# Patient Record
Sex: FEMALE | Race: BLACK OR AFRICAN AMERICAN | NOT HISPANIC OR LATINO | Employment: OTHER | URBAN - METROPOLITAN AREA
[De-identification: names, ages, dates, MRNs, and addresses within clinical notes are randomized per-mention and may not be internally consistent; named-entity substitution may affect disease eponyms.]

---

## 2017-02-24 ENCOUNTER — OFFICE VISIT (OUTPATIENT)
Dept: FAMILY MEDICINE | Facility: CLINIC | Age: 72
End: 2017-02-24
Payer: MEDICARE

## 2017-02-24 VITALS
HEIGHT: 61 IN | BODY MASS INDEX: 31.13 KG/M2 | WEIGHT: 164.88 LBS | TEMPERATURE: 98 F | RESPIRATION RATE: 12 BRPM | OXYGEN SATURATION: 99 % | SYSTOLIC BLOOD PRESSURE: 152 MMHG | DIASTOLIC BLOOD PRESSURE: 96 MMHG | HEART RATE: 65 BPM

## 2017-02-24 DIAGNOSIS — I10 UNCONTROLLED HYPERTENSION: Primary | ICD-10-CM

## 2017-02-24 PROCEDURE — 1157F ADVNC CARE PLAN IN RCRD: CPT | Mod: S$GLB,,, | Performed by: FAMILY MEDICINE

## 2017-02-24 PROCEDURE — 99214 OFFICE O/P EST MOD 30 MIN: CPT | Mod: S$GLB,,, | Performed by: FAMILY MEDICINE

## 2017-02-24 PROCEDURE — 99499 UNLISTED E&M SERVICE: CPT | Mod: S$GLB,,, | Performed by: FAMILY MEDICINE

## 2017-02-24 PROCEDURE — 1160F RVW MEDS BY RX/DR IN RCRD: CPT | Mod: S$GLB,,, | Performed by: FAMILY MEDICINE

## 2017-02-24 PROCEDURE — 3077F SYST BP >= 140 MM HG: CPT | Mod: S$GLB,,, | Performed by: FAMILY MEDICINE

## 2017-02-24 PROCEDURE — 3080F DIAST BP >= 90 MM HG: CPT | Mod: S$GLB,,, | Performed by: FAMILY MEDICINE

## 2017-02-24 PROCEDURE — 99999 PR PBB SHADOW E&M-EST. PATIENT-LVL III: CPT | Mod: PBBFAC,,, | Performed by: FAMILY MEDICINE

## 2017-02-24 PROCEDURE — 1159F MED LIST DOCD IN RCRD: CPT | Mod: S$GLB,,, | Performed by: FAMILY MEDICINE

## 2017-02-24 RX ORDER — METOPROLOL SUCCINATE 50 MG/1
50 TABLET, EXTENDED RELEASE ORAL DAILY
Qty: 90 TABLET | Refills: 0 | Status: SHIPPED | OUTPATIENT
Start: 2017-02-24 | End: 2017-05-05 | Stop reason: SDUPTHER

## 2017-02-24 RX ORDER — METOPROLOL SUCCINATE 100 MG/1
100 TABLET, EXTENDED RELEASE ORAL DAILY
Qty: 90 TABLET | Refills: 0 | Status: SHIPPED | OUTPATIENT
Start: 2017-02-24 | End: 2017-02-24 | Stop reason: SDUPTHER

## 2017-02-24 RX ORDER — AMLODIPINE BESYLATE 10 MG/1
10 TABLET ORAL DAILY
Qty: 90 TABLET | Refills: 0 | Status: SHIPPED | OUTPATIENT
Start: 2017-02-24 | End: 2017-05-05 | Stop reason: SDUPTHER

## 2017-02-24 NOTE — PROGRESS NOTES
Subjective:       Patient ID: Kristin Goodman is a 71 y.o. female.    Chief Complaint: Hypertension    HPI:  Presents with uncontrolled hypertension since 12/16.  BP range 170-180's/.  A lot of stress related to work.  Denies symptoms.  Currently on Metoprolol 50 mg daily.  Took Chronic arthritis pain.  Review of Systems   Eyes: Negative for visual disturbance.   Respiratory: Negative for shortness of breath.    Cardiovascular: Negative for chest pain, palpitations and leg swelling.   Neurological: Positive for headaches (mild HA today). Negative for dizziness.       Objective:      Physical Exam   Constitutional: She appears well-developed and well-nourished.   HENT:   Head: Normocephalic.   Cardiovascular: Normal rate and regular rhythm.    Pulmonary/Chest: Effort normal. No respiratory distress.   Musculoskeletal: She exhibits no edema.         Assessment:       1. Uncontrolled hypertension        Plan:       Uncontrolled hypertension  -     Resume amlodipine (NORVASC) 10 MG tablet; Take 1 tablet (10 mg total) by mouth once daily.  Dispense: 90 tablet; Refill: 0  -     metoprolol succinate (TOPROL-XL) 50 MG 24 hr tablet; Take 1 tablet (50 mg total) by mouth once daily.  Dispense: 90 tablet; Refill: 0      Return in about 2 weeks (around 3/10/2017) for BP.

## 2017-03-02 ENCOUNTER — TELEPHONE (OUTPATIENT)
Dept: FAMILY MEDICINE | Facility: CLINIC | Age: 72
End: 2017-03-02

## 2017-03-02 ENCOUNTER — HOSPITAL ENCOUNTER (EMERGENCY)
Facility: HOSPITAL | Age: 72
Discharge: HOME | End: 2017-03-02
Attending: EMERGENCY MEDICINE
Payer: MEDICARE

## 2017-03-02 VITALS
BODY MASS INDEX: 31.15 KG/M2 | HEART RATE: 78 BPM | SYSTOLIC BLOOD PRESSURE: 168 MMHG | RESPIRATION RATE: 15 BRPM | DIASTOLIC BLOOD PRESSURE: 74 MMHG | WEIGHT: 165 LBS | OXYGEN SATURATION: 100 % | HEIGHT: 61 IN | TEMPERATURE: 98 F

## 2017-03-02 DIAGNOSIS — I10 ESSENTIAL HYPERTENSION: Primary | ICD-10-CM

## 2017-03-02 DIAGNOSIS — R51.9 ACUTE NONINTRACTABLE HEADACHE, UNSPECIFIED HEADACHE TYPE: ICD-10-CM

## 2017-03-02 LAB
ANION GAP SERPL CALC-SCNC: 9 MMOL/L
BASOPHILS # BLD AUTO: 0.02 K/UL
BASOPHILS NFR BLD: 0.2 %
BUN SERPL-MCNC: 13 MG/DL
CALCIUM SERPL-MCNC: 10.3 MG/DL
CHLORIDE SERPL-SCNC: 104 MMOL/L
CO2 SERPL-SCNC: 29 MMOL/L
CREAT SERPL-MCNC: 0.9 MG/DL
DIFFERENTIAL METHOD: ABNORMAL
EOSINOPHIL # BLD AUTO: 0.1 K/UL
EOSINOPHIL NFR BLD: 1.4 %
ERYTHROCYTE [DISTWIDTH] IN BLOOD BY AUTOMATED COUNT: 13.7 %
EST. GFR  (AFRICAN AMERICAN): >60 ML/MIN/1.73 M^2
EST. GFR  (NON AFRICAN AMERICAN): >60 ML/MIN/1.73 M^2
GLUCOSE SERPL-MCNC: 83 MG/DL
HCT VFR BLD AUTO: 41.9 %
HGB BLD-MCNC: 13.9 G/DL
LYMPHOCYTES # BLD AUTO: 2.6 K/UL
LYMPHOCYTES NFR BLD: 31 %
MCH RBC QN AUTO: 28.8 PG
MCHC RBC AUTO-ENTMCNC: 33.2 %
MCV RBC AUTO: 87 FL
MONOCYTES # BLD AUTO: 1.1 K/UL
MONOCYTES NFR BLD: 13.2 %
NEUTROPHILS # BLD AUTO: 4.6 K/UL
NEUTROPHILS NFR BLD: 54.1 %
PLATELET # BLD AUTO: 324 K/UL
PMV BLD AUTO: 10.7 FL
POTASSIUM SERPL-SCNC: 4.1 MMOL/L
RBC # BLD AUTO: 4.82 M/UL
SODIUM SERPL-SCNC: 142 MMOL/L
TROPONIN I SERPL DL<=0.01 NG/ML-MCNC: <0.006 NG/ML
WBC # BLD AUTO: 8.46 K/UL

## 2017-03-02 PROCEDURE — 84484 ASSAY OF TROPONIN QUANT: CPT

## 2017-03-02 PROCEDURE — 63600175 PHARM REV CODE 636 W HCPCS: Performed by: EMERGENCY MEDICINE

## 2017-03-02 PROCEDURE — 85025 COMPLETE CBC W/AUTO DIFF WBC: CPT

## 2017-03-02 PROCEDURE — 25000003 PHARM REV CODE 250: Performed by: EMERGENCY MEDICINE

## 2017-03-02 PROCEDURE — 96361 HYDRATE IV INFUSION ADD-ON: CPT

## 2017-03-02 PROCEDURE — 99284 EMERGENCY DEPT VISIT MOD MDM: CPT | Mod: 25

## 2017-03-02 PROCEDURE — 80048 BASIC METABOLIC PNL TOTAL CA: CPT

## 2017-03-02 PROCEDURE — 93005 ELECTROCARDIOGRAM TRACING: CPT

## 2017-03-02 PROCEDURE — 96374 THER/PROPH/DIAG INJ IV PUSH: CPT

## 2017-03-02 RX ORDER — HYDRALAZINE HYDROCHLORIDE 20 MG/ML
20 INJECTION INTRAMUSCULAR; INTRAVENOUS
Status: COMPLETED | OUTPATIENT
Start: 2017-03-02 | End: 2017-03-02

## 2017-03-02 RX ORDER — SODIUM CHLORIDE 9 MG/ML
500 INJECTION, SOLUTION INTRAVENOUS
Status: COMPLETED | OUTPATIENT
Start: 2017-03-02 | End: 2017-03-02

## 2017-03-02 RX ADMIN — HYDRALAZINE HYDROCHLORIDE 20 MG: 20 INJECTION INTRAMUSCULAR; INTRAVENOUS at 05:03

## 2017-03-02 RX ADMIN — SODIUM CHLORIDE 500 ML: 0.9 INJECTION, SOLUTION INTRAVENOUS at 05:03
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
## 2017-03-02 NOTE — TELEPHONE ENCOUNTER
----- Message from Inocencia Spaulding sent at 3/2/2017  1:10 PM CST -----  Contact: self  Pt blood pressure is high. Please call [phone]. Thanks

## 2017-03-02 NOTE — ED AVS SNAPSHOT
[facility]  [address] [place] [address]               Kristin Jolleyuben   3/2/2017  3:31 PM   ED    Description:  Female : 1945   Department:  [facility]           Your Care was Coordinated By:     Provider Role From To    Ej Simpson III, MD Attending Provider 17 [id number] --      Reason for Visit     Hypertension     Headache           Diagnoses this Visit        Comments    Essential hypertension    -  Primary     Acute nonintractable headache, unspecified headache type           ED Disposition     ED Disposition Condition Comment    Discharge             To Do List           Follow-up Information     Follow up with Ilene Kan MD In 1 week.    Specialty:  Family Medicine    Contact information:    [address]  [place] [id number] [phone]        [facility]     [facility] -  Assistance  Registered nurses in the [facility] provide clinical advisement, health education, appointment booking, and other advisory services.  Call for this free service at [phone].             Medications           Message regarding Medications     Verify the changes and/or additions to your medication regime listed below are the same as discussed with your clinician today.  If any of these changes or additions are incorrect, please notify your healthcare provider.        These medications were administered today        Dose Freq    hydrALAZINE injection 20 mg 20 mg ED 1 Time    Sig: Inject 1 mL (20 mg total) into the vein ED 1 Time.    Class: Normal    Route: Intravenous    0.9%  NaCl infusion 500 mL ED 1 Time    Sig: Inject 500 mLs into the vein ED 1 Time.    Class: Normal    Route: Intravenous           Verify that the below list of medications is an accurate representation of the medications you are currently taking.  If none reported, the list may be blank. If incorrect, please contact your  "healthcare provider. Carry this list with you in case of emergency.           Current Medications     amlodipine (NORVASC) 10 MG tablet Take 1 tablet (10 mg total) by mouth once daily.    aspirin 81 MG chewable tablet Take 81 mg by mouth once daily.      epinastine 0.05 % ophthalmic solution     levothyroxine (SYNTHROID) 25 MCG tablet Take 1 tablet (25 mcg total) by mouth once daily.    metoprolol succinate (TOPROL-XL) 50 MG 24 hr tablet Take 1 tablet (50 mg total) by mouth once daily.    ACETAMINOPHEN (TYLENOL ARTHRITIS PAIN ORAL) Take by mouth.    TRIPHROCAPS 1 mg Cap TAKE ONE CAPSULE BY MOUTH ONCE DAILY           Clinical Reference Information           Your Vitals Were     BP Pulse Temp Resp Height Weight    178/75 80 98.2 °F (36.8 °C) (Oral) 15 5' 1" (1.549 m) 74.8 kg (165 lb)    SpO2 BMI             100% 31.18 kg/m2         Allergies as of 3/2/2017        Reactions    Penicillins     Other reaction(s): Hives    Sulfa (Sulfonamide Antibiotics) Rash      Immunizations Administered on Date of Encounter - 3/2/2017     None      ED Micro, Lab, POCT     Start Ordered       Status Ordering Provider    03/02/17 1631 03/02/17 1630  Troponin I  STAT      Final result     03/02/17 1551 03/02/17 1550  CBC auto differential  STAT      Final result     03/02/17 1551 03/02/17 1550  Basic metabolic panel  STAT      Final result       ED Imaging Orders     Start Ordered       Status Ordering Provider    03/02/17 1551 03/02/17 1550  CT Head Without Contrast  1 time imaging      Final result         Discharge Instructions       Return to the emergency department if you develop worsening headache, sudden changes in your vision, fainting, chest pain, difficulty breathing, or for any other medical concerns.         Controlling High Blood Pressure  High blood pressure (hypertension) is often called the silent killer. This is because many people who have it dont know it. High blood pressure is defined as 140/90 mm Hg or higher. Know " your blood pressure and remember to check it regularly. Doing so can save your life. Here are some things you can do to help control your blood pressure.    Choose heart-healthy foods  · Select low-salt, low-fat foods. Limit sodium intake to 2,400 mg per day or the amount suggested by your healthcare provider.  · Limit canned, dried, cured, packaged, and fast foods. These can contain a lot of salt.  · Eat 8 to 10 servings of fruits and vegetables every day.  · Choose lean meats, fish, or chicken.  · Eat whole-grain pasta, brown rice, and beans.  · Eat 2 to 3 servings of low-fat or fat-free dairy products.  · Ask your doctor about the DASH eating plan. This plan helps reduce blood pressure.  · When you go to a restaurant, ask that your meal be prepared with no added salt.  Maintain a healthy weight  · Ask your healthcare provider how many calories to eat a day. Then stick to that number.  · Ask your healthcare provider what weight range is healthiest for you. If you are overweight, a weight loss of only 3% to 5% of your body weight can help lower blood pressure. Generally, a good weight loss goal is to lose 10% of your body weight in a year.  · Limit snacks and sweets.  · Get regular exercise.  Get up and get active  · Choose activities you enjoy. Find ones you can do with friends or family. This includes bicycling, dancing, walking, and jogging.  · Park farther away from building entrances.  · Use stairs instead of the elevator.  · When you can, walk or bike instead of driving.  · Kimberly leaves, garden, or do household repairs.  · Be active at a moderate to vigorous level of physical activity for at least 40 minutes for a minimum of 3 to 4 days a week.   Manage stress  · Make time to relax and enjoy life. Find time to laugh.  · Communicate your concerns with your loved ones and your healthcare provider.  · Visit with family and friends, and keep up with hobbies.  Limit alcohol and quit smoking  · Men should have no  more than 2 drinks per day.  · Women should have no more than 1 drink per day.  · Talk with your healthcare provider about quitting smoking. Smoking significantly increases your risk for heart disease and stroke. Ask your healthcare provider about community smoking cessation programs and other options.  Medicines  If lifestyle changes arent enough, your healthcare provider may prescribe high blood pressure medicine. Take all medicines as prescribed. If you have any questions about your medicines, ask your healthcare provider before stopping or changing them.   Date Last Reviewed: 4/27/2016 © 2000-2016 [employer]. [address] [place] [address]. All rights reserved. This information is not intended as a substitute for professional medical care. Always follow your healthcare professional's instructions.          Your Scheduled Appointments     Mar 10, 2017  7:20 AM CST   Established Patient Visit with Ilene Kan MD   [facility] ([place]) [address] [place] [address] [phone]            Jun 06, 2017  2:20 PM CDT   Established Patient Visit with Adam Merchant MD   [facility] ([facility] )    [address]  [place] [address] [phone]              Smoking Cessation     If you would like to quit smoking:   You may be eligible for free services if you are a [place] resident and started smoking cigarettes before September 1, 1988.  Call the Smoking Cessation Trust (SCT) toll free at (300) 295-6827 or (606) 199-6493.   Call 1-925-QUIT-NOW if you do not meet the above criteria.             [facility] complies with applicable Federal civil rights laws and does not discriminate on the basis of race, color, national origin, age, disability, or sex.        Language Assistance Services     ATTENTION: Language assistance services are available, free of charge. Please call [phone].       ATENCIÓN: Si habla español, tiene a dorsey disposición servicios gratuitos de asistencia lingüística. Llame al [phone].     CHÚ Ý: N?u b?n nói Ti?ng Vi?t, có các d?ch v? h? tr? ngôn ng? mi?n phí dành cho b?n. G?i s? [phone].

## 2017-03-03 NOTE — DISCHARGE INSTRUCTIONS
Return to the emergency department if you develop worsening headache, sudden changes in your vision, fainting, chest pain, difficulty breathing, or for any other medical concerns.         Controlling High Blood Pressure  High blood pressure (hypertension) is often called the silent killer. This is because many people who have it dont know it. High blood pressure is defined as 140/90 mm Hg or higher. Know your blood pressure and remember to check it regularly. Doing so can save your life. Here are some things you can do to help control your blood pressure.    Choose heart-healthy foods  · Select low-salt, low-fat foods. Limit sodium intake to 2,400 mg per day or the amount suggested by your healthcare provider.  · Limit canned, dried, cured, packaged, and fast foods. These can contain a lot of salt.  · Eat 8 to 10 servings of fruits and vegetables every day.  · Choose lean meats, fish, or chicken.  · Eat whole-grain pasta, brown rice, and beans.  · Eat 2 to 3 servings of low-fat or fat-free dairy products.  · Ask your doctor about the DASH eating plan. This plan helps reduce blood pressure.  · When you go to a restaurant, ask that your meal be prepared with no added salt.  Maintain a healthy weight  · Ask your healthcare provider how many calories to eat a day. Then stick to that number.  · Ask your healthcare provider what weight range is healthiest for you. If you are overweight, a weight loss of only 3% to 5% of your body weight can help lower blood pressure. Generally, a good weight loss goal is to lose 10% of your body weight in a year.  · Limit snacks and sweets.  · Get regular exercise.  Get up and get active  · Choose activities you enjoy. Find ones you can do with friends or family. This includes bicycling, dancing, walking, and jogging.  · Park farther away from building entrances.  · Use stairs instead of the elevator.  · When you can, walk or bike instead of driving.  · [place] leaves, garden, or do  household repairs.  · Be active at a moderate to vigorous level of physical activity for at least 40 minutes for a minimum of 3 to 4 days a week.   Manage stress  · Make time to relax and enjoy life. Find time to laugh.  · Communicate your concerns with your loved ones and your healthcare provider.  · Visit with family and friends, and keep up with hobbies.  Limit alcohol and quit smoking  · Men should have no more than 2 drinks per day.  · Women should have no more than 1 drink per day.  · Talk with your healthcare provider about quitting smoking. Smoking significantly increases your risk for heart disease and stroke. Ask your healthcare provider about community smoking cessation programs and other options.  Medicines  If lifestyle changes arent enough, your healthcare provider may prescribe high blood pressure medicine. Take all medicines as prescribed. If you have any questions about your medicines, ask your healthcare provider before stopping or changing them.   Date Last Reviewed: 4/27/2016  © 5863-0392 [employer]. [address] [place] [address], [place] [address]. All rights reserved. This information is not intended as a substitute for professional medical care. Always follow your healthcare professional's instructions.

## 2017-03-03 NOTE — ED TRIAGE NOTES
Pt. With PMH of HTN presents with elevated blood pressure and hypertension. Pt. Reports that she was seen by her PCP and placed on a new blood pressure medication patient cannot recall name of medication. Pt. Reports intermittent dizziness with standing and an episode of sharp chest pain that occurred lastnight. Denies any chest pain or dizziness at this time. Placed on cardiac monitor, pulse ox, and nibp.

## 2017-03-10 ENCOUNTER — OFFICE VISIT (OUTPATIENT)
Dept: FAMILY MEDICINE | Facility: CLINIC | Age: 72
End: 2017-03-10
Payer: MEDICARE

## 2017-03-10 VITALS
OXYGEN SATURATION: 97 % | DIASTOLIC BLOOD PRESSURE: 90 MMHG | SYSTOLIC BLOOD PRESSURE: 144 MMHG | RESPIRATION RATE: 16 BRPM | WEIGHT: 165.56 LBS | HEIGHT: 61 IN | HEART RATE: 65 BPM | TEMPERATURE: 98 F | BODY MASS INDEX: 31.26 KG/M2

## 2017-03-10 DIAGNOSIS — I10 UNCONTROLLED HYPERTENSION: Primary | ICD-10-CM

## 2017-03-10 PROCEDURE — 1157F ADVNC CARE PLAN IN RCRD: CPT | Mod: S$GLB,,, | Performed by: FAMILY MEDICINE

## 2017-03-10 PROCEDURE — 1159F MED LIST DOCD IN RCRD: CPT | Mod: S$GLB,,, | Performed by: FAMILY MEDICINE

## 2017-03-10 PROCEDURE — 99499 UNLISTED E&M SERVICE: CPT | Mod: S$GLB,,, | Performed by: FAMILY MEDICINE

## 2017-03-10 PROCEDURE — 99999 PR PBB SHADOW E&M-EST. PATIENT-LVL III: CPT | Mod: PBBFAC,,, | Performed by: FAMILY MEDICINE

## 2017-03-10 PROCEDURE — 99214 OFFICE O/P EST MOD 30 MIN: CPT | Mod: S$GLB,,, | Performed by: FAMILY MEDICINE

## 2017-03-10 PROCEDURE — 1126F AMNT PAIN NOTED NONE PRSNT: CPT | Mod: S$GLB,,, | Performed by: FAMILY MEDICINE

## 2017-03-10 PROCEDURE — 1160F RVW MEDS BY RX/DR IN RCRD: CPT | Mod: S$GLB,,, | Performed by: FAMILY MEDICINE

## 2017-03-10 PROCEDURE — 3080F DIAST BP >= 90 MM HG: CPT | Mod: S$GLB,,, | Performed by: FAMILY MEDICINE

## 2017-03-10 PROCEDURE — 3077F SYST BP >= 140 MM HG: CPT | Mod: S$GLB,,, | Performed by: FAMILY MEDICINE

## 2017-03-10 RX ORDER — SPIRONOLACTONE 25 MG/1
25 TABLET ORAL DAILY
Qty: 30 TABLET | Refills: 2 | Status: SHIPPED | OUTPATIENT
Start: 2017-03-10 | End: 2017-05-05 | Stop reason: SDUPTHER

## 2017-03-10 NOTE — PROGRESS NOTES
Subjective:       Patient ID: Kristin Goodman is a 71 y.o. female.    Chief Complaint: Hospital Follow Up (patient went to [facility]- for elevated BP on 3/2/17. )    HPI:  Here for follow up ER visit for severely elevated BP.  Reports symptoms of severe HA, lightheadedness and dizziness.  BP upon presentation 240/116.  Patient just ecently resumed amlodipine.  CT head wnl.  BP has improved since discharge.  BP range 135-161/63-98.  Review of Systems   Eyes: Negative for visual disturbance.   Cardiovascular: Negative for chest pain.   Neurological: Negative for dizziness and headaches.       Objective:      Physical Exam   Constitutional: She is oriented to person, place, and time. She appears well-developed and well-nourished.   Eyes: No scleral icterus.   Neck: Normal range of motion. Neck supple. No thyromegaly present.   Cardiovascular: Normal rate and regular rhythm.  Exam reveals no gallop and no friction rub.    No murmur heard.  Pulses:       Dorsalis pedis pulses are 2+ on the right side, and 2+ on the left side.   Pulmonary/Chest: Effort normal and breath sounds normal. She has no wheezes. She has no rales.   Abdominal: Soft. Bowel sounds are normal. She exhibits no distension and no mass. There is no hepatosplenomegaly. There is no tenderness.   Musculoskeletal: She exhibits no edema.        Right foot: There is no deformity.        Left foot: There is no deformity.   Feet:   Right Foot:   Skin Integrity: Negative for ulcer.   Left Foot:   Skin Integrity: Negative for ulcer.   Lymphadenopathy:     She has no cervical adenopathy.     She has no axillary adenopathy.        Right: No supraclavicular adenopathy present.        Left: No supraclavicular adenopathy present.   Neurological: She is alert and oriented to person, place, and time.   Skin: Skin is warm and dry. No rash noted.   Psychiatric: She has a normal mood and affect. Her behavior is normal.        CT Head:  Age-appropriate generalized cerebral volume  loss with small hypodensity within the left thalamus most compatible with remote lacunar type infarct.  Assessment:       1. Uncontrolled hypertension        Plan:       Uncontrolled hypertension  -     Suspect variations in BP due to stress at work.  Will add spironolactone (ALDACTONE) 25 MG tablet; Take 1 tablet (25 mg total) by mouth once daily.  Dispense: 30 tablet; Refill: 2.  Continue amlodipine and metoprolol as prescribed.      Return in about 4 weeks (around 4/7/2017) for BP.

## 2017-03-29 DIAGNOSIS — E03.9 HYPOTHYROIDISM, UNSPECIFIED TYPE: Primary | ICD-10-CM

## 2017-03-29 NOTE — TELEPHONE ENCOUNTER
----- Message from Inocencia Spaulding sent at 3/29/2017  8:01 AM CDT -----  Contact: self  Pt needs levothyroxine (SYNTHROID) 25 MCG tablet refilled and sent to [facility]. Please call [phone]. thanks

## 2017-03-30 RX ORDER — LEVOTHYROXINE SODIUM 25 UG/1
25 TABLET ORAL DAILY
Qty: 90 TABLET | Refills: 3 | Status: SHIPPED | OUTPATIENT
Start: 2017-03-30 | End: 2017-09-15 | Stop reason: SDUPTHER

## 2017-05-05 ENCOUNTER — OFFICE VISIT (OUTPATIENT)
Dept: FAMILY MEDICINE | Facility: CLINIC | Age: 72
End: 2017-05-05
Payer: MEDICARE

## 2017-05-05 ENCOUNTER — LAB VISIT (OUTPATIENT)
Dept: LAB | Facility: HOSPITAL | Age: 72
End: 2017-05-05
Attending: FAMILY MEDICINE
Payer: MEDICARE

## 2017-05-05 VITALS
RESPIRATION RATE: 14 BRPM | BODY MASS INDEX: 31.05 KG/M2 | WEIGHT: 164.44 LBS | SYSTOLIC BLOOD PRESSURE: 142 MMHG | TEMPERATURE: 98 F | HEART RATE: 76 BPM | DIASTOLIC BLOOD PRESSURE: 70 MMHG | OXYGEN SATURATION: 96 % | HEIGHT: 61 IN

## 2017-05-05 DIAGNOSIS — I10 UNCONTROLLED HYPERTENSION: ICD-10-CM

## 2017-05-05 DIAGNOSIS — I10 UNCONTROLLED HYPERTENSION: Primary | ICD-10-CM

## 2017-05-05 LAB
CHOLEST/HDLC SERPL: 4.2 {RATIO}
HDL/CHOLESTEROL RATIO: 23.7 %
HDLC SERPL-MCNC: 152 MG/DL
HDLC SERPL-MCNC: 36 MG/DL
LDLC SERPL CALC-MCNC: 92.4 MG/DL
NONHDLC SERPL-MCNC: 116 MG/DL
TRIGL SERPL-MCNC: 118 MG/DL

## 2017-05-05 PROCEDURE — 99999 PR PBB SHADOW E&M-EST. PATIENT-LVL III: CPT | Mod: PBBFAC,,, | Performed by: FAMILY MEDICINE

## 2017-05-05 PROCEDURE — 99499 UNLISTED E&M SERVICE: CPT | Mod: S$GLB,,, | Performed by: FAMILY MEDICINE

## 2017-05-05 PROCEDURE — 1126F AMNT PAIN NOTED NONE PRSNT: CPT | Mod: S$GLB,,, | Performed by: FAMILY MEDICINE

## 2017-05-05 PROCEDURE — 3078F DIAST BP <80 MM HG: CPT | Mod: S$GLB,,, | Performed by: FAMILY MEDICINE

## 2017-05-05 PROCEDURE — 3077F SYST BP >= 140 MM HG: CPT | Mod: S$GLB,,, | Performed by: FAMILY MEDICINE

## 2017-05-05 PROCEDURE — 80061 LIPID PANEL: CPT

## 2017-05-05 PROCEDURE — 1157F ADVNC CARE PLAN IN RCRD: CPT | Mod: S$GLB,,, | Performed by: FAMILY MEDICINE

## 2017-05-05 PROCEDURE — 1160F RVW MEDS BY RX/DR IN RCRD: CPT | Mod: S$GLB,,, | Performed by: FAMILY MEDICINE

## 2017-05-05 PROCEDURE — 99213 OFFICE O/P EST LOW 20 MIN: CPT | Mod: S$GLB,,, | Performed by: FAMILY MEDICINE

## 2017-05-05 PROCEDURE — 36415 COLL VENOUS BLD VENIPUNCTURE: CPT | Mod: PO

## 2017-05-05 PROCEDURE — 1159F MED LIST DOCD IN RCRD: CPT | Mod: S$GLB,,, | Performed by: FAMILY MEDICINE

## 2017-05-05 RX ORDER — METOPROLOL SUCCINATE 50 MG/1
TABLET, EXTENDED RELEASE ORAL
Qty: 135 TABLET | Refills: 0 | Status: SHIPPED | OUTPATIENT
Start: 2017-05-05 | End: 2017-09-15 | Stop reason: SDUPTHER

## 2017-05-05 RX ORDER — AMLODIPINE BESYLATE 10 MG/1
10 TABLET ORAL DAILY
Qty: 90 TABLET | Refills: 0 | Status: SHIPPED | OUTPATIENT
Start: 2017-05-05 | End: 2017-08-16 | Stop reason: SDUPTHER

## 2017-05-05 RX ORDER — SPIRONOLACTONE 25 MG/1
25 TABLET ORAL DAILY
Qty: 90 TABLET | Refills: 0 | Status: SHIPPED | OUTPATIENT
Start: 2017-05-05 | End: 2017-09-15 | Stop reason: SDUPTHER

## 2017-05-05 NOTE — PROGRESS NOTES
Subjective:       Patient ID: Kristin Goodman is a 72 y.o. female.    Chief Complaint: Hypertension    Hypertension   This is a chronic problem. The current episode started more than 1 year ago. The problem has been gradually improving since onset. Pertinent negatives include no chest pain or peripheral edema. Past treatments include calcium channel blockers, diuretics and beta blockers. The current treatment provides significant improvement. There are no compliance problems.    tolerating spironolactone.  /72 at home this am.  Review of Systems   Cardiovascular: Negative for chest pain.       Objective:      Physical Exam   Constitutional: She appears well-developed.   Musculoskeletal: She exhibits no edema.   Neurological: She is alert.         Assessment:       1. Uncontrolled hypertension        Plan:       Uncontrolled hypertension  BP borderline.   -     Lipid panel; Future; Expected date: 5/5/17  -     amlodipine (NORVASC) 10 MG tablet; Take 1 tablet (10 mg total) by mouth once daily.  Dispense: 90 tablet; Refill: 0  -     Will increase metoprolol if BP not at goal next week.  metoprolol succinate (TOPROL-XL) 50 MG 24 hr tablet; Take 1 and 1/2 tablet daily  Dispense: 135 tablet; Refill: 0  -     spironolactone (ALDACTONE) 25 MG tablet; Take 1 tablet (25 mg total) by mouth once daily.  Dispense: 90 tablet; Refill: 0  -     vitamin renal formula, B-complex-vitamin c-folic acid, (TRIPHROCAPS) 1 mg Cap; Take 1 capsule by mouth once daily.  Dispense: 90 capsule; Refill: 1        Return in about 1 week (around 5/12/2017) for nurse BP check.

## 2017-06-06 ENCOUNTER — OFFICE VISIT (OUTPATIENT)
Dept: RHEUMATOLOGY | Facility: CLINIC | Age: 72
End: 2017-06-06
Payer: MEDICARE

## 2017-06-06 VITALS
BODY MASS INDEX: 31.32 KG/M2 | HEIGHT: 61 IN | HEART RATE: 65 BPM | WEIGHT: 165.88 LBS | SYSTOLIC BLOOD PRESSURE: 149 MMHG | DIASTOLIC BLOOD PRESSURE: 77 MMHG

## 2017-06-06 DIAGNOSIS — M54.50 ACUTE MIDLINE LOW BACK PAIN WITHOUT SCIATICA: ICD-10-CM

## 2017-06-06 DIAGNOSIS — M15.9 PRIMARY OSTEOARTHRITIS INVOLVING MULTIPLE JOINTS: Primary | ICD-10-CM

## 2017-06-06 PROCEDURE — 1159F MED LIST DOCD IN RCRD: CPT | Mod: S$GLB,,, | Performed by: INTERNAL MEDICINE

## 2017-06-06 PROCEDURE — 99213 OFFICE O/P EST LOW 20 MIN: CPT | Mod: S$GLB,,, | Performed by: INTERNAL MEDICINE

## 2017-06-06 PROCEDURE — 1125F AMNT PAIN NOTED PAIN PRSNT: CPT | Mod: S$GLB,,, | Performed by: INTERNAL MEDICINE

## 2017-06-06 PROCEDURE — 99999 PR PBB SHADOW E&M-EST. PATIENT-LVL III: CPT | Mod: PBBFAC,,, | Performed by: INTERNAL MEDICINE

## 2017-06-06 ASSESSMENT — ROUTINE ASSESSMENT OF PATIENT INDEX DATA (RAPID3)
PSYCHOLOGICAL DISTRESS SCORE: 3.3
AM STIFFNESS SCORE: 1, YES
PAIN SCORE: 6.5
PATIENT GLOBAL ASSESSMENT SCORE: 5.5
MDHAQ FUNCTION SCORE: .4
TOTAL RAPID3 SCORE: 4.44
FATIGUE SCORE: 5

## 2017-06-08 NOTE — PROGRESS NOTES
History of present illness: 72-year-old female I saw initially one year ago.  She had back and leg pain due to osteoarthritis, trochanteric bursitis, and sciatica.  She had had an elevated CPK which decreased but never returned completely to normal.  At the time of her last visit I just continued her on Tylenol.  She comes back for routine follow-up.    She complains of increased pain in the lower back for the past 3 days.  She had been doing well prior to that.  She had been on her feet more.  The pain bothers her with activity.  It does not wake her up at night.  Tylenol has been helping.  Topical medications give her some relief.  She still continues to complain of some pain in the right hip.  She has had no joint swelling.  She has had no numbness or weakness in her legs.  She has had no other recent medical problems.    Physical examination:  Musculoskeletal: She has tenderness to palpation of the lower lumbar spine and the greater trochanters.  She has decreased range of motion of the lumbar spine.  Straight leg raising is negative.  Knees, ankles, small joints of feet are unremarkable.    Assessment: She is having a flare of her osteoarthritis due to increased activity    Plans:  1.  Use heat or ice to the area  2.  Continue Tylenol as before.  I am placing her on no new medications at this time.  3.  Return to see me in 12 months

## 2017-08-16 DIAGNOSIS — I10 UNCONTROLLED HYPERTENSION: ICD-10-CM

## 2017-08-16 RX ORDER — AMLODIPINE BESYLATE 10 MG/1
TABLET ORAL
Qty: 90 TABLET | Refills: 0 | Status: SHIPPED | OUTPATIENT
Start: 2017-08-16 | End: 2017-09-15 | Stop reason: SDUPTHER

## 2017-08-16 NOTE — LETTER
August 17, 2017    Kristin Goodman  [address]  [place] [address]             [facility]  [address] [place] [address]  Phone: [phone]  Fax: [phone] Dear Fadia Goodman:    Greetings, we have been trying to reach you. Our records indicate that you are due for an office visit for your high blood pressure. Please call [phone] to schedule your follow up. [facility].      If you have any questions or concerns, please don't hesitate to call.    Sincerely,        Brianna Tripathi LPN

## 2017-09-15 ENCOUNTER — OFFICE VISIT (OUTPATIENT)
Dept: FAMILY MEDICINE | Facility: CLINIC | Age: 72
End: 2017-09-15
Payer: MEDICARE

## 2017-09-15 VITALS
SYSTOLIC BLOOD PRESSURE: 146 MMHG | TEMPERATURE: 99 F | HEART RATE: 78 BPM | OXYGEN SATURATION: 98 % | HEIGHT: 61 IN | WEIGHT: 166 LBS | DIASTOLIC BLOOD PRESSURE: 80 MMHG | BODY MASS INDEX: 31.34 KG/M2 | RESPIRATION RATE: 16 BRPM

## 2017-09-15 DIAGNOSIS — I10 UNCONTROLLED HYPERTENSION: ICD-10-CM

## 2017-09-15 DIAGNOSIS — M89.9 BONE DISORDER: Primary | ICD-10-CM

## 2017-09-15 DIAGNOSIS — E03.9 HYPOTHYROIDISM, UNSPECIFIED TYPE: ICD-10-CM

## 2017-09-15 PROCEDURE — 99214 OFFICE O/P EST MOD 30 MIN: CPT | Mod: S$GLB,,, | Performed by: FAMILY MEDICINE

## 2017-09-15 PROCEDURE — 3008F BODY MASS INDEX DOCD: CPT | Mod: S$GLB,,, | Performed by: FAMILY MEDICINE

## 2017-09-15 PROCEDURE — 1125F AMNT PAIN NOTED PAIN PRSNT: CPT | Mod: S$GLB,,, | Performed by: FAMILY MEDICINE

## 2017-09-15 PROCEDURE — 99499 UNLISTED E&M SERVICE: CPT | Mod: S$GLB,,, | Performed by: FAMILY MEDICINE

## 2017-09-15 PROCEDURE — 99999 PR PBB SHADOW E&M-EST. PATIENT-LVL III: CPT | Mod: PBBFAC,,, | Performed by: FAMILY MEDICINE

## 2017-09-15 PROCEDURE — 3079F DIAST BP 80-89 MM HG: CPT | Mod: S$GLB,,, | Performed by: FAMILY MEDICINE

## 2017-09-15 PROCEDURE — 3077F SYST BP >= 140 MM HG: CPT | Mod: S$GLB,,, | Performed by: FAMILY MEDICINE

## 2017-09-15 PROCEDURE — 1159F MED LIST DOCD IN RCRD: CPT | Mod: S$GLB,,, | Performed by: FAMILY MEDICINE

## 2017-09-15 RX ORDER — SPIRONOLACTONE 25 MG/1
25 TABLET ORAL DAILY
Qty: 90 TABLET | Refills: 0 | Status: SHIPPED | OUTPATIENT
Start: 2017-09-15 | End: 2017-09-15

## 2017-09-15 RX ORDER — LEVOTHYROXINE SODIUM 25 UG/1
25 TABLET ORAL DAILY
Qty: 90 TABLET | Refills: 3 | Status: SHIPPED | OUTPATIENT
Start: 2017-09-15 | End: 2018-02-15 | Stop reason: SDUPTHER

## 2017-09-15 RX ORDER — METOPROLOL SUCCINATE 50 MG/1
TABLET, EXTENDED RELEASE ORAL
Qty: 135 TABLET | Refills: 1 | Status: SHIPPED | OUTPATIENT
Start: 2017-09-15 | End: 2018-06-19 | Stop reason: SDUPTHER

## 2017-09-15 RX ORDER — AMLODIPINE BESYLATE 10 MG/1
10 TABLET ORAL DAILY
Qty: 90 TABLET | Refills: 1 | Status: SHIPPED | OUTPATIENT
Start: 2017-09-15 | End: 2018-02-15 | Stop reason: SDUPTHER
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
## 2017-10-20 ENCOUNTER — LAB VISIT (OUTPATIENT)
Dept: LAB | Facility: HOSPITAL | Age: 72
End: 2017-10-20
Attending: FAMILY MEDICINE
Payer: MEDICARE

## 2017-10-20 ENCOUNTER — OFFICE VISIT (OUTPATIENT)
Dept: FAMILY MEDICINE | Facility: CLINIC | Age: 72
End: 2017-10-20
Payer: MEDICARE

## 2017-10-20 VITALS
SYSTOLIC BLOOD PRESSURE: 156 MMHG | WEIGHT: 163.81 LBS | TEMPERATURE: 98 F | DIASTOLIC BLOOD PRESSURE: 82 MMHG | BODY MASS INDEX: 30.93 KG/M2 | HEART RATE: 65 BPM | RESPIRATION RATE: 16 BRPM | HEIGHT: 61 IN | OXYGEN SATURATION: 98 %

## 2017-10-20 DIAGNOSIS — R73.03 PRE-DIABETES: ICD-10-CM

## 2017-10-20 DIAGNOSIS — Z00.00 ENCOUNTER FOR PREVENTIVE HEALTH EXAMINATION: Primary | ICD-10-CM

## 2017-10-20 DIAGNOSIS — Z12.31 ENCOUNTER FOR SCREENING MAMMOGRAM FOR BREAST CANCER: ICD-10-CM

## 2017-10-20 DIAGNOSIS — Z78.0 POSTMENOPAUSAL STATE: ICD-10-CM

## 2017-10-20 DIAGNOSIS — I10 ESSENTIAL HYPERTENSION: ICD-10-CM

## 2017-10-20 DIAGNOSIS — E03.9 ACQUIRED HYPOTHYROIDISM: ICD-10-CM

## 2017-10-20 PROCEDURE — 36415 COLL VENOUS BLD VENIPUNCTURE: CPT | Mod: PO

## 2017-10-20 PROCEDURE — G0439 PPPS, SUBSEQ VISIT: HCPCS | Mod: S$GLB,,, | Performed by: PHYSICIAN ASSISTANT

## 2017-10-20 PROCEDURE — 99999 PR PBB SHADOW E&M-EST. PATIENT-LVL V: CPT | Mod: PBBFAC,,, | Performed by: PHYSICIAN ASSISTANT

## 2017-10-20 PROCEDURE — 83036 HEMOGLOBIN GLYCOSYLATED A1C: CPT

## 2017-10-20 PROCEDURE — 99499 UNLISTED E&M SERVICE: CPT | Mod: S$GLB,,, | Performed by: PHYSICIAN ASSISTANT

## 2017-10-20 NOTE — PROGRESS NOTES
"Kristin Goodman presented for a  Medicare AWV and comprehensive Health Risk Assessment today. The following components were reviewed and updated:    · Medical history  · Family History  · Social history  · Allergies and Current Medications  · Health Risk Assessment  · Health Maintenance  · Care Team     ** See Completed Assessments for Annual Wellness Visit within the encounter summary.**       The following assessments were completed:  · Living Situation  · CAGE  · Depression Screening  · Timed Get Up and Go  · Whisper Test  · Cognitive Function Screening  · Nutrition Screening  · ADL Screening  · PAQ Screening    Vitals:    10/20/17 0945   BP: (!) 156/82   Pulse: 65   Resp: 16   Temp: 98.1 °F (36.7 °C)   TempSrc: Oral   SpO2: 98%   Weight: 74.3 kg (163 lb 12.8 oz)   Height: 5' 1" (1.549 m)     Body mass index is 30.95 kg/m².  Physical Exam   Constitutional: She appears well-developed and well-nourished. No distress.   HENT:   Head: Normocephalic and atraumatic.   Cardiovascular: Normal rate and regular rhythm.    Pulmonary/Chest: Effort normal and breath sounds normal.   Skin: Skin is warm and dry. She is not diaphoretic.   Psychiatric: She has a normal mood and affect. Her behavior is normal.   Vitals reviewed.        Diagnoses and health risks identified today and associated recommendations/orders:    1. Encounter for preventive health examination  Provided Kristin with a 5-10 year written screening schedule and personal prevention plan. Recommendations were developed using the USPSTF age appropriate recommendations. Education, counseling, and referrals were provided as needed. After Visit Summary printed and given to patient which includes a list of additional screenings\tests needed.    2. Pre-diabetes  - Hemoglobin A1c; Future    3. Postmenopausal state  - DXA Bone Density Spine And Hip; Future    4. Encounter for screening mammogram for breast cancer  - Mammo Digital Screening Bilat with CAD; Future    5. " Essential hypertension  Uncontrolled today, pt just took meds, will f/u after stress test     6. Acquired hypothyroidism  - TSH; Future      No Follow-up on file.    Arlene Billings PA-C

## 2017-10-20 NOTE — PATIENT INSTRUCTIONS
Counseling and Referral of Other Preventative  (Italic type indicates deductible and co-insurance are waived)    Patient Name: Kristin Goodman  Today's Date: 10/20/2017      SERVICE LIMITATIONS RECOMMENDATION    Vaccines    · Pneumococcal (once after 65)    · Influenza (annually)    · Hepatitis B (if medium/high risk)    · Prevnar 13      Hepatitis B medium/high risk factors:       - End-stage renal disease       - Hemophiliacs who received Factor VII or         IX concentrates       - Clients of institutions for the mentally             retarded       - Persons who live in the same house as          a HepB carrier       - Homosexual men       - Illicit injectable drug abusers     Pneumococcal: Done, no repeat necessary     Influenza: Recommended to patient, declined     Hepatitis B: N/A     Prevnar 13: Done, no repeat necessary    Mammogram (biennial age 50-74)  Annually (age 40 or over)  Scheduled, see appointments    Pap (up to age 70 and after 70 if unknown history or abnormal study last 10 years)    N/A     The USPSTF recommends against screening for cervical cancer in women who have had a hysterectomy with removal of the cervix and who do not have a history of a high-grade precancerous lesion (cervical intraepithelial neoplasia [KRISTA] grade 2 or 3) or cervical cancer.     Colorectal cancer screening (to age 75)    · Fecal occult blood test (annual)  · Flexible sigmoidoscopy (5y)  · Screening colonoscopy (10y)  · Barium enema   Last done 2012, recommend to repeat every 7  years    Diabetes self-management training (no USPSTF recommendations)  Requires referral by treating physician for patient with diabetes or renal disease. 10 hours of initial DSMT sessions of no less than 30 minutes each in a continuous 12-month period. 2 hours of follow-up DSMT in subsequent years.  N/A    Bone mass measurements (age 65 & older, biennial)  Requires diagnosis related to osteoporosis or estrogen deficiency. Biennial benefit  unless patient has history of long-term glucocorticoid  Scheduled, see appointments    Glaucoma screening (no USPSTF recommendation)  Diabetes mellitus, family history   , age 50 or over    American, age 65 or over  Done this year, repeat every year    Medical nutrition therapy for diabetes or renal disease (no recommended schedule)  Requires referral by treating physician for patient with diabetes or renal disease or kidney transplant within the past 3 years.  Can be provided in same year as diabetes self-management training (DSMT), and CMS recommends medical nutrition therapy take place after DSMT. Up to 3 hours for initial year and 2 hours in subsequent years.  N/A    Cardiovascular screening blood tests (every 5 years)  · Fasting lipid panel  Order as a panel if possible  Done this year, repeat every year    Diabetes screening tests (at least every 3 years, Medicare covers annually or at 6-month intervals for prediabetic patients)  · Fasting blood sugar (FBS) or glucose tolerance test (GTT)  Patient must be diagnosed with one of the following:       - Hypertension       - Dyslipidemia       - Obesity (BMI 30kg/m2)       - Previous elevated impaired FBS or GTT       ... or any two of the following:       - Overweight (BMI 25 but <30)       - Family history of diabetes       - Age 65 or older       - History of gestational diabetes or birth of baby weighing more than 9 pounds  Scheduled, see appointments    HIV screening (annually for increased risk patients)  · HIV-1 and HIV-2 by EIA, or NASEEM, rapid antibody test or oral mucosa transudate  Patients must be at increased risk for HIV infection per USPSTF guidelines or pregnant. Tests covered annually for patient at increased risk or as requested by the patient. Pregnant patients may receive up to 3 tests during pregnancy.  Risks discussed, screening is not recommended    Smoking cessation counseling (up to 8 sessions per year)  Patients  must be asymptomatic of tobacco-related conditions to receive as a preventative service.  Non-smoker    Subsequent annual wellness visit  At least 12 months since last AWV  Return in one year     The following information is provided to all patients.  This information is to help you find resources for any of the problems found today that may be affecting your health:                Living healthy guide: www.[facility].[place].[facility]      Understanding Diabetes: www.diabetes.org      Eating healthy: www.cdc.gov/healthyweight      CDC home safety checklist: www.cdc.gov/steadi/patient.html      Agency on Aging: www.goea.[place].[facility]      Alcoholics anonymous (AA): www.aa.org      Physical Activity: www.kulwant.nih.gov/mi9qkwq      Tobacco use: www.quitwithusla.org

## 2017-10-21 LAB
ESTIMATED AVG GLUCOSE: 114 MG/DL
HBA1C MFR BLD HPLC: 5.6 %

## 2017-10-27 ENCOUNTER — HOSPITAL ENCOUNTER (OUTPATIENT)
Dept: CARDIOLOGY | Facility: HOSPITAL | Age: 72
Discharge: HOME | End: 2017-10-27
Attending: FAMILY MEDICINE
Payer: MEDICARE

## 2017-10-27 ENCOUNTER — HOSPITAL ENCOUNTER (OUTPATIENT)
Dept: RADIOLOGY | Facility: HOSPITAL | Age: 72
Discharge: HOME | End: 2017-10-27
Attending: PHYSICIAN ASSISTANT
Payer: MEDICARE

## 2017-10-27 DIAGNOSIS — Z12.31 ENCOUNTER FOR SCREENING MAMMOGRAM FOR BREAST CANCER: ICD-10-CM

## 2017-10-27 DIAGNOSIS — Z78.0 POSTMENOPAUSAL STATE: ICD-10-CM

## 2017-10-27 DIAGNOSIS — I10 UNCONTROLLED HYPERTENSION: ICD-10-CM

## 2017-10-27 PROCEDURE — 93351 STRESS TTE COMPLETE: CPT

## 2017-10-27 PROCEDURE — 77063 BREAST TOMOSYNTHESIS BI: CPT | Mod: 26,,, | Performed by: RADIOLOGY

## 2017-10-27 PROCEDURE — 77067 SCR MAMMO BI INCL CAD: CPT | Mod: 26,,, | Performed by: RADIOLOGY

## 2017-10-27 PROCEDURE — 77080 DXA BONE DENSITY AXIAL: CPT | Mod: 26,,, | Performed by: RADIOLOGY

## 2017-10-27 PROCEDURE — 93321 DOPPLER ECHO F-UP/LMTD STD: CPT | Mod: 26,,, | Performed by: INTERNAL MEDICINE

## 2017-10-27 PROCEDURE — 77080 DXA BONE DENSITY AXIAL: CPT | Mod: TC

## 2017-10-27 PROCEDURE — 77067 SCR MAMMO BI INCL CAD: CPT | Mod: TC

## 2017-10-27 PROCEDURE — 93351 STRESS TTE COMPLETE: CPT | Mod: 26,,, | Performed by: INTERNAL MEDICINE

## 2017-10-30 LAB
DIASTOLIC DYSFUNCTION: YES
ESTIMATED PA SYSTOLIC PRESSURE: 39.72
MITRAL VALVE REGURGITATION: ABNORMAL
RETIRED EF AND QEF - SEE NOTES: 55 (ref 55–65)
TRICUSPID VALVE REGURGITATION: ABNORMAL

## 2018-02-15 ENCOUNTER — OFFICE VISIT (OUTPATIENT)
Dept: FAMILY MEDICINE | Facility: CLINIC | Age: 73
End: 2018-02-15
Payer: MEDICARE

## 2018-02-15 VITALS
HEART RATE: 64 BPM | BODY MASS INDEX: 31.18 KG/M2 | TEMPERATURE: 99 F | SYSTOLIC BLOOD PRESSURE: 124 MMHG | RESPIRATION RATE: 18 BRPM | HEIGHT: 61 IN | DIASTOLIC BLOOD PRESSURE: 82 MMHG | OXYGEN SATURATION: 98 % | WEIGHT: 165.13 LBS

## 2018-02-15 DIAGNOSIS — I10 BENIGN ESSENTIAL HYPERTENSION: ICD-10-CM

## 2018-02-15 DIAGNOSIS — Z00.00 ANNUAL PHYSICAL EXAM: Primary | ICD-10-CM

## 2018-02-15 DIAGNOSIS — E03.9 HYPOTHYROIDISM, UNSPECIFIED TYPE: ICD-10-CM

## 2018-02-15 PROCEDURE — 99999 PR PBB SHADOW E&M-EST. PATIENT-LVL IV: CPT | Mod: PBBFAC,,, | Performed by: PHYSICIAN ASSISTANT

## 2018-02-15 PROCEDURE — 99397 PER PM REEVAL EST PAT 65+ YR: CPT | Mod: S$GLB,,, | Performed by: PHYSICIAN ASSISTANT

## 2018-02-15 PROCEDURE — 99499 UNLISTED E&M SERVICE: CPT | Mod: S$GLB,,, | Performed by: PHYSICIAN ASSISTANT

## 2018-02-15 RX ORDER — AMLODIPINE BESYLATE 10 MG/1
10 TABLET ORAL DAILY
Qty: 90 TABLET | Refills: 3 | Status: SHIPPED | OUTPATIENT
Start: 2018-02-15 | End: 2019-02-14 | Stop reason: SDUPTHER

## 2018-02-15 RX ORDER — LEVOTHYROXINE SODIUM 25 UG/1
25 TABLET ORAL DAILY
Qty: 90 TABLET | Refills: 3 | Status: SHIPPED | OUTPATIENT
Start: 2018-02-15 | End: 2019-02-14 | Stop reason: SDUPTHER

## 2018-02-15 NOTE — PROGRESS NOTES
Subjective:       Patient ID: Kristin Goodman is a 72 y.o. female.    Chief Complaint: Annual Exam    73 yo female presents for annual. Recently started working part time and increased cardio activity. States she feels much less stress and her pressure is improving.   Hypertension   This is a chronic problem. The current episode started more than 1 year ago. The problem is controlled. Pertinent negatives include no blurred vision, chest pain, headaches, peripheral edema or shortness of breath. Past treatments include calcium channel blockers and beta blockers. There is no history of CAD/MI or CVA.     Review of Systems   Constitutional: Negative for unexpected weight change.   Eyes: Negative for blurred vision.   Respiratory: Negative for cough and shortness of breath.    Cardiovascular: Negative for chest pain.   Gastrointestinal: Negative for abdominal pain, blood in stool, constipation, diarrhea, nausea and vomiting.   Genitourinary: Negative for difficulty urinating and dysuria.   Skin: Negative for rash.   Neurological: Negative for dizziness and headaches.   Psychiatric/Behavioral: Negative for dysphoric mood and sleep disturbance. The patient is not nervous/anxious.        Objective:      Physical Exam   Constitutional: She is oriented to person, place, and time. She appears well-developed and well-nourished. No distress.   HENT:   Head: Normocephalic and atraumatic.   Right Ear: Tympanic membrane and ear canal normal.   Left Ear: Tympanic membrane and ear canal normal.   Eyes: EOM are normal. Pupils are equal, round, and reactive to light.   Cardiovascular: Normal rate and regular rhythm.    Pulmonary/Chest: Effort normal and breath sounds normal.   Abdominal: Soft. Bowel sounds are normal. She exhibits no distension. There is no tenderness.   Neurological: She is alert and oriented to person, place, and time.   Skin: Skin is warm and dry. She is not diaphoretic.   Psychiatric: She has a normal mood and  affect. Her behavior is normal.   Vitals reviewed.      Assessment:       1. Annual physical exam    2. Hypothyroidism, unspecified type    3. Benign essential hypertension        Plan:         Kristin was seen today for annual exam.    Diagnoses and all orders for this visit:    Annual physical exam  -   Eye and dental exam UTD, all screening UTD, labs today    Hypothyroidism, unspecified type  -     levothyroxine (SYNTHROID) 25 MCG tablet; Take 1 tablet (25 mcg total) by mouth once daily.    Benign essential hypertension  -     amLODIPine (NORVASC) 10 MG tablet; Take 1 tablet (10 mg total) by mouth once daily.  -     Comprehensive metabolic panel; Future  -     Lipid panel; Future  -     CBC auto differential; Future

## 2018-05-08 ENCOUNTER — PES CALL (OUTPATIENT)
Dept: ADMINISTRATIVE | Facility: CLINIC | Age: 73
End: 2018-05-08

## 2018-05-15 ENCOUNTER — LAB VISIT (OUTPATIENT)
Dept: LAB | Facility: HOSPITAL | Age: 73
End: 2018-05-15
Attending: PHYSICIAN ASSISTANT
Payer: MEDICARE

## 2018-05-15 ENCOUNTER — OFFICE VISIT (OUTPATIENT)
Dept: FAMILY MEDICINE | Facility: CLINIC | Age: 73
End: 2018-05-15
Payer: MEDICARE

## 2018-05-15 VITALS
BODY MASS INDEX: 31.34 KG/M2 | OXYGEN SATURATION: 99 % | HEART RATE: 81 BPM | SYSTOLIC BLOOD PRESSURE: 149 MMHG | RESPIRATION RATE: 16 BRPM | DIASTOLIC BLOOD PRESSURE: 80 MMHG | HEIGHT: 61 IN | WEIGHT: 166 LBS | TEMPERATURE: 98 F

## 2018-05-15 DIAGNOSIS — I10 ESSENTIAL HYPERTENSION: ICD-10-CM

## 2018-05-15 DIAGNOSIS — I10 BENIGN ESSENTIAL HYPERTENSION: ICD-10-CM

## 2018-05-15 DIAGNOSIS — Z00.00 ENCOUNTER FOR PREVENTIVE HEALTH EXAMINATION: Primary | ICD-10-CM

## 2018-05-15 DIAGNOSIS — E03.9 ACQUIRED HYPOTHYROIDISM: ICD-10-CM

## 2018-05-15 DIAGNOSIS — M47.819 FACET ARTHROPATHY: ICD-10-CM

## 2018-05-15 LAB
ALBUMIN SERPL BCP-MCNC: 3.8 G/DL
ALP SERPL-CCNC: 78 U/L
ALT SERPL W/O P-5'-P-CCNC: 26 U/L
ANION GAP SERPL CALC-SCNC: 15 MMOL/L
AST SERPL-CCNC: 36 U/L
BASOPHILS # BLD AUTO: 0.03 K/UL
BASOPHILS NFR BLD: 0.3 %
BILIRUB SERPL-MCNC: 0.2 MG/DL
BUN SERPL-MCNC: 18 MG/DL
CALCIUM SERPL-MCNC: 9.8 MG/DL
CHLORIDE SERPL-SCNC: 104 MMOL/L
CHOLEST SERPL-MCNC: 169 MG/DL
CHOLEST/HDLC SERPL: 3.8 {RATIO}
CO2 SERPL-SCNC: 23 MMOL/L
CREAT SERPL-MCNC: 0.9 MG/DL
DIFFERENTIAL METHOD: ABNORMAL
EOSINOPHIL # BLD AUTO: 0.1 K/UL
EOSINOPHIL NFR BLD: 1.5 %
ERYTHROCYTE [DISTWIDTH] IN BLOOD BY AUTOMATED COUNT: 14.2 %
EST. GFR  (AFRICAN AMERICAN): >60 ML/MIN/1.73 M^2
EST. GFR  (NON AFRICAN AMERICAN): >60 ML/MIN/1.73 M^2
GLUCOSE SERPL-MCNC: 81 MG/DL
HCT VFR BLD AUTO: 39 %
HDLC SERPL-MCNC: 45 MG/DL
HDLC SERPL: 26.6 %
HGB BLD-MCNC: 12.4 G/DL
IMM GRANULOCYTES # BLD AUTO: 0.04 K/UL
IMM GRANULOCYTES NFR BLD AUTO: 0.4 %
LDLC SERPL CALC-MCNC: 97.8 MG/DL
LYMPHOCYTES # BLD AUTO: 3.3 K/UL
LYMPHOCYTES NFR BLD: 35.1 %
MCH RBC QN AUTO: 28.8 PG
MCHC RBC AUTO-ENTMCNC: 31.8 G/DL
MCV RBC AUTO: 91 FL
MONOCYTES # BLD AUTO: 1.2 K/UL
MONOCYTES NFR BLD: 12.5 %
NEUTROPHILS # BLD AUTO: 4.7 K/UL
NEUTROPHILS NFR BLD: 50.2 %
NONHDLC SERPL-MCNC: 124 MG/DL
NRBC BLD-RTO: 0 /100 WBC
PLATELET # BLD AUTO: 333 K/UL
PMV BLD AUTO: 11.4 FL
POTASSIUM SERPL-SCNC: 3.2 MMOL/L
PROT SERPL-MCNC: 8.1 G/DL
RBC # BLD AUTO: 4.3 M/UL
SODIUM SERPL-SCNC: 142 MMOL/L
TRIGL SERPL-MCNC: 131 MG/DL
TSH SERPL DL<=0.005 MIU/L-ACNC: 1.52 UIU/ML
WBC # BLD AUTO: 9.31 K/UL

## 2018-05-15 PROCEDURE — 85025 COMPLETE CBC W/AUTO DIFF WBC: CPT

## 2018-05-15 PROCEDURE — 3079F DIAST BP 80-89 MM HG: CPT | Mod: CPTII,S$GLB,, | Performed by: PHYSICIAN ASSISTANT

## 2018-05-15 PROCEDURE — 36415 COLL VENOUS BLD VENIPUNCTURE: CPT | Mod: PO

## 2018-05-15 PROCEDURE — 99499 UNLISTED E&M SERVICE: CPT | Mod: S$PBB,,, | Performed by: PHYSICIAN ASSISTANT

## 2018-05-15 PROCEDURE — G0439 PPPS, SUBSEQ VISIT: HCPCS | Mod: S$GLB,,, | Performed by: PHYSICIAN ASSISTANT

## 2018-05-15 PROCEDURE — 84443 ASSAY THYROID STIM HORMONE: CPT

## 2018-05-15 PROCEDURE — 99999 PR PBB SHADOW E&M-EST. PATIENT-LVL V: CPT | Mod: PBBFAC,,, | Performed by: PHYSICIAN ASSISTANT

## 2018-05-15 PROCEDURE — 3077F SYST BP >= 140 MM HG: CPT | Mod: CPTII,S$GLB,, | Performed by: PHYSICIAN ASSISTANT

## 2018-05-15 PROCEDURE — 80061 LIPID PANEL: CPT

## 2018-05-15 PROCEDURE — 80053 COMPREHEN METABOLIC PANEL: CPT

## 2018-05-15 NOTE — PATIENT INSTRUCTIONS
Counseling and Referral of Other Preventative  (Italic type indicates deductible and co-insurance are waived)    Patient Name: Kristin Goodman  Today's Date: 5/15/2018    Health Maintenance       Date Due Completion Date    Influenza Vaccine 08/01/2018 9/15/2017 (Declined)    Override on 9/15/2017: Declined    Override on 2/19/2016: Declined    Override on 3/9/2015: Declined    Mammogram 10/27/2018 10/27/2017    Colonoscopy 06/04/2019 6/4/2012    DEXA SCAN 10/27/2020 10/27/2017    Lipid Panel 05/05/2022 5/5/2017    TETANUS VACCINE 08/16/2026 8/16/2016        No orders of the defined types were placed in this encounter.    The following information is provided to all patients.  This information is to help you find resources for any of the problems found today that may be affecting your health:                Living healthy guide: www.[facility].[place].gov      Understanding Diabetes: www.diabetes.org      Eating healthy: www.cdc.gov/healthyweight      [facility] home safety checklist: www.cdc.gov/steadi/patient.html      Agency on Aging: www.goea.[place].[facility]      Alcoholics anonymous (AA): www.aa.org      Physical Activity: www.kulwant.nih.gov/jc2zmmh      Tobacco use: www.quitwithusla.org

## 2018-05-16 PROBLEM — M47.819 FACET ARTHROPATHY: Status: ACTIVE | Noted: 2018-05-16

## 2018-05-16 NOTE — PROGRESS NOTES
"Kristin Goodman presented for a  Medicare AWV and comprehensive Health Risk Assessment today. The following components were reviewed and updated:    · Medical history  · Family History  · Social history  · Allergies and Current Medications  · Health Risk Assessment  · Health Maintenance  · Care Team     ** See Completed Assessments for Annual Wellness Visit within the encounter summary.**       The following assessments were completed:  · Living Situation  · CAGE  · Depression Screening  · Timed Get Up and Go  · Whisper Test  · Cognitive Function Screening  · Nutrition Screening  · ADL Screening  · PAQ Screening    Vitals:    05/15/18 1520 05/15/18 1531 05/15/18 1557   BP: (!) 170/102 (!) 150/110 (!) 149/80   Pulse: 81     Resp: 16     Temp: 98.2 °F (36.8 °C)     TempSrc: Oral     SpO2: 99%     Weight: 75.3 kg (166 lb 0.1 oz)     Height: 5' 1" (1.549 m)       Body mass index is 31.37 kg/m².  Physical Exam   Constitutional: She is oriented to person, place, and time. She appears well-developed and well-nourished. No distress.   Cardiovascular: Normal rate and regular rhythm.    Pulmonary/Chest: Effort normal and breath sounds normal.   Neurological: She is alert and oriented to person, place, and time.   Skin: Skin is warm and dry. She is not diaphoretic.   Psychiatric: She has a normal mood and affect. Her behavior is normal.   Vitals reviewed.        Diagnoses and health risks identified today and associated recommendations/orders:    1. Encounter for preventive health examination  Provided Kristin with a 5-10 year written screening schedule and personal prevention plan. Recommendations were developed using the USPSTF age appropriate recommendations. Education, counseling, and referrals were provided as needed. After Visit Summary printed and given to patient which includes a list of additional screenings\tests needed.    2. Acquired hypothyroidism  Continue meds, labs today    3. Essential hypertension  Uncontrolled " today return in 1 week for nurse check    4. Facet arthropathy  Stable, monitor       No Follow-up on file.    Arlene Billings PA-C

## 2018-05-18 ENCOUNTER — CLINICAL SUPPORT (OUTPATIENT)
Dept: FAMILY MEDICINE | Facility: CLINIC | Age: 73
End: 2018-05-18
Payer: MEDICARE

## 2018-05-18 VITALS — SYSTOLIC BLOOD PRESSURE: 128 MMHG | DIASTOLIC BLOOD PRESSURE: 88 MMHG

## 2018-05-18 DIAGNOSIS — I10 ESSENTIAL HYPERTENSION: Primary | ICD-10-CM

## 2018-05-18 PROCEDURE — 99499 UNLISTED E&M SERVICE: CPT | Mod: S$GLB,,, | Performed by: FAMILY MEDICINE

## 2018-05-18 PROCEDURE — 99999 PR PBB SHADOW E&M-EST. PATIENT-LVL I: CPT | Mod: PBBFAC,,,

## 2018-05-18 NOTE — PROGRESS NOTES
Kristin Goodman 73 y.o. female is here today for Blood Pressure check.   History of HTN yes.    Review of patient's allergies indicates:   Allergen Reactions    Penicillins      Other reaction(s): Hives    Sulfa (sulfonamide antibiotics) Rash     Creatinine   Date Value Ref Range Status   05/15/2018 0.9 0.5 - 1.4 mg/dL Final     Sodium   Date Value Ref Range Status   05/15/2018 142 136 - 145 mmol/L Final     Potassium   Date Value Ref Range Status   05/15/2018 3.2 (L) 3.5 - 5.1 mmol/L Final   ]  Patient verifies taking blood pressure medications on a regular basis at the same time of the day.     Current Outpatient Prescriptions:     ACETAMINOPHEN (TYLENOL ARTHRITIS PAIN ORAL), Take by mouth., Disp: , Rfl:     amLODIPine (NORVASC) 10 MG tablet, Take 1 tablet (10 mg total) by mouth once daily., Disp: 90 tablet, Rfl: 3    aspirin 81 MG chewable tablet, Take 81 mg by mouth once daily.  , Disp: , Rfl:     epinastine 0.05 % ophthalmic solution, , Disp: , Rfl:     levothyroxine (SYNTHROID) 25 MCG tablet, Take 1 tablet (25 mcg total) by mouth once daily., Disp: 90 tablet, Rfl: 3    metoprolol succinate (TOPROL-XL) 50 MG 24 hr tablet, Take 1 and 1/2 tablet daily, Disp: 135 tablet, Rfl: 1    vitamin renal formula, B-complex-vitamin c-folic acid, (TRIPHROCAPS) 1 mg Cap, Take 1 capsule by mouth once daily., Disp: 90 capsule, Rfl: 1  Does patient have record of home blood pressure readings yes. Readings have been averaging 140's-170's/ 60's-80's.   Last dose of blood pressure medication was taken at 5am 5/18/18.  Patient is asymptomatic.   Complains of no complaints.    Vitals:    05/18/18 1427 05/18/18 1445   BP: (!) 148/94 128/88   BP Location: Right arm Right arm   Patient Position: Sitting Sitting   BP Method: Medium (Manual) Medium (Manual)         Dr. Kan informed of nurse visit.

## 2018-05-28 DIAGNOSIS — I10 UNCONTROLLED HYPERTENSION: ICD-10-CM

## 2018-05-28 RX ORDER — B COMPLEX, C NO.20/FOLIC ACID 1 MG
CAPSULE ORAL
Qty: 90 CAPSULE | Refills: 1 | Status: SHIPPED | OUTPATIENT
Start: 2018-05-28 | End: 2018-12-04 | Stop reason: SDUPTHER

## 2018-06-13 ENCOUNTER — HOSPITAL ENCOUNTER (OUTPATIENT)
Dept: RADIOLOGY | Facility: HOSPITAL | Age: 73
Discharge: HOME | End: 2018-06-13
Attending: INTERNAL MEDICINE
Payer: MEDICARE

## 2018-06-13 ENCOUNTER — OFFICE VISIT (OUTPATIENT)
Dept: RHEUMATOLOGY | Facility: CLINIC | Age: 73
End: 2018-06-13
Payer: MEDICARE

## 2018-06-13 VITALS
HEART RATE: 82 BPM | WEIGHT: 166 LBS | SYSTOLIC BLOOD PRESSURE: 146 MMHG | BODY MASS INDEX: 31.37 KG/M2 | DIASTOLIC BLOOD PRESSURE: 81 MMHG | RESPIRATION RATE: 18 BRPM

## 2018-06-13 DIAGNOSIS — M70.61 GREATER TROCHANTERIC BURSITIS OF RIGHT HIP: ICD-10-CM

## 2018-06-13 DIAGNOSIS — M70.61 GREATER TROCHANTERIC BURSITIS OF RIGHT HIP: Primary | ICD-10-CM

## 2018-06-13 DIAGNOSIS — M15.9 PRIMARY OSTEOARTHRITIS INVOLVING MULTIPLE JOINTS: ICD-10-CM

## 2018-06-13 PROCEDURE — 20610 DRAIN/INJ JOINT/BURSA W/O US: CPT | Mod: RT,S$GLB,, | Performed by: INTERNAL MEDICINE

## 2018-06-13 PROCEDURE — 3077F SYST BP >= 140 MM HG: CPT | Mod: CPTII,S$GLB,, | Performed by: INTERNAL MEDICINE

## 2018-06-13 PROCEDURE — 3079F DIAST BP 80-89 MM HG: CPT | Mod: CPTII,S$GLB,, | Performed by: INTERNAL MEDICINE

## 2018-06-13 PROCEDURE — 99999 PR PBB SHADOW E&M-EST. PATIENT-LVL III: CPT | Mod: PBBFAC,,, | Performed by: INTERNAL MEDICINE

## 2018-06-13 PROCEDURE — 73502 X-RAY EXAM HIP UNI 2-3 VIEWS: CPT | Mod: TC,RT

## 2018-06-13 PROCEDURE — 73502 X-RAY EXAM HIP UNI 2-3 VIEWS: CPT | Mod: 26,RT,, | Performed by: INTERNAL MEDICINE

## 2018-06-13 PROCEDURE — 99213 OFFICE O/P EST LOW 20 MIN: CPT | Mod: 25,S$GLB,, | Performed by: INTERNAL MEDICINE

## 2018-06-13 RX ORDER — TRIAMCINOLONE ACETONIDE 40 MG/ML
40 INJECTION, SUSPENSION INTRA-ARTICULAR; INTRAMUSCULAR
Status: DISCONTINUED | OUTPATIENT
Start: 2018-06-13 | End: 2018-06-13 | Stop reason: HOSPADM

## 2018-06-13 RX ADMIN — TRIAMCINOLONE ACETONIDE 40 MG: 40 INJECTION, SUSPENSION INTRA-ARTICULAR; INTRAMUSCULAR at 01:06

## 2018-06-13 NOTE — PROCEDURES
Large Joint Aspiration/Injection  Date/Time: 6/13/2018 1:17 PM  Performed by: JANICE CAMPO  Authorized by: JANICE CAMPO     Consent Done?:  Yes (Verbal)  Indications:  Pain  Procedure site marked: Yes      Location:  Hip  Site:  R greater trochanteric bursa  Prep: Patient was prepped and draped in usual sterile fashion    Needle size:  25 G  Approach:  Lateral  Medications:  40 mg triamcinolone acetonide 40 mg/mL  Patient tolerance:  Patient tolerated the procedure well with no immediate complications

## 2018-06-13 NOTE — PROGRESS NOTES
History of present illness:  73-year-old female with osteoarthritis.  She was last seen 1 year ago.  She complains of increased pain in the right hip for the past month.  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  This is a recurrent problem.  Her last bad episode was 2 years ago.  I injected the trochanteric bursa.  This did help.  The pain is worse with activity.  It does wake her up at night when she rolls on the area.  She has some morning pain.  She has no similar pain on the left side.  She has had some pain in the buttocks.  She had 1 episode when the pain radiated down the leg.  She has had no swelling in the leg.  She has had no numbness or tingling.  She has been taking Tylenol on bio freeze without any response.  Ice gave her some relief.  Her blood pressure has been stable.  She has had no other recent medical problems.    Physical examination:  Musculoskeletal:  Shoulders, elbows, wrists, small joints of the hands are unremarkable.  She has tenderness to palpation of the lower lumbar spine.  She has tenderness to palpation in the buttocks and the right greater trochanter.  She has pain on lateral bending of the spine to the left which is referred to the right side.  She has some pain on flexion and extension.  She has pain on range of motion of the right hip more than the left hip  Knees, ankles, feet are unremarkable.    Assessment:  1.  The main pain generator at this time is trochanteric bursitis  2.  I suspect we are also dealing with osteoarthritis of the hip  3.  She may be having some referred pain from the back    Plans:  1.  I will see how she response to the injection  2.  X-ray of the hip is obtained  3.  Continue the use of over-the-counter medications  4.  Return to see me in 12 months

## 2018-06-19 DIAGNOSIS — I10 UNCONTROLLED HYPERTENSION: ICD-10-CM

## 2018-06-19 RX ORDER — METOPROLOL SUCCINATE 50 MG/1
TABLET, EXTENDED RELEASE ORAL
Qty: 135 TABLET | Refills: 1 | Status: SHIPPED | OUTPATIENT
Start: 2018-06-19 | End: 2019-02-14

## 2018-08-06 ENCOUNTER — OFFICE VISIT (OUTPATIENT)
Dept: FAMILY MEDICINE | Facility: CLINIC | Age: 73
End: 2018-08-06
Payer: MEDICARE

## 2018-08-06 VITALS
DIASTOLIC BLOOD PRESSURE: 90 MMHG | TEMPERATURE: 99 F | HEIGHT: 61 IN | OXYGEN SATURATION: 98 % | WEIGHT: 166.69 LBS | HEART RATE: 96 BPM | SYSTOLIC BLOOD PRESSURE: 146 MMHG | BODY MASS INDEX: 31.47 KG/M2

## 2018-08-06 DIAGNOSIS — I10 UNCONTROLLED HYPERTENSION: Primary | ICD-10-CM

## 2018-08-06 PROCEDURE — 99214 OFFICE O/P EST MOD 30 MIN: CPT | Mod: S$GLB,,, | Performed by: FAMILY MEDICINE

## 2018-08-06 PROCEDURE — 99999 PR PBB SHADOW E&M-EST. PATIENT-LVL III: CPT | Mod: PBBFAC,,, | Performed by: FAMILY MEDICINE

## 2018-08-06 PROCEDURE — 3077F SYST BP >= 140 MM HG: CPT | Mod: CPTII,S$GLB,, | Performed by: FAMILY MEDICINE

## 2018-08-06 PROCEDURE — 3080F DIAST BP >= 90 MM HG: CPT | Mod: CPTII,S$GLB,, | Performed by: FAMILY MEDICINE
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
## 2018-10-05 ENCOUNTER — HOSPITAL ENCOUNTER (EMERGENCY)
Facility: HOSPITAL | Age: 73
Discharge: HOME | End: 2018-10-05
Attending: EMERGENCY MEDICINE
Payer: MEDICARE

## 2018-10-05 VITALS
HEART RATE: 62 BPM | HEIGHT: 61 IN | TEMPERATURE: 98 F | OXYGEN SATURATION: 98 % | SYSTOLIC BLOOD PRESSURE: 169 MMHG | BODY MASS INDEX: 31.53 KG/M2 | RESPIRATION RATE: 18 BRPM | DIASTOLIC BLOOD PRESSURE: 77 MMHG | WEIGHT: 167 LBS

## 2018-10-05 DIAGNOSIS — M54.6 LEFT-SIDED THORACIC BACK PAIN: Primary | ICD-10-CM

## 2018-10-05 PROCEDURE — 25000003 PHARM REV CODE 250: Performed by: PHYSICIAN ASSISTANT

## 2018-10-05 PROCEDURE — 99284 EMERGENCY DEPT VISIT MOD MDM: CPT | Mod: 25

## 2018-10-05 RX ORDER — LIDOCAINE 50 MG/G
1 PATCH TOPICAL
Status: DISCONTINUED | OUTPATIENT
Start: 2018-10-05 | End: 2018-10-05 | Stop reason: HOSPADM

## 2018-10-05 RX ORDER — LIDOCAINE 50 MG/G
1 PATCH TOPICAL DAILY
Qty: 15 PATCH | Refills: 0 | Status: SHIPPED | OUTPATIENT
Start: 2018-10-05 | End: 2018-10-17

## 2018-10-05 RX ORDER — BACLOFEN 10 MG/1
TABLET ORAL
Qty: 15 TABLET | Refills: 0 | Status: SHIPPED | OUTPATIENT
Start: 2018-10-05 | End: 2018-10-17 | Stop reason: SDUPTHER

## 2018-10-05 RX ORDER — DICLOFENAC SODIUM 10 MG/G
1 GEL TOPICAL 2 TIMES DAILY
Qty: 100 G | Refills: 0 | Status: SHIPPED | OUTPATIENT
Start: 2018-10-05 | End: 2019-02-14

## 2018-10-05 RX ADMIN — BACLOFEN 5 MG: 10 TABLET ORAL at 04:10

## 2018-10-05 RX ADMIN — LIDOCAINE 1 PATCH: 50 PATCH TOPICAL at 04:10
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
## 2018-10-05 NOTE — DISCHARGE INSTRUCTIONS
Take medications as prescribed.  Baclofen as needed for muscle soreness.  Apply lidocaine patch daily.  Diclofenac gel to area of pain twice daily.  Get some good rest. Follow up with primary care physician early next week for re-evaluation.  Return to this ED if pain worsens or persists despite treatment, if you begin with chest pain, if you begin with ripping/tearing sensation, if any other problems occur.

## 2018-10-05 NOTE — ED TRIAGE NOTES
Pt c/o back pain x 2 days. Denies injury. Tylenol was last taken last night about 2100; denies relief.

## 2018-10-17 ENCOUNTER — OFFICE VISIT (OUTPATIENT)
Dept: RHEUMATOLOGY | Facility: CLINIC | Age: 73
End: 2018-10-17
Payer: MEDICARE

## 2018-10-17 VITALS
WEIGHT: 169.75 LBS | HEIGHT: 61 IN | DIASTOLIC BLOOD PRESSURE: 80 MMHG | HEART RATE: 64 BPM | BODY MASS INDEX: 32.05 KG/M2 | SYSTOLIC BLOOD PRESSURE: 143 MMHG

## 2018-10-17 DIAGNOSIS — M70.61 GREATER TROCHANTERIC BURSITIS OF RIGHT HIP: ICD-10-CM

## 2018-10-17 DIAGNOSIS — M15.9 PRIMARY OSTEOARTHRITIS INVOLVING MULTIPLE JOINTS: Primary | ICD-10-CM

## 2018-10-17 DIAGNOSIS — M47.819 FACET ARTHROPATHY: ICD-10-CM

## 2018-10-17 DIAGNOSIS — E03.9 ACQUIRED HYPOTHYROIDISM: ICD-10-CM

## 2018-10-17 DIAGNOSIS — I10 ESSENTIAL HYPERTENSION: ICD-10-CM

## 2018-10-17 DIAGNOSIS — Z55.9 EDUCATIONAL CIRCUMSTANCE: ICD-10-CM

## 2018-10-17 PROCEDURE — 3079F DIAST BP 80-89 MM HG: CPT | Mod: CPTII,GC,, | Performed by: INTERNAL MEDICINE

## 2018-10-17 PROCEDURE — 3077F SYST BP >= 140 MM HG: CPT | Mod: CPTII,GC,, | Performed by: INTERNAL MEDICINE

## 2018-10-17 PROCEDURE — 99214 OFFICE O/P EST MOD 30 MIN: CPT | Mod: PBBFAC | Performed by: INTERNAL MEDICINE

## 2018-10-17 PROCEDURE — 1100F PTFALLS ASSESS-DOCD GE2>/YR: CPT | Mod: CPTII,GC,, | Performed by: INTERNAL MEDICINE

## 2018-10-17 PROCEDURE — 3288F FALL RISK ASSESSMENT DOCD: CPT | Mod: CPTII,GC,, | Performed by: INTERNAL MEDICINE

## 2018-10-17 PROCEDURE — 99214 OFFICE O/P EST MOD 30 MIN: CPT | Mod: S$PBB,GC,, | Performed by: INTERNAL MEDICINE

## 2018-10-17 PROCEDURE — 99999 PR PBB SHADOW E&M-EST. PATIENT-LVL IV: CPT | Mod: PBBFAC,GC,, | Performed by: INTERNAL MEDICINE

## 2018-10-17 RX ORDER — BACLOFEN 10 MG/1
TABLET ORAL
Qty: 30 TABLET | Refills: 0 | Status: SHIPPED | OUTPATIENT
Start: 2018-10-17 | End: 2019-01-19 | Stop reason: SDUPTHER

## 2018-10-17 SDOH — SOCIAL DETERMINANTS OF HEALTH (SDOH): PROBLEMS RELATED TO EDUCATION AND LITERACY, UNSPECIFIED: Z55.9

## 2018-10-17 ASSESSMENT — ROUTINE ASSESSMENT OF PATIENT INDEX DATA (RAPID3)
FATIGUE SCORE: 8.5
MDHAQ FUNCTION SCORE: .4
AM STIFFNESS SCORE: 1, YES
TOTAL RAPID3 SCORE: 5.78
WHEN YOU AWAKENED IN THE MORNING OVER THE LAST WEEK, PLEASE INDICATE THE AMOUNT OF TIME IT TAKES UNTIL YOU ARE AS LIMBER AS YOU WILL BE FOR THE DAY: 4HRS
PAIN SCORE: 8.5
PATIENT GLOBAL ASSESSMENT SCORE: 7.5
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
## 2018-10-17 NOTE — PATIENT INSTRUCTIONS
Continue biofreeze    Trial of two old goat cream     Physical therapy    Continue tyelnol and use baclofen as needed. Back Exercise to Keep Fit for Low Back Pain  To help in your recovery and to prevent further back problems, keep your back, abdominal muscles and legs strong. Walk daily as soon as you can. Gradually add other physical activities such as swimming and biking, which can help improve lower back strength. Begin as soon as you can do them comfortably. Do not do any exercises that make your pain a lot worse. The following are some back exercises that can help relieve low back pain.     Pelvic tilt   Repeat 5-10 times, twice per day.  Lie flat on your back (or stand with your back to a wall), knees bent, feet flat on the floor, body relaxed. Tighten your abdominal and buttock muscles, and tilt your pelvis. The curve of the small of your back should flatten towards the floor (or wall). Hold 10 seconds and then relax.       Knee raise     Repeat 5-10 times, twice per day.    Lie flat on your back, knees bent. Bring one knee slowly to your chest. Hug your knee gently. Then lower your leg toward the floor, keeping your knee bent. Do not straighten your legs. Repeat exercise with your other leg.              Partial press-up     Lie face down on a soft, firm surface. Do not turn your head to either side. Rest your arms bent at the elbows alongside your body. Relax for a few minutes. Then raise your upper body enough to lean on your elbows. Relax your lower back and legs as much as possible. Hold this position for 30 seconds at first. Gradually work up to five minutes. Or try slow press-ups. Hold each for five seconds, and repeat five to six times.              Copyright © 2012 by [employer]   Sherice MENDOZA, Aneesh DE LA GARZA, Calvin CHOUDHARY, Mady B, Kali R, Sofie B, Joon K, Morgan C, Darren B, Yousuf S, Ofelia L, Bailey R. [employer]. Adult Acute and  Subacute Low Back Pain. Updated November 2012.

## 2018-10-19 NOTE — PROGRESS NOTES
I have personally taken the history and examined the patient to verify the fellow's notation, and I agree with the impression and plan stated.     She has OA that is impairing her activity and we will try PT   Possibly add Cymbalta next visit

## 2018-12-04 DIAGNOSIS — I10 UNCONTROLLED HYPERTENSION: ICD-10-CM

## 2018-12-04 RX ORDER — B COMPLEX, C NO.20/FOLIC ACID 1 MG
CAPSULE ORAL
Qty: 90 CAPSULE | Refills: 1 | Status: SHIPPED | OUTPATIENT
Start: 2018-12-04 | End: 2019-06-20 | Stop reason: SDUPTHER

## 2019-01-19 DIAGNOSIS — M70.61 GREATER TROCHANTERIC BURSITIS OF RIGHT HIP: ICD-10-CM

## 2019-01-21 RX ORDER — BACLOFEN 10 MG/1
TABLET ORAL
Qty: 30 TABLET | Refills: 1 | Status: SHIPPED | OUTPATIENT
Start: 2019-01-21 | End: 2019-02-14

## 2019-02-11 ENCOUNTER — PES CALL (OUTPATIENT)
Dept: ADMINISTRATIVE | Facility: CLINIC | Age: 74
End: 2019-02-11

## 2019-02-14 ENCOUNTER — TELEPHONE (OUTPATIENT)
Dept: FAMILY MEDICINE | Facility: CLINIC | Age: 74
End: 2019-02-14

## 2019-02-14 ENCOUNTER — OFFICE VISIT (OUTPATIENT)
Dept: FAMILY MEDICINE | Facility: CLINIC | Age: 74
End: 2019-02-14
Payer: MEDICARE

## 2019-02-14 ENCOUNTER — LAB VISIT (OUTPATIENT)
Dept: LAB | Facility: HOSPITAL | Age: 74
End: 2019-02-14
Attending: FAMILY MEDICINE
Payer: MEDICARE

## 2019-02-14 VITALS
WEIGHT: 170 LBS | HEART RATE: 79 BPM | BODY MASS INDEX: 32.1 KG/M2 | RESPIRATION RATE: 17 BRPM | HEIGHT: 61 IN | OXYGEN SATURATION: 98 % | TEMPERATURE: 98 F | SYSTOLIC BLOOD PRESSURE: 118 MMHG | DIASTOLIC BLOOD PRESSURE: 70 MMHG

## 2019-02-14 DIAGNOSIS — I10 ESSENTIAL HYPERTENSION, BENIGN: ICD-10-CM

## 2019-02-14 DIAGNOSIS — I10 ESSENTIAL HYPERTENSION, BENIGN: Primary | ICD-10-CM

## 2019-02-14 DIAGNOSIS — R73.03 PREDIABETES: ICD-10-CM

## 2019-02-14 DIAGNOSIS — I34.0 MITRAL VALVE INSUFFICIENCY, UNSPECIFIED ETIOLOGY: ICD-10-CM

## 2019-02-14 DIAGNOSIS — E03.9 ACQUIRED HYPOTHYROIDISM: ICD-10-CM

## 2019-02-14 DIAGNOSIS — I34.0 MITRAL VALVE INSUFFICIENCY, UNSPECIFIED ETIOLOGY: Primary | ICD-10-CM

## 2019-02-14 PROBLEM — M47.819 FACET ARTHROPATHY: Status: RESOLVED | Noted: 2018-05-16 | Resolved: 2019-02-14

## 2019-02-14 PROBLEM — M70.61 GREATER TROCHANTERIC BURSITIS OF RIGHT HIP: Status: RESOLVED | Noted: 2018-10-17 | Resolved: 2019-02-14

## 2019-02-14 LAB
ALBUMIN SERPL BCP-MCNC: 3.7 G/DL
ALP SERPL-CCNC: 80 U/L
ALT SERPL W/O P-5'-P-CCNC: 25 U/L
ANION GAP SERPL CALC-SCNC: 11 MMOL/L
AST SERPL-CCNC: 31 U/L
BILIRUB SERPL-MCNC: 0.3 MG/DL
BUN SERPL-MCNC: 16 MG/DL
CALCIUM SERPL-MCNC: 9.6 MG/DL
CHLORIDE SERPL-SCNC: 106 MMOL/L
CHOLEST SERPL-MCNC: 166 MG/DL
CHOLEST/HDLC SERPL: 3.6 {RATIO}
CO2 SERPL-SCNC: 25 MMOL/L
CREAT SERPL-MCNC: 1 MG/DL
EST. GFR  (AFRICAN AMERICAN): >60 ML/MIN/1.73 M^2
EST. GFR  (NON AFRICAN AMERICAN): 56.8 ML/MIN/1.73 M^2
ESTIMATED AVG GLUCOSE: 117 MG/DL
GLUCOSE SERPL-MCNC: 99 MG/DL
HBA1C MFR BLD HPLC: 5.7 %
HDLC SERPL-MCNC: 46 MG/DL
HDLC SERPL: 27.7 %
LDLC SERPL CALC-MCNC: 93.2 MG/DL
NONHDLC SERPL-MCNC: 120 MG/DL
POTASSIUM SERPL-SCNC: 4.4 MMOL/L
PROT SERPL-MCNC: 8.1 G/DL
SODIUM SERPL-SCNC: 142 MMOL/L
TRIGL SERPL-MCNC: 134 MG/DL
TSH SERPL DL<=0.005 MIU/L-ACNC: 1.56 UIU/ML

## 2019-02-14 PROCEDURE — 1100F PR PT FALLS ASSESS DOC 2+ FALLS/FALL W/INJURY/YR: ICD-10-PCS | Mod: CPTII,S$GLB,, | Performed by: FAMILY MEDICINE

## 2019-02-14 PROCEDURE — 80061 LIPID PANEL: CPT

## 2019-02-14 PROCEDURE — 99214 PR OFFICE/OUTPT VISIT, EST, LEVL IV, 30-39 MIN: ICD-10-PCS | Mod: S$GLB,,, | Performed by: FAMILY MEDICINE

## 2019-02-14 PROCEDURE — 99214 OFFICE O/P EST MOD 30 MIN: CPT | Mod: S$GLB,,, | Performed by: FAMILY MEDICINE

## 2019-02-14 PROCEDURE — 99999 PR PBB SHADOW E&M-EST. PATIENT-LVL III: CPT | Mod: PBBFAC,,, | Performed by: FAMILY MEDICINE

## 2019-02-14 PROCEDURE — 36415 COLL VENOUS BLD VENIPUNCTURE: CPT | Mod: PO

## 2019-02-14 PROCEDURE — 3288F PR FALLS RISK ASSESSMENT DOCUMENTED: ICD-10-PCS | Mod: CPTII,S$GLB,, | Performed by: FAMILY MEDICINE

## 2019-02-14 PROCEDURE — 3074F SYST BP LT 130 MM HG: CPT | Mod: CPTII,S$GLB,, | Performed by: FAMILY MEDICINE

## 2019-02-14 PROCEDURE — 99999 PR PBB SHADOW E&M-EST. PATIENT-LVL III: ICD-10-PCS | Mod: PBBFAC,,, | Performed by: FAMILY MEDICINE

## 2019-02-14 PROCEDURE — 80053 COMPREHEN METABOLIC PANEL: CPT

## 2019-02-14 PROCEDURE — 83036 HEMOGLOBIN GLYCOSYLATED A1C: CPT

## 2019-02-14 PROCEDURE — 3078F DIAST BP <80 MM HG: CPT | Mod: CPTII,S$GLB,, | Performed by: FAMILY MEDICINE

## 2019-02-14 PROCEDURE — 3288F FALL RISK ASSESSMENT DOCD: CPT | Mod: CPTII,S$GLB,, | Performed by: FAMILY MEDICINE

## 2019-02-14 PROCEDURE — 3074F PR MOST RECENT SYSTOLIC BLOOD PRESSURE < 130 MM HG: ICD-10-PCS | Mod: CPTII,S$GLB,, | Performed by: FAMILY MEDICINE

## 2019-02-14 PROCEDURE — 3078F PR MOST RECENT DIASTOLIC BLOOD PRESSURE < 80 MM HG: ICD-10-PCS | Mod: CPTII,S$GLB,, | Performed by: FAMILY MEDICINE

## 2019-02-14 PROCEDURE — 84443 ASSAY THYROID STIM HORMONE: CPT

## 2019-02-14 PROCEDURE — 1100F PTFALLS ASSESS-DOCD GE2>/YR: CPT | Mod: CPTII,S$GLB,, | Performed by: FAMILY MEDICINE

## 2019-02-14 RX ORDER — LEVOTHYROXINE SODIUM 25 UG/1
25 TABLET ORAL DAILY
Qty: 90 TABLET | Refills: 3 | Status: SHIPPED | OUTPATIENT
Start: 2019-02-14 | End: 2020-03-13

## 2019-02-14 RX ORDER — AMLODIPINE BESYLATE 10 MG/1
10 TABLET ORAL DAILY
Qty: 90 TABLET | Refills: 3 | Status: ON HOLD | OUTPATIENT
Start: 2019-02-14 | End: 2019-08-14 | Stop reason: HOSPADM
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
## 2019-02-14 NOTE — TELEPHONE ENCOUNTER
Pt seen today, was told by scheduling that echo order is entered wrong; correct order is [id number]; orders pended
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
## 2019-02-19 ENCOUNTER — HOSPITAL ENCOUNTER (OUTPATIENT)
Dept: CARDIOLOGY | Facility: HOSPITAL | Age: 74
Discharge: HOME | End: 2019-02-19
Attending: FAMILY MEDICINE
Payer: MEDICARE

## 2019-02-19 VITALS — WEIGHT: 169 LBS | HEIGHT: 61 IN | BODY MASS INDEX: 31.91 KG/M2

## 2019-02-19 DIAGNOSIS — I34.0 MITRAL VALVE INSUFFICIENCY, UNSPECIFIED ETIOLOGY: ICD-10-CM

## 2019-02-19 PROCEDURE — 93306 TRANSTHORACIC ECHO (TTE) COMPLETE (CUPID ONLY): ICD-10-PCS | Mod: 26,,, | Performed by: INTERNAL MEDICINE

## 2019-02-19 PROCEDURE — 93306 TTE W/DOPPLER COMPLETE: CPT | Mod: 26,,, | Performed by: INTERNAL MEDICINE

## 2019-02-19 PROCEDURE — 93306 TTE W/DOPPLER COMPLETE: CPT

## 2019-02-20 ENCOUNTER — CLINICAL SUPPORT (OUTPATIENT)
Dept: FAMILY MEDICINE | Facility: CLINIC | Age: 74
End: 2019-02-20
Payer: MEDICARE

## 2019-02-20 VITALS — HEART RATE: 77 BPM | SYSTOLIC BLOOD PRESSURE: 141 MMHG | DIASTOLIC BLOOD PRESSURE: 80 MMHG

## 2019-02-20 DIAGNOSIS — I10 ESSENTIAL HYPERTENSION: Primary | ICD-10-CM

## 2019-02-20 LAB
AORTIC ROOT ANNULUS: 1.7 CM
AORTIC VALVE CUSP SEPERATION: 1.7 CM
ASCENDING AORTA: 2.13 CM
AV INDEX (PROSTH): 1.05
AV MEAN GRADIENT: 2.28 MMHG
AV PEAK GRADIENT: 4.58 MMHG
AV VALVE AREA: 2.37 CM2
AV VELOCITY RATIO: 0.99
BSA FOR ECHO PROCEDURE: 1.82 M2
CV ECHO LV RWT: 0.54 CM
DOP CALC AO PEAK VEL: 1.07 M/S
DOP CALC AO VTI: 24.04 CM
DOP CALC LVOT AREA: 2.27 CM2
DOP CALC LVOT DIAMETER: 1.7 CM
DOP CALC LVOT PEAK VEL: 1.06 M/S
DOP CALC LVOT STROKE VOLUME: 57.01 CM3
DOP CALCLVOT PEAK VEL VTI: 25.13 CM
E WAVE DECELERATION TIME: 212.97 MSEC
E/A RATIO: 1.28
ECHO LV POSTERIOR WALL: 1.04 CM (ref 0.6–1.1)
FRACTIONAL SHORTENING: 40 % (ref 28–44)
INTERVENTRICULAR SEPTUM: 1.03 CM (ref 0.6–1.1)
IVRT: 0.06 MSEC
LA MAJOR: 4.79 CM
LA MINOR: 5 CM
LA WIDTH: 2.61 CM
LEFT ATRIUM SIZE: 3.5 CM
LEFT ATRIUM VOLUME INDEX: 21.6 ML/M2
LEFT ATRIUM VOLUME: 37.99 CM3
LEFT INTERNAL DIMENSION IN SYSTOLE: 2.31 CM (ref 2.1–4)
LEFT VENTRICLE DIASTOLIC VOLUME INDEX: 37.11 ML/M2
LEFT VENTRICLE DIASTOLIC VOLUME: 65.26 ML
LEFT VENTRICLE MASS INDEX: 72.4 G/M2
LEFT VENTRICLE SYSTOLIC VOLUME INDEX: 10.4 ML/M2
LEFT VENTRICLE SYSTOLIC VOLUME: 18.27 ML
LEFT VENTRICULAR INTERNAL DIMENSION IN DIASTOLE: 3.88 CM (ref 3.5–6)
LEFT VENTRICULAR MASS: 127.26 G
MV PEAK A VEL: 1.08 M/S
MV PEAK E VEL: 1.38 M/S
PISA TR MAX VEL: 2.89 M/S
PULM VEIN S/D RATIO: 0.58
PV PEAK D VEL: 0.19 M/S
PV PEAK S VEL: 0.11 M/S
PV PEAK VELOCITY: 0.82 CM/S
RA MAJOR: 3.76 CM
RA PRESSURE: 3 MMHG
RA WIDTH: 3.27 CM
RIGHT VENTRICULAR END-DIASTOLIC DIMENSION: 2.81 CM
RV TISSUE DOPPLER FREE WALL SYSTOLIC VELOCITY 1 (APICAL 4 CHAMBER VIEW): 11.25 M/S
SINUS: 2.05 CM
STJ: 1.45 CM
TR MAX PG: 33.41 MMHG
TRICUSPID ANNULAR PLANE SYSTOLIC EXCURSION: 2.5 CM
TV REST PULMONARY ARTERY PRESSURE: 36 MMHG

## 2019-02-20 PROCEDURE — 99499 UNLISTED E&M SERVICE: CPT | Mod: S$GLB,,, | Performed by: FAMILY MEDICINE

## 2019-02-20 PROCEDURE — 99999 PR PBB SHADOW E&M-EST. PATIENT-LVL I: ICD-10-PCS | Mod: PBBFAC,,,

## 2019-02-20 PROCEDURE — 99999 PR PBB SHADOW E&M-EST. PATIENT-LVL I: CPT | Mod: PBBFAC,,,

## 2019-02-20 PROCEDURE — 99499 NO LOS: ICD-10-PCS | Mod: S$GLB,,, | Performed by: FAMILY MEDICINE

## 2019-02-20 NOTE — PROGRESS NOTES
Kristin SYD Goodman 73 y.o. female is here today for Blood Pressure check.   History of HTN yes.    Review of patient's allergies indicates:   Allergen Reactions    Penicillins      Other reaction(s): Hives    Sulfa (sulfonamide antibiotics) Rash     Creatinine   Date Value Ref Range Status   02/14/2019 1.0 0.5 - 1.4 mg/dL Final     Sodium   Date Value Ref Range Status   02/14/2019 142 136 - 145 mmol/L Final     Potassium   Date Value Ref Range Status   02/14/2019 4.4 3.5 - 5.1 mmol/L Final   ]  Patient verifies taking blood pressure medications on a regular basis at the same time of the day.     Current Outpatient Medications:     ACETAMINOPHEN (TYLENOL ARTHRITIS PAIN ORAL), Take 1 tablet by mouth once daily. , Disp: , Rfl:     amLODIPine (NORVASC) 10 MG tablet, Take 1 tablet (10 mg total) by mouth once daily., Disp: 90 tablet, Rfl: 3    aspirin 81 MG chewable tablet, Take 81 mg by mouth once daily.  , Disp: , Rfl:     epinastine 0.05 % ophthalmic solution, , Disp: , Rfl:     levothyroxine (SYNTHROID) 25 MCG tablet, Take 1 tablet (25 mcg total) by mouth once daily., Disp: 90 tablet, Rfl: 3    TRIPHROCAPS 1 mg Cap, TAKE 1 CAPSULE BY MOUTH ONCE DAILY, Disp: 90 capsule, Rfl: 1  Does patient have record of home blood pressure readings no. Readings have been averaging no readings to report.   Last dose of blood pressure medication was taken at 8 am 2/20/19.  Patient is asymptomatic.   Complains of no complaints.    Vitals:    02/20/19 0901 02/20/19 0902   BP: (!) 142/80 (!) 141/80   BP Location: Right arm Other (Comment)   Patient Position: Sitting Sitting   BP Method: Medium (Manual) Small (Automatic)   Pulse:  77         Dr. Kan informed of nurse visit.

## 2019-02-22 ENCOUNTER — TELEPHONE (OUTPATIENT)
Dept: FAMILY MEDICINE | Facility: CLINIC | Age: 74
End: 2019-02-22

## 2019-02-22 NOTE — TELEPHONE ENCOUNTER
----- Message from Ilene Kan MD sent at 2/22/2019  8:00 AM CST -----  Cardiac ultrasound revealed mild to moderate leakage of one of the heart valves.  No further testing or evaluation needed at this time will monitor.  Please informme if you will have any time of dental procedures, because you will need prophylactic antibiotics.

## 2019-03-15 ENCOUNTER — OFFICE VISIT (OUTPATIENT)
Dept: RHEUMATOLOGY | Facility: CLINIC | Age: 74
End: 2019-03-15
Payer: MEDICARE

## 2019-03-15 VITALS
WEIGHT: 174.38 LBS | HEIGHT: 61 IN | DIASTOLIC BLOOD PRESSURE: 79 MMHG | SYSTOLIC BLOOD PRESSURE: 151 MMHG | HEART RATE: 75 BPM | BODY MASS INDEX: 32.92 KG/M2

## 2019-03-15 DIAGNOSIS — M70.61 TROCHANTERIC BURSITIS OF RIGHT HIP: Primary | ICD-10-CM

## 2019-03-15 PROCEDURE — 99499 UNLISTED E&M SERVICE: CPT | Mod: S$GLB,,, | Performed by: INTERNAL MEDICINE

## 2019-03-15 PROCEDURE — 20610 DRAIN/INJ JOINT/BURSA W/O US: CPT | Mod: RT,S$GLB,, | Performed by: INTERNAL MEDICINE

## 2019-03-15 PROCEDURE — 20610 LARGE JOINT ASPIRATION/INJECTION: R GREATER TROCHANTERIC BURSA: ICD-10-PCS | Mod: RT,S$GLB,, | Performed by: INTERNAL MEDICINE

## 2019-03-15 PROCEDURE — 99999 PR PBB SHADOW E&M-EST. PATIENT-LVL III: ICD-10-PCS | Mod: PBBFAC,,, | Performed by: INTERNAL MEDICINE

## 2019-03-15 PROCEDURE — 99999 PR PBB SHADOW E&M-EST. PATIENT-LVL III: CPT | Mod: PBBFAC,,, | Performed by: INTERNAL MEDICINE

## 2019-03-15 PROCEDURE — 99499 NO LOS: ICD-10-PCS | Mod: S$GLB,,, | Performed by: INTERNAL MEDICINE

## 2019-03-15 RX ORDER — BACLOFEN 10 MG/1
TABLET ORAL
COMMUNITY
Start: 2019-03-13 | End: 2021-01-08

## 2019-03-15 ASSESSMENT — ROUTINE ASSESSMENT OF PATIENT INDEX DATA (RAPID3)
FATIGUE SCORE: 7
PAIN SCORE: 8.5
TOTAL RAPID3 SCORE: 6.11
PSYCHOLOGICAL DISTRESS SCORE: 1.1
PATIENT GLOBAL ASSESSMENT SCORE: 8.5
MDHAQ FUNCTION SCORE: .4
AM STIFFNESS SCORE: 1, YES

## 2019-03-15 NOTE — PROCEDURES
Large Joint Aspiration/Injection: R greater trochanteric bursa  Date/Time: 3/15/2019 12:24 PM  Performed by: Woo Palacio MD  Authorized by: Woo Palacio MD     Consent Done?:  Yes (Verbal)  Indications:  Pain  Timeout: Prior to procedure the correct patient, procedure, and site was verified      Location:  Hip  Site:  R greater trochanteric bursa  Prep: Patient was prepped and draped in usual sterile fashion    Ultrasonic Guidance for needle placement: No  Needle size:  22 G  Approach:  Lateral  Aspirate amount (ml):  0  Patient tolerance:  Patient tolerated the procedure well with no immediate complications

## 2019-03-15 NOTE — PROGRESS NOTES
"Subjective:       Patient ID: Kristin Goodman is a 73 y.o. female.    Chief Complaint: Disease Management    HPI     Former patient of Dr Merchant with hx OA and recurrent trochanteric bursitis with previous injections    October back pain between bra and L iliac crest, mostly when twisted or picked up object; went to ER; mostly gone now unless turns the wrong way    She saw Dr Oates and me in October; went to PT and did exercises and that helped her    This week hip hurting a lot; R lateral hip   Previously has used Biofreeze and ES Tylenol    Review of Systems   Constitutional: Negative for fatigue, fever and unexpected weight change.   HENT: Negative for mouth sores and trouble swallowing.         Dry mouth   Eyes: Negative for redness.        Dry eyes   Respiratory: Negative for cough and shortness of breath.    Cardiovascular: Negative for chest pain.   Gastrointestinal: Negative for abdominal pain, constipation and diarrhea.   Skin: Negative for rash.   Neurological: Negative for headaches.   Hematological: Negative for adenopathy. Does not bruise/bleed easily.   Psychiatric/Behavioral: Negative for sleep disturbance.         Objective:   BP (!) 151/79   Pulse 75   Ht 5' 1" (1.549 m)   Wt 79.1 kg (174 lb 6.1 oz)   BMI 32.95 kg/m²      Physical Exam      Tender R hip at trochanter laterally; passive rotation not painful    Lab Results   Component Value Date    WBC 9.31 05/15/2018    HGB 12.4 05/15/2018    HCT 39.0 05/15/2018    MCV 91 05/15/2018     05/15/2018      Assessment:       1. Trochanteric bursitis of right hip            Plan:       Problem List Items Addressed This Visit     None      Visit Diagnoses     Trochanteric bursitis of right hip    -  Primary    Relevant Orders    Large Joint Aspiration/Injection: R greater trochanteric bursa            "

## 2019-03-19 ENCOUNTER — PES CALL (OUTPATIENT)
Dept: ADMINISTRATIVE | Facility: CLINIC | Age: 74
End: 2019-03-19

## 2019-03-25 ENCOUNTER — PES CALL (OUTPATIENT)
Dept: ADMINISTRATIVE | Facility: CLINIC | Age: 74
End: 2019-03-25

## 2019-04-11 ENCOUNTER — OFFICE VISIT (OUTPATIENT)
Dept: FAMILY MEDICINE | Facility: CLINIC | Age: 74
End: 2019-04-11
Payer: MEDICARE

## 2019-04-11 VITALS
BODY MASS INDEX: 32.13 KG/M2 | WEIGHT: 170.19 LBS | SYSTOLIC BLOOD PRESSURE: 142 MMHG | DIASTOLIC BLOOD PRESSURE: 84 MMHG | HEART RATE: 63 BPM | HEIGHT: 61 IN | OXYGEN SATURATION: 99 % | RESPIRATION RATE: 16 BRPM | TEMPERATURE: 98 F

## 2019-04-11 DIAGNOSIS — Z00.00 ENCOUNTER FOR PREVENTIVE HEALTH EXAMINATION: Primary | ICD-10-CM

## 2019-04-11 DIAGNOSIS — I34.0 MITRAL VALVE INSUFFICIENCY, UNSPECIFIED ETIOLOGY: ICD-10-CM

## 2019-04-11 DIAGNOSIS — E03.9 ACQUIRED HYPOTHYROIDISM: ICD-10-CM

## 2019-04-11 DIAGNOSIS — I10 ESSENTIAL HYPERTENSION: ICD-10-CM

## 2019-04-11 PROCEDURE — G0439 PR MEDICARE ANNUAL WELLNESS SUBSEQUENT VISIT: ICD-10-PCS | Mod: S$GLB,,, | Performed by: NURSE PRACTITIONER

## 2019-04-11 PROCEDURE — 99999 PR PBB SHADOW E&M-EST. PATIENT-LVL V: ICD-10-PCS | Mod: PBBFAC,,, | Performed by: NURSE PRACTITIONER

## 2019-04-11 PROCEDURE — 3077F PR MOST RECENT SYSTOLIC BLOOD PRESSURE >= 140 MM HG: ICD-10-PCS | Mod: CPTII,S$GLB,, | Performed by: NURSE PRACTITIONER

## 2019-04-11 PROCEDURE — 3079F DIAST BP 80-89 MM HG: CPT | Mod: CPTII,S$GLB,, | Performed by: NURSE PRACTITIONER

## 2019-04-11 PROCEDURE — 3079F PR MOST RECENT DIASTOLIC BLOOD PRESSURE 80-89 MM HG: ICD-10-PCS | Mod: CPTII,S$GLB,, | Performed by: NURSE PRACTITIONER

## 2019-04-11 PROCEDURE — 3077F SYST BP >= 140 MM HG: CPT | Mod: CPTII,S$GLB,, | Performed by: NURSE PRACTITIONER

## 2019-04-11 PROCEDURE — 99999 PR PBB SHADOW E&M-EST. PATIENT-LVL V: CPT | Mod: PBBFAC,,, | Performed by: NURSE PRACTITIONER

## 2019-04-11 PROCEDURE — G0439 PPPS, SUBSEQ VISIT: HCPCS | Mod: S$GLB,,, | Performed by: NURSE PRACTITIONER

## 2019-04-11 NOTE — PROGRESS NOTES
"Kristin Goodman presented for a  Medicare AWV and comprehensive Health Risk Assessment today. The following components were reviewed and updated:    · Medical history  · Family History  · Social history  · Allergies and Current Medications  · Health Risk Assessment  · Health Maintenance  · Care Team     ** See Completed Assessments for Annual Wellness Visit within the encounter summary.**       The following assessments were completed:  · Living Situation  · CAGE  · Depression Screening  · Timed Get Up and Go  · Whisper Test  · Cognitive Function Screening  · Nutrition Screening  · ADL Screening  · PAQ Screening    Vitals:    04/11/19 1224   BP: (!) 142/84   BP Location: Right arm   Patient Position: Sitting   BP Method: Medium (Manual)   Pulse: 63   Resp: 16   Temp: 97.7 °F (36.5 °C)   TempSrc: Oral   SpO2: 99%   Weight: 77.2 kg (170 lb 3.1 oz)   Height: 5' 1" (1.549 m)     Body mass index is 32.16 kg/m².  Physical Exam   Constitutional: She is oriented to person, place, and time.   Cardiovascular: Normal rate.   Pulmonary/Chest: Effort normal.   Musculoskeletal: Normal range of motion.   Neurological: She is alert and oriented to person, place, and time.   Skin: Skin is warm.   Psychiatric: She has a normal mood and affect. Her behavior is normal. Thought content normal.   Vitals reviewed.        Diagnoses and health risks identified today and associated recommendations/orders:    1. Encounter for preventive health examination  Education provided about preventive health examinations and procedures; addressed and discussed patient's health concerns. Additionally, reviewed medical record for risk factors and documented the results during this encounter.    2. Essential hypertension  Presently not at goal. We discussed health risks associated with this issue.  Patient aware of dietary and lifestyle modifications and medicinal interventions.   Ambulatory referral to Cardiology    3. Mitral valve insufficiency, " unspecified etiology  Patient expressed concerns about family history of cardiac disease, most recent cardiac tests; requesting to see a cardiologist for an evaluation and management.  Reassured patient about current cardiac status, will refer to cardiology as requested.   - Ambulatory referral to Cardiology    4. Acquired hypothyroidism  Clinically euthyroid, TSH 1.558 (February 2019) Stable and controlled. Continue current treatment plan as previously prescribed with your PCP.     Reviewed health maintenance, educated about recommended examinations, procedures (labs & images), and immunizations. Declined influenza at today's visit.     Reminded patient to review medical insurance benefits which may include access to fitness centers and health classes.   We discussed diet and exercise for weight loss.  Educated about diet, activities, and ways to avoid sedentary lifestyle.   We discussed aquatics, low impact exercising for stability, stamina, and strength.       Provided Kristin with a 5-10 year written screening schedule and personal prevention plan. Recommendations were developed using the USPSTF age appropriate recommendations. Education, counseling, and referrals were provided as needed. After Visit Summary printed and given to patient which includes a list of additional screenings\tests needed.    Follow up in about 1 year (around 4/11/2020) for assessment .    Thomas Hyman Jr, NP  I offered to discuss end of life issues, including information on how to make advance directives that the patient could use to name someone who would make medical decisions on their behalf if they became too ill to make themselves.    ___Patient declined  _X_Patient is interested, I provided paper work and offered to discuss.

## 2019-04-11 NOTE — PATIENT INSTRUCTIONS
Counseling and Referral of Other Preventative  (Italic type indicates deductible and co-insurance are waived)    Patient Name: Kristin Goodman  Today's Date: 4/11/2019    Health Maintenance       Date Due Completion Date    Influenza Vaccine 08/01/2018 9/15/2017 (Declined)    Override on 9/15/2017: Declined    Override on 2/19/2016: Declined    Override on 3/9/2015: Declined    Colonoscopy 06/04/2019 6/4/2012    Mammogram 10/27/2019 10/27/2017    DEXA SCAN 10/27/2020 10/27/2017    Lipid Panel 02/14/2024 2/14/2019    TETANUS VACCINE 08/16/2026 8/16/2016        No orders of the defined types were placed in this encounter.    The following information is provided to all patients.  This information is to help you find resources for any of the problems found today that may be affecting your health:                Living healthy guide: www.[facility].[place].gov      Understanding Diabetes: www.diabetes.org      Eating healthy: www.cdc.gov/healthyweight      [facility] home safety checklist: www.cdc.gov/steadi/patient.html      Agency on Aging: www.goea.[place].[facility]      Alcoholics anonymous (AA): www.aa.org      Physical Activity: www.kulwant.nih.gov/hq3vexw      Tobacco use: www.quitwithusla.org

## 2019-04-23 ENCOUNTER — OFFICE VISIT (OUTPATIENT)
Dept: CARDIOLOGY | Facility: CLINIC | Age: 74
End: 2019-04-23
Payer: MEDICARE

## 2019-04-23 VITALS
DIASTOLIC BLOOD PRESSURE: 77 MMHG | WEIGHT: 169.75 LBS | SYSTOLIC BLOOD PRESSURE: 191 MMHG | HEIGHT: 61 IN | HEART RATE: 77 BPM | BODY MASS INDEX: 32.05 KG/M2 | OXYGEN SATURATION: 99 %

## 2019-04-23 DIAGNOSIS — I10 HYPERTENSION, UNSPECIFIED TYPE: Primary | ICD-10-CM

## 2019-04-23 DIAGNOSIS — I10 HYPERTENSION: ICD-10-CM

## 2019-04-23 DIAGNOSIS — R06.09 DOE (DYSPNEA ON EXERTION): ICD-10-CM

## 2019-04-23 DIAGNOSIS — R07.9 CHEST PAIN, UNSPECIFIED TYPE: ICD-10-CM

## 2019-04-23 PROCEDURE — 93000 EKG 12-LEAD: ICD-10-PCS | Mod: S$GLB,,, | Performed by: INTERNAL MEDICINE

## 2019-04-23 PROCEDURE — 99204 PR OFFICE/OUTPT VISIT, NEW, LEVL IV, 45-59 MIN: ICD-10-PCS | Mod: S$GLB,,, | Performed by: INTERNAL MEDICINE

## 2019-04-23 PROCEDURE — 3078F PR MOST RECENT DIASTOLIC BLOOD PRESSURE < 80 MM HG: ICD-10-PCS | Mod: CPTII,S$GLB,, | Performed by: INTERNAL MEDICINE

## 2019-04-23 PROCEDURE — 3078F DIAST BP <80 MM HG: CPT | Mod: CPTII,S$GLB,, | Performed by: INTERNAL MEDICINE

## 2019-04-23 PROCEDURE — 1101F PR PT FALLS ASSESS DOC 0-1 FALLS W/OUT INJ PAST YR: ICD-10-PCS | Mod: CPTII,S$GLB,, | Performed by: INTERNAL MEDICINE

## 2019-04-23 PROCEDURE — 99999 PR PBB SHADOW E&M-EST. PATIENT-LVL III: CPT | Mod: PBBFAC,,, | Performed by: INTERNAL MEDICINE

## 2019-04-23 PROCEDURE — 3077F SYST BP >= 140 MM HG: CPT | Mod: CPTII,S$GLB,, | Performed by: INTERNAL MEDICINE

## 2019-04-23 PROCEDURE — 99204 OFFICE O/P NEW MOD 45 MIN: CPT | Mod: S$GLB,,, | Performed by: INTERNAL MEDICINE

## 2019-04-23 PROCEDURE — 1101F PT FALLS ASSESS-DOCD LE1/YR: CPT | Mod: CPTII,S$GLB,, | Performed by: INTERNAL MEDICINE

## 2019-04-23 PROCEDURE — 93000 ELECTROCARDIOGRAM COMPLETE: CPT | Mod: S$GLB,,, | Performed by: INTERNAL MEDICINE

## 2019-04-23 PROCEDURE — 99999 PR PBB SHADOW E&M-EST. PATIENT-LVL III: ICD-10-PCS | Mod: PBBFAC,,, | Performed by: INTERNAL MEDICINE

## 2019-04-23 PROCEDURE — 3077F PR MOST RECENT SYSTOLIC BLOOD PRESSURE >= 140 MM HG: ICD-10-PCS | Mod: CPTII,S$GLB,, | Performed by: INTERNAL MEDICINE

## 2019-04-23 RX ORDER — LISINOPRIL 40 MG/1
40 TABLET ORAL DAILY
Qty: 90 TABLET | Refills: 3 | Status: ON HOLD | OUTPATIENT
Start: 2019-04-23 | End: 2019-08-14 | Stop reason: SDUPTHER

## 2019-04-23 RX ORDER — ATORVASTATIN CALCIUM 40 MG/1
40 TABLET, FILM COATED ORAL NIGHTLY
Qty: 90 TABLET | Refills: 3 | Status: ON HOLD | OUTPATIENT
Start: 2019-04-23 | End: 2019-04-28 | Stop reason: HOSPADM
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
## 2019-04-23 NOTE — LETTER
April 23, 2019      Thomas Hyman Jr., NP  [address] [place]  [place] [address]           [facility] Cardiology  [address] [facility] [address]  [facility] [address]  Phone: [phone]          Patient: Kristin Goodman   MR Number: [id number]   YOB: 1945   Date of Visit: 4/23/2019       Dear Thomas Hyman Jr.:    Thank you for referring Kristin Goodman to me for evaluation. Attached you will find relevant portions of my assessment and plan of care.    If you have questions, please do not hesitate to call me. I look forward to following Kristin Goodman along with you.    Sincerely,    Cesar Mills MD    Enclosure  CC:  No Recipients    If you would like to receive this communication electronically, please contact [email or web address] or [phone] to request more information on [employer] Link access.    For providers and/or their staff who would like to refer a patient to [facility], please contact us through our one-stop-shop provider referral line, [facility] , at [phone].    If you feel you have received this communication in error or would no longer like to receive these types of communications, please e-mail [email or web address]

## 2019-04-27 ENCOUNTER — HOSPITAL ENCOUNTER (OUTPATIENT)
Facility: HOSPITAL | Age: 74
Discharge: HOME | End: 2019-04-28
Attending: EMERGENCY MEDICINE | Admitting: INTERNAL MEDICINE
Payer: MEDICARE

## 2019-04-27 DIAGNOSIS — M62.82 NON-TRAUMATIC RHABDOMYOLYSIS: ICD-10-CM

## 2019-04-27 DIAGNOSIS — M79.10 GENERALIZED MUSCLE ACHE: Primary | ICD-10-CM

## 2019-04-27 DIAGNOSIS — R07.9 CHEST PAIN: ICD-10-CM

## 2019-04-27 DIAGNOSIS — R74.8 ELEVATED CPK: Chronic | ICD-10-CM

## 2019-04-27 DIAGNOSIS — I34.0 NON-RHEUMATIC MITRAL REGURGITATION: ICD-10-CM

## 2019-04-27 DIAGNOSIS — E03.9 ACQUIRED HYPOTHYROIDISM: ICD-10-CM

## 2019-04-27 DIAGNOSIS — R53.1 WEAKNESS: ICD-10-CM

## 2019-04-27 DIAGNOSIS — I10 ESSENTIAL HYPERTENSION: ICD-10-CM

## 2019-04-27 DIAGNOSIS — N17.9 AKI (ACUTE KIDNEY INJURY): ICD-10-CM

## 2019-04-27 LAB
ALBUMIN SERPL BCP-MCNC: 3.5 G/DL (ref 3.5–5.2)
ALP SERPL-CCNC: 65 U/L (ref 55–135)
ALT SERPL W/O P-5'-P-CCNC: 24 U/L (ref 10–44)
ANION GAP SERPL CALC-SCNC: 10 MMOL/L (ref 8–16)
AST SERPL-CCNC: 27 U/L (ref 10–40)
BASOPHILS # BLD AUTO: 0.02 K/UL (ref 0–0.2)
BASOPHILS NFR BLD: 0.2 % (ref 0–1.9)
BILIRUB SERPL-MCNC: 0.3 MG/DL (ref 0.1–1)
BILIRUB UR QL STRIP: NEGATIVE
BUN SERPL-MCNC: 25 MG/DL (ref 8–23)
CALCIUM SERPL-MCNC: 9 MG/DL (ref 8.7–10.5)
CHLORIDE SERPL-SCNC: 103 MMOL/L (ref 95–110)
CK SERPL-CCNC: 274 U/L (ref 20–180)
CLARITY UR: CLEAR
CO2 SERPL-SCNC: 23 MMOL/L (ref 23–29)
COLOR UR: YELLOW
CREAT SERPL-MCNC: 1.5 MG/DL (ref 0.5–1.4)
DIFFERENTIAL METHOD: ABNORMAL
EOSINOPHIL # BLD AUTO: 0 K/UL (ref 0–0.5)
EOSINOPHIL NFR BLD: 0.3 % (ref 0–8)
ERYTHROCYTE [DISTWIDTH] IN BLOOD BY AUTOMATED COUNT: 14 % (ref 11.5–14.5)
EST. GFR  (AFRICAN AMERICAN): 40 ML/MIN/1.73 M^2
EST. GFR  (NON AFRICAN AMERICAN): 35 ML/MIN/1.73 M^2
GLUCOSE SERPL-MCNC: 98 MG/DL (ref 70–110)
GLUCOSE UR QL STRIP: NEGATIVE
HCT VFR BLD AUTO: 39.2 % (ref 37–48.5)
HGB BLD-MCNC: 12.3 G/DL (ref 12–16)
HGB UR QL STRIP: NEGATIVE
KETONES UR QL STRIP: ABNORMAL
LEUKOCYTE ESTERASE UR QL STRIP: ABNORMAL
LYMPHOCYTES # BLD AUTO: 1.8 K/UL (ref 1–4.8)
LYMPHOCYTES NFR BLD: 13.2 % (ref 18–48)
MAGNESIUM SERPL-MCNC: 2 MG/DL (ref 1.6–2.6)
MCH RBC QN AUTO: 29.1 PG (ref 27–31)
MCHC RBC AUTO-ENTMCNC: 31.4 G/DL (ref 32–36)
MCV RBC AUTO: 93 FL (ref 82–98)
MICROSCOPIC COMMENT: NORMAL
MONOCYTES # BLD AUTO: 1.3 K/UL (ref 0.3–1)
MONOCYTES NFR BLD: 10.1 % (ref 4–15)
NEUTROPHILS # BLD AUTO: 10.1 K/UL (ref 1.8–7.7)
NEUTROPHILS NFR BLD: 76.2 % (ref 38–73)
NITRITE UR QL STRIP: NEGATIVE
PH UR STRIP: 5 [PH] (ref 5–8)
PLATELET # BLD AUTO: 385 K/UL (ref 150–350)
PMV BLD AUTO: 9.5 FL (ref 9.2–12.9)
POTASSIUM SERPL-SCNC: 3.9 MMOL/L (ref 3.5–5.1)
PROT SERPL-MCNC: 7.5 G/DL (ref 6–8.4)
PROT UR QL STRIP: NEGATIVE
RBC # BLD AUTO: 4.23 M/UL (ref 4–5.4)
RBC #/AREA URNS HPF: 1 /HPF (ref 0–4)
SODIUM SERPL-SCNC: 136 MMOL/L (ref 136–145)
SP GR UR STRIP: 1.01 (ref 1–1.03)
TROPONIN I SERPL DL<=0.01 NG/ML-MCNC: 0.02 NG/ML (ref 0–0.03)
TROPONIN I SERPL DL<=0.01 NG/ML-MCNC: 0.03 NG/ML (ref 0–0.03)
TSH SERPL DL<=0.005 MIU/L-ACNC: 0.72 UIU/ML (ref 0.4–4)
URN SPEC COLLECT METH UR: ABNORMAL
UROBILINOGEN UR STRIP-ACNC: NEGATIVE EU/DL
WBC # BLD AUTO: 13.23 K/UL (ref 3.9–12.7)
WBC #/AREA URNS HPF: 3 /HPF (ref 0–5)

## 2019-04-27 PROCEDURE — 93005 ELECTROCARDIOGRAM TRACING: CPT

## 2019-04-27 PROCEDURE — 99285 EMERGENCY DEPT VISIT HI MDM: CPT

## 2019-04-27 PROCEDURE — 84443 ASSAY THYROID STIM HORMONE: CPT

## 2019-04-27 PROCEDURE — 80053 COMPREHEN METABOLIC PANEL: CPT

## 2019-04-27 PROCEDURE — 81000 URINALYSIS NONAUTO W/SCOPE: CPT

## 2019-04-27 PROCEDURE — 83735 ASSAY OF MAGNESIUM: CPT

## 2019-04-27 PROCEDURE — 25000003 PHARM REV CODE 250: Performed by: NURSE PRACTITIONER

## 2019-04-27 PROCEDURE — 84484 ASSAY OF TROPONIN QUANT: CPT

## 2019-04-27 PROCEDURE — 25000003 PHARM REV CODE 250: Performed by: EMERGENCY MEDICINE

## 2019-04-27 PROCEDURE — 93010 ELECTROCARDIOGRAM REPORT: CPT | Mod: ,,, | Performed by: INTERNAL MEDICINE

## 2019-04-27 PROCEDURE — 36415 COLL VENOUS BLD VENIPUNCTURE: CPT

## 2019-04-27 PROCEDURE — G0378 HOSPITAL OBSERVATION PER HR: HCPCS

## 2019-04-27 PROCEDURE — 93010 EKG 12-LEAD: ICD-10-PCS | Mod: ,,, | Performed by: INTERNAL MEDICINE

## 2019-04-27 PROCEDURE — 85025 COMPLETE CBC W/AUTO DIFF WBC: CPT

## 2019-04-27 PROCEDURE — 96361 HYDRATE IV INFUSION ADD-ON: CPT | Performed by: EMERGENCY MEDICINE

## 2019-04-27 PROCEDURE — 82550 ASSAY OF CK (CPK): CPT

## 2019-04-27 RX ORDER — AMLODIPINE BESYLATE 5 MG/1
10 TABLET ORAL DAILY
Status: DISCONTINUED | OUTPATIENT
Start: 2019-04-28 | End: 2019-04-28 | Stop reason: HOSPADM

## 2019-04-27 RX ORDER — ONDANSETRON 2 MG/ML
4 INJECTION INTRAMUSCULAR; INTRAVENOUS EVERY 8 HOURS PRN
Status: DISCONTINUED | OUTPATIENT
Start: 2019-04-27 | End: 2019-04-28 | Stop reason: HOSPADM

## 2019-04-27 RX ORDER — METOPROLOL SUCCINATE 50 MG/1
75 TABLET, EXTENDED RELEASE ORAL DAILY
COMMUNITY
End: 2019-04-30 | Stop reason: SDUPTHER

## 2019-04-27 RX ORDER — ACETAMINOPHEN 325 MG/1
650 TABLET ORAL EVERY 6 HOURS PRN
Status: DISCONTINUED | OUTPATIENT
Start: 2019-04-27 | End: 2019-04-28 | Stop reason: HOSPADM

## 2019-04-27 RX ORDER — NITROGLYCERIN 0.4 MG/1
0.4 TABLET SUBLINGUAL EVERY 5 MIN PRN
Status: DISCONTINUED | OUTPATIENT
Start: 2019-04-27 | End: 2019-04-28 | Stop reason: HOSPADM

## 2019-04-27 RX ORDER — ASPIRIN 325 MG
325 TABLET ORAL DAILY
Status: DISCONTINUED | OUTPATIENT
Start: 2019-04-28 | End: 2019-04-28 | Stop reason: HOSPADM

## 2019-04-27 RX ORDER — SODIUM CHLORIDE 0.9 % (FLUSH) 0.9 %
10 SYRINGE (ML) INJECTION
Status: DISCONTINUED | OUTPATIENT
Start: 2019-04-27 | End: 2019-04-28 | Stop reason: HOSPADM

## 2019-04-27 RX ORDER — LEVOTHYROXINE SODIUM 25 UG/1
25 TABLET ORAL DAILY
Status: DISCONTINUED | OUTPATIENT
Start: 2019-04-28 | End: 2019-04-28 | Stop reason: HOSPADM

## 2019-04-27 RX ORDER — LISINOPRIL 20 MG/1
40 TABLET ORAL DAILY
Status: DISCONTINUED | OUTPATIENT
Start: 2019-04-28 | End: 2019-04-27

## 2019-04-27 RX ORDER — SODIUM CHLORIDE 9 MG/ML
INJECTION, SOLUTION INTRAVENOUS CONTINUOUS
Status: ACTIVE | OUTPATIENT
Start: 2019-04-27 | End: 2019-04-28

## 2019-04-27 RX ORDER — ATORVASTATIN CALCIUM 40 MG/1
40 TABLET, FILM COATED ORAL NIGHTLY
Status: DISCONTINUED | OUTPATIENT
Start: 2019-04-27 | End: 2019-04-28

## 2019-04-27 RX ORDER — ASPIRIN 325 MG
325 TABLET ORAL
Status: COMPLETED | OUTPATIENT
Start: 2019-04-27 | End: 2019-04-27

## 2019-04-27 RX ADMIN — ATORVASTATIN CALCIUM 40 MG: 40 TABLET, FILM COATED ORAL at 08:04

## 2019-04-27 RX ADMIN — SODIUM CHLORIDE: 0.9 INJECTION, SOLUTION INTRAVENOUS at 08:04

## 2019-04-27 RX ADMIN — ASPIRIN 325 MG ORAL TABLET 325 MG: 325 PILL ORAL at 06:04

## 2019-04-27 NOTE — ED TRIAGE NOTES
74 y.o. Female presents to the ED with chief complaint of leg cramps and generalized weakness. Patient reports recently seeing her cardiology and having some of her BP medications changed and since then, has been feeling generally weak with associated bilateral LE cramps since yesterday. Patient denies CP. Patient resting in bed in NAD. Side rails up x2.
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
## 2019-04-28 VITALS
DIASTOLIC BLOOD PRESSURE: 57 MMHG | BODY MASS INDEX: 31.84 KG/M2 | WEIGHT: 168.63 LBS | OXYGEN SATURATION: 96 % | HEART RATE: 80 BPM | SYSTOLIC BLOOD PRESSURE: 117 MMHG | RESPIRATION RATE: 18 BRPM | HEIGHT: 61 IN | TEMPERATURE: 98 F

## 2019-04-28 LAB
ALBUMIN SERPL BCP-MCNC: 3.1 G/DL (ref 3.5–5.2)
ALBUMIN SERPL BCP-MCNC: 3.1 G/DL (ref 3.5–5.2)
ALP SERPL-CCNC: 62 U/L (ref 55–135)
ALT SERPL W/O P-5'-P-CCNC: 21 U/L (ref 10–44)
ANION GAP SERPL CALC-SCNC: 8 MMOL/L (ref 8–16)
ANION GAP SERPL CALC-SCNC: 8 MMOL/L (ref 8–16)
AST SERPL-CCNC: 33 U/L (ref 10–40)
BILIRUB SERPL-MCNC: 0.4 MG/DL (ref 0.1–1)
BUN SERPL-MCNC: 18 MG/DL (ref 8–23)
BUN SERPL-MCNC: 18 MG/DL (ref 8–23)
CALCIUM SERPL-MCNC: 8.8 MG/DL (ref 8.7–10.5)
CALCIUM SERPL-MCNC: 8.8 MG/DL (ref 8.7–10.5)
CHLORIDE SERPL-SCNC: 108 MMOL/L (ref 95–110)
CHLORIDE SERPL-SCNC: 108 MMOL/L (ref 95–110)
CHOLEST SERPL-MCNC: 104 MG/DL (ref 120–199)
CHOLEST/HDLC SERPL: 2.8 {RATIO} (ref 2–5)
CK SERPL-CCNC: 484 U/L (ref 20–180)
CK SERPL-CCNC: 486 U/L (ref 20–180)
CO2 SERPL-SCNC: 25 MMOL/L (ref 23–29)
CO2 SERPL-SCNC: 25 MMOL/L (ref 23–29)
CREAT SERPL-MCNC: 0.9 MG/DL (ref 0.5–1.4)
CREAT SERPL-MCNC: 0.9 MG/DL (ref 0.5–1.4)
EST. GFR  (AFRICAN AMERICAN): >60 ML/MIN/1.73 M^2
EST. GFR  (AFRICAN AMERICAN): >60 ML/MIN/1.73 M^2
EST. GFR  (NON AFRICAN AMERICAN): >60 ML/MIN/1.73 M^2
EST. GFR  (NON AFRICAN AMERICAN): >60 ML/MIN/1.73 M^2
GLUCOSE SERPL-MCNC: 88 MG/DL (ref 70–110)
GLUCOSE SERPL-MCNC: 88 MG/DL (ref 70–110)
HDLC SERPL-MCNC: 37 MG/DL (ref 40–75)
HDLC SERPL: 35.6 % (ref 20–50)
LDLC SERPL CALC-MCNC: 43.2 MG/DL (ref 63–159)
NONHDLC SERPL-MCNC: 67 MG/DL
PHOSPHATE SERPL-MCNC: 3.4 MG/DL (ref 2.7–4.5)
POTASSIUM SERPL-SCNC: 4 MMOL/L (ref 3.5–5.1)
POTASSIUM SERPL-SCNC: 4 MMOL/L (ref 3.5–5.1)
PROT SERPL-MCNC: 6.9 G/DL (ref 6–8.4)
SODIUM SERPL-SCNC: 141 MMOL/L (ref 136–145)
SODIUM SERPL-SCNC: 141 MMOL/L (ref 136–145)
TRIGL SERPL-MCNC: 119 MG/DL (ref 30–150)
TROPONIN I SERPL DL<=0.01 NG/ML-MCNC: 0.02 NG/ML (ref 0–0.03)
TROPONIN I SERPL DL<=0.01 NG/ML-MCNC: 0.02 NG/ML (ref 0–0.03)

## 2019-04-28 PROCEDURE — 82550 ASSAY OF CK (CPK): CPT | Mod: 91

## 2019-04-28 PROCEDURE — G0378 HOSPITAL OBSERVATION PER HR: HCPCS

## 2019-04-28 PROCEDURE — 25000003 PHARM REV CODE 250: Performed by: NURSE PRACTITIONER

## 2019-04-28 PROCEDURE — 36415 COLL VENOUS BLD VENIPUNCTURE: CPT

## 2019-04-28 PROCEDURE — 80053 COMPREHEN METABOLIC PANEL: CPT

## 2019-04-28 PROCEDURE — 99220 PR INITIAL OBSERVATION CARE,LEVL III: ICD-10-PCS | Mod: ,,, | Performed by: INTERNAL MEDICINE

## 2019-04-28 PROCEDURE — 84484 ASSAY OF TROPONIN QUANT: CPT | Mod: 91

## 2019-04-28 PROCEDURE — 84100 ASSAY OF PHOSPHORUS: CPT

## 2019-04-28 PROCEDURE — 25000003 PHARM REV CODE 250: Performed by: EMERGENCY MEDICINE

## 2019-04-28 PROCEDURE — 96360 HYDRATION IV INFUSION INIT: CPT | Performed by: EMERGENCY MEDICINE

## 2019-04-28 PROCEDURE — 82550 ASSAY OF CK (CPK): CPT

## 2019-04-28 PROCEDURE — 96361 HYDRATE IV INFUSION ADD-ON: CPT | Performed by: EMERGENCY MEDICINE

## 2019-04-28 PROCEDURE — 80061 LIPID PANEL: CPT

## 2019-04-28 PROCEDURE — 94761 N-INVAS EAR/PLS OXIMETRY MLT: CPT

## 2019-04-28 PROCEDURE — 99220 PR INITIAL OBSERVATION CARE,LEVL III: CPT | Mod: ,,, | Performed by: INTERNAL MEDICINE

## 2019-04-28 RX ORDER — SODIUM CHLORIDE 9 MG/ML
INJECTION, SOLUTION INTRAVENOUS ONCE
Status: COMPLETED | OUTPATIENT
Start: 2019-04-28 | End: 2019-04-28

## 2019-04-28 RX ORDER — ENOXAPARIN SODIUM 100 MG/ML
40 INJECTION SUBCUTANEOUS EVERY 24 HOURS
Status: DISCONTINUED | OUTPATIENT
Start: 2019-04-28 | End: 2019-04-28 | Stop reason: HOSPADM

## 2019-04-28 RX ADMIN — LEVOTHYROXINE SODIUM 25 MCG: 25 TABLET ORAL at 05:04

## 2019-04-28 RX ADMIN — ASPIRIN 325 MG ORAL TABLET 325 MG: 325 PILL ORAL at 08:04

## 2019-04-28 RX ADMIN — NEPHROCAP 1 CAPSULE: 1 CAP ORAL at 08:04

## 2019-04-28 RX ADMIN — SODIUM CHLORIDE: 0.9 INJECTION, SOLUTION INTRAVENOUS at 09:04

## 2019-04-28 RX ADMIN — AMLODIPINE BESYLATE 10 MG: 5 TABLET ORAL at 08:04

## 2019-04-28 RX ADMIN — METOPROLOL SUCCINATE 75 MG: 25 TABLET, EXTENDED RELEASE ORAL at 08:04

## 2019-04-28 NOTE — DISCHARGE INSTRUCTIONS
Obtain labs in the early morning prior to your appointment with Dr. Mills  Complete medications as ordered  Follow all discharge instructions.  Please schedule follow up appointments as necessary      When to Call Your Doctor  Call your doctor immediately if you have any of the following:  · Severe headache  · Severe dizziness, or fainting  · Nausea or vomiting  · Fast heartbeat (higher than 100 beats per minute)  · Fever or chills  · Swollen ankles  · Weakness  · Chest Pain attacks that last longer, occur more often, or are more severe than in the past

## 2019-04-28 NOTE — PROGRESS NOTES
WRITTEN DISCHARGE INSTRUCTIONS    Follow-Up Appointment: Cardiology Hospital Follow-Up scheduled for you with Dr. Mills; unable to schedule PCP appointment.  Follow-up Information     Ilene Kan MD. Schedule an appointment as soon as possible for a visit in 1 week.    Specialty:  Family Medicine  Why:  Call the office to schedule appointment, For outpatient follow-up/post hospitalizaton  Contact information:  [address]  [place] [address]  [phone]             Cesar Mills MD. Go on 4/30/2019.    Specialties:  Cardiology, INTERVENTIONAL CARDIOLOGY  Why:  11:00am Cardiology [facility] Follow-Up appointment scheduled with Dr. Mills  Contact information:  [address]  [address]  [place] [address]  [phone]               Healthy Living Instructions to HELP YOU MANAGE YOUR CARE AT HOME:  Things that YOU are responsible for:  1. Getting your prescriptions filled  2. Taking your prescriptions as directed. DO NOT MISS ANY DOSES!  3. Following the diet and exercise recommended by your doctor  Going to your follow-up doctor appointments. This is important because it allows the doctor to monitor your progress and determine if any changes need to be made to your treatment plan.
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
## 2019-04-28 NOTE — HPI
"75 yo AAF with significant history for hypothyroidism, hypertension, diabetes type 2, DDD/right radiculopathy, history of right hip bursitis status post steroid injection 2016 and hysterectomy who presented to the hospital of intermittent cramps in bilateral legs/hand and left-sided chest cramps "sticking pin" that started early 4 a.m. Subsided after 1 sec without intervention. Patient with similar sympoms in the past in 2016 and continues to have intermitted left chest wall "sticking pin" pain occurring almost daily for about 1 year or more. Patient then went to a  and again experiencing symptoms when returning home but grader intensity with shortness of breath, nausea, vomiting, and diaphoresis. Patient also noted blood pressure at 4 a.m. 165/90 and heart rate 109.  Denies smoking or drinking alcohol.  Denies use of illicit drugs.  Patient reports cut down on salt intake.  Patient drinks about 2 and half bottle water a day.     Patient recently referred to cardiologist Dr. Mills for mild to moderate mitral regurgitation (2019) felt likely related to uncontrolled hypertension. Patient started also on lisinopril and Lipitor at that time.  Also plan for renal ultrasound to rule out renal artery stenosis and if worsening dyspnea on exertion or atypical chest pain, plan for nuclear stress test.  2019 2D echo showed normal EF 55%, normal diastolic function, mild-to-moderate mitral regurgitation, and wall motion normal.   In ED, EKG normal sinus rhythm heart rate 88.  WBC 13 K. creatinine 1.5 up from baseline 0.9-1.0.  .  Troponin 0.025.  TSH 0.720.  UA no evidence infection but does show trace ketone and leukocyte.  Chest x-ray clear.    Pt follows with Dr. Mills.  Seen 19 in office with plans for outpat stress test and renal art US.    The patient presents from [facility] with symptoms of near-syncope and stabbing chest discomfort of a circumscribed nature.  She does not describe typical " angina and did not actually lose consciousness.  She has had no palpitations or shortness of breath.  She denies any PND, orthopnea, or lower extremity edema.  There has been no melena, hematuria, or claudicant symptoms.  She recently had her statin changed to atorvastatin and CPKs noted to rise up to the mid 400s.  Her troponin is negative x2.

## 2019-04-28 NOTE — ASSESSMENT & PLAN NOTE
Patient with history of mild elevated CPK in the past.  LFTs okay and do not think due to statin.  Possibly due to mild dehydration given WILFREDO.  IV fluid overnight and repeat CPK in a.m..
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
## 2019-04-28 NOTE — DISCHARGE SUMMARY
"[facility] Hospital Medicine  Discharge Summary      Patient Name: Kristin Goodman  MRN: [id number]  Admission Date: 2019  Hospital Length of Stay: 0 days  Discharge Date and Time:  2019 10:59 AM  Attending Physician: Heydi Cortes MD   Discharging Provider: SVITLANA Mcclure  Primary Care Provider: Ilene Kan MD      HPI:   Mrs. Goodman is a 75 yo AAF with significant history for hypothyroidism, hypertension, diabetes type 2, DDD/right radiculopathy, history of right hip bursitis status post steroid injection 2016 and hysterectomy who presented to the hospital of intermittent cramps in bilateral legs/hand and left-sided chest cramps "sticking pin" that started early 4 a.m. Subsided after 1 sec without intervention. Patient with similar sympoms in the past in 2016 and continues to have intermitted left chest wall "sticking pin" pain occurring almost daily for about 1 year or more. Patient then went to a  and again experiencing symptoms when returning home but grader intensity with shortness of breath, nausea, vomiting, and diaphoresis. Patient also noted blood pressure at 4 a.m. 165/90 and heart rate 109.  Denies smoking or drinking alcohol.  Denies use of illicit drugs.  Patient reports cut down on salt intake.  Patient drinks about 2 and half bottle water a day.    Patient recently referred to cardiologist Dr. Mills for mild to moderate mitral regurgitation (2019) felt likely related to uncontrolled hypertension. Patient started also on lisinopril and Lipitor at that time.  Also plan for renal ultrasound to rule out renal artery stenosis and if worsening dyspnea on exertion or atypical chest pain, plan for nuclear stress test.    2019 2D echo showed normal EF 55%, normal diastolic function, mild-to-moderate mitral regurgitation, and wall motion normal.     In ED, EKG normal sinus rhythm heart rate 88.  WBC 13 K. creatinine 1.5 up from baseline " 0.9-1.0.  .  Troponin 0.25.  TSH 0.720.  UA no evidence infection but does show trace ketone and leukocyte.  Chest x-ray clear.    * No surgery found *      Hospital Course:   Patient placed in observation for evaluation of 2 day history of worsening muscle cramping and aching - found to have elevated CPK with serial negative troponin 1 levels. She denied CP and was seen and evaluated by cardiology who did not recommend any further testing. She reported that she was recently started on lipitor about 1 week ago. Lipitor was discontinued due to know propensity to induce rhabdos in select patients. Her CPK peaked at 484; renal functions better as she was noted also to have WILFREDO which normalized with IV fluids, her creatine now improved 1.5-->0.9. She was instructed to discontinue lipitor until seen by cardiology in clinic. Started on Omega for now for extra cholesterol support. Her cholesterol at this time shows decent control with overall cholesterol = 104/ HDL=37, & LDL=43.2. She has no new complaints and will discharge to home. Vitals grossly stable - follow up in clinic with Cardiology. Repeat BMP and CPK in 2 days.     Consults:   Consults (From admission, onward)        Status Ordering Provider     Inpatient consult to Cardiology  Once     Provider:  Dieudonne Barr MD    Completed CATRACHO ROSA            Final Active Diagnoses:    Diagnosis Date Noted POA    PRINCIPAL PROBLEM:  Chest pain [R07.9] 04/27/2019 Yes    WILFREDO (acute kidney injury) [N17.9] 04/27/2019 Yes    Elevated CPK [R74.8] 04/27/2019 Yes     Chronic    Mitral valve regurgitation [I34.0] 04/11/2019 Yes    Hypothyroid [E03.9]  Yes    HTN (hypertension) [I10]  Yes      Problems Resolved During this Admission:       Discharged Condition: stable    Disposition: Home or Self Care    Follow Up:  Follow-up Information     Ilene Kan MD. Schedule an appointment as soon as possible for a visit in 1 week.    Specialty:  Family  Medicine  Why:  Call the office to schedule appointment, For outpatient follow-up/post hospitalizaton  Contact information:  [address] [place] [address]  [phone]             Cesar Mills MD. Go on 4/30/2019.    Specialties:  Cardiology, INTERVENTIONAL CARDIOLOGY  Why:  11:00am Cardiology Hospital Follow-Up appointment scheduled with Dr. Mills  Contact information:  [address]  [address]  [place] [id number] [phone]                 Patient Instructions:      BASIC METABOLIC PANEL   Standing Status: Future Standing Exp. Date: 06/26/20   Order Comments: Report results to Dr. Mills; [address] [facility], [address], [place] [address], [phone]     CK   Standing Status: Future Standing Exp. Date: 06/26/20   Order Comments: Report results to Dr. Mills; [address] [facility] [address], [place] [address], [phone]     Diet Cardiac     Activity as tolerated       Significant Diagnostic Studies: Labs:   CMP   Recent Labs   Lab 04/27/19  1625 04/28/19  0452    141  141   K 3.9 4.0  4.0    108  108   CO2 23 25  25   GLU 98 88  88   BUN 25* 18  18   CREATININE 1.5* 0.9  0.9   CALCIUM 9.0 8.8  8.8   PROT 7.5 6.9   ALBUMIN 3.5 3.1*  3.1*   BILITOT 0.3 0.4   ALKPHOS 65 62   AST 27 33   ALT 24 21   ANIONGAP 10 8  8   ESTGFRAFRICA 40* >60  >60   EGFRNONAA 35* >60  >60   , CBC   Recent Labs   Lab 04/27/19  1625   WBC 13.23*   HGB 12.3   HCT 39.2   *   , INR No results found for: INR, PROTIME, Lipid Panel   Lab Results   Component Value Date    CHOL 104 (L) 04/28/2019    HDL 37 (L) 04/28/2019    LDLCALC 43.2 (L) 04/28/2019    TRIG 119 04/28/2019    CHOLHDL 35.6 04/28/2019   , Troponin   Recent Labs   Lab 04/28/19  0452   TROPONINI 0.023    and A1C:   Recent Labs   Lab 02/14/19  0952   HGBA1C 5.7*         Medications:  Reconciled Home Medications:      Medication List      START taking these medications    fish,bora,flax oils-om3,6,9no1 1,200 mg Cap  Commonly known as:  OMEGA 3-6-9  Take 1  each by mouth once daily.        CONTINUE taking these medications    amLODIPine 10 MG tablet  Commonly known as:  NORVASC  Take 1 tablet (10 mg total) by mouth once daily.     aspirin 81 MG Chew  Take 81 mg by mouth once daily.     baclofen 10 MG tablet  Commonly known as:  LIORESAL     epinastine 0.05 % ophthalmic solution     levothyroxine 25 MCG tablet  Commonly known as:  SYNTHROID  Take 1 tablet (25 mcg total) by mouth once daily.     lisinopril 40 MG tablet  Commonly known as:  PRINIVIL,ZESTRIL  Take 1 tablet (40 mg total) by mouth once daily.     metoprolol succinate 50 MG 24 hr tablet  Commonly known as:  TOPROL-XL  Take 75 mg by mouth once daily.     TRIPHROCAPS 1 mg Cap  Generic drug:  vitamin renal formula (B-complex-vitamin c-folic acid)  TAKE 1 CAPSULE BY MOUTH ONCE DAILY     TYLENOL ARTHRITIS PAIN ORAL  Take 1 tablet by mouth once daily.        STOP taking these medications    atorvastatin 40 MG tablet  Commonly known as:  LIPITOR            Indwelling Lines/Drains at time of discharge:   Lines/Drains/Airways          None          Time spent on the discharge of patient: 35 minutes  Patient was seen and examined on the date of discharge and determined to be suitable for discharge.         SLIME Spears, FNP-C  Hospitalist - Department of Hospital Medicine  [address] [place] [address]  Office [phone]; Pager [phone]
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
## 2019-04-28 NOTE — NURSING
Report rec'd from CRISTINE Carrero in ED. Patient AAOx4, ambulates steady. Dx: CP. C/o leg cramps and gen weakness x 2 days. Awaiting arrival to [facility].

## 2019-04-28 NOTE — NURSING
Evaluated general patient appearance and condition. Patient AAOx4. No apparent distress noted.  No complaints of pain.

## 2019-04-28 NOTE — ASSESSMENT & PLAN NOTE
Currently controlled. Continue home metoprolol, amlodipine. Hold lisinopril due to WILFREDO. Resume lisinopril once WILFREDO resolves.

## 2019-04-28 NOTE — ASSESSMENT & PLAN NOTE
Baseline creatinine 0.9-1.0.  Serum creatinine 1.5 on admit.  Urine shows trace ketone.  Pre renal.  UA no protein.  IV fluid overnight and repeat renal function panel in a.m. if no improvement will order additional urine studies and renal ultrasound.

## 2019-04-28 NOTE — HPI
"Mrs. Goodman is a 73 yo AAF with significant history for hypothyroidism, hypertension, diabetes type 2, DDD/right radiculopathy, history of right hip bursitis status post steroid injection 2016 and hysterectomy who presented to the hospital of intermittent cramps in bilateral legs/hand and left-sided chest cramps "sticking pin" that started early 4 a.m. Subsided after 1 sec without intervention. Patient with similar sympoms in the past in 2016 and continues to have intermitted left chest wall "sticking pin" pain occurring almost daily for about 1 year or more. Patient then went to a  and again experiencing symptoms when returning home but grader intensity with shortness of breath, nausea, vomiting, and diaphoresis. Patient also noted blood pressure at 4 a.m. 165/90 and heart rate 109.  Denies smoking or drinking alcohol.  Denies use of illicit drugs.  Patient reports cut down on salt intake.  Patient drinks about 2 and half bottle water a day.    Patient recently referred to cardiologist Dr. Mills for mild to moderate mitral regurgitation (2019) felt likely related to uncontrolled hypertension. Patient started also on lisinopril and Lipitor at that time.  Also plan for renal ultrasound to rule out renal artery stenosis and if worsening dyspnea on exertion or atypical chest pain, plan for nuclear stress test.    2019 2D echo showed normal EF 55%, normal diastolic function, mild-to-moderate mitral regurgitation, and wall motion normal.     In ED, EKG normal sinus rhythm heart rate 88.  WBC 13 K. creatinine 1.5 up from baseline 0.9-1.0.  .  Troponin 0.25.  TSH 0.720.  UA no evidence infection but does show trace ketone and leukocyte.  Chest x-ray clear.  "

## 2019-04-28 NOTE — ASSESSMENT & PLAN NOTE
02/19/2019 2D echo showed normal EF 55%, normal diastolic function, mild-to-moderate mitral regurgitation, and wall motion normal.   Saw Dr. Mills on 4/23/19, recommend blood pressure control and repeat 2 D echo.

## 2019-04-28 NOTE — PLAN OF CARE
04/28/19 1115   Discharge Assessment   Assessment Type Discharge Planning Assessment   Confirmed/corrected address and phone number on facesheet? Yes   Assessment information obtained from? Patient   Communicated expected length of stay with patient/caregiver yes   Prior to hospitilization cognitive status: Alert/Oriented   Prior to hospitalization functional status: Independent   Current cognitive status: Alert/Oriented   Current Functional Status: Independent   Facility Arrived From: Home   Lives With child(arthur), adult   Patient's perception of discharge disposition home or selfcare   Readmission Within the Last 30 Days no previous admission in last 30 days   Patient currently being followed by outpatient case management? No   Patient currently receives any other outside agency services? No   Equipment Currently Used at Home none   Do you have any problems affording any of your prescribed medications? No   Does the patient have transportation home? Yes   Transportation Anticipated public transportation   Does the patient receive services at the [facility]? No   Discharge Plan A Home with family   Discharge Plan B Other  (TBD)   DME Needed Upon Discharge  other (see comments)  (TBD)   Patient/Family in Agreement with Plan yes   SW Role explained to patient; two patient identifiers recognized; SW contact information placed on Communication board. Discussed patient managing health care at home; determined who would be helping patient at home with recovery: Roro-daughter will help with recovery at home    PCP: Ilene Kan MD  Prefers morning appointments    Extended Emergency Contact Information  Primary Emergency Contact: Roro Goodman  Address: [address]           [place] [address] [place]  Home Phone: [phone]  Mobile Phone: [phone]  Relation: Daughter     [facility] - [place], [place] - [address]  [address] [place] [address]  Phone:  [phone] Fax: [phone]    Payor: [employer] [facility] MEDICARE / Plan: [employer] [facility] HEALTH / Product Type: Medicare Advantage /

## 2019-04-28 NOTE — ASSESSMENT & PLAN NOTE
Chest pain sound atypical and similar episodes in the past but never had shortness of breath, NV, and diaphoresis. Currently, patient is chest pain free and denies shortness of breath.   02/19/2019 2D echo showed normal EF 55%, normal diastolic function, mild-to-moderate mitral regurgitation, and wall motion normal.   Saw Cardiologist Dr. Mills few days ago on 4/23/19 and started on lisinopril and lipitor at that time. Plan of NST next week.   EKG normal sinus rhythm heart rate 88. First troponin negative.   Plan to trend troponin, continue ASA, BB, statin (pateint with mild elevated CPK in the past). If no further chest pain and rule out for ACS, then okay outpatient stress test.
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
## 2019-04-28 NOTE — PLAN OF CARE
04/28/19 1118   Post-Acute Status   Post-Acute Authorization   (Home with Cardiology Follow-up with Dr. Mills)   Discharge Delays None known at this time

## 2019-04-28 NOTE — PLAN OF CARE
04/28/19 1117   Final Note   Assessment Type Final Discharge Note   Anticipated Discharge Disposition Home   What phone number can be called within the next 1-3 days to see how you are doing after discharge?   ([phone])   Hospital Follow Up  Appt(s) scheduled? Yes  (Cardiology)   Discharge plans and expectations educations in teach back method with documentation complete? Yes   Right Care Referral Info   Post Acute Recommendation No Care

## 2019-04-28 NOTE — HOSPITAL COURSE
Patient placed in observation for evaluation of 2 day history of worsening muscle cramping and aching - found to have elevated CPK with serial negative troponin 1 levels. She denied CP and was seen and evaluated by cardiology who did not recommend any further testing. She reported that she was recently started on lipitor about 1 week ago. Lipitor was discontinued due to know propensity to induce rhabdos in select patients. Her CPK peaked at 484; renal functions better as she was noted also to have WILFREDO which normalized with IV fluids, her creatine now improved 1.5-->0.9. She was instructed to discontinue lipitor until seen by cardiology in clinic. Started on Omega for now for extra cholesterol support. Her cholesterol at this time shows decent control with overall cholesterol = 104/ HDL=37, & LDL=43.2. She has no new complaints and will discharge to home. Vitals grossly stable - follow up in clinic with Cardiology. Repeat BMP and CPK in 2 days.

## 2019-04-30 ENCOUNTER — OFFICE VISIT (OUTPATIENT)
Dept: CARDIOLOGY | Facility: CLINIC | Age: 74
End: 2019-04-30
Payer: MEDICARE

## 2019-04-30 VITALS
OXYGEN SATURATION: 96 % | HEIGHT: 61 IN | DIASTOLIC BLOOD PRESSURE: 84 MMHG | WEIGHT: 170.44 LBS | SYSTOLIC BLOOD PRESSURE: 148 MMHG | BODY MASS INDEX: 32.18 KG/M2 | HEART RATE: 62 BPM

## 2019-04-30 DIAGNOSIS — R55 SYNCOPE AND COLLAPSE: Primary | ICD-10-CM

## 2019-04-30 PROCEDURE — 1101F PR PT FALLS ASSESS DOC 0-1 FALLS W/OUT INJ PAST YR: ICD-10-PCS | Mod: CPTII,S$GLB,, | Performed by: INTERNAL MEDICINE

## 2019-04-30 PROCEDURE — 99499 UNLISTED E&M SERVICE: CPT | Mod: S$GLB,,, | Performed by: INTERNAL MEDICINE

## 2019-04-30 PROCEDURE — 3077F PR MOST RECENT SYSTOLIC BLOOD PRESSURE >= 140 MM HG: ICD-10-PCS | Mod: CPTII,S$GLB,, | Performed by: INTERNAL MEDICINE

## 2019-04-30 PROCEDURE — 99214 PR OFFICE/OUTPT VISIT, EST, LEVL IV, 30-39 MIN: ICD-10-PCS | Mod: S$GLB,,, | Performed by: INTERNAL MEDICINE

## 2019-04-30 PROCEDURE — 3079F PR MOST RECENT DIASTOLIC BLOOD PRESSURE 80-89 MM HG: ICD-10-PCS | Mod: CPTII,S$GLB,, | Performed by: INTERNAL MEDICINE

## 2019-04-30 PROCEDURE — 99499 RISK ADDL DX/OHS AUDIT: ICD-10-PCS | Mod: S$GLB,,, | Performed by: INTERNAL MEDICINE

## 2019-04-30 PROCEDURE — 99999 PR PBB SHADOW E&M-EST. PATIENT-LVL III: ICD-10-PCS | Mod: PBBFAC,,, | Performed by: INTERNAL MEDICINE

## 2019-04-30 PROCEDURE — 1101F PT FALLS ASSESS-DOCD LE1/YR: CPT | Mod: CPTII,S$GLB,, | Performed by: INTERNAL MEDICINE

## 2019-04-30 PROCEDURE — 99214 OFFICE O/P EST MOD 30 MIN: CPT | Mod: S$GLB,,, | Performed by: INTERNAL MEDICINE

## 2019-04-30 PROCEDURE — 3079F DIAST BP 80-89 MM HG: CPT | Mod: CPTII,S$GLB,, | Performed by: INTERNAL MEDICINE

## 2019-04-30 PROCEDURE — 99999 PR PBB SHADOW E&M-EST. PATIENT-LVL III: CPT | Mod: PBBFAC,,, | Performed by: INTERNAL MEDICINE

## 2019-04-30 PROCEDURE — 3077F SYST BP >= 140 MM HG: CPT | Mod: CPTII,S$GLB,, | Performed by: INTERNAL MEDICINE

## 2019-04-30 RX ORDER — METOPROLOL SUCCINATE 100 MG/1
100 TABLET, EXTENDED RELEASE ORAL DAILY
Qty: 30 TABLET | Refills: 3 | Status: ON HOLD | OUTPATIENT
Start: 2019-04-30 | End: 2019-08-14 | Stop reason: SDUPTHER
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
## 2019-05-01 ENCOUNTER — HOSPITAL ENCOUNTER (OUTPATIENT)
Dept: CARDIOLOGY | Facility: HOSPITAL | Age: 74
Discharge: HOME | End: 2019-05-01
Attending: INTERNAL MEDICINE
Payer: MEDICARE

## 2019-05-01 ENCOUNTER — HOSPITAL ENCOUNTER (OUTPATIENT)
Dept: RADIOLOGY | Facility: HOSPITAL | Age: 74
Discharge: HOME | End: 2019-05-01
Attending: INTERNAL MEDICINE
Payer: MEDICARE

## 2019-05-01 DIAGNOSIS — R06.09 DOE (DYSPNEA ON EXERTION): ICD-10-CM

## 2019-05-01 DIAGNOSIS — R07.9 CHEST PAIN, UNSPECIFIED TYPE: ICD-10-CM

## 2019-05-01 DIAGNOSIS — I10 HYPERTENSION, UNSPECIFIED TYPE: ICD-10-CM

## 2019-05-01 LAB
ABDOMINAL AORTA MID EDV: 0 CM/S
ABDOMINAL AORTA MID PSV: 95 CM/S
CV STRESS BASE HR: 72 BPM
DIASTOLIC BLOOD PRESSURE: 80 MMHG
LEFT RENAL DIST DIAS: 13 CM/S
LEFT RENAL DIST SYS: 104 CM/S
LEFT RENAL MID DIAS: 16 CM/S
LEFT RENAL MID RAR: 1.06
LEFT RENAL MID SYS: 101 CM/S
LEFT RENAL ORIGIN DIAS: 11 CM/S
LEFT RENAL ORIGIN SYS: 61 CM/S
LEFT RENAL PROX DIAS: 19 CM/S
LEFT RENAL PROX SYS: 84 CM/S
LEFT RENAL ULTRASOUND ACCELERATION TIME MEASUREMENT 1: 180 MS
LEFT RENAL ULTRASOUND ACCELERATION TIME MEASUREMENT 2: 138 MS
LEFT RENAL ULTRASOUND ACCELERATION TIME MEASUREMENT 3: 150 MS
LEFT RENAL ULTRASOUND ACCELERATION TIME MEASUREMENT AVERAGE: 180 MS
LEFT RENAL ULTRASOUND KIDNEY SIZE MEASUREMENT 1: 9.5 CM
LEFT RENAL ULTRASOUND KIDNEY SIZE MEASUREMENT 2: 3.5 CM
LEFT RENAL ULTRASOUND KIDNEY SIZE MEASUREMENT 3: 4 CM
LEFT RENAL ULTRASOUND KIDNEY SIZE MEASUREMENT AVERAGE: 9.5 CM
LEFT RENAL ULTRASOUND RESISTIVE INDEX MEASUREMENT 1: 0.8
LEFT RENAL ULTRASOUND RESISTIVE INDEX MEASUREMENT 2: 0.82
LEFT RENAL ULTRASOUND RESISTIVE INDEX MEASUREMENT 3: 0.8
LEFT RENAL ULTRASOUND RESISTIVE INDEX MEASUREMENT AVERAGE: 0.82
OHS CV CPX 85 PERCENT MAX PREDICTED HEART RATE MALE: 121
OHS CV CPX MAX PREDICTED HEART RATE: 143
OHS CV CPX PATIENT IS FEMALE: 1
OHS CV CPX PATIENT IS MALE: 0
OHS CV CPX PEAK DIASTOLIC BLOOD PRESSURE: 74 MMHG
OHS CV CPX PEAK HEAR RATE: 112 BPM
OHS CV CPX PEAK RATE PRESSURE PRODUCT: NORMAL
OHS CV CPX PEAK SYSTOLIC BLOOD PRESSURE: 160 MMHG
OHS CV CPX PERCENT MAX PREDICTED HEART RATE ACHIEVED: 79
OHS CV CPX RATE PRESSURE PRODUCT PRESENTING: 9216
OHS CV LEFT RENAL RAR: 1.09
OHS CV RIGHT RENAL RAR: 1.16
OHS CV US LEFT RENAL HIGHEST EDV: 19
OHS CV US LEFT RENAL HIGHEST PSV: 104
OHS CV US RIGHT RENAL HIGHEST EDV: 15
OHS CV US RIGHT RENAL HIGHEST PSV: 110
RIGHT RENAL DIST DIAS: 10 CM/S
RIGHT RENAL DIST SYS: 59 CM/S
RIGHT RENAL MID DIAS: 10 CM/S
RIGHT RENAL MID RAR: 0.66
RIGHT RENAL MID SYS: 63 CM/S
RIGHT RENAL ORIGIN DIAS: 13 CM/S
RIGHT RENAL ORIGIN SYS: 94 CM/S
RIGHT RENAL PROX DIAS: 15 CM/S
RIGHT RENAL PROX SYS: 110 CM/S
RIGHT RENAL ULTRASOUND ACCELERATION TIME MEASUREMENT 1: 126 MS
RIGHT RENAL ULTRASOUND ACCELERATION TIME MEASUREMENT 2: 162 MS
RIGHT RENAL ULTRASOUND ACCELERATION TIME MEASUREMENT 3: 174 MS
RIGHT RENAL ULTRASOUND ACCELERATION TIME MEASUREMENT AVERAGE: 174 MS
RIGHT RENAL ULTRASOUND KIDNEY SIZE MEASUREMENT 1: 9 CM
RIGHT RENAL ULTRASOUND KIDNEY SIZE MEASUREMENT 2: 4.4 CM
RIGHT RENAL ULTRASOUND KIDNEY SIZE MEASUREMENT 3: 4.9 CM
RIGHT RENAL ULTRASOUND KIDNEY SIZE MEASUREMENT AVERAGE: 9 CM
RIGHT RENAL ULTRASOUND RESISTIVE INDEX MEASUREMENT 1: 0.76
RIGHT RENAL ULTRASOUND RESISTIVE INDEX MEASUREMENT 2: 0.78
RIGHT RENAL ULTRASOUND RESISTIVE INDEX MEASUREMENT 3: 0.83
RIGHT RENAL ULTRASOUND RESISTIVE INDEX MEASUREMENT AVERAGE: 0.83
SYSTOLIC BLOOD PRESSURE: 128 MMHG

## 2019-05-01 PROCEDURE — 93018 STRESS TEST WITH MYOCARDIAL PERFUSION (CUPID ONLY): ICD-10-PCS | Mod: ,,, | Performed by: INTERNAL MEDICINE

## 2019-05-01 PROCEDURE — 93975 VASCULAR STUDY: CPT | Mod: 26,,, | Performed by: INTERNAL MEDICINE

## 2019-05-01 PROCEDURE — 93975 CV US RENAL ARTERY STENOSIS HYPERTENSION COMPLETE (CUPID ONLY): ICD-10-PCS | Mod: 26,,, | Performed by: INTERNAL MEDICINE

## 2019-05-01 PROCEDURE — 63600175 PHARM REV CODE 636 W HCPCS: Performed by: INTERNAL MEDICINE

## 2019-05-01 PROCEDURE — 78452 HT MUSCLE IMAGE SPECT MULT: CPT | Mod: 26,,, | Performed by: INTERNAL MEDICINE

## 2019-05-01 PROCEDURE — 93975 VASCULAR STUDY: CPT | Mod: 50

## 2019-05-01 PROCEDURE — 78452 STRESS TEST WITH MYOCARDIAL PERFUSION (CUPID ONLY): ICD-10-PCS | Mod: 26,,, | Performed by: INTERNAL MEDICINE

## 2019-05-01 PROCEDURE — 93017 CV STRESS TEST TRACING ONLY: CPT

## 2019-05-01 PROCEDURE — A9502 TC99M TETROFOSMIN: HCPCS

## 2019-05-01 PROCEDURE — 93016 CV STRESS TEST SUPVJ ONLY: CPT | Mod: ,,, | Performed by: INTERNAL MEDICINE

## 2019-05-01 PROCEDURE — 93016 STRESS TEST WITH MYOCARDIAL PERFUSION (CUPID ONLY): ICD-10-PCS | Mod: ,,, | Performed by: INTERNAL MEDICINE

## 2019-05-01 PROCEDURE — 93018 CV STRESS TEST I&R ONLY: CPT | Mod: ,,, | Performed by: INTERNAL MEDICINE

## 2019-05-01 RX ORDER — REGADENOSON 0.08 MG/ML
0.4 INJECTION, SOLUTION INTRAVENOUS ONCE
Status: COMPLETED | OUTPATIENT
Start: 2019-05-01 | End: 2019-05-01

## 2019-05-01 RX ORDER — REGADENOSON 0.08 MG/ML
0.4 INJECTION, SOLUTION INTRAVENOUS ONCE
Status: DISCONTINUED | OUTPATIENT
Start: 2019-05-01 | End: 2019-05-01

## 2019-05-01 RX ADMIN — REGADENOSON 0.4 MG: 0.08 INJECTION, SOLUTION INTRAVENOUS at 09:05

## 2019-05-06 ENCOUNTER — CLINICAL SUPPORT (OUTPATIENT)
Dept: CARDIOLOGY | Facility: HOSPITAL | Age: 74
End: 2019-05-06
Attending: INTERNAL MEDICINE
Payer: MEDICARE

## 2019-05-06 DIAGNOSIS — R55 SYNCOPE AND COLLAPSE: ICD-10-CM

## 2019-05-06 PROCEDURE — 93271 ECG/MONITORING AND ANALYSIS: CPT

## 2019-05-06 PROCEDURE — 93272 ECG/REVIEW INTERPRET ONLY: CPT | Mod: ,,, | Performed by: INTERNAL MEDICINE

## 2019-05-06 PROCEDURE — 93272 CARDIAC EVENT MONITOR (CUPID ONLY): ICD-10-PCS | Mod: ,,, | Performed by: INTERNAL MEDICINE

## 2019-06-19 ENCOUNTER — OFFICE VISIT (OUTPATIENT)
Dept: CARDIOLOGY | Facility: CLINIC | Age: 74
End: 2019-06-19
Payer: MEDICARE

## 2019-06-19 VITALS
BODY MASS INDEX: 32.07 KG/M2 | DIASTOLIC BLOOD PRESSURE: 80 MMHG | OXYGEN SATURATION: 100 % | WEIGHT: 169.75 LBS | RESPIRATION RATE: 16 BRPM | SYSTOLIC BLOOD PRESSURE: 182 MMHG | HEART RATE: 68 BPM

## 2019-06-19 DIAGNOSIS — R09.89 OTHER SPECIFIED SYMPTOMS AND SIGNS INVOLVING THE CIRCULATORY AND RESPIRATORY SYSTEMS: ICD-10-CM

## 2019-06-19 DIAGNOSIS — I10 HYPERTENSION, UNSPECIFIED TYPE: Primary | ICD-10-CM

## 2019-06-19 DIAGNOSIS — I34.0 NON-RHEUMATIC MITRAL REGURGITATION: ICD-10-CM

## 2019-06-19 PROCEDURE — 99214 PR OFFICE/OUTPT VISIT, EST, LEVL IV, 30-39 MIN: ICD-10-PCS | Mod: S$GLB,,, | Performed by: INTERNAL MEDICINE

## 2019-06-19 PROCEDURE — 99999 PR PBB SHADOW E&M-EST. PATIENT-LVL III: CPT | Mod: PBBFAC,,, | Performed by: INTERNAL MEDICINE

## 2019-06-19 PROCEDURE — 3288F PR FALLS RISK ASSESSMENT DOCUMENTED: ICD-10-PCS | Mod: CPTII,S$GLB,, | Performed by: INTERNAL MEDICINE

## 2019-06-19 PROCEDURE — 1100F PR PT FALLS ASSESS DOC 2+ FALLS/FALL W/INJURY/YR: ICD-10-PCS | Mod: CPTII,S$GLB,, | Performed by: INTERNAL MEDICINE

## 2019-06-19 PROCEDURE — 3079F DIAST BP 80-89 MM HG: CPT | Mod: CPTII,S$GLB,, | Performed by: INTERNAL MEDICINE

## 2019-06-19 PROCEDURE — 1100F PTFALLS ASSESS-DOCD GE2>/YR: CPT | Mod: CPTII,S$GLB,, | Performed by: INTERNAL MEDICINE

## 2019-06-19 PROCEDURE — 99999 PR PBB SHADOW E&M-EST. PATIENT-LVL III: ICD-10-PCS | Mod: PBBFAC,,, | Performed by: INTERNAL MEDICINE

## 2019-06-19 PROCEDURE — 99499 RISK ADDL DX/OHS AUDIT: ICD-10-PCS | Mod: S$GLB,,, | Performed by: INTERNAL MEDICINE

## 2019-06-19 PROCEDURE — 3079F PR MOST RECENT DIASTOLIC BLOOD PRESSURE 80-89 MM HG: ICD-10-PCS | Mod: CPTII,S$GLB,, | Performed by: INTERNAL MEDICINE

## 2019-06-19 PROCEDURE — 3288F FALL RISK ASSESSMENT DOCD: CPT | Mod: CPTII,S$GLB,, | Performed by: INTERNAL MEDICINE

## 2019-06-19 PROCEDURE — 99499 UNLISTED E&M SERVICE: CPT | Mod: S$GLB,,, | Performed by: INTERNAL MEDICINE

## 2019-06-19 PROCEDURE — 99214 OFFICE O/P EST MOD 30 MIN: CPT | Mod: S$GLB,,, | Performed by: INTERNAL MEDICINE

## 2019-06-19 PROCEDURE — 3077F SYST BP >= 140 MM HG: CPT | Mod: CPTII,S$GLB,, | Performed by: INTERNAL MEDICINE

## 2019-06-19 PROCEDURE — 3077F PR MOST RECENT SYSTOLIC BLOOD PRESSURE >= 140 MM HG: ICD-10-PCS | Mod: CPTII,S$GLB,, | Performed by: INTERNAL MEDICINE

## 2019-06-19 RX ORDER — HYDRALAZINE HYDROCHLORIDE 25 MG/1
25 TABLET, FILM COATED ORAL 3 TIMES DAILY
Qty: 90 TABLET | Refills: 3 | Status: SHIPPED | OUTPATIENT
Start: 2019-06-19 | End: 2019-08-01
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
## 2019-06-20 DIAGNOSIS — I10 UNCONTROLLED HYPERTENSION: ICD-10-CM

## 2019-07-02 ENCOUNTER — HOSPITAL ENCOUNTER (OUTPATIENT)
Dept: CARDIOLOGY | Facility: HOSPITAL | Age: 74
Discharge: HOME | End: 2019-07-02
Attending: INTERNAL MEDICINE
Payer: MEDICARE

## 2019-07-02 DIAGNOSIS — I10 HYPERTENSION, UNSPECIFIED TYPE: ICD-10-CM

## 2019-07-02 DIAGNOSIS — R09.89 OTHER SPECIFIED SYMPTOMS AND SIGNS INVOLVING THE CIRCULATORY AND RESPIRATORY SYSTEMS: ICD-10-CM

## 2019-07-02 LAB
IMMEDIATE ARM BP: 158 MMHG
IMMEDIATE LEFT ABI: 1
IMMEDIATE LEFT TIBIAL: 158 MMHG
IMMEDIATE RIGHT ABI: 0.99
IMMEDIATE RIGHT TIBIAL: 157 MMHG
LEFT ABI: 1.21
LEFT ARM BP: 143 MMHG
LEFT ARM SYSTOLIC BLOOD PRESSURE: 143 MMHG
LEFT CBA DIAS: 12 CM/S
LEFT CBA SYS: 57 CM/S
LEFT CCA DIST DIAS: 18 CM/S
LEFT CCA DIST SYS: 77 CM/S
LEFT CCA MID DIAS: 20 CM/S
LEFT CCA MID SYS: 107 CM/S
LEFT CCA PROX DIAS: 16 CM/S
LEFT CCA PROX SYS: 105 CM/S
LEFT DORSALIS PEDIS: 174 MMHG
LEFT ECA DIAS: 15 CM/S
LEFT ECA SYS: 71 CM/S
LEFT ICA DIST DIAS: 12 CM/S
LEFT ICA DIST SYS: 41 CM/S
LEFT ICA MID DIAS: 19 CM/S
LEFT ICA MID SYS: 66 CM/S
LEFT ICA PROX DIAS: 23 CM/S
LEFT ICA PROX SYS: 70 CM/S
LEFT POSTERIOR TIBIAL: 171 MMHG
LEFT TBI: 0.94
LEFT TOE PRESSURE: 135 MMHG
LEFT VERTEBRAL DIAS: 10 CM/S
LEFT VERTEBRAL SYS: 33 CM/S
OHS CV CAROTID RIGHT ICA EDV HIGHEST: 20
OHS CV CAROTID ULTRASOUND LEFT ICA/CCA RATIO: 0.65
OHS CV CAROTID ULTRASOUND RIGHT ICA/CCA RATIO: 0.78
OHS CV PV CAROTID LEFT HIGHEST CCA: 107
OHS CV PV CAROTID LEFT HIGHEST ICA: 70
OHS CV PV CAROTID RIGHT HIGHEST CCA: 90
OHS CV PV CAROTID RIGHT HIGHEST ICA: 70
OHS CV US CAROTID LEFT HIGHEST EDV: 23
RIGHT ABI: 1.16
RIGHT ARM BP: 144 MMHG
RIGHT ARM SYSTOLIC BLOOD PRESSURE: 144 MMHG
RIGHT CBA DIAS: 11 CM/S
RIGHT CBA SYS: 66 CM/S
RIGHT CCA DIST DIAS: 19 CM/S
RIGHT CCA DIST SYS: 82 CM/S
RIGHT CCA MID DIAS: 14 CM/S
RIGHT CCA MID SYS: 90 CM/S
RIGHT CCA PROX DIAS: 12 CM/S
RIGHT CCA PROX SYS: 58 CM/S
RIGHT DORSALIS PEDIS: 167 MMHG
RIGHT ECA DIAS: 28 CM/S
RIGHT ECA SYS: 173 CM/S
RIGHT ICA DIST DIAS: 15 CM/S
RIGHT ICA DIST SYS: 43 CM/S
RIGHT ICA MID DIAS: 20 CM/S
RIGHT ICA MID SYS: 62 CM/S
RIGHT ICA PROX DIAS: 19 CM/S
RIGHT ICA PROX SYS: 70 CM/S
RIGHT POSTERIOR TIBIAL: 166 MMHG
RIGHT TBI: 0.82
RIGHT TOE PRESSURE: 118 MMHG
RIGHT VERTEBRAL DIAS: 13 CM/S
RIGHT VERTEBRAL SYS: 48 CM/S
TOE RAISES: 31

## 2019-07-02 PROCEDURE — 93924 LWR XTR VASC STDY BILAT: CPT | Mod: 26,,, | Performed by: INTERNAL MEDICINE

## 2019-07-02 PROCEDURE — 93880 EXTRACRANIAL BILAT STUDY: CPT | Mod: 26,,, | Performed by: INTERNAL MEDICINE

## 2019-07-02 PROCEDURE — 93924 CV US ANKLE BRACHIAL INDICES W STRESS W TOE TRACINGS (CUPID ONLY): ICD-10-PCS | Mod: 26,,, | Performed by: INTERNAL MEDICINE

## 2019-07-02 PROCEDURE — 93880 EXTRACRANIAL BILAT STUDY: CPT | Mod: 50

## 2019-07-02 PROCEDURE — 93924 LWR XTR VASC STDY BILAT: CPT | Mod: 50

## 2019-07-02 PROCEDURE — 93880 CV US DOPPLER CAROTID (CUPID ONLY): ICD-10-PCS | Mod: 26,,, | Performed by: INTERNAL MEDICINE

## 2019-07-11 ENCOUNTER — PATIENT OUTREACH (OUTPATIENT)
Dept: ADMINISTRATIVE | Facility: OTHER | Age: 74
End: 2019-07-11

## 2019-07-11 DIAGNOSIS — Z12.11 SCREEN FOR COLON CANCER: Primary | ICD-10-CM

## 2019-07-30 ENCOUNTER — PATIENT OUTREACH (OUTPATIENT)
Dept: ADMINISTRATIVE | Facility: OTHER | Age: 74
End: 2019-07-30

## 2019-08-01 ENCOUNTER — OFFICE VISIT (OUTPATIENT)
Dept: CARDIOLOGY | Facility: CLINIC | Age: 74
End: 2019-08-01
Payer: MEDICARE

## 2019-08-01 VITALS
DIASTOLIC BLOOD PRESSURE: 74 MMHG | HEART RATE: 70 BPM | OXYGEN SATURATION: 98 % | BODY MASS INDEX: 32.05 KG/M2 | SYSTOLIC BLOOD PRESSURE: 160 MMHG | HEIGHT: 61 IN | WEIGHT: 169.75 LBS

## 2019-08-01 DIAGNOSIS — I10 ESSENTIAL HYPERTENSION: Primary | ICD-10-CM

## 2019-08-01 DIAGNOSIS — I34.0 NON-RHEUMATIC MITRAL REGURGITATION: ICD-10-CM

## 2019-08-01 PROCEDURE — 99214 OFFICE O/P EST MOD 30 MIN: CPT | Mod: S$GLB,,, | Performed by: INTERNAL MEDICINE

## 2019-08-01 PROCEDURE — 99999 PR PBB SHADOW E&M-EST. PATIENT-LVL III: CPT | Mod: PBBFAC,,, | Performed by: INTERNAL MEDICINE

## 2019-08-01 PROCEDURE — 3078F DIAST BP <80 MM HG: CPT | Mod: CPTII,S$GLB,, | Performed by: INTERNAL MEDICINE

## 2019-08-01 PROCEDURE — 99499 RISK ADDL DX/OHS AUDIT: ICD-10-PCS | Mod: S$GLB,,, | Performed by: INTERNAL MEDICINE

## 2019-08-01 PROCEDURE — 99999 PR PBB SHADOW E&M-EST. PATIENT-LVL III: ICD-10-PCS | Mod: PBBFAC,,, | Performed by: INTERNAL MEDICINE

## 2019-08-01 PROCEDURE — 99214 PR OFFICE/OUTPT VISIT, EST, LEVL IV, 30-39 MIN: ICD-10-PCS | Mod: S$GLB,,, | Performed by: INTERNAL MEDICINE

## 2019-08-01 PROCEDURE — 1101F PT FALLS ASSESS-DOCD LE1/YR: CPT | Mod: CPTII,S$GLB,, | Performed by: INTERNAL MEDICINE

## 2019-08-01 PROCEDURE — 99499 UNLISTED E&M SERVICE: CPT | Mod: S$GLB,,, | Performed by: INTERNAL MEDICINE

## 2019-08-01 PROCEDURE — 1101F PR PT FALLS ASSESS DOC 0-1 FALLS W/OUT INJ PAST YR: ICD-10-PCS | Mod: CPTII,S$GLB,, | Performed by: INTERNAL MEDICINE

## 2019-08-01 PROCEDURE — 3078F PR MOST RECENT DIASTOLIC BLOOD PRESSURE < 80 MM HG: ICD-10-PCS | Mod: CPTII,S$GLB,, | Performed by: INTERNAL MEDICINE

## 2019-08-01 PROCEDURE — 3077F PR MOST RECENT SYSTOLIC BLOOD PRESSURE >= 140 MM HG: ICD-10-PCS | Mod: CPTII,S$GLB,, | Performed by: INTERNAL MEDICINE

## 2019-08-01 PROCEDURE — 3077F SYST BP >= 140 MM HG: CPT | Mod: CPTII,S$GLB,, | Performed by: INTERNAL MEDICINE

## 2019-08-01 RX ORDER — HYDRALAZINE HYDROCHLORIDE 25 MG/1
50 TABLET, FILM COATED ORAL 3 TIMES DAILY
Qty: 90 TABLET | Refills: 3 | Status: ON HOLD | OUTPATIENT
Start: 2019-08-01 | End: 2019-08-14 | Stop reason: SDUPTHER
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
## 2019-08-03 ENCOUNTER — LAB VISIT (OUTPATIENT)
Dept: LAB | Facility: HOSPITAL | Age: 74
End: 2019-08-03
Attending: FAMILY MEDICINE
Payer: MEDICARE

## 2019-08-03 DIAGNOSIS — Z12.11 SCREEN FOR COLON CANCER: ICD-10-CM

## 2019-08-03 PROCEDURE — 82274 ASSAY TEST FOR BLOOD FECAL: CPT

## 2019-08-07 LAB — HEMOCCULT STL QL IA: NEGATIVE

## 2019-08-13 ENCOUNTER — HOSPITAL ENCOUNTER (OUTPATIENT)
Facility: HOSPITAL | Age: 74
Discharge: HOME | End: 2019-08-14
Attending: EMERGENCY MEDICINE | Admitting: EMERGENCY MEDICINE
Payer: MEDICARE

## 2019-08-13 DIAGNOSIS — R07.9 CHEST PAIN, UNSPECIFIED TYPE: ICD-10-CM

## 2019-08-13 DIAGNOSIS — R42 DIZZINESS: ICD-10-CM

## 2019-08-13 DIAGNOSIS — I10 HYPERTENSION, UNSPECIFIED TYPE: ICD-10-CM

## 2019-08-13 DIAGNOSIS — R55 SYNCOPE: Primary | ICD-10-CM

## 2019-08-13 DIAGNOSIS — R06.09 DOE (DYSPNEA ON EXERTION): ICD-10-CM

## 2019-08-13 LAB
ALBUMIN SERPL BCP-MCNC: 3.7 G/DL (ref 3.5–5.2)
ALP SERPL-CCNC: 58 U/L (ref 55–135)
ALT SERPL W/O P-5'-P-CCNC: 29 U/L (ref 10–44)
ANION GAP SERPL CALC-SCNC: 11 MMOL/L (ref 8–16)
AST SERPL-CCNC: 48 U/L (ref 10–40)
BACTERIA #/AREA URNS AUTO: NORMAL /HPF
BASOPHILS # BLD AUTO: 0.03 K/UL (ref 0–0.2)
BASOPHILS NFR BLD: 0.2 % (ref 0–1.9)
BILIRUB SERPL-MCNC: 0.3 MG/DL (ref 0.1–1)
BILIRUB UR QL STRIP: NEGATIVE
BUN SERPL-MCNC: 16 MG/DL (ref 8–23)
CALCIUM SERPL-MCNC: 9.5 MG/DL (ref 8.7–10.5)
CHLORIDE SERPL-SCNC: 105 MMOL/L (ref 95–110)
CLARITY UR REFRACT.AUTO: CLEAR
CO2 SERPL-SCNC: 21 MMOL/L (ref 23–29)
COLOR UR AUTO: YELLOW
CREAT SERPL-MCNC: 1.1 MG/DL (ref 0.5–1.4)
DIFFERENTIAL METHOD: ABNORMAL
EOSINOPHIL # BLD AUTO: 0.1 K/UL (ref 0–0.5)
EOSINOPHIL NFR BLD: 0.5 % (ref 0–8)
ERYTHROCYTE [DISTWIDTH] IN BLOOD BY AUTOMATED COUNT: 14.3 % (ref 11.5–14.5)
EST. GFR  (AFRICAN AMERICAN): 58 ML/MIN/1.73 M^2
EST. GFR  (NON AFRICAN AMERICAN): 50.3 ML/MIN/1.73 M^2
GLUCOSE SERPL-MCNC: 102 MG/DL (ref 70–110)
GLUCOSE UR QL STRIP: NEGATIVE
HCT VFR BLD AUTO: 41.1 % (ref 37–48.5)
HGB BLD-MCNC: 12.8 G/DL (ref 12–16)
HGB UR QL STRIP: NEGATIVE
IMM GRANULOCYTES # BLD AUTO: 0.08 K/UL (ref 0–0.04)
IMM GRANULOCYTES NFR BLD AUTO: 0.5 % (ref 0–0.5)
KETONES UR QL STRIP: NEGATIVE
LEUKOCYTE ESTERASE UR QL STRIP: NEGATIVE
LYMPHOCYTES # BLD AUTO: 1.9 K/UL (ref 1–4.8)
LYMPHOCYTES NFR BLD: 11.3 % (ref 18–48)
MAGNESIUM SERPL-MCNC: 1.9 MG/DL (ref 1.6–2.6)
MCH RBC QN AUTO: 29 PG (ref 27–31)
MCHC RBC AUTO-ENTMCNC: 31.1 G/DL (ref 32–36)
MCV RBC AUTO: 93 FL (ref 82–98)
MICROSCOPIC COMMENT: NORMAL
MONOCYTES # BLD AUTO: 1.4 K/UL (ref 0.3–1)
MONOCYTES NFR BLD: 8.3 % (ref 4–15)
NEUTROPHILS # BLD AUTO: 13.2 K/UL (ref 1.8–7.7)
NEUTROPHILS NFR BLD: 79.2 % (ref 38–73)
NITRITE UR QL STRIP: NEGATIVE
NRBC BLD-RTO: 0 /100 WBC
PH UR STRIP: 5 [PH] (ref 5–8)
PLATELET # BLD AUTO: 326 K/UL (ref 150–350)
PMV BLD AUTO: 9.9 FL (ref 9.2–12.9)
POTASSIUM SERPL-SCNC: 4.6 MMOL/L (ref 3.5–5.1)
PROT SERPL-MCNC: 8.1 G/DL (ref 6–8.4)
PROT UR QL STRIP: NEGATIVE
RBC # BLD AUTO: 4.42 M/UL (ref 4–5.4)
RBC #/AREA URNS AUTO: 0 /HPF (ref 0–4)
SODIUM SERPL-SCNC: 137 MMOL/L (ref 136–145)
SP GR UR STRIP: 1.02 (ref 1–1.03)
SQUAMOUS #/AREA URNS AUTO: 0 /HPF
TROPONIN I SERPL DL<=0.01 NG/ML-MCNC: <0.006 NG/ML (ref 0–0.03)
URN SPEC COLLECT METH UR: NORMAL
WBC # BLD AUTO: 16.68 K/UL (ref 3.9–12.7)
WBC #/AREA URNS AUTO: 0 /HPF (ref 0–5)

## 2019-08-13 PROCEDURE — 80053 COMPREHEN METABOLIC PANEL: CPT

## 2019-08-13 PROCEDURE — G0378 HOSPITAL OBSERVATION PER HR: HCPCS

## 2019-08-13 PROCEDURE — 83735 ASSAY OF MAGNESIUM: CPT

## 2019-08-13 PROCEDURE — 96374 THER/PROPH/DIAG INJ IV PUSH: CPT

## 2019-08-13 PROCEDURE — 81001 URINALYSIS AUTO W/SCOPE: CPT

## 2019-08-13 PROCEDURE — 96361 HYDRATE IV INFUSION ADD-ON: CPT

## 2019-08-13 PROCEDURE — 99285 EMERGENCY DEPT VISIT HI MDM: CPT | Mod: 25

## 2019-08-13 PROCEDURE — 25000003 PHARM REV CODE 250: Performed by: PHYSICIAN ASSISTANT

## 2019-08-13 PROCEDURE — 63600175 PHARM REV CODE 636 W HCPCS: Performed by: EMERGENCY MEDICINE

## 2019-08-13 PROCEDURE — 99219 PR INITIAL OBSERVATION CARE,LEVL II: ICD-10-PCS | Mod: ,,, | Performed by: PHYSICIAN ASSISTANT

## 2019-08-13 PROCEDURE — 93010 ELECTROCARDIOGRAM REPORT: CPT | Mod: ,,, | Performed by: INTERNAL MEDICINE

## 2019-08-13 PROCEDURE — 93005 ELECTROCARDIOGRAM TRACING: CPT

## 2019-08-13 PROCEDURE — 99219 PR INITIAL OBSERVATION CARE,LEVL II: CPT | Mod: ,,, | Performed by: PHYSICIAN ASSISTANT

## 2019-08-13 PROCEDURE — 99285 PR EMERGENCY DEPT VISIT,LEVEL V: ICD-10-PCS | Mod: ,,, | Performed by: EMERGENCY MEDICINE

## 2019-08-13 PROCEDURE — 84484 ASSAY OF TROPONIN QUANT: CPT

## 2019-08-13 PROCEDURE — 99285 EMERGENCY DEPT VISIT HI MDM: CPT | Mod: ,,, | Performed by: EMERGENCY MEDICINE

## 2019-08-13 PROCEDURE — 93010 EKG 12-LEAD: ICD-10-PCS | Mod: ,,, | Performed by: INTERNAL MEDICINE

## 2019-08-13 PROCEDURE — 85025 COMPLETE CBC W/AUTO DIFF WBC: CPT

## 2019-08-13 RX ORDER — AMLODIPINE BESYLATE 10 MG/1
10 TABLET ORAL DAILY
Status: DISCONTINUED | OUTPATIENT
Start: 2019-08-14 | End: 2019-08-14

## 2019-08-13 RX ORDER — ONDANSETRON 8 MG/1
8 TABLET, ORALLY DISINTEGRATING ORAL EVERY 8 HOURS PRN
Status: DISCONTINUED | OUTPATIENT
Start: 2019-08-13 | End: 2019-08-14 | Stop reason: HOSPADM

## 2019-08-13 RX ORDER — IBUPROFEN 200 MG
16 TABLET ORAL
Status: DISCONTINUED | OUTPATIENT
Start: 2019-08-13 | End: 2019-08-14 | Stop reason: HOSPADM

## 2019-08-13 RX ORDER — RAMELTEON 8 MG/1
8 TABLET ORAL NIGHTLY PRN
Status: DISCONTINUED | OUTPATIENT
Start: 2019-08-13 | End: 2019-08-14 | Stop reason: HOSPADM

## 2019-08-13 RX ORDER — NAPROXEN SODIUM 220 MG/1
81 TABLET, FILM COATED ORAL DAILY
Status: DISCONTINUED | OUTPATIENT
Start: 2019-08-14 | End: 2019-08-14 | Stop reason: HOSPADM

## 2019-08-13 RX ORDER — IPRATROPIUM BROMIDE AND ALBUTEROL SULFATE 2.5; .5 MG/3ML; MG/3ML
3 SOLUTION RESPIRATORY (INHALATION) EVERY 4 HOURS PRN
Status: DISCONTINUED | OUTPATIENT
Start: 2019-08-13 | End: 2019-08-14 | Stop reason: HOSPADM

## 2019-08-13 RX ORDER — LEVOTHYROXINE SODIUM 25 UG/1
25 TABLET ORAL DAILY
Status: DISCONTINUED | OUTPATIENT
Start: 2019-08-14 | End: 2019-08-14 | Stop reason: HOSPADM

## 2019-08-13 RX ORDER — GLUCAGON 1 MG
1 KIT INJECTION
Status: DISCONTINUED | OUTPATIENT
Start: 2019-08-13 | End: 2019-08-14 | Stop reason: HOSPADM

## 2019-08-13 RX ORDER — HYDRALAZINE HYDROCHLORIDE 25 MG/1
25 TABLET, FILM COATED ORAL 3 TIMES DAILY
Status: DISCONTINUED | OUTPATIENT
Start: 2019-08-13 | End: 2019-08-14

## 2019-08-13 RX ORDER — LISINOPRIL 20 MG/1
40 TABLET ORAL DAILY
Status: DISCONTINUED | OUTPATIENT
Start: 2019-08-14 | End: 2019-08-14

## 2019-08-13 RX ORDER — METOPROLOL SUCCINATE 50 MG/1
100 TABLET, EXTENDED RELEASE ORAL DAILY
Status: DISCONTINUED | OUTPATIENT
Start: 2019-08-14 | End: 2019-08-14

## 2019-08-13 RX ORDER — SODIUM CHLORIDE 0.9 % (FLUSH) 0.9 %
10 SYRINGE (ML) INJECTION
Status: DISCONTINUED | OUTPATIENT
Start: 2019-08-13 | End: 2019-08-14 | Stop reason: HOSPADM

## 2019-08-13 RX ORDER — ONDANSETRON 2 MG/ML
8 INJECTION INTRAMUSCULAR; INTRAVENOUS
Status: COMPLETED | OUTPATIENT
Start: 2019-08-13 | End: 2019-08-13

## 2019-08-13 RX ORDER — ACETAMINOPHEN 325 MG/1
650 TABLET ORAL EVERY 4 HOURS PRN
Status: DISCONTINUED | OUTPATIENT
Start: 2019-08-13 | End: 2019-08-14 | Stop reason: HOSPADM

## 2019-08-13 RX ORDER — IBUPROFEN 200 MG
24 TABLET ORAL
Status: DISCONTINUED | OUTPATIENT
Start: 2019-08-13 | End: 2019-08-14 | Stop reason: HOSPADM

## 2019-08-13 RX ORDER — BISACODYL 10 MG
10 SUPPOSITORY, RECTAL RECTAL DAILY PRN
Status: DISCONTINUED | OUTPATIENT
Start: 2019-08-13 | End: 2019-08-14 | Stop reason: HOSPADM

## 2019-08-13 RX ADMIN — HYDRALAZINE HYDROCHLORIDE 25 MG: 25 TABLET, FILM COATED ORAL at 08:08

## 2019-08-13 RX ADMIN — SODIUM CHLORIDE 500 ML: 0.9 INJECTION, SOLUTION INTRAVENOUS at 11:08

## 2019-08-13 RX ADMIN — ONDANSETRON 8 MG: 2 INJECTION INTRAMUSCULAR; INTRAVENOUS at 12:08
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
## 2019-08-13 NOTE — ED NOTES
Pt placed on bed pan for urine sample. Pt refused perwick, was unable to ambulate to restroom. Family states they will call when pt is done.

## 2019-08-13 NOTE — HPI
"Kristin Goodman is a 74F with HTN, hypothyroidism, T2DM without insulin use, who presents to ED for witnessed syncopal episode. 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 Her co-worker instructed her to move to the other cooler room and sit down for a while. When she moved to the other room to sit down she vomited and then passed out. The next thing she remembers, her coworker was trying to get her to eat a peppermint. She cannot recall how long she was out. No loss of bowel or bladder, no CP prior to event. She did reportedly hit her head when she syncopized from the seated position. Of note, she says about a week ago she had a diarrheal illness that eventually resolved on its own. At the time of my exam she says she is feeling better but just feels generally weak and lightheaded. She denies fevers, HA, vision changes, dizziness, diarrhea.     Of note, pt had recent stress test and cardiac event monitor in 5/2019 that was unremarkable. Recent carotid US unremarkable. Recently increased hydralazine 25 mg TID to 50mg TID    In ED VSS. Afebrile with slight leukocytosis 16.6K. EKG no acute ischemic changes, trop WNL. CXR and UA unremarkable. CTH no acute infarct or hemorrhage.   " "Subjective:       Patient ID: Atif Morales is a 63 y.o. male.    Chief Complaint: Cyst (c/o cyst to under right knee w/ drainage)  He was last seen in primary care by Ronald on 11/03/2017.  I last saw him on 07/18/2017.  HPI   He has noticed cyst for 4 months and has started draining about 3-4 weeks ago. States "I have been fooling with it".  Has being using hydrogen peroxide for 3-4 days  Vitals:    11/30/18 0746   BP: 130/88   Temp: 98.1 °F (36.7 °C)     BP Readings from Last 3 Encounters:   11/30/18 130/88   11/03/17 112/78   10/20/17 135/80     Review of Systems    Lab Results   Component Value Date    WBC 6.48 11/30/2018    HGB 15.3 11/30/2018    HCT 47.3 11/30/2018    MCV 94 11/30/2018     11/30/2018     CMP  Sodium   Date Value Ref Range Status   11/30/2018 140 136 - 145 mmol/L Final     Potassium   Date Value Ref Range Status   11/30/2018 4.4 3.5 - 5.1 mmol/L Final     Chloride   Date Value Ref Range Status   11/30/2018 104 95 - 110 mmol/L Final     CO2   Date Value Ref Range Status   11/30/2018 29 23 - 29 mmol/L Final     Glucose   Date Value Ref Range Status   11/30/2018 106 70 - 110 mg/dL Final     BUN, Bld   Date Value Ref Range Status   11/30/2018 21 8 - 23 mg/dL Final     Creatinine   Date Value Ref Range Status   11/30/2018 1.2 0.5 - 1.4 mg/dL Final     Calcium   Date Value Ref Range Status   11/30/2018 10.6 (H) 8.7 - 10.5 mg/dL Final     Total Protein   Date Value Ref Range Status   11/30/2018 7.8 6.0 - 8.4 g/dL Final     Albumin   Date Value Ref Range Status   11/30/2018 3.8 3.5 - 5.2 g/dL Final     Total Bilirubin   Date Value Ref Range Status   11/30/2018 0.6 0.1 - 1.0 mg/dL Final     Comment:     For infants and newborns, interpretation of results should be based  on gestational age, weight and in agreement with clinical  observations.  Premature Infant recommended reference ranges:  Up to 24 hours.............<8.0 mg/dL  Up to 48 hours............<12.0 mg/dL  3-5 " days..................<15.0 mg/dL  6-29 days.................<15.0 mg/dL       Alkaline Phosphatase   Date Value Ref Range Status   11/30/2018 69 55 - 135 U/L Final     AST   Date Value Ref Range Status   11/30/2018 33 10 - 40 U/L Final     ALT   Date Value Ref Range Status   11/30/2018 48 (H) 10 - 44 U/L Final     Anion Gap   Date Value Ref Range Status   11/30/2018 7 (L) 8 - 16 mmol/L Final     eGFR if    Date Value Ref Range Status   11/30/2018 >60.0 >60 mL/min/1.73 m^2 Final     eGFR if non    Date Value Ref Range Status   11/30/2018 >60.0 >60 mL/min/1.73 m^2 Final     Comment:     Calculation used to obtain the estimated glomerular filtration  rate (eGFR) is the CKD-EPI equation.              Objective:      Physical Exam   Constitutional: He is oriented to person, place, and time. Vital signs are normal. He appears well-developed and well-nourished. He is active and cooperative.   HENT:   Head: Normocephalic and atraumatic.   Right Ear: Hearing, tympanic membrane, external ear and ear canal normal.   Left Ear: Hearing, tympanic membrane, external ear and ear canal normal.   Nose: Nose normal.   Mouth/Throat: Uvula is midline, oropharynx is clear and moist and mucous membranes are normal.   Eyes: Lids are normal.   Neck: Trachea normal, normal range of motion, full passive range of motion without pain and phonation normal. Neck supple.   Cardiovascular: Normal rate, regular rhythm and normal heart sounds.   Pulmonary/Chest: Effort normal and breath sounds normal.   Abdominal: Soft. Bowel sounds are normal. There is no tenderness.       Musculoskeletal: Normal range of motion.   Lymphadenopathy:        Head (right side): No submental, no submandibular, no tonsillar, no preauricular, no posterior auricular and no occipital adenopathy present.        Head (left side): No submental, no submandibular, no tonsillar, no preauricular, no posterior auricular and no occipital adenopathy  present.     He has no cervical adenopathy.   Neurological: He is alert and oriented to person, place, and time.   Skin: Skin is warm, dry and intact.   Psychiatric: He has a normal mood and affect. His speech is normal and behavior is normal. Judgment and thought content normal. Cognition and memory are normal.   Nursing note and vitals reviewed.      Assessment & Plan:       Skin lesion of right leg  -     (In Office Administered) Tdap Vaccine  -     clindamycin (CLEOCIN) 300 MG capsule; Take 1 capsule (300 mg total) by mouth every 6 (six) hours. Take for an addiitonal 3 days for 7 days  Dispense: 28 capsule; Refill: 0  -     Hemoglobin A1c; Future; Expected date: 11/30/2018    Obesity (BMI 30-39.9)  -     Lipid panel; Future; Expected date: 11/30/2018  -     Comprehensive metabolic panel; Future; Expected date: 11/30/2018  -     CBC auto differential; Future; Expected date: 11/30/2018  -     Hemoglobin A1c; Future; Expected date: 11/30/2018    Essential hypertension  -     Comprehensive metabolic panel; Future; Expected date: 11/30/2018  -     CBC auto differential; Future; Expected date: 11/30/2018  -     Hemoglobin A1c; Future; Expected date: 11/30/2018    Need for diphtheria-tetanus-pertussis (Tdap) vaccine  -     (In Office Administered) Tdap Vaccine    Hyperlipidemia LDL goal <100  -     Lipid panel; Future; Expected date: 11/30/2018  -     Comprehensive metabolic panel; Future; Expected date: 11/30/2018  -     CBC auto differential; Future; Expected date: 11/30/2018  -     Hemoglobin A1c; Future; Expected date: 11/30/2018    Screening for prostate cancer  -     PSA, Screening; Future; Expected date: 11/30/2018    Other orders  -     Influenza - Quadrivalent (3 years & older) (PF)    he is currently on clindamycin from dentist and I have advised him to take until all gone  I have instructed him to stop hydrogen peroxide  I have given him another script of clindamycin with instructions to take it for 3 more  days.      Medication List           Accurate as of 11/30/18 11:59 PM. If you have any questions, ask your nurse or doctor.               CHANGE how you take these medications    clindamycin 300 MG capsule  Commonly known as:  CLEOCIN  Take 1 capsule (300 mg total) by mouth every 6 (six) hours. Take for an addiitonal 3 days for 7 days  What changed:    · how to take this  · additional instructions  Changed by:  Paloma Llamas DNP, APRN        CONTINUE taking these medications    busPIRone 30 MG Tab  Commonly known as:  BUSPAR  TAKE 1 TABLET BY MOUTH TWICE DAILY     DIETARY SUPPLEMENT ORAL     efinaconazole 10 % Ibis  Commonly known as:  JUBLIA  Apply to nails daily x 48 weeks     escitalopram oxalate 20 MG tablet  Commonly known as:  LEXAPRO  Take 1 tablet (20 mg total) by mouth once daily.     hydroCHLOROthiazide 12.5 MG Tab  Commonly known as:  HYDRODIURIL  TAKE 1 TABLET(12.5 MG) BY MOUTH EVERY DAY     lactobacillus acidophilus & bulgar 100 million cell packet  Commonly known as:  LACTINEX     tadalafil 20 MG Tab  Commonly known as:  CIALIS  Take 1 tablet (20 mg total) by mouth once daily.     urea 40 % Crea  Commonly known as:  CARMOL  AAA thick skin on feet qd prn        STOP taking these medications    cephALEXin 500 MG capsule  Commonly known as:  KEFLEX  Stopped by:  Paloma Llamas DNP, APRN     ibuprofen 600 MG tablet  Commonly known as:  ADVIL,MOTRIN  Stopped by:  Paloma Llamas DNP, APRN     VENTOLIN HFA 90 mcg/actuation inhaler  Generic drug:  albuterol  Stopped by:  Paloma Llamas DNP, APRN           Where to Get Your Medications      These medications were sent to [facility] [address] [place] [address] AT [facility] [address] [address]    Phone:  [phone]   · clindamycin 300 MG capsule           Follow-up in about 1 month (around 12/30/2018), or if symptoms worsen or fail to improve.

## 2019-08-13 NOTE — PLAN OF CARE
Problem: Adult Inpatient Plan of Care  Goal: Plan of Care Review  Pt arrived from ED with daughter, pt AAOx4. Pt states she has no dizziness at this time.Pt oriented to the room, call light in reach, instructed to call for assistance.

## 2019-08-14 VITALS
DIASTOLIC BLOOD PRESSURE: 57 MMHG | HEIGHT: 63 IN | SYSTOLIC BLOOD PRESSURE: 114 MMHG | BODY MASS INDEX: 29.77 KG/M2 | RESPIRATION RATE: 14 BRPM | OXYGEN SATURATION: 98 % | WEIGHT: 168 LBS | TEMPERATURE: 98 F | HEART RATE: 70 BPM

## 2019-08-14 LAB
ANION GAP SERPL CALC-SCNC: 10 MMOL/L (ref 8–16)
ASCENDING AORTA: 2.32 CM
AV INDEX (PROSTH): 0.67
AV MEAN GRADIENT: 9 MMHG
AV PEAK GRADIENT: 17 MMHG
AV VALVE AREA: 1.83 CM2
AV VELOCITY RATIO: 0.64
BASOPHILS # BLD AUTO: 0.02 K/UL (ref 0–0.2)
BASOPHILS NFR BLD: 0.2 % (ref 0–1.9)
BSA FOR ECHO PROCEDURE: 1.84 M2
BUN SERPL-MCNC: 14 MG/DL (ref 8–23)
CALCIUM SERPL-MCNC: 9.3 MG/DL (ref 8.7–10.5)
CHLORIDE SERPL-SCNC: 106 MMOL/L (ref 95–110)
CO2 SERPL-SCNC: 26 MMOL/L (ref 23–29)
CREAT SERPL-MCNC: 1.1 MG/DL (ref 0.5–1.4)
CV ECHO LV RWT: 0.36 CM
DIFFERENTIAL METHOD: ABNORMAL
DOP CALC AO PEAK VEL: 2.07 M/S
DOP CALC AO VTI: 41.56 CM
DOP CALC LVOT AREA: 2.7 CM2
DOP CALC LVOT DIAMETER: 1.87 CM
DOP CALC LVOT PEAK VEL: 1.33 M/S
DOP CALC LVOT STROKE VOLUME: 76.07 CM3
DOP CALCLVOT PEAK VEL VTI: 27.71 CM
E WAVE DECELERATION TIME: 187.74 MSEC
E/A RATIO: 0.79
E/E' RATIO: 9.79 M/S
ECHO LV POSTERIOR WALL: 0.69 CM (ref 0.6–1.1)
EOSINOPHIL # BLD AUTO: 0.1 K/UL (ref 0–0.5)
EOSINOPHIL NFR BLD: 1.3 % (ref 0–8)
ERYTHROCYTE [DISTWIDTH] IN BLOOD BY AUTOMATED COUNT: 14.2 % (ref 11.5–14.5)
EST. GFR  (AFRICAN AMERICAN): 58 ML/MIN/1.73 M^2
EST. GFR  (NON AFRICAN AMERICAN): 50.3 ML/MIN/1.73 M^2
ESTIMATED AVG GLUCOSE: 114 MG/DL (ref 68–131)
FRACTIONAL SHORTENING: 33 % (ref 28–44)
GLUCOSE SERPL-MCNC: 101 MG/DL (ref 70–110)
HBA1C MFR BLD HPLC: 5.6 % (ref 4–5.6)
HCT VFR BLD AUTO: 40.3 % (ref 37–48.5)
HGB BLD-MCNC: 12.5 G/DL (ref 12–16)
IMM GRANULOCYTES # BLD AUTO: 0.02 K/UL (ref 0–0.04)
IMM GRANULOCYTES NFR BLD AUTO: 0.2 % (ref 0–0.5)
INTERVENTRICULAR SEPTUM: 0.9 CM (ref 0.6–1.1)
LA MAJOR: 5.22 CM
LA MINOR: 5.12 CM
LA WIDTH: 4.1 CM
LEFT ATRIUM SIZE: 4.12 CM
LEFT ATRIUM VOLUME INDEX: 41.3 ML/M2
LEFT ATRIUM VOLUME: 74.22 CM3
LEFT INTERNAL DIMENSION IN SYSTOLE: 2.57 CM (ref 2.1–4)
LEFT VENTRICLE DIASTOLIC VOLUME INDEX: 34.89 ML/M2
LEFT VENTRICLE DIASTOLIC VOLUME: 62.65 ML
LEFT VENTRICLE MASS INDEX: 48 G/M2
LEFT VENTRICLE SYSTOLIC VOLUME INDEX: 13.4 ML/M2
LEFT VENTRICLE SYSTOLIC VOLUME: 23.99 ML
LEFT VENTRICULAR INTERNAL DIMENSION IN DIASTOLE: 3.82 CM (ref 3.5–6)
LEFT VENTRICULAR MASS: 85.96 G
LV LATERAL E/E' RATIO: 9.3 M/S
LV SEPTAL E/E' RATIO: 10.33 M/S
LYMPHOCYTES # BLD AUTO: 2.8 K/UL (ref 1–4.8)
LYMPHOCYTES NFR BLD: 31 % (ref 18–48)
MAGNESIUM SERPL-MCNC: 2.1 MG/DL (ref 1.6–2.6)
MCH RBC QN AUTO: 28.5 PG (ref 27–31)
MCHC RBC AUTO-ENTMCNC: 31 G/DL (ref 32–36)
MCV RBC AUTO: 92 FL (ref 82–98)
MONOCYTES # BLD AUTO: 0.9 K/UL (ref 0.3–1)
MONOCYTES NFR BLD: 9.8 % (ref 4–15)
MV PEAK A VEL: 1.18 M/S
MV PEAK E VEL: 0.93 M/S
NEUTROPHILS # BLD AUTO: 5.2 K/UL (ref 1.8–7.7)
NEUTROPHILS NFR BLD: 57.5 % (ref 38–73)
NRBC BLD-RTO: 0 /100 WBC
PHOSPHATE SERPL-MCNC: 3.4 MG/DL (ref 2.7–4.5)
PISA TR MAX VEL: 2.92 M/S
PLATELET # BLD AUTO: 371 K/UL (ref 150–350)
PMV BLD AUTO: 10 FL (ref 9.2–12.9)
POTASSIUM SERPL-SCNC: 4 MMOL/L (ref 3.5–5.1)
PULM VEIN S/D RATIO: 1.25
PV PEAK D VEL: 0.51 M/S
PV PEAK S VEL: 0.64 M/S
RA MAJOR: 4.28 CM
RA PRESSURE: 3 MMHG
RA WIDTH: 2.47 CM
RBC # BLD AUTO: 4.39 M/UL (ref 4–5.4)
RIGHT VENTRICULAR END-DIASTOLIC DIMENSION: 2.95 CM
SINUS: 2.18 CM
SODIUM SERPL-SCNC: 142 MMOL/L (ref 136–145)
STJ: 2.04 CM
T4 FREE SERPL-MCNC: 0.9 NG/DL (ref 0.71–1.51)
TDI LATERAL: 0.1 M/S
TDI SEPTAL: 0.09 M/S
TDI: 0.1 M/S
TR MAX PG: 34 MMHG
TRICUSPID ANNULAR PLANE SYSTOLIC EXCURSION: 2.04 CM
TSH SERPL DL<=0.005 MIU/L-ACNC: 0.87 UIU/ML (ref 0.4–4)
TV REST PULMONARY ARTERY PRESSURE: 37 MMHG
WBC # BLD AUTO: 9.05 K/UL (ref 3.9–12.7)

## 2019-08-14 PROCEDURE — 84100 ASSAY OF PHOSPHORUS: CPT

## 2019-08-14 PROCEDURE — 36415 COLL VENOUS BLD VENIPUNCTURE: CPT

## 2019-08-14 PROCEDURE — 84443 ASSAY THYROID STIM HORMONE: CPT

## 2019-08-14 PROCEDURE — 99217 PR OBSERVATION CARE DISCHARGE: CPT | Mod: ,,, | Performed by: PHYSICIAN ASSISTANT

## 2019-08-14 PROCEDURE — 83036 HEMOGLOBIN GLYCOSYLATED A1C: CPT

## 2019-08-14 PROCEDURE — 25000003 PHARM REV CODE 250: Performed by: PHYSICIAN ASSISTANT

## 2019-08-14 PROCEDURE — 84439 ASSAY OF FREE THYROXINE: CPT

## 2019-08-14 PROCEDURE — 80048 BASIC METABOLIC PNL TOTAL CA: CPT

## 2019-08-14 PROCEDURE — 85025 COMPLETE CBC W/AUTO DIFF WBC: CPT

## 2019-08-14 PROCEDURE — 94761 N-INVAS EAR/PLS OXIMETRY MLT: CPT

## 2019-08-14 PROCEDURE — 83735 ASSAY OF MAGNESIUM: CPT

## 2019-08-14 PROCEDURE — 99217 PR OBSERVATION CARE DISCHARGE: ICD-10-PCS | Mod: ,,, | Performed by: PHYSICIAN ASSISTANT

## 2019-08-14 PROCEDURE — G0378 HOSPITAL OBSERVATION PER HR: HCPCS

## 2019-08-14 RX ORDER — METOPROLOL SUCCINATE 25 MG/1
25 TABLET, EXTENDED RELEASE ORAL DAILY
Status: DISCONTINUED | OUTPATIENT
Start: 2019-08-15 | End: 2019-08-14 | Stop reason: HOSPADM

## 2019-08-14 RX ORDER — HYDRALAZINE HYDROCHLORIDE 25 MG/1
25 TABLET, FILM COATED ORAL 3 TIMES DAILY
Qty: 90 TABLET | Refills: 3 | Status: SHIPPED | OUTPATIENT
Start: 2019-08-14 | End: 2020-05-05

## 2019-08-14 RX ORDER — METOPROLOL SUCCINATE 25 MG/1
25 TABLET, EXTENDED RELEASE ORAL DAILY
Qty: 30 TABLET | Refills: 1 | Status: SHIPPED | OUTPATIENT
Start: 2019-08-14 | End: 2020-10-05

## 2019-08-14 RX ORDER — LISINOPRIL 10 MG/1
10 TABLET ORAL DAILY
Qty: 30 TABLET | Refills: 1 | Status: SHIPPED | OUTPATIENT
Start: 2019-08-14 | End: 2020-04-08

## 2019-08-14 RX ADMIN — AMLODIPINE BESYLATE 10 MG: 10 TABLET ORAL at 08:08

## 2019-08-14 RX ADMIN — LISINOPRIL 40 MG: 20 TABLET ORAL at 08:08

## 2019-08-14 RX ADMIN — HYDRALAZINE HYDROCHLORIDE 25 MG: 25 TABLET, FILM COATED ORAL at 08:08

## 2019-08-14 RX ADMIN — METOPROLOL SUCCINATE 100 MG: 50 TABLET, EXTENDED RELEASE ORAL at 08:08

## 2019-08-14 RX ADMIN — LEVOTHYROXINE SODIUM 25 MCG: 25 TABLET ORAL at 06:08

## 2019-08-14 RX ADMIN — NEPHROCAP 1 CAPSULE: 1 CAP ORAL at 08:08

## 2019-08-14 RX ADMIN — ASPIRIN 81 MG CHEWABLE TABLET 81 MG: 81 TABLET CHEWABLE at 08:08

## 2019-08-14 NOTE — PLAN OF CARE
08/14/19 0857   Post-Acute Status   Post-Acute Authorization Other   Other Status No Post-Acute Service Needs

## 2019-08-14 NOTE — HOSPITAL COURSE
Patient presented to the ED post syncopal episode and was admitted for further workup. Afebrile, but with slight WBC 16 on admission, suspect secondary to stress reaction given downtrend and unremarkable infectious workup. CTH no acute infarct or hemorrhage. Her TTE was unremarkable. TSH/FT4 WNL. Patient is stable for discharge with PCP follow up. Encouraged PO hydration, down titrated several BP medications given patient's BP has been normo-hypotensive during stay while holding BP medications. Return precautions discussed. No further questions at discharge.

## 2019-08-14 NOTE — PLAN OF CARE
Reviewed pt avs discharge papers. No questions noted. Pt has a follow up with Dr. Okeefe on 8/21 @ 1400. Medications sent to [facility] pharmacy on [address] Instructed to return to emergency room if symptoms worsen. Tele monitor removed and returned. Pt discharged home to self and family care.

## 2019-08-14 NOTE — PLAN OF CARE
Problem: Adult Inpatient Plan of Care  Goal: Plan of Care Review  Outcome: Ongoing (interventions implemented as appropriate)  Will continue to monitor pt's safety, and s/s of infection

## 2019-08-14 NOTE — DISCHARGE SUMMARY
"[facility] Hospital Medicine  Discharge Summary      Patient Name: Kristin Goodman  MRN: [id number]  Admission Date: 8/13/2019  Hospital Length of Stay: 0 days  Discharge Date and Time: 8/14/2019  2:59 PM  Attending Physician: Susu att. providers found   Discharging Provider: Naida Andrade PA-C  Primary Care Provider: Ilene Kan MD  Hospital Medicine Team: [facility] E Naida Andrade PA-C    HPI:   Kristin Goodman is a 74F with HTN, hypothyroidism, T2DM without insulin use, who presents to ED for witnessed syncopal episode. 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 Her co-worker instructed her to move to the other cooler room and sit down for a while. When she moved to the other room to sit down she vomited and then passed out. The next thing she remembers, her coworker was trying to get her to eat a peppermint. She cannot recall how long she was out. No loss of bowel or bladder, no CP prior to event. She did reportedly hit her head when she syncopized from the seated position. Of note, she says about a week ago she had a diarrheal illness that eventually resolved on its own. At the time of my exam she says she is feeling better but just feels generally weak and lightheaded. She denies fevers, HA, vision changes, dizziness, diarrhea.     Of note, pt had recent stress test and cardiac event monitor in 5/2019 that was unremarkable. Recent carotid US unremarkable. Recently increased hydralazine 25 mg TID to 50mg TID    In ED VSS. Afebrile with slight leukocytosis 16.6K. EKG no acute ischemic changes, trop WNL. CXR and UA unremarkable. CTH no acute infarct or hemorrhage.     * No surgery found *      Hospital Course:   Patient presented to the ED post syncopal episode and was admitted for further workup. Afebrile, but with slight WBC 16 on admission, suspect secondary to stress reaction given downtrend " and unremarkable infectious workup. CTH no acute infarct or hemorrhage. Her TTE was unremarkable. TSH/FT4 WNL. Patient is stable for discharge with PCP follow up. Encouraged PO hydration, down titrated several BP medications given patient's BP has been normo-hypotensive during stay while holding BP medications. Return precautions discussed. No further questions at discharge.      Consults:     * Syncope  Story sounds vasovagal in nature (occurred after sitting in the heat and vomiting), possibly BP medications contributing and volume depletion given recent diarrheal illness  - EKG non ischemic, trop WNL  - CTH unremarkable  - recent cardiac stress, cardiac event monitor, carotid US all unremarkable  - can repeat TTE: unremarkable  - slight leukocytosis 16.6: CXR clear, UA clean  - TSH/FT4 WNL  - encourage pt to move slowly when changing positions and stay hydrated; downtitrated BP medications and instructed pt to keep BP log for PCP to review    HTN (hypertension)  Labile in -180/59-82   Home regimen:    Norvasc 10mg daily; Hydralazine 50mg TID; Lisinopril 40mg daily; Toprol XL 100mg daily  -hold for now  Pt has been normo-hypotensive with all BP medications being held (114-131/57-70): at discharge discontinues norvasc, down titrated hydralazine from 50mg TID to 25mg TID, down-titrated Lisinopril form 40mg TID to 10mg TID and down-titrated Toprol Xl from 100mg to 25mg daily.     Hypothyroid  Continue synthroid  Check TSH and FT4: WNL        Final Active Diagnoses:    Diagnosis Date Noted POA    PRINCIPAL PROBLEM:  Syncope [R55] 08/13/2019 Yes    HTN (hypertension) [I10]  Yes    Hypothyroid [E03.9]  Yes      Problems Resolved During this Admission:       Discharged Condition: stable    Disposition: Home or Self Care    Follow Up:  Follow-up Information     Antoine Okeefe MD On 8/21/2019.    Specialty:  Family Medicine  Why:  at 2:00pm; hospital follow up appointment  Contact information:  [address]  [facility] [address]  [phone]                 Patient Instructions:      Notify your health care provider if you experience any of the following:  temperature >100.4     Notify your health care provider if you experience any of the following:  persistent nausea and vomiting or diarrhea     Notify your health care provider if you experience any of the following:  redness, tenderness, or signs of infection (pain, swelling, redness, odor or green/yellow discharge around incision site)     Notify your health care provider if you experience any of the following:  worsening rash     Notify your health care provider if you experience any of the following:  increased confusion or weakness     Notify your health care provider if you experience any of the following:  persistent dizziness, light-headedness, or visual disturbances     Activity as tolerated       Significant Diagnostic Studies: Labs:   Troponin   Recent Labs   Lab 08/13/19  1026   TROPONINI <0.006     Cardiac Graphics: Echocardiogram:   Transthoracic echo (TTE) complete (Cupid Only):   Results for orders placed or performed during the hospital encounter of 08/13/19   Transthoracic echo (TTE) 2D with Color Flow   Result Value Ref Range    Ascending aorta 2.32 cm    STJ 2.04 cm    AV mean gradient 9 mmHg    Ao peak tim 2.07 m/s    Ao VTI 41.56 cm    IVS 0.90 0.6 - 1.1 cm    LA size 4.12 cm    Left Atrium Major Axis 5.22 cm    Left Atrium Minor Axis 5.12 cm    LVIDD 3.82 3.5 - 6.0 cm    LVIDS 2.57 2.1 - 4.0 cm    LVOT diameter 1.87 cm    LVOT peak VTI 27.71 cm    PW 0.69 0.6 - 1.1 cm    MV Peak A Tim 1.18 m/s    E wave decelartion time 187.74 msec    MV Peak E Tim 0.93 m/s    PV Peak D Tim 0.51 m/s    PV Peak S Tim 0.64 m/s    RA Major Axis 4.28 cm    RA Width 2.47 cm    RVDD 2.95 cm    Sinus 2.18 cm    TAPSE 2.04 cm    TR Max Tim 2.92 m/s    TDI LATERAL 0.10 m/s    TDI SEPTAL 0.09 m/s    LA WIDTH 4.10 cm    LV Diastolic Volume 62.65 mL    LV Systolic  Volume 23.99 mL    LVOT peak hansel 1.33 m/s    LV LATERAL E/E' RATIO 9.30 m/s    LV SEPTAL E/E' RATIO 10.33 m/s    FS 33 %    LA volume 74.22 cm3    LV mass 85.96 g    Left Ventricle Relative Wall Thickness 0.36 cm    AV valve area 1.83 cm2    AV Velocity Ratio 0.64     AV index (prosthetic) 0.67     E/A ratio 0.79     Mean e' 0.10 m/s    Pulm vein S/D ratio 1.25     LVOT area 2.7 cm2    LVOT stroke volume 76.07 cm3    AV peak gradient 17 mmHg    E/E' ratio 9.79 m/s    LV Systolic Volume Index 13.4 mL/m2    LV Diastolic Volume Index 34.89 mL/m2    LA Volume Index 41.3 mL/m2    LV Mass Index 48 g/m2    Triscuspid Valve Regurgitation Peak Gradient 34 mmHg    BSA 1.84 m2    Right Atrial Pressure (from IVC) 3 mmHg    TV rest pulmonary artery pressure 37 mmHg    Narrative    · Mild left atrial enlargement.  · Normal left ventricular systolic function. The estimated ejection   fraction is 65%  · Indeterminate left ventricular diastolic function.  · Normal right ventricular systolic function.  · Normal central venous pressure (3 mm Hg).  · The estimated PA systolic pressure is 37 mm Hg          Pending Diagnostic Studies:     None         Medications:  Reconciled Home Medications:      Medication List      CHANGE how you take these medications    hydrALAZINE 25 MG tablet  Commonly known as:  APRESOLINE  Take 1 tablet (25 mg total) by mouth 3 (three) times daily.  What changed:  how much to take     lisinopril 10 MG tablet  Take 1 tablet (10 mg total) by mouth once daily.  What changed:    · medication strength  · how much to take     metoprolol succinate 25 MG 24 hr tablet  Commonly known as:  TOPROL-XL  Take 1 tablet (25 mg total) by mouth once daily.  What changed:    · medication strength  · how much to take        CONTINUE taking these medications    aspirin 81 MG Chew  Take 81 mg by mouth once daily.     baclofen 10 MG tablet  Commonly known as:  LIORESAL     epinastine 0.05 % ophthalmic solution     fish,bora,flax  oils-om3,6,9no1 1,200 mg Cap  Commonly known as:  OMEGA 3-6-9  Take 1 each by mouth once daily.     levothyroxine 25 MCG tablet  Commonly known as:  SYNTHROID  Take 1 tablet (25 mcg total) by mouth once daily.     TYLENOL ARTHRITIS PAIN ORAL  Take 1 tablet by mouth once daily.     vitamin renal formula (B-complex-vitamin c-folic acid) 1 mg Cap  Commonly known as:  TRIPHROCAPS  Take 1 capsule by mouth once daily.        STOP taking these medications    amLODIPine 10 MG tablet  Commonly known as:  NORVASC            Indwelling Lines/Drains at time of discharge:   Lines/Drains/Airways          None          Time spent on the discharge of patient: >35 minutes  Patient was seen and examined on the date of discharge and determined to be suitable for discharge.       Discussed with staff  Naida Andrade PA-C  Department of Hospital Medicine  [facility]

## 2019-08-14 NOTE — ASSESSMENT & PLAN NOTE
Continue synthroid  Check TSH and FT4    
Continue synthroid  Check TSH and FT4: WNL    
Labile in -180/59-82   Home regimen:    Norvasc 10mg daily; Hydralazine 50mg TID; Lisinopril 40mg daily; Toprol XL 100mg daily  - continue home medications for now; reduce hydralazing from 50mg TID to 25mg TID  
Labile in -180/59-82   Home regimen:    Norvasc 10mg daily; Hydralazine 50mg TID; Lisinopril 40mg daily; Toprol XL 100mg daily  -hold for now  Pt has been normo-hypotensive with all BP medications being held (114-131/57-70): at discharge discontinues norvasc, down titrated hydralazine from 50mg TID to 25mg TID, down-titrated Lisinopril form 40mg TID to 10mg TID and down-titrated Toprol Xl from 100mg to 25mg daily.   
Story sounds vasovagal in nature (occurred after sitting in the heat and vomiting), possibly BP medications contributing and volume depletion given recent diarrheal illness  - EKG non ischemic, trop WNL  - CTH unremarkable  - recent cardiac stress, cardiac event monitor, carotid US all unremarkable  - can repeat TTE  - slight leukocytosis 16.6: CXR clear, UA ordered  - TSH/FT4 ordered  - encourage pt to move slowly when changing positions  - tele  
Story sounds vasovagal in nature (occurred after sitting in the heat and vomiting), possibly BP medications contributing and volume depletion given recent diarrheal illness  - EKG non ischemic, trop WNL  - CTH unremarkable  - recent cardiac stress, cardiac event monitor, carotid US all unremarkable  - can repeat TTE: unremarkable  - slight leukocytosis 16.6: CXR clear, UA clean  - TSH/FT4 WNL  - encourage pt to move slowly when changing positions and stay hydrated; downtitrated BP medications and instructed pt to keep BP log for PCP to review  
irritation L/irritation R

## 2019-08-14 NOTE — PLAN OF CARE
08/14/19 0935   Discharge Assessment   Assessment Type Discharge Planning Assessment   Confirmed/corrected address and phone number on facesheet? Yes   Assessment information obtained from? Patient   Expected Length of Stay (days) 1   Communicated expected length of stay with patient/caregiver yes   Prior to hospitilization cognitive status: Alert/Oriented   Prior to hospitalization functional status: Independent   Current cognitive status: Alert/Oriented   Current Functional Status: Independent   Lives With child(arthur), adult  (daughter, Roro Goodman ([phone]))   Able to Return to Prior Arrangements yes   Is patient able to care for self after discharge? Yes   Patient's perception of discharge disposition home or selfcare   Readmission Within the Last 30 Days no previous admission in last 30 days   Patient currently being followed by outpatient case management? No   Patient currently receives any other outside agency services? No   Equipment Currently Used at Home other (see comments)  (BP machine)   Do you have any problems affording any of your prescribed medications? No   Is the patient taking medications as prescribed? yes   Does the patient have transportation home? Yes   Transportation Anticipated family or friend will provide  (daughter)   Does the patient receive services at the [facility]? No   Discharge Plan A Home with family   Discharge Plan B Home Health   DME Needed Upon Discharge  none   Patient/Family in Agreement with Plan yes     Dx: syncope  Pharm: [facility]  Hosp f/u appt: Dr. Antoine Okeefe on 8/21/19 at 1400    Patient scheduled to have a 2D echo done today.

## 2019-08-15 NOTE — PLAN OF CARE
08/15/19 1547   Final Note   Assessment Type Final Discharge Note     Patient discharged home with no needs on 8/14/19.

## 2019-08-21 ENCOUNTER — OFFICE VISIT (OUTPATIENT)
Dept: FAMILY MEDICINE | Facility: CLINIC | Age: 74
End: 2019-08-21
Payer: MEDICARE

## 2019-08-21 VITALS
DIASTOLIC BLOOD PRESSURE: 86 MMHG | OXYGEN SATURATION: 98 % | SYSTOLIC BLOOD PRESSURE: 138 MMHG | RESPIRATION RATE: 18 BRPM | BODY MASS INDEX: 29.69 KG/M2 | HEART RATE: 87 BPM | HEIGHT: 63 IN | WEIGHT: 167.56 LBS | TEMPERATURE: 98 F

## 2019-08-21 DIAGNOSIS — M46.90 INFLAMMATORY SPONDYLOPATHY, UNSPECIFIED SPINAL REGION: ICD-10-CM

## 2019-08-21 DIAGNOSIS — Z12.11 COLON CANCER SCREENING: ICD-10-CM

## 2019-08-21 DIAGNOSIS — R55 SYNCOPE, UNSPECIFIED SYNCOPE TYPE: Primary | ICD-10-CM

## 2019-08-21 PROCEDURE — 99214 PR OFFICE/OUTPT VISIT, EST, LEVL IV, 30-39 MIN: ICD-10-PCS | Mod: S$GLB,,, | Performed by: FAMILY MEDICINE

## 2019-08-21 PROCEDURE — 99499 RISK ADDL DX/OHS AUDIT: ICD-10-PCS | Mod: S$GLB,,, | Performed by: FAMILY MEDICINE

## 2019-08-21 PROCEDURE — 99999 PR PBB SHADOW E&M-EST. PATIENT-LVL IV: ICD-10-PCS | Mod: PBBFAC,,, | Performed by: FAMILY MEDICINE

## 2019-08-21 PROCEDURE — 99999 PR PBB SHADOW E&M-EST. PATIENT-LVL IV: CPT | Mod: PBBFAC,,, | Performed by: FAMILY MEDICINE

## 2019-08-21 PROCEDURE — 99499 UNLISTED E&M SERVICE: CPT | Mod: S$GLB,,, | Performed by: FAMILY MEDICINE

## 2019-08-21 PROCEDURE — 99214 OFFICE O/P EST MOD 30 MIN: CPT | Mod: S$GLB,,, | Performed by: FAMILY MEDICINE

## 2019-08-22 PROBLEM — M46.90 INFLAMMATORY SPONDYLOPATHY: Status: ACTIVE | Noted: 2019-08-22
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
## 2019-10-31 ENCOUNTER — PATIENT OUTREACH (OUTPATIENT)
Dept: ADMINISTRATIVE | Facility: OTHER | Age: 74
End: 2019-10-31

## 2019-10-31 DIAGNOSIS — Z12.31 BREAST CANCER SCREENING BY MAMMOGRAM: Primary | ICD-10-CM

## 2019-11-05 ENCOUNTER — OFFICE VISIT (OUTPATIENT)
Dept: CARDIOLOGY | Facility: CLINIC | Age: 74
End: 2019-11-05
Payer: MEDICARE

## 2019-11-05 VITALS
WEIGHT: 167.56 LBS | OXYGEN SATURATION: 98 % | HEART RATE: 100 BPM | SYSTOLIC BLOOD PRESSURE: 146 MMHG | DIASTOLIC BLOOD PRESSURE: 88 MMHG | RESPIRATION RATE: 16 BRPM | BODY MASS INDEX: 29.68 KG/M2

## 2019-11-05 DIAGNOSIS — I10 ESSENTIAL HYPERTENSION: Primary | ICD-10-CM

## 2019-11-05 DIAGNOSIS — I34.0 NONRHEUMATIC MITRAL VALVE REGURGITATION: ICD-10-CM

## 2019-11-05 PROCEDURE — 1101F PT FALLS ASSESS-DOCD LE1/YR: CPT | Mod: CPTII,S$GLB,, | Performed by: INTERNAL MEDICINE

## 2019-11-05 PROCEDURE — 3079F DIAST BP 80-89 MM HG: CPT | Mod: CPTII,S$GLB,, | Performed by: INTERNAL MEDICINE

## 2019-11-05 PROCEDURE — 99999 PR PBB SHADOW E&M-EST. PATIENT-LVL III: CPT | Mod: PBBFAC,,, | Performed by: INTERNAL MEDICINE

## 2019-11-05 PROCEDURE — 3077F SYST BP >= 140 MM HG: CPT | Mod: CPTII,S$GLB,, | Performed by: INTERNAL MEDICINE

## 2019-11-05 PROCEDURE — 3077F PR MOST RECENT SYSTOLIC BLOOD PRESSURE >= 140 MM HG: ICD-10-PCS | Mod: CPTII,S$GLB,, | Performed by: INTERNAL MEDICINE

## 2019-11-05 PROCEDURE — 1101F PR PT FALLS ASSESS DOC 0-1 FALLS W/OUT INJ PAST YR: ICD-10-PCS | Mod: CPTII,S$GLB,, | Performed by: INTERNAL MEDICINE

## 2019-11-05 PROCEDURE — 99499 RISK ADDL DX/OHS AUDIT: ICD-10-PCS | Mod: S$GLB,,, | Performed by: INTERNAL MEDICINE

## 2019-11-05 PROCEDURE — 3079F PR MOST RECENT DIASTOLIC BLOOD PRESSURE 80-89 MM HG: ICD-10-PCS | Mod: CPTII,S$GLB,, | Performed by: INTERNAL MEDICINE

## 2019-11-05 PROCEDURE — 99499 UNLISTED E&M SERVICE: CPT | Mod: S$GLB,,, | Performed by: INTERNAL MEDICINE

## 2019-11-05 PROCEDURE — 99999 PR PBB SHADOW E&M-EST. PATIENT-LVL III: ICD-10-PCS | Mod: PBBFAC,,, | Performed by: INTERNAL MEDICINE

## 2019-11-05 PROCEDURE — 99214 OFFICE O/P EST MOD 30 MIN: CPT | Mod: S$GLB,,, | Performed by: INTERNAL MEDICINE

## 2019-11-05 PROCEDURE — 99214 PR OFFICE/OUTPT VISIT, EST, LEVL IV, 30-39 MIN: ICD-10-PCS | Mod: S$GLB,,, | Performed by: INTERNAL MEDICINE
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
## 2019-12-12 ENCOUNTER — PATIENT OUTREACH (OUTPATIENT)
Dept: ADMINISTRATIVE | Facility: OTHER | Age: 74
End: 2019-12-12

## 2019-12-12 NOTE — PATIENT INSTRUCTIONS
Left message for patient to return call at [phone] to schedule overdue mammogram. She can also call [facility] at [phone] or [facility] at [phone] if she wishes to call and schedule it herself.

## 2020-02-08 RX ORDER — DICLOFENAC SODIUM 10 MG/G
GEL TOPICAL
Qty: 100 G | Refills: 0 | OUTPATIENT
Start: 2020-02-08

## 2020-02-19 ENCOUNTER — OFFICE VISIT (OUTPATIENT)
Dept: RHEUMATOLOGY | Facility: CLINIC | Age: 75
End: 2020-02-19
Payer: MEDICARE

## 2020-02-19 ENCOUNTER — PATIENT OUTREACH (OUTPATIENT)
Dept: ADMINISTRATIVE | Facility: OTHER | Age: 75
End: 2020-02-19

## 2020-02-19 VITALS
HEIGHT: 61 IN | DIASTOLIC BLOOD PRESSURE: 100 MMHG | WEIGHT: 159.63 LBS | SYSTOLIC BLOOD PRESSURE: 186 MMHG | BODY MASS INDEX: 30.14 KG/M2 | HEART RATE: 80 BPM

## 2020-02-19 DIAGNOSIS — M70.61 TROCHANTERIC BURSITIS OF RIGHT HIP: ICD-10-CM

## 2020-02-19 DIAGNOSIS — I10 ESSENTIAL HYPERTENSION: Primary | ICD-10-CM

## 2020-02-19 PROBLEM — N17.9 AKI (ACUTE KIDNEY INJURY): Status: RESOLVED | Noted: 2019-04-27 | Resolved: 2020-02-19

## 2020-02-19 PROCEDURE — 3077F PR MOST RECENT SYSTOLIC BLOOD PRESSURE >= 140 MM HG: ICD-10-PCS | Mod: CPTII,S$GLB,, | Performed by: INTERNAL MEDICINE

## 2020-02-19 PROCEDURE — 20610 DRAIN/INJ JOINT/BURSA W/O US: CPT | Mod: RT,S$GLB,, | Performed by: INTERNAL MEDICINE

## 2020-02-19 PROCEDURE — 99499 RISK ADDL DX/OHS AUDIT: ICD-10-PCS | Mod: S$GLB,,, | Performed by: INTERNAL MEDICINE

## 2020-02-19 PROCEDURE — 1125F AMNT PAIN NOTED PAIN PRSNT: CPT | Mod: S$GLB,,, | Performed by: INTERNAL MEDICINE

## 2020-02-19 PROCEDURE — 3080F PR MOST RECENT DIASTOLIC BLOOD PRESSURE >= 90 MM HG: ICD-10-PCS | Mod: CPTII,S$GLB,, | Performed by: INTERNAL MEDICINE

## 2020-02-19 PROCEDURE — 3080F DIAST BP >= 90 MM HG: CPT | Mod: CPTII,S$GLB,, | Performed by: INTERNAL MEDICINE

## 2020-02-19 PROCEDURE — 1101F PT FALLS ASSESS-DOCD LE1/YR: CPT | Mod: CPTII,S$GLB,, | Performed by: INTERNAL MEDICINE

## 2020-02-19 PROCEDURE — 1125F PR PAIN SEVERITY QUANTIFIED, PAIN PRESENT: ICD-10-PCS | Mod: S$GLB,,, | Performed by: INTERNAL MEDICINE

## 2020-02-19 PROCEDURE — 99213 PR OFFICE/OUTPT VISIT, EST, LEVL III, 20-29 MIN: ICD-10-PCS | Mod: 25,S$GLB,, | Performed by: INTERNAL MEDICINE

## 2020-02-19 PROCEDURE — 3077F SYST BP >= 140 MM HG: CPT | Mod: CPTII,S$GLB,, | Performed by: INTERNAL MEDICINE

## 2020-02-19 PROCEDURE — 20610 LARGE JOINT ASPIRATION/INJECTION: R GREATER TROCHANTERIC BURSA: ICD-10-PCS | Mod: RT,S$GLB,, | Performed by: INTERNAL MEDICINE

## 2020-02-19 PROCEDURE — 99999 PR PBB SHADOW E&M-EST. PATIENT-LVL III: ICD-10-PCS | Mod: PBBFAC,,, | Performed by: INTERNAL MEDICINE

## 2020-02-19 PROCEDURE — 1159F PR MEDICATION LIST DOCUMENTED IN MEDICAL RECORD: ICD-10-PCS | Mod: S$GLB,,, | Performed by: INTERNAL MEDICINE

## 2020-02-19 PROCEDURE — 1159F MED LIST DOCD IN RCRD: CPT | Mod: S$GLB,,, | Performed by: INTERNAL MEDICINE

## 2020-02-19 PROCEDURE — 99999 PR PBB SHADOW E&M-EST. PATIENT-LVL III: CPT | Mod: PBBFAC,,, | Performed by: INTERNAL MEDICINE

## 2020-02-19 PROCEDURE — 1101F PR PT FALLS ASSESS DOC 0-1 FALLS W/OUT INJ PAST YR: ICD-10-PCS | Mod: CPTII,S$GLB,, | Performed by: INTERNAL MEDICINE

## 2020-02-19 PROCEDURE — 99213 OFFICE O/P EST LOW 20 MIN: CPT | Mod: 25,S$GLB,, | Performed by: INTERNAL MEDICINE

## 2020-02-19 PROCEDURE — 99499 UNLISTED E&M SERVICE: CPT | Mod: S$GLB,,, | Performed by: INTERNAL MEDICINE

## 2020-02-19 RX ORDER — TRIAMCINOLONE ACETONIDE 40 MG/ML
40 INJECTION, SUSPENSION INTRA-ARTICULAR; INTRAMUSCULAR
Status: DISCONTINUED | OUTPATIENT
Start: 2020-02-19 | End: 2020-02-19 | Stop reason: HOSPADM

## 2020-02-19 RX ADMIN — TRIAMCINOLONE ACETONIDE 40 MG: 40 INJECTION, SUSPENSION INTRA-ARTICULAR; INTRAMUSCULAR at 04:02

## 2020-02-19 ASSESSMENT — ROUTINE ASSESSMENT OF PATIENT INDEX DATA (RAPID3)
TOTAL RAPID3 SCORE: 6
PSYCHOLOGICAL DISTRESS SCORE: 3.3
MDHAQ FUNCTION SCORE: .3
WHEN YOU AWAKENED IN THE MORNING OVER THE LAST WEEK, PLEASE INDICATE THE AMOUNT OF TIME IT TAKES UNTIL YOU ARE AS LIMBER AS YOU WILL BE FOR THE DAY: 1DAY
FATIGUE SCORE: 8.5
PATIENT GLOBAL ASSESSMENT SCORE: 8.5
PAIN SCORE: 8.5
AM STIFFNESS SCORE: 1, YES

## 2020-02-19 NOTE — ASSESSMENT & PLAN NOTE
BP elevated today  Patient reports she is taking meds though only one hydralazine  Daily    Patient instructed to call PCP office tomorrow to f/u BP

## 2020-02-19 NOTE — PROGRESS NOTES
"Subjective:       Patient ID: Kristin Goodman is a 74 y.o. female.    Chief Complaint: Disease Management    HPI     F/u OA    March 2019 I injected R trochanteric bursa  Seen urgently for pain in lower back and hip; similar to previous    Works with babies; sits on floor    Usually takes tylenol arthritis; not helping now    BP up today  Says she is taking lisinopril and metoprolol every morning  Says she reduced hydralazine to once / day because it made her woozy        Review of Systems      Objective:   BP (!) 186/100 (BP Location: Right arm, Patient Position: Lying, BP Method: Large (Manual))   Pulse 80   Ht 5' 1" (1.549 m)   Wt 72.4 kg (159 lb 9.8 oz)   BMI 30.16 kg/m²      Physical Exam    Repeat BP noted above    Very tender R gr troch      Assessment:       1. Essential hypertension    2. Trochanteric bursitis of right hip            Plan:       Problem List Items Addressed This Visit        Active Problems    HTN (hypertension) - Primary     BP elevated today  Patient reports she is taking meds though only one hydralazine  Daily    Patient instructed to call PCP office tomorrow to f/u BP         Trochanteric bursitis of right hip     Acutely worse    Will re-inject  Will refer to PT for exercises to prevent future episodes         Relevant Orders    Ambulatory referral/consult to Physical Therapy    Large Joint Aspiration/Injection: R greater trochanteric bursa            "

## 2020-02-19 NOTE — PROCEDURES
Large Joint Aspiration/Injection: R greater trochanteric bursa  Performed by: Woo Palacio MD  Authorized by: Woo Palacio MD  Date/Time: 2/19/2020 4:00 PM    Consent Done?:  Yes (Verbal)  Indications:  painProcedure site marked: Yes     Anesthesia  Local anesthesia used  Anesthetic: lidocaine 1% without epinephrine  Anesthetic total: 1mL    Details:   Needle size: 22 G   Approach: lateral  Location:  Hip  Site:  R greater trochanteric bursa    Medications: 40 mg triamcinolone acetonide 40 mg/mL  Aspirate:  0 mL  Patient tolerance:  patient tolerated the procedure well with no immediate complications

## 2020-02-20 RX ORDER — DICLOFENAC SODIUM 10 MG/G
GEL TOPICAL
Qty: 100 G | Refills: 0 | OUTPATIENT
Start: 2020-02-20

## 2020-03-13 DIAGNOSIS — E03.9 ACQUIRED HYPOTHYROIDISM: ICD-10-CM

## 2020-03-13 RX ORDER — LEVOTHYROXINE SODIUM 25 UG/1
TABLET ORAL
Qty: 90 TABLET | Refills: 0 | Status: SHIPPED | OUTPATIENT
Start: 2020-03-13 | End: 2020-06-09

## 2020-04-01 DIAGNOSIS — R07.9 CHEST PAIN, UNSPECIFIED TYPE: ICD-10-CM

## 2020-04-01 DIAGNOSIS — R06.09 DOE (DYSPNEA ON EXERTION): ICD-10-CM

## 2020-04-01 DIAGNOSIS — I10 UNCONTROLLED HYPERTENSION: ICD-10-CM

## 2020-04-01 DIAGNOSIS — I10 HYPERTENSION, UNSPECIFIED TYPE: ICD-10-CM

## 2020-04-01 RX ORDER — LISINOPRIL 40 MG/1
TABLET ORAL
Qty: 90 TABLET | Refills: 3 | Status: SHIPPED | OUTPATIENT
Start: 2020-04-01 | End: 2020-04-07 | Stop reason: SDUPTHER

## 2020-04-02 RX ORDER — B COMPLEX, C NO.20/FOLIC ACID 1 MG
CAPSULE ORAL
Qty: 90 EACH | Refills: 0 | Status: SHIPPED | OUTPATIENT
Start: 2020-04-02 | End: 2020-04-07 | Stop reason: SDUPTHER

## 2020-04-07 DIAGNOSIS — R06.09 DOE (DYSPNEA ON EXERTION): ICD-10-CM

## 2020-04-07 DIAGNOSIS — I10 UNCONTROLLED HYPERTENSION: ICD-10-CM

## 2020-04-07 DIAGNOSIS — R07.9 CHEST PAIN, UNSPECIFIED TYPE: ICD-10-CM

## 2020-04-07 DIAGNOSIS — I10 HYPERTENSION, UNSPECIFIED TYPE: ICD-10-CM

## 2020-04-07 RX ORDER — LISINOPRIL 40 MG/1
40 TABLET ORAL DAILY
Qty: 90 TABLET | Refills: 0 | Status: SHIPPED | OUTPATIENT
Start: 2020-04-07 | End: 2021-05-11

## 2020-04-07 NOTE — TELEPHONE ENCOUNTER
----- Message from Vicky Riggins sent at 4/7/2020 10:36 AM CDT -----  Contact: DERICK DESAI [[id number]]  Can the clinic reply in [facility]:  N      Please refill the medication(s) listed below. Please call the patient when the prescription(s) is ready for  at this phone number  [phone]      Medication #1 lisinopriL (PRINIVIL,ZESTRIL) 40 MG tablet    Medication #2 TRIPHROCAPS 1 mg Cap      Preferred Pharmacy: [id number] - [place], [place] - [address]

## 2020-05-05 RX ORDER — HYDRALAZINE HYDROCHLORIDE 25 MG/1
TABLET, FILM COATED ORAL
Qty: 360 TABLET | Refills: 0 | Status: SHIPPED | OUTPATIENT
Start: 2020-05-05 | End: 2020-07-31

## 2020-05-20 ENCOUNTER — TELEPHONE (OUTPATIENT)
Dept: FAMILY MEDICINE | Facility: CLINIC | Age: 75
End: 2020-05-20

## 2020-05-20 DIAGNOSIS — R05.9 COUGH: Primary | ICD-10-CM

## 2020-05-20 NOTE — TELEPHONE ENCOUNTER
----- Message from Ophelia Mann sent at 5/20/2020 10:53 AM CDT -----  Contact: DERICK DESAI   Name of Who is Calling: DERICK DESAI       What is the request in detail:  Patient is requesting orders for a covid-19 test she states she has been coughing but no fever she has not come in contact with anyone that has the virus       Can the clinic reply by [facility]: no      What Number to Call Back if not in [facility]:  [phone]

## 2020-05-21 ENCOUNTER — LAB VISIT (OUTPATIENT)
Dept: FAMILY MEDICINE | Facility: CLINIC | Age: 75
End: 2020-05-21
Payer: MEDICARE

## 2020-05-21 DIAGNOSIS — R05.9 COUGH: ICD-10-CM

## 2020-05-21 PROCEDURE — U0003 INFECTIOUS AGENT DETECTION BY NUCLEIC ACID (DNA OR RNA); SEVERE ACUTE RESPIRATORY SYNDROME CORONAVIRUS 2 (SARS-COV-2) (CORONAVIRUS DISEASE [COVID-19]), AMPLIFIED PROBE TECHNIQUE, MAKING USE OF HIGH THROUGHPUT TECHNOLOGIES AS DESCRIBED BY CMS-2020-01-R: HCPCS

## 2020-05-22 ENCOUNTER — TELEPHONE (OUTPATIENT)
Dept: FAMILY MEDICINE | Facility: CLINIC | Age: 75
End: 2020-05-22

## 2020-05-22 LAB — SARS-COV-2 RNA RESP QL NAA+PROBE: NOT DETECTED

## 2020-05-22 NOTE — TELEPHONE ENCOUNTER
----- Message from Arlene Torres PA-C sent at 5/22/2020  9:58 AM CDT -----  Negative for COVID virus

## 2020-07-02 ENCOUNTER — TELEPHONE (OUTPATIENT)
Dept: FAMILY MEDICINE | Facility: CLINIC | Age: 75
End: 2020-07-02

## 2020-07-02 DIAGNOSIS — Z12.11 COLON CANCER SCREENING: Primary | ICD-10-CM

## 2020-07-02 NOTE — TELEPHONE ENCOUNTER
----- Message from Trinity King sent at 7/2/2020  1:12 PM CDT -----  Regarding: Self/  [phone]  Type: Patient Call Back    Who called:  Patient    What is the request in detail:  Patient would like staff to give her a call.  She's wanting to reschedule her colonoscopy.  Thank you    Would the patient rather a call back or a response via [facility]?   Call back    Best call back number:  [phone]

## 2020-07-10 ENCOUNTER — HOSPITAL ENCOUNTER (OUTPATIENT)
Dept: RADIOLOGY | Facility: HOSPITAL | Age: 75
Discharge: HOME | End: 2020-07-10
Attending: FAMILY MEDICINE
Payer: MEDICARE

## 2020-07-10 DIAGNOSIS — Z12.31 BREAST CANCER SCREENING BY MAMMOGRAM: ICD-10-CM

## 2020-07-10 DIAGNOSIS — Z12.11 COLON CANCER SCREENING: ICD-10-CM

## 2020-07-10 PROCEDURE — 77067 SCR MAMMO BI INCL CAD: CPT | Mod: 26,,, | Performed by: RADIOLOGY

## 2020-07-10 PROCEDURE — 77067 SCR MAMMO BI INCL CAD: CPT | Mod: TC

## 2020-07-10 PROCEDURE — 77063 BREAST TOMOSYNTHESIS BI: CPT | Mod: 26,,, | Performed by: RADIOLOGY

## 2020-07-10 PROCEDURE — 77067 MAMMO DIGITAL SCREENING BILAT WITH TOMOSYNTHESIS_CAD: ICD-10-PCS | Mod: 26,,, | Performed by: RADIOLOGY

## 2020-07-10 PROCEDURE — 77063 MAMMO DIGITAL SCREENING BILAT WITH TOMOSYNTHESIS_CAD: ICD-10-PCS | Mod: 26,,, | Performed by: RADIOLOGY

## 2020-07-22 ENCOUNTER — HOSPITAL ENCOUNTER (OUTPATIENT)
Dept: RADIOLOGY | Facility: HOSPITAL | Age: 75
Discharge: HOME | End: 2020-07-22
Attending: FAMILY MEDICINE
Payer: MEDICARE

## 2020-07-22 DIAGNOSIS — R92.8 ABNORMAL MAMMOGRAM OF LEFT BREAST: ICD-10-CM

## 2020-07-22 PROCEDURE — 77061 BREAST TOMOSYNTHESIS UNI: CPT | Mod: 26,LT,, | Performed by: RADIOLOGY

## 2020-07-22 PROCEDURE — 77061 BREAST TOMOSYNTHESIS UNI: CPT | Mod: TC,LT

## 2020-07-22 PROCEDURE — 77065 DX MAMMO INCL CAD UNI: CPT | Mod: 26,LT,, | Performed by: RADIOLOGY

## 2020-07-22 PROCEDURE — 77065 MAMMO DIGITAL DIAGNOSTIC LEFT WITH TOMOSYNTHESIS_CAD: ICD-10-PCS | Mod: 26,LT,, | Performed by: RADIOLOGY

## 2020-07-22 PROCEDURE — 77065 DX MAMMO INCL CAD UNI: CPT | Mod: TC,LT

## 2020-07-22 PROCEDURE — 77061 MAMMO DIGITAL DIAGNOSTIC LEFT WITH TOMOSYNTHESIS_CAD: ICD-10-PCS | Mod: 26,LT,, | Performed by: RADIOLOGY

## 2020-07-31 ENCOUNTER — OFFICE VISIT (OUTPATIENT)
Dept: CARDIOLOGY | Facility: CLINIC | Age: 75
End: 2020-07-31
Payer: MEDICARE

## 2020-07-31 VITALS
SYSTOLIC BLOOD PRESSURE: 182 MMHG | RESPIRATION RATE: 16 BRPM | HEART RATE: 91 BPM | DIASTOLIC BLOOD PRESSURE: 102 MMHG | BODY MASS INDEX: 30.26 KG/M2 | OXYGEN SATURATION: 96 % | WEIGHT: 160.19 LBS

## 2020-07-31 DIAGNOSIS — I10 ESSENTIAL HYPERTENSION: ICD-10-CM

## 2020-07-31 DIAGNOSIS — I10 HYPERTENSION, UNSPECIFIED TYPE: Primary | ICD-10-CM

## 2020-07-31 DIAGNOSIS — I34.0 NONRHEUMATIC MITRAL VALVE REGURGITATION: ICD-10-CM

## 2020-07-31 DIAGNOSIS — R55 SYNCOPE, UNSPECIFIED SYNCOPE TYPE: ICD-10-CM

## 2020-07-31 PROCEDURE — 99499 RISK ADDL DX/OHS AUDIT: ICD-10-PCS | Mod: S$GLB,,, | Performed by: INTERNAL MEDICINE

## 2020-07-31 PROCEDURE — 3080F PR MOST RECENT DIASTOLIC BLOOD PRESSURE >= 90 MM HG: ICD-10-PCS | Mod: CPTII,S$GLB,, | Performed by: INTERNAL MEDICINE

## 2020-07-31 PROCEDURE — 1126F PR PAIN SEVERITY QUANTIFIED, NO PAIN PRESENT: ICD-10-PCS | Mod: S$GLB,,, | Performed by: INTERNAL MEDICINE

## 2020-07-31 PROCEDURE — 1159F MED LIST DOCD IN RCRD: CPT | Mod: S$GLB,,, | Performed by: INTERNAL MEDICINE

## 2020-07-31 PROCEDURE — 99214 OFFICE O/P EST MOD 30 MIN: CPT | Mod: S$GLB,,, | Performed by: INTERNAL MEDICINE

## 2020-07-31 PROCEDURE — 1101F PT FALLS ASSESS-DOCD LE1/YR: CPT | Mod: CPTII,S$GLB,, | Performed by: INTERNAL MEDICINE

## 2020-07-31 PROCEDURE — 3008F PR BODY MASS INDEX (BMI) DOCUMENTED: ICD-10-PCS | Mod: CPTII,S$GLB,, | Performed by: INTERNAL MEDICINE

## 2020-07-31 PROCEDURE — 99999 PR PBB SHADOW E&M-EST. PATIENT-LVL III: ICD-10-PCS | Mod: PBBFAC,,, | Performed by: INTERNAL MEDICINE

## 2020-07-31 PROCEDURE — 3008F BODY MASS INDEX DOCD: CPT | Mod: CPTII,S$GLB,, | Performed by: INTERNAL MEDICINE

## 2020-07-31 PROCEDURE — 1126F AMNT PAIN NOTED NONE PRSNT: CPT | Mod: S$GLB,,, | Performed by: INTERNAL MEDICINE

## 2020-07-31 PROCEDURE — 3080F DIAST BP >= 90 MM HG: CPT | Mod: CPTII,S$GLB,, | Performed by: INTERNAL MEDICINE

## 2020-07-31 PROCEDURE — 99999 PR PBB SHADOW E&M-EST. PATIENT-LVL III: CPT | Mod: PBBFAC,,, | Performed by: INTERNAL MEDICINE

## 2020-07-31 PROCEDURE — 3077F PR MOST RECENT SYSTOLIC BLOOD PRESSURE >= 140 MM HG: ICD-10-PCS | Mod: CPTII,S$GLB,, | Performed by: INTERNAL MEDICINE

## 2020-07-31 PROCEDURE — 99499 UNLISTED E&M SERVICE: CPT | Mod: S$GLB,,, | Performed by: INTERNAL MEDICINE

## 2020-07-31 PROCEDURE — 99214 PR OFFICE/OUTPT VISIT, EST, LEVL IV, 30-39 MIN: ICD-10-PCS | Mod: S$GLB,,, | Performed by: INTERNAL MEDICINE

## 2020-07-31 PROCEDURE — 1101F PR PT FALLS ASSESS DOC 0-1 FALLS W/OUT INJ PAST YR: ICD-10-PCS | Mod: CPTII,S$GLB,, | Performed by: INTERNAL MEDICINE

## 2020-07-31 PROCEDURE — 1159F PR MEDICATION LIST DOCUMENTED IN MEDICAL RECORD: ICD-10-PCS | Mod: S$GLB,,, | Performed by: INTERNAL MEDICINE

## 2020-07-31 PROCEDURE — 3077F SYST BP >= 140 MM HG: CPT | Mod: CPTII,S$GLB,, | Performed by: INTERNAL MEDICINE

## 2020-07-31 RX ORDER — HYDRALAZINE HYDROCHLORIDE 25 MG/1
100 TABLET, FILM COATED ORAL 3 TIMES DAILY
Qty: 90 TABLET | Refills: 3 | Status: SHIPPED | OUTPATIENT
Start: 2020-07-31 | End: 2020-08-31
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
## 2020-08-25 DIAGNOSIS — Z12.11 COLON CANCER SCREENING: Primary | ICD-10-CM

## 2020-08-28 ENCOUNTER — HOSPITAL ENCOUNTER (OUTPATIENT)
Dept: CARDIOLOGY | Facility: HOSPITAL | Age: 75
Discharge: HOME | End: 2020-08-28
Attending: INTERNAL MEDICINE
Payer: MEDICARE

## 2020-08-28 VITALS — BODY MASS INDEX: 30.21 KG/M2 | WEIGHT: 160 LBS | HEIGHT: 61 IN

## 2020-08-28 LAB
AORTIC ROOT ANNULUS: 2.25 CM
AORTIC VALVE CUSP SEPERATION: 1.84 CM
ASCENDING AORTA: 2.36 CM
AV INDEX (PROSTH): 0.61
AV MEAN GRADIENT: 6 MMHG
AV PEAK GRADIENT: 11 MMHG
AV VALVE AREA: 1.66 CM2
AV VELOCITY RATIO: 0.66
BSA FOR ECHO PROCEDURE: 1.77 M2
CV ECHO LV RWT: 0.44 CM
DOP CALC AO PEAK VEL: 1.65 M/S
DOP CALC AO VTI: 33.42 CM
DOP CALC LVOT AREA: 2.7 CM2
DOP CALC LVOT DIAMETER: 1.86 CM
DOP CALC LVOT PEAK VEL: 1.09 M/S
DOP CALC LVOT STROKE VOLUME: 55.56 CM3
DOP CALCLVOT PEAK VEL VTI: 20.46 CM
E WAVE DECELERATION TIME: 196.93 MSEC
E/A RATIO: 0.79
E/E' RATIO: 16.67 M/S
ECHO LV POSTERIOR WALL: 0.92 CM (ref 0.6–1.1)
FRACTIONAL SHORTENING: 30 % (ref 28–44)
INTERVENTRICULAR SEPTUM: 0.95 CM (ref 0.6–1.1)
IVRT: 86.51 MSEC
LA MAJOR: 4.65 CM
LA MINOR: 4.99 CM
LA WIDTH: 4.08 CM
LEFT ATRIUM SIZE: 3.65 CM
LEFT ATRIUM VOLUME INDEX: 35.5 ML/M2
LEFT ATRIUM VOLUME: 60.94 CM3
LEFT INTERNAL DIMENSION IN SYSTOLE: 2.9 CM (ref 2.1–4)
LEFT VENTRICLE DIASTOLIC VOLUME INDEX: 44.54 ML/M2
LEFT VENTRICLE DIASTOLIC VOLUME: 76.53 ML
LEFT VENTRICLE MASS INDEX: 71 G/M2
LEFT VENTRICLE SYSTOLIC VOLUME INDEX: 18.8 ML/M2
LEFT VENTRICLE SYSTOLIC VOLUME: 32.32 ML
LEFT VENTRICULAR INTERNAL DIMENSION IN DIASTOLE: 4.15 CM (ref 3.5–6)
LEFT VENTRICULAR MASS: 122.65 G
LV LATERAL E/E' RATIO: 14.29 M/S
LV SEPTAL E/E' RATIO: 20 M/S
MV PEAK A VEL: 1.27 M/S
MV PEAK E VEL: 1 M/S
MV STENOSIS PRESSURE HALF TIME: 57.11 MS
MV VALVE AREA P 1/2 METHOD: 3.85 CM2
PISA TR MAX VEL: 2.71 M/S
PV PEAK S VEL: 0.77 M/S
PV PEAK VELOCITY: 1.04 CM/S
RA MAJOR: 3.83 CM
RA WIDTH: 3.58 CM
RIGHT VENTRICULAR END-DIASTOLIC DIMENSION: 2.82 CM
RV TISSUE DOPPLER FREE WALL SYSTOLIC VELOCITY 1 (APICAL 4 CHAMBER VIEW): 16.73 CM/S
SINUS: 2.14 CM
STJ: 1.86 CM
TDI LATERAL: 0.07 M/S
TDI SEPTAL: 0.05 M/S
TDI: 0.06 M/S
TR MAX PG: 29 MMHG
TRICUSPID ANNULAR PLANE SYSTOLIC EXCURSION: 2.46 CM

## 2020-08-28 PROCEDURE — 93306 ECHO (CUPID ONLY): ICD-10-PCS | Mod: 26,,, | Performed by: INTERNAL MEDICINE

## 2020-08-28 PROCEDURE — 93306 TTE W/DOPPLER COMPLETE: CPT

## 2020-08-28 PROCEDURE — 93306 TTE W/DOPPLER COMPLETE: CPT | Mod: 26,,, | Performed by: INTERNAL MEDICINE

## 2020-08-31 ENCOUNTER — PATIENT OUTREACH (OUTPATIENT)
Dept: ADMINISTRATIVE | Facility: OTHER | Age: 75
End: 2020-08-31

## 2020-08-31 ENCOUNTER — OFFICE VISIT (OUTPATIENT)
Dept: CARDIOLOGY | Facility: CLINIC | Age: 75
End: 2020-08-31
Payer: MEDICARE

## 2020-08-31 VITALS
RESPIRATION RATE: 16 BRPM | HEART RATE: 102 BPM | WEIGHT: 160.94 LBS | OXYGEN SATURATION: 98 % | BODY MASS INDEX: 30.39 KG/M2 | HEIGHT: 61 IN | SYSTOLIC BLOOD PRESSURE: 166 MMHG | DIASTOLIC BLOOD PRESSURE: 78 MMHG

## 2020-08-31 DIAGNOSIS — I34.0 NONRHEUMATIC MITRAL VALVE REGURGITATION: ICD-10-CM

## 2020-08-31 DIAGNOSIS — I10 ESSENTIAL HYPERTENSION: ICD-10-CM

## 2020-08-31 DIAGNOSIS — R55 SYNCOPE, UNSPECIFIED SYNCOPE TYPE: ICD-10-CM

## 2020-08-31 DIAGNOSIS — I27.20 PULMONARY HTN: ICD-10-CM

## 2020-08-31 DIAGNOSIS — I50.32 CHRONIC DIASTOLIC HEART FAILURE: ICD-10-CM

## 2020-08-31 DIAGNOSIS — R06.09 DOE (DYSPNEA ON EXERTION): Primary | ICD-10-CM

## 2020-08-31 PROCEDURE — 3078F PR MOST RECENT DIASTOLIC BLOOD PRESSURE < 80 MM HG: ICD-10-PCS | Mod: CPTII,S$GLB,, | Performed by: INTERNAL MEDICINE

## 2020-08-31 PROCEDURE — 1125F AMNT PAIN NOTED PAIN PRSNT: CPT | Mod: S$GLB,,, | Performed by: INTERNAL MEDICINE

## 2020-08-31 PROCEDURE — 99215 PR OFFICE/OUTPT VISIT, EST, LEVL V, 40-54 MIN: ICD-10-PCS | Mod: S$GLB,,, | Performed by: INTERNAL MEDICINE

## 2020-08-31 PROCEDURE — 3077F SYST BP >= 140 MM HG: CPT | Mod: CPTII,S$GLB,, | Performed by: INTERNAL MEDICINE

## 2020-08-31 PROCEDURE — 1101F PR PT FALLS ASSESS DOC 0-1 FALLS W/OUT INJ PAST YR: ICD-10-PCS | Mod: CPTII,S$GLB,, | Performed by: INTERNAL MEDICINE

## 2020-08-31 PROCEDURE — 3078F DIAST BP <80 MM HG: CPT | Mod: CPTII,S$GLB,, | Performed by: INTERNAL MEDICINE

## 2020-08-31 PROCEDURE — 99499 RISK ADDL DX/OHS AUDIT: ICD-10-PCS | Mod: S$GLB,,, | Performed by: INTERNAL MEDICINE

## 2020-08-31 PROCEDURE — 3008F PR BODY MASS INDEX (BMI) DOCUMENTED: ICD-10-PCS | Mod: CPTII,S$GLB,, | Performed by: INTERNAL MEDICINE

## 2020-08-31 PROCEDURE — 99499 UNLISTED E&M SERVICE: CPT | Mod: S$GLB,,, | Performed by: INTERNAL MEDICINE

## 2020-08-31 PROCEDURE — 1125F PR PAIN SEVERITY QUANTIFIED, PAIN PRESENT: ICD-10-PCS | Mod: S$GLB,,, | Performed by: INTERNAL MEDICINE

## 2020-08-31 PROCEDURE — 1159F PR MEDICATION LIST DOCUMENTED IN MEDICAL RECORD: ICD-10-PCS | Mod: S$GLB,,, | Performed by: INTERNAL MEDICINE

## 2020-08-31 PROCEDURE — 1101F PT FALLS ASSESS-DOCD LE1/YR: CPT | Mod: CPTII,S$GLB,, | Performed by: INTERNAL MEDICINE

## 2020-08-31 PROCEDURE — 99215 OFFICE O/P EST HI 40 MIN: CPT | Mod: S$GLB,,, | Performed by: INTERNAL MEDICINE

## 2020-08-31 PROCEDURE — 99999 PR PBB SHADOW E&M-EST. PATIENT-LVL IV: ICD-10-PCS | Mod: PBBFAC,,, | Performed by: INTERNAL MEDICINE

## 2020-08-31 PROCEDURE — 99999 PR PBB SHADOW E&M-EST. PATIENT-LVL IV: CPT | Mod: PBBFAC,,, | Performed by: INTERNAL MEDICINE

## 2020-08-31 PROCEDURE — 1159F MED LIST DOCD IN RCRD: CPT | Mod: S$GLB,,, | Performed by: INTERNAL MEDICINE

## 2020-08-31 PROCEDURE — 3077F PR MOST RECENT SYSTOLIC BLOOD PRESSURE >= 140 MM HG: ICD-10-PCS | Mod: CPTII,S$GLB,, | Performed by: INTERNAL MEDICINE

## 2020-08-31 PROCEDURE — 3008F BODY MASS INDEX DOCD: CPT | Mod: CPTII,S$GLB,, | Performed by: INTERNAL MEDICINE

## 2020-08-31 RX ORDER — HYDRALAZINE HYDROCHLORIDE 100 MG/1
100 TABLET, FILM COATED ORAL 3 TIMES DAILY
Qty: 90 TABLET | Refills: 3 | Status: SHIPPED | OUTPATIENT
Start: 2020-08-31 | End: 2021-05-07

## 2020-08-31 RX ORDER — AMLODIPINE BESYLATE 5 MG/1
5 TABLET ORAL DAILY
Qty: 90 TABLET | Refills: 3 | Status: SHIPPED | OUTPATIENT
Start: 2020-08-31 | End: 2021-11-08
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
## 2020-08-31 NOTE — PROGRESS NOTES
Health Maintenance Due   Topic Date Due    Shingles Vaccine (1 of 2) 04/07/1997     Updates were requested from care everywhere.  Chart was reviewed for overdue Proactive [facility] Encounters ([facility]) topics (CRS, Breast Cancer Screening, Eye exam)  Health Maintenance has been updated.  LINKS immunization registry triggered.  Immunizations were reconciled.

## 2020-09-10 DIAGNOSIS — E03.9 ACQUIRED HYPOTHYROIDISM: ICD-10-CM

## 2020-09-10 DIAGNOSIS — I10 ESSENTIAL HYPERTENSION: Primary | ICD-10-CM

## 2020-09-10 RX ORDER — LEVOTHYROXINE SODIUM 25 UG/1
TABLET ORAL
Qty: 90 TABLET | Refills: 0 | Status: SHIPPED | OUTPATIENT
Start: 2020-09-10 | End: 2020-12-03

## 2020-09-25 ENCOUNTER — LAB VISIT (OUTPATIENT)
Dept: FAMILY MEDICINE | Facility: CLINIC | Age: 75
End: 2020-09-25
Payer: MEDICARE

## 2020-09-25 DIAGNOSIS — Z12.11 COLON CANCER SCREENING: ICD-10-CM

## 2020-09-25 PROCEDURE — U0003 INFECTIOUS AGENT DETECTION BY NUCLEIC ACID (DNA OR RNA); SEVERE ACUTE RESPIRATORY SYNDROME CORONAVIRUS 2 (SARS-COV-2) (CORONAVIRUS DISEASE [COVID-19]), AMPLIFIED PROBE TECHNIQUE, MAKING USE OF HIGH THROUGHPUT TECHNOLOGIES AS DESCRIBED BY CMS-2020-01-R: HCPCS

## 2020-09-26 LAB — SARS-COV-2 RNA RESP QL NAA+PROBE: NOT DETECTED

## 2020-09-28 ENCOUNTER — ANESTHESIA EVENT (OUTPATIENT)
Dept: ENDOSCOPY | Facility: HOSPITAL | Age: 75
End: 2020-09-28
Payer: MEDICARE

## 2020-09-28 ENCOUNTER — HOSPITAL ENCOUNTER (OUTPATIENT)
Facility: HOSPITAL | Age: 75
Discharge: HOME | End: 2020-09-28
Attending: INTERNAL MEDICINE | Admitting: INTERNAL MEDICINE
Payer: MEDICARE

## 2020-09-28 ENCOUNTER — ANESTHESIA (OUTPATIENT)
Dept: ENDOSCOPY | Facility: HOSPITAL | Age: 75
End: 2020-09-28
Payer: MEDICARE

## 2020-09-28 VITALS
SYSTOLIC BLOOD PRESSURE: 169 MMHG | DIASTOLIC BLOOD PRESSURE: 76 MMHG | OXYGEN SATURATION: 99 % | RESPIRATION RATE: 20 BRPM | HEIGHT: 61 IN | TEMPERATURE: 98 F | HEART RATE: 80 BPM | WEIGHT: 158 LBS | BODY MASS INDEX: 29.83 KG/M2

## 2020-09-28 DIAGNOSIS — Z12.11 COLON CANCER SCREENING: ICD-10-CM

## 2020-09-28 PROCEDURE — 25000003 PHARM REV CODE 250: Performed by: STUDENT IN AN ORGANIZED HEALTH CARE EDUCATION/TRAINING PROGRAM

## 2020-09-28 PROCEDURE — 27201089 HC SNARE, DISP (ANY): Performed by: INTERNAL MEDICINE

## 2020-09-28 PROCEDURE — 88305 TISSUE EXAM BY PATHOLOGIST: CPT | Performed by: PATHOLOGY

## 2020-09-28 PROCEDURE — D9220A PRA ANESTHESIA: ICD-10-PCS | Mod: PT,ANES,, | Performed by: ANESTHESIOLOGY

## 2020-09-28 PROCEDURE — 45385 COLONOSCOPY W/LESION REMOVAL: CPT | Performed by: INTERNAL MEDICINE

## 2020-09-28 PROCEDURE — 88305 TISSUE EXAM BY PATHOLOGIST: CPT | Mod: 26,,, | Performed by: PATHOLOGY

## 2020-09-28 PROCEDURE — 37000009 HC ANESTHESIA EA ADD 15 MINS: Performed by: INTERNAL MEDICINE

## 2020-09-28 PROCEDURE — D9220A PRA ANESTHESIA: Mod: PT,ANES,, | Performed by: ANESTHESIOLOGY

## 2020-09-28 PROCEDURE — 45385 PR COLONOSCOPY,REMV LESN,SNARE: ICD-10-PCS | Mod: PT,,, | Performed by: INTERNAL MEDICINE

## 2020-09-28 PROCEDURE — 63600175 PHARM REV CODE 636 W HCPCS: Performed by: STUDENT IN AN ORGANIZED HEALTH CARE EDUCATION/TRAINING PROGRAM

## 2020-09-28 PROCEDURE — 88305 TISSUE EXAM BY PATHOLOGIST: ICD-10-PCS | Mod: 26,,, | Performed by: PATHOLOGY

## 2020-09-28 PROCEDURE — 37000008 HC ANESTHESIA 1ST 15 MINUTES: Performed by: INTERNAL MEDICINE

## 2020-09-28 PROCEDURE — D9220A PRA ANESTHESIA: Mod: PT,CRNA,, | Performed by: STUDENT IN AN ORGANIZED HEALTH CARE EDUCATION/TRAINING PROGRAM

## 2020-09-28 PROCEDURE — 45385 COLONOSCOPY W/LESION REMOVAL: CPT | Mod: PT,,, | Performed by: INTERNAL MEDICINE

## 2020-09-28 PROCEDURE — D9220A PRA ANESTHESIA: ICD-10-PCS | Mod: PT,CRNA,, | Performed by: STUDENT IN AN ORGANIZED HEALTH CARE EDUCATION/TRAINING PROGRAM

## 2020-09-28 RX ORDER — LIDOCAINE HYDROCHLORIDE 20 MG/ML
INJECTION INTRAVENOUS
Status: DISCONTINUED | OUTPATIENT
Start: 2020-09-28 | End: 2020-09-28

## 2020-09-28 RX ORDER — LIDOCAINE HYDROCHLORIDE 10 MG/ML
1 INJECTION, SOLUTION EPIDURAL; INFILTRATION; INTRACAUDAL; PERINEURAL ONCE
Status: CANCELLED | OUTPATIENT
Start: 2020-09-28 | End: 2020-09-28

## 2020-09-28 RX ORDER — PROPOFOL 10 MG/ML
INJECTION, EMULSION INTRAVENOUS
Status: DISCONTINUED
Start: 2020-09-28 | End: 2020-09-28 | Stop reason: HOSPADM

## 2020-09-28 RX ORDER — SODIUM CHLORIDE 9 MG/ML
INJECTION, SOLUTION INTRAVENOUS CONTINUOUS
Status: CANCELLED | OUTPATIENT
Start: 2020-09-28

## 2020-09-28 RX ORDER — PROPOFOL 10 MG/ML
VIAL (ML) INTRAVENOUS
Status: DISCONTINUED | OUTPATIENT
Start: 2020-09-28 | End: 2020-09-28

## 2020-09-28 RX ORDER — LIDOCAINE HYDROCHLORIDE 20 MG/ML
INJECTION, SOLUTION EPIDURAL; INFILTRATION; INTRACAUDAL; PERINEURAL
Status: DISCONTINUED
Start: 2020-09-28 | End: 2020-09-28 | Stop reason: HOSPADM

## 2020-09-28 RX ORDER — SODIUM CHLORIDE 9 MG/ML
INJECTION, SOLUTION INTRAVENOUS CONTINUOUS PRN
Status: DISCONTINUED | OUTPATIENT
Start: 2020-09-28 | End: 2020-09-28

## 2020-09-28 RX ADMIN — SODIUM CHLORIDE: 9 INJECTION, SOLUTION INTRAVENOUS at 09:09

## 2020-09-28 RX ADMIN — PROPOFOL 20 MG: 10 INJECTION, EMULSION INTRAVENOUS at 10:09

## 2020-09-28 RX ADMIN — PROPOFOL 40 MG: 10 INJECTION, EMULSION INTRAVENOUS at 10:09

## 2020-09-28 RX ADMIN — PROPOFOL 40 MG: 10 INJECTION, EMULSION INTRAVENOUS at 09:09

## 2020-09-28 RX ADMIN — PROPOFOL 20 MG: 10 INJECTION, EMULSION INTRAVENOUS at 09:09

## 2020-09-28 RX ADMIN — Medication 100 MG: at 09:09

## 2020-09-28 RX ADMIN — PROPOFOL 100 MG: 10 INJECTION, EMULSION INTRAVENOUS at 09:09

## 2020-09-28 NOTE — PLAN OF CARE
Procedure complete. Awake and alert. Daughter at bedside. Discharge instructions given. Verbalized understanding. No acute distress noted.

## 2020-09-28 NOTE — DISCHARGE INSTRUCTIONS
Hemorrhoids    Hemorrhoids are swollen and inflamed veins inside the rectum and near the anus. The rectum is the last several inches of the colon. The anus is the passage between the rectum and the outside of the body.  Causes  The veins can become swollen due to increased pressure in them. This is most often caused by:  · Chronic constipation or diarrhea  · Straining when having a bowel movement  · Sitting too long on the toilet  · A low-fiber diet  · Pregnancy  Symptoms  · Bleeding from the rectum (this may be noticeable after bowel movements)  · Lump near the anus  · Itching around the anus  · Pain around the anus  There are different types of hemorrhoids. Depending on the type you have and the severity, you may be able to treat yourself at home. In some cases, a procedure may be the best treatment option. Your healthcare provider can tell you more about this, if needed.  Home care  General care  · To get relief from pain or itching, try:  ¨ Topical products. Your healthcare provider may prescribe or recommend creams, ointments, or pads that can be applied to the hemorrhoid. Use these exactly as directed.  ¨ Medicines. Your healthcare provider may recommend stool softeners, suppositories, or laxatives to help manage constipation. Use these exactly as directed.  ¨ Sitz baths. A sitz bath involves sitting in a few inches of warm bath water. Be careful not to make the water so hot that you burn yourself--test it before sitting in it. Soak for about 10 to 15 minutes a few times a day. This may help relieve pain.  Tips to help prevent hemorrhoids  · Eat more fiber. Fiber adds bulk to stool and absorbs water as it moves through your colon. This makes stool softer and easier to pass.  ¨ Increase the fiber in your diet with more fiber-rich foods. These include fresh fruit, vegetables, and whole grains.  ¨ Take a fiber supplement or bulking agent, if advised to by your provider. These include products such as psyllium  or methylcellulose.  · Drink plenty of water, if directed to by your provider. This can help keep stool soft.  · Be more active. Frequent exercise aids digestion and helps prevent constipation. It may also help make bowel movements more regular.  · Dont strain during bowel movements. This can make hemorrhoids more likely. Also, dont sit on the toilet for long periods of time.  Follow-up care  Follow up with your healthcare provider, or as advised. If a culture or imaging tests were done, you will be notified of the results when they are ready. This may take a few days or longer.  When to seek medical advice  Call your healthcare provider right away if any of these occur:  · Increased bleeding from the rectum  · Increased pain around the rectum or anus  · Weakness or dizziness  Call 911  Call 911 or return to the emergency department right away if any of these occur:  · Trouble breathing or swallowing  · Fainting or loss of consciousness  · Unusually fast heart rate  · Vomiting blood  · Large amounts of blood in stool  Date Last Reviewed: 6/22/2015 © 2000-2017 [employer]. [address] [place] [address]. All rights reserved. This information is not intended as a substitute for professional medical care. Always follow your healthcare professional's instructions.        High-Fiber Diet  Fiber is in fruits, vegetables, cereals, and grains. Fiber passes through your body undigested. A high-fiber diet helps food move through your intestinal tract. The added bulk is helpful in preventing constipation. In people with diverticulosis, fiber helps clean out the pouches along the colon wall. It also prevents new pouches from forming. A high-fiber diet reduces the risk of colon cancer. It also lowers blood cholesterol and prevents high blood sugar in people with diabetes.    The fiber-rich foods listed below should be part of your diet. If you are not used to high-fiber foods, start with 1 or 2 foods  from this list. Every 3 to 4 days add a new one to your diet. Do this until you are eating 4 high-fiber foods per day. This should give you 20 to 35 grams of fiber a day. It is also important to drink a lot of water when you are on this diet. You should have 6 to 8 glasses of water a day. Water makes the fiber swell and increases the benefit.  Foods high in dietary fiber  The following foods are high in dietary fiber:  · Breads. Breads made with 100% whole-wheat flour; shiv, wheat, or rye crackers; whole-grain tortillas, bran muffins.  · Cereals. Whole-grain and bran cereals with bran (shredded wheat, wheat flakes, raisin bran, corn bran); oatmeal, rolled oats, granola, and brown rice.  · Fruits. Fresh fruits and their edible skins (pears, prunes, raisins, berries, apples, and apricots); bananas, citrus fruit, mangoes, pineapple; and prune juice.  · Nuts. Any nuts and seeds.  · Vegetables. Best served raw or lightly cooked. All types, especially: green peas, celery, eggplant, potatoes, spinach, broccoli, [place] sprouts, winter squash, carrots, cauliflower, soybeans, lentils, and fresh and dried beans of all kinds.  · Other. Popcorn, any spices.  Date Last Reviewed: 8/1/2016  © 5468-8865 "[employer]. [address] [place] [address], [place] [address]. All rights reserved. This information is not intended as a substitute for professional medical care. Always follow your healthcare professional's instructions.        Diverticulosis    Diverticulosis means that small pouches have formed in the wall of your large intestine (colon). Most often, this problem causes no symptoms and is common as people age. But the pouches in the colon are at risk of becoming infected. When this happens, the condition is called diverticulitis. Although most people with diverticulosis never develop diverticulitis, it is still not uncommon. Rectal bleeding can also occur and in less common situations, a type of colon inflammation  called colitis.  While most people do not have symptoms, some people with diverticulosis may have:  · Abdominal cramps and pain  · Bloating  · Constipation  · Change in bowel habits  Causes  The exact cause of diverticulosis (and diverticulitis) has not been proved, but a few things are associated with the condition:  · Low-fiber diet  · Constipation  · Lack of exercise  Your healthcare provider will talk with you about how to manage your condition. Diet changes may be all that are needed to help control diverticulosis and prevent progression to diverticulitis. If you develop diverticulitis, you will likely need other treatments.  Home care  You may be told to take fiber supplements daily. Fiber adds bulk to the stool so that it passes through the colon more easily. Stool softeners may be recommended. You may also be given medications for pain relief. Be sure to take all medications as directed.  In the past, people were told to avoid corn, nuts, and seeds. This is no longer necessary.  Follow these guidelines when caring for yourself at home:  · Eat unprocessed foods that are high in fiber. Whole grains, fruits, and vegetables are good choices.  · Drink 6 to 8 glasses of water every day unless your healthcare provider has you limit how much fluid you should have.  · Watch for changes in your bowel movements. Tell your provider if you notice any changes.  · Begin an exercise program. Ask your provider how to get started. Generally, walking is the best.  · Get plenty of rest and sleep.  Follow-up care  Follow up with your healthcare provider, or as advised. Regular visits may be needed to check on your health. Sometimes special procedures such as colonoscopy, are needed after an episode of diverticulitis or blooding. Be sure to keep all your appointments.  If a stool sample was taken, or cultures were done, you should be told if they are positive, or if your treatment needs to be changed. You can call as directed for  the results.  If X-rays were done, a radiologist will look at them. You will be told if there is a change in your treatment.  If antibiotics were prescribed, be sure to finish them all.  When to seek medical advice  Call your healthcare provider right away if any of these occur:  · Fever of 100.4°F (38°C) or higher, or as directed by your healthcare provider  · Severe cramps in the lower left side of the abdomen or pain that is getting worse  · Tenderness in the lower left side of the abdomen or worsening pain throughout the abdomen  · Diarrhea or constipation that doesn't get better within 24 hours  · Nausea and vomiting  · Bleeding from the rectum  Call 911  Call emergency services if any of the following occur:  · Trouble breathing  · Confusion  · Very drowsy or trouble awakening  · Fainting or loss of consciousness  · Rapid heart rate  · Chest pain  Date Last Reviewed: 12/30/2015  © 7916-8038 [employer]. [address] [place] [address]. All rights reserved. This information is not intended as a substitute for professional medical care. Always follow your healthcare professional's instructions.        Understanding Colon and Rectal Polyps    The colon (also called the large intestine) is a muscular tube that forms the last part of the digestive tract. It absorbs water and stores food waste. The colon is about 4 to 6 feet long. The rectum is the last 6 inches of the colon. The colon and rectum have a smooth lining composed of millions of cells. Changes in these cells can lead to growths in the colon that can become cancerous and should be removed. Multiple tests are available to screen for colon cancer, but the colonoscopy is the most recommended test. During colonoscopy, these polyps can be removed. How often you need this test depends on many things including your condition, your family history, symptoms, and what the findings were at the previous colonoscopy.   When the colon lining  changes  Changes that happen in the cells that line the colon or rectum can lead to growths called polyps. Over a period of years, polyps can turn cancerous. Removing polyps early may prevent cancer from ever forming.  Polyps  Polyps are fleshy clumps of tissue that form on the lining of the colon or rectum. Small polyps are usually benign (not cancerous). However, over time, cells in a polyp can change and become cancerous. Certain types of polyps known as adenomatous polyps are premalignant. The risk for invasive cancer increases with the size of the polyp and certain cell and gene features. This means that they can become cancerous if they're not removed. Hyperplastic polyps are benign. They can grow quite large and not turn cancerous.   Cancer  Almost all colorectal cancers start when polyp cells begin growing abnormally. As a cancerous tumor grows, it may involve more and more of the colon or rectum. In time, cancer can also grow beyond the colon or rectum and spread to nearby organs or to glands called lymph nodes. The cells can also travel to other parts of the body. This is known as metastasis. The earlier a cancerous tumor is removed, the better the chance of preventing its spread.    Date Last Reviewed: 8/1/2016  © 1713-7386 [employer]. [address] [place] [address], [place] [address]. All rights reserved. This information is not intended as a substitute for professional medical care. Always follow your healthcare professional's instructions.

## 2020-09-28 NOTE — ANESTHESIA PREPROCEDURE EVALUATION
09/28/2020  Kristin Goodman is a 75 y.o., female.    Anesthesia Evaluation    I have reviewed the Patient Summary Reports.    I have reviewed the Nursing Notes.       Review of Systems  Anesthesia Hx:  No problems with previous Anesthesia    Social:  Former Smoker    Cardiovascular:   Hypertension Valvular problems/Murmurs, MR Denies MI.   CHF CAREY 8/28/2020 Echo: EF 65%; mild/mod MR; grade I diastolic dysfunction    Last saw cardiology in Aug 2020   Pulmonary:   Shortness of breath    Hepatic/GI:   Bowel Prep. Denies PUD. Denies Hiatal Hernia.  Denies GERD. Denies Liver Disease.  Denies Hepatitis.    Musculoskeletal:   Arthritis     Neurological:   H/o Bell's palsy   Endocrine:   Denies Diabetes. Hypothyroidism        Physical Exam  General:  Well nourished, Obesity    Airway/Jaw/Neck:  AIRWAY FINDINGS: Normal      Chest/Lungs:  Chest/Lungs Clear    Heart/Vascular:  Heart Findings: Normal       Mental Status:  Mental Status Findings: Normal      Wt Readings from Last 3 Encounters:   08/31/20 73 kg (160 lb 15 oz)   08/28/20 72.6 kg (160 lb)   07/31/20 72.7 kg (160 lb 2.6 oz)     Temp Readings from Last 3 Encounters:   08/21/19 36.8 °C (98.2 °F) (Oral)   08/14/19 36.8 °C (98.3 °F) (Oral)   04/28/19 36.8 °C (98.3 °F) (Oral)     BP Readings from Last 3 Encounters:   08/31/20 (!) 166/78   07/31/20 (!) 182/102   02/19/20 (!) 186/100     Pulse Readings from Last 3 Encounters:   08/31/20 102   07/31/20 91   02/19/20 80     9/25/2020 COVID negative    Anesthesia Plan  Type of Anesthesia, risks & benefits discussed:  Anesthesia Type:  general, MAC  Patient's Preference:   Intra-op Monitoring Plan: standard ASA monitors  Intra-op Monitoring Plan Comments:   Post Op Pain Control Plan:   Post Op Pain Control Plan Comments:   Induction:   IV  Beta Blocker:  Patient is on a Beta-Blocker and has received one dose within the  past 24 hours (No further documentation required).       Informed Consent: Patient understands risks and agrees with Anesthesia plan.  Questions answered. Anesthesia consent signed with patient.  ASA Score: 2     Day of Surgery Review of History & Physical:  There are no significant changes.  H&P update referred to the provider.         Ready For Surgery From Anesthesia Perspective.
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
## 2020-09-28 NOTE — [employer] PATIENT INSTRUCTIONS
Discharge Summary/Instructions after an Endoscopic Procedure  Patient Name: Kristin Goodman  Patient MRN: [id number]  Patient YOB: 1945 Monday, September 28, 2020  Jaylan Flynn MD  RESTRICTIONS:  During your procedure today, you received medications for sedation.  These   medications may affect your judgment, balance and coordination.  Therefore,   for 24 hours, you have the following restrictions:   - DO NOT drive a car, operate machinery, make legal/financial decisions,   sign important papers or drink alcohol.    ACTIVITY:  Today: no heavy lifting, straining or running due to procedural   sedation/anesthesia.  The following day: return to full activity including work.  DIET:  Eat and drink normally unless instructed otherwise.     TREATMENT FOR COMMON SIDE EFFECTS:  - Mild abdominal pain, nausea, belching, bloating or excessive gas:  rest,   eat lightly and use a heating pad.  - Sore Throat: treat with throat lozenges and/or gargle with warm salt   water.  - Because air was used during the procedure, expelling large amounts of air   from your rectum or belching is normal.  - If a bowel prep was taken, you may not have a bowel movement for 1-3 days.    This is normal.  SYMPTOMS TO WATCH FOR AND REPORT TO YOUR PHYSICIAN:  1. Abdominal pain or bloating, other than gas cramps.  2. Chest pain.  3. Back pain.  4. Signs of infection such as: chills or fever occurring within 24 hours   after the procedure.  5. Rectal bleeding, which would show as bright red, maroon, or black stools.   (A tablespoon of blood from the rectum is not serious, especially if   hemorrhoids are present.)  6. Vomiting.  7. Weakness or dizziness.  GO DIRECTLY TO THE NEAREST EMERGENCY ROOM IF YOU HAVE ANY OF THE FOLLOWING:      Difficulty breathing              Chills and/or fever over 101 F   Persistent vomiting and/or vomiting blood   Severe abdominal pain   Severe chest pain   Black, tarry stools   Bleeding- more than one  tablespoon   Any other symptom or condition that you feel may need urgent attention  Your doctor recommends these additional instructions:  If any biopsies were taken, your doctors clinic will contact you in 1 to 2   weeks with any results.  - Await pathology results.   - Repeat colonoscopy in 5 years for surveillance based on pathology results.     - Return to referring physician as previously scheduled.   - Discharge patient to home (via wheelchair).  For questions, problems or results please call your physician - Jaylan Flynn MD at Work:  [phone].  [facility] can be reached at [phone]     IF A COMPLICATION OR EMERGENCY SITUATION ARISES AND YOU ARE UNABLE TO REACH   YOUR PHYSICIAN - GO DIRECTLY TO THE EMERGENCY ROOM.  Jaylan Flynn MD  9/28/2020 10:27:16 AM  This report has been verified and signed electronically.  [employer]

## 2020-09-28 NOTE — LETTER
Endoscopy Scheduling Department   [address] [place] [address]  [place] [address]    07/30/2020  Medical Record # [id number]     Dear Kristin Goodman,    An order for the following procedure, Colonoscopy, was placed for you by .  Multiple attempts have been made to reach you at [phone] (home) .     Please call the scheduling nurse at the [facility] to schedule this procedure [phone].     Did you know?   Colorectal cancer, the 2nd most common cancer, is extremely preventable if polyps that lead to cancer are detected and removed, and it is very curable if the cancer is detected in its early stages.   Since there are very few symptoms associated with colorectal cancer, regular screening is essential.   If you are 50 years of age or older, have a family history of polyps or colorectal cancer, or have symptoms such as bleeding, pain, change in bowels, etc. please schedule a screening or evaluation.     If you have already scheduled this appointment, please disregard this letter.    Sincerely,  [facility] Scheduling Dept.  [phone]

## 2020-09-28 NOTE — TRANSFER OF CARE
"Anesthesia Transfer of Care Note    Patient: Kristin Goodman    Procedure(s) Performed: Procedure(s) (LRB):  COLONOSCOPY (N/A)    Patient location: GI    Anesthesia Type: general    Transport from OR: Transported from OR on room air with adequate spontaneous ventilation    Post pain: adequate analgesia    Post assessment: no apparent anesthetic complications and tolerated procedure well    Post vital signs: stable    Level of consciousness: awake and alert    Nausea/Vomiting: no nausea/vomiting    Complications: none    Transfer of care protocol was followed      Last vitals:   Visit Vitals  BP (!) 163/58 (BP Location: Right arm, Patient Position: Lying)   Pulse 89   Temp 36.7 °C (98 °F) (Oral)   Resp 16   Ht 5' 1" (1.549 m)   Wt 71.7 kg (158 lb)   SpO2 100%   Breastfeeding No   BMI 29.85 kg/m²     "

## 2020-09-28 NOTE — DISCHARGE SUMMARY
[facility]  Discharge Summary      Admit Date: 9/28/2020    Discharge Date and Time:  09/28/2020 10:24 AM    Attending Physician: Nicholas Olmos MD     Reason for Admission: Screening colonoscopy    Procedures Performed: Procedure(s) (LRB):  COLONOSCOPY (N/A)    Hospital Course (synopsis of major diagnoses, care, treatment, and services provided during the course of the hospital stay): Outpatient colonoscopy     Consults: none    Significant Diagnostic Studies: Colonoscopy    Final Diagnoses:    Principal Problem: <principal problem not specified>   Secondary Diagnoses: Colonoscopy    Discharged Condition: good    Disposition: Home or Self Care    Follow Up/Patient Instructions: Follow-up with referring physician             Resume previous diet and activity.    Medications:  Reconciled Home Medications:      Medication List      CONTINUE taking these medications    amLODIPine 5 MG tablet  Commonly known as: NORVASC  Take 1 tablet (5 mg total) by mouth once daily.     aspirin 81 MG Chew  Take 81 mg by mouth once daily.     baclofen 10 MG tablet  Commonly known as: LIORESAL     epinastine 0.05 % ophthalmic solution     EUTHYROX 25 MCG tablet  Generic drug: levothyroxine  Take 1 tablet by mouth once daily     fish,bora,flax oils-om3,6,9no1 1,200 mg Cap  Commonly known as: OMEGA 3-6-9  Take 1 each by mouth once daily.     hydrALAZINE 100 MG tablet  Commonly known as: APRESOLINE  Take 1 tablet (100 mg total) by mouth 3 (three) times daily.     lisinopriL 40 MG tablet  Commonly known as: PRINIVIL,ZESTRIL  Take 1 tablet (40 mg total) by mouth once daily.     metoprolol succinate 25 MG 24 hr tablet  Commonly known as: TOPROL-XL  Take 1 tablet (25 mg total) by mouth once daily.     TYLENOL ARTHRITIS PAIN ORAL  Take 1 tablet by mouth once daily.     vitamin renal formula (B-complex-vitamin c-folic acid) 1 mg Cap  Commonly known as: TRIPHROCAPS  Take 1 capsule by mouth once daily.        ASK your doctor  about these medications    polyethylene glycol [id number] -5.84 gram Solr  Commonly known as: COLYTE  Take 4,000 mLs (4 L total) by mouth once. for 1 dose  Ask about: Should I take this medication?          Discharge Procedure Orders   Diet general     Follow-up Information     Ilene Kan MD.    Specialty: Family Medicine  Why: As needed  Contact information:  [address] [place] [address] [phone]

## 2020-09-29 LAB
FINAL PATHOLOGIC DIAGNOSIS: NORMAL
GROSS: NORMAL

## 2020-10-05 ENCOUNTER — PATIENT OUTREACH (OUTPATIENT)
Dept: ADMINISTRATIVE | Facility: OTHER | Age: 75
End: 2020-10-05

## 2020-10-05 ENCOUNTER — OFFICE VISIT (OUTPATIENT)
Dept: CARDIOLOGY | Facility: CLINIC | Age: 75
End: 2020-10-05
Payer: MEDICARE

## 2020-10-05 VITALS
BODY MASS INDEX: 31.83 KG/M2 | OXYGEN SATURATION: 99 % | WEIGHT: 168.56 LBS | HEART RATE: 98 BPM | HEIGHT: 61 IN | SYSTOLIC BLOOD PRESSURE: 152 MMHG | DIASTOLIC BLOOD PRESSURE: 82 MMHG

## 2020-10-05 DIAGNOSIS — R55 SYNCOPE, UNSPECIFIED SYNCOPE TYPE: ICD-10-CM

## 2020-10-05 DIAGNOSIS — I50.32 CHRONIC DIASTOLIC HEART FAILURE: ICD-10-CM

## 2020-10-05 DIAGNOSIS — R06.09 DOE (DYSPNEA ON EXERTION): ICD-10-CM

## 2020-10-05 DIAGNOSIS — I10 ESSENTIAL HYPERTENSION: ICD-10-CM

## 2020-10-05 DIAGNOSIS — I27.20 PULMONARY HTN: Primary | ICD-10-CM

## 2020-10-05 PROCEDURE — 99999 PR PBB SHADOW E&M-EST. PATIENT-LVL IV: ICD-10-PCS | Mod: PBBFAC,,, | Performed by: INTERNAL MEDICINE

## 2020-10-05 PROCEDURE — 3288F FALL RISK ASSESSMENT DOCD: CPT | Mod: CPTII,S$GLB,, | Performed by: INTERNAL MEDICINE

## 2020-10-05 PROCEDURE — 99214 OFFICE O/P EST MOD 30 MIN: CPT | Mod: S$GLB,,, | Performed by: INTERNAL MEDICINE

## 2020-10-05 PROCEDURE — 3008F BODY MASS INDEX DOCD: CPT | Mod: CPTII,S$GLB,, | Performed by: INTERNAL MEDICINE

## 2020-10-05 PROCEDURE — 3077F PR MOST RECENT SYSTOLIC BLOOD PRESSURE >= 140 MM HG: ICD-10-PCS | Mod: CPTII,S$GLB,, | Performed by: INTERNAL MEDICINE

## 2020-10-05 PROCEDURE — 1100F PR PT FALLS ASSESS DOC 2+ FALLS/FALL W/INJURY/YR: ICD-10-PCS | Mod: CPTII,S$GLB,, | Performed by: INTERNAL MEDICINE

## 2020-10-05 PROCEDURE — 3008F PR BODY MASS INDEX (BMI) DOCUMENTED: ICD-10-PCS | Mod: CPTII,S$GLB,, | Performed by: INTERNAL MEDICINE

## 2020-10-05 PROCEDURE — 1126F PR PAIN SEVERITY QUANTIFIED, NO PAIN PRESENT: ICD-10-PCS | Mod: S$GLB,,, | Performed by: INTERNAL MEDICINE

## 2020-10-05 PROCEDURE — 1159F MED LIST DOCD IN RCRD: CPT | Mod: S$GLB,,, | Performed by: INTERNAL MEDICINE

## 2020-10-05 PROCEDURE — 1126F AMNT PAIN NOTED NONE PRSNT: CPT | Mod: S$GLB,,, | Performed by: INTERNAL MEDICINE

## 2020-10-05 PROCEDURE — 99999 PR PBB SHADOW E&M-EST. PATIENT-LVL IV: CPT | Mod: PBBFAC,,, | Performed by: INTERNAL MEDICINE

## 2020-10-05 PROCEDURE — 1159F PR MEDICATION LIST DOCUMENTED IN MEDICAL RECORD: ICD-10-PCS | Mod: S$GLB,,, | Performed by: INTERNAL MEDICINE

## 2020-10-05 PROCEDURE — 3288F PR FALLS RISK ASSESSMENT DOCUMENTED: ICD-10-PCS | Mod: CPTII,S$GLB,, | Performed by: INTERNAL MEDICINE

## 2020-10-05 PROCEDURE — 3079F PR MOST RECENT DIASTOLIC BLOOD PRESSURE 80-89 MM HG: ICD-10-PCS | Mod: CPTII,S$GLB,, | Performed by: INTERNAL MEDICINE

## 2020-10-05 PROCEDURE — 3077F SYST BP >= 140 MM HG: CPT | Mod: CPTII,S$GLB,, | Performed by: INTERNAL MEDICINE

## 2020-10-05 PROCEDURE — 99214 PR OFFICE/OUTPT VISIT, EST, LEVL IV, 30-39 MIN: ICD-10-PCS | Mod: S$GLB,,, | Performed by: INTERNAL MEDICINE

## 2020-10-05 PROCEDURE — 3079F DIAST BP 80-89 MM HG: CPT | Mod: CPTII,S$GLB,, | Performed by: INTERNAL MEDICINE

## 2020-10-05 PROCEDURE — 1100F PTFALLS ASSESS-DOCD GE2>/YR: CPT | Mod: CPTII,S$GLB,, | Performed by: INTERNAL MEDICINE

## 2020-10-05 RX ORDER — METOPROLOL SUCCINATE 50 MG/1
50 TABLET, EXTENDED RELEASE ORAL DAILY
Qty: 90 TABLET | Refills: 3 | Status: SHIPPED | OUTPATIENT
Start: 2020-10-05 | End: 2021-11-30

## 2020-10-05 NOTE — PROGRESS NOTES
Health Maintenance Due   Topic Date Due    Shingles Vaccine (1 of 2) 04/07/1997    Influenza Vaccine (1) 08/01/2020    DEXA SCAN  10/27/2020     Updates were requested from care everywhere.  Chart was reviewed for overdue [facility]active [facility]) topics (CRS, Breast Cancer Screening, Eye exam)  Health Maintenance has been updated.  LINKS immunization registry triggered.  Immunizations were reconciled.

## 2020-10-05 NOTE — ANESTHESIA POSTPROCEDURE EVALUATION
Anesthesia Post Evaluation    Patient: Kristin Goodman    Procedure(s) Performed: Procedure(s) (LRB):  COLONOSCOPY (N/A)    Final Anesthesia Type: general    Patient location during evaluation: GI PACU  Patient participation: Yes- Able to Participate  Level of consciousness: awake and alert  Post-procedure vital signs: reviewed and stable  Pain management: adequate  Airway patency: patent    PONV status at discharge: No PONV  Anesthetic complications: no      Cardiovascular status: blood pressure returned to baseline and hemodynamically stable  Respiratory status: unassisted and spontaneous ventilation  Hydration status: euvolemic  Follow-up not needed.          Vitals Value Taken Time   /76 09/28/20 1056   Temp 36.7 °C (98 °F) 09/28/20 1026   Pulse 80 09/28/20 1056   Resp 20 09/28/20 1056   SpO2 99 % 09/28/20 1056         Event Time   Out of Recovery 11:04:54         Pain/Kathryn Score: No data recorded
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
## 2020-10-08 ENCOUNTER — PES CALL (OUTPATIENT)
Dept: ADMINISTRATIVE | Facility: CLINIC | Age: 75
End: 2020-10-08

## 2020-10-13 ENCOUNTER — PES CALL (OUTPATIENT)
Dept: ADMINISTRATIVE | Facility: CLINIC | Age: 75
End: 2020-10-13

## 2020-10-19 ENCOUNTER — TELEPHONE (OUTPATIENT)
Dept: FAMILY MEDICINE | Facility: CLINIC | Age: 75
End: 2020-10-19

## 2020-10-19 NOTE — TELEPHONE ENCOUNTER
----- Message from Niada Ferrer sent at 10/17/2020 11:15 AM CDT -----  Regarding: Flu Shot  Contact: Patient  Patient is requesting a call back to schedule an appt for a flu shot     Patient can be reached at [phone]

## 2020-11-09 ENCOUNTER — OFFICE VISIT (OUTPATIENT)
Dept: PULMONOLOGY | Facility: CLINIC | Age: 75
End: 2020-11-09
Payer: MEDICARE

## 2020-11-09 VITALS
HEIGHT: 61 IN | HEART RATE: 89 BPM | OXYGEN SATURATION: 99 % | SYSTOLIC BLOOD PRESSURE: 172 MMHG | TEMPERATURE: 97 F | DIASTOLIC BLOOD PRESSURE: 91 MMHG | WEIGHT: 169.06 LBS | BODY MASS INDEX: 31.92 KG/M2

## 2020-11-09 DIAGNOSIS — G47.33 OSA (OBSTRUCTIVE SLEEP APNEA): ICD-10-CM

## 2020-11-09 DIAGNOSIS — R06.09 DOE (DYSPNEA ON EXERTION): ICD-10-CM

## 2020-11-09 PROCEDURE — 99204 OFFICE O/P NEW MOD 45 MIN: CPT | Mod: S$GLB,,, | Performed by: INTERNAL MEDICINE

## 2020-11-09 PROCEDURE — 3077F SYST BP >= 140 MM HG: CPT | Mod: CPTII,S$GLB,, | Performed by: INTERNAL MEDICINE

## 2020-11-09 PROCEDURE — 3077F PR MOST RECENT SYSTOLIC BLOOD PRESSURE >= 140 MM HG: ICD-10-PCS | Mod: CPTII,S$GLB,, | Performed by: INTERNAL MEDICINE

## 2020-11-09 PROCEDURE — 1125F AMNT PAIN NOTED PAIN PRSNT: CPT | Mod: S$GLB,,, | Performed by: INTERNAL MEDICINE

## 2020-11-09 PROCEDURE — 1159F MED LIST DOCD IN RCRD: CPT | Mod: S$GLB,,, | Performed by: INTERNAL MEDICINE

## 2020-11-09 PROCEDURE — 99204 PR OFFICE/OUTPT VISIT, NEW, LEVL IV, 45-59 MIN: ICD-10-PCS | Mod: S$GLB,,, | Performed by: INTERNAL MEDICINE

## 2020-11-09 PROCEDURE — 1101F PT FALLS ASSESS-DOCD LE1/YR: CPT | Mod: CPTII,S$GLB,, | Performed by: INTERNAL MEDICINE

## 2020-11-09 PROCEDURE — 3080F PR MOST RECENT DIASTOLIC BLOOD PRESSURE >= 90 MM HG: ICD-10-PCS | Mod: CPTII,S$GLB,, | Performed by: INTERNAL MEDICINE

## 2020-11-09 PROCEDURE — 1159F PR MEDICATION LIST DOCUMENTED IN MEDICAL RECORD: ICD-10-PCS | Mod: S$GLB,,, | Performed by: INTERNAL MEDICINE

## 2020-11-09 PROCEDURE — 99999 PR PBB SHADOW E&M-EST. PATIENT-LVL IV: ICD-10-PCS | Mod: PBBFAC,,, | Performed by: INTERNAL MEDICINE

## 2020-11-09 PROCEDURE — 1125F PR PAIN SEVERITY QUANTIFIED, PAIN PRESENT: ICD-10-PCS | Mod: S$GLB,,, | Performed by: INTERNAL MEDICINE

## 2020-11-09 PROCEDURE — 3008F PR BODY MASS INDEX (BMI) DOCUMENTED: ICD-10-PCS | Mod: CPTII,S$GLB,, | Performed by: INTERNAL MEDICINE

## 2020-11-09 PROCEDURE — 1101F PR PT FALLS ASSESS DOC 0-1 FALLS W/OUT INJ PAST YR: ICD-10-PCS | Mod: CPTII,S$GLB,, | Performed by: INTERNAL MEDICINE

## 2020-11-09 PROCEDURE — 3080F DIAST BP >= 90 MM HG: CPT | Mod: CPTII,S$GLB,, | Performed by: INTERNAL MEDICINE

## 2020-11-09 PROCEDURE — 99999 PR PBB SHADOW E&M-EST. PATIENT-LVL IV: CPT | Mod: PBBFAC,,, | Performed by: INTERNAL MEDICINE

## 2020-11-09 PROCEDURE — 3008F BODY MASS INDEX DOCD: CPT | Mod: CPTII,S$GLB,, | Performed by: INTERNAL MEDICINE

## 2020-11-09 NOTE — PROGRESS NOTES
Kristin Goodman  was seen as a new patient at the request  Cesar Mills MD for the evaluation of  sob.    CHIEF COMPLAINT:  Apnea      HISTORY OF PRESENT ILLNESS: Kristin Goodman is a 75 y.o. female  has a past medical history of Allergy, Back pain, Chronic diastolic heart failure (8/31/2020), Chronic diastolic heart failure (8/31/2020), Colon polyp, Disorder of kidney and ureter, H/O Bell's palsy (2006), Helicobacter pylori (H. pylori), HTN (hypertension), Hypothyroid, OA (osteoarthritis), DONAVAN (obstructive sleep apnea) (11/9/2020), Pneumonia due to other staphylococcus, Pulmonary HTN (8/31/2020), Trouble in sleeping, and Urinary incontinence.  Patient was seen by Dr. Mills for mitral valve regurgitation and htn.  Last echo in 2019, patient's mvr was documented as mild to moderate.  Patient is on 3 bp medication.  Patient was referred to pulmonary for further inputs.  S/p negative nuclear stress test.    Patient smoked 1/2 ppd x 6 years.  Quit in 1983.  Patient with progressive carey since 2018.  Most noticeable when going up or down flight of stairs.  Also noticeable when rushing.  Still can walk 1 hour twice per week.  Get winded toward the end of walk.  Rare cough (1-2 times per week).  No wheezing.   +syncope in 8/2020.  No lower extremities swelling.  +2 pillows orthopnea.  +10-15 weigh gain since 3/20.      +occasional waking up in the middle of the night gasping for air.  Tire upon awake.  +loud snoring.        PAST MEDICAL HISTORY:    Active Ambulatory Problems     Diagnosis Date Noted    Hypothyroid     HTN (hypertension)     Trochanteric bursitis of right hip 10/17/2018    Mitral valve regurgitation 04/11/2019    Chest pain 04/27/2019    Elevated CPK 04/27/2019    Syncope 08/13/2019    Inflammatory spondylopathy 08/22/2019    CAREY (dyspnea on exertion) 08/31/2020    Pulmonary HTN 08/31/2020    Chronic diastolic heart failure 08/31/2020    Colon cancer screening 09/28/2020    DONAVAN (obstructive sleep  apnea) 11/09/2020     Resolved Ambulatory Problems     Diagnosis Date Noted    OA (osteoarthritis)     Helicobacter pylori (H. pylori) 06/03/2012    Internal hemorrhoids 06/03/2012    Type II or unspecified type diabetes mellitus without mention of complication, not stated as uncontrolled 02/12/2013    Primary osteoarthritis involving multiple joints 07/08/2016    Hyperkalemia 07/27/2016    Non-traumatic rhabdomyolysis 07/27/2016    CKD (chronic kidney disease) stage 3, GFR 30-59 ml/min 08/16/2016    Facet arthropathy 05/16/2018    Essential hypertension, benign 02/14/2019    WILFREDO (acute kidney injury) 04/27/2019     Past Medical History:   Diagnosis Date    Allergy     Back pain     Colon polyp     Disorder of kidney and ureter     H/O Bell's palsy 2006    Helicobacter pylori (H. pylori)     Pneumonia due to other staphylococcus     Trouble in sleeping     Urinary incontinence                 PAST SURGICAL HISTORY:    Past Surgical History:   Procedure Laterality Date    COLONOSCOPY N/A 9/28/2020    Procedure: COLONOSCOPY;  Surgeon: Jaylan Flynn MD;  Location: [facility];  Service: Endoscopy;  Laterality: N/A;    OOPHORECTOMY      TOTAL ABDOMINAL HYSTERECTOMY      19 yrs ago         FAMILY HISTORY:                Family History   Problem Relation Age of Onset    Arthritis Mother     Early death Mother 56    Hypertension Mother     Diabetes Father     Early death Father 62    Hypertension Father     Stroke Father     Arthritis Sister     Cancer Sister         cervical    Early death Sister 63    Heart disease Sister         anyuresem    Hypertension Sister     Hyperlipidemia Sister     Alzheimer's disease Sister     Rheum arthritis Sister     Arthritis Brother     Cancer Brother         lung cancer    Early death Brother 59    Heart disease Brother         heart attack    Hypertension Brother     Vision loss Brother     Prostate cancer Brother     Hypertension Daughter      Breast cancer Other        SOCIAL HISTORY:          Tobacco:   Social History     Tobacco Use   Smoking Status Former Smoker    Packs/day: 0.50    Years: 6.00    Pack years: 3.00   Smokeless Tobacco Never Used   Tobacco Comment    Quit ~ 30 years ago     alcohol use:    Social History     Substance and Sexual Activity   Alcohol Use No               Occupation: former nursery    ALLERGIES:    Review of patient's allergies indicates:   Allergen Reactions    Penicillins      Other reaction(s): Hives    Sulfa (sulfonamide antibiotics) Rash       CURRENT MEDICATIONS:    Current Outpatient Medications   Medication Sig Dispense Refill    ACETAMINOPHEN (TYLENOL ARTHRITIS PAIN ORAL) Take 1 tablet by mouth once daily.       amLODIPine (NORVASC) 5 MG tablet Take 1 tablet (5 mg total) by mouth once daily. 90 tablet 3    aspirin 81 MG chewable tablet Take 81 mg by mouth once daily.        baclofen (LIORESAL) 10 MG tablet       epinastine 0.05 % ophthalmic solution       EUTHYROX 25 mcg tablet Take 1 tablet by mouth once daily 90 tablet 0    fish,bora,flax oils-om3,6,9no1 (OMEGA 3-6-9) 1,200 mg Cap Take 1 each by mouth once daily. 30 capsule 3    hydrALAZINE (APRESOLINE) 100 MG tablet Take 1 tablet (100 mg total) by mouth 3 (three) times daily. 90 tablet 3    lisinopriL (PRINIVIL,ZESTRIL) 40 MG tablet Take 1 tablet (40 mg total) by mouth once daily. 90 tablet 0    metoprolol succinate (TOPROL-XL) 50 MG 24 hr tablet Take 1 tablet (50 mg total) by mouth once daily. 90 tablet 3    TRIPHROCAPS 1 mg Cap Take 1 capsule by mouth once daily 90 capsule 0     No current facility-administered medications for this visit.                   REVIEW OF SYSTEMS:     Pulmonary related symptoms as per HPI.  Gen:  no weight loss, no fever, no night sweat  HEENT:  no visual changes, no sore throat, no hearing loss  CV:  Per hpi  GI:  no melena, no hematochezia, no diarhea, no constipation.  :  no dysuria, no hematuria, no  "hesistancy, no dribbling  Neuro:  no syncope, no vertigo, no tinitus  Psych:  No homocide or suicide ideation; no depression.  Endocrine:  No heat or cold intolerance.  Sleep:  Per hpi  Otherwise, a balance of systems reviewed is negative.          PHYSICAL EXAM:  Vitals:    11/09/20 1357   BP: (!) 172/91   Pulse: 89   Temp: 97.1 °F (36.2 °C)   TempSrc: Temporal   SpO2: 99%   Weight: 76.7 kg (169 lb 1.5 oz)   Height: 5' 1" (1.549 m)   PainSc:   6   PainLoc: Generalized     Body mass index is 31.95 kg/m².     GENERAL:  well develop; no apparent distress  HEENT:  no nasal congestion; no discharge noted; class 4 modified mallampatti.  Lateral beating nystagmus.   NECK:  supple; no palpable masses.  CARDIO: regular rate and rhythm  PULM:  clear to auscultation bilaterally; no intercostals retractions; no accessory muscle usage   ABDOMEN:  soft nontender/nondistended.  +bowel sound  EXTREMITIES no cce  NEURO:  CN II-XII intact.  5/5 motor in all extremities.  sensation grossly intact   to light touch.  PSYCH:  normal affect.  Alert and oriented x 4    LABS  Pulmonary Functions Testing Results(personally reviewed):  none  ABG (personally reviewed):  none  CXR (personally reviewed):  8/13/20 no effusion or consolidation; mild scoliosis  CT CHEST(personally reviewed):  none    Nuclear stress test 5/1/19  · Probably normal Ramya MPI  · The perfusion scan is free of evidence from myocardial ischemia or injury.  · There is a mild intensity defect in the anterior wall of the left ventricle, secondary to breast attenuation.  · LV cavity size is normal at rest.  · Visually estimated LV function is normal.  · Resting wall motion is physiologic.    Echo 2/19/19  · Normal left ventricular systolic function. The estimated ejection fraction is 55%  · Concentric left ventricular remodeling.  · Normal LV diastolic function.  · Mild-to-moderate mitral regurgitation.      ASSESSMENT/PLAN  Problem List Items Addressed This Visit     CAREY " (dyspnea on exertion)    Overview     Diastolic dysfunction + Mitral valve regurgitation + weight gain.  No overt evidence of parenchymal lung disease noted on cxr.           Current Assessment & Plan     Baseline pft         Relevant Orders    Complete PFT with bronchodilator    COVID-19 Routine Screening    DONAVAN (obstructive sleep apnea)    Current Assessment & Plan     The patient symptomatically has loud snoring, restless sleep, gasping sensation during sleep with findings of htn, high grade mallampatti. This warrants further investigation for possible obstructive sleep apnea.  Patient will be contacted after sleep study is done.            Relevant Orders    Home Sleep Studies            Patient will Follow up for after sleep study. with md/np.    CC: Send copy of this note to Cesar Mills MD

## 2020-11-09 NOTE — ASSESSMENT & PLAN NOTE
The patient symptomatically has loud snoring, restless sleep, gasping sensation during sleep with findings of htn, high grade mallampatti. This warrants further investigation for possible obstructive sleep apnea.  Patient will be contacted after sleep study is done.

## 2020-11-09 NOTE — LETTER
November 9, 2020      Cesar Mills MD  [address] [facility] [address]  [address]  [place] [address]           [facility]  [address]  [place] [address]  Phone: [phone]  Fax: [phone]          Patient: Kristin Goodman   MR Number: [id number]   YOB: 1945   Date of Visit: 11/9/2020       Dear Dr. Cesar Mills:    Thank you for referring Kristin Goodman to me for evaluation. Attached you will find relevant portions of my assessment and plan of care.    If you have questions, please do not hesitate to call me. I look forward to following Kristin Goodman along with you.    Sincerely,    Vickey Darby MD    Enclosure  CC:  No Recipients    If you would like to receive this communication electronically, please contact [email or web address] or [phone] to request more information on [employer] Link access.    For providers and/or their staff who would like to refer a patient to [facility], please contact us through our one-stop-shop provider referral line, [facility], at [phone].    If you feel you have received this communication in error or would no longer like to receive these types of communications, please e-mail [email or web address]

## 2020-11-19 ENCOUNTER — HOSPITAL ENCOUNTER (OUTPATIENT)
Dept: SLEEP MEDICINE | Facility: HOSPITAL | Age: 75
Discharge: HOME | End: 2020-11-19
Attending: INTERNAL MEDICINE
Payer: MEDICARE

## 2020-11-19 DIAGNOSIS — G47.33 OSA (OBSTRUCTIVE SLEEP APNEA): ICD-10-CM

## 2020-11-19 DIAGNOSIS — G47.8 SLEEP AROUSAL DISORDER: ICD-10-CM

## 2020-11-19 PROCEDURE — 95800 SLP STDY UNATTENDED: CPT

## 2020-11-19 PROCEDURE — 95800 SLP STDY UNATTENDED: CPT | Mod: 26,,, | Performed by: INTERNAL MEDICINE

## 2020-11-19 PROCEDURE — 95800 PR SLEEP STUDY, UNATTENDED, RECORD HEART RATE/O2 SAT/RESP ANAL/SLEEP TIME: ICD-10-PCS | Mod: 26,,, | Performed by: INTERNAL MEDICINE

## 2020-11-19 NOTE — PROGRESS NOTES
The patient ID was verified. She was instructed on how to turn the Home Sleep Testing device on and off, how to apply the sensors.  She was encouraged to sleep on supine position and must have 6 hours of sleep. The patient was instructed not to get the device wet and return it back to us. All questions were answered prior to patient leaving. This was a curbside patient contact.

## 2020-11-25 ENCOUNTER — TELEPHONE (OUTPATIENT)
Dept: PULMONOLOGY | Facility: CLINIC | Age: 75
End: 2020-11-25

## 2020-11-25 DIAGNOSIS — G47.33 OSA (OBSTRUCTIVE SLEEP APNEA): Primary | ICD-10-CM

## 2020-11-25 NOTE — TELEPHONE ENCOUNTER
Please inform patient that her/his home sleep study did not indicate that he/she has sleep apnea.  However, since the suspicion for sleep apnea is high.  I am going to recommend an in lab sleep study.  An order was placed for in lab sleep study.  Patient should expect a call from sleep lab in 7-10 working days.

## 2020-11-25 NOTE — PROCEDURES
"Dear Provider,     You have ordered sleep LAB services to perform the sleep study for Kristin Goodman.  The sleep study that you ordered is complete.      Please find Sleep Study result in "Chart Review" under the "Media tab."      As the ordering provider, you are responsible for reviewing the results and implementing a treatment plan with your patient.    If you need a Sleep Medicine provider to explain the sleep study findings and arrange treatment for the patient, please refer patient for consultation to our Sleep Clinic via [facility] with Ambulatory Consult Sleep.    To do that please place an order for an  "Ambulatory Consult Sleep" - it will go to our clinic work queue for our Medical Assistant to contact the patient for an appointment.     For any questions, please contact our clinic staff at [phone] to talk to clinical staff.   "

## 2020-12-22 ENCOUNTER — TELEPHONE (OUTPATIENT)
Dept: NEUROLOGY | Facility: CLINIC | Age: 75
End: 2020-12-22

## 2020-12-22 NOTE — TELEPHONE ENCOUNTER
Canceled the sleep study patient will call back.            ----- Message from Miguel Ellison sent at 12/22/2020 11:40 AM CST -----      Name of Who is Calling: DERICK DESAI [[id number]]      What is the request in detail: Pt called said she'll be out of town for her sleep study appt and would like to reschedule.Please contact to further discuss and advise.          Can the clinic reply by [facility]: N      What Number to Call Back if not in [facility]: [phone]

## 2021-01-08 ENCOUNTER — TELEPHONE (OUTPATIENT)
Dept: FAMILY MEDICINE | Facility: CLINIC | Age: 76
End: 2021-01-08

## 2021-01-08 ENCOUNTER — LAB VISIT (OUTPATIENT)
Dept: LAB | Facility: HOSPITAL | Age: 76
End: 2021-01-08
Attending: PHYSICIAN ASSISTANT
Payer: MEDICARE

## 2021-01-08 ENCOUNTER — OFFICE VISIT (OUTPATIENT)
Dept: FAMILY MEDICINE | Facility: CLINIC | Age: 76
End: 2021-01-08
Payer: MEDICARE

## 2021-01-08 VITALS
TEMPERATURE: 98 F | DIASTOLIC BLOOD PRESSURE: 82 MMHG | RESPIRATION RATE: 17 BRPM | OXYGEN SATURATION: 97 % | HEART RATE: 104 BPM | BODY MASS INDEX: 32.46 KG/M2 | WEIGHT: 171.94 LBS | SYSTOLIC BLOOD PRESSURE: 138 MMHG | HEIGHT: 61 IN

## 2021-01-08 DIAGNOSIS — I50.32 CHRONIC DIASTOLIC HEART FAILURE: ICD-10-CM

## 2021-01-08 DIAGNOSIS — R73.03 PREDIABETES: ICD-10-CM

## 2021-01-08 DIAGNOSIS — Z78.0 POSTMENOPAUSAL: ICD-10-CM

## 2021-01-08 DIAGNOSIS — M79.18 MUSCLE ACHE OF EXTREMITY: ICD-10-CM

## 2021-01-08 DIAGNOSIS — R35.0 URINARY FREQUENCY: ICD-10-CM

## 2021-01-08 DIAGNOSIS — M79.18 MUSCLE ACHE OF EXTREMITY: Primary | ICD-10-CM

## 2021-01-08 DIAGNOSIS — E03.9 ACQUIRED HYPOTHYROIDISM: ICD-10-CM

## 2021-01-08 DIAGNOSIS — I10 ESSENTIAL HYPERTENSION: ICD-10-CM

## 2021-01-08 LAB
ALBUMIN SERPL BCP-MCNC: 3.8 G/DL (ref 3.5–5.2)
ALP SERPL-CCNC: 68 U/L (ref 55–135)
ALT SERPL W/O P-5'-P-CCNC: 26 U/L (ref 10–44)
ANION GAP SERPL CALC-SCNC: 13 MMOL/L (ref 8–16)
AST SERPL-CCNC: 34 U/L (ref 10–40)
BASOPHILS # BLD AUTO: 0.03 K/UL (ref 0–0.2)
BASOPHILS NFR BLD: 0.4 % (ref 0–1.9)
BILIRUB SERPL-MCNC: 0.3 MG/DL (ref 0.1–1)
BILIRUB SERPL-MCNC: NEGATIVE MG/DL
BLOOD URINE, POC: NEGATIVE
BUN SERPL-MCNC: 14 MG/DL (ref 8–23)
CALCIUM SERPL-MCNC: 9.4 MG/DL (ref 8.7–10.5)
CHLORIDE SERPL-SCNC: 105 MMOL/L (ref 95–110)
CHOLEST SERPL-MCNC: 159 MG/DL (ref 120–199)
CHOLEST/HDLC SERPL: 3.5 {RATIO} (ref 2–5)
CK SERPL-CCNC: 317 U/L (ref 20–180)
CLARITY, POC UA: ABNORMAL
CO2 SERPL-SCNC: 23 MMOL/L (ref 23–29)
COLOR, POC UA: ABNORMAL
CREAT SERPL-MCNC: 1 MG/DL (ref 0.5–1.4)
DIFFERENTIAL METHOD: ABNORMAL
EOSINOPHIL # BLD AUTO: 0.1 K/UL (ref 0–0.5)
EOSINOPHIL NFR BLD: 1.6 % (ref 0–8)
ERYTHROCYTE [DISTWIDTH] IN BLOOD BY AUTOMATED COUNT: 14.5 % (ref 11.5–14.5)
EST. GFR  (AFRICAN AMERICAN): >60 ML/MIN/1.73 M^2
EST. GFR  (NON AFRICAN AMERICAN): 56 ML/MIN/1.73 M^2
ESTIMATED AVG GLUCOSE: 105 MG/DL (ref 68–131)
GLUCOSE SERPL-MCNC: 99 MG/DL (ref 70–110)
GLUCOSE UR QL STRIP: NORMAL
HBA1C MFR BLD HPLC: 5.3 % (ref 4–5.6)
HCT VFR BLD AUTO: 41 % (ref 37–48.5)
HDLC SERPL-MCNC: 46 MG/DL (ref 40–75)
HDLC SERPL: 28.9 % (ref 20–50)
HGB BLD-MCNC: 12.4 G/DL (ref 12–16)
IMM GRANULOCYTES # BLD AUTO: 0.02 K/UL (ref 0–0.04)
IMM GRANULOCYTES NFR BLD AUTO: 0.3 % (ref 0–0.5)
KETONES UR QL STRIP: NEGATIVE
LDLC SERPL CALC-MCNC: 85.4 MG/DL (ref 63–159)
LEUKOCYTE ESTERASE URINE, POC: ABNORMAL
LYMPHOCYTES # BLD AUTO: 2.8 K/UL (ref 1–4.8)
LYMPHOCYTES NFR BLD: 37.6 % (ref 18–48)
MAGNESIUM SERPL-MCNC: 2.3 MG/DL (ref 1.6–2.6)
MCH RBC QN AUTO: 28.2 PG (ref 27–31)
MCHC RBC AUTO-ENTMCNC: 30.2 G/DL (ref 32–36)
MCV RBC AUTO: 93 FL (ref 82–98)
MONOCYTES # BLD AUTO: 0.9 K/UL (ref 0.3–1)
MONOCYTES NFR BLD: 12.3 % (ref 4–15)
NEUTROPHILS # BLD AUTO: 3.5 K/UL (ref 1.8–7.7)
NEUTROPHILS NFR BLD: 47.8 % (ref 38–73)
NITRITE, POC UA: NEGATIVE
NONHDLC SERPL-MCNC: 113 MG/DL
NRBC BLD-RTO: 0 /100 WBC
PH, POC UA: 5
PLATELET # BLD AUTO: 403 K/UL (ref 150–350)
PMV BLD AUTO: 10.6 FL (ref 9.2–12.9)
POTASSIUM SERPL-SCNC: 4.5 MMOL/L (ref 3.5–5.1)
PROT SERPL-MCNC: 8.1 G/DL (ref 6–8.4)
PROTEIN, POC: ABNORMAL
RBC # BLD AUTO: 4.4 M/UL (ref 4–5.4)
SODIUM SERPL-SCNC: 141 MMOL/L (ref 136–145)
SPECIFIC GRAVITY, POC UA: 1.01
TRIGL SERPL-MCNC: 138 MG/DL (ref 30–150)
TSH SERPL DL<=0.005 MIU/L-ACNC: 1.04 UIU/ML (ref 0.4–4)
UROBILINOGEN, POC UA: NORMAL
WBC # BLD AUTO: 7.4 K/UL (ref 3.9–12.7)

## 2021-01-08 PROCEDURE — 1159F MED LIST DOCD IN RCRD: CPT | Mod: S$GLB,,, | Performed by: PHYSICIAN ASSISTANT

## 2021-01-08 PROCEDURE — 3288F PR FALLS RISK ASSESSMENT DOCUMENTED: ICD-10-PCS | Mod: CPTII,S$GLB,, | Performed by: PHYSICIAN ASSISTANT

## 2021-01-08 PROCEDURE — 3288F FALL RISK ASSESSMENT DOCD: CPT | Mod: CPTII,S$GLB,, | Performed by: PHYSICIAN ASSISTANT

## 2021-01-08 PROCEDURE — 99999 PR PBB SHADOW E&M-EST. PATIENT-LVL IV: CPT | Mod: PBBFAC,,, | Performed by: PHYSICIAN ASSISTANT

## 2021-01-08 PROCEDURE — 1101F PT FALLS ASSESS-DOCD LE1/YR: CPT | Mod: CPTII,S$GLB,, | Performed by: PHYSICIAN ASSISTANT

## 2021-01-08 PROCEDURE — 3079F PR MOST RECENT DIASTOLIC BLOOD PRESSURE 80-89 MM HG: ICD-10-PCS | Mod: CPTII,S$GLB,, | Performed by: PHYSICIAN ASSISTANT

## 2021-01-08 PROCEDURE — 3075F SYST BP GE 130 - 139MM HG: CPT | Mod: CPTII,S$GLB,, | Performed by: PHYSICIAN ASSISTANT

## 2021-01-08 PROCEDURE — 83735 ASSAY OF MAGNESIUM: CPT

## 2021-01-08 PROCEDURE — 80053 COMPREHEN METABOLIC PANEL: CPT

## 2021-01-08 PROCEDURE — 84443 ASSAY THYROID STIM HORMONE: CPT

## 2021-01-08 PROCEDURE — 3075F PR MOST RECENT SYSTOLIC BLOOD PRESS GE 130-139MM HG: ICD-10-PCS | Mod: CPTII,S$GLB,, | Performed by: PHYSICIAN ASSISTANT

## 2021-01-08 PROCEDURE — 87086 URINE CULTURE/COLONY COUNT: CPT

## 2021-01-08 PROCEDURE — 1159F PR MEDICATION LIST DOCUMENTED IN MEDICAL RECORD: ICD-10-PCS | Mod: S$GLB,,, | Performed by: PHYSICIAN ASSISTANT

## 2021-01-08 PROCEDURE — 1125F AMNT PAIN NOTED PAIN PRSNT: CPT | Mod: S$GLB,,, | Performed by: PHYSICIAN ASSISTANT

## 2021-01-08 PROCEDURE — 83036 HEMOGLOBIN GLYCOSYLATED A1C: CPT

## 2021-01-08 PROCEDURE — 36415 COLL VENOUS BLD VENIPUNCTURE: CPT | Mod: PO

## 2021-01-08 PROCEDURE — 99999 PR PBB SHADOW E&M-EST. PATIENT-LVL IV: ICD-10-PCS | Mod: PBBFAC,,, | Performed by: PHYSICIAN ASSISTANT

## 2021-01-08 PROCEDURE — 1125F PR PAIN SEVERITY QUANTIFIED, PAIN PRESENT: ICD-10-PCS | Mod: S$GLB,,, | Performed by: PHYSICIAN ASSISTANT

## 2021-01-08 PROCEDURE — 82550 ASSAY OF CK (CPK): CPT

## 2021-01-08 PROCEDURE — 3008F BODY MASS INDEX DOCD: CPT | Mod: CPTII,S$GLB,, | Performed by: PHYSICIAN ASSISTANT

## 2021-01-08 PROCEDURE — 81002 URINALYSIS NONAUTO W/O SCOPE: CPT | Mod: S$GLB,,, | Performed by: PHYSICIAN ASSISTANT

## 2021-01-08 PROCEDURE — 85025 COMPLETE CBC W/AUTO DIFF WBC: CPT

## 2021-01-08 PROCEDURE — 99214 PR OFFICE/OUTPT VISIT, EST, LEVL IV, 30-39 MIN: ICD-10-PCS | Mod: 25,S$GLB,, | Performed by: PHYSICIAN ASSISTANT

## 2021-01-08 PROCEDURE — 99499 RISK ADDL DX/OHS AUDIT: ICD-10-PCS | Mod: S$GLB,,, | Performed by: PHYSICIAN ASSISTANT

## 2021-01-08 PROCEDURE — 99499 UNLISTED E&M SERVICE: CPT | Mod: S$GLB,,, | Performed by: PHYSICIAN ASSISTANT

## 2021-01-08 PROCEDURE — 1101F PR PT FALLS ASSESS DOC 0-1 FALLS W/OUT INJ PAST YR: ICD-10-PCS | Mod: CPTII,S$GLB,, | Performed by: PHYSICIAN ASSISTANT

## 2021-01-08 PROCEDURE — 3079F DIAST BP 80-89 MM HG: CPT | Mod: CPTII,S$GLB,, | Performed by: PHYSICIAN ASSISTANT

## 2021-01-08 PROCEDURE — 81002 POCT URINE DIPSTICK WITHOUT MICROSCOPE: ICD-10-PCS | Mod: S$GLB,,, | Performed by: PHYSICIAN ASSISTANT

## 2021-01-08 PROCEDURE — 3008F PR BODY MASS INDEX (BMI) DOCUMENTED: ICD-10-PCS | Mod: CPTII,S$GLB,, | Performed by: PHYSICIAN ASSISTANT

## 2021-01-08 PROCEDURE — 80061 LIPID PANEL: CPT

## 2021-01-08 PROCEDURE — 99214 OFFICE O/P EST MOD 30 MIN: CPT | Mod: 25,S$GLB,, | Performed by: PHYSICIAN ASSISTANT

## 2021-01-08 RX ORDER — TIZANIDINE 4 MG/1
4 TABLET ORAL 2 TIMES DAILY PRN
Qty: 20 TABLET | Refills: 0 | Status: SHIPPED | OUTPATIENT
Start: 2021-01-08 | End: 2021-05-11

## 2021-01-08 RX ORDER — INFLUENZA A VIRUS A/MICHIGAN/45/2015 X-275 (H1N1) ANTIGEN (FORMALDEHYDE INACTIVATED), INFLUENZA A VIRUS A/SINGAPORE/INFIMH-16-0019/2016 IVR-186 (H3N2) ANTIGEN (FORMALDEHYDE INACTIVATED), INFLUENZA B VIRUS B/PHUKET/3073/2013 ANTIGEN (FORMALDEHYDE INACTIVATED), AND INFLUENZA B VIRUS B/MARYLAND/15/2016 BX-69A ANTIGEN (FORMALDEHYDE INACTIVATED) 60; 60; 60; 60 UG/.7ML; UG/.7ML; UG/.7ML; UG/.7ML
INJECTION, SUSPENSION INTRAMUSCULAR
COMMUNITY
Start: 2020-10-23 | End: 2023-04-17

## 2021-01-09 LAB
BACTERIA UR CULT: NORMAL
BACTERIA UR CULT: NORMAL

## 2021-01-11 ENCOUNTER — TELEPHONE (OUTPATIENT)
Dept: FAMILY MEDICINE | Facility: CLINIC | Age: 76
End: 2021-01-11

## 2021-04-25 ENCOUNTER — HOSPITAL ENCOUNTER (EMERGENCY)
Facility: HOSPITAL | Age: 76
Discharge: HOME | End: 2021-04-25
Attending: STUDENT IN AN ORGANIZED HEALTH CARE EDUCATION/TRAINING PROGRAM
Payer: MEDICARE

## 2021-04-25 VITALS
BODY MASS INDEX: 31.74 KG/M2 | SYSTOLIC BLOOD PRESSURE: 138 MMHG | TEMPERATURE: 99 F | DIASTOLIC BLOOD PRESSURE: 63 MMHG | OXYGEN SATURATION: 99 % | WEIGHT: 168 LBS | HEART RATE: 62 BPM | RESPIRATION RATE: 20 BRPM

## 2021-04-25 DIAGNOSIS — Z87.39 HISTORY OF ARTHRITIS: ICD-10-CM

## 2021-04-25 DIAGNOSIS — M25.559 HIP PAIN: Primary | ICD-10-CM

## 2021-04-25 PROCEDURE — 25000003 PHARM REV CODE 250: Performed by: STUDENT IN AN ORGANIZED HEALTH CARE EDUCATION/TRAINING PROGRAM

## 2021-04-25 PROCEDURE — 99284 EMERGENCY DEPT VISIT MOD MDM: CPT | Mod: 25

## 2021-04-25 RX ORDER — HYDRALAZINE HYDROCHLORIDE 25 MG/1
100 TABLET, FILM COATED ORAL
Status: COMPLETED | OUTPATIENT
Start: 2021-04-25 | End: 2021-04-25

## 2021-04-25 RX ORDER — CAPSAICIN 0.03 G/100G
CREAM TOPICAL ONCE
Status: COMPLETED | OUTPATIENT
Start: 2021-04-25 | End: 2021-04-25

## 2021-04-25 RX ORDER — HYDROCODONE BITARTRATE AND ACETAMINOPHEN 5; 325 MG/1; MG/1
1 TABLET ORAL
Status: COMPLETED | OUTPATIENT
Start: 2021-04-25 | End: 2021-04-25

## 2021-04-25 RX ORDER — IBUPROFEN 400 MG/1
800 TABLET ORAL
Status: COMPLETED | OUTPATIENT
Start: 2021-04-25 | End: 2021-04-25

## 2021-04-25 RX ORDER — HYDROCODONE BITARTRATE AND ACETAMINOPHEN 5; 325 MG/1; MG/1
1 TABLET ORAL EVERY 6 HOURS PRN
Qty: 12 TABLET | Refills: 0 | Status: SHIPPED | OUTPATIENT
Start: 2021-04-25 | End: 2021-04-28

## 2021-04-25 RX ADMIN — IBUPROFEN 800 MG: 400 TABLET, FILM COATED ORAL at 10:04

## 2021-04-25 RX ADMIN — HYDRALAZINE HYDROCHLORIDE 100 MG: 25 TABLET, FILM COATED ORAL at 10:04

## 2021-04-25 RX ADMIN — HYDROCODONE BITARTRATE AND ACETAMINOPHEN 1 TABLET: 5; 325 TABLET ORAL at 12:04

## 2021-04-25 RX ADMIN — CAPSAICIN: 0.25 CREAM TOPICAL at 11:04

## 2021-04-26 ENCOUNTER — TELEPHONE (OUTPATIENT)
Dept: SPORTS MEDICINE | Facility: CLINIC | Age: 76
End: 2021-04-26

## 2021-04-27 ENCOUNTER — OFFICE VISIT (OUTPATIENT)
Dept: ORTHOPEDICS | Facility: CLINIC | Age: 76
End: 2021-04-27
Payer: MEDICARE

## 2021-04-27 ENCOUNTER — HOSPITAL ENCOUNTER (OUTPATIENT)
Dept: RADIOLOGY | Facility: HOSPITAL | Age: 76
Discharge: HOME | End: 2021-04-27
Attending: PHYSICIAN ASSISTANT
Payer: MEDICARE

## 2021-04-27 VITALS
HEART RATE: 80 BPM | WEIGHT: 168 LBS | DIASTOLIC BLOOD PRESSURE: 80 MMHG | BODY MASS INDEX: 31.72 KG/M2 | HEIGHT: 61 IN | SYSTOLIC BLOOD PRESSURE: 169 MMHG

## 2021-04-27 DIAGNOSIS — M70.61 TROCHANTERIC BURSITIS OF RIGHT HIP: ICD-10-CM

## 2021-04-27 DIAGNOSIS — M48.07 SPINAL STENOSIS, LUMBOSACRAL REGION: ICD-10-CM

## 2021-04-27 DIAGNOSIS — M54.41 ACUTE RIGHT-SIDED BACK PAIN WITH SCIATICA: Primary | ICD-10-CM

## 2021-04-27 PROCEDURE — 99203 PR OFFICE/OUTPT VISIT, NEW, LEVL III, 30-44 MIN: ICD-10-PCS | Mod: 25,S$GLB,, | Performed by: PHYSICIAN ASSISTANT

## 2021-04-27 PROCEDURE — 3288F FALL RISK ASSESSMENT DOCD: CPT | Mod: CPTII,S$GLB,, | Performed by: PHYSICIAN ASSISTANT

## 2021-04-27 PROCEDURE — 1159F MED LIST DOCD IN RCRD: CPT | Mod: S$GLB,,, | Performed by: PHYSICIAN ASSISTANT

## 2021-04-27 PROCEDURE — 1101F PT FALLS ASSESS-DOCD LE1/YR: CPT | Mod: CPTII,S$GLB,, | Performed by: PHYSICIAN ASSISTANT

## 2021-04-27 PROCEDURE — 72110 XR LUMBAR SPINE 5 VIEW WITH FLEX AND EXT: ICD-10-PCS | Mod: 26,,, | Performed by: RADIOLOGY

## 2021-04-27 PROCEDURE — 20610 DRAIN/INJ JOINT/BURSA W/O US: CPT | Mod: RT,S$GLB,, | Performed by: PHYSICIAN ASSISTANT

## 2021-04-27 PROCEDURE — 1159F PR MEDICATION LIST DOCUMENTED IN MEDICAL RECORD: ICD-10-PCS | Mod: S$GLB,,, | Performed by: PHYSICIAN ASSISTANT

## 2021-04-27 PROCEDURE — 3008F BODY MASS INDEX DOCD: CPT | Mod: CPTII,S$GLB,, | Performed by: PHYSICIAN ASSISTANT

## 2021-04-27 PROCEDURE — 72110 X-RAY EXAM L-2 SPINE 4/>VWS: CPT | Mod: 26,,, | Performed by: RADIOLOGY

## 2021-04-27 PROCEDURE — 99203 OFFICE O/P NEW LOW 30 MIN: CPT | Mod: 25,S$GLB,, | Performed by: PHYSICIAN ASSISTANT

## 2021-04-27 PROCEDURE — 20610 PR DRAIN/INJECT LARGE JOINT/BURSA: ICD-10-PCS | Mod: RT,S$GLB,, | Performed by: PHYSICIAN ASSISTANT

## 2021-04-27 PROCEDURE — 1125F AMNT PAIN NOTED PAIN PRSNT: CPT | Mod: S$GLB,,, | Performed by: PHYSICIAN ASSISTANT

## 2021-04-27 PROCEDURE — 3288F PR FALLS RISK ASSESSMENT DOCUMENTED: ICD-10-PCS | Mod: CPTII,S$GLB,, | Performed by: PHYSICIAN ASSISTANT

## 2021-04-27 PROCEDURE — 72110 X-RAY EXAM L-2 SPINE 4/>VWS: CPT | Mod: TC

## 2021-04-27 PROCEDURE — 99999 PR PBB SHADOW E&M-EST. PATIENT-LVL IV: ICD-10-PCS | Mod: PBBFAC,,, | Performed by: PHYSICIAN ASSISTANT

## 2021-04-27 PROCEDURE — 1125F PR PAIN SEVERITY QUANTIFIED, PAIN PRESENT: ICD-10-PCS | Mod: S$GLB,,, | Performed by: PHYSICIAN ASSISTANT

## 2021-04-27 PROCEDURE — 1101F PR PT FALLS ASSESS DOC 0-1 FALLS W/OUT INJ PAST YR: ICD-10-PCS | Mod: CPTII,S$GLB,, | Performed by: PHYSICIAN ASSISTANT

## 2021-04-27 PROCEDURE — 99999 PR PBB SHADOW E&M-EST. PATIENT-LVL IV: CPT | Mod: PBBFAC,,, | Performed by: PHYSICIAN ASSISTANT

## 2021-04-27 PROCEDURE — 3008F PR BODY MASS INDEX (BMI) DOCUMENTED: ICD-10-PCS | Mod: CPTII,S$GLB,, | Performed by: PHYSICIAN ASSISTANT

## 2021-04-27 RX ORDER — BETAMETHASONE SODIUM PHOSPHATE AND BETAMETHASONE ACETATE 3; 3 MG/ML; MG/ML
6 INJECTION, SUSPENSION INTRA-ARTICULAR; INTRALESIONAL; INTRAMUSCULAR; SOFT TISSUE
Status: COMPLETED | OUTPATIENT
Start: 2021-04-27 | End: 2021-04-27

## 2021-04-27 RX ADMIN — BETAMETHASONE SODIUM PHOSPHATE AND BETAMETHASONE ACETATE 6 MG: 3; 3 INJECTION, SUSPENSION INTRA-ARTICULAR; INTRALESIONAL; INTRAMUSCULAR; SOFT TISSUE at 03:04

## 2021-05-03 ENCOUNTER — TELEPHONE (OUTPATIENT)
Dept: ORTHOPEDICS | Facility: CLINIC | Age: 76
End: 2021-05-03

## 2021-05-04 ENCOUNTER — HOSPITAL ENCOUNTER (OUTPATIENT)
Dept: RADIOLOGY | Facility: HOSPITAL | Age: 76
Discharge: HOME | End: 2021-05-04
Attending: PHYSICIAN ASSISTANT
Payer: MEDICARE

## 2021-05-04 ENCOUNTER — OFFICE VISIT (OUTPATIENT)
Dept: ORTHOPEDICS | Facility: CLINIC | Age: 76
End: 2021-05-04
Payer: MEDICARE

## 2021-05-04 VITALS — DIASTOLIC BLOOD PRESSURE: 73 MMHG | SYSTOLIC BLOOD PRESSURE: 154 MMHG | HEART RATE: 80 BPM

## 2021-05-04 DIAGNOSIS — M79.671 RIGHT FOOT PAIN: ICD-10-CM

## 2021-05-04 DIAGNOSIS — M25.561 ACUTE PAIN OF RIGHT KNEE: ICD-10-CM

## 2021-05-04 DIAGNOSIS — M17.11 PRIMARY OSTEOARTHRITIS OF RIGHT KNEE: Primary | ICD-10-CM

## 2021-05-04 PROCEDURE — 73560 X-RAY EXAM OF KNEE 1 OR 2: CPT | Mod: TC,LT,59

## 2021-05-04 PROCEDURE — 73630 X-RAY EXAM OF FOOT: CPT | Mod: 26,RT,, | Performed by: RADIOLOGY

## 2021-05-04 PROCEDURE — 1159F PR MEDICATION LIST DOCUMENTED IN MEDICAL RECORD: ICD-10-PCS | Mod: S$GLB,,, | Performed by: PHYSICIAN ASSISTANT

## 2021-05-04 PROCEDURE — 73560 XR KNEE ORTHO RIGHT: ICD-10-PCS | Mod: 26,LT,, | Performed by: RADIOLOGY

## 2021-05-04 PROCEDURE — 1125F PR PAIN SEVERITY QUANTIFIED, PAIN PRESENT: ICD-10-PCS | Mod: S$GLB,,, | Performed by: PHYSICIAN ASSISTANT

## 2021-05-04 PROCEDURE — 73630 X-RAY EXAM OF FOOT: CPT | Mod: TC,RT

## 2021-05-04 PROCEDURE — 20610 PR DRAIN/INJECT LARGE JOINT/BURSA: ICD-10-PCS | Mod: RT,S$GLB,, | Performed by: PHYSICIAN ASSISTANT

## 2021-05-04 PROCEDURE — 99999 PR PBB SHADOW E&M-EST. PATIENT-LVL III: CPT | Mod: PBBFAC,,, | Performed by: PHYSICIAN ASSISTANT

## 2021-05-04 PROCEDURE — 73560 X-RAY EXAM OF KNEE 1 OR 2: CPT | Mod: 26,LT,, | Performed by: RADIOLOGY

## 2021-05-04 PROCEDURE — 1125F AMNT PAIN NOTED PAIN PRSNT: CPT | Mod: S$GLB,,, | Performed by: PHYSICIAN ASSISTANT

## 2021-05-04 PROCEDURE — 99999 PR PBB SHADOW E&M-EST. PATIENT-LVL III: ICD-10-PCS | Mod: PBBFAC,,, | Performed by: PHYSICIAN ASSISTANT

## 2021-05-04 PROCEDURE — 99213 PR OFFICE/OUTPT VISIT, EST, LEVL III, 20-29 MIN: ICD-10-PCS | Mod: 25,S$GLB,, | Performed by: PHYSICIAN ASSISTANT

## 2021-05-04 PROCEDURE — 20610 DRAIN/INJ JOINT/BURSA W/O US: CPT | Mod: RT,S$GLB,, | Performed by: PHYSICIAN ASSISTANT

## 2021-05-04 PROCEDURE — 73562 XR KNEE ORTHO RIGHT: ICD-10-PCS | Mod: 26,RT,, | Performed by: RADIOLOGY

## 2021-05-04 PROCEDURE — 73630 XR FOOT COMPLETE 3 VIEW RIGHT: ICD-10-PCS | Mod: 26,RT,, | Performed by: RADIOLOGY

## 2021-05-04 PROCEDURE — 73562 X-RAY EXAM OF KNEE 3: CPT | Mod: 26,RT,, | Performed by: RADIOLOGY

## 2021-05-04 PROCEDURE — 99213 OFFICE O/P EST LOW 20 MIN: CPT | Mod: 25,S$GLB,, | Performed by: PHYSICIAN ASSISTANT

## 2021-05-04 PROCEDURE — 1159F MED LIST DOCD IN RCRD: CPT | Mod: S$GLB,,, | Performed by: PHYSICIAN ASSISTANT

## 2021-05-04 RX ORDER — BETAMETHASONE SODIUM PHOSPHATE AND BETAMETHASONE ACETATE 3; 3 MG/ML; MG/ML
6 INJECTION, SUSPENSION INTRA-ARTICULAR; INTRALESIONAL; INTRAMUSCULAR; SOFT TISSUE
Status: COMPLETED | OUTPATIENT
Start: 2021-05-04 | End: 2021-05-04

## 2021-05-04 RX ADMIN — BETAMETHASONE SODIUM PHOSPHATE AND BETAMETHASONE ACETATE 6 MG: 3; 3 INJECTION, SUSPENSION INTRA-ARTICULAR; INTRALESIONAL; INTRAMUSCULAR; SOFT TISSUE at 01:05

## 2021-05-05 RX ORDER — HYDRALAZINE HYDROCHLORIDE 100 MG/1
100 TABLET, FILM COATED ORAL 3 TIMES DAILY
Qty: 90 TABLET | Refills: 3 | Status: CANCELLED | OUTPATIENT
Start: 2021-05-05

## 2021-05-18 ENCOUNTER — OFFICE VISIT (OUTPATIENT)
Dept: FAMILY MEDICINE | Facility: CLINIC | Age: 76
End: 2021-05-18
Payer: MEDICARE

## 2021-05-18 VITALS
HEART RATE: 75 BPM | DIASTOLIC BLOOD PRESSURE: 86 MMHG | HEIGHT: 61 IN | RESPIRATION RATE: 16 BRPM | WEIGHT: 168 LBS | TEMPERATURE: 99 F | SYSTOLIC BLOOD PRESSURE: 138 MMHG | OXYGEN SATURATION: 97 % | BODY MASS INDEX: 31.72 KG/M2

## 2021-05-18 DIAGNOSIS — M79.89 LEG SWELLING: ICD-10-CM

## 2021-05-18 DIAGNOSIS — R22.41 LOCALIZED SWELLING, MASS AND LUMP, RIGHT LOWER LIMB: ICD-10-CM

## 2021-05-18 DIAGNOSIS — M25.561 CHRONIC PAIN OF RIGHT KNEE: Primary | ICD-10-CM

## 2021-05-18 DIAGNOSIS — G89.29 CHRONIC PAIN OF RIGHT KNEE: Primary | ICD-10-CM

## 2021-05-18 PROCEDURE — 3008F BODY MASS INDEX DOCD: CPT | Mod: CPTII,S$GLB,, | Performed by: INTERNAL MEDICINE

## 2021-05-18 PROCEDURE — 99214 PR OFFICE/OUTPT VISIT, EST, LEVL IV, 30-39 MIN: ICD-10-PCS | Mod: S$GLB,,, | Performed by: INTERNAL MEDICINE

## 2021-05-18 PROCEDURE — 99999 PR PBB SHADOW E&M-EST. PATIENT-LVL IV: ICD-10-PCS | Mod: PBBFAC,,, | Performed by: INTERNAL MEDICINE

## 2021-05-18 PROCEDURE — 1100F PR PT FALLS ASSESS DOC 2+ FALLS/FALL W/INJURY/YR: ICD-10-PCS | Mod: CPTII,S$GLB,, | Performed by: INTERNAL MEDICINE

## 2021-05-18 PROCEDURE — 1100F PTFALLS ASSESS-DOCD GE2>/YR: CPT | Mod: CPTII,S$GLB,, | Performed by: INTERNAL MEDICINE

## 2021-05-18 PROCEDURE — 3008F PR BODY MASS INDEX (BMI) DOCUMENTED: ICD-10-PCS | Mod: CPTII,S$GLB,, | Performed by: INTERNAL MEDICINE

## 2021-05-18 PROCEDURE — 3288F FALL RISK ASSESSMENT DOCD: CPT | Mod: CPTII,S$GLB,, | Performed by: INTERNAL MEDICINE

## 2021-05-18 PROCEDURE — 1159F MED LIST DOCD IN RCRD: CPT | Mod: S$GLB,,, | Performed by: INTERNAL MEDICINE

## 2021-05-18 PROCEDURE — 1125F PR PAIN SEVERITY QUANTIFIED, PAIN PRESENT: ICD-10-PCS | Mod: S$GLB,,, | Performed by: INTERNAL MEDICINE

## 2021-05-18 PROCEDURE — 1125F AMNT PAIN NOTED PAIN PRSNT: CPT | Mod: S$GLB,,, | Performed by: INTERNAL MEDICINE

## 2021-05-18 PROCEDURE — 99214 OFFICE O/P EST MOD 30 MIN: CPT | Mod: S$GLB,,, | Performed by: INTERNAL MEDICINE

## 2021-05-18 PROCEDURE — 1159F PR MEDICATION LIST DOCUMENTED IN MEDICAL RECORD: ICD-10-PCS | Mod: S$GLB,,, | Performed by: INTERNAL MEDICINE

## 2021-05-18 PROCEDURE — 99999 PR PBB SHADOW E&M-EST. PATIENT-LVL IV: CPT | Mod: PBBFAC,,, | Performed by: INTERNAL MEDICINE

## 2021-05-18 PROCEDURE — 3288F PR FALLS RISK ASSESSMENT DOCUMENTED: ICD-10-PCS | Mod: CPTII,S$GLB,, | Performed by: INTERNAL MEDICINE

## 2021-05-18 RX ORDER — MELOXICAM 15 MG/1
15 TABLET ORAL DAILY PRN
Qty: 30 TABLET | Refills: 1 | Status: SHIPPED | OUTPATIENT
Start: 2021-05-18 | End: 2021-10-04

## 2021-05-18 RX ORDER — PANTOPRAZOLE SODIUM 40 MG/1
40 TABLET, DELAYED RELEASE ORAL DAILY
Qty: 30 TABLET | Refills: 11 | Status: SHIPPED | OUTPATIENT
Start: 2021-05-18 | End: 2021-10-04

## 2021-05-19 ENCOUNTER — TELEPHONE (OUTPATIENT)
Dept: FAMILY MEDICINE | Facility: CLINIC | Age: 76
End: 2021-05-19

## 2021-05-19 ENCOUNTER — HOSPITAL ENCOUNTER (OUTPATIENT)
Dept: RADIOLOGY | Facility: HOSPITAL | Age: 76
Discharge: HOME | End: 2021-05-19
Attending: INTERNAL MEDICINE
Payer: MEDICARE

## 2021-05-19 DIAGNOSIS — R22.41 LOCALIZED SWELLING, MASS AND LUMP, RIGHT LOWER LIMB: ICD-10-CM

## 2021-05-19 PROCEDURE — 93971 US LOWER EXTREMITY VEINS RIGHT: ICD-10-PCS | Mod: 26,RT,, | Performed by: RADIOLOGY

## 2021-05-19 PROCEDURE — 93971 EXTREMITY STUDY: CPT | Mod: TC,RT

## 2021-05-19 PROCEDURE — 93971 EXTREMITY STUDY: CPT | Mod: 26,RT,, | Performed by: RADIOLOGY

## 2021-06-04 ENCOUNTER — HOSPITAL ENCOUNTER (OUTPATIENT)
Dept: RADIOLOGY | Facility: CLINIC | Age: 76
Discharge: HOME | End: 2021-06-04
Attending: PHYSICIAN ASSISTANT
Payer: MEDICARE

## 2021-06-04 ENCOUNTER — OFFICE VISIT (OUTPATIENT)
Dept: ORTHOPEDICS | Facility: CLINIC | Age: 76
End: 2021-06-04
Payer: MEDICARE

## 2021-06-04 VITALS
HEART RATE: 77 BPM | HEIGHT: 61 IN | SYSTOLIC BLOOD PRESSURE: 172 MMHG | BODY MASS INDEX: 32.76 KG/M2 | DIASTOLIC BLOOD PRESSURE: 79 MMHG | WEIGHT: 173.5 LBS

## 2021-06-04 DIAGNOSIS — Z78.0 POSTMENOPAUSAL: ICD-10-CM

## 2021-06-04 DIAGNOSIS — M25.561 ACUTE PAIN OF RIGHT KNEE: Primary | ICD-10-CM

## 2021-06-04 DIAGNOSIS — M17.11 PRIMARY OSTEOARTHRITIS OF RIGHT KNEE: ICD-10-CM

## 2021-06-04 PROCEDURE — 77080 DEXA BONE DENSITY SPINE HIP: ICD-10-PCS | Mod: 26,,, | Performed by: INTERNAL MEDICINE

## 2021-06-04 PROCEDURE — 99213 OFFICE O/P EST LOW 20 MIN: CPT | Mod: S$GLB,,, | Performed by: PHYSICIAN ASSISTANT

## 2021-06-04 PROCEDURE — 3008F PR BODY MASS INDEX (BMI) DOCUMENTED: ICD-10-PCS | Mod: CPTII,S$GLB,, | Performed by: PHYSICIAN ASSISTANT

## 2021-06-04 PROCEDURE — 99213 PR OFFICE/OUTPT VISIT, EST, LEVL III, 20-29 MIN: ICD-10-PCS | Mod: S$GLB,,, | Performed by: PHYSICIAN ASSISTANT

## 2021-06-04 PROCEDURE — 1125F PR PAIN SEVERITY QUANTIFIED, PAIN PRESENT: ICD-10-PCS | Mod: S$GLB,,, | Performed by: PHYSICIAN ASSISTANT

## 2021-06-04 PROCEDURE — 1159F PR MEDICATION LIST DOCUMENTED IN MEDICAL RECORD: ICD-10-PCS | Mod: S$GLB,,, | Performed by: PHYSICIAN ASSISTANT

## 2021-06-04 PROCEDURE — 77080 DXA BONE DENSITY AXIAL: CPT | Mod: TC

## 2021-06-04 PROCEDURE — 99999 PR PBB SHADOW E&M-EST. PATIENT-LVL IV: ICD-10-PCS | Mod: PBBFAC,,, | Performed by: PHYSICIAN ASSISTANT

## 2021-06-04 PROCEDURE — 1159F MED LIST DOCD IN RCRD: CPT | Mod: S$GLB,,, | Performed by: PHYSICIAN ASSISTANT

## 2021-06-04 PROCEDURE — 77080 DXA BONE DENSITY AXIAL: CPT | Mod: 26,,, | Performed by: INTERNAL MEDICINE

## 2021-06-04 PROCEDURE — 99999 PR PBB SHADOW E&M-EST. PATIENT-LVL IV: CPT | Mod: PBBFAC,,, | Performed by: PHYSICIAN ASSISTANT

## 2021-06-04 PROCEDURE — 1125F AMNT PAIN NOTED PAIN PRSNT: CPT | Mod: S$GLB,,, | Performed by: PHYSICIAN ASSISTANT

## 2021-06-04 PROCEDURE — 3008F BODY MASS INDEX DOCD: CPT | Mod: CPTII,S$GLB,, | Performed by: PHYSICIAN ASSISTANT

## 2021-06-04 RX ORDER — TRAMADOL HYDROCHLORIDE 50 MG/1
50 TABLET ORAL
Qty: 7 TABLET | Refills: 0 | Status: SHIPPED | OUTPATIENT
Start: 2021-06-04 | End: 2021-06-14

## 2021-06-30 ENCOUNTER — OFFICE VISIT (OUTPATIENT)
Dept: ORTHOPEDICS | Facility: CLINIC | Age: 76
End: 2021-06-30
Payer: MEDICARE

## 2021-06-30 ENCOUNTER — PATIENT OUTREACH (OUTPATIENT)
Dept: ADMINISTRATIVE | Facility: OTHER | Age: 76
End: 2021-06-30

## 2021-06-30 VITALS — SYSTOLIC BLOOD PRESSURE: 179 MMHG | DIASTOLIC BLOOD PRESSURE: 84 MMHG | HEART RATE: 89 BPM

## 2021-06-30 DIAGNOSIS — M17.11 PRIMARY OSTEOARTHRITIS OF RIGHT KNEE: ICD-10-CM

## 2021-06-30 DIAGNOSIS — M25.561 ACUTE PAIN OF RIGHT KNEE: Primary | ICD-10-CM

## 2021-06-30 PROCEDURE — 99213 OFFICE O/P EST LOW 20 MIN: CPT | Mod: S$GLB,,, | Performed by: PHYSICIAN ASSISTANT

## 2021-06-30 PROCEDURE — 99999 PR PBB SHADOW E&M-EST. PATIENT-LVL III: ICD-10-PCS | Mod: PBBFAC,,, | Performed by: PHYSICIAN ASSISTANT

## 2021-06-30 PROCEDURE — 99999 PR PBB SHADOW E&M-EST. PATIENT-LVL III: CPT | Mod: PBBFAC,,, | Performed by: PHYSICIAN ASSISTANT

## 2021-06-30 PROCEDURE — 99213 PR OFFICE/OUTPT VISIT, EST, LEVL III, 20-29 MIN: ICD-10-PCS | Mod: S$GLB,,, | Performed by: PHYSICIAN ASSISTANT

## 2021-06-30 PROCEDURE — 1125F PR PAIN SEVERITY QUANTIFIED, PAIN PRESENT: ICD-10-PCS | Mod: S$GLB,,, | Performed by: PHYSICIAN ASSISTANT

## 2021-06-30 PROCEDURE — 1159F PR MEDICATION LIST DOCUMENTED IN MEDICAL RECORD: ICD-10-PCS | Mod: S$GLB,,, | Performed by: PHYSICIAN ASSISTANT

## 2021-06-30 PROCEDURE — 1159F MED LIST DOCD IN RCRD: CPT | Mod: S$GLB,,, | Performed by: PHYSICIAN ASSISTANT

## 2021-06-30 PROCEDURE — 1125F AMNT PAIN NOTED PAIN PRSNT: CPT | Mod: S$GLB,,, | Performed by: PHYSICIAN ASSISTANT

## 2021-07-08 ENCOUNTER — HOSPITAL ENCOUNTER (OUTPATIENT)
Dept: RADIOLOGY | Facility: HOSPITAL | Age: 76
Discharge: HOME | End: 2021-07-08
Attending: PHYSICIAN ASSISTANT
Payer: MEDICARE

## 2021-07-08 DIAGNOSIS — M25.561 ACUTE PAIN OF RIGHT KNEE: ICD-10-CM

## 2021-07-08 PROCEDURE — 73721 MRI JNT OF LWR EXTRE W/O DYE: CPT | Mod: 26,RT,, | Performed by: RADIOLOGY

## 2021-07-08 PROCEDURE — 73721 MRI KNEE WITHOUT CONTRAST RIGHT: ICD-10-PCS | Mod: 26,RT,, | Performed by: RADIOLOGY

## 2021-07-08 PROCEDURE — 73721 MRI JNT OF LWR EXTRE W/O DYE: CPT | Mod: TC,RT

## 2021-07-13 ENCOUNTER — OFFICE VISIT (OUTPATIENT)
Dept: ORTHOPEDICS | Facility: CLINIC | Age: 76
End: 2021-07-13
Payer: MEDICARE

## 2021-07-13 DIAGNOSIS — M23.91 INTERNAL DERANGEMENT OF RIGHT KNEE: ICD-10-CM

## 2021-07-13 DIAGNOSIS — M17.11 PRIMARY OSTEOARTHRITIS OF RIGHT KNEE: Primary | ICD-10-CM

## 2021-07-13 PROCEDURE — 99213 OFFICE O/P EST LOW 20 MIN: CPT | Mod: S$GLB,,, | Performed by: PHYSICIAN ASSISTANT

## 2021-07-13 PROCEDURE — 99999 PR PBB SHADOW E&M-EST. PATIENT-LVL II: CPT | Mod: PBBFAC,,, | Performed by: PHYSICIAN ASSISTANT

## 2021-07-13 PROCEDURE — 1159F MED LIST DOCD IN RCRD: CPT | Mod: S$GLB,,, | Performed by: PHYSICIAN ASSISTANT

## 2021-07-13 PROCEDURE — 1159F PR MEDICATION LIST DOCUMENTED IN MEDICAL RECORD: ICD-10-PCS | Mod: S$GLB,,, | Performed by: PHYSICIAN ASSISTANT

## 2021-07-13 PROCEDURE — 99213 PR OFFICE/OUTPT VISIT, EST, LEVL III, 20-29 MIN: ICD-10-PCS | Mod: S$GLB,,, | Performed by: PHYSICIAN ASSISTANT

## 2021-07-13 PROCEDURE — 99999 PR PBB SHADOW E&M-EST. PATIENT-LVL II: ICD-10-PCS | Mod: PBBFAC,,, | Performed by: PHYSICIAN ASSISTANT

## 2021-08-11 ENCOUNTER — OFFICE VISIT (OUTPATIENT)
Dept: ORTHOPEDICS | Facility: CLINIC | Age: 76
End: 2021-08-11
Payer: MEDICARE

## 2021-08-11 VITALS — HEART RATE: 65 BPM | SYSTOLIC BLOOD PRESSURE: 160 MMHG | DIASTOLIC BLOOD PRESSURE: 80 MMHG

## 2021-08-11 DIAGNOSIS — M17.11 PRIMARY OSTEOARTHRITIS OF RIGHT KNEE: Primary | ICD-10-CM

## 2021-08-11 PROCEDURE — 3079F DIAST BP 80-89 MM HG: CPT | Mod: CPTII,S$GLB,, | Performed by: PHYSICIAN ASSISTANT

## 2021-08-11 PROCEDURE — 3044F PR MOST RECENT HEMOGLOBIN A1C LEVEL <7.0%: ICD-10-PCS | Mod: CPTII,S$GLB,, | Performed by: PHYSICIAN ASSISTANT

## 2021-08-11 PROCEDURE — 3044F HG A1C LEVEL LT 7.0%: CPT | Mod: CPTII,S$GLB,, | Performed by: PHYSICIAN ASSISTANT

## 2021-08-11 PROCEDURE — 99999 PR PBB SHADOW E&M-EST. PATIENT-LVL III: ICD-10-PCS | Mod: PBBFAC,,, | Performed by: PHYSICIAN ASSISTANT

## 2021-08-11 PROCEDURE — 1125F AMNT PAIN NOTED PAIN PRSNT: CPT | Mod: CPTII,S$GLB,, | Performed by: PHYSICIAN ASSISTANT

## 2021-08-11 PROCEDURE — 1125F PR PAIN SEVERITY QUANTIFIED, PAIN PRESENT: ICD-10-PCS | Mod: CPTII,S$GLB,, | Performed by: PHYSICIAN ASSISTANT

## 2021-08-11 PROCEDURE — 99499 UNLISTED E&M SERVICE: CPT | Mod: S$GLB,,, | Performed by: PHYSICIAN ASSISTANT

## 2021-08-11 PROCEDURE — 3077F SYST BP >= 140 MM HG: CPT | Mod: CPTII,S$GLB,, | Performed by: PHYSICIAN ASSISTANT

## 2021-08-11 PROCEDURE — 1159F PR MEDICATION LIST DOCUMENTED IN MEDICAL RECORD: ICD-10-PCS | Mod: CPTII,S$GLB,, | Performed by: PHYSICIAN ASSISTANT

## 2021-08-11 PROCEDURE — 99499 NO LOS: ICD-10-PCS | Mod: S$GLB,,, | Performed by: PHYSICIAN ASSISTANT

## 2021-08-11 PROCEDURE — 3079F PR MOST RECENT DIASTOLIC BLOOD PRESSURE 80-89 MM HG: ICD-10-PCS | Mod: CPTII,S$GLB,, | Performed by: PHYSICIAN ASSISTANT

## 2021-08-11 PROCEDURE — 3077F PR MOST RECENT SYSTOLIC BLOOD PRESSURE >= 140 MM HG: ICD-10-PCS | Mod: CPTII,S$GLB,, | Performed by: PHYSICIAN ASSISTANT

## 2021-08-11 PROCEDURE — 1159F MED LIST DOCD IN RCRD: CPT | Mod: CPTII,S$GLB,, | Performed by: PHYSICIAN ASSISTANT

## 2021-08-11 PROCEDURE — 99999 PR PBB SHADOW E&M-EST. PATIENT-LVL III: CPT | Mod: PBBFAC,,, | Performed by: PHYSICIAN ASSISTANT

## 2021-09-17 ENCOUNTER — TELEPHONE (OUTPATIENT)
Dept: ADMINISTRATIVE | Facility: OTHER | Age: 76
End: 2021-09-17

## 2021-10-04 ENCOUNTER — OFFICE VISIT (OUTPATIENT)
Dept: SPORTS MEDICINE | Facility: CLINIC | Age: 76
End: 2021-10-04
Payer: MEDICARE

## 2021-10-04 ENCOUNTER — TELEPHONE (OUTPATIENT)
Dept: FAMILY MEDICINE | Facility: CLINIC | Age: 76
End: 2021-10-04

## 2021-10-04 VITALS
HEART RATE: 80 BPM | DIASTOLIC BLOOD PRESSURE: 91 MMHG | HEIGHT: 61 IN | WEIGHT: 172.69 LBS | BODY MASS INDEX: 32.6 KG/M2 | SYSTOLIC BLOOD PRESSURE: 187 MMHG

## 2021-10-04 DIAGNOSIS — S83.241A ACUTE MEDIAL MENISCAL TEAR, RIGHT, INITIAL ENCOUNTER: ICD-10-CM

## 2021-10-04 DIAGNOSIS — M70.61 TROCHANTERIC BURSITIS OF RIGHT HIP: Primary | ICD-10-CM

## 2021-10-04 PROCEDURE — 3077F PR MOST RECENT SYSTOLIC BLOOD PRESSURE >= 140 MM HG: ICD-10-PCS | Mod: CPTII,S$GLB,, | Performed by: ORTHOPAEDIC SURGERY

## 2021-10-04 PROCEDURE — 1160F RVW MEDS BY RX/DR IN RCRD: CPT | Mod: CPTII,S$GLB,, | Performed by: ORTHOPAEDIC SURGERY

## 2021-10-04 PROCEDURE — 4010F ACE/ARB THERAPY RXD/TAKEN: CPT | Mod: CPTII,S$GLB,, | Performed by: ORTHOPAEDIC SURGERY

## 2021-10-04 PROCEDURE — 3288F FALL RISK ASSESSMENT DOCD: CPT | Mod: CPTII,S$GLB,, | Performed by: ORTHOPAEDIC SURGERY

## 2021-10-04 PROCEDURE — 3288F PR FALLS RISK ASSESSMENT DOCUMENTED: ICD-10-PCS | Mod: CPTII,S$GLB,, | Performed by: ORTHOPAEDIC SURGERY

## 2021-10-04 PROCEDURE — 1100F PR PT FALLS ASSESS DOC 2+ FALLS/FALL W/INJURY/YR: ICD-10-PCS | Mod: CPTII,S$GLB,, | Performed by: ORTHOPAEDIC SURGERY

## 2021-10-04 PROCEDURE — 3008F PR BODY MASS INDEX (BMI) DOCUMENTED: ICD-10-PCS | Mod: CPTII,S$GLB,, | Performed by: ORTHOPAEDIC SURGERY

## 2021-10-04 PROCEDURE — 4010F PR ACE/ARB THEARPY RXD/TAKEN: ICD-10-PCS | Mod: CPTII,S$GLB,, | Performed by: ORTHOPAEDIC SURGERY

## 2021-10-04 PROCEDURE — 3008F BODY MASS INDEX DOCD: CPT | Mod: CPTII,S$GLB,, | Performed by: ORTHOPAEDIC SURGERY

## 2021-10-04 PROCEDURE — 1100F PTFALLS ASSESS-DOCD GE2>/YR: CPT | Mod: CPTII,S$GLB,, | Performed by: ORTHOPAEDIC SURGERY

## 2021-10-04 PROCEDURE — 97110 PR THERAPEUTIC EXERCISES: ICD-10-PCS | Mod: GP,S$GLB,, | Performed by: ORTHOPAEDIC SURGERY

## 2021-10-04 PROCEDURE — 1125F PR PAIN SEVERITY QUANTIFIED, PAIN PRESENT: ICD-10-PCS | Mod: CPTII,S$GLB,, | Performed by: ORTHOPAEDIC SURGERY

## 2021-10-04 PROCEDURE — 99999 PR PBB SHADOW E&M-EST. PATIENT-LVL IV: ICD-10-PCS | Mod: PBBFAC,,, | Performed by: ORTHOPAEDIC SURGERY

## 2021-10-04 PROCEDURE — 1159F PR MEDICATION LIST DOCUMENTED IN MEDICAL RECORD: ICD-10-PCS | Mod: CPTII,S$GLB,, | Performed by: ORTHOPAEDIC SURGERY

## 2021-10-04 PROCEDURE — 3077F SYST BP >= 140 MM HG: CPT | Mod: CPTII,S$GLB,, | Performed by: ORTHOPAEDIC SURGERY

## 2021-10-04 PROCEDURE — 3080F PR MOST RECENT DIASTOLIC BLOOD PRESSURE >= 90 MM HG: ICD-10-PCS | Mod: CPTII,S$GLB,, | Performed by: ORTHOPAEDIC SURGERY

## 2021-10-04 PROCEDURE — 1125F AMNT PAIN NOTED PAIN PRSNT: CPT | Mod: CPTII,S$GLB,, | Performed by: ORTHOPAEDIC SURGERY

## 2021-10-04 PROCEDURE — 99204 OFFICE O/P NEW MOD 45 MIN: CPT | Mod: 25,S$GLB,, | Performed by: ORTHOPAEDIC SURGERY

## 2021-10-04 PROCEDURE — 97110 THERAPEUTIC EXERCISES: CPT | Mod: GP,S$GLB,, | Performed by: ORTHOPAEDIC SURGERY

## 2021-10-04 PROCEDURE — 3044F HG A1C LEVEL LT 7.0%: CPT | Mod: CPTII,S$GLB,, | Performed by: ORTHOPAEDIC SURGERY

## 2021-10-04 PROCEDURE — 99999 PR PBB SHADOW E&M-EST. PATIENT-LVL IV: CPT | Mod: PBBFAC,,, | Performed by: ORTHOPAEDIC SURGERY

## 2021-10-04 PROCEDURE — 1160F PR REVIEW ALL MEDS BY PRESCRIBER/CLIN PHARMACIST DOCUMENTED: ICD-10-PCS | Mod: CPTII,S$GLB,, | Performed by: ORTHOPAEDIC SURGERY

## 2021-10-04 PROCEDURE — 99204 PR OFFICE/OUTPT VISIT, NEW, LEVL IV, 45-59 MIN: ICD-10-PCS | Mod: 25,S$GLB,, | Performed by: ORTHOPAEDIC SURGERY

## 2021-10-04 PROCEDURE — 3080F DIAST BP >= 90 MM HG: CPT | Mod: CPTII,S$GLB,, | Performed by: ORTHOPAEDIC SURGERY

## 2021-10-04 PROCEDURE — 3044F PR MOST RECENT HEMOGLOBIN A1C LEVEL <7.0%: ICD-10-PCS | Mod: CPTII,S$GLB,, | Performed by: ORTHOPAEDIC SURGERY

## 2021-10-04 PROCEDURE — 1159F MED LIST DOCD IN RCRD: CPT | Mod: CPTII,S$GLB,, | Performed by: ORTHOPAEDIC SURGERY

## 2021-10-04 RX ORDER — MELOXICAM 15 MG/1
15 TABLET ORAL DAILY
Qty: 30 TABLET | Refills: 2 | Status: SHIPPED | OUTPATIENT
Start: 2021-10-04 | End: 2022-04-06 | Stop reason: SDUPTHER

## 2021-10-04 RX ORDER — CELECOXIB 200 MG/1
200 CAPSULE ORAL 2 TIMES DAILY
Qty: 60 CAPSULE | Refills: 3 | Status: CANCELLED | OUTPATIENT
Start: 2021-10-04 | End: 2022-02-01

## 2021-10-06 ENCOUNTER — CLINICAL SUPPORT (OUTPATIENT)
Dept: REHABILITATION | Facility: HOSPITAL | Age: 76
End: 2021-10-06
Attending: ORTHOPAEDIC SURGERY
Payer: MEDICARE

## 2021-10-06 DIAGNOSIS — Z78.9 IMPAIRED MOBILITY AND ADLS: ICD-10-CM

## 2021-10-06 DIAGNOSIS — R29.898 WEAKNESS OF RIGHT HIP: ICD-10-CM

## 2021-10-06 DIAGNOSIS — M25.661 DECREASED RANGE OF MOTION (ROM) OF RIGHT KNEE: ICD-10-CM

## 2021-10-06 DIAGNOSIS — M62.81 QUADRICEPS WEAKNESS: ICD-10-CM

## 2021-10-06 DIAGNOSIS — Z74.09 IMPAIRED MOBILITY AND ADLS: ICD-10-CM

## 2021-10-06 PROCEDURE — 97162 PT EVAL MOD COMPLEX 30 MIN: CPT | Mod: PN

## 2021-10-06 PROCEDURE — 97110 THERAPEUTIC EXERCISES: CPT | Mod: PN

## 2021-10-15 ENCOUNTER — CLINICAL SUPPORT (OUTPATIENT)
Dept: REHABILITATION | Facility: HOSPITAL | Age: 76
End: 2021-10-15
Attending: ORTHOPAEDIC SURGERY
Payer: MEDICARE

## 2021-10-15 DIAGNOSIS — M25.661 DECREASED RANGE OF MOTION (ROM) OF RIGHT KNEE: ICD-10-CM

## 2021-10-15 DIAGNOSIS — Z74.09 IMPAIRED MOBILITY AND ADLS: ICD-10-CM

## 2021-10-15 DIAGNOSIS — R29.898 WEAKNESS OF RIGHT HIP: ICD-10-CM

## 2021-10-15 DIAGNOSIS — M62.81 QUADRICEPS WEAKNESS: ICD-10-CM

## 2021-10-15 DIAGNOSIS — Z78.9 IMPAIRED MOBILITY AND ADLS: ICD-10-CM

## 2021-10-15 PROCEDURE — 97110 THERAPEUTIC EXERCISES: CPT | Mod: PN

## 2021-10-22 ENCOUNTER — CLINICAL SUPPORT (OUTPATIENT)
Dept: REHABILITATION | Facility: HOSPITAL | Age: 76
End: 2021-10-22
Attending: ORTHOPAEDIC SURGERY
Payer: MEDICARE

## 2021-10-22 DIAGNOSIS — R29.898 WEAKNESS OF RIGHT HIP: ICD-10-CM

## 2021-10-22 DIAGNOSIS — Z78.9 IMPAIRED MOBILITY AND ADLS: ICD-10-CM

## 2021-10-22 DIAGNOSIS — M62.81 QUADRICEPS WEAKNESS: ICD-10-CM

## 2021-10-22 DIAGNOSIS — M25.661 DECREASED RANGE OF MOTION (ROM) OF RIGHT KNEE: ICD-10-CM

## 2021-10-22 DIAGNOSIS — Z74.09 IMPAIRED MOBILITY AND ADLS: ICD-10-CM

## 2021-10-22 PROCEDURE — 97110 THERAPEUTIC EXERCISES: CPT | Mod: PN

## 2021-10-29 ENCOUNTER — CLINICAL SUPPORT (OUTPATIENT)
Dept: REHABILITATION | Facility: HOSPITAL | Age: 76
End: 2021-10-29
Attending: ORTHOPAEDIC SURGERY
Payer: MEDICARE

## 2021-10-29 DIAGNOSIS — R29.898 WEAKNESS OF RIGHT HIP: ICD-10-CM

## 2021-10-29 DIAGNOSIS — M25.661 DECREASED RANGE OF MOTION (ROM) OF RIGHT KNEE: ICD-10-CM

## 2021-10-29 DIAGNOSIS — M62.81 QUADRICEPS WEAKNESS: ICD-10-CM

## 2021-10-29 DIAGNOSIS — Z78.9 IMPAIRED MOBILITY AND ADLS: ICD-10-CM

## 2021-10-29 DIAGNOSIS — Z74.09 IMPAIRED MOBILITY AND ADLS: ICD-10-CM

## 2021-10-29 PROCEDURE — 97110 THERAPEUTIC EXERCISES: CPT | Mod: PN

## 2021-11-05 ENCOUNTER — CLINICAL SUPPORT (OUTPATIENT)
Dept: REHABILITATION | Facility: HOSPITAL | Age: 76
End: 2021-11-05
Attending: ORTHOPAEDIC SURGERY
Payer: MEDICARE

## 2021-11-05 DIAGNOSIS — R29.898 WEAKNESS OF RIGHT HIP: ICD-10-CM

## 2021-11-05 DIAGNOSIS — M25.661 DECREASED RANGE OF MOTION (ROM) OF RIGHT KNEE: ICD-10-CM

## 2021-11-05 DIAGNOSIS — Z78.9 IMPAIRED MOBILITY AND ADLS: ICD-10-CM

## 2021-11-05 DIAGNOSIS — Z74.09 IMPAIRED MOBILITY AND ADLS: ICD-10-CM

## 2021-11-05 DIAGNOSIS — M62.81 QUADRICEPS WEAKNESS: ICD-10-CM

## 2021-11-05 PROCEDURE — 97110 THERAPEUTIC EXERCISES: CPT | Mod: KX,PN,CQ

## 2021-11-08 ENCOUNTER — OFFICE VISIT (OUTPATIENT)
Dept: CARDIOLOGY | Facility: CLINIC | Age: 76
End: 2021-11-08
Payer: MEDICARE

## 2021-11-08 VITALS
DIASTOLIC BLOOD PRESSURE: 92 MMHG | BODY MASS INDEX: 32.47 KG/M2 | SYSTOLIC BLOOD PRESSURE: 150 MMHG | WEIGHT: 172 LBS | HEIGHT: 61 IN | HEART RATE: 71 BPM | OXYGEN SATURATION: 97 %

## 2021-11-08 DIAGNOSIS — I27.20 PULMONARY HTN: ICD-10-CM

## 2021-11-08 DIAGNOSIS — R07.2 PRECORDIAL PAIN: ICD-10-CM

## 2021-11-08 DIAGNOSIS — Z74.09 IMPAIRED MOBILITY AND ADLS: ICD-10-CM

## 2021-11-08 DIAGNOSIS — I50.32 CHRONIC DIASTOLIC HEART FAILURE: ICD-10-CM

## 2021-11-08 DIAGNOSIS — M25.661 DECREASED RANGE OF MOTION (ROM) OF RIGHT KNEE: ICD-10-CM

## 2021-11-08 DIAGNOSIS — M46.90 INFLAMMATORY SPONDYLOPATHY, UNSPECIFIED SPINAL REGION: ICD-10-CM

## 2021-11-08 DIAGNOSIS — G47.33 OSA (OBSTRUCTIVE SLEEP APNEA): ICD-10-CM

## 2021-11-08 DIAGNOSIS — R06.09 DOE (DYSPNEA ON EXERTION): ICD-10-CM

## 2021-11-08 DIAGNOSIS — I10 HYPERTENSION, UNSPECIFIED TYPE: Primary | ICD-10-CM

## 2021-11-08 DIAGNOSIS — R74.8 ELEVATED CPK: ICD-10-CM

## 2021-11-08 DIAGNOSIS — I34.0 NONRHEUMATIC MITRAL VALVE REGURGITATION: ICD-10-CM

## 2021-11-08 DIAGNOSIS — Z78.9 IMPAIRED MOBILITY AND ADLS: ICD-10-CM

## 2021-11-08 PROCEDURE — 99999 PR PBB SHADOW E&M-EST. PATIENT-LVL IV: ICD-10-PCS | Mod: PBBFAC,,, | Performed by: INTERNAL MEDICINE

## 2021-11-08 PROCEDURE — 3077F SYST BP >= 140 MM HG: CPT | Mod: CPTII,S$GLB,, | Performed by: INTERNAL MEDICINE

## 2021-11-08 PROCEDURE — 3008F PR BODY MASS INDEX (BMI) DOCUMENTED: ICD-10-PCS | Mod: CPTII,S$GLB,, | Performed by: INTERNAL MEDICINE

## 2021-11-08 PROCEDURE — 3077F PR MOST RECENT SYSTOLIC BLOOD PRESSURE >= 140 MM HG: ICD-10-PCS | Mod: CPTII,S$GLB,, | Performed by: INTERNAL MEDICINE

## 2021-11-08 PROCEDURE — 99214 OFFICE O/P EST MOD 30 MIN: CPT | Mod: S$GLB,,, | Performed by: INTERNAL MEDICINE

## 2021-11-08 PROCEDURE — 1100F PR PT FALLS ASSESS DOC 2+ FALLS/FALL W/INJURY/YR: ICD-10-PCS | Mod: CPTII,S$GLB,, | Performed by: INTERNAL MEDICINE

## 2021-11-08 PROCEDURE — 3044F HG A1C LEVEL LT 7.0%: CPT | Mod: CPTII,S$GLB,, | Performed by: INTERNAL MEDICINE

## 2021-11-08 PROCEDURE — 1160F PR REVIEW ALL MEDS BY PRESCRIBER/CLIN PHARMACIST DOCUMENTED: ICD-10-PCS | Mod: CPTII,S$GLB,, | Performed by: INTERNAL MEDICINE

## 2021-11-08 PROCEDURE — 3080F DIAST BP >= 90 MM HG: CPT | Mod: CPTII,S$GLB,, | Performed by: INTERNAL MEDICINE

## 2021-11-08 PROCEDURE — 3080F PR MOST RECENT DIASTOLIC BLOOD PRESSURE >= 90 MM HG: ICD-10-PCS | Mod: CPTII,S$GLB,, | Performed by: INTERNAL MEDICINE

## 2021-11-08 PROCEDURE — 1126F PR PAIN SEVERITY QUANTIFIED, NO PAIN PRESENT: ICD-10-PCS | Mod: CPTII,S$GLB,, | Performed by: INTERNAL MEDICINE

## 2021-11-08 PROCEDURE — 99999 PR PBB SHADOW E&M-EST. PATIENT-LVL IV: CPT | Mod: PBBFAC,,, | Performed by: INTERNAL MEDICINE

## 2021-11-08 PROCEDURE — 1159F MED LIST DOCD IN RCRD: CPT | Mod: CPTII,S$GLB,, | Performed by: INTERNAL MEDICINE

## 2021-11-08 PROCEDURE — 99214 PR OFFICE/OUTPT VISIT, EST, LEVL IV, 30-39 MIN: ICD-10-PCS | Mod: S$GLB,,, | Performed by: INTERNAL MEDICINE

## 2021-11-08 PROCEDURE — 4010F PR ACE/ARB THEARPY RXD/TAKEN: ICD-10-PCS | Mod: CPTII,S$GLB,, | Performed by: INTERNAL MEDICINE

## 2021-11-08 PROCEDURE — 1126F AMNT PAIN NOTED NONE PRSNT: CPT | Mod: CPTII,S$GLB,, | Performed by: INTERNAL MEDICINE

## 2021-11-08 PROCEDURE — 1160F RVW MEDS BY RX/DR IN RCRD: CPT | Mod: CPTII,S$GLB,, | Performed by: INTERNAL MEDICINE

## 2021-11-08 PROCEDURE — 3288F PR FALLS RISK ASSESSMENT DOCUMENTED: ICD-10-PCS | Mod: CPTII,S$GLB,, | Performed by: INTERNAL MEDICINE

## 2021-11-08 PROCEDURE — 93000 ELECTROCARDIOGRAM COMPLETE: CPT | Mod: S$GLB,,, | Performed by: INTERNAL MEDICINE

## 2021-11-08 PROCEDURE — 99499 RISK ADDL DX/OHS AUDIT: ICD-10-PCS | Mod: S$GLB,,, | Performed by: INTERNAL MEDICINE

## 2021-11-08 PROCEDURE — 1159F PR MEDICATION LIST DOCUMENTED IN MEDICAL RECORD: ICD-10-PCS | Mod: CPTII,S$GLB,, | Performed by: INTERNAL MEDICINE

## 2021-11-08 PROCEDURE — 99499 UNLISTED E&M SERVICE: CPT | Mod: S$GLB,,, | Performed by: INTERNAL MEDICINE

## 2021-11-08 PROCEDURE — 93000 EKG 12-LEAD: ICD-10-PCS | Mod: S$GLB,,, | Performed by: INTERNAL MEDICINE

## 2021-11-08 PROCEDURE — 3044F PR MOST RECENT HEMOGLOBIN A1C LEVEL <7.0%: ICD-10-PCS | Mod: CPTII,S$GLB,, | Performed by: INTERNAL MEDICINE

## 2021-11-08 PROCEDURE — 1100F PTFALLS ASSESS-DOCD GE2>/YR: CPT | Mod: CPTII,S$GLB,, | Performed by: INTERNAL MEDICINE

## 2021-11-08 PROCEDURE — 3008F BODY MASS INDEX DOCD: CPT | Mod: CPTII,S$GLB,, | Performed by: INTERNAL MEDICINE

## 2021-11-08 PROCEDURE — 4010F ACE/ARB THERAPY RXD/TAKEN: CPT | Mod: CPTII,S$GLB,, | Performed by: INTERNAL MEDICINE

## 2021-11-08 PROCEDURE — 3288F FALL RISK ASSESSMENT DOCD: CPT | Mod: CPTII,S$GLB,, | Performed by: INTERNAL MEDICINE

## 2021-11-08 RX ORDER — AMLODIPINE BESYLATE 10 MG/1
10 TABLET ORAL DAILY
Qty: 90 TABLET | Refills: 3 | Status: SHIPPED | OUTPATIENT
Start: 2021-11-08 | End: 2023-02-19 | Stop reason: SDUPTHER

## 2021-11-11 ENCOUNTER — PATIENT OUTREACH (OUTPATIENT)
Dept: ADMINISTRATIVE | Facility: OTHER | Age: 76
End: 2021-11-11
Payer: MEDICARE

## 2021-11-11 DIAGNOSIS — Z12.31 ENCOUNTER FOR SCREENING MAMMOGRAM FOR MALIGNANT NEOPLASM OF BREAST: Primary | ICD-10-CM

## 2021-11-12 ENCOUNTER — CLINICAL SUPPORT (OUTPATIENT)
Dept: REHABILITATION | Facility: HOSPITAL | Age: 76
End: 2021-11-12
Payer: MEDICARE

## 2021-11-12 DIAGNOSIS — M62.81 QUADRICEPS WEAKNESS: ICD-10-CM

## 2021-11-12 DIAGNOSIS — M25.661 DECREASED RANGE OF MOTION (ROM) OF RIGHT KNEE: ICD-10-CM

## 2021-11-12 DIAGNOSIS — Z78.9 IMPAIRED MOBILITY AND ADLS: ICD-10-CM

## 2021-11-12 DIAGNOSIS — Z74.09 IMPAIRED MOBILITY AND ADLS: ICD-10-CM

## 2021-11-12 DIAGNOSIS — R29.898 WEAKNESS OF RIGHT HIP: ICD-10-CM

## 2021-11-12 PROCEDURE — 97110 THERAPEUTIC EXERCISES: CPT | Mod: KX,PN

## 2021-11-15 ENCOUNTER — OFFICE VISIT (OUTPATIENT)
Dept: SPORTS MEDICINE | Facility: CLINIC | Age: 76
End: 2021-11-15
Payer: MEDICARE

## 2021-11-15 ENCOUNTER — PATIENT MESSAGE (OUTPATIENT)
Dept: FAMILY MEDICINE | Facility: CLINIC | Age: 76
End: 2021-11-15
Payer: MEDICARE

## 2021-11-15 VITALS
WEIGHT: 172 LBS | SYSTOLIC BLOOD PRESSURE: 156 MMHG | DIASTOLIC BLOOD PRESSURE: 79 MMHG | HEART RATE: 64 BPM | HEIGHT: 61 IN | BODY MASS INDEX: 32.47 KG/M2

## 2021-11-15 DIAGNOSIS — S83.8X1A ACUTE MEDIAL MENISCAL INJURY OF RIGHT KNEE, INITIAL ENCOUNTER: ICD-10-CM

## 2021-11-15 DIAGNOSIS — S83.241A ACUTE MEDIAL MENISCAL TEAR, RIGHT, INITIAL ENCOUNTER: Primary | ICD-10-CM

## 2021-11-15 PROCEDURE — 3288F PR FALLS RISK ASSESSMENT DOCUMENTED: ICD-10-PCS | Mod: CPTII,S$GLB,, | Performed by: ORTHOPAEDIC SURGERY

## 2021-11-15 PROCEDURE — 99999 PR PBB SHADOW E&M-EST. PATIENT-LVL III: CPT | Mod: PBBFAC,,, | Performed by: ORTHOPAEDIC SURGERY

## 2021-11-15 PROCEDURE — 4010F ACE/ARB THERAPY RXD/TAKEN: CPT | Mod: CPTII,S$GLB,, | Performed by: ORTHOPAEDIC SURGERY

## 2021-11-15 PROCEDURE — 1125F PR PAIN SEVERITY QUANTIFIED, PAIN PRESENT: ICD-10-PCS | Mod: CPTII,S$GLB,, | Performed by: ORTHOPAEDIC SURGERY

## 2021-11-15 PROCEDURE — 3077F SYST BP >= 140 MM HG: CPT | Mod: CPTII,S$GLB,, | Performed by: ORTHOPAEDIC SURGERY

## 2021-11-15 PROCEDURE — 3008F PR BODY MASS INDEX (BMI) DOCUMENTED: ICD-10-PCS | Mod: CPTII,S$GLB,, | Performed by: ORTHOPAEDIC SURGERY

## 2021-11-15 PROCEDURE — 4010F PR ACE/ARB THEARPY RXD/TAKEN: ICD-10-PCS | Mod: CPTII,S$GLB,, | Performed by: ORTHOPAEDIC SURGERY

## 2021-11-15 PROCEDURE — 99214 PR OFFICE/OUTPT VISIT, EST, LEVL IV, 30-39 MIN: ICD-10-PCS | Mod: S$GLB,,, | Performed by: ORTHOPAEDIC SURGERY

## 2021-11-15 PROCEDURE — 99999 PR PBB SHADOW E&M-EST. PATIENT-LVL III: ICD-10-PCS | Mod: PBBFAC,,, | Performed by: ORTHOPAEDIC SURGERY

## 2021-11-15 PROCEDURE — 1125F AMNT PAIN NOTED PAIN PRSNT: CPT | Mod: CPTII,S$GLB,, | Performed by: ORTHOPAEDIC SURGERY

## 2021-11-15 PROCEDURE — 3078F PR MOST RECENT DIASTOLIC BLOOD PRESSURE < 80 MM HG: ICD-10-PCS | Mod: CPTII,S$GLB,, | Performed by: ORTHOPAEDIC SURGERY

## 2021-11-15 PROCEDURE — 3008F BODY MASS INDEX DOCD: CPT | Mod: CPTII,S$GLB,, | Performed by: ORTHOPAEDIC SURGERY

## 2021-11-15 PROCEDURE — 3288F FALL RISK ASSESSMENT DOCD: CPT | Mod: CPTII,S$GLB,, | Performed by: ORTHOPAEDIC SURGERY

## 2021-11-15 PROCEDURE — 1101F PT FALLS ASSESS-DOCD LE1/YR: CPT | Mod: CPTII,S$GLB,, | Performed by: ORTHOPAEDIC SURGERY

## 2021-11-15 PROCEDURE — 3044F HG A1C LEVEL LT 7.0%: CPT | Mod: CPTII,S$GLB,, | Performed by: ORTHOPAEDIC SURGERY

## 2021-11-15 PROCEDURE — 3044F PR MOST RECENT HEMOGLOBIN A1C LEVEL <7.0%: ICD-10-PCS | Mod: CPTII,S$GLB,, | Performed by: ORTHOPAEDIC SURGERY

## 2021-11-15 PROCEDURE — 1101F PR PT FALLS ASSESS DOC 0-1 FALLS W/OUT INJ PAST YR: ICD-10-PCS | Mod: CPTII,S$GLB,, | Performed by: ORTHOPAEDIC SURGERY

## 2021-11-15 PROCEDURE — 3078F DIAST BP <80 MM HG: CPT | Mod: CPTII,S$GLB,, | Performed by: ORTHOPAEDIC SURGERY

## 2021-11-15 PROCEDURE — 99214 OFFICE O/P EST MOD 30 MIN: CPT | Mod: S$GLB,,, | Performed by: ORTHOPAEDIC SURGERY

## 2021-11-15 PROCEDURE — 3077F PR MOST RECENT SYSTOLIC BLOOD PRESSURE >= 140 MM HG: ICD-10-PCS | Mod: CPTII,S$GLB,, | Performed by: ORTHOPAEDIC SURGERY

## 2021-11-19 ENCOUNTER — CLINICAL SUPPORT (OUTPATIENT)
Dept: REHABILITATION | Facility: HOSPITAL | Age: 76
End: 2021-11-19
Payer: MEDICARE

## 2021-11-19 DIAGNOSIS — Z78.9 IMPAIRED MOBILITY AND ADLS: ICD-10-CM

## 2021-11-19 DIAGNOSIS — M23.91 INTERNAL DERANGEMENT OF RIGHT KNEE: ICD-10-CM

## 2021-11-19 DIAGNOSIS — R29.898 WEAKNESS OF RIGHT HIP: ICD-10-CM

## 2021-11-19 DIAGNOSIS — M22.41 CHONDROMALACIA OF RIGHT PATELLA: ICD-10-CM

## 2021-11-19 DIAGNOSIS — M25.661 DECREASED RANGE OF MOTION (ROM) OF RIGHT KNEE: ICD-10-CM

## 2021-11-19 DIAGNOSIS — Z74.09 IMPAIRED MOBILITY AND ADLS: ICD-10-CM

## 2021-11-19 DIAGNOSIS — M94.261 CHONDROMALACIA OF RIGHT KNEE: ICD-10-CM

## 2021-11-19 DIAGNOSIS — S83.241D ACUTE MEDIAL MENISCUS TEAR OF RIGHT KNEE, SUBSEQUENT ENCOUNTER: Primary | ICD-10-CM

## 2021-11-19 DIAGNOSIS — M62.81 QUADRICEPS WEAKNESS: ICD-10-CM

## 2021-11-19 PROCEDURE — 97110 THERAPEUTIC EXERCISES: CPT | Mod: KX,PN

## 2021-11-22 ENCOUNTER — OFFICE VISIT (OUTPATIENT)
Dept: FAMILY MEDICINE | Facility: CLINIC | Age: 76
End: 2021-11-22
Payer: MEDICARE

## 2021-11-22 ENCOUNTER — LAB VISIT (OUTPATIENT)
Dept: LAB | Facility: HOSPITAL | Age: 76
End: 2021-11-22
Attending: PHYSICIAN ASSISTANT
Payer: MEDICARE

## 2021-11-22 VITALS
SYSTOLIC BLOOD PRESSURE: 118 MMHG | BODY MASS INDEX: 31.34 KG/M2 | HEART RATE: 78 BPM | HEIGHT: 61 IN | OXYGEN SATURATION: 99 % | WEIGHT: 166 LBS | DIASTOLIC BLOOD PRESSURE: 80 MMHG | TEMPERATURE: 99 F | RESPIRATION RATE: 16 BRPM

## 2021-11-22 DIAGNOSIS — R73.03 PREDIABETES: ICD-10-CM

## 2021-11-22 DIAGNOSIS — I10 PRIMARY HYPERTENSION: ICD-10-CM

## 2021-11-22 DIAGNOSIS — Z01.818 PRE-OP EXAM: ICD-10-CM

## 2021-11-22 DIAGNOSIS — Z23 FLU VACCINE NEED: ICD-10-CM

## 2021-11-22 DIAGNOSIS — Z01.818 PRE-OP EXAM: Primary | ICD-10-CM

## 2021-11-22 LAB
ALBUMIN SERPL BCP-MCNC: 3.6 G/DL (ref 3.5–5.2)
ALP SERPL-CCNC: 59 U/L (ref 55–135)
ALT SERPL W/O P-5'-P-CCNC: 19 U/L (ref 10–44)
ANION GAP SERPL CALC-SCNC: 13 MMOL/L (ref 8–16)
AST SERPL-CCNC: 29 U/L (ref 10–40)
BASOPHILS # BLD AUTO: 0.06 K/UL (ref 0–0.2)
BASOPHILS NFR BLD: 0.8 % (ref 0–1.9)
BILIRUB SERPL-MCNC: 0.3 MG/DL (ref 0.1–1)
BUN SERPL-MCNC: 17 MG/DL (ref 8–23)
CALCIUM SERPL-MCNC: 9.4 MG/DL (ref 8.7–10.5)
CHLORIDE SERPL-SCNC: 103 MMOL/L (ref 95–110)
CHOLEST SERPL-MCNC: 165 MG/DL (ref 120–199)
CHOLEST/HDLC SERPL: 3.8 {RATIO} (ref 2–5)
CO2 SERPL-SCNC: 24 MMOL/L (ref 23–29)
CREAT SERPL-MCNC: 0.9 MG/DL (ref 0.5–1.4)
DIFFERENTIAL METHOD: ABNORMAL
EOSINOPHIL # BLD AUTO: 0.2 K/UL (ref 0–0.5)
EOSINOPHIL NFR BLD: 3.4 % (ref 0–8)
ERYTHROCYTE [DISTWIDTH] IN BLOOD BY AUTOMATED COUNT: 14.6 % (ref 11.5–14.5)
EST. GFR  (AFRICAN AMERICAN): >60 ML/MIN/1.73 M^2
EST. GFR  (NON AFRICAN AMERICAN): >60 ML/MIN/1.73 M^2
ESTIMATED AVG GLUCOSE: 108 MG/DL (ref 68–131)
GLUCOSE SERPL-MCNC: 98 MG/DL (ref 70–110)
HBA1C MFR BLD: 5.4 % (ref 4–5.6)
HCT VFR BLD AUTO: 42.4 % (ref 37–48.5)
HDLC SERPL-MCNC: 43 MG/DL (ref 40–75)
HDLC SERPL: 26.1 % (ref 20–50)
HGB BLD-MCNC: 13.2 G/DL (ref 12–16)
IMM GRANULOCYTES # BLD AUTO: 0.01 K/UL (ref 0–0.04)
IMM GRANULOCYTES NFR BLD AUTO: 0.1 % (ref 0–0.5)
LDLC SERPL CALC-MCNC: 94.4 MG/DL (ref 63–159)
LYMPHOCYTES # BLD AUTO: 2.4 K/UL (ref 1–4.8)
LYMPHOCYTES NFR BLD: 33.9 % (ref 18–48)
MCH RBC QN AUTO: 27.3 PG (ref 27–31)
MCHC RBC AUTO-ENTMCNC: 31.1 G/DL (ref 32–36)
MCV RBC AUTO: 88 FL (ref 82–98)
MONOCYTES # BLD AUTO: 1 K/UL (ref 0.3–1)
MONOCYTES NFR BLD: 14.4 % (ref 4–15)
NEUTROPHILS # BLD AUTO: 3.4 K/UL (ref 1.8–7.7)
NEUTROPHILS NFR BLD: 47.4 % (ref 38–73)
NONHDLC SERPL-MCNC: 122 MG/DL
NRBC BLD-RTO: 0 /100 WBC
PLATELET # BLD AUTO: 386 K/UL (ref 150–450)
PMV BLD AUTO: 11.2 FL (ref 9.2–12.9)
POTASSIUM SERPL-SCNC: 3.9 MMOL/L (ref 3.5–5.1)
PROT SERPL-MCNC: 8 G/DL (ref 6–8.4)
RBC # BLD AUTO: 4.84 M/UL (ref 4–5.4)
SODIUM SERPL-SCNC: 140 MMOL/L (ref 136–145)
TRIGL SERPL-MCNC: 138 MG/DL (ref 30–150)
WBC # BLD AUTO: 7.14 K/UL (ref 3.9–12.7)

## 2021-11-22 PROCEDURE — 99499 UNLISTED E&M SERVICE: CPT | Mod: S$GLB,,, | Performed by: PHYSICIAN ASSISTANT

## 2021-11-22 PROCEDURE — 99499 RISK ADDL DX/OHS AUDIT: ICD-10-PCS | Mod: S$GLB,,, | Performed by: PHYSICIAN ASSISTANT

## 2021-11-22 PROCEDURE — 85025 COMPLETE CBC W/AUTO DIFF WBC: CPT | Performed by: PHYSICIAN ASSISTANT

## 2021-11-22 PROCEDURE — 4010F PR ACE/ARB THEARPY RXD/TAKEN: ICD-10-PCS | Mod: CPTII,S$GLB,, | Performed by: PHYSICIAN ASSISTANT

## 2021-11-22 PROCEDURE — 99213 OFFICE O/P EST LOW 20 MIN: CPT | Mod: S$GLB,,, | Performed by: PHYSICIAN ASSISTANT

## 2021-11-22 PROCEDURE — 90694 VACC AIIV4 NO PRSRV 0.5ML IM: CPT | Mod: S$GLB,,, | Performed by: PHYSICIAN ASSISTANT

## 2021-11-22 PROCEDURE — 83036 HEMOGLOBIN GLYCOSYLATED A1C: CPT | Performed by: PHYSICIAN ASSISTANT

## 2021-11-22 PROCEDURE — 80053 COMPREHEN METABOLIC PANEL: CPT | Performed by: PHYSICIAN ASSISTANT

## 2021-11-22 PROCEDURE — 99213 PR OFFICE/OUTPT VISIT, EST, LEVL III, 20-29 MIN: ICD-10-PCS | Mod: S$GLB,,, | Performed by: PHYSICIAN ASSISTANT

## 2021-11-22 PROCEDURE — G0008 FLU VACCINE - QUADRIVALENT - ADJUVANTED: ICD-10-PCS | Mod: S$GLB,,, | Performed by: PHYSICIAN ASSISTANT

## 2021-11-22 PROCEDURE — 99999 PR PBB SHADOW E&M-EST. PATIENT-LVL IV: CPT | Mod: PBBFAC,,, | Performed by: PHYSICIAN ASSISTANT

## 2021-11-22 PROCEDURE — 99999 PR PBB SHADOW E&M-EST. PATIENT-LVL IV: ICD-10-PCS | Mod: PBBFAC,,, | Performed by: PHYSICIAN ASSISTANT

## 2021-11-22 PROCEDURE — 4010F ACE/ARB THERAPY RXD/TAKEN: CPT | Mod: CPTII,S$GLB,, | Performed by: PHYSICIAN ASSISTANT

## 2021-11-22 PROCEDURE — 36415 COLL VENOUS BLD VENIPUNCTURE: CPT | Mod: PO | Performed by: PHYSICIAN ASSISTANT

## 2021-11-22 PROCEDURE — 80061 LIPID PANEL: CPT | Performed by: PHYSICIAN ASSISTANT

## 2021-11-22 PROCEDURE — 90694 FLU VACCINE - QUADRIVALENT - ADJUVANTED: ICD-10-PCS | Mod: S$GLB,,, | Performed by: PHYSICIAN ASSISTANT

## 2021-11-22 PROCEDURE — G0008 ADMIN INFLUENZA VIRUS VAC: HCPCS | Mod: S$GLB,,, | Performed by: PHYSICIAN ASSISTANT

## 2021-11-22 RX ORDER — HYDRALAZINE HYDROCHLORIDE 100 MG/1
100 TABLET, FILM COATED ORAL 3 TIMES DAILY
Qty: 270 TABLET | Refills: 3 | Status: SHIPPED | OUTPATIENT
Start: 2021-11-22 | End: 2022-04-06 | Stop reason: SDUPTHER

## 2021-11-23 ENCOUNTER — HOSPITAL ENCOUNTER (OUTPATIENT)
Dept: RADIOLOGY | Facility: HOSPITAL | Age: 76
Discharge: HOME | End: 2021-11-23
Attending: FAMILY MEDICINE
Payer: MEDICARE

## 2021-11-23 VITALS — HEIGHT: 61 IN | WEIGHT: 166 LBS | BODY MASS INDEX: 31.34 KG/M2

## 2021-11-23 DIAGNOSIS — Z12.31 ENCOUNTER FOR SCREENING MAMMOGRAM FOR MALIGNANT NEOPLASM OF BREAST: ICD-10-CM

## 2021-11-23 PROCEDURE — 77067 SCR MAMMO BI INCL CAD: CPT | Mod: 26,,, | Performed by: RADIOLOGY

## 2021-11-23 PROCEDURE — 77063 MAMMO DIGITAL SCREENING BILAT WITH TOMO: ICD-10-PCS | Mod: 26,,, | Performed by: RADIOLOGY

## 2021-11-23 PROCEDURE — 77067 SCR MAMMO BI INCL CAD: CPT | Mod: TC

## 2021-11-23 PROCEDURE — 77067 MAMMO DIGITAL SCREENING BILAT WITH TOMO: ICD-10-PCS | Mod: 26,,, | Performed by: RADIOLOGY

## 2021-11-23 PROCEDURE — 77063 BREAST TOMOSYNTHESIS BI: CPT | Mod: 26,,, | Performed by: RADIOLOGY

## 2021-12-06 ENCOUNTER — OFFICE VISIT (OUTPATIENT)
Dept: CARDIOLOGY | Facility: CLINIC | Age: 76
End: 2021-12-06
Payer: MEDICARE

## 2021-12-06 VITALS
DIASTOLIC BLOOD PRESSURE: 68 MMHG | SYSTOLIC BLOOD PRESSURE: 130 MMHG | HEIGHT: 61 IN | BODY MASS INDEX: 31.34 KG/M2 | HEART RATE: 88 BPM | OXYGEN SATURATION: 98 % | WEIGHT: 166 LBS

## 2021-12-06 DIAGNOSIS — I50.32 CHRONIC DIASTOLIC HEART FAILURE: ICD-10-CM

## 2021-12-06 DIAGNOSIS — Z74.09 IMPAIRED MOBILITY AND ADLS: ICD-10-CM

## 2021-12-06 DIAGNOSIS — Z78.9 IMPAIRED MOBILITY AND ADLS: ICD-10-CM

## 2021-12-06 DIAGNOSIS — G47.8 SLEEP AROUSAL DISORDER: ICD-10-CM

## 2021-12-06 DIAGNOSIS — R07.2 PRECORDIAL PAIN: ICD-10-CM

## 2021-12-06 DIAGNOSIS — R06.09 DOE (DYSPNEA ON EXERTION): ICD-10-CM

## 2021-12-06 DIAGNOSIS — I34.0 NONRHEUMATIC MITRAL VALVE REGURGITATION: ICD-10-CM

## 2021-12-06 DIAGNOSIS — G47.33 OSA (OBSTRUCTIVE SLEEP APNEA): ICD-10-CM

## 2021-12-06 DIAGNOSIS — I10 PRIMARY HYPERTENSION: Primary | ICD-10-CM

## 2021-12-06 DIAGNOSIS — I27.20 PULMONARY HTN: ICD-10-CM

## 2021-12-06 DIAGNOSIS — S83.8X1A ACUTE MEDIAL MENISCAL INJURY OF RIGHT KNEE, INITIAL ENCOUNTER: ICD-10-CM

## 2021-12-06 DIAGNOSIS — R74.8 ELEVATED CPK: ICD-10-CM

## 2021-12-06 PROCEDURE — 4010F PR ACE/ARB THEARPY RXD/TAKEN: ICD-10-PCS | Mod: CPTII,S$GLB,, | Performed by: INTERNAL MEDICINE

## 2021-12-06 PROCEDURE — 99499 RISK ADDL DX/OHS AUDIT: ICD-10-PCS | Mod: S$GLB,,, | Performed by: INTERNAL MEDICINE

## 2021-12-06 PROCEDURE — 99214 PR OFFICE/OUTPT VISIT, EST, LEVL IV, 30-39 MIN: ICD-10-PCS | Mod: S$GLB,,, | Performed by: INTERNAL MEDICINE

## 2021-12-06 PROCEDURE — 99499 UNLISTED E&M SERVICE: CPT | Mod: S$GLB,,, | Performed by: INTERNAL MEDICINE

## 2021-12-06 PROCEDURE — 99999 PR PBB SHADOW E&M-EST. PATIENT-LVL IV: CPT | Mod: PBBFAC,,, | Performed by: INTERNAL MEDICINE

## 2021-12-06 PROCEDURE — 99214 OFFICE O/P EST MOD 30 MIN: CPT | Mod: S$GLB,,, | Performed by: INTERNAL MEDICINE

## 2021-12-06 PROCEDURE — 4010F ACE/ARB THERAPY RXD/TAKEN: CPT | Mod: CPTII,S$GLB,, | Performed by: INTERNAL MEDICINE

## 2021-12-06 PROCEDURE — 99999 PR PBB SHADOW E&M-EST. PATIENT-LVL IV: ICD-10-PCS | Mod: PBBFAC,,, | Performed by: INTERNAL MEDICINE

## 2021-12-07 ENCOUNTER — PATIENT MESSAGE (OUTPATIENT)
Dept: SURGERY | Facility: HOSPITAL | Age: 76
End: 2021-12-07
Payer: MEDICARE

## 2021-12-10 ENCOUNTER — PATIENT MESSAGE (OUTPATIENT)
Dept: FAMILY MEDICINE | Facility: CLINIC | Age: 76
End: 2021-12-10
Payer: MEDICARE

## 2021-12-10 ENCOUNTER — OFFICE VISIT (OUTPATIENT)
Dept: SPORTS MEDICINE | Facility: CLINIC | Age: 76
End: 2021-12-10
Payer: MEDICARE

## 2021-12-10 VITALS
BODY MASS INDEX: 31.91 KG/M2 | WEIGHT: 169 LBS | HEIGHT: 61 IN | HEART RATE: 76 BPM | SYSTOLIC BLOOD PRESSURE: 127 MMHG | DIASTOLIC BLOOD PRESSURE: 70 MMHG

## 2021-12-10 DIAGNOSIS — M94.261 CHONDROMALACIA OF RIGHT KNEE: ICD-10-CM

## 2021-12-10 DIAGNOSIS — S83.241D ACUTE MEDIAL MENISCUS TEAR OF RIGHT KNEE, SUBSEQUENT ENCOUNTER: Primary | ICD-10-CM

## 2021-12-10 PROCEDURE — 4010F ACE/ARB THERAPY RXD/TAKEN: CPT | Mod: CPTII,S$GLB,, | Performed by: PHYSICIAN ASSISTANT

## 2021-12-10 PROCEDURE — 99499 UNLISTED E&M SERVICE: CPT | Mod: S$GLB,,, | Performed by: PHYSICIAN ASSISTANT

## 2021-12-10 PROCEDURE — 99499 NO LOS: ICD-10-PCS | Mod: S$GLB,,, | Performed by: PHYSICIAN ASSISTANT

## 2021-12-10 PROCEDURE — 99999 PR PBB SHADOW E&M-EST. PATIENT-LVL III: ICD-10-PCS | Mod: PBBFAC,,, | Performed by: PHYSICIAN ASSISTANT

## 2021-12-10 PROCEDURE — 99999 PR PBB SHADOW E&M-EST. PATIENT-LVL III: CPT | Mod: PBBFAC,,, | Performed by: PHYSICIAN ASSISTANT

## 2021-12-10 PROCEDURE — 4010F PR ACE/ARB THEARPY RXD/TAKEN: ICD-10-PCS | Mod: CPTII,S$GLB,, | Performed by: PHYSICIAN ASSISTANT

## 2021-12-10 RX ORDER — HYDROCODONE BITARTRATE AND ACETAMINOPHEN 10; 325 MG/1; MG/1
1 TABLET ORAL EVERY 6 HOURS PRN
Qty: 28 TABLET | Refills: 0 | Status: SHIPPED | OUTPATIENT
Start: 2021-12-10 | End: 2022-05-27

## 2021-12-10 RX ORDER — MUPIROCIN 20 MG/G
OINTMENT TOPICAL
Status: CANCELLED | OUTPATIENT
Start: 2021-12-10

## 2021-12-10 RX ORDER — ROPIVACAINE/EPI/CLONIDINE/KET 2.46-0.005
SYRINGE (ML) INJECTION ONCE
Status: CANCELLED | OUTPATIENT
Start: 2021-12-10 | End: 2021-12-10

## 2021-12-10 RX ORDER — CELECOXIB 200 MG/1
200 CAPSULE ORAL 2 TIMES DAILY WITH MEALS
Qty: 60 CAPSULE | Refills: 0 | Status: SHIPPED | OUTPATIENT
Start: 2021-12-10 | End: 2021-12-10

## 2021-12-10 RX ORDER — ASPIRIN 325 MG
TABLET ORAL
Qty: 42 TABLET | Refills: 0 | Status: ON HOLD | OUTPATIENT
Start: 2021-12-10 | End: 2023-01-31 | Stop reason: HOSPADM

## 2021-12-10 RX ORDER — METHOCARBAMOL 500 MG/1
500 TABLET, FILM COATED ORAL 3 TIMES DAILY PRN
Qty: 40 TABLET | Refills: 0 | Status: ON HOLD | OUTPATIENT
Start: 2021-12-10 | End: 2023-01-31 | Stop reason: SDUPTHER

## 2021-12-10 RX ORDER — SODIUM CHLORIDE 0.9 % (FLUSH) 0.9 %
10 SYRINGE (ML) INJECTION CONTINUOUS
Status: CANCELLED | OUTPATIENT
Start: 2021-12-10

## 2021-12-10 RX ORDER — PROMETHAZINE HYDROCHLORIDE 25 MG/1
25 TABLET ORAL EVERY 4 HOURS PRN
Qty: 30 TABLET | Refills: 0 | Status: SHIPPED | OUTPATIENT
Start: 2021-12-10 | End: 2022-04-06

## 2021-12-13 ENCOUNTER — PATIENT MESSAGE (OUTPATIENT)
Dept: PREADMISSION TESTING | Facility: HOSPITAL | Age: 76
End: 2021-12-13
Payer: MEDICARE

## 2021-12-15 ENCOUNTER — PATIENT MESSAGE (OUTPATIENT)
Dept: SPORTS MEDICINE | Facility: CLINIC | Age: 76
End: 2021-12-15
Payer: MEDICARE

## 2021-12-20 ENCOUNTER — TELEPHONE (OUTPATIENT)
Dept: SPORTS MEDICINE | Facility: CLINIC | Age: 76
End: 2021-12-20
Payer: MEDICARE

## 2021-12-20 ENCOUNTER — ANESTHESIA EVENT (OUTPATIENT)
Dept: SURGERY | Facility: HOSPITAL | Age: 76
End: 2021-12-20
Payer: MEDICARE

## 2021-12-21 ENCOUNTER — ANESTHESIA (OUTPATIENT)
Dept: SURGERY | Facility: HOSPITAL | Age: 76
End: 2021-12-21
Payer: MEDICARE

## 2021-12-21 ENCOUNTER — HOSPITAL ENCOUNTER (OUTPATIENT)
Facility: HOSPITAL | Age: 76
Discharge: HOME | End: 2021-12-21
Attending: ORTHOPAEDIC SURGERY | Admitting: ORTHOPAEDIC SURGERY
Payer: MEDICARE

## 2021-12-21 VITALS
RESPIRATION RATE: 19 BRPM | HEIGHT: 61 IN | OXYGEN SATURATION: 100 % | WEIGHT: 162 LBS | TEMPERATURE: 98 F | SYSTOLIC BLOOD PRESSURE: 144 MMHG | HEART RATE: 69 BPM | DIASTOLIC BLOOD PRESSURE: 77 MMHG | BODY MASS INDEX: 30.58 KG/M2

## 2021-12-21 DIAGNOSIS — M94.261 CHONDROMALACIA OF RIGHT KNEE: ICD-10-CM

## 2021-12-21 DIAGNOSIS — S83.8X1D ACUTE MEDIAL MENISCAL INJURY OF RIGHT KNEE, SUBSEQUENT ENCOUNTER: Primary | ICD-10-CM

## 2021-12-21 DIAGNOSIS — S83.241D ACUTE MEDIAL MENISCUS TEAR OF RIGHT KNEE, SUBSEQUENT ENCOUNTER: ICD-10-CM

## 2021-12-21 PROCEDURE — D9220A PRA ANESTHESIA: ICD-10-PCS | Mod: ANES,,, | Performed by: ANESTHESIOLOGY

## 2021-12-21 PROCEDURE — 27201423 OPTIME MED/SURG SUP & DEVICES STERILE SUPPLY: Performed by: ORTHOPAEDIC SURGERY

## 2021-12-21 PROCEDURE — 63600175 PHARM REV CODE 636 W HCPCS: Performed by: NURSE ANESTHETIST, CERTIFIED REGISTERED

## 2021-12-21 PROCEDURE — 76942 PR U/S GUIDANCE FOR NEEDLE GUIDANCE: ICD-10-PCS | Mod: 26,,, | Performed by: ANESTHESIOLOGY

## 2021-12-21 PROCEDURE — C1751 CATH, INF, PER/CENT/MIDLINE: HCPCS | Performed by: ANESTHESIOLOGY

## 2021-12-21 PROCEDURE — 71000015 HC POSTOP RECOV 1ST HR: Performed by: ORTHOPAEDIC SURGERY

## 2021-12-21 PROCEDURE — 63600175 PHARM REV CODE 636 W HCPCS: Performed by: ANESTHESIOLOGY

## 2021-12-21 PROCEDURE — 64448 PR NERVE BLOCK INJ, ANES/STEROID, FEMORAL, CONT INFUSION, INCL IMAG GUIDANCE: ICD-10-PCS | Mod: 59,RT,, | Performed by: ANESTHESIOLOGY

## 2021-12-21 PROCEDURE — 37000009 HC ANESTHESIA EA ADD 15 MINS: Performed by: ORTHOPAEDIC SURGERY

## 2021-12-21 PROCEDURE — 71000033 HC RECOVERY, INTIAL HOUR: Performed by: ORTHOPAEDIC SURGERY

## 2021-12-21 PROCEDURE — 63600175 PHARM REV CODE 636 W HCPCS: Performed by: SURGERY

## 2021-12-21 PROCEDURE — D9220A PRA ANESTHESIA: ICD-10-PCS | Mod: CRNA,,, | Performed by: NURSE ANESTHETIST, CERTIFIED REGISTERED

## 2021-12-21 PROCEDURE — 29881 PR KNEE SCOPE SINGLE MENISECECTOMY: ICD-10-PCS | Mod: RT,,, | Performed by: ORTHOPAEDIC SURGERY

## 2021-12-21 PROCEDURE — 71000039 HC RECOVERY, EACH ADD'L HOUR: Performed by: ORTHOPAEDIC SURGERY

## 2021-12-21 PROCEDURE — 76942 ECHO GUIDE FOR BIOPSY: CPT | Mod: 26,,, | Performed by: ANESTHESIOLOGY

## 2021-12-21 PROCEDURE — 36000710: Performed by: ORTHOPAEDIC SURGERY

## 2021-12-21 PROCEDURE — D9220A PRA ANESTHESIA: Mod: CRNA,,, | Performed by: NURSE ANESTHETIST, CERTIFIED REGISTERED

## 2021-12-21 PROCEDURE — 99900035 HC TECH TIME PER 15 MIN (STAT)

## 2021-12-21 PROCEDURE — 25000003 PHARM REV CODE 250: Performed by: ORTHOPAEDIC SURGERY

## 2021-12-21 PROCEDURE — 63600175 PHARM REV CODE 636 W HCPCS: Performed by: ORTHOPAEDIC SURGERY

## 2021-12-21 PROCEDURE — 36000711: Performed by: ORTHOPAEDIC SURGERY

## 2021-12-21 PROCEDURE — 63600175 PHARM REV CODE 636 W HCPCS: Performed by: PHYSICIAN ASSISTANT

## 2021-12-21 PROCEDURE — 64448 NJX AA&/STRD FEM NRV NFS IMG: CPT | Mod: 59,RT,, | Performed by: ANESTHESIOLOGY

## 2021-12-21 PROCEDURE — D9220A PRA ANESTHESIA: Mod: ANES,,, | Performed by: ANESTHESIOLOGY

## 2021-12-21 PROCEDURE — 94761 N-INVAS EAR/PLS OXIMETRY MLT: CPT

## 2021-12-21 PROCEDURE — 37000008 HC ANESTHESIA 1ST 15 MINUTES: Performed by: ORTHOPAEDIC SURGERY

## 2021-12-21 PROCEDURE — 76942 ECHO GUIDE FOR BIOPSY: CPT | Performed by: ANESTHESIOLOGY

## 2021-12-21 PROCEDURE — 25000003 PHARM REV CODE 250: Performed by: PHYSICIAN ASSISTANT

## 2021-12-21 PROCEDURE — 25000003 PHARM REV CODE 250: Performed by: NURSE ANESTHETIST, CERTIFIED REGISTERED

## 2021-12-21 PROCEDURE — 29881 ARTHRS KNE SRG MNISECTMY M/L: CPT | Mod: RT,,, | Performed by: ORTHOPAEDIC SURGERY

## 2021-12-21 RX ORDER — SODIUM CHLORIDE 0.9 % (FLUSH) 0.9 %
10 SYRINGE (ML) INJECTION
Status: DISCONTINUED | OUTPATIENT
Start: 2021-12-21 | End: 2021-12-21 | Stop reason: HOSPADM

## 2021-12-21 RX ORDER — OXYCODONE HYDROCHLORIDE 5 MG/1
5 TABLET ORAL
Status: DISCONTINUED | OUTPATIENT
Start: 2021-12-21 | End: 2021-12-21 | Stop reason: HOSPADM

## 2021-12-21 RX ORDER — ACETAMINOPHEN 500 MG
1000 TABLET ORAL
Status: DISPENSED | OUTPATIENT
Start: 2021-12-21 | End: 2021-12-21

## 2021-12-21 RX ORDER — MIDAZOLAM HYDROCHLORIDE 1 MG/ML
.5-4 INJECTION INTRAMUSCULAR; INTRAVENOUS
Status: DISCONTINUED | OUTPATIENT
Start: 2021-12-21 | End: 2023-08-06

## 2021-12-21 RX ORDER — SODIUM CHLORIDE 0.9 % (FLUSH) 0.9 %
10 SYRINGE (ML) INJECTION CONTINUOUS
Status: DISCONTINUED | OUTPATIENT
Start: 2021-12-21 | End: 2021-12-21 | Stop reason: HOSPADM

## 2021-12-21 RX ORDER — SUCCINYLCHOLINE CHLORIDE 20 MG/ML
INJECTION INTRAMUSCULAR; INTRAVENOUS
Status: DISCONTINUED | OUTPATIENT
Start: 2021-12-21 | End: 2021-12-21

## 2021-12-21 RX ORDER — LIDOCAINE HYDROCHLORIDE 10 MG/ML
1 INJECTION, SOLUTION EPIDURAL; INFILTRATION; INTRACAUDAL; PERINEURAL ONCE AS NEEDED
Status: DISCONTINUED | OUTPATIENT
Start: 2021-12-21 | End: 2023-08-06

## 2021-12-21 RX ORDER — ROPIVACAINE HYDROCHLORIDE 2 MG/ML
INJECTION, SOLUTION EPIDURAL; INFILTRATION; PERINEURAL CONTINUOUS
Status: DISCONTINUED | OUTPATIENT
Start: 2021-12-21 | End: 2023-08-06

## 2021-12-21 RX ORDER — ROPIVACAINE HYDROCHLORIDE 5 MG/ML
INJECTION, SOLUTION EPIDURAL; INFILTRATION; PERINEURAL
Status: COMPLETED | OUTPATIENT
Start: 2021-12-21 | End: 2021-12-21

## 2021-12-21 RX ORDER — LIDOCAINE HYDROCHLORIDE 20 MG/ML
INJECTION INTRAVENOUS
Status: DISCONTINUED | OUTPATIENT
Start: 2021-12-21 | End: 2021-12-21

## 2021-12-21 RX ORDER — MUPIROCIN 20 MG/G
OINTMENT TOPICAL
Status: DISCONTINUED | OUTPATIENT
Start: 2021-12-21 | End: 2021-12-21 | Stop reason: HOSPADM

## 2021-12-21 RX ORDER — CELECOXIB 200 MG/1
400 CAPSULE ORAL ONCE
Status: DISCONTINUED | OUTPATIENT
Start: 2021-12-21 | End: 2023-08-06

## 2021-12-21 RX ORDER — VANCOMYCIN HCL IN 5 % DEXTROSE 1G/250ML
1000 PLASTIC BAG, INJECTION (ML) INTRAVENOUS
Status: COMPLETED | OUTPATIENT
Start: 2021-12-21 | End: 2021-12-21

## 2021-12-21 RX ORDER — HALOPERIDOL 5 MG/ML
0.5 INJECTION INTRAMUSCULAR EVERY 10 MIN PRN
Status: DISCONTINUED | OUTPATIENT
Start: 2021-12-21 | End: 2021-12-21 | Stop reason: HOSPADM

## 2021-12-21 RX ORDER — LIDOCAINE HYDROCHLORIDE 10 MG/ML
1 INJECTION, SOLUTION EPIDURAL; INFILTRATION; INTRACAUDAL; PERINEURAL ONCE
Status: DISCONTINUED | OUTPATIENT
Start: 2021-12-21 | End: 2023-08-06

## 2021-12-21 RX ORDER — ROPIVACAINE/EPI/CLONIDINE/KET 2.46-0.005
SYRINGE (ML) INJECTION
Status: DISCONTINUED | OUTPATIENT
Start: 2021-12-21 | End: 2021-12-21 | Stop reason: HOSPADM

## 2021-12-21 RX ORDER — ROCURONIUM BROMIDE 10 MG/ML
INJECTION, SOLUTION INTRAVENOUS
Status: DISCONTINUED | OUTPATIENT
Start: 2021-12-21 | End: 2021-12-21

## 2021-12-21 RX ORDER — ONDANSETRON 2 MG/ML
4 INJECTION INTRAMUSCULAR; INTRAVENOUS DAILY PRN
Status: DISCONTINUED | OUTPATIENT
Start: 2021-12-21 | End: 2021-12-21 | Stop reason: HOSPADM

## 2021-12-21 RX ORDER — PROPOFOL 10 MG/ML
VIAL (ML) INTRAVENOUS
Status: DISCONTINUED | OUTPATIENT
Start: 2021-12-21 | End: 2021-12-21

## 2021-12-21 RX ORDER — SODIUM CHLORIDE 9 MG/ML
INJECTION, SOLUTION INTRAVENOUS CONTINUOUS
Status: DISCONTINUED | OUTPATIENT
Start: 2021-12-21 | End: 2021-12-21 | Stop reason: HOSPADM

## 2021-12-21 RX ORDER — PHENYLEPHRINE HYDROCHLORIDE 10 MG/ML
INJECTION INTRAVENOUS
Status: DISCONTINUED | OUTPATIENT
Start: 2021-12-21 | End: 2021-12-21

## 2021-12-21 RX ORDER — EPINEPHRINE 1 MG/ML
INJECTION INTRAMUSCULAR; INTRAVENOUS; SUBCUTANEOUS
Status: DISCONTINUED | OUTPATIENT
Start: 2021-12-21 | End: 2021-12-21 | Stop reason: HOSPADM

## 2021-12-21 RX ORDER — FENTANYL CITRATE 50 UG/ML
25 INJECTION, SOLUTION INTRAMUSCULAR; INTRAVENOUS EVERY 5 MIN PRN
Status: DISCONTINUED | OUTPATIENT
Start: 2021-12-21 | End: 2021-12-21 | Stop reason: HOSPADM

## 2021-12-21 RX ORDER — FENTANYL CITRATE 50 UG/ML
25-200 INJECTION, SOLUTION INTRAMUSCULAR; INTRAVENOUS
Status: DISCONTINUED | OUTPATIENT
Start: 2021-12-21 | End: 2023-08-06

## 2021-12-21 RX ADMIN — SODIUM CHLORIDE: 0.9 INJECTION, SOLUTION INTRAVENOUS at 07:12

## 2021-12-21 RX ADMIN — FENTANYL CITRATE 100 MCG: 50 INJECTION, SOLUTION INTRAMUSCULAR; INTRAVENOUS at 09:12

## 2021-12-21 RX ADMIN — PROPOFOL 140 MG: 10 INJECTION, EMULSION INTRAVENOUS at 10:12

## 2021-12-21 RX ADMIN — MIDAZOLAM HYDROCHLORIDE 2 MG: 1 INJECTION, SOLUTION INTRAMUSCULAR; INTRAVENOUS at 09:12

## 2021-12-21 RX ADMIN — ROPIVACAINE HYDROCHLORIDE 7.5 ML: 5 INJECTION, SOLUTION EPIDURAL; INFILTRATION; PERINEURAL at 09:12

## 2021-12-21 RX ADMIN — SUCCINYLCHOLINE CHLORIDE 200 MG: 20 INJECTION, SOLUTION INTRAMUSCULAR; INTRAVENOUS at 10:12

## 2021-12-21 RX ADMIN — LIDOCAINE HYDROCHLORIDE 75 MG: 20 INJECTION, SOLUTION INTRAVENOUS at 10:12

## 2021-12-21 RX ADMIN — MUPIROCIN: 20 OINTMENT TOPICAL at 07:12

## 2021-12-21 RX ADMIN — Medication: at 11:12

## 2021-12-21 RX ADMIN — PHENYLEPHRINE HYDROCHLORIDE 100 MCG: 10 INJECTION INTRAVENOUS at 10:12

## 2021-12-21 RX ADMIN — ROCURONIUM BROMIDE 10 MG: 10 INJECTION, SOLUTION INTRAVENOUS at 10:12

## 2021-12-21 RX ADMIN — VANCOMYCIN HYDROCHLORIDE 1000 MG: 1 INJECTION, POWDER, LYOPHILIZED, FOR SOLUTION INTRAVENOUS at 07:12

## 2021-12-22 ENCOUNTER — TELEPHONE (OUTPATIENT)
Dept: SPORTS MEDICINE | Facility: CLINIC | Age: 76
End: 2021-12-22
Payer: MEDICARE

## 2021-12-23 ENCOUNTER — CLINICAL SUPPORT (OUTPATIENT)
Dept: REHABILITATION | Facility: HOSPITAL | Age: 76
End: 2021-12-23
Payer: MEDICARE

## 2021-12-23 DIAGNOSIS — M62.81 QUADRICEPS WEAKNESS: Primary | ICD-10-CM

## 2021-12-23 DIAGNOSIS — Z98.890 S/P MENISCECTOMY: ICD-10-CM

## 2021-12-23 DIAGNOSIS — Z78.9 IMPAIRED MOBILITY AND ADLS: ICD-10-CM

## 2021-12-23 DIAGNOSIS — Z74.09 IMPAIRED MOBILITY AND ADLS: ICD-10-CM

## 2021-12-23 DIAGNOSIS — G89.18 POST-OPERATIVE PAIN: ICD-10-CM

## 2021-12-23 DIAGNOSIS — M25.661 DECREASED RANGE OF MOTION (ROM) OF RIGHT KNEE: ICD-10-CM

## 2021-12-23 PROCEDURE — 97162 PT EVAL MOD COMPLEX 30 MIN: CPT | Mod: KX,PN

## 2021-12-23 PROCEDURE — 97110 THERAPEUTIC EXERCISES: CPT | Mod: KX,PN

## 2021-12-27 ENCOUNTER — CLINICAL SUPPORT (OUTPATIENT)
Dept: REHABILITATION | Facility: HOSPITAL | Age: 76
End: 2021-12-27
Payer: MEDICARE

## 2021-12-27 DIAGNOSIS — G89.18 POST-OPERATIVE PAIN: ICD-10-CM

## 2021-12-27 DIAGNOSIS — Z98.890 S/P MENISCECTOMY: ICD-10-CM

## 2021-12-27 PROCEDURE — 97110 THERAPEUTIC EXERCISES: CPT | Mod: KX,PN

## 2021-12-30 ENCOUNTER — CLINICAL SUPPORT (OUTPATIENT)
Dept: REHABILITATION | Facility: HOSPITAL | Age: 76
End: 2021-12-30
Payer: MEDICARE

## 2021-12-30 DIAGNOSIS — G89.18 POST-OPERATIVE PAIN: ICD-10-CM

## 2021-12-30 DIAGNOSIS — Z98.890 S/P MENISCECTOMY: ICD-10-CM

## 2021-12-30 PROCEDURE — 97110 THERAPEUTIC EXERCISES: CPT | Mod: KX,PN

## 2022-01-03 ENCOUNTER — CLINICAL SUPPORT (OUTPATIENT)
Dept: REHABILITATION | Facility: HOSPITAL | Age: 77
End: 2022-01-03
Payer: MEDICARE

## 2022-01-03 DIAGNOSIS — Z98.890 S/P MENISCECTOMY: Primary | ICD-10-CM

## 2022-01-03 DIAGNOSIS — G89.18 POST-OPERATIVE PAIN: ICD-10-CM

## 2022-01-03 PROCEDURE — 97110 THERAPEUTIC EXERCISES: CPT | Mod: PN,CQ

## 2022-01-03 NOTE — PROGRESS NOTES
[facility]   Physical Therapy Treatment Note     Name: Kristin Goodman  Clinic Number: [id number]    Therapy Diagnosis:   Encounter Diagnoses   Name Primary?    S/P meniscectomy Yes    Post-operative pain      Physician: Elly Sullivan MD  Visit Date: 1/3/2022    Physician Orders: PT Eval and Treat   Medical Diagnosis from Referral: S83.241A (ICD-10-CM) - Other tear of medial meniscus, current injury, right knee, initial encounter   Evaluation Date: 12/23/2021  Authorization Period Expiration: pending  Plan of Care Expiration: 3/19/22  Progress Note Due: 1/20/22  Visit # / Visits authorized: 4/ pending   FOTO: 1/3  PTA visit 1/5     Precautions: Standard and Post op   PROCEDURES(S) PERFORMED:      DOS: 12/21/21 POW 1  1. Right Arthroscopy, with meniscectomy (medial OR lateral) 36033, medial  2.  Right Arthroscopy, debridement/shaving of articular cartilage (chondroplasty) 22176  3.  Right Arthroscopy, knee, synovectomy, limited 55307     PHYSICAL THERAPY:  Weight bearing:as tolerated  right leg  Range of Motion:Full normal motion symmetric to opposite side     Immobilizer if present should be unlocked @ 120 degrees with gait and allowed to flex to 120 degrees at rest.     Time In: 8:22 am  Time Out: 9:07 am  Total Billable Time: 45 minutes    SUBJECTIVE     Pt reports: compliance with HEP.  She reports Minimal R knee pain and stiffness currently.   She was compliant with home exercise program.  Response to previous treatment: no adverse reactions  Functional change: able to ambulate short distances without cane    Pain: 3/10  Location: right knee      OBJECTIVE      Range of Motion:   Knee Left active Right Active   Flexion 115--NT today 1/3/22 120 AAROM 1/3/22   Extension +5--NT today 1/3/22 0  1/3/22      TREATMENT     Kristin received therapeutic exercises to develop strength and ROM for 40 minutes including:  \  Ex's in bold performed today     Recumbent Bike 7 mins Seat 7 ( cues for  proper hip/knee positioning to avoid compensations)    PROM knee flexion edge of mat 3 min  R Knee Flex AAROM with L LE assist 10 x 10 sec  Heel slides 10 x 10 sec with strap assist.  Heel prop with gastroc stretch 5# 3 mins  Quad set wiith heel prop x 20 x 5 sec  SLR 1 x10 (A/A), 1 x 10 Active.  SAQ 2x10  Sidelying hip abduction 2x10 R  +Sidelying Hip add 2 x 10    Sit to Stand from elevated mat 10x--NP   Standing calf raise 2x10  +Rocker Board weight shifts A/P and laterally x 20 each with light UE support.  Lunge stretch on step 20x  Mini squats + compliant foam 2x10  Standing TKE with RTB 2 x 10 ( Cues to avoid hip compensation)    Kristin received the following manual therapy techniques: Joint mobilizations were applied to the: R knee for 5 minutes, including:  Patella mobs G II all directions    Kristin received cold pack for 10 minutes to R knee. ( Patient declined stating she will apply at home)    PATIENT EDUCATION AND HOME EXERCISES     Home Exercises Provided and Patient Education Provided     Education provided:   - continue with HEP    Written Home Exercises Provided: yes. Exercises were reviewed and Kristin was able to demonstrate them prior to the end of the session.  Kristin demonstrated good  understanding of the education provided. See EMR under Patient Instructions for exercises provided during therapy sessions    ASSESSMENT   Pt tolerated treatment session well.  Patient demonstrating improving (R) knee strength and ROM and was able to perform ex's/activities within tolerable level of muscular fatigue and discomfort.  She was progressed to perform light proprioceptive challenges today to include: Mini squats on compliant foam and rocker board weight shifts which she was able to perform with light Ue support. Patient states that she has been pleased with her progress thus far. Pain level = slight/tolerable post session.      Kristin Is progressing well towards her goals.   Pt prognosis is Good.     Pt will  continue to benefit from skilled outpatient physical therapy to address the deficits listed in the problem list box on initial evaluation, provide pt/family education and to maximize pt's level of independence in the home and community environment.     Pt's spiritual, cultural and educational needs considered and pt agreeable to plan of care and goals.     Anticipated Barriers for therapy: chronicity of knee pain     GOALS: Short Term Goals:  4 weeks   1.Report decreased in pain at worse less than  <   / =  6/10  to increase tolerance for functional mobility. in progress  2. Pt to improve R knee flexion range of motion to 90 degrees to allow for improved functional mobility to allow for improvement in IADLs.  in progress  3. Increased R knee MMT >/= 4/5 to increase tolerance for ADL and work activities. in progress  4. Pt to tolerate HEP to improve ROM and independence with ADL's in progress    Long Term Goals: 8 weeks in progress  1.Report decreased in pain at worse less than  <   / =  2/10  to increase tolerance for functional mobility.  2.Pt to improve R knee flexion to >/= 115 degrees to allow for improved functional mobility to allow for improvement in IADLs.   3.Increased R  knee MMT >/= 4+/5 to increase tolerance for ADL and work activities.  4. Pt will report at </=40 % limitation on FOTO  to demonstrate increase in LE function with every day tasks.   5. Pt to be Independent with HEP to improve ROM and independence with ADL's  6. Pt will demonstrate ability to ascend/descend 6 inch step using single handrail to increase safety during community ambulation.    PLAN     Continue with skilled PT towards POC.     Ej French, PTA

## 2022-01-07 ENCOUNTER — OFFICE VISIT (OUTPATIENT)
Dept: SPORTS MEDICINE | Facility: CLINIC | Age: 77
End: 2022-01-07
Payer: MEDICARE

## 2022-01-07 ENCOUNTER — CLINICAL SUPPORT (OUTPATIENT)
Dept: REHABILITATION | Facility: HOSPITAL | Age: 77
End: 2022-01-07
Payer: MEDICARE

## 2022-01-07 VITALS
SYSTOLIC BLOOD PRESSURE: 145 MMHG | HEART RATE: 73 BPM | HEIGHT: 61 IN | BODY MASS INDEX: 30.58 KG/M2 | DIASTOLIC BLOOD PRESSURE: 74 MMHG | WEIGHT: 162 LBS

## 2022-01-07 DIAGNOSIS — Z98.890 S/P ARTHROSCOPIC SURGERY OF RIGHT KNEE: Primary | ICD-10-CM

## 2022-01-07 DIAGNOSIS — G89.18 POST-OPERATIVE PAIN: ICD-10-CM

## 2022-01-07 DIAGNOSIS — Z98.890 S/P MENISCECTOMY: ICD-10-CM

## 2022-01-07 PROCEDURE — 1159F PR MEDICATION LIST DOCUMENTED IN MEDICAL RECORD: ICD-10-PCS | Mod: CPTII,S$GLB,, | Performed by: PHYSICIAN ASSISTANT

## 2022-01-07 PROCEDURE — 99024 PR POST-OP FOLLOW-UP VISIT: ICD-10-PCS | Mod: S$GLB,,, | Performed by: PHYSICIAN ASSISTANT

## 2022-01-07 PROCEDURE — 1125F PR PAIN SEVERITY QUANTIFIED, PAIN PRESENT: ICD-10-PCS | Mod: CPTII,S$GLB,, | Performed by: PHYSICIAN ASSISTANT

## 2022-01-07 PROCEDURE — 3078F DIAST BP <80 MM HG: CPT | Mod: CPTII,S$GLB,, | Performed by: PHYSICIAN ASSISTANT

## 2022-01-07 PROCEDURE — 3008F BODY MASS INDEX DOCD: CPT | Mod: CPTII,S$GLB,, | Performed by: PHYSICIAN ASSISTANT

## 2022-01-07 PROCEDURE — 99024 POSTOP FOLLOW-UP VISIT: CPT | Mod: S$GLB,,, | Performed by: PHYSICIAN ASSISTANT

## 2022-01-07 PROCEDURE — 1101F PT FALLS ASSESS-DOCD LE1/YR: CPT | Mod: CPTII,S$GLB,, | Performed by: PHYSICIAN ASSISTANT

## 2022-01-07 PROCEDURE — 3078F PR MOST RECENT DIASTOLIC BLOOD PRESSURE < 80 MM HG: ICD-10-PCS | Mod: CPTII,S$GLB,, | Performed by: PHYSICIAN ASSISTANT

## 2022-01-07 PROCEDURE — 3288F FALL RISK ASSESSMENT DOCD: CPT | Mod: CPTII,S$GLB,, | Performed by: PHYSICIAN ASSISTANT

## 2022-01-07 PROCEDURE — 1101F PR PT FALLS ASSESS DOC 0-1 FALLS W/OUT INJ PAST YR: ICD-10-PCS | Mod: CPTII,S$GLB,, | Performed by: PHYSICIAN ASSISTANT

## 2022-01-07 PROCEDURE — 99999 PR PBB SHADOW E&M-EST. PATIENT-LVL IV: CPT | Mod: PBBFAC,,, | Performed by: PHYSICIAN ASSISTANT

## 2022-01-07 PROCEDURE — 1160F RVW MEDS BY RX/DR IN RCRD: CPT | Mod: CPTII,S$GLB,, | Performed by: PHYSICIAN ASSISTANT

## 2022-01-07 PROCEDURE — 97110 THERAPEUTIC EXERCISES: CPT | Mod: PN

## 2022-01-07 PROCEDURE — 3288F PR FALLS RISK ASSESSMENT DOCUMENTED: ICD-10-PCS | Mod: CPTII,S$GLB,, | Performed by: PHYSICIAN ASSISTANT

## 2022-01-07 PROCEDURE — 3077F PR MOST RECENT SYSTOLIC BLOOD PRESSURE >= 140 MM HG: ICD-10-PCS | Mod: CPTII,S$GLB,, | Performed by: PHYSICIAN ASSISTANT

## 2022-01-07 PROCEDURE — 1160F PR REVIEW ALL MEDS BY PRESCRIBER/CLIN PHARMACIST DOCUMENTED: ICD-10-PCS | Mod: CPTII,S$GLB,, | Performed by: PHYSICIAN ASSISTANT

## 2022-01-07 PROCEDURE — 3008F PR BODY MASS INDEX (BMI) DOCUMENTED: ICD-10-PCS | Mod: CPTII,S$GLB,, | Performed by: PHYSICIAN ASSISTANT

## 2022-01-07 PROCEDURE — 1125F AMNT PAIN NOTED PAIN PRSNT: CPT | Mod: CPTII,S$GLB,, | Performed by: PHYSICIAN ASSISTANT

## 2022-01-07 PROCEDURE — 1159F MED LIST DOCD IN RCRD: CPT | Mod: CPTII,S$GLB,, | Performed by: PHYSICIAN ASSISTANT

## 2022-01-07 PROCEDURE — 3077F SYST BP >= 140 MM HG: CPT | Mod: CPTII,S$GLB,, | Performed by: PHYSICIAN ASSISTANT

## 2022-01-07 PROCEDURE — 99999 PR PBB SHADOW E&M-EST. PATIENT-LVL IV: ICD-10-PCS | Mod: PBBFAC,,, | Performed by: PHYSICIAN ASSISTANT

## 2022-01-07 NOTE — PATIENT INSTRUCTIONS
"Heel Slides      While in a sitting position, place a small hand towel under your heel. Next, loop a belt, towel or bed sheet around your foot and pull your knee into a bend position as your foot slides towards your buttock. Hold a gentle stretch and then return back to original position. Perform for 5 minutes at a time, holding the stretch for 5 seconds each time and relaxing. Perform several times daily.     Gluteal Bridges    While lying on your back with knees bent, tighten your lower abdominal muscles, squeeze your buttocks and then raise your buttocks off the floor/bed as creating a "Bridge" with your body. Hold and then lower yourself and repeat.    Perform 3 sets of 10 for a total of 30 times, holding each for 5 seconds.     Straight Leg Raises    While lying on your back, raise up your leg with a straight knee.  Keep the opposite knee bent with the foot planted on the ground.  Perform 3 sets of 10 for a total of 30 times    Clamshells    While lying on your side with your knees bent and an elastic band wrapped around your knees, draw up the top knee while keeping contact of your feet together as shown.     Do not let your pelvis roll back during the lifting movement.      Perform 3 sets of 10 for a total of 30 times    Long Arc Quads    In a seated position on a stable high surface, use the uninvolved leg to push the involved Knee up to an extended position. Slowly bring the leg down to flexed position.     Perform 3 sets of 10 for a total of 30 times, holding each for 3 seconds.   "

## 2022-01-07 NOTE — PROGRESS NOTES
[facility]   Physical Therapy Treatment Note     Name: Kristin Goodman  Clinic Number: [id number]    Therapy Diagnosis:   Encounter Diagnoses   Name Primary?    S/P meniscectomy     Post-operative pain      Physician: Elly Sullivan MD  Visit Date: 1/7/2022    Physician Orders: PT Eval and Treat   Medical Diagnosis from Referral: S83.241A (ICD-10-CM) - Other tear of medial meniscus, current injury, right knee, initial encounter   Evaluation Date: 12/23/2021  Authorization Period Expiration: pending  Plan of Care Expiration: 3/19/22  Progress Note Due: 1/20/22  Visit # / Visits authorized: 4/ pending   FOTO: 1/3  PTA visit 1/5     Precautions: Standard and Post op   PROCEDURES(S) PERFORMED:  DOS: 12/21/21 POW 1  1. Right Arthroscopy, with meniscectomy (medial OR lateral) 36924, medial  2.  Right Arthroscopy, debridement/shaving of articular cartilage (chondroplasty) 47631  3.  Right Arthroscopy, knee, synovectomy, limited 28345     PHYSICAL THERAPY:  Weight bearing:as tolerated  right leg  Range of Motion:Full normal motion symmetric to opposite side     Immobilizer if present should be unlocked @ 120 degrees with gait and allowed to flex to 120 degrees at rest.     Time In: 7:03 AM  Time Out: 7:50 AM  Total Billable Time: 47 minutes    SUBJECTIVE     Pt reports: She's been ambulating within the home without her cane and within the community with a SPC. States her brace causes more pain to her knee, so she only wears it sometimes. Currently has pain near incisions and at medial knee joint.   She was compliant with home exercise program.  Response to previous treatment: no adverse reactions  Functional change: able to ambulate short distances without cane    Pain: 7/10  Location: right knee      OBJECTIVE      Range of Motion:   Knee Left active Right Active   Flexion 115 105   Extension +5 +5      TREATMENT     Kristin received therapeutic exercises to develop strength and ROM for 40  "minutes including:    Recumbent Bike: 7 minutes, Level 2.0  Heel Slides: 3 minutes, 5" holds  DL Gluteal Bridges: 3x10, 3" holds  Supine SLR's: 3x10, RLE  Sidelying Clamshells: 3x10, GTB  LAQ's: 3x10, 3" holds  Sit to Stands: 3x10      Not Performed Today:  Quad set wiith heel prop x 20 x 5 sec  Standing calf raise 2x10  Mini squats + compliant foam 2x10  Standing TKE with RTB 2 x 10 ( Cues to avoid hip compensation)      Kristin received the following manual therapy techniques: Joint mobilizations were applied to the: R knee for 5 minutes, including:  Grade III/IV patellar mobilizations in all directions  Infrapatellar fat pad mobilizations   Instructions on self-mobilizations      PATIENT EDUCATION AND HOME EXERCISES     Home Exercises Provided and Patient Education Provided     Education provided:   - continue with HEP    Written Home Exercises Provided: yes. Exercises were reviewed and Kristin was able to demonstrate them prior to the end of the session.  Kristin demonstrated good  understanding of the education provided. See EMR under Patient Instructions for exercises provided during therapy sessions    ASSESSMENT   Kristin presented to therapy with reports of increased pain since last session, however able to participate in session with an overall decrease in pain by end of session. Presented with full active hyperextension to 5 degrees with some pain noted near fat pad. Improved knee flexion to 105 degrees by end of session. Session today focused on continuing to improve ROM, quad activation and strength, and gluteal strength. Overall patient continues to benefit from participating in skilled physical therapy in order to work towards functional goals.     Kristin Is progressing well towards her goals.   Pt prognosis is Good.     Pt will continue to benefit from skilled outpatient physical therapy to address the deficits listed in the problem list box on initial evaluation, provide pt/family education and to maximize " pt's level of independence in the home and community environment.     Pt's spiritual, cultural and educational needs considered and pt agreeable to plan of care and goals.     Anticipated Barriers for therapy: chronicity of knee pain     GOALS: Short Term Goals:  4 weeks   1.Report decreased in pain at worse less than  <   / =  6/10  to increase tolerance for functional mobility. in progress  2. Pt to improve R knee flexion range of motion to 90 degrees to allow for improved functional mobility to allow for improvement in IADLs.  in progress  3. Increased R knee MMT >/= 4/5 to increase tolerance for ADL and work activities. in progress  4. Pt to tolerate HEP to improve ROM and independence with ADL's in progress    Long Term Goals: 8 weeks in progress  1.Report decreased in pain at worse less than  <   / =  2/10  to increase tolerance for functional mobility.  2.Pt to improve R knee flexion to >/= 115 degrees to allow for improved functional mobility to allow for improvement in IADLs.   3.Increased R  knee MMT >/= 4+/5 to increase tolerance for ADL and work activities.  4. Pt will report at </=40 % limitation on FOTO  to demonstrate increase in LE function with every day tasks.   5. Pt to be Independent with HEP to improve ROM and independence with ADL's  6. Pt will demonstrate ability to ascend/descend 6 inch step using single handrail to increase safety during community ambulation.    PLAN     Continue with skilled PT towards POC.     Kailee Fabian, PT

## 2022-01-07 NOTE — PROGRESS NOTES
CC: Right knee pain    History of present illness: Pt is here today for post-operative followup of knee arthroscopy.  she is doing well.  We have reviewed her findings and discussed plan of care and future treatment options.      She is doing well.   Pain is improving. Only taking occasional robaxin and tylenol or pain.   She still uses a cane to ambulate outside of her home and no longer uses it inside her home.   She is Full weight bearing.   Knee is feeling improved.     DATE OF PROCEDURE: 12/21/2021     ATTENDING SURGEON: Surgeon(s) and Role:     * Elly Sullivan MD - Primary  PROCEDURES(S) PERFORMED:   1. Right  Arthroscopy, with meniscectomy (medial OR lateral) 33276, medial  2.  Right  Arthroscopy, debridement/shaving of articular cartilage (chondroplasty) 69417  3.  Right  Arthroscopy, knee, synovectomy, limited 35202                                                                               PHYSICAL EXAMINATION:     Incision sites healed well  No evidence of any erythema, infection or induration  Range of motion 0-100 degrees  Minimal effusion  2+ DP pulse  No swelling, no calf tenderness  - Merry's sign  Negative medial joint line tenderness  Moderate quad atrophy                                                                                 ASSESSMENT:                                                                                                                                               1. Status post above, doing well.                                                                                                                               PLAN:                                                                                                                                                     1. Continue with PT. Can wean cane as tolerated.   Continue ice compresses as needed.   2. Emphasized quad function.  3. I have discussed return to activity in detail.  4.Patient will see us back at 6  week post-op wilbert.                                    5. All questions were answered and patient should contact us if she  has any questions or concerns in the interim.

## 2022-01-10 ENCOUNTER — CLINICAL SUPPORT (OUTPATIENT)
Dept: REHABILITATION | Facility: HOSPITAL | Age: 77
End: 2022-01-10
Payer: MEDICARE

## 2022-01-10 DIAGNOSIS — G89.18 POST-OPERATIVE PAIN: ICD-10-CM

## 2022-01-10 DIAGNOSIS — Z98.890 S/P MENISCECTOMY: ICD-10-CM

## 2022-01-10 PROCEDURE — 97110 THERAPEUTIC EXERCISES: CPT | Mod: PN

## 2022-01-10 NOTE — PROGRESS NOTES
[facility]   Physical Therapy Treatment Note     Name: Kristin Goodman  Clinic Number: [id number]    Therapy Diagnosis:   Encounter Diagnoses   Name Primary?    S/P meniscectomy     Post-operative pain      Physician: Elly Sullivan MD  Visit Date: 1/10/2022    Physician Orders: PT Eval and Treat   Medical Diagnosis from Referral: S83.241A (ICD-10-CM) - Other tear of medial meniscus, current injury, right knee, initial encounter   Evaluation Date: 12/23/2021  Authorization Period Expiration: pending  Plan of Care Expiration: 3/19/22  Progress Note Due: 1/20/22  Visit # / Visits authorized: 3/12 (+3 previous referral)  FOTO: 1/3   PTA visit 0/5     Precautions: Standard and Post op   PROCEDURES(S) PERFORMED:  DOS: 12/21/21 POW 3 on 1/11/22  1. Right Arthroscopy, with meniscectomy (medial OR lateral) 63746, medial  2.  Right Arthroscopy, debridement/shaving of articular cartilage (chondroplasty) 26777  3.  Right Arthroscopy, knee, synovectomy, limited 13215     PHYSICAL THERAPY:  Weight bearing:as tolerated  right leg  Range of Motion:Full normal motion symmetric to opposite side     Immobilizer if present should be unlocked @ 120 degrees with gait and allowed to flex to 120 degrees at rest.     Time In: 858  Time Out: 945  Total Billable Time: 47 minutes    SUBJECTIVE     Pt reports: She is doing well, presents today without AD. Feeling primarily stiffness this morning, no sharp pain or discomfort. Able to DC brace following PA-C visit  She was compliant with home exercise program.  Response to previous treatment: no adverse reactions  Functional change: able to ambulate short distances without cane    Pain: 3/10  Location: right knee      OBJECTIVE      Range of Motion:   Knee Left active Right Active   Flexion 115 120 AAROM   Extension +5 +5      TREATMENT     Kristin received therapeutic exercises to develop strength and ROM for 40 minutes including:    Recumbent Bike: 6 minutes, Level  "3.0  +Calf Str c/ incline board 3 x 30"  +HS Str at stairs 3 x 30"  TKE with pink cord 2x10  Standing calf raise 3x10  +Shuttle BLE Press 2 cords 3x10  Sit to Stands 18"box + airex foam  2x10 5# kb  LAQ's: 3x10, 3" holds 2#    Heel Slides:  10x 10" holds  SLR's 3x10 1# RLE  DL Gluteal Bridges GTB 2x10, 3" holds  Sidelying Clamshells: 3x10, GTB    Not Performed Today:  Quad set wiith heel prop x 20 x 5 sec  Mini squats + compliant foam 2x10    Kristin received the following manual therapy techniques: Joint mobilizations were applied to the: R knee for 5 minutes, including:  Grade III/IV patellar mobilizations in all directions  Infrapatellar fat pad mobilizations   Instructions on self-mobilizations    PATIENT EDUCATION AND HOME EXERCISES     Home Exercises Provided and Patient Education Provided     Education provided:   - continue with HEP    Written Home Exercises Provided: yes. Exercises were reviewed and Kristin was able to demonstrate them prior to the end of the session.  Kristin demonstrated good  understanding of the education provided. See EMR under Patient Instructions for exercises provided during therapy sessions    ASSESSMENT     Kristin tolerated treatment session well, with no acute pain provocation during session. Improved knee flexion AAROM to 120 with mild over-pressure. Closed-chain strengthening progressed with shuttle press and increased resistance during sit/stand. Continue to progress for full ROM, strength, endurance, and balance in future sessions    Kristin Is progressing well towards her goals.   Pt prognosis is Good.     Pt will continue to benefit from skilled outpatient physical therapy to address the deficits listed in the problem list box on initial evaluation, provide pt/family education and to maximize pt's level of independence in the home and community environment.     Pt's spiritual, cultural and educational needs considered and pt agreeable to plan of care and goals.     Anticipated " Barriers for therapy: chronicity of knee pain     GOALS: Short Term Goals:  4 weeks   1.Report decreased in pain at worse less than  <   / =  6/10  to increase tolerance for functional mobility. in progress  2. Pt to improve R knee flexion range of motion to 90 degrees to allow for improved functional mobility to allow for improvement in IADLs.  in progress  3. Increased R knee MMT >/= 4/5 to increase tolerance for ADL and work activities. in progress  4. Pt to tolerate HEP to improve ROM and independence with ADL's in progress    Long Term Goals: 8 weeks in progress  1.Report decreased in pain at worse less than  <   / =  2/10  to increase tolerance for functional mobility.  2.Pt to improve R knee flexion to >/= 115 degrees to allow for improved functional mobility to allow for improvement in IADLs.   3.Increased R  knee MMT >/= 4+/5 to increase tolerance for ADL and work activities.  4. Pt will report at </=40 % limitation on FOTO  to demonstrate increase in LE function with every day tasks.   5. Pt to be Independent with HEP to improve ROM and independence with ADL's  6. Pt will demonstrate ability to ascend/descend 6 inch step using single handrail to increase safety during community ambulation.    PLAN     Continue with skilled PT towards POC.     Romulo Cheema, PT

## 2022-01-14 ENCOUNTER — CLINICAL SUPPORT (OUTPATIENT)
Dept: REHABILITATION | Facility: HOSPITAL | Age: 77
End: 2022-01-14
Payer: MEDICARE

## 2022-01-14 DIAGNOSIS — G89.18 POST-OPERATIVE PAIN: ICD-10-CM

## 2022-01-14 DIAGNOSIS — Z98.890 S/P MENISCECTOMY: ICD-10-CM

## 2022-01-14 PROCEDURE — 97110 THERAPEUTIC EXERCISES: CPT | Mod: PN

## 2022-01-14 NOTE — PROGRESS NOTES
[facility]   Physical Therapy Treatment Note     Name: Kristin Goodman  Clinic Number: [id number]    Therapy Diagnosis:   Encounter Diagnoses   Name Primary?    S/P meniscectomy     Post-operative pain      Physician: Elly Sullivan MD  Visit Date: 1/14/2022    Physician Orders: PT Eval and Treat   Medical Diagnosis from Referral: S83.241A (ICD-10-CM) - Other tear of medial meniscus, current injury, right knee, initial encounter   Evaluation Date: 12/23/2021  Authorization Period Expiration: pending  Plan of Care Expiration: 3/19/22  Progress Note Due: 1/20/22  Visit # / Visits authorized: 4/12 (+3 previous referral)  FOTO: 1/3   PTA visit 0/5     Precautions: Standard and Post op   PROCEDURES(S) PERFORMED:  DOS: 12/21/21 POW 3 on 1/11/22  1. Right Arthroscopy, with meniscectomy (medial OR lateral) 84337, medial  2.  Right Arthroscopy, debridement/shaving of articular cartilage (chondroplasty) 31809  3.  Right Arthroscopy, knee, synovectomy, limited 97672     PHYSICAL THERAPY:  Weight bearing:as tolerated  right leg  Range of Motion:Full normal motion symmetric to opposite side     Immobilizer if present should be unlocked @ 120 degrees with gait and allowed to flex to 120 degrees at rest.     Time In: 830  Time Out: 915  Total Billable Time: 45 minutes    SUBJECTIVE     Pt reports: Doing very well. Able to walk around apartment complex to get mailbox, slightly sore following walking that distance  She was compliant with home exercise program.  Response to previous treatment: no adverse reactions  Functional change: able to ambulate short distances without cane    Pain: 1/10  Location: right knee      OBJECTIVE      Range of Motion:   Knee Left active Right Active   Flexion 115 120 AAROM   Extension +5 +5      TREATMENT     Kristin received therapeutic exercises to develop strength and ROM for 40 minutes including:    Recumbent Bike: 6 minutes, Level 3.0  Calf Str c/ incline board 3 x  "30"  HS Str at stairs 3 x 30"  TKE with pink cord 3x10  Standing calf raise 3x10  Shuttle BLE Press 2.5 cords 3x10  +Shuttle RLE Press 1-black 2x10  Sit to Stands 18"box + airex foam  2x10 7.5# kb  LAQ's: 3x10, 3" holds 2#    Heel Slides:  10x 10" holds  SLR's 3x10 1# RLE  DL Gluteal Bridges GTB 2x10, 3" holds  Sidelying Clamshells: 3x10, GTB  -next session: Step-up, Step-down, Balance    Not Performed Today:  Quad set wiith heel prop x 20 x 5 sec  Mini squats + compliant foam 2x10    Kristin received the following manual therapy techniques: Joint mobilizations were applied to the: R knee for 5 minutes, including:  Grade III/IV patellar mobilizations in all directions  Infrapatellar fat pad mobilizations   Instructions on self-mobilizations    PATIENT EDUCATION AND HOME EXERCISES     Home Exercises Provided and Patient Education Provided     Education provided:   - continue with HEP    Written Home Exercises Provided: yes. Exercises were reviewed and Kristin was able to demonstrate them prior to the end of the session.  Kristin demonstrated good  understanding of the education provided. See EMR under Patient Instructions for exercises provided during therapy sessions    ASSESSMENT     Kristin tolerated treatment session well, with no acute pain provocation during session. Presents ambulating without AD today, non-antalgic gait pattern with improved symmetry and stride length. Good SL strengthening performed on shuttle with good ability to maintain neutral knee positioning. Prepared to progress closed-chain strengthening and balance activities next session     Kristin Is progressing well towards her goals.   Pt prognosis is Good.     Pt will continue to benefit from skilled outpatient physical therapy to address the deficits listed in the problem list box on initial evaluation, provide pt/family education and to maximize pt's level of independence in the home and community environment.     Pt's spiritual, cultural and " educational needs considered and pt agreeable to plan of care and goals.     Anticipated Barriers for therapy: chronicity of knee pain     GOALS: Short Term Goals:  4 weeks   1.Report decreased in pain at worse less than  <   / =  6/10  to increase tolerance for functional mobility. in progress  2. Pt to improve R knee flexion range of motion to 90 degrees to allow for improved functional mobility to allow for improvement in IADLs.  in progress  3. Increased R knee MMT >/= 4/5 to increase tolerance for ADL and work activities. in progress  4. Pt to tolerate HEP to improve ROM and independence with ADL's in progress    Long Term Goals: 8 weeks in progress  1.Report decreased in pain at worse less than  <   / =  2/10  to increase tolerance for functional mobility.  2.Pt to improve R knee flexion to >/= 115 degrees to allow for improved functional mobility to allow for improvement in IADLs.   3.Increased R  knee MMT >/= 4+/5 to increase tolerance for ADL and work activities.  4. Pt will report at </=40 % limitation on FOTO  to demonstrate increase in LE function with every day tasks.   5. Pt to be Independent with HEP to improve ROM and independence with ADL's  6. Pt will demonstrate ability to ascend/descend 6 inch step using single handrail to increase safety during community ambulation.    PLAN     Continue with skilled PT towards POC.     Romulo Cheema, PT

## 2022-01-26 ENCOUNTER — CLINICAL SUPPORT (OUTPATIENT)
Dept: REHABILITATION | Facility: HOSPITAL | Age: 77
End: 2022-01-26
Payer: MEDICARE

## 2022-01-26 DIAGNOSIS — G89.18 POST-OPERATIVE PAIN: ICD-10-CM

## 2022-01-26 DIAGNOSIS — Z98.890 S/P MENISCECTOMY: ICD-10-CM

## 2022-01-26 PROCEDURE — 97110 THERAPEUTIC EXERCISES: CPT | Mod: PN

## 2022-01-26 NOTE — PROGRESS NOTES
"[facility]   Physical Therapy Treatment Note     Name: Kristin oGodman  Clinic Number: [id number]    Therapy Diagnosis:   Encounter Diagnoses   Name Primary?    S/P meniscectomy     Post-operative pain      Physician: Elly Sullivan MD  Visit Date: 1/26/2022    Physician Orders: PT Eval and Treat   Medical Diagnosis from Referral: S83.241A (ICD-10-CM) - Other tear of medial meniscus, current injury, right knee, initial encounter   Evaluation Date: 12/23/2021  Authorization Period Expiration: pending  Plan of Care Expiration: 3/19/22  Progress Note Due: 1/20/22  Visit # / Visits authorized: 4/12 (+3 previous referral)  FOTO: 1/3   PTA visit 0/5     Precautions: Standard and Post op   PROCEDURES(S) PERFORMED:  DOS: 12/21/21 POW 3 on 1/11/22  1. Right Arthroscopy, with meniscectomy (medial OR lateral) 54190, medial  2.  Right Arthroscopy, debridement/shaving of articular cartilage (chondroplasty) 73957  3.  Right Arthroscopy, knee, synovectomy, limited 53424     PHYSICAL THERAPY:  Weight bearing:as tolerated  right leg  Range of Motion:Full normal motion symmetric to opposite side     Immobilizer if present should be unlocked @ 120 degrees with gait and allowed to flex to 120 degrees at rest.     Time In: 0905  Time Out: 1000  Total Billable Time: 55 minutes    SUBJECTIVE     Pt reports: Doing very well. Able to walk around apartment complex to get mailbox, slightly sore following walking that distance  She was compliant with home exercise program.  Response to previous treatment: no adverse reactions  Functional change: able to ambulate short distances without cane    Pain: 1/10  Location: right knee      OBJECTIVE        TREATMENT     Kristin received therapeutic exercises to develop strength and ROM for 40 minutes including:    Recumbent Bike: 8 minutes, Level 3.0  Calf Str c/ incline board 3 x 30"  HS Str at stairs 3 x 30"  TKE with pink cord 3x10  Standing calf raise 3x10  Shuttle BLE " "Press 2.5 cords 3x10  +Shuttle RLE Press 1-black 2x10  Sit to Stands 18"box + airex foam  2x10 7.5# kb  LAQ's: 3x10, 3" holds 2#  Heel Slides:  10x 10" holds  SLR's 3x10 2# RLE  DL Gluteal Bridges GTB 2x10, 3" holds  Sidelying Clamshells: 3x10, GTB  Tandem balance on airex with HT    -next session: Step-up, Step-down, Balance    Not Performed Today:  Quad set wiith heel prop x 20 x 5 sec  Mini squats + compliant foam 2x10    Kristin received the following manual therapy techniques: Joint mobilizations were applied to the: R knee for 5 minutes, including:  Grade III/IV patellar mobilizations in all directions  Infrapatellar fat pad mobilizations   Instructions on self-mobilizations    PATIENT EDUCATION AND HOME EXERCISES     Home Exercises Provided and Patient Education Provided     Education provided:   - continue with HEP    Written Home Exercises Provided: yes. Exercises were reviewed and Kristin was able to demonstrate them prior to the end of the session.  Kristin demonstrated good  understanding of the education provided. See EMR under Patient Instructions for exercises provided during therapy sessions    ASSESSMENT     Pt ambulates today with a guarded, unsteady gait, decreased step length and velocity. Requires min cueing to increase WB through involved right LE with sit to stands. No c/o increased discomfort with prescribed activities. Good response to exercise progression. Kristin Is progressing well towards her goals.     Pt prognosis is Good.     Pt will continue to benefit from skilled outpatient physical therapy to address the deficits listed in the problem list box on initial evaluation, provide pt/family education and to maximize pt's level of independence in the home and community environment.     Pt's spiritual, cultural and educational needs considered and pt agreeable to plan of care and goals.     Anticipated Barriers for therapy: chronicity of knee pain     GOALS: Short Term Goals:  4 weeks   1.Report " decreased in pain at worse less than  <   / =  6/10  to increase tolerance for functional mobility. in progress  2. Pt to improve R knee flexion range of motion to 90 degrees to allow for improved functional mobility to allow for improvement in IADLs.  in progress  3. Increased R knee MMT >/= 4/5 to increase tolerance for ADL and work activities. in progress  4. Pt to tolerate HEP to improve ROM and independence with ADL's in progress    Long Term Goals: 8 weeks in progress  1.Report decreased in pain at worse less than  <   / =  2/10  to increase tolerance for functional mobility.  2.Pt to improve R knee flexion to >/= 115 degrees to allow for improved functional mobility to allow for improvement in IADLs.   3.Increased R  knee MMT >/= 4+/5 to increase tolerance for ADL and work activities.  4. Pt will report at </=40 % limitation on FOTO  to demonstrate increase in LE function with every day tasks.   5. Pt to be Independent with HEP to improve ROM and independence with ADL's  6. Pt will demonstrate ability to ascend/descend 6 inch step using single handrail to increase safety during community ambulation.    PLAN     Continue with skilled PT towards POC.     Waqar Reyes, PT

## 2022-01-31 ENCOUNTER — OFFICE VISIT (OUTPATIENT)
Dept: SPORTS MEDICINE | Facility: CLINIC | Age: 77
End: 2022-01-31
Payer: MEDICARE

## 2022-01-31 ENCOUNTER — HOSPITAL ENCOUNTER (OUTPATIENT)
Dept: RADIOLOGY | Facility: HOSPITAL | Age: 77
Discharge: HOME | End: 2022-01-31
Attending: ORTHOPAEDIC SURGERY
Payer: MEDICARE

## 2022-01-31 VITALS
SYSTOLIC BLOOD PRESSURE: 158 MMHG | WEIGHT: 165.31 LBS | BODY MASS INDEX: 31.21 KG/M2 | DIASTOLIC BLOOD PRESSURE: 82 MMHG | HEIGHT: 61 IN | HEART RATE: 66 BPM

## 2022-01-31 DIAGNOSIS — G89.29 CHRONIC PAIN OF RIGHT KNEE: Primary | ICD-10-CM

## 2022-01-31 DIAGNOSIS — M25.561 CHRONIC PAIN OF RIGHT KNEE: Primary | ICD-10-CM

## 2022-01-31 DIAGNOSIS — M25.561 CHRONIC PAIN OF RIGHT KNEE: ICD-10-CM

## 2022-01-31 DIAGNOSIS — S83.241D ACUTE MEDIAL MENISCUS TEAR OF RIGHT KNEE, SUBSEQUENT ENCOUNTER: ICD-10-CM

## 2022-01-31 DIAGNOSIS — G89.29 CHRONIC PAIN OF RIGHT KNEE: ICD-10-CM

## 2022-01-31 PROCEDURE — 3008F BODY MASS INDEX DOCD: CPT | Mod: CPTII,S$GLB,, | Performed by: ORTHOPAEDIC SURGERY

## 2022-01-31 PROCEDURE — 73564 X-RAY EXAM KNEE 4 OR MORE: CPT | Mod: 26,50,, | Performed by: RADIOLOGY

## 2022-01-31 PROCEDURE — 1160F PR REVIEW ALL MEDS BY PRESCRIBER/CLIN PHARMACIST DOCUMENTED: ICD-10-PCS | Mod: CPTII,S$GLB,, | Performed by: ORTHOPAEDIC SURGERY

## 2022-01-31 PROCEDURE — 1125F PR PAIN SEVERITY QUANTIFIED, PAIN PRESENT: ICD-10-PCS | Mod: CPTII,S$GLB,, | Performed by: ORTHOPAEDIC SURGERY

## 2022-01-31 PROCEDURE — 99999 PR PBB SHADOW E&M-EST. PATIENT-LVL V: ICD-10-PCS | Mod: PBBFAC,,, | Performed by: ORTHOPAEDIC SURGERY

## 2022-01-31 PROCEDURE — 3079F PR MOST RECENT DIASTOLIC BLOOD PRESSURE 80-89 MM HG: ICD-10-PCS | Mod: CPTII,S$GLB,, | Performed by: ORTHOPAEDIC SURGERY

## 2022-01-31 PROCEDURE — 3008F PR BODY MASS INDEX (BMI) DOCUMENTED: ICD-10-PCS | Mod: CPTII,S$GLB,, | Performed by: ORTHOPAEDIC SURGERY

## 2022-01-31 PROCEDURE — 99999 PR PBB SHADOW E&M-EST. PATIENT-LVL V: CPT | Mod: PBBFAC,,, | Performed by: ORTHOPAEDIC SURGERY

## 2022-01-31 PROCEDURE — 3288F FALL RISK ASSESSMENT DOCD: CPT | Mod: CPTII,S$GLB,, | Performed by: ORTHOPAEDIC SURGERY

## 2022-01-31 PROCEDURE — 99024 PR POST-OP FOLLOW-UP VISIT: ICD-10-PCS | Mod: S$GLB,,, | Performed by: ORTHOPAEDIC SURGERY

## 2022-01-31 PROCEDURE — 3077F SYST BP >= 140 MM HG: CPT | Mod: CPTII,S$GLB,, | Performed by: ORTHOPAEDIC SURGERY

## 2022-01-31 PROCEDURE — 1101F PT FALLS ASSESS-DOCD LE1/YR: CPT | Mod: CPTII,S$GLB,, | Performed by: ORTHOPAEDIC SURGERY

## 2022-01-31 PROCEDURE — 1159F PR MEDICATION LIST DOCUMENTED IN MEDICAL RECORD: ICD-10-PCS | Mod: CPTII,S$GLB,, | Performed by: ORTHOPAEDIC SURGERY

## 2022-01-31 PROCEDURE — 73564 X-RAY EXAM KNEE 4 OR MORE: CPT | Mod: TC,50

## 2022-01-31 PROCEDURE — 99024 POSTOP FOLLOW-UP VISIT: CPT | Mod: S$GLB,,, | Performed by: ORTHOPAEDIC SURGERY

## 2022-01-31 PROCEDURE — 3077F PR MOST RECENT SYSTOLIC BLOOD PRESSURE >= 140 MM HG: ICD-10-PCS | Mod: CPTII,S$GLB,, | Performed by: ORTHOPAEDIC SURGERY

## 2022-01-31 PROCEDURE — 1159F MED LIST DOCD IN RCRD: CPT | Mod: CPTII,S$GLB,, | Performed by: ORTHOPAEDIC SURGERY

## 2022-01-31 PROCEDURE — 3079F DIAST BP 80-89 MM HG: CPT | Mod: CPTII,S$GLB,, | Performed by: ORTHOPAEDIC SURGERY

## 2022-01-31 PROCEDURE — 1101F PR PT FALLS ASSESS DOC 0-1 FALLS W/OUT INJ PAST YR: ICD-10-PCS | Mod: CPTII,S$GLB,, | Performed by: ORTHOPAEDIC SURGERY

## 2022-01-31 PROCEDURE — 3288F PR FALLS RISK ASSESSMENT DOCUMENTED: ICD-10-PCS | Mod: CPTII,S$GLB,, | Performed by: ORTHOPAEDIC SURGERY

## 2022-01-31 PROCEDURE — 1160F RVW MEDS BY RX/DR IN RCRD: CPT | Mod: CPTII,S$GLB,, | Performed by: ORTHOPAEDIC SURGERY

## 2022-01-31 PROCEDURE — 73564 XR KNEE ORTHO BILAT WITH FLEXION: ICD-10-PCS | Mod: 26,50,, | Performed by: RADIOLOGY

## 2022-01-31 PROCEDURE — 1125F AMNT PAIN NOTED PAIN PRSNT: CPT | Mod: CPTII,S$GLB,, | Performed by: ORTHOPAEDIC SURGERY

## 2022-01-31 NOTE — PROGRESS NOTES
Subjective:          Chief Complaint: Kristin Goodman is a 76 y.o. female who had concerns including Post-op Evaluation of the Right Knee.    Mrs. Kristin Goodman is here 6 weeks s/p the below procedures. She is in physical therapy at [facility]. She continues to take Tylenol for pain. She is overall doing well.     History of present illness: Pt is here today for  2 weeks post-operative followup of knee arthroscopy.  she is doing well.  We have reviewed her findings and discussed plan of care and future treatment options.      She is doing well.   Pain is improving. Only taking occasional robaxin and tylenol or pain.   She still uses a cane to ambulate outside of her home and no longer uses it inside her home.   She is Full weight bearing.   Knee is feeling improved.     DATE OF PROCEDURE: 12/21/2021     ATTENDING SURGEON: Surgeon(s) and Role:     * Elly Sullivan MD - Primary  PROCEDURES(S) PERFORMED:   1. Right  Arthroscopy, with meniscectomy (medial OR lateral) 95178, medial  2.  Right  Arthroscopy, debridement/shaving of articular cartilage (chondroplasty) 62722  3.  Right  Arthroscopy, knee, synovectomy, limited 78227        Review of Systems   Constitutional: Negative for chills and fever.   HENT: Negative for congestion and sore throat.    Eyes: Negative for discharge and double vision.   Cardiovascular: Negative for chest pain, palpitations and syncope.   Respiratory: Negative for cough and shortness of breath.    Endocrine: Negative for cold intolerance and heat intolerance.   Skin: Negative for dry skin and rash.   Musculoskeletal: Positive for joint pain.   Gastrointestinal: Negative for abdominal pain, nausea and vomiting.   Neurological: Negative for focal weakness, numbness and paresthesias.       Pain Related Questions  Over the past 3 days, what was your average pain during activity? (I.e. running, jogging, walking, climbing stairs, getting dressed, ect.): 3  Over the past 3 days, what was your  highest pain level?: 3  Over the past 3 days, what was your lowest pain level? : 1    Other  How many nights a week are you awakened by your affected body part?: 1  Was the patient's HEIGHT measured or patient reported?: Patient Reported  Was the patient's WEIGHT measured or patient reported?: Measured      Objective:        General: Kristin is well-developed, well-nourished, appears stated age, in no acute distress, alert and oriented to time, place and person.     General    Vitals reviewed.  Constitutional: She is oriented to person, place, and time. She appears well-developed and well-nourished. No distress.   HENT:   Mouth/Throat: No oropharyngeal exudate.   Eyes: Right eye exhibits no discharge. Left eye exhibits no discharge.   Pulmonary/Chest: Effort normal and breath sounds normal. No respiratory distress.   Neurological: She is alert and oriented to person, place, and time. She has normal reflexes. No cranial nerve deficit. Coordination normal.   Psychiatric: She has a normal mood and affect. Her behavior is normal. Judgment and thought content normal.     General Musculoskeletal Exam   Gait: normal       Right Knee Exam     Inspection   Erythema: absent  Scars: absent  Swelling: absent  Effusion: absent  Deformity: absent  Bruising: absent    Tenderness   The patient is experiencing no tenderness.     Range of Motion   Extension: 0   Flexion: 140     Tests   Meniscus   Chanda:  Medial - negative Lateral - negative  Ligament Examination Lachman: normal (-1 to 2mm) PCL-Posterior Drawer: normal (0 to 2mm)     MCL - Valgus: normal (0 to 2mm)  LCL - Varus: normalPivot Shift: normal (Equal)Reverse Pivot Shift: normal (Equal)Dial Test at 30 degrees: normal (< 5 degrees)Dial Test at 90 degrees: normal (< 5 degrees)  Posterior Sag Test: negative  Posterolateral Corner: unstable (>15 degrees difference)  Patella   Patellar apprehension: negative  Passive Patellar Tilt: neutral  Patellar Tracking: normal  Patellar  Glide (quadrants): Lateral - 1   Medial - 2  Q-Angle at 90 degrees: normal  Patellar Grind: negative  J-Sign: none    Other   Meniscal Cyst: absent  Popliteal (Baker's) Cyst: absent  Sensation: normal    Left Knee Exam     Inspection   Erythema: absent  Scars: absent  Swelling: absent  Effusion: absent  Deformity: absent  Bruising: absent    Tenderness   The patient is experiencing no tenderness.     Range of Motion   Extension: 0   Flexion: 140     Tests   Meniscus   Chanda:  Medial - negative Lateral - negative  Stability Lachman: normal (-1 to 2mm) PCL-Posterior Drawer: normal (0 to 2mm)  MCL - Valgus: normal (0 to 2mm)  LCL - Varus: normal (0 to 2mm)Pivot Shift: normal (Equal)Reverse Pivot Shift: normal (Equal)Dial Test at 30 degrees: normal (< 5 degrees)Dial Test at 90 degrees: normal (< 5 degrees)  Posterior Sag Test: negative  Posterolateral Corner: unstable (>15 degrees difference)  Patella   Patellar apprehension: negative  Passive Patellar Tilt: neutral  Patellar Tracking: normal  Patellar Glide (Quadrants): Lateral - 1 Medial - 2  Q-Angle at 90 degrees: normal  Patellar Grind: negative  J-Sign: J sign absent    Other   Meniscal Cyst: absent  Popliteal (Baker's) Cyst: absent  Sensation: normal    Right Hip Exam     Tests   Lissette: negative  Left Hip Exam     Tests   Lissette: negative          Reflexes     Left Side  Achilles:  2+  Quadriceps:  2+    Right Side   Achilles:  2+  Quadriceps:  2+    Vascular Exam     Right Pulses  Dorsalis Pedis:      2+  Posterior Tibial:      2+        Left Pulses  Dorsalis Pedis:      2+  Posterior Tibial:      2+                    Assessment:       Encounter Diagnoses   Name Primary?    Chronic pain of right knee Yes    Acute medial meniscus tear of right knee, subsequent encounter           Plan:       1. IKDC, SF-12 and KOOS was filled out today in clinic.     RTC PRN with Dr. Elly Sullivan Patient will fill out IKDC, SF-12 and KOOS on return.    2. Medications: Refills of  the following Rx were sent to patients preferred Pharmacy:  No Refills Needed Today    3. Physical Therapy: Continue/Begin: Continue at [facility]    4. HEP: N/A    5. Procedures/Procedural Planning:   N/A    6. DME: N/A    7. 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     8. Visit Summary: Patient is overall doing very well. I am pleased with her progress up until this point. We will see her back in PRN.                          [facility] patient questionnaires have been collected today.

## 2022-01-31 NOTE — LETTER
Patient: Kristin Goodman   YOB: 1945   Clinic Number: [id number]   Today's Date: January 31, 2022        Certificate to Return to Work     Kristin Lenz was seen by Elly Sullivan MD on 1/31/2022.    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     Specific restrictions: no crawling or getting down on floor     If you have any questions or concerns, please feel free to contact the office at [phone].    Thank you.    Elly Sullivan MD        Signature: __________________________________________________

## 2022-01-31 NOTE — PATIENT INSTRUCTIONS
DESCRIPTION  Synvisc-One (hylan G-F 20) is an elastoviscous high molecular weight fluid containing hylan A and hylan B polymers produced from chicken isabel. Hylans are derivatives of hyaluronan (sodium hyaluronate). Hylan G-F 20 is unique in that the hyaluronan is chemically cross linked. Hyaluronan is a long-chain polymer containing repeating disaccharide units of Kn-qhaettgmgvn-N-acetylglucosamine.     INDICATIONS FOR USE  Synvisc-One is indicated for the treatment of pain in osteoarthritis (OA) of the knee in patients who have failed to respond adequately to conservative nonpharmacologic therapy and simple analgesics, e.g., acetaminophen.     Who is a candidate for Synvisc-One  Patients with knee osteoarthritis, who have tried diet, exercise and over-the-counter pain medication but still have pain, should talk to their doctor to see if Synvisc-One is right for them.     How Synvisc-One is administered  Synvisc-One is a single injection. It's a simple, in-office procedure that only takes a few minutes.     What you can expect following a Synvisc-One knee injection Synvisc-One can provide up to six months of osteoarthritis knee pain relief. Everyone responds differently, but in a medical study* patients experienced relief starting one month after the injection.     After the injection, you can resume normal day-to-day activities, but you should avoid any strenuous activities for about 48 hours.     Contraindication  Do not administer to patients with known hypersensitivity (allergy) to hyaluronan (sodium hyaluronate) preparations.   Do not inject Synvisc-One in the knees of patients having knee joint infections or skin diseases or infections in the area of theinjection site.    What are possible side effects?  Synvisc may occur short-term pain, swelling at the injection site and / or the appearance of synovial exudate after injection. In some cases, exudation may be significant and cause more prolonged pain.      Important Safety Information  Before trying Synvisc-One or SYNVISC, tell your doctor if you are allergic to products from birds - such as feathers, eggs or poultry - or if your leg is swollen or infected. Synvisc-One and SYNVISC are only for injection into the knee, performed by a doctor or other qualified health care professional. Synvisc-One and SYNVISC have not been tested to show pain relief in joints other than the knee. Talk to your doctor before resuming strenuous weight-bearing activities after treatment. Synvisc-One and SYNVISC have not been tested in children, pregnant women or women who are nursing. You should tell your doctor if you think you are pregnant or if you are nursing a child. The side effects most commonly seen when Synvisc-One or SYNVISC is injected into the knee were pain, swelling and/or fluid buildup in or around the knee. Cases where the swelling is extensive or painful should be discussed with your doctor. Allergic reactions such as rash and hives have been reported rarely.

## 2022-02-07 ENCOUNTER — OFFICE VISIT (OUTPATIENT)
Dept: FAMILY MEDICINE | Facility: CLINIC | Age: 77
End: 2022-02-07
Payer: MEDICARE

## 2022-02-07 VITALS
HEIGHT: 61 IN | BODY MASS INDEX: 30.85 KG/M2 | DIASTOLIC BLOOD PRESSURE: 80 MMHG | SYSTOLIC BLOOD PRESSURE: 120 MMHG | WEIGHT: 163.38 LBS | OXYGEN SATURATION: 97 % | TEMPERATURE: 98 F | RESPIRATION RATE: 17 BRPM | HEART RATE: 90 BPM

## 2022-02-07 DIAGNOSIS — R06.09 DOE (DYSPNEA ON EXERTION): Primary | ICD-10-CM

## 2022-02-07 DIAGNOSIS — J30.9 ALLERGIC RHINITIS, UNSPECIFIED SEASONALITY, UNSPECIFIED TRIGGER: ICD-10-CM

## 2022-02-07 PROCEDURE — 1101F PT FALLS ASSESS-DOCD LE1/YR: CPT | Mod: CPTII,S$GLB,, | Performed by: PHYSICIAN ASSISTANT

## 2022-02-07 PROCEDURE — 3079F PR MOST RECENT DIASTOLIC BLOOD PRESSURE 80-89 MM HG: ICD-10-PCS | Mod: CPTII,S$GLB,, | Performed by: PHYSICIAN ASSISTANT

## 2022-02-07 PROCEDURE — 1126F AMNT PAIN NOTED NONE PRSNT: CPT | Mod: CPTII,S$GLB,, | Performed by: PHYSICIAN ASSISTANT

## 2022-02-07 PROCEDURE — 3288F FALL RISK ASSESSMENT DOCD: CPT | Mod: CPTII,S$GLB,, | Performed by: PHYSICIAN ASSISTANT

## 2022-02-07 PROCEDURE — 3074F SYST BP LT 130 MM HG: CPT | Mod: CPTII,S$GLB,, | Performed by: PHYSICIAN ASSISTANT

## 2022-02-07 PROCEDURE — 99999 PR PBB SHADOW E&M-EST. PATIENT-LVL V: ICD-10-PCS | Mod: PBBFAC,,, | Performed by: PHYSICIAN ASSISTANT

## 2022-02-07 PROCEDURE — 99214 OFFICE O/P EST MOD 30 MIN: CPT | Mod: S$GLB,,, | Performed by: PHYSICIAN ASSISTANT

## 2022-02-07 PROCEDURE — 3008F PR BODY MASS INDEX (BMI) DOCUMENTED: ICD-10-PCS | Mod: CPTII,S$GLB,, | Performed by: PHYSICIAN ASSISTANT

## 2022-02-07 PROCEDURE — 3288F PR FALLS RISK ASSESSMENT DOCUMENTED: ICD-10-PCS | Mod: CPTII,S$GLB,, | Performed by: PHYSICIAN ASSISTANT

## 2022-02-07 PROCEDURE — 1126F PR PAIN SEVERITY QUANTIFIED, NO PAIN PRESENT: ICD-10-PCS | Mod: CPTII,S$GLB,, | Performed by: PHYSICIAN ASSISTANT

## 2022-02-07 PROCEDURE — 99214 PR OFFICE/OUTPT VISIT, EST, LEVL IV, 30-39 MIN: ICD-10-PCS | Mod: S$GLB,,, | Performed by: PHYSICIAN ASSISTANT

## 2022-02-07 PROCEDURE — 1160F PR REVIEW ALL MEDS BY PRESCRIBER/CLIN PHARMACIST DOCUMENTED: ICD-10-PCS | Mod: CPTII,S$GLB,, | Performed by: PHYSICIAN ASSISTANT

## 2022-02-07 PROCEDURE — 3079F DIAST BP 80-89 MM HG: CPT | Mod: CPTII,S$GLB,, | Performed by: PHYSICIAN ASSISTANT

## 2022-02-07 PROCEDURE — 99999 PR PBB SHADOW E&M-EST. PATIENT-LVL V: CPT | Mod: PBBFAC,,, | Performed by: PHYSICIAN ASSISTANT

## 2022-02-07 PROCEDURE — 3074F PR MOST RECENT SYSTOLIC BLOOD PRESSURE < 130 MM HG: ICD-10-PCS | Mod: CPTII,S$GLB,, | Performed by: PHYSICIAN ASSISTANT

## 2022-02-07 PROCEDURE — 1159F MED LIST DOCD IN RCRD: CPT | Mod: CPTII,S$GLB,, | Performed by: PHYSICIAN ASSISTANT

## 2022-02-07 PROCEDURE — 3008F BODY MASS INDEX DOCD: CPT | Mod: CPTII,S$GLB,, | Performed by: PHYSICIAN ASSISTANT

## 2022-02-07 PROCEDURE — 1159F PR MEDICATION LIST DOCUMENTED IN MEDICAL RECORD: ICD-10-PCS | Mod: CPTII,S$GLB,, | Performed by: PHYSICIAN ASSISTANT

## 2022-02-07 PROCEDURE — 1160F RVW MEDS BY RX/DR IN RCRD: CPT | Mod: CPTII,S$GLB,, | Performed by: PHYSICIAN ASSISTANT

## 2022-02-07 PROCEDURE — 1101F PR PT FALLS ASSESS DOC 0-1 FALLS W/OUT INJ PAST YR: ICD-10-PCS | Mod: CPTII,S$GLB,, | Performed by: PHYSICIAN ASSISTANT

## 2022-02-07 RX ORDER — ALBUTEROL SULFATE 90 UG/1
2 AEROSOL, METERED RESPIRATORY (INHALATION) EVERY 6 HOURS PRN
Qty: 18 G | Refills: 0 | OUTPATIENT
Start: 2022-02-07 | End: 2022-09-24

## 2022-02-07 RX ORDER — LEVOCETIRIZINE DIHYDROCHLORIDE 5 MG/1
5 TABLET, FILM COATED ORAL NIGHTLY
Qty: 30 TABLET | Refills: 0 | Status: SHIPPED | OUTPATIENT
Start: 2022-02-07 | End: 2023-02-19 | Stop reason: SDUPTHER

## 2022-02-07 RX ORDER — FLUTICASONE PROPIONATE 50 MCG
1 SPRAY, SUSPENSION (ML) NASAL 2 TIMES DAILY
Qty: 16 G | Refills: 0 | Status: SHIPPED | OUTPATIENT
Start: 2022-02-07 | End: 2023-02-19 | Stop reason: SDUPTHER

## 2022-02-08 ENCOUNTER — TELEPHONE (OUTPATIENT)
Dept: FAMILY MEDICINE | Facility: CLINIC | Age: 77
End: 2022-02-08
Payer: MEDICARE

## 2022-02-08 DIAGNOSIS — Z01.812 PRE-PROCEDURE LAB EXAM: Primary | ICD-10-CM

## 2022-02-08 NOTE — TELEPHONE ENCOUNTER
----- Message from Melinda Perez sent at 2/8/2022  8:46 AM CST -----  Regarding: PFT   Good Day,      Per Respiratory patients will need a covid test before having the PFT.       Please have provider add orders to epic   Thank you .

## 2022-02-14 ENCOUNTER — LAB VISIT (OUTPATIENT)
Dept: FAMILY MEDICINE | Facility: CLINIC | Age: 77
End: 2022-02-14
Payer: MEDICARE

## 2022-02-14 DIAGNOSIS — Z01.812 PRE-PROCEDURE LAB EXAM: ICD-10-CM

## 2022-02-14 PROCEDURE — U0005 INFEC AGEN DETEC AMPLI PROBE: HCPCS | Performed by: PHYSICIAN ASSISTANT

## 2022-02-14 PROCEDURE — U0003 INFECTIOUS AGENT DETECTION BY NUCLEIC ACID (DNA OR RNA); SEVERE ACUTE RESPIRATORY SYNDROME CORONAVIRUS 2 (SARS-COV-2) (CORONAVIRUS DISEASE [COVID-19]), AMPLIFIED PROBE TECHNIQUE, MAKING USE OF HIGH THROUGHPUT TECHNOLOGIES AS DESCRIBED BY CMS-2020-01-R: HCPCS | Performed by: PHYSICIAN ASSISTANT

## 2022-02-15 LAB
SARS-COV-2 RNA RESP QL NAA+PROBE: NOT DETECTED
SARS-COV-2- CYCLE NUMBER: NORMAL

## 2022-02-17 ENCOUNTER — HOSPITAL ENCOUNTER (OUTPATIENT)
Dept: RESPIRATORY THERAPY | Facility: HOSPITAL | Age: 77
Discharge: HOME | End: 2022-02-17
Attending: PHYSICIAN ASSISTANT
Payer: MEDICAID

## 2022-02-17 DIAGNOSIS — R06.09 DOE (DYSPNEA ON EXERTION): ICD-10-CM

## 2022-02-24 ENCOUNTER — HOSPITAL ENCOUNTER (OUTPATIENT)
Dept: RESPIRATORY THERAPY | Facility: HOSPITAL | Age: 77
Discharge: HOME | End: 2022-02-24
Attending: PHYSICIAN ASSISTANT
Payer: MEDICARE

## 2022-02-24 VITALS — OXYGEN SATURATION: 99 % | RESPIRATION RATE: 18 BRPM | HEART RATE: 74 BPM

## 2022-02-24 PROCEDURE — 94010 BREATHING CAPACITY TEST: CPT

## 2022-02-24 PROCEDURE — 25000242 PHARM REV CODE 250 ALT 637 W/ HCPCS: Performed by: PHYSICIAN ASSISTANT

## 2022-02-24 PROCEDURE — 94060 EVALUATION OF WHEEZING: CPT | Mod: 26,,, | Performed by: INTERNAL MEDICINE

## 2022-02-24 PROCEDURE — 94727 GAS DIL/WSHOT DETER LNG VOL: CPT

## 2022-02-24 PROCEDURE — 94729 PR C02/MEMBANE DIFFUSE CAPACITY: ICD-10-PCS | Mod: 26,,, | Performed by: INTERNAL MEDICINE

## 2022-02-24 PROCEDURE — 94060 PR EVAL OF BRONCHOSPASM: ICD-10-PCS | Mod: 26,,, | Performed by: INTERNAL MEDICINE

## 2022-02-24 PROCEDURE — 94727 GAS DIL/WSHOT DETER LNG VOL: CPT | Mod: 26,,, | Performed by: INTERNAL MEDICINE

## 2022-02-24 PROCEDURE — 94727 PR PULM FUNCTION TEST BY GAS: ICD-10-PCS | Mod: 26,,, | Performed by: INTERNAL MEDICINE

## 2022-02-24 PROCEDURE — 94729 DIFFUSING CAPACITY: CPT | Mod: 26,,, | Performed by: INTERNAL MEDICINE

## 2022-02-24 PROCEDURE — 94729 DIFFUSING CAPACITY: CPT

## 2022-02-24 RX ORDER — ALBUTEROL SULFATE 2.5 MG/.5ML
2.5 SOLUTION RESPIRATORY (INHALATION) ONCE
Status: COMPLETED | OUTPATIENT
Start: 2022-02-24 | End: 2022-02-24

## 2022-02-24 RX ADMIN — ALBUTEROL SULFATE 2.5 MG: 2.5 SOLUTION RESPIRATORY (INHALATION) at 12:02

## 2022-02-25 NOTE — TELEPHONE ENCOUNTER
----- Message from Daphney Villalobos sent at 2/25/2019  7:38 AM CST -----  Contact: self  Pt returning call. [phone]   Called patient to reschedule appointment due to providers request. Appointment rescheduled, patient ok with new time and date.

## 2022-03-28 PROBLEM — G89.18 POST-OPERATIVE PAIN: Status: RESOLVED | Noted: 2021-12-23 | Resolved: 2022-03-28

## 2022-04-01 ENCOUNTER — TELEPHONE (OUTPATIENT)
Dept: RHEUMATOLOGY | Facility: CLINIC | Age: 77
End: 2022-04-01
Payer: MEDICARE

## 2022-04-01 NOTE — TELEPHONE ENCOUNTER
Spoke with the patient and let her know that I had to cancel her appointment with Dr Puga and rescheduled her with Dr Palacio

## 2022-04-05 ENCOUNTER — PATIENT OUTREACH (OUTPATIENT)
Dept: ADMINISTRATIVE | Facility: OTHER | Age: 77
End: 2022-04-05
Payer: MEDICARE

## 2022-04-05 NOTE — PROGRESS NOTES
Health Maintenance Due   Topic Date Due    Shingles Vaccine (1 of 2) Never done     Updates were requested from care everywhere.  Chart was reviewed for overdue Proactive [facility] Encounters ([facility]) topics (CRS, Breast Cancer Screening, Eye exam)  Health Maintenance has been updated.  [facility] immunization registry triggered.  Immunizations were reconciled.

## 2022-04-06 ENCOUNTER — HOSPITAL ENCOUNTER (OUTPATIENT)
Dept: RADIOLOGY | Facility: HOSPITAL | Age: 77
Discharge: HOME | End: 2022-04-06
Attending: INTERNAL MEDICINE
Payer: MEDICARE

## 2022-04-06 ENCOUNTER — OFFICE VISIT (OUTPATIENT)
Dept: RHEUMATOLOGY | Facility: CLINIC | Age: 77
End: 2022-04-06
Payer: MEDICARE

## 2022-04-06 VITALS
SYSTOLIC BLOOD PRESSURE: 176 MMHG | DIASTOLIC BLOOD PRESSURE: 87 MMHG | WEIGHT: 165.13 LBS | HEART RATE: 68 BPM | BODY MASS INDEX: 31.18 KG/M2 | HEIGHT: 61 IN

## 2022-04-06 DIAGNOSIS — M77.8 CAPSULITIS OF LEFT SHOULDER: ICD-10-CM

## 2022-04-06 DIAGNOSIS — S83.241A ACUTE MEDIAL MENISCAL TEAR, RIGHT, INITIAL ENCOUNTER: ICD-10-CM

## 2022-04-06 DIAGNOSIS — M25.512 ACUTE PAIN OF LEFT SHOULDER: Primary | ICD-10-CM

## 2022-04-06 DIAGNOSIS — M19.049 HAND ARTHRITIS: ICD-10-CM

## 2022-04-06 DIAGNOSIS — R63.4 ABNORMAL WEIGHT LOSS: ICD-10-CM

## 2022-04-06 DIAGNOSIS — M25.512 ACUTE PAIN OF LEFT SHOULDER: ICD-10-CM

## 2022-04-06 PROCEDURE — 73130 XR HAND COMPLETE 3 VIEWS BILATERAL: ICD-10-PCS | Mod: 26,50,, | Performed by: RADIOLOGY

## 2022-04-06 PROCEDURE — 3008F PR BODY MASS INDEX (BMI) DOCUMENTED: ICD-10-PCS | Mod: CPTII,S$GLB,, | Performed by: INTERNAL MEDICINE

## 2022-04-06 PROCEDURE — 1125F AMNT PAIN NOTED PAIN PRSNT: CPT | Mod: CPTII,S$GLB,, | Performed by: INTERNAL MEDICINE

## 2022-04-06 PROCEDURE — 20610 DRAIN/INJ JOINT/BURSA W/O US: CPT | Mod: LT,S$GLB,, | Performed by: INTERNAL MEDICINE

## 2022-04-06 PROCEDURE — 1159F MED LIST DOCD IN RCRD: CPT | Mod: CPTII,S$GLB,, | Performed by: INTERNAL MEDICINE

## 2022-04-06 PROCEDURE — 1160F PR REVIEW ALL MEDS BY PRESCRIBER/CLIN PHARMACIST DOCUMENTED: ICD-10-PCS | Mod: CPTII,S$GLB,, | Performed by: INTERNAL MEDICINE

## 2022-04-06 PROCEDURE — 99999 PR PBB SHADOW E&M-EST. PATIENT-LVL V: ICD-10-PCS | Mod: PBBFAC,,, | Performed by: INTERNAL MEDICINE

## 2022-04-06 PROCEDURE — 73130 X-RAY EXAM OF HAND: CPT | Mod: TC,50

## 2022-04-06 PROCEDURE — 73030 XR SHOULDER COMPLETE 2 OR MORE VIEWS LEFT: ICD-10-PCS | Mod: 26,LT,, | Performed by: RADIOLOGY

## 2022-04-06 PROCEDURE — 99999 PR PBB SHADOW E&M-EST. PATIENT-LVL V: CPT | Mod: PBBFAC,,, | Performed by: INTERNAL MEDICINE

## 2022-04-06 PROCEDURE — 73030 X-RAY EXAM OF SHOULDER: CPT | Mod: TC,LT

## 2022-04-06 PROCEDURE — 3077F SYST BP >= 140 MM HG: CPT | Mod: CPTII,S$GLB,, | Performed by: INTERNAL MEDICINE

## 2022-04-06 PROCEDURE — 1159F PR MEDICATION LIST DOCUMENTED IN MEDICAL RECORD: ICD-10-PCS | Mod: CPTII,S$GLB,, | Performed by: INTERNAL MEDICINE

## 2022-04-06 PROCEDURE — 3077F PR MOST RECENT SYSTOLIC BLOOD PRESSURE >= 140 MM HG: ICD-10-PCS | Mod: CPTII,S$GLB,, | Performed by: INTERNAL MEDICINE

## 2022-04-06 PROCEDURE — 1160F RVW MEDS BY RX/DR IN RCRD: CPT | Mod: CPTII,S$GLB,, | Performed by: INTERNAL MEDICINE

## 2022-04-06 PROCEDURE — 73130 X-RAY EXAM OF HAND: CPT | Mod: 26,50,, | Performed by: RADIOLOGY

## 2022-04-06 PROCEDURE — 3079F DIAST BP 80-89 MM HG: CPT | Mod: CPTII,S$GLB,, | Performed by: INTERNAL MEDICINE

## 2022-04-06 PROCEDURE — 73030 X-RAY EXAM OF SHOULDER: CPT | Mod: 26,LT,, | Performed by: RADIOLOGY

## 2022-04-06 PROCEDURE — 1125F PR PAIN SEVERITY QUANTIFIED, PAIN PRESENT: ICD-10-PCS | Mod: CPTII,S$GLB,, | Performed by: INTERNAL MEDICINE

## 2022-04-06 PROCEDURE — 3008F BODY MASS INDEX DOCD: CPT | Mod: CPTII,S$GLB,, | Performed by: INTERNAL MEDICINE

## 2022-04-06 PROCEDURE — 20610 LARGE JOINT ASPIRATION/INJECTION: L GLENOHUMERAL: ICD-10-PCS | Mod: LT,S$GLB,, | Performed by: INTERNAL MEDICINE

## 2022-04-06 PROCEDURE — 3079F PR MOST RECENT DIASTOLIC BLOOD PRESSURE 80-89 MM HG: ICD-10-PCS | Mod: CPTII,S$GLB,, | Performed by: INTERNAL MEDICINE

## 2022-04-06 PROCEDURE — 99214 PR OFFICE/OUTPT VISIT, EST, LEVL IV, 30-39 MIN: ICD-10-PCS | Mod: 25,S$GLB,, | Performed by: INTERNAL MEDICINE

## 2022-04-06 PROCEDURE — 99214 OFFICE O/P EST MOD 30 MIN: CPT | Mod: 25,S$GLB,, | Performed by: INTERNAL MEDICINE

## 2022-04-06 RX ORDER — TRIAMCINOLONE ACETONIDE 40 MG/ML
40 INJECTION, SUSPENSION INTRA-ARTICULAR; INTRAMUSCULAR
Status: DISCONTINUED | OUTPATIENT
Start: 2022-04-06 | End: 2022-04-06 | Stop reason: HOSPADM

## 2022-04-06 RX ORDER — MELOXICAM 15 MG/1
15 TABLET ORAL DAILY PRN
Qty: 30 TABLET | Refills: 2 | Status: SHIPPED | OUTPATIENT
Start: 2022-04-06 | End: 2023-04-17

## 2022-04-06 RX ADMIN — TRIAMCINOLONE ACETONIDE 40 MG: 40 INJECTION, SUSPENSION INTRA-ARTICULAR; INTRAMUSCULAR at 09:04

## 2022-04-06 ASSESSMENT — ROUTINE ASSESSMENT OF PATIENT INDEX DATA (RAPID3)
PSYCHOLOGICAL DISTRESS SCORE: 1.1
TOTAL RAPID3 SCORE: 4.17
AM STIFFNESS SCORE: 0, NO
PATIENT GLOBAL ASSESSMENT SCORE: 4.5
MDHAQ FUNCTION SCORE: 0.6
PAIN SCORE: 6
FATIGUE SCORE: 6

## 2022-04-06 NOTE — PROGRESS NOTES
"Subjective:       Patient ID: Kristin Goodman is a 76 y.o. female.    Chief Complaint: Disease Management    HPI     Last saw me Feb 2020    Had meniscus surgery Dec 21, 2021; Dr Sullivan; she had PT and now doing fine; no cane or walker  Took meloxicam for knee pain ; off now    L shoulder started hurting about a month ago; hurt to reach over or up  Then R hand became painful; R hand swollen over MCP and PIP joints  Taking ES/Arthritis tylenol that helps some but not completely    PMH reviewed and no change in meds except anti-histamine    Review of Systems   Constitutional: Positive for fatigue and unexpected weight change. Negative for fever.        Says has dropped from about 160 to 142   HENT: Negative for mouth sores and trouble swallowing.         No Dry mouth   Eyes: Negative for redness.        No Dry eyes   Respiratory: Positive for shortness of breath. Negative for cough.    Cardiovascular: Negative for chest pain.   Gastrointestinal: Negative for abdominal pain, constipation and diarrhea.   Genitourinary: Negative for dysuria and genital sores.   Skin: Positive for rash.   Neurological: Negative for headaches.   Hematological: Negative for adenopathy. Does not bruise/bleed easily.   Psychiatric/Behavioral: Positive for sleep disturbance.         Objective:   BP (!) 176/87   Pulse 68   Ht 5' 1" (1.549 m)   Wt 74.9 kg (165 lb 2 oz)   BMI 31.20 kg/m²      Physical Exam   Musculoskeletal:      Comments: R hand with swollen tender MCP and PIP joints, tender wrist  L shoulder with limited tender passive ROM         Assessment:       1. Acute pain of left shoulder    2. Hand arthritis    3. Abnormal weight loss     4. Acute medial meniscal tear, right, initial encounter        Shoulder pain and exam c/w acute capsulitis  R hand synovitis also new; possible CPPD vs inflammatory arthritis     Plan:       Will get X-rays and return  If R shoulder benign then inject and resume meloxicam  If CPPD on hand X-rays then " resume meloxicam  Plan lab for RA vs CPPD      Problem List Items Addressed This Visit        Active Problems    Acute medial meniscal tear, right, initial encounter    Relevant Medications    meloxicam (MOBIC) 15 MG tablet      Other Visit Diagnoses     Acute pain of left shoulder    -  Primary    Relevant Orders    X-Ray Shoulder 2 or More Views Left    Hand arthritis        Relevant Orders    X-Ray Hand Complete Bilateral    CBC Auto Differential    Comprehensive Metabolic Panel    C-Reactive Protein    Sedimentation rate    Rheumatoid Factor    Cyclic Citrullinated Peptide Antibody, IgG    Uric Acid    Abnormal weight loss         Relevant Orders    CBC Auto Differential      later;   Shoulder XR benign  Hand films show some ip joint OA change ; no CPPD  Will inject shoulder, demonstrated exercises  Resume meloxicam  Lab today

## 2022-04-06 NOTE — PROCEDURES
Large Joint Aspiration/Injection: L glenohumeral    Date/Time: 4/6/2022 9:00 AM  Performed by: Woo Palacio MD  Authorized by: Woo Palacio MD     Consent Done?:  Yes (Verbal)  Indications:  Pain  Site marked: the procedure site was marked      Local anesthesia used?: Yes    Anesthesia:  Local infiltration  Local anesthetic:  Lidocaine 1% without epinephrine  Anesthetic total (ml):  2      Details:  Needle Size:  22 G  Ultrasonic Guidance for needle placement?: No    Approach:  Posterior  Location:  Shoulder  Site:  L glenohumeral  Medications:  40 mg triamcinolone acetonide 40 mg/mL  Aspirate amount (mL):  0  Patient tolerance:  Patient tolerated the procedure well with no immediate complications

## 2022-04-18 ENCOUNTER — HOSPITAL ENCOUNTER (EMERGENCY)
Facility: HOSPITAL | Age: 77
Discharge: HOME | End: 2022-04-18
Attending: EMERGENCY MEDICINE
Payer: MEDICARE

## 2022-04-18 VITALS
RESPIRATION RATE: 27 BRPM | TEMPERATURE: 99 F | HEIGHT: 61 IN | HEART RATE: 85 BPM | OXYGEN SATURATION: 100 % | DIASTOLIC BLOOD PRESSURE: 87 MMHG | WEIGHT: 162 LBS | BODY MASS INDEX: 30.58 KG/M2 | SYSTOLIC BLOOD PRESSURE: 199 MMHG

## 2022-04-18 DIAGNOSIS — R07.9 CHEST PAIN: ICD-10-CM

## 2022-04-18 DIAGNOSIS — I10 HYPERTENSION, UNSPECIFIED TYPE: Primary | ICD-10-CM

## 2022-04-18 DIAGNOSIS — R25.2 LEG CRAMPS: ICD-10-CM

## 2022-04-18 LAB
ALBUMIN SERPL BCP-MCNC: 3.5 G/DL (ref 3.5–5.2)
ALP SERPL-CCNC: 70 U/L (ref 55–135)
ALT SERPL W/O P-5'-P-CCNC: 27 U/L (ref 10–44)
ANION GAP SERPL CALC-SCNC: 8 MMOL/L (ref 8–16)
AST SERPL-CCNC: 35 U/L (ref 10–40)
BACTERIA #/AREA URNS HPF: ABNORMAL /HPF
BASOPHILS # BLD AUTO: 0.04 K/UL (ref 0–0.2)
BASOPHILS NFR BLD: 0.3 % (ref 0–1.9)
BILIRUB SERPL-MCNC: 0.3 MG/DL (ref 0.1–1)
BILIRUB UR QL STRIP: NEGATIVE
BNP SERPL-MCNC: 24 PG/ML (ref 0–99)
BUN SERPL-MCNC: 24 MG/DL (ref 8–23)
CALCIUM SERPL-MCNC: 9.1 MG/DL (ref 8.7–10.5)
CHLORIDE SERPL-SCNC: 106 MMOL/L (ref 95–110)
CK SERPL-CCNC: 362 U/L (ref 20–180)
CLARITY UR: CLEAR
CO2 SERPL-SCNC: 21 MMOL/L (ref 23–29)
COLOR UR: COLORLESS
CREAT SERPL-MCNC: 1.2 MG/DL (ref 0.5–1.4)
DIFFERENTIAL METHOD: ABNORMAL
EOSINOPHIL # BLD AUTO: 0.1 K/UL (ref 0–0.5)
EOSINOPHIL NFR BLD: 0.4 % (ref 0–8)
ERYTHROCYTE [DISTWIDTH] IN BLOOD BY AUTOMATED COUNT: 14 % (ref 11.5–14.5)
EST. GFR  (AFRICAN AMERICAN): 51 ML/MIN/1.73 M^2
EST. GFR  (NON AFRICAN AMERICAN): 44 ML/MIN/1.73 M^2
GLUCOSE SERPL-MCNC: 125 MG/DL (ref 70–110)
GLUCOSE UR QL STRIP: NEGATIVE
HCT VFR BLD AUTO: 41.5 % (ref 37–48.5)
HGB BLD-MCNC: 13.4 G/DL (ref 12–16)
HGB UR QL STRIP: NEGATIVE
IMM GRANULOCYTES # BLD AUTO: 0.06 K/UL (ref 0–0.04)
IMM GRANULOCYTES NFR BLD AUTO: 0.5 % (ref 0–0.5)
KETONES UR QL STRIP: NEGATIVE
LEUKOCYTE ESTERASE UR QL STRIP: ABNORMAL
LYMPHOCYTES # BLD AUTO: 1.6 K/UL (ref 1–4.8)
LYMPHOCYTES NFR BLD: 13.8 % (ref 18–48)
MAGNESIUM SERPL-MCNC: 2 MG/DL (ref 1.6–2.6)
MCH RBC QN AUTO: 28.9 PG (ref 27–31)
MCHC RBC AUTO-ENTMCNC: 32.3 G/DL (ref 32–36)
MCV RBC AUTO: 90 FL (ref 82–98)
MICROSCOPIC COMMENT: ABNORMAL
MONOCYTES # BLD AUTO: 1.1 K/UL (ref 0.3–1)
MONOCYTES NFR BLD: 9.7 % (ref 4–15)
NEUTROPHILS # BLD AUTO: 8.7 K/UL (ref 1.8–7.7)
NEUTROPHILS NFR BLD: 75.3 % (ref 38–73)
NITRITE UR QL STRIP: NEGATIVE
NRBC BLD-RTO: 0 /100 WBC
PH UR STRIP: 7 [PH] (ref 5–8)
PLATELET # BLD AUTO: 372 K/UL (ref 150–450)
PMV BLD AUTO: 10.1 FL (ref 9.2–12.9)
POTASSIUM SERPL-SCNC: 4.5 MMOL/L (ref 3.5–5.1)
PROT SERPL-MCNC: 8.3 G/DL (ref 6–8.4)
PROT UR QL STRIP: NEGATIVE
RBC # BLD AUTO: 4.63 M/UL (ref 4–5.4)
RBC #/AREA URNS HPF: 3 /HPF (ref 0–4)
SODIUM SERPL-SCNC: 135 MMOL/L (ref 136–145)
SP GR UR STRIP: <1.005 (ref 1–1.03)
SQUAMOUS #/AREA URNS HPF: 2 /HPF
TROPONIN I SERPL DL<=0.01 NG/ML-MCNC: <0.006 NG/ML (ref 0–0.03)
URN SPEC COLLECT METH UR: ABNORMAL
UROBILINOGEN UR STRIP-ACNC: NEGATIVE EU/DL
WBC # BLD AUTO: 11.59 K/UL (ref 3.9–12.7)
WBC #/AREA URNS HPF: 1 /HPF (ref 0–5)

## 2022-04-18 PROCEDURE — 25000003 PHARM REV CODE 250: Performed by: EMERGENCY MEDICINE

## 2022-04-18 PROCEDURE — 93005 ELECTROCARDIOGRAM TRACING: CPT

## 2022-04-18 PROCEDURE — 83735 ASSAY OF MAGNESIUM: CPT | Performed by: EMERGENCY MEDICINE

## 2022-04-18 PROCEDURE — 81003 URINALYSIS AUTO W/O SCOPE: CPT | Performed by: EMERGENCY MEDICINE

## 2022-04-18 PROCEDURE — 93010 ELECTROCARDIOGRAM REPORT: CPT | Mod: ,,, | Performed by: INTERNAL MEDICINE

## 2022-04-18 PROCEDURE — 83880 ASSAY OF NATRIURETIC PEPTIDE: CPT | Performed by: EMERGENCY MEDICINE

## 2022-04-18 PROCEDURE — 96374 THER/PROPH/DIAG INJ IV PUSH: CPT

## 2022-04-18 PROCEDURE — 81000 URINALYSIS NONAUTO W/SCOPE: CPT | Performed by: EMERGENCY MEDICINE

## 2022-04-18 PROCEDURE — 82550 ASSAY OF CK (CPK): CPT | Performed by: EMERGENCY MEDICINE

## 2022-04-18 PROCEDURE — 63600175 PHARM REV CODE 636 W HCPCS: Performed by: EMERGENCY MEDICINE

## 2022-04-18 PROCEDURE — 85025 COMPLETE CBC W/AUTO DIFF WBC: CPT | Performed by: EMERGENCY MEDICINE

## 2022-04-18 PROCEDURE — 96361 HYDRATE IV INFUSION ADD-ON: CPT

## 2022-04-18 PROCEDURE — 80053 COMPREHEN METABOLIC PANEL: CPT | Performed by: EMERGENCY MEDICINE

## 2022-04-18 PROCEDURE — 93010 ELECTROCARDIOGRAM REPORT: CPT | Mod: 76,,, | Performed by: INTERNAL MEDICINE

## 2022-04-18 PROCEDURE — 84484 ASSAY OF TROPONIN QUANT: CPT | Performed by: EMERGENCY MEDICINE

## 2022-04-18 PROCEDURE — 99285 EMERGENCY DEPT VISIT HI MDM: CPT | Mod: 25

## 2022-04-18 PROCEDURE — 93010 EKG 12-LEAD: ICD-10-PCS | Mod: ,,, | Performed by: INTERNAL MEDICINE

## 2022-04-18 RX ORDER — ASPIRIN 325 MG
325 TABLET ORAL
Status: COMPLETED | OUTPATIENT
Start: 2022-04-18 | End: 2022-04-18

## 2022-04-18 RX ORDER — DIAZEPAM 10 MG/2ML
5 INJECTION INTRAMUSCULAR
Status: COMPLETED | OUTPATIENT
Start: 2022-04-18 | End: 2022-04-18

## 2022-04-18 RX ADMIN — DIAZEPAM 5 MG: 10 INJECTION, SOLUTION INTRAMUSCULAR; INTRAVENOUS at 01:04

## 2022-04-18 RX ADMIN — ASPIRIN 325 MG ORAL TABLET 325 MG: 325 PILL ORAL at 01:04

## 2022-04-18 RX ADMIN — SODIUM CHLORIDE 1000 ML: 0.9 INJECTION, SOLUTION INTRAVENOUS at 01:04
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
## 2022-04-18 NOTE — ED TRIAGE NOTES
Pt c/o leg cramps that started this AM when she was walking to the store.  She states the cramps are consistent and are painful, causing her to be very restless.  She denies any CP or SOB, states that she has had these cramps before when her potassium levels were high.

## 2022-05-24 ENCOUNTER — TELEPHONE (OUTPATIENT)
Dept: FAMILY MEDICINE | Facility: CLINIC | Age: 77
End: 2022-05-24
Payer: MEDICARE

## 2022-05-24 NOTE — TELEPHONE ENCOUNTER
----- Message from Rosa Liriano sent at 5/24/2022 11:47 AM CDT -----  Regarding: Call BAck  Name of Who is Calling:DERICK DESAI [[id number]]              What is the request in detail: Patient requesting a appointment for 05-. Has appointment 07- and on wait list, Please assist              Can the clinic reply by [facility]: No              [facility][phone]

## 2022-05-27 ENCOUNTER — OFFICE VISIT (OUTPATIENT)
Dept: FAMILY MEDICINE | Facility: CLINIC | Age: 77
End: 2022-05-27
Payer: MEDICARE

## 2022-05-27 VITALS
OXYGEN SATURATION: 97 % | WEIGHT: 153.44 LBS | BODY MASS INDEX: 28.97 KG/M2 | TEMPERATURE: 98 F | HEIGHT: 61 IN | RESPIRATION RATE: 17 BRPM | DIASTOLIC BLOOD PRESSURE: 90 MMHG | SYSTOLIC BLOOD PRESSURE: 140 MMHG | HEART RATE: 80 BPM

## 2022-05-27 DIAGNOSIS — M46.90 INFLAMMATORY SPONDYLOPATHY, UNSPECIFIED SPINAL REGION: ICD-10-CM

## 2022-05-27 DIAGNOSIS — I70.1 ATHEROSCLEROSIS OF RENAL ARTERY: ICD-10-CM

## 2022-05-27 DIAGNOSIS — I10 PRIMARY HYPERTENSION: ICD-10-CM

## 2022-05-27 DIAGNOSIS — J06.9 VIRAL UPPER RESPIRATORY TRACT INFECTION: Primary | ICD-10-CM

## 2022-05-27 DIAGNOSIS — R19.7 DIARRHEA, UNSPECIFIED TYPE: ICD-10-CM

## 2022-05-27 DIAGNOSIS — I50.32 CHRONIC DIASTOLIC HEART FAILURE: ICD-10-CM

## 2022-05-27 PROCEDURE — 1159F PR MEDICATION LIST DOCUMENTED IN MEDICAL RECORD: ICD-10-PCS | Mod: CPTII,S$GLB,, | Performed by: INTERNAL MEDICINE

## 2022-05-27 PROCEDURE — 3077F PR MOST RECENT SYSTOLIC BLOOD PRESSURE >= 140 MM HG: ICD-10-PCS | Mod: CPTII,S$GLB,, | Performed by: INTERNAL MEDICINE

## 2022-05-27 PROCEDURE — 1125F PR PAIN SEVERITY QUANTIFIED, PAIN PRESENT: ICD-10-PCS | Mod: CPTII,S$GLB,, | Performed by: INTERNAL MEDICINE

## 2022-05-27 PROCEDURE — 3077F SYST BP >= 140 MM HG: CPT | Mod: CPTII,S$GLB,, | Performed by: INTERNAL MEDICINE

## 2022-05-27 PROCEDURE — 3288F PR FALLS RISK ASSESSMENT DOCUMENTED: ICD-10-PCS | Mod: CPTII,S$GLB,, | Performed by: INTERNAL MEDICINE

## 2022-05-27 PROCEDURE — 3080F DIAST BP >= 90 MM HG: CPT | Mod: CPTII,S$GLB,, | Performed by: INTERNAL MEDICINE

## 2022-05-27 PROCEDURE — 1159F MED LIST DOCD IN RCRD: CPT | Mod: CPTII,S$GLB,, | Performed by: INTERNAL MEDICINE

## 2022-05-27 PROCEDURE — 1125F AMNT PAIN NOTED PAIN PRSNT: CPT | Mod: CPTII,S$GLB,, | Performed by: INTERNAL MEDICINE

## 2022-05-27 PROCEDURE — 3288F FALL RISK ASSESSMENT DOCD: CPT | Mod: CPTII,S$GLB,, | Performed by: INTERNAL MEDICINE

## 2022-05-27 PROCEDURE — 1160F RVW MEDS BY RX/DR IN RCRD: CPT | Mod: CPTII,S$GLB,, | Performed by: INTERNAL MEDICINE

## 2022-05-27 PROCEDURE — 99214 PR OFFICE/OUTPT VISIT, EST, LEVL IV, 30-39 MIN: ICD-10-PCS | Mod: S$GLB,,, | Performed by: INTERNAL MEDICINE

## 2022-05-27 PROCEDURE — 1160F PR REVIEW ALL MEDS BY PRESCRIBER/CLIN PHARMACIST DOCUMENTED: ICD-10-PCS | Mod: CPTII,S$GLB,, | Performed by: INTERNAL MEDICINE

## 2022-05-27 PROCEDURE — 3080F PR MOST RECENT DIASTOLIC BLOOD PRESSURE >= 90 MM HG: ICD-10-PCS | Mod: CPTII,S$GLB,, | Performed by: INTERNAL MEDICINE

## 2022-05-27 PROCEDURE — 99214 OFFICE O/P EST MOD 30 MIN: CPT | Mod: S$GLB,,, | Performed by: INTERNAL MEDICINE

## 2022-05-27 PROCEDURE — 1101F PT FALLS ASSESS-DOCD LE1/YR: CPT | Mod: CPTII,S$GLB,, | Performed by: INTERNAL MEDICINE

## 2022-05-27 PROCEDURE — 99999 PR PBB SHADOW E&M-EST. PATIENT-LVL IV: CPT | Mod: PBBFAC,,, | Performed by: INTERNAL MEDICINE

## 2022-05-27 PROCEDURE — 1101F PR PT FALLS ASSESS DOC 0-1 FALLS W/OUT INJ PAST YR: ICD-10-PCS | Mod: CPTII,S$GLB,, | Performed by: INTERNAL MEDICINE

## 2022-05-27 PROCEDURE — 99999 PR PBB SHADOW E&M-EST. PATIENT-LVL IV: ICD-10-PCS | Mod: PBBFAC,,, | Performed by: INTERNAL MEDICINE

## 2022-05-27 RX ORDER — MUPIROCIN 20 MG/G
OINTMENT TOPICAL 2 TIMES DAILY
COMMUNITY
Start: 2022-04-08 | End: 2023-08-06

## 2022-05-27 NOTE — PROGRESS NOTES
Subjective:       Patient ID: Kristin Goodman is a pleasant 77 y.o. female patient    Chief Complaint: Sore Throat, running nose , Otalgia (Both ears ), eyes itching  (Sx stared 1 wk ago ), and Diarrhea (OFF AND ON)      Patient is established to me, last visit on 05/18/2021.    As per problems:    1) URI:  Coughing, runny nose, sore throat, ear pain, productive cough, lymphadenopathy, fatigue for 1 week. No fever. Sx have slightly improved over course of week.  Associated with diarrhea for 3 days. No blood in stool, nausea, vomiting. She hasn't tried medications for this.  Has been drinking Gatorade. Took home COVID test which was negative.  She has seasonal allergies and has been taking Flonase, Xyzal. Using inhaler twice a day.  Works in  - several kids have been sick.    2) HTN:  BP in clinic 140/90.   Patient states she hasn't taken her meds today.  She also states amlodipine makes her sleepy during the day so she often doesn't take it since she needs to be alert at work.    3) Health maintenance:  She plans to take 2nd COVID booster once current sx resolve.       Patient Active Problem List   Diagnosis    Hypothyroid    HTN (hypertension)    Trochanteric bursitis of right hip    Mitral valve regurgitation    Chest pain    Elevated CPK    Syncope    Inflammatory spondylopathy    CAREY (dyspnea on exertion)    Pulmonary HTN    Chronic diastolic heart failure    Colon cancer screening    DONAVAN (obstructive sleep apnea)    Sleep arousal disorder    Acute medial meniscal tear, right, initial encounter    Decreased range of motion (ROM) of right knee    Quadriceps weakness    Weakness of right hip    Impaired mobility and ADLs    Acute medial meniscal injury of right knee    S/P meniscectomy    Atherosclerosis of renal artery          ACTIVE MEDICAL ISSUES:  Documented in Problem List     PAST MEDICAL HISTORY  Documented     PAST SURGICAL HISTORY:  Documented     SOCIAL HISTORY:  Documented      FAMILY HISTORY:  Documented     ALLERGIES AND MEDICATIONS: updated and reviewed.  Documented    Review of Systems   Constitutional: Positive for activity change, appetite change and fatigue. Negative for chills, fever and unexpected weight change.   HENT: Positive for congestion, ear pain, postnasal drip, rhinorrhea, sneezing and sore throat. Negative for ear discharge, facial swelling, sinus pressure and sinus pain.    Eyes: Negative.    Respiratory: Positive for cough. Negative for chest tightness, shortness of breath and wheezing.    Cardiovascular: Negative.    Gastrointestinal: Positive for abdominal pain and diarrhea. Negative for blood in stool, constipation, nausea and vomiting.   Allergic/Immunologic: Positive for environmental allergies.   Neurological: Negative.        Objective:      Physical Exam  Constitutional:       Appearance: She is ill-appearing.   HENT:      Head: Normocephalic and atraumatic.      Right Ear: Ear canal and external ear normal.      Left Ear: Ear canal and external ear normal.      Ears:      Comments: BL effusions     Nose: Congestion present.      Mouth/Throat:      Mouth: Mucous membranes are moist.      Pharynx: Posterior oropharyngeal erythema present. No oropharyngeal exudate.   Eyes:      Extraocular Movements: Extraocular movements intact.      Conjunctiva/sclera: Conjunctivae normal.   Cardiovascular:      Rate and Rhythm: Normal rate and regular rhythm.      Pulses: Normal pulses.      Heart sounds: Normal heart sounds.   Pulmonary:      Effort: Pulmonary effort is normal.      Breath sounds: Normal breath sounds.   Abdominal:      General: Abdomen is flat.      Palpations: Abdomen is soft.   Musculoskeletal:      Right lower leg: No edema.      Left lower leg: No edema.   Lymphadenopathy:      Cervical: Cervical adenopathy present.   Skin:     General: Skin is warm and dry.   Neurological:      General: No focal deficit present.      Mental Status: She is alert and  "oriented to person, place, and time.   Psychiatric:         Mood and Affect: Mood normal.         Behavior: Behavior normal.         Thought Content: Thought content normal.         Judgment: Judgment normal.         Vitals:    05/27/22 0951   BP: (!) 140/90   BP Location: Left arm   Patient Position: Sitting   BP Method: Small (Manual)   Pulse: 80   Resp: 17   Temp: 98.1 °F (36.7 °C)   TempSrc: Oral   SpO2: 97%   Weight: 69.6 kg (153 lb 7 oz)   Height: 5' 1" (1.549 m)     Body mass index is 28.99 kg/m².    RESULTS: Reviewed labs from last 6 months    Last Lab Results:     Lab Results   Component Value Date    WBC 11.59 04/18/2022    HGB 13.4 04/18/2022    HCT 41.5 04/18/2022     04/18/2022     (L) 04/18/2022    K 4.5 04/18/2022     04/18/2022    CO2 21 (L) 04/18/2022    BUN 24 (H) 04/18/2022    CREATININE 1.2 04/18/2022    CALCIUM 9.1 04/18/2022    ALBUMIN 3.5 04/18/2022    AST 35 04/18/2022    ALT 27 04/18/2022    CHOL 165 11/22/2021    TRIG 138 11/22/2021    HDL 43 11/22/2021    LDLCALC 94.4 11/22/2021    HGBA1C 5.4 11/22/2021    TSH 1.042 01/08/2021         Assessment:       1. Viral upper respiratory tract infection    2. Diarrhea, unspecified type    3. Primary hypertension    4. Atherosclerosis of renal artery    5. Inflammatory spondylopathy, unspecified spinal region    6. Chronic diastolic heart failure        Plan:   Kristin was seen today for sore throat, running nose , otalgia, eyes itching  and diarrhea.    Diagnoses and all orders for this visit:    Viral upper respiratory tract infection    In a pt working in a  with babies. Several have same symptoms. No symptoms of alert. PE with no major findings. Pt feeling a bit better, will take symptomatic treatment and contact me if worsening issue. Absence of work provided, see in letters.    Diarrhea, unspecified type    See above, seems to be related to above.    Primary hypertension    BP a bit elevated but did not take her BP " meds today.    Atherosclerosis of renal artery    As per list of problems.    Inflammatory spondylopathy, unspecified spinal region    As per list of problems.    Chronic diastolic heart failure    As per former TTE, see list of problems.    No follow-ups on file.     Health Maintenance       Date Due Completion Date    Shingles Vaccine (1 of 2) Never done ---    COVID-19 Vaccine (4 - Booster for Moderna series) 04/13/2022 12/13/2021    DEXA Scan 06/04/2024 6/4/2021    Colorectal Cancer Screening 09/28/2025 9/28/2020    TETANUS VACCINE 08/16/2026 8/16/2016    Lipid Panel 11/22/2026 11/22/2021          Kandace Echevarria, MS4  [facility]       I hereby acknowledge that I am relying upon documentation authored by a medical student working under my supervision and further I hereby attest that I have verified the student documentation or findings by personally re-performing the physical exam and medical decision making activities of the Evaluation and Management service to be billed.    Lori Hernandez     This note was created by combination of typed  and M-Modal dictation.  Transcription errors may be present.  If there are any questions, please contact me.

## 2022-05-27 NOTE — LETTER
[address]  ? [place], [address] ? Phone [phone] ? Fax [phone]           Return to Work    Patient: Kristin Goodman  YOB: 1945   Date: 05/27/2022      To Whom It May Concern:     Kristin Goodman was seen in my office on 05/27/2022.  She has been out of work from 05/24/2022 for medical reasons, and will go back on 05/31/2022.    If you have any questions or concerns, or if I can be of further assistance, please do not hesitate to contact me.     Sincerely,    Lori Hernandez MD

## 2022-05-30 PROBLEM — D69.2 OTHER NONTHROMBOCYTOPENIC PURPURA: Status: ACTIVE | Noted: 2022-05-30

## 2022-05-30 PROBLEM — I70.1 ATHEROSCLEROSIS OF RENAL ARTERY: Status: ACTIVE | Noted: 2022-05-30

## 2022-05-30 PROBLEM — D69.2 OTHER NONTHROMBOCYTOPENIC PURPURA: Status: RESOLVED | Noted: 2022-05-30 | Resolved: 2022-05-30

## 2022-07-15 DIAGNOSIS — E03.9 ACQUIRED HYPOTHYROIDISM: ICD-10-CM

## 2022-07-18 RX ORDER — LEVOTHYROXINE SODIUM 25 UG/1
TABLET ORAL
Qty: 90 TABLET | Refills: 0 | Status: ON HOLD | OUTPATIENT
Start: 2022-07-18 | End: 2023-01-31 | Stop reason: SDUPTHER

## 2022-07-26 NOTE — TELEPHONE ENCOUNTER
----- Message from Jihan Lyons sent at 7/26/2022  1:08 PM CDT -----  Type:  Sooner Appointment Request    Patient is requesting a sooner appointment.  Patient declined first available appointment listed as well as another facility and provider .  Patient will not accept being placed on the waitlist and is requesting a message be sent to doctor.    Name of Caller: self     When is the first available appointment? 10/5     Symptoms: Blood work, requesting something for 7/29 afternoon if possible.     Would the patient rather a call back or a response via My [employer]? Call back     Best Call Back Number: [phone]

## 2022-08-02 ENCOUNTER — LAB VISIT (OUTPATIENT)
Dept: LAB | Facility: HOSPITAL | Age: 77
End: 2022-08-02
Payer: MEDICARE

## 2022-08-02 ENCOUNTER — OFFICE VISIT (OUTPATIENT)
Dept: FAMILY MEDICINE | Facility: CLINIC | Age: 77
End: 2022-08-02
Payer: MEDICARE

## 2022-08-02 VITALS
WEIGHT: 156.75 LBS | DIASTOLIC BLOOD PRESSURE: 100 MMHG | HEART RATE: 86 BPM | BODY MASS INDEX: 29.59 KG/M2 | TEMPERATURE: 99 F | RESPIRATION RATE: 17 BRPM | SYSTOLIC BLOOD PRESSURE: 156 MMHG | OXYGEN SATURATION: 99 % | HEIGHT: 61 IN

## 2022-08-02 DIAGNOSIS — L50.9 URTICARIA: ICD-10-CM

## 2022-08-02 DIAGNOSIS — N30.00 ACUTE CYSTITIS WITHOUT HEMATURIA: Primary | ICD-10-CM

## 2022-08-02 DIAGNOSIS — R35.89 POLYURIA: ICD-10-CM

## 2022-08-02 DIAGNOSIS — E03.9 HYPOTHYROIDISM, UNSPECIFIED TYPE: ICD-10-CM

## 2022-08-02 DIAGNOSIS — R73.01 IMPAIRED FASTING GLUCOSE: ICD-10-CM

## 2022-08-02 LAB
ALBUMIN SERPL BCP-MCNC: 3.5 G/DL (ref 3.5–5.2)
ALP SERPL-CCNC: 65 U/L (ref 55–135)
ALT SERPL W/O P-5'-P-CCNC: 22 U/L (ref 10–44)
ANION GAP SERPL CALC-SCNC: 10 MMOL/L (ref 8–16)
AST SERPL-CCNC: 33 U/L (ref 10–40)
BACTERIA #/AREA URNS AUTO: ABNORMAL /HPF
BASOPHILS # BLD AUTO: 0.04 K/UL (ref 0–0.2)
BASOPHILS NFR BLD: 0.5 % (ref 0–1.9)
BILIRUB SERPL-MCNC: 0.3 MG/DL (ref 0.1–1)
BILIRUB SERPL-MCNC: ABNORMAL MG/DL
BILIRUB UR QL STRIP: NEGATIVE
BLOOD URINE, POC: ABNORMAL
BUN SERPL-MCNC: 19 MG/DL (ref 8–23)
CALCIUM SERPL-MCNC: 9.5 MG/DL (ref 8.7–10.5)
CHLORIDE SERPL-SCNC: 105 MMOL/L (ref 95–110)
CLARITY UR REFRACT.AUTO: ABNORMAL
CLARITY, POC UA: ABNORMAL
CO2 SERPL-SCNC: 24 MMOL/L (ref 23–29)
COLOR UR AUTO: YELLOW
COLOR, POC UA: YELLOW
CREAT SERPL-MCNC: 1 MG/DL (ref 0.5–1.4)
DIFFERENTIAL METHOD: NORMAL
EOSINOPHIL # BLD AUTO: 0.3 K/UL (ref 0–0.5)
EOSINOPHIL NFR BLD: 2.8 % (ref 0–8)
ERYTHROCYTE [DISTWIDTH] IN BLOOD BY AUTOMATED COUNT: 14.5 % (ref 11.5–14.5)
EST. GFR  (NO RACE VARIABLE): 58.8 ML/MIN/1.73 M^2
ESTIMATED AVG GLUCOSE: 111 MG/DL (ref 68–131)
GLUCOSE SERPL-MCNC: 89 MG/DL (ref 70–110)
GLUCOSE UR QL STRIP: NEGATIVE
GLUCOSE UR QL STRIP: NORMAL
HBA1C MFR BLD: 5.5 % (ref 4–5.6)
HCT VFR BLD AUTO: 42.5 % (ref 37–48.5)
HGB BLD-MCNC: 13.6 G/DL (ref 12–16)
HGB UR QL STRIP: ABNORMAL
HYALINE CASTS UR QL AUTO: 0 /LPF
IMM GRANULOCYTES # BLD AUTO: 0.03 K/UL (ref 0–0.04)
IMM GRANULOCYTES NFR BLD AUTO: 0.3 % (ref 0–0.5)
KETONES UR QL STRIP: ABNORMAL
KETONES UR QL STRIP: NEGATIVE
LEUKOCYTE ESTERASE UR QL STRIP: ABNORMAL
LEUKOCYTE ESTERASE URINE, POC: ABNORMAL
LYMPHOCYTES # BLD AUTO: 2.7 K/UL (ref 1–4.8)
LYMPHOCYTES NFR BLD: 30.5 % (ref 18–48)
MCH RBC QN AUTO: 29.1 PG (ref 27–31)
MCHC RBC AUTO-ENTMCNC: 32 G/DL (ref 32–36)
MCV RBC AUTO: 91 FL (ref 82–98)
MICROSCOPIC COMMENT: ABNORMAL
MONOCYTES # BLD AUTO: 1 K/UL (ref 0.3–1)
MONOCYTES NFR BLD: 11.3 % (ref 4–15)
NEUTROPHILS # BLD AUTO: 4.8 K/UL (ref 1.8–7.7)
NEUTROPHILS NFR BLD: 54.6 % (ref 38–73)
NITRITE UR QL STRIP: POSITIVE
NITRITE, POC UA: NEGATIVE
NRBC BLD-RTO: 0 /100 WBC
PH UR STRIP: 5 [PH] (ref 5–8)
PH, POC UA: 5
PLATELET # BLD AUTO: 410 K/UL (ref 150–450)
PMV BLD AUTO: 11.1 FL (ref 9.2–12.9)
POTASSIUM SERPL-SCNC: 4.8 MMOL/L (ref 3.5–5.1)
PROT SERPL-MCNC: 8.1 G/DL (ref 6–8.4)
PROT UR QL STRIP: ABNORMAL
PROTEIN, POC: ABNORMAL
RBC # BLD AUTO: 4.67 M/UL (ref 4–5.4)
RBC #/AREA URNS AUTO: 38 /HPF (ref 0–4)
SODIUM SERPL-SCNC: 139 MMOL/L (ref 136–145)
SP GR UR STRIP: 1.02 (ref 1–1.03)
SPECIFIC GRAVITY, POC UA: 1.02
T4 FREE SERPL-MCNC: 0.99 NG/DL (ref 0.71–1.51)
TSH SERPL DL<=0.005 MIU/L-ACNC: 1.48 UIU/ML (ref 0.4–4)
URN SPEC COLLECT METH UR: ABNORMAL
UROBILINOGEN, POC UA: NORMAL
WBC # BLD AUTO: 8.86 K/UL (ref 3.9–12.7)
WBC #/AREA URNS AUTO: 41 /HPF (ref 0–5)

## 2022-08-02 PROCEDURE — 3080F PR MOST RECENT DIASTOLIC BLOOD PRESSURE >= 90 MM HG: ICD-10-PCS | Mod: CPTII,S$GLB,, | Performed by: PHYSICIAN ASSISTANT

## 2022-08-02 PROCEDURE — 3077F SYST BP >= 140 MM HG: CPT | Mod: CPTII,S$GLB,, | Performed by: PHYSICIAN ASSISTANT

## 2022-08-02 PROCEDURE — 85025 COMPLETE CBC W/AUTO DIFF WBC: CPT | Performed by: PHYSICIAN ASSISTANT

## 2022-08-02 PROCEDURE — 36415 COLL VENOUS BLD VENIPUNCTURE: CPT | Mod: PO | Performed by: PHYSICIAN ASSISTANT

## 2022-08-02 PROCEDURE — 3044F PR MOST RECENT HEMOGLOBIN A1C LEVEL <7.0%: ICD-10-PCS | Mod: CPTII,S$GLB,, | Performed by: PHYSICIAN ASSISTANT

## 2022-08-02 PROCEDURE — 84439 ASSAY OF FREE THYROXINE: CPT | Performed by: PHYSICIAN ASSISTANT

## 2022-08-02 PROCEDURE — 99214 PR OFFICE/OUTPT VISIT, EST, LEVL IV, 30-39 MIN: ICD-10-PCS | Mod: S$GLB,,, | Performed by: PHYSICIAN ASSISTANT

## 2022-08-02 PROCEDURE — 84443 ASSAY THYROID STIM HORMONE: CPT | Performed by: PHYSICIAN ASSISTANT

## 2022-08-02 PROCEDURE — 1126F PR PAIN SEVERITY QUANTIFIED, NO PAIN PRESENT: ICD-10-PCS | Mod: CPTII,S$GLB,, | Performed by: PHYSICIAN ASSISTANT

## 2022-08-02 PROCEDURE — 87077 CULTURE AEROBIC IDENTIFY: CPT | Performed by: PHYSICIAN ASSISTANT

## 2022-08-02 PROCEDURE — 3080F DIAST BP >= 90 MM HG: CPT | Mod: CPTII,S$GLB,, | Performed by: PHYSICIAN ASSISTANT

## 2022-08-02 PROCEDURE — 1160F PR REVIEW ALL MEDS BY PRESCRIBER/CLIN PHARMACIST DOCUMENTED: ICD-10-PCS | Mod: CPTII,S$GLB,, | Performed by: PHYSICIAN ASSISTANT

## 2022-08-02 PROCEDURE — 1159F MED LIST DOCD IN RCRD: CPT | Mod: CPTII,S$GLB,, | Performed by: PHYSICIAN ASSISTANT

## 2022-08-02 PROCEDURE — 81002 URINALYSIS NONAUTO W/O SCOPE: CPT | Mod: S$GLB,,, | Performed by: PHYSICIAN ASSISTANT

## 2022-08-02 PROCEDURE — 99214 OFFICE O/P EST MOD 30 MIN: CPT | Mod: S$GLB,,, | Performed by: PHYSICIAN ASSISTANT

## 2022-08-02 PROCEDURE — 87088 URINE BACTERIA CULTURE: CPT | Performed by: PHYSICIAN ASSISTANT

## 2022-08-02 PROCEDURE — 81002 POCT URINE DIPSTICK WITHOUT MICROSCOPE: ICD-10-PCS | Mod: S$GLB,,, | Performed by: PHYSICIAN ASSISTANT

## 2022-08-02 PROCEDURE — 81001 URINALYSIS AUTO W/SCOPE: CPT | Performed by: PHYSICIAN ASSISTANT

## 2022-08-02 PROCEDURE — 1159F PR MEDICATION LIST DOCUMENTED IN MEDICAL RECORD: ICD-10-PCS | Mod: CPTII,S$GLB,, | Performed by: PHYSICIAN ASSISTANT

## 2022-08-02 PROCEDURE — 87086 URINE CULTURE/COLONY COUNT: CPT | Performed by: PHYSICIAN ASSISTANT

## 2022-08-02 PROCEDURE — 80053 COMPREHEN METABOLIC PANEL: CPT | Performed by: PHYSICIAN ASSISTANT

## 2022-08-02 PROCEDURE — 87186 SC STD MICRODIL/AGAR DIL: CPT | Performed by: PHYSICIAN ASSISTANT

## 2022-08-02 PROCEDURE — 99999 PR PBB SHADOW E&M-EST. PATIENT-LVL IV: CPT | Mod: PBBFAC,,, | Performed by: PHYSICIAN ASSISTANT

## 2022-08-02 PROCEDURE — 3077F PR MOST RECENT SYSTOLIC BLOOD PRESSURE >= 140 MM HG: ICD-10-PCS | Mod: CPTII,S$GLB,, | Performed by: PHYSICIAN ASSISTANT

## 2022-08-02 PROCEDURE — 3288F PR FALLS RISK ASSESSMENT DOCUMENTED: ICD-10-PCS | Mod: CPTII,S$GLB,, | Performed by: PHYSICIAN ASSISTANT

## 2022-08-02 PROCEDURE — 99999 PR PBB SHADOW E&M-EST. PATIENT-LVL IV: ICD-10-PCS | Mod: PBBFAC,,, | Performed by: PHYSICIAN ASSISTANT

## 2022-08-02 PROCEDURE — 1126F AMNT PAIN NOTED NONE PRSNT: CPT | Mod: CPTII,S$GLB,, | Performed by: PHYSICIAN ASSISTANT

## 2022-08-02 PROCEDURE — 1101F PR PT FALLS ASSESS DOC 0-1 FALLS W/OUT INJ PAST YR: ICD-10-PCS | Mod: CPTII,S$GLB,, | Performed by: PHYSICIAN ASSISTANT

## 2022-08-02 PROCEDURE — 3044F HG A1C LEVEL LT 7.0%: CPT | Mod: CPTII,S$GLB,, | Performed by: PHYSICIAN ASSISTANT

## 2022-08-02 PROCEDURE — 1160F RVW MEDS BY RX/DR IN RCRD: CPT | Mod: CPTII,S$GLB,, | Performed by: PHYSICIAN ASSISTANT

## 2022-08-02 PROCEDURE — 1101F PT FALLS ASSESS-DOCD LE1/YR: CPT | Mod: CPTII,S$GLB,, | Performed by: PHYSICIAN ASSISTANT

## 2022-08-02 PROCEDURE — 83036 HEMOGLOBIN GLYCOSYLATED A1C: CPT | Performed by: PHYSICIAN ASSISTANT

## 2022-08-02 PROCEDURE — 3288F FALL RISK ASSESSMENT DOCD: CPT | Mod: CPTII,S$GLB,, | Performed by: PHYSICIAN ASSISTANT

## 2022-08-02 NOTE — PROGRESS NOTES
Health Maintenance Due   Topic Date Due    High Dose Statin  Discuss with pcp    Shingles Vaccine (1 of 2) ADVISE TO GO TO THE PHARMACY    COVID-19 Vaccine (4 - Booster for Moderna series) Discuss with pcp

## 2022-08-02 NOTE — PROGRESS NOTES
Subjective:       Patient ID: Kristin Goodman is a 77 y.o. female.    Chief Complaint: Urinary Frequency, SKIN ITCHING , and R/O DM     HPI   76 y/o female presents with increased thirst and urination x 2 weeks. Reports nocturia. Denies dysuria, difficulty urinating, and pelvic and abdominal pain. Diet is majority baked fish and chicken, vegetables, little rice, and low in sugars. Drinks unsweetened tea and about 2-3 bottles of water daily. Pt states she has family hx of DM and wants to rule it out.   Pt also reports itchiness all over her arms, legs, back, and stomach x 3 weeks. Changed body wash from Dove to Dial a while ago due to increased itching; denies change in lotion or detergent. Uses kashmir butter lotion.       Review of Systems   Eyes: Negative for visual disturbance.   Respiratory: Negative for shortness of breath.    Cardiovascular: Negative for chest pain.   Gastrointestinal: Negative for abdominal pain.   Endocrine: Positive for polydipsia, polyphagia and polyuria.   Genitourinary: Positive for frequency and nocturia. Negative for decreased urine volume, difficulty urinating, dysuria, pelvic pain and urgency.   Integumentary:  Negative for rash.        + urticaria    Neurological: Negative for light-headedness.         Objective:      Physical Exam  Constitutional:       General: She is not in acute distress.     Appearance: Normal appearance. She is not ill-appearing.   HENT:      Head: Normocephalic.   Cardiovascular:      Rate and Rhythm: Normal rate and regular rhythm.      Heart sounds: Normal heart sounds.   Pulmonary:      Effort: Pulmonary effort is normal. No respiratory distress.      Breath sounds: Normal breath sounds.   Abdominal:      Palpations: Abdomen is soft.      Tenderness: There is no abdominal tenderness.   Skin:     General: Skin is warm.   Neurological:      Mental Status: She is alert.   Psychiatric:         Mood and Affect: Mood normal.         Behavior: Behavior normal.          Assessment:       Problem List Items Addressed This Visit     Hypothyroid    Relevant Orders    TSH (Completed)    T4, FREE (Completed)      Other Visit Diagnoses     Polyuria    -  Primary    Relevant Orders    POCT URINE DIPSTICK WITHOUT MICROSCOPE (Completed)    Urinalysis (Completed)    Urine culture    Hemoglobin A1C (Completed)    Impaired fasting glucose         Relevant Orders    Hemoglobin A1C (Completed)    Urticaria        Relevant Orders    Comprehensive Metabolic Panel (Completed)    CBC Auto Differential (Completed)          Plan:        Kristin was seen today for urinary frequency, skin itching  and r/o dm .    Diagnoses and all orders for this visit:    Acute cystitis without hematuria  -     POCT URINE DIPSTICK WITHOUT MICROSCOPE  -     Urinalysis  -     Urine culture  -     Hemoglobin A1C; Future  -     macrobid sent    Impaired fasting glucose   -     Hemoglobin A1C; Future    Urticaria  -     Comprehensive Metabolic Panel; Future  -     CBC Auto Differential; Future  - Switch body wash to unscented Dove; use unscented moisturizing lotion (Eucerin)    Hypothyroidism, unspecified type  -     TSH; Future  -     T4, FREE; Future        I hereby acknowledge that I am relying upon documentation authored by a PA student working under my supervision and further I hereby attest that I have verified the student documentation or findings by personally re-performing the physical exam and medical decision making activities of the Evaluation and Management service to be billed.  Arlene Torres

## 2022-08-03 ENCOUNTER — TELEPHONE (OUTPATIENT)
Dept: FAMILY MEDICINE | Facility: CLINIC | Age: 77
End: 2022-08-03
Payer: MEDICARE

## 2022-08-03 RX ORDER — NITROFURANTOIN 25; 75 MG/1; MG/1
100 CAPSULE ORAL 2 TIMES DAILY
Qty: 14 CAPSULE | Refills: 0 | Status: SHIPPED | OUTPATIENT
Start: 2022-08-03 | End: 2022-08-05

## 2022-08-03 NOTE — TELEPHONE ENCOUNTER
----- Message from Marianne Manuel sent at 8/3/2022  4:44 PM CDT -----  Regarding: self  .Type:  Patient Returning Call    Who Called:  self    Who Left Message for Patient:  Arlene Torres     Does the patient know what this is regarding? Visit yesterday     Would the patient rather a call back or a response via [facility]? Call     Best Call Back Number.[phone]

## 2022-08-05 ENCOUNTER — TELEPHONE (OUTPATIENT)
Dept: FAMILY MEDICINE | Facility: CLINIC | Age: 77
End: 2022-08-05
Payer: MEDICARE

## 2022-08-05 DIAGNOSIS — N30.00 ACUTE CYSTITIS WITHOUT HEMATURIA: Primary | ICD-10-CM

## 2022-08-05 LAB — BACTERIA UR CULT: ABNORMAL

## 2022-08-05 RX ORDER — CIPROFLOXACIN 500 MG/1
500 TABLET ORAL 2 TIMES DAILY
Qty: 14 TABLET | Refills: 0 | Status: SHIPPED | OUTPATIENT
Start: 2022-08-05 | End: 2022-08-12

## 2022-08-12 ENCOUNTER — HOSPITAL ENCOUNTER (OUTPATIENT)
Dept: RADIOLOGY | Facility: HOSPITAL | Age: 77
Discharge: HOME | End: 2022-08-12
Attending: PHYSICIAN ASSISTANT
Payer: MEDICARE

## 2022-08-12 ENCOUNTER — OFFICE VISIT (OUTPATIENT)
Dept: ORTHOPEDICS | Facility: CLINIC | Age: 77
End: 2022-08-12
Payer: MEDICARE

## 2022-08-12 VITALS
HEIGHT: 61 IN | BODY MASS INDEX: 29.38 KG/M2 | WEIGHT: 155.63 LBS | DIASTOLIC BLOOD PRESSURE: 96 MMHG | SYSTOLIC BLOOD PRESSURE: 178 MMHG | HEART RATE: 94 BPM

## 2022-08-12 DIAGNOSIS — M79.642 HAND PAIN, LEFT: ICD-10-CM

## 2022-08-12 DIAGNOSIS — M25.532 LEFT WRIST PAIN: ICD-10-CM

## 2022-08-12 DIAGNOSIS — M25.531 RIGHT WRIST PAIN: ICD-10-CM

## 2022-08-12 DIAGNOSIS — M79.642 HAND PAIN, LEFT: Primary | ICD-10-CM

## 2022-08-12 PROCEDURE — 3080F DIAST BP >= 90 MM HG: CPT | Mod: CPTII,S$GLB,, | Performed by: PHYSICIAN ASSISTANT

## 2022-08-12 PROCEDURE — 99999 PR PBB SHADOW E&M-EST. PATIENT-LVL V: CPT | Mod: PBBFAC,,, | Performed by: PHYSICIAN ASSISTANT

## 2022-08-12 PROCEDURE — 73110 X-RAY EXAM OF WRIST: CPT | Mod: TC,RT

## 2022-08-12 PROCEDURE — 73110 XR WRIST COMPLETE 3 VIEWS RIGHT: ICD-10-PCS | Mod: 26,RT,, | Performed by: RADIOLOGY

## 2022-08-12 PROCEDURE — 1125F AMNT PAIN NOTED PAIN PRSNT: CPT | Mod: CPTII,S$GLB,, | Performed by: PHYSICIAN ASSISTANT

## 2022-08-12 PROCEDURE — 1160F RVW MEDS BY RX/DR IN RCRD: CPT | Mod: CPTII,S$GLB,, | Performed by: PHYSICIAN ASSISTANT

## 2022-08-12 PROCEDURE — 3044F HG A1C LEVEL LT 7.0%: CPT | Mod: CPTII,S$GLB,, | Performed by: PHYSICIAN ASSISTANT

## 2022-08-12 PROCEDURE — 73130 X-RAY EXAM OF HAND: CPT | Mod: TC,RT

## 2022-08-12 PROCEDURE — 73130 X-RAY EXAM OF HAND: CPT | Mod: 26,RT,, | Performed by: RADIOLOGY

## 2022-08-12 PROCEDURE — 1159F PR MEDICATION LIST DOCUMENTED IN MEDICAL RECORD: ICD-10-PCS | Mod: CPTII,S$GLB,, | Performed by: PHYSICIAN ASSISTANT

## 2022-08-12 PROCEDURE — 99999 PR PBB SHADOW E&M-EST. PATIENT-LVL V: ICD-10-PCS | Mod: PBBFAC,,, | Performed by: PHYSICIAN ASSISTANT

## 2022-08-12 PROCEDURE — 1159F MED LIST DOCD IN RCRD: CPT | Mod: CPTII,S$GLB,, | Performed by: PHYSICIAN ASSISTANT

## 2022-08-12 PROCEDURE — 99213 OFFICE O/P EST LOW 20 MIN: CPT | Mod: S$GLB,,, | Performed by: PHYSICIAN ASSISTANT

## 2022-08-12 PROCEDURE — 3044F PR MOST RECENT HEMOGLOBIN A1C LEVEL <7.0%: ICD-10-PCS | Mod: CPTII,S$GLB,, | Performed by: PHYSICIAN ASSISTANT

## 2022-08-12 PROCEDURE — 73110 X-RAY EXAM OF WRIST: CPT | Mod: 26,RT,, | Performed by: RADIOLOGY

## 2022-08-12 PROCEDURE — 3077F PR MOST RECENT SYSTOLIC BLOOD PRESSURE >= 140 MM HG: ICD-10-PCS | Mod: CPTII,S$GLB,, | Performed by: PHYSICIAN ASSISTANT

## 2022-08-12 PROCEDURE — 3080F PR MOST RECENT DIASTOLIC BLOOD PRESSURE >= 90 MM HG: ICD-10-PCS | Mod: CPTII,S$GLB,, | Performed by: PHYSICIAN ASSISTANT

## 2022-08-12 PROCEDURE — 3077F SYST BP >= 140 MM HG: CPT | Mod: CPTII,S$GLB,, | Performed by: PHYSICIAN ASSISTANT

## 2022-08-12 PROCEDURE — 1125F PR PAIN SEVERITY QUANTIFIED, PAIN PRESENT: ICD-10-PCS | Mod: CPTII,S$GLB,, | Performed by: PHYSICIAN ASSISTANT

## 2022-08-12 PROCEDURE — 73130 XR HAND COMPLETE 3 VIEW RIGHT: ICD-10-PCS | Mod: 26,RT,, | Performed by: RADIOLOGY

## 2022-08-12 PROCEDURE — 1160F PR REVIEW ALL MEDS BY PRESCRIBER/CLIN PHARMACIST DOCUMENTED: ICD-10-PCS | Mod: CPTII,S$GLB,, | Performed by: PHYSICIAN ASSISTANT

## 2022-08-12 PROCEDURE — 99213 PR OFFICE/OUTPT VISIT, EST, LEVL III, 20-29 MIN: ICD-10-PCS | Mod: S$GLB,,, | Performed by: PHYSICIAN ASSISTANT
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
## 2022-08-19 ENCOUNTER — OFFICE VISIT (OUTPATIENT)
Dept: ORTHOPEDICS | Facility: CLINIC | Age: 77
End: 2022-08-19
Payer: MEDICARE

## 2022-08-19 ENCOUNTER — HOSPITAL ENCOUNTER (OUTPATIENT)
Dept: RADIOLOGY | Facility: HOSPITAL | Age: 77
Discharge: HOME | End: 2022-08-19
Attending: PHYSICIAN ASSISTANT
Payer: MEDICARE

## 2022-08-19 VITALS — HEART RATE: 89 BPM | DIASTOLIC BLOOD PRESSURE: 87 MMHG | SYSTOLIC BLOOD PRESSURE: 184 MMHG

## 2022-08-19 DIAGNOSIS — M25.531 PAIN IN BOTH WRISTS: ICD-10-CM

## 2022-08-19 DIAGNOSIS — M25.532 PAIN IN BOTH WRISTS: ICD-10-CM

## 2022-08-19 DIAGNOSIS — M19.032 PRIMARY OSTEOARTHRITIS OF BOTH WRISTS: Primary | ICD-10-CM

## 2022-08-19 DIAGNOSIS — M25.532 LEFT WRIST PAIN: ICD-10-CM

## 2022-08-19 DIAGNOSIS — M25.531 RIGHT WRIST PAIN: ICD-10-CM

## 2022-08-19 DIAGNOSIS — M19.031 PRIMARY OSTEOARTHRITIS OF BOTH WRISTS: Primary | ICD-10-CM

## 2022-08-19 PROCEDURE — 1100F PTFALLS ASSESS-DOCD GE2>/YR: CPT | Mod: CPTII,S$GLB,, | Performed by: PHYSICIAN ASSISTANT

## 2022-08-19 PROCEDURE — 3079F PR MOST RECENT DIASTOLIC BLOOD PRESSURE 80-89 MM HG: ICD-10-PCS | Mod: CPTII,S$GLB,, | Performed by: PHYSICIAN ASSISTANT

## 2022-08-19 PROCEDURE — 99999 PR PBB SHADOW E&M-EST. PATIENT-LVL IV: CPT | Mod: PBBFAC,,, | Performed by: PHYSICIAN ASSISTANT

## 2022-08-19 PROCEDURE — 3044F PR MOST RECENT HEMOGLOBIN A1C LEVEL <7.0%: ICD-10-PCS | Mod: CPTII,S$GLB,, | Performed by: PHYSICIAN ASSISTANT

## 2022-08-19 PROCEDURE — 1125F AMNT PAIN NOTED PAIN PRSNT: CPT | Mod: CPTII,S$GLB,, | Performed by: PHYSICIAN ASSISTANT

## 2022-08-19 PROCEDURE — 99213 OFFICE O/P EST LOW 20 MIN: CPT | Mod: S$GLB,,, | Performed by: PHYSICIAN ASSISTANT

## 2022-08-19 PROCEDURE — 1160F RVW MEDS BY RX/DR IN RCRD: CPT | Mod: CPTII,S$GLB,, | Performed by: PHYSICIAN ASSISTANT

## 2022-08-19 PROCEDURE — 99213 PR OFFICE/OUTPT VISIT, EST, LEVL III, 20-29 MIN: ICD-10-PCS | Mod: S$GLB,,, | Performed by: PHYSICIAN ASSISTANT

## 2022-08-19 PROCEDURE — 3044F HG A1C LEVEL LT 7.0%: CPT | Mod: CPTII,S$GLB,, | Performed by: PHYSICIAN ASSISTANT

## 2022-08-19 PROCEDURE — 99999 PR PBB SHADOW E&M-EST. PATIENT-LVL IV: ICD-10-PCS | Mod: PBBFAC,,, | Performed by: PHYSICIAN ASSISTANT

## 2022-08-19 PROCEDURE — 1100F PR PT FALLS ASSESS DOC 2+ FALLS/FALL W/INJURY/YR: ICD-10-PCS | Mod: CPTII,S$GLB,, | Performed by: PHYSICIAN ASSISTANT

## 2022-08-19 PROCEDURE — 1125F PR PAIN SEVERITY QUANTIFIED, PAIN PRESENT: ICD-10-PCS | Mod: CPTII,S$GLB,, | Performed by: PHYSICIAN ASSISTANT

## 2022-08-19 PROCEDURE — 73110 X-RAY EXAM OF WRIST: CPT | Mod: 26,50,, | Performed by: RADIOLOGY

## 2022-08-19 PROCEDURE — 1160F PR REVIEW ALL MEDS BY PRESCRIBER/CLIN PHARMACIST DOCUMENTED: ICD-10-PCS | Mod: CPTII,S$GLB,, | Performed by: PHYSICIAN ASSISTANT

## 2022-08-19 PROCEDURE — 3288F FALL RISK ASSESSMENT DOCD: CPT | Mod: CPTII,S$GLB,, | Performed by: PHYSICIAN ASSISTANT

## 2022-08-19 PROCEDURE — 3077F PR MOST RECENT SYSTOLIC BLOOD PRESSURE >= 140 MM HG: ICD-10-PCS | Mod: CPTII,S$GLB,, | Performed by: PHYSICIAN ASSISTANT

## 2022-08-19 PROCEDURE — 73110 XR WRIST COMPLETE 3 VIEWS BILATERAL: ICD-10-PCS | Mod: 26,50,, | Performed by: RADIOLOGY

## 2022-08-19 PROCEDURE — 73110 X-RAY EXAM OF WRIST: CPT | Mod: TC,50

## 2022-08-19 PROCEDURE — 1159F MED LIST DOCD IN RCRD: CPT | Mod: CPTII,S$GLB,, | Performed by: PHYSICIAN ASSISTANT

## 2022-08-19 PROCEDURE — 1159F PR MEDICATION LIST DOCUMENTED IN MEDICAL RECORD: ICD-10-PCS | Mod: CPTII,S$GLB,, | Performed by: PHYSICIAN ASSISTANT

## 2022-08-19 PROCEDURE — 3288F PR FALLS RISK ASSESSMENT DOCUMENTED: ICD-10-PCS | Mod: CPTII,S$GLB,, | Performed by: PHYSICIAN ASSISTANT

## 2022-08-19 PROCEDURE — 3079F DIAST BP 80-89 MM HG: CPT | Mod: CPTII,S$GLB,, | Performed by: PHYSICIAN ASSISTANT

## 2022-08-19 PROCEDURE — 3077F SYST BP >= 140 MM HG: CPT | Mod: CPTII,S$GLB,, | Performed by: PHYSICIAN ASSISTANT

## 2022-08-19 RX ORDER — DICLOFENAC SODIUM 10 MG/G
2 GEL TOPICAL 4 TIMES DAILY
Qty: 400 G | Refills: 0 | Status: ON HOLD | OUTPATIENT
Start: 2022-08-19 | End: 2023-01-31 | Stop reason: HOSPADM
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
## 2022-09-08 PROBLEM — M25.532 PAIN IN BOTH WRISTS: Status: ACTIVE | Noted: 2022-09-08

## 2022-09-08 PROBLEM — M25.531 PAIN IN BOTH WRISTS: Status: ACTIVE | Noted: 2022-09-08

## 2022-09-12 ENCOUNTER — CLINICAL SUPPORT (OUTPATIENT)
Dept: REHABILITATION | Facility: HOSPITAL | Age: 77
End: 2022-09-12
Attending: PHYSICIAN ASSISTANT
Payer: MEDICARE

## 2022-09-12 DIAGNOSIS — M25.532 PAIN IN BOTH WRISTS: ICD-10-CM

## 2022-09-12 DIAGNOSIS — M19.031 PRIMARY OSTEOARTHRITIS OF BOTH WRISTS: ICD-10-CM

## 2022-09-12 DIAGNOSIS — M25.632 DECREASED RANGE OF MOTION OF BOTH WRISTS: ICD-10-CM

## 2022-09-12 DIAGNOSIS — M19.032 PRIMARY OSTEOARTHRITIS OF BOTH WRISTS: ICD-10-CM

## 2022-09-12 DIAGNOSIS — M25.531 PAIN IN BOTH WRISTS: ICD-10-CM

## 2022-09-12 DIAGNOSIS — M25.631 DECREASED RANGE OF MOTION OF BOTH WRISTS: ICD-10-CM

## 2022-09-12 DIAGNOSIS — R29.898 DECREASED GRIP STRENGTH: ICD-10-CM

## 2022-09-12 DIAGNOSIS — Z78.9 ALTERATION IN INSTRUMENTAL ACTIVITIES OF DAILY LIVING (IADL): ICD-10-CM

## 2022-09-12 PROCEDURE — 97166 OT EVAL MOD COMPLEX 45 MIN: CPT | Mod: PN

## 2022-09-12 NOTE — PATIENT INSTRUCTIONS
Pt instructed on the following:  - Wear wrist immobilization brace during A.M. hours, to be removed every 2-3 hours for gentle wrist ROM  - Avoid lifting objects >2 lbs at this time
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
## 2022-09-13 NOTE — PROGRESS NOTES
"     Outpatient Occupational Therapy Daily Treatment Note       Date: 9/16/2022  Name: Kristin Goodman  Clinic Number: [id number]    Therapy Diagnosis:   Encounter Diagnoses   Name Primary?    Decreased range of motion of both wrists Yes    Decreased  strength     Alteration in instrumental activities of daily living (IADL)      Physician: Donny Sheldon PA*    Physician Orders: Eval and treat   Medical Diagnosis: M25.531,M25.532 (ICD-10-CM) - Pain in both wrists M19.031,M19.032 (ICD-10-CM)  DOI:  FOOSH injury to B wrist/hand approx. 4 weeks ago   Insurance Authorization Period Expiration: 10/10/22  Plan of Care Certification Period: 9/12/22-11/12/22  Date of Return to MD: 9/9/22  Date of Evaluation: 9/12/22  Progress Note Due: 10/12/22  FOTO: Initial evaluation     Visit # / Visits authorized:  1/16 visits on POC (excluding evaluation)  / 1/12 visits authorized   Time In:12:15  Time Out: 12:55  Total Billable Time: 40 minutes    Precautions:  Standard      Subjective     Pt reports, "I think I did too much at work yesterday- it was swollen ."     Functional Change: N/A    Pt was not compliant with home exercise program given last session.   Response to previous treatment:favorable     Pain: pre-session:2/10 ; post session: 0/10   Location: right dorsal MP's       Objective   Pt received the following supervised modalities after being cleared for contradictions for 8 minutes:   -Moist heat application to hand for 8 minutes to facilitate tissue extensibility & ROM prior to therex      Pt received the following direct contact modalities after being cleared for contraindications for 8 minutes:  -Utrasound for scar tissue remodeling, increased circulation, & tissue elasticity @ 100% duty cycle, 3.3 Mhz, applied to R dorsal palm/MPs/wrist, intensity = 0.6 w/cm2       Therapist performed the following manual therapy techniques to increase joint mobilization and soft tissue mobilization for 24 minutes:   -Soft tissue " massage (STM) and myofascial release (MFR) to R wrist/hand to increase joint mobility, ROM and for pain management.       Home Exercises and Education Provided     Education provided:  - Discussed insurance limitation  - Progress towards goals  - Pt instructed on importance of compliance w/ bracing and HEP       Written Home Exercises Provided: yes.  Exercises were reviewed and Kristin was able to demonstrate them prior to the end of the session.  Kristin demonstrated good  understanding of the HEP provided.     See EMR under Patient Instructions for exercises provided  9/16/22 .       Assessment     Pt tolerated session well, noting reports of decreased ulnar wrist pain with resting from heavy lifting and repetitive tasks, however, increasing after increased strenuous use of her R hand during work-related tasks. Pt would continue to benefit from skilled Occupational Therapy services.   Kristin is progressing well towards her goals and there are no updates to goals at this time. Pt prognosis is Good.     Pt demonstrated proper understanding of each exercise. Pt continues to be limited in functional and leisurely pursuits. Pain, decreased wrist ROM, and decreased /pinch strength limits Pts participation in ADL's and IADLs. Pt is not able to carryout necessary vocational tasks. Pt continues to requires cues and skilled supervision to complete HEP.       Anticipated barriers to Occupational Therapy: None noted       Pt's spiritual, cultural and educational needs considered and Pt agreeable to Plan of Care and goals.       ShortTerm Goals (to be met in 3 weeks or by 10/3/22):   1. Patient to be IND and compliant with HEP & attendance for duration of therapy.  2. Patient will report no more than 3/10 pain in B wrists/hands.         Long Term Goals (to be met in 6 weeks or by DC):   1. Patient to be IND and compliant with HEP & attendance for duration of therapy.  2. Patient will demonstrate increased AROM for B wrist  planes of motion by 10-15 degrees to increase functional hand use for ADL performance.   3. Patient will demonstrate increased B hand  strength by 7-10 lbs. to restore functional grasp for ADLs/work/leisure activities.   4. Patient will demonstrate increased R pinches (3 positions) by 2-3 lbs to restore IND with fine motor activities.  5. Patient will report increase in functional use of her B hands by 20% as evidenced by the FOTO outcome measure.   6. Patient will report no more than 1/10 pain in B wrists/hands.      Plan     Krisitn Goodman is to be treated by Occupational Therapy 1-2 times per week for 8 weeks, or 8-16 visits, during the certification period from 9/12/22 to 11/12/22, to achieve the established goals.        Updates/Grading for next session: TBD pending progress       Thank you for allowing me to assist in the care of your Patient. If you have any questions or concerns, please don't hesitate to e-mail or contact me directly.      ANGIE Toussaint MOT  [facility]   Phone: [phone]  Fax: [phone]

## 2022-09-16 ENCOUNTER — CLINICAL SUPPORT (OUTPATIENT)
Dept: REHABILITATION | Facility: HOSPITAL | Age: 77
End: 2022-09-16
Attending: PHYSICIAN ASSISTANT
Payer: MEDICARE

## 2022-09-16 ENCOUNTER — DOCUMENTATION ONLY (OUTPATIENT)
Dept: REHABILITATION | Facility: HOSPITAL | Age: 77
End: 2022-09-16

## 2022-09-16 DIAGNOSIS — M25.632 DECREASED RANGE OF MOTION OF BOTH WRISTS: Primary | ICD-10-CM

## 2022-09-16 DIAGNOSIS — Z78.9 ALTERATION IN INSTRUMENTAL ACTIVITIES OF DAILY LIVING (IADL): ICD-10-CM

## 2022-09-16 DIAGNOSIS — R29.898 DECREASED GRIP STRENGTH: ICD-10-CM

## 2022-09-16 DIAGNOSIS — M25.631 DECREASED RANGE OF MOTION OF BOTH WRISTS: Primary | ICD-10-CM

## 2022-09-16 PROCEDURE — 97140 MANUAL THERAPY 1/> REGIONS: CPT | Mod: PN

## 2022-09-16 PROCEDURE — 97010 HOT OR COLD PACKS THERAPY: CPT | Mod: PN

## 2022-09-16 PROCEDURE — 97035 APP MDLTY 1+ULTRASOUND EA 15: CPT | Mod: PN

## 2022-09-16 NOTE — PROGRESS NOTES
Occupational Therapy No Show Documentation     Name: Kristin Goodman  Date: 09/16/2022  Medical Diagnosis:   Encounter Diagnoses   Name Primary?    Decreased range of motion of both wrists Yes    Decreased  strength     Alteration in instrumental activities of daily living (IADL)      Referring Physician: Donny Sheldon PA*    Patient (Pt) did not attend previously scheduled OT visit evaluation today. Therapist left voicemail for Pt requesting if she would like to re-schedule. No charges have been posted today.     1 No show   1 Same day cancellations     ANGIE Toussaint MOT  [facility]   Phone: [phone]

## 2022-09-24 ENCOUNTER — HOSPITAL ENCOUNTER (EMERGENCY)
Facility: HOSPITAL | Age: 77
Discharge: HOME | End: 2022-09-24
Attending: EMERGENCY MEDICINE
Payer: MEDICARE

## 2022-09-24 VITALS
HEART RATE: 76 BPM | OXYGEN SATURATION: 98 % | SYSTOLIC BLOOD PRESSURE: 163 MMHG | WEIGHT: 153 LBS | DIASTOLIC BLOOD PRESSURE: 65 MMHG | HEIGHT: 61 IN | TEMPERATURE: 98 F | RESPIRATION RATE: 18 BRPM | BODY MASS INDEX: 28.89 KG/M2

## 2022-09-24 DIAGNOSIS — R31.9 URINARY TRACT INFECTION WITH HEMATURIA, SITE UNSPECIFIED: ICD-10-CM

## 2022-09-24 DIAGNOSIS — N39.0 URINARY TRACT INFECTION WITH HEMATURIA, SITE UNSPECIFIED: ICD-10-CM

## 2022-09-24 DIAGNOSIS — R06.09 DOE (DYSPNEA ON EXERTION): ICD-10-CM

## 2022-09-24 DIAGNOSIS — J18.9 PNEUMONIA OF BOTH LUNGS DUE TO INFECTIOUS ORGANISM, UNSPECIFIED PART OF LUNG: Primary | ICD-10-CM

## 2022-09-24 DIAGNOSIS — I10 HTN (HYPERTENSION): ICD-10-CM

## 2022-09-24 LAB
ABO + RH BLD: NORMAL
ALBUMIN SERPL BCP-MCNC: 3.3 G/DL (ref 3.5–5.2)
ALP SERPL-CCNC: 64 U/L (ref 55–135)
ALT SERPL W/O P-5'-P-CCNC: 19 U/L (ref 10–44)
ANION GAP SERPL CALC-SCNC: 10 MMOL/L (ref 8–16)
APTT BLDCRRT: 21.7 SEC (ref 21–32)
AST SERPL-CCNC: 35 U/L (ref 10–40)
BACTERIA #/AREA URNS HPF: ABNORMAL /HPF
BASOPHILS # BLD AUTO: 0.06 K/UL (ref 0–0.2)
BASOPHILS NFR BLD: 0.6 % (ref 0–1.9)
BILIRUB SERPL-MCNC: 0.4 MG/DL (ref 0.1–1)
BILIRUB UR QL STRIP: NEGATIVE
BLD GP AB SCN CELLS X3 SERPL QL: NORMAL
BNP SERPL-MCNC: 109 PG/ML (ref 0–99)
BUN SERPL-MCNC: 20 MG/DL (ref 8–23)
CALCIUM SERPL-MCNC: 9.2 MG/DL (ref 8.7–10.5)
CHLORIDE SERPL-SCNC: 106 MMOL/L (ref 95–110)
CLARITY UR: ABNORMAL
CO2 SERPL-SCNC: 24 MMOL/L (ref 23–29)
COLOR UR: YELLOW
CREAT SERPL-MCNC: 1.2 MG/DL (ref 0.5–1.4)
CTP QC/QA: YES
CTP QC/QA: YES
DIFFERENTIAL METHOD: ABNORMAL
EOSINOPHIL # BLD AUTO: 0.3 K/UL (ref 0–0.5)
EOSINOPHIL NFR BLD: 3.2 % (ref 0–8)
ERYTHROCYTE [DISTWIDTH] IN BLOOD BY AUTOMATED COUNT: 13.7 % (ref 11.5–14.5)
EST. GFR  (NO RACE VARIABLE): 47 ML/MIN/1.73 M^2
GLUCOSE SERPL-MCNC: 94 MG/DL (ref 70–110)
GLUCOSE UR QL STRIP: NEGATIVE
HCT VFR BLD AUTO: 35.6 % (ref 37–48.5)
HGB BLD-MCNC: 11.7 G/DL (ref 12–16)
HGB UR QL STRIP: ABNORMAL
HYALINE CASTS #/AREA URNS LPF: 0 /LPF
IMM GRANULOCYTES # BLD AUTO: 0.02 K/UL (ref 0–0.04)
IMM GRANULOCYTES NFR BLD AUTO: 0.2 % (ref 0–0.5)
INR PPP: 1 (ref 0.8–1.2)
KETONES UR QL STRIP: NEGATIVE
LACTATE SERPL-SCNC: 0.7 MMOL/L (ref 0.5–2.2)
LEUKOCYTE ESTERASE UR QL STRIP: ABNORMAL
LYMPHOCYTES # BLD AUTO: 2.6 K/UL (ref 1–4.8)
LYMPHOCYTES NFR BLD: 26.5 % (ref 18–48)
MCH RBC QN AUTO: 28.3 PG (ref 27–31)
MCHC RBC AUTO-ENTMCNC: 32.9 G/DL (ref 32–36)
MCV RBC AUTO: 86 FL (ref 82–98)
MICROSCOPIC COMMENT: ABNORMAL
MONOCYTES # BLD AUTO: 0.8 K/UL (ref 0.3–1)
MONOCYTES NFR BLD: 8.3 % (ref 4–15)
NEUTROPHILS # BLD AUTO: 6 K/UL (ref 1.8–7.7)
NEUTROPHILS NFR BLD: 61.2 % (ref 38–73)
NITRITE UR QL STRIP: POSITIVE
NRBC BLD-RTO: 0 /100 WBC
PH UR STRIP: 6 [PH] (ref 5–8)
PLATELET # BLD AUTO: 372 K/UL (ref 150–450)
PMV BLD AUTO: 10.3 FL (ref 9.2–12.9)
POC MOLECULAR INFLUENZA A AGN: NEGATIVE
POC MOLECULAR INFLUENZA B AGN: NEGATIVE
POTASSIUM SERPL-SCNC: 4.1 MMOL/L (ref 3.5–5.1)
PROCALCITONIN SERPL IA-MCNC: 0.06 NG/ML
PROT SERPL-MCNC: 8.1 G/DL (ref 6–8.4)
PROT UR QL STRIP: ABNORMAL
PROTHROMBIN TIME: 10.5 SEC (ref 9–12.5)
RBC # BLD AUTO: 4.13 M/UL (ref 4–5.4)
RBC #/AREA URNS HPF: >100 /HPF (ref 0–4)
SARS-COV-2 RDRP RESP QL NAA+PROBE: NEGATIVE
SODIUM SERPL-SCNC: 140 MMOL/L (ref 136–145)
SP GR UR STRIP: 1.01 (ref 1–1.03)
TROPONIN I SERPL DL<=0.01 NG/ML-MCNC: 0.01 NG/ML (ref 0–0.03)
URN SPEC COLLECT METH UR: ABNORMAL
UROBILINOGEN UR STRIP-ACNC: NEGATIVE EU/DL
WBC # BLD AUTO: 9.75 K/UL (ref 3.9–12.7)
WBC #/AREA URNS HPF: 99 /HPF (ref 0–5)
WBC CLUMPS URNS QL MICRO: ABNORMAL

## 2022-09-24 PROCEDURE — 25500020 PHARM REV CODE 255: Performed by: EMERGENCY MEDICINE

## 2022-09-24 PROCEDURE — 87088 URINE BACTERIA CULTURE: CPT | Performed by: EMERGENCY MEDICINE

## 2022-09-24 PROCEDURE — 85730 THROMBOPLASTIN TIME PARTIAL: CPT | Performed by: EMERGENCY MEDICINE

## 2022-09-24 PROCEDURE — 93010 EKG 12-LEAD: ICD-10-PCS | Mod: ,,, | Performed by: INTERNAL MEDICINE

## 2022-09-24 PROCEDURE — 86850 RBC ANTIBODY SCREEN: CPT | Performed by: EMERGENCY MEDICINE

## 2022-09-24 PROCEDURE — 63600175 PHARM REV CODE 636 W HCPCS: Performed by: EMERGENCY MEDICINE

## 2022-09-24 PROCEDURE — 93005 ELECTROCARDIOGRAM TRACING: CPT

## 2022-09-24 PROCEDURE — 93010 ELECTROCARDIOGRAM REPORT: CPT | Mod: ,,, | Performed by: INTERNAL MEDICINE

## 2022-09-24 PROCEDURE — 96368 THER/DIAG CONCURRENT INF: CPT

## 2022-09-24 PROCEDURE — 85025 COMPLETE CBC W/AUTO DIFF WBC: CPT | Performed by: EMERGENCY MEDICINE

## 2022-09-24 PROCEDURE — 84145 PROCALCITONIN (PCT): CPT | Performed by: EMERGENCY MEDICINE

## 2022-09-24 PROCEDURE — 85610 PROTHROMBIN TIME: CPT | Performed by: EMERGENCY MEDICINE

## 2022-09-24 PROCEDURE — 87186 SC STD MICRODIL/AGAR DIL: CPT | Performed by: EMERGENCY MEDICINE

## 2022-09-24 PROCEDURE — U0002 COVID-19 LAB TEST NON-CDC: HCPCS | Performed by: EMERGENCY MEDICINE

## 2022-09-24 PROCEDURE — 87502 INFLUENZA DNA AMP PROBE: CPT

## 2022-09-24 PROCEDURE — 80053 COMPREHEN METABOLIC PANEL: CPT | Performed by: EMERGENCY MEDICINE

## 2022-09-24 PROCEDURE — 25000003 PHARM REV CODE 250: Performed by: EMERGENCY MEDICINE

## 2022-09-24 PROCEDURE — 81000 URINALYSIS NONAUTO W/SCOPE: CPT | Performed by: EMERGENCY MEDICINE

## 2022-09-24 PROCEDURE — 87077 CULTURE AEROBIC IDENTIFY: CPT | Performed by: EMERGENCY MEDICINE

## 2022-09-24 PROCEDURE — 83605 ASSAY OF LACTIC ACID: CPT | Performed by: EMERGENCY MEDICINE

## 2022-09-24 PROCEDURE — 87086 URINE CULTURE/COLONY COUNT: CPT | Performed by: EMERGENCY MEDICINE

## 2022-09-24 PROCEDURE — 96365 THER/PROPH/DIAG IV INF INIT: CPT

## 2022-09-24 PROCEDURE — 83880 ASSAY OF NATRIURETIC PEPTIDE: CPT | Performed by: EMERGENCY MEDICINE

## 2022-09-24 PROCEDURE — 87040 BLOOD CULTURE FOR BACTERIA: CPT | Mod: 59 | Performed by: EMERGENCY MEDICINE

## 2022-09-24 PROCEDURE — 99285 EMERGENCY DEPT VISIT HI MDM: CPT | Mod: 25

## 2022-09-24 PROCEDURE — 96366 THER/PROPH/DIAG IV INF ADDON: CPT

## 2022-09-24 PROCEDURE — 84484 ASSAY OF TROPONIN QUANT: CPT | Performed by: EMERGENCY MEDICINE

## 2022-09-24 RX ORDER — LEVOFLOXACIN 750 MG/1
750 TABLET ORAL DAILY
Qty: 10 TABLET | Refills: 0 | Status: SHIPPED | OUTPATIENT
Start: 2022-09-24 | End: 2022-10-04

## 2022-09-24 RX ORDER — ALBUTEROL SULFATE 90 UG/1
2 AEROSOL, METERED RESPIRATORY (INHALATION) EVERY 6 HOURS PRN
Qty: 18 G | Refills: 0 | Status: SHIPPED | OUTPATIENT
Start: 2022-09-24 | End: 2022-09-24 | Stop reason: SDUPTHER

## 2022-09-24 RX ORDER — LEVOFLOXACIN 750 MG/1
750 TABLET ORAL
Status: COMPLETED | OUTPATIENT
Start: 2022-09-24 | End: 2022-09-24

## 2022-09-24 RX ORDER — LEVOFLOXACIN 750 MG/1
750 TABLET ORAL DAILY
Qty: 5 TABLET | Refills: 0 | Status: SHIPPED | OUTPATIENT
Start: 2022-09-24 | End: 2022-09-24 | Stop reason: SDUPTHER

## 2022-09-24 RX ORDER — LEVOFLOXACIN 500 MG/1
500 TABLET, FILM COATED ORAL
Status: DISCONTINUED | OUTPATIENT
Start: 2022-09-24 | End: 2022-09-24

## 2022-09-24 RX ORDER — CLONIDINE HYDROCHLORIDE 0.1 MG/1
0.1 TABLET ORAL
Status: COMPLETED | OUTPATIENT
Start: 2022-09-24 | End: 2022-09-24

## 2022-09-24 RX ORDER — ALBUTEROL SULFATE 90 UG/1
2 AEROSOL, METERED RESPIRATORY (INHALATION) EVERY 6 HOURS PRN
Qty: 18 G | Refills: 0 | Status: ON HOLD | OUTPATIENT
Start: 2022-09-24 | End: 2023-08-09 | Stop reason: SDUPTHER

## 2022-09-24 RX ADMIN — AZITHROMYCIN MONOHYDRATE 500 MG: 500 INJECTION, POWDER, LYOPHILIZED, FOR SOLUTION INTRAVENOUS at 01:09

## 2022-09-24 RX ADMIN — LEVOFLOXACIN 750 MG: 750 TABLET, FILM COATED ORAL at 01:09

## 2022-09-24 RX ADMIN — IOHEXOL 75 ML: 350 INJECTION, SOLUTION INTRAVENOUS at 12:09

## 2022-09-24 RX ADMIN — CLONIDINE HYDROCHLORIDE 0.1 MG: 0.1 TABLET ORAL at 11:09

## 2022-09-24 RX ADMIN — CEFTRIAXONE 2 G: 2 INJECTION, SOLUTION INTRAVENOUS at 11:09

## 2022-09-24 NOTE — ED TRIAGE NOTES
Pt reports cough for about a week, this am started with hemoptysis.  Pt denies fever, chills.  PT does have HTN and did take her morning meds.  PT placed on a monitor
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
## 2022-09-24 NOTE — DISCHARGE INSTRUCTIONS
Thank you for coming to our Emergency Department today. It is important to remember that some problems are difficult to diagnose and may not be found during your first visit. Be sure to follow up with your primary care doctor and review any labs/imaging that was performed with them. If you do not have a primary care doctor, you may contact the one listed on your discharge paperwork or you may also call the [facility] Appointment Desk at [phone] to schedule an appointment with one.     All medications may potentially have side effects and it is impossible to predict which medications may give you side effects. If you feel that you are having a negative effect of any medication you should immediately stop taking them and seek medical attention.    Return to the ER with any questions/concerns, new/concerning symptoms, worsening or failure to improve. Do not drive or make any important decisions for 24 hours if you have received any pain medications, sedatives or mood altering drugs during your ER visit.

## 2022-09-26 LAB — BACTERIA UR CULT: ABNORMAL

## 2022-09-28 LAB
BACTERIA BLD CULT: NORMAL
BACTERIA BLD CULT: NORMAL

## 2022-10-04 ENCOUNTER — OFFICE VISIT (OUTPATIENT)
Dept: FAMILY MEDICINE | Facility: CLINIC | Age: 77
End: 2022-10-04
Payer: MEDICARE

## 2022-10-04 VITALS
DIASTOLIC BLOOD PRESSURE: 80 MMHG | WEIGHT: 149.94 LBS | HEIGHT: 61 IN | TEMPERATURE: 98 F | SYSTOLIC BLOOD PRESSURE: 138 MMHG | RESPIRATION RATE: 18 BRPM | OXYGEN SATURATION: 98 % | HEART RATE: 93 BPM | BODY MASS INDEX: 28.31 KG/M2

## 2022-10-04 DIAGNOSIS — J18.9 PNEUMONIA OF BOTH LUNGS DUE TO INFECTIOUS ORGANISM, UNSPECIFIED PART OF LUNG: ICD-10-CM

## 2022-10-04 DIAGNOSIS — Z23 FLU VACCINE NEED: ICD-10-CM

## 2022-10-04 PROCEDURE — 1126F AMNT PAIN NOTED NONE PRSNT: CPT | Mod: CPTII,S$GLB,, | Performed by: PHYSICIAN ASSISTANT

## 2022-10-04 PROCEDURE — 3044F HG A1C LEVEL LT 7.0%: CPT | Mod: CPTII,S$GLB,, | Performed by: PHYSICIAN ASSISTANT

## 2022-10-04 PROCEDURE — G0008 ADMIN INFLUENZA VIRUS VAC: HCPCS | Mod: S$GLB,,, | Performed by: PHYSICIAN ASSISTANT

## 2022-10-04 PROCEDURE — 1159F PR MEDICATION LIST DOCUMENTED IN MEDICAL RECORD: ICD-10-PCS | Mod: CPTII,S$GLB,, | Performed by: PHYSICIAN ASSISTANT

## 2022-10-04 PROCEDURE — 99214 PR OFFICE/OUTPT VISIT, EST, LEVL IV, 30-39 MIN: ICD-10-PCS | Mod: S$GLB,,, | Performed by: PHYSICIAN ASSISTANT

## 2022-10-04 PROCEDURE — 3079F PR MOST RECENT DIASTOLIC BLOOD PRESSURE 80-89 MM HG: ICD-10-PCS | Mod: CPTII,S$GLB,, | Performed by: PHYSICIAN ASSISTANT

## 2022-10-04 PROCEDURE — 3075F PR MOST RECENT SYSTOLIC BLOOD PRESS GE 130-139MM HG: ICD-10-PCS | Mod: CPTII,S$GLB,, | Performed by: PHYSICIAN ASSISTANT

## 2022-10-04 PROCEDURE — 3288F FALL RISK ASSESSMENT DOCD: CPT | Mod: CPTII,S$GLB,, | Performed by: PHYSICIAN ASSISTANT

## 2022-10-04 PROCEDURE — 1101F PR PT FALLS ASSESS DOC 0-1 FALLS W/OUT INJ PAST YR: ICD-10-PCS | Mod: CPTII,S$GLB,, | Performed by: PHYSICIAN ASSISTANT

## 2022-10-04 PROCEDURE — 90694 FLU VACCINE - QUADRIVALENT - ADJUVANTED: ICD-10-PCS | Mod: S$GLB,,, | Performed by: PHYSICIAN ASSISTANT

## 2022-10-04 PROCEDURE — 1101F PT FALLS ASSESS-DOCD LE1/YR: CPT | Mod: CPTII,S$GLB,, | Performed by: PHYSICIAN ASSISTANT

## 2022-10-04 PROCEDURE — 1159F MED LIST DOCD IN RCRD: CPT | Mod: CPTII,S$GLB,, | Performed by: PHYSICIAN ASSISTANT

## 2022-10-04 PROCEDURE — 1160F PR REVIEW ALL MEDS BY PRESCRIBER/CLIN PHARMACIST DOCUMENTED: ICD-10-PCS | Mod: CPTII,S$GLB,, | Performed by: PHYSICIAN ASSISTANT

## 2022-10-04 PROCEDURE — 3288F PR FALLS RISK ASSESSMENT DOCUMENTED: ICD-10-PCS | Mod: CPTII,S$GLB,, | Performed by: PHYSICIAN ASSISTANT

## 2022-10-04 PROCEDURE — 99214 OFFICE O/P EST MOD 30 MIN: CPT | Mod: S$GLB,,, | Performed by: PHYSICIAN ASSISTANT

## 2022-10-04 PROCEDURE — 1126F PR PAIN SEVERITY QUANTIFIED, NO PAIN PRESENT: ICD-10-PCS | Mod: CPTII,S$GLB,, | Performed by: PHYSICIAN ASSISTANT

## 2022-10-04 PROCEDURE — 3044F PR MOST RECENT HEMOGLOBIN A1C LEVEL <7.0%: ICD-10-PCS | Mod: CPTII,S$GLB,, | Performed by: PHYSICIAN ASSISTANT

## 2022-10-04 PROCEDURE — 1160F RVW MEDS BY RX/DR IN RCRD: CPT | Mod: CPTII,S$GLB,, | Performed by: PHYSICIAN ASSISTANT

## 2022-10-04 PROCEDURE — G0008 FLU VACCINE - QUADRIVALENT - ADJUVANTED: ICD-10-PCS | Mod: S$GLB,,, | Performed by: PHYSICIAN ASSISTANT

## 2022-10-04 PROCEDURE — 3079F DIAST BP 80-89 MM HG: CPT | Mod: CPTII,S$GLB,, | Performed by: PHYSICIAN ASSISTANT

## 2022-10-04 PROCEDURE — 90694 VACC AIIV4 NO PRSRV 0.5ML IM: CPT | Mod: S$GLB,,, | Performed by: PHYSICIAN ASSISTANT

## 2022-10-04 PROCEDURE — 3075F SYST BP GE 130 - 139MM HG: CPT | Mod: CPTII,S$GLB,, | Performed by: PHYSICIAN ASSISTANT

## 2022-10-04 PROCEDURE — 99999 PR PBB SHADOW E&M-EST. PATIENT-LVL V: ICD-10-PCS | Mod: PBBFAC,,, | Performed by: PHYSICIAN ASSISTANT

## 2022-10-04 PROCEDURE — 99999 PR PBB SHADOW E&M-EST. PATIENT-LVL V: CPT | Mod: PBBFAC,,, | Performed by: PHYSICIAN ASSISTANT

## 2022-10-04 RX ORDER — HYDROCODONE BITARTRATE AND ACETAMINOPHEN 5; 325 MG/1; MG/1
1 TABLET ORAL EVERY 6 HOURS PRN
Status: ON HOLD | COMMUNITY
Start: 2022-09-07 | End: 2023-02-03 | Stop reason: HOSPADM

## 2022-10-04 RX ORDER — IBUPROFEN 800 MG/1
800 TABLET ORAL EVERY 8 HOURS PRN
Status: ON HOLD | COMMUNITY
Start: 2022-09-07 | End: 2023-01-31 | Stop reason: HOSPADM

## 2022-10-04 NOTE — LETTER
October 4, 2022    Kristin P Rex  [address]  [place] [address]             [facility] [place] [place]  [address] [place] [address]  Phone: [phone]  Fax: [phone] Kristin Goodman was seen in my clinic on 10/4/22. Please excuse her absence.  She may return to work on 10/6/22.    If you have any questions or concerns, please don't hesitate to call.    Sincerely,       Arlene Torres PA-C

## 2022-10-04 NOTE — PROGRESS NOTES
Health Maintenance Due   Topic     High Dose Statin  Consult pcp      Shingles Vaccine (1 of 2)  hx chicken pox. Notified pt can get vaccine at pharmacy      COVID-19 Vaccine (4 - Booster for Moderna series)     Influenza Vaccine (1) Pt agree to get today

## 2022-10-04 NOTE — PROGRESS NOTES
Subjective:       Patient ID: Kristin Goodman is a 77 y.o. female.    Chief Complaint: Follow-up    HPI: 78 yo female presents for ER f/u for pneumonia. Presented to the ER on 9/24 after 3-4 days of cough. Coughed up blood which prompted the visit. CT showed bilateral pna. Received a rocephin injection and sent home with levaquin and albuterol. Also had hypertensive urgency. Given clonidine which helped. Pt states she feels about 90% better. Still walking slower but breathing is improving. Last time she used inhaler was last Sunday. No fever, cough resolved. BP running 130-140/80s at home. She is ready to return to work Thursday.    Review of Systems   Constitutional:  Negative for fever.   Respiratory:  Positive for shortness of breath (improving). Negative for cough.    Cardiovascular:  Negative for chest pain.       Objective:      Physical Exam  Constitutional:       Appearance: Normal appearance.   HENT:      Head: Normocephalic and atraumatic.   Cardiovascular:      Rate and Rhythm: Normal rate and regular rhythm.   Pulmonary:      Effort: Pulmonary effort is normal. No respiratory distress.      Breath sounds: Normal breath sounds. No stridor. No wheezing, rhonchi or rales.   Chest:      Chest wall: No tenderness.   Neurological:      Mental Status: She is alert.   Psychiatric:         Mood and Affect: Mood normal.       Assessment:       Problem List Items Addressed This Visit    None  Visit Diagnoses       Pneumonia of both lungs due to infectious organism, unspecified part of lung        Relevant Orders    X-Ray Chest PA And Lateral    Flu vaccine need        Relevant Orders    Influenza (FLUAD) - Quadrivalent (Adjuvanted) *Preferred* (65+) (PF) (Completed)            Plan:         Kristin was seen today for follow-up.    Diagnoses and all orders for this visit:    Pneumonia of both lungs due to infectious organism, unspecified part of lung        -     all labs and imaging reviewed from ER visit  -     X-Ray  Chest PA And Lateral; scheduled for November for follow up  -     pt is improving each day. Cleared to return to work.    Flu vaccine need  -     Influenza (FLUAD) - Quadrivalent (Adjuvanted) *Preferred* (65+) (PF)

## 2022-10-10 ENCOUNTER — DOCUMENTATION ONLY (OUTPATIENT)
Dept: REHABILITATION | Facility: HOSPITAL | Age: 77
End: 2022-10-10
Payer: MEDICARE

## 2022-10-10 DIAGNOSIS — M25.631 DECREASED RANGE OF MOTION OF BOTH WRISTS: Primary | ICD-10-CM

## 2022-10-10 DIAGNOSIS — Z78.9 ALTERATION IN INSTRUMENTAL ACTIVITIES OF DAILY LIVING (IADL): ICD-10-CM

## 2022-10-10 DIAGNOSIS — M25.632 DECREASED RANGE OF MOTION OF BOTH WRISTS: Primary | ICD-10-CM

## 2022-10-10 DIAGNOSIS — R29.898 DECREASED GRIP STRENGTH: ICD-10-CM

## 2022-10-10 NOTE — PROGRESS NOTES
"Occupational Therapy Cancellation Note    Name: Kristin Goodman  Date: 10/10/2022  Medical Diagnosis:   Encounter Diagnoses   Name Primary?    Decreased range of motion of both wrists Yes    Decreased  strength     Alteration in instrumental activities of daily living (IADL)      Referring Physician: Donny Sheldon PA*      Patient (Pt) did not attend previously scheduled OT visit today due to cancellation several hours prior to appointment at 10:00 via My Chart citing "Other". Will follow up next treatment day. No charges have been posted today.     0 No show   1 Same day cancellations     ANGIE Toussaint MOT  [facility]   Phone: [phone]      "

## 2022-10-17 ENCOUNTER — HOSPITAL ENCOUNTER (INPATIENT)
Facility: HOSPITAL | Age: 77
LOS: 57 days | Discharge: NURSING, REHAB OR LONG-TERM CARE | DRG: 871 | End: 2022-12-13
Attending: EMERGENCY MEDICINE | Admitting: EMERGENCY MEDICINE
Payer: MEDICARE

## 2022-10-17 DIAGNOSIS — Z91.89 AT RISK FOR LONG QT SYNDROME: ICD-10-CM

## 2022-10-17 DIAGNOSIS — K92.1 GASTROINTESTINAL HEMORRHAGE WITH MELENA: ICD-10-CM

## 2022-10-17 DIAGNOSIS — N17.9 ENCOUNTER FOR CONTINUOUS RENAL REPLACEMENT THERAPY (CRRT) FOR ACUTE RENAL FAILURE: ICD-10-CM

## 2022-10-17 DIAGNOSIS — N17.9 ACUTE RENAL FAILURE ON DIALYSIS: ICD-10-CM

## 2022-10-17 DIAGNOSIS — N17.9 ACUTE RENAL FAILURE SUPERIMPOSED ON STAGE 3 CHRONIC KIDNEY DISEASE, UNSPECIFIED ACUTE RENAL FAILURE TYPE, UNSPECIFIED WHETHER STAGE 3A OR 3B CKD: ICD-10-CM

## 2022-10-17 DIAGNOSIS — N05.7 PRIMARY PAUCI-IMMUNE NECROTIZING AND CRESCENTIC GLOMERULONEPHRITIS: ICD-10-CM

## 2022-10-17 DIAGNOSIS — R79.89 ELEVATED TROPONIN: ICD-10-CM

## 2022-10-17 DIAGNOSIS — R09.02 HYPOXIA: ICD-10-CM

## 2022-10-17 DIAGNOSIS — J18.9 PNEUMONIA OF BOTH UPPER LOBES DUE TO INFECTIOUS ORGANISM: ICD-10-CM

## 2022-10-17 DIAGNOSIS — Z79.899 HIGH RISK MEDICATIONS (NOT ANTICOAGULANTS) LONG-TERM USE: ICD-10-CM

## 2022-10-17 DIAGNOSIS — R42 DIZZY SPELLS: ICD-10-CM

## 2022-10-17 DIAGNOSIS — Z99.2 ACUTE RENAL FAILURE ON DIALYSIS: ICD-10-CM

## 2022-10-17 DIAGNOSIS — E87.5 HYPERKALEMIA: ICD-10-CM

## 2022-10-17 DIAGNOSIS — J96.01 SEPSIS WITH ACUTE HYPOXIC RESPIRATORY FAILURE WITHOUT SEPTIC SHOCK: ICD-10-CM

## 2022-10-17 DIAGNOSIS — R65.20 SEPSIS WITH ACUTE HYPOXIC RESPIRATORY FAILURE WITHOUT SEPTIC SHOCK: ICD-10-CM

## 2022-10-17 DIAGNOSIS — R06.02 SHORTNESS OF BREATH: ICD-10-CM

## 2022-10-17 DIAGNOSIS — J96.01 ACUTE HYPOXEMIC RESPIRATORY FAILURE: ICD-10-CM

## 2022-10-17 DIAGNOSIS — R07.9 CHEST PAIN: ICD-10-CM

## 2022-10-17 DIAGNOSIS — A41.9 SEPSIS DUE TO PNEUMONIA: ICD-10-CM

## 2022-10-17 DIAGNOSIS — A41.9 SEPSIS WITH ACUTE HYPOXIC RESPIRATORY FAILURE WITHOUT SEPTIC SHOCK: ICD-10-CM

## 2022-10-17 DIAGNOSIS — N18.30 ACUTE RENAL FAILURE SUPERIMPOSED ON STAGE 3 CHRONIC KIDNEY DISEASE, UNSPECIFIED ACUTE RENAL FAILURE TYPE, UNSPECIFIED WHETHER STAGE 3A OR 3B CKD: ICD-10-CM

## 2022-10-17 DIAGNOSIS — M31.7 MICROSCOPIC POLYANGIITIS: Primary | ICD-10-CM

## 2022-10-17 DIAGNOSIS — J18.9 SEPSIS DUE TO PNEUMONIA: ICD-10-CM

## 2022-10-17 DIAGNOSIS — N17.0 ACUTE RENAL FAILURE WITH TUBULAR NECROSIS: ICD-10-CM

## 2022-10-17 DIAGNOSIS — R30.0 DYSURIA: ICD-10-CM

## 2022-10-17 DIAGNOSIS — N05.8 PRIMARY PAUCI-IMMUNE NECROTIZING AND CRESCENTIC GLOMERULONEPHRITIS: ICD-10-CM

## 2022-10-17 DIAGNOSIS — R13.10 DYSPHAGIA, UNSPECIFIED TYPE: ICD-10-CM

## 2022-10-17 DIAGNOSIS — R34 ANURIA: ICD-10-CM

## 2022-10-17 PROBLEM — D62 ACUTE BLOOD LOSS ANEMIA: Status: ACTIVE | Noted: 2022-10-17

## 2022-10-17 LAB
ALBUMIN SERPL BCP-MCNC: 3 G/DL (ref 3.5–5.2)
ALP SERPL-CCNC: 59 U/L (ref 55–135)
ALT SERPL W/O P-5'-P-CCNC: 29 U/L (ref 10–44)
ANION GAP SERPL CALC-SCNC: 11 MMOL/L (ref 8–16)
ASCENDING AORTA: 2.34 CM
AST SERPL-CCNC: 56 U/L (ref 10–40)
AV INDEX (PROSTH): 0.7
AV MEAN GRADIENT: 13 MMHG
AV PEAK GRADIENT: 27 MMHG
AV VALVE AREA: 1.52 CM2
AV VELOCITY RATIO: 0.55
BACTERIA #/AREA URNS AUTO: ABNORMAL /HPF
BASOPHILS # BLD AUTO: 0.05 K/UL (ref 0–0.2)
BASOPHILS NFR BLD: 0.3 % (ref 0–1.9)
BILIRUB SERPL-MCNC: 0.9 MG/DL (ref 0.1–1)
BILIRUB UR QL STRIP: NEGATIVE
BNP SERPL-MCNC: 188 PG/ML (ref 0–99)
BSA FOR ECHO PROCEDURE: 1.65 M2
BUN SERPL-MCNC: 25 MG/DL (ref 8–23)
CALCIUM SERPL-MCNC: 9 MG/DL (ref 8.7–10.5)
CHLORIDE SERPL-SCNC: 100 MMOL/L (ref 95–110)
CLARITY UR REFRACT.AUTO: CLEAR
CO2 SERPL-SCNC: 24 MMOL/L (ref 23–29)
COLOR UR AUTO: YELLOW
CREAT SERPL-MCNC: 1.6 MG/DL (ref 0.5–1.4)
CV ECHO LV RWT: 0.32 CM
DIFFERENTIAL METHOD: ABNORMAL
DOP CALC AO PEAK VEL: 2.6 M/S
DOP CALC AO VTI: 39.34 CM
DOP CALC LVOT AREA: 2.2 CM2
DOP CALC LVOT DIAMETER: 1.66 CM
DOP CALC LVOT PEAK VEL: 1.44 M/S
DOP CALC LVOT STROKE VOLUME: 59.96 CM3
DOP CALCLVOT PEAK VEL VTI: 27.72 CM
E WAVE DECELERATION TIME: 180.62 MSEC
E/A RATIO: 0.68
E/E' RATIO: 14.8 M/S
ECHO LV POSTERIOR WALL: 0.66 CM (ref 0.6–1.1)
EJECTION FRACTION: 65 %
EOSINOPHIL # BLD AUTO: 0.1 K/UL (ref 0–0.5)
EOSINOPHIL NFR BLD: 0.4 % (ref 0–8)
ERYTHROCYTE [DISTWIDTH] IN BLOOD BY AUTOMATED COUNT: 13.8 % (ref 11.5–14.5)
EST. GFR  (NO RACE VARIABLE): 33.4 ML/MIN/1.73 M^2
FRACTIONAL SHORTENING: 46 % (ref 28–44)
GLUCOSE SERPL-MCNC: 92 MG/DL (ref 70–110)
GLUCOSE UR QL STRIP: NEGATIVE
HCT VFR BLD AUTO: 24.6 % (ref 37–48.5)
HCV AB SERPL QL IA: NORMAL
HGB BLD-MCNC: 7.7 G/DL (ref 12–16)
HGB UR QL STRIP: ABNORMAL
HIV 1+2 AB+HIV1 P24 AG SERPL QL IA: NORMAL
HYALINE CASTS UR QL AUTO: 0 /LPF
IMM GRANULOCYTES # BLD AUTO: 0.07 K/UL (ref 0–0.04)
IMM GRANULOCYTES NFR BLD AUTO: 0.4 % (ref 0–0.5)
INR PPP: 1.1 (ref 0.8–1.2)
INTERVENTRICULAR SEPTUM: 0.75 CM (ref 0.6–1.1)
KETONES UR QL STRIP: ABNORMAL
LA MAJOR: 4.83 CM
LA MINOR: 4.59 CM
LA WIDTH: 3.46 CM
LACTATE SERPL-SCNC: 0.9 MMOL/L (ref 0.5–2.2)
LEFT ATRIUM SIZE: 3.39 CM
LEFT ATRIUM VOLUME INDEX MOD: 25.6 ML/M2
LEFT ATRIUM VOLUME INDEX: 29 ML/M2
LEFT ATRIUM VOLUME MOD: 41.41 CM3
LEFT ATRIUM VOLUME: 46.93 CM3
LEFT INTERNAL DIMENSION IN SYSTOLE: 2.23 CM (ref 2.1–4)
LEFT VENTRICLE DIASTOLIC VOLUME INDEX: 45.85 ML/M2
LEFT VENTRICLE DIASTOLIC VOLUME: 74.28 ML
LEFT VENTRICLE MASS INDEX: 51 G/M2
LEFT VENTRICLE SYSTOLIC VOLUME INDEX: 10.3 ML/M2
LEFT VENTRICLE SYSTOLIC VOLUME: 16.69 ML
LEFT VENTRICULAR INTERNAL DIMENSION IN DIASTOLE: 4.1 CM (ref 3.5–6)
LEFT VENTRICULAR MASS: 82.44 G
LEUKOCYTE ESTERASE UR QL STRIP: ABNORMAL
LV LATERAL E/E' RATIO: 15.86 M/S
LV SEPTAL E/E' RATIO: 13.88 M/S
LYMPHOCYTES # BLD AUTO: 3 K/UL (ref 1–4.8)
LYMPHOCYTES NFR BLD: 18.7 % (ref 18–48)
MAGNESIUM SERPL-MCNC: 1.9 MG/DL (ref 1.6–2.6)
MCH RBC QN AUTO: 28 PG (ref 27–31)
MCHC RBC AUTO-ENTMCNC: 31.3 G/DL (ref 32–36)
MCV RBC AUTO: 90 FL (ref 82–98)
MICROSCOPIC COMMENT: ABNORMAL
MONOCYTES # BLD AUTO: 1.9 K/UL (ref 0.3–1)
MONOCYTES NFR BLD: 11.9 % (ref 4–15)
MV A" WAVE DURATION": 10.28 MSEC
MV PEAK A VEL: 1.63 M/S
MV PEAK E VEL: 1.11 M/S
MV STENOSIS PRESSURE HALF TIME: 52.38 MS
MV VALVE AREA P 1/2 METHOD: 4.2 CM2
NEUTROPHILS # BLD AUTO: 10.9 K/UL (ref 1.8–7.7)
NEUTROPHILS NFR BLD: 68.3 % (ref 38–73)
NITRITE UR QL STRIP: NEGATIVE
NRBC BLD-RTO: 0 /100 WBC
PH UR STRIP: 6 [PH] (ref 5–8)
PISA TR MAX VEL: 3.65 M/S
PLATELET # BLD AUTO: 397 K/UL (ref 150–450)
PMV BLD AUTO: 10.6 FL (ref 9.2–12.9)
POTASSIUM SERPL-SCNC: 3.3 MMOL/L (ref 3.5–5.1)
PROT SERPL-MCNC: 7.8 G/DL (ref 6–8.4)
PROT UR QL STRIP: ABNORMAL
PROTHROMBIN TIME: 11.4 SEC (ref 9–12.5)
PULM VEIN S/D RATIO: 2.1
PV PEAK D VEL: 0.39 M/S
PV PEAK S VEL: 0.82 M/S
RA MAJOR: 4 CM
RA PRESSURE: 3 MMHG
RA WIDTH: 3.35 CM
RBC # BLD AUTO: 2.75 M/UL (ref 4–5.4)
RBC #/AREA URNS AUTO: 99 /HPF (ref 0–4)
RIGHT VENTRICULAR END-DIASTOLIC DIMENSION: 2.91 CM
RV TISSUE DOPPLER FREE WALL SYSTOLIC VELOCITY 1 (APICAL 4 CHAMBER VIEW): 25.77 CM/S
SARS-COV-2 RDRP RESP QL NAA+PROBE: NEGATIVE
SINUS: 1.75 CM
SODIUM SERPL-SCNC: 135 MMOL/L (ref 136–145)
SP GR UR STRIP: 1.01 (ref 1–1.03)
SQUAMOUS #/AREA URNS AUTO: 1 /HPF
STJ: 2.01 CM
TDI LATERAL: 0.07 M/S
TDI SEPTAL: 0.08 M/S
TDI: 0.08 M/S
TR MAX PG: 53 MMHG
TRICUSPID ANNULAR PLANE SYSTOLIC EXCURSION: 2.85 CM
TROPONIN I SERPL DL<=0.01 NG/ML-MCNC: 0.05 NG/ML (ref 0–0.03)
TROPONIN I SERPL DL<=0.01 NG/ML-MCNC: 0.06 NG/ML (ref 0–0.03)
TV REST PULMONARY ARTERY PRESSURE: 56 MMHG
URN SPEC COLLECT METH UR: ABNORMAL
WBC # BLD AUTO: 16.03 K/UL (ref 3.9–12.7)
WBC #/AREA URNS AUTO: 16 /HPF (ref 0–5)
YEAST UR QL AUTO: ABNORMAL

## 2022-10-17 PROCEDURE — 83605 ASSAY OF LACTIC ACID: CPT | Performed by: EMERGENCY MEDICINE

## 2022-10-17 PROCEDURE — 93010 EKG 12-LEAD: ICD-10-PCS | Mod: ,,, | Performed by: INTERNAL MEDICINE

## 2022-10-17 PROCEDURE — 83735 ASSAY OF MAGNESIUM: CPT | Performed by: EMERGENCY MEDICINE

## 2022-10-17 PROCEDURE — 96365 THER/PROPH/DIAG IV INF INIT: CPT

## 2022-10-17 PROCEDURE — 25000003 PHARM REV CODE 250

## 2022-10-17 PROCEDURE — 96375 TX/PRO/DX INJ NEW DRUG ADDON: CPT | Mod: 59

## 2022-10-17 PROCEDURE — 63600175 PHARM REV CODE 636 W HCPCS

## 2022-10-17 PROCEDURE — 87040 BLOOD CULTURE FOR BACTERIA: CPT | Performed by: EMERGENCY MEDICINE

## 2022-10-17 PROCEDURE — 25000003 PHARM REV CODE 250: Performed by: NURSE PRACTITIONER

## 2022-10-17 PROCEDURE — 25000003 PHARM REV CODE 250: Performed by: EMERGENCY MEDICINE

## 2022-10-17 PROCEDURE — 63600175 PHARM REV CODE 636 W HCPCS: Performed by: EMERGENCY MEDICINE

## 2022-10-17 PROCEDURE — 84484 ASSAY OF TROPONIN QUANT: CPT | Mod: 91

## 2022-10-17 PROCEDURE — 20600001 HC STEP DOWN PRIVATE ROOM

## 2022-10-17 PROCEDURE — 99285 EMERGENCY DEPT VISIT HI MDM: CPT | Mod: 25

## 2022-10-17 PROCEDURE — 99285 PR EMERGENCY DEPT VISIT,LEVEL V: ICD-10-PCS | Mod: CS,,, | Performed by: EMERGENCY MEDICINE

## 2022-10-17 PROCEDURE — 36415 COLL VENOUS BLD VENIPUNCTURE: CPT

## 2022-10-17 PROCEDURE — 83880 ASSAY OF NATRIURETIC PEPTIDE: CPT | Performed by: EMERGENCY MEDICINE

## 2022-10-17 PROCEDURE — 99223 1ST HOSP IP/OBS HIGH 75: CPT | Mod: ,,,

## 2022-10-17 PROCEDURE — 84484 ASSAY OF TROPONIN QUANT: CPT | Performed by: EMERGENCY MEDICINE

## 2022-10-17 PROCEDURE — 85025 COMPLETE CBC W/AUTO DIFF WBC: CPT | Performed by: EMERGENCY MEDICINE

## 2022-10-17 PROCEDURE — 81001 URINALYSIS AUTO W/SCOPE: CPT

## 2022-10-17 PROCEDURE — 99285 EMERGENCY DEPT VISIT HI MDM: CPT | Mod: CS,,, | Performed by: EMERGENCY MEDICINE

## 2022-10-17 PROCEDURE — 87389 HIV-1 AG W/HIV-1&-2 AB AG IA: CPT | Performed by: PHYSICIAN ASSISTANT

## 2022-10-17 PROCEDURE — 86803 HEPATITIS C AB TEST: CPT | Performed by: PHYSICIAN ASSISTANT

## 2022-10-17 PROCEDURE — 85610 PROTHROMBIN TIME: CPT

## 2022-10-17 PROCEDURE — 87502 INFLUENZA DNA AMP PROBE: CPT | Performed by: EMERGENCY MEDICINE

## 2022-10-17 PROCEDURE — 93010 ELECTROCARDIOGRAM REPORT: CPT | Mod: ,,, | Performed by: INTERNAL MEDICINE

## 2022-10-17 PROCEDURE — 99223 PR INITIAL HOSPITAL CARE,LEVL III: ICD-10-PCS | Mod: ,,,

## 2022-10-17 PROCEDURE — 25500020 PHARM REV CODE 255: Performed by: EMERGENCY MEDICINE

## 2022-10-17 PROCEDURE — 93005 ELECTROCARDIOGRAM TRACING: CPT

## 2022-10-17 PROCEDURE — 87086 URINE CULTURE/COLONY COUNT: CPT

## 2022-10-17 PROCEDURE — 80053 COMPREHEN METABOLIC PANEL: CPT | Performed by: EMERGENCY MEDICINE

## 2022-10-17 PROCEDURE — U0002 COVID-19 LAB TEST NON-CDC: HCPCS | Performed by: EMERGENCY MEDICINE

## 2022-10-17 PROCEDURE — 87502 INFLUENZA DNA AMP PROBE: CPT | Performed by: INTERNAL MEDICINE

## 2022-10-17 RX ORDER — METOPROLOL TARTRATE 25 MG/1
12.5 TABLET ORAL ONCE
Status: DISCONTINUED | OUTPATIENT
Start: 2022-10-17 | End: 2022-10-17

## 2022-10-17 RX ORDER — BISACODYL 10 MG
10 SUPPOSITORY, RECTAL RECTAL DAILY PRN
Status: DISCONTINUED | OUTPATIENT
Start: 2022-10-17 | End: 2022-12-13 | Stop reason: HOSPADM

## 2022-10-17 RX ORDER — AMLODIPINE BESYLATE 10 MG/1
10 TABLET ORAL
Status: COMPLETED | OUTPATIENT
Start: 2022-10-17 | End: 2022-10-17

## 2022-10-17 RX ORDER — AMLODIPINE BESYLATE 10 MG/1
10 TABLET ORAL DAILY
Status: DISCONTINUED | OUTPATIENT
Start: 2022-10-18 | End: 2022-10-18

## 2022-10-17 RX ORDER — IBUPROFEN 200 MG
24 TABLET ORAL
Status: DISCONTINUED | OUTPATIENT
Start: 2022-10-17 | End: 2022-12-09

## 2022-10-17 RX ORDER — PROCHLORPERAZINE EDISYLATE 5 MG/ML
5 INJECTION INTRAMUSCULAR; INTRAVENOUS EVERY 6 HOURS PRN
Status: DISCONTINUED | OUTPATIENT
Start: 2022-10-17 | End: 2022-12-13 | Stop reason: HOSPADM

## 2022-10-17 RX ORDER — TALC
6 POWDER (GRAM) TOPICAL NIGHTLY PRN
Status: DISCONTINUED | OUTPATIENT
Start: 2022-10-17 | End: 2022-12-13 | Stop reason: HOSPADM

## 2022-10-17 RX ORDER — LEVOTHYROXINE SODIUM 25 UG/1
25 TABLET ORAL DAILY
Status: DISCONTINUED | OUTPATIENT
Start: 2022-10-18 | End: 2022-10-26

## 2022-10-17 RX ORDER — ONDANSETRON 8 MG/1
8 TABLET, ORALLY DISINTEGRATING ORAL EVERY 8 HOURS PRN
Status: DISCONTINUED | OUTPATIENT
Start: 2022-10-17 | End: 2022-11-05

## 2022-10-17 RX ORDER — POTASSIUM CHLORIDE 20 MEQ/1
40 TABLET, EXTENDED RELEASE ORAL ONCE
Status: COMPLETED | OUTPATIENT
Start: 2022-10-17 | End: 2022-10-17

## 2022-10-17 RX ORDER — HYDRALAZINE HYDROCHLORIDE 50 MG/1
100 TABLET, FILM COATED ORAL 3 TIMES DAILY
Status: DISCONTINUED | OUTPATIENT
Start: 2022-10-17 | End: 2022-10-18

## 2022-10-17 RX ORDER — NALOXONE HCL 0.4 MG/ML
0.02 VIAL (ML) INJECTION
Status: DISCONTINUED | OUTPATIENT
Start: 2022-10-17 | End: 2022-12-13 | Stop reason: HOSPADM

## 2022-10-17 RX ORDER — GLUCAGON 1 MG
1 KIT INJECTION
Status: DISCONTINUED | OUTPATIENT
Start: 2022-10-17 | End: 2022-10-24

## 2022-10-17 RX ORDER — METOPROLOL SUCCINATE 50 MG/1
50 TABLET, EXTENDED RELEASE ORAL DAILY
Status: DISCONTINUED | OUTPATIENT
Start: 2022-10-18 | End: 2022-10-18

## 2022-10-17 RX ORDER — SIMETHICONE 80 MG
1 TABLET,CHEWABLE ORAL 4 TIMES DAILY PRN
Status: DISCONTINUED | OUTPATIENT
Start: 2022-10-17 | End: 2022-12-13 | Stop reason: HOSPADM

## 2022-10-17 RX ORDER — MAG HYDROX/ALUMINUM HYD/SIMETH 200-200-20
30 SUSPENSION, ORAL (FINAL DOSE FORM) ORAL 4 TIMES DAILY PRN
Status: DISCONTINUED | OUTPATIENT
Start: 2022-10-17 | End: 2022-12-13 | Stop reason: HOSPADM

## 2022-10-17 RX ORDER — LISINOPRIL 20 MG/1
40 TABLET ORAL DAILY
Status: DISCONTINUED | OUTPATIENT
Start: 2022-10-18 | End: 2022-10-17

## 2022-10-17 RX ORDER — METOPROLOL TARTRATE 25 MG/1
25 TABLET, FILM COATED ORAL ONCE
Status: COMPLETED | OUTPATIENT
Start: 2022-10-17 | End: 2022-10-17

## 2022-10-17 RX ORDER — SODIUM CHLORIDE 0.9 % (FLUSH) 0.9 %
5 SYRINGE (ML) INJECTION
Status: DISCONTINUED | OUTPATIENT
Start: 2022-10-17 | End: 2022-12-03

## 2022-10-17 RX ORDER — LISINOPRIL 20 MG/1
40 TABLET ORAL
Status: COMPLETED | OUTPATIENT
Start: 2022-10-17 | End: 2022-10-17

## 2022-10-17 RX ORDER — MUPIROCIN 20 MG/G
OINTMENT TOPICAL 2 TIMES DAILY
Status: COMPLETED | OUTPATIENT
Start: 2022-10-18 | End: 2022-10-22

## 2022-10-17 RX ORDER — ACETAMINOPHEN 325 MG/1
650 TABLET ORAL EVERY 4 HOURS PRN
Status: DISCONTINUED | OUTPATIENT
Start: 2022-10-17 | End: 2022-10-26

## 2022-10-17 RX ORDER — CEFEPIME HYDROCHLORIDE 1 G/50ML
1 INJECTION, SOLUTION INTRAVENOUS
Status: DISCONTINUED | OUTPATIENT
Start: 2022-10-18 | End: 2022-10-22

## 2022-10-17 RX ORDER — METHOCARBAMOL 500 MG/1
500 TABLET, FILM COATED ORAL 3 TIMES DAILY PRN
Status: DISCONTINUED | OUTPATIENT
Start: 2022-10-17 | End: 2022-12-13 | Stop reason: HOSPADM

## 2022-10-17 RX ORDER — IPRATROPIUM BROMIDE AND ALBUTEROL SULFATE 2.5; .5 MG/3ML; MG/3ML
3 SOLUTION RESPIRATORY (INHALATION) EVERY 4 HOURS PRN
Status: DISCONTINUED | OUTPATIENT
Start: 2022-10-17 | End: 2022-12-13 | Stop reason: HOSPADM

## 2022-10-17 RX ORDER — ACETAMINOPHEN 500 MG
1000 TABLET ORAL EVERY 8 HOURS PRN
Status: DISCONTINUED | OUTPATIENT
Start: 2022-10-17 | End: 2022-10-26

## 2022-10-17 RX ORDER — CEFEPIME HYDROCHLORIDE 1 G/50ML
2 INJECTION, SOLUTION INTRAVENOUS
Status: COMPLETED | OUTPATIENT
Start: 2022-10-17 | End: 2022-10-17

## 2022-10-17 RX ORDER — IBUPROFEN 200 MG
16 TABLET ORAL
Status: DISCONTINUED | OUTPATIENT
Start: 2022-10-17 | End: 2022-12-09

## 2022-10-17 RX ADMIN — PROCHLORPERAZINE EDISYLATE 5 MG: 5 INJECTION INTRAMUSCULAR; INTRAVENOUS at 06:10

## 2022-10-17 RX ADMIN — CEFEPIME HYDROCHLORIDE 2 G: 2 INJECTION, SOLUTION INTRAVENOUS at 01:10

## 2022-10-17 RX ADMIN — METOPROLOL TARTRATE 25 MG: 25 TABLET, FILM COATED ORAL at 11:10

## 2022-10-17 RX ADMIN — SODIUM CHLORIDE 500 ML: 0.9 INJECTION, SOLUTION INTRAVENOUS at 04:10

## 2022-10-17 RX ADMIN — GUAIFENESIN AND DEXTROMETHORPHAN HYDROBROMIDE 1 TABLET: 600; 30 TABLET, EXTENDED RELEASE ORAL at 04:10

## 2022-10-17 RX ADMIN — SODIUM CHLORIDE 500 ML: 0.9 INJECTION, SOLUTION INTRAVENOUS at 12:10

## 2022-10-17 RX ADMIN — HYDRALAZINE HYDROCHLORIDE 100 MG: 50 TABLET ORAL at 04:10

## 2022-10-17 RX ADMIN — HYDRALAZINE HYDROCHLORIDE 100 MG: 50 TABLET ORAL at 09:10

## 2022-10-17 RX ADMIN — LISINOPRIL 40 MG: 20 TABLET ORAL at 11:10

## 2022-10-17 RX ADMIN — POTASSIUM CHLORIDE 40 MEQ: 1500 TABLET, EXTENDED RELEASE ORAL at 04:10

## 2022-10-17 RX ADMIN — VANCOMYCIN HYDROCHLORIDE 1250 MG: 1.25 INJECTION, POWDER, LYOPHILIZED, FOR SOLUTION INTRAVENOUS at 01:10

## 2022-10-17 RX ADMIN — AZITHROMYCIN 500 MG: 500 INJECTION, POWDER, LYOPHILIZED, FOR SOLUTION INTRAVENOUS at 12:10

## 2022-10-17 RX ADMIN — AMLODIPINE BESYLATE 10 MG: 10 TABLET ORAL at 11:10

## 2022-10-17 RX ADMIN — IOHEXOL 75 ML: 350 INJECTION, SOLUTION INTRAVENOUS at 01:10
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
## 2022-10-17 NOTE — ASSESSMENT & PLAN NOTE
- BP currently not well-controlled  - continue home regimen of amlodipine 10 mg daily, hydralazine TID, metoprolol succinate 50 mg  - holding home lisinopril 40 mg daily in setting of WILFREDO  - will continue to monitor and further titrate antihypertensives as clinically indicated

## 2022-10-17 NOTE — ASSESSMENT & PLAN NOTE
- patient's anemia is currently uncontrolled. S/p 0 units of PRBCs.   - etiology likely due to hemoptysis  - monitor closely  - current CBC reviewed -   Lab Results   Component Value Date    HGB 7.7 (L) 10/17/2022    HCT 24.6 (L) 10/17/2022     - monitor serial CBC and transfuse if patient becomes hemodynamically unstable, symptomatic, or H/H drops below 7/21.

## 2022-10-17 NOTE — HPI
Kristin Goodman is a 77 y.o. female with a past medical history of HTN, hypothyroidism, HFpEF, and osteoarthritis of the arm who has presented to the ED for cough, SOB, and weakness. Daughter is present at the bedside. Patient presented to the ED on 9/24 with hemoptysis, CT and CXR showed high suspicion for multilobar pneumonia. Patient was discharged with a 10-day course of levofloxacin and an albuterol inhaler. Patient followed up with her PCP on 10/4 with slight improvement in symptoms and went back to work. Patient continued to have worsening symptoms and presented to the ED again on 10/14 for evaluation and no interventions were done. Patient endorses fevers over the last few days up to 102.4 F with progressive SOB. She endorses hemoptysis with moderate amount of blood, generalized weakness, productive cough, SOB, and loss of appetite. Denies chest pain, nausea, vomiting, abdominal pain, or urinary changes.    ED: hypertensive up to 219/93 and tachycardic up to 117. Oxygen saturation on 92% on RA, placed on 5L NC with sats >97%. CBC remarkable for leukocytosis of 16.03 and Hb 7.7. K 3.3. Cr 1.6, baseline ~1.0. . Troponin 0.061. COVID and flu negative. EKG NSR. CT chest with contrast pending at time of admission. Given home amlodipine and lisinopril. Given 500mL NS, IV azithromycin, cefepime and vanc.
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
## 2022-10-17 NOTE — PROGRESS NOTES
Pharmacokinetic Initial Assessment: IV Vancomycin    Assessment/Plan:    Initiate intravenous vancomycin with loading dose of 1250 mg once with subsequent doses when random concentrations are less than 20 mcg/mL  Desired empiric serum trough concentration is 10 to 20 mcg/mL  Draw vancomycin random level on 10/18 at 1200.  Pharmacy will continue to follow and monitor vancomycin.      Please contact pharmacy at extension [phone] with any questions regarding this assessment.     Thank you for the consult,   Merlene Gray       Patient brief summary:  Kristin Goodman is a 77 y.o. female initiated on antimicrobial therapy with IV Vancomycin for treatment of suspected  Pneumonia    Drug Allergies:   Review of patient's allergies indicates:   Allergen Reactions    Ampicillin     Penicillins      Other reaction(s): Hives    Sulfa (sulfonamide antibiotics) Rash and Hives       Actual Body Weight:   63.5 kg    Renal Function:   Estimated Creatinine Clearance: 31.9 mL/min (based on SCr of 1.3 mg/dL).,     Dialysis Method (if applicable):  N/A    CBC (last 72 hours):  Recent Labs   Lab Result Units 10/14/22  1638   WBC K/uL 11.09   Hemoglobin g/dL 9.5*   Hematocrit % 29.5*   Platelets K/uL 367   Gran % % 62.0   Lymph % % 24.8   Mono % % 9.8   Eosinophil % % 2.6   Basophil % % 0.4   Differential Method  Automated       Metabolic Panel (last 72 hours):  Recent Labs   Lab Result Units 10/14/22  1638   Sodium mmol/L 139   Potassium mmol/L 3.7   Chloride mmol/L 108   CO2 mmol/L 20*   Glucose mg/dL 83   BUN mg/dL 19   Creatinine mg/dL 1.3   Albumin g/dL 3.0*   Total Bilirubin mg/dL 0.6   Alkaline Phosphatase U/L 50*   AST U/L 39   ALT U/L 25       Drug levels (last 3 results):  No results for input(s): VANCOMYCINRA, VANCORANDOM, VANCOMYCINPE, VANCOPEAK, VANCOMYCINTR, VANCOTROUGH in the last 72 hours.    Microbiologic Results:  Microbiology Results (last 7 days)       Procedure Component Value Units Date/Time    Blood culture #1  **CANNOT BE ORDERED STAT** [[id number]]     Order Status: Sent Specimen: Blood     Blood culture #2 **CANNOT BE ORDERED STAT** [[id number]]     Order Status: Sent Specimen: Blood     Influenza A & B by Molecular [[id number]] Collected: 10/17/22 1023    Order Status: Sent Specimen: Nasopharyngeal Swab Updated: 10/17/22 1037
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
## 2022-10-17 NOTE — ASSESSMENT & PLAN NOTE
Patient with acute kidney injury likely due to IVVD/dehydration WILFREDO is currently stable. Labs reviewed- Renal function/electrolytes with Estimated Creatinine Clearance: 25.9 mL/min (A) (based on SCr of 1.6 mg/dL (H)). according to latest data. Monitor urine output and serial BMP and adjust therapy as needed. Avoid nephrotoxins and renally dose meds for GFR listed above.   - baseline Cr of ~1.0

## 2022-10-17 NOTE — ASSESSMENT & PLAN NOTE
- EF 65% per most recent echo in 2020   - clinically euvolemic on admission  -   - troponin 0.061  - EKG NSR  - continue metoprolol succinate, not currently on diuretics  - repeat echo ordered  - strict I/Os  - 1.5L fluid restriction   - daily weights  - will continue to monitor on tele
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
## 2022-10-17 NOTE — ASSESSMENT & PLAN NOTE
Multifocal pneumonia    This patient does have evidence of infective focus  My overall impression is sepsis. Vital signs were reviewed and noted in progress note.  2/4 SIRS: tachycardia and leukocytosis  On 5L NC, wean as tolerated  Antibiotics given-   Antibiotics (From admission, onward)    Start     Stop Route Frequency Ordered    10/17/22 1300  vancomycin 1.25 g in dextrose 5% 250 mL IVPB (ready to mix)         -- IV Once 10/17/22 1125    10/17/22 1230  cefepime in dextrose 5 % IVPB 2 g         10/18 0029 IV ED 1 Time 10/17/22 1102    10/17/22 1201  vancomycin - pharmacy to dose  (vancomycin IVPB)        See Hyperspace for full Linked Orders Report.    -- IV pharmacy to manage frequency 10/17/22 1102        Cultures were taken-   Microbiology Results (last 7 days)     Procedure Component Value Units Date/Time    Blood culture #2 **CANNOT BE ORDERED STAT** [[id number]] Collected: 10/17/22 1201    Order Status: Sent Specimen: Blood from Peripheral, Antecubital, Left Updated: 10/17/22 1213    Blood culture #1 **CANNOT BE ORDERED STAT** [[id number]] Collected: 10/17/22 1201    Order Status: Sent Specimen: Blood from Peripheral, Forearm, Left Updated: 10/17/22 1213    Influenza A & B by Molecular [[id number]] Collected: 10/17/22 1023    Order Status: Sent Specimen: Nasopharyngeal Swab Updated: 10/17/22 1039        Latest lactate reviewed, they are-  Recent Labs   Lab 10/17/22  1200   LACTATE 0.9       Organ dysfunction indicated by Acute kidney injury and elevated troponin  Source- mulitfocal pneunoma    Source control Achieved by- vanc and cefepime

## 2022-10-18 PROBLEM — N17.9 ACUTE RENAL FAILURE SUPERIMPOSED ON STAGE 3 CHRONIC KIDNEY DISEASE: Status: ACTIVE | Noted: 2022-10-18

## 2022-10-18 PROBLEM — N18.30 CKD (CHRONIC KIDNEY DISEASE), STAGE III: Status: ACTIVE | Noted: 2019-04-27

## 2022-10-18 PROBLEM — N18.30 ACUTE RENAL FAILURE SUPERIMPOSED ON STAGE 3 CHRONIC KIDNEY DISEASE: Status: ACTIVE | Noted: 2022-10-18

## 2022-10-18 LAB
ALBUMIN SERPL BCP-MCNC: 2.7 G/DL (ref 3.5–5.2)
ALP SERPL-CCNC: 61 U/L (ref 55–135)
ALT SERPL W/O P-5'-P-CCNC: 51 U/L (ref 10–44)
ANION GAP SERPL CALC-SCNC: 11 MMOL/L (ref 8–16)
AST SERPL-CCNC: 100 U/L (ref 10–40)
BACTERIA UR CULT: NO GROWTH
BASOPHILS # BLD AUTO: 0.04 K/UL (ref 0–0.2)
BASOPHILS NFR BLD: 0.2 % (ref 0–1.9)
BILIRUB SERPL-MCNC: 1 MG/DL (ref 0.1–1)
BUN SERPL-MCNC: 21 MG/DL (ref 8–23)
CALCIUM SERPL-MCNC: 8.5 MG/DL (ref 8.7–10.5)
CHLORIDE SERPL-SCNC: 104 MMOL/L (ref 95–110)
CO2 SERPL-SCNC: 20 MMOL/L (ref 23–29)
CREAT SERPL-MCNC: 1.6 MG/DL (ref 0.5–1.4)
DIFFERENTIAL METHOD: ABNORMAL
EOSINOPHIL # BLD AUTO: 0 K/UL (ref 0–0.5)
EOSINOPHIL NFR BLD: 0.1 % (ref 0–8)
ERYTHROCYTE [DISTWIDTH] IN BLOOD BY AUTOMATED COUNT: 13.3 % (ref 11.5–14.5)
EST. GFR  (NO RACE VARIABLE): 33.4 ML/MIN/1.73 M^2
GLUCOSE SERPL-MCNC: 132 MG/DL (ref 70–110)
HCT VFR BLD AUTO: 22.4 % (ref 37–48.5)
HGB BLD-MCNC: 7.3 G/DL (ref 12–16)
HYPOCHROMIA BLD QL SMEAR: ABNORMAL
IMM GRANULOCYTES # BLD AUTO: 0.18 K/UL (ref 0–0.04)
IMM GRANULOCYTES NFR BLD AUTO: 0.9 % (ref 0–0.5)
INFLUENZA A, MOLECULAR: NEGATIVE
INFLUENZA B, MOLECULAR: NEGATIVE
LYMPHOCYTES # BLD AUTO: 2 K/UL (ref 1–4.8)
LYMPHOCYTES NFR BLD: 10.2 % (ref 18–48)
MCH RBC QN AUTO: 27.8 PG (ref 27–31)
MCHC RBC AUTO-ENTMCNC: 32.6 G/DL (ref 32–36)
MCV RBC AUTO: 85 FL (ref 82–98)
MONOCYTES # BLD AUTO: 2.3 K/UL (ref 0.3–1)
MONOCYTES NFR BLD: 11.7 % (ref 4–15)
NEUTROPHILS # BLD AUTO: 15.1 K/UL (ref 1.8–7.7)
NEUTROPHILS NFR BLD: 76.9 % (ref 38–73)
NRBC BLD-RTO: 0 /100 WBC
PLATELET # BLD AUTO: 406 K/UL (ref 150–450)
PLATELET BLD QL SMEAR: ABNORMAL
PMV BLD AUTO: 10.8 FL (ref 9.2–12.9)
POTASSIUM SERPL-SCNC: 3.6 MMOL/L (ref 3.5–5.1)
PROT SERPL-MCNC: 7.4 G/DL (ref 6–8.4)
RBC # BLD AUTO: 2.63 M/UL (ref 4–5.4)
SODIUM SERPL-SCNC: 135 MMOL/L (ref 136–145)
SPECIMEN SOURCE: NORMAL
VANCOMYCIN SERPL-MCNC: 12.2 UG/ML
WBC # BLD AUTO: 19.62 K/UL (ref 3.9–12.7)

## 2022-10-18 PROCEDURE — 25000003 PHARM REV CODE 250: Performed by: INTERNAL MEDICINE

## 2022-10-18 PROCEDURE — 80202 ASSAY OF VANCOMYCIN: CPT | Performed by: EMERGENCY MEDICINE

## 2022-10-18 PROCEDURE — 85025 COMPLETE CBC W/AUTO DIFF WBC: CPT

## 2022-10-18 PROCEDURE — 76937 US GUIDE VASCULAR ACCESS: CPT

## 2022-10-18 PROCEDURE — 63600175 PHARM REV CODE 636 W HCPCS: Performed by: HOSPITALIST

## 2022-10-18 PROCEDURE — C1751 CATH, INF, PER/CENT/MIDLINE: HCPCS

## 2022-10-18 PROCEDURE — 25000003 PHARM REV CODE 250

## 2022-10-18 PROCEDURE — 25000003 PHARM REV CODE 250: Performed by: HOSPITALIST

## 2022-10-18 PROCEDURE — 36415 COLL VENOUS BLD VENIPUNCTURE: CPT | Performed by: EMERGENCY MEDICINE

## 2022-10-18 PROCEDURE — 36415 COLL VENOUS BLD VENIPUNCTURE: CPT

## 2022-10-18 PROCEDURE — 99232 SBSQ HOSP IP/OBS MODERATE 35: CPT | Mod: ,,, | Performed by: HOSPITALIST

## 2022-10-18 PROCEDURE — 99232 PR SUBSEQUENT HOSPITAL CARE,LEVL II: ICD-10-PCS | Mod: ,,, | Performed by: HOSPITALIST

## 2022-10-18 PROCEDURE — 80053 COMPREHEN METABOLIC PANEL: CPT

## 2022-10-18 PROCEDURE — 20600001 HC STEP DOWN PRIVATE ROOM

## 2022-10-18 PROCEDURE — 63600175 PHARM REV CODE 636 W HCPCS

## 2022-10-18 PROCEDURE — 36410 VNPNXR 3YR/> PHY/QHP DX/THER: CPT

## 2022-10-18 RX ORDER — SODIUM CHLORIDE 9 MG/ML
INJECTION, SOLUTION INTRAVENOUS CONTINUOUS
Status: DISCONTINUED | OUTPATIENT
Start: 2022-10-18 | End: 2022-10-19

## 2022-10-18 RX ORDER — METOPROLOL SUCCINATE 50 MG/1
50 TABLET, EXTENDED RELEASE ORAL DAILY
Status: DISCONTINUED | OUTPATIENT
Start: 2022-10-18 | End: 2022-10-22

## 2022-10-18 RX ORDER — CLONIDINE HYDROCHLORIDE 0.1 MG/1
0.2 TABLET ORAL EVERY 4 HOURS PRN
Status: DISCONTINUED | OUTPATIENT
Start: 2022-10-18 | End: 2022-11-02

## 2022-10-18 RX ORDER — AMLODIPINE BESYLATE 10 MG/1
10 TABLET ORAL DAILY
Status: DISCONTINUED | OUTPATIENT
Start: 2022-10-18 | End: 2022-10-26

## 2022-10-18 RX ORDER — HYDRALAZINE HYDROCHLORIDE 50 MG/1
100 TABLET, FILM COATED ORAL 3 TIMES DAILY
Status: DISCONTINUED | OUTPATIENT
Start: 2022-10-18 | End: 2022-10-26

## 2022-10-18 RX ADMIN — MUPIROCIN: 20 OINTMENT TOPICAL at 09:10

## 2022-10-18 RX ADMIN — MUPIROCIN: 20 OINTMENT TOPICAL at 08:10

## 2022-10-18 RX ADMIN — AMLODIPINE BESYLATE 10 MG: 10 TABLET ORAL at 07:10

## 2022-10-18 RX ADMIN — VANCOMYCIN HYDROCHLORIDE 500 MG: 500 INJECTION, POWDER, LYOPHILIZED, FOR SOLUTION INTRAVENOUS at 04:10

## 2022-10-18 RX ADMIN — GUAIFENESIN AND DEXTROMETHORPHAN HYDROBROMIDE 1 TABLET: 600; 30 TABLET, EXTENDED RELEASE ORAL at 07:10

## 2022-10-18 RX ADMIN — METOPROLOL SUCCINATE 50 MG: 50 TABLET, EXTENDED RELEASE ORAL at 07:10

## 2022-10-18 RX ADMIN — Medication 1 CAPSULE: at 09:10

## 2022-10-18 RX ADMIN — CEFEPIME HYDROCHLORIDE 1 G: 1 INJECTION, SOLUTION INTRAVENOUS at 03:10

## 2022-10-18 RX ADMIN — HYDRALAZINE HYDROCHLORIDE 100 MG: 50 TABLET ORAL at 08:10

## 2022-10-18 RX ADMIN — SODIUM CHLORIDE: 0.9 INJECTION, SOLUTION INTRAVENOUS at 12:10

## 2022-10-18 RX ADMIN — Medication 6 MG: at 02:10

## 2022-10-18 RX ADMIN — HYDRALAZINE HYDROCHLORIDE 100 MG: 50 TABLET ORAL at 03:10

## 2022-10-18 RX ADMIN — HYDRALAZINE HYDROCHLORIDE 100 MG: 50 TABLET ORAL at 07:10

## 2022-10-18 RX ADMIN — LEVOTHYROXINE SODIUM 25 MCG: 25 TABLET ORAL at 07:10

## 2022-10-18 RX ADMIN — ONDANSETRON 8 MG: 8 TABLET, ORALLY DISINTEGRATING ORAL at 09:10

## 2022-10-18 RX ADMIN — GUAIFENESIN AND DEXTROMETHORPHAN HYDROBROMIDE 1 TABLET: 600; 30 TABLET, EXTENDED RELEASE ORAL at 08:10

## 2022-10-18 RX ADMIN — CEFEPIME HYDROCHLORIDE 1 G: 1 INJECTION, SOLUTION INTRAVENOUS at 01:10

## 2022-10-18 NOTE — PLAN OF CARE
[facility] - [facility] ([facility]-)  Initial Discharge Assessment       Primary Care Provider: Lori Hernandez MD  Admission Diagnosis: Shortness of breath [R06.02]  Hypoxia [R09.02]  Elevated troponin [R77.8]  Chest pain [R07.9]  Pneumonia of both upper lobes due to infectious organism [J18.9]  Admission Date: 10/17/2022  Expected Discharge Date: 10/21/2022    CM met with patient's daughter  at bedside to complete Discharge Planning Assessment.  Patient is AAO x 4.  CM verified that all demographic information on face sheet is correct.  CM verified PCP -  Lori Hernandez, .  CM verified primary health insurance is peoples managed medicare and secondary is medicaid .  Patient current with home health - no.  Has below listed DME.  POA and Living Will on file - no.  Patient on dialysis - no .  Patient on coumadin - no.  Patient is a 30-day readmission - no.  Patient currently has financial issues - no.  Patient will have transportation upon discharge from facility - yes per family .  Patient ADLs - independent .  Patient lives -  with adult daughter .   Discharge Planning Booklet given to patient/family and discussed.    All questions addressed.    Patient admitted from home where she lives with her daughter. Normally independent of ADL's. Has can and walker at home  but does not currently use either. Work up for infection and possible PNA in progress. Needs are TBD and CM to follow for DC planning needs        Payor: [employer] MEDICARE / Plan: [employer] HEALTH / Product Type: Medicare Advantage /   Extended Emergency Contact Information  Primary Emergency Contact: Roro Goodman  Address: [address] [place] [address] [address]           [place] [address] [facility]  Home Phone: [phone]  Mobile Phone: [phone]  Relation: Daughter  Secondary Emergency Contact: Padma Paez   [place]  Mobile Phone: [phone]  Relation: Sister  Discharge Plan A: Home  with family       [facility] [address] [place], [place] - [address]  [address] [place] [address]  Phone: [phone] Fax: [phone]    Initial Assessment (most recent)       Adult Discharge Assessment - 10/18/22 1402          Discharge Assessment    Assessment Type Discharge Planning Assessment     Confirmed/corrected address, phone number and insurance Yes     Confirmed Demographics Correct on Facesheet     Source of Information family     Communicated ANTHONY with patient/caregiver Date not available/Unable to determine     Lives With child(arthur), adult     Do you expect to return to your current living situation? Yes     Do you have help at home or someone to help you manage your care at home? Yes     Who are your caregiver(s) and their phone number(s)? daughter     Prior to hospitilization cognitive status: Alert/Oriented     Current cognitive status: Alert/Oriented     Walking or Climbing Stairs Difficulty none     Dressing/Bathing Difficulty none     Home Accessibility wheelchair accessible     Equipment Currently Used at Home cane, straight;walker, rolling     Readmission within 30 days? No     Patient currently being followed by outpatient case management? No     Do you currently have service(s) that help you manage your care at home? No     Do you take prescription medications? Yes     Do you have prescription coverage? Yes     Do you have any problems affording any of your prescribed medications? No     Is the patient taking medications as prescribed? yes     How do you get to doctors appointments? family or friend will provide     Are you on dialysis? No     Do you take coumadin? No     Discharge Plan A Home with family                      Cleveland Shepherd RN  Case Management  Ext: [phone]

## 2022-10-18 NOTE — CARE UPDATE
RAPID RESPONSE NURSE PROACTIVE ROUNDING NOTE       Time of Visit: 930    Admit Date: 10/17/2022  LOS: 1  Code Status: Full Code   Date of Visit: 10/18/2022  : 1945  Age: 77 y.o.  Sex: female  Race: Black or   Bed: 20014/25454 A:   MRN: [id number]  Was the patient discharged from an ICU this admission? No   Was the patient discharged from a PACU within last 24 hours? No   Did the patient receive conscious sedation/general anesthesia in last 24 hours? No  Was the patient in the ED within the past 24 hours? Yes  Was the patient on NIPPV within the past 24 hours? No   Attending Physician: Madie Lancaster MD  Primary Service: [facility]   Time spent at the bedside: < 15 min    SITUATION    Notified by [employer] patient alert.  Reason for alert: Tachypnea  Called to evaluate the patient for Respiratory    BACKGROUND     Why is the patient in the hospital?: Sepsis due to pneumonia    Patient has a past medical history of Acute blood loss anemia, Allergy, Back pain, Chronic diastolic heart failure, Chronic diastolic heart failure, Colon polyp, Disorder of kidney and ureter, H/O Bell's palsy, Helicobacter pylori (H. pylori), HTN (hypertension), Hypothyroid, OA (osteoarthritis), DONAVAN (obstructive sleep apnea), Pneumonia due to other staphylococcus, Pulmonary HTN, Sepsis due to pneumonia, Trouble in sleeping, and Urinary incontinence.    Last Vitals:  Temp: 98.1 °F (36.7 °C) (10/18 1200)  Pulse: 87 (10/18 1140)  Resp: 20 (10/18 0930)  BP: 159/68 (10/18 0930)  SpO2: 95 % (10/18 1140)    24 Hours Vitals Range:  Temp:  [98.1 °F (36.7 °C)-100 °F (37.8 °C)]   Pulse:  []   Resp:  [18-39]   BP: (159-191)/(68-84)   SpO2:  [95 %-99 %]     Labs:  Recent Labs     10/17/22  1200 10/18/22  0337   WBC 16.03* 19.62*   HGB 7.7* 7.3*   HCT 24.6* 22.4*    406       Recent Labs     10/17/22  1200 10/18/22  0337   * 135*   K 3.3* 3.6    104   CO2 24 20*   CREATININE 1.6* 1.6*   GLU 92 132*   MG  1.9  --         No results for input(s): PH, PCO2, PO2, HCO3, POCSATURATED, BE in the last 72 hours.     ASSESSMENT    Pt seen on am rounds for MEWS. Upon assessment, pt AOX4, no acute distress, non subjective complaints at this time. Pt repositioned in bed for optimal respiratory status.     Physical Exam  HENT:      Mouth/Throat:      Mouth: Mucous membranes are moist.      Pharynx: Oropharynx is clear.   Cardiovascular:      Rate and Rhythm: Normal rate and regular rhythm.   Pulmonary:      Effort: Pulmonary effort is normal.   Skin:     General: Skin is warm and dry.      Capillary Refill: Capillary refill takes less than 2 seconds.   Neurological:      General: No focal deficit present.      Mental Status: She is alert and oriented to person, place, and time.       INTERVENTIONS    The patient was seen for Respiratory problem. Staff concerns included tachypnea. The following interventions were performed: supplemental oxygen.    RECOMMENDATIONS    Monitor respiratory status per unit protocol. Notify Primary and RRN teams of increase in O2 demand. Pulmonary toileting. Maintain tele and IV access.     PROVIDER ESCALATION    Yes/No  no    Orders received and case discussed with NA.    Disposition: Remain in room 68730.    FOLLOW-UP    bedside Daylin COLUNGA  updated on plan of care. Instructed to call the Rapid Response Nurse, Timothy Murphy RN at [phone] for additional questions or concerns.

## 2022-10-18 NOTE — AI DETERIORATION ALERT
"RAPID RESPONSE NURSE AI ALERT       AI alert received.    Chart Reviewed: 10/18/2022, 5:11 AM    MRN: [id number]  Bed: 04720/75748 A    Dx: Sepsis due to pneumonia    Kristin Goodman has a past medical history of Acute blood loss anemia, Allergy, Back pain, Chronic diastolic heart failure, Chronic diastolic heart failure, Colon polyp, Disorder of kidney and ureter, H/O Bell's palsy, Helicobacter pylori (H. pylori), HTN (hypertension), Hypothyroid, OA (osteoarthritis), DONAVAN (obstructive sleep apnea), Pneumonia due to other staphylococcus, Pulmonary HTN, Sepsis due to pneumonia, Trouble in sleeping, and Urinary incontinence.    Last VS: BP (!) 182/80 (BP Location: Right arm, Patient Position: Lying)   Pulse 104   Temp 98.4 °F (36.9 °C) (Oral)   Resp 20   Ht 5' 1" (1.549 m)   Wt 63.5 kg (139 lb 15.9 oz)   SpO2 95%   BMI 26.45 kg/m²     24H Vital Sign Range:  Temp:  [98.4 °F (36.9 °C)-99.3 °F (37.4 °C)]   Pulse:  [102-121]   Resp:  [20-39]   BP: (160-210)/(72-93)   SpO2:  [92 %-98 %]     Level of Consciousness (AVPU): alert    Recent Labs     10/17/22  1200 10/18/22  0337   WBC 16.03* 19.62*   HGB 7.7* 7.3*   HCT 24.6* 22.4*    406       Recent Labs     10/17/22  1200 10/18/22  0337   * 135*   K 3.3* 3.6    104   CO2 24 20*   CREATININE 1.6* 1.6*   GLU 92 132*   MG 1.9  --         No results for input(s): PH, PCO2, PO2, HCO3, POCSATURATED, BE in the last 72 hours.     OXYGEN:  Flow (L/min): 2     O2 Device (Oxygen Therapy): nasal cannula    MEWS score: 3    Bedside Trudy COLUNGA  contacted. No concerns verbalized at this time. Instructed to call [phone] for further concerns or assistance.    Leisa Salter RN        "

## 2022-10-18 NOTE — PLAN OF CARE
Problem: Adult Inpatient Plan of Care  Goal: Plan of Care Review  Outcome: Ongoing, Progressing  Goal: Patient-Specific Goal (Individualized)  Outcome: Ongoing, Progressing  Goal: Absence of Hospital-Acquired Illness or Injury  Outcome: Ongoing, Progressing  Goal: Optimal Comfort and Wellbeing  Outcome: Ongoing, Progressing  Goal: Readiness for Transition of Care  Outcome: Ongoing, Progressing     Problem: Infection  Goal: Absence of Infection Signs and Symptoms  Outcome: Ongoing, Progressing     Problem: Adjustment to Illness (Sepsis/Septic Shock)  Goal: Optimal Coping  Outcome: Ongoing, Progressing     Problem: Bleeding (Sepsis/Septic Shock)  Goal: Absence of Bleeding  Outcome: Ongoing, Progressing     Problem: Glycemic Control Impaired (Sepsis/Septic Shock)  Goal: Blood Glucose Level Within Desired Range  Outcome: Ongoing, Progressing     Problem: Infection Progression (Sepsis/Septic Shock)  Goal: Absence of Infection Signs and Symptoms  Outcome: Ongoing, Progressing     Problem: Nutrition Impaired (Sepsis/Septic Shock)  Goal: Optimal Nutrition Intake  Outcome: Ongoing, Progressing     Problem: Fluid and Electrolyte Imbalance (Acute Kidney Injury/Impairment)  Goal: Fluid and Electrolyte Balance  Outcome: Ongoing, Progressing     Problem: Oral Intake Inadequate (Acute Kidney Injury/Impairment)  Goal: Optimal Nutrition Intake  Outcome: Ongoing, Progressing     Problem: Renal Function Impairment (Acute Kidney Injury/Impairment)  Goal: Effective Renal Function  Outcome: Ongoing, Progressing     Problem: Fluid Imbalance (Pneumonia)  Goal: Fluid Balance  Outcome: Ongoing, Progressing     Problem: Infection (Pneumonia)  Goal: Resolution of Infection Signs and Symptoms  Outcome: Ongoing, Progressing     Problem: Respiratory Compromise (Pneumonia)  Goal: Effective Oxygenation and Ventilation  Outcome: Ongoing, Progressing
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
## 2022-10-18 NOTE — ASSESSMENT & PLAN NOTE
CT chest show extensive pneumonia,patient has been  started on broad spectrum IV Abx with cefepime and vancomycin,cultures are pending

## 2022-10-18 NOTE — HOSPITAL COURSE
HM Course:  Ms. Goodman was admitted to [facility] for management of multifocal pneumonia and acute hypoxemic respiratory failure after presenting to the ED with fevers, cough, hemoptysis, and dyspnea. She required escalation to the MICU due to abrupt decline in her respiratory status, associated with hemoptysis and requiring NIPPV. CT chest showed bilateral patchy ground glass opacities. Labs additionally were notable for acute renal failure on CKD3. Pulmonology was consulted while under the care of [facility] and felt this picture was consistent with diffuse alveolar hemorrhage.     ICU Course:   Her workup revealed a strongly positive MPO Ab consistent with clinical picture of microscopic polyangiitis with pulmonary and renal involvement. She underwent Trialysis catheter placement 10/25/2022 in the Medical ICU. 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     Rheumatology was consulted, extensive workup revealed MITUL + 1:2560 homogenous, +dsDNA, normal complements, PR3 1.2 (slight +),  (+), cANCA + 1:80, pANCA neg, GBMAb neg, trace cryos. Due to concern for MPA, she was started on pulse dose IV methylprednisolone 1000mg for 3 days, and plasmapheresis under the guidance of Transfusion Medicine. Patient was placed on steroid taper and completed PLEX. She additionally received rituximab and cyclophosphamide. OI prophylaxis was provided with atovaquone due to a sulfa allergy.     Nephrology was consulted for evaluation of acute renal failure in the setting of MPA. She did require SLED in the ICU for renal clearance and remains on intermittent hemodialysis. She is expected to continue HD once discharged.     Her acute hypoxemic respiratory failure improved and she  was weaned off of supplemental oxygen. She stepped down to [facility] 10/28.     HM Course:  She underwent MBBS for evaluation of dysphagia, which showed global delayed initiation of swallow and aspiration with thin liquids. Due to concern for possible prior stroke, head imaging was completed and negative for acute finding. She was NPO with NG tube. ENT was consulted for evaluation of dysphagia and cervical adenopathy.  Patient did not tolerate laryngoscopy; unable to visualize vocal cords but given her lack of hoarseness, they did not suspect vocal fold paresis/paralysis. MRI recommended by rheum to fully rule out any potential neurological cause of the patient's dysphagia; MRI demonstrated remote left thalamic lacunar type infarct and punctate remote left cerebellar infarcts.  She continued to work with speech therapy.  EGD completed 11/14 without abnormalities.  Per GI, Suspect some aspect of esophageal dysmotility in setting of critical illness. No further testing at this time.  Per SLP, diet advanced on 11/15 and NG tube removed.    Her other chronic medical conditions including hypothyroidism, diastolic CHF, and hypertension were managed with her home medications, with dose adjustments as needed.     Patient was noted to have downtrending Hg 11/17- required 1u pRBC. Rheum continued to follow for further steroid dosing. Patient was started on 2g NaCl tab TID for SIADH. SLP recommended mechanical soft diet with thin liquids 11/17. Na normalized 11/18. Hg improved to 8.5 following 1u pRBC. Patient had some hyperkalemia- started on scheduled lokelma. Patient continued to have improved UOP; however, BUN: Cr ratio uptrended. Nephro decided to hang off on HD as patient was having 500-600mL/24hr. He was started on scheduled NaHCO3 as ewll. Patient underwent HD 11/23. Patient had drop in Hg once again- 6.5 on 11/25. Given 1u pRBC (2nd unit). Underwent HD 11/30. Nephrology following to re-evaluate daily for need  for HD and permacath placement.

## 2022-10-18 NOTE — PROGRESS NOTES
Pharmacokinetic Assessment Follow Up: IV Vancomycin    Vancomycin serum concentration assessment(s):    The trough level was drawn correctly and can be used to guide therapy at this time. The measurement is below the desired definitive target range of 15 to 20 mcg/mL.    Vancomycin Regimen Plan:    Since patients WBC increased today, will be more aggressive and target 15-20  Patients scr remains elevated  Will plan to continue to pulse dose patient  Plan for a single 500 mg IV vancomycin dose today followed by a vancomycin random to be obtained with 10/19 AM labs    Drug levels (last 3 results):  Recent Labs   Lab Result Units 10/18/22  1246   Vancomycin, Random ug/mL 12.2     Pharmacy will continue to follow and monitor vancomycin.    Please contact pharmacy at extension [phone] for questions regarding this assessment.    Thank you for the consult,   Lexx Tapia, PharmD, [facility]      Patient brief summary:  Kristin Goodman is a 77 y.o. female initiated on antimicrobial therapy with IV Vancomycin for treatment of lower respiratory infection    Drug Allergies:   Review of patient's allergies indicates:   Allergen Reactions    Ampicillin     Penicillins      Other reaction(s): Hives    Sulfa (sulfonamide antibiotics) Rash and Hives     Actual Body Weight:   63.5 kg    Renal Function:   Estimated Creatinine Clearance: 25.9 mL/min (A) (based on SCr of 1.6 mg/dL (H)).,     Dialysis Method (if applicable):  N/A    CBC (last 72 hours):  Recent Labs   Lab Result Units 10/17/22  1200 10/18/22  0337   WBC K/uL 16.03* 19.62*   Hemoglobin g/dL 7.7* 7.3*   Hematocrit % 24.6* 22.4*   Platelets K/uL 397 406   Gran % % 68.3 76.9*   Lymph % % 18.7 10.2*   Mono % % 11.9 11.7   Eosinophil % % 0.4 0.1   Basophil % % 0.3 0.2   Differential Method  Automated Automated       Metabolic Panel (last 72 hours):  Recent Labs   Lab Result Units 10/17/22  1200 10/17/22  1437 10/18/22  0337   Sodium mmol/L 135*  --  135*   Potassium mmol/L 3.3*  --   3.6   Chloride mmol/L 100  --  104   CO2 mmol/L 24  --  20*   Glucose mg/dL 92  --  132*   Glucose, UA   --  Negative  --    BUN mg/dL 25*  --  21   Creatinine mg/dL 1.6*  --  1.6*   Albumin g/dL 3.0*  --  2.7*   Total Bilirubin mg/dL 0.9  --  1.0   Alkaline Phosphatase U/L 59  --  61   AST U/L 56*  --  100*   ALT U/L 29  --  51*   Magnesium mg/dL 1.9  --   --        Vancomycin Administrations:  vancomycin given in the last 96 hours                     vancomycin 1.25 g in dextrose 5% 250 mL IVPB (ready to mix) (mg) 1,250 mg New Bag 10/17/22 1300                    Microbiologic Results:  Microbiology Results (last 7 days)       Procedure Component Value Units Date/Time    Influenza A & B by Molecular [[id number]] Collected: 10/17/22 2308    Order Status: Completed Specimen: Nasal Swab Updated: 10/18/22 0010     Influenza A, Molecular Negative     Influenza B, Molecular Negative     Flu A & B Source Nasal swab    Blood culture #2 **CANNOT BE ORDERED STAT** [[id number]] Collected: 10/17/22 1201    Order Status: Completed Specimen: Blood from Peripheral, Antecubital, Left Updated: 10/17/22 1915     Blood Culture, Routine No Growth to date    Blood culture #1 **CANNOT BE ORDERED STAT** [[id number]] Collected: 10/17/22 1201    Order Status: Completed Specimen: Blood from Peripheral, Forearm, Left Updated: 10/17/22 1915     Blood Culture, Routine No Growth to date    Urine culture [[id number]] Collected: 10/17/22 1437    Order Status: No result Specimen: Urine Updated: 10/17/22 1523    Culture, Respiratory with Gram Stain [[id number]]     Order Status: No result Specimen: Respiratory from Sputum     Influenza A & B by Molecular [[id number]] Collected: 10/17/22 1023    Order Status: Canceled Specimen: Nasopharyngeal Swab
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
## 2022-10-18 NOTE — NURSING
CHIEF COMPLAINT    Chief Complaint   Patient presents with    Wound Check     HPI  Leldon Kussmaul is a 27 y.o. female with history of ADHD who presents to the ED with request for wound check. Patient had laceration repair performed on 05/09 for laceration to the volar aspect of the distal phalanx of left middle finger. She excellently cut it with a kitchen knife. Dermabond was applied after the wound was cleansed. Patient states that she was moving a couch at home this evening and the Dermabond peeled off of the hand. She continues to have pain in the finger since laceration as well as some paresthesias in the distal phalanx which were present after the initial laceration. Pain is rated as mild to moderate in severity and exacerbated palpation and movement. Nothing makes it better. She denies fevers, chills, nausea, vomiting. REVIEW OF SYSTEMS  Constitutional: No fever, chills or recent illness. Eye: No visual changes  HENT: No earache or sore throat. Resp: No SOB or productive cough. Cardio: No chest pain or palpitations. GI: No abdominal pain, nausea, vomiting, constipation or diarrhea. No melena. : No dysuria, urgency or frequency. Endocrine: No heat intolerance, no cold intolerance, no polydipsia   Lymphatics: No adenopathy  Musculoskeletal: Complains of pain to left middle finger  Neuro: No headaches. Psych: No homicidal or suicidal thoughts  Skin: No rash, No itching. ?  ?   PAST MEDICAL HISTORY  Past Medical History:   Diagnosis Date    ADHD (attention deficit hyperactivity disorder)     Mild preeclampsia delivered 1/10/2017     FAMILY HISTORY  Family History   Problem Relation Age of Onset    Cancer Mother     Heart Disease Mother     High Blood Pressure Father      SOCIAL HISTORY  Social History     Socioeconomic History    Marital status: Single     Spouse name: None    Number of children: None    Years of education: None    Highest education level: None   Occupational Patient awake, alert and oriented. Systolic BP remains elevated, AM provider notified, verbal permission given to administer 0900 medications early. Pt states that she takes all of her medications at 0500 when she wakes up and that taking them this late in the morning is why she is not on schedule.     Pt had once occurrence of emesis today, Tmax 100.0. Provider aware. Administered PRN antiemetic. Manual assessment of RR revealed rate of 18-22, not the reported 30s/40s on the monitor. Pt has had multiple loose bowel movements today. Ambulates with minimal assist to bathroom.    NADN. Denies any other complaints or needs at this time.       History    None   Tobacco Use    Smoking status: Current Every Day Smoker     Packs/day: 0.50     Years: 4.00     Pack years: 2.00     Types: Cigarettes    Smokeless tobacco: Former User   Vaping Use    Vaping Use: Former    Substances: Always   Substance and Sexual Activity    Alcohol use: No    Drug use: Yes     Frequency: 2.0 times per week     Types: Marijuana Niki Shelton)    Sexual activity: Yes     Partners: Female   Other Topics Concern    None   Social History Narrative    None     Social Determinants of Health     Financial Resource Strain:     Difficulty of Paying Living Expenses: Not on file   Food Insecurity:     Worried About Running Out of Food in the Last Year: Not on file    Ross of Food in the Last Year: Not on file   Transportation Needs:     Lack of Transportation (Medical): Not on file    Lack of Transportation (Non-Medical): Not on file   Physical Activity:     Days of Exercise per Week: Not on file    Minutes of Exercise per Session: Not on file   Stress:     Feeling of Stress : Not on file   Social Connections:     Frequency of Communication with Friends and Family: Not on file    Frequency of Social Gatherings with Friends and Family: Not on file    Attends [employer] Services: Not on file    Active Member of [address] [place] [address] [employer] or Organizations: Not on file    Attends Club or Organization Meetings: Not on file    Marital Status: Not on file   Intimate Partner Violence:     Fear of Current or Ex-Partner: Not on file    Emotionally Abused: Not on file    Physically Abused: Not on file    Sexually Abused: Not on file   Housing Stability:     Unable to Pay for Housing in the Last Year: Not on file    Number of [place] in the Last Year: Not on file    Unstable Housing in the Last Year: Not on file       SURGICAL HISTORY  History reviewed. No pertinent surgical history.   CURRENT MEDICATIONS  Previous Medications    ASPIRIN-ACETAMINOPHEN-CAFFEINE (EXCEDRIN MIGRAINE) 250-250-65 MG PER TABLET    Take 1 tablet by mouth every 6 hours as needed for Headaches    DIPHENHYDRAMINE (BENADRYL) 25 MG CAPSULE    Take 1 capsule by mouth every 4 hours as needed for Itching    PREDNISONE (DELTASONE) 10 MG TABLET    Take 6 tabs by mouth for 2 days, then take 5 tabs by mouth for 2 days, then take 4 tabs by mouth for 2 days, then take 3 tabs by mouth for 2 days, then take 2 tabs by mouth for 2 days, then take 1 tab by mouth for 2 days. TRIAMCINOLONE (KENALOG) 0.025 % CREAM    Apply Topically     ALLERGIES  No Known Allergies    Nursing notes reviewed by myself for past medical history, family history, social history, surgical history, current medications, and allergies. PHYSICAL EXAM  VITAL SIGNS: Triage VS:    ED Triage Vitals   Enc Vitals Group      BP       Pulse       Resp       Temp       Temp src       SpO2       Weight       Height       Head Circumference       Peak Flow       Pain Score       Pain Loc       Pain Edu? Excl. in [address]? Constitutional: Well developed, Well nourished, nontoxic appearing  HENT: Normocephalic, Atraumatic, Bilateral external ears normal, Nose normal.   Eyes: Conjunctiva normal, No discharge. No scleral icterus. Neck: Normal range of motion, No tenderness, Supple. Lymphatic: No lymphadenopathy noted. Cardiovascular: Normal heart rate  Thorax & Lungs: No respiratory distress  Skin: Warm, Dry, Pink, No mottling, No erythema, No rash. Extremities:  No cyanosis, Normal perfusion, No clubbing. Musculoskeletal: Small, nongaping laceration to the volar aspect of the distal phalanx of left middle finger. Full range of motion of flexion extension at the DIP with brisk capillary refill in all fingers of left hand. No surrounding erythema or drainage from the wound. Neurologic: Alert & oriented x 3,  No focal deficits noted. RADIOLOGY  Labs Reviewed - No data to display  I personally reviewed the images.  The radiologist's interpretation reveals:  Last Imaging results   No orders to display       MEDS GIVEN IN ED:  Medications   bacitracin ointment (has no administration in time range)     [address] [place] [address] Drive MAKING  24-year-old female presents emergency department with request for wound check. Had laceration to the volar aspect of left middle finger on 05/09 with Dermabond applied but unfortunate this fell off while she moved a couch this evening. On exam she has laceration present without any surrounding erythema or drainage. The left upper extremity is neurovascularly intact with full range of motion of flexion extension to all joints of the left middle finger. At this time we discussed that no further wound management is necessary other than keeping the wound clean and covered at home. Bacitracin over the wound. Discharged with a prescription for bacitracin. Return precautions provided        Amount and/or Complexity of Data Reviewed  Clinical lab tests: reviewed  Decide to obtain previous medical records or to obtain history from someone other than the patient: yes       -  Patient seen and evaluated in the emergency department. -  Triage and nursing notes reviewed and incorporated. -  Old chart records reviewed and incorporated. -  Work-up included:  See above  -  Results discussed with patient. Appropriate PPE utilized as indicated for entire patient encounter? Time of Disposition: See timeline  ? New Prescriptions    BACITRACIN 500 UNIT/GM OINTMENT    Apply topically 2 times daily. FINAL IMPRESSION  1. Visit for wound check        Electronically signed by:  [address], DO, 5/11/2022         [address], DO  05/11/22 1157

## 2022-10-18 NOTE — NURSING
2200 - received report for 77 y.o female admitted for PNA/sepsis who was hypertensive at admit with syt bps in the 180s-200s and tachy in 110s. The dayshift nurse had messaged the provider that her oral dose of hydralazine 100mg was not effective. Her PM dose was administered of the 100mg as well around 9pm and her current bp is 168/73 and her heart rate 120.- sent message to on call hospitalist. Pt was ordered metoprolol 25 mg.    0500- spoke with rapid responseLeisa, about patients RR in 30s/40s. Reached out to hospitalist on call about syt pressures rising again 180s syt as well as pt becoming tachycardic again in 100s.     0550- Hospitalist came to bedside to assess patient. Will review chart and see what needs to be done. Will pass this along to dayshift report.

## 2022-10-18 NOTE — CARE UPDATE
RAPID RESPONSE NURSE PROACTIVE ROUNDING NOTE       Time of Visit:     Admit Date: 10/17/2022  LOS: 1  Code Status: Full Code   Date of Visit: 10/18/2022  : 1945  Age: 77 y.o.  Sex: female  Race: Black or   Bed: 61367/90672 A:   MRN: [id number]  Was the patient discharged from an ICU this admission? No   Was the patient discharged from a PACU within last 24 hours? No   Did the patient receive conscious sedation/general anesthesia in last 24 hours? No  Was the patient in the ED within the past 24 hours? Yes  Was the patient on NIPPV within the past 24 hours? No   Attending Physician: Immanuel Peres MD  Primary Service: [facility] MED    Time spent at the bedside: < 15 min    SITUATION    Notified by Epic patient alert.  Reason for alert: Increased RR per EPIC  Called to evaluate the patient for Respiratory    BACKGROUND     Why is the patient in the hospital?: Sepsis due to pneumonia    Patient has a past medical history of Acute blood loss anemia, Allergy, Back pain, Chronic diastolic heart failure, Chronic diastolic heart failure, Colon polyp, Disorder of kidney and ureter, H/O Bell's palsy, Helicobacter pylori (H. pylori), HTN (hypertension), Hypothyroid, OA (osteoarthritis), DONAVAN (obstructive sleep apnea), Pneumonia due to other staphylococcus, Pulmonary HTN, Sepsis due to pneumonia, Trouble in sleeping, and Urinary incontinence.    Last Vitals:  Temp: 98.4 °F (36.9 °C) (10/17 2307)  Pulse: 104 (10/18 0350)  Resp: 20 (10/18 0350)  BP: 182/80 (10/18 0350)  SpO2: 95 % (10/18 0350)    24 Hours Vitals Range:  Temp:  [98.4 °F (36.9 °C)-99.3 °F (37.4 °C)]   Pulse:  [102-121]   Resp:  [20-39]   BP: (160-210)/(72-93)   SpO2:  [92 %-98 %]     Labs:  Recent Labs     10/17/22  1200 10/18/22  0337   WBC 16.03* 19.62*   HGB 7.7* 7.3*   HCT 24.6* 22.4*    406       Recent Labs     10/17/22  1200 10/18/22  033   * 135*   K 3.3* 3.6    104   CO2 24 20*   CREATININE 1.6* 1.6*   GLU 92  132*   MG 1.9  --         No results for input(s): PH, PCO2, PO2, HCO3, POCSATURATED, BE in the last 72 hours.     ASSESSMENT    Physical Exam  Pt awake alert. Talkative. Pt hr low 100's. RR mid 20's. O2 sat 96% 2 L NC.  Patchy infiltrates noted to chest xray. Covid/Flu negative. Blood and urine cultures done. Cefepime scheduled, and 1 time Vanc in ED. Respiratory lead placement adjusted; however RR rate per monitor not accurate no matter the placement. Unfiled RR inaccurate. Pt not in in acute resp distress. Respiratory even and unlabored.     INTERVENTIONS    The patient was seen for Respiratory problem. Staff concerns included tachypnea. The following interventions were performed: continued pulse ox monitoring continued and continued cardiac monitoring continued.    RECOMMENDATIONS    None at this time    PROVIDER ESCALATION    Yes/No  no    Orders received and case discussed with NA.    Disposition: Remain in room 40666.    FOLLOW-UP    Bedside Trudy COLUNGA  updated on plan of care. Instructed to call the Rapid Response Nurse, Leisa [place] RN at [phone] for additional questions or concerns.

## 2022-10-18 NOTE — ASSESSMENT & PLAN NOTE
Multifocal pneumonia    This patient does have evidence of infective focus  My overall impression is sepsis. Vital signs were reviewed and noted in progress note.  2/4 SIRS: tachycardia and leukocytosis  On 5L NC, wean as tolerated  Antibiotics given-   Antibiotics (From admission, onward)    Start     Stop Route Frequency Ordered    10/18/22 0900  mupirocin 2 % ointment         10/23 0859 Nasl 2 times daily 10/17/22 2236    10/18/22 0200  cefepime in dextrose 5 % 1 gram/50 mL IVPB 1 g         -- IV Every 12 hours (non-standard times) 10/17/22 1423    10/17/22 1201  vancomycin - pharmacy to dose  (vancomycin IVPB)        See Hyperspace for full Linked Orders Report.    -- IV pharmacy to manage frequency 10/17/22 1102        Cultures were taken-   Microbiology Results (last 7 days)     Procedure Component Value Units Date/Time    Influenza A & B by Molecular [[id number]] Collected: 10/17/22 2308    Order Status: Completed Specimen: Nasal Swab Updated: 10/18/22 0010     Influenza A, Molecular Negative     Influenza B, Molecular Negative     Flu A & B Source Nasal swab    Blood culture #2 **CANNOT BE ORDERED STAT** [[id number]] Collected: 10/17/22 1201    Order Status: Completed Specimen: Blood from Peripheral, Antecubital, Left Updated: 10/17/22 1915     Blood Culture, Routine No Growth to date    Blood culture #1 **CANNOT BE ORDERED STAT** [[id number]] Collected: 10/17/22 1201    Order Status: Completed Specimen: Blood from Peripheral, Forearm, Left Updated: 10/17/22 1915     Blood Culture, Routine No Growth to date    Urine culture [[id number]] Collected: 10/17/22 1437    Order Status: No result Specimen: Urine Updated: 10/17/22 1523    Culture, Respiratory with Gram Stain [[id number]]     Order Status: No result Specimen: Respiratory from Sputum     Influenza A & B by Molecular [[id number]] Collected: 10/17/22 1023    Order Status: Canceled Specimen: Nasopharyngeal Swab         Latest lactate reviewed, they are-  Recent  Labs   Lab 10/17/22  1200   LACTATE 0.9       Organ dysfunction indicated by Acute kidney injury and elevated troponin  Source- mulitfocal pneunoma    Source control Achieved by- vanc and cefepime

## 2022-10-19 PROBLEM — K92.2 ACUTE GI BLEEDING: Status: ACTIVE | Noted: 2022-10-19

## 2022-10-19 LAB
ABO + RH BLD: NORMAL
ALBUMIN SERPL BCP-MCNC: 2.1 G/DL (ref 3.5–5.2)
ALP SERPL-CCNC: 61 U/L (ref 55–135)
ALT SERPL W/O P-5'-P-CCNC: 62 U/L (ref 10–44)
ANION GAP SERPL CALC-SCNC: 10 MMOL/L (ref 8–16)
AST SERPL-CCNC: 100 U/L (ref 10–40)
BASOPHILS # BLD AUTO: 0.01 K/UL (ref 0–0.2)
BASOPHILS NFR BLD: 0 % (ref 0–1.9)
BILIRUB SERPL-MCNC: 0.8 MG/DL (ref 0.1–1)
BLD GP AB SCN CELLS X3 SERPL QL: NORMAL
BLD PROD TYP BPU: NORMAL
BLD PROD TYP BPU: NORMAL
BLOOD UNIT EXPIRATION DATE: NORMAL
BLOOD UNIT EXPIRATION DATE: NORMAL
BLOOD UNIT TYPE CODE: 7300
BLOOD UNIT TYPE CODE: 7300
BLOOD UNIT TYPE: NORMAL
BLOOD UNIT TYPE: NORMAL
BUN SERPL-MCNC: 28 MG/DL (ref 8–23)
CALCIUM SERPL-MCNC: 7.6 MG/DL (ref 8.7–10.5)
CHLORIDE SERPL-SCNC: 100 MMOL/L (ref 95–110)
CO2 SERPL-SCNC: 18 MMOL/L (ref 23–29)
CODING SYSTEM: NORMAL
CODING SYSTEM: NORMAL
CREAT SERPL-MCNC: 2 MG/DL (ref 0.5–1.4)
DIFFERENTIAL METHOD: ABNORMAL
DISPENSE STATUS: NORMAL
DISPENSE STATUS: NORMAL
EOSINOPHIL # BLD AUTO: 0 K/UL (ref 0–0.5)
EOSINOPHIL NFR BLD: 0 % (ref 0–8)
ERYTHROCYTE [DISTWIDTH] IN BLOOD BY AUTOMATED COUNT: 13.4 % (ref 11.5–14.5)
EST. GFR  (NO RACE VARIABLE): 25.6 ML/MIN/1.73 M^2
GLUCOSE SERPL-MCNC: 95 MG/DL (ref 70–110)
HCT VFR BLD AUTO: 18.4 % (ref 37–48.5)
HGB BLD-MCNC: 6 G/DL (ref 12–16)
HYPOCHROMIA BLD QL SMEAR: ABNORMAL
IMM GRANULOCYTES # BLD AUTO: 0.32 K/UL (ref 0–0.04)
IMM GRANULOCYTES NFR BLD AUTO: 1.5 % (ref 0–0.5)
LYMPHOCYTES # BLD AUTO: 1.4 K/UL (ref 1–4.8)
LYMPHOCYTES NFR BLD: 6.7 % (ref 18–48)
MCH RBC QN AUTO: 28.4 PG (ref 27–31)
MCHC RBC AUTO-ENTMCNC: 32.6 G/DL (ref 32–36)
MCV RBC AUTO: 87 FL (ref 82–98)
MONOCYTES # BLD AUTO: 2.3 K/UL (ref 0.3–1)
MONOCYTES NFR BLD: 10.7 % (ref 4–15)
NEUTROPHILS # BLD AUTO: 17.5 K/UL (ref 1.8–7.7)
NEUTROPHILS NFR BLD: 81.1 % (ref 38–73)
NRBC BLD-RTO: 0 /100 WBC
PLATELET # BLD AUTO: 353 K/UL (ref 150–450)
PLATELET BLD QL SMEAR: ABNORMAL
PMV BLD AUTO: 11.2 FL (ref 9.2–12.9)
POIKILOCYTOSIS BLD QL SMEAR: SLIGHT
POTASSIUM SERPL-SCNC: 3.5 MMOL/L (ref 3.5–5.1)
PROT SERPL-MCNC: 6.3 G/DL (ref 6–8.4)
RBC # BLD AUTO: 2.11 M/UL (ref 4–5.4)
SODIUM SERPL-SCNC: 128 MMOL/L (ref 136–145)
TARGETS BLD QL SMEAR: ABNORMAL
TOXIC GRANULES BLD QL SMEAR: PRESENT
TRANS ERYTHROCYTES VOL PATIENT: NORMAL ML
TRANS ERYTHROCYTES VOL PATIENT: NORMAL ML
VANCOMYCIN SERPL-MCNC: 14 UG/ML
WBC # BLD AUTO: 21.59 K/UL (ref 3.9–12.7)

## 2022-10-19 PROCEDURE — 80053 COMPREHEN METABOLIC PANEL: CPT

## 2022-10-19 PROCEDURE — 63600175 PHARM REV CODE 636 W HCPCS: Performed by: HOSPITALIST

## 2022-10-19 PROCEDURE — 36415 COLL VENOUS BLD VENIPUNCTURE: CPT | Performed by: NURSE PRACTITIONER

## 2022-10-19 PROCEDURE — 63600175 PHARM REV CODE 636 W HCPCS

## 2022-10-19 PROCEDURE — 63600175 PHARM REV CODE 636 W HCPCS: Performed by: NURSE PRACTITIONER

## 2022-10-19 PROCEDURE — 99222 1ST HOSP IP/OBS MODERATE 55: CPT | Mod: ,,, | Performed by: INTERNAL MEDICINE

## 2022-10-19 PROCEDURE — 99232 SBSQ HOSP IP/OBS MODERATE 35: CPT | Mod: ,,, | Performed by: HOSPITALIST

## 2022-10-19 PROCEDURE — 83735 ASSAY OF MAGNESIUM: CPT | Performed by: NURSE PRACTITIONER

## 2022-10-19 PROCEDURE — 94761 N-INVAS EAR/PLS OXIMETRY MLT: CPT

## 2022-10-19 PROCEDURE — 80202 ASSAY OF VANCOMYCIN: CPT | Performed by: HOSPITALIST

## 2022-10-19 PROCEDURE — 25000003 PHARM REV CODE 250: Performed by: INTERNAL MEDICINE

## 2022-10-19 PROCEDURE — 87205 SMEAR GRAM STAIN: CPT

## 2022-10-19 PROCEDURE — 80053 COMPREHEN METABOLIC PANEL: CPT | Mod: 91 | Performed by: NURSE PRACTITIONER

## 2022-10-19 PROCEDURE — 25000003 PHARM REV CODE 250: Performed by: HOSPITALIST

## 2022-10-19 PROCEDURE — P9021 RED BLOOD CELLS UNIT: HCPCS | Performed by: HOSPITALIST

## 2022-10-19 PROCEDURE — 87070 CULTURE OTHR SPECIMN AEROBIC: CPT

## 2022-10-19 PROCEDURE — 25000003 PHARM REV CODE 250

## 2022-10-19 PROCEDURE — 99222 PR INITIAL HOSPITAL CARE,LEVL II: ICD-10-PCS | Mod: ,,, | Performed by: INTERNAL MEDICINE

## 2022-10-19 PROCEDURE — 86920 COMPATIBILITY TEST SPIN: CPT | Performed by: HOSPITALIST

## 2022-10-19 PROCEDURE — 36430 TRANSFUSION BLD/BLD COMPNT: CPT

## 2022-10-19 PROCEDURE — 36415 COLL VENOUS BLD VENIPUNCTURE: CPT | Performed by: HOSPITALIST

## 2022-10-19 PROCEDURE — 99900035 HC TECH TIME PER 15 MIN (STAT)

## 2022-10-19 PROCEDURE — 85025 COMPLETE CBC W/AUTO DIFF WBC: CPT

## 2022-10-19 PROCEDURE — 99232 PR SUBSEQUENT HOSPITAL CARE,LEVL II: ICD-10-PCS | Mod: ,,, | Performed by: HOSPITALIST

## 2022-10-19 PROCEDURE — C9113 INJ PANTOPRAZOLE SODIUM, VIA: HCPCS | Performed by: NURSE PRACTITIONER

## 2022-10-19 PROCEDURE — 86901 BLOOD TYPING SEROLOGIC RH(D): CPT | Performed by: HOSPITALIST

## 2022-10-19 PROCEDURE — 20600001 HC STEP DOWN PRIVATE ROOM

## 2022-10-19 PROCEDURE — 27000221 HC OXYGEN, UP TO 24 HOURS

## 2022-10-19 PROCEDURE — 36415 COLL VENOUS BLD VENIPUNCTURE: CPT

## 2022-10-19 RX ORDER — HYDROCODONE BITARTRATE AND ACETAMINOPHEN 500; 5 MG/1; MG/1
TABLET ORAL
Status: DISCONTINUED | OUTPATIENT
Start: 2022-10-19 | End: 2022-11-15

## 2022-10-19 RX ORDER — PANTOPRAZOLE SODIUM 40 MG/10ML
80 INJECTION, POWDER, LYOPHILIZED, FOR SOLUTION INTRAVENOUS ONCE
Status: COMPLETED | OUTPATIENT
Start: 2022-10-19 | End: 2022-10-19

## 2022-10-19 RX ORDER — PANTOPRAZOLE SODIUM 40 MG/10ML
40 INJECTION, POWDER, LYOPHILIZED, FOR SOLUTION INTRAVENOUS 2 TIMES DAILY
Status: DISCONTINUED | OUTPATIENT
Start: 2022-10-20 | End: 2022-10-24

## 2022-10-19 RX ORDER — MAG HYDROX/ALUMINUM HYD/SIMETH 200-200-20
30 SUSPENSION, ORAL (FINAL DOSE FORM) ORAL
Status: DISCONTINUED | OUTPATIENT
Start: 2022-10-19 | End: 2022-10-24

## 2022-10-19 RX ORDER — SUCRALFATE 1 G/10ML
1 SUSPENSION ORAL EVERY 6 HOURS
Status: DISCONTINUED | OUTPATIENT
Start: 2022-10-19 | End: 2022-10-26

## 2022-10-19 RX ADMIN — ALUMINUM HYDROXIDE, MAGNESIUM HYDROXIDE, AND DIMETHICONE 30 ML: 200; 20; 200 SUSPENSION ORAL at 09:10

## 2022-10-19 RX ADMIN — CLONIDINE HYDROCHLORIDE 0.2 MG: 0.1 TABLET ORAL at 02:10

## 2022-10-19 RX ADMIN — AMLODIPINE BESYLATE 10 MG: 10 TABLET ORAL at 09:10

## 2022-10-19 RX ADMIN — SUCRALFATE 1 G: 1 SUSPENSION ORAL at 05:10

## 2022-10-19 RX ADMIN — HYDRALAZINE HYDROCHLORIDE 100 MG: 50 TABLET ORAL at 02:10

## 2022-10-19 RX ADMIN — ACETAMINOPHEN 650 MG: 325 TABLET ORAL at 02:10

## 2022-10-19 RX ADMIN — SUCRALFATE 1 G: 1 SUSPENSION ORAL at 12:10

## 2022-10-19 RX ADMIN — HYDRALAZINE HYDROCHLORIDE 100 MG: 50 TABLET ORAL at 09:10

## 2022-10-19 RX ADMIN — METOPROLOL SUCCINATE 50 MG: 50 TABLET, EXTENDED RELEASE ORAL at 09:10

## 2022-10-19 RX ADMIN — ALUMINUM HYDROXIDE, MAGNESIUM HYDROXIDE, AND DIMETHICONE 30 ML: 200; 20; 200 SUSPENSION ORAL at 02:10

## 2022-10-19 RX ADMIN — CEFEPIME HYDROCHLORIDE 1 G: 1 INJECTION, SOLUTION INTRAVENOUS at 01:10

## 2022-10-19 RX ADMIN — ACETAMINOPHEN 1000 MG: 500 TABLET ORAL at 09:10

## 2022-10-19 RX ADMIN — MUPIROCIN: 20 OINTMENT TOPICAL at 09:10

## 2022-10-19 RX ADMIN — VANCOMYCIN HYDROCHLORIDE 500 MG: 500 INJECTION, POWDER, LYOPHILIZED, FOR SOLUTION INTRAVENOUS at 09:10

## 2022-10-19 RX ADMIN — PANTOPRAZOLE SODIUM 80 MG: 40 INJECTION, POWDER, FOR SOLUTION INTRAVENOUS at 09:10

## 2022-10-19 RX ADMIN — GUAIFENESIN AND DEXTROMETHORPHAN HYDROBROMIDE 1 TABLET: 600; 30 TABLET, EXTENDED RELEASE ORAL at 09:10

## 2022-10-19 RX ADMIN — LEVOTHYROXINE SODIUM 25 MCG: 25 TABLET ORAL at 06:10

## 2022-10-19 RX ADMIN — CLONIDINE HYDROCHLORIDE 0.2 MG: 0.1 TABLET ORAL at 06:10

## 2022-10-19 RX ADMIN — CEFEPIME HYDROCHLORIDE 1 G: 1 INJECTION, SOLUTION INTRAVENOUS at 05:10

## 2022-10-19 RX ADMIN — Medication 1 CAPSULE: at 12:10

## 2022-10-19 NOTE — CODE/ RAPID DOCUMENTATION
RAPID RESPONSE NURSE ROUND       Rounding completed with charge RN, Gila for elevated wbc reports NAD. No additional concerns verbalized at this time. Instructed to call [phone] for further concerns or assistance.

## 2022-10-19 NOTE — PT/OT/SLP PROGRESS
Physical Therapy      Patient Name:  Kristin Goodman   MRN:  [id number]    Patient not seen today secondary to  (nursing hold due to low hemoglobin and patient will receive 2 units of blood today.). Will follow-up next day.

## 2022-10-19 NOTE — ASSESSMENT & PLAN NOTE
Starting having melena,HH is dropped,will transfuse 2 PRBC,started on Protonix gtt and consulted GI.

## 2022-10-19 NOTE — CONSULTS
Single lumen 18g x 10 cm midline placed to left brachial vein. Max dwell date 11/16/22, Lot# [id number].  Needle advanced into the vessel under real time ultrasound guidance.  Image recorded and saved.

## 2022-10-19 NOTE — PROGRESS NOTES
Pharmacokinetic Assessment Follow Up: IV Vancomycin    Vancomycin serum concentration assessment(s):    The random level was drawn correctly and can be used to guide therapy at this time. The measurement is below the desired definitive target range of 15 to 20 mcg/mL.    Vancomycin Regimen Plan:    Patients serum creatinine continues to rise  Will plan to repeat the 500 mg IV vancomycin dose this morning  Plan to follow up with another VR with 10/20 AM labs  Goal 15-20    Drug levels (last 3 results):  Recent Labs   Lab Result Units 10/18/22  1246 10/19/22  0507   Vancomycin, Random ug/mL 12.2 14.0     Pharmacy will continue to follow and monitor vancomycin.    Please contact pharmacy at extension [phone] for questions regarding this assessment.    Thank you for the consult,   Lexx Tapia, PharmD, [facility]      Patient brief summary:  Kristin Goodman is a 77 y.o. female initiated on antimicrobial therapy with IV Vancomycin for treatment of lower respiratory infection    Drug Allergies:   Review of patient's allergies indicates:   Allergen Reactions    Ampicillin     Penicillins      Other reaction(s): Hives    Sulfa (sulfonamide antibiotics) Rash and Hives       Actual Body Weight:   63.5 kg    Renal Function:   Estimated Creatinine Clearance: 20.8 mL/min (A) (based on SCr of 2 mg/dL (H)).,     Dialysis Method (if applicable):  N/A    CBC (last 72 hours):  Recent Labs   Lab Result Units 10/17/22  1200 10/18/22  0337   WBC K/uL 16.03* 19.62*   Hemoglobin g/dL 7.7* 7.3*   Hematocrit % 24.6* 22.4*   Platelets K/uL 397 406   Gran % % 68.3 76.9*   Lymph % % 18.7 10.2*   Mono % % 11.9 11.7   Eosinophil % % 0.4 0.1   Basophil % % 0.3 0.2   Differential Method  Automated Automated       Metabolic Panel (last 72 hours):  Recent Labs   Lab Result Units 10/17/22  1200 10/17/22  1437 10/18/22  0337 10/19/22  0507   Sodium mmol/L 135*  --  135* 128*   Potassium mmol/L 3.3*  --  3.6 3.5   Chloride mmol/L 100  --  104 100   CO2  mmol/L 24  --  20* 18*   Glucose mg/dL 92  --  132* 95   Glucose, UA   --  Negative  --   --    BUN mg/dL 25*  --  21 28*   Creatinine mg/dL 1.6*  --  1.6* 2.0*   Albumin g/dL 3.0*  --  2.7* 2.1*   Total Bilirubin mg/dL 0.9  --  1.0 0.8   Alkaline Phosphatase U/L 59  --  61 61   AST U/L 56*  --  100* 100*   ALT U/L 29  --  51* 62*   Magnesium mg/dL 1.9  --   --   --        Vancomycin Administrations:  vancomycin given in the last 96 hours                     vancomycin 500 mg in dextrose 5 % 100 mL IVPB (ready to mix system) (mg) 500 mg New Bag 10/18/22 1603    vancomycin 1.25 g in dextrose 5% 250 mL IVPB (ready to mix) (mg) 1,250 mg New Bag 10/17/22 1300                    Microbiologic Results:  Microbiology Results (last 7 days)       Procedure Component Value Units Date/Time    Urine culture [[id number]] Collected: 10/17/22 1437    Order Status: Completed Specimen: Urine Updated: 10/18/22 2214     Urine Culture, Routine No growth    Narrative:      Specimen Source->Urine    Blood culture #2 **CANNOT BE ORDERED STAT** [[id number]] Collected: 10/17/22 1201    Order Status: Completed Specimen: Blood from Peripheral, Antecubital, Left Updated: 10/18/22 1412     Blood Culture, Routine No Growth to date      No Growth to date    Blood culture #1 **CANNOT BE ORDERED STAT** [[id number]] Collected: 10/17/22 1201    Order Status: Completed Specimen: Blood from Peripheral, Forearm, Left Updated: 10/18/22 1412     Blood Culture, Routine No Growth to date      No Growth to date    Influenza A & B by Molecular [[id number]] Collected: 10/17/22 2308    Order Status: Completed Specimen: Nasal Swab Updated: 10/18/22 0010     Influenza A, Molecular Negative     Influenza B, Molecular Negative     Flu A & B Source Nasal swab    Culture, Respiratory with Gram Stain [[id number]]     Order Status: No result Specimen: Respiratory from Sputum     Influenza A & B by Molecular [[id number]] Collected: 10/17/22 1023    Order Status: Canceled  Specimen: Nasopharyngeal Swab

## 2022-10-19 NOTE — PLAN OF CARE
Problem: Adult Inpatient Plan of Care  Goal: Plan of Care Review  Outcome: Ongoing, Progressing  Goal: Patient-Specific Goal (Individualized)  Outcome: Ongoing, Progressing  Goal: Absence of Hospital-Acquired Illness or Injury  Outcome: Ongoing, Progressing  Goal: Optimal Comfort and Wellbeing  Outcome: Ongoing, Progressing  Goal: Readiness for Transition of Care  Outcome: Ongoing, Progressing     Problem: Infection  Goal: Absence of Infection Signs and Symptoms  Outcome: Ongoing, Progressing     Problem: Adjustment to Illness (Sepsis/Septic Shock)  Goal: Optimal Coping  Outcome: Ongoing, Progressing     Problem: Bleeding (Sepsis/Septic Shock)  Goal: Absence of Bleeding  Outcome: Ongoing, Progressing     Problem: Glycemic Control Impaired (Sepsis/Septic Shock)  Goal: Blood Glucose Level Within Desired Range  Outcome: Ongoing, Progressing     Problem: Infection Progression (Sepsis/Septic Shock)  Goal: Absence of Infection Signs and Symptoms  Outcome: Ongoing, Progressing     Problem: Nutrition Impaired (Sepsis/Septic Shock)  Goal: Optimal Nutrition Intake  Outcome: Ongoing, Progressing     Problem: Fluid and Electrolyte Imbalance (Acute Kidney Injury/Impairment)  Goal: Fluid and Electrolyte Balance  Outcome: Ongoing, Progressing     Problem: Oral Intake Inadequate (Acute Kidney Injury/Impairment)  Goal: Optimal Nutrition Intake  Outcome: Ongoing, Progressing     Problem: Renal Function Impairment (Acute Kidney Injury/Impairment)  Goal: Effective Renal Function  Outcome: Ongoing, Progressing     Problem: Fluid Imbalance (Pneumonia)  Goal: Fluid Balance  Outcome: Ongoing, Progressing     Problem: Infection (Pneumonia)  Goal: Resolution of Infection Signs and Symptoms  Outcome: Ongoing, Progressing     Problem: Respiratory Compromise (Pneumonia)  Goal: Effective Oxygenation and Ventilation  Outcome: Ongoing, Progressing     Problem: Fall Injury Risk  Goal: Absence of Fall and Fall-Related Injury  Outcome: Ongoing,  Progressing

## 2022-10-19 NOTE — AI DETERIORATION ALERT
"RAPID RESPONSE NURSE AI ALERT       AI alert received.    Chart Reviewed: 10/18/2022, 8:10 PM    MRN: [id number]  Bed: 82781/35008 A    Dx: Sepsis due to pneumonia    Kristin Goodman has a past medical history of Acute blood loss anemia, Allergy, Back pain, Chronic diastolic heart failure, Chronic diastolic heart failure, Colon polyp, Disorder of kidney and ureter, H/O Bell's palsy, Helicobacter pylori (H. pylori), HTN (hypertension), Hypothyroid, OA (osteoarthritis), DONAVAN (obstructive sleep apnea), Pneumonia due to other staphylococcus, Pulmonary HTN, Sepsis due to pneumonia, Trouble in sleeping, and Urinary incontinence.    Last VS: BP (!) 157/69 (BP Location: Right arm, Patient Position: Lying)   Pulse 96   Temp (!) 100.4 °F (38 °C)   Resp (!) 27   Ht 5' 1" (1.549 m)   Wt 63.5 kg (139 lb 15.9 oz)   SpO2 (!) 94%   BMI 26.45 kg/m²     24H Vital Sign Range:  Temp:  [98.1 °F (36.7 °C)-100.4 °F (38 °C)]   Pulse:  []   Resp:  [18-27]   BP: (151-182)/(67-80)   SpO2:  [94 %-99 %]     Level of Consciousness (AVPU): alert    Recent Labs     10/17/22  1200 10/18/22  0337   WBC 16.03* 19.62*   HGB 7.7* 7.3*   HCT 24.6* 22.4*    406       Recent Labs     10/17/22  1200 10/18/22  0337   * 135*   K 3.3* 3.6    104   CO2 24 20*   CREATININE 1.6* 1.6*   GLU 92 132*   MG 1.9  --         No results for input(s): PH, PCO2, PO2, HCO3, POCSATURATED, BE in the last 72 hours.     OXYGEN:  Flow (L/min): 2     O2 Device (Oxygen Therapy): High Flow nasal Cannula    MEWS score: 2    Bedside RNMarianela  contacted. No concerns verbalized at this time. Instructed to call [phone] for further concerns or assistance.    Alise Singh RN         "
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
## 2022-10-19 NOTE — PROGRESS NOTES
I have reviewed the notes, assessments, and/or procedures performed this visit, and I concur with the documentation.    As noted.  Reports melena about 2 weeks ago which seemed to resolve and returned to normal brown stool.  Also 2 weeks ago and again more recently she had episodes of hemoptysis.  She denies any emesis of coffee-ground material.  She further denies any indigestion or other GI symptoms.  She did have a significant fall in her hemoglobin and is receiving transfusion now.  We considered doing EGD now, but from a pulmonary standpoint I do not think she is stable enough with the current pneumonia, therefore would recommend that we just follow the patient, treat with a PPI in case there is any peptic ulcer disease.  If anemia persist, or there is ongoing signs of GI blood loss, then we can reconsider GI evaluation based on her pulmonary status and function

## 2022-10-19 NOTE — ASSESSMENT & PLAN NOTE
- patient's anemia is currently uncontrolled. S/p 0 units of PRBCs.   - etiology likely due to hemoptysis  - monitor closely  - current CBC reviewed -   Lab Results   Component Value Date    HGB 6.0 (L) 10/19/2022    HCT 18.4 (LL) 10/19/2022     - monitor serial CBC and transfuse if patient becomes hemodynamically unstable, symptomatic, or H/H drops below 7/21.     As above,

## 2022-10-19 NOTE — NURSING
Early on in shift pt had came back to the bed from going to the restroom and forgot to place o2 back on. Pt desatted to 79%, O2 was out back on and increased to 3.5 L NC from 2L NC. Pt was able to recover and maintain saturations in the mids 90s.   Pt also complained of slight discomfort in the middle of her chest that resolved on its own.   Later on throughout the night pt SBP was 169 and pt complained of discomfort in her back.  mg Tylenol was given along with PRN clonidine 0.2 mg to treat BP. (With goals to keep SBP < 160)  Reassessment of clonidine showed medication to be therapeutic with a BP of 108/53. Pt comfortable and resting. Will continue to monitor pt for remaining of shift.     Also gave pt incentive spirometer and educated her how to use. Pt demonstrated use with only being able to reach 500. Goal is 1000

## 2022-10-19 NOTE — CONSULTS
[facility]  Gastroenterology  Consult Note    Patient Name: Kristin Goodman  MRN: [id number]  Admission Date: 10/17/2022  Hospital Length of Stay: 2 days  Code Status: Full Code   Attending Provider: Madie Lancaster MD   Consulting Provider: Gilson Larry MD  Primary Care Physician: Lori Hernandez MD  Principal Problem:Sepsis due to pneumonia    Inpatient consult to Gastroenterology  Consult performed by: Gilson Larry MD  Consult ordered by: Madie Lancaster MD      Subjective:     HPI: Kristin Goodman is a 77 y.o. female with history of HTN, hypothyroidism, HFpEF, osteoarthritis who presents for fevers and SOB. Found to have multifocal PNA. GI consulted with concern for GIB. She reports she did have some melena one week ago but has had brown stools since. Last Bm yesterday was liquid brown. Had some nausea/vomiting yesterday, no hematemesis. Denies abdominal pain. Was having hemoptysis prior to presentation and while in ED yesterday, improving as of today. Febrile last night, currently on broad spectrum antibiotics. Colonoscopy in 2020 showed internal hemorrhoids and mild sigmoid diverticulosis. EGD in 2012 showed gastritis, biopsies positive for H. Pylori.        Past Medical History:   Diagnosis Date    Acute blood loss anemia 10/17/2022    Allergy     Back pain     Chronic diastolic heart failure 8/31/2020    Chronic diastolic heart failure 8/31/2020    Colon polyp     Disorder of kidney and ureter     H/O Bell's palsy 2006    after [place]    Helicobacter pylori (H. pylori)     HTN (hypertension)     Hypothyroid     OA (osteoarthritis)     DONAVAN (obstructive sleep apnea) 11/9/2020    Pneumonia due to other staphylococcus     Pulmonary HTN 8/31/2020    Sepsis due to pneumonia 10/17/2022    Trouble in sleeping     Urinary incontinence        Past Surgical History:   Procedure Laterality Date    ARTHROSCOPIC CHONDROPLASTY OF KNEE JOINT Right 12/21/2021    Procedure:  ARTHROSCOPY, KNEE, WITH CHONDROPLASTY;  Surgeon: Elly Sullivan MD;  Location: [facility] OR;  Service: Orthopedics;  Laterality: Right;    COLONOSCOPY N/A 9/28/2020    Procedure: COLONOSCOPY;  Surgeon: Jaylan Flynn MD;  Location: [facility] ENDO;  Service: Endoscopy;  Laterality: N/A;    KNEE ARTHROSCOPY W/ MENISCECTOMY Right 12/21/2021    Procedure: ARTHROSCOPY, KNEE, WITH MENISCECTOMY;  Surgeon: Elly Sullivan MD;  Location: [facility] OR;  Service: Orthopedics;  Laterality: Right;  general, regional w catheter, adductor, josefina 50cc,     OOPHORECTOMY      SYNOVECTOMY OF KNEE Right 12/21/2021    Procedure: SYNOVECTOMY, KNEE;  Surgeon: Elly Sullivan MD;  Location: [facility] OR;  Service: Orthopedics;  Laterality: Right;    TOTAL ABDOMINAL HYSTERECTOMY      19 yrs ago       Family History   Problem Relation Age of Onset    Arthritis Mother     Early death Mother 56    Hypertension Mother     Diabetes Father     Early death Father 62    Hypertension Father     Stroke Father     Arthritis Sister     Cancer Sister         cervical    Early death Sister 63    Heart disease Sister         anyuresem    Hypertension Sister     Hyperlipidemia Sister     Alzheimer's disease Sister     Rheum arthritis Sister     Arthritis Brother     Cancer Brother         lung cancer    Early death Brother 59    Heart disease Brother         heart attack    Hypertension Brother     Vision loss Brother     Prostate cancer Brother     Hypertension Daughter     Breast cancer Other        Social History     Socioeconomic History    Marital status:    Tobacco Use    Smoking status: Former     Packs/day: 0.50     Years: 6.00     Pack years: 3.00     Types: Cigarettes    Smokeless tobacco: Never    Tobacco comments:     Quit ~ 30 years ago   Substance and Sexual Activity    Alcohol use: No    Drug use: No    Sexual activity: Never     Partners: Male       Current Facility-Administered Medications on File Prior to Encounter   Medication Dose Route Frequency  Provider Last Rate Last Admin    celecoxib capsule 400 mg  400 mg Oral Once Dieudonne Marshall MD        fentaNYL 50 mcg/mL injection  mcg   mcg Intravenous PRN Dieudonne Marshall MD   100 mcg at 12/21/21 0919    LIDOcaine (PF) 10 mg/ml (1%) injection 10 mg  1 mL Intradermal Once PRN Dieudonne Marshall MD        LIDOcaine (PF) 10 mg/ml (1%) injection 10 mg  1 mL Intradermal Once Dieudonne Marshall MD        midazolam (VERSED) 1 mg/mL injection 0.5-4 mg  0.5-4 mg Intravenous PRN Dieudonne Marshall MD   2 mg at 12/21/21 0919    ropivacaine 0.2% [facility] PainPRO Pump infusion 500 ML   Perineural Continuous Dieudonne Marshall MD   New Bag at 12/21/21 1153     Current Outpatient Medications on File Prior to Encounter   Medication Sig Dispense Refill    ACETAMINOPHEN (TYLENOL ARTHRITIS PAIN ORAL) Take 1 tablet by mouth once daily.       albuterol (VENTOLIN HFA) 90 mcg/actuation inhaler Inhale 2 puffs into the lungs every 6 (six) hours as needed for Shortness of Breath. Rescue 18 g 0    amLODIPine (NORVASC) 10 MG tablet Take 1 tablet (10 mg total) by mouth once daily. 90 tablet 3    aspirin 325 MG tablet Take 1 tablet at lunch daily starting after surgery for 6 weeks to prevent DVT. 42 tablet 0    diclofenac sodium (VOLTAREN) 1 % Gel Apply 2 g topically 4 (four) times daily. for 10 days 400 g 0    epinastine 0.05 % ophthalmic solution Place 1 drop into both eyes once daily.       EUTHYROX 25 mcg tablet Take 1 tablet by mouth once daily 90 tablet 0    fish,bora,flax oils-om3,6,9no1 (OMEGA 3-6-9) 1,200 mg Cap Take 1 each by mouth once daily. 30 capsule 3    fluticasone propionate (FLONASE) 50 mcg/actuation nasal spray 1 spray (50 mcg total) by Each Nostril route 2 (two) times daily. 16 g 0    FLUZONE HIGHDOSE QUAD 20-21  mcg/0.7 mL Syrg ADM 0.7ML IM UTD      hydrALAZINE (APRESOLINE) 100 MG tablet TAKE 1 TABLET BY MOUTH THREE TIMES DAILY 90 tablet 0    HYDROcodone-acetaminophen (NORCO) 5-325 mg per  tablet Take 1 tablet by mouth every 6 (six) hours as needed.      ibuprofen (ADVIL,MOTRIN) 800 MG tablet Take 800 mg by mouth every 8 (eight) hours as needed.      levocetirizine (XYZAL) 5 MG tablet Take 1 tablet (5 mg total) by mouth every evening. For sinus 30 tablet 0    lisinopriL (PRINIVIL,ZESTRIL) 40 MG tablet Take 1 tablet by mouth once daily 90 tablet 0    meloxicam (MOBIC) 15 MG tablet Take 1 tablet (15 mg total) by mouth daily as needed (arthritis). 30 tablet 2    methocarbamoL (ROBAXIN) 500 MG Tab Take 1 tablet (500 mg total) by mouth 3 (three) times daily as needed (muscle spasms). 40 tablet 0    metoprolol succinate (TOPROL-XL) 50 MG 24 hr tablet Take 1 tablet by mouth once daily 90 tablet 0    mupirocin (BACTROBAN) 2 % ointment Apply topically 2 (two) times daily.      tiZANidine (ZANAFLEX) 4 MG tablet Take 1 tablet by mouth twice daily as needed 20 tablet 0       Review of patient's allergies indicates:   Allergen Reactions    Ampicillin     Penicillins      Other reaction(s): Hives    Sulfa (sulfonamide antibiotics) Rash and Hives       Review of Systems   Constitutional:  Positive for fever. Negative for chills.   HENT:  Negative for congestion and sore throat.    Eyes:  Negative for blurred vision and double vision.   Respiratory:  Positive for hemoptysis and shortness of breath. Negative for cough.    Cardiovascular:  Negative for chest pain and palpitations.   Gastrointestinal:         See HPI   Genitourinary:  Negative for frequency and urgency.   Musculoskeletal:  Negative for joint pain and myalgias.   Skin:  Negative for itching and rash.   Neurological:  Negative for sensory change and focal weakness.      Objective:     Vitals:    10/19/22 0904   BP: (!) 144/67   Pulse:    Resp: (!) 23   Temp:          Constitutional:  not in acute distress and well developed  HENT: Head: Normal, normocephalic, atraumatic.  Eyes: conjunctiva clear and sclera nonicteric  Cardiovascular: regular rate and  rhythm and no murmur  Respiratory: normal chest expansion & respiratory effort   and no accessory muscle use  GI: soft, non-tender, without masses or organomegaly  Musculoskeletal: no muscular tenderness noted  Skin: normal color  Neurological: alert, oriented x3  Psychiatric: mood and affect are within normal limits, pt is a good historian; no memory problems were noted      Significant Labs:  Recent Labs   Lab 10/17/22  1200 10/18/22  0337 10/19/22  0507   HGB 7.7* 7.3* 6.0*       Lab Results   Component Value Date    WBC 21.59 (H) 10/19/2022    HGB 6.0 (L) 10/19/2022    HCT 18.4 (LL) 10/19/2022    MCV 87 10/19/2022     10/19/2022       Lab Results   Component Value Date     (L) 10/19/2022    K 3.5 10/19/2022     10/19/2022    CO2 18 (L) 10/19/2022    BUN 28 (H) 10/19/2022    CREATININE 2.0 (H) 10/19/2022    CALCIUM 7.6 (L) 10/19/2022    ANIONGAP 10 10/19/2022    ESTGFRAFRICA 51 (A) 04/18/2022    EGFRNONAA 44 (A) 04/18/2022       Lab Results   Component Value Date    ALT 62 (H) 10/19/2022     (H) 10/19/2022    ALKPHOS 61 10/19/2022    BILITOT 0.8 10/19/2022       Lab Results   Component Value Date    INR 1.1 10/17/2022    INR 1.0 10/14/2022    INR 1.0 09/24/2022       Significant Imaging:  Reviewed pertinent radiology findings.       Assessment/Plan:     Kristin Goodman is a 77 y.o. female with history of HTN, hypothyroidism, HFpEF, osteoarthritis here for multifocal pneumonia. GI consulted with concern for GI bleed. No overt GI bleeding. Did have hemoptysis on presentation which has improved since admission. Had melena over a week ago but Bms have been brown since. Do not suspect worsening anemia is due to any active GI bleed. Also has active respiratory infection. No endoscopic evaluation planned. Can start IV PPI BID for now given reported prior melena.    Problem List:  Anemia  Hemoptysis  Multifocal pneumonia    Recommendations:  - Low suspicion for active GI bleed  - Can start IV PPI  BID with reported melena over one week ago  - No plans for endoscopic evaluation at this time  - Trend Hgb q12 hrs. Transfuse for Hgb <7, unless otherwise indicated  - Maintain IV access with 2 large bore Ivs  - Intravascular resuscitation/support with IVFs   - Please correct any coagulopathy with platelets and FFP for goal of platelets >50K and INR <2.0  - Please notify GI team if there is significant change in patient's clinical status      Thank you for involving us in the care of Kristin Goodman. Please call with any additional questions, concerns or changes in the patient's clinical status. We will continue to follow.     Gilson Larry MD  Gastroenterology Fellow PGY IV  [facility]

## 2022-10-20 PROBLEM — E87.1 HYPONATREMIA: Status: ACTIVE | Noted: 2022-10-20

## 2022-10-20 LAB
ALBUMIN SERPL BCP-MCNC: 2.2 G/DL (ref 3.5–5.2)
ALBUMIN SERPL BCP-MCNC: 2.4 G/DL (ref 3.5–5.2)
ALP SERPL-CCNC: 83 U/L (ref 55–135)
ALP SERPL-CCNC: 86 U/L (ref 55–135)
ALT SERPL W/O P-5'-P-CCNC: 62 U/L (ref 10–44)
ALT SERPL W/O P-5'-P-CCNC: 72 U/L (ref 10–44)
ANION GAP SERPL CALC-SCNC: 10 MMOL/L (ref 8–16)
ANION GAP SERPL CALC-SCNC: 16 MMOL/L (ref 8–16)
ANISOCYTOSIS BLD QL SMEAR: SLIGHT
ANISOCYTOSIS BLD QL SMEAR: SLIGHT
AST SERPL-CCNC: 117 U/L (ref 10–40)
AST SERPL-CCNC: 91 U/L (ref 10–40)
BASOPHILS # BLD AUTO: 0.02 K/UL (ref 0–0.2)
BASOPHILS # BLD AUTO: 0.03 K/UL (ref 0–0.2)
BASOPHILS NFR BLD: 0.1 % (ref 0–1.9)
BASOPHILS NFR BLD: 0.1 % (ref 0–1.9)
BILIRUB SERPL-MCNC: 1.5 MG/DL (ref 0.1–1)
BILIRUB SERPL-MCNC: 2.1 MG/DL (ref 0.1–1)
BNP SERPL-MCNC: 335 PG/ML (ref 0–99)
BUN SERPL-MCNC: 37 MG/DL (ref 8–23)
BUN SERPL-MCNC: 40 MG/DL (ref 8–23)
CALCIUM SERPL-MCNC: 8.4 MG/DL (ref 8.7–10.5)
CALCIUM SERPL-MCNC: 8.4 MG/DL (ref 8.7–10.5)
CHLORIDE SERPL-SCNC: 97 MMOL/L (ref 95–110)
CHLORIDE SERPL-SCNC: 98 MMOL/L (ref 95–110)
CO2 SERPL-SCNC: 15 MMOL/L (ref 23–29)
CO2 SERPL-SCNC: 19 MMOL/L (ref 23–29)
CREAT SERPL-MCNC: 2.2 MG/DL (ref 0.5–1.4)
CREAT SERPL-MCNC: 2.4 MG/DL (ref 0.5–1.4)
DIFFERENTIAL METHOD: ABNORMAL
DIFFERENTIAL METHOD: ABNORMAL
DOHLE BOD BLD QL SMEAR: PRESENT
DOHLE BOD BLD QL SMEAR: PRESENT
EOSINOPHIL # BLD AUTO: 0 K/UL (ref 0–0.5)
EOSINOPHIL # BLD AUTO: 0.1 K/UL (ref 0–0.5)
EOSINOPHIL NFR BLD: 0.1 % (ref 0–8)
EOSINOPHIL NFR BLD: 0.2 % (ref 0–8)
ERYTHROCYTE [DISTWIDTH] IN BLOOD BY AUTOMATED COUNT: 13.1 % (ref 11.5–14.5)
ERYTHROCYTE [DISTWIDTH] IN BLOOD BY AUTOMATED COUNT: 13.3 % (ref 11.5–14.5)
EST. GFR  (NO RACE VARIABLE): 20.5 ML/MIN/1.73 M^2
EST. GFR  (NO RACE VARIABLE): 22.8 ML/MIN/1.73 M^2
GIANT PLATELETS BLD QL SMEAR: PRESENT
GIANT PLATELETS BLD QL SMEAR: PRESENT
GLUCOSE SERPL-MCNC: 105 MG/DL (ref 70–110)
GLUCOSE SERPL-MCNC: 89 MG/DL (ref 70–110)
HCT VFR BLD AUTO: 29.6 % (ref 37–48.5)
HCT VFR BLD AUTO: 31.2 % (ref 37–48.5)
HGB BLD-MCNC: 10.3 G/DL (ref 12–16)
HGB BLD-MCNC: 9.7 G/DL (ref 12–16)
HYPOCHROMIA BLD QL SMEAR: ABNORMAL
IMM GRANULOCYTES # BLD AUTO: 0.24 K/UL (ref 0–0.04)
IMM GRANULOCYTES # BLD AUTO: 0.25 K/UL (ref 0–0.04)
IMM GRANULOCYTES NFR BLD AUTO: 1 % (ref 0–0.5)
IMM GRANULOCYTES NFR BLD AUTO: 1.1 % (ref 0–0.5)
LYMPHOCYTES # BLD AUTO: 1.1 K/UL (ref 1–4.8)
LYMPHOCYTES # BLD AUTO: 1.3 K/UL (ref 1–4.8)
LYMPHOCYTES NFR BLD: 4.7 % (ref 18–48)
LYMPHOCYTES NFR BLD: 5.9 % (ref 18–48)
MAGNESIUM SERPL-MCNC: 2 MG/DL (ref 1.6–2.6)
MCH RBC QN AUTO: 27.8 PG (ref 27–31)
MCH RBC QN AUTO: 28.1 PG (ref 27–31)
MCHC RBC AUTO-ENTMCNC: 32.8 G/DL (ref 32–36)
MCHC RBC AUTO-ENTMCNC: 33 G/DL (ref 32–36)
MCV RBC AUTO: 84 FL (ref 82–98)
MCV RBC AUTO: 86 FL (ref 82–98)
MONOCYTES # BLD AUTO: 2 K/UL (ref 0.3–1)
MONOCYTES # BLD AUTO: 2.1 K/UL (ref 0.3–1)
MONOCYTES NFR BLD: 8.8 % (ref 4–15)
MONOCYTES NFR BLD: 9.6 % (ref 4–15)
NEUTROPHILS # BLD AUTO: 18.1 K/UL (ref 1.8–7.7)
NEUTROPHILS # BLD AUTO: 19.6 K/UL (ref 1.8–7.7)
NEUTROPHILS NFR BLD: 83.1 % (ref 38–73)
NEUTROPHILS NFR BLD: 85.3 % (ref 38–73)
NRBC BLD-RTO: 0 /100 WBC
NRBC BLD-RTO: 1 /100 WBC
OVALOCYTES BLD QL SMEAR: ABNORMAL
OVALOCYTES BLD QL SMEAR: ABNORMAL
PLATELET # BLD AUTO: 338 K/UL (ref 150–450)
PLATELET # BLD AUTO: 369 K/UL (ref 150–450)
PLATELET BLD QL SMEAR: ABNORMAL
PLATELET BLD QL SMEAR: ABNORMAL
PMV BLD AUTO: 11.4 FL (ref 9.2–12.9)
PMV BLD AUTO: 11.5 FL (ref 9.2–12.9)
POIKILOCYTOSIS BLD QL SMEAR: SLIGHT
POIKILOCYTOSIS BLD QL SMEAR: SLIGHT
POLYCHROMASIA BLD QL SMEAR: ABNORMAL
POTASSIUM SERPL-SCNC: 3.6 MMOL/L (ref 3.5–5.1)
POTASSIUM SERPL-SCNC: 3.9 MMOL/L (ref 3.5–5.1)
PROT SERPL-MCNC: 7 G/DL (ref 6–8.4)
PROT SERPL-MCNC: 7.5 G/DL (ref 6–8.4)
RBC # BLD AUTO: 3.45 M/UL (ref 4–5.4)
RBC # BLD AUTO: 3.7 M/UL (ref 4–5.4)
SODIUM SERPL-SCNC: 126 MMOL/L (ref 136–145)
SODIUM SERPL-SCNC: 129 MMOL/L (ref 136–145)
SPHEROCYTES BLD QL SMEAR: ABNORMAL
SPHEROCYTES BLD QL SMEAR: ABNORMAL
TARGETS BLD QL SMEAR: ABNORMAL
TARGETS BLD QL SMEAR: ABNORMAL
TOXIC GRANULES BLD QL SMEAR: PRESENT
TOXIC GRANULES BLD QL SMEAR: PRESENT
VANCOMYCIN SERPL-MCNC: 15.8 UG/ML
WBC # BLD AUTO: 21.79 K/UL (ref 3.9–12.7)
WBC # BLD AUTO: 22.98 K/UL (ref 3.9–12.7)

## 2022-10-20 PROCEDURE — 27000221 HC OXYGEN, UP TO 24 HOURS

## 2022-10-20 PROCEDURE — 63600175 PHARM REV CODE 636 W HCPCS: Performed by: HOSPITALIST

## 2022-10-20 PROCEDURE — 63600175 PHARM REV CODE 636 W HCPCS

## 2022-10-20 PROCEDURE — 36415 COLL VENOUS BLD VENIPUNCTURE: CPT

## 2022-10-20 PROCEDURE — 83880 ASSAY OF NATRIURETIC PEPTIDE: CPT | Performed by: NURSE PRACTITIONER

## 2022-10-20 PROCEDURE — 94761 N-INVAS EAR/PLS OXIMETRY MLT: CPT

## 2022-10-20 PROCEDURE — C9113 INJ PANTOPRAZOLE SODIUM, VIA: HCPCS | Performed by: NURSE PRACTITIONER

## 2022-10-20 PROCEDURE — 85025 COMPLETE CBC W/AUTO DIFF WBC: CPT | Performed by: HOSPITALIST

## 2022-10-20 PROCEDURE — 63600175 PHARM REV CODE 636 W HCPCS: Performed by: NURSE PRACTITIONER

## 2022-10-20 PROCEDURE — 25000003 PHARM REV CODE 250: Performed by: HOSPITALIST

## 2022-10-20 PROCEDURE — 20600001 HC STEP DOWN PRIVATE ROOM

## 2022-10-20 PROCEDURE — 94640 AIRWAY INHALATION TREATMENT: CPT

## 2022-10-20 PROCEDURE — 80053 COMPREHEN METABOLIC PANEL: CPT

## 2022-10-20 PROCEDURE — 99232 SBSQ HOSP IP/OBS MODERATE 35: CPT | Mod: ,,, | Performed by: HOSPITALIST

## 2022-10-20 PROCEDURE — 99900035 HC TECH TIME PER 15 MIN (STAT)

## 2022-10-20 PROCEDURE — 25000003 PHARM REV CODE 250

## 2022-10-20 PROCEDURE — 25000242 PHARM REV CODE 250 ALT 637 W/ HCPCS

## 2022-10-20 PROCEDURE — 80202 ASSAY OF VANCOMYCIN: CPT | Performed by: HOSPITALIST

## 2022-10-20 PROCEDURE — 99232 PR SUBSEQUENT HOSPITAL CARE,LEVL II: ICD-10-PCS | Mod: ,,, | Performed by: HOSPITALIST

## 2022-10-20 PROCEDURE — 36415 COLL VENOUS BLD VENIPUNCTURE: CPT | Performed by: HOSPITALIST

## 2022-10-20 PROCEDURE — 85025 COMPLETE CBC W/AUTO DIFF WBC: CPT | Mod: 91

## 2022-10-20 RX ORDER — FUROSEMIDE 10 MG/ML
20 INJECTION INTRAMUSCULAR; INTRAVENOUS ONCE
Status: COMPLETED | OUTPATIENT
Start: 2022-10-20 | End: 2022-10-20

## 2022-10-20 RX ORDER — SODIUM CHLORIDE 9 MG/ML
INJECTION, SOLUTION INTRAVENOUS CONTINUOUS
Status: DISCONTINUED | OUTPATIENT
Start: 2022-10-20 | End: 2022-10-21

## 2022-10-20 RX ADMIN — CEFEPIME HYDROCHLORIDE 1 G: 1 INJECTION, SOLUTION INTRAVENOUS at 02:10

## 2022-10-20 RX ADMIN — CEFEPIME HYDROCHLORIDE 1 G: 1 INJECTION, SOLUTION INTRAVENOUS at 05:10

## 2022-10-20 RX ADMIN — HYDRALAZINE HYDROCHLORIDE 100 MG: 50 TABLET ORAL at 08:10

## 2022-10-20 RX ADMIN — ALUMINUM HYDROXIDE, MAGNESIUM HYDROXIDE, AND DIMETHICONE 30 ML: 200; 20; 200 SUSPENSION ORAL at 11:10

## 2022-10-20 RX ADMIN — MUPIROCIN: 20 OINTMENT TOPICAL at 08:10

## 2022-10-20 RX ADMIN — GUAIFENESIN AND DEXTROMETHORPHAN HYDROBROMIDE 1 TABLET: 600; 30 TABLET, EXTENDED RELEASE ORAL at 08:10

## 2022-10-20 RX ADMIN — PANTOPRAZOLE SODIUM 40 MG: 40 INJECTION, POWDER, FOR SOLUTION INTRAVENOUS at 08:10

## 2022-10-20 RX ADMIN — SUCRALFATE 1 G: 1 SUSPENSION ORAL at 06:10

## 2022-10-20 RX ADMIN — FUROSEMIDE 20 MG: 10 INJECTION, SOLUTION INTRAMUSCULAR; INTRAVENOUS at 12:10

## 2022-10-20 RX ADMIN — HYDRALAZINE HYDROCHLORIDE 100 MG: 50 TABLET ORAL at 02:10

## 2022-10-20 RX ADMIN — SODIUM CHLORIDE: 0.9 INJECTION, SOLUTION INTRAVENOUS at 07:10

## 2022-10-20 RX ADMIN — METOPROLOL SUCCINATE 50 MG: 50 TABLET, EXTENDED RELEASE ORAL at 08:10

## 2022-10-20 RX ADMIN — AMLODIPINE BESYLATE 10 MG: 10 TABLET ORAL at 08:10

## 2022-10-20 RX ADMIN — VANCOMYCIN HYDROCHLORIDE 500 MG: 500 INJECTION, POWDER, LYOPHILIZED, FOR SOLUTION INTRAVENOUS at 08:10

## 2022-10-20 RX ADMIN — SODIUM CHLORIDE: 0.9 INJECTION, SOLUTION INTRAVENOUS at 03:10

## 2022-10-20 RX ADMIN — SUCRALFATE 1 G: 1 SUSPENSION ORAL at 11:10

## 2022-10-20 RX ADMIN — IPRATROPIUM BROMIDE AND ALBUTEROL SULFATE 3 ML: 2.5; .5 SOLUTION RESPIRATORY (INHALATION) at 10:10

## 2022-10-20 RX ADMIN — ONDANSETRON 8 MG: 8 TABLET, ORALLY DISINTEGRATING ORAL at 04:10

## 2022-10-20 RX ADMIN — ALUMINUM HYDROXIDE, MAGNESIUM HYDROXIDE, AND DIMETHICONE 30 ML: 200; 20; 200 SUSPENSION ORAL at 06:10

## 2022-10-20 RX ADMIN — SUCRALFATE 1 G: 1 SUSPENSION ORAL at 12:10

## 2022-10-20 RX ADMIN — LEVOTHYROXINE SODIUM 25 MCG: 25 TABLET ORAL at 05:10

## 2022-10-20 RX ADMIN — ALUMINUM HYDROXIDE, MAGNESIUM HYDROXIDE, AND DIMETHICONE 30 ML: 200; 20; 200 SUSPENSION ORAL at 04:10

## 2022-10-20 NOTE — CARE UPDATE
RAPID RESPONSE NURSE ROUND       Rounding completed with 14W Charge RN, [place] for sepsis.  No additional concerns verbalized at this time.   Instructed to call [phone] for further concerns or assistance.
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
## 2022-10-20 NOTE — CARE UPDATE
RAPID RESPONSE NURSE ROUND       Rounding completed with charge RNTiffanie for low H&H reports pt asymptomatic, 2nd unit of PRBCs infusing. No additional concerns verbalized at this time. Instructed to call [phone] for further concerns or assistance.

## 2022-10-20 NOTE — PLAN OF CARE
Patient not medically ready for DC. Medical work up for Sepsis continues and needs are TBD. Therapy to eval  when medically stable. CM to follow for DC planning needs

## 2022-10-20 NOTE — NURSING
Pt receiving 2nd unit of blood when shift started. Vitals remained stable throughout night, but pt coughed up bloody stool. MD notified, which CXR, labs, and sputum cultures placed. After CXR results came back 20 mg lasix was ordered and given. Pt having aprox 800 ml urine output throughout night along with 2 loose stools. IV Protonix ordered and given. Pt was able to sleep throughout night despite having to get many interventions done.

## 2022-10-20 NOTE — PLAN OF CARE
Problem: Adult Inpatient Plan of Care  Goal: Plan of Care Review  Outcome: Ongoing, Progressing  Goal: Patient-Specific Goal (Individualized)  Outcome: Ongoing, Progressing  Goal: Absence of Hospital-Acquired Illness or Injury  Outcome: Ongoing, Progressing  Goal: Optimal Comfort and Wellbeing  Outcome: Ongoing, Progressing  Goal: Readiness for Transition of Care  Outcome: Ongoing, Progressing     Problem: Infection  Goal: Absence of Infection Signs and Symptoms  Outcome: Ongoing, Progressing     Problem: Adjustment to Illness (Sepsis/Septic Shock)  Goal: Optimal Coping  Outcome: Ongoing, Progressing     Problem: Glycemic Control Impaired (Sepsis/Septic Shock)  Goal: Blood Glucose Level Within Desired Range  Outcome: Ongoing, Progressing     Problem: Infection Progression (Sepsis/Septic Shock)  Goal: Absence of Infection Signs and Symptoms  Outcome: Ongoing, Progressing     Problem: Nutrition Impaired (Sepsis/Septic Shock)  Goal: Optimal Nutrition Intake  Outcome: Ongoing, Progressing     Problem: Fluid and Electrolyte Imbalance (Acute Kidney Injury/Impairment)  Goal: Fluid and Electrolyte Balance  Outcome: Ongoing, Progressing     Problem: Oral Intake Inadequate (Acute Kidney Injury/Impairment)  Goal: Optimal Nutrition Intake  Outcome: Ongoing, Progressing     Problem: Renal Function Impairment (Acute Kidney Injury/Impairment)  Goal: Effective Renal Function  Outcome: Ongoing, Progressing     Problem: Fluid Imbalance (Pneumonia)  Goal: Fluid Balance  Outcome: Ongoing, Progressing     Problem: Infection (Pneumonia)  Goal: Resolution of Infection Signs and Symptoms  Outcome: Ongoing, Progressing     Problem: Respiratory Compromise (Pneumonia)  Goal: Effective Oxygenation and Ventilation  Outcome: Ongoing, Progressing     Problem: Fall Injury Risk  Goal: Absence of Fall and Fall-Related Injury  Outcome: Ongoing, Progressing
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
## 2022-10-20 NOTE — PT/OT/SLP PROGRESS
Occupational Therapy      Patient Name:  Kristin Goodman   MRN:  [id number]    Patient not seen today secondary to  (RN hold due to increased O2 requirements, increased RR with exertion & decreased O2 sats with activity.). Will follow-up as appropriate.    10/20/2022

## 2022-10-20 NOTE — PROGRESS NOTES
Pharmacokinetic Assessment Follow Up: IV Vancomycin    Vancomycin serum concentration assessment(s):    The random level was drawn correctly and can be used to guide therapy at this time. The measurement is within the desired definitive target range of 15 to 20 mcg/mL.    Vancomycin Regimen Plan:    Patients serum creatinine continues to rise  Will plan to repeat the 500 mg IV vancomycin dose this morning  Plan to follow up with another VR with 10/21 AM labs  Goal 15-20    Drug levels (last 3 results):  Recent Labs   Lab Result Units 10/18/22  1246 10/19/22  0507 10/20/22  0451   Vancomycin, Random ug/mL 12.2 14.0 15.8     Pharmacy will continue to follow and monitor vancomycin.    Please contact pharmacy at extension [phone] for questions regarding this assessment.    Thank you for the consult,   Lexx Tapia, PharmD, [facility]      Patient brief summary:  Kristin Goodman is a 77 y.o. female initiated on antimicrobial therapy with IV Vancomycin for treatment of lower respiratory infection    Drug Allergies:   Review of patient's allergies indicates:   Allergen Reactions    Ampicillin     Penicillins      Other reaction(s): Hives    Sulfa (sulfonamide antibiotics) Rash and Hives       Actual Body Weight:   63.5 kg    Renal Function:   Estimated Creatinine Clearance: 17.3 mL/min (A) (based on SCr of 2.4 mg/dL (H)).,     Dialysis Method (if applicable):  N/A    CBC (last 72 hours):  Recent Labs   Lab Result Units 10/17/22  1200 10/18/22  0337 10/19/22  0507 10/20/22  0001   WBC K/uL 16.03* 19.62* 21.59* 22.98*   Hemoglobin g/dL 7.7* 7.3* 6.0* 9.7*   Hematocrit % 24.6* 22.4* 18.4* 29.6*   Platelets K/uL 397 406 353 338   Gran % % 68.3 76.9* 81.1* 85.3*   Lymph % % 18.7 10.2* 6.7* 4.7*   Mono % % 11.9 11.7 10.7 8.8   Eosinophil % % 0.4 0.1 0.0 0.1   Basophil % % 0.3 0.2 0.0 0.1   Differential Method  Automated Automated Automated Automated       Metabolic Panel (last 72 hours):  Recent Labs   Lab Result Units 10/17/22  1200  10/17/22  1437 10/18/22  0337 10/19/22  0507 10/19/22  2146 10/20/22  0451   Sodium mmol/L 135*  --  135* 128* 129* 126*   Potassium mmol/L 3.3*  --  3.6 3.5 3.9 3.6   Chloride mmol/L 100  --  104 100 98 97   CO2 mmol/L 24  --  20* 18* 15* 19*   Glucose mg/dL 92  --  132* 95 89 105   Glucose, UA   --  Negative  --   --   --   --    BUN mg/dL 25*  --  21 28* 37* 40*   Creatinine mg/dL 1.6*  --  1.6* 2.0* 2.2* 2.4*   Albumin g/dL 3.0*  --  2.7* 2.1* 2.4* 2.2*   Total Bilirubin mg/dL 0.9  --  1.0 0.8 2.1* 1.5*   Alkaline Phosphatase U/L 59  --  61 61 86 83   AST U/L 56*  --  100* 100* 117* 91*   ALT U/L 29  --  51* 62* 72* 62*   Magnesium mg/dL 1.9  --   --   --  2.0  --        Vancomycin Administrations:  vancomycin given in the last 96 hours                     vancomycin 500 mg in dextrose 5 % 100 mL IVPB (ready to mix system) (mg) 500 mg New Bag 10/19/22 0945    vancomycin 500 mg in dextrose 5 % 100 mL IVPB (ready to mix system) (mg) 500 mg New Bag 10/18/22 1603    vancomycin 1.25 g in dextrose 5% 250 mL IVPB (ready to mix) (mg) 1,250 mg New Bag 10/17/22 1300                    Microbiologic Results:  Microbiology Results (last 7 days)       Procedure Component Value Units Date/Time    Culture, Respiratory with Gram Stain [[id number]] Collected: 10/19/22 2139    Order Status: Completed Specimen: Respiratory from Sputum Updated: 10/20/22 0223     Gram Stain (Respiratory) <10 epithelial cells per low power field.     Gram Stain (Respiratory) Rare WBC's     Gram Stain (Respiratory) Moderate Gram positive cocci     Gram Stain (Respiratory) Few Gram negative rods    Blood culture #1 **CANNOT BE ORDERED STAT** [[id number]] Collected: 10/17/22 1201    Order Status: Completed Specimen: Blood from Peripheral, Forearm, Left Updated: 10/19/22 1412     Blood Culture, Routine No Growth to date      No Growth to date      No Growth to date    Blood culture #2 **CANNOT BE ORDERED STAT** [[id number]] Collected: 10/17/22 1201    Order  Status: Completed Specimen: Blood from Peripheral, Antecubital, Left Updated: 10/19/22 1412     Blood Culture, Routine No Growth to date      No Growth to date      No Growth to date    Urine culture [[id number]] Collected: 10/17/22 1437    Order Status: Completed Specimen: Urine Updated: 10/18/22 2214     Urine Culture, Routine No growth    Narrative:      Specimen Source->Urine    Influenza A & B by Molecular [[id number]] Collected: 10/17/22 2308    Order Status: Completed Specimen: Nasal Swab Updated: 10/18/22 0010     Influenza A, Molecular Negative     Influenza B, Molecular Negative     Flu A & B Source Nasal swab    Influenza A & B by Molecular [[id number]] Collected: 10/17/22 1023    Order Status: Canceled Specimen: Nasopharyngeal Swab

## 2022-10-20 NOTE — PT/OT/SLP PROGRESS
Physical Therapy      Patient Name:  Kristin Goodman   MRN:  [id number]    PT to bedside for PT evaluation. RN hold due to increased O2 requirements, increased RR with exertion & decreased O2 sats with activity. PT to return when appropriate.

## 2022-10-20 NOTE — ASSESSMENT & PLAN NOTE
Starting having melena,HH is dropped,transfused 2 PRBC,HH is improved,started on Protonix gtt and consulted GI.per GI not candidate for EGD at this time,duo to pneumonia and hypoxia,will be monitored.

## 2022-10-21 PROBLEM — R59.1 LYMPHADENOPATHY OF HEAD AND NECK: Status: ACTIVE | Noted: 2022-10-21

## 2022-10-21 PROBLEM — R59.0 CERVICAL ADENOPATHY: Status: ACTIVE | Noted: 2022-10-21

## 2022-10-21 LAB
ANION GAP SERPL CALC-SCNC: 16 MMOL/L (ref 8–16)
BASOPHILS # BLD AUTO: 0.06 K/UL (ref 0–0.2)
BASOPHILS NFR BLD: 0.2 % (ref 0–1.9)
BUN SERPL-MCNC: 50 MG/DL (ref 8–23)
CALCIUM SERPL-MCNC: 8.9 MG/DL (ref 8.7–10.5)
CHLORIDE SERPL-SCNC: 97 MMOL/L (ref 95–110)
CO2 SERPL-SCNC: 16 MMOL/L (ref 23–29)
CREAT SERPL-MCNC: 2.7 MG/DL (ref 0.5–1.4)
DIFFERENTIAL METHOD: ABNORMAL
EOSINOPHIL # BLD AUTO: 0 K/UL (ref 0–0.5)
EOSINOPHIL NFR BLD: 0.1 % (ref 0–8)
ERYTHROCYTE [DISTWIDTH] IN BLOOD BY AUTOMATED COUNT: 13.7 % (ref 11.5–14.5)
EST. GFR  (NO RACE VARIABLE): 17.8 ML/MIN/1.73 M^2
GLUCOSE SERPL-MCNC: 121 MG/DL (ref 70–110)
HCT VFR BLD AUTO: 31.9 % (ref 37–48.5)
HGB BLD-MCNC: 10.7 G/DL (ref 12–16)
HYPOCHROMIA BLD QL SMEAR: ABNORMAL
IMM GRANULOCYTES # BLD AUTO: 0.72 K/UL (ref 0–0.04)
IMM GRANULOCYTES NFR BLD AUTO: 2.8 % (ref 0–0.5)
LYMPHOCYTES # BLD AUTO: 1.3 K/UL (ref 1–4.8)
LYMPHOCYTES NFR BLD: 4.9 % (ref 18–48)
MCH RBC QN AUTO: 27.9 PG (ref 27–31)
MCHC RBC AUTO-ENTMCNC: 33.5 G/DL (ref 32–36)
MCV RBC AUTO: 83 FL (ref 82–98)
MONOCYTES # BLD AUTO: 2.6 K/UL (ref 0.3–1)
MONOCYTES NFR BLD: 10.1 % (ref 4–15)
NEUTROPHILS # BLD AUTO: 21.3 K/UL (ref 1.8–7.7)
NEUTROPHILS NFR BLD: 81.9 % (ref 38–73)
NRBC BLD-RTO: 0 /100 WBC
PLATELET # BLD AUTO: 483 K/UL (ref 150–450)
PLATELET BLD QL SMEAR: ABNORMAL
PMV BLD AUTO: 10.9 FL (ref 9.2–12.9)
POTASSIUM SERPL-SCNC: 3.2 MMOL/L (ref 3.5–5.1)
RBC # BLD AUTO: 3.83 M/UL (ref 4–5.4)
SODIUM SERPL-SCNC: 129 MMOL/L (ref 136–145)
VANCOMYCIN SERPL-MCNC: 18.9 UG/ML
WBC # BLD AUTO: 25.94 K/UL (ref 3.9–12.7)

## 2022-10-21 PROCEDURE — 99232 SBSQ HOSP IP/OBS MODERATE 35: CPT | Mod: ,,, | Performed by: HOSPITALIST

## 2022-10-21 PROCEDURE — C9113 INJ PANTOPRAZOLE SODIUM, VIA: HCPCS | Performed by: NURSE PRACTITIONER

## 2022-10-21 PROCEDURE — 27000221 HC OXYGEN, UP TO 24 HOURS

## 2022-10-21 PROCEDURE — 99900035 HC TECH TIME PER 15 MIN (STAT)

## 2022-10-21 PROCEDURE — 99232 PR SUBSEQUENT HOSPITAL CARE,LEVL II: ICD-10-PCS | Mod: ,,, | Performed by: HOSPITALIST

## 2022-10-21 PROCEDURE — 25000003 PHARM REV CODE 250

## 2022-10-21 PROCEDURE — 20600001 HC STEP DOWN PRIVATE ROOM

## 2022-10-21 PROCEDURE — 63600175 PHARM REV CODE 636 W HCPCS

## 2022-10-21 PROCEDURE — 80048 BASIC METABOLIC PNL TOTAL CA: CPT | Performed by: HOSPITALIST

## 2022-10-21 PROCEDURE — 25000242 PHARM REV CODE 250 ALT 637 W/ HCPCS: Performed by: STUDENT IN AN ORGANIZED HEALTH CARE EDUCATION/TRAINING PROGRAM

## 2022-10-21 PROCEDURE — 63600175 PHARM REV CODE 636 W HCPCS: Performed by: HOSPITALIST

## 2022-10-21 PROCEDURE — 97535 SELF CARE MNGMENT TRAINING: CPT

## 2022-10-21 PROCEDURE — 97165 OT EVAL LOW COMPLEX 30 MIN: CPT

## 2022-10-21 PROCEDURE — 94761 N-INVAS EAR/PLS OXIMETRY MLT: CPT

## 2022-10-21 PROCEDURE — 80202 ASSAY OF VANCOMYCIN: CPT | Performed by: HOSPITALIST

## 2022-10-21 PROCEDURE — 63600175 PHARM REV CODE 636 W HCPCS: Performed by: NURSE PRACTITIONER

## 2022-10-21 PROCEDURE — 25000003 PHARM REV CODE 250: Performed by: HOSPITALIST

## 2022-10-21 PROCEDURE — 36415 COLL VENOUS BLD VENIPUNCTURE: CPT | Performed by: HOSPITALIST

## 2022-10-21 PROCEDURE — 85025 COMPLETE CBC W/AUTO DIFF WBC: CPT | Performed by: HOSPITALIST

## 2022-10-21 PROCEDURE — 94640 AIRWAY INHALATION TREATMENT: CPT

## 2022-10-21 RX ORDER — SODIUM CHLORIDE 9 MG/ML
INJECTION, SOLUTION INTRAVENOUS CONTINUOUS
Status: DISCONTINUED | OUTPATIENT
Start: 2022-10-21 | End: 2022-10-22

## 2022-10-21 RX ORDER — IPRATROPIUM BROMIDE AND ALBUTEROL SULFATE 2.5; .5 MG/3ML; MG/3ML
3 SOLUTION RESPIRATORY (INHALATION) ONCE
Status: COMPLETED | OUTPATIENT
Start: 2022-10-21 | End: 2022-10-21

## 2022-10-21 RX ADMIN — AMLODIPINE BESYLATE 10 MG: 10 TABLET ORAL at 08:10

## 2022-10-21 RX ADMIN — CLONIDINE HYDROCHLORIDE 0.2 MG: 0.1 TABLET ORAL at 10:10

## 2022-10-21 RX ADMIN — CEFEPIME HYDROCHLORIDE 1 G: 1 INJECTION, SOLUTION INTRAVENOUS at 01:10

## 2022-10-21 RX ADMIN — HYDRALAZINE HYDROCHLORIDE 100 MG: 50 TABLET ORAL at 03:10

## 2022-10-21 RX ADMIN — ALUMINUM HYDROXIDE, MAGNESIUM HYDROXIDE, AND DIMETHICONE 30 ML: 200; 20; 200 SUSPENSION ORAL at 12:10

## 2022-10-21 RX ADMIN — SUCRALFATE 1 G: 1 SUSPENSION ORAL at 12:10

## 2022-10-21 RX ADMIN — HYDRALAZINE HYDROCHLORIDE 100 MG: 50 TABLET ORAL at 08:10

## 2022-10-21 RX ADMIN — ALUMINUM HYDROXIDE, MAGNESIUM HYDROXIDE, AND DIMETHICONE 30 ML: 200; 20; 200 SUSPENSION ORAL at 08:10

## 2022-10-21 RX ADMIN — IPRATROPIUM BROMIDE AND ALBUTEROL SULFATE 3 ML: 2.5; .5 SOLUTION RESPIRATORY (INHALATION) at 01:10

## 2022-10-21 RX ADMIN — SUCRALFATE 1 G: 1 SUSPENSION ORAL at 06:10

## 2022-10-21 RX ADMIN — SODIUM CHLORIDE: 0.9 INJECTION, SOLUTION INTRAVENOUS at 12:10

## 2022-10-21 RX ADMIN — MUPIROCIN: 20 OINTMENT TOPICAL at 08:10

## 2022-10-21 RX ADMIN — GUAIFENESIN AND DEXTROMETHORPHAN HYDROBROMIDE 1 TABLET: 600; 30 TABLET, EXTENDED RELEASE ORAL at 08:10

## 2022-10-21 RX ADMIN — ALUMINUM HYDROXIDE, MAGNESIUM HYDROXIDE, AND DIMETHICONE 30 ML: 200; 20; 200 SUSPENSION ORAL at 04:10

## 2022-10-21 RX ADMIN — PANTOPRAZOLE SODIUM 40 MG: 40 INJECTION, POWDER, FOR SOLUTION INTRAVENOUS at 08:10

## 2022-10-21 RX ADMIN — LEVOTHYROXINE SODIUM 25 MCG: 25 TABLET ORAL at 05:10

## 2022-10-21 RX ADMIN — VANCOMYCIN HYDROCHLORIDE 500 MG: 500 INJECTION, POWDER, LYOPHILIZED, FOR SOLUTION INTRAVENOUS at 11:10

## 2022-10-21 RX ADMIN — SUCRALFATE 1 G: 1 SUSPENSION ORAL at 05:10

## 2022-10-21 RX ADMIN — CEFEPIME HYDROCHLORIDE 1 G: 1 INJECTION, SOLUTION INTRAVENOUS at 03:10

## 2022-10-21 RX ADMIN — METOPROLOL SUCCINATE 50 MG: 50 TABLET, EXTENDED RELEASE ORAL at 08:10

## 2022-10-21 NOTE — HPI
Kristin Goodman is a 77 y.o. female who is admitted with a vasculitis causing profound weakness and a prolonged hospital course c/b pneumonia, renal failure, and melena. Her sister was at bedside providing much of the history. Per the sister, she first started having dysphagia about 2 days ago. The patient reports odynophagia under her right jaw when she swallows. Denies any coughing with swallowing or voice changes. She had an MBSS with speech therapy that showed global delayed initiation of swallow and aspiration with thin liquids x1. She currently has an NG tube as she is NPO per speech. Of note, the sister reports that the patient had a history of cleft palate but is unsure of any history of repair.
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
## 2022-10-21 NOTE — TREATMENT PLAN
GI Treatment Plan    Kristin Goodman is a 77 y.o. female admitted to hospital 10/17/2022 (Hospital Day: 5) due to Sepsis due to pneumonia.     Interval History  Hgb has remained stable since transfusion  Still no overt GI bleeding    Objective  Temp:  [98 °F (36.7 °C)-98.7 °F (37.1 °C)] 98.2 °F (36.8 °C) (10/21 0824)  Pulse:  [76-97] 82 (10/21 1044)  BP: (135-193)/(65-84) 171/76 (10/21 1023)  Resp:  [20-31] 25 (10/21 0824)  SpO2:  [92 %-100 %] 93 % (10/21 0930)    Laboratory    Recent Labs   Lab 10/20/22  0001 10/20/22  0451 10/21/22  0845   HGB 9.7* 10.3* 10.7*         Assessment and Plan    Kristin Goodman is a 77 y.o. female with history of HTN, hypothyroidism, HFpEF, osteoarthritis here for multifocal pneumonia. GI consulted with concern for GI bleed. No overt GI bleeding. Did have hemoptysis on presentation which has improved since admission. Had melena over a week ago but Bms have been brown since. Do not suspect worsening anemia is due to any active GI bleed. Also has active respiratory infection. No endoscopic evaluation planned. Hgb stable since transfusion, still with no overt GI bleeding.     Problem List:  Anemia  Hemoptysis  Multifocal pneumonia     Recommendations:  - Low suspicion for active GI bleed  - Can transition to PO PPI daily  - No plans for endoscopic evaluation at this time  - Will schedule for GI clinic follow-up  - GI will sign off at this time    Thank you for involving us in the care of Kristin Goodman. Please call with any additional questions, concerns or changes in the patient's clinical status.    Gilson Larry MD  Gastroenterology Fellow, PGY IV

## 2022-10-21 NOTE — PROGRESS NOTES
Pharmacokinetic Assessment Follow Up: IV Vancomycin    Vancomycin serum concentration assessment(s):    The random level was drawn correctly and can be used to guide therapy at this time. The measurement is within the desired definitive target range of 15 to 20 mcg/mL.    Vancomycin Regimen Plan:    Repeat Vancomycin 500 mg IV one time this morning with next serum trough concentration measured at 10/22 with next day morning labs    Drug levels (last 3 results):  Recent Labs   Lab Result Units 10/19/22  0507 10/20/22  0451 10/21/22  0329   Vancomycin, Random ug/mL 14.0 15.8 18.9       Pharmacy will continue to follow and monitor vancomycin.    Please contact pharmacy at extension [phone] for questions regarding this assessment.    Thank you for the consult,   Indigo Cheema       Patient brief summary:  Kristin Goodman is a 77 y.o. female initiated on antimicrobial therapy with IV Vancomycin for treatment of lower respiratory infection    Drug Allergies:   Review of patient's allergies indicates:   Allergen Reactions    Ampicillin     Penicillins      Other reaction(s): Hives    Sulfa (sulfonamide antibiotics) Rash and Hives       Actual Body Weight:   63.5 kg    Renal Function:   Estimated Creatinine Clearance: 17.3 mL/min (A) (based on SCr of 2.4 mg/dL (H)).,     Dialysis Method (if applicable):  N/A    CBC (last 72 hours):  Recent Labs   Lab Result Units 10/19/22  0507 10/20/22  0001 10/20/22  0451   WBC K/uL 21.59* 22.98* 21.79*   Hemoglobin g/dL 6.0* 9.7* 10.3*   Hematocrit % 18.4* 29.6* 31.2*   Platelets K/uL 353 338 369   Gran % % 81.1* 85.3* 83.1*   Lymph % % 6.7* 4.7* 5.9*   Mono % % 10.7 8.8 9.6   Eosinophil % % 0.0 0.1 0.2   Basophil % % 0.0 0.1 0.1   Differential Method  Automated Automated Automated       Metabolic Panel (last 72 hours):  Recent Labs   Lab Result Units 10/19/22  0507 10/19/22  2146 10/20/22  0451   Sodium mmol/L 128* 129* 126*   Potassium mmol/L 3.5 3.9 3.6   Chloride mmol/L 100 98 97   CO2  mmol/L 18* 15* 19*   Glucose mg/dL 95 89 105   BUN mg/dL 28* 37* 40*   Creatinine mg/dL 2.0* 2.2* 2.4*   Albumin g/dL 2.1* 2.4* 2.2*   Total Bilirubin mg/dL 0.8 2.1* 1.5*   Alkaline Phosphatase U/L 61 86 83   AST U/L 100* 117* 91*   ALT U/L 62* 72* 62*   Magnesium mg/dL  --  2.0  --        Vancomycin Administrations:  vancomycin given in the last 96 hours                     vancomycin 500 mg in dextrose 5 % 100 mL IVPB (ready to mix system) (mg) 500 mg New Bag 10/20/22 0849    vancomycin 500 mg in dextrose 5 % 100 mL IVPB (ready to mix system) (mg) 500 mg New Bag 10/19/22 0945    vancomycin 500 mg in dextrose 5 % 100 mL IVPB (ready to mix system) (mg) 500 mg New Bag 10/18/22 1603    vancomycin 1.25 g in dextrose 5% 250 mL IVPB (ready to mix) (mg) 1,250 mg New Bag 10/17/22 1300                    Microbiologic Results:  Microbiology Results (last 7 days)       Procedure Component Value Units Date/Time    Blood culture #2 **CANNOT BE ORDERED STAT** [[id number]] Collected: 10/17/22 1201    Order Status: Completed Specimen: Blood from Peripheral, Antecubital, Left Updated: 10/20/22 1412     Blood Culture, Routine No Growth to date      No Growth to date      No Growth to date      No Growth to date    Blood culture #1 **CANNOT BE ORDERED STAT** [[id number]] Collected: 10/17/22 1201    Order Status: Completed Specimen: Blood from Peripheral, Forearm, Left Updated: 10/20/22 1412     Blood Culture, Routine No Growth to date      No Growth to date      No Growth to date      No Growth to date    Culture, Respiratory with Gram Stain [[id number]] Collected: 10/19/22 2139    Order Status: Completed Specimen: Respiratory from Sputum Updated: 10/20/22 0223     Gram Stain (Respiratory) <10 epithelial cells per low power field.     Gram Stain (Respiratory) Rare WBC's     Gram Stain (Respiratory) Moderate Gram positive cocci     Gram Stain (Respiratory) Few Gram negative rods    Urine culture [[id number]] Collected: 10/17/22 1437     Order Status: Completed Specimen: Urine Updated: 10/18/22 2214     Urine Culture, Routine No growth    Narrative:      Specimen Source->Urine    Influenza A & B by Molecular [[id number]] Collected: 10/17/22 2308    Order Status: Completed Specimen: Nasal Swab Updated: 10/18/22 0010     Influenza A, Molecular Negative     Influenza B, Molecular Negative     Flu A & B Source Nasal swab    Influenza A & B by Molecular [[id number]] Collected: 10/17/22 1023    Order Status: Canceled Specimen: Nasopharyngeal Swab
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
## 2022-10-21 NOTE — PLAN OF CARE
Problem: Occupational Therapy  Goal: Occupational Therapy Goal  Description: Goals to be met by: 11/15     Patient will increase functional independence with ADLs by performing:    UE Dressing with Modified [place].  LE Dressing with Modified [place].  Grooming while standing with Modified [place].  Toileting from toilet with Modified [place] for hygiene and clothing management.   Supine to sit with Modified [place].  Step transfer with Modified [place]  Toilet transfer to toilet with Modified [place].    Outcome: Ongoing, Progressing     OT eval completed.
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
## 2022-10-21 NOTE — PLAN OF CARE
Problem: Adult Inpatient Plan of Care  Goal: Plan of Care Review  Outcome: Ongoing, Progressing  Goal: Patient-Specific Goal (Individualized)  Outcome: Ongoing, Progressing  Goal: Absence of Hospital-Acquired Illness or Injury  Outcome: Ongoing, Progressing  Goal: Optimal Comfort and Wellbeing  Outcome: Ongoing, Progressing  Goal: Readiness for Transition of Care  Outcome: Ongoing, Progressing     Problem: Infection  Goal: Absence of Infection Signs and Symptoms  Outcome: Ongoing, Progressing     Problem: Adjustment to Illness (Sepsis/Septic Shock)  Goal: Optimal Coping  Outcome: Ongoing, Progressing     Problem: Bleeding (Sepsis/Septic Shock)  Goal: Absence of Bleeding  Outcome: Ongoing, Progressing     Problem: Glycemic Control Impaired (Sepsis/Septic Shock)  Goal: Blood Glucose Level Within Desired Range  Outcome: Ongoing, Progressing     Problem: Infection Progression (Sepsis/Septic Shock)  Goal: Absence of Infection Signs and Symptoms  Outcome: Ongoing, Progressing     Problem: Nutrition Impaired (Sepsis/Septic Shock)  Goal: Optimal Nutrition Intake  Outcome: Ongoing, Progressing     Problem: Fluid and Electrolyte Imbalance (Acute Kidney Injury/Impairment)  Goal: Fluid and Electrolyte Balance  Outcome: Ongoing, Progressing     Problem: Oral Intake Inadequate (Acute Kidney Injury/Impairment)  Goal: Optimal Nutrition Intake  Outcome: Ongoing, Progressing     Problem: Renal Function Impairment (Acute Kidney Injury/Impairment)  Goal: Effective Renal Function  Outcome: Ongoing, Progressing     Problem: Fluid Imbalance (Pneumonia)  Goal: Fluid Balance  Outcome: Ongoing, Progressing     Problem: Infection (Pneumonia)  Goal: Resolution of Infection Signs and Symptoms  Outcome: Ongoing, Progressing     Problem: Respiratory Compromise (Pneumonia)  Goal: Effective Oxygenation and Ventilation  Outcome: Ongoing, Progressing     Problem: Fall Injury Risk  Goal: Absence of Fall and Fall-Related Injury  Outcome: Ongoing,  Progressing     Problem: Skin Injury Risk Increased  Goal: Skin Health and Integrity  Outcome: Ongoing, Progressing

## 2022-10-21 NOTE — PT/OT/SLP PROGRESS
Physical Therapy      Patient Name:  Kristin Goodman   MRN:  [id number]    PT to bedside for PT evaluation. Pt completed OT eval with decreased SpO2 noted and additional time required for recovery per OT. Pt and family agreeable for PT eval at next attempt in morning. PT to return when appropriate.

## 2022-10-21 NOTE — NURSING
"Messaged  Dr. Moore via secure chat. pt stated that she was having a hard time breathing, 02 is 100% on 4L. n/c . respiratory did give her a prn treatment. pt stated that the large mask over her face during breathing tx's helped her breath better. she states," that it is just harder for me to breath tonight I am not sure what is going on". hr 89, resp 24, bp 185/79. Also advise am Labs her sodium was low  and I don't see where it was replaced.    Dr. Moore placed orders in for pt to have a repeat breathing tx and cxr.      Per  Dr. Moore messaged "We don't replace sodium It has to come up on its own".     No new orders given for low Sodium of 126. Updated plan of care with pt.     0140  Breathing tx did help pt feel better. Bed low, call light in reach. Pending cxr.   "

## 2022-10-21 NOTE — ASSESSMENT & PLAN NOTE
78 yo F admitted for pneumonia consulted to ENT for a single episode of transient L sided otalgia yesterday evening and tender bilateral cervical adenopathy since this morning.     - No surgical intervention indicated   - Adenopathy likely reactive in nature   - Recommend further workup if it does not resolve within next 2 weeks   - Continue abx per primary  - Please page ENT with any questions or concerns

## 2022-10-21 NOTE — ASSESSMENT & PLAN NOTE
She has has ARF on CKD 3,will monitor while is on IVF and consult nephrology in AM if not improved in AM.

## 2022-10-21 NOTE — CARE UPDATE
"RAPID RESPONSE NURSE AI ALERT       AI alert received.    Chart Reviewed: 10/21/2022, 11:08 AM    MRN: [id number]  Bed: 53670/94597 A    Dx: Sepsis due to pneumonia    Kristin Goodman has a past medical history of Acute blood loss anemia, Allergy, Back pain, Chronic diastolic heart failure, Chronic diastolic heart failure, Colon polyp, Disorder of kidney and ureter, H/O Bell's palsy, Helicobacter pylori (H. pylori), HTN (hypertension), Hypothyroid, OA (osteoarthritis), DONAVAN (obstructive sleep apnea), Pneumonia due to other staphylococcus, Pulmonary HTN, Sepsis due to pneumonia, Trouble in sleeping, and Urinary incontinence.    Last VS: BP (!) 171/76   Pulse 82   Temp 98.2 °F (36.8 °C) (Oral)   Resp (!) 25   Ht 5' 1" (1.549 m)   Wt 63.5 kg (139 lb 15.9 oz)   SpO2 (!) 93%   BMI 26.45 kg/m²     24H Vital Sign Range:  Temp:  [98 °F (36.7 °C)-98.7 °F (37.1 °C)]   Pulse:  [76-97]   Resp:  [20-31]   BP: (135-193)/(65-84)   SpO2:  [92 %-100 %]     Level of Consciousness (AVPU): alert    Recent Labs     10/20/22  0001 10/20/22  0451 10/21/22  0845   WBC 22.98* 21.79* 25.94*   HGB 9.7* 10.3* 10.7*   HCT 29.6* 31.2* 31.9*    369 483*       Recent Labs     10/19/22  2146 10/20/22  0451 10/21/22  0845   * 126* 129*   K 3.9 3.6 3.2*   CL 98 97 97   CO2 15* 19* 16*   CREATININE 2.2* 2.4* 2.7*   GLU 89 105 121*   MG 2.0  --   --         No results for input(s): PH, PCO2, PO2, HCO3, POCSATURATED, BE in the last 72 hours.     OXYGEN:  Flow (L/min): 4     O2 Device (Oxygen Therapy): nasal cannula    MEWS score: 3    Charge RN, [place]  contacted. No concerns verbalized at this time. Instructed to call [phone] for further concerns or assistance.    Yasmin Avitia RN        "
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
## 2022-10-22 LAB
ALBUMIN SERPL BCP-MCNC: 1.9 G/DL (ref 3.5–5.2)
ALP SERPL-CCNC: 105 U/L (ref 55–135)
ALT SERPL W/O P-5'-P-CCNC: 81 U/L (ref 10–44)
ANION GAP SERPL CALC-SCNC: 14 MMOL/L (ref 8–16)
AST SERPL-CCNC: 158 U/L (ref 10–40)
BACTERIA BLD CULT: NORMAL
BACTERIA BLD CULT: NORMAL
BACTERIA SPEC AEROBE CULT: NORMAL
BACTERIA SPEC AEROBE CULT: NORMAL
BASOPHILS # BLD AUTO: 0.1 K/UL (ref 0–0.2)
BASOPHILS NFR BLD: 0.4 % (ref 0–1.9)
BILIRUB SERPL-MCNC: 1.4 MG/DL (ref 0.1–1)
BUN SERPL-MCNC: 53 MG/DL (ref 8–23)
CALCIUM SERPL-MCNC: 8.6 MG/DL (ref 8.7–10.5)
CHLORIDE SERPL-SCNC: 101 MMOL/L (ref 95–110)
CO2 SERPL-SCNC: 14 MMOL/L (ref 23–29)
CREAT SERPL-MCNC: 2.8 MG/DL (ref 0.5–1.4)
DIFFERENTIAL METHOD: ABNORMAL
EOSINOPHIL # BLD AUTO: 0 K/UL (ref 0–0.5)
EOSINOPHIL NFR BLD: 0.1 % (ref 0–8)
ERYTHROCYTE [DISTWIDTH] IN BLOOD BY AUTOMATED COUNT: 13.7 % (ref 11.5–14.5)
EST. GFR  (NO RACE VARIABLE): 17.1 ML/MIN/1.73 M^2
GLUCOSE SERPL-MCNC: 117 MG/DL (ref 70–110)
GRAM STN SPEC: NORMAL
HCT VFR BLD AUTO: 28.3 % (ref 37–48.5)
HGB BLD-MCNC: 9.6 G/DL (ref 12–16)
IMM GRANULOCYTES # BLD AUTO: 0.87 K/UL (ref 0–0.04)
IMM GRANULOCYTES NFR BLD AUTO: 3.1 % (ref 0–0.5)
LYMPHOCYTES # BLD AUTO: 1.3 K/UL (ref 1–4.8)
LYMPHOCYTES NFR BLD: 4.7 % (ref 18–48)
MCH RBC QN AUTO: 28.1 PG (ref 27–31)
MCHC RBC AUTO-ENTMCNC: 33.9 G/DL (ref 32–36)
MCV RBC AUTO: 83 FL (ref 82–98)
MONOCYTES # BLD AUTO: 3.1 K/UL (ref 0.3–1)
MONOCYTES NFR BLD: 10.8 % (ref 4–15)
NEUTROPHILS # BLD AUTO: 23 K/UL (ref 1.8–7.7)
NEUTROPHILS NFR BLD: 80.9 % (ref 38–73)
NRBC BLD-RTO: 0 /100 WBC
PLATELET # BLD AUTO: 457 K/UL (ref 150–450)
PMV BLD AUTO: 10.3 FL (ref 9.2–12.9)
POCT GLUCOSE: 126 MG/DL (ref 70–110)
POTASSIUM SERPL-SCNC: 3.6 MMOL/L (ref 3.5–5.1)
PROT SERPL-MCNC: 7 G/DL (ref 6–8.4)
RBC # BLD AUTO: 3.42 M/UL (ref 4–5.4)
SODIUM SERPL-SCNC: 129 MMOL/L (ref 136–145)
VANCOMYCIN SERPL-MCNC: 20.3 UG/ML
WBC # BLD AUTO: 28.37 K/UL (ref 3.9–12.7)

## 2022-10-22 PROCEDURE — 99233 SBSQ HOSP IP/OBS HIGH 50: CPT | Mod: ,,, | Performed by: HOSPITALIST

## 2022-10-22 PROCEDURE — C9113 INJ PANTOPRAZOLE SODIUM, VIA: HCPCS | Performed by: NURSE PRACTITIONER

## 2022-10-22 PROCEDURE — 80202 ASSAY OF VANCOMYCIN: CPT | Performed by: HOSPITALIST

## 2022-10-22 PROCEDURE — 97162 PT EVAL MOD COMPLEX 30 MIN: CPT

## 2022-10-22 PROCEDURE — 97530 THERAPEUTIC ACTIVITIES: CPT

## 2022-10-22 PROCEDURE — 99233 PR SUBSEQUENT HOSPITAL CARE,LEVL III: ICD-10-PCS | Mod: ,,, | Performed by: HOSPITALIST

## 2022-10-22 PROCEDURE — 25000003 PHARM REV CODE 250

## 2022-10-22 PROCEDURE — 63600175 PHARM REV CODE 636 W HCPCS

## 2022-10-22 PROCEDURE — 63600175 PHARM REV CODE 636 W HCPCS: Performed by: NURSE PRACTITIONER

## 2022-10-22 PROCEDURE — 25000003 PHARM REV CODE 250: Performed by: HOSPITALIST

## 2022-10-22 PROCEDURE — 80053 COMPREHEN METABOLIC PANEL: CPT | Performed by: HOSPITALIST

## 2022-10-22 PROCEDURE — 85025 COMPLETE CBC W/AUTO DIFF WBC: CPT | Performed by: HOSPITALIST

## 2022-10-22 PROCEDURE — 20600001 HC STEP DOWN PRIVATE ROOM

## 2022-10-22 PROCEDURE — 36415 COLL VENOUS BLD VENIPUNCTURE: CPT | Performed by: HOSPITALIST

## 2022-10-22 RX ORDER — METOPROLOL SUCCINATE 50 MG/1
50 TABLET, EXTENDED RELEASE ORAL ONCE
Status: COMPLETED | OUTPATIENT
Start: 2022-10-22 | End: 2022-10-22

## 2022-10-22 RX ORDER — METOPROLOL SUCCINATE 50 MG/1
100 TABLET, EXTENDED RELEASE ORAL DAILY
Status: DISCONTINUED | OUTPATIENT
Start: 2022-10-23 | End: 2022-10-26

## 2022-10-22 RX ORDER — MORPHINE SULFATE ORAL SOLUTION 10 MG/5ML
2.5 SOLUTION ORAL ONCE
Status: DISCONTINUED | OUTPATIENT
Start: 2022-10-22 | End: 2022-10-23

## 2022-10-22 RX ORDER — HYDRALAZINE HYDROCHLORIDE 25 MG/1
25 TABLET, FILM COATED ORAL EVERY 8 HOURS PRN
Status: DISCONTINUED | OUTPATIENT
Start: 2022-10-22 | End: 2022-10-26

## 2022-10-22 RX ORDER — CEFEPIME HYDROCHLORIDE 1 G/50ML
1 INJECTION, SOLUTION INTRAVENOUS
Status: DISCONTINUED | OUTPATIENT
Start: 2022-10-23 | End: 2022-10-23

## 2022-10-22 RX ADMIN — MUPIROCIN: 20 OINTMENT TOPICAL at 08:10

## 2022-10-22 RX ADMIN — CEFEPIME HYDROCHLORIDE 1 G: 1 INJECTION, SOLUTION INTRAVENOUS at 02:10

## 2022-10-22 RX ADMIN — LEVOTHYROXINE SODIUM 25 MCG: 25 TABLET ORAL at 05:10

## 2022-10-22 RX ADMIN — ALUMINUM HYDROXIDE, MAGNESIUM HYDROXIDE, AND DIMETHICONE 30 ML: 200; 20; 200 SUSPENSION ORAL at 08:10

## 2022-10-22 RX ADMIN — HYDRALAZINE HYDROCHLORIDE 25 MG: 25 TABLET, FILM COATED ORAL at 11:10

## 2022-10-22 RX ADMIN — CLONIDINE HYDROCHLORIDE 0.2 MG: 0.1 TABLET ORAL at 12:10

## 2022-10-22 RX ADMIN — HYDRALAZINE HYDROCHLORIDE 100 MG: 50 TABLET ORAL at 08:10

## 2022-10-22 RX ADMIN — GUAIFENESIN AND DEXTROMETHORPHAN HYDROBROMIDE 1 TABLET: 600; 30 TABLET, EXTENDED RELEASE ORAL at 08:10

## 2022-10-22 RX ADMIN — SODIUM CHLORIDE: 0.9 INJECTION, SOLUTION INTRAVENOUS at 03:10

## 2022-10-22 RX ADMIN — PANTOPRAZOLE SODIUM 40 MG: 40 INJECTION, POWDER, FOR SOLUTION INTRAVENOUS at 08:10

## 2022-10-22 RX ADMIN — SIMETHICONE 80 MG: 80 TABLET, CHEWABLE ORAL at 11:10

## 2022-10-22 RX ADMIN — HYDRALAZINE HYDROCHLORIDE 100 MG: 50 TABLET ORAL at 03:10

## 2022-10-22 RX ADMIN — AMLODIPINE BESYLATE 10 MG: 10 TABLET ORAL at 08:10

## 2022-10-22 RX ADMIN — SUCRALFATE 1 G: 1 SUSPENSION ORAL at 11:10

## 2022-10-22 RX ADMIN — ALUMINUM HYDROXIDE, MAGNESIUM HYDROXIDE, AND DIMETHICONE 30 ML: 200; 20; 200 SUSPENSION ORAL at 11:10

## 2022-10-22 RX ADMIN — METOPROLOL SUCCINATE 50 MG: 50 TABLET, EXTENDED RELEASE ORAL at 08:10

## 2022-10-22 NOTE — PROGRESS NOTES
Pt alert and oriented x 4, no c/o pain. Assisted pt to bedside commode. Pt has considerable dyspnea w/ exertion. Received 2 calls from rapid response d/t increased rr. Pt takes a long time to recover just from pivoting to commode. Educated pt, and told her she may need to consider using purewick. Remains on 3L. Bladder scanned twice, volume less than 200, then less than 48 post void. No other issues. Continue to monitor.

## 2022-10-22 NOTE — CARE UPDATE
"RAPID RESPONSE NURSE AI ALERT       AI alert received.    Chart Reviewed: 10/22/2022, 4:49 AM    MRN: [id number]  Bed: 40966/30575 A    Dx: Sepsis due to pneumonia    Kristin Goodman has a past medical history of Acute blood loss anemia, Allergy, Back pain, Chronic diastolic heart failure, Chronic diastolic heart failure, Colon polyp, Disorder of kidney and ureter, H/O Bell's palsy, Helicobacter pylori (H. pylori), HTN (hypertension), Hypothyroid, OA (osteoarthritis), DONAVAN (obstructive sleep apnea), Pneumonia due to other staphylococcus, Pulmonary HTN, Sepsis due to pneumonia, Trouble in sleeping, and Urinary incontinence.    Last VS: BP (!) 144/67   Pulse 90   Temp 99.2 °F (37.3 °C) (Oral)   Resp (!) 22   Ht 5' 1" (1.549 m)   Wt 63.5 kg (139 lb 15.9 oz)   SpO2 100%   BMI 26.45 kg/m²     24H Vital Sign Range:  Temp:  [97.6 °F (36.4 °C)-99.2 °F (37.3 °C)]   Pulse:  []   Resp:  [20-31]   BP: (139-193)/(64-84)   SpO2:  [90 %-100 %]     Level of Consciousness (AVPU): alert    Recent Labs     10/20/22  0451 10/21/22  0845 10/22/22  0053   WBC 21.79* 25.94* 28.37*   HGB 10.3* 10.7* 9.6*   HCT 31.2* 31.9* 28.3*    483* 457*       Recent Labs     10/19/22  2146 10/20/22  0451 10/21/22  0845 10/22/22  0053   * 126* 129* 129*   K 3.9 3.6 3.2* 3.6   CL 98 97 97 101   CO2 15* 19* 16* 14*   CREATININE 2.2* 2.4* 2.7* 2.8*   GLU 89 105 121* 117*   MG 2.0  --   --   --         No results for input(s): PH, PCO2, PO2, HCO3, POCSATURATED, BE in the last 72 hours.     OXYGEN:  Flow (L/min): 3     O2 Device (Oxygen Therapy): nasal cannula    MEWS score: 2    Bedside RN, [place]  contacted. No concerns verbalized at this time. Instructed to call [phone] for further concerns or assistance.    Alessia Matos RN       "

## 2022-10-22 NOTE — PLAN OF CARE
PT Evaluation completed and PT POC established.    Problem: Physical Therapy  Goal: Physical Therapy Goal  Description: Goals to be met by: 2022     Patient will increase functional independence with mobility by performin. Supine to sit with [place]  2. Sit to supine with [place]  3. Sit to stand transfer with Supervision  4. Bed to chair transfer with Supervision using LRAD  5. Gait  x 50 feet with Supervision using LRAD.     Outcome: Ongoing, Progressing

## 2022-10-22 NOTE — PT/OT/SLP EVAL
"Physical Therapy Evaluation    Patient Name:  Kristin Goodman   MRN:  [id number]    Recommendations:     Discharge Recommendations:  home health PT   Discharge Equipment Recommendations:  (TBD)   Barriers to discharge: Pt currently requiring increased level of assistance with mobility    Assessment:     Kristin Goodman is a 77 y.o. female admitted with a medical diagnosis of Sepsis due to pneumonia.  She presents with the following impairments/functional limitations:  weakness, impaired endurance, impaired functional mobility, gait instability, impaired balance, decreased lower extremity function, impaired cardiopulmonary response to activity. AAOx4, pleasant, follow simple commands. Pt SpO2 remained >90% throughout session with pacing and breathing technique. Pt performed supine > sit with slow progression of HOB to 80 degrees CGA, transfers HHA Humberto, and amb 3 lateral steps + 4 steps HHA Humberto for steadiness. Pt will benefit from skilled PT services while in house in order to address the aforementioned deficits.      Rehab Prognosis: Good; patient would benefit from acute skilled PT services to address these deficits and reach maximum level of function.    Recent Surgery: * No surgery found *      Plan:     During this hospitalization, patient to be seen 3 x/week to address the identified rehab impairments via gait training, therapeutic activities, therapeutic exercises, neuromuscular re-education and progress toward the following goals:    Plan of Care Expires:  11/21/22    Subjective     Chief Complaint: "I'm comfortable here"  Patient/Family Comments/goals: improve mobility  Pain/Comfort:  Pain Rating 1: 0/10  Pain Rating Post-Intervention 1: 0/10    Patients cultural, spiritual, Zoroastrianism conflicts given the current situation: no    Living Environment:  Per EMR, Pt resides with daughter in 1 story house with ~10-12 steps (B) rails to enter.  Pt was independent with ADL's & ambulation.  Pt has a bathtub for bathing. "  Pt uses bus for transportation.  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    Equipment Used at Home:   (owns however does not use: RW & SPC)    Assistance upon Discharge: unknown  Objective:     Communicated with RN prior to session.  Patient found HOB elevated with telemetry, pulse ox (continuous), oxygen, blood pressure cuff  upon PT entry to room.    General Precautions: Standard, fall   Orthopedic Precautions:N/A   Braces: N/A  Respiratory Status: Nasal cannula, flow 3 L/min    Exams:  Cognitive Exam:  Patient is oriented to AAOx4  RLE ROM: WFL  RLE Strength: WFL  LLE ROM: WFL  LLE Strength: WFL    Functional Mobility:  Bed Mobility:     Scooting: contact guard assistance  Supine to Sit: contact guard assistance  Transfers:     Sit to Stand:  minimum assistance with hand-held assist  Bed to Chair: minimum assistance with  hand-held assist  using  Step Transfer  Gait: Pt amb 3 lateral steps + 4 steps HHA Humberto with seated break in between bouts. Pt presented with B shortened step, head down, decreased obey, mild unsteadiness, and CAREY.   Balance:   Good sitting balance  Fair standing balance      AM-PAC 6 CLICK MOBILITY  Total Score:18       Treatment & Education:  Discussed pacing activities and breathing techniques     Educated pt on PT role/POC  Educated pt on importance of OOB activity and daily ambulation   Pt educated on proper body mechanics, safety techniques, and energy conservation with PT facilitation and cueing throughout session   Pt verbalized understanding      Patient left up in chair with all lines intact, call button in reach, RN notified, and daughter present.    GOALS:   Multidisciplinary Problems       Physical Therapy Goals          Problem: Physical Therapy    Goal Priority Disciplines Outcome Goal Variances Interventions   Physical Therapy Goal     PT, PT/OT Ongoing, Progressing     Description: Goals to be met by: 11/02/2022     Patient will  increase functional independence with mobility by performin. Supine to sit with [place]  2. Sit to supine with [place]  3. Sit to stand transfer with Supervision  4. Bed to chair transfer with Supervision using LRAD  5. Gait  x 50 feet with Supervision using LRAD.                          History:     Past Medical History:   Diagnosis Date    Acute blood loss anemia 10/17/2022    Allergy     Back pain     Chronic diastolic heart failure 2020    Chronic diastolic heart failure 2020    Colon polyp     Disorder of kidney and ureter     H/O Bell's palsy     after [place]    Helicobacter pylori (H. pylori)     HTN (hypertension)     Hypothyroid     OA (osteoarthritis)     DONAVAN (obstructive sleep apnea) 2020    Pneumonia due to other staphylococcus     Pulmonary HTN 2020    Sepsis due to pneumonia 10/17/2022    Trouble in sleeping     Urinary incontinence        Past Surgical History:   Procedure Laterality Date    ARTHROSCOPIC CHONDROPLASTY OF KNEE JOINT Right 2021    Procedure: ARTHROSCOPY, KNEE, WITH CHONDROPLASTY;  Surgeon: Elly Sullivan MD;  Location: [facility] OR;  Service: Orthopedics;  Laterality: Right;    COLONOSCOPY N/A 2020    Procedure: COLONOSCOPY;  Surgeon: Jaylan Flynn MD;  Location: [facility];  Service: Endoscopy;  Laterality: N/A;    KNEE ARTHROSCOPY W/ MENISCECTOMY Right 2021    Procedure: ARTHROSCOPY, KNEE, WITH MENISCECTOMY;  Surgeon: Elly Sullivan MD;  Location: [facility] OR;  Service: Orthopedics;  Laterality: Right;  general, regional w catheter, adductor, josefina 50cc,     OOPHORECTOMY      SYNOVECTOMY OF KNEE Right 2021    Procedure: SYNOVECTOMY, KNEE;  Surgeon: Elly Sullivan MD;  Location: [facility] OR;  Service: Orthopedics;  Laterality: Right;    TOTAL ABDOMINAL HYSTERECTOMY      19 yrs ago       Time Tracking:     PT Received On: 10/22/22  PT Start Time: 807     PT Stop Time: 833  PT Total Time (min): 26 min     Billable  Minutes: Evaluation 10 and Therapeutic Activity 16      10/22/2022

## 2022-10-22 NOTE — CODE/ RAPID DOCUMENTATION
"RAPID RESPONSE NURSE AI ALERT       AI alert received.    Chart Reviewed: 10/21/2022, 8:54 PM    MRN: [id number]  Bed: 25428/18674 A    Dx: Sepsis due to pneumonia    Kristin Goodman has a past medical history of Acute blood loss anemia, Allergy, Back pain, Chronic diastolic heart failure, Chronic diastolic heart failure, Colon polyp, Disorder of kidney and ureter, H/O Bell's palsy, Helicobacter pylori (H. pylori), HTN (hypertension), Hypothyroid, OA (osteoarthritis), DONAVAN (obstructive sleep apnea), Pneumonia due to other staphylococcus, Pulmonary HTN, Sepsis due to pneumonia, Trouble in sleeping, and Urinary incontinence.    Last VS: BP (!) 188/81   Pulse 102   Temp 98.8 °F (37.1 °C)   Resp (!) 26   Ht 5' 1" (1.549 m)   Wt 63.5 kg (139 lb 15.9 oz)   SpO2 96%   BMI 26.45 kg/m²     24H Vital Sign Range:  Temp:  [97.6 °F (36.4 °C)-98.8 °F (37.1 °C)]   Pulse:  []   Resp:  [20-31]   BP: (139-193)/(64-84)   SpO2:  [90 %-100 %]     Level of Consciousness (AVPU): alert    Recent Labs     10/20/22  0001 10/20/22  0451 10/21/22  0845   WBC 22.98* 21.79* 25.94*   HGB 9.7* 10.3* 10.7*   HCT 29.6* 31.2* 31.9*    369 483*       Recent Labs     10/19/22  2146 10/20/22  0451 10/21/22  0845   * 126* 129*   K 3.9 3.6 3.2*   CL 98 97 97   CO2 15* 19* 16*   CREATININE 2.2* 2.4* 2.7*   GLU 89 105 121*   MG 2.0  --   --         No results for input(s): PH, PCO2, PO2, HCO3, POCSATURATED, BE in the last 72 hours.     OXYGEN:  Flow (L/min): 3     O2 Device (Oxygen Therapy): nasal cannula    MEWS score: 1    Bedside RNChristel  contacted. Pt in no distress, VSS, no concerns verbalized at this time. Instructed to call [phone] for further concerns or assistance.    Emil Enrique RN          "

## 2022-10-22 NOTE — PROGRESS NOTES
Pharmacist Renal Dose Adjustment Note    Kristin Goodman is a 77 y.o. female being treated with the medication cefepime    Patient Data:    Vital Signs (Most Recent):  Temp: 98.7 °F (37.1 °C) (10/22/22 1215)  Pulse: 89 (10/22/22 1221)  Resp: 20 (10/22/22 1215)  BP: (!) 168/70 (10/22/22 1215)  SpO2: (!) 92 % (10/22/22 1221)   Vital Signs (72h Range):  Temp:  [97.6 °F (36.4 °C)-100 °F (37.8 °C)]   Pulse:  []   Resp:  [17-34]   BP: (130-193)/(62-84)   SpO2:  [90 %-100 %]      Recent Labs   Lab 10/20/22  0451 10/21/22  0845 10/22/22  0053   CREATININE 2.4* 2.7* 2.8*     Serum creatinine: 2.8 mg/dL (H) 10/22/22 0053  Estimated creatinine clearance: 14.8 mL/min (A)    Cefepime 1 g every 12 hours will be changed to cefepime 1 g every 24 hours    Pharmacist's Name: Mariah Massey  Pharmacist's Extension: [phone]

## 2022-10-22 NOTE — PROGRESS NOTES
Pharmacokinetic Assessment Follow Up: IV Vancomycin    Vancomycin serum concentration assessment(s):    - Random level ~17 hours post-dose resulted at 20.3 mcg/mL  - Goal trough concentration is 15-20 mcg/mL  - Patient is currently in [facility], serum creatinine trending up 2.4>2.7>2.8 mg/dL (baseline ~1.0-1.3 mg/dL)    Vancomycin Regimen Plan:    - Will hold vancomycin today due to WILFREDO  - Check random level on 10/23 with AM labs    Drug levels (last 3 results):  Recent Labs   Lab Result Units 10/20/22  0451 10/21/22  0329 10/22/22  0447   Vancomycin, Random ug/mL 15.8 18.9 20.3       Pharmacy will continue to follow and monitor vancomycin.    Please contact pharmacy at extension [phone] for questions regarding this assessment.    Thank you for the consult,   Mariah Massey       Patient brief summary:  Kristin Goodman is a 77 y.o. female initiated on antimicrobial therapy with IV Vancomycin for treatment of lower respiratory infection    Drug Allergies:   Review of patient's allergies indicates:   Allergen Reactions    Ampicillin     Penicillins      Other reaction(s): Hives    Sulfa (sulfonamide antibiotics) Rash and Hives       Actual Body Weight:   63.5 kg    Renal Function:   Estimated Creatinine Clearance: 14.8 mL/min (A) (based on SCr of 2.8 mg/dL (H)).     CBC (last 72 hours):  Recent Labs   Lab Result Units 10/20/22  0001 10/20/22  0451 10/21/22  0845 10/22/22  0053   WBC K/uL 22.98* 21.79* 25.94* 28.37*   Hemoglobin g/dL 9.7* 10.3* 10.7* 9.6*   Hematocrit % 29.6* 31.2* 31.9* 28.3*   Platelets K/uL 338 369 483* 457*   Gran % % 85.3* 83.1* 81.9* 80.9*   Lymph % % 4.7* 5.9* 4.9* 4.7*   Mono % % 8.8 9.6 10.1 10.8   Eosinophil % % 0.1 0.2 0.1 0.1   Basophil % % 0.1 0.1 0.2 0.4   Differential Method  Automated Automated Automated Automated       Metabolic Panel (last 72 hours):  Recent Labs   Lab Result Units 10/19/22  2146 10/20/22  0451 10/21/22  0845 10/22/22  0053   Sodium mmol/L 129* 126* 129* 129*   Potassium mmol/L  3.9 3.6 3.2* 3.6   Chloride mmol/L 98 97 97 101   CO2 mmol/L 15* 19* 16* 14*   Glucose mg/dL 89 105 121* 117*   BUN mg/dL 37* 40* 50* 53*   Creatinine mg/dL 2.2* 2.4* 2.7* 2.8*   Albumin g/dL 2.4* 2.2*  --  1.9*   Total Bilirubin mg/dL 2.1* 1.5*  --  1.4*   Alkaline Phosphatase U/L 86 83  --  105   AST U/L 117* 91*  --  158*   ALT U/L 72* 62*  --  81*   Magnesium mg/dL 2.0  --   --   --        Vancomycin Administrations:  vancomycin given in the last 96 hours                     vancomycin 500 mg in dextrose 5 % 100 mL IVPB (ready to mix system) (mg) 500 mg New Bag 10/21/22 1128    vancomycin 500 mg in dextrose 5 % 100 mL IVPB (ready to mix system) (mg) 500 mg New Bag 10/20/22 0849    vancomycin 500 mg in dextrose 5 % 100 mL IVPB (ready to mix system) (mg) 500 mg New Bag 10/19/22 0945    vancomycin 500 mg in dextrose 5 % 100 mL IVPB (ready to mix system) (mg) 500 mg New Bag 10/18/22 1603                    Microbiologic Results:  Microbiology Results (last 7 days)       Procedure Component Value Units Date/Time    Culture, Respiratory with Gram Stain [[id number]] Collected: 10/19/22 2139    Order Status: Completed Specimen: Respiratory from Sputum Updated: 10/22/22 0900     Respiratory Culture Normal respiratory zheng      No S aureus or Pseudomonas isolated.     Gram Stain (Respiratory) <10 epithelial cells per low power field.     Gram Stain (Respiratory) Rare WBC's     Gram Stain (Respiratory) Moderate Gram positive cocci     Gram Stain (Respiratory) Few Gram negative rods    Blood culture #2 **CANNOT BE ORDERED STAT** [[id number]] Collected: 10/17/22 1201    Order Status: Completed Specimen: Blood from Peripheral, Antecubital, Left Updated: 10/21/22 1412     Blood Culture, Routine No Growth to date      No Growth to date      No Growth to date      No Growth to date      No Growth to date    Blood culture #1 **CANNOT BE ORDERED STAT** [[id number]] Collected: 10/17/22 1201    Order Status: Completed Specimen: Blood  from Peripheral, Forearm, Left Updated: 10/21/22 1412     Blood Culture, Routine No Growth to date      No Growth to date      No Growth to date      No Growth to date      No Growth to date    Urine culture [[id number]] Collected: 10/17/22 1437    Order Status: Completed Specimen: Urine Updated: 10/18/22 2214     Urine Culture, Routine No growth    Narrative:      Specimen Source->Urine    Influenza A & B by Molecular [[id number]] Collected: 10/17/22 2308    Order Status: Completed Specimen: Nasal Swab Updated: 10/18/22 0010     Influenza A, Molecular Negative     Influenza B, Molecular Negative     Flu A & B Source Nasal swab    Influenza A & B by Molecular [[id number]] Collected: 10/17/22 1023    Order Status: Canceled Specimen: Nasopharyngeal Swab

## 2022-10-22 NOTE — CARE UPDATE
"RAPID RESPONSE NURSE CHART REVIEW       Chart Reviewed: 10/22/2022, 5:32 AM    MRN: [id number]  Bed: 61929/69232 A    Dx: Sepsis due to pneumonia    Kristin Goodman has a past medical history of Acute blood loss anemia, Allergy, Back pain, Chronic diastolic heart failure, Chronic diastolic heart failure, Colon polyp, Disorder of kidney and ureter, H/O Bell's palsy, Helicobacter pylori (H. pylori), HTN (hypertension), Hypothyroid, OA (osteoarthritis), ODNAVAN (obstructive sleep apnea), Pneumonia due to other staphylococcus, Pulmonary HTN, Sepsis due to pneumonia, Trouble in sleeping, and Urinary incontinence.    Last VS: BP (!) 144/67   Pulse 90   Temp 99.2 °F (37.3 °C) (Oral)   Resp (!) 22   Ht 5' 1" (1.549 m)   Wt 63.5 kg (139 lb 15.9 oz)   SpO2 100%   BMI 26.45 kg/m²     24H Vital Sign Range:  Temp:  [97.6 °F (36.4 °C)-99.2 °F (37.3 °C)]   Pulse:  []   Resp:  [20-27]   BP: (139-193)/(64-84)   SpO2:  [90 %-100 %]     Level of Consciousness (AVPU): alert    Recent Labs     10/20/22  0451 10/21/22  0845 10/22/22  0053   WBC 21.79* 25.94* 28.37*   HGB 10.3* 10.7* 9.6*   HCT 31.2* 31.9* 28.3*    483* 457*       Recent Labs     10/19/22  2146 10/20/22  0451 10/21/22  0845 10/22/22  0053   * 126* 129* 129*   K 3.9 3.6 3.2* 3.6   CL 98 97 97 101   CO2 15* 19* 16* 14*   CREATININE 2.2* 2.4* 2.7* 2.8*   GLU 89 105 121* 117*   MG 2.0  --   --   --         No results for input(s): PH, PCO2, PO2, HCO3, POCSATURATED, BE in the last 72 hours.     OXYGEN:  Flow (L/min): 3     O2 Device (Oxygen Therapy): nasal cannula    MEWS score: 2    Bedside RNChristel  contacted. Christel reported pt felt SOB and light headed after standing to bedside commode. POCT glucose obtained. Reported SpO2 97% on chronic 3L NC. Shortly after pt reported relief of symptoms to nurse. Recommend notifying team of symptomatic exertion and to encourage use of external catheter or bedpan. No concerns verbalized at this time. Instructed to call " [phone] for further concerns or assistance.    Alessia Matos RN

## 2022-10-23 ENCOUNTER — TELEPHONE (OUTPATIENT)
Dept: ENDOSCOPY | Facility: HOSPITAL | Age: 77
End: 2022-10-23
Payer: MEDICARE

## 2022-10-23 PROBLEM — J96.01 ACUTE HYPOXEMIC RESPIRATORY FAILURE: Status: ACTIVE | Noted: 2022-10-23

## 2022-10-23 PROBLEM — D62 ACUTE BLOOD LOSS ANEMIA: Status: ACTIVE | Noted: 2022-10-19

## 2022-10-23 LAB
ALBUMIN SERPL BCP-MCNC: 1.6 G/DL (ref 3.5–5.2)
ALLENS TEST: ABNORMAL
ALLENS TEST: ABNORMAL
ALP SERPL-CCNC: 134 U/L (ref 55–135)
ALT SERPL W/O P-5'-P-CCNC: 166 U/L (ref 10–44)
ANION GAP SERPL CALC-SCNC: 12 MMOL/L (ref 8–16)
ANISOCYTOSIS BLD QL SMEAR: SLIGHT
AST SERPL-CCNC: 362 U/L (ref 10–40)
BASOPHILS NFR BLD: 0 % (ref 0–1.9)
BILIRUB SERPL-MCNC: 2.5 MG/DL (ref 0.1–1)
BUN SERPL-MCNC: 88 MG/DL (ref 8–23)
C3 SERPL-MCNC: 171 MG/DL (ref 50–180)
C4 SERPL-MCNC: 26 MG/DL (ref 11–44)
CALCIUM SERPL-MCNC: 8.6 MG/DL (ref 8.7–10.5)
CHLORIDE SERPL-SCNC: 106 MMOL/L (ref 95–110)
CO2 SERPL-SCNC: 17 MMOL/L (ref 23–29)
CREAT SERPL-MCNC: 3 MG/DL (ref 0.5–1.4)
CREAT SERPL-MCNC: 3.4 MG/DL (ref 0.5–1.4)
DACRYOCYTES BLD QL SMEAR: ABNORMAL
DELSYS: ABNORMAL
DIFFERENTIAL METHOD: ABNORMAL
DOHLE BOD BLD QL SMEAR: PRESENT
EOSINOPHIL NFR BLD: 0 % (ref 0–8)
ERYTHROCYTE [DISTWIDTH] IN BLOOD BY AUTOMATED COUNT: 13.6 % (ref 11.5–14.5)
EST. GFR  (NO RACE VARIABLE): 13.5 ML/MIN/1.73 M^2
EST. GFR  (NO RACE VARIABLE): 15.7 ML/MIN/1.73 M^2
FLOW: 5
GLUCOSE SERPL-MCNC: 136 MG/DL (ref 70–110)
HAV IGM SERPL QL IA: NORMAL
HBV CORE IGM SERPL QL IA: NORMAL
HBV SURFACE AG SERPL QL IA: NORMAL
HCO3 UR-SCNC: 17.5 MMOL/L (ref 24–28)
HCO3 UR-SCNC: 17.8 MMOL/L (ref 24–28)
HCT VFR BLD AUTO: 24.5 % (ref 37–48.5)
HCV AB SERPL QL IA: NORMAL
HGB BLD-MCNC: 8.6 G/DL (ref 12–16)
HYPOCHROMIA BLD QL SMEAR: ABNORMAL
IGA SERPL-MCNC: 220 MG/DL (ref 40–350)
IMM GRANULOCYTES # BLD AUTO: ABNORMAL K/UL (ref 0–0.04)
IMM GRANULOCYTES NFR BLD AUTO: ABNORMAL % (ref 0–0.5)
LACTATE SERPL-SCNC: 0.9 MMOL/L (ref 0.5–2.2)
LYMPHOCYTES NFR BLD: 6 % (ref 18–48)
MCH RBC QN AUTO: 28.3 PG (ref 27–31)
MCHC RBC AUTO-ENTMCNC: 35.1 G/DL (ref 32–36)
MCV RBC AUTO: 81 FL (ref 82–98)
METAMYELOCYTES NFR BLD MANUAL: 2 %
MODE: ABNORMAL
MONOCYTES NFR BLD: 16 % (ref 4–15)
MYELOCYTES NFR BLD MANUAL: 4 %
NEUTROPHILS NFR BLD: 71 % (ref 38–73)
NEUTS BAND NFR BLD MANUAL: 1 %
NRBC BLD-RTO: 0 /100 WBC
OVALOCYTES BLD QL SMEAR: ABNORMAL
PCO2 BLDA: 29.4 MMHG (ref 35–45)
PCO2 BLDA: 29.7 MMHG (ref 35–45)
PH SMN: 7.38 [PH] (ref 7.35–7.45)
PH SMN: 7.39 [PH] (ref 7.35–7.45)
PLATELET # BLD AUTO: 507 K/UL (ref 150–450)
PLATELET BLD QL SMEAR: ABNORMAL
PMV BLD AUTO: 10.6 FL (ref 9.2–12.9)
PO2 BLDA: 101 MMHG (ref 80–100)
PO2 BLDA: 56 MMHG (ref 80–100)
POC BE: -7 MMOL/L
POC BE: -8 MMOL/L
POC SATURATED O2: 89 % (ref 95–100)
POC SATURATED O2: 98 % (ref 95–100)
POC TCO2: 18 MMOL/L (ref 23–27)
POC TCO2: 19 MMOL/L (ref 23–27)
POCT GLUCOSE: 166 MG/DL (ref 70–110)
POIKILOCYTOSIS BLD QL SMEAR: SLIGHT
POLYCHROMASIA BLD QL SMEAR: ABNORMAL
POTASSIUM SERPL-SCNC: 3.3 MMOL/L (ref 3.5–5.1)
PROT SERPL-MCNC: 6.7 G/DL (ref 6–8.4)
RBC # BLD AUTO: 3.04 M/UL (ref 4–5.4)
RHEUMATOID FACT SERPL-ACNC: 14 IU/ML (ref 0–15)
SAMPLE: ABNORMAL
SAMPLE: ABNORMAL
SITE: ABNORMAL
SITE: ABNORMAL
SODIUM SERPL-SCNC: 135 MMOL/L (ref 136–145)
SP02: 93
TARGETS BLD QL SMEAR: ABNORMAL
TOXIC GRANULES BLD QL SMEAR: PRESENT
VANCOMYCIN SERPL-MCNC: 15.2 UG/ML
WBC # BLD AUTO: 28.97 K/UL (ref 3.9–12.7)

## 2022-10-23 PROCEDURE — 99223 PR INITIAL HOSPITAL CARE,LEVL III: ICD-10-PCS | Mod: GC,,, | Performed by: INTERNAL MEDICINE

## 2022-10-23 PROCEDURE — 25000003 PHARM REV CODE 250: Performed by: HOSPITALIST

## 2022-10-23 PROCEDURE — 86160 COMPLEMENT ANTIGEN: CPT | Mod: 59 | Performed by: STUDENT IN AN ORGANIZED HEALTH CARE EDUCATION/TRAINING PROGRAM

## 2022-10-23 PROCEDURE — 93005 ELECTROCARDIOGRAM TRACING: CPT

## 2022-10-23 PROCEDURE — 82803 BLOOD GASES ANY COMBINATION: CPT

## 2022-10-23 PROCEDURE — 86431 RHEUMATOID FACTOR QUANT: CPT | Performed by: STUDENT IN AN ORGANIZED HEALTH CARE EDUCATION/TRAINING PROGRAM

## 2022-10-23 PROCEDURE — 86160 COMPLEMENT ANTIGEN: CPT | Performed by: STUDENT IN AN ORGANIZED HEALTH CARE EDUCATION/TRAINING PROGRAM

## 2022-10-23 PROCEDURE — 99223 PR INITIAL HOSPITAL CARE,LEVL III: ICD-10-PCS | Mod: ,,, | Performed by: STUDENT IN AN ORGANIZED HEALTH CARE EDUCATION/TRAINING PROGRAM

## 2022-10-23 PROCEDURE — 99900031 HC PATIENT EDUCATION (STAT)

## 2022-10-23 PROCEDURE — 93010 EKG 12-LEAD: ICD-10-PCS | Mod: ,,, | Performed by: INTERNAL MEDICINE

## 2022-10-23 PROCEDURE — 83880 ASSAY OF NATRIURETIC PEPTIDE: CPT | Performed by: HOSPITALIST

## 2022-10-23 PROCEDURE — 63600175 PHARM REV CODE 636 W HCPCS: Performed by: HOSPITALIST

## 2022-10-23 PROCEDURE — 84484 ASSAY OF TROPONIN QUANT: CPT | Performed by: HOSPITALIST

## 2022-10-23 PROCEDURE — 99223 1ST HOSP IP/OBS HIGH 75: CPT | Mod: GC,,, | Performed by: INTERNAL MEDICINE

## 2022-10-23 PROCEDURE — 94761 N-INVAS EAR/PLS OXIMETRY MLT: CPT

## 2022-10-23 PROCEDURE — 84300 ASSAY OF URINE SODIUM: CPT | Performed by: NURSE PRACTITIONER

## 2022-10-23 PROCEDURE — 99900035 HC TECH TIME PER 15 MIN (STAT)

## 2022-10-23 PROCEDURE — 80053 COMPREHEN METABOLIC PANEL: CPT | Performed by: HOSPITALIST

## 2022-10-23 PROCEDURE — 83516 IMMUNOASSAY NONANTIBODY: CPT | Performed by: STUDENT IN AN ORGANIZED HEALTH CARE EDUCATION/TRAINING PROGRAM

## 2022-10-23 PROCEDURE — 83520 IMMUNOASSAY QUANT NOS NONAB: CPT | Performed by: INTERNAL MEDICINE

## 2022-10-23 PROCEDURE — 86255 FLUORESCENT ANTIBODY SCREEN: CPT | Performed by: STUDENT IN AN ORGANIZED HEALTH CARE EDUCATION/TRAINING PROGRAM

## 2022-10-23 PROCEDURE — 25000242 PHARM REV CODE 250 ALT 637 W/ HCPCS

## 2022-10-23 PROCEDURE — 63600175 PHARM REV CODE 636 W HCPCS: Performed by: NURSE PRACTITIONER

## 2022-10-23 PROCEDURE — 93010 ELECTROCARDIOGRAM REPORT: CPT | Mod: ,,, | Performed by: INTERNAL MEDICINE

## 2022-10-23 PROCEDURE — 84156 ASSAY OF PROTEIN URINE: CPT | Performed by: STUDENT IN AN ORGANIZED HEALTH CARE EDUCATION/TRAINING PROGRAM

## 2022-10-23 PROCEDURE — 99233 SBSQ HOSP IP/OBS HIGH 50: CPT | Mod: ,,, | Performed by: HOSPITALIST

## 2022-10-23 PROCEDURE — 81001 URINALYSIS AUTO W/SCOPE: CPT | Performed by: NURSE PRACTITIONER

## 2022-10-23 PROCEDURE — 94660 CPAP INITIATION&MGMT: CPT

## 2022-10-23 PROCEDURE — 85347 COAGULATION TIME ACTIVATED: CPT

## 2022-10-23 PROCEDURE — 82784 ASSAY IGA/IGD/IGG/IGM EACH: CPT | Performed by: STUDENT IN AN ORGANIZED HEALTH CARE EDUCATION/TRAINING PROGRAM

## 2022-10-23 PROCEDURE — 80074 ACUTE HEPATITIS PANEL: CPT | Performed by: STUDENT IN AN ORGANIZED HEALTH CARE EDUCATION/TRAINING PROGRAM

## 2022-10-23 PROCEDURE — 25000003 PHARM REV CODE 250

## 2022-10-23 PROCEDURE — 27000221 HC OXYGEN, UP TO 24 HOURS

## 2022-10-23 PROCEDURE — 85027 COMPLETE CBC AUTOMATED: CPT | Performed by: HOSPITALIST

## 2022-10-23 PROCEDURE — 36600 WITHDRAWAL OF ARTERIAL BLOOD: CPT

## 2022-10-23 PROCEDURE — 27000190 HC CPAP FULL FACE MASK W/VALVE

## 2022-10-23 PROCEDURE — 86039 ANTINUCLEAR ANTIBODIES (ANA): CPT | Performed by: STUDENT IN AN ORGANIZED HEALTH CARE EDUCATION/TRAINING PROGRAM

## 2022-10-23 PROCEDURE — 99233 PR SUBSEQUENT HOSPITAL CARE,LEVL III: ICD-10-PCS | Mod: ,,, | Performed by: HOSPITALIST

## 2022-10-23 PROCEDURE — 84540 ASSAY OF URINE/UREA-N: CPT | Performed by: NURSE PRACTITIONER

## 2022-10-23 PROCEDURE — 83935 ASSAY OF URINE OSMOLALITY: CPT | Performed by: NURSE PRACTITIONER

## 2022-10-23 PROCEDURE — 20000000 HC ICU ROOM

## 2022-10-23 PROCEDURE — 83516 IMMUNOASSAY NONANTIBODY: CPT | Mod: 59 | Performed by: STUDENT IN AN ORGANIZED HEALTH CARE EDUCATION/TRAINING PROGRAM

## 2022-10-23 PROCEDURE — 86038 ANTINUCLEAR ANTIBODIES: CPT | Performed by: STUDENT IN AN ORGANIZED HEALTH CARE EDUCATION/TRAINING PROGRAM

## 2022-10-23 PROCEDURE — 82565 ASSAY OF CREATININE: CPT | Performed by: HOSPITALIST

## 2022-10-23 PROCEDURE — C9113 INJ PANTOPRAZOLE SODIUM, VIA: HCPCS | Performed by: NURSE PRACTITIONER

## 2022-10-23 PROCEDURE — 80202 ASSAY OF VANCOMYCIN: CPT | Performed by: HOSPITALIST

## 2022-10-23 PROCEDURE — 85007 BL SMEAR W/DIFF WBC COUNT: CPT | Performed by: HOSPITALIST

## 2022-10-23 PROCEDURE — 36415 COLL VENOUS BLD VENIPUNCTURE: CPT | Performed by: HOSPITALIST

## 2022-10-23 PROCEDURE — 86225 DNA ANTIBODY NATIVE: CPT | Mod: 59 | Performed by: STUDENT IN AN ORGANIZED HEALTH CARE EDUCATION/TRAINING PROGRAM

## 2022-10-23 PROCEDURE — 83605 ASSAY OF LACTIC ACID: CPT | Performed by: HOSPITALIST

## 2022-10-23 PROCEDURE — 86235 NUCLEAR ANTIGEN ANTIBODY: CPT | Mod: 59 | Performed by: STUDENT IN AN ORGANIZED HEALTH CARE EDUCATION/TRAINING PROGRAM

## 2022-10-23 PROCEDURE — 94640 AIRWAY INHALATION TREATMENT: CPT

## 2022-10-23 PROCEDURE — 82595 ASSAY OF CRYOGLOBULIN: CPT | Performed by: STUDENT IN AN ORGANIZED HEALTH CARE EDUCATION/TRAINING PROGRAM

## 2022-10-23 PROCEDURE — 99223 1ST HOSP IP/OBS HIGH 75: CPT | Mod: ,,, | Performed by: STUDENT IN AN ORGANIZED HEALTH CARE EDUCATION/TRAINING PROGRAM

## 2022-10-23 RX ORDER — CEFEPIME HYDROCHLORIDE 1 G/50ML
1 INJECTION, SOLUTION INTRAVENOUS
Status: DISCONTINUED | OUTPATIENT
Start: 2022-10-23 | End: 2022-10-23

## 2022-10-23 RX ORDER — MORPHINE SULFATE 2 MG/ML
2 INJECTION, SOLUTION INTRAMUSCULAR; INTRAVENOUS ONCE
Status: DISCONTINUED | OUTPATIENT
Start: 2022-10-23 | End: 2022-10-23

## 2022-10-23 RX ORDER — FUROSEMIDE 10 MG/ML
120 INJECTION INTRAMUSCULAR; INTRAVENOUS ONCE
Status: COMPLETED | OUTPATIENT
Start: 2022-10-23 | End: 2022-10-23

## 2022-10-23 RX ORDER — CEFEPIME HYDROCHLORIDE 1 G/50ML
1 INJECTION, SOLUTION INTRAVENOUS
Status: DISCONTINUED | OUTPATIENT
Start: 2022-10-23 | End: 2022-10-24

## 2022-10-23 RX ADMIN — ALUMINUM HYDROXIDE, MAGNESIUM HYDROXIDE, AND DIMETHICONE 30 ML: 200; 20; 200 SUSPENSION ORAL at 10:10

## 2022-10-23 RX ADMIN — PANTOPRAZOLE SODIUM 40 MG: 40 INJECTION, POWDER, FOR SOLUTION INTRAVENOUS at 09:10

## 2022-10-23 RX ADMIN — METOPROLOL SUCCINATE 100 MG: 100 TABLET, EXTENDED RELEASE ORAL at 09:10

## 2022-10-23 RX ADMIN — IPRATROPIUM BROMIDE AND ALBUTEROL SULFATE 3 ML: 2.5; .5 SOLUTION RESPIRATORY (INHALATION) at 01:10

## 2022-10-23 RX ADMIN — AMLODIPINE BESYLATE 10 MG: 10 TABLET ORAL at 09:10

## 2022-10-23 RX ADMIN — ONDANSETRON 8 MG: 8 TABLET, ORALLY DISINTEGRATING ORAL at 06:10

## 2022-10-23 RX ADMIN — HYDRALAZINE HYDROCHLORIDE 100 MG: 50 TABLET ORAL at 09:10

## 2022-10-23 RX ADMIN — ACETAMINOPHEN 1000 MG: 500 TABLET ORAL at 10:10

## 2022-10-23 RX ADMIN — CLONIDINE HYDROCHLORIDE 0.2 MG: 0.1 TABLET ORAL at 12:10

## 2022-10-23 RX ADMIN — LEVOTHYROXINE SODIUM 25 MCG: 25 TABLET ORAL at 05:10

## 2022-10-23 RX ADMIN — CEFEPIME HYDROCHLORIDE 1 G: 1 INJECTION, SOLUTION INTRAVENOUS at 05:10

## 2022-10-23 RX ADMIN — FUROSEMIDE 120 MG: 10 INJECTION, SOLUTION INTRAMUSCULAR; INTRAVENOUS at 10:10

## 2022-10-23 RX ADMIN — GUAIFENESIN AND DEXTROMETHORPHAN HYDROBROMIDE 1 TABLET: 600; 30 TABLET, EXTENDED RELEASE ORAL at 09:10

## 2022-10-23 RX ADMIN — IPRATROPIUM BROMIDE AND ALBUTEROL SULFATE 3 ML: 2.5; .5 SOLUTION RESPIRATORY (INHALATION) at 10:10

## 2022-10-23 RX ADMIN — ONDANSETRON 8 MG: 8 TABLET, ORALLY DISINTEGRATING ORAL at 11:10

## 2022-10-23 RX ADMIN — DEXTROSE 500 MG: 50 INJECTION, SOLUTION INTRAVENOUS at 11:10

## 2022-10-23 NOTE — NURSING
1329 Dr. Ocasio notified pt will desat with laying flat for a few minutes while doing pericare and in and out cath per nephrology request o2 sat decreased 86 increased o2 to 4liters o2 sat up to 90% rapid at bedside for evaluation. pt was some what anxious during episode but calm now   1555 Dr. Ocasio notified pt desat easily to 80's with lying supine and flat. takes a couple minutes to rebound to 90-91% o2 sat on 4liters, had 4 loose stools brown sticky. Family (daughter) updated on md suggestion not to lay pt flat if able  when providing pericare and turning, notified pt states doesn't want to swallow hydralazine sbp 140's, pt appears exhausted  1725 Dr. Ocasio notified of pt hasnt eaten today has only taken in a few sips of water

## 2022-10-23 NOTE — PROGRESS NOTES
[facility] ([address] Medicine  Progress Note    Patient Name: Kristin Goodman  MRN: [id number]  Patient Class: IP- Inpatient   Admission Date: 10/17/2022  Length of Stay: 6 days  Attending Physician: Michelle Ocasio MD  Primary Care Provider: Lori Hernandez MD        Subjective:     Principal Problem:Sepsis due to pneumonia        HPI:  Kristin Goodman is a 77 y.o. female with a past medical history of HTN, hypothyroidism, HFpEF, and osteoarthritis of the arm who has presented to the ED for cough, SOB, and weakness. Daughter is present at the bedside. Patient presented to the ED on 9/24 with hemoptysis, CT and CXR showed high suspicion for multilobar pneumonia. Patient was discharged with a 10-day course of levofloxacin and an albuterol inhaler. Patient followed up with her PCP on 10/4 with slight improvement in symptoms and went back to work. Patient continued to have worsening symptoms and presented to the ED again on 10/14 for evaluation and no interventions were done. Patient endorses fevers over the last few days up to 102.4 F with progressive SOB. She endorses hemoptysis with moderate amount of blood, generalized weakness, productive cough, SOB, and loss of appetite. Denies chest pain, nausea, vomiting, abdominal pain, or urinary changes.    ED: hypertensive up to 219/93 and tachycardic up to 117. Oxygen saturation on 92% on RA, placed on 5L NC with sats >97%. CBC remarkable for leukocytosis of 16.03 and Hb 7.7. K 3.3. Cr 1.6, baseline ~1.0. . Troponin 0.061. COVID and flu negative. EKG NSR. CT chest with contrast pending at time of admission. Given home amlodipine and lisinopril. Given 500mL NS, IV azithromycin, cefepime and vanc.       Overview/Hospital Course:  Kristin Goodman is a 77 y.o. female with a past medical history of HTN, hypothyroidism, HFpEF, and osteoarthritis of the arm who has presented to the ED for cough, SOB, and weakness. Daughter is present at the  bedside. Patient presented to the ED on 9/24 with hemoptysis, CT and CXR showed high suspicion for multilobar pneumonia. Patient was discharged with a 10-day course of levofloxacin and an albuterol inhaler. Patient followed up with her PCP on 10/4 with slight improvement in symptoms and went back to work. Patient continued to have worsening symptoms and presented to the ED again on 10/14 for evaluation and no interventions were done. Patient endorses fevers over the last few days up to 102.4 F with progressive SOB. She endorses hemoptysis with moderate amount of blood, generalized weakness, productive cough, SOB, and loss of appetite. Denies chest pain, nausea, vomiting, abdominal pain, or urinary changes.CT chest show extensive pneumonia,patient has been  started on broad spectrum IV Abx with cefepime and vancomycin,cultures are pending,fever is resolved,elevated BP is better controlled.  Starting having melena,HH is dropped,transfused 2 PRBC,HH is improved,started on Protonix gtt and consulted GI.per GI not candidate for EGD at this time,duo to pneumonia and hypoxia,will be monitored.  Hyponatremia minimally impacted by IV hydration so fluids discontinued.    Has bilateral neck gland swelling with tenderness,consulted ENT.  WBCs continue to increase to 28 with intermittent hemoptysis.  Renal function has continued to worsen with an elevated creatinine of 3.0.  Nephrology is consulted for further evaluation and treatment recommendations.  Infectious diseases consult as the patient has had no change in elevation of white blood cell is despite a week of IV antibiotic therapy.  The patient remains afebrile.      Interval History: Patient resting comfortably this a.m.     Review of Systems   Constitutional:  Positive for fatigue. Negative for fever.   Respiratory:  Positive for cough.         Hemoptysis   Gastrointestinal:  Positive for abdominal pain and nausea.   Objective:     Vital Signs (Most Recent):  Temp: 98 °F  (36.7 °C) (10/23/22 1348)  Pulse: 67 (10/23/22 1348)  Resp: 20 (10/23/22 1348)  BP: 131/60 (10/23/22 1348)  SpO2: 96 % (10/23/22 1348)   Vital Signs (24h Range):  Temp:  [98 °F (36.7 °C)-98.7 °F (37.1 °C)] 98 °F (36.7 °C)  Pulse:  [67-99] 67  Resp:  [18-36] 20  SpO2:  [93 %-98 %] 96 %  BP: (131-201)/(60-86) 131/60     Weight: 63.5 kg (139 lb 15.9 oz)  Body mass index is 26.45 kg/m².    Intake/Output Summary (Last 24 hours) at 10/23/2022 1506  Last data filed at 10/23/2022 0800  Gross per 24 hour   Intake 100 ml   Output --   Net 100 ml        Physical Exam  Constitutional:       Comments: Well-appearing elderly female in no acute distress cooperative with exam   HENT:      Head: Normocephalic and atraumatic.   Eyes:      Extraocular Movements: Extraocular movements intact.      Pupils: Pupils are equal, round, and reactive to light.   Cardiovascular:      Rate and Rhythm: Normal rate and regular rhythm.   Pulmonary:      Effort: Pulmonary effort is normal.      Breath sounds: Normal breath sounds.   Abdominal:      General: Abdomen is flat. Bowel sounds are normal.      Palpations: Abdomen is soft.   Skin:     General: Skin is warm and dry.   Neurological:      General: No focal deficit present.       Significant Labs: All pertinent labs within the past 24 hours have been reviewed.  CBC:   Recent Labs   Lab 10/22/22  0053 10/23/22  1130   WBC 28.37* 28.97*   HGB 9.6* 8.6*   HCT 28.3* 24.5*   * 507*       CMP:   Recent Labs   Lab 10/22/22  0053 10/23/22  0815   *  --    K 3.6  --      --    CO2 14*  --    *  --    BUN 53*  --    CREATININE 2.8* 3.0*   CALCIUM 8.6*  --    PROT 7.0  --    ALBUMIN 1.9*  --    BILITOT 1.4*  --    ALKPHOS 105  --    *  --    ALT 81*  --    ANIONGAP 14  --        Magnesium: No results for input(s): MG in the last 48 hours.    Significant Imaging: I have reviewed all pertinent imaging results/findings within the past 24 hours.      Assessment/Plan:      Sepsis due to pneumonia  Multifocal pneumonia     This patient does have evidence of infective focus  My overall impression is sepsis. Vital signs were reviewed and noted in progress note.  2/4 SIRS: tachycardia and leukocytosis  On 5L NC, wean as tolerated  Antibiotics given-              Antibiotics (From admission, onward)     Start     Stop Route Frequency Ordered     10/18/22 0900   mupirocin 2 % ointment         10/23 0859 Nasl 2 times daily 10/17/22 2236     10/18/22 0200   cefepime in dextrose 5 % 1 gram/50 mL IVPB 1 g         -- IV Every 12 hours (non-standard times) 10/17/22 1423     10/17/22 1201   vancomycin - pharmacy to dose  (vancomycin IVPB)        See Hyperspace for full Linked Orders Report.    -- IV pharmacy to manage frequency 10/17/22 1102          Cultures were taken-            Microbiology Results (last 7 days)      Procedure Component Value Units Date/Time     Influenza A & B by Molecular [[id number]] Collected: 10/17/22 2308     Order Status: Completed Specimen: Nasal Swab Updated: 10/18/22 0010       Influenza A, Molecular Negative       Influenza B, Molecular Negative       Flu A & B Source Nasal swab     Blood culture #2 **CANNOT BE ORDERED STAT** [[id number]] Collected: 10/17/22 1201     Order Status: Completed Specimen: Blood from Peripheral, Antecubital, Left Updated: 10/17/22 1915       Blood Culture, Routine No Growth to date     Blood culture #1 **CANNOT BE ORDERED STAT** [[id number]] Collected: 10/17/22 1201     Order Status: Completed Specimen: Blood from Peripheral, Forearm, Left Updated: 10/17/22 1915       Blood Culture, Routine No Growth to date     Urine culture [[id number]] Collected: 10/17/22 1437     Order Status: No result Specimen: Urine Updated: 10/17/22 1523     Culture, Respiratory with Gram Stain [[id number]]       Order Status: No result Specimen: Respiratory from Sputum       Influenza A & B by Molecular [[id number]] Collected: 10/17/22 1023     Order Status:  Canceled Specimen: Nasopharyngeal Swab            Latest lactate reviewed, they are-      Recent Labs   Lab 10/17/22  1200   LACTATE 0.9         Organ dysfunction indicated by Acute kidney injury and elevated troponin  Source- mulitfocal pneunoma     Source control Achieved by- vanc and cefepime        Lymphadenopathy of head and neck  Has bilateral neck gland swelling with tenderness,consulted ENT.     Hyponatremia  Likely multifactorial etiology  Continue to monitor electrolytes     Acute GI bleeding  Positive melena  Not medically stable for endoscopy  Monitoring serial H&H, decreased but no need for transfusion today        Acute renal failure superimposed on stage 3 chronic kidney disease  She has has ARF on CKD 3,will monitor while is on IVF and consult nephrology in AM if not improved in AM.     Acute blood loss anemia  - patient's anemia is currently uncontrolled. S/p 0 units of PRBCs.   - etiology likely due to hemoptysis  - monitor closely  - monitor serial CBC and transfuse if patient becomes hemodynamically unstable, symptomatic, or H/H drops below 7/21.   No need for transfusion at this time     Multifocal pneumonia  CT chest show extensive pneumonia,patient has been  started on broad spectrum IV Abx with cefepime and vancomycin,cultures are pending  Appreciate ID evaluation and recommendations  Discontinued  Vancomycin       Chronic diastolic heart failure  - EF 65% per most recent echo in 2020      - clinically euvolemic on admission  -   - troponin 0.061  - EKG NSR  - continue metoprolol succinate, not currently on diuretics  - repeat echo ordered  - strict I/Os  - 1.5L fluid restriction   - discontinue IV fluids        CKD (chronic kidney disease), stage III  -Unsure if there is an acute component to the chronic condition  -Renal function continues to worsen acutely appreciate Nephrology consult and recommendations  -evaluation in progress for inflammatory etiologies       HTN  (hypertension)  - BP currently not well-controlled  - continue home regimen of amlodipine 10 mg daily, hydralazine TID, metoprolol succinate 50 mg  - holding home lisinopril 40 mg daily   -better control, but still suboptimal        Hypothyroid  - continue synthroid 25 mcg        Lab Results   Component Value Date     TSH 1.479 08/02/2022                      VTE Risk Mitigation (From admission, onward)                  Ordered        IP VTE HIGH RISK PATIENT  Once         10/17/22 1354        Reason for No Pharmacological VTE Prophylaxis  Once        Question:  Reasons:  Answer:  Risk of Bleeding    10/17/22 1354                     Discharge Planning   ANTHONY: 10/24/2022     Code Status: Full Code   Is the patient medically ready for discharge?: No    Reason for patient still in hospital (select all that apply): Patient trending condition and Treatment  Discharge Plan A: Home with family      Michelle Ocasio MD, FACP, [facility], [facility]  Senior Hospitalist  Department of Hospital Medicine        Department of Hospital Medicine   [facility] - Intensive Care ([place]

## 2022-10-23 NOTE — HPI
77 year old female with a history of hypothyroidism, HTN, and CHF. 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 She now has acute renal failure, non oliguric at this time. Patient started complaining of dysuria, with mild burning and some pressure at the suprapubic area occasionally while urinating for the last 3-4 days. She states this is getting better. She denies fevers, chills, rigors, night sweats, flank pain, hematuria, history of kidney stones or prior bladder manipulation. Has 1 dog at home, prior smoker and reported family hx of cancers (breast, prostate).     Review of Microbiology shows Ucx Collected: 12/09/22 1806- E coli ESBL. She has no history of prior ESBL infections. She remains afebrile, and has not had any significant leukocytosis since october. She has unclear allergies to sulfa or pcn (?rash).     ID consulted for s/p anuric ARF now with symptomatic UTI as UOP increased. Resistant E.coli with renal function and allergies limiting options. Dysuria may be improving without effective treatment.

## 2022-10-23 NOTE — ASSESSMENT & PLAN NOTE
Unclear etiology at this time - resp cx with normal resp zheng and blcx ngtd. Given that pt has not improved while on abx (PO levo as an outpatient and current course), reported transient joint pain, renal injury/congestion at home, and hemoptysis, consider non-infectious (autoimmune) work up for hemoptysis. TTE with pulm HTN.      Recommendations:  -respiratory infection panel (given close contact with children, though pt has had symptoms since September) and fungal markers for completeness (immunodiffusion, blasto/histo urine ag)  -stop vanc, no staph isolated  -ok to continue cefepime in the mean time  -consider non-infectious work up; if pt does not improve, may need pulm input for consideration for bronchoscopy

## 2022-10-23 NOTE — ASSESSMENT & PLAN NOTE
Worsening leukocytosis despite broad-spectrum antibiotics  Remains on cefepime  - management per primary

## 2022-10-23 NOTE — PROGRESS NOTES
Pharmacist Renal Dose Adjustment Note    Kristin Goodman is a 77 y.o. female being treated with the medication cefepime    Patient Data:    Vital Signs (Most Recent):  Temp: 98 °F (36.7 °C) (10/23/22 1348)  Pulse: 67 (10/23/22 1348)  Resp: 20 (10/23/22 1348)  BP: 131/60 (10/23/22 1348)  SpO2: 96 % (10/23/22 1348) Vital Signs (72h Range):  Temp:  [97.6 °F (36.4 °C)-99.7 °F (37.6 °C)]   Pulse:  []   Resp:  [17-36]   BP: (131-201)/(60-86)   SpO2:  [90 %-100 %]      Recent Labs   Lab 10/21/22  0845 10/22/22  0053 10/23/22  0815   CREATININE 2.7* 2.8* 3.0*     Serum creatinine: 3 mg/dL (H) 10/23/22 0815  Estimated creatinine clearance: 13.8 mL/min (A)    Medication: cefepime dose: 1 g frequency Q12H will be changed to medication: cefepime dose: 1 g frequency: Q24H    Pharmacist's Name: Ayah Dupree  Pharmacist's Extension: [phone]
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
## 2022-10-23 NOTE — HPI
Kristin Goodman is a 77 year old female with hypertension, hypothyroidism, HFpEF who presents for cough, shortness of breath, and weakness.  Patient initially presented to ER on 09/24 with hemoptysis and imaging concerning for multilobar pneumonia at which time she was discharged on a 10 day course of levofloxacin.  Patient initially had some improvement but began having worsening symptoms on 10/14.  Patient describes multiple fevers and progressive shortness of breath.  She continues to have intermittent hemoptysis.  Patient with worsening leukocytosis and limited clinical improvement despite broad-spectrum antibiotics.  She remains on cefepime.  Patient is a noted to have baseline creatinine of 1 as recent as 8/2022 but progressive worsening of creatinine in early October.  On admission patient with creatinine of 1.6 (10/17) with subsequent progression and creatinine of 3.0 at time of consultation (10/23).  Nephrology consulted for acute kidney injury.

## 2022-10-23 NOTE — SUBJECTIVE & OBJECTIVE
Interval History: Patient resting comfortably this a.m.     Review of Systems   Constitutional:  Positive for fatigue. Negative for fever.   Respiratory:  Positive for cough.         Hemoptysis   Objective:     Vital Signs (Most Recent):  Temp: 98.7 °F (37.1 °C) (10/22/22 1710)  Pulse: 93 (10/22/22 1720)  Resp: 18 (10/22/22 1710)  BP: (!) 201/85 (10/22/22 1710)  SpO2: (!) 94 % (10/22/22 1710)   Vital Signs (24h Range):  Temp:  [98.7 °F (37.1 °C)-99.7 °F (37.6 °C)] 98.7 °F (37.1 °C)  Pulse:  [] 93  Resp:  [17-26] 18  SpO2:  [92 %-100 %] 94 %  BP: (144-201)/(67-85) 201/85     Weight: 63.5 kg (139 lb 15.9 oz)  Body mass index is 26.45 kg/m².    Intake/Output Summary (Last 24 hours) at 10/22/2022 1924  Last data filed at 10/22/2022 1309  Gross per 24 hour   Intake 120 ml   Output 0 ml   Net 120 ml      Physical Exam  Constitutional:       Comments: Well-appearing elderly female in no acute distress cooperative with exam   HENT:      Head: Normocephalic and atraumatic.   Eyes:      Extraocular Movements: Extraocular movements intact.      Pupils: Pupils are equal, round, and reactive to light.   Cardiovascular:      Rate and Rhythm: Normal rate and regular rhythm.   Pulmonary:      Effort: Pulmonary effort is normal.      Breath sounds: Normal breath sounds.   Abdominal:      General: Abdomen is flat. Bowel sounds are normal.      Palpations: Abdomen is soft.   Skin:     General: Skin is warm and dry.   Neurological:      General: No focal deficit present.       Significant Labs: All pertinent labs within the past 24 hours have been reviewed.  CBC:   Recent Labs   Lab 10/21/22  0845 10/22/22  0053   WBC 25.94* 28.37*   HGB 10.7* 9.6*   HCT 31.9* 28.3*   * 457*     CMP:   Recent Labs   Lab 10/21/22  0845 10/22/22  0053   * 129*   K 3.2* 3.6   CL 97 101   CO2 16* 14*   * 117*   BUN 50* 53*   CREATININE 2.7* 2.8*   CALCIUM 8.9 8.6*   PROT  --  7.0   ALBUMIN  --  1.9*   BILITOT  --  1.4*   ALKPHOS   --  105   AST  --  158*   ALT  --  81*   ANIONGAP 16 14     Magnesium: No results for input(s): MG in the last 48 hours.    Significant Imaging: I have reviewed all pertinent imaging results/findings within the past 24 hours.

## 2022-10-23 NOTE — ASSESSMENT & PLAN NOTE
Patient with worsening hyponatremia since admission.  Baseline sodium 140, on admission 135 worsening to 126 but subsequently up trending to 129 at the time of consultation (10/23).     - recommend repeating urine sodium and osmolality, serum osmolality  - daily BMP

## 2022-10-23 NOTE — CARE UPDATE
"RAPID RESPONSE NURSE AI ALERT       AI alert received.    Chart Reviewed: 10/23/2022, 1:18 PM    MRN: [id number]  Bed: 60006/58883 A    Dx: Sepsis due to pneumonia    Kristin Goodman has a past medical history of Acute blood loss anemia, Allergy, Back pain, Chronic diastolic heart failure, Chronic diastolic heart failure, Colon polyp, Disorder of kidney and ureter, H/O Bell's palsy, Helicobacter pylori (H. pylori), HTN (hypertension), Hypothyroid, OA (osteoarthritis), DONAVAN (obstructive sleep apnea), Pneumonia due to other staphylococcus, Pulmonary HTN, Sepsis due to pneumonia, Trouble in sleeping, and Urinary incontinence.    Last VS: BP (!) 190/85   Pulse 83   Temp 98.7 °F (37.1 °C) (Axillary)   Resp 20   Ht 5' 1" (1.549 m)   Wt 63.5 kg (139 lb 15.9 oz)   SpO2 96%   BMI 26.45 kg/m²     24H Vital Sign Range:  Temp:  [98 °F (36.7 °C)-98.7 °F (37.1 °C)]   Pulse:  [82-99]   Resp:  [18-36]   BP: (167-201)/(60-86)   SpO2:  [93 %-98 %]     Level of Consciousness (AVPU): alert    Recent Labs     10/21/22  0845 10/22/22  0053 10/23/22  1130   WBC 25.94* 28.37* 28.97*   HGB 10.7* 9.6* 8.6*   HCT 31.9* 28.3* 24.5*   * 457* 507*       Recent Labs     10/21/22  0845 10/22/22  0053 10/23/22  0815   * 129*  --    K 3.2* 3.6  --    CL 97 101  --    CO2 16* 14*  --    CREATININE 2.7* 2.8* 3.0*   * 117*  --         No results for input(s): PH, PCO2, PO2, HCO3, POCSATURATED, BE in the last 72 hours.     OXYGEN:  Flow (L/min): 2     O2 Device (Oxygen Therapy): nasal cannula    MEWS score: 1    bedside RN, Jennifer  contacted. No concerns verbalized at this time. Instructed to call [phone] for further concerns or assistance.    Lashaun Faustin RN       "

## 2022-10-23 NOTE — PROGRESS NOTES
Therapy with vanc complete and/or consult discontinued by provider.  Pharmacy will sign off, please re-consult as needed.

## 2022-10-23 NOTE — PROGRESS NOTES
[facility] - [facility] ([address] Medicine  Progress Note    Patient Name: Kristin Goodman  MRN: [id number]  Patient Class: IP- Inpatient   Admission Date: 10/17/2022  Length of Stay: 5 days  Attending Physician: Michelle Ocasio MD  Primary Care Provider: Lori Hernandez MD        Subjective:     Principal Problem:Sepsis due to pneumonia        HPI:  Kristin Goodman is a 77 y.o. female with a past medical history of HTN, hypothyroidism, HFpEF, and osteoarthritis of the arm who has presented to the ED for cough, SOB, and weakness. Daughter is present at the bedside. Patient presented to the ED on 9/24 with hemoptysis, CT and CXR showed high suspicion for multilobar pneumonia. Patient was discharged with a 10-day course of levofloxacin and an albuterol inhaler. Patient followed up with her PCP on 10/4 with slight improvement in symptoms and went back to work. Patient continued to have worsening symptoms and presented to the ED again on 10/14 for evaluation and no interventions were done. Patient endorses fevers over the last few days up to 102.4 F with progressive SOB. She endorses hemoptysis with moderate amount of blood, generalized weakness, productive cough, SOB, and loss of appetite. Denies chest pain, nausea, vomiting, abdominal pain, or urinary changes.    ED: hypertensive up to 219/93 and tachycardic up to 117. Oxygen saturation on 92% on RA, placed on 5L NC with sats >97%. CBC remarkable for leukocytosis of 16.03 and Hb 7.7. K 3.3. Cr 1.6, baseline ~1.0. . Troponin 0.061. COVID and flu negative. EKG NSR. CT chest with contrast pending at time of admission. Given home amlodipine and lisinopril. Given 500mL NS, IV azithromycin, cefepime and vanc.       Overview/Hospital Course:  Kristin Goodman is a 77 y.o. female with a past medical history of HTN, hypothyroidism, HFpEF, and osteoarthritis of the arm who has presented to the ED for cough, SOB, and weakness. Daughter is present at the  bedside. Patient presented to the ED on 9/24 with hemoptysis, CT and CXR showed high suspicion for multilobar pneumonia. Patient was discharged with a 10-day course of levofloxacin and an albuterol inhaler. Patient followed up with her PCP on 10/4 with slight improvement in symptoms and went back to work. Patient continued to have worsening symptoms and presented to the ED again on 10/14 for evaluation and no interventions were done. Patient endorses fevers over the last few days up to 102.4 F with progressive SOB. She endorses hemoptysis with moderate amount of blood, generalized weakness, productive cough, SOB, and loss of appetite. Denies chest pain, nausea, vomiting, abdominal pain, or urinary changes.CT chest show extensive pneumonia,patient has been  started on broad spectrum IV Abx with cefepime and vancomycin,cultures are pending,fever is resolved,elevated BP is better controlled.  Starting having melena,HH is dropped,transfused 2 PRBC,HH is improved,started on Protonix gtt and consulted GI.per GI not candidate for EGD at this time,duo to pneumonia and hypoxia,will be monitored.  Hyponatremia minimally impacted by IV hydration so fluids discontinued.    Has bilateral neck gland swelling with tenderness,consulted ENT.  WBCs continue to increase to 28 with intermittent hemoptysis.      Interval History: Patient resting comfortably this a.m.     Review of Systems   Constitutional:  Positive for fatigue. Negative for fever.   Respiratory:  Positive for cough.         Hemoptysis   Objective:     Vital Signs (Most Recent):  Temp: 98.7 °F (37.1 °C) (10/22/22 1710)  Pulse: 93 (10/22/22 1720)  Resp: 18 (10/22/22 1710)  BP: (!) 201/85 (10/22/22 1710)  SpO2: (!) 94 % (10/22/22 1710)   Vital Signs (24h Range):  Temp:  [98.7 °F (37.1 °C)-99.7 °F (37.6 °C)] 98.7 °F (37.1 °C)  Pulse:  [] 93  Resp:  [17-26] 18  SpO2:  [92 %-100 %] 94 %  BP: (144-201)/(67-85) 201/85     Weight: 63.5 kg (139 lb 15.9 oz)  Body mass index  is 26.45 kg/m².    Intake/Output Summary (Last 24 hours) at 10/22/2022 1924  Last data filed at 10/22/2022 1309  Gross per 24 hour   Intake 120 ml   Output 0 ml   Net 120 ml      Physical Exam  Constitutional:       Comments: Well-appearing elderly female in no acute distress cooperative with exam   HENT:      Head: Normocephalic and atraumatic.   Eyes:      Extraocular Movements: Extraocular movements intact.      Pupils: Pupils are equal, round, and reactive to light.   Cardiovascular:      Rate and Rhythm: Normal rate and regular rhythm.   Pulmonary:      Effort: Pulmonary effort is normal.      Breath sounds: Normal breath sounds.   Abdominal:      General: Abdomen is flat. Bowel sounds are normal.      Palpations: Abdomen is soft.   Skin:     General: Skin is warm and dry.   Neurological:      General: No focal deficit present.       Significant Labs: All pertinent labs within the past 24 hours have been reviewed.  CBC:   Recent Labs   Lab 10/21/22  0845 10/22/22  0053   WBC 25.94* 28.37*   HGB 10.7* 9.6*   HCT 31.9* 28.3*   * 457*     CMP:   Recent Labs   Lab 10/21/22  0845 10/22/22  0053   * 129*   K 3.2* 3.6   CL 97 101   CO2 16* 14*   * 117*   BUN 50* 53*   CREATININE 2.7* 2.8*   CALCIUM 8.9 8.6*   PROT  --  7.0   ALBUMIN  --  1.9*   BILITOT  --  1.4*   ALKPHOS  --  105   AST  --  158*   ALT  --  81*   ANIONGAP 16 14     Magnesium: No results for input(s): MG in the last 48 hours.    Significant Imaging: I have reviewed all pertinent imaging results/findings within the past 24 hours.      Assessment/Plan:      * Sepsis due to pneumonia  Multifocal pneumonia    This patient does have evidence of infective focus  My overall impression is sepsis. Vital signs were reviewed and noted in progress note.  2/4 SIRS: tachycardia and leukocytosis  On 5L NC, wean as tolerated  Antibiotics given-   Antibiotics (From admission, onward)    Start     Stop Route Frequency Ordered    10/18/22 0900   mupirocin 2 % ointment         10/23 0859 Nasl 2 times daily 10/17/22 2236    10/18/22 0200  cefepime in dextrose 5 % 1 gram/50 mL IVPB 1 g         -- IV Every 12 hours (non-standard times) 10/17/22 1423    10/17/22 1201  vancomycin - pharmacy to dose  (vancomycin IVPB)        See Hyperspace for full Linked Orders Report.    -- IV pharmacy to manage frequency 10/17/22 1102        Cultures were taken-   Microbiology Results (last 7 days)     Procedure Component Value Units Date/Time    Influenza A & B by Molecular [[id number]] Collected: 10/17/22 2308    Order Status: Completed Specimen: Nasal Swab Updated: 10/18/22 0010     Influenza A, Molecular Negative     Influenza B, Molecular Negative     Flu A & B Source Nasal swab    Blood culture #2 **CANNOT BE ORDERED STAT** [[id number]] Collected: 10/17/22 1201    Order Status: Completed Specimen: Blood from Peripheral, Antecubital, Left Updated: 10/17/22 1915     Blood Culture, Routine No Growth to date    Blood culture #1 **CANNOT BE ORDERED STAT** [[id number]] Collected: 10/17/22 1201    Order Status: Completed Specimen: Blood from Peripheral, Forearm, Left Updated: 10/17/22 1915     Blood Culture, Routine No Growth to date    Urine culture [[id number]] Collected: 10/17/22 1437    Order Status: No result Specimen: Urine Updated: 10/17/22 1523    Culture, Respiratory with Gram Stain [[id number]]     Order Status: No result Specimen: Respiratory from Sputum     Influenza A & B by Molecular [[id number]] Collected: 10/17/22 1023    Order Status: Canceled Specimen: Nasopharyngeal Swab         Latest lactate reviewed, they are-  Recent Labs   Lab 10/17/22  1200   LACTATE 0.9       Organ dysfunction indicated by Acute kidney injury and elevated troponin  Source- mulitfocal pneunoma    Source control Achieved by- vanc and cefepime      Lymphadenopathy of head and neck  Has bilateral neck gland swelling with tenderness,consulted ENT.    Hyponatremia  Likely multifactorial  etiology  Discontinue IV fluids  Continue to monitor electrolytes    Acute GI bleeding  Positive melena  Not medically stable for endoscopy  Monitoring serial H&H      Acute renal failure superimposed on stage 3 chronic kidney disease  She has has ARF on CKD 3,will monitor while is on IVF and consult nephrology in AM if not improved in AM.    Acute blood loss anemia  - patient's anemia is currently uncontrolled. S/p 0 units of PRBCs.   - etiology likely due to hemoptysis  - monitor closely  - monitor serial CBC and transfuse if patient becomes hemodynamically unstable, symptomatic, or H/H drops below 7/21.   No need for transfusion at this time    Multifocal pneumonia  CT chest show extensive pneumonia,patient has been  started on broad spectrum IV Abx with cefepime and vancomycin,cultures are pending  Patient afebrile, however white blood cell count increased to 28  Consult ID for further treatment recommendations and evaluation suggestions    Chronic diastolic heart failure  - EF 65% per most recent echo in 2020   - clinically euvolemic on admission  -   - troponin 0.061  - EKG NSR  - continue metoprolol succinate, not currently on diuretics  - repeat echo ordered  - strict I/Os  - 1.5L fluid restriction   - discontinue IV fluids      CKD (chronic kidney disease), stage III  -Unsure if there is an acute component to the chronic condition  -Continue to monitor electrolytes    HTN (hypertension)  - BP currently not well-controlled  - continue home regimen of amlodipine 10 mg daily, hydralazine TID, metoprolol succinate 50 mg  - holding home lisinopril 40 mg daily   - adjust anti-hypertensive regimen, current poor control      Hypothyroid  - continue synthroid 25 mcg  Lab Results   Component Value Date    TSH 1.479 08/02/2022             VTE Risk Mitigation (From admission, onward)         Ordered     IP VTE HIGH RISK PATIENT  Once         10/17/22 1354     Reason for No Pharmacological VTE Prophylaxis  Once         Question:  Reasons:  Answer:  Risk of Bleeding    10/17/22 1354                Discharge Planning   ANTHONY: 10/24/2022     Code Status: Full Code   Is the patient medically ready for discharge?: No    Reason for patient still in hospital (select all that apply): Patient trending condition and Treatment  Discharge Plan A: Home with family        Michelle Ocasio MD, FACP, [facility]  Senior Hospitalist  Department of Hospital Medicine      Department of Hospital Medicine   [facility] - Intensive Care ([place]-)
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
## 2022-10-23 NOTE — CARE UPDATE
RAPID RESPONSE NURSE PROACTIVE ROUNDING NOTE       Time of Visit: 1750    Admit Date: 10/17/2022  LOS: 6  Code Status: Full Code   Date of Visit: 10/23/2022  : 1945  Age: 77 y.o.  Sex: female  Race: Black or   Bed: 10862/36392 A:   MRN: [id number]  Was the patient discharged from an ICU this admission? No   Was the patient discharged from a PACU within last 24 hours? No   Did the patient receive conscious sedation/general anesthesia in last 24 hours? No  Was the patient in the ED within the past 24 hours? No  Was the patient on NIPPV within the past 24 hours? No   Attending Physician: Michelle Ocasio MD  Primary Service: [facility]   Time spent at the bedside: 15 -30 min    SITUATION    Notified by bedside RN via phone call.  Reason for alert: Respiratory Distress  Called to evaluate the patient for Respiratory    BACKGROUND     Why is the patient in the hospital?: Sepsis due to pneumonia    Patient has a past medical history of Acute blood loss anemia, Allergy, Back pain, Chronic diastolic heart failure, Chronic diastolic heart failure, Colon polyp, Disorder of kidney and ureter, H/O Bell's palsy, Helicobacter pylori (H. pylori), HTN (hypertension), Hypothyroid, OA (osteoarthritis), DONAVAN (obstructive sleep apnea), Pneumonia due to other staphylococcus, Pulmonary HTN, Sepsis due to pneumonia, Trouble in sleeping, and Urinary incontinence.    Last Vitals:  Temp: 97.8 °F (36.6 °C) (10/23 1550)  Pulse: 68 (10/23 1550)  Resp: 20 (10/23 1550)  BP: 148/67 (10/23 1550)  SpO2: 93 % (10/23 1550)    24 Hours Vitals Range:  Temp:  [97.8 °F (36.6 °C)-98.7 °F (37.1 °C)]   Pulse:  [67-97]   Resp:  [18-36]   BP: (131-197)/(60-86)   SpO2:  [93 %-98 %]     Labs:  Recent Labs     10/21/22  0845 10/22/22  0053 10/23/22  1130   WBC 25.94* 28.37* 28.97*   HGB 10.7* 9.6* 8.6*   HCT 31.9* 28.3* 24.5*   * 457* 507*       Recent Labs     10/21/22  0845 10/22/22  0053 10/23/22  0815   * 129*  --    K 3.2*  3.6  --    CL 97 101  --    CO2 16* 14*  --    CREATININE 2.7* 2.8* 3.0*   * 117*  --         No results for input(s): PH, PCO2, PO2, HCO3, POCSATURATED, BE in the last 72 hours.     ASSESSMENT  Coarse lung sounds, crackles noted      INTERVENTIONS    The patient was seen for Respiratory problem. Staff concerns included tachypnea, oxygen saturation < 90% despite supplemental oxygen, and increased WOB. The following interventions were performed: supplemental oxygen, POCT arterial blood gas , and continued pulse ox monitoring.    RECOMMENDATIONS  Patient sleepy but does arouse and answer questions, c/o SOB.  O2 sats 89-92% on 5L.  ABG, CMP, and lactic ordered. Patient placed on 50%VM.  Dr. Ocasio notified.  CCS notified and will evaluate patient.      PROVIDER ESCALATION    Yes/No  yes    Orders received and case discussed with  BOB Montano.    Disposition: Remain in room 48103.    FOLLOW-UP    charge Kamilla COLUNGA  updated on plan of care. Instructed to call the Rapid Response Nurse, Lashaun Faustin RN at [phone] for additional questions or concerns.

## 2022-10-23 NOTE — CARE UPDATE
"RAPID RESPONSE NURSE AI ALERT       AI alert received.    Chart Reviewed: 10/22/2022, 11:14 PM    MRN: [id number]  Bed: 59127/43988 A    Dx: Sepsis due to pneumonia    Kristin Goodman has a past medical history of Acute blood loss anemia, Allergy, Back pain, Chronic diastolic heart failure, Chronic diastolic heart failure, Colon polyp, Disorder of kidney and ureter, H/O Bell's palsy, Helicobacter pylori (H. pylori), HTN (hypertension), Hypothyroid, OA (osteoarthritis), DONAVAN (obstructive sleep apnea), Pneumonia due to other staphylococcus, Pulmonary HTN, Sepsis due to pneumonia, Trouble in sleeping, and Urinary incontinence.    Last VS: BP (!) 201/85   Pulse 93   Temp 98.7 °F (37.1 °C) (Oral)   Resp 18   Ht 5' 1" (1.549 m)   Wt 63.5 kg (139 lb 15.9 oz)   SpO2 (!) 94%   BMI 26.45 kg/m²     24H Vital Sign Range:  Temp:  [98.7 °F (37.1 °C)-99.7 °F (37.6 °C)]   Pulse:  [82-99]   Resp:  [17-24]   BP: (144-201)/(67-85)   SpO2:  [92 %-100 %]     Level of Consciousness (AVPU): alert    Recent Labs     10/20/22  0451 10/21/22  0845 10/22/22  0053   WBC 21.79* 25.94* 28.37*   HGB 10.3* 10.7* 9.6*   HCT 31.2* 31.9* 28.3*    483* 457*       Recent Labs     10/20/22  0451 10/21/22  0845 10/22/22  0053   * 129* 129*   K 3.6 3.2* 3.6   CL 97 97 101   CO2 19* 16* 14*   CREATININE 2.4* 2.7* 2.8*    121* 117*        No results for input(s): PH, PCO2, PO2, HCO3, POCSATURATED, BE in the last 72 hours.     OXYGEN:  Flow (L/min): 1     O2 Device (Oxygen Therapy): nasal cannula    MEWS score: 3    Bedside RN, Tiffanie  contacted. No concerns verbalized at this time. RN reports BP improved and will update VS. Instructed to call [phone] for further concerns or assistance.    Alessia Matos RN       "

## 2022-10-23 NOTE — PLAN OF CARE
Problem: Adult Inpatient Plan of Care  Goal: Plan of Care Review  Outcome: Ongoing, Progressing  Goal: Patient-Specific Goal (Individualized)  Outcome: Ongoing, Progressing  Goal: Absence of Hospital-Acquired Illness or Injury  Outcome: Ongoing, Progressing  Goal: Optimal Comfort and Wellbeing  Outcome: Ongoing, Progressing  Goal: Readiness for Transition of Care  Outcome: Ongoing, Progressing   0125  Notified MD pt c/o of pain, Blood pressure was elevated through the night. Prn medicine given.  New orders given for ekg, cxr and breathing tx.     Pt has been able to sleep the rest of the night without any complaints. VSS. Bed low, call light in reach.

## 2022-10-23 NOTE — ASSESSMENT & PLAN NOTE
Patient with baseline creatinine of 1 as recent as August 2022 with progressive worsening beginning in early October 1.3 (10/13)->1.6 (10/17)->3.0 (10/23) despite IV fluids.  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  Patient noted to have new onset proteinuria and hematuria over the last 1 month.  Additionally, would consider ATN in the setting of suspected sepsis from multifocal pneumonia.    - will plan to assess urine sediment  - please repeat urinalysis  - order UPCR  - serologies ordered (MITUL, ANCA, anti-GBM, IgA, C3, C4, HCV, HBV, cryo, RF)  - strict intake and output  - renally dose medications and avoid nephrotoxins when possible

## 2022-10-23 NOTE — TELEPHONE ENCOUNTER
----- Message from Gilson Larry MD sent at 10/22/2022 10:47 AM CDT -----  Regarding: RE: GI Clinic Follow-up  That works  ----- Message -----  From: Nhi Rosales MA  Sent: 10/21/2022   8:33 PM CDT  To: Gilson Larry MD  Subject: RE: GI Clinic Follow-up                          First available would be in Dec with one of the fellows  ----- Message -----  From: Gilson Larry MD  Sent: 10/21/2022  12:29 PM CDT  To: Nhi Rosales MA  Subject: GI Clinic Follow-up                              Cesar Pichardo,    This is for another GI clinic follow-up. Ms. Goodman with next available MD. Had an isolated episode of melena before she came into the hospital, none since. Needs to check blood counts and discuss possible EGD.    Thanks,  Gilson
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
## 2022-10-23 NOTE — RESPIRATORY THERAPY
RAPID RESPONSE NURSE PROACTIVE ROUNDING NOTE       Time of Visit: 1328    Admit Date: 10/17/2022  LOS: 6  Code Status: Full Code   Date of Visit: 10/23/2022  : 1945  Age: 77 y.o.  Sex: female  Race: Black or   Bed: 94159/17682 A:   MRN: [id number]  Was the patient discharged from an ICU this admission? No   Was the patient discharged from a PACU within last 24 hours? No   Did the patient receive conscious sedation/general anesthesia in last 24 hours? No   Was the patient in the ED within the past 24 hours? No   Was the patient on NIPPV within the past 24 hours? No   Attending Physician: Michelle Ocasio MD  Primary Service: [facility]   Time spent at the bedside: < 15 min    SITUATION    Notified by Epic patient alert  Reason for alert: low SpO    Diagnosis: Sepsis due to pneumonia   has a past medical history of Acute blood loss anemia, Allergy, Back pain, Chronic diastolic heart failure, Chronic diastolic heart failure, Colon polyp, Disorder of kidney and ureter, H/O Bell's palsy, Helicobacter pylori (H. pylori), HTN (hypertension), Hypothyroid, OA (osteoarthritis), DONAVAN (obstructive sleep apnea), Pneumonia due to other staphylococcus, Pulmonary HTN, Sepsis due to pneumonia, Trouble in sleeping, and Urinary incontinence.    Last Vitals:  Temp: 98 °F (36.7 °C) (10/23 1348)  Pulse: 67 (10/23 1348)  Resp: 20 (10/23 1348)  BP: 131/60 (10/23 1348)  SpO2: 96 % (10/23 134)    24 Hour Vitals Range:  Temp:  [98 °F (36.7 °C)-98.7 °F (37.1 °C)]   Pulse:  [67-99]   Resp:  [18-36]   BP: (131-201)/(60-86)   SpO2:  [93 %-98 %]     Clinical Issues: Respiratory    ASSESSMENT/INTERVENTIONS    Pt resting comfortably recovering from exertion d/t nursing care    RECOMMENDATIONS  Titrate O2 as appropriate    Discussed plan of care with bedside RN,      PROVIDER ESCALATION    Physician escalation: No    Orders received and case discussed with  Nursing .    Disposition:Remain in room 60907    FOLLOW UP

## 2022-10-23 NOTE — SUBJECTIVE & OBJECTIVE
Interval History: Patient resting comfortably this a.m.     Review of Systems   Constitutional:  Positive for fatigue. Negative for fever.   Respiratory:  Positive for cough.         Hemoptysis   Gastrointestinal:  Positive for abdominal pain and nausea.   Objective:     Vital Signs (Most Recent):  Temp: 98 °F (36.7 °C) (10/23/22 1348)  Pulse: 67 (10/23/22 1348)  Resp: 20 (10/23/22 1348)  BP: 131/60 (10/23/22 1348)  SpO2: 96 % (10/23/22 1348)   Vital Signs (24h Range):  Temp:  [98 °F (36.7 °C)-98.7 °F (37.1 °C)] 98 °F (36.7 °C)  Pulse:  [67-99] 67  Resp:  [18-36] 20  SpO2:  [93 %-98 %] 96 %  BP: (131-201)/(60-86) 131/60     Weight: 63.5 kg (139 lb 15.9 oz)  Body mass index is 26.45 kg/m².    Intake/Output Summary (Last 24 hours) at 10/23/2022 1506  Last data filed at 10/23/2022 0800  Gross per 24 hour   Intake 100 ml   Output --   Net 100 ml        Physical Exam  Constitutional:       Comments: Well-appearing elderly female in no acute distress cooperative with exam   HENT:      Head: Normocephalic and atraumatic.   Eyes:      Extraocular Movements: Extraocular movements intact.      Pupils: Pupils are equal, round, and reactive to light.   Cardiovascular:      Rate and Rhythm: Normal rate and regular rhythm.   Pulmonary:      Effort: Pulmonary effort is normal.      Breath sounds: Normal breath sounds.   Abdominal:      General: Abdomen is flat. Bowel sounds are normal.      Palpations: Abdomen is soft.   Skin:     General: Skin is warm and dry.   Neurological:      General: No focal deficit present.       Significant Labs: All pertinent labs within the past 24 hours have been reviewed.  CBC:   Recent Labs   Lab 10/22/22  0053 10/23/22  1130   WBC 28.37* 28.97*   HGB 9.6* 8.6*   HCT 28.3* 24.5*   * 507*       CMP:   Recent Labs   Lab 10/22/22  0053 10/23/22  0815   *  --    K 3.6  --      --    CO2 14*  --    *  --    BUN 53*  --    CREATININE 2.8* 3.0*   CALCIUM 8.6*  --    PROT 7.0  --     ALBUMIN 1.9*  --    BILITOT 1.4*  --    ALKPHOS 105  --    *  --    ALT 81*  --    ANIONGAP 14  --        Magnesium: No results for input(s): MG in the last 48 hours.    Significant Imaging: I have reviewed all pertinent imaging results/findings within the past 24 hours.
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
## 2022-10-24 LAB
ADENOVIRUS: NOT DETECTED
ALBUMIN SERPL BCP-MCNC: 1.8 G/DL (ref 3.5–5.2)
ALP SERPL-CCNC: 146 U/L (ref 55–135)
ALT SERPL W/O P-5'-P-CCNC: 164 U/L (ref 10–44)
ANA PATTERN 1: NORMAL
ANA SER QL IF: POSITIVE
ANA TITR SER IF: NORMAL {TITER}
ANION GAP SERPL CALC-SCNC: 17 MMOL/L (ref 8–16)
ANISOCYTOSIS BLD QL SMEAR: SLIGHT
APTT BLDCRRT: 46.4 SEC (ref 21–32)
AST SERPL-CCNC: 266 U/L (ref 10–40)
BACTERIA #/AREA URNS AUTO: ABNORMAL /HPF
BASOPHILS NFR BLD: 0 % (ref 0–1.9)
BILIRUB SERPL-MCNC: 3.1 MG/DL (ref 0.1–1)
BILIRUB UR QL STRIP: NEGATIVE
BNP SERPL-MCNC: 140 PG/ML (ref 0–99)
BORDETELLA PARAPERTUSSIS (IS1001): NOT DETECTED
BORDETELLA PERTUSSIS (PTXP): NOT DETECTED
BUN SERPL-MCNC: 96 MG/DL (ref 8–23)
CALCIUM SERPL-MCNC: 9.2 MG/DL (ref 8.7–10.5)
CHLAMYDIA PNEUMONIAE: NOT DETECTED
CHLORIDE SERPL-SCNC: 105 MMOL/L (ref 95–110)
CLARITY UR REFRACT.AUTO: ABNORMAL
CO2 SERPL-SCNC: 14 MMOL/L (ref 23–29)
COLOR UR AUTO: YELLOW
CORONAVIRUS 229E, COMMON COLD VIRUS: NOT DETECTED
CORONAVIRUS HKU1, COMMON COLD VIRUS: NOT DETECTED
CORONAVIRUS NL63, COMMON COLD VIRUS: NOT DETECTED
CORONAVIRUS OC43, COMMON COLD VIRUS: NOT DETECTED
CREAT SERPL-MCNC: 3.5 MG/DL (ref 0.5–1.4)
CREAT UR-MCNC: 13 MG/DL (ref 15–325)
CREAT UR-MCNC: 13 MG/DL (ref 15–325)
DIFFERENTIAL METHOD: ABNORMAL
EOSINOPHIL NFR BLD: 0 % (ref 0–8)
ERYTHROCYTE [DISTWIDTH] IN BLOOD BY AUTOMATED COUNT: 14.1 % (ref 11.5–14.5)
EST. GFR  (NO RACE VARIABLE): 13.1 ML/MIN/1.73 M^2
FLUBV RNA NPH QL NAA+NON-PROBE: NOT DETECTED
GIANT PLATELETS BLD QL SMEAR: PRESENT
GLUCOSE SERPL-MCNC: 174 MG/DL (ref 70–110)
GLUCOSE UR QL STRIP: NEGATIVE
HCT VFR BLD AUTO: 43.2 % (ref 37–48.5)
HGB BLD-MCNC: 14.6 G/DL (ref 12–16)
HGB UR QL STRIP: ABNORMAL
HPIV1 RNA NPH QL NAA+NON-PROBE: NOT DETECTED
HPIV2 RNA NPH QL NAA+NON-PROBE: NOT DETECTED
HPIV3 RNA NPH QL NAA+NON-PROBE: NOT DETECTED
HPIV4 RNA NPH QL NAA+NON-PROBE: NOT DETECTED
HUMAN METAPNEUMOVIRUS: NOT DETECTED
HYALINE CASTS UR QL AUTO: 2 /LPF
HYPOCHROMIA BLD QL SMEAR: ABNORMAL
IMM GRANULOCYTES # BLD AUTO: ABNORMAL K/UL (ref 0–0.04)
IMM GRANULOCYTES NFR BLD AUTO: ABNORMAL % (ref 0–0.5)
INFLUENZA A (SUBTYPES H1,H1-2009,H3): NOT DETECTED
INFLUENZA A, MOLECULAR: NEGATIVE
INFLUENZA B, MOLECULAR: NEGATIVE
INR PPP: 1.1 (ref 0.8–1.2)
KETONES UR QL STRIP: NEGATIVE
LEUKOCYTE ESTERASE UR QL STRIP: NEGATIVE
LYMPHOCYTES NFR BLD: 8 % (ref 18–48)
MCH RBC QN AUTO: 27.8 PG (ref 27–31)
MCHC RBC AUTO-ENTMCNC: 33.8 G/DL (ref 32–36)
MCV RBC AUTO: 82 FL (ref 82–98)
METAMYELOCYTES NFR BLD MANUAL: 1 %
MICROSCOPIC COMMENT: ABNORMAL
MONOCYTES NFR BLD: 8 % (ref 4–15)
MYCOPLASMA PNEUMONIAE: NOT DETECTED
MYELOCYTES NFR BLD MANUAL: 1 %
NEUTROPHILS NFR BLD: 76 % (ref 38–73)
NEUTS BAND NFR BLD MANUAL: 5 %
NITRITE UR QL STRIP: NEGATIVE
NRBC BLD-RTO: 1 /100 WBC
OSMOLALITY UR: 322 MOSM/KG (ref 50–1200)
OVALOCYTES BLD QL SMEAR: ABNORMAL
PH UR STRIP: 5 [PH] (ref 5–8)
PLATELET # BLD AUTO: 495 K/UL (ref 150–450)
PLATELET BLD QL SMEAR: ABNORMAL
PMV BLD AUTO: 10.3 FL (ref 9.2–12.9)
POCT GLUCOSE: 195 MG/DL (ref 70–110)
POIKILOCYTOSIS BLD QL SMEAR: SLIGHT
POLYCHROMASIA BLD QL SMEAR: ABNORMAL
POTASSIUM SERPL-SCNC: 3.6 MMOL/L (ref 3.5–5.1)
POTASSIUM SERPL-SCNC: 4.3 MMOL/L (ref 3.5–5.1)
PROMYELOCYTES NFR BLD MANUAL: 1 %
PROT SERPL-MCNC: 7.7 G/DL (ref 6–8.4)
PROT UR QL STRIP: ABNORMAL
PROT UR-MCNC: 20 MG/DL (ref 0–15)
PROT/CREAT UR: 1.54 MG/G{CREAT} (ref 0–0.2)
PROTHROMBIN TIME: 11.6 SEC (ref 9–12.5)
RBC # BLD AUTO: 5.26 M/UL (ref 4–5.4)
RBC #/AREA URNS AUTO: 88 /HPF (ref 0–4)
RESPIRATORY INFECTION PANEL SOURCE: NORMAL
RSV RNA NPH QL NAA+NON-PROBE: NOT DETECTED
RV+EV RNA NPH QL NAA+NON-PROBE: NOT DETECTED
SARS-COV-2 RNA RESP QL NAA+PROBE: NOT DETECTED
SODIUM SERPL-SCNC: 136 MMOL/L (ref 136–145)
SODIUM UR-SCNC: 115 MMOL/L (ref 20–250)
SP GR UR STRIP: 1.01 (ref 1–1.03)
SPECIMEN SOURCE: NORMAL
SPHEROCYTES BLD QL SMEAR: ABNORMAL
SQUAMOUS #/AREA URNS AUTO: 1 /HPF
TARGETS BLD QL SMEAR: ABNORMAL
TROPONIN I SERPL DL<=0.01 NG/ML-MCNC: 0.01 NG/ML (ref 0–0.03)
UNSPECIFIED CRY UR QL COMP ASSIST: 43
URN SPEC COLLECT METH UR: ABNORMAL
UUN UR-MCNC: 167 MG/DL (ref 140–1050)
WBC # BLD AUTO: 17.6 K/UL (ref 3.9–12.7)
WBC #/AREA URNS AUTO: 18 /HPF (ref 0–5)
YEAST UR QL AUTO: ABNORMAL

## 2022-10-24 PROCEDURE — 63600175 PHARM REV CODE 636 W HCPCS

## 2022-10-24 PROCEDURE — 99233 PR SUBSEQUENT HOSPITAL CARE,LEVL III: ICD-10-PCS | Mod: GC,,, | Performed by: INTERNAL MEDICINE

## 2022-10-24 PROCEDURE — 87502 INFLUENZA DNA AMP PROBE: CPT | Performed by: STUDENT IN AN ORGANIZED HEALTH CARE EDUCATION/TRAINING PROGRAM

## 2022-10-24 PROCEDURE — 94660 CPAP INITIATION&MGMT: CPT

## 2022-10-24 PROCEDURE — 85007 BL SMEAR W/DIFF WBC COUNT: CPT | Performed by: INTERNAL MEDICINE

## 2022-10-24 PROCEDURE — 87449 NOS EACH ORGANISM AG IA: CPT

## 2022-10-24 PROCEDURE — 63600175 PHARM REV CODE 636 W HCPCS: Performed by: STUDENT IN AN ORGANIZED HEALTH CARE EDUCATION/TRAINING PROGRAM

## 2022-10-24 PROCEDURE — 25000003 PHARM REV CODE 250: Performed by: INTERNAL MEDICINE

## 2022-10-24 PROCEDURE — 87449 NOS EACH ORGANISM AG IA: CPT | Mod: 91 | Performed by: STUDENT IN AN ORGANIZED HEALTH CARE EDUCATION/TRAINING PROGRAM

## 2022-10-24 PROCEDURE — 25000003 PHARM REV CODE 250: Performed by: HOSPITALIST

## 2022-10-24 PROCEDURE — 20000000 HC ICU ROOM

## 2022-10-24 PROCEDURE — 63600175 PHARM REV CODE 636 W HCPCS: Performed by: NURSE PRACTITIONER

## 2022-10-24 PROCEDURE — 99222 1ST HOSP IP/OBS MODERATE 55: CPT | Mod: ,,, | Performed by: INTERNAL MEDICINE

## 2022-10-24 PROCEDURE — 99900035 HC TECH TIME PER 15 MIN (STAT)

## 2022-10-24 PROCEDURE — 99233 SBSQ HOSP IP/OBS HIGH 50: CPT | Mod: GC,,, | Performed by: INTERNAL MEDICINE

## 2022-10-24 PROCEDURE — 99222 PR INITIAL HOSPITAL CARE,LEVL II: ICD-10-PCS | Mod: ,,, | Performed by: INTERNAL MEDICINE

## 2022-10-24 PROCEDURE — C9113 INJ PANTOPRAZOLE SODIUM, VIA: HCPCS | Performed by: NURSE PRACTITIONER

## 2022-10-24 PROCEDURE — 94761 N-INVAS EAR/PLS OXIMETRY MLT: CPT

## 2022-10-24 PROCEDURE — 87385 HISTOPLASMA CAPSUL AG IA: CPT | Performed by: STUDENT IN AN ORGANIZED HEALTH CARE EDUCATION/TRAINING PROGRAM

## 2022-10-24 PROCEDURE — 99291 PR CRITICAL CARE, E/M 30-74 MINUTES: ICD-10-PCS | Mod: GC,,, | Performed by: INTERNAL MEDICINE

## 2022-10-24 PROCEDURE — 27000221 HC OXYGEN, UP TO 24 HOURS

## 2022-10-24 PROCEDURE — 87633 RESP VIRUS 12-25 TARGETS: CPT | Performed by: STUDENT IN AN ORGANIZED HEALTH CARE EDUCATION/TRAINING PROGRAM

## 2022-10-24 PROCEDURE — 99291 CRITICAL CARE FIRST HOUR: CPT | Mod: GC,,, | Performed by: INTERNAL MEDICINE

## 2022-10-24 PROCEDURE — 87798 DETECT AGENT NOS DNA AMP: CPT | Mod: 59 | Performed by: STUDENT IN AN ORGANIZED HEALTH CARE EDUCATION/TRAINING PROGRAM

## 2022-10-24 PROCEDURE — 80053 COMPREHEN METABOLIC PANEL: CPT | Performed by: INTERNAL MEDICINE

## 2022-10-24 PROCEDURE — 85027 COMPLETE CBC AUTOMATED: CPT | Performed by: INTERNAL MEDICINE

## 2022-10-24 PROCEDURE — 63600175 PHARM REV CODE 636 W HCPCS: Performed by: INTERNAL MEDICINE

## 2022-10-24 PROCEDURE — 25000003 PHARM REV CODE 250: Performed by: NURSE PRACTITIONER

## 2022-10-24 PROCEDURE — 25000003 PHARM REV CODE 250: Performed by: EMERGENCY MEDICINE

## 2022-10-24 PROCEDURE — 84132 ASSAY OF SERUM POTASSIUM: CPT | Performed by: EMERGENCY MEDICINE

## 2022-10-24 PROCEDURE — 85610 PROTHROMBIN TIME: CPT | Performed by: INTERNAL MEDICINE

## 2022-10-24 PROCEDURE — 85730 THROMBOPLASTIN TIME PARTIAL: CPT | Performed by: INTERNAL MEDICINE

## 2022-10-24 RX ORDER — INSULIN ASPART 100 [IU]/ML
1-10 INJECTION, SOLUTION INTRAVENOUS; SUBCUTANEOUS EVERY 6 HOURS PRN
Status: DISCONTINUED | OUTPATIENT
Start: 2022-10-24 | End: 2022-12-05

## 2022-10-24 RX ORDER — GLUCAGON 1 MG
1 KIT INJECTION
Status: DISCONTINUED | OUTPATIENT
Start: 2022-10-24 | End: 2022-12-09

## 2022-10-24 RX ORDER — POTASSIUM CHLORIDE 7.45 MG/ML
10 INJECTION INTRAVENOUS
Status: COMPLETED | OUTPATIENT
Start: 2022-10-24 | End: 2022-10-24

## 2022-10-24 RX ORDER — OMEGA-3-ACID ETHYL ESTERS 1 G/1
1 CAPSULE, LIQUID FILLED ORAL DAILY
Status: DISCONTINUED | OUTPATIENT
Start: 2022-10-24 | End: 2022-10-26

## 2022-10-24 RX ORDER — CEFEPIME HYDROCHLORIDE 1 G/50ML
2 INJECTION, SOLUTION INTRAVENOUS
Status: CANCELLED | OUTPATIENT
Start: 2022-10-24

## 2022-10-24 RX ORDER — PANTOPRAZOLE SODIUM 40 MG/10ML
40 INJECTION, POWDER, LYOPHILIZED, FOR SOLUTION INTRAVENOUS DAILY
Status: DISCONTINUED | OUTPATIENT
Start: 2022-10-25 | End: 2022-10-25

## 2022-10-24 RX ADMIN — PROCHLORPERAZINE EDISYLATE 5 MG: 5 INJECTION INTRAMUSCULAR; INTRAVENOUS at 12:10

## 2022-10-24 RX ADMIN — AMLODIPINE BESYLATE 10 MG: 10 TABLET ORAL at 08:10

## 2022-10-24 RX ADMIN — SUCRALFATE 1 G: 1 SUSPENSION ORAL at 12:10

## 2022-10-24 RX ADMIN — POTASSIUM CHLORIDE 10 MEQ: 7.46 INJECTION, SOLUTION INTRAVENOUS at 08:10

## 2022-10-24 RX ADMIN — HYDRALAZINE HYDROCHLORIDE 100 MG: 50 TABLET ORAL at 08:10

## 2022-10-24 RX ADMIN — DEXTROSE 500 MG: 50 INJECTION, SOLUTION INTRAVENOUS at 07:10

## 2022-10-24 RX ADMIN — PANTOPRAZOLE SODIUM 40 MG: 40 INJECTION, POWDER, FOR SOLUTION INTRAVENOUS at 08:10

## 2022-10-24 RX ADMIN — POTASSIUM CHLORIDE 10 MEQ: 7.46 INJECTION, SOLUTION INTRAVENOUS at 07:10

## 2022-10-24 RX ADMIN — OMEGA-3-ACID ETHYL ESTERS 1 G: 1 CAPSULE, LIQUID FILLED ORAL at 12:10

## 2022-10-24 RX ADMIN — METOPROLOL SUCCINATE 100 MG: 100 TABLET, EXTENDED RELEASE ORAL at 08:10

## 2022-10-24 RX ADMIN — POTASSIUM CHLORIDE 10 MEQ: 7.46 INJECTION, SOLUTION INTRAVENOUS at 04:10

## 2022-10-24 RX ADMIN — POTASSIUM CHLORIDE 10 MEQ: 7.46 INJECTION, SOLUTION INTRAVENOUS at 03:10

## 2022-10-24 RX ADMIN — AZITHROMYCIN 500 MG: 500 INJECTION, POWDER, LYOPHILIZED, FOR SOLUTION INTRAVENOUS at 06:10

## 2022-10-24 RX ADMIN — INSULIN ASPART 2 UNITS: 100 INJECTION, SOLUTION INTRAVENOUS; SUBCUTANEOUS at 05:10

## 2022-10-24 RX ADMIN — HYDRALAZINE HYDROCHLORIDE 100 MG: 50 TABLET ORAL at 03:10

## 2022-10-24 RX ADMIN — ALUMINUM HYDROXIDE, MAGNESIUM HYDROXIDE, AND DIMETHICONE 30 ML: 200; 20; 200 SUSPENSION ORAL at 12:10

## 2022-10-24 RX ADMIN — POTASSIUM CHLORIDE 10 MEQ: 7.46 INJECTION, SOLUTION INTRAVENOUS at 06:10

## 2022-10-24 RX ADMIN — SUCRALFATE 1 G: 1 SUSPENSION ORAL at 05:10

## 2022-10-24 NOTE — PT/OT/SLP DISCHARGE
Occupational Therapy Discharge Summary    Kristin Goodman  MRN: [id number]   Principal Problem: Sepsis due to pneumonia      Patient Discharged from acute Occupational Therapy on 10/24/22.  Please refer to prior OT note dated 10/21/22 for functional status.    Assessment:      Patient appropriate for care in another setting.    Objective:     GOALS:   Multidisciplinary Problems       Occupational Therapy Goals          Problem: Occupational Therapy    Goal Priority Disciplines Outcome Interventions   Occupational Therapy Goal     OT, PT/OT Ongoing, Progressing    Description: Goals to be met by: 11/15     Patient will increase functional independence with ADLs by performing:    UE Dressing with Modified [place].  LE Dressing with Modified [place].  Grooming while standing with Modified [place].  Toileting from toilet with Modified [place] for hygiene and clothing management.   Supine to sit with Modified [place].  Step transfer with Modified [place]  Toilet transfer to toilet with Modified [place].                         Reasons for Discontinuation of Therapy Services  Transfer to alternate level of care.      Plan:     Patient Discharged to:  MICU secondary to oxygen needs . OT requires new orders after t/f to ICU. Current orders discontinued.    10/24/2022

## 2022-10-24 NOTE — ASSESSMENT & PLAN NOTE
Elevated CPK    Patient w/o CKD presenting with WILFREDO with rapidly increasing sCr (baseline sCr 1.0-1.2). New onset proteinuria / hematuria in last 30 days. .  DDx: ATN vs GN.     Serum creatinine: 3.4 mg/dL (H) 10/23/22 1822  Estimated creatinine clearance: 12.2 mL/min (A)    -- F/U serologies per Nephro (MITUL, ANCA, anti-GBM, IgA, C3, C4, HCV, HBV, cryo, RF)  -- Plan for renal bx per nephro when stable  -- IV Solumedrol 500 - 750 mg qd  -- Trend sCr  -- Strict I&Os  -- Avoid nephrotoxic agents  -- Renally adjust medications

## 2022-10-24 NOTE — ASSESSMENT & PLAN NOTE
Patient is a 76 yo F with chronic diastolic heart failure, HTN, OA, who is admitted for respiratory failure. Rheumatology is consulted due to concern for vasculitis.    Patient presented with cough and hemoptysis since 9/24/22. She was admitted due to dyspnea and hypoxia on 10/17. She has had Hb drop to 6 this admission requiring blood transfusions. GI defers invasive workup for now because she is not stable enough. She has had increasing creatinine from baseline of 1 to 3.0 on 10/23/22. Nephrology concerned for ATN nephritic picture in urine sediment.    Reviewed her chest imaging which shows progressive disease from 10/14 until now which can be concerning for DAH. This might also explain the Hb drop. No bronch planned for now due to her tenuous respiratory status.    CBC: 17 WBCs, Hb 14 now (s/p transfusion), plt 495  CMP: creatinine 3.5, GFR 13. AST//164  UA: 1+ blood, 88 RBCs, 18 WBCs  UPCR 1.54  Complements normal  RF and CCP negative    Pending: ANCAs, MPO, PR3, MITUL, cryoglobulins    She has received IV Solumedrol 1000 mg total with plans for another 1000 mg tomorrow and Wednesday.      Recommendations:  1. Will check pre-DMARD labs. Follow up pending labs as above.  2. Kidney biopsy planned for tomorrow.  3. Agree with steroid pulse followed by PO prednisone 60 mg daily.  4. Please start Bactrim-DS three times weekly or atovaquone 1500 mg daily while on high dose steroids.  5. Continue Protonix daily while on high dose steroids.      Discussed with attending physician, Dr. Palacio. Attending attestation to follow.    Layne Aguirre MD  Rheumatology Fellow  PGY-5

## 2022-10-24 NOTE — HPI
"Patient is a 78 yo F with chronic diastolic heart failure, HTN, OA, who is admitted for respiratory failure. Rheumatology is consulted due to concern for vasculitis. Patient initially presented to the ED on 9/24/22 complaining of a week of cough followed by an episode of hemoptysis on 9/24 prompting the ED visit. They did a CTA which showed multifocal PNA. She was sent home with Levaquin. She followed up with PCP on 10/4/22 and was feeling better. Then she presented to the ED again on 10/14 and on 10/17 complaining of dyspnea. She had fevers in the few days leading up to admission of 102. She endorsed weakness, productive cough, dyspnea, and loss of appetite. Pt initally at 88% O2 saturation and improved to 98% on 2L NC. She was admitted for IV antibiotics. CT chest with contrast on 10/17 showed evidence of interval progression of bilateral perihilar dense consolidation with peripheral patchy areas of ground-glass opacification nonspecific as to the etiology.  Bilateral pneumonia as well as inflammatory etiologies considered.  Cardiogenic pulmonary edema considered less likely given the pattern.    On 10/19/22 she started having melena. Hb dropped to 6. She got 2 units pRBCs. GI was consulted. They noted "Colonoscopy in 2020 showed internal hemorrhoids and mild sigmoid diverticulosis. EGD in 2012 showed gastritis, biopsies positive for H. Pylori." "We considered doing EGD now, but from a pulmonary standpoint I do not think she is stable enough with the current pneumonia, therefore would recommend that we just follow the patient, treat with a PPI in case there is any peptic ulcer disease."    On 10/21/22 ENT was consulted due to left sided otalgia the day prior which resolved. She also was complaining of cervical adenopathy and sore throat. They did not recommend surgical intervention and felt that adenopathy was likely reactive.     On 10/23/22 ID was consulted. They recommended checking respiratory infection panel " "and fungal markers.    On 10/23/22 Nephrology was consulted due to worsening creatinine. They noted, "Patient is a noted to have baseline creatinine of 1 as recent as 8/2022 but progressive worsening of creatinine in early October.  On admission patient with creatinine of 1.6 (10/17) with subsequent progression and creatinine of 3.0 at time of consultation (10/23).  Nephrology consulted for acute kidney injury." "Patient with ATN nephritic picture in urine sediment, prot/crea ratio pending. Had hematuria in recent past but in context with positive urine cultures. Now definitely more dysmorphic red cells that wbcs in the urine. 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    On 10/23/22, she was transferred to ICU due to desaturations and respiratory distress. Pulmonologist noted that her respiratory status is too tenuous for bronchoscopy. She was stabilized with non-invasive ventilation and nasal cannula oxygen.  "

## 2022-10-24 NOTE — NURSING
2015  Pt desatted to 80s on 4L O2 Venti Mask. Pt's O2 increased to 7L. HOB elevated. Now satting > 90%.    2100  Pt satting in the 80s on 7L O2. /77. RR 39. Pt tachypneic and SOB. Rapid Rns at bedside. O2 increased to max of 15L. Pt remains AAOx4. Pt denies dizziness or lightheadedness. Respiratory at bedside. Pt placed on BiPAP. Sats now %.    2215   Dr. Fitzpatrick at bedside. STAT CXR, ABG, and labs obtained per orders. Dr. Fitzpatrick at bedside. Lasix administered and Curry was placed. Pt appears lethargic and ill. Pt remained AAOx4. Daughter updated on pt's status. Pt satting >95%. RR 30s.      2230  Critical Care team at bedside assessing the pt.

## 2022-10-24 NOTE — CONSULTS
Patient seen and evaluated by critical care medicine. To be admitted to ICU for further management. Full H&P to follow.     RAVEN Montano, [facility]  Pulmonary Critical Care Medicine   10/23/2022

## 2022-10-24 NOTE — ASSESSMENT & PLAN NOTE
Patient with baseline creatinine of 1 as recent as August 2022 with progressive worsening beginning in early October 1.3 (10/13)->1.6 (10/17)->3.0 (10/23) despite IV fluids.  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  Patient noted to have new onset proteinuria and hematuria over the last 1 month.  Additionally, would consider ATN in the setting of suspected sepsis from multifocal pneumonia.     Concerns for glomerulonephritis given patient's current clinical picture. Initial urine microscopy demonstrating muddy brown casts with likely acanthocytes noted as well.  Patient receiving IV solumedrol x3 doses. Will likely pursue kidney bx today or tomorrow.     Recommendations:  - will plan to re-assess urine sediment today  - order UPCR  - serologies ordered (MITUL, ANCA, anti-GBM, IgA, C3, C4, HCV, HBV, cryo, RF) pending final results, negative thus far  - strict intake and output  - renally dose medications and avoid nephrotoxins when possible

## 2022-10-24 NOTE — CONSULTS
Case reviewed with IR staff. Tentatively schedule renal biopsy tomorrow (10/25) with anesthesia given patient's tenuous respiratory status. Please place patient NPO at midnight and continue to hold anticoagulation.    Full H&P to follow.    Boris Hernandez MD PGY-2  Department of Radiology  [facility]

## 2022-10-24 NOTE — RESPIRATORY THERAPY
RAPID RESPONSE RESPIRATORY THERAPY NOTE             Code Status: Full Code   : 1945  Bed: 70067/01454 A:   MRN: [id number]  Time page Received: 1747  Time Rapid Response RT at Bedside: 1750  Time Rapid Response RT left Bedside: 1900    SITUATION    Evaluated patient for: AMS low SpO2    BACKGROUND    Why is the patient in the hospital?: Sepsis due to pneumonia    Patient has a past medical history of Acute blood loss anemia, Allergy, Back pain, Chronic diastolic heart failure, Chronic diastolic heart failure, Colon polyp, Disorder of kidney and ureter, H/O Bell's palsy, Helicobacter pylori (H. pylori), HTN (hypertension), Hypothyroid, OA (osteoarthritis), DONAVAN (obstructive sleep apnea), Pneumonia due to other staphylococcus, Pulmonary HTN, Sepsis due to pneumonia, Trouble in sleeping, and Urinary incontinence.    24 Hours Vitals Range:  Temp:  [97.8 °F (36.6 °C)-98.7 °F (37.1 °C)]   Pulse:  [67-97]   Resp:  [18-36]   BP: (131-197)/(60-86)   SpO2:  [93 %-98 %]     Labs:    Recent Labs     10/21/22  0845 10/22/22  0053 10/23/22  0815   * 129*  --    K 3.2* 3.6  --    CL 97 101  --    CO2 16* 14*  --    CREATININE 2.7* 2.8* 3.0*   * 117*  --         Recent Labs     10/23/22  1804   PH 7.392   PCO2 29.4*   PO2 56*   HCO3 17.8*   POCSATURATED 89*   BE -7       ASSESSMENT/INTERVENTIONS  Pt in bed coughing and in discomfort    Last Vitals: Temp: 97.8 °F (36.6 °C) (10/23 1550)  Pulse: 68 (10/23 1550)  Resp: 20 (10/23 1550)  BP: 148/67 (10/23 1550)  SpO2: 93 % (10/23 1550)  Level of Consciousness: Level of Consciousness (AVPU): alert  Respiratory Effort: Respiratory Effort: Unlabored  Expansion/Accessory Muscle Usage: Expansion/Accessory Muscles/Retractions: expansion symmetric  All Lung Field Breath Sounds: All Lung Fields Breath Sounds: crackles  O2 Device/Concentration: 5L/28%  NIPPV: No Surgical airway: no Vent: No  ETCO2 monitored:    Ambu at bedside: Ambu bag with the patient?: Yes, Adult Ambu    Active  Orders   Respiratory Care    Incentive spirometry     Frequency: Q4H     Number of Occurrences: Until Specified    Inhalation Treatment Q4H PRN     Frequency: Q4H PRN     Number of Occurrences: Until Specified    Oxygen PRN     Frequency: PRN     Number of Occurrences: Until Specified     Order Questions:      Device type: Low flow      Device: Nasal Cannula (1- 5 Liters)      LPM: 1-5      Titrate O2 per Oxygen Titration Protocol: Yes      To maintain SpO2 goal of: >= 90%      Notify MD of: Inability to achieve desired SpO2; Sudden change in patient status and requires 20% increase in FiO2; Patient requires >60% FiO2    POCT ARTERIAL BLOOD GAS Blood Gas     Frequency: Once     Number of Occurrences: 1 Occurrences     Order Comments: Notify Physician if: see parameters below.       Order Questions:      Component: Blood Gas       RECOMMENDATIONS  ?  We recommend: [employer] [facility]. unit     ESCALATION        FOLLOW-UP    Disposition: Remain in room 82833.    Please call back the Rapid Response RT, Zachery Augustine, YOVANY at x [phone] for any questions or concerns.

## 2022-10-24 NOTE — NURSING TRANSFER
Nursing Transfer Note      10/23/2022     Reason patient is being transferred: Increased O2 demand d/t pneumonia; continuous BiPAP    Transfer To: MICU Rm 6097    Transfer via stretcher    Transfer with cardiac monitoring, BiPAP    Transported by RN, PCT, Respiratory therapist    Medicines sent: n/a    Any special needs or follow-up needed: reassess O2    Chart send with patient: Yes    Notified: daughter    Patient reassessed at: 10/23/22, 2302     Upon arrival to floor: Report given to CRISTINE Osorio

## 2022-10-24 NOTE — CARE UPDATE
RAPID RESPONSE NURSE FOLLOW-UP NOTE       Followed up with patient for proactive rounding.  Patient to be transferred to ICU for higher level of care. Reviewed plan of care with bedside RNTyson .   Eola will sign off care to [facility] at this time.  Please call Rapid Response RN, Flaquita Arguelles RN with any questions or concerns at [phone].
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
## 2022-10-24 NOTE — PT/OT/SLP DISCHARGE
Physical Therapy Discharge Summary    Name: Kristin Goodman  MRN: [id number]   Principal Problem: Sepsis due to pneumonia     Patient Discharged from acute Physical Therapy on 10/24/2022.  Please refer to prior PT noted date on 10/22/2022 for functional status.     Assessment:     Patient transferred to lower level of care secondary to oxygen needs . PT requires new orders after t/f to ICU. Current orders discontinued.    Objective:     GOALS:   Multidisciplinary Problems       Physical Therapy Goals          Problem: Physical Therapy    Goal Priority Disciplines Outcome Goal Variances Interventions   Physical Therapy Goal     PT, PT/OT Ongoing, Progressing     Description: Goals to be met by: 2022     Patient will increase functional independence with mobility by performin. Supine to sit with Sunset  2. Sit to supine with Sunset  3. Sit to stand transfer with Supervision  4. Bed to chair transfer with Supervision using LRAD  5. Gait  x 50 feet with Supervision using LRAD.                          Reasons for Discontinuation of Therapy Services  Transfer to alternate level of care.      Plan:     Patient Discharged to:  ICU. PT to await new orders for reassessment .      10/24/2022

## 2022-10-24 NOTE — PROGRESS NOTES
[facility] - Cardiac Medical ICU  Nephrology  Progress Note    Patient Name: Kristin Goodman  MRN: [id number]  Admission Date: 10/17/2022  Hospital Length of Stay: 7 days  Attending Provider: Fox Holbrook MD   Primary Care Physician: Lori Hernandez MD  Principal Problem:Sepsis due to pneumonia    Subjective:     HPI: Kristin Goodman is a 77 year old female with hypertension, hypothyroidism, HFpEF who presents for cough, shortness of breath, and weakness.  Patient initially presented to ER on 09/24 with hemoptysis and imaging concerning for multilobar pneumonia at which time she was discharged on a 10 day course of levofloxacin.  Patient initially had some improvement but began having worsening symptoms on 10/14.  Patient describes multiple fevers and progressive shortness of breath.  She continues to have intermittent hemoptysis.  Patient with worsening leukocytosis and limited clinical improvement despite broad-spectrum antibiotics.  She remains on cefepime.  Patient is a noted to have baseline creatinine of 1 as recent as 8/2022 but progressive worsening of creatinine in early October.  On admission patient with creatinine of 1.6 (10/17) with subsequent progression and creatinine of 3.0 at time of consultation (10/23).  Nephrology consulted for acute kidney injury.      Interval History: Patient stepped up to MICU overnight due to worsening respiratory distress and hypoxia requiring BIPAP. Once in MICU, patient able to be weaned down, currently on LFNC with humidifier during examination. Patient reports feeling improvement in breathing. Denies any other acute complaints.     Review of Systems   Constitutional:  Positive for malaise/fatigue. Negative for chills and fever.   Respiratory:  Negative for cough and shortness of breath.    Cardiovascular:  Negative for chest pain.   Gastrointestinal:  Negative for abdominal pain and nausea.   Genitourinary:  Negative for dysuria and urgency.   Musculoskeletal:   Negative for joint pain and myalgias.   Neurological:  Negative for weakness.       Review of patient's allergies indicates:   Allergen Reactions    Ampicillin     Penicillins      Other reaction(s): Hives    Sulfa (sulfonamide antibiotics) Rash and Hives     Current Facility-Administered Medications   Medication Frequency    0.9%  NaCl infusion (for blood administration) Q24H PRN    0.9%  NaCl infusion (for blood administration) Q24H PRN    acetaminophen tablet 1,000 mg Q8H PRN    acetaminophen tablet 650 mg Q4H PRN    albuterol-ipratropium 2.5 mg-0.5 mg/3 mL nebulizer solution 3 mL Q4H PRN    aluminum-magnesium hydroxide-simethicone 200-200-20 mg/5 mL suspension 30 mL QID PRN    aluminum-magnesium hydroxide-simethicone 200-200-20 mg/5 mL suspension 30 mL QID (AC & HS)    amLODIPine tablet 10 mg Daily    azithromycin 500 mg in dextrose 5 % 250 mL IVPB (ready to mix system) Q24H    bisacodyL suppository 10 mg Daily PRN    cefepime in dextrose 5 % 1 gram/50 mL IVPB 1 g Q24H    cloNIDine tablet 0.2 mg Q4H PRN    dextrose 10% bolus 125 mL PRN    dextrose 10% bolus 250 mL PRN    glucagon (human recombinant) injection 1 mg PRN    glucose chewable tablet 16 g PRN    glucose chewable tablet 24 g PRN    hydrALAZINE tablet 100 mg TID    hydrALAZINE tablet 25 mg Q8H PRN    levothyroxine tablet 25 mcg Daily    melatonin tablet 6 mg Nightly PRN    methocarbamoL tablet 500 mg TID PRN    [START ON 10/25/2022] methylPREDNISolone sodium succinate (SOLU-MEDROL) 1,000 mg in dextrose 5 % 100 mL IVPB Daily    metoprolol succinate (TOPROL-XL) 24 hr tablet 100 mg Daily    naloxone 0.4 mg/mL injection 0.02 mg PRN    omega-3 acid ethyl esters capsule 1 g Daily    ondansetron disintegrating tablet 8 mg Q8H PRN    pantoprazole injection 40 mg BID    prochlorperazine injection Soln 5 mg Q6H PRN    simethicone chewable tablet 80 mg QID PRN    sodium chloride 0.9% flush 5 mL PRN    sucralfate 100 mg/mL  suspension 1 g Q6H     Facility-Administered Medications Ordered in Other Encounters   Medication Frequency    celecoxib capsule 400 mg Once    fentaNYL 50 mcg/mL injection  mcg PRN    LIDOcaine (PF) 10 mg/ml (1%) injection 10 mg Once PRN    LIDOcaine (PF) 10 mg/ml (1%) injection 10 mg Once    midazolam (VERSED) 1 mg/mL injection 0.5-4 mg PRN    ropivacaine 0.2% Nimbus PainPRO Pump infusion 500 ML Continuous       Objective:     Vital Signs (Most Recent):  Temp: 97.2 °F (36.2 °C) (10/24/22 0700)  Pulse: 75 (10/24/22 0821)  Resp: (!) 34 (10/24/22 0821)  BP: 134/63 (10/24/22 0814)  SpO2: 96 % (10/24/22 0821)  O2 Device (Oxygen Therapy): High Flow nasal Cannula (Bubble) (10/24/22 0821)   Vital Signs (24h Range):  Temp:  [97.2 °F (36.2 °C)-98.7 °F (37.1 °C)] 97.2 °F (36.2 °C)  Pulse:  [67-86] 75  Resp:  [20-39] 34  SpO2:  [89 %-100 %] 96 %  BP: (131-190)/(60-85) 134/63     Weight: 63.5 kg (139 lb 15.9 oz) (10/17/22 1633)  Body mass index is 26.45 kg/m².  Body surface area is 1.65 meters squared.    I/O last 3 completed shifts:  In: 476.6 [P.O.:200; IV Piggyback:276.6]  Out: 925 [Urine:925]    Physical Exam  Vitals and nursing note reviewed.   Constitutional:       General: She is not in acute distress.     Appearance: She is well-developed. She is ill-appearing.   HENT:      Head: Normocephalic and atraumatic.   Cardiovascular:      Rate and Rhythm: Normal rate and regular rhythm.      Heart sounds: Normal heart sounds.   Pulmonary:      Effort: Pulmonary effort is normal. No respiratory distress.      Breath sounds: Rales present. No wheezing.   Abdominal:      General: Bowel sounds are normal. There is no distension.      Palpations: Abdomen is soft.      Tenderness: There is no abdominal tenderness.   Musculoskeletal:         General: No tenderness. Normal range of motion.      Cervical back: Normal range of motion and neck supple.      Right lower leg: No edema.      Left lower leg: No edema.    Lymphadenopathy:      Cervical: No cervical adenopathy.   Skin:     General: Skin is warm and dry.      Capillary Refill: Capillary refill takes less than 2 seconds.      Findings: No rash.   Neurological:      General: No focal deficit present.      Mental Status: She is alert and oriented to person, place, and time.   Psychiatric:         Mood and Affect: Mood normal.         Behavior: Behavior normal.       Significant Labs:  CBC:   Recent Labs   Lab 10/24/22  0353   WBC 17.60*   RBC 5.26   HGB 14.6   HCT 43.2   *   MCV 82   MCH 27.8   MCHC 33.8     CMP:   Recent Labs   Lab 10/24/22  0353   *   CALCIUM 9.2   ALBUMIN 1.8*   PROT 7.7      K 3.6   CO2 14*      BUN 96*   CREATININE 3.5*   ALKPHOS 146*   *   *   BILITOT 3.1*     Recent Labs   Lab 10/23/22  2333   COLORU Yellow   SPECGRAV 1.010   PHUR 5.0   PROTEINUA Trace*   BACTERIA Rare   NITRITE Negative   LEUKOCYTESUR Negative   HYALINECASTS 2*     All labs within the past 24 hours have been reviewed.     Significant Imaging:  Labs: Reviewed      Assessment/Plan:     Hyponatremia  Patient with worsening hyponatremia since admission.  Baseline sodium 140, on admission 135 worsening to 126 but subsequently up trending to 129 at the time of consultation (10/23).     - improved to 136 on 10/24  - daily BMP    Acute renal failure superimposed on stage 3 chronic kidney disease  Patient with baseline creatinine of 1 as recent as August 2022 with progressive worsening beginning in early October 1.3 (10/13)->1.6 (10/17)->3.0 (10/23) despite IV fluids.  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  Patient noted to have new onset proteinuria and hematuria over the last 1 month.  Additionally, would consider ATN in the setting of suspected sepsis from multifocal pneumonia.     Concerns for glomerulonephritis given patient's current clinical picture. Initial urine  microscopy demonstrating muddy brown casts with sparse acanthocytes noted as well.  Patient receiving IV solumedrol x3 doses. Will likely pursue kidney bx today or tomorrow.     Recommendations:  - will plan to re-assess urine sediment today  - order UPCR  - serologies ordered (MITUL, ANCA, anti-GBM, IgA, C3, C4, HCV, HBV, cryo, RF) pending final results, negative thus far  - strict intake and output  - renally dose medications and avoid nephrotoxins when possible        Thank you for your consult. I will follow-up with patient. Please contact us if you have any additional questions.    Bipin Frias MD  Nephrology  [facility] - Cardiac Medical ICU

## 2022-10-24 NOTE — ASSESSMENT & PLAN NOTE
Pulmonary Hypertension    TTE (10/2022) EF 65%, G1DD  Home meds: Lisinopril, Toprol  Dry weight: N/A    -- Diuretic plan per Acute Hypoxemic Respiratory Failure A/P  -- Daily weights (standing if tolerated)  -- Strict I/Os; goal net negative 1.5 - 2 L / day  -- Cardiac diet w/ 2 g Na restriction

## 2022-10-24 NOTE — PLAN OF CARE
CMICU DAILY GOALS       A: Awake    RASS: Goal -    Actual -     Restraint necessity:    B: Breathe   SBT: not intubated  C: Coordinate A & B, analgesics/sedatives   Pain: managed    SAT: Not intubated  D: Delirium   CAM-ICU: Overall CAM-ICU: Negative  E: Early(intubated/ Progressive (non-intubated) Mobility   MOVE Screen: Pass   Activity: Activity Management: Arm raise - L1  FAS: Feeding/Nutrition   Diet order: Diet/Nutrition Received: NPO,    T: Thrombus   DVT prophylaxis: VTE Required Core Measure: Per order contraindicated for SCDs/Anticoagulants  H: HOB Elevation   Head of Bed (HOB) Positioning: HOB elevated  U: Ulcer Prophylaxis   GI: yes  G: Glucose control   managed Glycemic Management: oral hydration promoted  S: Skin   Bathing/Skin Care: bath, complete, dressed/undressed, linen changed  Device Skin Pressure Protection: absorbent pad utilized/changed, pressure points protected  Pressure Reduction Devices: foam padding utilized, heel offloading device utilized  Pressure Reduction Techniques: heels elevated off bed, positioned off wounds, weight shift assistance provided  Skin Protection: adhesive use limited  B: Bowel Function   no issues   I: Indwelling Catheters   Curry necessity:      Urethral Catheter 10/23/22 2230 16 Fr.-Reason for Continuing Urinary Catheterization: Critically ill in ICU and requiring hourly monitoring of intake/output   CVC necessity: No  D: De-escalation Antibiotics   No    Family/Goals of care/Code Status   Code Status: Full Code    24H Vital Sign Range  Temp:  [97.2 °F (36.2 °C)-98.7 °F (37.1 °C)]   Pulse:  [67-86]   Resp:  [20-39]   BP: (134-173)/(62-79)   SpO2:  [89 %-100 %]      Shift Events   No acute events throughout shift. Pt still on 6 L NC, maintaining O2 sats >90%. Family at the bedside throughout the day. To have kidney biopsy done tomorrow. Pt currently resting comfortably in NAD.    VS and assessment per flow sheet, patient progressing towards goals as tolerated, plan  of care reviewed with  pt Kristin Goodman and family , all concerns addressed, will continue to monitor.    Gautam Vallejo
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
## 2022-10-24 NOTE — PROGRESS NOTES
The pt is a 77 yof with PMH of HFpEF, htn, possible pulmonary hypertension (never had a RHC), & DONAVAN who was admitted from the ED on 10/17 with hemoptysis. This was not her first time seeking care for this problem. She presented on 9/24 & 10/14 with the same. She has not been seen by pulmonology until now. She is transferred to the ICU today with acute hypoxemic respiratory failure. She continues to cough up blood with most recent episode yesterday afternoon. She has taken two full courses of abx. In addition to this, she has WILFREDO with presentation suggestive of glomerulonephritis. She is negative for flu & covid. Her sputum culture is negative.    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 The pt's respiratory status is too tenuous for bronchoscopy right now. Given the clinical picture, the probability of DAH is high enough that I do not think it is necessary to intubate the pt to perform bronchoscopy & confirm. Our critical care service initiated high-dose steroids immediately. We will prescribe 1000mg solumedrol IV daily x3. We will consult rheumatology; she will likely need cyclophosphamide or rituximab though if we observe clinical improvement this might be delayed until confirmatory autoimmune serology is resulted.  2) Acute hypoxemic respiratory failure. Responded well to non-invasive ventilation overnight. Will try back on NC with humidifier this morning.  3) WILFREDO. See discussion above.    Critical Care Time: 50 minutes  Critical care was time spent personally by me on the following activities: evaluating this patient's organ dysfunction, development of treatment plan, discussing treatment plan with patient or surrogate and bedside caregivers, discussions with consultants, evaluation of patient's response to treatment, examination of patient, ordering and performing treatments and interventions, ordering and review of laboratory studies, ordering and review of radiographic  studies, re-evaluation of patient's condition. This critical care time did not overlap with that of any other provider or involve time for any procedures.

## 2022-10-24 NOTE — ASSESSMENT & PLAN NOTE
Multifocal Pneumonia  Hemoptysis      76 yo PMHx HTN, hypothyroid, HFpEF, OA admitted for fevers and respiratory symptoms, treated while on Hospital Medicine for multifocal PNA. Course complicated by GIB bleed (no EGD yet d/t PNA), hyponatremia, ATN. MICU consulted for rapidly increasing oxygen requirement (4L NC to BiPAP 15/10 50%), respiratory distress/work of breathing, somnolence. Nephrology consulted for ATN, concerns for possible nephritic disease as  behind ATN d/t acanthrocytes. Reports of scant hemoptysis by nursing staff 10/21, 10/22.    Imaging: CXR: Diffuse bilateral airspace infiltrates w/ c/f alveolar fillings; CT chest wc 10/17 with bl perihilar dense consolidations w/ peripheral patcy areas of GGO, BL PNA, less c/f cardiogenic pulmonary edema.  Labs: WBC uptrending 28.97, Hgb lowest 6.0 (s/p 2 u PRBC), continues to downtrend approx 1 point / day. sCr uptrending, (baseline 1.0-1.2). .      Recent Labs     10/23/22  2228   PH 7.378   PCO2 29.7*   PO2 101*   HCO3 17.5*   POCSATURATED 98   BE -8     Etiology: Given concerns for GN per nephrology, recent hemoptysis, concerns for DAH. Concerns for possible PE as patient was not on thrombotic ppx s/o GIB. Incomplete I/Os charted, though reports of low UOP also renders pulmonary congestion edema as a possibility. Less c/f acute progression of multifocal PNA as adequate ABx coverage and no new fevers recently.    -- US DVT BLE stat  -- Consideration for urgent BAL +/- repeat chest imaging (last done 10/17)  -- Solumedrol IV to address possible GN  -- Rheumatology consult  -- Continue cefepime for treatment of multifocal PNA; ID following  -- Diuretic trial PRN  -- Wean continuous BIPAP PRN  -- Neuro checks  -- NPO while on continuous NIPPV  -- Blood gas PRN

## 2022-10-24 NOTE — SUBJECTIVE & OBJECTIVE
Interval History: Patient stepped up to MICU overnight due to worsening respiratory distress and hypoxia requiring BIPAP. Once in MICU, patient able to be weaned down, currently on LFNC with humidifier during examination. Patient reports feeling improvement in breathing. Denies any other acute complaints.     Review of Systems   Constitutional:  Positive for malaise/fatigue. Negative for chills and fever.   Respiratory:  Negative for cough and shortness of breath.    Cardiovascular:  Negative for chest pain.   Gastrointestinal:  Negative for abdominal pain and nausea.   Genitourinary:  Negative for dysuria and urgency.   Musculoskeletal:  Negative for joint pain and myalgias.   Neurological:  Negative for weakness.       Review of patient's allergies indicates:   Allergen Reactions    Ampicillin     Penicillins      Other reaction(s): Hives    Sulfa (sulfonamide antibiotics) Rash and Hives     Current Facility-Administered Medications   Medication Frequency    0.9%  NaCl infusion (for blood administration) Q24H PRN    0.9%  NaCl infusion (for blood administration) Q24H PRN    acetaminophen tablet 1,000 mg Q8H PRN    acetaminophen tablet 650 mg Q4H PRN    albuterol-ipratropium 2.5 mg-0.5 mg/3 mL nebulizer solution 3 mL Q4H PRN    aluminum-magnesium hydroxide-simethicone 200-200-20 mg/5 mL suspension 30 mL QID PRN    aluminum-magnesium hydroxide-simethicone 200-200-20 mg/5 mL suspension 30 mL QID (AC & HS)    amLODIPine tablet 10 mg Daily    azithromycin 500 mg in dextrose 5 % 250 mL IVPB (ready to mix system) Q24H    bisacodyL suppository 10 mg Daily PRN    cefepime in dextrose 5 % 1 gram/50 mL IVPB 1 g Q24H    cloNIDine tablet 0.2 mg Q4H PRN    dextrose 10% bolus 125 mL PRN    dextrose 10% bolus 250 mL PRN    glucagon (human recombinant) injection 1 mg PRN    glucose chewable tablet 16 g PRN    glucose chewable tablet 24 g PRN    hydrALAZINE tablet 100 mg TID    hydrALAZINE tablet 25 mg Q8H PRN    levothyroxine  tablet 25 mcg Daily    melatonin tablet 6 mg Nightly PRN    methocarbamoL tablet 500 mg TID PRN    [START ON 10/25/2022] methylPREDNISolone sodium succinate (SOLU-MEDROL) 1,000 mg in dextrose 5 % 100 mL IVPB Daily    metoprolol succinate (TOPROL-XL) 24 hr tablet 100 mg Daily    naloxone 0.4 mg/mL injection 0.02 mg PRN    omega-3 acid ethyl esters capsule 1 g Daily    ondansetron disintegrating tablet 8 mg Q8H PRN    pantoprazole injection 40 mg BID    prochlorperazine injection Soln 5 mg Q6H PRN    simethicone chewable tablet 80 mg QID PRN    sodium chloride 0.9% flush 5 mL PRN    sucralfate 100 mg/mL suspension 1 g Q6H     Facility-Administered Medications Ordered in Other Encounters   Medication Frequency    celecoxib capsule 400 mg Once    fentaNYL 50 mcg/mL injection  mcg PRN    LIDOcaine (PF) 10 mg/ml (1%) injection 10 mg Once PRN    LIDOcaine (PF) 10 mg/ml (1%) injection 10 mg Once    midazolam (VERSED) 1 mg/mL injection 0.5-4 mg PRN    ropivacaine 0.2% [facility] PainPRO Pump infusion 500 ML Continuous       Objective:     Vital Signs (Most Recent):  Temp: 97.2 °F (36.2 °C) (10/24/22 0700)  Pulse: 75 (10/24/22 0821)  Resp: (!) 34 (10/24/22 0821)  BP: 134/63 (10/24/22 0814)  SpO2: 96 % (10/24/22 0821)  O2 Device (Oxygen Therapy): High Flow nasal Cannula (Bubble) (10/24/22 0821)   Vital Signs (24h Range):  Temp:  [97.2 °F (36.2 °C)-98.7 °F (37.1 °C)] 97.2 °F (36.2 °C)  Pulse:  [67-86] 75  Resp:  [20-39] 34  SpO2:  [89 %-100 %] 96 %  BP: (131-190)/(60-85) 134/63     Weight: 63.5 kg (139 lb 15.9 oz) (10/17/22 1633)  Body mass index is 26.45 kg/m².  Body surface area is 1.65 meters squared.    I/O last 3 completed shifts:  In: 476.6 [P.O.:200; IV Piggyback:276.6]  Out: 925 [Urine:925]    Physical Exam  Vitals and nursing note reviewed.   Constitutional:       General: She is not in acute distress.     Appearance: She is well-developed. She is ill-appearing.   HENT:      Head: Normocephalic and atraumatic.    Cardiovascular:      Rate and Rhythm: Normal rate and regular rhythm.      Heart sounds: Normal heart sounds.   Pulmonary:      Effort: Pulmonary effort is normal. No respiratory distress.      Breath sounds: Rales present. No wheezing.   Abdominal:      General: Bowel sounds are normal. There is no distension.      Palpations: Abdomen is soft.      Tenderness: There is no abdominal tenderness.   Musculoskeletal:         General: No tenderness. Normal range of motion.      Cervical back: Normal range of motion and neck supple.      Right lower leg: No edema.      Left lower leg: No edema.   Lymphadenopathy:      Cervical: No cervical adenopathy.   Skin:     General: Skin is warm and dry.      Capillary Refill: Capillary refill takes less than 2 seconds.      Findings: No rash.   Neurological:      General: No focal deficit present.      Mental Status: She is alert and oriented to person, place, and time.   Psychiatric:         Mood and Affect: Mood normal.         Behavior: Behavior normal.       Significant Labs:  CBC:   Recent Labs   Lab 10/24/22  0353   WBC 17.60*   RBC 5.26   HGB 14.6   HCT 43.2   *   MCV 82   MCH 27.8   MCHC 33.8     CMP:   Recent Labs   Lab 10/24/22  0353   *   CALCIUM 9.2   ALBUMIN 1.8*   PROT 7.7      K 3.6   CO2 14*      BUN 96*   CREATININE 3.5*   ALKPHOS 146*   *   *   BILITOT 3.1*     Recent Labs   Lab 10/23/22  2333   COLORU Yellow   SPECGRAV 1.010   PHUR 5.0   PROTEINUA Trace*   BACTERIA Rare   NITRITE Negative   LEUKOCYTESUR Negative   HYALINECASTS 2*     All labs within the past 24 hours have been reviewed.     Significant Imaging:  Labs: Reviewed

## 2022-10-24 NOTE — ASSESSMENT & PLAN NOTE
Melena    Per GI, bleed remote, no evidence of acute re-bleed; patient has had normal stool for 10-14 days. No EGD this admission d/t PNA.    -- Continue recommended PO PPI QD  -- Monitor for bleed  -- Trend CBC; transfuse Hgb < 7

## 2022-10-24 NOTE — PLAN OF CARE
CMICU DAILY GOALS       A: Awake    RASS: Goal -    Actual -     Restraint necessity:    B: Breathe   SBT: Not intubated   C: Coordinate A & B, analgesics/sedatives   Pain: managed    SAT: Not intubated  D: Delirium   CAM-ICU: Overall CAM-ICU: Negative  E: Early(intubated/ Progressive (non-intubated) Mobility   MOVE Screen: Pass   Activity: Activity Management: Rolling - L1  FAS: Feeding/Nutrition   Diet order: Diet/Nutrition Received: 2 gram sodium,    T: Thrombus   DVT prophylaxis: VTE Required Core Measure: Pharmacological prophylaxis initiated/maintained  H: HOB Elevation   Head of Bed (HOB) Positioning: HOB at 30 degrees  U: Ulcer Prophylaxis   GI: yes  G: Glucose control   managed    S: Skin   Bathing/Skin Care: bath, complete, dressed/undressed, linen changed  Device Skin Pressure Protection: absorbent pad utilized/changed  Pressure Reduction Devices: specialty bed utilized  Pressure Reduction Techniques: weight shift assistance provided  Skin Protection: adhesive use limited  B: Bowel Function   no issues   I: Indwelling Catheters   Curry necessity:      Urethral Catheter 10/23/22 2230 16 Fr.-Reason for Continuing Urinary Catheterization: Critically ill in ICU and requiring hourly monitoring of intake/output   CVC necessity: No  D: De-escalation Antibiotics   Yes    Family/Goals of care/Code Status   Code Status: Full Code    24H Vital Sign Range  Temp:  [97.8 °F (36.6 °C)-98.7 °F (37.1 °C)]   Pulse:  [67-86]   Resp:  [20-39]   BP: (131-190)/(60-85)   SpO2:  [89 %-100 %]      Shift Events   No acute events throughout shift. Pt upgraded from floor for closer monitoring of respiratory status. Liver US complete. Replacing potassium     VS and assessment per flow sheet, patient progressing towards goals as tolerated, plan of care reviewed with family, all concerns addressed, will continue to monitor.    Jo Hauser

## 2022-10-24 NOTE — ASSESSMENT & PLAN NOTE
Patient with worsening hyponatremia since admission.  Baseline sodium 140, on admission 135 worsening to 126 but subsequently up trending to 129 at the time of consultation (10/23).     - improved to 136 on 10/24  - daily BMP

## 2022-10-24 NOTE — SIGNIFICANT EVENT
Notified this evening by RN that patient had a rapid response.     Around shift change patient with O2 desaturations on low flow nasal cannula placed on Venti-mask 7-15L  then close to 2100 patient with further desaturation/respiratory distress    She has been placed on BIPAP, CXR ordered by RRT team.  CXR appears unchanged,     Review of chart patient with progressive renal failure, possible oliguria and concern for multifocal pneumonia but also possible pulmonary renal syndrome.     Plan  -EKG, BNP, Troponin Stat ordered  -Critical Care consult ordered and will accept pt in transfer to ICU   -ABG obtained while on bipap 50% as noted below.   -Lasix 120mg IV given   -szymanski catheter ordered  -Nephrology recommended - high dose steroids, discussed with critical care - 500mg solumedrol ordered.       Recent Labs     10/23/22  2228   PH 7.378   PCO2 29.7*   PO2 101*   HCO3 17.5*   POCSATURATED 98   BE -8
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
## 2022-10-24 NOTE — CARE UPDATE
RAPID RESPONSE NURSE PROACTIVE ROUNDING NOTE       Time of Visit:     Admit Date: 10/17/2022  LOS: 6  Code Status: Full Code   Date of Visit: 10/23/2022  : 1945  Age: 77 y.o.  Sex: female  Race: Black or   Bed: 07556/01391 A:   MRN: [id number]  Was the patient discharged from an ICU this admission? No   Was the patient discharged from a PACU within last 24 hours? No   Did the patient receive conscious sedation/general anesthesia in last 24 hours? No  Was the patient in the ED within the past 24 hours? No  Was the patient on NIPPV within the past 24 hours? Yes   Attending Physician: Michelle Ocasio MD  Primary Service: [facility]   Time spent at the bedside: 15 -30 min    SITUATION    Notified by previous RRN during handoff.  Reason for alert: increased oxygen demand  Called to evaluate the patient for Respiratory    BACKGROUND     Why is the patient in the hospital?: Sepsis due to pneumonia    Patient has a past medical history of Acute blood loss anemia, Allergy, Back pain, Chronic diastolic heart failure, Chronic diastolic heart failure, Colon polyp, Disorder of kidney and ureter, H/O Bell's palsy, Helicobacter pylori (H. pylori), HTN (hypertension), Hypothyroid, OA (osteoarthritis), DONAVAN (obstructive sleep apnea), Pneumonia due to other staphylococcus, Pulmonary HTN, Sepsis due to pneumonia, Trouble in sleeping, and Urinary incontinence.    Last Vitals:  Temp: 98.7 °F (37.1 °C) (10/23 2228)  Pulse: 86 (10/23 2228)  Resp: 30 (10/23 2228)  BP: 170/79 (10/23 2228)  SpO2: 100 % (10/23 2228)    24 Hours Vitals Range:  Temp:  [97.8 °F (36.6 °C)-98.7 °F (37.1 °C)]   Pulse:  [67-97]   Resp:  [18-39]   BP: (131-197)/(60-86)   SpO2:  [89 %-100 %]     Labs:  Recent Labs     10/21/22  0845 10/22/22  0053 10/23/22  1130   WBC 25.94* 28.37* 28.97*   HGB 10.7* 9.6* 8.6*   HCT 31.9* 28.3* 24.5*   * 457* 507*       Recent Labs     10/21/22  0845 10/22/22  0053 10/23/22  0815 10/23/22  1822   NA  129* 129*  --  135*   K 3.2* 3.6  --  3.3*   CL 97 101  --  106   CO2 16* 14*  --  17*   CREATININE 2.7* 2.8* 3.0* 3.4*   * 117*  --  136*        Recent Labs     10/23/22  1804 10/23/22  2228   PH 7.392 7.378   PCO2 29.4* 29.7*   PO2 56* 101*   HCO3 17.8* 17.5*   POCSATURATED 89* 98   BE -7 -8        ASSESSMENT    Physical Exam  Constitutional:       Appearance: She is ill-appearing.   Cardiovascular:      Rate and Rhythm: Normal rate and regular rhythm.      Pulses: Normal pulses.   Pulmonary:      Effort: Tachypnea, accessory muscle usage and respiratory distress present.      Breath sounds: Rales present.   Skin:     General: Skin is warm and dry.      Capillary Refill: Capillary refill takes less than 2 seconds.   Neurological:      Mental Status: She is lethargic.      GCS: GCS eye subscore is 3. GCS verbal subscore is 5. GCS motor subscore is 6.     HR 83  /77 (110)  RR 39, SpO2 89% on 7L 50% venti mask    INTERVENTIONS    The patient was seen for Respiratory problem. Staff concerns included tachypnea, oxygen saturation < 90% despite supplemental oxygen, increased WOB, and increased oxygen requirements. The following interventions were performed: Bipap and portable chest x-ray.    Increased O2 to 15L 50% venti mask with little to no change in oxygen saturation levels, RR remains high  Placed patient on BiPAP 15/10 50%, patient appears comfortable and is tolerating well, sats up to 96%    RECOMMENDATIONS    Continue to monitor respiratory status closely  Aspiration risk  Strict I&O  Keep BiPAP in place overnight    PROVIDER ESCALATION    Yes/No  no    Orders received and case discussed with NA.    Disposition: Remain in room 25194.    FOLLOW-UP    bedside Tyson COLUNGA  updated on plan of care. Instructed to call the Rapid Response Nurse, Flaquita Arguelles RN at [phone] for additional questions or concerns.

## 2022-10-24 NOTE — HPI
Ms. Goodman is a 77 year old female with PMH notable for HTN, hypothyroidism, HFpEF (65%, TR, elevated PA pressure), and osteoarthritis of the arm who [place] [facility] [place] with progressive shortness of breath, weakness, cough, and hemoptysis. Previously, she presented the ED 9/24 with hemoptysis and CT and CXR at that time concerning for PNA which she was given a 10 day course of abx and albuterol. She followed up with her PCP and had improvement of symptoms. She then presented to the ED 10/14 with no interventions. At the time of this presentation, 10/17, in addition to above symptoms, she reports fever up to 102.4 and increasing amounts of hemoptysis. CT chest with extensive opacification at admission. She was started on broad spectrum IV abx coverage with Vanc / Zosyn and an infectious work up was sent. ID was later consulted during the hospitalization.     Her hospitalization has been complicated by GIB which required 2 u PRBC transfusion and PPI therapy. No EGD with GI. Additionally had bilateral cervical adenopathy which was evaluated by ENT and felt to be reactive in nature. She has had worsening renal function and nephrology was consulted with plans for a kidney biopsy. She has been trailed with IV diuresis. Nephrology concerned GN. Rheumatologic work up in process.     Critical care consulted on the evening of 10/23 for worsening respiratory failure now requiring NIPPV. Earlier today on 4L nasal cannula then transitioned to venti mask and later BiPap. Worsening opacification on CXR and AMS. Patient upgraded to MICU for higher level of care and closer monitoring in setting of her tenuous respiratory status.

## 2022-10-25 LAB
ALBUMIN SERPL BCP-MCNC: 1.8 G/DL (ref 3.5–5.2)
ALBUMIN SERPL BCP-MCNC: 1.8 G/DL (ref 3.5–5.2)
ALP SERPL-CCNC: 160 U/L (ref 55–135)
ALP SERPL-CCNC: 180 U/L (ref 55–135)
ALT SERPL W/O P-5'-P-CCNC: 113 U/L (ref 10–44)
ALT SERPL W/O P-5'-P-CCNC: 137 U/L (ref 10–44)
ANION GAP SERPL CALC-SCNC: 12 MMOL/L (ref 8–16)
ANION GAP SERPL CALC-SCNC: 16 MMOL/L (ref 8–16)
ANISOCYTOSIS BLD QL SMEAR: SLIGHT
AST SERPL-CCNC: 166 U/L (ref 10–40)
AST SERPL-CCNC: 97 U/L (ref 10–40)
BASOPHILS # BLD AUTO: ABNORMAL K/UL (ref 0–0.2)
BASOPHILS NFR BLD: 0 % (ref 0–1.9)
BASOPHILS NFR BLD: 0 % (ref 0–1.9)
BILIRUB SERPL-MCNC: 2 MG/DL (ref 0.1–1)
BILIRUB SERPL-MCNC: 2.1 MG/DL (ref 0.1–1)
BM IGG SER-ACNC: <0.2 U
BUN SERPL-MCNC: 123 MG/DL (ref 8–23)
BUN SERPL-MCNC: 140 MG/DL (ref 8–23)
CALCIUM SERPL-MCNC: 8.5 MG/DL (ref 8.7–10.5)
CALCIUM SERPL-MCNC: 8.8 MG/DL (ref 8.7–10.5)
CHLORIDE SERPL-SCNC: 101 MMOL/L (ref 95–110)
CHLORIDE SERPL-SCNC: 103 MMOL/L (ref 95–110)
CO2 SERPL-SCNC: 15 MMOL/L (ref 23–29)
CO2 SERPL-SCNC: 16 MMOL/L (ref 23–29)
CREAT SERPL-MCNC: 4.4 MG/DL (ref 0.5–1.4)
CREAT SERPL-MCNC: 5.1 MG/DL (ref 0.5–1.4)
DIFFERENTIAL METHOD: ABNORMAL
DIFFERENTIAL METHOD: ABNORMAL
EOSINOPHIL # BLD AUTO: ABNORMAL K/UL (ref 0–0.5)
EOSINOPHIL NFR BLD: 0 % (ref 0–8)
EOSINOPHIL NFR BLD: 0 % (ref 0–8)
ERYTHROCYTE [DISTWIDTH] IN BLOOD BY AUTOMATED COUNT: 13.8 % (ref 11.5–14.5)
ERYTHROCYTE [DISTWIDTH] IN BLOOD BY AUTOMATED COUNT: 14.2 % (ref 11.5–14.5)
EST. GFR  (NO RACE VARIABLE): 8.3 ML/MIN/1.73 M^2
EST. GFR  (NO RACE VARIABLE): 9.9 ML/MIN/1.73 M^2
GLUCOSE SERPL-MCNC: 166 MG/DL (ref 70–110)
GLUCOSE SERPL-MCNC: 187 MG/DL (ref 70–110)
HAV IGG SER QL IA: NORMAL
HBV SURFACE AB SER-ACNC: 186.31 MIU/ML
HBV SURFACE AB SER-ACNC: REACTIVE M[IU]/ML
HCT VFR BLD AUTO: 23.1 % (ref 37–48.5)
HCT VFR BLD AUTO: 25 % (ref 37–48.5)
HGB BLD-MCNC: 7.9 G/DL (ref 12–16)
HGB BLD-MCNC: 8.9 G/DL (ref 12–16)
HYPOCHROMIA BLD QL SMEAR: ABNORMAL
IMM GRANULOCYTES # BLD AUTO: ABNORMAL K/UL (ref 0–0.04)
IMM GRANULOCYTES # BLD AUTO: ABNORMAL K/UL (ref 0–0.04)
IMM GRANULOCYTES NFR BLD AUTO: ABNORMAL % (ref 0–0.5)
IMM GRANULOCYTES NFR BLD AUTO: ABNORMAL % (ref 0–0.5)
LYMPHOCYTES # BLD AUTO: ABNORMAL K/UL (ref 1–4.8)
LYMPHOCYTES NFR BLD: 1 % (ref 18–48)
LYMPHOCYTES NFR BLD: 2 % (ref 18–48)
MAGNESIUM SERPL-MCNC: 2.8 MG/DL (ref 1.6–2.6)
MCH RBC QN AUTO: 27.7 PG (ref 27–31)
MCH RBC QN AUTO: 28.3 PG (ref 27–31)
MCHC RBC AUTO-ENTMCNC: 34.2 G/DL (ref 32–36)
MCHC RBC AUTO-ENTMCNC: 35.6 G/DL (ref 32–36)
MCV RBC AUTO: 80 FL (ref 82–98)
MCV RBC AUTO: 81 FL (ref 82–98)
METAMYELOCYTES NFR BLD MANUAL: 1 %
METAMYELOCYTES NFR BLD MANUAL: 3 %
MONOCYTES # BLD AUTO: ABNORMAL K/UL (ref 0.3–1)
MONOCYTES NFR BLD: 4 % (ref 4–15)
MONOCYTES NFR BLD: 7 % (ref 4–15)
MYELOCYTES NFR BLD MANUAL: 1 %
MYELOCYTES NFR BLD MANUAL: 2 %
NEUTROPHILS NFR BLD: 87 % (ref 38–73)
NEUTROPHILS NFR BLD: 87 % (ref 38–73)
NEUTS BAND NFR BLD MANUAL: 2 %
NEUTS BAND NFR BLD MANUAL: 3 %
NRBC BLD-RTO: 1 /100 WBC
NRBC BLD-RTO: 1 /100 WBC
OVALOCYTES BLD QL SMEAR: ABNORMAL
PHOSPHATE SERPL-MCNC: 4.7 MG/DL (ref 2.7–4.5)
PLATELET # BLD AUTO: 600 K/UL (ref 150–450)
PLATELET # BLD AUTO: 640 K/UL (ref 150–450)
PLATELET BLD QL SMEAR: ABNORMAL
PLATELET BLD QL SMEAR: ABNORMAL
PMV BLD AUTO: 10.7 FL (ref 9.2–12.9)
PMV BLD AUTO: 10.8 FL (ref 9.2–12.9)
POCT GLUCOSE: 183 MG/DL (ref 70–110)
POCT GLUCOSE: 189 MG/DL (ref 70–110)
POCT GLUCOSE: 207 MG/DL (ref 70–110)
POIKILOCYTOSIS BLD QL SMEAR: SLIGHT
POLYCHROMASIA BLD QL SMEAR: ABNORMAL
POTASSIUM SERPL-SCNC: 4.1 MMOL/L (ref 3.5–5.1)
POTASSIUM SERPL-SCNC: 4.1 MMOL/L (ref 3.5–5.1)
PROT SERPL-MCNC: 7.1 G/DL (ref 6–8.4)
PROT SERPL-MCNC: 7.3 G/DL (ref 6–8.4)
PROTEINASE3 IGG SER-ACNC: 1.2 U
RBC # BLD AUTO: 2.85 M/UL (ref 4–5.4)
RBC # BLD AUTO: 3.14 M/UL (ref 4–5.4)
SODIUM SERPL-SCNC: 129 MMOL/L (ref 136–145)
SODIUM SERPL-SCNC: 134 MMOL/L (ref 136–145)
TARGETS BLD QL SMEAR: ABNORMAL
WBC # BLD AUTO: 42.24 K/UL (ref 3.9–12.7)
WBC # BLD AUTO: 43.48 K/UL (ref 3.9–12.7)
WBC TOXIC VACUOLES BLD QL SMEAR: PRESENT

## 2022-10-25 PROCEDURE — 20000000 HC ICU ROOM

## 2022-10-25 PROCEDURE — 63600175 PHARM REV CODE 636 W HCPCS: Performed by: INTERNAL MEDICINE

## 2022-10-25 PROCEDURE — 87305 ASPERGILLUS AG IA: CPT | Performed by: STUDENT IN AN ORGANIZED HEALTH CARE EDUCATION/TRAINING PROGRAM

## 2022-10-25 PROCEDURE — 80053 COMPREHEN METABOLIC PANEL: CPT | Mod: 91

## 2022-10-25 PROCEDURE — 99900035 HC TECH TIME PER 15 MIN (STAT)

## 2022-10-25 PROCEDURE — 84100 ASSAY OF PHOSPHORUS: CPT | Performed by: EMERGENCY MEDICINE

## 2022-10-25 PROCEDURE — 86682 HELMINTH ANTIBODY: CPT | Performed by: STUDENT IN AN ORGANIZED HEALTH CARE EDUCATION/TRAINING PROGRAM

## 2022-10-25 PROCEDURE — 99233 SBSQ HOSP IP/OBS HIGH 50: CPT | Mod: ,,, | Performed by: INTERNAL MEDICINE

## 2022-10-25 PROCEDURE — 94660 CPAP INITIATION&MGMT: CPT

## 2022-10-25 PROCEDURE — 94761 N-INVAS EAR/PLS OXIMETRY MLT: CPT

## 2022-10-25 PROCEDURE — C9113 INJ PANTOPRAZOLE SODIUM, VIA: HCPCS | Performed by: EMERGENCY MEDICINE

## 2022-10-25 PROCEDURE — 25000003 PHARM REV CODE 250: Performed by: EMERGENCY MEDICINE

## 2022-10-25 PROCEDURE — 25000003 PHARM REV CODE 250

## 2022-10-25 PROCEDURE — 86592 SYPHILIS TEST NON-TREP QUAL: CPT | Performed by: STUDENT IN AN ORGANIZED HEALTH CARE EDUCATION/TRAINING PROGRAM

## 2022-10-25 PROCEDURE — 80053 COMPREHEN METABOLIC PANEL: CPT | Performed by: EMERGENCY MEDICINE

## 2022-10-25 PROCEDURE — 99291 PR CRITICAL CARE, E/M 30-74 MINUTES: ICD-10-PCS | Mod: GC,,, | Performed by: EMERGENCY MEDICINE

## 2022-10-25 PROCEDURE — 63600175 PHARM REV CODE 636 W HCPCS: Performed by: EMERGENCY MEDICINE

## 2022-10-25 PROCEDURE — 27000221 HC OXYGEN, UP TO 24 HOURS

## 2022-10-25 PROCEDURE — 85007 BL SMEAR W/DIFF WBC COUNT: CPT | Mod: 91 | Performed by: EMERGENCY MEDICINE

## 2022-10-25 PROCEDURE — 85027 COMPLETE CBC AUTOMATED: CPT | Performed by: EMERGENCY MEDICINE

## 2022-10-25 PROCEDURE — 86787 VARICELLA-ZOSTER ANTIBODY: CPT | Performed by: STUDENT IN AN ORGANIZED HEALTH CARE EDUCATION/TRAINING PROGRAM

## 2022-10-25 PROCEDURE — 99231 PR SUBSEQUENT HOSPITAL CARE,LEVL I: ICD-10-PCS | Mod: ,,, | Performed by: INTERNAL MEDICINE

## 2022-10-25 PROCEDURE — 86706 HEP B SURFACE ANTIBODY: CPT | Mod: 91 | Performed by: STUDENT IN AN ORGANIZED HEALTH CARE EDUCATION/TRAINING PROGRAM

## 2022-10-25 PROCEDURE — 63600175 PHARM REV CODE 636 W HCPCS: Performed by: STUDENT IN AN ORGANIZED HEALTH CARE EDUCATION/TRAINING PROGRAM

## 2022-10-25 PROCEDURE — 83735 ASSAY OF MAGNESIUM: CPT | Performed by: EMERGENCY MEDICINE

## 2022-10-25 PROCEDURE — 99291 CRITICAL CARE FIRST HOUR: CPT | Mod: GC,,, | Performed by: EMERGENCY MEDICINE

## 2022-10-25 PROCEDURE — 86790 VIRUS ANTIBODY NOS: CPT | Performed by: STUDENT IN AN ORGANIZED HEALTH CARE EDUCATION/TRAINING PROGRAM

## 2022-10-25 PROCEDURE — 25000003 PHARM REV CODE 250: Performed by: INTERNAL MEDICINE

## 2022-10-25 PROCEDURE — 63700000 PHARM REV CODE 250 ALT 637 W/O HCPCS

## 2022-10-25 PROCEDURE — 99233 PR SUBSEQUENT HOSPITAL CARE,LEVL III: ICD-10-PCS | Mod: ,,, | Performed by: INTERNAL MEDICINE

## 2022-10-25 PROCEDURE — 25000003 PHARM REV CODE 250: Performed by: HOSPITALIST

## 2022-10-25 PROCEDURE — 63600175 PHARM REV CODE 636 W HCPCS

## 2022-10-25 PROCEDURE — 99231 SBSQ HOSP IP/OBS SF/LOW 25: CPT | Mod: ,,, | Performed by: INTERNAL MEDICINE

## 2022-10-25 PROCEDURE — 25000003 PHARM REV CODE 250: Performed by: STUDENT IN AN ORGANIZED HEALTH CARE EDUCATION/TRAINING PROGRAM

## 2022-10-25 RX ORDER — CEFEPIME HYDROCHLORIDE 1 G/50ML
2 INJECTION, SOLUTION INTRAVENOUS
Status: COMPLETED | OUTPATIENT
Start: 2022-10-25 | End: 2022-10-25

## 2022-10-25 RX ORDER — LIDOCAINE HYDROCHLORIDE 10 MG/ML
INJECTION INFILTRATION; PERINEURAL
Status: COMPLETED
Start: 2022-10-25 | End: 2022-10-25

## 2022-10-25 RX ORDER — PANTOPRAZOLE SODIUM 40 MG/1
40 TABLET, DELAYED RELEASE ORAL DAILY
Status: DISCONTINUED | OUTPATIENT
Start: 2022-10-26 | End: 2022-10-26

## 2022-10-25 RX ORDER — SODIUM BICARBONATE 650 MG/1
650 TABLET ORAL 2 TIMES DAILY
Status: DISCONTINUED | OUTPATIENT
Start: 2022-10-25 | End: 2022-10-26

## 2022-10-25 RX ORDER — SODIUM BICARBONATE 650 MG/1
650 TABLET ORAL ONCE
Status: DISCONTINUED | OUTPATIENT
Start: 2022-10-25 | End: 2022-10-25

## 2022-10-25 RX ORDER — CEFEPIME HYDROCHLORIDE 1 G/50ML
2 INJECTION, SOLUTION INTRAVENOUS
Status: DISCONTINUED | OUTPATIENT
Start: 2022-10-26 | End: 2022-10-26

## 2022-10-25 RX ORDER — SODIUM CHLORIDE 9 MG/ML
INJECTION, SOLUTION INTRAVENOUS CONTINUOUS
Status: CANCELLED | OUTPATIENT
Start: 2022-10-25 | End: 2022-10-25

## 2022-10-25 RX ORDER — AZITHROMYCIN 250 MG/1
250 TABLET, FILM COATED ORAL DAILY
Status: DISCONTINUED | OUTPATIENT
Start: 2022-10-25 | End: 2022-10-25

## 2022-10-25 RX ADMIN — LIDOCAINE HYDROCHLORIDE: 10 INJECTION, SOLUTION INFILTRATION; PERINEURAL at 09:10

## 2022-10-25 RX ADMIN — SODIUM BICARBONATE 650 MG TABLET 650 MG: at 12:10

## 2022-10-25 RX ADMIN — INSULIN ASPART 2 UNITS: 100 INJECTION, SOLUTION INTRAVENOUS; SUBCUTANEOUS at 06:10

## 2022-10-25 RX ADMIN — AZITHROMYCIN MONOHYDRATE 250 MG: 250 TABLET ORAL at 12:10

## 2022-10-25 RX ADMIN — INSULIN ASPART 1 UNITS: 100 INJECTION, SOLUTION INTRAVENOUS; SUBCUTANEOUS at 12:10

## 2022-10-25 RX ADMIN — METHOCARBAMOL 500 MG: 500 TABLET ORAL at 05:10

## 2022-10-25 RX ADMIN — SUCRALFATE 1 G: 1 SUSPENSION ORAL at 12:10

## 2022-10-25 RX ADMIN — HYDRALAZINE HYDROCHLORIDE 100 MG: 50 TABLET ORAL at 08:10

## 2022-10-25 RX ADMIN — LEVOTHYROXINE SODIUM 25 MCG: 25 TABLET ORAL at 06:10

## 2022-10-25 RX ADMIN — SODIUM BICARBONATE 650 MG TABLET 650 MG: at 09:10

## 2022-10-25 RX ADMIN — SUCRALFATE 1 G: 1 SUSPENSION ORAL at 06:10

## 2022-10-25 RX ADMIN — HYDRALAZINE HYDROCHLORIDE 100 MG: 50 TABLET ORAL at 09:10

## 2022-10-25 RX ADMIN — METHYLPREDNISOLONE SODIUM SUCCINATE 1000 MG: 1 INJECTION, POWDER, LYOPHILIZED, FOR SOLUTION INTRAMUSCULAR; INTRAVENOUS at 12:10

## 2022-10-25 RX ADMIN — METOPROLOL SUCCINATE 100 MG: 100 TABLET, EXTENDED RELEASE ORAL at 08:10

## 2022-10-25 RX ADMIN — INSULIN ASPART 4 UNITS: 100 INJECTION, SOLUTION INTRAVENOUS; SUBCUTANEOUS at 12:10

## 2022-10-25 RX ADMIN — AMLODIPINE BESYLATE 10 MG: 10 TABLET ORAL at 08:10

## 2022-10-25 RX ADMIN — CEFEPIME HYDROCHLORIDE 2 G: 2 INJECTION, SOLUTION INTRAVENOUS at 12:10

## 2022-10-25 RX ADMIN — METHYLPREDNISOLONE SODIUM SUCCINATE 1000 MG: 1 INJECTION, POWDER, LYOPHILIZED, FOR SOLUTION INTRAMUSCULAR; INTRAVENOUS at 08:10

## 2022-10-25 RX ADMIN — OMEGA-3-ACID ETHYL ESTERS 1 G: 1 CAPSULE, LIQUID FILLED ORAL at 08:10

## 2022-10-25 RX ADMIN — PANTOPRAZOLE SODIUM 40 MG: 40 INJECTION, POWDER, FOR SOLUTION INTRAVENOUS at 08:10

## 2022-10-25 NOTE — HOSPITAL COURSE
Pt transferred to ICU 10/23/22 2/2 respiratory distress in the setting of hemoptysis. Decision made to consult IR and rheumatology. High-dose solumedrol x 3 days followed by predinitiated. Complete rheumatologic work-up initiated. IR consulted for renal biopsy, planned for 10/25 but postponed. Worsening mental and respiratory status 10/25. Trialysis catheter placed overnight 10/25 with plans for plasmapheresis overnight. Pt tolerated procedure well despite multiple attempts to place line. Went for IR Renal biopsy AM of 10/26. Tolerated procedure well. Endorsing burning at szymanski insertion site. Continuing Azithromycin and Cefepime. Unable to start PJP prophylaxis due to sulfa allergy and inability to tolerate PO meds at this point due to worsening encephalopathy and mild sedation. Rheumatology planning to start rituximab and cyclophosphamide. 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 NG tube placed 10/27. Pt now receiving tube feeds and atovaquone via NG tube. Now getting plasma exchange, dialysis, steroids, rituximab, and cyclophosphamide. Nephro plans to continue HD. Tapering her steroids per rheum recs. Doing well on 2L NC. Stable for step down.

## 2022-10-25 NOTE — EICU
Rounding (Video Assessment):  Yes    Intervention Initiated From:  Bedside    Aiden Communicated with Bedside Nurse regarding:  Time-Out    eLerted for trialysis catheter placement. Time-out done. Right IJ trialysis catheter insertion attempted by Dr Timothy Montoya-supervised/assisted by Luiza Boone PA-C and Dr Magui Cage. Difficulty with placement - Procedure paused. Dr Mich Wing attempted left IJ trialysis catheter insertion--unsuccessful--Procedure aborted.   Pt tolerated well.

## 2022-10-25 NOTE — SUBJECTIVE & OBJECTIVE
Interval History: Reports slight improvement in SOB at rest, but continued cough, now dry, occasionally with blood. Denies any new symptoms such as diarrhea, dysuria, abdominal pain, sore throat, fevers.     Review of Systems   Constitutional:  Positive for activity change, appetite change and fatigue. Negative for fever.   HENT:  Positive for postnasal drip and rhinorrhea. Negative for sore throat and voice change.    Respiratory:  Positive for cough and shortness of breath.    Cardiovascular:  Negative for chest pain, palpitations and leg swelling.   Gastrointestinal:  Negative for abdominal distention, abdominal pain, nausea and vomiting.   Genitourinary:  Negative for difficulty urinating and dysuria.   Musculoskeletal:  Negative for back pain.   Neurological:  Negative for syncope.   Hematological:  Does not bruise/bleed easily.   Psychiatric/Behavioral:  Negative for behavioral problems, confusion and decreased concentration.        Objective:     Vital Signs (Most Recent):  Temp: 98.2 °F (36.8 °C) (10/24/22 1900)  Pulse: 76 (10/24/22 1900)  Resp: (!) 28 (10/24/22 1900)  BP: (!) 153/70 (10/24/22 1900)  SpO2: (!) 93 % (10/24/22 1900)   Vital Signs (24h Range):  Temp:  [97.2 °F (36.2 °C)-98.7 °F (37.1 °C)] 98.2 °F (36.8 °C)  Pulse:  [67-86] 76  Resp:  [20-39] 28  SpO2:  [89 %-100 %] 93 %  BP: (134-173)/(62-79) 153/70     Weight: 63.5 kg (139 lb 15.9 oz)  Body mass index is 26.45 kg/m².    Estimated Creatinine Clearance: 11.9 mL/min (A) (based on SCr of 3.5 mg/dL (H)).    Physical Exam  Constitutional:       Appearance: She is ill-appearing. She is not toxic-appearing.   HENT:      Head: Normocephalic and atraumatic.      Nose: Nose normal.      Mouth/Throat:      Mouth: Mucous membranes are moist.      Pharynx: Oropharynx is clear.   Eyes:      Extraocular Movements: Extraocular movements intact.      Conjunctiva/sclera: Conjunctivae normal.      Pupils: Pupils are equal, round, and reactive to light.    Cardiovascular:      Rate and Rhythm: Normal rate and regular rhythm.      Pulses: Normal pulses.      Heart sounds: Normal heart sounds.   Pulmonary:      Effort: Pulmonary effort is normal.      Breath sounds: Rales present.      Comments: On O2 via nasal canula  Abdominal:      General: Abdomen is flat. Bowel sounds are normal.      Palpations: Abdomen is soft.   Musculoskeletal:         General: No swelling or deformity. Normal range of motion.      Cervical back: Normal range of motion and neck supple.   Skin:     General: Skin is warm and dry.   Neurological:      Mental Status: She is alert and oriented to person, place, and time. Mental status is at baseline.   Psychiatric:         Mood and Affect: Mood normal.         Behavior: Behavior normal.         Thought Content: Thought content normal.         Judgment: Judgment normal.         Significant Labs: Blood Culture:   Recent Labs   Lab 09/24/22  1110 09/24/22  1118 10/14/22  1638 10/14/22  1653 10/17/22  1201   LABBLOO No Growth after 4 days. No Growth after 4 days. No Growth after 4 days. No Growth after 4 days. No growth after 5 days.  No growth after 5 days.     BMP:   Recent Labs   Lab 10/24/22  0353 10/24/22  0953   *  --      --    K 3.6 4.3     --    CO2 14*  --    BUN 96*  --    CREATININE 3.5*  --    CALCIUM 9.2  --      CBC:   Recent Labs   Lab 10/23/22  1130 10/24/22  0353   WBC 28.97* 17.60*   HGB 8.6* 14.6   HCT 24.5* 43.2   * 495*     CMP:   Recent Labs   Lab 10/23/22  0815 10/23/22  1822 10/24/22  0353 10/24/22  0953   NA  --  135* 136  --    K  --  3.3* 3.6 4.3   CL  --  106 105  --    CO2  --  17* 14*  --    GLU  --  136* 174*  --    BUN  --  88* 96*  --    CREATININE 3.0* 3.4* 3.5*  --    CALCIUM  --  8.6* 9.2  --    PROT  --  6.7 7.7  --    ALBUMIN  --  1.6* 1.8*  --    BILITOT  --  2.5* 3.1*  --    ALKPHOS  --  134 146*  --    AST  --  362* 266*  --    ALT  --  166* 164*  --    ANIONGAP  --  12 17*  --       Microbiology Results (last 7 days)       Procedure Component Value Units Date/Time    Influenza A & B by Molecular [[id number]] Collected: 10/24/22 0616    Order Status: Completed Specimen: Nasopharyngeal Swab Updated: 10/24/22 0814     Influenza A, Molecular Negative     Influenza B, Molecular Negative     Flu A & B Source NP    Blood culture #2 **CANNOT BE ORDERED STAT** [[id number]] Collected: 10/17/22 1201    Order Status: Completed Specimen: Blood from Peripheral, Antecubital, Left Updated: 10/22/22 1412     Blood Culture, Routine No growth after 5 days.    Blood culture #1 **CANNOT BE ORDERED STAT** [[id number]] Collected: 10/17/22 1201    Order Status: Completed Specimen: Blood from Peripheral, Forearm, Left Updated: 10/22/22 1412     Blood Culture, Routine No growth after 5 days.    Culture, Respiratory with Gram Stain [[id number]] Collected: 10/19/22 2139    Order Status: Completed Specimen: Respiratory from Sputum Updated: 10/22/22 0900     Respiratory Culture Normal respiratory zheng      No S aureus or Pseudomonas isolated.     Gram Stain (Respiratory) <10 epithelial cells per low power field.     Gram Stain (Respiratory) Rare WBC's     Gram Stain (Respiratory) Moderate Gram positive cocci     Gram Stain (Respiratory) Few Gram negative rods    Urine culture [[id number]] Collected: 10/17/22 1437    Order Status: Completed Specimen: Urine Updated: 10/18/22 2214     Urine Culture, Routine No growth    Narrative:      Specimen Source->Urine    Influenza A & B by Molecular [[id number]] Collected: 10/17/22 2308    Order Status: Completed Specimen: Nasal Swab Updated: 10/18/22 0010     Influenza A, Molecular Negative     Influenza B, Molecular Negative     Flu A & B Source Nasal swab          Pathology Results  (Last 10 years)                 09/28/20 1023  Specimen to Pathology, Surgery Gastrointestinal tract Final result    Narrative:  Dx: HTN, CHF, Hypothyroid   Preo procedure Dx: Colon cancer screen   Post  procedure Dx: Polyp, Diverticulosis, Hemorrhoids   Jar #1: Cecal polyp   Jar #2:Transverse polyp   Specimen total (fresh, frozen, permanent):->2             All pertinent labs within the past 24 hours have been reviewed.  Recent Lab Results  (Last 5 results in the past 24 hours)        10/24/22  1744   10/24/22  0953   10/24/22  0616   10/24/22  0353   10/23/22  2353        Influenza A, Molecular     Negative           Influenza B, Molecular     Negative           Albumin       1.8         Alkaline Phosphatase       146         ALT       164         Anion Gap       17         Aniso       Slight         aPTT       46.4  Comment: aPTT therapeutic range = 39-69 seconds         AST       266         Bands       5.0         Basophil %       0.0         BILIRUBIN TOTAL       3.1  Comment: For infants and newborns, interpretation of results should be based  on gestational age, weight and in agreement with clinical  observations.    Premature Infant recommended reference ranges:  Up to 24 hours.............<8.0 mg/dL  Up to 48 hours............<12.0 mg/dL  3-5 days..................<15.0 mg/dL  6-29 days.................<15.0 mg/dL           BNP         140  Comment: Values of less than 100 pg/ml are consistent with non-CHF populations.       BUN       96         Calcium       9.2         Chloride       105         CO2       14         Creatinine       3.5         Differential Method       Manual         eGFR       13.1         Eosinophil %       0.0         Flu A & B Source     NP           Large/Giant Platelets       Present         Glucose       174         Gran %       76.0         Hematocrit       43.2         Hemoglobin       14.6         Hypo       Occasional         Immature Grans (Abs)       CANCELED  Comment: Mild elevation in immature granulocytes is non specific and   can be seen in a variety of conditions including stress response,   acute inflammation, trauma and pregnancy. Correlation with other   laboratory  and clinical findings is essential.    Result canceled by the ancillary.           Immature Granulocytes       CANCELED  Comment: Result canceled by the ancillary.         INR       1.1  Comment: Coumadin Therapy:  2.0 - 3.0 for INR for all indicators except mechanical heart valves  and antiphospholipid syndromes which should use 2.5 - 3.5.           Lymph %       8.0         MCH       27.8         MCHC       33.8         MCV       82         Metamyelocytes       1.0         Mono %       8.0         MPV       10.3         Myelocytes       1.0         nRBC       1         Ovalocytes       Occasional         Platelet Estimate       Increased         Platelets       495         POCT Glucose 195               Poikilocytosis       Slight         Poly       Occasional         Potassium   4.3     3.6         Promyelocytes       1.0         PROTEIN TOTAL       7.7         Protime       11.6         RBC       5.26         RDW       14.1         Sodium       136         Spherocytes       Occasional         Target Cells       Occasional         Troponin I         0.008  Comment: The reference interval for Troponin I represents the 99th percentile   cutoff   for our facility and is consistent with 3rd generation assay   performance.         WBC       17.60                                Significant Imaging: I have reviewed all pertinent imaging results/findings within the past 24 hours.

## 2022-10-25 NOTE — SUBJECTIVE & OBJECTIVE
Interval History: NAEON. Patient on 8LNC this morning. Reports anxiety about her situation. Denies any significant change in resp status, no leg pain or swelling. Receiving 3rd dose Solumedrol today.     Review of patient's allergies indicates:   Allergen Reactions    Ampicillin     Penicillins      Other reaction(s): Hives    Sulfa (sulfonamide antibiotics) Rash and Hives     Current Facility-Administered Medications   Medication Frequency    0.9%  NaCl infusion (for blood administration) Q24H PRN    0.9%  NaCl infusion (for blood administration) Q24H PRN    acetaminophen tablet 1,000 mg Q8H PRN    acetaminophen tablet 650 mg Q4H PRN    albuterol-ipratropium 2.5 mg-0.5 mg/3 mL nebulizer solution 3 mL Q4H PRN    aluminum-magnesium hydroxide-simethicone 200-200-20 mg/5 mL suspension 30 mL QID PRN    amLODIPine tablet 10 mg Daily    bisacodyL suppository 10 mg Daily PRN    cloNIDine tablet 0.2 mg Q4H PRN    dextrose 10% bolus 125 mL PRN    dextrose 10% bolus 250 mL PRN    glucagon (human recombinant) injection 1 mg PRN    glucose chewable tablet 16 g PRN    glucose chewable tablet 24 g PRN    hydrALAZINE tablet 100 mg TID    hydrALAZINE tablet 25 mg Q8H PRN    insulin aspart U-100 pen 1-10 Units Q6H PRN    levothyroxine tablet 25 mcg Daily    melatonin tablet 6 mg Nightly PRN    methocarbamoL tablet 500 mg TID PRN    metoprolol succinate (TOPROL-XL) 24 hr tablet 100 mg Daily    naloxone 0.4 mg/mL injection 0.02 mg PRN    omega-3 acid ethyl esters capsule 1 g Daily    ondansetron disintegrating tablet 8 mg Q8H PRN    pantoprazole injection 40 mg Daily    prochlorperazine injection Soln 5 mg Q6H PRN    simethicone chewable tablet 80 mg QID PRN    sodium chloride 0.9% flush 5 mL PRN    sucralfate 100 mg/mL suspension 1 g Q6H     Facility-Administered Medications Ordered in Other Encounters   Medication Frequency    celecoxib capsule 400 mg Once    fentaNYL 50 mcg/mL injection  mcg PRN    LIDOcaine (PF) 10 mg/ml  (1%) injection 10 mg Once PRN    LIDOcaine (PF) 10 mg/ml (1%) injection 10 mg Once    midazolam (VERSED) 1 mg/mL injection 0.5-4 mg PRN    ropivacaine 0.2% [facility] PainPRO Pump infusion 500 ML Continuous       Objective:     Vital Signs (Most Recent):  Temp: 97.5 °F (36.4 °C) (10/25/22 0715)  Pulse: 77 (10/25/22 0900)  Resp: 19 (10/25/22 0900)  BP: (!) 147/68 (10/25/22 0900)  SpO2: 96 % (10/25/22 0900)  O2 Device (Oxygen Therapy): High Flow nasal Cannula (10/25/22 0828)   Vital Signs (24h Range):  Temp:  [97.5 °F (36.4 °C)-98.3 °F (36.8 °C)] 97.5 °F (36.4 °C)  Pulse:  [65-77] 77  Resp:  [19-35] 19  SpO2:  [91 %-100 %] 96 %  BP: ()/(46-74) 147/68     Weight: 63.5 kg (139 lb 15.9 oz) (10/17/22 1633)  Body mass index is 26.45 kg/m².  Body surface area is 1.65 meters squared.    I/O last 3 completed shifts:  In: 636.6 [P.O.:240; I.V.:120; IV Piggyback:276.6]  Out: 2150 [Urine:2150]    Physical Exam  Vitals and nursing note reviewed.   Constitutional:       General: She is not in acute distress.     Appearance: She is well-developed. She is ill-appearing.   HENT:      Head: Normocephalic and atraumatic.   Cardiovascular:      Rate and Rhythm: Normal rate and regular rhythm.      Heart sounds: Normal heart sounds.   Pulmonary:      Effort: Pulmonary effort is normal. No respiratory distress.      Breath sounds: Rales present. No wheezing.   Abdominal:      General: Bowel sounds are normal. There is no distension.      Palpations: Abdomen is soft.      Tenderness: There is no abdominal tenderness.   Musculoskeletal:         General: No tenderness. Normal range of motion.      Cervical back: Normal range of motion and neck supple.      Right lower leg: No edema.      Left lower leg: No edema.   Lymphadenopathy:      Cervical: No cervical adenopathy.   Skin:     General: Skin is warm and dry.      Capillary Refill: Capillary refill takes less than 2 seconds.      Findings: No rash.   Neurological:      General: No focal  deficit present.      Mental Status: She is alert and oriented to person, place, and time.   Psychiatric:         Mood and Affect: Mood normal.         Behavior: Behavior normal.       Significant Labs:  CBC:   Recent Labs   Lab 10/25/22  0325   WBC 43.48*   RBC 3.14*   HGB 8.9*   HCT 25.0*   *   MCV 80*   MCH 28.3   MCHC 35.6     CMP:   Recent Labs   Lab 10/25/22  0325   *   CALCIUM 8.8   ALBUMIN 1.8*   PROT 7.3   *   K 4.1   CO2 16*      *   CREATININE 4.4*   ALKPHOS 160*   *   *   BILITOT 2.1*     All labs within the past 24 hours have been reviewed.     Significant Imaging:

## 2022-10-25 NOTE — SUBJECTIVE & OBJECTIVE
Interval History/Significant Events: NAEON. Pt with worsening mental status this AM.     Review of Systems   Constitutional:  Negative for chills, fever and unexpected weight change.   HENT:  Negative for sore throat.    Eyes:  Negative for photophobia and visual disturbance.   Respiratory:  Positive for cough and shortness of breath. Negative for wheezing.    Cardiovascular:  Negative for chest pain and leg swelling.   Gastrointestinal:  Negative for abdominal pain, diarrhea and vomiting.   Genitourinary:  Negative for dysuria and frequency.   Musculoskeletal:  Positive for back pain. Negative for arthralgias and joint swelling.   Skin:  Negative for rash.   Neurological:  Negative for dizziness and headaches.   Psychiatric/Behavioral:  Negative for behavioral problems and hallucinations.    Objective:     Vital Signs (Most Recent):  Temp: 97.8 °F (36.6 °C) (10/25/22 1504)  Pulse: 74 (10/25/22 1800)  Resp: (!) 25 (10/25/22 1800)  BP: 130/60 (10/25/22 1800)  SpO2: 100 % (10/25/22 1800)   Vital Signs (24h Range):  Temp:  [96.9 °F (36.1 °C)-98.3 °F (36.8 °C)] 97.8 °F (36.6 °C)  Pulse:  [62-77] 74  Resp:  [13-33] 25  SpO2:  [89 %-100 %] 100 %  BP: ()/(46-70) 130/60   Weight: 63.5 kg (139 lb 15.9 oz)  Body mass index is 26.45 kg/m².      Intake/Output Summary (Last 24 hours) at 10/25/2022 1853  Last data filed at 10/25/2022 1847  Gross per 24 hour   Intake 480 ml   Output 822 ml   Net -342 ml       Physical Exam  Vitals and nursing note reviewed.   Constitutional:       General: She is not in acute distress.     Appearance: She is well-developed. She is ill-appearing.   HENT:      Head: Normocephalic and atraumatic.   Cardiovascular:      Rate and Rhythm: Normal rate and regular rhythm.      Heart sounds: Normal heart sounds.   Pulmonary:      Effort: Pulmonary effort is normal. No respiratory distress.      Breath sounds: Rales present. No wheezing.   Abdominal:      General: Bowel sounds are normal. There is no  distension.      Palpations: Abdomen is soft.      Tenderness: There is no abdominal tenderness.   Musculoskeletal:         General: No tenderness. Normal range of motion.      Cervical back: Normal range of motion and neck supple.      Right lower leg: No edema.      Left lower leg: No edema.   Lymphadenopathy:      Cervical: No cervical adenopathy.   Skin:     General: Skin is warm and dry.      Capillary Refill: Capillary refill takes less than 2 seconds.      Findings: No rash.   Neurological:      General: No focal deficit present.      Mental Status: She is oriented to person, place, and time. She is lethargic and confused.   Psychiatric:         Mood and Affect: Mood normal.         Behavior: Behavior normal.       Vents:  Oxygen Concentration (%): 50 (10/24/22 2200)  Lines/Drains/Airways       Drain  Duration                  Urethral Catheter 10/23/22 2230 16 Fr. 1 day              Peripheral Intravenous Line  Duration                  Midline Catheter Insertion/Assessment  - Single Lumen 10/18/22 1831 Left brachial vein 18g x 10cm 7 days         Peripheral IV - Single Lumen 10/24/22 0424 20 G Right Antecubital 1 day                  Significant Labs:    CBC/Anemia Profile:  Recent Labs   Lab 10/24/22  0353 10/25/22  0325 10/25/22  1557   WBC 17.60* 43.48* 42.24*   HGB 14.6 8.9* 7.9*   HCT 43.2 25.0* 23.1*   * 640* 600*   MCV 82 80* 81*   RDW 14.1 13.8 14.2        Chemistries:  Recent Labs   Lab 10/24/22  0353 10/24/22  0953 10/25/22  0325 10/25/22  1416     --  129* 134*   K 3.6 4.3 4.1 4.1     --  101 103   CO2 14*  --  16* 15*   BUN 96*  --  123* 140*   CREATININE 3.5*  --  4.4* 5.1*   CALCIUM 9.2  --  8.8 8.5*   ALBUMIN 1.8*  --  1.8* 1.8*   PROT 7.7  --  7.3 7.1   BILITOT 3.1*  --  2.1* 2.0*   ALKPHOS 146*  --  160* 180*   *  --  137* 113*   *  --  166* 97*   MG  --   --  2.8*  --    PHOS  --   --  4.7*  --        CMP:   Recent Labs   Lab 10/24/22  0353 10/24/22  09  10/25/22  0325 10/25/22  1416     --  129* 134*   K 3.6 4.3 4.1 4.1     --  101 103   CO2 14*  --  16* 15*   *  --  166* 187*   BUN 96*  --  123* 140*   CREATININE 3.5*  --  4.4* 5.1*   CALCIUM 9.2  --  8.8 8.5*   PROT 7.7  --  7.3 7.1   ALBUMIN 1.8*  --  1.8* 1.8*   BILITOT 3.1*  --  2.1* 2.0*   ALKPHOS 146*  --  160* 180*   *  --  166* 97*   *  --  137* 113*   ANIONGAP 17*  --  12 16     All pertinent labs within the past 24 hours have been reviewed.    Significant Imaging:  I have reviewed all pertinent imaging results/findings within the past 24 hours.

## 2022-10-25 NOTE — PLAN OF CARE
[facility] DAILY GOALS       A: Awake    RASS: Goal -    Actual -     Restraint necessity:    B: Breathe   SBT: Not intubated   C: Coordinate A & B, analgesics/sedatives   Pain: managed    SAT: Not intubated  D: Delirium   CAM-ICU: Overall CAM-ICU: Negative  E: Early(intubated/ Progressive (non-intubated) Mobility   MOVE Screen: Pass   Activity: Activity Management: Rolling - L1  FAS: Feeding/Nutrition   Diet order: Diet/Nutrition Received: regular,    T: Thrombus   DVT prophylaxis: VTE Required Core Measure: Pharmacological prophylaxis initiated/maintained  H: HOB Elevation   Head of Bed (HOB) Positioning: HOB elevated  U: Ulcer Prophylaxis   GI: yes  G: Glucose control   managed Glycemic Management: oral hydration promoted  S: Skin   Bathing/Skin Care: bath, complete, dressed/undressed, linen changed  Device Skin Pressure Protection: absorbent pad utilized/changed  Pressure Reduction Devices: specialty bed utilized  Pressure Reduction Techniques: weight shift assistance provided  Skin Protection: adhesive use limited  B: Bowel Function   no issues   I: Indwelling Catheters   Curry necessity:      Urethral Catheter 10/23/22 2230 16 Fr.-Reason for Continuing Urinary Catheterization: Critically ill in ICU and requiring hourly monitoring of intake/output   CVC necessity: No  D: De-escalation Antibiotics   Yes    Family/Goals of care/Code Status   Code Status: Full Code    24H Vital Sign Range  Temp:  [97.2 °F (36.2 °C)-98.3 °F (36.8 °C)]   Pulse:  [66-77]   Resp:  [20-35]   BP: ()/(46-74)   SpO2:  [91 %-100 %]      Shift Events   No acute events throughout shift    VS and assessment per flow sheet, patient progressing towards goals as tolerated, plan of care reviewed with family, all concerns addressed, will continue to monitor.    Jo Hauser

## 2022-10-25 NOTE — ASSESSMENT & PLAN NOTE
Unclear etiology at this time - resp cx with normal resp zheng and blcx ngtd. Given that pt has not improved while on abx (PO levo as an outpatient and current course), reported transient joint pain, renal injury/congestion at home, and hemoptysis, consider non-infectious (autoimmune) work up for hemoptysis. TTE with pulm HTN.      Recommendations:  -RIP and fungal markers ordered for completeness, given normal resp zheng on respiratory culture, this is less likely infectious.  -If antibiotic therapy is still desired, consider PO Azithromycin for a 5 day course. We will follow up urinary legionella antigen  -Appreciate rheumatology service recs- pending further workup, agree on atovaquone 1500 mg liquid PO daily in light of continued steroid use.

## 2022-10-25 NOTE — PROGRESS NOTES
[facility]  Nephrology  Progress Note    Patient Name: Kristin Goodman  MRN: [id number]  Admission Date: 10/17/2022  Hospital Length of Stay: 8 days  Attending Provider: Fox Holbrook MD   Primary Care Physician: Lori Hernandez MD  Principal Problem:Sepsis due to pneumonia    Subjective:     HPI: Kristin Goodman is a 77 year old female with hypertension, hypothyroidism, HFpEF who presents for cough, shortness of breath, and weakness.  Patient initially presented to ER on 09/24 with hemoptysis and imaging concerning for multilobar pneumonia at which time she was discharged on a 10 day course of levofloxacin.  Patient initially had some improvement but began having worsening symptoms on 10/14.  Patient describes multiple fevers and progressive shortness of breath.  She continues to have intermittent hemoptysis.  Patient with worsening leukocytosis and limited clinical improvement despite broad-spectrum antibiotics.  She remains on cefepime.  Patient is a noted to have baseline creatinine of 1 as recent as 8/2022 but progressive worsening of creatinine in early October.  On admission patient with creatinine of 1.6 (10/17) with subsequent progression and creatinine of 3.0 at time of consultation (10/23).  Nephrology consulted for acute kidney injury.      Interval History: NAEON. Patient on 8LNC this morning. Reports anxiety about her situation. Denies any significant change in resp status, no leg pain or swelling. Receiving 3rd dose Solumedrol today.     Review of patient's allergies indicates:   Allergen Reactions    Ampicillin     Penicillins      Other reaction(s): Hives    Sulfa (sulfonamide antibiotics) Rash and Hives     Current Facility-Administered Medications   Medication Frequency    0.9%  NaCl infusion (for blood administration) Q24H PRN    0.9%  NaCl infusion (for blood administration) Q24H PRN    acetaminophen tablet 1,000 mg Q8H PRN    acetaminophen tablet 650 mg Q4H PRN     albuterol-ipratropium 2.5 mg-0.5 mg/3 mL nebulizer solution 3 mL Q4H PRN    aluminum-magnesium hydroxide-simethicone 200-200-20 mg/5 mL suspension 30 mL QID PRN    amLODIPine tablet 10 mg Daily    bisacodyL suppository 10 mg Daily PRN    cloNIDine tablet 0.2 mg Q4H PRN    dextrose 10% bolus 125 mL PRN    dextrose 10% bolus 250 mL PRN    glucagon (human recombinant) injection 1 mg PRN    glucose chewable tablet 16 g PRN    glucose chewable tablet 24 g PRN    hydrALAZINE tablet 100 mg TID    hydrALAZINE tablet 25 mg Q8H PRN    insulin aspart U-100 pen 1-10 Units Q6H PRN    levothyroxine tablet 25 mcg Daily    melatonin tablet 6 mg Nightly PRN    methocarbamoL tablet 500 mg TID PRN    metoprolol succinate (TOPROL-XL) 24 hr tablet 100 mg Daily    naloxone 0.4 mg/mL injection 0.02 mg PRN    omega-3 acid ethyl esters capsule 1 g Daily    ondansetron disintegrating tablet 8 mg Q8H PRN    pantoprazole injection 40 mg Daily    prochlorperazine injection Soln 5 mg Q6H PRN    simethicone chewable tablet 80 mg QID PRN    sodium chloride 0.9% flush 5 mL PRN    sucralfate 100 mg/mL suspension 1 g Q6H     Facility-Administered Medications Ordered in Other Encounters   Medication Frequency    celecoxib capsule 400 mg Once    fentaNYL 50 mcg/mL injection  mcg PRN    LIDOcaine (PF) 10 mg/ml (1%) injection 10 mg Once PRN    LIDOcaine (PF) 10 mg/ml (1%) injection 10 mg Once    midazolam (VERSED) 1 mg/mL injection 0.5-4 mg PRN    ropivacaine 0.2% [facility] PainPRO Pump infusion 500 ML Continuous       Objective:     Vital Signs (Most Recent):  Temp: 97.5 °F (36.4 °C) (10/25/22 0715)  Pulse: 77 (10/25/22 0900)  Resp: 19 (10/25/22 0900)  BP: (!) 147/68 (10/25/22 0900)  SpO2: 96 % (10/25/22 0900)  O2 Device (Oxygen Therapy): High Flow nasal Cannula (10/25/22 0828)   Vital Signs (24h Range):  Temp:  [97.5 °F (36.4 °C)-98.3 °F (36.8 °C)] 97.5 °F (36.4 °C)  Pulse:  [65-77] 77  Resp:  [19-35] 19  SpO2:  [91  %-100 %] 96 %  BP: ()/(46-74) 147/68     Weight: 63.5 kg (139 lb 15.9 oz) (10/17/22 1633)  Body mass index is 26.45 kg/m².  Body surface area is 1.65 meters squared.    I/O last 3 completed shifts:  In: 636.6 [P.O.:240; I.V.:120; IV Piggyback:276.6]  Out: 2150 [Urine:2150]    Physical Exam  Vitals and nursing note reviewed.   Constitutional:       General: She is not in acute distress.     Appearance: She is well-developed. She is ill-appearing.   HENT:      Head: Normocephalic and atraumatic.   Cardiovascular:      Rate and Rhythm: Normal rate and regular rhythm.      Heart sounds: Normal heart sounds.   Pulmonary:      Effort: Pulmonary effort is normal. No respiratory distress.      Breath sounds: Rales present. No wheezing.   Abdominal:      General: Bowel sounds are normal. There is no distension.      Palpations: Abdomen is soft.      Tenderness: There is no abdominal tenderness.   Musculoskeletal:         General: No tenderness. Normal range of motion.      Cervical back: Normal range of motion and neck supple.      Right lower leg: No edema.      Left lower leg: No edema.   Lymphadenopathy:      Cervical: No cervical adenopathy.   Skin:     General: Skin is warm and dry.      Capillary Refill: Capillary refill takes less than 2 seconds.      Findings: No rash.   Neurological:      General: No focal deficit present.      Mental Status: She is alert and oriented to person, place, and time.   Psychiatric:         Mood and Affect: Mood normal.         Behavior: Behavior normal.       Significant Labs:  CBC:   Recent Labs   Lab 10/25/22  0325   WBC 43.48*   RBC 3.14*   HGB 8.9*   HCT 25.0*   *   MCV 80*   MCH 28.3   MCHC 35.6     CMP:   Recent Labs   Lab 10/25/22  0325   *   CALCIUM 8.8   ALBUMIN 1.8*   PROT 7.3   *   K 4.1   CO2 16*      *   CREATININE 4.4*   ALKPHOS 160*   *   *   BILITOT 2.1*     All labs within the past 24 hours have been reviewed.      Significant Imaging:       Assessment/Plan:     Hyponatremia  Patient with worsening hyponatremia since admission.  Baseline sodium 140, on admission 135 worsening to 126 but subsequently up trending to 129 at the time of consultation (10/23). 129 on 10/25.     - daily BMP    Acute renal failure superimposed on stage 3 chronic kidney disease  Patient with baseline creatinine of 1 as recent as August 2022 with progressive worsening beginning in early October 1.3 (10/13)->1.6 (10/17)->3.0 (10/23) despite IV fluids.  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  Patient noted to have new onset proteinuria and hematuria over the last 1 month.  Additionally, would consider ATN in the setting of suspected sepsis from multifocal pneumonia.     Concerns for glomerulonephritis given patient's current clinical picture. Initial urine microscopy demonstrating muddy brown casts with likely acanthocytes noted as well. MITUL positive. Patient s/p IV solumedrol x3 doses. Plan for kidney bx today.     Recommendations:  - will plan to re-assess urine sediment today   - recommend transition to PO prednisone tomorrow 10/26  - serologies ordered (MITUL, ANCA, anti-GBM, IgA, C3, C4, HCV, HBV, cryo, RF) pending final results  - strict intake and output  - renally dose medications and avoid nephrotoxins when possible        Thank you for your consult. I will follow-up with patient. Please contact us if you have any additional questions.    Bipin Frias MD  Nephrology  [facility] - Cardiac Medical ICU

## 2022-10-25 NOTE — SUBJECTIVE & OBJECTIVE
Interval History: Large Hb drop from 14 to 8.9 overnight. WBC count up to 43 and platelets 640. Creatinine worsened to 4.4. ALT/AST are improving. She increased from 6L to 8L high flow NC.    Current Facility-Administered Medications   Medication Frequency    0.9%  NaCl infusion (for blood administration) Q24H PRN    0.9%  NaCl infusion (for blood administration) Q24H PRN    acetaminophen tablet 1,000 mg Q8H PRN    acetaminophen tablet 650 mg Q4H PRN    albuterol-ipratropium 2.5 mg-0.5 mg/3 mL nebulizer solution 3 mL Q4H PRN    aluminum-magnesium hydroxide-simethicone 200-200-20 mg/5 mL suspension 30 mL QID PRN    amLODIPine tablet 10 mg Daily    bisacodyL suppository 10 mg Daily PRN    cloNIDine tablet 0.2 mg Q4H PRN    dextrose 10% bolus 125 mL PRN    dextrose 10% bolus 250 mL PRN    glucagon (human recombinant) injection 1 mg PRN    glucose chewable tablet 16 g PRN    glucose chewable tablet 24 g PRN    hydrALAZINE tablet 100 mg TID    hydrALAZINE tablet 25 mg Q8H PRN    insulin aspart U-100 pen 1-10 Units Q6H PRN    levothyroxine tablet 25 mcg Daily    melatonin tablet 6 mg Nightly PRN    methocarbamoL tablet 500 mg TID PRN    metoprolol succinate (TOPROL-XL) 24 hr tablet 100 mg Daily    naloxone 0.4 mg/mL injection 0.02 mg PRN    omega-3 acid ethyl esters capsule 1 g Daily    ondansetron disintegrating tablet 8 mg Q8H PRN    pantoprazole injection 40 mg Daily    prochlorperazine injection Soln 5 mg Q6H PRN    simethicone chewable tablet 80 mg QID PRN    sodium chloride 0.9% flush 5 mL PRN    sucralfate 100 mg/mL suspension 1 g Q6H     Facility-Administered Medications Ordered in Other Encounters   Medication Frequency    celecoxib capsule 400 mg Once    fentaNYL 50 mcg/mL injection  mcg PRN    LIDOcaine (PF) 10 mg/ml (1%) injection 10 mg Once PRN    LIDOcaine (PF) 10 mg/ml (1%) injection 10 mg Once    midazolam (VERSED) 1 mg/mL injection 0.5-4 mg PRN    ropivacaine 0.2% [facility] PainPRO Pump infusion 500 ML  Continuous     Objective:     Vital Signs (Most Recent):  Temp: 97.5 °F (36.4 °C) (10/25/22 0715)  Pulse: 77 (10/25/22 0900)  Resp: 19 (10/25/22 0900)  BP: (!) 147/68 (10/25/22 0900)  SpO2: 96 % (10/25/22 0900)  O2 Device (Oxygen Therapy): High Flow nasal Cannula (10/25/22 0828)   Vital Signs (24h Range):  Temp:  [97.5 °F (36.4 °C)-98.3 °F (36.8 °C)] 97.5 °F (36.4 °C)  Pulse:  [65-77] 77  Resp:  [19-35] 19  SpO2:  [91 %-100 %] 96 %  BP: ()/(46-74) 147/68     Weight: 63.5 kg (139 lb 15.9 oz) (10/17/22 1633)  Body mass index is 26.45 kg/m².  Body surface area is 1.65 meters squared.      Intake/Output Summary (Last 24 hours) at 10/25/2022 0927  Last data filed at 10/25/2022 0919  Gross per 24 hour   Intake 90 ml   Output 1303 ml   Net -1213 ml       Physical Exam   Constitutional: She is oriented to person, place, and time. No distress.   Appears fatigued   HENT:   Head: Normocephalic and atraumatic.   Eyes: Pupils are equal, round, and reactive to light.   Cardiovascular: Normal rate and regular rhythm.   No murmur heard.  Pulmonary/Chest: Effort normal. No respiratory distress. She has no wheezes. She has rales.   Abdominal: Soft. Bowel sounds are normal. There is no abdominal tenderness.   Musculoskeletal:         General: No deformity. Normal range of motion.      Cervical back: Normal range of motion.   Neurological: She is alert and oriented to person, place, and time.   Skin: Skin is warm and dry.   Psychiatric: Affect and judgment normal.     Significant Labs:  CBC:   Recent Labs   Lab 10/25/22  0325   WBC 43.48*   HGB 8.9*   HCT 25.0*   *     CMP:   Recent Labs   Lab 10/25/22  0325   *   CALCIUM 8.8   ALBUMIN 1.8*   PROT 7.3   *   K 4.1   CO2 16*      *   CREATININE 4.4*   ALKPHOS 160*   *   *   BILITOT 2.1*       Significant Imaging:  Imaging results within the past 24 hours have been reviewed.

## 2022-10-25 NOTE — ASSESSMENT & PLAN NOTE
Elevated CPK    Patient w/o CKD presenting with WILFREDO with rapidly increasing sCr (baseline sCr 1.0-1.2). New onset proteinuria/hematuria in last 30 days. .  DDx: ATN vs GN.     Serum creatinine: 5.1 mg/dL (H) 10/25/22 1416  Estimated creatinine clearance: 8.1 mL/min (A)    -- F/U serologies per Nephro (MITUL, ANCA, anti-GBM, IgA, C3, C4, HCV, HBV, cryo, RF)  -- Plan for renal bx per nephro when stable (w/ IR vs interventional nephrology)  -- IV Solumedrol 1mg x3 days  -- Trend sCr  -- Strict I&Os  -- Avoid nephrotoxic agents  -- Renally adjust medications  -- added bicarb today  -- Nephro and Rheumatology discussing plasmapheresis--blood bank consulted.

## 2022-10-25 NOTE — PROGRESS NOTES
[facility] - Cardiac Medical ICU  Infectious Disease  Progress Note    Patient Name: Kristin Goodman  MRN: [id number]  Admission Date: 10/17/2022  Length of Stay: 7 days  Attending Physician: Fox Holbrook MD  Primary Care Provider: Lori Hernandez MD    Isolation Status: No active isolations  Assessment/Plan:      Multifocal pneumonia  Unclear etiology at this time - resp cx with normal resp zheng and blcx ngtd. Given that pt has not improved while on abx (PO levo as an outpatient and current course), reported transient joint pain, renal injury/congestion at home, and hemoptysis, consider non-infectious (autoimmune) work up for hemoptysis. TTE with pulm HTN.      Recommendations:  -RIP and fungal markers ordered for completeness, given normal resp zheng on respiratory culture, this is less likely infectious.  -If antibiotic therapy is still desired, consider PO Azithromycin for a 5 day course. We will follow up urinary legionella antigen  -Appreciate rheumatology service recs- pending further workup, agree on atovaquone 1500 mg liquid PO daily in light of continued steroid use.               Anticipated Disposition: TBD    Thank you for your consult. I will follow-up with patient. Please contact us if you have any additional questions.    Bryan Garcia MD  Infectious Disease  [facility] - Cardiac Medical ICU    Subjective:     Principal Problem:Sepsis due to pneumonia    HPI: 78 yo female with hypothyroidism, HTN, and heart failure admitted for hypoxic respiratory failure and fevers. HPI obtained from daughter at bedside 2/2 pt's somnolence. Pt's daughter reported that pt initially presented to ED on 9/24/22 for hemoptysis - work up at that time c/f pneumonia and pt was given 10d course of levofloxacin. She completed abx with mild improvement in symptoms. Prior to current admission, pt had several day hx of fevers, sob, and hemoptysis. Admission course notable for CT with sol GGO and infectious work up (sputum,  blcx) unrevealing. Pt is currently on vanc and cefepime. Per daughter, pt works in a childcare 3x a week, denied tb exposure, toxic habits, international travel, and has unclear allergies to sulfa or pcn (?rash). She also reported that her mother had transient joint pain in arm/knee; ankle swelling + nasal congestion( of note, hx of cleft palate). Has 1 dog at home, prior smoker and reported family hx of cancers (breast, prostate).       Interval History: Reports slight improvement in SOB at rest, but continued cough, now dry, occasionally with blood. Denies any new symptoms such as diarrhea, dysuria, abdominal pain, sore throat, fevers.     Review of Systems   Constitutional:  Positive for activity change, appetite change and fatigue. Negative for fever.   HENT:  Positive for postnasal drip and rhinorrhea. Negative for sore throat and voice change.    Respiratory:  Positive for cough and shortness of breath.    Cardiovascular:  Negative for chest pain, palpitations and leg swelling.   Gastrointestinal:  Negative for abdominal distention, abdominal pain, nausea and vomiting.   Genitourinary:  Negative for difficulty urinating and dysuria.   Musculoskeletal:  Negative for back pain.   Neurological:  Negative for syncope.   Hematological:  Does not bruise/bleed easily.   Psychiatric/Behavioral:  Negative for behavioral problems, confusion and decreased concentration.        Objective:     Vital Signs (Most Recent):  Temp: 98.2 °F (36.8 °C) (10/24/22 1900)  Pulse: 76 (10/24/22 1900)  Resp: (!) 28 (10/24/22 1900)  BP: (!) 153/70 (10/24/22 1900)  SpO2: (!) 93 % (10/24/22 1900)   Vital Signs (24h Range):  Temp:  [97.2 °F (36.2 °C)-98.7 °F (37.1 °C)] 98.2 °F (36.8 °C)  Pulse:  [67-86] 76  Resp:  [20-39] 28  SpO2:  [89 %-100 %] 93 %  BP: (134-173)/(62-79) 153/70     Weight: 63.5 kg (139 lb 15.9 oz)  Body mass index is 26.45 kg/m².    Estimated Creatinine Clearance: 11.9 mL/min (A) (based on SCr of 3.5 mg/dL  (H)).    Physical Exam  Constitutional:       Appearance: She is ill-appearing. She is not toxic-appearing.   HENT:      Head: Normocephalic and atraumatic.      Nose: Nose normal.      Mouth/Throat:      Mouth: Mucous membranes are moist.      Pharynx: Oropharynx is clear.   Eyes:      Extraocular Movements: Extraocular movements intact.      Conjunctiva/sclera: Conjunctivae normal.      Pupils: Pupils are equal, round, and reactive to light.   Cardiovascular:      Rate and Rhythm: Normal rate and regular rhythm.      Pulses: Normal pulses.      Heart sounds: Normal heart sounds.   Pulmonary:      Effort: Pulmonary effort is normal.      Breath sounds: Rales present.      Comments: On O2 via nasal canula  Abdominal:      General: Abdomen is flat. Bowel sounds are normal.      Palpations: Abdomen is soft.   Musculoskeletal:         General: No swelling or deformity. Normal range of motion.      Cervical back: Normal range of motion and neck supple.   Skin:     General: Skin is warm and dry.   Neurological:      Mental Status: She is alert and oriented to person, place, and time. Mental status is at baseline.   Psychiatric:         Mood and Affect: Mood normal.         Behavior: Behavior normal.         Thought Content: Thought content normal.         Judgment: Judgment normal.         Significant Labs: Blood Culture:   Recent Labs   Lab 09/24/22  1110 09/24/22  1118 10/14/22  1638 10/14/22  1653 10/17/22  1201   LABBLOO No Growth after 4 days. No Growth after 4 days. No Growth after 4 days. No Growth after 4 days. No growth after 5 days.  No growth after 5 days.     BMP:   Recent Labs   Lab 10/24/22  0353 10/24/22  0953   *  --      --    K 3.6 4.3     --    CO2 14*  --    BUN 96*  --    CREATININE 3.5*  --    CALCIUM 9.2  --      CBC:   Recent Labs   Lab 10/23/22  1130 10/24/22  0353   WBC 28.97* 17.60*   HGB 8.6* 14.6   HCT 24.5* 43.2   * 495*     CMP:   Recent Labs   Lab 10/23/22  0815  10/23/22  1822 10/24/22  0353 10/24/22  0953   NA  --  135* 136  --    K  --  3.3* 3.6 4.3   CL  --  106 105  --    CO2  --  17* 14*  --    GLU  --  136* 174*  --    BUN  --  88* 96*  --    CREATININE 3.0* 3.4* 3.5*  --    CALCIUM  --  8.6* 9.2  --    PROT  --  6.7 7.7  --    ALBUMIN  --  1.6* 1.8*  --    BILITOT  --  2.5* 3.1*  --    ALKPHOS  --  134 146*  --    AST  --  362* 266*  --    ALT  --  166* 164*  --    ANIONGAP  --  12 17*  --      Microbiology Results (last 7 days)       Procedure Component Value Units Date/Time    Influenza A & B by Molecular [[id number]] Collected: 10/24/22 0616    Order Status: Completed Specimen: Nasopharyngeal Swab Updated: 10/24/22 0814     Influenza A, Molecular Negative     Influenza B, Molecular Negative     Flu A & B Source NP    Blood culture #2 **CANNOT BE ORDERED STAT** [[id number]] Collected: 10/17/22 1201    Order Status: Completed Specimen: Blood from Peripheral, Antecubital, Left Updated: 10/22/22 1412     Blood Culture, Routine No growth after 5 days.    Blood culture #1 **CANNOT BE ORDERED STAT** [[id number]] Collected: 10/17/22 1201    Order Status: Completed Specimen: Blood from Peripheral, Forearm, Left Updated: 10/22/22 1412     Blood Culture, Routine No growth after 5 days.    Culture, Respiratory with Gram Stain [[id number]] Collected: 10/19/22 2139    Order Status: Completed Specimen: Respiratory from Sputum Updated: 10/22/22 0900     Respiratory Culture Normal respiratory zheng      No S aureus or Pseudomonas isolated.     Gram Stain (Respiratory) <10 epithelial cells per low power field.     Gram Stain (Respiratory) Rare WBC's     Gram Stain (Respiratory) Moderate Gram positive cocci     Gram Stain (Respiratory) Few Gram negative rods    Urine culture [[id number]] Collected: 10/17/22 1437    Order Status: Completed Specimen: Urine Updated: 10/18/22 2214     Urine Culture, Routine No growth    Narrative:      Specimen Source->Urine    Influenza A & B by Molecular  [[id number]] Collected: 10/17/22 2300    Order Status: Completed Specimen: Nasal Swab Updated: 10/18/22 0010     Influenza A, Molecular Negative     Influenza B, Molecular Negative     Flu A & B Source Nasal swab          Pathology Results  (Last 10 years)                 09/28/20 1023  Specimen to Pathology, Surgery Gastrointestinal tract Final result    Narrative:  Dx: HTN, CHF, Hypothyroid   Preo procedure Dx: Colon cancer screen   Post procedure Dx: Polyp, Diverticulosis, Hemorrhoids   Jar #1: Cecal polyp   Jar #2:Transverse polyp   Specimen total (fresh, frozen, permanent):->2             All pertinent labs within the past 24 hours have been reviewed.  Recent Lab Results  (Last 5 results in the past 24 hours)        10/24/22  1744   10/24/22  0953   10/24/22  0616   10/24/22  0353   10/23/22  2353        Influenza A, Molecular     Negative           Influenza B, Molecular     Negative           Albumin       1.8         Alkaline Phosphatase       146         ALT       164         Anion Gap       17         Aniso       Slight         aPTT       46.4  Comment: aPTT therapeutic range = 39-69 seconds         AST       266         Bands       5.0         Basophil %       0.0         BILIRUBIN TOTAL       3.1  Comment: For infants and newborns, interpretation of results should be based  on gestational age, weight and in agreement with clinical  observations.    Premature Infant recommended reference ranges:  Up to 24 hours.............<8.0 mg/dL  Up to 48 hours............<12.0 mg/dL  3-5 days..................<15.0 mg/dL  6-29 days.................<15.0 mg/dL           BNP         140  Comment: Values of less than 100 pg/ml are consistent with non-CHF populations.       BUN       96         Calcium       9.2         Chloride       105         CO2       14         Creatinine       3.5         Differential Method       Manual         eGFR       13.1         Eosinophil %       0.0         Flu A & B Source     NP            Large/Giant Platelets       Present         Glucose       174         Gran %       76.0         Hematocrit       43.2         Hemoglobin       14.6         Hypo       Occasional         Immature Grans (Abs)       CANCELED  Comment: Mild elevation in immature granulocytes is non specific and   can be seen in a variety of conditions including stress response,   acute inflammation, trauma and pregnancy. Correlation with other   laboratory and clinical findings is essential.    Result canceled by the ancillary.           Immature Granulocytes       CANCELED  Comment: Result canceled by the ancillary.         INR       1.1  Comment: Coumadin Therapy:  2.0 - 3.0 for INR for all indicators except mechanical heart valves  and antiphospholipid syndromes which should use 2.5 - 3.5.           Lymph %       8.0         MCH       27.8         MCHC       33.8         MCV       82         Metamyelocytes       1.0         Mono %       8.0         MPV       10.3         Myelocytes       1.0         nRBC       1         Ovalocytes       Occasional         Platelet Estimate       Increased         Platelets       495         POCT Glucose 195               Poikilocytosis       Slight         Poly       Occasional         Potassium   4.3     3.6         Promyelocytes       1.0         PROTEIN TOTAL       7.7         Protime       11.6         RBC       5.26         RDW       14.1         Sodium       136         Spherocytes       Occasional         Target Cells       Occasional         Troponin I         0.008  Comment: The reference interval for Troponin I represents the 99th percentile   cutoff   for our facility and is consistent with 3rd generation assay   performance.         WBC       17.60                                Significant Imaging: I have reviewed all pertinent imaging results/findings within the past 24 hours.

## 2022-10-25 NOTE — ASSESSMENT & PLAN NOTE
Patient is a 78 yo F with chronic diastolic heart failure, HTN, OA, who is admitted for respiratory failure. Rheumatology is consulted due to concern for vasculitis.    Patient presented with cough and hemoptysis since 9/24/22. She was admitted due to dyspnea and hypoxia on 10/17. She has had Hb drop to 6 this admission requiring blood transfusions. GI defers invasive workup for now because she is not stable enough. She has had increasing creatinine from baseline of 1 to 3.0 on 10/23/22. Nephrology concerned for ATN nephritic picture in urine sediment.    Reviewed her chest imaging which shows progressive disease from 10/14 until now which can be concerning for DAH. This might also explain the Hb drop. No bronch planned for now due to her tenuous respiratory status.    CBC: 17 WBCs, Hb 14 now (s/p transfusion), plt 495  CMP: creatinine 3.5, GFR 13. AST//164  UA: 1+ blood, 88 RBCs, 18 WBCs  UPCR 1.54  Complements normal  RF and CCP negative    MITUL+ 1:2560 homogenous, profile pending  PR3 slightly elevated at 1.2 (reference positive is 1.0)  GBM antibodies negative    Pending: ANCAs, MPO, cryoglobulins    IV Solumedrol 1000 mg daily, day 2 of 3    Large Hb drop from 14 to 8.9 overnight. WBC count up to 43 and platelets 640. Possible that yesterday's CBC was incorrect because Hb day before was in the 8's. However, creatinine worsened to 4.4 and urine output decreased. She increased oxygen requirements from 6L to 8L high flow NC.  ALT/AST are improving. Discussed with Nephrology who are concerned that she will need dialysis soon. She will get a central line for possible PLEX. 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      Recommendations:  1. Follow up pending labs as above. Also added Quantiferon and other infectious labs prior to giving chemotherapy.  2. Kidney biopsy delayed until next week due  to scheduling issues.  3. PO prednisone 60 mg daily.  4. Please start Bactrim-DS three times weekly or atovaquone 1500 mg daily while on high dose steroids.  5. Continue Protonix daily while on high dose steroids.  6. Will plan for Rituximab 375 mg/m^2 weekly for 4 doses. Cyclophosphamide 12.5 mg/kg added on weeks 1 and 3.      Discussed with attending physician, Dr. Palacio. Attending attestation to follow.    Layne Aguirre MD  Rheumatology Fellow  PGY-5

## 2022-10-25 NOTE — ASSESSMENT & PLAN NOTE
Patient with worsening hyponatremia since admission.  Baseline sodium 140, on admission 135 worsening to 126 but subsequently up trending to 129 at the time of consultation (10/23). 129 on 10/25.     - daily BMP

## 2022-10-25 NOTE — ASSESSMENT & PLAN NOTE
Patient with baseline creatinine of 1 as recent as August 2022 with progressive worsening beginning in early October 1.3 (10/13)->1.6 (10/17)->3.0 (10/23) despite IV fluids.  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  Patient noted to have new onset proteinuria and hematuria over the last 1 month.  Additionally, would consider ATN in the setting of suspected sepsis from multifocal pneumonia.     Concerns for glomerulonephritis given patient's current clinical picture. Initial urine microscopy demonstrating muddy brown casts with likely acanthocytes noted as well. MITUL positive. Patient s/p IV solumedrol x3 doses. Plan for kidney bx today.     Recommendations:  - will plan to re-assess urine sediment today   - recommend transition to PO prednisone tomorrow 10/26  - serologies ordered (MITUL, ANCA, anti-GBM, IgA, C3, C4, HCV, HBV, cryo, RF) pending final results  - strict intake and output  - renally dose medications and avoid nephrotoxins when possible

## 2022-10-26 ENCOUNTER — ANESTHESIA EVENT (OUTPATIENT)
Dept: INTERVENTIONAL RADIOLOGY/VASCULAR | Facility: HOSPITAL | Age: 77
DRG: 871 | End: 2022-10-26
Payer: MEDICARE

## 2022-10-26 PROBLEM — M31.7 MICROSCOPIC POLYANGIITIS: Status: ACTIVE | Noted: 2022-10-26

## 2022-10-26 PROBLEM — N17.9 ACUTE RENAL FAILURE: Status: ACTIVE | Noted: 2022-10-26

## 2022-10-26 LAB
1,3 BETA GLUCAN SER-MCNC: <31 PG/ML
ABO + RH BLD: NORMAL
ALBUMIN SERPL BCP-MCNC: 1.8 G/DL (ref 3.5–5.2)
ALLENS TEST: ABNORMAL
ALLENS TEST: ABNORMAL
ALP SERPL-CCNC: 136 U/L (ref 55–135)
ALT SERPL W/O P-5'-P-CCNC: 85 U/L (ref 10–44)
ANION GAP SERPL CALC-SCNC: 13 MMOL/L (ref 8–16)
ANION GAP SERPL CALC-SCNC: 16 MMOL/L (ref 8–16)
ANTI SM ANTIBODY: 0.08 RATIO (ref 0–0.99)
ANTI SM/RNP ANTIBODY: 0.1 RATIO (ref 0–0.99)
ANTI-SM INTERPRETATION: NEGATIVE
ANTI-SM/RNP INTERPRETATION: NEGATIVE
ANTI-SSA ANTIBODY: 0.1 RATIO (ref 0–0.99)
ANTI-SSA INTERPRETATION: NEGATIVE
ANTI-SSB ANTIBODY: 0.07 RATIO (ref 0–0.99)
ANTI-SSB INTERPRETATION: NEGATIVE
AST SERPL-CCNC: 57 U/L (ref 10–40)
B DERMAT AG UR QL IA: NOT DETECTED
BASO STIPL BLD QL SMEAR: ABNORMAL
BASOPHILS # BLD AUTO: ABNORMAL K/UL (ref 0–0.2)
BASOPHILS NFR BLD: 0 % (ref 0–1.9)
BILIRUB SERPL-MCNC: 1.7 MG/DL (ref 0.1–1)
BLASTOMYCES AG RESULT: NOT DETECTED NG/ML
BLD GP AB SCN CELLS X3 SERPL QL: NORMAL
BLD PROD TYP BPU: NORMAL
BLOOD UNIT EXPIRATION DATE: NORMAL
BLOOD UNIT TYPE CODE: 1700
BLOOD UNIT TYPE CODE: 1700
BLOOD UNIT TYPE CODE: 7300
BLOOD UNIT TYPE: NORMAL
BUN SERPL-MCNC: 154 MG/DL (ref 8–23)
BUN SERPL-MCNC: 172 MG/DL (ref 8–23)
CALCIUM SERPL-MCNC: 8.2 MG/DL (ref 8.7–10.5)
CALCIUM SERPL-MCNC: 8.3 MG/DL (ref 8.7–10.5)
CHLORIDE SERPL-SCNC: 105 MMOL/L (ref 95–110)
CHLORIDE SERPL-SCNC: 106 MMOL/L (ref 95–110)
CO2 SERPL-SCNC: 15 MMOL/L (ref 23–29)
CO2 SERPL-SCNC: 16 MMOL/L (ref 23–29)
CODING SYSTEM: NORMAL
CREAT SERPL-MCNC: 5.7 MG/DL (ref 0.5–1.4)
CREAT SERPL-MCNC: 6 MG/DL (ref 0.5–1.4)
DELSYS: ABNORMAL
DELSYS: ABNORMAL
DIFFERENTIAL METHOD: ABNORMAL
DISPENSE STATUS: NORMAL
DNA TITER: NORMAL
DSDNA AB SER-ACNC: POSITIVE [IU]/ML
EOSINOPHIL # BLD AUTO: ABNORMAL K/UL (ref 0–0.5)
EOSINOPHIL NFR BLD: 0 % (ref 0–8)
ERYTHROCYTE [DISTWIDTH] IN BLOOD BY AUTOMATED COUNT: 14.5 % (ref 11.5–14.5)
EST. GFR  (NO RACE VARIABLE): 6.8 ML/MIN/1.73 M^2
EST. GFR  (NO RACE VARIABLE): 7.3 ML/MIN/1.73 M^2
FLOW: 8
FUNGITELL COMMENTS: NEGATIVE
GIANT PLATELETS BLD QL SMEAR: PRESENT
GLUCOSE SERPL-MCNC: 171 MG/DL (ref 70–110)
GLUCOSE SERPL-MCNC: 181 MG/DL (ref 70–110)
H CAPSUL AG UR-MCNC: NOT DETECTED NG/ML
HCO3 UR-SCNC: 18.2 MMOL/L (ref 24–28)
HCO3 UR-SCNC: 18.5 MMOL/L (ref 24–28)
HCT VFR BLD AUTO: 21.8 % (ref 37–48.5)
HGB BLD-MCNC: 7.3 G/DL (ref 12–16)
HISTOPLASMA ANTIGEN URINE: NOT DETECTED
IMM GRANULOCYTES # BLD AUTO: ABNORMAL K/UL (ref 0–0.04)
IMM GRANULOCYTES NFR BLD AUTO: ABNORMAL % (ref 0–0.5)
L PNEUMO AG UR QL IA: NEGATIVE
LYMPHOCYTES # BLD AUTO: ABNORMAL K/UL (ref 1–4.8)
LYMPHOCYTES NFR BLD: 4 % (ref 18–48)
MAGNESIUM SERPL-MCNC: 3 MG/DL (ref 1.6–2.6)
MAGNESIUM SERPL-MCNC: 3.2 MG/DL (ref 1.6–2.6)
MCH RBC QN AUTO: 27.9 PG (ref 27–31)
MCHC RBC AUTO-ENTMCNC: 33.5 G/DL (ref 32–36)
MCV RBC AUTO: 83 FL (ref 82–98)
METAMYELOCYTES NFR BLD MANUAL: 4 %
MODE: ABNORMAL
MONOCYTES # BLD AUTO: ABNORMAL K/UL (ref 0.3–1)
MONOCYTES NFR BLD: 6 % (ref 4–15)
MYELOCYTES NFR BLD MANUAL: 3 %
MYELOPEROXIDASE AB SER-ACNC: 132 UNITS
NEUTROPHILS NFR BLD: 78 % (ref 38–73)
NEUTS BAND NFR BLD MANUAL: 3 %
NRBC BLD-RTO: 2 /100 WBC
NUM UNITS TRANS FFP: NORMAL
OVALOCYTES BLD QL SMEAR: ABNORMAL
PCO2 BLDA: 35.4 MMHG (ref 35–45)
PCO2 BLDA: 40.9 MMHG (ref 35–45)
PH SMN: 7.26 [PH] (ref 7.35–7.45)
PH SMN: 7.32 [PH] (ref 7.35–7.45)
PHOSPHATE SERPL-MCNC: 6.3 MG/DL (ref 2.7–4.5)
PLATELET # BLD AUTO: 562 K/UL (ref 150–450)
PLATELET BLD QL SMEAR: ABNORMAL
PMV BLD AUTO: 10.6 FL (ref 9.2–12.9)
PO2 BLDA: 38 MMHG (ref 40–60)
PO2 BLDA: 39 MMHG (ref 40–60)
POC BE: -8 MMOL/L
POC BE: -9 MMOL/L
POC SATURATED O2: 66 % (ref 95–100)
POC SATURATED O2: 69 % (ref 95–100)
POC TCO2: 19 MMOL/L (ref 24–29)
POC TCO2: 20 MMOL/L (ref 24–29)
POCT GLUCOSE: 187 MG/DL (ref 70–110)
POCT GLUCOSE: 200 MG/DL (ref 70–110)
POCT GLUCOSE: 209 MG/DL (ref 70–110)
POCT GLUCOSE: 221 MG/DL (ref 70–110)
POCT GLUCOSE: 224 MG/DL (ref 70–110)
POIKILOCYTOSIS BLD QL SMEAR: SLIGHT
POLYCHROMASIA BLD QL SMEAR: ABNORMAL
POTASSIUM SERPL-SCNC: 4.2 MMOL/L (ref 3.5–5.1)
POTASSIUM SERPL-SCNC: 4.3 MMOL/L (ref 3.5–5.1)
PROMYELOCYTES NFR BLD MANUAL: 2 %
PROT SERPL-MCNC: 6.4 G/DL (ref 6–8.4)
RBC # BLD AUTO: 2.62 M/UL (ref 4–5.4)
RPR SER QL: NORMAL
SAMPLE: ABNORMAL
SAMPLE: ABNORMAL
SITE: ABNORMAL
SITE: ABNORMAL
SODIUM SERPL-SCNC: 135 MMOL/L (ref 136–145)
SODIUM SERPL-SCNC: 136 MMOL/L (ref 136–145)
SP02: 96
TARGETS BLD QL SMEAR: ABNORMAL
TOXIC GRANULES BLD QL SMEAR: PRESENT
VARICELLA INTERPRETATION: POSITIVE
VARICELLA ZOSTER IGG: 3.29 ISR (ref 0–0.9)
WBC # BLD AUTO: 38.52 K/UL (ref 3.9–12.7)

## 2022-10-26 PROCEDURE — 85007 BL SMEAR W/DIFF WBC COUNT: CPT | Performed by: STUDENT IN AN ORGANIZED HEALTH CARE EDUCATION/TRAINING PROGRAM

## 2022-10-26 PROCEDURE — P9017 PLASMA 1 DONOR FRZ W/IN 8 HR: HCPCS | Performed by: PATHOLOGY

## 2022-10-26 PROCEDURE — 93010 ELECTROCARDIOGRAM REPORT: CPT | Mod: ,,, | Performed by: INTERNAL MEDICINE

## 2022-10-26 PROCEDURE — 86480 TB TEST CELL IMMUN MEASURE: CPT | Performed by: STUDENT IN AN ORGANIZED HEALTH CARE EDUCATION/TRAINING PROGRAM

## 2022-10-26 PROCEDURE — 27000221 HC OXYGEN, UP TO 24 HOURS

## 2022-10-26 PROCEDURE — 84100 ASSAY OF PHOSPHORUS: CPT | Performed by: STUDENT IN AN ORGANIZED HEALTH CARE EDUCATION/TRAINING PROGRAM

## 2022-10-26 PROCEDURE — 25000003 PHARM REV CODE 250

## 2022-10-26 PROCEDURE — 63600175 PHARM REV CODE 636 W HCPCS: Performed by: STUDENT IN AN ORGANIZED HEALTH CARE EDUCATION/TRAINING PROGRAM

## 2022-10-26 PROCEDURE — 94660 CPAP INITIATION&MGMT: CPT

## 2022-10-26 PROCEDURE — 36514 APHERESIS PLASMA: CPT

## 2022-10-26 PROCEDURE — 25000003 PHARM REV CODE 250: Performed by: STUDENT IN AN ORGANIZED HEALTH CARE EDUCATION/TRAINING PROGRAM

## 2022-10-26 PROCEDURE — C9113 INJ PANTOPRAZOLE SODIUM, VIA: HCPCS | Performed by: STUDENT IN AN ORGANIZED HEALTH CARE EDUCATION/TRAINING PROGRAM

## 2022-10-26 PROCEDURE — 27100171 HC OXYGEN HIGH FLOW UP TO 24 HOURS

## 2022-10-26 PROCEDURE — 99232 SBSQ HOSP IP/OBS MODERATE 35: CPT | Mod: ,,, | Performed by: INTERNAL MEDICINE

## 2022-10-26 PROCEDURE — 93010 EKG 12-LEAD: ICD-10-PCS | Mod: ,,, | Performed by: INTERNAL MEDICINE

## 2022-10-26 PROCEDURE — 25000003 PHARM REV CODE 250: Performed by: NURSE ANESTHETIST, CERTIFIED REGISTERED

## 2022-10-26 PROCEDURE — 83735 ASSAY OF MAGNESIUM: CPT | Performed by: STUDENT IN AN ORGANIZED HEALTH CARE EDUCATION/TRAINING PROGRAM

## 2022-10-26 PROCEDURE — 36514 PR THER APHERESIS,PLASMA PHERESIS: ICD-10-PCS | Mod: ,,, | Performed by: PATHOLOGY

## 2022-10-26 PROCEDURE — D9220A PRA ANESTHESIA: Mod: CRNA,,, | Performed by: NURSE ANESTHETIST, CERTIFIED REGISTERED

## 2022-10-26 PROCEDURE — D9220A PRA ANESTHESIA: ICD-10-PCS | Mod: ANES,,, | Performed by: STUDENT IN AN ORGANIZED HEALTH CARE EDUCATION/TRAINING PROGRAM

## 2022-10-26 PROCEDURE — 82803 BLOOD GASES ANY COMBINATION: CPT

## 2022-10-26 PROCEDURE — 99900035 HC TECH TIME PER 15 MIN (STAT)

## 2022-10-26 PROCEDURE — 80048 BASIC METABOLIC PNL TOTAL CA: CPT | Mod: XB | Performed by: STUDENT IN AN ORGANIZED HEALTH CARE EDUCATION/TRAINING PROGRAM

## 2022-10-26 PROCEDURE — 86901 BLOOD TYPING SEROLOGIC RH(D): CPT | Performed by: NURSE PRACTITIONER

## 2022-10-26 PROCEDURE — D9220A PRA ANESTHESIA: ICD-10-PCS | Mod: CRNA,,, | Performed by: NURSE ANESTHETIST, CERTIFIED REGISTERED

## 2022-10-26 PROCEDURE — 36514 APHERESIS PLASMA: CPT | Mod: ,,, | Performed by: PATHOLOGY

## 2022-10-26 PROCEDURE — 86920 COMPATIBILITY TEST SPIN: CPT | Performed by: STUDENT IN AN ORGANIZED HEALTH CARE EDUCATION/TRAINING PROGRAM

## 2022-10-26 PROCEDURE — 25000003 PHARM REV CODE 250: Performed by: PATHOLOGY

## 2022-10-26 PROCEDURE — 99291 CRITICAL CARE FIRST HOUR: CPT | Mod: GC,,, | Performed by: EMERGENCY MEDICINE

## 2022-10-26 PROCEDURE — 63600175 PHARM REV CODE 636 W HCPCS: Performed by: PATHOLOGY

## 2022-10-26 PROCEDURE — D9220A PRA ANESTHESIA: Mod: ANES,,, | Performed by: STUDENT IN AN ORGANIZED HEALTH CARE EDUCATION/TRAINING PROGRAM

## 2022-10-26 PROCEDURE — 93005 ELECTROCARDIOGRAM TRACING: CPT

## 2022-10-26 PROCEDURE — 63600175 PHARM REV CODE 636 W HCPCS: Performed by: NURSE ANESTHETIST, CERTIFIED REGISTERED

## 2022-10-26 PROCEDURE — 85027 COMPLETE CBC AUTOMATED: CPT | Performed by: STUDENT IN AN ORGANIZED HEALTH CARE EDUCATION/TRAINING PROGRAM

## 2022-10-26 PROCEDURE — 80505 PR PATH CLINICAL CONSULT, HIGH COMPLEX, 41-60 MIN: ICD-10-PCS | Mod: ,,, | Performed by: PATHOLOGY

## 2022-10-26 PROCEDURE — 99232 PR SUBSEQUENT HOSPITAL CARE,LEVL II: ICD-10-PCS | Mod: ,,, | Performed by: INTERNAL MEDICINE

## 2022-10-26 PROCEDURE — 99291 PR CRITICAL CARE, E/M 30-74 MINUTES: ICD-10-PCS | Mod: GC,,, | Performed by: EMERGENCY MEDICINE

## 2022-10-26 PROCEDURE — 80505 PATH CLIN CONSLTJ HIGH 41-60: CPT | Mod: ,,, | Performed by: PATHOLOGY

## 2022-10-26 PROCEDURE — 80053 COMPREHEN METABOLIC PANEL: CPT | Performed by: STUDENT IN AN ORGANIZED HEALTH CARE EDUCATION/TRAINING PROGRAM

## 2022-10-26 PROCEDURE — 25000003 PHARM REV CODE 250: Performed by: HOSPITALIST

## 2022-10-26 PROCEDURE — 94761 N-INVAS EAR/PLS OXIMETRY MLT: CPT

## 2022-10-26 PROCEDURE — 63600175 PHARM REV CODE 636 W HCPCS: Performed by: EMERGENCY MEDICINE

## 2022-10-26 PROCEDURE — 20000000 HC ICU ROOM

## 2022-10-26 RX ORDER — METHYLPREDNISOLONE SOD SUCC 125 MG
100 VIAL (EA) INJECTION DAILY
Status: CANCELLED
Start: 2022-10-26

## 2022-10-26 RX ORDER — ACETAMINOPHEN 325 MG/1
650 TABLET ORAL EVERY 4 HOURS PRN
Status: DISCONTINUED | OUTPATIENT
Start: 2022-10-26 | End: 2022-12-13 | Stop reason: HOSPADM

## 2022-10-26 RX ORDER — DEXMEDETOMIDINE HYDROCHLORIDE 4 UG/ML
0-1.4 INJECTION, SOLUTION INTRAVENOUS CONTINUOUS
Status: DISCONTINUED | OUTPATIENT
Start: 2022-10-26 | End: 2022-10-27

## 2022-10-26 RX ORDER — MIDAZOLAM HYDROCHLORIDE 1 MG/ML
INJECTION INTRAMUSCULAR; INTRAVENOUS
Status: DISPENSED
Start: 2022-10-26 | End: 2022-10-26

## 2022-10-26 RX ORDER — PHENYLEPHRINE HCL IN 0.9% NACL 1 MG/10 ML
SYRINGE (ML) INTRAVENOUS
Status: DISPENSED
Start: 2022-10-26 | End: 2022-10-26

## 2022-10-26 RX ORDER — PANTOPRAZOLE SODIUM 40 MG/10ML
40 INJECTION, POWDER, LYOPHILIZED, FOR SOLUTION INTRAVENOUS DAILY
Status: DISCONTINUED | OUTPATIENT
Start: 2022-10-26 | End: 2022-10-31

## 2022-10-26 RX ORDER — CALCIUM GLUCONATE 20 MG/ML
2 INJECTION, SOLUTION INTRAVENOUS ONCE
Status: COMPLETED | OUTPATIENT
Start: 2022-10-27 | End: 2022-10-27

## 2022-10-26 RX ORDER — CEFEPIME HYDROCHLORIDE 1 G/50ML
1 INJECTION, SOLUTION INTRAVENOUS
Status: DISCONTINUED | OUTPATIENT
Start: 2022-10-26 | End: 2022-10-28

## 2022-10-26 RX ORDER — MIDAZOLAM HYDROCHLORIDE 1 MG/ML
INJECTION, SOLUTION INTRAMUSCULAR; INTRAVENOUS
Status: DISCONTINUED | OUTPATIENT
Start: 2022-10-26 | End: 2022-10-26

## 2022-10-26 RX ORDER — CALCIUM GLUCONATE 20 MG/ML
2 INJECTION, SOLUTION INTRAVENOUS ONCE
Status: COMPLETED | OUTPATIENT
Start: 2022-10-26 | End: 2022-10-26

## 2022-10-26 RX ORDER — DEXMEDETOMIDINE HYDROCHLORIDE 100 UG/ML
INJECTION, SOLUTION INTRAVENOUS CONTINUOUS PRN
Status: DISCONTINUED | OUTPATIENT
Start: 2022-10-26 | End: 2022-10-26

## 2022-10-26 RX ORDER — HYDRALAZINE HYDROCHLORIDE 20 MG/ML
10 INJECTION INTRAMUSCULAR; INTRAVENOUS EVERY 8 HOURS PRN
Status: DISCONTINUED | OUTPATIENT
Start: 2022-10-26 | End: 2022-11-02

## 2022-10-26 RX ORDER — HEPARIN SODIUM 1000 [USP'U]/ML
3000 INJECTION, SOLUTION INTRAVENOUS; SUBCUTANEOUS ONCE
Status: COMPLETED | OUTPATIENT
Start: 2022-10-26 | End: 2022-10-26

## 2022-10-26 RX ORDER — NOREPINEPHRINE BITARTRATE 1 MG/ML
INJECTION, SOLUTION INTRAVENOUS
Status: DISCONTINUED | OUTPATIENT
Start: 2022-10-26 | End: 2022-10-26

## 2022-10-26 RX ORDER — ONDANSETRON 2 MG/ML
INJECTION INTRAMUSCULAR; INTRAVENOUS
Status: DISCONTINUED | OUTPATIENT
Start: 2022-10-26 | End: 2022-10-26

## 2022-10-26 RX ORDER — HEPARIN 100 UNIT/ML
500 SYRINGE INTRAVENOUS
Status: CANCELLED | OUTPATIENT
Start: 2022-10-26

## 2022-10-26 RX ORDER — FENTANYL CITRATE 50 UG/ML
INJECTION, SOLUTION INTRAMUSCULAR; INTRAVENOUS
Status: DISCONTINUED | OUTPATIENT
Start: 2022-10-26 | End: 2022-10-26

## 2022-10-26 RX ORDER — FENTANYL CITRATE 50 UG/ML
INJECTION, SOLUTION INTRAMUSCULAR; INTRAVENOUS
Status: DISPENSED
Start: 2022-10-26 | End: 2022-10-26

## 2022-10-26 RX ORDER — HEPARIN SODIUM 1000 [USP'U]/ML
3200 INJECTION, SOLUTION INTRAVENOUS; SUBCUTANEOUS ONCE
Status: COMPLETED | OUTPATIENT
Start: 2022-10-27 | End: 2022-10-27

## 2022-10-26 RX ORDER — DIPHENHYDRAMINE HYDROCHLORIDE 50 MG/ML
50 INJECTION INTRAMUSCULAR; INTRAVENOUS ONCE
Status: COMPLETED | OUTPATIENT
Start: 2022-10-26 | End: 2022-10-26

## 2022-10-26 RX ORDER — LIDOCAINE HYDROCHLORIDE 10 MG/ML
INJECTION INFILTRATION; PERINEURAL
Status: COMPLETED
Start: 2022-10-26 | End: 2022-10-26

## 2022-10-26 RX ORDER — KETAMINE HCL IN 0.9 % NACL 50 MG/5 ML
SYRINGE (ML) INTRAVENOUS
Status: DISCONTINUED | OUTPATIENT
Start: 2022-10-26 | End: 2022-10-26

## 2022-10-26 RX ORDER — DEXAMETHASONE SODIUM PHOSPHATE 4 MG/ML
INJECTION, SOLUTION INTRA-ARTICULAR; INTRALESIONAL; INTRAMUSCULAR; INTRAVENOUS; SOFT TISSUE
Status: DISCONTINUED | OUTPATIENT
Start: 2022-10-26 | End: 2022-10-26

## 2022-10-26 RX ORDER — MORPHINE SULFATE 2 MG/ML
1 INJECTION, SOLUTION INTRAMUSCULAR; INTRAVENOUS ONCE AS NEEDED
Status: COMPLETED | OUTPATIENT
Start: 2022-10-26 | End: 2022-10-26

## 2022-10-26 RX ORDER — DIPHENHYDRAMINE HYDROCHLORIDE 50 MG/ML
50 INJECTION INTRAMUSCULAR; INTRAVENOUS ONCE
Status: COMPLETED | OUTPATIENT
Start: 2022-10-27 | End: 2022-10-27

## 2022-10-26 RX ADMIN — CEFEPIME HYDROCHLORIDE 1 G: 1 INJECTION, SOLUTION INTRAVENOUS at 11:10

## 2022-10-26 RX ADMIN — MORPHINE SULFATE 1 MG: 2 INJECTION, SOLUTION INTRAMUSCULAR; INTRAVENOUS at 05:10

## 2022-10-26 RX ADMIN — HEPARIN SODIUM 3000 UNITS: 1000 INJECTION, SOLUTION INTRAVENOUS; SUBCUTANEOUS at 03:10

## 2022-10-26 RX ADMIN — SUCRALFATE 1 G: 1 SUSPENSION ORAL at 05:10

## 2022-10-26 RX ADMIN — DEXAMETHASONE SODIUM PHOSPHATE 4 MG: 4 INJECTION, SOLUTION INTRAMUSCULAR; INTRAVENOUS at 10:10

## 2022-10-26 RX ADMIN — ACETAMINOPHEN 1000 MG: 500 TABLET ORAL at 06:10

## 2022-10-26 RX ADMIN — DEXMEDETOMIDINE HYDROCHLORIDE 0.1 MCG/KG/HR: 4 INJECTION INTRAVENOUS at 07:10

## 2022-10-26 RX ADMIN — INSULIN ASPART 4 UNITS: 100 INJECTION, SOLUTION INTRAVENOUS; SUBCUTANEOUS at 06:10

## 2022-10-26 RX ADMIN — ONDANSETRON 4 MG: 2 INJECTION INTRAMUSCULAR; INTRAVENOUS at 10:10

## 2022-10-26 RX ADMIN — DIPHENHYDRAMINE HYDROCHLORIDE 50 MG: 50 INJECTION, SOLUTION INTRAMUSCULAR; INTRAVENOUS at 03:10

## 2022-10-26 RX ADMIN — METHOCARBAMOL 500 MG: 500 TABLET ORAL at 06:10

## 2022-10-26 RX ADMIN — ACETAMINOPHEN 650 MG: 325 TABLET ORAL at 05:10

## 2022-10-26 RX ADMIN — FENTANYL CITRATE 25 MCG: 50 INJECTION, SOLUTION INTRAMUSCULAR; INTRAVENOUS at 10:10

## 2022-10-26 RX ADMIN — DEXMEDETOMIDINE HYDROCHLORIDE 0.6 MCG/KG/HR: 100 INJECTION, SOLUTION INTRAVENOUS at 10:10

## 2022-10-26 RX ADMIN — METHYLPREDNISOLONE SODIUM SUCCINATE 50 MG: 40 INJECTION, POWDER, FOR SOLUTION INTRAMUSCULAR; INTRAVENOUS at 04:10

## 2022-10-26 RX ADMIN — INSULIN ASPART 2 UNITS: 100 INJECTION, SOLUTION INTRAVENOUS; SUBCUTANEOUS at 12:10

## 2022-10-26 RX ADMIN — PANTOPRAZOLE SODIUM 40 MG: 40 INJECTION, POWDER, FOR SOLUTION INTRAVENOUS at 08:10

## 2022-10-26 RX ADMIN — AZITHROMYCIN 250 MG: 500 INJECTION, POWDER, LYOPHILIZED, FOR SOLUTION INTRAVENOUS at 08:10

## 2022-10-26 RX ADMIN — MIDAZOLAM HYDROCHLORIDE 2 MG: 1 INJECTION, SOLUTION INTRAMUSCULAR; INTRAVENOUS at 10:10

## 2022-10-26 RX ADMIN — NOREPINEPHRINE BITARTRATE 16 MCG: 1 INJECTION, SOLUTION, CONCENTRATE INTRAVENOUS at 10:10

## 2022-10-26 RX ADMIN — Medication 30 MG: at 10:10

## 2022-10-26 RX ADMIN — SUCRALFATE 1 G: 1 SUSPENSION ORAL at 12:10

## 2022-10-26 RX ADMIN — SODIUM CHLORIDE: 0.9 INJECTION, SOLUTION INTRAVENOUS at 10:10

## 2022-10-26 RX ADMIN — LEVOTHYROXINE SODIUM 25 MCG: 25 TABLET ORAL at 05:10

## 2022-10-26 RX ADMIN — CALCIUM GLUCONATE 2 G: 20 INJECTION, SOLUTION INTRAVENOUS at 03:10

## 2022-10-26 RX ADMIN — Medication 20 MG: at 10:10

## 2022-10-26 RX ADMIN — FENTANYL CITRATE 50 MCG: 50 INJECTION, SOLUTION INTRAMUSCULAR; INTRAVENOUS at 10:10

## 2022-10-26 NOTE — SUBJECTIVE & OBJECTIVE
Interval History/Significant Events: Trialysis catheter placed overnight with plans for plasmapheresis early this AM. Pt tolerated procedure well despite multiple attempts to place line. Went for IR  Renal biopsy this AM. Tolerated procedure well. Endorsing burning at szymanski insertion site. Continuing Azithromycin and Cefepime. Rheumatology planning to start rituximab and cyclophosphamide. 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    Review of Systems   Unable to perform ROS: Mental status change   Genitourinary:  Positive for dysuria.   Objective:     Vital Signs (Most Recent):  Temp: 98 °F (36.7 °C) (10/26/22 1630)  Pulse: 74 (10/26/22 1700)  Resp: 14 (10/26/22 1721)  BP: 134/62 (10/26/22 1700)  SpO2: 96 % (10/26/22 1700) Vital Signs (24h Range):  Temp:  [97.6 °F (36.4 °C)-98.1 °F (36.7 °C)] 98 °F (36.7 °C)  Pulse:  [59-91] 74  Resp:  [14-37] 14  SpO2:  [90 %-100 %] 96 %  BP: (102-162)/(54-75) 134/62   Weight: 63.5 kg (139 lb 15.9 oz)  Body mass index is 26.45 kg/m².      Intake/Output Summary (Last 24 hours) at 10/26/2022 1754  Last data filed at 10/26/2022 1630  Gross per 24 hour   Intake 3344.65 ml   Output 3696 ml   Net -351.35 ml       Physical Exam  Vitals and nursing note reviewed.   Constitutional:       General: She is not in acute distress.     Appearance: She is well-developed. She is ill-appearing.   HENT:      Head: Normocephalic and atraumatic.   Neck:      Comments: LIJ trialysis  Cardiovascular:      Rate and Rhythm: Normal rate and regular rhythm.      Heart sounds: Normal heart sounds.   Pulmonary:      Effort: Pulmonary effort is normal. No respiratory distress.      Breath sounds: Rales present. No wheezing.   Abdominal:      General: Bowel sounds are normal. There is no distension.      Palpations: Abdomen is soft.      Tenderness: There is no abdominal tenderness.    Musculoskeletal:         General: No tenderness. Normal range of motion.      Cervical back: Normal range of motion and neck supple.      Right lower leg: No edema.      Left lower leg: No edema.   Lymphadenopathy:      Cervical: No cervical adenopathy.   Skin:     General: Skin is warm and dry.      Capillary Refill: Capillary refill takes less than 2 seconds.      Findings: No rash.   Neurological:      General: No focal deficit present.      Mental Status: She is oriented to person, place, and time. She is lethargic and confused.   Psychiatric:         Mood and Affect: Mood normal.         Behavior: Behavior normal.       Vents:  Oxygen Concentration (%): 50 (10/24/22 2200)  Lines/Drains/Airways       Central Venous Catheter Line  Duration             Trialysis (Dialysis) Catheter 10/25/22 2329 left internal jugular <1 day              Drain  Duration                  Urethral Catheter 10/23/22 2230 16 Fr. 2 days              Peripheral Intravenous Line  Duration                  Midline Catheter Insertion/Assessment  - Single Lumen 10/18/22 1831 Left brachial vein 18g x 10cm 7 days                  Significant Labs:    CBC/Anemia Profile:  Recent Labs   Lab 10/25/22  0325 10/25/22  1557 10/26/22  0249   WBC 43.48* 42.24* 38.52*   HGB 8.9* 7.9* 7.3*   HCT 25.0* 23.1* 21.8*   * 600* 562*   MCV 80* 81* 83   RDW 13.8 14.2 14.5        Chemistries:  Recent Labs   Lab 10/25/22  0325 10/25/22  1416 10/26/22  0249 10/26/22  1233   * 134* 135* 136   K 4.1 4.1 4.2 4.3    103 106 105   CO2 16* 15* 16* 15*   * 140* 154* 172*   CREATININE 4.4* 5.1* 5.7* 6.0*   CALCIUM 8.8 8.5* 8.3* 8.2*   ALBUMIN 1.8* 1.8* 1.8*  --    PROT 7.3 7.1 6.4  --    BILITOT 2.1* 2.0* 1.7*  --    ALKPHOS 160* 180* 136*  --    * 113* 85*  --    * 97* 57*  --    MG 2.8*  --  3.0* 3.2*   PHOS 4.7*  --  6.3*  --        CMP:   Recent Labs   Lab 10/25/22  0325 10/25/22  1416 10/26/22  0249 10/26/22  1233   *  134* 135* 136   K 4.1 4.1 4.2 4.3    103 106 105   CO2 16* 15* 16* 15*   * 187* 171* 181*   * 140* 154* 172*   CREATININE 4.4* 5.1* 5.7* 6.0*   CALCIUM 8.8 8.5* 8.3* 8.2*   PROT 7.3 7.1 6.4  --    ALBUMIN 1.8* 1.8* 1.8*  --    BILITOT 2.1* 2.0* 1.7*  --    ALKPHOS 160* 180* 136*  --    * 97* 57*  --    * 113* 85*  --    ANIONGAP 12 16 13 16     All pertinent labs within the past 24 hours have been reviewed.    Significant Imaging:  I have reviewed all pertinent imaging results/findings within the past 24 hours.

## 2022-10-26 NOTE — SUBJECTIVE & OBJECTIVE
Interval History: Patient on 8LNC this morning, agitated overnight so placed on precedex gtt. Renal function worsening. S/p kidney bx today. Plan for Plex today.     Review of patient's allergies indicates:   Allergen Reactions    Ampicillin     Peaches [peach (prunus persica)] Other (See Comments)     Pt unable to state type of reaction. Information obtained from daughter who states she was informed of allergy from patient.    Penicillins      Other reaction(s): Hives    Sulfa (sulfonamide antibiotics) Rash and Hives     Current Facility-Administered Medications   Medication Frequency    0.9%  NaCl infusion (for blood administration) Q24H PRN    0.9%  NaCl infusion (for blood administration) Q24H PRN    acetaminophen tablet 650 mg Q4H PRN    albuterol-ipratropium 2.5 mg-0.5 mg/3 mL nebulizer solution 3 mL Q4H PRN    aluminum-magnesium hydroxide-simethicone 200-200-20 mg/5 mL suspension 30 mL QID PRN    amLODIPine tablet 10 mg Daily    azithromycin (ZITHROMAX) 250 mg in dextrose 5 % 250 mL IVPB Q24H    bisacodyL suppository 10 mg Daily PRN    calcium gluconate 1 g in NS IVPB (premixed) Once    cefepime in dextrose 5 % 1 gram/50 mL IVPB 1 g Q24H    cloNIDine tablet 0.2 mg Q4H PRN    dexmedetomidine (PRECEDEX) 400mcg/100mL 0.9% NaCL infusion Continuous    dextrose 10% bolus 125 mL PRN    dextrose 10% bolus 250 mL PRN    diphenhydrAMINE injection 50 mg Once    fentaNYL (SUBLIMAZE) 50 mcg/mL injection     glucagon (human recombinant) injection 1 mg PRN    glucose chewable tablet 16 g PRN    glucose chewable tablet 24 g PRN    heparin (porcine) injection 3,000 Units Once    hydrALAZINE injection 10 mg Q8H PRN    insulin aspart U-100 pen 1-10 Units Q6H PRN    levothyroxine tablet 25 mcg Daily    melatonin tablet 6 mg Nightly PRN    methocarbamoL tablet 500 mg TID PRN    metoprolol succinate (TOPROL-XL) 24 hr tablet 100 mg Daily    midazolam (VERSED) 1 mg/mL injection     naloxone 0.4 mg/mL injection 0.02 mg PRN    omega-3  acid ethyl esters capsule 1 g Daily    ondansetron disintegrating tablet 8 mg Q8H PRN    pantoprazole injection 40 mg Daily    phenylephrine HCl in 0.9% NaCl 1 mg/10 mL (100 mcg/mL) syringe     [START ON 10/27/2022] predniSONE tablet 60 mg Daily    prochlorperazine injection Soln 5 mg Q6H PRN    simethicone chewable tablet 80 mg QID PRN    sodium bicarbonate tablet 650 mg BID    sodium chloride 0.9% flush 5 mL PRN    sucralfate 100 mg/mL suspension 1 g Q6H    sugammadex (BRIDION) 100 mg/mL injection      Facility-Administered Medications Ordered in Other Encounters   Medication Frequency    celecoxib capsule 400 mg Once    fentaNYL 50 mcg/mL injection  mcg PRN    LIDOcaine (PF) 10 mg/ml (1%) injection 10 mg Once PRN    LIDOcaine (PF) 10 mg/ml (1%) injection 10 mg Once    midazolam (VERSED) 1 mg/mL injection 0.5-4 mg PRN    ropivacaine 0.2% [facility] PainPRO Pump infusion 500 ML Continuous       Objective:     Vital Signs (Most Recent):  Temp: 97.9 °F (36.6 °C) (10/26/22 1200)  Pulse: (!) 59 (10/26/22 1300)  Resp: 16 (10/26/22 1300)  BP: (!) 117/59 (10/26/22 1300)  SpO2: (!) 94 % (10/26/22 1300)  O2 Device (Oxygen Therapy): High Flow nasal Cannula (10/26/22 1300)   Vital Signs (24h Range):  Temp:  [97.6 °F (36.4 °C)-98.1 °F (36.7 °C)] 97.9 °F (36.6 °C)  Pulse:  [59-83] 59  Resp:  [14-33] 16  SpO2:  [89 %-100 %] 94 %  BP: (102-162)/(51-74) 117/59     Weight: 63.5 kg (139 lb 15.9 oz) (10/17/22 1633)  Body mass index is 26.45 kg/m².  Body surface area is 1.65 meters squared.    I/O last 3 completed shifts:  In: 480 [P.O.:480]  Out: 1332 [Urine:1332]    Physical Exam  Vitals and nursing note reviewed.   Constitutional:       General: She is not in acute distress.     Appearance: She is well-developed. She is ill-appearing.      Comments: Patient somnolent   HENT:      Head: Normocephalic and atraumatic.   Cardiovascular:      Rate and Rhythm: Normal rate and regular rhythm.      Heart sounds: Normal heart sounds.    Pulmonary:      Effort: Pulmonary effort is normal. No respiratory distress.      Breath sounds: Rales present. No wheezing.   Abdominal:      General: Bowel sounds are normal. There is no distension.      Palpations: Abdomen is soft.      Tenderness: There is no abdominal tenderness.   Musculoskeletal:         General: No tenderness. Normal range of motion.      Cervical back: Normal range of motion and neck supple.      Right lower leg: No edema.      Left lower leg: No edema.   Lymphadenopathy:      Cervical: No cervical adenopathy.   Skin:     General: Skin is warm and dry.      Capillary Refill: Capillary refill takes less than 2 seconds.      Findings: No rash.   Neurological:      General: No focal deficit present.      Mental Status: She is oriented to person, place, and time.   Psychiatric:      Comments: Patient somnolent, arouses to sternal rub, does not answer questions       Significant Labs:  CBC:   Recent Labs   Lab 10/26/22  0249   WBC 38.52*   RBC 2.62*   HGB 7.3*   HCT 21.8*   *   MCV 83   MCH 27.9   MCHC 33.5     CMP:   Recent Labs   Lab 10/26/22  0249 10/26/22  1233   * 181*   CALCIUM 8.3* 8.2*   ALBUMIN 1.8*  --    PROT 6.4  --    * 136   K 4.2 4.3   CO2 16* 15*    105   * 172*   CREATININE 5.7* 6.0*   ALKPHOS 136*  --    ALT 85*  --    AST 57*  --    BILITOT 1.7*  --      All labs within the past 24 hours have been reviewed.     Significant Imaging:

## 2022-10-26 NOTE — ANESTHESIA POSTPROCEDURE EVALUATION
Anesthesia Post Evaluation    Patient: Kristin Goodman    Procedure(s) Performed: * No procedures listed *    Final Anesthesia Type: general      Patient location during evaluation: ICU  Patient participation: Yes- Able to Participate  Level of consciousness: lethargic  Post-procedure vital signs: reviewed and stable  Pain management: adequate  Airway patency: patent  DONAVAN mitigation strategies: Multimodal analgesia  PONV status at discharge: No PONV  Anesthetic complications: no      Cardiovascular status: blood pressure returned to baseline and hemodynamically stable  Respiratory status: nasal cannula  Hydration status: euvolemic  Follow-up not needed.          Vitals Value Taken Time   /55 10/26/22 1216   Temp  10/26/22 1220   Pulse 65 10/26/22 1219   Resp 18 10/26/22 1219   SpO2 100 % 10/26/22 1219   Vitals shown include unvalidated device data.      No case tracking events are documented in the log.      Pain/Kathryn Score: Pain Rating Prior to Med Admin: 7 (10/26/2022  6:36 AM)

## 2022-10-26 NOTE — ASSESSMENT & PLAN NOTE
-- Continue home medications as tolerated  -- pt not tolerating PO medications, prn hydralazine ordered

## 2022-10-26 NOTE — SUBJECTIVE & OBJECTIVE
Interval History: Hemoglobin decreasing. Kidney function getting worse. She got a kidney biopsy today. Starting PLEX today.    Current Facility-Administered Medications   Medication Frequency    0.9%  NaCl infusion (for blood administration) Q24H PRN    0.9%  NaCl infusion (for blood administration) Q24H PRN    acetaminophen tablet 650 mg Q4H PRN    albuterol-ipratropium 2.5 mg-0.5 mg/3 mL nebulizer solution 3 mL Q4H PRN    aluminum-magnesium hydroxide-simethicone 200-200-20 mg/5 mL suspension 30 mL QID PRN    amLODIPine tablet 10 mg Daily    azithromycin (ZITHROMAX) 250 mg in dextrose 5 % 250 mL IVPB Q24H    bisacodyL suppository 10 mg Daily PRN    calcium gluconate 1 g in NS IVPB (premixed) Once    cefepime in dextrose 5 % 1 gram/50 mL IVPB 1 g Q24H    cloNIDine tablet 0.2 mg Q4H PRN    dexmedetomidine (PRECEDEX) 400mcg/100mL 0.9% NaCL infusion Continuous    dextrose 10% bolus 125 mL PRN    dextrose 10% bolus 250 mL PRN    diphenhydrAMINE injection 50 mg Once    fentaNYL (SUBLIMAZE) 50 mcg/mL injection     glucagon (human recombinant) injection 1 mg PRN    glucose chewable tablet 16 g PRN    glucose chewable tablet 24 g PRN    heparin (porcine) injection 3,000 Units Once    hydrALAZINE injection 10 mg Q8H PRN    insulin aspart U-100 pen 1-10 Units Q6H PRN    levothyroxine tablet 25 mcg Daily    melatonin tablet 6 mg Nightly PRN    methocarbamoL tablet 500 mg TID PRN    metoprolol succinate (TOPROL-XL) 24 hr tablet 100 mg Daily    midazolam (VERSED) 1 mg/mL injection     naloxone 0.4 mg/mL injection 0.02 mg PRN    omega-3 acid ethyl esters capsule 1 g Daily    ondansetron disintegrating tablet 8 mg Q8H PRN    pantoprazole injection 40 mg Daily    phenylephrine HCl in 0.9% NaCl 1 mg/10 mL (100 mcg/mL) syringe     [START ON 10/27/2022] predniSONE tablet 60 mg Daily    prochlorperazine injection Soln 5 mg Q6H PRN    simethicone chewable tablet 80 mg QID PRN    sodium bicarbonate tablet 650 mg BID    sodium chloride  0.9% flush 5 mL PRN    sucralfate 100 mg/mL suspension 1 g Q6H    sugammadex (BRIDION) 100 mg/mL injection      Facility-Administered Medications Ordered in Other Encounters   Medication Frequency    celecoxib capsule 400 mg Once    fentaNYL 50 mcg/mL injection  mcg PRN    LIDOcaine (PF) 10 mg/ml (1%) injection 10 mg Once PRN    LIDOcaine (PF) 10 mg/ml (1%) injection 10 mg Once    midazolam (VERSED) 1 mg/mL injection 0.5-4 mg PRN    ropivacaine 0.2% [facility] PainPRO Pump infusion 500 ML Continuous     Objective:     Vital Signs (Most Recent):  Temp: 97.9 °F (36.6 °C) (10/26/22 1200)  Pulse: (!) 59 (10/26/22 1300)  Resp: 16 (10/26/22 1300)  BP: (!) 117/59 (10/26/22 1300)  SpO2: (!) 94 % (10/26/22 1300)  O2 Device (Oxygen Therapy): High Flow nasal Cannula (10/26/22 1300)   Vital Signs (24h Range):  Temp:  [97.6 °F (36.4 °C)-98.1 °F (36.7 °C)] 97.9 °F (36.6 °C)  Pulse:  [59-83] 59  Resp:  [14-33] 16  SpO2:  [89 %-100 %] 94 %  BP: (102-162)/(51-74) 117/59     Weight: 63.5 kg (139 lb 15.9 oz) (10/17/22 1633)  Body mass index is 26.45 kg/m².  Body surface area is 1.65 meters squared.      Intake/Output Summary (Last 24 hours) at 10/26/2022 1343  Last data filed at 10/26/2022 1111  Gross per 24 hour   Intake 440 ml   Output 746 ml   Net -306 ml       Physical Exam   Constitutional: She is oriented to person, place, and time. No distress.   Sedated after her biopsy   HENT:   Head: Normocephalic and atraumatic.   Eyes: Pupils are equal, round, and reactive to light.   Cardiovascular: Normal rate and regular rhythm.   No murmur heard.  Pulmonary/Chest: Effort normal and breath sounds normal. No respiratory distress. She has no wheezes.   Abdominal: Soft. Bowel sounds are normal. There is no abdominal tenderness.   Musculoskeletal:         General: No deformity. Normal range of motion.      Cervical back: Normal range of motion.   Neurological: She is oriented to person, place, and time.   Skin: Skin is warm and dry.    Psychiatric: Affect and judgment normal.     Significant Labs:  CBC:   Recent Labs   Lab 10/25/22  1557 10/26/22  0249   WBC 42.24* 38.52*   HGB 7.9* 7.3*   HCT 23.1* 21.8*   * 562*     CMP:   Recent Labs   Lab 10/26/22  0249 10/26/22  1233   * 181*   CALCIUM 8.3* 8.2*   ALBUMIN 1.8*  --    PROT 6.4  --    * 136   K 4.2 4.3   CO2 16* 15*    105   * 172*   CREATININE 5.7* 6.0*   ALKPHOS 136*  --    ALT 85*  --    AST 57*  --    BILITOT 1.7*  --        Significant Imaging:  Imaging results within the past 24 hours have been reviewed.

## 2022-10-26 NOTE — PROGRESS NOTES
"[facility] - Cardiac Medical ICU  Rheumatology  Progress Note    Patient Name: Kristin Goodman  MRN: [id number]  Admission Date: 10/17/2022  Hospital Length of Stay: 9 days  Code Status: Full Code   Attending Provider: Fox Holbrook MD  Primary Care Physician: Lori Hernandez MD  Principal Problem: Sepsis due to pneumonia    Subjective:     HPI: Patient is a 78 yo F with chronic diastolic heart failure, HTN, OA, who is admitted for respiratory failure. Rheumatology is consulted due to concern for vasculitis. Patient initially presented to the ED on 9/24/22 complaining of a week of cough followed by an episode of hemoptysis on 9/24 prompting the ED visit. They did a CTA which showed multifocal PNA. She was sent home with Levuin. She followed up with PCP on 10/4/22 and was feeling better. Then she presented to the ED again on 10/14 and on 10/17 complaining of dyspnea. She had fevers in the few days leading up to admission of 102. She endorsed weakness, productive cough, dyspnea, and loss of appetite. Pt initally at 88% O2 saturation and improved to 98% on 2L NC. She was admitted for IV antibiotics. CT chest with contrast on 10/17 showed evidence of interval progression of bilateral perihilar dense consolidation with peripheral patchy areas of ground-glass opacification nonspecific as to the etiology.  Bilateral pneumonia as well as inflammatory etiologies considered.  Cardiogenic pulmonary edema considered less likely given the pattern.    On 10/19/22 she started having melena. Hb dropped to 6. She got 2 units pRBCs. GI was consulted. They noted "Colonoscopy in 2020 showed internal hemorrhoids and mild sigmoid diverticulosis. EGD in 2012 showed gastritis, biopsies positive for H. Pylori." "We considered doing EGD now, but from a pulmonary standpoint I do not think she is stable enough with the current pneumonia, therefore would recommend that we just follow the patient, treat with a PPI in case there is any " "peptic ulcer disease."    On 10/21/22 ENT was consulted due to left sided otalgia the day prior which resolved. She also was complaining of cervical adenopathy and sore throat. They did not recommend surgical intervention and felt that adenopathy was likely reactive.     On 10/23/22 ID was consulted. They recommended checking respiratory infection panel and fungal markers.    On 10/23/22 Nephrology was consulted due to worsening creatinine. They noted, "Patient is a noted to have baseline creatinine of 1 as recent as 8/2022 but progressive worsening of creatinine in early October.  On admission patient with creatinine of 1.6 (10/17) with subsequent progression and creatinine of 3.0 at time of consultation (10/23).  Nephrology consulted for acute kidney injury." "Patient with ATN nephritic picture in urine sediment, prot/crea ratio pending. Had hematuria in recent past but in context with positive urine cultures. Now definitely more dysmorphic red cells that wbcs in the urine. 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    On 10/23/22, she was transferred to ICU due to desaturations and respiratory distress. Pulmonologist noted that her respiratory status is too tenuous for bronchoscopy. She was stabilized with non-invasive ventilation and nasal cannula oxygen.      Interval History: Hemoglobin decreasing. Kidney function getting worse. She got a kidney biopsy today. Starting PLEX today.    Current Facility-Administered Medications   Medication Frequency    0.9%  NaCl infusion (for blood administration) Q24H PRN    0.9%  NaCl infusion (for blood administration) Q24H PRN    acetaminophen tablet 650 mg Q4H PRN    albuterol-ipratropium 2.5 mg-0.5 mg/3 mL nebulizer solution 3 mL Q4H PRN    aluminum-magnesium hydroxide-simethicone 200-200-20 mg/5 mL suspension 30 mL QID PRN    amLODIPine tablet 10 mg " Daily    azithromycin (ZITHROMAX) 250 mg in dextrose 5 % 250 mL IVPB Q24H    bisacodyL suppository 10 mg Daily PRN    calcium gluconate 1 g in NS IVPB (premixed) Once    cefepime in dextrose 5 % 1 gram/50 mL IVPB 1 g Q24H    cloNIDine tablet 0.2 mg Q4H PRN    dexmedetomidine (PRECEDEX) 400mcg/100mL 0.9% NaCL infusion Continuous    dextrose 10% bolus 125 mL PRN    dextrose 10% bolus 250 mL PRN    diphenhydrAMINE injection 50 mg Once    fentaNYL (SUBLIMAZE) 50 mcg/mL injection     glucagon (human recombinant) injection 1 mg PRN    glucose chewable tablet 16 g PRN    glucose chewable tablet 24 g PRN    heparin (porcine) injection 3,000 Units Once    hydrALAZINE injection 10 mg Q8H PRN    insulin aspart U-100 pen 1-10 Units Q6H PRN    levothyroxine tablet 25 mcg Daily    melatonin tablet 6 mg Nightly PRN    methocarbamoL tablet 500 mg TID PRN    metoprolol succinate (TOPROL-XL) 24 hr tablet 100 mg Daily    midazolam (VERSED) 1 mg/mL injection     naloxone 0.4 mg/mL injection 0.02 mg PRN    omega-3 acid ethyl esters capsule 1 g Daily    ondansetron disintegrating tablet 8 mg Q8H PRN    pantoprazole injection 40 mg Daily    phenylephrine HCl in 0.9% NaCl 1 mg/10 mL (100 mcg/mL) syringe     [START ON 10/27/2022] predniSONE tablet 60 mg Daily    prochlorperazine injection Soln 5 mg Q6H PRN    simethicone chewable tablet 80 mg QID PRN    sodium bicarbonate tablet 650 mg BID    sodium chloride 0.9% flush 5 mL PRN    sucralfate 100 mg/mL suspension 1 g Q6H    sugammadex (BRIDION) 100 mg/mL injection      Facility-Administered Medications Ordered in Other Encounters   Medication Frequency    celecoxib capsule 400 mg Once    fentaNYL 50 mcg/mL injection  mcg PRN    LIDOcaine (PF) 10 mg/ml (1%) injection 10 mg Once PRN    LIDOcaine (PF) 10 mg/ml (1%) injection 10 mg Once    midazolam (VERSED) 1 mg/mL injection 0.5-4 mg PRN    ropivacaine 0.2% [facility] PainPRO Pump infusion 500 ML Continuous     Objective:     Vital Signs  (Most Recent):  Temp: 97.9 °F (36.6 °C) (10/26/22 1200)  Pulse: (!) 59 (10/26/22 1300)  Resp: 16 (10/26/22 1300)  BP: (!) 117/59 (10/26/22 1300)  SpO2: (!) 94 % (10/26/22 1300)  O2 Device (Oxygen Therapy): High Flow nasal Cannula (10/26/22 1300)   Vital Signs (24h Range):  Temp:  [97.6 °F (36.4 °C)-98.1 °F (36.7 °C)] 97.9 °F (36.6 °C)  Pulse:  [59-83] 59  Resp:  [14-33] 16  SpO2:  [89 %-100 %] 94 %  BP: (102-162)/(51-74) 117/59     Weight: 63.5 kg (139 lb 15.9 oz) (10/17/22 1633)  Body mass index is 26.45 kg/m².  Body surface area is 1.65 meters squared.      Intake/Output Summary (Last 24 hours) at 10/26/2022 1343  Last data filed at 10/26/2022 1111  Gross per 24 hour   Intake 440 ml   Output 746 ml   Net -306 ml       Physical Exam   Constitutional: She is oriented to person, place, and time. No distress.   Sedated after her biopsy   HENT:   Head: Normocephalic and atraumatic.   Eyes: Pupils are equal, round, and reactive to light.   Cardiovascular: Normal rate and regular rhythm.   No murmur heard.  Pulmonary/Chest: Effort normal and breath sounds normal. No respiratory distress. She has no wheezes.   Abdominal: Soft. Bowel sounds are normal. There is no abdominal tenderness.   Musculoskeletal:         General: No deformity. Normal range of motion.      Cervical back: Normal range of motion.   Neurological: She is oriented to person, place, and time.   Skin: Skin is warm and dry.   Psychiatric: Affect and judgment normal.     Significant Labs:  CBC:   Recent Labs   Lab 10/25/22  1557 10/26/22  0249   WBC 42.24* 38.52*   HGB 7.9* 7.3*   HCT 23.1* 21.8*   * 562*     CMP:   Recent Labs   Lab 10/26/22  0249 10/26/22  1233   * 181*   CALCIUM 8.3* 8.2*   ALBUMIN 1.8*  --    PROT 6.4  --    * 136   K 4.2 4.3   CO2 16* 15*    105   * 172*   CREATININE 5.7* 6.0*   ALKPHOS 136*  --    ALT 85*  --    AST 57*  --    BILITOT 1.7*  --        Significant Imaging:  Imaging results within the past  24 hours have been reviewed.    Assessment/Plan:     Acute hypoxemic respiratory failure  Patient is a 78 yo F with chronic diastolic heart failure, HTN, OA, who is admitted for respiratory failure. Rheumatology is consulted due to concern for vasculitis.    Patient presented with cough and hemoptysis since 9/24/22. She was admitted due to dyspnea and hypoxia on 10/17. She has had Hb drop to 6 this admission requiring blood transfusions. GI defers invasive workup for now because she is not stable enough. She has had increasing creatinine from baseline of 1 to 3.0 on 10/23/22. Nephrology concerned for ATN nephritic picture in urine sediment.    Reviewed her chest imaging which shows progressive disease from 10/14 until now which can be concerning for DAH. This might also explain the Hb drop. No bronch planned for now due to her tenuous respiratory status.    CBC: 17 WBCs, Hb 14 now (s/p transfusion), plt 495  CMP: creatinine 3.5, GFR 13. AST//164  UA: 1+ blood, 88 RBCs, 18 WBCs  UPCR 1.54  Complements normal  RF and CCP negative    MITUL+ 1:2560 homogenous, profile pending  PR3 slightly elevated at 1.2 (reference positive is 1.0)  MPO elevated at 132  GBM antibodies negative    Pending: ANCAs, cryoglobulins    Completed 3 doses IV Solumedrol 1000 mg. Now on PO prednisone 60 mg     Hb continues to decrease. Cr worsened to 5.7. Still on 7-8 L nasal cannula. PLEX starting today. Consents for rituximab and cyclophosphamide signed and scanned into the chart. Got kidney biopsy today. We initially thought she has granulomatosis with polyangiitis, but today MPO came back significantly elevated, so she now meets ACR/EULAR 2022 classification criteria for microscopic polyangiitis.      Recommendations:  Follow up Quantiferon and other infectious labs.  Follow up kidney biopsy  PO prednisone 60 mg daily.  Please start Bactrim-DS three times weekly or atovaquone 1500 mg daily while on high dose steroids.  Continue Protonix  "daily while on high dose steroids.  Will plan for Rituximab 375 mg/m^2 weekly for 4 doses. Cyclophosphamide 7.5 mg/kg added on weeks 1 and 3. Consent forms signed and scanned into chart (check Media tab and sort by "Date Uploaded").      Discussed with attending physician, Dr. Palacio. Attending attestation to follow.    Layne Aguirre MD  Rheumatology Fellow  PGY-5    I have personally taken the history and examined the patient to verify the fellow's notation, and I agree with the impression and plan stated.      She now has strongly positive MPO Ab (in contrast to borderline positive PR3), consistent with clinical picture of microscopic polyangiitis with pulmonary and renal involvement. Creatinine has continued to worsen and pulmonary status continues to require high flow O2.     As noted, she has received IV solumedrol, is starting plasmapheresis, and we will initiate rituximab and cyclophosphamide regimen in effort to induce remission and prevent ESRD.           Layne Aguirre MD  Rheumatology  [facility] - Cardiac Medical ICU  "

## 2022-10-26 NOTE — PROGRESS NOTES
Apheresis tx started at 1520 without difficulty. Pt oriented to self only.  2.5 Liter exchange.  Replacement fluids FFP, 25mg Benadryl, 2g Calcium. Tx ended at 1625.  Cath flushed and locked.  Pt tolerated tx well.  Next tx 10/27/2022  .

## 2022-10-26 NOTE — PLAN OF CARE
Post procedure report called to primary nurse, ia. Pt will be transferred back to inpt room via bed on [facility] w/ CRNA & RN

## 2022-10-26 NOTE — TRANSFER OF CARE
"Anesthesia Transfer of Care Note    Patient: Kristin Goodman    Procedure(s) Performed: * No procedures listed *    Patient location: ICU    Anesthesia Type: general    Transport from OR: Transported from OR on 2-3 L/min O2 by NC with adequate spontaneous ventilation. Continuous ECG monitoring in transport. Continuous SpO2 monitoring in transport    Post pain: adequate analgesia    Post assessment: no apparent anesthetic complications    Post vital signs: stable    Level of consciousness: sedated and responds to stimulation    Nausea/Vomiting: no nausea/vomiting    Complications: none    Transfer of care protocol handoff checklist not completed for medical reasons      Last vitals:   Visit Vitals  BP (!) 107/54   Pulse 65   Temp 36.6 °C (97.9 °F) (Axillary)   Resp (!) 26   Ht 5' 1" (1.549 m)   Wt 63.5 kg (139 lb 15.9 oz)   SpO2 100%   BMI 26.45 kg/m²     "

## 2022-10-26 NOTE — PROCEDURES
"Kristin Goodman is a 77 y.o. female patient.    Temp: 97.9 °F (36.6 °C) (10/25/22 1900)  Pulse: 72 (10/25/22 2100)  Resp: (!) 24 (10/25/22 2100)  BP: (!) 127/59 (10/25/22 2100)  SpO2: 95 % (10/25/22 2100)  Weight: 63.5 kg (139 lb 15.9 oz) (10/17/22 1633)  Height: 5' 1" (154.9 cm) (10/17/22 1633)       Central Line    Date/Time: 10/25/2022 10:29 PM  Performed by: Sanna Reeves NP  Authorized by: Sanna Reeves NP     Location procedure was performed:  [facility] CARDIAC MEDICAL ICU ([facility])  Pre-operative diagnosis:  Anca vasculitis  Post-operative diagnosis:  Anca vasculitis  Consent Done ?:  Yes  Time out complete?: Verified correct patient, procedure, equipment, staff, and site/side    Indications:  Vascular access  Anesthesia:  Local infiltration  Local anesthetic:  Lidocaine 1% without epinephrine  Anesthetic total (ml):  2  Preparation:  Skin prepped with ChloraPrep  Skin prep agent dried: Skin prep agent completely dried prior to procedure    Sterile barriers: All five maximal sterile barriers used - gloves, gown, cap, mask and large sterile sheet    Hand hygiene: Hand hygiene performed immediately prior to central venous catheter insertion    Location:  Right internal jugular  Catheter type:  Trialysis  Catheter size:  12 Fr  Inserted Catheter Length (cm):  15  Ultrasound guidance: Yes    Vessel Caliber:  Medium   patent  Comprressibility:  Normal  Doppler:  Not done  Needle advanced into vessel with real time ultrasound guidance.    Guidewire confirmed in vessel.    Steril sheath on probe.    Sterile gel used.  Manometry: Yes    Number of attempts:  1  Securement:  Line sutured, chlorhexidine patch, sterile dressing applied and blood return through all ports  Technical Procedures Used:  Micro puncture kt  Complications: No    Estimated blood loss (mL):  1  Specimens: No    Implants: No    XRay:  No pneumothorax on x-ray  Adverse Events:  Other  Other Complications:  Catheter malplacement with tip in " subclavian/axillary vein. Will remove.    catheter malplacement with tip in subclavian/axillary vein. Will remove. Termination Site: subclavian    Sanna Reeves NP  Critical Care Medicine  10/25/2022

## 2022-10-26 NOTE — PLAN OF CARE
CM[facility] DAILY GOALS       A: Awake    RASS: Goal -    Actual - RASS ([place] Agitation-Sedation Scale): 3-->very agitated   Restraint necessity:    B: Breathe   SBT: NA   C: Coordinate A & B, analgesics/sedatives   Pain: managed    SAT: NA  D: Delirium   CAM-ICU: Overall CAM-ICU: Positive  E: Early(intubated/ Progressive (non-intubated) Mobility   MOVE Screen: Fail   Activity: Activity Management: Patient unable to perform activities  FAS: Feeding/Nutrition   Diet order: Diet/Nutrition Received: NPO, Specialty Diet/Nutrition Received: renal diet  T: Thrombus   DVT prophylaxis: VTE Required Core Measure: Provider determined low risk VTE  H: HOB Elevation   Head of Bed (HOB) Positioning: HOB elevated  U: Ulcer Prophylaxis   GI: yes  G: Glucose control   managed Glycemic Management: blood glucose monitored  S: Skin   Bathing/Skin Care: bath, complete, dressed/undressed, linen changed, incontinence care, electrode patches/site rotation  Device Skin Pressure Protection: absorbent pad utilized/changed, pressure points protected  Pressure Reduction Devices: pressure-redistributing mattress utilized  Pressure Reduction Techniques: weight shift assistance provided  Skin Protection: adhesive use limited  B: Bowel Function   no issues   I: Indwelling Catheters   Curry necessity:      Urethral Catheter 10/23/22 2230 16 Fr.-Reason for Continuing Urinary Catheterization: Critically ill in ICU and requiring hourly monitoring of intake/output   CVC necessity: Yes  D: De-escalation Antibiotics   Yes    Family/Goals of care/Code Status   Code Status: Full Code    24H Vital Sign Range  Temp:  [97.6 °F (36.4 °C)-98.1 °F (36.7 °C)]   Pulse:  [59-83]   Resp:  [14-33]   BP: (102-162)/(54-74)   SpO2:  [89 %-100 %]      Shift Events   Biopsy of right kidney completed, PLEX in session    VS and assessment per flow sheet, patient progressing towards goals as tolerated, plan of care reviewed with family, all concerns addressed, will continue to  monitor.    [place]

## 2022-10-26 NOTE — CONSULTS
[facility] - Cardiac Medical ICU  Transfusion Medicine  Consult Note    Patient Name: Kristin Goodman  MRN: [id number]  Admission Date: 10/17/2022  Hospital Length of Stay: 9 days  Attending Physician: Fox Holbrook MD  Primary Care Provider: Lori Hernandez MD     Inpatient consult to [facility]  Consult performed by: Francisco J Jackson MD  Consult ordered by: Magui Cage MD  Reason for consult: ANCA-associated vasculitis with DAH  Assessment/Recommendations: TPE x 7 over a period of 14 days      Subjective:     Principal Problem:Sepsis due to pneumonia    History of Present Illness: HPI: Ms. Goodman is a  78 yo F with chronic diastolic heart failure, HTN and recent ED visit on 9/24 for PNA with complaints of cough x 1 week and hemoptysis x 1 on 9/24 who returned on 10/14 and 0/17 with complaints of dyspnea. She was subsequently admitted after her last ED visit for respiratory failure with 88% oxygen saturation. CT chest with contrast on 10/17 showed evidence of interval progression of bilateral perihilar dense consolidation with peripheral patchy areas of ground-glass opacification nonspecific as to the etiology.  Worsening pneumonia as well as inflammatory etiologies were considered. On admission patient's creatinine at 1.6 (10/17) with worsening progression (currently @ 5.7). 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 Thus far, patient has received pulse steroids with future plans for Rituximab and Cyclophosphamide. Transfusion Medicine consulted for apheresis support.      PMH and PSH reviewed 10/26/2022 and relevant items addressed in HPI.    Review of patient's allergies indicates:   Allergen Reactions    Ampicillin     Peaches [peach (prunus persica)] Other (See Comments)     Pt unable to state type of reaction. Information obtained from daughter who states she was informed of allergy from patient.     Penicillins      Other reaction(s): Hives    Sulfa (sulfonamide antibiotics) Rash and Hives       All medications reviewed 10/26/2022 and ace inhibitors not identified.    Current Facility-Administered Medications:     0.9%  NaCl infusion (for blood administration), , Intravenous, Q24H PRN, Madie Lancaster MD    0.9%  NaCl infusion (for blood administration), , Intravenous, Q24H PRN, Madie Lancaster MD    acetaminophen tablet 650 mg, 650 mg, Oral, Q4H PRN, Magui Cage MD    albuterol-ipratropium 2.5 mg-0.5 mg/3 mL nebulizer solution 3 mL, 3 mL, Nebulization, Q4H PRN, Kandace Smith PA-C, 3 mL at 10/23/22 2206    aluminum-magnesium hydroxide-simethicone 200-200-20 mg/5 mL suspension 30 mL, 30 mL, Oral, QID PRN, Kandace Smith PA-C    amLODIPine tablet 10 mg, 10 mg, Oral, Daily, Madie Lancaster MD, 10 mg at 10/25/22 0819    azithromycin (ZITHROMAX) 250 mg in dextrose 5 % 250 mL IVPB, 250 mg, Intravenous, Q24H, Magui Cage MD, Stopped at 10/26/22 0905    bisacodyL suppository 10 mg, 10 mg, Rectal, Daily PRN, Kandace Smith PA-C    calcium gluconate 1 g in NS IVPB (premixed), 2 g, Intravenous, Once, Francisco J Jackson MD, Last Rate: 100 mL/hr at 10/26/22 1551, 2 g at 10/26/22 1551    cefepime in dextrose 5 % 1 gram/50 mL IVPB 1 g, 1 g, Intravenous, Q24H, Magui Cage MD, Stopped at 10/26/22 1158    cloNIDine tablet 0.2 mg, 0.2 mg, Oral, Q4H PRN, Madie Lancaster MD, 0.2 mg at 10/23/22 0053    dexmedetomidine (PRECEDEX) 400mcg/100mL 0.9% NaCL infusion, 0-1.4 mcg/kg/hr, Intravenous, Continuous, Magui Cage MD, Stopped at 10/26/22 1026    dextrose 10% bolus 125 mL, 12.5 g, Intravenous, PRN, Immanuel Peres MD    dextrose 10% bolus 250 mL, 25 g, Intravenous, PRN, Immanuel Peres MD    fentaNYL (SUBLIMAZE) 50 mcg/mL injection, , , ,     glucagon (human recombinant) injection 1 mg, 1 mg, Intramuscular, PRN, Magui Cage MD    glucose chewable tablet 16  g, 16 g, Oral, PRN, Kandace Smith PA-C    glucose chewable tablet 24 g, 24 g, Oral, PRN, Kandace Smith PA-C    hydrALAZINE injection 10 mg, 10 mg, Intravenous, Q8H PRN, Magui Cage MD    insulin aspart U-100 pen 1-10 Units, 1-10 Units, Subcutaneous, Q6H PRN, Magui Cage MD, 4 Units at 10/26/22 0600    levothyroxine tablet 25 mcg, 25 mcg, Oral, Daily, Kandace Smith PA-C, 25 mcg at 10/26/22 0555    melatonin tablet 6 mg, 6 mg, Oral, Nightly PRN, Kandace Smith PA-C, 6 mg at 10/18/22 0235    methocarbamoL tablet 500 mg, 500 mg, Oral, TID PRN, Kandace Smith PA-C, 500 mg at 10/26/22 0628    metoprolol succinate (TOPROL-XL) 24 hr tablet 100 mg, 100 mg, Oral, Daily, Michelle Ocasio MD, 100 mg at 10/25/22 0819    midazolam (VERSED) 1 mg/mL injection, , , ,     naloxone 0.4 mg/mL injection 0.02 mg, 0.02 mg, Intravenous, PRN, Kandace Smith PA-C    omega-3 acid ethyl esters capsule 1 g, 1 g, Oral, Daily, Fox Holbrook MD, 1 g at 10/25/22 0825    ondansetron disintegrating tablet 8 mg, 8 mg, Oral, Q8H PRN, Kandace Smith PA-C, 8 mg at 10/23/22 2321    pantoprazole injection 40 mg, 40 mg, Intravenous, Daily, Magui Cage MD, 40 mg at 10/26/22 0824    phenylephrine HCl in 0.9% NaCl 1 mg/10 mL (100 mcg/mL) syringe, , , ,     prochlorperazine injection Soln 5 mg, 5 mg, Intravenous, Q6H PRN, Kandace Smith PA-C, 5 mg at 10/24/22 0001    simethicone chewable tablet 80 mg, 1 tablet, Oral, QID PRN, Kandace Smith PA-C, 80 mg at 10/22/22 1133    sodium bicarbonate tablet 650 mg, 650 mg, Oral, BID, Magui Cage MD, 650 mg at 10/25/22 2140    sodium chloride 0.9% flush 5 mL, 5 mL, Intravenous, PRN, Kandace Smith PA-C    sucralfate 100 mg/mL suspension 1 g, 1 g, Oral, Q6H, Madie Lancaster MD, 1 g at 10/26/22 0556    sugammadex (BRIDION) 100 mg/mL injection, , , ,     Facility-Administered Medications Ordered in Other Encounters:     celecoxib capsule 400 mg, 400 mg, Oral,  Once, Dieudonne Marshall MD    fentaNYL 50 mcg/mL injection  mcg,  mcg, Intravenous, PRN, Dieudonne Marshall MD, 100 mcg at 12/21/21 0919    LIDOcaine (PF) 10 mg/ml (1%) injection 10 mg, 1 mL, Intradermal, Once PRN, Dieudonne Marshall MD    LIDOcaine (PF) 10 mg/ml (1%) injection 10 mg, 1 mL, Intradermal, Once, Dieudonne Marshall MD    midazolam (VERSED) 1 mg/mL injection 0.5-4 mg, 0.5-4 mg, Intravenous, PRN, Dieudonne Marshall MD, 2 mg at 12/21/21 0919    ropivacaine 0.2% [facility] PainPRO Pump infusion 500 ML, , Perineural, Continuous, Dieudonne Marshall MD, New Bag at 12/21/21 1153     Family History       Problem Relation (Age of Onset)    Alzheimer's disease Sister    Arthritis Mother, Sister, Brother    Breast cancer Other    Cancer Sister, Brother    Diabetes Father    Early death Mother (56), Father (62), Sister (63), Brother (59)    Heart disease Sister, Brother    Hyperlipidemia Sister    Hypertension Mother, Father, Sister, Brother, Daughter    Prostate cancer Brother    Rheum arthritis Sister    Stroke Father    Vision loss Brother          Tobacco Use    Smoking status: Former     Packs/day: 0.50     Years: 6.00     Pack years: 3.00     Types: Cigarettes    Smokeless tobacco: Never    Tobacco comments:     Quit ~ 30 years ago   Substance and Sexual Activity    Alcohol use: No    Drug use: No    Sexual activity: Never     Partners: Male     Review of Systems   Unable to perform ROS: Other   Objective:     Vital Signs (Most Recent):  Temp: 97.9 °F (36.6 °C) (10/26/22 0700)  Pulse: 65 (10/26/22 1000)  Resp: (!) 26 (10/26/22 1000)  BP: (!) 107/54 (10/26/22 1000)  SpO2: 100 % (10/26/22 1000)   Vital Signs (24h Range):  Temp:  [96.9 °F (36.1 °C)-98.1 °F (36.7 °C)] 97.9 °F (36.6 °C)  Pulse:  [62-83] 65  Resp:  [13-33] 26  SpO2:  [89 %-100 %] 100 %  BP: (102-162)/(51-74) 107/54     Weight: 63.5 kg (139 lb 15.9 oz)    Physical Exam  Vitals and nursing note reviewed.       Significant Labs:  CBC:   Recent Labs   Lab 10/25/22  0325 10/25/22  1557 10/26/22  0249   WBC 43.48* 42.24* 38.52*   HGB 8.9* 7.9* 7.3*   HCT 25.0* 23.1* 21.8*   * 600* 562*     CMP:   Recent Labs   Lab 10/25/22  0325 10/25/22  1416 10/26/22  0249 10/26/22  1233   * 134* 135* 136   K 4.1 4.1 4.2 4.3    103 106 105   CO2 16* 15* 16* 15*   * 187* 171* 181*   * 140* 154* 172*   CREATININE 4.4* 5.1* 5.7* 6.0*   CALCIUM 8.8 8.5* 8.3* 8.2*   PROT 7.3 7.1 6.4  --    ALBUMIN 1.8* 1.8* 1.8*  --    BILITOT 2.1* 2.0* 1.7*  --    ALKPHOS 160* 180* 136*  --    * 97* 57*  --    * 113* 85*  --    ANIONGAP 12 16 13 16     Coagulation: No results for input(s): PT, INR, APTT in the last 48 hours.    Assessment/Plan:     Vasculitis, ANCA-associated with DAH carries a Category I Grade 1A indication for therapeutic apheresis via the 2016 Journal of Clinical Apheresis Guidelines (Lewis J et al. Journal of Clinical Apheresis 2016; 31:149-162.) The TPE plan is as follows:     Access: Dialysis-compatible catheter  Number of Procedures: 7 treatments over 14-day period  Schedule: Wed, Thurs, Fri, Sun, Tues, Wed, Fri  Volume: 2.5 L  Replacement Fluid: First 3 treatments, 100% FFP; remaining treatments 50/50 FFP/albumin  Pertinent Labs: CBC, CMP    Active Diagnoses:    Diagnosis Date Noted POA    PRINCIPAL PROBLEM:  Sepsis due to pneumonia [J18.9, A41.9] 10/17/2022 Yes    Acute hypoxemic respiratory failure [J96.01] 10/23/2022 Unknown    Lymphadenopathy of head and neck [R59.1] 10/21/2022 Yes    Cervical adenopathy [R59.0] 10/21/2022 No    Hyponatremia [E87.1] 10/20/2022 Yes    Acute blood loss anemia [D62] 10/19/2022 Yes    Acute renal failure superimposed on stage 3 chronic kidney disease [N17.9, N18.30] 10/18/2022 Yes    Multifocal pneumonia [J18.9] 10/17/2022 Yes    Chronic diastolic heart failure [I50.32] 08/31/2020 Yes    Pulmonary HTN [I27.20] 08/31/2020 Yes    CKD (chronic kidney disease), stage III  [N18.30] 04/27/2019 Yes    Elevated CPK [R74.8] 04/27/2019 Yes     Chronic    HTN (hypertension) [I10]  Yes    Hypothyroid [E03.9]  Yes      Problems Resolved During this Admission:       Francisco J Jackson M.D., M.S., [place] [facility]  Medical Director  Section of Transfusion Medicine & Blood Donor Services  Department of Pathology and Laboratory Medicine  [facility]  [phone] (Blood Bank)  10/26/2022    Francisco J Jackson MD  Transfusion Medicine  [facility] - Cardiac Medical ICU

## 2022-10-26 NOTE — ANESTHESIA PREPROCEDURE EVALUATION
10/26/2022  Pre-operative evaluation for * No procedures listed *    Kristin Goodman is a 77 y.o. 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 On 10L O2, worsening mental status     TTE 10/2022   The left ventricle is normal in size with normal systolic function. The estimated ejection fraction is 65%.   Normal right ventricular size with normal right ventricular systolic function.   Grade I left ventricular diastolic dysfunction.   Mild tricuspid regurgitation.   There is pulmonary hypertension. The estimated PA systolic pressure is 56 mmHg.   Normal to low central venous pressure (3 mmHg).         Patient Active Problem List   Diagnosis    Hypothyroid    HTN (hypertension)    Trochanteric bursitis of right hip    Mitral valve regurgitation    Chest pain    CKD (chronic kidney disease), stage III    Elevated CPK    Syncope    Inflammatory spondylopathy    CAREY (dyspnea on exertion)    Pulmonary HTN    Chronic diastolic heart failure    Colon cancer screening    DONAVAN (obstructive sleep apnea)    Sleep arousal disorder    Acute medial meniscal tear, right, initial encounter    Decreased range of motion (ROM) of right knee    Quadriceps weakness    Weakness of right hip    Impaired mobility and ADLs    Acute medial meniscal injury of right knee    S/P meniscectomy    Atherosclerosis of renal artery    Pain in both wrists    Decreased range of motion of both wrists    Decreased  strength    Alteration in instrumental activities of daily living (IADL)    Sepsis due to pneumonia    Multifocal pneumonia    Acute renal failure superimposed on stage 3 chronic kidney disease    Acute blood loss anemia    Hyponatremia    Lymphadenopathy of head and neck    Cervical adenopathy    Acute hypoxemic respiratory failure        Review of patient's allergies indicates:   Allergen Reactions    Ampicillin     Peaches [peach (prunus persica)] Other (See Comments)     Pt unable to state type of reaction. Information obtained from daughter who states she was informed of allergy from patient.    Penicillins      Other reaction(s): Hives    Sulfa (sulfonamide antibiotics) Rash and Hives       Current Facility-Administered Medications on File Prior to Encounter   Medication Dose Route Frequency Provider Last Rate Last Admin    celecoxib capsule 400 mg  400 mg Oral Once Dieudonne Marshall MD        fentaNYL 50 mcg/mL injection  mcg   mcg Intravenous PRN Dieudonne Marshall MD   100 mcg at 12/21/21 0919    LIDOcaine (PF) 10 mg/ml (1%) injection 10 mg  1 mL Intradermal Once PRN Dieudonne Marshall MD        LIDOcaine (PF) 10 mg/ml (1%) injection 10 mg  1 mL Intradermal Once Dieudonne Marshall MD        midazolam (VERSED) 1 mg/mL injection 0.5-4 mg  0.5-4 mg Intravenous PRN Dieudonne Marshall MD   2 mg at 12/21/21 0919    ropivacaine 0.2% [facility] PainPRO Pump infusion 500 ML   Perineural Continuous Dieudonne Marshall MD   New Bag at 12/21/21 1153     Current Outpatient Medications on File Prior to Encounter   Medication Sig Dispense Refill    ACETAMINOPHEN (TYLENOL ARTHRITIS PAIN ORAL) Take 1 tablet by mouth once daily.       albuterol (VENTOLIN HFA) 90 mcg/actuation inhaler Inhale 2 puffs into the lungs every 6 (six) hours as needed for Shortness of Breath. Rescue 18 g 0    amLODIPine (NORVASC) 10 MG tablet Take 1 tablet (10 mg total) by mouth once daily. 90 tablet 3    aspirin 325 MG tablet Take 1 tablet at lunch daily starting after surgery for 6 weeks to prevent DVT. 42 tablet 0    diclofenac sodium (VOLTAREN) 1 % Gel Apply 2 g topically 4 (four) times daily. for 10 days 400 g 0    epinastine 0.05 % ophthalmic solution Place 1 drop into both eyes once daily.       EUTHYROX 25 mcg tablet Take 1 tablet by  mouth once daily 90 tablet 0    fish,bora,flax oils-om3,6,9no1 (OMEGA 3-6-9) 1,200 mg Cap Take 1 each by mouth once daily. 30 capsule 3    fluticasone propionate (FLONASE) 50 mcg/actuation nasal spray 1 spray (50 mcg total) by Each Nostril route 2 (two) times daily. 16 g 0    FLUZONE HIGHDOSE QUAD 20-21  mcg/0.7 mL Syrg ADM 0.7ML IM UTD      hydrALAZINE (APRESOLINE) 100 MG tablet TAKE 1 TABLET BY MOUTH THREE TIMES DAILY 90 tablet 0    HYDROcodone-acetaminophen (NORCO) 5-325 mg per tablet Take 1 tablet by mouth every 6 (six) hours as needed.      ibuprofen (ADVIL,MOTRIN) 800 MG tablet Take 800 mg by mouth every 8 (eight) hours as needed.      levocetirizine (XYZAL) 5 MG tablet Take 1 tablet (5 mg total) by mouth every evening. For sinus 30 tablet 0    lisinopriL (PRINIVIL,ZESTRIL) 40 MG tablet Take 1 tablet by mouth once daily 90 tablet 0    meloxicam (MOBIC) 15 MG tablet Take 1 tablet (15 mg total) by mouth daily as needed (arthritis). 30 tablet 2    methocarbamoL (ROBAXIN) 500 MG Tab Take 1 tablet (500 mg total) by mouth 3 (three) times daily as needed (muscle spasms). 40 tablet 0    metoprolol succinate (TOPROL-XL) 50 MG 24 hr tablet Take 1 tablet by mouth once daily 90 tablet 0    mupirocin (BACTROBAN) 2 % ointment Apply topically 2 (two) times daily.      tiZANidine (ZANAFLEX) 4 MG tablet Take 1 tablet by mouth twice daily as needed 20 tablet 0       Past Surgical History:   Procedure Laterality Date    ARTHROSCOPIC CHONDROPLASTY OF KNEE JOINT Right 12/21/2021    Procedure: ARTHROSCOPY, KNEE, WITH CHONDROPLASTY;  Surgeon: Elly Sullivan MD;  [facility] OR;  Service: Orthopedics;  Laterality: Right;    COLONOSCOPY N/A 9/28/2020    Procedure: COLONOSCOPY;  Surgeon: Jaylan Flynn MD;  Location: [facility] ENDO;  Service: Endoscopy;  Laterality: N/A;    KNEE ARTHROSCOPY W/ MENISCECTOMY Right 12/21/2021    Procedure: ARTHROSCOPY, KNEE, WITH MENISCECTOMY;  Surgeon: Elly Sullivan MD;   Location: [facility] OR;  Service: Orthopedics;  Laterality: Right;  general, regional w catheter, adductor, josefina 50cc,     OOPHORECTOMY      SYNOVECTOMY OF KNEE Right 2021    Procedure: SYNOVECTOMY, KNEE;  Surgeon: Elly Sullivan MD;  Location: [facility] OR;  Service: Orthopedics;  Laterality: Right;    TOTAL ABDOMINAL HYSTERECTOMY      19 yrs ago       Social History     Socioeconomic History    Marital status:    Tobacco Use    Smoking status: Former     Packs/day: 0.50     Years: 6.00     Pack years: 3.00     Types: Cigarettes    Smokeless tobacco: Never    Tobacco comments:     Quit ~ 30 years ago   Substance and Sexual Activity    Alcohol use: No    Drug use: No    Sexual activity: Never     Partners: Male         CBC:   Recent Labs     10/25/22  1557 10/26/22  0249   WBC 42.24* 38.52*   RBC 2.85* 2.62*   HGB 7.9* 7.3*   HCT 23.1* 21.8*   * 562*   MCV 81* 83   MCH 27.7 27.9   MCHC 34.2 33.5       CMP:   Recent Labs     10/25/22  0325 10/25/22  1416 10/26/22  0249   * 134* 135*   K 4.1 4.1 4.2    103 106   CO2 16* 15* 16*   * 140* 154*   CREATININE 4.4* 5.1* 5.7*   * 187* 171*   MG 2.8*  --  3.0*   PHOS 4.7*  --  6.3*   CALCIUM 8.8 8.5* 8.3*   ALBUMIN 1.8* 1.8* 1.8*   PROT 7.3 7.1 6.4   ALKPHOS 160* 180* 136*   * 113* 85*   * 97* 57*   BILITOT 2.1* 2.0* 1.7*       INR  Recent Labs     10/24/22  0353   INR 1.1   APTT 46.4*           Diagnostic Studies:      EKD Echo:  No results found for this or any previous visit.      Pre-op Assessment    I have reviewed the Patient Summary Reports.     I have reviewed the Nursing Notes. I have reviewed the NPO Status.   I have reviewed the Medications.     Review of Systems  Cardiovascular:   Hypertension    Pulmonary:   Pneumonia Shortness of breath Sleep Apnea    Renal/:   Chronic Renal Disease    Endocrine:   Hypothyroidism        Physical Exam  General: Somnolent    Airway:  Mallampati: II   Mouth  Opening: Normal  TM Distance: Normal  Tongue: Normal  Neck ROM: Normal ROM    Chest/Lungs:  Clear to auscultation  On 10L O2  Heart:  Rate: Normal  Rhythm: Regular Rhythm  Sounds: Normal        Anesthesia Plan  Type of Anesthesia, risks & benefits discussed:    Anesthesia Type: Gen Natural Airway  Intra-op Monitoring Plan: Standard ASA Monitors  Post Op Pain Control Plan: multimodal analgesia and IV/PO Opioids PRN  Induction:  IV  Airway Plan: , Post-Induction  Informed Consent: Informed consent signed with the Patient and all parties understand the risks and agree with anesthesia plan.  All questions answered.   ASA Score: 4    Ready For Surgery From Anesthesia Perspective.     .

## 2022-10-26 NOTE — ASSESSMENT & PLAN NOTE
Multifocal Pneumonia  Hemoptysis      78 yo PMHx HTN, hypothyroid, HFpEF, OA admitted for fevers and respiratory symptoms, treated while on Hospital Medicine for multifocal PNA. Course complicated by GIB bleed (no EGD yet d/t PNA), hyponatremia, ATN. MICU consulted for rapidly increasing oxygen requirement (4L NC to BiPAP 15/10 50%), respiratory distress/work of breathing, somnolence. Nephrology consulted for ATN, concerns for possible nephritic disease as  behind ATN d/t acanthrocytes. Reports of scant hemoptysis by nursing staff 10/21, 10/22.    Imaging: CXR: Diffuse bilateral airspace infiltrates w/ c/f alveolar fillings; CT chest wc 10/17 with bl perihilar dense consolidations w/ peripheral patcy areas of GGO, BL PNA, less c/f cardiogenic pulmonary edema.  Labs: WBC uptrending 28.97, Hgb lowest 6.0 (s/p 2 u PRBC), continues to downtrend approx 1 point / day. sCr uptrending, (baseline 1.0-1.2). .      Recent Labs     10/23/22  2228   PH 7.378   PCO2 29.7*   PO2 101*   HCO3 17.5*   POCSATURATED 98   BE -8     Etiology: Given concerns for GN per nephrology, recent hemoptysis, concerns for DAH. Concerns for possible PE as patient was not on thrombotic ppx s/o GIB. Incomplete I/Os charted, though reports of low UOP also renders pulmonary congestion edema as a possibility. Less c/f acute progression of multifocal PNA as adequate ABx coverage and no new fevers recently.    -- US DVT BLE stat  -- Consideration for urgent BAL +/- repeat chest imaging (last done 10/17)  -- Solumedrol IV to address possible ANCA vasculitis vs anti-GBM   -- Rheumatology consulted, recommended IV solumedrol followed by pred 60 and prophylactic bactrim.  -- Per nephrology and rheumatology, blood bank consulted for plasmapharesis.  -- Blood bank consulted for plasmapharesis.  -- Continue cefepime for treatment of multifocal PNA; ID following  -- Diuretic trial PRN  -- Wean supplemental O2 as tolerated  -- Neuro checks  -- NPO  while on continuous NIPPV  -- Blood gas PRN  -- supplemental O2 prn

## 2022-10-26 NOTE — PROGRESS NOTES
[facility]  Nephrology  Progress Note    Patient Name: Kristin Goodman  MRN: [id number]  Admission Date: 10/17/2022  Hospital Length of Stay: 9 days  Attending Provider: Fxo Holbrook MD   Primary Care Physician: Lori Hernandez MD  Principal Problem:Sepsis due to pneumonia    Subjective:     HPI: Kristin Goodman is a 77 year old female with hypertension, hypothyroidism, HFpEF who presents for cough, shortness of breath, and weakness.  Patient initially presented to ER on 09/24 with hemoptysis and imaging concerning for multilobar pneumonia at which time she was discharged on a 10 day course of levofloxacin.  Patient initially had some improvement but began having worsening symptoms on 10/14.  Patient describes multiple fevers and progressive shortness of breath.  She continues to have intermittent hemoptysis.  Patient with worsening leukocytosis and limited clinical improvement despite broad-spectrum antibiotics.  She remains on cefepime.  Patient is a noted to have baseline creatinine of 1 as recent as 8/2022 but progressive worsening of creatinine in early October.  On admission patient with creatinine of 1.6 (10/17) with subsequent progression and creatinine of 3.0 at time of consultation (10/23).  Nephrology consulted for acute kidney injury.      Interval History: Patient on 8LNC this morning, agitated overnight so placed on precedex gtt. Renal function worsening. S/p kidney bx today. Plan for Plex today.     Review of patient's allergies indicates:   Allergen Reactions    Ampicillin     Peaches [peach (prunus persica)] Other (See Comments)     Pt unable to state type of reaction. Information obtained from daughter who states she was informed of allergy from patient.    Penicillins      Other reaction(s): Hives    Sulfa (sulfonamide antibiotics) Rash and Hives     Current Facility-Administered Medications   Medication Frequency    0.9%  NaCl infusion (for blood administration) Q24H  PRN    0.9%  NaCl infusion (for blood administration) Q24H PRN    acetaminophen tablet 650 mg Q4H PRN    albuterol-ipratropium 2.5 mg-0.5 mg/3 mL nebulizer solution 3 mL Q4H PRN    aluminum-magnesium hydroxide-simethicone 200-200-20 mg/5 mL suspension 30 mL QID PRN    amLODIPine tablet 10 mg Daily    azithromycin (ZITHROMAX) 250 mg in dextrose 5 % 250 mL IVPB Q24H    bisacodyL suppository 10 mg Daily PRN    calcium gluconate 1 g in NS IVPB (premixed) Once    cefepime in dextrose 5 % 1 gram/50 mL IVPB 1 g Q24H    cloNIDine tablet 0.2 mg Q4H PRN    dexmedetomidine (PRECEDEX) 400mcg/100mL 0.9% NaCL infusion Continuous    dextrose 10% bolus 125 mL PRN    dextrose 10% bolus 250 mL PRN    diphenhydrAMINE injection 50 mg Once    fentaNYL (SUBLIMAZE) 50 mcg/mL injection     glucagon (human recombinant) injection 1 mg PRN    glucose chewable tablet 16 g PRN    glucose chewable tablet 24 g PRN    heparin (porcine) injection 3,000 Units Once    hydrALAZINE injection 10 mg Q8H PRN    insulin aspart U-100 pen 1-10 Units Q6H PRN    levothyroxine tablet 25 mcg Daily    melatonin tablet 6 mg Nightly PRN    methocarbamoL tablet 500 mg TID PRN    metoprolol succinate (TOPROL-XL) 24 hr tablet 100 mg Daily    midazolam (VERSED) 1 mg/mL injection     naloxone 0.4 mg/mL injection 0.02 mg PRN    omega-3 acid ethyl esters capsule 1 g Daily    ondansetron disintegrating tablet 8 mg Q8H PRN    pantoprazole injection 40 mg Daily    phenylephrine HCl in 0.9% NaCl 1 mg/10 mL (100 mcg/mL) syringe     [START ON 10/27/2022] predniSONE tablet 60 mg Daily    prochlorperazine injection Soln 5 mg Q6H PRN    simethicone chewable tablet 80 mg QID PRN    sodium bicarbonate tablet 650 mg BID    sodium chloride 0.9% flush 5 mL PRN    sucralfate 100 mg/mL suspension 1 g Q6H    sugammadex (BRIDION) 100 mg/mL injection      Facility-Administered Medications Ordered in Other Encounters   Medication Frequency    celecoxib  capsule 400 mg Once    fentaNYL 50 mcg/mL injection  mcg PRN    LIDOcaine (PF) 10 mg/ml (1%) injection 10 mg Once PRN    LIDOcaine (PF) 10 mg/ml (1%) injection 10 mg Once    midazolam (VERSED) 1 mg/mL injection 0.5-4 mg PRN    ropivacaine 0.2% [facility] PainPRO Pump infusion 500 ML Continuous       Objective:     Vital Signs (Most Recent):  Temp: 97.9 °F (36.6 °C) (10/26/22 1200)  Pulse: (!) 59 (10/26/22 1300)  Resp: 16 (10/26/22 1300)  BP: (!) 117/59 (10/26/22 1300)  SpO2: (!) 94 % (10/26/22 1300)  O2 Device (Oxygen Therapy): High Flow nasal Cannula (10/26/22 1300)   Vital Signs (24h Range):  Temp:  [97.6 °F (36.4 °C)-98.1 °F (36.7 °C)] 97.9 °F (36.6 °C)  Pulse:  [59-83] 59  Resp:  [14-33] 16  SpO2:  [89 %-100 %] 94 %  BP: (102-162)/(51-74) 117/59     Weight: 63.5 kg (139 lb 15.9 oz) (10/17/22 1633)  Body mass index is 26.45 kg/m².  Body surface area is 1.65 meters squared.    I/O last 3 completed shifts:  In: 480 [P.O.:480]  Out: 1332 [Urine:1332]    Physical Exam  Vitals and nursing note reviewed.   Constitutional:       General: She is not in acute distress.     Appearance: She is well-developed. She is ill-appearing.      Comments: Patient somnolent   HENT:      Head: Normocephalic and atraumatic.   Cardiovascular:      Rate and Rhythm: Normal rate and regular rhythm.      Heart sounds: Normal heart sounds.   Pulmonary:      Effort: Pulmonary effort is normal. No respiratory distress.      Breath sounds: Rales present. No wheezing.   Abdominal:      General: Bowel sounds are normal. There is no distension.      Palpations: Abdomen is soft.      Tenderness: There is no abdominal tenderness.   Musculoskeletal:         General: No tenderness. Normal range of motion.      Cervical back: Normal range of motion and neck supple.      Right lower leg: No edema.      Left lower leg: No edema.   Lymphadenopathy:      Cervical: No cervical adenopathy.   Skin:     General: Skin is warm and dry.      Capillary  Refill: Capillary refill takes less than 2 seconds.      Findings: No rash.   Neurological:      General: No focal deficit present.      Mental Status: She is oriented to person, place, and time.   Psychiatric:      Comments: Patient somnolent, arouses to sternal rub, does not answer questions       Significant Labs:  CBC:   Recent Labs   Lab 10/26/22  0249   WBC 38.52*   RBC 2.62*   HGB 7.3*   HCT 21.8*   *   MCV 83   MCH 27.9   MCHC 33.5     CMP:   Recent Labs   Lab 10/26/22  0249 10/26/22  1233   * 181*   CALCIUM 8.3* 8.2*   ALBUMIN 1.8*  --    PROT 6.4  --    * 136   K 4.2 4.3   CO2 16* 15*    105   * 172*   CREATININE 5.7* 6.0*   ALKPHOS 136*  --    ALT 85*  --    AST 57*  --    BILITOT 1.7*  --      All labs within the past 24 hours have been reviewed.     Significant Imaging:       Assessment/Plan:     Hyponatremia  Patient with worsening hyponatremia since admission.  Baseline sodium 140, on admission 135 worsening to 126 but subsequently up trending to 129 at the time of consultation (10/23). Improved to 135 on 10/26.     - daily BMP    Acute renal failure superimposed on stage 3 chronic kidney disease  Patient with baseline creatinine of 1 as recent as August 2022 with progressive worsening beginning in early October 1.3 (10/13)->1.6 (10/17)->3.0 (10/23) despite IV fluids.  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  Patient noted to have new onset proteinuria and hematuria over the last 1 month.  Additionally, would consider ATN in the setting of suspected sepsis from multifocal pneumonia.     Concerns for glomerulonephritis given patient's current clinical picture. Initial urine microscopy demonstrating muddy brown casts with likely acanthocytes noted as well. MITUL positive, proteinase 3 ab positive as well. Patient s/p IV solumedrol x3 doses, now on prednisone 60mg qd. Underwent kidney bx today.  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 Kidney function continuing to worsen, /Cr 6.0.      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 I will follow-up with patient. Please contact us if you have any additional questions.    Bipin Frias MD  Nephrology  [facility]

## 2022-10-26 NOTE — PROGRESS NOTES
"[facility]  Rheumatology  Progress Note    Patient Name: Kristin Goodman  MRN: [id number]  Admission Date: 10/17/2022  Hospital Length of Stay: 8 days  Code Status: Full Code   Attending Provider: Fox Holbrook MD  Primary Care Physician: Lori Hernandez MD  Principal Problem: Sepsis due to pneumonia    Subjective:     HPI: Patient is a 78 yo F with chronic diastolic heart failure, HTN, OA, who is admitted for respiratory failure. Rheumatology is consulted due to concern for vasculitis. Patient initially presented to the ED on 9/24/22 complaining of a week of cough followed by an episode of hemoptysis on 9/24 prompting the ED visit. They did a CTA which showed multifocal PNA. She was sent home with Levuin. She followed up with PCP on 10/4/22 and was feeling better. Then she presented to the ED again on 10/14 and on 10/17 complaining of dyspnea. She had fevers in the few days leading up to admission of 102. She endorsed weakness, productive cough, dyspnea, and loss of appetite. Pt initally at 88% O2 saturation and improved to 98% on 2L NC. She was admitted for IV antibiotics. CT chest with contrast on 10/17 showed evidence of interval progression of bilateral perihilar dense consolidation with peripheral patchy areas of ground-glass opacification nonspecific as to the etiology.  Bilateral pneumonia as well as inflammatory etiologies considered.  Cardiogenic pulmonary edema considered less likely given the pattern.    On 10/19/22 she started having melena. Hb dropped to 6. She got 2 units pRBCs. GI was consulted. They noted "Colonoscopy in 2020 showed internal hemorrhoids and mild sigmoid diverticulosis. EGD in 2012 showed gastritis, biopsies positive for H. Pylori." "We considered doing EGD now, but from a pulmonary standpoint I do not think she is stable enough with the current pneumonia, therefore would recommend that we just follow the patient, treat with a PPI in case there is any " "peptic ulcer disease."    On 10/21/22 ENT was consulted due to left sided otalgia the day prior which resolved. She also was complaining of cervical adenopathy and sore throat. They did not recommend surgical intervention and felt that adenopathy was likely reactive.     On 10/23/22 ID was consulted. They recommended checking respiratory infection panel and fungal markers.    On 10/23/22 Nephrology was consulted due to worsening creatinine. They noted, "Patient is a noted to have baseline creatinine of 1 as recent as 8/2022 but progressive worsening of creatinine in early October.  On admission patient with creatinine of 1.6 (10/17) with subsequent progression and creatinine of 3.0 at time of consultation (10/23).  Nephrology consulted for acute kidney injury." "Patient with ATN nephritic picture in urine sediment, prot/crea ratio pending. Had hematuria in recent past but in context with positive urine cultures. Now definitely more dysmorphic red cells that wbcs in the urine. 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    On 10/23/22, she was transferred to ICU due to desaturations and respiratory distress. Pulmonologist noted that her respiratory status is too tenuous for bronchoscopy. She was stabilized with non-invasive ventilation and nasal cannula oxygen.      Interval History: Large Hb drop from 14 to 8.9 overnight. WBC count up to 43 and platelets 640. Creatinine worsened to 4.4. ALT/AST are improving. She increased from 6L to 8L high flow NC.    Current Facility-Administered Medications   Medication Frequency    0.9%  NaCl infusion (for blood administration) Q24H PRN    0.9%  NaCl infusion (for blood administration) Q24H PRN    acetaminophen tablet 1,000 mg Q8H PRN    acetaminophen tablet 650 mg Q4H PRN    albuterol-ipratropium 2.5 mg-0.5 mg/3 mL nebulizer solution 3 mL Q4H PRN    " aluminum-magnesium hydroxide-simethicone 200-200-20 mg/5 mL suspension 30 mL QID PRN    amLODIPine tablet 10 mg Daily    bisacodyL suppository 10 mg Daily PRN    cloNIDine tablet 0.2 mg Q4H PRN    dextrose 10% bolus 125 mL PRN    dextrose 10% bolus 250 mL PRN    glucagon (human recombinant) injection 1 mg PRN    glucose chewable tablet 16 g PRN    glucose chewable tablet 24 g PRN    hydrALAZINE tablet 100 mg TID    hydrALAZINE tablet 25 mg Q8H PRN    insulin aspart U-100 pen 1-10 Units Q6H PRN    levothyroxine tablet 25 mcg Daily    melatonin tablet 6 mg Nightly PRN    methocarbamoL tablet 500 mg TID PRN    metoprolol succinate (TOPROL-XL) 24 hr tablet 100 mg Daily    naloxone 0.4 mg/mL injection 0.02 mg PRN    omega-3 acid ethyl esters capsule 1 g Daily    ondansetron disintegrating tablet 8 mg Q8H PRN    pantoprazole injection 40 mg Daily    prochlorperazine injection Soln 5 mg Q6H PRN    simethicone chewable tablet 80 mg QID PRN    sodium chloride 0.9% flush 5 mL PRN    sucralfate 100 mg/mL suspension 1 g Q6H     Facility-Administered Medications Ordered in Other Encounters   Medication Frequency    celecoxib capsule 400 mg Once    fentaNYL 50 mcg/mL injection  mcg PRN    LIDOcaine (PF) 10 mg/ml (1%) injection 10 mg Once PRN    LIDOcaine (PF) 10 mg/ml (1%) injection 10 mg Once    midazolam (VERSED) 1 mg/mL injection 0.5-4 mg PRN    ropivacaine 0.2% [facility] PainPRO Pump infusion 500 ML Continuous     Objective:     Vital Signs (Most Recent):  Temp: 97.5 °F (36.4 °C) (10/25/22 0715)  Pulse: 77 (10/25/22 0900)  Resp: 19 (10/25/22 0900)  BP: (!) 147/68 (10/25/22 0900)  SpO2: 96 % (10/25/22 0900)  O2 Device (Oxygen Therapy): High Flow nasal Cannula (10/25/22 0828)   Vital Signs (24h Range):  Temp:  [97.5 °F (36.4 °C)-98.3 °F (36.8 °C)] 97.5 °F (36.4 °C)  Pulse:  [65-77] 77  Resp:  [19-35] 19  SpO2:  [91 %-100 %] 96 %  BP: ()/(46-74) 147/68     Weight: 63.5 kg (139 lb 15.9 oz)  (10/17/22 1633)  Body mass index is 26.45 kg/m².  Body surface area is 1.65 meters squared.      Intake/Output Summary (Last 24 hours) at 10/25/2022 0927  Last data filed at 10/25/2022 0919  Gross per 24 hour   Intake 90 ml   Output 1303 ml   Net -1213 ml       Physical Exam   Constitutional: She is oriented to person, place, and time. No distress.   Appears fatigued   HENT:   Head: Normocephalic and atraumatic.   Eyes: Pupils are equal, round, and reactive to light.   Cardiovascular: Normal rate and regular rhythm.   No murmur heard.  Pulmonary/Chest: Effort normal. No respiratory distress. She has no wheezes. She has rales.   Abdominal: Soft. Bowel sounds are normal. There is no abdominal tenderness.   Musculoskeletal:         General: No deformity. Normal range of motion.      Cervical back: Normal range of motion.   Neurological: She is alert and oriented to person, place, and time.   Skin: Skin is warm and dry.   Psychiatric: Affect and judgment normal.     Significant Labs:  CBC:   Recent Labs   Lab 10/25/22  0325   WBC 43.48*   HGB 8.9*   HCT 25.0*   *     CMP:   Recent Labs   Lab 10/25/22  0325   *   CALCIUM 8.8   ALBUMIN 1.8*   PROT 7.3   *   K 4.1   CO2 16*      *   CREATININE 4.4*   ALKPHOS 160*   *   *   BILITOT 2.1*       Significant Imaging:  Imaging results within the past 24 hours have been reviewed.    Assessment/Plan:     Acute hypoxemic respiratory failure  Patient is a 76 yo F with chronic diastolic heart failure, HTN, OA, who is admitted for respiratory failure. Rheumatology is consulted due to concern for vasculitis.    Patient presented with cough and hemoptysis since 9/24/22. She was admitted due to dyspnea and hypoxia on 10/17. She has had Hb drop to 6 this admission requiring blood transfusions. GI defers invasive workup for now because she is not stable enough. She has had increasing creatinine from baseline of 1 to 3.0 on 10/23/22. Nephrology  concerned for ATN nephritic picture in urine sediment.    Reviewed her chest imaging which shows progressive disease from 10/14 until now which can be concerning for DAH. This might also explain the Hb drop. No bronch planned for now due to her tenuous respiratory status.    CBC: 17 WBCs, Hb 14 now (s/p transfusion), plt 495  CMP: creatinine 3.5, GFR 13. AST//164  UA: 1+ blood, 88 RBCs, 18 WBCs  UPCR 1.54  Complements normal  RF and CCP negative    MITUL+ 1:2560 homogenous, profile pending  PR3 slightly elevated at 1.2 (reference positive is 1.0)  GBM antibodies negative    Pending: ANCAs, MPO, cryoglobulins    IV Solumedrol 1000 mg daily, day 2 of 3    Large Hb drop from 14 to 8.9 overnight. WBC count up to 43 and platelets 640. Possible that yesterday's CBC was incorrect because Hb day before was in the 8's. However, creatinine worsened to 4.4 and urine output decreased. She increased oxygen requirements from 6L to 8L high flow NC.  ALT/AST are improving. Discussed with Nephrology who are concerned that she will need dialysis soon. She will get a central line for possible PLEX. 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      Recommendations:  1. Follow up pending labs as above. Also added Quantiferon and other infectious labs prior to giving chemotherapy.  2. Kidney biopsy delayed until next week due to scheduling issues.  3. PO prednisone 60 mg daily.  4. Please start Bactrim-DS three times weekly or atovaquone 1500 mg daily while on high dose steroids.  5. Continue Protonix daily while on high dose steroids.  6. Will plan for Rituximab 375 mg/m^2 weekly for 4 doses. Cyclophosphamide 12.5 mg/kg added on weeks 1 and 3.      Discussed with attending physician, Dr. Palacio. Attending attestation to follow.    Layne Aguirre MD  Rheumatology Fellow  PGY-5            Layne Aguirre,  MD  Rheumatology  [facility]

## 2022-10-26 NOTE — PROGRESS NOTES
[facility] - Cardiac Medical ICU  Critical Care Medicine  Progress Note    Patient Name: Kristin Goodman  MRN: [id number]  Admission Date: 10/17/2022  Hospital Length of Stay: 8 days  Code Status: Full Code  Attending Provider: Fox Holbrook MD  Primary Care Provider: Lori Hernandez MD   Principal Problem: Sepsis due to pneumonia    Subjective:     HPI:  Ms. Goodman is a 77 year old female with PMH notable for HTN, hypothyroidism, HFpEF (65%, TR, elevated PA pressure), and osteoarthritis of the arm who initially presented to [facility] ED 10/17 with progressive shortness of breath, weakness, cough, and hemoptysis. Previously, she presented the ED 9/24 with hemoptysis and CT and CXR at that time concerning for PNA which she was given a 10 day course of abx and albuterol. She followed up with her PCP and had improvement of symptoms. She then presented to the ED 10/14 with no interventions. At the time of this presentation, 10/17, in addition to above symptoms, she reports fever up to 102.4 and increasing amounts of hemoptysis. CT chest with extensive opacification at admission. She was started on broad spectrum IV abx coverage with Vanc / Zosyn and an infectious work up was sent. ID was later consulted during the hospitalization.     Her hospitalization has been complicated by GIB which required 2 u PRBC transfusion and PPI therapy. No EGD with GI. Additionally had bilateral cervical adenopathy which was evaluated by ENT and felt to be reactive in nature. She has had worsening renal function and nephrology was consulted with plans for a kidney biopsy. She has been trailed with IV diuresis. Nephrology concerned GN. Rheumatologic work up in process.     Critical care consulted on the evening of 10/23 for worsening respiratory failure now requiring NIPPV. Earlier today on 4L nasal cannula then transitioned to venti mask and later BiPap. Worsening opacification on CXR and AMS. Patient upgraded to MICU for higher level of care  and closer monitoring in setting of her tenuous respiratory status.       Hospital/ICU Course:  Pt transferred to ICU 10/23/22 2/2 respiratory distress in the setting of hemoptysis. Decision made to consult IR and rheumatology. High-dose solumedrol x 3 days followed by predinitiated. Complete rheumatologic work-up initiated. IR consulted for renal biopsy, planned for 10/25 but postponed.       Interval History/Significant Events: NAEON. Pt with worsening mental status this AM.     Review of Systems   Constitutional:  Negative for chills, fever and unexpected weight change.   HENT:  Negative for sore throat.    Eyes:  Negative for photophobia and visual disturbance.   Respiratory:  Positive for cough and shortness of breath. Negative for wheezing.    Cardiovascular:  Negative for chest pain and leg swelling.   Gastrointestinal:  Negative for abdominal pain, diarrhea and vomiting.   Genitourinary:  Negative for dysuria and frequency.   Musculoskeletal:  Positive for back pain. Negative for arthralgias and joint swelling.   Skin:  Negative for rash.   Neurological:  Negative for dizziness and headaches.   Psychiatric/Behavioral:  Negative for behavioral problems and hallucinations.    Objective:     Vital Signs (Most Recent):  Temp: 97.8 °F (36.6 °C) (10/25/22 1504)  Pulse: 74 (10/25/22 1800)  Resp: (!) 25 (10/25/22 1800)  BP: 130/60 (10/25/22 1800)  SpO2: 100 % (10/25/22 1800)   Vital Signs (24h Range):  Temp:  [96.9 °F (36.1 °C)-98.3 °F (36.8 °C)] 97.8 °F (36.6 °C)  Pulse:  [62-77] 74  Resp:  [13-33] 25  SpO2:  [89 %-100 %] 100 %  BP: ()/(46-70) 130/60   Weight: 63.5 kg (139 lb 15.9 oz)  Body mass index is 26.45 kg/m².      Intake/Output Summary (Last 24 hours) at 10/25/2022 1853  Last data filed at 10/25/2022 1847  Gross per 24 hour   Intake 480 ml   Output 822 ml   Net -342 ml       Physical Exam  Vitals and nursing note reviewed.   Constitutional:       General: She is not in acute distress.     Appearance:  She is well-developed. She is ill-appearing.   HENT:      Head: Normocephalic and atraumatic.   Cardiovascular:      Rate and Rhythm: Normal rate and regular rhythm.      Heart sounds: Normal heart sounds.   Pulmonary:      Effort: Pulmonary effort is normal. No respiratory distress.      Breath sounds: Rales present. No wheezing.   Abdominal:      General: Bowel sounds are normal. There is no distension.      Palpations: Abdomen is soft.      Tenderness: There is no abdominal tenderness.   Musculoskeletal:         General: No tenderness. Normal range of motion.      Cervical back: Normal range of motion and neck supple.      Right lower leg: No edema.      Left lower leg: No edema.   Lymphadenopathy:      Cervical: No cervical adenopathy.   Skin:     General: Skin is warm and dry.      Capillary Refill: Capillary refill takes less than 2 seconds.      Findings: No rash.   Neurological:      General: No focal deficit present.      Mental Status: She is oriented to person, place, and time. She is lethargic and confused.   Psychiatric:         Mood and Affect: Mood normal.         Behavior: Behavior normal.       Vents:  Oxygen Concentration (%): 50 (10/24/22 2200)  Lines/Drains/Airways       Drain  Duration                  Urethral Catheter 10/23/22 2230 16 Fr. 1 day              Peripheral Intravenous Line  Duration                  Midline Catheter Insertion/Assessment  - Single Lumen 10/18/22 1831 Left brachial vein 18g x 10cm 7 days         Peripheral IV - Single Lumen 10/24/22 0424 20 G Right Antecubital 1 day                  Significant Labs:    CBC/Anemia Profile:  Recent Labs   Lab 10/24/22  0353 10/25/22  0325 10/25/22  1557   WBC 17.60* 43.48* 42.24*   HGB 14.6 8.9* 7.9*   HCT 43.2 25.0* 23.1*   * 640* 600*   MCV 82 80* 81*   RDW 14.1 13.8 14.2        Chemistries:  Recent Labs   Lab 10/24/22  0353 10/24/22  0953 10/25/22  0325 10/25/22  1416     --  129* 134*   K 3.6 4.3 4.1 4.1     --   101 103   CO2 14*  --  16* 15*   BUN 96*  --  123* 140*   CREATININE 3.5*  --  4.4* 5.1*   CALCIUM 9.2  --  8.8 8.5*   ALBUMIN 1.8*  --  1.8* 1.8*   PROT 7.7  --  7.3 7.1   BILITOT 3.1*  --  2.1* 2.0*   ALKPHOS 146*  --  160* 180*   *  --  137* 113*   *  --  166* 97*   MG  --   --  2.8*  --    PHOS  --   --  4.7*  --        CMP:   Recent Labs   Lab 10/24/22  0353 10/24/22  0953 10/25/22  0325 10/25/22  1416     --  129* 134*   K 3.6 4.3 4.1 4.1     --  101 103   CO2 14*  --  16* 15*   *  --  166* 187*   BUN 96*  --  123* 140*   CREATININE 3.5*  --  4.4* 5.1*   CALCIUM 9.2  --  8.8 8.5*   PROT 7.7  --  7.3 7.1   ALBUMIN 1.8*  --  1.8* 1.8*   BILITOT 3.1*  --  2.1* 2.0*   ALKPHOS 146*  --  160* 180*   *  --  166* 97*   *  --  137* 113*   ANIONGAP 17*  --  12 16     All pertinent labs within the past 24 hours have been reviewed.    Significant Imaging:  I have reviewed all pertinent imaging results/findings within the past 24 hours.      ABG  Recent Labs   Lab 10/23/22  2228   PH 7.378   PO2 101*   PCO2 29.7*   HCO3 17.5*   BE -8     Assessment/Plan:     Pulmonary  Acute hypoxemic respiratory failure  Multifocal Pneumonia  Hemoptysis      76 yo PMHx HTN, hypothyroid, HFpEF, OA admitted for fevers and respiratory symptoms, treated while on Hospital Medicine for multifocal PNA. Course complicated by GIB bleed (no EGD yet d/t PNA), hyponatremia, ATN. MICU consulted for rapidly increasing oxygen requirement (4L NC to BiPAP 15/10 50%), respiratory distress/work of breathing, somnolence. Nephrology consulted for ATN, concerns for possible nephritic disease as  behind ATN d/t acanthrocytes. Reports of scant hemoptysis by nursing staff 10/21, 10/22.    Imaging: CXR: Diffuse bilateral airspace infiltrates w/ c/f alveolar fillings; CT chest wc 10/17 with bl perihilar dense consolidations w/ peripheral patcy areas of GGO, BL PNA, less c/f cardiogenic pulmonary edema.  Labs: WBC  uptrending 28.97, Hgb lowest 6.0 (s/p 2 u PRBC), continues to downtrend approx 1 point / day. sCr uptrending, (baseline 1.0-1.2). .      Recent Labs     10/23/22  2228   PH 7.378   PCO2 29.7*   PO2 101*   HCO3 17.5*   POCSATURATED 98   BE -8     Etiology: Given concerns for GN per nephrology, recent hemoptysis, concerns for DAH. Concerns for possible PE as patient was not on thrombotic ppx s/o GIB. Incomplete I/Os charted, though reports of low UOP also renders pulmonary congestion edema as a possibility. Less c/f acute progression of multifocal PNA as adequate ABx coverage and no new fevers recently.    -- US DVT BLE stat  -- Consideration for urgent BAL +/- repeat chest imaging (last done 10/17)  -- Solumedrol IV to address possible ANCA vasculitis vs anti-GBM   -- Rheumatology consulted, recommended IV solumedrol followed by pred 60 and prophylactic bactrim.  -- Per nephrology and rheumatology, blood bank consulted for plasmapharesis.  -- Blood bank consulted for plasmapharesis.  -- Continue cefepime for treatment of multifocal PNA; ID following  -- Diuretic trial PRN  -- Wean supplemental O2 as tolerated  -- Neuro checks  -- NPO while on continuous NIPPV  -- Blood gas PRN  -- supplemental O2 prn    Cardiac/Vascular  Chronic diastolic heart failure  Pulmonary Hypertension    TTE (10/2022) EF 65%, G1DD  Home meds: Lisinopril, Toprol  Dry weight: N/A    -- Diuretic plan per Acute Hypoxemic Respiratory Failure A/P  -- Daily weights (standing if tolerated)  -- Strict I/Os; goal net negative 1.5 - 2 L / day  -- Cardiac diet w/ 2 g Na restriction      HTN (hypertension)  -- Continue home medications as tolerated    Renal/  Hyponatremia  Patient presents with Na 126, baseline mid/high 130s. Now recovered to baseline. Symptoms include: None. Volume status: Eu    -- Urine Na, Osm  -- Serum Osm  -- Trend Na; goal correction < 6-8 units / 24 hours  -- legionella ordered    Acute renal failure superimposed on stage  3 chronic kidney disease  Elevated CPK    Patient w/o CKD presenting with WILFREDO with rapidly increasing sCr (baseline sCr 1.0-1.2). New onset proteinuria/hematuria in last 30 days. .  DDx: ATN vs GN.     Serum creatinine: 5.1 mg/dL (H) 10/25/22 1416  Estimated creatinine clearance: 8.1 mL/min (A)    -- F/U serologies per Nephro (MITUL, ANCA, anti-GBM, IgA, C3, C4, HCV, HBV, cryo, RF)  -- Plan for renal bx per nephro when stable (w/ IR vs interventional nephrology)  -- IV Solumedrol 1mg x3 days  -- Trend sCr  -- Strict I&Os  -- Avoid nephrotoxic agents  -- Renally adjust medications  -- added bicarb today  -- Nephro and Rheumatology discussing plasmapheresis--blood bank consulted.      Oncology  Acute blood loss anemia  resolved    Per GI, bleed remote, no evidence of acute re-bleed; patient has had normal stool for 10-14 days. No EGD this admission d/t PNA.    -- Continue recommended PO PPI QD  -- Monitor for bleed  -- Trend CBC; transfuse Hgb < 7  -- type and screen ordered    Endocrine  Hypothyroid  -- Continue home medications as tolerated        Critical Care Daily Checklist:     A: Awake: RASS Goal/Actual Goal:    Actual: [place] Agitation Sedation Scale (RASS): Alert and calm   B: Spontaneous Breathing Trial Performed?  na   C: SAT & SBT Coordinated?  n/a                      D: Delirium: CAM-ICU    E: Early Mobility Performed? Yes   F: Feeding Goal:    Status:         Current Diet Order   Procedures    Diet Low Sodium, 2gm       AS: Analgesia/Sedation n/a   T: Thromboembolic Prophylaxis N/a d/t recent hemoptysis   H: HOB > 300 Yes   U: Stress Ulcer Prophylaxis (if needed) ppi   G: Glucose Control ssi   B: Bowel Function Stool Occurrence: 1   I: Indwelling Catheter (Lines & Curry) Necessity n/a   D: De-escalation of Antimicrobials/Pharmacotherapies cefepime     Plan for the day/ETD Possible bronch     Code Status:  Family/Goals of Care: Full Code            Critical secondary to Patient has a condition  that poses threat to life and bodily function: Renal failure       Critical care was time spent personally by me on the following activities: development of treatment plan with patient or surrogate and bedside caregivers, discussions with consultants, evaluation of patient's response to treatment, examination of patient, ordering and performing treatments and interventions, ordering and review of laboratory studies, ordering and review of radiographic studies, pulse oximetry, re-evaluation of patient's condition. This critical care time did not overlap with that of any other provider or involve time for any procedures.     Magui Cage MD  Critical Care Medicine  [facility] - Cardiac Medical ICU

## 2022-10-26 NOTE — PROCEDURES
"Kristin Goodman is a 77 y.o. female patient.    Temp: 97.9 °F (36.6 °C) (10/25/22 1900)  Pulse: 66 (10/25/22 1900)  Resp: 19 (10/25/22 1900)  BP: 129/62 (10/25/22 1900)  SpO2: 95 % (10/25/22 1900)  Weight: 63.5 kg (139 lb 15.9 oz) (10/17/22 1633)  Height: 5' 1" (154.9 cm) (10/17/22 1633)       Central Line    Date/Time: 10/25/2022 7:49 PM  Performed by: Magui Cage MD  Authorized by: Magui Cage MD     Location procedure was performed:  [facility] CRITICAL CARE MEDICINE  Assisting Provider:  Mich Wing MD  Consent Done ?:  Yes  Time out complete?: Verified correct patient, procedure, equipment, staff, and site/side    Indications:  Hemodialysis  Anesthesia:  Local infiltration  Local anesthetic:  Lidocaine 2% with epinephrine  Preparation:  Skin prepped with ChloraPrep  Skin prep agent dried: Skin prep agent completely dried prior to procedure    Sterile barriers: All five maximal sterile barriers used - gloves, gown, cap, mask and large sterile sheet    Hand hygiene: Hand hygiene performed immediately prior to central venous catheter insertion    Location:  Left internal jugular  Catheter type:  Trialysis  Catheter size:  7.5 Fr  Ultrasound guidance: Yes    Vessel Caliber:  Small   patent  Comprressibility:  Normal (overly compressible)  Needle advanced into vessel with real time ultrasound guidance.    Steril sheath on probe.    Sterile gel used.  Manometry: Yes    Number of attempts:  3  Complications: No    Specimens: No    Implants: No    Adverse Events:  None  Other Complications:  Unsuccessful advancement of needle into vessel.   Unsuccessful advancement of needle into vessel.    10/25/2022    "

## 2022-10-26 NOTE — PROGRESS NOTES
[facility] - Cardiac Medical ICU  Critical Care Medicine  Progress Note    Patient Name: Kristin Goodman  MRN: [id number]  Admission Date: 10/17/2022  Hospital Length of Stay: 9 days  Code Status: Full Code  Attending Provider: Fox Holbrook MD  Primary Care Provider: Lori Hernandez MD   Principal Problem: Microscopic polyangiitis    Subjective:     HPI:  Ms. Goodman is a 77 year old female with PMH notable for HTN, hypothyroidism, HFpEF (65%, TR, elevated PA pressure), and osteoarthritis of the arm who initially presented to [facility] ED 10/17 with progressive shortness of breath, weakness, cough, and hemoptysis. Previously, she presented the ED 9/24 with hemoptysis and CT and CXR at that time concerning for PNA which she was given a 10 day course of abx and albuterol. She followed up with her PCP and had improvement of symptoms. She then presented to the ED 10/14 with no interventions. At the time of this presentation, 10/17, in addition to above symptoms, she reports fever up to 102.4 and increasing amounts of hemoptysis. CT chest with extensive opacification at admission. She was started on broad spectrum IV abx coverage with Vanc / Zosyn and an infectious work up was sent. ID was later consulted during the hospitalization.     Her hospitalization has been complicated by GIB which required 2 u PRBC transfusion and PPI therapy. No EGD with GI. Additionally had bilateral cervical adenopathy which was evaluated by ENT and felt to be reactive in nature. She has had worsening renal function and nephrology was consulted with plans for a kidney biopsy. She has been trailed with IV diuresis. Nephrology concerned GN. Rheumatologic work up in process.     Critical care consulted on the evening of 10/23 for worsening respiratory failure now requiring NIPPV. Earlier today on 4L nasal cannula then transitioned to venti mask and later BiPap. Worsening opacification on CXR and AMS. Patient upgraded to MICU for higher level of  care and closer monitoring in setting of her tenuous respiratory status.       Hospital/ICU Course:  Pt transferred to ICU 10/23/22 2/2 respiratory distress in the setting of hemoptysis. Decision made to consult IR and rheumatology. High-dose solumedrol x 3 days followed by predinitiated. Complete rheumatologic work-up initiated. IR consulted for renal biopsy, planned for 10/25 but postponed. Worsening mental and respiratory status 10/25.      Interval History/Significant Events: Trialysis catheter placed overnight with plans for plasmapheresis early this AM. Pt tolerated procedure well despite multiple attempts to place line. Went for IR  Renal biopsy this AM. Tolerated procedure well. Endorsing burning at szymanski insertion site. Continuing Azithromycin and Cefepime. Rheumatology planning to start rituximab and cyclophosphamide. 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    Review of Systems   Unable to perform ROS: Mental status change   Genitourinary:  Positive for dysuria.   Objective:     Vital Signs (Most Recent):  Temp: 98 °F (36.7 °C) (10/26/22 1630)  Pulse: 74 (10/26/22 1700)  Resp: 14 (10/26/22 1721)  BP: 134/62 (10/26/22 1700)  SpO2: 96 % (10/26/22 1700) Vital Signs (24h Range):  Temp:  [97.6 °F (36.4 °C)-98.1 °F (36.7 °C)] 98 °F (36.7 °C)  Pulse:  [59-91] 74  Resp:  [14-37] 14  SpO2:  [90 %-100 %] 96 %  BP: (102-162)/(54-75) 134/62   Weight: 63.5 kg (139 lb 15.9 oz)  Body mass index is 26.45 kg/m².      Intake/Output Summary (Last 24 hours) at 10/26/2022 1754  Last data filed at 10/26/2022 1630  Gross per 24 hour   Intake 3344.65 ml   Output 3696 ml   Net -351.35 ml       Physical Exam  Vitals and nursing note reviewed.   Constitutional:       General: She is not in acute distress.     Appearance: She is well-developed. She is ill-appearing.   HENT:      Head: Normocephalic and  atraumatic.   Neck:      Comments: LIJ trialysis  Cardiovascular:      Rate and Rhythm: Normal rate and regular rhythm.      Heart sounds: Normal heart sounds.   Pulmonary:      Effort: Pulmonary effort is normal. No respiratory distress.      Breath sounds: Rales present. No wheezing.   Abdominal:      General: Bowel sounds are normal. There is no distension.      Palpations: Abdomen is soft.      Tenderness: There is no abdominal tenderness.   Musculoskeletal:         General: No tenderness. Normal range of motion.      Cervical back: Normal range of motion and neck supple.      Right lower leg: No edema.      Left lower leg: No edema.   Lymphadenopathy:      Cervical: No cervical adenopathy.   Skin:     General: Skin is warm and dry.      Capillary Refill: Capillary refill takes less than 2 seconds.      Findings: No rash.   Neurological:      General: No focal deficit present.      Mental Status: She is oriented to person, place, and time. She is lethargic and confused.   Psychiatric:         Mood and Affect: Mood normal.         Behavior: Behavior normal.       Vents:  Oxygen Concentration (%): 50 (10/24/22 2200)  Lines/Drains/Airways       Central Venous Catheter Line  Duration             Trialysis (Dialysis) Catheter 10/25/22 2329 left internal jugular <1 day              Drain  Duration                  Urethral Catheter 10/23/22 2230 16 Fr. 2 days              Peripheral Intravenous Line  Duration                  Midline Catheter Insertion/Assessment  - Single Lumen 10/18/22 1831 Left brachial vein 18g x 10cm 7 days                  Significant Labs:    CBC/Anemia Profile:  Recent Labs   Lab 10/25/22  0325 10/25/22  1557 10/26/22  0249   WBC 43.48* 42.24* 38.52*   HGB 8.9* 7.9* 7.3*   HCT 25.0* 23.1* 21.8*   * 600* 562*   MCV 80* 81* 83   RDW 13.8 14.2 14.5        Chemistries:  Recent Labs   Lab 10/25/22  0325 10/25/22  1416 10/26/22  0249 10/26/22  1233   * 134* 135* 136   K 4.1 4.1 4.2 4.3     103 106 105   CO2 16* 15* 16* 15*   * 140* 154* 172*   CREATININE 4.4* 5.1* 5.7* 6.0*   CALCIUM 8.8 8.5* 8.3* 8.2*   ALBUMIN 1.8* 1.8* 1.8*  --    PROT 7.3 7.1 6.4  --    BILITOT 2.1* 2.0* 1.7*  --    ALKPHOS 160* 180* 136*  --    * 113* 85*  --    * 97* 57*  --    MG 2.8*  --  3.0* 3.2*   PHOS 4.7*  --  6.3*  --        CMP:   Recent Labs   Lab 10/25/22  0325 10/25/22  1416 10/26/22  0249 10/26/22  1233   * 134* 135* 136   K 4.1 4.1 4.2 4.3    103 106 105   CO2 16* 15* 16* 15*   * 187* 171* 181*   * 140* 154* 172*   CREATININE 4.4* 5.1* 5.7* 6.0*   CALCIUM 8.8 8.5* 8.3* 8.2*   PROT 7.3 7.1 6.4  --    ALBUMIN 1.8* 1.8* 1.8*  --    BILITOT 2.1* 2.0* 1.7*  --    ALKPHOS 160* 180* 136*  --    * 97* 57*  --    * 113* 85*  --    ANIONGAP 12 16 13 16     All pertinent labs within the past 24 hours have been reviewed.    Significant Imaging:  I have reviewed all pertinent imaging results/findings within the past 24 hours.      AB  Recent Labs   Lab 10/26/22  1201   PH 7.263*   PO2 39*   PCO2 40.9   HCO3 18.5*   BE -9     Assessment/Plan:     Pulmonary  Acute hypoxemic respiratory failure  Multifocal Pneumonia  Hemoptysis      78 yo PMHx HTN, hypothyroid, HFpEF, OA admitted for fevers and respiratory symptoms, treated while on Hospital Medicine for multifocal PNA. Course complicated by GIB bleed (no EGD yet d/t PNA), hyponatremia, ATN. MICU consulted for rapidly increasing oxygen requirement (4L NC to BiPAP 15/10 50%), respiratory distress/work of breathing, somnolence. Nephrology consulted for ATN, concerns for possible nephritic disease as  behind ATN d/t acanthrocytes. Reports of scant hemoptysis by nursing staff 10/21, 10/22.    Imaging: CXR: Diffuse bilateral airspace infiltrates w/ c/f alveolar fillings; CT chest wc 10/17 with bl perihilar dense consolidations w/ peripheral patcy areas of GGO, BL PNA, less c/f cardiogenic pulmonary  edema.  Labs: WBC uptrending 28.97, Hgb lowest 6.0 (s/p 2 u PRBC), continues to downtrend approx 1 point / day. sCr uptrending, (baseline 1.0-1.2). .      Recent Labs     10/26/22  1201   PH 7.263*   PCO2 40.9   PO2 39*   HCO3 18.5*   POCSATURATED 66*   BE -9     Etiology: Given concerns for GN per nephrology, recent hemoptysis, concerns for DAH. Concerns for possible PE as patient was not on thrombotic ppx s/o GIB. Incomplete I/Os charted, though reports of low UOP also renders pulmonary congestion edema as a possibility. Less c/f acute progression of multifocal PNA as adequate ABx coverage and no new fevers recently.    -- US DVT BLE stat  -- Consideration for urgent BAL +/- repeat chest imaging (last done 10/17)  -- Solumedrol IV to address possible ANCA vasculitis vs anti-GBM   -- Rheumatology consulted, recommended IV solumedrol followed by pred 60 and prophylactic bactrim.  -- Per nephrology and rheumatology, blood bank consulted for plasmapharesis.  -- Plasmapheresis done 10/26, plans for 7 more cycles over 14 days  -- Continue cefepime for treatment of multifocal PNA; ID following  -- Diuretic trial PRN  -- Wean supplemental O2 as tolerated  -- Neuro checks  -- supplemental O2 prn    Cardiac/Vascular  Chronic diastolic heart failure  Pulmonary Hypertension    TTE (10/2022) EF 65%, G1DD  Home meds: Lisinopril, Toprol  Dry weight: N/A    -- Diuretic plan per Acute Hypoxemic Respiratory Failure A/P  -- Daily weights (standing if tolerated)  -- Strict I/Os; goal net negative 1.5 - 2 L / day  -- Cardiac diet w/ 2 g Na restriction      HTN (hypertension)  -- Continue home medications as tolerated  -- pt not tolerating PO medications, prn hydralazine ordered    Renal/  Hyponatremia  Patient presents with Na 126, baseline mid/high 130s. Now recovered to baseline. Symptoms include: None. Volume status: Eu    -- Urine Na, Osm  -- Serum Osm  -- Trend Na; goal correction < 6-8 units / 24 hours  -- legionella  ordered    Acute renal failure superimposed on stage 3 chronic kidney disease  Elevated CPK    Patient w/o CKD presenting with WILFREDO with rapidly increasing sCr (baseline sCr 1.0-1.2). New onset proteinuria/hematuria in last 30 days. .  DDx: ATN vs GN.     Serum creatinine: 6 mg/dL (H) 10/26/22 1233  Estimated creatinine clearance: 6.9 mL/min (A)    -- F/U serologies per Nephro (MITUL, ANCA, anti-GBM, IgA, C3, C4, HCV, HBV, cryo, RF)  -- Renal biopsy done today  -- IV Solumedrol 1mg x3 days completed. Now continuing lower dose IV solumedrol in setting of pt's intolerance for PO meds. Will transition to PO pred once able.  -- Trend sCr  -- Strict I&Os  -- Avoid nephrotoxic agents  -- Renally adjust medications  -- added bicarb today  -- Nephro and Rheumatology following      Immunology/Multi System  * Microscopic polyangiitis  As of 10/26/22 strongly positive MPO Ab (in contrast to borderline positive PR3), consistent with clinical picture of microscopic polyangiitis with pulmonary, and renal involvement. Trialysis catheter placed overnight with plans for plasmapheresis early this AM. Pt tolerated procedure well despite multiple attempts to place line. Went for IR Renal biopsy this AM. Tolerated procedure well. Endorsing burning at szymanski insertion site. Continuing Azithromycin and Cefepime. Rheumatology planning to start rituximab and cyclophosphamide.     - Underwent Plasmapheresis 10/26 w/ plans for 7 treatments over 14-day period (Schedule: Wed, Thurs, Fri, Sun, Tues, Wed, Fri)  - rituximab and cyclophosphamide  - nephrology consulted  - rheumatology consulted  - continuing IV solumedrol given patient's intolerance to PO meds, will transition to Pred 60mg daily once able.        Oncology  Acute blood loss anemia  resolved    Per GI, bleed remote, no evidence of acute re-bleed; patient has had normal stool for 10-14 days. No EGD this admission d/t PNA.    -- Continue recommended PO PPI QD  -- Monitor for  bleed  -- Trend CBC; transfuse Hgb < 7  -- type and screen ordered    Endocrine  Hypothyroid  -- Continue home medications as tolerated       Critical Care Daily Checklist:    A: Awake: RASS Goal/Actual Goal:    Actual: [place] Agitation Sedation Scale (RASS): Restless   B: Spontaneous Breathing Trial Performed?     C: SAT & SBT Coordinated?  na                      D: Delirium: CAM-ICU Overall CAM-ICU: Positive   E: Early Mobility Performed? No   F: Feeding Goal:    Status:     Current Diet Order   Procedures    Diet renal      AS: Analgesia/Sedation Precedex, weaning   T: Thromboembolic Prophylaxis no   H: HOB > 300 Yes   U: Stress Ulcer Prophylaxis (if needed) yes   G: Glucose Control yes   B: Bowel Function Stool Occurrence: 1   I: Indwelling Catheter (Lines & Curry) Necessity LIJ <1 day  Midline x7 days  Urethral catheter x2 days   D: De-escalation of Antimicrobials/Pharmacotherapies no    Plan for the day/ETD Plex, renal biopsy    Code Status:  Family/Goals of Care: Full Code  Family at bedside.       Critical secondary to Patient has a condition that poses threat to life and bodily function: microscopic polyangitis w/ renal failure and respiratory failure.     Critical care was time spent personally by me on the following activities: development of treatment plan with patient or surrogate and bedside caregivers, discussions with consultants, evaluation of patient's response to treatment, examination of patient, ordering and performing treatments and interventions, ordering and review of laboratory studies, ordering and review of radiographic studies, pulse oximetry, re-evaluation of patient's condition. This critical care time did not overlap with that of any other provider or involve time for any procedures.     Magui Cage MD  Critical Care Medicine  [facility] - Cardiac Medical ICU

## 2022-10-26 NOTE — ASSESSMENT & PLAN NOTE
Elevated CPK    Patient w/o CKD presenting with WILFREDO with rapidly increasing sCr (baseline sCr 1.0-1.2). New onset proteinuria/hematuria in last 30 days. .  DDx: ATN vs GN.     Serum creatinine: 6 mg/dL (H) 10/26/22 1233  Estimated creatinine clearance: 6.9 mL/min (A)    -- F/U serologies per Nephro (MITUL, ANCA, anti-GBM, IgA, C3, C4, HCV, HBV, cryo, RF)  -- Renal biopsy done today  -- IV Solumedrol 1mg x3 days completed. Now continuing lower dose IV solumedrol in setting of pt's intolerance for PO meds. Will transition to PO pred once able.  -- Trend sCr  -- Strict I&Os  -- Avoid nephrotoxic agents  -- Renally adjust medications  -- added bicarb today  -- Nephro and Rheumatology following

## 2022-10-26 NOTE — ASSESSMENT & PLAN NOTE
Patient with baseline creatinine of 1 as recent as August 2022 with progressive worsening beginning in early October 1.3 (10/13)->1.6 (10/17)->3.0 (10/23) despite IV fluids.  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  Patient noted to have new onset proteinuria and hematuria over the last 1 month.  Additionally, would consider ATN in the setting of suspected sepsis from multifocal pneumonia.     Concerns for glomerulonephritis given patient's current clinical picture. Initial urine microscopy demonstrating muddy brown casts with likely acanthocytes noted as well. MITUL positive, proteinase 3 ab positive as well. Patient s/p IV solumedrol x3 doses, now on prednisone 60mg qd. Underwent kidney bx today. 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 Kidney function continuing to worsen, /Cr 6.0.      Recommendations:  - urgent Plex for treatment of presumed GPA to start today   - Will evaluate need for HD tomorrow 10/27   - will spin urine today  - continue PO prednisone  - agree with Rituximab and cyclophosphamide per Rheum   - strict intake and output  - renally dose medications and avoid nephrotoxins when possible

## 2022-10-26 NOTE — ASSESSMENT & PLAN NOTE
Patient with worsening hyponatremia since admission.  Baseline sodium 140, on admission 135 worsening to 126 but subsequently up trending to 129 at the time of consultation (10/23). Improved to 135 on 10/26.     - daily BMP

## 2022-10-26 NOTE — PROCEDURES
Radiology Post-Procedure Note    Pre Op Diagnosis: Renal dysfunction  Post Op Diagnosis: Same    Procedure: CT guided native kidney biopsy    Procedure performed by: Cleveland Pagan MD    Written Informed Consent Obtained: Yes  Specimen Removed: YES 5 18g core specimens  Estimated Blood Loss: Minimal    Findings:   CT guided native right kidney biopsy performed with 17/18g coaxial core needle.  5 18g core specimens obtained.  Tract embolization with gelfoam slurry.  No complications.    Patient tolerated procedure well.    Cleveland Pagan MD  Diagnostic and Interventional Radiologist  Department of Radiology  Pager: [phone]

## 2022-10-26 NOTE — ASSESSMENT & PLAN NOTE
As of 10/26/22 strongly positive MPO Ab (in contrast to borderline positive PR3), consistent with clinical picture of microscopic polyangiitis with pulmonary, and renal involvement. Trialysis catheter placed overnight with plans for plasmapheresis early this AM. Pt tolerated procedure well despite multiple attempts to place line. Went for IR Renal biopsy this AM. Tolerated procedure well. Endorsing burning at szymanski insertion site. Continuing Azithromycin and Cefepime. Rheumatology planning to start rituximab and cyclophosphamide.     - Underwent Plasmapheresis 10/26 w/ plans for 7 treatments over 14-day period (Schedule: Wed, Thurs, Fri, Sun, Tues, Wed, Fri)  - rituximab and cyclophosphamide  - nephrology consulted  - rheumatology consulted  - continuing IV solumedrol given patient's intolerance to PO meds, will transition to Pred 60mg daily once able.

## 2022-10-26 NOTE — ASSESSMENT & PLAN NOTE
Multifocal Pneumonia  Hemoptysis      78 yo PMHx HTN, hypothyroid, HFpEF, OA admitted for fevers and respiratory symptoms, treated while on Hospital Medicine for multifocal PNA. Course complicated by GIB bleed (no EGD yet d/t PNA), hyponatremia, ATN. MICU consulted for rapidly increasing oxygen requirement (4L NC to BiPAP 15/10 50%), respiratory distress/work of breathing, somnolence. Nephrology consulted for ATN, concerns for possible nephritic disease as  behind ATN d/t acanthrocytes. Reports of scant hemoptysis by nursing staff 10/21, 10/22.    Imaging: CXR: Diffuse bilateral airspace infiltrates w/ c/f alveolar fillings; CT chest wc 10/17 with bl perihilar dense consolidations w/ peripheral patcy areas of GGO, BL PNA, less c/f cardiogenic pulmonary edema.  Labs: WBC uptrending 28.97, Hgb lowest 6.0 (s/p 2 u PRBC), continues to downtrend approx 1 point / day. sCr uptrending, (baseline 1.0-1.2). .      Recent Labs     10/26/22  1201   PH 7.263*   PCO2 40.9   PO2 39*   HCO3 18.5*   POCSATURATED 66*   BE -9     Etiology: Given concerns for GN per nephrology, recent hemoptysis, concerns for DAH. Concerns for possible PE as patient was not on thrombotic ppx s/o GIB. Incomplete I/Os charted, though reports of low UOP also renders pulmonary congestion edema as a possibility. Less c/f acute progression of multifocal PNA as adequate ABx coverage and no new fevers recently.    -- US DVT BLE stat  -- Consideration for urgent BAL +/- repeat chest imaging (last done 10/17)  -- Solumedrol IV to address possible ANCA vasculitis vs anti-GBM   -- Rheumatology consulted, recommended IV solumedrol followed by pred 60 and prophylactic bactrim.  -- Per nephrology and rheumatology, blood bank consulted for plasmapharesis.  -- Plasmapheresis done 10/26, plans for 7 more cycles over 14 days  -- Continue cefepime for treatment of multifocal PNA; ID following  -- Diuretic trial PRN  -- Wean supplemental O2 as tolerated  --  Neuro checks  -- supplemental O2 prn

## 2022-10-26 NOTE — EICU
Rounding (Video Assessment):  No    Intervention Initiated From:  Bedside    Aiden Communicated with Bedside Nurse regarding:  Time-Out    Nurse Notified:  No    Doctor Notified:  No    Comments: eLert received for time out for left IJ trialysis insertion by Dr. SANTO Russo. LDA and PCXR entered. Elapsed time 10 minutes.

## 2022-10-26 NOTE — PROCEDURES
"Krsitin Goodman is a 77 y.o. female patient.    Temp: 97.9 °F (36.6 °C) (10/25/22 1900)  Pulse: 66 (10/25/22 1900)  Resp: 19 (10/25/22 1900)  BP: 129/62 (10/25/22 1900)  SpO2: 95 % (10/25/22 1900)  Weight: 63.5 kg (139 lb 15.9 oz) (10/17/22 1633)  Height: 5' 1" (154.9 cm) (10/17/22 1633)       Central Line    Date/Time: 10/25/2022 7:47 PM  Performed by: Magui Cage MD  Authorized by: Magui Cage MD     Location procedure was performed:  [facility] CRITICAL CARE MEDICINE  Assisting Provider:  Timothy Montoya, DO  Consent Done ?:  Yes  Time out complete?: Verified correct patient, procedure, equipment, staff, and site/side    Indications:  Hemodialysis  Anesthesia:  Local infiltration  Local anesthetic:  Lidocaine 2% without epinephrine  Anesthetic total (ml):  10  Preparation:  Skin prepped with ChloraPrep  Skin prep agent dried: Skin prep agent completely dried prior to procedure    Sterile barriers: All five maximal sterile barriers used - gloves, gown, cap, mask and large sterile sheet    Hand hygiene: Hand hygiene performed immediately prior to central venous catheter insertion    Location:  Right internal jugular  Catheter type:  Trialysis  Catheter size:  7.5 Fr  Ultrasound guidance: Yes    Vessel Caliber:  Medium and small   patent  Comprressibility:  Normal  Doppler:  Not done  Needle advanced into vessel with real time ultrasound guidance.    Steril sheath on probe.    Sterile gel used.  Manometry: Yes    Number of attempts:  2  Complications: No    Estimated blood loss (mL):  5  Specimens: No    Implants: No    Adverse Events:  None  Other Complications:  Failed attempt, guidewire misplaced after needle confirmed in vessel.   Failed attempt, guidewire misplaced after needle confirmed in vessel.    10/25/2022    "

## 2022-10-26 NOTE — ASSESSMENT & PLAN NOTE
Patient presents with Na 126, baseline mid/high 130s. Now recovered to baseline. Symptoms include: None. Volume status: Eu    -- Urine Na, Osm  -- Serum Osm  -- Trend Na; goal correction < 6-8 units / 24 hours  -- legionella ordered

## 2022-10-26 NOTE — PLAN OF CARE
Pt arrived to IR room 173 via bed on monitor w/ RN & tech. Name//allergies/procedure verified w/primary nurse/chart/armband. Pt will be monitored by RN & CRNA @ bedside throughout procedure/sedation. Sedation/airway/vs per anesthesia team.

## 2022-10-26 NOTE — ASSESSMENT & PLAN NOTE
resolved    Per GI, bleed remote, no evidence of acute re-bleed; patient has had normal stool for 10-14 days. No EGD this admission d/t PNA.    -- Continue recommended PO PPI QD  -- Monitor for bleed  -- Trend CBC; transfuse Hgb < 7  -- type and screen ordered

## 2022-10-26 NOTE — PLAN OF CARE
Patient seen and examined in conjunction with house staff. I have reviewed resident's note and pertinent laboratory and imaging studies. I agree with findings including diagnostic interpretation and medical decision making as written.     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 S/P pulse dose corticosteroids x 3d   Acute hypoxemic respiratory failure- Chest imaging c/w JOVITA in setting of MPA- Remains on 8L NC   Encephalopathy- more somnolent this AM..Likely sedation related post procedure.   WILFREDO- vasculitis + ATN component. Not oliguric, but continues to worsen. S/P renal biopsy with IR this AM.. Appreciate assistance.   Leukocytosis- suspect reactive in setting of vasculitis+ steroids. Infectious evaluation negative thus far. Given degree of IS planned we will complete empiric 7 days therapy   Anemia- Hb down trending.   Hypothyroidism   HFpEF   H/o HTN     PLAN:  .Drop methylpred to 50 daily.   Plasmapheresis today. CT/RTX per rheum. Appreciate assistance.   Start PJP PPX   Synthroid?- Unclear if taking at home. Repeat TSH and clarify   Hb transfusion threshold <7   Trend RFP/UOP. Renal dose all meds and avid additional nephrotoxic agents. Nephrology following.. MAy need RRT   Complete course of Cefepime + Azithro. Follow infectious labs/cultures for completeness.   Wean supplemental O2 targeting SpO2 >90%   Hold sedating meds today.. Check VBG to exclude hypercapnia.   PPI.   PO diet as tolerated.   Pharmacologic PPX contraindicated     Discussed with patients sister at time of rounds. Will update daughter by phone.     Critical Care Time: 55 minutes  Critical care was time spent personally by me on the following activities: evaluating this patient's organ dysfunction, development of treatment plan, discussing treatment plan with patient or surrogate and bedside caregivers, discussions with consultants, evaluation of patient's response to treatment, examination of patient, ordering and  performing treatments and interventions, ordering and review of laboratory studies, ordering and review of radiographic studies, re-evaluation of patient's condition. This critical care time did not overlap with that of any other provider or involve time for any procedures.       Fox Holbrook M.D.   [facility]

## 2022-10-26 NOTE — ASSESSMENT & PLAN NOTE
"Patient is a 76 yo F with chronic diastolic heart failure, HTN, OA, who is admitted for respiratory failure. Rheumatology is consulted due to concern for vasculitis.    Patient presented with cough and hemoptysis since 9/24/22. She was admitted due to dyspnea and hypoxia on 10/17. She has had Hb drop to 6 this admission requiring blood transfusions. GI defers invasive workup for now because she is not stable enough. She has had increasing creatinine from baseline of 1 to 3.0 on 10/23/22. Nephrology concerned for ATN nephritic picture in urine sediment.    Reviewed her chest imaging which shows progressive disease from 10/14 until now which can be concerning for DAH. This might also explain the Hb drop. No bronch planned for now due to her tenuous respiratory status.    CBC: 17 WBCs, Hb 14 now (s/p transfusion), plt 495  CMP: creatinine 3.5, GFR 13. AST//164  UA: 1+ blood, 88 RBCs, 18 WBCs  UPCR 1.54  Complements normal  RF and CCP negative    MITUL+ 1:2560 homogenous, profile pending  PR3 slightly elevated at 1.2 (reference positive is 1.0)  MPO elevated at 132  GBM antibodies negative    Pending: ANCAs, cryoglobulins    IV Solumedrol 1000 mg daily, day 2 of 3    Hb continues to decrease. Cr worsened to 5.7. Still on 7-8 L nasal cannula. PLEX starting today. Consents for rituximab and cyclophosphamide signed and scanned into the chart. Got kidney biopsy today.      Recommendations:  1. Follow up Quantiferon and other infectious labs.  2. Follow up kidney biopsy  3. PO prednisone 60 mg daily.  4. Please start Bactrim-DS three times weekly or atovaquone 1500 mg daily while on high dose steroids.  5. Continue Protonix daily while on high dose steroids.  6. Will plan for Rituximab 375 mg/m^2 weekly for 4 doses. Cyclophosphamide 7.5 mg/kg added on weeks 1 and 3. Consent forms signed and scanned into chart (check Media tab and sort by "Date Uploaded").      Discussed with attending physician, Dr. Palacio. Attending " attestation to follow.    Layne Aguirre MD  Rheumatology Fellow  PGY-5

## 2022-10-26 NOTE — PROCEDURES
"Kristin Goodman is a 77 y.o. female patient.    Temp: 98.1 °F (36.7 °C) (10/25/22 2300)  Pulse: 67 (10/26/22 0000)  Resp: (!) 29 (10/26/22 0000)  BP: 136/63 (10/26/22 0000)  SpO2: 98 % (10/26/22 0000)  Weight: 63.5 kg (139 lb 15.9 oz) (10/17/22 1633)  Height: 5' 1" (154.9 cm) (10/17/22 1633)       Central Line - Trialysis    Date/Time: 10/26/2022 1:42 AM  Performed by: Trever Russo MD  Authorized by: Trever Russo MD     Location procedure was performed:  [facility]  Pre-operative diagnosis:  Vasculitis; need for PLEX  Post-operative diagnosis:  Vasculitis; need for PLEX  Consent Done ?:  Yes  Time out complete?: Verified correct patient, procedure, equipment, staff, and site/side    Indications:  Med administration, vascular access and hemodialysis (PLEX)  Anesthesia:  Local infiltration  Local anesthetic:  Lidocaine 1% without epinephrine  Anesthetic total (ml):  4  Preparation:  Skin prepped with ChloraPrep  Skin prep agent dried: Skin prep agent completely dried prior to procedure    Sterile barriers: All five maximal sterile barriers used - gloves, gown, cap, mask and large sterile sheet    Hand hygiene: Hand hygiene performed immediately prior to central venous catheter insertion    Location:  Left internal jugular  Catheter type:  Trialysis  Catheter size:  13 Fr  Inserted Catheter Length (cm):  24  Ultrasound guidance: Yes    Vessel Caliber:  Small  Needle advanced into vessel with real time ultrasound guidance.    Guidewire confirmed in vessel.    Steril sheath on probe.    Sterile gel used.  Manometry: Yes    Number of attempts:  1  Securement:  Line sutured, chlorhexidine patch, sterile dressing applied and blood return through all ports  Technical Procedures Used:  Seldinger  XRay:  Placement verified by x-ray  Complications: initial cather placement was turned, withdrew 3cm and repeat CXR confirms still in place.    Trever Russo MD  Pulmonary & Critical Care Medicine Fellow    "

## 2022-10-27 PROBLEM — J18.9 SEPSIS DUE TO PNEUMONIA: Status: RESOLVED | Noted: 2022-10-17 | Resolved: 2022-10-27

## 2022-10-27 PROBLEM — R65.21 SEPTIC SHOCK: Status: ACTIVE | Noted: 2022-10-27

## 2022-10-27 PROBLEM — E44.0 MODERATE MALNUTRITION: Status: ACTIVE | Noted: 2022-10-27

## 2022-10-27 PROBLEM — A41.9 SEPSIS DUE TO PNEUMONIA: Status: RESOLVED | Noted: 2022-10-17 | Resolved: 2022-10-27

## 2022-10-27 PROBLEM — R74.8 ELEVATED CPK: Chronic | Status: RESOLVED | Noted: 2019-04-27 | Resolved: 2022-10-27

## 2022-10-27 PROBLEM — A41.9 SEPTIC SHOCK: Status: ACTIVE | Noted: 2022-10-27

## 2022-10-27 LAB
ALBUMIN SERPL BCP-MCNC: 3 G/DL (ref 3.5–5.2)
ALBUMIN SERPL BCP-MCNC: 3.2 G/DL (ref 3.5–5.2)
ALP SERPL-CCNC: 95 U/L (ref 55–135)
ALT SERPL W/O P-5'-P-CCNC: 48 U/L (ref 10–44)
ANCA AB TITR SER IF: ABNORMAL TITER
ANION GAP SERPL CALC-SCNC: 17 MMOL/L (ref 8–16)
ANION GAP SERPL CALC-SCNC: 17 MMOL/L (ref 8–16)
ANISOCYTOSIS BLD QL SMEAR: SLIGHT
AST SERPL-CCNC: 53 U/L (ref 10–40)
BASOPHILS # BLD AUTO: ABNORMAL K/UL (ref 0–0.2)
BASOPHILS NFR BLD: 0 % (ref 0–1.9)
BILIRUB SERPL-MCNC: 1.9 MG/DL (ref 0.1–1)
BLD PROD TYP BPU: NORMAL
BLOOD UNIT EXPIRATION DATE: NORMAL
BLOOD UNIT TYPE CODE: 7300
BLOOD UNIT TYPE: NORMAL
BUN SERPL-MCNC: 159 MG/DL (ref 8–23)
BUN SERPL-MCNC: 164 MG/DL (ref 8–23)
CALCIUM SERPL-MCNC: 8 MG/DL (ref 8.7–10.5)
CALCIUM SERPL-MCNC: 8.4 MG/DL (ref 8.7–10.5)
CHLORIDE SERPL-SCNC: 103 MMOL/L (ref 95–110)
CHLORIDE SERPL-SCNC: 98 MMOL/L (ref 95–110)
CO2 SERPL-SCNC: 20 MMOL/L (ref 23–29)
CO2 SERPL-SCNC: 24 MMOL/L (ref 23–29)
CODING SYSTEM: NORMAL
CREAT SERPL-MCNC: 5.7 MG/DL (ref 0.5–1.4)
CREAT SERPL-MCNC: 6.1 MG/DL (ref 0.5–1.4)
DIFFERENTIAL METHOD: ABNORMAL
DISPENSE STATUS: NORMAL
EOSINOPHIL # BLD AUTO: ABNORMAL K/UL (ref 0–0.5)
EOSINOPHIL NFR BLD: 1 % (ref 0–8)
ERYTHROCYTE [DISTWIDTH] IN BLOOD BY AUTOMATED COUNT: 14.4 % (ref 11.5–14.5)
EST. GFR  (NO RACE VARIABLE): 6.7 ML/MIN/1.73 M^2
EST. GFR  (NO RACE VARIABLE): 7.3 ML/MIN/1.73 M^2
GAMMA INTERFERON BACKGROUND BLD IA-ACNC: 0.01 IU/ML
GLUCOSE SERPL-MCNC: 177 MG/DL (ref 70–110)
GLUCOSE SERPL-MCNC: 216 MG/DL (ref 70–110)
HCT VFR BLD AUTO: 22.3 % (ref 37–48.5)
HGB BLD-MCNC: 7.6 G/DL (ref 12–16)
IMM GRANULOCYTES # BLD AUTO: ABNORMAL K/UL (ref 0–0.04)
IMM GRANULOCYTES NFR BLD AUTO: ABNORMAL % (ref 0–0.5)
LYMPHOCYTES # BLD AUTO: ABNORMAL K/UL (ref 1–4.8)
LYMPHOCYTES NFR BLD: 5 % (ref 18–48)
M TB IFN-G CD4+ BCKGRND COR BLD-ACNC: -0.01 IU/ML
MAGNESIUM SERPL-MCNC: 2.9 MG/DL (ref 1.6–2.6)
MAGNESIUM SERPL-MCNC: 3.1 MG/DL (ref 1.6–2.6)
MCH RBC QN AUTO: 28 PG (ref 27–31)
MCHC RBC AUTO-ENTMCNC: 34.1 G/DL (ref 32–36)
MCV RBC AUTO: 82 FL (ref 82–98)
METAMYELOCYTES NFR BLD MANUAL: 3 %
MITOGEN IGNF BCKGRD COR BLD-ACNC: 0.07 IU/ML
MONOCYTES # BLD AUTO: ABNORMAL K/UL (ref 0.3–1)
MONOCYTES NFR BLD: 6 % (ref 4–15)
MYELOCYTES NFR BLD MANUAL: 2 %
NEUTROPHILS NFR BLD: 80 % (ref 38–73)
NEUTS BAND NFR BLD MANUAL: 2 %
NRBC BLD-RTO: 3 /100 WBC
NUM UNITS TRANS FFP: NORMAL
OVALOCYTES BLD QL SMEAR: ABNORMAL
P-ANCA TITR SER IF: ABNORMAL TITER
PHOSPHATE SERPL-MCNC: 8 MG/DL (ref 2.7–4.5)
PHOSPHATE SERPL-MCNC: 8.6 MG/DL (ref 2.7–4.5)
PLATELET # BLD AUTO: 477 K/UL (ref 150–450)
PLATELET BLD QL SMEAR: ABNORMAL
PMV BLD AUTO: 11 FL (ref 9.2–12.9)
POCT GLUCOSE: 142 MG/DL (ref 70–110)
POCT GLUCOSE: 150 MG/DL (ref 70–110)
POCT GLUCOSE: 201 MG/DL (ref 70–110)
POCT GLUCOSE: 249 MG/DL (ref 70–110)
POIKILOCYTOSIS BLD QL SMEAR: SLIGHT
POTASSIUM SERPL-SCNC: 3.7 MMOL/L (ref 3.5–5.1)
POTASSIUM SERPL-SCNC: 4.1 MMOL/L (ref 3.5–5.1)
PROMYELOCYTES NFR BLD MANUAL: 1 %
PROT SERPL-MCNC: 6.4 G/DL (ref 6–8.4)
RBC # BLD AUTO: 2.71 M/UL (ref 4–5.4)
SODIUM SERPL-SCNC: 139 MMOL/L (ref 136–145)
SODIUM SERPL-SCNC: 140 MMOL/L (ref 136–145)
TARGETS BLD QL SMEAR: ABNORMAL
TB GOLD PLUS: ABNORMAL
TB2 - NIL: -0.01 IU/ML
TSH SERPL DL<=0.005 MIU/L-ACNC: 0.58 UIU/ML (ref 0.4–4)
WBC # BLD AUTO: 40.36 K/UL (ref 3.9–12.7)

## 2022-10-27 PROCEDURE — 63600175 PHARM REV CODE 636 W HCPCS

## 2022-10-27 PROCEDURE — 25000003 PHARM REV CODE 250: Performed by: INTERNAL MEDICINE

## 2022-10-27 PROCEDURE — 80069 RENAL FUNCTION PANEL: CPT | Performed by: STUDENT IN AN ORGANIZED HEALTH CARE EDUCATION/TRAINING PROGRAM

## 2022-10-27 PROCEDURE — 80053 COMPREHEN METABOLIC PANEL: CPT

## 2022-10-27 PROCEDURE — 86481 TB AG RESPONSE T-CELL SUSP: CPT | Performed by: STUDENT IN AN ORGANIZED HEALTH CARE EDUCATION/TRAINING PROGRAM

## 2022-10-27 PROCEDURE — 85027 COMPLETE CBC AUTOMATED: CPT

## 2022-10-27 PROCEDURE — 27000221 HC OXYGEN, UP TO 24 HOURS

## 2022-10-27 PROCEDURE — 94761 N-INVAS EAR/PLS OXIMETRY MLT: CPT

## 2022-10-27 PROCEDURE — 99231 PR SUBSEQUENT HOSPITAL CARE,LEVL I: ICD-10-PCS | Mod: ,,, | Performed by: INTERNAL MEDICINE

## 2022-10-27 PROCEDURE — 20000000 HC ICU ROOM

## 2022-10-27 PROCEDURE — P9017 PLASMA 1 DONOR FRZ W/IN 8 HR: HCPCS | Performed by: PATHOLOGY

## 2022-10-27 PROCEDURE — 36514 APHERESIS PLASMA: CPT

## 2022-10-27 PROCEDURE — 25000003 PHARM REV CODE 250: Performed by: STUDENT IN AN ORGANIZED HEALTH CARE EDUCATION/TRAINING PROGRAM

## 2022-10-27 PROCEDURE — 36514 APHERESIS PLASMA: CPT | Mod: ,,, | Performed by: PATHOLOGY

## 2022-10-27 PROCEDURE — 84443 ASSAY THYROID STIM HORMONE: CPT | Performed by: STUDENT IN AN ORGANIZED HEALTH CARE EDUCATION/TRAINING PROGRAM

## 2022-10-27 PROCEDURE — 83735 ASSAY OF MAGNESIUM: CPT | Mod: 91 | Performed by: STUDENT IN AN ORGANIZED HEALTH CARE EDUCATION/TRAINING PROGRAM

## 2022-10-27 PROCEDURE — 99291 PR CRITICAL CARE, E/M 30-74 MINUTES: ICD-10-PCS | Mod: GC,,, | Performed by: EMERGENCY MEDICINE

## 2022-10-27 PROCEDURE — 99900035 HC TECH TIME PER 15 MIN (STAT)

## 2022-10-27 PROCEDURE — 63600175 PHARM REV CODE 636 W HCPCS: Performed by: STUDENT IN AN ORGANIZED HEALTH CARE EDUCATION/TRAINING PROGRAM

## 2022-10-27 PROCEDURE — 83735 ASSAY OF MAGNESIUM: CPT

## 2022-10-27 PROCEDURE — 63600175 PHARM REV CODE 636 W HCPCS: Mod: JG | Performed by: INTERNAL MEDICINE

## 2022-10-27 PROCEDURE — 85007 BL SMEAR W/DIFF WBC COUNT: CPT

## 2022-10-27 PROCEDURE — 36514 PR THER APHERESIS,PLASMA PHERESIS: ICD-10-PCS | Mod: ,,, | Performed by: PATHOLOGY

## 2022-10-27 PROCEDURE — 63600175 PHARM REV CODE 636 W HCPCS: Performed by: PATHOLOGY

## 2022-10-27 PROCEDURE — 25000003 PHARM REV CODE 250

## 2022-10-27 PROCEDURE — 84100 ASSAY OF PHOSPHORUS: CPT

## 2022-10-27 PROCEDURE — 25000003 PHARM REV CODE 250: Performed by: PATHOLOGY

## 2022-10-27 PROCEDURE — C9113 INJ PANTOPRAZOLE SODIUM, VIA: HCPCS | Performed by: STUDENT IN AN ORGANIZED HEALTH CARE EDUCATION/TRAINING PROGRAM

## 2022-10-27 PROCEDURE — 99291 CRITICAL CARE FIRST HOUR: CPT | Mod: GC,,, | Performed by: EMERGENCY MEDICINE

## 2022-10-27 PROCEDURE — 90945 DIALYSIS ONE EVALUATION: CPT

## 2022-10-27 PROCEDURE — 27100171 HC OXYGEN HIGH FLOW UP TO 24 HOURS

## 2022-10-27 PROCEDURE — 99231 SBSQ HOSP IP/OBS SF/LOW 25: CPT | Mod: ,,, | Performed by: INTERNAL MEDICINE

## 2022-10-27 RX ORDER — HYDROMORPHONE HYDROCHLORIDE 1 MG/ML
1 INJECTION, SOLUTION INTRAMUSCULAR; INTRAVENOUS; SUBCUTANEOUS ONCE
Status: COMPLETED | OUTPATIENT
Start: 2022-10-27 | End: 2022-10-27

## 2022-10-27 RX ORDER — SODIUM CHLORIDE 9 MG/ML
INJECTION, SOLUTION INTRAVENOUS CONTINUOUS
Status: CANCELLED | OUTPATIENT
Start: 2022-10-27

## 2022-10-27 RX ORDER — HEPARIN SODIUM 1000 [USP'U]/ML
3200 INJECTION, SOLUTION INTRAVENOUS; SUBCUTANEOUS ONCE
Status: COMPLETED | OUTPATIENT
Start: 2022-10-30 | End: 2022-10-30

## 2022-10-27 RX ORDER — HEPARIN SODIUM 1000 [USP'U]/ML
3200 INJECTION, SOLUTION INTRAVENOUS; SUBCUTANEOUS ONCE
Status: COMPLETED | OUTPATIENT
Start: 2022-10-29 | End: 2022-10-29

## 2022-10-27 RX ORDER — DIPHENHYDRAMINE HYDROCHLORIDE 50 MG/ML
25 INJECTION INTRAMUSCULAR; INTRAVENOUS ONCE
Status: COMPLETED | OUTPATIENT
Start: 2022-10-30 | End: 2022-10-30

## 2022-10-27 RX ORDER — MAGNESIUM SULFATE HEPTAHYDRATE 40 MG/ML
2 INJECTION, SOLUTION INTRAVENOUS
Status: ACTIVE | OUTPATIENT
Start: 2022-10-27 | End: 2022-10-28

## 2022-10-27 RX ORDER — HEPARIN 100 UNIT/ML
500 SYRINGE INTRAVENOUS
Status: CANCELLED | OUTPATIENT
Start: 2022-10-27

## 2022-10-27 RX ORDER — ONDANSETRON 4 MG/1
8 TABLET, FILM COATED ORAL
Status: CANCELLED
Start: 2022-11-03

## 2022-10-27 RX ORDER — ATOVAQUONE 750 MG/5ML
1500 SUSPENSION ORAL DAILY
Status: DISCONTINUED | OUTPATIENT
Start: 2022-10-27 | End: 2022-10-27

## 2022-10-27 RX ORDER — ONDANSETRON 4 MG/1
8 TABLET, FILM COATED ORAL
Status: COMPLETED | OUTPATIENT
Start: 2022-10-27 | End: 2022-10-27

## 2022-10-27 RX ORDER — ATOVAQUONE 750 MG/5ML
1500 SUSPENSION ORAL DAILY
Status: DISCONTINUED | OUTPATIENT
Start: 2022-10-28 | End: 2022-12-03

## 2022-10-27 RX ORDER — ACETAMINOPHEN 325 MG/1
650 TABLET ORAL
Status: COMPLETED | OUTPATIENT
Start: 2022-10-27 | End: 2022-10-27

## 2022-10-27 RX ORDER — SODIUM CHLORIDE 0.9 % (FLUSH) 0.9 %
10 SYRINGE (ML) INJECTION
Status: CANCELLED | OUTPATIENT
Start: 2022-10-27

## 2022-10-27 RX ORDER — FAMOTIDINE 10 MG/ML
20 INJECTION INTRAVENOUS
Status: COMPLETED | OUTPATIENT
Start: 2022-10-27 | End: 2022-10-27

## 2022-10-27 RX ORDER — SODIUM CHLORIDE 0.9 % (FLUSH) 0.9 %
10 SYRINGE (ML) INJECTION
Status: DISCONTINUED | OUTPATIENT
Start: 2022-10-27 | End: 2022-12-13 | Stop reason: HOSPADM

## 2022-10-27 RX ORDER — HEPARIN 100 UNIT/ML
500 SYRINGE INTRAVENOUS
Status: CANCELLED | OUTPATIENT
Start: 2022-11-03

## 2022-10-27 RX ORDER — DIPHENHYDRAMINE HYDROCHLORIDE 50 MG/ML
25 INJECTION INTRAMUSCULAR; INTRAVENOUS ONCE
Status: COMPLETED | OUTPATIENT
Start: 2022-10-27 | End: 2022-10-27

## 2022-10-27 RX ORDER — MEPERIDINE HYDROCHLORIDE 50 MG/ML
25 INJECTION INTRAMUSCULAR; INTRAVENOUS; SUBCUTANEOUS
Status: DISPENSED | OUTPATIENT
Start: 2022-10-27 | End: 2022-10-28

## 2022-10-27 RX ORDER — HYDROCODONE BITARTRATE AND ACETAMINOPHEN 500; 5 MG/1; MG/1
TABLET ORAL CONTINUOUS
Status: ACTIVE | OUTPATIENT
Start: 2022-10-27 | End: 2022-10-28

## 2022-10-27 RX ORDER — DIPHENHYDRAMINE HYDROCHLORIDE 50 MG/ML
25 INJECTION INTRAMUSCULAR; INTRAVENOUS ONCE
Status: COMPLETED | OUTPATIENT
Start: 2022-10-29 | End: 2022-10-29

## 2022-10-27 RX ORDER — ALBUMIN HUMAN 50 G/1000ML
50 SOLUTION INTRAVENOUS ONCE
Status: COMPLETED | OUTPATIENT
Start: 2022-10-30 | End: 2022-10-30

## 2022-10-27 RX ORDER — LEVOTHYROXINE SODIUM 25 UG/1
25 TABLET ORAL
Status: DISCONTINUED | OUTPATIENT
Start: 2022-10-28 | End: 2022-12-03

## 2022-10-27 RX ORDER — DEXMEDETOMIDINE HYDROCHLORIDE 4 UG/ML
0-1.4 INJECTION, SOLUTION INTRAVENOUS CONTINUOUS
Status: DISCONTINUED | OUTPATIENT
Start: 2022-10-27 | End: 2022-10-28

## 2022-10-27 RX ORDER — ACETAMINOPHEN 325 MG/1
650 TABLET ORAL EVERY 4 HOURS PRN
Status: CANCELLED | OUTPATIENT
Start: 2022-10-27

## 2022-10-27 RX ADMIN — DIPHENHYDRAMINE HYDROCHLORIDE 25 MG: 50 INJECTION, SOLUTION INTRAMUSCULAR; INTRAVENOUS at 11:10

## 2022-10-27 RX ADMIN — HEPARIN SODIUM 3200 UNITS: 1000 INJECTION, SOLUTION INTRAVENOUS; SUBCUTANEOUS at 11:10

## 2022-10-27 RX ADMIN — FUROSEMIDE 200 MG: 10 INJECTION, SOLUTION INTRAMUSCULAR; INTRAVENOUS at 03:10

## 2022-10-27 RX ADMIN — INSULIN ASPART 4 UNITS: 100 INJECTION, SOLUTION INTRAVENOUS; SUBCUTANEOUS at 05:10

## 2022-10-27 RX ADMIN — PANTOPRAZOLE SODIUM 40 MG: 40 INJECTION, POWDER, FOR SOLUTION INTRAVENOUS at 09:10

## 2022-10-27 RX ADMIN — METHYLPREDNISOLONE SODIUM SUCCINATE 50 MG: 40 INJECTION, POWDER, FOR SOLUTION INTRAMUSCULAR; INTRAVENOUS at 09:10

## 2022-10-27 RX ADMIN — METHYLPREDNISOLONE SODIUM SUCCINATE 50 MG: 40 INJECTION, POWDER, FOR SOLUTION INTRAMUSCULAR; INTRAVENOUS at 06:10

## 2022-10-27 RX ADMIN — DIPHENHYDRAMINE HYDROCHLORIDE 25 MG: 50 INJECTION, SOLUTION INTRAMUSCULAR; INTRAVENOUS at 06:10

## 2022-10-27 RX ADMIN — CYCLOPHOSPHAMIDE 480 MG: 200 INJECTION, SOLUTION INTRAVENOUS at 11:10

## 2022-10-27 RX ADMIN — ONDANSETRON 8 MG: 4 TABLET ORAL at 10:10

## 2022-10-27 RX ADMIN — AZITHROMYCIN 250 MG: 500 INJECTION, POWDER, LYOPHILIZED, FOR SOLUTION INTRAVENOUS at 09:10

## 2022-10-27 RX ADMIN — SODIUM CHLORIDE: 0.9 INJECTION, SOLUTION INTRAVENOUS at 03:10

## 2022-10-27 RX ADMIN — CHLOROTHIAZIDE SODIUM 500 MG: 500 INJECTION, POWDER, LYOPHILIZED, FOR SOLUTION INTRAVENOUS at 02:10

## 2022-10-27 RX ADMIN — ACETAMINOPHEN 650 MG: 325 TABLET ORAL at 06:10

## 2022-10-27 RX ADMIN — CEFEPIME HYDROCHLORIDE 1 G: 1 INJECTION, SOLUTION INTRAVENOUS at 12:10

## 2022-10-27 RX ADMIN — CALCIUM GLUCONATE 2 G: 20 INJECTION, SOLUTION INTRAVENOUS at 11:10

## 2022-10-27 RX ADMIN — RITUXIMAB 600 MG: 10 INJECTION, SOLUTION INTRAVENOUS at 06:10

## 2022-10-27 RX ADMIN — INSULIN ASPART 4 UNITS: 100 INJECTION, SOLUTION INTRAVENOUS; SUBCUTANEOUS at 01:10

## 2022-10-27 RX ADMIN — DEXMEDETOMIDINE HYDROCHLORIDE 0.3 MCG/KG/HR: 4 INJECTION INTRAVENOUS at 12:10

## 2022-10-27 RX ADMIN — ALTEPLASE 2 MG: 2.2 INJECTION, POWDER, LYOPHILIZED, FOR SOLUTION INTRAVENOUS at 04:10

## 2022-10-27 RX ADMIN — MESNA 96 MG: 100 INJECTION, SOLUTION INTRAVENOUS at 10:10

## 2022-10-27 RX ADMIN — HYDROMORPHONE HYDROCHLORIDE 1 MG: 1 INJECTION, SOLUTION INTRAMUSCULAR; INTRAVENOUS; SUBCUTANEOUS at 04:10

## 2022-10-27 RX ADMIN — FAMOTIDINE 20 MG: 10 INJECTION, SOLUTION INTRAVENOUS at 06:10

## 2022-10-27 NOTE — ASSESSMENT & PLAN NOTE
Patient with baseline creatinine of 1 as recent as August 2022 with progressive worsening beginning in early October 1.3 (10/13)->1.6 (10/17)->3.0 (10/23) despite IV fluids.  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  Patient noted to have new onset proteinuria and hematuria over the last 1 month.  Additionally, would consider ATN in the setting of suspected sepsis from multifocal pneumonia.     Concerns for glomerulonephritis given patient's current clinical picture. Initial urine microscopy demonstrating muddy brown casts with likely acanthocytes noted as well. MITUL positive, proteinase 3 ab weakly positive, MPO strongly positive. Patient s/p IV solumedrol x3 doses, now on prednisone 60mg qd. Underwent kidney bx 10/27. Rheumatology consulted, recommending initiation of treatment for presumed MPA, planning for Rituximab and cyclophosphamide to start today 10/27. Kidney function continuing to worsen, /Cr 6.1. Patient with likely uremic encephalopathy. Will plan to start HD today.       Recommendations:  - will start iHD today  - continue plex, Ritux/cytoxan per rheum  - continue PO prednisone  - strict intake and output  - renally dose medications and avoid nephrotoxins when possible

## 2022-10-27 NOTE — PLAN OF CARE
Recommendations     1. Pending NGT placement. When able, initiate TFs. Rec'd Novasource @ 35 mL/hr to provide 1680 kcals, 76 g of protein, 602 mL fluid.   2. RD to monitor & follow-up.     Goals: Meet % EEN, EPN by RD f/u date  Nutrition Goal Status: new  Communication of RD Recs: reviewed with RN

## 2022-10-27 NOTE — ASSESSMENT & PLAN NOTE
RESOLVED  Patient presents with Na 126, baseline mid/high 130s. Now recovered to baseline. Symptoms include: None. Volume status: Eu    -- Urine Na, Osm  -- Serum Osm  -- Trend Na; goal correction < 6-8 units / 24 hours  -- legionella ordered

## 2022-10-27 NOTE — ASSESSMENT & PLAN NOTE
"Patient is a 76 yo F with chronic diastolic heart failure, HTN, OA, who is admitted for respiratory failure. Rheumatology is consulted due to concern for vasculitis.    Patient presented with cough and hemoptysis since 9/24/22. She was admitted due to dyspnea and hypoxia on 10/17. She has had Hb drop to 6 this admission requiring blood transfusions. GI defers invasive workup for now because she is not stable enough. She has had increasing creatinine from baseline of 1 to 3.0 on 10/23/22. Nephrology concerned for ATN nephritic picture in urine sediment.    Reviewed her chest imaging which shows progressive disease from 10/14 until now which can be concerning for DAH. This might also explain the Hb drop. No bronch planned for now due to her tenuous respiratory status.    CBC: 17 WBCs, Hb 14 now (s/p transfusion), plt 495  CMP: creatinine 3.5, GFR 13. AST//164  UA: 1+ blood, 88 RBCs, 18 WBCs  UPCR 1.54  Complements normal  RF and CCP negative    MITUL+ 1:2560 homogenous, profile pending  PR3 slightly elevated at 1.2 (reference positive is 1.0)  MPO elevated at 132  C-ANCA+ 1:80  P-ANCA-  GBM antibodies negative  Quantiferon indeterminate. T-Spot pending.    Pending: cryoglobulins    Completed IV Solumedrol 1000 mg x3 doses  Now on IV Solumedrol 50 mg daily  Got PLEX 10/26, 10/27  Started SLED today    Recommendations:  1. 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 Rituximab 375 mg/m^2 weekly for 4 doses. Cyclophosphamide 7.5 mg/kg added on weeks 1 and 3. Start both today after SLED. Consent forms signed and scanned into chart (check Media tab and sort by "Date Uploaded").  2. Follow up kidney biopsy  3. IV Solumedrol 50 mg daily  4. Atovaquone 1500 mg and Protonix daily while on high dose steroids.      Discussed with attending physician, Dr. Palacio. Attending attestation to follow.    Layne Aguirre MD  Rheumatology Fellow  PGY-5    "

## 2022-10-27 NOTE — PROGRESS NOTES
[facility] - [facility] ICU  Nephrology  Progress Note    Patient Name: Kristin Goodman  MRN: [id number]  Admission Date: 10/17/2022  Hospital Length of Stay: 10 days  Attending Provider: Fox Holbrook MD   Primary Care Physician: Lori Hernandez MD  Principal Problem:Microscopic polyangiitis    Subjective:     HPI: Kristin Goodman is a 77 year old female with hypertension, hypothyroidism, HFpEF who presents for cough, shortness of breath, and weakness.  Patient initially presented to ER on 09/24 with hemoptysis and imaging concerning for multilobar pneumonia at which time she was discharged on a 10 day course of levofloxacin.  Patient initially had some improvement but began having worsening symptoms on 10/14.  Patient describes multiple fevers and progressive shortness of breath.  She continues to have intermittent hemoptysis.  Patient with worsening leukocytosis and limited clinical improvement despite broad-spectrum antibiotics.  She remains on cefepime.  Patient is a noted to have baseline creatinine of 1 as recent as 8/2022 but progressive worsening of creatinine in early October.  On admission patient with creatinine of 1.6 (10/17) with subsequent progression and creatinine of 3.0 at time of consultation (10/23).  Nephrology consulted for acute kidney injury.      Interval History: NAEON. Patient started on plex yesterday. Receiving plex again today and plans for Ritux/Cytoxan today. Will also plan for iHD today given worsening kidney function and concerns for uremic encephalopathy.     Review of patient's allergies indicates:   Allergen Reactions    Ampicillin     Peaches [peach (prunus persica)] Other (See Comments)     Pt unable to state type of reaction. Information obtained from daughter who states she was informed of allergy from patient.    Penicillins      Other reaction(s): Hives    Sulfa (sulfonamide antibiotics) Rash and Hives     Current Facility-Administered Medications   Medication  Frequency    0.9%  NaCl infusion (CRRT USE ONLY) Continuous    0.9%  NaCl infusion (for blood administration) Q24H PRN    0.9%  NaCl infusion (for blood administration) Q24H PRN    acetaminophen tablet 650 mg Q4H PRN    albuterol-ipratropium 2.5 mg-0.5 mg/3 mL nebulizer solution 3 mL Q4H PRN    aluminum-magnesium hydroxide-simethicone 200-200-20 mg/5 mL suspension 30 mL QID PRN    [START ON 10/28/2022] atovaquone 750 mg/5 mL oral liquid 1,500 mg Daily    azithromycin (ZITHROMAX) 250 mg in dextrose 5 % 250 mL IVPB Q24H    bisacodyL suppository 10 mg Daily PRN    cefepime in dextrose 5 % 1 gram/50 mL IVPB 1 g Q24H    cloNIDine tablet 0.2 mg Q4H PRN    dexmedetomidine (PRECEDEX) 400mcg/100mL 0.9% NaCL infusion Continuous    dextrose 10% bolus 125 mL PRN    dextrose 10% bolus 250 mL PRN    glucagon (human recombinant) injection 1 mg PRN    glucose chewable tablet 16 g PRN    glucose chewable tablet 24 g PRN    hydrALAZINE injection 10 mg Q8H PRN    insulin aspart U-100 pen 1-10 Units Q6H PRN    [START ON 10/28/2022] levothyroxine tablet 25 mcg Before breakfast    magnesium sulfate 2g in water 50mL IVPB (premix) PRN    melatonin tablet 6 mg Nightly PRN    methocarbamoL tablet 500 mg TID PRN    methylPREDNISolone sodium succinate injection 50 mg Daily    naloxone 0.4 mg/mL injection 0.02 mg PRN    ondansetron disintegrating tablet 8 mg Q8H PRN    pantoprazole injection 40 mg Daily    prochlorperazine injection Soln 5 mg Q6H PRN    simethicone chewable tablet 80 mg QID PRN    sodium chloride 0.9% flush 5 mL PRN    sodium phosphate 20.01 mmol in dextrose 5 % 250 mL IVPB PRN    sodium phosphate 30 mmol in dextrose 5 % 250 mL IVPB PRN    sodium phosphate 39.99 mmol in dextrose 5 % 250 mL IVPB PRN     Facility-Administered Medications Ordered in Other Encounters   Medication Frequency    celecoxib capsule 400 mg Once    fentaNYL 50 mcg/mL injection  mcg PRN    LIDOcaine (PF) 10 mg/ml  (1%) injection 10 mg Once PRN    LIDOcaine (PF) 10 mg/ml (1%) injection 10 mg Once    midazolam (VERSED) 1 mg/mL injection 0.5-4 mg PRN    ropivacaine 0.2% [facility] PainPRO Pump infusion 500 ML Continuous       Objective:     Vital Signs (Most Recent):  Temp: 97.6 °F (36.4 °C) (10/27/22 1130)  Pulse: 88 (10/27/22 1130)  Resp: (!) 25 (10/27/22 1130)  BP: 130/62 (10/27/22 1130)  SpO2: 96 % (10/27/22 1130)  O2 Device (Oxygen Therapy): nasal cannula w/ humidification (10/27/22 1101)   Vital Signs (24h Range):  Temp:  [96 °F (35.6 °C)-98.2 °F (36.8 °C)] 97.6 °F (36.4 °C)  Pulse:  [55-99] 88  Resp:  [9-37] 25  SpO2:  [88 %-100 %] 96 %  BP: (104-160)/(53-75) 130/62     Weight: 63.5 kg (139 lb 15.9 oz) (10/27/22 1025)  Body mass index is 26.45 kg/m².  Body surface area is 1.65 meters squared.    I/O last 3 completed shifts:  In: 3561.5 [I.V.:36.3; Blood:2843; IV Piggyback:682.2]  Out: 4096 [Urine:1269; Blood:2827]    Physical Exam  Vitals and nursing note reviewed.   Constitutional:       General: She is not in acute distress.     Appearance: She is well-developed. She is ill-appearing and toxic-appearing.      Comments: Patient somnolent, moans intermittently    HENT:      Head: Normocephalic and atraumatic.      Mouth/Throat:      Mouth: Mucous membranes are dry.   Eyes:      Conjunctiva/sclera: Conjunctivae normal.   Cardiovascular:      Rate and Rhythm: Normal rate and regular rhythm.      Heart sounds: Normal heart sounds.   Pulmonary:      Effort: Pulmonary effort is normal. No respiratory distress.      Breath sounds: Rales present. No wheezing.   Abdominal:      General: Bowel sounds are normal. There is no distension.      Palpations: Abdomen is soft.      Tenderness: There is no abdominal tenderness.   Musculoskeletal:         General: No tenderness. Normal range of motion.      Cervical back: Normal range of motion and neck supple.      Right lower leg: No edema.      Left lower leg: No edema.   Lymphadenopathy:       Cervical: No cervical adenopathy.   Skin:     General: Skin is warm and dry.      Capillary Refill: Capillary refill takes less than 2 seconds.      Findings: No rash.   Neurological:      General: No focal deficit present.      Mental Status: She is oriented to person, place, and time.   Psychiatric:      Comments: Patient somnolent, arouses to sternal rub, does not answer questions       Significant Labs:  CBC:   Recent Labs   Lab 10/27/22  0418   WBC 40.36*   RBC 2.71*   HGB 7.6*   HCT 22.3*   *   MCV 82   MCH 28.0   MCHC 34.1     CMP:   Recent Labs   Lab 10/27/22  0418   *   CALCIUM 8.4*   ALBUMIN 3.0*   PROT 6.4      K 4.1   CO2 20*      *   CREATININE 6.1*   ALKPHOS 95   ALT 48*   AST 53*   BILITOT 1.9*     All labs within the past 24 hours have been reviewed.     Significant Imaging:       Assessment/Plan:     Acute renal failure superimposed on stage 3 chronic kidney disease  Patient with baseline creatinine of 1 as recent as August 2022 with progressive worsening beginning in early October 1.3 (10/13)->1.6 (10/17)->3.0 (10/23) despite IV fluids.  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  Patient noted to have new onset proteinuria and hematuria over the last 1 month.  Additionally, would consider ATN in the setting of suspected sepsis from multifocal pneumonia.     Concerns for glomerulonephritis given patient's current clinical picture. Initial urine microscopy demonstrating muddy brown casts with likely acanthocytes noted as well. MITUL positive, proteinase 3 ab weakly positive, MPO strongly positive. Patient s/p IV solumedrol x3 doses, now on prednisone 60mg qd. Underwent kidney bx 10/27. Rheumatology consulted, recommending initiation of treatment for presumed MPA, planning for Rituximab and cyclophosphamide to start today 10/27. Kidney function continuing to worsen, /Cr 6.1. Patient with  likely uremic encephalopathy. Will plan to start HD today.       Recommendations:  - will start iHD today  - continue plex, Ritux/cytoxan per rheum  - continue PO prednisone  - strict intake and output  - renally dose medications and avoid nephrotoxins when possible        Thank you for your consult. I will follow-up with patient. Please contact us if you have any additional questions.    Bipin Frias MD  Nephrology  [facility] - Cardiac Medical ICU

## 2022-10-27 NOTE — ASSESSMENT & PLAN NOTE
As of 10/26/22 strongly positive MPO Ab (in contrast to borderline positive PR3), consistent with clinical picture of microscopic polyangiitis with pulmonary, and renal involvement. Trialysis catheter placed overnight with plans for plasmapheresis early this AM. Pt tolerated procedure well despite multiple attempts to place line. Went for IR Renal biopsy this AM. Tolerated procedure well. Endorsing burning at szymanski insertion site. Continuing Azithromycin and Cefepime. Rheumatology planning to start rituximab and cyclophosphamide.     - Underwent Plasmapheresis 10/26 w/ plans for 7 treatments over 14-day period (Schedule: Wed, Thurs, Fri, Sun, Tues, Wed, Fri)  - rituximab and cyclophosphamide, will need to hold off on plex if initiating rituximab today.  - PJP prophylaxis with atovaquone given pt's sulfa allergy.  - nephrology consulted  - rheumatology consulted  - continuing IV solumedrol given patient's intolerance to PO meds, will transition to Pred 60mg daily once able.

## 2022-10-27 NOTE — SUBJECTIVE & OBJECTIVE
Interval History: NAEON. Patient started on plex yesterday. Receiving plex again today and plans for Ritux/Cytoxan today. Will also plan for iHD today given worsening kidney function and concerns for uremic encephalopathy.     Review of patient's allergies indicates:   Allergen Reactions    Ampicillin     Peaches [peach (prunus persica)] Other (See Comments)     Pt unable to state type of reaction. Information obtained from daughter who states she was informed of allergy from patient.    Penicillins      Other reaction(s): Hives    Sulfa (sulfonamide antibiotics) Rash and Hives     Current Facility-Administered Medications   Medication Frequency    0.9%  NaCl infusion (CRRT USE ONLY) Continuous    0.9%  NaCl infusion (for blood administration) Q24H PRN    0.9%  NaCl infusion (for blood administration) Q24H PRN    acetaminophen tablet 650 mg Q4H PRN    albuterol-ipratropium 2.5 mg-0.5 mg/3 mL nebulizer solution 3 mL Q4H PRN    aluminum-magnesium hydroxide-simethicone 200-200-20 mg/5 mL suspension 30 mL QID PRN    [START ON 10/28/2022] atovaquone 750 mg/5 mL oral liquid 1,500 mg Daily    azithromycin (ZITHROMAX) 250 mg in dextrose 5 % 250 mL IVPB Q24H    bisacodyL suppository 10 mg Daily PRN    cefepime in dextrose 5 % 1 gram/50 mL IVPB 1 g Q24H    cloNIDine tablet 0.2 mg Q4H PRN    dexmedetomidine (PRECEDEX) 400mcg/100mL 0.9% NaCL infusion Continuous    dextrose 10% bolus 125 mL PRN    dextrose 10% bolus 250 mL PRN    glucagon (human recombinant) injection 1 mg PRN    glucose chewable tablet 16 g PRN    glucose chewable tablet 24 g PRN    hydrALAZINE injection 10 mg Q8H PRN    insulin aspart U-100 pen 1-10 Units Q6H PRN    [START ON 10/28/2022] levothyroxine tablet 25 mcg Before breakfast    magnesium sulfate 2g in water 50mL IVPB (premix) PRN    melatonin tablet 6 mg Nightly PRN    methocarbamoL tablet 500 mg TID PRN    methylPREDNISolone sodium succinate injection 50 mg Daily    naloxone 0.4 mg/mL injection 0.02 mg  PRN    ondansetron disintegrating tablet 8 mg Q8H PRN    pantoprazole injection 40 mg Daily    prochlorperazine injection Soln 5 mg Q6H PRN    simethicone chewable tablet 80 mg QID PRN    sodium chloride 0.9% flush 5 mL PRN    sodium phosphate 20.01 mmol in dextrose 5 % 250 mL IVPB PRN    sodium phosphate 30 mmol in dextrose 5 % 250 mL IVPB PRN    sodium phosphate 39.99 mmol in dextrose 5 % 250 mL IVPB PRN     Facility-Administered Medications Ordered in Other Encounters   Medication Frequency    celecoxib capsule 400 mg Once    fentaNYL 50 mcg/mL injection  mcg PRN    LIDOcaine (PF) 10 mg/ml (1%) injection 10 mg Once PRN    LIDOcaine (PF) 10 mg/ml (1%) injection 10 mg Once    midazolam (VERSED) 1 mg/mL injection 0.5-4 mg PRN    ropivacaine 0.2% [facility] PainPRO Pump infusion 500 ML Continuous       Objective:     Vital Signs (Most Recent):  Temp: 97.6 °F (36.4 °C) (10/27/22 1130)  Pulse: 88 (10/27/22 1130)  Resp: (!) 25 (10/27/22 1130)  BP: 130/62 (10/27/22 1130)  SpO2: 96 % (10/27/22 1130)  O2 Device (Oxygen Therapy): nasal cannula w/ humidification (10/27/22 1101)   Vital Signs (24h Range):  Temp:  [96 °F (35.6 °C)-98.2 °F (36.8 °C)] 97.6 °F (36.4 °C)  Pulse:  [55-99] 88  Resp:  [9-37] 25  SpO2:  [88 %-100 %] 96 %  BP: (104-160)/(53-75) 130/62     Weight: 63.5 kg (139 lb 15.9 oz) (10/27/22 1025)  Body mass index is 26.45 kg/m².  Body surface area is 1.65 meters squared.    I/O last 3 completed shifts:  In: 3561.5 [I.V.:36.3; Blood:2843; IV Piggyback:682.2]  Out: 4096 [Urine:1269; Blood:2827]    Physical Exam  Vitals and nursing note reviewed.   Constitutional:       General: She is not in acute distress.     Appearance: She is well-developed. She is ill-appearing and toxic-appearing.      Comments: Patient somnolent, moans intermittently    HENT:      Head: Normocephalic and atraumatic.      Mouth/Throat:      Mouth: Mucous membranes are dry.   Eyes:      Conjunctiva/sclera: Conjunctivae normal.    Cardiovascular:      Rate and Rhythm: Normal rate and regular rhythm.      Heart sounds: Normal heart sounds.   Pulmonary:      Effort: Pulmonary effort is normal. No respiratory distress.      Breath sounds: Rales present. No wheezing.   Abdominal:      General: Bowel sounds are normal. There is no distension.      Palpations: Abdomen is soft.      Tenderness: There is no abdominal tenderness.   Musculoskeletal:         General: No tenderness. Normal range of motion.      Cervical back: Normal range of motion and neck supple.      Right lower leg: No edema.      Left lower leg: No edema.   Lymphadenopathy:      Cervical: No cervical adenopathy.   Skin:     General: Skin is warm and dry.      Capillary Refill: Capillary refill takes less than 2 seconds.      Findings: No rash.   Neurological:      General: No focal deficit present.      Mental Status: She is oriented to person, place, and time.   Psychiatric:      Comments: Patient somnolent, arouses to sternal rub, does not answer questions       Significant Labs:  CBC:   Recent Labs   Lab 10/27/22  0418   WBC 40.36*   RBC 2.71*   HGB 7.6*   HCT 22.3*   *   MCV 82   MCH 28.0   MCHC 34.1     CMP:   Recent Labs   Lab 10/27/22  0418   *   CALCIUM 8.4*   ALBUMIN 3.0*   PROT 6.4      K 4.1   CO2 20*      *   CREATININE 6.1*   ALKPHOS 95   ALT 48*   AST 53*   BILITOT 1.9*     All labs within the past 24 hours have been reviewed.     Significant Imaging:

## 2022-10-27 NOTE — ASSESSMENT & PLAN NOTE
Microscopic polyangitis  Multifocal pneumonia  Hemoptysis    78 yo PMHx HTN, hypothyroid, HFpEF, OA admitted for fevers and respiratory symptoms, treated while on Hospital Medicine for multifocal PNA. Course complicated by GIB bleed (no EGD yet d/t PNA), hyponatremia, ATN. MICU consulted for rapidly increasing oxygen requirement (4L NC to BiPAP 15/10 50%), respiratory distress/work of breathing, somnolence. Nephrology consulted for ATN, concerns for possible nephritic disease as  behind ATN d/t acanthrocytes. Reports of scant hemoptysis by nursing staff 10/21, 10/22.    Imaging: CXR: Diffuse bilateral airspace infiltrates w/ c/f alveolar fillings; CT chest wc 10/17 with bl perihilar dense consolidations w/ peripheral patcy areas of GGO, BL PNA, less c/f cardiogenic pulmonary edema.  Labs: WBC uptrending 28.97, Hgb lowest 6.0 (s/p 2 u PRBC), continues to downtrend approx 1 point / day. sCr uptrending, (baseline 1.0-1.2). .      Recent Labs     10/26/22  1201   PH 7.263*   PCO2 40.9   PO2 39*   HCO3 18.5*   POCSATURATED 66*   BE -9     Etiology: Microscopic polyangitis likely. Concerns for possible PE as patient was not on thrombotic ppx s/o GIB. Incomplete I/Os charted, though reports of low UOP also renders pulmonary congestion edema as a possibility. Less c/f acute progression of multifocal PNA as adequate ABx coverage and no new fevers recently.    -- US DVT BLE unremarkable  -- Consideration for urgent BAL +/- repeat chest imaging (last done 10/17)  -- IV solumedrol   -- PJP prophylaxis  -- Rheumatology consulted, planning for rituximab  -- Per nephrology consulted, monitoring for HD needs  -- Plasmapheresis started 10/26, plans for 7 more cycles over 14 days. Will need to hold if Rheumatology planning to start rituximab today.  -- Continue cefepime and azithromycin for treatment of multifocal PNA; ID consulted  -- Diuretic trial PRN  -- Wean supplemental O2 as tolerated  -- Neuro checks  --  supplemental O2 prn

## 2022-10-27 NOTE — ASSESSMENT & PLAN NOTE
Pulmonary Hypertension    TTE (10/2022) EF 65%, G1DD  Home meds: Lisinopril, Toprol  Dry weight: N/A    -- volume control plan per Acute Hypoxemic Respiratory Failure A/P  -- Daily weights (standing if tolerated)  -- Strict I/Os; goal net negative 1.5 - 2 L / day  -- Cardiac diet w/ 2 g Na restriction

## 2022-10-27 NOTE — PROGRESS NOTES
"[facility] - Cardiac Medical ICU  Rheumatology  Progress Note    Patient Name: Kristin Goodman  MRN: [id number]  Admission Date: 10/17/2022  Hospital Length of Stay: 10 days  Code Status: Full Code   Attending Provider: Fox Holbrook MD  Primary Care Physician: Lori Hernandez MD  Principal Problem: Microscopic polyangiitis    Subjective:     HPI: Patient is a 78 yo F with chronic diastolic heart failure, HTN, OA, who is admitted for respiratory failure. Rheumatology is consulted due to concern for vasculitis. Patient initially presented to the ED on 9/24/22 complaining of a week of cough followed by an episode of hemoptysis on 9/24 prompting the ED visit. They did a CTA which showed multifocal PNA. She was sent home with Levuin. She followed up with PCP on 10/4/22 and was feeling better. Then she presented to the ED again on 10/14 and on 10/17 complaining of dyspnea. She had fevers in the few days leading up to admission of 102. She endorsed weakness, productive cough, dyspnea, and loss of appetite. Pt initally at 88% O2 saturation and improved to 98% on 2L NC. She was admitted for IV antibiotics. CT chest with contrast on 10/17 showed evidence of interval progression of bilateral perihilar dense consolidation with peripheral patchy areas of ground-glass opacification nonspecific as to the etiology.  Bilateral pneumonia as well as inflammatory etiologies considered.  Cardiogenic pulmonary edema considered less likely given the pattern.    On 10/19/22 she started having melena. Hb dropped to 6. She got 2 units pRBCs. GI was consulted. They noted "Colonoscopy in 2020 showed internal hemorrhoids and mild sigmoid diverticulosis. EGD in 2012 showed gastritis, biopsies positive for H. Pylori." "We considered doing EGD now, but from a pulmonary standpoint I do not think she is stable enough with the current pneumonia, therefore would recommend that we just follow the patient, treat with a PPI in case there is any " "peptic ulcer disease."    On 10/21/22 ENT was consulted due to left sided otalgia the day prior which resolved. She also was complaining of cervical adenopathy and sore throat. They did not recommend surgical intervention and felt that adenopathy was likely reactive.     On 10/23/22 ID was consulted. They recommended checking respiratory infection panel and fungal markers.    On 10/23/22 Nephrology was consulted due to worsening creatinine. They noted, "Patient is a noted to have baseline creatinine of 1 as recent as 8/2022 but progressive worsening of creatinine in early October.  On admission patient with creatinine of 1.6 (10/17) with subsequent progression and creatinine of 3.0 at time of consultation (10/23).  Nephrology consulted for acute kidney injury." "Patient with ATN nephritic picture in urine sediment, prot/crea ratio pending. Had hematuria in recent past but in context with positive urine cultures. Now definitely more dysmorphic red cells that wbcs in the urine. 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    On 10/23/22, she was transferred to ICU due to desaturations and respiratory distress. Pulmonologist noted that her respiratory status is too tenuous for bronchoscopy. She was stabilized with non-invasive ventilation and nasal cannula oxygen.      Interval History: Very tired, on Precedex today after period of agitation earlier.    Current Facility-Administered Medications   Medication Frequency    0.9%  NaCl infusion (CRRT USE ONLY) Continuous    0.9%  NaCl infusion (for blood administration) Q24H PRN    0.9%  NaCl infusion (for blood administration) Q24H PRN    acetaminophen tablet 650 mg Q4H PRN    [START ON 10/30/2022] albumin human 5% bottle 50 g Once    albuterol-ipratropium 2.5 mg-0.5 mg/3 mL nebulizer solution 3 mL Q4H PRN    aluminum-magnesium " hydroxide-simethicone 200-200-20 mg/5 mL suspension 30 mL QID PRN    [START ON 10/28/2022] atovaquone 750 mg/5 mL oral liquid 1,500 mg Daily    azithromycin (ZITHROMAX) 250 mg in dextrose 5 % 250 mL IVPB Q24H    bisacodyL suppository 10 mg Daily PRN    [START ON 10/29/2022] calcium gluconate 1 g in sodium chloride 0.9% 100 mL IVPB Once    [START ON 10/30/2022] calcium gluconate 1 g in sodium chloride 0.9% 100 mL IVPB Once    cefepime in dextrose 5 % 1 gram/50 mL IVPB 1 g Q24H    cloNIDine tablet 0.2 mg Q4H PRN    dexmedetomidine (PRECEDEX) 400mcg/100mL 0.9% NaCL infusion Continuous    dextrose 10% bolus 125 mL PRN    dextrose 10% bolus 250 mL PRN    [START ON 10/29/2022] diphenhydrAMINE injection 25 mg Once    [START ON 10/30/2022] diphenhydrAMINE injection 25 mg Once    glucagon (human recombinant) injection 1 mg PRN    glucose chewable tablet 16 g PRN    glucose chewable tablet 24 g PRN    [START ON 10/29/2022] heparin (porcine) injection 3,200 Units Once    [START ON 10/30/2022] heparin (porcine) injection 3,200 Units Once    hydrALAZINE injection 10 mg Q8H PRN    insulin aspart U-100 pen 1-10 Units Q6H PRN    [START ON 10/28/2022] levothyroxine tablet 25 mcg Before breakfast    magnesium sulfate 2g in water 50mL IVPB (premix) PRN    melatonin tablet 6 mg Nightly PRN    methocarbamoL tablet 500 mg TID PRN    methylPREDNISolone sodium succinate injection 50 mg Daily    naloxone 0.4 mg/mL injection 0.02 mg PRN    ondansetron disintegrating tablet 8 mg Q8H PRN    pantoprazole injection 40 mg Daily    prochlorperazine injection Soln 5 mg Q6H PRN    simethicone chewable tablet 80 mg QID PRN    sodium chloride 0.9% flush 5 mL PRN    sodium phosphate 20.01 mmol in dextrose 5 % 250 mL IVPB PRN    sodium phosphate 30 mmol in dextrose 5 % 250 mL IVPB PRN    sodium phosphate 39.99 mmol in dextrose 5 % 250 mL IVPB PRN     Facility-Administered Medications Ordered in Other Encounters    Medication Frequency    celecoxib capsule 400 mg Once    fentaNYL 50 mcg/mL injection  mcg PRN    LIDOcaine (PF) 10 mg/ml (1%) injection 10 mg Once PRN    LIDOcaine (PF) 10 mg/ml (1%) injection 10 mg Once    midazolam (VERSED) 1 mg/mL injection 0.5-4 mg PRN    ropivacaine 0.2% [facility] PainPRO Pump infusion 500 ML Continuous     Objective:     Vital Signs (Most Recent):  Temp: 96 °F (35.6 °C) (10/27/22 1501)  Pulse: 64 (10/27/22 1501)  Resp: 11 (10/27/22 1501)  BP: (!) 94/52 (10/27/22 1501)  SpO2: 97 % (10/27/22 1501)  O2 Device (Oxygen Therapy): nasal cannula w/ humidification (10/27/22 1501)   Vital Signs (24h Range):  Temp:  [96 °F (35.6 °C)-98.2 °F (36.8 °C)] 96 °F (35.6 °C)  Pulse:  [55-99] 64  Resp:  [9-40] 11  SpO2:  [88 %-100 %] 97 %  BP: ()/(52-75) 94/52     Weight: 63.5 kg (139 lb 15.9 oz) (10/27/22 1334)  Body mass index is 26.45 kg/m².  Body surface area is 1.65 meters squared.      Intake/Output Summary (Last 24 hours) at 10/27/2022 1553  Last data filed at 10/27/2022 1501  Gross per 24 hour   Intake 8793.78 ml   Output 6677 ml   Net 2116.78 ml       Physical Exam   Constitutional: She is oriented to person, place, and time. No distress.   HENT:   Head: Normocephalic and atraumatic.   Eyes: Pupils are equal, round, and reactive to light.   Cardiovascular: Normal rate and regular rhythm.   No murmur heard.  Pulmonary/Chest: Effort normal. No respiratory distress. She has no wheezes. She has rales.   Abdominal: Soft. Bowel sounds are normal. There is no abdominal tenderness.   Musculoskeletal:         General: No deformity. Normal range of motion.      Cervical back: Normal range of motion.      Right lower leg: Edema present.      Left lower leg: Edema present.   Neurological: She is alert and oriented to person, place, and time.   Skin: Skin is warm and dry.   Psychiatric: Affect and judgment normal.     Significant Labs:  CBC:   Recent Labs   Lab 10/27/22  0418   WBC 40.36*   HGB 7.6*    HCT 22.3*   *     CMP:   Recent Labs   Lab 10/27/22  0418 10/27/22  1326   * 216*   CALCIUM 8.4* 8.0*   ALBUMIN 3.0* 3.2*   PROT 6.4  --     139   K 4.1 3.7   CO2 20* 24    98   * 159*   CREATININE 6.1* 5.7*   ALKPHOS 95  --    ALT 48*  --    AST 53*  --    BILITOT 1.9*  --        Significant Imaging:  Imaging results within the past 24 hours have been reviewed.    Assessment/Plan:     Acute hypoxemic respiratory failure  Patient is a 78 yo F with chronic diastolic heart failure, HTN, OA, who is admitted for respiratory failure. Rheumatology is consulted due to concern for vasculitis.    Patient presented with cough and hemoptysis since 9/24/22. She was admitted due to dyspnea and hypoxia on 10/17. She has had Hb drop to 6 this admission requiring blood transfusions. GI defers invasive workup for now because she is not stable enough. She has had increasing creatinine from baseline of 1 to 3.0 on 10/23/22. Nephrology concerned for ATN nephritic picture in urine sediment.    Reviewed her chest imaging which shows progressive disease from 10/14 until now which can be concerning for DAH. This might also explain the Hb drop. No bronch planned for now due to her tenuous respiratory status.    CBC: 17 WBCs, Hb 14 now (s/p transfusion), plt 495  CMP: creatinine 3.5, GFR 13. AST//164  UA: 1+ blood, 88 RBCs, 18 WBCs  UPCR 1.54  Complements normal  RF and CCP negative    MITUL+ 1:2560 homogenous, profile pending  PR3 slightly elevated at 1.2 (reference positive is 1.0)  MPO elevated at 132  C-ANCA+ 1:80  P-ANCA-  GBM antibodies negative  Quantiferon indeterminate. T-Spot pending.    Pending: cryoglobulins    Completed IV Solumedrol 1000 mg x3 doses  Now on IV Solumedrol 50 mg daily  Got PLEX 10/26, 10/27  Started SLED today    Recommendations:  1. 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 Rituximab 375 mg/m^2 weekly for 4 doses. Cyclophosphamide 7.5 mg/kg added on  "weeks 1 and 3. Start both today after SLED. Consent forms signed and scanned into chart (check Media tab and sort by "Date Uploaded").  2. Follow up kidney biopsy  3. IV Solumedrol 50 mg daily  4. Atovaquone 1500 mg and Protonix daily while on high dose steroids.      Discussed with attending physician, Dr. Palacio. Attending attestation to follow.    Layne Aguirre MD  Rheumatology Fellow  PGY-5            Layne Aguirre MD  Rheumatology  [facility] - Cardiac Medical ICU  "

## 2022-10-27 NOTE — PROGRESS NOTES
Apheresis tx started at 1025 without difficulty. Pt oriented to self only.  2.5 Liter exchange.  Replacement fluids FFP, 25mg Benadryl, 2g Calcium. Tx ended at 1120.  Catheter clamped and flushed, Art line saline locked per RN request, Watson line Hep locked per protocol. Pt tolerated tx fair, very agitated and restless throughout tx, confused. Next tx on 10/28/2022, 18 hours after Ritux infusion completed. Sister is bedside.

## 2022-10-27 NOTE — SUBJECTIVE & OBJECTIVE
Interval History: Very tired, on Precedex today after period of agitation earlier.    Current Facility-Administered Medications   Medication Frequency    0.9%  NaCl infusion (CRRT USE ONLY) Continuous    0.9%  NaCl infusion (for blood administration) Q24H PRN    0.9%  NaCl infusion (for blood administration) Q24H PRN    acetaminophen tablet 650 mg Q4H PRN    [START ON 10/30/2022] albumin human 5% bottle 50 g Once    albuterol-ipratropium 2.5 mg-0.5 mg/3 mL nebulizer solution 3 mL Q4H PRN    aluminum-magnesium hydroxide-simethicone 200-200-20 mg/5 mL suspension 30 mL QID PRN    [START ON 10/28/2022] atovaquone 750 mg/5 mL oral liquid 1,500 mg Daily    azithromycin (ZITHROMAX) 250 mg in dextrose 5 % 250 mL IVPB Q24H    bisacodyL suppository 10 mg Daily PRN    [START ON 10/29/2022] calcium gluconate 1 g in sodium chloride 0.9% 100 mL IVPB Once    [START ON 10/30/2022] calcium gluconate 1 g in sodium chloride 0.9% 100 mL IVPB Once    cefepime in dextrose 5 % 1 gram/50 mL IVPB 1 g Q24H    cloNIDine tablet 0.2 mg Q4H PRN    dexmedetomidine (PRECEDEX) 400mcg/100mL 0.9% NaCL infusion Continuous    dextrose 10% bolus 125 mL PRN    dextrose 10% bolus 250 mL PRN    [START ON 10/29/2022] diphenhydrAMINE injection 25 mg Once    [START ON 10/30/2022] diphenhydrAMINE injection 25 mg Once    glucagon (human recombinant) injection 1 mg PRN    glucose chewable tablet 16 g PRN    glucose chewable tablet 24 g PRN    [START ON 10/29/2022] heparin (porcine) injection 3,200 Units Once    [START ON 10/30/2022] heparin (porcine) injection 3,200 Units Once    hydrALAZINE injection 10 mg Q8H PRN    insulin aspart U-100 pen 1-10 Units Q6H PRN    [START ON 10/28/2022] levothyroxine tablet 25 mcg Before breakfast    magnesium sulfate 2g in water 50mL IVPB (premix) PRN    melatonin tablet 6 mg Nightly PRN    methocarbamoL tablet 500 mg TID PRN    methylPREDNISolone sodium succinate injection 50 mg Daily    naloxone 0.4 mg/mL injection 0.02 mg PRN     ondansetron disintegrating tablet 8 mg Q8H PRN    pantoprazole injection 40 mg Daily    prochlorperazine injection Soln 5 mg Q6H PRN    simethicone chewable tablet 80 mg QID PRN    sodium chloride 0.9% flush 5 mL PRN    sodium phosphate 20.01 mmol in dextrose 5 % 250 mL IVPB PRN    sodium phosphate 30 mmol in dextrose 5 % 250 mL IVPB PRN    sodium phosphate 39.99 mmol in dextrose 5 % 250 mL IVPB PRN     Facility-Administered Medications Ordered in Other Encounters   Medication Frequency    celecoxib capsule 400 mg Once    fentaNYL 50 mcg/mL injection  mcg PRN    LIDOcaine (PF) 10 mg/ml (1%) injection 10 mg Once PRN    LIDOcaine (PF) 10 mg/ml (1%) injection 10 mg Once    midazolam (VERSED) 1 mg/mL injection 0.5-4 mg PRN    ropivacaine 0.2% [facility] PainPRO Pump infusion 500 ML Continuous     Objective:     Vital Signs (Most Recent):  Temp: 96 °F (35.6 °C) (10/27/22 1501)  Pulse: 64 (10/27/22 1501)  Resp: 11 (10/27/22 1501)  BP: (!) 94/52 (10/27/22 1501)  SpO2: 97 % (10/27/22 1501)  O2 Device (Oxygen Therapy): nasal cannula w/ humidification (10/27/22 1501)   Vital Signs (24h Range):  Temp:  [96 °F (35.6 °C)-98.2 °F (36.8 °C)] 96 °F (35.6 °C)  Pulse:  [55-99] 64  Resp:  [9-40] 11  SpO2:  [88 %-100 %] 97 %  BP: ()/(52-75) 94/52     Weight: 63.5 kg (139 lb 15.9 oz) (10/27/22 1334)  Body mass index is 26.45 kg/m².  Body surface area is 1.65 meters squared.      Intake/Output Summary (Last 24 hours) at 10/27/2022 1553  Last data filed at 10/27/2022 1501  Gross per 24 hour   Intake 8793.78 ml   Output 6677 ml   Net 2116.78 ml       Physical Exam   Constitutional: She is oriented to person, place, and time. No distress.   HENT:   Head: Normocephalic and atraumatic.   Eyes: Pupils are equal, round, and reactive to light.   Cardiovascular: Normal rate and regular rhythm.   No murmur heard.  Pulmonary/Chest: Effort normal. No respiratory distress. She has no wheezes. She has rales.   Abdominal: Soft. Bowel sounds  are normal. There is no abdominal tenderness.   Musculoskeletal:         General: No deformity. Normal range of motion.      Cervical back: Normal range of motion.      Right lower leg: Edema present.      Left lower leg: Edema present.   Neurological: She is alert and oriented to person, place, and time.   Skin: Skin is warm and dry.   Psychiatric: Affect and judgment normal.     Significant Labs:  CBC:   Recent Labs   Lab 10/27/22  0418   WBC 40.36*   HGB 7.6*   HCT 22.3*   *     CMP:   Recent Labs   Lab 10/27/22  0418 10/27/22  1326   * 216*   CALCIUM 8.4* 8.0*   ALBUMIN 3.0* 3.2*   PROT 6.4  --     139   K 4.1 3.7   CO2 20* 24    98   * 159*   CREATININE 6.1* 5.7*   ALKPHOS 95  --    ALT 48*  --    AST 53*  --    BILITOT 1.9*  --        Significant Imaging:  Imaging results within the past 24 hours have been reviewed.

## 2022-10-27 NOTE — PROCEDURES
[facility] - Cardiac Medical [facility]  Transfusion Medicine  Procedure Note    SUMMARY   Therapeutic Plasma Exchange (Apheresis)    Date/Time: 10/27/2022 2:06 PM  Performed by: Francisco J Jackson MD  Authorized by: Francisco J Jackson MD       Date of Procedure: 10/27/2022     Procedure: Plasma Exchange    Provider: Francisco J Jackson MD     Assisting Provider: None    Pre-Procedure Diagnosis: Microscopic polyangiitis    Post-Procedure Diagnosis: Microscopic polyangiitis    Follow-up Assessment: Ms. Goodman is a  76 yo F with chronic diastolic heart failure, HTN and recent ED visit on 9/24 for PNA with complaints of cough x 1 week and hemoptysis x 1 on 9/24 who returned on 10/14 and 0/17 with complaints of dyspnea. She was subsequently admitted after her last ED visit for respiratory failure with 88% oxygen saturation. CT chest with contrast on 10/17 showed evidence of interval progression of bilateral perihilar dense consolidation with peripheral patchy areas of ground-glass opacification nonspecific as to the etiology.  Worsening pneumonia as well as inflammatory etiologies were considered. On admission patient's creatinine at 1.6 (10/17) with worsening progression (currently @ 5.7). 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 Thus far, patient has received pulse steroids with future plans for Rituximab and Cyclophosphamide. Transfusion Medicine consulted for apheresis support.    Vasculitis, ANCA-associated with DAH carries a Category I Grade 1A indication for therapeutic apheresis via the 2016 Journal of Clinical Apheresis Guidelines (Lewis J et al. Journal of Clinical Apheresis 2016; 31:149-162.)    Interval History:  Today's procedure was well tolerated and without complications. Next treatment scheduled for Saturday, 10/29 as patient will be receiving rituximab infusion this evening.    Pertinent Laboratory Data: Complete Blood Count:    Lab Results   Component Value Date    HGB 7.6 (L) 10/27/2022    HCT 22.3 (L) 10/27/2022     (H) 10/27/2022    WBC 40.36 (H) 10/27/2022     Comprehensive Metabolic Panel:   Lab Results   Component Value Date     10/27/2022    K 4.1 10/27/2022     10/27/2022    CO2 20 (L) 10/27/2022     (H) 10/27/2022     (H) 10/27/2022    CREATININE 6.1 (H) 10/27/2022    CALCIUM 8.4 (L) 10/27/2022    PROT 6.4 10/27/2022    ALBUMIN 3.0 (L) 10/27/2022    BILITOT 1.9 (H) 10/27/2022    ALKPHOS 95 10/27/2022    AST 53 (H) 10/27/2022    ALT 48 (H) 10/27/2022    ANIONGAP 17 (H) 10/27/2022    EGFRNONAA 44 (A) 04/18/2022       Pertinent Medications:     Current Facility-Administered Medications:     0.9%  NaCl infusion (CRRT USE ONLY), , Intravenous, Continuous, Blue Puente MD    0.9%  NaCl infusion (for blood administration), , Intravenous, Q24H PRN, Madie Lancaster MD    0.9%  NaCl infusion (for blood administration), , Intravenous, Q24H PRN, Madie Lancaster MD    acetaminophen tablet 650 mg, 650 mg, Oral, Q4H PRN, Magui Cage MD, 650 mg at 10/26/22 1702    albuterol-ipratropium 2.5 mg-0.5 mg/3 mL nebulizer solution 3 mL, 3 mL, Nebulization, Q4H PRN, Kandace Smith PA-C, 3 mL at 10/23/22 2206    aluminum-magnesium hydroxide-simethicone 200-200-20 mg/5 mL suspension 30 mL, 30 mL, Oral, QID PRN, Kandace Smith PA-C    [START ON 10/28/2022] atovaquone 750 mg/5 mL oral liquid 1,500 mg, 1,500 mg, Per NG tube, Daily, Magui Cage MD    azithromycin (ZITHROMAX) 250 mg in dextrose 5 % 250 mL IVPB, 250 mg, Intravenous, Q24H, Magui Cage MD, Stopped at 10/27/22 1057    bisacodyL suppository 10 mg, 10 mg, Rectal, Daily PRN, Kandace mSith PA-C    cefepime in dextrose 5 % 1 gram/50 mL IVPB 1 g, 1 g, Intravenous, Q24H, Magui Cage MD, Stopped at 10/27/22 1245    cloNIDine tablet 0.2 mg, 0.2 mg, Oral, Q4H PRN, Madie Lancaster MD, 0.2 mg at 10/23/22 0057     dexmedetomidine (PRECEDEX) 400mcg/100mL 0.9% NaCL infusion, 0-1.4 mcg/kg/hr, Intravenous, Continuous, Magui Cage MD, Last Rate: 4.8 mL/hr at 10/27/22 1212, 0.3 mcg/kg/hr at 10/27/22 1212    dextrose 10% bolus 125 mL, 12.5 g, Intravenous, PRN, Immanuel Peres MD    dextrose 10% bolus 250 mL, 25 g, Intravenous, PRN, Immanuel Peres MD    glucagon (human recombinant) injection 1 mg, 1 mg, Intramuscular, PRN, Magui Cage MD    glucose chewable tablet 16 g, 16 g, Oral, PRN, Kandace Smtih PA-C    glucose chewable tablet 24 g, 24 g, Oral, PRN, Kandace Smith PA-C    hydrALAZINE injection 10 mg, 10 mg, Intravenous, Q8H PRN, Magui Cage MD    insulin aspart U-100 pen 1-10 Units, 1-10 Units, Subcutaneous, Q6H PRN, Magui Cage MD, 4 Units at 10/27/22 1342    [START ON 10/28/2022] levothyroxine tablet 25 mcg, 25 mcg, Per NG tube, Before breakfast, Magui Cage MD    magnesium sulfate 2g in water 50mL IVPB (premix), 2 g, Intravenous, PRN, Blue Puente MD    melatonin tablet 6 mg, 6 mg, Oral, Nightly PRN, Kandace Smith PA-C, 6 mg at 10/18/22 0235    methocarbamoL tablet 500 mg, 500 mg, Oral, TID PRN, Kandace Smith PA-C, 500 mg at 10/26/22 0628    methylPREDNISolone sodium succinate injection 50 mg, 50 mg, Intravenous, Daily, Magui Cage MD, 50 mg at 10/27/22 0943    naloxone 0.4 mg/mL injection 0.02 mg, 0.02 mg, Intravenous, PRN, Kandace Smith PA-C    ondansetron disintegrating tablet 8 mg, 8 mg, Oral, Q8H PRN, Kandace Smith PA-C, 8 mg at 10/23/22 2321    pantoprazole injection 40 mg, 40 mg, Intravenous, Daily, Magui Cage MD, 40 mg at 10/27/22 0943    prochlorperazine injection Soln 5 mg, 5 mg, Intravenous, Q6H PRN, Kandace Smith PA-C, 5 mg at 10/24/22 0001    simethicone chewable tablet 80 mg, 1 tablet, Oral, QID PRN, Kandace Smith PA-C, 80 mg at 10/22/22 1133    sodium chloride 0.9% flush 5 mL, 5 mL, Intravenous, PRN, Kandace Smith PA-C     sodium phosphate 20.01 mmol in dextrose 5 % 250 mL IVPB, 20.01 mmol, Intravenous, PRN, Blue Puente MD    sodium phosphate 30 mmol in dextrose 5 % 250 mL IVPB, 30 mmol, Intravenous, PRN, Blue Puente MD    sodium phosphate 39.99 mmol in dextrose 5 % 250 mL IVPB, 39.99 mmol, Intravenous, PRN, Blue Puente MD    Facility-Administered Medications Ordered in Other Encounters:     celecoxib capsule 400 mg, 400 mg, Oral, Once, Dieudonne Marshall MD    fentaNYL 50 mcg/mL injection  mcg,  mcg, Intravenous, PRN, Dieudonne Marshall MD, 100 mcg at 12/21/21 0919    LIDOcaine (PF) 10 mg/ml (1%) injection 10 mg, 1 mL, Intradermal, Once PRN, Dieudonne Marshall MD    LIDOcaine (PF) 10 mg/ml (1%) injection 10 mg, 1 mL, Intradermal, Once, Dieudonne Marshall MD    midazolam (VERSED) 1 mg/mL injection 0.5-4 mg, 0.5-4 mg, Intravenous, PRN, Dieudonne Marshall MD, 2 mg at 12/21/21 0919    ropivacaine 0.2% [facility] PainPRO Pump infusion 500 ML, , Perineural, Continuous, Dieudonne Marshall MD, New Bag at 12/21/21 1153       Review of patient's allergies indicates:   Allergen Reactions    Ampicillin     Peaches [peach (prunus persica)] Other (See Comments)     Pt unable to state type of reaction. Information obtained from daughter who states she was informed of allergy from patient.    Penicillins      Other reaction(s): Hives    Sulfa (sulfonamide antibiotics) Rash and Hives       Anesthesia: None     Technical Procedures Used: Plasma Exchange: Volume exchanged - 2.5 Liters; Replacement fluid - Fresh Frozen Plasma; Number of procedures 2; Date of next procedure 10/28.    Description of the Findings of the Procedure:     Please see Apheresis Nurse flowsheet for details.    The patient was evaluated and all clinical and laboratory data relevant to the treatment was reviewed, and a decision was made to proceed with the Apheresis procedure.    I was available to the clinical staff throughout the procedure.    Significant  Surgical Tasks Conducted by the Assistant(s): Not applicable    Complications: None    Estimated Blood Loss (EBL): None    Implants: None     Specimens: None    Francisco J Jackson M.D., M.S., [facility]  Medical Director  Section of Transfusion Medicine & Blood Donor Services  Department of Pathology and Laboratory Medicine  [facility]  [phone] (Blood Bank)  10/27/2022

## 2022-10-27 NOTE — SUBJECTIVE & OBJECTIVE
Interval History/Significant Events: NAEON. VSS. Tolerated plasmapheresis well. Worsening encephalopathy, precedex continued and 1 dose of dilaudid given. Pt intermittently complaining of burning around catheter. Nephro, blood bank, and rheumatology following.    Review of Systems   Unable to perform ROS: Mental status change   Genitourinary:  Positive for dysuria.   Psychiatric/Behavioral:  Positive for confusion.    Objective:     Vital Signs (Most Recent):  Temp: 96 °F (35.6 °C) (10/27/22 0701)  Pulse: (!) 55 (10/27/22 0800)  Resp: (!) 9 (10/27/22 0800)  BP: 109/61 (10/27/22 0701)  SpO2: 100 % (10/27/22 0800)   Vital Signs (24h Range):  Temp:  [96 °F (35.6 °C)-98 °F (36.7 °C)] 96 °F (35.6 °C)  Pulse:  [55-95] 55  Resp:  [9-37] 9  SpO2:  [90 %-100 %] 100 %  BP: (102-160)/(53-75) 109/61   Weight: 63.5 kg (139 lb 15.9 oz)  Body mass index is 26.45 kg/m².      Intake/Output Summary (Last 24 hours) at 10/27/2022 0807  Last data filed at 10/27/2022 0701  Gross per 24 hour   Intake 3569.57 ml   Output 3489 ml   Net 80.57 ml       Physical Exam  Vitals and nursing note reviewed.   Constitutional:       General: She is not in acute distress.     Appearance: She is well-developed. She is ill-appearing.   HENT:      Head: Normocephalic and atraumatic.   Neck:      Comments: [facility] trialysis  Cardiovascular:      Rate and Rhythm: Normal rate and regular rhythm.      Heart sounds: Normal heart sounds.   Pulmonary:      Effort: Pulmonary effort is normal. No respiratory distress.      Breath sounds: Rales present. No wheezing.   Abdominal:      General: Bowel sounds are normal. There is no distension.      Palpations: Abdomen is soft.      Tenderness: There is no abdominal tenderness.   Musculoskeletal:         General: No tenderness. Normal range of motion.      Cervical back: Normal range of motion and neck supple.      Right lower leg: Edema present.      Left lower leg: Edema present.   Lymphadenopathy:      Cervical: No  cervical adenopathy.   Skin:     General: Skin is warm and dry.      Capillary Refill: Capillary refill takes less than 2 seconds.      Findings: No rash.   Neurological:      General: No focal deficit present.      Mental Status: She is oriented to person, place, and time. She is lethargic and confused.       Vents:  Oxygen Concentration (%): 50 (10/27/22 0505)  Lines/Drains/Airways       Central Venous Catheter Line  Duration             Trialysis (Dialysis) Catheter 10/25/22 2329 left internal jugular 1 day              Drain  Duration                  Urethral Catheter 10/23/22 2230 16 Fr. 3 days              Peripheral Intravenous Line  Duration                  Midline Catheter Insertion/Assessment  - Single Lumen 10/18/22 1831 Left brachial vein 18g x 10cm 8 days                  Significant Labs:    CBC/Anemia Profile:  Recent Labs   Lab 10/25/22  1557 10/26/22  0249 10/27/22  0418   WBC 42.24* 38.52* 40.36*   HGB 7.9* 7.3* 7.6*   HCT 23.1* 21.8* 22.3*   * 562* 477*   MCV 81* 83 82   RDW 14.2 14.5 14.4        Chemistries:  Recent Labs   Lab 10/25/22  1416 10/26/22  0249 10/26/22  1233 10/27/22  0418   * 135* 136 140   K 4.1 4.2 4.3 4.1    106 105 103   CO2 15* 16* 15* 20*   * 154* 172* 164*   CREATININE 5.1* 5.7* 6.0* 6.1*   CALCIUM 8.5* 8.3* 8.2* 8.4*   ALBUMIN 1.8* 1.8*  --  3.0*   PROT 7.1 6.4  --  6.4   BILITOT 2.0* 1.7*  --  1.9*   ALKPHOS 180* 136*  --  95   * 85*  --  48*   AST 97* 57*  --  53*   MG  --  3.0* 3.2* 3.1*   PHOS  --  6.3*  --  8.0*       Blood Culture: No results for input(s): LABBLOO in the last 48 hours.  CMP:   Recent Labs   Lab 10/25/22  1416 10/26/22  0249 10/26/22  1233 10/27/22  0418   * 135* 136 140   K 4.1 4.2 4.3 4.1    106 105 103   CO2 15* 16* 15* 20*   * 171* 181* 177*   * 154* 172* 164*   CREATININE 5.1* 5.7* 6.0* 6.1*   CALCIUM 8.5* 8.3* 8.2* 8.4*   PROT 7.1 6.4  --  6.4   ALBUMIN 1.8* 1.8*  --  3.0*   BILITOT 2.0*  1.7*  --  1.9*   ALKPHOS 180* 136*  --  95   AST 97* 57*  --  53*   * 85*  --  48*   ANIONGAP 16 13 16 17*     Pathology Results  (Last 10 years)                 09/28/20 1023  Specimen to Pathology, Surgery Gastrointestinal tract Final result    Narrative:  Dx: HTN, CHF, Hypothyroid   Preo procedure Dx: Colon cancer screen   Post procedure Dx: Polyp, Diverticulosis, Hemorrhoids   Jar #1: Cecal polyp   Jar #2:Transverse polyp   Specimen total (fresh, frozen, permanent):->2             Urine Culture: No results for input(s): LABURIN in the last 48 hours.  Urine Studies: No results for input(s): COLORU, APPEARANCEUA, PHUR, SPECGRAV, PROTEINUA, GLUCUA, KETONESU, BILIRUBINUA, OCCULTUA, NITRITE, UROBILINOGEN, LEUKOCYTESUR, RBCUA, WBCUA, BACTERIA, SQUAMEPITHEL, HYALINECASTS in the last 48 hours.    Invalid input(s): WRIGHTSUR  All pertinent labs within the past 24 hours have been reviewed.    Significant Imaging:  I have reviewed all pertinent imaging results/findings within the past 24 hours.

## 2022-10-27 NOTE — ASSESSMENT & PLAN NOTE
-- Continue home medications as tolerated  -- rechecking TSH today, unclear whether patient was taking this medication at home

## 2022-10-27 NOTE — CONSULTS
[facility] - Cardiac Medical ICU  Adult Nutrition  Consult Note    SUMMARY     Recommendations    1. Pending NGT placement. When able, initiate TFs. Rec'd Novasource @ 35 mL/hr to provide 1680 kcals, 76 g of protein, 602 mL fluid.   2. RD to monitor & follow-up.    Goals: Meet % EEN, EPN by RD f/u date  Nutrition Goal Status: new  Communication of RD Recs: reviewed with RN    Assessment and Plan    Moderate malnutrition    Nutrition Problem:  Moderate Protein-Calorie Malnutrition  Malnutrition in the context of Acute Illness/Injury    Related to (etiology):  Inability to consume sufficient energy    Signs and Symptoms (as evidenced by):  Body Fat Depletion: moderate depletion of orbitals and triceps   Muscle Mass Depletion: moderate depletion of temples and clavicle region   Weight Loss: 7.5% x 3 months  Fluid Accumulation: mild    Interventions(treatment strategy):  Collaboration of nutrition care w/ other providers  EN    Nutrition Diagnosis Status:  New    Malnutrition Assessment    Weight Loss (Malnutrition): 7.5% in 3 months   Orbital Region (Subcutaneous Fat Loss): moderate depletion  Upper Arm Region (Subcutaneous Fat Loss): moderate depletion   Buddhist Region (Muscle Loss): moderate depletion  Clavicle Bone Region (Muscle Loss): moderate depletion   Edema (Fluid Accumulation): 2-->mild     Reason for Assessment    Reason For Assessment: consult, length of stay  Diagnosis: other (see comments) (Polyangiittis)  Relevant Medical History: HTN, HF  Interdisciplinary Rounds: attended    General Information Comments: NGT to be placed, per huddle - pt not tolerating PO intake. UBW: 150#, per chart review. NFPE reveals moderate fat & muscle wasting. Pt meets criteria for moderate malnutrition. Please see PES statement for details. HD being discussed.   Nutrition Discharge Planning: Pending clinical course    Nutrition/Diet History    Spiritual, Cultural Beliefs, Caodaism Practices, Values that Affect Care:  "no  Food Allergies: other (see comments) (Peaches)  Factors Affecting Nutritional Intake: difficulty/impaired swallowing    Anthropometrics    Temp: 97.6 °F (36.4 °C)  Height Method: Stated  Height: 5' 1" (154.9 cm)  Height (inches): 61 in  Weight Method: Bed Scale  Weight: 63.5 kg (139 lb 15.9 oz)  Weight (lb): 139.99 lb  Ideal Body Weight (IBW), Female: 105 lb  % Ideal Body Weight, Female (lb): 133.32 %  BMI (Calculated): 26.5  BMI Grade: 25 - 29.9 - overweight  Usual Body Weight (UBW), k.7 kg  % Usual Body Weight: 92.62  % Weight Change From Usual Weight: -7.57 %    Lab/Procedures/Meds    Pertinent Labs Reviewed: reviewed  Pertinent Labs Comments: , Creat 6.1, GFR 6.7, P 8  Pertinent Medications Reviewed: reviewed    Estimated/Assessed Needs    Weight Used For Calorie Calculations: 63.5 kg (139 lb 15.9 oz)    Energy Calorie Requirements (kcal): 3984-0039 kcal/d  Energy Need Method: Kcal/kg (25-30 kcal/kg)    Protein Requirements: 76-89 g/d (1.2-1.4 g/kg)  Weight Used For Protein Calculations: 63.5 kg (139 lb 15.9 oz)    Estimated Fluid Requirement Method: other (see comments) (Per MD or 1 mL/kcal)  RDA Method (mL): 1588    Nutrition Prescription Ordered    Current Diet Order: Renal diet ordered, however pt not tolerating.    Evaluation of Received Nutrient/Fluid Intake    I/O: -1.7L since admit    Comments: LBM: 10/26    Nutrition Risk    Level of Risk/Frequency of Follow-up:  (1x/week)     Monitor and Evaluation    Food and Nutrient Intake: energy intake, food and beverage intake, enteral nutrition intake  Food and Nutrient Adminstration: diet order, enteral and parenteral nutrition administration  Physical Activity and Function: nutrition-related ADLs and IADLs  Anthropometric Measurements: weight, weight change  Biochemical Data, Medical Tests and Procedures: inflammatory profile, lipid profile, glucose/endocrine profile, gastrointestinal profile, electrolyte and renal panel  Nutrition-Focused " Physical Findings: overall appearance     Nutrition Follow-Up    RD Follow-up?: Yes

## 2022-10-27 NOTE — ASSESSMENT & PLAN NOTE
Nutrition Problem:  Moderate Protein-Calorie Malnutrition  Malnutrition in the context of Acute Illness/Injury    Related to (etiology):  Inability to consume sufficient energy    Signs and Symptoms (as evidenced by):  Body Fat Depletion: moderate depletion of orbitals and triceps   Muscle Mass Depletion: moderate depletion of temples and clavicle region   Weight Loss: 7.5% x 3 months  Fluid Accumulation: mild    Interventions(treatment strategy):  Collaboration of nutrition care w/ other providers  EN    Nutrition Diagnosis Status:  New

## 2022-10-27 NOTE — PROCEDURES
[facility]  Transfusion Medicine  Procedure Note    SUMMARY   Therapeutic Plasma Exchange (Apheresis)    Date/Time: 10/26/2022 11:36 PM  Performed by: Francisco J Jackson MD  Authorized by: Francisco J Jackson MD       Date of Procedure: 10/26/2022     Procedure: Plasma Exchange    Provider: Francisco J Jackson MD     Assisting Provider: None    Pre-Procedure Diagnosis: Microscopic polyangiitis    Post-Procedure Diagnosis: Microscopic polyangiitis    Follow-up Assessment: Ms. Goodman is a  78 yo F with chronic diastolic heart failure, HTN and recent ED visit on 9/24 for PNA with complaints of cough x 1 week and hemoptysis x 1 on 9/24 who returned on 10/14 and 0/17 with complaints of dyspnea. She was subsequently admitted after her last ED visit for respiratory failure with 88% oxygen saturation. CT chest with contrast on 10/17 showed evidence of interval progression of bilateral perihilar dense consolidation with peripheral patchy areas of ground-glass opacification nonspecific as to the etiology.  Worsening pneumonia as well as inflammatory etiologies were considered. On admission patient's creatinine at 1.6 (10/17) with worsening progression (currently @ 5.7). 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 Thus far, patient has received pulse steroids with future plans for Rituximab and Cyclophosphamide. Transfusion Medicine consulted for apheresis support.    Vasculitis, ANCA-associated with DAH carries a Category I Grade 1A indication for therapeutic apheresis via the 2016 Journal of Clinical Apheresis Guidelines (Lewis J et al. Journal of Clinical Apheresis 2016; 31:149-162.)    Interval History:  Today's procedure was well tolerated and without complications. Next treatment scheduled for 10/27.    Pertinent Laboratory Data: Complete Blood Count:   Lab Results   Component Value Date    HGB 7.3 (L) 10/26/2022    HCT 21.8  (L) 10/26/2022     (H) 10/26/2022    WBC 38.52 (H) 10/26/2022     Comprehensive Metabolic Panel:   Lab Results   Component Value Date     10/26/2022    K 4.3 10/26/2022     10/26/2022    CO2 15 (L) 10/26/2022     (H) 10/26/2022     (H) 10/26/2022    CREATININE 6.0 (H) 10/26/2022    CALCIUM 8.2 (L) 10/26/2022    PROT 6.4 10/26/2022    ALBUMIN 1.8 (L) 10/26/2022    BILITOT 1.7 (H) 10/26/2022    ALKPHOS 136 (H) 10/26/2022    AST 57 (H) 10/26/2022    ALT 85 (H) 10/26/2022    ANIONGAP 16 10/26/2022    EGFRNONAA 44 (A) 04/18/2022       Pertinent Medications:     Current Facility-Administered Medications:     0.9%  NaCl infusion (for blood administration), , Intravenous, Q24H PRN, Madie Lancaster MD    0.9%  NaCl infusion (for blood administration), , Intravenous, Q24H PRN, Madie Lancaster MD    acetaminophen tablet 650 mg, 650 mg, Oral, Q4H PRN, Magui Cage MD, 650 mg at 10/26/22 1702    albuterol-ipratropium 2.5 mg-0.5 mg/3 mL nebulizer solution 3 mL, 3 mL, Nebulization, Q4H PRN, Kandace Smith PA-C, 3 mL at 10/23/22 2206    aluminum-magnesium hydroxide-simethicone 200-200-20 mg/5 mL suspension 30 mL, 30 mL, Oral, QID PRN, Kandace Smith PA-C    azithromycin (ZITHROMAX) 250 mg in dextrose 5 % 250 mL IVPB, 250 mg, Intravenous, Q24H, Magui Cage MD, Stopped at 10/26/22 0905    bisacodyL suppository 10 mg, 10 mg, Rectal, Daily PRN, Kandace Smith PA-C    [START ON 10/27/2022] calcium gluconate 1 g in NS IVPB (premixed), 2 g, Intravenous, Once, Francisco J Jackson MD    cefepime in dextrose 5 % 1 gram/50 mL IVPB 1 g, 1 g, Intravenous, Q24H, Magui Cage MD, Stopped at 10/26/22 1158    cloNIDine tablet 0.2 mg, 0.2 mg, Oral, Q4H PRN, Madie Lancaster MD, 0.2 mg at 10/23/22 0053    dexmedetomidine (PRECEDEX) 400mcg/100mL 0.9% NaCL infusion, 0-1.4 mcg/kg/hr, Intravenous, Continuous, Magui Cage MD, Stopped at 10/26/22 1026    dextrose 10%  bolus 125 mL, 12.5 g, Intravenous, PRN, Immanuel Peres MD    dextrose 10% bolus 250 mL, 25 g, Intravenous, PRN, Immanuel Peres MD    [START ON 10/27/2022] diphenhydrAMINE injection 50 mg, 50 mg, Intravenous, Once, Francisco J Jackson MD    glucagon (human recombinant) injection 1 mg, 1 mg, Intramuscular, PRN, Magui Cage MD    glucose chewable tablet 16 g, 16 g, Oral, PRN, Kandace Smith PA-C    glucose chewable tablet 24 g, 24 g, Oral, PRN, Kandace Smith PA-C    [START ON 10/27/2022] heparin (porcine) injection 3,200 Units, 3,200 Units, Intravenous, Once, Francisco J Jackson MD    hydrALAZINE injection 10 mg, 10 mg, Intravenous, Q8H PRN, Magui Cage MD    insulin aspart U-100 pen 1-10 Units, 1-10 Units, Subcutaneous, Q6H PRN, Magui Cage MD, 4 Units at 10/26/22 1840    melatonin tablet 6 mg, 6 mg, Oral, Nightly PRN, Kandace Smith PA-C, 6 mg at 10/18/22 0235    methocarbamoL tablet 500 mg, 500 mg, Oral, TID PRN, Kandace Smith PA-C, 500 mg at 10/26/22 0628    methylPREDNISolone sodium succinate injection 50 mg, 50 mg, Intravenous, Daily, Magui Cage MD, 50 mg at 10/26/22 1651    naloxone 0.4 mg/mL injection 0.02 mg, 0.02 mg, Intravenous, PRN, Kandace Smith PA-C    ondansetron disintegrating tablet 8 mg, 8 mg, Oral, Q8H PRN, Kandace Smith PA-C, 8 mg at 10/23/22 2321    pantoprazole injection 40 mg, 40 mg, Intravenous, Daily, Magui Cage MD, 40 mg at 10/26/22 0824    prochlorperazine injection Soln 5 mg, 5 mg, Intravenous, Q6H PRN, Kandace Smith PA-C, 5 mg at 10/24/22 0001    simethicone chewable tablet 80 mg, 1 tablet, Oral, QID PRN, Kandace Smith PA-C, 80 mg at 10/22/22 1133    sodium chloride 0.9% flush 5 mL, 5 mL, Intravenous, PRN, Kandace Smith PA-C    Facility-Administered Medications Ordered in Other Encounters:     celecoxib capsule 400 mg, 400 mg, Oral, Once, Dieudonne Marshall MD    fentaNYL 50 mcg/mL injection  mcg,  mcg,  Intravenous, PRN, Dieudonne Marshall MD, 100 mcg at 12/21/21 0919    LIDOcaine (PF) 10 mg/ml (1%) injection 10 mg, 1 mL, Intradermal, Once PRN, Dieudonne Marshall MD    LIDOcaine (PF) 10 mg/ml (1%) injection 10 mg, 1 mL, Intradermal, Once, Dieudonne Marshall MD    midazolam (VERSED) 1 mg/mL injection 0.5-4 mg, 0.5-4 mg, Intravenous, PRN, Dieudonne Marshall MD, 2 mg at 12/21/21 0919    ropivacaine 0.2% [facility] Pump infusion 500 ML, , Perineural, Continuous, Dieudonne Marshall MD, New Bag at 12/21/21 1153       Review of patient's allergies indicates:   Allergen Reactions    Ampicillin     Peaches [peach (prunus persica)] Other (See Comments)     Pt unable to state type of reaction. Information obtained from daughter who states she was informed of allergy from patient.    Penicillins      Other reaction(s): Hives    Sulfa (sulfonamide antibiotics) Rash and Hives       Anesthesia: None     Technical Procedures Used: Plasma Exchange: Volume exchanged - 2.5 Liters; Replacement fluid - Fresh Frozen Plasma; Number of procedures 1; Date of next procedure 10/27.    Description of the Findings of the Procedure:     Please see Apheresis Nurse flowsheet for details.    The patient was evaluated and all clinical and laboratory data relevant to the treatment was reviewed, and a decision was made to proceed with the Apheresis procedure.    I was available to the clinical staff throughout the procedure.    Significant Surgical Tasks Conducted by the Assistant(s): Not applicable    Complications: None    Estimated Blood Loss (EBL): None    Implants: None     Specimens: None    Francisco J Jackson M.D., M.S., [facility]  Medical Director  Section of Transfusion Medicine & Blood Donor Services  Department of Pathology and Laboratory Medicine  [facility]  [phone] (Blood Bank)  10/26/2022

## 2022-10-27 NOTE — PLAN OF CARE
Problem: Adult Inpatient Plan of Care  Goal: Plan of Care Review  Outcome: Ongoing, Not Progressing  Flowsheets (Taken 10/27/2022 0518)  Plan of Care Reviewed With: patient  Goal: Readiness for Transition of Care  Outcome: Ongoing, Not Progressing     Problem: Renal Function Impairment (Acute Kidney Injury/Impairment)  Goal: Effective Renal Function  Outcome: Ongoing, Not Progressing  Intervention: Monitor and Support Renal Function  Flowsheets (Taken 10/27/2022 0518)  Stabilization Measures: respiratory support increased  Medication Review/Management:   medications reviewed   dosing adjusted   high-risk medications identified   provider consulted   infusion initiated     Problem: Infection (Pneumonia)  Goal: Resolution of Infection Signs and Symptoms  Outcome: Ongoing, Not Progressing  Intervention: Prevent Infection Progression  Flowsheets (Taken 10/27/2022 0518)  Fever Reduction/Comfort Measures: lightweight clothing  Infection Management: aseptic technique maintained  Isolation Precautions: precautions maintained     Problem: Respiratory Compromise (Pneumonia)  Goal: Effective Oxygenation and Ventilation  Outcome: Ongoing, Not Progressing  Intervention: Optimize Oxygenation and Ventilation  Flowsheets (Taken 10/27/2022 0518)  Airway/Ventilation Management: airway patency maintained  Head of Bed (HOB) Positioning: HOB at 30-45 degrees     Problem: Adult Inpatient Plan of Care  Goal: Absence of Hospital-Acquired Illness or Injury  Intervention: Identify and Manage Fall Risk  Flowsheets (Taken 10/27/2022 0518)  Safety Promotion/Fall Prevention:   bed alarm set   side rails raised x 3  Intervention: Prevent Skin Injury  Flowsheets (Taken 10/27/2022 0518)  Skin Protection:   silicone foam dressing in place   tubing/devices free from skin contact   incontinence pads utilized  Intervention: Prevent and Manage VTE (Venous Thromboembolism) Risk  Flowsheets (Taken 10/27/2022 0518)  VTE Prevention/Management: remove,  assess skin, and reapply sequential compression device  Range of Motion: ROM (range of motion) performed  Intervention: Prevent Infection  Flowsheets (Taken 10/27/2022 8271)  Infection Prevention:   environmental surveillance performed   hand hygiene promoted

## 2022-10-28 LAB
ABO + RH BLD: NORMAL
ALBUMIN SERPL BCP-MCNC: 2.7 G/DL (ref 3.5–5.2)
ALBUMIN SERPL BCP-MCNC: 2.8 G/DL (ref 3.5–5.2)
ALP SERPL-CCNC: 76 U/L (ref 55–135)
ALT SERPL W/O P-5'-P-CCNC: 42 U/L (ref 10–44)
ANION GAP SERPL CALC-SCNC: 12 MMOL/L (ref 8–16)
ANION GAP SERPL CALC-SCNC: 13 MMOL/L (ref 8–16)
ANISOCYTOSIS BLD QL SMEAR: ABNORMAL
ANISOCYTOSIS BLD QL SMEAR: SLIGHT
AST SERPL-CCNC: 67 U/L (ref 10–40)
BASO STIPL BLD QL SMEAR: ABNORMAL
BASOPHILS # BLD AUTO: ABNORMAL K/UL (ref 0–0.2)
BASOPHILS NFR BLD: 0 % (ref 0–1.9)
BASOPHILS NFR BLD: 0 % (ref 0–1.9)
BILIRUB SERPL-MCNC: 1.1 MG/DL (ref 0.1–1)
BLD GP AB SCN CELLS X3 SERPL QL: NORMAL
BLD PROD TYP BPU: NORMAL
BLOOD UNIT EXPIRATION DATE: NORMAL
BLOOD UNIT TYPE CODE: 7300
BLOOD UNIT TYPE: NORMAL
BUN SERPL-MCNC: 108 MG/DL (ref 8–23)
BUN SERPL-MCNC: 116 MG/DL (ref 8–23)
CALCIUM SERPL-MCNC: 7.4 MG/DL (ref 8.7–10.5)
CALCIUM SERPL-MCNC: 7.6 MG/DL (ref 8.7–10.5)
CHLORIDE SERPL-SCNC: 108 MMOL/L (ref 95–110)
CHLORIDE SERPL-SCNC: 108 MMOL/L (ref 95–110)
CO2 SERPL-SCNC: 22 MMOL/L (ref 23–29)
CO2 SERPL-SCNC: 23 MMOL/L (ref 23–29)
CODING SYSTEM: NORMAL
CREAT SERPL-MCNC: 4.2 MG/DL (ref 0.5–1.4)
CREAT SERPL-MCNC: 4.6 MG/DL (ref 0.5–1.4)
DIFFERENTIAL METHOD: ABNORMAL
DIFFERENTIAL METHOD: ABNORMAL
DISPENSE STATUS: NORMAL
EOSINOPHIL # BLD AUTO: ABNORMAL K/UL (ref 0–0.5)
EOSINOPHIL NFR BLD: 0 % (ref 0–8)
EOSINOPHIL NFR BLD: 0 % (ref 0–8)
ERYTHROCYTE [DISTWIDTH] IN BLOOD BY AUTOMATED COUNT: 14 % (ref 11.5–14.5)
ERYTHROCYTE [DISTWIDTH] IN BLOOD BY AUTOMATED COUNT: 14.3 % (ref 11.5–14.5)
EST. GFR  (NO RACE VARIABLE): 10.5 ML/MIN/1.73 M^2
EST. GFR  (NO RACE VARIABLE): 9.4 ML/MIN/1.73 M^2
GIANT PLATELETS BLD QL SMEAR: PRESENT
GLUCOSE SERPL-MCNC: 171 MG/DL (ref 70–110)
GLUCOSE SERPL-MCNC: 172 MG/DL (ref 70–110)
HCT VFR BLD AUTO: 19.2 % (ref 37–48.5)
HCT VFR BLD AUTO: 28 % (ref 37–48.5)
HGB BLD-MCNC: 6.4 G/DL (ref 12–16)
HGB BLD-MCNC: 9.4 G/DL (ref 12–16)
HYPOCHROMIA BLD QL SMEAR: ABNORMAL
HYPOCHROMIA BLD QL SMEAR: ABNORMAL
IMM GRANULOCYTES # BLD AUTO: ABNORMAL K/UL (ref 0–0.04)
IMM GRANULOCYTES # BLD AUTO: ABNORMAL K/UL (ref 0–0.04)
IMM GRANULOCYTES NFR BLD AUTO: ABNORMAL % (ref 0–0.5)
IMM GRANULOCYTES NFR BLD AUTO: ABNORMAL % (ref 0–0.5)
LYMPHOCYTES # BLD AUTO: ABNORMAL K/UL (ref 1–4.8)
LYMPHOCYTES NFR BLD: 3 % (ref 18–48)
LYMPHOCYTES NFR BLD: 6 % (ref 18–48)
MAGNESIUM SERPL-MCNC: 2.3 MG/DL (ref 1.6–2.6)
MAGNESIUM SERPL-MCNC: 2.4 MG/DL (ref 1.6–2.6)
MCH RBC QN AUTO: 28.1 PG (ref 27–31)
MCH RBC QN AUTO: 29 PG (ref 27–31)
MCHC RBC AUTO-ENTMCNC: 33.3 G/DL (ref 32–36)
MCHC RBC AUTO-ENTMCNC: 33.6 G/DL (ref 32–36)
MCV RBC AUTO: 84 FL (ref 82–98)
MCV RBC AUTO: 86 FL (ref 82–98)
METAMYELOCYTES NFR BLD MANUAL: 1 %
METAMYELOCYTES NFR BLD MANUAL: 3 %
MONOCYTES # BLD AUTO: ABNORMAL K/UL (ref 0.3–1)
MONOCYTES NFR BLD: 6 % (ref 4–15)
MONOCYTES NFR BLD: 6 % (ref 4–15)
MYELOCYTES NFR BLD MANUAL: 5 %
NEUTROPHILS NFR BLD: 77 % (ref 38–73)
NEUTROPHILS NFR BLD: 85 % (ref 38–73)
NEUTS BAND NFR BLD MANUAL: 2 %
NEUTS BAND NFR BLD MANUAL: 5 %
NRBC BLD-RTO: 2 /100 WBC
NRBC BLD-RTO: 2 /100 WBC
NUM UNITS TRANS PACKED RBC: NORMAL
OVALOCYTES BLD QL SMEAR: ABNORMAL
OVALOCYTES BLD QL SMEAR: ABNORMAL
PHOSPHATE SERPL-MCNC: 5.8 MG/DL (ref 2.7–4.5)
PHOSPHATE SERPL-MCNC: 5.9 MG/DL (ref 2.7–4.5)
PLATELET # BLD AUTO: 253 K/UL (ref 150–450)
PLATELET # BLD AUTO: 255 K/UL (ref 150–450)
PLATELET BLD QL SMEAR: ABNORMAL
PMV BLD AUTO: 11 FL (ref 9.2–12.9)
PMV BLD AUTO: 11.3 FL (ref 9.2–12.9)
POCT GLUCOSE: 150 MG/DL (ref 70–110)
POCT GLUCOSE: 172 MG/DL (ref 70–110)
POIKILOCYTOSIS BLD QL SMEAR: SLIGHT
POIKILOCYTOSIS BLD QL SMEAR: SLIGHT
POLYCHROMASIA BLD QL SMEAR: ABNORMAL
POLYCHROMASIA BLD QL SMEAR: ABNORMAL
POTASSIUM SERPL-SCNC: 3.9 MMOL/L (ref 3.5–5.1)
POTASSIUM SERPL-SCNC: 4.1 MMOL/L (ref 3.5–5.1)
PROMYELOCYTES NFR BLD MANUAL: 1 %
PROT SERPL-MCNC: 5.1 G/DL (ref 6–8.4)
RBC # BLD AUTO: 2.28 M/UL (ref 4–5.4)
RBC # BLD AUTO: 3.24 M/UL (ref 4–5.4)
SCHISTOCYTES BLD QL SMEAR: ABNORMAL
SMUDGE CELLS BLD QL SMEAR: PRESENT
SODIUM SERPL-SCNC: 142 MMOL/L (ref 136–145)
SODIUM SERPL-SCNC: 144 MMOL/L (ref 136–145)
SPHEROCYTES BLD QL SMEAR: ABNORMAL
TARGETS BLD QL SMEAR: ABNORMAL
TARGETS BLD QL SMEAR: ABNORMAL
TOXIC GRANULES BLD QL SMEAR: PRESENT
WBC # BLD AUTO: 36.75 K/UL (ref 3.9–12.7)
WBC # BLD AUTO: 38.44 K/UL (ref 3.9–12.7)

## 2022-10-28 PROCEDURE — 83735 ASSAY OF MAGNESIUM: CPT | Mod: 91 | Performed by: STUDENT IN AN ORGANIZED HEALTH CARE EDUCATION/TRAINING PROGRAM

## 2022-10-28 PROCEDURE — 94660 CPAP INITIATION&MGMT: CPT

## 2022-10-28 PROCEDURE — C9113 INJ PANTOPRAZOLE SODIUM, VIA: HCPCS | Performed by: STUDENT IN AN ORGANIZED HEALTH CARE EDUCATION/TRAINING PROGRAM

## 2022-10-28 PROCEDURE — 99231 SBSQ HOSP IP/OBS SF/LOW 25: CPT | Mod: ,,, | Performed by: INTERNAL MEDICINE

## 2022-10-28 PROCEDURE — 99291 CRITICAL CARE FIRST HOUR: CPT | Mod: GC,,, | Performed by: EMERGENCY MEDICINE

## 2022-10-28 PROCEDURE — 99291 PR CRITICAL CARE, E/M 30-74 MINUTES: ICD-10-PCS | Mod: GC,,, | Performed by: EMERGENCY MEDICINE

## 2022-10-28 PROCEDURE — 80053 COMPREHEN METABOLIC PANEL: CPT

## 2022-10-28 PROCEDURE — 25000003 PHARM REV CODE 250: Performed by: INTERNAL MEDICINE

## 2022-10-28 PROCEDURE — 20000000 HC ICU ROOM

## 2022-10-28 PROCEDURE — P9016 RBC LEUKOCYTES REDUCED: HCPCS | Performed by: STUDENT IN AN ORGANIZED HEALTH CARE EDUCATION/TRAINING PROGRAM

## 2022-10-28 PROCEDURE — 99900035 HC TECH TIME PER 15 MIN (STAT)

## 2022-10-28 PROCEDURE — 27201109 HC SYSTEM FECAL MANAGEMENT

## 2022-10-28 PROCEDURE — 85027 COMPLETE CBC AUTOMATED: CPT

## 2022-10-28 PROCEDURE — 83735 ASSAY OF MAGNESIUM: CPT

## 2022-10-28 PROCEDURE — 25000003 PHARM REV CODE 250

## 2022-10-28 PROCEDURE — 99231 PR SUBSEQUENT HOSPITAL CARE,LEVL I: ICD-10-PCS | Mod: ,,, | Performed by: INTERNAL MEDICINE

## 2022-10-28 PROCEDURE — 80069 RENAL FUNCTION PANEL: CPT | Performed by: STUDENT IN AN ORGANIZED HEALTH CARE EDUCATION/TRAINING PROGRAM

## 2022-10-28 PROCEDURE — 84100 ASSAY OF PHOSPHORUS: CPT

## 2022-10-28 PROCEDURE — 63600175 PHARM REV CODE 636 W HCPCS: Performed by: STUDENT IN AN ORGANIZED HEALTH CARE EDUCATION/TRAINING PROGRAM

## 2022-10-28 PROCEDURE — 85007 BL SMEAR W/DIFF WBC COUNT: CPT | Mod: 91 | Performed by: EMERGENCY MEDICINE

## 2022-10-28 PROCEDURE — 94761 N-INVAS EAR/PLS OXIMETRY MLT: CPT

## 2022-10-28 PROCEDURE — 63600175 PHARM REV CODE 636 W HCPCS: Performed by: INTERNAL MEDICINE

## 2022-10-28 PROCEDURE — 85007 BL SMEAR W/DIFF WBC COUNT: CPT

## 2022-10-28 PROCEDURE — 27000221 HC OXYGEN, UP TO 24 HOURS

## 2022-10-28 PROCEDURE — 25000003 PHARM REV CODE 250: Performed by: STUDENT IN AN ORGANIZED HEALTH CARE EDUCATION/TRAINING PROGRAM

## 2022-10-28 PROCEDURE — 85027 COMPLETE CBC AUTOMATED: CPT | Mod: 91 | Performed by: EMERGENCY MEDICINE

## 2022-10-28 PROCEDURE — 86901 BLOOD TYPING SEROLOGIC RH(D): CPT | Performed by: STUDENT IN AN ORGANIZED HEALTH CARE EDUCATION/TRAINING PROGRAM

## 2022-10-28 RX ORDER — QUETIAPINE FUMARATE 25 MG/1
25 TABLET, FILM COATED ORAL NIGHTLY
Status: DISCONTINUED | OUTPATIENT
Start: 2022-10-28 | End: 2022-12-04

## 2022-10-28 RX ADMIN — QUETIAPINE FUMARATE 25 MG: 25 TABLET ORAL at 08:10

## 2022-10-28 RX ADMIN — METHYLPREDNISOLONE SODIUM SUCCINATE 50 MG: 40 INJECTION, POWDER, FOR SOLUTION INTRAMUSCULAR; INTRAVENOUS at 09:10

## 2022-10-28 RX ADMIN — AZITHROMYCIN 250 MG: 500 INJECTION, POWDER, LYOPHILIZED, FOR SOLUTION INTRAVENOUS at 11:10

## 2022-10-28 RX ADMIN — DEXMEDETOMIDINE HYDROCHLORIDE 0.5 MCG/KG/HR: 4 INJECTION INTRAVENOUS at 04:10

## 2022-10-28 RX ADMIN — LEVOTHYROXINE SODIUM 25 MCG: 25 TABLET ORAL at 05:10

## 2022-10-28 RX ADMIN — HYDRALAZINE HYDROCHLORIDE 10 MG: 20 INJECTION, SOLUTION INTRAMUSCULAR; INTRAVENOUS at 05:10

## 2022-10-28 RX ADMIN — PANTOPRAZOLE SODIUM 40 MG: 40 INJECTION, POWDER, FOR SOLUTION INTRAVENOUS at 09:10

## 2022-10-28 RX ADMIN — ATOVAQUONE 1500 MG: 750 SUSPENSION ORAL at 09:10

## 2022-10-28 RX ADMIN — INSULIN ASPART 2 UNITS: 100 INJECTION, SOLUTION INTRAVENOUS; SUBCUTANEOUS at 05:10

## 2022-10-28 RX ADMIN — MESNA 96 MG: 100 INJECTION, SOLUTION INTRAVENOUS at 03:10

## 2022-10-28 RX ADMIN — INSULIN ASPART 1 UNITS: 100 INJECTION, SOLUTION INTRAVENOUS; SUBCUTANEOUS at 12:10

## 2022-10-28 NOTE — ASSESSMENT & PLAN NOTE
See microscopic polyangitis    Patient w/o CKD presenting with WILFREDO with rapidly increasing sCr (baseline sCr 1.0-1.2). New onset proteinuria/hematuria in last 30 days. .  DDx: ATN vs GN.     Serum creatinine: 4.2 mg/dL (H) 10/28/22 0304  Estimated creatinine clearance: 9.9 mL/min (A)    -- F/U serologies per Nephro (MITUL, ANCA, anti-GBM, IgA, C3, C4, HCV, HBV, cryo, RF)  -- Renal Bx done today  -- IV Solumedrol 1mg x3 days completed. Now continuing lower dose IV solumedrol in setting of pt's intolerance for PO meds. Will transition to PO pred once able.  -- Trend sCr  -- Strict I&Os  -- Avoid nephrotoxic agents  -- Renally adjust medications  -- added bicarb today  -- Nephro and Rheumatology following  -- HD done yesterday afternoon

## 2022-10-28 NOTE — PROGRESS NOTES
[facility] - Cardiac Medical ICU  Critical Care Medicine  Progress Note    Patient Name: Kristin Goodman  MRN: [id number]  Admission Date: 10/17/2022  Hospital Length of Stay: 11 days  Code Status: Full Code  Attending Provider: Fox Holbrook MD  Primary Care Provider: Lori Hernandez MD   Principal Problem: Microscopic polyangiitis    Subjective:     HPI:  Ms. Goodman is a 77 year old female with PMH notable for HTN, hypothyroidism, HFpEF (65%, TR, elevated PA pressure), and osteoarthritis of the arm who initially presented to [facility] [place] 10/17 with progressive shortness of breath, weakness, cough, and hemoptysis. Previously, she presented the ED 9/24 with hemoptysis and CT and CXR at that time concerning for PNA which she was given a 10 day course of abx and albuterol. She followed up with her PCP and had improvement of symptoms. She then presented to the ED 10/14 with no interventions. At the time of this presentation, 10/17, in addition to above symptoms, she reports fever up to 102.4 and increasing amounts of hemoptysis. CT chest with extensive opacification at admission. She was started on broad spectrum IV abx coverage with Vanc / Zosyn and an infectious work up was sent. ID was later consulted during the hospitalization.     Her hospitalization has been complicated by GIB which required 2 u PRBC transfusion and PPI therapy. No EGD with GI. Additionally had bilateral cervical adenopathy which was evaluated by ENT and felt to be reactive in nature. She has had worsening renal function and nephrology was consulted with plans for a kidney biopsy. She has been trailed with IV diuresis. Nephrology concerned GN. Rheumatologic work up in process.     Critical care consulted on the evening of 10/23 for worsening respiratory failure now requiring NIPPV. Earlier today on 4L nasal cannula then transitioned to venti mask and later BiPap. Worsening opacification on CXR and AMS. Patient upgraded to MICU for higher level of  care and closer monitoring in setting of her tenuous respiratory status.       Hospital/ICU Course:  Pt transferred to ICU 10/23/22 2/2 respiratory distress in the setting of hemoptysis. Decision made to consult IR and rheumatology. High-dose solumedrol x 3 days followed by predinitiated. Complete rheumatologic work-up initiated. IR consulted for renal biopsy, planned for 10/25 but postponed. Worsening mental and respiratory status 10/25. Trialysis catheter placed overnight 10/25 with plans for plasmapheresis overnight. Pt tolerated procedure well despite multiple attempts to place line. Went for IR Renal biopsy AM of 10/26. Tolerated procedure well. Endorsing burning at szymanski insertion site. Continuing Azithromycin and Cefepime. Unable to start PJP prophylaxis due to sulfa allergy and inability to tolerate PO meds at this point due to worsening encephalopathy and mild sedation. Rheumatology planning to start rituximab and cyclophosphamide. 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 NG tube placed 10/27. Pt now receiving tube feeds and atovaquone via NG tube. Now getting plasma exchange, dialysis, rituximab, and cyclophosphamide.      Interval History/Significant Events: ON, pt vomited x1, NG tube was slightly misplaced. NG tube repositioned. She has received HD, 2nd plasma exchange, and first dose of rituximab and cyclophosphamide yesterday afternoon.     Review of Systems   Unable to perform ROS: Mental status change   Genitourinary:  Positive for dysuria.   Psychiatric/Behavioral:  Positive for confusion.    Objective:     Vital Signs (Most Recent):  Temp: 97.8 °F (36.6 °C) (10/28/22 0456)  Pulse: 71 (10/28/22 0730)  Resp: 15 (10/28/22 0730)  BP: (!) 152/65 (10/28/22 0600)  SpO2: 100 % (10/28/22 0730)   Vital Signs (24h Range):  Temp:  [96 °F (35.6 °C)-98.5 °F (36.9 °C)] 97.8 °F (36.6  °C)  Pulse:  [] 71  Resp:  [11-40] 15  SpO2:  [88 %-100 %] 100 %  BP: ()/(52-75) 152/65   Weight: 63.5 kg (139 lb 15.9 oz)  Body mass index is 26.45 kg/m².      Intake/Output Summary (Last 24 hours) at 10/28/2022 0848  Last data filed at 10/28/2022 0600  Gross per 24 hour   Intake 8340.7 ml   Output 5058 ml   Net 3282.7 ml       Physical Exam  Vitals and nursing note reviewed.   Constitutional:       General: She is not in acute distress.     Appearance: She is well-developed. She is ill-appearing and toxic-appearing.      Comments: Patient somnolent, moans intermittently    HENT:      Head: Normocephalic and atraumatic.      Mouth/Throat:      Mouth: Mucous membranes are dry.   Eyes:      Conjunctiva/sclera: Conjunctivae normal.   Cardiovascular:      Rate and Rhythm: Normal rate and regular rhythm.      Heart sounds: Normal heart sounds.   Pulmonary:      Effort: Pulmonary effort is normal. No respiratory distress.      Breath sounds: Rales present. No wheezing.   Abdominal:      General: Bowel sounds are normal. There is no distension.      Palpations: Abdomen is soft.      Tenderness: There is no abdominal tenderness.   Musculoskeletal:         General: No tenderness. Normal range of motion.      Cervical back: Normal range of motion and neck supple.      Right lower leg: No edema.      Left lower leg: No edema.   Lymphadenopathy:      Cervical: No cervical adenopathy.   Skin:     General: Skin is warm and dry.      Capillary Refill: Capillary refill takes less than 2 seconds.      Findings: No rash.   Neurological:      General: No focal deficit present.      Mental Status: She is oriented to person, place, and time.   Psychiatric:      Comments: Patient somnolent, arouses to sternal rub, does not answer questions       Vents:  Oxygen Concentration (%): 50 (10/27/22 0505)  Lines/Drains/Airways       Central Venous Catheter Line  Duration             Trialysis (Dialysis) Catheter 10/25/22 2329 left  internal jugular 2 days              Drain  Duration                  Urethral Catheter 10/23/22 2230 16 Fr. 4 days         NG/OG Tube 10/27/22 1300 nasogastric 14 Fr. Right nostril <1 day              Peripheral Intravenous Line  Duration                  Midline Catheter Insertion/Assessment  - Single Lumen 10/18/22 1831 Left brachial vein 18g x 10cm 9 days                  Significant Labs:    CBC/Anemia Profile:  Recent Labs   Lab 10/27/22  0418 10/28/22  0304   WBC 40.36* 38.44*   HGB 7.6* 6.4*   HCT 22.3* 19.2*   * 255   MCV 82 84   RDW 14.4 14.3        Chemistries:  Recent Labs   Lab 10/27/22  0418 10/27/22  1326 10/28/22  0304    139 142   K 4.1 3.7 4.1    98 108   CO2 20* 24 22*   * 159* 108*   CREATININE 6.1* 5.7* 4.2*   CALCIUM 8.4* 8.0* 7.4*   ALBUMIN 3.0* 3.2* 2.7*   PROT 6.4  --  5.1*   BILITOT 1.9*  --  1.1*   ALKPHOS 95  --  76   ALT 48*  --  42   AST 53*  --  67*   MG 3.1* 2.9* 2.3   PHOS 8.0* 8.6* 5.8*       CMP:   Recent Labs   Lab 10/27/22  0418 10/27/22  1326 10/28/22  0304    139 142   K 4.1 3.7 4.1    98 108   CO2 20* 24 22*   * 216* 171*   * 159* 108*   CREATININE 6.1* 5.7* 4.2*   CALCIUM 8.4* 8.0* 7.4*   PROT 6.4  --  5.1*   ALBUMIN 3.0* 3.2* 2.7*   BILITOT 1.9*  --  1.1*   ALKPHOS 95  --  76   AST 53*  --  67*   ALT 48*  --  42   ANIONGAP 17* 17* 12     All pertinent labs within the past 24 hours have been reviewed.    Significant Imaging:  I have reviewed all pertinent imaging results/findings within the past 24 hours.      ABG  Recent Labs   Lab 10/26/22  1201   PH 7.263*   PO2 39*   PCO2 40.9   HCO3 18.5*   BE -9     Assessment/Plan:     Pulmonary  Acute hypoxemic respiratory failure  Microscopic polyangitis  Multifocal pneumonia  Hemoptysis    78 yo PMHx HTN, hypothyroid, HFpEF, OA admitted for fevers and respiratory symptoms, treated while on Hospital Medicine for multifocal PNA. Course complicated by GIB bleed (no EGD yet d/t PNA),  hyponatremia, ATN. MICU consulted for rapidly increasing oxygen requirement (4L NC to BiPAP 15/10 50%), respiratory distress/work of breathing, somnolence. Nephrology consulted for ATN, concerns for possible nephritic disease as  behind ATN d/t acanthrocytes. Reports of scant hemoptysis by nursing staff 10/21, 10/22.    Imaging: CXR: Diffuse bilateral airspace infiltrates w/ c/f alveolar fillings; CT chest wc 10/17 with bl perihilar dense consolidations w/ peripheral patcy areas of GGO, BL PNA, less c/f cardiogenic pulmonary edema.  Labs: WBC uptrending 28.97, Hgb lowest 6.0 (s/p 2 u PRBC), continues to downtrend approx 1 point / day. sCr uptrending, (baseline 1.0-1.2). .      Recent Labs     10/26/22  1201   PH 7.263*   PCO2 40.9   PO2 39*   HCO3 18.5*   POCSATURATED 66*   BE -9     Etiology: Microscopic polyangitis likely. Concerns for possible PE as patient was not on thrombotic ppx s/o GIB. Incomplete I/Os charted, though reports of low UOP also renders pulmonary congestion edema as a possibility. Less c/f acute progression of multifocal PNA as adequate ABx coverage and no new fevers recently.    -- US DVT BLE unremarkable  -- Consideration for urgent BAL +/- repeat chest imaging (last done 10/17)  -- IV solumedrol   -- PJP prophylaxis  -- Rheumatology consulted, planning for rituximab  -- Per nephrology consulted, monitoring for HD needs  -- Plasmapheresis started 10/26, plans for 7 more cycles over 14 days.  -- completed cefepime and azithromycin for treatment of multifocal PNA; ID consulted  -- Diuretic trial PRN  -- Wean supplemental O2 as tolerated  -- Neuro checks  -- supplemental O2 prn    Multifocal pneumonia  See acute hypoxemic respiratory failure    Cardiac/Vascular  Chronic diastolic heart failure  Pulmonary Hypertension    TTE (10/2022) EF 65%, G1DD  Home meds: Lisinopril, Toprol  Dry weight: N/A    -- volume control plan per Acute Hypoxemic Respiratory Failure A/P  -- Daily weights  (standing if tolerated)  -- Strict I/Os; goal net negative 1.5 - 2 L / day  -- Cardiac diet w/ 2 g Na restriction      HTN (hypertension)  -- Continue home medications as tolerated  -- pt not tolerating PO medications, prn hydralazine ordered    Renal/  Acute renal failure  See microscopic polyangitis    Hyponatremia  RESOLVED  Patient presents with Na 126, baseline mid/high 130s. Now recovered to baseline. Symptoms include: None. Volume status: Eu    -- Urine Na, Osm  -- Serum Osm  -- Trend Na; goal correction < 6-8 units / 24 hours  -- legionella ordered    Acute renal failure superimposed on stage 3 chronic kidney disease  See microscopic polyangitis    Patient w/o CKD presenting with WILFREDO with rapidly increasing sCr (baseline sCr 1.0-1.2). New onset proteinuria/hematuria in last 30 days. .  DDx: ATN vs GN.     Serum creatinine: 4.2 mg/dL (H) 10/28/22 0304  Estimated creatinine clearance: 9.9 mL/min (A)    -- F/U serologies per Nephro (MITUL, ANCA, anti-GBM, IgA, C3, C4, HCV, HBV, cryo, RF)  -- Renal Bx done today  -- IV Solumedrol 1mg x3 days completed. Now continuing lower dose IV solumedrol in setting of pt's intolerance for PO meds. Will transition to PO pred once able.  -- Trend sCr  -- Strict I&Os  -- Avoid nephrotoxic agents  -- Renally adjust medications  -- added bicarb today  -- Nephro and Rheumatology following  -- HD done yesterday afternoon      CKD (chronic kidney disease), stage III  See microscopic polyangitis    ID  Septic shock  See acute hypoxemic respiratory failure    Immunology/Multi System  * Microscopic polyangiitis  As of 10/26/22 strongly positive MPO Ab (in contrast to borderline positive PR3), consistent with clinical picture of microscopic polyangiitis with pulmonary, and renal involvement. Trialysis catheter placed overnight with plans for plasmapheresis early this AM. Pt tolerated procedure well despite multiple attempts to place line. Went for IR Renal biopsy this AM.  Tolerated procedure well. Endorsing burning at szymanski insertion site. Continuing Azithromycin and Cefepime. Rheumatology planning to start rituximab and cyclophosphamide.     - Underwent Plasmapheresis 10/26, 10/27 w/ plans for 7 treatments over 14-day period (Schedule: Wed, Thurs, Fri, Sun, Tues, Wed, Fri)  - rituximab and cyclophosphamide initiated 10/28   - PJP prophylaxis with atovaquone via NG given pt's sulfa allergy.  - nephrology consulted  - rheumatology consulted  - continuing IV solumedrol given patient's intolerance to PO meds, will transition to Pred 60 mg daily once able.        Oncology  Acute blood loss anemia  resolved    Per GI, bleed remote, no evidence of acute re-bleed; patient has had normal stool for 10-14 days. No EGD this admission d/t PNA.    -- Continue recommended PO PPI QD  -- Monitor for bleed  -- Trend CBC; transfuse Hgb < 7  -- type and screen ordered    Endocrine  Moderate malnutrition  Nutrition consulted. Most recent weight and BMI monitored-          Malnutrition (Moderate to Severe)  Weight Loss (Malnutrition): 7.5% in 3 months              Measurements:  Wt Readings from Last 1 Encounters:   10/27/22 63.5 kg (139 lb 15.9 oz)   Body mass index is 26.45 kg/m².    Recommendations: Recommendation/Intervention: 1. Pending NGT placement. When able, initiate TFs. Rec'd Novasource @ 35 mL/hr to provide 1680 kcals, 76 g of protein, 602 mL fluid. 2. RD to monitor & follow-up.  Goals: Meet % EEN, EPN by RD f/u date    Patient has been screened and assessed by RD. RD will follow patient.      Hypothyroid  -- Continue home medications as tolerated  -- rechecking TSH today, unclear whether patient was taking this medication at home        Critical Care Daily Checklist:     A: Awake: RASS Goal/Actual Goal:    Actual: [place] Agitation Sedation Scale (RASS): Drowsy   B: Spontaneous Breathing Trial Performed?    C: SAT & SBT Coordinated?  NA                      D: Delirium: CAM-ICU  Overall CAM-ICU: Positive   E: Early Mobility Performed? No   F: Feeding Goal:    Status:         Current Diet Order   Procedures    Diet renal       AS: Analgesia/Sedation precedex   T: Thromboembolic Prophylaxis holding   H: HOB > 300 Yes   U: Stress Ulcer Prophylaxis (if needed) heparin   G: Glucose Control monitoring   B: Bowel Function Stool Occurrence: 1   I: Indwelling Catheter (Lines & Curry) Necessity LIJ trialysis x1 day  Midline x8 days  Urethral catheter x3 days      D: De-escalation of Antimicrobials/Pharmacotherapies Vanc  Azithromycin  atovaquone     Plan for the day/ETD NG/OG, improve nutritional status; rituximab     Code Status:  Family/Goals of Care: Full Code  Discussed with family at bedside.         Critical secondary to Patient has a condition that poses threat to life and bodily function: Acute Renal Failure and Respiratory Failure 2/2 microscopic polyangitis.           Critical care was time spent personally by me on the following activities: development of treatment plan with patient or surrogate and bedside caregivers, discussions with consultants, evaluation of patient's response to treatment, examination of patient, ordering and performing treatments and interventions, ordering and review of laboratory studies, ordering and review of radiographic studies, pulse oximetry, re-evaluation of patient's condition. This critical care time did not overlap with that of any other provider or involve time for any procedures.     Magui Cage MD  Critical Care Medicine  [facility] - Cardiac Medical ICU

## 2022-10-28 NOTE — ASSESSMENT & PLAN NOTE
As of 10/26/22 strongly positive MPO Ab (in contrast to borderline positive PR3), consistent with clinical picture of microscopic polyangiitis with pulmonary, and renal involvement. Trialysis catheter placed overnight with plans for plasmapheresis early this AM. Pt tolerated procedure well despite multiple attempts to place line. Went for IR Renal biopsy this AM. Tolerated procedure well. Endorsing burning at szymanski insertion site. Continuing Azithromycin and Cefepime. Rheumatology planning to start rituximab and cyclophosphamide.     - Underwent Plasmapheresis 10/26, 10/27 w/ plans for 7 treatments over 14-day period (Schedule: Wed, Thurs, Fri, Sun, Tues, Wed, Fri)  - rituximab and cyclophosphamide initiated 10/28   - PJP prophylaxis with atovaquone via NG given pt's sulfa allergy.  - nephrology consulted  - rheumatology consulted  - continuing IV solumedrol given patient's intolerance to PO meds, will transition to Pred 60 mg daily once able.

## 2022-10-28 NOTE — ASSESSMENT & PLAN NOTE
Microscopic polyangitis  Multifocal pneumonia  Hemoptysis    76 yo PMHx HTN, hypothyroid, HFpEF, OA admitted for fevers and respiratory symptoms, treated while on Hospital Medicine for multifocal PNA. Course complicated by GIB bleed (no EGD yet d/t PNA), hyponatremia, ATN. MICU consulted for rapidly increasing oxygen requirement (4L NC to BiPAP 15/10 50%), respiratory distress/work of breathing, somnolence. Nephrology consulted for ATN, concerns for possible nephritic disease as  behind ATN d/t acanthrocytes. Reports of scant hemoptysis by nursing staff 10/21, 10/22.    Imaging: CXR: Diffuse bilateral airspace infiltrates w/ c/f alveolar fillings; CT chest wc 10/17 with bl perihilar dense consolidations w/ peripheral patcy areas of GGO, BL PNA, less c/f cardiogenic pulmonary edema.  Labs: WBC uptrending 28.97, Hgb lowest 6.0 (s/p 2 u PRBC), continues to downtrend approx 1 point / day. sCr uptrending, (baseline 1.0-1.2). .      Recent Labs     10/26/22  1201   PH 7.263*   PCO2 40.9   PO2 39*   HCO3 18.5*   POCSATURATED 66*   BE -9     Etiology: Microscopic polyangitis likely. Concerns for possible PE as patient was not on thrombotic ppx s/o GIB. Incomplete I/Os charted, though reports of low UOP also renders pulmonary congestion edema as a possibility. Less c/f acute progression of multifocal PNA as adequate ABx coverage and no new fevers recently.    -- US DVT BLE unremarkable  -- Consideration for urgent BAL +/- repeat chest imaging (last done 10/17)  -- IV solumedrol   -- PJP prophylaxis  -- Rheumatology consulted, planning for rituximab  -- Per nephrology consulted, monitoring for HD needs  -- Plasmapheresis started 10/26, plans for 7 more cycles over 14 days.  -- Continue cefepime and azithromycin for treatment of multifocal PNA; ID consulted  -- Diuretic trial PRN  -- Wean supplemental O2 as tolerated  -- Neuro checks  -- supplemental O2 prn

## 2022-10-28 NOTE — ASSESSMENT & PLAN NOTE
Patient with baseline creatinine of 1 as recent as August 2022 with progressive worsening beginning in early October 1.3 (10/13)->1.6 (10/17)->3.0 (10/23) despite IV fluids.  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  Patient noted to have new onset proteinuria and hematuria over the last 1 month.  Additionally, would consider ATN in the setting of suspected sepsis from multifocal pneumonia.     Concerns for glomerulonephritis given patient's current clinical picture. Initial urine microscopy demonstrating muddy brown casts with likely acanthocytes noted as well. MITUL positive, proteinase 3 ab weakly positive, MPO strongly positive. Patient s/p high-dose IV solumedrol x3 doses, now on Solumedrol 50mg qd. Underwent kidney bx 10/27. Rheumatology consulted, and patient started on Plex, Ritux/cytoxan. Patient has received 2 rounds of Plex, as well as 1 dose of Ritux and cytoxan on 10/27. Given continually worsening renal function and uremic encephalopathy, patient underwent SLED without complication on 10/27. Will transition to iHD on 10/28.      Recommendations:  - will start iHD today  - continue plex, Ritux/cytoxan per rheum (next dose of ritux and cytoxan on 11/3)  - continue solumedrol  - strict intake and output  - renally dose medications and avoid nephrotoxins when possible  - replete electrolytes as appropriate

## 2022-10-28 NOTE — SUBJECTIVE & OBJECTIVE
Interval History/Significant Events: ON, pt vomited x1, NG tube was slightly misplaced. NG tube repositioned. She has received HD, 2nd plasma exchange, and first dose of rituximab and cyclophosphamide yesterday afternoon.     Review of Systems   Unable to perform ROS: Mental status change   Genitourinary:  Positive for dysuria.   Psychiatric/Behavioral:  Positive for confusion.    Objective:     Vital Signs (Most Recent):  Temp: 97.8 °F (36.6 °C) (10/28/22 0456)  Pulse: 71 (10/28/22 0730)  Resp: 15 (10/28/22 0730)  BP: (!) 152/65 (10/28/22 0600)  SpO2: 100 % (10/28/22 0730)   Vital Signs (24h Range):  Temp:  [96 °F (35.6 °C)-98.5 °F (36.9 °C)] 97.8 °F (36.6 °C)  Pulse:  [] 71  Resp:  [11-40] 15  SpO2:  [88 %-100 %] 100 %  BP: ()/(52-75) 152/65   Weight: 63.5 kg (139 lb 15.9 oz)  Body mass index is 26.45 kg/m².      Intake/Output Summary (Last 24 hours) at 10/28/2022 0848  Last data filed at 10/28/2022 0600  Gross per 24 hour   Intake 8340.7 ml   Output 5058 ml   Net 3282.7 ml       Physical Exam  Vitals and nursing note reviewed.   Constitutional:       General: She is not in acute distress.     Appearance: She is well-developed. She is ill-appearing and toxic-appearing.      Comments: Patient somnolent, moans intermittently    HENT:      Head: Normocephalic and atraumatic.      Mouth/Throat:      Mouth: Mucous membranes are dry.   Eyes:      Conjunctiva/sclera: Conjunctivae normal.   Cardiovascular:      Rate and Rhythm: Normal rate and regular rhythm.      Heart sounds: Normal heart sounds.   Pulmonary:      Effort: Pulmonary effort is normal. No respiratory distress.      Breath sounds: Rales present. No wheezing.   Abdominal:      General: Bowel sounds are normal. There is no distension.      Palpations: Abdomen is soft.      Tenderness: There is no abdominal tenderness.   Musculoskeletal:         General: No tenderness. Normal range of motion.      Cervical back: Normal range of motion and neck  supple.      Right lower leg: No edema.      Left lower leg: No edema.   Lymphadenopathy:      Cervical: No cervical adenopathy.   Skin:     General: Skin is warm and dry.      Capillary Refill: Capillary refill takes less than 2 seconds.      Findings: No rash.   Neurological:      General: No focal deficit present.      Mental Status: She is oriented to person, place, and time.   Psychiatric:      Comments: Patient somnolent, arouses to sternal rub, does not answer questions       Vents:  Oxygen Concentration (%): 50 (10/27/22 0505)  Lines/Drains/Airways       Central Venous Catheter Line  Duration             Trialysis (Dialysis) Catheter 10/25/22 2329 left internal jugular 2 days              Drain  Duration                  Urethral Catheter 10/23/22 2230 16 Fr. 4 days         NG/OG Tube 10/27/22 1300 nasogastric 14 Fr. Right nostril <1 day              Peripheral Intravenous Line  Duration                  Midline Catheter Insertion/Assessment  - Single Lumen 10/18/22 1831 Left brachial vein 18g x 10cm 9 days                  Significant Labs:    CBC/Anemia Profile:  Recent Labs   Lab 10/27/22  0418 10/28/22  0304   WBC 40.36* 38.44*   HGB 7.6* 6.4*   HCT 22.3* 19.2*   * 255   MCV 82 84   RDW 14.4 14.3        Chemistries:  Recent Labs   Lab 10/27/22  0418 10/27/22  1326 10/28/22  0304    139 142   K 4.1 3.7 4.1    98 108   CO2 20* 24 22*   * 159* 108*   CREATININE 6.1* 5.7* 4.2*   CALCIUM 8.4* 8.0* 7.4*   ALBUMIN 3.0* 3.2* 2.7*   PROT 6.4  --  5.1*   BILITOT 1.9*  --  1.1*   ALKPHOS 95  --  76   ALT 48*  --  42   AST 53*  --  67*   MG 3.1* 2.9* 2.3   PHOS 8.0* 8.6* 5.8*       CMP:   Recent Labs   Lab 10/27/22  0418 10/27/22  1326 10/28/22  0304    139 142   K 4.1 3.7 4.1    98 108   CO2 20* 24 22*   * 216* 171*   * 159* 108*   CREATININE 6.1* 5.7* 4.2*   CALCIUM 8.4* 8.0* 7.4*   PROT 6.4  --  5.1*   ALBUMIN 3.0* 3.2* 2.7*   BILITOT 1.9*  --  1.1*   ALKPHOS 95   --  76   AST 53*  --  67*   ALT 48*  --  42   ANIONGAP 17* 17* 12     All pertinent labs within the past 24 hours have been reviewed.    Significant Imaging:  I have reviewed all pertinent imaging results/findings within the past 24 hours.

## 2022-10-28 NOTE — SUBJECTIVE & OBJECTIVE
Interval History: Patient agitated overnight and placed back on precedex gtt. Received SLED, cytoxan and rituximab yesterday. Sedated and somnolent on interview this morning. Will plan for iHD session today.     Review of patient's allergies indicates:   Allergen Reactions    Ampicillin     Peaches [peach (prunus persica)] Other (See Comments)     Pt unable to state type of reaction. Information obtained from daughter who states she was informed of allergy from patient.    Penicillins      Other reaction(s): Hives    Sulfa (sulfonamide antibiotics) Rash and Hives     Current Facility-Administered Medications   Medication Frequency    0.9%  NaCl infusion (CRRT USE ONLY) Continuous    0.9%  NaCl infusion (for blood administration) Q24H PRN    0.9%  NaCl infusion (for blood administration) Q24H PRN    acetaminophen tablet 650 mg Q4H PRN    [START ON 10/30/2022] albumin human 5% bottle 50 g Once    albuterol-ipratropium 2.5 mg-0.5 mg/3 mL nebulizer solution 3 mL Q4H PRN    aluminum-magnesium hydroxide-simethicone 200-200-20 mg/5 mL suspension 30 mL QID PRN    atovaquone 750 mg/5 mL oral liquid 1,500 mg Daily    azithromycin (ZITHROMAX) 250 mg in dextrose 5 % 250 mL IVPB Q24H    bisacodyL suppository 10 mg Daily PRN    [START ON 10/29/2022] calcium gluconate 1 g in sodium chloride 0.9% 100 mL IVPB Once    [START ON 10/30/2022] calcium gluconate 1 g in sodium chloride 0.9% 100 mL IVPB Once    cefepime in dextrose 5 % 1 gram/50 mL IVPB 1 g Q24H    cloNIDine tablet 0.2 mg Q4H PRN    dexmedetomidine (PRECEDEX) 400mcg/100mL 0.9% NaCL infusion Continuous    dextrose 10% bolus 125 mL PRN    dextrose 10% bolus 250 mL PRN    [START ON 10/29/2022] diphenhydrAMINE injection 25 mg Once    [START ON 10/30/2022] diphenhydrAMINE injection 25 mg Once    glucagon (human recombinant) injection 1 mg PRN    glucose chewable tablet 16 g PRN    glucose chewable tablet 24 g PRN    [START ON 10/29/2022] heparin (porcine) injection 3,200 Units  Once    [START ON 10/30/2022] heparin (porcine) injection 3,200 Units Once    hydrALAZINE injection 10 mg Q8H PRN    insulin aspart U-100 pen 1-10 Units Q6H PRN    levothyroxine tablet 25 mcg Before breakfast    magnesium sulfate 2g in water 50mL IVPB (premix) PRN    melatonin tablet 6 mg Nightly PRN    meperidine injection 25 mg PRN    methocarbamoL tablet 500 mg TID PRN    methylPREDNISolone sodium succinate injection 50 mg Daily    naloxone 0.4 mg/mL injection 0.02 mg PRN    ondansetron disintegrating tablet 8 mg Q8H PRN    pantoprazole injection 40 mg Daily    prochlorperazine injection Soln 5 mg Q6H PRN    simethicone chewable tablet 80 mg QID PRN    sodium chloride 0.9% 250 mL flush bag 1 [facility] in Clinic[facility]    sodium chloride 0.9% flush 10 mL PRN    sodium chloride 0.9% flush 5 mL PRN    sodium phosphate 20.01 mmol in dextrose 5 % 250 mL IVPB PRN    sodium phosphate 30 mmol in dextrose 5 % 250 mL IVPB PRN    sodium phosphate 39.99 mmol in dextrose 5 % 250 mL IVPB PRN     Facility-Administered Medications Ordered in Other Encounters   Medication Frequency    celecoxib capsule 400 mg Once    fentaNYL 50 mcg/mL injection  mcg PRN    LIDOcaine (PF) 10 mg/ml (1%) injection 10 mg Once PRN    LIDOcaine (PF) 10 mg/ml (1%) injection 10 mg Once    midazolam (VERSED) 1 mg/mL injection 0.5-4 mg PRN    ropivacaine 0.2% [facility] PainPRO Pump infusion 500 ML Continuous       Objective:     Vital Signs (Most Recent):  Temp: 97.4 °F (36.3 °C) (10/28/22 0800)  Pulse: 71 (10/28/22 0900)  Resp: 15 (10/28/22 0900)  BP: (!) 156/70 (10/28/22 0900)  SpO2: 100 % (10/28/22 0900)  O2 Device (Oxygen Therapy): nasal cannula w/ humidification (10/28/22 0900)   Vital Signs (24h Range):  Temp:  [96 °F (35.6 °C)-98.5 °F (36.9 °C)] 97.4 °F (36.3 °C)  Pulse:  [] 71  Resp:  [11-40] 15  SpO2:  [88 %-100 %] 100 %  BP: ()/(52-75) 156/70     Weight: 63.5 kg (139 lb 15.9 oz) (10/27/22 1334)  Body mass index is 26.45 kg/m².  Body  surface area is 1.65 meters squared.    I/O last 3 completed shifts:  In: 8565.6 [I.V.:1036.6; Blood:5648; NG/GT:495; IV Piggyback:1386]  Out: 5443 [Urine:1460; Other:1108; Blood:2875]    Physical Exam  Vitals and nursing note reviewed.   Constitutional:       General: She is not in acute distress.     Appearance: She is well-developed. She is ill-appearing and toxic-appearing.      Comments: Patient somnolent   HENT:      Head: Normocephalic and atraumatic.      Mouth/Throat:      Mouth: Mucous membranes are dry.   Eyes:      Conjunctiva/sclera: Conjunctivae normal.   Cardiovascular:      Rate and Rhythm: Normal rate and regular rhythm.      Heart sounds: Normal heart sounds.   Pulmonary:      Effort: Pulmonary effort is normal. No respiratory distress.      Breath sounds: Rales present. No wheezing.   Abdominal:      General: Bowel sounds are normal. There is no distension.      Palpations: Abdomen is soft.      Tenderness: There is no abdominal tenderness.   Musculoskeletal:         General: No tenderness. Normal range of motion.      Cervical back: Normal range of motion and neck supple.      Right lower leg: No edema.      Left lower leg: No edema.   Lymphadenopathy:      Cervical: No cervical adenopathy.   Skin:     General: Skin is warm and dry.      Capillary Refill: Capillary refill takes less than 2 seconds.      Findings: No rash.   Neurological:      General: No focal deficit present.      Mental Status: She is oriented to person, place, and time.   Psychiatric:      Comments: Patient somnolent, arouses to sternal rub, does not answer questions   No significant change in exam today over yesterday.     Significant Labs:  CBC:   Recent Labs   Lab 10/28/22  0304   WBC 38.44*   RBC 2.28*   HGB 6.4*   HCT 19.2*      MCV 84   MCH 28.1   MCHC 33.3       CMP:   Recent Labs   Lab 10/28/22  0304   *   CALCIUM 7.4*   ALBUMIN 2.7*   PROT 5.1*      K 4.1   CO2 22*      *   CREATININE  4.2*   ALKPHOS 76   ALT 42   AST 67*   BILITOT 1.1*       All labs within the past 24 hours have been reviewed.     Significant Imaging:

## 2022-10-28 NOTE — PLAN OF CARE
CMICU DAILY GOALS       A: Awake    RASS: Goal -    Actual - RASS ([place] Agitation-Sedation Scale): -1-->drowsy   Restraint necessity:    B: Breathe   SBT: Not intubated   C: Coordinate A & B, analgesics/sedatives   Pain: managed    SAT: Not intubated  D: Delirium   CAM-ICU: Overall CAM-ICU: Positive  E: Early(intubated/ Progressive (non-intubated) Mobility   MOVE Screen: Pass   Activity: Activity Management: Rolling - L1  FAS: Feeding/Nutrition   Diet order: Diet/Nutrition Received: tube feeding, Specialty Diet/Nutrition Received: renal diet  T: Thrombus   DVT prophylaxis: VTE Required Core Measure: Pharmacological prophylaxis initiated/maintained  H: HOB Elevation   Head of Bed (HOB) Positioning: HOB at 30-45 degrees  U: Ulcer Prophylaxis   GI: yes  G: Glucose control   managed Glycemic Management: blood glucose monitored  S: Skin   Bathing/Skin Care: incontinence care  Device Skin Pressure Protection: absorbent pad utilized/changed, skin-to-skin areas padded, skin-to-device areas padded, pressure points protected, positioning supports utilized  Pressure Reduction Devices: pressure-redistributing mattress utilized, positioning supports utilized, heel offloading device utilized, foam padding utilized  Pressure Reduction Techniques: frequent weight shift encouraged, weight shift assistance provided  Skin Protection: adhesive use limited, tubing/devices free from skin contact, transparent dressing maintained, skin-to-skin areas padded, skin-to-device areas padded, incontinence pads utilized  B: Bowel Function   no issues   I: Indwelling Catheters   Curry necessity:      Urethral Catheter 10/23/22 2230 16 Fr.-Reason for Continuing Urinary Catheterization: Critically ill in ICU and requiring hourly monitoring of intake/output   CVC necessity: Yes  D: De-escalation Antibiotics   Yes    Family/Goals of care/Code Status   Code Status: Full Code    24H Vital Sign Range  Temp:  [96 °F (35.6 °C)-98.5 °F (36.9 °C)]    Pulse:  []   Resp:  [9-40]   BP: ()/(52-75)   SpO2:  [88 %-100 %]      Shift Events   No acute events throughout shift    VS and assessment per flow sheet, patient progressing towards goals as tolerated, plan of care reviewed with Ms. Goodman, all concerns addressed, will continue to monitor.    Charleen Interiano

## 2022-10-28 NOTE — PROGRESS NOTES
[facility] - Card[facility] Medical ICU  Nephrology  Progress Note    Patient Name: Kristin Goodman  MRN: [id number]  Admission Date: 10/17/2022  Hospital Length of Stay: 11 days  Attending Provider: Fox Holbrook MD   Primary Care Physician: Lori Hernandez MD  Principal Problem:Microscopic polyangiitis    Subjective:     HPI: Kristin Goodman is a 77 year old female with hypertension, hypothyroidism, HFpEF who presents for cough, shortness of breath, and weakness.  Patient initially presented to ER on 09/24 with hemoptysis and imaging concerning for multilobar pneumonia at which time she was discharged on a 10 day course of levofloxacin.  Patient initially had some improvement but began having worsening symptoms on 10/14.  Patient describes multiple fevers and progressive shortness of breath.  She continues to have intermittent hemoptysis.  Patient with worsening leukocytosis and limited clinical improvement despite broad-spectrum antibiotics.  She remains on cefepime.  Patient is a noted to have baseline creatinine of 1 as recent as 8/2022 but progressive worsening of creatinine in early October.  On admission patient with creatinine of 1.6 (10/17) with subsequent progression and creatinine of 3.0 at time of consultation (10/23).  Nephrology consulted for acute kidney injury.      Interval History: Patient agitated overnight and placed back on precedex gtt. Received SLED, cytoxan and rituximab yesterday. Sedated and somnolent on interview this morning. Will plan for iHD session today.     Review of patient's allergies indicates:   Allergen Reactions    Ampicillin     Peaches [peach (prunus persica)] Other (See Comments)     Pt unable to state type of reaction. Information obtained from daughter who states she was informed of allergy from patient.    Penicillins      Other reaction(s): Hives    Sulfa (sulfonamide antibiotics) Rash and Hives     Current Facility-Administered Medications   Medication Frequency    0.9%   NaCl infusion (CRRT USE ONLY) Continuous    0.9%  NaCl infusion (for blood administration) Q24H PRN    0.9%  NaCl infusion (for blood administration) Q24H PRN    acetaminophen tablet 650 mg Q4H PRN    [START ON 10/30/2022] albumin human 5% bottle 50 g Once    albuterol-ipratropium 2.5 mg-0.5 mg/3 mL nebulizer solution 3 mL Q4H PRN    aluminum-magnesium hydroxide-simethicone 200-200-20 mg/5 mL suspension 30 mL QID PRN    atovaquone 750 mg/5 mL oral liquid 1,500 mg Daily    azithromycin (ZITHROMAX) 250 mg in dextrose 5 % 250 mL IVPB Q24H    bisacodyL suppository 10 mg Daily PRN    [START ON 10/29/2022] calcium gluconate 1 g in sodium chloride 0.9% 100 mL IVPB Once    [START ON 10/30/2022] calcium gluconate 1 g in sodium chloride 0.9% 100 mL IVPB Once    cefepime in dextrose 5 % 1 gram/50 mL IVPB 1 g Q24H    cloNIDine tablet 0.2 mg Q4H PRN    dexmedetomidine (PRECEDEX) 400mcg/100mL 0.9% NaCL infusion Continuous    dextrose 10% bolus 125 mL PRN    dextrose 10% bolus 250 mL PRN    [START ON 10/29/2022] diphenhydrAMINE injection 25 mg Once    [START ON 10/30/2022] diphenhydrAMINE injection 25 mg Once    glucagon (human recombinant) injection 1 mg PRN    glucose chewable tablet 16 g PRN    glucose chewable tablet 24 g PRN    [START ON 10/29/2022] heparin (porcine) injection 3,200 Units Once    [START ON 10/30/2022] heparin (porcine) injection 3,200 Units Once    hydrALAZINE injection 10 mg Q8H PRN    insulin aspart U-100 pen 1-10 Units Q6H PRN    levothyroxine tablet 25 mcg Before breakfast    magnesium sulfate 2g in water 50mL IVPB (premix) PRN    melatonin tablet 6 mg Nightly PRN    meperidine injection 25 mg PRN    methocarbamoL tablet 500 mg TID PRN    methylPREDNISolone sodium succinate injection 50 mg Daily    naloxone 0.4 mg/mL injection 0.02 mg PRN    ondansetron disintegrating tablet 8 mg Q8H PRN    pantoprazole injection 40 mg Daily    prochlorperazine injection Soln 5 mg Q6H  PRN    simethicone chewable tablet 80 mg QID PRN    sodium chloride 0.9% 250 mL flush bag 1 time in Clinic/[facility]    sodium chloride 0.9% flush 10 mL PRN    sodium chloride 0.9% flush 5 mL PRN    sodium phosphate 20.01 mmol in dextrose 5 % 250 mL IVPB PRN    sodium phosphate 30 mmol in dextrose 5 % 250 mL IVPB PRN    sodium phosphate 39.99 mmol in dextrose 5 % 250 mL IVPB PRN     Facility-Administered Medications Ordered in Other Encounters   Medication Frequency    celecoxib capsule 400 mg Once    fentaNYL 50 mcg/mL injection  mcg PRN    LIDOcaine (PF) 10 mg/ml (1%) injection 10 mg Once PRN    LIDOcaine (PF) 10 mg/ml (1%) injection 10 mg Once    midazolam (VERSED) 1 mg/mL injection 0.5-4 mg PRN    ropivacaine 0.2% [facility] PainPRO Pump infusion 500 ML Continuous       Objective:     Vital Signs (Most Recent):  Temp: 97.4 °F (36.3 °C) (10/28/22 0800)  Pulse: 71 (10/28/22 0900)  Resp: 15 (10/28/22 0900)  BP: (!) 156/70 (10/28/22 0900)  SpO2: 100 % (10/28/22 0900)  O2 Device (Oxygen Therapy): nasal cannula w/ humidification (10/28/22 0900)   Vital Signs (24h Range):  Temp:  [96 °F (35.6 °C)-98.5 °F (36.9 °C)] 97.4 °F (36.3 °C)  Pulse:  [] 71  Resp:  [11-40] 15  SpO2:  [88 %-100 %] 100 %  BP: ()/(52-75) 156/70     Weight: 63.5 kg (139 lb 15.9 oz) (10/27/22 1334)  Body mass index is 26.45 kg/m².  Body surface area is 1.65 meters squared.    I/O last 3 completed shifts:  In: 8565.6 [I.V.:1036.6; Blood:5648; NG/GT:495; IV Piggyback:1386]  Out: 5443 [Urine:1460; Other:1108; Blood:2875]    Physical Exam  Vitals and nursing note reviewed.   Constitutional:       General: She is not in acute distress.     Appearance: She is well-developed. She is ill-appearing and toxic-appearing.      Comments: Patient somnolent   HENT:      Head: Normocephalic and atraumatic.      Mouth/Throat:      Mouth: Mucous membranes are dry.   Eyes:      Conjunctiva/sclera: Conjunctivae normal.   Cardiovascular:      Rate  and Rhythm: Normal rate and regular rhythm.      Heart sounds: Normal heart sounds.   Pulmonary:      Effort: Pulmonary effort is normal. No respiratory distress.      Breath sounds: Rales present. No wheezing.   Abdominal:      General: Bowel sounds are normal. There is no distension.      Palpations: Abdomen is soft.      Tenderness: There is no abdominal tenderness.   Musculoskeletal:         General: No tenderness. Normal range of motion.      Cervical back: Normal range of motion and neck supple.      Right lower leg: No edema.      Left lower leg: No edema.   Lymphadenopathy:      Cervical: No cervical adenopathy.   Skin:     General: Skin is warm and dry.      Capillary Refill: Capillary refill takes less than 2 seconds.      Findings: No rash.   Neurological:      General: No focal deficit present.      Mental Status: She is oriented to person, place, and time.   Psychiatric:      Comments: Patient somnolent, arouses to sternal rub, does not answer questions   No significant change in exam today over yesterday.     Significant Labs:  CBC:   Recent Labs   Lab 10/28/22  0304   WBC 38.44*   RBC 2.28*   HGB 6.4*   HCT 19.2*      MCV 84   MCH 28.1   MCHC 33.3       CMP:   Recent Labs   Lab 10/28/22  0304   *   CALCIUM 7.4*   ALBUMIN 2.7*   PROT 5.1*      K 4.1   CO2 22*      *   CREATININE 4.2*   ALKPHOS 76   ALT 42   AST 67*   BILITOT 1.1*       All labs within the past 24 hours have been reviewed.     Significant Imaging:       Assessment/Plan:     Acute renal failure superimposed on stage 3 chronic kidney disease  Patient with baseline creatinine of 1 as recent as August 2022 with progressive worsening beginning in early October 1.3 (10/13)->1.6 (10/17)->3.0 (10/23) despite IV fluids.  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  Patient noted to have new onset proteinuria and hematuria over the last 1 month.   Additionally, would consider ATN in the setting of suspected sepsis from multifocal pneumonia.     Concerns for glomerulonephritis given patient's current clinical picture. Initial urine microscopy demonstrating muddy brown casts with likely acanthocytes noted as well. MITUL positive, proteinase 3 ab weakly positive, MPO strongly positive. Patient s/p high-dose IV solumedrol x3 doses, now on Solumedrol 50mg qd. Underwent kidney bx 10/27. Rheumatology consulted, and patient started on Plex, Ritux/cytoxan. Patient has received 2 rounds of Plex, as well as 1 dose of Ritux and cytoxan on 10/27. Given continually worsening renal function and uremic encephalopathy, patient underwent SLED without complication on 10/27. Will transition to iHD on 10/28.      Recommendations:  - will start iHD today  - continue plex, Ritux/cytoxan per rheum (next dose of ritux and cytoxan on 11/3)  - continue solumedrol  - strict intake and output  - renally dose medications and avoid nephrotoxins when possible  - replete electrolytes as appropriate        Thank you for your consult. I will follow-up with patient. Please contact us if you have any additional questions.    Bipin Frias MD  Nephrology  [facility]

## 2022-10-28 NOTE — PROGRESS NOTES
"[facility] - Card[facility] Medical ICU  Rheumatology  Progress Note    Patient Name: Kristin Goodman  MRN: [id number]  Admission Date: 10/17/2022  Hospital Length of Stay: 11 days  Code Status: Full Code   Attending Provider: Fox Holbrook MD  Primary Care Physician: Lori Hernandez MD  Principal Problem: Microscopic polyangiitis    Subjective:     HPI: Patient is a 76 yo F with chronic diastolic heart failure, HTN, OA, who is admitted for respiratory failure. Rheumatology is consulted due to concern for vasculitis. Patient initially presented to the ED on 9/24/22 complaining of a week of cough followed by an episode of hemoptysis on 9/24 prompting the ED visit. They did a CTA which showed multifocal PNA. She was sent home with Levuin. She followed up with PCP on 10/4/22 and was feeling better. Then she presented to the ED again on 10/14 and on 10/17 complaining of dyspnea. She had fevers in the few days leading up to admission of 102. She endorsed weakness, productive cough, dyspnea, and loss of appetite. Pt initally at 88% O2 saturation and improved to 98% on 2L NC. She was admitted for IV antibiotics. CT chest with contrast on 10/17 showed evidence of interval progression of bilateral perihilar dense consolidation with peripheral patchy areas of ground-glass opacification nonspecific as to the etiology.  Bilateral pneumonia as well as inflammatory etiologies considered.  Cardiogenic pulmonary edema considered less likely given the pattern.    On 10/19/22 she started having melena. Hb dropped to 6. She got 2 units pRBCs. GI was consulted. They noted "Colonoscopy in 2020 showed internal hemorrhoids and mild sigmoid diverticulosis. EGD in 2012 showed gastritis, biopsies positive for H. Pylori." "We considered doing EGD now, but from a pulmonary standpoint I do not think she is stable enough with the current pneumonia, therefore would recommend that we just follow the patient, treat with a PPI in case there is any " "peptic ulcer disease."    On 10/21/22 ENT was consulted due to left sided otalgia the day prior which resolved. She also was complaining of cervical adenopathy and sore throat. They did not recommend surgical intervention and felt that adenopathy was likely reactive.     On 10/23/22 ID was consulted. They recommended checking respiratory infection panel and fungal markers.    On 10/23/22 Nephrology was consulted due to worsening creatinine. They noted, "Patient is a noted to have baseline creatinine of 1 as recent as 8/2022 but progressive worsening of creatinine in early October.  On admission patient with creatinine of 1.6 (10/17) with subsequent progression and creatinine of 3.0 at time of consultation (10/23).  Nephrology consulted for acute kidney injury." "Patient with ATN nephritic picture in urine sediment, prot/crea ratio pending. Had hematuria in recent past but in context with positive urine cultures. Now definitely more dysmorphic red cells that wbcs in the urine. 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    On 10/23/22, she was transferred to ICU due to desaturations and respiratory distress. Pulmonologist noted that her respiratory status is too tenuous for bronchoscopy. She was stabilized with non-invasive ventilation and nasal cannula oxygen.      Interval History: She is stable. She complains of being tired and wants to go home.    Current Facility-Administered Medications   Medication Frequency    0.9%  NaCl infusion (CRRT USE ONLY) Continuous    0.9%  NaCl infusion (for blood administration) Q24H PRN    0.9%  NaCl infusion (for blood administration) Q24H PRN    acetaminophen tablet 650 mg Q4H PRN    [START ON 10/30/2022] albumin human 5% bottle 50 g Once    albuterol-ipratropium 2.5 mg-0.5 mg/3 mL nebulizer solution 3 mL Q4H PRN    aluminum-magnesium " hydroxide-simethicone 200-200-20 mg/5 mL suspension 30 mL QID PRN    atovaquone 750 mg/5 mL oral liquid 1,500 mg Daily    azithromycin (ZITHROMAX) 250 mg in dextrose 5 % 250 mL IVPB Q24H    bisacodyL suppository 10 mg Daily PRN    [START ON 10/29/2022] calcium gluconate 1 g in sodium chloride 0.9% 100 mL IVPB Once    [START ON 10/30/2022] calcium gluconate 1 g in sodium chloride 0.9% 100 mL IVPB Once    cefepime in dextrose 5 % 1 gram/50 mL IVPB 1 g Q24H    cloNIDine tablet 0.2 mg Q4H PRN    dexmedetomidine (PRECEDEX) 400mcg/100mL 0.9% NaCL infusion Continuous    dextrose 10% bolus 125 mL PRN    dextrose 10% bolus 250 mL PRN    [START ON 10/29/2022] diphenhydrAMINE injection 25 mg Once    [START ON 10/30/2022] diphenhydrAMINE injection 25 mg Once    glucagon (human recombinant) injection 1 mg PRN    glucose chewable tablet 16 g PRN    glucose chewable tablet 24 g PRN    [START ON 10/29/2022] heparin (porcine) injection 3,200 Units Once    [START ON 10/30/2022] heparin (porcine) injection 3,200 Units Once    hydrALAZINE injection 10 mg Q8H PRN    insulin aspart U-100 pen 1-10 Units Q6H PRN    levothyroxine tablet 25 mcg Before breakfast    magnesium sulfate 2g in water 50mL IVPB (premix) PRN    melatonin tablet 6 mg Nightly PRN    meperidine injection 25 mg PRN    methocarbamoL tablet 500 mg TID PRN    methylPREDNISolone sodium succinate injection 50 mg Daily    naloxone 0.4 mg/mL injection 0.02 mg PRN    ondansetron disintegrating tablet 8 mg Q8H PRN    pantoprazole injection 40 mg Daily    prochlorperazine injection Soln 5 mg Q6H PRN    simethicone chewable tablet 80 mg QID PRN    sodium chloride 0.9% 250 mL flush bag [facility]    sodium chloride 0.9% flush 10 mL PRN    sodium chloride 0.9% flush 5 mL PRN    sodium phosphate 20.01 mmol in dextrose 5 % 250 mL IVPB PRN    sodium phosphate 30 mmol in dextrose 5 % 250 mL IVPB PRN    sodium phosphate 39.99 mmol in dextrose  5 % 250 mL IVPB PRN     Facility-Administered Medications Ordered in Other Encounters   Medication Frequency    celecoxib capsule 400 mg Once    fentaNYL 50 mcg/mL injection  mcg PRN    LIDOcaine (PF) 10 mg/ml (1%) injection 10 mg Once PRN    LIDOcaine (PF) 10 mg/ml (1%) injection 10 mg Once    midazolam (VERSED) 1 mg/mL injection 0.5-4 mg PRN    ropivacaine 0.2% [facility] PainPRO Pump infusion 500 ML Continuous     Objective:     Vital Signs (Most Recent):  Temp: 97.8 °F (36.6 °C) (10/28/22 0456)  Pulse: 71 (10/28/22 0730)  Resp: 15 (10/28/22 0730)  BP: (!) 152/65 (10/28/22 0600)  SpO2: 100 % (10/28/22 0730)  O2 Device (Oxygen Therapy): nasal cannula w/ humidification (10/28/22 0730)   Vital Signs (24h Range):  Temp:  [96 °F (35.6 °C)-98.5 °F (36.9 °C)] 97.8 °F (36.6 °C)  Pulse:  [] 71  Resp:  [11-40] 15  SpO2:  [88 %-100 %] 100 %  BP: ()/(52-75) 152/65     Weight: 63.5 kg (139 lb 15.9 oz) (10/27/22 1334)  Body mass index is 26.45 kg/m².  Body surface area is 1.65 meters squared.      Intake/Output Summary (Last 24 hours) at 10/28/2022 0908  Last data filed at 10/28/2022 0600  Gross per 24 hour   Intake 8340.7 ml   Output 5058 ml   Net 3282.7 ml       Physical Exam   Constitutional: She is oriented to person, place, and time. No distress.   HENT:   Head: Normocephalic and atraumatic.   Eyes: Pupils are equal, round, and reactive to light.   Cardiovascular: Normal rate and regular rhythm.   No murmur heard.  Pulmonary/Chest: Effort normal. No respiratory distress. She has no wheezes. She has rales.   Abdominal: Soft. Bowel sounds are normal. There is no abdominal tenderness.   Musculoskeletal:         General: No deformity. Normal range of motion.      Cervical back: Normal range of motion.   Neurological: She is alert and oriented to person, place, and time.   Skin: Skin is warm and dry.   Psychiatric: Affect and judgment normal.     Significant Labs:  CBC:   Recent Labs   Lab 10/28/22  0304    WBC 38.44*   HGB 6.4*   HCT 19.2*        CMP:   Recent Labs   Lab 10/28/22  0304   *   CALCIUM 7.4*   ALBUMIN 2.7*   PROT 5.1*      K 4.1   CO2 22*      *   CREATININE 4.2*   ALKPHOS 76   ALT 42   AST 67*   BILITOT 1.1*       Significant Imaging:  Imaging results within the past 24 hours have been reviewed.    Assessment/Plan:     Acute hypoxemic respiratory failure  Patient is a 76 yo F with chronic diastolic heart failure, HTN, OA, who is admitted for respiratory failure. Rheumatology is consulted due to concern for vasculitis. Patient presented with cough and hemoptysis since 9/24/22. She was admitted due to dyspnea and hypoxia on 10/17. She has had Hb drop to 6 this admission requiring blood transfusions. Reviewed her chest imaging which shows progressive disease from 10/14 until now which can be concerning for DAH. This might also explain the Hb drop. No bronch planned for now due to her tenuous respiratory status. GI defers invasive workup for now because she is not stable enough. She has had increasing creatinine from baseline of 1 to 3.0 on 10/23/22. Nephrology concerned for ATN nephritic picture in urine sediment. Pending kidney biopsy result.    UA: 1+ blood, 88 RBCs, 18 WBCs  UPCR 1.54  RF and CCP negative  MITUL+ 1:2560 homogenous, +dsDNA, complements normal  PR3 slightly elevated at 1.2 (reference positive is 1.0)  MPO elevated at 132  C-ANCA+ 1:80  P-ANCA-  GBM antibodies negative  Quantiferon indeterminate    Pending: cryoglobulins, T-spot    Treating empirically for ANCA vasculitis while awaiting kidney biopsy results:  - s/p IV Solumedrol 1000 mg x3 doses. Now on IV Solumedrol 50 mg daily.  - PLEX 10/26, 10/27, next planned for 10/29  - Started SLED 10/27  - Rituximab 375 mg/m^2 (600 mg) on 10/27 (every 7 day dose 1 of 4)  - Cyclophosphamide 7.5 mg/kg on 10/27 (every 14 day dose 1 of 2)    Recommendations:  1. She has had 7 days of high dose steroid so we can start  tapering using the PEXIVAS trial taper as described below. Please give prednisone 30 mg daily or IV Solu-Medrol 24 mg daily.  2. Atovaquone 1500 mg and Protonix daily while on high dose steroids.  3. Next Rituximab 275 mg/m^2 due on November 3  4. Next cyclophosphamide 7.5 mg/kg due on November 10  5. Monitor CBC and CMP in 10-14 days after giving chemotherapy  6. Follow up kidney biopsy          Discussed with attending physician, Dr. Palacio. Attending attestation to follow.    Layne Aguirre MD  Rheumatology Fellow  PGY-5            Layne Aguirre MD  Rheumatology  [facility] - Cardiac Medical ICU

## 2022-10-28 NOTE — ASSESSMENT & PLAN NOTE
Patient is a 78 yo F with chronic diastolic heart failure, HTN, OA, who is admitted for respiratory failure. Rheumatology is consulted due to concern for vasculitis. Patient presented with cough and hemoptysis since 9/24/22. She was admitted due to dyspnea and hypoxia on 10/17. She has had Hb drop to 6 this admission requiring blood transfusions. Reviewed her chest imaging which shows progressive disease from 10/14 until now which can be concerning for DAH. This might also explain the Hb drop. No bronch planned for now due to her tenuous respiratory status. GI defers invasive workup for now because she is not stable enough. She has had increasing creatinine from baseline of 1 to 3.0 on 10/23/22. Nephrology concerned for ATN nephritic picture in urine sediment. Pending kidney biopsy result.    UA: 1+ blood, 88 RBCs, 18 WBCs  UPCR 1.54  RF and CCP negative  MITUL+ 1:2560 homogenous, +dsDNA, complements normal  PR3 slightly elevated at 1.2 (reference positive is 1.0)  MPO elevated at 132  C-ANCA+ 1:80  P-ANCA-  GBM antibodies negative  Quantiferon indeterminate    Pending: cryoglobulins, T-spot    Treating empirically for ANCA vasculitis while awaiting kidney biopsy results:  - s/p IV Solumedrol 1000 mg x3 doses. Now on IV Solumedrol 50 mg daily.  - PLEX 10/26, 10/27, next planned for 10/29  - Started SLED 10/27  - Rituximab 375 mg/m^2 (600 mg) on 10/27 (every 7 day dose 1 of 4)  - Cyclophosphamide 7.5 mg/kg on 10/27 (every 14 day dose 1 of 2)    Recommendations:  1. She has had 7 days of high dose steroid so we can start tapering using the PEXIVAS trial taper as described below. Please give prednisone 30 mg daily or IV Solu-Medrol 24 mg daily.  2. Atovaquone 1500 mg and Protonix daily while on high dose steroids.  3. Next Rituximab 275 mg/m^2 due on November 3  4. Next cyclophosphamide 7.5 mg/kg due on November 10  5. Monitor CBC and CMP in 10-14 days after giving chemotherapy  6. Follow up kidney  biopsy          Discussed with attending physician, Dr. Palacio. Attending attestation to follow.    Layne Aguirre MD  Rheumatology Fellow  PGY-5

## 2022-10-28 NOTE — PROGRESS NOTES
10/27/22 2006   Treatment   Treatment Type SLED   CRRT Comments CRRT 4 hrs TX complete       Pt tolerated TX well; pt stable at this time; see flow sheet for details.

## 2022-10-28 NOTE — ASSESSMENT & PLAN NOTE
Nutrition consulted. Most recent weight and BMI monitored-          Malnutrition (Moderate to Severe)  Weight Loss (Malnutrition): 7.5% in 3 months              Measurements:  Wt Readings from Last 1 Encounters:   10/27/22 63.5 kg (139 lb 15.9 oz)   Body mass index is 26.45 kg/m².    Recommendations: Recommendation/Intervention: 1. Pending NGT placement. When able, initiate TFs. Rec'd Novasource @ 35 mL/hr to provide 1680 kcals, 76 g of protein, 602 mL fluid. 2. RD to monitor & follow-up.  Goals: Meet % EEN, EPN by RD f/u date    Patient has been screened and assessed by RD. RD will follow patient.

## 2022-10-28 NOTE — SUBJECTIVE & OBJECTIVE
Interval History: She is stable. She complains of being tired and wants to go home.    Current Facility-Administered Medications   Medication Frequency    0.9%  NaCl infusion (CRRT USE ONLY) Continuous    0.9%  NaCl infusion (for blood administration) Q24H PRN    0.9%  NaCl infusion (for blood administration) Q24H PRN    acetaminophen tablet 650 mg Q4H PRN    [START ON 10/30/2022] albumin human 5% bottle 50 g Once    albuterol-ipratropium 2.5 mg-0.5 mg/3 mL nebulizer solution 3 mL Q4H PRN    aluminum-magnesium hydroxide-simethicone 200-200-20 mg/5 mL suspension 30 mL QID PRN    atovaquone 750 mg/5 mL oral liquid 1,500 mg Daily    azithromycin (ZITHROMAX) 250 mg in dextrose 5 % 250 mL IVPB Q24H    bisacodyL suppository 10 mg Daily PRN    [START ON 10/29/2022] calcium gluconate 1 g in sodium chloride 0.9% 100 mL IVPB Once    [START ON 10/30/2022] calcium gluconate 1 g in sodium chloride 0.9% 100 mL IVPB Once    cefepime in dextrose 5 % 1 gram/50 mL IVPB 1 g Q24H    cloNIDine tablet 0.2 mg Q4H PRN    dexmedetomidine (PRECEDEX) 400mcg/100mL 0.9% NaCL infusion Continuous    dextrose 10% bolus 125 mL PRN    dextrose 10% bolus 250 mL PRN    [START ON 10/29/2022] diphenhydrAMINE injection 25 mg Once    [START ON 10/30/2022] diphenhydrAMINE injection 25 mg Once    glucagon (human recombinant) injection 1 mg PRN    glucose chewable tablet 16 g PRN    glucose chewable tablet 24 g PRN    [START ON 10/29/2022] heparin (porcine) injection 3,200 Units Once    [START ON 10/30/2022] heparin (porcine) injection 3,200 Units Once    hydrALAZINE injection 10 mg Q8H PRN    insulin aspart U-100 pen 1-10 Units Q6H PRN    levothyroxine tablet 25 mcg Before breakfast    magnesium sulfate 2g in water 50mL IVPB (premix) PRN    melatonin tablet 6 mg Nightly PRN    meperidine injection 25 mg PRN    methocarbamoL tablet 500 mg TID PRN    methylPREDNISolone sodium succinate injection 50 mg Daily    naloxone 0.4 mg/mL injection 0.02 mg PRN     ondansetron disintegrating tablet 8 mg Q8H PRN    pantoprazole injection 40 mg Daily    prochlorperazine injection Soln 5 mg Q6H PRN    simethicone chewable tablet 80 mg QID PRN    sodium chloride 0.9% 250 mL flush bag 1 time in Clinic/[facility]    sodium chloride 0.9% flush 10 mL PRN    sodium chloride 0.9% flush 5 mL PRN    sodium phosphate 20.01 mmol in dextrose 5 % 250 mL IVPB PRN    sodium phosphate 30 mmol in dextrose 5 % 250 mL IVPB PRN    sodium phosphate 39.99 mmol in dextrose 5 % 250 mL IVPB PRN     Facility-Administered Medications Ordered in Other Encounters   Medication Frequency    celecoxib capsule 400 mg Once    fentaNYL 50 mcg/mL injection  mcg PRN    LIDOcaine (PF) 10 mg/ml (1%) injection 10 mg Once PRN    LIDOcaine (PF) 10 mg/ml (1%) injection 10 mg Once    midazolam (VERSED) 1 mg/mL injection 0.5-4 mg PRN    ropivacaine 0.2% [facility] PainPRO Pump infusion 500 ML Continuous     Objective:     Vital Signs (Most Recent):  Temp: 97.8 °F (36.6 °C) (10/28/22 0456)  Pulse: 71 (10/28/22 0730)  Resp: 15 (10/28/22 0730)  BP: (!) 152/65 (10/28/22 0600)  SpO2: 100 % (10/28/22 0730)  O2 Device (Oxygen Therapy): nasal cannula w/ humidification (10/28/22 0730)   Vital Signs (24h Range):  Temp:  [96 °F (35.6 °C)-98.5 °F (36.9 °C)] 97.8 °F (36.6 °C)  Pulse:  [] 71  Resp:  [11-40] 15  SpO2:  [88 %-100 %] 100 %  BP: ()/(52-75) 152/65     Weight: 63.5 kg (139 lb 15.9 oz) (10/27/22 1334)  Body mass index is 26.45 kg/m².  Body surface area is 1.65 meters squared.      Intake/Output Summary (Last 24 hours) at 10/28/2022 0908  Last data filed at 10/28/2022 0600  Gross per 24 hour   Intake 8340.7 ml   Output 5058 ml   Net 3282.7 ml       Physical Exam   Constitutional: She is oriented to person, place, and time. No distress.   HENT:   Head: Normocephalic and atraumatic.   Eyes: Pupils are equal, round, and reactive to light.   Cardiovascular: Normal rate and regular rhythm.   No murmur heard.  Pulmonary/Chest:  Effort normal. No respiratory distress. She has no wheezes. She has rales.   Abdominal: Soft. Bowel sounds are normal. There is no abdominal tenderness.   Musculoskeletal:         General: No deformity. Normal range of motion.      Cervical back: Normal range of motion.   Neurological: She is alert and oriented to person, place, and time.   Skin: Skin is warm and dry.   Psychiatric: Affect and judgment normal.     Significant Labs:  CBC:   Recent Labs   Lab 10/28/22  0304   WBC 38.44*   HGB 6.4*   HCT 19.2*        CMP:   Recent Labs   Lab 10/28/22  0304   *   CALCIUM 7.4*   ALBUMIN 2.7*   PROT 5.1*      K 4.1   CO2 22*      *   CREATININE 4.2*   ALKPHOS 76   ALT 42   AST 67*   BILITOT 1.1*       Significant Imaging:  Imaging results within the past 24 hours have been reviewed.

## 2022-10-29 PROBLEM — E87.1 HYPONATREMIA: Status: RESOLVED | Noted: 2022-10-20 | Resolved: 2022-10-29

## 2022-10-29 PROBLEM — D62 ACUTE BLOOD LOSS ANEMIA: Status: RESOLVED | Noted: 2022-10-19 | Resolved: 2022-10-29

## 2022-10-29 LAB
ALBUMIN SERPL BCP-MCNC: 2.4 G/DL (ref 3.5–5.2)
ALBUMIN SERPL BCP-MCNC: 2.5 G/DL (ref 3.5–5.2)
ALBUMIN SERPL BCP-MCNC: 2.9 G/DL (ref 3.5–5.2)
ALP SERPL-CCNC: 86 U/L (ref 55–135)
ALT SERPL W/O P-5'-P-CCNC: 52 U/L (ref 10–44)
ANION GAP SERPL CALC-SCNC: 13 MMOL/L (ref 8–16)
ANION GAP SERPL CALC-SCNC: 15 MMOL/L (ref 8–16)
ANION GAP SERPL CALC-SCNC: 18 MMOL/L (ref 8–16)
ANISOCYTOSIS BLD QL SMEAR: SLIGHT
AST SERPL-CCNC: 58 U/L (ref 10–40)
BASOPHILS # BLD AUTO: ABNORMAL K/UL (ref 0–0.2)
BASOPHILS NFR BLD: 0 % (ref 0–1.9)
BILIRUB SERPL-MCNC: 0.8 MG/DL (ref 0.1–1)
BLD PROD TYP BPU: NORMAL
BLOOD UNIT EXPIRATION DATE: NORMAL
BLOOD UNIT TYPE CODE: 1700
BLOOD UNIT TYPE CODE: 1700
BLOOD UNIT TYPE CODE: 7300
BLOOD UNIT TYPE: NORMAL
BUN SERPL-MCNC: 127 MG/DL (ref 8–23)
BUN SERPL-MCNC: 135 MG/DL (ref 8–23)
BUN SERPL-MCNC: 140 MG/DL (ref 8–23)
CALCIUM SERPL-MCNC: 7.3 MG/DL (ref 8.7–10.5)
CALCIUM SERPL-MCNC: 7.9 MG/DL (ref 8.7–10.5)
CALCIUM SERPL-MCNC: 9.1 MG/DL (ref 8.7–10.5)
CHLORIDE SERPL-SCNC: 108 MMOL/L (ref 95–110)
CHLORIDE SERPL-SCNC: 110 MMOL/L (ref 95–110)
CHLORIDE SERPL-SCNC: 111 MMOL/L (ref 95–110)
CO2 SERPL-SCNC: 20 MMOL/L (ref 23–29)
CO2 SERPL-SCNC: 20 MMOL/L (ref 23–29)
CO2 SERPL-SCNC: 22 MMOL/L (ref 23–29)
CODING SYSTEM: NORMAL
CREAT SERPL-MCNC: 4.8 MG/DL (ref 0.5–1.4)
CREAT SERPL-MCNC: 4.9 MG/DL (ref 0.5–1.4)
CREAT SERPL-MCNC: 5.1 MG/DL (ref 0.5–1.4)
DIFFERENTIAL METHOD: ABNORMAL
DISPENSE STATUS: NORMAL
EOSINOPHIL # BLD AUTO: ABNORMAL K/UL (ref 0–0.5)
EOSINOPHIL NFR BLD: 1 % (ref 0–8)
ERYTHROCYTE [DISTWIDTH] IN BLOOD BY AUTOMATED COUNT: 14.6 % (ref 11.5–14.5)
EST. GFR  (NO RACE VARIABLE): 8.3 ML/MIN/1.73 M^2
EST. GFR  (NO RACE VARIABLE): 8.7 ML/MIN/1.73 M^2
EST. GFR  (NO RACE VARIABLE): 8.9 ML/MIN/1.73 M^2
GLUCOSE SERPL-MCNC: 130 MG/DL (ref 70–110)
GLUCOSE SERPL-MCNC: 134 MG/DL (ref 70–110)
GLUCOSE SERPL-MCNC: 180 MG/DL (ref 70–110)
HCT VFR BLD AUTO: 26.5 % (ref 37–48.5)
HGB BLD-MCNC: 8.7 G/DL (ref 12–16)
HYPOCHROMIA BLD QL SMEAR: ABNORMAL
IMM GRANULOCYTES # BLD AUTO: ABNORMAL K/UL (ref 0–0.04)
IMM GRANULOCYTES NFR BLD AUTO: ABNORMAL % (ref 0–0.5)
LYMPHOCYTES # BLD AUTO: ABNORMAL K/UL (ref 1–4.8)
LYMPHOCYTES NFR BLD: 3 % (ref 18–48)
MAGNESIUM SERPL-MCNC: 2.3 MG/DL (ref 1.6–2.6)
MAGNESIUM SERPL-MCNC: 2.4 MG/DL (ref 1.6–2.6)
MAGNESIUM SERPL-MCNC: 2.5 MG/DL (ref 1.6–2.6)
MCH RBC QN AUTO: 28.7 PG (ref 27–31)
MCHC RBC AUTO-ENTMCNC: 32.8 G/DL (ref 32–36)
MCV RBC AUTO: 88 FL (ref 82–98)
METAMYELOCYTES NFR BLD MANUAL: 2 %
MONOCYTES # BLD AUTO: ABNORMAL K/UL (ref 0.3–1)
MONOCYTES NFR BLD: 4 % (ref 4–15)
NEUTROPHILS NFR BLD: 90 % (ref 38–73)
NRBC BLD-RTO: 2 /100 WBC
NUM UNITS TRANS FFP: NORMAL
PHOSPHATE SERPL-MCNC: 5.7 MG/DL (ref 2.7–4.5)
PHOSPHATE SERPL-MCNC: 5.9 MG/DL (ref 2.7–4.5)
PHOSPHATE SERPL-MCNC: 6.6 MG/DL (ref 2.7–4.5)
PLATELET # BLD AUTO: 274 K/UL (ref 150–450)
PLATELET BLD QL SMEAR: ABNORMAL
PMV BLD AUTO: 11.3 FL (ref 9.2–12.9)
POCT GLUCOSE: 149 MG/DL (ref 70–110)
POCT GLUCOSE: 155 MG/DL (ref 70–110)
POCT GLUCOSE: 197 MG/DL (ref 70–110)
POCT GLUCOSE: 207 MG/DL (ref 70–110)
POCT GLUCOSE: 212 MG/DL (ref 70–110)
POLYCHROMASIA BLD QL SMEAR: ABNORMAL
POTASSIUM SERPL-SCNC: 3.5 MMOL/L (ref 3.5–5.1)
POTASSIUM SERPL-SCNC: 4.3 MMOL/L (ref 3.5–5.1)
POTASSIUM SERPL-SCNC: 4.9 MMOL/L (ref 3.5–5.1)
PROT SERPL-MCNC: 5.1 G/DL (ref 6–8.4)
RBC # BLD AUTO: 3.03 M/UL (ref 4–5.4)
SODIUM SERPL-SCNC: 145 MMOL/L (ref 136–145)
SODIUM SERPL-SCNC: 146 MMOL/L (ref 136–145)
SODIUM SERPL-SCNC: 146 MMOL/L (ref 136–145)
TARGETS BLD QL SMEAR: ABNORMAL
WBC # BLD AUTO: 34.44 K/UL (ref 3.9–12.7)

## 2022-10-29 PROCEDURE — 36415 COLL VENOUS BLD VENIPUNCTURE: CPT | Performed by: STUDENT IN AN ORGANIZED HEALTH CARE EDUCATION/TRAINING PROGRAM

## 2022-10-29 PROCEDURE — 99233 PR SUBSEQUENT HOSPITAL CARE,LEVL III: ICD-10-PCS | Mod: GC,,, | Performed by: EMERGENCY MEDICINE

## 2022-10-29 PROCEDURE — 85007 BL SMEAR W/DIFF WBC COUNT: CPT

## 2022-10-29 PROCEDURE — 99233 SBSQ HOSP IP/OBS HIGH 50: CPT | Mod: ,,, | Performed by: INTERNAL MEDICINE

## 2022-10-29 PROCEDURE — 25000003 PHARM REV CODE 250: Performed by: PATHOLOGY

## 2022-10-29 PROCEDURE — 83735 ASSAY OF MAGNESIUM: CPT | Mod: 91

## 2022-10-29 PROCEDURE — 80053 COMPREHEN METABOLIC PANEL: CPT

## 2022-10-29 PROCEDURE — 25000003 PHARM REV CODE 250: Performed by: HOSPITALIST

## 2022-10-29 PROCEDURE — 27201109 HC SYSTEM FECAL MANAGEMENT

## 2022-10-29 PROCEDURE — 25000003 PHARM REV CODE 250: Performed by: STUDENT IN AN ORGANIZED HEALTH CARE EDUCATION/TRAINING PROGRAM

## 2022-10-29 PROCEDURE — 85027 COMPLETE CBC AUTOMATED: CPT

## 2022-10-29 PROCEDURE — 25000003 PHARM REV CODE 250

## 2022-10-29 PROCEDURE — 63600175 PHARM REV CODE 636 W HCPCS: Performed by: PATHOLOGY

## 2022-10-29 PROCEDURE — 20600001 HC STEP DOWN PRIVATE ROOM

## 2022-10-29 PROCEDURE — 94660 CPAP INITIATION&MGMT: CPT

## 2022-10-29 PROCEDURE — 63600175 PHARM REV CODE 636 W HCPCS: Performed by: STUDENT IN AN ORGANIZED HEALTH CARE EDUCATION/TRAINING PROGRAM

## 2022-10-29 PROCEDURE — 99900035 HC TECH TIME PER 15 MIN (STAT)

## 2022-10-29 PROCEDURE — 84100 ASSAY OF PHOSPHORUS: CPT

## 2022-10-29 PROCEDURE — 80069 RENAL FUNCTION PANEL: CPT | Performed by: STUDENT IN AN ORGANIZED HEALTH CARE EDUCATION/TRAINING PROGRAM

## 2022-10-29 PROCEDURE — 94761 N-INVAS EAR/PLS OXIMETRY MLT: CPT

## 2022-10-29 PROCEDURE — 27000221 HC OXYGEN, UP TO 24 HOURS

## 2022-10-29 PROCEDURE — P9017 PLASMA 1 DONOR FRZ W/IN 8 HR: HCPCS | Performed by: PATHOLOGY

## 2022-10-29 PROCEDURE — 99233 SBSQ HOSP IP/OBS HIGH 50: CPT | Mod: GC,,, | Performed by: EMERGENCY MEDICINE

## 2022-10-29 PROCEDURE — 36514 APHERESIS PLASMA: CPT

## 2022-10-29 PROCEDURE — C9113 INJ PANTOPRAZOLE SODIUM, VIA: HCPCS | Performed by: STUDENT IN AN ORGANIZED HEALTH CARE EDUCATION/TRAINING PROGRAM

## 2022-10-29 PROCEDURE — 83735 ASSAY OF MAGNESIUM: CPT | Performed by: STUDENT IN AN ORGANIZED HEALTH CARE EDUCATION/TRAINING PROGRAM

## 2022-10-29 PROCEDURE — 99233 PR SUBSEQUENT HOSPITAL CARE,LEVL III: ICD-10-PCS | Mod: ,,, | Performed by: INTERNAL MEDICINE

## 2022-10-29 RX ORDER — AMLODIPINE BESYLATE 10 MG/1
10 TABLET ORAL DAILY
Status: DISCONTINUED | OUTPATIENT
Start: 2022-10-29 | End: 2022-12-03

## 2022-10-29 RX ORDER — SODIUM CHLORIDE 9 MG/ML
INJECTION, SOLUTION INTRAVENOUS
Status: CANCELLED | OUTPATIENT
Start: 2022-10-29

## 2022-10-29 RX ORDER — LISINOPRIL 20 MG/1
40 TABLET ORAL DAILY
Status: DISCONTINUED | OUTPATIENT
Start: 2022-10-29 | End: 2022-11-21

## 2022-10-29 RX ORDER — SODIUM CHLORIDE 9 MG/ML
INJECTION, SOLUTION INTRAVENOUS ONCE
Status: CANCELLED | OUTPATIENT
Start: 2022-10-29 | End: 2022-10-29

## 2022-10-29 RX ADMIN — PANTOPRAZOLE SODIUM 40 MG: 40 INJECTION, POWDER, FOR SOLUTION INTRAVENOUS at 08:10

## 2022-10-29 RX ADMIN — INSULIN ASPART 2 UNITS: 100 INJECTION, SOLUTION INTRAVENOUS; SUBCUTANEOUS at 06:10

## 2022-10-29 RX ADMIN — CALCIUM GLUCONATE 1 G: 98 INJECTION, SOLUTION INTRAVENOUS at 08:10

## 2022-10-29 RX ADMIN — HEPARIN SODIUM 3200 UNITS: 1000 INJECTION, SOLUTION INTRAVENOUS; SUBCUTANEOUS at 02:10

## 2022-10-29 RX ADMIN — CLONIDINE HYDROCHLORIDE 0.2 MG: 0.1 TABLET ORAL at 08:10

## 2022-10-29 RX ADMIN — CLONIDINE HYDROCHLORIDE 0.2 MG: 0.1 TABLET ORAL at 01:10

## 2022-10-29 RX ADMIN — INSULIN ASPART 2 UNITS: 100 INJECTION, SOLUTION INTRAVENOUS; SUBCUTANEOUS at 12:10

## 2022-10-29 RX ADMIN — LEVOTHYROXINE SODIUM 25 MCG: 25 TABLET ORAL at 05:10

## 2022-10-29 RX ADMIN — DIPHENHYDRAMINE HYDROCHLORIDE 25 MG: 50 INJECTION, SOLUTION INTRAMUSCULAR; INTRAVENOUS at 02:10

## 2022-10-29 RX ADMIN — METHYLPREDNISOLONE SODIUM SUCCINATE 50 MG: 40 INJECTION, POWDER, FOR SOLUTION INTRAMUSCULAR; INTRAVENOUS at 08:10

## 2022-10-29 RX ADMIN — ATOVAQUONE 1500 MG: 750 SUSPENSION ORAL at 08:10

## 2022-10-29 RX ADMIN — QUETIAPINE FUMARATE 25 MG: 25 TABLET ORAL at 08:10

## 2022-10-29 RX ADMIN — LISINOPRIL 40 MG: 20 TABLET ORAL at 10:10

## 2022-10-29 RX ADMIN — INSULIN ASPART 4 UNITS: 100 INJECTION, SOLUTION INTRAVENOUS; SUBCUTANEOUS at 06:10

## 2022-10-29 RX ADMIN — AMLODIPINE BESYLATE 10 MG: 10 TABLET ORAL at 10:10

## 2022-10-29 NOTE — NURSING TRANSFER
Nursing Transfer Note      10/29/2022     Reason patient is being transferred: step down    Transfer To: [id number] from [id number]    Transfer via bed    Transfer with 2L to O2, cardiac monitoring    Transported by CRISTINE Orozco and LEONEL Stein    Medicines sent: insulin pen, tube feeds, maalox    Any special needs or follow-up needed: szymanski removed prior to SD at 1200, ensure patient voids within 6 hrs    Chart send with patient: Yes    Notified: daughter    Patient reassessed at: 10/29 @ 1230     Upon arrival to floor: cardiac monitor applied, patient oriented to room, call bell in reach, and bed in lowest position, bedside nurse at the patients bedside

## 2022-10-29 NOTE — PROGRESS NOTES
[facility] - Cardiac Medical ICU  Critical Care Medicine  Progress Note    Patient Name: Kristin Goodman  MRN: [id number]  Admission Date: 10/17/2022  Hospital Length of Stay: 12 days  Code Status: Full Code  Attending Provider: Fox Holbrook MD  Primary Care Provider: Lori Hernandez MD   Principal Problem: Microscopic polyangiitis    Subjective:     HPI:  Ms. Goodman is a 77 year old female with PMH notable for HTN, hypothyroidism, HFpEF (65%, TR, elevated PA pressure), and osteoarthritis of the arm who initially presented to [facility] [place]17 with progressive shortness of breath, weakness, cough, and hemoptysis. Previously, she presented the ED 9/24 with hemoptysis and CT and CXR at that time concerning for PNA which she was given a 10 day course of abx and albuterol. She followed up with her PCP and had improvement of symptoms. She then presented to the ED 10/14 with no interventions. At the time of this presentation, 10/17, in addition to above symptoms, she reports fever up to 102.4 and increasing amounts of hemoptysis. CT chest with extensive opacification at admission. She was started on broad spectrum IV abx coverage with Vanc / Zosyn and an infectious work up was sent. ID was later consulted during the hospitalization.     Her hospitalization has been complicated by GIB which required 2 u PRBC transfusion and PPI therapy. No EGD with GI. Additionally had bilateral cervical adenopathy which was evaluated by ENT and felt to be reactive in nature. She has had worsening renal function and nephrology was consulted with plans for a kidney biopsy. She has been trailed with IV diuresis. Nephrology concerned GN. Rheumatologic work up in process.     Critical care consulted on the evening of 10/23 for worsening respiratory failure now requiring NIPPV. Earlier today on 4L nasal cannula then transitioned to venti mask and later BiPap. Worsening opacification on CXR and AMS. Patient upgraded to MICU for higher level of  care and closer monitoring in setting of her tenuous respiratory status.       Hospital/ICU Course:  Pt transferred to ICU 10/23/22 2/2 respiratory distress in the setting of hemoptysis. Decision made to consult IR and rheumatology. High-dose solumedrol x 3 days followed by predinitiated. Complete rheumatologic work-up initiated. IR consulted for renal biopsy, planned for 10/25 but postponed. Worsening mental and respiratory status 10/25. Trialysis catheter placed overnight 10/25 with plans for plasmapheresis overnight. Pt tolerated procedure well despite multiple attempts to place line. Went for IR Renal biopsy AM of 10/26. Tolerated procedure well. Endorsing burning at szymanski insertion site. Continuing Azithromycin and Cefepime. Unable to start PJP prophylaxis due to sulfa allergy and inability to tolerate PO meds at this point due to worsening encephalopathy and mild sedation. Rheumatology planning to start rituximab and cyclophosphamide. 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 NG tube placed 10/27. Pt now receiving tube feeds and atovaquone via NG tube. Now getting plasma exchange, dialysis, steroids, rituximab, and cyclophosphamide. Nephro plans to continue HD. Tapering her steroids per rheum recs. Doing well on 2L NC. Stable for step down.       Interval History/Significant Events: NAEON, pt more awake and alert. Appropriately responding to questions. VSS, although pt hypertensive. Resuming home medications.      Review of Systems   Unable to perform ROS: Mental status change   Genitourinary:  Positive for dysuria.   Psychiatric/Behavioral:  Positive for confusion.    Objective:     Vital Signs (Most Recent):  Temp: 98.4 °F (36.9 °C) (10/29/22 0815)  Pulse: 75 (10/29/22 1000)  Resp: (!) 29 (10/29/22 1000)  BP: (!) 142/65 (10/29/22 1000)  SpO2: 98 % (10/29/22 1000)   Vital Signs (24h  Range):  Temp:  [97.7 °F (36.5 °C)-98.6 °F (37 °C)] 98.4 °F (36.9 °C)  Pulse:  [] 75  Resp:  [17-41] 29  SpO2:  [95 %-99 %] 98 %  BP: (142-189)/(65-83) 142/65   Weight: 63.5 kg (139 lb 15.9 oz)  Body mass index is 26.45 kg/m².      Intake/Output Summary (Last 24 hours) at 10/29/2022 1137  Last data filed at 10/29/2022 1105  Gross per 24 hour   Intake 750 ml   Output 1175 ml   Net -425 ml       Physical Exam  Vitals and nursing note reviewed.   Constitutional:       General: She is not in acute distress.     Appearance: She is well-developed. She is ill-appearing. She is not toxic-appearing.      Comments: Patient somnolent, moans intermittently    HENT:      Head: Normocephalic and atraumatic.      Mouth/Throat:      Mouth: Mucous membranes are dry.   Eyes:      Conjunctiva/sclera: Conjunctivae normal.   Cardiovascular:      Rate and Rhythm: Normal rate and regular rhythm.      Heart sounds: Normal heart sounds.   Pulmonary:      Effort: Pulmonary effort is normal. No respiratory distress.      Breath sounds: No wheezing or rales.   Abdominal:      General: Bowel sounds are normal. There is no distension.      Palpations: Abdomen is soft.      Tenderness: There is no abdominal tenderness.   Musculoskeletal:         General: No tenderness. Normal range of motion.      Cervical back: Normal range of motion and neck supple.      Right lower leg: No edema.      Left lower leg: No edema.   Lymphadenopathy:      Cervical: No cervical adenopathy.   Skin:     General: Skin is warm and dry.      Capillary Refill: Capillary refill takes less than 2 seconds.      Findings: No rash.   Neurological:      General: No focal deficit present.      Mental Status: She is oriented to person, place, and time.   Psychiatric:         Mood and Affect: Mood normal.      Comments: Patient no longer sedated       Vents:  Oxygen Concentration (%): 50 (10/27/22 0505)  Lines/Drains/Airways       Central Venous Catheter Line  Duration              Trialysis (Dialysis) Catheter 10/25/22 2329 left internal jugular 3 days              Drain  Duration                  Urethral Catheter 10/23/22 2230 16 Fr. 5 days         NG/OG Tube 10/27/22 1300 nasogastric 14 Fr. Right nostril 1 day         Fecal Incontinence  10/28/22 1901 <1 day              Peripheral Intravenous Line  Duration                  Midline Catheter Insertion/Assessment  - Single Lumen 10/18/22 1831 Left brachial vein 18g x 10cm 10 days                  Significant Labs:    CBC/Anemia Profile:  Recent Labs   Lab 10/28/22  0304 10/28/22  0955 10/29/22  0310   WBC 38.44* 36.75* 34.44*   HGB 6.4* 9.4* 8.7*   HCT 19.2* 28.0* 26.5*    253 274   MCV 84 86 88   RDW 14.3 14.0 14.6*        Chemistries:  Recent Labs   Lab 10/28/22  0304 10/28/22  1717 10/29/22  0310    144 145   K 4.1 3.9 3.5    108 110   CO2 22* 23 20*   * 116* 127*   CREATININE 4.2* 4.6* 4.8*   CALCIUM 7.4* 7.6* 7.3*   ALBUMIN 2.7* 2.8* 2.4*   PROT 5.1*  --  5.1*   BILITOT 1.1*  --  0.8   ALKPHOS 76  --  86   ALT 42  --  52*   AST 67*  --  58*   MG 2.3 2.4 2.4   PHOS 5.8* 5.9* 5.7*       CMP:   Recent Labs   Lab 10/28/22  0304 10/28/22  1717 10/29/22  0310    144 145   K 4.1 3.9 3.5    108 110   CO2 22* 23 20*   * 172* 134*   * 116* 127*   CREATININE 4.2* 4.6* 4.8*   CALCIUM 7.4* 7.6* 7.3*   PROT 5.1*  --  5.1*   ALBUMIN 2.7* 2.8* 2.4*   BILITOT 1.1*  --  0.8   ALKPHOS 76  --  86   AST 67*  --  58*   ALT 42  --  52*   ANIONGAP 12 13 15       All pertinent labs within the past 24 hours have been reviewed.    Significant Imaging:  I have reviewed all pertinent imaging results/findings within the past 24 hours.      ABG  Recent Labs   Lab 10/26/22  1201   PH 7.263*   PO2 39*   PCO2 40.9   HCO3 18.5*   BE -9     Assessment/Plan:     Pulmonary  Acute hypoxemic respiratory failure  Microscopic polyangitis  Multifocal pneumonia  Hemoptysis    78 yo PMHx HTN, hypothyroid, HFpEF,  OA admitted for fevers and respiratory symptoms, treated while on Hospital Medicine for multifocal PNA. Course complicated by GIB bleed (no EGD yet d/t PNA), hyponatremia, ATN. MICU consulted for rapidly increasing oxygen requirement (4L NC to BiPAP 15/10 50%), respiratory distress/work of breathing, somnolence. Nephrology consulted for ATN, concerns for possible nephritic disease as  behind ATN d/t acanthrocytes. Reports of scant hemoptysis by nursing staff 10/21, 10/22.    Imaging: CXR: Diffuse bilateral airspace infiltrates w/ c/f alveolar fillings; CT chest wc 10/17 with bl perihilar dense consolidations w/ peripheral patcy areas of GGO, BL PNA, less c/f cardiogenic pulmonary edema.  Labs: WBC uptrending 28.97, Hgb lowest 6.0 (s/p 2 u PRBC), continues to downtrend approx 1 point / day. sCr uptrending, (baseline 1.0-1.2). .      No results for input(s): PH, PCO2, PO2, HCO3, POCSATURATED, BE in the last 72 hours.  Etiology: Microscopic polyangitis likely. Concerns for possible PE as patient was not on thrombotic ppx s/o GIB. Incomplete I/Os charted, though reports of low UOP also renders pulmonary congestion edema as a possibility. Less c/f acute progression of multifocal PNA as adequate ABx coverage and no new fevers recently.    -- US DVT BLE unremarkable  -- Consideration for urgent BAL +/- repeat chest imaging (last done 10/17)  -- IV solumedrol   -- PJP prophylaxis  -- Rheumatology consulted: Rituximab, cyclophosphamide, and steroids per their recs.  -- Per nephrology consulted, monitoring for HD needs  -- Plasmapheresis started 10/26, plans for 7 more cycles over 14 days.  -- Wean supplemental O2 as tolerated  -- Neuro checks  -- supplemental O2 prn    Multifocal pneumonia  See acute hypoxemic respiratory failure    Cardiac/Vascular  Chronic diastolic heart failure  Pulmonary Hypertension     TTE (10/2022) EF 65%, G1DD  Home meds: Lisinopril, Toprol  Dry weight: N/A     -- volume control plan  per Acute Hypoxemic Respiratory Failure A/P  -- Daily weights (standing if tolerated)  -- Strict I/Os; goal net negative 1.5 - 2 L / day  -- Cardiac diet w/ 2 g Na restriction      HTN (hypertension)  -- Continue home medications as tolerated  -- pt not tolerating PO medications, prn hydralazine ordered    Renal/  Acute renal failure  See microscopic polyangitis    Acute renal failure superimposed on stage 3 chronic kidney disease  See microscopic polyangitis  Patient w/o CKD presenting with WILFREDO with rapidly increasing sCr (baseline sCr 1.0-1.2). New onset proteinuria/hematuria in last 30 days. .  DDx: ATN vs GN.     Serum creatinine: 4.8 mg/dL (H) 10/29/22 0310  Estimated creatinine clearance: 8.6 mL/min (A)     -- Renal Bx done, f/u pathology  -- high-dose steroids completed, tapering per rheumatology recs.  -- Rituximab and cytoxan per rheum recs.  -- Undergoing PLEX, see rheum note  -- Trend sCr  -- Strict I&Os  -- Avoid nephrotoxic agents  -- Renally adjust medications  -- Nephro and Rheumatology following      CKD (chronic kidney disease), stage III  See microscopic polyangitis    ID  Septic shock  See acute hypoxemic respiratory failure    Immunology/Multi System  * Microscopic polyangiitis  As of 10/26/22 strongly positive MPO Ab (in contrast to borderline positive PR3), consistent with clinical picture of microscopic polyangiitis with pulmonary, and renal involvement. Trialysis catheter placed overnight with plans for plasmapheresis early this AM. Pt tolerated procedure well despite multiple attempts to place line. Went for IR Renal biopsy this AM. Tolerated procedure well. Endorsing burning at szymanski insertion site. Continuing Azithromycin and Cefepime. Rheumatology planning to start rituximab and cyclophosphamide.     - Started Plasmapheresis w/ plans for 7 treatments over 14-day period (Schedule: Wed, Thurs, Fri, Sun, Tues, Wed, Fri)  - rituximab and cyclophosphamide initiated 10/28. Next  Rituximab 275 mg/m^2 due on November 3. Next cyclophosphamide 7.5 mg/kg due on November 10   - PJP prophylaxis with atovaquone via NG until able to tolerate PO.  - nephrology consulted  - rheumatology consulted  - s/p 7 days of high dose steroids, now tapering per rheumatology's recommendations: IV solumedrol 24mg daily  - continuing protonix daily        Endocrine  Moderate malnutrition  Nutrition consulted. Most recent weight and BMI monitored-          Malnutrition (Moderate to Severe)  Weight Loss (Malnutrition): 7.5% in 3 months              Measurements:  Wt Readings from Last 1 Encounters:   10/27/22 63.5 kg (139 lb 15.9 oz)   Body mass index is 26.45 kg/m².    Recommendations: Recommendation/Intervention: 1. Pending NGT placement. When able, initiate TFs. Rec'd Novasource @ 35 mL/hr to provide 1680 kcals, 76 g of protein, 602 mL fluid. 2. RD to monitor & follow-up.  Goals: Meet % EEN, EPN by RD f/u date    Patient has been screened and assessed by RD. RD will follow patient.      Hypothyroid  - Continue home medications as tolerated  - rechecking TSH today, unclear whether patient was taking this medication at home        Critical Care Daily Checklist:     A: Awake: RASS Goal/Actual Goal:    Actual: [place] Agitation Sedation Scale (RASS): Drowsy   B: Spontaneous Breathing Trial Performed?  na   C: SAT & SBT Coordinated?  NA                      D: Delirium: CAM-ICU Overall CAM-ICU: Positive   E: Early Mobility Performed? No   F: Feeding Goal:    Status:           Current Diet Order   Procedures    Diet renal       AS: Analgesia/Sedation none   T: Thromboembolic Prophylaxis holding   H: HOB > 300 Yes   U: Stress Ulcer Prophylaxis (if needed) heparin   G: Glucose Control monitoring   B: Bowel Function Stool Occurrence: 1   I: Indwelling Catheter (Lines & Curry) Necessity LIJ trialysis x3 day  Midline x10 days  Urethral catheter-removed      D: De-escalation of Antimicrobials/Pharmacotherapies  atovaquone     Plan for the day/ETD NG/OG, improve nutritional status;      Code Status:  Family/Goals of Care: Full Code  Discussed with family at bedside.         Critical secondary to Patient has a condition that poses threat to life and bodily function: Acute Renal Failure and Respiratory Failure 2/2 microscopic polyangitis.            Critical care was time spent personally by me on the following activities: development of treatment plan with patient or surrogate and bedside caregivers, discussions with consultants, evaluation of patient's response to treatment, examination of patient, ordering and performing treatments and interventions, ordering and review of laboratory studies, ordering and review of radiographic studies, pulse oximetry, re-evaluation of patient's condition. This critical care time did not overlap with that of any other provider or involve time for any procedures.     Magui Cage MD  Critical Care Medicine  [facility] - Cardiac Medical ICU

## 2022-10-29 NOTE — ASSESSMENT & PLAN NOTE
See microscopic polyangitis  Patient w/o CKD presenting with WILFREDO with rapidly increasing sCr (baseline sCr 1.0-1.2). New onset proteinuria/hematuria in last 30 days. .  DDx: ATN vs GN.     Serum creatinine: 4.8 mg/dL (H) 10/29/22 0310  Estimated creatinine clearance: 8.6 mL/min (A)     -- Renal Bx done, f/u pathology  -- high-dose steroids completed, tapering per rheumatology recs.  -- Rituximab and cytoxan per rheum recs.  -- Undergoing PLEX, see rheum note  -- Trend sCr  -- Strict I&Os  -- Avoid nephrotoxic agents  -- Renally adjust medications  -- Nephro and Rheumatology following

## 2022-10-29 NOTE — ASSESSMENT & PLAN NOTE
As of 10/26/22 strongly positive MPO Ab (in contrast to borderline positive PR3), consistent with clinical picture of microscopic polyangiitis with pulmonary, and renal involvement. Trialysis catheter placed overnight with plans for plasmapheresis early this AM. Pt tolerated procedure well despite multiple attempts to place line. Went for IR Renal biopsy this AM. Tolerated procedure well. Endorsing burning at szymanski insertion site. Continuing Azithromycin and Cefepime. Rheumatology planning to start rituximab and cyclophosphamide.     - Started Plasmapheresis w/ plans for 7 treatments over 14-day period (Schedule: Wed, Thurs, Fri, Sun, Tues, Wed, Fri)  - rituximab and cyclophosphamide initiated 10/28. Next Rituximab 275 mg/m^2 due on November 3. Next cyclophosphamide 7.5 mg/kg due on November 10   - PJP prophylaxis with atovaquone via NG until able to tolerate PO.  - nephrology consulted  - rheumatology consulted  - s/p 7 days of high dose steroids, now tapering per rheumatology's recommendations: IV solumedrol 24mg daily  - continuing protonix daily

## 2022-10-29 NOTE — ASSESSMENT & PLAN NOTE
Patient with baseline creatinine of 1 as recent as August 2022 with progressive worsening beginning in early October 1.3 (10/13)->1.6 (10/17)->3.0 (10/23) despite IV fluids.  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  Patient noted to have new onset proteinuria and hematuria over the last 1 month.  Additionally, would consider ATN in the setting of suspected sepsis from multifocal pneumonia.     Concerns for glomerulonephritis given patient's current clinical picture. Initial urine microscopy demonstrating muddy brown casts with likely acanthocytes noted as well. MITUL positive, proteinase 3 ab weakly positive, MPO strongly positive. Patient s/p high-dose IV solumedrol x3 doses, now on Solumedrol 50mg qd. Underwent kidney bx 10/27. Rheumatology consulted, and patient started on Plex, Ritux/cytoxan. Patient has received 2 rounds of Plex, as well as 1 dose of Ritux and cytoxan on 10/27. Given continually worsening renal function and uremic encephalopathy, patient underwent SLED without complication on 10/27. Transferred to floor on 10/29. Planned for Plex #3 today.       Recommendations:  - plan for iHD today   - plex #3 today per rheum  - f/u kidney bx results   - continue plex, Ritux/cytoxan per rheum (next dose of ritux and cytoxan on 11/3)  - continue steroid taper  - strict intake and output  - renally dose medications and avoid nephrotoxins when possible  - replete electrolytes as appropriate

## 2022-10-29 NOTE — PLAN OF CARE
Plan of care reviewed with pts daughter. Pt aaox1, to self only. Apheresis done at the bedside. Pt will go to HD later tonight. Tube feed running at goal. Flexy seal in place. VSS.   Pt remained free of falls, trauma, and injury. Will continue to monitor.

## 2022-10-29 NOTE — PROGRESS NOTES
[facility] ([place] [address])  Nephrology  Progress Note    Patient Name: Kristin Goodman  MRN: [id number]  Admission Date: 10/17/2022  Hospital Length of Stay: 12 days  Attending Provider: Ethan De Jesus MD   Primary Care Physician: Lori Hernandez MD  Principal Problem:Microscopic polyangiitis    Subjective:     HPI: Kristin Goodman is a 77 year old female with hypertension, hypothyroidism, HFpEF who presents for cough, shortness of breath, and weakness.  Patient initially presented to ER on 09/24 with hemoptysis and imaging concerning for multilobar pneumonia at which time she was discharged on a 10 day course of levofloxacin.  Patient initially had some improvement but began having worsening symptoms on 10/14.  Patient describes multiple fevers and progressive shortness of breath.  She continues to have intermittent hemoptysis.  Patient with worsening leukocytosis and limited clinical improvement despite broad-spectrum antibiotics.  She remains on cefepime.  Patient is a noted to have baseline creatinine of 1 as recent as 8/2022 but progressive worsening of creatinine in early October.  On admission patient with creatinine of 1.6 (10/17) with subsequent progression and creatinine of 3.0 at time of consultation (10/23).  Nephrology consulted for acute kidney injury.      Interval History: NAEON. Patient on 2LNC this morning, less agitated though still somnolent. Stepping down to the floor today. Will plan for HD today.     Review of patient's allergies indicates:   Allergen Reactions    Ampicillin     Peaches [peach (prunus persica)] Other (See Comments)     Pt unable to state type of reaction. Information obtained from daughter who states she was informed of allergy from patient.    Penicillins      Other reaction(s): Hives    Sulfa (sulfonamide antibiotics) Rash and Hives     Current Facility-Administered Medications   Medication Frequency    0.9%  NaCl infusion (for blood administration) Q24H  PRN    0.9%  NaCl infusion (for blood administration) Q24H PRN    acetaminophen tablet 650 mg Q4H PRN    [START ON 10/30/2022] albumin human 5% bottle 50 g Once    albuterol-ipratropium 2.5 mg-0.5 mg/3 mL nebulizer solution 3 mL Q4H PRN    aluminum-magnesium hydroxide-simethicone 200-200-20 mg/5 mL suspension 30 mL QID PRN    amLODIPine tablet 10 mg Daily    atovaquone 750 mg/5 mL oral liquid 1,500 mg Daily    bisacodyL suppository 10 mg Daily PRN    calcium gluconate 1 g in sodium chloride 0.9% 100 mL IVPB Once    [START ON 10/30/2022] calcium gluconate 1 g in sodium chloride 0.9% 100 mL IVPB Once    cloNIDine tablet 0.2 mg Q4H PRN    dextrose 10% bolus 125 mL PRN    dextrose 10% bolus 250 mL PRN    diphenhydrAMINE injection 25 mg Once    [START ON 10/30/2022] diphenhydrAMINE injection 25 mg Once    glucagon (human recombinant) injection 1 mg PRN    glucose chewable tablet 16 g PRN    glucose chewable tablet 24 g PRN    heparin (porcine) injection 3,200 Units Once    [START ON 10/30/2022] heparin (porcine) injection 3,200 Units Once    hydrALAZINE injection 10 mg Q8H PRN    insulin aspart U-100 pen 1-10 Units Q6H PRN    levothyroxine tablet 25 mcg Before breakfast    lisinopriL tablet 40 mg Daily    melatonin tablet 6 mg Nightly PRN    methocarbamoL tablet 500 mg TID PRN    [START ON 10/30/2022] methylPREDNISolone sodium succinate injection 24 mg Daily    naloxone 0.4 mg/mL injection 0.02 mg PRN    ondansetron disintegrating tablet 8 mg Q8H PRN    pantoprazole injection 40 mg Daily    prochlorperazine injection Soln 5 mg Q6H PRN    QUEtiapine tablet 25 mg QHS    simethicone chewable tablet 80 mg QID PRN    sodium chloride 0.9% 250 mL flush bag 1 time in Clinic/HOD    sodium chloride 0.9% flush 10 mL PRN    sodium chloride 0.9% flush 5 mL PRN     Facility-Administered Medications Ordered in Other Encounters   Medication Frequency    celecoxib capsule 400 mg Once    fentaNYL 50  mcg/mL injection  mcg PRN    LIDOcaine (PF) 10 mg/ml (1%) injection 10 mg Once PRN    LIDOcaine (PF) 10 mg/ml (1%) injection 10 mg Once    midazolam (VERSED) 1 mg/mL injection 0.5-4 mg PRN    ropivacaine 0.2% [facility] PainPRO Pump infusion 500 ML Continuous       Objective:     Vital Signs (Most Recent):  Temp: 98.4 °F (36.9 °C) (10/29/22 0815)  Pulse: 75 (10/29/22 1000)  Resp: (!) 29 (10/29/22 1000)  BP: (!) 142/65 (10/29/22 1000)  SpO2: 98 % (10/29/22 1000)  O2 Device (Oxygen Therapy): nasal cannula w/ humidification (10/29/22 0705)   Vital Signs (24h Range):  Temp:  [97.7 °F (36.5 °C)-98.6 °F (37 °C)] 98.4 °F (36.9 °C)  Pulse:  [] 75  Resp:  [17-41] 29  SpO2:  [95 %-99 %] 98 %  BP: (142-189)/(65-83) 142/65     Weight: 63.5 kg (139 lb 15.9 oz) (10/27/22 1334)  Body mass index is 26.45 kg/m².  Body surface area is 1.65 meters squared.    I/O last 3 completed shifts:  In: 3084.7 [I.V.:360.9; Blood:400.4; NG/GT:1135; IV Piggyback:1188.4]  Out: 1732 [Urine:1250; Other:357; Stool:125]    Physical Exam  Vitals and nursing note reviewed.   Constitutional:       General: She is not in acute distress.     Appearance: She is well-developed. She is ill-appearing and toxic-appearing.      Comments: Patient somnolent   HENT:      Head: Normocephalic and atraumatic.      Mouth/Throat:      Mouth: Mucous membranes are dry.   Eyes:      Conjunctiva/sclera: Conjunctivae normal.   Cardiovascular:      Rate and Rhythm: Normal rate and regular rhythm.      Heart sounds: Normal heart sounds.   Pulmonary:      Effort: Pulmonary effort is normal. No respiratory distress.      Breath sounds: No wheezing or rales.      Comments: Coarse breath sounds bilaterally  Abdominal:      General: Bowel sounds are normal. There is no distension.      Palpations: Abdomen is soft.      Tenderness: There is no abdominal tenderness.   Musculoskeletal:         General: No tenderness. Normal range of motion.      Cervical back: Normal range  of motion and neck supple.      Right lower leg: No edema.      Left lower leg: No edema.   Lymphadenopathy:      Cervical: No cervical adenopathy.   Skin:     General: Skin is warm and dry.      Capillary Refill: Capillary refill takes less than 2 seconds.      Findings: No rash.   Neurological:      General: No focal deficit present.      Mental Status: She is oriented to person, place, and time.   Psychiatric:      Comments: Patient arouses to voice and answers questions, though difficult to understand.        Significant Labs:  CBC:   Recent Labs   Lab 10/29/22  0310   WBC 34.44*   RBC 3.03*   HGB 8.7*   HCT 26.5*      MCV 88   MCH 28.7   MCHC 32.8     CMP:   Recent Labs   Lab 10/29/22  0310   *   CALCIUM 7.3*   ALBUMIN 2.4*   PROT 5.1*      K 3.5   CO2 20*      *   CREATININE 4.8*   ALKPHOS 86   ALT 52*   AST 58*   BILITOT 0.8     All labs within the past 24 hours have been reviewed.     Significant Imaging:       Assessment/Plan:     Acute renal failure superimposed on stage 3 chronic kidney disease  Patient with baseline creatinine of 1 as recent as August 2022 with progressive worsening beginning in early October 1.3 (10/13)->1.6 (10/17)->3.0 (10/23) despite IV fluids.  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  Patient noted to have new onset proteinuria and hematuria over the last 1 month.  Additionally, would consider ATN in the setting of suspected sepsis from multifocal pneumonia.     Concerns for glomerulonephritis given patient's current clinical picture. Initial urine microscopy demonstrating muddy brown casts with likely acanthocytes noted as well. MITUL positive, proteinase 3 ab weakly positive, MPO strongly positive. Patient s/p high-dose IV solumedrol x3 doses, now on Solumedrol 50mg qd. Underwent kidney bx 10/27. Rheumatology consulted, and patient started on Plex, Ritux/cytoxan. Patient has  received 2 rounds of Plex, as well as 1 dose of Ritux and cytoxan on 10/27. Given continually worsening renal function and uremic encephalopathy, patient underwent SLED without complication on 10/27. Transferred to floor on 10/29. Planned for Plex #3 today.       Recommendations:  - plan for iHD today   - plex #3 today per rheum  - f/u kidney bx results   - continue plex, Ritux/cytoxan per rheum (next dose of ritux and cytoxan on 11/3)  - continue steroid taper  - strict intake and output  - renally dose medications and avoid nephrotoxins when possible  - replete electrolytes as appropriate        Thank you for your consult. I will follow-up with patient. Please contact us if you have any additional questions.    Bipin Frias MD  Nephrology  [facility] ([place]

## 2022-10-29 NOTE — SUBJECTIVE & OBJECTIVE
Interval History: NAEON. Patient on 2LNC this morning, less agitated though still somnolent. Stepping down to the floor today. Will plan for HD today.     Review of patient's allergies indicates:   Allergen Reactions    Ampicillin     Peaches [peach (prunus persica)] Other (See Comments)     Pt unable to state type of reaction. Information obtained from daughter who states she was informed of allergy from patient.    Penicillins      Other reaction(s): Hives    Sulfa (sulfonamide antibiotics) Rash and Hives     Current Facility-Administered Medications   Medication Frequency    0.9%  NaCl infusion (for blood administration) Q24H PRN    0.9%  NaCl infusion (for blood administration) Q24H PRN    acetaminophen tablet 650 mg Q4H PRN    [START ON 10/30/2022] albumin human 5% bottle 50 g Once    albuterol-ipratropium 2.5 mg-0.5 mg/3 mL nebulizer solution 3 mL Q4H PRN    aluminum-magnesium hydroxide-simethicone 200-200-20 mg/5 mL suspension 30 mL QID PRN    amLODIPine tablet 10 mg Daily    atovaquone 750 mg/5 mL oral liquid 1,500 mg Daily    bisacodyL suppository 10 mg Daily PRN    calcium gluconate 1 g in sodium chloride 0.9% 100 mL IVPB Once    [START ON 10/30/2022] calcium gluconate 1 g in sodium chloride 0.9% 100 mL IVPB Once    cloNIDine tablet 0.2 mg Q4H PRN    dextrose 10% bolus 125 mL PRN    dextrose 10% bolus 250 mL PRN    diphenhydrAMINE injection 25 mg Once    [START ON 10/30/2022] diphenhydrAMINE injection 25 mg Once    glucagon (human recombinant) injection 1 mg PRN    glucose chewable tablet 16 g PRN    glucose chewable tablet 24 g PRN    heparin (porcine) injection 3,200 Units Once    [START ON 10/30/2022] heparin (porcine) injection 3,200 Units Once    hydrALAZINE injection 10 mg Q8H PRN    insulin aspart U-100 pen 1-10 Units Q6H PRN    levothyroxine tablet 25 mcg Before breakfast    lisinopriL tablet 40 mg Daily    melatonin tablet 6 mg Nightly PRN    methocarbamoL tablet 500 mg TID PRN    [START ON  10/30/2022] methylPREDNISolone sodium succinate injection 24 mg Daily    naloxone 0.4 mg/mL injection 0.02 mg PRN    ondansetron disintegrating tablet 8 mg Q8H PRN    pantoprazole injection 40 mg Daily    prochlorperazine injection Soln 5 mg Q6H PRN    QUEtiapine tablet 25 mg QHS    simethicone chewable tablet 80 mg QID PRN    sodium chloride 0.9% 250 mL flush bag 1 time in Clinic/HOD    sodium chloride 0.9% flush 10 mL PRN    sodium chloride 0.9% flush 5 mL PRN     Facility-Administered Medications Ordered in Other Encounters   Medication Frequency    celecoxib capsule 400 mg Once    fentaNYL 50 mcg/mL injection  mcg PRN    LIDOcaine (PF) 10 mg/ml (1%) injection 10 mg Once PRN    LIDOcaine (PF) 10 mg/ml (1%) injection 10 mg Once    midazolam (VERSED) 1 mg/mL injection 0.5-4 mg PRN    ropivacaine 0.2% [facility] PainPRO Pump infusion 500 ML Continuous       Objective:     Vital Signs (Most Recent):  Temp: 98.4 °F (36.9 °C) (10/29/22 0815)  Pulse: 75 (10/29/22 1000)  Resp: (!) 29 (10/29/22 1000)  BP: (!) 142/65 (10/29/22 1000)  SpO2: 98 % (10/29/22 1000)  O2 Device (Oxygen Therapy): nasal cannula w/ humidification (10/29/22 0705)   Vital Signs (24h Range):  Temp:  [97.7 °F (36.5 °C)-98.6 °F (37 °C)] 98.4 °F (36.9 °C)  Pulse:  [] 75  Resp:  [17-41] 29  SpO2:  [95 %-99 %] 98 %  BP: (142-189)/(65-83) 142/65     Weight: 63.5 kg (139 lb 15.9 oz) (10/27/22 1334)  Body mass index is 26.45 kg/m².  Body surface area is 1.65 meters squared.    I/O last 3 completed shifts:  In: 3084.7 [I.V.:360.9; Blood:400.4; NG/GT:1135; IV Piggyback:1188.4]  Out: 1732 [Urine:1250; Other:357; Stool:125]    Physical Exam  Vitals and nursing note reviewed.   Constitutional:       General: She is not in acute distress.     Appearance: She is well-developed. She is ill-appearing and toxic-appearing.      Comments: Patient somnolent   HENT:      Head: Normocephalic and atraumatic.      Mouth/Throat:      Mouth: Mucous membranes are dry.    Eyes:      Conjunctiva/sclera: Conjunctivae normal.   Cardiovascular:      Rate and Rhythm: Normal rate and regular rhythm.      Heart sounds: Normal heart sounds.   Pulmonary:      Effort: Pulmonary effort is normal. No respiratory distress.      Breath sounds: No wheezing or rales.      Comments: Coarse breath sounds bilaterally  Abdominal:      General: Bowel sounds are normal. There is no distension.      Palpations: Abdomen is soft.      Tenderness: There is no abdominal tenderness.   Musculoskeletal:         General: No tenderness. Normal range of motion.      Cervical back: Normal range of motion and neck supple.      Right lower leg: No edema.      Left lower leg: No edema.   Lymphadenopathy:      Cervical: No cervical adenopathy.   Skin:     General: Skin is warm and dry.      Capillary Refill: Capillary refill takes less than 2 seconds.      Findings: No rash.   Neurological:      General: No focal deficit present.      Mental Status: She is oriented to person, place, and time.   Psychiatric:      Comments: Patient arouses to voice and answers questions, though difficult to understand.        Significant Labs:  CBC:   Recent Labs   Lab 10/29/22  0310   WBC 34.44*   RBC 3.03*   HGB 8.7*   HCT 26.5*      MCV 88   MCH 28.7   MCHC 32.8     CMP:   Recent Labs   Lab 10/29/22  0310   *   CALCIUM 7.3*   ALBUMIN 2.4*   PROT 5.1*      K 3.5   CO2 20*      *   CREATININE 4.8*   ALKPHOS 86   ALT 52*   AST 58*   BILITOT 0.8     All labs within the past 24 hours have been reviewed.     Significant Imaging:

## 2022-10-29 NOTE — SUBJECTIVE & OBJECTIVE
Interval History/Significant Events: NAEON, pt more awake and alert. Appropriately responding to questions. VSS, although pt hypertensive. Resuming home medications.      Review of Systems   Unable to perform ROS: Mental status change   Genitourinary:  Positive for dysuria.   Psychiatric/Behavioral:  Positive for confusion.    Objective:     Vital Signs (Most Recent):  Temp: 98.4 °F (36.9 °C) (10/29/22 0815)  Pulse: 75 (10/29/22 1000)  Resp: (!) 29 (10/29/22 1000)  BP: (!) 142/65 (10/29/22 1000)  SpO2: 98 % (10/29/22 1000)   Vital Signs (24h Range):  Temp:  [97.7 °F (36.5 °C)-98.6 °F (37 °C)] 98.4 °F (36.9 °C)  Pulse:  [] 75  Resp:  [17-41] 29  SpO2:  [95 %-99 %] 98 %  BP: (142-189)/(65-83) 142/65   Weight: 63.5 kg (139 lb 15.9 oz)  Body mass index is 26.45 kg/m².      Intake/Output Summary (Last 24 hours) at 10/29/2022 1137  Last data filed at 10/29/2022 1105  Gross per 24 hour   Intake 750 ml   Output 1175 ml   Net -425 ml       Physical Exam  Vitals and nursing note reviewed.   Constitutional:       General: She is not in acute distress.     Appearance: She is well-developed. She is ill-appearing. She is not toxic-appearing.      Comments: Patient somnolent, moans intermittently    HENT:      Head: Normocephalic and atraumatic.      Mouth/Throat:      Mouth: Mucous membranes are dry.   Eyes:      Conjunctiva/sclera: Conjunctivae normal.   Cardiovascular:      Rate and Rhythm: Normal rate and regular rhythm.      Heart sounds: Normal heart sounds.   Pulmonary:      Effort: Pulmonary effort is normal. No respiratory distress.      Breath sounds: No wheezing or rales.   Abdominal:      General: Bowel sounds are normal. There is no distension.      Palpations: Abdomen is soft.      Tenderness: There is no abdominal tenderness.   Musculoskeletal:         General: No tenderness. Normal range of motion.      Cervical back: Normal range of motion and neck supple.      Right lower leg: No edema.      Left lower leg:  No edema.   Lymphadenopathy:      Cervical: No cervical adenopathy.   Skin:     General: Skin is warm and dry.      Capillary Refill: Capillary refill takes less than 2 seconds.      Findings: No rash.   Neurological:      General: No focal deficit present.      Mental Status: She is oriented to person, place, and time.   Psychiatric:         Mood and Affect: Mood normal.      Comments: Patient no longer sedated       Vents:  Oxygen Concentration (%): 50 (10/27/22 0505)  Lines/Drains/Airways       Central Venous Catheter Line  Duration             Trialysis (Dialysis) Catheter 10/25/22 2329 left internal jugular 3 days              Drain  Duration                  Urethral Catheter 10/23/22 2230 16 Fr. 5 days         NG/OG Tube 10/27/22 1300 nasogastric 14 Fr. Right nostril 1 day         Fecal Incontinence  10/28/22 1901 <1 day              Peripheral Intravenous Line  Duration                  Midline Catheter Insertion/Assessment  - Single Lumen 10/18/22 1831 Left brachial vein 18g x 10cm 10 days                  Significant Labs:    CBC/Anemia Profile:  Recent Labs   Lab 10/28/22  0304 10/28/22  0955 10/29/22  0310   WBC 38.44* 36.75* 34.44*   HGB 6.4* 9.4* 8.7*   HCT 19.2* 28.0* 26.5*    253 274   MCV 84 86 88   RDW 14.3 14.0 14.6*        Chemistries:  Recent Labs   Lab 10/28/22  0304 10/28/22  1717 10/29/22  0310    144 145   K 4.1 3.9 3.5    108 110   CO2 22* 23 20*   * 116* 127*   CREATININE 4.2* 4.6* 4.8*   CALCIUM 7.4* 7.6* 7.3*   ALBUMIN 2.7* 2.8* 2.4*   PROT 5.1*  --  5.1*   BILITOT 1.1*  --  0.8   ALKPHOS 76  --  86   ALT 42  --  52*   AST 67*  --  58*   MG 2.3 2.4 2.4   PHOS 5.8* 5.9* 5.7*       CMP:   Recent Labs   Lab 10/28/22  0304 10/28/22  1717 10/29/22  0310    144 145   K 4.1 3.9 3.5    108 110   CO2 22* 23 20*   * 172* 134*   * 116* 127*   CREATININE 4.2* 4.6* 4.8*   CALCIUM 7.4* 7.6* 7.3*   PROT 5.1*  --  5.1*   ALBUMIN 2.7* 2.8* 2.4*    BILITOT 1.1*  --  0.8   ALKPHOS 76  --  86   AST 67*  --  58*   ALT 42  --  52*   ANIONGAP 12 13 15       All pertinent labs within the past 24 hours have been reviewed.    Significant Imaging:  I have reviewed all pertinent imaging results/findings within the past 24 hours.

## 2022-10-29 NOTE — NURSING
Patient admitted to 14wt  Patient alert to self only.  Cardiac monitoring maintained throughout transfer. Patient connected to telemetry, assessed, denies any pain, oriented to room, and instructed to call for assistance or any needs.  Call bell in reach. Reviewed goals for today. Will continue to monitor

## 2022-10-29 NOTE — RESIDENT HANDOFF
Handoff     Primary Team: [facility]ITICAL CARE MEDICINE TEAM 2 Room Number: [id number]     Patient Name: Kristin Goodman MRN: [id number]     Date of Birth: [id number] Allergies: Ampicillin, Peaches [peach (prunus persica)], Penicillins, and Sulfa (sulfonamide antibiotics)     Age: 77 y.o. Admit Date: 10/17/2022     Sex: female  BMI: Body mass index is 26.45 kg/m².     Code Status: Full Code        Illness Level (current clinical status): Watcher - No    Reason for Admission: Microscopic polyangiitis    Brief HPI (pertinent PMH and diagnosis or differential diagnosis):   78 yo F with chronic diastolic heart failure, HTN, OA, who is admitted for respiratory failure and renal failure initially presented to [facility] ED 10/17 with progressive shortness of breath, weakness, cough, and hemoptysis. Previously, she presented the ED 9/24 with hemoptysis and CT and CXR at that time concerning for PNA which she was given a 10 day course of abx and albuterol. She followed up with her PCP and had improvement of symptoms. She then presented to the ED 10/14 with no interventions. On presentation for this admission, in addition to above symptoms, she reports fever up to 102.4 and increasing amounts of hemoptysis. CT chest with extensive opacification at admission. She was started on broad spectrum IV abx coverage with Vanc / Zosyn and an infectious work up was sent. Her hospitalization has been complicated by GIB which required 2 u PRBC transfusion and PPI therapy. No EGD with GI, but bleed resolved. Additionally had bilateral cervical adenopathy which was evaluated by ENT and felt to be reactive in nature. She has had worsening renal function and nephrology was consulted and concerned for a GN. Rheumatologic work up in process. Admitted to MICU evening of 10/23 for higher level of care and closer monitoring in setting of her tenuous respiratory status.     Hospital Course (updated, brief assessment by system or problem, significant events):  Initially admitted to hospital medicine, but stepped up to ICU due to hemoptysis. She has completed a course of Abx for PNA. Her presentation was concerning for a vasculitis and Rheumatology and nephrology consulted. Serologic testing revealed microscopic polyangitis. Renal Biopsy done, pending. She has had plex, steroids, rituximab, cyclophosphamide, and CRRT. Nephro plans to continue HD. Tapering her steroids per rheum recs. Doing well on 2L NC. Stable for step down.     Tasks (specific, using if-then statements):   - follow up rheum recs for steroid tapers and rituximab and cyclophosphamide plans.  - if concerned for airway or respiratory status, contact MICU  - follow up nephro recs  - if mental status worsens, delirium precautions. Avoid medications that precipitate delirium.  - if blood pressure uncontrolled with lisinopril and amlodipine (home meds), resume other home meds once able to tolerate PO.  - if emesis occurs, stop tube feeds and check for residuals.      Discharge Disposition: Home or Self Care

## 2022-10-29 NOTE — ASSESSMENT & PLAN NOTE
- Continue home medications as tolerated  - rechecking TSH today, unclear whether patient was taking this medication at home

## 2022-10-30 PROBLEM — R30.0 DYSURIA: Status: ACTIVE | Noted: 2022-10-30

## 2022-10-30 LAB
ALBUMIN SERPL BCP-MCNC: 2.9 G/DL (ref 3.5–5.2)
ALBUMIN SERPL BCP-MCNC: 2.9 G/DL (ref 3.5–5.2)
ALBUMIN SERPL BCP-MCNC: 3 G/DL (ref 3.5–5.2)
ALBUMIN SERPL BCP-MCNC: 3.1 G/DL (ref 3.5–5.2)
ALP SERPL-CCNC: 89 U/L (ref 55–135)
ALT SERPL W/O P-5'-P-CCNC: 29 U/L (ref 10–44)
ANION GAP SERPL CALC-SCNC: 14 MMOL/L (ref 8–16)
ANION GAP SERPL CALC-SCNC: 14 MMOL/L (ref 8–16)
ANION GAP SERPL CALC-SCNC: 16 MMOL/L (ref 8–16)
ANION GAP SERPL CALC-SCNC: 18 MMOL/L (ref 8–16)
AST SERPL-CCNC: 33 U/L (ref 10–40)
BACTERIA #/AREA URNS AUTO: ABNORMAL /HPF
BASOPHILS NFR BLD: 0 % (ref 0–1.9)
BILIRUB SERPL-MCNC: 0.9 MG/DL (ref 0.1–1)
BILIRUB UR QL STRIP: NEGATIVE
BLD PROD TYP BPU: NORMAL
BLOOD UNIT EXPIRATION DATE: NORMAL
BLOOD UNIT TYPE CODE: 1700
BLOOD UNIT TYPE CODE: 7300
BLOOD UNIT TYPE CODE: 8400
BLOOD UNIT TYPE: NORMAL
BUN SERPL-MCNC: 124 MG/DL (ref 8–23)
BUN SERPL-MCNC: 126 MG/DL (ref 8–23)
BUN SERPL-MCNC: 126 MG/DL (ref 8–23)
BUN SERPL-MCNC: 129 MG/DL (ref 8–23)
CALCIUM SERPL-MCNC: 8.2 MG/DL (ref 8.7–10.5)
CALCIUM SERPL-MCNC: 8.4 MG/DL (ref 8.7–10.5)
CALCIUM SERPL-MCNC: 8.7 MG/DL (ref 8.7–10.5)
CALCIUM SERPL-MCNC: 8.8 MG/DL (ref 8.7–10.5)
CHLORIDE SERPL-SCNC: 106 MMOL/L (ref 95–110)
CHLORIDE SERPL-SCNC: 107 MMOL/L (ref 95–110)
CLARITY UR REFRACT.AUTO: ABNORMAL
CO2 SERPL-SCNC: 21 MMOL/L (ref 23–29)
CO2 SERPL-SCNC: 24 MMOL/L (ref 23–29)
CO2 SERPL-SCNC: 25 MMOL/L (ref 23–29)
CO2 SERPL-SCNC: 25 MMOL/L (ref 23–29)
CODING SYSTEM: NORMAL
COLOR UR AUTO: ABNORMAL
CREAT SERPL-MCNC: 4.2 MG/DL (ref 0.5–1.4)
CREAT SERPL-MCNC: 4.3 MG/DL (ref 0.5–1.4)
CREAT SERPL-MCNC: 4.4 MG/DL (ref 0.5–1.4)
CREAT SERPL-MCNC: 4.4 MG/DL (ref 0.5–1.4)
DIFFERENTIAL METHOD: ABNORMAL
DISPENSE STATUS: NORMAL
EOSINOPHIL NFR BLD: 1 % (ref 0–8)
ERYTHROCYTE [DISTWIDTH] IN BLOOD BY AUTOMATED COUNT: 14.9 % (ref 11.5–14.5)
EST. GFR  (NO RACE VARIABLE): 10.2 ML/MIN/1.73 M^2
EST. GFR  (NO RACE VARIABLE): 10.5 ML/MIN/1.73 M^2
EST. GFR  (NO RACE VARIABLE): 9.9 ML/MIN/1.73 M^2
EST. GFR  (NO RACE VARIABLE): 9.9 ML/MIN/1.73 M^2
FERRITIN SERPL-MCNC: 374 NG/ML (ref 20–300)
FOLATE SERPL-MCNC: 9.6 NG/ML (ref 4–24)
GLUCOSE SERPL-MCNC: 125 MG/DL (ref 70–110)
GLUCOSE SERPL-MCNC: 127 MG/DL (ref 70–110)
GLUCOSE SERPL-MCNC: 143 MG/DL (ref 70–110)
GLUCOSE SERPL-MCNC: 167 MG/DL (ref 70–110)
GLUCOSE UR QL STRIP: NEGATIVE
HCT VFR BLD AUTO: 30.1 % (ref 37–48.5)
HGB BLD-MCNC: 9.8 G/DL (ref 12–16)
HGB UR QL STRIP: ABNORMAL
HYALINE CASTS UR QL AUTO: 0 /LPF
HYPOCHROMIA BLD QL SMEAR: ABNORMAL
IMM GRANULOCYTES # BLD AUTO: ABNORMAL K/UL (ref 0–0.04)
IMM GRANULOCYTES NFR BLD AUTO: ABNORMAL % (ref 0–0.5)
IRON SERPL-MCNC: 85 UG/DL (ref 30–160)
KETONES UR QL STRIP: NEGATIVE
LEUKOCYTE ESTERASE UR QL STRIP: ABNORMAL
LYMPHOCYTES NFR BLD: 1 % (ref 18–48)
MAGNESIUM SERPL-MCNC: 2 MG/DL (ref 1.6–2.6)
MAGNESIUM SERPL-MCNC: 2.1 MG/DL (ref 1.6–2.6)
MAGNESIUM SERPL-MCNC: 2.2 MG/DL (ref 1.6–2.6)
MAGNESIUM SERPL-MCNC: 2.2 MG/DL (ref 1.6–2.6)
MCH RBC QN AUTO: 28.3 PG (ref 27–31)
MCHC RBC AUTO-ENTMCNC: 32.6 G/DL (ref 32–36)
MCV RBC AUTO: 87 FL (ref 82–98)
METAMYELOCYTES NFR BLD MANUAL: 1 %
MICROSCOPIC COMMENT: ABNORMAL
MONOCYTES NFR BLD: 5 % (ref 4–15)
NEUTROPHILS NFR BLD: 91 % (ref 38–73)
NEUTS BAND NFR BLD MANUAL: 1 %
NITRITE UR QL STRIP: NEGATIVE
NRBC BLD-RTO: 1 /100 WBC
NUM UNITS TRANS FFP: NORMAL
PH UR STRIP: 6 [PH] (ref 5–8)
PHOSPHATE SERPL-MCNC: 4.9 MG/DL (ref 2.7–4.5)
PHOSPHATE SERPL-MCNC: 4.9 MG/DL (ref 2.7–4.5)
PHOSPHATE SERPL-MCNC: 5 MG/DL (ref 2.7–4.5)
PHOSPHATE SERPL-MCNC: 5.7 MG/DL (ref 2.7–4.5)
PLATELET # BLD AUTO: 269 K/UL (ref 150–450)
PLATELET BLD QL SMEAR: ABNORMAL
PMV BLD AUTO: 11.1 FL (ref 9.2–12.9)
POCT GLUCOSE: 134 MG/DL (ref 70–110)
POCT GLUCOSE: 150 MG/DL (ref 70–110)
POCT GLUCOSE: 172 MG/DL (ref 70–110)
POCT GLUCOSE: 173 MG/DL (ref 70–110)
POCT GLUCOSE: 195 MG/DL (ref 70–110)
POTASSIUM SERPL-SCNC: 3.3 MMOL/L (ref 3.5–5.1)
POTASSIUM SERPL-SCNC: 3.3 MMOL/L (ref 3.5–5.1)
POTASSIUM SERPL-SCNC: 3.6 MMOL/L (ref 3.5–5.1)
POTASSIUM SERPL-SCNC: 4.4 MMOL/L (ref 3.5–5.1)
PROT SERPL-MCNC: 5.8 G/DL (ref 6–8.4)
PROT UR QL STRIP: ABNORMAL
RBC # BLD AUTO: 3.46 M/UL (ref 4–5.4)
RBC #/AREA URNS AUTO: 5 /HPF (ref 0–4)
SATURATED IRON: 28 % (ref 20–50)
SODIUM SERPL-SCNC: 146 MMOL/L (ref 136–145)
SP GR UR STRIP: 1.01 (ref 1–1.03)
TOTAL IRON BINDING CAPACITY: 303 UG/DL (ref 250–450)
TRANSFERRIN SERPL-MCNC: 205 MG/DL (ref 200–375)
URN SPEC COLLECT METH UR: ABNORMAL
VIT B12 SERPL-MCNC: 1372 PG/ML (ref 210–950)
WBC # BLD AUTO: 46.38 K/UL (ref 3.9–12.7)
WBC #/AREA URNS AUTO: 0 /HPF (ref 0–5)
YEAST UR QL AUTO: ABNORMAL

## 2022-10-30 PROCEDURE — 63600175 PHARM REV CODE 636 W HCPCS: Performed by: STUDENT IN AN ORGANIZED HEALTH CARE EDUCATION/TRAINING PROGRAM

## 2022-10-30 PROCEDURE — 99232 SBSQ HOSP IP/OBS MODERATE 35: CPT | Mod: ,,, | Performed by: INTERNAL MEDICINE

## 2022-10-30 PROCEDURE — 85027 COMPLETE CBC AUTOMATED: CPT

## 2022-10-30 PROCEDURE — 25000003 PHARM REV CODE 250: Performed by: PATHOLOGY

## 2022-10-30 PROCEDURE — 99900035 HC TECH TIME PER 15 MIN (STAT)

## 2022-10-30 PROCEDURE — P9017 PLASMA 1 DONOR FRZ W/IN 8 HR: HCPCS | Performed by: STUDENT IN AN ORGANIZED HEALTH CARE EDUCATION/TRAINING PROGRAM

## 2022-10-30 PROCEDURE — 25000003 PHARM REV CODE 250: Performed by: HOSPITALIST

## 2022-10-30 PROCEDURE — 94660 CPAP INITIATION&MGMT: CPT

## 2022-10-30 PROCEDURE — 80053 COMPREHEN METABOLIC PANEL: CPT

## 2022-10-30 PROCEDURE — 97110 THERAPEUTIC EXERCISES: CPT

## 2022-10-30 PROCEDURE — 63600175 PHARM REV CODE 636 W HCPCS: Performed by: PATHOLOGY

## 2022-10-30 PROCEDURE — C9113 INJ PANTOPRAZOLE SODIUM, VIA: HCPCS | Performed by: STUDENT IN AN ORGANIZED HEALTH CARE EDUCATION/TRAINING PROGRAM

## 2022-10-30 PROCEDURE — 81001 URINALYSIS AUTO W/SCOPE: CPT | Performed by: STUDENT IN AN ORGANIZED HEALTH CARE EDUCATION/TRAINING PROGRAM

## 2022-10-30 PROCEDURE — 20600001 HC STEP DOWN PRIVATE ROOM

## 2022-10-30 PROCEDURE — 99233 PR SUBSEQUENT HOSPITAL CARE,LEVL III: ICD-10-PCS | Mod: ,,, | Performed by: STUDENT IN AN ORGANIZED HEALTH CARE EDUCATION/TRAINING PROGRAM

## 2022-10-30 PROCEDURE — 36415 COLL VENOUS BLD VENIPUNCTURE: CPT | Performed by: STUDENT IN AN ORGANIZED HEALTH CARE EDUCATION/TRAINING PROGRAM

## 2022-10-30 PROCEDURE — 80100014 HC HEMODIALYSIS 1:1

## 2022-10-30 PROCEDURE — 97535 SELF CARE MNGMENT TRAINING: CPT

## 2022-10-30 PROCEDURE — 84466 ASSAY OF TRANSFERRIN: CPT | Performed by: STUDENT IN AN ORGANIZED HEALTH CARE EDUCATION/TRAINING PROGRAM

## 2022-10-30 PROCEDURE — 25000003 PHARM REV CODE 250

## 2022-10-30 PROCEDURE — 92610 EVALUATE SWALLOWING FUNCTION: CPT

## 2022-10-30 PROCEDURE — 82746 ASSAY OF FOLIC ACID SERUM: CPT | Performed by: STUDENT IN AN ORGANIZED HEALTH CARE EDUCATION/TRAINING PROGRAM

## 2022-10-30 PROCEDURE — 82728 ASSAY OF FERRITIN: CPT | Performed by: STUDENT IN AN ORGANIZED HEALTH CARE EDUCATION/TRAINING PROGRAM

## 2022-10-30 PROCEDURE — 99231 SBSQ HOSP IP/OBS SF/LOW 25: CPT | Mod: ,,, | Performed by: INTERNAL MEDICINE

## 2022-10-30 PROCEDURE — 85007 BL SMEAR W/DIFF WBC COUNT: CPT

## 2022-10-30 PROCEDURE — 83735 ASSAY OF MAGNESIUM: CPT | Performed by: STUDENT IN AN ORGANIZED HEALTH CARE EDUCATION/TRAINING PROGRAM

## 2022-10-30 PROCEDURE — 83735 ASSAY OF MAGNESIUM: CPT | Mod: 91

## 2022-10-30 PROCEDURE — 99231 PR SUBSEQUENT HOSPITAL CARE,LEVL I: ICD-10-PCS | Mod: ,,, | Performed by: INTERNAL MEDICINE

## 2022-10-30 PROCEDURE — 80069 RENAL FUNCTION PANEL: CPT | Mod: 91 | Performed by: STUDENT IN AN ORGANIZED HEALTH CARE EDUCATION/TRAINING PROGRAM

## 2022-10-30 PROCEDURE — 82607 VITAMIN B-12: CPT | Performed by: STUDENT IN AN ORGANIZED HEALTH CARE EDUCATION/TRAINING PROGRAM

## 2022-10-30 PROCEDURE — 99233 SBSQ HOSP IP/OBS HIGH 50: CPT | Mod: ,,, | Performed by: STUDENT IN AN ORGANIZED HEALTH CARE EDUCATION/TRAINING PROGRAM

## 2022-10-30 PROCEDURE — 99232 PR SUBSEQUENT HOSPITAL CARE,LEVL II: ICD-10-PCS | Mod: ,,, | Performed by: INTERNAL MEDICINE

## 2022-10-30 PROCEDURE — 27201109 HC SYSTEM FECAL MANAGEMENT

## 2022-10-30 PROCEDURE — 36514 APHERESIS PLASMA: CPT

## 2022-10-30 PROCEDURE — 84100 ASSAY OF PHOSPHORUS: CPT

## 2022-10-30 PROCEDURE — 97168 OT RE-EVAL EST PLAN CARE: CPT

## 2022-10-30 PROCEDURE — P9045 ALBUMIN (HUMAN), 5%, 250 ML: HCPCS | Mod: JG | Performed by: PATHOLOGY

## 2022-10-30 PROCEDURE — 25000003 PHARM REV CODE 250: Performed by: STUDENT IN AN ORGANIZED HEALTH CARE EDUCATION/TRAINING PROGRAM

## 2022-10-30 RX ADMIN — LEVOTHYROXINE SODIUM 25 MCG: 25 TABLET ORAL at 05:10

## 2022-10-30 RX ADMIN — HEPARIN SODIUM 3200 UNITS: 1000 INJECTION, SOLUTION INTRAVENOUS; SUBCUTANEOUS at 09:10

## 2022-10-30 RX ADMIN — AMLODIPINE BESYLATE 10 MG: 10 TABLET ORAL at 11:10

## 2022-10-30 RX ADMIN — HYDRALAZINE HYDROCHLORIDE 10 MG: 20 INJECTION, SOLUTION INTRAMUSCULAR; INTRAVENOUS at 12:10

## 2022-10-30 RX ADMIN — QUETIAPINE FUMARATE 25 MG: 25 TABLET ORAL at 09:10

## 2022-10-30 RX ADMIN — METHYLPREDNISOLONE SODIUM SUCCINATE 24 MG: 40 INJECTION, POWDER, FOR SOLUTION INTRAMUSCULAR; INTRAVENOUS at 11:10

## 2022-10-30 RX ADMIN — ATOVAQUONE 1500 MG: 750 SUSPENSION ORAL at 11:10

## 2022-10-30 RX ADMIN — CALCIUM GLUCONATE 1 G: 98 INJECTION, SOLUTION INTRAVENOUS at 10:10

## 2022-10-30 RX ADMIN — LISINOPRIL 40 MG: 20 TABLET ORAL at 11:10

## 2022-10-30 RX ADMIN — DIPHENHYDRAMINE HYDROCHLORIDE 25 MG: 50 INJECTION, SOLUTION INTRAMUSCULAR; INTRAVENOUS at 09:10

## 2022-10-30 RX ADMIN — CLONIDINE HYDROCHLORIDE 0.2 MG: 0.1 TABLET ORAL at 05:10

## 2022-10-30 RX ADMIN — HYDRALAZINE HYDROCHLORIDE 10 MG: 20 INJECTION, SOLUTION INTRAMUSCULAR; INTRAVENOUS at 09:10

## 2022-10-30 RX ADMIN — Medication 6 MG: at 09:10

## 2022-10-30 RX ADMIN — CLONIDINE HYDROCHLORIDE 0.2 MG: 0.1 TABLET ORAL at 12:10

## 2022-10-30 RX ADMIN — METHOCARBAMOL 500 MG: 500 TABLET ORAL at 12:10

## 2022-10-30 RX ADMIN — ALBUMIN (HUMAN) 50 G: 12.5 SOLUTION INTRAVENOUS at 09:10

## 2022-10-30 RX ADMIN — PANTOPRAZOLE SODIUM 40 MG: 40 INJECTION, POWDER, FOR SOLUTION INTRAVENOUS at 11:10

## 2022-10-30 NOTE — PLAN OF CARE
Problem: Occupational Therapy  Goal: Occupational Therapy Goal  Description: Goals to be met by: 11/15     Patient will increase functional independence with ADLs by performing:    UE Dressing with Modified [place].  LE Dressing with Modified [place].  Grooming while standing with Modified [place].  Toileting from toilet with Modified [place] for hygiene and clothing management.   Supine to sit with Modified [place].  Step transfer with Modified [place]  Toilet transfer to toilet with Modified [place].    Outcome: Ongoing, Progressing
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
## 2022-10-30 NOTE — ASSESSMENT & PLAN NOTE
Patient with baseline creatinine of 1 as recent as August 2022 with progressive worsening beginning in early October 1.3 (10/13)->1.6 (10/17)->3.0 (10/23) despite IV fluids.  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  Patient noted to have new onset proteinuria and hematuria over the last 1 month.  Additionally, would consider ATN in the setting of suspected sepsis from multifocal pneumonia.     Concerns for glomerulonephritis given patient's current clinical picture. Initial urine microscopy demonstrating muddy brown casts with likely acanthocytes noted as well. MITUL positive, proteinase 3 ab weakly positive, MPO strongly positive. Patient s/p high-dose IV solumedrol x3 doses, now on Solumedrol 50mg qd. Underwent kidney bx 10/27. Rheumatology consulted, and patient started on Plex, Ritux/cytoxan. Patient has received 2 rounds of Plex, as well as 1 dose of Ritux and cytoxan on 10/27. Given continually worsening renal function and uremic encephalopathy, patient underwent SLED without complication on 10/27. Transferred to floor on 10/29. Unable to tolerate HD on 10/29 due to tachycardia and anxiety. Planned for Plex #4 today.       Recommendations:  - no need for RRT today, will re-evaluate tomorrow for HD  - plex #4 today per rheum  - f/u kidney bx results   - continue plex, Ritux/cytoxan per rheum (next dose of ritux and cytoxan on 11/3)  - continue steroid taper  - strict intake and output  - renally dose medications and avoid nephrotoxins when possible  - replete electrolytes as appropriate

## 2022-10-30 NOTE — PLAN OF CARE
Plan of care reviewed with pts daughter. Pt aaox1, to self only. Apheresis done at the bedside. Tube feed running at goal. Flexy seal in place. Pt more alert and talkative today. Pt complain of burning while urinating. UA sent of. VSS. Pt remained free of falls, trauma, and injury. Will continue to monitor

## 2022-10-30 NOTE — PROGRESS NOTES
Bedside HD started via L CVC. Lines connected and secure. Orders verified. Report received from primary nurse. Net UF 0 mL

## 2022-10-30 NOTE — ASSESSMENT & PLAN NOTE
Patient w/o CKD presenting with WILFREDO with rapidly increasing sCr (baseline sCr 1.0-1.2). New onset proteinuria/hematuria in last 30 days. .  DDx: ATN vs GN.       - Renal Bx done, f/u pathology  - High-dose steroids completed, tapering per rheumatology recs.  - Rituximab and cytoxan per rheum recs.  - Undergoing PLEX, see rheum note  - Trend sCr  - Strict I&Os  - Avoid nephrotoxic agents  - Renally adjust medications  - Nephro and Rheumatology following

## 2022-10-30 NOTE — PROGRESS NOTES
Noted elevated HR and pt complaining  about not feeling well. MD notified and ordered to end tx, Primary nurse at bedside. HD complete. Patient rinsed back. Lines clamped. R CVC flushed with NS, locked with heparin (see MAR) and secured. +361 mL NS given. . Report given to primary nurse.

## 2022-10-30 NOTE — SUBJECTIVE & OBJECTIVE
Interval History: Unable to tolerate HD last night due to tachycardia and anxiety. Got ~1 hr of treatment. PLEX today. No need for dialysis today.     Review of patient's allergies indicates:   Allergen Reactions    Ampicillin     Peaches [peach (prunus persica)] Other (See Comments)     Pt unable to state type of reaction. Information obtained from daughter who states she was informed of allergy from patient.    Penicillins      Other reaction(s): Hives    Sulfa (sulfonamide antibiotics) Rash and Hives     Current Facility-Administered Medications   Medication Frequency    0.9%  NaCl infusion (for blood administration) Q24H PRN    0.9%  NaCl infusion (for blood administration) Q24H PRN    acetaminophen tablet 650 mg Q4H PRN    albuterol-ipratropium 2.5 mg-0.5 mg/3 mL nebulizer solution 3 mL Q4H PRN    aluminum-magnesium hydroxide-simethicone 200-200-20 mg/5 mL suspension 30 mL QID PRN    amLODIPine tablet 10 mg Daily    atovaquone 750 mg/5 mL oral liquid 1,500 mg Daily    bisacodyL suppository 10 mg Daily PRN    cloNIDine tablet 0.2 mg Q4H PRN    dextrose 10% bolus 125 mL PRN    dextrose 10% bolus 250 mL PRN    glucagon (human recombinant) injection 1 mg PRN    glucose chewable tablet 16 g PRN    glucose chewable tablet 24 g PRN    hydrALAZINE injection 10 mg Q8H PRN    insulin aspart U-100 pen 1-10 Units Q6H PRN    levothyroxine tablet 25 mcg Before breakfast    lisinopriL tablet 40 mg Daily    melatonin tablet 6 mg Nightly PRN    methocarbamoL tablet 500 mg TID PRN    methylPREDNISolone sodium succinate injection 24 mg Daily    naloxone 0.4 mg/mL injection 0.02 mg PRN    ondansetron disintegrating tablet 8 mg Q8H PRN    pantoprazole injection 40 mg Daily    prochlorperazine injection Soln 5 mg Q6H PRN    QUEtiapine tablet 25 mg QHS    simethicone chewable tablet 80 mg QID PRN    sodium chloride 0.9% 250 mL flush bag 1 time in Clinic/HOD    sodium chloride 0.9% flush 10 mL PRN    sodium chloride 0.9% flush 5 mL  PRN     Facility-Administered Medications Ordered in Other Encounters   Medication Frequency    celecoxib capsule 400 mg Once    fentaNYL 50 mcg/mL injection  mcg PRN    LIDOcaine (PF) 10 mg/ml (1%) injection 10 mg Once PRN    LIDOcaine (PF) 10 mg/ml (1%) injection 10 mg Once    midazolam (VERSED) 1 mg/mL injection 0.5-4 mg PRN    ropivacaine 0.2% Nimbus PainPRO Pump infusion 500 ML Continuous       Objective:     Vital Signs (Most Recent):  Temp: 96.8 °F (36 °C) (10/30/22 0815)  Pulse: 90 (10/30/22 1101)  Resp: 19 (10/30/22 0815)  BP: (!) 158/69 (10/30/22 1144)  SpO2: 98 % (10/30/22 1101)  O2 Device (Oxygen Therapy): nasal cannula (10/30/22 0815)   Vital Signs (24h Range):  Temp:  [96.8 °F (36 °C)-98.6 °F (37 °C)] 96.8 °F (36 °C)  Pulse:  [] 90  Resp:  [14-23] 19  SpO2:  [92 %-98 %] 98 %  BP: (134-192)/(60-81) 158/69     Weight: 63.5 kg (139 lb 15.9 oz) (10/27/22 1334)  Body mass index is 26.45 kg/m².  Body surface area is 1.65 meters squared.    I/O last 3 completed shifts:  In: 535 [NG/GT:535]  Out: 1644 [Urine:1200; Drains:150; Other:69; Stool:225]    Physical Exam  Vitals and nursing note reviewed.   Constitutional:       General: She is not in acute distress.     Appearance: She is well-developed. She is ill-appearing.      Comments: Patient somnolent   HENT:      Head: Normocephalic and atraumatic.      Mouth/Throat:      Mouth: Mucous membranes are dry.   Eyes:      Conjunctiva/sclera: Conjunctivae normal.   Cardiovascular:      Rate and Rhythm: Normal rate and regular rhythm.      Heart sounds: Normal heart sounds.   Pulmonary:      Effort: Pulmonary effort is normal. No respiratory distress.      Breath sounds: No wheezing or rales.      Comments: Coarse breath sounds bilaterally  Abdominal:      General: Bowel sounds are normal. There is no distension.      Palpations: Abdomen is soft.      Tenderness: There is no abdominal tenderness.   Musculoskeletal:         General: No tenderness. Normal  range of motion.      Cervical back: Normal range of motion and neck supple.      Right lower leg: No edema.      Left lower leg: No edema.   Lymphadenopathy:      Cervical: No cervical adenopathy.   Skin:     General: Skin is warm and dry.      Capillary Refill: Capillary refill takes less than 2 seconds.      Findings: No rash.   Neurological:      General: No focal deficit present.      Mental Status: She is oriented to person, place, and time.   Psychiatric:      Comments: Patient arouses to voice and answers questions, though difficult to understand.        Significant Labs:  CBC:   Recent Labs   Lab 10/30/22  0426   WBC 46.38*   RBC 3.46*   HGB 9.8*   HCT 30.1*      MCV 87   MCH 28.3   MCHC 32.6       CMP:   Recent Labs   Lab 10/30/22  0426   *  127*   CALCIUM 8.7  8.8   ALBUMIN 2.9*  2.9*   PROT 5.8*   *  146*   K 3.3*  3.3*   CO2 25  25     107   *  126*   CREATININE 4.4*  4.4*   ALKPHOS 89   ALT 29   AST 33   BILITOT 0.9       All labs within the past 24 hours have been reviewed.     Significant Imaging:

## 2022-10-30 NOTE — ASSESSMENT & PLAN NOTE
Microscopic polyangitis  Multifocal pneumonia  Hemoptysis  78 yo PMHx HTN, hypothyroid, HFpEF, OA admitted for fevers and respiratory symptoms, treated while on Hospital Medicine for multifocal PNA. Course complicated by GIB bleed (no EGD yet d/t PNA), hyponatremia, ATN. MICU consulted for rapidly increasing oxygen requirement (4L NC to BiPAP 15/10 50%), respiratory distress/work of breathing, somnolence. Nephrology consulted for ATN, concerns for possible nephritic disease as  behind ATN d/t acanthrocytes. Reports of scant hemoptysis by nursing staff 10/21, 10/22.     Imaging: CXR: Diffuse bilateral airspace infiltrates w/ c/f alveolar fillings; CT chest wc 10/17 with bl perihilar dense consolidations w/ peripheral patcy areas of GGO, BL PNA, less c/f cardiogenic pulmonary edema.  Labs: WBC uptrending 28.97, Hgb lowest 6.0 (s/p 2 u PRBC), continues to downtrend approx 1 point / day. sCr uptrending, (baseline 1.0-1.2). .        No results for input(s): PH, PCO2, PO2, HCO3, POCSATURATED, BE in the last 72 hours.  Etiology: Microscopic polyangitis likely. Concerns for possible PE as patient was not on thrombotic ppx s/o GIB. Incomplete I/Os charted, though reports of low UOP also renders pulmonary congestion edema as a possibility. Less c/f acute progression of multifocal PNA as adequate ABx coverage and no new fevers recently.     - US DVT BLE unremarkable  - Consideration for urgent BAL +/- repeat chest imaging (last done 10/17)  - Rheumatology consulted  -- Continue Rituximab, cyclophosphamide  -- IV solumedrol per Rheumatology  -- PJP prophylaxis  - Nephrology consulted  -- iHD nephrology  - Transfusion medicine  -- Plasmapheresis started 10/26, plans for 7 more cycles over 14 days.  - Neuro checks  - Wean supplemental O2 as tolerated

## 2022-10-30 NOTE — PROGRESS NOTES
[facility] - [facility] ([place] [id number])  Nephrology  Progress Note    Patient Name: Kristin Goodman  MRN: [id number]  Admission Date: 10/17/2022  Hospital Length of Stay: 13 days  Attending Provider: Ethan De Jesus MD   Primary Care Physician: Lori Hernandez MD  Principal Problem:Microscopic polyangiitis    Subjective:     HPI: Kristin Goodman is a 77 year old female with hypertension, hypothyroidism, HFpEF who presents for cough, shortness of breath, and weakness.  Patient initially presented to ER on 09/24 with hemoptysis and imaging concerning for multilobar pneumonia at which time she was discharged on a 10 day course of levofloxacin.  Patient initially had some improvement but began having worsening symptoms on 10/14.  Patient describes multiple fevers and progressive shortness of breath.  She continues to have intermittent hemoptysis.  Patient with worsening leukocytosis and limited clinical improvement despite broad-spectrum antibiotics.  She remains on cefepime.  Patient is a noted to have baseline creatinine of 1 as recent as 8/2022 but progressive worsening of creatinine in early October.  On admission patient with creatinine of 1.6 (10/17) with subsequent progression and creatinine of 3.0 at time of consultation (10/23).  Nephrology consulted for acute kidney injury.      Interval History: Unable to tolerate HD last night due to tachycardia and anxiety. Got ~1 hr of treatment. PLEX today. No need for dialysis today.     Review of patient's allergies indicates:   Allergen Reactions    Ampicillin     Peaches [peach (prunus persica)] Other (See Comments)     Pt unable to state type of reaction. Information obtained from daughter who states she was informed of allergy from patient.    Penicillins      Other reaction(s): Hives    Sulfa (sulfonamide antibiotics) Rash and Hives     Current Facility-Administered Medications   Medication Frequency    0.9%  NaCl infusion (for blood administration) Q24H PRN     0.9%  NaCl infusion (for blood administration) Q24H PRN    acetaminophen tablet 650 mg Q4H PRN    albuterol-ipratropium 2.5 mg-0.5 mg/3 mL nebulizer solution 3 mL Q4H PRN    aluminum-magnesium hydroxide-simethicone 200-200-20 mg/5 mL suspension 30 mL QID PRN    amLODIPine tablet 10 mg Daily    atovaquone 750 mg/5 mL oral liquid 1,500 mg Daily    bisacodyL suppository 10 mg Daily PRN    cloNIDine tablet 0.2 mg Q4H PRN    dextrose 10% bolus 125 mL PRN    dextrose 10% bolus 250 mL PRN    glucagon (human recombinant) injection 1 mg PRN    glucose chewable tablet 16 g PRN    glucose chewable tablet 24 g PRN    hydrALAZINE injection 10 mg Q8H PRN    insulin aspart U-100 pen 1-10 Units Q6H PRN    levothyroxine tablet 25 mcg Before breakfast    lisinopriL tablet 40 mg Daily    melatonin tablet 6 mg Nightly PRN    methocarbamoL tablet 500 mg TID PRN    methylPREDNISolone sodium succinate injection 24 mg Daily    naloxone 0.4 mg/mL injection 0.02 mg PRN    ondansetron disintegrating tablet 8 mg Q8H PRN    pantoprazole injection 40 mg Daily    prochlorperazine injection Soln 5 mg Q6H PRN    QUEtiapine tablet 25 mg QHS    simethicone chewable tablet 80 mg QID PRN    sodium chloride 0.9% 250 mL flush bag 1 time in Clinic/HOD    sodium chloride 0.9% flush 10 mL PRN    sodium chloride 0.9% flush 5 mL PRN     Facility-Administered Medications Ordered in Other Encounters   Medication Frequency    celecoxib capsule 400 mg Once    fentaNYL 50 mcg/mL injection  mcg PRN    LIDOcaine (PF) 10 mg/ml (1%) injection 10 mg Once PRN    LIDOcaine (PF) 10 mg/ml (1%) injection 10 mg Once    midazolam (VERSED) 1 mg/mL injection 0.5-4 mg PRN    ropivacaine 0.2% [facility] Pain[facility] Pump infusion 500 ML Continuous       Objective:     Vital Signs (Most Recent):  Temp: 96.8 °F (36 °C) (10/30/22 0815)  Pulse: 90 (10/30/22 1101)  Resp: 19 (10/30/22 0815)  BP: (!) 158/69 (10/30/22 1144)  SpO2: 98 % (10/30/22  1101)  O2 Device (Oxygen Therapy): nasal cannula (10/30/22 0815)   Vital Signs (24h Range):  Temp:  [96.8 °F (36 °C)-98.6 °F (37 °C)] 96.8 °F (36 °C)  Pulse:  [] 90  Resp:  [14-23] 19  SpO2:  [92 %-98 %] 98 %  BP: (134-192)/(60-81) 158/69     Weight: 63.5 kg (139 lb 15.9 oz) (10/27/22 1334)  Body mass index is 26.45 kg/m².  Body surface area is 1.65 meters squared.    I/O last 3 completed shifts:  In: 535 [NG/GT:535]  Out: 1644 [Urine:1200; Drains:150; Other:69; Stool:225]    Physical Exam  Vitals and nursing note reviewed.   Constitutional:       General: She is not in acute distress.     Appearance: She is well-developed. She is ill-appearing.      Comments: Patient somnolent   HENT:      Head: Normocephalic and atraumatic.      Mouth/Throat:      Mouth: Mucous membranes are dry.   Eyes:      Conjunctiva/sclera: Conjunctivae normal.   Cardiovascular:      Rate and Rhythm: Normal rate and regular rhythm.      Heart sounds: Normal heart sounds.   Pulmonary:      Effort: Pulmonary effort is normal. No respiratory distress.      Breath sounds: No wheezing or rales.      Comments: Coarse breath sounds bilaterally  Abdominal:      General: Bowel sounds are normal. There is no distension.      Palpations: Abdomen is soft.      Tenderness: There is no abdominal tenderness.   Musculoskeletal:         General: No tenderness. Normal range of motion.      Cervical back: Normal range of motion and neck supple.      Right lower leg: No edema.      Left lower leg: No edema.   Lymphadenopathy:      Cervical: No cervical adenopathy.   Skin:     General: Skin is warm and dry.      Capillary Refill: Capillary refill takes less than 2 seconds.      Findings: No rash.   Neurological:      General: No focal deficit present.      Mental Status: She is oriented to person, place, and time.   Psychiatric:      Comments: Patient arouses to voice and answers questions, though difficult to understand.        Significant Labs:  CBC:    Recent Labs   Lab 10/30/22  0426   WBC 46.38*   RBC 3.46*   HGB 9.8*   HCT 30.1*      MCV 87   MCH 28.3   MCHC 32.6       CMP:   Recent Labs   Lab 10/30/22  0426   *  127*   CALCIUM 8.7  8.8   ALBUMIN 2.9*  2.9*   PROT 5.8*   *  146*   K 3.3*  3.3*   CO2 25  25     107   *  126*   CREATININE 4.4*  4.4*   ALKPHOS 89   ALT 29   AST 33   BILITOT 0.9       All labs within the past 24 hours have been reviewed.     Significant Imaging:       Assessment/Plan:     Acute renal failure superimposed on stage 3 chronic kidney disease  Patient with baseline creatinine of 1 as recent as August 2022 with progressive worsening beginning in early October 1.3 (10/13)->1.6 (10/17)->3.0 (10/23) despite IV fluids.  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  Patient noted to have new onset proteinuria and hematuria over the last 1 month.  Additionally, would consider ATN in the setting of suspected sepsis from multifocal pneumonia.     Concerns for glomerulonephritis given patient's current clinical picture. Initial urine microscopy demonstrating muddy brown casts with likely acanthocytes noted as well. MITUL positive, proteinase 3 ab weakly positive, MPO strongly positive. Patient s/p high-dose IV solumedrol x3 doses, now on Solumedrol 50mg qd. Underwent kidney bx 10/27. Rheumatology consulted, and patient started on Plex, Ritux/cytoxan. Patient has received 2 rounds of Plex, as well as 1 dose of Ritux and cytoxan on 10/27. Given continually worsening renal function and uremic encephalopathy, patient underwent SLED without complication on 10/27. Transferred to floor on 10/29. Unable to tolerate HD on 10/29 due to tachycardia and anxiety. Planned for Plex #4 today.       Recommendations:  - no need for RRT today, will re-evaluate tomorrow for HD  - plex #4 today per rheum  - f/u kidney bx results   - continue plex,  Ritux/cytoxan per rheum (next dose of ritux and cytoxan on 11/3)  - continue steroid taper  - strict intake and output  - renally dose medications and avoid nephrotoxins when possible  - replete electrolytes as appropriate        Thank you for your consult. I will follow-up with patient. Please contact us if you have any additional questions.    Familia Mendoza MD  Nephrology  [facility] - Intensive Care ([place])

## 2022-10-30 NOTE — ASSESSMENT & PLAN NOTE
Pulmonary Hypertension   TTE (10/2022) EF 65%, G1DD  Home meds: Lisinopril, Toprol     - Volume control plan per Acute Hypoxemic Respiratory Failure A/P  - Daily weights (standing if tolerated)  - Strict I/Os  - Fluid restriction 1.5 day  - Cardiac diet w/ 2 g Na restriction

## 2022-10-30 NOTE — SUBJECTIVE & OBJECTIVE
Interval History: Doing better today. She is transferred to hospitalist service and off oxygen.    Current Facility-Administered Medications   Medication Frequency    0.9%  NaCl infusion (for blood administration) Q24H PRN    0.9%  NaCl infusion (for blood administration) Q24H PRN    acetaminophen tablet 650 mg Q4H PRN    albuterol-ipratropium 2.5 mg-0.5 mg/3 mL nebulizer solution 3 mL Q4H PRN    aluminum-magnesium hydroxide-simethicone 200-200-20 mg/5 mL suspension 30 mL QID PRN    amLODIPine tablet 10 mg Daily    atovaquone 750 mg/5 mL oral liquid 1,500 mg Daily    bisacodyL suppository 10 mg Daily PRN    cloNIDine tablet 0.2 mg Q4H PRN    dextrose 10% bolus 125 mL PRN    dextrose 10% bolus 250 mL PRN    glucagon (human recombinant) injection 1 mg PRN    glucose chewable tablet 16 g PRN    glucose chewable tablet 24 g PRN    hydrALAZINE injection 10 mg Q8H PRN    insulin aspart U-100 pen 1-10 Units Q6H PRN    levothyroxine tablet 25 mcg Before breakfast    lisinopriL tablet 40 mg Daily    melatonin tablet 6 mg Nightly PRN    methocarbamoL tablet 500 mg TID PRN    methylPREDNISolone sodium succinate injection 24 mg Daily    naloxone 0.4 mg/mL injection 0.02 mg PRN    ondansetron disintegrating tablet 8 mg Q8H PRN    pantoprazole injection 40 mg Daily    prochlorperazine injection Soln 5 mg Q6H PRN    QUEtiapine tablet 25 mg QHS    simethicone chewable tablet 80 mg QID PRN    sodium chloride 0.9% 250 mL flush bag 1 time in Clinic/HOD    sodium chloride 0.9% flush 10 mL PRN    sodium chloride 0.9% flush 5 mL PRN     Facility-Administered Medications Ordered in Other Encounters   Medication Frequency    celecoxib capsule 400 mg Once    fentaNYL 50 mcg/mL injection  mcg PRN    LIDOcaine (PF) 10 mg/ml (1%) injection 10 mg Once PRN    LIDOcaine (PF) 10 mg/ml (1%) injection 10 mg Once    midazolam (VERSED) 1 mg/mL injection 0.5-4 mg PRN    ropivacaine 0.2% [facility] PainPRO Pump infusion 500 ML Continuous      Objective:     Vital Signs (Most Recent):  Temp: 96.8 °F (36 °C) (10/30/22 0815)  Pulse: 90 (10/30/22 1101)  Resp: 19 (10/30/22 0815)  BP: (!) 158/69 (10/30/22 1144)  SpO2: 98 % (10/30/22 1101)  O2 Device (Oxygen Therapy): nasal cannula (10/30/22 0815)   Vital Signs (24h Range):  Temp:  [96.8 °F (36 °C)-98.6 °F (37 °C)] 96.8 °F (36 °C)  Pulse:  [] 90  Resp:  [14-28] 19  SpO2:  [92 %-99 %] 98 %  BP: (134-192)/(60-81) 158/69     Weight: 63.5 kg (139 lb 15.9 oz) (10/27/22 1334)  Body mass index is 26.45 kg/m².  Body surface area is 1.65 meters squared.      Intake/Output Summary (Last 24 hours) at 10/30/2022 1148  Last data filed at 10/30/2022 0559  Gross per 24 hour   Intake 30 ml   Output 869 ml   Net -839 ml       Physical Exam   Constitutional: She is oriented to person, place, and time. No distress.   HENT:   Head: Normocephalic and atraumatic.   Eyes: Pupils are equal, round, and reactive to light.   Cardiovascular: Normal rate and regular rhythm.   No murmur heard.  Pulmonary/Chest: Effort normal and breath sounds normal. No respiratory distress. She has no wheezes.   Abdominal: Soft. Bowel sounds are normal. There is no abdominal tenderness.   Musculoskeletal:         General: No deformity. Normal range of motion.      Cervical back: Normal range of motion.   Neurological: She is alert and oriented to person, place, and time.   Skin: Skin is warm and dry.   Psychiatric: Affect and judgment normal.     Significant Labs:  CBC:   Recent Labs   Lab 10/30/22  0426   WBC 46.38*   HGB 9.8*   HCT 30.1*        CMP:   Recent Labs   Lab 10/30/22  0426   *  127*   CALCIUM 8.7  8.8   ALBUMIN 2.9*  2.9*   PROT 5.8*   *  146*   K 3.3*  3.3*   CO2 25  25     107   *  126*   CREATININE 4.4*  4.4*   ALKPHOS 89   ALT 29   AST 33   BILITOT 0.9       Significant Imaging:  Imaging results within the past 24 hours have been reviewed.

## 2022-10-30 NOTE — PT/OT/SLP PROGRESS
Physical Therapy      Patient Name:  Kristin Goodman   MRN:  [id number]    Patient not seen today secondary to patient currently receiving apheresis. PT unable to return. Will follow-up as scheduled.    Alessia Babcock, PT, DPT  10/30/2022
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
## 2022-10-30 NOTE — PLAN OF CARE
Hospital Medicine ICU Acceptance Note    Date of Admit: 10/17/2022  Date of Transfer / Stepdown: 10/29/2022  Kinsey, C/J, H, L, Onc (IV chemo w/in 1 month), Gyn/Onc, or other special case?: No  ICU team stepping patient down: MICU  ICU team member giving verbal handoff:  Magui Cage  Accepting  team: R    Brief History of Present Illness:      Kristin Goodman is a 78 yo F with chronic diastolic heart failure, HTN, OA, who is admitted for respiratory failure and renal failure initially [place] [facility] [place]17 with progressive shortness of breath, weakness, cough, and hemoptysis. Previously, she presented the ED 9/24 with hemoptysis and CT and CXR at that time concerning for PNA which she was given a 10 day course of abx and albuterol. She followed up with her PCP and had improvement of symptoms. She then presented to the ED 10/14 with no interventions. On presentation for this admission, in addition to above symptoms, she reports fever up to 102.4 and increasing amounts of hemoptysis. CT chest with extensive opacification at admission. She was started on broad spectrum IV abx coverage with Vanc / Zosyn and an infectious work up was sent. Her hospitalization has been complicated by GIB which required 2 u PRBC transfusion and PPI therapy. No EGD with GI, but bleed resolved. Additionally had bilateral cervical adenopathy which was evaluated by ENT and felt to be reactive in nature. She has had worsening renal function and nephrology was consulted and concerned for a GN. Rheumatologic work up in process. Admitted to MICU evening of 10/23 for higher level of care and closer monitoring in setting of her tenuous respiratory status.     Hospital/ICU Course:     Initially admitted to hospital medicine, but stepped up to ICU due to hemoptysis. She has completed a course of Abx for PNA. Her presentation was concerning for a vasculitis and Rheumatology and nephrology consulted. Serologic testing revealed microscopic  polyangitis. Renal Biopsy done, pending. She has had plex, steroids, rituximab, cyclophosphamide, and CRRT. Nephro plans to continue HD. Tapering her steroids per rheum recs. Doing well on 2L NC. Stable for step down.     Consultants and Procedures:     Consultants:  Nephrology   Rheumatology   Transfusion medicine  ENT   Interventional Radiology  Infectious disease   Gastroenterology    Procedures:    Renal biopsy    Transfer Information:     Diet:  NPO, tube feeds    Physical Activity:  PT/OT    To Do / Pending Studies / Follow ups:  - Follow up rheum recs for steroid tapers and rituximab and cyclophosphamide plans.  - Follow up nephro recs  - If mental status worsens, delirium precautions. Avoid medications that precipitate delirium.  - If blood pressure uncontrolled with lisinopril and amlodipine (home meds), resume other home meds once able to tolerate PO.  - If emesis occurs, stop tube feeds and check for residuals.  - If concerned for airway or respiratory status, contact MICU      Patient has been accepted by Hospital Medicine Team R, who will assume care of the patient upon arrival to the floor from the ICU. Please contact ICU team with any concerns prior to arrival. Please contact Hospital Medicine at [phone] or [phone] (please do NOT leave a voicemail) when patient arrives to the floor.    Ethan De Jesus MD  Hospital Medicine Staff

## 2022-10-30 NOTE — PLAN OF CARE
Problem: Adult Inpatient Plan of Care  Goal: Plan of Care Review  Outcome: Ongoing, Progressing  Goal: Optimal Comfort and Wellbeing  Outcome: Ongoing, Progressing     Problem: Glycemic Control Impaired (Sepsis/Septic Shock)  Goal: Blood Glucose Level Within Desired Range  Outcome: Ongoing, Progressing     Problem: Nutrition Impaired (Sepsis/Septic Shock)  Goal: Optimal Nutrition Intake  Outcome: Ongoing, Progressing

## 2022-10-30 NOTE — ASSESSMENT & PLAN NOTE
- Has bilateral neck gland swelling with tenderness,consulted ENT  - No surgical intervention indicated   - Adenopathy likely reactive in nature   - Recommend further workup if it does not resolve within next 2 weeks

## 2022-10-30 NOTE — PLAN OF CARE
Problem: SLP  Goal: SLP Goal  Description: Speech Language Pathology Goals  Goals expected to be met by 11/6/22    1. Pt will participate in ongoing assessment of swallow function to determine safest, least restrictive means of nutrition/hydration  2. Educate Pt and family on aspiration precautions and SLP POC    Outcome: Ongoing, Progressing     SLP Bedside Swallow Evaluation initiated. PO trials deferred 2/2 Pt with reduced vocal quality and increased lethargy as assessment progressed. Pt with coated lingual surface and palate. REC: continue NPO with temporary, alternative means nutrition/hydration and ST to continue to follow. RN and MD notified of findings.     10/30/2022

## 2022-10-30 NOTE — ASSESSMENT & PLAN NOTE
As of 10/26/22 strongly positive MPO Ab (in contrast to borderline positive PR3), consistent with clinical picture of microscopic polyangiitis with pulmonary, and renal involvement. Trialysis catheter placed overnight with plans for plasmapheresis early this AM. Pt tolerated procedure well despite multiple attempts to place line. Went for IR Renal biopsy this AM. Tolerated procedure well. Endorsing burning at szymanski insertion site. Continuing Azithromycin and Cefepime. Rheumatology planning to start rituximab and cyclophosphamide.      -- Started Plasmapheresis w/ plans for 7 treatments over 14-day period (Schedule: Wed, Thurs, Fri, Sun, Tues, Wed, Fri)  -- Rituximab and cyclophosphamide initiated 10/28. Next Rituximab 275 mg/m^2 due on November 3. Next cyclophosphamide 7.5 mg/kg due on November 10          - s/p 7 days of high dose steroids, now tapering per rheumatology's recommendations: IV solumedrol 24mg daily  - Protonix daily  - PJP prophylaxis with atovaquone via NG  - Nephrology consulted  - Rheumatology consulted

## 2022-10-30 NOTE — ASSESSMENT & PLAN NOTE
Multifocal pneumonia    This patient does have evidence of infective focus  My overall impression is sepsis. Vital signs were reviewed and noted in progress note.  2/4 SIRS: tachycardia and leukocytosis  On 5L NC, wean as tolerated  Antibiotics given-   Antibiotics (From admission, onward)    None        Cultures were taken-   Microbiology Results (last 7 days)     Procedure Component Value Units Date/Time    Respiratory Infection Panel (PCR), Nasopharyngeal [[id number]] Collected: 10/24/22 2035    Order Status: Completed Specimen: Nasopharyngeal Swab Updated: 10/24/22 2159     Respiratory Infection Panel Source NP Swab     Adenovirus Not Detected     Coronavirus 229E, Common Cold Virus Not Detected     Coronavirus HKU1, Common Cold Virus Not Detected     Coronavirus NL63, Common Cold Virus Not Detected     Coronavirus OC43, Common Cold Virus Not Detected     Comment: The Coronavirus strains detected in this test cause the common cold.  These strains are not the COVID-19 (novel Coronavirus)strain   associated with the respiratory disease outbreak.          SARS-CoV2 (COVID-19) Qualitative PCR Not Detected     Human Metapneumovirus Not Detected     Human Rhinovirus/Enterovirus Not Detected     Influenza A (subtypes H1, H1-2009,H3) Not Detected     Influenza B Not Detected     Parainfluenza Virus 1 Not Detected     Parainfluenza Virus 2 Not Detected     Parainfluenza Virus 3 Not Detected     Parainfluenza Virus 4 Not Detected     Respiratory Syncytial Virus Not Detected     Bordetella Parapertussis (QB3906) Not Detected     Bordetella pertussis (ptxP) Not Detected     Chlamydia pneumoniae Not Detected     Mycoplasma pneumoniae Not Detected    Narrative:      For all other respiratory sources, order [id number] -  Respiratory Viral Panel by PCR    Influenza A & B by Molecular [[id number]] Collected: 10/24/22 0616    Order Status: Completed Specimen: Nasopharyngeal Swab Updated: 10/24/22 0814     Influenza A, Molecular  Negative     Influenza B, Molecular Negative     Flu A & B Source NP        Latest lactate reviewed, they are-  No results for input(s): LACTATE in the last 72 hours.    Organ dysfunction indicated by Acute kidney injury and elevated troponin  Source- mulitfocal pneunoma    Source control Achieved by- vanc and cefepime

## 2022-10-31 ENCOUNTER — PATIENT MESSAGE (OUTPATIENT)
Dept: FAMILY MEDICINE | Facility: CLINIC | Age: 77
End: 2022-10-31
Payer: MEDICARE

## 2022-10-31 LAB
ALBUMIN SERPL BCP-MCNC: 3.1 G/DL (ref 3.5–5.2)
ALBUMIN SERPL BCP-MCNC: 3.2 G/DL (ref 3.5–5.2)
ALP SERPL-CCNC: 65 U/L (ref 55–135)
ALT SERPL W/O P-5'-P-CCNC: 20 U/L (ref 10–44)
ANION GAP SERPL CALC-SCNC: 13 MMOL/L (ref 8–16)
ANION GAP SERPL CALC-SCNC: 14 MMOL/L (ref 8–16)
ANISOCYTOSIS BLD QL SMEAR: SLIGHT
AST SERPL-CCNC: 22 U/L (ref 10–40)
BASO STIPL BLD QL SMEAR: ABNORMAL
BASOPHILS # BLD AUTO: 0.07 K/UL (ref 0–0.2)
BASOPHILS NFR BLD: 0.2 % (ref 0–1.9)
BILIRUB SERPL-MCNC: 0.9 MG/DL (ref 0.1–1)
BUN SERPL-MCNC: 128 MG/DL (ref 8–23)
BUN SERPL-MCNC: 132 MG/DL (ref 8–23)
CALCIUM SERPL-MCNC: 8.4 MG/DL (ref 8.7–10.5)
CALCIUM SERPL-MCNC: 8.4 MG/DL (ref 8.7–10.5)
CHLORIDE SERPL-SCNC: 108 MMOL/L (ref 95–110)
CHLORIDE SERPL-SCNC: 108 MMOL/L (ref 95–110)
CO2 SERPL-SCNC: 27 MMOL/L (ref 23–29)
CO2 SERPL-SCNC: 27 MMOL/L (ref 23–29)
CREAT SERPL-MCNC: 4.2 MG/DL (ref 0.5–1.4)
CREAT SERPL-MCNC: 4.2 MG/DL (ref 0.5–1.4)
DIFFERENTIAL METHOD: ABNORMAL
EOSINOPHIL # BLD AUTO: 0.2 K/UL (ref 0–0.5)
EOSINOPHIL NFR BLD: 0.5 % (ref 0–8)
ERYTHROCYTE [DISTWIDTH] IN BLOOD BY AUTOMATED COUNT: 14.9 % (ref 11.5–14.5)
EST. GFR  (NO RACE VARIABLE): 10.5 ML/MIN/1.73 M^2
EST. GFR  (NO RACE VARIABLE): 10.5 ML/MIN/1.73 M^2
GIANT PLATELETS BLD QL SMEAR: PRESENT
GLUCOSE SERPL-MCNC: 116 MG/DL (ref 70–110)
GLUCOSE SERPL-MCNC: 116 MG/DL (ref 70–110)
HCT VFR BLD AUTO: 28.8 % (ref 37–48.5)
HGB BLD-MCNC: 9.6 G/DL (ref 12–16)
HYPOCHROMIA BLD QL SMEAR: ABNORMAL
IMM GRANULOCYTES # BLD AUTO: 1.32 K/UL (ref 0–0.04)
IMM GRANULOCYTES NFR BLD AUTO: 4.2 % (ref 0–0.5)
LYMPHOCYTES # BLD AUTO: 1.7 K/UL (ref 1–4.8)
LYMPHOCYTES NFR BLD: 5.5 % (ref 18–48)
M TB IFN-G BLD-IMP: NEGATIVE
MAGNESIUM SERPL-MCNC: 2.1 MG/DL (ref 1.6–2.6)
MAGNESIUM SERPL-MCNC: 2.1 MG/DL (ref 1.6–2.6)
MCH RBC QN AUTO: 28.4 PG (ref 27–31)
MCHC RBC AUTO-ENTMCNC: 33.3 G/DL (ref 32–36)
MCV RBC AUTO: 85 FL (ref 82–98)
MONOCYTES # BLD AUTO: 1.8 K/UL (ref 0.3–1)
MONOCYTES NFR BLD: 5.8 % (ref 4–15)
NEUTROPHILS # BLD AUTO: 26.2 K/UL (ref 1.8–7.7)
NEUTROPHILS NFR BLD: 83.8 % (ref 38–73)
NRBC BLD-RTO: 1 /100 WBC
OVALOCYTES BLD QL SMEAR: ABNORMAL
PHOSPHATE SERPL-MCNC: 5.4 MG/DL (ref 2.7–4.5)
PHOSPHATE SERPL-MCNC: 5.5 MG/DL (ref 2.7–4.5)
PLATELET # BLD AUTO: 260 K/UL (ref 150–450)
PLATELET BLD QL SMEAR: ABNORMAL
PMV BLD AUTO: 11.7 FL (ref 9.2–12.9)
POCT GLUCOSE: 137 MG/DL (ref 70–110)
POCT GLUCOSE: 138 MG/DL (ref 70–110)
POCT GLUCOSE: 156 MG/DL (ref 70–110)
POCT GLUCOSE: 215 MG/DL (ref 70–110)
POIKILOCYTOSIS BLD QL SMEAR: SLIGHT
POLYCHROMASIA BLD QL SMEAR: ABNORMAL
POTASSIUM SERPL-SCNC: 3.5 MMOL/L (ref 3.5–5.1)
POTASSIUM SERPL-SCNC: 3.5 MMOL/L (ref 3.5–5.1)
PROT SERPL-MCNC: 5.4 G/DL (ref 6–8.4)
RBC # BLD AUTO: 3.38 M/UL (ref 4–5.4)
SODIUM SERPL-SCNC: 148 MMOL/L (ref 136–145)
SODIUM SERPL-SCNC: 149 MMOL/L (ref 136–145)
SPHEROCYTES BLD QL SMEAR: ABNORMAL
TARGETS BLD QL SMEAR: ABNORMAL
TOXIC GRANULES BLD QL SMEAR: PRESENT
WBC # BLD AUTO: 31.24 K/UL (ref 3.9–12.7)

## 2022-10-31 PROCEDURE — 83735 ASSAY OF MAGNESIUM: CPT | Mod: 91 | Performed by: STUDENT IN AN ORGANIZED HEALTH CARE EDUCATION/TRAINING PROGRAM

## 2022-10-31 PROCEDURE — C9113 INJ PANTOPRAZOLE SODIUM, VIA: HCPCS | Performed by: STUDENT IN AN ORGANIZED HEALTH CARE EDUCATION/TRAINING PROGRAM

## 2022-10-31 PROCEDURE — 99233 SBSQ HOSP IP/OBS HIGH 50: CPT | Mod: ,,, | Performed by: INTERNAL MEDICINE

## 2022-10-31 PROCEDURE — 84100 ASSAY OF PHOSPHORUS: CPT

## 2022-10-31 PROCEDURE — 99233 PR SUBSEQUENT HOSPITAL CARE,LEVL III: ICD-10-PCS | Mod: ,,, | Performed by: INTERNAL MEDICINE

## 2022-10-31 PROCEDURE — 99900035 HC TECH TIME PER 15 MIN (STAT)

## 2022-10-31 PROCEDURE — 94761 N-INVAS EAR/PLS OXIMETRY MLT: CPT

## 2022-10-31 PROCEDURE — 99233 SBSQ HOSP IP/OBS HIGH 50: CPT | Mod: ,,, | Performed by: STUDENT IN AN ORGANIZED HEALTH CARE EDUCATION/TRAINING PROGRAM

## 2022-10-31 PROCEDURE — 27201109 HC SYSTEM FECAL MANAGEMENT

## 2022-10-31 PROCEDURE — 92526 ORAL FUNCTION THERAPY: CPT

## 2022-10-31 PROCEDURE — 27000221 HC OXYGEN, UP TO 24 HOURS

## 2022-10-31 PROCEDURE — 97164 PT RE-EVAL EST PLAN CARE: CPT

## 2022-10-31 PROCEDURE — 80053 COMPREHEN METABOLIC PANEL: CPT

## 2022-10-31 PROCEDURE — 20600001 HC STEP DOWN PRIVATE ROOM

## 2022-10-31 PROCEDURE — 25000003 PHARM REV CODE 250: Performed by: HOSPITALIST

## 2022-10-31 PROCEDURE — 80069 RENAL FUNCTION PANEL: CPT | Performed by: STUDENT IN AN ORGANIZED HEALTH CARE EDUCATION/TRAINING PROGRAM

## 2022-10-31 PROCEDURE — 83735 ASSAY OF MAGNESIUM: CPT

## 2022-10-31 PROCEDURE — 97535 SELF CARE MNGMENT TRAINING: CPT

## 2022-10-31 PROCEDURE — 25000003 PHARM REV CODE 250: Performed by: STUDENT IN AN ORGANIZED HEALTH CARE EDUCATION/TRAINING PROGRAM

## 2022-10-31 PROCEDURE — 99233 PR SUBSEQUENT HOSPITAL CARE,LEVL III: ICD-10-PCS | Mod: ,,, | Performed by: STUDENT IN AN ORGANIZED HEALTH CARE EDUCATION/TRAINING PROGRAM

## 2022-10-31 PROCEDURE — 99232 SBSQ HOSP IP/OBS MODERATE 35: CPT | Mod: GC,,, | Performed by: INTERNAL MEDICINE

## 2022-10-31 PROCEDURE — 25000003 PHARM REV CODE 250

## 2022-10-31 PROCEDURE — 99232 PR SUBSEQUENT HOSPITAL CARE,LEVL II: ICD-10-PCS | Mod: GC,,, | Performed by: INTERNAL MEDICINE

## 2022-10-31 PROCEDURE — 85025 COMPLETE CBC W/AUTO DIFF WBC: CPT

## 2022-10-31 PROCEDURE — 97530 THERAPEUTIC ACTIVITIES: CPT

## 2022-10-31 PROCEDURE — 63600175 PHARM REV CODE 636 W HCPCS: Performed by: STUDENT IN AN ORGANIZED HEALTH CARE EDUCATION/TRAINING PROGRAM

## 2022-10-31 RX ORDER — CARVEDILOL 6.25 MG/1
6.25 TABLET ORAL 2 TIMES DAILY
Status: DISCONTINUED | OUTPATIENT
Start: 2022-10-31 | End: 2022-11-02

## 2022-10-31 RX ORDER — PANTOPRAZOLE SODIUM 40 MG/1
40 FOR SUSPENSION ORAL DAILY
Status: DISCONTINUED | OUTPATIENT
Start: 2022-11-01 | End: 2022-11-05

## 2022-10-31 RX ADMIN — METHYLPREDNISOLONE SODIUM SUCCINATE 24 MG: 40 INJECTION, POWDER, FOR SOLUTION INTRAMUSCULAR; INTRAVENOUS at 08:10

## 2022-10-31 RX ADMIN — ATOVAQUONE 1500 MG: 750 SUSPENSION ORAL at 08:10

## 2022-10-31 RX ADMIN — INSULIN ASPART 2 UNITS: 100 INJECTION, SOLUTION INTRAVENOUS; SUBCUTANEOUS at 08:10

## 2022-10-31 RX ADMIN — PANTOPRAZOLE SODIUM 40 MG: 40 INJECTION, POWDER, FOR SOLUTION INTRAVENOUS at 08:10

## 2022-10-31 RX ADMIN — LEVOTHYROXINE SODIUM 25 MCG: 25 TABLET ORAL at 06:10

## 2022-10-31 RX ADMIN — QUETIAPINE FUMARATE 25 MG: 25 TABLET ORAL at 08:10

## 2022-10-31 RX ADMIN — ONDANSETRON 8 MG: 8 TABLET, ORALLY DISINTEGRATING ORAL at 06:10

## 2022-10-31 RX ADMIN — CARVEDILOL 6.25 MG: 6.25 TABLET, FILM COATED ORAL at 08:10

## 2022-10-31 RX ADMIN — LISINOPRIL 40 MG: 20 TABLET ORAL at 08:10

## 2022-10-31 RX ADMIN — Medication 6 MG: at 08:10

## 2022-10-31 RX ADMIN — AMLODIPINE BESYLATE 10 MG: 10 TABLET ORAL at 08:10

## 2022-10-31 RX ADMIN — CLONIDINE HYDROCHLORIDE 0.2 MG: 0.1 TABLET ORAL at 01:10

## 2022-10-31 RX ADMIN — INSULIN ASPART 2 UNITS: 100 INJECTION, SOLUTION INTRAVENOUS; SUBCUTANEOUS at 01:10

## 2022-10-31 RX ADMIN — INSULIN ASPART 4 UNITS: 100 INJECTION, SOLUTION INTRAVENOUS; SUBCUTANEOUS at 05:10

## 2022-10-31 NOTE — ASSESSMENT & PLAN NOTE
Patient w/o CKD presenting with WILFREDO with rapidly increasing sCr (baseline sCr 1.0-1.2). New onset proteinuria/hematuria in last 30 days. .  DDx: ATN vs GN.       - Renal Bx done, f/u pathology  - High-dose steroids completed, tapering per rheumatology recs.  - Rituximab and cytoxan per rheum recs.  - Undergoing PLEX, see transfusion medicine recs  - Trend sCr  - Strict I&Os  - Avoid nephrotoxic agents  - Renally adjust medications  - Nephro and Rheumatology following

## 2022-10-31 NOTE — ASSESSMENT & PLAN NOTE
Microscopic polyangitis  Multifocal pneumonia  Hemoptysis  78 yo PMHx HTN, hypothyroid, HFpEF, OA admitted for fevers and respiratory symptoms, treated while on Hospital Medicine for multifocal PNA. Course complicated by GIB bleed (no EGD yet d/t PNA), hyponatremia, ATN. MICU consulted for rapidly increasing oxygen requirement (4L NC to BiPAP 15/10 50%), respiratory distress/work of breathing, somnolence. Nephrology consulted for ATN, concerns for possible nephritic disease as  behind ATN d/t acanthrocytes. Reports of scant hemoptysis by nursing staff 10/21, 10/22.     Imaging: CXR: Diffuse bilateral airspace infiltrates w/ c/f alveolar fillings; CT chest wc 10/17 with bl perihilar dense consolidations w/ peripheral patcy areas of GGO, BL PNA, less c/f cardiogenic pulmonary edema.  Labs: WBC uptrending 28.97, Hgb lowest 6.0 (s/p 2 u PRBC), continues to downtrend approx 1 point / day. sCr uptrending, (baseline 1.0-1.2). .        No results for input(s): PH, PCO2, PO2, HCO3, POCSATURATED, BE in the last 72 hours.  Etiology: Microscopic polyangitis likely. Concerns for possible PE as patient was not on thrombotic ppx s/o GIB. Incomplete I/Os charted, though reports of low UOP also renders pulmonary congestion edema as a possibility. Less c/f acute progression of multifocal PNA as adequate ABx coverage and no new fevers recently.     - US DVT BLE unremarkable  - Consideration for urgent BAL +/- repeat chest imaging (last done 10/17)  - Rheumatology consulted  -- Continue Rituximab, cyclophosphamide  -- IV solumedrol per Rheumatology  -- PJP prophylaxis  - Nephrology consulted  -- iHD nephrology  - Transfusion medicine  -- Plasmapheresis started 10/26, plans for 7 cycles  - Neuro checks  - Wean supplemental O2 as tolerated

## 2022-10-31 NOTE — PROGRESS NOTES
Pharmacist Intervention IV to PO Note    Kristin Goodman is a 77 y.o. female being treated with IV medication pantoprazole    Patient Data:    Vital Signs (Most Recent):  Temp: 98.7 °F (37.1 °C) (10/31/22 0749)  Pulse: 91 (10/31/22 0749)  Resp: 19 (10/31/22 0749)  BP: (!) 186/84 (10/31/22 0749)  SpO2: 100 % (10/31/22 0749)   Vital Signs (72h Range):  Temp:  [96.8 °F (36 °C)-98.7 °F (37.1 °C)]   Pulse:  []   Resp:  [14-41]   BP: (134-192)/(60-84)   SpO2:  [91 %-100 %]      CBC:  Recent Labs   Lab 10/29/22  0310 10/30/22  0426 10/31/22  0224   WBC 34.44* 46.38* 31.24*   RBC 3.03* 3.46* 3.38*   HGB 8.7* 9.8* 9.6*   HCT 26.5* 30.1* 28.8*    269 260   MCV 88 87 85   MCH 28.7 28.3 28.4   MCHC 32.8 32.6 33.3     CMP:     Recent Labs   Lab 10/29/22  0310 10/29/22  1330 10/30/22  0426 10/30/22  1424 10/30/22  2159 10/31/22  0224   *   < > 125*  127* 167* 143* 116*  116*   CALCIUM 7.3*   < > 8.7  8.8 8.2* 8.4* 8.4*  8.4*   ALBUMIN 2.4*   < > 2.9*  2.9* 3.1* 3.0* 3.2*  3.1*   PROT 5.1*  --  5.8*  --   --  5.4*      < > 146*  146* 146* 146* 148*  149*   K 3.5   < > 3.3*  3.3* 3.6 4.4 3.5  3.5   CO2 20*   < > 25  25 24 21* 27  27      < > 107  107 106 107 108  108   *   < > 124*  126* 126* 129* 132*  128*   CREATININE 4.8*   < > 4.4*  4.4* 4.2* 4.3* 4.2*  4.2*   ALKPHOS 86  --  89  --   --  65   ALT 52*  --  29  --   --  20   AST 58*  --  33  --   --  22   BILITOT 0.8  --  0.9  --   --  0.9    < > = values in this interval not displayed.       Dietary Orders:  Diet Orders            Tube Feedings/Formulas [facility]; 35; NG; Tube Feeding Bag: Tube Feeding (I) starting at 10/27 1402            Based on the following criteria, this patient qualifies for intravenous to oral conversion:  [x] The patients gastrointestinal tract is functioning (tolerating medications via oral or enteral route for 24 hours and tolerating food or enteral feeds for 24  hours).  [x] The patient is hemodynamically stable for 24 hours (heart rate <100 beats per minute, systolic blood pressure >99 mm Hg, and respiratory rate <20 breaths per minute).  [x] The patient shows clinical improvement (afebrile for at least 24 hours and white blood cell count downtrending or normalized). Additionally, the patient must be non-neutropenic (absolute neutrophil count >500 cells/mm3).  [x] For antimicrobials, the patient has received IV therapy for at least 24 hours.    IV pantoprazole will be changed to PO pantoprazole suspension    Pharmacist's Name: Lexx Tapia, PharmD, BCPS   Pharmacist's Extension: [phone]
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
## 2022-10-31 NOTE — PROGRESS NOTES
"[facility] ([place] [address])  Rheumatology  Progress Note    Patient Name: Kristin Goodman  MRN: [id number]  Admission Date: 10/17/2022  Hospital Length of Stay: 14 days  Code Status: Full Code   Attending Provider: Ethan De Jesus MD  Primary Care Physician: Lori Hernandez MD  Principal Problem: Microscopic polyangiitis    Subjective:     HPI: Patient is a 78 yo F with chronic diastolic heart failure, HTN, OA, who is admitted for respiratory failure. Rheumatology is consulted due to concern for vasculitis. Patient initially presented to the ED on 9/24/22 complaining of a week of cough followed by an episode of hemoptysis on 9/24 prompting the ED visit. They did a CTA which showed multifocal PNA. She was sent home with Levuin. She followed up with PCP on 10/4/22 and was feeling better. Then she presented to the ED again on 10/14 and on 10/17 complaining of dyspnea. She had fevers in the few days leading up to admission of 102. She endorsed weakness, productive cough, dyspnea, and loss of appetite. Pt initally at 88% O2 saturation and improved to 98% on 2L NC. She was admitted for IV antibiotics. CT chest with contrast on 10/17 showed evidence of interval progression of bilateral perihilar dense consolidation with peripheral patchy areas of ground-glass opacification nonspecific as to the etiology.  Bilateral pneumonia as well as inflammatory etiologies considered.  Cardiogenic pulmonary edema considered less likely given the pattern.    On 10/19/22 she started having melena. Hb dropped to 6. She got 2 units pRBCs. GI was consulted. They noted "Colonoscopy in 2020 showed internal hemorrhoids and mild sigmoid diverticulosis. EGD in 2012 showed gastritis, biopsies positive for H. Pylori." "We considered doing EGD now, but from a pulmonary standpoint I do not think she is stable enough with the current pneumonia, therefore would recommend that we just follow the patient, treat with a PPI in case there " "is any peptic ulcer disease."    On 10/21/22 ENT was consulted due to left sided otalgia the day prior which resolved. She also was complaining of cervical adenopathy and sore throat. They did not recommend surgical intervention and felt that adenopathy was likely reactive.     On 10/23/22 ID was consulted. They recommended checking respiratory infection panel and fungal markers.    On 10/23/22 Nephrology was consulted due to worsening creatinine. They noted, "Patient is a noted to have baseline creatinine of 1 as recent as 8/2022 but progressive worsening of creatinine in early October.  On admission patient with creatinine of 1.6 (10/17) with subsequent progression and creatinine of 3.0 at time of consultation (10/23).  Nephrology consulted for acute kidney injury." "Patient with ATN nephritic picture in urine sediment, prot/crea ratio pending. Had hematuria in recent past but in context with positive urine cultures. Now definitely more dysmorphic red cells that wbcs in the urine. 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    On 10/23/22, she was transferred to ICU due to desaturations and respiratory distress. Pulmonologist noted that her respiratory status is too tenuous for bronchoscopy. She was stabilized with non-invasive ventilation and nasal cannula oxygen.      Interval History: feeling better today. On 2 L NC. Still has feedings via NGT.    Current Facility-Administered Medications   Medication Frequency    0.9%  NaCl infusion (for blood administration) Q24H PRN    0.9%  NaCl infusion (for blood administration) Q24H PRN    acetaminophen tablet 650 mg Q4H PRN    albuterol-ipratropium 2.5 mg-0.5 mg/3 mL nebulizer solution 3 mL Q4H PRN    aluminum-magnesium hydroxide-simethicone 200-200-20 mg/5 mL suspension 30 mL QID PRN    amLODIPine tablet 10 mg Daily    atovaquone 750 mg/5 mL oral " liquid 1,500 mg Daily    bisacodyL suppository 10 mg Daily PRN    carvediloL tablet 6.25 mg BID    cloNIDine tablet 0.2 mg Q4H PRN    dextrose 10% bolus 125 mL PRN    dextrose 10% bolus 250 mL PRN    glucagon (human recombinant) injection 1 mg PRN    glucose chewable tablet 16 g PRN    glucose chewable tablet 24 g PRN    hydrALAZINE injection 10 mg Q8H PRN    insulin aspart U-100 pen 1-10 Units Q6H PRN    levothyroxine tablet 25 mcg Before breakfast    lisinopriL tablet 40 mg Daily    melatonin tablet 6 mg Nightly PRN    methocarbamoL tablet 500 mg TID PRN    methylPREDNISolone sodium succinate injection 24 mg Daily    naloxone 0.4 mg/mL injection 0.02 mg PRN    ondansetron disintegrating tablet 8 mg Q8H PRN    [START ON 11/1/2022] pantoprazole suspension 40 mg Daily    prochlorperazine injection Soln 5 mg Q6H PRN    QUEtiapine tablet 25 mg QHS    simethicone chewable tablet 80 mg QID PRN    sodium chloride 0.9% 250 mL flush bag 1 time in Clinic/HOD    sodium chloride 0.9% flush 10 mL PRN    sodium chloride 0.9% flush 5 mL PRN     Facility-Administered Medications Ordered in Other Encounters   Medication Frequency    celecoxib capsule 400 mg Once    fentaNYL 50 mcg/mL injection  mcg PRN    LIDOcaine (PF) 10 mg/ml (1%) injection 10 mg Once PRN    LIDOcaine (PF) 10 mg/ml (1%) injection 10 mg Once    midazolam (VERSED) 1 mg/mL injection 0.5-4 mg PRN    ropivacaine 0.2% [facility] PainPRO Pump infusion 500 ML Continuous     Objective:     Vital Signs (Most Recent):  Temp: 98.5 °F (36.9 °C) (10/31/22 1118)  Pulse: 83 (10/31/22 1118)  Resp: 15 (10/31/22 1118)  BP: (!) 168/77 (10/31/22 1156)  SpO2: 98 % (10/31/22 1118)  O2 Device (Oxygen Therapy): nasal cannula (10/31/22 9866)   Vital Signs (24h Range):  Temp:  [96.8 °F (36 °C)-98.7 °F (37.1 °C)] 98.5 °F (36.9 °C)  Pulse:  [70-96] 83  Resp:  [15-23] 15  SpO2:  [91 %-100 %] 98 %  BP: (168-186)/(77-84) 168/77     Weight: 63.5 kg (139 lb 15.9 oz) (10/27/22 1334)  Body mass  index is 26.45 kg/m².  Body surface area is 1.65 meters squared.      Intake/Output Summary (Last 24 hours) at 10/31/2022 1213  Last data filed at 10/31/2022 0830  Gross per 24 hour   Intake 7447 ml   Output 465 ml   Net 6982 ml       Physical Exam   Constitutional: She is oriented to person, place, and time. No distress.   HENT:   Head: Normocephalic and atraumatic.   Nose: No rhinorrhea or nasal congestion.   Mouth/Throat: Oropharynx is clear.   No tenderness to palpation of sinuses. NGT in place   Ears: No tenderness to palpation     Eyes: Pupils are equal, round, and reactive to light.   Cardiovascular: Normal rate and regular rhythm.   No murmur heard.  Pulmonary/Chest: Effort normal and breath sounds normal. No respiratory distress. She has no wheezes.   Abdominal: Soft. Bowel sounds are normal. There is no abdominal tenderness.   Musculoskeletal:         General: No deformity. Normal range of motion.      Cervical back: Normal range of motion.   Neurological: She is alert and oriented to person, place, and time.   Skin: Skin is warm and dry.   Psychiatric: Affect and judgment normal.     Significant Labs:  All pertinent lab results from the last 24 hours have been reviewed.    Significant Imaging:  Imaging results within the past 24 hours have been reviewed.    Assessment/Plan:     Acute hypoxemic respiratory failure  Patient is a 78 yo F with chronic diastolic heart failure, HTN, OA, who is admitted for respiratory failure. Rheumatology is consulted due to concern for vasculitis. Patient presented with cough and hemoptysis since 9/24/22. She was admitted due to dyspnea and hypoxia on 10/17. She has had Hb drop to 6 this admission requiring blood transfusions. Reviewed her chest imaging which shows progressive disease from 10/14 until now which can be concerning for DAH. This might also explain the Hb drop. No bronch planned for now due to her tenuous respiratory status. GI defers invasive workup for now  because she is not stable enough. She has had increasing creatinine from baseline of 1 to 3.0 on 10/23/22. Nephrology concerned for ATN nephritic picture in urine sediment. Pending kidney biopsy result. BVAS 1/33    UA: 1+ blood, 88 RBCs, 18 WBCs  UPCR 1.54  RF and CCP negative  MITUL+ 1:2560 homogenous, +dsDNA, complements normal  PR3 slightly elevated at 1.2 (reference positive is 1.0)  MPO elevated at 132  C-ANCA+ 1:80  P-ANCA-  GBM antibodies negative  Quantiferon indeterminate  T-Spot Negative    Pending: cryoglobulins    Treating empirically for ANCA vasculitis while awaiting kidney biopsy results:  - s/p IV Solumedrol 1000 mg x3 doses. Now following PEXIVAS steroid taper. Currently on IV Solumedrol 24 mg daily.  - PLEX 10/26, 10/27, 10/29. Next scheduled for 10/30. Total 7 treatments planned.  - Got SLED 10/27. Bedside HD 10/30  - Rituximab 375 mg/m^2 (600 mg) on 10/27 (every 7 day dose 1 of 4)  - Cyclophosphamide 7.5 mg/kg on 10/27 (every 14 day dose 1 of 2)    Recommendations:  Continue steroid taper per PEXIVAS trial as in the chart below. Currently IV Solu-Medrol 24 mg daily (prednisone 30 mg daily equivalent). Started on 10/30  Atovaquone 1500 mg and Protonix daily while on high dose steroids.  Next Rituximab 275 mg/m^2 due on November 3  Next cyclophosphamide 7.5 mg/kg due on November 10  Monitor CBC and CMP in 10-14 days after giving chemotherapy  Follow up kidney biopsy              Thank you for this consult. These are preliminary recommendations. Please follow attending recommendations. Please message with any questions or concerns.         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 avacopan  Atovaquone 1500 mg daily  Pantoprazole  *Will need Evusheld given rituximab    Jason Woods MD  Rheumatology  [phone]

## 2022-10-31 NOTE — SUBJECTIVE & OBJECTIVE
Interval History:   Unable tolerate full session of hemodialysis last night due to tachycardia.    Patient notes dysuria this morning.  No other complaints.  Family at bedside as patient more alert over the last couple days.  Tolerating tube feeds and plexus morning.      Review of Systems   Constitutional:  Positive for appetite change and fatigue. Negative for activity change, chills, diaphoresis and fever.   HENT:  Negative for rhinorrhea and sore throat.    Respiratory:  Negative for cough, chest tightness and shortness of breath.    Cardiovascular:  Negative for chest pain and palpitations.   Gastrointestinal:  Negative for abdominal pain, constipation, diarrhea and nausea.   Endocrine: Negative for cold intolerance.   Genitourinary:  Positive for dysuria. Negative for decreased urine volume.   Musculoskeletal:  Negative for arthralgias and myalgias.   Skin:  Negative for rash and wound.   Neurological:  Positive for weakness. Negative for dizziness, numbness and headaches.   Psychiatric/Behavioral:  Negative for agitation, behavioral problems and confusion.    Objective:     Vital Signs (Most Recent):  Temp: 97.4 °F (36.3 °C) (10/30/22 1650)  Pulse: 81 (10/30/22 2042)  Resp: 17 (10/30/22 1650)  BP: (!) 175/77 (10/30/22 1650)  SpO2: (!) 92 % (10/30/22 1650)   Vital Signs (24h Range):  Temp:  [96.8 °F (36 °C)-98.4 °F (36.9 °C)] 97.4 °F (36.3 °C)  Pulse:  [] 81  Resp:  [14-23] 17  SpO2:  [91 %-98 %] 92 %  BP: (134-192)/(60-81) 175/77     Weight: 63.5 kg (139 lb 15.9 oz)  Body mass index is 26.45 kg/m².    Intake/Output Summary (Last 24 hours) at 10/30/2022 2142  Last data filed at 10/30/2022 0559  Gross per 24 hour   Intake --   Output 569 ml   Net -569 ml      Physical Exam  Vitals and nursing note reviewed.   Constitutional:       General: She is not in acute distress.     Appearance: She is well-developed. She is ill-appearing. She is not toxic-appearing.      Comments: Soft-spoken    HENT:      Head:  Normocephalic and atraumatic.      Nose:      Comments: NG tube     Mouth/Throat:      Mouth: Mucous membranes are dry.   Eyes:      Conjunctiva/sclera: Conjunctivae normal.   Cardiovascular:      Rate and Rhythm: Normal rate and regular rhythm.      Heart sounds: Normal heart sounds.   Pulmonary:      Effort: Pulmonary effort is normal. No respiratory distress.      Breath sounds: No wheezing or rales.   Abdominal:      General: Bowel sounds are normal. There is no distension.      Palpations: Abdomen is soft.      Tenderness: There is no abdominal tenderness.   Musculoskeletal:         General: No tenderness. Normal range of motion.      Cervical back: Neck supple.      Right lower leg: No edema.      Left lower leg: No edema.   Lymphadenopathy:      Cervical: No cervical adenopathy.   Skin:     General: Skin is warm and dry.      Findings: No rash.   Neurological:      General: No focal deficit present.      Mental Status: She is alert and oriented to person, place, and time.   Psychiatric:         Mood and Affect: Mood normal.       Significant Labs: CBC:   Recent Labs   Lab 10/29/22  0310 10/30/22  0426   WBC 34.44* 46.38*   HGB 8.7* 9.8*   HCT 26.5* 30.1*    269     CMP:   Recent Labs   Lab 10/29/22  0310 10/29/22  1330 10/29/22  2130 10/30/22  0426 10/30/22  1424      < > 146* 146*  146* 146*   K 3.5   < > 4.9 3.3*  3.3* 3.6      < > 108 107  107 106   CO2 20*   < > 20* 25  25 24   *   < > 130* 125*  127* 167*   *   < > 135* 124*  126* 126*   CREATININE 4.8*   < > 4.9* 4.4*  4.4* 4.2*   CALCIUM 7.3*   < > 9.1 8.7  8.8 8.2*   PROT 5.1*  --   --  5.8*  --    ALBUMIN 2.4*   < > 2.9* 2.9*  2.9* 3.1*   BILITOT 0.8  --   --  0.9  --    ALKPHOS 86  --   --  89  --    AST 58*  --   --  33  --    ALT 52*  --   --  29  --    ANIONGAP 15   < > 18* 14  14 16    < > = values in this interval not displayed.       Significant Imaging: I have reviewed all pertinent imaging  results/findings within the past 24 hours.

## 2022-10-31 NOTE — PLAN OF CARE
PT eval complete, plan of care established    10/31/2022    Problem: Physical Therapy  Goal: Physical Therapy Goal  Description: Goals to be met by: 2022     Patient will increase functional independence with mobility by performin. Supine to sit with contact guard assistance  2. Sit to supine with contact guard assistance  3. Sit to stand transfer with minimum assistance  4. Gait  x 40 feet with minimum assistance using LRAD as needed  5. Lower extremity exercise program x10 reps per handout, with independence   Outcome: Ongoing, Progressing

## 2022-10-31 NOTE — PROGRESS NOTES
[facility] ([place] [address])  Nephrology  Progress Note    Patient Name: Kristin Goodman  MRN: [id number]  Admission Date: 10/17/2022  Hospital Length of Stay: 14 days  Attending Provider: Ethan De Jesus MD   Primary Care Physician: Lori Hernandez MD  Principal Problem:Microscopic polyangiitis    Subjective:     HPI: Kristin Goodman is a 77 year old female with hypertension, hypothyroidism, HFpEF who presents for cough, shortness of breath, and weakness.  Patient initially presented to ER on 09/24 with hemoptysis and imaging concerning for multilobar pneumonia at which time she was discharged on a 10 day course of levofloxacin.  Patient initially had some improvement but began having worsening symptoms on 10/14.  Patient describes multiple fevers and progressive shortness of breath.  She continues to have intermittent hemoptysis.  Patient with worsening leukocytosis and limited clinical improvement despite broad-spectrum antibiotics.  She remains on cefepime.  Patient is a noted to have baseline creatinine of 1 as recent as 8/2022 but progressive worsening of creatinine in early October.  On admission patient with creatinine of 1.6 (10/17) with subsequent progression and creatinine of 3.0 at time of consultation (10/23).  Nephrology consulted for acute kidney injury.      Interval History: NAEON. Patient well appearing this morning, awake and alert and responding to questions. Reports improved breathing, no chest pain, worsening LE swelling.     Review of patient's allergies indicates:   Allergen Reactions    Ampicillin     Peaches [peach (prunus persica)] Other (See Comments)     Pt unable to state type of reaction. Information obtained from daughter who states she was informed of allergy from patient.    Penicillins      Other reaction(s): Hives    Sulfa (sulfonamide antibiotics) Rash and Hives     Current Facility-Administered Medications   Medication Frequency    0.9%  NaCl infusion (for blood  administration) Q24H PRN    0.9%  NaCl infusion (for blood administration) Q24H PRN    acetaminophen tablet 650 mg Q4H PRN    albuterol-ipratropium 2.5 mg-0.5 mg/3 mL nebulizer solution 3 mL Q4H PRN    aluminum-magnesium hydroxide-simethicone 200-200-20 mg/5 mL suspension 30 mL QID PRN    amLODIPine tablet 10 mg Daily    atovaquone 750 mg/5 mL oral liquid 1,500 mg Daily    bisacodyL suppository 10 mg Daily PRN    carvediloL tablet 6.25 mg BID    cloNIDine tablet 0.2 mg Q4H PRN    dextrose 10% bolus 125 mL PRN    dextrose 10% bolus 250 mL PRN    glucagon (human recombinant) injection 1 mg PRN    glucose chewable tablet 16 g PRN    glucose chewable tablet 24 g PRN    hydrALAZINE injection 10 mg Q8H PRN    insulin aspart U-100 pen 1-10 Units Q6H PRN    levothyroxine tablet 25 mcg Before breakfast    lisinopriL tablet 40 mg Daily    melatonin tablet 6 mg Nightly PRN    methocarbamoL tablet 500 mg TID PRN    methylPREDNISolone sodium succinate injection 24 mg Daily    naloxone 0.4 mg/mL injection 0.02 mg PRN    ondansetron disintegrating tablet 8 mg Q8H PRN    [START ON 11/1/2022] pantoprazole suspension 40 mg Daily    prochlorperazine injection Soln 5 mg Q6H PRN    QUEtiapine tablet 25 mg QHS    simethicone chewable tablet 80 mg QID PRN    sodium chloride 0.9% 250 mL flush bag 1 time in Clinic/HOD    sodium chloride 0.9% flush 10 mL PRN    sodium chloride 0.9% flush 5 mL PRN     Facility-Administered Medications Ordered in Other Encounters   Medication Frequency    celecoxib capsule 400 mg Once    fentaNYL 50 mcg/mL injection  mcg PRN    LIDOcaine (PF) 10 mg/ml (1%) injection 10 mg Once PRN    LIDOcaine (PF) 10 mg/ml (1%) injection 10 mg Once    midazolam (VERSED) 1 mg/mL injection 0.5-4 mg PRN    ropivacaine 0.2% [facility] PainPRO Pump infusion 500 ML Continuous       Objective:     Vital Signs (Most Recent):  Temp: 98.5 °F (36.9 °C) (10/31/22 1118)  Pulse: 83 (10/31/22  1118)  Resp: 15 (10/31/22 1118)  BP: (!) 168/77 (10/31/22 1156)  SpO2: 98 % (10/31/22 1118)  O2 Device (Oxygen Therapy): nasal cannula (10/31/22 0750)   Vital Signs (24h Range):  Temp:  [96.8 °F (36 °C)-98.7 °F (37.1 °C)] 98.5 °F (36.9 °C)  Pulse:  [70-96] 83  Resp:  [15-23] 15  SpO2:  [91 %-100 %] 98 %  BP: (168-186)/(77-84) 168/77     Weight: 63.5 kg (139 lb 15.9 oz) (10/27/22 1334)  Body mass index is 26.45 kg/m².  Body surface area is 1.65 meters squared.    I/O last 3 completed shifts:  In: 7307 [Blood:6131; NG/GT:1176]  Out: 1019 [Urine:850; Other:69; Stool:100]    Physical Exam  Vitals and nursing note reviewed.   Constitutional:       General: She is not in acute distress.     Appearance: She is well-developed. She is ill-appearing. She is not toxic-appearing.      Comments: Patient somnolent   HENT:      Head: Normocephalic and atraumatic.      Mouth/Throat:      Mouth: Mucous membranes are dry.   Eyes:      Conjunctiva/sclera: Conjunctivae normal.   Cardiovascular:      Rate and Rhythm: Normal rate and regular rhythm.      Heart sounds: Normal heart sounds.   Pulmonary:      Effort: Pulmonary effort is normal. No respiratory distress.      Breath sounds: No wheezing or rales.      Comments: Coarse breath sounds bilaterally  Abdominal:      General: Bowel sounds are normal. There is no distension.      Palpations: Abdomen is soft.      Tenderness: There is no abdominal tenderness.   Musculoskeletal:         General: No tenderness. Normal range of motion.      Cervical back: Normal range of motion and neck supple.      Right lower leg: No edema.      Left lower leg: No edema.   Lymphadenopathy:      Cervical: No cervical adenopathy.   Skin:     General: Skin is warm and dry.      Capillary Refill: Capillary refill takes less than 2 seconds.      Findings: No rash.   Neurological:      General: No focal deficit present.      Mental Status: She is alert and oriented to person, place, and time.        Significant Labs:  CBC:   Recent Labs   Lab 10/31/22  0224   WBC 31.24*   RBC 3.38*   HGB 9.6*   HCT 28.8*      MCV 85   MCH 28.4   MCHC 33.3       CMP:   Recent Labs   Lab 10/31/22  0224   *  116*   CALCIUM 8.4*  8.4*   ALBUMIN 3.2*  3.1*   PROT 5.4*   *  149*   K 3.5  3.5   CO2 27  27     108   *  128*   CREATININE 4.2*  4.2*   ALKPHOS 65   ALT 20   AST 22   BILITOT 0.9       All labs within the past 24 hours have been reviewed.     Significant Imaging:       Assessment/Plan:     Acute renal failure superimposed on stage 3 chronic kidney disease  Patient with baseline creatinine of 1 as recent as August 2022 with progressive worsening beginning in early October 1.3 (10/13)->1.6 (10/17)->3.0 (10/23) despite IV fluids.  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  Patient noted to have new onset proteinuria and hematuria over the last 1 month.  Additionally, would consider ATN in the setting of suspected sepsis from multifocal pneumonia.     Concerns for glomerulonephritis given patient's current clinical picture. Initial urine microscopy demonstrating muddy brown casts with likely acanthocytes noted as well. MITUL positive, proteinase 3 ab weakly positive, MPO strongly positive. Patient s/p high-dose IV solumedrol x3 doses, now on Solumedrol 50mg qd. Underwent kidney bx 10/27. Rheumatology consulted, and patient started on Plex, Ritux/cytoxan. Patient has received 2 rounds of Plex, as well as 1 dose of Ritux and cytoxan on 10/27. Given continually worsening renal function and uremic encephalopathy, patient underwent SLED without complication on 10/27. Transferred to floor on 10/29. Unable to tolerate HD on 10/29 due to tachycardia and anxiety. However, no urgent need for dialysis since that time. Significantly improved mentation on 10/31.     Recommendations:  - no need for RRT today (10/31), will  re-evaluate tomorrow for HD  - s/p Plex #4 10/30  - f/u kidney bx results   - continue plex, Ritux/cytoxan per rheum (next dose of ritux and cytoxan on 11/3)  - continue steroid taper  - strict intake and output  - renally dose medications and avoid nephrotoxins when possible  - replete electrolytes as appropriate        Thank you for your consult. I will follow-up with patient. Please contact us if you have any additional questions.    Bipin Frias MD  Nephrology  [facility] - Intensive Care ([place]

## 2022-10-31 NOTE — PROGRESS NOTES
[facility] ([address] Medicine  Progress Note    Patient Name: Kristin Goodman  MRN: [id number]  Patient Class: IP- Inpatient   Admission Date: 10/17/2022  Length of Stay: 13 days  Attending Physician: Ethan De Jesus MD  Primary Care Provider: Lori Hernandez MD        Subjective:     Principal Problem:Microscopic polyangiitis        HPI:  Kristin Goodman is a 77 y.o. female with a past medical history of HTN, hypothyroidism, HFpEF, and osteoarthritis of the arm who has presented to the ED for cough, SOB, and weakness. Daughter is present at the bedside. Patient presented to the ED on 9/24 with hemoptysis, CT and CXR showed high suspicion for multilobar pneumonia. Patient was discharged with a 10-day course of levofloxacin and an albuterol inhaler. Patient followed up with her PCP on 10/4 with slight improvement in symptoms and went back to work. Patient continued to have worsening symptoms and presented to the ED again on 10/14 for evaluation and no interventions were done. Patient endorses fevers over the last few days up to 102.4 F with progressive SOB. She endorses hemoptysis with moderate amount of blood, generalized weakness, productive cough, SOB, and loss of appetite. Denies chest pain, nausea, vomiting, abdominal pain, or urinary changes.    ED: hypertensive up to 219/93 and tachycardic up to 117. Oxygen saturation on 92% on RA, placed on 5L NC with sats >97%. CBC remarkable for leukocytosis of 16.03 and Hb 7.7. K 3.3. Cr 1.6, baseline ~1.0. . Troponin 0.061. COVID and flu negative. EKG NSR. CT chest with contrast pending at time of admission. Given home amlodipine and lisinopril. Given 500mL NS, IV azithromycin, cefepime and vanc.       Overview/Hospital Course:  Kristin Goodman is a 77 y.o. female with a past medical history of HTN, hypothyroidism, HFpEF, and osteoarthritis of the arm who has presented to the ED for cough, SOB, and weakness. Daughter is present at the  bedside. Patient presented to the ED on 9/24 with hemoptysis, CT and CXR showed high suspicion for multilobar pneumonia. Patient was discharged with a 10-day course of levofloxacin and an albuterol inhaler. Patient followed up with her PCP on 10/4 with slight improvement in symptoms and went back to work. Patient continued to have worsening symptoms and presented to the ED again on 10/14 for evaluation and no interventions were done. Patient endorses fevers over the last few days up to 102.4 F with progressive SOB. She endorses hemoptysis with moderate amount of blood, generalized weakness, productive cough, SOB, and loss of appetite. Denies chest pain, nausea, vomiting, abdominal pain, or urinary changes.CT chest show extensive pneumonia,patient has been  started on broad spectrum IV Abx with cefepime and vancomycin,cultures are pending,fever is resolved,elevated BP is better controlled.  Starting having melena,HH is dropped,transfused 2 PRBC,HH is improved,started on Protonix gtt and consulted GI.per GI not candidate for EGD at this time,duo to pneumonia and hypoxia,will be monitored.  Hyponatremia minimally impacted by IV hydration so fluids discontinued.    Has bilateral neck gland swelling with tenderness,consulted ENT.  WBCs continue to increase to 28 with intermittent hemoptysis.  Renal function has continued to worsen with an elevated creatinine of 3.0.  Nephrology is consulted for further evaluation and treatment recommendations.  Infectious diseases consult as the patient has had no change in elevation of white blood cell is despite a week of IV antibiotic therapy.  The patient remains afebrile.      Interval History:   Unable tolerate full session of hemodialysis last night due to tachycardia.    Patient notes dysuria this morning.  No other complaints.  Family at bedside as patient more alert over the last couple days.  Tolerating tube feeds and plexus morning.      Review of Systems   Constitutional:   Positive for appetite change and fatigue. Negative for activity change, chills, diaphoresis and fever.   HENT:  Negative for rhinorrhea and sore throat.    Respiratory:  Negative for cough, chest tightness and shortness of breath.    Cardiovascular:  Negative for chest pain and palpitations.   Gastrointestinal:  Negative for abdominal pain, constipation, diarrhea and nausea.   Endocrine: Negative for cold intolerance.   Genitourinary:  Positive for dysuria. Negative for decreased urine volume.   Musculoskeletal:  Negative for arthralgias and myalgias.   Skin:  Negative for rash and wound.   Neurological:  Positive for weakness. Negative for dizziness, numbness and headaches.   Psychiatric/Behavioral:  Negative for agitation, behavioral problems and confusion.    Objective:     Vital Signs (Most Recent):  Temp: 97.4 °F (36.3 °C) (10/30/22 1650)  Pulse: 81 (10/30/22 2042)  Resp: 17 (10/30/22 1650)  BP: (!) 175/77 (10/30/22 1650)  SpO2: (!) 92 % (10/30/22 1650)   Vital Signs (24h Range):  Temp:  [96.8 °F (36 °C)-98.4 °F (36.9 °C)] 97.4 °F (36.3 °C)  Pulse:  [] 81  Resp:  [14-23] 17  SpO2:  [91 %-98 %] 92 %  BP: (134-192)/(60-81) 175/77     Weight: 63.5 kg (139 lb 15.9 oz)  Body mass index is 26.45 kg/m².    Intake/Output Summary (Last 24 hours) at 10/30/2022 2142  Last data filed at 10/30/2022 0559  Gross per 24 hour   Intake --   Output 569 ml   Net -569 ml      Physical Exam  Vitals and nursing note reviewed.   Constitutional:       General: She is not in acute distress.     Appearance: She is well-developed. She is ill-appearing. She is not toxic-appearing.      Comments: Soft-spoken    HENT:      Head: Normocephalic and atraumatic.      Nose:      Comments: NG tube     Mouth/Throat:      Mouth: Mucous membranes are dry.   Eyes:      Conjunctiva/sclera: Conjunctivae normal.   Cardiovascular:      Rate and Rhythm: Normal rate and regular rhythm.      Heart sounds: Normal heart sounds.   Pulmonary:      Effort:  Pulmonary effort is normal. No respiratory distress.      Breath sounds: No wheezing or rales.   Abdominal:      General: Bowel sounds are normal. There is no distension.      Palpations: Abdomen is soft.      Tenderness: There is no abdominal tenderness.   Musculoskeletal:         General: No tenderness. Normal range of motion.      Cervical back: Neck supple.      Right lower leg: No edema.      Left lower leg: No edema.   Lymphadenopathy:      Cervical: No cervical adenopathy.   Skin:     General: Skin is warm and dry.      Findings: No rash.   Neurological:      General: No focal deficit present.      Mental Status: She is alert and oriented to person, place, and time.   Psychiatric:         Mood and Affect: Mood normal.       Significant Labs: CBC:   Recent Labs   Lab 10/29/22  0310 10/30/22  0426   WBC 34.44* 46.38*   HGB 8.7* 9.8*   HCT 26.5* 30.1*    269     CMP:   Recent Labs   Lab 10/29/22  0310 10/29/22  1330 10/29/22  2130 10/30/22  0426 10/30/22  1424      < > 146* 146*  146* 146*   K 3.5   < > 4.9 3.3*  3.3* 3.6      < > 108 107  107 106   CO2 20*   < > 20* 25  25 24   *   < > 130* 125*  127* 167*   *   < > 135* 124*  126* 126*   CREATININE 4.8*   < > 4.9* 4.4*  4.4* 4.2*   CALCIUM 7.3*   < > 9.1 8.7  8.8 8.2*   PROT 5.1*  --   --  5.8*  --    ALBUMIN 2.4*   < > 2.9* 2.9*  2.9* 3.1*   BILITOT 0.8  --   --  0.9  --    ALKPHOS 86  --   --  89  --    AST 58*  --   --  33  --    ALT 52*  --   --  29  --    ANIONGAP 15   < > 18* 14  14 16    < > = values in this interval not displayed.       Significant Imaging: I have reviewed all pertinent imaging results/findings within the past 24 hours.      Assessment/Plan:      * Microscopic polyangiitis  As of 10/26/22 strongly positive MPO Ab (in contrast to borderline positive PR3), consistent with clinical picture of microscopic polyangiitis with pulmonary, and renal involvement. Trialysis catheter placed overnight with  plans for plasmapheresis early this AM. Pt tolerated procedure well despite multiple attempts to place line. Went for IR Renal biopsy this AM. Tolerated procedure well. Endorsing burning at szymanski insertion site. Continuing Azithromycin and Cefepime. Rheumatology planning to start rituximab and cyclophosphamide.      -- Started Plasmapheresis w/ plans for 7 treatments over 14-day period (Schedule: Wed, Thurs, Fri, Sun, Tues, Wed, Fri)  -- Rituximab and cyclophosphamide initiated 10/28. Next Rituximab 275 mg/m^2 due on November 3. Next cyclophosphamide 7.5 mg/kg due on November 10          - s/p 7 days of high dose steroids, now tapering per rheumatology's recommendations: IV solumedrol 24mg daily  - Protonix daily  - PJP prophylaxis with atovaquone via NG  - Nephrology consulted  - Rheumatology consulted      Acute hypoxemic respiratory failure  Microscopic polyangitis  Multifocal pneumonia  Hemoptysis  76 yo PMHx HTN, hypothyroid, HFpEF, OA admitted for fevers and respiratory symptoms, treated while on Hospital Medicine for multifocal PNA. Course complicated by GIB bleed (no EGD yet d/t PNA), hyponatremia, ATN. MICU consulted for rapidly increasing oxygen requirement (4L NC to BiPAP 15/10 50%), respiratory distress/work of breathing, somnolence. Nephrology consulted for ATN, concerns for possible nephritic disease as  behind ATN d/t acanthrocytes. Reports of scant hemoptysis by nursing staff 10/21, 10/22.     Imaging: CXR: Diffuse bilateral airspace infiltrates w/ c/f alveolar fillings; CT chest wc 10/17 with bl perihilar dense consolidations w/ peripheral patcy areas of GGO, BL PNA, less c/f cardiogenic pulmonary edema.  Labs: WBC uptrending 28.97, Hgb lowest 6.0 (s/p 2 u PRBC), continues to downtrend approx 1 point / day. sCr uptrending, (baseline 1.0-1.2). .        No results for input(s): PH, PCO2, PO2, HCO3, POCSATURATED, BE in the last 72 hours.  Etiology: Microscopic polyangitis likely. Concerns  for possible PE as patient was not on thrombotic ppx s/o GIB. Incomplete I/Os charted, though reports of low UOP also renders pulmonary congestion edema as a possibility. Less c/f acute progression of multifocal PNA as adequate ABx coverage and no new fevers recently.     - US DVT BLE unremarkable  - Consideration for urgent BAL +/- repeat chest imaging (last done 10/17)  - Rheumatology consulted  -- Continue Rituximab, cyclophosphamide  -- IV solumedrol per Rheumatology  -- PJP prophylaxis  - Nephrology consulted  -- iHD nephrology  - Transfusion medicine  -- Plasmapheresis started 10/26, plans for 7 more cycles over 14 days.  - Neuro checks  - Wean supplemental O2 as tolerated    Acute renal failure superimposed on stage 3 chronic kidney disease  Patient w/o CKD presenting with WILFREDO with rapidly increasing sCr (baseline sCr 1.0-1.2). New onset proteinuria/hematuria in last 30 days. .  DDx: ATN vs GN.       - Renal Bx done, f/u pathology  - High-dose steroids completed, tapering per rheumatology recs.  - Rituximab and cytoxan per rheum recs.  - Undergoing PLEX, see rheum note  - Trend sCr  - Strict I&Os  - Avoid nephrotoxic agents  - Renally adjust medications  - Nephro and Rheumatology following    Chronic diastolic heart failure  Pulmonary Hypertension   TTE (10/2022) EF 65%, G1DD  Home meds: Lisinopril, Toprol     - Volume control plan per Acute Hypoxemic Respiratory Failure A/P  - Daily weights (standing if tolerated)  - Strict I/Os  - Fluid restriction 1.5 day  - Cardiac diet w/ 2 g Na restriction      Dysuria  - UA 10/30 not consistent with urinary tract infection  - Able to take Pyridium due to renal function      Moderate malnutrition  Nutrition consulted. Most recent weight and BMI monitored-          Malnutrition (Moderate to Severe)  Weight Loss (Malnutrition): 7.5% in 3 months              Measurements:  Wt Readings from Last 1 Encounters:   10/27/22 63.5 kg (139 lb 15.9 oz)   Body mass index is  26.45 kg/m².    Recommendations: Recommendation/Intervention: 1. Pending NGT placement. When able, initiate TFs. Rec'd Novasource @ 35 mL/hr to provide 1680 kcals, 76 g of protein, 602 mL fluid. 2. RD to monitor & follow-up.  Goals: Meet % EEN, EPN by RD f/u date    Patient has been screened and assessed by RD. RD will follow patient.      Septic shock  See above    Acute renal failure  See microscopic polyangitis    Cervical adenopathy  See above      Lymphadenopathy of head and neck  - Has bilateral neck gland swelling with tenderness,consulted ENT  - No surgical intervention indicated   - Adenopathy likely reactive in nature   - Recommend further workup if it does not resolve within next 2 weeks     Multifocal pneumonia  See above      Pulmonary HTN  See above      CKD (chronic kidney disease), stage III  See microscopic polyangitis    HTN (hypertension)  - Continue home amlodipine and lisinopril  - PRN hydralazine      Hypothyroid  - Continue synthroid 25 mcg  - TSH 0.585 10/27/22      VTE Risk Mitigation (From admission, onward)         Ordered     Place sequential compression device  Until discontinued         10/25/22 1731     IP VTE HIGH RISK PATIENT  Once         10/17/22 1354     Reason for No Pharmacological VTE Prophylaxis  Once        Question:  Reasons:  Answer:  Risk of Bleeding    10/17/22 1354                Discharge Planning   ANTHONY: 10/31/2022     Code Status: Full Code   Is the patient medically ready for discharge?: No    Reason for patient still in hospital (select all that apply): Patient trending condition, Laboratory test, Treatment, Consult recommendations and Pending disposition  Discharge Plan A: Home with family                  Ethan De Jesus MD  Department of Hospital Medicine   [facility] - Intensive Care ([place]-[facility])

## 2022-10-31 NOTE — ASSESSMENT & PLAN NOTE
Patient with baseline creatinine of 1 as recent as August 2022 with progressive worsening beginning in early October 1.3 (10/13)->1.6 (10/17)->3.0 (10/23) despite IV fluids.  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  Patient noted to have new onset proteinuria and hematuria over the last 1 month.  Additionally, would consider ATN in the setting of suspected sepsis from multifocal pneumonia.     Concerns for glomerulonephritis given patient's current clinical picture. Initial urine microscopy demonstrating muddy brown casts with likely acanthocytes noted as well. MITUL positive, proteinase 3 ab weakly positive, MPO strongly positive. Patient s/p high-dose IV solumedrol x3 doses, now on Solumedrol 50mg qd. Underwent kidney bx 10/27. Rheumatology consulted, and patient started on Plex, Ritux/cytoxan. Patient has received 2 rounds of Plex, as well as 1 dose of Ritux and cytoxan on 10/27. Given continually worsening renal function and uremic encephalopathy, patient underwent SLED without complication on 10/27. Transferred to floor on 10/29. Unable to tolerate HD on 10/29 due to tachycardia and anxiety. However, no urgent need for dialysis since that time. Significantly improved mentation on 10/31.     Recommendations:  - no need for RRT today (10/31), will re-evaluate tomorrow for HD  - s/p Plex #4 10/30  - f/u kidney bx results   - continue plex, Ritux/cytoxan per rheum (next dose of ritux and cytoxan on 11/3)  - continue steroid taper  - strict intake and output  - renally dose medications and avoid nephrotoxins when possible  - replete electrolytes as appropriate

## 2022-10-31 NOTE — PROGRESS NOTES
[facility] - Intensive Care ([place] [address])  [facility] Medicine  Progress Note    Patient Name: Kristin Goodman  MRN: [id number]  Patient Class: IP- Inpatient   Admission Date: 10/17/2022  Length of Stay: 14 days  Attending Physician: Ethan De Jesus MD  Primary Care Provider: Lori Hernandez MD        Subjective:     Principal Problem:Microscopic polyangiitis        HPI:  Kristin Goodman is a 77 y.o. female with a past medical history of HTN, hypothyroidism, HFpEF, and osteoarthritis of the arm who has presented to the ED for cough, SOB, and weakness. Daughter is present at the bedside. Patient presented to the ED on 9/24 with hemoptysis, CT and CXR showed high suspicion for multilobar pneumonia. Patient was discharged with a 10-day course of levofloxacin and an albuterol inhaler. Patient followed up with her PCP on 10/4 with slight improvement in symptoms and went back to work. Patient continued to have worsening symptoms and presented to the ED again on 10/14 for evaluation and no interventions were done. Patient endorses fevers over the last few days up to 102.4 F with progressive SOB. She endorses hemoptysis with moderate amount of blood, generalized weakness, productive cough, SOB, and loss of appetite. Denies chest pain, nausea, vomiting, abdominal pain, or urinary changes.    ED: hypertensive up to 219/93 and tachycardic up to 117. Oxygen saturation on 92% on RA, placed on 5L NC with sats >97%. CBC remarkable for leukocytosis of 16.03 and Hb 7.7. K 3.3. Cr 1.6, baseline ~1.0. . Troponin 0.061. COVID and flu negative. EKG NSR. CT chest with contrast pending at time of admission. Given home amlodipine and lisinopril. Given 500mL NS, IV azithromycin, cefepime and vanc.       Overview/Hospital Course:  Initially admitted to hospital medicine, but stepped up to ICU due to hemoptysis. She has completed a course of Abx for PNA. Her presentation was concerning for a vasculitis and Rheumatology and nephrology  consulted. Serologic testing revealed microscopic polyangitis. Renal Biopsy done, pending. She has had plex, steroids, rituximab, cyclophosphamide, and CRRT. Nephro plans to continue HD. Tapering her steroids per rheum recs. Doing well on 2L NC. Stable for step down.       Interval History:   No events overnight. Patient visiting with family this morning and singing with relative. Patient endorse slight dyspnea and improved dysuria. No plans for HD today. Day 5 of PLEX.       Review of Systems   Constitutional:  Positive for appetite change and fatigue. Negative for activity change, chills, diaphoresis and fever.   HENT:  Negative for rhinorrhea and sore throat.    Respiratory:  Positive for shortness of breath. Negative for cough and chest tightness.    Cardiovascular:  Negative for chest pain and palpitations.   Gastrointestinal:  Negative for abdominal pain, constipation, diarrhea and nausea.   Endocrine: Negative for cold intolerance.   Genitourinary:  Positive for dysuria. Negative for decreased urine volume.   Musculoskeletal:  Negative for arthralgias and myalgias.   Skin:  Negative for rash and wound.   Neurological:  Positive for weakness. Negative for dizziness, numbness and headaches.   Psychiatric/Behavioral:  Negative for agitation, behavioral problems and confusion.    Objective:     Vital Signs (Most Recent):  Temp: 97.6 °F (36.4 °C) (10/31/22 1500)  Pulse: 80 (10/31/22 1500)  Resp: 16 (10/31/22 1500)  BP: (!) 173/78 (10/31/22 1500)  SpO2: 100 % (10/31/22 1500)   Vital Signs (24h Range):  Temp:  [96.8 °F (36 °C)-98.7 °F (37.1 °C)] 97.6 °F (36.4 °C)  Pulse:  [72-96] 80  Resp:  [15-23] 16  SpO2:  [92 %-100 %] 100 %  BP: (168-186)/(77-84) 173/78     Weight: 63.5 kg (139 lb 15.9 oz)  Body mass index is 26.45 kg/m².    Intake/Output Summary (Last 24 hours) at 10/31/2022 1612  Last data filed at 10/31/2022 0830  Gross per 24 hour   Intake 7447 ml   Output 465 ml   Net 6982 ml      Physical Exam  Vitals and  nursing note reviewed.   Constitutional:       General: She is not in acute distress.     Appearance: She is well-developed. She is ill-appearing. She is not toxic-appearing.      Comments: Soft-spoken    HENT:      Head: Normocephalic and atraumatic.      Nose:      Comments: NG tube     Mouth/Throat:      Mouth: Mucous membranes are dry.   Eyes:      Conjunctiva/sclera: Conjunctivae normal.   Cardiovascular:      Rate and Rhythm: Normal rate and regular rhythm.      Heart sounds: Normal heart sounds.   Pulmonary:      Effort: Pulmonary effort is normal. No respiratory distress.      Breath sounds: No wheezing or rales.   Abdominal:      General: Bowel sounds are normal. There is no distension.      Palpations: Abdomen is soft.      Tenderness: There is no abdominal tenderness.   Musculoskeletal:         General: No tenderness. Normal range of motion.      Cervical back: Neck supple.      Right lower leg: No edema.      Left lower leg: No edema.   Lymphadenopathy:      Cervical: No cervical adenopathy.   Skin:     General: Skin is warm and dry.      Findings: No rash.   Neurological:      General: No focal deficit present.      Mental Status: She is alert and oriented to person, place, and time.   Psychiatric:         Mood and Affect: Mood normal.       Significant Labs: CBC:   Recent Labs   Lab 10/30/22  0426 10/31/22  0224   WBC 46.38* 31.24*   HGB 9.8* 9.6*   HCT 30.1* 28.8*    260     CMP:   Recent Labs   Lab 10/30/22  0426 10/30/22  1424 10/30/22  2159 10/31/22  0224   *  146* 146* 146* 148*  149*   K 3.3*  3.3* 3.6 4.4 3.5  3.5     107 106 107 108  108   CO2 25  25 24 21* 27  27   *  127* 167* 143* 116*  116*   *  126* 126* 129* 132*  128*   CREATININE 4.4*  4.4* 4.2* 4.3* 4.2*  4.2*   CALCIUM 8.7  8.8 8.2* 8.4* 8.4*  8.4*   PROT 5.8*  --   --  5.4*   ALBUMIN 2.9*  2.9* 3.1* 3.0* 3.2*  3.1*   BILITOT 0.9  --   --  0.9   ALKPHOS 89  --   --  65   AST 33  --    --  22   ALT 29  --   --  20   ANIONGAP 14  14 16 18* 13  14       Significant Imaging: I have reviewed all pertinent imaging results/findings within the past 24 hours.      Assessment/Plan:      * Microscopic polyangiitis  As of 10/26/22 strongly positive MPO Ab (in contrast to borderline positive PR3), consistent with clinical picture of microscopic polyangiitis with pulmonary, and renal involvement. Trialysis catheter placed overnight with plans for plasmapheresis early this AM. Pt tolerated procedure well despite multiple attempts to place line. Went for IR Renal biopsy this AM. Tolerated procedure well. Endorsing burning at szymanski insertion site. Continuing Azithromycin and Cefepime. Rheumatology planning to start rituximab and cyclophosphamide.      - Nephrology consulted  - Transfusion medicine consult  - Rheumatology consulted  - Continue Plasmapheresis w/ plans for 7 treatments over 14-day period  -- Session 5 on 10/31  - Rituximab and cyclophosphamide initiated 10/28.   -- Next Rituximab 275 mg/m^2 due on November 3  -- Next cyclophosphamide 7.5 mg/kg due on November 10          - s/p 7 days of high dose steroids, tapering per rheumatology's recs  -- IV solumedrol 24mg daily x7 days  - Protonix daily  - PJP prophylaxis with atovaquone via NG    Acute hypoxemic respiratory failure  Microscopic polyangitis  Multifocal pneumonia  Hemoptysis  76 yo PMHx HTN, hypothyroid, HFpEF, OA admitted for fevers and respiratory symptoms, treated while on Hospital Medicine for multifocal PNA. Course complicated by GIB bleed (no EGD yet d/t PNA), hyponatremia, ATN. MICU consulted for rapidly increasing oxygen requirement (4L NC to BiPAP 15/10 50%), respiratory distress/work of breathing, somnolence. Nephrology consulted for ATN, concerns for possible nephritic disease as  behind ATN d/t acanthrocytes. Reports of scant hemoptysis by nursing staff 10/21, 10/22.     Imaging: CXR: Diffuse bilateral airspace infiltrates  w/ c/f alveolar fillings; CT chest wc 10/17 with bl perihilar dense consolidations w/ peripheral patcy areas of GGO, BL PNA, less c/f cardiogenic pulmonary edema.  Labs: WBC uptrending 28.97, Hgb lowest 6.0 (s/p 2 u PRBC), continues to downtrend approx 1 point / day. sCr uptrending, (baseline 1.0-1.2). .        No results for input(s): PH, PCO2, PO2, HCO3, POCSATURATED, BE in the last 72 hours.  Etiology: Microscopic polyangitis likely. Concerns for possible PE as patient was not on thrombotic ppx s/o GIB. Incomplete I/Os charted, though reports of low UOP also renders pulmonary congestion edema as a possibility. Less c/f acute progression of multifocal PNA as adequate ABx coverage and no new fevers recently.     - US DVT BLE unremarkable  - Consideration for urgent BAL +/- repeat chest imaging (last done 10/17)  - Rheumatology consulted  -- Continue Rituximab, cyclophosphamide  -- IV solumedrol per Rheumatology  -- PJP prophylaxis  - Nephrology consulted  -- iHD nephrology  - Transfusion medicine  -- Plasmapheresis started 10/26, plans for 7 cycles  - Neuro checks  - Wean supplemental O2 as tolerated    Acute renal failure superimposed on stage 3 chronic kidney disease  Patient w/o CKD presenting with WILFREDO with rapidly increasing sCr (baseline sCr 1.0-1.2). New onset proteinuria/hematuria in last 30 days. .  DDx: ATN vs GN.       - Renal Bx done, f/u pathology  - High-dose steroids completed, tapering per rheumatology recs.  - Rituximab and cytoxan per rheum recs.  - Undergoing PLEX, see transfusion medicine recs  - Trend sCr  - Strict I&Os  - Avoid nephrotoxic agents  - Renally adjust medications  - Nephro and Rheumatology following    Chronic diastolic heart failure  Pulmonary Hypertension   TTE (10/2022) EF 65%, G1DD  Home meds: Lisinopril, Toprol     - Volume control plan per Acute Hypoxemic Respiratory Failure A/P  - Daily weights (standing if tolerated)  - Strict I/Os  - Fluid restriction 1.5  day  - Cardiac diet w/ 2 g Na restriction      Dysuria  - UA 10/30 not consistent with urinary tract infection  - Unable to take Pyridium due to renal function      Moderate malnutrition  Nutrition consulted. Most recent weight and BMI monitored-          Malnutrition (Moderate to Severe)  Weight Loss (Malnutrition): 7.5% in 3 months              Measurements:  Wt Readings from Last 1 Encounters:   10/27/22 63.5 kg (139 lb 15.9 oz)   Body mass index is 26.45 kg/m².    Recommendations: Recommendation/Intervention: 1. Pending NGT placement. When able, initiate TFs. Rec'd Novasource @ 35 mL/hr to provide 1680 kcals, 76 g of protein, 602 mL fluid. 2. RD to monitor & follow-up.  Goals: Meet % EEN, EPN by RD f/u date    Patient has been screened and assessed by RD. RD will follow patient.      Septic shock  See above    Acute renal failure  See microscopic polyangitis    Cervical adenopathy  See above      Lymphadenopathy of head and neck  - Has bilateral neck gland swelling with tenderness,consulted ENT  - No surgical intervention indicated   - Adenopathy likely reactive in nature   - Recommend further workup if it does not resolve within next 2 weeks     Multifocal pneumonia  See above      Pulmonary HTN  See above      CKD (chronic kidney disease), stage III  See microscopic polyangitis    HTN (hypertension)  - Continue home amlodipine and lisinopril  - PRN hydralazine      Hypothyroid  - Continue synthroid 25 mcg  - TSH 0.585 10/27/22      VTE Risk Mitigation (From admission, onward)         Ordered     Place sequential compression device  Until discontinued         10/25/22 1731     IP VTE HIGH RISK PATIENT  Once         10/17/22 1354     Reason for No Pharmacological VTE Prophylaxis  Once        Question:  Reasons:  Answer:  Risk of Bleeding    10/17/22 1354                Discharge Planning   ANTHONY: 11/4/2022     Code Status: Full Code   Is the patient medically ready for discharge?: No    Reason for patient  still in hospital (select all that apply): Patient trending condition, Laboratory test, Treatment, Consult recommendations, PT / OT recommendations and Pending disposition  Discharge Plan A: Home with family                  Ethan De Jesus MD  Department of Hospital Medicine   [facility] - Intensive Care ([place]-14)

## 2022-10-31 NOTE — PROGRESS NOTES
END OF SHIFT    Pt remains on 2L NC PRN throughout day. Pt able to sit up on side of bed with PT (max assist). Plan for plasmapheresis #5 before midnight. Continue tube feedings through NG tube - Novasource @35.   Pt resting in bed with family at bedside. Call light within reach. Bed in lowest position with 3 railings raised. Pt stable.    Jenifer Gallegos

## 2022-10-31 NOTE — ASSESSMENT & PLAN NOTE
- UA 10/30 not consistent with urinary tract infection  - Unable to take Pyridium due to renal function

## 2022-10-31 NOTE — ASSESSMENT & PLAN NOTE
Patient is a 78 yo F with chronic diastolic heart failure, HTN, OA, who is admitted for respiratory failure. Rheumatology is consulted due to concern for vasculitis. Patient presented with cough and hemoptysis since 9/24/22. She was admitted due to dyspnea and hypoxia on 10/17. She has had Hb drop to 6 this admission requiring blood transfusions. Reviewed her chest imaging which shows progressive disease from 10/14 until now which can be concerning for DAH. This might also explain the Hb drop. No bronch planned for now due to her tenuous respiratory status. GI defers invasive workup for now because she is not stable enough. She has had increasing creatinine from baseline of 1 to 3.0 on 10/23/22. Nephrology concerned for ATN nephritic picture in urine sediment. Pending kidney biopsy result. BVAS 1/33    UA: 1+ blood, 88 RBCs, 18 WBCs  UPCR 1.54  RF and CCP negative  MITUL+ 1:2560 homogenous, +dsDNA, complements normal  PR3 slightly elevated at 1.2 (reference positive is 1.0)  MPO elevated at 132  C-ANCA+ 1:80  P-ANCA-  GBM antibodies negative  Quantiferon indeterminate  T-Spot Negative    Pending: cryoglobulins    Treating empirically for ANCA vasculitis while awaiting kidney biopsy results:  - s/p IV Solumedrol 1000 mg x3 doses. Now following PEXIVAS steroid taper. Currently on IV Solumedrol 24 mg daily.  - PLEX 10/26, 10/27, 10/29. Next scheduled for 10/30. Total 7 treatments planned.  - Got SLED 10/27. Bedside HD 10/30  - Rituximab 375 mg/m^2 (600 mg) on 10/27 (every 7 day dose 1 of 4)  - Cyclophosphamide 7.5 mg/kg on 10/27 (every 14 day dose 1 of 2)    Recommendations:  1. Continue steroid taper per PEXIVAS trial as in the chart below. Currently IV Solu-Medrol 24 mg daily (prednisone 30 mg daily equivalent). Started on 10/30  2. Atovaquone 1500 mg and Protonix daily while on high dose steroids.  3. Next Rituximab 275 mg/m^2 due on November 3  4. Next cyclophosphamide 7.5 mg/kg due on November 10  5. Monitor CBC and  CMP in 10-14 days after giving chemotherapy  6. Follow up kidney biopsy

## 2022-10-31 NOTE — SUBJECTIVE & OBJECTIVE
Interval History: feeling better today. On 2 L NC. Still has feedings via NGT.    Current Facility-Administered Medications   Medication Frequency    0.9%  NaCl infusion (for blood administration) Q24H PRN    0.9%  NaCl infusion (for blood administration) Q24H PRN    acetaminophen tablet 650 mg Q4H PRN    albuterol-ipratropium 2.5 mg-0.5 mg/3 mL nebulizer solution 3 mL Q4H PRN    aluminum-magnesium hydroxide-simethicone 200-200-20 mg/5 mL suspension 30 mL QID PRN    amLODIPine tablet 10 mg Daily    atovaquone 750 mg/5 mL oral liquid 1,500 mg Daily    bisacodyL suppository 10 mg Daily PRN    carvediloL tablet 6.25 mg BID    cloNIDine tablet 0.2 mg Q4H PRN    dextrose 10% bolus 125 mL PRN    dextrose 10% bolus 250 mL PRN    glucagon (human recombinant) injection 1 mg PRN    glucose chewable tablet 16 g PRN    glucose chewable tablet 24 g PRN    hydrALAZINE injection 10 mg Q8H PRN    insulin aspart U-100 pen 1-10 Units Q6H PRN    levothyroxine tablet 25 mcg Before breakfast    lisinopriL tablet 40 mg Daily    melatonin tablet 6 mg Nightly PRN    methocarbamoL tablet 500 mg TID PRN    methylPREDNISolone sodium succinate injection 24 mg Daily    naloxone 0.4 mg/mL injection 0.02 mg PRN    ondansetron disintegrating tablet 8 mg Q8H PRN    [START ON 11/1/2022] pantoprazole suspension 40 mg Daily    prochlorperazine injection Soln 5 mg Q6H PRN    QUEtiapine tablet 25 mg QHS    simethicone chewable tablet 80 mg QID PRN    sodium chloride 0.9% 250 mL flush bag 1 time in Clinic/HOD    sodium chloride 0.9% flush 10 mL PRN    sodium chloride 0.9% flush 5 mL PRN     Facility-Administered Medications Ordered in Other Encounters   Medication Frequency    celecoxib capsule 400 mg Once    fentaNYL 50 mcg/mL injection  mcg PRN    LIDOcaine (PF) 10 mg/ml (1%) injection 10 mg Once PRN    LIDOcaine (PF) 10 mg/ml (1%) injection 10 mg Once    midazolam (VERSED) 1 mg/mL injection 0.5-4 mg PRN    ropivacaine 0.2% [facility] PainPRO Pump  infusion 500 ML Continuous     Objective:     Vital Signs (Most Recent):  Temp: 98.5 °F (36.9 °C) (10/31/22 1118)  Pulse: 83 (10/31/22 1118)  Resp: 15 (10/31/22 1118)  BP: (!) 168/77 (10/31/22 1156)  SpO2: 98 % (10/31/22 1118)  O2 Device (Oxygen Therapy): nasal cannula (10/31/22 0770)   Vital Signs (24h Range):  Temp:  [96.8 °F (36 °C)-98.7 °F (37.1 °C)] 98.5 °F (36.9 °C)  Pulse:  [70-96] 83  Resp:  [15-23] 15  SpO2:  [91 %-100 %] 98 %  BP: (168-186)/(77-84) 168/77     Weight: 63.5 kg (139 lb 15.9 oz) (10/27/22 1334)  Body mass index is 26.45 kg/m².  Body surface area is 1.65 meters squared.      Intake/Output Summary (Last 24 hours) at 10/31/2022 1213  Last data filed at 10/31/2022 0830  Gross per 24 hour   Intake 7447 ml   Output 465 ml   Net 6982 ml       Physical Exam   Constitutional: She is oriented to person, place, and time. No distress.   HENT:   Head: Normocephalic and atraumatic.   Nose: No rhinorrhea or nasal congestion.   Mouth/Throat: Oropharynx is clear.   No tenderness to palpation of sinuses. NGT in place   Ears: No tenderness to palpation     Eyes: Pupils are equal, round, and reactive to light.   Cardiovascular: Normal rate and regular rhythm.   No murmur heard.  Pulmonary/Chest: Effort normal and breath sounds normal. No respiratory distress. She has no wheezes.   Abdominal: Soft. Bowel sounds are normal. There is no abdominal tenderness.   Musculoskeletal:         General: No deformity. Normal range of motion.      Cervical back: Normal range of motion.   Neurological: She is alert and oriented to person, place, and time.   Skin: Skin is warm and dry.   Psychiatric: Affect and judgment normal.     Significant Labs:  All pertinent lab results from the last 24 hours have been reviewed.    Significant Imaging:  Imaging results within the past 24 hours have been reviewed.
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
## 2022-10-31 NOTE — PT/OT/SLP PROGRESS
"Speech Language Pathology Treatment    Patient Name:  Kristin Goodman   MRN:  [id number]  Admitting Diagnosis: Microscopic polyangiitis    Recommendations:                 General Recommendations:  Dysphagia therapy  Diet recommendations:  NPO, Liquid Diet Level: NPO   Aspiration Precautions: Frequent oral care and Strict aspiration precautions   General Precautions: Standard, aspiration, fall  Communication strategies:  provide increased time to answer    Subjective     SLP reviewed Pt with RN pre/post session, RN cleared for tx  Pt presents calm  She explains, "It is not as bad as before" (re: sore throat"    Pain/Comfort:  Pain Rating 1: 4/10  Location - Side 1: Left  Location 1: ear  Pain Addressed 1: Nurse notified  Pain Rating Post-Intervention 1: 4/10  Pain Rating 2: other (see comments) (did not rate)  Location - Orientation 2: generalized  Location 2: throat  Pain Addressed 2: Nurse notified    Respiratory Status: Nasal cannula, flow 2 L/min    Objective:     Has the patient been evaluated by SLP for swallowing?   Yes  Keep patient NPO? Yes   Current Respiratory Status:    nasal canula, 2L     Pt found awake and partially reclined in bed with NG and oxygen in place, holding yankauer.   PCT in room to help reposition Pt in bed. HOB elevated per RN clearance. Pt was alert and oriented x2. She followed simple commands 80% of attempts provided min-mod verbal and visual cues. Patient with waxing/waning attention, at times required cues to redirect to task. Her voice was moderately hoarse with variable intensity. She demonstrated moderately reduced strength of throat clear upon command.Pt with dried, coated lingual surface and reduced lingual strength upon review of the oral mechanism.   Patient with inconsistent, at times delayed, initiation of swallow upon attempts to elicit swallow with tactile stim (Moistened toothette along lingual surface and faucial pillars 2 of 3 attempts.   Pt seen with low-level trial of " ice chip sliver x1. Pt with facial grimace, lingual pumping and delayed initiation of swallow with moderately decreased excursion upon digital palpation of the larynx. Pt noted to hold her L ear as session progressed.  Additional trials deferred. 2/2 pain, decreased endurance and aspiration risk.  Pt was educated on SLP Role, definition of aspiration, swallow anatomy, aspiration precautions and need for ongoing swallow assessment. RN notified and entered into room following SLP exit.     Assessment:     Kristin Goodman is a 77 y.o. female with an SLP diagnosis of Dysphagia.      Goals:   Multidisciplinary Problems       SLP Goals          Problem: SLP    Goal Priority Disciplines Outcome   SLP Goal     SLP Ongoing, Progressing   Description: Speech Language Pathology Goals  Goals expected to be met by 11/6/22    1. Pt will participate in ongoing assessment of swallow function to determine safest, least restrictive means of nutrition/hydration  2. Educate Pt and family on aspiration precautions and SLP POC                         Plan:     Patient to be seen:  4 x/week   Plan of Care expires:  11/28/22  Plan of Care reviewed with:  patient, friend   SLP Follow-Up:  Yes       Discharge recommendations:  other (see comments) (per Pt progress)   Barriers to Discharge:   NPO    Time Tracking:     SLP Treatment Date:   10/31/22  Speech Start Time:  1458  Speech Stop Time:  1523     Speech Total Time (min):  25 min    Billable Minutes: Treatment Swallowing Dysfunction 13 and Self Care/Home Management Training 10    10/31/2022

## 2022-10-31 NOTE — SUBJECTIVE & OBJECTIVE
Interval History: NAEON. Patient well appearing this morning, awake and alert and responding to questions. Reports improved breathing, no chest pain, worsening LE swelling.     Review of patient's allergies indicates:   Allergen Reactions    Ampicillin     Peaches [peach (prunus persica)] Other (See Comments)     Pt unable to state type of reaction. Information obtained from daughter who states she was informed of allergy from patient.    Penicillins      Other reaction(s): Hives    Sulfa (sulfonamide antibiotics) Rash and Hives     Current Facility-Administered Medications   Medication Frequency    0.9%  NaCl infusion (for blood administration) Q24H PRN    0.9%  NaCl infusion (for blood administration) Q24H PRN    acetaminophen tablet 650 mg Q4H PRN    albuterol-ipratropium 2.5 mg-0.5 mg/3 mL nebulizer solution 3 mL Q4H PRN    aluminum-magnesium hydroxide-simethicone 200-200-20 mg/5 mL suspension 30 mL QID PRN    amLODIPine tablet 10 mg Daily    atovaquone 750 mg/5 mL oral liquid 1,500 mg Daily    bisacodyL suppository 10 mg Daily PRN    carvediloL tablet 6.25 mg BID    cloNIDine tablet 0.2 mg Q4H PRN    dextrose 10% bolus 125 mL PRN    dextrose 10% bolus 250 mL PRN    glucagon (human recombinant) injection 1 mg PRN    glucose chewable tablet 16 g PRN    glucose chewable tablet 24 g PRN    hydrALAZINE injection 10 mg Q8H PRN    insulin aspart U-100 pen 1-10 Units Q6H PRN    levothyroxine tablet 25 mcg Before breakfast    lisinopriL tablet 40 mg Daily    melatonin tablet 6 mg Nightly PRN    methocarbamoL tablet 500 mg TID PRN    methylPREDNISolone sodium succinate injection 24 mg Daily    naloxone 0.4 mg/mL injection 0.02 mg PRN    ondansetron disintegrating tablet 8 mg Q8H PRN    [START ON 11/1/2022] pantoprazole suspension 40 mg Daily    prochlorperazine injection Soln 5 mg Q6H PRN    QUEtiapine tablet 25 mg QHS    simethicone chewable tablet 80 mg QID PRN    sodium chloride 0.9% 250 mL flush bag 1 time in  Clinic/HOD    sodium chloride 0.9% flush 10 mL PRN    sodium chloride 0.9% flush 5 mL PRN     Facility-Administered Medications Ordered in Other Encounters   Medication Frequency    celecoxib capsule 400 mg Once    fentaNYL 50 mcg/mL injection  mcg PRN    LIDOcaine (PF) 10 mg/ml (1%) injection 10 mg Once PRN    LIDOcaine (PF) 10 mg/ml (1%) injection 10 mg Once    midazolam (VERSED) 1 mg/mL injection 0.5-4 mg PRN    ropivacaine 0.2% [facility] PainPRO Pump infusion 500 ML Continuous       Objective:     Vital Signs (Most Recent):  Temp: 98.5 °F (36.9 °C) (10/31/22 1118)  Pulse: 83 (10/31/22 1118)  Resp: 15 (10/31/22 1118)  BP: (!) 168/77 (10/31/22 1156)  SpO2: 98 % (10/31/22 1118)  O2 Device (Oxygen Therapy): nasal cannula (10/31/22 0754)   Vital Signs (24h Range):  Temp:  [96.8 °F (36 °C)-98.7 °F (37.1 °C)] 98.5 °F (36.9 °C)  Pulse:  [70-96] 83  Resp:  [15-23] 15  SpO2:  [91 %-100 %] 98 %  BP: (168-186)/(77-84) 168/77     Weight: 63.5 kg (139 lb 15.9 oz) (10/27/22 1334)  Body mass index is 26.45 kg/m².  Body surface area is 1.65 meters squared.    I/O last 3 completed shifts:  In: 7307 [Blood:6131; NG/GT:1176]  Out: 1019 [Urine:850; Other:69; Stool:100]    Physical Exam  Vitals and nursing note reviewed.   Constitutional:       General: She is not in acute distress.     Appearance: She is well-developed. She is ill-appearing. She is not toxic-appearing.      Comments: Patient somnolent   HENT:      Head: Normocephalic and atraumatic.      Mouth/Throat:      Mouth: Mucous membranes are dry.   Eyes:      Conjunctiva/sclera: Conjunctivae normal.   Cardiovascular:      Rate and Rhythm: Normal rate and regular rhythm.      Heart sounds: Normal heart sounds.   Pulmonary:      Effort: Pulmonary effort is normal. No respiratory distress.      Breath sounds: No wheezing or rales.      Comments: Coarse breath sounds bilaterally  Abdominal:      General: Bowel sounds are normal. There is no distension.      Palpations:  Abdomen is soft.      Tenderness: There is no abdominal tenderness.   Musculoskeletal:         General: No tenderness. Normal range of motion.      Cervical back: Normal range of motion and neck supple.      Right lower leg: No edema.      Left lower leg: No edema.   Lymphadenopathy:      Cervical: No cervical adenopathy.   Skin:     General: Skin is warm and dry.      Capillary Refill: Capillary refill takes less than 2 seconds.      Findings: No rash.   Neurological:      General: No focal deficit present.      Mental Status: She is alert and oriented to person, place, and time.       Significant Labs:  CBC:   Recent Labs   Lab 10/31/22  0224   WBC 31.24*   RBC 3.38*   HGB 9.6*   HCT 28.8*      MCV 85   MCH 28.4   MCHC 33.3       CMP:   Recent Labs   Lab 10/31/22  0224   *  116*   CALCIUM 8.4*  8.4*   ALBUMIN 3.2*  3.1*   PROT 5.4*   *  149*   K 3.5  3.5   CO2 27  27     108   *  128*   CREATININE 4.2*  4.2*   ALKPHOS 65   ALT 20   AST 22   BILITOT 0.9       All labs within the past 24 hours have been reviewed.     Significant Imaging:

## 2022-10-31 NOTE — ASSESSMENT & PLAN NOTE
- UA 10/30 not consistent with urinary tract infection  - Able to take Pyridium due to renal function

## 2022-10-31 NOTE — ASSESSMENT & PLAN NOTE
As of 10/26/22 strongly positive MPO Ab (in contrast to borderline positive PR3), consistent with clinical picture of microscopic polyangiitis with pulmonary, and renal involvement. Trialysis catheter placed overnight with plans for plasmapheresis early this AM. Pt tolerated procedure well despite multiple attempts to place line. Went for IR Renal biopsy this AM. Tolerated procedure well. Endorsing burning at szymanski insertion site. Continuing Azithromycin and Cefepime. Rheumatology planning to start rituximab and cyclophosphamide.      - Nephrology consulted  - Transfusion medicine consult  - Rheumatology consulted  - Continue Plasmapheresis w/ plans for 7 treatments over 14-day period  -- Session 5 on 10/31  - Rituximab and cyclophosphamide initiated 10/28.   -- Next Rituximab 275 mg/m^2 due on November 3  -- Next cyclophosphamide 7.5 mg/kg due on November 10          - s/p 7 days of high dose steroids, tapering per rheumatology's recs  -- IV solumedrol 24mg daily x7 days  - Protonix daily  - PJP prophylaxis with atovaquone via NG

## 2022-11-01 PROBLEM — E87.0 HYPERNATREMIA: Status: ACTIVE | Noted: 2022-11-01

## 2022-11-01 LAB
ABO + RH BLD: NORMAL
ALBUMIN SERPL BCP-MCNC: 2.5 G/DL (ref 3.5–5.2)
ALP SERPL-CCNC: 65 U/L (ref 55–135)
ALT SERPL W/O P-5'-P-CCNC: 19 U/L (ref 10–44)
ANION GAP SERPL CALC-SCNC: 13 MMOL/L (ref 8–16)
ANISOCYTOSIS BLD QL SMEAR: SLIGHT
AST SERPL-CCNC: 18 U/L (ref 10–40)
BASOPHILS # BLD AUTO: 0.04 K/UL (ref 0–0.2)
BASOPHILS NFR BLD: 0.1 % (ref 0–1.9)
BILIRUB SERPL-MCNC: 0.4 MG/DL (ref 0.1–1)
BLD GP AB SCN CELLS X3 SERPL QL: NORMAL
BLD PROD TYP BPU: NORMAL
BLOOD UNIT EXPIRATION DATE: NORMAL
BLOOD UNIT TYPE CODE: 1700
BLOOD UNIT TYPE CODE: 1700
BLOOD UNIT TYPE CODE: 7300
BLOOD UNIT TYPE: NORMAL
BUN SERPL-MCNC: 143 MG/DL (ref 8–23)
CALCIUM SERPL-MCNC: 8.3 MG/DL (ref 8.7–10.5)
CHLORIDE SERPL-SCNC: 110 MMOL/L (ref 95–110)
CO2 SERPL-SCNC: 27 MMOL/L (ref 23–29)
CODING SYSTEM: NORMAL
CREAT SERPL-MCNC: 4.7 MG/DL (ref 0.5–1.4)
DIFFERENTIAL METHOD: ABNORMAL
DISPENSE STATUS: NORMAL
EOSINOPHIL # BLD AUTO: 0.6 K/UL (ref 0–0.5)
EOSINOPHIL NFR BLD: 2 % (ref 0–8)
ERYTHROCYTE [DISTWIDTH] IN BLOOD BY AUTOMATED COUNT: 15.2 % (ref 11.5–14.5)
EST. GFR  (NO RACE VARIABLE): 9.2 ML/MIN/1.73 M^2
GLUCOSE SERPL-MCNC: 160 MG/DL (ref 70–110)
HCT VFR BLD AUTO: 25.2 % (ref 37–48.5)
HGB BLD-MCNC: 8 G/DL (ref 12–16)
HYPOCHROMIA BLD QL SMEAR: ABNORMAL
IMM GRANULOCYTES # BLD AUTO: 0.41 K/UL (ref 0–0.04)
IMM GRANULOCYTES NFR BLD AUTO: 1.5 % (ref 0–0.5)
LYMPHOCYTES # BLD AUTO: 1.1 K/UL (ref 1–4.8)
LYMPHOCYTES NFR BLD: 3.9 % (ref 18–48)
MAGNESIUM SERPL-MCNC: 2 MG/DL (ref 1.6–2.6)
MCH RBC QN AUTO: 28.5 PG (ref 27–31)
MCHC RBC AUTO-ENTMCNC: 31.7 G/DL (ref 32–36)
MCV RBC AUTO: 90 FL (ref 82–98)
MONOCYTES # BLD AUTO: 1.4 K/UL (ref 0.3–1)
MONOCYTES NFR BLD: 5.2 % (ref 4–15)
NEUTROPHILS # BLD AUTO: 23.9 K/UL (ref 1.8–7.7)
NEUTROPHILS NFR BLD: 87.3 % (ref 38–73)
NRBC BLD-RTO: 0 /100 WBC
NUM UNITS TRANS FFP: NORMAL
PHOSPHATE SERPL-MCNC: 5.4 MG/DL (ref 2.7–4.5)
PLATELET # BLD AUTO: 244 K/UL (ref 150–450)
PMV BLD AUTO: 11.6 FL (ref 9.2–12.9)
POCT GLUCOSE: 155 MG/DL (ref 70–110)
POCT GLUCOSE: 163 MG/DL (ref 70–110)
POCT GLUCOSE: 210 MG/DL (ref 70–110)
POCT GLUCOSE: 234 MG/DL (ref 70–110)
POIKILOCYTOSIS BLD QL SMEAR: SLIGHT
POLYCHROMASIA BLD QL SMEAR: ABNORMAL
POTASSIUM SERPL-SCNC: 3.4 MMOL/L (ref 3.5–5.1)
PROT SERPL-MCNC: 4.6 G/DL (ref 6–8.4)
RBC # BLD AUTO: 2.81 M/UL (ref 4–5.4)
SODIUM SERPL-SCNC: 150 MMOL/L (ref 136–145)
SPHEROCYTES BLD QL SMEAR: ABNORMAL
TARGETS BLD QL SMEAR: ABNORMAL
WBC # BLD AUTO: 27.35 K/UL (ref 3.9–12.7)

## 2022-11-01 PROCEDURE — 99231 SBSQ HOSP IP/OBS SF/LOW 25: CPT | Mod: GC,,, | Performed by: INTERNAL MEDICINE

## 2022-11-01 PROCEDURE — 84100 ASSAY OF PHOSPHORUS: CPT

## 2022-11-01 PROCEDURE — 36514 APHERESIS PLASMA: CPT | Mod: ,,, | Performed by: PATHOLOGY

## 2022-11-01 PROCEDURE — 27000221 HC OXYGEN, UP TO 24 HOURS

## 2022-11-01 PROCEDURE — 99900035 HC TECH TIME PER 15 MIN (STAT)

## 2022-11-01 PROCEDURE — 99233 PR SUBSEQUENT HOSPITAL CARE,LEVL III: ICD-10-PCS | Mod: ,,, | Performed by: INTERNAL MEDICINE

## 2022-11-01 PROCEDURE — P9045 ALBUMIN (HUMAN), 5%, 250 ML: HCPCS | Mod: JG | Performed by: PATHOLOGY

## 2022-11-01 PROCEDURE — 99233 SBSQ HOSP IP/OBS HIGH 50: CPT | Mod: ,,, | Performed by: STUDENT IN AN ORGANIZED HEALTH CARE EDUCATION/TRAINING PROGRAM

## 2022-11-01 PROCEDURE — 83735 ASSAY OF MAGNESIUM: CPT

## 2022-11-01 PROCEDURE — 36514 PR THER APHERESIS,PLASMA PHERESIS: ICD-10-PCS | Mod: ,,, | Performed by: PATHOLOGY

## 2022-11-01 PROCEDURE — 94761 N-INVAS EAR/PLS OXIMETRY MLT: CPT

## 2022-11-01 PROCEDURE — 25000003 PHARM REV CODE 250: Performed by: HOSPITALIST

## 2022-11-01 PROCEDURE — P9017 PLASMA 1 DONOR FRZ W/IN 8 HR: HCPCS | Performed by: PATHOLOGY

## 2022-11-01 PROCEDURE — 99233 SBSQ HOSP IP/OBS HIGH 50: CPT | Mod: ,,, | Performed by: INTERNAL MEDICINE

## 2022-11-01 PROCEDURE — 99233 PR SUBSEQUENT HOSPITAL CARE,LEVL III: ICD-10-PCS | Mod: ,,, | Performed by: STUDENT IN AN ORGANIZED HEALTH CARE EDUCATION/TRAINING PROGRAM

## 2022-11-01 PROCEDURE — 25000003 PHARM REV CODE 250: Performed by: STUDENT IN AN ORGANIZED HEALTH CARE EDUCATION/TRAINING PROGRAM

## 2022-11-01 PROCEDURE — 94660 CPAP INITIATION&MGMT: CPT

## 2022-11-01 PROCEDURE — 25000003 PHARM REV CODE 250

## 2022-11-01 PROCEDURE — 36514 APHERESIS PLASMA: CPT

## 2022-11-01 PROCEDURE — 99231 PR SUBSEQUENT HOSPITAL CARE,LEVL I: ICD-10-PCS | Mod: GC,,, | Performed by: INTERNAL MEDICINE

## 2022-11-01 PROCEDURE — 63600175 PHARM REV CODE 636 W HCPCS: Performed by: STUDENT IN AN ORGANIZED HEALTH CARE EDUCATION/TRAINING PROGRAM

## 2022-11-01 PROCEDURE — 80053 COMPREHEN METABOLIC PANEL: CPT

## 2022-11-01 PROCEDURE — 85025 COMPLETE CBC W/AUTO DIFF WBC: CPT

## 2022-11-01 PROCEDURE — 63600175 PHARM REV CODE 636 W HCPCS: Performed by: PATHOLOGY

## 2022-11-01 PROCEDURE — 25000003 PHARM REV CODE 250: Performed by: PATHOLOGY

## 2022-11-01 PROCEDURE — 20600001 HC STEP DOWN PRIVATE ROOM

## 2022-11-01 PROCEDURE — 86901 BLOOD TYPING SEROLOGIC RH(D): CPT | Performed by: NURSE PRACTITIONER

## 2022-11-01 RX ORDER — CALCIUM GLUCONATE 20 MG/ML
2 INJECTION, SOLUTION INTRAVENOUS ONCE
Status: COMPLETED | OUTPATIENT
Start: 2022-11-02 | End: 2022-11-02

## 2022-11-01 RX ORDER — ALBUMIN HUMAN 50 G/1000ML
50 SOLUTION INTRAVENOUS ONCE
Status: COMPLETED | OUTPATIENT
Start: 2022-11-02 | End: 2022-11-02

## 2022-11-01 RX ORDER — DIPHENHYDRAMINE HYDROCHLORIDE 50 MG/ML
50 INJECTION INTRAMUSCULAR; INTRAVENOUS ONCE
Status: COMPLETED | OUTPATIENT
Start: 2022-11-02 | End: 2022-11-02

## 2022-11-01 RX ORDER — ALBUMIN HUMAN 50 G/1000ML
50 SOLUTION INTRAVENOUS ONCE
Status: COMPLETED | OUTPATIENT
Start: 2022-11-01 | End: 2022-11-01

## 2022-11-01 RX ORDER — HEPARIN SODIUM 1000 [USP'U]/ML
3200 INJECTION, SOLUTION INTRAVENOUS; SUBCUTANEOUS ONCE
Status: COMPLETED | OUTPATIENT
Start: 2022-11-02 | End: 2022-11-02

## 2022-11-01 RX ORDER — DIPHENHYDRAMINE HYDROCHLORIDE 50 MG/ML
50 INJECTION INTRAMUSCULAR; INTRAVENOUS ONCE
Status: COMPLETED | OUTPATIENT
Start: 2022-11-01 | End: 2022-11-01

## 2022-11-01 RX ORDER — HEPARIN SODIUM 1000 [USP'U]/ML
3200 INJECTION, SOLUTION INTRAVENOUS; SUBCUTANEOUS ONCE
Status: COMPLETED | OUTPATIENT
Start: 2022-11-01 | End: 2022-11-01

## 2022-11-01 RX ADMIN — INSULIN ASPART 4 UNITS: 100 INJECTION, SOLUTION INTRAVENOUS; SUBCUTANEOUS at 12:11

## 2022-11-01 RX ADMIN — CARVEDILOL 6.25 MG: 6.25 TABLET, FILM COATED ORAL at 08:11

## 2022-11-01 RX ADMIN — ACETAMINOPHEN 650 MG: 325 TABLET ORAL at 10:11

## 2022-11-01 RX ADMIN — CALCIUM GLUCONATE 1 G: 98 INJECTION, SOLUTION INTRAVENOUS at 01:11

## 2022-11-01 RX ADMIN — DIPHENHYDRAMINE HYDROCHLORIDE 50 MG: 50 INJECTION, SOLUTION INTRAMUSCULAR; INTRAVENOUS at 01:11

## 2022-11-01 RX ADMIN — PANTOPRAZOLE SODIUM 40 MG: 40 GRANULE, DELAYED RELEASE ORAL at 08:11

## 2022-11-01 RX ADMIN — HYDRALAZINE HYDROCHLORIDE 10 MG: 20 INJECTION, SOLUTION INTRAMUSCULAR; INTRAVENOUS at 08:11

## 2022-11-01 RX ADMIN — CLONIDINE HYDROCHLORIDE 0.2 MG: 0.1 TABLET ORAL at 12:11

## 2022-11-01 RX ADMIN — QUETIAPINE FUMARATE 25 MG: 25 TABLET ORAL at 08:11

## 2022-11-01 RX ADMIN — INSULIN ASPART 4 UNITS: 100 INJECTION, SOLUTION INTRAVENOUS; SUBCUTANEOUS at 05:11

## 2022-11-01 RX ADMIN — ALBUMIN (HUMAN) 50 G: 12.5 SOLUTION INTRAVENOUS at 01:11

## 2022-11-01 RX ADMIN — LEVOTHYROXINE SODIUM 25 MCG: 25 TABLET ORAL at 05:11

## 2022-11-01 RX ADMIN — METHYLPREDNISOLONE SODIUM SUCCINATE 24 MG: 40 INJECTION, POWDER, FOR SOLUTION INTRAMUSCULAR; INTRAVENOUS at 08:11

## 2022-11-01 RX ADMIN — HEPARIN SODIUM 3200 UNITS: 1000 INJECTION, SOLUTION INTRAVENOUS; SUBCUTANEOUS at 01:11

## 2022-11-01 RX ADMIN — AMLODIPINE BESYLATE 10 MG: 10 TABLET ORAL at 08:11

## 2022-11-01 RX ADMIN — LISINOPRIL 40 MG: 20 TABLET ORAL at 08:11

## 2022-11-01 RX ADMIN — ATOVAQUONE 1500 MG: 750 SUSPENSION ORAL at 08:11

## 2022-11-01 NOTE — PROCEDURES
[facility] - Card[facility] Medical [facility]  Transfusion Medicine  Procedure Note    SUMMARY   Therapeutic Plasma Exchange (Apheresis)    Date/Time: 11/1/2022 3:37 PM  Performed by: Francisco J Jackson MD  Authorized by: Francisco J Jackson MD       Date of Procedure: 11/1/2022     Procedure: Plasma Exchange    Provider: Francisco J Jackson MD     Assisting Provider: None    Pre-Procedure Diagnosis: Microscopic polyangiitis    Post-Procedure Diagnosis: Microscopic polyangiitis    Follow-up Assessment: Ms. Goodman is a  76 yo F with chronic diastolic heart failure, HTN and recent ED visit on 9/24 for PNA with complaints of cough x 1 week and hemoptysis x 1 on 9/24 who returned on 10/14 and 0/17 with complaints of dyspnea. She was subsequently admitted after her last ED visit for respiratory failure with 88% oxygen saturation. CT chest with contrast on 10/17 showed evidence of interval progression of bilateral perihilar dense consolidation with peripheral patchy areas of ground-glass opacification nonspecific as to the etiology.  Worsening pneumonia as well as inflammatory etiologies were considered. On admission patient's creatinine at 1.6 (10/17) with worsening progression (currently @ 5.7). 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 Thus far, patient has received pulse steroids with future plans for Rituximab and Cyclophosphamide. Transfusion Medicine consulted for apheresis support.    Vasculitis, ANCA-associated with DAH carries a Category I Grade 1A indication for therapeutic apheresis via the 2016 Journal of Clinical Apheresis Guidelines (Lewis J et al. Journal of Clinical Apheresis 2016; 31:149-162.)    Interval History:  Today's procedure was well tolerated and without complications. Next treatment scheduled for 11/2.    Pertinent Laboratory Data: Complete Blood Count:   Lab Results   Component Value Date    HGB 8.0 (L) 11/01/2022    HCT 25.2 (L)  11/01/2022     11/01/2022    WBC 27.35 (H) 11/01/2022     Comprehensive Metabolic Panel:   Lab Results   Component Value Date     (H) 11/01/2022    K 3.4 (L) 11/01/2022     11/01/2022    CO2 27 11/01/2022     (H) 11/01/2022     (H) 11/01/2022    CREATININE 4.7 (H) 11/01/2022    CALCIUM 8.3 (L) 11/01/2022    PROT 4.6 (L) 11/01/2022    ALBUMIN 2.5 (L) 11/01/2022    BILITOT 0.4 11/01/2022    ALKPHOS 65 11/01/2022    AST 18 11/01/2022    ALT 19 11/01/2022    ANIONGAP 13 11/01/2022    EGFRNONAA 44 (A) 04/18/2022       Pertinent Medications:     Current Facility-Administered Medications:     0.9%  NaCl infusion (for blood administration), , Intravenous, Q24H PRN, Madie Lancaster MD    0.9%  NaCl infusion (for blood administration), , Intravenous, Q24H PRN, Madie Lancaster MD    acetaminophen tablet 650 mg, 650 mg, Oral, Q4H PRN, Magui Cage MD, 650 mg at 11/01/22 1045    [START ON 11/2/2022] albumin human 5% bottle 50 g, 50 g, Intravenous, Once, Francisco J Jackson MD    albuterol-ipratropium 2.5 mg-0.5 mg/3 mL nebulizer solution 3 mL, 3 mL, Nebulization, Q4H PRN, Kandace Smith PA-C, 3 mL at 10/23/22 2206    aluminum-magnesium hydroxide-simethicone 200-200-20 mg/5 mL suspension 30 mL, 30 mL, Oral, QID PRN, Kandace Smith PA-C    amLODIPine tablet 10 mg, 10 mg, Per NG tube, Daily, Magui Cage MD, 10 mg at 11/01/22 0855    atovaquone 750 mg/5 mL oral liquid 1,500 mg, 1,500 mg, Per NG tube, Daily, Magui Cage MD, 1,500 mg at 11/01/22 0856    bisacodyL suppository 10 mg, 10 mg, Rectal, Daily PRN, Kandace Smith PA-C    [START ON 11/2/2022] calcium gluconate 1 g in NS IVPB (premixed), 2 g, Intravenous, Once, Francisco J Jackson MD    carvediloL tablet 6.25 mg, 6.25 mg, Per NG tube, BID, Ethan De Jesus MD, 6.25 mg at 11/01/22 0855    cloNIDine tablet 0.2 mg, 0.2 mg, Oral, Q4H PRN, Madie Lancaster MD, 0.2 mg at 11/01/22 1220    dextrose 10% bolus  125 mL, 12.5 g, Intravenous, PRN, Immanuel Peres MD    dextrose 10% bolus 250 mL, 25 g, Intravenous, PRN, Immanuel Peres MD    [START ON 11/2/2022] diphenhydrAMINE injection 50 mg, 50 mg, Intravenous, Once, Francisco J Jackson MD    glucagon (human recombinant) injection 1 mg, 1 mg, Intramuscular, PRN, Magui Cage MD    glucose chewable tablet 16 g, 16 g, Oral, PRN, Kandace Smith PA-C    glucose chewable tablet 24 g, 24 g, Oral, PRN, Kandace Smith PA-C    [START ON 11/2/2022] heparin (porcine) injection 3,200 Units, 3,200 Units, Intravenous, Once, Francisco J Jacskon MD    hydrALAZINE injection 10 mg, 10 mg, Intravenous, Q8H PRN, Magui Cage MD, 10 mg at 11/01/22 0852    insulin aspart U-100 pen 1-10 Units, 1-10 Units, Subcutaneous, Q6H PRN, Magui Cage MD, 4 Units at 11/01/22 1237    levothyroxine tablet 25 mcg, 25 mcg, Per NG tube, Before breakfast, Magui Cage MD, 25 mcg at 11/01/22 0505    lisinopriL tablet 40 mg, 40 mg, Per NG tube, Daily, Magui Cage MD, 40 mg at 11/01/22 0855    melatonin tablet 6 mg, 6 mg, Oral, Nightly PRN, Kandace Smith PA-C, 6 mg at 10/31/22 2039    methocarbamoL tablet 500 mg, 500 mg, Oral, TID PRN, Kandace Smith PA-C, 500 mg at 10/30/22 0058    methylPREDNISolone sodium succinate injection 24 mg, 24 mg, Intravenous, Daily, Magui Cage MD, 24 mg at 11/01/22 0855    naloxone 0.4 mg/mL injection 0.02 mg, 0.02 mg, Intravenous, PRN, Kandace Smith PA-C    ondansetron disintegrating tablet 8 mg, 8 mg, Oral, Q8H PRN, Kandace Smith PA-C, 8 mg at 10/31/22 0606    pantoprazole suspension 40 mg, 40 mg, Per NG tube, Daily, Ethan De Jesus MD, 40 mg at 11/01/22 0855    prochlorperazine injection Soln 5 mg, 5 mg, Intravenous, Q6H PRN, Kandace Smith PA-C, 5 mg at 10/24/22 0001    QUEtiapine tablet 25 mg, 25 mg, Oral, QHS, Jonathon Dupree MD, 25 mg at 10/31/22 2039    simethicone chewable tablet 80 mg, 1 tablet, Oral, QID PRN, Kandace  EULOGIO Smith PA-C, 80 mg at 10/22/22 1133    sodium chloride 0.9% 250 mL flush bag, , Intravenous, 1 time in Clinic/HOD, Woo Palacio MD, Held at 10/27/22 1700    sodium chloride 0.9% flush 10 mL, 10 mL, Intravenous, PRN, Woo Palacio MD    sodium chloride 0.9% flush 5 mL, 5 mL, Intravenous, PRN, Kandace Smith PA-C    Facility-Administered Medications Ordered in Other Encounters:     celecoxib capsule 400 mg, 400 mg, Oral, Once, Dieudonne Marshall MD    fentaNYL 50 mcg/mL injection  mcg,  mcg, Intravenous, PRN, Dieudonne Marshall MD, 100 mcg at 12/21/21 0919    LIDOcaine (PF) 10 mg/ml (1%) injection 10 mg, 1 mL, Intradermal, Once PRN, Dieudonne Marshall MD    LIDOcaine (PF) 10 mg/ml (1%) injection 10 mg, 1 mL, Intradermal, Once, Dieudonne Marshall MD    midazolam (VERSED) 1 mg/mL injection 0.5-4 mg, 0.5-4 mg, Intravenous, PRN, Dieudonne Marshall MD, 2 mg at 12/21/21 0919    ropivacaine 0.2% [facility] PainPRO Pump infusion 500 ML, , Perineural, Continuous, Dieudonne Marshall MD, New Bag at 12/21/21 1153       Review of patient's allergies indicates:   Allergen Reactions    Ampicillin     Peaches [peach (prunus persica)] Other (See Comments)     Pt unable to state type of reaction. Information obtained from daughter who states she was informed of allergy from patient.    Penicillins      Other reaction(s): Hives    Sulfa (sulfonamide antibiotics) Rash and Hives       Anesthesia: None     Technical Procedures Used: Plasma Exchange: Volume exchanged - 2.5 Liters; Replacement fluid - Albumin/Fresh Frozen Plasma; Number of procedures 5; Date of next procedure 11/2.    Description of the Findings of the Procedure:     Please see Apheresis Nurse flowsheet for details.    The patient was evaluated and all clinical and laboratory data relevant to the treatment was reviewed, and a decision was made to proceed with the Apheresis procedure.    I was available to the clinical staff  throughout the procedure.    Significant Surgical Tasks Conducted by the Assistant(s): Not applicable    Complications: None    Estimated Blood Loss (EBL): None    Implants: None     Specimens: None    Francisco J Jackson M.D., M.S., [facility]  Medical Director  Section of Transfusion Medicine & Blood Donor Services  Department of Pathology and Laboratory Medicine  [facility]  [phone] (Blood Bank)  11/01/2022

## 2022-11-01 NOTE — PROGRESS NOTES
"[facility] ([place] [address])  Rheumatology  Progress Note    Patient Name: Kristin Goodman  MRN: [id number]  Admission Date: 10/17/2022  Hospital Length of Stay: 15 days  Code Status: Full Code   Attending Provider: Ethan De Jesus MD  Primary Care Physician: Lori Hernandez MD  Principal Problem: Microscopic polyangiitis    Subjective:     HPI: Patient is a 78 yo F with chronic diastolic heart failure, HTN, OA, who is admitted for respiratory failure. Rheumatology is consulted due to concern for vasculitis. Patient initially presented to the ED on 9/24/22 complaining of a week of cough followed by an episode of hemoptysis on 9/24 prompting the ED visit. They did a CTA which showed multifocal PNA. She was sent home with Levuin. She followed up with PCP on 10/4/22 and was feeling better. Then she presented to the ED again on 10/14 and on 10/17 complaining of dyspnea. She had fevers in the few days leading up to admission of 102. She endorsed weakness, productive cough, dyspnea, and loss of appetite. Pt initally at 88% O2 saturation and improved to 98% on 2L NC. She was admitted for IV antibiotics. CT chest with contrast on 10/17 showed evidence of interval progression of bilateral perihilar dense consolidation with peripheral patchy areas of ground-glass opacification nonspecific as to the etiology.  Bilateral pneumonia as well as inflammatory etiologies considered.  Cardiogenic pulmonary edema considered less likely given the pattern.    On 10/19/22 she started having melena. Hb dropped to 6. She got 2 units pRBCs. GI was consulted. They noted "Colonoscopy in 2020 showed internal hemorrhoids and mild sigmoid diverticulosis. EGD in 2012 showed gastritis, biopsies positive for H. Pylori." "We considered doing EGD now, but from a pulmonary standpoint I do not think she is stable enough with the current pneumonia, therefore would recommend that we just follow the patient, treat with a PPI in case there " "is any peptic ulcer disease."    On 10/21/22 ENT was consulted due to left sided otalgia the day prior which resolved. She also was complaining of cervical adenopathy and sore throat. They did not recommend surgical intervention and felt that adenopathy was likely reactive.     On 10/23/22 ID was consulted. They recommended checking respiratory infection panel and fungal markers.    On 10/23/22 Nephrology was consulted due to worsening creatinine. They noted, "Patient is a noted to have baseline creatinine of 1 as recent as 8/2022 but progressive worsening of creatinine in early October.  On admission patient with creatinine of 1.6 (10/17) with subsequent progression and creatinine of 3.0 at time of consultation (10/23).  Nephrology consulted for acute kidney injury." "Patient with ATN nephritic picture in urine sediment, prot/crea ratio pending. Had hematuria in recent past but in context with positive urine cultures. Now definitely more dysmorphic red cells that wbcs in the urine. 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    On 10/23/22, she was transferred to ICU due to desaturations and respiratory distress. Pulmonologist noted that her respiratory status is too tenuous for bronchoscopy. She was stabilized with non-invasive ventilation and nasal cannula oxygen.      Interval History: No new complaints today. Still getting NGT feeds. Did not get PLEX yesterday as planned    Current Facility-Administered Medications   Medication Frequency    0.9%  NaCl infusion (for blood administration) Q24H PRN    0.9%  NaCl infusion (for blood administration) Q24H PRN    acetaminophen tablet 650 mg Q4H PRN    albumin human 5% bottle 50 g Once    albuterol-ipratropium 2.5 mg-0.5 mg/3 mL nebulizer solution 3 mL Q4H PRN    aluminum-magnesium hydroxide-simethicone 200-200-20 mg/5 mL suspension 30 mL QID PRN "    amLODIPine tablet 10 mg Daily    atovaquone 750 mg/5 mL oral liquid 1,500 mg Daily    bisacodyL suppository 10 mg Daily PRN    calcium gluconate 1 g in sodium chloride 0.9% 100 mL IVPB Once    carvediloL tablet 6.25 mg BID    cloNIDine tablet 0.2 mg Q4H PRN    dextrose 10% bolus 125 mL PRN    dextrose 10% bolus 250 mL PRN    diphenhydrAMINE injection 50 mg Once    glucagon (human recombinant) injection 1 mg PRN    glucose chewable tablet 16 g PRN    glucose chewable tablet 24 g PRN    heparin (porcine) injection 3,200 Units Once    hydrALAZINE injection 10 mg Q8H PRN    insulin aspart U-100 pen 1-10 Units Q6H PRN    levothyroxine tablet 25 mcg Before breakfast    lisinopriL tablet 40 mg Daily    melatonin tablet 6 mg Nightly PRN    methocarbamoL tablet 500 mg TID PRN    methylPREDNISolone sodium succinate injection 24 mg Daily    naloxone 0.4 mg/mL injection 0.02 mg PRN    ondansetron disintegrating tablet 8 mg Q8H PRN    pantoprazole suspension 40 mg Daily    prochlorperazine injection Soln 5 mg Q6H PRN    QUEtiapine tablet 25 mg QHS    simethicone chewable tablet 80 mg QID PRN    sodium chloride 0.9% 250 mL flush bag 1 time in Clinic/HOD    sodium chloride 0.9% flush 10 mL PRN    sodium chloride 0.9% flush 5 mL PRN     Facility-Administered Medications Ordered in Other Encounters   Medication Frequency    celecoxib capsule 400 mg Once    fentaNYL 50 mcg/mL injection  mcg PRN    LIDOcaine (PF) 10 mg/ml (1%) injection 10 mg Once PRN    LIDOcaine (PF) 10 mg/ml (1%) injection 10 mg Once    midazolam (VERSED) 1 mg/mL injection 0.5-4 mg PRN    ropivacaine 0.2% [facility] PainPRO Pump infusion 500 ML Continuous     Objective:     Vital Signs (Most Recent):  Temp: 98.8 °F (37.1 °C) (11/01/22 1056)  Pulse: 78 (11/01/22 1111)  Resp: (!) 21 (11/01/22 1056)  BP: (!) 177/79 (11/01/22 1056)  SpO2: 100 % (11/01/22 1111)  O2 Device (Oxygen Therapy): nasal cannula (11/01/22 0412)   Vital Signs (24h Range):  Temp:  [97.1 °F  (36.2 °C)-98.8 °F (37.1 °C)] 98.8 °F (37.1 °C)  Pulse:  [62-86] 78  Resp:  [13-22] 21  SpO2:  [90 %-100 %] 100 %  BP: (142-185)/(61-81) 177/79     Weight: 63.5 kg (139 lb 15.9 oz) (10/27/22 1334)  Body mass index is 26.45 kg/m².  Body surface area is 1.65 meters squared.      Intake/Output Summary (Last 24 hours) at 11/1/2022 1211  Last data filed at 11/1/2022 0621  Gross per 24 hour   Intake 235 ml   Output 750 ml   Net -515 ml       Physical Exam   Constitutional: No distress.   HENT:   Head: Normocephalic and atraumatic.   Nose: No rhinorrhea or nasal congestion.   Mouth/Throat: Oropharynx is clear.   No tenderness to palpation of sinuses. NGT in place   Ears: No tenderness to palpation     Eyes: Pupils are equal, round, and reactive to light.   Cardiovascular: Normal rate and regular rhythm.   No murmur heard.  Pulmonary/Chest: Effort normal and breath sounds normal. No respiratory distress. She has no wheezes.   Abdominal: Soft. Bowel sounds are normal. There is no abdominal tenderness.   Musculoskeletal:         General: No deformity. Normal range of motion.      Cervical back: Normal range of motion.   Neurological: She is alert.   Skin: Skin is warm and dry.   Psychiatric: Affect and judgment normal.     Significant Labs:  All pertinent lab results from the last 24 hours have been reviewed.    Significant Imaging:  Imaging results within the past 24 hours have been reviewed.    Assessment/Plan:     Acute hypoxemic respiratory failure  Patient is a 78 yo F with chronic diastolic heart failure, HTN, OA, who is admitted for respiratory failure. Rheumatology is consulted due to concern for vasculitis. Patient presented with cough and hemoptysis since 9/24/22. She was admitted due to dyspnea and hypoxia on 10/17. She has had Hb drop to 6 this admission requiring blood transfusions. Reviewed her chest imaging which shows progressive disease from 10/14 until now which can be concerning for DAH. This might also  explain the Hb drop. No bronch planned for now due to her tenuous respiratory status. GI defers invasive workup for now because she is not stable enough. She has had increasing creatinine from baseline of 1 to 3.0 on 10/23/22. Nephrology concerned for ATN nephritic picture in urine sediment. Pending kidney biopsy result.    UA: 1+ blood, 88 RBCs, 18 WBCs  UPCR 1.54  RF and CCP negative  MITUL+ 1:2560 homogenous, +dsDNA, complements normal  PR3 slightly elevated at 1.2 (reference positive is 1.0)  MPO elevated at 132  C-ANCA+ 1:80  P-ANCA-  GBM antibodies negative  Quantiferon indeterminate  T-Spot Negative    Pending: cryoglobulins    Treating empirically for ANCA vasculitis while awaiting kidney biopsy results:  - s/p IV Solumedrol 1000 mg x3 doses. Now following PEXIVAS steroid taper. Currently on IV Solumedrol 24 mg daily.  - PLEX 10/26, 10/27, 10/29. Next scheduled for 10/30. Total 7 treatments planned.  - Got SLED 10/27. Bedside HD 10/30  - Rituximab 375 mg/m^2 (600 mg) on 10/27 (every 7 day dose 1 of 4)  - Cyclophosphamide 7.5 mg/kg on 10/27 (every 14 day dose 1 of 2)    Recommendations:  Continue steroid taper per PEXIVAS trial as in the chart below. Currently IV Solu-Medrol 24 mg daily (prednisone 30 mg daily equivalent). Started on 10/30  Atovaquone 1500 mg and Protonix daily while on high dose steroids.  Next Rituximab 275 mg/m^2 due on November 3  Next cyclophosphamide 7.5 mg/kg due on November 10  Monitor CBC and CMP in 10-14 days after giving chemotherapy  Follow up kidney biopsy              Thank you for this consult. These are preliminary recommendations. Please follow attending recommendations. Please message with any questions or concerns.         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   ? avacopan  Atovaquone 1500 mg daily  Pantoprazole  *Will need Evusheld given rituximab     Jason Woods MD  Rheumatology  [phone]

## 2022-11-01 NOTE — PROGRESS NOTES
[facility] ([place] [address])  Nephrology  Progress Note    Patient Name: Kristin Goodman  MRN: [id number]  Admission Date: 10/17/2022  Hospital Length of Stay: 15 days  Attending Provider: Ethan De Jesus MD   Primary Care Physician: Lori Hernandez MD  Principal Problem:Microscopic polyangiitis    Subjective:     HPI: Kristin Goodman is a 77 year old female with hypertension, hypothyroidism, HFpEF who presents for cough, shortness of breath, and weakness.  Patient initially presented to ER on 09/24 with hemoptysis and imaging concerning for multilobar pneumonia at which time she was discharged on a 10 day course of levofloxacin.  Patient initially had some improvement but began having worsening symptoms on 10/14.  Patient describes multiple fevers and progressive shortness of breath.  She continues to have intermittent hemoptysis.  Patient with worsening leukocytosis and limited clinical improvement despite broad-spectrum antibiotics.  She remains on cefepime.  Patient is a noted to have baseline creatinine of 1 as recent as 8/2022 but progressive worsening of creatinine in early October.  On admission patient with creatinine of 1.6 (10/17) with subsequent progression and creatinine of 3.0 at time of consultation (10/23).  Nephrology consulted for acute kidney injury.      Interval History: NAEON. Patient well appearing this morning, awake and alert and responding to questions. Appears stronger and with more energy today.     Review of patient's allergies indicates:   Allergen Reactions    Ampicillin     Peaches [peach (prunus persica)] Other (See Comments)     Pt unable to state type of reaction. Information obtained from daughter who states she was informed of allergy from patient.    Penicillins      Other reaction(s): Hives    Sulfa (sulfonamide antibiotics) Rash and Hives     Current Facility-Administered Medications   Medication Frequency    0.9%  NaCl infusion (for blood administration) Q24H  PRN    0.9%  NaCl infusion (for blood administration) Q24H PRN    acetaminophen tablet 650 mg Q4H PRN    [START ON 11/2/2022] albumin human 5% bottle 50 g Once    albuterol-ipratropium 2.5 mg-0.5 mg/3 mL nebulizer solution 3 mL Q4H PRN    aluminum-magnesium hydroxide-simethicone 200-200-20 mg/5 mL suspension 30 mL QID PRN    amLODIPine tablet 10 mg Daily    atovaquone 750 mg/5 mL oral liquid 1,500 mg Daily    bisacodyL suppository 10 mg Daily PRN    [START ON 11/2/2022] calcium gluconate 1 g in NS IVPB (premixed) Once    carvediloL tablet 6.25 mg BID    cloNIDine tablet 0.2 mg Q4H PRN    dextrose 10% bolus 125 mL PRN    dextrose 10% bolus 250 mL PRN    [START ON 11/2/2022] diphenhydrAMINE injection 50 mg Once    glucagon (human recombinant) injection 1 mg PRN    glucose chewable tablet 16 g PRN    glucose chewable tablet 24 g PRN    [START ON 11/2/2022] heparin (porcine) injection 3,200 Units Once    hydrALAZINE injection 10 mg Q8H PRN    insulin aspart U-100 pen 1-10 Units Q6H PRN    levothyroxine tablet 25 mcg Before breakfast    lisinopriL tablet 40 mg Daily    melatonin tablet 6 mg Nightly PRN    methocarbamoL tablet 500 mg TID PRN    methylPREDNISolone sodium succinate injection 24 mg Daily    naloxone 0.4 mg/mL injection 0.02 mg PRN    ondansetron disintegrating tablet 8 mg Q8H PRN    pantoprazole suspension 40 mg Daily    prochlorperazine injection Soln 5 mg Q6H PRN    QUEtiapine tablet 25 mg QHS    simethicone chewable tablet 80 mg QID PRN    sodium chloride 0.9% 250 mL flush bag 1 time in Clinic/HOD    sodium chloride 0.9% flush 10 mL PRN    sodium chloride 0.9% flush 5 mL PRN     Facility-Administered Medications Ordered in Other Encounters   Medication Frequency    celecoxib capsule 400 mg Once    fentaNYL 50 mcg/mL injection  mcg PRN    LIDOcaine (PF) 10 mg/ml (1%) injection 10 mg Once PRN    LIDOcaine (PF) 10 mg/ml (1%) injection 10 mg Once    midazolam  (VERSED) 1 mg/mL injection 0.5-4 mg PRN    ropivacaine 0.2% [facility] PainPRO Pump infusion 500 ML Continuous       Objective:     Vital Signs (Most Recent):  Temp: 97.8 °F (36.6 °C) (11/01/22 1415)  Pulse: 78 (11/01/22 1536)  Resp: 18 (11/01/22 1415)  BP: 133/60 (11/01/22 1415)  SpO2: 99 % (11/01/22 1536)  O2 Device (Oxygen Therapy): nasal cannula (11/01/22 1415)   Vital Signs (24h Range):  Temp:  [97 °F (36.1 °C)-98.9 °F (37.2 °C)] 97.8 °F (36.6 °C)  Pulse:  [62-93] 78  Resp:  [13-22] 18  SpO2:  [89 %-100 %] 99 %  BP: (133-185)/(59-81) 133/60     Weight: 63 kg (139 lb) (11/01/22 1245)  Body mass index is 26.26 kg/m².  Body surface area is 1.65 meters squared.    I/O last 3 completed shifts:  In: 7717 [Blood:6131; NG/GT:1586]  Out: 1215 [Urine:1000; Drains:15; Stool:200]    Physical Exam  Vitals and nursing note reviewed.   Constitutional:       General: She is not in acute distress.     Appearance: She is well-developed. She is ill-appearing. She is not toxic-appearing.      Comments: Patient awake and alert   HENT:      Head: Normocephalic and atraumatic.      Mouth/Throat:      Mouth: Mucous membranes are dry.   Eyes:      Conjunctiva/sclera: Conjunctivae normal.   Cardiovascular:      Rate and Rhythm: Normal rate and regular rhythm.      Heart sounds: Normal heart sounds.   Pulmonary:      Effort: Pulmonary effort is normal. No respiratory distress.      Breath sounds: No wheezing or rales.      Comments: Coarse breath sounds bilaterally  Abdominal:      General: Bowel sounds are normal. There is no distension.      Palpations: Abdomen is soft.      Tenderness: There is no abdominal tenderness.   Musculoskeletal:         General: No tenderness. Normal range of motion.      Cervical back: Normal range of motion and neck supple.      Right lower leg: No edema.      Left lower leg: No edema.   Lymphadenopathy:      Cervical: No cervical adenopathy.   Skin:     General: Skin is warm and dry.      Capillary Refill:  Capillary refill takes less than 2 seconds.      Findings: No rash.   Neurological:      General: No focal deficit present.      Mental Status: She is alert and oriented to person, place, and time.       Significant Labs:  CBC:   Recent Labs   Lab 11/01/22 0412   WBC 27.35*   RBC 2.81*   HGB 8.0*   HCT 25.2*      MCV 90   MCH 28.5   MCHC 31.7*       CMP:   Recent Labs   Lab 11/01/22 0412   *   CALCIUM 8.3*   ALBUMIN 2.5*   PROT 4.6*   *   K 3.4*   CO2 27      *   CREATININE 4.7*   ALKPHOS 65   ALT 19   AST 18   BILITOT 0.4       All labs within the past 24 hours have been reviewed.     Significant Imaging:       Assessment/Plan:     Hypernatremia  Patient with increasing sodium, 150 on 11/1. Calculated free water deficit 2.0L, though likely higher if including insensible losses.   -- FW boluses 400cc 16h via NGT  -- SLP evals as patient gets stronger to assess swallowing ability     Acute renal failure superimposed on stage 3 chronic kidney disease  Patient with baseline creatinine of 1 as recent as August 2022 with progressive worsening beginning in early October 1.3 (10/13)->1.6 (10/17)->3.0 (10/23) despite IV fluids.  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  Patient noted to have new onset proteinuria and hematuria over the last 1 month.  Additionally, would consider ATN in the setting of suspected sepsis from multifocal pneumonia.     Concerns for glomerulonephritis given patient's current clinical picture. Initial urine microscopy demonstrating muddy brown casts with likely acanthocytes noted as well. MITUL positive, proteinase 3 ab weakly positive, MPO strongly positive. Patient s/p high-dose IV solumedrol x3 doses, now on Solumedrol 50mg qd. Underwent kidney bx 10/27. Rheumatology consulted, and patient started on Plex, Ritux/cytoxan. Patient has received 2 rounds of Plex, as well as 1 dose of Ritux and  cytoxan on 10/27. Given continually worsening renal function and uremic encephalopathy, patient underwent SLED without complication on 10/27. Transferred to floor on 10/29. Unable to tolerate HD on 10/29 due to tachycardia and anxiety. However, no urgent need for dialysis since that time. Significantly improved mentation on 10/31. Will plan for HD session 11/1 for clearance. Patient also with increasing hypernatremia so will add FWF to tube feeds.     Recommendations:  - planning for HD session today  - s/p Plex #5 today, next round tomorrow  - f/u kidney bx results   - continue plex, Ritux/cytoxan per rheum (next dose of ritux and cytoxan on 11/3)  - continue steroid taper  - strict intake and output  - renally dose medications and avoid nephrotoxins when possible  - replete electrolytes as appropriate        Thank you for your consult. I will follow-up with patient. Please contact us if you have any additional questions.    Bipin Frias MD  Nephrology  [facility] ([place]

## 2022-11-01 NOTE — ASSESSMENT & PLAN NOTE
Patient with increasing sodium, 150 on 11/1. Calculated free water deficit 2.0L, though likely higher if including insensible losses.   -- FW boluses 400cc 16h via NGT  -- SLP evals as patient gets stronger to assess swallowing ability

## 2022-11-01 NOTE — SUBJECTIVE & OBJECTIVE
Interval History: NAEON. Patient well appearing this morning, awake and alert and responding to questions. Appears stronger and with more energy today.     Review of patient's allergies indicates:   Allergen Reactions    Ampicillin     Peaches [peach (prunus persica)] Other (See Comments)     Pt unable to state type of reaction. Information obtained from daughter who states she was informed of allergy from patient.    Penicillins      Other reaction(s): Hives    Sulfa (sulfonamide antibiotics) Rash and Hives     Current Facility-Administered Medications   Medication Frequency    0.9%  NaCl infusion (for blood administration) Q24H PRN    0.9%  NaCl infusion (for blood administration) Q24H PRN    acetaminophen tablet 650 mg Q4H PRN    [START ON 11/2/2022] albumin human 5% bottle 50 g Once    albuterol-ipratropium 2.5 mg-0.5 mg/3 mL nebulizer solution 3 mL Q4H PRN    aluminum-magnesium hydroxide-simethicone 200-200-20 mg/5 mL suspension 30 mL QID PRN    amLODIPine tablet 10 mg Daily    atovaquone 750 mg/5 mL oral liquid 1,500 mg Daily    bisacodyL suppository 10 mg Daily PRN    [START ON 11/2/2022] calcium gluconate 1 g in NS IVPB (premixed) Once    carvediloL tablet 6.25 mg BID    cloNIDine tablet 0.2 mg Q4H PRN    dextrose 10% bolus 125 mL PRN    dextrose 10% bolus 250 mL PRN    [START ON 11/2/2022] diphenhydrAMINE injection 50 mg Once    glucagon (human recombinant) injection 1 mg PRN    glucose chewable tablet 16 g PRN    glucose chewable tablet 24 g PRN    [START ON 11/2/2022] heparin (porcine) injection 3,200 Units Once    hydrALAZINE injection 10 mg Q8H PRN    insulin aspart U-100 pen 1-10 Units Q6H PRN    levothyroxine tablet 25 mcg Before breakfast    lisinopriL tablet 40 mg Daily    melatonin tablet 6 mg Nightly PRN    methocarbamoL tablet 500 mg TID PRN    methylPREDNISolone sodium succinate injection 24 mg Daily    naloxone 0.4 mg/mL injection 0.02 mg PRN    ondansetron disintegrating tablet 8 mg Q8H PRN     pantoprazole suspension 40 mg Daily    prochlorperazine injection Soln 5 mg Q6H PRN    QUEtiapine tablet 25 mg QHS    simethicone chewable tablet 80 mg QID PRN    sodium chloride 0.9% 250 mL flush bag 1 time in Clinic/HOD    sodium chloride 0.9% flush 10 mL PRN    sodium chloride 0.9% flush 5 mL PRN     Facility-Administered Medications Ordered in Other Encounters   Medication Frequency    celecoxib capsule 400 mg Once    fentaNYL 50 mcg/mL injection  mcg PRN    LIDOcaine (PF) 10 mg/ml (1%) injection 10 mg Once PRN    LIDOcaine (PF) 10 mg/ml (1%) injection 10 mg Once    midazolam (VERSED) 1 mg/mL injection 0.5-4 mg PRN    ropivacaine 0.2% [facility] PainPRO Pump infusion 500 ML Continuous       Objective:     Vital Signs (Most Recent):  Temp: 97.8 °F (36.6 °C) (11/01/22 1415)  Pulse: 78 (11/01/22 1536)  Resp: 18 (11/01/22 1415)  BP: 133/60 (11/01/22 1415)  SpO2: 99 % (11/01/22 1536)  O2 Device (Oxygen Therapy): nasal cannula (11/01/22 1415)   Vital Signs (24h Range):  Temp:  [97 °F (36.1 °C)-98.9 °F (37.2 °C)] 97.8 °F (36.6 °C)  Pulse:  [62-93] 78  Resp:  [13-22] 18  SpO2:  [89 %-100 %] 99 %  BP: (133-185)/(59-81) 133/60     Weight: 63 kg (139 lb) (11/01/22 1245)  Body mass index is 26.26 kg/m².  Body surface area is 1.65 meters squared.    I/O last 3 completed shifts:  In: 7717 [Blood:6131; NG/GT:1586]  Out: 1215 [Urine:1000; Drains:15; Stool:200]    Physical Exam  Vitals and nursing note reviewed.   Constitutional:       General: She is not in acute distress.     Appearance: She is well-developed. She is ill-appearing. She is not toxic-appearing.      Comments: Patient awake and alert   HENT:      Head: Normocephalic and atraumatic.      Mouth/Throat:      Mouth: Mucous membranes are dry.   Eyes:      Conjunctiva/sclera: Conjunctivae normal.   Cardiovascular:      Rate and Rhythm: Normal rate and regular rhythm.      Heart sounds: Normal heart sounds.   Pulmonary:      Effort: Pulmonary effort is normal. No  respiratory distress.      Breath sounds: No wheezing or rales.      Comments: Coarse breath sounds bilaterally  Abdominal:      General: Bowel sounds are normal. There is no distension.      Palpations: Abdomen is soft.      Tenderness: There is no abdominal tenderness.   Musculoskeletal:         General: No tenderness. Normal range of motion.      Cervical back: Normal range of motion and neck supple.      Right lower leg: No edema.      Left lower leg: No edema.   Lymphadenopathy:      Cervical: No cervical adenopathy.   Skin:     General: Skin is warm and dry.      Capillary Refill: Capillary refill takes less than 2 seconds.      Findings: No rash.   Neurological:      General: No focal deficit present.      Mental Status: She is alert and oriented to person, place, and time.       Significant Labs:  CBC:   Recent Labs   Lab 11/01/22 0412   WBC 27.35*   RBC 2.81*   HGB 8.0*   HCT 25.2*      MCV 90   MCH 28.5   MCHC 31.7*       CMP:   Recent Labs   Lab 11/01/22 0412   *   CALCIUM 8.3*   ALBUMIN 2.5*   PROT 4.6*   *   K 3.4*   CO2 27      *   CREATININE 4.7*   ALKPHOS 65   ALT 19   AST 18   BILITOT 0.4       All labs within the past 24 hours have been reviewed.     Significant Imaging:

## 2022-11-01 NOTE — RESPIRATORY THERAPY
RAPID RESPONSE RESPIRATORY CHART CHECK       Chart check completed, Please call [phone] for further concerns or assistance.

## 2022-11-01 NOTE — ASSESSMENT & PLAN NOTE
Patient with baseline creatinine of 1 as recent as August 2022 with progressive worsening beginning in early October 1.3 (10/13)->1.6 (10/17)->3.0 (10/23) despite IV fluids.  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  Patient noted to have new onset proteinuria and hematuria over the last 1 month.  Additionally, would consider ATN in the setting of suspected sepsis from multifocal pneumonia.     Concerns for glomerulonephritis given patient's current clinical picture. Initial urine microscopy demonstrating muddy brown casts with likely acanthocytes noted as well. MITUL positive, proteinase 3 ab weakly positive, MPO strongly positive. Patient s/p high-dose IV solumedrol x3 doses, now on Solumedrol 50mg qd. Underwent kidney bx 10/27. Rheumatology consulted, and patient started on Plex, Ritux/cytoxan. Patient has received 2 rounds of Plex, as well as 1 dose of Ritux and cytoxan on 10/27. Given continually worsening renal function and uremic encephalopathy, patient underwent SLED without complication on 10/27. Transferred to floor on 10/29. Unable to tolerate HD on 10/29 due to tachycardia and anxiety. However, no urgent need for dialysis since that time. Significantly improved mentation on 10/31. Will plan for HD session 11/1 for clearance. Patient also with increasing hypernatremia so will add FWF to tube feeds.     Recommendations:  - planning for HD session today  - s/p Plex #5 today, next round tomorrow  - f/u kidney bx results   - continue plex, Ritux/cytoxan per rheum (next dose of ritux and cytoxan on 11/3)  - continue steroid taper  - strict intake and output  - renally dose medications and avoid nephrotoxins when possible  - replete electrolytes as appropriate

## 2022-11-01 NOTE — PLAN OF CARE
Pt AAOx2. Reoriented to time and situation. 2L O2 per NC. MARGUERITE midline flushed and saline locked. R IJ trialysis intact. NG tube infusing Novasource Renal @35ml/hr. Purewick intact w/ carlton color urine. Fecal incontinence tube intact w/ brown stool draining. Accuchecks continue Q6H. Bed in lowest position, wheels locked, side rails up x3, and call light within reach. No c/o pain voiced/expressed. No s/s of distress noted. Monitoring continues.

## 2022-11-01 NOTE — AI DETERIORATION ALERT
"RAPID RESPONSE NURSE AI ALERT       AI alert received.    Chart Reviewed: 2022, 930    MRN: [id number]  Bed: 34142/36453 A    Dx: Microscopic polyangiitis    Kristin Goodman has a past medical history of Acute blood loss anemia, Acute hypoxemic respiratory failure, Allergy, Back pain, Chronic diastolic heart failure, Chronic diastolic heart failure, Colon polyp, Disorder of kidney and ureter, H/O Bell's palsy, Helicobacter pylori (H. pylori), HTN (hypertension), Hypothyroid, OA (osteoarthritis), DONAVAN (obstructive sleep apnea), Pneumonia due to other staphylococcus, Pulmonary HTN, Sepsis due to pneumonia, Septic shock, Trouble in sleeping, and Urinary incontinence.    Last VS: BP (!) 177/79   Pulse 78   Temp 98.8 °F (37.1 °C)   Resp (!) 21   Ht 5' 1" (1.549 m)   Wt 63.5 kg (139 lb 15.9 oz)   SpO2 100%   BMI 26.45 kg/m²     24H Vital Sign Range:  Temp:  [97.1 °F (36.2 °C)-98.8 °F (37.1 °C)]   Pulse:  [62-86]   Resp:  [13-22]   BP: (142-185)/(61-81)   SpO2:  [90 %-100 %]     Level of Consciousness (AVPU): alert    Recent Labs     10/30/22  0426 10/31/22  0224 22   WBC 46.38* 31.24* 27.35*   HGB 9.8* 9.6* 8.0*   HCT 30.1* 28.8* 25.2*    260 244       Recent Labs     10/30/22  2159 10/31/22  0224 22  0412   * 148*  149* 150*   K 4.4 3.5  3.5 3.4*    108  108 110   CO2 21* 27  27 27   CREATININE 4.3* 4.2*  4.2* 4.7*   * 116*  116* 160*   PHOS 5.7* 5.5*  5.4* 5.4*   MG 2.1 2.1  2.1 2.0        No results for input(s): PH, PCO2, PO2, HCO3, POCSATURATED, BE in the last 72 hours.     OXYGEN:  Flow (L/min): 2  Oxygen Concentration (%): 50  O2 Device (Oxygen Therapy): nasal cannula    MEWS score: 1    RAPID RESPONSE NURSE PROACTIVE ROUNDING NOTE       Time of Visit: 930    Admit Date: 10/17/2022  LOS: 15  Code Status: Full Code   Date of Visit: 2022  : 1945  Age: 77 y.o.  Sex: female  Race: Black or   Bed: 03023/32667 A:   MRN: [id number]  Was " the patient discharged from an ICU this admission? Yes   Was the patient discharged from a PACU within last 24 hours? No   Did the patient receive conscious sedation/general anesthesia in last 24 hours? No  Was the patient in the ED within the past 24 hours? No  Was the patient on NIPPV within the past 24 hours? No   Attending Physician: Ethan De Jesus MD  Primary Service: [facility]   Time spent at the bedside: < 15 min    SITUATION    Notified by Epic patient alert.  Reason for alert: Hypertension  Called to evaluate the patient for Circulatory    BACKGROUND     Why is the patient in the hospital?: Microscopic polyangiitis    Patient has a past medical history of Acute blood loss anemia, Acute hypoxemic respiratory failure, Allergy, Back pain, Chronic diastolic heart failure, Chronic diastolic heart failure, Colon polyp, Disorder of kidney and ureter, H/O Bell's palsy, Helicobacter pylori (H. pylori), HTN (hypertension), Hypothyroid, OA (osteoarthritis), DONAVAN (obstructive sleep apnea), Pneumonia due to other staphylococcus, Pulmonary HTN, Sepsis due to pneumonia, Septic shock, Trouble in sleeping, and Urinary incontinence.    Last Vitals:  Temp: 98.8 °F (37.1 °C) (11/01 1056)  Pulse: 78 (11/01 1111)  Resp: 21 (11/01 1056)  BP: 177/79 (11/01 1056)  SpO2: 100 % (11/01 1111)    24 Hours Vitals Range:  Temp:  [97.1 °F (36.2 °C)-98.8 °F (37.1 °C)]   Pulse:  [62-86]   Resp:  [13-22]   BP: (142-185)/(61-81)   SpO2:  [90 %-100 %]     Labs:  Recent Labs     10/30/22  0426 10/31/22  0224 11/01/22  0412   WBC 46.38* 31.24* 27.35*   HGB 9.8* 9.6* 8.0*   HCT 30.1* 28.8* 25.2*    260 244       Recent Labs     10/30/22  2159 10/31/22  0224 11/01/22  0412   * 148*  149* 150*   K 4.4 3.5  3.5 3.4*    108  108 110   CO2 21* 27  27 27   CREATININE 4.3* 4.2*  4.2* 4.7*   * 116*  116* 160*   PHOS 5.7* 5.5*  5.4* 5.4*   MG 2.1 2.1  2.1 2.0        No results for input(s): PH, PCO2, PO2, HCO3,  POCSATURATED, BE in the last 72 hours.     ASSESSMENT    Physical Exam  Constitutional:       Appearance: Normal appearance.   HENT:      Mouth/Throat:      Mouth: Mucous membranes are dry.   Eyes:      Pupils: Pupils are equal, round, and reactive to light.   Cardiovascular:      Rate and Rhythm: Normal rate and regular rhythm.      Comments: VSS  Pulmonary:      Effort: Pulmonary effort is normal.   Skin:     General: Skin is warm and dry.      Capillary Refill: Capillary refill takes 2 to 3 seconds.   Neurological:      Mental Status: She is alert and oriented to person, place, and time. Mental status is at baseline.   Psychiatric:         Mood and Affect: Mood normal.       INTERVENTIONS    The patient was seen for Cardiac problem. Staff concerns included hypertension. The following interventions were performed: No additional interventions needed at this time..    RECOMMENDATIONS    None at this time.     PROVIDER ESCALATION    Yes/No  no    Orders received and case discussed with NA.    Disposition: Remain in room 04703.    FOLLOW-UP    charge Kamilla COLUNGA  contacted. No concerns verbalized at this time. Instructed to call [phone] for further concerns or assistance.    Tami Hensley RN

## 2022-11-01 NOTE — PROGRESS NOTES
Apheresis tx started at 1250 without difficulty. Pt AAOx4.  2.5 Liter exchange.  Replacement fluids 1.5 FFP and 1L Albumin, 50mg Benadryl, 2g Calcium. Tx ended at 1400. Catheter clamped, flushed, and Hep locked per protocol. Pt tolerated tx well, VSS, NAD. Next tx on 11/2/2022. Daughter is bedside.

## 2022-11-01 NOTE — SUBJECTIVE & OBJECTIVE
Interval History: No new complaints today. Still getting NGT feeds. Did not get PLEX yesterday as planned    Current Facility-Administered Medications   Medication Frequency    0.9%  NaCl infusion (for blood administration) Q24H PRN    0.9%  NaCl infusion (for blood administration) Q24H PRN    acetaminophen tablet 650 mg Q4H PRN    albumin human 5% bottle 50 g Once    albuterol-ipratropium 2.5 mg-0.5 mg/3 mL nebulizer solution 3 mL Q4H PRN    aluminum-magnesium hydroxide-simethicone 200-200-20 mg/5 mL suspension 30 mL QID PRN    amLODIPine tablet 10 mg Daily    atovaquone 750 mg/5 mL oral liquid 1,500 mg Daily    bisacodyL suppository 10 mg Daily PRN    calcium gluconate 1 g in sodium chloride 0.9% 100 mL IVPB Once    carvediloL tablet 6.25 mg BID    cloNIDine tablet 0.2 mg Q4H PRN    dextrose 10% bolus 125 mL PRN    dextrose 10% bolus 250 mL PRN    diphenhydrAMINE injection 50 mg Once    glucagon (human recombinant) injection 1 mg PRN    glucose chewable tablet 16 g PRN    glucose chewable tablet 24 g PRN    heparin (porcine) injection 3,200 Units Once    hydrALAZINE injection 10 mg Q8H PRN    insulin aspart U-100 pen 1-10 Units Q6H PRN    levothyroxine tablet 25 mcg Before breakfast    lisinopriL tablet 40 mg Daily    melatonin tablet 6 mg Nightly PRN    methocarbamoL tablet 500 mg TID PRN    methylPREDNISolone sodium succinate injection 24 mg Daily    naloxone 0.4 mg/mL injection 0.02 mg PRN    ondansetron disintegrating tablet 8 mg Q8H PRN    pantoprazole suspension 40 mg Daily    prochlorperazine injection Soln 5 mg Q6H PRN    QUEtiapine tablet 25 mg QHS    simethicone chewable tablet 80 mg QID PRN    sodium chloride 0.9% 250 mL flush bag 1 time in Clinic/HOD    sodium chloride 0.9% flush 10 mL PRN    sodium chloride 0.9% flush 5 mL PRN     Facility-Administered Medications Ordered in Other Encounters   Medication Frequency    celecoxib capsule 400 mg Once    fentaNYL 50 mcg/mL injection  mcg PRN     LIDOcaine (PF) 10 mg/ml (1%) injection 10 mg Once PRN    LIDOcaine (PF) 10 mg/ml (1%) injection 10 mg Once    midazolam (VERSED) 1 mg/mL injection 0.5-4 mg PRN    ropivacaine 0.2% [facility] PainPRO Pump infusion 500 ML Continuous     Objective:     Vital Signs (Most Recent):  Temp: 98.8 °F (37.1 °C) (11/01/22 1056)  Pulse: 78 (11/01/22 1111)  Resp: (!) 21 (11/01/22 1056)  BP: (!) 177/79 (11/01/22 1056)  SpO2: 100 % (11/01/22 1111)  O2 Device (Oxygen Therapy): nasal cannula (11/01/22 0412)   Vital Signs (24h Range):  Temp:  [97.1 °F (36.2 °C)-98.8 °F (37.1 °C)] 98.8 °F (37.1 °C)  Pulse:  [62-86] 78  Resp:  [13-22] 21  SpO2:  [90 %-100 %] 100 %  BP: (142-185)/(61-81) 177/79     Weight: 63.5 kg (139 lb 15.9 oz) (10/27/22 1334)  Body mass index is 26.45 kg/m².  Body surface area is 1.65 meters squared.      Intake/Output Summary (Last 24 hours) at 11/1/2022 1211  Last data filed at 11/1/2022 0621  Gross per 24 hour   Intake 235 ml   Output 750 ml   Net -515 ml       Physical Exam   Constitutional: No distress.   HENT:   Head: Normocephalic and atraumatic.   Nose: No rhinorrhea or nasal congestion.   Mouth/Throat: Oropharynx is clear.   No tenderness to palpation of sinuses. NGT in place   Ears: No tenderness to palpation     Eyes: Pupils are equal, round, and reactive to light.   Cardiovascular: Normal rate and regular rhythm.   No murmur heard.  Pulmonary/Chest: Effort normal and breath sounds normal. No respiratory distress. She has no wheezes.   Abdominal: Soft. Bowel sounds are normal. There is no abdominal tenderness.   Musculoskeletal:         General: No deformity. Normal range of motion.      Cervical back: Normal range of motion.   Neurological: She is alert.   Skin: Skin is warm and dry.   Psychiatric: Affect and judgment normal.     Significant Labs:  All pertinent lab results from the last 24 hours have been reviewed.    Significant Imaging:  Imaging results within the past 24 hours have been reviewed.

## 2022-11-01 NOTE — SUBJECTIVE & OBJECTIVE
Interval History:   No events overnight.  Patient with no complaints this morning.  No dialysis today per Nephrology.  Planning for plasma exchange this afternoon per transfusion medicine.      Review of Systems   Constitutional:  Positive for appetite change and fatigue. Negative for activity change, chills, diaphoresis and fever.   HENT:  Negative for rhinorrhea and sore throat.    Respiratory:  Negative for cough, chest tightness and shortness of breath.    Cardiovascular:  Negative for chest pain and palpitations.   Gastrointestinal:  Negative for abdominal pain, constipation, diarrhea and nausea.   Endocrine: Negative for cold intolerance.   Genitourinary:  Negative for decreased urine volume and dysuria.   Musculoskeletal:  Negative for arthralgias and myalgias.   Skin:  Negative for rash and wound.   Neurological:  Positive for weakness. Negative for dizziness, numbness and headaches.   Psychiatric/Behavioral:  Negative for agitation, behavioral problems and confusion.    Objective:     Vital Signs (Most Recent):  Temp: 98.8 °F (37.1 °C) (11/01/22 1636)  Pulse: 77 (11/01/22 1636)  Resp: 18 (11/01/22 1636)  BP: 138/63 (11/01/22 1636)  SpO2: 97 % (11/01/22 1636) Vital Signs (24h Range):  Temp:  [97 °F (36.1 °C)-98.9 °F (37.2 °C)] 98.8 °F (37.1 °C)  Pulse:  [62-93] 77  Resp:  [13-22] 18  SpO2:  [89 %-100 %] 97 %  BP: (133-185)/(59-81) 138/63     Weight: 63 kg (139 lb)  Body mass index is 26.26 kg/m².    Intake/Output Summary (Last 24 hours) at 11/1/2022 1852  Last data filed at 11/1/2022 1849  Gross per 24 hour   Intake 8354.17 ml   Output 4221 ml   Net 4133.17 ml      Physical Exam  Vitals and nursing note reviewed.   Constitutional:       General: She is not in acute distress.     Appearance: She is well-developed. She is ill-appearing. She is not toxic-appearing.      Comments: Soft-spoken    HENT:      Head: Normocephalic and atraumatic.      Nose:      Comments: NG tube     Mouth/Throat:      Mouth: Mucous  membranes are dry.   Eyes:      Conjunctiva/sclera: Conjunctivae normal.   Cardiovascular:      Rate and Rhythm: Normal rate and regular rhythm.      Heart sounds: Normal heart sounds.   Pulmonary:      Effort: Pulmonary effort is normal. No respiratory distress.      Breath sounds: No wheezing or rales.   Abdominal:      General: Bowel sounds are normal. There is no distension.      Palpations: Abdomen is soft.      Tenderness: There is no abdominal tenderness.   Musculoskeletal:         General: No tenderness. Normal range of motion.      Cervical back: Neck supple.      Right lower leg: No edema.      Left lower leg: No edema.   Lymphadenopathy:      Cervical: No cervical adenopathy.   Skin:     General: Skin is warm and dry.      Findings: No rash.   Neurological:      General: No focal deficit present.      Mental Status: She is alert and oriented to person, place, and time.   Psychiatric:         Mood and Affect: Mood normal.       Significant Labs: CBC:   Recent Labs   Lab 10/31/22  0224 11/01/22  0412   WBC 31.24* 27.35*   HGB 9.6* 8.0*   HCT 28.8* 25.2*    244     CMP:   Recent Labs   Lab 10/30/22  2159 10/31/22  0224 11/01/22  0412   * 148*  149* 150*   K 4.4 3.5  3.5 3.4*    108  108 110   CO2 21* 27  27 27   * 116*  116* 160*   * 132*  128* 143*   CREATININE 4.3* 4.2*  4.2* 4.7*   CALCIUM 8.4* 8.4*  8.4* 8.3*   PROT  --  5.4* 4.6*   ALBUMIN 3.0* 3.2*  3.1* 2.5*   BILITOT  --  0.9 0.4   ALKPHOS  --  65 65   AST  --  22 18   ALT  --  20 19   ANIONGAP 18* 13  14 13       Significant Imaging: I have reviewed all pertinent imaging results/findings within the past 24 hours.

## 2022-11-02 ENCOUNTER — TELEPHONE (OUTPATIENT)
Dept: NEPHROLOGY | Facility: CLINIC | Age: 77
End: 2022-11-02
Payer: MEDICARE

## 2022-11-02 PROBLEM — R13.10 DYSPHAGIA: Status: ACTIVE | Noted: 2022-11-02

## 2022-11-02 LAB
ALBUMIN SERPL BCP-MCNC: 3.1 G/DL (ref 3.5–5.2)
ALP SERPL-CCNC: 70 U/L (ref 55–135)
ALT SERPL W/O P-5'-P-CCNC: 20 U/L (ref 10–44)
ANION GAP SERPL CALC-SCNC: 12 MMOL/L (ref 8–16)
ANISOCYTOSIS BLD QL SMEAR: SLIGHT
AST SERPL-CCNC: 30 U/L (ref 10–40)
BASO STIPL BLD QL SMEAR: ABNORMAL
BASOPHILS # BLD AUTO: 0.02 K/UL (ref 0–0.2)
BASOPHILS NFR BLD: 0.1 % (ref 0–1.9)
BILIRUB SERPL-MCNC: 0.6 MG/DL (ref 0.1–1)
BLD PROD TYP BPU: NORMAL
BLOOD UNIT EXPIRATION DATE: NORMAL
BLOOD UNIT TYPE CODE: 7300
BLOOD UNIT TYPE: NORMAL
BUN SERPL-MCNC: 140 MG/DL (ref 8–23)
CALCIUM SERPL-MCNC: 8.5 MG/DL (ref 8.7–10.5)
CHLORIDE SERPL-SCNC: 105 MMOL/L (ref 95–110)
CO2 SERPL-SCNC: 28 MMOL/L (ref 23–29)
CODING SYSTEM: NORMAL
CREAT SERPL-MCNC: 4.6 MG/DL (ref 0.5–1.4)
CRYOGLOB SER QL: ABNORMAL
DIFFERENTIAL METHOD: ABNORMAL
DISPENSE STATUS: NORMAL
EOSINOPHIL # BLD AUTO: 0.4 K/UL (ref 0–0.5)
EOSINOPHIL NFR BLD: 1.8 % (ref 0–8)
ERYTHROCYTE [DISTWIDTH] IN BLOOD BY AUTOMATED COUNT: 14.9 % (ref 11.5–14.5)
EST. GFR  (NO RACE VARIABLE): 9.4 ML/MIN/1.73 M^2
FINAL PATHOLOGIC DIAGNOSIS: NORMAL
GIANT PLATELETS BLD QL SMEAR: PRESENT
GLUCOSE SERPL-MCNC: 127 MG/DL (ref 70–110)
GROSS: NORMAL
HCT VFR BLD AUTO: 24.2 % (ref 37–48.5)
HGB BLD-MCNC: 8.1 G/DL (ref 12–16)
HYPOCHROMIA BLD QL SMEAR: ABNORMAL
IMM GRANULOCYTES # BLD AUTO: 0.25 K/UL (ref 0–0.04)
IMM GRANULOCYTES NFR BLD AUTO: 1.1 % (ref 0–0.5)
LYMPHOCYTES # BLD AUTO: 0.6 K/UL (ref 1–4.8)
LYMPHOCYTES NFR BLD: 2.5 % (ref 18–48)
Lab: NORMAL
MAGNESIUM SERPL-MCNC: 1.9 MG/DL (ref 1.6–2.6)
MCH RBC QN AUTO: 29 PG (ref 27–31)
MCHC RBC AUTO-ENTMCNC: 33.5 G/DL (ref 32–36)
MCV RBC AUTO: 87 FL (ref 82–98)
MONOCYTES # BLD AUTO: 0.5 K/UL (ref 0.3–1)
MONOCYTES NFR BLD: 2.3 % (ref 4–15)
NEUTROPHILS # BLD AUTO: 20.2 K/UL (ref 1.8–7.7)
NEUTROPHILS NFR BLD: 92.2 % (ref 38–73)
NRBC BLD-RTO: 0 /100 WBC
NUM UNITS TRANS FFP: NORMAL
OVALOCYTES BLD QL SMEAR: ABNORMAL
PHOSPHATE SERPL-MCNC: 5.3 MG/DL (ref 2.7–4.5)
PLATELET # BLD AUTO: 193 K/UL (ref 150–450)
PLATELET BLD QL SMEAR: ABNORMAL
PMV BLD AUTO: 12.1 FL (ref 9.2–12.9)
POCT GLUCOSE: 135 MG/DL (ref 70–110)
POCT GLUCOSE: 152 MG/DL (ref 70–110)
POCT GLUCOSE: 167 MG/DL (ref 70–110)
POCT GLUCOSE: 178 MG/DL (ref 70–110)
POCT GLUCOSE: 221 MG/DL (ref 70–110)
POIKILOCYTOSIS BLD QL SMEAR: SLIGHT
POLYCHROMASIA BLD QL SMEAR: ABNORMAL
POTASSIUM SERPL-SCNC: 3.7 MMOL/L (ref 3.5–5.1)
PROT SERPL-MCNC: 5.3 G/DL (ref 6–8.4)
RBC # BLD AUTO: 2.79 M/UL (ref 4–5.4)
SCHISTOCYTES BLD QL SMEAR: ABNORMAL
SODIUM SERPL-SCNC: 145 MMOL/L (ref 136–145)
SPHEROCYTES BLD QL SMEAR: ABNORMAL
TARGETS BLD QL SMEAR: ABNORMAL
TOXIC GRANULES BLD QL SMEAR: PRESENT
WBC # BLD AUTO: 21.91 K/UL (ref 3.9–12.7)

## 2022-11-02 PROCEDURE — 99233 SBSQ HOSP IP/OBS HIGH 50: CPT | Mod: ,,, | Performed by: STUDENT IN AN ORGANIZED HEALTH CARE EDUCATION/TRAINING PROGRAM

## 2022-11-02 PROCEDURE — P9017 PLASMA 1 DONOR FRZ W/IN 8 HR: HCPCS | Performed by: PATHOLOGY

## 2022-11-02 PROCEDURE — 63600175 PHARM REV CODE 636 W HCPCS: Performed by: STUDENT IN AN ORGANIZED HEALTH CARE EDUCATION/TRAINING PROGRAM

## 2022-11-02 PROCEDURE — 99233 PR SUBSEQUENT HOSPITAL CARE,LEVL III: ICD-10-PCS | Mod: ,,, | Performed by: INTERNAL MEDICINE

## 2022-11-02 PROCEDURE — 25000003 PHARM REV CODE 250: Performed by: HOSPITALIST

## 2022-11-02 PROCEDURE — 94660 CPAP INITIATION&MGMT: CPT

## 2022-11-02 PROCEDURE — 63600175 PHARM REV CODE 636 W HCPCS: Performed by: PATHOLOGY

## 2022-11-02 PROCEDURE — 94761 N-INVAS EAR/PLS OXIMETRY MLT: CPT

## 2022-11-02 PROCEDURE — 97530 THERAPEUTIC ACTIVITIES: CPT

## 2022-11-02 PROCEDURE — 94640 AIRWAY INHALATION TREATMENT: CPT

## 2022-11-02 PROCEDURE — 25000242 PHARM REV CODE 250 ALT 637 W/ HCPCS

## 2022-11-02 PROCEDURE — 99231 PR SUBSEQUENT HOSPITAL CARE,LEVL I: ICD-10-PCS | Mod: GC,,, | Performed by: INTERNAL MEDICINE

## 2022-11-02 PROCEDURE — 97535 SELF CARE MNGMENT TRAINING: CPT

## 2022-11-02 PROCEDURE — 36514 APHERESIS PLASMA: CPT | Mod: ,,, | Performed by: PATHOLOGY

## 2022-11-02 PROCEDURE — 25000003 PHARM REV CODE 250: Performed by: STUDENT IN AN ORGANIZED HEALTH CARE EDUCATION/TRAINING PROGRAM

## 2022-11-02 PROCEDURE — 20600001 HC STEP DOWN PRIVATE ROOM

## 2022-11-02 PROCEDURE — 36514 APHERESIS PLASMA: CPT

## 2022-11-02 PROCEDURE — 99233 SBSQ HOSP IP/OBS HIGH 50: CPT | Mod: ,,, | Performed by: INTERNAL MEDICINE

## 2022-11-02 PROCEDURE — 99900035 HC TECH TIME PER 15 MIN (STAT)

## 2022-11-02 PROCEDURE — 25000003 PHARM REV CODE 250

## 2022-11-02 PROCEDURE — 80053 COMPREHEN METABOLIC PANEL: CPT

## 2022-11-02 PROCEDURE — 83735 ASSAY OF MAGNESIUM: CPT

## 2022-11-02 PROCEDURE — 36415 COLL VENOUS BLD VENIPUNCTURE: CPT

## 2022-11-02 PROCEDURE — 84100 ASSAY OF PHOSPHORUS: CPT

## 2022-11-02 PROCEDURE — 25000003 PHARM REV CODE 250: Performed by: PATHOLOGY

## 2022-11-02 PROCEDURE — 92526 ORAL FUNCTION THERAPY: CPT

## 2022-11-02 PROCEDURE — 27000221 HC OXYGEN, UP TO 24 HOURS

## 2022-11-02 PROCEDURE — P9045 ALBUMIN (HUMAN), 5%, 250 ML: HCPCS | Mod: JG | Performed by: PATHOLOGY

## 2022-11-02 PROCEDURE — 99233 PR SUBSEQUENT HOSPITAL CARE,LEVL III: ICD-10-PCS | Mod: ,,, | Performed by: STUDENT IN AN ORGANIZED HEALTH CARE EDUCATION/TRAINING PROGRAM

## 2022-11-02 PROCEDURE — 36514 PR THER APHERESIS,PLASMA PHERESIS: ICD-10-PCS | Mod: ,,, | Performed by: PATHOLOGY

## 2022-11-02 PROCEDURE — 99231 SBSQ HOSP IP/OBS SF/LOW 25: CPT | Mod: GC,,, | Performed by: INTERNAL MEDICINE

## 2022-11-02 PROCEDURE — 85025 COMPLETE CBC W/AUTO DIFF WBC: CPT

## 2022-11-02 RX ORDER — HYDRALAZINE HYDROCHLORIDE 25 MG/1
25 TABLET, FILM COATED ORAL EVERY 8 HOURS
Status: DISCONTINUED | OUTPATIENT
Start: 2022-11-02 | End: 2022-11-07

## 2022-11-02 RX ORDER — HYDRALAZINE HYDROCHLORIDE 50 MG/1
50 TABLET, FILM COATED ORAL EVERY 8 HOURS
Status: DISCONTINUED | OUTPATIENT
Start: 2022-11-02 | End: 2022-11-02

## 2022-11-02 RX ORDER — ALBUMIN HUMAN 50 G/1000ML
50 SOLUTION INTRAVENOUS ONCE
Status: COMPLETED | OUTPATIENT
Start: 2022-11-03 | End: 2022-11-03

## 2022-11-02 RX ORDER — HEPARIN SODIUM 1000 [USP'U]/ML
3200 INJECTION, SOLUTION INTRAVENOUS; SUBCUTANEOUS ONCE
Status: COMPLETED | OUTPATIENT
Start: 2022-11-03 | End: 2022-11-03

## 2022-11-02 RX ORDER — DIPHENHYDRAMINE HYDROCHLORIDE 50 MG/ML
50 INJECTION INTRAMUSCULAR; INTRAVENOUS ONCE
Status: COMPLETED | OUTPATIENT
Start: 2022-11-03 | End: 2022-11-03

## 2022-11-02 RX ORDER — CALCIUM GLUCONATE 20 MG/ML
1 INJECTION, SOLUTION INTRAVENOUS ONCE
Status: COMPLETED | OUTPATIENT
Start: 2022-11-02 | End: 2022-11-02

## 2022-11-02 RX ORDER — HYDRALAZINE HYDROCHLORIDE 50 MG/1
50 TABLET, FILM COATED ORAL EVERY 8 HOURS PRN
Status: DISCONTINUED | OUTPATIENT
Start: 2022-11-02 | End: 2022-11-05

## 2022-11-02 RX ORDER — CARVEDILOL 12.5 MG/1
12.5 TABLET ORAL 2 TIMES DAILY
Status: DISCONTINUED | OUTPATIENT
Start: 2022-11-02 | End: 2022-11-07

## 2022-11-02 RX ADMIN — PANTOPRAZOLE SODIUM 40 MG: 40 GRANULE, DELAYED RELEASE ORAL at 09:11

## 2022-11-02 RX ADMIN — HYDRALAZINE HYDROCHLORIDE 25 MG: 25 TABLET, FILM COATED ORAL at 04:11

## 2022-11-02 RX ADMIN — LISINOPRIL 40 MG: 20 TABLET ORAL at 09:11

## 2022-11-02 RX ADMIN — CALCIUM GLUCONATE 1 G: 20 INJECTION, SOLUTION INTRAVENOUS at 01:11

## 2022-11-02 RX ADMIN — QUETIAPINE FUMARATE 25 MG: 25 TABLET ORAL at 08:11

## 2022-11-02 RX ADMIN — INSULIN ASPART 4 UNITS: 100 INJECTION, SOLUTION INTRAVENOUS; SUBCUTANEOUS at 12:11

## 2022-11-02 RX ADMIN — METHYLPREDNISOLONE SODIUM SUCCINATE 24 MG: 40 INJECTION, POWDER, FOR SOLUTION INTRAMUSCULAR; INTRAVENOUS at 09:11

## 2022-11-02 RX ADMIN — HYDRALAZINE HYDROCHLORIDE 10 MG: 20 INJECTION, SOLUTION INTRAMUSCULAR; INTRAVENOUS at 09:11

## 2022-11-02 RX ADMIN — IPRATROPIUM BROMIDE AND ALBUTEROL SULFATE 3 ML: 2.5; .5 SOLUTION RESPIRATORY (INHALATION) at 07:11

## 2022-11-02 RX ADMIN — LEVOTHYROXINE SODIUM 25 MCG: 25 TABLET ORAL at 05:11

## 2022-11-02 RX ADMIN — ATOVAQUONE 1500 MG: 750 SUSPENSION ORAL at 09:11

## 2022-11-02 RX ADMIN — CARVEDILOL 12.5 MG: 12.5 TABLET, FILM COATED ORAL at 08:11

## 2022-11-02 RX ADMIN — CARVEDILOL 6.25 MG: 6.25 TABLET, FILM COATED ORAL at 09:11

## 2022-11-02 RX ADMIN — DIPHENHYDRAMINE HYDROCHLORIDE 50 MG: 50 INJECTION, SOLUTION INTRAMUSCULAR; INTRAVENOUS at 01:11

## 2022-11-02 RX ADMIN — CLONIDINE HYDROCHLORIDE 0.2 MG: 0.1 TABLET ORAL at 12:11

## 2022-11-02 RX ADMIN — ALBUMIN (HUMAN) 50 G: 12.5 SOLUTION INTRAVENOUS at 01:11

## 2022-11-02 RX ADMIN — AMLODIPINE BESYLATE 10 MG: 10 TABLET ORAL at 09:11

## 2022-11-02 RX ADMIN — HEPARIN SODIUM 3200 UNITS: 1000 INJECTION, SOLUTION INTRAVENOUS; SUBCUTANEOUS at 01:11

## 2022-11-02 RX ADMIN — HYDRALAZINE HYDROCHLORIDE 25 MG: 25 TABLET, FILM COATED ORAL at 10:11

## 2022-11-02 RX ADMIN — CALCIUM GLUCONATE 2 G: 20 INJECTION, SOLUTION INTRAVENOUS at 01:11

## 2022-11-02 RX ADMIN — INSULIN ASPART 2 UNITS: 100 INJECTION, SOLUTION INTRAVENOUS; SUBCUTANEOUS at 05:11

## 2022-11-02 NOTE — SUBJECTIVE & OBJECTIVE
Interval History: MAEVE. Underwent Plex #5 yesterday without difficulty. Reports feeling more fatigued today, very thirsty. 350cc UOP over the past 24 hours.     Review of patient's allergies indicates:   Allergen Reactions    Ampicillin     Peaches [peach (prunus persica)] Other (See Comments)     Pt unable to state type of reaction. Information obtained from daughter who states she was informed of allergy from patient.    Penicillins      Other reaction(s): Hives    Sulfa (sulfonamide antibiotics) Rash and Hives     Current Facility-Administered Medications   Medication Frequency    0.9%  NaCl infusion (for blood administration) Q24H PRN    0.9%  NaCl infusion (for blood administration) Q24H PRN    acetaminophen tablet 650 mg Q4H PRN    albumin human 5% bottle 50 g Once    albuterol-ipratropium 2.5 mg-0.5 mg/3 mL nebulizer solution 3 mL Q4H PRN    aluminum-magnesium hydroxide-simethicone 200-200-20 mg/5 mL suspension 30 mL QID PRN    amLODIPine tablet 10 mg Daily    atovaquone 750 mg/5 mL oral liquid 1,500 mg Daily    bisacodyL suppository 10 mg Daily PRN    calcium gluconate 1 g in NS IVPB (premixed) Once    carvediloL tablet 6.25 mg BID    cloNIDine tablet 0.2 mg Q4H PRN    dextrose 10% bolus 125 mL PRN    dextrose 10% bolus 250 mL PRN    diphenhydrAMINE injection 50 mg Once    glucagon (human recombinant) injection 1 mg PRN    glucose chewable tablet 16 g PRN    glucose chewable tablet 24 g PRN    heparin (porcine) injection 3,200 Units Once    hydrALAZINE injection 10 mg Q8H PRN    insulin aspart U-100 pen 1-10 Units Q6H PRN    levothyroxine tablet 25 mcg Before breakfast    lisinopriL tablet 40 mg Daily    melatonin tablet 6 mg Nightly PRN    methocarbamoL tablet 500 mg TID PRN    methylPREDNISolone sodium succinate injection 24 mg Daily    naloxone 0.4 mg/mL injection 0.02 mg PRN    ondansetron disintegrating tablet 8 mg Q8H PRN    pantoprazole suspension 40 mg Daily    prochlorperazine injection Soln 5 mg  Q6H PRN    QUEtiapine tablet 25 mg QHS    simethicone chewable tablet 80 mg QID PRN    sodium chloride 0.9% 250 mL flush bag 1 time in Clinic/HOD    sodium chloride 0.9% flush 10 mL PRN    sodium chloride 0.9% flush 5 mL PRN     Facility-Administered Medications Ordered in Other Encounters   Medication Frequency    celecoxib capsule 400 mg Once    fentaNYL 50 mcg/mL injection  mcg PRN    LIDOcaine (PF) 10 mg/ml (1%) injection 10 mg Once PRN    LIDOcaine (PF) 10 mg/ml (1%) injection 10 mg Once    midazolam (VERSED) 1 mg/mL injection 0.5-4 mg PRN    ropivacaine 0.2% [facility] PainPRO Pump infusion 500 ML Continuous       Objective:     Vital Signs (Most Recent):  Temp: 98.7 °F (37.1 °C) (11/02/22 0726)  Pulse: 79 (11/02/22 0746)  Resp: 20 (11/02/22 0746)  BP: (!) 177/77 (11/02/22 0726)  SpO2: 98 % (11/02/22 0746)  O2 Device (Oxygen Therapy): nasal cannula (11/02/22 0746)   Vital Signs (24h Range):  Temp:  [97 °F (36.1 °C)-98.8 °F (37.1 °C)] 98.7 °F (37.1 °C)  Pulse:  [71-93] 79  Resp:  [16-28] 20  SpO2:  [96 %-100 %] 98 %  BP: (133-177)/(59-79) 177/77     Weight: 63 kg (139 lb) (11/01/22 1245)  Body mass index is 26.26 kg/m².  Body surface area is 1.65 meters squared.    I/O last 3 completed shifts:  In: 8354.2 [I.V.:1051.8; Blood:5509; NG/GT:1695; IV Piggyback:98.3]  Out: 4221 [Urine:900; Stool:325; Blood:2996]    Physical Exam  Vitals and nursing note reviewed.   Constitutional:       General: She is not in acute distress.     Appearance: She is well-developed. She is ill-appearing. She is not toxic-appearing.      Comments: Patient awake and alert   HENT:      Head: Normocephalic and atraumatic.      Mouth/Throat:      Mouth: Mucous membranes are dry.   Eyes:      Conjunctiva/sclera: Conjunctivae normal.   Cardiovascular:      Rate and Rhythm: Normal rate and regular rhythm.      Heart sounds: Normal heart sounds.   Pulmonary:      Effort: Pulmonary effort is normal. No respiratory distress.      Breath sounds:  No wheezing or rales.      Comments: Coarse breath sounds bilaterally  Abdominal:      General: Bowel sounds are normal. There is no distension.      Palpations: Abdomen is soft.      Tenderness: There is no abdominal tenderness.   Musculoskeletal:         General: No tenderness. Normal range of motion.      Cervical back: Normal range of motion and neck supple.      Right lower leg: No edema.      Left lower leg: No edema.   Lymphadenopathy:      Cervical: No cervical adenopathy.   Skin:     General: Skin is warm and dry.      Capillary Refill: Capillary refill takes less than 2 seconds.      Findings: No rash.   Neurological:      General: No focal deficit present.      Mental Status: She is alert and oriented to person, place, and time.       Significant Labs:  CBC:   Recent Labs   Lab 11/02/22  0554   WBC 21.91*   RBC 2.79*   HGB 8.1*   HCT 24.2*      MCV 87   MCH 29.0   MCHC 33.5       CMP:   Recent Labs   Lab 11/02/22  0554   *   CALCIUM 8.5*   ALBUMIN 3.1*   PROT 5.3*      K 3.7   CO2 28      *   CREATININE 4.6*   ALKPHOS 70   ALT 20   AST 30   BILITOT 0.6       All labs within the past 24 hours have been reviewed.     Significant Imaging:

## 2022-11-02 NOTE — ASSESSMENT & PLAN NOTE
As of 10/26/22 strongly positive MPO Ab (in contrast to borderline positive PR3), consistent with clinical picture of microscopic polyangiitis with pulmonary, and renal involvement. Trialysis catheter placed overnight with plans for plasmapheresis early this AM. Pt tolerated procedure well despite multiple attempts to place line. Went for IR Renal biopsy this AM. Tolerated procedure well. Endorsing burning at szymanski insertion site. Continuing Azithromycin and Cefepime. Rheumatology planning to start rituximab and cyclophosphamide.      - Nephrology consulted  - Transfusion medicine consulted  - Rheumatology consulted  - Continue Plasmapheresis w/ plans for 7 treatments over 14-day period  - Rituximab and cyclophosphamide initiated 10/28.   -- Next Rituximab 275 mg/m^2 due on November 3  -- Next cyclophosphamide 7.5 mg/kg due on November 10          - s/p 7 days of high dose steroids, tapering per rheumatology's recs  -- IV solumedrol 24mg daily x7 days  - Protonix daily  - PJP prophylaxis with atovaquone via NG

## 2022-11-02 NOTE — PT/OT/SLP PROGRESS
Physical Therapy      Patient Name:  Kristin Goodman   MRN:  [id number]    PT to bedside for PT treatment session. Pt receiving plasmapheresis at this time. PT to return when appropriate.

## 2022-11-02 NOTE — PROGRESS NOTES
2.5 liter plasma exchange complete by [facility] staff nurse CRISTINE Manning per blood bank protocol.  1 liter of 5% albumin and 1.5 liters of FFP used as replacement fluid.  See flowsheet on chart details.  Tolerated well.  Next tx 11/01/2022.

## 2022-11-02 NOTE — ASSESSMENT & PLAN NOTE
Patient with baseline creatinine of 1 as recent as August 2022 with progressive worsening beginning in early October 1.3 (10/13)->1.6 (10/17)->3.0 (10/23) despite IV fluids.  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  Patient noted to have new onset proteinuria and hematuria over the last 1 month.  Additionally, would consider ATN in the setting of suspected sepsis from multifocal pneumonia.     Concerns for glomerulonephritis given patient's current clinical picture. Initial urine microscopy demonstrating muddy brown casts with likely acanthocytes noted as well. MITUL positive, proteinase 3 ab weakly positive, MPO strongly positive. Patient s/p high-dose IV solumedrol x3 doses, now on Solumedrol 50mg qd. Underwent kidney bx 10/27. Rheumatology consulted, and patient started on Plex, Ritux/cytoxan. Patient has received 2 rounds of Plex, as well as 1 dose of Ritux and cytoxan on 10/27. Given continually worsening renal function and uremic encephalopathy, patient underwent SLED without complication on 10/27. Transferred to floor on 10/29. Unable to tolerate HD on 10/29 due to tachycardia and anxiety. However, no urgent need for dialysis since that time. Significantly improved mentation on 10/31. Underwent HD 11/1. Patient also with increasing hypernatremia so added FWF to tube feeds.     Recommendations:  - no HD today  - Plex #6 today  - f/u kidney bx results   - continue plex, Ritux/cytoxan per rheum (next dose of ritux and cytoxan on 11/3)  - continue steroid taper  - strict intake and output  - renally dose medications and avoid nephrotoxins when possible  - replete electrolytes as appropriate

## 2022-11-02 NOTE — PROGRESS NOTES
[facility] ([place] [address])  Nephrology  Progress Note    Patient Name: Kristin Goodman  MRN: [id number]  Admission Date: 10/17/2022  Hospital Length of Stay: 16 days  Attending Provider: Ethan De Jesus MD   Primary Care Physician: Lori Hernandez MD  Principal Problem:Microscopic polyangiitis    Subjective:     HPI: Kristin Goodman is a 77 year old female with hypertension, hypothyroidism, HFpEF who presents for cough, shortness of breath, and weakness.  Patient initially presented to ER on 09/24 with hemoptysis and imaging concerning for multilobar pneumonia at which time she was discharged on a 10 day course of levofloxacin.  Patient initially had some improvement but began having worsening symptoms on 10/14.  Patient describes multiple fevers and progressive shortness of breath.  She continues to have intermittent hemoptysis.  Patient with worsening leukocytosis and limited clinical improvement despite broad-spectrum antibiotics.  She remains on cefepime.  Patient is a noted to have baseline creatinine of 1 as recent as 8/2022 but progressive worsening of creatinine in early October.  On admission patient with creatinine of 1.6 (10/17) with subsequent progression and creatinine of 3.0 at time of consultation (10/23).  Nephrology consulted for acute kidney injury.      Interval History: NAEON. Underwent Plex #5 yesterday without difficulty. Reports feeling more fatigued today, very thirsty. 350cc UOP over the past 24 hours.     Review of patient's allergies indicates:   Allergen Reactions    Ampicillin     Peaches [peach (prunus persica)] Other (See Comments)     Pt unable to state type of reaction. Information obtained from daughter who states she was informed of allergy from patient.    Penicillins      Other reaction(s): Hives    Sulfa (sulfonamide antibiotics) Rash and Hives     Current Facility-Administered Medications   Medication Frequency    0.9%  NaCl infusion (for blood  administration) Q24H PRN    0.9%  NaCl infusion (for blood administration) Q24H PRN    acetaminophen tablet 650 mg Q4H PRN    albumin human 5% bottle 50 g Once    albuterol-ipratropium 2.5 mg-0.5 mg/3 mL nebulizer solution 3 mL Q4H PRN    aluminum-magnesium hydroxide-simethicone 200-200-20 mg/5 mL suspension 30 mL QID PRN    amLODIPine tablet 10 mg Daily    atovaquone 750 mg/5 mL oral liquid 1,500 mg Daily    bisacodyL suppository 10 mg Daily PRN    calcium gluconate 1 g in NS IVPB (premixed) Once    carvediloL tablet 6.25 mg BID    cloNIDine tablet 0.2 mg Q4H PRN    dextrose 10% bolus 125 mL PRN    dextrose 10% bolus 250 mL PRN    diphenhydrAMINE injection 50 mg Once    glucagon (human recombinant) injection 1 mg PRN    glucose chewable tablet 16 g PRN    glucose chewable tablet 24 g PRN    heparin (porcine) injection 3,200 Units Once    hydrALAZINE injection 10 mg Q8H PRN    insulin aspart U-100 pen 1-10 Units Q6H PRN    levothyroxine tablet 25 mcg Before breakfast    lisinopriL tablet 40 mg Daily    melatonin tablet 6 mg Nightly PRN    methocarbamoL tablet 500 mg TID PRN    methylPREDNISolone sodium succinate injection 24 mg Daily    naloxone 0.4 mg/mL injection 0.02 mg PRN    ondansetron disintegrating tablet 8 mg Q8H PRN    pantoprazole suspension 40 mg Daily    prochlorperazine injection Soln 5 mg Q6H PRN    QUEtiapine tablet 25 mg QHS    simethicone chewable tablet 80 mg QID PRN    sodium chloride 0.9% 250 mL flush bag 1 time in Clinic/HOD    sodium chloride 0.9% flush 10 mL PRN    sodium chloride 0.9% flush 5 mL PRN     Facility-Administered Medications Ordered in Other Encounters   Medication Frequency    celecoxib capsule 400 mg Once    fentaNYL 50 mcg/mL injection  mcg PRN    LIDOcaine (PF) 10 mg/ml (1%) injection 10 mg Once PRN    LIDOcaine (PF) 10 mg/ml (1%) injection 10 mg Once    midazolam (VERSED) 1 mg/mL injection 0.5-4 mg PRN    ropivacaine 0.2% [facility]  PainPRO Pump infusion 500 ML Continuous       Objective:     Vital Signs (Most Recent):  Temp: 98.7 °F (37.1 °C) (11/02/22 0726)  Pulse: 79 (11/02/22 0746)  Resp: 20 (11/02/22 0746)  BP: (!) 177/77 (11/02/22 0726)  SpO2: 98 % (11/02/22 0746)  O2 Device (Oxygen Therapy): nasal cannula (11/02/22 0746)   Vital Signs (24h Range):  Temp:  [97 °F (36.1 °C)-98.8 °F (37.1 °C)] 98.7 °F (37.1 °C)  Pulse:  [71-93] 79  Resp:  [16-28] 20  SpO2:  [96 %-100 %] 98 %  BP: (133-177)/(59-79) 177/77     Weight: 63 kg (139 lb) (11/01/22 1245)  Body mass index is 26.26 kg/m².  Body surface area is 1.65 meters squared.    I/O last 3 completed shifts:  In: 8354.2 [I.V.:1051.8; Blood:5509; NG/GT:1695; IV Piggyback:98.3]  Out: 4221 [Urine:900; Stool:325; Blood:2996]    Physical Exam  Vitals and nursing note reviewed.   Constitutional:       General: She is not in acute distress.     Appearance: She is well-developed. She is ill-appearing. She is not toxic-appearing.      Comments: Patient awake and alert   HENT:      Head: Normocephalic and atraumatic.      Mouth/Throat:      Mouth: Mucous membranes are dry.   Eyes:      Conjunctiva/sclera: Conjunctivae normal.   Cardiovascular:      Rate and Rhythm: Normal rate and regular rhythm.      Heart sounds: Normal heart sounds.   Pulmonary:      Effort: Pulmonary effort is normal. No respiratory distress.      Breath sounds: No wheezing or rales.      Comments: Coarse breath sounds bilaterally  Abdominal:      General: Bowel sounds are normal. There is no distension.      Palpations: Abdomen is soft.      Tenderness: There is no abdominal tenderness.   Musculoskeletal:         General: No tenderness. Normal range of motion.      Cervical back: Normal range of motion and neck supple.      Right lower leg: No edema.      Left lower leg: No edema.   Lymphadenopathy:      Cervical: No cervical adenopathy.   Skin:     General: Skin is warm and dry.      Capillary Refill: Capillary refill takes less than  2 seconds.      Findings: No rash.   Neurological:      General: No focal deficit present.      Mental Status: She is alert and oriented to person, place, and time.       Significant Labs:  CBC:   Recent Labs   Lab 11/02/22  0554   WBC 21.91*   RBC 2.79*   HGB 8.1*   HCT 24.2*      MCV 87   MCH 29.0   MCHC 33.5       CMP:   Recent Labs   Lab 11/02/22  0554   *   CALCIUM 8.5*   ALBUMIN 3.1*   PROT 5.3*      K 3.7   CO2 28      *   CREATININE 4.6*   ALKPHOS 70   ALT 20   AST 30   BILITOT 0.6       All labs within the past 24 hours have been reviewed.     Significant Imaging:       Assessment/Plan:     Acute renal failure superimposed on stage 3 chronic kidney disease  Patient with baseline creatinine of 1 as recent as August 2022 with progressive worsening beginning in early October 1.3 (10/13)->1.6 (10/17)->3.0 (10/23) despite IV fluids.  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  Patient noted to have new onset proteinuria and hematuria over the last 1 month.  Additionally, would consider ATN in the setting of suspected sepsis from multifocal pneumonia.     Concerns for glomerulonephritis given patient's current clinical picture. Initial urine microscopy demonstrating muddy brown casts with likely acanthocytes noted as well. MITUL positive, proteinase 3 ab weakly positive, MPO strongly positive. Patient s/p high-dose IV solumedrol x3 doses, now on Solumedrol 50mg qd. Underwent kidney bx 10/27. Rheumatology consulted, and patient started on Plex, Ritux/cytoxan. Patient has received 2 rounds of Plex, as well as 1 dose of Ritux and cytoxan on 10/27. Given continually worsening renal function and uremic encephalopathy, patient underwent SLED without complication on 10/27. Transferred to floor on 10/29. Unable to tolerate HD on 10/29 due to tachycardia and anxiety. However, no urgent need for dialysis since that time.  Significantly improved mentation on 10/31. Underwent HD 11/1. Patient also with increasing hypernatremia so added FWF to tube feeds.     Recommendations:  - no HD today  - Plex #6 today  - f/u kidney bx results   - continue plex, Ritux/cytoxan per rheum (next dose of ritux and cytoxan on 11/3)  - continue steroid taper  - strict intake and output  - renally dose medications and avoid nephrotoxins when possible  - replete electrolytes as appropriate        Thank you for your consult. I will follow-up with patient. Please contact us if you have any additional questions.    Bipin Frias MD  Nephrology  [facility] ([place]

## 2022-11-02 NOTE — PROGRESS NOTES
"[facility] ([place] [address])  Rheumatology  Progress Note    Patient Name: Kristin Goodman  MRN: [id number]  Admission Date: 10/17/2022  Hospital Length of Stay: 16 days  Code Status: Full Code   Attending Provider: Ethan De Jesus MD  Primary Care Physician: Lori Hernandez MD  Principal Problem: Microscopic polyangiitis    Subjective:     HPI: Patient is a 76 yo F with chronic diastolic heart failure, HTN, OA, who is admitted for respiratory failure. Rheumatology is consulted due to concern for vasculitis. Patient initially presented to the ED on 9/24/22 complaining of a week of cough followed by an episode of hemoptysis on 9/24 prompting the ED visit. They did a CTA which showed multifocal PNA. She was sent home with Levuin. She followed up with PCP on 10/4/22 and was feeling better. Then she presented to the ED again on 10/14 and on 10/17 complaining of dyspnea. She had fevers in the few days leading up to admission of 102. She endorsed weakness, productive cough, dyspnea, and loss of appetite. Pt initally at 88% O2 saturation and improved to 98% on 2L NC. She was admitted for IV antibiotics. CT chest with contrast on 10/17 showed evidence of interval progression of bilateral perihilar dense consolidation with peripheral patchy areas of ground-glass opacification nonspecific as to the etiology.  Bilateral pneumonia as well as inflammatory etiologies considered.  Cardiogenic pulmonary edema considered less likely given the pattern.    On 10/19/22 she started having melena. Hb dropped to 6. She got 2 units pRBCs. GI was consulted. They noted "Colonoscopy in 2020 showed internal hemorrhoids and mild sigmoid diverticulosis. EGD in 2012 showed gastritis, biopsies positive for H. Pylori." "We considered doing EGD now, but from a pulmonary standpoint I do not think she is stable enough with the current pneumonia, therefore would recommend that we just follow the patient, treat with a PPI in case there " "is any peptic ulcer disease."    On 10/21/22 ENT was consulted due to left sided otalgia the day prior which resolved. She also was complaining of cervical adenopathy and sore throat. They did not recommend surgical intervention and felt that adenopathy was likely reactive.     On 10/23/22 ID was consulted. They recommended checking respiratory infection panel and fungal markers.    On 10/23/22 Nephrology was consulted due to worsening creatinine. They noted, "Patient is a noted to have baseline creatinine of 1 as recent as 8/2022 but progressive worsening of creatinine in early October.  On admission patient with creatinine of 1.6 (10/17) with subsequent progression and creatinine of 3.0 at time of consultation (10/23).  Nephrology consulted for acute kidney injury." "Patient with ATN nephritic picture in urine sediment, prot/crea ratio pending. Had hematuria in recent past but in context with positive urine cultures. Now definitely more dysmorphic red cells that wbcs in the urine. 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    On 10/23/22, she was transferred to ICU due to desaturations and respiratory distress. Pulmonologist noted that her respiratory status is too tenuous for bronchoscopy. She was stabilized with non-invasive ventilation and nasal cannula oxygen.      Interval History: Feeling sleepy. Received PLEX #5 yesterday and HD. Undergoing PLEX # 6 today. Rituximab tomorrow    Current Facility-Administered Medications   Medication Frequency    0.9%  NaCl infusion (for blood administration) Q24H PRN    0.9%  NaCl infusion (for blood administration) Q24H PRN    acetaminophen tablet 650 mg Q4H PRN    albumin human 5% bottle 50 g Once    [START ON 11/3/2022] albumin human 5% bottle 50 g Once    albuterol-ipratropium 2.5 mg-0.5 mg/3 mL nebulizer solution 3 mL Q4H PRN    aluminum-magnesium " hydroxide-simethicone 200-200-20 mg/5 mL suspension 30 mL QID PRN    amLODIPine tablet 10 mg Daily    atovaquone 750 mg/5 mL oral liquid 1,500 mg Daily    bisacodyL suppository 10 mg Daily PRN    calcium gluconate 1 g in NS IVPB (premixed) Once    calcium gluconate 1 g in NS IVPB (premixed) Once    [START ON 11/3/2022] calcium gluconate 1 g in sodium chloride 0.9% 100 mL IVPB Once    carvediloL tablet 12.5 mg BID    cloNIDine tablet 0.2 mg Q4H PRN    dextrose 10% bolus 125 mL PRN    dextrose 10% bolus 250 mL PRN    [START ON 11/3/2022] diphenhydrAMINE injection 50 mg Once    glucagon (human recombinant) injection 1 mg PRN    glucose chewable tablet 16 g PRN    glucose chewable tablet 24 g PRN    [START ON 11/3/2022] heparin (porcine) injection 3,200 Units Once    hydrALAZINE injection 10 mg Q8H PRN    insulin aspart U-100 pen 1-10 Units Q6H PRN    levothyroxine tablet 25 mcg Before breakfast    lisinopriL tablet 40 mg Daily    melatonin tablet 6 mg Nightly PRN    methocarbamoL tablet 500 mg TID PRN    methylPREDNISolone sodium succinate injection 24 mg Daily    naloxone 0.4 mg/mL injection 0.02 mg PRN    ondansetron disintegrating tablet 8 mg Q8H PRN    pantoprazole suspension 40 mg Daily    prochlorperazine injection Soln 5 mg Q6H PRN    QUEtiapine tablet 25 mg QHS    simethicone chewable tablet 80 mg QID PRN    sodium chloride 0.9% 250 mL flush bag 1 time in Clinic/HOD    sodium chloride 0.9% flush 10 mL PRN    sodium chloride 0.9% flush 5 mL PRN     Facility-Administered Medications Ordered in Other Encounters   Medication Frequency    celecoxib capsule 400 mg Once    fentaNYL 50 mcg/mL injection  mcg PRN    LIDOcaine (PF) 10 mg/ml (1%) injection 10 mg Once PRN    LIDOcaine (PF) 10 mg/ml (1%) injection 10 mg Once    midazolam (VERSED) 1 mg/mL injection 0.5-4 mg PRN    ropivacaine 0.2% [facility] PainPRO Pump infusion 500 ML Continuous     Objective:     Vital Signs (Most Recent):  Temp: 97.7 °F (36.5 °C)  (11/02/22 1159)  Pulse: 84 (11/02/22 1336)  Resp: 14 (11/02/22 1159)  BP: (!) 169/74 (11/02/22 1159)  SpO2: 97 % (11/02/22 1336)  O2 Device (Oxygen Therapy): nasal cannula (11/02/22 1159)   Vital Signs (24h Range):  Temp:  [97.7 °F (36.5 °C)-98.8 °F (37.1 °C)] 97.7 °F (36.5 °C)  Pulse:  [71-88] 84  Resp:  [14-28] 14  SpO2:  [96 %-100 %] 97 %  BP: (138-189)/(63-84) 169/74     Weight: 63 kg (139 lb) (11/01/22 1245)  Body mass index is 26.26 kg/m².  Body surface area is 1.65 meters squared.      Intake/Output Summary (Last 24 hours) at 11/2/2022 1433  Last data filed at 11/2/2022 1400  Gross per 24 hour   Intake 2555.17 ml   Output 475 ml   Net 2080.17 ml       Physical Exam   Constitutional: No distress.   HENT:   Head: Normocephalic and atraumatic.   Nose: No rhinorrhea or nasal congestion.   Mouth/Throat: Oropharynx is clear.   No tenderness to palpation of sinuses. NGT in place   Ears: No tenderness to palpation     Eyes: Pupils are equal, round, and reactive to light.   Cardiovascular: Normal rate and regular rhythm.   No murmur heard.  Pulmonary/Chest: Effort normal and breath sounds normal. No respiratory distress. She has no wheezes.   Abdominal: Soft. Bowel sounds are normal. There is no abdominal tenderness.   Musculoskeletal:         General: No deformity. Normal range of motion.      Cervical back: Normal range of motion.   Neurological: She is alert.   Skin: Skin is warm and dry.   Psychiatric: Affect and judgment normal.     Significant Labs:  All pertinent lab results from the last 24 hours have been reviewed.    Significant Imaging:  Imaging results within the past 24 hours have been reviewed.    Assessment/Plan:     Acute hypoxemic respiratory failure  Patient is a 78 yo F with chronic diastolic heart failure, HTN, OA, who is admitted for respiratory failure. Rheumatology is consulted due to concern for vasculitis. Patient presented with cough and hemoptysis since 9/24/22. She was admitted due to  dyspnea and hypoxia on 10/17. She has had Hb drop to 6 this admission requiring blood transfusions. Reviewed her chest imaging which shows progressive disease from 10/14 until now which can be concerning for DAH. This might also explain the Hb drop. No bronch planned for now due to her tenuous respiratory status. GI defers invasive workup for now because she is not stable enough. She has had increasing creatinine from baseline of 1 to 3.0 on 10/23/22. Nephrology concerned for ATN nephritic picture in urine sediment. Pending kidney biopsy result.    UA: 1+ blood, 88 RBCs, 18 WBCs  UPCR 1.54  RF and CCP negative  MITUL+ 1:2560 homogenous, +dsDNA, complements normal  PR3 slightly elevated at 1.2 (reference positive is 1.0)  MPO elevated at 132  C-ANCA+ 1:80  P-ANCA-  GBM antibodies negative  Quantiferon indeterminate  T-Spot Negative    Pending: cryoglobulins    Treating empirically for ANCA vasculitis while awaiting kidney biopsy results:  - s/p IV Solumedrol 1000 mg x3 doses. Now following PEXIVAS steroid taper. Currently on IV Solumedrol 24 mg daily.  - PLEX 10/26, 10/27, 10/29. Next scheduled for 10/30. Total 7 treatments planned.  - Got SLED 10/27. Bedside HD 10/30  - Rituximab 375 mg/m^2 (600 mg) on 10/27 (every 7 day dose 1 of 4)  - Cyclophosphamide 7.5 mg/kg on 10/27 (every 14 day dose 1 of 2)    Recommendations:  Continue steroid taper per PEXIVAS trial as in the chart below. Currently IV Solu-Medrol 24 mg daily (prednisone 30 mg daily equivalent). Started on 10/30  Atovaquone 1500 mg and Protonix daily while on high dose steroids.  Next Rituximab 275 mg/m^2 due on November 3  Next cyclophosphamide 7.5 mg/kg due on November 10  Monitor CBC and CMP in 10-14 days after giving chemotherapy  Follow up kidney biopsy                Thank you for this consult. These are preliminary recommendations. Please follow attending recommendations. Please message with any questions or concerns.       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   ? avacopan  Atovaquone 1500 mg daily  Pantoprazole  *Will need Evusheld given rituximab     Jason Woods MD  Rheumatology  [phone]          bedside

## 2022-11-02 NOTE — PROGRESS NOTES
[facility] ([place] [address])  [facility] Medicine  Progress Note    Patient Name: Kristin Goodman  MRN: [id number]  Patient Class: IP- Inpatient   Admission Date: 10/17/2022  Length of Stay: 15 days  Attending Physician: Ethan De Jesus MD  Primary Care Provider: Lori Hernandez MD        Subjective:     Principal Problem:Microscopic polyangiitis        HPI:  Kristin Goodman is a 77 y.o. female with a past medical history of HTN, hypothyroidism, HFpEF, and osteoarthritis of the arm who has presented to the ED for cough, SOB, and weakness. Daughter is present at the bedside. Patient presented to the ED on 9/24 with hemoptysis, CT and CXR showed high suspicion for multilobar pneumonia. Patient was discharged with a 10-day course of levofloxacin and an albuterol inhaler. Patient followed up with her PCP on 10/4 with slight improvement in symptoms and went back to work. Patient continued to have worsening symptoms and presented to the ED again on 10/14 for evaluation and no interventions were done. Patient endorses fevers over the last few days up to 102.4 F with progressive SOB. She endorses hemoptysis with moderate amount of blood, generalized weakness, productive cough, SOB, and loss of appetite. Denies chest pain, nausea, vomiting, abdominal pain, or urinary changes.    ED: hypertensive up to 219/93 and tachycardic up to 117. Oxygen saturation on 92% on RA, placed on 5L NC with sats >97%. CBC remarkable for leukocytosis of 16.03 and Hb 7.7. K 3.3. Cr 1.6, baseline ~1.0. . Troponin 0.061. COVID and flu negative. EKG NSR. CT chest with contrast pending at time of admission. Given home amlodipine and lisinopril. Given 500mL NS, IV azithromycin, cefepime and vanc.       Overview/Hospital Course:  Initially admitted to hospital medicine, but stepped up to ICU due to hemoptysis. She has completed a course of Abx for PNA. Her presentation was concerning for a vasculitis and Rheumatology and nephrology  consulted. Serologic testing revealed microscopic polyangitis. Renal Biopsy done, pending. She has had plex, steroids, rituximab, cyclophosphamide, and CRRT. Nephro plans to continue HD. Tapering her steroids per rheum recs. Doing well on 2L NC. Stable for step down.       Interval History:   No events overnight.  Patient with no complaints this morning.  No dialysis today per Nephrology.  Planning for plasma exchange this afternoon per transfusion medicine.      Review of Systems   Constitutional:  Positive for appetite change and fatigue. Negative for activity change, chills, diaphoresis and fever.   HENT:  Negative for rhinorrhea and sore throat.    Respiratory:  Negative for cough, chest tightness and shortness of breath.    Cardiovascular:  Negative for chest pain and palpitations.   Gastrointestinal:  Negative for abdominal pain, constipation, diarrhea and nausea.   Endocrine: Negative for cold intolerance.   Genitourinary:  Negative for decreased urine volume and dysuria.   Musculoskeletal:  Negative for arthralgias and myalgias.   Skin:  Negative for rash and wound.   Neurological:  Positive for weakness. Negative for dizziness, numbness and headaches.   Psychiatric/Behavioral:  Negative for agitation, behavioral problems and confusion.    Objective:     Vital Signs (Most Recent):  Temp: 98.8 °F (37.1 °C) (11/01/22 1636)  Pulse: 77 (11/01/22 1636)  Resp: 18 (11/01/22 1636)  BP: 138/63 (11/01/22 1636)  SpO2: 97 % (11/01/22 1636) Vital Signs (24h Range):  Temp:  [97 °F (36.1 °C)-98.9 °F (37.2 °C)] 98.8 °F (37.1 °C)  Pulse:  [62-93] 77  Resp:  [13-22] 18  SpO2:  [89 %-100 %] 97 %  BP: (133-185)/(59-81) 138/63     Weight: 63 kg (139 lb)  Body mass index is 26.26 kg/m².    Intake/Output Summary (Last 24 hours) at 11/1/2022 1852  Last data filed at 11/1/2022 1849  Gross per 24 hour   Intake 8354.17 ml   Output 4221 ml   Net 4133.17 ml      Physical Exam  Vitals and nursing note reviewed.   Constitutional:        General: She is not in acute distress.     Appearance: She is well-developed. She is ill-appearing. She is not toxic-appearing.      Comments: Soft-spoken    HENT:      Head: Normocephalic and atraumatic.      Nose:      Comments: NG tube     Mouth/Throat:      Mouth: Mucous membranes are dry.   Eyes:      Conjunctiva/sclera: Conjunctivae normal.   Cardiovascular:      Rate and Rhythm: Normal rate and regular rhythm.      Heart sounds: Normal heart sounds.   Pulmonary:      Effort: Pulmonary effort is normal. No respiratory distress.      Breath sounds: No wheezing or rales.   Abdominal:      General: Bowel sounds are normal. There is no distension.      Palpations: Abdomen is soft.      Tenderness: There is no abdominal tenderness.   Musculoskeletal:         General: No tenderness. Normal range of motion.      Cervical back: Neck supple.      Right lower leg: No edema.      Left lower leg: No edema.   Lymphadenopathy:      Cervical: No cervical adenopathy.   Skin:     General: Skin is warm and dry.      Findings: No rash.   Neurological:      General: No focal deficit present.      Mental Status: She is alert and oriented to person, place, and time.   Psychiatric:         Mood and Affect: Mood normal.       Significant Labs: CBC:   Recent Labs   Lab 10/31/22  0224 11/01/22  0412   WBC 31.24* 27.35*   HGB 9.6* 8.0*   HCT 28.8* 25.2*    244     CMP:   Recent Labs   Lab 10/30/22  2159 10/31/22  0224 11/01/22  0412   * 148*  149* 150*   K 4.4 3.5  3.5 3.4*    108  108 110   CO2 21* 27  27 27   * 116*  116* 160*   * 132*  128* 143*   CREATININE 4.3* 4.2*  4.2* 4.7*   CALCIUM 8.4* 8.4*  8.4* 8.3*   PROT  --  5.4* 4.6*   ALBUMIN 3.0* 3.2*  3.1* 2.5*   BILITOT  --  0.9 0.4   ALKPHOS  --  65 65   AST  --  22 18   ALT  --  20 19   ANIONGAP 18* 13  14 13       Significant Imaging: I have reviewed all pertinent imaging results/findings within the past 24 hours.      Assessment/Plan:       * Microscopic polyangiitis  As of 10/26/22 strongly positive MPO Ab (in contrast to borderline positive PR3), consistent with clinical picture of microscopic polyangiitis with pulmonary, and renal involvement. Trialysis catheter placed overnight with plans for plasmapheresis early this AM. Pt tolerated procedure well despite multiple attempts to place line. Went for IR Renal biopsy this AM. Tolerated procedure well. Endorsing burning at szymanski insertion site. Continuing Azithromycin and Cefepime. Rheumatology planning to start rituximab and cyclophosphamide.      - Nephrology consulted  - Transfusion medicine consulted  - Rheumatology consulted  - Continue Plasmapheresis w/ plans for 7 treatments over 14-day period  -- Plan for 11/1, 11/2, 11/3  - Rituximab and cyclophosphamide initiated 10/28.   -- Next Rituximab 275 mg/m^2 due on November 3  -- Next cyclophosphamide 7.5 mg/kg due on November 10          - s/p 7 days of high dose steroids, tapering per rheumatology's recs  -- IV solumedrol 24mg daily x7 days  - Protonix daily  - PJP prophylaxis with atovaquone via NG    Acute hypoxemic respiratory failure  Microscopic polyangitis  Multifocal pneumonia  Hemoptysis  78 yo PMHx HTN, hypothyroid, HFpEF, OA admitted for fevers and respiratory symptoms, treated while on Hospital Medicine for multifocal PNA. Course complicated by GIB bleed (no EGD yet d/t PNA), hyponatremia, ATN. MICU consulted for rapidly increasing oxygen requirement (4L NC to BiPAP 15/10 50%), respiratory distress/work of breathing, somnolence. Nephrology consulted for ATN, concerns for possible nephritic disease as  behind ATN d/t acanthrocytes. Reports of scant hemoptysis by nursing staff 10/21, 10/22.     Imaging: CXR: Diffuse bilateral airspace infiltrates w/ c/f alveolar fillings; CT chest wc 10/17 with bl perihilar dense consolidations w/ peripheral patcy areas of GGO, BL PNA, less c/f cardiogenic pulmonary edema.  Labs: WBC uptrending  28.97, Hgb lowest 6.0 (s/p 2 u PRBC), continues to downtrend approx 1 point / day. sCr uptrending, (baseline 1.0-1.2). .        No results for input(s): PH, PCO2, PO2, HCO3, POCSATURATED, BE in the last 72 hours.  Etiology: Microscopic polyangitis likely. Concerns for possible PE as patient was not on thrombotic ppx s/o GIB. Incomplete I/Os charted, though reports of low UOP also renders pulmonary congestion edema as a possibility. Less c/f acute progression of multifocal PNA as adequate ABx coverage and no new fevers recently.     - US DVT BLE unremarkable  - Consideration for urgent BAL +/- repeat chest imaging (last done 10/17)  - Rheumatology consulted  -- Continue Rituximab, cyclophosphamide  -- IV solumedrol per Rheumatology  -- PJP prophylaxis  - Nephrology consulted  -- iHD nephrology  - Transfusion medicine  -- Plasmapheresis started 10/26, plans for 7 cycles  - Neuro checks  - Wean supplemental O2 as tolerated    Acute renal failure superimposed on stage 3 chronic kidney disease  Patient w/o CKD presenting with WILFREDO with rapidly increasing sCr (baseline sCr 1.0-1.2). New onset proteinuria/hematuria in last 30 days. .  DDx: ATN vs GN.       - Renal Bx done, f/u pathology  - High-dose steroids completed, tapering per rheumatology recs.  - Rituximab and cytoxan per rheum recs.  - Undergoing PLEX, see transfusion medicine recs  - Trend sCr  - Strict I&Os  - Avoid nephrotoxic agents  - Renally adjust medications  - Nephro and Rheumatology following    Chronic diastolic heart failure  Pulmonary Hypertension   TTE (10/2022) EF 65%, G1DD  Home meds: Lisinopril, Toprol     - Volume control plan per Acute Hypoxemic Respiratory Failure A/P  - Daily weights (standing if tolerated)  - Strict I/Os  - Fluid restriction 1.5 day  - Cardiac diet w/ 2 g Na restriction      Dysuria  - UA 10/30 not consistent with urinary tract infection  - Unable to take Pyridium due to renal function      Hypernatremia  Adjust  free water flushes as indicated well patient has NGT for tube feeds      Moderate malnutrition  Nutrition consulted. Most recent weight and BMI monitored-          Malnutrition (Moderate to Severe)  Weight Loss (Malnutrition): 7.5% in 3 months              Measurements:  Wt Readings from Last 1 Encounters:   10/27/22 63.5 kg (139 lb 15.9 oz)   Body mass index is 26.45 kg/m².    Recommendations: Recommendation/Intervention: 1. Pending NGT placement. When able, initiate TFs. Rec'd Novasource @ 35 mL/hr to provide 1680 kcals, 76 g of protein, 602 mL fluid. 2. RD to monitor & follow-up.  Goals: Meet % EEN, EPN by RD f/u date    Patient has been screened and assessed by RD. RD will follow patient.      Septic shock  See above    Acute renal failure  See microscopic polyangitis    Cervical adenopathy  See above      Lymphadenopathy of head and neck  - Has bilateral neck gland swelling with tenderness,consulted ENT  - No surgical intervention indicated   - Adenopathy likely reactive in nature   - Recommend further workup if it does not resolve within next 2 weeks     Multifocal pneumonia  See above      Pulmonary HTN  See above      CKD (chronic kidney disease), stage III  See microscopic polyangitis    HTN (hypertension)  - Continue home amlodipine and lisinopril  - PRN hydralazine      Hypothyroid  - Continue synthroid 25 mcg  - TSH 0.585 10/27/22      VTE Risk Mitigation (From admission, onward)         Ordered     heparin (porcine) injection 3,200 Units  Once         11/01/22 1328     Place sequential compression device  Until discontinued         10/25/22 1731     IP VTE HIGH RISK PATIENT  Once         10/17/22 1354     Reason for No Pharmacological VTE Prophylaxis  Once        Question:  Reasons:  Answer:  Risk of Bleeding    10/17/22 1354                Discharge Planning   ANTHONY: 11/4/2022     Code Status: Full Code   Is the patient medically ready for discharge?: No    Reason for patient still in hospital  (select all that apply): Patient trending condition, Laboratory test, Treatment, Consult recommendations and Pending disposition  Discharge Plan A: Home with family                  Ethan De Jesus MD  Department of Hospital Medicine   [facility] - Intensive Care ([place])

## 2022-11-02 NOTE — SUBJECTIVE & OBJECTIVE
Interval History:   No events overnight.  Underwent PLEX yesterday without issue.  No complaints today.      Review of Systems  Objective:     Vital Signs (Most Recent):  Temp: 98.6 °F (37 °C) (11/02/22 1556)  Pulse: 76 (11/02/22 1556)  Resp: 19 (11/02/22 1556)  BP: 132/60 (11/02/22 1556)  SpO2: 96 % (11/02/22 1556) Vital Signs (24h Range):  Temp:  [97.7 °F (36.5 °C)-98.7 °F (37.1 °C)] 98.6 °F (37 °C)  Pulse:  [] 76  Resp:  [14-33] 19  SpO2:  [96 %-100 %] 96 %  BP: (132-189)/(60-84) 132/60     Weight: 63 kg (139 lb)  Body mass index is 26.26 kg/m².    Intake/Output Summary (Last 24 hours) at 11/2/2022 1805  Last data filed at 11/2/2022 1435  Gross per 24 hour   Intake 4709.17 ml   Output 2959 ml   Net 1750.17 ml      Physical Exam  Vitals and nursing note reviewed.   Constitutional:       General: She is not in acute distress.     Appearance: She is well-developed. She is ill-appearing. She is not toxic-appearing.      Comments: Soft-spoken    HENT:      Head: Normocephalic and atraumatic.      Nose:      Comments: NG tube     Mouth/Throat:      Mouth: Mucous membranes are dry.   Eyes:      Conjunctiva/sclera: Conjunctivae normal.   Cardiovascular:      Rate and Rhythm: Normal rate and regular rhythm.      Heart sounds: Normal heart sounds.   Pulmonary:      Effort: Pulmonary effort is normal. No respiratory distress.      Breath sounds: No wheezing or rales.   Abdominal:      General: Bowel sounds are normal. There is no distension.      Palpations: Abdomen is soft.      Tenderness: There is no abdominal tenderness.   Musculoskeletal:         General: No tenderness. Normal range of motion.      Cervical back: Neck supple.      Right lower leg: No edema.      Left lower leg: No edema.   Lymphadenopathy:      Cervical: No cervical adenopathy.   Skin:     General: Skin is warm and dry.      Findings: No rash.   Neurological:      General: No focal deficit present.      Mental Status: She is alert and oriented to  person, place, and time.   Psychiatric:         Mood and Affect: Mood normal.       Significant Labs: CBC:   Recent Labs   Lab 11/01/22  0412 11/02/22  0554   WBC 27.35* 21.91*   HGB 8.0* 8.1*   HCT 25.2* 24.2*    193     CMP:   Recent Labs   Lab 11/01/22  0412 11/02/22  0554   * 145   K 3.4* 3.7    105   CO2 27 28   * 127*   * 140*   CREATININE 4.7* 4.6*   CALCIUM 8.3* 8.5*   PROT 4.6* 5.3*   ALBUMIN 2.5* 3.1*   BILITOT 0.4 0.6   ALKPHOS 65 70   AST 18 30   ALT 19 20   ANIONGAP 13 12       Significant Imaging: I have reviewed all pertinent imaging results/findings within the past 24 hours.

## 2022-11-02 NOTE — PROCEDURES
[facility] - Cardiac Medical ICU  Transfusion Medicine  Procedure Note    SUMMARY   Therapeutic Plasma Exchange (Apheresis)    Date/Time: 11/2/2022 4:23 PM  Performed by: Francisco J Jackson MD  Authorized by: Francisco J Jackson MD       Date of Procedure: 11/2/2022     Procedure: Plasma Exchange    Provider: Francisco J Jackson MD     Assisting Provider: None    Pre-Procedure Diagnosis: Microscopic polyangiitis    Post-Procedure Diagnosis: Microscopic polyangiitis    Follow-up Assessment: Ms. Goodman is a  76 yo F with chronic diastolic heart failure, HTN and recent ED visit on 9/24 for PNA with complaints of cough x 1 week and hemoptysis x 1 on 9/24 who returned on 10/14 and 0/17 with complaints of dyspnea. She was subsequently admitted after her last ED visit for respiratory failure with 88% oxygen saturation. CT chest with contrast on 10/17 showed evidence of interval progression of bilateral perihilar dense consolidation with peripheral patchy areas of ground-glass opacification nonspecific as to the etiology.  Worsening pneumonia as well as inflammatory etiologies were considered. On admission patient's creatinine at 1.6 (10/17) with worsening progression (currently @ 5.7). 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 Thus far, patient has received pulse steroids with future plans for Rituximab and Cyclophosphamide. Transfusion Medicine consulted for apheresis support.    Vasculitis, ANCA-associated with DAH carries a Category I Grade 1A indication for therapeutic apheresis via the 2016 Journal of Clinical Apheresis Guidelines (Lewis J et al. Journal of Clinical Apheresis 2016; 31:149-162.)    Interval History:  Today's procedure was well tolerated and without complications. Last treatment scheduled for 11/3.    Pertinent Laboratory Data: Complete Blood Count:   Lab Results   Component Value Date    HGB 8.1 (L) 11/02/2022    HCT 24.2 (L)  11/02/2022     11/02/2022    WBC 21.91 (H) 11/02/2022     Comprehensive Metabolic Panel:   Lab Results   Component Value Date     11/02/2022    K 3.7 11/02/2022     11/02/2022    CO2 28 11/02/2022     (H) 11/02/2022     (H) 11/02/2022    CREATININE 4.6 (H) 11/02/2022    CALCIUM 8.5 (L) 11/02/2022    PROT 5.3 (L) 11/02/2022    ALBUMIN 3.1 (L) 11/02/2022    BILITOT 0.6 11/02/2022    ALKPHOS 70 11/02/2022    AST 30 11/02/2022    ALT 20 11/02/2022    ANIONGAP 12 11/02/2022    EGFRNONAA 44 (A) 04/18/2022       Pertinent Medications:     Current Facility-Administered Medications:     0.9%  NaCl infusion (for blood administration), , Intravenous, Q24H PRN, Madie Lancaster MD    0.9%  NaCl infusion (for blood administration), , Intravenous, Q24H PRN, Madie Lancaster MD    acetaminophen tablet 650 mg, 650 mg, Oral, Q4H PRN, Magui Cage MD, 650 mg at 11/01/22 1045    [START ON 11/3/2022] albumin human 5% bottle 50 g, 50 g, Intravenous, Once, Francisco J Jackson MD    albuterol-ipratropium 2.5 mg-0.5 mg/3 mL nebulizer solution 3 mL, 3 mL, Nebulization, Q4H PRN, Kandace Smith PA-C, 3 mL at 11/02/22 0746    aluminum-magnesium hydroxide-simethicone 200-200-20 mg/5 mL suspension 30 mL, 30 mL, Oral, QID PRN, Kandace Smith PA-C    amLODIPine tablet 10 mg, 10 mg, Per NG tube, Daily, Magui Cage MD, 10 mg at 11/02/22 0939    atovaquone 750 mg/5 mL oral liquid 1,500 mg, 1,500 mg, Per NG tube, Daily, Magui Cage MD, 1,500 mg at 11/02/22 0942    bisacodyL suppository 10 mg, 10 mg, Rectal, Daily PRN, Kandace Smith PA-C    [START ON 11/3/2022] calcium gluconate 1 g in sodium chloride 0.9% 100 mL IVPB, 1 g, Intravenous, Once, Francisco J Jackson MD    carvediloL tablet 12.5 mg, 12.5 mg, Per NG tube, BID, Ethan De Jesus MD    dextrose 10% bolus 125 mL, 12.5 g, Intravenous, PRN, Immanuel Peres MD    dextrose 10% bolus 250 mL, 25 g, Intravenous, PRN, Immanuel YOUNG  MD Ja    [START ON 11/3/2022] diphenhydrAMINE injection 50 mg, 50 mg, Intravenous, Once, Francisco J Jackson MD    glucagon (human recombinant) injection 1 mg, 1 mg, Intramuscular, PRN, Magui Cage MD    glucose chewable tablet 16 g, 16 g, Oral, PRN, Kandace Smith PA-C    glucose chewable tablet 24 g, 24 g, Oral, PRN, Kandace Smith PA-C    [START ON 11/3/2022] heparin (porcine) injection 3,200 Units, 3,200 Units, Intravenous, Once, Francisco J Jackson MD    hydrALAZINE tablet 25 mg, 25 mg, Per NG tube, Q8H, Ethan De Jesus MD    hydrALAZINE tablet 50 mg, 50 mg, Per NG tube, Q8H PRN, Ethan De Jesus MD    insulin aspart U-100 pen 1-10 Units, 1-10 Units, Subcutaneous, Q6H PRN, Magui Cage MD, 4 Units at 11/02/22 1212    levothyroxine tablet 25 mcg, 25 mcg, Per NG tube, Before breakfast, Magui Cage MD, 25 mcg at 11/02/22 0548    lisinopriL tablet 40 mg, 40 mg, Per NG tube, Daily, Magui Cage MD, 40 mg at 11/02/22 0939    melatonin tablet 6 mg, 6 mg, Oral, Nightly PRN, Kandace Smith PA-C, 6 mg at 10/31/22 2039    methocarbamoL tablet 500 mg, 500 mg, Oral, TID PRN, Kandace Smith PA-C, 500 mg at 10/30/22 0058    methylPREDNISolone sodium succinate injection 24 mg, 24 mg, Intravenous, Daily, Magui Cage MD, 24 mg at 11/02/22 0943    naloxone 0.4 mg/mL injection 0.02 mg, 0.02 mg, Intravenous, PRN, Kandace Smith PA-C    ondansetron disintegrating tablet 8 mg, 8 mg, Oral, Q8H PRN, Kandace Smith PA-C, 8 mg at 10/31/22 0606    pantoprazole suspension 40 mg, 40 mg, Per NG tube, Daily, Ethan De Jesus MD, 40 mg at 11/02/22 0942    prochlorperazine injection Soln 5 mg, 5 mg, Intravenous, Q6H PRN, Kandace Smith PA-C, 5 mg at 10/24/22 0001    QUEtiapine tablet 25 mg, 25 mg, Oral, QHS, Jonathon Dupree MD, 25 mg at 11/01/22 2020    simethicone chewable tablet 80 mg, 1 tablet, Oral, QID PRN, Kandace Smith PA-C, 80 mg at 10/22/22 1133    sodium chloride 0.9% 250 mL  flush bag, , Intravenous, 1 time in Clinic/HOD, Woo Palacio MD, Held at 10/27/22 1700    sodium chloride 0.9% flush 10 mL, 10 mL, Intravenous, PRN, Woo Palacio MD    sodium chloride 0.9% flush 5 mL, 5 mL, Intravenous, PRN, Kandace Smith PA-C    Facility-Administered Medications Ordered in Other Encounters:     celecoxib capsule 400 mg, 400 mg, Oral, Once, Dieudonne Marshall MD    fentaNYL 50 mcg/mL injection  mcg,  mcg, Intravenous, PRN, Dieudonne Marshall MD, 100 mcg at 12/21/21 0919    LIDOcaine (PF) 10 mg/ml (1%) injection 10 mg, 1 mL, Intradermal, Once PRN, Dieudonne Marshall MD    LIDOcaine (PF) 10 mg/ml (1%) injection 10 mg, 1 mL, Intradermal, Once, Dieudonne Marshall MD    midazolam (VERSED) 1 mg/mL injection 0.5-4 mg, 0.5-4 mg, Intravenous, PRN, Dieudonne Marshall MD, 2 mg at 12/21/21 0919    ropivacaine 0.2% [facility] PainPRO Pump infusion 500 ML, , Perineural, Continuous, Dieudonne Marshall MD, New Bag at 12/21/21 1153       Review of patient's allergies indicates:   Allergen Reactions    Ampicillin     Peaches [peach (prunus persica)] Other (See Comments)     Pt unable to state type of reaction. Information obtained from daughter who states she was informed of allergy from patient.    Penicillins      Other reaction(s): Hives    Sulfa (sulfonamide antibiotics) Rash and Hives       Anesthesia: None     Technical Procedures Used: Plasma Exchange: Volume exchanged - 2.5 Liters; Replacement fluid - Albumin/Fresh Frozen Plasma; Number of procedures 6; Date of next procedure 11/3.    Description of the Findings of the Procedure:     Please see Apheresis Nurse flowsheet for details.    The patient was evaluated and all clinical and laboratory data relevant to the treatment was reviewed, and a decision was made to proceed with the Apheresis procedure.    I was available to the clinical staff throughout the procedure.    Significant Surgical Tasks Conducted by the  Assistant(s): Not applicable    Complications: None    Estimated Blood Loss (EBL): None    Implants: None     Specimens: None    Francisco J Jackson M.D., M.S., [facility]  Medical Director  Section of Transfusion Medicine & Blood Donor Services  Department of Pathology and Laboratory Medicine  [facility]  [phone] (Blood Bank)  11/02/2022

## 2022-11-02 NOTE — ASSESSMENT & PLAN NOTE
Microscopic polyangitis  Multifocal pneumonia  Hemoptysis  78 yo PMHx HTN, hypothyroid, HFpEF, OA admitted for fevers and respiratory symptoms, treated while on Hospital Medicine for multifocal PNA. Course complicated by GIB bleed (no EGD yet d/t PNA), hyponatremia, ATN. MICU consulted for rapidly increasing oxygen requirement (4L NC to BiPAP 15/10 50%), respiratory distress/work of breathing, somnolence. Nephrology consulted for ATN, concerns for possible nephritic disease as  behind ATN d/t acanthrocytes. Reports of scant hemoptysis by nursing staff 10/21, 10/22.     Imaging: CXR: Diffuse bilateral airspace infiltrates w/ c/f alveolar fillings; CT chest wc 10/17 with bl perihilar dense consolidations w/ peripheral patcy areas of GGO, BL PNA, less c/f cardiogenic pulmonary edema.  Labs: WBC uptrending 28.97, Hgb lowest 6.0 (s/p 2 u PRBC), continues to downtrend approx 1 point / day. sCr uptrending, (baseline 1.0-1.2). .        No results for input(s): PH, PCO2, PO2, HCO3, POCSATURATED, BE in the last 72 hours.  Etiology: Microscopic polyangitis likely. Concerns for possible PE as patient was not on thrombotic ppx s/o GIB. Incomplete I/Os charted, though reports of low UOP also renders pulmonary congestion edema as a possibility. Less c/f acute progression of multifocal PNA as adequate ABx coverage and no new fevers recently.     - US DVT BLE unremarkable  - Consideration for urgent BAL +/- repeat chest imaging (last done 10/17)  - Rheumatology consulted  -- Continue Rituximab, cyclophosphamide  -- IV solumedrol per Rheumatology  -- PJP prophylaxis  - Nephrology consulted  -- iHD nephrology  - Transfusion medicine  -- PLEX started 10/26, plans for 7 cycles  - Neuro checks  - Wean supplemental O2 as tolerated

## 2022-11-02 NOTE — PROGRESS NOTES
Apheresis tx started at 1325 without difficulty. Pt AAOx4.  2.5 Liter exchange.  Replacement fluids 1.5 FFP and 1L Albumin, 50mg Benadryl, 2g Calcium. Tx ended at  1433. Catheter clamped, flushed, and Hep locked per protocol. Pt tolerated tx well, VSS, NAD. Next tx on 11/3/2022. Sister is bedside.

## 2022-11-02 NOTE — CARE UPDATE
"RAPID RESPONSE NURSE CHART REVIEW       Chart Reviewed: 11/02/2022, 10:32 AM    MRN: [id number]  Bed: 58332/42382 A    Dx: Microscopic polyangiitis    Kristin Goodman has a past medical history of Acute blood loss anemia, Acute hypoxemic respiratory failure, Allergy, Back pain, Chronic diastolic heart failure, Chronic diastolic heart failure, Colon polyp, Disorder of kidney and ureter, H/O Bell's palsy, Helicobacter pylori (H. pylori), HTN (hypertension), Hypothyroid, OA (osteoarthritis), DONAVAN (obstructive sleep apnea), Pneumonia due to other staphylococcus, Pulmonary HTN, Sepsis due to pneumonia, Septic shock, Trouble in sleeping, and Urinary incontinence.    Last VS: BP (!) 189/84   Pulse 79   Temp 98.7 °F (37.1 °C) (Oral)   Resp 20   Ht 5' 1" (1.549 m)   Wt 63 kg (139 lb)   SpO2 98%   BMI 26.26 kg/m²     24H Vital Sign Range:  Temp:  [97 °F (36.1 °C)-98.8 °F (37.1 °C)]   Pulse:  [71-86]   Resp:  [16-28]   BP: (133-189)/(59-84)   SpO2:  [96 %-100 %]     Level of Consciousness (AVPU): alert    Recent Labs     10/31/22  0224 11/01/22  0412 11/02/22  0554   WBC 31.24* 27.35* 21.91*   HGB 9.6* 8.0* 8.1*   HCT 28.8* 25.2* 24.2*    244 193       Recent Labs     10/31/22  0224 11/01/22  0412 11/02/22  0554   *  149* 150* 145   K 3.5  3.5 3.4* 3.7     108 110 105   CO2 27  27 27 28   CREATININE 4.2*  4.2* 4.7* 4.6*   *  116* 160* 127*   PHOS 5.5*  5.4* 5.4* 5.3*   MG 2.1  2.1 2.0 1.9        No results for input(s): PH, PCO2, PO2, HCO3, POCSATURATED, BE in the last 72 hours.     OXYGEN:  Flow (L/min): 1.5  Oxygen Concentration (%): 50  O2 Device (Oxygen Therapy): nasal cannula    MEWS score: 1    Charge RN, [place]  contacted. No concerns verbalized at this time. Instructed to call [phone] for further concerns or assistance.    Padma Herman RN        "

## 2022-11-02 NOTE — PLAN OF CARE
Patient not medically ready for DC and medical  work up for sepsis still in progress. Needs are TBD and CM to follow for DC planning

## 2022-11-02 NOTE — ASSESSMENT & PLAN NOTE
Patient w/o CKD presenting with WILFREDO with rapidly increasing sCr (baseline sCr 1.0-1.2). New onset proteinuria/hematuria in last 30 days. .  DDx: ATN vs GN.       - Renal Bx done, f/u pathology  - High-dose steroids completed, tapering per rheumatology recs.  - Rituximab and cytoxan per rheum recs.  - Undergoing PLEX, see transfusion medicine recs  - iHD per nephrology  - Trend sCr  - Strict I&Os  - Avoid nephrotoxic agents  - Renally adjust medications  - Monitor for renal recovery

## 2022-11-02 NOTE — ASSESSMENT & PLAN NOTE
- Continue to titrate antihypertensives  - Continue Amlodipine 10mg and Lisinopril 40mg  - Coreg 12.5mg, titrate as HR tolerates  - Hydralazine 25mg every 8 hours  - PRN hydralazine

## 2022-11-02 NOTE — PROGRESS NOTES
[facility] - Intensive Care ([place] [address])  [facility] Medicine  Progress Note    Patient Name: Kristin Goodman  MRN: [id number]  Patient Class: IP- Inpatient   Admission Date: 10/17/2022  Length of Stay: 16 days  Attending Physician: Ethan De Jesus MD  Primary Care Provider: Lori Hernandez MD        Subjective:     Principal Problem:Microscopic polyangiitis        HPI:  Kristin Goodman is a 77 y.o. female with a past medical history of HTN, hypothyroidism, HFpEF, and osteoarthritis of the arm who has presented to the ED for cough, SOB, and weakness. Daughter is present at the bedside. Patient presented to the ED on 9/24 with hemoptysis, CT and CXR showed high suspicion for multilobar pneumonia. Patient was discharged with a 10-day course of levofloxacin and an albuterol inhaler. Patient followed up with her PCP on 10/4 with slight improvement in symptoms and went back to work. Patient continued to have worsening symptoms and presented to the ED again on 10/14 for evaluation and no interventions were done. Patient endorses fevers over the last few days up to 102.4 F with progressive SOB. She endorses hemoptysis with moderate amount of blood, generalized weakness, productive cough, SOB, and loss of appetite. Denies chest pain, nausea, vomiting, abdominal pain, or urinary changes.    ED: hypertensive up to 219/93 and tachycardic up to 117. Oxygen saturation on 92% on RA, placed on 5L NC with sats >97%. CBC remarkable for leukocytosis of 16.03 and Hb 7.7. K 3.3. Cr 1.6, baseline ~1.0. . Troponin 0.061. COVID and flu negative. EKG NSR. CT chest with contrast pending at time of admission. Given home amlodipine and lisinopril. Given 500mL NS, IV azithromycin, cefepime and vanc.       Overview/Hospital Course:  Initially admitted to hospital medicine, but stepped up to ICU due to hemoptysis. She has completed a course of Abx for PNA. Her presentation was concerning for a vasculitis and Rheumatology and nephrology  consulted. Serologic testing revealed microscopic polyangitis. Renal Biopsy done, pending. She has had plex, steroids, rituximab, cyclophosphamide, and CRRT. Nephro plans to continue HD. Tapering her steroids per rheum recs. Doing well on 2L NC. Stable for step down.       Interval History:   No events overnight.  Underwent PLEX yesterday without issue.  No complaints today.      Review of Systems  Objective:     Vital Signs (Most Recent):  Temp: 98.6 °F (37 °C) (11/02/22 1556)  Pulse: 76 (11/02/22 1556)  Resp: 19 (11/02/22 1556)  BP: 132/60 (11/02/22 1556)  SpO2: 96 % (11/02/22 1556) Vital Signs (24h Range):  Temp:  [97.7 °F (36.5 °C)-98.7 °F (37.1 °C)] 98.6 °F (37 °C)  Pulse:  [] 76  Resp:  [14-33] 19  SpO2:  [96 %-100 %] 96 %  BP: (132-189)/(60-84) 132/60     Weight: 63 kg (139 lb)  Body mass index is 26.26 kg/m².    Intake/Output Summary (Last 24 hours) at 11/2/2022 1805  Last data filed at 11/2/2022 1435  Gross per 24 hour   Intake 4709.17 ml   Output 2959 ml   Net 1750.17 ml      Physical Exam  Vitals and nursing note reviewed.   Constitutional:       General: She is not in acute distress.     Appearance: She is well-developed. She is ill-appearing. She is not toxic-appearing.      Comments: Soft-spoken    HENT:      Head: Normocephalic and atraumatic.      Nose:      Comments: NG tube     Mouth/Throat:      Mouth: Mucous membranes are dry.   Eyes:      Conjunctiva/sclera: Conjunctivae normal.   Cardiovascular:      Rate and Rhythm: Normal rate and regular rhythm.      Heart sounds: Normal heart sounds.   Pulmonary:      Effort: Pulmonary effort is normal. No respiratory distress.      Breath sounds: No wheezing or rales.   Abdominal:      General: Bowel sounds are normal. There is no distension.      Palpations: Abdomen is soft.      Tenderness: There is no abdominal tenderness.   Musculoskeletal:         General: No tenderness. Normal range of motion.      Cervical back: Neck supple.      Right lower  leg: No edema.      Left lower leg: No edema.   Lymphadenopathy:      Cervical: No cervical adenopathy.   Skin:     General: Skin is warm and dry.      Findings: No rash.   Neurological:      General: No focal deficit present.      Mental Status: She is alert and oriented to person, place, and time.   Psychiatric:         Mood and Affect: Mood normal.       Significant Labs: CBC:   Recent Labs   Lab 11/01/22  0412 11/02/22  0554   WBC 27.35* 21.91*   HGB 8.0* 8.1*   HCT 25.2* 24.2*    193     CMP:   Recent Labs   Lab 11/01/22  0412 11/02/22  0554   * 145   K 3.4* 3.7    105   CO2 27 28   * 127*   * 140*   CREATININE 4.7* 4.6*   CALCIUM 8.3* 8.5*   PROT 4.6* 5.3*   ALBUMIN 2.5* 3.1*   BILITOT 0.4 0.6   ALKPHOS 65 70   AST 18 30   ALT 19 20   ANIONGAP 13 12       Significant Imaging: I have reviewed all pertinent imaging results/findings within the past 24 hours.      Assessment/Plan:      * Microscopic polyangiitis  As of 10/26/22 strongly positive MPO Ab (in contrast to borderline positive PR3), consistent with clinical picture of microscopic polyangiitis with pulmonary, and renal involvement. Trialysis catheter placed overnight with plans for plasmapheresis early this AM. Pt tolerated procedure well despite multiple attempts to place line. Went for IR Renal biopsy this AM. Tolerated procedure well. Endorsing burning at szymanski insertion site. Continuing Azithromycin and Cefepime. Rheumatology planning to start rituximab and cyclophosphamide.      - Nephrology consulted  - Transfusion medicine consulted  - Rheumatology consulted  - Continue Plasmapheresis w/ plans for 7 treatments over 14-day period  - Rituximab and cyclophosphamide initiated 10/28.   -- Next Rituximab 275 mg/m^2 due on November 3  -- Next cyclophosphamide 7.5 mg/kg due on November 10          - s/p 7 days of high dose steroids, tapering per rheumatology's recs  -- IV solumedrol 24mg daily x7 days  - Protonix daily  -  PJP prophylaxis with atovaquone via NG    Acute hypoxemic respiratory failure  Microscopic polyangitis  Multifocal pneumonia  Hemoptysis  78 yo PMHx HTN, hypothyroid, HFpEF, OA admitted for fevers and respiratory symptoms, treated while on Hospital Medicine for multifocal PNA. Course complicated by GIB bleed (no EGD yet d/t PNA), hyponatremia, ATN. MICU consulted for rapidly increasing oxygen requirement (4L NC to BiPAP 15/10 50%), respiratory distress/work of breathing, somnolence. Nephrology consulted for ATN, concerns for possible nephritic disease as  behind ATN d/t acanthrocytes. Reports of scant hemoptysis by nursing staff 10/21, 10/22.     Imaging: CXR: Diffuse bilateral airspace infiltrates w/ c/f alveolar fillings; CT chest wc 10/17 with bl perihilar dense consolidations w/ peripheral patcy areas of GGO, BL PNA, less c/f cardiogenic pulmonary edema.  Labs: WBC uptrending 28.97, Hgb lowest 6.0 (s/p 2 u PRBC), continues to downtrend approx 1 point / day. sCr uptrending, (baseline 1.0-1.2). .        No results for input(s): PH, PCO2, PO2, HCO3, POCSATURATED, BE in the last 72 hours.  Etiology: Microscopic polyangitis likely. Concerns for possible PE as patient was not on thrombotic ppx s/o GIB. Incomplete I/Os charted, though reports of low UOP also renders pulmonary congestion edema as a possibility. Less c/f acute progression of multifocal PNA as adequate ABx coverage and no new fevers recently.     - US DVT BLE unremarkable  - Consideration for urgent BAL +/- repeat chest imaging (last done 10/17)  - Rheumatology consulted  -- Continue Rituximab, cyclophosphamide  -- IV solumedrol per Rheumatology  -- PJP prophylaxis  - Nephrology consulted  -- iHD nephrology  - Transfusion medicine  -- PLEX started 10/26, plans for 7 cycles  - Neuro checks  - Wean supplemental O2 as tolerated    Acute renal failure superimposed on stage 3 chronic kidney disease  Patient w/o CKD presenting with WILFREDO with rapidly  increasing sCr (baseline sCr 1.0-1.2). New onset proteinuria/hematuria in last 30 days. .  DDx: ATN vs GN.       - Renal Bx done, f/u pathology  - High-dose steroids completed, tapering per rheumatology recs.  - Rituximab and cytoxan per rheum recs.  - Undergoing PLEX, see transfusion medicine recs  - iHD per nephrology  - Trend sCr  - Strict I&Os  - Avoid nephrotoxic agents  - Renally adjust medications  - Monitor for renal recovery    HTN (hypertension)  - Continue to titrate antihypertensives  - Continue Amlodipine 10mg and Lisinopril 40mg  - Coreg 12.5mg, titrate as HR tolerates  - Hydralazine 25mg every 8 hours  - PRN hydralazine      Chronic diastolic heart failure  Pulmonary Hypertension   TTE (10/2022) EF 65%, G1DD  Home meds: Lisinopril, Toprol     - Volume control plan per Acute Hypoxemic Respiratory Failure A/P  - Daily weights (standing if tolerated)  - Strict I/Os  - Fluid restriction 1.5 day  - Cardiac diet w/ 2 g Na restriction      Dysuria  - UA 10/30 not consistent with urinary tract infection  - Unable to take Pyridium due to renal function      Dysphagia  SLP following      Hypernatremia  Adjust free water flushes as indicated well patient has NGT for tube feeds      Moderate malnutrition  Nutrition consulted. Most recent weight and BMI monitored-          Malnutrition (Moderate to Severe)  Weight Loss (Malnutrition): 7.5% in 3 months              Measurements:  Wt Readings from Last 1 Encounters:   10/27/22 63.5 kg (139 lb 15.9 oz)   Body mass index is 26.45 kg/m².    Recommendations: Recommendation/Intervention: 1. Pending NGT placement. When able, initiate TFs. Rec'd Novasource @ 35 mL/hr to provide 1680 kcals, 76 g of protein, 602 mL fluid. 2. RD to monitor & follow-up.  Goals: Meet % EEN, EPN by RD f/u date    Patient has been screened and assessed by RD. RD will follow patient.      Septic shock  See above    Acute renal failure  See microscopic polyangitis    Cervical  adenopathy  See above      Lymphadenopathy of head and neck  - Has bilateral neck gland swelling with tenderness,consulted ENT  - No surgical intervention indicated   - Adenopathy likely reactive in nature   - Recommend further workup if it does not resolve within next 2 weeks     Multifocal pneumonia  See above      Pulmonary HTN  See above      CKD (chronic kidney disease), stage III  See microscopic polyangitis    Hypothyroid  - Continue synthroid 25 mcg  - TSH 0.585 10/27/22      VTE Risk Mitigation (From admission, onward)         Ordered     heparin (porcine) injection 3,200 Units  Once         11/02/22 1344     Place sequential compression device  Until discontinued         10/25/22 1731     IP VTE HIGH RISK PATIENT  Once         10/17/22 1354     Reason for No Pharmacological VTE Prophylaxis  Once        Question:  Reasons:  Answer:  Risk of Bleeding    10/17/22 1354                Discharge Planning   ANTHONY: 11/4/2022     Code Status: Full Code   Is the patient medically ready for discharge?: No    Reason for patient still in hospital (select all that apply): Patient trending condition, Laboratory test, Treatment, Consult recommendations and Pending disposition  Discharge Plan A: Home with family                  Ethan De Jesus MD  Department of Hospital Medicine   [facility] - Intensive Care ([facility]

## 2022-11-02 NOTE — PT/OT/SLP PROGRESS
"Speech Language Pathology Treatment    Patient Name:  Kristin Goodman   MRN:  [id number]  Admitting Diagnosis: Microscopic polyangiitis    Recommendations:                 General Recommendations:  Dysphagia therapy, MBSS  Diet recommendations:  NPO, Liquid Diet Level: NPO   Aspiration Precautions: Strict aspiration precautions   General Precautions: Standard, aspiration, fall  Communication strategies:  go to room if call light pushed    Subjective     Pt awake/alert. RN cleared pt prior to treatment.  "I don't like the chocolate."    Pain/Comfort:  Pain Rating 1: 0/10    Respiratory Status: Nasal cannula, flow 2 L/min    Objective:     Has the patient been evaluated by SLP for swallowing?   Yes  Keep patient NPO? Yes   Pt found awake/alert in bed with nasal cannula and NG tube in place. Family member at bedside. RN reported pt took medication whole today without any difficulty. Coated lingual surface and reduced lingual strength observed upon review of the oral mechanism. Pt demonstrated reduced strength when coughing on command. Pt seen with thin liquid (cup sips x2, straw sips x2) and puree x2 tsp (chocolate pudding, apple sauce). Labored breathing and exertion observed across all trials. Pt with facial grimacing, lingual pumping, multiple swallows, and delayed initiation of swallow with thin and puree trials. Pt reported pain at neck IV site upon swallow. SLP provided education on aspiration precautions, MBS procedure, need for NPO diet and SLP POC. Pt and family member verbalized understanding.     Assessment:     Kristin Goodman is a 77 y.o. female with an SLP diagnosis of Dysphagia.   Goals:   Multidisciplinary Problems       SLP Goals          Problem: SLP    Goal Priority Disciplines Outcome   SLP Goal     SLP Ongoing, Progressing   Description: Speech Language Pathology Goals  Goals expected to be met by 11/6/22    1. Pt will participate in ongoing assessment of swallow function to determine safest, least " restrictive means of nutrition/hydration  2. Educate Pt and family on aspiration precautions and SLP POC                         Plan:     Patient to be seen:  4 x/week   Plan of Care expires:  11/29/22  Plan of Care reviewed with:  patient, family   SLP Follow-Up:  Yes       Discharge recommendations:  other (see comments) (per pt progress)   Barriers to Discharge:  None    Time Tracking:     SLP Treatment Date:   11/02/22  Speech Start Time:  1133  Speech Stop Time:  1151     Speech Total Time (min):  18 min    Billable Minutes: Treatment Swallowing Dysfunction 10 and Self Care/Home Management Training 8    11/02/2022

## 2022-11-02 NOTE — PROGRESS NOTES
2.5 liter plasma exchange complete by [facility] staff nurse CRISTINE Manning per blood bank protocol.  1 liter of 5% albumin and 1.5 liters of FFP used as replacement fluid.  See flowsheet on chart details.  Tolerated well.  Next tx 10/30/2022.

## 2022-11-02 NOTE — SUBJECTIVE & OBJECTIVE
Interval History: Feeling sleepy. Received PLEX #5 yesterday and HD. Undergoing PLEX # 6 today. Rituximab tomorrow    Current Facility-Administered Medications   Medication Frequency    0.9%  NaCl infusion (for blood administration) Q24H PRN    0.9%  NaCl infusion (for blood administration) Q24H PRN    acetaminophen tablet 650 mg Q4H PRN    albumin human 5% bottle 50 g Once    [START ON 11/3/2022] albumin human 5% bottle 50 g Once    albuterol-ipratropium 2.5 mg-0.5 mg/3 mL nebulizer solution 3 mL Q4H PRN    aluminum-magnesium hydroxide-simethicone 200-200-20 mg/5 mL suspension 30 mL QID PRN    amLODIPine tablet 10 mg Daily    atovaquone 750 mg/5 mL oral liquid 1,500 mg Daily    bisacodyL suppository 10 mg Daily PRN    calcium gluconate 1 g in NS IVPB (premixed) Once    calcium gluconate 1 g in NS IVPB (premixed) Once    [START ON 11/3/2022] calcium gluconate 1 g in sodium chloride 0.9% 100 mL IVPB Once    carvediloL tablet 12.5 mg BID    cloNIDine tablet 0.2 mg Q4H PRN    dextrose 10% bolus 125 mL PRN    dextrose 10% bolus 250 mL PRN    [START ON 11/3/2022] diphenhydrAMINE injection 50 mg Once    glucagon (human recombinant) injection 1 mg PRN    glucose chewable tablet 16 g PRN    glucose chewable tablet 24 g PRN    [START ON 11/3/2022] heparin (porcine) injection 3,200 Units Once    hydrALAZINE injection 10 mg Q8H PRN    insulin aspart U-100 pen 1-10 Units Q6H PRN    levothyroxine tablet 25 mcg Before breakfast    lisinopriL tablet 40 mg Daily    melatonin tablet 6 mg Nightly PRN    methocarbamoL tablet 500 mg TID PRN    methylPREDNISolone sodium succinate injection 24 mg Daily    naloxone 0.4 mg/mL injection 0.02 mg PRN    ondansetron disintegrating tablet 8 mg Q8H PRN    pantoprazole suspension 40 mg Daily    prochlorperazine injection Soln 5 mg Q6H PRN    QUEtiapine tablet 25 mg QHS    simethicone chewable tablet 80 mg QID PRN    sodium chloride 0.9% 250 mL flush bag 1 time in [facility]    sodium chloride  0.9% flush 10 mL PRN    sodium chloride 0.9% flush 5 mL PRN     Facility-Administered Medications Ordered in Other Encounters   Medication Frequency    celecoxib capsule 400 mg Once    fentaNYL 50 mcg/mL injection  mcg PRN    LIDOcaine (PF) 10 mg/ml (1%) injection 10 mg Once PRN    LIDOcaine (PF) 10 mg/ml (1%) injection 10 mg Once    midazolam (VERSED) 1 mg/mL injection 0.5-4 mg PRN    ropivacaine 0.2% [facility] PainPRO Pump infusion 500 ML Continuous     Objective:     Vital Signs (Most Recent):  Temp: 97.7 °F (36.5 °C) (11/02/22 1159)  Pulse: 84 (11/02/22 1336)  Resp: 14 (11/02/22 1159)  BP: (!) 169/74 (11/02/22 1159)  SpO2: 97 % (11/02/22 1336)  O2 Device (Oxygen Therapy): nasal cannula (11/02/22 1159)   Vital Signs (24h Range):  Temp:  [97.7 °F (36.5 °C)-98.8 °F (37.1 °C)] 97.7 °F (36.5 °C)  Pulse:  [71-88] 84  Resp:  [14-28] 14  SpO2:  [96 %-100 %] 97 %  BP: (138-189)/(63-84) 169/74     Weight: 63 kg (139 lb) (11/01/22 1245)  Body mass index is 26.26 kg/m².  Body surface area is 1.65 meters squared.      Intake/Output Summary (Last 24 hours) at 11/2/2022 1433  Last data filed at 11/2/2022 1400  Gross per 24 hour   Intake 2555.17 ml   Output 475 ml   Net 2080.17 ml       Physical Exam   Constitutional: No distress.   HENT:   Head: Normocephalic and atraumatic.   Nose: No rhinorrhea or nasal congestion.   Mouth/Throat: Oropharynx is clear.   No tenderness to palpation of sinuses. NGT in place   Ears: No tenderness to palpation     Eyes: Pupils are equal, round, and reactive to light.   Cardiovascular: Normal rate and regular rhythm.   No murmur heard.  Pulmonary/Chest: Effort normal and breath sounds normal. No respiratory distress. She has no wheezes.   Abdominal: Soft. Bowel sounds are normal. There is no abdominal tenderness.   Musculoskeletal:         General: No deformity. Normal range of motion.      Cervical back: Normal range of motion.   Neurological: She is alert.   Skin: Skin is warm and dry.    Psychiatric: Affect and judgment normal.     Significant Labs:  All pertinent lab results from the last 24 hours have been reviewed.    Significant Imaging:  Imaging results within the past 24 hours have been reviewed.

## 2022-11-02 NOTE — AI DETERIORATION ALERT
"RAPID RESPONSE NURSE AI ALERT       AI alert received.    Chart Reviewed: 11/02/2022, 2:09 PM    MRN: [id number]  Bed: 18155/34894 A    Dx: Microscopic polyangiitis    Kristin Goodman has a past medical history of Acute blood loss anemia, Acute hypoxemic respiratory failure, Allergy, Back pain, Chronic diastolic heart failure, Chronic diastolic heart failure, Colon polyp, Disorder of kidney and ureter, H/O Bell's palsy, Helicobacter pylori (H. pylori), HTN (hypertension), Hypothyroid, OA (osteoarthritis), DONAVAN (obstructive sleep apnea), Pneumonia due to other staphylococcus, Pulmonary HTN, Sepsis due to pneumonia, Septic shock, Trouble in sleeping, and Urinary incontinence.    Last VS: BP (!) 169/74 (BP Location: Right arm, Patient Position: Lying)   Pulse 84   Temp 97.7 °F (36.5 °C)   Resp 14   Ht 5' 1" (1.549 m)   Wt 63 kg (139 lb)   SpO2 97%   BMI 26.26 kg/m²     24H Vital Sign Range:  Temp:  [97.7 °F (36.5 °C)-98.8 °F (37.1 °C)]   Pulse:  [71-88]   Resp:  [14-28]   BP: (133-189)/(60-84)   SpO2:  [96 %-100 %]     Level of Consciousness (AVPU): alert    Recent Labs     10/31/22  0224 11/01/22  0412 11/02/22  0554   WBC 31.24* 27.35* 21.91*   HGB 9.6* 8.0* 8.1*   HCT 28.8* 25.2* 24.2*    244 193       Recent Labs     10/31/22  0224 11/01/22  0412 11/02/22  0554   *  149* 150* 145   K 3.5  3.5 3.4* 3.7     108 110 105   CO2 27  27 27 28   CREATININE 4.2*  4.2* 4.7* 4.6*   *  116* 160* 127*   PHOS 5.5*  5.4* 5.4* 5.3*   MG 2.1  2.1 2.0 1.9        No results for input(s): PH, PCO2, PO2, HCO3, POCSATURATED, BE in the last 72 hours.     OXYGEN:  Flow (L/min): 1  Oxygen Concentration (%): 50  O2 Device (Oxygen Therapy): nasal cannula    MEWS score: 0    Bedside RN, [place]  contacted. No concerns verbalized at this time. Instructed to call [phone] for further concerns or assistance.    Padma Herman RN        "

## 2022-11-02 NOTE — ASSESSMENT & PLAN NOTE
Patient is a 76 yo F with chronic diastolic heart failure, HTN, OA, who is admitted for respiratory failure. Rheumatology is consulted due to concern for vasculitis. Patient presented with cough and hemoptysis since 9/24/22. She was admitted due to dyspnea and hypoxia on 10/17. She has had Hb drop to 6 this admission requiring blood transfusions. Reviewed her chest imaging which shows progressive disease from 10/14 until now which can be concerning for DAH. This might also explain the Hb drop. No bronch planned for now due to her tenuous respiratory status. GI defers invasive workup for now because she is not stable enough. She has had increasing creatinine from baseline of 1 to 3.0 on 10/23/22. Nephrology concerned for ATN nephritic picture in urine sediment. Pending kidney biopsy result.    UA: 1+ blood, 88 RBCs, 18 WBCs  UPCR 1.54  RF and CCP negative  MITUL+ 1:2560 homogenous, +dsDNA, complements normal  PR3 slightly elevated at 1.2 (reference positive is 1.0)  MPO elevated at 132  C-ANCA+ 1:80  P-ANCA-  GBM antibodies negative  Quantiferon indeterminate  T-Spot Negative    Pending: cryoglobulins    Treating empirically for ANCA vasculitis while awaiting kidney biopsy results:  - s/p IV Solumedrol 1000 mg x3 doses. Now following PEXIVAS steroid taper. Currently on IV Solumedrol 24 mg daily.  - PLEX 10/26, 10/27, 10/29. Next scheduled for 10/30. Total 7 treatments planned.  - Got SLED 10/27. Bedside HD 10/30  - Rituximab 375 mg/m^2 (600 mg) on 10/27 (every 7 day dose 1 of 4)  - Cyclophosphamide 7.5 mg/kg on 10/27 (every 14 day dose 1 of 2)    Recommendations:  1. Continue steroid taper per PEXIVAS trial as in the chart below. Currently IV Solu-Medrol 24 mg daily (prednisone 30 mg daily equivalent). Started on 10/30  2. Atovaquone 1500 mg and Protonix daily while on high dose steroids.  3. Next Rituximab 275 mg/m^2 due on November 3  4. Next cyclophosphamide 7.5 mg/kg due on November 10  5. Monitor CBC and CMP in  10-14 days after giving chemotherapy  6. Follow up kidney biopsy

## 2022-11-02 NOTE — PLAN OF CARE
Problem: Occupational Therapy  Goal: Occupational Therapy Goal  Description: Goals to be met by: 11/15     Patient will increase functional independence with ADLs by performing:    UE Dressing with Modified [place].  LE Dressing with Modified [place].  Grooming while standing with Modified [place].  Toileting from toilet with Modified [place] for hygiene and clothing management.   Supine to sit with Modified [place].  Step transfer with Modified [place]  Toilet transfer to toilet with Modified [place].    Outcome: Ongoing, Progressing     Goals remain appropriate

## 2022-11-02 NOTE — PLAN OF CARE
Problem: Adult Inpatient Plan of Care  Goal: Plan of Care Review  Outcome: Ongoing, Progressing  Goal: Patient-Specific Goal (Individualized)  Outcome: Ongoing, Progressing  Goal: Absence of Hospital-Acquired Illness or Injury  Outcome: Ongoing, Progressing  Goal: Optimal Comfort and Wellbeing  Outcome: Ongoing, Progressing  Goal: Readiness for Transition of Care  Outcome: Ongoing, Progressing     Problem: Infection  Goal: Absence of Infection Signs and Symptoms  Outcome: Ongoing, Progressing     Problem: Adjustment to Illness (Sepsis/Septic Shock)  Goal: Optimal Coping  Outcome: Ongoing, Progressing     Problem: Bleeding (Sepsis/Septic Shock)  Goal: Absence of Bleeding  Outcome: Ongoing, Progressing     Problem: Glycemic Control Impaired (Sepsis/Septic Shock)  Goal: Blood Glucose Level Within Desired Range  Outcome: Ongoing, Progressing     Problem: Infection Progression (Sepsis/Septic Shock)  Goal: Absence of Infection Signs and Symptoms  Outcome: Ongoing, Progressing     Problem: Nutrition Impaired (Sepsis/Septic Shock)  Goal: Optimal Nutrition Intake  Outcome: Ongoing, Progressing     Problem: Fluid and Electrolyte Imbalance (Acute Kidney Injury/Impairment)  Goal: Fluid and Electrolyte Balance  Outcome: Ongoing, Progressing     Problem: Oral Intake Inadequate (Acute Kidney Injury/Impairment)  Goal: Optimal Nutrition Intake  Outcome: Ongoing, Progressing     Problem: Renal Function Impairment (Acute Kidney Injury/Impairment)  Goal: Effective Renal Function  Outcome: Ongoing, Progressing     Problem: Fluid Imbalance (Pneumonia)  Goal: Fluid Balance  Outcome: Ongoing, Progressing     Problem: Infection (Pneumonia)  Goal: Resolution of Infection Signs and Symptoms  Outcome: Ongoing, Progressing     Problem: Respiratory Compromise (Pneumonia)  Goal: Effective Oxygenation and Ventilation  Outcome: Ongoing, Progressing     Problem: Fall Injury Risk  Goal: Absence of Fall and Fall-Related Injury  Outcome: Ongoing,  Progressing     Problem: Skin Injury Risk Increased  Goal: Skin Health and Integrity  Outcome: Ongoing, Progressing     Problem: Device-Related Complication Risk (CRRT (Continuous Renal Replacement Therapy))  Goal: Safe, Effective Therapy Delivery  Outcome: Ongoing, Progressing     Problem: Hypothermia (CRRT (Continuous Renal Replacement Therapy))  Goal: Body Temperature Maintained in Desired Range  Outcome: Ongoing, Progressing     Problem: Infection (CRRT (Continuous Renal Replacement Therapy))  Goal: Absence of Infection Signs and Symptoms  Outcome: Ongoing, Progressing     Problem: Device-Related Complication Risk (Hemodialysis)  Goal: Safe, Effective Therapy Delivery  Outcome: Ongoing, Progressing     Problem: Hemodynamic Instability (Hemodialysis)  Goal: Effective Tissue Perfusion  Outcome: Ongoing, Progressing     Problem: Infection (Hemodialysis)  Goal: Absence of Infection Signs and Symptoms  Outcome: Ongoing, Progressing

## 2022-11-02 NOTE — ASSESSMENT & PLAN NOTE
As of 10/26/22 strongly positive MPO Ab (in contrast to borderline positive PR3), consistent with clinical picture of microscopic polyangiitis with pulmonary, and renal involvement. Trialysis catheter placed overnight with plans for plasmapheresis early this AM. Pt tolerated procedure well despite multiple attempts to place line. Went for IR Renal biopsy this AM. Tolerated procedure well. Endorsing burning at szymanski insertion site. Continuing Azithromycin and Cefepime. Rheumatology planning to start rituximab and cyclophosphamide.      - Nephrology consulted  - Transfusion medicine consulted  - Rheumatology consulted  - Continue Plasmapheresis w/ plans for 7 treatments over 14-day period  -- Plan for 11/1, 11/2, 11/3  - Rituximab and cyclophosphamide initiated 10/28.   -- Next Rituximab 275 mg/m^2 due on November 3  -- Next cyclophosphamide 7.5 mg/kg due on November 10          - s/p 7 days of high dose steroids, tapering per rheumatology's recs  -- IV solumedrol 24mg daily x7 days  - Protonix daily  - PJP prophylaxis with atovaquone via NG

## 2022-11-02 NOTE — PT/OT/SLP PROGRESS
Occupational Therapy   Treatment    Name: Kristin Goodman  MRN: [id number]  Admitting Diagnosis:  Microscopic polyangiitis       Recommendations:     Discharge Recommendations: nursing facility, skilled  Discharge Equipment Recommendations:  bedside commode  Barriers to discharge:  None    Assessment:     Kristin Goodman is a 77 y.o. female with a medical diagnosis of Microscopic polyangiitis.  She presents with fair participation and motivation.  Pt limited primarily by global weakness & continuous lightheadedness while seated EOB. Pt continues to be at risk for falls. Performance deficits affecting function are weakness, impaired endurance, impaired self care skills, impaired balance, impaired functional mobility, decreased upper extremity function, decreased lower extremity function, decreased safety awareness, impaired cardiopulmonary response to activity.     Rehab Prognosis:  Fair; patient would benefit from acute skilled OT services to address these deficits and reach maximum level of function.       Plan:     Patient to be seen 3 x/week to address the above listed problems via self-care/home management, therapeutic activities, therapeutic exercises, neuromuscular re-education  Plan of Care Expires: 11/20/22  Plan of Care Reviewed with: patient, sibling    Subjective   Pt reported that she was lightheaded while seated EOB.  Pain/Comfort:  Pain Rating 1: 0/10  Pain Rating Post-Intervention 1: 0/10    Objective:     Communicated with: RN prior to session.  Patient found supine with telemetry, NG tube, bowel management system, pulse ox (continuous), oxygen, central line (sister present during session) upon OT entry to room.    General Precautions: Standard, fall, aspiration, NPO   Orthopedic Precautions:N/A   Braces: N/A     Occupational Performance:     Bed Mobility:    Patient completed Rolling/Turning to Left with  maximal assistance  Patient completed Rolling/Turning to Right with total assistance  Patient  completed Scooting/Bridging with total assistance with scooting forward on EOB, dependent drawsheet transfer up HOB while supine  Patient completed Supine to Sit with maximal assistance  Patient completed Sit to Supine with maximal assistance     [facility] 6 Click ADL: 9    Treatment & Education:  Pt required Min-Max (A) for postural control while seated EOB due to lateral & posterior leaning.  Provided verbal & physical cues to facilitate postural control. Provided verbal & physical cues to decrease pushing with LUE while seated EOB. Provided cues for deep breathing & cervical extension while seated EOB.  Provided education regarding need for eyes open to facilitate decreased dizziness.  Provided education on elevated HOB to facilitate more of a chair position to assist with body adjustment for upright sitting as well as therex for BUE to facilitate increased endurance & ROM.  Pt had no further questions & when asked whether there were any concerns pt reported none.          Patient left supine with all lines intact, call button in reach, bed alarm on, RN notified, and sister present    GOALS:   Multidisciplinary Problems       Occupational Therapy Goals          Problem: Occupational Therapy    Goal Priority Disciplines Outcome Interventions   Occupational Therapy Goal     OT, PT/OT Ongoing, Progressing    Description: Goals to be met by: 11/15     Patient will increase functional independence with ADLs by performing:    UE Dressing with Modified [place].  LE Dressing with Modified [place].  Grooming while standing with Modified [place].  Toileting from toilet with Modified [place] for hygiene and clothing management.   Supine to sit with Modified [place].  Step transfer with Modified [place]  Toilet transfer to toilet with Modified [place].                         Time Tracking:     OT Date of Treatment: 11/02/22  OT Start Time: 1216  OT Stop Time: 1240  OT Total Time (min): 24  min    Billable Minutes:Therapeutic Activity 24    OT/SARAHI: OT     SARAHI Visit Number: 0    11/2/2022

## 2022-11-03 LAB
ALBUMIN SERPL BCP-MCNC: 3 G/DL (ref 3.5–5.2)
ALP SERPL-CCNC: 73 U/L (ref 55–135)
ALT SERPL W/O P-5'-P-CCNC: 29 U/L (ref 10–44)
ANION GAP SERPL CALC-SCNC: 15 MMOL/L (ref 8–16)
AST SERPL-CCNC: 29 U/L (ref 10–40)
BASOPHILS # BLD AUTO: 0.03 K/UL (ref 0–0.2)
BASOPHILS NFR BLD: 0.2 % (ref 0–1.9)
BILIRUB SERPL-MCNC: 0.6 MG/DL (ref 0.1–1)
BLD PROD TYP BPU: NORMAL
BLOOD UNIT EXPIRATION DATE: NORMAL
BLOOD UNIT TYPE CODE: 1700
BLOOD UNIT TYPE CODE: 1700
BLOOD UNIT TYPE CODE: 7300
BLOOD UNIT TYPE: NORMAL
BUN SERPL-MCNC: 147 MG/DL (ref 8–23)
CALCIUM SERPL-MCNC: 8.3 MG/DL (ref 8.7–10.5)
CHLORIDE SERPL-SCNC: 105 MMOL/L (ref 95–110)
CO2 SERPL-SCNC: 27 MMOL/L (ref 23–29)
CODING SYSTEM: NORMAL
CREAT SERPL-MCNC: 4.1 MG/DL (ref 0.5–1.4)
DIFFERENTIAL METHOD: ABNORMAL
DISPENSE STATUS: NORMAL
EOSINOPHIL # BLD AUTO: 0.2 K/UL (ref 0–0.5)
EOSINOPHIL NFR BLD: 1.5 % (ref 0–8)
ERYTHROCYTE [DISTWIDTH] IN BLOOD BY AUTOMATED COUNT: 15.3 % (ref 11.5–14.5)
EST. GFR  (NO RACE VARIABLE): 10.8 ML/MIN/1.73 M^2
GALACTOMANNAN AG SERPL IA-ACNC: NORMAL
GLUCOSE SERPL-MCNC: 126 MG/DL (ref 70–110)
HCT VFR BLD AUTO: 22.8 % (ref 37–48.5)
HGB BLD-MCNC: 7.5 G/DL (ref 12–16)
IMM GRANULOCYTES # BLD AUTO: 0.19 K/UL (ref 0–0.04)
IMM GRANULOCYTES NFR BLD AUTO: 1.2 % (ref 0–0.5)
LYMPHOCYTES # BLD AUTO: 0.8 K/UL (ref 1–4.8)
LYMPHOCYTES NFR BLD: 5.1 % (ref 18–48)
MAGNESIUM SERPL-MCNC: 1.7 MG/DL (ref 1.6–2.6)
MCH RBC QN AUTO: 28.5 PG (ref 27–31)
MCHC RBC AUTO-ENTMCNC: 32.9 G/DL (ref 32–36)
MCV RBC AUTO: 87 FL (ref 82–98)
MONOCYTES # BLD AUTO: 1 K/UL (ref 0.3–1)
MONOCYTES NFR BLD: 6.6 % (ref 4–15)
NEUTROPHILS # BLD AUTO: 13 K/UL (ref 1.8–7.7)
NEUTROPHILS NFR BLD: 85.4 % (ref 38–73)
NRBC BLD-RTO: 0 /100 WBC
NUM UNITS TRANS FFP: NORMAL
PHOSPHATE SERPL-MCNC: 5.2 MG/DL (ref 2.7–4.5)
PLATELET # BLD AUTO: 140 K/UL (ref 150–450)
PMV BLD AUTO: 13.5 FL (ref 9.2–12.9)
POCT GLUCOSE: 133 MG/DL (ref 70–110)
POCT GLUCOSE: 154 MG/DL (ref 70–110)
POCT GLUCOSE: 157 MG/DL (ref 70–110)
POTASSIUM SERPL-SCNC: 3.3 MMOL/L (ref 3.5–5.1)
PROT SERPL-MCNC: 4.8 G/DL (ref 6–8.4)
RBC # BLD AUTO: 2.63 M/UL (ref 4–5.4)
SODIUM SERPL-SCNC: 147 MMOL/L (ref 136–145)
STRONGYLOIDES ANTIBODY IGG: NEGATIVE
WBC # BLD AUTO: 15.23 K/UL (ref 3.9–12.7)

## 2022-11-03 PROCEDURE — 99233 SBSQ HOSP IP/OBS HIGH 50: CPT | Mod: ,,, | Performed by: INTERNAL MEDICINE

## 2022-11-03 PROCEDURE — 99231 PR SUBSEQUENT HOSPITAL CARE,LEVL I: ICD-10-PCS | Mod: GC,,, | Performed by: INTERNAL MEDICINE

## 2022-11-03 PROCEDURE — 36514 PR THER APHERESIS,PLASMA PHERESIS: ICD-10-PCS | Mod: ,,, | Performed by: PATHOLOGY

## 2022-11-03 PROCEDURE — 92611 MOTION FLUOROSCOPY/SWALLOW: CPT

## 2022-11-03 PROCEDURE — 99900035 HC TECH TIME PER 15 MIN (STAT)

## 2022-11-03 PROCEDURE — 36415 COLL VENOUS BLD VENIPUNCTURE: CPT

## 2022-11-03 PROCEDURE — 25000003 PHARM REV CODE 250: Performed by: STUDENT IN AN ORGANIZED HEALTH CARE EDUCATION/TRAINING PROGRAM

## 2022-11-03 PROCEDURE — 85025 COMPLETE CBC W/AUTO DIFF WBC: CPT

## 2022-11-03 PROCEDURE — 36514 APHERESIS PLASMA: CPT

## 2022-11-03 PROCEDURE — 27000221 HC OXYGEN, UP TO 24 HOURS

## 2022-11-03 PROCEDURE — P9045 ALBUMIN (HUMAN), 5%, 250 ML: HCPCS | Mod: JG | Performed by: PATHOLOGY

## 2022-11-03 PROCEDURE — 80053 COMPREHEN METABOLIC PANEL: CPT

## 2022-11-03 PROCEDURE — 94660 CPAP INITIATION&MGMT: CPT

## 2022-11-03 PROCEDURE — 99231 SBSQ HOSP IP/OBS SF/LOW 25: CPT | Mod: GC,,, | Performed by: INTERNAL MEDICINE

## 2022-11-03 PROCEDURE — 63600175 PHARM REV CODE 636 W HCPCS: Performed by: STUDENT IN AN ORGANIZED HEALTH CARE EDUCATION/TRAINING PROGRAM

## 2022-11-03 PROCEDURE — 25500020 PHARM REV CODE 255: Performed by: INTERNAL MEDICINE

## 2022-11-03 PROCEDURE — 25000003 PHARM REV CODE 250: Performed by: INTERNAL MEDICINE

## 2022-11-03 PROCEDURE — 36514 APHERESIS PLASMA: CPT | Mod: ,,, | Performed by: PATHOLOGY

## 2022-11-03 PROCEDURE — 63600175 PHARM REV CODE 636 W HCPCS: Mod: JG | Performed by: PATHOLOGY

## 2022-11-03 PROCEDURE — 25000003 PHARM REV CODE 250: Performed by: PATHOLOGY

## 2022-11-03 PROCEDURE — 25000003 PHARM REV CODE 250

## 2022-11-03 PROCEDURE — 63600175 PHARM REV CODE 636 W HCPCS: Performed by: INTERNAL MEDICINE

## 2022-11-03 PROCEDURE — 83735 ASSAY OF MAGNESIUM: CPT

## 2022-11-03 PROCEDURE — 99233 PR SUBSEQUENT HOSPITAL CARE,LEVL III: ICD-10-PCS | Mod: ,,, | Performed by: INTERNAL MEDICINE

## 2022-11-03 PROCEDURE — P9017 PLASMA 1 DONOR FRZ W/IN 8 HR: HCPCS | Performed by: PATHOLOGY

## 2022-11-03 PROCEDURE — 20600001 HC STEP DOWN PRIVATE ROOM

## 2022-11-03 PROCEDURE — A9698 NON-RAD CONTRAST MATERIALNOC: HCPCS | Performed by: INTERNAL MEDICINE

## 2022-11-03 PROCEDURE — 84100 ASSAY OF PHOSPHORUS: CPT

## 2022-11-03 PROCEDURE — 94761 N-INVAS EAR/PLS OXIMETRY MLT: CPT

## 2022-11-03 PROCEDURE — 97535 SELF CARE MNGMENT TRAINING: CPT

## 2022-11-03 PROCEDURE — 27201109 HC SYSTEM FECAL MANAGEMENT

## 2022-11-03 RX ORDER — METHYLPREDNISOLONE SOD SUCC 125 MG
100 VIAL (EA) INJECTION DAILY
Status: DISCONTINUED | OUTPATIENT
Start: 2022-11-03 | End: 2022-11-07

## 2022-11-03 RX ORDER — METHYLPREDNISOLONE SOD SUCC 125 MG
100 VIAL (EA) INJECTION DAILY
Status: CANCELLED
Start: 2022-11-10

## 2022-11-03 RX ORDER — ACETAMINOPHEN 325 MG/1
650 TABLET ORAL
Status: COMPLETED | OUTPATIENT
Start: 2022-11-03 | End: 2022-11-03

## 2022-11-03 RX ORDER — SODIUM CHLORIDE 0.9 % (FLUSH) 0.9 %
10 SYRINGE (ML) INJECTION
Status: DISCONTINUED | OUTPATIENT
Start: 2022-11-03 | End: 2022-12-13 | Stop reason: HOSPADM

## 2022-11-03 RX ORDER — MEPERIDINE HYDROCHLORIDE 50 MG/ML
25 INJECTION INTRAMUSCULAR; INTRAVENOUS; SUBCUTANEOUS
Status: DISPENSED | OUTPATIENT
Start: 2022-11-03 | End: 2022-11-04

## 2022-11-03 RX ORDER — FAMOTIDINE 10 MG/ML
20 INJECTION INTRAVENOUS
Status: COMPLETED | OUTPATIENT
Start: 2022-11-03 | End: 2022-11-03

## 2022-11-03 RX ORDER — POTASSIUM CHLORIDE 750 MG/1
30 CAPSULE, EXTENDED RELEASE ORAL 3 TIMES DAILY
Status: DISCONTINUED | OUTPATIENT
Start: 2022-11-03 | End: 2022-11-03

## 2022-11-03 RX ADMIN — AMLODIPINE BESYLATE 10 MG: 10 TABLET ORAL at 11:11

## 2022-11-03 RX ADMIN — SODIUM CHLORIDE: 0.9 INJECTION, SOLUTION INTRAVENOUS at 04:11

## 2022-11-03 RX ADMIN — CARVEDILOL 12.5 MG: 12.5 TABLET, FILM COATED ORAL at 11:11

## 2022-11-03 RX ADMIN — DIPHENHYDRAMINE HYDROCHLORIDE 25 MG: 50 INJECTION INTRAMUSCULAR; INTRAVENOUS at 02:11

## 2022-11-03 RX ADMIN — DIPHENHYDRAMINE HYDROCHLORIDE 50 MG: 50 INJECTION, SOLUTION INTRAMUSCULAR; INTRAVENOUS at 10:11

## 2022-11-03 RX ADMIN — ALBUMIN (HUMAN) 50 G: 12.5 SOLUTION INTRAVENOUS at 10:11

## 2022-11-03 RX ADMIN — HYDRALAZINE HYDROCHLORIDE 25 MG: 25 TABLET, FILM COATED ORAL at 02:11

## 2022-11-03 RX ADMIN — QUETIAPINE FUMARATE 25 MG: 25 TABLET ORAL at 08:11

## 2022-11-03 RX ADMIN — HEPARIN SODIUM 3200 UNITS: 1000 INJECTION, SOLUTION INTRAVENOUS; SUBCUTANEOUS at 10:11

## 2022-11-03 RX ADMIN — POTASSIUM BICARBONATE 50 MEQ: 977.5 TABLET, EFFERVESCENT ORAL at 02:11

## 2022-11-03 RX ADMIN — FAMOTIDINE 20 MG: 10 INJECTION INTRAVENOUS at 02:11

## 2022-11-03 RX ADMIN — RITUXIMAB 600 MG: 10 INJECTION, SOLUTION INTRAVENOUS at 04:11

## 2022-11-03 RX ADMIN — BARIUM SULFATE 3 ML: 0.81 POWDER, FOR SUSPENSION ORAL at 01:11

## 2022-11-03 RX ADMIN — ACETAMINOPHEN 650 MG: 325 TABLET ORAL at 02:11

## 2022-11-03 RX ADMIN — HYDRALAZINE HYDROCHLORIDE 25 MG: 25 TABLET, FILM COATED ORAL at 10:11

## 2022-11-03 RX ADMIN — CARVEDILOL 12.5 MG: 12.5 TABLET, FILM COATED ORAL at 08:11

## 2022-11-03 RX ADMIN — METHYLPREDNISOLONE SODIUM SUCCINATE 100 MG: 125 INJECTION, POWDER, FOR SOLUTION INTRAMUSCULAR; INTRAVENOUS at 02:11

## 2022-11-03 RX ADMIN — HYDRALAZINE HYDROCHLORIDE 25 MG: 25 TABLET, FILM COATED ORAL at 05:11

## 2022-11-03 RX ADMIN — PANTOPRAZOLE SODIUM 40 MG: 40 GRANULE, DELAYED RELEASE ORAL at 11:11

## 2022-11-03 RX ADMIN — METHYLPREDNISOLONE SODIUM SUCCINATE 24 MG: 40 INJECTION, POWDER, FOR SOLUTION INTRAMUSCULAR; INTRAVENOUS at 11:11

## 2022-11-03 RX ADMIN — INSULIN ASPART 2 UNITS: 100 INJECTION, SOLUTION INTRAVENOUS; SUBCUTANEOUS at 05:11

## 2022-11-03 RX ADMIN — LEVOTHYROXINE SODIUM 25 MCG: 25 TABLET ORAL at 05:11

## 2022-11-03 RX ADMIN — LISINOPRIL 40 MG: 20 TABLET ORAL at 11:11

## 2022-11-03 RX ADMIN — CALCIUM GLUCONATE 1 G: 98 INJECTION, SOLUTION INTRAVENOUS at 10:11

## 2022-11-03 RX ADMIN — ATOVAQUONE 1500 MG: 750 SUSPENSION ORAL at 11:11

## 2022-11-03 NOTE — PROGRESS NOTES
"[facility] ([place] [address])  Rheumatology  Progress Note    Patient Name: Kristin Goodman  MRN: [id number]  Admission Date: 10/17/2022  Hospital Length of Stay: 17 days  Code Status: Full Code   Attending Provider: Immanuel Peres MD  Primary Care Physician: Lori Hernandez MD  Principal Problem: Microscopic polyangiitis    Subjective:     HPI: Patient is a 76 yo F with chronic diastolic heart failure, HTN, OA, who is admitted for respiratory failure. Rheumatology is consulted due to concern for vasculitis. Patient initially presented to the ED on 9/24/22 complaining of a week of cough followed by an episode of hemoptysis on 9/24 prompting the ED visit. They did a CTA which showed multifocal PNA. She was sent home with Levaquin. She followed up with PCP on 10/4/22 and was feeling better. Then she presented to the ED again on 10/14 and on 10/17 complaining of dyspnea. She had fevers in the few days leading up to admission of 102. She endorsed weakness, productive cough, dyspnea, and loss of appetite. Pt initally at 88% O2 saturation and improved to 98% on 2L NC. She was admitted for IV antibiotics. CT chest with contrast on 10/17 showed evidence of interval progression of bilateral perihilar dense consolidation with peripheral patchy areas of ground-glass opacification nonspecific as to the etiology.  Bilateral pneumonia as well as inflammatory etiologies considered.  Cardiogenic pulmonary edema considered less likely given the pattern.    On 10/19/22 she started having melena. Hb dropped to 6. She got 2 units pRBCs. GI was consulted. They noted "Colonoscopy in 2020 showed internal hemorrhoids and mild sigmoid diverticulosis. EGD in 2012 showed gastritis, biopsies positive for H. Pylori." "We considered doing EGD now, but from a pulmonary standpoint I do not think she is stable enough with the current pneumonia, therefore would recommend that we just follow the patient, treat with a PPI in case there " "is any peptic ulcer disease."    On 10/21/22 ENT was consulted due to left sided otalgia the day prior which resolved. She also was complaining of cervical adenopathy and sore throat. They did not recommend surgical intervention and felt that adenopathy was likely reactive.     On 10/23/22 ID was consulted. They recommended checking respiratory infection panel and fungal markers.    On 10/23/22 Nephrology was consulted due to worsening creatinine. They noted, "Patient is a noted to have baseline creatinine of 1 as recent as 8/2022 but progressive worsening of creatinine in early October.  On admission patient with creatinine of 1.6 (10/17) with subsequent progression and creatinine of 3.0 at time of consultation (10/23).  Nephrology consulted for acute kidney injury." "Patient with ATN nephritic picture in urine sediment, prot/crea ratio pending. Had hematuria in recent past but in context with positive urine cultures. Now definitely more dysmorphic red cells that wbcs in the urine. 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    On 10/23/22, she was transferred to ICU due to desaturations and respiratory distress. Pulmonologist noted that her respiratory status is too tenuous for bronchoscopy. She was stabilized with non-invasive ventilation and nasal cannula oxygen.      Interval History: completed PLEX this morning. Plan to give Rituximab today. Feels well    Current Facility-Administered Medications   Medication Frequency    0.9%  NaCl infusion (for blood administration) Q24H PRN    0.9%  NaCl infusion (for blood administration) Q24H PRN    acetaminophen tablet 650 mg Q4H PRN    acetaminophen tablet 650 mg 1 time in Clinic/HOD    albuterol-ipratropium 2.5 mg-0.5 mg/3 mL nebulizer solution 3 mL Q4H PRN    aluminum-magnesium hydroxide-simethicone 200-200-20 mg/5 mL suspension 30 mL QID PRN    " amLODIPine tablet 10 mg Daily    atovaquone 750 mg/5 mL oral liquid 1,500 mg Daily    bisacodyL suppository 10 mg Daily PRN    carvediloL tablet 12.5 mg BID    dextrose 10% bolus 125 mL PRN    dextrose 10% bolus 250 mL PRN    diphenhydrAMINE (BENADRYL) 25 mg in NS 50 mL IVPB 1 time in Clinic/HOD    famotidine (PF) injection 20 mg 1 time in Clinic/HOD    glucagon (human recombinant) injection 1 mg PRN    glucose chewable tablet 16 g PRN    glucose chewable tablet 24 g PRN    hydrALAZINE tablet 25 mg Q8H    hydrALAZINE tablet 50 mg Q8H PRN    insulin aspart U-100 pen 1-10 Units Q6H PRN    levothyroxine tablet 25 mcg Before breakfast    lisinopriL tablet 40 mg Daily    melatonin tablet 6 mg Nightly PRN    meperidine injection 25 mg PRN    methocarbamoL tablet 500 mg TID PRN    methylPREDNISolone sodium succinate injection 100 mg Daily    methylPREDNISolone sodium succinate injection 24 mg Daily    naloxone 0.4 mg/mL injection 0.02 mg PRN    ondansetron disintegrating tablet 8 mg Q8H PRN    pantoprazole suspension 40 mg Daily    potassium bicarbonate disintegrating tablet 50 mEq Once    prochlorperazine injection Soln 5 mg Q6H PRN    QUEtiapine tablet 25 mg QHS    riTUXimab (RITUXAN) 600 mg in sodium chloride 0.9% 600 mL infusion (conc: 1 mg/mL) 1 time in Clinic/HOD    simethicone chewable tablet 80 mg QID PRN    sodium chloride 0.9% 250 mL flush bag 1 time in Clinic/HOD    sodium chloride 0.9% 250 mL flush bag 1 time in Clinic/HOD    sodium chloride 0.9% flush 10 mL PRN    sodium chloride 0.9% flush 10 mL PRN    sodium chloride 0.9% flush 5 mL PRN     Facility-Administered Medications Ordered in Other Encounters   Medication Frequency    celecoxib capsule 400 mg Once    fentaNYL 50 mcg/mL injection  mcg PRN    LIDOcaine (PF) 10 mg/ml (1%) injection 10 mg Once PRN    LIDOcaine (PF) 10 mg/ml (1%) injection 10 mg Once    midazolam (VERSED) 1 mg/mL injection 0.5-4 mg PRN    ropivacaine 0.2% [facility] PainPRO Pump  infusion 500 ML Continuous     Objective:     Vital Signs (Most Recent):  Temp: 98.7 °F (37.1 °C) (11/03/22 1105)  Pulse: 81 (11/03/22 1220)  Resp: 18 (11/03/22 1220)  BP: (!) 152/66 (11/03/22 1220)  SpO2: (!) 91 % (11/03/22 1220)  O2 Device (Oxygen Therapy): nasal cannula (11/03/22 0130)   Vital Signs (24h Range):  Temp:  [97.6 °F (36.4 °C)-99 °F (37.2 °C)] 98.7 °F (37.1 °C)  Pulse:  [] 81  Resp:  [16-33] 18  SpO2:  [91 %-100 %] 91 %  BP: (132-170)/(60-77) 152/66     Weight: 63 kg (138 lb 14.2 oz) (11/03/22 1000)  Body mass index is 26.24 kg/m².  Body surface area is 1.65 meters squared.      Intake/Output Summary (Last 24 hours) at 11/3/2022 1355  Last data filed at 11/3/2022 1105  Gross per 24 hour   Intake 6260 ml   Output 6603 ml   Net -343 ml       Physical Exam   Constitutional: No distress.   HENT:   Head: Normocephalic and atraumatic.   Nose: No rhinorrhea or nasal congestion.   Mouth/Throat: Oropharynx is clear.   No tenderness to palpation of sinuses. NGT in place   Ears: No tenderness to palpation     Eyes: Pupils are equal, round, and reactive to light.   Cardiovascular: Normal rate and regular rhythm.   No murmur heard.  Pulmonary/Chest: Effort normal and breath sounds normal. No respiratory distress. She has no wheezes.   Abdominal: Soft. Bowel sounds are normal. There is no abdominal tenderness.   Musculoskeletal:         General: No deformity. Normal range of motion.      Cervical back: Normal range of motion.   Neurological: She is alert.   Skin: Skin is warm and dry.   Psychiatric: Affect and judgment normal.     Significant Labs:  All pertinent lab results from the last 24 hours have been reviewed.    Significant Imaging:  Imaging results within the past 24 hours have been reviewed.    Assessment/Plan:     Acute hypoxemic respiratory failure  Patient is a 76 yo F with chronic diastolic heart failure, HTN, OA, who is admitted for respiratory failure. Rheumatology is consulted due to concern  for vasculitis. Patient presented with cough and hemoptysis since 9/24/22. She was admitted due to dyspnea and hypoxia on 10/17. She has had Hb drop to 6 this admission requiring blood transfusions. Reviewed her chest imaging which shows progressive disease from 10/14 until now which can be concerning for DAH. This might also explain the Hb drop. No bronch planned for now due to her tenuous respiratory status. GI defers invasive workup for now because she is not stable enough. She has had increasing creatinine from baseline of 1 to 3.0 on 10/23/22. Nephrology concerned for ATN nephritic picture in urine sediment. Pending kidney biopsy result.    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   3)  CHANGES SUGGESTIVE OF FOCAL ACUTE PYELONEPHRITIS.   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     Treating empirically for ANCA vasculitis while awaiting kidney biopsy results:  - s/p IV Solumedrol 1000 mg x3 doses. Now following PEXIVAS steroid taper. Currently on IV Solumedrol 24 mg daily.  - PLEX 10/26, 10/27, 10/29. Next scheduled for 10/30. Total 7 treatments planned.  - Got SLED 10/27. Bedside HD 10/30  - Rituximab 375 mg/m^2 (600 mg) on 10/27 (every 7 day dose 1 of 4)  - Cyclophosphamide 7.5 mg/kg on 10/27 (every 14 day dose 1 of 2)    Recommendations:  Continue steroid taper per PEXIVAS trial as in the chart below. Currently IV Solu-Medrol 24 mg daily (prednisone 30 mg daily equivalent). Started on 10/30  Atovaquone  1500 mg and Protonix daily while on high dose steroids.  Next Rituximab 275 mg/m^2 due today  Next cyclophosphamide 7.5 mg/kg due on November 10  Monitor CBC and CMP in 10-14 days after giving chemotherapy  Follow up kidney biopsy                Thank you for this consult. These are preliminary recommendations. Please follow attending recommendations. Please message with any questions or concerns.       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 cause     Solu-Medrol 24mg IV daily x 1 wk per Pexivas tapering schedule  Rituximab 375mg IV next dose 11/3/22(this evening)   Cyclophosphamide 750mg/kg IV next dose 11/10/22  PLEX   # 7  completed today  ? avacopan  Atovaquone 1500 mg daily  Pantoprazole  *Will need Evusheld given rituximab  Speech Therapy and ENT f/u for severe dysphagia.     Jason Woods MD  Rheumatology  [phone]

## 2022-11-03 NOTE — CARE UPDATE
RAPID RESPONSE NURSE ROUND       Rounding completed with charge RN, Yasmin. No additional concerns verbalized at this time. Instructed to call [phone] for further concerns or assistance.

## 2022-11-03 NOTE — PROGRESS NOTES
Apheresis tx started at 0945 without difficulty. Pt AAOx4.  2.5 Liter exchange.  Replacement fluids 1.5 FFP and 1L Albumin, 50mg Benadryl, 2g Calcium. Tx ended at 1100. Catheter clamped, flushed, and Hep locked per protocol. Pt tolerated tx well, VSS, NAD. This is the last tx. Daughter is bedside.

## 2022-11-03 NOTE — PROCEDURES
[facility]  Transfusion Medicine  Procedure Note    SUMMARY   Therapeutic Plasma Exchange (Apheresis)    Date/Time: 11/3/2022 12:24 PM  Performed by: Francisco J Jackson MD  Authorized by: Francisco J Jackson MD       Date of Procedure: 11/3/2022     Procedure: Plasma Exchange    Provider: Francisco J Jackson MD     Assisting Provider: None    Pre-Procedure Diagnosis: Microscopic polyangiitis    Post-Procedure Diagnosis: Microscopic polyangiitis    Follow-up Assessment: Ms. Goodman is a  76 yo F with chronic diastolic heart failure, HTN and recent ED visit on 9/24 for PNA with complaints of cough x 1 week and hemoptysis x 1 on 9/24 who returned on 10/14 and 0/17 with complaints of dyspnea. She was subsequently admitted after her last ED visit for respiratory failure with 88% oxygen saturation. CT chest with contrast on 10/17 showed evidence of interval progression of bilateral perihilar dense consolidation with peripheral patchy areas of ground-glass opacification nonspecific as to the etiology.  Worsening pneumonia as well as inflammatory etiologies were considered. On admission patient's creatinine at 1.6 (10/17) with worsening progression (currently @ 5.7). 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 Thus far, patient has received pulse steroids with future plans for Rituximab and Cyclophosphamide. Transfusion Medicine consulted for apheresis support.    Vasculitis, ANCA-associated with DAH carries a Category I Grade 1A indication for therapeutic apheresis via the 2016 Journal of Clinical Apheresis Guidelines (Lewis J et al. Journal of Clinical Apheresis 2016; 31:149-162.)    Interval History:  Today's procedure was well tolerated and without complications. This completes her treatment series.    Pertinent Laboratory Data: Complete Blood Count:   Lab Results   Component Value Date    HGB 7.5 (L) 11/03/2022    HCT 22.8  (L) 11/03/2022     (L) 11/03/2022    WBC 15.23 (H) 11/03/2022     Comprehensive Metabolic Panel:   Lab Results   Component Value Date     (H) 11/03/2022    K 3.3 (L) 11/03/2022     11/03/2022    CO2 27 11/03/2022     (H) 11/03/2022     (H) 11/03/2022    CREATININE 4.1 (H) 11/03/2022    CALCIUM 8.3 (L) 11/03/2022    PROT 4.8 (L) 11/03/2022    ALBUMIN 3.0 (L) 11/03/2022    BILITOT 0.6 11/03/2022    ALKPHOS 73 11/03/2022    AST 29 11/03/2022    ALT 29 11/03/2022    ANIONGAP 15 11/03/2022    EGFRNONAA 44 (A) 04/18/2022       Pertinent Medications:     Current Facility-Administered Medications:     0.9%  NaCl infusion (for blood administration), , Intravenous, Q24H PRN, Madie Lancaster MD    0.9%  NaCl infusion (for blood administration), , Intravenous, Q24H PRN, Madie Lancaster MD    acetaminophen tablet 650 mg, 650 mg, Oral, Q4H PRN, Magui Cage MD, 650 mg at 11/01/22 1045    albuterol-ipratropium 2.5 mg-0.5 mg/3 mL nebulizer solution 3 mL, 3 mL, Nebulization, Q4H PRN, Kandace Smith PA-C, 3 mL at 11/02/22 0746    aluminum-magnesium hydroxide-simethicone 200-200-20 mg/5 mL suspension 30 mL, 30 mL, Oral, QID PRN, Kandace Smith PA-C    amLODIPine tablet 10 mg, 10 mg, Per NG tube, Daily, Magui Cage MD, 10 mg at 11/03/22 1119    atovaquone 750 mg/5 mL oral liquid 1,500 mg, 1,500 mg, Per NG tube, Daily, Magui Cage MD, 1,500 mg at 11/03/22 1120    bisacodyL suppository 10 mg, 10 mg, Rectal, Daily PRN, Kandace Smith PA-C    carvediloL tablet 12.5 mg, 12.5 mg, Per NG tube, BID, Ethan De Jesus MD, 12.5 mg at 11/03/22 1119    dextrose 10% bolus 125 mL, 12.5 g, Intravenous, PRN, Immanuel Peres MD    dextrose 10% bolus 250 mL, 25 g, Intravenous, PRN, Immanuel Peres MD    glucagon (human recombinant) injection 1 mg, 1 mg, Intramuscular, PRN, Magui Cage MD    glucose chewable tablet 16 g, 16 g, Oral, PRN, Kandace Smith PA-C     glucose chewable tablet 24 g, 24 g, Oral, PRN, Kandace Smith PA-C    hydrALAZINE tablet 25 mg, 25 mg, Per NG tube, Q8H, Ethan De Jesus MD, 25 mg at 11/03/22 0550    hydrALAZINE tablet 50 mg, 50 mg, Per NG tube, Q8H PRN, Ethan De Jesus MD    insulin aspart U-100 pen 1-10 Units, 1-10 Units, Subcutaneous, Q6H PRN, Magui Cage MD, 2 Units at 11/03/22 0551    levothyroxine tablet 25 mcg, 25 mcg, Per NG tube, Before breakfast, Magui Cage MD, 25 mcg at 11/03/22 0550    lisinopriL tablet 40 mg, 40 mg, Per NG tube, Daily, Magui Cage MD, 40 mg at 11/03/22 1119    melatonin tablet 6 mg, 6 mg, Oral, Nightly PRN, Kandace Smith PA-C, 6 mg at 10/31/22 2039    methocarbamoL tablet 500 mg, 500 mg, Oral, TID PRN, Kandace Smith PA-C, 500 mg at 10/30/22 0058    methylPREDNISolone sodium succinate injection 24 mg, 24 mg, Intravenous, Daily, Magui Cage MD, 24 mg at 11/03/22 1120    naloxone 0.4 mg/mL injection 0.02 mg, 0.02 mg, Intravenous, PRN, Kandace Smith PA-C    ondansetron disintegrating tablet 8 mg, 8 mg, Oral, Q8H PRN, Kandace Smith PA-C, 8 mg at 10/31/22 0606    pantoprazole suspension 40 mg, 40 mg, Per NG tube, Daily, Ethan De Jesus MD, 40 mg at 11/03/22 1120    potassium bicarbonate disintegrating tablet 50 mEq, 50 mEq, Per NG tube, Once, Immanuel Peres MD    prochlorperazine injection Soln 5 mg, 5 mg, Intravenous, Q6H PRN, Kandace Smith PA-C, 5 mg at 10/24/22 0001    QUEtiapine tablet 25 mg, 25 mg, Oral, QHS, Jonathon Dupree MD, 25 mg at 11/02/22 2022    simethicone chewable tablet 80 mg, 1 tablet, Oral, QID PRN, Kandace Smith PA-C, 80 mg at 10/22/22 1133    sodium chloride 0.9% 250 mL flush bag, , Intravenous, 1 time in Clinic/HOD, Woo Palacio MD, Held at 10/27/22 1700    sodium chloride 0.9% flush 10 mL, 10 mL, Intravenous, PRN, Woo Palacio MD    sodium chloride 0.9% flush 5 mL, 5 mL, Intravenous, PRN, Kandace Smith,  KAREN    Facility-Administered Medications Ordered in Other Encounters:     celecoxib capsule 400 mg, 400 mg, Oral, Once, Dieudonne Marshall MD    fentaNYL 50 mcg/mL injection  mcg,  mcg, Intravenous, PRN, Dieudonne Marshall MD, 100 mcg at 12/21/21 0919    LIDOcaine (PF) 10 mg/ml (1%) injection 10 mg, 1 mL, Intradermal, Once PRN, Dieudonne Marshall MD    LIDOcaine (PF) 10 mg/ml (1%) injection 10 mg, 1 mL, Intradermal, Once, Dieudonne Marshall MD    midazolam (VERSED) 1 mg/mL injection 0.5-4 mg, 0.5-4 mg, Intravenous, PRN, Dieudonne Marshall MD, 2 mg at 12/21/21 0919    ropivacaine 0.2% Nimbus PainPRO Pump infusion 500 ML, , Perineural, Continuous, Diuedonne Marshall MD, New Bag at 12/21/21 1153       Review of patient's allergies indicates:   Allergen Reactions    Ampicillin     Peaches [peach (prunus persica)] Other (See Comments)     Pt unable to state type of reaction. Information obtained from daughter who states she was informed of allergy from patient.    Penicillins      Other reaction(s): Hives    Sulfa (sulfonamide antibiotics) Rash and Hives       Anesthesia: None     Technical Procedures Used: Plasma Exchange: Volume exchanged - 2.5 Liters; Replacement fluid - Albumin/Fresh Frozen Plasma; Number of procedures 7; Date of next procedure NA.    Description of the Findings of the Procedure:     Please see Apheresis Nurse flowsheet for details.    The patient was evaluated and all clinical and laboratory data relevant to the treatment was reviewed, and a decision was made to proceed with the Apheresis procedure.    I was available to the clinical staff throughout the procedure.    Significant Surgical Tasks Conducted by the Assistant(s): Not applicable    Complications: None    Estimated Blood Loss (EBL): None    Implants: None     Specimens: None    Francisco J Jackson M.D., M.S., [facility]  Medical Director  Section of Transfusion Medicine & Blood Donor Services  Department of Pathology and  Laboratory Medicine  [facility]  [phone] (Blood Bank)  11/03/2022

## 2022-11-03 NOTE — PROGRESS NOTES
[facility] - [facility] ([place] [address])  Nephrology  Progress Note    Patient Name: Kristin Goodman  MRN: [id number]  Admission Date: 10/17/2022  Hospital Length of Stay: 17 days  Attending Provider: Immanuel Peres MD   Primary Care Physician: Lori Hernandez MD  Principal Problem:Microscopic polyangiitis    Subjective:     HPI: Kristin Goodman is a 77 year old female with hypertension, hypothyroidism, HFpEF who presents for cough, shortness of breath, and weakness.  Patient initially presented to ER on 09/24 with hemoptysis and imaging concerning for multilobar pneumonia at which time she was discharged on a 10 day course of levofloxacin.  Patient initially had some improvement but began having worsening symptoms on 10/14.  Patient describes multiple fevers and progressive shortness of breath.  She continues to have intermittent hemoptysis.  Patient with worsening leukocytosis and limited clinical improvement despite broad-spectrum antibiotics.  She remains on cefepime.  Patient is a noted to have baseline creatinine of 1 as recent as 8/2022 but progressive worsening of creatinine in early October.  On admission patient with creatinine of 1.6 (10/17) with subsequent progression and creatinine of 3.0 at time of consultation (10/23).  Nephrology consulted for acute kidney injury.      Interval History: NAEON. Patient reports feeling well today, more energy and better spirits. Still NPO as failed SLP swallow test. To receive Ritux/cytoxan and Plex today. Will likely defer HD until tomorrow.     Review of patient's allergies indicates:   Allergen Reactions    Ampicillin     Peaches [peach (prunus persica)] Other (See Comments)     Pt unable to state type of reaction. Information obtained from daughter who states she was informed of allergy from patient.    Penicillins      Other reaction(s): Hives    Sulfa (sulfonamide antibiotics) Rash and Hives     Current Facility-Administered Medications   Medication Frequency     0.9%  NaCl infusion (for blood administration) Q24H PRN    0.9%  NaCl infusion (for blood administration) Q24H PRN    acetaminophen tablet 650 mg Q4H PRN    albumin human 5% bottle 50 g Once    albuterol-ipratropium 2.5 mg-0.5 mg/3 mL nebulizer solution 3 mL Q4H PRN    aluminum-magnesium hydroxide-simethicone 200-200-20 mg/5 mL suspension 30 mL QID PRN    amLODIPine tablet 10 mg Daily    atovaquone 750 mg/5 mL oral liquid 1,500 mg Daily    bisacodyL suppository 10 mg Daily PRN    calcium gluconate 1 g in sodium chloride 0.9% 100 mL IVPB Once    carvediloL tablet 12.5 mg BID    dextrose 10% bolus 125 mL PRN    dextrose 10% bolus 250 mL PRN    diphenhydrAMINE injection 50 mg Once    glucagon (human recombinant) injection 1 mg PRN    glucose chewable tablet 16 g PRN    glucose chewable tablet 24 g PRN    heparin (porcine) injection 3,200 Units Once    hydrALAZINE tablet 25 mg Q8H    hydrALAZINE tablet 50 mg Q8H PRN    insulin aspart U-100 pen 1-10 Units Q6H PRN    levothyroxine tablet 25 mcg Before breakfast    lisinopriL tablet 40 mg Daily    melatonin tablet 6 mg Nightly PRN    methocarbamoL tablet 500 mg TID PRN    methylPREDNISolone sodium succinate injection 24 mg Daily    naloxone 0.4 mg/mL injection 0.02 mg PRN    ondansetron disintegrating tablet 8 mg Q8H PRN    pantoprazole suspension 40 mg Daily    prochlorperazine injection Soln 5 mg Q6H PRN    QUEtiapine tablet 25 mg QHS    simethicone chewable tablet 80 mg QID PRN    sodium chloride 0.9% 250 mL flush bag 1 time in Clinic/HOD    sodium chloride 0.9% flush 10 mL PRN    sodium chloride 0.9% flush 5 mL PRN     Facility-Administered Medications Ordered in Other Encounters   Medication Frequency    celecoxib capsule 400 mg Once    fentaNYL 50 mcg/mL injection  mcg PRN    LIDOcaine (PF) 10 mg/ml (1%) injection 10 mg Once PRN    LIDOcaine (PF) 10 mg/ml (1%) injection 10 mg Once    midazolam (VERSED) 1 mg/mL  injection 0.5-4 mg PRN    ropivacaine 0.2% Nimbus PainPRO Pump infusion 500 ML Continuous       Objective:     Vital Signs (Most Recent):  Temp: 98.7 °F (37.1 °C) (11/03/22 0725)  Pulse: 85 (11/03/22 0725)  Resp: 18 (11/03/22 0725)  BP: (!) 168/74 (11/03/22 0725)  SpO2: (!) 92 % (11/03/22 0725)  O2 Device (Oxygen Therapy): nasal cannula (11/03/22 0130)   Vital Signs (24h Range):  Temp:  [97.6 °F (36.4 °C)-98.7 °F (37.1 °C)] 98.7 °F (37.1 °C)  Pulse:  [] 85  Resp:  [14-33] 18  SpO2:  [92 %-100 %] 92 %  BP: (132-189)/(60-84) 168/74     Weight: 63 kg (139 lb) (11/02/22 1325)  Body mass index is 26.26 kg/m².  Body surface area is 1.65 meters squared.    I/O last 3 completed shifts:  In: 4099 [Blood:2694; NG/GT:1405]  Out: 3634 [Urine:800; Blood:2834]    Physical Exam  Vitals and nursing note reviewed.   Constitutional:       General: She is not in acute distress.     Appearance: She is well-developed. She is ill-appearing. She is not toxic-appearing.      Comments: Patient awake and alert and in no distress   HENT:      Head: Normocephalic and atraumatic.      Mouth/Throat:      Mouth: Mucous membranes are dry.   Eyes:      Conjunctiva/sclera: Conjunctivae normal.   Cardiovascular:      Rate and Rhythm: Normal rate and regular rhythm.      Heart sounds: Normal heart sounds.   Pulmonary:      Effort: Pulmonary effort is normal. No respiratory distress.      Breath sounds: No wheezing or rales.      Comments: Coarse breath sounds bilaterally  Abdominal:      General: Bowel sounds are normal. There is no distension.      Palpations: Abdomen is soft.      Tenderness: There is no abdominal tenderness.   Musculoskeletal:         General: No tenderness. Normal range of motion.      Cervical back: Normal range of motion and neck supple.      Right lower leg: No edema.      Left lower leg: No edema.   Lymphadenopathy:      Cervical: No cervical adenopathy.   Skin:     General: Skin is warm and dry.      Capillary Refill:  Capillary refill takes less than 2 seconds.      Findings: No rash.   Neurological:      General: No focal deficit present.      Mental Status: She is alert and oriented to person, place, and time.       Significant Labs:  CBC:   Recent Labs   Lab 11/03/22 0226   WBC 15.23*   RBC 2.63*   HGB 7.5*   HCT 22.8*   *   MCV 87   MCH 28.5   MCHC 32.9       CMP:   Recent Labs   Lab 11/03/22 0226   *   CALCIUM 8.3*   ALBUMIN 3.0*   PROT 4.8*   *   K 3.3*   CO2 27      *   CREATININE 4.1*   ALKPHOS 73   ALT 29   AST 29   BILITOT 0.6       All labs within the past 24 hours have been reviewed.     Significant Imaging:       Assessment/Plan:     Acute renal failure superimposed on stage 3 chronic kidney disease  Patient with baseline creatinine of 1 as recent as August 2022 with progressive worsening beginning in early October 1.3 (10/13)->1.6 (10/17)->3.0 (10/23) despite IV fluids.  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  Patient noted to have new onset proteinuria and hematuria over the last 1 month.  Additionally, would consider ATN in the setting of suspected sepsis from multifocal pneumonia.     Concerns for glomerulonephritis given patient's current clinical picture. Initial urine microscopy demonstrating muddy brown casts with likely acanthocytes noted as well. MITUL positive, proteinase 3 ab weakly positive, MPO strongly positive. Patient s/p high-dose IV solumedrol x3 doses, now on Solumedrol 50mg qd. Underwent kidney bx 10/27. Rheumatology consulted, and patient started on Plex, Ritux/cytoxan. Given continually worsening renal function and uremic encephalopathy, patient underwent SLED without complication on 10/27. Transferred to floor on 10/29. Significantly improved mentation on 10/31. Underwent HD 11/1. Patient also with increasing hypernatremia so added FWF to tube feeds.     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 Second dose Ritux on 11/3. Next dose of cytoxan on 11/10.     Recommendations:  - HD planned for tomorrow 11/4  - Plex #7 today  - Dose of Rituximab due today 11/3  - continue steroid taper  - strict intake and output  - renally dose medications and avoid nephrotoxins when possible  - replete electrolytes as appropriate        Thank you for your consult. I will follow-up with patient. Please contact us if you have any additional questions.    Bipin Frias MD  Nephrology  [facility] - Intensive Care ([place] [address])  "

## 2022-11-03 NOTE — PT/OT/SLP PROGRESS
Physical Therapy      Patient Name:  Kristin Goodman   MRN:  [id number]    PT to bedside for PT treatment session x2 attempts. Pt receiving plasmapheresis at bedside upon first attempt and medical team present upon second attempt. PT to return when appropriate.

## 2022-11-03 NOTE — CARE UPDATE
RAPID RESPONSE NURSE ROUND       Rounding completed with charge RNShelly for AI follow up reports no concerns verbalized at this time. Instructed to call [phone] for further concerns or assistance.

## 2022-11-03 NOTE — ASSESSMENT & PLAN NOTE
"Patient with baseline creatinine of 1 as recent as August 2022 with progressive worsening beginning in early October 1.3 (10/13)->1.6 (10/17)->3.0 (10/23) despite IV fluids.  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  Patient noted to have new onset proteinuria and hematuria over the last 1 month.  Additionally, would consider ATN in the setting of suspected sepsis from multifocal pneumonia.     Concerns for glomerulonephritis given patient's current clinical picture. Initial urine microscopy demonstrating muddy brown casts with likely acanthocytes noted as well. MITUL positive, proteinase 3 ab weakly positive, MPO strongly positive. Patient s/p high-dose IV solumedrol x3 doses, now on Solumedrol 50mg qd. Underwent kidney bx 10/27. Rheumatology consulted, and patient started on Plex, Ritux/cytoxan. Given continually worsening renal function and uremic encephalopathy, patient underwent SLED without complication on 10/27. Transferred to floor on 10/29. Significantly improved mentation on 10/31. Underwent HD 11/1. Patient also with increasing hypernatremia so added FWF to tube feeds.     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 Second dose Ritux on 11/3. Next dose of cytoxan on 11/10.     Recommendations:  - HD planned for tomorrow 11/4  - Plex #7 today  - Dose of Rituximab due today 11/3  - continue steroid taper  - strict intake and output  - renally dose medications and avoid nephrotoxins when possible  - replete electrolytes as appropriate  "

## 2022-11-03 NOTE — PLAN OF CARE
Problem: SLP  Goal: SLP Goal  Description: Speech Language Pathology Goals  Goals expected to be met by 11/6/22    1. Pt will participate in ongoing assessment of swallow function to determine safest, least restrictive means of nutrition/hydration  2. Educate Pt and family on aspiration precautions and SLP POC  3. Pt will tolerate trials of nectar-thickened liquids w/o overt S/S aspiration, MIN A  4. Pt will complete dysphagia exercises to improve timing of initiation of swallow x5 with 90% accuracy, MIN A    Outcome: Ongoing, Progressing     Modified Barium Swallow Study completed. Patient presents with Moderate Pharyngeal Dysphagia as c/b aspiration of thin liquids during the swallow, penetration of nectar-thickened liquids during the swallow and risk of penetration/aspiration of pureed residuals following the swallow. Attempts for compensatory strategies to reduce/prevent penetration/aspiration limited 2/2 delayed initiation of swallow, L neck pain and decreased endurance.  Findings reviewed with Radiologist. REC: continue NPO with temporary, alternative means nutrition/hydration/medication, frequent oral care and ST to continue to follow for dysphagia therapy. Pending progress, ongoing f/u with ENT might be warranted 2/2 persistent neck pain.  Full report to follow. Findings reviewed with Pt, RN and MD.

## 2022-11-03 NOTE — ASSESSMENT & PLAN NOTE
Patient is a 78 yo F with chronic diastolic heart failure, HTN, OA, who is admitted for respiratory failure. Rheumatology is consulted due to concern for vasculitis. Patient presented with cough and hemoptysis since 9/24/22. She was admitted due to dyspnea and hypoxia on 10/17. She has had Hb drop to 6 this admission requiring blood transfusions. Reviewed her chest imaging which shows progressive disease from 10/14 until now which can be concerning for DAH. This might also explain the Hb drop. No bronch planned for now due to her tenuous respiratory status. GI defers invasive workup for now because she is not stable enough. She has had increasing creatinine from baseline of 1 to 3.0 on 10/23/22. Nephrology concerned for ATN nephritic picture in urine sediment. Pending kidney biopsy result.    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   3)  CHANGES SUGGESTIVE OF FOCAL ACUTE PYELONEPHRITIS.   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     Treating empirically for ANCA vasculitis while awaiting kidney biopsy results:  - s/p IV Solumedrol 1000 mg x3 doses. Now following PEXIVAS steroid taper. Currently on IV Solumedrol 24 mg daily.  - PLEX 10/26, 10/27, 10/29. Next scheduled for 10/30. Total 7 treatments planned.  - Got SLED 10/27. Bedside HD 10/30  - Rituximab 375 mg/m^2 (600 mg) on 10/27 (every 7 day dose 1 of 4)  - Cyclophosphamide 7.5 mg/kg on 10/27 (every 14 day dose 1 of 2)    Recommendations:  1. Continue steroid  taper per PEXIVAS trial as in the chart below. Currently IV Solu-Medrol 24 mg daily (prednisone 30 mg daily equivalent). Started on 10/30  2. Atovaquone 1500 mg and Protonix daily while on high dose steroids.  3. Next Rituximab 275 mg/m^2 due today  4. Next cyclophosphamide 7.5 mg/kg due on November 10  5. Monitor CBC and CMP in 10-14 days after giving chemotherapy  6. Follow up kidney biopsy

## 2022-11-03 NOTE — PROGRESS NOTES
[facility] - Intensive Care ([facility]-)  Adult Nutrition  Consult Note    SUMMARY     Recommendations    1. Continue current TF regimen of Novasource @ 35 mL/hr - meeting needs.   2. RD to monitor & follow-up.    Goals: Meet % EEN, EPN by RD f/u date  Nutrition Goal Status: goal met  Communication of RD Recs: reviewed with RN    Assessment and Plan    Moderate malnutrition    Nutrition Problem:  Moderate Protein-Calorie Malnutrition  Malnutrition in the context of Acute Illness/Injury    Related to (etiology):  Inability to consume sufficient energy    Signs and Symptoms (as evidenced by):  Body Fat Depletion: moderate depletion of orbitals and triceps   Muscle Mass Depletion: moderate depletion of temples and clavicle region   Weight Loss: 7.5% x 3 months  Fluid Accumulation: mild    Interventions(treatment strategy):  Collaboration of nutrition care w/ other providers  EN    Nutrition Diagnosis Status:  Continues    Malnutrition Assessment    Weight Loss (Malnutrition): 7.5% in 3 months   Orbital Region (Subcutaneous Fat Loss): moderate depletion  Upper Arm Region (Subcutaneous Fat Loss): moderate depletion   [place] Region (Muscle Loss): moderate depletion  Clavicle Bone Region (Muscle Loss): moderate depletion   Edema (Fluid Accumulation): 2-->mild     Reason for Assessment    Reason For Assessment: RD follow-up  Diagnosis: other (see comments) (Polyangiittis)  Relevant Medical History: HTN, HF  Interdisciplinary Rounds: did not attend    General Information Comments: NPO, per SLP - tolerating TFs via NGT. UBW: 150#, per chart review. NFPE complete 10/27 - moderate fat & muscle wasting. Pt meets criteria for moderate malnutrition. Please see PES statement for details.  Nutrition Discharge Planning: Pending clinical course    Nutrition/Diet History    Spiritual, Cultural Beliefs, [employer] Practices, Values that Affect Care: no  Food Allergies: other (see comments) (Peaches)  Factors Affecting  "Nutritional Intake: NPO    Anthropometrics    Temp: 98.7 °F (37.1 °C)  Height Method: Stated  Height: 5' 1" (154.9 cm)  Height (inches): 61 in  Weight Method: Bed Scale  Weight: 63 kg (138 lb 14.2 oz)  Weight (lb): 138.89 lb  Ideal Body Weight (IBW), Female: 105 lb  % Ideal Body Weight, Female (lb): 132.28 %  BMI (Calculated): 26.3  BMI Grade: 25 - 29.9 - overweight  Usual Body Weight (UBW), k.7 kg  % Usual Body Weight: 92.62  % Weight Change From Usual Weight: -7.57 %    Lab/Procedures/Meds    Pertinent Labs Reviewed: reviewed  Pertinent Labs Comments: Na 147, , Creat 4.1, GFR 10.8, P 5.2  Pertinent Medications Reviewed: reviewed    Estimated/Assessed Needs    Weight Used For Calorie Calculations: 63 kg (138 lb 14.2 oz)    Energy Calorie Requirements (kcal): 0311-7185 kcal/d  Energy Need Method: Kcal/kg (25-30 kcal/kg)    Protein Requirements: 76-88 g/d (1.2-1.4 g/kg)  Weight Used For Protein Calculations: 63 kg (138 lb 14.2 oz)    Estimated Fluid Requirement Method: other (see comments) (Per MD or 1 mL/kcal)  RDA Method (mL): 1575    Nutrition Prescription Ordered    Current Diet Order: NPO  Current Nutrition Support Formula Ordered: Novasource Renal  Current Nutrition Support Rate Ordered: 35 mL/hr    Evaluation of Received Nutrient/Fluid Intake    Enteral Calories (kcal): 1680  Enteral Protein (gm): 76  Enteral (Free Water) Fluid (mL): 602  Free Water Flush Fluid (mL): 1600    % Kcal Needs: 100%  % Protein Needs: 100%    I/O: +10.8L since 10/20    Energy Calories Required: meeting needs  Protein Required: meeting needs  Fluid Required: other (see comments) (Per MD)    Comments: LBM:     Tolerance: tolerating    Nutrition Risk    Level of Risk/Frequency of Follow-up:  (1x/week)     Monitor and Evaluation    Food and Nutrient Intake: energy intake, food and beverage intake, enteral nutrition intake  Food and Nutrient Adminstration: diet order, enteral and parenteral nutrition " administration  Physical Activity and Function: nutrition-related ADLs and IADLs  Anthropometric Measurements: weight, weight change  Biochemical Data, Medical Tests and Procedures: inflammatory profile, lipid profile, glucose/endocrine profile, gastrointestinal profile, electrolyte and renal panel  Nutrition-Focused Physical Findings: overall appearance     Nutrition Follow-Up    RD Follow-up?: Yes

## 2022-11-03 NOTE — SUBJECTIVE & OBJECTIVE
Interval History: completed PLEX this morning. Plan to give Rituximab today. Feels well    Current Facility-Administered Medications   Medication Frequency    0.9%  NaCl infusion (for blood administration) Q24H PRN    0.9%  NaCl infusion (for blood administration) Q24H PRN    acetaminophen tablet 650 mg Q4H PRN    acetaminophen tablet 650 mg 1 time in Clinic/HOD    albuterol-ipratropium 2.5 mg-0.5 mg/3 mL nebulizer solution 3 mL Q4H PRN    aluminum-magnesium hydroxide-simethicone 200-200-20 mg/5 mL suspension 30 mL QID PRN    amLODIPine tablet 10 mg Daily    atovaquone 750 mg/5 mL oral liquid 1,500 mg Daily    bisacodyL suppository 10 mg Daily PRN    carvediloL tablet 12.5 mg BID    dextrose 10% bolus 125 mL PRN    dextrose 10% bolus 250 mL PRN    diphenhydrAMINE (BENADRYL) 25 mg in NS 50 mL IVPB 1 time in Clinic/HOD    famotidine (PF) injection 20 mg 1 time in Clinic/HOD    glucagon (human recombinant) injection 1 mg PRN    glucose chewable tablet 16 g PRN    glucose chewable tablet 24 g PRN    hydrALAZINE tablet 25 mg Q8H    hydrALAZINE tablet 50 mg Q8H PRN    insulin aspart U-100 pen 1-10 Units Q6H PRN    levothyroxine tablet 25 mcg Before breakfast    lisinopriL tablet 40 mg Daily    melatonin tablet 6 mg Nightly PRN    meperidine injection 25 mg PRN    methocarbamoL tablet 500 mg TID PRN    methylPREDNISolone sodium succinate injection 100 mg Daily    methylPREDNISolone sodium succinate injection 24 mg Daily    naloxone 0.4 mg/mL injection 0.02 mg PRN    ondansetron disintegrating tablet 8 mg Q8H PRN    pantoprazole suspension 40 mg Daily    potassium bicarbonate disintegrating tablet 50 mEq Once    prochlorperazine injection Soln 5 mg Q6H PRN    QUEtiapine tablet 25 mg QHS    riTUXimab (RITUXAN) 600 mg in sodium chloride 0.9% 600 mL infusion (conc: 1 mg/mL) 1 time in Clinic/HOD    simethicone chewable tablet 80 mg QID PRN    sodium chloride 0.9% 250 mL flush bag 1 time in Clinic/HOD    sodium chloride 0.9% 250  mL flush bag 1 time in Clinic/HOD    sodium chloride 0.9% flush 10 mL PRN    sodium chloride 0.9% flush 10 mL PRN    sodium chloride 0.9% flush 5 mL PRN     Facility-Administered Medications Ordered in Other Encounters   Medication Frequency    celecoxib capsule 400 mg Once    fentaNYL 50 mcg/mL injection  mcg PRN    LIDOcaine (PF) 10 mg/ml (1%) injection 10 mg Once PRN    LIDOcaine (PF) 10 mg/ml (1%) injection 10 mg Once    midazolam (VERSED) 1 mg/mL injection 0.5-4 mg PRN    ropivacaine 0.2% [facility] PainPRO Pump infusion 500 ML Continuous     Objective:     Vital Signs (Most Recent):  Temp: 98.7 °F (37.1 °C) (11/03/22 1105)  Pulse: 81 (11/03/22 1220)  Resp: 18 (11/03/22 1220)  BP: (!) 152/66 (11/03/22 1220)  SpO2: (!) 91 % (11/03/22 1220)  O2 Device (Oxygen Therapy): nasal cannula (11/03/22 0130)   Vital Signs (24h Range):  Temp:  [97.6 °F (36.4 °C)-99 °F (37.2 °C)] 98.7 °F (37.1 °C)  Pulse:  [] 81  Resp:  [16-33] 18  SpO2:  [91 %-100 %] 91 %  BP: (132-170)/(60-77) 152/66     Weight: 63 kg (138 lb 14.2 oz) (11/03/22 1000)  Body mass index is 26.24 kg/m².  Body surface area is 1.65 meters squared.      Intake/Output Summary (Last 24 hours) at 11/3/2022 1355  Last data filed at 11/3/2022 1105  Gross per 24 hour   Intake 6260 ml   Output 6603 ml   Net -343 ml       Physical Exam   Constitutional: No distress.   HENT:   Head: Normocephalic and atraumatic.   Nose: No rhinorrhea or nasal congestion.   Mouth/Throat: Oropharynx is clear.   No tenderness to palpation of sinuses. NGT in place   Ears: No tenderness to palpation     Eyes: Pupils are equal, round, and reactive to light.   Cardiovascular: Normal rate and regular rhythm.   No murmur heard.  Pulmonary/Chest: Effort normal and breath sounds normal. No respiratory distress. She has no wheezes.   Abdominal: Soft. Bowel sounds are normal. There is no abdominal tenderness.   Musculoskeletal:         General: No deformity. Normal range of motion.       Cervical back: Normal range of motion.   Neurological: She is alert.   Skin: Skin is warm and dry.   Psychiatric: Affect and judgment normal.     Significant Labs:  All pertinent lab results from the last 24 hours have been reviewed.    Significant Imaging:  Imaging results within the past 24 hours have been reviewed.

## 2022-11-03 NOTE — SUBJECTIVE & OBJECTIVE
Interval History: NAEON. Patient reports feeling well today, more energy and better spirits. Still NPO as failed SLP swallow test. To receive Ritux/cytoxan and Plex today. Will likely defer HD until tomorrow.     Review of patient's allergies indicates:   Allergen Reactions    Ampicillin     Peaches [peach (prunus persica)] Other (See Comments)     Pt unable to state type of reaction. Information obtained from daughter who states she was informed of allergy from patient.    Penicillins      Other reaction(s): Hives    Sulfa (sulfonamide antibiotics) Rash and Hives     Current Facility-Administered Medications   Medication Frequency    0.9%  NaCl infusion (for blood administration) Q24H PRN    0.9%  NaCl infusion (for blood administration) Q24H PRN    acetaminophen tablet 650 mg Q4H PRN    albumin human 5% bottle 50 g Once    albuterol-ipratropium 2.5 mg-0.5 mg/3 mL nebulizer solution 3 mL Q4H PRN    aluminum-magnesium hydroxide-simethicone 200-200-20 mg/5 mL suspension 30 mL QID PRN    amLODIPine tablet 10 mg Daily    atovaquone 750 mg/5 mL oral liquid 1,500 mg Daily    bisacodyL suppository 10 mg Daily PRN    calcium gluconate 1 g in sodium chloride 0.9% 100 mL IVPB Once    carvediloL tablet 12.5 mg BID    dextrose 10% bolus 125 mL PRN    dextrose 10% bolus 250 mL PRN    diphenhydrAMINE injection 50 mg Once    glucagon (human recombinant) injection 1 mg PRN    glucose chewable tablet 16 g PRN    glucose chewable tablet 24 g PRN    heparin (porcine) injection 3,200 Units Once    hydrALAZINE tablet 25 mg Q8H    hydrALAZINE tablet 50 mg Q8H PRN    insulin aspart U-100 pen 1-10 Units Q6H PRN    levothyroxine tablet 25 mcg Before breakfast    lisinopriL tablet 40 mg Daily    melatonin tablet 6 mg Nightly PRN    methocarbamoL tablet 500 mg TID PRN    methylPREDNISolone sodium succinate injection 24 mg Daily    naloxone 0.4 mg/mL injection 0.02 mg PRN    ondansetron disintegrating tablet 8 mg Q8H PRN    pantoprazole  suspension 40 mg Daily    prochlorperazine injection Soln 5 mg Q6H PRN    QUEtiapine tablet 25 mg QHS    simethicone chewable tablet 80 mg QID PRN    sodium chloride 0.9% 250 mL flush bag 1 time in Clinic/HOD    sodium chloride 0.9% flush 10 mL PRN    sodium chloride 0.9% flush 5 mL PRN     Facility-Administered Medications Ordered in Other Encounters   Medication Frequency    celecoxib capsule 400 mg Once    fentaNYL 50 mcg/mL injection  mcg PRN    LIDOcaine (PF) 10 mg/ml (1%) injection 10 mg Once PRN    LIDOcaine (PF) 10 mg/ml (1%) injection 10 mg Once    midazolam (VERSED) 1 mg/mL injection 0.5-4 mg PRN    ropivacaine 0.2% [facility] PainPRO Pump infusion 500 ML Continuous       Objective:     Vital Signs (Most Recent):  Temp: 98.7 °F (37.1 °C) (11/03/22 0725)  Pulse: 85 (11/03/22 0725)  Resp: 18 (11/03/22 0725)  BP: (!) 168/74 (11/03/22 0725)  SpO2: (!) 92 % (11/03/22 0725)  O2 Device (Oxygen Therapy): nasal cannula (11/03/22 0130)   Vital Signs (24h Range):  Temp:  [97.6 °F (36.4 °C)-98.7 °F (37.1 °C)] 98.7 °F (37.1 °C)  Pulse:  [] 85  Resp:  [14-33] 18  SpO2:  [92 %-100 %] 92 %  BP: (132-189)/(60-84) 168/74     Weight: 63 kg (139 lb) (11/02/22 1325)  Body mass index is 26.26 kg/m².  Body surface area is 1.65 meters squared.    I/O last 3 completed shifts:  In: 4099 [Blood:2694; NG/GT:1405]  Out: 3634 [Urine:800; Blood:2834]    Physical Exam  Vitals and nursing note reviewed.   Constitutional:       General: She is not in acute distress.     Appearance: She is well-developed. She is ill-appearing. She is not toxic-appearing.      Comments: Patient awake and alert and in no distress   HENT:      Head: Normocephalic and atraumatic.      Mouth/Throat:      Mouth: Mucous membranes are dry.   Eyes:      Conjunctiva/sclera: Conjunctivae normal.   Cardiovascular:      Rate and Rhythm: Normal rate and regular rhythm.      Heart sounds: Normal heart sounds.   Pulmonary:      Effort: Pulmonary effort is normal.  No respiratory distress.      Breath sounds: No wheezing or rales.      Comments: Coarse breath sounds bilaterally  Abdominal:      General: Bowel sounds are normal. There is no distension.      Palpations: Abdomen is soft.      Tenderness: There is no abdominal tenderness.   Musculoskeletal:         General: No tenderness. Normal range of motion.      Cervical back: Normal range of motion and neck supple.      Right lower leg: No edema.      Left lower leg: No edema.   Lymphadenopathy:      Cervical: No cervical adenopathy.   Skin:     General: Skin is warm and dry.      Capillary Refill: Capillary refill takes less than 2 seconds.      Findings: No rash.   Neurological:      General: No focal deficit present.      Mental Status: She is alert and oriented to person, place, and time.       Significant Labs:  CBC:   Recent Labs   Lab 11/03/22 0226   WBC 15.23*   RBC 2.63*   HGB 7.5*   HCT 22.8*   *   MCV 87   MCH 28.5   MCHC 32.9       CMP:   Recent Labs   Lab 11/03/22 0226   *   CALCIUM 8.3*   ALBUMIN 3.0*   PROT 4.8*   *   K 3.3*   CO2 27      *   CREATININE 4.1*   ALKPHOS 73   ALT 29   AST 29   BILITOT 0.6       All labs within the past 24 hours have been reviewed.     Significant Imaging:

## 2022-11-03 NOTE — PLAN OF CARE
Plan of care reviewed with pt. Pt aaox4. Tube feeds running at goat of 35 ml/hr. Pt went for barium swallow today. Pt did not pass. Pt remains NPO. Speech still following pt and will continue to work with pts swallowing. Apheresis and rotuxian given today. Pt will go to HD tomorrow. Rectal tube removed. Pt has 1 BM since removal. VSS. Pt remained free of falls, trauma, and injury. Will continue to monitor.
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
## 2022-11-04 LAB
ALBUMIN SERPL BCP-MCNC: 3.4 G/DL (ref 3.5–5.2)
ALP SERPL-CCNC: 72 U/L (ref 55–135)
ALT SERPL W/O P-5'-P-CCNC: 36 U/L (ref 10–44)
ANION GAP SERPL CALC-SCNC: 16 MMOL/L (ref 8–16)
AST SERPL-CCNC: 31 U/L (ref 10–40)
BASOPHILS # BLD AUTO: 0.03 K/UL (ref 0–0.2)
BASOPHILS NFR BLD: 0.2 % (ref 0–1.9)
BILIRUB SERPL-MCNC: 0.6 MG/DL (ref 0.1–1)
BUN SERPL-MCNC: 148 MG/DL (ref 8–23)
CALCIUM SERPL-MCNC: 8.9 MG/DL (ref 8.7–10.5)
CHLORIDE SERPL-SCNC: 107 MMOL/L (ref 95–110)
CO2 SERPL-SCNC: 26 MMOL/L (ref 23–29)
CREAT SERPL-MCNC: 4.5 MG/DL (ref 0.5–1.4)
DIFFERENTIAL METHOD: ABNORMAL
EOSINOPHIL # BLD AUTO: 0 K/UL (ref 0–0.5)
EOSINOPHIL NFR BLD: 0 % (ref 0–8)
ERYTHROCYTE [DISTWIDTH] IN BLOOD BY AUTOMATED COUNT: 16 % (ref 11.5–14.5)
EST. GFR  (NO RACE VARIABLE): 9.7 ML/MIN/1.73 M^2
GLUCOSE SERPL-MCNC: 173 MG/DL (ref 70–110)
HCT VFR BLD AUTO: 24.4 % (ref 37–48.5)
HGB BLD-MCNC: 7.9 G/DL (ref 12–16)
IMM GRANULOCYTES # BLD AUTO: 0.22 K/UL (ref 0–0.04)
IMM GRANULOCYTES NFR BLD AUTO: 1.4 % (ref 0–0.5)
LYMPHOCYTES # BLD AUTO: 0.7 K/UL (ref 1–4.8)
LYMPHOCYTES NFR BLD: 4.3 % (ref 18–48)
MAGNESIUM SERPL-MCNC: 1.8 MG/DL (ref 1.6–2.6)
MCH RBC QN AUTO: 28.8 PG (ref 27–31)
MCHC RBC AUTO-ENTMCNC: 32.4 G/DL (ref 32–36)
MCV RBC AUTO: 89 FL (ref 82–98)
MONOCYTES # BLD AUTO: 0.5 K/UL (ref 0.3–1)
MONOCYTES NFR BLD: 3.3 % (ref 4–15)
NEUTROPHILS # BLD AUTO: 14.2 K/UL (ref 1.8–7.7)
NEUTROPHILS NFR BLD: 90.8 % (ref 38–73)
NRBC BLD-RTO: 0 /100 WBC
PHOSPHATE SERPL-MCNC: 4.8 MG/DL (ref 2.7–4.5)
PLATELET # BLD AUTO: 158 K/UL (ref 150–450)
PMV BLD AUTO: 13.8 FL (ref 9.2–12.9)
POCT GLUCOSE: 125 MG/DL (ref 70–110)
POCT GLUCOSE: 145 MG/DL (ref 70–110)
POCT GLUCOSE: 170 MG/DL (ref 70–110)
POCT GLUCOSE: 176 MG/DL (ref 70–110)
POCT GLUCOSE: 201 MG/DL (ref 70–110)
POTASSIUM SERPL-SCNC: 4 MMOL/L (ref 3.5–5.1)
PROT SERPL-MCNC: 5.5 G/DL (ref 6–8.4)
RBC # BLD AUTO: 2.74 M/UL (ref 4–5.4)
SODIUM SERPL-SCNC: 149 MMOL/L (ref 136–145)
WBC # BLD AUTO: 15.67 K/UL (ref 3.9–12.7)

## 2022-11-04 PROCEDURE — 25000003 PHARM REV CODE 250: Performed by: STUDENT IN AN ORGANIZED HEALTH CARE EDUCATION/TRAINING PROGRAM

## 2022-11-04 PROCEDURE — 99900035 HC TECH TIME PER 15 MIN (STAT)

## 2022-11-04 PROCEDURE — 20600001 HC STEP DOWN PRIVATE ROOM

## 2022-11-04 PROCEDURE — 97530 THERAPEUTIC ACTIVITIES: CPT

## 2022-11-04 PROCEDURE — 99231 PR SUBSEQUENT HOSPITAL CARE,LEVL I: ICD-10-PCS | Mod: ,,, | Performed by: INTERNAL MEDICINE

## 2022-11-04 PROCEDURE — 84100 ASSAY OF PHOSPHORUS: CPT

## 2022-11-04 PROCEDURE — 36415 COLL VENOUS BLD VENIPUNCTURE: CPT

## 2022-11-04 PROCEDURE — 99233 PR SUBSEQUENT HOSPITAL CARE,LEVL III: ICD-10-PCS | Mod: ,,, | Performed by: INTERNAL MEDICINE

## 2022-11-04 PROCEDURE — 63600175 PHARM REV CODE 636 W HCPCS: Performed by: STUDENT IN AN ORGANIZED HEALTH CARE EDUCATION/TRAINING PROGRAM

## 2022-11-04 PROCEDURE — 80053 COMPREHEN METABOLIC PANEL: CPT

## 2022-11-04 PROCEDURE — 97110 THERAPEUTIC EXERCISES: CPT

## 2022-11-04 PROCEDURE — 99231 SBSQ HOSP IP/OBS SF/LOW 25: CPT | Mod: ,,, | Performed by: INTERNAL MEDICINE

## 2022-11-04 PROCEDURE — 83735 ASSAY OF MAGNESIUM: CPT

## 2022-11-04 PROCEDURE — 94761 N-INVAS EAR/PLS OXIMETRY MLT: CPT

## 2022-11-04 PROCEDURE — 92507 TX SP LANG VOICE COMM INDIV: CPT

## 2022-11-04 PROCEDURE — 97535 SELF CARE MNGMENT TRAINING: CPT

## 2022-11-04 PROCEDURE — 63600175 PHARM REV CODE 636 W HCPCS: Performed by: INTERNAL MEDICINE

## 2022-11-04 PROCEDURE — 99233 SBSQ HOSP IP/OBS HIGH 50: CPT | Mod: ,,, | Performed by: INTERNAL MEDICINE

## 2022-11-04 PROCEDURE — 97112 NEUROMUSCULAR REEDUCATION: CPT

## 2022-11-04 PROCEDURE — 94660 CPAP INITIATION&MGMT: CPT

## 2022-11-04 PROCEDURE — 90935 HEMODIALYSIS ONE EVALUATION: CPT

## 2022-11-04 PROCEDURE — 85025 COMPLETE CBC W/AUTO DIFF WBC: CPT

## 2022-11-04 RX ORDER — SODIUM CHLORIDE 9 MG/ML
INJECTION, SOLUTION INTRAVENOUS ONCE
Status: COMPLETED | OUTPATIENT
Start: 2022-11-04 | End: 2022-11-04

## 2022-11-04 RX ADMIN — PANTOPRAZOLE SODIUM 40 MG: 40 GRANULE, DELAYED RELEASE ORAL at 08:11

## 2022-11-04 RX ADMIN — CARVEDILOL 12.5 MG: 12.5 TABLET, FILM COATED ORAL at 08:11

## 2022-11-04 RX ADMIN — AMLODIPINE BESYLATE 10 MG: 10 TABLET ORAL at 08:11

## 2022-11-04 RX ADMIN — SODIUM CHLORIDE: 0.9 INJECTION, SOLUTION INTRAVENOUS at 10:11

## 2022-11-04 RX ADMIN — HYDRALAZINE HYDROCHLORIDE 25 MG: 25 TABLET, FILM COATED ORAL at 03:11

## 2022-11-04 RX ADMIN — METHYLPREDNISOLONE SODIUM SUCCINATE 24 MG: 40 INJECTION, POWDER, FOR SOLUTION INTRAMUSCULAR; INTRAVENOUS at 08:11

## 2022-11-04 RX ADMIN — LEVOTHYROXINE SODIUM 25 MCG: 25 TABLET ORAL at 05:11

## 2022-11-04 RX ADMIN — METHYLPREDNISOLONE SODIUM SUCCINATE 100 MG: 125 INJECTION, POWDER, FOR SOLUTION INTRAMUSCULAR; INTRAVENOUS at 08:11

## 2022-11-04 RX ADMIN — INSULIN ASPART 2 UNITS: 100 INJECTION, SOLUTION INTRAVENOUS; SUBCUTANEOUS at 12:11

## 2022-11-04 RX ADMIN — ATOVAQUONE 1500 MG: 750 SUSPENSION ORAL at 08:11

## 2022-11-04 RX ADMIN — LISINOPRIL 40 MG: 20 TABLET ORAL at 08:11

## 2022-11-04 RX ADMIN — HYDRALAZINE HYDROCHLORIDE 25 MG: 25 TABLET, FILM COATED ORAL at 05:11

## 2022-11-04 NOTE — PLAN OF CARE
Problem: Hemodynamic Instability (Hemodialysis)  Goal: Effective Tissue Perfusion  Outcome: Ongoing, Progressing     Dialysis tx ended to the left IJ trialysis cath, saline locked and capped.    Net fluid removed: 0    Report given to  nurse [place]. Pt transported by stretcher from [facility] to room 04782.

## 2022-11-04 NOTE — ASSESSMENT & PLAN NOTE
The patient is a 77 year old female with deconditioning 2/2 prolonged hospital course c/b vasculitits, pneumonia, and now dysphagia. MBSS showing global weakness in swallowing as well as aspiration with thin liquids. Patient did not tolerate scope exam so unable to visualize vocal cords but given her lack of hoarseness, I do not suspect a vocal fold paresis/paralysis.     - Continue speech therapy for dysphagia  - Continue rehab for deconditioning  - No ENT intervention warranted at this time

## 2022-11-04 NOTE — PT/OT/SLP PROGRESS
"Physical Therapy Treatment    Patient Name:  Kristin Goodman   MRN:  [id number]    Recommendations:     Discharge Recommendations:  nursing facility, skilled   Discharge Equipment Recommendations: bedside commode   Barriers to discharge: Pt currently requiring increased level of assistance with mobility    Assessment:     Kristin Goodman is a 77 y.o. female admitted with a medical diagnosis of Microscopic polyangiitis.  She presents with the following impairments/functional limitations:  weakness, impaired endurance, impaired functional mobility, impaired balance, gait instability, decreased lower extremity function. Pt appeared alert and motivated this session. Pt found in bedside chair sitting upright for 30 min post OT session. Pt performed 3 reps STS RW from low surface chair with Humberto-maxA with focus on static/dynamic standing balance and standing tolerance. Pt able to tolerate up to 15s of standing prior to visible fatigue. Pt continues to benefit from skilled PT services while in house in order to address the aforementioned deficits.       Rehab Prognosis: Good; patient would benefit from acute skilled PT services to address these deficits and reach maximum level of function.    Recent Surgery: * No surgery found *      Plan:     During this hospitalization, patient to be seen 3 x/week to address the identified rehab impairments via gait training, therapeutic activities, therapeutic exercises, neuromuscular re-education and progress toward the following goals:    Plan of Care Expires:  11/30/22    Subjective     Chief Complaint: "We can try"  Patient/Family Comments/goals: improve mobility  Pain/Comfort:  Pain Rating 1: 0/10  Pain Rating Post-Intervention 1: 0/10      Objective:     Communicated with RN prior to session.  Patient found up in chair with telemetry, NG tube, pulse ox (continuous) upon PT entry to room.     General Precautions: Standard, fall   Orthopedic Precautions:N/A   Braces: N/A  Respiratory " Status: Room air     Functional Mobility:  Transfers:     Sit to Stand:  Pt performed 3 reps STS from lower surface RW Humberto-maxA respectively. Pt required constant verbal cues for hand placement as pt attempts to pull from walker. Pt presented with decreased recall and carry over with faded feedback.  Balance:   Fair-poor standing balance      AM-PAC 6 CLICK MOBILITY  Turning over in bed (including adjusting bedclothes, sheets and blankets)?: 3  Sitting down on and standing up from a chair with arms (e.g., wheelchair, bedside commode, etc.): 3  Moving from lying on back to sitting on the side of the bed?: 2  Moving to and from a bed to a chair (including a wheelchair)?: 2  Need to walk in hospital room?: 2  Climbing 3-5 steps with a railing?: 1  Basic Mobility Total Score: 13       Treatment & Education:  Pt tolerated up to 15s of standing with RW, however required prolong seated rest breaks in between bouts. Pt c/o lightheadness however BP stable post stand. Pt demonstrated marching in place and lateral sway with no buckling noted.     Educated pt on PT role/POC  Educated pt on importance of OOB activity and daily ambulation   Pt educated on proper body mechanics, safety techniques, and energy conservation with PT facilitation and cueing throughout session   Pt verbalized understanding      Patient left up in chair with all lines intact, call button in reach, RN notified, and daughter present..    GOALS:   Multidisciplinary Problems       Physical Therapy Goals          Problem: Physical Therapy    Goal Priority Disciplines Outcome Goal Variances Interventions   Physical Therapy Goal     PT, PT/OT Ongoing, Progressing     Description: Goals to be met by: 2022     Patient will increase functional independence with mobility by performin. Supine to sit with contact guard assistance  2. Sit to supine with contact guard assistance  3. Sit to stand transfer with minimum assistance  4. Gait  x 40 feet with  minimum assistance using LRAD as needed  5. Lower extremity exercise program x10 reps per handout, with independence                        Time Tracking:     PT Received On: 11/04/22  PT Start Time: 0928     PT Stop Time: 0956  PT Total Time (min): 28 min     Billable Minutes: Therapeutic Activity 10 and Neuromuscular Re-education 18    Treatment Type: Treatment  PT/PTA: PT     PTA Visit Number: 0     11/04/2022

## 2022-11-04 NOTE — ASSESSMENT & PLAN NOTE
Patient is a 78 yo F with chronic diastolic heart failure, HTN, OA, who is admitted for respiratory failure. Rheumatology is consulted due to concern for vasculitis. Patient presented with cough and hemoptysis since 9/24/22. She was admitted due to dyspnea and hypoxia on 10/17. She has had Hb drop to 6 this admission requiring blood transfusions. Reviewed her chest imaging which shows progressive disease from 10/14 until now which can be concerning for DAH. This might also explain the Hb drop. No bronch planned for now due to her tenuous respiratory status. GI defers invasive workup for now because she is not stable enough. She has had increasing creatinine from baseline of 1 to 3.0 on 10/23/22. 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   3)  CHANGES SUGGESTIVE OF FOCAL ACUTE PYELONEPHRITIS.   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     Treating empirically for ANCA vasculitis while awaiting kidney biopsy results:  - s/p IV Solumedrol 1000 mg x3 doses. Now following PEXIVAS steroid taper. Currently on IV Solumedrol 24 mg daily.  - PLEX 10/26, 10/27, 10/29. Next scheduled for 10/30. Total 7 treatments planned.  - Got SLED 10/27. Bedside HD 10/30  - Rituximab 375 mg/m^2 (600 mg) on 10/27 (every 7 day dose 1 of 4)  - Cyclophosphamide 7.5 mg/kg on 10/27 (every 14 day dose 1 of 2)    Recommendations:  1. Continue steroid taper per PEXIVAS trial as in the  chart below. Currently IV Solu-Medrol 24 mg daily (prednisone 30 mg daily equivalent). Started on 10/30  2. Atovaquone 1500 mg and Protonix daily while on high dose steroids.  3. Next Rituximab 275 mg/m^2 due 11/10  4. Next cyclophosphamide 7.5 mg/kg due on November 10  5. Monitor CBC and CMP in 10-14 days after giving chemotherapy

## 2022-11-04 NOTE — PLAN OF CARE
Problem: Occupational Therapy  Goal: Occupational Therapy Goal  Description: Goals to be met by: 11/15     Patient will increase functional independence with ADLs by performing:    UE Dressing with Modified [place].  LE Dressing with Modified [place].  Grooming while standing with Modified [place].  Toileting from toilet with Modified [place] for hygiene and clothing management.   Supine to sit with Modified [place].  Step transfer with Modified [place]  Toilet transfer to toilet with Modified [place].    Outcome: Ongoing, Progressing     Goals remain appropriate
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
## 2022-11-04 NOTE — AI DETERIORATION ALERT
"RAPID RESPONSE NURSE AI ALERT       AI alert received.    Chart Reviewed: 11/04/2022, 6:24 PM    MRN: [id number]  Bed: 99250/58411 A    Dx: Microscopic polyangiitis    Kristin Goodman has a past medical history of Acute blood loss anemia, Acute hypoxemic respiratory failure, Allergy, Back pain, Chronic diastolic heart failure, Chronic diastolic heart failure, Colon polyp, Disorder of kidney and ureter, H/O Bell's palsy, Helicobacter pylori (H. pylori), HTN (hypertension), Hypothyroid, OA (osteoarthritis), DONAVAN (obstructive sleep apnea), Pneumonia due to other staphylococcus, Pulmonary HTN, Sepsis due to pneumonia, Septic shock, Trouble in sleeping, and Urinary incontinence.    Last VS: BP (!) 158/70 (BP Location: Left arm)   Pulse 78   Temp 98.1 °F (36.7 °C) (Oral)   Resp 17   Ht 5' 1" (1.549 m)   Wt 63 kg (138 lb 14.2 oz)   SpO2 (!) 94%   BMI 26.24 kg/m²     24H Vital Sign Range:  Temp:  [97 °F (36.1 °C)-98.8 °F (37.1 °C)]   Pulse:  [66-93]   Resp:  [15-20]   BP: ()/(50-83)   SpO2:  [92 %-95 %]     Level of Consciousness (AVPU): alert    Recent Labs     11/02/22  0554 11/03/22 0226 11/04/22  0445   WBC 21.91* 15.23* 15.67*   HGB 8.1* 7.5* 7.9*   HCT 24.2* 22.8* 24.4*    140* 158       Recent Labs     11/02/22  0554 11/03/22  0226 11/04/22  0445    147* 149*   K 3.7 3.3* 4.0    105 107   CO2 28 27 26   CREATININE 4.6* 4.1* 4.5*   * 126* 173*   PHOS 5.3* 5.2* 4.8*   MG 1.9 1.7 1.8        No results for input(s): PH, PCO2, PO2, HCO3, POCSATURATED, BE in the last 72 hours.     OXYGEN:  Flow (L/min): 1  Oxygen Concentration (%): 50  O2 Device (Oxygen Therapy): room air    MEWS score: 1    charge RN, [place]  contacted. Pt tachypneic and tachy during NGT placement. Instructed to call [phone] for further concerns or assistance.    Timothy Murphy RN        "

## 2022-11-04 NOTE — PROGRESS NOTES
"[facility] ([place] [id number])  Rheumatology  Progress Note    Patient Name: Kristin Goodman  MRN: [id number]  Admission Date: 10/17/2022  Hospital Length of Stay: 18 days  Code Status: Full Code   Attending Provider: Immanuel Peres MD  Primary Care Physician: Lori Hernandez MD  Principal Problem: Microscopic polyangiitis    Subjective:     HPI: Patient is a 76 yo F with chronic diastolic heart failure, HTN, OA, who is admitted for respiratory failure. Rheumatology is consulted due to concern for vasculitis. Patient initially presented to the ED on 9/24/22 complaining of a week of cough followed by an episode of hemoptysis on 9/24 prompting the ED visit. They did a CTA which showed multifocal PNA. She was sent home with Levaquin. She followed up with PCP on 10/4/22 and was feeling better. Then she presented to the ED again on 10/14 and on 10/17 complaining of dyspnea. She had fevers in the few days leading up to admission of 102. She endorsed weakness, productive cough, dyspnea, and loss of appetite. Pt initally at 88% O2 saturation and improved to 98% on 2L NC. She was admitted for IV antibiotics. CT chest with contrast on 10/17 showed evidence of interval progression of bilateral perihilar dense consolidation with peripheral patchy areas of ground-glass opacification nonspecific as to the etiology.  Bilateral pneumonia as well as inflammatory etiologies considered.  Cardiogenic pulmonary edema considered less likely given the pattern.    On 10/19/22 she started having melena. Hb dropped to 6. She got 2 units pRBCs. GI was consulted. They noted "Colonoscopy in 2020 showed internal hemorrhoids and mild sigmoid diverticulosis. EGD in 2012 showed gastritis, biopsies positive for H. Pylori." "We considered doing EGD now, but from a pulmonary standpoint I do not think she is stable enough with the current pneumonia, therefore would recommend that we just follow the patient, treat with a PPI in case there " "is any peptic ulcer disease."    On 10/21/22 ENT was consulted due to left sided otalgia the day prior which resolved. She also was complaining of cervical adenopathy and sore throat. They did not recommend surgical intervention and felt that adenopathy was likely reactive.     On 10/23/22 ID was consulted. They recommended checking respiratory infection panel and fungal markers.    On 10/23/22 Nephrology was consulted due to worsening creatinine. They noted, "Patient is a noted to have baseline creatinine of 1 as recent as 8/2022 but progressive worsening of creatinine in early October.  On admission patient with creatinine of 1.6 (10/17) with subsequent progression and creatinine of 3.0 at time of consultation (10/23).  Nephrology consulted for acute kidney injury." "Patient with ATN nephritic picture in urine sediment, prot/crea ratio pending. Had hematuria in recent past but in context with positive urine cultures. Now definitely more dysmorphic red cells that wbcs in the urine. 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    On 10/23/22, she was transferred to ICU due to desaturations and respiratory distress. Pulmonologist noted that her respiratory status is too tenuous for bronchoscopy. She was stabilized with non-invasive ventilation and nasal cannula oxygen.      Interval History: Feels well. Rituxan dose 2/4 yesterday    Current Facility-Administered Medications   Medication Frequency    0.9%  NaCl infusion (for blood administration) Q24H PRN    0.9%  NaCl infusion (for blood administration) Q24H PRN    0.9%  NaCl infusion Once    acetaminophen tablet 650 mg Q4H PRN    albuterol-ipratropium 2.5 mg-0.5 mg/3 mL nebulizer solution 3 mL Q4H PRN    aluminum-magnesium hydroxide-simethicone 200-200-20 mg/5 mL suspension 30 mL QID PRN    amLODIPine tablet 10 mg Daily    atovaquone 750 mg/5 mL " oral liquid 1,500 mg Daily    bisacodyL suppository 10 mg Daily PRN    carvediloL tablet 12.5 mg BID    dextrose 10% bolus 125 mL PRN    dextrose 10% bolus 250 mL PRN    glucagon (human recombinant) injection 1 mg PRN    glucose chewable tablet 16 g PRN    glucose chewable tablet 24 g PRN    hydrALAZINE tablet 25 mg Q8H    hydrALAZINE tablet 50 mg Q8H PRN    insulin aspart U-100 pen 1-10 Units Q6H PRN    levothyroxine tablet 25 mcg Before breakfast    lisinopriL tablet 40 mg Daily    melatonin tablet 6 mg Nightly PRN    meperidine injection 25 mg PRN    methocarbamoL tablet 500 mg TID PRN    methylPREDNISolone sodium succinate injection 100 mg Daily    methylPREDNISolone sodium succinate injection 24 mg Daily    naloxone 0.4 mg/mL injection 0.02 mg PRN    ondansetron disintegrating tablet 8 mg Q8H PRN    pantoprazole suspension 40 mg Daily    prochlorperazine injection Soln 5 mg Q6H PRN    QUEtiapine tablet 25 mg QHS    simethicone chewable tablet 80 mg QID PRN    sodium chloride 0.9% 250 mL flush bag 1 time in Clinic/HOD    sodium chloride 0.9% flush 10 mL PRN    sodium chloride 0.9% flush 10 mL PRN    sodium chloride 0.9% flush 5 mL PRN     Facility-Administered Medications Ordered in Other Encounters   Medication Frequency    celecoxib capsule 400 mg Once    fentaNYL 50 mcg/mL injection  mcg PRN    LIDOcaine (PF) 10 mg/ml (1%) injection 10 mg Once PRN    LIDOcaine (PF) 10 mg/ml (1%) injection 10 mg Once    midazolam (VERSED) 1 mg/mL injection 0.5-4 mg PRN    ropivacaine 0.2% [facility] PainPRO Pump infusion 500 ML Continuous     Objective:     Vital Signs (Most Recent):  Temp: 98.4 °F (36.9 °C) (11/04/22 1052)  Pulse: 83 (11/04/22 1145)  Resp: 15 (11/04/22 1052)  BP: (!) 173/75 (11/04/22 1145)  SpO2: (!) 94 % (11/04/22 0809)  O2 Device (Oxygen Therapy): room air (11/04/22 1052)   Vital Signs (24h Range):  Temp:  [97 °F (36.1 °C)-99.1 °F (37.3 °C)] 98.4 °F (36.9 °C)  Pulse:  [78-94] 83  Resp:  [15-23] 15  SpO2:   [91 %-95 %] 94 %  BP: (135-190)/(63-83) 173/75     Weight: 63 kg (138 lb 14.2 oz) (11/03/22 1000)  Body mass index is 26.24 kg/m².  Body surface area is 1.65 meters squared.      Intake/Output Summary (Last 24 hours) at 11/4/2022 1210  Last data filed at 11/4/2022 0805  Gross per 24 hour   Intake 200 ml   Output 500 ml   Net -300 ml       Physical Exam   Constitutional: No distress.   HENT:   Head: Normocephalic and atraumatic.   Nose: No rhinorrhea or nasal congestion.   Mouth/Throat: Oropharynx is clear.   No tenderness to palpation of sinuses. NGT in place   Ears: No tenderness to palpation     Eyes: Pupils are equal, round, and reactive to light.   Cardiovascular: Normal rate and regular rhythm.   No murmur heard.  Pulmonary/Chest: Effort normal and breath sounds normal. No respiratory distress. She has no wheezes.   Abdominal: Soft. Bowel sounds are normal. There is no abdominal tenderness.   Musculoskeletal:         General: No deformity. Normal range of motion.      Cervical back: Normal range of motion.   Neurological: She is alert.   Skin: Skin is warm and dry.   Psychiatric: Affect and judgment normal.     Significant Labs:  All pertinent lab results from the last 24 hours have been reviewed.    Significant Imaging:  Imaging results within the past 24 hours have been reviewed.    Assessment/Plan:     Acute hypoxemic respiratory failure  Patient is a 76 yo F with chronic diastolic heart failure, HTN, OA, who is admitted for respiratory failure. Rheumatology is consulted due to concern for vasculitis. Patient presented with cough and hemoptysis since 9/24/22. She was admitted due to dyspnea and hypoxia on 10/17. She has had Hb drop to 6 this admission requiring blood transfusions. Reviewed her chest imaging which shows progressive disease from 10/14 until now which can be concerning for DAH. This might also explain the Hb drop. No bronch planned for now due to her tenuous respiratory status. GI defers  invasive workup for now because she is not stable enough. She has had increasing creatinine from baseline of 1 to 3.0 on 10/23/22. 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   3)  CHANGES SUGGESTIVE OF FOCAL ACUTE PYELONEPHRITIS.   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     Treating empirically for ANCA vasculitis while awaiting kidney biopsy results:  - s/p IV Solumedrol 1000 mg x3 doses. Now following PEXIVAS steroid taper. Currently on IV Solumedrol 24 mg daily.  - PLEX 10/26, 10/27, 10/29. Next scheduled for 10/30. Total 7 treatments planned.  - Got SLED 10/27. Bedside HD 10/30  - Rituximab 375 mg/m^2 (600 mg) on 10/27 (every 7 day dose 1 of 4)  - Cyclophosphamide 7.5 mg/kg on 10/27 (every 14 day dose 1 of 2)    Recommendations:  Continue steroid taper per PEXIVAS trial as in the chart below. Currently IV Solu-Medrol 24 mg daily (prednisone 30 mg daily equivalent). Started on 10/30  Atovaquone 1500 mg and Protonix daily while on high dose steroids.  Next Rituximab 275 mg/m^2 due 11/10  Next cyclophosphamide 7.5 mg/kg due on November 10  Monitor CBC and CMP in 10-14 days after giving chemotherapy              Thank you for this consult. These are preliminary recommendations. Please follow attending recommendations. Please message with any questions or concerns.         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 cause     Solu-Medrol 24mg IV daily until 11/6/22 decrease to 20mg IV daily for 2 wks using   Pexivas tapering schedule  Rituximab 375mg IV next dose(#3)11/10/22  Cyclophosphamide 750mg/kg IV next dose(#2) 11/10/22  PLEX   # 7  completed 11/3/22  ? avacopan  Atovaquone 1500 mg daily  Pantoprazole  *Will need Evusheld given rituximab  Speech Therapy and ENT f/u for severe dysphagia.     Jason Woods MD  Rheumatology  [phone]

## 2022-11-04 NOTE — SUBJECTIVE & OBJECTIVE
Interval History: NAEON. Patient reports feeling well today, now on RA. Received 7th round of Plex yesterday as well as rituximab. Planning for HD today.     Review of patient's allergies indicates:   Allergen Reactions    Ampicillin     Peaches [peach (prunus persica)] Other (See Comments)     Pt unable to state type of reaction. Information obtained from daughter who states she was informed of allergy from patient.    Penicillins      Other reaction(s): Hives    Sulfa (sulfonamide antibiotics) Rash and Hives     Current Facility-Administered Medications   Medication Frequency    0.9%  NaCl infusion (for blood administration) Q24H PRN    0.9%  NaCl infusion (for blood administration) Q24H PRN    0.9%  NaCl infusion Once    acetaminophen tablet 650 mg Q4H PRN    albuterol-ipratropium 2.5 mg-0.5 mg/3 mL nebulizer solution 3 mL Q4H PRN    aluminum-magnesium hydroxide-simethicone 200-200-20 mg/5 mL suspension 30 mL QID PRN    amLODIPine tablet 10 mg Daily    atovaquone 750 mg/5 mL oral liquid 1,500 mg Daily    bisacodyL suppository 10 mg Daily PRN    carvediloL tablet 12.5 mg BID    dextrose 10% bolus 125 mL PRN    dextrose 10% bolus 250 mL PRN    glucagon (human recombinant) injection 1 mg PRN    glucose chewable tablet 16 g PRN    glucose chewable tablet 24 g PRN    hydrALAZINE tablet 25 mg Q8H    hydrALAZINE tablet 50 mg Q8H PRN    insulin aspart U-100 pen 1-10 Units Q6H PRN    levothyroxine tablet 25 mcg Before breakfast    lisinopriL tablet 40 mg Daily    melatonin tablet 6 mg Nightly PRN    meperidine injection 25 mg PRN    methocarbamoL tablet 500 mg TID PRN    methylPREDNISolone sodium succinate injection 100 mg Daily    methylPREDNISolone sodium succinate injection 24 mg Daily    naloxone 0.4 mg/mL injection 0.02 mg PRN    ondansetron disintegrating tablet 8 mg Q8H PRN    pantoprazole suspension 40 mg Daily    prochlorperazine injection Soln 5 mg Q6H PRN    QUEtiapine tablet 25 mg QHS    simethicone chewable  tablet 80 mg QID PRN    sodium chloride 0.9% 250 mL flush bag 1 time in Clinic/HOD    sodium chloride 0.9% flush 10 mL PRN    sodium chloride 0.9% flush 10 mL PRN    sodium chloride 0.9% flush 5 mL PRN     Facility-Administered Medications Ordered in Other Encounters   Medication Frequency    celecoxib capsule 400 mg Once    fentaNYL 50 mcg/mL injection  mcg PRN    LIDOcaine (PF) 10 mg/ml (1%) injection 10 mg Once PRN    LIDOcaine (PF) 10 mg/ml (1%) injection 10 mg Once    midazolam (VERSED) 1 mg/mL injection 0.5-4 mg PRN    ropivacaine 0.2% [facility] PainPRO Pump infusion 500 ML Continuous       Objective:     Vital Signs (Most Recent):  Temp: 98.7 °F (37.1 °C) (11/04/22 0800)  Pulse: 93 (11/04/22 0809)  Resp: 16 (11/04/22 0800)  BP: (!) 161/71 (11/04/22 0800)  SpO2: (!) 94 % (11/04/22 0809)  O2 Device (Oxygen Therapy): room air (11/04/22 0800)   Vital Signs (24h Range):  Temp:  [97 °F (36.1 °C)-99.1 °F (37.3 °C)] 98.7 °F (37.1 °C)  Pulse:  [] 93  Resp:  [16-30] 16  SpO2:  [91 %-95 %] 94 %  BP: (135-170)/(63-77) 161/71     Weight: 63 kg (138 lb 14.2 oz) (11/03/22 1000)  Body mass index is 26.24 kg/m².  Body surface area is 1.65 meters squared.    I/O last 3 completed shifts:  In: 2991 [Blood:2691; NG/GT:300]  Out: 3869 [Urine:900; Blood:2969]    Physical Exam  Vitals and nursing note reviewed.   Constitutional:       General: She is not in acute distress.     Appearance: She is well-developed. She is ill-appearing. She is not toxic-appearing.      Comments: Patient awake and alert and in no distress   HENT:      Head: Normocephalic and atraumatic.      Mouth/Throat:      Mouth: Mucous membranes are dry.   Eyes:      Conjunctiva/sclera: Conjunctivae normal.   Cardiovascular:      Rate and Rhythm: Normal rate and regular rhythm.      Heart sounds: Normal heart sounds.   Pulmonary:      Effort: Pulmonary effort is normal. No respiratory distress.      Breath sounds: No wheezing or rales.      Comments: Coarse  breath sounds bilaterally  Abdominal:      General: Bowel sounds are normal. There is no distension.      Palpations: Abdomen is soft.      Tenderness: There is no abdominal tenderness.   Musculoskeletal:         General: Swelling present. No tenderness. Normal range of motion.      Cervical back: Normal range of motion and neck supple.      Right lower leg: No edema.      Left lower leg: No edema.   Lymphadenopathy:      Cervical: No cervical adenopathy.   Skin:     General: Skin is warm and dry.      Capillary Refill: Capillary refill takes less than 2 seconds.      Findings: No rash.   Neurological:      General: No focal deficit present.      Mental Status: She is alert and oriented to person, place, and time.       Significant Labs:  CBC:   Recent Labs   Lab 11/04/22  0445   WBC 15.67*   RBC 2.74*   HGB 7.9*   HCT 24.4*      MCV 89   MCH 28.8   MCHC 32.4       CMP:   Recent Labs   Lab 11/04/22  0445   *   CALCIUM 8.9   ALBUMIN 3.4*   PROT 5.5*   *   K 4.0   CO2 26      *   CREATININE 4.5*   ALKPHOS 72   ALT 36   AST 31   BILITOT 0.6       All labs within the past 24 hours have been reviewed.     Significant Imaging:

## 2022-11-04 NOTE — ASSESSMENT & PLAN NOTE
"Patient with baseline creatinine of 1 as recent as August 2022 with progressive worsening beginning in early October 1.3 (10/13)->1.6 (10/17)->3.0 (10/23) despite IV fluids.  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  Patient noted to have new onset proteinuria and hematuria over the last 1 month.  Additionally, would consider ATN in the setting of suspected sepsis from multifocal pneumonia.     Concerns for glomerulonephritis given patient's current clinical picture. Initial urine microscopy demonstrating muddy brown casts with likely acanthocytes noted as well. MITUL positive, proteinase 3 ab weakly positive, MPO strongly positive. Patient s/p high-dose IV solumedrol x3 doses, now on Solumedrol 50mg qd. Underwent kidney bx 10/27. Rheumatology consulted, and patient started on Plex, Ritux/cytoxan. Given continually worsening renal function and uremic encephalopathy, patient underwent SLED without complication on 10/27. Transferred to floor on 10/29. Significantly improved mentation on 10/31. Underwent HD 11/1. Patient also with increasing hypernatremia so added FWF to tube feeds.     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 Second dose Ritux on 11/3. Next dose of cytoxan on 11/10. Will discuss with Rheum regarding maintenance dosing.     Recommendations:  - HD today 11/4  - continue steroid taper per rheum   - strict intake and output  - renally dose medications and avoid nephrotoxins when possible  - replete electrolytes as appropriate  "

## 2022-11-04 NOTE — PT/OT/SLP PROGRESS
Occupational Therapy   Treatment    Name: Kristin Goodman  MRN: [id number]  Admitting Diagnosis:  Microscopic polyangiitis       Recommendations:     Discharge Recommendations: nursing facility, skilled  Discharge Equipment Recommendations:  bedside commode  Barriers to discharge:  None    Assessment:     Kristin Goodman is a 77 y.o. female with a medical diagnosis of Microscopic polyangiitis.  She presents with good participation and motivation. Pt continues to require (A) with all activities & is at risk for falls. Pt with greatly improved alertness, balance & ability to fully participate in therapy on this date. Goals remain appropriate. Performance deficits affecting function are weakness, impaired endurance, impaired self care skills, impaired functional mobility, impaired balance, decreased upper extremity function, decreased lower extremity function, decreased safety awareness, impaired cardiopulmonary response to activity.     Rehab Prognosis:  Good; patient would benefit from acute skilled OT services to address these deficits and reach maximum level of function.       Plan:     Patient to be seen 3 x/week to address the above listed problems via self-care/home management, therapeutic activities, therapeutic exercises, neuromuscular re-education  Plan of Care Expires: 11/20/22  Plan of Care Reviewed with: patient    Subjective   Pt reported that she would like to sit in the chair when asked.  Pain/Comfort:  Pain Rating 1: 0/10  Pain Rating Post-Intervention 1: 0/10    Objective:     Communicated with: RN prior to session.  Patient found supine with telemetry, pulse ox (continuous), NG tube (daughter present during session) upon OT entry to room.    General Precautions: Standard, fall, aspiration, NPO   Orthopedic Precautions:N/A   Braces: N/A  Respiratory Status: Room air     Occupational Performance:     Bed Mobility:    Patient completed Scooting/Bridging with moderate assistance  Patient completed Supine to  Sit with stand by assistance     Functional Mobility/Transfers:  Patient completed Sit <> Stand Transfer with minimum assistance  with  no assistive device from EOB  Patient completed Bed <> Chair Transfer using Step Transfer technique with minimum assistance with no assistive device    Activities of Daily Living:  Toileting: maximal assistance while supine due to BM incontinence      [facility] 6 Click ADL: 11    Treatment & Education:  Pt required SBA-CGA with postural control while seated EOB during activities.  Pt performed AROM exercises with BUE in 3 planes x 10 reps each while seated EOB. Pt had no further questions & when asked whether there were any concerns pt reported none.      Patient left up in chair with all lines intact, call button in reach, RN notified, and daughter present    GOALS:   Multidisciplinary Problems       Occupational Therapy Goals          Problem: Occupational Therapy    Goal Priority Disciplines Outcome Interventions   Occupational Therapy Goal     OT, PT/OT Ongoing, Progressing    Description: Goals to be met by: 11/15     Patient will increase functional independence with ADLs by performing:    UE Dressing with Modified [place].  LE Dressing with Modified [place].  Grooming while standing with Modified [place].  Toileting from toilet with Modified [place] for hygiene and clothing management.   Supine to sit with Modified [place].  Step transfer with Modified [place]  Toilet transfer to toilet with Modified [place].                         Time Tracking:     OT Date of Treatment: 11/04/22  OT Start Time: 0837  OT Stop Time: 0904  OT Total Time (min): 27 min    Billable Minutes:Therapeutic Activity 14  Therapeutic Exercise 13    OT/SARAHI: OT     SARAHI Visit Number: 0    11/4/2022

## 2022-11-04 NOTE — PT/OT/SLP PROGRESS
"Speech Language Pathology Treatment    Patient Name:  Kristin Goodman   MRN:  [id number]  Admitting Diagnosis: Microscopic polyangiitis    Recommendations:                 General Recommendations:  Dysphagia therapy and ongoing f/u with ENT  Diet recommendations:  NPO, Liquid Diet Level: NPO   Aspiration Precautions: Frequent oral care and Strict aspiration precautions   General Precautions: Standard, aspiration, NPO, fall  Communication strategies:  go to room if call light pushed    Subjective     SLP reviewed Pt with RN, RN confirmed Pt YAEL for Dialysis upon initial attempts, cleared for tx upon later attempt  Pt presents lethargic  She explains, "Yes please" re: suctioning      Pain/Comfort:  Pain Rating 1: other (see comments) (Pt did not rate pain)  Location - Side 1: Left  Location - Orientation 1: generalized  Location 1: neck  Pain Addressed 1: Nurse notified    Respiratory Status: Room air    Objective:     Has the patient been evaluated by SLP for swallowing?   Yes  Keep patient NPO? Yes   Current Respiratory Status:   room air      Pt found awake and partially upright in bed with NG in place. RN at bedside. Pt with open mouth breathing poster and mildly strained vocal quality with intermittent wet change in quality.  Oral care provided via toothette moistened with mouthwash in anticipation of PO trials.  Suction via [place]uer  required to clear thick secretions along lingual surface and palate.  RN aware. Pt with decreased sustained alertness and required occasional cues to wake and redirect to task. SLP educated Pt and family (siblings) at bedside on findings from MBSS procedure completed day prior, swallow anatomy, penetration/aspiration risk, role of dysphagia therapy as more tolerable 2/2 pain and ongoing SLP POC. Pt verbalized understanding.  Upon attempts to elicit swallow exercises, patient with lingual pumping and diminished excursion without true initiation of swallow palpated 2 of 2 attempts to " elicit swallow. PO trials deferred 2/2 decreased endurance, fatigue and aspiration risk. Pt remained in position as found with family at bedside upon SLP exit.     Assessment:     Kristin Goodman is a 77 y.o. female with an SLP diagnosis of Dysphagia.  She presents with persistent c/o L neck pain this service day.     Goals:   Multidisciplinary Problems       SLP Goals          Problem: SLP    Goal Priority Disciplines Outcome   SLP Goal     SLP Ongoing, Progressing   Description: Speech Language Pathology Goals  Goals expected to be met by 11/6/22    1. Pt will participate in ongoing assessment of swallow function to determine safest, least restrictive means of nutrition/hydration  2. Educate Pt and family on aspiration precautions and SLP POC  3. Pt will tolerate trials of nectar-thickened liquids w/o overt S/S aspiration, MIN A  4. Pt will complete dysphagia exercises to improve timing of initiation of swallow x5 with 90% accuracy, MIN A                         Plan:     Patient to be seen:  4 x/week   Plan of Care expires:  11/29/22  Plan of Care reviewed with:  patient, sibling   SLP Follow-Up:  Yes       Discharge recommendations:  nursing facility, skilled       Time Tracking:     SLP Treatment Date:   11/04/22  Speech Start Time:  1543  Speech Stop Time:  1602     Speech Total Time (min):  19 min    Billable Minutes: Treatment Swallowing Dysfunction 8 and Self Care/Home Management Training 8    11/04/2022

## 2022-11-04 NOTE — NURSING
Unsuccessfully attempted to replace ng tube. Two attempts were made. Pt did not tolerate well. Pt started to bleed from both nose and outh. NG tube has to be in right nare per the family. Family asked the we try again tomorrow. Notified Dr. Peres.

## 2022-11-04 NOTE — PROGRESS NOTES
[facility] - [facility] ([place] [id number])  Nephrology  Progress Note    Patient Name: Kristin Goodman  MRN: [id number]  Admission Date: 10/17/2022  Hospital Length of Stay: 18 days  Attending Provider: Immanuel Peres MD   Primary Care Physician: Lori Hernandez MD  Principal Problem:Microscopic polyangiitis    Subjective:     HPI: Kristin Goodman is a 77 year old female with hypertension, hypothyroidism, HFpEF who presents for cough, shortness of breath, and weakness.  Patient initially presented to ER on 09/24 with hemoptysis and imaging concerning for multilobar pneumonia at which time she was discharged on a 10 day course of levofloxacin.  Patient initially had some improvement but began having worsening symptoms on 10/14.  Patient describes multiple fevers and progressive shortness of breath.  She continues to have intermittent hemoptysis.  Patient with worsening leukocytosis and limited clinical improvement despite broad-spectrum antibiotics.  She remains on cefepime.  Patient is a noted to have baseline creatinine of 1 as recent as 8/2022 but progressive worsening of creatinine in early October.  On admission patient with creatinine of 1.6 (10/17) with subsequent progression and creatinine of 3.0 at time of consultation (10/23).  Nephrology consulted for acute kidney injury.      Interval History: NAEON. Patient reports feeling well today, now on RA. Received 7th round of Plex yesterday as well as rituximab. Planning for HD today.     Review of patient's allergies indicates:   Allergen Reactions    Ampicillin     Peaches [peach (prunus persica)] Other (See Comments)     Pt unable to state type of reaction. Information obtained from daughter who states she was informed of allergy from patient.    Penicillins      Other reaction(s): Hives    Sulfa (sulfonamide antibiotics) Rash and Hives     Current Facility-Administered Medications   Medication Frequency    0.9%  NaCl infusion (for blood administration)  Q24H PRN    0.9%  NaCl infusion (for blood administration) Q24H PRN    0.9%  NaCl infusion Once    acetaminophen tablet 650 mg Q4H PRN    albuterol-ipratropium 2.5 mg-0.5 mg/3 mL nebulizer solution 3 mL Q4H PRN    aluminum-magnesium hydroxide-simethicone 200-200-20 mg/5 mL suspension 30 mL QID PRN    amLODIPine tablet 10 mg Daily    atovaquone 750 mg/5 mL oral liquid 1,500 mg Daily    bisacodyL suppository 10 mg Daily PRN    carvediloL tablet 12.5 mg BID    dextrose 10% bolus 125 mL PRN    dextrose 10% bolus 250 mL PRN    glucagon (human recombinant) injection 1 mg PRN    glucose chewable tablet 16 g PRN    glucose chewable tablet 24 g PRN    hydrALAZINE tablet 25 mg Q8H    hydrALAZINE tablet 50 mg Q8H PRN    insulin aspart U-100 pen 1-10 Units Q6H PRN    levothyroxine tablet 25 mcg Before breakfast    lisinopriL tablet 40 mg Daily    melatonin tablet 6 mg Nightly PRN    meperidine injection 25 mg PRN    methocarbamoL tablet 500 mg TID PRN    methylPREDNISolone sodium succinate injection 100 mg Daily    methylPREDNISolone sodium succinate injection 24 mg Daily    naloxone 0.4 mg/mL injection 0.02 mg PRN    ondansetron disintegrating tablet 8 mg Q8H PRN    pantoprazole suspension 40 mg Daily    prochlorperazine injection Soln 5 mg Q6H PRN    QUEtiapine tablet 25 mg QHS    simethicone chewable tablet 80 mg QID PRN    sodium chloride 0.9% 250 mL flush bag 1 time in Clinic/HOD    sodium chloride 0.9% flush 10 mL PRN    sodium chloride 0.9% flush 10 mL PRN    sodium chloride 0.9% flush 5 mL PRN     Facility-Administered Medications Ordered in Other Encounters   Medication Frequency    celecoxib capsule 400 mg Once    fentaNYL 50 mcg/mL injection  mcg PRN    LIDOcaine (PF) 10 mg/ml (1%) injection 10 mg Once PRN    LIDOcaine (PF) 10 mg/ml (1%) injection 10 mg Once    midazolam (VERSED) 1 mg/mL injection 0.5-4 mg PRN    ropivacaine 0.2% [facility] PainPRO Pump infusion 500 ML  Continuous       Objective:     Vital Signs (Most Recent):  Temp: 98.7 °F (37.1 °C) (11/04/22 0800)  Pulse: 93 (11/04/22 0809)  Resp: 16 (11/04/22 0800)  BP: (!) 161/71 (11/04/22 0800)  SpO2: (!) 94 % (11/04/22 0809)  O2 Device (Oxygen Therapy): room air (11/04/22 0800)   Vital Signs (24h Range):  Temp:  [97 °F (36.1 °C)-99.1 °F (37.3 °C)] 98.7 °F (37.1 °C)  Pulse:  [] 93  Resp:  [16-30] 16  SpO2:  [91 %-95 %] 94 %  BP: (135-170)/(63-77) 161/71     Weight: 63 kg (138 lb 14.2 oz) (11/03/22 1000)  Body mass index is 26.24 kg/m².  Body surface area is 1.65 meters squared.    I/O last 3 completed shifts:  In: 2991 [Blood:2691; NG/GT:300]  Out: 3869 [Urine:900; Blood:2969]    Physical Exam  Vitals and nursing note reviewed.   Constitutional:       General: She is not in acute distress.     Appearance: She is well-developed. She is ill-appearing. She is not toxic-appearing.      Comments: Patient awake and alert and in no distress   HENT:      Head: Normocephalic and atraumatic.      Mouth/Throat:      Mouth: Mucous membranes are dry.   Eyes:      Conjunctiva/sclera: Conjunctivae normal.   Cardiovascular:      Rate and Rhythm: Normal rate and regular rhythm.      Heart sounds: Normal heart sounds.   Pulmonary:      Effort: Pulmonary effort is normal. No respiratory distress.      Breath sounds: No wheezing or rales.      Comments: Coarse breath sounds bilaterally  Abdominal:      General: Bowel sounds are normal. There is no distension.      Palpations: Abdomen is soft.      Tenderness: There is no abdominal tenderness.   Musculoskeletal:         General: Swelling present. No tenderness. Normal range of motion.      Cervical back: Normal range of motion and neck supple.      Right lower leg: No edema.      Left lower leg: No edema.   Lymphadenopathy:      Cervical: No cervical adenopathy.   Skin:     General: Skin is warm and dry.      Capillary Refill: Capillary refill takes less than 2 seconds.      Findings: No  "rash.   Neurological:      General: No focal deficit present.      Mental Status: She is alert and oriented to person, place, and time.       Significant Labs:  CBC:   Recent Labs   Lab 11/04/22  0445   WBC 15.67*   RBC 2.74*   HGB 7.9*   HCT 24.4*      MCV 89   MCH 28.8   MCHC 32.4       CMP:   Recent Labs   Lab 11/04/22  0445   *   CALCIUM 8.9   ALBUMIN 3.4*   PROT 5.5*   *   K 4.0   CO2 26      *   CREATININE 4.5*   ALKPHOS 72   ALT 36   AST 31   BILITOT 0.6       All labs within the past 24 hours have been reviewed.     Significant Imaging:       Assessment/Plan:     Acute renal failure superimposed on stage 3 chronic kidney disease  Patient with baseline creatinine of 1 as recent as August 2022 with progressive worsening beginning in early October 1.3 (10/13)->1.6 (10/17)->3.0 (10/23) despite IV fluids.  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  Patient noted to have new onset proteinuria and hematuria over the last 1 month.  Additionally, would consider ATN in the setting of suspected sepsis from multifocal pneumonia.     Concerns for glomerulonephritis given patient's current clinical picture. Initial urine microscopy demonstrating muddy brown casts with likely acanthocytes noted as well. MITUL positive, proteinase 3 ab weakly positive, MPO strongly positive. Patient s/p high-dose IV solumedrol x3 doses, now on Solumedrol 50mg qd. Underwent kidney bx 10/27. Rheumatology consulted, and patient started on Plex, Ritux/cytoxan. Given continually worsening renal function and uremic encephalopathy, patient underwent SLED without complication on 10/27. Transferred to floor on 10/29. Significantly improved mentation on 10/31. Underwent HD 11/1. Patient also with increasing hypernatremia so added FWF to tube feeds.     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 Second dose Ritux on 11/3. Next dose of cytoxan on 11/10. Will discuss with Rheum regarding maintenance dosing.     Recommendations:  - HD today 11/4  - continue steroid taper per rheum   - strict intake and output  - renally dose medications and avoid nephrotoxins when possible  - replete electrolytes as appropriate        Thank you for your consult. I will follow-up with patient. Please contact us if you have any additional questions.    Bipin Frias MD  Nephrology  [facility] - Intensive Care ([place] [id number])  "

## 2022-11-04 NOTE — SUBJECTIVE & OBJECTIVE
Interval History: Feels well. Rituxan dose 2/4 yesterday    Current Facility-Administered Medications   Medication Frequency    0.9%  NaCl infusion (for blood administration) Q24H PRN    0.9%  NaCl infusion (for blood administration) Q24H PRN    0.9%  NaCl infusion Once    acetaminophen tablet 650 mg Q4H PRN    albuterol-ipratropium 2.5 mg-0.5 mg/3 mL nebulizer solution 3 mL Q4H PRN    aluminum-magnesium hydroxide-simethicone 200-200-20 mg/5 mL suspension 30 mL QID PRN    amLODIPine tablet 10 mg Daily    atovaquone 750 mg/5 mL oral liquid 1,500 mg Daily    bisacodyL suppository 10 mg Daily PRN    carvediloL tablet 12.5 mg BID    dextrose 10% bolus 125 mL PRN    dextrose 10% bolus 250 mL PRN    glucagon (human recombinant) injection 1 mg PRN    glucose chewable tablet 16 g PRN    glucose chewable tablet 24 g PRN    hydrALAZINE tablet 25 mg Q8H    hydrALAZINE tablet 50 mg Q8H PRN    insulin aspart U-100 pen 1-10 Units Q6H PRN    levothyroxine tablet 25 mcg Before breakfast    lisinopriL tablet 40 mg Daily    melatonin tablet 6 mg Nightly PRN    meperidine injection 25 mg PRN    methocarbamoL tablet 500 mg TID PRN    methylPREDNISolone sodium succinate injection 100 mg Daily    methylPREDNISolone sodium succinate injection 24 mg Daily    naloxone 0.4 mg/mL injection 0.02 mg PRN    ondansetron disintegrating tablet 8 mg Q8H PRN    pantoprazole suspension 40 mg Daily    prochlorperazine injection Soln 5 mg Q6H PRN    QUEtiapine tablet 25 mg QHS    simethicone chewable tablet 80 mg QID PRN    sodium chloride 0.9% 250 mL flush bag 1 time in Clinic/HOD    sodium chloride 0.9% flush 10 mL PRN    sodium chloride 0.9% flush 10 mL PRN    sodium chloride 0.9% flush 5 mL PRN     Facility-Administered Medications Ordered in Other Encounters   Medication Frequency    celecoxib capsule 400 mg Once    fentaNYL 50 mcg/mL injection  mcg PRN    LIDOcaine (PF) 10 mg/ml (1%) injection 10 mg Once PRN    LIDOcaine (PF) 10 mg/ml (1%)  injection 10 mg Once    midazolam (VERSED) 1 mg/mL injection 0.5-4 mg PRN    ropivacaine 0.2% [facility] PainPRO Pump infusion 500 ML Continuous     Objective:     Vital Signs (Most Recent):  Temp: 98.4 °F (36.9 °C) (11/04/22 1052)  Pulse: 83 (11/04/22 1145)  Resp: 15 (11/04/22 1052)  BP: (!) 173/75 (11/04/22 1145)  SpO2: (!) 94 % (11/04/22 0809)  O2 Device (Oxygen Therapy): room air (11/04/22 1052)   Vital Signs (24h Range):  Temp:  [97 °F (36.1 °C)-99.1 °F (37.3 °C)] 98.4 °F (36.9 °C)  Pulse:  [78-94] 83  Resp:  [15-23] 15  SpO2:  [91 %-95 %] 94 %  BP: (135-190)/(63-83) 173/75     Weight: 63 kg (138 lb 14.2 oz) (11/03/22 1000)  Body mass index is 26.24 kg/m².  Body surface area is 1.65 meters squared.      Intake/Output Summary (Last 24 hours) at 11/4/2022 1210  Last data filed at 11/4/2022 0805  Gross per 24 hour   Intake 200 ml   Output 500 ml   Net -300 ml       Physical Exam   Constitutional: No distress.   HENT:   Head: Normocephalic and atraumatic.   Nose: No rhinorrhea or nasal congestion.   Mouth/Throat: Oropharynx is clear.   No tenderness to palpation of sinuses. NGT in place   Ears: No tenderness to palpation     Eyes: Pupils are equal, round, and reactive to light.   Cardiovascular: Normal rate and regular rhythm.   No murmur heard.  Pulmonary/Chest: Effort normal and breath sounds normal. No respiratory distress. She has no wheezes.   Abdominal: Soft. Bowel sounds are normal. There is no abdominal tenderness.   Musculoskeletal:         General: No deformity. Normal range of motion.      Cervical back: Normal range of motion.   Neurological: She is alert.   Skin: Skin is warm and dry.   Psychiatric: Affect and judgment normal.     Significant Labs:  All pertinent lab results from the last 24 hours have been reviewed.    Significant Imaging:  Imaging results within the past 24 hours have been reviewed.

## 2022-11-04 NOTE — AI DETERIORATION ALERT
"RAPID RESPONSE NURSE AI ALERT       AI alert received.    Chart Reviewed: 11/04/2022, 9:22 AM    MRN: [id number]  Bed: 72876/01478 A    Dx: Microscopic polyangiitis    Kristin Goodman has a past medical history of Acute blood loss anemia, Acute hypoxemic respiratory failure, Allergy, Back pain, Chronic diastolic heart failure, Chronic diastolic heart failure, Colon polyp, Disorder of kidney and ureter, H/O Bell's palsy, Helicobacter pylori (H. pylori), HTN (hypertension), Hypothyroid, OA (osteoarthritis), DONAVAN (obstructive sleep apnea), Pneumonia due to other staphylococcus, Pulmonary HTN, Sepsis due to pneumonia, Septic shock, Trouble in sleeping, and Urinary incontinence.    Last VS: BP (!) 161/71 (BP Location: Right arm, Patient Position: Lying)   Pulse 93   Temp 98.7 °F (37.1 °C) (Oral)   Resp 16   Ht 5' 1" (1.549 m)   Wt 63 kg (138 lb 14.2 oz)   SpO2 (!) 94%   BMI 26.24 kg/m²     24H Vital Sign Range:  Temp:  [97 °F (36.1 °C)-99.1 °F (37.3 °C)]   Pulse:  []   Resp:  [16-30]   BP: (135-170)/(63-77)   SpO2:  [91 %-95 %]     Level of Consciousness (AVPU): alert    Recent Labs     11/02/22  0554 11/03/22  0226 11/04/22  0445   WBC 21.91* 15.23* 15.67*   HGB 8.1* 7.5* 7.9*   HCT 24.2* 22.8* 24.4*    140* 158       Recent Labs     11/02/22  0554 11/03/22  0226 11/04/22  0445    147* 149*   K 3.7 3.3* 4.0    105 107   CO2 28 27 26   CREATININE 4.6* 4.1* 4.5*   * 126* 173*   PHOS 5.3* 5.2* 4.8*   MG 1.9 1.7 1.8        No results for input(s): PH, PCO2, PO2, HCO3, POCSATURATED, BE in the last 72 hours.     OXYGEN:  Flow (L/min): 1  Oxygen Concentration (%): 50  O2 Device (Oxygen Therapy): room air    MEWS score: 1    Bedside RNLeidy  contacted. No concerns verbalized at this time. Instructed to call [phone] for further concerns or assistance.    Padma Herman RN        "

## 2022-11-04 NOTE — PLAN OF CARE
Patient not medically ready for DC and skilled placement is recommended an  top follow up for DC readiness and get skilled choices
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
## 2022-11-04 NOTE — PLAN OF CARE
Plan of care reviewed with pt. Pt aaox4. Tube feeds running at goat of 35 ml/hr. Pt went to HD. Pt remains NPO. Speech still following pt and will continue to work with pts swallowing.  NG tube clogged. Will DC NG tube and attempt to restart another one. VSS. Pt remained free of falls, trauma, and injury. Will continue to monitor

## 2022-11-05 PROBLEM — R30.0 DYSURIA: Status: RESOLVED | Noted: 2022-10-30 | Resolved: 2022-11-05

## 2022-11-05 PROBLEM — R65.21 SEPTIC SHOCK: Status: RESOLVED | Noted: 2022-10-27 | Resolved: 2022-11-05

## 2022-11-05 PROBLEM — N17.9 ACUTE RENAL FAILURE SUPERIMPOSED ON STAGE 3 CHRONIC KIDNEY DISEASE: Status: RESOLVED | Noted: 2022-10-18 | Resolved: 2022-11-05

## 2022-11-05 PROBLEM — N18.30 CKD (CHRONIC KIDNEY DISEASE), STAGE III: Status: RESOLVED | Noted: 2019-04-27 | Resolved: 2022-11-05

## 2022-11-05 PROBLEM — A41.9 SEPTIC SHOCK: Status: RESOLVED | Noted: 2022-10-27 | Resolved: 2022-11-05

## 2022-11-05 PROBLEM — R04.2 HEMOPTYSIS: Status: RESOLVED | Noted: 2022-11-05 | Resolved: 2022-11-05

## 2022-11-05 PROBLEM — R04.2 HEMOPTYSIS: Status: ACTIVE | Noted: 2022-11-05

## 2022-11-05 PROBLEM — K92.1 GASTROINTESTINAL HEMORRHAGE WITH MELENA: Status: ACTIVE | Noted: 2022-11-05

## 2022-11-05 PROBLEM — R04.89 DIFFUSE PULMONARY ALVEOLAR HEMORRHAGE: Status: RESOLVED | Noted: 2022-11-05 | Resolved: 2022-11-05

## 2022-11-05 PROBLEM — E87.0 HYPERNATREMIA: Status: RESOLVED | Noted: 2022-11-01 | Resolved: 2022-11-05

## 2022-11-05 PROBLEM — I27.20 PULMONARY HTN: Status: RESOLVED | Noted: 2020-08-31 | Resolved: 2022-11-05

## 2022-11-05 PROBLEM — J18.9 MULTIFOCAL PNEUMONIA: Status: RESOLVED | Noted: 2022-10-17 | Resolved: 2022-11-05

## 2022-11-05 PROBLEM — N18.30 ACUTE RENAL FAILURE SUPERIMPOSED ON STAGE 3 CHRONIC KIDNEY DISEASE: Status: ACTIVE | Noted: 2022-10-26

## 2022-11-05 PROBLEM — R59.0 CERVICAL ADENOPATHY: Status: RESOLVED | Noted: 2022-10-21 | Resolved: 2022-11-05

## 2022-11-05 PROBLEM — R04.89 DIFFUSE PULMONARY ALVEOLAR HEMORRHAGE: Status: ACTIVE | Noted: 2022-11-05

## 2022-11-05 PROBLEM — N18.30 ACUTE RENAL FAILURE SUPERIMPOSED ON STAGE 3 CHRONIC KIDNEY DISEASE: Status: RESOLVED | Noted: 2022-10-18 | Resolved: 2022-11-05

## 2022-11-05 LAB
ABO + RH BLD: NORMAL
ALBUMIN SERPL BCP-MCNC: 3 G/DL (ref 3.5–5.2)
ALP SERPL-CCNC: 59 U/L (ref 55–135)
ALT SERPL W/O P-5'-P-CCNC: 49 U/L (ref 10–44)
ANION GAP SERPL CALC-SCNC: 11 MMOL/L (ref 8–16)
AST SERPL-CCNC: 53 U/L (ref 10–40)
BASOPHILS # BLD AUTO: 0.02 K/UL (ref 0–0.2)
BASOPHILS NFR BLD: 0.1 % (ref 0–1.9)
BILIRUB SERPL-MCNC: 0.6 MG/DL (ref 0.1–1)
BLD GP AB SCN CELLS X3 SERPL QL: NORMAL
BLD PROD TYP BPU: NORMAL
BLOOD UNIT EXPIRATION DATE: NORMAL
BLOOD UNIT TYPE CODE: 7300
BLOOD UNIT TYPE: NORMAL
BUN SERPL-MCNC: 74 MG/DL (ref 8–23)
CALCIUM SERPL-MCNC: 8.1 MG/DL (ref 8.7–10.5)
CHLORIDE SERPL-SCNC: 107 MMOL/L (ref 95–110)
CO2 SERPL-SCNC: 24 MMOL/L (ref 23–29)
CODING SYSTEM: NORMAL
CREAT SERPL-MCNC: 3 MG/DL (ref 0.5–1.4)
DIFFERENTIAL METHOD: ABNORMAL
DISPENSE STATUS: NORMAL
EOSINOPHIL # BLD AUTO: 0 K/UL (ref 0–0.5)
EOSINOPHIL NFR BLD: 0 % (ref 0–8)
ERYTHROCYTE [DISTWIDTH] IN BLOOD BY AUTOMATED COUNT: 16.1 % (ref 11.5–14.5)
EST. GFR  (NO RACE VARIABLE): 15.7 ML/MIN/1.73 M^2
GLUCOSE SERPL-MCNC: 102 MG/DL (ref 70–110)
HCT VFR BLD AUTO: 21.3 % (ref 37–48.5)
HGB BLD-MCNC: 6.9 G/DL (ref 12–16)
IMM GRANULOCYTES # BLD AUTO: 0.16 K/UL (ref 0–0.04)
IMM GRANULOCYTES NFR BLD AUTO: 0.8 % (ref 0–0.5)
LYMPHOCYTES # BLD AUTO: 0.9 K/UL (ref 1–4.8)
LYMPHOCYTES NFR BLD: 4.9 % (ref 18–48)
MAGNESIUM SERPL-MCNC: 1.7 MG/DL (ref 1.6–2.6)
MCH RBC QN AUTO: 28.6 PG (ref 27–31)
MCHC RBC AUTO-ENTMCNC: 32.4 G/DL (ref 32–36)
MCV RBC AUTO: 88 FL (ref 82–98)
MONOCYTES # BLD AUTO: 1.6 K/UL (ref 0.3–1)
MONOCYTES NFR BLD: 8.3 % (ref 4–15)
NEUTROPHILS # BLD AUTO: 16.4 K/UL (ref 1.8–7.7)
NEUTROPHILS NFR BLD: 85.9 % (ref 38–73)
NRBC BLD-RTO: 0 /100 WBC
PHOSPHATE SERPL-MCNC: 5.4 MG/DL (ref 2.7–4.5)
PLATELET # BLD AUTO: 158 K/UL (ref 150–450)
PMV BLD AUTO: 12.7 FL (ref 9.2–12.9)
POCT GLUCOSE: 109 MG/DL (ref 70–110)
POCT GLUCOSE: 114 MG/DL (ref 70–110)
POCT GLUCOSE: 118 MG/DL (ref 70–110)
POTASSIUM SERPL-SCNC: 4.2 MMOL/L (ref 3.5–5.1)
PROT SERPL-MCNC: 5.1 G/DL (ref 6–8.4)
RBC # BLD AUTO: 2.41 M/UL (ref 4–5.4)
SODIUM SERPL-SCNC: 142 MMOL/L (ref 136–145)
TRANS ERYTHROCYTES VOL PATIENT: NORMAL ML
WBC # BLD AUTO: 19.07 K/UL (ref 3.9–12.7)

## 2022-11-05 PROCEDURE — 36415 COLL VENOUS BLD VENIPUNCTURE: CPT

## 2022-11-05 PROCEDURE — 36415 COLL VENOUS BLD VENIPUNCTURE: CPT | Performed by: INTERNAL MEDICINE

## 2022-11-05 PROCEDURE — 84100 ASSAY OF PHOSPHORUS: CPT

## 2022-11-05 PROCEDURE — 99233 PR SUBSEQUENT HOSPITAL CARE,LEVL III: ICD-10-PCS | Mod: ,,, | Performed by: INTERNAL MEDICINE

## 2022-11-05 PROCEDURE — 99231 SBSQ HOSP IP/OBS SF/LOW 25: CPT | Mod: GC,,, | Performed by: INTERNAL MEDICINE

## 2022-11-05 PROCEDURE — 86920 COMPATIBILITY TEST SPIN: CPT | Performed by: INTERNAL MEDICINE

## 2022-11-05 PROCEDURE — 85025 COMPLETE CBC W/AUTO DIFF WBC: CPT

## 2022-11-05 PROCEDURE — C9113 INJ PANTOPRAZOLE SODIUM, VIA: HCPCS | Performed by: INTERNAL MEDICINE

## 2022-11-05 PROCEDURE — 86850 RBC ANTIBODY SCREEN: CPT | Performed by: INTERNAL MEDICINE

## 2022-11-05 PROCEDURE — 63600175 PHARM REV CODE 636 W HCPCS: Performed by: INTERNAL MEDICINE

## 2022-11-05 PROCEDURE — 83735 ASSAY OF MAGNESIUM: CPT

## 2022-11-05 PROCEDURE — 99231 PR SUBSEQUENT HOSPITAL CARE,LEVL I: ICD-10-PCS | Mod: GC,,, | Performed by: INTERNAL MEDICINE

## 2022-11-05 PROCEDURE — 90935 PR HEMODIALYSIS, ONE EVALUATION: ICD-10-PCS | Mod: ,,, | Performed by: INTERNAL MEDICINE

## 2022-11-05 PROCEDURE — 94660 CPAP INITIATION&MGMT: CPT

## 2022-11-05 PROCEDURE — P9021 RED BLOOD CELLS UNIT: HCPCS | Performed by: INTERNAL MEDICINE

## 2022-11-05 PROCEDURE — 94761 N-INVAS EAR/PLS OXIMETRY MLT: CPT

## 2022-11-05 PROCEDURE — 20600001 HC STEP DOWN PRIVATE ROOM

## 2022-11-05 PROCEDURE — 80053 COMPREHEN METABOLIC PANEL: CPT

## 2022-11-05 PROCEDURE — 99233 SBSQ HOSP IP/OBS HIGH 50: CPT | Mod: ,,, | Performed by: INTERNAL MEDICINE

## 2022-11-05 PROCEDURE — 99900035 HC TECH TIME PER 15 MIN (STAT)

## 2022-11-05 PROCEDURE — 90935 HEMODIALYSIS ONE EVALUATION: CPT | Mod: ,,, | Performed by: INTERNAL MEDICINE

## 2022-11-05 RX ORDER — ONDANSETRON 2 MG/ML
4 INJECTION INTRAMUSCULAR; INTRAVENOUS EVERY 8 HOURS PRN
Status: DISCONTINUED | OUTPATIENT
Start: 2022-11-05 | End: 2022-12-13 | Stop reason: HOSPADM

## 2022-11-05 RX ORDER — HYDRALAZINE HYDROCHLORIDE 20 MG/ML
10 INJECTION INTRAMUSCULAR; INTRAVENOUS EVERY 6 HOURS PRN
Status: DISCONTINUED | OUTPATIENT
Start: 2022-11-05 | End: 2022-11-07

## 2022-11-05 RX ORDER — HYDROCODONE BITARTRATE AND ACETAMINOPHEN 500; 5 MG/1; MG/1
TABLET ORAL
Status: DISCONTINUED | OUTPATIENT
Start: 2022-11-05 | End: 2022-12-03

## 2022-11-05 RX ORDER — PANTOPRAZOLE SODIUM 40 MG/10ML
40 INJECTION, POWDER, LYOPHILIZED, FOR SOLUTION INTRAVENOUS EVERY 24 HOURS
Status: DISCONTINUED | OUTPATIENT
Start: 2022-11-05 | End: 2022-11-10

## 2022-11-05 RX ADMIN — METHYLPREDNISOLONE SODIUM SUCCINATE 24 MG: 40 INJECTION, POWDER, FOR SOLUTION INTRAMUSCULAR; INTRAVENOUS at 12:11

## 2022-11-05 RX ADMIN — HYDRALAZINE HYDROCHLORIDE 10 MG: 20 INJECTION, SOLUTION INTRAMUSCULAR; INTRAVENOUS at 08:11

## 2022-11-05 RX ADMIN — PANTOPRAZOLE SODIUM 40 MG: 40 INJECTION, POWDER, FOR SOLUTION INTRAVENOUS at 03:11

## 2022-11-05 NOTE — ASSESSMENT & PLAN NOTE
Nutrition consulted. Most recent weight and BMI monitored-          Malnutrition (Moderate to Severe)  Weight Loss (Malnutrition): 7.5% in 3 months              Measurements:  Wt Readings from Last 1 Encounters:   11/03/22 63 kg (138 lb 14.2 oz)   Body mass index is 26.24 kg/m².    Recommendations: Recommendation/Intervention: 1. Continue current TF regimen of Novasource @ 35 mL/hr - meeting needs. 2. RD to monitor & follow-up.  Goals: Meet % EEN, EPN by RD f/u date    Patient has been screened and assessed by RD. RD will follow patient.

## 2022-11-05 NOTE — PROGRESS NOTES
Hospital Medicine Brief Note    NGT removed unintentionally by patient, painful to reinsert, appreciate RN re-attempt when able. BP >180 but unable to take PO due to severe oropharyngeal dysphagia, noted on MBBS.      Will manage hypertension with hydralazine 10mg IV Q6hrs PRN SBP >180.    Added ondansetron IV for nausea.    Bev Daly MD, MPH, FACP  Senior Physician, Department of Hospital Medicine  [facility]  [address], [place], [place]

## 2022-11-05 NOTE — ASSESSMENT & PLAN NOTE
In the setting of GI bleed with melena  - Evaluated by GI, see GI bleed documentation  - Last transfusion 10/28, Hgb slowly trending down since then  - Hgb 6.9 on 11/5      Plan  - Monitor CBC Daily   - Treat with pRBC transfusion PRN Hgb <7  - qualifies for transfusion on 11/5 with Hgb 6.9, 1u pRBC ordered

## 2022-11-05 NOTE — ASSESSMENT & PLAN NOTE
Patient is a 76 yo F with chronic diastolic heart failure, HTN, OA, who is admitted for respiratory failure. Rheumatology is consulted due to concern for vasculitis. Patient presented with cough and hemoptysis since 9/24/22. She was admitted due to dyspnea and hypoxia on 10/17. She has had Hb drop to 6 this admission requiring blood transfusions. Reviewed her chest imaging which shows progressive disease from 10/14 until now which can be concerning for DAH. This might also explain the Hb drop. No bronch planned for now due to her tenuous respiratory status. GI defers invasive workup for now because she is not stable enough. She has had increasing creatinine from baseline of 1 to 3.0 on 10/23/22. 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   3)  CHANGES SUGGESTIVE OF FOCAL ACUTE PYELONEPHRITIS.   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     Treatment to date  - s/p IV Solumedrol 1000 mg x3 doses. Now following PEXIVAS steroid taper. Currently on IV Solumedrol 24 mg daily.  - PLEX 10/26, 10/27, 10/29, 10/30, 11/1, 11/2, 11/3 (prior to Rituxan)  - Got SLED 10/27. Bedside HD 10/30  - Rituximab 375 mg/m^2 (600 mg) on 10/27 (every 7 day dose 1 of 4), 11/3 (dose 2 of 4)  - Cyclophosphamide 7.5 mg/kg on 10/27 (every 14 day dose 1 of 2)    Recommendations:  1. Continue steroid taper per PEXIVAS trial as in the chart below. Currently IV Solu-Medrol 24 mg daily  (prednisone 30 mg daily equivalent). Started on 10/30. Will start Solu-Medrol 20 mg IV on 11/6 for total of 14 days  2. Atovaquone 1500 mg and Protonix daily while on high dose steroids.  3. Next Rituximab 275 mg/m^2 due 11/10  4. Next cyclophosphamide 7.5 mg/kg due on November 10  5. Monitor CBC and CMP in 10-14 days after giving chemotherapy

## 2022-11-05 NOTE — SUBJECTIVE & OBJECTIVE
Interval History: Evaluated at the bedside this afternoon, no family present. NGT out due to bleeding. Discussed with Charge RN and bedside RN, will plan for replacement tomorrow and if unsuccessful at the bedside, then will consult GI for consideration of endoscopy guided placement. Discussed results of CT scan with patient, including prior stroke, no acute changes to explain dysphagia. She is motivated to rehab to improve her swallow ability and strength. Labs reviewed, Hgb <7, transfusion ordered. Did have nosebleed after NG tube yesterday which is likely source of this drop.     Hypertensive this AM, managed with IV hydralazine. All medications reviewed for transition to IV if needed. Discussed her extensive hospital course including respiratory failure, which is now resolved.     Discussed care with Rheumatology colleagues, advice and management greatly appreciated.         Review of Systems   Constitutional:  Positive for fatigue. Negative for activity change and appetite change.   HENT:  Positive for nosebleeds and trouble swallowing.    Respiratory:  Negative for cough and choking.    Cardiovascular:  Negative for chest pain, palpitations and leg swelling.   Gastrointestinal:  Negative for constipation and diarrhea.   Genitourinary:  Positive for decreased urine volume. Negative for urgency.   Neurological:  Positive for facial asymmetry.   Objective:     Vital Signs (Most Recent):  Temp: 98.6 °F (37 °C) (11/05/22 0820)  Pulse: 79 (11/05/22 1122)  Resp: 18 (11/05/22 0405)  BP: (!) 162/71 (11/05/22 0820)  SpO2: 97 % (11/05/22 0820)   Vital Signs (24h Range):  Temp:  [98.1 °F (36.7 °C)-98.8 °F (37.1 °C)] 98.6 °F (37 °C)  Pulse:  [66-83] 79  Resp:  [16-19] 18  SpO2:  [92 %-98 %] 97 %  BP: ()/(50-75) 162/71     Weight: 63 kg (138 lb 14.2 oz)  Body mass index is 26.24 kg/m².    Intake/Output Summary (Last 24 hours) at 11/5/2022 1127  Last data filed at 11/4/2022 1409  Gross per 24 hour   Intake 320 ml    Output 620 ml   Net -300 ml      Physical Exam  Constitutional:       General: She is not in acute distress.     Appearance: She is normal weight. She is not toxic-appearing.   HENT:      Head: Normocephalic.      Nose: Nose normal.      Comments: No overt nasal bleeding or crusted blood on exam     Mouth/Throat:      Mouth: Mucous membranes are moist.      Pharynx: Oropharynx is clear.   Cardiovascular:      Rate and Rhythm: Normal rate and regular rhythm.      Heart sounds: Murmur (faint flow murmur, systolic) heard.   Pulmonary:      Effort: Pulmonary effort is normal.      Breath sounds: Normal breath sounds.   Abdominal:      General: Abdomen is flat. Bowel sounds are normal. There is no distension.   Musculoskeletal:      Cervical back: Normal range of motion.   Skin:     General: Skin is warm and dry.      Capillary Refill: Capillary refill takes less than 2 seconds.      Coloration: Skin is not jaundiced.      Findings: No rash.      Comments: LIJ trialysis in place, clean dry dressing noted with Biopatch present  L midline in place, clean dry dressing with Biopatch in place   Neurological:      Mental Status: She is alert. Mental status is at baseline.   Psychiatric:         Mood and Affect: Mood normal.         Thought Content: Thought content normal.       Significant Labs: All pertinent labs within the past 24 hours have been reviewed.    Significant Imaging: I have reviewed all pertinent imaging results/findings within the past 24 hours.

## 2022-11-05 NOTE — SUBJECTIVE & OBJECTIVE
Interval History:   Patient lying in bed, no acute distress. Family at bedside. No acute events overnight. Patient had HD today without complications. MBSS showed global weakness in swallowing as well as aspiration with thin liquids. ENT consulted for severe dysphagia. PT/OT recommend SNF. Patient continuing IV steroids and completed PLEX on 11/3.       Review of Systems   Constitutional:  Positive for activity change and fatigue. Negative for chills and fever.   HENT:  Positive for trouble swallowing. Negative for congestion.    Eyes:  Negative for visual disturbance.   Respiratory:  Negative for cough and shortness of breath.    Cardiovascular:  Negative for chest pain and leg swelling.   Gastrointestinal:  Negative for abdominal distention, abdominal pain, nausea and vomiting.   Genitourinary:  Negative for difficulty urinating and dysuria.   Musculoskeletal:  Negative for back pain and myalgias.   Skin:  Negative for rash and wound.   Neurological:  Positive for weakness. Negative for dizziness and light-headedness.   Hematological:  Negative for adenopathy. Does not bruise/bleed easily.   Psychiatric/Behavioral:  Negative for confusion and sleep disturbance.    Objective:     Vital Signs (Most Recent):  Temp: 98.6 °F (37 °C) (11/05/22 0820)  Pulse: 78 (11/05/22 0820)  Resp: 18 (11/05/22 0405)  BP: (!) 162/71 (11/05/22 0820)  SpO2: 97 % (11/05/22 0820) Vital Signs (24h Range):  Temp:  [98.1 °F (36.7 °C)-98.8 °F (37.1 °C)] 98.6 °F (37 °C)  Pulse:  [66-83] 78  Resp:  [15-19] 18  SpO2:  [92 %-98 %] 97 %  BP: ()/(50-83) 162/71     Weight: 63 kg (138 lb 14.2 oz)  Body mass index is 26.24 kg/m².    Intake/Output Summary (Last 24 hours) at 11/5/2022 1013  Last data filed at 11/4/2022 1409  Gross per 24 hour   Intake 320 ml   Output 620 ml   Net -300 ml        Physical Exam  Vitals and nursing note reviewed.   Constitutional:       General: She is not in acute distress.     Appearance: She is well-developed. She  is ill-appearing. She is not toxic-appearing.      Comments: Soft-spoken    HENT:      Head: Normocephalic and atraumatic.      Nose:      Comments: NG tube     Mouth/Throat:      Mouth: Mucous membranes are dry.   Eyes:      Conjunctiva/sclera: Conjunctivae normal.   Cardiovascular:      Rate and Rhythm: Normal rate and regular rhythm.      Heart sounds: Normal heart sounds.   Pulmonary:      Effort: Pulmonary effort is normal. No respiratory distress.      Breath sounds: No wheezing or rales.   Abdominal:      General: Bowel sounds are normal. There is no distension.      Palpations: Abdomen is soft.      Tenderness: There is no abdominal tenderness.   Musculoskeletal:         General: No tenderness. Normal range of motion.      Cervical back: Neck supple.      Right lower leg: No edema.      Left lower leg: No edema.   Lymphadenopathy:      Cervical: No cervical adenopathy.   Skin:     General: Skin is warm and dry.      Findings: No rash.   Neurological:      General: No focal deficit present.      Mental Status: She is alert and oriented to person, place, and time.   Psychiatric:         Mood and Affect: Mood normal.       Significant Labs: CBC:   Recent Labs   Lab 11/04/22 0445 11/05/22 0359   WBC 15.67* 19.07*   HGB 7.9* 6.9*   HCT 24.4* 21.3*    158       CMP:   Recent Labs   Lab 11/04/22 0445 11/05/22  0359   * 142   K 4.0 4.2    107   CO2 26 24   * 102   * 74*   CREATININE 4.5* 3.0*   CALCIUM 8.9 8.1*   PROT 5.5* 5.1*   ALBUMIN 3.4* 3.0*   BILITOT 0.6 0.6   ALKPHOS 72 59   AST 31 53*   ALT 36 49*   ANIONGAP 16 11         Significant Imaging: I have reviewed all pertinent imaging results/findings within the past 24 hours.

## 2022-11-05 NOTE — PROGRESS NOTES
[facility] - [facility] ([address] Medicine  Progress Note    Patient Name: Kristin Goodman  MRN: [id number]  Patient Class: IP- Inpatient   Admission Date: 10/17/2022  Length of Stay: 19 days  Attending Physician: Immanuel Peres MD  Primary Care Provider: Lori Hernandez MD        Subjective:     Principal Problem:Microscopic polyangiitis        HPI:  Kristin Goodman is a 77 y.o. female with a past medical history of HTN, hypothyroidism, HFpEF, and osteoarthritis of the arm who has presented to the ED for cough, SOB, and weakness. Daughter is present at the bedside. Patient presented to the ED on 9/24 with hemoptysis, CT and CXR showed high suspicion for multilobar pneumonia. Patient was discharged with a 10-day course of levofloxacin and an albuterol inhaler. Patient followed up with her PCP on 10/4 with slight improvement in symptoms and went back to work. Patient continued to have worsening symptoms and presented to the ED again on 10/14 for evaluation and no interventions were done. Patient endorses fevers over the last few days up to 102.4 F with progressive SOB. She endorses hemoptysis with moderate amount of blood, generalized weakness, productive cough, SOB, and loss of appetite. Denies chest pain, nausea, vomiting, abdominal pain, or urinary changes.    ED: hypertensive up to 219/93 and tachycardic up to 117. Oxygen saturation on 92% on RA, placed on 5L NC with sats >97%. CBC remarkable for leukocytosis of 16.03 and Hb 7.7. K 3.3. Cr 1.6, baseline ~1.0. . Troponin 0.061. COVID and flu negative. EKG NSR. CT chest with contrast pending at time of admission. Given home amlodipine and lisinopril. Given 500mL NS, IV azithromycin, cefepime and vanc.       Overview/Hospital Course:  Initially admitted to hospital medicine, but stepped up to ICU due to hemoptysis. She has completed a course of Abx for PNA. Her presentation was concerning for a vasculitis and Rheumatology and nephrology  consulted. Serologic testing revealed microscopic polyangitis. Renal Biopsy done, pending. She has had plex, steroids, rituximab, cyclophosphamide, and CRRT. Nephro plans to continue HD. Tapering her steroids per rheum recs. Doing well on 2L NC. Stable for step down.       Interval History:   Patient lying in bed, no acute distress. Family at bedside. No acute events overnight. Patient had HD today without complications. MBSS showed global weakness in swallowing as well as aspiration with thin liquids. ENT consulted for severe dysphagia. PT/OT recommend SNF. Patient continuing IV steroids and completed PLEX on 11/3.       Review of Systems   Constitutional:  Positive for activity change and fatigue. Negative for chills and fever.   HENT:  Positive for trouble swallowing. Negative for congestion.    Eyes:  Negative for visual disturbance.   Respiratory:  Negative for cough and shortness of breath.    Cardiovascular:  Negative for chest pain and leg swelling.   Gastrointestinal:  Negative for abdominal distention, abdominal pain, nausea and vomiting.   Genitourinary:  Negative for difficulty urinating and dysuria.   Musculoskeletal:  Negative for back pain and myalgias.   Skin:  Negative for rash and wound.   Neurological:  Positive for weakness. Negative for dizziness and light-headedness.   Hematological:  Negative for adenopathy. Does not bruise/bleed easily.   Psychiatric/Behavioral:  Negative for confusion and sleep disturbance.    Objective:     Vital Signs (Most Recent):  Temp: 98.6 °F (37 °C) (11/05/22 0820)  Pulse: 78 (11/05/22 0820)  Resp: 18 (11/05/22 0405)  BP: (!) 162/71 (11/05/22 0820)  SpO2: 97 % (11/05/22 0820) Vital Signs (24h Range):  Temp:  [98.1 °F (36.7 °C)-98.8 °F (37.1 °C)] 98.6 °F (37 °C)  Pulse:  [66-83] 78  Resp:  [15-19] 18  SpO2:  [92 %-98 %] 97 %  BP: ()/(50-83) 162/71     Weight: 63 kg (138 lb 14.2 oz)  Body mass index is 26.24 kg/m².    Intake/Output Summary (Last 24 hours) at  11/5/2022 1013  Last data filed at 11/4/2022 1409  Gross per 24 hour   Intake 320 ml   Output 620 ml   Net -300 ml        Physical Exam  Vitals and nursing note reviewed.   Constitutional:       General: She is not in acute distress.     Appearance: She is well-developed. She is ill-appearing. She is not toxic-appearing.      Comments: Soft-spoken    HENT:      Head: Normocephalic and atraumatic.      Nose:      Comments: NG tube     Mouth/Throat:      Mouth: Mucous membranes are dry.   Eyes:      Conjunctiva/sclera: Conjunctivae normal.   Cardiovascular:      Rate and Rhythm: Normal rate and regular rhythm.      Heart sounds: Normal heart sounds.   Pulmonary:      Effort: Pulmonary effort is normal. No respiratory distress.      Breath sounds: No wheezing or rales.   Abdominal:      General: Bowel sounds are normal. There is no distension.      Palpations: Abdomen is soft.      Tenderness: There is no abdominal tenderness.   Musculoskeletal:         General: No tenderness. Normal range of motion.      Cervical back: Neck supple.      Right lower leg: No edema.      Left lower leg: No edema.   Lymphadenopathy:      Cervical: No cervical adenopathy.   Skin:     General: Skin is warm and dry.      Findings: No rash.   Neurological:      General: No focal deficit present.      Mental Status: She is alert and oriented to person, place, and time.   Psychiatric:         Mood and Affect: Mood normal.       Significant Labs: CBC:   Recent Labs   Lab 11/04/22 0445 11/05/22  0359   WBC 15.67* 19.07*   HGB 7.9* 6.9*   HCT 24.4* 21.3*    158       CMP:   Recent Labs   Lab 11/04/22 0445 11/05/22  0359   * 142   K 4.0 4.2    107   CO2 26 24   * 102   * 74*   CREATININE 4.5* 3.0*   CALCIUM 8.9 8.1*   PROT 5.5* 5.1*   ALBUMIN 3.4* 3.0*   BILITOT 0.6 0.6   ALKPHOS 72 59   AST 31 53*   ALT 36 49*   ANIONGAP 16 11         Significant Imaging: I have reviewed all pertinent imaging results/findings  within the past 24 hours.      Assessment/Plan:      * Microscopic polyangiitis  As of 10/26/22 strongly positive MPO Ab (in contrast to borderline positive PR3), consistent with clinical picture of microscopic polyangiitis with pulmonary, and renal involvement. Trialysis catheter placed overnight with plans for plasmapheresis early this AM. Pt tolerated procedure well despite multiple attempts to place line. Went for IR Renal biopsy this AM. Tolerated procedure well. Endorsing burning at szymanski insertion site. Continuing Azithromycin and Cefepime. Rheumatology planning to start rituximab and cyclophosphamide.      - Nephrology consulted  - Transfusion medicine consulted  - Rheumatology consulted  - Continue Plasmapheresis w/ plans for 7 treatments over 14-day period  - Rituximab and cyclophosphamide initiated 10/28.   -- Next Rituximab 275 mg/m^2 due on November 3  -- Next cyclophosphamide 7.5 mg/kg due on November 10          - s/p 7 days of high dose steroids, tapering per rheumatology's recs  -- IV solumedrol 24mg daily x7 days  - Protonix daily  - PJP prophylaxis with atovaquone via NG    Dysphagia  SLP following  MBSS showing global weakness in swallowing as well as aspiration with thin liquids.   ENT consulted. Followup recs      Hypernatremia  Adjust free water flushes as indicated well patient has NGT for tube feeds      Dysuria  - UA 10/30 not consistent with urinary tract infection  - Unable to take Pyridium due to renal function      Moderate malnutrition  Nutrition consulted. Most recent weight and BMI monitored-          Malnutrition (Moderate to Severe)  Weight Loss (Malnutrition): 7.5% in 3 months              Measurements:  Wt Readings from Last 1 Encounters:   10/27/22 63.5 kg (139 lb 15.9 oz)   Body mass index is 26.45 kg/m².    Recommendations: Recommendation/Intervention: 1. Pending NGT placement. When able, initiate TFs. Rec'd Novasource @ 35 mL/hr to provide 1680 kcals, 76 g of protein, 602 mL  fluid. 2. RD to monitor & follow-up.  Goals: Meet % EEN, EPN by RD f/u date    Patient has been screened and assessed by RD. RD will follow patient.      Septic shock  See above    Acute renal failure  See microscopic polyangitis    Acute hypoxemic respiratory failure  Microscopic polyangitis  Multifocal pneumonia  Hemoptysis  78 yo PMHx HTN, hypothyroid, HFpEF, OA admitted for fevers and respiratory symptoms, treated while on Hospital Medicine for multifocal PNA. Course complicated by GIB bleed (no EGD yet d/t PNA), hyponatremia, ATN. MICU consulted for rapidly increasing oxygen requirement (4L NC to BiPAP 15/10 50%), respiratory distress/work of breathing, somnolence. Nephrology consulted for ATN, concerns for possible nephritic disease as  behind ATN d/t acanthrocytes. Reports of scant hemoptysis by nursing staff 10/21, 10/22.     Imaging: CXR: Diffuse bilateral airspace infiltrates w/ c/f alveolar fillings; CT chest wc 10/17 with bl perihilar dense consolidations w/ peripheral patcy areas of GGO, BL PNA, less c/f cardiogenic pulmonary edema.  Labs: WBC uptrending 28.97, Hgb lowest 6.0 (s/p 2 u PRBC), continues to downtrend approx 1 point / day. sCr uptrending, (baseline 1.0-1.2). .        No results for input(s): PH, PCO2, PO2, HCO3, POCSATURATED, BE in the last 72 hours.  Etiology: Microscopic polyangitis likely. Concerns for possible PE as patient was not on thrombotic ppx s/o GIB. Incomplete I/Os charted, though reports of low UOP also renders pulmonary congestion edema as a possibility. Less c/f acute progression of multifocal PNA as adequate ABx coverage and no new fevers recently.     - US DVT BLE unremarkable  - Consideration for urgent BAL +/- repeat chest imaging (last done 10/17)  - Rheumatology consulted  -- Continue Rituximab, cyclophosphamide  -- IV solumedrol per Rheumatology  -- PJP prophylaxis  - Nephrology consulted  -- iHD nephrology  - Transfusion medicine  -- PLEX started  10/26, plans for 7 cycles  - Neuro checks  - Wean supplemental O2 as tolerated  - RESOLVED    Cervical adenopathy  See above      Lymphadenopathy of head and neck  - Has bilateral neck gland swelling with tenderness,consulted ENT  - No surgical intervention indicated   - Adenopathy likely reactive in nature   - Recommend further workup if it does not resolve within next 2 weeks     Acute blood loss anemia  Starting having melena,HH is dropped,transfused 2 PRBC,HH is improved,started on Protonix gtt and consulted GI.per GI not candidate for EGD at this time,duo to pneumonia and hypoxia,will be monitored.        Acute renal failure superimposed on stage 3 chronic kidney disease  Patient w/o CKD presenting with WILFREDO with rapidly increasing sCr (baseline sCr 1.0-1.2). New onset proteinuria/hematuria in last 30 days. .  DDx: ATN vs GN.       - Renal Bx done, f/u pathology  - High-dose steroids completed, tapering per rheumatology recs.  - Rituximab and cytoxan per rheum recs.  - Undergoing PLEX, see transfusion medicine recs  - iHD per nephrology  - Trend sCr  - Strict I&Os  - Avoid nephrotoxic agents  - Renally adjust medications  - Monitor for renal recovery    Multifocal pneumonia  See above      Chronic diastolic heart failure  Pulmonary Hypertension   TTE (10/2022) EF 65%, G1DD  Home meds: Lisinopril, Toprol     - Volume control plan per Acute Hypoxemic Respiratory Failure A/P  - Daily weights (standing if tolerated)  - Strict I/Os  - Fluid restriction 1.5 day  - Cardiac diet w/ 2 g Na restriction      Pulmonary HTN  See above      CKD (chronic kidney disease), stage III  See microscopic polyangitis    HTN (hypertension)  - Continue to titrate antihypertensives  - Continue Amlodipine 10mg and Lisinopril 40mg  - Coreg 12.5mg, titrate as HR tolerates  - Hydralazine 25mg every 8 hours  - PRN hydralazine      Hypothyroid  - Continue synthroid 25 mcg  - TSH 0.585 10/27/22      VTE Risk Mitigation (From admission,  onward)         Ordered     Place sequential compression device  Until discontinued         10/25/22 1731     IP VTE HIGH RISK PATIENT  Once         10/17/22 1354     Reason for No Pharmacological VTE Prophylaxis  Once        Question:  Reasons:  Answer:  Risk of Bleeding    10/17/22 1354                Discharge Planning   ANTHONY: 11/7/2022     Code Status: Full Code   Is the patient medically ready for discharge?: No    Reason for patient still in hospital (select all that apply): Patient unstable, Patient trending condition, Laboratory test, Treatment and Consult recommendations  Discharge Plan A: Home with family          Time spent in care of patient: > 35 minutes           Immanuel Peres MD  Department of Hospital Medicine   [facility] - Intensive Care ([place]-14)

## 2022-11-05 NOTE — ASSESSMENT & PLAN NOTE
Multifocal pneumonia  Hemoptysis  Dyspnea  Diffuse Alveolar Hemorrahge  In the setting of a new diagnosis of microscopic polyangiitis, presented with fevers, dyspnea,.  - Chest imaging was consistent with diffuse alveolar hemorrhage.  CT chest wc 10/17 with bl perihilar dense consolidations w/ peripheral patcy areas of GGO, BL PNA, less c/f cardiogenic pulmonary edema.  - Patient upgraded to Medical ICU 10/23/22 with abrupt respiratory decline requiring NIPPV, improved with high dose IV steroids, plasmapheresis, emergent hemodialysis.   - Unable to perform bronchoscopy in the Medical ICU due to tenuous respiratory status  - As of 11/6, hypoxemia has significantly improved    Plan:  - continue management of hypoxemia with supplemental oxygen  - continue management of underlying triggers with steroid and immunosuppressants  - Wean supplemental O2 as tolerated  - incentive spirometry and respiratory hygiene

## 2022-11-05 NOTE — ASSESSMENT & PLAN NOTE
SLP following  MBSS showing global weakness in swallowing as well as aspiration with thin liquids.   ENT consulted. Followup recs  NG tube clogged and removed 11/4, unable to be reinserted due to pain    Plan  - Essential medications transitioned temporarily to IV formulation on 11/5  - Appreciate RN re-attempt when patient able to tolerate NG tube placement  - If unable to tolerate NG tube re-attempt, will need to consult Gastroenterology for consideration of endoscopic placement.    - Discussed case with Rheumatology team, they are concerned that this dysphagia may have been from prior stroke, requesting imaging for evaluation - have graciously ordered head imaging on behalf of this patient - will review results with consultants and patient/family once completed.

## 2022-11-05 NOTE — PROGRESS NOTES
[facility] ([address] Medicine  Progress Note    Patient Name: Kristin Goodman  MRN: [id number]  Patient Class: IP- Inpatient   Admission Date: 10/17/2022  Length of Stay: 19 days  Attending Physician: Bev Daly MD  Primary Care Provider: Lori Hernandez MD        Subjective:     Principal Problem:Microscopic polyangiitis        HPI:  Kristin Goodman is a 77 y.o. female with a past medical history of HTN, hypothyroidism, HFpEF, and osteoarthritis of the arm who has presented to the ED for cough, SOB, and weakness. Daughter is present at the bedside. Patient presented to the ED on 9/24 with hemoptysis, CT and CXR showed high suspicion for multilobar pneumonia. Patient was discharged with a 10-day course of levofloxacin and an albuterol inhaler. Patient followed up with her PCP on 10/4 with slight improvement in symptoms and went back to work. Patient continued to have worsening symptoms and presented to the ED again on 10/14 for evaluation and no interventions were done. Patient endorses fevers over the last few days up to 102.4 F with progressive SOB. She endorses hemoptysis with moderate amount of blood, generalized weakness, productive cough, SOB, and loss of appetite. Denies chest pain, nausea, vomiting, abdominal pain, or urinary changes.    ED: hypertensive up to 219/93 and tachycardic up to 117. Oxygen saturation on 92% on RA, placed on 5L NC with sats >97%. CBC remarkable for leukocytosis of 16.03 and Hb 7.7. K 3.3. Cr 1.6, baseline ~1.0. . Troponin 0.061. COVID and flu negative. EKG NSR. CT chest with contrast pending at time of admission. Given home amlodipine and lisinopril. Given 500mL NS, IV azithromycin, cefepime and vanc.       Overview/Hospital Course:  Ms. Goodman was admitted to Hospital Medicine for management of multifocal pneumonia and acute hypoxemic respiratory failure after presenting to the ED with fevers, cough, hemoptysis, and dyspnea. She required  escalation to the Medical ICU due to abrupt decline in her respiratory status, associated with hemoptysis and requiring NIPPV. CT chest showed bilateral patchy ground glass opacities. Labs additionally were notable for acute renal failure on CKD3. Pulmonology was consulted while under the care of [facility] Medicine and felt this picture was consistent with diffuse alveolar hemorrhage.     Her workup revealed a strongly positive MPO Ab consistent with clinical picture of microscopic polyangiitis with pulmonary and renal involvement. She underwent Trialysis catheter placement 10/25/2022 in the Medical ICU. 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     Rheumatology was consulted, extensive workup revealed MITUL + 1:2560 homogenous, +dsDNA, normal complements, PR3 1.2 (slight +),  (+), cANCA + 1:80, pANCA neg, GBMAb neg, trace cryos. Due to concern for MPA, she was started on pulse dose IV methylprednisolone 1000mg for 3 days, and plasmapheresis under the guidance of Transfusion Medicine. She remains on a steroid taper and has completed PLEX. She additionally received rituximab and cyclophosphamide. OI prophylaxis was provided with atovaquone due to a sulfa allergy.     Nephrology was consulted for evaluation of acute renal failure in the setting of MPA. She did require SLED in the ICU for renal clearance and remains on intermittent hemodialysis.     Her acute hypoxemic respiratory failure improved and she was weaned off of supplemental oxygen. She stepped down to Hospital Medicine 10/28.     She underwent MBBS for evaluation of dysphagia, which showed global delayed initiation of swallow and aspiration with thin liquids. Due to concern for possible prior stroke, head imaging  was completed. She is NPO with NG tube. ENT was consulted for evaluation of dysphagia and cervical adenopathy.     Her other chronic medical conditions including hypothyroidism, diastolic CHF, and hypertension were managed with her home medications, with dose adjustments as needed.         Interval History: Evaluated at the bedside this afternoon, no family present. NGT out due to bleeding. Discussed with Charge RN and bedside RN, will plan for replacement tomorrow and if unsuccessful at the bedside, then will consult GI for consideration of endoscopy guided placement. Discussed results of CT scan with patient, including prior stroke, no acute changes to explain dysphagia. She is motivated to rehab to improve her swallow ability and strength. Labs reviewed, Hgb <7, transfusion ordered. Did have nosebleed after NG tube yesterday which is likely source of this drop.     Hypertensive this AM, managed with IV hydralazine. All medications reviewed for transition to IV if needed. Discussed her extensive hospital course including respiratory failure, which is now resolved.     Discussed care with Rheumatology colleagues, advice and management greatly appreciated.         Review of Systems   Constitutional:  Positive for fatigue. Negative for activity change and appetite change.   HENT:  Positive for nosebleeds and trouble swallowing.    Respiratory:  Negative for cough and choking.    Cardiovascular:  Negative for chest pain, palpitations and leg swelling.   Gastrointestinal:  Negative for constipation and diarrhea.   Genitourinary:  Positive for decreased urine volume. Negative for urgency.   Neurological:  Positive for facial asymmetry.   Objective:     Vital Signs (Most Recent):  Temp: 98.6 °F (37 °C) (11/05/22 0820)  Pulse: 79 (11/05/22 1122)  Resp: 18 (11/05/22 0405)  BP: (!) 162/71 (11/05/22 0820)  SpO2: 97 % (11/05/22 0820)   Vital Signs (24h Range):  Temp:  [98.1 °F (36.7 °C)-98.8 °F (37.1 °C)] 98.6 °F (37  °C)  Pulse:  [66-83] 79  Resp:  [16-19] 18  SpO2:  [92 %-98 %] 97 %  BP: ()/(50-75) 162/71     Weight: 63 kg (138 lb 14.2 oz)  Body mass index is 26.24 kg/m².    Intake/Output Summary (Last 24 hours) at 11/5/2022 1127  Last data filed at 11/4/2022 1409  Gross per 24 hour   Intake 320 ml   Output 620 ml   Net -300 ml      Physical Exam  Constitutional:       General: She is not in acute distress.     Appearance: She is normal weight. She is not toxic-appearing.   HENT:      Head: Normocephalic.      Nose: Nose normal.      Comments: No overt nasal bleeding or crusted blood on exam     Mouth/Throat:      Mouth: Mucous membranes are moist.      Pharynx: Oropharynx is clear.   Cardiovascular:      Rate and Rhythm: Normal rate and regular rhythm.      Heart sounds: Murmur (faint flow murmur, systolic) heard.   Pulmonary:      Effort: Pulmonary effort is normal.      Breath sounds: Normal breath sounds.   Abdominal:      General: Abdomen is flat. Bowel sounds are normal. There is no distension.   Musculoskeletal:      Cervical back: Normal range of motion.   Skin:     General: Skin is warm and dry.      Capillary Refill: Capillary refill takes less than 2 seconds.      Coloration: Skin is not jaundiced.      Findings: No rash.      Comments: LIJ trialysis in place, clean dry dressing noted with Biopatch present  L midline in place, clean dry dressing with Biopatch in place   Neurological:      Mental Status: She is alert. Mental status is at baseline.   Psychiatric:         Mood and Affect: Mood normal.         Thought Content: Thought content normal.       Significant Labs: All pertinent labs within the past 24 hours have been reviewed.    Significant Imaging: I have reviewed all pertinent imaging results/findings within the past 24 hours.      Assessment/Plan:      * Microscopic polyangiitis  As of 10/26/22 strongly positive MPO Ab (in contrast to borderline positive PR3), consistent with clinical picture of  microscopic polyangiitis with pulmonary, and renal involvement after presenting with acute hypoxemic respiratory failure, hemoptysis, and acute renal failure.  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   3)  CHANGES SUGGESTIVE OF FOCAL ACUTE PYELONEPHRITIS.   4)  MILD-TO-MODERATE ARTERIONEPHROSCLEROSIS   - Rheumatology consulted, appreciate evaluation and recommendations     - MITUL + 1:2560 homogenous, +dsDNA, normal complements     - PR3 1.2 (slight +),  (+)     - cANCA + 1:80, pANCA neg     - GBMAb neg, trace cryos  - Transfusion Medicine consulted for apheresis  - Nephrology consulted, see separate documentation for [facility]      Plan  - Steroids:    - completed IV methylprednisolone 1000mg x 3 days   - now on steroid taper as guided by Rheumatology   - currently methylprednisolone 24mg IV daily   - plan for 20mg IV daily on 11/6 for 14 days (last dose of 20mg equivalent is 11/20/2022).   - apheresis: underwent PLEX 10/26, 10/27, 10/30, 11/1, 11/2, 11/3  - rituximab every 7 days for 4 doses: Dose #1: 10/27, #2 11/3, next dose 11/10  - cyclophosphamide every 14 days for 2 doses: Dose #1 10/27, next dose 11/10  - Opportunistic Infection ppx: atovaquone 1500mg PO daily  - GI bleed prophylaxis: pantoprazole 40mg daily     Acute hypoxemic respiratory failure  Multifocal pneumonia  Hemoptysis  Dyspnea  Diffuse Alveolar Hemorrahge  In the setting of a new diagnosis of microscopic polyangiitis, presented with fevers, dyspnea,.  - Chest imaging was consistent with diffuse alveolar hemorrhage.  CT  chest wc 10/17 with bl perihilar dense consolidations w/ peripheral patcy areas of GGO, BL PNA, less c/f cardiogenic pulmonary edema.  - Patient upgraded to Medical ICU 10/23/22 with abrupt respiratory decline requiring NIPPV, improved with high dose IV steroids, plasmapheresis, emergent hemodialysis.   - Unable to perform bronchoscopy in the Medical ICU due to tenuous respiratory status  - As of 11/6, hypoxemia has significantly improved    Plan:  - continue management of hypoxemia with supplemental oxygen  - continue management of underlying triggers with steroid and immunosuppressants  - Wean supplemental O2 as tolerated  - incentive spirometry and respiratory hygiene    Gastrointestinal hemorrhage with melena  Starting having hemoptysis and melena with associated acute blood loss anemia earlier in hospital stay. Patient with known internal hemorrhoids, mild sigmoid diverticulosis, history of gastritis and + H pylori (2012 EGD).   - GI consulted 10/19, unable to perform EGD due to instability and respiratory failure at the time    Plan  - patient unable to take PO PPI due to NGT removal on 11/5, transition to IV PPI 40mg pantoprazole daily, return to per NG tube once replaced  - Monitor CBC closely for signs of recurrent bleed          Dysphagia  SLP following  MBSS showing global weakness in swallowing as well as aspiration with thin liquids.   ENT consulted. Followup recs  NG tube clogged and removed 11/4, unable to be reinserted due to pain    Plan  - Essential medications transitioned temporarily to IV formulation on 11/5  - Appreciate RN re-attempt when patient able to tolerate NG tube placement  - If unable to tolerate NG tube re-attempt, will need to consult Gastroenterology for consideration of endoscopic placement.    - Discussed case with Rheumatology team, they are concerned that this dysphagia may have been from prior stroke, requesting imaging for evaluation - have graciously ordered head imaging on  behalf of this patient - will review results with consultants and patient/family once completed.       Moderate malnutrition  Nutrition consulted. Most recent weight and BMI monitored-          Malnutrition (Moderate to Severe)  Weight Loss (Malnutrition): 7.5% in 3 months              Measurements:  Wt Readings from Last 1 Encounters:   11/03/22 63 kg (138 lb 14.2 oz)   Body mass index is 26.24 kg/m².    Recommendations: Recommendation/Intervention: 1. Continue current TF regimen of Novasource @ 35 mL/hr - meeting needs. 2. RD to monitor & follow-up.  Goals: Meet % EEN, EPN by RD f/u date    Patient has been screened and assessed by RD. RD will follow patient.      Acute renal failure superimposed on stage 3 chronic kidney disease  Due to microscopic polyangiitis. Remains on hemodialysis intermittently.   - Baseline creatinine 1.0-1.2.   - New onset proteinuria/hematuria.   - Renal biopsy completed and   - Trialysis in place for intermittent Hemodialysis    Plan  - Nephrology consulted, appreciate management  - management of MPA as noted separately  - Renal function panel daily  - renally dose all medications  - intermittent Hemodialysis as indicated, per Nephrology     Lymphadenopathy of head and neck  - Has bilateral neck gland swelling with tenderness,consulted ENT  - No surgical intervention indicated   - Adenopathy likely reactive in nature   - Recommend further workup if it does not resolve within next 2 weeks     Acute blood loss anemia  In the setting of GI bleed with melena  - Evaluated by GI, see GI bleed documentation  - Last transfusion 10/28, Hgb slowly trending down since then  - Hgb 6.9 on 11/5      Plan  - Monitor CBC Daily   - Treat with pRBC transfusion PRN Hgb <7  - qualifies for transfusion on 11/5 with Hgb 6.9, 1u pRBC ordered      Chronic diastolic heart failure  Pulmonary Hypertension due to left heart disease  TTE (10/2022) EF 65%, G1DD  Home meds: Lisinopril, Toprol     Plan  - Active  volume control with intermittent Hemodialysis per Nephrology   - Daily weights (standing if tolerated)  - Strict I/Os  - Fluid restriction 1.5 day  - Cardiac diet w/ 2 g Na restriction      Primary hypertension  BP Readings from Last 1 Encounters:   11/05/22 (!) 159/69     - Patient is unable to tolerate PO mediations, all meds to be administered via NG tube    amLODIPine tablet 10 mg, 10 mg, Per NG tube, Daily    carvediloL tablet 12.5 mg, 12.5 mg, Per NG tube, BID    hydrALAZINE tablet 25 mg, 25 mg, Per NG tube, Q8H    lisinopriL tablet 40 mg, 40 mg, Per NG tube, Daily    Plan update:  - NG unable to be replaced temporarily on 11/5, transition to IV formulation until replaced    hydrALAZINE injection 10 mg, 10 mg, Intravenous, Q6H PRN SBP >160  - Vitals Q4hr    Hypothyroid  - Continue synthroid 25 mcg  - TSH 0.585 10/27/22      VTE Risk Mitigation (From admission, onward)         Ordered     Place sequential compression device  Until discontinued         10/25/22 1731     IP VTE HIGH RISK PATIENT  Once         10/17/22 1354     Reason for No Pharmacological VTE Prophylaxis  Once        Question:  Reasons:  Answer:  Risk of Bleeding    10/17/22 1354                Discharge Planning   ANTHONY: 11/7/2022     Code Status: Full Code   Is the patient medically ready for discharge?: No    Reason for patient still in hospital (select all that apply): Patient unstable and Treatment  Discharge Plan A: Home with family          Total visit time was 35 minutes with more than 50% personally spent face-to-face with the patient in on counseling and coordinating care. We discussed in detail the plan of care, the patients disease process, their response to treatment, and the discharge plan.Total time includes time spent reviewing the medical record, examining the patient, writing notes, and communicating with other professionals and the patients family.      Bev Daly MD, MPH, FACP  Senior Physician, Department of  Hospital Medicine [facility]  [address], [place], [place]

## 2022-11-05 NOTE — PROGRESS NOTES
"[facility] ([place] [address])  Rheumatology  Progress Note    Patient Name: Kristin Goodman  MRN: [id number]  Admission Date: 10/17/2022  Hospital Length of Stay: 19 days  Code Status: Full Code   Attending Provider: Immanuel Peres MD  Primary Care Physician: Lori Hernandez MD  Principal Problem: Microscopic polyangiitis    Subjective:     HPI: Patient is a 76 yo F with chronic diastolic heart failure, HTN, OA, who is admitted for respiratory failure. Rheumatology is consulted due to concern for vasculitis. Patient initially presented to the ED on 9/24/22 complaining of a week of cough followed by an episode of hemoptysis on 9/24 prompting the ED visit. They did a CTA which showed multifocal PNA. She was sent home with Levaquin. She followed up with PCP on 10/4/22 and was feeling better. Then she presented to the ED again on 10/14 and on 10/17 complaining of dyspnea. She had fevers in the few days leading up to admission of 102. She endorsed weakness, productive cough, dyspnea, and loss of appetite. Pt initally at 88% O2 saturation and improved to 98% on 2L NC. She was admitted for IV antibiotics. CT chest with contrast on 10/17 showed evidence of interval progression of bilateral perihilar dense consolidation with peripheral patchy areas of ground-glass opacification nonspecific as to the etiology.  Bilateral pneumonia as well as inflammatory etiologies considered.  Cardiogenic pulmonary edema considered less likely given the pattern.    On 10/19/22 she started having melena. Hb dropped to 6. She got 2 units pRBCs. GI was consulted. They noted "Colonoscopy in 2020 showed internal hemorrhoids and mild sigmoid diverticulosis. EGD in 2012 showed gastritis, biopsies positive for H. Pylori." "We considered doing EGD now, but from a pulmonary standpoint I do not think she is stable enough with the current pneumonia, therefore would recommend that we just follow the patient, treat with a PPI in case there " "is any peptic ulcer disease."    On 10/21/22 ENT was consulted due to left sided otalgia the day prior which resolved. She also was complaining of cervical adenopathy and sore throat. They did not recommend surgical intervention and felt that adenopathy was likely reactive.     On 10/23/22 ID was consulted. They recommended checking respiratory infection panel and fungal markers.    On 10/23/22 Nephrology was consulted due to worsening creatinine. They noted, "Patient is a noted to have baseline creatinine of 1 as recent as 8/2022 but progressive worsening of creatinine in early October.  On admission patient with creatinine of 1.6 (10/17) with subsequent progression and creatinine of 3.0 at time of consultation (10/23).  Nephrology consulted for acute kidney injury." "Patient with ATN nephritic picture in urine sediment, prot/crea ratio pending. Had hematuria in recent past but in context with positive urine cultures. Now definitely more dysmorphic red cells that wbcs in the urine. 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    On 10/23/22, she was transferred to ICU due to desaturations and respiratory distress. Pulmonologist noted that her respiratory status is too tenuous for bronchoscopy. She was stabilized with non-invasive ventilation and nasal cannula oxygen.      Interval History: NGT clogged yesterday and was removed. Unable to be reinserted due to patient intolerance of procedure.     Current Facility-Administered Medications   Medication Frequency    0.9%  NaCl infusion (for blood administration) Q24H PRN    0.9%  NaCl infusion (for blood administration) Q24H PRN    acetaminophen tablet 650 mg Q4H PRN    albuterol-ipratropium 2.5 mg-0.5 mg/3 mL nebulizer solution 3 mL Q4H PRN    aluminum-magnesium hydroxide-simethicone 200-200-20 mg/5 mL suspension 30 mL QID PRN    amLODIPine tablet " 10 mg Daily    atovaquone 750 mg/5 mL oral liquid 1,500 mg Daily    bisacodyL suppository 10 mg Daily PRN    carvediloL tablet 12.5 mg BID    dextrose 10% bolus 125 mL PRN    dextrose 10% bolus 250 mL PRN    glucagon (human recombinant) injection 1 mg PRN    glucose chewable tablet 16 g PRN    glucose chewable tablet 24 g PRN    hydrALAZINE tablet 25 mg Q8H    hydrALAZINE tablet 50 mg Q8H PRN    insulin aspart U-100 pen 1-10 Units Q6H PRN    levothyroxine tablet 25 mcg Before breakfast    lisinopriL tablet 40 mg Daily    melatonin tablet 6 mg Nightly PRN    methocarbamoL tablet 500 mg TID PRN    methylPREDNISolone sodium succinate injection 100 mg Daily    methylPREDNISolone sodium succinate injection 24 mg Daily    naloxone 0.4 mg/mL injection 0.02 mg PRN    ondansetron disintegrating tablet 8 mg Q8H PRN    pantoprazole suspension 40 mg Daily    prochlorperazine injection Soln 5 mg Q6H PRN    QUEtiapine tablet 25 mg QHS    simethicone chewable tablet 80 mg QID PRN    sodium chloride 0.9% 250 mL flush bag 1 time in Clinic/HOD    sodium chloride 0.9% flush 10 mL PRN    sodium chloride 0.9% flush 10 mL PRN    sodium chloride 0.9% flush 5 mL PRN     Facility-Administered Medications Ordered in Other Encounters   Medication Frequency    celecoxib capsule 400 mg Once    fentaNYL 50 mcg/mL injection  mcg PRN    LIDOcaine (PF) 10 mg/ml (1%) injection 10 mg Once PRN    LIDOcaine (PF) 10 mg/ml (1%) injection 10 mg Once    midazolam (VERSED) 1 mg/mL injection 0.5-4 mg PRN    ropivacaine 0.2% [facility] PainPRO Pump infusion 500 ML Continuous     Objective:     Vital Signs (Most Recent):  Temp: 98.6 °F (37 °C) (11/05/22 0820)  Pulse: 78 (11/05/22 0820)  Resp: 18 (11/05/22 0405)  BP: (!) 162/71 (11/05/22 0820)  SpO2: 97 % (11/05/22 0820)  O2 Device (Oxygen Therapy): room air (11/04/22 2112)   Vital Signs (24h Range):  Temp:  [98.1 °F (36.7 °C)-98.8 °F (37.1 °C)] 98.6 °F (37 °C)  Pulse:  [66-84] 78  Resp:  [15-19] 18  SpO2:   [92 %-98 %] 97 %  BP: ()/(50-83) 162/71     Weight: 63 kg (138 lb 14.2 oz) (11/03/22 1000)  Body mass index is 26.24 kg/m².  Body surface area is 1.65 meters squared.      Intake/Output Summary (Last 24 hours) at 11/5/2022 0911  Last data filed at 11/4/2022 1409  Gross per 24 hour   Intake 320 ml   Output 620 ml   Net -300 ml       Physical Exam   Constitutional: No distress.   HENT:   Head: Normocephalic and atraumatic.   Nose: No rhinorrhea or nasal congestion.   Mouth/Throat: Oropharynx is clear.   No tenderness to palpation of sinuses, nasal cartilage   Ears: No tenderness to palpation     Eyes: Pupils are equal, round, and reactive to light.   Cardiovascular: Normal rate and regular rhythm.   No murmur heard.  Pulmonary/Chest: Effort normal and breath sounds normal. No respiratory distress. She has no wheezes.   Abdominal: Soft. Bowel sounds are normal. There is no abdominal tenderness.   Musculoskeletal:         General: No deformity. Normal range of motion.      Cervical back: Normal range of motion.   Neurological: She is alert.   Skin: Skin is warm and dry.   Psychiatric: Affect and judgment normal.     Significant Labs:  All pertinent lab results from the last 24 hours have been reviewed.    Significant Imaging:  Imaging results within the past 24 hours have been reviewed.    Assessment/Plan:     Acute hypoxemic respiratory failure  Patient is a 78 yo F with chronic diastolic heart failure, HTN, OA, who is admitted for respiratory failure. Rheumatology is consulted due to concern for vasculitis. Patient presented with cough and hemoptysis since 9/24/22. She was admitted due to dyspnea and hypoxia on 10/17. She has had Hb drop to 6 this admission requiring blood transfusions. Reviewed her chest imaging which shows progressive disease from 10/14 until now which can be concerning for DAH. This might also explain the Hb drop. No bronch planned for now due to her tenuous respiratory status. GI defers  invasive workup for now because she is not stable enough. She has had increasing creatinine from baseline of 1 to 3.0 on 10/23/22. 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   3)  CHANGES SUGGESTIVE OF FOCAL ACUTE PYELONEPHRITIS.   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     Treatment to date  - s/p IV Solumedrol 1000 mg x3 doses. Now following PEXIVAS steroid taper. Currently on IV Solumedrol 24 mg daily.  - PLEX 10/26, 10/27, 10/29, 10/30, 11/1, 11/2, 11/3 (prior to Rituxan)  - Got SLED 10/27. Bedside HD 10/30  - Rituximab 375 mg/m^2 (600 mg) on 10/27 (every 7 day dose 1 of 4), 11/3 (dose 2 of 4)  - Cyclophosphamide 7.5 mg/kg on 10/27 (every 14 day dose 1 of 2)    Recommendations:  Continue steroid taper per PEXIVAS trial as in the chart below. Currently IV Solu-Medrol 24 mg daily (prednisone 30 mg daily equivalent). Started on 10/30. Will start Solu-Medrol 20 mg IV on 11/6 for total of 14 days  Atovaquone 1500 mg and Protonix daily while on high dose steroids.  Next Rituximab 275 mg/m^2 due 11/10  Next cyclophosphamide 7.5 mg/kg due on November 10  Monitor CBC and CMP in 10-14 days after giving chemotherapy              Thank you for this consult. These are preliminary recommendations. Please follow attending recommendations. Please message with any questions or concerns.         Danuta Neumann,   Rheumatology  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 cause       CK as history of previous elevations given pharyngeal weakness/dysphagia  CT head given h/o lacunar infarcts on CT head 2019 and acute onset dysphagia.  Solu-Medrol 24mg IV daily until 11/6/22 decrease to 20mg IV daily for 2 wks using   Pexivas tapering schedule  Rituximab 375mg IV next dose(#3)11/10/22  Cyclophosphamide 750mg/kg IV next dose(#2) 11/10/22  PLEX   # 7  completed 11/3/22  ? avacopan  Atovaquone 1500 mg daily  Pantoprazole  *Will need Evusheld given rituximab  F/u Speech Therapy for dysphagia: NPO, oral care strict aspiration precautions,  Appreciate ENT evaluation of dysphagia     Jason Woods MD  Rheumatology  [phone]

## 2022-11-05 NOTE — PROGRESS NOTES
[facility] ([address] Medicine  Progress Note    Patient Name: Kristin Goodman  MRN: [id number]  Patient Class: IP- Inpatient   Admission Date: 10/17/2022  Length of Stay: 19 days  Attending Physician: Immanuel Peres MD  Primary Care Provider: Lori Hernandez MD        Subjective:     Principal Problem:Microscopic polyangiitis        HPI:  Kristin Goodman is a 77 y.o. female with a past medical history of HTN, hypothyroidism, HFpEF, and osteoarthritis of the arm who has presented to the ED for cough, SOB, and weakness. Daughter is present at the bedside. Patient presented to the ED on 9/24 with hemoptysis, CT and CXR showed high suspicion for multilobar pneumonia. Patient was discharged with a 10-day course of levofloxacin and an albuterol inhaler. Patient followed up with her PCP on 10/4 with slight improvement in symptoms and went back to work. Patient continued to have worsening symptoms and presented to the ED again on 10/14 for evaluation and no interventions were done. Patient endorses fevers over the last few days up to 102.4 F with progressive SOB. She endorses hemoptysis with moderate amount of blood, generalized weakness, productive cough, SOB, and loss of appetite. Denies chest pain, nausea, vomiting, abdominal pain, or urinary changes.    ED: hypertensive up to 219/93 and tachycardic up to 117. Oxygen saturation on 92% on RA, placed on 5L NC with sats >97%. CBC remarkable for leukocytosis of 16.03 and Hb 7.7. K 3.3. Cr 1.6, baseline ~1.0. . Troponin 0.061. COVID and flu negative. EKG NSR. CT chest with contrast pending at time of admission. Given home amlodipine and lisinopril. Given 500mL NS, IV azithromycin, cefepime and vanc.       Overview/Hospital Course:  Initially admitted to hospital medicine, but stepped up to ICU due to hemoptysis. She has completed a course of Abx for PNA. Her presentation was concerning for a vasculitis and Rheumatology and nephrology  consulted. Serologic testing revealed microscopic polyangitis. Renal Biopsy done, pending. She has had plex, steroids, rituximab, cyclophosphamide, and CRRT. Nephro plans to continue HD. Tapering her steroids per rheum recs. Doing well on 2L NC. Stable for step down.       Interval History:   Patient lying in bed, no acute distress. Family at bedside. No acute events overnight. Patient reports mental status is close to baseline and family notes that patient's speech is more clear. NG tube and rectal tube intact. Patient reports decreased frequency of bowel movements. Rectal tube removed. Last session of PLEX today and will initiate Rituximab.        Review of Systems   Constitutional:  Positive for activity change and fatigue. Negative for chills and fever.   HENT:  Positive for trouble swallowing. Negative for congestion.    Eyes:  Negative for visual disturbance.   Respiratory:  Negative for cough and shortness of breath.    Cardiovascular:  Negative for chest pain and leg swelling.   Gastrointestinal:  Negative for abdominal distention, abdominal pain, nausea and vomiting.   Genitourinary:  Negative for difficulty urinating and dysuria.   Musculoskeletal:  Negative for back pain and myalgias.   Skin:  Negative for rash and wound.   Neurological:  Positive for weakness. Negative for dizziness and light-headedness.   Hematological:  Negative for adenopathy. Does not bruise/bleed easily.   Psychiatric/Behavioral:  Negative for confusion and sleep disturbance.    Objective:     Vital Signs (Most Recent):  Temp: 98.6 °F (37 °C) (11/05/22 0820)  Pulse: 78 (11/05/22 0820)  Resp: 18 (11/05/22 0405)  BP: (!) 162/71 (11/05/22 0820)  SpO2: 97 % (11/05/22 0820) Vital Signs (24h Range):  Temp:  [98.1 °F (36.7 °C)-98.8 °F (37.1 °C)] 98.6 °F (37 °C)  Pulse:  [66-83] 78  Resp:  [15-19] 18  SpO2:  [92 %-98 %] 97 %  BP: ()/(50-83) 162/71     Weight: 63 kg (138 lb 14.2 oz)  Body mass index is 26.24 kg/m².    Intake/Output  Summary (Last 24 hours) at 11/5/2022 1005  Last data filed at 11/4/2022 1409  Gross per 24 hour   Intake 320 ml   Output 620 ml   Net -300 ml        Physical Exam  Vitals and nursing note reviewed.   Constitutional:       General: She is not in acute distress.     Appearance: She is well-developed. She is ill-appearing. She is not toxic-appearing.      Comments: Soft-spoken    HENT:      Head: Normocephalic and atraumatic.      Nose:      Comments: NG tube     Mouth/Throat:      Mouth: Mucous membranes are dry.   Eyes:      Conjunctiva/sclera: Conjunctivae normal.   Cardiovascular:      Rate and Rhythm: Normal rate and regular rhythm.      Heart sounds: Normal heart sounds.   Pulmonary:      Effort: Pulmonary effort is normal. No respiratory distress.      Breath sounds: No wheezing or rales.   Abdominal:      General: Bowel sounds are normal. There is no distension.      Palpations: Abdomen is soft.      Tenderness: There is no abdominal tenderness.   Musculoskeletal:         General: No tenderness. Normal range of motion.      Cervical back: Neck supple.      Right lower leg: No edema.      Left lower leg: No edema.   Lymphadenopathy:      Cervical: No cervical adenopathy.   Skin:     General: Skin is warm and dry.      Findings: No rash.   Neurological:      General: No focal deficit present.      Mental Status: She is alert and oriented to person, place, and time.   Psychiatric:         Mood and Affect: Mood normal.       Significant Labs: CBC:   Recent Labs   Lab 11/04/22 0445 11/05/22  0359   WBC 15.67* 19.07*   HGB 7.9* 6.9*   HCT 24.4* 21.3*    158       CMP:   Recent Labs   Lab 11/04/22 0445 11/05/22  0359   * 142   K 4.0 4.2    107   CO2 26 24   * 102   * 74*   CREATININE 4.5* 3.0*   CALCIUM 8.9 8.1*   PROT 5.5* 5.1*   ALBUMIN 3.4* 3.0*   BILITOT 0.6 0.6   ALKPHOS 72 59   AST 31 53*   ALT 36 49*   ANIONGAP 16 11         Significant Imaging: I have reviewed all pertinent  imaging results/findings within the past 24 hours.      Assessment/Plan:      * Microscopic polyangiitis  As of 10/26/22 strongly positive MPO Ab (in contrast to borderline positive PR3), consistent with clinical picture of microscopic polyangiitis with pulmonary, and renal involvement. Trialysis catheter placed overnight with plans for plasmapheresis early this AM. Pt tolerated procedure well despite multiple attempts to place line. Went for IR Renal biopsy this AM. Tolerated procedure well. Endorsing burning at szymanski insertion site. Continuing Azithromycin and Cefepime. Rheumatology planning to start rituximab and cyclophosphamide.      - Nephrology consulted  - Transfusion medicine consulted  - Rheumatology consulted  - Continue Plasmapheresis w/ plans for 7 treatments over 14-day period  - Rituximab and cyclophosphamide initiated 10/28.   -- Next Rituximab 275 mg/m^2 due on November 3  -- Next cyclophosphamide 7.5 mg/kg due on November 10          - s/p 7 days of high dose steroids, tapering per rheumatology's recs  -- IV solumedrol 24mg daily x7 days  - Protonix daily  - PJP prophylaxis with atovaquone via NG    Dysphagia  SLP following  ENT consulted. Followup recs      Hypernatremia  Adjust free water flushes as indicated well patient has NGT for tube feeds      Dysuria  - UA 10/30 not consistent with urinary tract infection  - Unable to take Pyridium due to renal function      Moderate malnutrition  Nutrition consulted. Most recent weight and BMI monitored-          Malnutrition (Moderate to Severe)  Weight Loss (Malnutrition): 7.5% in 3 months              Measurements:  Wt Readings from Last 1 Encounters:   10/27/22 63.5 kg (139 lb 15.9 oz)   Body mass index is 26.45 kg/m².    Recommendations: Recommendation/Intervention: 1. Pending NGT placement. When able, initiate TFs. Rec'd Novasource @ 35 mL/hr to provide 1680 kcals, 76 g of protein, 602 mL fluid. 2. RD to monitor & follow-up.  Goals: Meet %  EEN, EPN by RD f/u date    Patient has been screened and assessed by RD. RD will follow patient.      Septic shock  See above    Acute renal failure  See microscopic polyangitis    Acute hypoxemic respiratory failure  Microscopic polyangitis  Multifocal pneumonia  Hemoptysis  76 yo PMHx HTN, hypothyroid, HFpEF, OA admitted for fevers and respiratory symptoms, treated while on Hospital Medicine for multifocal PNA. Course complicated by GIB bleed (no EGD yet d/t PNA), hyponatremia, ATN. MICU consulted for rapidly increasing oxygen requirement (4L NC to BiPAP 15/10 50%), respiratory distress/work of breathing, somnolence. Nephrology consulted for ATN, concerns for possible nephritic disease as  behind ATN d/t acanthrocytes. Reports of scant hemoptysis by nursing staff 10/21, 10/22.     Imaging: CXR: Diffuse bilateral airspace infiltrates w/ c/f alveolar fillings; CT chest wc 10/17 with bl perihilar dense consolidations w/ peripheral patcy areas of GGO, BL PNA, less c/f cardiogenic pulmonary edema.  Labs: WBC uptrending 28.97, Hgb lowest 6.0 (s/p 2 u PRBC), continues to downtrend approx 1 point / day. sCr uptrending, (baseline 1.0-1.2). .        No results for input(s): PH, PCO2, PO2, HCO3, POCSATURATED, BE in the last 72 hours.  Etiology: Microscopic polyangitis likely. Concerns for possible PE as patient was not on thrombotic ppx s/o GIB. Incomplete I/Os charted, though reports of low UOP also renders pulmonary congestion edema as a possibility. Less c/f acute progression of multifocal PNA as adequate ABx coverage and no new fevers recently.     - US DVT BLE unremarkable  - Consideration for urgent BAL +/- repeat chest imaging (last done 10/17)  - Rheumatology consulted  -- Continue Rituximab, cyclophosphamide  -- IV solumedrol per Rheumatology  -- PJP prophylaxis  - Nephrology consulted  -- iHD nephrology  - Transfusion medicine  -- PLEX started 10/26, plans for 7 cycles  - Neuro checks  - Wean  supplemental O2 as tolerated    Cervical adenopathy  See above      Lymphadenopathy of head and neck  - Has bilateral neck gland swelling with tenderness,consulted ENT  - No surgical intervention indicated   - Adenopathy likely reactive in nature   - Recommend further workup if it does not resolve within next 2 weeks     Acute blood loss anemia  Starting having melena,HH is dropped,transfused 2 PRBC,HH is improved,started on Protonix gtt and consulted GI.per GI not candidate for EGD at this time,duo to pneumonia and hypoxia,will be monitored.        Acute renal failure superimposed on stage 3 chronic kidney disease  Patient w/o CKD presenting with WILFREDO with rapidly increasing sCr (baseline sCr 1.0-1.2). New onset proteinuria/hematuria in last 30 days. .  DDx: ATN vs GN.       - Renal Bx done, f/u pathology  - High-dose steroids completed, tapering per rheumatology recs.  - Rituximab and cytoxan per rheum recs.  - Undergoing PLEX, see transfusion medicine recs  - iHD per nephrology  - Trend sCr  - Strict I&Os  - Avoid nephrotoxic agents  - Renally adjust medications  - Monitor for renal recovery    Multifocal pneumonia  See above      Chronic diastolic heart failure  Pulmonary Hypertension   TTE (10/2022) EF 65%, G1DD  Home meds: Lisinopril, Toprol     - Volume control plan per Acute Hypoxemic Respiratory Failure A/P  - Daily weights (standing if tolerated)  - Strict I/Os  - Fluid restriction 1.5 day  - Cardiac diet w/ 2 g Na restriction      Pulmonary HTN  See above      CKD (chronic kidney disease), stage III  See microscopic polyangitis    HTN (hypertension)  - Continue to titrate antihypertensives  - Continue Amlodipine 10mg and Lisinopril 40mg  - Coreg 12.5mg, titrate as HR tolerates  - Hydralazine 25mg every 8 hours  - PRN hydralazine      Hypothyroid  - Continue synthroid 25 mcg  - TSH 0.585 10/27/22      VTE Risk Mitigation (From admission, onward)         Ordered     Place sequential compression device   Until discontinued         10/25/22 1731     IP VTE HIGH RISK PATIENT  Once         10/17/22 1354     Reason for No Pharmacological VTE Prophylaxis  Once        Question:  Reasons:  Answer:  Risk of Bleeding    10/17/22 1354                Discharge Planning   ANTHONY: 11/7/2022     Code Status: Full Code   Is the patient medically ready for discharge?: No    Reason for patient still in hospital (select all that apply): Patient unstable, Patient trending condition, Laboratory test, Treatment and Consult recommendations  Discharge Plan A: Home with family          Time spent in care of patient: > 35 minutes           Immanuel Peres MD  Department of Hospital Medicine   [facility] - Intensive Care ([place])

## 2022-11-05 NOTE — ASSESSMENT & PLAN NOTE
Starting having hemoptysis and melena with associated acute blood loss anemia earlier in hospital stay. Patient with known internal hemorrhoids, mild sigmoid diverticulosis, history of gastritis and + H pylori (2012 EGD).   - GI consulted 10/19, unable to perform EGD due to instability and respiratory failure at the time    Plan  - patient unable to take PO PPI due to NGT removal on 11/5, transition to IV PPI 40mg pantoprazole daily, return to per NG tube once replaced  - Monitor CBC closely for signs of recurrent bleed

## 2022-11-05 NOTE — ASSESSMENT & PLAN NOTE
Due to microscopic polyangiitis. Remains on hemodialysis intermittently.   - Baseline creatinine 1.0-1.2.   - New onset proteinuria/hematuria.   - Renal biopsy completed and   - Trialysis in place for intermittent Hemodialysis    Plan  - Nephrology consulted, appreciate management  - management of MPA as noted separately  - Renal function panel daily  - renally dose all medications  - intermittent Hemodialysis as indicated, per Nephrology

## 2022-11-05 NOTE — CARE UPDATE
RAPID RESPONSE NURSE ROUND       Rounding completed with charge RN, [place] for [facility] 5.9 reports 1u RBC ordered. No additional concerns verbalized at this time. Instructed to call [phone] for further concerns or assistance.

## 2022-11-05 NOTE — SUBJECTIVE & OBJECTIVE
Interval History: NGT clogged yesterday and was removed. Unable to be reinserted due to patient intolerance of procedure.     Current Facility-Administered Medications   Medication Frequency    0.9%  NaCl infusion (for blood administration) Q24H PRN    0.9%  NaCl infusion (for blood administration) Q24H PRN    acetaminophen tablet 650 mg Q4H PRN    albuterol-ipratropium 2.5 mg-0.5 mg/3 mL nebulizer solution 3 mL Q4H PRN    aluminum-magnesium hydroxide-simethicone 200-200-20 mg/5 mL suspension 30 mL QID PRN    amLODIPine tablet 10 mg Daily    atovaquone 750 mg/5 mL oral liquid 1,500 mg Daily    bisacodyL suppository 10 mg Daily PRN    carvediloL tablet 12.5 mg BID    dextrose 10% bolus 125 mL PRN    dextrose 10% bolus 250 mL PRN    glucagon (human recombinant) injection 1 mg PRN    glucose chewable tablet 16 g PRN    glucose chewable tablet 24 g PRN    hydrALAZINE tablet 25 mg Q8H    hydrALAZINE tablet 50 mg Q8H PRN    insulin aspart U-100 pen 1-10 Units Q6H PRN    levothyroxine tablet 25 mcg Before breakfast    lisinopriL tablet 40 mg Daily    melatonin tablet 6 mg Nightly PRN    methocarbamoL tablet 500 mg TID PRN    methylPREDNISolone sodium succinate injection 100 mg Daily    methylPREDNISolone sodium succinate injection 24 mg Daily    naloxone 0.4 mg/mL injection 0.02 mg PRN    ondansetron disintegrating tablet 8 mg Q8H PRN    pantoprazole suspension 40 mg Daily    prochlorperazine injection Soln 5 mg Q6H PRN    QUEtiapine tablet 25 mg QHS    simethicone chewable tablet 80 mg QID PRN    sodium chloride 0.9% 250 mL flush bag 1 time in Clinic/HOD    sodium chloride 0.9% flush 10 mL PRN    sodium chloride 0.9% flush 10 mL PRN    sodium chloride 0.9% flush 5 mL PRN     Facility-Administered Medications Ordered in Other Encounters   Medication Frequency    celecoxib capsule 400 mg Once    fentaNYL 50 mcg/mL injection  mcg PRN    LIDOcaine (PF) 10 mg/ml (1%) injection 10 mg Once PRN    LIDOcaine (PF) 10 mg/ml  (1%) injection 10 mg Once    midazolam (VERSED) 1 mg/mL injection 0.5-4 mg PRN    ropivacaine 0.2% Nimbus PainPRO Pump infusion 500 ML Continuous     Objective:     Vital Signs (Most Recent):  Temp: 98.6 °F (37 °C) (11/05/22 0820)  Pulse: 78 (11/05/22 0820)  Resp: 18 (11/05/22 0405)  BP: (!) 162/71 (11/05/22 0820)  SpO2: 97 % (11/05/22 0820)  O2 Device (Oxygen Therapy): room air (11/04/22 2112)   Vital Signs (24h Range):  Temp:  [98.1 °F (36.7 °C)-98.8 °F (37.1 °C)] 98.6 °F (37 °C)  Pulse:  [66-84] 78  Resp:  [15-19] 18  SpO2:  [92 %-98 %] 97 %  BP: ()/(50-83) 162/71     Weight: 63 kg (138 lb 14.2 oz) (11/03/22 1000)  Body mass index is 26.24 kg/m².  Body surface area is 1.65 meters squared.      Intake/Output Summary (Last 24 hours) at 11/5/2022 0911  Last data filed at 11/4/2022 1409  Gross per 24 hour   Intake 320 ml   Output 620 ml   Net -300 ml       Physical Exam   Constitutional: No distress.   HENT:   Head: Normocephalic and atraumatic.   Nose: No rhinorrhea or nasal congestion.   Mouth/Throat: Oropharynx is clear.   No tenderness to palpation of sinuses, nasal cartilage   Ears: No tenderness to palpation     Eyes: Pupils are equal, round, and reactive to light.   Cardiovascular: Normal rate and regular rhythm.   No murmur heard.  Pulmonary/Chest: Effort normal and breath sounds normal. No respiratory distress. She has no wheezes.   Abdominal: Soft. Bowel sounds are normal. There is no abdominal tenderness.   Musculoskeletal:         General: No deformity. Normal range of motion.      Cervical back: Normal range of motion.   Neurological: She is alert.   Skin: Skin is warm and dry.   Psychiatric: Affect and judgment normal.     Significant Labs:  All pertinent lab results from the last 24 hours have been reviewed.    Significant Imaging:  Imaging results within the past 24 hours have been reviewed.

## 2022-11-05 NOTE — ASSESSMENT & PLAN NOTE
SLP following  MBSS showing global weakness in swallowing as well as aspiration with thin liquids.   ENT consulted. Followup recs

## 2022-11-05 NOTE — ASSESSMENT & PLAN NOTE
As of 10/26/22 strongly positive MPO Ab (in contrast to borderline positive PR3), consistent with clinical picture of microscopic polyangiitis with pulmonary, and renal involvement after presenting with acute hypoxemic respiratory failure, hemoptysis, and acute renal failure.  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   3)  CHANGES SUGGESTIVE OF FOCAL ACUTE PYELONEPHRITIS.   4)  MILD-TO-MODERATE ARTERIONEPHROSCLEROSIS   - Rheumatology consulted, appreciate evaluation and recommendations     - MITUL + 1:2560 homogenous, +dsDNA, normal complements     - PR3 1.2 (slight +),  (+)     - cANCA + 1:80, pANCA neg     - GBMAb neg, trace cryos  - Transfusion Medicine consulted for apheresis  - Nephrology consulted, see separate documentation for [facility]      Plan  - Steroids:    - completed IV methylprednisolone 1000mg x 3 days   - now on steroid taper as guided by Rheumatology   - currently methylprednisolone 24mg IV daily   - plan for 20mg IV daily on 11/6 for 14 days (last dose of 20mg equivalent is 11/20/2022).   - apheresis: underwent PLEX 10/26, 10/27, 10/30, 11/1, 11/2, 11/3  - rituximab every 7 days for 4 doses: Dose #1: 10/27, #2 11/3, next dose 11/10  - cyclophosphamide every 14 days for 2 doses: Dose #1 10/27, next dose 11/10  - Opportunistic Infection ppx: atovaquone 1500mg PO daily  - GI bleed prophylaxis: pantoprazole 40mg daily

## 2022-11-05 NOTE — ASSESSMENT & PLAN NOTE
Pulmonary Hypertension due to left heart disease  TTE (10/2022) EF 65%, G1DD  Home meds: Lisinopril, Toprol     Plan  - Active volume control with intermittent Hemodialysis per Nephrology   - Daily weights (standing if tolerated)  - Strict I/Os  - Fluid restriction 1.5 day  - Cardiac diet w/ 2 g Na restriction

## 2022-11-05 NOTE — CONSULTS
Thank you for your consult to [facility]. We have reviewed the patient chart. This patient does not meet criteria for [facility] service at this time due to Patient has a condition likely to benefit from in-depth physical exam, or palpation / auscultation, or serial abdominal exams, or need for NGT placement and pulmonary exam due to risk of aspiration . Will hand back to In-house service.    Haleigh Parks MD

## 2022-11-05 NOTE — ASSESSMENT & PLAN NOTE
Microscopic polyangitis  Multifocal pneumonia  Hemoptysis  78 yo PMHx HTN, hypothyroid, HFpEF, OA admitted for fevers and respiratory symptoms, treated while on Hospital Medicine for multifocal PNA. Course complicated by GIB bleed (no EGD yet d/t PNA), hyponatremia, ATN. MICU consulted for rapidly increasing oxygen requirement (4L NC to BiPAP 15/10 50%), respiratory distress/work of breathing, somnolence. Nephrology consulted for ATN, concerns for possible nephritic disease as  behind ATN d/t acanthrocytes. Reports of scant hemoptysis by nursing staff 10/21, 10/22.     Imaging: CXR: Diffuse bilateral airspace infiltrates w/ c/f alveolar fillings; CT chest wc 10/17 with bl perihilar dense consolidations w/ peripheral patcy areas of GGO, BL PNA, less c/f cardiogenic pulmonary edema.  Labs: WBC uptrending 28.97, Hgb lowest 6.0 (s/p 2 u PRBC), continues to downtrend approx 1 point / day. sCr uptrending, (baseline 1.0-1.2). .        No results for input(s): PH, PCO2, PO2, HCO3, POCSATURATED, BE in the last 72 hours.  Etiology: Microscopic polyangitis likely. Concerns for possible PE as patient was not on thrombotic ppx s/o GIB. Incomplete I/Os charted, though reports of low UOP also renders pulmonary congestion edema as a possibility. Less c/f acute progression of multifocal PNA as adequate ABx coverage and no new fevers recently.     - US DVT BLE unremarkable  - Consideration for urgent BAL +/- repeat chest imaging (last done 10/17)  - Rheumatology consulted  -- Continue Rituximab, cyclophosphamide  -- IV solumedrol per Rheumatology  -- PJP prophylaxis  - Nephrology consulted  -- iHD nephrology  - Transfusion medicine  -- PLEX started 10/26, plans for 7 cycles  - Neuro checks  - Wean supplemental O2 as tolerated  - RESOLVED

## 2022-11-05 NOTE — SUBJECTIVE & OBJECTIVE
Interval History:   Patient lying in bed, no acute distress. Family at bedside. No acute events overnight. Patient reports mental status is close to baseline and family notes that patient's speech is more clear. NG tube and rectal tube intact. Patient reports decreased frequency of bowel movements. Rectal tube removed. Last session of PLEX today and will initiate Rituximab.        Review of Systems   Constitutional:  Positive for activity change and fatigue. Negative for chills and fever.   HENT:  Positive for trouble swallowing. Negative for congestion.    Eyes:  Negative for visual disturbance.   Respiratory:  Negative for cough and shortness of breath.    Cardiovascular:  Negative for chest pain and leg swelling.   Gastrointestinal:  Negative for abdominal distention, abdominal pain, nausea and vomiting.   Genitourinary:  Negative for difficulty urinating and dysuria.   Musculoskeletal:  Negative for back pain and myalgias.   Skin:  Negative for rash and wound.   Neurological:  Positive for weakness. Negative for dizziness and light-headedness.   Hematological:  Negative for adenopathy. Does not bruise/bleed easily.   Psychiatric/Behavioral:  Negative for confusion and sleep disturbance.    Objective:     Vital Signs (Most Recent):  Temp: 98.6 °F (37 °C) (11/05/22 0820)  Pulse: 78 (11/05/22 0820)  Resp: 18 (11/05/22 0405)  BP: (!) 162/71 (11/05/22 0820)  SpO2: 97 % (11/05/22 0820) Vital Signs (24h Range):  Temp:  [98.1 °F (36.7 °C)-98.8 °F (37.1 °C)] 98.6 °F (37 °C)  Pulse:  [66-83] 78  Resp:  [15-19] 18  SpO2:  [92 %-98 %] 97 %  BP: ()/(50-83) 162/71     Weight: 63 kg (138 lb 14.2 oz)  Body mass index is 26.24 kg/m².    Intake/Output Summary (Last 24 hours) at 11/5/2022 1005  Last data filed at 11/4/2022 1409  Gross per 24 hour   Intake 320 ml   Output 620 ml   Net -300 ml        Physical Exam  Vitals and nursing note reviewed.   Constitutional:       General: She is not in acute distress.     Appearance:  She is well-developed. She is ill-appearing. She is not toxic-appearing.      Comments: Soft-spoken    HENT:      Head: Normocephalic and atraumatic.      Nose:      Comments: NG tube     Mouth/Throat:      Mouth: Mucous membranes are dry.   Eyes:      Conjunctiva/sclera: Conjunctivae normal.   Cardiovascular:      Rate and Rhythm: Normal rate and regular rhythm.      Heart sounds: Normal heart sounds.   Pulmonary:      Effort: Pulmonary effort is normal. No respiratory distress.      Breath sounds: No wheezing or rales.   Abdominal:      General: Bowel sounds are normal. There is no distension.      Palpations: Abdomen is soft.      Tenderness: There is no abdominal tenderness.   Musculoskeletal:         General: No tenderness. Normal range of motion.      Cervical back: Neck supple.      Right lower leg: No edema.      Left lower leg: No edema.   Lymphadenopathy:      Cervical: No cervical adenopathy.   Skin:     General: Skin is warm and dry.      Findings: No rash.   Neurological:      General: No focal deficit present.      Mental Status: She is alert and oriented to person, place, and time.   Psychiatric:         Mood and Affect: Mood normal.       Significant Labs: CBC:   Recent Labs   Lab 11/04/22 0445 11/05/22 0359   WBC 15.67* 19.07*   HGB 7.9* 6.9*   HCT 24.4* 21.3*    158       CMP:   Recent Labs   Lab 11/04/22 0445 11/05/22 0359   * 142   K 4.0 4.2    107   CO2 26 24   * 102   * 74*   CREATININE 4.5* 3.0*   CALCIUM 8.9 8.1*   PROT 5.5* 5.1*   ALBUMIN 3.4* 3.0*   BILITOT 0.6 0.6   ALKPHOS 72 59   AST 31 53*   ALT 36 49*   ANIONGAP 16 11         Significant Imaging: I have reviewed all pertinent imaging results/findings within the past 24 hours.

## 2022-11-05 NOTE — ASSESSMENT & PLAN NOTE
BP Readings from Last 1 Encounters:   11/05/22 (!) 159/69     - Patient is unable to tolerate PO mediations, all meds to be administered via NG tube    amLODIPine tablet 10 mg, 10 mg, Per NG tube, Daily    carvediloL tablet 12.5 mg, 12.5 mg, Per NG tube, BID    hydrALAZINE tablet 25 mg, 25 mg, Per NG tube, Q8H    lisinopriL tablet 40 mg, 40 mg, Per NG tube, Daily    Plan update:  - NG unable to be replaced temporarily on 11/5, transition to IV formulation until replaced    hydrALAZINE injection 10 mg, 10 mg, Intravenous, Q6H PRN SBP >160  - Vitals Q4hr

## 2022-11-06 LAB
ALBUMIN SERPL BCP-MCNC: 2.8 G/DL (ref 3.5–5.2)
ALP SERPL-CCNC: 63 U/L (ref 55–135)
ALT SERPL W/O P-5'-P-CCNC: 48 U/L (ref 10–44)
ANION GAP SERPL CALC-SCNC: 16 MMOL/L (ref 8–16)
AST SERPL-CCNC: 37 U/L (ref 10–40)
BASOPHILS # BLD AUTO: 0.02 K/UL (ref 0–0.2)
BASOPHILS NFR BLD: 0.1 % (ref 0–1.9)
BILIRUB SERPL-MCNC: 0.9 MG/DL (ref 0.1–1)
BUN SERPL-MCNC: 93 MG/DL (ref 8–23)
CALCIUM SERPL-MCNC: 7.8 MG/DL (ref 8.7–10.5)
CHLORIDE SERPL-SCNC: 108 MMOL/L (ref 95–110)
CK SERPL-CCNC: 92 U/L (ref 20–180)
CO2 SERPL-SCNC: 20 MMOL/L (ref 23–29)
CREAT SERPL-MCNC: 4.2 MG/DL (ref 0.5–1.4)
DIFFERENTIAL METHOD: ABNORMAL
EOSINOPHIL # BLD AUTO: 0 K/UL (ref 0–0.5)
EOSINOPHIL NFR BLD: 0.1 % (ref 0–8)
ERYTHROCYTE [DISTWIDTH] IN BLOOD BY AUTOMATED COUNT: 16.5 % (ref 11.5–14.5)
EST. GFR  (NO RACE VARIABLE): 10.5 ML/MIN/1.73 M^2
GLUCOSE SERPL-MCNC: 81 MG/DL (ref 70–110)
HCT VFR BLD AUTO: 26.5 % (ref 37–48.5)
HGB BLD-MCNC: 8.3 G/DL (ref 12–16)
IMM GRANULOCYTES # BLD AUTO: 0.18 K/UL (ref 0–0.04)
IMM GRANULOCYTES NFR BLD AUTO: 0.9 % (ref 0–0.5)
LYMPHOCYTES # BLD AUTO: 1.1 K/UL (ref 1–4.8)
LYMPHOCYTES NFR BLD: 5.7 % (ref 18–48)
MAGNESIUM SERPL-MCNC: 1.8 MG/DL (ref 1.6–2.6)
MCH RBC QN AUTO: 29 PG (ref 27–31)
MCHC RBC AUTO-ENTMCNC: 31.3 G/DL (ref 32–36)
MCV RBC AUTO: 93 FL (ref 82–98)
MONOCYTES # BLD AUTO: 1 K/UL (ref 0.3–1)
MONOCYTES NFR BLD: 5.2 % (ref 4–15)
NEUTROPHILS # BLD AUTO: 16.8 K/UL (ref 1.8–7.7)
NEUTROPHILS NFR BLD: 88 % (ref 38–73)
NRBC BLD-RTO: 0 /100 WBC
PHOSPHATE SERPL-MCNC: 7.2 MG/DL (ref 2.7–4.5)
PLATELET # BLD AUTO: 154 K/UL (ref 150–450)
PMV BLD AUTO: 13 FL (ref 9.2–12.9)
POCT GLUCOSE: 148 MG/DL (ref 70–110)
POCT GLUCOSE: 88 MG/DL (ref 70–110)
POCT GLUCOSE: 94 MG/DL (ref 70–110)
POTASSIUM SERPL-SCNC: 3.8 MMOL/L (ref 3.5–5.1)
PROT SERPL-MCNC: 4.9 G/DL (ref 6–8.4)
RBC # BLD AUTO: 2.86 M/UL (ref 4–5.4)
SODIUM SERPL-SCNC: 144 MMOL/L (ref 136–145)
WBC # BLD AUTO: 19.03 K/UL (ref 3.9–12.7)

## 2022-11-06 PROCEDURE — 99231 SBSQ HOSP IP/OBS SF/LOW 25: CPT | Mod: GC,,, | Performed by: INTERNAL MEDICINE

## 2022-11-06 PROCEDURE — C9113 INJ PANTOPRAZOLE SODIUM, VIA: HCPCS | Performed by: INTERNAL MEDICINE

## 2022-11-06 PROCEDURE — 99232 PR SUBSEQUENT HOSPITAL CARE,LEVL II: ICD-10-PCS | Mod: ,,, | Performed by: HOSPITALIST

## 2022-11-06 PROCEDURE — 82550 ASSAY OF CK (CPK): CPT | Performed by: INTERNAL MEDICINE

## 2022-11-06 PROCEDURE — 87040 BLOOD CULTURE FOR BACTERIA: CPT | Performed by: HOSPITALIST

## 2022-11-06 PROCEDURE — 80053 COMPREHEN METABOLIC PANEL: CPT

## 2022-11-06 PROCEDURE — 84100 ASSAY OF PHOSPHORUS: CPT

## 2022-11-06 PROCEDURE — 94660 CPAP INITIATION&MGMT: CPT

## 2022-11-06 PROCEDURE — 99900035 HC TECH TIME PER 15 MIN (STAT)

## 2022-11-06 PROCEDURE — 85025 COMPLETE CBC W/AUTO DIFF WBC: CPT

## 2022-11-06 PROCEDURE — 99231 PR SUBSEQUENT HOSPITAL CARE,LEVL I: ICD-10-PCS | Mod: GC,,, | Performed by: INTERNAL MEDICINE

## 2022-11-06 PROCEDURE — 20600001 HC STEP DOWN PRIVATE ROOM

## 2022-11-06 PROCEDURE — 25000003 PHARM REV CODE 250: Performed by: STUDENT IN AN ORGANIZED HEALTH CARE EDUCATION/TRAINING PROGRAM

## 2022-11-06 PROCEDURE — 83735 ASSAY OF MAGNESIUM: CPT

## 2022-11-06 PROCEDURE — 94799 UNLISTED PULMONARY SVC/PX: CPT

## 2022-11-06 PROCEDURE — 99232 SBSQ HOSP IP/OBS MODERATE 35: CPT | Mod: ,,, | Performed by: HOSPITALIST

## 2022-11-06 PROCEDURE — 25000003 PHARM REV CODE 250

## 2022-11-06 PROCEDURE — 63600175 PHARM REV CODE 636 W HCPCS: Performed by: INTERNAL MEDICINE

## 2022-11-06 PROCEDURE — 94761 N-INVAS EAR/PLS OXIMETRY MLT: CPT

## 2022-11-06 RX ADMIN — HYDRALAZINE HYDROCHLORIDE 10 MG: 20 INJECTION, SOLUTION INTRAMUSCULAR; INTRAVENOUS at 01:11

## 2022-11-06 RX ADMIN — HYDRALAZINE HYDROCHLORIDE 10 MG: 20 INJECTION, SOLUTION INTRAMUSCULAR; INTRAVENOUS at 08:11

## 2022-11-06 RX ADMIN — PANTOPRAZOLE SODIUM 40 MG: 40 INJECTION, POWDER, FOR SOLUTION INTRAVENOUS at 12:11

## 2022-11-06 RX ADMIN — QUETIAPINE FUMARATE 25 MG: 25 TABLET ORAL at 08:11

## 2022-11-06 RX ADMIN — CARVEDILOL 12.5 MG: 12.5 TABLET, FILM COATED ORAL at 08:11

## 2022-11-06 RX ADMIN — METHYLPREDNISOLONE SODIUM SUCCINATE 20 MG: 40 INJECTION, POWDER, FOR SOLUTION INTRAMUSCULAR; INTRAVENOUS at 12:11

## 2022-11-06 RX ADMIN — HYDRALAZINE HYDROCHLORIDE 25 MG: 25 TABLET, FILM COATED ORAL at 10:11

## 2022-11-06 NOTE — ASSESSMENT & PLAN NOTE
As of 10/26/22 strongly positive MPO Ab (in contrast to borderline positive PR3), consistent with clinical picture of microscopic polyangiitis with pulmonary, and renal involvement after presenting with acute hypoxemic respiratory failure, hemoptysis, and acute renal failure.  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   3)  CHANGES SUGGESTIVE OF FOCAL ACUTE PYELONEPHRITIS.   4)  MILD-TO-MODERATE ARTERIONEPHROSCLEROSIS   - Rheumatology consulted, appreciate evaluation and recommendations     - MITUL + 1:2560 homogenous, +dsDNA, normal complements     - PR3 1.2 (slight +),  (+)     - cANCA + 1:80, pANCA neg     - GBMAb neg, trace cryos  - Transfusion Medicine consulted for apheresis  - Nephrology consulted, see separate documentation for [facility]      Plan  - Steroids:    - completed IV methylprednisolone 1000mg x 3 days   - now on steroid taper as guided by Rheumatology   - currently methylprednisolone 20mg IV daily   - plan for 20mg IV daily on 11/6 for 14 days (last dose of 20mg equivalent is 11/20/2022).   - apheresis: underwent PLEX 10/26, 10/27, 10/30, 11/1, 11/2, 11/3  - rituximab every 7 days for 4 doses: Dose #1: 10/27, #2 11/3, next dose 11/10  - cyclophosphamide every 14 days for 2 doses: Dose #1 10/27, next dose 11/10  - Opportunistic Infection ppx: atovaquone 1500mg PO daily  - GI bleed prophylaxis: pantoprazole 40mg daily

## 2022-11-06 NOTE — NURSING
Patient is lying supine HOB elevated 35 degrees. Patient resting with eyes closed, RR even and unlabored. No signs or symptoms of pain noted or expressed at this time. Safety measures remain in place. Side rails up x2. Bed in lowest position and call light within reach.

## 2022-11-06 NOTE — SUBJECTIVE & OBJECTIVE
Interval History: No acute events overnight.  Feeding tube re-placed today and in good position.  The patient is awake and alert.  She is on RA.  Unknown why she is having difficulty swallowing.  She does complain of a nonproductive cough; no hemoptysis.  CXR demonstrates improved vascular markings.  NO consolidations.      Review of Systems   Constitutional:  Positive for activity change and fatigue. Negative for fever.   HENT:  Negative for sore throat and trouble swallowing.    Respiratory:  Positive for cough. Negative for shortness of breath.    Cardiovascular:  Negative for chest pain and leg swelling.   Gastrointestinal:  Negative for abdominal pain and nausea.   Genitourinary:  Negative for difficulty urinating.   Skin:  Negative for rash and wound.   Neurological:  Positive for weakness. Negative for headaches.   Psychiatric/Behavioral:  Negative for decreased concentration and sleep disturbance.    Objective:     Vital Signs (Most Recent):  Temp: 97.8 °F (36.6 °C) (11/06/22 1345)  Pulse: 89 (11/06/22 1458)  Resp: 14 (11/06/22 1458)  BP: (!) 180/83 (11/06/22 1400)  SpO2: 100 % (11/06/22 1458)   Vital Signs (24h Range):  Temp:  [97.8 °F (36.6 °C)-99 °F (37.2 °C)] 97.8 °F (36.6 °C)  Pulse:  [69-93] 89  Resp:  [13-21] 14  SpO2:  [95 %-100 %] 100 %  BP: (149-206)/(64-88) 180/83     Weight: 63 kg (138 lb 14.2 oz)  Body mass index is 26.24 kg/m².    Intake/Output Summary (Last 24 hours) at 11/6/2022 1711  Last data filed at 11/5/2022 2300  Gross per 24 hour   Intake 433.33 ml   Output 400 ml   Net 33.33 ml      Physical Exam  Vitals reviewed.   Constitutional:       General: She is awake.      Appearance: She is well-developed.   HENT:      Head: Normocephalic and atraumatic.      Mouth/Throat:      Mouth: Mucous membranes are dry.   Eyes:      General: Lids are normal. No scleral icterus.     Conjunctiva/sclera: Conjunctivae normal.   Cardiovascular:      Rate and Rhythm: Normal rate and regular rhythm.    Pulmonary:      Effort: Pulmonary effort is normal.      Breath sounds: Normal breath sounds. No wheezing or rhonchi.      Comments: Diminished right lower lobe breath sounds   Abdominal:      General: Bowel sounds are normal.      Palpations: Abdomen is soft.   Musculoskeletal:         General: No deformity.      Right lower leg: No edema.      Left lower leg: No edema.   Skin:     General: Skin is warm and dry.   Neurological:      General: No focal deficit present.      Mental Status: She is alert. Mental status is at baseline.   Psychiatric:         Attention and Perception: Attention normal.         Mood and Affect: Mood normal.       Significant Labs: All pertinent labs within the past 24 hours have been reviewed.  Blood Culture: No results for input(s): LABBLOO in the last 48 hours.  BMP:   Recent Labs   Lab 11/06/22 0439   GLU 81      K 3.8      CO2 20*   BUN 93*   CREATININE 4.2*   CALCIUM 7.8*   MG 1.8     CBC:   Recent Labs   Lab 11/05/22 0359 11/06/22 0439   WBC 19.07* 19.03*   HGB 6.9* 8.3*   HCT 21.3* 26.5*    154     CMP:   Recent Labs   Lab 11/05/22 0359 11/06/22 0439    144   K 4.2 3.8    108   CO2 24 20*    81   BUN 74* 93*   CREATININE 3.0* 4.2*   CALCIUM 8.1* 7.8*   PROT 5.1* 4.9*   ALBUMIN 3.0* 2.8*   BILITOT 0.6 0.9   ALKPHOS 59 63   AST 53* 37   ALT 49* 48*   ANIONGAP 11 16       Significant Imaging: I have reviewed all pertinent imaging results/findings within the past 24 hours.

## 2022-11-06 NOTE — ASSESSMENT & PLAN NOTE
Patient is a 78 yo F with chronic diastolic heart failure, HTN, OA, who is admitted for respiratory failure. Rheumatology is consulted due to concern for vasculitis. Patient presented with cough and hemoptysis since 9/24/22. She was admitted due to dyspnea and hypoxia on 10/17. She has had Hb drop to 6 this admission requiring blood transfusions. Reviewed her chest imaging which shows progressive disease from 10/14 until now which can be concerning for DAH. This might also explain the Hb drop. No bronch planned for now due to her tenuous respiratory status. GI defers invasive workup for now because she is not stable enough. She has had increasing creatinine from baseline of 1 to 3.0 on 10/23/22. 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   3)  CHANGES SUGGESTIVE OF FOCAL ACUTE PYELONEPHRITIS.   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     Treatment to date  - s/p IV Solumedrol 1000 mg x3 doses. Now following PEXIVAS steroid taper. Currently on IV Solumedrol 24 mg daily.  - PLEX 10/26, 10/27, 10/29, 10/30, 11/1, 11/2, 11/3 (prior to Rituxan)  - Got SLED 10/27. Bedside HD 10/30  - Rituximab 375 mg/m^2 (600 mg) on 10/27 (every 7 day dose 1 of 4), 11/3 (dose 2 of 4)  - Cyclophosphamide 7.5 mg/kg on 10/27 (every 14 day dose 1 of 2)    Recommendations:  1. Continue steroid taper per PEXIVAS trial as in the chart below.Continue start Solu-Medrol 20 mg IV  started on 11/6 for total of 14 days  2. Recommend obtaining chest xray with bump in white count and cough. Could have aspiration pna vs pneumonitis  3. Atovaquone 1500 mg and Protonix daily while on high dose steroids.  4. Next Rituximab 275 mg/m^2 due 11/10  5. Next cyclophosphamide 7.5 mg/kg due on November 10  6. Monitor CBC and CMP in 10-14 days after giving chemotherapy

## 2022-11-06 NOTE — SUBJECTIVE & OBJECTIVE
Interval History: sitting up in chair. Has been coughing more. No NGT in place    Current Facility-Administered Medications   Medication Frequency    0.9%  NaCl infusion (for blood administration) Q24H PRN    0.9%  NaCl infusion (for blood administration) Q24H PRN    0.9%  NaCl infusion (for blood administration) Q24H PRN    acetaminophen tablet 650 mg Q4H PRN    albuterol-ipratropium 2.5 mg-0.5 mg/3 mL nebulizer solution 3 mL Q4H PRN    aluminum-magnesium hydroxide-simethicone 200-200-20 mg/5 mL suspension 30 mL QID PRN    amLODIPine tablet 10 mg Daily    atovaquone 750 mg/5 mL oral liquid 1,500 mg Daily    bisacodyL suppository 10 mg Daily PRN    carvediloL tablet 12.5 mg BID    dextrose 10% bolus 125 mL PRN    dextrose 10% bolus 250 mL PRN    glucagon (human recombinant) injection 1 mg PRN    glucose chewable tablet 16 g PRN    glucose chewable tablet 24 g PRN    hydrALAZINE injection 10 mg Q6H PRN    hydrALAZINE tablet 25 mg Q8H    insulin aspart U-100 pen 1-10 Units Q6H PRN    levothyroxine tablet 25 mcg Before breakfast    lisinopriL tablet 40 mg Daily    melatonin tablet 6 mg Nightly PRN    methocarbamoL tablet 500 mg TID PRN    methylPREDNISolone sodium succinate injection 100 mg Daily    methylPREDNISolone sodium succinate injection 20 mg Daily    naloxone 0.4 mg/mL injection 0.02 mg PRN    ondansetron injection 4 mg Q8H PRN    pantoprazole injection 40 mg Daily    prochlorperazine injection Soln 5 mg Q6H PRN    QUEtiapine tablet 25 mg QHS    simethicone chewable tablet 80 mg QID PRN    sodium chloride 0.9% 250 mL flush bag 1 time in Clinic/HOD    sodium chloride 0.9% flush 10 mL PRN    sodium chloride 0.9% flush 10 mL PRN    sodium chloride 0.9% flush 5 mL PRN     Facility-Administered Medications Ordered in Other Encounters   Medication Frequency    celecoxib capsule 400 mg Once    fentaNYL 50 mcg/mL injection  mcg PRN    LIDOcaine (PF) 10 mg/ml (1%) injection 10 mg Once PRN    LIDOcaine (PF) 10  mg/ml (1%) injection 10 mg Once    midazolam (VERSED) 1 mg/mL injection 0.5-4 mg PRN    ropivacaine 0.2% [facility] PainPRO Pump infusion 500 ML Continuous     Objective:     Vital Signs (Most Recent):  Temp: 98.5 °F (36.9 °C) (11/06/22 0305)  Pulse: 73 (11/06/22 0305)  Resp: 13 (11/06/22 0305)  BP: (!) 152/70 (11/06/22 0305)  SpO2: 97 % (11/06/22 0305)  O2 Device (Oxygen Therapy): room air (11/05/22 2240)   Vital Signs (24h Range):  Temp:  [98.2 °F (36.8 °C)-99 °F (37.2 °C)] 98.5 °F (36.9 °C)  Pulse:  [68-93] 73  Resp:  [13-21] 13  SpO2:  [95 %-99 %] 97 %  BP: (149-168)/(64-77) 152/70     Weight: 63 kg (138 lb 14.2 oz) (11/03/22 1000)  Body mass index is 26.24 kg/m².  Body surface area is 1.65 meters squared.      Intake/Output Summary (Last 24 hours) at 11/6/2022 0921  Last data filed at 11/5/2022 2300  Gross per 24 hour   Intake 433.33 ml   Output 400 ml   Net 33.33 ml       Physical Exam   Constitutional: No distress.   HENT:   Head: Normocephalic and atraumatic.   Nose: No rhinorrhea or nasal congestion.   Mouth/Throat: Oropharynx is clear.   No tenderness to palpation of sinuses, nasal cartilage   Ears: No tenderness to palpation     Eyes: Pupils are equal, round, and reactive to light.   Cardiovascular: Normal rate and regular rhythm.   No murmur heard.  Pulmonary/Chest: Effort normal. No respiratory distress. She has no wheezes. She has rales (RLL).   Abdominal: Soft. Bowel sounds are normal. There is no abdominal tenderness.   Musculoskeletal:         General: No deformity. Normal range of motion.      Cervical back: Normal range of motion.   Neurological: She is alert.   Skin: Skin is warm and dry.   Psychiatric: Affect and judgment normal.     Significant Labs:  All pertinent lab results from the last 24 hours have been reviewed.    Significant Imaging:  Imaging results within the past 24 hours have been reviewed.

## 2022-11-06 NOTE — PROGRESS NOTES
"[facility] ([place] [address])  Rheumatology  Progress Note    Patient Name: Kristin Goodman  MRN: [id number]  Admission Date: 10/17/2022  Hospital Length of Stay: 20 days  Code Status: Full Code   Attending Provider: Susi Urbina MD  Primary Care Physician: Lori Hernandez MD  Principal Problem: Microscopic polyangiitis    Subjective:     HPI: Patient is a 76 yo F with chronic diastolic heart failure, HTN, OA, who is admitted for respiratory failure. Rheumatology is consulted due to concern for vasculitis. Patient initially presented to the ED on 9/24/22 complaining of a week of cough followed by an episode of hemoptysis on 9/24 prompting the ED visit. They did a CTA which showed multifocal PNA. She was sent home with Levaquin. She followed up with PCP on 10/4/22 and was feeling better. Then she presented to the ED again on 10/14 and on 10/17 complaining of dyspnea. She had fevers in the few days leading up to admission of 102. She endorsed weakness, productive cough, dyspnea, and loss of appetite. Pt initally at 88% O2 saturation and improved to 98% on 2L NC. She was admitted for IV antibiotics. CT chest with contrast on 10/17 showed evidence of interval progression of bilateral perihilar dense consolidation with peripheral patchy areas of ground-glass opacification nonspecific as to the etiology.  Bilateral pneumonia as well as inflammatory etiologies considered.  Cardiogenic pulmonary edema considered less likely given the pattern.    On 10/19/22 she started having melena. Hb dropped to 6. She got 2 units pRBCs. GI was consulted. They noted "Colonoscopy in 2020 showed internal hemorrhoids and mild sigmoid diverticulosis. EGD in 2012 showed gastritis, biopsies positive for H. Pylori." "We considered doing EGD now, but from a pulmonary standpoint I do not think she is stable enough with the current pneumonia, therefore would recommend that we just follow the patient, treat with a PPI in case there " "is any peptic ulcer disease."    On 10/21/22 ENT was consulted due to left sided otalgia the day prior which resolved. She also was complaining of cervical adenopathy and sore throat. They did not recommend surgical intervention and felt that adenopathy was likely reactive.     On 10/23/22 ID was consulted. They recommended checking respiratory infection panel and fungal markers.    On 10/23/22 Nephrology was consulted due to worsening creatinine. They noted, "Patient is a noted to have baseline creatinine of 1 as recent as 8/2022 but progressive worsening of creatinine in early October.  On admission patient with creatinine of 1.6 (10/17) with subsequent progression and creatinine of 3.0 at time of consultation (10/23).  Nephrology consulted for acute kidney injury." "Patient with ATN nephritic picture in urine sediment, prot/crea ratio pending. Had hematuria in recent past but in context with positive urine cultures. Now definitely more dysmorphic red cells that wbcs in the urine. 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    On 10/23/22, she was transferred to ICU due to desaturations and respiratory distress. Pulmonologist noted that her respiratory status is too tenuous for bronchoscopy. She was stabilized with non-invasive ventilation and nasal cannula oxygen.      Interval History: sitting up in chair. Has been coughing more. No NGT in place    Current Facility-Administered Medications   Medication Frequency    0.9%  NaCl infusion (for blood administration) Q24H PRN    0.9%  NaCl infusion (for blood administration) Q24H PRN    0.9%  NaCl infusion (for blood administration) Q24H PRN    acetaminophen tablet 650 mg Q4H PRN    albuterol-ipratropium 2.5 mg-0.5 mg/3 mL nebulizer solution 3 mL Q4H PRN    aluminum-magnesium hydroxide-simethicone 200-200-20 mg/5 mL suspension 30 mL QID PRN    " amLODIPine tablet 10 mg Daily    atovaquone 750 mg/5 mL oral liquid 1,500 mg Daily    bisacodyL suppository 10 mg Daily PRN    carvediloL tablet 12.5 mg BID    dextrose 10% bolus 125 mL PRN    dextrose 10% bolus 250 mL PRN    glucagon (human recombinant) injection 1 mg PRN    glucose chewable tablet 16 g PRN    glucose chewable tablet 24 g PRN    hydrALAZINE injection 10 mg Q6H PRN    hydrALAZINE tablet 25 mg Q8H    insulin aspart U-100 pen 1-10 Units Q6H PRN    levothyroxine tablet 25 mcg Before breakfast    lisinopriL tablet 40 mg Daily    melatonin tablet 6 mg Nightly PRN    methocarbamoL tablet 500 mg TID PRN    methylPREDNISolone sodium succinate injection 100 mg Daily    methylPREDNISolone sodium succinate injection 20 mg Daily    naloxone 0.4 mg/mL injection 0.02 mg PRN    ondansetron injection 4 mg Q8H PRN    pantoprazole injection 40 mg Daily    prochlorperazine injection Soln 5 mg Q6H PRN    QUEtiapine tablet 25 mg QHS    simethicone chewable tablet 80 mg QID PRN    sodium chloride 0.9% 250 mL flush bag 1 time in Clinic/HOD    sodium chloride 0.9% flush 10 mL PRN    sodium chloride 0.9% flush 10 mL PRN    sodium chloride 0.9% flush 5 mL PRN     Facility-Administered Medications Ordered in Other Encounters   Medication Frequency    celecoxib capsule 400 mg Once    fentaNYL 50 mcg/mL injection  mcg PRN    LIDOcaine (PF) 10 mg/ml (1%) injection 10 mg Once PRN    LIDOcaine (PF) 10 mg/ml (1%) injection 10 mg Once    midazolam (VERSED) 1 mg/mL injection 0.5-4 mg PRN    ropivacaine 0.2% [facility] PainPRO Pump infusion 500 ML Continuous     Objective:     Vital Signs (Most Recent):  Temp: 98.5 °F (36.9 °C) (11/06/22 0305)  Pulse: 73 (11/06/22 0305)  Resp: 13 (11/06/22 0305)  BP: (!) 152/70 (11/06/22 0305)  SpO2: 97 % (11/06/22 0305)  O2 Device (Oxygen Therapy): room air (11/05/22 2240)   Vital Signs (24h Range):  Temp:  [98.2 °F (36.8 °C)-99 °F (37.2 °C)] 98.5 °F (36.9 °C)  Pulse:  [68-93] 73  Resp:  [13-21]  13  SpO2:  [95 %-99 %] 97 %  BP: (149-168)/(64-77) 152/70     Weight: 63 kg (138 lb 14.2 oz) (11/03/22 1000)  Body mass index is 26.24 kg/m².  Body surface area is 1.65 meters squared.      Intake/Output Summary (Last 24 hours) at 11/6/2022 0921  Last data filed at 11/5/2022 2300  Gross per 24 hour   Intake 433.33 ml   Output 400 ml   Net 33.33 ml       Physical Exam   Constitutional: No distress.   HENT:   Head: Normocephalic and atraumatic.   Nose: No rhinorrhea or nasal congestion.   Mouth/Throat: Oropharynx is clear.   No tenderness to palpation of sinuses, nasal cartilage   Ears: No tenderness to palpation     Eyes: Pupils are equal, round, and reactive to light.   Cardiovascular: Normal rate and regular rhythm.   No murmur heard.  Pulmonary/Chest: Effort normal. No respiratory distress. She has no wheezes. She has rales (RLL).   Abdominal: Soft. Bowel sounds are normal. There is no abdominal tenderness.   Musculoskeletal:         General: No deformity. Normal range of motion.      Cervical back: Normal range of motion.   Neurological: She is alert.   Skin: Skin is warm and dry.   Psychiatric: Affect and judgment normal.     Significant Labs:  All pertinent lab results from the last 24 hours have been reviewed.    Significant Imaging:  Imaging results within the past 24 hours have been reviewed.    Assessment/Plan:     Acute hypoxemic respiratory failure  Patient is a 76 yo F with chronic diastolic heart failure, HTN, OA, who is admitted for respiratory failure. Rheumatology is consulted due to concern for vasculitis. Patient presented with cough and hemoptysis since 9/24/22. She was admitted due to dyspnea and hypoxia on 10/17. She has had Hb drop to 6 this admission requiring blood transfusions. Reviewed her chest imaging which shows progressive disease from 10/14 until now which can be concerning for DAH. This might also explain the Hb drop. No bronch planned for now due to her tenuous respiratory status. GI  defers invasive workup for now because she is not stable enough. She has had increasing creatinine from baseline of 1 to 3.0 on 10/23/22. 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   3)  CHANGES SUGGESTIVE OF FOCAL ACUTE PYELONEPHRITIS.   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     Treatment to date  - s/p IV Solumedrol 1000 mg x3 doses. Now following PEXIVAS steroid taper. Currently on IV Solumedrol 24 mg daily.  - PLEX 10/26, 10/27, 10/29, 10/30, 11/1, 11/2, 11/3 (prior to Rituxan)  - Got SLED 10/27. Bedside HD 10/30  - Rituximab 375 mg/m^2 (600 mg) on 10/27 (every 7 day dose 1 of 4), 11/3 (dose 2 of 4)  - Cyclophosphamide 7.5 mg/kg on 10/27 (every 14 day dose 1 of 2)    Recommendations:  Continue steroid taper per PEXIVAS trial as in the chart below.Continue start Solu-Medrol 20 mg IV started on 11/6 for total of 14 days  Recommend obtaining chest xray with bump in white count and cough. Could have aspiration pna vs pneumonitis  Atovaquone 1500 mg and Protonix daily while on high dose steroids.  Next Rituximab 275 mg/m^2 due 11/10  Next cyclophosphamide 7.5 mg/kg due on November 10  Monitor CBC and CMP in 10-14 days after giving chemotherapy              Thank you for this consult. These are preliminary recommendations. Please follow attending recommendations. Please message with any questions or concerns.         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 cause   CK normal  CT head remote lacunar infarct, no new CVA to explain dysphagia  Leukocytosis increasing again, new cough, and crackles RLL suspect aspiration given severe dysphagia, NG tube out x 2 days     Chest x-ray today  Blood cultures  Sputum culture  decrease Solu-Medrol to  20mg IV daily starting today  for 2 wks using   Pexivas tapering schedule  Rituximab 375mg IV next dose(#3)11/10/22  Cyclophosphamide 750mg/kg IV next dose(#2) 11/10/22  PLEX x 7  completed 11/3/22  ? avacopan  Atovaquone 1500 mg daily  Pantoprazole  *Will need Evusheld given rituximab  F/u Speech Therapy for dysphagia: NPO, oral care strict aspiration precautions,  Appreciate ENT evaluation of dysphagia     Jason Woods MD  Rheumatology  [phone]     Propranolol Counseling:  I discussed with the patient the risks of propranolol including but not limited to low heart rate, low blood pressure, low blood sugar, restlessness and increased cold sensitivity. They should call the office if they experience any of these side effects.

## 2022-11-06 NOTE — PROGRESS NOTES
[facility] - [facility] ([address] Medicine  Progress Note    Patient Name: Kristin Goodman  MRN: [id number]  Patient Class: IP- Inpatient   Admission Date: 10/17/2022  Length of Stay: 20 days  Attending Physician: Susi Urbina MD  Primary Care Provider: Lori Hernandez MD        Subjective:     Principal Problem:Microscopic polyangiitis        HPI:  Kristin Goodman is a 77 y.o. female with a past medical history of HTN, hypothyroidism, HFpEF, and osteoarthritis of the arm who has presented to the ED for cough, SOB, and weakness. Daughter is present at the bedside. Patient presented to the ED on 9/24 with hemoptysis, CT and CXR showed high suspicion for multilobar pneumonia. Patient was discharged with a 10-day course of levofloxacin and an albuterol inhaler. Patient followed up with her PCP on 10/4 with slight improvement in symptoms and went back to work. Patient continued to have worsening symptoms and presented to the ED again on 10/14 for evaluation and no interventions were done. Patient endorses fevers over the last few days up to 102.4 F with progressive SOB. She endorses hemoptysis with moderate amount of blood, generalized weakness, productive cough, SOB, and loss of appetite. Denies chest pain, nausea, vomiting, abdominal pain, or urinary changes.    ED: hypertensive up to 219/93 and tachycardic up to 117. Oxygen saturation on 92% on RA, placed on 5L NC with sats >97%. CBC remarkable for leukocytosis of 16.03 and Hb 7.7. K 3.3. Cr 1.6, baseline ~1.0. . Troponin 0.061. COVID and flu negative. EKG NSR. CT chest with contrast pending at time of admission. Given home amlodipine and lisinopril. Given 500mL NS, IV azithromycin, cefepime and vanc.       Overview/Hospital Course:  Ms. Goodman was admitted to Hospital Medicine for management of multifocal pneumonia and acute hypoxemic respiratory failure after presenting to the ED with fevers, cough, hemoptysis, and dyspnea. She required  escalation to the Medical ICU due to abrupt decline in her respiratory status, associated with hemoptysis and requiring NIPPV. CT chest showed bilateral patchy ground glass opacities. Labs additionally were notable for acute renal failure on CKD3. Pulmonology was consulted while under the care of [facility] and felt this picture was consistent with diffuse alveolar hemorrhage.     Her workup revealed a strongly positive MPO Ab consistent with clinical picture of microscopic polyangiitis with pulmonary and renal involvement. She underwent Trialysis catheter placement 10/25/2022 in the Medical ICU. 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     Rheumatology was consulted, extensive workup revealed MITUL + 1:2560 homogenous, +dsDNA, normal complements, PR3 1.2 (slight +),  (+), cANCA + 1:80, pANCA neg, GBMAb neg, trace cryos. Due to concern for MPA, she was started on pulse dose IV methylprednisolone 1000mg for 3 days, and plasmapheresis under the guidance of Transfusion Medicine. She remains on a steroid taper and has completed PLEX. She additionally received rituximab and cyclophosphamide. OI prophylaxis was provided with atovaquone due to a sulfa allergy.     Nephrology was consulted for evaluation of acute renal failure in the setting of MPA. She did require SLED in the ICU for renal clearance and remains on intermittent hemodialysis.     Her acute hypoxemic respiratory failure improved and she was weaned off of supplemental oxygen. She stepped down to Hospital Medicine 10/28.     She underwent MBBS for evaluation of dysphagia, which showed global delayed initiation of swallow and aspiration with thin liquids. Due to concern for possible prior stroke, head imaging  was completed. She is NPO with NG tube. ENT was consulted for evaluation of dysphagia and cervical adenopathy.     Her other chronic medical conditions including hypothyroidism, diastolic CHF, and hypertension were managed with her home medications, with dose adjustments as needed.         Interval History: No acute events overnight.  Feeding tube re-placed today and in good position.  The patient is awake and alert.  She is on RA.  Unknown why she is having difficulty swallowing.  She does complain of a nonproductive cough; no hemoptysis.  CXR demonstrates improved vascular markings.  NO consolidations.      Review of Systems   Constitutional:  Positive for activity change and fatigue. Negative for fever.   HENT:  Negative for sore throat and trouble swallowing.    Respiratory:  Positive for cough. Negative for shortness of breath.    Cardiovascular:  Negative for chest pain and leg swelling.   Gastrointestinal:  Negative for abdominal pain and nausea.   Genitourinary:  Negative for difficulty urinating.   Skin:  Negative for rash and wound.   Neurological:  Positive for weakness. Negative for headaches.   Psychiatric/Behavioral:  Negative for decreased concentration and sleep disturbance.    Objective:     Vital Signs (Most Recent):  Temp: 97.8 °F (36.6 °C) (11/06/22 1345)  Pulse: 89 (11/06/22 1458)  Resp: 14 (11/06/22 1458)  BP: (!) 180/83 (11/06/22 1400)  SpO2: 100 % (11/06/22 1458)   Vital Signs (24h Range):  Temp:  [97.8 °F (36.6 °C)-99 °F (37.2 °C)] 97.8 °F (36.6 °C)  Pulse:  [69-93] 89  Resp:  [13-21] 14  SpO2:  [95 %-100 %] 100 %  BP: (149-206)/(64-88) 180/83     Weight: 63 kg (138 lb 14.2 oz)  Body mass index is 26.24 kg/m².    Intake/Output Summary (Last 24 hours) at 11/6/2022 1711  Last data filed at 11/5/2022 2300  Gross per 24 hour   Intake 433.33 ml   Output 400 ml   Net 33.33 ml      Physical Exam  Vitals reviewed.   Constitutional:       General: She is awake.      Appearance: She is well-developed.    HENT:      Head: Normocephalic and atraumatic.      Mouth/Throat:      Mouth: Mucous membranes are dry.   Eyes:      General: Lids are normal. No scleral icterus.     Conjunctiva/sclera: Conjunctivae normal.   Cardiovascular:      Rate and Rhythm: Normal rate and regular rhythm.   Pulmonary:      Effort: Pulmonary effort is normal.      Breath sounds: Normal breath sounds. No wheezing or rhonchi.      Comments: Diminished right lower lobe breath sounds   Abdominal:      General: Bowel sounds are normal.      Palpations: Abdomen is soft.   Musculoskeletal:         General: No deformity.      Right lower leg: No edema.      Left lower leg: No edema.   Skin:     General: Skin is warm and dry.   Neurological:      General: No focal deficit present.      Mental Status: She is alert. Mental status is at baseline.   Psychiatric:         Attention and Perception: Attention normal.         Mood and Affect: Mood normal.       Significant Labs: All pertinent labs within the past 24 hours have been reviewed.  Blood Culture: No results for input(s): LABBLOO in the last 48 hours.  BMP:   Recent Labs   Lab 11/06/22  0439   GLU 81      K 3.8      CO2 20*   BUN 93*   CREATININE 4.2*   CALCIUM 7.8*   MG 1.8     CBC:   Recent Labs   Lab 11/05/22  0359 11/06/22  0439   WBC 19.07* 19.03*   HGB 6.9* 8.3*   HCT 21.3* 26.5*    154     CMP:   Recent Labs   Lab 11/05/22 0359 11/06/22  0439    144   K 4.2 3.8    108   CO2 24 20*    81   BUN 74* 93*   CREATININE 3.0* 4.2*   CALCIUM 8.1* 7.8*   PROT 5.1* 4.9*   ALBUMIN 3.0* 2.8*   BILITOT 0.6 0.9   ALKPHOS 59 63   AST 53* 37   ALT 49* 48*   ANIONGAP 11 16       Significant Imaging: I have reviewed all pertinent imaging results/findings within the past 24 hours.      Assessment/Plan:      * Microscopic polyangiitis  As of 10/26/22 strongly positive MPO Ab (in contrast to borderline positive PR3), consistent with clinical picture of microscopic  polyangiitis with pulmonary, and renal involvement after presenting with acute hypoxemic respiratory failure, hemoptysis, and acute renal failure.  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   3)  CHANGES SUGGESTIVE OF FOCAL ACUTE PYELONEPHRITIS.   4)  MILD-TO-MODERATE ARTERIONEPHROSCLEROSIS   - Rheumatology consulted, appreciate evaluation and recommendations     - MITUL + 1:2560 homogenous, +dsDNA, normal complements     - PR3 1.2 (slight +),  (+)     - cANCA + 1:80, pANCA neg     - GBMAb neg, trace cryos  - Transfusion Medicine consulted for apheresis  - Nephrology consulted, see separate documentation for WILFREDO      Plan  - Steroids:    - completed IV methylprednisolone 1000mg x 3 days   - now on steroid taper as guided by Rheumatology   - currently methylprednisolone 20mg IV daily   - plan for 20mg IV daily on 11/6 for 14 days (last dose of 20mg equivalent is 11/20/2022).   - apheresis: underwent PLEX 10/26, 10/27, 10/30, 11/1, 11/2, 11/3  - rituximab every 7 days for 4 doses: Dose #1: 10/27, #2 11/3, next dose 11/10  - cyclophosphamide every 14 days for 2 doses: Dose #1 10/27, next dose 11/10  - Opportunistic Infection ppx: atovaquone 1500mg PO daily  - GI bleed prophylaxis: pantoprazole 40mg daily     Acute hypoxemic respiratory failure  Multifocal pneumonia  Hemoptysis  Dyspnea  Diffuse Alveolar Hemorrahge  In the setting of a new diagnosis of microscopic polyangiitis, presented with fevers, dyspnea,.  - Chest imaging was consistent with diffuse alveolar hemorrhage.  CT chest wc 10/17  with bl perihilar dense consolidations w/ peripheral patcy areas of GGO, BL PNA, less c/f cardiogenic pulmonary edema.  - Patient upgraded to Medical ICU 10/23/22 with abrupt respiratory decline requiring NIPPV, improved with high dose IV steroids, plasmapheresis, emergent hemodialysis.   - Unable to perform bronchoscopy in the Medical ICU due to tenuous respiratory status  - As of 11/6, hypoxemia has significantly improved    Plan:  - continue management of hypoxemia with supplemental oxygen  - continue management of underlying triggers with steroid and immunosuppressants  - Wean supplemental O2 as tolerated  - incentive spirometry and respiratory hygiene    Acute renal failure superimposed on stage 3 chronic kidney disease  Due to microscopic polyangiitis. Remains on hemodialysis intermittently.   - Baseline creatinine 1.0-1.2.   - New onset proteinuria/hematuria.   - Renal biopsy completed and   - Trialysis in place for intermittent Hemodialysis    Plan  - Nephrology consulted, appreciate management  - management of MPA as noted separately  - Renal function panel daily  - renally dose all medications  - intermittent Hemodialysis as indicated, per Nephrology     Acute blood loss anemia  In the setting of GI bleed with melena  - Evaluated by GI, see GI bleed documentation  - Last transfusion 10/28, Hgb slowly trending down since then  - Hgb 6.9 on 11/5      Plan  - Monitor CBC Daily   - Treat with pRBC transfusion PRN Hgb <7  - qualifies for transfusion on 11/5 with Hgb 6.9, 1u pRBC ordered      Gastrointestinal hemorrhage with melena  Starting having hemoptysis and melena with associated acute blood loss anemia earlier in hospital stay. Patient with known internal hemorrhoids, mild sigmoid diverticulosis, history of gastritis and + H pylori (2012 EGD).   - GI consulted 10/19, unable to perform EGD due to instability and respiratory failure at the time    Plan  - patient unable to take PO PPI due to NGT removal on  11/5, transition to IV PPI 40mg pantoprazole daily, return to per NG tube once replaced  - Monitor CBC closely for signs of recurrent bleed          Dysphagia  SLP following  MBSS showing global weakness in swallowing as well as aspiration with thin liquids.   ENT consulted. Followup recs  NG tube clogged and removed 11/4, unable to be reinserted due to pain    Plan  - Essential medications transitioned temporarily to IV formulation on 11/5  - Appreciate RN re-attempt when patient able to tolerate NG tube placement  - If unable to tolerate NG tube re-attempt, will need to consult Gastroenterology for consideration of endoscopic placement.    - Discussed case with Rheumatology team, they are concerned that this dysphagia may have been from prior stroke, requesting imaging for evaluation - have graciously ordered head imaging on behalf of this patient - will review results with consultants and patient/family once completed.       Chronic diastolic heart failure  Pulmonary Hypertension due to left heart disease  TTE (10/2022) EF 65%, G1DD  Home meds: Lisinopril, Toprol     Plan  - Active volume control with intermittent Hemodialysis per Nephrology   - Daily weights (standing if tolerated)  - Strict I/Os  - Fluid restriction 1.5 day  - Cardiac diet w/ 2 g Na restriction      Lymphadenopathy of head and neck  - Has bilateral neck gland swelling with tenderness,consulted ENT  - No surgical intervention indicated   - Adenopathy likely reactive in nature   - Recommend further workup if it does not resolve within next 2 weeks     Moderate malnutrition  Nutrition consulted. Most recent weight and BMI monitored-          Malnutrition (Moderate to Severe)  Weight Loss (Malnutrition): 7.5% in 3 months              Measurements:  Wt Readings from Last 1 Encounters:   11/03/22 63 kg (138 lb 14.2 oz)   Body mass index is 26.24 kg/m².    Recommendations: Recommendation/Intervention: 1. Continue current TF regimen of Novasource @ 35  mL/hr - meeting needs. 2. RD to monitor & follow-up.  Goals: Meet % EEN, EPN by RD f/u date    Patient has been screened and assessed by RD. RD will follow patient.      Primary hypertension  BP Readings from Last 1 Encounters:   11/05/22 (!) 159/69     - Patient is unable to tolerate PO mediations, all meds to be administered via NG tube    amLODIPine tablet 10 mg, 10 mg, Per NG tube, Daily    carvediloL tablet 12.5 mg, 12.5 mg, Per NG tube, BID    hydrALAZINE tablet 25 mg, 25 mg, Per NG tube, Q8H    lisinopriL tablet 40 mg, 40 mg, Per NG tube, Daily    Plan update:  - NG unable to be replaced temporarily on 11/5, transition to IV formulation until replaced    hydrALAZINE injection 10 mg, 10 mg, Intravenous, Q6H PRN SBP >160  - Vitals Q4hr    Hypothyroid  - Continue synthroid 25 mcg  - TSH 0.585 10/27/22      VTE Risk Mitigation (From admission, onward)         Ordered     Place sequential compression device  Until discontinued         10/25/22 1731     IP VTE HIGH RISK PATIENT  Once         10/17/22 1354     Reason for No Pharmacological VTE Prophylaxis  Once        Question:  Reasons:  Answer:  Risk of Bleeding    10/17/22 1354                Discharge Planning   ANTHONY: 11/7/2022     Code Status: Full Code   Is the patient medically ready for discharge?: No    Reason for patient still in hospital (select all that apply): Patient trending condition, Laboratory test, Treatment and Consult recommendations  Discharge Plan A: Home with family                  Susi Urbina MD  Department of Hospital Medicine   [facility] - Intensive Care ([place]-14)

## 2022-11-07 LAB
ALBUMIN SERPL BCP-MCNC: 2.9 G/DL (ref 3.5–5.2)
ALP SERPL-CCNC: 79 U/L (ref 55–135)
ALT SERPL W/O P-5'-P-CCNC: 41 U/L (ref 10–44)
ANION GAP SERPL CALC-SCNC: 15 MMOL/L (ref 8–16)
AST SERPL-CCNC: 30 U/L (ref 10–40)
BASOPHILS # BLD AUTO: 0.01 K/UL (ref 0–0.2)
BASOPHILS NFR BLD: 0.1 % (ref 0–1.9)
BILIRUB SERPL-MCNC: 0.6 MG/DL (ref 0.1–1)
BUN SERPL-MCNC: 106 MG/DL (ref 8–23)
CALCIUM SERPL-MCNC: 8.1 MG/DL (ref 8.7–10.5)
CHLORIDE SERPL-SCNC: 107 MMOL/L (ref 95–110)
CO2 SERPL-SCNC: 19 MMOL/L (ref 23–29)
CREAT SERPL-MCNC: 4.8 MG/DL (ref 0.5–1.4)
DIFFERENTIAL METHOD: ABNORMAL
EOSINOPHIL # BLD AUTO: 0.1 K/UL (ref 0–0.5)
EOSINOPHIL NFR BLD: 0.4 % (ref 0–8)
ERYTHROCYTE [DISTWIDTH] IN BLOOD BY AUTOMATED COUNT: 16.6 % (ref 11.5–14.5)
EST. GFR  (NO RACE VARIABLE): 8.9 ML/MIN/1.73 M^2
GLUCOSE SERPL-MCNC: 113 MG/DL (ref 70–110)
HCT VFR BLD AUTO: 26.4 % (ref 37–48.5)
HGB BLD-MCNC: 8.6 G/DL (ref 12–16)
IMM GRANULOCYTES # BLD AUTO: 0.09 K/UL (ref 0–0.04)
IMM GRANULOCYTES NFR BLD AUTO: 0.6 % (ref 0–0.5)
LYMPHOCYTES # BLD AUTO: 0.8 K/UL (ref 1–4.8)
LYMPHOCYTES NFR BLD: 5.8 % (ref 18–48)
MAGNESIUM SERPL-MCNC: 1.9 MG/DL (ref 1.6–2.6)
MCH RBC QN AUTO: 29.1 PG (ref 27–31)
MCHC RBC AUTO-ENTMCNC: 32.6 G/DL (ref 32–36)
MCV RBC AUTO: 89 FL (ref 82–98)
MONOCYTES # BLD AUTO: 0.7 K/UL (ref 0.3–1)
MONOCYTES NFR BLD: 5.1 % (ref 4–15)
NEUTROPHILS # BLD AUTO: 12.3 K/UL (ref 1.8–7.7)
NEUTROPHILS NFR BLD: 88 % (ref 38–73)
NRBC BLD-RTO: 0 /100 WBC
PHOSPHATE SERPL-MCNC: 7.2 MG/DL (ref 2.7–4.5)
PLATELET # BLD AUTO: 175 K/UL (ref 150–450)
PMV BLD AUTO: 12.4 FL (ref 9.2–12.9)
POCT GLUCOSE: 134 MG/DL (ref 70–110)
POCT GLUCOSE: 140 MG/DL (ref 70–110)
POCT GLUCOSE: 196 MG/DL (ref 70–110)
POCT GLUCOSE: 197 MG/DL (ref 70–110)
POTASSIUM SERPL-SCNC: 4 MMOL/L (ref 3.5–5.1)
PROT SERPL-MCNC: 5.3 G/DL (ref 6–8.4)
RBC # BLD AUTO: 2.96 M/UL (ref 4–5.4)
SODIUM SERPL-SCNC: 141 MMOL/L (ref 136–145)
WBC # BLD AUTO: 14.01 K/UL (ref 3.9–12.7)

## 2022-11-07 PROCEDURE — 63600175 PHARM REV CODE 636 W HCPCS: Performed by: INTERNAL MEDICINE

## 2022-11-07 PROCEDURE — 25000003 PHARM REV CODE 250

## 2022-11-07 PROCEDURE — 99232 PR SUBSEQUENT HOSPITAL CARE,LEVL II: ICD-10-PCS | Mod: ,,, | Performed by: HOSPITALIST

## 2022-11-07 PROCEDURE — 97535 SELF CARE MNGMENT TRAINING: CPT

## 2022-11-07 PROCEDURE — 92526 ORAL FUNCTION THERAPY: CPT

## 2022-11-07 PROCEDURE — 94660 CPAP INITIATION&MGMT: CPT

## 2022-11-07 PROCEDURE — 97530 THERAPEUTIC ACTIVITIES: CPT

## 2022-11-07 PROCEDURE — 94761 N-INVAS EAR/PLS OXIMETRY MLT: CPT

## 2022-11-07 PROCEDURE — 83735 ASSAY OF MAGNESIUM: CPT

## 2022-11-07 PROCEDURE — 99900035 HC TECH TIME PER 15 MIN (STAT)

## 2022-11-07 PROCEDURE — 25000003 PHARM REV CODE 250: Performed by: HOSPITALIST

## 2022-11-07 PROCEDURE — 36415 COLL VENOUS BLD VENIPUNCTURE: CPT

## 2022-11-07 PROCEDURE — 20600001 HC STEP DOWN PRIVATE ROOM

## 2022-11-07 PROCEDURE — 99233 PR SUBSEQUENT HOSPITAL CARE,LEVL III: ICD-10-PCS | Mod: ,,, | Performed by: INTERNAL MEDICINE

## 2022-11-07 PROCEDURE — 25000003 PHARM REV CODE 250: Performed by: STUDENT IN AN ORGANIZED HEALTH CARE EDUCATION/TRAINING PROGRAM

## 2022-11-07 PROCEDURE — 99232 SBSQ HOSP IP/OBS MODERATE 35: CPT | Mod: ,,, | Performed by: HOSPITALIST

## 2022-11-07 PROCEDURE — C9113 INJ PANTOPRAZOLE SODIUM, VIA: HCPCS | Performed by: INTERNAL MEDICINE

## 2022-11-07 PROCEDURE — 85025 COMPLETE CBC W/AUTO DIFF WBC: CPT

## 2022-11-07 PROCEDURE — 99233 SBSQ HOSP IP/OBS HIGH 50: CPT | Mod: ,,, | Performed by: INTERNAL MEDICINE

## 2022-11-07 PROCEDURE — 80053 COMPREHEN METABOLIC PANEL: CPT

## 2022-11-07 PROCEDURE — 84100 ASSAY OF PHOSPHORUS: CPT

## 2022-11-07 PROCEDURE — 97530 THERAPEUTIC ACTIVITIES: CPT | Mod: CQ

## 2022-11-07 RX ORDER — CLONIDINE HYDROCHLORIDE 0.1 MG/1
0.1 TABLET ORAL EVERY 6 HOURS PRN
Status: DISCONTINUED | OUTPATIENT
Start: 2022-11-07 | End: 2022-12-03

## 2022-11-07 RX ORDER — HYDRALAZINE HYDROCHLORIDE 50 MG/1
100 TABLET, FILM COATED ORAL EVERY 8 HOURS
Status: DISCONTINUED | OUTPATIENT
Start: 2022-11-07 | End: 2022-12-03

## 2022-11-07 RX ORDER — CARVEDILOL 25 MG/1
25 TABLET ORAL 2 TIMES DAILY
Status: DISCONTINUED | OUTPATIENT
Start: 2022-11-07 | End: 2022-12-03

## 2022-11-07 RX ORDER — HYDRALAZINE HYDROCHLORIDE 50 MG/1
50 TABLET, FILM COATED ORAL EVERY 8 HOURS
Status: DISCONTINUED | OUTPATIENT
Start: 2022-11-07 | End: 2022-11-07

## 2022-11-07 RX ADMIN — ACETAMINOPHEN 650 MG: 325 TABLET ORAL at 03:11

## 2022-11-07 RX ADMIN — HYDRALAZINE HYDROCHLORIDE 10 MG: 20 INJECTION, SOLUTION INTRAMUSCULAR; INTRAVENOUS at 05:11

## 2022-11-07 RX ADMIN — HYDRALAZINE HYDROCHLORIDE 100 MG: 50 TABLET ORAL at 04:11

## 2022-11-07 RX ADMIN — LISINOPRIL 40 MG: 20 TABLET ORAL at 08:11

## 2022-11-07 RX ADMIN — HYDRALAZINE HYDROCHLORIDE 10 MG: 20 INJECTION, SOLUTION INTRAMUSCULAR; INTRAVENOUS at 01:11

## 2022-11-07 RX ADMIN — CARVEDILOL 12.5 MG: 12.5 TABLET, FILM COATED ORAL at 08:11

## 2022-11-07 RX ADMIN — LEVOTHYROXINE SODIUM 25 MCG: 25 TABLET ORAL at 05:11

## 2022-11-07 RX ADMIN — HYDRALAZINE HYDROCHLORIDE 25 MG: 25 TABLET, FILM COATED ORAL at 05:11

## 2022-11-07 RX ADMIN — AMLODIPINE BESYLATE 10 MG: 10 TABLET ORAL at 08:11

## 2022-11-07 RX ADMIN — PANTOPRAZOLE SODIUM 40 MG: 40 INJECTION, POWDER, FOR SOLUTION INTRAVENOUS at 08:11

## 2022-11-07 RX ADMIN — CARVEDILOL 25 MG: 25 TABLET, FILM COATED ORAL at 09:11

## 2022-11-07 RX ADMIN — METHYLPREDNISOLONE SODIUM SUCCINATE 20 MG: 40 INJECTION, POWDER, FOR SOLUTION INTRAMUSCULAR; INTRAVENOUS at 08:11

## 2022-11-07 RX ADMIN — ATOVAQUONE 1500 MG: 750 SUSPENSION ORAL at 08:11

## 2022-11-07 RX ADMIN — QUETIAPINE FUMARATE 25 MG: 25 TABLET ORAL at 09:11

## 2022-11-07 RX ADMIN — HYDRALAZINE HYDROCHLORIDE 100 MG: 50 TABLET ORAL at 09:11

## 2022-11-07 NOTE — AI DETERIORATION ALERT
"RAPID RESPONSE NURSE AI ALERT       AI alert received.    Chart Reviewed: 11/07/2022, 6:52 AM    MRN: [id number]  Bed: 19125/21142 A    Dx: Microscopic polyangiitis    Kristin Goodman has a past medical history of Acute blood loss anemia, Acute hypoxemic respiratory failure, Allergy, Back pain, Chronic diastolic heart failure, Chronic diastolic heart failure, Colon polyp, Disorder of kidney and ureter, H/O Bell's palsy, Helicobacter pylori (H. pylori), HTN (hypertension), Hypothyroid, OA (osteoarthritis), DONAVAN (obstructive sleep apnea), Pneumonia due to other staphylococcus, Pulmonary HTN, Sepsis due to pneumonia, Septic shock, Trouble in sleeping, and Urinary incontinence.    Last VS: BP (!) 154/70   Pulse 86   Temp 99 °F (37.2 °C) (Oral)   Resp 16   Ht 5' 1" (1.549 m)   Wt 63 kg (138 lb 14.2 oz)   SpO2 98%   BMI 26.24 kg/m²     24H Vital Sign Range:  Temp:  [97.8 °F (36.6 °C)-99.1 °F (37.3 °C)]   Pulse:  []   Resp:  [13-34]   BP: (150-206)/(67-88)   SpO2:  [95 %-100 %]     Level of Consciousness (AVPU): alert    Recent Labs     11/05/22  0359 11/06/22  0439 11/07/22  0407   WBC 19.07* 19.03* 14.01*   HGB 6.9* 8.3* 8.6*   HCT 21.3* 26.5* 26.4*    154 175       Recent Labs     11/05/22  0359 11/06/22  0439 11/07/22  0407    144 141   K 4.2 3.8 4.0    108 107   CO2 24 20* 19*   CREATININE 3.0* 4.2* 4.8*    81 113*   PHOS 5.4* 7.2* 7.2*   MG 1.7 1.8 1.9        No results for input(s): PH, PCO2, PO2, HCO3, POCSATURATED, BE in the last 72 hours.     OXYGEN:  Flow (L/min): 1  Oxygen Concentration (%): 50  O2 Device (Oxygen Therapy): room air    MEWS score: 1    bedside RN, Charish  contacted. No concerns verbalized at this time. Instructed to call [phone] for further concerns or assistance.    Champ Marshall RN        "

## 2022-11-07 NOTE — PT/OT/SLP PROGRESS
Speech Language Pathology Treatment    Patient Name:  Kristin Goodman   MRN:  [id number]  Admitting Diagnosis: Microscopic polyangiitis    Recommendations:                 General Recommendations:  Dysphagia therapy  Diet recommendations:  NPO, Liquid Diet Level: NPO, Other (Comment) (ok for ice chips for pleasure after oral care)   Aspiration Precautions: Continue alternate means of nutrition, Frequent oral care, Ice chips sparingly, and Strict aspiration precautions   General Precautions: Standard, aspiration, fall, NPO  Communication strategies:  provide increased time to answer and go to room if call light pushed    Subjective     Spoke with RN prior to entering pt's room . Pt seen bedside for session with NG in place and daughter present. Alert and agreeable to ST. Pt's daughter reports depressed mood since NG reinsertion yesterday.       Pain/Comfort:  Pain Rating 1: 0/10  Pain Rating Post-Intervention 1: 0/10    Respiratory Status: Room air    Objective:     Has the patient been evaluated by SLP for swallowing?   Yes  Keep patient NPO? Yes   Current Respiratory Status:   room air     Pt seen for ongoing assessment of swallow function. Daughter reported she completed oral care just prior to ST arrival. Pt agreeable to tsp sips of thins (water) x2 and nectar thick liquids via tsp x2. Oral phase c/b increased oral holding with nectar thick liquids, requiring verbal cues to swallow. Pharyngeal phase c/b immediate, wet coughing after 2/2 trials of thins and grimacing and reports of painful swallow with 2/2 trials of nectar thick liquids. Refused all offers of puree or soft solids, with daughter reporting pt is very picky and does not like pudding or applesauce and is lactose intolerant. Encouraged daughter to bring in foods patient enjoys to try with Speech Therapy. Provided extensive education to patient and her daughter re: role of ST, POC, MBSS results, continued NPO recs with the exception of ice chips for  pleasure after oral care, and plan to f/u for continued assessment of swallow function. Pt's daughter verbalized understanding, but patient will require continued reinforcement. At end of session, pt remained bedside with call light and all needs in reach. White board updated.      Assessment:     Kristin Goodman is a 77 y.o. female with an SLP diagnosis of Dysphagia.      Goals:   Multidisciplinary Problems       SLP Goals          Problem: SLP    Goal Priority Disciplines Outcome   SLP Goal     SLP Ongoing, Progressing   Description: Speech Language Pathology Goals  Goals expected to be met by 11/14/22    1. Pt will participate in ongoing assessment of swallow function to determine safest, least restrictive means of nutrition/hydration  2. Educate Pt and family on aspiration precautions and SLP POC  3. Pt will tolerate trials of nectar-thickened liquids w/o overt S/S aspiration, MIN A  4. Pt will complete dysphagia exercises to improve timing of initiation of swallow x5 with 90% accuracy, MIN A                         Plan:     Patient to be seen:  4 x/week   Plan of Care expires:  11/29/22  Plan of Care reviewed with:  patient, daughter   SLP Follow-Up:  Yes       Discharge recommendations:  other (see comments) (tbd pending progress)   Barriers to Discharge:  Level of Skilled Assistance Needed      Time Tracking:     SLP Treatment Date:   11/07/22  Speech Start Time:  0830  Speech Stop Time:  0853     Speech Total Time (min):  23 min    Billable Minutes: Treatment Swallowing Dysfunction 13 and Self Care/Home Management Training 10    11/07/2022

## 2022-11-07 NOTE — ASSESSMENT & PLAN NOTE
"Patient with baseline creatinine of 1 as recent as August 2022 with progressive worsening beginning in early October 1.3 (10/13)->1.6 (10/17)->3.0 (10/23) despite IV fluids.  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  Patient noted to have new onset proteinuria and hematuria over the last 1 month.  Additionally, would consider ATN in the setting of suspected sepsis from multifocal pneumonia.      Concerns for glomerulonephritis given patient's current clinical picture. Initial urine microscopy demonstrating muddy brown casts with likely acanthocytes noted as well. MITUL positive, proteinase 3 ab weakly positive, MPO strongly positive. Patient s/p high-dose IV solumedrol x3 doses, now on Solumedrol 50mg qd. Underwent kidney bx 10/27. Rheumatology consulted, and patient started on Plex, Ritux/cytoxan. Given continually worsening renal function and uremic encephalopathy, patient underwent SLED without complication on 10/27. Transferred to floor on 10/29. Significantly improved mentation on 10/31. Underwent HD 11/1. Patient also with increasing hypernatremia so added FWF to tube feeds.      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 Second dose Ritux on 11/3. Next dose of cytoxan on 11/10. Will discuss with Rheum regarding maintenance dosing.      Recommendations:  - HD tomorrow 11/8  - continue steroid taper per rheum   - strict intake and output  - renally dose medications and avoid nephrotoxins when possible  - replete electrolytes as appropriate  "

## 2022-11-07 NOTE — SUBJECTIVE & OBJECTIVE
Interval History: NGT in place. Feels well.     Current Facility-Administered Medications   Medication Frequency    0.9%  NaCl infusion (for blood administration) Q24H PRN    0.9%  NaCl infusion (for blood administration) Q24H PRN    0.9%  NaCl infusion (for blood administration) Q24H PRN    acetaminophen tablet 650 mg Q4H PRN    albuterol-ipratropium 2.5 mg-0.5 mg/3 mL nebulizer solution 3 mL Q4H PRN    aluminum-magnesium hydroxide-simethicone 200-200-20 mg/5 mL suspension 30 mL QID PRN    amLODIPine tablet 10 mg Daily    atovaquone 750 mg/5 mL oral liquid 1,500 mg Daily    bisacodyL suppository 10 mg Daily PRN    carvediloL tablet 25 mg BID    cloNIDine tablet 0.1 mg Q6H PRN    dextrose 10% bolus 125 mL PRN    dextrose 10% bolus 250 mL PRN    glucagon (human recombinant) injection 1 mg PRN    glucose chewable tablet 16 g PRN    glucose chewable tablet 24 g PRN    hydrALAZINE tablet 100 mg Q8H    insulin aspart U-100 pen 1-10 Units Q6H PRN    levothyroxine tablet 25 mcg Before breakfast    lisinopriL tablet 40 mg Daily    melatonin tablet 6 mg Nightly PRN    methocarbamoL tablet 500 mg TID PRN    methylPREDNISolone sodium succinate injection 20 mg Daily    naloxone 0.4 mg/mL injection 0.02 mg PRN    ondansetron injection 4 mg Q8H PRN    pantoprazole injection 40 mg Daily    prochlorperazine injection Soln 5 mg Q6H PRN    QUEtiapine tablet 25 mg QHS    simethicone chewable tablet 80 mg QID PRN    sodium chloride 0.9% 250 mL flush bag 1 time in Clinic/HOD    sodium chloride 0.9% flush 10 mL PRN    sodium chloride 0.9% flush 10 mL PRN    sodium chloride 0.9% flush 5 mL PRN     Facility-Administered Medications Ordered in Other Encounters   Medication Frequency    celecoxib capsule 400 mg Once    fentaNYL 50 mcg/mL injection  mcg PRN    LIDOcaine (PF) 10 mg/ml (1%) injection 10 mg Once PRN    LIDOcaine (PF) 10 mg/ml (1%) injection 10 mg Once    midazolam (VERSED) 1 mg/mL injection 0.5-4 mg PRN    ropivacaine 0.2%  Rodríguez PainPRO Pump infusion 500 ML Continuous     Objective:     Vital Signs (Most Recent):  Temp: 98 °F (36.7 °C) (11/07/22 1130)  Pulse: 80 (11/07/22 1520)  Resp: 13 (11/07/22 1520)  BP: (!) 165/77 (11/07/22 1520)  SpO2: 100 % (11/07/22 1520)  O2 Device (Oxygen Therapy): room air (11/06/22 2310)   Vital Signs (24h Range):  Temp:  [98 °F (36.7 °C)-99.1 °F (37.3 °C)] 98 °F (36.7 °C)  Pulse:  [] 80  Resp:  [13-34] 13  SpO2:  [97 %-100 %] 100 %  BP: (138-212)/() 165/77     Weight: 63 kg (138 lb 14.2 oz) (11/03/22 1000)  Body mass index is 26.24 kg/m².  Body surface area is 1.65 meters squared.      Intake/Output Summary (Last 24 hours) at 11/7/2022 1639  Last data filed at 11/7/2022 0500  Gross per 24 hour   Intake 800 ml   Output 451 ml   Net 349 ml       Physical Exam   Constitutional: No distress.   HENT:   Head: Normocephalic and atraumatic.   Nose: No rhinorrhea or nasal congestion.   Mouth/Throat: Oropharynx is clear.   No tenderness to palpation of sinuses, nasal cartilage   Ears: No tenderness to palpation     Eyes: Pupils are equal, round, and reactive to light.   Cardiovascular: Normal rate and regular rhythm.   No murmur heard.  Pulmonary/Chest: Effort normal. No respiratory distress. She has no wheezes. She has. Rales: RLL.  Abdominal: Soft. Bowel sounds are normal. There is no abdominal tenderness.   Musculoskeletal:         General: No deformity. Normal range of motion.      Cervical back: Normal range of motion.   Neurological: She is alert.   Skin: Skin is warm and dry.   Psychiatric: Affect and judgment normal.     Significant Labs:  All pertinent lab results from the last 24 hours have been reviewed.    Significant Imaging:  Imaging results within the past 24 hours have been reviewed.

## 2022-11-07 NOTE — NURSING
Pt is alert no signs of distress noted. Pt bp was running in the 180s systolic pt was given hydralazine 10mg IV and now is 151/75. Pt was able to get to bedside toilet and had one large BM this shift. Safety measures within place. Bed in lowest position and call light within reach.

## 2022-11-07 NOTE — PLAN OF CARE
Patient in no apparent distress. Bp has been elevated today requiring prn antihypertensives. NG tube placed and feeding resumed. Patient tolerating well. Will continue with current plan of care. No additional concerns or needs at this time.    Problem: Adult Inpatient Plan of Care  Goal: Plan of Care Review  Outcome: Ongoing, Progressing  Goal: Patient-Specific Goal (Individualized)  Outcome: Ongoing, Progressing  Goal: Absence of Hospital-Acquired Illness or Injury  Outcome: Ongoing, Progressing  Goal: Optimal Comfort and Wellbeing  Outcome: Ongoing, Progressing  Goal: Readiness for Transition of Care  Outcome: Ongoing, Progressing     Problem: Infection  Goal: Absence of Infection Signs and Symptoms  Outcome: Ongoing, Progressing     Problem: Adjustment to Illness (Sepsis/Septic Shock)  Goal: Optimal Coping  Outcome: Ongoing, Progressing

## 2022-11-07 NOTE — PLAN OF CARE
Goals updated. Pt continues with overt s/sx aspiration with thin liquids as well as c/o painful swallow. Continue NPO with NG. ST to f/u. Kacie Spears CCC-SLP 11/7/2022 2:50 PM

## 2022-11-07 NOTE — CARE UPDATE
RAPID RESPONSE NURSE FOLLOW-UP NOTE       Followed up with patient for an AI alert.  No acute issues at this time. Reviewed plan of care with bedside RNTerence .   Team will continue to follow.  Please call Rapid Response RN, Pj Painter RN with any questions or concerns at [phone].

## 2022-11-07 NOTE — PT/OT/SLP PROGRESS
Occupational Therapy   Treatment    Name: Kristin Goodman  MRN: [id number]  Admitting Diagnosis:  Microscopic polyangiitis       Recommendations:     Discharge Recommendations: nursing facility, skilled  Discharge Equipment Recommendations:  bedside commode  Barriers to discharge:       Assessment:     Kristin Goodman is a 77 y.o. female with a medical diagnosis of Microscopic polyangiitis. Pt was minimally interactive this session not speaking or answering questions much and with decreased command following. Performance deficits affecting function are weakness, impaired endurance, impaired self care skills, impaired functional mobility, gait instability, impaired balance, impaired cognition, decreased coordination, decreased safety awareness.     Rehab Prognosis:  Fair; patient would benefit from acute skilled OT services to address these deficits and reach maximum level of function.       Plan:     Patient to be seen 3 x/week to address the above listed problems via self-care/home management, therapeutic activities, therapeutic exercises, neuromuscular re-education  Plan of Care Expires: 11/20/22  Plan of Care Reviewed with: patient    Subjective     Pain/Comfort:  Pain Rating 1: 0/10    Objective:     Communicated with: rn prior to session.  Patient found supine with NG tube upon OT entry to room.    General Precautions: Standard, fall   Orthopedic Precautions:N/A   Braces:    Respiratory Status: Room air     Occupational Performance:     Bed Mobility:    Patient completed Rolling/Turning to Right with maximal assistance  Patient completed Scooting/Bridging with total assistance  Patient completed Supine to Sit with maximal assistance  Patient completed Sit to Supine with moderate assistance     Functional Mobility/Transfers:  Attempted but pt not initiating making her a Total A.    Activities of Daily Living:  Grooming: stand by assistance to wipe face sitting EOB.    [facility] 6 Click ADL: 12    Treatment &  Education:  Discussed OT POC.    Patient left supine with all lines intact and call button in reach    GOALS:   Multidisciplinary Problems       Occupational Therapy Goals          Problem: Occupational Therapy    Goal Priority Disciplines Outcome Interventions   Occupational Therapy Goal     OT, PT/OT Ongoing, Progressing    Description: Goals to be met by: 11/15     Patient will increase functional independence with ADLs by performing:    UE Dressing with Modified [place].  LE Dressing with Modified [place].  Grooming while standing with Modified [place].  Toileting from toilet with Modified [place] for hygiene and clothing management.   Supine to sit with Modified [place].  Step transfer with Modified [place]  Toilet transfer to toilet with Modified [place].                         Time Tracking:     OT Date of Treatment: 11/07/22  OT Start Time: 1129  OT Stop Time: 1143  OT Total Time (min): 14 min    Billable Minutes:Therapeutic Activity 14    OT/SARAHI: OT     SARAHI Visit Number: 0    11/7/2022

## 2022-11-07 NOTE — PT/OT/SLP PROGRESS
Physical Therapy Treatment    Patient Name:  Kristin Goodman   MRN:  [id number]    Recommendations:     Discharge Recommendations:  nursing facility, skilled   Discharge Equipment Recommendations: bedside commode   Barriers to discharge:  impaired functional mobility requiring increased assistance    Assessment:     Kristin Goodman is a 77 y.o. female admitted with a medical diagnosis of Microscopic polyangiitis.  She presents with the following impairments/functional limitations:  weakness, impaired endurance, impaired self care skills, impaired functional mobility, gait instability, impaired balance, impaired cognition, decreased coordination, decreased safety awareness, pain.     Rehab Prognosis: Good; patient would benefit from acute skilled PT services to address these deficits and reach maximum level of function.    Recent Surgery: * No surgery found *      Plan:     During this hospitalization, patient to be seen 3 x/week to address the identified rehab impairments via gait training, therapeutic activities, therapeutic exercises, neuromuscular re-education and progress toward the following goals:    Plan of Care Expires:  11/30/22    Subjective     Chief Complaint: pain  Pain/Comfort:  Pain Rating 1: other (see comments) (unrated c/o abdominal pain)  Location 1: abdomen  Pain Addressed 1: Reposition, Distraction  Pain Rating Post-Intervention 1: other (see comments) (unrated)      Objective:     Communicated with RN prior to session.  Patient found HOB elevated with NG tube upon PTA entry to room.     General Precautions: Standard, aspiration, fall, NPO   Orthopedic Precautions:N/A   Braces: N/A  Respiratory Status: Room air     Functional Mobility:  Bed Mobility:     Rolling Right: contact guard assistance  Supine to Sit: contact guard assistance  Sit to Supine: contact guard assistance  Transfers:     Sit to Stand:  minimum assistance with rolling walker  Bed to Chair: minimum assistance with  rolling walker   using  Step Transfer  Gait: Pt ambulates ~4 ft with transfer to bedside chair with RW and Min A.       AM-PAC 6 CLICK MOBILITY  Turning over in bed (including adjusting bedclothes, sheets and blankets)?: 3  Sitting down on and standing up from a chair with arms (e.g., wheelchair, bedside commode, etc.): 3  Moving from lying on back to sitting on the side of the bed?: 2  Moving to and from a bed to a chair (including a wheelchair)?: 2  Need to walk in hospital room?: 2  Climbing 3-5 steps with a railing?: 1  Basic Mobility Total Score: 13       Treatment & Education:  Pt educated and encouraged to participate. Pt amenable to assistance to mobilize to EOB and then to transfer to bedside chair.     Patient left up in chair with all lines intact, call button in reach, RN notified, and family present..    GOALS:   Multidisciplinary Problems       Physical Therapy Goals          Problem: Physical Therapy    Goal Priority Disciplines Outcome Goal Variances Interventions   Physical Therapy Goal     PT, PT/OT Ongoing, Progressing     Description: Goals to be met by: 2022     Patient will increase functional independence with mobility by performin. Supine to sit with contact guard assistance  2. Sit to supine with contact guard assistance  3. Sit to stand transfer with minimum assistance  4. Gait  x 40 feet with minimum assistance using LRAD as needed  5. Lower extremity exercise program x10 reps per handout, with independence                        Time Tracking:     PT Received On: 22  PT Start Time: 1340     PT Stop Time: 1404  PT Total Time (min): 24 min     Billable Minutes: Therapeutic Activity 24    Treatment Type: Treatment  PT/PTA: PTA     PTA Visit Number: 1     2022

## 2022-11-07 NOTE — PLAN OF CARE
Problem: Adult Inpatient Plan of Care  Goal: Plan of Care Review  Outcome: Ongoing, Progressing  Goal: Patient-Specific Goal (Individualized)  Outcome: Ongoing, Progressing  Goal: Absence of Hospital-Acquired Illness or Injury  Outcome: Ongoing, Progressing  Goal: Optimal Comfort and Wellbeing  Outcome: Ongoing, Progressing  Goal: Readiness for Transition of Care  Outcome: Ongoing, Progressing     Problem: Infection  Goal: Absence of Infection Signs and Symptoms  Outcome: Ongoing, Progressing     Problem: Adjustment to Illness (Sepsis/Septic Shock)  Goal: Optimal Coping  Outcome: Ongoing, Progressing     Problem: Infection Progression (Sepsis/Septic Shock)  Goal: Absence of Infection Signs and Symptoms  Outcome: Ongoing, Progressing     Problem: Nutrition Impaired (Sepsis/Septic Shock)  Goal: Optimal Nutrition Intake  Outcome: Ongoing, Progressing

## 2022-11-07 NOTE — ASSESSMENT & PLAN NOTE
Patient is a 78 yo F with chronic diastolic heart failure, HTN, OA, who is admitted for respiratory failure. Rheumatology is consulted due to concern for vasculitis. Patient presented with cough and hemoptysis since 9/24/22. She was admitted due to dyspnea and hypoxia on 10/17. She has had Hb drop to 6 this admission requiring blood transfusions. Reviewed her chest imaging which shows progressive disease from 10/14 until now which can be concerning for DAH. This might also explain the Hb drop. No bronch planned for now due to her tenuous respiratory status. GI defers invasive workup for now because she is not stable enough. She has had increasing creatinine from baseline of 1 to 3.0 on 10/23/22. 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   3)  CHANGES SUGGESTIVE OF FOCAL ACUTE PYELONEPHRITIS.   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     Treatment to date  - s/p IV Solumedrol 1000 mg x3 doses. Now following PEXIVAS steroid taper. Currently on IV Solumedrol 20 mg daily.  - PLEX 10/26, 10/27, 10/29, 10/30, 11/1, 11/2, 11/3 (prior to Rituxan)  - Got SLED 10/27. Bedside HD 10/30  - Rituximab 375 mg/m^2 (600 mg) on 10/27 (every 7 day dose 1 of 4), 11/3 (dose 2 of 4)  - Cyclophosphamide 750 mg/m2 on 10/27 (every 14 day dose 1 of 2)    Recommendations:  1. Continue steroid taper per PEXIVAS trial as in the chart below.Continue start Solu-Medrol 20 mg IV  started on 11/6 for total of 14 days  2. Atovaquone 1500 mg and Protonix daily while on high dose steroids.  3. Next Rituximab 375 mg/m^2 due on November 10  4. Next cyclophosphamide 750 mg/m2 due on November 10  5. Monitor CBC and CMP in 10-14 days after giving chemotherapy  6. Recommend MRI of brain given her persistent dysphagia

## 2022-11-07 NOTE — CARE UPDATE
RAPID RESPONSE NURSE ROUND       Rounding completed with charge RNYasmin No additional concerns verbalized at this time. Instructed to call [phone] for further concerns or assistance.

## 2022-11-07 NOTE — PROGRESS NOTES
"[facility] ([place] [id number])  Rheumatology  Progress Note    Patient Name: Kristin Goodman  MRN: [id number]  Admission Date: 10/17/2022  Hospital Length of Stay: 21 days  Code Status: Full Code   Attending Provider: Susi Urbina MD  Primary Care Physician: Lori Hernandez MD  Principal Problem: Microscopic polyangiitis    Subjective:     HPI: Patient is a 78 yo F with chronic diastolic heart failure, HTN, OA, who is admitted for respiratory failure. Rheumatology is consulted due to concern for vasculitis. Patient initially presented to the ED on 9/24/22 complaining of a week of cough followed by an episode of hemoptysis on 9/24 prompting the ED visit. They did a CTA which showed multifocal PNA. She was sent home with Levaquin. She followed up with PCP on 10/4/22 and was feeling better. Then she presented to the ED again on 10/14 and on 10/17 complaining of dyspnea. She had fevers in the few days leading up to admission of 102. She endorsed weakness, productive cough, dyspnea, and loss of appetite. Pt initally at 88% O2 saturation and improved to 98% on 2L NC. She was admitted for IV antibiotics. CT chest with contrast on 10/17 showed evidence of interval progression of bilateral perihilar dense consolidation with peripheral patchy areas of ground-glass opacification nonspecific as to the etiology.  Bilateral pneumonia as well as inflammatory etiologies considered.  Cardiogenic pulmonary edema considered less likely given the pattern.    On 10/19/22 she started having melena. Hb dropped to 6. She got 2 units pRBCs. GI was consulted. They noted "Colonoscopy in 2020 showed internal hemorrhoids and mild sigmoid diverticulosis. EGD in 2012 showed gastritis, biopsies positive for H. Pylori." "We considered doing EGD now, but from a pulmonary standpoint I do not think she is stable enough with the current pneumonia, therefore would recommend that we just follow the patient, treat with a PPI in case there " "is any peptic ulcer disease."    On 10/21/22 ENT was consulted due to left sided otalgia the day prior which resolved. She also was complaining of cervical adenopathy and sore throat. They did not recommend surgical intervention and felt that adenopathy was likely reactive.     On 10/23/22 ID was consulted. They recommended checking respiratory infection panel and fungal markers.    On 10/23/22 Nephrology was consulted due to worsening creatinine. They noted, "Patient is a noted to have baseline creatinine of 1 as recent as 8/2022 but progressive worsening of creatinine in early October.  On admission patient with creatinine of 1.6 (10/17) with subsequent progression and creatinine of 3.0 at time of consultation (10/23).  Nephrology consulted for acute kidney injury." "Patient with ATN nephritic picture in urine sediment, prot/crea ratio pending. Had hematuria in recent past but in context with positive urine cultures. Now definitely more dysmorphic red cells that wbcs in the urine. 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    On 10/23/22, she was transferred to ICU due to desaturations and respiratory distress. Pulmonologist noted that her respiratory status is too tenuous for bronchoscopy. She was stabilized with non-invasive ventilation and nasal cannula oxygen.      Interval History: NGT in place. Feels well.     Current Facility-Administered Medications   Medication Frequency    0.9%  NaCl infusion (for blood administration) Q24H PRN    0.9%  NaCl infusion (for blood administration) Q24H PRN    0.9%  NaCl infusion (for blood administration) Q24H PRN    acetaminophen tablet 650 mg Q4H PRN    albuterol-ipratropium 2.5 mg-0.5 mg/3 mL nebulizer solution 3 mL Q4H PRN    aluminum-magnesium hydroxide-simethicone 200-200-20 mg/5 mL suspension 30 mL QID PRN    amLODIPine tablet 10 mg Daily    " atovaquone 750 mg/5 mL oral liquid 1,500 mg Daily    bisacodyL suppository 10 mg Daily PRN    carvediloL tablet 25 mg BID    cloNIDine tablet 0.1 mg Q6H PRN    dextrose 10% bolus 125 mL PRN    dextrose 10% bolus 250 mL PRN    glucagon (human recombinant) injection 1 mg PRN    glucose chewable tablet 16 g PRN    glucose chewable tablet 24 g PRN    hydrALAZINE tablet 100 mg Q8H    insulin aspart U-100 pen 1-10 Units Q6H PRN    levothyroxine tablet 25 mcg Before breakfast    lisinopriL tablet 40 mg Daily    melatonin tablet 6 mg Nightly PRN    methocarbamoL tablet 500 mg TID PRN    methylPREDNISolone sodium succinate injection 20 mg Daily    naloxone 0.4 mg/mL injection 0.02 mg PRN    ondansetron injection 4 mg Q8H PRN    pantoprazole injection 40 mg Daily    prochlorperazine injection Soln 5 mg Q6H PRN    QUEtiapine tablet 25 mg QHS    simethicone chewable tablet 80 mg QID PRN    sodium chloride 0.9% 250 mL flush bag 1 time in Clinic/HOD    sodium chloride 0.9% flush 10 mL PRN    sodium chloride 0.9% flush 10 mL PRN    sodium chloride 0.9% flush 5 mL PRN     Facility-Administered Medications Ordered in Other Encounters   Medication Frequency    celecoxib capsule 400 mg Once    fentaNYL 50 mcg/mL injection  mcg PRN    LIDOcaine (PF) 10 mg/ml (1%) injection 10 mg Once PRN    LIDOcaine (PF) 10 mg/ml (1%) injection 10 mg Once    midazolam (VERSED) 1 mg/mL injection 0.5-4 mg PRN    ropivacaine 0.2% [facility] PainPRO Pump infusion 500 ML Continuous     Objective:     Vital Signs (Most Recent):  Temp: 98 °F (36.7 °C) (11/07/22 1130)  Pulse: 80 (11/07/22 1520)  Resp: 13 (11/07/22 1520)  BP: (!) 165/77 (11/07/22 1520)  SpO2: 100 % (11/07/22 1520)  O2 Device (Oxygen Therapy): room air (11/06/22 2310)   Vital Signs (24h Range):  Temp:  [98 °F (36.7 °C)-99.1 °F (37.3 °C)] 98 °F (36.7 °C)  Pulse:  [] 80  Resp:  [13-34] 13  SpO2:  [97 %-100 %] 100 %  BP: (138-212)/() 165/77     Weight: 63 kg (138 lb 14.2 oz)  (11/03/22 1000)  Body mass index is 26.24 kg/m².  Body surface area is 1.65 meters squared.      Intake/Output Summary (Last 24 hours) at 11/7/2022 1639  Last data filed at 11/7/2022 0500  Gross per 24 hour   Intake 800 ml   Output 451 ml   Net 349 ml       Physical Exam   Constitutional: No distress.   HENT:   Head: Normocephalic and atraumatic.   Nose: No rhinorrhea or nasal congestion.   Mouth/Throat: Oropharynx is clear.   No tenderness to palpation of sinuses, nasal cartilage   Ears: No tenderness to palpation     Eyes: Pupils are equal, round, and reactive to light.   Cardiovascular: Normal rate and regular rhythm.   No murmur heard.  Pulmonary/Chest: Effort normal. No respiratory distress. She has no wheezes. She has. Rales: RLL.  Abdominal: Soft. Bowel sounds are normal. There is no abdominal tenderness.   Musculoskeletal:         General: No deformity. Normal range of motion.      Cervical back: Normal range of motion.   Neurological: She is alert.   Skin: Skin is warm and dry.   Psychiatric: Affect and judgment normal.     Significant Labs:  All pertinent lab results from the last 24 hours have been reviewed.    Significant Imaging:  Imaging results within the past 24 hours have been reviewed.    Assessment/Plan:     Acute hypoxemic respiratory failure  Patient is a 78 yo F with chronic diastolic heart failure, HTN, OA, who is admitted for respiratory failure. Rheumatology is consulted due to concern for vasculitis. Patient presented with cough and hemoptysis since 9/24/22. She was admitted due to dyspnea and hypoxia on 10/17. She has had Hb drop to 6 this admission requiring blood transfusions. Reviewed her chest imaging which shows progressive disease from 10/14 until now which can be concerning for DAH. This might also explain the Hb drop. No bronch planned for now due to her tenuous respiratory status. GI defers invasive workup for now because she is not stable enough. She has had increasing creatinine  from baseline of 1 to 3.0 on 10/23/22. 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   3)  CHANGES SUGGESTIVE OF FOCAL ACUTE PYELONEPHRITIS.   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     Treatment to date  - s/p IV Solumedrol 1000 mg x3 doses. Now following PEXIVAS steroid taper. Currently on IV Solumedrol 20 mg daily.  - PLEX 10/26, 10/27, 10/29, 10/30, 11/1, 11/2, 11/3 (prior to Rituxan)  - Got SLED 10/27. Bedside HD 10/30  - Rituximab 375 mg/m^2 (600 mg) on 10/27 (every 7 day dose 1 of 4), 11/3 (dose 2 of 4)  - Cyclophosphamide 750 mg/m2 on 10/27 (every 14 day dose 1 of 2)    Recommendations:  Continue steroid taper per PEXIVAS trial as in the chart below.Continue start Solu-Medrol 20 mg IV started on 11/6 for total of 14 days  Atovaquone 1500 mg and Protonix daily while on high dose steroids.  Next Rituximab 375 mg/m^2 due on November 10  Next cyclophosphamide 750 mg/m2 due on November 10  Monitor CBC and CMP in 10-14 days after giving chemotherapy  Recommend MRI of brain given her persistent dysphagia                Danuta Neumann DO  Rheumatology  [facility] ([place])    I have seen the patient, reviewed the blood work, imaging and other studies.I have personally interviewed and examined the patient at bedside.    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   3)  CHANGES SUGGESTIVE OF FOCAL ACUTE PYELONEPHRITIS.   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             Treatments:  - s/p IV Solumedrol 1000 mg x3 doses. Now following PEXIVAS steroid taper. Currently on IV Solumedrol 20 mg daily.  - PLEX 10/26, 10/27, 10/29, 10/30, 11/1, 11/2, 11/3 (prior to Rituxan)  - Got SLED 10/27. Bedside HD 10/30  - Rituximab 375 mg/m^2 (600 mg) on 10/27 (every 7 day dose 1 of 4), 11/3 (dose 2 of 4)  - Cyclophosphamide 750 mg/m2 on 10/27 (every 14 day dose 1 of 2)     Recommendations:  -continue Solu-Medrol 20 mg IV started on 11/6 for total of 14 days  -Next Rituximab 375 mg/m^2 due on November 10 ( dose #3 to complete 4 weekly doses)  -Next cyclophosphamide 750 mg/m2 due on November 10 (2 of 2)

## 2022-11-07 NOTE — SUBJECTIVE & OBJECTIVE
Interval History: no acute events overnight    Review of patient's allergies indicates:   Allergen Reactions    Ampicillin     Peaches [peach (prunus persica)] Other (See Comments)     Pt unable to state type of reaction. Information obtained from daughter who states she was informed of allergy from patient.    Penicillins      Other reaction(s): Hives    Sulfa (sulfonamide antibiotics) Rash and Hives     Current Facility-Administered Medications   Medication Frequency    0.9%  NaCl infusion (for blood administration) Q24H PRN    0.9%  NaCl infusion (for blood administration) Q24H PRN    0.9%  NaCl infusion (for blood administration) Q24H PRN    acetaminophen tablet 650 mg Q4H PRN    albuterol-ipratropium 2.5 mg-0.5 mg/3 mL nebulizer solution 3 mL Q4H PRN    aluminum-magnesium hydroxide-simethicone 200-200-20 mg/5 mL suspension 30 mL QID PRN    amLODIPine tablet 10 mg Daily    atovaquone 750 mg/5 mL oral liquid 1,500 mg Daily    bisacodyL suppository 10 mg Daily PRN    carvediloL tablet 25 mg BID    cloNIDine tablet 0.1 mg Q6H PRN    dextrose 10% bolus 125 mL PRN    dextrose 10% bolus 250 mL PRN    glucagon (human recombinant) injection 1 mg PRN    glucose chewable tablet 16 g PRN    glucose chewable tablet 24 g PRN    hydrALAZINE tablet 100 mg Q8H    insulin aspart U-100 pen 1-10 Units Q6H PRN    levothyroxine tablet 25 mcg Before breakfast    lisinopriL tablet 40 mg Daily    melatonin tablet 6 mg Nightly PRN    methocarbamoL tablet 500 mg TID PRN    methylPREDNISolone sodium succinate injection 20 mg Daily    naloxone 0.4 mg/mL injection 0.02 mg PRN    ondansetron injection 4 mg Q8H PRN    pantoprazole injection 40 mg Daily    prochlorperazine injection Soln 5 mg Q6H PRN    QUEtiapine tablet 25 mg QHS    simethicone chewable tablet 80 mg QID PRN    sodium chloride 0.9% 250 mL flush bag 1 time in Clinic[facility]    sodium chloride 0.9% flush 10 mL PRN    sodium chloride 0.9% flush 10 mL PRN    sodium chloride 0.9% flush  5 mL PRN     Facility-Administered Medications Ordered in Other Encounters   Medication Frequency    celecoxib capsule 400 mg Once    fentaNYL 50 mcg/mL injection  mcg PRN    LIDOcaine (PF) 10 mg/ml (1%) injection 10 mg Once PRN    LIDOcaine (PF) 10 mg/ml (1%) injection 10 mg Once    midazolam (VERSED) 1 mg/mL injection 0.5-4 mg PRN    ropivacaine 0.2% [facility] PainPRO Pump infusion 500 ML Continuous       Objective:     Vital Signs (Most Recent):  Temp: 98 °F (36.7 °C) (11/07/22 1130)  Pulse: 79 (11/07/22 1149)  Resp: 17 (11/07/22 1130)  BP: (!) 212/100 (11/07/22 1130)  SpO2: 99 % (11/07/22 1149)  O2 Device (Oxygen Therapy): room air (11/06/22 2310)   Vital Signs (24h Range):  Temp:  [97.8 °F (36.6 °C)-99.1 °F (37.3 °C)] 98 °F (36.7 °C)  Pulse:  [] 79  Resp:  [13-34] 17  SpO2:  [97 %-100 %] 99 %  BP: (150-212)/() 212/100     Weight: 63 kg (138 lb 14.2 oz) (11/03/22 1000)  Body mass index is 26.24 kg/m².  Body surface area is 1.65 meters squared.    I/O last 3 completed shifts:  In: 1233.3 [Blood:433.3; NG/GT:800]  Out: 851 [Urine:850; Stool:1]    Physical Exam  Vitals and nursing note reviewed.   Constitutional:       General: She is not in acute distress.     Appearance: She is well-developed. She is ill-appearing. She is not toxic-appearing.      Comments: Patient awake and alert and in no distress   HENT:      Head: Normocephalic and atraumatic.      Mouth/Throat:      Mouth: Mucous membranes are dry.   Eyes:      Conjunctiva/sclera: Conjunctivae normal.   Cardiovascular:      Rate and Rhythm: Normal rate and regular rhythm.      Heart sounds: Normal heart sounds.   Pulmonary:      Effort: Pulmonary effort is normal. No respiratory distress.      Breath sounds: No wheezing or rales.      Comments: Coarse breath sounds bilaterally  Abdominal:      General: Bowel sounds are normal. There is no distension.      Palpations: Abdomen is soft.      Tenderness: There is no abdominal tenderness.    Musculoskeletal:         General: Swelling present. No tenderness. Normal range of motion.      Cervical back: Normal range of motion and neck supple.      Right lower leg: No edema.      Left lower leg: No edema.   Lymphadenopathy:      Cervical: No cervical adenopathy.   Skin:     General: Skin is warm and dry.      Capillary Refill: Capillary refill takes less than 2 seconds.      Findings: No rash.   Neurological:      General: No focal deficit present.      Mental Status: She is alert and oriented to person, place, and time.       Significant Labs:  All labs within the past 24 hours have been reviewed.     Significant Imaging:  Labs: Reviewed
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
## 2022-11-07 NOTE — PLAN OF CARE
CM  spoke with patient daughter regarding skilled placement. Referrals sent to local skilled and CM to follow up for facility acceptance and DC readiness. Patient likely ready for DC towards end of week

## 2022-11-08 LAB
ALBUMIN SERPL BCP-MCNC: 2.7 G/DL (ref 3.5–5.2)
ALP SERPL-CCNC: 64 U/L (ref 55–135)
ALT SERPL W/O P-5'-P-CCNC: 30 U/L (ref 10–44)
ANION GAP SERPL CALC-SCNC: 12 MMOL/L (ref 8–16)
AST SERPL-CCNC: 18 U/L (ref 10–40)
BASOPHILS # BLD AUTO: 0.01 K/UL (ref 0–0.2)
BASOPHILS NFR BLD: 0.1 % (ref 0–1.9)
BILIRUB SERPL-MCNC: 0.8 MG/DL (ref 0.1–1)
BUN SERPL-MCNC: 112 MG/DL (ref 8–23)
CALCIUM SERPL-MCNC: 8 MG/DL (ref 8.7–10.5)
CHLORIDE SERPL-SCNC: 107 MMOL/L (ref 95–110)
CO2 SERPL-SCNC: 22 MMOL/L (ref 23–29)
CREAT SERPL-MCNC: 5.5 MG/DL (ref 0.5–1.4)
DIFFERENTIAL METHOD: ABNORMAL
EOSINOPHIL # BLD AUTO: 0.2 K/UL (ref 0–0.5)
EOSINOPHIL NFR BLD: 1.4 % (ref 0–8)
ERYTHROCYTE [DISTWIDTH] IN BLOOD BY AUTOMATED COUNT: 17 % (ref 11.5–14.5)
EST. GFR  (NO RACE VARIABLE): 7.6 ML/MIN/1.73 M^2
GLUCOSE SERPL-MCNC: 92 MG/DL (ref 70–110)
HCT VFR BLD AUTO: 25.9 % (ref 37–48.5)
HGB BLD-MCNC: 8.3 G/DL (ref 12–16)
IMM GRANULOCYTES # BLD AUTO: 0.05 K/UL (ref 0–0.04)
IMM GRANULOCYTES NFR BLD AUTO: 0.4 % (ref 0–0.5)
LYMPHOCYTES # BLD AUTO: 0.9 K/UL (ref 1–4.8)
LYMPHOCYTES NFR BLD: 7.9 % (ref 18–48)
MAGNESIUM SERPL-MCNC: 1.9 MG/DL (ref 1.6–2.6)
MCH RBC QN AUTO: 29 PG (ref 27–31)
MCHC RBC AUTO-ENTMCNC: 32 G/DL (ref 32–36)
MCV RBC AUTO: 91 FL (ref 82–98)
MONOCYTES # BLD AUTO: 0.4 K/UL (ref 0.3–1)
MONOCYTES NFR BLD: 3.9 % (ref 4–15)
NEUTROPHILS # BLD AUTO: 9.7 K/UL (ref 1.8–7.7)
NEUTROPHILS NFR BLD: 86.3 % (ref 38–73)
NRBC BLD-RTO: 0 /100 WBC
PHOSPHATE SERPL-MCNC: 7 MG/DL (ref 2.7–4.5)
PLATELET # BLD AUTO: 174 K/UL (ref 150–450)
PMV BLD AUTO: 12.4 FL (ref 9.2–12.9)
POCT GLUCOSE: 114 MG/DL (ref 70–110)
POCT GLUCOSE: 138 MG/DL (ref 70–110)
POCT GLUCOSE: 68 MG/DL (ref 70–110)
POCT GLUCOSE: 92 MG/DL (ref 70–110)
POTASSIUM SERPL-SCNC: 3.9 MMOL/L (ref 3.5–5.1)
PROT SERPL-MCNC: 5.1 G/DL (ref 6–8.4)
RBC # BLD AUTO: 2.86 M/UL (ref 4–5.4)
SODIUM SERPL-SCNC: 141 MMOL/L (ref 136–145)
WBC # BLD AUTO: 11.24 K/UL (ref 3.9–12.7)

## 2022-11-08 PROCEDURE — 36415 COLL VENOUS BLD VENIPUNCTURE: CPT | Performed by: STUDENT IN AN ORGANIZED HEALTH CARE EDUCATION/TRAINING PROGRAM

## 2022-11-08 PROCEDURE — 36415 COLL VENOUS BLD VENIPUNCTURE: CPT

## 2022-11-08 PROCEDURE — 86146 BETA-2 GLYCOPROTEIN ANTIBODY: CPT | Mod: 59 | Performed by: INTERNAL MEDICINE

## 2022-11-08 PROCEDURE — 27000221 HC OXYGEN, UP TO 24 HOURS

## 2022-11-08 PROCEDURE — 99232 SBSQ HOSP IP/OBS MODERATE 35: CPT | Mod: ,,, | Performed by: HOSPITALIST

## 2022-11-08 PROCEDURE — 25000003 PHARM REV CODE 250: Performed by: STUDENT IN AN ORGANIZED HEALTH CARE EDUCATION/TRAINING PROGRAM

## 2022-11-08 PROCEDURE — 25000003 PHARM REV CODE 250: Performed by: INTERNAL MEDICINE

## 2022-11-08 PROCEDURE — 86850 RBC ANTIBODY SCREEN: CPT | Performed by: STUDENT IN AN ORGANIZED HEALTH CARE EDUCATION/TRAINING PROGRAM

## 2022-11-08 PROCEDURE — 63600175 PHARM REV CODE 636 W HCPCS: Performed by: INTERNAL MEDICINE

## 2022-11-08 PROCEDURE — 80053 COMPREHEN METABOLIC PANEL: CPT

## 2022-11-08 PROCEDURE — 20600001 HC STEP DOWN PRIVATE ROOM

## 2022-11-08 PROCEDURE — 90935 HEMODIALYSIS ONE EVALUATION: CPT

## 2022-11-08 PROCEDURE — 83735 ASSAY OF MAGNESIUM: CPT

## 2022-11-08 PROCEDURE — 94660 CPAP INITIATION&MGMT: CPT

## 2022-11-08 PROCEDURE — 84100 ASSAY OF PHOSPHORUS: CPT

## 2022-11-08 PROCEDURE — 85730 THROMBOPLASTIN TIME PARTIAL: CPT | Performed by: INTERNAL MEDICINE

## 2022-11-08 PROCEDURE — 99232 PR SUBSEQUENT HOSPITAL CARE,LEVL II: ICD-10-PCS | Mod: ,,, | Performed by: HOSPITALIST

## 2022-11-08 PROCEDURE — C9113 INJ PANTOPRAZOLE SODIUM, VIA: HCPCS | Performed by: INTERNAL MEDICINE

## 2022-11-08 PROCEDURE — 85025 COMPLETE CBC W/AUTO DIFF WBC: CPT

## 2022-11-08 PROCEDURE — 25000003 PHARM REV CODE 250: Performed by: HOSPITALIST

## 2022-11-08 PROCEDURE — 99900035 HC TECH TIME PER 15 MIN (STAT)

## 2022-11-08 PROCEDURE — 36415 COLL VENOUS BLD VENIPUNCTURE: CPT | Performed by: INTERNAL MEDICINE

## 2022-11-08 PROCEDURE — 94761 N-INVAS EAR/PLS OXIMETRY MLT: CPT

## 2022-11-08 PROCEDURE — 86147 CARDIOLIPIN ANTIBODY EA IG: CPT | Performed by: INTERNAL MEDICINE

## 2022-11-08 RX ORDER — HEPARIN SODIUM 1000 [USP'U]/ML
1000 INJECTION, SOLUTION INTRAVENOUS; SUBCUTANEOUS
Status: DISCONTINUED | OUTPATIENT
Start: 2022-11-08 | End: 2022-12-13 | Stop reason: HOSPADM

## 2022-11-08 RX ORDER — METOCLOPRAMIDE HYDROCHLORIDE 5 MG/ML
5 INJECTION INTRAMUSCULAR; INTRAVENOUS ONCE
Status: DISCONTINUED | OUTPATIENT
Start: 2022-11-08 | End: 2022-11-08

## 2022-11-08 RX ORDER — SODIUM CHLORIDE 9 MG/ML
INJECTION, SOLUTION INTRAVENOUS ONCE
Status: COMPLETED | OUTPATIENT
Start: 2022-11-08 | End: 2022-11-08

## 2022-11-08 RX ADMIN — AMLODIPINE BESYLATE 10 MG: 10 TABLET ORAL at 07:11

## 2022-11-08 RX ADMIN — ATOVAQUONE 1500 MG: 750 SUSPENSION ORAL at 01:11

## 2022-11-08 RX ADMIN — METHYLPREDNISOLONE SODIUM SUCCINATE 20 MG: 40 INJECTION, POWDER, FOR SOLUTION INTRAMUSCULAR; INTRAVENOUS at 07:11

## 2022-11-08 RX ADMIN — LEVOTHYROXINE SODIUM 25 MCG: 25 TABLET ORAL at 05:11

## 2022-11-08 RX ADMIN — HYDRALAZINE HYDROCHLORIDE 100 MG: 50 TABLET ORAL at 02:11

## 2022-11-08 RX ADMIN — HYDRALAZINE HYDROCHLORIDE 100 MG: 50 TABLET ORAL at 05:11

## 2022-11-08 RX ADMIN — DEXTROSE 125 ML: 10 SOLUTION INTRAVENOUS at 11:11

## 2022-11-08 RX ADMIN — PANTOPRAZOLE SODIUM 40 MG: 40 INJECTION, POWDER, FOR SOLUTION INTRAVENOUS at 07:11

## 2022-11-08 RX ADMIN — LISINOPRIL 40 MG: 20 TABLET ORAL at 07:11

## 2022-11-08 RX ADMIN — CARVEDILOL 25 MG: 25 TABLET, FILM COATED ORAL at 07:11

## 2022-11-08 RX ADMIN — SODIUM CHLORIDE: 0.9 INJECTION, SOLUTION INTRAVENOUS at 09:11

## 2022-11-08 NOTE — ASSESSMENT & PLAN NOTE
SLP following  MBSS showing global weakness in swallowing as well as aspiration with thin liquids.   ENT consulted. Followup recs  NG tube clogged and removed 11/4, unable to be reinserted due to pain    Plan   - Discussed case with Rheumatology team, they are concerned that this dysphagia may have been from prior stroke, requesting imaging for evaluation - have graciously ordered head imaging on behalf of this patient -  CT demonstrated no acute findings or causes for dysphagia  - remove NGT and place dobhoff which is more comfortable and makes swallowing easier compared to NGT.  Obtain MRI at rheum request.
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
## 2022-11-08 NOTE — PLAN OF CARE
Status stable. No c/o at present. Daughter remains at bedside. NGT remains intact, plan to insert Oralia feeding tube per Dr Urbina soon. Reinforced plan of care, pt andpts daughter voiced understanding.

## 2022-11-08 NOTE — PROGRESS NOTES
[facility] ([place] [address])  Nephrology  Progress Note    Patient Name: Kristin Goodman  MRN: [id number]  Admission Date: 10/17/2022  Hospital Length of Stay: 22 days  Attending Provider: Susi Urbina MD   Primary Care Physician: Lori Hernandez MD  Principal Problem:Microscopic polyangiitis    Subjective:     HPI: Kristin Goodman is a 77 year old female with hypertension, hypothyroidism, HFpEF who presents for cough, shortness of breath, and weakness.  Patient initially presented to ER on 09/24 with hemoptysis and imaging concerning for multilobar pneumonia at which time she was discharged on a 10 day course of levofloxacin.  Patient initially had some improvement but began having worsening symptoms on 10/14.  Patient describes multiple fevers and progressive shortness of breath.  She continues to have intermittent hemoptysis.  Patient with worsening leukocytosis and limited clinical improvement despite broad-spectrum antibiotics.  She remains on cefepime.  Patient is a noted to have baseline creatinine of 1 as recent as 8/2022 but progressive worsening of creatinine in early October.  On admission patient with creatinine of 1.6 (10/17) with subsequent progression and creatinine of 3.0 at time of consultation (10/23).  Nephrology consulted for acute kidney injury.      Interval History: no acute events overnight.     Review of patient's allergies indicates:   Allergen Reactions    Ampicillin     Peaches [peach (prunus persica)] Other (See Comments)     Pt unable to state type of reaction. Information obtained from daughter who states she was informed of allergy from patient.    Penicillins      Other reaction(s): Hives    Sulfa (sulfonamide antibiotics) Rash and Hives     Current Facility-Administered Medications   Medication Frequency    0.9%  NaCl infusion (for blood administration) Q24H PRN    0.9%  NaCl infusion (for blood administration) Q24H PRN    0.9%  NaCl infusion (for blood  administration) Q24H PRN    acetaminophen tablet 650 mg Q4H PRN    albuterol-ipratropium 2.5 mg-0.5 mg/3 mL nebulizer solution 3 mL Q4H PRN    aluminum-magnesium hydroxide-simethicone 200-200-20 mg/5 mL suspension 30 mL QID PRN    amLODIPine tablet 10 mg Daily    atovaquone 750 mg/5 mL oral liquid 1,500 mg Daily    bisacodyL suppository 10 mg Daily PRN    carvediloL tablet 25 mg BID    cloNIDine tablet 0.1 mg Q6H PRN    dextrose 10% bolus 125 mL PRN    dextrose 10% bolus 250 mL PRN    glucagon (human recombinant) injection 1 mg PRN    glucose chewable tablet 16 g PRN    glucose chewable tablet 24 g PRN    heparin (porcine) injection 1,000 Units PRN    hydrALAZINE tablet 100 mg Q8H    insulin aspart U-100 pen 1-10 Units Q6H PRN    levothyroxine tablet 25 mcg Before breakfast    lisinopriL tablet 40 mg Daily    melatonin tablet 6 mg Nightly PRN    methocarbamoL tablet 500 mg TID PRN    methylPREDNISolone sodium succinate injection 20 mg Daily    naloxone 0.4 mg/mL injection 0.02 mg PRN    ondansetron injection 4 mg Q8H PRN    pantoprazole injection 40 mg Daily    prochlorperazine injection Soln 5 mg Q6H PRN    QUEtiapine tablet 25 mg QHS    simethicone chewable tablet 80 mg QID PRN    sodium chloride 0.9% 250 mL flush bag 1 time in Clinic/HOD    sodium chloride 0.9% flush 10 mL PRN    sodium chloride 0.9% flush 10 mL PRN    sodium chloride 0.9% flush 5 mL PRN     Facility-Administered Medications Ordered in Other Encounters   Medication Frequency    celecoxib capsule 400 mg Once    fentaNYL 50 mcg/mL injection  mcg PRN    LIDOcaine (PF) 10 mg/ml (1%) injection 10 mg Once PRN    LIDOcaine (PF) 10 mg/ml (1%) injection 10 mg Once    midazolam (VERSED) 1 mg/mL injection 0.5-4 mg PRN    ropivacaine 0.2% [facility] Pain[facility] Pump infusion 500 ML Continuous       Objective:     Vital Signs (Most Recent):  Temp: 97.5 °F (36.4 °C) (11/08/22 1311)  Pulse: 65 (11/08/22 1311)  Resp: 18  (11/08/22 0845)  BP: (!) 116/58 (11/08/22 1311)  SpO2: 100 % (11/08/22 0845)  O2 Device (Oxygen Therapy): room air (11/08/22 1311)   Vital Signs (24h Range):  Temp:  [97.5 °F (36.4 °C)-98.5 °F (36.9 °C)] 97.5 °F (36.4 °C)  Pulse:  [54-83] 65  Resp:  [13-36] 18  SpO2:  [98 %-100 %] 100 %  BP: ()/(54-77) 116/58     Weight: 63 kg (138 lb 14.2 oz) (11/03/22 1000)  Body mass index is 26.24 kg/m².  Body surface area is 1.65 meters squared.    I/O last 3 completed shifts:  In: 1200 [NG/GT:1200]  Out: 451 [Urine:450; Stool:1]    Physical Exam  Vitals and nursing note reviewed.   Constitutional:       General: She is not in acute distress.     Appearance: She is well-developed. She is ill-appearing. She is not toxic-appearing.      Comments: Patient awake and alert and in no distress   HENT:      Head: Normocephalic and atraumatic.      Mouth/Throat:      Mouth: Mucous membranes are dry.   Eyes:      Conjunctiva/sclera: Conjunctivae normal.   Cardiovascular:      Rate and Rhythm: Normal rate and regular rhythm.      Heart sounds: Normal heart sounds.   Pulmonary:      Effort: Pulmonary effort is normal. No respiratory distress.      Breath sounds: No wheezing or rales.      Comments: Coarse breath sounds bilaterally  Abdominal:      General: Bowel sounds are normal. There is no distension.      Palpations: Abdomen is soft.      Tenderness: There is no abdominal tenderness.   Musculoskeletal:         General: Swelling present. No tenderness. Normal range of motion.      Cervical back: Normal range of motion and neck supple.      Right lower leg: No edema.      Left lower leg: No edema.   Lymphadenopathy:      Cervical: No cervical adenopathy.   Skin:     General: Skin is warm and dry.      Capillary Refill: Capillary refill takes less than 2 seconds.      Findings: No rash.   Neurological:      General: No focal deficit present.      Mental Status: She is alert and oriented to person, place, and time.       Significant  "Labs:  All labs within the past 24 hours have been reviewed.     Significant Imaging:  Labs: Reviewed    Assessment/Plan:     Acute renal failure superimposed on stage 3 chronic kidney disease  Patient with baseline creatinine of 1 as recent as August 2022 with progressive worsening beginning in early October 1.3 (10/13)->1.6 (10/17)->3.0 (10/23) despite IV fluids.  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  Patient noted to have new onset proteinuria and hematuria over the last 1 month.  Additionally, would consider ATN in the setting of suspected sepsis from multifocal pneumonia.      Concerns for glomerulonephritis given patient's current clinical picture. Initial urine microscopy demonstrating muddy brown casts with likely acanthocytes noted as well. MITUL positive, proteinase 3 ab weakly positive, MPO strongly positive. Patient s/p high-dose IV solumedrol x3 doses, now on Solumedrol 50mg qd. Underwent kidney bx 10/27. Rheumatology consulted, and patient started on Plex, Ritux/cytoxan. Given continually worsening renal function and uremic encephalopathy, patient underwent SLED without complication on 10/27. Transferred to floor on 10/29. Significantly improved mentation on 10/31. Underwent HD 11/1. Patient also with increasing hypernatremia so added FWF to tube feeds.      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 Second dose Ritux on 11/3. Next dose of cytoxan on 11/10. Will discuss with Rheum regarding maintenance dosing.      Recommendations:  - HD today, will likely have a on a TTS schedule unless otherwise informed of request to change.   - continue steroid taper per rheum   - strict intake and output  - renally dose medications and avoid nephrotoxins when possible  - replete " electrolytes as appropriate        Thank you for your consult. I will follow-up with patient. Please contact us if you have any additional questions.    Blue Puente MD  Nephrology  [facility] - Intensive [facility] ([place]

## 2022-11-08 NOTE — PLAN OF CARE
Problem: Adult Inpatient Plan of Care  Goal: Plan of Care Review  Outcome: Ongoing, Progressing  Goal: Patient-Specific Goal (Individualized)  Outcome: Ongoing, Progressing  Goal: Absence of Hospital-Acquired Illness or Injury  Outcome: Ongoing, Progressing  Goal: Optimal Comfort and Wellbeing  Outcome: Ongoing, Progressing  Goal: Readiness for Transition of Care  Outcome: Ongoing, Progressing     Problem: Infection  Goal: Absence of Infection Signs and Symptoms  Outcome: Ongoing, Progressing     Problem: Adjustment to Illness (Sepsis/Septic Shock)  Goal: Optimal Coping  Outcome: Ongoing, Progressing     Problem: Bleeding (Sepsis/Septic Shock)  Goal: Absence of Bleeding  Outcome: Ongoing, Progressing     Problem: Infection Progression (Sepsis/Septic Shock)  Goal: Absence of Infection Signs and Symptoms  Outcome: Ongoing, Progressing     Problem: Nutrition Impaired (Sepsis/Septic Shock)  Goal: Optimal Nutrition Intake  Outcome: Ongoing, Progressing     Problem: Fluid and Electrolyte Imbalance (Acute Kidney Injury/Impairment)  Goal: Fluid and Electrolyte Balance  Outcome: Ongoing, Progressing     Problem: Oral Intake Inadequate (Acute Kidney Injury/Impairment)  Goal: Optimal Nutrition Intake  Outcome: Ongoing, Progressing     Problem: Renal Function Impairment (Acute Kidney Injury/Impairment)  Goal: Effective Renal Function  Outcome: Ongoing, Progressing     Problem: Infection (Pneumonia)  Goal: Resolution of Infection Signs and Symptoms  Outcome: Ongoing, Progressing     Problem: Respiratory Compromise (Pneumonia)  Goal: Effective Oxygenation and Ventilation  Outcome: Ongoing, Progressing     Problem: Fall Injury Risk  Goal: Absence of Fall and Fall-Related Injury  Outcome: Ongoing, Progressing     Problem: Impaired Wound Healing  Goal: Optimal Wound Healing  Outcome: Ongoing, Progressing

## 2022-11-08 NOTE — CARE UPDATE
RAPID RESPONSE NURSE FOLLOW-UP NOTE       Followed up with patient for an AI alert.  No acute issues at this time. VSS.   Scheduled for HD today.   Reviewed plan of care with charge RN, [place] .   Team will sign off.   Please call Rapid Response RN, Do Ayala RN with any questions or concerns at [phone].

## 2022-11-08 NOTE — NURSING
Resting quietly with NGT and  bipap in place. NAD noted. Night was uneventful. Daughter ( Roro) given update,

## 2022-11-08 NOTE — PT/OT/SLP PROGRESS
Speech Language Pathology      Kristin SYD Englishn  MRN: [id number]    Patient not seen today. Off the floor for dialysis. Will follow-up on 11/9.

## 2022-11-08 NOTE — SUBJECTIVE & OBJECTIVE
Interval History: NG tube was replaced yesterday with no issues.  However, the patient is having more difficulty swallowing and participating with speech therapy with the NGT.  Patient relays pain in abdomin, throat.  Cant swallow secondary to pain.      Review of Systems   Constitutional:  Positive for activity change and fatigue. Negative for fever.   HENT:  Positive for sore throat and trouble swallowing.    Respiratory:  Negative for cough and shortness of breath.    Cardiovascular:  Negative for chest pain and leg swelling.   Gastrointestinal:  Positive for abdominal pain. Negative for nausea.   Skin:  Negative for rash.   Neurological:  Positive for weakness. Negative for headaches.   Psychiatric/Behavioral:  Negative for decreased concentration and sleep disturbance.    Objective:     Vital Signs (Most Recent):  Temp: 98 °F (36.7 °C) (11/07/22 1130)  Pulse: 80 (11/07/22 1520)  Resp: 13 (11/07/22 1520)  BP: (!) 165/77 (11/07/22 1520)  SpO2: 100 % (11/07/22 1520)   Vital Signs (24h Range):  Temp:  [98 °F (36.7 °C)-99.1 °F (37.3 °C)] 98 °F (36.7 °C)  Pulse:  [] 80  Resp:  [13-34] 13  SpO2:  [97 %-100 %] 100 %  BP: (138-212)/() 165/77     Weight: 63 kg (138 lb 14.2 oz)  Body mass index is 26.24 kg/m².    Intake/Output Summary (Last 24 hours) at 11/7/2022 1842  Last data filed at 11/7/2022 0500  Gross per 24 hour   Intake 800 ml   Output 451 ml   Net 349 ml      Physical Exam  Vitals reviewed.   Constitutional:       General: She is awake.      Appearance: She is well-developed.   HENT:      Head: Normocephalic and atraumatic.      Nose:      Comments: + NGT     Mouth/Throat:      Mouth: Mucous membranes are dry.   Eyes:      General: Lids are normal. No scleral icterus.     Conjunctiva/sclera: Conjunctivae normal.   Cardiovascular:      Rate and Rhythm: Normal rate and regular rhythm.   Pulmonary:      Effort: Pulmonary effort is normal.      Breath sounds: Normal breath sounds. No wheezing or  rhonchi.      Comments: Diminished right lower lobe breath sounds   Abdominal:      General: Bowel sounds are normal.      Palpations: Abdomen is soft.   Musculoskeletal:         General: No deformity.      Right lower leg: No edema.      Left lower leg: No edema.   Skin:     General: Skin is warm and dry.   Neurological:      General: No focal deficit present.      Mental Status: She is alert. Mental status is at baseline.   Psychiatric:         Attention and Perception: Attention normal.         Mood and Affect: Mood is depressed.       Significant Labs: All pertinent labs within the past 24 hours have been reviewed.    Significant Imaging: I have reviewed all pertinent imaging results/findings within the past 24 hours.

## 2022-11-08 NOTE — ASSESSMENT & PLAN NOTE
"Patient with baseline creatinine of 1 as recent as August 2022 with progressive worsening beginning in early October 1.3 (10/13)->1.6 (10/17)->3.0 (10/23) despite IV fluids.  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  Patient noted to have new onset proteinuria and hematuria over the last 1 month.  Additionally, would consider ATN in the setting of suspected sepsis from multifocal pneumonia.      Concerns for glomerulonephritis given patient's current clinical picture. Initial urine microscopy demonstrating muddy brown casts with likely acanthocytes noted as well. MITUL positive, proteinase 3 ab weakly positive, MPO strongly positive. Patient s/p high-dose IV solumedrol x3 doses, now on Solumedrol 50mg qd. Underwent kidney bx 10/27. Rheumatology consulted, and patient started on Plex, Ritux/cytoxan. Given continually worsening renal function and uremic encephalopathy, patient underwent SLED without complication on 10/27. Transferred to floor on 10/29. Significantly improved mentation on 10/31. Underwent HD 11/1. Patient also with increasing hypernatremia so added FWF to tube feeds.      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 Second dose Ritux on 11/3. Next dose of cytoxan on 11/10. Will discuss with Rheum regarding maintenance dosing.      Recommendations:  - HD today, will likely have a on a TTS schedule unless otherwise informed of request to change.   - continue steroid taper per rheum   - strict intake and output  - renally dose medications and avoid nephrotoxins when possible  - replete electrolytes as appropriate  "

## 2022-11-08 NOTE — PROGRESS NOTES
[facility] ([address] Medicine  Progress Note    Patient Name: Kristin Goodman  MRN: [id number]  Patient Class: IP- Inpatient   Admission Date: 10/17/2022  Length of Stay: 21 days  Attending Physician: Susi Urbina MD  Primary Care Provider: Lori Hernandez MD        Subjective:     Principal Problem:Microscopic polyangiitis        HPI:  Kristin Goodman is a 77 y.o. female with a past medical history of HTN, hypothyroidism, HFpEF, and osteoarthritis of the arm who has presented to the ED for cough, SOB, and weakness. Daughter is present at the bedside. Patient presented to the ED on 9/24 with hemoptysis, CT and CXR showed high suspicion for multilobar pneumonia. Patient was discharged with a 10-day course of levofloxacin and an albuterol inhaler. Patient followed up with her PCP on 10/4 with slight improvement in symptoms and went back to work. Patient continued to have worsening symptoms and presented to the ED again on 10/14 for evaluation and no interventions were done. Patient endorses fevers over the last few days up to 102.4 F with progressive SOB. She endorses hemoptysis with moderate amount of blood, generalized weakness, productive cough, SOB, and loss of appetite. Denies chest pain, nausea, vomiting, abdominal pain, or urinary changes.    ED: hypertensive up to 219/93 and tachycardic up to 117. Oxygen saturation on 92% on RA, placed on 5L NC with sats >97%. CBC remarkable for leukocytosis of 16.03 and Hb 7.7. K 3.3. Cr 1.6, baseline ~1.0. . Troponin 0.061. COVID and flu negative. EKG NSR. CT chest with contrast pending at time of admission. Given home amlodipine and lisinopril. Given 500mL NS, IV azithromycin, cefepime and vanc.       Overview/Hospital Course:  Ms. Goodman was admitted to Hospital Medicine for management of multifocal pneumonia and acute hypoxemic respiratory failure after presenting to the ED with fevers, cough, hemoptysis, and dyspnea. She required  escalation to the Medical ICU due to abrupt decline in her respiratory status, associated with hemoptysis and requiring NIPPV. CT chest showed bilateral patchy ground glass opacities. Labs additionally were notable for acute renal failure on CKD3. Pulmonology was consulted while under the care of [facility] and felt this picture was consistent with diffuse alveolar hemorrhage.     Her workup revealed a strongly positive MPO Ab consistent with clinical picture of microscopic polyangiitis with pulmonary and renal involvement. She underwent Trialysis catheter placement 10/25/2022 in the Medical ICU. 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     Rheumatology was consulted, extensive workup revealed MITUL + 1:2560 homogenous, +dsDNA, normal complements, PR3 1.2 (slight +),  (+), cANCA + 1:80, pANCA neg, GBMAb neg, trace cryos. Due to concern for MPA, she was started on pulse dose IV methylprednisolone 1000mg for 3 days, and plasmapheresis under the guidance of Transfusion Medicine. She remains on a steroid taper and has completed PLEX. She additionally received rituximab and cyclophosphamide. OI prophylaxis was provided with atovaquone due to a sulfa allergy.     Nephrology was consulted for evaluation of acute renal failure in the setting of MPA. She did require SLED in the ICU for renal clearance and remains on intermittent hemodialysis.     Her acute hypoxemic respiratory failure improved and she was weaned off of supplemental oxygen. She stepped down to Hospital Medicine 10/28.     She underwent MBBS for evaluation of dysphagia, which showed global delayed initiation of swallow and aspiration with thin liquids. Due to concern for possible prior stroke, head imaging  was completed. She is NPO with NG tube. ENT was consulted for evaluation of dysphagia and cervical adenopathy.     Her other chronic medical conditions including hypothyroidism, diastolic CHF, and hypertension were managed with her home medications, with dose adjustments as needed.         Interval History: NG tube was replaced yesterday with no issues.  However, the patient is having more difficulty swallowing and participating with speech therapy with the NGT.  Patient relays pain in abdomin, throat.  Cant swallow secondary to pain.      Review of Systems   Constitutional:  Positive for activity change and fatigue. Negative for fever.   HENT:  Positive for sore throat and trouble swallowing.    Respiratory:  Negative for cough and shortness of breath.    Cardiovascular:  Negative for chest pain and leg swelling.   Gastrointestinal:  Positive for abdominal pain. Negative for nausea.   Skin:  Negative for rash.   Neurological:  Positive for weakness. Negative for headaches.   Psychiatric/Behavioral:  Negative for decreased concentration and sleep disturbance.    Objective:     Vital Signs (Most Recent):  Temp: 98 °F (36.7 °C) (11/07/22 1130)  Pulse: 80 (11/07/22 1520)  Resp: 13 (11/07/22 1520)  BP: (!) 165/77 (11/07/22 1520)  SpO2: 100 % (11/07/22 1520)   Vital Signs (24h Range):  Temp:  [98 °F (36.7 °C)-99.1 °F (37.3 °C)] 98 °F (36.7 °C)  Pulse:  [] 80  Resp:  [13-34] 13  SpO2:  [97 %-100 %] 100 %  BP: (138-212)/() 165/77     Weight: 63 kg (138 lb 14.2 oz)  Body mass index is 26.24 kg/m².    Intake/Output Summary (Last 24 hours) at 11/7/2022 1842  Last data filed at 11/7/2022 0500  Gross per 24 hour   Intake 800 ml   Output 451 ml   Net 349 ml      Physical Exam  Vitals reviewed.   Constitutional:       General: She is awake.      Appearance: She is well-developed.   HENT:      Head: Normocephalic and atraumatic.      Nose:      Comments: + NGT     Mouth/Throat:      Mouth: Mucous membranes are dry.    Eyes:      General: Lids are normal. No scleral icterus.     Conjunctiva/sclera: Conjunctivae normal.   Cardiovascular:      Rate and Rhythm: Normal rate and regular rhythm.   Pulmonary:      Effort: Pulmonary effort is normal.      Breath sounds: Normal breath sounds. No wheezing or rhonchi.      Comments: Diminished right lower lobe breath sounds   Abdominal:      General: Bowel sounds are normal.      Palpations: Abdomen is soft.   Musculoskeletal:         General: No deformity.      Right lower leg: No edema.      Left lower leg: No edema.   Skin:     General: Skin is warm and dry.   Neurological:      General: No focal deficit present.      Mental Status: She is alert. Mental status is at baseline.   Psychiatric:         Attention and Perception: Attention normal.         Mood and Affect: Mood is depressed.       Significant Labs: All pertinent labs within the past 24 hours have been reviewed.    Significant Imaging: I have reviewed all pertinent imaging results/findings within the past 24 hours.      Assessment/Plan:      * Microscopic polyangiitis  As of 10/26/22 strongly positive MPO Ab (in contrast to borderline positive PR3), consistent with clinical picture of microscopic polyangiitis with pulmonary, and renal involvement after presenting with acute hypoxemic respiratory failure, hemoptysis, and acute renal failure.  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   3)  CHANGES SUGGESTIVE OF FOCAL ACUTE PYELONEPHRITIS.   4)  MILD-TO-MODERATE ARTERIONEPHROSCLEROSIS   - Rheumatology  consulted, appreciate evaluation and recommendations     - MITUL + 1:2560 homogenous, +dsDNA, normal complements     - PR3 1.2 (slight +),  (+)     - cANCA + 1:80, pANCA neg     - GBMAb neg, trace cryos  - Transfusion Medicine consulted for apheresis  - Nephrology consulted, see separate documentation for [facility]      Plan  - Steroids:    - completed IV methylprednisolone 1000mg x 3 days   - now on steroid taper as guided by Rheumatology   - currently methylprednisolone 20mg IV daily   - plan for 20mg IV daily on 11/6 for 14 days (last dose of 20mg equivalent is 11/20/2022).   - apheresis: underwent PLEX 10/26, 10/27, 10/30, 11/1, 11/2, 11/3  - rituximab every 7 days for 4 doses: Dose #1: 10/27, #2 11/3, next dose 11/10  - cyclophosphamide every 14 days for 2 doses: Dose #1 10/27, next dose 11/10  - Opportunistic Infection ppx: atovaquone 1500mg PO daily  - GI bleed prophylaxis: pantoprazole 40mg daily     Acute hypoxemic respiratory failure  Multifocal pneumonia  Hemoptysis  Dyspnea  Diffuse Alveolar Hemorrahge  In the setting of a new diagnosis of microscopic polyangiitis, presented with fevers, dyspnea,.  - Chest imaging was consistent with diffuse alveolar hemorrhage.  CT chest wc 10/17 with bl perihilar dense consolidations w/ peripheral patcy areas of GGO, BL PNA, less c/f cardiogenic pulmonary edema.  - Patient upgraded to Medical ICU 10/23/22 with abrupt respiratory decline requiring NIPPV, improved with high dose IV steroids, plasmapheresis, emergent hemodialysis.   - Unable to perform bronchoscopy in the Medical ICU due to tenuous respiratory status  - As of 11/6, hypoxemia has significantly improved    Plan:  - continue management of hypoxemia with supplemental oxygen  - continue management of underlying triggers with steroid and immunosuppressants  - Wean supplemental O2 as tolerated  - incentive spirometry and respiratory hygiene    Acute renal failure superimposed on stage 3 chronic kidney  disease  Due to microscopic polyangiitis. Remains on hemodialysis intermittently.   - Baseline creatinine 1.0-1.2.   - New onset proteinuria/hematuria.   - Renal biopsy completed and   - Trialysis in place for intermittent Hemodialysis    Plan  - Nephrology consulted, appreciate management  - management of MPA as noted separately  - Renal function panel daily  - renally dose all medications  - intermittent Hemodialysis as indicated, per Nephrology     Acute blood loss anemia  In the setting of GI bleed with melena  - Evaluated by GI, see GI bleed documentation  - Last transfusion 10/28, Hgb slowly trending down since then  - Hgb 6.9 on 11/5      Plan  - Monitor CBC Daily   - Treat with pRBC transfusion PRN Hgb <7  - qualifies for transfusion on 11/5 with Hgb 6.9, 1u pRBC ordered      Gastrointestinal hemorrhage with melena  Starting having hemoptysis and melena with associated acute blood loss anemia earlier in hospital stay. Patient with known internal hemorrhoids, mild sigmoid diverticulosis, history of gastritis and + H pylori (2012 EGD).   - GI consulted 10/19, unable to perform EGD due to instability and respiratory failure at the time    Plan  - patient unable to take PO PPI due to NGT removal on 11/5, transition to IV PPI 40mg pantoprazole daily, return to per NG tube once replaced  - Monitor CBC closely for signs of recurrent bleed          Dysphagia  SLP following  MBSS showing global weakness in swallowing as well as aspiration with thin liquids.   ENT consulted. Followup recs  NG tube clogged and removed 11/4, unable to be reinserted due to pain    Plan   - Discussed case with Rheumatology team, they are concerned that this dysphagia may have been from prior stroke, requesting imaging for evaluation - have graciously ordered head imaging on behalf of this patient -  CT demonstrated no acute findings or causes for dysphagia  - remove NGT and place dobhoff which is more comfortable and makes swallowing  easier compared to NGT.  Obtain MRI at rheum request.     Chronic diastolic heart failure  Pulmonary Hypertension due to left heart disease  TTE (10/2022) EF 65%, G1DD  Home meds: Lisinopril, Toprol     Plan  - Active volume control with intermittent Hemodialysis per Nephrology   - Daily weights (standing if tolerated)  - Strict I/Os  - Fluid restriction 1.5 day  - Cardiac diet w/ 2 g Na restriction      Lymphadenopathy of head and neck  - Has bilateral neck gland swelling with tenderness,consulted ENT  - No surgical intervention indicated   - Adenopathy likely reactive in nature   - Recommend further workup if it does not resolve within next 2 weeks     Moderate malnutrition  Nutrition consulted. Most recent weight and BMI monitored-          Malnutrition (Moderate to Severe)  Weight Loss (Malnutrition): 7.5% in 3 months              Measurements:  Wt Readings from Last 1 Encounters:   11/03/22 63 kg (138 lb 14.2 oz)   Body mass index is 26.24 kg/m².    Recommendations: Recommendation/Intervention: 1. Continue current TF regimen of Novasource @ 35 mL/hr - meeting needs. 2. RD to monitor & follow-up.  Goals: Meet % EEN, EPN by RD f/u date    Patient has been screened and assessed by RD. RD will follow patient.      Primary hypertension  BP Readings from Last 1 Encounters:   11/05/22 (!) 159/69     - Patient is unable to tolerate PO mediations, all meds to be administered via NG tube    amLODIPine tablet 10 mg, 10 mg, Per NG tube, Daily    carvediloL tablet 12.5 mg, 12.5 mg, Per NG tube, BID    hydrALAZINE tablet 25 mg, 25 mg, Per NG tube, Q8H    lisinopriL tablet 40 mg, 40 mg, Per NG tube, Daily    Plan update:  - NG unable to be replaced temporarily on 11/5, transition to IV formulation until replaced    hydrALAZINE injection 10 mg, 10 mg, Intravenous, Q6H PRN SBP >160  - Vitals Q4hr    Hypothyroid  - Continue synthroid 25 mcg  - TSH 0.585 10/27/22      VTE Risk Mitigation (From admission, onward)          Ordered     Place sequential compression device  Until discontinued         10/25/22 1731     IP VTE HIGH RISK PATIENT  Once         10/17/22 1354     Reason for No Pharmacological VTE Prophylaxis  Once        Question:  Reasons:  Answer:  Risk of Bleeding    10/17/22 1354                Discharge Planning   ANTHONY: 11/10/2022     Code Status: Full Code   Is the patient medically ready for discharge?: No    Reason for patient still in hospital (select all that apply): Patient new problem, Patient trending condition and Treatment  Discharge Plan A: Home with family                  Susi Urbina MD  Department of Hospital Medicine   [facility] - Intensive Care ([place]-[facility])

## 2022-11-08 NOTE — SUBJECTIVE & OBJECTIVE
Interval History: no acute events overnight.     Review of patient's allergies indicates:   Allergen Reactions    Ampicillin     Peaches [peach (prunus persica)] Other (See Comments)     Pt unable to state type of reaction. Information obtained from daughter who states she was informed of allergy from patient.    Penicillins      Other reaction(s): Hives    Sulfa (sulfonamide antibiotics) Rash and Hives     Current Facility-Administered Medications   Medication Frequency    0.9%  NaCl infusion (for blood administration) Q24H PRN    0.9%  NaCl infusion (for blood administration) Q24H PRN    0.9%  NaCl infusion (for blood administration) Q24H PRN    acetaminophen tablet 650 mg Q4H PRN    albuterol-ipratropium 2.5 mg-0.5 mg/3 mL nebulizer solution 3 mL Q4H PRN    aluminum-magnesium hydroxide-simethicone 200-200-20 mg/5 mL suspension 30 mL QID PRN    amLODIPine tablet 10 mg Daily    atovaquone 750 mg/5 mL oral liquid 1,500 mg Daily    bisacodyL suppository 10 mg Daily PRN    carvediloL tablet 25 mg BID    cloNIDine tablet 0.1 mg Q6H PRN    dextrose 10% bolus 125 mL PRN    dextrose 10% bolus 250 mL PRN    glucagon (human recombinant) injection 1 mg PRN    glucose chewable tablet 16 g PRN    glucose chewable tablet 24 g PRN    heparin (porcine) injection 1,000 Units PRN    hydrALAZINE tablet 100 mg Q8H    insulin aspart U-100 pen 1-10 Units Q6H PRN    levothyroxine tablet 25 mcg Before breakfast    lisinopriL tablet 40 mg Daily    melatonin tablet 6 mg Nightly PRN    methocarbamoL tablet 500 mg TID PRN    methylPREDNISolone sodium succinate injection 20 mg Daily    naloxone 0.4 mg/mL injection 0.02 mg PRN    ondansetron injection 4 mg Q8H PRN    pantoprazole injection 40 mg Daily    prochlorperazine injection Soln 5 mg Q6H PRN    QUEtiapine tablet 25 mg QHS    simethicone chewable tablet 80 mg QID PRN    sodium chloride 0.9% 250 mL flush bag 1 time in Clinic/HOD    sodium chloride 0.9% flush 10 mL PRN    sodium chloride  0.9% flush 10 mL PRN    sodium chloride 0.9% flush 5 mL PRN     Facility-Administered Medications Ordered in Other Encounters   Medication Frequency    celecoxib capsule 400 mg Once    fentaNYL 50 mcg/mL injection  mcg PRN    LIDOcaine (PF) 10 mg/ml (1%) injection 10 mg Once PRN    LIDOcaine (PF) 10 mg/ml (1%) injection 10 mg Once    midazolam (VERSED) 1 mg/mL injection 0.5-4 mg PRN    ropivacaine 0.2% [facility] PainPRO Pump infusion 500 ML Continuous       Objective:     Vital Signs (Most Recent):  Temp: 97.5 °F (36.4 °C) (11/08/22 1311)  Pulse: 65 (11/08/22 1311)  Resp: 18 (11/08/22 0845)  BP: (!) 116/58 (11/08/22 1311)  SpO2: 100 % (11/08/22 0845)  O2 Device (Oxygen Therapy): room air (11/08/22 1311)   Vital Signs (24h Range):  Temp:  [97.5 °F (36.4 °C)-98.5 °F (36.9 °C)] 97.5 °F (36.4 °C)  Pulse:  [54-83] 65  Resp:  [13-36] 18  SpO2:  [98 %-100 %] 100 %  BP: ()/(54-77) 116/58     Weight: 63 kg (138 lb 14.2 oz) (11/03/22 1000)  Body mass index is 26.24 kg/m².  Body surface area is 1.65 meters squared.    I/O last 3 completed shifts:  In: 1200 [NG/GT:1200]  Out: 451 [Urine:450; Stool:1]    Physical Exam  Vitals and nursing note reviewed.   Constitutional:       General: She is not in acute distress.     Appearance: She is well-developed. She is ill-appearing. She is not toxic-appearing.      Comments: Patient awake and alert and in no distress   HENT:      Head: Normocephalic and atraumatic.      Mouth/Throat:      Mouth: Mucous membranes are dry.   Eyes:      Conjunctiva/sclera: Conjunctivae normal.   Cardiovascular:      Rate and Rhythm: Normal rate and regular rhythm.      Heart sounds: Normal heart sounds.   Pulmonary:      Effort: Pulmonary effort is normal. No respiratory distress.      Breath sounds: No wheezing or rales.      Comments: Coarse breath sounds bilaterally  Abdominal:      General: Bowel sounds are normal. There is no distension.      Palpations: Abdomen is soft.      Tenderness: There  is no abdominal tenderness.   Musculoskeletal:         General: Swelling present. No tenderness. Normal range of motion.      Cervical back: Normal range of motion and neck supple.      Right lower leg: No edema.      Left lower leg: No edema.   Lymphadenopathy:      Cervical: No cervical adenopathy.   Skin:     General: Skin is warm and dry.      Capillary Refill: Capillary refill takes less than 2 seconds.      Findings: No rash.   Neurological:      General: No focal deficit present.      Mental Status: She is alert and oriented to person, place, and time.       Significant Labs:  All labs within the past 24 hours have been reviewed.     Significant Imaging:  Labs: Reviewed

## 2022-11-08 NOTE — PROGRESS NOTES
Dialysis complete.  Blood returned.  LIJ CVC flushed, capped and taped.      Net  mls.  Unable to remove more fluid due to soft BP trending down.    Transported from [facility] to room 50064 via w/c by transporter.

## 2022-11-08 NOTE — SUBJECTIVE & OBJECTIVE
Interval History: No acute events overnight.  Patient underwent HD today.  BP better controlled.  NG tube continues to irritate the patients nasopharynx and oropharynx to the point she does not want to swallow or talk.  Feeding tube changed to a smaller, more flexible tube for comfort.     Review of Systems   Constitutional:  Positive for fatigue. Negative for fever.   HENT:  Positive for sore throat and trouble swallowing.    Respiratory:  Negative for cough and shortness of breath.    Cardiovascular:  Negative for chest pain and leg swelling.   Gastrointestinal:  Positive for abdominal pain. Negative for nausea.   Skin:  Negative for rash.   Neurological:  Positive for weakness. Negative for headaches.   Psychiatric/Behavioral:  Negative for decreased concentration and sleep disturbance.    Objective:     Vital Signs (Most Recent):  Temp: 97.5 °F (36.4 °C) (11/08/22 1311)  Pulse: 65 (11/08/22 1311)  Resp: 18 (11/08/22 0845)  BP: (!) 116/58 (11/08/22 1311)  SpO2: 100 % (11/08/22 0845)   Vital Signs (24h Range):  Temp:  [97.5 °F (36.4 °C)-98.5 °F (36.9 °C)] 97.5 °F (36.4 °C)  Pulse:  [54-83] 65  Resp:  [13-36] 18  SpO2:  [98 %-100 %] 100 %  BP: ()/(54-77) 116/58     Weight: 63 kg (138 lb 14.2 oz)  Body mass index is 26.24 kg/m².    Intake/Output Summary (Last 24 hours) at 11/8/2022 1440  Last data filed at 11/8/2022 1319  Gross per 24 hour   Intake 998.79 ml   Output 1025 ml   Net -26.21 ml      Physical Exam  Vitals reviewed.   Constitutional:       General: She is awake.      Appearance: She is well-developed.   HENT:      Head: Normocephalic and atraumatic.      Nose:      Comments: + NGT     Mouth/Throat:      Mouth: Mucous membranes are dry.   Eyes:      General: Lids are normal. No scleral icterus.     Conjunctiva/sclera: Conjunctivae normal.   Cardiovascular:      Rate and Rhythm: Normal rate and regular rhythm.   Pulmonary:      Effort: Pulmonary effort is normal.      Breath sounds: Normal breath  sounds. No wheezing or rhonchi.      Comments: Diminished right lower lobe breath sounds   Abdominal:      General: Bowel sounds are normal.      Palpations: Abdomen is soft.   Musculoskeletal:         General: No deformity.      Right lower leg: No edema.      Left lower leg: No edema.   Skin:     General: Skin is warm and dry.   Neurological:      General: No focal deficit present.      Mental Status: She is alert. Mental status is at baseline.   Psychiatric:         Attention and Perception: Attention normal.         Mood and Affect: Mood is depressed.       Significant Labs: All pertinent labs within the past 24 hours have been reviewed.    Significant Imaging: I have reviewed all pertinent imaging results/findings within the past 24 hours.

## 2022-11-09 LAB
ABO + RH BLD: NORMAL
ALBUMIN SERPL BCP-MCNC: 2.8 G/DL (ref 3.5–5.2)
ALP SERPL-CCNC: 66 U/L (ref 55–135)
ALT SERPL W/O P-5'-P-CCNC: 28 U/L (ref 10–44)
ANION GAP SERPL CALC-SCNC: 12 MMOL/L (ref 8–16)
AST SERPL-CCNC: 20 U/L (ref 10–40)
BASOPHILS # BLD AUTO: 0.01 K/UL (ref 0–0.2)
BASOPHILS NFR BLD: 0.1 % (ref 0–1.9)
BILIRUB SERPL-MCNC: 0.8 MG/DL (ref 0.1–1)
BLD GP AB SCN CELLS X3 SERPL QL: NORMAL
BUN SERPL-MCNC: 50 MG/DL (ref 8–23)
CALCIUM SERPL-MCNC: 8.1 MG/DL (ref 8.7–10.5)
CHLORIDE SERPL-SCNC: 106 MMOL/L (ref 95–110)
CO2 SERPL-SCNC: 21 MMOL/L (ref 23–29)
CREAT SERPL-MCNC: 3.5 MG/DL (ref 0.5–1.4)
DIFFERENTIAL METHOD: ABNORMAL
EOSINOPHIL # BLD AUTO: 0.1 K/UL (ref 0–0.5)
EOSINOPHIL NFR BLD: 1.3 % (ref 0–8)
ERYTHROCYTE [DISTWIDTH] IN BLOOD BY AUTOMATED COUNT: 16.9 % (ref 11.5–14.5)
EST. GFR  (NO RACE VARIABLE): 13.1 ML/MIN/1.73 M^2
GLUCOSE SERPL-MCNC: 66 MG/DL (ref 70–110)
HCT VFR BLD AUTO: 26.5 % (ref 37–48.5)
HGB BLD-MCNC: 8.4 G/DL (ref 12–16)
IMM GRANULOCYTES # BLD AUTO: 0.04 K/UL (ref 0–0.04)
IMM GRANULOCYTES NFR BLD AUTO: 0.4 % (ref 0–0.5)
LYMPHOCYTES # BLD AUTO: 0.9 K/UL (ref 1–4.8)
LYMPHOCYTES NFR BLD: 8.6 % (ref 18–48)
MAGNESIUM SERPL-MCNC: 1.8 MG/DL (ref 1.6–2.6)
MCH RBC QN AUTO: 28.7 PG (ref 27–31)
MCHC RBC AUTO-ENTMCNC: 31.7 G/DL (ref 32–36)
MCV RBC AUTO: 90 FL (ref 82–98)
MONOCYTES # BLD AUTO: 0.5 K/UL (ref 0.3–1)
MONOCYTES NFR BLD: 4.9 % (ref 4–15)
NEUTROPHILS # BLD AUTO: 9.2 K/UL (ref 1.8–7.7)
NEUTROPHILS NFR BLD: 84.7 % (ref 38–73)
NRBC BLD-RTO: 0 /100 WBC
PHOSPHATE SERPL-MCNC: 5.8 MG/DL (ref 2.7–4.5)
PLATELET # BLD AUTO: 138 K/UL (ref 150–450)
PMV BLD AUTO: 11.9 FL (ref 9.2–12.9)
POCT GLUCOSE: 128 MG/DL (ref 70–110)
POCT GLUCOSE: 153 MG/DL (ref 70–110)
POCT GLUCOSE: 188 MG/DL (ref 70–110)
POCT GLUCOSE: 80 MG/DL (ref 70–110)
POCT GLUCOSE: 87 MG/DL (ref 70–110)
POTASSIUM SERPL-SCNC: 4.1 MMOL/L (ref 3.5–5.1)
PROT SERPL-MCNC: 5.3 G/DL (ref 6–8.4)
RBC # BLD AUTO: 2.93 M/UL (ref 4–5.4)
SODIUM SERPL-SCNC: 139 MMOL/L (ref 136–145)
WBC # BLD AUTO: 10.84 K/UL (ref 3.9–12.7)

## 2022-11-09 PROCEDURE — 97535 SELF CARE MNGMENT TRAINING: CPT

## 2022-11-09 PROCEDURE — 20600001 HC STEP DOWN PRIVATE ROOM

## 2022-11-09 PROCEDURE — 84100 ASSAY OF PHOSPHORUS: CPT

## 2022-11-09 PROCEDURE — 94761 N-INVAS EAR/PLS OXIMETRY MLT: CPT

## 2022-11-09 PROCEDURE — 97110 THERAPEUTIC EXERCISES: CPT

## 2022-11-09 PROCEDURE — C9113 INJ PANTOPRAZOLE SODIUM, VIA: HCPCS | Performed by: INTERNAL MEDICINE

## 2022-11-09 PROCEDURE — 25000003 PHARM REV CODE 250: Performed by: HOSPITALIST

## 2022-11-09 PROCEDURE — 92526 ORAL FUNCTION THERAPY: CPT

## 2022-11-09 PROCEDURE — 99900035 HC TECH TIME PER 15 MIN (STAT)

## 2022-11-09 PROCEDURE — 83735 ASSAY OF MAGNESIUM: CPT

## 2022-11-09 PROCEDURE — 97116 GAIT TRAINING THERAPY: CPT

## 2022-11-09 PROCEDURE — 80053 COMPREHEN METABOLIC PANEL: CPT

## 2022-11-09 PROCEDURE — 99232 SBSQ HOSP IP/OBS MODERATE 35: CPT | Mod: ,,, | Performed by: HOSPITALIST

## 2022-11-09 PROCEDURE — 99232 PR SUBSEQUENT HOSPITAL CARE,LEVL II: ICD-10-PCS | Mod: ,,, | Performed by: HOSPITALIST

## 2022-11-09 PROCEDURE — 36415 COLL VENOUS BLD VENIPUNCTURE: CPT

## 2022-11-09 PROCEDURE — 25000003 PHARM REV CODE 250: Performed by: STUDENT IN AN ORGANIZED HEALTH CARE EDUCATION/TRAINING PROGRAM

## 2022-11-09 PROCEDURE — 63600175 PHARM REV CODE 636 W HCPCS: Performed by: INTERNAL MEDICINE

## 2022-11-09 PROCEDURE — 85025 COMPLETE CBC W/AUTO DIFF WBC: CPT

## 2022-11-09 PROCEDURE — 94660 CPAP INITIATION&MGMT: CPT

## 2022-11-09 RX ORDER — SODIUM CHLORIDE 9 MG/ML
INJECTION, SOLUTION INTRAVENOUS ONCE
Status: DISCONTINUED | OUTPATIENT
Start: 2022-11-10 | End: 2022-12-08

## 2022-11-09 RX ORDER — HEPARIN SODIUM 1000 [USP'U]/ML
1000 INJECTION, SOLUTION INTRAVENOUS; SUBCUTANEOUS
Status: ACTIVE | OUTPATIENT
Start: 2022-11-09 | End: 2022-11-10

## 2022-11-09 RX ADMIN — LISINOPRIL 40 MG: 20 TABLET ORAL at 10:11

## 2022-11-09 RX ADMIN — HYDRALAZINE HYDROCHLORIDE 100 MG: 50 TABLET ORAL at 05:11

## 2022-11-09 RX ADMIN — CARVEDILOL 25 MG: 25 TABLET, FILM COATED ORAL at 10:11

## 2022-11-09 RX ADMIN — ATOVAQUONE 1500 MG: 750 SUSPENSION ORAL at 10:11

## 2022-11-09 RX ADMIN — PANTOPRAZOLE SODIUM 40 MG: 40 INJECTION, POWDER, FOR SOLUTION INTRAVENOUS at 10:11

## 2022-11-09 RX ADMIN — INSULIN ASPART 2 UNITS: 100 INJECTION, SOLUTION INTRAVENOUS; SUBCUTANEOUS at 05:11

## 2022-11-09 RX ADMIN — LEVOTHYROXINE SODIUM 25 MCG: 25 TABLET ORAL at 05:11

## 2022-11-09 RX ADMIN — HYDRALAZINE HYDROCHLORIDE 100 MG: 50 TABLET ORAL at 09:11

## 2022-11-09 RX ADMIN — AMLODIPINE BESYLATE 10 MG: 10 TABLET ORAL at 10:11

## 2022-11-09 RX ADMIN — HYDRALAZINE HYDROCHLORIDE 100 MG: 50 TABLET ORAL at 03:11

## 2022-11-09 RX ADMIN — METHYLPREDNISOLONE SODIUM SUCCINATE 20 MG: 40 INJECTION, POWDER, FOR SOLUTION INTRAMUSCULAR; INTRAVENOUS at 10:11

## 2022-11-09 RX ADMIN — CARVEDILOL 25 MG: 25 TABLET, FILM COATED ORAL at 09:11

## 2022-11-09 NOTE — PLAN OF CARE
Problem: Occupational Therapy  Goal: Occupational Therapy Goal  Description: Goals to be met by: 11/15     Patient will increase functional independence with ADLs by performing:    UE Dressing with Modified [place].  LE Dressing with Modified [place].  Grooming while standing with Modified [place].  Toileting from toilet with Modified [place] for hygiene and clothing management.   Supine to sit with Modified [place].  Step transfer with Modified [place]  Toilet transfer to toilet with Modified [place].    Outcome: Ongoing, Progressing     Goals remain appropriate

## 2022-11-09 NOTE — ASSESSMENT & PLAN NOTE
SLP following  MBSS showing global weakness in swallowing as well as aspiration with thin liquids.   ENT consulted but patient did not tolerate laryngoscopy     Plan   - Discussed case with Rheumatology team, they are concerned that this dysphagia may have been from prior stroke, requesting imaging for evaluation - have graciously ordered head imaging on behalf of this patient -  CT demonstrated no acute findings or causes for dysphagia NOR did MRI   - remove NGT and place dobhoff which is more comfortable and makes swallowing easier compared to NGT.    - continue working with speech therapy

## 2022-11-09 NOTE — NURSING
Patient transported to MRI by bed by transporter x 2. Telemetry notified. Dobhoff right nare intact.

## 2022-11-09 NOTE — PROGRESS NOTES
[facility] - Intensive Care ([address] Medicine  Progress Note    Patient Name: Kristin Goodman  MRN: [id number]  Patient Class: IP- Inpatient   Admission Date: 10/17/2022  Length of Stay: 22 days  Attending Physician: Susi Urbina MD  Primary Care Provider: oLri Hernandez MD        Subjective:     Principal Problem:Microscopic polyangiitis        HPI:  Kristin Goodman is a 77 y.o. female with a past medical history of HTN, hypothyroidism, HFpEF, and osteoarthritis of the arm who has presented to the ED for cough, SOB, and weakness. Daughter is present at the bedside. Patient presented to the ED on 9/24 with hemoptysis, CT and CXR showed high suspicion for multilobar pneumonia. Patient was discharged with a 10-day course of levofloxacin and an albuterol inhaler. Patient followed up with her PCP on 10/4 with slight improvement in symptoms and went back to work. Patient continued to have worsening symptoms and presented to the ED again on 10/14 for evaluation and no interventions were done. Patient endorses fevers over the last few days up to 102.4 F with progressive SOB. She endorses hemoptysis with moderate amount of blood, generalized weakness, productive cough, SOB, and loss of appetite. Denies chest pain, nausea, vomiting, abdominal pain, or urinary changes.    ED: hypertensive up to 219/93 and tachycardic up to 117. Oxygen saturation on 92% on RA, placed on 5L NC with sats >97%. CBC remarkable for leukocytosis of 16.03 and Hb 7.7. K 3.3. Cr 1.6, baseline ~1.0. . Troponin 0.061. COVID and flu negative. EKG NSR. CT chest with contrast pending at time of admission. Given home amlodipine and lisinopril. Given 500mL NS, IV azithromycin, cefepime and vanc.       Overview/Hospital Course:  Ms. Goodman was admitted to Hospital Medicine for management of multifocal pneumonia and acute hypoxemic respiratory failure after presenting to the ED with fevers, cough, hemoptysis, and dyspnea. She required  escalation to the Medical ICU due to abrupt decline in her respiratory status, associated with hemoptysis and requiring NIPPV. CT chest showed bilateral patchy ground glass opacities. Labs additionally were notable for acute renal failure on CKD3. Pulmonology was consulted while under the care of [facility] and felt this picture was consistent with diffuse alveolar hemorrhage.     Her workup revealed a strongly positive MPO Ab consistent with clinical picture of microscopic polyangiitis with pulmonary and renal involvement. She underwent Trialysis catheter placement 10/25/2022 in the Medical ICU. 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     Rheumatology was consulted, extensive workup revealed MITUL + 1:2560 homogenous, +dsDNA, normal complements, PR3 1.2 (slight +),  (+), cANCA + 1:80, pANCA neg, GBMAb neg, trace cryos. Due to concern for MPA, she was started on pulse dose IV methylprednisolone 1000mg for 3 days, and plasmapheresis under the guidance of Transfusion Medicine. She remains on a steroid taper and has completed PLEX. She additionally received rituximab and cyclophosphamide. OI prophylaxis was provided with atovaquone due to a sulfa allergy.     Nephrology was consulted for evaluation of acute renal failure in the setting of MPA. She did require SLED in the ICU for renal clearance and remains on intermittent hemodialysis. She is expected to continue HD once discharged.     Her acute hypoxemic respiratory failure improved and she was weaned off of supplemental oxygen. She stepped down to [facility] 10/28.     She underwent MBBS for evaluation of dysphagia, which showed global delayed initiation of swallow and aspiration with thin liquids. Due to  concern for possible prior stroke, head imaging was completed and negative for acute finding. She is NPO with NG tube. ENT was consulted for evaluation of dysphagia and cervical adenopathy.  Patient did not tolerate laryngoscopy; unable to visualize vocal cords but given her lack of hoarseness, they did not suspect vocal fold paresis/paralysis. MRI recommended by rheum to fully rule out any potential neurological cause of the patient's dysphagia.     Her other chronic medical conditions including hypothyroidism, diastolic CHF, and hypertension were managed with her home medications, with dose adjustments as needed.         Interval History: No acute events overnight.  Patient underwent HD today.  BP better controlled.  NG tube continues to irritate the patients nasopharynx and oropharynx to the point she does not want to swallow or talk.  Feeding tube changed to a smaller, more flexible tube for comfort.     Review of Systems   Constitutional:  Positive for fatigue. Negative for fever.   HENT:  Positive for sore throat and trouble swallowing.    Respiratory:  Negative for cough and shortness of breath.    Cardiovascular:  Negative for chest pain and leg swelling.   Gastrointestinal:  Positive for abdominal pain. Negative for nausea.   Skin:  Negative for rash.   Neurological:  Positive for weakness. Negative for headaches.   Psychiatric/Behavioral:  Negative for decreased concentration and sleep disturbance.    Objective:     Vital Signs (Most Recent):  Temp: 97.5 °F (36.4 °C) (11/08/22 1311)  Pulse: 65 (11/08/22 1311)  Resp: 18 (11/08/22 0845)  BP: (!) 116/58 (11/08/22 1311)  SpO2: 100 % (11/08/22 0845)   Vital Signs (24h Range):  Temp:  [97.5 °F (36.4 °C)-98.5 °F (36.9 °C)] 97.5 °F (36.4 °C)  Pulse:  [54-83] 65  Resp:  [13-36] 18  SpO2:  [98 %-100 %] 100 %  BP: ()/(54-77) 116/58     Weight: 63 kg (138 lb 14.2 oz)  Body mass index is 26.24 kg/m².    Intake/Output Summary (Last 24 hours) at 11/8/2022  1440  Last data filed at 11/8/2022 1319  Gross per 24 hour   Intake 998.79 ml   Output 1025 ml   Net -26.21 ml      Physical Exam  Vitals reviewed.   Constitutional:       General: She is awake.      Appearance: She is well-developed.   HENT:      Head: Normocephalic and atraumatic.      Nose:      Comments: + NGT     Mouth/Throat:      Mouth: Mucous membranes are dry.   Eyes:      General: Lids are normal. No scleral icterus.     Conjunctiva/sclera: Conjunctivae normal.   Cardiovascular:      Rate and Rhythm: Normal rate and regular rhythm.   Pulmonary:      Effort: Pulmonary effort is normal.      Breath sounds: Normal breath sounds. No wheezing or rhonchi.      Comments: Diminished right lower lobe breath sounds   Abdominal:      General: Bowel sounds are normal.      Palpations: Abdomen is soft.   Musculoskeletal:         General: No deformity.      Right lower leg: No edema.      Left lower leg: No edema.   Skin:     General: Skin is warm and dry.   Neurological:      General: No focal deficit present.      Mental Status: She is alert. Mental status is at baseline.   Psychiatric:         Attention and Perception: Attention normal.         Mood and Affect: Mood is depressed.       Significant Labs: All pertinent labs within the past 24 hours have been reviewed.    Significant Imaging: I have reviewed all pertinent imaging results/findings within the past 24 hours.      Assessment/Plan:      * Microscopic polyangiitis  As of 10/26/22 strongly positive MPO Ab (in contrast to borderline positive PR3), consistent with clinical picture of microscopic polyangiitis with pulmonary, and renal involvement after presenting with acute hypoxemic respiratory failure, hemoptysis, and acute renal failure.  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   3)  CHANGES SUGGESTIVE OF FOCAL ACUTE PYELONEPHRITIS.   4)  MILD-TO-MODERATE ARTERIONEPHROSCLEROSIS   - Rheumatology consulted, appreciate evaluation and recommendations     - MITUL + 1:2560 homogenous, +dsDNA, normal complements     - PR3 1.2 (slight +),  (+)     - cANCA + 1:80, pANCA neg     - GBMAb neg, trace cryos  - Transfusion Medicine consulted for apheresis  - Nephrology consulted, see separate documentation for WILFREDO      Plan  - Steroids:    - completed IV methylprednisolone 1000mg x 3 days   - now on steroid taper as guided by Rheumatology   - currently methylprednisolone 20mg IV daily   - plan for 20mg IV daily on 11/6 for 14 days (last dose of 20mg equivalent is 11/20/2022).   - apheresis: underwent PLEX 10/26, 10/27, 10/30, 11/1, 11/2, 11/3  - rituximab every 7 days for 4 doses: Dose #1: 10/27, #2 11/3, next dose 11/10  - cyclophosphamide every 14 days for 2 doses: Dose #1 10/27, next dose 11/10  - Opportunistic Infection ppx: atovaquone 1500mg PO daily  - GI bleed prophylaxis: pantoprazole 40mg daily     Acute hypoxemic respiratory failure  Multifocal pneumonia  Hemoptysis  Dyspnea  Diffuse Alveolar Hemorrahge  In the setting of a new diagnosis of microscopic polyangiitis, presented with fevers, dyspnea,.  - Chest imaging was consistent with diffuse alveolar hemorrhage.  CT chest wc 10/17 with bl perihilar dense consolidations w/ peripheral patcy areas of GGO, BL PNA, less c/f cardiogenic pulmonary edema.  - Patient upgraded to Medical ICU 10/23/22 with abrupt respiratory decline requiring NIPPV, improved with high dose IV steroids, plasmapheresis, emergent hemodialysis.   - Unable to perform bronchoscopy in the Medical ICU due to tenuous respiratory  status  - As of 11/6, hypoxemia has significantly improved    Plan:  - continue management of hypoxemia with supplemental oxygen  - continue management of underlying triggers with steroid and immunosuppressants  - Wean supplemental O2 as tolerated  - incentive spirometry and respiratory hygiene    Acute renal failure superimposed on stage 3 chronic kidney disease  Due to microscopic polyangiitis. Remains on hemodialysis intermittently.   - Baseline creatinine 1.0-1.2.   - New onset proteinuria/hematuria.   - Renal biopsy completed and   - Trialysis in place for intermittent Hemodialysis    Plan  - Nephrology consulted, appreciate management  - management of MPA as noted separately  - Renal function panel daily  - renally dose all medications  - intermittent Hemodialysis as indicated, per Nephrology     Acute blood loss anemia  In the setting of GI bleed with melena  - Evaluated by GI, see GI bleed documentation  - Last transfusion 10/28, Hgb slowly trending down since then  - Hgb 6.9 on 11/5      Plan  - Monitor CBC Daily   - Treat with pRBC transfusion PRN Hgb <7  - qualifies for transfusion on 11/5 with Hgb 6.9, 1u pRBC ordered      Gastrointestinal hemorrhage with melena  Starting having hemoptysis and melena with associated acute blood loss anemia earlier in hospital stay. Patient with known internal hemorrhoids, mild sigmoid diverticulosis, history of gastritis and + H pylori (2012 EGD).   - GI consulted 10/19, unable to perform EGD due to instability and respiratory failure at the time    Plan  - patient unable to take PO PPI due to NGT removal on 11/5, transition to IV PPI 40mg pantoprazole daily, return to per NG tube once replaced  - Monitor CBC closely for signs of recurrent bleed          Dysphagia  SLP following  MBSS showing global weakness in swallowing as well as aspiration with thin liquids.   ENT consulted. Followup recs  NG tube clogged and removed 11/4, unable to be reinserted due to pain    Plan    - Discussed case with Rheumatology team, they are concerned that this dysphagia may have been from prior stroke, requesting imaging for evaluation - have graciously ordered head imaging on behalf of this patient -  CT demonstrated no acute findings or causes for dysphagia  - remove NGT and place dobhoff which is more comfortable and makes swallowing easier compared to NGT.  Obtain MRI at rheum request.     Chronic diastolic heart failure  Pulmonary Hypertension due to left heart disease  TTE (10/2022) EF 65%, G1DD  Home meds: Lisinopril, Toprol     Plan  - Active volume control with intermittent Hemodialysis per Nephrology   - Daily weights (standing if tolerated)  - Strict I/Os  - Fluid restriction 1.5 day  - Cardiac diet w/ 2 g Na restriction      Lymphadenopathy of head and neck  - Has bilateral neck gland swelling with tenderness,consulted ENT  - No surgical intervention indicated   - Adenopathy likely reactive in nature   - Recommend further workup if it does not resolve within next 2 weeks     Moderate malnutrition  Nutrition consulted. Most recent weight and BMI monitored-          Malnutrition (Moderate to Severe)  Weight Loss (Malnutrition): 7.5% in 3 months              Measurements:  Wt Readings from Last 1 Encounters:   11/03/22 63 kg (138 lb 14.2 oz)   Body mass index is 26.24 kg/m².    Recommendations: Recommendation/Intervention: 1. Continue current TF regimen of Novasource @ 35 mL/hr - meeting needs. 2. RD to monitor & follow-up.  Goals: Meet % EEN, EPN by RD f/u date    Patient has been screened and assessed by RD. RD will follow patient.      Primary hypertension  BP Readings from Last 1 Encounters:   11/05/22 (!) 159/69     - Patient is unable to tolerate PO mediations, all meds to be administered via NG tube    amLODIPine tablet 10 mg, 10 mg, Per NG tube, Daily    carvediloL tablet 12.5 mg, 12.5 mg, Per NG tube, BID    hydrALAZINE tablet 25 mg, 25 mg, Per NG tube, Q8H    lisinopriL  tablet 40 mg, 40 mg, Per NG tube, Daily    Plan update:  - NG unable to be replaced temporarily on 11/5, transition to IV formulation until replaced    hydrALAZINE injection 10 mg, 10 mg, Intravenous, Q6H PRN SBP >160  - Vitals Q4hr    Hypothyroid  - Continue synthroid 25 mcg  - TSH 0.585 10/27/22      VTE Risk Mitigation (From admission, onward)         Ordered     heparin (porcine) injection 1,000 Units  As needed (PRN)         11/08/22 0854     Place sequential compression device  Until discontinued         10/25/22 1731     IP VTE HIGH RISK PATIENT  Once         10/17/22 1354     Reason for No Pharmacological VTE Prophylaxis  Once        Question:  Reasons:  Answer:  Risk of Bleeding    10/17/22 1354                Discharge Planning   ANTHONY: 11/10/2022     Code Status: Full Code   Is the patient medically ready for discharge?: No    Reason for patient still in hospital (select all that apply): Patient trending condition, Treatment and Consult recommendations  Discharge Plan A: Home with family                  Susi Urbina MD  Department of Hospital Medicine   [facility] - Intensive Care ([place] [address])

## 2022-11-09 NOTE — PT/OT/SLP PROGRESS
"Speech Language Pathology Treatment    Patient Name:  Kristin Goodman   MRN:  [id number]  Admitting Diagnosis: Microscopic polyangiitis    Recommendations:                 General Recommendations:  Dysphagia therapy  Diet recommendations: Continue temporary, alternative means nutrition/hydration to ensure adequate nutrition/hydration, consider Pleasure Feeding of small amounts (4 -5 teaspoon sized bites/sips) of puree textures, thin liquids for comfort, Liquid Diet Level: NPO   Aspiration Precautions:  Only when vital signs stable, only when upright, 1:1 Supervision and close monitoring for S/S aspiration, 1 bite/sip at a time, Eliminate distractions, Feed only when awake/alert, Frequent oral care, No straws, Small bites/sips, and Strict aspiration precautions   Continue to monitor for signs and symptoms of aspiration and discontinue oral feeding should you notice any of the following: watery eyes, reddened facial area, wet vocal quality, increased work of breathing, change in respiratory status, increased congestion, coughing, fever and/or change in level of alertness  General Precautions: Standard, aspiration, fall  Communication strategies:  provide increased time to answer and go to room if call light pushed    Subjective     SLP reviewed Pt with RN prior to session, RN cleared for tx  Pt presents calm  She explains, "I had some Sprite earlier with my Daughter"    Pain/Comfort:  Pain Rating 1: 0/10    Respiratory Status: Room air    Objective:     Has the patient been evaluated by SLP for swallowing?   Yes  Keep patient NPO? No   Current Respiratory Status:    room air     Pt found asleep and partially upright in bed with NG in place.She woke per simple verbal cues. HOB elevated. She was alert and attentive with appropriate response time. Her voice was clear with adequate intensity. She demonstrated functional lingua/labial strength and coordination upon review of the oral mechanism. She denied pain in her throat " when swallowing secretions. She denied difficulty swallowing her secretions. She explained she had been having ice chips and some sips of thin liquids with her daughter.  She denied difficulty with swallowing. She endorsed nausea earlier service day.  She demonstrated dry swallow two of two attempts with timely initiation of swallow. Pt explained she did not care for apple sauce or pudding but would try thickened water.She willingly accepted trials of thin liquids (ice chips x 5, tsp sips x4), and puree (water thickened to pudding-thick consistency x3.) She politely declined additional trials with SLP.  No overt S/S aspiration with trials accepted. Pt and Sibling were educated on aspiration risk, overt clinical signs of aspiration, aspiration precautions, and SLP POC.  SLP further reviewed role of pleasure feeds to improve swallow function and efficiency with ongoing goal of increasing oral intake and reducing reliance on alternative means nutrition. Pt verbalized partial understanding of education provided. No questions noted. White board updated. Patient remained upright in bed with Sibling at bedside upon SLP exit from room. MD team notified of findings/recommendations following session. S      Assessment:     Kristin Goodman is a 77 y.o. female with an SLP diagnosis of Dysphagia.  She presents with improved timing of initiation of swallow and acceptance of PO trials this day.      Goals:   Multidisciplinary Problems       SLP Goals          Problem: SLP    Goal Priority Disciplines Outcome   SLP Goal     SLP Ongoing, Progressing   Description: Speech Language Pathology Goals  Goals expected to be met by 11/14/22    1. Pt will participate in ongoing assessment of swallow function to determine safest, least restrictive means of nutrition/hydration  2. Educate Pt and family on aspiration precautions and SLP POC  3. Pt will tolerate trials of nectar-thickened liquids w/o overt S/S aspiration, MIN A  4. Pt will  complete dysphagia exercises to improve timing of initiation of swallow x5 with 90% accuracy, MIN A                         Plan:     Patient to be seen:  4 x/week   Plan of Care expires:  11/29/22  Plan of Care reviewed with:  patient, sibling   SLP Follow-Up:  Yes       Discharge recommendations:  other (see comments) (tbd pending progress)       Time Tracking:     SLP Treatment Date:   11/09/22  Speech Start Time:  1515  Speech Stop Time:  1553     Speech Total Time (min):  38 min    Billable Minutes: Treatment Swallowing Dysfunction 14 and Self Care/Home Management Training 24    11/09/2022

## 2022-11-09 NOTE — PT/OT/SLP PROGRESS
Occupational Therapy   Treatment    Name: Kristin Goodman  MRN: [id number]  Admitting Diagnosis:  Microscopic polyangiitis       Recommendations:     Discharge Recommendations: nursing facility, skilled  Discharge Equipment Recommendations:  bedside commode  Barriers to discharge:  None    Assessment:     Kristin Goodman is a 77 y.o. female with a medical diagnosis of Microscopic polyangiitis.  She presents with good participation and motivation. Pt continues to require (A) with all activities & is at risk for falls. Goals remain appropriate. Performance deficits affecting function are weakness, impaired endurance, impaired self care skills, impaired functional mobility, impaired balance, decreased upper extremity function, decreased lower extremity function, decreased safety awareness.     Rehab Prognosis:  Good; patient would benefit from acute skilled OT services to address these deficits and reach maximum level of function.       Plan:     Patient to be seen 3 x/week to address the above listed problems via self-care/home management, therapeutic activities, therapeutic exercises, neuromuscular re-education  Plan of Care Expires: 11/20/22  Plan of Care Reviewed with: patient, daughter    Subjective   Pt reported that she was doing better today.  Pain/Comfort:  Pain Rating 1: 0/10  Pain Rating Post-Intervention 1: 0/10    Objective:     Communicated with: RN prior to session.  Patient found seated on toilet in bathroom with peripheral IV (seated on toilet in bathroom with daughter present) upon OT entry to room.    General Precautions: Standard, fall, aspiration, NPO   Orthopedic Precautions:N/A   Braces: N/A     Occupational Performance:     Functional Mobility/Transfers:  Patient completed Sit <> Stand Transfer with minimum assistance  with  no AD   Patient completed Toilet Transfer Step Transfer technique with minimum assistance with  no AD  Functional Mobility: Min (A) with functional mobility from toilet to chair  in room using IV pole for support    Activities of Daily Living:  Upper Body Dressing: minimum assistance donning gown around back while seated on toilet  Lower Body Dressing: minimum assistance donning socks seated in chair      [facility] 6 Click ADL: 18    Treatment & Education:  Pt performed AROM exercises with BUE in 3 planes x 10 reps each while seated in chair.  Provided education on transfers, safety & improved mobility.  Provided education on endurance techniques.  Pt had no further questions & when asked whether there were any concerns pt reported none.      Patient left up in chair with all lines intact, call button in reach, RN notified, and daughter present    GOALS:   Multidisciplinary Problems       Occupational Therapy Goals          Problem: Occupational Therapy    Goal Priority Disciplines Outcome Interventions   Occupational Therapy Goal     OT, PT/OT Ongoing, Progressing    Description: Goals to be met by: 11/15     Patient will increase functional independence with ADLs by performing:    UE Dressing with Modified Cross.  LE Dressing with Modified Cross.  Grooming while standing with Modified Cross.  Toileting from toilet with Modified Cross for hygiene and clothing management.   Supine to sit with Modified Cross.  Step transfer with Modified Cross  Toilet transfer to toilet with Modified Cross.                         Time Tracking:     OT Date of Treatment: 11/09/22  OT Start Time: 0928  OT Stop Time: 0959  OT Total Time (min): 31 min    Billable Minutes:Self Care/Home Management 16  Therapeutic Exercise 15    OT/SARAHI: OT     SARAHI Visit Number: 0    11/9/2022

## 2022-11-09 NOTE — PLAN OF CARE
Patient not medically ready for DC  at this time. TF not at goal. Will need skilled placement and CM to follow for dc readiness and facility acceptance.

## 2022-11-09 NOTE — PROGRESS NOTES
[facility] ([place] [address])  Nephrology  Progress Note    Patient Name: Kristin Goodman  MRN: [id number]  Admission Date: 10/17/2022  Hospital Length of Stay: 23 days  Attending Provider: Susi Urbina MD   Primary Care Physician: Lori Hernandez MD  Principal Problem:Microscopic polyangiitis    Subjective:     HPI: Kristin Goodman is a 77 year old female with hypertension, hypothyroidism, HFpEF who presents for cough, shortness of breath, and weakness.  Patient initially presented to ER on 09/24 with hemoptysis and imaging concerning for multilobar pneumonia at which time she was discharged on a 10 day course of levofloxacin.  Patient initially had some improvement but began having worsening symptoms on 10/14.  Patient describes multiple fevers and progressive shortness of breath.  She continues to have intermittent hemoptysis.  Patient with worsening leukocytosis and limited clinical improvement despite broad-spectrum antibiotics.  She remains on cefepime.  Patient is a noted to have baseline creatinine of 1 as recent as 8/2022 but progressive worsening of creatinine in early October.  On admission patient with creatinine of 1.6 (10/17) with subsequent progression and creatinine of 3.0 at time of consultation (10/23).  Nephrology consulted for acute kidney injury.      Interval History: no acute events overnight. Tolerated HD 1/1/8    Review of patient's allergies indicates:   Allergen Reactions    Ampicillin     Peaches [peach (prunus persica)] Other (See Comments)     Pt unable to state type of reaction. Information obtained from daughter who states she was informed of allergy from patient.    Penicillins      Other reaction(s): Hives    Sulfa (sulfonamide antibiotics) Rash and Hives     Current Facility-Administered Medications   Medication Frequency    0.9%  NaCl infusion (for blood administration) Q24H PRN    0.9%  NaCl infusion (for blood administration) Q24H PRN    0.9%  NaCl infusion  (for blood administration) Q24H PRN    acetaminophen tablet 650 mg Q4H PRN    albuterol-ipratropium 2.5 mg-0.5 mg/3 mL nebulizer solution 3 mL Q4H PRN    aluminum-magnesium hydroxide-simethicone 200-200-20 mg/5 mL suspension 30 mL QID PRN    amLODIPine tablet 10 mg Daily    atovaquone 750 mg/5 mL oral liquid 1,500 mg Daily    bisacodyL suppository 10 mg Daily PRN    carvediloL tablet 25 mg BID    cloNIDine tablet 0.1 mg Q6H PRN    dextrose 10% bolus 125 mL PRN    dextrose 10% bolus 250 mL PRN    glucagon (human recombinant) injection 1 mg PRN    glucose chewable tablet 16 g PRN    glucose chewable tablet 24 g PRN    heparin (porcine) injection 1,000 Units PRN    hydrALAZINE tablet 100 mg Q8H    insulin aspart U-100 pen 1-10 Units Q6H PRN    levothyroxine tablet 25 mcg Before breakfast    lisinopriL tablet 40 mg Daily    melatonin tablet 6 mg Nightly PRN    methocarbamoL tablet 500 mg TID PRN    methylPREDNISolone sodium succinate injection 20 mg Daily    naloxone 0.4 mg/mL injection 0.02 mg PRN    ondansetron injection 4 mg Q8H PRN    pantoprazole injection 40 mg Daily    prochlorperazine injection Soln 5 mg Q6H PRN    QUEtiapine tablet 25 mg QHS    simethicone chewable tablet 80 mg QID PRN    sodium chloride 0.9% 250 mL flush bag [facility] in Clinic[facility]    sodium chloride 0.9% flush 10 mL PRN    sodium chloride 0.9% flush 10 mL PRN    sodium chloride 0.9% flush 5 mL PRN     Facility-Administered Medications Ordered in Other Encounters   Medication Frequency    celecoxib capsule 400 mg Once    fentaNYL 50 mcg/mL injection  mcg PRN    LIDOcaine (PF) 10 mg/ml (1%) injection 10 mg Once PRN    LIDOcaine (PF) 10 mg/ml (1%) injection 10 mg Once    midazolam (VERSED) 1 mg/mL injection 0.5-4 mg PRN    ropivacaine 0.2% [facility] PainPRO Pump infusion 500 ML Continuous       Objective:     Vital Signs (Most Recent):  Temp: 98.5 °F (36.9 °C) (11/09/22 1205)  Pulse: 70 (11/09/22 1205)  Resp:  15 (11/09/22 1205)  BP: (!) 142/63 (11/09/22 1205)  SpO2: 99 % (11/09/22 1205)  O2 Device (Oxygen Therapy): room air (11/09/22 0740)   Vital Signs (24h Range):  Temp:  [97.8 °F (36.6 °C)-98.8 °F (37.1 °C)] 98.5 °F (36.9 °C)  Pulse:  [58-87] 70  Resp:  [13-30] 15  SpO2:  [98 %-100 %] 99 %  BP: (131-158)/(63-73) 142/63     Weight: 63 kg (138 lb 14.2 oz) (11/09/22 1000)  Body mass index is 26.24 kg/m².  Body surface area is 1.65 meters squared.    I/O last 3 completed shifts:  In: 1515.3 [I.V.:298.8; Other:300; NG/GT:800; IV Piggyback:116.6]  Out: 1025 [Other:1025]    Physical Exam  Vitals and nursing note reviewed.   Constitutional:       General: She is not in acute distress.     Appearance: She is well-developed. She is ill-appearing. She is not toxic-appearing.      Comments: Patient awake and alert and in no distress   HENT:      Head: Normocephalic and atraumatic.      Mouth/Throat:      Mouth: Mucous membranes are dry.   Eyes:      Conjunctiva/sclera: Conjunctivae normal.   Cardiovascular:      Rate and Rhythm: Normal rate and regular rhythm.      Heart sounds: Normal heart sounds.   Pulmonary:      Effort: Pulmonary effort is normal. No respiratory distress.      Breath sounds: No wheezing or rales.      Comments: Coarse breath sounds bilaterally  Abdominal:      General: Bowel sounds are normal. There is no distension.      Palpations: Abdomen is soft.      Tenderness: There is no abdominal tenderness.   Musculoskeletal:         General: Swelling present. No tenderness. Normal range of motion.      Cervical back: Normal range of motion and neck supple.      Right lower leg: No edema.      Left lower leg: No edema.   Lymphadenopathy:      Cervical: No cervical adenopathy.   Skin:     General: Skin is warm and dry.      Capillary Refill: Capillary refill takes less than 2 seconds.      Findings: No rash.   Neurological:      General: No focal deficit present.      Mental Status: She is alert and oriented to  "person, place, and time.       Significant Labs:  All labs within the past 24 hours have been reviewed.     Significant Imaging:  Labs: Reviewed    Assessment/Plan:     Acute renal failure superimposed on stage 3 chronic kidney disease  Patient with baseline creatinine of 1 as recent as August 2022 with progressive worsening beginning in early October 1.3 (10/13)->1.6 (10/17)->3.0 (10/23) despite IV fluids.  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  Patient noted to have new onset proteinuria and hematuria over the last 1 month.  Additionally, would consider ATN in the setting of suspected sepsis from multifocal pneumonia.      Concerns for glomerulonephritis given patient's current clinical picture. Initial urine microscopy demonstrating muddy brown casts with likely acanthocytes noted as well. MITUL positive, proteinase 3 ab weakly positive, MPO strongly positive. Patient s/p high-dose IV solumedrol x3 doses, now on Solumedrol 50mg qd. Underwent kidney bx 10/27. Rheumatology consulted, and patient started on Plex, Ritux/cytoxan. Given continually worsening renal function and uremic encephalopathy, patient underwent SLED without complication on 10/27. Transferred to floor on 10/29. Significantly improved mentation on 10/31. Underwent HD 11/1. Patient also with increasing hypernatremia so added FWF to tube feeds.      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 Second dose Ritux on 11/3. Next dose of cytoxan on 11/10. Will discuss with Rheum regarding maintenance dosing.      Recommendations:  - HD last on 11/8  - plan for next HD session 11/9   - continue steroid taper per rheum   - strict intake and output  - renally dose medications and avoid nephrotoxins when possible  - replete " electrolytes as appropriate        Thank you for your consult. I will follow-up with patient. Please contact us if you have any additional questions.    Blue Puente MD  Nephrology  [facility] - Intensive Care ([place]-)

## 2022-11-09 NOTE — NURSING
Patient returned to room via bed from MRI. Dobhoff right nare in place. Denies any needs at this time. Call bell in reach.

## 2022-11-09 NOTE — NURSING
Repositioned in bed. Informed patient that dobhoff was ok to use and that tube feeds would be resumed and AM meds given. Voiced understanding.Novasource Renal initiated @ 10 ml/hr.

## 2022-11-09 NOTE — PROGRESS NOTES
[facility] ([place] [facility]  Adult Nutrition  Consult Note    SUMMARY     Recommendations    1. As tolerated, increase TF rate (of Novasource) to 35 mL/hr - 1680 kcals, 76 g of protein, 602 mL fluid.   2. RD to monitor & follow-up.    Goals: Meet % EEN, EPN by RD f/u date  Nutrition Goal Status: progressing towards goal  Communication of RD Recs: reviewed with RN    Assessment and Plan    Moderate malnutrition    Nutrition Problem:  Moderate Protein-Calorie Malnutrition  Malnutrition in the context of Acute Illness/Injury    Related to (etiology):  Inability to consume sufficient energy    Signs and Symptoms (as evidenced by):  Body Fat Depletion: moderate depletion of orbitals and triceps   Muscle Mass Depletion: moderate depletion of temples and clavicle region   Weight Loss: 7.5% x 3 months  Fluid Accumulation: mild    Interventions(treatment strategy):  Collaboration of nutrition care w/ other providers  EN    Nutrition Diagnosis Status:  Continues     Malnutrition Assessment    Weight Loss (Malnutrition): 7.5% in 3 months   Orbital Region (Subcutaneous Fat Loss): moderate depletion  Upper Arm Region (Subcutaneous Fat Loss): moderate depletion   [place] Region (Muscle Loss): moderate depletion  Clavicle Bone Region (Muscle Loss): moderate depletion   Edema (Fluid Accumulation): 2-->mild     Reason for Assessment    Reason For Assessment: RD follow-up  Diagnosis: other (see comments) (Polyangiittis)  Relevant Medical History: HTN, HF  Interdisciplinary Rounds: did not attend    General Information Comments: Remains NPO, per SLP - TFs infusing via transpyloric tube. Pt receiving HD. UBW: 150#, per chart review. NFPE complete 10/27 - moderate fat & muscle wasting. Pt meets criteria for moderate malnutrition. Please see PES statement for details.  Nutrition Discharge Planning: Pending clinical course    Nutrition/Diet History    Spiritual, Cultural Beliefs, Hoahaoism Practices, Values that  "Affect Care: no  Food Allergies: other (see comments) (Peaches)  Factors Affecting Nutritional Intake: NPO    Anthropometrics    Temp: 97.8 °F (36.6 °C)  Height Method: Stated  Height: 5' 1" (154.9 cm)  Height (inches): 61 in  Weight Method: Bed Scale  Weight: 63 kg (138 lb 14.2 oz)  Weight (lb): 138.89 lb  Ideal Body Weight (IBW), Female: 105 lb  % Ideal Body Weight, Female (lb): 132.28 %  BMI (Calculated): 26.3  BMI Grade: 25 - 29.9 - overweight  Usual Body Weight (UBW), k.7 kg  % Usual Body Weight: 92.62  % Weight Change From Usual Weight: -7.57 %    Lab/Procedures/Meds    Pertinent Labs Reviewed: reviewed  Pertinent Labs Comments: BUN 50, Creat 3.5, GFR 13.1, P 5.8  Pertinent Medications Reviewed: reviewed    Estimated/Assessed Needs    Weight Used For Calorie Calculations: 63 kg (138 lb 14.2 oz)    Energy Calorie Requirements (kcal): 7348-1516 kcal/d  Energy Need Method: Kcal/kg (25-30 kcal/kg)    Protein Requirements: 76-88 g/d (1.2-1.4 g/kg)  Weight Used For Protein Calculations: 63 kg (138 lb 14.2 oz)    Estimated Fluid Requirement Method: other (see comments) (Per MD or 1 mL/kcal)  RDA Method (mL): 1575    Nutrition Prescription Ordered    Current Diet Order: NPO  Current Nutrition Support Formula Ordered: Novasource Renal  Current Nutrition Support Rate Ordered: 20 mL/hr    Evaluation of Received Nutrient/Fluid Intake    Enteral Calories (kcal): 960  Enteral Protein (gm): 44  Enteral (Free Water) Fluid (mL): 344  Free Water Flush Fluid (mL): 1600    % Kcal Needs: 61%  % Protein Needs: 58%    I/O: +11.5L since 10/26    Energy Calories Required: not meeting needs  Protein Required: not meeting needs  Fluid Required: other (see comments) (Per MD)    Comments: LBM:     Tolerance: tolerating    Nutrition Risk    Level of Risk/Frequency of Follow-up:  (1x/week)     Monitor and Evaluation    Food and Nutrient Intake: energy intake, food and beverage intake, enteral nutrition intake  Food and Nutrient " Adminstration: diet order, enteral and parenteral nutrition administration  Physical Activity and Function: nutrition-related ADLs and IADLs  Anthropometric Measurements: weight, weight change  Biochemical Data, Medical Tests and Procedures: inflammatory profile, lipid profile, glucose/endocrine profile, gastrointestinal profile, electrolyte and renal panel  Nutrition-Focused Physical Findings: overall appearance     Nutrition Follow-Up    RD Follow-up?: Yes

## 2022-11-09 NOTE — SUBJECTIVE & OBJECTIVE
Interval History: No acute events overnight.  She had dohoff placed yesterday for more comfort.  Tolerating tube feeds.  Working with speech with hopes of passing swallow exam. Continues to undergo HD.  Denies pain, nausea, vomiting.      Review of Systems   Constitutional:  Positive for fatigue. Negative for fever.   HENT:  Positive for trouble swallowing. Negative for sore throat.    Respiratory:  Negative for cough and shortness of breath.    Cardiovascular:  Negative for chest pain and leg swelling.   Gastrointestinal:  Negative for abdominal pain and nausea.   Neurological:  Positive for weakness. Negative for headaches.   Psychiatric/Behavioral:  Negative for decreased concentration and sleep disturbance.    Objective:     Vital Signs (Most Recent):  Temp: 98.5 °F (36.9 °C) (11/09/22 1205)  Pulse: 70 (11/09/22 1205)  Resp: 15 (11/09/22 1205)  BP: (!) 142/63 (11/09/22 1205)  SpO2: 99 % (11/09/22 1205)   Vital Signs (24h Range):  Temp:  [97.8 °F (36.6 °C)-98.8 °F (37.1 °C)] 98.5 °F (36.9 °C)  Pulse:  [58-87] 70  Resp:  [13-30] 15  SpO2:  [98 %-100 %] 99 %  BP: (131-158)/(63-73) 142/63     Weight: 63 kg (138 lb 14.2 oz)  Body mass index is 26.24 kg/m².    Intake/Output Summary (Last 24 hours) at 11/9/2022 1349  Last data filed at 11/9/2022 0616  Gross per 24 hour   Intake 516.55 ml   Output --   Net 516.55 ml      Physical Exam  Vitals reviewed.   Constitutional:       General: She is awake.      Appearance: She is well-developed.   HENT:      Head: Normocephalic and atraumatic.      Nose:      Comments: + NGT     Mouth/Throat:      Mouth: Mucous membranes are dry.      Comments: + nasogastric feeding tube  Eyes:      General: Lids are normal. No scleral icterus.     Conjunctiva/sclera: Conjunctivae normal.   Cardiovascular:      Rate and Rhythm: Normal rate and regular rhythm.   Pulmonary:      Effort: Pulmonary effort is normal.      Breath sounds: Normal breath sounds. No wheezing or rhonchi.      Comments:  Diminished right lower lobe breath sounds   Abdominal:      General: Bowel sounds are normal.      Palpations: Abdomen is soft.   Musculoskeletal:         General: No deformity.      Right lower leg: No edema.      Left lower leg: No edema.   Skin:     General: Skin is warm and dry.   Neurological:      General: No focal deficit present.      Mental Status: She is alert. Mental status is at baseline.   Psychiatric:         Attention and Perception: Attention normal.         Mood and Affect: Mood normal.       Significant Labs: All pertinent labs within the past 24 hours have been reviewed.    Significant Imaging: I have reviewed all pertinent imaging results/findings within the past 24 hours.

## 2022-11-09 NOTE — SUBJECTIVE & OBJECTIVE
Interval History: no acute events overnight. Tolerated HD 1/1/8    Review of patient's allergies indicates:   Allergen Reactions    Ampicillin     Peaches [peach (prunus persica)] Other (See Comments)     Pt unable to state type of reaction. Information obtained from daughter who states she was informed of allergy from patient.    Penicillins      Other reaction(s): Hives    Sulfa (sulfonamide antibiotics) Rash and Hives     Current Facility-Administered Medications   Medication Frequency    0.9%  NaCl infusion (for blood administration) Q24H PRN    0.9%  NaCl infusion (for blood administration) Q24H PRN    0.9%  NaCl infusion (for blood administration) Q24H PRN    acetaminophen tablet 650 mg Q4H PRN    albuterol-ipratropium 2.5 mg-0.5 mg/3 mL nebulizer solution 3 mL Q4H PRN    aluminum-magnesium hydroxide-simethicone 200-200-20 mg/5 mL suspension 30 mL QID PRN    amLODIPine tablet 10 mg Daily    atovaquone 750 mg/5 mL oral liquid 1,500 mg Daily    bisacodyL suppository 10 mg Daily PRN    carvediloL tablet 25 mg BID    cloNIDine tablet 0.1 mg Q6H PRN    dextrose 10% bolus 125 mL PRN    dextrose 10% bolus 250 mL PRN    glucagon (human recombinant) injection 1 mg PRN    glucose chewable tablet 16 g PRN    glucose chewable tablet 24 g PRN    heparin (porcine) injection 1,000 Units PRN    hydrALAZINE tablet 100 mg Q8H    insulin aspart U-100 pen 1-10 Units Q6H PRN    levothyroxine tablet 25 mcg Before breakfast    lisinopriL tablet 40 mg Daily    melatonin tablet 6 mg Nightly PRN    methocarbamoL tablet 500 mg TID PRN    methylPREDNISolone sodium succinate injection 20 mg Daily    naloxone 0.4 mg/mL injection 0.02 mg PRN    ondansetron injection 4 mg Q8H PRN    pantoprazole injection 40 mg Daily    prochlorperazine injection Soln 5 mg Q6H PRN    QUEtiapine tablet 25 mg QHS    simethicone chewable tablet 80 mg QID PRN    sodium chloride 0.9% 250 mL flush bag 1 time in Clinic/[facility]    sodium chloride 0.9% flush 10 mL PRN     sodium chloride 0.9% flush 10 mL PRN    sodium chloride 0.9% flush 5 mL PRN     Facility-Administered Medications Ordered in Other Encounters   Medication Frequency    celecoxib capsule 400 mg Once    fentaNYL 50 mcg/mL injection  mcg PRN    LIDOcaine (PF) 10 mg/ml (1%) injection 10 mg Once PRN    LIDOcaine (PF) 10 mg/ml (1%) injection 10 mg Once    midazolam (VERSED) 1 mg/mL injection 0.5-4 mg PRN    ropivacaine 0.2% [facility] PainPRO Pump infusion 500 ML Continuous       Objective:     Vital Signs (Most Recent):  Temp: 98.5 °F (36.9 °C) (11/09/22 1205)  Pulse: 70 (11/09/22 1205)  Resp: 15 (11/09/22 1205)  BP: (!) 142/63 (11/09/22 1205)  SpO2: 99 % (11/09/22 1205)  O2 Device (Oxygen Therapy): room air (11/09/22 0740)   Vital Signs (24h Range):  Temp:  [97.8 °F (36.6 °C)-98.8 °F (37.1 °C)] 98.5 °F (36.9 °C)  Pulse:  [58-87] 70  Resp:  [13-30] 15  SpO2:  [98 %-100 %] 99 %  BP: (131-158)/(63-73) 142/63     Weight: 63 kg (138 lb 14.2 oz) (11/09/22 1000)  Body mass index is 26.24 kg/m².  Body surface area is 1.65 meters squared.    I/O last 3 completed shifts:  In: 1515.3 [I.V.:298.8; Other:300; NG/GT:800; IV Piggyback:116.6]  Out: 1025 [Other:1025]    Physical Exam  Vitals and nursing note reviewed.   Constitutional:       General: She is not in acute distress.     Appearance: She is well-developed. She is ill-appearing. She is not toxic-appearing.      Comments: Patient awake and alert and in no distress   HENT:      Head: Normocephalic and atraumatic.      Mouth/Throat:      Mouth: Mucous membranes are dry.   Eyes:      Conjunctiva/sclera: Conjunctivae normal.   Cardiovascular:      Rate and Rhythm: Normal rate and regular rhythm.      Heart sounds: Normal heart sounds.   Pulmonary:      Effort: Pulmonary effort is normal. No respiratory distress.      Breath sounds: No wheezing or rales.      Comments: Coarse breath sounds bilaterally  Abdominal:      General: Bowel sounds are normal. There is no distension.       Palpations: Abdomen is soft.      Tenderness: There is no abdominal tenderness.   Musculoskeletal:         General: Swelling present. No tenderness. Normal range of motion.      Cervical back: Normal range of motion and neck supple.      Right lower leg: No edema.      Left lower leg: No edema.   Lymphadenopathy:      Cervical: No cervical adenopathy.   Skin:     General: Skin is warm and dry.      Capillary Refill: Capillary refill takes less than 2 seconds.      Findings: No rash.   Neurological:      General: No focal deficit present.      Mental Status: She is alert and oriented to person, place, and time.       Significant Labs:  All labs within the past 24 hours have been reviewed.     Significant Imaging:  Labs: Reviewed

## 2022-11-09 NOTE — PT/OT/SLP PROGRESS
"Physical Therapy Treatment    Patient Name:  Kristin Goodman   MRN:  [id number]    Recommendations:     Discharge Recommendations:  nursing facility, skilled   Discharge Equipment Recommendations: bedside commode   Barriers to discharge: Pt currently requiring increased level of assistance with mobility    Assessment:     Kristin Goodman is a 77 y.o. female admitted with a medical diagnosis of Microscopic polyangiitis.  She presents with the following impairments/functional limitations:  impaired endurance, weakness, impaired functional mobility, impaired balance, gait instability, decreased lower extremity function, pain. Pt demonstrated improved activity tolerance and amb distance of 4 side steps + 12' RW CGA. Pt continues to benefit from skilled PT services while in house in order to address the aforementioned deficits.    Rehab Prognosis: Good; patient would benefit from acute skilled PT services to address these deficits and reach maximum level of function.    Recent Surgery: * No surgery found *      Plan:     During this hospitalization, patient to be seen 3 x/week to address the identified rehab impairments via gait training, therapeutic activities, therapeutic exercises, neuromuscular re-education and progress toward the following goals:    Plan of Care Expires:  11/30/22    Subjective     "I feel nauseous"    Pain/Comfort:  Pain Rating 1:  (c/o nausea)  Location - Orientation 1: generalized  Location 1: abdomen      Objective:     Communicated with RN prior to session.  Patient found HOB elevated with NG tube, telemetry, pulse ox (continuous) upon PT entry to room.     General Precautions: Standard, fall   Orthopedic Precautions:N/A   Braces: N/A  Respiratory Status: Room air     Functional Mobility:  Bed Mobility:     Rolling Right: minimum assistance  Scooting: stand by assistance  Supine to Sit: minimum assistance  Sit to Supine: minimum assistance  Transfers:     Sit to Stand:  minimum assistance with " rolling walker  Gait: pt amb 4 side steps RW CGA + 12' RW CGA  with seated rest break in between bouts. Pt presented with B shortened step, decreased obey, head down. Pt had one brief standing break in between last bout.  Balance:   Good sitting balance  Fair standing balance      AM-PAC 6 CLICK MOBILITY  Turning over in bed (including adjusting bedclothes, sheets and blankets)?: 3  Sitting down on and standing up from a chair with arms (e.g., wheelchair, bedside commode, etc.): 3  Moving from lying on back to sitting on the side of the bed?: 3  Moving to and from a bed to a chair (including a wheelchair)?: 3  Need to walk in hospital room?: 3  Climbing 3-5 steps with a railing?: 2  Basic Mobility Total Score: 17       Treatment & Education:  Educated pt on PT role/POC  Educated pt on importance of OOB activity and daily ambulation   Pt educated on proper body mechanics, safety techniques, and energy conservation with PT facilitation and cueing throughout session   Pt verbalized understanding      Patient left HOB elevated with all lines intact, call button in reach, RN notified, and older sister present..    GOALS:   Multidisciplinary Problems       Physical Therapy Goals          Problem: Physical Therapy    Goal Priority Disciplines Outcome Goal Variances Interventions   Physical Therapy Goal     PT, PT/OT Ongoing, Progressing     Description: Goals to be met by: 2022     Patient will increase functional independence with mobility by performin. Supine to sit with contact guard assistance  2. Sit to supine with contact guard assistance  3. Sit to stand transfer with minimum assistance MET   3a. STS supvn LRAD  4. Gait  x 40 feet with minimum assistance using LRAD as needed  5. Lower extremity exercise program x10 reps per handout, with independence                        Time Tracking:     PT Received On: 22  PT Start Time: 1422     PT Stop Time: 1448  PT Total Time (min): 26 min      Billable Minutes: Gait Training 26    Treatment Type: Treatment  PT/PTA: PT     PTA Visit Number: 1     11/09/2022

## 2022-11-09 NOTE — PLAN OF CARE
Problem: SLP  Goal: SLP Goal  Description: Speech Language Pathology Goals  Goals expected to be met by 11/14/22    1. Pt will participate in ongoing assessment of swallow function to determine safest, least restrictive means of nutrition/hydration  2. Educate Pt and family on aspiration precautions and SLP POC  3. Pt will tolerate trials of nectar-thickened liquids w/o overt S/S aspiration, MIN A  4. Pt will complete dysphagia exercises to improve timing of initiation of swallow x5 with 90% accuracy, MIN A    Outcome: Ongoing, Progressing     Pt seen for dysphagia therapy. Pt with improved timing of initiation of swallow this service day. REC: continue temporary, alternative means nutrition/hydration/medication to ensure adequate nutrition/hydration and cautiously initiate pleasure feeds of puree and thin liquids sparingly for comfort provided strict monitoring for S/S aspiration, only when Pt is awake, alert, upright, vital signs stable, 1:1 supervision and single bites/sips (no straws). ST to continue to follow.     11/9/2022

## 2022-11-09 NOTE — PLAN OF CARE
Recommendations     1. As tolerated, increase TF rate (of Novasource) to 35 mL/hr - 1680 kcals, 76 g of protein, 602 mL fluid.   2. RD to monitor & follow-up.     Goals: Meet % EEN, EPN by RD f/u date  Nutrition Goal Status: progressing towards goal  Communication of RD Recs: reviewed with RN

## 2022-11-09 NOTE — PROGRESS NOTES
[facility] ([address] Medicine  Progress Note    Patient Name: Kristin Goodman  MRN: [id number]  Patient Class: IP- Inpatient   Admission Date: 10/17/2022  Length of Stay: 23 days  Attending Physician: Susi Urbina MD  Primary Care Provider: Lori Hernandez MD        Subjective:     Principal Problem:Microscopic polyangiitis        HPI:  Kristin Goodman is a 77 y.o. female with a past medical history of HTN, hypothyroidism, HFpEF, and osteoarthritis of the arm who has presented to the ED for cough, SOB, and weakness. Daughter is present at the bedside. Patient presented to the ED on 9/24 with hemoptysis, CT and CXR showed high suspicion for multilobar pneumonia. Patient was discharged with a 10-day course of levofloxacin and an albuterol inhaler. Patient followed up with her PCP on 10/4 with slight improvement in symptoms and went back to work. Patient continued to have worsening symptoms and presented to the ED again on 10/14 for evaluation and no interventions were done. Patient endorses fevers over the last few days up to 102.4 F with progressive SOB. She endorses hemoptysis with moderate amount of blood, generalized weakness, productive cough, SOB, and loss of appetite. Denies chest pain, nausea, vomiting, abdominal pain, or urinary changes.    ED: hypertensive up to 219/93 and tachycardic up to 117. Oxygen saturation on 92% on RA, placed on 5L NC with sats >97%. CBC remarkable for leukocytosis of 16.03 and Hb 7.7. K 3.3. Cr 1.6, baseline ~1.0. . Troponin 0.061. COVID and flu negative. EKG NSR. CT chest with contrast pending at time of admission. Given home amlodipine and lisinopril. Given 500mL NS, IV azithromycin, cefepime and vanc.       Overview/Hospital Course:  Ms. Goodman was admitted to Hospital Medicine for management of multifocal pneumonia and acute hypoxemic respiratory failure after presenting to the ED with fevers, cough, hemoptysis, and dyspnea. She required  escalation to the Medical ICU due to abrupt decline in her respiratory status, associated with hemoptysis and requiring NIPPV. CT chest showed bilateral patchy ground glass opacities. Labs additionally were notable for acute renal failure on CKD3. Pulmonology was consulted while under the care of [facility] and felt this picture was consistent with diffuse alveolar hemorrhage.     Her workup revealed a strongly positive MPO Ab consistent with clinical picture of microscopic polyangiitis with pulmonary and renal involvement. She underwent Trialysis catheter placement 10/25/2022 in the Medical ICU. 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     Rheumatology was consulted, extensive workup revealed MITUL + 1:2560 homogenous, +dsDNA, normal complements, PR3 1.2 (slight +),  (+), cANCA + 1:80, pANCA neg, GBMAb neg, trace cryos. Due to concern for MPA, she was started on pulse dose IV methylprednisolone 1000mg for 3 days, and plasmapheresis under the guidance of Transfusion Medicine. She remains on a steroid taper and has completed PLEX. She additionally received rituximab and cyclophosphamide. OI prophylaxis was provided with atovaquone due to a sulfa allergy.     Nephrology was consulted for evaluation of acute renal failure in the setting of MPA. She did require SLED in the ICU for renal clearance and remains on intermittent hemodialysis. She is expected to continue HD once discharged.     Her acute hypoxemic respiratory failure improved and she was weaned off of supplemental oxygen. She stepped down to [facility] 10/28.     She underwent MBBS for evaluation of dysphagia, which showed global delayed initiation of swallow and aspiration with thin liquids. Due to  concern for possible prior stroke, head imaging was completed and negative for acute finding. She is NPO with NG tube. ENT was consulted for evaluation of dysphagia and cervical adenopathy.  Patient did not tolerate laryngoscopy; unable to visualize vocal cords but given her lack of hoarseness, they did not suspect vocal fold paresis/paralysis. MRI recommended by rheum to fully rule out any potential neurological cause of the patient's dysphagia; but demonstrated   remote left thalamic lacunar type infarct and punctate remote left cerebellar infarcts.  She is continuing to work with speech therapy.      She is due rituximab and cyclophosphamide on 11/10.     Her other chronic medical conditions including hypothyroidism, diastolic CHF, and hypertension were managed with her home medications, with dose adjustments as needed.         Interval History: No acute events overnight.  She had dohoff placed yesterday for more comfort.  Tolerating tube feeds.  Working with speech with hopes of passing swallow exam. Continues to undergo HD.  Denies pain, nausea, vomiting.      Review of Systems   Constitutional:  Positive for fatigue. Negative for fever.   HENT:  Positive for trouble swallowing. Negative for sore throat.    Respiratory:  Negative for cough and shortness of breath.    Cardiovascular:  Negative for chest pain and leg swelling.   Gastrointestinal:  Negative for abdominal pain and nausea.   Neurological:  Positive for weakness. Negative for headaches.   Psychiatric/Behavioral:  Negative for decreased concentration and sleep disturbance.    Objective:     Vital Signs (Most Recent):  Temp: 98.5 °F (36.9 °C) (11/09/22 1205)  Pulse: 70 (11/09/22 1205)  Resp: 15 (11/09/22 1205)  BP: (!) 142/63 (11/09/22 1205)  SpO2: 99 % (11/09/22 1205)   Vital Signs (24h Range):  Temp:  [97.8 °F (36.6 °C)-98.8 °F (37.1 °C)] 98.5 °F (36.9 °C)  Pulse:  [58-87] 70  Resp:  [13-30] 15  SpO2:  [98 %-100 %] 99 %  BP: (131-158)/(63-73) 142/63      Weight: 63 kg (138 lb 14.2 oz)  Body mass index is 26.24 kg/m².    Intake/Output Summary (Last 24 hours) at 11/9/2022 1349  Last data filed at 11/9/2022 0616  Gross per 24 hour   Intake 516.55 ml   Output --   Net 516.55 ml      Physical Exam  Vitals reviewed.   Constitutional:       General: She is awake.      Appearance: She is well-developed.   HENT:      Head: Normocephalic and atraumatic.      Nose:      Comments: + NGT     Mouth/Throat:      Mouth: Mucous membranes are dry.      Comments: + nasogastric feeding tube  Eyes:      General: Lids are normal. No scleral icterus.     Conjunctiva/sclera: Conjunctivae normal.   Cardiovascular:      Rate and Rhythm: Normal rate and regular rhythm.   Pulmonary:      Effort: Pulmonary effort is normal.      Breath sounds: Normal breath sounds. No wheezing or rhonchi.      Comments: Diminished right lower lobe breath sounds   Abdominal:      General: Bowel sounds are normal.      Palpations: Abdomen is soft.   Musculoskeletal:         General: No deformity.      Right lower leg: No edema.      Left lower leg: No edema.   Skin:     General: Skin is warm and dry.   Neurological:      General: No focal deficit present.      Mental Status: She is alert. Mental status is at baseline.   Psychiatric:         Attention and Perception: Attention normal.         Mood and Affect: Mood normal.       Significant Labs: All pertinent labs within the past 24 hours have been reviewed.    Significant Imaging: I have reviewed all pertinent imaging results/findings within the past 24 hours.      Assessment/Plan:      * Microscopic polyangiitis  As of 10/26/22 strongly positive MPO Ab (in contrast to borderline positive PR3), consistent with clinical picture of microscopic polyangiitis with pulmonary, and renal involvement after presenting with acute hypoxemic respiratory failure, hemoptysis, and acute renal failure.  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   3)  CHANGES SUGGESTIVE OF FOCAL ACUTE PYELONEPHRITIS.   4)  MILD-TO-MODERATE ARTERIONEPHROSCLEROSIS   - Rheumatology consulted, appreciate evaluation and recommendations     - MITUL + 1:2560 homogenous, +dsDNA, normal complements     - PR3 1.2 (slight +),  (+)     - cANCA + 1:80, pANCA neg     - GBMAb neg, trace cryos  - Transfusion Medicine consulted for apheresis  - Nephrology consulted, see separate documentation for WILFREDO      Plan  - Steroids:    - completed IV methylprednisolone 1000mg x 3 days   - now on steroid taper as guided by Rheumatology   - currently methylprednisolone 20mg IV daily   - plan for 20mg IV daily on 11/6 for 14 days (last dose of 20mg equivalent is 11/20/2022).   - apheresis: underwent PLEX 10/26, 10/27, 10/30, 11/1, 11/2, 11/3  - rituximab every 7 days for 4 doses: Dose #1: 10/27, #2 11/3, next dose 11/10  - cyclophosphamide every 14 days for 2 doses: Dose #1 10/27, next dose 11/10  - Opportunistic Infection ppx: atovaquone 1500mg PO daily  - GI bleed prophylaxis: pantoprazole 40mg daily     Acute hypoxemic respiratory failure  Multifocal pneumonia  Hemoptysis  Dyspnea  Diffuse Alveolar Hemorrahge  In the setting of a new diagnosis of microscopic polyangiitis, presented with fevers, dyspnea,.  - Chest imaging was consistent with diffuse alveolar hemorrhage.  CT chest wc 10/17 with bl perihilar dense consolidations w/ peripheral patcy areas of GGO, BL PNA, less c/f cardiogenic pulmonary edema.  - Patient upgraded to Medical ICU 10/23/22 with abrupt respiratory decline requiring NIPPV,  improved with high dose IV steroids, plasmapheresis, emergent hemodialysis.   - Unable to perform bronchoscopy in the Medical ICU due to tenuous respiratory status  - As of 11/6, hypoxemia has significantly improved    Plan:  - continue management of hypoxemia with supplemental oxygen  - continue management of underlying triggers with steroid and immunosuppressants  - Wean supplemental O2 as tolerated  - incentive spirometry and respiratory hygiene    Acute renal failure superimposed on stage 3 chronic kidney disease  Due to microscopic polyangiitis. Remains on hemodialysis intermittently.   - Baseline creatinine 1.0-1.2.   - New onset proteinuria/hematuria.   - Renal biopsy completed and   - Trialysis in place for intermittent Hemodialysis    Plan  - Nephrology consulted, appreciate management  - management of MPA as noted separately  - Renal function panel daily  - renally dose all medications  - intermittent Hemodialysis as indicated, per Nephrology     Acute blood loss anemia  In the setting of GI bleed with melena  - Evaluated by GI, see GI bleed documentation  - Last transfusion 10/28, Hgb slowly trending down since then  - Hgb 6.9 on 11/5      Plan  - Monitor CBC Daily   - Treat with pRBC transfusion PRN Hgb <7  - qualifies for transfusion on 11/5 with Hgb 6.9, 1u pRBC ordered      Gastrointestinal hemorrhage with melena  Starting having hemoptysis and melena with associated acute blood loss anemia earlier in hospital stay. Patient with known internal hemorrhoids, mild sigmoid diverticulosis, history of gastritis and + H pylori (2012 EGD).   - GI consulted 10/19, unable to perform EGD due to instability and respiratory failure at the time    Plan  - patient unable to take PO PPI due to NGT removal on 11/5, transition to IV PPI 40mg pantoprazole daily, return to per NG tube once replaced  - Monitor CBC closely for signs of recurrent bleed          Dysphagia  SLP following  MBSS showing global weakness in  swallowing as well as aspiration with thin liquids.   ENT consulted but patient did not tolerate laryngoscopy     Plan   - Discussed case with Rheumatology team, they are concerned that this dysphagia may have been from prior stroke, requesting imaging for evaluation - have graciously ordered head imaging on behalf of this patient -  CT demonstrated no acute findings or causes for dysphagia NOR did MRI   - remove NGT and place dobhoff which is more comfortable and makes swallowing easier compared to NGT.    - continue working with speech therapy     Chronic diastolic heart failure  Pulmonary Hypertension due to left heart disease  TTE (10/2022) EF 65%, G1DD  Home meds: Lisinopril, Toprol     Plan  - Active volume control with intermittent Hemodialysis per Nephrology   - Daily weights (standing if tolerated)  - Strict I/Os  - Fluid restriction 1.5 day  - Cardiac diet w/ 2 g Na restriction      Lymphadenopathy of head and neck  - Has bilateral neck gland swelling with tenderness,consulted ENT  - No surgical intervention indicated   - Adenopathy likely reactive in nature   - Recommend further workup if it does not resolve within next 2 weeks     Moderate malnutrition  Nutrition consulted. Most recent weight and BMI monitored-          Malnutrition (Moderate to Severe)  Weight Loss (Malnutrition): 7.5% in 3 months              Measurements:  Wt Readings from Last 1 Encounters:   11/09/22 63 kg (138 lb 14.2 oz)   Body mass index is 26.24 kg/m².    Recommendations: Recommendation/Intervention: 1. As tolerated, increase TF rate (of Novasource) to 35 mL/hr  - 2. RD to monitor & follow-up.  Goals: Meet % EEN, EPN by RD f/u date    Patient has been screened and assessed by RD. RD will follow patient.      Primary hypertension  BP Readings from Last 1 Encounters:   11/05/22 (!) 159/69     - Patient is unable to tolerate PO mediations, all meds to be administered via NG tube    amLODIPine tablet 10 mg, 10 mg, Per NG  tube, Daily    carvediloL tablet 12.5 mg, 12.5 mg, Per NG tube, BID    hydrALAZINE tablet 25 mg, 25 mg, Per NG tube, Q8H    lisinopriL tablet 40 mg, 40 mg, Per NG tube, Daily    Plan update:  - NG unable to be replaced temporarily on 11/5, transition to IV formulation until replaced    hydrALAZINE injection 10 mg, 10 mg, Intravenous, Q6H PRN SBP >160  - Vitals Q4hr    Hypothyroid  - Continue synthroid 25 mcg  - TSH 0.585 10/27/22      VTE Risk Mitigation (From admission, onward)         Ordered     heparin (porcine) injection 1,000 Units  As needed (PRN)         11/08/22 0854     Place sequential compression device  Until discontinued         10/25/22 1731     IP VTE HIGH RISK PATIENT  Once         10/17/22 1354     Reason for No Pharmacological VTE Prophylaxis  Once        Question:  Reasons:  Answer:  Risk of Bleeding    10/17/22 1354                Discharge Planning   ANTHONY: 11/15/2022     Code Status: Full Code   Is the patient medically ready for discharge?: No    Reason for patient still in hospital (select all that apply): Patient trending condition, Treatment and Pending disposition  Discharge Plan A: Home with family                  Susi Urbina MD  Department of Hospital Medicine   [facility] - Intensive Care ([facility]

## 2022-11-09 NOTE — PLAN OF CARE
Problem: Adult Inpatient Plan of Care  Goal: Plan of Care Review  Outcome: Ongoing, Progressing  Goal: Patient-Specific Goal (Individualized)  Outcome: Ongoing, Progressing  Goal: Absence of Hospital-Acquired Illness or Injury  Outcome: Ongoing, Progressing  Goal: Optimal Comfort and Wellbeing  Outcome: Ongoing, Progressing  Goal: Readiness for Transition of Care  Outcome: Ongoing, Progressing     Problem: Infection  Goal: Absence of Infection Signs and Symptoms  Outcome: Ongoing, Progressing     Problem: Infection Progression (Sepsis/Septic Shock)  Goal: Absence of Infection Signs and Symptoms  Outcome: Ongoing, Progressing     Problem: Nutrition Impaired (Sepsis/Septic Shock)  Goal: Optimal Nutrition Intake  Outcome: Ongoing, Progressing     Problem: Oral Intake Inadequate (Acute Kidney Injury/Impairment)  Goal: Optimal Nutrition Intake  Outcome: Ongoing, Progressing     Problem: Renal Function Impairment (Acute Kidney Injury/Impairment)  Goal: Effective Renal Function  Outcome: Ongoing, Progressing

## 2022-11-09 NOTE — NURSING
Blood glucose 68 @ 2327. Given Dextrose 10% 125 ml bolus per MAR. Repeat blood glucose @ 0006 128. Patient oriented x 4. Denies any discomforts. Will continue to monitor.

## 2022-11-09 NOTE — ASSESSMENT & PLAN NOTE
"Patient with baseline creatinine of 1 as recent as August 2022 with progressive worsening beginning in early October 1.3 (10/13)->1.6 (10/17)->3.0 (10/23) despite IV fluids.  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  Patient noted to have new onset proteinuria and hematuria over the last 1 month.  Additionally, would consider ATN in the setting of suspected sepsis from multifocal pneumonia.      Concerns for glomerulonephritis given patient's current clinical picture. Initial urine microscopy demonstrating muddy brown casts with likely acanthocytes noted as well. MITUL positive, proteinase 3 ab weakly positive, MPO strongly positive. Patient s/p high-dose IV solumedrol x3 doses, now on Solumedrol 50mg qd. Underwent kidney bx 10/27. Rheumatology consulted, and patient started on Plex, Ritux/cytoxan. Given continually worsening renal function and uremic encephalopathy, patient underwent SLED without complication on 10/27. Transferred to floor on 10/29. Significantly improved mentation on 10/31. Underwent HD 11/1. Patient also with increasing hypernatremia so added FWF to tube feeds.      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 Second dose Ritux on 11/3. Next dose of cytoxan on 11/10. Will discuss with Rheum regarding maintenance dosing.      Recommendations:  - HD last on 11/8  - plan for next HD session 11/9   - continue steroid taper per rheum   - strict intake and output  - renally dose medications and avoid nephrotoxins when possible  - replete electrolytes as appropriate  "

## 2022-11-09 NOTE — PLAN OF CARE
NGT removed from right nare, Dr Urbina in room Dobhoff feeding tube 12 fr inserted right nare secured to bridge of nose air bolus 30cc auscultated. KUB ordered.

## 2022-11-09 NOTE — PLAN OF CARE
Problem: Physical Therapy  Goal: Physical Therapy Goal  Description: Goals to be met by: 2022     Patient will increase functional independence with mobility by performin. Supine to sit with contact guard assistance  2. Sit to supine with contact guard assistance  3. Sit to stand transfer with minimum assistance MET   3a. STS supvn LRAD  4. Gait  x 40 feet with minimum assistance using LRAD as needed  5. Lower extremity exercise program x10 reps per handout, with independence   Outcome: Ongoing, Progressing

## 2022-11-09 NOTE — ASSESSMENT & PLAN NOTE
Nutrition consulted. Most recent weight and BMI monitored-          Malnutrition (Moderate to Severe)  Weight Loss (Malnutrition): 7.5% in 3 months              Measurements:  Wt Readings from Last 1 Encounters:   11/09/22 63 kg (138 lb 14.2 oz)   Body mass index is 26.24 kg/m².    Recommendations: Recommendation/Intervention: 1. As tolerated, increase TF rate (of Novasource) to 35 mL/hr  - 2. RD to monitor & follow-up.  Goals: Meet % EEN, EPN by RD f/u date    Patient has been screened and assessed by RD. RD will follow patient.

## 2022-11-10 PROBLEM — R34 ANURIA: Status: ACTIVE | Noted: 2022-11-10

## 2022-11-10 PROBLEM — Z79.899 HIGH RISK MEDICATIONS (NOT ANTICOAGULANTS) LONG-TERM USE: Status: ACTIVE | Noted: 2022-11-10

## 2022-11-10 PROBLEM — Z99.2 DEPENDENCE ON HEMODIALYSIS: Status: ACTIVE | Noted: 2022-11-10

## 2022-11-10 PROBLEM — K21.9 GASTRIC REFLUX: Status: ACTIVE | Noted: 2022-11-10

## 2022-11-10 PROBLEM — N01.9 PAUCI-IMMUNE RPGN (RAPIDLY PROGRESSIVE GLOMERULONEPHRITIS): Status: ACTIVE | Noted: 2022-11-10

## 2022-11-10 PROBLEM — N17.0 ACUTE RENAL FAILURE WITH TUBULAR NECROSIS: Status: ACTIVE | Noted: 2022-10-26

## 2022-11-10 PROBLEM — R31.9 HEMATURIA SYNDROME: Status: ACTIVE | Noted: 2022-11-10

## 2022-11-10 LAB
ALBUMIN SERPL BCP-MCNC: 2.7 G/DL (ref 3.5–5.2)
ALP SERPL-CCNC: 71 U/L (ref 55–135)
ALT SERPL W/O P-5'-P-CCNC: 23 U/L (ref 10–44)
ANION GAP SERPL CALC-SCNC: 16 MMOL/L (ref 8–16)
AST SERPL-CCNC: 20 U/L (ref 10–40)
BASOPHILS # BLD AUTO: 0 K/UL (ref 0–0.2)
BASOPHILS NFR BLD: 0 % (ref 0–1.9)
BILIRUB SERPL-MCNC: 0.5 MG/DL (ref 0.1–1)
BUN SERPL-MCNC: 72 MG/DL (ref 8–23)
CALCIUM SERPL-MCNC: 7.8 MG/DL (ref 8.7–10.5)
CHLORIDE SERPL-SCNC: 101 MMOL/L (ref 95–110)
CO2 SERPL-SCNC: 17 MMOL/L (ref 23–29)
CREAT SERPL-MCNC: 4.7 MG/DL (ref 0.5–1.4)
DIFFERENTIAL METHOD: ABNORMAL
EOSINOPHIL # BLD AUTO: 0 K/UL (ref 0–0.5)
EOSINOPHIL NFR BLD: 0.2 % (ref 0–8)
ERYTHROCYTE [DISTWIDTH] IN BLOOD BY AUTOMATED COUNT: 16.2 % (ref 11.5–14.5)
EST. GFR  (NO RACE VARIABLE): 9.2 ML/MIN/1.73 M^2
GLUCOSE SERPL-MCNC: 120 MG/DL (ref 70–110)
HCT VFR BLD AUTO: 25.8 % (ref 37–48.5)
HGB BLD-MCNC: 8.4 G/DL (ref 12–16)
IMM GRANULOCYTES # BLD AUTO: 0.03 K/UL (ref 0–0.04)
IMM GRANULOCYTES NFR BLD AUTO: 0.3 % (ref 0–0.5)
LYMPHOCYTES # BLD AUTO: 0.8 K/UL (ref 1–4.8)
LYMPHOCYTES NFR BLD: 8.7 % (ref 18–48)
MAGNESIUM SERPL-MCNC: 1.8 MG/DL (ref 1.6–2.6)
MCH RBC QN AUTO: 28.9 PG (ref 27–31)
MCHC RBC AUTO-ENTMCNC: 32.6 G/DL (ref 32–36)
MCV RBC AUTO: 89 FL (ref 82–98)
MONOCYTES # BLD AUTO: 0.5 K/UL (ref 0.3–1)
MONOCYTES NFR BLD: 5.2 % (ref 4–15)
NEUTROPHILS # BLD AUTO: 7.6 K/UL (ref 1.8–7.7)
NEUTROPHILS NFR BLD: 85.6 % (ref 38–73)
NRBC BLD-RTO: 0 /100 WBC
PHOSPHATE SERPL-MCNC: 5.5 MG/DL (ref 2.7–4.5)
PLATELET # BLD AUTO: 141 K/UL (ref 150–450)
PMV BLD AUTO: 11.2 FL (ref 9.2–12.9)
POCT GLUCOSE: 119 MG/DL (ref 70–110)
POCT GLUCOSE: 135 MG/DL (ref 70–110)
POCT GLUCOSE: 159 MG/DL (ref 70–110)
POCT GLUCOSE: 188 MG/DL (ref 70–110)
POTASSIUM SERPL-SCNC: 4 MMOL/L (ref 3.5–5.1)
PROT SERPL-MCNC: 5.3 G/DL (ref 6–8.4)
RBC # BLD AUTO: 2.91 M/UL (ref 4–5.4)
SODIUM SERPL-SCNC: 134 MMOL/L (ref 136–145)
WBC # BLD AUTO: 8.89 K/UL (ref 3.9–12.7)

## 2022-11-10 PROCEDURE — 63600175 PHARM REV CODE 636 W HCPCS: Performed by: INTERNAL MEDICINE

## 2022-11-10 PROCEDURE — 99233 PR SUBSEQUENT HOSPITAL CARE,LEVL III: ICD-10-PCS | Mod: ,,, | Performed by: STUDENT IN AN ORGANIZED HEALTH CARE EDUCATION/TRAINING PROGRAM

## 2022-11-10 PROCEDURE — 63600175 PHARM REV CODE 636 W HCPCS: Performed by: STUDENT IN AN ORGANIZED HEALTH CARE EDUCATION/TRAINING PROGRAM

## 2022-11-10 PROCEDURE — C9113 INJ PANTOPRAZOLE SODIUM, VIA: HCPCS | Performed by: STUDENT IN AN ORGANIZED HEALTH CARE EDUCATION/TRAINING PROGRAM

## 2022-11-10 PROCEDURE — 25000003 PHARM REV CODE 250: Performed by: HOSPITALIST

## 2022-11-10 PROCEDURE — 85025 COMPLETE CBC W/AUTO DIFF WBC: CPT

## 2022-11-10 PROCEDURE — C9113 INJ PANTOPRAZOLE SODIUM, VIA: HCPCS | Performed by: INTERNAL MEDICINE

## 2022-11-10 PROCEDURE — 84100 ASSAY OF PHOSPHORUS: CPT

## 2022-11-10 PROCEDURE — 99900035 HC TECH TIME PER 15 MIN (STAT)

## 2022-11-10 PROCEDURE — 25000003 PHARM REV CODE 250: Performed by: STUDENT IN AN ORGANIZED HEALTH CARE EDUCATION/TRAINING PROGRAM

## 2022-11-10 PROCEDURE — 97535 SELF CARE MNGMENT TRAINING: CPT

## 2022-11-10 PROCEDURE — 83735 ASSAY OF MAGNESIUM: CPT

## 2022-11-10 PROCEDURE — 99233 SBSQ HOSP IP/OBS HIGH 50: CPT | Mod: ,,, | Performed by: STUDENT IN AN ORGANIZED HEALTH CARE EDUCATION/TRAINING PROGRAM

## 2022-11-10 PROCEDURE — 25000003 PHARM REV CODE 250: Performed by: INTERNAL MEDICINE

## 2022-11-10 PROCEDURE — 63600175 PHARM REV CODE 636 W HCPCS: Mod: JG | Performed by: INTERNAL MEDICINE

## 2022-11-10 PROCEDURE — 20600001 HC STEP DOWN PRIVATE ROOM

## 2022-11-10 PROCEDURE — 36415 COLL VENOUS BLD VENIPUNCTURE: CPT

## 2022-11-10 PROCEDURE — 94761 N-INVAS EAR/PLS OXIMETRY MLT: CPT

## 2022-11-10 PROCEDURE — 80053 COMPREHEN METABOLIC PANEL: CPT

## 2022-11-10 RX ORDER — ONDANSETRON 4 MG/1
8 TABLET, FILM COATED ORAL
Status: COMPLETED | OUTPATIENT
Start: 2022-11-10 | End: 2022-11-10

## 2022-11-10 RX ORDER — MEPERIDINE HYDROCHLORIDE 50 MG/ML
25 INJECTION INTRAMUSCULAR; INTRAVENOUS; SUBCUTANEOUS
Status: DISPENSED | OUTPATIENT
Start: 2022-11-10 | End: 2022-11-11

## 2022-11-10 RX ORDER — ACETAMINOPHEN 325 MG/1
650 TABLET ORAL
Status: COMPLETED | OUTPATIENT
Start: 2022-11-10 | End: 2022-11-10

## 2022-11-10 RX ORDER — METHYLPREDNISOLONE SOD SUCC 125 MG
100 VIAL (EA) INJECTION
Status: COMPLETED | OUTPATIENT
Start: 2022-11-10 | End: 2022-11-10

## 2022-11-10 RX ORDER — HEPARIN 100 UNIT/ML
500 SYRINGE INTRAVENOUS
Status: DISCONTINUED | OUTPATIENT
Start: 2022-11-10 | End: 2022-12-13 | Stop reason: HOSPADM

## 2022-11-10 RX ORDER — FAMOTIDINE 10 MG/ML
20 INJECTION INTRAVENOUS
Status: COMPLETED | OUTPATIENT
Start: 2022-11-10 | End: 2022-11-10

## 2022-11-10 RX ORDER — PANTOPRAZOLE SODIUM 40 MG/10ML
40 INJECTION, POWDER, LYOPHILIZED, FOR SOLUTION INTRAVENOUS EVERY 12 HOURS
Status: DISCONTINUED | OUTPATIENT
Start: 2022-11-10 | End: 2022-11-18

## 2022-11-10 RX ORDER — ONDANSETRON 4 MG/1
8 TABLET, FILM COATED ORAL
Status: CANCELLED
Start: 2022-11-17

## 2022-11-10 RX ORDER — SODIUM CHLORIDE 0.9 % (FLUSH) 0.9 %
10 SYRINGE (ML) INJECTION
Status: DISCONTINUED | OUTPATIENT
Start: 2022-11-10 | End: 2022-12-13 | Stop reason: HOSPADM

## 2022-11-10 RX ORDER — LIDOCAINE HYDROCHLORIDE 20 MG/ML
15 SOLUTION OROPHARYNGEAL ONCE
Status: COMPLETED | OUTPATIENT
Start: 2022-11-10 | End: 2022-11-10

## 2022-11-10 RX ORDER — MAG HYDROX/ALUMINUM HYD/SIMETH 200-200-20
30 SUSPENSION, ORAL (FINAL DOSE FORM) ORAL ONCE
Status: COMPLETED | OUTPATIENT
Start: 2022-11-10 | End: 2022-11-10

## 2022-11-10 RX ADMIN — CARVEDILOL 25 MG: 25 TABLET, FILM COATED ORAL at 09:11

## 2022-11-10 RX ADMIN — DIPHENHYDRAMINE HYDROCHLORIDE 25 MG: 50 INJECTION, SOLUTION INTRAMUSCULAR; INTRAVENOUS at 04:11

## 2022-11-10 RX ADMIN — SODIUM CHLORIDE: 0.9 INJECTION, SOLUTION INTRAVENOUS at 04:11

## 2022-11-10 RX ADMIN — ALUMINUM HYDROXIDE, MAGNESIUM HYDROXIDE, AND SIMETHICONE 30 ML: 200; 200; 20 SUSPENSION ORAL at 06:11

## 2022-11-10 RX ADMIN — HYDRALAZINE HYDROCHLORIDE 100 MG: 50 TABLET ORAL at 05:11

## 2022-11-10 RX ADMIN — HYDRALAZINE HYDROCHLORIDE 100 MG: 50 TABLET ORAL at 03:11

## 2022-11-10 RX ADMIN — ATOVAQUONE 1500 MG: 750 SUSPENSION ORAL at 08:11

## 2022-11-10 RX ADMIN — MESNA 96 MG: 100 INJECTION, SOLUTION INTRAVENOUS at 09:11

## 2022-11-10 RX ADMIN — CARVEDILOL 25 MG: 25 TABLET, FILM COATED ORAL at 08:11

## 2022-11-10 RX ADMIN — HYDRALAZINE HYDROCHLORIDE 100 MG: 50 TABLET ORAL at 09:11

## 2022-11-10 RX ADMIN — LEVOTHYROXINE SODIUM 25 MCG: 25 TABLET ORAL at 05:11

## 2022-11-10 RX ADMIN — ACETAMINOPHEN 650 MG: 325 TABLET ORAL at 03:11

## 2022-11-10 RX ADMIN — ONDANSETRON HYDROCHLORIDE 8 MG: 4 TABLET, FILM COATED ORAL at 07:11

## 2022-11-10 RX ADMIN — METHYLPREDNISOLONE SODIUM SUCCINATE 100 MG: 125 INJECTION, POWDER, FOR SOLUTION INTRAMUSCULAR; INTRAVENOUS at 04:11

## 2022-11-10 RX ADMIN — LISINOPRIL 40 MG: 20 TABLET ORAL at 09:11

## 2022-11-10 RX ADMIN — METHYLPREDNISOLONE SODIUM SUCCINATE 20 MG: 40 INJECTION, POWDER, FOR SOLUTION INTRAMUSCULAR; INTRAVENOUS at 08:11

## 2022-11-10 RX ADMIN — AMLODIPINE BESYLATE 10 MG: 10 TABLET ORAL at 09:11

## 2022-11-10 RX ADMIN — RITUXIMAB 600 MG: 10 INJECTION, SOLUTION INTRAVENOUS at 05:11

## 2022-11-10 RX ADMIN — FAMOTIDINE 20 MG: 10 INJECTION INTRAVENOUS at 04:11

## 2022-11-10 RX ADMIN — PANTOPRAZOLE SODIUM 40 MG: 40 INJECTION, POWDER, FOR SOLUTION INTRAVENOUS at 08:11

## 2022-11-10 RX ADMIN — CYCLOPHOSPHAMIDE 480 MG: 200 INJECTION, SOLUTION INTRAVENOUS at 10:11

## 2022-11-10 RX ADMIN — LIDOCAINE HYDROCHLORIDE 15 ML: 20 SOLUTION ORAL; TOPICAL at 06:11

## 2022-11-10 NOTE — PROGRESS NOTES
[facility] ([place] [id number])  Nephrology  Progress Note    Patient Name: Kristin Goodman  MRN: [id number]  Admission Date: 10/17/2022  Hospital Length of Stay: 24 days  Attending Provider: Danielle Palomino MD   Primary Care Physician: Lori Hernandez MD  Principal Problem:Microscopic polyangiitis    Subjective:     HPI: Kristin Goodman is a 77 year old female with hypertension, hypothyroidism, HFpEF who presents for cough, shortness of breath, and weakness.  Patient initially presented to ER on 09/24 with hemoptysis and imaging concerning for multilobar pneumonia at which time she was discharged on a 10 day course of levofloxacin.  Patient initially had some improvement but began having worsening symptoms on 10/14.  Patient describes multiple fevers and progressive shortness of breath.  She continues to have intermittent hemoptysis.  Patient with worsening leukocytosis and limited clinical improvement despite broad-spectrum antibiotics.  She remains on cefepime.  Patient is a noted to have baseline creatinine of 1 as recent as 8/2022 but progressive worsening of creatinine in early October.  On admission patient with creatinine of 1.6 (10/17) with subsequent progression and creatinine of 3.0 at time of consultation (10/23).  Nephrology consulted for acute kidney injury.      Interval History: scheduled to get chemo today. Will hold HD today    Review of patient's allergies indicates:   Allergen Reactions    Ampicillin     Peaches [peach (prunus persica)] Other (See Comments)     Pt unable to state type of reaction. Information obtained from daughter who states she was informed of allergy from patient.    Penicillins      Other reaction(s): Hives    Sulfa (sulfonamide antibiotics) Rash and Hives     Current Facility-Administered Medications   Medication Frequency    0.9%  NaCl infusion (for blood administration) Q24H PRN    0.9%  NaCl infusion (for blood administration) Q24H PRN    0.9%  NaCl infusion  (for blood administration) Q24H PRN    0.9%  NaCl infusion Once    acetaminophen tablet 650 mg Q4H PRN    albuterol-ipratropium 2.5 mg-0.5 mg/3 mL nebulizer solution 3 mL Q4H PRN    aluminum-magnesium hydroxide-simethicone 200-200-20 mg/5 mL suspension 30 mL QID PRN    amLODIPine tablet 10 mg Daily    atovaquone 750 mg/5 mL oral liquid 1,500 mg Daily    bisacodyL suppository 10 mg Daily PRN    carvediloL tablet 25 mg BID    cloNIDine tablet 0.1 mg Q6H PRN    dextrose 10% bolus 125 mL PRN    dextrose 10% bolus 250 mL PRN    glucagon (human recombinant) injection 1 mg PRN    glucose chewable tablet 16 g PRN    glucose chewable tablet 24 g PRN    heparin (porcine) injection 1,000 Units PRN    heparin (porcine) injection 1,000 Units PRN    hydrALAZINE tablet 100 mg Q8H    insulin aspart U-100 pen 1-10 Units Q6H PRN    levothyroxine tablet 25 mcg Before breakfast    lisinopriL tablet 40 mg Daily    melatonin tablet 6 mg Nightly PRN    methocarbamoL tablet 500 mg TID PRN    methylPREDNISolone sodium succinate injection 20 mg Daily    naloxone 0.4 mg/mL injection 0.02 mg PRN    ondansetron injection 4 mg Q8H PRN    pantoprazole injection 40 mg Daily    prochlorperazine injection Soln 5 mg Q6H PRN    QUEtiapine tablet 25 mg QHS    simethicone chewable tablet 80 mg QID PRN    sodium chloride 0.9% 250 mL flush bag 1 time in Clinic/HOD    sodium chloride 0.9% flush 10 mL PRN    sodium chloride 0.9% flush 10 mL PRN    sodium chloride 0.9% flush 5 mL PRN     Facility-Administered Medications Ordered in Other Encounters   Medication Frequency    celecoxib capsule 400 mg Once    fentaNYL 50 mcg/mL injection  mcg PRN    LIDOcaine (PF) 10 mg/ml (1%) injection 10 mg Once PRN    LIDOcaine (PF) 10 mg/ml (1%) injection 10 mg Once    midazolam (VERSED) 1 mg/mL injection 0.5-4 mg PRN    ropivacaine 0.2% [facility] PainPRO Pump infusion 500 ML Continuous       Objective:     Vital Signs (Most  Recent):  Temp: 97.9 °F (36.6 °C) (11/10/22 1150)  Pulse: 69 (11/10/22 1150)  Resp: 18 (11/10/22 1150)  BP: (!) 142/66 (11/10/22 0730)  SpO2: 100 % (11/10/22 1150)  O2 Device (Oxygen Therapy): room air (11/10/22 1150)   Vital Signs (24h Range):  Temp:  [97.9 °F (36.6 °C)-98.7 °F (37.1 °C)] 97.9 °F (36.6 °C)  Pulse:  [65-86] 69  Resp:  [13-18] 18  SpO2:  [96 %-100 %] 100 %  BP: (138-149)/(65-69) 142/66     Weight: 63 kg (138 lb 14.2 oz) (11/09/22 1000)  Body mass index is 26.24 kg/m².  Body surface area is 1.65 meters squared.    I/O last 3 completed shifts:  In: 1666.6 [NG/GT:1550; IV Piggyback:116.6]  Out: -     Physical Exam  Vitals and nursing note reviewed.   Constitutional:       General: She is not in acute distress.     Appearance: She is well-developed. She is ill-appearing. She is not toxic-appearing.      Comments: Patient awake and alert and in no distress   HENT:      Head: Normocephalic and atraumatic.      Mouth/Throat:      Mouth: Mucous membranes are dry.   Eyes:      Conjunctiva/sclera: Conjunctivae normal.   Cardiovascular:      Rate and Rhythm: Normal rate and regular rhythm.      Heart sounds: Normal heart sounds.   Pulmonary:      Effort: Pulmonary effort is normal. No respiratory distress.      Breath sounds: No wheezing or rales.      Comments: Coarse breath sounds bilaterally  Abdominal:      General: Bowel sounds are normal. There is no distension.      Palpations: Abdomen is soft.      Tenderness: There is no abdominal tenderness.   Musculoskeletal:         General: Swelling present. No tenderness. Normal range of motion.      Cervical back: Normal range of motion and neck supple.      Right lower leg: No edema.      Left lower leg: No edema.   Lymphadenopathy:      Cervical: No cervical adenopathy.   Skin:     General: Skin is warm and dry.      Capillary Refill: Capillary refill takes less than 2 seconds.      Findings: No rash.   Neurological:      General: No focal deficit present.       "Mental Status: She is alert and oriented to person, place, and time.       Significant Labs:  All labs within the past 24 hours have been reviewed.     Significant Imaging:  Labs: Reviewed    Assessment/Plan:     Encounter for continuous renal replacement therapy (CRRT) for acute renal failure  Patient with baseline creatinine of 1 as recent as August 2022 with progressive worsening beginning in early October 1.3 (10/13)->1.6 (10/17)->3.0 (10/23) despite IV fluids.  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  Patient noted to have new onset proteinuria and hematuria over the last 1 month.  Additionally, would consider ATN in the setting of suspected sepsis from multifocal pneumonia.      Concerns for glomerulonephritis given patient's current clinical picture. Initial urine microscopy demonstrating muddy brown casts with likely acanthocytes noted as well. MITUL positive, proteinase 3 ab weakly positive, MPO strongly positive. Patient s/p high-dose IV solumedrol x3 doses, now on Solumedrol 50mg qd. Underwent kidney bx 10/27. Rheumatology consulted, and patient started on Plex, Ritux/cytoxan. Given continually worsening renal function and uremic encephalopathy, patient underwent SLED without complication on 10/27. Transferred to floor on 10/29. Significantly improved mentation on 10/31. Underwent HD 11/1. Patient also with increasing hypernatremia so added FWF to tube feeds.      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 Second dose Ritux on 11/3. Next dose of cytoxan on 11/10. Will discuss with Rheum regarding maintenance dosing.      Recommendations:  - HD last on 11/8  - chemo 11/10, plan for HD 11/11  - continue steroid taper per rheum   - strict intake and output  - renally dose " medications and avoid nephrotoxins when possible  - replete electrolytes as appropriate        Thank you for your consult. I will follow-up with patient. Please contact us if you have any additional questions.    Blue Puente MD  Nephrology  [facility] ([place])

## 2022-11-10 NOTE — ASSESSMENT & PLAN NOTE
Multifocal pneumonia  Hemoptysis  Dyspnea  Diffuse Alveolar Hemorrahge  In the setting of a new diagnosis of microscopic polyangiitis, presented with fevers, dyspnea,.  - Chest imaging was consistent with diffuse alveolar hemorrhage.  CT chest wc 10/17 with bl perihilar dense consolidations w/ peripheral patcy areas of GGO, BL PNA, less c/f cardiogenic pulmonary edema.  - Patient upgraded to Medical ICU 10/23/22 with abrupt respiratory decline requiring NIPPV, improved with high dose IV steroids, plasmapheresis, emergent hemodialysis.   - Unable to perform bronchoscopy in the Medical ICU due to tenuous respiratory status  - As of 11/6, hypoxemia has significantly improved    Plan:  - weaned to room air  - continue management of underlying triggers with steroid and immunosuppressants  - incentive spirometry and respiratory hygiene

## 2022-11-10 NOTE — ASSESSMENT & PLAN NOTE
"Patient with baseline creatinine of 1 as recent as August 2022 with progressive worsening beginning in early October 1.3 (10/13)->1.6 (10/17)->3.0 (10/23) despite IV fluids.  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  Patient noted to have new onset proteinuria and hematuria over the last 1 month.  Additionally, would consider ATN in the setting of suspected sepsis from multifocal pneumonia.      Concerns for glomerulonephritis given patient's current clinical picture. Initial urine microscopy demonstrating muddy brown casts with likely acanthocytes noted as well. MITUL positive, proteinase 3 ab weakly positive, MPO strongly positive. Patient s/p high-dose IV solumedrol x3 doses, now on Solumedrol 50mg qd. Underwent kidney bx 10/27. Rheumatology consulted, and patient started on Plex, Ritux/cytoxan. Given continually worsening renal function and uremic encephalopathy, patient underwent SLED without complication on 10/27. Transferred to floor on 10/29. Significantly improved mentation on 10/31. Underwent HD 11/1. Patient also with increasing hypernatremia so added FWF to tube feeds.      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 Second dose Ritux on 11/3. Next dose of cytoxan on 11/10. Will discuss with Rheum regarding maintenance dosing.      Recommendations:  - HD last on 11/8  - chemo 11/10, plan for HD 11/11  - continue steroid taper per rheum   - strict intake and output  - renally dose medications and avoid nephrotoxins when possible  - replete electrolytes as appropriate  "

## 2022-11-10 NOTE — SUBJECTIVE & OBJECTIVE
Interval History:   Patient seen and examined at bedside.    No acute events overnight.  Patient notes belching, intermittent nausea, and bad taste at back of mouth this AM. She denies any abdo pain.       Review of Systems   Constitutional:  Positive for fatigue. Negative for chills, diaphoresis and fever.   HENT:  Positive for trouble swallowing. Negative for congestion and sore throat.    Eyes:  Negative for visual disturbance.   Respiratory:  Negative for cough, shortness of breath and wheezing.    Cardiovascular:  Negative for chest pain, palpitations and leg swelling.   Gastrointestinal:  Positive for nausea. Negative for abdominal pain and vomiting.   Genitourinary:  Positive for decreased urine volume.   Musculoskeletal:  Negative for arthralgias and myalgias.   Skin:  Negative for rash.   Neurological:  Positive for weakness. Negative for dizziness, light-headedness and headaches.   Psychiatric/Behavioral:  Negative for confusion, decreased concentration and sleep disturbance.      Objective:     Vital Signs (Most Recent):  Temp: 97.9 °F (36.6 °C) (11/10/22 1150)  Pulse: 69 (11/10/22 1150)  Resp: 18 (11/10/22 1150)  BP: (!) 142/66 (11/10/22 0730)  SpO2: 100 % (11/10/22 1150)   Vital Signs (24h Range):  Temp:  [97.9 °F (36.6 °C)-98.7 °F (37.1 °C)] 97.9 °F (36.6 °C)  Pulse:  [65-86] 69  Resp:  [13-18] 18  SpO2:  [96 %-100 %] 100 %  BP: (138-149)/(65-69) 142/66     Weight: 63 kg (138 lb 14.2 oz)  Body mass index is 26.24 kg/m².    Intake/Output Summary (Last 24 hours) at 11/10/2022 1621  Last data filed at 11/10/2022 0511  Gross per 24 hour   Intake 1150 ml   Output --   Net 1150 ml        Physical Exam  Vitals and nursing note reviewed.   Constitutional:       General: She is awake.      Appearance: She is well-developed. She is ill-appearing. She is not toxic-appearing or diaphoretic.   HENT:      Head: Normocephalic and atraumatic.      Right Ear: External ear normal.      Left Ear: External ear normal.       Nose:      Comments: + NGT     Mouth/Throat:      Mouth: Mucous membranes are dry.   Eyes:      General: Lids are normal. No scleral icterus.        Right eye: No discharge.         Left eye: No discharge.   Cardiovascular:      Rate and Rhythm: Normal rate and regular rhythm.   Pulmonary:      Effort: Pulmonary effort is normal.      Breath sounds: Normal breath sounds. No wheezing or rhonchi.   Abdominal:      General: Bowel sounds are normal.      Palpations: Abdomen is soft.      Tenderness: There is abdominal tenderness (epigastric).   Musculoskeletal:         General: No deformity.      Right lower leg: No edema.      Left lower leg: No edema.   Skin:     General: Skin is warm and dry.   Neurological:      General: No focal deficit present.      Mental Status: She is alert and oriented to person, place, and time. Mental status is at baseline.   Psychiatric:         Attention and Perception: Attention normal.         Behavior: Behavior normal.       Significant Labs: All pertinent labs within the past 24 hours have been reviewed.    Recent Results (from the past 24 hour(s))   POCT glucose    Collection Time: 11/09/22  5:28 PM   Result Value Ref Range    POCT Glucose 188 (H) 70 - 110 mg/dL   POCT glucose    Collection Time: 11/10/22  4:18 AM   Result Value Ref Range    POCT Glucose 135 (H) 70 - 110 mg/dL   POCT glucose    Collection Time: 11/10/22  5:34 AM   Result Value Ref Range    POCT Glucose 119 (H) 70 - 110 mg/dL   Comprehensive metabolic panel    Collection Time: 11/10/22  6:17 AM   Result Value Ref Range    Sodium 134 (L) 136 - 145 mmol/L    Potassium 4.0 3.5 - 5.1 mmol/L    Chloride 101 95 - 110 mmol/L    CO2 17 (L) 23 - 29 mmol/L    Glucose 120 (H) 70 - 110 mg/dL    BUN 72 (H) 8 - 23 mg/dL    Creatinine 4.7 (H) 0.5 - 1.4 mg/dL    Calcium 7.8 (L) 8.7 - 10.5 mg/dL    Total Protein 5.3 (L) 6.0 - 8.4 g/dL    Albumin 2.7 (L) 3.5 - 5.2 g/dL    Total Bilirubin 0.5 0.1 - 1.0 mg/dL    Alkaline Phosphatase 71  55 - 135 U/L    AST 20 10 - 40 U/L    ALT 23 10 - 44 U/L    Anion Gap 16 8 - 16 mmol/L    eGFR 9.2 (A) >60 mL/min/1.73 m^2   CBC auto differential    Collection Time: 11/10/22  6:17 AM   Result Value Ref Range    WBC 8.89 3.90 - 12.70 K/uL    RBC 2.91 (L) 4.00 - 5.40 M/uL    Hemoglobin 8.4 (L) 12.0 - 16.0 g/dL    Hematocrit 25.8 (L) 37.0 - 48.5 %    MCV 89 82 - 98 fL    MCH 28.9 27.0 - 31.0 pg    MCHC 32.6 32.0 - 36.0 g/dL    RDW 16.2 (H) 11.5 - 14.5 %    Platelets 141 (L) 150 - 450 K/uL    MPV 11.2 9.2 - 12.9 fL    Immature Granulocytes 0.3 0.0 - 0.5 %    Gran # (ANC) 7.6 1.8 - 7.7 K/uL    Immature Grans (Abs) 0.03 0.00 - 0.04 K/uL    Lymph # 0.8 (L) 1.0 - 4.8 K/uL    Mono # 0.5 0.3 - 1.0 K/uL    Eos # 0.0 0.0 - 0.5 K/uL    Baso # 0.00 0.00 - 0.20 K/uL    nRBC 0 0 /100 WBC    Gran % 85.6 (H) 38.0 - 73.0 %    Lymph % 8.7 (L) 18.0 - 48.0 %    Mono % 5.2 4.0 - 15.0 %    Eosinophil % 0.2 0.0 - 8.0 %    Basophil % 0.0 0.0 - 1.9 %    Differential Method Automated    Magnesium    Collection Time: 11/10/22  6:17 AM   Result Value Ref Range    Magnesium 1.8 1.6 - 2.6 mg/dL   Phosphorus    Collection Time: 11/10/22  6:17 AM   Result Value Ref Range    Phosphorus 5.5 (H) 2.7 - 4.5 mg/dL   POCT glucose    Collection Time: 11/10/22 11:58 AM   Result Value Ref Range    POCT Glucose 188 (H) 70 - 110 mg/dL         Significant Imaging: I have reviewed all pertinent imaging results/findings within the past 24 hours.    Imaging Results               CT Chest With Contrast (Final result)  Result time 10/17/22 14:26:36      Final result by Lacy Camp MD (10/17/22 14:26:36)                   Impression:      Interval progression of bilateral perihilar dense consolidation with peripheral patchy areas of ground-glass opacification nonspecific as to the etiology.  Bilateral pneumonia as well as inflammatory etiologies considered.  Cardiogenic pulmonary edema considered less likely given the pattern.    Borderline sized mediastinal  lymph node appears stable.    No other interval detrimental changes since 10/14/2022.    This report was flagged in Epic as abnormal.      Electronically signed by: Lacy Camp  Date:    10/17/2022  Time:    14:26               Narrative:    EXAMINATION:  CT CHEST WITH CONTRAST    CLINICAL HISTORY:  Aspiration;    TECHNIQUE:  Low dose axial images, sagittal and coronal reformations were obtained from the thoracic inlet to the lung bases following the IV administration of 75 mL of Omnipaque 350.    COMPARISON:  CT chest without contrast 10/14/2022, CTA chest 09/24/2022    FINDINGS:  Base of Neck: No significant abnormality.    Thoracic soft tissues: Unremarkable.    Aorta: Left-sided aortic arch.  No aneurysm and no significant atherosclerosis.    Heart: Normal in size.  There is no convincing pericardial effusion with prior trace effusion no longer apparent.    Pulmonary vasculature: Pulmonary arteries distribute normally with no visible significant central pulmonary thromboemboli.  Pulmonary artery caliber is normal.    Essence/Mediastinum: 9 mm in short axis lymph node in the AP window again noted.  No convincing new adenopathy.  Axillary shotty lymph nodes with fatty essence appear stable.    Airways: Patent.    Lungs/Pleura: Perihilar alveolar opacities with ground-glass component appear more prominent bilaterally as compared to the previous examination 10/14/2022 with dense consolidation centered in the perihilar region.  More patchy ground-glass opacities extend to the periphery.  There is no acute pleural effusion.    Esophagus: Unremarkable.    Upper Abdomen: Multiple low densities are again noted in the liver left lobe too small to characterize but likely cysts largest measuring 8 mm axial image 93 series 2.  There is no new abnormality of the partially imaged upper abdomen.    Bones: There is no acute fracture or suspicious lytic or sclerotic lesion involving the imaged chest wall osseous structures.  There  is spondylosis and kyphosis of the imaged spine.  No subluxation.

## 2022-11-10 NOTE — ASSESSMENT & PLAN NOTE
Starting having hemoptysis and melena with associated acute blood loss anemia earlier in hospital stay. Patient with known internal hemorrhoids, mild sigmoid diverticulosis, history of gastritis and + H pylori (2012 EGD).   - GI consulted 10/19, unable to perform EGD due to instability and respiratory failure at the time    Plan  - patient unable to take PO PPI due to NGT removal on 11/5, transition to IV PPI 40mg pantoprazole daily, return to per NG tube once replaced  -PPI transitioned to BID as concern for GERD  - Monitor CBC closely for signs of recurrent bleed

## 2022-11-10 NOTE — SUBJECTIVE & OBJECTIVE
Interval History: scheduled to get chemo today. Will hold HD today    Review of patient's allergies indicates:   Allergen Reactions    Ampicillin     Peaches [peach (prunus persica)] Other (See Comments)     Pt unable to state type of reaction. Information obtained from daughter who states she was informed of allergy from patient.    Penicillins      Other reaction(s): Hives    Sulfa (sulfonamide antibiotics) Rash and Hives     Current Facility-Administered Medications   Medication Frequency    0.9%  NaCl infusion (for blood administration) Q24H PRN    0.9%  NaCl infusion (for blood administration) Q24H PRN    0.9%  NaCl infusion (for blood administration) Q24H PRN    0.9%  NaCl infusion Once    acetaminophen tablet 650 mg Q4H PRN    albuterol-ipratropium 2.5 mg-0.5 mg/3 mL nebulizer solution 3 mL Q4H PRN    aluminum-magnesium hydroxide-simethicone 200-200-20 mg/5 mL suspension 30 mL QID PRN    amLODIPine tablet 10 mg Daily    atovaquone 750 mg/5 mL oral liquid 1,500 mg Daily    bisacodyL suppository 10 mg Daily PRN    carvediloL tablet 25 mg BID    cloNIDine tablet 0.1 mg Q6H PRN    dextrose 10% bolus 125 mL PRN    dextrose 10% bolus 250 mL PRN    glucagon (human recombinant) injection 1 mg PRN    glucose chewable tablet 16 g PRN    glucose chewable tablet 24 g PRN    heparin (porcine) injection 1,000 Units PRN    heparin (porcine) injection 1,000 Units PRN    hydrALAZINE tablet 100 mg Q8H    insulin aspart U-100 pen 1-10 Units Q6H PRN    levothyroxine tablet 25 mcg Before breakfast    lisinopriL tablet 40 mg Daily    melatonin tablet 6 mg Nightly PRN    methocarbamoL tablet 500 mg TID PRN    methylPREDNISolone sodium succinate injection 20 mg Daily    naloxone 0.4 mg/mL injection 0.02 mg PRN    ondansetron injection 4 mg Q8H PRN    pantoprazole injection 40 mg Daily    prochlorperazine injection Soln 5 mg Q6H PRN    QUEtiapine tablet 25 mg QHS    simethicone chewable tablet 80 mg QID PRN    sodium chloride 0.9%  250 mL flush bag 1 time in Clinic/HOD    sodium chloride 0.9% flush 10 mL PRN    sodium chloride 0.9% flush 10 mL PRN    sodium chloride 0.9% flush 5 mL PRN     Facility-Administered Medications Ordered in Other Encounters   Medication Frequency    celecoxib capsule 400 mg Once    fentaNYL 50 mcg/mL injection  mcg PRN    LIDOcaine (PF) 10 mg/ml (1%) injection 10 mg Once PRN    LIDOcaine (PF) 10 mg/ml (1%) injection 10 mg Once    midazolam (VERSED) 1 mg/mL injection 0.5-4 mg PRN    ropivacaine 0.2% [facility] PainPRO Pump infusion 500 ML Continuous       Objective:     Vital Signs (Most Recent):  Temp: 97.9 °F (36.6 °C) (11/10/22 1150)  Pulse: 69 (11/10/22 1150)  Resp: 18 (11/10/22 1150)  BP: (!) 142/66 (11/10/22 0730)  SpO2: 100 % (11/10/22 1150)  O2 Device (Oxygen Therapy): room air (11/10/22 1150)   Vital Signs (24h Range):  Temp:  [97.9 °F (36.6 °C)-98.7 °F (37.1 °C)] 97.9 °F (36.6 °C)  Pulse:  [65-86] 69  Resp:  [13-18] 18  SpO2:  [96 %-100 %] 100 %  BP: (138-149)/(65-69) 142/66     Weight: 63 kg (138 lb 14.2 oz) (11/09/22 1000)  Body mass index is 26.24 kg/m².  Body surface area is 1.65 meters squared.    I/O last 3 completed shifts:  In: 1666.6 [NG/GT:1550; IV Piggyback:116.6]  Out: -     Physical Exam  Vitals and nursing note reviewed.   Constitutional:       General: She is not in acute distress.     Appearance: She is well-developed. She is ill-appearing. She is not toxic-appearing.      Comments: Patient awake and alert and in no distress   HENT:      Head: Normocephalic and atraumatic.      Mouth/Throat:      Mouth: Mucous membranes are dry.   Eyes:      Conjunctiva/sclera: Conjunctivae normal.   Cardiovascular:      Rate and Rhythm: Normal rate and regular rhythm.      Heart sounds: Normal heart sounds.   Pulmonary:      Effort: Pulmonary effort is normal. No respiratory distress.      Breath sounds: No wheezing or rales.      Comments: Coarse breath sounds bilaterally  Abdominal:      General: Bowel  sounds are normal. There is no distension.      Palpations: Abdomen is soft.      Tenderness: There is no abdominal tenderness.   Musculoskeletal:         General: Swelling present. No tenderness. Normal range of motion.      Cervical back: Normal range of motion and neck supple.      Right lower leg: No edema.      Left lower leg: No edema.   Lymphadenopathy:      Cervical: No cervical adenopathy.   Skin:     General: Skin is warm and dry.      Capillary Refill: Capillary refill takes less than 2 seconds.      Findings: No rash.   Neurological:      General: No focal deficit present.      Mental Status: She is alert and oriented to person, place, and time.       Significant Labs:  All labs within the past 24 hours have been reviewed.     Significant Imaging:  Labs: Reviewed

## 2022-11-10 NOTE — NURSING
End of shift:    -RA  -VSS  -NADN  -Novasource renal 35ml/hr to Dob[place] in Right nare.  -BG-wnl, 135  -Bed in lowest position, call light in reach, will continue to monitor and report to on coming RN.     Vandana MayerRN

## 2022-11-10 NOTE — ASSESSMENT & PLAN NOTE
In the setting of GI bleed with melena  - Evaluated by GI, see GI bleed documentation  - Last transfusion 10/28, Hgb slowly trending down since then  - Hgb 6.9 on 11/5      Plan  - Monitor CBC Daily   - Treat with pRBC transfusion PRN Hgb <7  - qualifies for transfusion on 11/5 with Hgb 6.9, 1u pRBC ordered  -stable

## 2022-11-10 NOTE — ASSESSMENT & PLAN NOTE
BP Readings from Last 1 Encounters:   11/10/22 (!) 142/66     - Patient is unable to tolerate PO mediations, all meds to be administered via NG tube    amLODIPine tablet 10 mg, 10 mg, Per NG tube, Daily    carvediloL tablet 12.5 mg, 12.5 mg, Per NG tube, BID    hydrALAZINE tablet 25 mg, 25 mg, Per NG tube, Q8H    lisinopriL tablet 40 mg, 40 mg, Per NG tube, Daily

## 2022-11-10 NOTE — PROGRESS NOTES
"[facility] ([place] [address])  Rheumatology  Progress Note    Patient Name: Kristin Goodman  MRN: [id number]  Admission Date: 10/17/2022  Hospital Length of Stay: 25 days  Code Status: Full Code   Attending Provider: Danielle Palomino MD  Primary Care Physician: Lori Hernandez MD  Principal Problem: Microscopic polyangiitis    Subjective:     HPI: Patient is a 78 yo F with chronic diastolic heart failure, HTN, OA, who is admitted for respiratory failure. Rheumatology is consulted due to concern for vasculitis. Patient initially presented to the ED on 9/24/22 complaining of a week of cough followed by an episode of hemoptysis on 9/24 prompting the ED visit. They did a CTA which showed multifocal PNA. She was sent home with [facility]. She followed up with PCP on 10/4/22 and was feeling better. Then she presented to the ED again on 10/14 and on 10/17 complaining of dyspnea. She had fevers in the few days leading up to admission of 102. She endorsed weakness, productive cough, dyspnea, and loss of appetite. Pt initally at 88% O2 saturation and improved to 98% on 2L NC. She was admitted for IV antibiotics. CT chest with contrast on 10/17 showed evidence of interval progression of bilateral perihilar dense consolidation with peripheral patchy areas of ground-glass opacification nonspecific as to the etiology.  Bilateral pneumonia as well as inflammatory etiologies considered.  Cardiogenic pulmonary edema considered less likely given the pattern.    On 10/19/22 she started having melena. Hb dropped to 6. She got 2 units pRBCs. GI was consulted. They noted "Colonoscopy in 2020 showed internal hemorrhoids and mild sigmoid diverticulosis. EGD in 2012 showed gastritis, biopsies positive for H. Pylori." "We considered doing EGD now, but from a pulmonary standpoint I do not think she is stable enough with the current pneumonia, therefore would recommend that we just follow the patient, treat with a PPI in case there is " "any peptic ulcer disease."    On 10/21/22 ENT was consulted due to left sided otalgia the day prior which resolved. She also was complaining of cervical adenopathy and sore throat. They did not recommend surgical intervention and felt that adenopathy was likely reactive.     On 10/23/22 ID was consulted. They recommended checking respiratory infection panel and fungal markers.    On 10/23/22 Nephrology was consulted due to worsening creatinine. They noted, "Patient is a noted to have baseline creatinine of 1 as recent as 8/2022 but progressive worsening of creatinine in early October.  On admission patient with creatinine of 1.6 (10/17) with subsequent progression and creatinine of 3.0 at time of consultation (10/23).  Nephrology consulted for acute kidney injury." "Patient with ATN nephritic picture in urine sediment, prot/crea ratio pending. Had hematuria in recent past but in context with positive urine cultures. Now definitely more dysmorphic red cells that wbcs in the urine. 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    On 10/23/22, she was transferred to ICU due to desaturations and respiratory distress. Pulmonologist noted that her respiratory status is too tenuous for bronchoscopy. She was stabilized with non-invasive ventilation and nasal cannula oxygen.      No new subjective & objective note has been filed under this hospital service since the last note was generated.  Assessment/Plan:     Acute hypoxemic respiratory failure  Patient is a 76 yo F with chronic diastolic heart failure, HTN, OA, who is admitted for respiratory failure. Rheumatology is consulted due to concern for vasculitis. Patient presented with cough and hemoptysis since 9/24/22. She was admitted due to dyspnea and hypoxia on 10/17. She has had Hb drop to 6 this admission requiring blood transfusions. Reviewed her " chest imaging which shows progressive disease from 10/14 until now which can be concerning for DAH. This might also explain the Hb drop. No bronch planned for now due to her tenuous respiratory status. GI defers invasive workup for now because she is not stable enough. She has had increasing creatinine from baseline of 1 to 3.0 on 10/23/22. 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   3)  CHANGES SUGGESTIVE OF FOCAL ACUTE PYELONEPHRITIS.   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     Treatment to date  - s/p IV Solumedrol 1000 mg x3 doses. Now following PEXIVAS steroid taper. Currently on IV Solumedrol 20 mg daily.  - PLEX 10/26, 10/27, 10/29, 10/30, 11/1, 11/2, 11/3 (prior to Rituxan)  - Got SLED 10/27. Bedside HD 10/30  - Rituximab 375 mg/m^2 (600 mg) on 10/27 (every 7 day dose 1 of 4), 11/3 (dose 2 of 4), 11/10 (dose 3 of 4)  - Cyclophosphamide 750 mg/m2 on 10/27 (every 14 day dose 1 of 2), 11/10 (dose 2 of 2)    Recommendations:  Continue steroid taper per PEXIVAS trial as in the chart below.Continue start Solu-Medrol 20 mg IV started on 11/6 for total of 14 days  Atovaquone 1500 mg and Protonix daily while on high dose steroids.  Next Rituximab 375 mg/m^2 due today  Next cyclophosphamide 750 mg/m2 due today  Monitor CBC and CMP in 10-14 days after giving chemotherapy                  Danuta Neumann DO  Rheumatology  [facility] ([place]  [place]-[facility])

## 2022-11-10 NOTE — SUBJECTIVE & OBJECTIVE
Interval History: Feeling nauseous. HD cancelled for today. Plan to receive Cytoxan and Rituximab today    Current Facility-Administered Medications   Medication Frequency    0.9%  NaCl infusion (for blood administration) Q24H PRN    0.9%  NaCl infusion (for blood administration) Q24H PRN    0.9%  NaCl infusion (for blood administration) Q24H PRN    0.9%  NaCl infusion Once    acetaminophen tablet 650 mg Q4H PRN    albuterol-ipratropium 2.5 mg-0.5 mg/3 mL nebulizer solution 3 mL Q4H PRN    aluminum-magnesium hydroxide-simethicone 200-200-20 mg/5 mL suspension 30 mL QID PRN    amLODIPine tablet 10 mg Daily    atovaquone 750 mg/5 mL oral liquid 1,500 mg Daily    bisacodyL suppository 10 mg Daily PRN    carvediloL tablet 25 mg BID    cloNIDine tablet 0.1 mg Q6H PRN    dextrose 10% bolus 125 mL PRN    dextrose 10% bolus 250 mL PRN    glucagon (human recombinant) injection 1 mg PRN    glucose chewable tablet 16 g PRN    glucose chewable tablet 24 g PRN    heparin (porcine) injection 1,000 Units PRN    heparin (porcine) injection 1,000 Units PRN    hydrALAZINE tablet 100 mg Q8H    insulin aspart U-100 pen 1-10 Units Q6H PRN    levothyroxine tablet 25 mcg Before breakfast    lisinopriL tablet 40 mg Daily    melatonin tablet 6 mg Nightly PRN    methocarbamoL tablet 500 mg TID PRN    methylPREDNISolone sodium succinate injection 20 mg Daily    naloxone 0.4 mg/mL injection 0.02 mg PRN    ondansetron injection 4 mg Q8H PRN    pantoprazole injection 40 mg Daily    prochlorperazine injection Soln 5 mg Q6H PRN    QUEtiapine tablet 25 mg QHS    simethicone chewable tablet 80 mg QID PRN    sodium chloride 0.9% 250 mL flush bag 1 time in Clinic/HOD    sodium chloride 0.9% flush 10 mL PRN    sodium chloride 0.9% flush 10 mL PRN    sodium chloride 0.9% flush 5 mL PRN     Facility-Administered Medications Ordered in Other Encounters   Medication Frequency    celecoxib capsule 400 mg Once    fentaNYL 50 mcg/mL injection  mcg PRN     LIDOcaine (PF) 10 mg/ml (1%) injection 10 mg Once PRN    LIDOcaine (PF) 10 mg/ml (1%) injection 10 mg Once    midazolam (VERSED) 1 mg/mL injection 0.5-4 mg PRN    ropivacaine 0.2% [facility] PainPRO Pump infusion 500 ML Continuous     Objective:     Vital Signs (Most Recent):  Temp: 97.9 °F (36.6 °C) (11/10/22 1150)  Pulse: 69 (11/10/22 1150)  Resp: 18 (11/10/22 1150)  BP: (!) 142/66 (11/10/22 0730)  SpO2: 100 % (11/10/22 1150)  O2 Device (Oxygen Therapy): room air (11/10/22 1150)   Vital Signs (24h Range):  Temp:  [97.9 °F (36.6 °C)-98.7 °F (37.1 °C)] 97.9 °F (36.6 °C)  Pulse:  [65-86] 69  Resp:  [13-18] 18  SpO2:  [96 %-100 %] 100 %  BP: (138-149)/(65-69) 142/66     Weight: 63 kg (138 lb 14.2 oz) (11/09/22 1000)  Body mass index is 26.24 kg/m².  Body surface area is 1.65 meters squared.      Intake/Output Summary (Last 24 hours) at 11/10/2022 1341  Last data filed at 11/10/2022 0511  Gross per 24 hour   Intake 1150 ml   Output --   Net 1150 ml       Physical Exam   Constitutional: No distress.   HENT:   Head: Normocephalic and atraumatic.   Nose: No rhinorrhea or nasal congestion.   Mouth/Throat: Oropharynx is clear.   No tenderness to palpation of sinuses, nasal cartilage   Ears: No tenderness to palpation     Eyes: Pupils are equal, round, and reactive to light.   Cardiovascular: Normal rate and regular rhythm.   No murmur heard.  Pulmonary/Chest: Effort normal. No respiratory distress. She has no wheezes. She has. Rales: RLL.  Abdominal: Soft. Bowel sounds are normal. There is no abdominal tenderness.   Musculoskeletal:         General: No deformity. Normal range of motion.      Cervical back: Normal range of motion.   Neurological: She is alert.   Skin: Skin is warm and dry.   Psychiatric: Affect and judgment normal.     Significant Labs:  All pertinent lab results from the last 24 hours have been reviewed.    Significant Imaging:  Imaging results within the past 24 hours have been reviewed.

## 2022-11-10 NOTE — PLAN OF CARE
Patient not medically ready for DC. Will likely need out patient HD upon DC and will also need skilled placement. Medical team is clarifying need for HD and will let CM know. CM  talk with  patient am d family regarding placement and DC planning

## 2022-11-10 NOTE — PROGRESS NOTES
[facility] - Intensive Care ([place] [id number])  [facility] Medicine  Progress Note    Patient Name: Kristin Goodman  MRN: [id number]  Patient Class: IP- Inpatient   Admission Date: 10/17/2022  Length of Stay: 24 days  Attending Physician: Danielle Palomino MD  Primary Care Provider: Lori Hernandez MD        Subjective:     Principal Problem:Microscopic polyangiitis        HPI:  Kristin Goodman is a 77 y.o. female with a past medical history of HTN, hypothyroidism, HFpEF, and osteoarthritis of the arm who has presented to the ED for cough, SOB, and weakness. Daughter is present at the bedside. Patient presented to the ED on 9/24 with hemoptysis, CT and CXR showed high suspicion for multilobar pneumonia. Patient was discharged with a 10-day course of levofloxacin and an albuterol inhaler. Patient followed up with her PCP on 10/4 with slight improvement in symptoms and went back to work. Patient continued to have worsening symptoms and presented to the ED again on 10/14 for evaluation and no interventions were done. Patient endorses fevers over the last few days up to 102.4 F with progressive SOB. She endorses hemoptysis with moderate amount of blood, generalized weakness, productive cough, SOB, and loss of appetite. Denies chest pain, nausea, vomiting, abdominal pain, or urinary changes.    ED: hypertensive up to 219/93 and tachycardic up to 117. Oxygen saturation on 92% on RA, placed on 5L NC with sats >97%. CBC remarkable for leukocytosis of 16.03 and Hb 7.7. K 3.3. Cr 1.6, baseline ~1.0. . Troponin 0.061. COVID and flu negative. EKG NSR. CT chest with contrast pending at time of admission. Given home amlodipine and lisinopril. Given 500mL NS, IV azithromycin, cefepime and vanc.       Overview/Hospital Course:  Ms. Goodman was admitted to Hospital Medicine for management of multifocal pneumonia and acute hypoxemic respiratory failure after presenting to the ED with fevers, cough, hemoptysis, and dyspnea. She required  escalation to the Medical ICU due to abrupt decline in her respiratory status, associated with hemoptysis and requiring NIPPV. CT chest showed bilateral patchy ground glass opacities. Labs additionally were notable for acute renal failure on CKD3. Pulmonology was consulted while under the care of [facility] and felt this picture was consistent with diffuse alveolar hemorrhage.     Her workup revealed a strongly positive MPO Ab consistent with clinical picture of microscopic polyangiitis with pulmonary and renal involvement. She underwent Trialysis catheter placement 10/25/2022 in the Medical ICU. 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     Rheumatology was consulted, extensive workup revealed MITUL + 1:2560 homogenous, +dsDNA, normal complements, PR3 1.2 (slight +),  (+), cANCA + 1:80, pANCA neg, GBMAb neg, trace cryos. Due to concern for MPA, she was started on pulse dose IV methylprednisolone 1000mg for 3 days, and plasmapheresis under the guidance of Transfusion Medicine. She remains on a steroid taper and has completed PLEX. She additionally received rituximab and cyclophosphamide. OI prophylaxis was provided with atovaquone due to a sulfa allergy.     Nephrology was consulted for evaluation of acute renal failure in the setting of MPA. She did require SLED in the ICU for renal clearance and remains on intermittent hemodialysis. She is expected to continue HD once discharged.     Her acute hypoxemic respiratory failure improved and she was weaned off of supplemental oxygen. She stepped down to [facility] 10/28.     She underwent MBBS for evaluation of dysphagia, which showed global delayed initiation of swallow and aspiration with thin liquids. Due to  concern for possible prior stroke, head imaging was completed and negative for acute finding. She is NPO with NG tube. ENT was consulted for evaluation of dysphagia and cervical adenopathy.  Patient did not tolerate laryngoscopy; unable to visualize vocal cords but given her lack of hoarseness, they did not suspect vocal fold paresis/paralysis. MRI recommended by rheum to fully rule out any potential neurological cause of the patient's dysphagia; but demonstrated   remote left thalamic lacunar type infarct and punctate remote left cerebellar infarcts.  She is continuing to work with speech therapy.      She is due rituximab and cyclophosphamide on 11/10.     Her other chronic medical conditions including hypothyroidism, diastolic CHF, and hypertension were managed with her home medications, with dose adjustments as needed.         Interval History:   Patient seen and examined at bedside.    No acute events overnight.  Patient notes belching, intermittent nausea, and bad taste at back of mouth this AM. She denies any abdo pain.       Review of Systems   Constitutional:  Positive for fatigue. Negative for chills, diaphoresis and fever.   HENT:  Positive for trouble swallowing. Negative for congestion and sore throat.    Eyes:  Negative for visual disturbance.   Respiratory:  Negative for cough, shortness of breath and wheezing.    Cardiovascular:  Negative for chest pain, palpitations and leg swelling.   Gastrointestinal:  Positive for nausea. Negative for abdominal pain and vomiting.   Genitourinary:  Positive for decreased urine volume.   Musculoskeletal:  Negative for arthralgias and myalgias.   Skin:  Negative for rash.   Neurological:  Positive for weakness. Negative for dizziness, light-headedness and headaches.   Psychiatric/Behavioral:  Negative for confusion, decreased concentration and sleep disturbance.      Objective:     Vital Signs (Most Recent):  Temp: 97.9 °F (36.6 °C) (11/10/22 1150)  Pulse: 69  (11/10/22 1150)  Resp: 18 (11/10/22 1150)  BP: (!) 142/66 (11/10/22 0730)  SpO2: 100 % (11/10/22 1150)   Vital Signs (24h Range):  Temp:  [97.9 °F (36.6 °C)-98.7 °F (37.1 °C)] 97.9 °F (36.6 °C)  Pulse:  [65-86] 69  Resp:  [13-18] 18  SpO2:  [96 %-100 %] 100 %  BP: (138-149)/(65-69) 142/66     Weight: 63 kg (138 lb 14.2 oz)  Body mass index is 26.24 kg/m².    Intake/Output Summary (Last 24 hours) at 11/10/2022 1621  Last data filed at 11/10/2022 0511  Gross per 24 hour   Intake 1150 ml   Output --   Net 1150 ml        Physical Exam  Vitals and nursing note reviewed.   Constitutional:       General: She is awake.      Appearance: She is well-developed. She is ill-appearing. She is not toxic-appearing or diaphoretic.   HENT:      Head: Normocephalic and atraumatic.      Right Ear: External ear normal.      Left Ear: External ear normal.      Nose:      Comments: + NGT     Mouth/Throat:      Mouth: Mucous membranes are dry.   Eyes:      General: Lids are normal. No scleral icterus.        Right eye: No discharge.         Left eye: No discharge.   Cardiovascular:      Rate and Rhythm: Normal rate and regular rhythm.   Pulmonary:      Effort: Pulmonary effort is normal.      Breath sounds: Normal breath sounds. No wheezing or rhonchi.   Abdominal:      General: Bowel sounds are normal.      Palpations: Abdomen is soft.      Tenderness: There is abdominal tenderness (epigastric).   Musculoskeletal:         General: No deformity.      Right lower leg: No edema.      Left lower leg: No edema.   Skin:     General: Skin is warm and dry.   Neurological:      General: No focal deficit present.      Mental Status: She is alert and oriented to person, place, and time. Mental status is at baseline.   Psychiatric:         Attention and Perception: Attention normal.         Behavior: Behavior normal.       Significant Labs: All pertinent labs within the past 24 hours have been reviewed.    Recent Results (from the past 24 hour(s))    POCT glucose    Collection Time: 11/09/22  5:28 PM   Result Value Ref Range    POCT Glucose 188 (H) 70 - 110 mg/dL   POCT glucose    Collection Time: 11/10/22  4:18 AM   Result Value Ref Range    POCT Glucose 135 (H) 70 - 110 mg/dL   POCT glucose    Collection Time: 11/10/22  5:34 AM   Result Value Ref Range    POCT Glucose 119 (H) 70 - 110 mg/dL   Comprehensive metabolic panel    Collection Time: 11/10/22  6:17 AM   Result Value Ref Range    Sodium 134 (L) 136 - 145 mmol/L    Potassium 4.0 3.5 - 5.1 mmol/L    Chloride 101 95 - 110 mmol/L    CO2 17 (L) 23 - 29 mmol/L    Glucose 120 (H) 70 - 110 mg/dL    BUN 72 (H) 8 - 23 mg/dL    Creatinine 4.7 (H) 0.5 - 1.4 mg/dL    Calcium 7.8 (L) 8.7 - 10.5 mg/dL    Total Protein 5.3 (L) 6.0 - 8.4 g/dL    Albumin 2.7 (L) 3.5 - 5.2 g/dL    Total Bilirubin 0.5 0.1 - 1.0 mg/dL    Alkaline Phosphatase 71 55 - 135 U/L    AST 20 10 - 40 U/L    ALT 23 10 - 44 U/L    Anion Gap 16 8 - 16 mmol/L    eGFR 9.2 (A) >60 mL/min/1.73 m^2   CBC auto differential    Collection Time: 11/10/22  6:17 AM   Result Value Ref Range    WBC 8.89 3.90 - 12.70 K/uL    RBC 2.91 (L) 4.00 - 5.40 M/uL    Hemoglobin 8.4 (L) 12.0 - 16.0 g/dL    Hematocrit 25.8 (L) 37.0 - 48.5 %    MCV 89 82 - 98 fL    MCH 28.9 27.0 - 31.0 pg    MCHC 32.6 32.0 - 36.0 g/dL    RDW 16.2 (H) 11.5 - 14.5 %    Platelets 141 (L) 150 - 450 K/uL    MPV 11.2 9.2 - 12.9 fL    Immature Granulocytes 0.3 0.0 - 0.5 %    Gran # (ANC) 7.6 1.8 - 7.7 K/uL    Immature Grans (Abs) 0.03 0.00 - 0.04 K/uL    Lymph # 0.8 (L) 1.0 - 4.8 K/uL    Mono # 0.5 0.3 - 1.0 K/uL    Eos # 0.0 0.0 - 0.5 K/uL    Baso # 0.00 0.00 - 0.20 K/uL    nRBC 0 0 /100 WBC    Gran % 85.6 (H) 38.0 - 73.0 %    Lymph % 8.7 (L) 18.0 - 48.0 %    Mono % 5.2 4.0 - 15.0 %    Eosinophil % 0.2 0.0 - 8.0 %    Basophil % 0.0 0.0 - 1.9 %    Differential Method Automated    Magnesium    Collection Time: 11/10/22  6:17 AM   Result Value Ref Range    Magnesium 1.8 1.6 - 2.6 mg/dL   Phosphorus     Collection Time: 11/10/22  6:17 AM   Result Value Ref Range    Phosphorus 5.5 (H) 2.7 - 4.5 mg/dL   POCT glucose    Collection Time: 11/10/22 11:58 AM   Result Value Ref Range    POCT Glucose 188 (H) 70 - 110 mg/dL         Significant Imaging: I have reviewed all pertinent imaging results/findings within the past 24 hours.    Imaging Results               CT Chest With Contrast (Final result)  Result time 10/17/22 14:26:36      Final result by Lacy Camp MD (10/17/22 14:26:36)                   Impression:      Interval progression of bilateral perihilar dense consolidation with peripheral patchy areas of ground-glass opacification nonspecific as to the etiology.  Bilateral pneumonia as well as inflammatory etiologies considered.  Cardiogenic pulmonary edema considered less likely given the pattern.    Borderline sized mediastinal lymph node appears stable.    No other interval detrimental changes since 10/14/2022.    This report was flagged in Epic as abnormal.      Electronically signed by: Lacy Camp  Date:    10/17/2022  Time:    14:26               Narrative:    EXAMINATION:  CT CHEST WITH CONTRAST    CLINICAL HISTORY:  Aspiration;    TECHNIQUE:  Low dose axial images, sagittal and coronal reformations were obtained from the thoracic inlet to the lung bases following the IV administration of 75 mL of Omnipaque 350.    COMPARISON:  CT chest without contrast 10/14/2022, CTA chest 09/24/2022    FINDINGS:  Base of Neck: No significant abnormality.    Thoracic soft tissues: Unremarkable.    Aorta: Left-sided aortic arch.  No aneurysm and no significant atherosclerosis.    Heart: Normal in size.  There is no convincing pericardial effusion with prior trace effusion no longer apparent.    Pulmonary vasculature: Pulmonary arteries distribute normally with no visible significant central pulmonary thromboemboli.  Pulmonary artery caliber is normal.    Essence/Mediastinum: 9 mm in short axis lymph node in the  AP window again noted.  No convincing new adenopathy.  Axillary shotty lymph nodes with fatty von appear stable.    Airways: Patent.    Lungs/Pleura: Perihilar alveolar opacities with ground-glass component appear more prominent bilaterally as compared to the previous examination 10/14/2022 with dense consolidation centered in the perihilar region.  More patchy ground-glass opacities extend to the periphery.  There is no acute pleural effusion.    Esophagus: Unremarkable.    Upper Abdomen: Multiple low densities are again noted in the liver left lobe too small to characterize but likely cysts largest measuring 8 mm axial image 93 series 2.  There is no new abnormality of the partially imaged upper abdomen.    Bones: There is no acute fracture or suspicious lytic or sclerotic lesion involving the imaged chest wall osseous structures.  There is spondylosis and kyphosis of the imaged spine.  No subluxation.                                          Assessment/Plan:      * Microscopic polyangiitis  As of 10/26/22 strongly positive MPO Ab (in contrast to borderline positive PR3), consistent with clinical picture of microscopic polyangiitis with pulmonary, and renal involvement after presenting with acute hypoxemic respiratory failure, hemoptysis, and acute renal failure.  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   3)  CHANGES SUGGESTIVE OF FOCAL ACUTE PYELONEPHRITIS.   4)  MILD-TO-MODERATE ARTERIONEPHROSCLEROSIS   - Rheumatology consulted, appreciate evaluation and recommendations     - MITUL +  1:2560 homogenous, +dsDNA, normal complements     - PR3 1.2 (slight +),  (+)     - cANCA + 1:80, pANCA neg     - GBMAb neg, trace cryos  - Transfusion Medicine consulted for apheresis  - Nephrology consulted, see separate documentation for WILFREDO      Plan  - Steroids:    - completed IV methylprednisolone 1000mg x 3 days   - now on steroid taper as guided by Rheumatology   - currently methylprednisolone 20mg IV daily   - plan for 20mg IV daily on 11/6 for 14 days (last dose of 20mg equivalent is 11/20/2022).   - apheresis: underwent PLEX 10/26, 10/27, 10/30, 11/1, 11/2, 11/3  - rituximab every 7 days for 4 doses: Dose #1: 10/27, #2 11/3, next dose 11/10  - cyclophosphamide every 14 days for 2 doses: Dose #1 10/27, next dose 11/10  - Opportunistic Infection ppx: atovaquone 1500mg PO daily  - GI bleed prophylaxis: pantoprazole 40mg daily     Gastric reflux  GI cocktail  PPI BID  Elevated HOB; may need to re-consider continuous TF vs bouls      Gastrointestinal hemorrhage with melena  Starting having hemoptysis and melena with associated acute blood loss anemia earlier in hospital stay. Patient with known internal hemorrhoids, mild sigmoid diverticulosis, history of gastritis and + H pylori (2012 EGD).   - GI consulted 10/19, unable to perform EGD due to instability and respiratory failure at the time    Plan  - patient unable to take PO PPI due to NGT removal on 11/5, transition to IV PPI 40mg pantoprazole daily, return to per NG tube once replaced  -PPI transitioned to BID as concern for GERD  - Monitor CBC closely for signs of recurrent bleed          Dysphagia  SLP following  MBSS showing global weakness in swallowing as well as aspiration with thin liquids.   ENT consulted but patient did not tolerate laryngoscopy     Plan   - Discussed case with Rheumatology team, they are concerned that this dysphagia may have been from prior stroke, requesting imaging for evaluation-  CT demonstrated no acute findings or  causes for dysphagia NOR did MRI   - removed NGT and place dobhoff which is more comfortable and makes swallowing easier compared to NGT.    - continue working with speech therapy     Moderate malnutrition  Nutrition consulted. Most recent weight and BMI monitored-          Malnutrition (Moderate to Severe)  Weight Loss (Malnutrition): 7.5% in 3 months              Measurements:  Wt Readings from Last 1 Encounters:   11/09/22 63 kg (138 lb 14.2 oz)   Body mass index is 26.24 kg/m².    Recommendations: Recommendation/Intervention: 1. As tolerated, increase TF rate (of Novasource) to 35 mL/hr  - 2. RD to monitor & follow-up.  Goals: Meet % EEN, EPN by RD f/u date    Patient has been screened and assessed by RD. RD will follow patient.      Encounter for continuous renal replacement therapy (CRRT) for acute renal failure  Due to microscopic polyangiitis. Remains on hemodialysis intermittently.   - Baseline creatinine 1.0-1.2.   - New onset proteinuria/hematuria.   - Renal biopsy completed and   - Trialysis in place for intermittent Hemodialysis    Plan  - Nephrology consulted, appreciate management  - management of MPA as noted separately  - Renal function panel daily  - renally dose all medications  - intermittent Hemodialysis as indicated, per Nephrology     Acute hypoxemic respiratory failure  Multifocal pneumonia  Hemoptysis  Dyspnea  Diffuse Alveolar Hemorrahge  In the setting of a new diagnosis of microscopic polyangiitis, presented with fevers, dyspnea,.  - Chest imaging was consistent with diffuse alveolar hemorrhage.  CT chest wc 10/17 with bl perihilar dense consolidations w/ peripheral patcy areas of GGO, BL PNA, less c/f cardiogenic pulmonary edema.  - Patient upgraded to Medical ICU 10/23/22 with abrupt respiratory decline requiring NIPPV, improved with high dose IV steroids, plasmapheresis, emergent hemodialysis.   - Unable to perform bronchoscopy in the Medical ICU due to tenuous respiratory  status  - As of 11/6, hypoxemia has significantly improved    Plan:  - weaned to room air  - continue management of underlying triggers with steroid and immunosuppressants  - incentive spirometry and respiratory hygiene    Lymphadenopathy of head and neck  - Has bilateral neck gland swelling with tenderness,consulted ENT  - No surgical intervention indicated   - Adenopathy likely reactive in nature   - Recommend further workup if it does not resolve within next 2 weeks     Acute blood loss anemia  In the setting of GI bleed with melena  - Evaluated by GI, see GI bleed documentation  - Last transfusion 10/28, Hgb slowly trending down since then  - Hgb 6.9 on 11/5      Plan  - Monitor CBC Daily   - Treat with pRBC transfusion PRN Hgb <7  - qualifies for transfusion on 11/5 with Hgb 6.9, 1u pRBC ordered  -stable       Chronic diastolic heart failure  Pulmonary Hypertension due to left heart disease  TTE (10/2022) EF 65%, G1DD  Home meds: Lisinopril, Toprol     Plan  - Active volume control with intermittent Hemodialysis per Nephrology   - Daily weights (standing if tolerated)  - Strict I/Os  - Fluid restriction 1.5 day  - Cardiac diet w/ 2 g Na restriction      Primary hypertension  BP Readings from Last 1 Encounters:   11/10/22 (!) 142/66     - Patient is unable to tolerate PO mediations, all meds to be administered via NG tube    amLODIPine tablet 10 mg, 10 mg, Per NG tube, Daily    carvediloL tablet 12.5 mg, 12.5 mg, Per NG tube, BID    hydrALAZINE tablet 25 mg, 25 mg, Per NG tube, Q8H    lisinopriL tablet 40 mg, 40 mg, Per NG tube, Daily      Hypothyroid  - Continue synthroid 25 mcg  - TSH 0.585 10/27/22      VTE Risk Mitigation (From admission, onward)         Ordered     heparin, porcine (PF) 100 unit/mL injection flush 500 Units  As needed (PRN)         11/10/22 1430     heparin (porcine) injection 1,000 Units  As needed (PRN)         11/09/22 1601     heparin (porcine) injection 1,000 Units  As needed (PRN)          11/08/22 0854     Place sequential compression device  Until discontinued         10/25/22 1731     IP VTE HIGH RISK PATIENT  Once         10/17/22 1354     Reason for No Pharmacological VTE Prophylaxis  Once        Question:  Reasons:  Answer:  Risk of Bleeding    10/17/22 1354                Discharge Planning   ANTHONY: 11/15/2022     Code Status: Full Code   Is the patient medically ready for discharge?: No    Reason for patient still in hospital (select all that apply): Patient trending condition, Consult recommendations and Pending disposition  Discharge Plan A: Home with family                  Danielle Palomino MD  Department of Hospital Medicine   [facility] - Intensive Care ([place]-[facility])

## 2022-11-10 NOTE — PT/OT/SLP PROGRESS
"Speech Language Pathology Treatment    Patient Name:  Kristin Goodman   MRN:  [id number]  Admitting Diagnosis: Microscopic polyangiitis    Recommendations:                 General Recommendations:  Dysphagia therapy  Diet recommendations:  Continue alternative means of nutrition/hydration, consider Pleasure Feeding of small amounts (4-5 tsp sized bites/sips) of puree textures, thin liquid for comfort. Liquid Diet Level: NPO   Aspiration Precautions: Only when vital signs stable, Eliminate distractions, Feed only when awake/alert, Frequent oral care, HOB to 90 degrees, 1:1 Supervision & close monitoring for s/s of aspiration, Small bites/sips, and Strict aspiration precautions   General Precautions: Standard, aspiration, fall  Communication strategies:  provide increased time to answer and go to room if call light pushed    Subjective     Pt fatigued. RN cleared pt prior to treatment, he stated pt was tired today.  "I'm just so tired today.       Pain/Comfort:  Pain Rating 1: 0/10    Respiratory Status: Room air    Objective:     Has the patient been evaluated by SLP for swallowing?   Yes  Keep patient NPO? Yes     Pt found sleeping in chair next to bed awaiting dialysis. She reported she was tired and was waiting for family multiple times t/o the session. She denied consuming any pleasure feeds today and denied any pain when swallowing. SLP attempted to provide PO trials x3 however, pt declined due to fatigue. SLP provided education on the need for ongoing assessment of swallow to upgrade diet and SLP POC. No additional questions. Call light in reach upon SLP exit. RN informed that pt declined PO trials.     Assessment:     Kristin Goodman is a 77 y.o. female with an SLP diagnosis of Dysphagia.      Goals:   Multidisciplinary Problems       SLP Goals          Problem: SLP    Goal Priority Disciplines Outcome   SLP Goal     SLP Ongoing, Progressing   Description: Speech Language Pathology Goals  Goals expected to be met " by 11/14/22    1. Pt will participate in ongoing assessment of swallow function to determine safest, least restrictive means of nutrition/hydration  2. Educate Pt and family on aspiration precautions and SLP POC  3. Pt will tolerate trials of nectar-thickened liquids w/o overt S/S aspiration, MIN A  4. Pt will complete dysphagia exercises to improve timing of initiation of swallow x5 with 90% accuracy, MIN A                         Plan:     Patient to be seen:  4 x/week   Plan of Care expires:  11/29/22  Plan of Care reviewed with:  patient   SLP Follow-Up:  Yes       Discharge recommendations:  nursing facility, skilled   Barriers to Discharge:  None    Time Tracking:     SLP Treatment Date:   11/10/22  Speech Start Time:  1053  Speech Stop Time:  1107     Speech Total Time (min):  14 min    Billable Minutes: Self Care/Home Management Training 14    11/10/2022

## 2022-11-10 NOTE — ASSESSMENT & PLAN NOTE
Patient is a 76 yo F with chronic diastolic heart failure, HTN, OA, who is admitted for respiratory failure. Rheumatology is consulted due to concern for vasculitis. Patient presented with cough and hemoptysis since 9/24/22. She was admitted due to dyspnea and hypoxia on 10/17. She has had Hb drop to 6 this admission requiring blood transfusions. Reviewed her chest imaging which shows progressive disease from 10/14 until now which can be concerning for DAH. This might also explain the Hb drop. No bronch planned for now due to her tenuous respiratory status. GI defers invasive workup for now because she is not stable enough. She has had increasing creatinine from baseline of 1 to 3.0 on 10/23/22. 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   3)  CHANGES SUGGESTIVE OF FOCAL ACUTE PYELONEPHRITIS.   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     Treatment to date  - s/p IV Solumedrol 1000 mg x3 doses. Now following PEXIVAS steroid taper. Currently on IV Solumedrol 20 mg daily.  - PLEX 10/26, 10/27, 10/29, 10/30, 11/1, 11/2, 11/3 (prior to Rituxan)  - Got SLED 10/27. Bedside HD 10/30  - Rituximab 375 mg/m^2 (600 mg) on 10/27 (every 7 day dose 1 of 4), 11/3 (dose 2 of 4), 11/10 (dose 3 of 4)  - Cyclophosphamide 750 mg/m2 on 10/27 (every 14 day dose 1 of 2), 11/10 (dose 2 of 2)    Recommendations:  1. Continue steroid taper per PEXIVAS trial as in the chart  below.Continue start Solu-Medrol 20 mg IV started on 11/6 for total of 14 days  2. Atovaquone 1500 mg and Protonix daily while on high dose steroids.  3. Next Rituximab 375 mg/m^2 due today  4. Next cyclophosphamide 750 mg/m2 due today  5. Monitor CBC and CMP in 10-14 days after giving chemotherapy  6. Recommend MRI of brain given her persistent dysphagia

## 2022-11-11 LAB
ALBUMIN SERPL BCP-MCNC: 2.5 G/DL (ref 3.5–5.2)
ALP SERPL-CCNC: 60 U/L (ref 55–135)
ALT SERPL W/O P-5'-P-CCNC: 20 U/L (ref 10–44)
ANION GAP SERPL CALC-SCNC: 15 MMOL/L (ref 8–16)
AST SERPL-CCNC: 16 U/L (ref 10–40)
BACTERIA BLD CULT: NORMAL
BACTERIA BLD CULT: NORMAL
BASOPHILS # BLD AUTO: 0 K/UL (ref 0–0.2)
BASOPHILS NFR BLD: 0 % (ref 0–1.9)
BILIRUB SERPL-MCNC: 0.5 MG/DL (ref 0.1–1)
BUN SERPL-MCNC: 84 MG/DL (ref 8–23)
CALCIUM SERPL-MCNC: 7.7 MG/DL (ref 8.7–10.5)
CARDIOLIPIN IGG SER IA-ACNC: <9.4 GPL (ref 0–14.99)
CARDIOLIPIN IGM SER IA-ACNC: <9.4 MPL (ref 0–12.49)
CHLORIDE SERPL-SCNC: 105 MMOL/L (ref 95–110)
CO2 SERPL-SCNC: 15 MMOL/L (ref 23–29)
CREAT SERPL-MCNC: 5.1 MG/DL (ref 0.5–1.4)
DIFFERENTIAL METHOD: ABNORMAL
EOSINOPHIL # BLD AUTO: 0 K/UL (ref 0–0.5)
EOSINOPHIL NFR BLD: 0 % (ref 0–8)
ERYTHROCYTE [DISTWIDTH] IN BLOOD BY AUTOMATED COUNT: 16.2 % (ref 11.5–14.5)
EST. GFR  (NO RACE VARIABLE): 8.3 ML/MIN/1.73 M^2
GLUCOSE SERPL-MCNC: 131 MG/DL (ref 70–110)
HCT VFR BLD AUTO: 24.2 % (ref 37–48.5)
HGB BLD-MCNC: 7.7 G/DL (ref 12–16)
IMM GRANULOCYTES # BLD AUTO: 0.02 K/UL (ref 0–0.04)
IMM GRANULOCYTES NFR BLD AUTO: 0.3 % (ref 0–0.5)
LYMPHOCYTES # BLD AUTO: 0.5 K/UL (ref 1–4.8)
LYMPHOCYTES NFR BLD: 9 % (ref 18–48)
MAGNESIUM SERPL-MCNC: 1.9 MG/DL (ref 1.6–2.6)
MCH RBC QN AUTO: 28.8 PG (ref 27–31)
MCHC RBC AUTO-ENTMCNC: 31.8 G/DL (ref 32–36)
MCV RBC AUTO: 91 FL (ref 82–98)
MONOCYTES # BLD AUTO: 0.2 K/UL (ref 0.3–1)
MONOCYTES NFR BLD: 2.7 % (ref 4–15)
NEUTROPHILS # BLD AUTO: 5.2 K/UL (ref 1.8–7.7)
NEUTROPHILS NFR BLD: 88 % (ref 38–73)
NRBC BLD-RTO: 0 /100 WBC
PHOSPHATE SERPL-MCNC: 6.7 MG/DL (ref 2.7–4.5)
PLATELET # BLD AUTO: 126 K/UL (ref 150–450)
PMV BLD AUTO: 11.8 FL (ref 9.2–12.9)
POCT GLUCOSE: 122 MG/DL (ref 70–110)
POCT GLUCOSE: 148 MG/DL (ref 70–110)
POCT GLUCOSE: 151 MG/DL (ref 70–110)
POCT GLUCOSE: 162 MG/DL (ref 70–110)
POCT GLUCOSE: 178 MG/DL (ref 70–110)
POTASSIUM SERPL-SCNC: 4.7 MMOL/L (ref 3.5–5.1)
PROT SERPL-MCNC: 5 G/DL (ref 6–8.4)
RBC # BLD AUTO: 2.67 M/UL (ref 4–5.4)
SODIUM SERPL-SCNC: 135 MMOL/L (ref 136–145)
WBC # BLD AUTO: 5.88 K/UL (ref 3.9–12.7)

## 2022-11-11 PROCEDURE — 94761 N-INVAS EAR/PLS OXIMETRY MLT: CPT

## 2022-11-11 PROCEDURE — 25000003 PHARM REV CODE 250: Performed by: STUDENT IN AN ORGANIZED HEALTH CARE EDUCATION/TRAINING PROGRAM

## 2022-11-11 PROCEDURE — 36415 COLL VENOUS BLD VENIPUNCTURE: CPT

## 2022-11-11 PROCEDURE — 84100 ASSAY OF PHOSPHORUS: CPT

## 2022-11-11 PROCEDURE — 97110 THERAPEUTIC EXERCISES: CPT | Mod: CO

## 2022-11-11 PROCEDURE — 63600175 PHARM REV CODE 636 W HCPCS: Performed by: INTERNAL MEDICINE

## 2022-11-11 PROCEDURE — 25000003 PHARM REV CODE 250: Performed by: HOSPITALIST

## 2022-11-11 PROCEDURE — 99233 PR SUBSEQUENT HOSPITAL CARE,LEVL III: ICD-10-PCS | Mod: ,,, | Performed by: STUDENT IN AN ORGANIZED HEALTH CARE EDUCATION/TRAINING PROGRAM

## 2022-11-11 PROCEDURE — 80053 COMPREHEN METABOLIC PANEL: CPT

## 2022-11-11 PROCEDURE — 99232 SBSQ HOSP IP/OBS MODERATE 35: CPT | Mod: ,,, | Performed by: HOSPITALIST

## 2022-11-11 PROCEDURE — 99232 PR SUBSEQUENT HOSPITAL CARE,LEVL II: ICD-10-PCS | Mod: ,,, | Performed by: HOSPITALIST

## 2022-11-11 PROCEDURE — 97535 SELF CARE MNGMENT TRAINING: CPT

## 2022-11-11 PROCEDURE — 83735 ASSAY OF MAGNESIUM: CPT

## 2022-11-11 PROCEDURE — 25000003 PHARM REV CODE 250: Performed by: INTERNAL MEDICINE

## 2022-11-11 PROCEDURE — 97535 SELF CARE MNGMENT TRAINING: CPT | Mod: CO

## 2022-11-11 PROCEDURE — 63600175 PHARM REV CODE 636 W HCPCS: Performed by: STUDENT IN AN ORGANIZED HEALTH CARE EDUCATION/TRAINING PROGRAM

## 2022-11-11 PROCEDURE — 99233 SBSQ HOSP IP/OBS HIGH 50: CPT | Mod: 95,,, | Performed by: INTERNAL MEDICINE

## 2022-11-11 PROCEDURE — 99222 PR INITIAL HOSPITAL CARE,LEVL II: ICD-10-PCS | Mod: GC,,, | Performed by: INTERNAL MEDICINE

## 2022-11-11 PROCEDURE — 25000003 PHARM REV CODE 250

## 2022-11-11 PROCEDURE — 99233 PR SUBSEQUENT HOSPITAL CARE,LEVL III: ICD-10-PCS | Mod: 95,,, | Performed by: INTERNAL MEDICINE

## 2022-11-11 PROCEDURE — 94660 CPAP INITIATION&MGMT: CPT

## 2022-11-11 PROCEDURE — 99900035 HC TECH TIME PER 15 MIN (STAT)

## 2022-11-11 PROCEDURE — 99222 1ST HOSP IP/OBS MODERATE 55: CPT | Mod: GC,,, | Performed by: INTERNAL MEDICINE

## 2022-11-11 PROCEDURE — 90935 HEMODIALYSIS ONE EVALUATION: CPT

## 2022-11-11 PROCEDURE — 85025 COMPLETE CBC W/AUTO DIFF WBC: CPT

## 2022-11-11 PROCEDURE — 92526 ORAL FUNCTION THERAPY: CPT

## 2022-11-11 PROCEDURE — 99233 SBSQ HOSP IP/OBS HIGH 50: CPT | Mod: ,,, | Performed by: STUDENT IN AN ORGANIZED HEALTH CARE EDUCATION/TRAINING PROGRAM

## 2022-11-11 PROCEDURE — C9113 INJ PANTOPRAZOLE SODIUM, VIA: HCPCS | Performed by: STUDENT IN AN ORGANIZED HEALTH CARE EDUCATION/TRAINING PROGRAM

## 2022-11-11 PROCEDURE — 20600001 HC STEP DOWN PRIVATE ROOM

## 2022-11-11 RX ORDER — SODIUM CHLORIDE 9 MG/ML
INJECTION, SOLUTION INTRAVENOUS ONCE
Status: COMPLETED | OUTPATIENT
Start: 2022-11-11 | End: 2022-11-11

## 2022-11-11 RX ORDER — NYSTATIN 100000 [USP'U]/ML
500000 SUSPENSION ORAL 4 TIMES DAILY
Status: DISCONTINUED | OUTPATIENT
Start: 2022-11-11 | End: 2022-11-28

## 2022-11-11 RX ADMIN — ATOVAQUONE 1500 MG: 750 SUSPENSION ORAL at 08:11

## 2022-11-11 RX ADMIN — LISINOPRIL 40 MG: 20 TABLET ORAL at 08:11

## 2022-11-11 RX ADMIN — METHYLPREDNISOLONE SODIUM SUCCINATE 20 MG: 40 INJECTION, POWDER, FOR SOLUTION INTRAMUSCULAR; INTRAVENOUS at 08:11

## 2022-11-11 RX ADMIN — CARVEDILOL 25 MG: 25 TABLET, FILM COATED ORAL at 09:11

## 2022-11-11 RX ADMIN — MESNA 96 MG: 100 INJECTION, SOLUTION INTRAVENOUS at 03:11

## 2022-11-11 RX ADMIN — HEPARIN SODIUM 1000 UNITS: 1000 INJECTION, SOLUTION INTRAVENOUS; SUBCUTANEOUS at 04:11

## 2022-11-11 RX ADMIN — NYSTATIN 500000 UNITS: 500000 SUSPENSION ORAL at 06:11

## 2022-11-11 RX ADMIN — NYSTATIN 500000 UNITS: 500000 SUSPENSION ORAL at 09:11

## 2022-11-11 RX ADMIN — SODIUM CHLORIDE: 0.9 INJECTION, SOLUTION INTRAVENOUS at 01:11

## 2022-11-11 RX ADMIN — LEVOTHYROXINE SODIUM 25 MCG: 25 TABLET ORAL at 05:11

## 2022-11-11 RX ADMIN — CARVEDILOL 25 MG: 25 TABLET, FILM COATED ORAL at 08:11

## 2022-11-11 RX ADMIN — PANTOPRAZOLE SODIUM 40 MG: 40 INJECTION, POWDER, FOR SOLUTION INTRAVENOUS at 09:11

## 2022-11-11 RX ADMIN — ONDANSETRON 4 MG: 2 INJECTION INTRAMUSCULAR; INTRAVENOUS at 02:11

## 2022-11-11 RX ADMIN — QUETIAPINE FUMARATE 25 MG: 25 TABLET ORAL at 09:11

## 2022-11-11 RX ADMIN — PANTOPRAZOLE SODIUM 40 MG: 40 INJECTION, POWDER, FOR SOLUTION INTRAVENOUS at 08:11

## 2022-11-11 RX ADMIN — AMLODIPINE BESYLATE 10 MG: 10 TABLET ORAL at 08:11

## 2022-11-11 RX ADMIN — HYDRALAZINE HYDROCHLORIDE 100 MG: 50 TABLET ORAL at 05:11

## 2022-11-11 RX ADMIN — Medication 6 MG: at 09:11

## 2022-11-11 NOTE — NURSING
Chemotherapy - checked by chemotherapy certified nurses -consent for chemotherapy in chart- infused through patients midline - Chemotherapy started following hospital policy/procedure without difficulty.

## 2022-11-11 NOTE — PROGRESS NOTES
Patient arrived in a wheelchair to dialysis unit.   Report received as documented in PER Handoff on Doc Flowsheet.  VS's per Doc Flowsheet.    Acute Hemodialysis initiated using the following:    Dialysis Access: Left trialysis catheter    Tube feeds running at 20 ml/hr.    Will Maintain telemetry and blood pressure monitoring throughout treatment.  Refer to flowsheet and MAR for details.

## 2022-11-11 NOTE — PT/OT/SLP PROGRESS
"Speech Language Pathology Treatment    Patient Name:  Kristin Goodman   MRN:  [id number]  Admitting Diagnosis: Microscopic polyangiitis    Recommendations:                 General Recommendations:  Dysphagia therapy  Diet recommendations: Continue temporary, alternative means nutrition/hydration to ensure adequate nutrition/hydration, consider Pleasure Feeding of small amounts (4 -5 teaspoon sized bites/sips) of puree textures, NECTAR-thickened liquids for comfort provided strict precautions and good oral care  Aspiration Precautions:  Only when vital signs stable, only when upright, 1:1 assistance and close monitoring for S/S aspiration, 1 bite/sip at a time, Eliminate distractions, Feed only when awake/alert, Frequent oral care, No straws, Small bites/sips, and Strict aspiration precautions   Continue to monitor for signs and symptoms of aspiration and discontinue oral feeding should you notice any of the following: watery eyes, reddened facial area, wet vocal quality, increased work of breathing, change in respiratory status, increased congestion, coughing, fever and/or change in level of alertness  General Precautions: Standard, aspiration, fall  Communication strategies:  provide increased time to answer and go to room if call light pushed    Subjective     SLP reviewed Pt with MD pre/post session  SLP reviewed Pt with RN pre/post session  Pt presents calm  She explains, "Its not so much the swallow its like there is something near my ear" re: neck pain     Pain/Comfort:  Pain Rating 1: 5/10  Location - Side 1: Left  Location - Orientation 1: generalized  Location 1: neck  Pain Addressed 1: Distraction, Nurse notified, Other (see comments) (MD notified)  Pain Rating Post-Intervention 1: 5/10    Respiratory Status: Room air    Objective:     Has the patient been evaluated by SLP for swallowing?   Yes  Keep patient NPO? No   Current Respiratory Status:    room air     Pt found awake, alert and upright in chair in " room with NG in place. Her sibling was in the room with her t/o the session. Pt with clear vocal quality and variable intensity. She explained she noticed she had a cough night prior and endorsed L neck pain this service day. She denied difficulty with ice chips consumed for comfort earlier service day. SLP noted apt with mild white coating of lingual surface upon review of the oral mechanism. She was seen with trials of thin liquids (ice chips x3, cup sip x1, tsp sip x4, tsp bite (jello) x 4), nectar-thickened liquids (tsp sip x2.)   She demonstrated mild oral holding with inconsistent, at times moderately delayed, initiation of swallow and grimaced when swallowing this service day. Pt with overt coughing/choking on cup sip thin liquids x1. No overt S/S aspiration with teaspoon sized presentations of thin liquids or nectar-thickened liquids accepted.  Pt denied difficulty with swallowing t/o session and endorsed she was holding trials in oral cavity 2/2 neck pain.  SLP educated Pt on safety precautions, aspiration precautions, S/S aspiration, swallow anatomy and need for timely initiation of swallow for safety, and ongoing SLP POC. SLP further explained safe swallow strategies and role of pleasure feeds to improve swallow function and efficiency with ongoing goal of increasing oral intake and reducing reliance on alternative means nutrition. Pt verbalized partial understanding of education provided.  Sibling verbalized understanding. No additional questions. RN and MD notified of findings and recommendations.     Assessment:     Kristin Goodman is a 77 y.o. female with an SLP diagnosis of Dysphagia.  She presents with risk of aspiration due to decreased endurance secondary to pain.  Patient with minimal PO acceptance this service day 2/2 c/o L neck pain. Strict aspiration precautions required for safety.        Goals:   Multidisciplinary Problems       SLP Goals          Problem: SLP    Goal Priority Disciplines  Outcome   SLP Goal     SLP Ongoing, Progressing   Description: Speech Language Pathology Goals  Goals expected to be met by 11/14/22    1. Pt will participate in ongoing assessment of swallow function to determine safest, least restrictive means of nutrition/hydration  2. Educate Pt and family on aspiration precautions and SLP POC  3. Pt will tolerate trials of nectar-thickened liquids w/o overt S/S aspiration, MIN A  4. Pt will complete dysphagia exercises to improve timing of initiation of swallow x5 with 90% accuracy, MIN A                         Plan:     Patient to be seen:  4 x/week   Plan of Care expires:  11/29/22  Plan of Care reviewed with:  patient, sibling   SLP Follow-Up:  Yes       Discharge recommendations:  nursing facility, skilled       Time Tracking:     SLP Treatment Date:   11/11/22  Speech Start Time:  1146  Speech Stop Time:  1208     Speech Total Time (min):  22 min    Billable Minutes: Treatment Swallowing Dysfunction 12 and Self Care/Home Management Training 10    11/11/2022

## 2022-11-11 NOTE — ASSESSMENT & PLAN NOTE
"Patient with baseline creatinine of 1 as recent as August 2022 with progressive worsening beginning in early October 1.3 (10/13)->1.6 (10/17)->3.0 (10/23) despite IV fluids.  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  Patient noted to have new onset proteinuria and hematuria over the last 1 month.  Additionally, would consider ATN in the setting of suspected sepsis from multifocal pneumonia.      Concerns for glomerulonephritis given patient's current clinical picture. Initial urine microscopy demonstrating muddy brown casts with likely acanthocytes noted as well. MITUL positive, proteinase 3 ab weakly positive, MPO strongly positive. Patient s/p high-dose IV solumedrol x3 doses, now on Solumedrol 50mg qd. Underwent kidney bx 10/27. Rheumatology consulted, and patient started on Plex, Ritux/cytoxan. Given continually worsening renal function and uremic encephalopathy, patient underwent SLED without complication on 10/27. Transferred to floor on 10/29. Significantly improved mentation on 10/31. Underwent HD 11/1. Patient also with increasing hypernatremia so added FWF to tube feeds.      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 Second dose Ritux on 11/3. Next dose of cytoxan on 11/10. Will discuss with Rheum regarding maintenance dosing.      Recommendations:  - Reported return of urine output, however no recorded   Hold HD and do not place permacath at this time. Hopeful for renal recovery  - continue solumedrol 20 mg qd until 11/20, then 15 for 14 days, then 12.5 for 14 days, then 10 for 14 days, then 7.5 for 14 days then possibly life long 5 mg. (per PEXIVAS trial)  - strict intake and output  - renally dose medications and avoid nephrotoxins when possible  - replete electrolytes " as appropriate
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
## 2022-11-11 NOTE — ASSESSMENT & PLAN NOTE
As of 10/26/22 strongly positive MPO Ab (in contrast to borderline positive PR3), consistent with clinical picture of microscopic polyangiitis with pulmonary, and renal involvement after presenting with acute hypoxemic respiratory failure, hemoptysis, and acute renal failure.  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   3)  CHANGES SUGGESTIVE OF FOCAL ACUTE PYELONEPHRITIS.   4)  MILD-TO-MODERATE ARTERIONEPHROSCLEROSIS   - Rheumatology consulted, appreciate evaluation and recommendations     - MITUL + 1:2560 homogenous, +dsDNA, normal complements     - PR3 1.2 (slight +),  (+)     - cANCA + 1:80, pANCA neg     - GBMAb neg, trace cryos  - Transfusion Medicine consulted for apheresis  - Nephrology consulted, see separate documentation for WILFREDO      Plan  - Steroids:    - completed IV methylprednisolone 1000mg x 3 days   - now on steroid taper as guided by Rheumatology   - currently methylprednisolone 20mg IV daily   - plan for 20mg IV daily on 11/6 for 14 days (last dose of 20mg equivalent is 11/20/2022).   - apheresis: underwent PLEX 10/26, 10/27, 10/30, 11/1, 11/2, 11/3  - rituximab every 7 days for 4 doses: Dose #1: 10/27, #2 11/3, 11/10  - cyclophosphamide every 14 days for 2 doses: 10/27, 11/10  - Opportunistic Infection ppx: atovaquone 1500mg PO daily  - GI bleed prophylaxis: pantoprazole 40mg daily

## 2022-11-11 NOTE — PROGRESS NOTES
" Rheumatology Attending note:    I have seen the patient, reviewed the blood work, imaging and other studies.I have personally interviewed and examined the patient at bedside.     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   3)  CHANGES SUGGESTIVE OF FOCAL ACUTE PYELONEPHRITIS.   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 "              Treatments:  - s/p IV Solumedrol 1000 mg x3 doses. Now following PEXIVAS steroid taper. Currently on IV Solumedrol 20 mg daily.  - PLEX 10/26, 10/27, 10/29, 10/30, 11/1, 11/2, 11/3 (prior to Rituxan)  - Got SLED 10/27. Bedside HD 10/30  - Rituximab 375 mg/m^2 (600 mg) weekly for 4 doses  on 10/27 (every 7 day dose 1 of 4), 11/3 (dose 2 of 4) and 11/10    - Cyclophosphamide 750 mg/m2 on 10/27  and 11/10 (every 14 day dose 2 of 2)     Recommendations:  -continue Solu-Medrol 20 mg IV started on 11/6 for total of 14 days  -Next Rituximab 375 mg/m^2 due Nov 17th   "

## 2022-11-11 NOTE — ASSESSMENT & PLAN NOTE
SLP following  MBSS showing global weakness in swallowing as well as aspiration with thin liquids.   ENT consulted but patient did not tolerate laryngoscopy     Plan   - Discussed case with Rheumatology team, they are concerned that this dysphagia may have been from prior stroke, requesting imaging for evaluation-  CT demonstrated no acute findings or causes for dysphagia NOR did MRI   - removed NGT and place dobhoff which is more comfortable and makes swallowing easier compared to NGT.    - continue working with speech therapy   -exam concerning for thrush; nystatin swish and swallow initiated; GI consulted as concern that oropharyngeal candidiasis may be contributing to dysphagia

## 2022-11-11 NOTE — NURSING
End of shift:     -RA  -VSS  -NADN  -Novasource renal 20ml/hr to Dobhoff in Right nare.  -BG-wnl, 162  -Bed in lowest position, call light in reach, will continue to monitor and report to on coming RN.      Vandana MayerRN

## 2022-11-11 NOTE — PROGRESS NOTES
[facility] ([place] [address])  [facility]  Progress Note    Patient Name: Kristin Goodman  MRN: [id number]  Patient Class: IP- Inpatient   Admission Date: 10/17/2022  Length of Stay: 25 days  Attending Physician: Danielle Palomino MD  Primary Care Provider: Lori Hernandez MD        Subjective:     Principal Problem:Microscopic polyangiitis        HPI:  Kristin Goodman is a 77 y.o. female with a past medical history of HTN, hypothyroidism, HFpEF, and osteoarthritis of the arm who has presented to the ED for cough, SOB, and weakness. Daughter is present at the bedside. Patient presented to the ED on 9/24 with hemoptysis, CT and CXR showed high suspicion for multilobar pneumonia. Patient was discharged with a 10-day course of levofloxacin and an albuterol inhaler. Patient followed up with her PCP on 10/4 with slight improvement in symptoms and went back to work. Patient continued to have worsening symptoms and presented to the ED again on 10/14 for evaluation and no interventions were done. Patient endorses fevers over the last few days up to 102.4 F with progressive SOB. She endorses hemoptysis with moderate amount of blood, generalized weakness, productive cough, SOB, and loss of appetite. Denies chest pain, nausea, vomiting, abdominal pain, or urinary changes.    ED: hypertensive up to 219/93 and tachycardic up to 117. Oxygen saturation on 92% on RA, placed on 5L NC with sats >97%. CBC remarkable for leukocytosis of 16.03 and Hb 7.7. K 3.3. Cr 1.6, baseline ~1.0. . Troponin 0.061. COVID and flu negative. EKG NSR. CT chest with contrast pending at time of admission. Given home amlodipine and lisinopril. Given 500mL NS, IV azithromycin, cefepime and vanc.       Overview/Hospital Course:  Ms. Goodman was admitted to Hospital Medicine for management of multifocal pneumonia and acute hypoxemic respiratory failure after presenting to the ED with fevers, cough, hemoptysis, and dyspnea. She required  escalation to the Medical ICU due to abrupt decline in her respiratory status, associated with hemoptysis and requiring NIPPV. CT chest showed bilateral patchy ground glass opacities. Labs additionally were notable for acute renal failure on CKD3. Pulmonology was consulted while under the care of [facility] and felt this picture was consistent with diffuse alveolar hemorrhage.     Her workup revealed a strongly positive MPO Ab consistent with clinical picture of microscopic polyangiitis with pulmonary and renal involvement. She underwent Trialysis catheter placement 10/25/2022 in the Medical ICU. 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     Rheumatology was consulted, extensive workup revealed MITUL + 1:2560 homogenous, +dsDNA, normal complements, PR3 1.2 (slight +),  (+), cANCA + 1:80, pANCA neg, GBMAb neg, trace cryos. Due to concern for MPA, she was started on pulse dose IV methylprednisolone 1000mg for 3 days, and plasmapheresis under the guidance of Transfusion Medicine. She remains on a steroid taper and has completed PLEX. She additionally received rituximab and cyclophosphamide. OI prophylaxis was provided with atovaquone due to a sulfa allergy.     Nephrology was consulted for evaluation of acute renal failure in the setting of MPA. She did require SLED in the ICU for renal clearance and remains on intermittent hemodialysis. She is expected to continue HD once discharged.     Her acute hypoxemic respiratory failure improved and she was weaned off of supplemental oxygen. She stepped down to [facility] 10/28.     She underwent MBBS for evaluation of dysphagia, which showed global delayed initiation of swallow and aspiration with thin liquids. Due to  concern for possible prior stroke, head imaging was completed and negative for acute finding. She is NPO with NG tube. ENT was consulted for evaluation of dysphagia and cervical adenopathy.  Patient did not tolerate laryngoscopy; unable to visualize vocal cords but given her lack of hoarseness, they did not suspect vocal fold paresis/paralysis. MRI recommended by rheum to fully rule out any potential neurological cause of the patient's dysphagia; but demonstrated   remote left thalamic lacunar type infarct and punctate remote left cerebellar infarcts.  She is continuing to work with speech therapy.      She is due rituximab and cyclophosphamide on 11/10.     Her other chronic medical conditions including hypothyroidism, diastolic CHF, and hypertension were managed with her home medications, with dose adjustments as needed.         Interval History:   Patient seen and examined at bedside.    No acute events overnight.  Patient notes improvement of belching, intermittent nausea, and bad taste at back of mouth this AM.  Notes no nausea with chemo. She denies any abdo pain.  However, does notes some lateral, left neck pain when attempting swallow.      Review of Systems   Constitutional:  Positive for fatigue. Negative for chills, diaphoresis and fever.   HENT:  Positive for trouble swallowing. Negative for congestion and sore throat.    Eyes:  Negative for visual disturbance.   Respiratory:  Negative for cough, shortness of breath and wheezing.    Cardiovascular:  Negative for chest pain, palpitations and leg swelling.   Gastrointestinal:  Negative for abdominal pain, nausea and vomiting.   Genitourinary:  Positive for decreased urine volume.   Musculoskeletal:  Negative for arthralgias and myalgias.   Skin:  Negative for rash.   Neurological:  Positive for weakness. Negative for dizziness, light-headedness and headaches.   Psychiatric/Behavioral:  Negative for confusion, decreased concentration and sleep disturbance.       Objective:     Vital Signs (Most Recent):  Temp: 98.5 °F (36.9 °C) (11/11/22 1226)  Pulse: 72 (11/11/22 1345)  Resp: 18 (11/11/22 1226)  BP: 139/64 (11/11/22 1345)  SpO2: 99 % (11/11/22 1345)   Vital Signs (24h Range):  Temp:  [97.6 °F (36.4 °C)-98.7 °F (37.1 °C)] 98.5 °F (36.9 °C)  Pulse:  [64-83] 72  Resp:  [16-18] 18  SpO2:  [96 %-100 %] 99 %  BP: (125-161)/(60-76) 139/64     Weight: 63 kg (138 lb 14.2 oz)  Body mass index is 26.24 kg/m².    Intake/Output Summary (Last 24 hours) at 11/11/2022 1449  Last data filed at 11/11/2022 0517  Gross per 24 hour   Intake 2400 ml   Output --   Net 2400 ml        Physical Exam  Vitals and nursing note reviewed.   Constitutional:       General: She is awake.      Appearance: She is well-developed. She is ill-appearing. She is not toxic-appearing or diaphoretic.   HENT:      Head: Normocephalic and atraumatic.      Right Ear: External ear normal.      Left Ear: External ear normal.      Nose:      Comments: + NGT     Mouth/Throat:      Mouth: Mucous membranes are dry.      Comments: thrush  Eyes:      General: Lids are normal. No scleral icterus.        Right eye: No discharge.         Left eye: No discharge.   Cardiovascular:      Rate and Rhythm: Normal rate and regular rhythm.   Pulmonary:      Effort: Pulmonary effort is normal.      Breath sounds: Normal breath sounds. No wheezing or rhonchi.   Abdominal:      General: Bowel sounds are normal.      Palpations: Abdomen is soft.      Tenderness: There is no abdominal tenderness.   Musculoskeletal:         General: No deformity.      Cervical back: Normal range of motion and neck supple. Tenderness (left side near central line) present. No rigidity.      Right lower leg: No edema.      Left lower leg: No edema.   Skin:     General: Skin is warm and dry.   Neurological:      General: No focal deficit present.      Mental Status: She is alert and oriented to person, place, and time. Mental status is at baseline.    Psychiatric:         Attention and Perception: Attention normal.         Behavior: Behavior normal.       Significant Labs: All pertinent labs within the past 24 hours have been reviewed.    Recent Results (from the past 24 hour(s))   POCT glucose    Collection Time: 11/10/22  5:32 PM   Result Value Ref Range    POCT Glucose 159 (H) 70 - 110 mg/dL   POCT glucose    Collection Time: 11/11/22 12:01 AM   Result Value Ref Range    POCT Glucose 162 (H) 70 - 110 mg/dL   Comprehensive metabolic panel    Collection Time: 11/11/22  4:26 AM   Result Value Ref Range    Sodium 135 (L) 136 - 145 mmol/L    Potassium 4.7 3.5 - 5.1 mmol/L    Chloride 105 95 - 110 mmol/L    CO2 15 (L) 23 - 29 mmol/L    Glucose 131 (H) 70 - 110 mg/dL    BUN 84 (H) 8 - 23 mg/dL    Creatinine 5.1 (H) 0.5 - 1.4 mg/dL    Calcium 7.7 (L) 8.7 - 10.5 mg/dL    Total Protein 5.0 (L) 6.0 - 8.4 g/dL    Albumin 2.5 (L) 3.5 - 5.2 g/dL    Total Bilirubin 0.5 0.1 - 1.0 mg/dL    Alkaline Phosphatase 60 55 - 135 U/L    AST 16 10 - 40 U/L    ALT 20 10 - 44 U/L    Anion Gap 15 8 - 16 mmol/L    eGFR 8.3 (A) >60 mL/min/1.73 m^2   CBC auto differential    Collection Time: 11/11/22  4:26 AM   Result Value Ref Range    WBC 5.88 3.90 - 12.70 K/uL    RBC 2.67 (L) 4.00 - 5.40 M/uL    Hemoglobin 7.7 (L) 12.0 - 16.0 g/dL    Hematocrit 24.2 (L) 37.0 - 48.5 %    MCV 91 82 - 98 fL    MCH 28.8 27.0 - 31.0 pg    MCHC 31.8 (L) 32.0 - 36.0 g/dL    RDW 16.2 (H) 11.5 - 14.5 %    Platelets 126 (L) 150 - 450 K/uL    MPV 11.8 9.2 - 12.9 fL    Immature Granulocytes 0.3 0.0 - 0.5 %    Gran # (ANC) 5.2 1.8 - 7.7 K/uL    Immature Grans (Abs) 0.02 0.00 - 0.04 K/uL    Lymph # 0.5 (L) 1.0 - 4.8 K/uL    Mono # 0.2 (L) 0.3 - 1.0 K/uL    Eos # 0.0 0.0 - 0.5 K/uL    Baso # 0.00 0.00 - 0.20 K/uL    nRBC 0 0 /100 WBC    Gran % 88.0 (H) 38.0 - 73.0 %    Lymph % 9.0 (L) 18.0 - 48.0 %    Mono % 2.7 (L) 4.0 - 15.0 %    Eosinophil % 0.0 0.0 - 8.0 %    Basophil % 0.0 0.0 - 1.9 %    Differential Method  Automated    Magnesium    Collection Time: 11/11/22  4:26 AM   Result Value Ref Range    Magnesium 1.9 1.6 - 2.6 mg/dL   Phosphorus    Collection Time: 11/11/22  4:26 AM   Result Value Ref Range    Phosphorus 6.7 (H) 2.7 - 4.5 mg/dL   POCT glucose    Collection Time: 11/11/22 12:25 PM   Result Value Ref Range    POCT Glucose 178 (H) 70 - 110 mg/dL         Significant Imaging: I have reviewed all pertinent imaging results/findings within the past 24 hours.    Imaging Results               CT Chest With Contrast (Final result)  Result time 10/17/22 14:26:36      Final result by Lacy Camp MD (10/17/22 14:26:36)                   Impression:      Interval progression of bilateral perihilar dense consolidation with peripheral patchy areas of ground-glass opacification nonspecific as to the etiology.  Bilateral pneumonia as well as inflammatory etiologies considered.  Cardiogenic pulmonary edema considered less likely given the pattern.    Borderline sized mediastinal lymph node appears stable.    No other interval detrimental changes since 10/14/2022.    This report was flagged in Epic as abnormal.      Electronically signed by: Lacy Camp  Date:    10/17/2022  Time:    14:26               Narrative:    EXAMINATION:  CT CHEST WITH CONTRAST    CLINICAL HISTORY:  Aspiration;    TECHNIQUE:  Low dose axial images, sagittal and coronal reformations were obtained from the thoracic inlet to the lung bases following the IV administration of 75 mL of Omnipaque 350.    COMPARISON:  CT chest without contrast 10/14/2022, CTA chest 09/24/2022    FINDINGS:  Base of Neck: No significant abnormality.    Thoracic soft tissues: Unremarkable.    Aorta: Left-sided aortic arch.  No aneurysm and no significant atherosclerosis.    Heart: Normal in size.  There is no convincing pericardial effusion with prior trace effusion no longer apparent.    Pulmonary vasculature: Pulmonary arteries distribute normally with no visible  significant central pulmonary thromboemboli.  Pulmonary artery caliber is normal.    Essence/Mediastinum: 9 mm in short axis lymph node in the AP window again noted.  No convincing new adenopathy.  Axillary shotty lymph nodes with fatty essence appear stable.    Airways: Patent.    Lungs/Pleura: Perihilar alveolar opacities with ground-glass component appear more prominent bilaterally as compared to the previous examination 10/14/2022 with dense consolidation centered in the perihilar region.  More patchy ground-glass opacities extend to the periphery.  There is no acute pleural effusion.    Esophagus: Unremarkable.    Upper Abdomen: Multiple low densities are again noted in the liver left lobe too small to characterize but likely cysts largest measuring 8 mm axial image 93 series 2.  There is no new abnormality of the partially imaged upper abdomen.    Bones: There is no acute fracture or suspicious lytic or sclerotic lesion involving the imaged chest wall osseous structures.  There is spondylosis and kyphosis of the imaged spine.  No subluxation.                                          Assessment/Plan:      * Microscopic polyangiitis  As of 10/26/22 strongly positive MPO Ab (in contrast to borderline positive PR3), consistent with clinical picture of microscopic polyangiitis with pulmonary, and renal involvement after presenting with acute hypoxemic respiratory failure, hemoptysis, and acute renal failure.  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   3)  CHANGES SUGGESTIVE OF FOCAL ACUTE  PYELONEPHRITIS.   4)  MILD-TO-MODERATE ARTERIONEPHROSCLEROSIS   - Rheumatology consulted, appreciate evaluation and recommendations     - MITUL + 1:2560 homogenous, +dsDNA, normal complements     - PR3 1.2 (slight +),  (+)     - cANCA + 1:80, pANCA neg     - GBMAb neg, trace cryos  - Transfusion Medicine consulted for apheresis  - Nephrology consulted, see separate documentation for WILFREDO      Plan  - Steroids:    - completed IV methylprednisolone 1000mg x 3 days   - now on steroid taper as guided by Rheumatology   - currently methylprednisolone 20mg IV daily   - plan for 20mg IV daily on 11/6 for 14 days (last dose of 20mg equivalent is 11/20/2022).   - apheresis: underwent PLEX 10/26, 10/27, 10/30, 11/1, 11/2, 11/3  - rituximab every 7 days for 4 doses: Dose #1: 10/27, #2 11/3, 11/10  - cyclophosphamide every 14 days for 2 doses: 10/27, 11/10  - Opportunistic Infection ppx: atovaquone 1500mg PO daily  - GI bleed prophylaxis: pantoprazole 40mg daily     Gastric reflux  S/p GI cocktail  PPI BID  Elevated HOB; feeds changed to bolus  Symptoms improved      Gastrointestinal hemorrhage with melena  Starting having hemoptysis and melena with associated acute blood loss anemia earlier in hospital stay. Patient with known internal hemorrhoids, mild sigmoid diverticulosis, history of gastritis and + H pylori (2012 EGD).   - GI consulted 10/19, unable to perform EGD due to instability and respiratory failure at the time    Plan  - patient unable to take PO PPI due to NGT removal on 11/5, transition to IV PPI 40mg pantoprazole daily, return to per NG tube once replaced  -PPI transitioned to BID as concern for GERD  - Monitor CBC closely for signs of recurrent bleed          Dysphagia  SLP following  MBSS showing global weakness in swallowing as well as aspiration with thin liquids.   ENT consulted but patient did not tolerate laryngoscopy     Plan   - Discussed case with Rheumatology team, they are concerned that this  dysphagia may have been from prior stroke, requesting imaging for evaluation-  CT demonstrated no acute findings or causes for dysphagia NOR did MRI   - removed NGT and place dobhoff which is more comfortable and makes swallowing easier compared to NGT.    - continue working with speech therapy   -exam concerning for thrush; nystatin swish and swallow initiated; GI consulted as concern that oropharyngeal candidiasis may be contributing to dysphagia    Moderate malnutrition  Nutrition consulted. Most recent weight and BMI monitored-          Malnutrition (Moderate to Severe)  Weight Loss (Malnutrition): 7.5% in 3 months              Measurements:  Wt Readings from Last 1 Encounters:   11/09/22 63 kg (138 lb 14.2 oz)   Body mass index is 26.24 kg/m².    Recommendations: Recommendation/Intervention: 1. As tolerated, increase TF rate (of Novasource) to 35 mL/hr  - 2. RD to monitor & follow-up.  Goals: Meet % EEN, EPN by RD f/u date    Patient has been screened and assessed by RD. RD will follow patient.      WILFREDO (acute kidney injury)  Due to microscopic polyangiitis. Remains on hemodialysis intermittently.   - Baseline creatinine 1.0-1.2.   - New onset proteinuria/hematuria.   - Renal biopsy completed and   - Trialysis in place for intermittent Hemodialysis    Plan  - Nephrology consulted, appreciate management  - management of MPA as noted separately  - Renal function panel daily  - renally dose all medications  - intermittent Hemodialysis as indicated, per Nephrology     Acute hypoxemic respiratory failure  Multifocal pneumonia  Hemoptysis  Dyspnea  Diffuse Alveolar Hemorrahge  In the setting of a new diagnosis of microscopic polyangiitis, presented with fevers, dyspnea,.  - Chest imaging was consistent with diffuse alveolar hemorrhage.  CT chest wc 10/17 with bl perihilar dense consolidations w/ peripheral patcy areas of GGO, BL PNA, less c/f cardiogenic pulmonary edema.  - Patient upgraded to Medical ICU  10/23/22 with abrupt respiratory decline requiring NIPPV, improved with high dose IV steroids, plasmapheresis, emergent hemodialysis.   - Unable to perform bronchoscopy in the Medical ICU due to tenuous respiratory status  - As of 11/6, hypoxemia has significantly improved    Plan:  - weaned to room air  - continue management of underlying triggers with steroid and immunosuppressants  - incentive spirometry and respiratory hygiene    Lymphadenopathy of head and neck  - Has bilateral neck gland swelling with tenderness,consulted ENT  - No surgical intervention indicated   - Adenopathy likely reactive in nature   - Recommend further workup if it does not resolve within next 2 weeks     Acute blood loss anemia  In the setting of GI bleed with melena  - Evaluated by GI, see GI bleed documentation  - Last transfusion 10/28, Hgb slowly trending down since then  - Hgb 6.9 on 11/5      Plan  - Monitor CBC Daily   - Treat with pRBC transfusion PRN Hgb <7  - qualifies for transfusion on 11/5 with Hgb 6.9, 1u pRBC ordered  -stable       Chronic diastolic heart failure  Pulmonary Hypertension due to left heart disease  TTE (10/2022) EF 65%, G1DD  Home meds: Lisinopril, Toprol     No signs or symptoms to suggest acute exacerbation    Plan  - Active volume control with intermittent Hemodialysis per Nephrology   - Daily weights (standing if tolerated)  - Strict I/Os  - Fluid restriction 1.5 day  - Cardiac diet w/ 2 g Na restriction      Primary hypertension  BP Readings from Last 1 Encounters:   11/11/22 139/64     - Patient is unable to tolerate PO mediations, all meds to be administered via NG tube  -Will continue to monitor blood pressure trend closely and adjust antihypertensive regimen as clinically indicated and tolerated.    amLODIPine tablet 10 mg, 10 mg, Per NG tube, Daily    carvediloL tablet 12.5 mg, 12.5 mg, Per NG tube, BID    hydrALAZINE tablet 25 mg, 25 mg, Per NG tube, Q8H    lisinopriL tablet 40 mg, 40 mg, Per  NG tube, Daily      Hypothyroid  - Continue synthroid 25 mcg  - TSH 0.585 10/27/22      VTE Risk Mitigation (From admission, onward)         Ordered     heparin, porcine (PF) 100 unit/mL injection flush 500 Units  As needed (PRN)         11/10/22 1430     heparin (porcine) injection 1,000 Units  As needed (PRN)         11/09/22 1601     heparin (porcine) injection 1,000 Units  As needed (PRN)         11/08/22 0854     Place sequential compression device  Until discontinued         10/25/22 1731     IP VTE HIGH RISK PATIENT  Once         10/17/22 1354     Reason for No Pharmacological VTE Prophylaxis  Once        Question:  Reasons:  Answer:  Risk of Bleeding    10/17/22 1354                Discharge Planning   ANTHONY: 11/16/2022     Code Status: Full Code   Is the patient medically ready for discharge?: No    Reason for patient still in hospital (select all that apply): Patient trending condition, Imaging, Consult recommendations and Pending disposition  Discharge Plan A: Home with family                  Danielle Palomino MD  Department of Hospital Medicine   [facility] - Intensive Care ([place]-14)

## 2022-11-11 NOTE — SUBJECTIVE & OBJECTIVE
Interval History: reported large volume void, uncharted.    Review of patient's allergies indicates:   Allergen Reactions    Ampicillin     Peaches [peach (prunus persica)] Other (See Comments)     Pt unable to state type of reaction. Information obtained from daughter who states she was informed of allergy from patient.    Penicillins      Other reaction(s): Hives    Sulfa (sulfonamide antibiotics) Rash and Hives     Current Facility-Administered Medications   Medication Frequency    0.9%  NaCl infusion (for blood administration) Q24H PRN    0.9%  NaCl infusion (for blood administration) Q24H PRN    0.9%  NaCl infusion (for blood administration) Q24H PRN    0.9%  NaCl infusion Once    0.9%  NaCl infusion Once    acetaminophen tablet 650 mg Q4H PRN    albuterol-ipratropium 2.5 mg-0.5 mg/3 mL nebulizer solution 3 mL Q4H PRN    aluminum-magnesium hydroxide-simethicone 200-200-20 mg/5 mL suspension 30 mL QID PRN    amLODIPine tablet 10 mg Daily    atovaquone 750 mg/5 mL oral liquid 1,500 mg Daily    bisacodyL suppository 10 mg Daily PRN    carvediloL tablet 25 mg BID    cloNIDine tablet 0.1 mg Q6H PRN    dextrose 10% bolus 125 mL PRN    dextrose 10% bolus 250 mL PRN    glucagon (human recombinant) injection 1 mg PRN    glucose chewable tablet 16 g PRN    glucose chewable tablet 24 g PRN    heparin (porcine) injection 1,000 Units PRN    heparin, porcine (PF) 100 unit/mL injection flush 500 Units PRN    hydrALAZINE tablet 100 mg Q8H    insulin aspart U-100 pen 1-10 Units Q6H PRN    levothyroxine tablet 25 mcg Before breakfast    lisinopriL tablet 40 mg Daily    melatonin tablet 6 mg Nightly PRN    meperidine injection 25 mg PRN    methocarbamoL tablet 500 mg TID PRN    methylPREDNISolone sodium succinate injection 20 mg Daily    naloxone 0.4 mg/mL injection 0.02 mg PRN    nystatin 100,000 unit/mL suspension 500,000 Units QID    ondansetron injection 4 mg Q8H PRN    pantoprazole injection 40 mg Q12H    prochlorperazine  injection Soln 5 mg Q6H PRN    QUEtiapine tablet 25 mg QHS    simethicone chewable tablet 80 mg QID PRN    sodium chloride 0.9% 250 mL flush bag 1 time in Clinic/HOD    sodium chloride 0.9% flush 10 mL PRN    sodium chloride 0.9% flush 10 mL PRN    sodium chloride 0.9% flush 10 mL PRN    sodium chloride 0.9% flush 5 mL PRN     Facility-Administered Medications Ordered in Other Encounters   Medication Frequency    celecoxib capsule 400 mg Once    fentaNYL 50 mcg/mL injection  mcg PRN    LIDOcaine (PF) 10 mg/ml (1%) injection 10 mg Once PRN    LIDOcaine (PF) 10 mg/ml (1%) injection 10 mg Once    midazolam (VERSED) 1 mg/mL injection 0.5-4 mg PRN    ropivacaine 0.2% Nimbus PainPRO Pump infusion 500 ML Continuous       Objective:     Vital Signs (Most Recent):  Temp: 98.5 °F (36.9 °C) (11/11/22 1226)  Pulse: 72 (11/11/22 1345)  Resp: 18 (11/11/22 1226)  BP: 139/64 (11/11/22 1345)  SpO2: 99 % (11/11/22 1345)  O2 Device (Oxygen Therapy): room air (11/11/22 1330) Vital Signs (24h Range):  Temp:  [97.6 °F (36.4 °C)-98.7 °F (37.1 °C)] 98.5 °F (36.9 °C)  Pulse:  [64-83] 72  Resp:  [16-18] 18  SpO2:  [96 %-100 %] 99 %  BP: (125-161)/(60-76) 139/64     Weight: 63 kg (138 lb 14.2 oz) (11/09/22 1000)  Body mass index is 26.24 kg/m².  Body surface area is 1.65 meters squared.    I/O last 3 completed shifts:  In: 3550 [NG/GT:2100; IV Piggyback:1450]  Out: -     Physical Exam  Vitals and nursing note reviewed.   Constitutional:       General: She is not in acute distress.     Appearance: She is well-developed. She is ill-appearing. She is not toxic-appearing.      Comments: Patient awake and alert and in no distress   HENT:      Head: Normocephalic and atraumatic.      Mouth/Throat:      Mouth: Mucous membranes are dry.   Eyes:      Conjunctiva/sclera: Conjunctivae normal.   Cardiovascular:      Rate and Rhythm: Normal rate and regular rhythm.      Heart sounds: Normal heart sounds.   Pulmonary:      Effort: Pulmonary effort is  normal. No respiratory distress.      Breath sounds: No wheezing or rales.      Comments: Coarse breath sounds bilaterally  Abdominal:      General: Bowel sounds are normal. There is no distension.      Palpations: Abdomen is soft.      Tenderness: There is no abdominal tenderness.   Musculoskeletal:         General: Swelling present. No tenderness. Normal range of motion.      Cervical back: Normal range of motion and neck supple.      Right lower leg: No edema.      Left lower leg: No edema.   Lymphadenopathy:      Cervical: No cervical adenopathy.   Skin:     General: Skin is warm and dry.      Capillary Refill: Capillary refill takes less than 2 seconds.      Findings: No rash.   Neurological:      General: No focal deficit present.      Mental Status: She is alert and oriented to person, place, and time.       Significant Labs:  All labs within the past 24 hours have been reviewed.     Significant Imaging:  Labs: Reviewed

## 2022-11-11 NOTE — PT/OT/SLP PROGRESS
"Occupational Therapy   Treatment    Name: Kristin Goodman  MRN: [id number]  Admitting Diagnosis:  Microscopic polyangiitis       Recommendations:     Discharge Recommendations: nursing facility, skilled  Discharge Equipment Recommendations:  bedside commode  Barriers to discharge:  None    Assessment:     Kristin Goodman is a 77 y.o. female with a medical diagnosis of Microscopic polyangiitis.  She presents with a pleasant demeanor and eager to participate in today's therapy session. Performance deficits affecting function are weakness, impaired endurance, impaired self care skills, impaired functional mobility, impaired balance, decreased upper extremity function, decreased lower extremity function, decreased safety awareness.     Rehab Prognosis:  Good; patient would benefit from acute skilled OT services to address these deficits and reach maximum level of function.       Plan:     Patient to be seen 3 x/week to address the above listed problems via self-care/home management, therapeutic activities, therapeutic exercises, neuromuscular re-education  Plan of Care Expires: 11/20/22  Plan of Care Reviewed with: patient, daughter    Subjective   "I miss my babies, that's what I wanna get back to."    Pain/Comfort:  Pain Rating 1: 0/10  Pain Rating Post-Intervention 1: 0/10    Objective:     Communicated with: RN   prior to session.  Patient found  sitting [facility], daughter present  with peripheral IV, NG-tube upon OT entry to room.    A client care conference was completed by the OTR and the LITTLEJOHN prior to treatment by the LITTLEJOHN to discuss the patient's POC and current status.      General Precautions: Standard, aspiration, fall   Orthopedic Precautions:N/A   Braces: N/A  Respiratory Status: Room air     Occupational Performance:    Activities of Daily Living:  Grooming: stand by assistance for oral and facial g/h with SetupA  while seated in bedside chair      [facility] 6 Click ADL: 18     Treatment & Education:  -Pt educated on " role of OT per POC  -Pt performed AROM 1x10 BUE digits I-V flex/ext, single digit isolation with verbal/visual cues, and elbow flex/ext  -Theraputty discussed with Pt and Dtr, anticipate provided @ next therapy session    Patient left  sitting up in chair with BLE elevated  with all lines intact, call button in reach, and daughter present    GOALS:   Multidisciplinary Problems       Occupational Therapy Goals          Problem: Occupational Therapy    Goal Priority Disciplines Outcome Interventions   Occupational Therapy Goal     OT, PT/OT Ongoing, Progressing    Description: Goals to be met by: 11/15     Patient will increase functional independence with ADLs by performing:    UE Dressing with Modified [place].  LE Dressing with Modified [place].  Grooming while standing with Modified [place].  Toileting from toilet with Modified [place] for hygiene and clothing management.   Supine to sit with Modified [place].  Step transfer with Modified [place]  Toilet transfer to toilet with Modified [place].                         Time Tracking:     OT Date of Treatment: 11/11/22  OT Start Time: 0934  OT Stop Time: 1005  OT Total Time (min): 31 min    Billable Minutes:Self Care/Home Management 20  Therapeutic Exercise 11    OT/SARAHI: SARAHI MCCLAIN Visit Number: 1    11/11/2022

## 2022-11-11 NOTE — NURSING
Patient transferred to dialysis via wheel chair.Patient report given to Arlene COLUNGA prior to transfer. VSS. Patient able to walk to wheel chair safely with 2 person assist.

## 2022-11-11 NOTE — PROGRESS NOTES
[facility] ([place] [id number])  Nephrology  Progress Note    Patient Name: Kristin Goodman  MRN: [id number]  Admission Date: 10/17/2022  Hospital Length of Stay: 25 days  Attending Provider: Danielle Palomino MD   Primary Care Physician: Lori Hernandez MD  Principal Problem:Microscopic polyangiitis    Subjective:     HPI: Kristin Goodman is a 77 year old female with hypertension, hypothyroidism, HFpEF who presents for cough, shortness of breath, and weakness.  Patient initially presented to ER on 09/24 with hemoptysis and imaging concerning for multilobar pneumonia at which time she was discharged on a 10 day course of levofloxacin.  Patient initially had some improvement but began having worsening symptoms on 10/14.  Patient describes multiple fevers and progressive shortness of breath.  She continues to have intermittent hemoptysis.  Patient with worsening leukocytosis and limited clinical improvement despite broad-spectrum antibiotics.  She remains on cefepime.  Patient is a noted to have baseline creatinine of 1 as recent as 8/2022 but progressive worsening of creatinine in early October.  On admission patient with creatinine of 1.6 (10/17) with subsequent progression and creatinine of 3.0 at time of consultation (10/23).  Nephrology consulted for acute kidney injury.      Interval History: reported large volume void, uncharted.    Review of patient's allergies indicates:   Allergen Reactions    Ampicillin     Peaches [peach (prunus persica)] Other (See Comments)     Pt unable to state type of reaction. Information obtained from daughter who states she was informed of allergy from patient.    Penicillins      Other reaction(s): Hives    Sulfa (sulfonamide antibiotics) Rash and Hives     Current Facility-Administered Medications   Medication Frequency    0.9%  NaCl infusion (for blood administration) Q24H PRN    0.9%  NaCl infusion (for blood administration) Q24H PRN    0.9%  NaCl infusion (for blood  administration) Q24H PRN    0.9%  NaCl infusion Once    0.9%  NaCl infusion Once    acetaminophen tablet 650 mg Q4H PRN    albuterol-ipratropium 2.5 mg-0.5 mg/3 mL nebulizer solution 3 mL Q4H PRN    aluminum-magnesium hydroxide-simethicone 200-200-20 mg/5 mL suspension 30 mL QID PRN    amLODIPine tablet 10 mg Daily    atovaquone 750 mg/5 mL oral liquid 1,500 mg Daily    bisacodyL suppository 10 mg Daily PRN    carvediloL tablet 25 mg BID    cloNIDine tablet 0.1 mg Q6H PRN    dextrose 10% bolus 125 mL PRN    dextrose 10% bolus 250 mL PRN    glucagon (human recombinant) injection 1 mg PRN    glucose chewable tablet 16 g PRN    glucose chewable tablet 24 g PRN    heparin (porcine) injection 1,000 Units PRN    heparin, porcine (PF) 100 unit/mL injection flush 500 Units PRN    hydrALAZINE tablet 100 mg Q8H    insulin aspart U-100 pen 1-10 Units Q6H PRN    levothyroxine tablet 25 mcg Before breakfast    lisinopriL tablet 40 mg Daily    melatonin tablet 6 mg Nightly PRN    meperidine injection 25 mg PRN    methocarbamoL tablet 500 mg TID PRN    methylPREDNISolone sodium succinate injection 20 mg Daily    naloxone 0.4 mg/mL injection 0.02 mg PRN    nystatin 100,000 unit/mL suspension 500,000 Units QID    ondansetron injection 4 mg Q8H PRN    pantoprazole injection 40 mg Q12H    prochlorperazine injection Soln 5 mg Q6H PRN    QUEtiapine tablet 25 mg QHS    simethicone chewable tablet 80 mg QID PRN    sodium chloride 0.9% 250 mL flush bag 1 [facility] in Clinic[facility]    sodium chloride 0.9% flush 10 mL PRN    sodium chloride 0.9% flush 10 mL PRN    sodium chloride 0.9% flush 10 mL PRN    sodium chloride 0.9% flush 5 mL PRN     Facility-Administered Medications Ordered in Other Encounters   Medication Frequency    celecoxib capsule 400 mg Once    fentaNYL 50 mcg/mL injection  mcg PRN    LIDOcaine (PF) 10 mg/ml (1%) injection 10 mg Once PRN    LIDOcaine (PF) 10 mg/ml (1%) injection 10 mg Once    midazolam (VERSED) 1 mg/mL  injection 0.5-4 mg PRN    ropivacaine 0.2% Nimbus PainPRO Pump infusion 500 ML Continuous       Objective:     Vital Signs (Most Recent):  Temp: 98.5 °F (36.9 °C) (11/11/22 1226)  Pulse: 72 (11/11/22 1345)  Resp: 18 (11/11/22 1226)  BP: 139/64 (11/11/22 1345)  SpO2: 99 % (11/11/22 1345)  O2 Device (Oxygen Therapy): room air (11/11/22 1330) Vital Signs (24h Range):  Temp:  [97.6 °F (36.4 °C)-98.7 °F (37.1 °C)] 98.5 °F (36.9 °C)  Pulse:  [64-83] 72  Resp:  [16-18] 18  SpO2:  [96 %-100 %] 99 %  BP: (125-161)/(60-76) 139/64     Weight: 63 kg (138 lb 14.2 oz) (11/09/22 1000)  Body mass index is 26.24 kg/m².  Body surface area is 1.65 meters squared.    I/O last 3 completed shifts:  In: 3550 [NG/GT:2100; IV Piggyback:1450]  Out: -     Physical Exam  Vitals and nursing note reviewed.   Constitutional:       General: She is not in acute distress.     Appearance: She is well-developed. She is ill-appearing. She is not toxic-appearing.      Comments: Patient awake and alert and in no distress   HENT:      Head: Normocephalic and atraumatic.      Mouth/Throat:      Mouth: Mucous membranes are dry.   Eyes:      Conjunctiva/sclera: Conjunctivae normal.   Cardiovascular:      Rate and Rhythm: Normal rate and regular rhythm.      Heart sounds: Normal heart sounds.   Pulmonary:      Effort: Pulmonary effort is normal. No respiratory distress.      Breath sounds: No wheezing or rales.      Comments: Coarse breath sounds bilaterally  Abdominal:      General: Bowel sounds are normal. There is no distension.      Palpations: Abdomen is soft.      Tenderness: There is no abdominal tenderness.   Musculoskeletal:         General: Swelling present. No tenderness. Normal range of motion.      Cervical back: Normal range of motion and neck supple.      Right lower leg: No edema.      Left lower leg: No edema.   Lymphadenopathy:      Cervical: No cervical adenopathy.   Skin:     General: Skin is warm and dry.      Capillary Refill: Capillary  "refill takes less than 2 seconds.      Findings: No rash.   Neurological:      General: No focal deficit present.      Mental Status: She is alert and oriented to person, place, and time.       Significant Labs:  All labs within the past 24 hours have been reviewed.     Significant Imaging:  Labs: Reviewed    Assessment/Plan:     WILFREDO (acute kidney injury)  Patient with baseline creatinine of 1 as recent as August 2022 with progressive worsening beginning in early October 1.3 (10/13)->1.6 (10/17)->3.0 (10/23) despite IV fluids.  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  Patient noted to have new onset proteinuria and hematuria over the last 1 month.  Additionally, would consider ATN in the setting of suspected sepsis from multifocal pneumonia.      Concerns for glomerulonephritis given patient's current clinical picture. Initial urine microscopy demonstrating muddy brown casts with likely acanthocytes noted as well. MITUL positive, proteinase 3 ab weakly positive, MPO strongly positive. Patient s/p high-dose IV solumedrol x3 doses, now on Solumedrol 50mg qd. Underwent kidney bx 10/27. Rheumatology consulted, and patient started on Plex, Ritux/cytoxan. Given continually worsening renal function and uremic encephalopathy, patient underwent SLED without complication on 10/27. Transferred to floor on 10/29. Significantly improved mentation on 10/31. Underwent HD 11/1. Patient also with increasing hypernatremia so added FWF to tube feeds.      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 Second dose Ritux on 11/3. Next dose of cytoxan on 11/10. Will discuss with Rheum regarding maintenance dosing.      Recommendations:  - Reported large volume void on 11/11.   Hold HD and do not place " permacath at this time. Hopeful for renal recovery  - continue solumedrol 20 mg qd until 11/20, then 15 for 14 days, then 12.5 for 14 days, then 10 for 14 days, then 7.5 for 14 days then possibly life long 5 mg. (per PEXIVAS trial)  - strict intake and output  - renally dose medications and avoid nephrotoxins when possible  - replete electrolytes as appropriate      Thank you for your consult. I will follow-up with patient. Please contact us if you have any additional questions.    Blue Puente MD  Nephrology  [facility] ([place]

## 2022-11-11 NOTE — PLAN OF CARE
Patient will need HD  as ot patient upon DC.  She will also need skilled and would prefer O-skilled. Referral sent for O-skilled as well as for out patient HD. Plan is for permeant HD cath  early next week and will likely be medically ready for Dc middle of the week. CM to follow for DC planning needs
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
## 2022-11-11 NOTE — NURSING
CT called to verify if patient was ready for stat CT of neck. RN answered that patient had left for dialysis a few minutes ago and that it was going to take at least 4 hrs until done. CT staff stated she was going to take note of it.

## 2022-11-11 NOTE — NURSING
Patients daughter Roro called for update, update given. All questions answered. No c/c at this time.

## 2022-11-11 NOTE — ASSESSMENT & PLAN NOTE
Pulmonary Hypertension due to left heart disease  TTE (10/2022) EF 65%, G1DD  Home meds: Lisinopril, Toprol     No signs or symptoms to suggest acute exacerbation    Plan  - Active volume control with intermittent Hemodialysis per Nephrology   - Daily weights (standing if tolerated)  - Strict I/Os  - Fluid restriction 1.5 day  - Cardiac diet w/ 2 g Na restriction

## 2022-11-11 NOTE — PLAN OF CARE
Pt sleeping throughout the day, but arouses easily. OX4. Tube feed held/rate decreased due to nausea and emesis X 1. Goal rate is 20. Tube feeds currently at 10. Abdominal xray ordered. Tube in place. Pt receiving chemo drugs. No c/o pain or discomfort. Will continue to monitor.

## 2022-11-11 NOTE — ASSESSMENT & PLAN NOTE
BP Readings from Last 1 Encounters:   11/11/22 139/64     - Patient is unable to tolerate PO mediations, all meds to be administered via NG tube  -Will continue to monitor blood pressure trend closely and adjust antihypertensive regimen as clinically indicated and tolerated.    amLODIPine tablet 10 mg, 10 mg, Per NG tube, Daily    carvediloL tablet 12.5 mg, 12.5 mg, Per NG tube, BID    hydrALAZINE tablet 25 mg, 25 mg, Per NG tube, Q8H    lisinopriL tablet 40 mg, 40 mg, Per NG tube, Daily

## 2022-11-11 NOTE — SUBJECTIVE & OBJECTIVE
Interval History:   Patient seen and examined at bedside.    No acute events overnight.  Patient notes improvement of belching, intermittent nausea, and bad taste at back of mouth this AM.  Notes no nausea with chemo. She denies any abdo pain.  However, does notes some lateral, left neck pain when attempting swallow.      Review of Systems   Constitutional:  Positive for fatigue. Negative for chills, diaphoresis and fever.   HENT:  Positive for trouble swallowing. Negative for congestion and sore throat.    Eyes:  Negative for visual disturbance.   Respiratory:  Negative for cough, shortness of breath and wheezing.    Cardiovascular:  Negative for chest pain, palpitations and leg swelling.   Gastrointestinal:  Negative for abdominal pain, nausea and vomiting.   Genitourinary:  Positive for decreased urine volume.   Musculoskeletal:  Negative for arthralgias and myalgias.   Skin:  Negative for rash.   Neurological:  Positive for weakness. Negative for dizziness, light-headedness and headaches.   Psychiatric/Behavioral:  Negative for confusion, decreased concentration and sleep disturbance.      Objective:     Vital Signs (Most Recent):  Temp: 98.5 °F (36.9 °C) (11/11/22 1226)  Pulse: 72 (11/11/22 1345)  Resp: 18 (11/11/22 1226)  BP: 139/64 (11/11/22 1345)  SpO2: 99 % (11/11/22 1345)   Vital Signs (24h Range):  Temp:  [97.6 °F (36.4 °C)-98.7 °F (37.1 °C)] 98.5 °F (36.9 °C)  Pulse:  [64-83] 72  Resp:  [16-18] 18  SpO2:  [96 %-100 %] 99 %  BP: (125-161)/(60-76) 139/64     Weight: 63 kg (138 lb 14.2 oz)  Body mass index is 26.24 kg/m².    Intake/Output Summary (Last 24 hours) at 11/11/2022 1442  Last data filed at 11/11/2022 0517  Gross per 24 hour   Intake 2400 ml   Output --   Net 2400 ml        Physical Exam  Vitals and nursing note reviewed.   Constitutional:       General: She is awake.      Appearance: She is well-developed. She is ill-appearing. She is not toxic-appearing or diaphoretic.   HENT:      Head:  Normocephalic and atraumatic.      Right Ear: External ear normal.      Left Ear: External ear normal.      Nose:      Comments: + NGT     Mouth/Throat:      Mouth: Mucous membranes are dry.      Comments: thrush  Eyes:      General: Lids are normal. No scleral icterus.        Right eye: No discharge.         Left eye: No discharge.   Cardiovascular:      Rate and Rhythm: Normal rate and regular rhythm.   Pulmonary:      Effort: Pulmonary effort is normal.      Breath sounds: Normal breath sounds. No wheezing or rhonchi.   Abdominal:      General: Bowel sounds are normal.      Palpations: Abdomen is soft.      Tenderness: There is no abdominal tenderness.   Musculoskeletal:         General: No deformity.      Cervical back: Normal range of motion and neck supple. Tenderness (left side near central line) present. No rigidity.      Right lower leg: No edema.      Left lower leg: No edema.   Skin:     General: Skin is warm and dry.   Neurological:      General: No focal deficit present.      Mental Status: She is alert and oriented to person, place, and time. Mental status is at baseline.   Psychiatric:         Attention and Perception: Attention normal.         Behavior: Behavior normal.       Significant Labs: All pertinent labs within the past 24 hours have been reviewed.    Recent Results (from the past 24 hour(s))   POCT glucose    Collection Time: 11/10/22  5:32 PM   Result Value Ref Range    POCT Glucose 159 (H) 70 - 110 mg/dL   POCT glucose    Collection Time: 11/11/22 12:01 AM   Result Value Ref Range    POCT Glucose 162 (H) 70 - 110 mg/dL   Comprehensive metabolic panel    Collection Time: 11/11/22  4:26 AM   Result Value Ref Range    Sodium 135 (L) 136 - 145 mmol/L    Potassium 4.7 3.5 - 5.1 mmol/L    Chloride 105 95 - 110 mmol/L    CO2 15 (L) 23 - 29 mmol/L    Glucose 131 (H) 70 - 110 mg/dL    BUN 84 (H) 8 - 23 mg/dL    Creatinine 5.1 (H) 0.5 - 1.4 mg/dL    Calcium 7.7 (L) 8.7 - 10.5 mg/dL    Total Protein  5.0 (L) 6.0 - 8.4 g/dL    Albumin 2.5 (L) 3.5 - 5.2 g/dL    Total Bilirubin 0.5 0.1 - 1.0 mg/dL    Alkaline Phosphatase 60 55 - 135 U/L    AST 16 10 - 40 U/L    ALT 20 10 - 44 U/L    Anion Gap 15 8 - 16 mmol/L    eGFR 8.3 (A) >60 mL/min/1.73 m^2   CBC auto differential    Collection Time: 11/11/22  4:26 AM   Result Value Ref Range    WBC 5.88 3.90 - 12.70 K/uL    RBC 2.67 (L) 4.00 - 5.40 M/uL    Hemoglobin 7.7 (L) 12.0 - 16.0 g/dL    Hematocrit 24.2 (L) 37.0 - 48.5 %    MCV 91 82 - 98 fL    MCH 28.8 27.0 - 31.0 pg    MCHC 31.8 (L) 32.0 - 36.0 g/dL    RDW 16.2 (H) 11.5 - 14.5 %    Platelets 126 (L) 150 - 450 K/uL    MPV 11.8 9.2 - 12.9 fL    Immature Granulocytes 0.3 0.0 - 0.5 %    Gran # (ANC) 5.2 1.8 - 7.7 K/uL    Immature Grans (Abs) 0.02 0.00 - 0.04 K/uL    Lymph # 0.5 (L) 1.0 - 4.8 K/uL    Mono # 0.2 (L) 0.3 - 1.0 K/uL    Eos # 0.0 0.0 - 0.5 K/uL    Baso # 0.00 0.00 - 0.20 K/uL    nRBC 0 0 /100 WBC    Gran % 88.0 (H) 38.0 - 73.0 %    Lymph % 9.0 (L) 18.0 - 48.0 %    Mono % 2.7 (L) 4.0 - 15.0 %    Eosinophil % 0.0 0.0 - 8.0 %    Basophil % 0.0 0.0 - 1.9 %    Differential Method Automated    Magnesium    Collection Time: 11/11/22  4:26 AM   Result Value Ref Range    Magnesium 1.9 1.6 - 2.6 mg/dL   Phosphorus    Collection Time: 11/11/22  4:26 AM   Result Value Ref Range    Phosphorus 6.7 (H) 2.7 - 4.5 mg/dL   POCT glucose    Collection Time: 11/11/22 12:25 PM   Result Value Ref Range    POCT Glucose 178 (H) 70 - 110 mg/dL         Significant Imaging: I have reviewed all pertinent imaging results/findings within the past 24 hours.    Imaging Results               CT Chest With Contrast (Final result)  Result time 10/17/22 14:26:36      Final result by Lacy Camp MD (10/17/22 14:26:36)                   Impression:      Interval progression of bilateral perihilar dense consolidation with peripheral patchy areas of ground-glass opacification nonspecific as to the etiology.  Bilateral pneumonia as well as  inflammatory etiologies considered.  Cardiogenic pulmonary edema considered less likely given the pattern.    Borderline sized mediastinal lymph node appears stable.    No other interval detrimental changes since 10/14/2022.    This report was flagged in Epic as abnormal.      Electronically signed by: Lacy Camp  Date:    10/17/2022  Time:    14:26               Narrative:    EXAMINATION:  CT CHEST WITH CONTRAST    CLINICAL HISTORY:  Aspiration;    TECHNIQUE:  Low dose axial images, sagittal and coronal reformations were obtained from the thoracic inlet to the lung bases following the IV administration of 75 mL of Omnipaque 350.    COMPARISON:  CT chest without contrast 10/14/2022, CTA chest 09/24/2022    FINDINGS:  Base of Neck: No significant abnormality.    Thoracic soft tissues: Unremarkable.    Aorta: Left-sided aortic arch.  No aneurysm and no significant atherosclerosis.    Heart: Normal in size.  There is no convincing pericardial effusion with prior trace effusion no longer apparent.    Pulmonary vasculature: Pulmonary arteries distribute normally with no visible significant central pulmonary thromboemboli.  Pulmonary artery caliber is normal.    Essence/Mediastinum: 9 mm in short axis lymph node in the AP window again noted.  No convincing new adenopathy.  Axillary shotty lymph nodes with fatty essence appear stable.    Airways: Patent.    Lungs/Pleura: Perihilar alveolar opacities with ground-glass component appear more prominent bilaterally as compared to the previous examination 10/14/2022 with dense consolidation centered in the perihilar region.  More patchy ground-glass opacities extend to the periphery.  There is no acute pleural effusion.    Esophagus: Unremarkable.    Upper Abdomen: Multiple low densities are again noted in the liver left lobe too small to characterize but likely cysts largest measuring 8 mm axial image 93 series 2.  There is no new abnormality of the partially imaged upper  abdomen.    Bones: There is no acute fracture or suspicious lytic or sclerotic lesion involving the imaged chest wall osseous structures.  There is spondylosis and kyphosis of the imaged spine.  No subluxation.

## 2022-11-11 NOTE — ASSESSMENT & PLAN NOTE
"Patient with baseline creatinine of 1 as recent as August 2022 with progressive worsening beginning in early October 1.3 (10/13)->1.6 (10/17)->3.0 (10/23) despite IV fluids.  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  Patient noted to have new onset proteinuria and hematuria over the last 1 month.  Additionally, would consider ATN in the setting of suspected sepsis from multifocal pneumonia.      Concerns for glomerulonephritis given patient's current clinical picture. Initial urine microscopy demonstrating muddy brown casts with likely acanthocytes noted as well. MITUL positive, proteinase 3 ab weakly positive, MPO strongly positive. Patient s/p high-dose IV solumedrol x3 doses, now on Solumedrol 50mg qd. Underwent kidney bx 10/27. Rheumatology consulted, and patient started on Plex, Ritux/cytoxan. Given continually worsening renal function and uremic encephalopathy, patient underwent SLED without complication on 10/27. Transferred to floor on 10/29. Significantly improved mentation on 10/31. Underwent HD 11/1. Patient also with increasing hypernatremia so added FWF to tube feeds.      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 Second dose Ritux on 11/3. Next dose of cytoxan on 11/10. Will discuss with Rheum regarding maintenance dosing.      Recommendations:  - Reported large volume void on 11/11.   Hold HD and do not place permacath at this time. Hopeful for renal recovery  - continue steroid taper per rheum   - strict intake and output  - renally dose medications and avoid nephrotoxins when possible  - replete electrolytes as appropriate  "

## 2022-11-11 NOTE — PT/OT/SLP PROGRESS
Physical Therapy      Patient Name:  Kristin Goodman   MRN:  [id number]    PT to bedside for PT treatment session. Pt out of room for HD. PT to return when appropriate.

## 2022-11-11 NOTE — NURSING
I was called by Unit Nurse Vandana  - patient has had cyclophosphamide ordered for vasculitis - order observed has mesna prior and after the dose ordered - noted signed chemotherapy consent in the media page in Epic - researched chemotherapy dose for vasculitis - Guidelines for management of ANCA- associated Vasculitis with renal involvement  reports doses of 7.5 mg/Kg - 10 mg/kg  in patients 60-75 years old - this patient is 78 YO and has a order for 7.5 mg/kg - chemotherapy obtained from pharmacy with correct weight of 63 KG  - for a total of 480 mg ordered. Order to infuse over 60 minutes - instructed patients nurse to start mesna now

## 2022-11-11 NOTE — ASSESSMENT & PLAN NOTE
Patient is a 76 yo F with chronic diastolic heart failure, HTN, OA, who is admitted for respiratory failure. Rheumatology is consulted due to concern for vasculitis. Patient presented with cough and hemoptysis since 9/24/22. She was admitted due to dyspnea and hypoxia on 10/17. She has had Hb drop to 6 this admission requiring blood transfusions. Reviewed her chest imaging which shows progressive disease from 10/14 until now which can be concerning for DAH. This might also explain the Hb drop. No bronch planned for now due to her tenuous respiratory status. S/p upper endoscopy with GI 11/14. She has had increasing creatinine from baseline of 1 to 3.0 on 10/23/22.     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   3)  CHANGES SUGGESTIVE OF FOCAL ACUTE PYELONEPHRITIS.   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     Treatment to date  - s/p IV Solumedrol 1000 mg x3 doses. Now following PEXIVAS steroid taper. Currently on IV Solumedrol 20 mg daily.  - PLEX 10/26, 10/27, 10/29, 10/30, 11/1, 11/2, 11/3 (prior to Rituxan)  - Further HD per nephrology  - Rituximab 375 mg/m^2 (600 mg) on 10/27 (every 7 day dose 1 of 4), 11/3 (dose 2 of 4), 11/10 (dose 3 of 4)  - Cyclophosphamide 750 mg/m2 on 10/27 (every 14 day dose 1 of 2), 11/10 (dose 2 of 2)    Recommendations:  1. Continue steroid taper per PEXIVAS trial as in the chart below.Continue start Solu-Medrol 20 mg IV started on 11/6 for total of 14 days  2. Atovaquone 1500 mg and  Protonix daily while on high dose steroids.  3. Next Rituximab 375 mg/m^2 due 11/17  4. S/p Cyclophosphamide 10/27 and 11/10  5. Monitor CBC and CMP in 10-14 days after giving chemotherapy

## 2022-11-12 LAB
ALBUMIN SERPL BCP-MCNC: 2.3 G/DL (ref 3.5–5.2)
ALP SERPL-CCNC: 58 U/L (ref 55–135)
ALT SERPL W/O P-5'-P-CCNC: 21 U/L (ref 10–44)
ANION GAP SERPL CALC-SCNC: 9 MMOL/L (ref 8–16)
AST SERPL-CCNC: 17 U/L (ref 10–40)
B2 GLYCOPROT1 IGA SER QL: <9 SAU
B2 GLYCOPROT1 IGG SER QL: <9 SGU
B2 GLYCOPROT1 IGM SER QL: <9 SMU
BASOPHILS # BLD AUTO: 0 K/UL (ref 0–0.2)
BASOPHILS NFR BLD: 0 % (ref 0–1.9)
BILIRUB SERPL-MCNC: 0.5 MG/DL (ref 0.1–1)
BUN SERPL-MCNC: 45 MG/DL (ref 8–23)
CALCIUM SERPL-MCNC: 7.5 MG/DL (ref 8.7–10.5)
CHLORIDE SERPL-SCNC: 101 MMOL/L (ref 95–110)
CO2 SERPL-SCNC: 29 MMOL/L (ref 23–29)
CREAT SERPL-MCNC: 3.4 MG/DL (ref 0.5–1.4)
DIFFERENTIAL METHOD: ABNORMAL
EOSINOPHIL # BLD AUTO: 0 K/UL (ref 0–0.5)
EOSINOPHIL NFR BLD: 0.3 % (ref 0–8)
ERYTHROCYTE [DISTWIDTH] IN BLOOD BY AUTOMATED COUNT: 16.4 % (ref 11.5–14.5)
EST. GFR  (NO RACE VARIABLE): 13.5 ML/MIN/1.73 M^2
GLUCOSE SERPL-MCNC: 90 MG/DL (ref 70–110)
HCT VFR BLD AUTO: 22.8 % (ref 37–48.5)
HGB BLD-MCNC: 7.3 G/DL (ref 12–16)
IMM GRANULOCYTES # BLD AUTO: 0.06 K/UL (ref 0–0.04)
IMM GRANULOCYTES NFR BLD AUTO: 0.9 % (ref 0–0.5)
LYMPHOCYTES # BLD AUTO: 0.5 K/UL (ref 1–4.8)
LYMPHOCYTES NFR BLD: 7.9 % (ref 18–48)
MAGNESIUM SERPL-MCNC: 1.7 MG/DL (ref 1.6–2.6)
MCH RBC QN AUTO: 29.3 PG (ref 27–31)
MCHC RBC AUTO-ENTMCNC: 32 G/DL (ref 32–36)
MCV RBC AUTO: 92 FL (ref 82–98)
MONOCYTES # BLD AUTO: 0.4 K/UL (ref 0.3–1)
MONOCYTES NFR BLD: 5.2 % (ref 4–15)
NEUTROPHILS # BLD AUTO: 5.8 K/UL (ref 1.8–7.7)
NEUTROPHILS NFR BLD: 85.7 % (ref 38–73)
NRBC BLD-RTO: 0 /100 WBC
PHOSPHATE SERPL-MCNC: 4.4 MG/DL (ref 2.7–4.5)
PLATELET # BLD AUTO: 103 K/UL (ref 150–450)
PMV BLD AUTO: 11.5 FL (ref 9.2–12.9)
POCT GLUCOSE: 106 MG/DL (ref 70–110)
POCT GLUCOSE: 133 MG/DL (ref 70–110)
POCT GLUCOSE: 175 MG/DL (ref 70–110)
POCT GLUCOSE: 98 MG/DL (ref 70–110)
POTASSIUM SERPL-SCNC: 3.6 MMOL/L (ref 3.5–5.1)
PROT SERPL-MCNC: 4.5 G/DL (ref 6–8.4)
RBC # BLD AUTO: 2.49 M/UL (ref 4–5.4)
SODIUM SERPL-SCNC: 139 MMOL/L (ref 136–145)
WBC # BLD AUTO: 6.71 K/UL (ref 3.9–12.7)

## 2022-11-12 PROCEDURE — 85025 COMPLETE CBC W/AUTO DIFF WBC: CPT

## 2022-11-12 PROCEDURE — 94660 CPAP INITIATION&MGMT: CPT

## 2022-11-12 PROCEDURE — 94761 N-INVAS EAR/PLS OXIMETRY MLT: CPT

## 2022-11-12 PROCEDURE — 97116 GAIT TRAINING THERAPY: CPT | Mod: CQ

## 2022-11-12 PROCEDURE — C9113 INJ PANTOPRAZOLE SODIUM, VIA: HCPCS | Performed by: STUDENT IN AN ORGANIZED HEALTH CARE EDUCATION/TRAINING PROGRAM

## 2022-11-12 PROCEDURE — 25000003 PHARM REV CODE 250: Performed by: HOSPITALIST

## 2022-11-12 PROCEDURE — 25000003 PHARM REV CODE 250: Performed by: STUDENT IN AN ORGANIZED HEALTH CARE EDUCATION/TRAINING PROGRAM

## 2022-11-12 PROCEDURE — 80053 COMPREHEN METABOLIC PANEL: CPT

## 2022-11-12 PROCEDURE — 83735 ASSAY OF MAGNESIUM: CPT

## 2022-11-12 PROCEDURE — 25000003 PHARM REV CODE 250

## 2022-11-12 PROCEDURE — 27000221 HC OXYGEN, UP TO 24 HOURS

## 2022-11-12 PROCEDURE — 99900035 HC TECH TIME PER 15 MIN (STAT)

## 2022-11-12 PROCEDURE — 63600175 PHARM REV CODE 636 W HCPCS: Performed by: INTERNAL MEDICINE

## 2022-11-12 PROCEDURE — 84100 ASSAY OF PHOSPHORUS: CPT

## 2022-11-12 PROCEDURE — 36415 COLL VENOUS BLD VENIPUNCTURE: CPT

## 2022-11-12 PROCEDURE — 99232 PR SUBSEQUENT HOSPITAL CARE,LEVL II: ICD-10-PCS | Mod: ,,, | Performed by: STUDENT IN AN ORGANIZED HEALTH CARE EDUCATION/TRAINING PROGRAM

## 2022-11-12 PROCEDURE — 97112 NEUROMUSCULAR REEDUCATION: CPT | Mod: CQ

## 2022-11-12 PROCEDURE — 97530 THERAPEUTIC ACTIVITIES: CPT | Mod: CQ

## 2022-11-12 PROCEDURE — 63600175 PHARM REV CODE 636 W HCPCS: Performed by: STUDENT IN AN ORGANIZED HEALTH CARE EDUCATION/TRAINING PROGRAM

## 2022-11-12 PROCEDURE — 20600001 HC STEP DOWN PRIVATE ROOM

## 2022-11-12 PROCEDURE — 99232 SBSQ HOSP IP/OBS MODERATE 35: CPT | Mod: ,,, | Performed by: STUDENT IN AN ORGANIZED HEALTH CARE EDUCATION/TRAINING PROGRAM

## 2022-11-12 RX ADMIN — HYDRALAZINE HYDROCHLORIDE 100 MG: 50 TABLET ORAL at 01:11

## 2022-11-12 RX ADMIN — METHYLPREDNISOLONE SODIUM SUCCINATE 20 MG: 40 INJECTION, POWDER, FOR SOLUTION INTRAMUSCULAR; INTRAVENOUS at 09:11

## 2022-11-12 RX ADMIN — NYSTATIN 500000 UNITS: 500000 SUSPENSION ORAL at 09:11

## 2022-11-12 RX ADMIN — QUETIAPINE FUMARATE 25 MG: 25 TABLET ORAL at 09:11

## 2022-11-12 RX ADMIN — LEVOTHYROXINE SODIUM 25 MCG: 25 TABLET ORAL at 05:11

## 2022-11-12 RX ADMIN — LISINOPRIL 40 MG: 20 TABLET ORAL at 09:11

## 2022-11-12 RX ADMIN — HYDRALAZINE HYDROCHLORIDE 100 MG: 50 TABLET ORAL at 09:11

## 2022-11-12 RX ADMIN — PANTOPRAZOLE SODIUM 40 MG: 40 INJECTION, POWDER, FOR SOLUTION INTRAVENOUS at 09:11

## 2022-11-12 RX ADMIN — ATOVAQUONE 1500 MG: 750 SUSPENSION ORAL at 09:11

## 2022-11-12 RX ADMIN — NYSTATIN 500000 UNITS: 500000 SUSPENSION ORAL at 01:11

## 2022-11-12 RX ADMIN — CARVEDILOL 25 MG: 25 TABLET, FILM COATED ORAL at 09:11

## 2022-11-12 RX ADMIN — AMLODIPINE BESYLATE 10 MG: 10 TABLET ORAL at 09:11

## 2022-11-12 NOTE — ASSESSMENT & PLAN NOTE
As of 10/26/22 strongly positive MPO Ab (in contrast to borderline positive PR3), consistent with clinical picture of microscopic polyangiitis with pulmonary, and renal involvement after presenting with acute hypoxemic respiratory failure, hemoptysis, and acute renal failure.  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   3)  CHANGES SUGGESTIVE OF FOCAL ACUTE PYELONEPHRITIS.   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 Now following PEXIVAS steroid taper. Currently on IV Solumedrol 20 mg daily.   - plan for 20mg IV daily on 11/6 for 14 days (last dose of 20mg equivalent is 11/20/2022).   - apheresis: underwent PLEX 10/26, 10/27, 10/30, 11/1, 11/2, 11/3  - rituximab every 7 days for 4 doses: Dose #1: 10/27, #2 11/3, #3 11/10; Next Rituximab 375 mg/m^2 due Nov 17th   - cyclophosphamide every 14 days for 2 doses: 10/27, 11/10  - Opportunistic Infection ppx: atovaquone 1500mg PO daily  - GI bleed prophylaxis: pantoprazole 40mg daily

## 2022-11-12 NOTE — PT/OT/SLP PROGRESS
"Physical Therapy Treatment    Patient Name:  Kristin Goodman   MRN:  [id number]    Recommendations:     Discharge Recommendations:  nursing facility, skilled   Discharge Equipment Recommendations: bedside commode   Barriers to discharge:  Pt currently requiring increased level of assistance with mobility    Assessment:     Kristin Goodman is a 77 y.o. female admitted with a medical diagnosis of Microscopic polyangiitis.  She presents with the following impairments/functional limitations:  weakness, impaired endurance, impaired self care skills, impaired functional mobility, impaired balance, decreased upper extremity function, decreased lower extremity function, decreased safety awareness Pt was found in bathroom on toilet seat with niece present in room. Pt required 3-4 attempts to stand up with RW and walk back to the bed however c/o dizziness, lightheaded, and fear of falling. Pt was able to stand up with help from niece and walked back to the bed with unsteady slow gait without AD. Pt will cont to benefit from skilled IP Rehab to improve mobility.    Rehab Prognosis: Good; patient would benefit from acute skilled PT services to address these deficits and reach maximum level of function.    Recent Surgery: * No surgery found *      Plan:     During this hospitalization, patient to be seen 3 x/week to address the identified rehab impairments via gait training, therapeutic activities, therapeutic exercises, neuromuscular re-education and progress toward the following goals:    Plan of Care Expires:  11/30/22    Subjective     Chief Complaint: Having dizziness, lightheaded  Patient/Family Comments/goals: "Thank you"  Pain/Comfort:         Objective:     Communicated with nurse prior to session.  Patient found  toilet seat in bathroom  with   upon PT entry to room.     General Precautions: Standard, fall, aspiration   Orthopedic Precautions:N/A   Braces: N/A  Respiratory Status: Nasal cannula, flow 2 L/min     Functional " Patient was tested for covid 1 week ago because of a cough. The test was negative, but now she has new symptoms of loss of taste and feeling worse than she did last week. She thinks she needs to be retested and would like the test to be ordered. Okay for this?   Mobility:  Bed Mobility:     Rolling Left:  stand by assistance  Rolling Right: stand by assistance  Scooting: minimum assistance  Sit to Supine: stand by assistance  Transfers:     Sit to Stand:  moderate assistance and of 2 persons with no AD  Gait: 14 ft @ CGA x 2 no AD slow unsteady gait, decrease step length   Balance: Poor dynamic balance walking and fair static balance seated      AM-PAC 6 CLICK MOBILITY  Turning over in bed (including adjusting bedclothes, sheets and blankets)?: 3  Sitting down on and standing up from a chair with arms (e.g., wheelchair, bedside commode, etc.): 3  Moving from lying on back to sitting on the side of the bed?: 3  Moving to and from a bed to a chair (including a wheelchair)?: 3  Need to walk in hospital room?: 3  Climbing 3-5 steps with a railing?: 2  Basic Mobility Total Score: 17       Treatment & Education:  Pt was educated on benefits of bed mobility and repositioning to decrease occurrence of pressure ulcers.    Patient left HOB elevated with all lines intact, call button in reach, nurse notified, and niece present..    GOALS:   Multidisciplinary Problems       Physical Therapy Goals          Problem: Physical Therapy    Goal Priority Disciplines Outcome Goal Variances Interventions   Physical Therapy Goal     PT, PT/OT Ongoing, Progressing     Description: Goals to be met by: 2022     Patient will increase functional independence with mobility by performin. Supine to sit with contact guard assistance  2. Sit to supine with contact guard assistance  3. Sit to stand transfer with minimum assistance MET   3a. STS supvn LRAD  4. Gait  x 40 feet with minimum assistance using LRAD as needed  5. Lower extremity exercise program x10 reps per handout, with independence                        Time Tracking:     PT Received On: 22  PT Start Time: 1056     PT Stop Time: 1145  PT Total Time (min): 49 min     Billable Minutes: Gait Training 10, Therapeutic  Activity 22, and Neuromuscular Re-education 17    Treatment Type: Treatment  PT/PTA: PTA     PTA Visit Number: 2     11/12/2022

## 2022-11-12 NOTE — PROGRESS NOTES
[facility] - Intensive Care ([place] [id number])  [facility] Medicine  Progress Note    Patient Name: Kristin Goodman  MRN: [id number]  Patient Class: IP- Inpatient   Admission Date: 10/17/2022  Length of Stay: 26 days  Attending Physician: Danielle Palomino MD  Primary Care Provider: Lori Hernandez MD        Subjective:     Principal Problem:Microscopic polyangiitis        HPI:  Kristin Goodman is a 77 y.o. female with a past medical history of HTN, hypothyroidism, HFpEF, and osteoarthritis of the arm who has presented to the ED for cough, SOB, and weakness. Daughter is present at the bedside. Patient presented to the ED on 9/24 with hemoptysis, CT and CXR showed high suspicion for multilobar pneumonia. Patient was discharged with a 10-day course of levofloxacin and an albuterol inhaler. Patient followed up with her PCP on 10/4 with slight improvement in symptoms and went back to work. Patient continued to have worsening symptoms and presented to the ED again on 10/14 for evaluation and no interventions were done. Patient endorses fevers over the last few days up to 102.4 F with progressive SOB. She endorses hemoptysis with moderate amount of blood, generalized weakness, productive cough, SOB, and loss of appetite. Denies chest pain, nausea, vomiting, abdominal pain, or urinary changes.    ED: hypertensive up to 219/93 and tachycardic up to 117. Oxygen saturation on 92% on RA, placed on 5L NC with sats >97%. CBC remarkable for leukocytosis of 16.03 and Hb 7.7. K 3.3. Cr 1.6, baseline ~1.0. . Troponin 0.061. COVID and flu negative. EKG NSR. CT chest with contrast pending at time of admission. Given home amlodipine and lisinopril. Given 500mL NS, IV azithromycin, cefepime and vanc.       Overview/Hospital Course:  Ms. Goodman was admitted to Hospital Medicine for management of multifocal pneumonia and acute hypoxemic respiratory failure after presenting to the ED with fevers, cough, hemoptysis, and dyspnea. She required  escalation to the Medical ICU due to abrupt decline in her respiratory status, associated with hemoptysis and requiring NIPPV. CT chest showed bilateral patchy ground glass opacities. Labs additionally were notable for acute renal failure on CKD3. Pulmonology was consulted while under the care of [facility] and felt this picture was consistent with diffuse alveolar hemorrhage.     Her workup revealed a strongly positive MPO Ab consistent with clinical picture of microscopic polyangiitis with pulmonary and renal involvement. She underwent Trialysis catheter placement 10/25/2022 in the Medical ICU. 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     Rheumatology was consulted, extensive workup revealed MITUL + 1:2560 homogenous, +dsDNA, normal complements, PR3 1.2 (slight +),  (+), cANCA + 1:80, pANCA neg, GBMAb neg, trace cryos. Due to concern for MPA, she was started on pulse dose IV methylprednisolone 1000mg for 3 days, and plasmapheresis under the guidance of Transfusion Medicine. She remains on a steroid taper and has completed PLEX. She additionally received rituximab and cyclophosphamide. OI prophylaxis was provided with atovaquone due to a sulfa allergy.     Nephrology was consulted for evaluation of acute renal failure in the setting of MPA. She did require SLED in the ICU for renal clearance and remains on intermittent hemodialysis. She is expected to continue HD once discharged.     Her acute hypoxemic respiratory failure improved and she was weaned off of supplemental oxygen. She stepped down to [facility] 10/28.     She underwent MBBS for evaluation of dysphagia, which showed global delayed initiation of swallow and aspiration with thin liquids. Due to  concern for possible prior stroke, head imaging was completed and negative for acute finding. She is NPO with NG tube. ENT was consulted for evaluation of dysphagia and cervical adenopathy.  Patient did not tolerate laryngoscopy; unable to visualize vocal cords but given her lack of hoarseness, they did not suspect vocal fold paresis/paralysis. MRI recommended by rheum to fully rule out any potential neurological cause of the patient's dysphagia; but demonstrated   remote left thalamic lacunar type infarct and punctate remote left cerebellar infarcts.  She is continuing to work with speech therapy.      She is due rituximab and cyclophosphamide on 11/10.     Her other chronic medical conditions including hypothyroidism, diastolic CHF, and hypertension were managed with her home medications, with dose adjustments as needed.         Interval History:   Patient seen and examined at bedside.    No acute events overnight.  Improvement of left, lateral neck pain this morning and denies any pain when swallowing today. Thinks she made about a cupful of  urine yesterday. Further, denies any abdominal pain or nausea.      Review of Systems   Constitutional:  Positive for fatigue. Negative for chills, diaphoresis and fever.   HENT:  Positive for trouble swallowing. Negative for congestion and sore throat.    Eyes:  Negative for visual disturbance.   Respiratory:  Negative for cough, shortness of breath and wheezing.    Cardiovascular:  Negative for chest pain, palpitations and leg swelling.   Gastrointestinal:  Negative for abdominal pain, nausea and vomiting.   Genitourinary:  Positive for decreased urine volume.   Musculoskeletal:  Negative for arthralgias and myalgias.   Skin:  Negative for rash.   Neurological:  Positive for weakness. Negative for dizziness, light-headedness and headaches.   Psychiatric/Behavioral:  Negative for confusion, decreased concentration and sleep disturbance.      Objective:     Vital Signs (Most  Recent):  Temp: 99 °F (37.2 °C) (11/12/22 1150)  Pulse: 77 (11/12/22 1000)  Resp: 16 (11/12/22 1000)  BP: 133/61 (11/12/22 1150)  SpO2: 98 % (11/12/22 1000)   Vital Signs (24h Range):  Temp:  [97.5 °F (36.4 °C)-99 °F (37.2 °C)] 99 °F (37.2 °C)  Pulse:  [64-94] 77  Resp:  [13-22] 16  SpO2:  [92 %-100 %] 98 %  BP: ()/(54-77) 133/61     Weight: 63 kg (138 lb 14.2 oz)  Body mass index is 26.24 kg/m².    Intake/Output Summary (Last 24 hours) at 11/12/2022 1346  Last data filed at 11/11/2022 1820  Gross per 24 hour   Intake 900.02 ml   Output 950 ml   Net -49.98 ml        Physical Exam  Vitals and nursing note reviewed.   Constitutional:       General: She is awake.      Appearance: She is well-developed. She is ill-appearing. She is not toxic-appearing or diaphoretic.   HENT:      Head: Normocephalic and atraumatic.      Right Ear: External ear normal.      Left Ear: External ear normal.      Nose:      Comments: + NGT     Mouth/Throat:      Mouth: Mucous membranes are dry.      Comments: thrush  Eyes:      General: Lids are normal. No scleral icterus.        Right eye: No discharge.         Left eye: No discharge.   Cardiovascular:      Rate and Rhythm: Normal rate and regular rhythm.   Pulmonary:      Effort: Pulmonary effort is normal.      Breath sounds: Normal breath sounds. No wheezing or rhonchi.   Abdominal:      General: Bowel sounds are normal.      Palpations: Abdomen is soft.      Tenderness: There is no abdominal tenderness.   Musculoskeletal:         General: No deformity.      Cervical back: Normal range of motion and neck supple. Tenderness (left side near central line) present. No rigidity.      Right lower leg: No edema.      Left lower leg: No edema.   Skin:     General: Skin is warm and dry.   Neurological:      General: No focal deficit present.      Mental Status: She is alert and oriented to person, place, and time. Mental status is at baseline.   Psychiatric:         Attention and  Perception: Attention normal.         Behavior: Behavior normal.       Significant Labs: All pertinent labs within the past 24 hours have been reviewed.    Recent Results (from the past 24 hour(s))   POCT glucose    Collection Time: 11/11/22  4:31 PM   Result Value Ref Range    POCT Glucose 122 (H) 70 - 110 mg/dL   POCT glucose    Collection Time: 11/11/22  5:39 PM   Result Value Ref Range    POCT Glucose 148 (H) 70 - 110 mg/dL   POCT glucose    Collection Time: 11/11/22  6:17 PM   Result Value Ref Range    POCT Glucose 151 (H) 70 - 110 mg/dL   POCT glucose    Collection Time: 11/12/22 12:56 AM   Result Value Ref Range    POCT Glucose 98 70 - 110 mg/dL   POCT glucose    Collection Time: 11/12/22  6:09 AM   Result Value Ref Range    POCT Glucose 106 70 - 110 mg/dL   Comprehensive metabolic panel    Collection Time: 11/12/22  6:31 AM   Result Value Ref Range    Sodium 139 136 - 145 mmol/L    Potassium 3.6 3.5 - 5.1 mmol/L    Chloride 101 95 - 110 mmol/L    CO2 29 23 - 29 mmol/L    Glucose 90 70 - 110 mg/dL    BUN 45 (H) 8 - 23 mg/dL    Creatinine 3.4 (H) 0.5 - 1.4 mg/dL    Calcium 7.5 (L) 8.7 - 10.5 mg/dL    Total Protein 4.5 (L) 6.0 - 8.4 g/dL    Albumin 2.3 (L) 3.5 - 5.2 g/dL    Total Bilirubin 0.5 0.1 - 1.0 mg/dL    Alkaline Phosphatase 58 55 - 135 U/L    AST 17 10 - 40 U/L    ALT 21 10 - 44 U/L    Anion Gap 9 8 - 16 mmol/L    eGFR 13.5 (A) >60 mL/min/1.73 m^2   CBC auto differential    Collection Time: 11/12/22  6:31 AM   Result Value Ref Range    WBC 6.71 3.90 - 12.70 K/uL    RBC 2.49 (L) 4.00 - 5.40 M/uL    Hemoglobin 7.3 (L) 12.0 - 16.0 g/dL    Hematocrit 22.8 (L) 37.0 - 48.5 %    MCV 92 82 - 98 fL    MCH 29.3 27.0 - 31.0 pg    MCHC 32.0 32.0 - 36.0 g/dL    RDW 16.4 (H) 11.5 - 14.5 %    Platelets 103 (L) 150 - 450 K/uL    MPV 11.5 9.2 - 12.9 fL    Immature Granulocytes 0.9 (H) 0.0 - 0.5 %    Gran # (ANC) 5.8 1.8 - 7.7 K/uL    Immature Grans (Abs) 0.06 (H) 0.00 - 0.04 K/uL    Lymph # 0.5 (L) 1.0 - 4.8 K/uL     Mono # 0.4 0.3 - 1.0 K/uL    Eos # 0.0 0.0 - 0.5 K/uL    Baso # 0.00 0.00 - 0.20 K/uL    nRBC 0 0 /100 WBC    Gran % 85.7 (H) 38.0 - 73.0 %    Lymph % 7.9 (L) 18.0 - 48.0 %    Mono % 5.2 4.0 - 15.0 %    Eosinophil % 0.3 0.0 - 8.0 %    Basophil % 0.0 0.0 - 1.9 %    Differential Method Automated    Magnesium    Collection Time: 11/12/22  6:31 AM   Result Value Ref Range    Magnesium 1.7 1.6 - 2.6 mg/dL   Phosphorus    Collection Time: 11/12/22  6:31 AM   Result Value Ref Range    Phosphorus 4.4 2.7 - 4.5 mg/dL   POCT glucose    Collection Time: 11/12/22 11:45 AM   Result Value Ref Range    POCT Glucose 133 (H) 70 - 110 mg/dL         Significant Imaging: I have reviewed all pertinent imaging results/findings within the past 24 hours.    Imaging Results               CT Chest With Contrast (Final result)  Result time 10/17/22 14:26:36      Final result by Lacy Camp MD (10/17/22 14:26:36)                   Impression:      Interval progression of bilateral perihilar dense consolidation with peripheral patchy areas of ground-glass opacification nonspecific as to the etiology.  Bilateral pneumonia as well as inflammatory etiologies considered.  Cardiogenic pulmonary edema considered less likely given the pattern.    Borderline sized mediastinal lymph node appears stable.    No other interval detrimental changes since 10/14/2022.    This report was flagged in Epic as abnormal.      Electronically signed by: Lacy Camp  Date:    10/17/2022  Time:    14:26               Narrative:    EXAMINATION:  CT CHEST WITH CONTRAST    CLINICAL HISTORY:  Aspiration;    TECHNIQUE:  Low dose axial images, sagittal and coronal reformations were obtained from the thoracic inlet to the lung bases following the IV administration of 75 mL of Omnipaque 350.    COMPARISON:  CT chest without contrast 10/14/2022, CTA chest 09/24/2022    FINDINGS:  Base of Neck: No significant abnormality.    Thoracic soft tissues:  Unremarkable.    Aorta: Left-sided aortic arch.  No aneurysm and no significant atherosclerosis.    Heart: Normal in size.  There is no convincing pericardial effusion with prior trace effusion no longer apparent.    Pulmonary vasculature: Pulmonary arteries distribute normally with no visible significant central pulmonary thromboemboli.  Pulmonary artery caliber is normal.    Essence/Mediastinum: 9 mm in short axis lymph node in the AP window again noted.  No convincing new adenopathy.  Axillary shotty lymph nodes with fatty essence appear stable.    Airways: Patent.    Lungs/Pleura: Perihilar alveolar opacities with ground-glass component appear more prominent bilaterally as compared to the previous examination 10/14/2022 with dense consolidation centered in the perihilar region.  More patchy ground-glass opacities extend to the periphery.  There is no acute pleural effusion.    Esophagus: Unremarkable.    Upper Abdomen: Multiple low densities are again noted in the liver left lobe too small to characterize but likely cysts largest measuring 8 mm axial image 93 series 2.  There is no new abnormality of the partially imaged upper abdomen.    Bones: There is no acute fracture or suspicious lytic or sclerotic lesion involving the imaged chest wall osseous structures.  There is spondylosis and kyphosis of the imaged spine.  No subluxation.                                          Assessment/Plan:      * Microscopic polyangiitis  As of 10/26/22 strongly positive MPO Ab (in contrast to borderline positive PR3), consistent with clinical picture of microscopic polyangiitis with pulmonary, and renal involvement after presenting with acute hypoxemic respiratory failure, hemoptysis, and acute renal failure.  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   3)  CHANGES SUGGESTIVE OF FOCAL ACUTE PYELONEPHRITIS.   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 Now following PEXIVAS steroid taper. Currently on IV Solumedrol 20 mg daily.   - plan for 20mg IV daily on 11/6 for 14 days (last dose of 20mg equivalent is 11/20/2022).   - apheresis: underwent PLEX 10/26, 10/27, 10/30, 11/1, 11/2, 11/3  - rituximab every 7 days for 4 doses: Dose #1: 10/27, #2 11/3, #3 11/10; Next Rituximab 375 mg/m^2 due Nov 17th   - cyclophosphamide every 14 days for 2 doses: 10/27, 11/10  - Opportunistic Infection ppx: atovaquone 1500mg PO daily  - GI bleed prophylaxis: pantoprazole 40mg daily     Gastric reflux  S/p GI cocktail  PPI BID  Elevated HOB; feeds changed to bolus  Symptoms improved      Gastrointestinal hemorrhage with melena  Starting having hemoptysis and melena with associated acute blood loss anemia earlier in hospital stay. Patient with known internal hemorrhoids, mild sigmoid diverticulosis, history of gastritis and + H pylori (2012 EGD).   - GI consulted 10/19, unable to perform EGD due to instability and respiratory failure at the time    Plan  - patient unable to take PO PPI due to NGT removal on 11/5, transition to IV PPI 40mg pantoprazole daily, return to per NG tube once replaced  -PPI transitioned to BID as concern for GERD  - Monitor CBC  closely for signs of recurrent bleed          Dysphagia  SLP following  MBSS showing global weakness in swallowing as well as aspiration with thin liquids.   ENT consulted but patient did not tolerate laryngoscopy     Plan   - Discussed case with Rheumatology team, they are concerned that this dysphagia may have been from prior stroke, requesting imaging for evaluation-  CT demonstrated no acute findings or causes for dysphagia NOR did MRI   - removed NGT and place dobhoff which is more comfortable and makes swallowing easier compared to NGT.    - continue working with speech therapy   -exam concerning for thrush; nystatin swish and swallow initiated; GI consulted as concern that oropharyngeal candidiasis may be contributing to dysphagia  -Per GI, Lower suspicion for esophageal candidiasis without odynophagia however can If white plaques have been seen on oropharynx, ok to start fluconazole empirically for possible esophageal candidiasis  -tentatively plan for EGD on 11/12 if persistent dysphagia    Moderate malnutrition  Nutrition consulted. Most recent weight and BMI monitored-          Malnutrition (Moderate to Severe)  Weight Loss (Malnutrition): 7.5% in 3 months              Measurements:  Wt Readings from Last 1 Encounters:   11/09/22 63 kg (138 lb 14.2 oz)   Body mass index is 26.24 kg/m².    Recommendations: Recommendation/Intervention: 1. As tolerated, increase TF rate (of Novasource) to 35 mL/hr  - 2. RD to monitor & follow-up.  Goals: Meet % EEN, EPN by RD f/u date    Patient has been screened and assessed by RD. RD will follow patient.      WILFREDO (acute kidney injury)  Due to microscopic polyangiitis. Remains on hemodialysis intermittently.   - Baseline creatinine 1.0-1.2.   - New onset proteinuria/hematuria.   - Renal biopsy completed and   - Trialysis in place for intermittent Hemodialysis    Plan  - Nephrology consulted, appreciate management  - management of MPA as noted separately  - Renal  function panel daily  - renally dose all medications  - intermittent Hemodialysis as indicated, per Nephrology     Acute hypoxemic respiratory failure  Multifocal pneumonia  Hemoptysis  Dyspnea  Diffuse Alveolar Hemorrahge  In the setting of a new diagnosis of microscopic polyangiitis, presented with fevers, dyspnea,.  - Chest imaging was consistent with diffuse alveolar hemorrhage.  CT chest wc 10/17 with bl perihilar dense consolidations w/ peripheral patcy areas of GGO, BL PNA, less c/f cardiogenic pulmonary edema.  - Patient upgraded to Medical ICU 10/23/22 with abrupt respiratory decline requiring NIPPV, improved with high dose IV steroids, plasmapheresis, emergent hemodialysis.   - Unable to perform bronchoscopy in the Medical ICU due to tenuous respiratory status  - As of 11/6, hypoxemia has significantly improved    Plan:  - weaned to room air  - continue management of underlying triggers with steroid and immunosuppressants  - incentive spirometry and respiratory hygiene    Lymphadenopathy of head and neck  - Has bilateral neck gland swelling with tenderness,consulted ENT  - No surgical intervention indicated   - Adenopathy likely reactive in nature   - Recommend further workup if it does not resolve within next 2 weeks     Acute blood loss anemia  In the setting of GI bleed with melena  - Evaluated by GI, see GI bleed documentation  - Last transfusion 10/28, Hgb slowly trending down since then  - Hgb 6.9 on 11/5      Plan  - Monitor CBC Daily   - Treat with pRBC transfusion PRN Hgb <7  - qualifies for transfusion on 11/5 with Hgb 6.9, 1u pRBC ordered  -stable       Chronic diastolic heart failure  Pulmonary Hypertension due to left heart disease  TTE (10/2022) EF 65%, G1DD  Home meds: Lisinopril, Toprol     No signs or symptoms to suggest acute exacerbation    Plan  - Active volume control with intermittent Hemodialysis per Nephrology   - Daily weights (standing if tolerated)  - Strict I/Os  - Fluid  restriction 1.5 day  - Cardiac diet w/ 2 g Na restriction      Primary hypertension  BP Readings from Last 1 Encounters:   11/11/22 139/64     - Patient is unable to tolerate PO mediations, all meds to be administered via NG tube  -Will continue to monitor blood pressure trend closely and adjust antihypertensive regimen as clinically indicated and tolerated.    amLODIPine tablet 10 mg, 10 mg, Per NG tube, Daily    carvediloL tablet 12.5 mg, 12.5 mg, Per NG tube, BID    hydrALAZINE tablet 25 mg, 25 mg, Per NG tube, Q8H    lisinopriL tablet 40 mg, 40 mg, Per NG tube, Daily      Hypothyroid  - Continue synthroid 25 mcg  - TSH 0.585 10/27/22      VTE Risk Mitigation (From admission, onward)         Ordered     heparin, porcine (PF) 100 unit/mL injection flush 500 Units  As needed (PRN)         11/10/22 1430     heparin (porcine) injection 1,000 Units  As needed (PRN)         11/09/22 1601     heparin (porcine) injection 1,000 Units  As needed (PRN)         11/08/22 0854     Place sequential compression device  Until discontinued         10/25/22 1731     IP VTE HIGH RISK PATIENT  Once         10/17/22 1354     Reason for No Pharmacological VTE Prophylaxis  Once        Question:  Reasons:  Answer:  Risk of Bleeding    10/17/22 1354                Discharge Planning   ANTHONY: 11/16/2022     Code Status: Full Code   Is the patient medically ready for discharge?: No    Reason for patient still in hospital (select all that apply): Patient trending condition, Treatment, Consult recommendations and Pending disposition  Discharge Plan A: Home with family                  Danielle Palomino MD  Department of Hospital Medicine   [facility] - Intensive Care ([place])

## 2022-11-12 NOTE — PROGRESS NOTES
11/11/22 1421 11/11/22 1422   Vital Signs   Pulse 66 70   SpO2 99 % 99 %   BP (!) 85/54 110/61   MAP (mmHg) 65 80   During Hemodialysis Assessment   Intra-Hemodialysis Comments Patient with symptomatic hypotension. Difficult to arouse upon assessment. UF stopped and administered 300 ml NS bolus. Quick improvement in arousal and blood pressure obtained with interventions. Dr. Puente aware.  --    Post-Hemodialysis Assessment   Additional Fluid Intake (mL) 300 mL  (NS bolus)  --

## 2022-11-12 NOTE — PROGRESS NOTES
11/11/22 1430   Vital Signs   Pulse 83   SpO2 100 %   /65   MAP (mmHg) 83   During Hemodialysis Assessment   Blood Flow Rate (mL/min) 300 mL/min   Dialysate Flow Rate (mL/min) 800 ml/min   Ultrafiltration Rate (mL/Hr) 500 mL/Hr   Arteriovenous Lines Secure Yes   Arterial Pressure (mmHg) -160 mmHg   Venous Pressure (mmHg) 200   TMP 40   Venous Line in Air Detector Yes   Access Visible Yes   Intra-Hemodialysis Comments Patient with multiple episodes of vomiting.  Tube feeds stopped and zofran administered. Patient's primary doctor notified and okay to hold tube feeds through remainder of treatment.

## 2022-11-12 NOTE — SUBJECTIVE & OBJECTIVE
Interval History:   Patient seen and examined at bedside.    No acute events overnight.  Improvement of left, lateral neck pain this morning and denies any pain when swallowing today. Thinks she made about a cupful of  urine yesterday. Further, denies any abdominal pain or nausea.      Review of Systems   Constitutional:  Positive for fatigue. Negative for chills, diaphoresis and fever.   HENT:  Positive for trouble swallowing. Negative for congestion and sore throat.    Eyes:  Negative for visual disturbance.   Respiratory:  Negative for cough, shortness of breath and wheezing.    Cardiovascular:  Negative for chest pain, palpitations and leg swelling.   Gastrointestinal:  Negative for abdominal pain, nausea and vomiting.   Genitourinary:  Positive for decreased urine volume.   Musculoskeletal:  Negative for arthralgias and myalgias.   Skin:  Negative for rash.   Neurological:  Positive for weakness. Negative for dizziness, light-headedness and headaches.   Psychiatric/Behavioral:  Negative for confusion, decreased concentration and sleep disturbance.      Objective:     Vital Signs (Most Recent):  Temp: 99 °F (37.2 °C) (11/12/22 1150)  Pulse: 77 (11/12/22 1000)  Resp: 16 (11/12/22 1000)  BP: 133/61 (11/12/22 1150)  SpO2: 98 % (11/12/22 1000)   Vital Signs (24h Range):  Temp:  [97.5 °F (36.4 °C)-99 °F (37.2 °C)] 99 °F (37.2 °C)  Pulse:  [64-94] 77  Resp:  [13-22] 16  SpO2:  [92 %-100 %] 98 %  BP: ()/(54-77) 133/61     Weight: 63 kg (138 lb 14.2 oz)  Body mass index is 26.24 kg/m².    Intake/Output Summary (Last 24 hours) at 11/12/2022 1346  Last data filed at 11/11/2022 1820  Gross per 24 hour   Intake 900.02 ml   Output 950 ml   Net -49.98 ml        Physical Exam  Vitals and nursing note reviewed.   Constitutional:       General: She is awake.      Appearance: She is well-developed. She is ill-appearing. She is not toxic-appearing or diaphoretic.   HENT:      Head: Normocephalic and atraumatic.      Right  Ear: External ear normal.      Left Ear: External ear normal.      Nose:      Comments: + NGT     Mouth/Throat:      Mouth: Mucous membranes are dry.      Comments: thrush  Eyes:      General: Lids are normal. No scleral icterus.        Right eye: No discharge.         Left eye: No discharge.   Cardiovascular:      Rate and Rhythm: Normal rate and regular rhythm.   Pulmonary:      Effort: Pulmonary effort is normal.      Breath sounds: Normal breath sounds. No wheezing or rhonchi.   Abdominal:      General: Bowel sounds are normal.      Palpations: Abdomen is soft.      Tenderness: There is no abdominal tenderness.   Musculoskeletal:         General: No deformity.      Cervical back: Normal range of motion and neck supple. Tenderness (left side near central line) present. No rigidity.      Right lower leg: No edema.      Left lower leg: No edema.   Skin:     General: Skin is warm and dry.   Neurological:      General: No focal deficit present.      Mental Status: She is alert and oriented to person, place, and time. Mental status is at baseline.   Psychiatric:         Attention and Perception: Attention normal.         Behavior: Behavior normal.       Significant Labs: All pertinent labs within the past 24 hours have been reviewed.    Recent Results (from the past 24 hour(s))   POCT glucose    Collection Time: 11/11/22  4:31 PM   Result Value Ref Range    POCT Glucose 122 (H) 70 - 110 mg/dL   POCT glucose    Collection Time: 11/11/22  5:39 PM   Result Value Ref Range    POCT Glucose 148 (H) 70 - 110 mg/dL   POCT glucose    Collection Time: 11/11/22  6:17 PM   Result Value Ref Range    POCT Glucose 151 (H) 70 - 110 mg/dL   POCT glucose    Collection Time: 11/12/22 12:56 AM   Result Value Ref Range    POCT Glucose 98 70 - 110 mg/dL   POCT glucose    Collection Time: 11/12/22  6:09 AM   Result Value Ref Range    POCT Glucose 106 70 - 110 mg/dL   Comprehensive metabolic panel    Collection Time: 11/12/22  6:31 AM    Result Value Ref Range    Sodium 139 136 - 145 mmol/L    Potassium 3.6 3.5 - 5.1 mmol/L    Chloride 101 95 - 110 mmol/L    CO2 29 23 - 29 mmol/L    Glucose 90 70 - 110 mg/dL    BUN 45 (H) 8 - 23 mg/dL    Creatinine 3.4 (H) 0.5 - 1.4 mg/dL    Calcium 7.5 (L) 8.7 - 10.5 mg/dL    Total Protein 4.5 (L) 6.0 - 8.4 g/dL    Albumin 2.3 (L) 3.5 - 5.2 g/dL    Total Bilirubin 0.5 0.1 - 1.0 mg/dL    Alkaline Phosphatase 58 55 - 135 U/L    AST 17 10 - 40 U/L    ALT 21 10 - 44 U/L    Anion Gap 9 8 - 16 mmol/L    eGFR 13.5 (A) >60 mL/min/1.73 m^2   CBC auto differential    Collection Time: 11/12/22  6:31 AM   Result Value Ref Range    WBC 6.71 3.90 - 12.70 K/uL    RBC 2.49 (L) 4.00 - 5.40 M/uL    Hemoglobin 7.3 (L) 12.0 - 16.0 g/dL    Hematocrit 22.8 (L) 37.0 - 48.5 %    MCV 92 82 - 98 fL    MCH 29.3 27.0 - 31.0 pg    MCHC 32.0 32.0 - 36.0 g/dL    RDW 16.4 (H) 11.5 - 14.5 %    Platelets 103 (L) 150 - 450 K/uL    MPV 11.5 9.2 - 12.9 fL    Immature Granulocytes 0.9 (H) 0.0 - 0.5 %    Gran # (ANC) 5.8 1.8 - 7.7 K/uL    Immature Grans (Abs) 0.06 (H) 0.00 - 0.04 K/uL    Lymph # 0.5 (L) 1.0 - 4.8 K/uL    Mono # 0.4 0.3 - 1.0 K/uL    Eos # 0.0 0.0 - 0.5 K/uL    Baso # 0.00 0.00 - 0.20 K/uL    nRBC 0 0 /100 WBC    Gran % 85.7 (H) 38.0 - 73.0 %    Lymph % 7.9 (L) 18.0 - 48.0 %    Mono % 5.2 4.0 - 15.0 %    Eosinophil % 0.3 0.0 - 8.0 %    Basophil % 0.0 0.0 - 1.9 %    Differential Method Automated    Magnesium    Collection Time: 11/12/22  6:31 AM   Result Value Ref Range    Magnesium 1.7 1.6 - 2.6 mg/dL   Phosphorus    Collection Time: 11/12/22  6:31 AM   Result Value Ref Range    Phosphorus 4.4 2.7 - 4.5 mg/dL   POCT glucose    Collection Time: 11/12/22 11:45 AM   Result Value Ref Range    POCT Glucose 133 (H) 70 - 110 mg/dL         Significant Imaging: I have reviewed all pertinent imaging results/findings within the past 24 hours.    Imaging Results               CT Chest With Contrast (Final result)  Result time 10/17/22 14:26:36       Final result by Lacy Camp MD (10/17/22 14:26:36)                   Impression:      Interval progression of bilateral perihilar dense consolidation with peripheral patchy areas of ground-glass opacification nonspecific as to the etiology.  Bilateral pneumonia as well as inflammatory etiologies considered.  Cardiogenic pulmonary edema considered less likely given the pattern.    Borderline sized mediastinal lymph node appears stable.    No other interval detrimental changes since 10/14/2022.    This report was flagged in Epic as abnormal.      Electronically signed by: Lacy Camp  Date:    10/17/2022  Time:    14:26               Narrative:    EXAMINATION:  CT CHEST WITH CONTRAST    CLINICAL HISTORY:  Aspiration;    TECHNIQUE:  Low dose axial images, sagittal and coronal reformations were obtained from the thoracic inlet to the lung bases following the IV administration of 75 mL of Omnipaque 350.    COMPARISON:  CT chest without contrast 10/14/2022, CTA chest 09/24/2022    FINDINGS:  Base of Neck: No significant abnormality.    Thoracic soft tissues: Unremarkable.    Aorta: Left-sided aortic arch.  No aneurysm and no significant atherosclerosis.    Heart: Normal in size.  There is no convincing pericardial effusion with prior trace effusion no longer apparent.    Pulmonary vasculature: Pulmonary arteries distribute normally with no visible significant central pulmonary thromboemboli.  Pulmonary artery caliber is normal.    Essence/Mediastinum: 9 mm in short axis lymph node in the AP window again noted.  No convincing new adenopathy.  Axillary shotty lymph nodes with fatty essence appear stable.    Airways: Patent.    Lungs/Pleura: Perihilar alveolar opacities with ground-glass component appear more prominent bilaterally as compared to the previous examination 10/14/2022 with dense consolidation centered in the perihilar region.  More patchy ground-glass opacities extend to the periphery.  There is no  acute pleural effusion.    Esophagus: Unremarkable.    Upper Abdomen: Multiple low densities are again noted in the liver left lobe too small to characterize but likely cysts largest measuring 8 mm axial image 93 series 2.  There is no new abnormality of the partially imaged upper abdomen.    Bones: There is no acute fracture or suspicious lytic or sclerotic lesion involving the imaged chest wall osseous structures.  There is spondylosis and kyphosis of the imaged spine.  No subluxation.

## 2022-11-12 NOTE — PROGRESS NOTES
Hemodialysis treatment completed.    Treatment time received: 3 hours    Net fluid removed: 0    Medications received: Zofran for nausea.      Refer to previous notes for symptomatic hypotension during treatment. Continuing to hold feeds. Patient feels better and remains asymptomatic since interventions.     Heparin Locked ports per order.    Report given as documented in PER Handoff on Doc Flowsheet  Refer to flowsheet and MAR for details.  Patient transported back in stretcher from Dialysis to primary unit.

## 2022-11-12 NOTE — NURSING
Pt A&Ox4, on room air, vss. Pt hydralazine held due to hypotension on day shift. Pt tube feed switched to boluses to start at 0800, NG currently clamped. Pt blood sugar monitored over night, stable. Pt denied pain or nausea this shift. Will continue to monitor call light in reach. Bed in low position. Bed alarm on. No significant events this shift.

## 2022-11-13 LAB
ALBUMIN SERPL BCP-MCNC: 2.4 G/DL (ref 3.5–5.2)
ALP SERPL-CCNC: 60 U/L (ref 55–135)
ALT SERPL W/O P-5'-P-CCNC: 19 U/L (ref 10–44)
ANION GAP SERPL CALC-SCNC: 13 MMOL/L (ref 8–16)
AST SERPL-CCNC: 16 U/L (ref 10–40)
BASOPHILS # BLD AUTO: 0 K/UL (ref 0–0.2)
BASOPHILS NFR BLD: 0 % (ref 0–1.9)
BILIRUB SERPL-MCNC: 0.6 MG/DL (ref 0.1–1)
BUN SERPL-MCNC: 56 MG/DL (ref 8–23)
CALCIUM SERPL-MCNC: 7.5 MG/DL (ref 8.7–10.5)
CHLORIDE SERPL-SCNC: 97 MMOL/L (ref 95–110)
CO2 SERPL-SCNC: 28 MMOL/L (ref 23–29)
CREAT SERPL-MCNC: 4.6 MG/DL (ref 0.5–1.4)
DIFFERENTIAL METHOD: ABNORMAL
EOSINOPHIL # BLD AUTO: 0 K/UL (ref 0–0.5)
EOSINOPHIL NFR BLD: 0.1 % (ref 0–8)
ERYTHROCYTE [DISTWIDTH] IN BLOOD BY AUTOMATED COUNT: 16.2 % (ref 11.5–14.5)
EST. GFR  (NO RACE VARIABLE): 9.4 ML/MIN/1.73 M^2
GLUCOSE SERPL-MCNC: 88 MG/DL (ref 70–110)
HCT VFR BLD AUTO: 23.3 % (ref 37–48.5)
HGB BLD-MCNC: 7.5 G/DL (ref 12–16)
IMM GRANULOCYTES # BLD AUTO: 0.02 K/UL (ref 0–0.04)
IMM GRANULOCYTES NFR BLD AUTO: 0.3 % (ref 0–0.5)
LYMPHOCYTES # BLD AUTO: 0.7 K/UL (ref 1–4.8)
LYMPHOCYTES NFR BLD: 10.7 % (ref 18–48)
MAGNESIUM SERPL-MCNC: 1.8 MG/DL (ref 1.6–2.6)
MCH RBC QN AUTO: 29.2 PG (ref 27–31)
MCHC RBC AUTO-ENTMCNC: 32.2 G/DL (ref 32–36)
MCV RBC AUTO: 91 FL (ref 82–98)
MONOCYTES # BLD AUTO: 0.5 K/UL (ref 0.3–1)
MONOCYTES NFR BLD: 6.7 % (ref 4–15)
NEUTROPHILS # BLD AUTO: 5.6 K/UL (ref 1.8–7.7)
NEUTROPHILS NFR BLD: 82.2 % (ref 38–73)
NRBC BLD-RTO: 0 /100 WBC
PHOSPHATE SERPL-MCNC: 4.9 MG/DL (ref 2.7–4.5)
PLATELET # BLD AUTO: 114 K/UL (ref 150–450)
PMV BLD AUTO: 10.9 FL (ref 9.2–12.9)
POCT GLUCOSE: 103 MG/DL (ref 70–110)
POCT GLUCOSE: 122 MG/DL (ref 70–110)
POCT GLUCOSE: 150 MG/DL (ref 70–110)
POCT GLUCOSE: 90 MG/DL (ref 70–110)
POCT GLUCOSE: 97 MG/DL (ref 70–110)
POTASSIUM SERPL-SCNC: 3.9 MMOL/L (ref 3.5–5.1)
PROT SERPL-MCNC: 4.6 G/DL (ref 6–8.4)
RBC # BLD AUTO: 2.57 M/UL (ref 4–5.4)
SODIUM SERPL-SCNC: 138 MMOL/L (ref 136–145)
WBC # BLD AUTO: 6.76 K/UL (ref 3.9–12.7)

## 2022-11-13 PROCEDURE — 36415 COLL VENOUS BLD VENIPUNCTURE: CPT

## 2022-11-13 PROCEDURE — 83735 ASSAY OF MAGNESIUM: CPT

## 2022-11-13 PROCEDURE — 94761 N-INVAS EAR/PLS OXIMETRY MLT: CPT

## 2022-11-13 PROCEDURE — 25000003 PHARM REV CODE 250

## 2022-11-13 PROCEDURE — 63600175 PHARM REV CODE 636 W HCPCS: Performed by: INTERNAL MEDICINE

## 2022-11-13 PROCEDURE — 20600001 HC STEP DOWN PRIVATE ROOM

## 2022-11-13 PROCEDURE — 63600175 PHARM REV CODE 636 W HCPCS: Performed by: STUDENT IN AN ORGANIZED HEALTH CARE EDUCATION/TRAINING PROGRAM

## 2022-11-13 PROCEDURE — 99232 SBSQ HOSP IP/OBS MODERATE 35: CPT | Mod: ,,, | Performed by: STUDENT IN AN ORGANIZED HEALTH CARE EDUCATION/TRAINING PROGRAM

## 2022-11-13 PROCEDURE — 25000003 PHARM REV CODE 250: Performed by: STUDENT IN AN ORGANIZED HEALTH CARE EDUCATION/TRAINING PROGRAM

## 2022-11-13 PROCEDURE — C9113 INJ PANTOPRAZOLE SODIUM, VIA: HCPCS | Performed by: STUDENT IN AN ORGANIZED HEALTH CARE EDUCATION/TRAINING PROGRAM

## 2022-11-13 PROCEDURE — 97530 THERAPEUTIC ACTIVITIES: CPT | Mod: CQ

## 2022-11-13 PROCEDURE — 99232 PR SUBSEQUENT HOSPITAL CARE,LEVL II: ICD-10-PCS | Mod: ,,, | Performed by: STUDENT IN AN ORGANIZED HEALTH CARE EDUCATION/TRAINING PROGRAM

## 2022-11-13 PROCEDURE — 25000003 PHARM REV CODE 250: Performed by: HOSPITALIST

## 2022-11-13 PROCEDURE — 84100 ASSAY OF PHOSPHORUS: CPT

## 2022-11-13 PROCEDURE — 97110 THERAPEUTIC EXERCISES: CPT | Mod: CQ

## 2022-11-13 PROCEDURE — 80053 COMPREHEN METABOLIC PANEL: CPT

## 2022-11-13 PROCEDURE — 99900035 HC TECH TIME PER 15 MIN (STAT)

## 2022-11-13 PROCEDURE — 85025 COMPLETE CBC W/AUTO DIFF WBC: CPT

## 2022-11-13 RX ORDER — LIDOCAINE HYDROCHLORIDE 20 MG/ML
15 SOLUTION OROPHARYNGEAL ONCE
Status: COMPLETED | OUTPATIENT
Start: 2022-11-13 | End: 2022-11-13

## 2022-11-13 RX ORDER — MAG HYDROX/ALUMINUM HYD/SIMETH 200-200-20
30 SUSPENSION, ORAL (FINAL DOSE FORM) ORAL ONCE
Status: COMPLETED | OUTPATIENT
Start: 2022-11-13 | End: 2022-11-13

## 2022-11-13 RX ADMIN — PANTOPRAZOLE SODIUM 40 MG: 40 INJECTION, POWDER, FOR SOLUTION INTRAVENOUS at 09:11

## 2022-11-13 RX ADMIN — METHYLPREDNISOLONE SODIUM SUCCINATE 20 MG: 40 INJECTION, POWDER, FOR SOLUTION INTRAMUSCULAR; INTRAVENOUS at 09:11

## 2022-11-13 RX ADMIN — ATOVAQUONE 1500 MG: 750 SUSPENSION ORAL at 09:11

## 2022-11-13 RX ADMIN — LISINOPRIL 40 MG: 20 TABLET ORAL at 09:11

## 2022-11-13 RX ADMIN — NYSTATIN 500000 UNITS: 500000 SUSPENSION ORAL at 09:11

## 2022-11-13 RX ADMIN — AMLODIPINE BESYLATE 10 MG: 10 TABLET ORAL at 09:11

## 2022-11-13 RX ADMIN — CARVEDILOL 25 MG: 25 TABLET, FILM COATED ORAL at 09:11

## 2022-11-13 RX ADMIN — QUETIAPINE FUMARATE 25 MG: 25 TABLET ORAL at 09:11

## 2022-11-13 RX ADMIN — HYDRALAZINE HYDROCHLORIDE 100 MG: 50 TABLET ORAL at 02:11

## 2022-11-13 RX ADMIN — ALUMINUM HYDROXIDE, MAGNESIUM HYDROXIDE, AND SIMETHICONE 30 ML: 200; 200; 20 SUSPENSION ORAL at 11:11

## 2022-11-13 RX ADMIN — HYDRALAZINE HYDROCHLORIDE 100 MG: 50 TABLET ORAL at 09:11

## 2022-11-13 RX ADMIN — LIDOCAINE HYDROCHLORIDE 15 ML: 20 SOLUTION ORAL; TOPICAL at 11:11

## 2022-11-13 RX ADMIN — HYDRALAZINE HYDROCHLORIDE 100 MG: 50 TABLET ORAL at 05:11

## 2022-11-13 RX ADMIN — LEVOTHYROXINE SODIUM 25 MCG: 25 TABLET ORAL at 05:11

## 2022-11-13 NOTE — PLAN OF CARE
Plan of care reviewed with pt. Pt aaox3, disorientated to time. Ng tube in place to right nare. Bolus tube feeds given. Pt did not complain of any abd discomfort. VSS. Pt remained free of falls, trauma, and injury. Will continue to monitor.

## 2022-11-13 NOTE — NURSING
Pt A&Ox4, on room air, pt tolerating boluses. Pt had loose BM this shift. Pt denied pain or nausea this shift. Vss. No significant events this shift. Bed in low position. Call light in reach.

## 2022-11-13 NOTE — SUBJECTIVE & OBJECTIVE
Interval History:   Patient seen and examined at bedside.    No acute events overnight.  However, notes significant epigastric burning this morning similar to a few days ago.  Denies any nausea, vomiting, abdominal pain.  Notes no left, lateral neck pain or odynophagia today.      Review of Systems   Constitutional:  Positive for fatigue. Negative for chills, diaphoresis and fever.   HENT:  Positive for trouble swallowing. Negative for congestion and sore throat.    Eyes:  Negative for visual disturbance.   Respiratory:  Negative for cough, shortness of breath and wheezing.    Cardiovascular:  Negative for chest pain, palpitations and leg swelling.   Gastrointestinal:  Negative for abdominal pain, nausea and vomiting.   Genitourinary:  Positive for decreased urine volume.   Musculoskeletal:  Negative for arthralgias and myalgias.   Skin:  Negative for rash.   Neurological:  Positive for weakness. Negative for dizziness, light-headedness and headaches.   Psychiatric/Behavioral:  Negative for confusion, decreased concentration and sleep disturbance.      Objective:     Vital Signs (Most Recent):  Temp: 98.8 °F (37.1 °C) (11/13/22 1148)  Pulse: 76 (11/13/22 1148)  Resp: 17 (11/13/22 1148)  BP: 130/60 (11/13/22 1148)  SpO2: 95 % (11/13/22 1148)   Vital Signs (24h Range):  Temp:  [97.7 °F (36.5 °C)-99.1 °F (37.3 °C)] 98.8 °F (37.1 °C)  Pulse:  [65-82] 76  Resp:  [13-21] 17  SpO2:  [94 %-99 %] 95 %  BP: (110-146)/(54-68) 130/60     Weight: 63 kg (138 lb 14.2 oz)  Body mass index is 26.24 kg/m².    Intake/Output Summary (Last 24 hours) at 11/13/2022 1316  Last data filed at 11/12/2022 2000  Gross per 24 hour   Intake 400 ml   Output --   Net 400 ml        Physical Exam  Vitals and nursing note reviewed.   Constitutional:       General: She is awake.      Appearance: She is well-developed. She is ill-appearing. She is not toxic-appearing or diaphoretic.   HENT:      Head: Normocephalic and atraumatic.      Right Ear:  External ear normal.      Left Ear: External ear normal.      Nose:      Comments: + NGT     Mouth/Throat:      Mouth: Mucous membranes are dry.      Comments: Thrush improved  Eyes:      General: Lids are normal. No scleral icterus.        Right eye: No discharge.         Left eye: No discharge.   Cardiovascular:      Rate and Rhythm: Normal rate and regular rhythm.   Pulmonary:      Effort: Pulmonary effort is normal.      Breath sounds: Normal breath sounds. No wheezing or rhonchi.   Abdominal:      General: Bowel sounds are normal.      Palpations: Abdomen is soft.      Tenderness: There is no abdominal tenderness.   Musculoskeletal:         General: No deformity.      Cervical back: Normal range of motion and neck supple. No rigidity or tenderness.      Right lower leg: No edema.      Left lower leg: No edema.   Skin:     General: Skin is warm and dry.   Neurological:      General: No focal deficit present.      Mental Status: She is alert and oriented to person, place, and time. Mental status is at baseline.   Psychiatric:         Attention and Perception: Attention normal.         Behavior: Behavior normal.       Significant Labs: All pertinent labs within the past 24 hours have been reviewed.    Recent Results (from the past 24 hour(s))   POCT glucose    Collection Time: 11/12/22  6:23 PM   Result Value Ref Range    POCT Glucose 175 (H) 70 - 110 mg/dL   POCT glucose    Collection Time: 11/13/22 12:35 AM   Result Value Ref Range    POCT Glucose 97 70 - 110 mg/dL   Comprehensive metabolic panel    Collection Time: 11/13/22  2:33 AM   Result Value Ref Range    Sodium 138 136 - 145 mmol/L    Potassium 3.9 3.5 - 5.1 mmol/L    Chloride 97 95 - 110 mmol/L    CO2 28 23 - 29 mmol/L    Glucose 88 70 - 110 mg/dL    BUN 56 (H) 8 - 23 mg/dL    Creatinine 4.6 (H) 0.5 - 1.4 mg/dL    Calcium 7.5 (L) 8.7 - 10.5 mg/dL    Total Protein 4.6 (L) 6.0 - 8.4 g/dL    Albumin 2.4 (L) 3.5 - 5.2 g/dL    Total Bilirubin 0.6 0.1 - 1.0  mg/dL    Alkaline Phosphatase 60 55 - 135 U/L    AST 16 10 - 40 U/L    ALT 19 10 - 44 U/L    Anion Gap 13 8 - 16 mmol/L    eGFR 9.4 (A) >60 mL/min/1.73 m^2   CBC auto differential    Collection Time: 11/13/22  2:33 AM   Result Value Ref Range    WBC 6.76 3.90 - 12.70 K/uL    RBC 2.57 (L) 4.00 - 5.40 M/uL    Hemoglobin 7.5 (L) 12.0 - 16.0 g/dL    Hematocrit 23.3 (L) 37.0 - 48.5 %    MCV 91 82 - 98 fL    MCH 29.2 27.0 - 31.0 pg    MCHC 32.2 32.0 - 36.0 g/dL    RDW 16.2 (H) 11.5 - 14.5 %    Platelets 114 (L) 150 - 450 K/uL    MPV 10.9 9.2 - 12.9 fL    Immature Granulocytes 0.3 0.0 - 0.5 %    Gran # (ANC) 5.6 1.8 - 7.7 K/uL    Immature Grans (Abs) 0.02 0.00 - 0.04 K/uL    Lymph # 0.7 (L) 1.0 - 4.8 K/uL    Mono # 0.5 0.3 - 1.0 K/uL    Eos # 0.0 0.0 - 0.5 K/uL    Baso # 0.00 0.00 - 0.20 K/uL    nRBC 0 0 /100 WBC    Gran % 82.2 (H) 38.0 - 73.0 %    Lymph % 10.7 (L) 18.0 - 48.0 %    Mono % 6.7 4.0 - 15.0 %    Eosinophil % 0.1 0.0 - 8.0 %    Basophil % 0.0 0.0 - 1.9 %    Differential Method Automated    Magnesium    Collection Time: 11/13/22  2:33 AM   Result Value Ref Range    Magnesium 1.8 1.6 - 2.6 mg/dL   Phosphorus    Collection Time: 11/13/22  2:33 AM   Result Value Ref Range    Phosphorus 4.9 (H) 2.7 - 4.5 mg/dL   POCT glucose    Collection Time: 11/13/22  5:01 AM   Result Value Ref Range    POCT Glucose 90 70 - 110 mg/dL   POCT glucose    Collection Time: 11/13/22 11:48 AM   Result Value Ref Range    POCT Glucose 122 (H) 70 - 110 mg/dL         Significant Imaging: I have reviewed all pertinent imaging results/findings within the past 24 hours.    Imaging Results               CT Chest With Contrast (Final result)  Result time 10/17/22 14:26:36      Final result by Lacy Camp MD (10/17/22 14:26:36)                   Impression:      Interval progression of bilateral perihilar dense consolidation with peripheral patchy areas of ground-glass opacification nonspecific as to the etiology.  Bilateral pneumonia as  well as inflammatory etiologies considered.  Cardiogenic pulmonary edema considered less likely given the pattern.    Borderline sized mediastinal lymph node appears stable.    No other interval detrimental changes since 10/14/2022.    This report was flagged in Epic as abnormal.      Electronically signed by: Lacy Camp  Date:    10/17/2022  Time:    14:26               Narrative:    EXAMINATION:  CT CHEST WITH CONTRAST    CLINICAL HISTORY:  Aspiration;    TECHNIQUE:  Low dose axial images, sagittal and coronal reformations were obtained from the thoracic inlet to the lung bases following the IV administration of 75 mL of Omnipaque 350.    COMPARISON:  CT chest without contrast 10/14/2022, CTA chest 09/24/2022    FINDINGS:  Base of Neck: No significant abnormality.    Thoracic soft tissues: Unremarkable.    Aorta: Left-sided aortic arch.  No aneurysm and no significant atherosclerosis.    Heart: Normal in size.  There is no convincing pericardial effusion with prior trace effusion no longer apparent.    Pulmonary vasculature: Pulmonary arteries distribute normally with no visible significant central pulmonary thromboemboli.  Pulmonary artery caliber is normal.    Essence/Mediastinum: 9 mm in short axis lymph node in the AP window again noted.  No convincing new adenopathy.  Axillary shotty lymph nodes with fatty essence appear stable.    Airways: Patent.    Lungs/Pleura: Perihilar alveolar opacities with ground-glass component appear more prominent bilaterally as compared to the previous examination 10/14/2022 with dense consolidation centered in the perihilar region.  More patchy ground-glass opacities extend to the periphery.  There is no acute pleural effusion.    Esophagus: Unremarkable.    Upper Abdomen: Multiple low densities are again noted in the liver left lobe too small to characterize but likely cysts largest measuring 8 mm axial image 93 series 2.  There is no new abnormality of the partially imaged  upper abdomen.    Bones: There is no acute fracture or suspicious lytic or sclerotic lesion involving the imaged chest wall osseous structures.  There is spondylosis and kyphosis of the imaged spine.  No subluxation.

## 2022-11-13 NOTE — PT/OT/SLP PROGRESS
"Physical Therapy Treatment    Patient Name:  Kristin Goodman   MRN:  [id number]    Recommendations:     Discharge Recommendations:  nursing facility, skilled   Discharge Equipment Recommendations: bedside commode   Barriers to discharge:  Pt currently requiring increased level of assistance with mobility    Assessment:     Kristin Goodman is a 77 y.o. female admitted with a medical diagnosis of Microscopic polyangiitis.  She presents with the following impairments/functional limitations:  weakness, impaired endurance, impaired self care skills, impaired functional mobility, impaired balance, decreased upper extremity function, decreased lower extremity function, decreased safety awareness Pt found in the recliner chair with daughter and family friend. Pt agreed to therapy. Pt appeared weaker today and required min A with TE to BLE on the last set of exercises. Pt was transferred to bed @ mod A. Pt will continue to benefit from skilled IP rehab to improve mobility and strength.    Rehab Prognosis: Good; patient would benefit from acute skilled PT services to address these deficits and reach maximum level of function.    Recent Surgery: * No surgery found *      Plan:     During this hospitalization, patient to be seen 3 x/week to address the identified rehab impairments via gait training, therapeutic activities, therapeutic exercises, neuromuscular re-education and progress toward the following goals:    Plan of Care Expires:  11/30/22    Subjective     Chief Complaint: "This indigestion is no joke; not doing well today, my stomach hurts; I'm tired I want to go back to bed".  Patient/Family Comments/goals: "She is not feeling well today"  Pain/Comfort:  Pain Rating 1:  (not rated)  Location - Orientation 1: generalized  Location 1: abdomen  Pain Addressed 1: Nurse notified      Objective:     Communicated with nurse prior to session.  Patient found up in chair with telemetry upon PT entry to room.     General Precautions: " Standard, fall   Orthopedic Precautions:N/A   Braces: N/A  Respiratory Status: Room air     Functional Mobility:  Bed Mobility:     Rolling Left:  moderate assistance  Scooting: minimum assistance  Sit to Supine: minimum assistance  Transfers:     Sit to Stand:  moderate assistance with no AD  Chair to Bed: moderate assistance with  no AD  using  Step Transfer  Therapeutic exercise: Hip flexion, knee extension, heel/toe raises in chair x30 reps      AM-PAC 6 CLICK MOBILITY  Turning over in bed (including adjusting bedclothes, sheets and blankets)?: 3  Sitting down on and standing up from a chair with arms (e.g., wheelchair, bedside commode, etc.): 3  Moving from lying on back to sitting on the side of the bed?: 3  Moving to and from a bed to a chair (including a wheelchair)?: 3  Need to walk in hospital room?: 3  Climbing 3-5 steps with a railing?: 2  Basic Mobility Total Score: 17       Treatment & Education:  Pt was educated on the importance of OOB activities and therapeutic exercises to perform in bed.     Patient left HOB elevated with all lines intact, call button in reach, bed alarm on, and MD and daughter present..    GOALS:   Multidisciplinary Problems       Physical Therapy Goals          Problem: Physical Therapy    Goal Priority Disciplines Outcome Goal Variances Interventions   Physical Therapy Goal     PT, PT/OT Ongoing, Progressing     Description: Goals to be met by: 2022     Patient will increase functional independence with mobility by performin. Supine to sit with contact guard assistance  2. Sit to supine with contact guard assistance  3. Sit to stand transfer with minimum assistance MET   3a. STS supvn LRAD  4. Gait  x 40 feet with minimum assistance using LRAD as needed  5. Lower extremity exercise program x10 reps per handout, with independence                        Time Tracking:     PT Received On: 22  PT Start Time: 940     PT Stop Time: 1006  PT Total Time (min):  26 min     Billable Minutes: Therapeutic Activity 14 and Therapeutic Exercise 12    Treatment Type: Treatment  PT/PTA: PTA     PTA Visit Number: 2     11/13/2022

## 2022-11-13 NOTE — PROGRESS NOTES
[facility] ([place] [address])  [facility] Medicine  Progress Note    Patient Name: Kristin Goodman  MRN: [id number]  Patient Class: IP- Inpatient   Admission Date: 10/17/2022  Length of Stay: 27 days  Attending Physician: Danielle Palomino MD  Primary Care Provider: Lroi Hernandez MD        Subjective:     Principal Problem:Microscopic polyangiitis        HPI:  Kristin Goodman is a 77 y.o. female with a past medical history of HTN, hypothyroidism, HFpEF, and osteoarthritis of the arm who has presented to the ED for cough, SOB, and weakness. Daughter is present at the bedside. Patient presented to the ED on 9/24 with hemoptysis, CT and CXR showed high suspicion for multilobar pneumonia. Patient was discharged with a 10-day course of levofloxacin and an albuterol inhaler. Patient followed up with her PCP on 10/4 with slight improvement in symptoms and went back to work. Patient continued to have worsening symptoms and presented to the ED again on 10/14 for evaluation and no interventions were done. Patient endorses fevers over the last few days up to 102.4 F with progressive SOB. She endorses hemoptysis with moderate amount of blood, generalized weakness, productive cough, SOB, and loss of appetite. Denies chest pain, nausea, vomiting, abdominal pain, or urinary changes.    ED: hypertensive up to 219/93 and tachycardic up to 117. Oxygen saturation on 92% on RA, placed on 5L NC with sats >97%. CBC remarkable for leukocytosis of 16.03 and Hb 7.7. K 3.3. Cr 1.6, baseline ~1.0. . Troponin 0.061. COVID and flu negative. EKG NSR. CT chest with contrast pending at time of admission. Given home amlodipine and lisinopril. Given 500mL NS, IV azithromycin, cefepime and vanc.       Overview/Hospital Course:  Ms. Goodman was admitted to Hospital Medicine for management of multifocal pneumonia and acute hypoxemic respiratory failure after presenting to the ED with fevers, cough, hemoptysis, and dyspnea. She required  escalation to the Medical ICU due to abrupt decline in her respiratory status, associated with hemoptysis and requiring NIPPV. CT chest showed bilateral patchy ground glass opacities. Labs additionally were notable for acute renal failure on CKD3. Pulmonology was consulted while under the care of [facility] and felt this picture was consistent with diffuse alveolar hemorrhage.     Her workup revealed a strongly positive MPO Ab consistent with clinical picture of microscopic polyangiitis with pulmonary and renal involvement. She underwent Trialysis catheter placement 10/25/2022 in the Medical ICU. 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     Rheumatology was consulted, extensive workup revealed MITUL + 1:2560 homogenous, +dsDNA, normal complements, PR3 1.2 (slight +),  (+), cANCA + 1:80, pANCA neg, GBMAb neg, trace cryos. Due to concern for MPA, she was started on pulse dose IV methylprednisolone 1000mg for 3 days, and plasmapheresis under the guidance of Transfusion Medicine. She remains on a steroid taper and has completed PLEX. She additionally received rituximab and cyclophosphamide. OI prophylaxis was provided with atovaquone due to a sulfa allergy.     Nephrology was consulted for evaluation of acute renal failure in the setting of MPA. She did require SLED in the ICU for renal clearance and remains on intermittent hemodialysis. She is expected to continue HD once discharged.     Her acute hypoxemic respiratory failure improved and she was weaned off of supplemental oxygen. She stepped down to [facility] 10/28.     She underwent MBBS for evaluation of dysphagia, which showed global delayed initiation of swallow and aspiration with thin liquids. Due to  concern for possible prior stroke, head imaging was completed and negative for acute finding. She is NPO with NG tube. ENT was consulted for evaluation of dysphagia and cervical adenopathy.  Patient did not tolerate laryngoscopy; unable to visualize vocal cords but given her lack of hoarseness, they did not suspect vocal fold paresis/paralysis. MRI recommended by rheum to fully rule out any potential neurological cause of the patient's dysphagia; but demonstrated   remote left thalamic lacunar type infarct and punctate remote left cerebellar infarcts.  She is continuing to work with speech therapy.      She is due rituximab and cyclophosphamide on 11/10.     Her other chronic medical conditions including hypothyroidism, diastolic CHF, and hypertension were managed with her home medications, with dose adjustments as needed.         Interval History:   Patient seen and examined at bedside.    No acute events overnight.  However, notes significant epigastric burning this morning similar to a few days ago.  Denies any nausea, vomiting, abdominal pain.  Notes no left, lateral neck pain or odynophagia today.      Review of Systems   Constitutional:  Positive for fatigue. Negative for chills, diaphoresis and fever.   HENT:  Positive for trouble swallowing. Negative for congestion and sore throat.    Eyes:  Negative for visual disturbance.   Respiratory:  Negative for cough, shortness of breath and wheezing.    Cardiovascular:  Negative for chest pain, palpitations and leg swelling.   Gastrointestinal:  Negative for abdominal pain, nausea and vomiting.   Genitourinary:  Positive for decreased urine volume.   Musculoskeletal:  Negative for arthralgias and myalgias.   Skin:  Negative for rash.   Neurological:  Positive for weakness. Negative for dizziness, light-headedness and headaches.   Psychiatric/Behavioral:  Negative for confusion, decreased concentration and sleep disturbance.      Objective:     Vital Signs (Most  Recent):  Temp: 98.8 °F (37.1 °C) (11/13/22 1148)  Pulse: 76 (11/13/22 1148)  Resp: 17 (11/13/22 1148)  BP: 130/60 (11/13/22 1148)  SpO2: 95 % (11/13/22 1148)   Vital Signs (24h Range):  Temp:  [97.7 °F (36.5 °C)-99.1 °F (37.3 °C)] 98.8 °F (37.1 °C)  Pulse:  [65-82] 76  Resp:  [13-21] 17  SpO2:  [94 %-99 %] 95 %  BP: (110-146)/(54-68) 130/60     Weight: 63 kg (138 lb 14.2 oz)  Body mass index is 26.24 kg/m².    Intake/Output Summary (Last 24 hours) at 11/13/2022 1316  Last data filed at 11/12/2022 2000  Gross per 24 hour   Intake 400 ml   Output --   Net 400 ml        Physical Exam  Vitals and nursing note reviewed.   Constitutional:       General: She is awake.      Appearance: She is well-developed. She is ill-appearing. She is not toxic-appearing or diaphoretic.   HENT:      Head: Normocephalic and atraumatic.      Right Ear: External ear normal.      Left Ear: External ear normal.      Nose:      Comments: + NGT     Mouth/Throat:      Mouth: Mucous membranes are dry.      Comments: Thrush improved  Eyes:      General: Lids are normal. No scleral icterus.        Right eye: No discharge.         Left eye: No discharge.   Cardiovascular:      Rate and Rhythm: Normal rate and regular rhythm.   Pulmonary:      Effort: Pulmonary effort is normal.      Breath sounds: Normal breath sounds. No wheezing or rhonchi.   Abdominal:      General: Bowel sounds are normal.      Palpations: Abdomen is soft.      Tenderness: There is no abdominal tenderness.   Musculoskeletal:         General: No deformity.      Cervical back: Normal range of motion and neck supple. No rigidity or tenderness.      Right lower leg: No edema.      Left lower leg: No edema.   Skin:     General: Skin is warm and dry.   Neurological:      General: No focal deficit present.      Mental Status: She is alert and oriented to person, place, and time. Mental status is at baseline.   Psychiatric:         Attention and Perception: Attention normal.          Behavior: Behavior normal.       Significant Labs: All pertinent labs within the past 24 hours have been reviewed.    Recent Results (from the past 24 hour(s))   POCT glucose    Collection Time: 11/12/22  6:23 PM   Result Value Ref Range    POCT Glucose 175 (H) 70 - 110 mg/dL   POCT glucose    Collection Time: 11/13/22 12:35 AM   Result Value Ref Range    POCT Glucose 97 70 - 110 mg/dL   Comprehensive metabolic panel    Collection Time: 11/13/22  2:33 AM   Result Value Ref Range    Sodium 138 136 - 145 mmol/L    Potassium 3.9 3.5 - 5.1 mmol/L    Chloride 97 95 - 110 mmol/L    CO2 28 23 - 29 mmol/L    Glucose 88 70 - 110 mg/dL    BUN 56 (H) 8 - 23 mg/dL    Creatinine 4.6 (H) 0.5 - 1.4 mg/dL    Calcium 7.5 (L) 8.7 - 10.5 mg/dL    Total Protein 4.6 (L) 6.0 - 8.4 g/dL    Albumin 2.4 (L) 3.5 - 5.2 g/dL    Total Bilirubin 0.6 0.1 - 1.0 mg/dL    Alkaline Phosphatase 60 55 - 135 U/L    AST 16 10 - 40 U/L    ALT 19 10 - 44 U/L    Anion Gap 13 8 - 16 mmol/L    eGFR 9.4 (A) >60 mL/min/1.73 m^2   CBC auto differential    Collection Time: 11/13/22  2:33 AM   Result Value Ref Range    WBC 6.76 3.90 - 12.70 K/uL    RBC 2.57 (L) 4.00 - 5.40 M/uL    Hemoglobin 7.5 (L) 12.0 - 16.0 g/dL    Hematocrit 23.3 (L) 37.0 - 48.5 %    MCV 91 82 - 98 fL    MCH 29.2 27.0 - 31.0 pg    MCHC 32.2 32.0 - 36.0 g/dL    RDW 16.2 (H) 11.5 - 14.5 %    Platelets 114 (L) 150 - 450 K/uL    MPV 10.9 9.2 - 12.9 fL    Immature Granulocytes 0.3 0.0 - 0.5 %    Gran # (ANC) 5.6 1.8 - 7.7 K/uL    Immature Grans (Abs) 0.02 0.00 - 0.04 K/uL    Lymph # 0.7 (L) 1.0 - 4.8 K/uL    Mono # 0.5 0.3 - 1.0 K/uL    Eos # 0.0 0.0 - 0.5 K/uL    Baso # 0.00 0.00 - 0.20 K/uL    nRBC 0 0 /100 WBC    Gran % 82.2 (H) 38.0 - 73.0 %    Lymph % 10.7 (L) 18.0 - 48.0 %    Mono % 6.7 4.0 - 15.0 %    Eosinophil % 0.1 0.0 - 8.0 %    Basophil % 0.0 0.0 - 1.9 %    Differential Method Automated    Magnesium    Collection Time: 11/13/22  2:33 AM   Result Value Ref Range    Magnesium 1.8 1.6 -  2.6 mg/dL   Phosphorus    Collection Time: 11/13/22  2:33 AM   Result Value Ref Range    Phosphorus 4.9 (H) 2.7 - 4.5 mg/dL   POCT glucose    Collection Time: 11/13/22  5:01 AM   Result Value Ref Range    POCT Glucose 90 70 - 110 mg/dL   POCT glucose    Collection Time: 11/13/22 11:48 AM   Result Value Ref Range    POCT Glucose 122 (H) 70 - 110 mg/dL         Significant Imaging: I have reviewed all pertinent imaging results/findings within the past 24 hours.    Imaging Results               CT Chest With Contrast (Final result)  Result time 10/17/22 14:26:36      Final result by Lacy Camp MD (10/17/22 14:26:36)                   Impression:      Interval progression of bilateral perihilar dense consolidation with peripheral patchy areas of ground-glass opacification nonspecific as to the etiology.  Bilateral pneumonia as well as inflammatory etiologies considered.  Cardiogenic pulmonary edema considered less likely given the pattern.    Borderline sized mediastinal lymph node appears stable.    No other interval detrimental changes since 10/14/2022.    This report was flagged in Epic as abnormal.      Electronically signed by: Lacy Camp  Date:    10/17/2022  Time:    14:26               Narrative:    EXAMINATION:  CT CHEST WITH CONTRAST    CLINICAL HISTORY:  Aspiration;    TECHNIQUE:  Low dose axial images, sagittal and coronal reformations were obtained from the thoracic inlet to the lung bases following the IV administration of 75 mL of Omnipaque 350.    COMPARISON:  CT chest without contrast 10/14/2022, CTA chest 09/24/2022    FINDINGS:  Base of Neck: No significant abnormality.    Thoracic soft tissues: Unremarkable.    Aorta: Left-sided aortic arch.  No aneurysm and no significant atherosclerosis.    Heart: Normal in size.  There is no convincing pericardial effusion with prior trace effusion no longer apparent.    Pulmonary vasculature: Pulmonary arteries distribute normally with no visible  significant central pulmonary thromboemboli.  Pulmonary artery caliber is normal.    Essence/Mediastinum: 9 mm in short axis lymph node in the AP window again noted.  No convincing new adenopathy.  Axillary shotty lymph nodes with fatty essence appear stable.    Airways: Patent.    Lungs/Pleura: Perihilar alveolar opacities with ground-glass component appear more prominent bilaterally as compared to the previous examination 10/14/2022 with dense consolidation centered in the perihilar region.  More patchy ground-glass opacities extend to the periphery.  There is no acute pleural effusion.    Esophagus: Unremarkable.    Upper Abdomen: Multiple low densities are again noted in the liver left lobe too small to characterize but likely cysts largest measuring 8 mm axial image 93 series 2.  There is no new abnormality of the partially imaged upper abdomen.    Bones: There is no acute fracture or suspicious lytic or sclerotic lesion involving the imaged chest wall osseous structures.  There is spondylosis and kyphosis of the imaged spine.  No subluxation.                                          Assessment/Plan:      * Microscopic polyangiitis  As of 10/26/22 strongly positive MPO Ab (in contrast to borderline positive PR3), consistent with clinical picture of microscopic polyangiitis with pulmonary, and renal involvement after presenting with acute hypoxemic respiratory failure, hemoptysis, and acute renal failure.  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   3)  CHANGES SUGGESTIVE OF FOCAL ACUTE  PYELONEPHRITIS.   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 Now following PEXIVAS steroid taper. Currently on IV Solumedrol 20 mg daily.   - plan for 20mg IV daily on 11/6 for 14 days (last dose of 20mg equivalent is 11/20/2022).   - apheresis: underwent PLEX 10/26, 10/27, 10/30, 11/1, 11/2, 11/3  - rituximab every 7 days for 4 doses: Dose #1: 10/27, #2 11/3, #3 11/10; Next Rituximab 375 mg/m^2 due Nov 17th   - cyclophosphamide every 14 days for 2 doses: 10/27, 11/10  - Opportunistic Infection ppx: atovaquone 1500mg PO daily  - GI bleed prophylaxis: pantoprazole 40mg daily     Gastric reflux  S/p GI cocktail  PPI BID  Elevated HOB; feeds changed to bolus and tolerating well  Symptoms initially improved however reoccurrence on 11/13 without significant dietary changes; GI planning for EGD 11/14      Gastrointestinal hemorrhage with melena  Starting having hemoptysis and melena with associated acute blood loss anemia earlier in hospital stay. Patient with known internal hemorrhoids, mild sigmoid diverticulosis, history of gastritis and + H pylori (2012 EGD).   - GI consulted 10/19, unable to perform EGD due to instability and respiratory failure at the time    Plan  - patient unable to take PO PPI due to NGT removal on 11/5, transition to IV PPI 40mg pantoprazole daily, return to per NG tube once replaced  -PPI transitioned to BID as concern for GERD  - Monitor CBC closely for signs of recurrent bleed          Dysphagia  SLP following  MBSS showing global weakness in swallowing as well as aspiration with thin liquids.   ENT  consulted but patient did not tolerate laryngoscopy     Plan   - Discussed case with Rheumatology team, they are concerned that this dysphagia may have been from prior stroke, requesting imaging for evaluation-  CT demonstrated no acute findings or causes for dysphagia NOR did MRI   - removed NGT and place dobhoff which is more comfortable and makes swallowing easier compared to NGT.    - continue working with speech therapy   -exam concerning for thrush; nystatin swish and swallow initiated; GI consulted as concern that oropharyngeal candidiasis may be contributing to dysphagia  -Per GI, Lower suspicion for esophageal candidiasis without odynophagia however can If white plaques have been seen on oropharynx, ok to start fluconazole empirically for possible esophageal candidiasis  -tentatively plan for EGD on 11/12 if persistent dysphagia    Moderate malnutrition  Nutrition consulted. Most recent weight and BMI monitored-          Malnutrition (Moderate to Severe)  Weight Loss (Malnutrition): 7.5% in 3 months              Measurements:  Wt Readings from Last 1 Encounters:   11/09/22 63 kg (138 lb 14.2 oz)   Body mass index is 26.24 kg/m².    Recommendations: Recommendation/Intervention: 1. As tolerated, increase TF rate (of Novasource) to 35 mL/hr  - 2. RD to monitor & follow-up.  Goals: Meet % EEN, EPN by RD f/u date    Patient has been screened and assessed by RD. RD will follow patient.      Acute renal failure with tubular necrosis  Due to microscopic polyangiitis. Remains on hemodialysis intermittently.   - Baseline creatinine 1.0-1.2.   - New onset proteinuria/hematuria.   - Renal biopsy completed and   - Trialysis in place for intermittent Hemodialysis    Plan  - Nephrology consulted, appreciate management  - management of MPA as noted separately  - Renal function panel daily  - renally dose all medications  - intermittent Hemodialysis as indicated, per Nephrology     Acute hypoxemic respiratory  failure  Multifocal pneumonia  Hemoptysis  Dyspnea  Diffuse Alveolar Hemorrahge  In the setting of a new diagnosis of microscopic polyangiitis, presented with fevers, dyspnea,.  - Chest imaging was consistent with diffuse alveolar hemorrhage.  CT chest wc 10/17 with bl perihilar dense consolidations w/ peripheral patcy areas of GGO, BL PNA, less c/f cardiogenic pulmonary edema.  - Patient upgraded to Medical ICU 10/23/22 with abrupt respiratory decline requiring NIPPV, improved with high dose IV steroids, plasmapheresis, emergent hemodialysis.   - Unable to perform bronchoscopy in the Medical ICU due to tenuous respiratory status  - As of 11/6, hypoxemia has significantly improved    Plan:  - weaned to room air  - continue management of underlying triggers with steroid and immunosuppressants  - incentive spirometry and respiratory hygiene    Lymphadenopathy of head and neck  - Has bilateral neck gland swelling with tenderness,consulted ENT  - No surgical intervention indicated   - Adenopathy likely reactive in nature   - Recommend further workup if it does not resolve within next 2 weeks     Acute blood loss anemia  In the setting of GI bleed with melena  - Evaluated by GI, see GI bleed documentation  - Last transfusion 10/28, Hgb slowly trending down since then  - Hgb 6.9 on 11/5      Plan  - Monitor CBC Daily   - Treat with pRBC transfusion PRN Hgb <7  - qualifies for transfusion on 11/5 with Hgb 6.9, 1u pRBC ordered  -stable       Chronic diastolic heart failure  Pulmonary Hypertension due to left heart disease  TTE (10/2022) EF 65%, G1DD  Home meds: Lisinopril, Toprol     No signs or symptoms to suggest acute exacerbation    Plan  - Active volume control with intermittent Hemodialysis per Nephrology   - Daily weights (standing if tolerated)  - Strict I/Os  - Fluid restriction 1.5 day  - Cardiac diet w/ 2 g Na restriction      Primary hypertension  BP Readings from Last 1 Encounters:   11/13/22 130/60     -  Patient is unable to tolerate PO mediations, all meds to be administered via NG tube  -Will continue to monitor blood pressure trend closely and adjust antihypertensive regimen as clinically indicated and tolerated.    amLODIPine tablet 10 mg, 10 mg, Per NG tube, Daily    carvediloL tablet 12.5 mg, 12.5 mg, Per NG tube, BID    hydrALAZINE tablet 25 mg, 25 mg, Per NG tube, Q8H    lisinopriL tablet 40 mg, 40 mg, Per NG tube, Daily      Hypothyroid  - Continue synthroid 25 mcg  - TSH 0.585 10/27/22      VTE Risk Mitigation (From admission, onward)         Ordered     heparin, porcine (PF) 100 unit/mL injection flush 500 Units  As needed (PRN)         11/10/22 1430     heparin (porcine) injection 1,000 Units  As needed (PRN)         11/09/22 1601     heparin (porcine) injection 1,000 Units  As needed (PRN)         11/08/22 0854     Place sequential compression device  Until discontinued         10/25/22 1731     IP VTE HIGH RISK PATIENT  Once         10/17/22 1354     Reason for No Pharmacological VTE Prophylaxis  Once        Question:  Reasons:  Answer:  Risk of Bleeding    10/17/22 1354                Discharge Planning   ANTHONY: 11/16/2022     Code Status: Full Code   Is the patient medically ready for discharge?: No    Reason for patient still in hospital (select all that apply): Patient trending condition, Treatment, Imaging, Consult recommendations and Pending disposition  Discharge Plan A: Home with family                  Danielle Palomino MD  Department of Hospital Medicine   [facility] - Intensive Care ([place]-14)

## 2022-11-13 NOTE — ASSESSMENT & PLAN NOTE
S/p GI cocktail  PPI BID  Elevated HOB; feeds changed to bolus and tolerating well  Symptoms initially improved however reoccurrence on 11/13 without significant dietary changes; GI planning for EGD 11/14

## 2022-11-13 NOTE — ASSESSMENT & PLAN NOTE
BP Readings from Last 1 Encounters:   11/13/22 130/60     - Patient is unable to tolerate PO mediations, all meds to be administered via NG tube  -Will continue to monitor blood pressure trend closely and adjust antihypertensive regimen as clinically indicated and tolerated.    amLODIPine tablet 10 mg, 10 mg, Per NG tube, Daily    carvediloL tablet 12.5 mg, 12.5 mg, Per NG tube, BID    hydrALAZINE tablet 25 mg, 25 mg, Per NG tube, Q8H    lisinopriL tablet 40 mg, 40 mg, Per NG tube, Daily

## 2022-11-14 ENCOUNTER — ANESTHESIA EVENT (OUTPATIENT)
Dept: ENDOSCOPY | Facility: HOSPITAL | Age: 77
DRG: 871 | End: 2022-11-14
Payer: MEDICARE

## 2022-11-14 ENCOUNTER — ANESTHESIA (OUTPATIENT)
Dept: ENDOSCOPY | Facility: HOSPITAL | Age: 77
DRG: 871 | End: 2022-11-14
Payer: MEDICARE

## 2022-11-14 LAB
ANION GAP SERPL CALC-SCNC: 15 MMOL/L (ref 8–16)
BUN SERPL-MCNC: 69 MG/DL (ref 8–23)
CALCIUM SERPL-MCNC: 7.8 MG/DL (ref 8.7–10.5)
CHLORIDE SERPL-SCNC: 95 MMOL/L (ref 95–110)
CO2 SERPL-SCNC: 24 MMOL/L (ref 23–29)
CREAT SERPL-MCNC: 5.9 MG/DL (ref 0.5–1.4)
EST. GFR  (NO RACE VARIABLE): 7 ML/MIN/1.73 M^2
GLUCOSE SERPL-MCNC: 81 MG/DL (ref 70–110)
MAGNESIUM SERPL-MCNC: 1.9 MG/DL (ref 1.6–2.6)
PHOSPHATE SERPL-MCNC: 5.8 MG/DL (ref 2.7–4.5)
POCT GLUCOSE: 129 MG/DL (ref 70–110)
POCT GLUCOSE: 157 MG/DL (ref 70–110)
POCT GLUCOSE: 90 MG/DL (ref 70–110)
POCT GLUCOSE: 99 MG/DL (ref 70–110)
POTASSIUM SERPL-SCNC: 4.6 MMOL/L (ref 3.5–5.1)
SODIUM SERPL-SCNC: 134 MMOL/L (ref 136–145)

## 2022-11-14 PROCEDURE — 63600175 PHARM REV CODE 636 W HCPCS: Performed by: NURSE ANESTHETIST, CERTIFIED REGISTERED

## 2022-11-14 PROCEDURE — 25000003 PHARM REV CODE 250: Performed by: STUDENT IN AN ORGANIZED HEALTH CARE EDUCATION/TRAINING PROGRAM

## 2022-11-14 PROCEDURE — 99900035 HC TECH TIME PER 15 MIN (STAT)

## 2022-11-14 PROCEDURE — 63600175 PHARM REV CODE 636 W HCPCS: Performed by: STUDENT IN AN ORGANIZED HEALTH CARE EDUCATION/TRAINING PROGRAM

## 2022-11-14 PROCEDURE — 63600175 PHARM REV CODE 636 W HCPCS: Performed by: INTERNAL MEDICINE

## 2022-11-14 PROCEDURE — 99232 PR SUBSEQUENT HOSPITAL CARE,LEVL II: ICD-10-PCS | Mod: ,,, | Performed by: STUDENT IN AN ORGANIZED HEALTH CARE EDUCATION/TRAINING PROGRAM

## 2022-11-14 PROCEDURE — 37000009 HC ANESTHESIA EA ADD 15 MINS: Performed by: INTERNAL MEDICINE

## 2022-11-14 PROCEDURE — 83735 ASSAY OF MAGNESIUM: CPT

## 2022-11-14 PROCEDURE — D9220A PRA ANESTHESIA: Mod: CRNA,,, | Performed by: NURSE ANESTHETIST, CERTIFIED REGISTERED

## 2022-11-14 PROCEDURE — 30200315 PPD INTRADERMAL TEST REV CODE 302: Performed by: STUDENT IN AN ORGANIZED HEALTH CARE EDUCATION/TRAINING PROGRAM

## 2022-11-14 PROCEDURE — D9220A PRA ANESTHESIA: ICD-10-PCS | Mod: CRNA,,, | Performed by: NURSE ANESTHETIST, CERTIFIED REGISTERED

## 2022-11-14 PROCEDURE — 84100 ASSAY OF PHOSPHORUS: CPT

## 2022-11-14 PROCEDURE — 25000003 PHARM REV CODE 250: Performed by: NURSE ANESTHETIST, CERTIFIED REGISTERED

## 2022-11-14 PROCEDURE — 25000003 PHARM REV CODE 250: Performed by: HOSPITALIST

## 2022-11-14 PROCEDURE — 86580 TB INTRADERMAL TEST: CPT | Performed by: STUDENT IN AN ORGANIZED HEALTH CARE EDUCATION/TRAINING PROGRAM

## 2022-11-14 PROCEDURE — D9220A PRA ANESTHESIA: ICD-10-PCS | Mod: ANES,,, | Performed by: ANESTHESIOLOGY

## 2022-11-14 PROCEDURE — 43235 EGD DIAGNOSTIC BRUSH WASH: CPT | Performed by: INTERNAL MEDICINE

## 2022-11-14 PROCEDURE — 43235 PR EGD, FLEX, DIAGNOSTIC: ICD-10-PCS | Mod: GC,,, | Performed by: INTERNAL MEDICINE

## 2022-11-14 PROCEDURE — 37000008 HC ANESTHESIA 1ST 15 MINUTES: Performed by: INTERNAL MEDICINE

## 2022-11-14 PROCEDURE — 99232 SBSQ HOSP IP/OBS MODERATE 35: CPT | Mod: ,,, | Performed by: STUDENT IN AN ORGANIZED HEALTH CARE EDUCATION/TRAINING PROGRAM

## 2022-11-14 PROCEDURE — D9220A PRA ANESTHESIA: Mod: ANES,,, | Performed by: ANESTHESIOLOGY

## 2022-11-14 PROCEDURE — 94761 N-INVAS EAR/PLS OXIMETRY MLT: CPT

## 2022-11-14 PROCEDURE — 20600001 HC STEP DOWN PRIVATE ROOM

## 2022-11-14 PROCEDURE — 43235 EGD DIAGNOSTIC BRUSH WASH: CPT | Mod: GC,,, | Performed by: INTERNAL MEDICINE

## 2022-11-14 PROCEDURE — 97530 THERAPEUTIC ACTIVITIES: CPT | Mod: CO

## 2022-11-14 PROCEDURE — C9113 INJ PANTOPRAZOLE SODIUM, VIA: HCPCS | Performed by: STUDENT IN AN ORGANIZED HEALTH CARE EDUCATION/TRAINING PROGRAM

## 2022-11-14 PROCEDURE — 80048 BASIC METABOLIC PNL TOTAL CA: CPT | Performed by: STUDENT IN AN ORGANIZED HEALTH CARE EDUCATION/TRAINING PROGRAM

## 2022-11-14 PROCEDURE — 25000003 PHARM REV CODE 250

## 2022-11-14 PROCEDURE — 27000221 HC OXYGEN, UP TO 24 HOURS

## 2022-11-14 RX ORDER — PROPOFOL 10 MG/ML
VIAL (ML) INTRAVENOUS
Status: DISCONTINUED | OUTPATIENT
Start: 2022-11-14 | End: 2022-11-14

## 2022-11-14 RX ORDER — ETOMIDATE 2 MG/ML
INJECTION INTRAVENOUS
Status: DISCONTINUED | OUTPATIENT
Start: 2022-11-14 | End: 2022-11-14

## 2022-11-14 RX ORDER — KETAMINE HCL IN 0.9 % NACL 50 MG/5 ML
SYRINGE (ML) INTRAVENOUS
Status: DISCONTINUED | OUTPATIENT
Start: 2022-11-14 | End: 2022-11-14

## 2022-11-14 RX ORDER — PHENYLEPHRINE HYDROCHLORIDE 10 MG/ML
INJECTION INTRAVENOUS
Status: DISCONTINUED | OUTPATIENT
Start: 2022-11-14 | End: 2022-11-14

## 2022-11-14 RX ORDER — VASOPRESSIN 20 [USP'U]/ML
INJECTION, SOLUTION INTRAMUSCULAR; SUBCUTANEOUS
Status: DISCONTINUED | OUTPATIENT
Start: 2022-11-14 | End: 2022-11-14

## 2022-11-14 RX ORDER — LIDOCAINE HYDROCHLORIDE 20 MG/ML
INJECTION INTRAVENOUS
Status: DISCONTINUED | OUTPATIENT
Start: 2022-11-14 | End: 2022-11-14

## 2022-11-14 RX ORDER — ONDANSETRON 2 MG/ML
INJECTION INTRAMUSCULAR; INTRAVENOUS
Status: DISCONTINUED | OUTPATIENT
Start: 2022-11-14 | End: 2022-11-14

## 2022-11-14 RX ORDER — HYDROMORPHONE HYDROCHLORIDE 1 MG/ML
0.2 INJECTION, SOLUTION INTRAMUSCULAR; INTRAVENOUS; SUBCUTANEOUS EVERY 5 MIN PRN
Status: DISCONTINUED | OUTPATIENT
Start: 2022-11-14 | End: 2022-11-14 | Stop reason: HOSPADM

## 2022-11-14 RX ORDER — PROPOFOL 10 MG/ML
VIAL (ML) INTRAVENOUS CONTINUOUS PRN
Status: DISCONTINUED | OUTPATIENT
Start: 2022-11-14 | End: 2022-11-14

## 2022-11-14 RX ADMIN — LEVOTHYROXINE SODIUM 25 MCG: 25 TABLET ORAL at 05:11

## 2022-11-14 RX ADMIN — PANTOPRAZOLE SODIUM 40 MG: 40 INJECTION, POWDER, FOR SOLUTION INTRAVENOUS at 09:11

## 2022-11-14 RX ADMIN — GLYCOPYRROLATE 0.1 MG: 0.2 INJECTION, SOLUTION INTRAMUSCULAR; INTRAVITREAL at 10:11

## 2022-11-14 RX ADMIN — VASOPRESSIN 1 UNITS: 20 INJECTION INTRAVENOUS at 10:11

## 2022-11-14 RX ADMIN — TUBERCULIN PURIFIED PROTEIN DERIVATIVE 5 UNITS: 5 INJECTION, SOLUTION INTRADERMAL at 05:11

## 2022-11-14 RX ADMIN — PHENYLEPHRINE HYDROCHLORIDE 100 MCG: 10 INJECTION INTRAVENOUS at 10:11

## 2022-11-14 RX ADMIN — NYSTATIN 500000 UNITS: 500000 SUSPENSION ORAL at 09:11

## 2022-11-14 RX ADMIN — ETOMIDATE 2 MG: 2 INJECTION INTRAVENOUS at 10:11

## 2022-11-14 RX ADMIN — HYDRALAZINE HYDROCHLORIDE 100 MG: 50 TABLET ORAL at 05:11

## 2022-11-14 RX ADMIN — PANTOPRAZOLE SODIUM 40 MG: 40 INJECTION, POWDER, FOR SOLUTION INTRAVENOUS at 08:11

## 2022-11-14 RX ADMIN — AMLODIPINE BESYLATE 10 MG: 10 TABLET ORAL at 08:11

## 2022-11-14 RX ADMIN — ATOVAQUONE 1500 MG: 750 SUSPENSION ORAL at 08:11

## 2022-11-14 RX ADMIN — NYSTATIN 500000 UNITS: 500000 SUSPENSION ORAL at 08:11

## 2022-11-14 RX ADMIN — ETOMIDATE 6 MG: 2 INJECTION INTRAVENOUS at 10:11

## 2022-11-14 RX ADMIN — CARVEDILOL 25 MG: 25 TABLET, FILM COATED ORAL at 08:11

## 2022-11-14 RX ADMIN — QUETIAPINE FUMARATE 25 MG: 25 TABLET ORAL at 09:11

## 2022-11-14 RX ADMIN — ONDANSETRON 4 MG: 2 INJECTION INTRAMUSCULAR; INTRAVENOUS at 10:11

## 2022-11-14 RX ADMIN — METHYLPREDNISOLONE SODIUM SUCCINATE 20 MG: 40 INJECTION, POWDER, FOR SOLUTION INTRAMUSCULAR; INTRAVENOUS at 08:11

## 2022-11-14 RX ADMIN — Medication 20 MG: at 10:11

## 2022-11-14 RX ADMIN — LIDOCAINE HYDROCHLORIDE 100 MG: 20 INJECTION INTRAVENOUS at 10:11

## 2022-11-14 RX ADMIN — PHENYLEPHRINE HYDROCHLORIDE 100 MCG: 10 INJECTION INTRAVENOUS at 09:11

## 2022-11-14 RX ADMIN — LISINOPRIL 40 MG: 20 TABLET ORAL at 08:11

## 2022-11-14 RX ADMIN — PROPOFOL 50 MG: 10 INJECTION, EMULSION INTRAVENOUS at 10:11

## 2022-11-14 RX ADMIN — NYSTATIN 500000 UNITS: 500000 SUSPENSION ORAL at 05:11

## 2022-11-14 RX ADMIN — HYDRALAZINE HYDROCHLORIDE 100 MG: 50 TABLET ORAL at 09:11

## 2022-11-14 RX ADMIN — CARVEDILOL 25 MG: 25 TABLET, FILM COATED ORAL at 09:11

## 2022-11-14 RX ADMIN — HYDRALAZINE HYDROCHLORIDE 100 MG: 50 TABLET ORAL at 02:11

## 2022-11-14 RX ADMIN — SODIUM CHLORIDE: 0.9 INJECTION, SOLUTION INTRAVENOUS at 09:11

## 2022-11-14 RX ADMIN — Medication 145 MCG/KG/MIN: at 10:11

## 2022-11-14 NOTE — PLAN OF CARE
Plan of care reviewed with pt. Pt aaox3, disorientated to time. Ng tube in place to right nare. Bolus tube feeds not given due to indigestion. Gi cocktail given. Water flushes given.  Pt will be NPO at midnight for EGD in am. VSS. Pt remained free of falls, trauma, and injury. Will continue to monitor.

## 2022-11-14 NOTE — PLAN OF CARE
[facility] ([facility]  Discharge Reassessment          Primary Care Provider: Lori Hernandez MD    Expected Discharge Date: 11/16/2022    Accepted By [facility] for HD.  @0938 Hep B surface antigen uploaded in careport as requested by [facility].    Reassessment (most recent)       Discharge Reassessment - 11/14/22 0938          Discharge Reassessment    Assessment Type Discharge Planning Reassessment     Did the patient's condition or plan change since previous assessment? No     Discharge Plan discussed with: Patient     Communicated ANTHONY with patient/caregiver Yes     Discharge Plan A Skilled Nursing Facility     Discharge Plan B Home Health;Home with family     DME Needed Upon Discharge  other (see comments)   TBD    Discharge Barriers Identified --   TBD    Why the patient remains in the hospital Requires continued medical care        Post-Acute Status    Post-Acute Authorization Dialysis;Placement     Post-Acute Placement Status Pending medical clearance/testing     Diaylsis Status Pending medical clearance/testing     Patient choice form signed by patient/caregiver List from System Post-Acute Care     Discharge Delays None known at this time

## 2022-11-14 NOTE — ASSESSMENT & PLAN NOTE
S/p GI cocktail  PPI BID  Elevated HOB; feeds changed to bolus and tolerating well  Symptoms initially improved however reoccurrence on 11/13 without significant dietary changes; GI planning for EGD 11/14 s/p EGD 11/14; Normal Study

## 2022-11-14 NOTE — TRANSFER OF CARE
"Anesthesia Transfer of Care Note    Patient: Kristin Goodman    Procedure(s) Performed: Procedure(s) (LRB):  EGD (ESOPHAGOGASTRODUODENOSCOPY) (N/A)    Patient location: PACU    Anesthesia Type: general    Transport from OR: Transported from OR on room air with adequate spontaneous ventilation    Post pain: adequate analgesia    Post assessment: no apparent anesthetic complications    Post vital signs: stable    Level of consciousness: awake    Nausea/Vomiting: no nausea/vomiting    Complications: none    Transfer of care protocol was followed      Last vitals:   Visit Vitals  /70   Pulse 77   Temp 36.3 °C (97.3 °F) (Temporal)   Resp 19   Ht 5' 1" (1.549 m)   Wt 57.6 kg (127 lb)   SpO2 97%   Breastfeeding No   BMI 24.00 kg/m²     "

## 2022-11-14 NOTE — INTERVAL H&P NOTE
The patient has been examined and the H&P has been reviewed:    I concur with the findings and no changes have occurred since H&P was written.    Procedure risks, benefits and alternative options discussed and understood by patient/family.      EGD for dysphagia    Active Hospital Problems    Diagnosis  POA    *Microscopic polyangiitis [M31.7]  Yes    Anuria [R34]  Yes    High risk medications (not anticoagulants) long-term use [Z79.899]  Not Applicable    Gastric reflux [K21.9]  Yes    Gastrointestinal hemorrhage with melena [K92.1]  No    Dysphagia [R13.10]  Yes    Moderate malnutrition [E44.0]  Yes    Acute renal failure with tubular necrosis [N17.0]  Yes    Acute hypoxemic respiratory failure [J96.01]  Yes    Lymphadenopathy of head and neck [R59.1]  Yes    Acute blood loss anemia [D62]  Yes    Chronic diastolic heart failure [I50.32]  Yes    Hyperkalemia [E87.5]  Yes    Primary hypertension [I10]  Yes    Hypothyroid [E03.9]  Yes      Resolved Hospital Problems    Diagnosis Date Resolved POA    Hemoptysis [R04.2] 11/05/2022 Yes    Diffuse pulmonary alveolar hemorrhage [R04.89] 11/05/2022 Yes    Hypernatremia [E87.0] 11/05/2022 No    Dysuria [R30.0] 11/05/2022 No    Septic shock [A41.9, R65.21] 11/05/2022 Yes    Cervical adenopathy [R59.0] 11/05/2022 No    Hyponatremia [E87.1] 10/29/2022 Yes    Acute renal failure superimposed on stage 3 chronic kidney disease [N17.9, N18.30] 11/05/2022 Yes    Sepsis due to pneumonia [J18.9, A41.9] 10/27/2022 Yes    Multifocal pneumonia [J18.9] 11/05/2022 Yes    Pulmonary HTN [I27.20] 11/05/2022 Yes    CKD (chronic kidney disease), stage III [N18.30] 11/05/2022 Yes    Elevated CPK [R74.8] 10/27/2022 Yes     Chronic

## 2022-11-14 NOTE — [employer] PATIENT INSTRUCTIONS
Discharge Summary/Instructions after an Endoscopic Procedure  Patient Name: Kristin Goodman  Patient MRN: [id number]  Patient YOB: 1945 Monday, November 14, 2022  Asaf Hahn MD  Dear patient,  As a result of recent federal legislation (The Federal Cures Act), you may   receive lab or pathology results from your procedure in your [facility]   account before your physician is able to contact you. Your physician or   their representative will relay the results to you with their   recommendations at their soonest availability.  Thank you,  RESTRICTIONS:  During your procedure today, you received medications for sedation.  These   medications may affect your judgment, balance and coordination.  Therefore,   for 24 hours, you have the following restrictions:   - DO NOT drive a car, operate machinery, make legal/financial decisions,   sign important papers or drink alcohol.    ACTIVITY:  Today: no heavy lifting, straining or running due to procedural   sedation/anesthesia.  The following day: return to full activity including work.  DIET:  Eat and drink normally unless instructed otherwise.     TREATMENT FOR COMMON SIDE EFFECTS:  - Mild abdominal pain, nausea, belching, bloating or excessive gas:  rest,   eat lightly and use a heating pad.  - Sore Throat: treat with throat lozenges and/or gargle with warm salt   water.  - Because air was used during the procedure, expelling large amounts of air   from your rectum or belching is normal.  - If a bowel prep was taken, you may not have a bowel movement for 1-3 days.    This is normal.  SYMPTOMS TO WATCH FOR AND REPORT TO YOUR PHYSICIAN:  1. Abdominal pain or bloating, other than gas cramps.  2. Chest pain.  3. Back pain.  4. Signs of infection such as: chills or fever occurring within 24 hours   after the procedure.  5. Rectal bleeding, which would show as bright red, maroon, or black stools.   (A tablespoon of blood from the rectum is not serious, especially if    hemorrhoids are present.)  6. Vomiting.  7. Weakness or dizziness.  GO DIRECTLY TO THE NEAREST EMERGENCY ROOM IF YOU HAVE ANY OF THE FOLLOWING:      Difficulty breathing              Chills and/or fever over 101 F   Persistent vomiting and/or vomiting blood   Severe abdominal pain   Severe chest pain   Black, tarry stools   Bleeding- more than one tablespoon   Any other symptom or condition that you feel may need urgent attention  Your doctor recommends these additional instructions:  If any biopsies were taken, your doctors clinic will contact you in 1 to 2   weeks with any results.  - Return patient to [facility] for ongoing care.   - Resume previous diet.   - Continue present medications.   For questions, problems or results please call your physician - Asaf Hahn MD at Work:  [phone].  [facility], EMERGENCY ROOM PHONE NUMBER: [phone]  IF A COMPLICATION OR EMERGENCY SITUATION ARISES AND YOU ARE UNABLE TO REACH   YOUR PHYSICIAN - GO DIRECTLY TO THE EMERGENCY ROOM.  Asaf Hahn MD  11/14/2022 10:43:55 AM  This report has been verified and signed electronically.  Dear patient,  As a result of recent federal legislation (The Federal Cures Act), you may   receive lab or pathology results from your procedure in your [facility]   account before your physician is able to contact you. Your physician or   their representative will relay the results to you with their   recommendations at their soonest availability.  Thank you,  [employer]

## 2022-11-14 NOTE — PROGRESS NOTES
[facility] - Intensive Care ([place] [address])  [facility] Medicine  Progress Note    Patient Name: Kristin Goodman  MRN: [id number]  Patient Class: IP- Inpatient   Admission Date: 10/17/2022  Length of Stay: 28 days  Attending Physician: Danielle Palomino MD  Primary Care Provider: Lori Hernandez MD        Subjective:     Principal Problem:Microscopic polyangiitis        HPI:  Kristin Goodman is a 77 y.o. female with a past medical history of HTN, hypothyroidism, HFpEF, and osteoarthritis of the arm who has presented to the ED for cough, SOB, and weakness. Daughter is present at the bedside. Patient presented to the ED on 9/24 with hemoptysis, CT and CXR showed high suspicion for multilobar pneumonia. Patient was discharged with a 10-day course of levofloxacin and an albuterol inhaler. Patient followed up with her PCP on 10/4 with slight improvement in symptoms and went back to work. Patient continued to have worsening symptoms and presented to the ED again on 10/14 for evaluation and no interventions were done. Patient endorses fevers over the last few days up to 102.4 F with progressive SOB. She endorses hemoptysis with moderate amount of blood, generalized weakness, productive cough, SOB, and loss of appetite. Denies chest pain, nausea, vomiting, abdominal pain, or urinary changes.    ED: hypertensive up to 219/93 and tachycardic up to 117. Oxygen saturation on 92% on RA, placed on 5L NC with sats >97%. CBC remarkable for leukocytosis of 16.03 and Hb 7.7. K 3.3. Cr 1.6, baseline ~1.0. . Troponin 0.061. COVID and flu negative. EKG NSR. CT chest with contrast pending at time of admission. Given home amlodipine and lisinopril. Given 500mL NS, IV azithromycin, cefepime and vanc.       Overview/Hospital Course:  Ms. Goodman was admitted to Hospital Medicine for management of multifocal pneumonia and acute hypoxemic respiratory failure after presenting to the ED with fevers, cough, hemoptysis, and dyspnea. She required  escalation to the Medical ICU due to abrupt decline in her respiratory status, associated with hemoptysis and requiring NIPPV. CT chest showed bilateral patchy ground glass opacities. Labs additionally were notable for acute renal failure on CKD3. Pulmonology was consulted while under the care of [facility] and felt this picture was consistent with diffuse alveolar hemorrhage.     Her workup revealed a strongly positive MPO Ab consistent with clinical picture of microscopic polyangiitis with pulmonary and renal involvement. She underwent Trialysis catheter placement 10/25/2022 in the Medical ICU. 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     Rheumatology was consulted, extensive workup revealed MITUL + 1:2560 homogenous, +dsDNA, normal complements, PR3 1.2 (slight +),  (+), cANCA + 1:80, pANCA neg, GBMAb neg, trace cryos. Due to concern for MPA, she was started on pulse dose IV methylprednisolone 1000mg for 3 days, and plasmapheresis under the guidance of Transfusion Medicine. She remains on a steroid taper and has completed PLEX. She additionally received rituximab and cyclophosphamide. OI prophylaxis was provided with atovaquone due to a sulfa allergy.     Nephrology was consulted for evaluation of acute renal failure in the setting of MPA. She did require SLED in the ICU for renal clearance and remains on intermittent hemodialysis. She is expected to continue HD once discharged.     Her acute hypoxemic respiratory failure improved and she was weaned off of supplemental oxygen. She stepped down to [facility] 10/28.     She underwent MBBS for evaluation of dysphagia, which showed global delayed initiation of swallow and aspiration with thin liquids. Due to  concern for possible prior stroke, head imaging was completed and negative for acute finding. She is NPO with NG tube. ENT was consulted for evaluation of dysphagia and cervical adenopathy.  Patient did not tolerate laryngoscopy; unable to visualize vocal cords but given her lack of hoarseness, they did not suspect vocal fold paresis/paralysis. MRI recommended by rheum to fully rule out any potential neurological cause of the patient's dysphagia; but demonstrated   remote left thalamic lacunar type infarct and punctate remote left cerebellar infarcts.  She is continuing to work with speech therapy.  EGD completed 11/14 without abnormalities.  Per GI, Suspect some aspect of esophageal dysmotility in setting of critical illness. No further testing at this time.    She is due rituximab and cyclophosphamide on 11/10.     Her other chronic medical conditions including hypothyroidism, diastolic CHF, and hypertension were managed with her home medications, with dose adjustments as needed.         Interval History:   Patient seen and examined at bedside post EGD.    No acute events overnight.  Denies epigastric burning, nausea, vomiting, abdominal pain.  Notes no left, lateral neck pain or odynophagia.      Review of Systems   Constitutional:  Positive for fatigue. Negative for chills, diaphoresis and fever.   HENT:  Positive for trouble swallowing. Negative for congestion and sore throat.    Eyes:  Negative for visual disturbance.   Respiratory:  Negative for cough, shortness of breath and wheezing.    Cardiovascular:  Negative for chest pain, palpitations and leg swelling.   Gastrointestinal:  Negative for abdominal pain, nausea and vomiting.   Genitourinary:  Positive for decreased urine volume.   Musculoskeletal:  Negative for arthralgias and myalgias.   Skin:  Negative for rash.   Neurological:  Positive for weakness. Negative for dizziness, light-headedness and headaches.   Psychiatric/Behavioral:  Negative for  confusion, decreased concentration and sleep disturbance.      Objective:     Vital Signs (Most Recent):  Temp: 97.6 °F (36.4 °C) (11/14/22 1156)  Pulse: 68 (11/14/22 1156)  Resp: 20 (11/14/22 1156)  BP: 122/60 (11/14/22 1156)  SpO2: 97 % (11/14/22 1156)   Vital Signs (24h Range):  Temp:  [97.3 °F (36.3 °C)-98.9 °F (37.2 °C)] 97.6 °F (36.4 °C)  Pulse:  [67-84] 68  Resp:  [14-23] 20  SpO2:  [93 %-100 %] 97 %  BP: ()/(55-64) 122/60     Weight: 57.6 kg (127 lb)  Body mass index is 24 kg/m².    Intake/Output Summary (Last 24 hours) at 11/14/2022 1446  Last data filed at 11/14/2022 1014  Gross per 24 hour   Intake 350 ml   Output --   Net 350 ml        Physical Exam  Vitals and nursing note reviewed.   Constitutional:       General: She is awake.      Appearance: She is well-developed. She is ill-appearing. She is not toxic-appearing or diaphoretic.   HENT:      Head: Normocephalic and atraumatic.      Right Ear: External ear normal.      Left Ear: External ear normal.      Nose:      Comments: + NGT     Mouth/Throat:      Mouth: Mucous membranes are dry.   Eyes:      General: Lids are normal. No scleral icterus.        Right eye: No discharge.         Left eye: No discharge.   Cardiovascular:      Rate and Rhythm: Normal rate and regular rhythm.   Pulmonary:      Effort: Pulmonary effort is normal.      Breath sounds: Normal breath sounds. No wheezing or rhonchi.   Abdominal:      General: Bowel sounds are normal.      Palpations: Abdomen is soft.      Tenderness: There is no abdominal tenderness.   Musculoskeletal:         General: No deformity.      Cervical back: Normal range of motion and neck supple. No rigidity or tenderness.      Right lower leg: No edema.      Left lower leg: No edema.   Skin:     General: Skin is warm and dry.   Neurological:      General: No focal deficit present.      Mental Status: She is alert and oriented to person, place, and time. Mental status is at baseline.   Psychiatric:          Attention and Perception: Attention normal.         Behavior: Behavior normal.       Significant Labs: All pertinent labs within the past 24 hours have been reviewed.    Recent Results (from the past 24 hour(s))   POCT glucose    Collection Time: 11/13/22  5:47 PM   Result Value Ref Range    POCT Glucose 150 (H) 70 - 110 mg/dL   POCT glucose    Collection Time: 11/13/22 11:21 PM   Result Value Ref Range    POCT Glucose 103 70 - 110 mg/dL   POCT glucose    Collection Time: 11/14/22  4:01 AM   Result Value Ref Range    POCT Glucose 99 70 - 110 mg/dL   Magnesium    Collection Time: 11/14/22  4:06 AM   Result Value Ref Range    Magnesium 1.9 1.6 - 2.6 mg/dL   Phosphorus    Collection Time: 11/14/22  4:06 AM   Result Value Ref Range    Phosphorus 5.8 (H) 2.7 - 4.5 mg/dL   Basic metabolic panel    Collection Time: 11/14/22  4:06 AM   Result Value Ref Range    Sodium 134 (L) 136 - 145 mmol/L    Potassium 4.6 3.5 - 5.1 mmol/L    Chloride 95 95 - 110 mmol/L    CO2 24 23 - 29 mmol/L    Glucose 81 70 - 110 mg/dL    BUN 69 (H) 8 - 23 mg/dL    Creatinine 5.9 (H) 0.5 - 1.4 mg/dL    Calcium 7.8 (L) 8.7 - 10.5 mg/dL    Anion Gap 15 8 - 16 mmol/L    eGFR 7.0 (A) >60 mL/min/1.73 m^2   POCT glucose    Collection Time: 11/14/22  7:42 AM   Result Value Ref Range    POCT Glucose 90 70 - 110 mg/dL   POCT glucose    Collection Time: 11/14/22 12:16 PM   Result Value Ref Range    POCT Glucose 129 (H) 70 - 110 mg/dL         Significant Imaging: I have reviewed all pertinent imaging results/findings within the past 24 hours.    Imaging Results               CT Chest With Contrast (Final result)  Result time 10/17/22 14:26:36      Final result by Lacy Camp MD (10/17/22 14:26:36)                   Impression:      Interval progression of bilateral perihilar dense consolidation with peripheral patchy areas of ground-glass opacification nonspecific as to the etiology.  Bilateral pneumonia as well as inflammatory etiologies considered.   Cardiogenic pulmonary edema considered less likely given the pattern.    Borderline sized mediastinal lymph node appears stable.    No other interval detrimental changes since 10/14/2022.    This report was flagged in Epic as abnormal.      Electronically signed by: Lacy Cruztri  Date:    10/17/2022  Time:    14:26               Narrative:    EXAMINATION:  CT CHEST WITH CONTRAST    CLINICAL HISTORY:  Aspiration;    TECHNIQUE:  Low dose axial images, sagittal and coronal reformations were obtained from the thoracic inlet to the lung bases following the IV administration of 75 mL of Omnipaque 350.    COMPARISON:  CT chest without contrast 10/14/2022, CTA chest 09/24/2022    FINDINGS:  Base of Neck: No significant abnormality.    Thoracic soft tissues: Unremarkable.    Aorta: Left-sided aortic arch.  No aneurysm and no significant atherosclerosis.    Heart: Normal in size.  There is no convincing pericardial effusion with prior trace effusion no longer apparent.    Pulmonary vasculature: Pulmonary arteries distribute normally with no visible significant central pulmonary thromboemboli.  Pulmonary artery caliber is normal.    Essence/Mediastinum: 9 mm in short axis lymph node in the AP window again noted.  No convincing new adenopathy.  Axillary shotty lymph nodes with fatty essence appear stable.    Airways: Patent.    Lungs/Pleura: Perihilar alveolar opacities with ground-glass component appear more prominent bilaterally as compared to the previous examination 10/14/2022 with dense consolidation centered in the perihilar region.  More patchy ground-glass opacities extend to the periphery.  There is no acute pleural effusion.    Esophagus: Unremarkable.    Upper Abdomen: Multiple low densities are again noted in the liver left lobe too small to characterize but likely cysts largest measuring 8 mm axial image 93 series 2.  There is no new abnormality of the partially imaged upper abdomen.    Bones: There is no acute  fracture or suspicious lytic or sclerotic lesion involving the imaged chest wall osseous structures.  There is spondylosis and kyphosis of the imaged spine.  No subluxation.                                          Assessment/Plan:      * Microscopic polyangiitis  As of 10/26/22 strongly positive MPO Ab (in contrast to borderline positive PR3), consistent with clinical picture of microscopic polyangiitis with pulmonary, and renal involvement after presenting with acute hypoxemic respiratory failure, hemoptysis, and acute renal failure.  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   3)  CHANGES SUGGESTIVE OF FOCAL ACUTE PYELONEPHRITIS.   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 Now following PEXIVAS steroid taper. Currently on IV Solumedrol 20 mg daily.   - plan for 20mg IV daily on 11/6 for 14 days (last dose of 20mg equivalent is 11/20/2022).   - apheresis: underwent PLEX 10/26, 10/27, 10/30, 11/1, 11/2, 11/3  - rituximab every 7 days for 4 doses: Dose #1: 10/27, #2 11/3, #3 11/10; Next Rituximab 375 mg/m^2 due Nov 17th   - cyclophosphamide every 14 days for 2 doses: 10/27, 11/10  - Opportunistic  Infection ppx: atovaquone 1500mg PO daily  - GI bleed prophylaxis: pantoprazole 40mg daily     Gastric reflux  S/p GI cocktail  PPI BID  Elevated HOB; feeds changed to bolus and tolerating well  Symptoms initially improved however reoccurrence on 11/13 without significant dietary changes; GI planning for EGD 11/14 s/p EGD 11/14; Normal Study      Gastrointestinal hemorrhage with melena  Starting having hemoptysis and melena with associated acute blood loss anemia earlier in hospital stay. Patient with known internal hemorrhoids, mild sigmoid diverticulosis, history of gastritis and + H pylori (2012 EGD).   - GI consulted 10/19, unable to perform EGD due to instability and respiratory failure at the time    Plan  - patient unable to take PO PPI due to NGT removal on 11/5, transition to IV PPI 40mg pantoprazole daily, return to per NG tube once replaced  - s/p EGD 11/14; Normal Study  - PPI transitioned to BID as concern for GERD  - Monitor CBC closely for signs of recurrent bleed    Dysphagia  SLP following  MBSS showing global weakness in swallowing as well as aspiration with thin liquids.   ENT consulted but patient did not tolerate laryngoscopy     Plan   - Discussed case with Rheumatology team, they are concerned that this dysphagia may have been from prior stroke, requesting imaging for evaluation-  CT demonstrated no acute findings or causes for dysphagia NOR did MRI   - removed NGT and place dobhoff which is more comfortable and makes swallowing easier compared to NGT.    - continue working with speech therapy   -exam concerning for thrush; nystatin swish and swallow initiated; GI consulted as concern that oropharyngeal candidiasis may be contributing to dysphagia  -Per GI, Lower suspicion for esophageal candidiasis without odynophagia however can If white plaques have been seen on oropharynx, ok to start fluconazole empirically for possible esophageal candidiasis  -EGD on 11/14 without abnormality; per GI,  Suspect some aspect of esophageal dysmotility in setting of critical illness. No further testing at this time.    Moderate malnutrition  Nutrition consulted. Most recent weight and BMI monitored-          Malnutrition (Moderate to Severe)  Weight Loss (Malnutrition): 7.5% in 3 months              Measurements:  Wt Readings from Last 1 Encounters:   11/09/22 63 kg (138 lb 14.2 oz)   Body mass index is 26.24 kg/m².    Recommendations: Recommendation/Intervention: 1. As tolerated, increase TF rate (of Novasource) to 35 mL/hr  - 2. RD to monitor & follow-up.  Goals: Meet % EEN, EPN by RD f/u date    Patient has been screened and assessed by RD. RD will follow patient.      Acute renal failure with tubular necrosis  Due to microscopic polyangiitis. Remains on hemodialysis intermittently.   - Baseline creatinine 1.0-1.2.   - New onset proteinuria/hematuria.   - Renal biopsy completed and   - Trialysis in place for intermittent Hemodialysis    Plan  - Nephrology consulted, appreciate management  - management of MPA as noted separately  - Renal function panel daily  - renally dose all medications  - intermittent Hemodialysis as indicated, per Nephrology     Acute hypoxemic respiratory failure  Multifocal pneumonia  Hemoptysis  Dyspnea  Diffuse Alveolar Hemorrahge  In the setting of a new diagnosis of microscopic polyangiitis, presented with fevers, dyspnea,.  - Chest imaging was consistent with diffuse alveolar hemorrhage.  CT chest wc 10/17 with bl perihilar dense consolidations w/ peripheral patcy areas of GGO, BL PNA, less c/f cardiogenic pulmonary edema.  - Patient upgraded to Medical ICU 10/23/22 with abrupt respiratory decline requiring NIPPV, improved with high dose IV steroids, plasmapheresis, emergent hemodialysis.   - Unable to perform bronchoscopy in the Medical ICU due to tenuous respiratory status  - As of 11/6, hypoxemia has significantly improved    Plan:  - weaned to room air  - continue management of  underlying triggers with steroid and immunosuppressants  - incentive spirometry and respiratory hygiene    Lymphadenopathy of head and neck  - Has bilateral neck gland swelling with tenderness,consulted ENT  - No surgical intervention indicated   - Adenopathy likely reactive in nature   - Recommend further workup if it does not resolve within next 2 weeks     Acute blood loss anemia  In the setting of GI bleed with melena  - Evaluated by GI, see GI bleed documentation  - Last transfusion 10/28, Hgb slowly trending down since then  - Hgb 6.9 on 11/5      Plan  - Monitor CBC Daily   - Treat with pRBC transfusion PRN Hgb <7  - qualifies for transfusion on 11/5 with Hgb 6.9, 1u pRBC ordered  -stable       Chronic diastolic heart failure  Pulmonary Hypertension due to left heart disease  TTE (10/2022) EF 65%, G1DD  Home meds: Lisinopril, Toprol     No signs or symptoms to suggest acute exacerbation    Plan  - Active volume control with intermittent Hemodialysis per Nephrology   - Daily weights (standing if tolerated)  - Strict I/Os  - Fluid restriction 1.5 day  - Cardiac diet w/ 2 g Na restriction      Primary hypertension  BP Readings from Last 1 Encounters:   11/13/22 130/60     - Patient is unable to tolerate PO mediations, all meds to be administered via NG tube  -Will continue to monitor blood pressure trend closely and adjust antihypertensive regimen as clinically indicated and tolerated.    amLODIPine tablet 10 mg, 10 mg, Per NG tube, Daily    carvediloL tablet 12.5 mg, 12.5 mg, Per NG tube, BID    hydrALAZINE tablet 25 mg, 25 mg, Per NG tube, Q8H    lisinopriL tablet 40 mg, 40 mg, Per NG tube, Daily      Hypothyroid  - Continue synthroid 25 mcg  - TSH 0.585 10/27/22      VTE Risk Mitigation (From admission, onward)         Ordered     heparin, porcine (PF) 100 unit/mL injection flush 500 Units  As needed (PRN)         11/10/22 1430     heparin (porcine) injection 1,000 Units  As needed (PRN)         11/09/22  1601     heparin (porcine) injection 1,000 Units  As needed (PRN)         11/08/22 0854     Place sequential compression device  Until discontinued         10/25/22 1731     IP VTE HIGH RISK PATIENT  Once         10/17/22 1354     Reason for No Pharmacological VTE Prophylaxis  Once        Question:  Reasons:  Answer:  Risk of Bleeding    10/17/22 1354                Discharge Planning   ANTHONY: 11/16/2022     Code Status: Full Code   Is the patient medically ready for discharge?: No    Reason for patient still in hospital (select all that apply): Patient trending condition, Treatment, Consult recommendations and Pending disposition  Discharge Plan A: Skilled Nursing Facility   Discharge Delays: None known at this time              Danielle Palomino MD  Department of Hospital Medicine   [facility] - Intensive Care ([place]-)

## 2022-11-14 NOTE — PT/OT/SLP PROGRESS
"Occupational Therapy   Treatment    Name: Kristin Goodman  MRN: [id number]  Admitting Diagnosis:  Microscopic polyangiitis  Day of Surgery    Recommendations:     Discharge Recommendations: nursing facility, skilled  Discharge Equipment Recommendations:  bedside commode  Barriers to discharge:  None    Assessment:     Kristin Goodman is a 77 y.o. female with a medical diagnosis of Microscopic polyangiitis.  She presents with pleasant demeanor and willingness to participate despite fatigue. Performance deficits affecting function are weakness, impaired endurance, impaired self care skills, impaired functional mobility, impaired balance, decreased upper extremity function, decreased lower extremity function, decreased safety awareness.     Rehab Prognosis:  Good; patient would benefit from acute skilled OT services to address these deficits and reach maximum level of function.       Plan:     Patient to be seen 3 x/week to address the above listed problems via self-care/home management, therapeutic activities, therapeutic exercises, neuromuscular re-education  Plan of Care Expires: 11/20/22  Plan of Care Reviewed with: patient, daughter    Subjective   "Still feeling a little dizzy."    Pain/Comfort:  Pain Rating 1: 0/10  Pain Rating Post-Intervention 1: 0/10    Objective:     Communicated with: nurse prior to session.  Patient found supine with telemetry upon OT entry to room, daughter and nurse present.    General Precautions: Standard, fall   Orthopedic Precautions:N/A   Braces: N/A  Respiratory Status: Room air     Occupational Performance:     Bed Mobility:    Patient completed Rolling/Turning to Right with minimum assistance  Patient completed Supine to Sit with minimum assistance  Patient completed Sit to Supine with moderate assistance for BLE onto transport stretcher    Functional Mobility/Transfers:  Patient completed Sit <> Stand Transfer with moderate assistance  with  no assistive device and hand-held assist "   Pt performed side steps from EOB to bedside commode with ModA HHA and verbal cues  Pt t/f stand>sit>stand with ModA and increased time...upon stand Pt noted to have incontinence BM episode  Pt performed side steps from bedside commode to transport stretcher with Yonatan HHA    Activities of Daily Living:  Toileting: maximal assistance for perineal care g/h, 2* episode of BM incontinence in stance      [facility] 6 Click ADL: 15    Treatment & Education:  -Pt re ed role of OT per POC  -Pt provided theraputty (yellow) and HEP, unable to perform during today's therapy session, 2* transport to D...will attempt at next session  -Pt with multiple transfers performed throughout today's session and Pt offering assist as able...noted to be more fatigued in comparison to prior sessions, possibly due to tube feed holding    Patient left supine with  nurse and daughter present and being taken off unit on stretcher via transport services    GOALS:   Multidisciplinary Problems       Occupational Therapy Goals          Problem: Occupational Therapy    Goal Priority Disciplines Outcome Interventions   Occupational Therapy Goal     OT, PT/OT Ongoing, Progressing    Description: Goals to be met by: 11/15     Patient will increase functional independence with ADLs by performing:    UE Dressing with Modified [place].  LE Dressing with Modified [place].  Grooming while standing with Modified [place].  Toileting from toilet with Modified [place] for hygiene and clothing management.   Supine to sit with Modified [place].  Step transfer with Modified [place]  Toilet transfer to toilet with Modified [place].                         Time Tracking:     OT Date of Treatment: 11/14/22  OT Start Time: 0909  OT Stop Time: 0935  OT Total Time (min): 26 min    Billable Minutes:Therapeutic Activity 26    OT/SARAHI: SARAHI     SARAHI Visit Number: 2    11/14/2022

## 2022-11-14 NOTE — ASSESSMENT & PLAN NOTE
SLP following  MBSS showing global weakness in swallowing as well as aspiration with thin liquids.   ENT consulted but patient did not tolerate laryngoscopy     Plan   - Discussed case with Rheumatology team, they are concerned that this dysphagia may have been from prior stroke, requesting imaging for evaluation-  CT demonstrated no acute findings or causes for dysphagia NOR did MRI   - removed NGT and place dobhoff which is more comfortable and makes swallowing easier compared to NGT.    - continue working with speech therapy   -exam concerning for thrush; nystatin swish and swallow initiated; GI consulted as concern that oropharyngeal candidiasis may be contributing to dysphagia  -Per GI, Lower suspicion for esophageal candidiasis without odynophagia however can If white plaques have been seen on oropharynx, ok to start fluconazole empirically for possible esophageal candidiasis  -EGD on 11/14 without abnormality; per GI, Suspect some aspect of esophageal dysmotility in setting of critical illness. No further testing at this time.

## 2022-11-14 NOTE — PT/OT/SLP PROGRESS
Speech Language Pathology      Kristin SYD Goodman  MRN: [id number]    Patient not seen today secondary to Patient NPO for pending procedure upon attempt. ST to continue to monitor and f/u per ST POC.    11/14/2022

## 2022-11-14 NOTE — ANESTHESIA POSTPROCEDURE EVALUATION
Anesthesia Post Evaluation    Patient: Kristin Goodman    Procedure(s) Performed: Procedure(s) (LRB):  EGD (ESOPHAGOGASTRODUODENOSCOPY) (N/A)    Final Anesthesia Type: general      Patient location during evaluation: PACU  Patient participation: Yes- Able to Participate  Level of consciousness: awake and alert and oriented  Post-procedure vital signs: reviewed and stable  Pain management: adequate  Airway patency: patent    PONV status at discharge: No PONV  Anesthetic complications: no      Cardiovascular status: blood pressure returned to baseline and hemodynamically stable  Respiratory status: unassisted and spontaneous ventilation  Hydration status: euvolemic  Follow-up not needed.          Vitals Value Taken Time   /60 11/14/22 1156   Temp 36.4 °C (97.6 °F) 11/14/22 1156   Pulse 63 11/14/22 1252   Resp 12 11/14/22 1252   SpO2 89 % 11/14/22 1252   Vitals shown include unvalidated device data.      Event Time   Out of Recovery 11:20:00         Pain/Kathryn Score: Pain Rating Post Med Admin: 0 (11/13/2022 12:53 PM)  Kathryn Score: 10 (11/14/2022 10:45 AM)

## 2022-11-14 NOTE — PLAN OF CARE
ID band verified. Fall risk bank placed. Plan of care reviewed with patient. Patient verbalizes understanding and no questions at this time.

## 2022-11-14 NOTE — PROGRESS NOTES
"[facility] ([place] [address])  Rheumatology  Progress Note    Patient Name: Kristin Goodman  MRN: [id number]  Admission Date: 10/17/2022  Hospital Length of Stay: 28 days  Code Status: Full Code   Attending Provider: Danielle Palomino MD  Primary Care Physician: Lori Hernandez MD  Principal Problem: Microscopic polyangiitis    Subjective:     HPI: Patient is a 78 yo F with chronic diastolic heart failure, HTN, OA, who is admitted for respiratory failure. Rheumatology is consulted due to concern for vasculitis. Patient initially presented to the ED on 9/24/22 complaining of a week of cough followed by an episode of hemoptysis on 9/24 prompting the ED visit. They did a CTA which showed multifocal PNA. She was sent home with [facility]. She followed up with PCP on 10/4/22 and was feeling better. Then she presented to the ED again on 10/14 and on 10/17 complaining of dyspnea. She had fevers in the few days leading up to admission of 102. She endorsed weakness, productive cough, dyspnea, and loss of appetite. Pt initally at 88% O2 saturation and improved to 98% on 2L NC. She was admitted for IV antibiotics. CT chest with contrast on 10/17 showed evidence of interval progression of bilateral perihilar dense consolidation with peripheral patchy areas of ground-glass opacification nonspecific as to the etiology.  Bilateral pneumonia as well as inflammatory etiologies considered.  Cardiogenic pulmonary edema considered less likely given the pattern.    On 10/19/22 she started having melena. Hb dropped to 6. She got 2 units pRBCs. GI was consulted. They noted "Colonoscopy in 2020 showed internal hemorrhoids and mild sigmoid diverticulosis. EGD in 2012 showed gastritis, biopsies positive for H. Pylori." "We considered doing EGD now, but from a pulmonary standpoint I do not think she is stable enough with the current pneumonia, therefore would recommend that we just follow the patient, treat with a PPI in case there is " "any peptic ulcer disease."    On 10/21/22 ENT was consulted due to left sided otalgia the day prior which resolved. She also was complaining of cervical adenopathy and sore throat. They did not recommend surgical intervention and felt that adenopathy was likely reactive.     On 10/23/22 ID was consulted. They recommended checking respiratory infection panel and fungal markers.    On 10/23/22 Nephrology was consulted due to worsening creatinine. They noted, "Patient is a noted to have baseline creatinine of 1 as recent as 8/2022 but progressive worsening of creatinine in early October.  On admission patient with creatinine of 1.6 (10/17) with subsequent progression and creatinine of 3.0 at time of consultation (10/23).  Nephrology consulted for acute kidney injury." "Patient with ATN nephritic picture in urine sediment, prot/crea ratio pending. Had hematuria in recent past but in context with positive urine cultures. Now definitely more dysmorphic red cells that wbcs in the urine. 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    On 10/23/22, she was transferred to ICU due to desaturations and respiratory distress. Pulmonologist noted that her respiratory status is too tenuous for bronchoscopy. She was stabilized with non-invasive ventilation and nasal cannula oxygen.      Interval History: No acute events overnight. Patient s/p GI endoscopy today for indigestion after she restarted diet. Otherwise doing well. On room air.     Current Facility-Administered Medications   Medication Frequency    0.9%  NaCl infusion (for blood administration) Q24H PRN    0.9%  NaCl infusion (for blood administration) Q24H PRN    0.9%  NaCl infusion (for blood administration) Q24H PRN    0.9%  NaCl infusion Once    acetaminophen tablet 650 mg Q4H PRN    albuterol-ipratropium 2.5 mg-0.5 mg/3 mL nebulizer solution 3 " mL Q4H PRN    aluminum-magnesium hydroxide-simethicone 200-200-20 mg/5 mL suspension 30 mL QID PRN    amLODIPine tablet 10 mg Daily    atovaquone 750 mg/5 mL oral liquid 1,500 mg Daily    bisacodyL suppository 10 mg Daily PRN    carvediloL tablet 25 mg BID    cloNIDine tablet 0.1 mg Q6H PRN    dextrose 10% bolus 125 mL PRN    dextrose 10% bolus 250 mL PRN    glucagon (human recombinant) injection 1 mg PRN    glucose chewable tablet 16 g PRN    glucose chewable tablet 24 g PRN    heparin (porcine) injection 1,000 Units PRN    heparin, porcine (PF) 100 unit/mL injection flush 500 Units PRN    hydrALAZINE tablet 100 mg Q8H    insulin aspart U-100 pen 1-10 Units Q6H PRN    levothyroxine tablet 25 mcg Before breakfast    lisinopriL tablet 40 mg Daily    melatonin tablet 6 mg Nightly PRN    methocarbamoL tablet 500 mg TID PRN    methylPREDNISolone sodium succinate injection 20 mg Daily    naloxone 0.4 mg/mL injection 0.02 mg PRN    nystatin 100,000 unit/mL suspension 500,000 Units QID    ondansetron injection 4 mg Q8H PRN    pantoprazole injection 40 mg Q12H    prochlorperazine injection Soln 5 mg Q6H PRN    QUEtiapine tablet 25 mg QHS    simethicone chewable tablet 80 mg QID PRN    sodium chloride 0.9% 250 mL flush bag 1 time in [facility]    sodium chloride 0.9% flush 10 mL PRN    sodium chloride 0.9% flush 10 mL PRN    sodium chloride 0.9% flush 10 mL PRN    sodium chloride 0.9% flush 5 mL PRN     Facility-Administered Medications Ordered in Other Encounters   Medication Frequency    celecoxib capsule 400 mg Once    fentaNYL 50 mcg/mL injection  mcg PRN    LIDOcaine (PF) 10 mg/ml (1%) injection 10 mg Once PRN    LIDOcaine (PF) 10 mg/ml (1%) injection 10 mg Once    midazolam (VERSED) 1 mg/mL injection 0.5-4 mg PRN    ropivacaine 0.2% Nimbus PainPRO Pump infusion 500 ML Continuous     Objective:     Vital Signs (Most Recent):  Temp: 97.6 °F (36.4 °C) (11/14/22 1156)  Pulse: 68  (11/14/22 1156)  Resp: 20 (11/14/22 1156)  BP: 122/60 (11/14/22 1156)  SpO2: 97 % (11/14/22 1156)  O2 Device (Oxygen Therapy): room air (11/14/22 1115) Vital Signs (24h Range):  Temp:  [97.3 °F (36.3 °C)-98.9 °F (37.2 °C)] 97.6 °F (36.4 °C)  Pulse:  [67-84] 68  Resp:  [14-23] 20  SpO2:  [93 %-100 %] 97 %  BP: ()/(55-64) 122/60     Weight: 57.6 kg (127 lb) (11/14/22 0958)  Body mass index is 24 kg/m².  Body surface area is 1.57 meters squared.      Intake/Output Summary (Last 24 hours) at 11/14/2022 1339  Last data filed at 11/14/2022 1014  Gross per 24 hour   Intake 350 ml   Output --   Net 350 ml       Physical Exam   Constitutional: She is oriented to person, place, and time. normal appearance.  Non-toxic appearance. No distress. She does not appear ill.   HENT:   Head: Normocephalic and atraumatic.   Nose: Nose normal. No rhinorrhea or nasal congestion.   Mouth/Throat: Mucous membranes are moist. No oropharyngeal exudate or posterior oropharyngeal erythema.   Eyes: Pupils are equal, round, and reactive to light. Conjunctivae are normal. Right eye exhibits no discharge. Left eye exhibits no discharge. No scleral icterus.   Cardiovascular: Normal rate, regular rhythm, normal heart sounds and normal pulses. Exam reveals no gallop and no friction rub.   No murmur heard.  Pulmonary/Chest: Effort normal. No stridor. No respiratory distress. She has no wheezes. She has no rhonchi. She has no rales. She exhibits no tenderness.   Abdominal: Bowel sounds are normal. She exhibits no distension and no mass. There is no abdominal tenderness. There is no rebound and no guarding. No hernia.   Musculoskeletal:         General: No swelling, tenderness, deformity or signs of injury.   Neurological: She is alert and oriented to person, place, and time.   Skin: Skin is warm and dry. No bruising, no lesion and no rash noted. She is not diaphoretic. No erythema. No jaundice or pallor.   Vitals reviewed.    Significant Labs:  All  pertinent lab results from the last 24 hours have been reviewed.    Significant Imaging:  Imaging results within the past 24 hours have been reviewed.    Assessment/Plan:     Acute hypoxemic respiratory failure  Patient is a 78 yo F with chronic diastolic heart failure, HTN, OA, who is admitted for respiratory failure. Rheumatology is consulted due to concern for vasculitis. Patient presented with cough and hemoptysis since 9/24/22. She was admitted due to dyspnea and hypoxia on 10/17. She has had Hb drop to 6 this admission requiring blood transfusions. Reviewed her chest imaging which shows progressive disease from 10/14 until now which can be concerning for DAH. This might also explain the Hb drop. No bronch planned for now due to her tenuous respiratory status. S/p upper endoscopy with GI 11/14. She has had increasing creatinine from baseline of 1 to 3.0 on 10/23/22.     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   3)  CHANGES SUGGESTIVE OF FOCAL ACUTE PYELONEPHRITIS.   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     Treatment to date  - s/p IV Solumedrol 1000 mg x3 doses. Now following PEXIVAS steroid taper. Currently on IV Solumedrol 20 mg daily.  - PLEX 10/26, 10/27, 10/29, 10/30, 11/1, 11/2, 11/3 (prior to Rituxan)  - Got SLED 10/27. Bedside HD 10/30  - Rituximab 375 mg/m^2 (600 mg) on 10/27 (every 7 day dose 1 of 4), 11/3 (dose 2 of 4), 11/10 (dose 3 of 4)  - Cyclophosphamide 750 mg/m2 on 10/27 (every 14 day dose 1  of 2), 11/10 (dose 2 of 2)    Recommendations:  1. Continue steroid taper per PEXIVAS trial as in the chart below.Continue start Solu-Medrol 20 mg IV started on 11/6 for total of 14 days  2. Atovaquone 1500 mg and Protonix daily while on high dose steroids.  3. Next Rituximab 375 mg/m^2 due 11/17  4. S/p Cyclophosphamide 10/27 and 11/10  5. Monitor CBC and CMP in 10-14 days after giving chemotherapy                    Francisco J Ricci MD  Rheumatology  [facility] ([place]

## 2022-11-14 NOTE — ASSESSMENT & PLAN NOTE
Starting having hemoptysis and melena with associated acute blood loss anemia earlier in hospital stay. Patient with known internal hemorrhoids, mild sigmoid diverticulosis, history of gastritis and + H pylori (2012 EGD).   - GI consulted 10/19, unable to perform EGD due to instability and respiratory failure at the time    Plan  - patient unable to take PO PPI due to NGT removal on 11/5, transition to IV PPI 40mg pantoprazole daily, return to per NG tube once replaced  - s/p EGD 11/14; Normal Study  - PPI transitioned to BID as concern for GERD  - Monitor CBC closely for signs of recurrent bleed

## 2022-11-14 NOTE — SUBJECTIVE & OBJECTIVE
Interval History:   Patient seen and examined at bedside post EGD.    No acute events overnight.  Denies epigastric burning, nausea, vomiting, abdominal pain.  Notes no left, lateral neck pain or odynophagia.      Review of Systems   Constitutional:  Positive for fatigue. Negative for chills, diaphoresis and fever.   HENT:  Positive for trouble swallowing. Negative for congestion and sore throat.    Eyes:  Negative for visual disturbance.   Respiratory:  Negative for cough, shortness of breath and wheezing.    Cardiovascular:  Negative for chest pain, palpitations and leg swelling.   Gastrointestinal:  Negative for abdominal pain, nausea and vomiting.   Genitourinary:  Positive for decreased urine volume.   Musculoskeletal:  Negative for arthralgias and myalgias.   Skin:  Negative for rash.   Neurological:  Positive for weakness. Negative for dizziness, light-headedness and headaches.   Psychiatric/Behavioral:  Negative for confusion, decreased concentration and sleep disturbance.      Objective:     Vital Signs (Most Recent):  Temp: 97.6 °F (36.4 °C) (11/14/22 1156)  Pulse: 68 (11/14/22 1156)  Resp: 20 (11/14/22 1156)  BP: 122/60 (11/14/22 1156)  SpO2: 97 % (11/14/22 1156)   Vital Signs (24h Range):  Temp:  [97.3 °F (36.3 °C)-98.9 °F (37.2 °C)] 97.6 °F (36.4 °C)  Pulse:  [67-84] 68  Resp:  [14-23] 20  SpO2:  [93 %-100 %] 97 %  BP: ()/(55-64) 122/60     Weight: 57.6 kg (127 lb)  Body mass index is 24 kg/m².    Intake/Output Summary (Last 24 hours) at 11/14/2022 1446  Last data filed at 11/14/2022 1014  Gross per 24 hour   Intake 350 ml   Output --   Net 350 ml        Physical Exam  Vitals and nursing note reviewed.   Constitutional:       General: She is awake.      Appearance: She is well-developed. She is ill-appearing. She is not toxic-appearing or diaphoretic.   HENT:      Head: Normocephalic and atraumatic.      Right Ear: External ear normal.      Left Ear: External ear normal.      Nose:      Comments:  + NGT     Mouth/Throat:      Mouth: Mucous membranes are dry.   Eyes:      General: Lids are normal. No scleral icterus.        Right eye: No discharge.         Left eye: No discharge.   Cardiovascular:      Rate and Rhythm: Normal rate and regular rhythm.   Pulmonary:      Effort: Pulmonary effort is normal.      Breath sounds: Normal breath sounds. No wheezing or rhonchi.   Abdominal:      General: Bowel sounds are normal.      Palpations: Abdomen is soft.      Tenderness: There is no abdominal tenderness.   Musculoskeletal:         General: No deformity.      Cervical back: Normal range of motion and neck supple. No rigidity or tenderness.      Right lower leg: No edema.      Left lower leg: No edema.   Skin:     General: Skin is warm and dry.   Neurological:      General: No focal deficit present.      Mental Status: She is alert and oriented to person, place, and time. Mental status is at baseline.   Psychiatric:         Attention and Perception: Attention normal.         Behavior: Behavior normal.       Significant Labs: All pertinent labs within the past 24 hours have been reviewed.    Recent Results (from the past 24 hour(s))   POCT glucose    Collection Time: 11/13/22  5:47 PM   Result Value Ref Range    POCT Glucose 150 (H) 70 - 110 mg/dL   POCT glucose    Collection Time: 11/13/22 11:21 PM   Result Value Ref Range    POCT Glucose 103 70 - 110 mg/dL   POCT glucose    Collection Time: 11/14/22  4:01 AM   Result Value Ref Range    POCT Glucose 99 70 - 110 mg/dL   Magnesium    Collection Time: 11/14/22  4:06 AM   Result Value Ref Range    Magnesium 1.9 1.6 - 2.6 mg/dL   Phosphorus    Collection Time: 11/14/22  4:06 AM   Result Value Ref Range    Phosphorus 5.8 (H) 2.7 - 4.5 mg/dL   Basic metabolic panel    Collection Time: 11/14/22  4:06 AM   Result Value Ref Range    Sodium 134 (L) 136 - 145 mmol/L    Potassium 4.6 3.5 - 5.1 mmol/L    Chloride 95 95 - 110 mmol/L    CO2 24 23 - 29 mmol/L    Glucose 81 70 -  110 mg/dL    BUN 69 (H) 8 - 23 mg/dL    Creatinine 5.9 (H) 0.5 - 1.4 mg/dL    Calcium 7.8 (L) 8.7 - 10.5 mg/dL    Anion Gap 15 8 - 16 mmol/L    eGFR 7.0 (A) >60 mL/min/1.73 m^2   POCT glucose    Collection Time: 11/14/22  7:42 AM   Result Value Ref Range    POCT Glucose 90 70 - 110 mg/dL   POCT glucose    Collection Time: 11/14/22 12:16 PM   Result Value Ref Range    POCT Glucose 129 (H) 70 - 110 mg/dL         Significant Imaging: I have reviewed all pertinent imaging results/findings within the past 24 hours.    Imaging Results               CT Chest With Contrast (Final result)  Result time 10/17/22 14:26:36      Final result by Lacy Camp MD (10/17/22 14:26:36)                   Impression:      Interval progression of bilateral perihilar dense consolidation with peripheral patchy areas of ground-glass opacification nonspecific as to the etiology.  Bilateral pneumonia as well as inflammatory etiologies considered.  Cardiogenic pulmonary edema considered less likely given the pattern.    Borderline sized mediastinal lymph node appears stable.    No other interval detrimental changes since 10/14/2022.    This report was flagged in Epic as abnormal.      Electronically signed by: Lacy Camp  Date:    10/17/2022  Time:    14:26               Narrative:    EXAMINATION:  CT CHEST WITH CONTRAST    CLINICAL HISTORY:  Aspiration;    TECHNIQUE:  Low dose axial images, sagittal and coronal reformations were obtained from the thoracic inlet to the lung bases following the IV administration of 75 mL of Omnipaque 350.    COMPARISON:  CT chest without contrast 10/14/2022, CTA chest 09/24/2022    FINDINGS:  Base of Neck: No significant abnormality.    Thoracic soft tissues: Unremarkable.    Aorta: Left-sided aortic arch.  No aneurysm and no significant atherosclerosis.    Heart: Normal in size.  There is no convincing pericardial effusion with prior trace effusion no longer apparent.    Pulmonary vasculature:  Pulmonary arteries distribute normally with no visible significant central pulmonary thromboemboli.  Pulmonary artery caliber is normal.    Essence/Mediastinum: 9 mm in short axis lymph node in the AP window again noted.  No convincing new adenopathy.  Axillary shotty lymph nodes with fatty essence appear stable.    Airways: Patent.    Lungs/Pleura: Perihilar alveolar opacities with ground-glass component appear more prominent bilaterally as compared to the previous examination 10/14/2022 with dense consolidation centered in the perihilar region.  More patchy ground-glass opacities extend to the periphery.  There is no acute pleural effusion.    Esophagus: Unremarkable.    Upper Abdomen: Multiple low densities are again noted in the liver left lobe too small to characterize but likely cysts largest measuring 8 mm axial image 93 series 2.  There is no new abnormality of the partially imaged upper abdomen.    Bones: There is no acute fracture or suspicious lytic or sclerotic lesion involving the imaged chest wall osseous structures.  There is spondylosis and kyphosis of the imaged spine.  No subluxation.

## 2022-11-14 NOTE — SUBJECTIVE & OBJECTIVE
Interval History: No acute events overnight. Patient s/p negative GI endoscopy for indigestion. This mourning she reported abd discomfort that has now cleared after administration of IV PPI. Diet is progressing.     Current Facility-Administered Medications   Medication Frequency    0.9%  NaCl infusion (for blood administration) Q24H PRN    0.9%  NaCl infusion (for blood administration) Q24H PRN    0.9%  NaCl infusion (for blood administration) Q24H PRN    0.9%  NaCl infusion Once    acetaminophen tablet 650 mg Q4H PRN    albuterol-ipratropium 2.5 mg-0.5 mg/3 mL nebulizer solution 3 mL Q4H PRN    aluminum-magnesium hydroxide-simethicone 200-200-20 mg/5 mL suspension 30 mL QID PRN    amLODIPine tablet 10 mg Daily    atovaquone 750 mg/5 mL oral liquid 1,500 mg Daily    bisacodyL suppository 10 mg Daily PRN    carvediloL tablet 25 mg BID    cloNIDine tablet 0.1 mg Q6H PRN    dextrose 10% bolus 125 mL PRN    dextrose 10% bolus 250 mL PRN    glucagon (human recombinant) injection 1 mg PRN    glucose chewable tablet 16 g PRN    glucose chewable tablet 24 g PRN    heparin (porcine) injection 1,000 Units PRN    heparin, porcine (PF) 100 unit/mL injection flush 500 Units PRN    hydrALAZINE tablet 100 mg Q8H    insulin aspart U-100 pen 1-10 Units Q6H PRN    levothyroxine tablet 25 mcg Before breakfast    lisinopriL tablet 40 mg Daily    melatonin tablet 6 mg Nightly PRN    methocarbamoL tablet 500 mg TID PRN    methylPREDNISolone sodium succinate injection 20 mg Daily    naloxone 0.4 mg/mL injection 0.02 mg PRN    nystatin 100,000 unit/mL suspension 500,000 Units QID    ondansetron injection 4 mg Q8H PRN    pantoprazole injection 40 mg Q12H    prochlorperazine injection Soln 5 mg Q6H PRN    QUEtiapine tablet 25 mg QHS    simethicone chewable tablet 80 mg QID PRN    sodium chloride 0.9% 250 mL flush bag 1 time in Clinic/[facility]    sodium chloride 0.9% flush 10 mL PRN    sodium chloride 0.9% flush 10 mL PRN    sodium chloride 0.9%  flush 10 mL PRN    sodium chloride 0.9% flush 5 mL PRN     Facility-Administered Medications Ordered in Other Encounters   Medication Frequency    celecoxib capsule 400 mg Once    fentaNYL 50 mcg/mL injection  mcg PRN    LIDOcaine (PF) 10 mg/ml (1%) injection 10 mg Once PRN    LIDOcaine (PF) 10 mg/ml (1%) injection 10 mg Once    midazolam (VERSED) 1 mg/mL injection 0.5-4 mg PRN    ropivacaine 0.2% Nimbus PainPRO Pump infusion 500 ML Continuous     Objective:     Vital Signs (Most Recent):  Temp: 97.6 °F (36.4 °C) (11/14/22 1156)  Pulse: 68 (11/14/22 1156)  Resp: 20 (11/14/22 1156)  BP: 122/60 (11/14/22 1156)  SpO2: 97 % (11/14/22 1156)  O2 Device (Oxygen Therapy): room air (11/14/22 1115) Vital Signs (24h Range):  Temp:  [97.3 °F (36.3 °C)-98.9 °F (37.2 °C)] 97.6 °F (36.4 °C)  Pulse:  [67-84] 68  Resp:  [14-23] 20  SpO2:  [93 %-100 %] 97 %  BP: ()/(55-64) 122/60     Weight: 57.6 kg (127 lb) (11/14/22 0958)  Body mass index is 24 kg/m².  Body surface area is 1.57 meters squared.      Intake/Output Summary (Last 24 hours) at 11/14/2022 1339  Last data filed at 11/14/2022 1014  Gross per 24 hour   Intake 350 ml   Output --   Net 350 ml       Physical Exam   Constitutional: She is oriented to person, place, and time. normal appearance.  Non-toxic appearance. No distress. She does not appear ill.   HENT:   Head: Normocephalic and atraumatic.   Nose: Nose normal. No rhinorrhea or nasal congestion.   Mouth/Throat: Mucous membranes are moist. No oropharyngeal exudate or posterior oropharyngeal erythema.   Eyes: Pupils are equal, round, and reactive to light. Conjunctivae are normal. Right eye exhibits no discharge. Left eye exhibits no discharge. No scleral icterus.   Cardiovascular: Normal rate, regular rhythm, normal heart sounds and normal pulses. Exam reveals no gallop and no friction rub.   No murmur heard.  Pulmonary/Chest: Effort normal. No stridor. No respiratory distress. She has no wheezes. She has no  rhonchi. She has no rales. She exhibits no tenderness.   Abdominal: Bowel sounds are normal. She exhibits no distension and no mass. There is no abdominal tenderness. There is no rebound and no guarding. No hernia.   Musculoskeletal:         General: No swelling, tenderness, deformity or signs of injury.   Neurological: She is alert and oriented to person, place, and time.   Skin: Skin is warm and dry. No bruising, no lesion and no rash noted. She is not diaphoretic. No erythema. No jaundice or pallor.   Vitals reviewed.    Significant Labs:  All pertinent lab results from the last 24 hours have been reviewed.    Significant Imaging:  Imaging results within the past 24 hours have been reviewed.

## 2022-11-14 NOTE — ANESTHESIA PREPROCEDURE EVALUATION
11/14/2022  Pre-operative evaluation for * No procedures listed *    Kristin Goodman is a 77 y.o. 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 Now scheduled for EGD to evaluate dysphagia    TTE 10/2022   The left ventricle is normal in size with normal systolic function. The estimated ejection fraction is 65%.   Normal right ventricular size with normal right ventricular systolic function.   Grade I left ventricular diastolic dysfunction.   Mild tricuspid regurgitation.   There is pulmonary hypertension. The estimated PA systolic pressure is 56 mmHg.   Normal to low central venous pressure (3 mmHg).         Patient Active Problem List   Diagnosis    Hypothyroid    Primary hypertension    Hyperkalemia    Trochanteric bursitis of right hip    Mitral valve regurgitation    Chest pain    Syncope    Inflammatory spondylopathy    CAREY (dyspnea on exertion)    Chronic diastolic heart failure    Colon cancer screening    DONAVAN (obstructive sleep apnea)    Sleep arousal disorder    Acute medial meniscal tear, right, initial encounter    Decreased range of motion (ROM) of right knee    Quadriceps weakness    Weakness of right hip    Impaired mobility and ADLs    Acute medial meniscal injury of right knee    S/P meniscectomy    Atherosclerosis of renal artery    Pain in both wrists    Decreased range of motion of both wrists    Decreased  strength    Alteration in instrumental activities of daily living (IADL)    Acute blood loss anemia    Lymphadenopathy of head and neck    Acute hypoxemic respiratory failure    Microscopic polyangiitis    Acute renal failure with tubular necrosis    Moderate malnutrition    Dysphagia    Gastrointestinal hemorrhage with melena    Anuria    High risk medications (not anticoagulants)  long-term use    Gastric reflux    Pauci-immune RPGN (rapidly progressive glomerulonephritis)    Hematuria syndrome    Dependence on hemodialysis       Review of patient's allergies indicates:   Allergen Reactions    Ampicillin     Peaches [peach (prunus persica)] Other (See Comments)     Pt unable to state type of reaction. Information obtained from daughter who states she was informed of allergy from patient.    Penicillins      Other reaction(s): Hives    Sulfa (sulfonamide antibiotics) Rash and Hives       Current Facility-Administered Medications on File Prior to Visit   Medication Dose Route Frequency Provider Last Rate Last Admin    0.9%  NaCl infusion (for blood administration)   Intravenous Q24H PRN Madie Lancaster MD        0.9%  NaCl infusion (for blood administration)   Intravenous Q24H PRN Madie Lancaster MD        0.9%  NaCl infusion (for blood administration)   Intravenous Q24H PRN Immanuel Peres MD        0.9%  NaCl infusion   Intravenous Once Blue Puente MD   Held at 11/10/22 1715    acetaminophen tablet 650 mg  650 mg Oral Q4H PRN Magui Cage MD   650 mg at 11/07/22 1543    albuterol-ipratropium 2.5 mg-0.5 mg/3 mL nebulizer solution 3 mL  3 mL Nebulization Q4H PRN Kandace Smith PA-C   3 mL at 11/02/22 0746    aluminum-magnesium hydroxide-simethicone 200-200-20 mg/5 mL suspension 30 mL  30 mL Oral QID PRN Kandace Smith PA-C        amLODIPine tablet 10 mg  10 mg Per NG tube Daily Magui Cage MD   10 mg at 11/14/22 0851    atovaquone 750 mg/5 mL oral liquid 1,500 mg  1,500 mg Per NG tube Daily Magui Cage MD   1,500 mg at 11/14/22 0850    bisacodyL suppository 10 mg  10 mg Rectal Daily PRN Kandace Smith PA-C        carvediloL tablet 25 mg  25 mg Per NG tube BID Susi Urbina MD   25 mg at 11/14/22 0849    celecoxib capsule 400 mg  400 mg Oral Once Dieudonne Marshall MD        cloNIDine tablet 0.1 mg  0.1 mg Per NG tube Q6H  PRN Susi Urbina MD        dextrose 10% bolus 125 mL  12.5 g Intravenous PRN Immanuel Peres MD   Stopped at 11/08/22 2336    dextrose 10% bolus 250 mL  25 g Intravenous PRN Immanuel Peres MD        fentaNYL 50 mcg/mL injection  mcg   mcg Intravenous PRN Dieudonne Marshall MD   100 mcg at 12/21/21 0919    glucagon (human recombinant) injection 1 mg  1 mg Intramuscular PRN Magui Cage MD        glucose chewable tablet 16 g  16 g Oral PRN Kandace Smith PA-C        glucose chewable tablet 24 g  24 g Oral PRN Kandace Smith PA-C        heparin (porcine) injection 1,000 Units  1,000 Units Intra-Catheter PRN Blue Puente MD   1,000 Units at 11/11/22 1646    heparin, porcine (PF) 100 unit/mL injection flush 500 Units  500 Units Intravenous PRN Woo Palacio MD        hydrALAZINE tablet 100 mg  100 mg Per NG tube Q8H Susi Urbina MD   100 mg at 11/14/22 0530    insulin aspart U-100 pen 1-10 Units  1-10 Units Subcutaneous Q6H PRN Magui Cage MD   2 Units at 11/09/22 1738    levothyroxine tablet 25 mcg  25 mcg Per NG tube Before breakfast Magui Cage MD   25 mcg at 11/14/22 0530    LIDOcaine (PF) 10 mg/ml (1%) injection 10 mg  1 mL Intradermal Once PRN Dieudonne Marshall MD        LIDOcaine (PF) 10 mg/ml (1%) injection 10 mg  1 mL Intradermal Once Dieudonne Marshall MD        lisinopriL tablet 40 mg  40 mg Per NG tube Daily Magui Cage MD   40 mg at 11/14/22 0849    melatonin tablet 6 mg  6 mg Oral Nightly PRN Kandace Smith PA-C   6 mg at 11/11/22 2106    methocarbamoL tablet 500 mg  500 mg Oral TID PRN Kandace Smith PA-C   500 mg at 10/30/22 0058    methylPREDNISolone sodium succinate injection 20 mg  20 mg Intravenous Daily Danuta Neumann DO   20 mg at 11/14/22 0850    midazolam (VERSED) 1 mg/mL injection 0.5-4 mg  0.5-4 mg Intravenous PRN Dieudonne Marshall MD   2 mg at 12/21/21 0919    naloxone 0.4 mg/mL injection 0.02  mg  0.02 mg Intravenous PRN Kandace Smith PA-C        nystatin 100,000 unit/mL suspension 500,000 Units  500,000 Units Oral QID Danielle Palomino MD   500,000 Units at 11/14/22 0850    ondansetron injection 4 mg  4 mg Intravenous Q8H PRN Bev Daly MD   4 mg at 11/11/22 1426    pantoprazole injection 40 mg  40 mg Intravenous Q12H Danielle Palomino MD   40 mg at 11/14/22 0849    prochlorperazine injection Soln 5 mg  5 mg Intravenous Q6H PRN Kandace Smith PA-C   5 mg at 10/24/22 0001    QUEtiapine tablet 25 mg  25 mg Oral QHS Jonathon Dupree MD   25 mg at 11/13/22 2102    ropivacaine 0.2% Nimbus PainPRO Pump infusion 500 ML   Perineural Continuous Dieudonne Marshall MD   New Bag at 12/21/21 1153    simethicone chewable tablet 80 mg  1 tablet Oral QID PRN Kandace Smith PA-C   80 mg at 10/22/22 1133    sodium chloride 0.9% 250 mL flush bag   Intravenous 1 time in Clinic/[facility] Woo Palacio MD   Held at 10/27/22 1700    sodium chloride 0.9% flush 10 mL  10 mL Intravenous PRN Woo Palacio MD        sodium chloride 0.9% flush 10 mL  10 mL Intravenous PRN Woo Palacio MD        sodium chloride 0.9% flush 10 mL  10 mL Intravenous PRN Woo Palacio MD        sodium chloride 0.9% flush 5 mL  5 mL Intravenous PRN Kandace Smith PA-C         Current Outpatient Medications on File Prior to Visit   Medication Sig Dispense Refill    ACETAMINOPHEN (TYLENOL ARTHRITIS PAIN ORAL) Take 1 tablet by mouth once daily.       albuterol (VENTOLIN HFA) 90 mcg/actuation inhaler Inhale 2 puffs into the lungs every 6 (six) hours as needed for Shortness of Breath. Rescue 18 g 0    amLODIPine (NORVASC) 10 MG tablet Take 1 tablet (10 mg total) by mouth once daily. 90 tablet 3    aspirin 325 MG tablet Take 1 tablet at lunch daily starting after surgery for 6 weeks to prevent DVT. 42 tablet 0    diclofenac sodium (VOLTAREN) 1 % Gel Apply 2 g topically 4 (four) times daily. for 10 days 400 g  0    epinastine 0.05 % ophthalmic solution Place 1 drop into both eyes once daily.       EUTHYROX 25 mcg tablet Take 1 tablet by mouth once daily 90 tablet 0    fish,bora,flax oils-om3,6,9no1 (OMEGA 3-6-9) 1,200 mg Cap Take 1 each by mouth once daily. 30 capsule 3    fluticasone propionate (FLONASE) 50 mcg/actuation nasal spray 1 spray (50 mcg total) by Each Nostril route 2 (two) times daily. 16 g 0    FLUZONE HIGHDOSE QUAD 20-21  mcg/0.7 mL Syrg ADM 0.7ML IM UTD      hydrALAZINE (APRESOLINE) 100 MG tablet TAKE 1 TABLET BY MOUTH THREE TIMES DAILY 90 tablet 0    HYDROcodone-acetaminophen (NORCO) 5-325 mg per tablet Take 1 tablet by mouth every 6 (six) hours as needed.      ibuprofen (ADVIL,MOTRIN) 800 MG tablet Take 800 mg by mouth every 8 (eight) hours as needed.      levocetirizine (XYZAL) 5 MG tablet Take 1 tablet (5 mg total) by mouth every evening. For sinus 30 tablet 0    lisinopriL (PRINIVIL,ZESTRIL) 40 MG tablet Take 1 tablet by mouth once daily 90 tablet 0    meloxicam (MOBIC) 15 MG tablet Take 1 tablet (15 mg total) by mouth daily as needed (arthritis). 30 tablet 2    methocarbamoL (ROBAXIN) 500 MG Tab Take 1 tablet (500 mg total) by mouth 3 (three) times daily as needed (muscle spasms). 40 tablet 0    metoprolol succinate (TOPROL-XL) 50 MG 24 hr tablet Take 1 tablet by mouth once daily 90 tablet 0    mupirocin (BACTROBAN) 2 % ointment Apply topically 2 (two) times daily.      tiZANidine (ZANAFLEX) 4 MG tablet Take 1 tablet by mouth twice daily as needed 20 tablet 0       Past Surgical History:   Procedure Laterality Date    ARTHROSCOPIC CHONDROPLASTY OF KNEE JOINT Right 12/21/2021    Procedure: ARTHROSCOPY, KNEE, WITH CHONDROPLASTY;  Surgeon: Elly Sullivan MD;  Location: [facility] OR;  Service: Orthopedics;  Laterality: Right;    COLONOSCOPY N/A 9/28/2020    Procedure: COLONOSCOPY;  Surgeon: Jaylan Flynn MD;  Location: [facility] ENDO;  Service: Endoscopy;  Laterality: N/A;    KNEE  ARTHROSCOPY W/ MENISCECTOMY Right 2021    Procedure: ARTHROSCOPY, KNEE, WITH MENISCECTOMY;  Surgeon: Elly Sullivan MD;  Location: [facility] OR;  Service: Orthopedics;  Laterality: Right;  general, regional w catheter, adductor, josefina 50cc,     OOPHORECTOMY      SYNOVECTOMY OF KNEE Right 2021    Procedure: SYNOVECTOMY, KNEE;  Surgeon: Elly Sullivan MD;  Location: [facility] OR;  Service: Orthopedics;  Laterality: Right;    TOTAL ABDOMINAL HYSTERECTOMY      19 yrs ago       Social History     Socioeconomic History    Marital status:    Tobacco Use    Smoking status: Former     Packs/day: 0.50     Years: 6.00     Pack years: 3.00     Types: Cigarettes    Smokeless tobacco: Never    Tobacco comments:     Quit ~ 30 years ago   Substance and Sexual Activity    Alcohol use: No    Drug use: No    Sexual activity: Never     Partners: Male         CBC:   Recent Labs     22  0631 22  0233   WBC 6.71 6.76   RBC 2.49* 2.57*   HGB 7.3* 7.5*   HCT 22.8* 23.3*   * 114*   MCV 92 91   MCH 29.3 29.2   MCHC 32.0 32.2       CMP:   Recent Labs     22  0631 22  0233 22  0406    138 134*   K 3.6 3.9 4.6    97 95   CO2 29 28 24   BUN 45* 56* 69*   CREATININE 3.4* 4.6* 5.9*   GLU 90 88 81   MG 1.7 1.8 1.9   PHOS 4.4 4.9* 5.8*   CALCIUM 7.5* 7.5* 7.8*   ALBUMIN 2.3* 2.4*  --    PROT 4.5* 4.6*  --    ALKPHOS 58 60  --    ALT 21 19  --    AST 17 16  --    BILITOT 0.5 0.6  --        INR  No results for input(s): PT, INR, PROTIME, APTT in the last 72 hours.        Diagnostic Studies:      EKD Echo:  No results found for this or any previous visit.      Pre-op Assessment    I have reviewed the Patient Summary Reports.     I have reviewed the Nursing Notes. I have reviewed the NPO Status.   I have reviewed the Medications.     Review of Systems  Cardiovascular:   Hypertension    Pulmonary:   Pneumonia Shortness of breath Sleep Apnea    Renal/:   Chronic Renal Disease     Endocrine:   Hypothyroidism        Physical Exam  General: Somnolent    Airway:  Mallampati: II   Mouth Opening: Normal  TM Distance: Normal  Tongue: Normal  Neck ROM: Normal ROM    Chest/Lungs:  Clear to auscultation  On 10L O2  Heart:  Rate: Normal  Rhythm: Regular Rhythm  Sounds: Normal        Anesthesia Plan  Type of Anesthesia, risks & benefits discussed:    Anesthesia Type: Gen Natural Airway  Intra-op Monitoring Plan: Standard ASA Monitors  Post Op Pain Control Plan: multimodal analgesia  Induction:  IV  Airway Plan: , Post-Induction  Informed Consent: Informed consent signed with the Patient and all parties understand the risks and agree with anesthesia plan.  All questions answered.   ASA Score: 2    Ready For Surgery From Anesthesia Perspective.     .

## 2022-11-14 NOTE — NURSING TRANSFER
Nursing Transfer Note      11/14/2022     Reason patient is being transferred: meets criteria    Transfer To: 14 W    Transfer via stretcher    Transfer with rm feed    Transported by PCT    Medicines sent: one    Any special needs or follow-up needed: none    Chart send with patient: Yes      Patient reassessed at:1100

## 2022-11-14 NOTE — TREATMENT PLAN
GI Post-Procedure Treatment Plan    EGD complete    Findings:        The examined esophagus was normal.        The entire examined stomach was normal. 12 Fr nasogastric tube        placed via the right nares.        The examined duodenum was normal.     Impression:            - Normal Study. NG tube replaced.     Recommendation:        - Return patient to [facility] for ongoing care.                          - Resume previous diet.                          - Continue present medications.     Suspect some aspect of esophageal dysmotility in setting of critical illness. No further testing at this time.    Gilson Larry  Gastroenterology Fellow, PGY-IV

## 2022-11-15 LAB
ANION GAP SERPL CALC-SCNC: 8 MMOL/L (ref 8–16)
APTT IMM NP PPP: NORMAL SEC (ref 32–48)
APTT P HEP NEUT PPP: NORMAL SEC (ref 32–48)
BASOPHILS # BLD AUTO: 0.01 K/UL (ref 0–0.2)
BASOPHILS NFR BLD: 0.1 % (ref 0–1.9)
BUN SERPL-MCNC: 84 MG/DL (ref 8–23)
CALCIUM SERPL-MCNC: 7.7 MG/DL (ref 8.7–10.5)
CHLORIDE SERPL-SCNC: 97 MMOL/L (ref 95–110)
CO2 SERPL-SCNC: 26 MMOL/L (ref 23–29)
CONFIRM APTT STACLOT: NORMAL
CREAT SERPL-MCNC: 6.5 MG/DL (ref 0.5–1.4)
DIFFERENTIAL METHOD: ABNORMAL
DRVVT SCREEN TO CONFIRM RATIO: NORMAL RATIO
EOSINOPHIL # BLD AUTO: 0 K/UL (ref 0–0.5)
EOSINOPHIL NFR BLD: 0.1 % (ref 0–8)
ERYTHROCYTE [DISTWIDTH] IN BLOOD BY AUTOMATED COUNT: 15.5 % (ref 11.5–14.5)
EST. GFR  (NO RACE VARIABLE): 6.2 ML/MIN/1.73 M^2
GLUCOSE SERPL-MCNC: 102 MG/DL (ref 70–110)
HAV IGM SERPL QL IA: NORMAL
HBV CORE AB SERPL QL IA: NORMAL
HBV SURFACE AB SER-ACNC: 308.24 MIU/ML
HBV SURFACE AB SER-ACNC: REACTIVE M[IU]/ML
HBV SURFACE AG SERPL QL IA: NORMAL
HCT VFR BLD AUTO: 22.4 % (ref 37–48.5)
HGB BLD-MCNC: 7.1 G/DL (ref 12–16)
IMM GRANULOCYTES # BLD AUTO: 0.04 K/UL (ref 0–0.04)
IMM GRANULOCYTES NFR BLD AUTO: 0.5 % (ref 0–0.5)
LA 3 SCREEN W REFLEX-IMP: NORMAL
LA NT DPL PPP QL: NORMAL
LYMPHOCYTES # BLD AUTO: 0.9 K/UL (ref 1–4.8)
LYMPHOCYTES NFR BLD: 10.5 % (ref 18–48)
MAGNESIUM SERPL-MCNC: 1.9 MG/DL (ref 1.6–2.6)
MCH RBC QN AUTO: 28.7 PG (ref 27–31)
MCHC RBC AUTO-ENTMCNC: 31.7 G/DL (ref 32–36)
MCV RBC AUTO: 91 FL (ref 82–98)
MIXING DRVVT: NORMAL SEC (ref 33–44)
MONOCYTES # BLD AUTO: 0.4 K/UL (ref 0.3–1)
MONOCYTES NFR BLD: 5.3 % (ref 4–15)
NEUTROPHILS # BLD AUTO: 6.8 K/UL (ref 1.8–7.7)
NEUTROPHILS NFR BLD: 83.5 % (ref 38–73)
NRBC BLD-RTO: 0 /100 WBC
PHOSPHATE SERPL-MCNC: 6.3 MG/DL (ref 2.7–4.5)
PLATELET # BLD AUTO: 201 K/UL (ref 150–450)
PMV BLD AUTO: 11.3 FL (ref 9.2–12.9)
POCT GLUCOSE: 112 MG/DL (ref 70–110)
POCT GLUCOSE: 129 MG/DL (ref 70–110)
POCT GLUCOSE: 192 MG/DL (ref 70–110)
POCT GLUCOSE: 98 MG/DL (ref 70–110)
POTASSIUM SERPL-SCNC: 4.1 MMOL/L (ref 3.5–5.1)
PROTHROMBIN TIME: 14.5 SEC (ref 12–15.5)
RBC # BLD AUTO: 2.47 M/UL (ref 4–5.4)
REPTILASE TIME: NORMAL SEC
SCREEN APTT: 48 SEC (ref 32–48)
SCREEN DRVVT: 42 SEC (ref 33–44)
SODIUM SERPL-SCNC: 131 MMOL/L (ref 136–145)
THROMBIN TIME: NORMAL SEC (ref 14.7–19.5)
WBC # BLD AUTO: 8.1 K/UL (ref 3.9–12.7)

## 2022-11-15 PROCEDURE — 92507 TX SP LANG VOICE COMM INDIV: CPT

## 2022-11-15 PROCEDURE — 25000003 PHARM REV CODE 250: Performed by: STUDENT IN AN ORGANIZED HEALTH CARE EDUCATION/TRAINING PROGRAM

## 2022-11-15 PROCEDURE — 80048 BASIC METABOLIC PNL TOTAL CA: CPT | Performed by: STUDENT IN AN ORGANIZED HEALTH CARE EDUCATION/TRAINING PROGRAM

## 2022-11-15 PROCEDURE — 20600001 HC STEP DOWN PRIVATE ROOM

## 2022-11-15 PROCEDURE — 99900035 HC TECH TIME PER 15 MIN (STAT)

## 2022-11-15 PROCEDURE — 25000003 PHARM REV CODE 250: Performed by: HOSPITALIST

## 2022-11-15 PROCEDURE — 94660 CPAP INITIATION&MGMT: CPT

## 2022-11-15 PROCEDURE — 99232 SBSQ HOSP IP/OBS MODERATE 35: CPT | Mod: GC,,, | Performed by: INTERNAL MEDICINE

## 2022-11-15 PROCEDURE — 63600175 PHARM REV CODE 636 W HCPCS: Performed by: STUDENT IN AN ORGANIZED HEALTH CARE EDUCATION/TRAINING PROGRAM

## 2022-11-15 PROCEDURE — 87340 HEPATITIS B SURFACE AG IA: CPT | Performed by: STUDENT IN AN ORGANIZED HEALTH CARE EDUCATION/TRAINING PROGRAM

## 2022-11-15 PROCEDURE — C9113 INJ PANTOPRAZOLE SODIUM, VIA: HCPCS | Performed by: STUDENT IN AN ORGANIZED HEALTH CARE EDUCATION/TRAINING PROGRAM

## 2022-11-15 PROCEDURE — 92526 ORAL FUNCTION THERAPY: CPT

## 2022-11-15 PROCEDURE — 83735 ASSAY OF MAGNESIUM: CPT

## 2022-11-15 PROCEDURE — 99232 PR SUBSEQUENT HOSPITAL CARE,LEVL II: ICD-10-PCS | Mod: ,,, | Performed by: STUDENT IN AN ORGANIZED HEALTH CARE EDUCATION/TRAINING PROGRAM

## 2022-11-15 PROCEDURE — 94761 N-INVAS EAR/PLS OXIMETRY MLT: CPT

## 2022-11-15 PROCEDURE — 97535 SELF CARE MNGMENT TRAINING: CPT

## 2022-11-15 PROCEDURE — 36415 COLL VENOUS BLD VENIPUNCTURE: CPT

## 2022-11-15 PROCEDURE — 63600175 PHARM REV CODE 636 W HCPCS: Performed by: INTERNAL MEDICINE

## 2022-11-15 PROCEDURE — 86706 HEP B SURFACE ANTIBODY: CPT | Mod: 91 | Performed by: STUDENT IN AN ORGANIZED HEALTH CARE EDUCATION/TRAINING PROGRAM

## 2022-11-15 PROCEDURE — 86709 HEPATITIS A IGM ANTIBODY: CPT | Performed by: STUDENT IN AN ORGANIZED HEALTH CARE EDUCATION/TRAINING PROGRAM

## 2022-11-15 PROCEDURE — 86704 HEP B CORE ANTIBODY TOTAL: CPT | Performed by: STUDENT IN AN ORGANIZED HEALTH CARE EDUCATION/TRAINING PROGRAM

## 2022-11-15 PROCEDURE — 97116 GAIT TRAINING THERAPY: CPT

## 2022-11-15 PROCEDURE — 99232 SBSQ HOSP IP/OBS MODERATE 35: CPT | Mod: ,,, | Performed by: STUDENT IN AN ORGANIZED HEALTH CARE EDUCATION/TRAINING PROGRAM

## 2022-11-15 PROCEDURE — 99232 SBSQ HOSP IP/OBS MODERATE 35: CPT | Mod: ,,, | Performed by: HOSPITALIST

## 2022-11-15 PROCEDURE — 99232 PR SUBSEQUENT HOSPITAL CARE,LEVL II: ICD-10-PCS | Mod: GC,,, | Performed by: INTERNAL MEDICINE

## 2022-11-15 PROCEDURE — 85025 COMPLETE CBC W/AUTO DIFF WBC: CPT | Performed by: STUDENT IN AN ORGANIZED HEALTH CARE EDUCATION/TRAINING PROGRAM

## 2022-11-15 PROCEDURE — 25000003 PHARM REV CODE 250

## 2022-11-15 PROCEDURE — 84100 ASSAY OF PHOSPHORUS: CPT

## 2022-11-15 PROCEDURE — 99232 PR SUBSEQUENT HOSPITAL CARE,LEVL II: ICD-10-PCS | Mod: ,,, | Performed by: HOSPITALIST

## 2022-11-15 RX ORDER — LIDOCAINE HYDROCHLORIDE 20 MG/ML
15 SOLUTION OROPHARYNGEAL ONCE
Status: DISCONTINUED | OUTPATIENT
Start: 2022-11-15 | End: 2022-11-30

## 2022-11-15 RX ORDER — MAG HYDROX/ALUMINUM HYD/SIMETH 200-200-20
30 SUSPENSION, ORAL (FINAL DOSE FORM) ORAL ONCE
Status: DISCONTINUED | OUTPATIENT
Start: 2022-11-15 | End: 2022-11-30

## 2022-11-15 RX ADMIN — METHYLPREDNISOLONE SODIUM SUCCINATE 20 MG: 40 INJECTION, POWDER, FOR SOLUTION INTRAMUSCULAR; INTRAVENOUS at 10:11

## 2022-11-15 RX ADMIN — LEVOTHYROXINE SODIUM 25 MCG: 25 TABLET ORAL at 05:11

## 2022-11-15 RX ADMIN — NYSTATIN 500000 UNITS: 500000 SUSPENSION ORAL at 09:11

## 2022-11-15 RX ADMIN — ATOVAQUONE 1500 MG: 750 SUSPENSION ORAL at 10:11

## 2022-11-15 RX ADMIN — ONDANSETRON 4 MG: 2 INJECTION INTRAMUSCULAR; INTRAVENOUS at 09:11

## 2022-11-15 RX ADMIN — HYDRALAZINE HYDROCHLORIDE 100 MG: 50 TABLET ORAL at 09:11

## 2022-11-15 RX ADMIN — HYDRALAZINE HYDROCHLORIDE 100 MG: 50 TABLET ORAL at 02:11

## 2022-11-15 RX ADMIN — CARVEDILOL 25 MG: 25 TABLET, FILM COATED ORAL at 09:11

## 2022-11-15 RX ADMIN — PANTOPRAZOLE SODIUM 40 MG: 40 INJECTION, POWDER, FOR SOLUTION INTRAVENOUS at 10:11

## 2022-11-15 RX ADMIN — PANTOPRAZOLE SODIUM 40 MG: 40 INJECTION, POWDER, FOR SOLUTION INTRAVENOUS at 09:11

## 2022-11-15 RX ADMIN — QUETIAPINE FUMARATE 25 MG: 25 TABLET ORAL at 09:11

## 2022-11-15 RX ADMIN — HYDRALAZINE HYDROCHLORIDE 100 MG: 50 TABLET ORAL at 05:11

## 2022-11-15 NOTE — PROGRESS NOTES
Pt had an EGD this am which was normal, ,however they did put a smaller feeding tube in.  Pt stated she has had minimal indigestion today and ate a few bites of her dinner without complaint.  Pt vitals signs and blood sugars were within normal range.

## 2022-11-15 NOTE — ASSESSMENT & PLAN NOTE
"Patient with baseline creatinine of 1 as recent as August 2022 with progressive worsening beginning in early October 1.3 (10/13)->1.6 (10/17)->3.0 (10/23) despite IV fluids.  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  Patient noted to have new onset proteinuria and hematuria over the last 1 month.  Additionally, would consider ATN in the setting of suspected sepsis from multifocal pneumonia.      Concerns for glomerulonephritis given patient's current clinical picture. Initial urine microscopy demonstrating muddy brown casts with likely acanthocytes noted as well. MITUL positive, proteinase 3 ab weakly positive, MPO strongly positive. Patient s/p high-dose IV solumedrol x3 doses, now on Solumedrol 50mg qd. Underwent kidney bx 10/27. Rheumatology consulted, and patient started on Plex, Ritux/cytoxan. Given continually worsening renal function and uremic encephalopathy, patient underwent SLED without complication on 10/27. Transferred to floor on 10/29. Significantly improved mentation on 10/31. Underwent HD 11/1. Patient also with increasing hypernatremia so added FWF to tube feeds.      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 Second dose Ritux on 11/3. Next dose of cytoxan on 11/10. Will discuss with Rheum regarding maintenance dosing.      Recommendations:  - Reported return of urine output, however no recorded   Hold HD and do not place permacath at this time. Hopeful for renal recovery  - will assess for HD needs tomorrow 11/16  - continue solumedrol 20 mg qd until 11/20, then 15 for 14 days, then 12.5 for 14 days, then 10 for 14 days, then 7.5 for 14 days then possibly life long 5 mg. (per PEXIVAS trial)  - strict intake and output  - renally dose medications and avoid " nephrotoxins when possible  - replete electrolytes as appropriate

## 2022-11-15 NOTE — PROGRESS NOTES
[facility] ([place] [id number])  Nephrology  Progress Note    Patient Name: Kristin Goodman  MRN: [id number]  Admission Date: 10/17/2022  Hospital Length of Stay: 29 days  Attending Provider: Danielle Palomino MD   Primary Care Physician: Lori Hernandez MD  Principal Problem:Microscopic polyangiitis    Subjective:     HPI: Kristin Goodman is a 77 year old female with hypertension, hypothyroidism, HFpEF who presents for cough, shortness of breath, and weakness.  Patient initially presented to ER on 09/24 with hemoptysis and imaging concerning for multilobar pneumonia at which time she was discharged on a 10 day course of levofloxacin.  Patient initially had some improvement but began having worsening symptoms on 10/14.  Patient describes multiple fevers and progressive shortness of breath.  She continues to have intermittent hemoptysis.  Patient with worsening leukocytosis and limited clinical improvement despite broad-spectrum antibiotics.  She remains on cefepime.  Patient is a noted to have baseline creatinine of 1 as recent as 8/2022 but progressive worsening of creatinine in early October.  On admission patient with creatinine of 1.6 (10/17) with subsequent progression and creatinine of 3.0 at time of consultation (10/23).  Nephrology consulted for acute kidney injury.      Interval History: continues to have unrecorded voids, serologies worsening. No uremic signs      Review of patient's allergies indicates:   Allergen Reactions    Ampicillin     Peaches [peach (prunus persica)] Other (See Comments)     Pt unable to state type of reaction. Information obtained from daughter who states she was informed of allergy from patient.    Penicillins      Other reaction(s): Hives    Sulfa (sulfonamide antibiotics) Rash and Hives     Current Facility-Administered Medications   Medication Frequency    0.9%  NaCl infusion (for blood administration) Q24H PRN    0.9%  NaCl infusion Once    acetaminophen tablet 650  mg Q4H PRN    albuterol-ipratropium 2.5 mg-0.5 mg/3 mL nebulizer solution 3 mL Q4H PRN    aluminum-magnesium hydroxide-simethicone 200-200-20 mg/5 mL suspension 30 mL QID PRN    aluminum-magnesium hydroxide-simethicone 200-200-20 mg/5 mL suspension 30 mL Once    And    LIDOcaine HCl 2% oral solution 15 mL Once    amLODIPine tablet 10 mg Daily    atovaquone 750 mg/5 mL oral liquid 1,500 mg Daily    bisacodyL suppository 10 mg Daily PRN    carvediloL tablet 25 mg BID    cloNIDine tablet 0.1 mg Q6H PRN    dextrose 10% bolus 125 mL PRN    dextrose 10% bolus 250 mL PRN    glucagon (human recombinant) injection 1 mg PRN    glucose chewable tablet 16 g PRN    glucose chewable tablet 24 g PRN    heparin (porcine) injection 1,000 Units PRN    heparin, porcine (PF) 100 unit/mL injection flush 500 Units PRN    hydrALAZINE tablet 100 mg Q8H    insulin aspart U-100 pen 1-10 Units Q6H PRN    levothyroxine tablet 25 mcg Before breakfast    lisinopriL tablet 40 mg Daily    melatonin tablet 6 mg Nightly PRN    methocarbamoL tablet 500 mg TID PRN    methylPREDNISolone sodium succinate injection 20 mg Daily    naloxone 0.4 mg/mL injection 0.02 mg PRN    nystatin 100,000 unit/mL suspension 500,000 Units QID    ondansetron injection 4 mg Q8H PRN    pantoprazole injection 40 mg Q12H    prochlorperazine injection Soln 5 mg Q6H PRN    QUEtiapine tablet 25 mg QHS    simethicone chewable tablet 80 mg QID PRN    sodium chloride 0.9% 250 mL flush bag 1 [facility] in [facility]    sodium chloride 0.9% flush 10 mL PRN    sodium chloride 0.9% flush 10 mL PRN    sodium chloride 0.9% flush 10 mL PRN    sodium chloride 0.9% flush 5 mL PRN     Facility-Administered Medications Ordered in Other Encounters   Medication Frequency    celecoxib capsule 400 mg Once    fentaNYL 50 mcg/mL injection  mcg PRN    LIDOcaine (PF) 10 mg/ml (1%) injection 10 mg Once PRN    LIDOcaine (PF) 10 mg/ml (1%) injection 10 mg Once     midazolam (VERSED) 1 mg/mL injection 0.5-4 mg PRN    ropivacaine 0.2% [facility] PainPRO Pump infusion 500 ML Continuous       Objective:     Vital Signs (Most Recent):  Temp: 98.3 °F (36.8 °C) (11/15/22 1211)  Pulse: 72 (11/15/22 1211)  Resp: 18 (11/15/22 1211)  BP: 132/60 (11/15/22 1211)  SpO2: (!) 93 % (11/15/22 1211)  O2 Device (Oxygen Therapy): room air (11/15/22 0033)   Vital Signs (24h Range):  Temp:  [97.4 °F (36.3 °C)-99.5 °F (37.5 °C)] 98.3 °F (36.8 °C)  Pulse:  [64-79] 72  Resp:  [14-19] 18  SpO2:  [93 %-100 %] 93 %  BP: (108-138)/(56-63) 132/60     Weight: 57.6 kg (127 lb) (11/14/22 0958)  Body mass index is 24 kg/m².  Body surface area is 1.57 meters squared.    I/O last 3 completed shifts:  In: 1938 [P.O.:120; Other:525; NG/GT:1143; IV Piggyback:150]  Out: -     Physical Exam  Vitals and nursing note reviewed.   Constitutional:       General: She is awake.      Appearance: She is well-developed. She is ill-appearing. She is not toxic-appearing or diaphoretic.   HENT:      Head: Normocephalic and atraumatic.      Right Ear: External ear normal.      Left Ear: External ear normal.      Nose:      Comments: + NGT     Mouth/Throat:      Mouth: Mucous membranes are dry.   Eyes:      General: Lids are normal. No scleral icterus.        Right eye: No discharge.         Left eye: No discharge.   Cardiovascular:      Rate and Rhythm: Normal rate and regular rhythm.   Pulmonary:      Effort: Pulmonary effort is normal.      Breath sounds: Normal breath sounds. No wheezing or rhonchi.   Abdominal:      General: Bowel sounds are normal.      Palpations: Abdomen is soft.      Tenderness: There is no abdominal tenderness.   Musculoskeletal:         General: No deformity.      Cervical back: Normal range of motion and neck supple. No rigidity or tenderness.      Right lower leg: No edema.      Left lower leg: No edema.   Skin:     General: Skin is warm and dry.   Neurological:      General: No focal deficit present.      " Mental Status: She is alert and oriented to person, place, and time. Mental status is at baseline.   Psychiatric:         Attention and Perception: Attention normal.         Behavior: Behavior normal.       Significant Labs:  All labs within the past 24 hours have been reviewed.     Significant Imaging:  Labs: Reviewed    Assessment/Plan:     Acute renal failure with tubular necrosis  Patient with baseline creatinine of 1 as recent as August 2022 with progressive worsening beginning in early October 1.3 (10/13)->1.6 (10/17)->3.0 (10/23) despite IV fluids.  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  Patient noted to have new onset proteinuria and hematuria over the last 1 month.  Additionally, would consider ATN in the setting of suspected sepsis from multifocal pneumonia.      Concerns for glomerulonephritis given patient's current clinical picture. Initial urine microscopy demonstrating muddy brown casts with likely acanthocytes noted as well. MITUL positive, proteinase 3 ab weakly positive, MPO strongly positive. Patient s/p high-dose IV solumedrol x3 doses, now on Solumedrol 50mg qd. Underwent kidney bx 10/27. Rheumatology consulted, and patient started on Plex, Ritux/cytoxan. Given continually worsening renal function and uremic encephalopathy, patient underwent SLED without complication on 10/27. Transferred to floor on 10/29. Significantly improved mentation on 10/31. Underwent HD 11/1. Patient also with increasing hypernatremia so added FWF to tube feeds.      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 Second dose Ritux on 11/3. Next dose of cytoxan on 11/10. Will discuss with Rheum regarding maintenance dosing.      Recommendations:  - Reported return of urine output, however " no recorded   Hold HD and do not place permacath at this time. Hopeful for renal recovery  - will assess for HD needs tomorrow 11/16  - continue solumedrol 20 mg qd until 11/20, then 15 for 14 days, then 12.5 for 14 days, then 10 for 14 days, then 7.5 for 14 days then possibly life long 5 mg. (per PEXIVAS trial)  - strict intake and output  - renally dose medications and avoid nephrotoxins when possible  - replete electrolytes as appropriate        Thank you for your consult. I will follow-up with patient. Please contact us if you have any additional questions.    Blue Puente MD  Nephrology  [facility] ([place]

## 2022-11-15 NOTE — PLAN OF CARE
11/15/22 1126   Post-Acute Status   Post-Acute Authorization Other   Post-Acute Placement Status Patient List Provided     WYATT sent referral to [facility] - [phone]; fax [phone]. WYATT will follow.        Irene Ribera LMSW  PRN - [facility]  [id number]

## 2022-11-15 NOTE — SUBJECTIVE & OBJECTIVE
Interval History: reports having a few unrecorded voids. Alert and oriented    Review of patient's allergies indicates:   Allergen Reactions    Ampicillin     Peaches [peach (prunus persica)] Other (See Comments)     Pt unable to state type of reaction. Information obtained from daughter who states she was informed of allergy from patient.    Penicillins      Other reaction(s): Hives    Sulfa (sulfonamide antibiotics) Rash and Hives     Current Facility-Administered Medications   Medication Frequency    0.9%  NaCl infusion (for blood administration) Q24H PRN    0.9%  NaCl infusion (for blood administration) Q24H PRN    0.9%  NaCl infusion (for blood administration) Q24H PRN    0.9%  NaCl infusion Once    acetaminophen tablet 650 mg Q4H PRN    albuterol-ipratropium 2.5 mg-0.5 mg/3 mL nebulizer solution 3 mL Q4H PRN    aluminum-magnesium hydroxide-simethicone 200-200-20 mg/5 mL suspension 30 mL QID PRN    amLODIPine tablet 10 mg Daily    atovaquone 750 mg/5 mL oral liquid 1,500 mg Daily    bisacodyL suppository 10 mg Daily PRN    carvediloL tablet 25 mg BID    cloNIDine tablet 0.1 mg Q6H PRN    dextrose 10% bolus 125 mL PRN    dextrose 10% bolus 250 mL PRN    glucagon (human recombinant) injection 1 mg PRN    glucose chewable tablet 16 g PRN    glucose chewable tablet 24 g PRN    heparin (porcine) injection 1,000 Units PRN    heparin, porcine (PF) 100 unit/mL injection flush 500 Units PRN    hydrALAZINE tablet 100 mg Q8H    insulin aspart U-100 pen 1-10 Units Q6H PRN    levothyroxine tablet 25 mcg Before breakfast    lisinopriL tablet 40 mg Daily    melatonin tablet 6 mg Nightly PRN    methocarbamoL tablet 500 mg TID PRN    methylPREDNISolone sodium succinate injection 20 mg Daily    naloxone 0.4 mg/mL injection 0.02 mg PRN    nystatin 100,000 unit/mL suspension 500,000 Units QID    ondansetron injection 4 mg Q8H PRN    pantoprazole injection 40 mg Q12H    prochlorperazine injection Soln 5 mg Q6H PRN    QUEtiapine  tablet 25 mg QHS    simethicone chewable tablet 80 mg QID PRN    sodium chloride 0.9% 250 mL flush bag 1 time in Clinic[facility]    sodium chloride 0.9% flush 10 mL PRN    sodium chloride 0.9% flush 10 mL PRN    sodium chloride 0.9% flush 10 mL PRN    sodium chloride 0.9% flush 5 mL PRN     Facility-Administered Medications Ordered in Other Encounters   Medication Frequency    celecoxib capsule 400 mg Once    fentaNYL 50 mcg/mL injection  mcg PRN    LIDOcaine (PF) 10 mg/ml (1%) injection 10 mg Once PRN    LIDOcaine (PF) 10 mg/ml (1%) injection 10 mg Once    midazolam (VERSED) 1 mg/mL injection 0.5-4 mg PRN    ropivacaine 0.2% [facility] PainPRO Pump infusion 500 ML Continuous       Objective:     Vital Signs (Most Recent):  Temp: 98.1 °F (36.7 °C) (11/14/22 1915)  Pulse: 75 (11/14/22 1915)  Resp: 15 (11/14/22 1915)  BP: 138/63 (11/14/22 1915)  SpO2: 97 % (11/14/22 1915)  O2 Device (Oxygen Therapy): room air (11/14/22 1115) Vital Signs (24h Range):  Temp:  [97.3 °F (36.3 °C)-98.7 °F (37.1 °C)] 98.1 °F (36.7 °C)  Pulse:  [67-82] 75  Resp:  [15-20] 15  SpO2:  [93 %-100 %] 97 %  BP: ()/(55-63) 138/63     Weight: 57.6 kg (127 lb) (11/14/22 0958)  Body mass index is 24 kg/m².  Body surface area is 1.57 meters squared.    I/O last 3 completed shifts:  In: 875 [Other:525; NG/GT:200; IV Piggyback:150]  Out: -     Physical Exam  Vitals and nursing note reviewed.   Constitutional:       General: She is awake.      Appearance: She is well-developed. She is ill-appearing. She is not toxic-appearing or diaphoretic.   HENT:      Head: Normocephalic and atraumatic.      Right Ear: External ear normal.      Left Ear: External ear normal.      Nose:      Comments: + NGT     Mouth/Throat:      Mouth: Mucous membranes are dry.   Eyes:      General: Lids are normal. No scleral icterus.        Right eye: No discharge.         Left eye: No discharge.   Cardiovascular:      Rate and Rhythm: Normal rate and regular rhythm.   Pulmonary:       Effort: Pulmonary effort is normal.      Breath sounds: Normal breath sounds. No wheezing or rhonchi.   Abdominal:      General: Bowel sounds are normal.      Palpations: Abdomen is soft.      Tenderness: There is no abdominal tenderness.   Musculoskeletal:         General: No deformity.      Cervical back: Normal range of motion and neck supple. No rigidity or tenderness.      Right lower leg: No edema.      Left lower leg: No edema.   Skin:     General: Skin is warm and dry.   Neurological:      General: No focal deficit present.      Mental Status: She is alert and oriented to person, place, and time. Mental status is at baseline.   Psychiatric:         Attention and Perception: Attention normal.         Behavior: Behavior normal.       Significant Labs:  All labs within the past 24 hours have been reviewed.     Significant Imaging:  Labs: Reviewed

## 2022-11-15 NOTE — PT/OT/SLP PROGRESS
"Physical Therapy Treatment    Patient Name:  Kristin Goodman   MRN:  [id number]    Recommendations:     Discharge Recommendations:  nursing facility, skilled   Discharge Equipment Recommendations: bedside commode   Barriers to discharge: Pt currently requiring increased level of assistance with mobility    Assessment:     Kristin Goodman is a 77 y.o. female admitted with a medical diagnosis of Microscopic polyangiitis.  She presents with the following impairments/functional limitations:  weakness, impaired endurance, impaired functional mobility, gait instability, impaired balance, decreased lower extremity function, pain. Pt demonstrated improved activity tolerance and amb distance of 16' RW CGA-SBA. Pt continues to benefit from skilled PT services while in house in order to address the aforementioned deficits.    Rehab Prognosis: Good; patient would benefit from acute skilled PT services to address these deficits and reach maximum level of function.    Recent Surgery: Procedure(s) (LRB):  EGD (ESOPHAGOGASTRODUODENOSCOPY) (N/A) 1 Day Post-Op    Plan:     During this hospitalization, patient to be seen 3 x/week to address the identified rehab impairments via gait training, therapeutic activities, neuromuscular re-education, therapeutic exercises and progress toward the following goals:    Plan of Care Expires:  11/30/22    Subjective     "I live over there"    Pain/Comfort:  Pain Rating 1:  (not rated)  Location - Orientation 1: generalized  Location 1: abdomen  Pain Addressed 1: Nurse notified, Distraction, Reposition  Pain Rating Post-Intervention 1:  (not rated)      Objective:     Communicated with RN prior to session.  Patient found up in chair with telemetry upon PT entry to room.     General Precautions: Standard, fall   Orthopedic Precautions:N/A   Braces: N/A  Respiratory Status: Room air     Functional Mobility:  Transfers:     Sit to Stand:  modA-maxA with rolling walker  Gait: pt amb 16' RW CGA-SBA with chair " following closely behind. Pt presented with head down, B shortened step, decreased obey, slight hip flexion, no buckling noted.  Balance:   Good sitting balance  Fair standing balance      AM-PAC 6 CLICK MOBILITY  Turning over in bed (including adjusting bedclothes, sheets and blankets)?: 3  Sitting down on and standing up from a chair with arms (e.g., wheelchair, bedside commode, etc.): 2  Moving from lying on back to sitting on the side of the bed?: 3  Moving to and from a bed to a chair (including a wheelchair)?: 3  Need to walk in hospital room?: 3  Climbing 3-5 steps with a railing?: 2  Basic Mobility Total Score: 16       Treatment & Education:  Pt performed 3 reps STS RW modA-maxA from chair with increased difficulty with clearing hips, however once pt is upright pt is CGA-SBA. Pt tolerated static standing at window ~15s prior to being visibly fatigued.     Educated pt on PT role/POC  Educated pt on importance of OOB activity and daily ambulation   Pt educated on proper body mechanics, safety techniques, and energy conservation with PT facilitation and cueing throughout session   Pt verbalized understanding      Patient left up in chair with all lines intact, call button in reach, RN notified, and family present..    GOALS:   Multidisciplinary Problems       Physical Therapy Goals          Problem: Physical Therapy    Goal Priority Disciplines Outcome Goal Variances Interventions   Physical Therapy Goal     PT, PT/OT Ongoing, Progressing     Description: Goals to be met by: 2022     Patient will increase functional independence with mobility by performin. Supine to sit with contact guard assistance  2. Sit to supine with contact guard assistance  3. Sit to stand transfer with minimum assistance MET   3a. STS supvn LRAD  4. Gait  x 40 feet with minimum assistance using LRAD as needed  5. Lower extremity exercise program x10 reps per handout, with independence                        Time  Tracking:     PT Received On: 11/15/22  PT Start Time: 1442     PT Stop Time: 1510  PT Total Time (min): 28 min     Billable Minutes: Gait Training 28    Treatment Type: Treatment  PT/PTA: PT     PTA Visit Number: 2     11/15/2022

## 2022-11-15 NOTE — PLAN OF CARE
Problem: Adult Inpatient Plan of Care  Goal: Plan of Care Review  Outcome: Ongoing, Progressing  Goal: Patient-Specific Goal (Individualized)  Outcome: Ongoing, Progressing     Problem: Glycemic Control Impaired (Sepsis/Septic Shock)  Goal: Blood Glucose Level Within Desired Range  Outcome: Ongoing, Progressing

## 2022-11-15 NOTE — PROGRESS NOTES
[facility] - Intensive Care ([place] [address])  [facility] Medicine  Progress Note    Patient Name: Kristin Goodman  MRN: [id number]  Patient Class: IP- Inpatient   Admission Date: 10/17/2022  Length of Stay: 29 days  Attending Physician: Danielle Palomino MD  Primary Care Provider: Lori Hernandez MD        Subjective:     Principal Problem:Microscopic polyangiitis        HPI:  Kristin Goodman is a 77 y.o. female with a past medical history of HTN, hypothyroidism, HFpEF, and osteoarthritis of the arm who has presented to the ED for cough, SOB, and weakness. Daughter is present at the bedside. Patient presented to the ED on 9/24 with hemoptysis, CT and CXR showed high suspicion for multilobar pneumonia. Patient was discharged with a 10-day course of levofloxacin and an albuterol inhaler. Patient followed up with her PCP on 10/4 with slight improvement in symptoms and went back to work. Patient continued to have worsening symptoms and presented to the ED again on 10/14 for evaluation and no interventions were done. Patient endorses fevers over the last few days up to 102.4 F with progressive SOB. She endorses hemoptysis with moderate amount of blood, generalized weakness, productive cough, SOB, and loss of appetite. Denies chest pain, nausea, vomiting, abdominal pain, or urinary changes.    ED: hypertensive up to 219/93 and tachycardic up to 117. Oxygen saturation on 92% on RA, placed on 5L NC with sats >97%. CBC remarkable for leukocytosis of 16.03 and Hb 7.7. K 3.3. Cr 1.6, baseline ~1.0. . Troponin 0.061. COVID and flu negative. EKG NSR. CT chest with contrast pending at time of admission. Given home amlodipine and lisinopril. Given 500mL NS, IV azithromycin, cefepime and vanc.       Overview/Hospital Course:  Ms. Goodman was admitted to Hospital Medicine for management of multifocal pneumonia and acute hypoxemic respiratory failure after presenting to the ED with fevers, cough, hemoptysis, and dyspnea. She required  escalation to the Medical ICU due to abrupt decline in her respiratory status, associated with hemoptysis and requiring NIPPV. CT chest showed bilateral patchy ground glass opacities. Labs additionally were notable for acute renal failure on CKD3. Pulmonology was consulted while under the care of [facility] and felt this picture was consistent with diffuse alveolar hemorrhage.     Her workup revealed a strongly positive MPO Ab consistent with clinical picture of microscopic polyangiitis with pulmonary and renal involvement. She underwent Trialysis catheter placement 10/25/2022 in the Medical ICU. 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     Rheumatology was consulted, extensive workup revealed MITUL + 1:2560 homogenous, +dsDNA, normal complements, PR3 1.2 (slight +),  (+), cANCA + 1:80, pANCA neg, GBMAb neg, trace cryos. Due to concern for MPA, she was started on pulse dose IV methylprednisolone 1000mg for 3 days, and plasmapheresis under the guidance of Transfusion Medicine. She remains on a steroid taper and has completed PLEX. She additionally received rituximab and cyclophosphamide. OI prophylaxis was provided with atovaquone due to a sulfa allergy.     Nephrology was consulted for evaluation of acute renal failure in the setting of MPA. She did require SLED in the ICU for renal clearance and remains on intermittent hemodialysis. She is expected to continue HD once discharged.     Her acute hypoxemic respiratory failure improved and she was weaned off of supplemental oxygen. She stepped down to [facility] 10/28.     She underwent MBBS for evaluation of dysphagia, which showed global delayed initiation of swallow and aspiration with thin liquids. Due to  concern for possible prior stroke, head imaging was completed and negative for acute finding. She is NPO with NG tube. ENT was consulted for evaluation of dysphagia and cervical adenopathy.  Patient did not tolerate laryngoscopy; unable to visualize vocal cords but given her lack of hoarseness, they did not suspect vocal fold paresis/paralysis. MRI recommended by rheum to fully rule out any potential neurological cause of the patient's dysphagia; but demonstrated   remote left thalamic lacunar type infarct and punctate remote left cerebellar infarcts.  She is continuing to work with speech therapy.  EGD completed 11/14 without abnormalities.  Per GI, Suspect some aspect of esophageal dysmotility in setting of critical illness. No further testing at this time.    She is due rituximab and cyclophosphamide on 11/10.     Her other chronic medical conditions including hypothyroidism, diastolic CHF, and hypertension were managed with her home medications, with dose adjustments as needed.         Interval History:   Patient seen and examined at bedside.  No acute events overnight.  However, notes some epigastric burning, nausea this morning.  Notes no left, lateral neck pain or odynophagia.      Review of Systems   Constitutional:  Positive for fatigue. Negative for chills, diaphoresis and fever.   HENT:  Positive for trouble swallowing. Negative for congestion and sore throat.    Eyes:  Negative for visual disturbance.   Respiratory:  Negative for cough, shortness of breath and wheezing.    Cardiovascular:  Negative for chest pain, palpitations and leg swelling.   Gastrointestinal:  Negative for abdominal pain, nausea and vomiting.   Genitourinary:  Positive for decreased urine volume.   Musculoskeletal:  Negative for arthralgias and myalgias.   Skin:  Negative for rash.   Neurological:  Positive for weakness. Negative for dizziness, light-headedness and headaches.   Psychiatric/Behavioral:  Negative for confusion,  decreased concentration and sleep disturbance.      Objective:     Vital Signs (Most Recent):  Temp: 98.8 °F (37.1 °C) (11/15/22 0750)  Pulse: 79 (11/15/22 1127)  Resp: 14 (11/15/22 0750)  BP: (!) 108/56 (11/15/22 0750)  SpO2: 99 % (11/15/22 0750)   Vital Signs (24h Range):  Temp:  [97.4 °F (36.3 °C)-99.5 °F (37.5 °C)] 98.8 °F (37.1 °C)  Pulse:  [64-79] 79  Resp:  [14-19] 14  SpO2:  [93 %-100 %] 99 %  BP: (108-138)/(56-63) 108/56     Weight: 57.6 kg (127 lb)  Body mass index is 24 kg/m².    Intake/Output Summary (Last 24 hours) at 11/15/2022 1211  Last data filed at 11/15/2022 1042  Gross per 24 hour   Intake 1988 ml   Output --   Net 1988 ml        Physical Exam  Vitals and nursing note reviewed.   Constitutional:       General: She is awake.      Appearance: She is well-developed. She is ill-appearing. She is not toxic-appearing or diaphoretic.   HENT:      Head: Normocephalic and atraumatic.      Right Ear: External ear normal.      Left Ear: External ear normal.      Nose:      Comments: + NGT     Mouth/Throat:      Mouth: Mucous membranes are dry.   Eyes:      General: Lids are normal. No scleral icterus.        Right eye: No discharge.         Left eye: No discharge.   Cardiovascular:      Rate and Rhythm: Normal rate and regular rhythm.   Pulmonary:      Effort: Pulmonary effort is normal.      Breath sounds: Normal breath sounds. No wheezing or rhonchi.   Abdominal:      General: Bowel sounds are normal.      Palpations: Abdomen is soft.      Tenderness: There is no abdominal tenderness.   Musculoskeletal:         General: No deformity.      Cervical back: Normal range of motion and neck supple. No rigidity or tenderness.      Right lower leg: No edema.      Left lower leg: No edema.   Skin:     General: Skin is warm and dry.   Neurological:      General: No focal deficit present.      Mental Status: She is alert and oriented to person, place, and time. Mental status is at baseline.   Psychiatric:          Attention and Perception: Attention normal.         Behavior: Behavior normal.       Significant Labs: All pertinent labs within the past 24 hours have been reviewed.    Recent Results (from the past 24 hour(s))   POCT glucose    Collection Time: 11/14/22 12:16 PM   Result Value Ref Range    POCT Glucose 129 (H) 70 - 110 mg/dL   POCT glucose    Collection Time: 11/14/22  4:34 PM   Result Value Ref Range    POCT Glucose 157 (H) 70 - 110 mg/dL   POCT glucose    Collection Time: 11/14/22 11:52 PM   Result Value Ref Range    POCT Glucose 98 70 - 110 mg/dL   Magnesium    Collection Time: 11/15/22  4:46 AM   Result Value Ref Range    Magnesium 1.9 1.6 - 2.6 mg/dL   Phosphorus    Collection Time: 11/15/22  4:46 AM   Result Value Ref Range    Phosphorus 6.3 (H) 2.7 - 4.5 mg/dL   CBC auto differential    Collection Time: 11/15/22  4:46 AM   Result Value Ref Range    WBC 8.10 3.90 - 12.70 K/uL    RBC 2.47 (L) 4.00 - 5.40 M/uL    Hemoglobin 7.1 (L) 12.0 - 16.0 g/dL    Hematocrit 22.4 (L) 37.0 - 48.5 %    MCV 91 82 - 98 fL    MCH 28.7 27.0 - 31.0 pg    MCHC 31.7 (L) 32.0 - 36.0 g/dL    RDW 15.5 (H) 11.5 - 14.5 %    Platelets 201 150 - 450 K/uL    MPV 11.3 9.2 - 12.9 fL    Immature Granulocytes 0.5 0.0 - 0.5 %    Gran # (ANC) 6.8 1.8 - 7.7 K/uL    Immature Grans (Abs) 0.04 0.00 - 0.04 K/uL    Lymph # 0.9 (L) 1.0 - 4.8 K/uL    Mono # 0.4 0.3 - 1.0 K/uL    Eos # 0.0 0.0 - 0.5 K/uL    Baso # 0.01 0.00 - 0.20 K/uL    nRBC 0 0 /100 WBC    Gran % 83.5 (H) 38.0 - 73.0 %    Lymph % 10.5 (L) 18.0 - 48.0 %    Mono % 5.3 4.0 - 15.0 %    Eosinophil % 0.1 0.0 - 8.0 %    Basophil % 0.1 0.0 - 1.9 %    Differential Method Automated    Basic metabolic panel    Collection Time: 11/15/22  4:47 AM   Result Value Ref Range    Sodium 131 (L) 136 - 145 mmol/L    Potassium 4.1 3.5 - 5.1 mmol/L    Chloride 97 95 - 110 mmol/L    CO2 26 23 - 29 mmol/L    Glucose 102 70 - 110 mg/dL    BUN 84 (H) 8 - 23 mg/dL    Creatinine 6.5 (H) 0.5 - 1.4 mg/dL    Calcium  7.7 (L) 8.7 - 10.5 mg/dL    Anion Gap 8 8 - 16 mmol/L    eGFR 6.2 (A) >60 mL/min/1.73 m^2   Hepatitis B surface antigen    Collection Time: 11/15/22  4:47 AM   Result Value Ref Range    Hepatitis B Surface Ag Non-reactive Non-reactive   Hepatitis B surface antibody    Collection Time: 11/15/22  4:47 AM   Result Value Ref Range    Hep B S Ab 308.24 mIU/mL    Hep B S Ab Reactive    Hepatitis B core antibody, total    Collection Time: 11/15/22  4:47 AM   Result Value Ref Range    Hep B Core Total Ab Non-reactive Non-reactive   Hepatitis A antibody, IgM    Collection Time: 11/15/22  4:47 AM   Result Value Ref Range    Hep A IgM Non-reactive Non-reactive   POCT glucose    Collection Time: 11/15/22  5:10 AM   Result Value Ref Range    POCT Glucose 112 (H) 70 - 110 mg/dL         Significant Imaging: I have reviewed all pertinent imaging results/findings within the past 24 hours.    Imaging Results               CT Chest With Contrast (Final result)  Result time 10/17/22 14:26:36      Final result by Lacy Camp MD (10/17/22 14:26:36)                   Impression:      Interval progression of bilateral perihilar dense consolidation with peripheral patchy areas of ground-glass opacification nonspecific as to the etiology.  Bilateral pneumonia as well as inflammatory etiologies considered.  Cardiogenic pulmonary edema considered less likely given the pattern.    Borderline sized mediastinal lymph node appears stable.    No other interval detrimental changes since 10/14/2022.    This report was flagged in Epic as abnormal.      Electronically signed by: Lacy Camp  Date:    10/17/2022  Time:    14:26               Narrative:    EXAMINATION:  CT CHEST WITH CONTRAST    CLINICAL HISTORY:  Aspiration;    TECHNIQUE:  Low dose axial images, sagittal and coronal reformations were obtained from the thoracic inlet to the lung bases following the IV administration of 75 mL of Omnipaque 350.    COMPARISON:  CT chest without  contrast 10/14/2022, CTA chest 09/24/2022    FINDINGS:  Base of Neck: No significant abnormality.    Thoracic soft tissues: Unremarkable.    Aorta: Left-sided aortic arch.  No aneurysm and no significant atherosclerosis.    Heart: Normal in size.  There is no convincing pericardial effusion with prior trace effusion no longer apparent.    Pulmonary vasculature: Pulmonary arteries distribute normally with no visible significant central pulmonary thromboemboli.  Pulmonary artery caliber is normal.    Essence/Mediastinum: 9 mm in short axis lymph node in the AP window again noted.  No convincing new adenopathy.  Axillary shotty lymph nodes with fatty essence appear stable.    Airways: Patent.    Lungs/Pleura: Perihilar alveolar opacities with ground-glass component appear more prominent bilaterally as compared to the previous examination 10/14/2022 with dense consolidation centered in the perihilar region.  More patchy ground-glass opacities extend to the periphery.  There is no acute pleural effusion.    Esophagus: Unremarkable.    Upper Abdomen: Multiple low densities are again noted in the liver left lobe too small to characterize but likely cysts largest measuring 8 mm axial image 93 series 2.  There is no new abnormality of the partially imaged upper abdomen.    Bones: There is no acute fracture or suspicious lytic or sclerotic lesion involving the imaged chest wall osseous structures.  There is spondylosis and kyphosis of the imaged spine.  No subluxation.                                          Assessment/Plan:      * Microscopic polyangiitis  As of 10/26/22 strongly positive MPO Ab (in contrast to borderline positive PR3), consistent with clinical picture of microscopic polyangiitis with pulmonary, and renal involvement after presenting with acute hypoxemic respiratory failure, hemoptysis, and acute renal failure.  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   3)  CHANGES SUGGESTIVE OF FOCAL ACUTE PYELONEPHRITIS.   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 Now following PEXIVAS steroid taper. Currently on IV Solumedrol 20 mg daily.   - plan for 20mg IV daily on 11/6 for 14 days (last dose of 20mg equivalent is 11/20/2022).   - apheresis: underwent PLEX 10/26, 10/27, 10/30, 11/1, 11/2, 11/3  - rituximab every 7 days for 4 doses: Dose #1: 10/27, #2 11/3, #3 11/10; Next Rituximab 375 mg/m^2 due Nov 17th   - cyclophosphamide every 14 days for 2 doses: 10/27, 11/10  - Opportunistic Infection ppx: atovaquone 1500mg PO daily  - GI bleed prophylaxis: pantoprazole 40mg daily     Gastric reflux  PPI BID  Elevated HOB; feeds changed to bolus and tolerating well  Symptoms initially improved however reoccurrence on 11/13 without significant dietary changes; GI planning for EGD 11/14  TF further adjusted for smaller, more frequent bolus   s/p EGD 11/14; Normal Study      Gastrointestinal hemorrhage with melena  Starting having hemoptysis and melena with associated acute blood loss anemia earlier in hospital stay. Patient with known internal hemorrhoids, mild sigmoid diverticulosis, history of gastritis and + H pylori (2012  EGD).   - GI consulted 10/19, unable to perform EGD due to instability and respiratory failure at the time    Plan  - patient unable to take PO PPI due to NGT removal on 11/5, transition to IV PPI 40mg pantoprazole daily, return to per NG tube once replaced  - s/p EGD 11/14; Normal Study  - PPI transitioned to BID as concern for GERD  - Monitor CBC closely for signs of recurrent bleed    Dysphagia  SLP following  MBSS showing global weakness in swallowing as well as aspiration with thin liquids.   ENT consulted but patient did not tolerate laryngoscopy     Plan   - Discussed case with Rheumatology team, they are concerned that this dysphagia may have been from prior stroke, requesting imaging for evaluation-  CT demonstrated no acute findings or causes for dysphagia NOR did MRI   - removed NGT and place dobhoff which is more comfortable and makes swallowing easier compared to NGT.    - continue working with speech therapy   -exam concerning for thrush; nystatin swish and swallow initiated; GI consulted as concern that oropharyngeal candidiasis may be contributing to dysphagia  -Per GI, Lower suspicion for esophageal candidiasis without odynophagia however can If white plaques have been seen on oropharynx, ok to start fluconazole empirically for possible esophageal candidiasis  -EGD on 11/14 without abnormality; per GI, Suspect some aspect of esophageal dysmotility in setting of critical illness. No further testing at this time.    Moderate malnutrition  Nutrition consulted. Most recent weight and BMI monitored-          Malnutrition (Moderate to Severe)  Weight Loss (Malnutrition): 7.5% in 3 months              Measurements:  Wt Readings from Last 1 Encounters:   11/09/22 63 kg (138 lb 14.2 oz)   Body mass index is 26.24 kg/m².    Recommendations: Recommendation/Intervention: 1. As tolerated, increase TF rate (of Novasource) to 35 mL/hr  - 2. RD to monitor & follow-up.  Goals: Meet % EEN, EPN by RD f/u  date    Patient has been screened and assessed by RD. RD will follow patient.      Acute renal failure with tubular necrosis  Due to microscopic polyangiitis. Remains on hemodialysis intermittently.   - Baseline creatinine 1.0-1.2.   - New onset proteinuria/hematuria.   - Renal biopsy completed and   - Trialysis in place for intermittent Hemodialysis    Plan  - Nephrology consulted, appreciate management  - management of MPA as noted separately  - Renal function panel daily  - renally dose all medications  - intermittent Hemodialysis as indicated, per Nephrology     Acute hypoxemic respiratory failure  Multifocal pneumonia  Hemoptysis  Dyspnea  Diffuse Alveolar Hemorrahge  In the setting of a new diagnosis of microscopic polyangiitis, presented with fevers, dyspnea,.  - Chest imaging was consistent with diffuse alveolar hemorrhage.  CT chest wc 10/17 with bl perihilar dense consolidations w/ peripheral patcy areas of GGO, BL PNA, less c/f cardiogenic pulmonary edema.  - Patient upgraded to Medical ICU 10/23/22 with abrupt respiratory decline requiring NIPPV, improved with high dose IV steroids, plasmapheresis, emergent hemodialysis.   - Unable to perform bronchoscopy in the Medical ICU due to tenuous respiratory status  - As of 11/6, hypoxemia has significantly improved    Plan:  - weaned to room air  - continue management of underlying triggers with steroid and immunosuppressants  - incentive spirometry and respiratory hygiene    Lymphadenopathy of head and neck  - Has bilateral neck gland swelling with tenderness,consulted ENT  - No surgical intervention indicated   - Adenopathy likely reactive in nature   - Recommend further workup if it does not resolve within next 2 weeks     Acute blood loss anemia  In the setting of GI bleed with melena  - Evaluated by GI, see GI bleed documentation  - Last transfusion 10/28, Hgb slowly trending down since then  - Hgb 6.9 on 11/5      Plan  - Monitor CBC Daily   - Treat with  pRBC transfusion PRN Hgb <7  - qualifies for transfusion on 11/5 with Hgb 6.9, 1u pRBC ordered  -stable       Chronic diastolic heart failure  Pulmonary Hypertension due to left heart disease  TTE (10/2022) EF 65%, G1DD  Home meds: Lisinopril, Toprol     No signs or symptoms to suggest acute exacerbation    Plan  - Active volume control with intermittent Hemodialysis per Nephrology   - Daily weights (standing if tolerated)  - Strict I/Os  - Fluid restriction 1.5 day  - Cardiac diet w/ 2 g Na restriction      Primary hypertension  BP Readings from Last 1 Encounters:   11/13/22 130/60     - Patient is unable to tolerate PO mediations, all meds to be administered via NG tube  -Will continue to monitor blood pressure trend closely and adjust antihypertensive regimen as clinically indicated and tolerated.    amLODIPine tablet 10 mg, 10 mg, Per NG tube, Daily    carvediloL tablet 12.5 mg, 12.5 mg, Per NG tube, BID    hydrALAZINE tablet 25 mg, 25 mg, Per NG tube, Q8H    lisinopriL tablet 40 mg, 40 mg, Per NG tube, Daily      Hypothyroid  - Continue synthroid 25 mcg  - TSH 0.585 10/27/22    VTE Risk Mitigation (From admission, onward)         Ordered     heparin, porcine (PF) 100 unit/mL injection flush 500 Units  As needed (PRN)         11/10/22 1430     heparin (porcine) injection 1,000 Units  As needed (PRN)         11/09/22 1601     heparin (porcine) injection 1,000 Units  As needed (PRN)         11/08/22 0854     Place sequential compression device  Until discontinued         10/25/22 1731     IP VTE HIGH RISK PATIENT  Once         10/17/22 1354     Reason for No Pharmacological VTE Prophylaxis  Once        Question:  Reasons:  Answer:  Risk of Bleeding    10/17/22 1354                Discharge Planning   ANTHONY: 11/17/2022     Code Status: Full Code   Is the patient medically ready for discharge?: No    Reason for patient still in hospital (select all that apply): Patient trending condition, Treatment, Consult  recommendations and Pending disposition  Discharge Plan A: Skilled Nursing Facility   Discharge Delays: None known at this time              Danielle Palomino MD  Department of Hospital Medicine   [facility] - Intensive Care ([place])

## 2022-11-15 NOTE — PROGRESS NOTES
"[facility] ([place] [address])  Rheumatology  Progress Note    Patient Name: Kristin Goodman  MRN: [id number]  Admission Date: 10/17/2022  Hospital Length of Stay: 29 days  Code Status: Full Code   Attending Provider: Danielle Palomino MD  Primary Care Physician: Lori Hernandez MD  Principal Problem: Microscopic polyangiitis    Subjective:     HPI: Patient is a 78 yo F with chronic diastolic heart failure, HTN, OA, who is admitted for respiratory failure. Rheumatology is consulted due to concern for vasculitis. Patient initially presented to the ED on 9/24/22 complaining of a week of cough followed by an episode of hemoptysis on 9/24 prompting the ED visit. They did a CTA which showed multifocal PNA. She was sent home with [facility]. She followed up with PCP on 10/4/22 and was feeling better. Then she presented to the ED again on 10/14 and on 10/17 complaining of dyspnea. She had fevers in the few days leading up to admission of 102. She endorsed weakness, productive cough, dyspnea, and loss of appetite. Pt initally at 88% O2 saturation and improved to 98% on 2L NC. She was admitted for IV antibiotics. CT chest with contrast on 10/17 showed evidence of interval progression of bilateral perihilar dense consolidation with peripheral patchy areas of ground-glass opacification nonspecific as to the etiology.  Bilateral pneumonia as well as inflammatory etiologies considered.  Cardiogenic pulmonary edema considered less likely given the pattern.    On 10/19/22 she started having melena. Hb dropped to 6. She got 2 units pRBCs. GI was consulted. They noted "Colonoscopy in 2020 showed internal hemorrhoids and mild sigmoid diverticulosis. EGD in 2012 showed gastritis, biopsies positive for H. Pylori." "We considered doing EGD now, but from a pulmonary standpoint I do not think she is stable enough with the current pneumonia, therefore would recommend that we just follow the patient, treat with a PPI in case there is " "any peptic ulcer disease."    On 10/21/22 ENT was consulted due to left sided otalgia the day prior which resolved. She also was complaining of cervical adenopathy and sore throat. They did not recommend surgical intervention and felt that adenopathy was likely reactive.     On 10/23/22 ID was consulted. They recommended checking respiratory infection panel and fungal markers.    On 10/23/22 Nephrology was consulted due to worsening creatinine. They noted, "Patient is a noted to have baseline creatinine of 1 as recent as 8/2022 but progressive worsening of creatinine in early October.  On admission patient with creatinine of 1.6 (10/17) with subsequent progression and creatinine of 3.0 at time of consultation (10/23).  Nephrology consulted for acute kidney injury." "Patient with ATN nephritic picture in urine sediment, prot/crea ratio pending. Had hematuria in recent past but in context with positive urine cultures. Now definitely more dysmorphic red cells that wbcs in the urine. 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    On 10/23/22, she was transferred to ICU due to desaturations and respiratory distress. Pulmonologist noted that her respiratory status is too tenuous for bronchoscopy. She was stabilized with non-invasive ventilation and nasal cannula oxygen.      Interval History: No acute events overnight. Patient s/p negative GI endoscopy for indigestion. This mourning she reported abd discomfort that has now cleared after administration of IV PPI. Diet is progressing.     Current Facility-Administered Medications   Medication Frequency    0.9%  NaCl infusion (for blood administration) Q24H PRN    0.9%  NaCl infusion (for blood administration) Q24H PRN    0.9%  NaCl infusion (for blood administration) Q24H PRN    0.9%  NaCl infusion Once    acetaminophen tablet 650 mg Q4H PRN    " albuterol-ipratropium 2.5 mg-0.5 mg/3 mL nebulizer solution 3 mL Q4H PRN    aluminum-magnesium hydroxide-simethicone 200-200-20 mg/5 mL suspension 30 mL QID PRN    amLODIPine tablet 10 mg Daily    atovaquone 750 mg/5 mL oral liquid 1,500 mg Daily    bisacodyL suppository 10 mg Daily PRN    carvediloL tablet 25 mg BID    cloNIDine tablet 0.1 mg Q6H PRN    dextrose 10% bolus 125 mL PRN    dextrose 10% bolus 250 mL PRN    glucagon (human recombinant) injection 1 mg PRN    glucose chewable tablet 16 g PRN    glucose chewable tablet 24 g PRN    heparin (porcine) injection 1,000 Units PRN    heparin, porcine (PF) 100 unit/mL injection flush 500 Units PRN    hydrALAZINE tablet 100 mg Q8H    insulin aspart U-100 pen 1-10 Units Q6H PRN    levothyroxine tablet 25 mcg Before breakfast    lisinopriL tablet 40 mg Daily    melatonin tablet 6 mg Nightly PRN    methocarbamoL tablet 500 mg TID PRN    methylPREDNISolone sodium succinate injection 20 mg Daily    naloxone 0.4 mg/mL injection 0.02 mg PRN    nystatin 100,000 unit/mL suspension 500,000 Units QID    ondansetron injection 4 mg Q8H PRN    pantoprazole injection 40 mg Q12H    prochlorperazine injection Soln 5 mg Q6H PRN    QUEtiapine tablet 25 mg QHS    simethicone chewable tablet 80 mg QID PRN    sodium chloride 0.9% 250 mL flush bag 1 time in Clinic[facility]    sodium chloride 0.9% flush 10 mL PRN    sodium chloride 0.9% flush 10 mL PRN    sodium chloride 0.9% flush 10 mL PRN    sodium chloride 0.9% flush 5 mL PRN     Facility-Administered Medications Ordered in Other Encounters   Medication Frequency    celecoxib capsule 400 mg Once    fentaNYL 50 mcg/mL injection  mcg PRN    LIDOcaine (PF) 10 mg/ml (1%) injection 10 mg Once PRN    LIDOcaine (PF) 10 mg/ml (1%) injection 10 mg Once    midazolam (VERSED) 1 mg/mL injection 0.5-4 mg PRN    ropivacaine 0.2% [facility] PainPRO Pump infusion 500 ML Continuous     Objective:     Vital Signs (Most  Recent):  Temp: 97.6 °F (36.4 °C) (11/14/22 1156)  Pulse: 68 (11/14/22 1156)  Resp: 20 (11/14/22 1156)  BP: 122/60 (11/14/22 1156)  SpO2: 97 % (11/14/22 1156)  O2 Device (Oxygen Therapy): room air (11/14/22 1115) Vital Signs (24h Range):  Temp:  [97.3 °F (36.3 °C)-98.9 °F (37.2 °C)] 97.6 °F (36.4 °C)  Pulse:  [67-84] 68  Resp:  [14-23] 20  SpO2:  [93 %-100 %] 97 %  BP: ()/(55-64) 122/60     Weight: 57.6 kg (127 lb) (11/14/22 0958)  Body mass index is 24 kg/m².  Body surface area is 1.57 meters squared.      Intake/Output Summary (Last 24 hours) at 11/14/2022 1339  Last data filed at 11/14/2022 1014  Gross per 24 hour   Intake 350 ml   Output --   Net 350 ml       Physical Exam   Constitutional: She is oriented to person, place, and time. normal appearance.  Non-toxic appearance. No distress. She does not appear ill.   HENT:   Head: Normocephalic and atraumatic.   Nose: Nose normal. No rhinorrhea or nasal congestion.   Mouth/Throat: Mucous membranes are moist. No oropharyngeal exudate or posterior oropharyngeal erythema.   Eyes: Pupils are equal, round, and reactive to light. Conjunctivae are normal. Right eye exhibits no discharge. Left eye exhibits no discharge. No scleral icterus.   Cardiovascular: Normal rate, regular rhythm, normal heart sounds and normal pulses. Exam reveals no gallop and no friction rub.   No murmur heard.  Pulmonary/Chest: Effort normal. No stridor. No respiratory distress. She has no wheezes. She has no rhonchi. She has no rales. She exhibits no tenderness.   Abdominal: Bowel sounds are normal. She exhibits no distension and no mass. There is no abdominal tenderness. There is no rebound and no guarding. No hernia.   Musculoskeletal:         General: No swelling, tenderness, deformity or signs of injury.   Neurological: She is alert and oriented to person, place, and time.   Skin: Skin is warm and dry. No bruising, no lesion and no rash noted. She is not diaphoretic. No erythema. No  jaundice or pallor.   Vitals reviewed.    Significant Labs:  All pertinent lab results from the last 24 hours have been reviewed.    Significant Imaging:  Imaging results within the past 24 hours have been reviewed.    Assessment/Plan:     Acute hypoxemic respiratory failure  Patient is a 78 yo F with chronic diastolic heart failure, HTN, OA, who is admitted for respiratory failure. Rheumatology is consulted due to concern for vasculitis. Patient presented with cough and hemoptysis since 9/24/22. She was admitted due to dyspnea and hypoxia on 10/17. She has had Hb drop to 6 this admission requiring blood transfusions. Reviewed her chest imaging which shows progressive disease from 10/14 until now which can be concerning for DAH. This might also explain the Hb drop. No bronch planned for now due to her tenuous respiratory status. S/p upper endoscopy with GI 11/14. She has had increasing creatinine from baseline of 1 to 3.0 on 10/23/22.     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   3)  CHANGES SUGGESTIVE OF FOCAL ACUTE PYELONEPHRITIS.   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     Treatment to date  - s/p IV Solumedrol 1000 mg x3 doses. Now following PEXIVAS steroid taper. Currently on IV Solumedrol 20 mg daily.  - PLEX 10/26, 10/27, 10/29, 10/30, 11/1, 11/2, 11/3 (prior to Rituxan)  - Further HD per nephrology  - Rituximab 375 mg/m^2 (600 mg) on 10/27 (every 7 day dose 1 of 4), 11/3 (dose 2 of 4), 11/10 (dose 3 of  4)  - Cyclophosphamide 750 mg/m2 on 10/27 (every 14 day dose 1 of 2), 11/10 (dose 2 of 2)    Recommendations:  1. Continue steroid taper per PEXIVAS trial as in the chart below.Continue start Solu-Medrol 20 mg IV started on 11/6 for total of 14 days  2. Atovaquone 1500 mg and Protonix daily while on high dose steroids.  3. Next Rituximab 375 mg/m^2 due 11/17  4. S/p Cyclophosphamide 10/27 and 11/10  5. Monitor CBC and CMP in 10-14 days after giving chemotherapy                    Francisco J Ricci MD  Rheumatology  [facility] ([place]

## 2022-11-15 NOTE — PROGRESS NOTES
I have personally taken the history and examined the patient and concur with the resident's note as above.  She has a combination of MPA and lupus.  She tolerated the therapy.  She is due for her next dose of rituximab this week.  We will start steroid taper next week.  She is slowly regaining her strength.  She has no specific complaint at this time.

## 2022-11-15 NOTE — PT/OT/SLP PROGRESS
Speech Language Pathology Treatment    Patient Name:  Kristin Goodman   MRN:  [id number]  Admitting Diagnosis: Microscopic polyangiitis    Recommendations:                 General Recommendations:  Dysphagia therapy  Diet recommendations:  Mechanical soft, Liquid Diet Level: Thin   Aspiration Precautions: 1 bite/sip at a time, Avoid talking while eating, Eliminate distractions, Feed only when awake/alert, Frequent oral care, HOB to 90 degrees, No straws, Small bites/sips, and Strict aspiration precautions   General Precautions: Standard, aspiration, fall  Communication strategies:  none    Subjective     Pt awake/alert.    Pain/Comfort:  Pain Rating 1: 0/10    Respiratory Status: Room air    Objective:     Has the patient been evaluated by SLP for swallowing?   Yes  Keep patient NPO? No   Pt endorsed nausea and abdominal discomfort and declined PO trials on first attempt. (8:58)  On second second attempt, pt found resting in bed with sister at bedside. Pt stated nausea symptoms decreased enough to attempt PO trials. Pt seen with thin liquid (x3 tsp, x4 cup sips), jello x3 tsp and soft solid (bread) x2 bites. Minimal oral holding, adequate bolus control and mildly delayed swallow initiation observed initially however, progressed to timely swallow. No overt s/sx of airway compromise observed across all consistencies. SLP provided education on aspiration safety/precautions. Pt and sister verbalized understanding. Recommend mechanical soft diet and thin liquids at this time.       Assessment:     Kristin Goodman is a 77 y.o. female with an SLP diagnosis of Dysphagia.      Goals:   Multidisciplinary Problems       SLP Goals          Problem: SLP    Goal Priority Disciplines Outcome   SLP Goal     SLP Ongoing, Progressing   Description: Speech Language Pathology Goals  Goals expected to be met by 11/14/22    1. Pt will participate in ongoing assessment of swallow function to determine safest, least restrictive means of  nutrition/hydration  2. Educate Pt and family on aspiration precautions and SLP POC  3. Pt will tolerate trials of nectar-thickened liquids w/o overt S/S aspiration, MIN A  4. Pt will complete dysphagia exercises to improve timing of initiation of swallow x5 with 90% accuracy, MIN A                         Plan:     Patient to be seen:  4 x/week   Plan of Care expires:  11/29/22  Plan of Care reviewed with:  patient, family   SLP Follow-Up:  Yes       Discharge recommendations:  nursing facility, skilled   Barriers to Discharge:  None    Time Tracking:     SLP Treatment Date:   11/15/22  Speech Start Time:  1104  Speech Stop Time:  1128     Speech Total Time (min):  24 min    Billable Minutes: Treatment Swallowing Dysfunction 16 and Self Care/Home Management Training 8    11/15/2022

## 2022-11-15 NOTE — SUBJECTIVE & OBJECTIVE
Interval History: continues to have unrecorded voids, serologies worsening. No uremic signs      Review of patient's allergies indicates:   Allergen Reactions    Ampicillin     Peaches [peach (prunus persica)] Other (See Comments)     Pt unable to state type of reaction. Information obtained from daughter who states she was informed of allergy from patient.    Penicillins      Other reaction(s): Hives    Sulfa (sulfonamide antibiotics) Rash and Hives     Current Facility-Administered Medications   Medication Frequency    0.9%  NaCl infusion (for blood administration) Q24H PRN    0.9%  NaCl infusion Once    acetaminophen tablet 650 mg Q4H PRN    albuterol-ipratropium 2.5 mg-0.5 mg/3 mL nebulizer solution 3 mL Q4H PRN    aluminum-magnesium hydroxide-simethicone 200-200-20 mg/5 mL suspension 30 mL QID PRN    aluminum-magnesium hydroxide-simethicone 200-200-20 mg/5 mL suspension 30 mL Once    And    LIDOcaine HCl 2% oral solution 15 mL Once    amLODIPine tablet 10 mg Daily    atovaquone 750 mg/5 mL oral liquid 1,500 mg Daily    bisacodyL suppository 10 mg Daily PRN    carvediloL tablet 25 mg BID    cloNIDine tablet 0.1 mg Q6H PRN    dextrose 10% bolus 125 mL PRN    dextrose 10% bolus 250 mL PRN    glucagon (human recombinant) injection 1 mg PRN    glucose chewable tablet 16 g PRN    glucose chewable tablet 24 g PRN    heparin (porcine) injection 1,000 Units PRN    heparin, porcine (PF) 100 unit/mL injection flush 500 Units PRN    hydrALAZINE tablet 100 mg Q8H    insulin aspart U-100 pen 1-10 Units Q6H PRN    levothyroxine tablet 25 mcg Before breakfast    lisinopriL tablet 40 mg Daily    melatonin tablet 6 mg Nightly PRN    methocarbamoL tablet 500 mg TID PRN    methylPREDNISolone sodium succinate injection 20 mg Daily    naloxone 0.4 mg/mL injection 0.02 mg PRN    nystatin 100,000 unit/mL suspension 500,000 Units QID    ondansetron injection 4 mg Q8H PRN    pantoprazole injection 40 mg Q12H    prochlorperazine  injection Soln 5 mg Q6H PRN    QUEtiapine tablet 25 mg QHS    simethicone chewable tablet 80 mg QID PRN    sodium chloride 0.9% 250 mL flush bag 1 time in Clinic/[facility]    sodium chloride 0.9% flush 10 mL PRN    sodium chloride 0.9% flush 10 mL PRN    sodium chloride 0.9% flush 10 mL PRN    sodium chloride 0.9% flush 5 mL PRN     Facility-Administered Medications Ordered in Other Encounters   Medication Frequency    celecoxib capsule 400 mg Once    fentaNYL 50 mcg/mL injection  mcg PRN    LIDOcaine (PF) 10 mg/ml (1%) injection 10 mg Once PRN    LIDOcaine (PF) 10 mg/ml (1%) injection 10 mg Once    midazolam (VERSED) 1 mg/mL injection 0.5-4 mg PRN    ropivacaine 0.2% [facility] PainPRO Pump infusion 500 ML Continuous       Objective:     Vital Signs (Most Recent):  Temp: 98.3 °F (36.8 °C) (11/15/22 1211)  Pulse: 72 (11/15/22 1211)  Resp: 18 (11/15/22 1211)  BP: 132/60 (11/15/22 1211)  SpO2: (!) 93 % (11/15/22 1211)  O2 Device (Oxygen Therapy): room air (11/15/22 0033)   Vital Signs (24h Range):  Temp:  [97.4 °F (36.3 °C)-99.5 °F (37.5 °C)] 98.3 °F (36.8 °C)  Pulse:  [64-79] 72  Resp:  [14-19] 18  SpO2:  [93 %-100 %] 93 %  BP: (108-138)/(56-63) 132/60     Weight: 57.6 kg (127 lb) (11/14/22 0958)  Body mass index is 24 kg/m².  Body surface area is 1.57 meters squared.    I/O last 3 completed shifts:  In: 1938 [P.O.:120; Other:525; NG/GT:1143; IV Piggyback:150]  Out: -     Physical Exam  Vitals and nursing note reviewed.   Constitutional:       General: She is awake.      Appearance: She is well-developed. She is ill-appearing. She is not toxic-appearing or diaphoretic.   HENT:      Head: Normocephalic and atraumatic.      Right Ear: External ear normal.      Left Ear: External ear normal.      Nose:      Comments: + NGT     Mouth/Throat:      Mouth: Mucous membranes are dry.   Eyes:      General: Lids are normal. No scleral icterus.        Right eye: No discharge.         Left eye: No discharge.   Cardiovascular:       Rate and Rhythm: Normal rate and regular rhythm.   Pulmonary:      Effort: Pulmonary effort is normal.      Breath sounds: Normal breath sounds. No wheezing or rhonchi.   Abdominal:      General: Bowel sounds are normal.      Palpations: Abdomen is soft.      Tenderness: There is no abdominal tenderness.   Musculoskeletal:         General: No deformity.      Cervical back: Normal range of motion and neck supple. No rigidity or tenderness.      Right lower leg: No edema.      Left lower leg: No edema.   Skin:     General: Skin is warm and dry.   Neurological:      General: No focal deficit present.      Mental Status: She is alert and oriented to person, place, and time. Mental status is at baseline.   Psychiatric:         Attention and Perception: Attention normal.         Behavior: Behavior normal.       Significant Labs:  All labs within the past 24 hours have been reviewed.     Significant Imaging:  Labs: Reviewed

## 2022-11-15 NOTE — SUBJECTIVE & OBJECTIVE
Interval History:   Patient seen and examined at bedside.  No acute events overnight.  However, notes some epigastric burning, nausea this morning.  Notes no left, lateral neck pain or odynophagia.      Review of Systems   Constitutional:  Positive for fatigue. Negative for chills, diaphoresis and fever.   HENT:  Positive for trouble swallowing. Negative for congestion and sore throat.    Eyes:  Negative for visual disturbance.   Respiratory:  Negative for cough, shortness of breath and wheezing.    Cardiovascular:  Negative for chest pain, palpitations and leg swelling.   Gastrointestinal:  Negative for abdominal pain, nausea and vomiting.   Genitourinary:  Positive for decreased urine volume.   Musculoskeletal:  Negative for arthralgias and myalgias.   Skin:  Negative for rash.   Neurological:  Positive for weakness. Negative for dizziness, light-headedness and headaches.   Psychiatric/Behavioral:  Negative for confusion, decreased concentration and sleep disturbance.      Objective:     Vital Signs (Most Recent):  Temp: 98.8 °F (37.1 °C) (11/15/22 0750)  Pulse: 79 (11/15/22 1127)  Resp: 14 (11/15/22 0750)  BP: (!) 108/56 (11/15/22 0750)  SpO2: 99 % (11/15/22 0750)   Vital Signs (24h Range):  Temp:  [97.4 °F (36.3 °C)-99.5 °F (37.5 °C)] 98.8 °F (37.1 °C)  Pulse:  [64-79] 79  Resp:  [14-19] 14  SpO2:  [93 %-100 %] 99 %  BP: (108-138)/(56-63) 108/56     Weight: 57.6 kg (127 lb)  Body mass index is 24 kg/m².    Intake/Output Summary (Last 24 hours) at 11/15/2022 1211  Last data filed at 11/15/2022 1042  Gross per 24 hour   Intake 1988 ml   Output --   Net 1988 ml        Physical Exam  Vitals and nursing note reviewed.   Constitutional:       General: She is awake.      Appearance: She is well-developed. She is ill-appearing. She is not toxic-appearing or diaphoretic.   HENT:      Head: Normocephalic and atraumatic.      Right Ear: External ear normal.      Left Ear: External ear normal.      Nose:      Comments: +  NGT     Mouth/Throat:      Mouth: Mucous membranes are dry.   Eyes:      General: Lids are normal. No scleral icterus.        Right eye: No discharge.         Left eye: No discharge.   Cardiovascular:      Rate and Rhythm: Normal rate and regular rhythm.   Pulmonary:      Effort: Pulmonary effort is normal.      Breath sounds: Normal breath sounds. No wheezing or rhonchi.   Abdominal:      General: Bowel sounds are normal.      Palpations: Abdomen is soft.      Tenderness: There is no abdominal tenderness.   Musculoskeletal:         General: No deformity.      Cervical back: Normal range of motion and neck supple. No rigidity or tenderness.      Right lower leg: No edema.      Left lower leg: No edema.   Skin:     General: Skin is warm and dry.   Neurological:      General: No focal deficit present.      Mental Status: She is alert and oriented to person, place, and time. Mental status is at baseline.   Psychiatric:         Attention and Perception: Attention normal.         Behavior: Behavior normal.       Significant Labs: All pertinent labs within the past 24 hours have been reviewed.    Recent Results (from the past 24 hour(s))   POCT glucose    Collection Time: 11/14/22 12:16 PM   Result Value Ref Range    POCT Glucose 129 (H) 70 - 110 mg/dL   POCT glucose    Collection Time: 11/14/22  4:34 PM   Result Value Ref Range    POCT Glucose 157 (H) 70 - 110 mg/dL   POCT glucose    Collection Time: 11/14/22 11:52 PM   Result Value Ref Range    POCT Glucose 98 70 - 110 mg/dL   Magnesium    Collection Time: 11/15/22  4:46 AM   Result Value Ref Range    Magnesium 1.9 1.6 - 2.6 mg/dL   Phosphorus    Collection Time: 11/15/22  4:46 AM   Result Value Ref Range    Phosphorus 6.3 (H) 2.7 - 4.5 mg/dL   CBC auto differential    Collection Time: 11/15/22  4:46 AM   Result Value Ref Range    WBC 8.10 3.90 - 12.70 K/uL    RBC 2.47 (L) 4.00 - 5.40 M/uL    Hemoglobin 7.1 (L) 12.0 - 16.0 g/dL    Hematocrit 22.4 (L) 37.0 - 48.5 %     MCV 91 82 - 98 fL    MCH 28.7 27.0 - 31.0 pg    MCHC 31.7 (L) 32.0 - 36.0 g/dL    RDW 15.5 (H) 11.5 - 14.5 %    Platelets 201 150 - 450 K/uL    MPV 11.3 9.2 - 12.9 fL    Immature Granulocytes 0.5 0.0 - 0.5 %    Gran # (ANC) 6.8 1.8 - 7.7 K/uL    Immature Grans (Abs) 0.04 0.00 - 0.04 K/uL    Lymph # 0.9 (L) 1.0 - 4.8 K/uL    Mono # 0.4 0.3 - 1.0 K/uL    Eos # 0.0 0.0 - 0.5 K/uL    Baso # 0.01 0.00 - 0.20 K/uL    nRBC 0 0 /100 WBC    Gran % 83.5 (H) 38.0 - 73.0 %    Lymph % 10.5 (L) 18.0 - 48.0 %    Mono % 5.3 4.0 - 15.0 %    Eosinophil % 0.1 0.0 - 8.0 %    Basophil % 0.1 0.0 - 1.9 %    Differential Method Automated    Basic metabolic panel    Collection Time: 11/15/22  4:47 AM   Result Value Ref Range    Sodium 131 (L) 136 - 145 mmol/L    Potassium 4.1 3.5 - 5.1 mmol/L    Chloride 97 95 - 110 mmol/L    CO2 26 23 - 29 mmol/L    Glucose 102 70 - 110 mg/dL    BUN 84 (H) 8 - 23 mg/dL    Creatinine 6.5 (H) 0.5 - 1.4 mg/dL    Calcium 7.7 (L) 8.7 - 10.5 mg/dL    Anion Gap 8 8 - 16 mmol/L    eGFR 6.2 (A) >60 mL/min/1.73 m^2   Hepatitis B surface antigen    Collection Time: 11/15/22  4:47 AM   Result Value Ref Range    Hepatitis B Surface Ag Non-reactive Non-reactive   Hepatitis B surface antibody    Collection Time: 11/15/22  4:47 AM   Result Value Ref Range    Hep B S Ab 308.24 mIU/mL    Hep B S Ab Reactive    Hepatitis B core antibody, total    Collection Time: 11/15/22  4:47 AM   Result Value Ref Range    Hep B Core Total Ab Non-reactive Non-reactive   Hepatitis A antibody, IgM    Collection Time: 11/15/22  4:47 AM   Result Value Ref Range    Hep A IgM Non-reactive Non-reactive   POCT glucose    Collection Time: 11/15/22  5:10 AM   Result Value Ref Range    POCT Glucose 112 (H) 70 - 110 mg/dL         Significant Imaging: I have reviewed all pertinent imaging results/findings within the past 24 hours.    Imaging Results               CT Chest With Contrast (Final result)  Result time 10/17/22 14:26:36      Final result by  Lacy Camp MD (10/17/22 14:26:36)                   Impression:      Interval progression of bilateral perihilar dense consolidation with peripheral patchy areas of ground-glass opacification nonspecific as to the etiology.  Bilateral pneumonia as well as inflammatory etiologies considered.  Cardiogenic pulmonary edema considered less likely given the pattern.    Borderline sized mediastinal lymph node appears stable.    No other interval detrimental changes since 10/14/2022.    This report was flagged in Epic as abnormal.      Electronically signed by: Lacy Camp  Date:    10/17/2022  Time:    14:26               Narrative:    EXAMINATION:  CT CHEST WITH CONTRAST    CLINICAL HISTORY:  Aspiration;    TECHNIQUE:  Low dose axial images, sagittal and coronal reformations were obtained from the thoracic inlet to the lung bases following the IV administration of 75 mL of Omnipaque 350.    COMPARISON:  CT chest without contrast 10/14/2022, CTA chest 09/24/2022    FINDINGS:  Base of Neck: No significant abnormality.    Thoracic soft tissues: Unremarkable.    Aorta: Left-sided aortic arch.  No aneurysm and no significant atherosclerosis.    Heart: Normal in size.  There is no convincing pericardial effusion with prior trace effusion no longer apparent.    Pulmonary vasculature: Pulmonary arteries distribute normally with no visible significant central pulmonary thromboemboli.  Pulmonary artery caliber is normal.    Essence/Mediastinum: 9 mm in short axis lymph node in the AP window again noted.  No convincing new adenopathy.  Axillary shotty lymph nodes with fatty essence appear stable.    Airways: Patent.    Lungs/Pleura: Perihilar alveolar opacities with ground-glass component appear more prominent bilaterally as compared to the previous examination 10/14/2022 with dense consolidation centered in the perihilar region.  More patchy ground-glass opacities extend to the periphery.  There is no acute pleural  effusion.    Esophagus: Unremarkable.    Upper Abdomen: Multiple low densities are again noted in the liver left lobe too small to characterize but likely cysts largest measuring 8 mm axial image 93 series 2.  There is no new abnormality of the partially imaged upper abdomen.    Bones: There is no acute fracture or suspicious lytic or sclerotic lesion involving the imaged chest wall osseous structures.  There is spondylosis and kyphosis of the imaged spine.  No subluxation.

## 2022-11-15 NOTE — PROGRESS NOTES
Wound care order adjustment to include dakin's as wound cleanser of choice BID to wounds. Spoke with primary care team regarding non healing vaginal wounds. Recommendation for gyn consult. Team stated that they would assess area of concern.

## 2022-11-15 NOTE — PROGRESS NOTES
[facility] ([place] [address])  Nephrology  Progress Note    Patient Name: Kristin Goodman  MRN: [id number]  Admission Date: 10/17/2022  Hospital Length of Stay: 29 days  Attending Provider: Danielle Palomino MD   Primary Care Physician: Lori Hernandez MD  Principal Problem:Microscopic polyangiitis    Subjective:     HPI: Kristin Goodman is a 77 year old female with hypertension, hypothyroidism, HFpEF who presents for cough, shortness of breath, and weakness.  Patient initially presented to ER on 09/24 with hemoptysis and imaging concerning for multilobar pneumonia at which time she was discharged on a 10 day course of levofloxacin.  Patient initially had some improvement but began having worsening symptoms on 10/14.  Patient describes multiple fevers and progressive shortness of breath.  She continues to have intermittent hemoptysis.  Patient with worsening leukocytosis and limited clinical improvement despite broad-spectrum antibiotics.  She remains on cefepime.  Patient is a noted to have baseline creatinine of 1 as recent as 8/2022 but progressive worsening of creatinine in early October.  On admission patient with creatinine of 1.6 (10/17) with subsequent progression and creatinine of 3.0 at time of consultation (10/23).  Nephrology consulted for acute kidney injury.      Interval History: reports having a few unrecorded voids. Alert and oriented    Review of patient's allergies indicates:   Allergen Reactions    Ampicillin     Peaches [peach (prunus persica)] Other (See Comments)     Pt unable to state type of reaction. Information obtained from daughter who states she was informed of allergy from patient.    Penicillins      Other reaction(s): Hives    Sulfa (sulfonamide antibiotics) Rash and Hives     Current Facility-Administered Medications   Medication Frequency    0.9%  NaCl infusion (for blood administration) Q24H PRN    0.9%  NaCl infusion (for blood administration) Q24H PRN    0.9%  NaCl  infusion (for blood administration) Q24H PRN    0.9%  NaCl infusion Once    acetaminophen tablet 650 mg Q4H PRN    albuterol-ipratropium 2.5 mg-0.5 mg/3 mL nebulizer solution 3 mL Q4H PRN    aluminum-magnesium hydroxide-simethicone 200-200-20 mg/5 mL suspension 30 mL QID PRN    amLODIPine tablet 10 mg Daily    atovaquone 750 mg/5 mL oral liquid 1,500 mg Daily    bisacodyL suppository 10 mg Daily PRN    carvediloL tablet 25 mg BID    cloNIDine tablet 0.1 mg Q6H PRN    dextrose 10% bolus 125 mL PRN    dextrose 10% bolus 250 mL PRN    glucagon (human recombinant) injection 1 mg PRN    glucose chewable tablet 16 g PRN    glucose chewable tablet 24 g PRN    heparin (porcine) injection 1,000 Units PRN    heparin, porcine (PF) 100 unit/mL injection flush 500 Units PRN    hydrALAZINE tablet 100 mg Q8H    insulin aspart U-100 pen 1-10 Units Q6H PRN    levothyroxine tablet 25 mcg Before breakfast    lisinopriL tablet 40 mg Daily    melatonin tablet 6 mg Nightly PRN    methocarbamoL tablet 500 mg TID PRN    methylPREDNISolone sodium succinate injection 20 mg Daily    naloxone 0.4 mg/mL injection 0.02 mg PRN    nystatin 100,000 unit/mL suspension 500,000 Units QID    ondansetron injection 4 mg Q8H PRN    pantoprazole injection 40 mg Q12H    prochlorperazine injection Soln 5 mg Q6H PRN    QUEtiapine tablet 25 mg QHS    simethicone chewable tablet 80 mg QID PRN    sodium chloride 0.9% 250 mL flush bag 1 time in [facility]    sodium chloride 0.9% flush 10 mL PRN    sodium chloride 0.9% flush 10 mL PRN    sodium chloride 0.9% flush 10 mL PRN    sodium chloride 0.9% flush 5 mL PRN     Facility-Administered Medications Ordered in Other Encounters   Medication Frequency    celecoxib capsule 400 mg Once    fentaNYL 50 mcg/mL injection  mcg PRN    LIDOcaine (PF) 10 mg/ml (1%) injection 10 mg Once PRN    LIDOcaine (PF) 10 mg/ml (1%) injection 10 mg Once    midazolam (VERSED) 1 mg/mL injection  0.5-4 mg PRN    ropivacaine 0.2% Nimbus PainPRO Pump infusion 500 ML Continuous       Objective:     Vital Signs (Most Recent):  Temp: 98.1 °F (36.7 °C) (11/14/22 1915)  Pulse: 75 (11/14/22 1915)  Resp: 15 (11/14/22 1915)  BP: 138/63 (11/14/22 1915)  SpO2: 97 % (11/14/22 1915)  O2 Device (Oxygen Therapy): room air (11/14/22 1115) Vital Signs (24h Range):  Temp:  [97.3 °F (36.3 °C)-98.7 °F (37.1 °C)] 98.1 °F (36.7 °C)  Pulse:  [67-82] 75  Resp:  [15-20] 15  SpO2:  [93 %-100 %] 97 %  BP: ()/(55-63) 138/63     Weight: 57.6 kg (127 lb) (11/14/22 0958)  Body mass index is 24 kg/m².  Body surface area is 1.57 meters squared.    I/O last 3 completed shifts:  In: 875 [Other:525; NG/GT:200; IV Piggyback:150]  Out: -     Physical Exam  Vitals and nursing note reviewed.   Constitutional:       General: She is awake.      Appearance: She is well-developed. She is ill-appearing. She is not toxic-appearing or diaphoretic.   HENT:      Head: Normocephalic and atraumatic.      Right Ear: External ear normal.      Left Ear: External ear normal.      Nose:      Comments: + NGT     Mouth/Throat:      Mouth: Mucous membranes are dry.   Eyes:      General: Lids are normal. No scleral icterus.        Right eye: No discharge.         Left eye: No discharge.   Cardiovascular:      Rate and Rhythm: Normal rate and regular rhythm.   Pulmonary:      Effort: Pulmonary effort is normal.      Breath sounds: Normal breath sounds. No wheezing or rhonchi.   Abdominal:      General: Bowel sounds are normal.      Palpations: Abdomen is soft.      Tenderness: There is no abdominal tenderness.   Musculoskeletal:         General: No deformity.      Cervical back: Normal range of motion and neck supple. No rigidity or tenderness.      Right lower leg: No edema.      Left lower leg: No edema.   Skin:     General: Skin is warm and dry.   Neurological:      General: No focal deficit present.      Mental Status: She is alert and oriented to person,  "place, and time. Mental status is at baseline.   Psychiatric:         Attention and Perception: Attention normal.         Behavior: Behavior normal.       Significant Labs:  All labs within the past 24 hours have been reviewed.     Significant Imaging:  Labs: Reviewed    Assessment/Plan:     Acute renal failure with tubular necrosis  Patient with baseline creatinine of 1 as recent as August 2022 with progressive worsening beginning in early October 1.3 (10/13)->1.6 (10/17)->3.0 (10/23) despite IV fluids.  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  Patient noted to have new onset proteinuria and hematuria over the last 1 month.  Additionally, would consider ATN in the setting of suspected sepsis from multifocal pneumonia.      Concerns for glomerulonephritis given patient's current clinical picture. Initial urine microscopy demonstrating muddy brown casts with likely acanthocytes noted as well. MITUL positive, proteinase 3 ab weakly positive, MPO strongly positive. Patient s/p high-dose IV solumedrol x3 doses, now on Solumedrol 50mg qd. Underwent kidney bx 10/27. Rheumatology consulted, and patient started on Plex, Ritux/cytoxan. Given continually worsening renal function and uremic encephalopathy, patient underwent SLED without complication on 10/27. Transferred to floor on 10/29. Significantly improved mentation on 10/31. Underwent HD 11/1. Patient also with increasing hypernatremia so added FWF to tube feeds.      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 Second dose Ritux on 11/3. Next dose of cytoxan on 11/10. Will discuss with Rheum regarding maintenance dosing.      Recommendations:  - Reported return of urine output, however no recorded   Hold HD and do not place permacath at " this time. Hopeful for renal recovery  - continue solumedrol 20 mg qd until 11/20, then 15 for 14 days, then 12.5 for 14 days, then 10 for 14 days, then 7.5 for 14 days then possibly life long 5 mg. (per PEXIVAS trial)  - strict intake and output  - renally dose medications and avoid nephrotoxins when possible  - replete electrolytes as appropriate        Thank you for your consult. I will follow-up with patient. Please contact us if you have any additional questions.    Blue Puente MD  Nephrology  [facility] ([place])

## 2022-11-15 NOTE — ASSESSMENT & PLAN NOTE
PPI BID  Elevated HOB; feeds changed to bolus and tolerating well  Symptoms initially improved however reoccurrence on 11/13 without significant dietary changes; GI planning for EGD 11/14  TF further adjusted for smaller, more frequent bolus   s/p EGD 11/14; Normal Study

## 2022-11-16 LAB
ANION GAP SERPL CALC-SCNC: 12 MMOL/L (ref 8–16)
BUN SERPL-MCNC: 91 MG/DL (ref 8–23)
CALCIUM SERPL-MCNC: 7.4 MG/DL (ref 8.7–10.5)
CHLORIDE SERPL-SCNC: 93 MMOL/L (ref 95–110)
CO2 SERPL-SCNC: 24 MMOL/L (ref 23–29)
CREAT SERPL-MCNC: 7.5 MG/DL (ref 0.5–1.4)
EST. GFR  (NO RACE VARIABLE): 5.2 ML/MIN/1.73 M^2
GLUCOSE SERPL-MCNC: 74 MG/DL (ref 70–110)
MAGNESIUM SERPL-MCNC: 1.9 MG/DL (ref 1.6–2.6)
PHOSPHATE SERPL-MCNC: 5.7 MG/DL (ref 2.7–4.5)
POCT GLUCOSE: 107 MG/DL (ref 70–110)
POCT GLUCOSE: 153 MG/DL (ref 70–110)
POCT GLUCOSE: 83 MG/DL (ref 70–110)
POCT GLUCOSE: 90 MG/DL (ref 70–110)
POCT GLUCOSE: 91 MG/DL (ref 70–110)
POTASSIUM SERPL-SCNC: 4.1 MMOL/L (ref 3.5–5.1)
SODIUM SERPL-SCNC: 129 MMOL/L (ref 136–145)
TB INDURATION 48 - 72 HR READ: 0 MM

## 2022-11-16 PROCEDURE — 99232 PR SUBSEQUENT HOSPITAL CARE,LEVL II: ICD-10-PCS | Mod: ,,, | Performed by: HOSPITALIST

## 2022-11-16 PROCEDURE — 99232 SBSQ HOSP IP/OBS MODERATE 35: CPT | Mod: ,,, | Performed by: STUDENT IN AN ORGANIZED HEALTH CARE EDUCATION/TRAINING PROGRAM

## 2022-11-16 PROCEDURE — 94761 N-INVAS EAR/PLS OXIMETRY MLT: CPT

## 2022-11-16 PROCEDURE — 25000003 PHARM REV CODE 250: Performed by: HOSPITALIST

## 2022-11-16 PROCEDURE — 63600175 PHARM REV CODE 636 W HCPCS: Performed by: INTERNAL MEDICINE

## 2022-11-16 PROCEDURE — 80048 BASIC METABOLIC PNL TOTAL CA: CPT | Performed by: STUDENT IN AN ORGANIZED HEALTH CARE EDUCATION/TRAINING PROGRAM

## 2022-11-16 PROCEDURE — 25000003 PHARM REV CODE 250: Performed by: STUDENT IN AN ORGANIZED HEALTH CARE EDUCATION/TRAINING PROGRAM

## 2022-11-16 PROCEDURE — 99232 PR SUBSEQUENT HOSPITAL CARE,LEVL II: ICD-10-PCS | Mod: ,,, | Performed by: STUDENT IN AN ORGANIZED HEALTH CARE EDUCATION/TRAINING PROGRAM

## 2022-11-16 PROCEDURE — 25000003 PHARM REV CODE 250

## 2022-11-16 PROCEDURE — 20600001 HC STEP DOWN PRIVATE ROOM

## 2022-11-16 PROCEDURE — 84100 ASSAY OF PHOSPHORUS: CPT

## 2022-11-16 PROCEDURE — 51701 INSERT BLADDER CATHETER: CPT

## 2022-11-16 PROCEDURE — 83735 ASSAY OF MAGNESIUM: CPT

## 2022-11-16 PROCEDURE — 63600175 PHARM REV CODE 636 W HCPCS: Performed by: STUDENT IN AN ORGANIZED HEALTH CARE EDUCATION/TRAINING PROGRAM

## 2022-11-16 PROCEDURE — 97535 SELF CARE MNGMENT TRAINING: CPT

## 2022-11-16 PROCEDURE — 99232 SBSQ HOSP IP/OBS MODERATE 35: CPT | Mod: ,,, | Performed by: HOSPITALIST

## 2022-11-16 PROCEDURE — 92526 ORAL FUNCTION THERAPY: CPT

## 2022-11-16 PROCEDURE — C9113 INJ PANTOPRAZOLE SODIUM, VIA: HCPCS | Performed by: STUDENT IN AN ORGANIZED HEALTH CARE EDUCATION/TRAINING PROGRAM

## 2022-11-16 RX ORDER — SODIUM CHLORIDE 1 G/1
2000 TABLET ORAL 3 TIMES DAILY
Status: DISCONTINUED | OUTPATIENT
Start: 2022-11-16 | End: 2022-11-18

## 2022-11-16 RX ADMIN — NYSTATIN 500000 UNITS: 500000 SUSPENSION ORAL at 02:11

## 2022-11-16 RX ADMIN — QUETIAPINE FUMARATE 25 MG: 25 TABLET ORAL at 09:11

## 2022-11-16 RX ADMIN — METHYLPREDNISOLONE SODIUM SUCCINATE 20 MG: 40 INJECTION, POWDER, FOR SOLUTION INTRAMUSCULAR; INTRAVENOUS at 08:11

## 2022-11-16 RX ADMIN — SODIUM CHLORIDE 2000 MG: 1 TABLET ORAL at 03:11

## 2022-11-16 RX ADMIN — AMLODIPINE BESYLATE 10 MG: 10 TABLET ORAL at 08:11

## 2022-11-16 RX ADMIN — NYSTATIN 500000 UNITS: 500000 SUSPENSION ORAL at 08:11

## 2022-11-16 RX ADMIN — NYSTATIN 500000 UNITS: 500000 SUSPENSION ORAL at 04:11

## 2022-11-16 RX ADMIN — PANTOPRAZOLE SODIUM 40 MG: 40 INJECTION, POWDER, FOR SOLUTION INTRAVENOUS at 08:11

## 2022-11-16 RX ADMIN — HYDRALAZINE HYDROCHLORIDE 100 MG: 50 TABLET ORAL at 09:11

## 2022-11-16 RX ADMIN — HYDRALAZINE HYDROCHLORIDE 100 MG: 50 TABLET ORAL at 02:11

## 2022-11-16 RX ADMIN — SODIUM CHLORIDE 2000 MG: 1 TABLET ORAL at 09:11

## 2022-11-16 RX ADMIN — LEVOTHYROXINE SODIUM 25 MCG: 25 TABLET ORAL at 05:11

## 2022-11-16 RX ADMIN — NYSTATIN 500000 UNITS: 500000 SUSPENSION ORAL at 09:11

## 2022-11-16 RX ADMIN — CARVEDILOL 25 MG: 25 TABLET, FILM COATED ORAL at 08:11

## 2022-11-16 RX ADMIN — LISINOPRIL 40 MG: 20 TABLET ORAL at 08:11

## 2022-11-16 RX ADMIN — CARVEDILOL 25 MG: 25 TABLET, FILM COATED ORAL at 09:11

## 2022-11-16 RX ADMIN — ATOVAQUONE 1500 MG: 750 SUSPENSION ORAL at 08:11

## 2022-11-16 RX ADMIN — HYDRALAZINE HYDROCHLORIDE 100 MG: 50 TABLET ORAL at 05:11

## 2022-11-16 RX ADMIN — PANTOPRAZOLE SODIUM 40 MG: 40 INJECTION, POWDER, FOR SOLUTION INTRAVENOUS at 09:11

## 2022-11-16 NOTE — PLAN OF CARE
Problem: Adult Inpatient Plan of Care  Goal: Plan of Care Review  Outcome: Ongoing, Progressing  Goal: Patient-Specific Goal (Individualized)  Outcome: Ongoing, Progressing     Problem: Adjustment to Illness (Sepsis/Septic Shock)  Goal: Optimal Coping  Outcome: Ongoing, Progressing

## 2022-11-16 NOTE — ASSESSMENT & PLAN NOTE
SLP following  MBSS showing global weakness in swallowing as well as aspiration with thin liquids.   ENT consulted but patient did not tolerate laryngoscopy     Plan   - Discussed case with Rheumatology team, they are concerned that this dysphagia may have been from prior stroke, requesting imaging for evaluation-  CT demonstrated no acute findings or causes for dysphagia NOR did MRI   - removed NGT and place dobhoff which is more comfortable and makes swallowing easier compared to NGT.    - continue working with speech therapy   -exam concerning for thrush; nystatin swish and swallow initiated; GI consulted as concern that oropharyngeal candidiasis may be contributing to dysphagia  -Per GI, Lower suspicion for esophageal candidiasis without odynophagia however can If white plaques have been seen on oropharynx, ok to start fluconazole empirically for possible esophageal candidiasis  -EGD on 11/14 without abnormality; per GI, Suspect some aspect of esophageal dysmotility in setting of critical illness. No further testing at this time.  -diet initiated per SLP on 11/16; NG tube removed  -continue to monitor p.o. intake closely

## 2022-11-16 NOTE — PROGRESS NOTES
[facility] ([place] [address])  Nephrology  Progress Note    Patient Name: Kristin Goodman  MRN: [id number]  Admission Date: 10/17/2022  Hospital Length of Stay: 30 days  Attending Provider: Danielle Palomino MD   Primary Care Physician: Lori Hernandez MD  Principal Problem:Microscopic polyangiitis    Subjective:     HPI: Kristin Goodman is a 77 year old female with hypertension, hypothyroidism, HFpEF who presents for cough, shortness of breath, and weakness.  Patient initially presented to ER on 09/24 with hemoptysis and imaging concerning for multilobar pneumonia at which time she was discharged on a 10 day course of levofloxacin.  Patient initially had some improvement but began having worsening symptoms on 10/14.  Patient describes multiple fevers and progressive shortness of breath.  She continues to have intermittent hemoptysis.  Patient with worsening leukocytosis and limited clinical improvement despite broad-spectrum antibiotics.  She remains on cefepime.  Patient is a noted to have baseline creatinine of 1 as recent as 8/2022 but progressive worsening of creatinine in early October.  On admission patient with creatinine of 1.6 (10/17) with subsequent progression and creatinine of 3.0 at time of consultation (10/23).  Nephrology consulted for acute kidney injury.      Interval History: documented 500 cc UOP. No need for HD today    Review of patient's allergies indicates:   Allergen Reactions    Ampicillin     Peaches [peach (prunus persica)] Other (See Comments)     Pt unable to state type of reaction. Information obtained from daughter who states she was informed of allergy from patient.    Penicillins      Other reaction(s): Hives    Sulfa (sulfonamide antibiotics) Rash and Hives     Current Facility-Administered Medications   Medication Frequency    0.9%  NaCl infusion (for blood administration) Q24H PRN    0.9%  NaCl infusion Once    acetaminophen tablet 650 mg Q4H PRN     albuterol-ipratropium 2.5 mg-0.5 mg/3 mL nebulizer solution 3 mL Q4H PRN    aluminum-magnesium hydroxide-simethicone 200-200-20 mg/5 mL suspension 30 mL QID PRN    aluminum-magnesium hydroxide-simethicone 200-200-20 mg/5 mL suspension 30 mL Once    And    LIDOcaine HCl 2% oral solution 15 mL Once    amLODIPine tablet 10 mg Daily    atovaquone 750 mg/5 mL oral liquid 1,500 mg Daily    bisacodyL suppository 10 mg Daily PRN    carvediloL tablet 25 mg BID    cloNIDine tablet 0.1 mg Q6H PRN    dextrose 10% bolus 125 mL PRN    dextrose 10% bolus 250 mL PRN    glucagon (human recombinant) injection 1 mg PRN    glucose chewable tablet 16 g PRN    glucose chewable tablet 24 g PRN    heparin (porcine) injection 1,000 Units PRN    heparin, porcine (PF) 100 unit/mL injection flush 500 Units PRN    hydrALAZINE tablet 100 mg Q8H    insulin aspart U-100 pen 1-10 Units Q6H PRN    levothyroxine tablet 25 mcg Before breakfast    lisinopriL tablet 40 mg Daily    melatonin tablet 6 mg Nightly PRN    methocarbamoL tablet 500 mg TID PRN    methylPREDNISolone sodium succinate injection 20 mg Daily    naloxone 0.4 mg/mL injection 0.02 mg PRN    nystatin 100,000 unit/mL suspension 500,000 Units QID    ondansetron injection 4 mg Q8H PRN    pantoprazole injection 40 mg Q12H    prochlorperazine injection Soln 5 mg Q6H PRN    QUEtiapine tablet 25 mg QHS    simethicone chewable tablet 80 mg QID PRN    sodium chloride 0.9% 250 mL flush bag 1 time in Clinic[facility]    sodium chloride 0.9% flush 10 mL PRN    sodium chloride 0.9% flush 10 mL PRN    sodium chloride 0.9% flush 10 mL PRN    sodium chloride 0.9% flush 5 mL PRN     Facility-Administered Medications Ordered in Other Encounters   Medication Frequency    celecoxib capsule 400 mg Once    fentaNYL 50 mcg/mL injection  mcg PRN    LIDOcaine (PF) 10 mg/ml (1%) injection 10 mg Once PRN    LIDOcaine (PF) 10 mg/ml (1%) injection 10 mg Once    midazolam  (VERSED) 1 mg/mL injection 0.5-4 mg PRN    ropivacaine 0.2% Nimbus PainPRO Pump infusion 500 ML Continuous       Objective:     Vital Signs (Most Recent):  Temp: 97 °F (36.1 °C) (11/16/22 0703)  Pulse: 68 (11/16/22 0703)  Resp: 20 (11/16/22 0703)  BP: 133/62 (11/16/22 0703)  SpO2: 96 % (11/16/22 0703)  O2 Device (Oxygen Therapy): room air (11/16/22 0703) Vital Signs (24h Range):  Temp:  [97 °F (36.1 °C)-98.8 °F (37.1 °C)] 97 °F (36.1 °C)  Pulse:  [68-78] 68  Resp:  [17-20] 20  SpO2:  [94 %-96 %] 96 %  BP: (117-135)/(56-63) 133/62     Weight: 57.6 kg (127 lb) (11/14/22 0958)  Body mass index is 24 kg/m².  Body surface area is 1.57 meters squared.    I/O last 3 completed shifts:  In: 1583 [P.O.:240; NG/GT:1343]  Out: 500 [Urine:500]    Physical Exam  Vitals and nursing note reviewed.   Constitutional:       General: She is awake.      Appearance: She is well-developed. She is ill-appearing. She is not toxic-appearing or diaphoretic.   HENT:      Head: Normocephalic and atraumatic.      Right Ear: External ear normal.      Left Ear: External ear normal.      Nose:      Comments: + NGT     Mouth/Throat:      Mouth: Mucous membranes are dry.   Eyes:      General: Lids are normal. No scleral icterus.        Right eye: No discharge.         Left eye: No discharge.   Cardiovascular:      Rate and Rhythm: Normal rate and regular rhythm.   Pulmonary:      Effort: Pulmonary effort is normal.      Breath sounds: Normal breath sounds. No wheezing or rhonchi.   Abdominal:      General: Bowel sounds are normal.      Palpations: Abdomen is soft.      Tenderness: There is no abdominal tenderness.   Musculoskeletal:         General: No deformity.      Cervical back: Normal range of motion and neck supple. No rigidity or tenderness.      Right lower leg: No edema.      Left lower leg: No edema.   Skin:     General: Skin is warm and dry.   Neurological:      General: No focal deficit present.      Mental Status: She is alert and  "oriented to person, place, and time. Mental status is at baseline.   Psychiatric:         Attention and Perception: Attention normal.         Behavior: Behavior normal.       Significant Labs:  All labs within the past 24 hours have been reviewed.     Significant Imaging:  Labs: Reviewed    Assessment/Plan:     Encounter for continuous renal replacement therapy (CRRT) for acute renal failure  Patient with baseline creatinine of 1 as recent as August 2022 with progressive worsening beginning in early October 1.3 (10/13)->1.6 (10/17)->3.0 (10/23) despite IV fluids.  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  Patient noted to have new onset proteinuria and hematuria over the last 1 month.  Additionally, would consider ATN in the setting of suspected sepsis from multifocal pneumonia.      Concerns for glomerulonephritis given patient's current clinical picture. Initial urine microscopy demonstrating muddy brown casts with likely acanthocytes noted as well. MITUL positive, proteinase 3 ab weakly positive, MPO strongly positive. Patient s/p high-dose IV solumedrol x3 doses, now on Solumedrol 50mg qd. Underwent kidney bx 10/27. Rheumatology consulted, and patient started on Plex, Ritux/cytoxan. Given continually worsening renal function and uremic encephalopathy, patient underwent SLED without complication on 10/27. Transferred to floor on 10/29. Significantly improved mentation on 10/31. Underwent HD 11/1. Patient also with increasing hypernatremia so added FWF to tube feeds.      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 Second dose Ritux on 11/3. Next dose of cytoxan on 11/10. Will discuss with Rheum regarding maintenance dosing.      Recommendations:  - Signs of renal recovery 11/1  - No " need for HD today, will continue to monitor  - For hyponatremia can utilize 2 grams of sodium chloride tablets TID for likely SIADH  - continue solumedrol 20 mg qd until 11/20, then 15 for 14 days, then 12.5 for 14 days, then 10 for 14 days, then 7.5 for 14 days then possibly life long 5 mg. (per PEXIVAS trial)  - strict intake and output  - renally dose medications and avoid nephrotoxins when possible  - replete electrolytes as appropriate        Thank you for your consult. I will follow-up with patient. Please contact us if you have any additional questions.    Blue Puente MD  Nephrology  [facility] ([place]

## 2022-11-16 NOTE — PROGRESS NOTES
[facility] - Intensive [facility] ([place] [id number])  [facility]  Progress Note    Patient Name: Kristin Goodman  MRN: [id number]  Patient Class: IP- Inpatient   Admission Date: 10/17/2022  Length of Stay: 30 days  Attending Physician: Danielle Palomino MD  Primary Care Provider: Lori Hernandez MD        Subjective:     Principal Problem:Microscopic polyangiitis        HPI:  Kristin Goodman is a 77 y.o. female with a past medical history of HTN, hypothyroidism, HFpEF, and osteoarthritis of the arm who has presented to the ED for cough, SOB, and weakness. Daughter is present at the bedside. Patient presented to the ED on 9/24 with hemoptysis, CT and CXR showed high suspicion for multilobar pneumonia. Patient was discharged with a 10-day course of levofloxacin and an albuterol inhaler. Patient followed up with her PCP on 10/4 with slight improvement in symptoms and went back to work. Patient continued to have worsening symptoms and presented to the ED again on 10/14 for evaluation and no interventions were done. Patient endorses fevers over the last few days up to 102.4 F with progressive SOB. She endorses hemoptysis with moderate amount of blood, generalized weakness, productive cough, SOB, and loss of appetite. Denies chest pain, nausea, vomiting, abdominal pain, or urinary changes.    ED: hypertensive up to 219/93 and tachycardic up to 117. Oxygen saturation on 92% on RA, placed on 5L NC with sats >97%. CBC remarkable for leukocytosis of 16.03 and Hb 7.7. K 3.3. Cr 1.6, baseline ~1.0. . Troponin 0.061. COVID and flu negative. EKG NSR. CT chest with contrast pending at time of admission. Given home amlodipine and lisinopril. Given 500mL NS, IV azithromycin, cefepime and vanc.       Overview/Hospital Course:  Ms. Goodman was admitted to Hospital Medicine for management of multifocal pneumonia and acute hypoxemic respiratory failure after presenting to the ED with fevers, cough, hemoptysis, and dyspnea. She required  escalation to the Medical ICU due to abrupt decline in her respiratory status, associated with hemoptysis and requiring NIPPV. CT chest showed bilateral patchy ground glass opacities. Labs additionally were notable for acute renal failure on CKD3. Pulmonology was consulted while under the care of [facility] and felt this picture was consistent with diffuse alveolar hemorrhage.     Her workup revealed a strongly positive MPO Ab consistent with clinical picture of microscopic polyangiitis with pulmonary and renal involvement. She underwent Trialysis catheter placement 10/25/2022 in the Medical ICU. 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     Rheumatology was consulted, extensive workup revealed MITUL + 1:2560 homogenous, +dsDNA, normal complements, PR3 1.2 (slight +),  (+), cANCA + 1:80, pANCA neg, GBMAb neg, trace cryos. Due to concern for MPA, she was started on pulse dose IV methylprednisolone 1000mg for 3 days, and plasmapheresis under the guidance of Transfusion Medicine. She remains on a steroid taper and has completed PLEX. She additionally received rituximab and cyclophosphamide. OI prophylaxis was provided with atovaquone due to a sulfa allergy.     Nephrology was consulted for evaluation of acute renal failure in the setting of MPA. She did require SLED in the ICU for renal clearance and remains on intermittent hemodialysis. She is expected to continue HD once discharged.     Her acute hypoxemic respiratory failure improved and she was weaned off of supplemental oxygen. She stepped down to [facility] 10/28.     She underwent MBBS for evaluation of dysphagia, which showed global delayed initiation of swallow and aspiration with thin liquids. Due to  concern for possible prior stroke, head imaging was completed and negative for acute finding. She is NPO with NG tube. ENT was consulted for evaluation of dysphagia and cervical adenopathy.  Patient did not tolerate laryngoscopy; unable to visualize vocal cords but given her lack of hoarseness, they did not suspect vocal fold paresis/paralysis. MRI recommended by rheum to fully rule out any potential neurological cause of the patient's dysphagia; but demonstrated   remote left thalamic lacunar type infarct and punctate remote left cerebellar infarcts.  She is continuing to work with speech therapy.  EGD completed 11/14 without abnormalities.  Per GI, Suspect some aspect of esophageal dysmotility in setting of critical illness. No further testing at this time.  Per SLP, diet advanced on 11/15 and NG tube removed.    Her other chronic medical conditions including hypothyroidism, diastolic CHF, and hypertension were managed with her home medications, with dose adjustments as needed.         Interval History:   Patient seen and examined at bedside.  No acute events overnight.  Denies epigastric burning, nausea this morning.  Notes no left, lateral neck pain or odynophagia.  Doing well with p.o. intake; notes that she ate more than half of her breakfast.  Also endorses some urine output.      Review of Systems   Constitutional:  Positive for fatigue. Negative for chills, diaphoresis and fever.   HENT:  Negative for congestion and sore throat.    Eyes:  Negative for visual disturbance.   Respiratory:  Negative for cough, shortness of breath and wheezing.    Cardiovascular:  Negative for chest pain, palpitations and leg swelling.   Gastrointestinal:  Negative for abdominal pain, nausea and vomiting.   Genitourinary:  Positive for decreased urine volume.   Musculoskeletal:  Negative for arthralgias and myalgias.   Skin:  Negative for rash.   Neurological:  Positive for weakness. Negative for dizziness, light-headedness and  headaches.   Psychiatric/Behavioral:  Negative for confusion, decreased concentration and sleep disturbance.      Objective:     Vital Signs (Most Recent):  Temp: 97 °F (36.1 °C) (11/16/22 0703)  Pulse: 68 (11/16/22 0703)  Resp: 20 (11/16/22 0703)  BP: 133/62 (11/16/22 0703)  SpO2: 96 % (11/16/22 0703)   Vital Signs (24h Range):  Temp:  [97 °F (36.1 °C)-98.8 °F (37.1 °C)] 97 °F (36.1 °C)  Pulse:  [68-78] 68  Resp:  [17-20] 20  SpO2:  [94 %-96 %] 96 %  BP: (117-135)/(56-63) 133/62     Weight: 57.6 kg (127 lb)  Body mass index is 24 kg/m².    Intake/Output Summary (Last 24 hours) at 11/16/2022 1456  Last data filed at 11/16/2022 0611  Gross per 24 hour   Intake 120 ml   Output 500 ml   Net -380 ml        Physical Exam  Vitals and nursing note reviewed.   Constitutional:       General: She is awake.      Appearance: She is well-developed. She is ill-appearing. She is not toxic-appearing or diaphoretic.   HENT:      Head: Normocephalic and atraumatic.      Right Ear: External ear normal.      Left Ear: External ear normal.      Mouth/Throat:      Mouth: Mucous membranes are dry.   Eyes:      General: Lids are normal. No scleral icterus.        Right eye: No discharge.         Left eye: No discharge.   Cardiovascular:      Rate and Rhythm: Normal rate and regular rhythm.   Pulmonary:      Effort: Pulmonary effort is normal.      Breath sounds: Normal breath sounds. No wheezing or rhonchi.   Abdominal:      General: Bowel sounds are normal.      Palpations: Abdomen is soft.      Tenderness: There is no abdominal tenderness.   Genitourinary:     Comments: Patient with skin breakdown around labia majora; no tenderness, erythema, fluctuance - suspect moisture related skin breakdown  Musculoskeletal:         General: No deformity.      Cervical back: Normal range of motion and neck supple. No rigidity or tenderness.      Right lower leg: No edema.      Left lower leg: No edema.   Skin:     General: Skin is warm and dry.    Neurological:      General: No focal deficit present.      Mental Status: She is alert and oriented to person, place, and time. Mental status is at baseline.   Psychiatric:         Attention and Perception: Attention normal.         Behavior: Behavior normal.       Significant Labs: All pertinent labs within the past 24 hours have been reviewed.    Recent Results (from the past 24 hour(s))   POCT glucose    Collection Time: 11/15/22  3:53 PM   Result Value Ref Range    POCT Glucose 192 (H) 70 - 110 mg/dL   POCT glucose    Collection Time: 11/16/22 12:55 AM   Result Value Ref Range    POCT Glucose 91 70 - 110 mg/dL   Magnesium    Collection Time: 11/16/22  5:13 AM   Result Value Ref Range    Magnesium 1.9 1.6 - 2.6 mg/dL   Phosphorus    Collection Time: 11/16/22  5:13 AM   Result Value Ref Range    Phosphorus 5.7 (H) 2.7 - 4.5 mg/dL   Basic metabolic panel    Collection Time: 11/16/22  5:13 AM   Result Value Ref Range    Sodium 129 (L) 136 - 145 mmol/L    Potassium 4.1 3.5 - 5.1 mmol/L    Chloride 93 (L) 95 - 110 mmol/L    CO2 24 23 - 29 mmol/L    Glucose 74 70 - 110 mg/dL    BUN 91 (H) 8 - 23 mg/dL    Creatinine 7.5 (H) 0.5 - 1.4 mg/dL    Calcium 7.4 (L) 8.7 - 10.5 mg/dL    Anion Gap 12 8 - 16 mmol/L    eGFR 5.2 (A) >60 mL/min/1.73 m^2   POCT glucose    Collection Time: 11/16/22  5:23 AM   Result Value Ref Range    POCT Glucose 83 70 - 110 mg/dL   POCT glucose    Collection Time: 11/16/22  7:04 AM   Result Value Ref Range    POCT Glucose 90 70 - 110 mg/dL   POCT glucose    Collection Time: 11/16/22 11:25 AM   Result Value Ref Range    POCT Glucose 107 70 - 110 mg/dL         Significant Imaging: I have reviewed all pertinent imaging results/findings within the past 24 hours.    Imaging Results               CT Chest With Contrast (Final result)  Result time 10/17/22 14:26:36      Final result by Lacy Camp MD (10/17/22 14:26:36)                   Impression:      Interval progression of bilateral perihilar  dense consolidation with peripheral patchy areas of ground-glass opacification nonspecific as to the etiology.  Bilateral pneumonia as well as inflammatory etiologies considered.  Cardiogenic pulmonary edema considered less likely given the pattern.    Borderline sized mediastinal lymph node appears stable.    No other interval detrimental changes since 10/14/2022.    This report was flagged in Epic as abnormal.      Electronically signed by: Lacy Camp  Date:    10/17/2022  Time:    14:26               Narrative:    EXAMINATION:  CT CHEST WITH CONTRAST    CLINICAL HISTORY:  Aspiration;    TECHNIQUE:  Low dose axial images, sagittal and coronal reformations were obtained from the thoracic inlet to the lung bases following the IV administration of 75 mL of Omnipaque 350.    COMPARISON:  CT chest without contrast 10/14/2022, CTA chest 09/24/2022    FINDINGS:  Base of Neck: No significant abnormality.    Thoracic soft tissues: Unremarkable.    Aorta: Left-sided aortic arch.  No aneurysm and no significant atherosclerosis.    Heart: Normal in size.  There is no convincing pericardial effusion with prior trace effusion no longer apparent.    Pulmonary vasculature: Pulmonary arteries distribute normally with no visible significant central pulmonary thromboemboli.  Pulmonary artery caliber is normal.    Essence/Mediastinum: 9 mm in short axis lymph node in the AP window again noted.  No convincing new adenopathy.  Axillary shotty lymph nodes with fatty essence appear stable.    Airways: Patent.    Lungs/Pleura: Perihilar alveolar opacities with ground-glass component appear more prominent bilaterally as compared to the previous examination 10/14/2022 with dense consolidation centered in the perihilar region.  More patchy ground-glass opacities extend to the periphery.  There is no acute pleural effusion.    Esophagus: Unremarkable.    Upper Abdomen: Multiple low densities are again noted in the liver left lobe too small to  characterize but likely cysts largest measuring 8 mm axial image 93 series 2.  There is no new abnormality of the partially imaged upper abdomen.    Bones: There is no acute fracture or suspicious lytic or sclerotic lesion involving the imaged chest wall osseous structures.  There is spondylosis and kyphosis of the imaged spine.  No subluxation.                                          Assessment/Plan:      * Microscopic polyangiitis  As of 10/26/22 strongly positive MPO Ab (in contrast to borderline positive PR3), consistent with clinical picture of microscopic polyangiitis with pulmonary, and renal involvement after presenting with acute hypoxemic respiratory failure, hemoptysis, and acute renal failure.  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   3)  CHANGES SUGGESTIVE OF FOCAL ACUTE PYELONEPHRITIS.   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 Now following PEXIVAS steroid taper. Currently on IV Solumedrol 20 mg daily.   - plan for 20mg IV daily on 11/6 for 14 days (last dose of 20mg equivalent is 11/20/2022).   - apheresis: underwent PLEX 10/26, 10/27, 10/30, 11/1, 11/2, 11/3  - rituximab every 7  days for 4 doses: Dose #1: 10/27, #2 11/3, #3 11/10; Next Rituximab 375 mg/m^2 due Nov 17th   - cyclophosphamide every 14 days for 2 doses: 10/27, 11/10  - Opportunistic Infection ppx: atovaquone 1500mg PO daily  - GI bleed prophylaxis: pantoprazole 40mg daily     Gastric reflux  PPI BID  Elevated HOB; feeds changed to bolus and tolerating well  Symptoms initially improved however reoccurrence on 11/13 without significant dietary changes; GI planning for EGD 11/14  TF further adjusted for smaller, more frequent bolus   s/p EGD 11/14; Normal Study      Gastrointestinal hemorrhage with melena  Starting having hemoptysis and melena with associated acute blood loss anemia earlier in hospital stay. Patient with known internal hemorrhoids, mild sigmoid diverticulosis, history of gastritis and + H pylori (2012 EGD).   - GI consulted 10/19, unable to perform EGD due to instability and respiratory failure at the time    Plan  - patient unable to take PO PPI due to NGT removal on 11/5, transition to IV PPI 40mg pantoprazole daily, return to per NG tube once replaced  - s/p EGD 11/14; Normal Study  - PPI transitioned to BID as concern for GERD  - Monitor CBC closely for signs of recurrent bleed    Dysphagia  SLP following  MBSS showing global weakness in swallowing as well as aspiration with thin liquids.   ENT consulted but patient did not tolerate laryngoscopy     Plan   - Discussed case with Rheumatology team, they are concerned that this dysphagia may have been from prior stroke, requesting imaging for evaluation-  CT demonstrated no acute findings or causes for dysphagia NOR did MRI   - removed NGT and place dobhoff which is more comfortable and makes swallowing easier compared to NGT.    - continue working with speech therapy   -exam concerning for thrush; nystatin swish and swallow initiated; GI consulted as concern that oropharyngeal candidiasis may be contributing to dysphagia  -Per GI, Lower suspicion for esophageal  candidiasis without odynophagia however can If white plaques have been seen on oropharynx, ok to start fluconazole empirically for possible esophageal candidiasis  -EGD on 11/14 without abnormality; per GI, Suspect some aspect of esophageal dysmotility in setting of critical illness. No further testing at this time.  -diet initiated per SLP on 11/16; NG tube removed  -continue to monitor p.o. intake closely    Moderate malnutrition  Nutrition consulted. Most recent weight and BMI monitored-          Malnutrition (Moderate to Severe)  Weight Loss (Malnutrition): 7.5% in 3 months              Measurements:  Wt Readings from Last 1 Encounters:   11/09/22 63 kg (138 lb 14.2 oz)   Body mass index is 26.24 kg/m².    Recommendations: Recommendation/Intervention: 1. As tolerated, increase TF rate (of Novasource) to 35 mL/hr  - 2. RD to monitor & follow-up.  Goals: Meet % EEN, EPN by RD f/u date    Patient has been screened and assessed by RD. RD will follow patient.      Encounter for continuous renal replacement therapy (CRRT) for acute renal failure  Due to microscopic polyangiitis. Remains on hemodialysis intermittently.   - Baseline creatinine 1.0-1.2.   - New onset proteinuria/hematuria.   - Renal biopsy completed and   - Trialysis in place for intermittent Hemodialysis    Plan  - Nephrology consulted, appreciate management  - management of MPA as noted separately  - Renal function panel daily  - renally dose all medications  - intermittent Hemodialysis as indicated, per Nephrology     Acute hypoxemic respiratory failure  Multifocal pneumonia  Hemoptysis  Dyspnea  Diffuse Alveolar Hemorrahge  In the setting of a new diagnosis of microscopic polyangiitis, presented with fevers, dyspnea,.  - Chest imaging was consistent with diffuse alveolar hemorrhage.  CT chest wc 10/17 with bl perihilar dense consolidations w/ peripheral patcy areas of GGO, BL PNA, less c/f cardiogenic pulmonary edema.  - Patient upgraded to  Medical ICU 10/23/22 with abrupt respiratory decline requiring NIPPV, improved with high dose IV steroids, plasmapheresis, emergent hemodialysis.   - Unable to perform bronchoscopy in the Medical ICU due to tenuous respiratory status  - As of 11/6, hypoxemia has significantly improved    Plan:  - weaned to room air  - continue management of underlying triggers with steroid and immunosuppressants  - incentive spirometry and respiratory hygiene    Lymphadenopathy of head and neck  - Has bilateral neck gland swelling with tenderness,consulted ENT  - No surgical intervention indicated   - Adenopathy likely reactive in nature   - Recommend further workup if it does not resolve within next 2 weeks     Acute blood loss anemia  In the setting of GI bleed with melena  - Evaluated by GI, see GI bleed documentation  - Last transfusion 10/28, Hgb slowly trending down since then  - Hgb 6.9 on 11/5      Plan  - Monitor CBC Daily   - Treat with pRBC transfusion PRN Hgb <7  - qualifies for transfusion on 11/5 with Hgb 6.9, 1u pRBC ordered  -stable       Chronic diastolic heart failure  Pulmonary Hypertension due to left heart disease  TTE (10/2022) EF 65%, G1DD  Home meds: Lisinopril, Toprol     No signs or symptoms to suggest acute exacerbation    Plan  - Active volume control with intermittent Hemodialysis per Nephrology   - Daily weights (standing if tolerated)  - Strict I/Os  - Fluid restriction 1.5 day  - Cardiac diet w/ 2 g Na restriction      Primary hypertension  BP Readings from Last 1 Encounters:   11/13/22 130/60     - Patient is unable to tolerate PO mediations, all meds to be administered via NG tube  -Will continue to monitor blood pressure trend closely and adjust antihypertensive regimen as clinically indicated and tolerated.    amLODIPine tablet 10 mg, 10 mg, Per NG tube, Daily    carvediloL tablet 12.5 mg, 12.5 mg, Per NG tube, BID    hydrALAZINE tablet 25 mg, 25 mg, Per NG tube, Q8H    lisinopriL tablet 40  mg, 40 mg, Per NG tube, Daily      Hypothyroid  - Continue synthroid 25 mcg  - TSH 0.585 10/27/22      VTE Risk Mitigation (From admission, onward)         Ordered     heparin, porcine (PF) 100 unit/mL injection flush 500 Units  As needed (PRN)         11/10/22 1430     heparin (porcine) injection 1,000 Units  As needed (PRN)         11/09/22 1601     heparin (porcine) injection 1,000 Units  As needed (PRN)         11/08/22 0854     Place sequential compression device  Until discontinued         10/25/22 1731     IP VTE HIGH RISK PATIENT  Once         10/17/22 1354     Reason for No Pharmacological VTE Prophylaxis  Once        Question:  Reasons:  Answer:  Risk of Bleeding    10/17/22 1354                Discharge Planning   ANTHONY: 11/18/2022     Code Status: Full Code   Is the patient medically ready for discharge?: No    Reason for patient still in hospital (select all that apply): Patient trending condition, Consult recommendations and Pending disposition  Discharge Plan A: Skilled Nursing Facility   Discharge Delays: None known at this time              Danielle Palomino MD  Department of Hospital Medicine   [facility] - Intensive Care ([facility]

## 2022-11-16 NOTE — PT/OT/SLP PROGRESS
"Speech Language Pathology Treatment    Patient Name:  Kristin Goodman   MRN:  [id number]  Admitting Diagnosis: Microscopic polyangiitis    Recommendations:                 General Recommendations:  Dysphagia therapy  Diet recommendations:  Mechanical soft, Liquid Diet Level: Thin   Aspiration Precautions: 1 bite/sip at a time, Avoid talking while eating, Eliminate distractions, Feed only when awake/alert, Frequent oral care, HOB to 90 degrees, Small bites/sips, and Strict aspiration precautions   General Precautions: Standard, aspiration, fall  Communication strategies:  none    Subjective     Pt awake/alert.   "I did good today."    Pain/Comfort:  Pain Rating 1: 0/10  Pain Rating Post-Intervention 1: 0/10    Respiratory Status: Room air    Objective:     Has the patient been evaluated by SLP for swallowing?   Yes  Keep patient NPO? No   Pt found awake/alert eating breakfast. Daughter was present at bedside. Pt and daughter expressed concerns in regard to meal preferences and desire to bring food from home. SLP explained pt is on a strict renal diet and advised to speak to MD first. Pt was seen with apple juice via cup sips x2, small straw sips x4, puree (pears) tsp x4, and eggs with grits x5 self-fed small bites. Pt demonstrated adequate bolus control and timely initiation of swallow across all consistencies. No overt s/sx of airway compromise observed across all consistencies. SLP provided education on aspiration precautions/safety and SLP POC. Pt and daughter verbalized understanding.       Assessment:     Kristin Goodman is a 77 y.o. female with an SLP diagnosis of Dysphagia.      Goals:   Multidisciplinary Problems       SLP Goals          Problem: SLP    Goal Priority Disciplines Outcome   SLP Goal     SLP Ongoing, Progressing   Description: Speech Language Pathology Goals  Goals expected to be met by 11/14/22    1. Pt will participate in ongoing assessment of swallow function to determine safest, least restrictive " means of nutrition/hydration  2. Educate Pt and family on aspiration precautions and SLP POC  3. Pt will tolerate trials of nectar-thickened liquids w/o overt S/S aspiration, MIN A  4. Pt will complete dysphagia exercises to improve timing of initiation of swallow x5 with 90% accuracy, MIN A                         Plan:     Patient to be seen:  4 x/week   Plan of Care expires:  11/29/22  Plan of Care reviewed with:  daughter, patient   SLP Follow-Up:  Yes       Discharge recommendations:  nursing facility, skilled   Barriers to Discharge:  None    Time Tracking:     SLP Treatment Date:   11/16/22  Speech Start Time:  0825  Speech Stop Time:  0845     Speech Total Time (min):  20 min    Billable Minutes: Treatment Swallowing Dysfunction 12 and Self Care/Home Management Training 8    11/16/2022

## 2022-11-16 NOTE — ASSESSMENT & PLAN NOTE
"Patient with baseline creatinine of 1 as recent as August 2022 with progressive worsening beginning in early October 1.3 (10/13)->1.6 (10/17)->3.0 (10/23) despite IV fluids.  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  Patient noted to have new onset proteinuria and hematuria over the last 1 month.  Additionally, would consider ATN in the setting of suspected sepsis from multifocal pneumonia.      Concerns for glomerulonephritis given patient's current clinical picture. Initial urine microscopy demonstrating muddy brown casts with likely acanthocytes noted as well. MITUL positive, proteinase 3 ab weakly positive, MPO strongly positive. Patient s/p high-dose IV solumedrol x3 doses, now on Solumedrol 50mg qd. Underwent kidney bx 10/27. Rheumatology consulted, and patient started on Plex, Ritux/cytoxan. Given continually worsening renal function and uremic encephalopathy, patient underwent SLED without complication on 10/27. Transferred to floor on 10/29. Significantly improved mentation on 10/31. Underwent HD 11/1. Patient also with increasing hypernatremia so added FWF to tube feeds.      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 Second dose Ritux on 11/3. Next dose of cytoxan on 11/10. Will discuss with Rheum regarding maintenance dosing.      Recommendations:  - Signs of renal recovery 11/1  - No need for HD today, will continue to monitor  - For hyponatremia can utilize 2 grams of sodium chloride tablets TID for likely SIADH  - continue solumedrol 20 mg qd until 11/20, then 15 for 14 days, then 12.5 for 14 days, then 10 for 14 days, then 7.5 for 14 days then possibly life long 5 mg. (per PEXIVAS trial)  - strict intake and output  - renally dose medications and avoid nephrotoxins " when possible  - replete electrolytes as appropriate

## 2022-11-16 NOTE — SUBJECTIVE & OBJECTIVE
Interval History:   Patient seen and examined at bedside.  No acute events overnight.  Denies epigastric burning, nausea this morning.  Notes no left, lateral neck pain or odynophagia.  Doing well with p.o. intake; notes that she ate more than half of her breakfast.  Also endorses some urine output.      Review of Systems   Constitutional:  Positive for fatigue. Negative for chills, diaphoresis and fever.   HENT:  Negative for congestion and sore throat.    Eyes:  Negative for visual disturbance.   Respiratory:  Negative for cough, shortness of breath and wheezing.    Cardiovascular:  Negative for chest pain, palpitations and leg swelling.   Gastrointestinal:  Negative for abdominal pain, nausea and vomiting.   Genitourinary:  Positive for decreased urine volume.   Musculoskeletal:  Negative for arthralgias and myalgias.   Skin:  Negative for rash.   Neurological:  Positive for weakness. Negative for dizziness, light-headedness and headaches.   Psychiatric/Behavioral:  Negative for confusion, decreased concentration and sleep disturbance.      Objective:     Vital Signs (Most Recent):  Temp: 97 °F (36.1 °C) (11/16/22 0703)  Pulse: 68 (11/16/22 0703)  Resp: 20 (11/16/22 0703)  BP: 133/62 (11/16/22 0703)  SpO2: 96 % (11/16/22 0703)   Vital Signs (24h Range):  Temp:  [97 °F (36.1 °C)-98.8 °F (37.1 °C)] 97 °F (36.1 °C)  Pulse:  [68-78] 68  Resp:  [17-20] 20  SpO2:  [94 %-96 %] 96 %  BP: (117-135)/(56-63) 133/62     Weight: 57.6 kg (127 lb)  Body mass index is 24 kg/m².    Intake/Output Summary (Last 24 hours) at 11/16/2022 1456  Last data filed at 11/16/2022 0611  Gross per 24 hour   Intake 120 ml   Output 500 ml   Net -380 ml        Physical Exam  Vitals and nursing note reviewed.   Constitutional:       General: She is awake.      Appearance: She is well-developed. She is ill-appearing. She is not toxic-appearing or diaphoretic.   HENT:      Head: Normocephalic and atraumatic.      Right Ear: External ear normal.       Left Ear: External ear normal.      Mouth/Throat:      Mouth: Mucous membranes are dry.   Eyes:      General: Lids are normal. No scleral icterus.        Right eye: No discharge.         Left eye: No discharge.   Cardiovascular:      Rate and Rhythm: Normal rate and regular rhythm.   Pulmonary:      Effort: Pulmonary effort is normal.      Breath sounds: Normal breath sounds. No wheezing or rhonchi.   Abdominal:      General: Bowel sounds are normal.      Palpations: Abdomen is soft.      Tenderness: There is no abdominal tenderness.   Genitourinary:     Comments: Patient with skin breakdown around labia majora; no tenderness, erythema, fluctuance - suspect moisture related skin breakdown  Musculoskeletal:         General: No deformity.      Cervical back: Normal range of motion and neck supple. No rigidity or tenderness.      Right lower leg: No edema.      Left lower leg: No edema.   Skin:     General: Skin is warm and dry.   Neurological:      General: No focal deficit present.      Mental Status: She is alert and oriented to person, place, and time. Mental status is at baseline.   Psychiatric:         Attention and Perception: Attention normal.         Behavior: Behavior normal.       Significant Labs: All pertinent labs within the past 24 hours have been reviewed.    Recent Results (from the past 24 hour(s))   POCT glucose    Collection Time: 11/15/22  3:53 PM   Result Value Ref Range    POCT Glucose 192 (H) 70 - 110 mg/dL   POCT glucose    Collection Time: 11/16/22 12:55 AM   Result Value Ref Range    POCT Glucose 91 70 - 110 mg/dL   Magnesium    Collection Time: 11/16/22  5:13 AM   Result Value Ref Range    Magnesium 1.9 1.6 - 2.6 mg/dL   Phosphorus    Collection Time: 11/16/22  5:13 AM   Result Value Ref Range    Phosphorus 5.7 (H) 2.7 - 4.5 mg/dL   Basic metabolic panel    Collection Time: 11/16/22  5:13 AM   Result Value Ref Range    Sodium 129 (L) 136 - 145 mmol/L    Potassium 4.1 3.5 - 5.1 mmol/L     Chloride 93 (L) 95 - 110 mmol/L    CO2 24 23 - 29 mmol/L    Glucose 74 70 - 110 mg/dL    BUN 91 (H) 8 - 23 mg/dL    Creatinine 7.5 (H) 0.5 - 1.4 mg/dL    Calcium 7.4 (L) 8.7 - 10.5 mg/dL    Anion Gap 12 8 - 16 mmol/L    eGFR 5.2 (A) >60 mL/min/1.73 m^2   POCT glucose    Collection Time: 11/16/22  5:23 AM   Result Value Ref Range    POCT Glucose 83 70 - 110 mg/dL   POCT glucose    Collection Time: 11/16/22  7:04 AM   Result Value Ref Range    POCT Glucose 90 70 - 110 mg/dL   POCT glucose    Collection Time: 11/16/22 11:25 AM   Result Value Ref Range    POCT Glucose 107 70 - 110 mg/dL         Significant Imaging: I have reviewed all pertinent imaging results/findings within the past 24 hours.    Imaging Results               CT Chest With Contrast (Final result)  Result time 10/17/22 14:26:36      Final result by Lacy Camp MD (10/17/22 14:26:36)                   Impression:      Interval progression of bilateral perihilar dense consolidation with peripheral patchy areas of ground-glass opacification nonspecific as to the etiology.  Bilateral pneumonia as well as inflammatory etiologies considered.  Cardiogenic pulmonary edema considered less likely given the pattern.    Borderline sized mediastinal lymph node appears stable.    No other interval detrimental changes since 10/14/2022.    This report was flagged in Epic as abnormal.      Electronically signed by: Lacy Camp  Date:    10/17/2022  Time:    14:26               Narrative:    EXAMINATION:  CT CHEST WITH CONTRAST    CLINICAL HISTORY:  Aspiration;    TECHNIQUE:  Low dose axial images, sagittal and coronal reformations were obtained from the thoracic inlet to the lung bases following the IV administration of 75 mL of Omnipaque 350.    COMPARISON:  CT chest without contrast 10/14/2022, CTA chest 09/24/2022    FINDINGS:  Base of Neck: No significant abnormality.    Thoracic soft tissues: Unremarkable.    Aorta: Left-sided aortic arch.  No aneurysm  and no significant atherosclerosis.    Heart: Normal in size.  There is no convincing pericardial effusion with prior trace effusion no longer apparent.    Pulmonary vasculature: Pulmonary arteries distribute normally with no visible significant central pulmonary thromboemboli.  Pulmonary artery caliber is normal.    Essence/Mediastinum: 9 mm in short axis lymph node in the AP window again noted.  No convincing new adenopathy.  Axillary shotty lymph nodes with fatty essence appear stable.    Airways: Patent.    Lungs/Pleura: Perihilar alveolar opacities with ground-glass component appear more prominent bilaterally as compared to the previous examination 10/14/2022 with dense consolidation centered in the perihilar region.  More patchy ground-glass opacities extend to the periphery.  There is no acute pleural effusion.    Esophagus: Unremarkable.    Upper Abdomen: Multiple low densities are again noted in the liver left lobe too small to characterize but likely cysts largest measuring 8 mm axial image 93 series 2.  There is no new abnormality of the partially imaged upper abdomen.    Bones: There is no acute fracture or suspicious lytic or sclerotic lesion involving the imaged chest wall osseous structures.  There is spondylosis and kyphosis of the imaged spine.  No subluxation.

## 2022-11-17 LAB
ABO + RH BLD: NORMAL
ANION GAP SERPL CALC-SCNC: 14 MMOL/L (ref 8–16)
BASOPHILS # BLD AUTO: 0.02 K/UL (ref 0–0.2)
BASOPHILS # BLD AUTO: 0.03 K/UL (ref 0–0.2)
BASOPHILS NFR BLD: 0.3 % (ref 0–1.9)
BASOPHILS NFR BLD: 0.4 % (ref 0–1.9)
BLD GP AB SCN CELLS X3 SERPL QL: NORMAL
BLD PROD TYP BPU: NORMAL
BLOOD UNIT EXPIRATION DATE: NORMAL
BLOOD UNIT TYPE CODE: 7300
BLOOD UNIT TYPE: NORMAL
BUN SERPL-MCNC: 90 MG/DL (ref 8–23)
CALCIUM SERPL-MCNC: 7.7 MG/DL (ref 8.7–10.5)
CHLORIDE SERPL-SCNC: 97 MMOL/L (ref 95–110)
CO2 SERPL-SCNC: 22 MMOL/L (ref 23–29)
CODING SYSTEM: NORMAL
CREAT SERPL-MCNC: 8.3 MG/DL (ref 0.5–1.4)
DIFFERENTIAL METHOD: ABNORMAL
DIFFERENTIAL METHOD: ABNORMAL
DISPENSE STATUS: NORMAL
EOSINOPHIL # BLD AUTO: 0 K/UL (ref 0–0.5)
EOSINOPHIL # BLD AUTO: 0 K/UL (ref 0–0.5)
EOSINOPHIL NFR BLD: 0 % (ref 0–8)
EOSINOPHIL NFR BLD: 0.4 % (ref 0–8)
ERYTHROCYTE [DISTWIDTH] IN BLOOD BY AUTOMATED COUNT: 14.8 % (ref 11.5–14.5)
ERYTHROCYTE [DISTWIDTH] IN BLOOD BY AUTOMATED COUNT: 15 % (ref 11.5–14.5)
EST. GFR  (NO RACE VARIABLE): 4.6 ML/MIN/1.73 M^2
GLUCOSE SERPL-MCNC: 88 MG/DL (ref 70–110)
HCT VFR BLD AUTO: 20.4 % (ref 37–48.5)
HCT VFR BLD AUTO: 25 % (ref 37–48.5)
HGB BLD-MCNC: 6.5 G/DL (ref 12–16)
HGB BLD-MCNC: 8.5 G/DL (ref 12–16)
IMM GRANULOCYTES # BLD AUTO: 0.13 K/UL (ref 0–0.04)
IMM GRANULOCYTES # BLD AUTO: 0.24 K/UL (ref 0–0.04)
IMM GRANULOCYTES NFR BLD AUTO: 1.9 % (ref 0–0.5)
IMM GRANULOCYTES NFR BLD AUTO: 2.8 % (ref 0–0.5)
LYMPHOCYTES # BLD AUTO: 0.6 K/UL (ref 1–4.8)
LYMPHOCYTES # BLD AUTO: 1 K/UL (ref 1–4.8)
LYMPHOCYTES NFR BLD: 13.7 % (ref 18–48)
LYMPHOCYTES NFR BLD: 7.3 % (ref 18–48)
MAGNESIUM SERPL-MCNC: 2 MG/DL (ref 1.6–2.6)
MCH RBC QN AUTO: 29.1 PG (ref 27–31)
MCH RBC QN AUTO: 30 PG (ref 27–31)
MCHC RBC AUTO-ENTMCNC: 31.9 G/DL (ref 32–36)
MCHC RBC AUTO-ENTMCNC: 34 G/DL (ref 32–36)
MCV RBC AUTO: 88 FL (ref 82–98)
MCV RBC AUTO: 92 FL (ref 82–98)
MONOCYTES # BLD AUTO: 0.1 K/UL (ref 0.3–1)
MONOCYTES # BLD AUTO: 0.4 K/UL (ref 0.3–1)
MONOCYTES NFR BLD: 1.4 % (ref 4–15)
MONOCYTES NFR BLD: 6.1 % (ref 4–15)
NEUTROPHILS # BLD AUTO: 5.4 K/UL (ref 1.8–7.7)
NEUTROPHILS # BLD AUTO: 7.5 K/UL (ref 1.8–7.7)
NEUTROPHILS NFR BLD: 78 % (ref 38–73)
NEUTROPHILS NFR BLD: 87.7 % (ref 38–73)
NRBC BLD-RTO: 0 /100 WBC
NRBC BLD-RTO: 1 /100 WBC
NUM UNITS TRANS PACKED RBC: NORMAL
PHOSPHATE SERPL-MCNC: 6.4 MG/DL (ref 2.7–4.5)
PLATELET # BLD AUTO: 221 K/UL (ref 150–450)
PLATELET # BLD AUTO: 275 K/UL (ref 150–450)
PMV BLD AUTO: 11.4 FL (ref 9.2–12.9)
PMV BLD AUTO: 11.5 FL (ref 9.2–12.9)
POCT GLUCOSE: 111 MG/DL (ref 70–110)
POCT GLUCOSE: 132 MG/DL (ref 70–110)
POCT GLUCOSE: 159 MG/DL (ref 70–110)
POCT GLUCOSE: 174 MG/DL (ref 70–110)
POCT GLUCOSE: 202 MG/DL (ref 70–110)
POCT GLUCOSE: 96 MG/DL (ref 70–110)
POTASSIUM SERPL-SCNC: 4.7 MMOL/L (ref 3.5–5.1)
RBC # BLD AUTO: 2.23 M/UL (ref 4–5.4)
RBC # BLD AUTO: 2.83 M/UL (ref 4–5.4)
SODIUM SERPL-SCNC: 133 MMOL/L (ref 136–145)
WBC # BLD AUTO: 6.93 K/UL (ref 3.9–12.7)
WBC # BLD AUTO: 8.51 K/UL (ref 3.9–12.7)

## 2022-11-17 PROCEDURE — 92526 ORAL FUNCTION THERAPY: CPT

## 2022-11-17 PROCEDURE — 86920 COMPATIBILITY TEST SPIN: CPT | Performed by: HOSPITALIST

## 2022-11-17 PROCEDURE — 25000003 PHARM REV CODE 250: Performed by: INTERNAL MEDICINE

## 2022-11-17 PROCEDURE — 85025 COMPLETE CBC W/AUTO DIFF WBC: CPT | Mod: 91 | Performed by: HOSPITALIST

## 2022-11-17 PROCEDURE — 80048 BASIC METABOLIC PNL TOTAL CA: CPT | Performed by: STUDENT IN AN ORGANIZED HEALTH CARE EDUCATION/TRAINING PROGRAM

## 2022-11-17 PROCEDURE — 84100 ASSAY OF PHOSPHORUS: CPT

## 2022-11-17 PROCEDURE — C9113 INJ PANTOPRAZOLE SODIUM, VIA: HCPCS | Performed by: STUDENT IN AN ORGANIZED HEALTH CARE EDUCATION/TRAINING PROGRAM

## 2022-11-17 PROCEDURE — 36415 COLL VENOUS BLD VENIPUNCTURE: CPT | Performed by: HOSPITALIST

## 2022-11-17 PROCEDURE — 25000003 PHARM REV CODE 250: Performed by: HOSPITALIST

## 2022-11-17 PROCEDURE — 86850 RBC ANTIBODY SCREEN: CPT | Performed by: HOSPITALIST

## 2022-11-17 PROCEDURE — 63600175 PHARM REV CODE 636 W HCPCS: Performed by: STUDENT IN AN ORGANIZED HEALTH CARE EDUCATION/TRAINING PROGRAM

## 2022-11-17 PROCEDURE — 25000003 PHARM REV CODE 250: Performed by: STUDENT IN AN ORGANIZED HEALTH CARE EDUCATION/TRAINING PROGRAM

## 2022-11-17 PROCEDURE — 63600175 PHARM REV CODE 636 W HCPCS: Performed by: INTERNAL MEDICINE

## 2022-11-17 PROCEDURE — 94761 N-INVAS EAR/PLS OXIMETRY MLT: CPT

## 2022-11-17 PROCEDURE — 99232 SBSQ HOSP IP/OBS MODERATE 35: CPT | Mod: ,,, | Performed by: HOSPITALIST

## 2022-11-17 PROCEDURE — 83735 ASSAY OF MAGNESIUM: CPT

## 2022-11-17 PROCEDURE — P9016 RBC LEUKOCYTES REDUCED: HCPCS | Performed by: HOSPITALIST

## 2022-11-17 PROCEDURE — 97530 THERAPEUTIC ACTIVITIES: CPT

## 2022-11-17 PROCEDURE — 99232 PR SUBSEQUENT HOSPITAL CARE,LEVL II: ICD-10-PCS | Mod: ,,, | Performed by: HOSPITALIST

## 2022-11-17 PROCEDURE — 85025 COMPLETE CBC W/AUTO DIFF WBC: CPT | Performed by: STUDENT IN AN ORGANIZED HEALTH CARE EDUCATION/TRAINING PROGRAM

## 2022-11-17 PROCEDURE — 97110 THERAPEUTIC EXERCISES: CPT

## 2022-11-17 PROCEDURE — 63600175 PHARM REV CODE 636 W HCPCS: Performed by: HOSPITALIST

## 2022-11-17 PROCEDURE — 20600001 HC STEP DOWN PRIVATE ROOM

## 2022-11-17 PROCEDURE — 25000003 PHARM REV CODE 250

## 2022-11-17 PROCEDURE — 99233 PR SUBSEQUENT HOSPITAL CARE,LEVL III: ICD-10-PCS | Mod: ,,, | Performed by: HOSPITALIST

## 2022-11-17 PROCEDURE — 99233 SBSQ HOSP IP/OBS HIGH 50: CPT | Mod: ,,, | Performed by: HOSPITALIST

## 2022-11-17 RX ORDER — METHYLPREDNISOLONE SOD SUCC 125 MG
100 VIAL (EA) INJECTION
Status: DISCONTINUED | OUTPATIENT
Start: 2022-11-17 | End: 2022-11-17

## 2022-11-17 RX ORDER — FAMOTIDINE 10 MG/ML
20 INJECTION INTRAVENOUS
Status: COMPLETED | OUTPATIENT
Start: 2022-11-17 | End: 2022-11-17

## 2022-11-17 RX ORDER — ACETAMINOPHEN 325 MG/1
650 TABLET ORAL
Status: COMPLETED | OUTPATIENT
Start: 2022-11-17 | End: 2022-11-17

## 2022-11-17 RX ORDER — MEPERIDINE HYDROCHLORIDE 50 MG/ML
25 INJECTION INTRAMUSCULAR; INTRAVENOUS; SUBCUTANEOUS
Status: CANCELLED | OUTPATIENT
Start: 2022-11-24

## 2022-11-17 RX ORDER — MEPERIDINE HYDROCHLORIDE 50 MG/ML
25 INJECTION INTRAMUSCULAR; INTRAVENOUS; SUBCUTANEOUS
Status: DISPENSED | OUTPATIENT
Start: 2022-11-17 | End: 2022-11-18

## 2022-11-17 RX ORDER — ACETAMINOPHEN 325 MG/1
650 TABLET ORAL
Status: CANCELLED | OUTPATIENT
Start: 2022-11-24

## 2022-11-17 RX ORDER — PREDNISONE 5 MG/1
10 TABLET ORAL DAILY
Status: DISCONTINUED | OUTPATIENT
Start: 2022-12-18 | End: 2022-12-04

## 2022-11-17 RX ORDER — ACETAMINOPHEN 325 MG/1
650 TABLET ORAL
Status: DISCONTINUED | OUTPATIENT
Start: 2022-11-17 | End: 2022-11-17

## 2022-11-17 RX ORDER — FAMOTIDINE 10 MG/ML
20 INJECTION INTRAVENOUS
Status: CANCELLED | OUTPATIENT
Start: 2022-11-24

## 2022-11-17 RX ORDER — METHYLPREDNISOLONE SOD SUCC 125 MG
100 VIAL (EA) INJECTION ONCE
Status: COMPLETED | OUTPATIENT
Start: 2022-11-17 | End: 2022-11-17

## 2022-11-17 RX ORDER — SODIUM CHLORIDE 0.9 % (FLUSH) 0.9 %
10 SYRINGE (ML) INJECTION
Status: DISCONTINUED | OUTPATIENT
Start: 2022-11-17 | End: 2022-12-13 | Stop reason: HOSPADM

## 2022-11-17 RX ORDER — SODIUM CHLORIDE 0.9 % (FLUSH) 0.9 %
10 SYRINGE (ML) INJECTION
Status: CANCELLED | OUTPATIENT
Start: 2022-11-24

## 2022-11-17 RX ORDER — PREDNISONE 20 MG/1
20 TABLET ORAL DAILY
Status: COMPLETED | OUTPATIENT
Start: 2022-11-20 | End: 2022-12-03

## 2022-11-17 RX ORDER — HYDROCODONE BITARTRATE AND ACETAMINOPHEN 500; 5 MG/1; MG/1
TABLET ORAL
Status: DISCONTINUED | OUTPATIENT
Start: 2022-11-17 | End: 2022-12-03

## 2022-11-17 RX ORDER — HEPARIN 100 UNIT/ML
500 SYRINGE INTRAVENOUS
Status: DISCONTINUED | OUTPATIENT
Start: 2022-11-17 | End: 2022-12-13 | Stop reason: HOSPADM

## 2022-11-17 RX ORDER — PREDNISONE 5 MG/1
5 TABLET ORAL DAILY
Status: DISCONTINUED | OUTPATIENT
Start: 2023-01-01 | End: 2022-12-04

## 2022-11-17 RX ORDER — HEPARIN 100 UNIT/ML
500 SYRINGE INTRAVENOUS
Status: CANCELLED | OUTPATIENT
Start: 2022-11-24

## 2022-11-17 RX ADMIN — SODIUM CHLORIDE: 0.9 INJECTION, SOLUTION INTRAVENOUS at 04:11

## 2022-11-17 RX ADMIN — PANTOPRAZOLE SODIUM 40 MG: 40 INJECTION, POWDER, FOR SOLUTION INTRAVENOUS at 08:11

## 2022-11-17 RX ADMIN — NYSTATIN 500000 UNITS: 500000 SUSPENSION ORAL at 09:11

## 2022-11-17 RX ADMIN — CARVEDILOL 25 MG: 25 TABLET, FILM COATED ORAL at 07:11

## 2022-11-17 RX ADMIN — HYDRALAZINE HYDROCHLORIDE 100 MG: 50 TABLET ORAL at 12:11

## 2022-11-17 RX ADMIN — LEVOTHYROXINE SODIUM 25 MCG: 25 TABLET ORAL at 05:11

## 2022-11-17 RX ADMIN — SODIUM CHLORIDE 2000 MG: 1 TABLET ORAL at 12:11

## 2022-11-17 RX ADMIN — METHYLPREDNISOLONE SODIUM SUCCINATE 20 MG: 40 INJECTION, POWDER, FOR SOLUTION INTRAMUSCULAR; INTRAVENOUS at 07:11

## 2022-11-17 RX ADMIN — QUETIAPINE FUMARATE 25 MG: 25 TABLET ORAL at 08:11

## 2022-11-17 RX ADMIN — SODIUM CHLORIDE 2000 MG: 1 TABLET ORAL at 08:11

## 2022-11-17 RX ADMIN — LISINOPRIL 40 MG: 20 TABLET ORAL at 07:11

## 2022-11-17 RX ADMIN — HYDRALAZINE HYDROCHLORIDE 100 MG: 50 TABLET ORAL at 10:11

## 2022-11-17 RX ADMIN — AMLODIPINE BESYLATE 10 MG: 10 TABLET ORAL at 07:11

## 2022-11-17 RX ADMIN — PREDNISONE 25 MG: 5 TABLET ORAL at 12:11

## 2022-11-17 RX ADMIN — NYSTATIN 500000 UNITS: 500000 SUSPENSION ORAL at 01:11

## 2022-11-17 RX ADMIN — SODIUM CHLORIDE 2000 MG: 1 TABLET ORAL at 07:11

## 2022-11-17 RX ADMIN — FAMOTIDINE 20 MG: 10 INJECTION INTRAVENOUS at 04:11

## 2022-11-17 RX ADMIN — METHYLPREDNISOLONE SODIUM SUCCINATE 100 MG: 125 INJECTION, POWDER, FOR SOLUTION INTRAMUSCULAR; INTRAVENOUS at 04:11

## 2022-11-17 RX ADMIN — INSULIN ASPART 2 UNITS: 100 INJECTION, SOLUTION INTRAVENOUS; SUBCUTANEOUS at 01:11

## 2022-11-17 RX ADMIN — DIPHENHYDRAMINE HYDROCHLORIDE 25 MG: 50 INJECTION, SOLUTION INTRAMUSCULAR; INTRAVENOUS at 04:11

## 2022-11-17 RX ADMIN — CARVEDILOL 25 MG: 25 TABLET, FILM COATED ORAL at 08:11

## 2022-11-17 RX ADMIN — RITUXIMAB 589 MG: 10 INJECTION, SOLUTION INTRAVENOUS at 04:11

## 2022-11-17 RX ADMIN — PANTOPRAZOLE SODIUM 40 MG: 40 INJECTION, POWDER, FOR SOLUTION INTRAVENOUS at 07:11

## 2022-11-17 RX ADMIN — ACETAMINOPHEN 650 MG: 325 TABLET ORAL at 04:11

## 2022-11-17 RX ADMIN — HYDRALAZINE HYDROCHLORIDE 100 MG: 50 TABLET ORAL at 05:11

## 2022-11-17 RX ADMIN — ATOVAQUONE 1500 MG: 750 SUSPENSION ORAL at 07:11

## 2022-11-17 RX ADMIN — NYSTATIN 500000 UNITS: 500000 SUSPENSION ORAL at 07:11

## 2022-11-17 NOTE — PLAN OF CARE
HD- chair is set- up for [facility] - [phone]; fax [phone]. Patient still awaits an accepting facility. [facility] will follow.    2:43 PM  [facility] contact SW concerning discharge. SW informed facility patient is not medically ready for discharge. [facility] will need updated information upon discharge faxed to them.            Irene Ribera LMSW  PRN - [facility]  [phone]

## 2022-11-17 NOTE — ASSESSMENT & PLAN NOTE
Multifocal pneumonia  Hemoptysis  Dyspnea  Diffuse Alveolar Hemorrahge  In the setting of a new diagnosis of microscopic polyangiitis, presented with fevers, dyspnea,.  - Chest imaging was consistent with diffuse alveolar hemorrhage.  CT chest wc 10/17 with bl perihilar dense consolidations w/ peripheral patcy areas of GGO, BL PNA, less c/f cardiogenic pulmonary edema.  - Patient upgraded to Medical ICU 10/23/22 with abrupt respiratory decline requiring NIPPV, improved with high dose IV steroids, plasmapheresis, emergent hemodialysis.   - Unable to perform bronchoscopy in the Medical ICU due to tenuous respiratory status  - As of 11/6, hypoxemia has significantly improved    Plan:  - weaned to room air  - continue management of underlying triggers with steroid and immunosuppressants  - incentive spirometry and respiratory hygiene  11/17 sats 92% on RA

## 2022-11-17 NOTE — PT/OT/SLP PROGRESS
Occupational Therapy Treatment    Patient Name:  Kristin Goodman   MRN:  [id number]  Admit Date: 10/17/2022  Admitting Diagnosis:  Microscopic polyangiitis   Length of Stay: 31 days  Recent Surgery: Procedure(s) (LRB):  EGD (ESOPHAGOGASTRODUODENOSCOPY) (N/A) 3 Days Post-Op    Recommendations:     Discharge Recommendations: nursing facility, skilled  Discharge Equipment Recommendations:  bedside commode  Barriers to discharge:  None    Plan:     Patient to be seen 3 x/week to address the above listed problems via therapeutic activities, therapeutic exercises, self-care/home management  Plan of Care Expires: 11/20/22  Plan of Care Reviewed with: patient, family    Assessment:   Kristin Goodman is a 77 y.o. female with a medical diagnosis of Microscopic polyangiitis.  She presents with the following performance deficits affecting function: weakness, impaired endurance, impaired self care skills, impaired functional mobility, gait instability, impaired balance, pain, decreased safety awareness, decreased lower extremity function, decreased upper extremity function, impaired coordination, impaired cardiopulmonary response to activity.      Pt tolerated session fairly this date and was willing to participate with increased family encouragement. Demonstrating continued increased weakness, increased fatigue, decreased activity tolerance, impaired endurance, impaired balance and decreased safety awareness, requiring increased time and assistance to complete functional tasks. Patient completed bed mobility and completed BUE therex seated EOB. Patient required increased extended rest breaks due to increased fatigue. Patient completed sit>stand transfer and left lateral steps using RW. Patient receiving blood during session, BP monitored and maintained within normal limits. Patient is progressing towards established goals, and continues to benefit from acute skilled OT services to increase functional performance and improve quality  "of life. OT to continue to recommend SNF at discharge to improve pt functional independence and increase patient safety before returning home.      Rehab Prognosis: Fair; patient would benefit from acute skilled OT services to address these deficits and reach maximum level of function.        Subjective   Communicated with: Nurse prior to session.  Patient found HOB elevated with telemetry, blood pressure cuff, peripheral IV, Trialysis upon OT entry to room. Pt agreeable to participate at this time.    Patient: " Let me try to do it" -re sit>supine    Pain/Comfort:  Pain Rating 1: other (see comments) (patient did not rate)  Location - Side 1: Left  Location - Orientation 1: generalized  Location 1: arm (at Midline site from blood)  Pain Addressed 1: Distraction, Cessation of Activity  Pain Rating Post-Intervention 1: other (see comments) (patient did not rate)    Objective:   Patient found with: telemetry, blood pressure cuff, peripheral IV, Trialysis   General Precautions: Standard, Cardiac fall   Orthopedic Precautions:N/A   Braces: N/A   Oxygen Device: Nasal Cannula 2L  Vitals: BP (!) 145/70   Pulse 70   Temp 98 °F (36.7 °C) (Oral)   Resp 19   Ht 5' 1" (1.549 m)   Wt 57.6 kg (127 lb)   SpO2 (!) 94%   Breastfeeding No   BMI 24.00 kg/m²     Outcome Measures:  [facility] 6 Click ADL: 16    Cognition:   Alert and Cooperative  Command following: follows one-step commands  Communication: clear/fluent    Occupational Performance:  Bed Mobility:    Patient completed Rolling/Turning to Left with  minimum assistance  Patient completed Supine to Sit with moderate assistance on L side of bed  Scooting anteriorly to EOB to have both feet planted on floor: contact guard assistance and minimum assistance  Patient completed Sit to Supine with contact guard assistance on L side of bed  Scooting to HOB in supine: dependent and drawsheet pull    Functional Mobility/Transfers:  Static Sitting EOB: SBA  Patient completed Sit <> " Stand Transfer from EOB with moderate assistance  with  rolling walker   Static Standing Balance: SBA  Patient completed functional mobility of left lateral steps along EOB x 5 with Min A using RW.      Activities of Daily Living:  Declined needs at this time    AMPAC 6 Click ADL:  AMPA Total Score: 16    Treatment & Education:  -Pt and family education on OT role and POC.  -Importance of E/OOB activity with staff assistance, emphasis on daily participation  -BUE therex x 10 each seated EOB: chest press, shoulder flexion and cross body punch, cueing for full ROM  -Safety during functional transfer and mobility ensured  -Patient provided with education on importance of Bilateral UB/LB integration during functional tasks for improvement in functional performance.   -Education provided/reviewed, questions answered within OT scope of practice.   -Patient demonstrates understanding and learning this date.         Patient left HOB elevated with all lines intact, call button in reach, and sister present    GOALS:   Multidisciplinary Problems       Occupational Therapy Goals          Problem: Occupational Therapy    Goal Priority Disciplines Outcome Interventions   Occupational Therapy Goal     OT, PT/OT Ongoing, Progressing    Description: Goals to be met by: 11/15     Patient will increase functional independence with ADLs by performing:    UE Dressing with Modified [place].  LE Dressing with Modified [place].  Grooming while standing with Modified [place].  Toileting from toilet with Modified [place] for hygiene and clothing management.   Supine to sit with Modified [place].  Step transfer with Modified [place]  Toilet transfer to toilet with Modified [place].                         Time Tracking:     OT Date of Treatment: 11/17/22  OT Start Time: 1437  OT Stop Time: 1515  OT Total Time (min): 38 min    Billable Minutes:Therapeutic Activity 13  Therapeutic Exercise  25      11/17/2022

## 2022-11-17 NOTE — ASSESSMENT & PLAN NOTE
As of 10/26/22 strongly positive MPO Ab (in contrast to borderline positive PR3), consistent with clinical picture of microscopic polyangiitis with pulmonary, and renal involvement after presenting with acute hypoxemic respiratory failure, hemoptysis, and acute renal failure.  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   3)  CHANGES SUGGESTIVE OF FOCAL ACUTE PYELONEPHRITIS.   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 Now following PEXIVAS steroid taper. Currently on IV Solumedrol 20 mg daily.   - plan for 20mg IV daily on 11/6 for 14 days (last dose of 20mg equivalent is 11/20/2022).   - apheresis: underwent PLEX 10/26, 10/27, 10/30, 11/1, 11/2, 11/3  - rituximab every 7 days for 4 doses: Dose #1: 10/27, #2 11/3, #3 11/10; Next Rituximab 375 mg/m^2 due Nov 17th   - cyclophosphamide every 14 days for 2 doses: 10/27, 11/10  - Opportunistic Infection ppx: atovaquone 1500mg PO daily  - GI bleed prophylaxis: pantoprazole 40mg daily   11/17 Rheumatology following for steroid dosing and chemotherapeutic agents. on IV steroids due to p.o. intake issues, however can transition to p.o. when swallowing issues  reliably resolved - on steroid taper - solumedrol 20 mg qd until 11/20, then solumedrol  15mg  x 14 days, then solumedrol 12.5mg x  14 days, then 10 for 14 days, then 7.5 for 14 days then possibly life long 5 mg.  rituximab dose due November 17

## 2022-11-17 NOTE — ASSESSMENT & PLAN NOTE
"Patient with baseline creatinine of 1 as recent as August 2022 with progressive worsening beginning in early October 1.3 (10/13)->1.6 (10/17)->3.0 (10/23) despite IV fluids.  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  Patient noted to have new onset proteinuria and hematuria over the last 1 month.  Additionally, would consider ATN in the setting of suspected sepsis from multifocal pneumonia.      Concerns for glomerulonephritis given patient's current clinical picture. Initial urine microscopy demonstrating muddy brown casts with likely acanthocytes noted as well. MITUL positive, proteinase 3 ab weakly positive, MPO strongly positive. Patient s/p high-dose IV solumedrol x3 doses, now on Solumedrol 50mg qd. Underwent kidney bx 10/27. Rheumatology consulted, and patient started on Plex, Ritux/cytoxan. Given continually worsening renal function and uremic encephalopathy, patient underwent SLED without complication on 10/27. Transferred to floor on 10/29. Significantly improved mentation on 10/31. Underwent HD 11/1. Patient also with increasing hypernatremia so added FWF to tube feeds.      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 Second dose Ritux on 11/3. Next dose of cytoxan on 11/10. Will discuss with Rheum regarding maintenance dosing.      Recommendations:  - Signs of renal recovery 11/16   -reported at least 200 cc by patient UOP  - No need for HD today, will continue to monitor  - Continue 2 grams of sodium chloride tablets TID for likely SIADH  - continue solumedrol 20 mg qd until 11/20, then 15 for 14 days, then 12.5 for 14 days, then 10 for 14 days, then 7.5 for 14 days then possibly life long 5 mg. (per PEXIVAS trial)  - strict intake and output  - renally dose medications " and avoid nephrotoxins when possible  - replete electrolytes as appropriate

## 2022-11-17 NOTE — ASSESSMENT & PLAN NOTE
Nutrition consulted. Most recent weight and BMI monitored-          Malnutrition (Moderate to Severe)  Weight Loss (Malnutrition): 7.5% in 3 months              Measurements:  Wt Readings from Last 1 Encounters:   11/14/22 57.6 kg (127 lb)   Body mass index is 24 kg/m².    Recommendations: Recommendation/Intervention: 1. As tolerated, increase TF rate (of Novasource) to 35 mL/hr  - 2. RD to monitor & follow-up.  Goals: Meet % EEN, EPN by RD f/u date    Patient has been screened and assessed by RD. RD will follow patient.

## 2022-11-17 NOTE — PROGRESS NOTES
[facility] ([place] [address])  Nephrology  Progress Note    Patient Name: Kristin Goodman  MRN: [id number]  Admission Date: 10/17/2022  Hospital Length of Stay: 31 days  Attending Provider: West Quinn MD   Primary Care Physician: Lori Hernandez MD  Principal Problem:Microscopic polyangiitis    Subjective:     HPI: Kristin Goodman is a 77 year old female with hypertension, hypothyroidism, HFpEF who presents for cough, shortness of breath, and weakness.  Patient initially presented to ER on 09/24 with hemoptysis and imaging concerning for multilobar pneumonia at which time she was discharged on a 10 day course of levofloxacin.  Patient initially had some improvement but began having worsening symptoms on 10/14.  Patient describes multiple fevers and progressive shortness of breath.  She continues to have intermittent hemoptysis.  Patient with worsening leukocytosis and limited clinical improvement despite broad-spectrum antibiotics.  She remains on cefepime.  Patient is a noted to have baseline creatinine of 1 as recent as 8/2022 but progressive worsening of creatinine in early October.  On admission patient with creatinine of 1.6 (10/17) with subsequent progression and creatinine of 3.0 at time of consultation (10/23).  Nephrology consulted for acute kidney injury.      Interval History: BUN decreased by 1, reported 200 cc UOP by patient, none recorded    Review of patient's allergies indicates:   Allergen Reactions    Ampicillin     Peaches [peach (prunus persica)] Other (See Comments)     Pt unable to state type of reaction. Information obtained from daughter who states she was informed of allergy from patient.    Penicillins      Other reaction(s): Hives    Sulfa (sulfonamide antibiotics) Rash and Hives     Current Facility-Administered Medications   Medication Frequency    0.9%  NaCl infusion (for blood administration) Q24H PRN    0.9%  NaCl infusion Once    acetaminophen tablet 650 mg Q4H PRN     albuterol-ipratropium 2.5 mg-0.5 mg/3 mL nebulizer solution 3 mL Q4H PRN    aluminum-magnesium hydroxide-simethicone 200-200-20 mg/5 mL suspension 30 mL QID PRN    aluminum-magnesium hydroxide-simethicone 200-200-20 mg/5 mL suspension 30 mL Once    And    LIDOcaine HCl 2% oral solution 15 mL Once    amLODIPine tablet 10 mg Daily    atovaquone 750 mg/5 mL oral liquid 1,500 mg Daily    bisacodyL suppository 10 mg Daily PRN    carvediloL tablet 25 mg BID    cloNIDine tablet 0.1 mg Q6H PRN    dextrose 10% bolus 125 mL PRN    dextrose 10% bolus 250 mL PRN    glucagon (human recombinant) injection 1 mg PRN    glucose chewable tablet 16 g PRN    glucose chewable tablet 24 g PRN    heparin (porcine) injection 1,000 Units PRN    heparin, porcine (PF) 100 unit/mL injection flush 500 Units PRN    hydrALAZINE tablet 100 mg Q8H    insulin aspart U-100 pen 1-10 Units Q6H PRN    levothyroxine tablet 25 mcg Before breakfast    lisinopriL tablet 40 mg Daily    melatonin tablet 6 mg Nightly PRN    methocarbamoL tablet 500 mg TID PRN    methylPREDNISolone sodium succinate injection 20 mg Daily    naloxone 0.4 mg/mL injection 0.02 mg PRN    nystatin 100,000 unit/mL suspension 500,000 Units QID    ondansetron injection 4 mg Q8H PRN    pantoprazole injection 40 mg Q12H    prochlorperazine injection Soln 5 mg Q6H PRN    QUEtiapine tablet 25 mg QHS    simethicone chewable tablet 80 mg QID PRN    sodium chloride 0.9% 250 mL flush bag [facility]    sodium chloride 0.9% flush 10 mL PRN    sodium chloride 0.9% flush 10 mL PRN    sodium chloride 0.9% flush 10 mL PRN    sodium chloride 0.9% flush 5 mL PRN    sodium chloride tablet tbso 2,000 mg TID     Facility-Administered Medications Ordered in Other Encounters   Medication Frequency    celecoxib capsule 400 mg Once    fentaNYL 50 mcg/mL injection  mcg PRN    LIDOcaine (PF) 10 mg/ml (1%) injection 10 mg Once PRN    LIDOcaine (PF) 10 mg/ml (1%) injection 10 mg Once    midazolam  (VERSED) 1 mg/mL injection 0.5-4 mg PRN    ropivacaine 0.2% Nimbus PainPRO Pump infusion 500 ML Continuous       Objective:     Vital Signs (Most Recent):  Temp: 98 °F (36.7 °C) (11/17/22 0705)  Pulse: 70 (11/17/22 0705)  Resp: 18 (11/17/22 0705)  BP: 132/63 (11/17/22 0705)  SpO2: (!) 92 % (11/17/22 0705)  O2 Device (Oxygen Therapy): room air (11/17/22 0705)   Vital Signs (24h Range):  Temp:  [97.8 °F (36.6 °C)-98.5 °F (36.9 °C)] 98 °F (36.7 °C)  Pulse:  [70-78] 70  Resp:  [18-20] 18  SpO2:  [92 %-100 %] 92 %  BP: (115-153)/(56-63) 132/63     Weight: 57.6 kg (127 lb) (11/14/22 0958)  Body mass index is 24 kg/m².  Body surface area is 1.57 meters squared.    I/O last 3 completed shifts:  In: 120 [P.O.:120]  Out: -     Physical Exam  Vitals and nursing note reviewed.   Constitutional:       General: She is awake.      Appearance: She is well-developed. She is ill-appearing. She is not toxic-appearing or diaphoretic.   HENT:      Head: Normocephalic and atraumatic.      Right Ear: External ear normal.      Left Ear: External ear normal.      Nose:      Comments: + NGT     Mouth/Throat:      Mouth: Mucous membranes are dry.   Eyes:      General: Lids are normal. No scleral icterus.        Right eye: No discharge.         Left eye: No discharge.   Cardiovascular:      Rate and Rhythm: Normal rate and regular rhythm.   Pulmonary:      Effort: Pulmonary effort is normal.      Breath sounds: Normal breath sounds. No wheezing or rhonchi.   Abdominal:      General: Bowel sounds are normal.      Palpations: Abdomen is soft.      Tenderness: There is no abdominal tenderness.   Musculoskeletal:         General: No deformity.      Cervical back: Normal range of motion and neck supple. No rigidity or tenderness.      Right lower leg: No edema.      Left lower leg: No edema.   Skin:     General: Skin is warm and dry.   Neurological:      General: No focal deficit present.      Mental Status: She is alert and oriented to person,  "place, and time. Mental status is at baseline.   Psychiatric:         Attention and Perception: Attention normal.         Behavior: Behavior normal.       Significant Labs:  All labs within the past 24 hours have been reviewed.     Significant Imaging:  Labs: Reviewed    Assessment/Plan:     Encounter for continuous renal replacement therapy (CRRT) for acute renal failure  Patient with baseline creatinine of 1 as recent as August 2022 with progressive worsening beginning in early October 1.3 (10/13)->1.6 (10/17)->3.0 (10/23) despite IV fluids.  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  Patient noted to have new onset proteinuria and hematuria over the last 1 month.  Additionally, would consider ATN in the setting of suspected sepsis from multifocal pneumonia.      Concerns for glomerulonephritis given patient's current clinical picture. Initial urine microscopy demonstrating muddy brown casts with likely acanthocytes noted as well. MITUL positive, proteinase 3 ab weakly positive, MPO strongly positive. Patient s/p high-dose IV solumedrol x3 doses, now on Solumedrol 50mg qd. Underwent kidney bx 10/27. Rheumatology consulted, and patient started on Plex, Ritux/cytoxan. Given continually worsening renal function and uremic encephalopathy, patient underwent SLED without complication on 10/27. Transferred to floor on 10/29. Significantly improved mentation on 10/31. Underwent HD 11/1. Patient also with increasing hypernatremia so added FWF to tube feeds.      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 Second dose Ritux on 11/3. Next dose of cytoxan on 11/10. Will discuss with Rheum regarding maintenance dosing.      Recommendations:  - Signs of renal recovery 11/16   -reported at least 200 " cc by patient UOP  - No need for HD today, will continue to monitor  - Continue 2 grams of sodium chloride tablets TID for likely SIADH  - Changed to PO pred25 mg qd until 11/20, then 20 for 14 days, then 12.5 for 14 days, then 10 for 14 days, then 7.5 for 14 days then possibly life long 5 mg. (per PEXIVAS trial)  - strict intake and output  - renally dose medications and avoid nephrotoxins when possible  - replete electrolytes as appropriate        Thank you for your consult. I will follow-up with patient. Please contact us if you have any additional questions.    Blue Puente MD  Nephrology  [facility] ([place]

## 2022-11-17 NOTE — ASSESSMENT & PLAN NOTE
Due to microscopic polyangiitis. Remains on hemodialysis intermittently.   - Baseline creatinine 1.0-1.2.   - New onset proteinuria/hematuria.   - Renal biopsy completed and   - Trialysis in place for intermittent Hemodialysis    Plan  - Nephrology consulted, appreciate management  - management of MPA as noted separately  - Renal function panel daily  - renally dose all medications  - intermittent Hemodialysis as indicated, per Nephrology   11/17     Recent Labs   Lab 11/15/22  0447 11/16/22  0513 11/17/22  0234   BUN 84* 91* 90*   CREATININE 6.5* 7.5* 8.3*     . Nephrology following for HD and  monitoring for renal recovery with some  improvement in UOP in last 2 days. will need PermCath and HD chair set up if no renal  recovery.  likely SNF pending nephro decision regarding HD

## 2022-11-17 NOTE — ASSESSMENT & PLAN NOTE
11/17 Nephrology following for HD and  monitoring for renal recovery with some  improvement in UOP in last 2 days. will need PermCath and HD chair set up if no renal  recovery.

## 2022-11-17 NOTE — ASSESSMENT & PLAN NOTE
Has hyponatremia,started on NS.  11/17   - Patient with sodium Recent Labs   Lab 11/15/22  0447 11/16/22  0513 11/17/22  0234   * 129* 133*   . sodium trending up  . Continue 2 grams of sodium chloride tablets TID for likely SIADH

## 2022-11-17 NOTE — PROGRESS NOTES
[facility] ([facility]  Adult Nutrition  Progress Note    SUMMARY       Recommendations  Continue dysphagia, mechanical soft, low sodium/renal diet (texture per SLP)- encourage adequate intake as needed.  If TF recs warranted rec'd TF rate of Novasource to 35 mL/hr - 1680 kcals, 76 g of protein, 602 mL fluid.   RD following       Goals: Meet % EEN, EPN by RD f/u date  Nutrition Goal Status: progressing towards goal  Communication of RD Recs: POC    Assessment and Plan    Moderate malnutrition  Nutrition Problem:  Moderate Protein-Calorie Malnutrition  Malnutrition in the context of Acute Illness/Injury    Related to (etiology):  Inability to consume sufficient energy    Signs and Symptoms (as evidenced by):  Body Fat Depletion: moderate depletion of orbitals and triceps   Muscle Mass Depletion: moderate depletion of temples and clavicle region   Weight Loss: 7.5% x 3 months  Fluid Accumulation: mild    Interventions(treatment strategy):  Collaboration of nutrition care w/ other providers  EN    Nutrition Diagnosis Status:  Continues     Malnutrition Assessment     Weight Loss (Malnutrition): 7.5% in 3 months   Orbital Region (Subcutaneous Fat Loss): moderate depletion  Upper Arm Region (Subcutaneous Fat Loss): moderate depletion   [place] Region (Muscle Loss): moderate depletion  Clavicle Bone Region (Muscle Loss): moderate depletion   Edema (Fluid Accumulation): 2-->mild             Reason for Assessment    Reason For Assessment: RD follow-up  Diagnosis: other (see comments) (Polyangiittis)  Relevant Medical History: HTN, HF  Interdisciplinary Rounds: did not attend  General Information Comments: Spoke w/ pt at bedside, reports a good appetite at this time. Pt is currently on a dysphagia mechanical soft, low sodium, renal diet per Speech recommendations- previously on TF. Pt is tolerating  50% meal consumption sometimes. Pt denies any issues w/ chewing/swallowing. No n/v/d/c. Pt continues to  "meets criteria for moderate malnutrition. LBM noted-11/15/22  Nutrition Discharge Planning: Pending clinical course    Nutrition Risk Screen    Nutrition Risk Screen: dysphagia or difficulty swallowing    Nutrition/Diet History    Spiritual, Cultural Beliefs, Anabaptism Practices, Values that Affect Care: no  Food Allergies: other (see comments) (Peaches)  Factors Affecting Nutritional Intake: NPO    Anthropometrics    Temp: 97.9 °F (36.6 °C)  Height Method: Stated  Height: 5' 1" (154.9 cm)  Height (inches): 61 in  Weight Method: Stated  Weight: 57.6 kg (127 lb)  Weight (lb): 127 lb  Ideal Body Weight (IBW), Female: 105 lb  % Ideal Body Weight, Female (lb): 120.95 %  BMI (Calculated): 24  BMI Grade: 25 - 29.9 - overweight  Usual Body Weight (UBW), k.7 kg  % Usual Body Weight: 92.62  % Weight Change From Usual Weight: -7.57 %       Lab/Procedures/Meds    Pertinent Labs Reviewed: reviewed  Pertinent Labs Comments: Sodium 133, GFR 4.6, Cr 8.3, BUN 90, Phosphorus 6.4  Pertinent Medications Reviewed: reviewed  Pertinent Medications Comments: -      Estimated/Assessed Needs    Weight Used For Calorie Calculations: 63 kg (138 lb 14.2 oz)  Energy Calorie Requirements (kcal): 5911-9591 kcal/d  Energy Need Method: Kcal/kg (25-30 kcal/kg)  Protein Requirements: 76-88 g/d (1.2-1.4 g/kg)  Weight Used For Protein Calculations: 63 kg (138 lb 14.2 oz)     Estimated Fluid Requirement Method: other (see comments) (Per MD or 1 mL/kcal)  RDA Method (mL): 1575       Nutrition Prescription Ordered    Current Diet Order: Dysphagia mechanical soft, renal/low sodium diet    Evaluation of Received Nutrient/Fluid Intake    Enteral Calories (kcal): 960  Enteral Protein (gm): 44  Enteral (Free Water) Fluid (mL): 344  Free Water Flush Fluid (mL): 1600  % Kcal Needs: 61%  % Protein Needs: 58%  I/O: +5.7 L since 11/3/22  Energy Calories Required: not meeting needs  Protein Required: not meeting needs  Fluid Required: other (see comments) (Per " MD)  Comments: LBM: 11/7  Tolerance: tolerating  % Intake of Estimated Energy Needs: 25 - 50 %  % Meal Intake: 25 - 50 %    Nutrition Risk    Level of Risk/Frequency of Follow-up:  (1x/week)     Monitor and Evaluation    Food and Nutrient Intake: energy intake, food and beverage intake, enteral nutrition intake  Food and Nutrient Adminstration: diet order, enteral and parenteral nutrition administration  Physical Activity and Function: nutrition-related ADLs and IADLs  Anthropometric Measurements: weight, weight change  Biochemical Data, Medical Tests and Procedures: inflammatory profile, lipid profile, glucose/endocrine profile, gastrointestinal profile, electrolyte and renal panel  Nutrition-Focused Physical Findings: overall appearance     Nutrition Follow-Up    RD Follow-up?: Yes  By Vika Chandra, Registration Eligible, Provisional LDN

## 2022-11-17 NOTE — ASSESSMENT & PLAN NOTE
Starting having hemoptysis and melena with associated acute blood loss anemia earlier in hospital stay. Patient with known internal hemorrhoids, mild sigmoid diverticulosis, history of gastritis and + H pylori (2012 EGD).   - GI consulted 10/19, unable to perform EGD due to instability and respiratory failure at the time    Plan  - patient unable to take PO PPI due to NGT removal on 11/5, transition to IV PPI 40mg pantoprazole daily, return to per NG tube once replaced  - s/p EGD 11/14; Normal Study  - PPI transitioned to BID as concern for GERD  - Monitor CBC closely for signs of recurrent bleed  11/17 Hb trended dopwn to 6.6. Transfusion with 1 unit of PRBC . continue protonix 40mg IV BID

## 2022-11-17 NOTE — SUBJECTIVE & OBJECTIVE
Interval History: BUN decreased by 1, reported 200 cc UOP by patient, none recorded    Review of patient's allergies indicates:   Allergen Reactions    Ampicillin     Peaches [peach (prunus persica)] Other (See Comments)     Pt unable to state type of reaction. Information obtained from daughter who states she was informed of allergy from patient.    Penicillins      Other reaction(s): Hives    Sulfa (sulfonamide antibiotics) Rash and Hives     Current Facility-Administered Medications   Medication Frequency    0.9%  NaCl infusion (for blood administration) Q24H PRN    0.9%  NaCl infusion Once    acetaminophen tablet 650 mg Q4H PRN    albuterol-ipratropium 2.5 mg-0.5 mg/3 mL nebulizer solution 3 mL Q4H PRN    aluminum-magnesium hydroxide-simethicone 200-200-20 mg/5 mL suspension 30 mL QID PRN    aluminum-magnesium hydroxide-simethicone 200-200-20 mg/5 mL suspension 30 mL Once    And    LIDOcaine HCl 2% oral solution 15 mL Once    amLODIPine tablet 10 mg Daily    atovaquone 750 mg/5 mL oral liquid 1,500 mg Daily    bisacodyL suppository 10 mg Daily PRN    carvediloL tablet 25 mg BID    cloNIDine tablet 0.1 mg Q6H PRN    dextrose 10% bolus 125 mL PRN    dextrose 10% bolus 250 mL PRN    glucagon (human recombinant) injection 1 mg PRN    glucose chewable tablet 16 g PRN    glucose chewable tablet 24 g PRN    heparin (porcine) injection 1,000 Units PRN    heparin, porcine (PF) 100 unit/mL injection flush 500 Units PRN    hydrALAZINE tablet 100 mg Q8H    insulin aspart U-100 pen 1-10 Units Q6H PRN    levothyroxine tablet 25 mcg Before breakfast    lisinopriL tablet 40 mg Daily    melatonin tablet 6 mg Nightly PRN    methocarbamoL tablet 500 mg TID PRN    methylPREDNISolone sodium succinate injection 20 mg Daily    naloxone 0.4 mg/mL injection 0.02 mg PRN    nystatin 100,000 unit/mL suspension 500,000 Units QID    ondansetron injection 4 mg Q8H PRN    pantoprazole injection 40 mg Q12H    prochlorperazine injection Soln 5  mg Q6H PRN    QUEtiapine tablet 25 mg QHS    simethicone chewable tablet 80 mg QID PRN    sodium chloride 0.9% 250 mL flush bag 1 time in Clinic/[facility]    sodium chloride 0.9% flush 10 mL PRN    sodium chloride 0.9% flush 10 mL PRN    sodium chloride 0.9% flush 10 mL PRN    sodium chloride 0.9% flush 5 mL PRN    sodium chloride tablet tbso 2,000 mg TID     Facility-Administered Medications Ordered in Other Encounters   Medication Frequency    celecoxib capsule 400 mg Once    fentaNYL 50 mcg/mL injection  mcg PRN    LIDOcaine (PF) 10 mg/ml (1%) injection 10 mg Once PRN    LIDOcaine (PF) 10 mg/ml (1%) injection 10 mg Once    midazolam (VERSED) 1 mg/mL injection 0.5-4 mg PRN    ropivacaine 0.2% Nimbus PainPRO Pump infusion 500 ML Continuous       Objective:     Vital Signs (Most Recent):  Temp: 98 °F (36.7 °C) (11/17/22 0705)  Pulse: 70 (11/17/22 0705)  Resp: 18 (11/17/22 0705)  BP: 132/63 (11/17/22 0705)  SpO2: (!) 92 % (11/17/22 0705)  O2 Device (Oxygen Therapy): room air (11/17/22 0705)   Vital Signs (24h Range):  Temp:  [97.8 °F (36.6 °C)-98.5 °F (36.9 °C)] 98 °F (36.7 °C)  Pulse:  [70-78] 70  Resp:  [18-20] 18  SpO2:  [92 %-100 %] 92 %  BP: (115-153)/(56-63) 132/63     Weight: 57.6 kg (127 lb) (11/14/22 0958)  Body mass index is 24 kg/m².  Body surface area is 1.57 meters squared.    I/O last 3 completed shifts:  In: 120 [P.O.:120]  Out: -     Physical Exam  Vitals and nursing note reviewed.   Constitutional:       General: She is awake.      Appearance: She is well-developed. She is ill-appearing. She is not toxic-appearing or diaphoretic.   HENT:      Head: Normocephalic and atraumatic.      Right Ear: External ear normal.      Left Ear: External ear normal.      Nose:      Comments: + NGT     Mouth/Throat:      Mouth: Mucous membranes are dry.   Eyes:      General: Lids are normal. No scleral icterus.        Right eye: No discharge.         Left eye: No discharge.   Cardiovascular:      Rate and Rhythm:  Normal rate and regular rhythm.   Pulmonary:      Effort: Pulmonary effort is normal.      Breath sounds: Normal breath sounds. No wheezing or rhonchi.   Abdominal:      General: Bowel sounds are normal.      Palpations: Abdomen is soft.      Tenderness: There is no abdominal tenderness.   Musculoskeletal:         General: No deformity.      Cervical back: Normal range of motion and neck supple. No rigidity or tenderness.      Right lower leg: No edema.      Left lower leg: No edema.   Skin:     General: Skin is warm and dry.   Neurological:      General: No focal deficit present.      Mental Status: She is alert and oriented to person, place, and time. Mental status is at baseline.   Psychiatric:         Attention and Perception: Attention normal.         Behavior: Behavior normal.       Significant Labs:  All labs within the past 24 hours have been reviewed.     Significant Imaging:  Labs: Reviewed

## 2022-11-17 NOTE — PLAN OF CARE
Recommendations  Continue dysphagia, mechanical soft, low sodium/renal diet (texture per SLP)- encourage adequate intake as needed.  If TF recs warranted rec'd TF rate of Novasource to 35 mL/hr - 1680 kcals, 76 g of protein, 602 mL fluid.   RD following         Goals: Meet % EEN, EPN by RD f/u date  Nutrition Goal Status: progressing towards goal  Communication of RD Recs: POC

## 2022-11-17 NOTE — PT/OT/SLP PROGRESS
"Speech Language Pathology Treatment    Patient Name:  Kristin Goodman   MRN:  [id number]  Admitting Diagnosis: Microscopic polyangiitis    Recommendations:                 General Recommendations:  Dysphagia therapy  Diet recommendations:  Mechanical soft, Liquid Diet Level: Thin   Aspiration Precautions: 1 bite/sip at a time, Avoid talking while eating, Eliminate distractions, Feed only when awake/alert, Frequent oral care, HOB to 90 degrees, Meds crushed in puree, Small bites/sips, and Strict aspiration precautions   General Precautions: Standard, aspiration, fall  Communication strategies:  go to room if call light pushed    Subjective     Pt awake/alert.   "I'm not a good pill taker."    Pain/Comfort:  Pain Rating 1: 0/10    Respiratory Status: Room air    Objective:     Has the patient been evaluated by SLP for swallowing?   Yes  Keep patient NPO? No     Pt found awake/alert eating a blueberry muffin that family brought from home. RN at bedside administering medication. Pt hesitant to take medication t/o session and expressed frustration. She was seen with small whole pills x2, meds crushed in puree x1, small bites of muffin x2 and thin liquid (apple juice) x4 sips. Pt oral holding and facial grimacing with meds crushed in puree. Adequate bolus control and timely swallow initiation seen with whole meds, puree, muffin and thin liquids. No overt s/sx of airway compromise observed across all consistencies. SLP provided education on aspiration safety/precautions and SLP POC. Pt verbalized understanding. RN remained in room upon SLP exit.  Assessment:     Kristin Goodman is a 77 y.o. female with an SLP diagnosis of Dysphagia.      Goals:   Multidisciplinary Problems       SLP Goals          Problem: SLP    Goal Priority Disciplines Outcome   SLP Goal     SLP Ongoing, Progressing   Description: Speech Language Pathology Goals  Goals expected to be met by 11/14/22    1. Pt will participate in ongoing assessment of swallow " function to determine safest, least restrictive means of nutrition/hydration  2. Educate Pt and family on aspiration precautions and SLP POC  3. Pt will tolerate trials of nectar-thickened liquids w/o overt S/S aspiration, MIN A  4. Pt will complete dysphagia exercises to improve timing of initiation of swallow x5 with 90% accuracy, MIN A                         Plan:     Patient to be seen:  4 x/week   Plan of Care expires:  11/29/22  Plan of Care reviewed with:  daughter, patient   SLP Follow-Up:  Yes       Discharge recommendations:  nursing facility, skilled   Barriers to Discharge:  None    Time Tracking:     SLP Treatment Date:   11/17/22  Speech Start Time:  1020  Speech Stop Time:  1038     Speech Total Time (min):  10 min    Billable Minutes: Treatment Swallowing Dysfunction 10    11/17/2022

## 2022-11-17 NOTE — PROGRESS NOTES
[facility] ([address] Medicine  Progress Note    Patient Name: Kristin Goodman  MRN: [id number]  Patient Class: IP- Inpatient   Admission Date: 10/17/2022  Length of Stay: 31 days  Attending Physician: West Quinn MD  Primary Care Provider: Lori Hernandez MD        Subjective:     Principal Problem:Microscopic polyangiitis        HPI:  Kristin Goodman is a 77 y.o. female with a past medical history of HTN, hypothyroidism, HFpEF, and osteoarthritis of the arm who has presented to the ED for cough, SOB, and weakness. Daughter is present at the bedside. Patient presented to the ED on 9/24 with hemoptysis, CT and CXR showed high suspicion for multilobar pneumonia. Patient was discharged with a 10-day course of levofloxacin and an albuterol inhaler. Patient followed up with her PCP on 10/4 with slight improvement in symptoms and went back to work. Patient continued to have worsening symptoms and presented to the ED again on 10/14 for evaluation and no interventions were done. Patient endorses fevers over the last few days up to 102.4 F with progressive SOB. She endorses hemoptysis with moderate amount of blood, generalized weakness, productive cough, SOB, and loss of appetite. Denies chest pain, nausea, vomiting, abdominal pain, or urinary changes.    ED: hypertensive up to 219/93 and tachycardic up to 117. Oxygen saturation on 92% on RA, placed on 5L NC with sats >97%. CBC remarkable for leukocytosis of 16.03 and Hb 7.7. K 3.3. Cr 1.6, baseline ~1.0. . Troponin 0.061. COVID and flu negative. EKG NSR. CT chest with contrast pending at time of admission. Given home amlodipine and lisinopril. Given 500mL NS, IV azithromycin, cefepime and vanc.       Overview/Hospital Course:  Ms. Goodman was admitted to Hospital Medicine for management of multifocal pneumonia and acute hypoxemic respiratory failure after presenting to the ED with fevers, cough, hemoptysis, and dyspnea. She required  escalation to the Medical ICU due to abrupt decline in her respiratory status, associated with hemoptysis and requiring NIPPV. CT chest showed bilateral patchy ground glass opacities. Labs additionally were notable for acute renal failure on CKD3. Pulmonology was consulted while under the care of [facility] and felt this picture was consistent with diffuse alveolar hemorrhage.     Her workup revealed a strongly positive MPO Ab consistent with clinical picture of microscopic polyangiitis with pulmonary and renal involvement. She underwent Trialysis catheter placement 10/25/2022 in the Medical ICU. 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     Rheumatology was consulted, extensive workup revealed MITUL + 1:2560 homogenous, +dsDNA, normal complements, PR3 1.2 (slight +),  (+), cANCA + 1:80, pANCA neg, GBMAb neg, trace cryos. Due to concern for MPA, she was started on pulse dose IV methylprednisolone 1000mg for 3 days, and plasmapheresis under the guidance of Transfusion Medicine. She remains on a steroid taper and has completed PLEX. She additionally received rituximab and cyclophosphamide. OI prophylaxis was provided with atovaquone due to a sulfa allergy.     Nephrology was consulted for evaluation of acute renal failure in the setting of MPA. She did require SLED in the ICU for renal clearance and remains on intermittent hemodialysis. She is expected to continue HD once discharged.     Her acute hypoxemic respiratory failure improved and she was weaned off of supplemental oxygen. She stepped down to [facility] 10/28.     She underwent MBBS for evaluation of dysphagia, which showed global delayed initiation of swallow and aspiration with thin liquids. Due to  concern for possible prior stroke, head imaging was completed and negative for acute finding. She is NPO with NG tube. ENT was consulted for evaluation of dysphagia and cervical adenopathy.  Patient did not tolerate laryngoscopy; unable to visualize vocal cords but given her lack of hoarseness, they did not suspect vocal fold paresis/paralysis. MRI recommended by rheum to fully rule out any potential neurological cause of the patient's dysphagia; but demonstrated   remote left thalamic lacunar type infarct and punctate remote left cerebellar infarcts.  She is continuing to work with speech therapy.  EGD completed 11/14 without abnormalities.  Per GI, Suspect some aspect of esophageal dysmotility in setting of critical illness. No further testing at this time.  Per SLP, diet advanced on 11/15 and NG tube removed.    Her other chronic medical conditions including hypothyroidism, diastolic CHF, and hypertension were managed with her home medications, with dose adjustments as needed.     11/17 Hb trended dopwn to 6.6. Transfusion with 1 unit of PRBC .  Rheumatology following for steroid dosing and chemotherapeutic agents. on IV steroids due to p.o. intake issues, however can transition to p.o. when swallowing issues reliably resolved - on steroid taper - solumedrol 20 mg qd until 11/20, then solumedrol  15mg  x 14 days, then solumedrol 12.5mg x  14 days, then 10 for 14 days, then 7.5 for 14 days then possibly life long 5 mg.  rituximab dose due November 17; has been . Nephrology following for HD and  monitoring for renal recovery with some  improvement in UOP in last 2 days. will need PermCath and HD chair set up if no renal  recovery.  likely SNF pending nephro decision regarding HD . No need for HD toda. Continue 2 grams of sodium chloride tablets TID for likely SIADH. SLP recs Mechanical soft, Liquid Diet Level: Thin            Review of Systems:   Pain scale:   Constitutional:  fever,  chills, headache, vision loss,  hearing loss, weight loss, Generalized weakness, falls, loss of smell, loss of taste, poor appetite,  sore throat  Respiratory: cough, shortness of breath.   Cardiovascular: chest pain, dizziness, palpitations, orthopnea, swelling of feet, syncope  Gastrointestinal: nausea, vomiting, abdominal pain, diarrhea, black stool,  blood in stool, change in bowel habits  Genitourinary: hematuria, dysuria, urgency, frequency  Integument/Breast: rash,  pruritis  Hematologic/Lymphatic: easy bruising, lymphadenopathy  Musculoskeletal: arthralgias , myalgias, back pain, neck pain, knee pain  Neurological: confusion, seizures, tremors, slurred speech  Behavioral/Psych:  depression, anxiety, auditory or visual hallucinations     OBJECTIVE:     Physical Exam:  Body mass index is 24 kg/m².    Constitutional: Appears well-developed and well-nourished.   Head: Normocephalic and atraumatic.   Neck: Normal range of motion. Neck supple.   Cardiovascular: Normal heart rate.  Regular heart rhythm.  Pulmonary/Chest: Effort normal.   Abdominal: No distension.  No tenderness  Musculoskeletal: Normal range of motion. No edema.   Neurological: Alert and oriented to person, place, and time.   Skin: Skin is warm and dry.   Psychiatric: Normal mood and affect. Behavior is normal.                  Vital Signs  Temp: 98 °F (36.7 °C) (11/17/22 1535)  Pulse: 70 (11/17/22 1535)  Resp: 19 (11/17/22 1535)  BP: (Abnormal) 145/70 (11/17/22 1535)  SpO2: (Abnormal) 94 % (11/17/22 1535)     24 Hour VS Range    Temp:  [97.8 °F (36.6 °C)-98.5 °F (36.9 °C)]   Pulse:  [70-78]   Resp:  [17-20]   BP: (115-166)/(56-74)   SpO2:  [92 %-98 %]     Intake/Output Summary (Last 24 hours) at 11/17/2022 1646  Last data filed at 11/17/2022 1535  Gross per 24 hour   Intake 203.75 ml   Output 400 ml   Net -196.25 ml         I/O This Shift:  I/O this shift:  In: 203.8 [Blood:203.8]  Out: 400 [Urine:400]    Wt Readings from Last 3 Encounters:   11/14/22 57.6 kg (127 lb)   10/14/22  68 kg (150 lb)   10/04/22 68 kg (149 lb 14.6 oz)       I have personally reviewed the vitals and recorded Intake/Output     Laboratory/Diagnostic Data:    CBC/Anemia Labs: Coags:    Recent Labs   Lab 11/13/22  0233 11/15/22  0446 11/17/22  0234   WBC 6.76 8.10 6.93   HGB 7.5* 7.1* 6.5*   HCT 23.3* 22.4* 20.4*   * 201 221   MCV 91 91 92   RDW 16.2* 15.5* 15.0*    No results for input(s): PT, INR, APTT in the last 168 hours.     Chemistries: ABG:   Recent Labs   Lab 11/11/22  0426 11/12/22  0631 11/13/22  0233 11/14/22  0406 11/15/22  0446 11/15/22  0447 11/16/22  0513 11/17/22  0234   * 139 138   < >  --  131* 129* 133*   K 4.7 3.6 3.9   < >  --  4.1 4.1 4.7    101 97   < >  --  97 93* 97   CO2 15* 29 28   < >  --  26 24 22*   BUN 84* 45* 56*   < >  --  84* 91* 90*   CREATININE 5.1* 3.4* 4.6*   < >  --  6.5* 7.5* 8.3*   CALCIUM 7.7* 7.5* 7.5*   < >  --  7.7* 7.4* 7.7*   PROT 5.0* 4.5* 4.6*  --   --   --   --   --    BILITOT 0.5 0.5 0.6  --   --   --   --   --    ALKPHOS 60 58 60  --   --   --   --   --    ALT 20 21 19  --   --   --   --   --    AST 16 17 16  --   --   --   --   --    MG 1.9 1.7 1.8   < > 1.9  --  1.9 2.0   PHOS 6.7* 4.4 4.9*   < > 6.3*  --  5.7* 6.4*    < > = values in this interval not displayed.    No results for input(s): PH, PCO2, PO2, HCO3, POCSATURATED, BE in the last 168 hours.     POCT Glucose: HbA1c:    Recent Labs   Lab 11/16/22  1529 11/17/22  0023 11/17/22  0410 11/17/22  0706 11/17/22  1221 11/17/22  1534   POCTGLUCOSE 153* 132* 111* 96 174* 202*    Hemoglobin A1C   Date Value Ref Range Status   08/02/2022 5.5 4.0 - 5.6 % Final     Comment:     ADA Screening Guidelines:  5.7-6.4%  Consistent with prediabetes  >or=6.5%  Consistent with diabetes    High levels of fetal hemoglobin interfere with the HbA1C  assay. Heterozygous hemoglobin variants (HbS, HgC, etc)do  not significantly interfere with this assay.   However, presence of multiple variants may affect accuracy.      11/22/2021 5.4 4.0 - 5.6 % Final     Comment:     ADA Screening Guidelines:  5.7-6.4%  Consistent with prediabetes  >or=6.5%  Consistent with diabetes    High levels of fetal hemoglobin interfere with the HbA1C  assay. Heterozygous hemoglobin variants (HbS, HgC, etc)do  not significantly interfere with this assay.   However, presence of multiple variants may affect accuracy.     01/08/2021 5.3 4.0 - 5.6 % Final     Comment:     ADA Screening Guidelines:  5.7-6.4%  Consistent with prediabetes  >or=6.5%  Consistent with diabetes  High levels of fetal hemoglobin interfere with the HbA1C  assay. Heterozygous hemoglobin variants (HbS, HgC, etc)do  not significantly interfere with this assay.   However, presence of multiple variants may affect accuracy.          Cardiac Enzymes: Ejection Fractions:    No results for input(s): CPK, CPKMB, MB, TROPONINI in the last 72 hours. EF   Date Value Ref Range Status   10/17/2022 65 % Final          No results for input(s): COLORU, APPEARANCEUA, PHUR, SPECGRAV, PROTEINUA, GLUCUA, KETONESU, BILIRUBINUA, OCCULTUA, NITRITE, UROBILINOGEN, LEUKOCYTESUR, RBCUA, WBCUA, BACTERIA, SQUAMEPITHEL, HYALINECASTS in the last 48 hours.    Invalid input(s): WRIGHTSUR    Procalcitonin (ng/mL)   Date Value   10/14/2022 0.12   09/24/2022 0.06     Lactate (Lactic Acid) (mmol/L)   Date Value   10/23/2022 0.9   10/17/2022 0.9   09/24/2022 0.7     BNP (pg/mL)   Date Value   10/23/2022 140 (H)   10/20/2022 335 (H)   10/17/2022 188 (H)   10/14/2022 281 (H)   09/24/2022 109 (H)     CRP (mg/L)   Date Value   04/06/2022 2.8     Sed Rate (mm/Hr)   Date Value   04/06/2022 54 (H)     D-Dimer (mg/L FEU)   Date Value   10/14/2022 1.96 (H)     Ferritin (ng/mL)   Date Value   10/30/2022 374 (H)     No results found for: LDH  Troponin I (ng/mL)   Date Value   10/23/2022 0.008   10/17/2022 0.052 (H)   10/17/2022 0.061 (H)   10/14/2022 <0.006   09/24/2022 0.006   04/18/2022 <0.006   08/13/2019 <0.006   04/28/2019 0.019      CPK (U/L)   Date Value   11/06/2022 92   04/18/2022 362 (H)   01/08/2021 317 (H)   04/28/2019 486 (H)   04/28/2019 484 (H)   04/27/2019 274 (H)   12/08/2016 216 (H)   07/30/2016 354 (H)     No results found for this or any previous visit.  SARS-CoV2 (COVID-19) Qualitative PCR (no units)   Date Value   10/24/2022 Not Detected   02/14/2022 Not Detected   09/25/2020 Not Detected   05/21/2020 Not Detected     SARS-CoV-2 RNA, Amplification, Qual (no units)   Date Value   10/17/2022 Negative     POC Rapid COVID (no units)   Date Value   09/24/2022 Negative       Microbiology labs for the last week  Microbiology Results (last 7 days)       Procedure Component Value Units Date/Time    Blood culture [[id number]] Collected: 11/06/22 1447    Order Status: Completed Specimen: Blood Updated: 11/11/22 1812     Blood Culture, Routine No growth after 5 days.    Narrative:      Collection has been rescheduled by VS1 at 11/06/2022 11:40 Reason:   Patient unavailable went out for an Xray will come bacl  Collection has been rescheduled by VS1 at 11/06/2022 11:40 Reason:   Patient unavailable went out for an Xray will come bacl    Blood culture [[id number]] Collected: 11/06/22 1447    Order Status: Completed Specimen: Blood Updated: 11/11/22 1812     Blood Culture, Routine No growth after 5 days.    Narrative:      Collection has been rescheduled by VS1 at 11/06/2022 11:40 Reason:   Patient unavailable went out for an Xray will come bacl  Collection has been rescheduled by VS1 at 11/06/2022 11:40 Reason:   Patient unavailable went out for an Xray will come bacl            Reviewed and noted in plan where applicable- Please see chart for full lab data.    Lines/Drains:  Trialysis (Dialysis) Catheter 10/25/22 2329 left internal jugular (Active)   Line Necessity Review CRRT/HD 11/13/22 2100   Verification by X-ray Yes 11/13/22 2100   Site Assessment No drainage;No swelling;No redness;No warmth 11/17/22 0945   Line Securement Device  Secured with sutures 11/17/22 0945   Dressing Type Biopatch in place;Transparent (Tegaderm) 11/17/22 0945   Dressing Status Clean;Dry;Intact 11/17/22 0945   Dressing Intervention Integrity maintained 11/17/22 0945   Date on Dressing 11/09/22 11/15/22 1042   Dressing Due to be Changed 11/16/22 11/15/22 1042   Venous Patency/Care flushed w/o difficulty 11/15/22 1042   Arterial Patency/Care flushed w/o difficulty 11/15/22 1042   Distal Patency/Care flushed w/o difficulty 11/15/22 1042   Flows Good 11/11/22 1320   Waveform Not being transduced 11/10/22 1901   Number of days: 22            Midline Catheter Insertion/Assessment  - Single Lumen 10/18/22 1831 Left brachial vein 18g x 10cm (Active)   Site Assessment Clean;Dry;Intact 11/17/22 0945   IV Device Securement catheter securement device 11/17/22 0945   Line Status Saline locked 11/17/22 0945   Dressing Type Biopatch in place;Transparent (Tegaderm) 11/17/22 0945   Dressing Status Clean;Dry;Intact 11/17/22 0945   Dressing Intervention Integrity maintained 11/17/22 0945   Dressing Change Due 11/22/22 11/15/22 2000   Site Change Due 11/15/22 11/15/22 2000   Reason Not Rotated Poor venous access 11/15/22 2000   Number of days: 29       Imaging  ECG Results              EKG 12-lead (Final result)  Result time 10/17/22 14:26:15      Final result by Interface, Lab In [facility] (10/17/22 14:26:15)               Narrative:    Test Reason : R06.02,    Vent. Rate : 093 BPM     Atrial Rate : 093 BPM     P-R Int : 126 ms          QRS Dur : 070 ms      QT Int : 356 ms       P-R-T Axes : 048 052 030 degrees     QTc Int : 442 ms    Normal sinus rhythm  Normal ECG  When compared with ECG of 14-OCT-2022 14:26,  Limb lead reversal has been corrected  Confirmed by Jc Barbosa MD ([id number]) on 10/17/2022 2:26:04 PM    Referred By: [email or web address]   SELF           Confirmed By:Jc Barbosa MD                                  Results for orders placed during the hospital encounter of  10/17/22    Echo    Interpretation Summary  · The left ventricle is normal in size with normal systolic function. The estimated ejection fraction is 65%.  · Normal right ventricular size with normal right ventricular systolic function.  · Grade I left ventricular diastolic dysfunction.  · Mild tricuspid regurgitation.  · There is pulmonary hypertension. The estimated PA systolic pressure is 56 mmHg.  · Normal to low central venous pressure (3 mmHg).      X-Ray Abdomen AP 1 View  Narrative: EXAMINATION:  XR ABDOMEN AP 1 VIEW    CLINICAL HISTORY:  NGT placement; Dysphagia, unspecified    TECHNIQUE:  AP View(s) of the abdomen was performed.    COMPARISON:  No 11/20/2022 ne    FINDINGS:  Feeding tube in the stomach.  No significant bowel dilatation.  Impression: See above    Electronically signed by: Ej Strickland MD  Date:    11/14/2022  Time:    11:02      Labs, Imaging, EKG and Diagnostic results from 11/17/2022 were reviewed.    Medications:  Medication list was reviewed and changes noted under Assessment/Plan.  Current Facility-Administered Medications on File Prior to Encounter   Medication Dose Route Frequency Provider Last Rate Last Admin    celecoxib capsule 400 mg  400 mg Oral Once Dieudonne Marshall MD        fentaNYL 50 mcg/mL injection  mcg   mcg Intravenous PRN Dieudonne Marshall MD   100 mcg at 12/21/21 0919    LIDOcaine (PF) 10 mg/ml (1%) injection 10 mg  1 mL Intradermal Once PRN Dieudonne Marshall MD        LIDOcaine (PF) 10 mg/ml (1%) injection 10 mg  1 mL Intradermal Once Dieudonne Marshall MD        midazolam (VERSED) 1 mg/mL injection 0.5-4 mg  0.5-4 mg Intravenous PRN Dieudonne Marshall MD   2 mg at 12/21/21 0919    ropivacaine 0.2% [facility] PainPRO Pump infusion 500 ML   Perineural Continuous Dieudonne Marshall MD   New Bag at 12/21/21 1153     Current Outpatient Medications on File Prior to Encounter   Medication Sig Dispense Refill    ACETAMINOPHEN (TYLENOL ARTHRITIS PAIN  ORAL) Take 1 tablet by mouth once daily.       albuterol (VENTOLIN HFA) 90 mcg/actuation inhaler Inhale 2 puffs into the lungs every 6 (six) hours as needed for Shortness of Breath. Rescue 18 g 0    amLODIPine (NORVASC) 10 MG tablet Take 1 tablet (10 mg total) by mouth once daily. 90 tablet 3    aspirin 325 MG tablet Take 1 tablet at lunch daily starting after surgery for 6 weeks to prevent DVT. 42 tablet 0    diclofenac sodium (VOLTAREN) 1 % Gel Apply 2 g topically 4 (four) times daily. for 10 days 400 g 0    epinastine 0.05 % ophthalmic solution Place 1 drop into both eyes once daily.       EUTHYROX 25 mcg tablet Take 1 tablet by mouth once daily 90 tablet 0    fish,bora,flax oils-om3,6,9no1 (OMEGA 3-6-9) 1,200 mg Cap Take 1 each by mouth once daily. 30 capsule 3    fluticasone propionate (FLONASE) 50 mcg/actuation nasal spray 1 spray (50 mcg total) by Each Nostril route 2 (two) times daily. 16 g 0    FLUZONE HIGHDOSE QUAD 20-21  mcg/0.7 mL Syrg ADM 0.7ML IM UTD      hydrALAZINE (APRESOLINE) 100 MG tablet TAKE 1 TABLET BY MOUTH THREE TIMES DAILY 90 tablet 0    HYDROcodone-acetaminophen (NORCO) 5-325 mg per tablet Take 1 tablet by mouth every 6 (six) hours as needed.      ibuprofen (ADVIL,MOTRIN) 800 MG tablet Take 800 mg by mouth every 8 (eight) hours as needed.      levocetirizine (XYZAL) 5 MG tablet Take 1 tablet (5 mg total) by mouth every evening. For sinus 30 tablet 0    lisinopriL (PRINIVIL,ZESTRIL) 40 MG tablet Take 1 tablet by mouth once daily 90 tablet 0    meloxicam (MOBIC) 15 MG tablet Take 1 tablet (15 mg total) by mouth daily as needed (arthritis). 30 tablet 2    methocarbamoL (ROBAXIN) 500 MG Tab Take 1 tablet (500 mg total) by mouth 3 (three) times daily as needed (muscle spasms). 40 tablet 0    metoprolol succinate (TOPROL-XL) 50 MG 24 hr tablet Take 1 tablet by mouth once daily 90 tablet 0    mupirocin (BACTROBAN) 2 % ointment Apply topically 2 (two) times daily.      tiZANidine (ZANAFLEX) 4  MG tablet Take 1 tablet by mouth twice daily as needed 20 tablet 0     Scheduled Medications:  sodium chloride 0.9%, , Intravenous, Once  aluminum-magnesium hydroxide-simethicone, 30 mL, Oral, Once   And  LIDOcaine HCl 2%, 15 mL, Oral, Once  amLODIPine, 10 mg, Per NG tube, Daily  atovaquone, 1,500 mg, Per NG tube, Daily  carvediloL, 25 mg, Per NG tube, BID  hydrALAZINE, 100 mg, Per NG tube, Q8H  levothyroxine, 25 mcg, Per NG tube, Before breakfast  lisinopriL, 40 mg, Per NG tube, Daily  nystatin, 500,000 Units, Oral, QID  pantoprozole (PROTONIX) IV, 40 mg, Intravenous, Q12H  predniSONE, 25 mg, Oral, Daily   Followed by  [START ON 11/20/2022] predniSONE, 20 mg, Oral, Daily   Followed by  [START ON 12/4/2022] predniSONE, 12.5 mg, Oral, Daily   Followed by  [START ON 12/18/2022] predniSONE, 10 mg, Oral, Daily   Followed by  [START ON 1/1/2023] predniSONE, 5 mg, Oral, Daily  QUEtiapine, 25 mg, Oral, QHS  rituximab (RITUXAN) infusion (conc: 1 mg/mL), 375 mg/m2, Intravenous, 1 time in Clinic/HOD  sodium chloride 0.9% flush bag IVPB, , Intravenous, 1 time in Clinic/HOD  sodium chloride, 2,000 mg, Oral, TID    PRN: sodium chloride, sodium chloride, acetaminophen, albuterol-ipratropium, aluminum-magnesium hydroxide-simethicone, bisacodyL, cloNIDine, dextrose 10%, dextrose 10%, glucagon (human recombinant), glucose, glucose, heparin (porcine), heparin, porcine (PF), heparin, porcine (PF), insulin aspart U-100, melatonin, meperidine, methocarbamoL, naloxone, ondansetron, prochlorperazine, simethicone, sodium chloride 0.9%, sodium chloride 0.9%, sodium chloride 0.9%, sodium chloride 0.9%, sodium chloride 0.9%  Infusions:   Estimated Creatinine Clearance: 4.8 mL/min (A) (based on SCr of 8.3 mg/dL (H)).    Assessment/Plan:      * Microscopic polyangiitis  As of 10/26/22 strongly positive MPO Ab (in contrast to borderline positive PR3), consistent with clinical picture of microscopic polyangiitis with pulmonary, and renal  involvement after presenting with acute hypoxemic respiratory failure, hemoptysis, and acute renal failure.  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   3)  CHANGES SUGGESTIVE OF FOCAL ACUTE PYELONEPHRITIS.   4)  MILD-TO-MODERATE ARTERIONEPHROSCLEROSIS   - Rheumatology consulted, appreciate evaluation and recommendations     - MITUL + 1:2560 homogenous, +dsDNA, normal complements     - PR3 1.2 (slight +),  (+)     - cANCA + 1:80, pANCA neg     - GBMAb neg, trace cryos  - Transfusion Medicine consulted for apheresis  - Nephrology consulted, see separate documentation for WILFREDO      Plan  - Steroids:    - s/p IV Solumedrol 1000 mg x3 doses. Now following PEXIVAS steroid taper. Currently on IV Solumedrol 20 mg daily.   - plan for 20mg IV daily on 11/6 for 14 days (last dose of 20mg equivalent is 11/20/2022).   - apheresis: underwent PLEX 10/26, 10/27, 10/30, 11/1, 11/2, 11/3  - rituximab every 7 days for 4 doses: Dose #1: 10/27, #2 11/3, #3 11/10; Next Rituximab 375 mg/m^2 due Nov 17th   - cyclophosphamide every 14 days for 2 doses: 10/27, 11/10  - Opportunistic Infection ppx: atovaquone 1500mg PO daily  - GI bleed prophylaxis: pantoprazole 40mg daily   11/17 Rheumatology following for steroid dosing and chemotherapeutic agents. on IV steroids due to p.o. intake issues, however can transition to p.o. when swallowing issues reliably resolved - on steroid taper - solumedrol 20 mg qd until 11/20, then solumedrol  15mg  x 14 days, then solumedrol 12.5mg x  14 days, then 10 for 14 days, then 7.5 for  14 days then possibly life long 5 mg.  rituximab dose due November 17     Gastric reflux  PPI BID  Elevated HOB; feeds changed to bolus and tolerating well  Symptoms initially improved however reoccurrence on 11/13 without significant dietary changes; GI planning for EGD 11/14  TF further adjusted for smaller, more frequent bolus   s/p EGD 11/14; Normal Study      High risk medications (not anticoagulants) long-term use  as above      Anuria    11/17 Nephrology following for HD and  monitoring for renal recovery with some  improvement in UOP in last 2 days. will need PermCath and HD chair set up if no renal  recovery.       Gastrointestinal hemorrhage with melena  Starting having hemoptysis and melena with associated acute blood loss anemia earlier in hospital stay. Patient with known internal hemorrhoids, mild sigmoid diverticulosis, history of gastritis and + H pylori (2012 EGD).   - GI consulted 10/19, unable to perform EGD due to instability and respiratory failure at the time    Plan  - patient unable to take PO PPI due to NGT removal on 11/5, transition to IV PPI 40mg pantoprazole daily, return to per NG tube once replaced  - s/p EGD 11/14; Normal Study  - PPI transitioned to BID as concern for GERD  - Monitor CBC closely for signs of recurrent bleed  11/17 Hb trended dopwn to 6.6. Transfusion with 1 unit of PRBC . continue protonix 40mg IV BID    Dysphagia  SLP following  MBSS showing global weakness in swallowing as well as aspiration with thin liquids.   ENT consulted but patient did not tolerate laryngoscopy     Plan   - Discussed case with Rheumatology team, they are concerned that this dysphagia may have been from prior stroke, requesting imaging for evaluation-  CT demonstrated no acute findings or causes for dysphagia NOR did MRI   - removed NGT and place dobhoff which is more comfortable and makes swallowing easier compared to NGT.    - continue working with speech therapy   -exam concerning for  thrush; nystatin swish and swallow initiated; GI consulted as concern that oropharyngeal candidiasis may be contributing to dysphagia  -Per GI, Lower suspicion for esophageal candidiasis without odynophagia however can If white plaques have been seen on oropharynx, ok to start fluconazole empirically for possible esophageal candidiasis  -EGD on 11/14 without abnormality; per GI, Suspect some aspect of esophageal dysmotility in setting of critical illness. No further testing at this time.  -diet initiated per SLP on 11/16; NG tube removed  -continue to monitor p.o. intake closely  11/17 SLP recs Mechanical soft, Liquid Diet Level: Thin     Moderate malnutrition  Nutrition consulted. Most recent weight and BMI monitored-          Malnutrition (Moderate to Severe)  Weight Loss (Malnutrition): 7.5% in 3 months              Measurements:  Wt Readings from Last 1 Encounters:   11/14/22 57.6 kg (127 lb)   Body mass index is 24 kg/m².    Recommendations: Recommendation/Intervention: 1. As tolerated, increase TF rate (of Novasource) to 35 mL/hr  - 2. RD to monitor & follow-up.  Goals: Meet % EEN, EPN by RD f/u date    Patient has been screened and assessed by RD. RD will follow patient.      Encounter for continuous renal replacement therapy (CRRT) for acute renal failure  Due to microscopic polyangiitis. Remains on hemodialysis intermittently.   - Baseline creatinine 1.0-1.2.   - New onset proteinuria/hematuria.   - Renal biopsy completed and   - Trialysis in place for intermittent Hemodialysis    Plan  - Nephrology consulted, appreciate management  - management of MPA as noted separately  - Renal function panel daily  - renally dose all medications  - intermittent Hemodialysis as indicated, per Nephrology   11/17     Recent Labs   Lab 11/15/22  0447 11/16/22  0513 11/17/22  0234   BUN 84* 91* 90*   CREATININE 6.5* 7.5* 8.3*     . Nephrology following for HD and  monitoring for renal recovery with some  improvement in  UOP in last 2 days. will need PermCath and HD chair set up if no renal  recovery.  likely SNF pending nephro decision regarding HD     Acute hypoxemic respiratory failure  Multifocal pneumonia  Hemoptysis  Dyspnea  Diffuse Alveolar Hemorrahge  In the setting of a new diagnosis of microscopic polyangiitis, presented with fevers, dyspnea,.  - Chest imaging was consistent with diffuse alveolar hemorrhage.  CT chest wc 10/17 with bl perihilar dense consolidations w/ peripheral patcy areas of GGO, BL PNA, less c/f cardiogenic pulmonary edema.  - Patient upgraded to Medical ICU 10/23/22 with abrupt respiratory decline requiring NIPPV, improved with high dose IV steroids, plasmapheresis, emergent hemodialysis.   - Unable to perform bronchoscopy in the Medical ICU due to tenuous respiratory status  - As of 11/6, hypoxemia has significantly improved    Plan:  - weaned to room air  - continue management of underlying triggers with steroid and immunosuppressants  - incentive spirometry and respiratory hygiene  11/17 sats 92% on RA    Lymphadenopathy of head and neck  - Has bilateral neck gland swelling with tenderness,consulted ENT  - No surgical intervention indicated   - Adenopathy likely reactive in nature   - Recommend further workup if it does not resolve within next 2 weeks     Hyponatremia  Has hyponatremia,started on NS.  11/17   - Patient with sodium Recent Labs   Lab 11/15/22  0447 11/16/22  0513 11/17/22  0234   * 129* 133*   . sodium trending up  . Continue 2 grams of sodium chloride tablets TID for likely SIADH      Acute blood loss anemia  In the setting of GI bleed with melena  - Evaluated by GI, see GI bleed documentation  - Last transfusion 10/28, Hgb slowly trending down since then  - Hgb 6.9 on 11/5      Plan  - Monitor CBC Daily   - Treat with pRBC transfusion PRN Hgb <7  - qualifies for transfusion on 11/5 with Hgb 6.9, 1u pRBC ordered  -stable   11/17 Hb trended dopwn to 6.6. Transfusion with 1 unit  of PRBC .consider GI eval    Chronic diastolic heart failure  Pulmonary Hypertension due to left heart disease  TTE (10/2022) EF 65%, G1DD  Home meds: Lisinopril, Toprol     No signs or symptoms to suggest acute exacerbation    Plan  - Active volume control with intermittent Hemodialysis per Nephrology   - Daily weights (standing if tolerated)  - Strict I/Os  - Fluid restriction 1.5 day  - Cardiac diet w/ 2 g Na restriction      Hyperkalemia    resolved    Primary hypertension  BP Readings from Last 1 Encounters:   11/17/22 132/63     - Patient is unable to tolerate PO mediations, all meds to be administered via NG tube  -Will continue to monitor blood pressure trend closely and adjust antihypertensive regimen as clinically indicated and tolerated.    amLODIPine tablet 10 mg, 10 mg, Per NG tube, Daily    carvediloL tablet 12.5 mg, 12.5 mg, Per NG tube, BID    hydrALAZINE tablet 25 mg, 25 mg, Per NG tube, Q8H    lisinopriL tablet 40 mg, 40 mg, Per NG tube, Daily      Hypothyroid  - Continue synthroid 25 mcg  - TSH 0.585 10/27/22    VTE Risk Mitigation (From admission, onward)           Ordered     heparin, porcine (PF) 100 unit/mL injection flush 500 Units  As needed (PRN)         11/17/22 1343     heparin, porcine (PF) 100 unit/mL injection flush 500 Units  As needed (PRN)         11/10/22 1430     heparin (porcine) injection 1,000 Units  As needed (PRN)         11/09/22 1601     heparin (porcine) injection 1,000 Units  As needed (PRN)         11/08/22 0854     Place sequential compression device  Until discontinued         10/25/22 1731     IP VTE HIGH RISK PATIENT  Once         10/17/22 1354     Reason for No Pharmacological VTE Prophylaxis  Once        Question:  Reasons:  Answer:  Risk of Bleeding    10/17/22 1354                    Discharge Planning   ANTHONY: 11/18/2022     Code Status: Full Code   Is the patient medically ready for discharge?: No    Reason for patient still in hospital (select all that apply):  Treatment  Discharge Plan A: Skilled Nursing Facility   Discharge Delays: None known at this time              West Quinn MD  Department of Hospital Medicine   [facility] - Intensive Care ([place])

## 2022-11-17 NOTE — ASSESSMENT & PLAN NOTE
In the setting of GI bleed with melena  - Evaluated by GI, see GI bleed documentation  - Last transfusion 10/28, Hgb slowly trending down since then  - Hgb 6.9 on 11/5      Plan  - Monitor CBC Daily   - Treat with pRBC transfusion PRN Hgb <7  - qualifies for transfusion on 11/5 with Hgb 6.9, 1u pRBC ordered  -stable   11/17 Hb trended dopwn to 6.6. Transfusion with 1 unit of PRBC .consider GI eval

## 2022-11-17 NOTE — ASSESSMENT & PLAN NOTE
BP Readings from Last 1 Encounters:   11/17/22 132/63     - Patient is unable to tolerate PO mediations, all meds to be administered via NG tube  -Will continue to monitor blood pressure trend closely and adjust antihypertensive regimen as clinically indicated and tolerated.    amLODIPine tablet 10 mg, 10 mg, Per NG tube, Daily    carvediloL tablet 12.5 mg, 12.5 mg, Per NG tube, BID    hydrALAZINE tablet 25 mg, 25 mg, Per NG tube, Q8H    lisinopriL tablet 40 mg, 40 mg, Per NG tube, Daily

## 2022-11-17 NOTE — ASSESSMENT & PLAN NOTE
SLP following  MBSS showing global weakness in swallowing as well as aspiration with thin liquids.   ENT consulted but patient did not tolerate laryngoscopy     Plan   - Discussed case with Rheumatology team, they are concerned that this dysphagia may have been from prior stroke, requesting imaging for evaluation-  CT demonstrated no acute findings or causes for dysphagia NOR did MRI   - removed NGT and place dobhoff which is more comfortable and makes swallowing easier compared to NGT.    - continue working with speech therapy   -exam concerning for thrush; nystatin swish and swallow initiated; GI consulted as concern that oropharyngeal candidiasis may be contributing to dysphagia  -Per GI, Lower suspicion for esophageal candidiasis without odynophagia however can If white plaques have been seen on oropharynx, ok to start fluconazole empirically for possible esophageal candidiasis  -EGD on 11/14 without abnormality; per GI, Suspect some aspect of esophageal dysmotility in setting of critical illness. No further testing at this time.  -diet initiated per SLP on 11/16; NG tube removed  -continue to monitor p.o. intake closely  11/17 SLP recs Mechanical soft, Liquid Diet Level: Thin

## 2022-11-18 LAB
ALBUMIN SERPL BCP-MCNC: 2.7 G/DL (ref 3.5–5.2)
ANION GAP SERPL CALC-SCNC: 14 MMOL/L (ref 8–16)
ANION GAP SERPL CALC-SCNC: 14 MMOL/L (ref 8–16)
BASOPHILS # BLD AUTO: 0.02 K/UL (ref 0–0.2)
BASOPHILS # BLD AUTO: 0.03 K/UL (ref 0–0.2)
BASOPHILS NFR BLD: 0.2 % (ref 0–1.9)
BASOPHILS NFR BLD: 0.3 % (ref 0–1.9)
BUN SERPL-MCNC: 96 MG/DL (ref 8–23)
BUN SERPL-MCNC: 98 MG/DL (ref 8–23)
CALCIUM SERPL-MCNC: 7.9 MG/DL (ref 8.7–10.5)
CALCIUM SERPL-MCNC: 8.1 MG/DL (ref 8.7–10.5)
CHLORIDE SERPL-SCNC: 101 MMOL/L (ref 95–110)
CHLORIDE SERPL-SCNC: 102 MMOL/L (ref 95–110)
CO2 SERPL-SCNC: 21 MMOL/L (ref 23–29)
CO2 SERPL-SCNC: 21 MMOL/L (ref 23–29)
CREAT SERPL-MCNC: 8.2 MG/DL (ref 0.5–1.4)
CREAT SERPL-MCNC: 8.7 MG/DL (ref 0.5–1.4)
DIFFERENTIAL METHOD: ABNORMAL
DIFFERENTIAL METHOD: ABNORMAL
EOSINOPHIL # BLD AUTO: 0 K/UL (ref 0–0.5)
EOSINOPHIL # BLD AUTO: 0 K/UL (ref 0–0.5)
EOSINOPHIL NFR BLD: 0 % (ref 0–8)
EOSINOPHIL NFR BLD: 0 % (ref 0–8)
ERYTHROCYTE [DISTWIDTH] IN BLOOD BY AUTOMATED COUNT: 15.2 % (ref 11.5–14.5)
ERYTHROCYTE [DISTWIDTH] IN BLOOD BY AUTOMATED COUNT: 15.3 % (ref 11.5–14.5)
EST. GFR  (NO RACE VARIABLE): 4.4 ML/MIN/1.73 M^2
EST. GFR  (NO RACE VARIABLE): 4.7 ML/MIN/1.73 M^2
GLUCOSE SERPL-MCNC: 121 MG/DL (ref 70–110)
GLUCOSE SERPL-MCNC: 170 MG/DL (ref 70–110)
HCT VFR BLD AUTO: 27.1 % (ref 37–48.5)
HCT VFR BLD AUTO: 30 % (ref 37–48.5)
HGB BLD-MCNC: 8.7 G/DL (ref 12–16)
HGB BLD-MCNC: 9.5 G/DL (ref 12–16)
IMM GRANULOCYTES # BLD AUTO: 0.17 K/UL (ref 0–0.04)
IMM GRANULOCYTES # BLD AUTO: 0.17 K/UL (ref 0–0.04)
IMM GRANULOCYTES NFR BLD AUTO: 1.5 % (ref 0–0.5)
IMM GRANULOCYTES NFR BLD AUTO: 1.8 % (ref 0–0.5)
LYMPHOCYTES # BLD AUTO: 0.7 K/UL (ref 1–4.8)
LYMPHOCYTES # BLD AUTO: 1 K/UL (ref 1–4.8)
LYMPHOCYTES NFR BLD: 10.2 % (ref 18–48)
LYMPHOCYTES NFR BLD: 6.1 % (ref 18–48)
MAGNESIUM SERPL-MCNC: 2 MG/DL (ref 1.6–2.6)
MCH RBC QN AUTO: 29.2 PG (ref 27–31)
MCH RBC QN AUTO: 29.3 PG (ref 27–31)
MCHC RBC AUTO-ENTMCNC: 31.7 G/DL (ref 32–36)
MCHC RBC AUTO-ENTMCNC: 32.1 G/DL (ref 32–36)
MCV RBC AUTO: 91 FL (ref 82–98)
MCV RBC AUTO: 93 FL (ref 82–98)
MONOCYTES # BLD AUTO: 0.5 K/UL (ref 0.3–1)
MONOCYTES # BLD AUTO: 0.5 K/UL (ref 0.3–1)
MONOCYTES NFR BLD: 4.3 % (ref 4–15)
MONOCYTES NFR BLD: 5.6 % (ref 4–15)
NEUTROPHILS # BLD AUTO: 8 K/UL (ref 1.8–7.7)
NEUTROPHILS # BLD AUTO: 9.6 K/UL (ref 1.8–7.7)
NEUTROPHILS NFR BLD: 82.2 % (ref 38–73)
NEUTROPHILS NFR BLD: 87.8 % (ref 38–73)
NRBC BLD-RTO: 0 /100 WBC
NRBC BLD-RTO: 0 /100 WBC
PHOSPHATE SERPL-MCNC: 6.6 MG/DL (ref 2.7–4.5)
PHOSPHATE SERPL-MCNC: 6.8 MG/DL (ref 2.7–4.5)
PLATELET # BLD AUTO: 234 K/UL (ref 150–450)
PLATELET # BLD AUTO: 302 K/UL (ref 150–450)
PMV BLD AUTO: 10.7 FL (ref 9.2–12.9)
PMV BLD AUTO: 11.3 FL (ref 9.2–12.9)
POCT GLUCOSE: 144 MG/DL (ref 70–110)
POCT GLUCOSE: 163 MG/DL (ref 70–110)
POTASSIUM SERPL-SCNC: 4.6 MMOL/L (ref 3.5–5.1)
POTASSIUM SERPL-SCNC: 5 MMOL/L (ref 3.5–5.1)
RBC # BLD AUTO: 2.98 M/UL (ref 4–5.4)
RBC # BLD AUTO: 3.24 M/UL (ref 4–5.4)
SODIUM SERPL-SCNC: 136 MMOL/L (ref 136–145)
SODIUM SERPL-SCNC: 137 MMOL/L (ref 136–145)
WBC # BLD AUTO: 10.98 K/UL (ref 3.9–12.7)
WBC # BLD AUTO: 9.71 K/UL (ref 3.9–12.7)

## 2022-11-18 PROCEDURE — 80048 BASIC METABOLIC PNL TOTAL CA: CPT | Performed by: STUDENT IN AN ORGANIZED HEALTH CARE EDUCATION/TRAINING PROGRAM

## 2022-11-18 PROCEDURE — 63600175 PHARM REV CODE 636 W HCPCS: Performed by: STUDENT IN AN ORGANIZED HEALTH CARE EDUCATION/TRAINING PROGRAM

## 2022-11-18 PROCEDURE — 94761 N-INVAS EAR/PLS OXIMETRY MLT: CPT

## 2022-11-18 PROCEDURE — 99231 SBSQ HOSP IP/OBS SF/LOW 25: CPT | Mod: GC,,, | Performed by: INTERNAL MEDICINE

## 2022-11-18 PROCEDURE — 36415 COLL VENOUS BLD VENIPUNCTURE: CPT | Performed by: HOSPITALIST

## 2022-11-18 PROCEDURE — 83735 ASSAY OF MAGNESIUM: CPT

## 2022-11-18 PROCEDURE — 99233 PR SUBSEQUENT HOSPITAL CARE,LEVL III: ICD-10-PCS | Mod: ,,, | Performed by: HOSPITALIST

## 2022-11-18 PROCEDURE — 25000003 PHARM REV CODE 250: Performed by: STUDENT IN AN ORGANIZED HEALTH CARE EDUCATION/TRAINING PROGRAM

## 2022-11-18 PROCEDURE — 99233 SBSQ HOSP IP/OBS HIGH 50: CPT | Mod: ,,, | Performed by: HOSPITALIST

## 2022-11-18 PROCEDURE — 25000003 PHARM REV CODE 250

## 2022-11-18 PROCEDURE — 25000003 PHARM REV CODE 250: Performed by: HOSPITALIST

## 2022-11-18 PROCEDURE — 99231 PR SUBSEQUENT HOSPITAL CARE,LEVL I: ICD-10-PCS | Mod: GC,,, | Performed by: INTERNAL MEDICINE

## 2022-11-18 PROCEDURE — 99231 PR SUBSEQUENT HOSPITAL CARE,LEVL I: ICD-10-PCS | Mod: ,,, | Performed by: HOSPITALIST

## 2022-11-18 PROCEDURE — 27000221 HC OXYGEN, UP TO 24 HOURS

## 2022-11-18 PROCEDURE — 20600001 HC STEP DOWN PRIVATE ROOM

## 2022-11-18 PROCEDURE — 99231 SBSQ HOSP IP/OBS SF/LOW 25: CPT | Mod: ,,, | Performed by: HOSPITALIST

## 2022-11-18 PROCEDURE — 84100 ASSAY OF PHOSPHORUS: CPT

## 2022-11-18 PROCEDURE — 85025 COMPLETE CBC W/AUTO DIFF WBC: CPT | Mod: 91 | Performed by: HOSPITALIST

## 2022-11-18 PROCEDURE — C9113 INJ PANTOPRAZOLE SODIUM, VIA: HCPCS | Performed by: STUDENT IN AN ORGANIZED HEALTH CARE EDUCATION/TRAINING PROGRAM

## 2022-11-18 PROCEDURE — 80069 RENAL FUNCTION PANEL: CPT | Performed by: HOSPITALIST

## 2022-11-18 RX ORDER — PANTOPRAZOLE SODIUM 40 MG/1
40 TABLET, DELAYED RELEASE ORAL
Status: DISCONTINUED | OUTPATIENT
Start: 2022-11-19 | End: 2022-12-13 | Stop reason: HOSPADM

## 2022-11-18 RX ADMIN — QUETIAPINE FUMARATE 25 MG: 25 TABLET ORAL at 09:11

## 2022-11-18 RX ADMIN — LISINOPRIL 40 MG: 20 TABLET ORAL at 09:11

## 2022-11-18 RX ADMIN — INSULIN ASPART 1 UNITS: 100 INJECTION, SOLUTION INTRAVENOUS; SUBCUTANEOUS at 12:11

## 2022-11-18 RX ADMIN — HYDRALAZINE HYDROCHLORIDE 100 MG: 50 TABLET ORAL at 01:11

## 2022-11-18 RX ADMIN — AMLODIPINE BESYLATE 10 MG: 10 TABLET ORAL at 09:11

## 2022-11-18 RX ADMIN — HYDRALAZINE HYDROCHLORIDE 100 MG: 50 TABLET ORAL at 05:11

## 2022-11-18 RX ADMIN — LEVOTHYROXINE SODIUM 25 MCG: 25 TABLET ORAL at 05:11

## 2022-11-18 RX ADMIN — ATOVAQUONE 1500 MG: 750 SUSPENSION ORAL at 09:11

## 2022-11-18 RX ADMIN — SODIUM ZIRCONIUM CYCLOSILICATE 5 G: 5 POWDER, FOR SUSPENSION ORAL at 02:11

## 2022-11-18 RX ADMIN — HYDRALAZINE HYDROCHLORIDE 100 MG: 50 TABLET ORAL at 09:11

## 2022-11-18 RX ADMIN — NYSTATIN 500000 UNITS: 500000 SUSPENSION ORAL at 09:11

## 2022-11-18 RX ADMIN — CARVEDILOL 25 MG: 25 TABLET, FILM COATED ORAL at 09:11

## 2022-11-18 RX ADMIN — PANTOPRAZOLE SODIUM 40 MG: 40 INJECTION, POWDER, FOR SOLUTION INTRAVENOUS at 09:11

## 2022-11-18 RX ADMIN — PREDNISONE 25 MG: 5 TABLET ORAL at 09:11

## 2022-11-18 RX ADMIN — NYSTATIN 500000 UNITS: 500000 SUSPENSION ORAL at 01:11

## 2022-11-18 NOTE — PLAN OF CARE
11/18/22 1445   Post-Acute Status   Post-Acute Authorization Placement   Post-Acute Placement Status Referrals Sent   Diaylsis Status Set-up Complete/Auth obtained     SW faxed referral to SNF for PHN via [facility] for review to the following:    [facility] Phone: [phone]    [facility] Phone: [phone]   [facility] Phone: [phone]    [facility] Phone: [phone]    [facility] Phone: [phone]    [facility] Phone: [phone]    [facility] Phone: [phone]    [facility] Phone: [phone]    [facility] Phone: [phone]    [facility] Phone: [phone]    [facility] Phone: [phone]    [facility] Phone: [phone]    SW will continue to follow.        3:54 PM   [facility] Phone: [phone]  -Yes, willing to accept patient    [facility] Phone: [phone] - Willing to accept Patient.        Irene Ribera LMSW  PRN - [facility]  [id number]           SW will continue to follow patient.

## 2022-11-18 NOTE — ASSESSMENT & PLAN NOTE
11/17 Nephrology following for HD and  monitoring for renal recovery with some  improvement in UOP in last 2 days. will need PermCath and HD chair set up if no renal  recovery.     11/24: Still following to determine need for HD although anuria has resolved

## 2022-11-18 NOTE — ASSESSMENT & PLAN NOTE
SLP following  MBSS showing global weakness in swallowing as well as aspiration with thin liquids.   ENT consulted but patient did not tolerate laryngoscopy     Plan   - Discussed case with Rheumatology team, they are concerned that this dysphagia may have been from prior stroke, requesting imaging for evaluation-  CT demonstrated no acute findings or causes for dysphagia NOR did MRI   - removed NGT and place dobhoff which is more comfortable and makes swallowing easier compared to NGT.    - continue working with speech therapy   -exam concerning for thrush; nystatin swish and swallow initiated; GI consulted as concern that oropharyngeal candidiasis may be contributing to dysphagia  -Per GI, Lower suspicion for esophageal candidiasis without odynophagia however can If white plaques have been seen on oropharynx, ok to start fluconazole empirically for possible esophageal candidiasis  -EGD on 11/14 without abnormality; per GI, Suspect some aspect of esophageal dysmotility in setting of critical illness. No further testing at this time.  -diet initiated per SLP on 11/16; NG tube removed  -continue to monitor p.o. intake closely  11/17 SLP recs Mechanical soft, Liquid Diet Level: Thin   11/22 advanced to renal diet

## 2022-11-18 NOTE — ASSESSMENT & PLAN NOTE
Has hyponatremia,started on NS. was given 2 grams of sodium chloride tablets TID for likely SIADH  11/18  sodium normalized at 137. salt tablets discontinued.

## 2022-11-18 NOTE — ASSESSMENT & PLAN NOTE
Due to microscopic polyangiitis. Remains on hemodialysis intermittently.   - Baseline creatinine 1.0-1.2.   - New onset proteinuria/hematuria.   - Renal biopsy completed and   - Trialysis in place for intermittent Hemodialysis    Plan  - Nephrology consulted, appreciate management  - management of MPA as noted separately  - Renal function panel daily  - renally dose all medications  - intermittent Hemodialysis as indicated, per Nephrology   11/17     Recent Labs   Lab 11/21/22  1059 11/22/22  0521 11/23/22  0507   * 117* 82*   CREATININE 10.1* 10.3* 8.3*     . Nephrology following for HD and  monitoring for renal recovery with some  improvement in UOP in last 2 days. will need PermCath and HD chair set up if no renal  recovery.  likely SNF pending nephro decision regarding HD   11/20  mL /24h. BUN/CR trending up to 113/9.8 , started on sodium bicarb for bicarb 19. continue lokelma 5g daily for 5.1   11/23 HD yesterday without ultrafiltration . UOP 500ml/24h   11/24: UOP 1100 cc in 24hr

## 2022-11-18 NOTE — PROGRESS NOTES
[facility] - Intensive Care ([address] Medicine  Progress Note    Patient Name: Kristin Goodman  MRN: [id number]  Patient Class: IP- Inpatient   Admission Date: 10/17/2022  Length of Stay: 32 days  Attending Physician: West Quinn MD  Primary Care Provider: Lori Hernandez MD        Subjective:     Principal Problem:Microscopic polyangiitis        HPI:  Kristin Goodman is a 77 y.o. female with a past medical history of HTN, hypothyroidism, HFpEF, and osteoarthritis of the arm who has presented to the ED for cough, SOB, and weakness. Daughter is present at the bedside. Patient presented to the ED on 9/24 with hemoptysis, CT and CXR showed high suspicion for multilobar pneumonia. Patient was discharged with a 10-day course of levofloxacin and an albuterol inhaler. Patient followed up with her PCP on 10/4 with slight improvement in symptoms and went back to work. Patient continued to have worsening symptoms and presented to the ED again on 10/14 for evaluation and no interventions were done. Patient endorses fevers over the last few days up to 102.4 F with progressive SOB. She endorses hemoptysis with moderate amount of blood, generalized weakness, productive cough, SOB, and loss of appetite. Denies chest pain, nausea, vomiting, abdominal pain, or urinary changes.    ED: hypertensive up to 219/93 and tachycardic up to 117. Oxygen saturation on 92% on RA, placed on 5L NC with sats >97%. CBC remarkable for leukocytosis of 16.03 and Hb 7.7. K 3.3. Cr 1.6, baseline ~1.0. . Troponin 0.061. COVID and flu negative. EKG NSR. CT chest with contrast pending at time of admission. Given home amlodipine and lisinopril. Given 500mL NS, IV azithromycin, cefepime and vanc.       Overview/Hospital Course:  Ms. Goodman was admitted to Hospital Medicine for management of multifocal pneumonia and acute hypoxemic respiratory failure after presenting to the ED with fevers, cough, hemoptysis, and dyspnea. She required  escalation to the Medical ICU due to abrupt decline in her respiratory status, associated with hemoptysis and requiring NIPPV. CT chest showed bilateral patchy ground glass opacities. Labs additionally were notable for acute renal failure on CKD3. Pulmonology was consulted while under the care of [facility] and felt this picture was consistent with diffuse alveolar hemorrhage.     Her workup revealed a strongly positive MPO Ab consistent with clinical picture of microscopic polyangiitis with pulmonary and renal involvement. She underwent Trialysis catheter placement 10/25/2022 in the Medical ICU. 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     Rheumatology was consulted, extensive workup revealed MITUL + 1:2560 homogenous, +dsDNA, normal complements, PR3 1.2 (slight +),  (+), cANCA + 1:80, pANCA neg, GBMAb neg, trace cryos. Due to concern for MPA, she was started on pulse dose IV methylprednisolone 1000mg for 3 days, and plasmapheresis under the guidance of Transfusion Medicine. She remains on a steroid taper and has completed PLEX. She additionally received rituximab and cyclophosphamide. OI prophylaxis was provided with atovaquone due to a sulfa allergy.     Nephrology was consulted for evaluation of acute renal failure in the setting of MPA. She did require SLED in the ICU for renal clearance and remains on intermittent hemodialysis. She is expected to continue HD once discharged.     Her acute hypoxemic respiratory failure improved and she was weaned off of supplemental oxygen. She stepped down to [facility] 10/28.     She underwent MBBS for evaluation of dysphagia, which showed global delayed initiation of swallow and aspiration with thin liquids. Due to  concern for possible prior stroke, head imaging was completed and negative for acute finding. She is NPO with NG tube. ENT was consulted for evaluation of dysphagia and cervical adenopathy.  Patient did not tolerate laryngoscopy; unable to visualize vocal cords but given her lack of hoarseness, they did not suspect vocal fold paresis/paralysis. MRI recommended by rheum to fully rule out any potential neurological cause of the patient's dysphagia; but demonstrated   remote left thalamic lacunar type infarct and punctate remote left cerebellar infarcts.  She is continuing to work with speech therapy.  EGD completed 11/14 without abnormalities.  Per GI, Suspect some aspect of esophageal dysmotility in setting of critical illness. No further testing at this time.  Per SLP, diet advanced on 11/15 and NG tube removed.    Her other chronic medical conditions including hypothyroidism, diastolic CHF, and hypertension were managed with her home medications, with dose adjustments as needed.     11/17 Hb trended dopwn to 6.6. Transfusion with 1 unit of PRBC .  Rheumatology following for steroid dosing and chemotherapeutic agents. on IV steroids due to p.o. intake issues, however can transition to p.o. when swallowing issues reliably resolved - on steroid taper - solumedrol   rituximab dose due November 17; has been . Nephrology following for HD and  monitoring for renal recovery with some  improvement in UOP in last 2 days. will need PermCath and HD chair set up if no renal  recovery.  likely SNF pending nephro decision regarding HD . No need for HD toda. Continue 2 grams of sodium chloride tablets TID for likely SIADH. SLP recs Mechanical soft, Liquid Diet Level: Thin   11/18  sodium normalized at 137. salt tablets discontinued. Hb at 8.5 s/p 1 U PRBC. UOP 400mL /24h.  K at 5 . switched to prednisone taper by nephrology .started lokelma 5 mg x 3days           Review of Systems:   Pain scale:   Constitutional:  fever,  chills,  headache, vision loss, hearing loss, weight loss, Generalized weakness, falls, loss of smell, loss of taste, poor appetite,  sore throat  Respiratory: cough, shortness of breath.   Cardiovascular: chest pain, dizziness, palpitations, orthopnea, swelling of feet, syncope  Gastrointestinal: nausea, vomiting, abdominal pain, diarrhea, black stool,  blood in stool, change in bowel habits  Genitourinary: hematuria, dysuria, urgency, frequency  Integument/Breast: rash,  pruritis  Hematologic/Lymphatic: easy bruising, lymphadenopathy  Musculoskeletal: arthralgias , myalgias, back pain, neck pain, knee pain  Neurological: confusion, seizures, tremors, slurred speech  Behavioral/Psych:  depression, anxiety, auditory or visual hallucinations     OBJECTIVE:     Physical Exam:  Body mass index is 24 kg/m².    Constitutional: Appears well-developed and well-nourished.   Head: Normocephalic and atraumatic.   Neck: Normal range of motion. Neck supple.   Cardiovascular: Normal heart rate.  Regular heart rhythm.  Pulmonary/Chest: Effort normal.   Abdominal: No distension.  No tenderness  Musculoskeletal: Normal range of motion. No edema.   Neurological: Alert and oriented to person, place, and time.   Skin: Skin is warm and dry.   Psychiatric: Normal mood and affect. Behavior is normal.                  Vital Signs  Temp: 97.5 °F (36.4 °C) (11/18/22 1510)  Pulse: 64 (11/18/22 1510)  Resp: 18 (11/18/22 1510)  BP: 129/74 (11/18/22 1510)  SpO2: 95 % (11/18/22 1807)     24 Hour VS Range    Temp:  [97.3 °F (36.3 °C)-98 °F (36.7 °C)]   Pulse:  [63-79]   Resp:  [16-18]   BP: (129-161)/(63-75)   SpO2:  [94 %-98 %]     Intake/Output Summary (Last 24 hours) at 11/18/2022 1852  Last data filed at 11/18/2022 1531  Gross per 24 hour   Intake 320 ml   Output 600 ml   Net -280 ml         I/O This Shift:  I/O this shift:  In: 320 [P.O.:320]  Out: 600 [Urine:600]    Wt Readings from Last 3 Encounters:   11/14/22 57.6 kg (127 lb)   10/14/22 68 kg  (150 lb)   10/04/22 68 kg (149 lb 14.6 oz)       I have personally reviewed the vitals and recorded Intake/Output     Laboratory/Diagnostic Data:    CBC/Anemia Labs: Coags:    Recent Labs   Lab 11/17/22  1658 11/18/22  0944 11/18/22  1608   WBC 8.51 9.71 10.98   HGB 8.5* 9.5* 8.7*   HCT 25.0* 30.0* 27.1*    234 302   MCV 88 93 91   RDW 14.8* 15.3* 15.2*    No results for input(s): PT, INR, APTT in the last 168 hours.     Chemistries: ABG:   Recent Labs   Lab 11/12/22  0631 11/13/22  0233 11/14/22  0406 11/16/22  0513 11/17/22  0234 11/18/22  0309 11/18/22  1608    138   < > 129* 133* 137 136   K 3.6 3.9   < > 4.1 4.7 5.0 4.6    97   < > 93* 97 102 101   CO2 29 28   < > 24 22* 21* 21*   BUN 45* 56*   < > 91* 90* 96* 98*   CREATININE 3.4* 4.6*   < > 7.5* 8.3* 8.2* 8.7*   CALCIUM 7.5* 7.5*   < > 7.4* 7.7* 7.9* 8.1*   PROT 4.5* 4.6*  --   --   --   --   --    BILITOT 0.5 0.6  --   --   --   --   --    ALKPHOS 58 60  --   --   --   --   --    ALT 21 19  --   --   --   --   --    AST 17 16  --   --   --   --   --    MG 1.7 1.8   < > 1.9 2.0 2.0  --    PHOS 4.4 4.9*   < > 5.7* 6.4* 6.6* 6.8*    < > = values in this interval not displayed.    No results for input(s): PH, PCO2, PO2, HCO3, POCSATURATED, BE in the last 168 hours.     POCT Glucose: HbA1c:    Recent Labs   Lab 11/17/22  0410 11/17/22  0706 11/17/22  1221 11/17/22  1534 11/17/22  2346 11/18/22  1202   POCTGLUCOSE 111* 96 174* 202* 159* 144*    Hemoglobin A1C   Date Value Ref Range Status   08/02/2022 5.5 4.0 - 5.6 % Final     Comment:     ADA Screening Guidelines:  5.7-6.4%  Consistent with prediabetes  >or=6.5%  Consistent with diabetes    High levels of fetal hemoglobin interfere with the HbA1C  assay. Heterozygous hemoglobin variants (HbS, HgC, etc)do  not significantly interfere with this assay.   However, presence of multiple variants may affect accuracy.     11/22/2021 5.4 4.0 - 5.6 % Final     Comment:     ADA Screening  Guidelines:  5.7-6.4%  Consistent with prediabetes  >or=6.5%  Consistent with diabetes    High levels of fetal hemoglobin interfere with the HbA1C  assay. Heterozygous hemoglobin variants (HbS, HgC, etc)do  not significantly interfere with this assay.   However, presence of multiple variants may affect accuracy.     01/08/2021 5.3 4.0 - 5.6 % Final     Comment:     ADA Screening Guidelines:  5.7-6.4%  Consistent with prediabetes  >or=6.5%  Consistent with diabetes  High levels of fetal hemoglobin interfere with the HbA1C  assay. Heterozygous hemoglobin variants (HbS, HgC, etc)do  not significantly interfere with this assay.   However, presence of multiple variants may affect accuracy.          Cardiac Enzymes: Ejection Fractions:    No results for input(s): CPK, CPKMB, MB, TROPONINI in the last 72 hours. EF   Date Value Ref Range Status   10/17/2022 65 % Final          No results for input(s): COLORU, APPEARANCEUA, PHUR, SPECGRAV, PROTEINUA, GLUCUA, KETONESU, BILIRUBINUA, OCCULTUA, NITRITE, UROBILINOGEN, LEUKOCYTESUR, RBCUA, WBCUA, BACTERIA, SQUAMEPITHEL, HYALINECASTS in the last 48 hours.    Invalid input(s): WRIGHTSUR    Procalcitonin (ng/mL)   Date Value   10/14/2022 0.12   09/24/2022 0.06     Lactate (Lactic Acid) (mmol/L)   Date Value   10/23/2022 0.9   10/17/2022 0.9   09/24/2022 0.7     BNP (pg/mL)   Date Value   10/23/2022 140 (H)   10/20/2022 335 (H)   10/17/2022 188 (H)   10/14/2022 281 (H)   09/24/2022 109 (H)     CRP (mg/L)   Date Value   04/06/2022 2.8     Sed Rate (mm/Hr)   Date Value   04/06/2022 54 (H)     D-Dimer (mg/L FEU)   Date Value   10/14/2022 1.96 (H)     Ferritin (ng/mL)   Date Value   10/30/2022 374 (H)     No results found for: LDH  Troponin I (ng/mL)   Date Value   10/23/2022 0.008   10/17/2022 0.052 (H)   10/17/2022 0.061 (H)   10/14/2022 <0.006   09/24/2022 0.006   04/18/2022 <0.006   08/13/2019 <0.006   04/28/2019 0.019     CPK (U/L)   Date Value   11/06/2022 92   04/18/2022 362 (H)    01/08/2021 317 (H)   04/28/2019 486 (H)   04/28/2019 484 (H)   04/27/2019 274 (H)   12/08/2016 216 (H)   07/30/2016 354 (H)     No results found for this or any previous visit.  SARS-CoV2 (COVID-19) Qualitative PCR (no units)   Date Value   10/24/2022 Not Detected   02/14/2022 Not Detected   09/25/2020 Not Detected   05/21/2020 Not Detected     SARS-CoV-2 RNA, Amplification, Qual (no units)   Date Value   10/17/2022 Negative     POC Rapid COVID (no units)   Date Value   09/24/2022 Negative       Microbiology labs for the last week  Microbiology Results (last 7 days)       ** No results found for the last 168 hours. **            Reviewed and noted in plan where applicable- Please see chart for full lab data.    Lines/Drains:  Trialysis (Dialysis) Catheter 10/25/22 2329 left internal jugular (Active)   Line Necessity Review CRRT/HD 11/13/22 2100   Verification by X-ray Yes 11/13/22 2100   Site Assessment No drainage;No swelling;No redness;No warmth 11/17/22 0945   Line Securement Device Secured with sutures 11/17/22 0945   Dressing Type Biopatch in place;Transparent (Tegaderm) 11/17/22 0945   Dressing Status Clean;Dry;Intact 11/17/22 0945   Dressing Intervention Integrity maintained 11/17/22 0945   Date on Dressing 11/09/22 11/15/22 1042   Dressing Due to be Changed 11/16/22 11/15/22 1042   Venous Patency/Care flushed w/o difficulty 11/15/22 1042   Arterial Patency/Care flushed w/o difficulty 11/15/22 1042   Distal Patency/Care flushed w/o difficulty 11/15/22 1042   Flows Good 11/11/22 1320   Waveform Not being transduced 11/10/22 1901   Number of days: 22            Midline Catheter Insertion/Assessment  - Single Lumen 10/18/22 1831 Left brachial vein 18g x 10cm (Active)   Site Assessment Clean;Dry;Intact 11/17/22 0945   IV Device Securement catheter securement device 11/17/22 0945   Line Status Saline locked 11/17/22 0945   Dressing Type Biopatch in place;Transparent (Tegaderm) 11/17/22 0945   Dressing Status  Clean;Dry;Intact 11/17/22 0945   Dressing Intervention Integrity maintained 11/17/22 0945   Dressing Change Due 11/22/22 11/15/22 2000   Site Change Due 11/15/22 11/15/22 2000   Reason Not Rotated Poor venous access 11/15/22 2000   Number of days: 29       Imaging  ECG Results              EKG 12-lead (Final result)  Result time 10/17/22 14:26:15      Final result by Interface, Lab In [facility] (10/17/22 14:26:15)               Narrative:    Test Reason : R06.02,    Vent. Rate : 093 BPM     Atrial Rate : 093 BPM     P-R Int : 126 ms          QRS Dur : 070 ms      QT Int : 356 ms       P-R-T Axes : 048 052 030 degrees     QTc Int : 442 ms    Normal sinus rhythm  Normal ECG  When compared with ECG of 14-OCT-2022 14:26,  Limb lead reversal has been corrected  Confirmed by Jc Barbosa MD ([id number]) on 10/17/2022 2:26:04 PM    Referred By: [email or web address]   SELF           Confirmed By:Jc Barbosa MD                                  Results for orders placed during the hospital encounter of 10/17/22    Echo    Interpretation Summary  · The left ventricle is normal in size with normal systolic function. The estimated ejection fraction is 65%.  · Normal right ventricular size with normal right ventricular systolic function.  · Grade I left ventricular diastolic dysfunction.  · Mild tricuspid regurgitation.  · There is pulmonary hypertension. The estimated PA systolic pressure is 56 mmHg.  · Normal to low central venous pressure (3 mmHg).      X-Ray Abdomen AP 1 View  Narrative: EXAMINATION:  XR ABDOMEN AP 1 VIEW    CLINICAL HISTORY:  NGT placement; Dysphagia, unspecified    TECHNIQUE:  AP View(s) of the abdomen was performed.    COMPARISON:  No 11/20/2022 ne    FINDINGS:  Feeding tube in the stomach.  No significant bowel dilatation.  Impression: See above    Electronically signed by: Ej Strickland MD  Date:    11/14/2022  Time:    11:02      Labs, Imaging, EKG and Diagnostic results from 11/18/2022 were  reviewed.    Medications:  Medication list was reviewed and changes noted under Assessment/Plan.  Current Facility-Administered Medications on File Prior to Encounter   Medication Dose Route Frequency Provider Last Rate Last Admin    celecoxib capsule 400 mg  400 mg Oral Once Dieudonne Marshall MD        fentaNYL 50 mcg/mL injection  mcg   mcg Intravenous PRN Dieudonne Marshall MD   100 mcg at 12/21/21 0919    LIDOcaine (PF) 10 mg/ml (1%) injection 10 mg  1 mL Intradermal Once PRN Dieudonne Marshall MD        LIDOcaine (PF) 10 mg/ml (1%) injection 10 mg  1 mL Intradermal Once Dieudonne Marshall MD        midazolam (VERSED) 1 mg/mL injection 0.5-4 mg  0.5-4 mg Intravenous PRN Dieudonne Marshall MD   2 mg at 12/21/21 0919    ropivacaine 0.2% [facility] PainPRO Pump infusion 500 ML   Perineural Continuous Dieudonne Marshall MD   New Bag at 12/21/21 1153     Current Outpatient Medications on File Prior to Encounter   Medication Sig Dispense Refill    ACETAMINOPHEN (TYLENOL ARTHRITIS PAIN ORAL) Take 1 tablet by mouth once daily.       albuterol (VENTOLIN HFA) 90 mcg/actuation inhaler Inhale 2 puffs into the lungs every 6 (six) hours as needed for Shortness of Breath. Rescue 18 g 0    amLODIPine (NORVASC) 10 MG tablet Take 1 tablet (10 mg total) by mouth once daily. 90 tablet 3    aspirin 325 MG tablet Take 1 tablet at lunch daily starting after surgery for 6 weeks to prevent DVT. 42 tablet 0    diclofenac sodium (VOLTAREN) 1 % Gel Apply 2 g topically 4 (four) times daily. for 10 days 400 g 0    epinastine 0.05 % ophthalmic solution Place 1 drop into both eyes once daily.       EUTHYROX 25 mcg tablet Take 1 tablet by mouth once daily 90 tablet 0    fish,bora,flax oils-om3,6,9no1 (OMEGA 3-6-9) 1,200 mg Cap Take 1 each by mouth once daily. 30 capsule 3    fluticasone propionate (FLONASE) 50 mcg/actuation nasal spray 1 spray (50 mcg total) by Each Nostril route 2 (two) times daily. 16 g 0    FLUZONE  HIGHDOSE QUAD 20-21  mcg/0.7 mL Syrg ADM 0.7ML IM UTD      hydrALAZINE (APRESOLINE) 100 MG tablet TAKE 1 TABLET BY MOUTH THREE TIMES DAILY 90 tablet 0    HYDROcodone-acetaminophen (NORCO) 5-325 mg per tablet Take 1 tablet by mouth every 6 (six) hours as needed.      ibuprofen (ADVIL,MOTRIN) 800 MG tablet Take 800 mg by mouth every 8 (eight) hours as needed.      levocetirizine (XYZAL) 5 MG tablet Take 1 tablet (5 mg total) by mouth every evening. For sinus 30 tablet 0    lisinopriL (PRINIVIL,ZESTRIL) 40 MG tablet Take 1 tablet by mouth once daily 90 tablet 0    meloxicam (MOBIC) 15 MG tablet Take 1 tablet (15 mg total) by mouth daily as needed (arthritis). 30 tablet 2    methocarbamoL (ROBAXIN) 500 MG Tab Take 1 tablet (500 mg total) by mouth 3 (three) times daily as needed (muscle spasms). 40 tablet 0    metoprolol succinate (TOPROL-XL) 50 MG 24 hr tablet Take 1 tablet by mouth once daily 90 tablet 0    mupirocin (BACTROBAN) 2 % ointment Apply topically 2 (two) times daily.      tiZANidine (ZANAFLEX) 4 MG tablet Take 1 tablet by mouth twice daily as needed 20 tablet 0     Scheduled Medications:  sodium chloride 0.9%, , Intravenous, Once  aluminum-magnesium hydroxide-simethicone, 30 mL, Oral, Once   And  LIDOcaine HCl 2%, 15 mL, Oral, Once  amLODIPine, 10 mg, Per NG tube, Daily  atovaquone, 1,500 mg, Per NG tube, Daily  carvediloL, 25 mg, Per NG tube, BID  hydrALAZINE, 100 mg, Per NG tube, Q8H  levothyroxine, 25 mcg, Per NG tube, Before breakfast  lisinopriL, 40 mg, Per NG tube, Daily  nystatin, 500,000 Units, Oral, QID  [START ON 11/19/2022] pantoprazole, 40 mg, Oral, BID AC  predniSONE, 25 mg, Oral, Daily   Followed by  [START ON 11/20/2022] predniSONE, 20 mg, Oral, Daily   Followed by  [START ON 12/4/2022] predniSONE, 12.5 mg, Oral, Daily   Followed by  [START ON 12/18/2022] predniSONE, 10 mg, Oral, Daily   Followed by  [START ON 1/1/2023] predniSONE, 5 mg, Oral, Daily  QUEtiapine, 25 mg, Oral, QHS  sodium  chloride 0.9% flush bag IVPB, , Intravenous, 1 time in Clinic/HOD  sodium zirconium cyclosilicate, 5 g, Oral, Daily    PRN: sodium chloride, sodium chloride, acetaminophen, albuterol-ipratropium, aluminum-magnesium hydroxide-simethicone, bisacodyL, cloNIDine, dextrose 10%, dextrose 10%, glucagon (human recombinant), glucose, glucose, heparin (porcine), heparin, porcine (PF), heparin, porcine (PF), insulin aspart U-100, melatonin, methocarbamoL, naloxone, ondansetron, prochlorperazine, simethicone, sodium chloride 0.9%, sodium chloride 0.9%, sodium chloride 0.9%, sodium chloride 0.9%, sodium chloride 0.9%  Infusions:   Estimated Creatinine Clearance: 4.6 mL/min (A) (based on SCr of 8.7 mg/dL (H)).    Assessment/Plan:      * Microscopic polyangiitis  As of 10/26/22 strongly positive MPO Ab (in contrast to borderline positive PR3), consistent with clinical picture of microscopic polyangiitis with pulmonary, and renal involvement after presenting with acute hypoxemic respiratory failure, hemoptysis, and acute renal failure.  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   3)  CHANGES SUGGESTIVE OF FOCAL ACUTE PYELONEPHRITIS.   4)  MILD-TO-MODERATE ARTERIONEPHROSCLEROSIS   - Rheumatology consulted, appreciate evaluation and recommendations     - MITUL + 1:2560 homogenous, +dsDNA, normal complements     - PR3 1.2 (slight +),  (+)     - cANCA + 1:80, pANCA neg     - GBMAb neg, trace cryos  - Transfusion Medicine consulted for apheresis  - Nephrology consulted, see separate documentation for  WILFREDO      Plan  - Steroids:    - s/p IV Solumedrol 1000 mg x3 doses. Now following PEXIVAS steroid taper. Currently on IV Solumedrol 20 mg daily.   - plan for 20mg IV daily on 11/6 for 14 days (last dose of 20mg equivalent is 11/20/2022).   - apheresis: underwent PLEX 10/26, 10/27, 10/30, 11/1, 11/2, 11/3  - rituximab every 7 days for 4 doses: Dose #1: 10/27, #2 11/3, #3 11/10; Next Rituximab 375 mg/m^2 due Nov 17th   - cyclophosphamide every 14 days for 2 doses: 10/27, 11/10  - Opportunistic Infection ppx: atovaquone 1500mg PO daily  - GI bleed prophylaxis: pantoprazole 40mg daily   11/17 Rheumatology following for steroid dosing and chemotherapeutic agents. on IV steroids due to p.o. intake issues, however can transition to p.o. when swallowing issues reliably resolved - on steroid taper - solumedrol .  rituximab dose due November 17 11/18  switched to prednisone taper by nephrology          Gastric reflux  PPI BID  Elevated HOB; feeds changed to bolus and tolerating well  Symptoms initially improved however reoccurrence on 11/13 without significant dietary changes; GI planning for EGD 11/14  TF further adjusted for smaller, more frequent bolus   s/p EGD 11/14; Normal Study      High risk medications (not anticoagulants) long-term use  as above      Anuria    11/17 Nephrology following for HD and  monitoring for renal recovery with some  improvement in UOP in last 2 days. will need PermCath and HD chair set up if no renal  recovery.     11/18 UOP 400mL /24h.      Gastrointestinal hemorrhage with melena  Starting having hemoptysis and melena with associated acute blood loss anemia earlier in hospital stay. Patient with known internal hemorrhoids, mild sigmoid diverticulosis, history of gastritis and + H pylori (2012 EGD).   - GI consulted 10/19, unable to perform EGD due to instability and respiratory failure at the time    Plan  - patient unable to take PO PPI due to NGT removal on 11/5, transition to IV PPI 40mg  pantoprazole daily, return to per NG tube once replaced  - s/p EGD 11/14; Normal Study  - PPI transitioned to BID as concern for GERD  - Monitor CBC closely for signs of recurrent bleed  11/17 Hb trended dopwn to 6.6. Transfusion with 1 unit of PRBC . continue protonix 40mg IV BID    Dysphagia  SLP following  MBSS showing global weakness in swallowing as well as aspiration with thin liquids.   ENT consulted but patient did not tolerate laryngoscopy     Plan   - Discussed case with Rheumatology team, they are concerned that this dysphagia may have been from prior stroke, requesting imaging for evaluation-  CT demonstrated no acute findings or causes for dysphagia NOR did MRI   - removed NGT and place dobhoff which is more comfortable and makes swallowing easier compared to NGT.    - continue working with speech therapy   -exam concerning for thrush; nystatin swish and swallow initiated; GI consulted as concern that oropharyngeal candidiasis may be contributing to dysphagia  -Per GI, Lower suspicion for esophageal candidiasis without odynophagia however can If white plaques have been seen on oropharynx, ok to start fluconazole empirically for possible esophageal candidiasis  -EGD on 11/14 without abnormality; per GI, Suspect some aspect of esophageal dysmotility in setting of critical illness. No further testing at this time.  -diet initiated per SLP on 11/16; NG tube removed  -continue to monitor p.o. intake closely  11/17 SLP recs Mechanical soft, Liquid Diet Level: Thin     Moderate malnutrition  Nutrition consulted. Most recent weight and BMI monitored-          Malnutrition (Moderate to Severe)  Weight Loss (Malnutrition): 7.5% in 3 months              Measurements:  Wt Readings from Last 1 Encounters:   11/14/22 57.6 kg (127 lb)   Body mass index is 24 kg/m².    Recommendations: Recommendation/Intervention: 1. As tolerated, increase TF rate (of Novasource) to 35 mL/hr  - 2. RD to monitor & follow-up.  Goals:  Meet % EEN, EPN by RD f/u date    Patient has been screened and assessed by RD. RD will follow patient.      Encounter for continuous renal replacement therapy (CRRT) for acute renal failure  Due to microscopic polyangiitis. Remains on hemodialysis intermittently.   - Baseline creatinine 1.0-1.2.   - New onset proteinuria/hematuria.   - Renal biopsy completed and   - Trialysis in place for intermittent Hemodialysis    Plan  - Nephrology consulted, appreciate management  - management of MPA as noted separately  - Renal function panel daily  - renally dose all medications  - intermittent Hemodialysis as indicated, per Nephrology   11/17     Recent Labs   Lab 11/16/22  0513 11/17/22  0234 11/18/22  0309   BUN 91* 90* 96*   CREATININE 7.5* 8.3* 8.2*     . Nephrology following for HD and  monitoring for renal recovery with some  improvement in UOP in last 2 days. will need PermCath and HD chair set up if no renal  recovery.  likely SNF pending nephro decision regarding HD     Acute hypoxemic respiratory failure  Multifocal pneumonia  Hemoptysis  Dyspnea  Diffuse Alveolar Hemorrahge  In the setting of a new diagnosis of microscopic polyangiitis, presented with fevers, dyspnea,.  - Chest imaging was consistent with diffuse alveolar hemorrhage.  CT chest wc 10/17 with bl perihilar dense consolidations w/ peripheral patcy areas of GGO, BL PNA, less c/f cardiogenic pulmonary edema.  - Patient upgraded to Medical ICU 10/23/22 with abrupt respiratory decline requiring NIPPV, improved with high dose IV steroids, plasmapheresis, emergent hemodialysis.   - Unable to perform bronchoscopy in the Medical ICU due to tenuous respiratory status  - As of 11/6, hypoxemia has significantly improved    Plan:  - weaned to room air  - continue management of underlying triggers with steroid and immunosuppressants  - incentive spirometry and respiratory hygiene  11/17 sats 92% on RA    Lymphadenopathy of head and neck  - Has bilateral  neck gland swelling with tenderness,consulted ENT  - No surgical intervention indicated   - Adenopathy likely reactive in nature   - Recommend further workup if it does not resolve within next 2 weeks     Hyponatremia  Has hyponatremia,started on NS.  11/17   - Patient with sodium   Recent Labs   Lab 11/16/22  0513 11/17/22  0234 11/18/22  0309   * 133* 137   . sodium trending up  . Continue 2 grams of sodium chloride tablets TID for likely SIADH  11/18  sodium normalized at 137. salt tablets discontinued.      Acute blood loss anemia  In the setting of GI bleed with melena  - Evaluated by GI, see GI bleed documentation  - Last transfusion 10/28, Hgb slowly trending down since then  - Hgb 6.9 on 11/5      Plan  - Monitor CBC Daily   - Treat with pRBC transfusion PRN Hgb <7  - qualifies for transfusion on 11/5 with Hgb 6.9, 1u pRBC ordered  -stable   11/17 Hb trended dopwn to 6.6. Transfusion with 1 unit of PRBC .consider GI eval    Current CBC reviewed-  Recent Labs   Lab 11/15/22  0446 11/17/22  0234 11/17/22  1658   HGB 7.1* 6.5* 8.5*       Chronic diastolic heart failure  Pulmonary Hypertension due to left heart disease  TTE (10/2022) EF 65%, G1DD  Home meds: Lisinopril, Toprol     No signs or symptoms to suggest acute exacerbation    Plan  - Active volume control with intermittent Hemodialysis per Nephrology   - Daily weights (standing if tolerated)  - Strict I/Os  - Fluid restriction 1.5 day  - Cardiac diet w/ 2 g Na restriction      Hyperkalemia    resolved    Primary hypertension  BP Readings from Last 1 Encounters:   11/18/22 (Abnormal) 161/75     - Patient is unable to tolerate PO mediations, all meds to be administered via NG tube  -Will continue to monitor blood pressure trend closely and adjust antihypertensive regimen as clinically indicated and tolerated.    amLODIPine tablet 10 mg, 10 mg, Per NG tube, Daily    carvediloL tablet 12.5 mg, 12.5 mg, Per NG tube, BID    hydrALAZINE tablet 25 mg, 25  mg, Per NG tube, Q8H    lisinopriL tablet 40 mg, 40 mg, Per NG tube, Daily      Hypothyroid  - Continue synthroid 25 mcg  - TSH 0.585 10/27/22    VTE Risk Mitigation (From admission, onward)           Ordered     heparin, porcine (PF) 100 unit/mL injection flush 500 Units  As needed (PRN)         11/17/22 1343     heparin, porcine (PF) 100 unit/mL injection flush 500 Units  As needed (PRN)         11/10/22 1430     heparin (porcine) injection 1,000 Units  As needed (PRN)         11/09/22 1601     heparin (porcine) injection 1,000 Units  As needed (PRN)         11/08/22 0854     Place sequential compression device  Until discontinued         10/25/22 1731     IP VTE HIGH RISK PATIENT  Once         10/17/22 1354     Reason for No Pharmacological VTE Prophylaxis  Once        Question:  Reasons:  Answer:  Risk of Bleeding    10/17/22 1354                    Discharge Planning   ANTHONY: 11/20/2022     Code Status: Full Code   Is the patient medically ready for discharge?: No    Reason for patient still in hospital (select all that apply): Treatment  Discharge Plan A: Skilled Nursing Facility   Discharge Delays: None known at this time              West Quinn MD  Department of Hospital Medicine   [facility] - Intensive Care ([facility]-)

## 2022-11-18 NOTE — ASSESSMENT & PLAN NOTE
"Patient with baseline creatinine of 1 as recent as August 2022 with progressive worsening beginning in early October 1.3 (10/13)->1.6 (10/17)->3.0 (10/23) despite IV fluids.  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  Patient noted to have new onset proteinuria and hematuria over the last 1 month.  Additionally, would consider ATN in the setting of suspected sepsis from multifocal pneumonia.      Concerns for glomerulonephritis given patient's current clinical picture. Initial urine microscopy demonstrating muddy brown casts with likely acanthocytes noted as well. MITUL positive, proteinase 3 ab weakly positive, MPO strongly positive. Patient s/p high-dose IV solumedrol x3 doses, now on Solumedrol 50mg qd. Underwent kidney bx 10/27. Rheumatology consulted, and patient started on Plex, Ritux/cytoxan. Given continually worsening renal function and uremic encephalopathy, patient underwent SLED without complication on 10/27. Transferred to floor on 10/29. Significantly improved mentation on 10/31. Underwent HD 11/1. Patient also with increasing hypernatremia so added FWF to tube feeds.      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 Second dose Ritux on 11/3. Next dose of cytoxan on 11/10. Will discuss with Rheum regarding maintenance dosing.      Recommendations:  - Signs of renal recovery 11/16 and continues to make urine  - No need for HD today, will continue to monitor  - Can DC salt tablets  - For hyperkalemia can initiate lokelma 5 mg daily for next three days, suspect regulation of K will get better as renal function returns  - continue solumedrol 20 mg qd until 11/20, then 15 for 14 days, then 12.5 for 14 days, then 10 for 14 days, then 7.5 for 14 days then possibly life long 5 mg. " (per PEXIVAS trial)  - strict intake and output  - renally dose medications and avoid nephrotoxins when possible  - replete electrolytes as appropriate

## 2022-11-18 NOTE — PLAN OF CARE
Patient in dialysis most of morning, returned and sat in chair for lunch. No complaints. Family at bedside requesting breathing treatment for patient, respiratory therapist notified. Wound care nurse in to see patient, triad cream applied to left side abdomen. Patient alert and oriented x4. Safety measures in place.

## 2022-11-18 NOTE — SUBJECTIVE & OBJECTIVE
Interval History: interval improvement in labs with return of urine output    Review of patient's allergies indicates:   Allergen Reactions    Ampicillin     Peaches [peach (prunus persica)] Other (See Comments)     Pt unable to state type of reaction. Information obtained from daughter who states she was informed of allergy from patient.    Penicillins      Other reaction(s): Hives    Sulfa (sulfonamide antibiotics) Rash and Hives     Current Facility-Administered Medications   Medication Frequency    0.9%  NaCl infusion (for blood administration) Q24H PRN    0.9%  NaCl infusion (for blood administration) Q24H PRN    0.9%  NaCl infusion Once    acetaminophen tablet 650 mg Q4H PRN    albuterol-ipratropium 2.5 mg-0.5 mg/3 mL nebulizer solution 3 mL Q4H PRN    aluminum-magnesium hydroxide-simethicone 200-200-20 mg/5 mL suspension 30 mL QID PRN    aluminum-magnesium hydroxide-simethicone 200-200-20 mg/5 mL suspension 30 mL Once    And    LIDOcaine HCl 2% oral solution 15 mL Once    amLODIPine tablet 10 mg Daily    atovaquone 750 mg/5 mL oral liquid 1,500 mg Daily    bisacodyL suppository 10 mg Daily PRN    carvediloL tablet 25 mg BID    cloNIDine tablet 0.1 mg Q6H PRN    dextrose 10% bolus 125 mL PRN    dextrose 10% bolus 250 mL PRN    glucagon (human recombinant) injection 1 mg PRN    glucose chewable tablet 16 g PRN    glucose chewable tablet 24 g PRN    heparin (porcine) injection 1,000 Units PRN    heparin, porcine (PF) 100 unit/mL injection flush 500 Units PRN    heparin, porcine (PF) 100 unit/mL injection flush 500 Units PRN    hydrALAZINE tablet 100 mg Q8H    insulin aspart U-100 pen 1-10 Units Q6H PRN    levothyroxine tablet 25 mcg Before breakfast    lisinopriL tablet 40 mg Daily    melatonin tablet 6 mg Nightly PRN    meperidine injection 25 mg PRN    methocarbamoL tablet 500 mg TID PRN    naloxone 0.4 mg/mL injection 0.02 mg PRN    nystatin 100,000 unit/mL suspension 500,000 Units QID    ondansetron  injection 4 mg Q8H PRN    pantoprazole injection 40 mg Q12H    predniSONE tablet 25 mg Daily    Followed by    [START ON 11/20/2022] predniSONE tablet 20 mg Daily    Followed by    [START ON 12/4/2022] predniSONE tablet 12.5 mg Daily    Followed by    [START ON 12/18/2022] predniSONE tablet 10 mg Daily    Followed by    [START ON 1/1/2023] predniSONE tablet 5 mg Daily    prochlorperazine injection Soln 5 mg Q6H PRN    QUEtiapine tablet 25 mg QHS    simethicone chewable tablet 80 mg QID PRN    sodium chloride 0.9% 250 mL flush bag 1 time in Clinic/HOD    sodium chloride 0.9% flush 10 mL PRN    sodium chloride 0.9% flush 10 mL PRN    sodium chloride 0.9% flush 10 mL PRN    sodium chloride 0.9% flush 10 mL PRN    sodium chloride 0.9% flush 5 mL PRN    sodium zirconium cyclosilicate packet 5 g Daily     Facility-Administered Medications Ordered in Other Encounters   Medication Frequency    celecoxib capsule 400 mg Once    fentaNYL 50 mcg/mL injection  mcg PRN    LIDOcaine (PF) 10 mg/ml (1%) injection 10 mg Once PRN    LIDOcaine (PF) 10 mg/ml (1%) injection 10 mg Once    midazolam (VERSED) 1 mg/mL injection 0.5-4 mg PRN    ropivacaine 0.2% [facility] PainPRO Pump infusion 500 ML Continuous       Objective:     Vital Signs (Most Recent):  Temp: 97.9 °F (36.6 °C) (11/18/22 1130)  Pulse: 63 (11/18/22 1130)  Resp: 18 (11/18/22 1130)  BP: 133/63 (11/18/22 1130)  SpO2: (!) 94 % (11/18/22 1250)  O2 Device (Oxygen Therapy): room air (11/18/22 1130)   Vital Signs (24h Range):  Temp:  [97.3 °F (36.3 °C)-98 °F (36.7 °C)] 97.9 °F (36.6 °C)  Pulse:  [63-79] 63  Resp:  [16-19] 18  SpO2:  [92 %-97 %] 94 %  BP: (133-166)/(63-75) 133/63     Weight: 57.6 kg (127 lb) (11/14/22 0958)  Body mass index is 24 kg/m².  Body surface area is 1.57 meters squared.    I/O last 3 completed shifts:  In: 203.8 [Blood:203.8]  Out: 400 [Urine:400]    Physical Exam  Vitals and nursing note reviewed.   Constitutional:       General: She is awake.       Appearance: She is well-developed. She is ill-appearing. She is not toxic-appearing or diaphoretic.   HENT:      Head: Normocephalic and atraumatic.      Right Ear: External ear normal.      Left Ear: External ear normal.      Nose:      Comments: + NGT     Mouth/Throat:      Mouth: Mucous membranes are dry.   Eyes:      General: Lids are normal. No scleral icterus.        Right eye: No discharge.         Left eye: No discharge.   Cardiovascular:      Rate and Rhythm: Normal rate and regular rhythm.   Pulmonary:      Effort: Pulmonary effort is normal.      Breath sounds: Normal breath sounds. No wheezing or rhonchi.   Abdominal:      General: Bowel sounds are normal.      Palpations: Abdomen is soft.      Tenderness: There is no abdominal tenderness.   Musculoskeletal:         General: No deformity.      Cervical back: Normal range of motion and neck supple. No rigidity or tenderness.      Right lower leg: No edema.      Left lower leg: No edema.   Skin:     General: Skin is warm and dry.   Neurological:      General: No focal deficit present.      Mental Status: She is alert and oriented to person, place, and time. Mental status is at baseline.   Psychiatric:         Attention and Perception: Attention normal.         Behavior: Behavior normal.       Significant Labs:  All labs within the past 24 hours have been reviewed.     Significant Imaging:

## 2022-11-18 NOTE — PROGRESS NOTES
[facility] ([place] [id number])  Nephrology  Progress Note    Patient Name: Kristin Goodman  MRN: [id number]  Admission Date: 10/17/2022  Hospital Length of Stay: 32 days  Attending Provider: West Quinn MD   Primary Care Physician: Lori Hernandez MD  Principal Problem:Microscopic polyangiitis    Subjective:     HPI: Kristin Goodman is a 77 year old female with hypertension, hypothyroidism, HFpEF who presents for cough, shortness of breath, and weakness.  Patient initially presented to ER on 09/24 with hemoptysis and imaging concerning for multilobar pneumonia at which time she was discharged on a 10 day course of levofloxacin.  Patient initially had some improvement but began having worsening symptoms on 10/14.  Patient describes multiple fevers and progressive shortness of breath.  She continues to have intermittent hemoptysis.  Patient with worsening leukocytosis and limited clinical improvement despite broad-spectrum antibiotics.  She remains on cefepime.  Patient is a noted to have baseline creatinine of 1 as recent as 8/2022 but progressive worsening of creatinine in early October.  On admission patient with creatinine of 1.6 (10/17) with subsequent progression and creatinine of 3.0 at time of consultation (10/23).  Nephrology consulted for acute kidney injury.      Interval History: interval improvement in labs with return of urine output    Review of patient's allergies indicates:   Allergen Reactions    Ampicillin     Peaches [peach (prunus persica)] Other (See Comments)     Pt unable to state type of reaction. Information obtained from daughter who states she was informed of allergy from patient.    Penicillins      Other reaction(s): Hives    Sulfa (sulfonamide antibiotics) Rash and Hives     Current Facility-Administered Medications   Medication Frequency    0.9%  NaCl infusion (for blood administration) Q24H PRN    0.9%  NaCl infusion (for blood administration) Q24H PRN    0.9%   NaCl infusion Once    acetaminophen tablet 650 mg Q4H PRN    albuterol-ipratropium 2.5 mg-0.5 mg/3 mL nebulizer solution 3 mL Q4H PRN    aluminum-magnesium hydroxide-simethicone 200-200-20 mg/5 mL suspension 30 mL QID PRN    aluminum-magnesium hydroxide-simethicone 200-200-20 mg/5 mL suspension 30 mL Once    And    LIDOcaine HCl 2% oral solution 15 mL Once    amLODIPine tablet 10 mg Daily    atovaquone 750 mg/5 mL oral liquid 1,500 mg Daily    bisacodyL suppository 10 mg Daily PRN    carvediloL tablet 25 mg BID    cloNIDine tablet 0.1 mg Q6H PRN    dextrose 10% bolus 125 mL PRN    dextrose 10% bolus 250 mL PRN    glucagon (human recombinant) injection 1 mg PRN    glucose chewable tablet 16 g PRN    glucose chewable tablet 24 g PRN    heparin (porcine) injection 1,000 Units PRN    heparin, porcine (PF) 100 unit/mL injection flush 500 Units PRN    heparin, porcine (PF) 100 unit/mL injection flush 500 Units PRN    hydrALAZINE tablet 100 mg Q8H    insulin aspart U-100 pen 1-10 Units Q6H PRN    levothyroxine tablet 25 mcg Before breakfast    lisinopriL tablet 40 mg Daily    melatonin tablet 6 mg Nightly PRN    meperidine injection 25 mg PRN    methocarbamoL tablet 500 mg TID PRN    naloxone 0.4 mg/mL injection 0.02 mg PRN    nystatin 100,000 unit/mL suspension 500,000 Units QID    ondansetron injection 4 mg Q8H PRN    pantoprazole injection 40 mg Q12H    predniSONE tablet 25 mg Daily    Followed by    [START ON 11/20/2022] predniSONE tablet 20 mg Daily    Followed by    [START ON 12/4/2022] predniSONE tablet 12.5 mg Daily    Followed by    [START ON 12/18/2022] predniSONE tablet 10 mg Daily    Followed by    [START ON 1/1/2023] predniSONE tablet 5 mg Daily    prochlorperazine injection Soln 5 mg Q6H PRN    QUEtiapine tablet 25 mg QHS    simethicone chewable tablet 80 mg QID PRN    sodium chloride 0.9% 250 mL flush bag 1 [facility] in [facility]    sodium chloride 0.9% flush 10 mL PRN     sodium chloride 0.9% flush 10 mL PRN    sodium chloride 0.9% flush 10 mL PRN    sodium chloride 0.9% flush 10 mL PRN    sodium chloride 0.9% flush 5 mL PRN    sodium zirconium cyclosilicate packet 5 g Daily     Facility-Administered Medications Ordered in Other Encounters   Medication Frequency    celecoxib capsule 400 mg Once    fentaNYL 50 mcg/mL injection  mcg PRN    LIDOcaine (PF) 10 mg/ml (1%) injection 10 mg Once PRN    LIDOcaine (PF) 10 mg/ml (1%) injection 10 mg Once    midazolam (VERSED) 1 mg/mL injection 0.5-4 mg PRN    ropivacaine 0.2% [facility] PainPRO Pump infusion 500 ML Continuous       Objective:     Vital Signs (Most Recent):  Temp: 97.9 °F (36.6 °C) (11/18/22 1130)  Pulse: 63 (11/18/22 1130)  Resp: 18 (11/18/22 1130)  BP: 133/63 (11/18/22 1130)  SpO2: (!) 94 % (11/18/22 1250)  O2 Device (Oxygen Therapy): room air (11/18/22 1130)   Vital Signs (24h Range):  Temp:  [97.3 °F (36.3 °C)-98 °F (36.7 °C)] 97.9 °F (36.6 °C)  Pulse:  [63-79] 63  Resp:  [16-19] 18  SpO2:  [92 %-97 %] 94 %  BP: (133-166)/(63-75) 133/63     Weight: 57.6 kg (127 lb) (11/14/22 0958)  Body mass index is 24 kg/m².  Body surface area is 1.57 meters squared.    I/O last 3 completed shifts:  In: 203.8 [Blood:203.8]  Out: 400 [Urine:400]    Physical Exam  Vitals and nursing note reviewed.   Constitutional:       General: She is awake.      Appearance: She is well-developed. She is ill-appearing. She is not toxic-appearing or diaphoretic.   HENT:      Head: Normocephalic and atraumatic.      Right Ear: External ear normal.      Left Ear: External ear normal.      Nose:      Comments: + NGT     Mouth/Throat:      Mouth: Mucous membranes are dry.   Eyes:      General: Lids are normal. No scleral icterus.        Right eye: No discharge.         Left eye: No discharge.   Cardiovascular:      Rate and Rhythm: Normal rate and regular rhythm.   Pulmonary:      Effort: Pulmonary effort is normal.      Breath sounds: Normal breath  sounds. No wheezing or rhonchi.   Abdominal:      General: Bowel sounds are normal.      Palpations: Abdomen is soft.      Tenderness: There is no abdominal tenderness.   Musculoskeletal:         General: No deformity.      Cervical back: Normal range of motion and neck supple. No rigidity or tenderness.      Right lower leg: No edema.      Left lower leg: No edema.   Skin:     General: Skin is warm and dry.   Neurological:      General: No focal deficit present.      Mental Status: She is alert and oriented to person, place, and time. Mental status is at baseline.   Psychiatric:         Attention and Perception: Attention normal.         Behavior: Behavior normal.       Significant Labs:  All labs within the past 24 hours have been reviewed.     Significant Imaging:      Assessment/Plan:     Encounter for continuous renal replacement therapy (CRRT) for acute renal failure  Patient with baseline creatinine of 1 as recent as August 2022 with progressive worsening beginning in early October 1.3 (10/13)->1.6 (10/17)->3.0 (10/23) despite IV fluids.  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  Patient noted to have new onset proteinuria and hematuria over the last 1 month.  Additionally, would consider ATN in the setting of suspected sepsis from multifocal pneumonia.      Concerns for glomerulonephritis given patient's current clinical picture. Initial urine microscopy demonstrating muddy brown casts with likely acanthocytes noted as well. MITUL positive, proteinase 3 ab weakly positive, MPO strongly positive. Patient s/p high-dose IV solumedrol x3 doses, now on Solumedrol 50mg qd. Underwent kidney bx 10/27. Rheumatology consulted, and patient started on Plex, Ritux/cytoxan. Given continually worsening renal function and uremic encephalopathy, patient underwent SLED without complication on 10/27. Transferred to floor on 10/29. Significantly improved  "mentation on 10/31. Underwent HD 11/1. Patient also with increasing hypernatremia so added FWF to tube feeds.      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 Second dose Ritux on 11/3. Next dose of cytoxan on 11/10. Will discuss with Rheum regarding maintenance dosing.      Recommendations:  - Signs of renal recovery 11/16 and continues to make urine  - No need for HD today, will continue to monitor  - Can DC salt tablets  - For hyperkalemia can initiate lokelma 5 mg daily for next three days, suspect regulation of K will get better as renal function returns  - continue solumedrol 20 mg qd until 11/20, then 15 for 14 days, then 12.5 for 14 days, then 10 for 14 days, then 7.5 for 14 days then possibly life long 5 mg. (per PEXIVAS trial)  - strict intake and output  - renally dose medications and avoid nephrotoxins when possible  - replete electrolytes as appropriate    Hyponatremia  Resolved           Thank you for your consult. I will follow-up with patient. Please contact us if you have any additional questions.    Blue Puente MD  Nephrology  [facility] ([facility]  "

## 2022-11-18 NOTE — ASSESSMENT & PLAN NOTE
BP Readings from Last 1 Encounters:   11/18/22 (Abnormal) 161/75     - Patient is unable to tolerate PO mediations, all meds to be administered via NG tube  -Will continue to monitor blood pressure trend closely and adjust antihypertensive regimen as clinically indicated and tolerated.    amLODIPine tablet 10 mg, 10 mg, Per NG tube, Daily    carvediloL tablet 12.5 mg, 12.5 mg, Per NG tube, BID    hydrALAZINE tablet 25 mg, 25 mg, Per NG tube, Q8H    lisinopriL tablet 40 mg, 40 mg, Per NG tube, Daily    11/24: NG tube has been removed and now taking medications by mouth

## 2022-11-18 NOTE — SUBJECTIVE & OBJECTIVE
Interval History: Doing well at this time. Swallowing pills without issues. Denies GI pain.     Current Facility-Administered Medications   Medication Frequency    0.9%  NaCl infusion (for blood administration) Q24H PRN    0.9%  NaCl infusion (for blood administration) Q24H PRN    0.9%  NaCl infusion Once    acetaminophen tablet 650 mg Q4H PRN    albuterol-ipratropium 2.5 mg-0.5 mg/3 mL nebulizer solution 3 mL Q4H PRN    aluminum-magnesium hydroxide-simethicone 200-200-20 mg/5 mL suspension 30 mL QID PRN    aluminum-magnesium hydroxide-simethicone 200-200-20 mg/5 mL suspension 30 mL Once    And    LIDOcaine HCl 2% oral solution 15 mL Once    amLODIPine tablet 10 mg Daily    atovaquone 750 mg/5 mL oral liquid 1,500 mg Daily    bisacodyL suppository 10 mg Daily PRN    carvediloL tablet 25 mg BID    cloNIDine tablet 0.1 mg Q6H PRN    dextrose 10% bolus 125 mL PRN    dextrose 10% bolus 250 mL PRN    glucagon (human recombinant) injection 1 mg PRN    glucose chewable tablet 16 g PRN    glucose chewable tablet 24 g PRN    heparin (porcine) injection 1,000 Units PRN    heparin, porcine (PF) 100 unit/mL injection flush 500 Units PRN    heparin, porcine (PF) 100 unit/mL injection flush 500 Units PRN    hydrALAZINE tablet 100 mg Q8H    insulin aspart U-100 pen 1-10 Units Q6H PRN    levothyroxine tablet 25 mcg Before breakfast    lisinopriL tablet 40 mg Daily    melatonin tablet 6 mg Nightly PRN    methocarbamoL tablet 500 mg TID PRN    naloxone 0.4 mg/mL injection 0.02 mg PRN    nystatin 100,000 unit/mL suspension 500,000 Units QID    ondansetron injection 4 mg Q8H PRN    pantoprazole injection 40 mg Q12H    predniSONE tablet 25 mg Daily    Followed by    [START ON 11/20/2022] predniSONE tablet 20 mg Daily    Followed by    [START ON 12/4/2022] predniSONE tablet 12.5 mg Daily    Followed by    [START ON 12/18/2022] predniSONE tablet 10 mg Daily    Followed by    [START ON 1/1/2023] predniSONE tablet 5 mg Daily     prochlorperazine injection Soln 5 mg Q6H PRN    QUEtiapine tablet 25 mg QHS    simethicone chewable tablet 80 mg QID PRN    sodium chloride 0.9% 250 mL flush bag 1 time in Clinic/HOD    sodium chloride 0.9% flush 10 mL PRN    sodium chloride 0.9% flush 10 mL PRN    sodium chloride 0.9% flush 10 mL PRN    sodium chloride 0.9% flush 10 mL PRN    sodium chloride 0.9% flush 5 mL PRN    sodium zirconium cyclosilicate packet 5 g Daily     Facility-Administered Medications Ordered in Other Encounters   Medication Frequency    celecoxib capsule 400 mg Once    fentaNYL 50 mcg/mL injection  mcg PRN    LIDOcaine (PF) 10 mg/ml (1%) injection 10 mg Once PRN    LIDOcaine (PF) 10 mg/ml (1%) injection 10 mg Once    midazolam (VERSED) 1 mg/mL injection 0.5-4 mg PRN    ropivacaine 0.2% Nimbus PainPRO Pump infusion 500 ML Continuous     Objective:     Vital Signs (Most Recent):  Temp: 97.5 °F (36.4 °C) (11/18/22 1510)  Pulse: 64 (11/18/22 1510)  Resp: 18 (11/18/22 1510)  BP: 129/74 (11/18/22 1510)  SpO2: 98 % (11/18/22 1608)  O2 Device (Oxygen Therapy): room air (11/18/22 1510) Vital Signs (24h Range):  Temp:  [97.3 °F (36.3 °C)-98 °F (36.7 °C)] 97.5 °F (36.4 °C)  Pulse:  [63-79] 64  Resp:  [16-18] 18  SpO2:  [94 %-98 %] 98 %  BP: (129-161)/(63-75) 129/74     Weight: 57.6 kg (127 lb) (11/14/22 0958)  Body mass index is 24 kg/m².  Body surface area is 1.57 meters squared.      Intake/Output Summary (Last 24 hours) at 11/18/2022 1730  Last data filed at 11/18/2022 1531  Gross per 24 hour   Intake 320 ml   Output 600 ml   Net -280 ml       Physical Exam   Constitutional: normal appearance.  Non-toxic appearance. No distress. She does not appear ill.   HENT:   Head: Normocephalic and atraumatic.   Nose: Nose normal. No rhinorrhea or nasal congestion.   Mouth/Throat: Mucous membranes are moist. No oropharyngeal exudate or posterior oropharyngeal erythema. Oropharynx is clear.   Eyes: Pupils are equal, round, and reactive to light.  Conjunctivae are normal. Right eye exhibits no discharge. Left eye exhibits no discharge. No scleral icterus.   Neck: Carotid bruit is not present.   Cardiovascular: Normal rate, regular rhythm, normal heart sounds and normal pulses. Exam reveals no gallop and no friction rub.   No murmur heard.  Pulmonary/Chest: Effort normal. No stridor. No respiratory distress. She has no wheezes. She has no rhonchi. She has no rales. She exhibits no tenderness.   Abdominal: Bowel sounds are normal. She exhibits no distension and no mass. There is no abdominal tenderness. There is no rebound and no guarding. No hernia.   Musculoskeletal:      Cervical back: Normal range of motion. No rigidity or tenderness.   Lymphadenopathy:     She has no cervical adenopathy.   Neurological: She is alert.   Skin: Skin is warm and dry. No bruising, no lesion and no rash noted. She is not diaphoretic. No erythema. No jaundice or pallor.   Vitals reviewed.    Significant Labs:  All pertinent lab results from the last 24 hours have been reviewed.    Significant Imaging:  Imaging results within the past 24 hours have been reviewed.

## 2022-11-18 NOTE — ASSESSMENT & PLAN NOTE
Starting having hemoptysis and melena with associated acute blood loss anemia earlier in hospital stay. Patient with known internal hemorrhoids, mild sigmoid diverticulosis, history of gastritis and + H pylori (2012 EGD).   - GI consulted 10/19, unable to perform EGD due to instability and respiratory failure at the time    Plan  - patient unable to take PO PPI due to NGT removal on 11/5, transition to IV PPI 40mg pantoprazole daily, return to per NG tube once replaced  - s/p EGD 11/14; Normal Study  - PPI transitioned to BID as concern for GERD  - Monitor CBC closely for signs of recurrent bleed  11/17 Hb trended dopwn to 6.6. Transfusion with 1 unit of PRBC .  111/8  continue protonix oral

## 2022-11-18 NOTE — NURSING
Patient alert and oriented x4. Up to chair for breakfast and lunch. Family at bedside. Patient transfers with assistance of 2 providers. Safety measures in place.

## 2022-11-18 NOTE — PLAN OF CARE
Problem: Adult Inpatient Plan of Care  Goal: Plan of Care Review  Outcome: Ongoing, Progressing  Goal: Patient-Specific Goal (Individualized)  Outcome: Ongoing, Progressing  Goal: Absence of Hospital-Acquired Illness or Injury  Outcome: Ongoing, Progressing  Goal: Optimal Comfort and Wellbeing  Outcome: Ongoing, Progressing  Goal: Readiness for Transition of Care  Outcome: Ongoing, Progressing     Problem: Infection  Goal: Absence of Infection Signs and Symptoms  Outcome: Ongoing, Progressing     Problem: Adjustment to Illness (Sepsis/Septic Shock)  Goal: Optimal Coping  Outcome: Ongoing, Progressing     Problem: Bleeding (Sepsis/Septic Shock)  Goal: Absence of Bleeding  Outcome: Ongoing, Progressing     Problem: Glycemic Control Impaired (Sepsis/Septic Shock)  Goal: Blood Glucose Level Within Desired Range  Outcome: Ongoing, Progressing     Problem: Infection Progression (Sepsis/Septic Shock)  Goal: Absence of Infection Signs and Symptoms  Outcome: Ongoing, Progressing     Problem: Nutrition Impaired (Sepsis/Septic Shock)  Goal: Optimal Nutrition Intake  Outcome: Ongoing, Progressing     Problem: Fluid and Electrolyte Imbalance (Acute Kidney Injury/Impairment)  Goal: Fluid and Electrolyte Balance  Outcome: Ongoing, Progressing     Problem: Oral Intake Inadequate (Acute Kidney Injury/Impairment)  Goal: Optimal Nutrition Intake  Outcome: Ongoing, Progressing     Problem: Renal Function Impairment (Acute Kidney Injury/Impairment)  Goal: Effective Renal Function  Outcome: Ongoing, Progressing     Problem: Fluid Imbalance (Pneumonia)  Goal: Fluid Balance  Outcome: Ongoing, Progressing     Problem: Infection (Pneumonia)  Goal: Resolution of Infection Signs and Symptoms  Outcome: Ongoing, Progressing     Problem: Respiratory Compromise (Pneumonia)  Goal: Effective Oxygenation and Ventilation  Outcome: Ongoing, Progressing     Problem: Fall Injury Risk  Goal: Absence of Fall and Fall-Related Injury  Outcome: Ongoing,  Progressing     Problem: Skin Injury Risk Increased  Goal: Skin Health and Integrity  Outcome: Ongoing, Progressing     Problem: Device-Related Complication Risk (CRRT (Continuous Renal Replacement Therapy))  Goal: Safe, Effective Therapy Delivery  Outcome: Ongoing, Progressing     Problem: Hypothermia (CRRT (Continuous Renal Replacement Therapy))  Goal: Body Temperature Maintained in Desired Range  Outcome: Ongoing, Progressing     Problem: Infection (CRRT (Continuous Renal Replacement Therapy))  Goal: Absence of Infection Signs and Symptoms  Outcome: Ongoing, Progressing     Problem: Device-Related Complication Risk (Hemodialysis)  Goal: Safe, Effective Therapy Delivery  Outcome: Ongoing, Progressing     Problem: Hemodynamic Instability (Hemodialysis)  Goal: Effective Tissue Perfusion  Outcome: Ongoing, Progressing     Problem: Infection (Hemodialysis)  Goal: Absence of Infection Signs and Symptoms  Outcome: Ongoing, Progressing     Problem: Impaired Wound Healing  Goal: Optimal Wound Healing  Outcome: Ongoing, Progressing

## 2022-11-18 NOTE — ASSESSMENT & PLAN NOTE
As of 10/26/22 strongly positive MPO Ab (in contrast to borderline positive PR3), consistent with clinical picture of microscopic polyangiitis with pulmonary, and renal involvement after presenting with acute hypoxemic respiratory failure, hemoptysis, and acute renal failure.  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   3)  CHANGES SUGGESTIVE OF FOCAL ACUTE PYELONEPHRITIS.   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 Now following PEXIVAS steroid taper. Currently on IV Solumedrol 20 mg daily.   - plan for 20mg IV daily on 11/6 for 14 days (last dose of 20mg equivalent is 11/20/2022).   - apheresis: underwent PLEX 10/26, 10/27, 10/30, 11/1, 11/2, 11/3  - rituximab every 7 days for 4 doses: Dose #1: 10/27, #2 11/3, #3 11/10, and #4 given 11/17  - cyclophosphamide every 14 days for 2 doses: 10/27, 11/10  - Opportunistic Infection ppx: atovaquone 1500mg PO daily  - GI bleed prophylaxis: pantoprazole 40mg daily   11/17 Rheumatology following for steroid dosing and chemotherapeutic agents. on IV steroids due to p.o. intake issues, however can transition to p.o. when swallowing issues reliably resolved - on  steroid taper - solumedrol .  rituximab dose due November 17 11/18  switched to prednisone taper by nephrology  11/24: No changes. Having good UOP but Cr still greater than 8. Main concern at this time is whether patient will need to go on dialysis or not. She did get one session two days ago. UOP is improving which is of course encouraging. Has triaylisis line still in place if needed. Nephro following

## 2022-11-18 NOTE — PROGRESS NOTES
"[facility] ([place] [id number])  Rheumatology  Progress Note    Patient Name: Kristin Goodman  MRN: [id number]  Admission Date: 10/17/2022  Hospital Length of Stay: 32 days  Code Status: Full Code   Attending Provider: West Quinn MD  Primary Care Physician: Lori Hernandez MD  Principal Problem: Microscopic polyangiitis    Subjective:     HPI: Patient is a 78 yo F with chronic diastolic heart failure, HTN, OA, who is admitted for respiratory failure. Rheumatology is consulted due to concern for vasculitis. Patient initially presented to the ED on 9/24/22 complaining of a week of cough followed by an episode of hemoptysis on 9/24 prompting the ED visit. They did a CTA which showed multifocal PNA. She was sent home with Levaquin. She followed up with PCP on 10/4/22 and was feeling better. Then she presented to the ED again on 10/14 and on 10/17 complaining of dyspnea. She had fevers in the few days leading up to admission of 102. She endorsed weakness, productive cough, dyspnea, and loss of appetite. Pt initally at 88% O2 saturation and improved to 98% on 2L NC. She was admitted for IV antibiotics. CT chest with contrast on 10/17 showed evidence of interval progression of bilateral perihilar dense consolidation with peripheral patchy areas of ground-glass opacification nonspecific as to the etiology.  Bilateral pneumonia as well as inflammatory etiologies considered.  Cardiogenic pulmonary edema considered less likely given the pattern.    On 10/19/22 she started having melena. Hb dropped to 6. She got 2 units pRBCs. GI was consulted. They noted "Colonoscopy in 2020 showed internal hemorrhoids and mild sigmoid diverticulosis. EGD in 2012 showed gastritis, biopsies positive for H. Pylori." "We considered doing EGD now, but from a pulmonary standpoint I do not think she is stable enough with the current pneumonia, therefore would recommend that we just follow the patient, treat with a PPI in case there " "is any peptic ulcer disease."    On 10/21/22 ENT was consulted due to left sided otalgia the day prior which resolved. She also was complaining of cervical adenopathy and sore throat. They did not recommend surgical intervention and felt that adenopathy was likely reactive.     On 10/23/22 ID was consulted. They recommended checking respiratory infection panel and fungal markers.    On 10/23/22 Nephrology was consulted due to worsening creatinine. They noted, "Patient is a noted to have baseline creatinine of 1 as recent as 8/2022 but progressive worsening of creatinine in early October.  On admission patient with creatinine of 1.6 (10/17) with subsequent progression and creatinine of 3.0 at time of consultation (10/23).  Nephrology consulted for acute kidney injury." "Patient with ATN nephritic picture in urine sediment, prot/crea ratio pending. Had hematuria in recent past but in context with positive urine cultures. Now definitely more dysmorphic red cells that wbcs in the urine. 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    On 10/23/22, she was transferred to ICU due to desaturations and respiratory distress. Pulmonologist noted that her respiratory status is too tenuous for bronchoscopy. She was stabilized with non-invasive ventilation and nasal cannula oxygen.      Interval History: Doing well at this time. Swallowing pills without issues. Denies GI pain.     Current Facility-Administered Medications   Medication Frequency    0.9%  NaCl infusion (for blood administration) Q24H PRN    0.9%  NaCl infusion (for blood administration) Q24H PRN    0.9%  NaCl infusion Once    acetaminophen tablet 650 mg Q4H PRN    albuterol-ipratropium 2.5 mg-0.5 mg/3 mL nebulizer solution 3 mL Q4H PRN    aluminum-magnesium hydroxide-simethicone 200-200-20 mg/5 mL suspension 30 mL QID PRN    " aluminum-magnesium hydroxide-simethicone 200-200-20 mg/5 mL suspension 30 mL Once    And    LIDOcaine HCl 2% oral solution 15 mL Once    amLODIPine tablet 10 mg Daily    atovaquone 750 mg/5 mL oral liquid 1,500 mg Daily    bisacodyL suppository 10 mg Daily PRN    carvediloL tablet 25 mg BID    cloNIDine tablet 0.1 mg Q6H PRN    dextrose 10% bolus 125 mL PRN    dextrose 10% bolus 250 mL PRN    glucagon (human recombinant) injection 1 mg PRN    glucose chewable tablet 16 g PRN    glucose chewable tablet 24 g PRN    heparin (porcine) injection 1,000 Units PRN    heparin, porcine (PF) 100 unit/mL injection flush 500 Units PRN    heparin, porcine (PF) 100 unit/mL injection flush 500 Units PRN    hydrALAZINE tablet 100 mg Q8H    insulin aspart U-100 pen 1-10 Units Q6H PRN    levothyroxine tablet 25 mcg Before breakfast    lisinopriL tablet 40 mg Daily    melatonin tablet 6 mg Nightly PRN    methocarbamoL tablet 500 mg TID PRN    naloxone 0.4 mg/mL injection 0.02 mg PRN    nystatin 100,000 unit/mL suspension 500,000 Units QID    ondansetron injection 4 mg Q8H PRN    pantoprazole injection 40 mg Q12H    predniSONE tablet 25 mg Daily    Followed by    [START ON 11/20/2022] predniSONE tablet 20 mg Daily    Followed by    [START ON 12/4/2022] predniSONE tablet 12.5 mg Daily    Followed by    [START ON 12/18/2022] predniSONE tablet 10 mg Daily    Followed by    [START ON 1/1/2023] predniSONE tablet 5 mg Daily    prochlorperazine injection Soln 5 mg Q6H PRN    QUEtiapine tablet 25 mg QHS    simethicone chewable tablet 80 mg QID PRN    sodium chloride 0.9% 250 mL flush bag 1 time in Clinic/HOD    sodium chloride 0.9% flush 10 mL PRN    sodium chloride 0.9% flush 10 mL PRN    sodium chloride 0.9% flush 10 mL PRN    sodium chloride 0.9% flush 10 mL PRN    sodium chloride 0.9% flush 5 mL PRN    sodium zirconium cyclosilicate packet 5 g Daily     Facility-Administered Medications Ordered in  Other Encounters   Medication Frequency    celecoxib capsule 400 mg Once    fentaNYL 50 mcg/mL injection  mcg PRN    LIDOcaine (PF) 10 mg/ml (1%) injection 10 mg Once PRN    LIDOcaine (PF) 10 mg/ml (1%) injection 10 mg Once    midazolam (VERSED) 1 mg/mL injection 0.5-4 mg PRN    ropivacaine 0.2% Nimbus PainPRO Pump infusion 500 ML Continuous     Objective:     Vital Signs (Most Recent):  Temp: 97.5 °F (36.4 °C) (11/18/22 1510)  Pulse: 64 (11/18/22 1510)  Resp: 18 (11/18/22 1510)  BP: 129/74 (11/18/22 1510)  SpO2: 98 % (11/18/22 1608)  O2 Device (Oxygen Therapy): room air (11/18/22 1510) Vital Signs (24h Range):  Temp:  [97.3 °F (36.3 °C)-98 °F (36.7 °C)] 97.5 °F (36.4 °C)  Pulse:  [63-79] 64  Resp:  [16-18] 18  SpO2:  [94 %-98 %] 98 %  BP: (129-161)/(63-75) 129/74     Weight: 57.6 kg (127 lb) (11/14/22 0958)  Body mass index is 24 kg/m².  Body surface area is 1.57 meters squared.      Intake/Output Summary (Last 24 hours) at 11/18/2022 1730  Last data filed at 11/18/2022 1531  Gross per 24 hour   Intake 320 ml   Output 600 ml   Net -280 ml       Physical Exam   Constitutional: normal appearance.  Non-toxic appearance. No distress. She does not appear ill.   HENT:   Head: Normocephalic and atraumatic.   Nose: Nose normal. No rhinorrhea or nasal congestion.   Mouth/Throat: Mucous membranes are moist. No oropharyngeal exudate or posterior oropharyngeal erythema. Oropharynx is clear.   Eyes: Pupils are equal, round, and reactive to light. Conjunctivae are normal. Right eye exhibits no discharge. Left eye exhibits no discharge. No scleral icterus.   Neck: Carotid bruit is not present.   Cardiovascular: Normal rate, regular rhythm, normal heart sounds and normal pulses. Exam reveals no gallop and no friction rub.   No murmur heard.  Pulmonary/Chest: Effort normal. No stridor. No respiratory distress. She has no wheezes. She has no rhonchi. She has no rales. She exhibits no tenderness.   Abdominal: Bowel sounds  are normal. She exhibits no distension and no mass. There is no abdominal tenderness. There is no rebound and no guarding. No hernia.   Musculoskeletal:      Cervical back: Normal range of motion. No rigidity or tenderness.   Lymphadenopathy:     She has no cervical adenopathy.   Neurological: She is alert.   Skin: Skin is warm and dry. No bruising, no lesion and no rash noted. She is not diaphoretic. No erythema. No jaundice or pallor.   Vitals reviewed.    Significant Labs:  All pertinent lab results from the last 24 hours have been reviewed.    Significant Imaging:  Imaging results within the past 24 hours have been reviewed.    Assessment/Plan:     Acute hypoxemic respiratory failure  Patient is a 78 yo F with chronic diastolic heart failure, HTN, OA, who is admitted for respiratory failure. Rheumatology is consulted due to concern for vasculitis. Patient presented with cough and hemoptysis since 9/24/22. She was admitted due to dyspnea and hypoxia on 10/17. She has had Hb drop to 6 this admission requiring blood transfusions. Reviewed her chest imaging which shows progressive disease from 10/14 until now which can be concerning for DAH. This might also explain the Hb drop. No bronch planned for now due to her tenuous respiratory status. S/p upper endoscopy with GI 11/14. She has had increasing creatinine from baseline of 1 to 3.0 on 10/23/22.     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   3)  CHANGES SUGGESTIVE OF FOCAL ACUTE  PYELONEPHRITIS.   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     Treatment to date  - s/p IV Solumedrol 1000 mg x3 doses. Now following PEXIVAS steroid taper. Currently on PO prednisone 25 mg  - PLEX 10/26, 10/27, 10/29, 10/30, 11/1, 11/2, 11/3 (prior to Rituxan)  - Further HD per nephrology  - Rituximab 375 mg/m^2 (600 mg) on 10/27 (every 7 day dose 1 of 4), 11/3 (dose 2 of 4), 11/10 (dose 3 of 4), 11/17 (4 of 4)  - Cyclophosphamide 750 mg/m2 on 10/27 (every 14 day dose 1 of 2), 11/10 (dose 2 of 2)    Recommendations:  1. Continue steroid taper per PEXIVAS trial as in the chart below.  2. Atovaquone 1500 mg and Protonix daily while on high dose steroids. Would change IV PPI to PO.   3. S/P Rituximab 375 mg/m^2 x 4  4. S/p Cyclophosphamide 10/27 and 11/10  5. Monitor CBC and CMP in 10-14 days after giving chemotherapy  6. Able to be discharged from a rheumatologic perspective to rehab as long as she is tolerating PO Prednisone/PPI/Atovaquone. She will need follow up with Rheumatology outpatient.                     Francisco J Ricci MD  Rheumatology  [facility] ([place]

## 2022-11-18 NOTE — ASSESSMENT & PLAN NOTE
In the setting of GI bleed with melena  - Evaluated by GI, see GI bleed documentation  - Last transfusion 10/28, Hgb slowly trending down since then  - Hgb 6.9 on 11/5      Plan  - Monitor CBC Daily   - Treat with pRBC transfusion PRN Hgb <7  - qualifies for transfusion on 11/5 with Hgb 6.9, 1u pRBC ordered  -stable   11/17 Hb trended dopwn to 6.6. Transfusion with 1 unit of PRBC .consider GI eval  11/24: Stable Hgb and vitals    Current CBC reviewed-    Recent Labs   Lab 11/21/22  0550 11/22/22  0521 11/23/22  0507   HGB 8.2* 8.7* 7.1*

## 2022-11-18 NOTE — ASSESSMENT & PLAN NOTE
Patient is a 76 yo F with chronic diastolic heart failure, HTN, OA, who is admitted for respiratory failure. Rheumatology is consulted due to concern for vasculitis. Patient presented with cough and hemoptysis since 9/24/22. She was admitted due to dyspnea and hypoxia on 10/17. She has had Hb drop to 6 this admission requiring blood transfusions. Reviewed her chest imaging which shows progressive disease from 10/14 until now which can be concerning for DAH. This might also explain the Hb drop. No bronch planned for now due to her tenuous respiratory status. S/p upper endoscopy with GI 11/14. She has had increasing creatinine from baseline of 1 to 3.0 on 10/23/22.     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   3)  CHANGES SUGGESTIVE OF FOCAL ACUTE PYELONEPHRITIS.   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     Treatment to date  - s/p IV Solumedrol 1000 mg x3 doses. Now following PEXIVAS steroid taper. Currently on PO prednisone 25 mg  - PLEX 10/26, 10/27, 10/29, 10/30, 11/1, 11/2, 11/3 (prior to Rituxan)  - Further HD per nephrology  - Rituximab 375 mg/m^2 (600 mg) on 10/27 (every 7 day dose 1 of 4), 11/3 (dose 2 of 4), 11/10 (dose 3 of 4), 11/17 (4 of 4)  - Cyclophosphamide 750 mg/m2 on 10/27 (every 14 day dose 1 of 2), 11/10 (dose 2 of 2)    Recommendations:  1. Continue steroid taper per PEXIVAS trial as in the chart below.  2. Atovaquone 1500 mg and Protonix daily while on high dose steroids. Would change IV PPI to  PO.   3. S/P Rituximab 375 mg/m^2 x 4  4. S/p Cyclophosphamide 10/27 and 11/10  5. Monitor CBC and CMP in 10-14 days after giving chemotherapy  6. Able to be discharged from a rheumatologic perspective to rehab as long as she is tolerating PO Prednisone/PPI/Atovaquone. She will need follow up with Rheumatology outpatient.

## 2022-11-19 LAB
ANION GAP SERPL CALC-SCNC: 14 MMOL/L (ref 8–16)
BASOPHILS # BLD AUTO: 0.01 K/UL (ref 0–0.2)
BASOPHILS NFR BLD: 0.1 % (ref 0–1.9)
BUN SERPL-MCNC: 102 MG/DL (ref 8–23)
CALCIUM SERPL-MCNC: 7.8 MG/DL (ref 8.7–10.5)
CHLORIDE SERPL-SCNC: 102 MMOL/L (ref 95–110)
CO2 SERPL-SCNC: 20 MMOL/L (ref 23–29)
CREAT SERPL-MCNC: 9.3 MG/DL (ref 0.5–1.4)
DIFFERENTIAL METHOD: ABNORMAL
EOSINOPHIL # BLD AUTO: 0 K/UL (ref 0–0.5)
EOSINOPHIL NFR BLD: 0 % (ref 0–8)
ERYTHROCYTE [DISTWIDTH] IN BLOOD BY AUTOMATED COUNT: 15.1 % (ref 11.5–14.5)
EST. GFR  (NO RACE VARIABLE): 4 ML/MIN/1.73 M^2
GLUCOSE SERPL-MCNC: 90 MG/DL (ref 70–110)
HCT VFR BLD AUTO: 24.1 % (ref 37–48.5)
HGB BLD-MCNC: 7.8 G/DL (ref 12–16)
IMM GRANULOCYTES # BLD AUTO: 0.22 K/UL (ref 0–0.04)
IMM GRANULOCYTES NFR BLD AUTO: 2.3 % (ref 0–0.5)
LYMPHOCYTES # BLD AUTO: 0.7 K/UL (ref 1–4.8)
LYMPHOCYTES NFR BLD: 7.4 % (ref 18–48)
MAGNESIUM SERPL-MCNC: 2 MG/DL (ref 1.6–2.6)
MCH RBC QN AUTO: 29.1 PG (ref 27–31)
MCHC RBC AUTO-ENTMCNC: 32.4 G/DL (ref 32–36)
MCV RBC AUTO: 90 FL (ref 82–98)
MONOCYTES # BLD AUTO: 0.9 K/UL (ref 0.3–1)
MONOCYTES NFR BLD: 9.7 % (ref 4–15)
NEUTROPHILS # BLD AUTO: 7.8 K/UL (ref 1.8–7.7)
NEUTROPHILS NFR BLD: 80.5 % (ref 38–73)
NRBC BLD-RTO: 0 /100 WBC
PHOSPHATE SERPL-MCNC: 6.8 MG/DL (ref 2.7–4.5)
PLATELET # BLD AUTO: 290 K/UL (ref 150–450)
PMV BLD AUTO: 11 FL (ref 9.2–12.9)
POCT GLUCOSE: 120 MG/DL (ref 70–110)
POCT GLUCOSE: 137 MG/DL (ref 70–110)
POCT GLUCOSE: 152 MG/DL (ref 70–110)
POTASSIUM SERPL-SCNC: 4.6 MMOL/L (ref 3.5–5.1)
RBC # BLD AUTO: 2.68 M/UL (ref 4–5.4)
SODIUM SERPL-SCNC: 136 MMOL/L (ref 136–145)
WBC # BLD AUTO: 9.69 K/UL (ref 3.9–12.7)

## 2022-11-19 PROCEDURE — 84100 ASSAY OF PHOSPHORUS: CPT

## 2022-11-19 PROCEDURE — 25000003 PHARM REV CODE 250: Performed by: HOSPITALIST

## 2022-11-19 PROCEDURE — 85025 COMPLETE CBC W/AUTO DIFF WBC: CPT | Performed by: STUDENT IN AN ORGANIZED HEALTH CARE EDUCATION/TRAINING PROGRAM

## 2022-11-19 PROCEDURE — 25000003 PHARM REV CODE 250: Performed by: STUDENT IN AN ORGANIZED HEALTH CARE EDUCATION/TRAINING PROGRAM

## 2022-11-19 PROCEDURE — 20600001 HC STEP DOWN PRIVATE ROOM

## 2022-11-19 PROCEDURE — 63600175 PHARM REV CODE 636 W HCPCS: Performed by: STUDENT IN AN ORGANIZED HEALTH CARE EDUCATION/TRAINING PROGRAM

## 2022-11-19 PROCEDURE — 83735 ASSAY OF MAGNESIUM: CPT

## 2022-11-19 PROCEDURE — 80048 BASIC METABOLIC PNL TOTAL CA: CPT | Performed by: STUDENT IN AN ORGANIZED HEALTH CARE EDUCATION/TRAINING PROGRAM

## 2022-11-19 PROCEDURE — 36415 COLL VENOUS BLD VENIPUNCTURE: CPT

## 2022-11-19 PROCEDURE — 97110 THERAPEUTIC EXERCISES: CPT | Mod: CQ

## 2022-11-19 PROCEDURE — 99232 PR SUBSEQUENT HOSPITAL CARE,LEVL II: ICD-10-PCS | Mod: ,,, | Performed by: HOSPITALIST

## 2022-11-19 PROCEDURE — 99232 SBSQ HOSP IP/OBS MODERATE 35: CPT | Mod: ,,, | Performed by: HOSPITALIST

## 2022-11-19 PROCEDURE — 25000003 PHARM REV CODE 250

## 2022-11-19 PROCEDURE — 94761 N-INVAS EAR/PLS OXIMETRY MLT: CPT

## 2022-11-19 RX ADMIN — AMLODIPINE BESYLATE 10 MG: 10 TABLET ORAL at 08:11

## 2022-11-19 RX ADMIN — CARVEDILOL 25 MG: 25 TABLET, FILM COATED ORAL at 08:11

## 2022-11-19 RX ADMIN — ATOVAQUONE 1500 MG: 750 SUSPENSION ORAL at 08:11

## 2022-11-19 RX ADMIN — LISINOPRIL 40 MG: 20 TABLET ORAL at 08:11

## 2022-11-19 RX ADMIN — HYDRALAZINE HYDROCHLORIDE 100 MG: 50 TABLET ORAL at 06:11

## 2022-11-19 RX ADMIN — QUETIAPINE FUMARATE 25 MG: 25 TABLET ORAL at 09:11

## 2022-11-19 RX ADMIN — NYSTATIN 500000 UNITS: 500000 SUSPENSION ORAL at 08:11

## 2022-11-19 RX ADMIN — PANTOPRAZOLE SODIUM 40 MG: 40 TABLET, DELAYED RELEASE ORAL at 03:11

## 2022-11-19 RX ADMIN — PANTOPRAZOLE SODIUM 40 MG: 40 TABLET, DELAYED RELEASE ORAL at 06:11

## 2022-11-19 RX ADMIN — SODIUM ZIRCONIUM CYCLOSILICATE 5 G: 5 POWDER, FOR SUSPENSION ORAL at 08:11

## 2022-11-19 RX ADMIN — HYDRALAZINE HYDROCHLORIDE 100 MG: 50 TABLET ORAL at 01:11

## 2022-11-19 RX ADMIN — NYSTATIN 500000 UNITS: 500000 SUSPENSION ORAL at 09:11

## 2022-11-19 RX ADMIN — LEVOTHYROXINE SODIUM 25 MCG: 25 TABLET ORAL at 06:11

## 2022-11-19 RX ADMIN — PREDNISONE 25 MG: 5 TABLET ORAL at 08:11

## 2022-11-19 RX ADMIN — NYSTATIN 500000 UNITS: 500000 SUSPENSION ORAL at 01:11

## 2022-11-19 RX ADMIN — CARVEDILOL 25 MG: 25 TABLET, FILM COATED ORAL at 09:11

## 2022-11-19 NOTE — PROGRESS NOTES
[facility] - [facility] ([address] Medicine  Progress Note    Patient Name: Kristin Goodman  MRN: [id number]  Patient Class: IP- Inpatient   Admission Date: 10/17/2022  Length of Stay: 33 days  Attending Physician: West Quinn MD  Primary Care Provider: Lori Hernandez MD        Subjective:     Principal Problem:Microscopic polyangiitis        HPI:  Kristin Goodman is a 77 y.o. female with a past medical history of HTN, hypothyroidism, HFpEF, and osteoarthritis of the arm who has presented to the ED for cough, SOB, and weakness. Daughter is present at the bedside. Patient presented to the ED on 9/24 with hemoptysis, CT and CXR showed high suspicion for multilobar pneumonia. Patient was discharged with a 10-day course of levofloxacin and an albuterol inhaler. Patient followed up with her PCP on 10/4 with slight improvement in symptoms and went back to work. Patient continued to have worsening symptoms and presented to the ED again on 10/14 for evaluation and no interventions were done. Patient endorses fevers over the last few days up to 102.4 F with progressive SOB. She endorses hemoptysis with moderate amount of blood, generalized weakness, productive cough, SOB, and loss of appetite. Denies chest pain, nausea, vomiting, abdominal pain, or urinary changes.    ED: hypertensive up to 219/93 and tachycardic up to 117. Oxygen saturation on 92% on RA, placed on 5L NC with sats >97%. CBC remarkable for leukocytosis of 16.03 and Hb 7.7. K 3.3. Cr 1.6, baseline ~1.0. . Troponin 0.061. COVID and flu negative. EKG NSR. CT chest with contrast pending at time of admission. Given home amlodipine and lisinopril. Given 500mL NS, IV azithromycin, cefepime and vanc.       Overview/Hospital Course:  Ms. Goodman was admitted to Hospital Medicine for management of multifocal pneumonia and acute hypoxemic respiratory failure after presenting to the ED with fevers, cough, hemoptysis, and dyspnea. She required  escalation to the Medical ICU due to abrupt decline in her respiratory status, associated with hemoptysis and requiring NIPPV. CT chest showed bilateral patchy ground glass opacities. Labs additionally were notable for acute renal failure on CKD3. Pulmonology was consulted while under the care of [facility] and felt this picture was consistent with diffuse alveolar hemorrhage.     Her workup revealed a strongly positive MPO Ab consistent with clinical picture of microscopic polyangiitis with pulmonary and renal involvement. She underwent Trialysis catheter placement 10/25/2022 in the Medical ICU. 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     Rheumatology was consulted, extensive workup revealed MITUL + 1:2560 homogenous, +dsDNA, normal complements, PR3 1.2 (slight +),  (+), cANCA + 1:80, pANCA neg, GBMAb neg, trace cryos. Due to concern for MPA, she was started on pulse dose IV methylprednisolone 1000mg for 3 days, and plasmapheresis under the guidance of Transfusion Medicine. She remains on a steroid taper and has completed PLEX. She additionally received rituximab and cyclophosphamide. OI prophylaxis was provided with atovaquone due to a sulfa allergy.     Nephrology was consulted for evaluation of acute renal failure in the setting of MPA. She did require SLED in the ICU for renal clearance and remains on intermittent hemodialysis. She is expected to continue HD once discharged.     Her acute hypoxemic respiratory failure improved and she was weaned off of supplemental oxygen. She stepped down to [facility] 10/28.     She underwent MBBS for evaluation of dysphagia, which showed global delayed initiation of swallow and aspiration with thin liquids. Due to  concern for possible prior stroke, head imaging was completed and negative for acute finding. She is NPO with NG tube. ENT was consulted for evaluation of dysphagia and cervical adenopathy.  Patient did not tolerate laryngoscopy; unable to visualize vocal cords but given her lack of hoarseness, they did not suspect vocal fold paresis/paralysis. MRI recommended by rheum to fully rule out any potential neurological cause of the patient's dysphagia; but demonstrated   remote left thalamic lacunar type infarct and punctate remote left cerebellar infarcts.  She is continuing to work with speech therapy.  EGD completed 11/14 without abnormalities.  Per GI, Suspect some aspect of esophageal dysmotility in setting of critical illness. No further testing at this time.  Per SLP, diet advanced on 11/15 and NG tube removed.    Her other chronic medical conditions including hypothyroidism, diastolic CHF, and hypertension were managed with her home medications, with dose adjustments as needed.     11/17 Hb trended dopwn to 6.6. Transfusion with 1 unit of PRBC .  Rheumatology following for steroid dosing and chemotherapeutic agents. on IV steroids due to p.o. intake issues, however can transition to p.o. when swallowing issues reliably resolved - on steroid taper - solumedrol   rituximab dose due November 17; has been . Nephrology following for HD and  monitoring for renal recovery with some  improvement in UOP in last 2 days. will need PermCath and HD chair set up if no renal  recovery.  likely SNF pending nephro decision regarding HD . No need for HD today. Continue 2 grams of sodium chloride tablets TID for likely SIADH. SLP recs Mechanical soft, Liquid Diet Level: Thin   11/18  sodium normalized at 137. salt tablets discontinued. Hb at 8.5 s/p 1 U PRBC. UOP 400mL /24h.  K at 5 . switched to prednisone taper by nephrology .started lokelma 5 mg x 3days  11/19  mL /24h. BUN/CR trending up to 102/9.3 ,no HD for now per  nephro          Review of Systems:   Pain scale:   Constitutional:  fever,  chills, headache, vision loss, hearing loss, weight loss, Generalized weakness, falls, loss of smell, loss of taste, poor appetite,  sore throat  Respiratory: cough, shortness of breath.   Cardiovascular: chest pain, dizziness, palpitations, orthopnea, swelling of feet, syncope  Gastrointestinal: nausea, vomiting, abdominal pain, diarrhea, black stool,  blood in stool, change in bowel habits  Genitourinary: hematuria, dysuria, urgency, frequency  Integument/Breast: rash,  pruritis  Hematologic/Lymphatic: easy bruising, lymphadenopathy  Musculoskeletal: arthralgias , myalgias, back pain, neck pain, knee pain  Neurological: confusion, seizures, tremors, slurred speech  Behavioral/Psych:  depression, anxiety, auditory or visual hallucinations     OBJECTIVE:     Physical Exam:  Body mass index is 24 kg/m².    Constitutional: Appears well-developed and well-nourished.   Head: Normocephalic and atraumatic.   Neck: Normal range of motion. Neck supple. left IJ trialysis  Cardiovascular: Normal heart rate.  Regular heart rhythm.  Pulmonary/Chest: Effort normal.   Abdominal: No distension.  No tenderness  Musculoskeletal: Normal range of motion. No edema.   Neurological: Alert and oriented to person, place, and time.   Skin: Skin is warm and dry.   Psychiatric: Normal mood and affect. Behavior is normal.                  Vital Signs  Temp: 97.7 °F (36.5 °C) (11/19/22 1125)  Pulse: 62 (11/19/22 1125)  Resp: 17 (11/19/22 1125)  BP: 135/60 (11/19/22 1125)  SpO2: 95 % (11/19/22 1355)     24 Hour VS Range    Temp:  [97.5 °F (36.4 °C)-98.2 °F (36.8 °C)]   Pulse:  [62-75]   Resp:  [17-20]   BP: (126-143)/(60-74)   SpO2:  [94 %-98 %]     Intake/Output Summary (Last 24 hours) at 11/19/2022 1449  Last data filed at 11/19/2022 1310  Gross per 24 hour   Intake 380 ml   Output 500 ml   Net -120 ml         I/O This Shift:  I/O this shift:  In: 180 [P.O.:180]  Out:  500 [Urine:500]    Wt Readings from Last 3 Encounters:   11/14/22 57.6 kg (127 lb)   10/14/22 68 kg (150 lb)   10/04/22 68 kg (149 lb 14.6 oz)       I have personally reviewed the vitals and recorded Intake/Output     Laboratory/Diagnostic Data:    CBC/Anemia Labs: Coags:    Recent Labs   Lab 11/18/22  0944 11/18/22  1608 11/19/22  0610   WBC 9.71 10.98 9.69   HGB 9.5* 8.7* 7.8*   HCT 30.0* 27.1* 24.1*    302 290   MCV 93 91 90   RDW 15.3* 15.2* 15.1*    No results for input(s): PT, INR, APTT in the last 168 hours.     Chemistries: ABG:   Recent Labs   Lab 11/13/22  0233 11/14/22  0406 11/17/22  0234 11/18/22  0309 11/18/22  1608 11/19/22  0610      < > 133* 137 136 136   K 3.9   < > 4.7 5.0 4.6 4.6   CL 97   < > 97 102 101 102   CO2 28   < > 22* 21* 21* 20*   BUN 56*   < > 90* 96* 98* 102*   CREATININE 4.6*   < > 8.3* 8.2* 8.7* 9.3*   CALCIUM 7.5*   < > 7.7* 7.9* 8.1* 7.8*   PROT 4.6*  --   --   --   --   --    BILITOT 0.6  --   --   --   --   --    ALKPHOS 60  --   --   --   --   --    ALT 19  --   --   --   --   --    AST 16  --   --   --   --   --    MG 1.8   < > 2.0 2.0  --  2.0   PHOS 4.9*   < > 6.4* 6.6* 6.8* 6.8*    < > = values in this interval not displayed.    No results for input(s): PH, PCO2, PO2, HCO3, POCSATURATED, BE in the last 168 hours.     POCT Glucose: HbA1c:    Recent Labs   Lab 11/17/22  1534 11/17/22  2346 11/18/22  1202 11/18/22  1759 11/19/22  0014 11/19/22  1124   POCTGLUCOSE 202* 159* 144* 163* 120* 137*    Hemoglobin A1C   Date Value Ref Range Status   08/02/2022 5.5 4.0 - 5.6 % Final     Comment:     ADA Screening Guidelines:  5.7-6.4%  Consistent with prediabetes  >or=6.5%  Consistent with diabetes    High levels of fetal hemoglobin interfere with the HbA1C  assay. Heterozygous hemoglobin variants (HbS, HgC, etc)do  not significantly interfere with this assay.   However, presence of multiple variants may affect accuracy.     11/22/2021 5.4 4.0 - 5.6 % Final     Comment:      ADA Screening Guidelines:  5.7-6.4%  Consistent with prediabetes  >or=6.5%  Consistent with diabetes    High levels of fetal hemoglobin interfere with the HbA1C  assay. Heterozygous hemoglobin variants (HbS, HgC, etc)do  not significantly interfere with this assay.   However, presence of multiple variants may affect accuracy.     01/08/2021 5.3 4.0 - 5.6 % Final     Comment:     ADA Screening Guidelines:  5.7-6.4%  Consistent with prediabetes  >or=6.5%  Consistent with diabetes  High levels of fetal hemoglobin interfere with the HbA1C  assay. Heterozygous hemoglobin variants (HbS, HgC, etc)do  not significantly interfere with this assay.   However, presence of multiple variants may affect accuracy.          Cardiac Enzymes: Ejection Fractions:    No results for input(s): CPK, CPKMB, MB, TROPONINI in the last 72 hours. EF   Date Value Ref Range Status   10/17/2022 65 % Final          No results for input(s): COLORU, APPEARANCEUA, PHUR, SPECGRAV, PROTEINUA, GLUCUA, KETONESU, BILIRUBINUA, OCCULTUA, NITRITE, UROBILINOGEN, LEUKOCYTESUR, RBCUA, WBCUA, BACTERIA, SQUAMEPITHEL, HYALINECASTS in the last 48 hours.    Invalid input(s): WRIGHTSUR    Procalcitonin (ng/mL)   Date Value   10/14/2022 0.12   09/24/2022 0.06     Lactate (Lactic Acid) (mmol/L)   Date Value   10/23/2022 0.9   10/17/2022 0.9   09/24/2022 0.7     BNP (pg/mL)   Date Value   10/23/2022 140 (H)   10/20/2022 335 (H)   10/17/2022 188 (H)   10/14/2022 281 (H)   09/24/2022 109 (H)     CRP (mg/L)   Date Value   04/06/2022 2.8     Sed Rate (mm/Hr)   Date Value   04/06/2022 54 (H)     D-Dimer (mg/L FEU)   Date Value   10/14/2022 1.96 (H)     Ferritin (ng/mL)   Date Value   10/30/2022 374 (H)     No results found for: LDH  Troponin I (ng/mL)   Date Value   10/23/2022 0.008   10/17/2022 0.052 (H)   10/17/2022 0.061 (H)   10/14/2022 <0.006   09/24/2022 0.006   04/18/2022 <0.006   08/13/2019 <0.006   04/28/2019 0.019     CPK (U/L)   Date Value   11/06/2022 92    04/18/2022 362 (H)   01/08/2021 317 (H)   04/28/2019 486 (H)   04/28/2019 484 (H)   04/27/2019 274 (H)   12/08/2016 216 (H)   07/30/2016 354 (H)     No results found for this or any previous visit.  SARS-CoV2 (COVID-19) Qualitative PCR (no units)   Date Value   10/24/2022 Not Detected   02/14/2022 Not Detected   09/25/2020 Not Detected   05/21/2020 Not Detected     SARS-CoV-2 RNA, Amplification, Qual (no units)   Date Value   10/17/2022 Negative     POC Rapid COVID (no units)   Date Value   09/24/2022 Negative       Microbiology labs for the last week  Microbiology Results (last 7 days)       ** No results found for the last 168 hours. **            Reviewed and noted in plan where applicable- Please see chart for full lab data.    Lines/Drains:  Trialysis (Dialysis) Catheter 10/25/22 2329 left internal jugular (Active)   Line Necessity Review CRRT/HD 11/13/22 2100   Verification by X-ray Yes 11/13/22 2100   Site Assessment No drainage;No swelling;No redness;No warmth 11/17/22 0945   Line Securement Device Secured with sutures 11/17/22 0945   Dressing Type Biopatch in place;Transparent (Tegaderm) 11/17/22 0945   Dressing Status Clean;Dry;Intact 11/17/22 0945   Dressing Intervention Integrity maintained 11/17/22 0945   Date on Dressing 11/09/22 11/15/22 1042   Dressing Due to be Changed 11/16/22 11/15/22 1042   Venous Patency/Care flushed w/o difficulty 11/15/22 1042   Arterial Patency/Care flushed w/o difficulty 11/15/22 1042   Distal Patency/Care flushed w/o difficulty 11/15/22 1042   Flows Good 11/11/22 1320   Waveform Not being transduced 11/10/22 1901   Number of days: 22            Midline Catheter Insertion/Assessment  - Single Lumen 10/18/22 1831 Left brachial vein 18g x 10cm (Active)   Site Assessment Clean;Dry;Intact 11/17/22 0945   IV Device Securement catheter securement device 11/17/22 0945   Line Status Saline locked 11/17/22 0945   Dressing Type Biopatch in place;Transparent (Tegaderm) 11/17/22 0945    Dressing Status Clean;Dry;Intact 11/17/22 0945   Dressing Intervention Integrity maintained 11/17/22 0945   Dressing Change Due 11/22/22 11/15/22 2000   Site Change Due 11/15/22 11/15/22 2000   Reason Not Rotated Poor venous access 11/15/22 2000   Number of days: 29       Imaging  ECG Results              EKG 12-lead (Final result)  Result time 10/17/22 14:26:15      Final result by Interface, Lab In [facility] (10/17/22 14:26:15)               Narrative:    Test Reason : R06.02,    Vent. Rate : 093 BPM     Atrial Rate : 093 BPM     P-R Int : 126 ms          QRS Dur : 070 ms      QT Int : 356 ms       P-R-T Axes : 048 052 030 degrees     QTc Int : 442 ms    Normal sinus rhythm  Normal ECG  When compared with ECG of 14-OCT-2022 14:26,  Limb lead reversal has been corrected  Confirmed by Jc Barbosa MD ([id number]) on 10/17/2022 2:26:04 PM    Referred By: [email or web address]   SELF           Confirmed By:Jc Barbosa MD                                  Results for orders placed during the hospital encounter of 10/17/22    Echo    Interpretation Summary  · The left ventricle is normal in size with normal systolic function. The estimated ejection fraction is 65%.  · Normal right ventricular size with normal right ventricular systolic function.  · Grade I left ventricular diastolic dysfunction.  · Mild tricuspid regurgitation.  · There is pulmonary hypertension. The estimated PA systolic pressure is 56 mmHg.  · Normal to low central venous pressure (3 mmHg).      X-Ray Abdomen AP 1 View  Narrative: EXAMINATION:  XR ABDOMEN AP 1 VIEW    CLINICAL HISTORY:  NGT placement; Dysphagia, unspecified    TECHNIQUE:  AP View(s) of the abdomen was performed.    COMPARISON:  No 11/20/2022 ne    FINDINGS:  Feeding tube in the stomach.  No significant bowel dilatation.  Impression: See above    Electronically signed by: Ej Strickland MD  Date:    11/14/2022  Time:    11:02      Labs, Imaging, EKG and Diagnostic results from 11/19/2022 were  reviewed.    Medications:  Medication list was reviewed and changes noted under Assessment/Plan.  Current Facility-Administered Medications on File Prior to Encounter   Medication Dose Route Frequency Provider Last Rate Last Admin    celecoxib capsule 400 mg  400 mg Oral Once Dieudonne Marshall MD        fentaNYL 50 mcg/mL injection  mcg   mcg Intravenous PRN Dieudonne Marshall MD   100 mcg at 12/21/21 0919    LIDOcaine (PF) 10 mg/ml (1%) injection 10 mg  1 mL Intradermal Once PRN Dieudonne Marshall MD        LIDOcaine (PF) 10 mg/ml (1%) injection 10 mg  1 mL Intradermal Once Dieudonne Marshall MD        midazolam (VERSED) 1 mg/mL injection 0.5-4 mg  0.5-4 mg Intravenous PRN Dieudonne Marshall MD   2 mg at 12/21/21 0919    ropivacaine 0.2% [facility] PainPRO Pump infusion 500 ML   Perineural Continuous Dieudonne Marshall MD   New Bag at 12/21/21 1153     Current Outpatient Medications on File Prior to Encounter   Medication Sig Dispense Refill    ACETAMINOPHEN (TYLENOL ARTHRITIS PAIN ORAL) Take 1 tablet by mouth once daily.       albuterol (VENTOLIN HFA) 90 mcg/actuation inhaler Inhale 2 puffs into the lungs every 6 (six) hours as needed for Shortness of Breath. Rescue 18 g 0    amLODIPine (NORVASC) 10 MG tablet Take 1 tablet (10 mg total) by mouth once daily. 90 tablet 3    aspirin 325 MG tablet Take 1 tablet at lunch daily starting after surgery for 6 weeks to prevent DVT. 42 tablet 0    diclofenac sodium (VOLTAREN) 1 % Gel Apply 2 g topically 4 (four) times daily. for 10 days 400 g 0    epinastine 0.05 % ophthalmic solution Place 1 drop into both eyes once daily.       EUTHYROX 25 mcg tablet Take 1 tablet by mouth once daily 90 tablet 0    fish,bora,flax oils-om3,6,9no1 (OMEGA 3-6-9) 1,200 mg Cap Take 1 each by mouth once daily. 30 capsule 3    fluticasone propionate (FLONASE) 50 mcg/actuation nasal spray 1 spray (50 mcg total) by Each Nostril route 2 (two) times daily. 16 g 0    FLUZONE  HIGHDOSE QUAD 20-21  mcg/0.7 mL Syrg ADM 0.7ML IM UTD      hydrALAZINE (APRESOLINE) 100 MG tablet TAKE 1 TABLET BY MOUTH THREE TIMES DAILY 90 tablet 0    HYDROcodone-acetaminophen (NORCO) 5-325 mg per tablet Take 1 tablet by mouth every 6 (six) hours as needed.      ibuprofen (ADVIL,MOTRIN) 800 MG tablet Take 800 mg by mouth every 8 (eight) hours as needed.      levocetirizine (XYZAL) 5 MG tablet Take 1 tablet (5 mg total) by mouth every evening. For sinus 30 tablet 0    lisinopriL (PRINIVIL,ZESTRIL) 40 MG tablet Take 1 tablet by mouth once daily 90 tablet 0    meloxicam (MOBIC) 15 MG tablet Take 1 tablet (15 mg total) by mouth daily as needed (arthritis). 30 tablet 2    methocarbamoL (ROBAXIN) 500 MG Tab Take 1 tablet (500 mg total) by mouth 3 (three) times daily as needed (muscle spasms). 40 tablet 0    metoprolol succinate (TOPROL-XL) 50 MG 24 hr tablet Take 1 tablet by mouth once daily 90 tablet 0    mupirocin (BACTROBAN) 2 % ointment Apply topically 2 (two) times daily.      tiZANidine (ZANAFLEX) 4 MG tablet Take 1 tablet by mouth twice daily as needed 20 tablet 0     Scheduled Medications:  sodium chloride 0.9%, , Intravenous, Once  aluminum-magnesium hydroxide-simethicone, 30 mL, Oral, Once   And  LIDOcaine HCl 2%, 15 mL, Oral, Once  amLODIPine, 10 mg, Per NG tube, Daily  atovaquone, 1,500 mg, Per NG tube, Daily  carvediloL, 25 mg, Per NG tube, BID  hydrALAZINE, 100 mg, Per NG tube, Q8H  levothyroxine, 25 mcg, Per NG tube, Before breakfast  lisinopriL, 40 mg, Per NG tube, Daily  nystatin, 500,000 Units, Oral, QID  pantoprazole, 40 mg, Oral, BID AC  [START ON 11/20/2022] predniSONE, 20 mg, Oral, Daily   Followed by  [START ON 12/4/2022] predniSONE, 12.5 mg, Oral, Daily   Followed by  [START ON 12/18/2022] predniSONE, 10 mg, Oral, Daily   Followed by  [START ON 1/1/2023] predniSONE, 5 mg, Oral, Daily  QUEtiapine, 25 mg, Oral, QHS  sodium chloride 0.9% flush bag IVPB, , Intravenous, 1 time in  Clinic/HOD  sodium zirconium cyclosilicate, 5 g, Oral, Daily    PRN: sodium chloride, sodium chloride, acetaminophen, albuterol-ipratropium, aluminum-magnesium hydroxide-simethicone, bisacodyL, cloNIDine, dextrose 10%, dextrose 10%, glucagon (human recombinant), glucose, glucose, heparin (porcine), heparin, porcine (PF), heparin, porcine (PF), insulin aspart U-100, melatonin, methocarbamoL, naloxone, ondansetron, prochlorperazine, simethicone, sodium chloride 0.9%, sodium chloride 0.9%, sodium chloride 0.9%, sodium chloride 0.9%, sodium chloride 0.9%  Infusions:   Estimated Creatinine Clearance: 4.3 mL/min (A) (based on SCr of 9.3 mg/dL (H)).    Assessment/Plan:      * Microscopic polyangiitis  As of 10/26/22 strongly positive MPO Ab (in contrast to borderline positive PR3), consistent with clinical picture of microscopic polyangiitis with pulmonary, and renal involvement after presenting with acute hypoxemic respiratory failure, hemoptysis, and acute renal failure.  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   3)  CHANGES SUGGESTIVE OF FOCAL ACUTE PYELONEPHRITIS.   4)  MILD-TO-MODERATE ARTERIONEPHROSCLEROSIS   - Rheumatology consulted, appreciate evaluation and recommendations     - MITUL + 1:2560 homogenous, +dsDNA, normal complements     - PR3 1.2 (slight +),  (+)     - cANCA + 1:80, pANCA neg     - GBMAb neg, trace cryos  - Transfusion Medicine consulted for apheresis  - Nephrology consulted, see separate documentation for WILFREDO      Plan  - Steroids:    - s/p IV Solumedrol 1000 mg x3  doses. Now following PEXIVAS steroid taper. Currently on IV Solumedrol 20 mg daily.   - plan for 20mg IV daily on 11/6 for 14 days (last dose of 20mg equivalent is 11/20/2022).   - apheresis: underwent PLEX 10/26, 10/27, 10/30, 11/1, 11/2, 11/3  - rituximab every 7 days for 4 doses: Dose #1: 10/27, #2 11/3, #3 11/10; Next Rituximab 375 mg/m^2 due Nov 17th   - cyclophosphamide every 14 days for 2 doses: 10/27, 11/10  - Opportunistic Infection ppx: atovaquone 1500mg PO daily  - GI bleed prophylaxis: pantoprazole 40mg daily   11/17 Rheumatology following for steroid dosing and chemotherapeutic agents. on IV steroids due to p.o. intake issues, however can transition to p.o. when swallowing issues reliably resolved - on steroid taper - solumedrol .  rituximab dose due November 17 11/18  switched to prednisone taper by nephrology          Gastric reflux  PPI BID  Elevated HOB; feeds changed to bolus and tolerating well  Symptoms initially improved however reoccurrence on 11/13 without significant dietary changes; GI planning for EGD 11/14  TF further adjusted for smaller, more frequent bolus   s/p EGD 11/14; Normal Study      High risk medications (not anticoagulants) long-term use  as above      Anuria    11/17 Nephrology following for HD and  monitoring for renal recovery with some  improvement in UOP in last 2 days. will need PermCath and HD chair set up if no renal  recovery.     11/19  mL /24h.      Gastrointestinal hemorrhage with melena  Starting having hemoptysis and melena with associated acute blood loss anemia earlier in hospital stay. Patient with known internal hemorrhoids, mild sigmoid diverticulosis, history of gastritis and + H pylori (2012 EGD).   - GI consulted 10/19, unable to perform EGD due to instability and respiratory failure at the time    Plan  - patient unable to take PO PPI due to NGT removal on 11/5, transition to IV PPI 40mg pantoprazole daily, return to per NG tube once replaced  -  s/p EGD 11/14; Normal Study  - PPI transitioned to BID as concern for GERD  - Monitor CBC closely for signs of recurrent bleed  11/17 Hb trended dopwn to 6.6. Transfusion with 1 unit of PRBC .  111/8  continue protonix oral     Dysphagia  SLP following  MBSS showing global weakness in swallowing as well as aspiration with thin liquids.   ENT consulted but patient did not tolerate laryngoscopy     Plan   - Discussed case with Rheumatology team, they are concerned that this dysphagia may have been from prior stroke, requesting imaging for evaluation-  CT demonstrated no acute findings or causes for dysphagia NOR did MRI   - removed NGT and place dobhoff which is more comfortable and makes swallowing easier compared to NGT.    - continue working with speech therapy   -exam concerning for thrush; nystatin swish and swallow initiated; GI consulted as concern that oropharyngeal candidiasis may be contributing to dysphagia  -Per GI, Lower suspicion for esophageal candidiasis without odynophagia however can If white plaques have been seen on oropharynx, ok to start fluconazole empirically for possible esophageal candidiasis  -EGD on 11/14 without abnormality; per GI, Suspect some aspect of esophageal dysmotility in setting of critical illness. No further testing at this time.  -diet initiated per SLP on 11/16; NG tube removed  -continue to monitor p.o. intake closely  11/17 SLP recs Mechanical soft, Liquid Diet Level: Thin     Moderate malnutrition  Nutrition consulted. Most recent weight and BMI monitored-          Malnutrition (Moderate to Severe)  Weight Loss (Malnutrition): 7.5% in 3 months              Measurements:  Wt Readings from Last 1 Encounters:   11/14/22 57.6 kg (127 lb)   Body mass index is 24 kg/m².    Recommendations: Recommendation/Intervention: 1. As tolerated, increase TF rate (of Novasource) to 35 mL/hr  - 2. RD to monitor & follow-up.  Goals: Meet % EEN, EPN by RD f/u date    Patient has been  screened and assessed by RD. RD will follow patient.      Encounter for continuous renal replacement therapy (CRRT) for acute renal failure  Due to microscopic polyangiitis. Remains on hemodialysis intermittently.   - Baseline creatinine 1.0-1.2.   - New onset proteinuria/hematuria.   - Renal biopsy completed and   - Trialysis in place for intermittent Hemodialysis    Plan  - Nephrology consulted, appreciate management  - management of MPA as noted separately  - Renal function panel daily  - renally dose all medications  - intermittent Hemodialysis as indicated, per Nephrology   11/17     Recent Labs   Lab 11/18/22  0309 11/18/22  1608 11/19/22  0610   BUN 96* 98* 102*   CREATININE 8.2* 8.7* 9.3*     . Nephrology following for HD and  monitoring for renal recovery with some  improvement in UOP in last 2 days. will need PermCath and HD chair set up if no renal  recovery.  likely SNF pending nephro decision regarding HD   11/19  mL /24h. BUN/CR trending up to 102/9.3     Acute hypoxemic respiratory failure  Multifocal pneumonia  Hemoptysis  Dyspnea  Diffuse Alveolar Hemorrahge  In the setting of a new diagnosis of microscopic polyangiitis, presented with fevers, dyspnea,.  - Chest imaging was consistent with diffuse alveolar hemorrhage.  CT chest wc 10/17 with bl perihilar dense consolidations w/ peripheral patcy areas of GGO, BL PNA, less c/f cardiogenic pulmonary edema.  - Patient upgraded to Medical ICU 10/23/22 with abrupt respiratory decline requiring NIPPV, improved with high dose IV steroids, plasmapheresis, emergent hemodialysis.   - Unable to perform bronchoscopy in the Medical ICU due to tenuous respiratory status  - As of 11/6, hypoxemia has significantly improved    Plan:  - weaned to room air  - continue management of underlying triggers with steroid and immunosuppressants  - incentive spirometry and respiratory hygiene  11/17 sats 92% on RA    Lymphadenopathy of head and neck  - Has bilateral neck  gland swelling with tenderness,consulted ENT  - No surgical intervention indicated   - Adenopathy likely reactive in nature   - Recommend further workup if it does not resolve within next 2 weeks     Hyponatremia  Has hyponatremia,started on NS. was given 2 grams of sodium chloride tablets TID for likely SIADH  11/18  sodium normalized at 137. salt tablets discontinued.      Acute blood loss anemia  In the setting of GI bleed with melena  - Evaluated by GI, see GI bleed documentation  - Last transfusion 10/28, Hgb slowly trending down since then  - Hgb 6.9 on 11/5      Plan  - Monitor CBC Daily   - Treat with pRBC transfusion PRN Hgb <7  - qualifies for transfusion on 11/5 with Hgb 6.9, 1u pRBC ordered  -stable   11/17 Hb trended dopwn to 6.6. Transfusion with 1 unit of PRBC .consider GI eval    Current CBC reviewed-    Recent Labs   Lab 11/18/22  0944 11/18/22  1608 11/19/22  0610   HGB 9.5* 8.7* 7.8*       Chronic diastolic heart failure  Pulmonary Hypertension due to left heart disease  TTE (10/2022) EF 65%, G1DD  Home meds: Lisinopril, Toprol     No signs or symptoms to suggest acute exacerbation    Plan  - Active volume control with intermittent Hemodialysis per Nephrology   - Daily weights (standing if tolerated)  - Strict I/Os  - Fluid restriction 1.5 day  - Cardiac diet w/ 2 g Na restriction      Hyperkalemia    resolved    Primary hypertension  BP Readings from Last 1 Encounters:   11/18/22 (Abnormal) 161/75     - Patient is unable to tolerate PO mediations, all meds to be administered via NG tube  -Will continue to monitor blood pressure trend closely and adjust antihypertensive regimen as clinically indicated and tolerated.    amLODIPine tablet 10 mg, 10 mg, Per NG tube, Daily    carvediloL tablet 12.5 mg, 12.5 mg, Per NG tube, BID    hydrALAZINE tablet 25 mg, 25 mg, Per NG tube, Q8H    lisinopriL tablet 40 mg, 40 mg, Per NG tube, Daily      Hypothyroid  - Continue synthroid 25 mcg  - TSH 0.585  10/27/22    VTE Risk Mitigation (From admission, onward)           Ordered     heparin, porcine (PF) 100 unit/mL injection flush 500 Units  As needed (PRN)         11/17/22 1343     heparin, porcine (PF) 100 unit/mL injection flush 500 Units  As needed (PRN)         11/10/22 1430     heparin (porcine) injection 1,000 Units  As needed (PRN)         11/09/22 1601     heparin (porcine) injection 1,000 Units  As needed (PRN)         11/08/22 0854     Place sequential compression device  Until discontinued         10/25/22 1731     IP VTE HIGH RISK PATIENT  Once         10/17/22 1354     Reason for No Pharmacological VTE Prophylaxis  Once        Question:  Reasons:  Answer:  Risk of Bleeding    10/17/22 1354                    Discharge Planning   ANTHONY: 11/21/2022     Code Status: Full Code   Is the patient medically ready for discharge?: No    Reason for patient still in hospital (select all that apply): Treatment  Discharge Plan A: Skilled Nursing Facility   Discharge Delays: None known at this time              West Quinn MD  Department of Hospital Medicine   [facility] - Intensive Care ([facility]

## 2022-11-19 NOTE — PROGRESS NOTES
I have personally taken the history and examined the patient and concur with the resident's note as above.  She continues to slowly improve.  She has completed her immunosuppressive therapy.  She can be discharged to rehab as soon as she is swallowing better.

## 2022-11-19 NOTE — PLAN OF CARE
Patient alert and oriented x4. Up to bedside commode and chair with 1 assist. Didn't eat much at breakfast and refused lunch. States just don't feel well today.  Moved to bed from chair after breakfast and didn't want to get back up to chair. Swallows well. Safety measures in place.

## 2022-11-19 NOTE — PT/OT/SLP PROGRESS
"Physical Therapy Treatment    Patient Name:  Kristin Goodman   MRN:  [id number]    Recommendations:     Discharge Recommendations:  nursing facility, skilled   Discharge Equipment Recommendations: bedside commode   Barriers to discharge:  Pt requires increased assistance with mobility at this time.     Assessment:     Kristin Goodman is a 77 y.o. female admitted with a medical diagnosis of Microscopic polyangiitis.  She presents with the following impairments/functional limitations:  weakness, impaired endurance, impaired functional mobility, impaired balance, gait instability, decreased lower extremity function, pain. Pt declined OOB activity on this date due to fatigue. Pt agreed to BLE exercises only. Pt tolerated exercises well, and will continue to benefit from skilled PT services to improve all deficits noted above. Resume PT POC as indicated.     Rehab Prognosis: Good; patient would benefit from acute skilled PT services to address these deficits and reach maximum level of function.    Recent Surgery: Procedure(s) (LRB):  EGD (ESOPHAGOGASTRODUODENOSCOPY) (N/A) 5 Days Post-Op    Plan:     During this hospitalization, patient to be seen 3 x/week to address the identified rehab impairments via gait training, therapeutic activities, therapeutic exercises, neuromuscular re-education and progress toward the following goals:    Plan of Care Expires:  11/30/22    Subjective     "I did not sleep well last night, and I had been sitting up in chair all morning."    Pain/Comfort:  Pain Rating 1: 0/10  Pain Rating Post-Intervention 1: 0/10      Objective:     Communicated with nursing prior to session.  Patient found supine with  (lines intact) upon PT entry to room.     General Precautions: Standard, fall   Orthopedic Precautions:N/A   Braces: N/A     Functional Mobility:  Not performed 2/2 pt politely refusing 2/2 fatigue.       AM-PAC 6 CLICK MOBILITY  Turning over in bed (including adjusting bedclothes, sheets and " blankets)?: 3  Sitting down on and standing up from a chair with arms (e.g., wheelchair, bedside commode, etc.): 2  Moving from lying on back to sitting on the side of the bed?: 3  Moving to and from a bed to a chair (including a wheelchair)?: 3  Need to walk in hospital room?: 3  Climbing 3-5 steps with a railing?: 2  Basic Mobility Total Score: 16       Treatment & Education:  -Answered all questions/concerns within PTA scope of practice.   -BLE therex 2x10 reps with light resistance applied: AP,Hip abd/add, HS,GS,QS,SLR w/ assist as needed.    Patient left supine with all lines intact, call button in reach, and nursing notified..    GOALS:   Multidisciplinary Problems       Physical Therapy Goals          Problem: Physical Therapy    Goal Priority Disciplines Outcome Goal Variances Interventions   Physical Therapy Goal     PT, PT/OT Ongoing, Progressing     Description: Goals to be met by: 2022     Patient will increase functional independence with mobility by performin. Supine to sit with contact guard assistance  2. Sit to supine with contact guard assistance  3. Sit to stand transfer with minimum assistance MET   3a. STS supvn LRAD  4. Gait  x 40 feet with minimum assistance using LRAD as needed  5. Lower extremity exercise program x10 reps per handout, with independence                        Time Tracking:     PT Received On: 22  PT Start Time: 1453     PT Stop Time: 1512  PT Total Time (min): 19 min     Billable Minutes: Therapeutic Exercise 19    Treatment Type: Treatment  PT/PTA: PTA     PTA Visit Number: 1     2022

## 2022-11-20 LAB
ALBUMIN SERPL BCP-MCNC: 2.5 G/DL (ref 3.5–5.2)
ANION GAP SERPL CALC-SCNC: 14 MMOL/L (ref 8–16)
ANION GAP SERPL CALC-SCNC: 17 MMOL/L (ref 8–16)
BASOPHILS # BLD AUTO: 0.02 K/UL (ref 0–0.2)
BASOPHILS NFR BLD: 0.2 % (ref 0–1.9)
BUN SERPL-MCNC: 112 MG/DL (ref 8–23)
BUN SERPL-MCNC: 113 MG/DL (ref 8–23)
CALCIUM SERPL-MCNC: 7.5 MG/DL (ref 8.7–10.5)
CALCIUM SERPL-MCNC: 7.9 MG/DL (ref 8.7–10.5)
CHLORIDE SERPL-SCNC: 100 MMOL/L (ref 95–110)
CHLORIDE SERPL-SCNC: 102 MMOL/L (ref 95–110)
CO2 SERPL-SCNC: 19 MMOL/L (ref 23–29)
CO2 SERPL-SCNC: 20 MMOL/L (ref 23–29)
CREAT SERPL-MCNC: 10.2 MG/DL (ref 0.5–1.4)
CREAT SERPL-MCNC: 9.8 MG/DL (ref 0.5–1.4)
DIFFERENTIAL METHOD: ABNORMAL
EOSINOPHIL # BLD AUTO: 0 K/UL (ref 0–0.5)
EOSINOPHIL NFR BLD: 0 % (ref 0–8)
ERYTHROCYTE [DISTWIDTH] IN BLOOD BY AUTOMATED COUNT: 15.1 % (ref 11.5–14.5)
EST. GFR  (NO RACE VARIABLE): 3.6 ML/MIN/1.73 M^2
EST. GFR  (NO RACE VARIABLE): 3.8 ML/MIN/1.73 M^2
GLUCOSE SERPL-MCNC: 150 MG/DL (ref 70–110)
GLUCOSE SERPL-MCNC: 83 MG/DL (ref 70–110)
HCT VFR BLD AUTO: 29.5 % (ref 37–48.5)
HGB BLD-MCNC: 9.2 G/DL (ref 12–16)
IMM GRANULOCYTES # BLD AUTO: 0.14 K/UL (ref 0–0.04)
IMM GRANULOCYTES NFR BLD AUTO: 1.5 % (ref 0–0.5)
LYMPHOCYTES # BLD AUTO: 0.6 K/UL (ref 1–4.8)
LYMPHOCYTES NFR BLD: 6.7 % (ref 18–48)
MAGNESIUM SERPL-MCNC: 2.1 MG/DL (ref 1.6–2.6)
MCH RBC QN AUTO: 29 PG (ref 27–31)
MCHC RBC AUTO-ENTMCNC: 31.2 G/DL (ref 32–36)
MCV RBC AUTO: 93 FL (ref 82–98)
MONOCYTES # BLD AUTO: 0.6 K/UL (ref 0.3–1)
MONOCYTES NFR BLD: 6.5 % (ref 4–15)
NEUTROPHILS # BLD AUTO: 7.9 K/UL (ref 1.8–7.7)
NEUTROPHILS NFR BLD: 85.1 % (ref 38–73)
NRBC BLD-RTO: 0 /100 WBC
PHOSPHATE SERPL-MCNC: 7.5 MG/DL (ref 2.7–4.5)
PHOSPHATE SERPL-MCNC: 7.7 MG/DL (ref 2.7–4.5)
PLATELET # BLD AUTO: 258 K/UL (ref 150–450)
PMV BLD AUTO: 10.7 FL (ref 9.2–12.9)
POCT GLUCOSE: 106 MG/DL (ref 70–110)
POCT GLUCOSE: 128 MG/DL (ref 70–110)
POCT GLUCOSE: 137 MG/DL (ref 70–110)
POCT GLUCOSE: 155 MG/DL (ref 70–110)
POTASSIUM SERPL-SCNC: 5 MMOL/L (ref 3.5–5.1)
POTASSIUM SERPL-SCNC: 5.1 MMOL/L (ref 3.5–5.1)
RBC # BLD AUTO: 3.17 M/UL (ref 4–5.4)
SODIUM SERPL-SCNC: 135 MMOL/L (ref 136–145)
SODIUM SERPL-SCNC: 137 MMOL/L (ref 136–145)
WBC # BLD AUTO: 9.27 K/UL (ref 3.9–12.7)

## 2022-11-20 PROCEDURE — 99232 SBSQ HOSP IP/OBS MODERATE 35: CPT | Mod: ,,, | Performed by: INTERNAL MEDICINE

## 2022-11-20 PROCEDURE — 25000003 PHARM REV CODE 250: Performed by: HOSPITALIST

## 2022-11-20 PROCEDURE — 25000003 PHARM REV CODE 250: Performed by: STUDENT IN AN ORGANIZED HEALTH CARE EDUCATION/TRAINING PROGRAM

## 2022-11-20 PROCEDURE — 97530 THERAPEUTIC ACTIVITIES: CPT

## 2022-11-20 PROCEDURE — 80069 RENAL FUNCTION PANEL: CPT | Performed by: HOSPITALIST

## 2022-11-20 PROCEDURE — 36415 COLL VENOUS BLD VENIPUNCTURE: CPT | Performed by: HOSPITALIST

## 2022-11-20 PROCEDURE — 94761 N-INVAS EAR/PLS OXIMETRY MLT: CPT

## 2022-11-20 PROCEDURE — 97535 SELF CARE MNGMENT TRAINING: CPT

## 2022-11-20 PROCEDURE — 36415 COLL VENOUS BLD VENIPUNCTURE: CPT

## 2022-11-20 PROCEDURE — 99232 PR SUBSEQUENT HOSPITAL CARE,LEVL II: ICD-10-PCS | Mod: ,,, | Performed by: INTERNAL MEDICINE

## 2022-11-20 PROCEDURE — 85025 COMPLETE CBC W/AUTO DIFF WBC: CPT | Performed by: HOSPITALIST

## 2022-11-20 PROCEDURE — 20600001 HC STEP DOWN PRIVATE ROOM

## 2022-11-20 PROCEDURE — 63600175 PHARM REV CODE 636 W HCPCS: Performed by: STUDENT IN AN ORGANIZED HEALTH CARE EDUCATION/TRAINING PROGRAM

## 2022-11-20 PROCEDURE — 25000003 PHARM REV CODE 250

## 2022-11-20 PROCEDURE — 83735 ASSAY OF MAGNESIUM: CPT

## 2022-11-20 PROCEDURE — 99233 SBSQ HOSP IP/OBS HIGH 50: CPT | Mod: ,,, | Performed by: HOSPITALIST

## 2022-11-20 PROCEDURE — 99233 PR SUBSEQUENT HOSPITAL CARE,LEVL III: ICD-10-PCS | Mod: ,,, | Performed by: HOSPITALIST

## 2022-11-20 PROCEDURE — 80048 BASIC METABOLIC PNL TOTAL CA: CPT | Performed by: STUDENT IN AN ORGANIZED HEALTH CARE EDUCATION/TRAINING PROGRAM

## 2022-11-20 PROCEDURE — 84100 ASSAY OF PHOSPHORUS: CPT

## 2022-11-20 PROCEDURE — 97530 THERAPEUTIC ACTIVITIES: CPT | Mod: CQ

## 2022-11-20 RX ORDER — SODIUM BICARBONATE 650 MG/1
650 TABLET ORAL 2 TIMES DAILY
Status: DISCONTINUED | OUTPATIENT
Start: 2022-11-20 | End: 2022-12-04

## 2022-11-20 RX ADMIN — LISINOPRIL 40 MG: 20 TABLET ORAL at 08:11

## 2022-11-20 RX ADMIN — CARVEDILOL 25 MG: 25 TABLET, FILM COATED ORAL at 08:11

## 2022-11-20 RX ADMIN — NYSTATIN 500000 UNITS: 500000 SUSPENSION ORAL at 08:11

## 2022-11-20 RX ADMIN — AMLODIPINE BESYLATE 10 MG: 10 TABLET ORAL at 08:11

## 2022-11-20 RX ADMIN — SODIUM ZIRCONIUM CYCLOSILICATE 5 G: 5 POWDER, FOR SUSPENSION ORAL at 10:11

## 2022-11-20 RX ADMIN — ATOVAQUONE 1500 MG: 750 SUSPENSION ORAL at 08:11

## 2022-11-20 RX ADMIN — CARVEDILOL 25 MG: 25 TABLET, FILM COATED ORAL at 09:11

## 2022-11-20 RX ADMIN — PREDNISONE 20 MG: 20 TABLET ORAL at 08:11

## 2022-11-20 RX ADMIN — QUETIAPINE FUMARATE 25 MG: 25 TABLET ORAL at 09:11

## 2022-11-20 RX ADMIN — SODIUM BICARBONATE 650 MG TABLET 650 MG: at 09:11

## 2022-11-20 RX ADMIN — PANTOPRAZOLE SODIUM 40 MG: 40 TABLET, DELAYED RELEASE ORAL at 04:11

## 2022-11-20 RX ADMIN — PANTOPRAZOLE SODIUM 40 MG: 40 TABLET, DELAYED RELEASE ORAL at 05:11

## 2022-11-20 RX ADMIN — HYDRALAZINE HYDROCHLORIDE 100 MG: 50 TABLET ORAL at 05:11

## 2022-11-20 RX ADMIN — SODIUM BICARBONATE 650 MG TABLET 650 MG: at 10:11

## 2022-11-20 RX ADMIN — NYSTATIN 500000 UNITS: 500000 SUSPENSION ORAL at 04:11

## 2022-11-20 RX ADMIN — LEVOTHYROXINE SODIUM 25 MCG: 25 TABLET ORAL at 05:11

## 2022-11-20 RX ADMIN — SODIUM ZIRCONIUM CYCLOSILICATE 5 G: 5 POWDER, FOR SUSPENSION ORAL at 08:11

## 2022-11-20 NOTE — PT/OT/SLP PROGRESS
Occupational Therapy   Treatment    Name: Kristin Goodman  MRN: [id number]  Admitting Diagnosis:  Microscopic polyangiitis  6 Days Post-Op    Recommendations:     Discharge Recommendations: nursing facility, skilled  Discharge Equipment Recommendations:  bedside commode  Barriers to discharge:  None    Assessment:     Kristin Goodman is a 77 y.o. female with a medical diagnosis of Microscopic polyangiitis.  She presents eager and willing to participate in todays treatment session. Pt initiated grooming while seated in chair, did not required (A) once setup. Pt was provided verbal instructions of todays goal / plan. Pt required Mod verbal / tactile cues for initiation of sit > stand. Mod (A) from chair required to achieve full stand (bilateral support). Pt ambulated within room using RW, pt demonstrated mild SOB, decreased foot clearance, and difficulty navigating RW. Pt would benefit from continued RW use / Oob activity with encouragement. Pt did not required rest / break during ambulation, but was instructed on deep breathing techniques. Pt provided hand putty education / strengthening. Performance deficits affecting function are weakness, impaired endurance, impaired self care skills, impaired functional mobility, gait instability, impaired balance, impaired cardiopulmonary response to activity.     Rehab Prognosis:  Good; patient would benefit from acute skilled OT services to address these deficits and reach maximum level of function.       Plan:     Patient to be seen 3 x/week to address the above listed problems via self-care/home management, neuromuscular re-education, therapeutic activities, therapeutic exercises  Plan of Care Expires: 11/20/22  Plan of Care Reviewed with: patient, daughter    Subjective     Pain/Comfort:  Pain Rating 1: 0/10    Objective:     Communicated with: nurse prior to session.  Patient found up in chair with  (No active lines) upon OT entry to room.    General Precautions: Standard, fall    Orthopedic Precautions:N/A   Braces: N/A  Respiratory Status: Room air     Occupational Performance:     Bed Mobility:    N/T    Functional Mobility/Transfers:  Patient completed Sit <> Stand Transfer with moderate assistance  with  hand-held assist   Functional Mobility: Pt ambulated 50 ft with mild SOB    Activities of Daily Living:  Grooming: stand by assistance Pt brushed teeth while seated      AMPA 6 Click ADL: 16    Treatment & Education:  Pt educated on role of OT/POC  Pt. Educated on safety precautions   Pt provided self-care/ADL re-education  Pt reviewed bed mobility/functional mobility    Patient left up in chair with all lines intact    GOALS:   Multidisciplinary Problems       Occupational Therapy Goals          Problem: Occupational Therapy    Goal Priority Disciplines Outcome Interventions   Occupational Therapy Goal     OT, PT/OT Ongoing, Progressing    Description: Goals to be met by: 11/15     Patient will increase functional independence with ADLs by performing:    UE Dressing with Modified Traverse.  LE Dressing with Modified Traverse.  Grooming while standing with Modified Traverse.  Toileting from toilet with Modified Traverse for hygiene and clothing management.   Supine to sit with Modified Traverse.  Step transfer with Modified Traverse  Toilet transfer to toilet with Modified Traverse.                         Time Tracking:     OT Date of Treatment: 11/20/22  OT Start Time: 0900  OT Stop Time: 0940  OT Total Time (min): 40 min    Billable Minutes:Self Care/Home Management 30  Therapeutic Activity 10    OT/SARAHI: OT     SARAHI Visit Number: 0    11/20/2022

## 2022-11-20 NOTE — SUBJECTIVE & OBJECTIVE
Interval History: K increased to 5.1. Patient is AAOx4 this AM and on room air. No urgent indication for dialysis     Review of patient's allergies indicates:   Allergen Reactions    Ampicillin     Peaches [peach (prunus persica)] Other (See Comments)     Pt unable to state type of reaction. Information obtained from daughter who states she was informed of allergy from patient.    Penicillins      Other reaction(s): Hives    Sulfa (sulfonamide antibiotics) Rash and Hives     Current Facility-Administered Medications   Medication Frequency    0.9%  NaCl infusion (for blood administration) Q24H PRN    0.9%  NaCl infusion (for blood administration) Q24H PRN    0.9%  NaCl infusion Once    acetaminophen tablet 650 mg Q4H PRN    albuterol-ipratropium 2.5 mg-0.5 mg/3 mL nebulizer solution 3 mL Q4H PRN    aluminum-magnesium hydroxide-simethicone 200-200-20 mg/5 mL suspension 30 mL QID PRN    aluminum-magnesium hydroxide-simethicone 200-200-20 mg/5 mL suspension 30 mL Once    And    LIDOcaine HCl 2% oral solution 15 mL Once    amLODIPine tablet 10 mg Daily    atovaquone 750 mg/5 mL oral liquid 1,500 mg Daily    bisacodyL suppository 10 mg Daily PRN    carvediloL tablet 25 mg BID    cloNIDine tablet 0.1 mg Q6H PRN    dextrose 10% bolus 125 mL PRN    dextrose 10% bolus 250 mL PRN    glucagon (human recombinant) injection 1 mg PRN    glucose chewable tablet 16 g PRN    glucose chewable tablet 24 g PRN    heparin (porcine) injection 1,000 Units PRN    heparin, porcine (PF) 100 unit/mL injection flush 500 Units PRN    heparin, porcine (PF) 100 unit/mL injection flush 500 Units PRN    hydrALAZINE tablet 100 mg Q8H    insulin aspart U-100 pen 1-10 Units Q6H PRN    levothyroxine tablet 25 mcg Before breakfast    lisinopriL tablet 40 mg Daily    melatonin tablet 6 mg Nightly PRN    methocarbamoL tablet 500 mg TID PRN    naloxone 0.4 mg/mL injection 0.02 mg PRN    nystatin 100,000 unit/mL suspension 500,000 Units QID    ondansetron  injection 4 mg Q8H PRN    pantoprazole EC tablet 40 mg BID AC    predniSONE tablet 20 mg Daily    Followed by    [START ON 12/4/2022] predniSONE tablet 12.5 mg Daily    Followed by    [START ON 12/18/2022] predniSONE tablet 10 mg Daily    Followed by    [START ON 1/1/2023] predniSONE tablet 5 mg Daily    prochlorperazine injection Soln 5 mg Q6H PRN    QUEtiapine tablet 25 mg QHS    simethicone chewable tablet 80 mg QID PRN    sodium bicarbonate tablet 650 mg BID    sodium chloride 0.9% 250 mL flush bag 1 time in Clinic/HOD    sodium chloride 0.9% flush 10 mL PRN    sodium chloride 0.9% flush 10 mL PRN    sodium chloride 0.9% flush 10 mL PRN    sodium chloride 0.9% flush 10 mL PRN    sodium chloride 0.9% flush 5 mL PRN    sodium zirconium cyclosilicate packet 5 g Daily     Facility-Administered Medications Ordered in Other Encounters   Medication Frequency    celecoxib capsule 400 mg Once    fentaNYL 50 mcg/mL injection  mcg PRN    LIDOcaine (PF) 10 mg/ml (1%) injection 10 mg Once PRN    LIDOcaine (PF) 10 mg/ml (1%) injection 10 mg Once    midazolam (VERSED) 1 mg/mL injection 0.5-4 mg PRN    ropivacaine 0.2% [facility] PainPRO Pump infusion 500 ML Continuous       Objective:     Vital Signs (Most Recent):  Temp: 97.9 °F (36.6 °C) (11/20/22 0819)  Pulse: 68 (11/20/22 0819)  Resp: 20 (11/20/22 0819)  BP: 129/60 (11/20/22 0819)  SpO2: 98 % (11/20/22 1048)  O2 Device (Oxygen Therapy): room air (11/20/22 0819) Vital Signs (24h Range):  Temp:  [97.8 °F (36.6 °C)-97.9 °F (36.6 °C)] 97.9 °F (36.6 °C)  Pulse:  [62-68] 68  Resp:  [16-20] 20  SpO2:  [93 %-98 %] 98 %  BP: (115-131)/(56-60) 129/60     Weight: 57.6 kg (127 lb) (11/14/22 0958)  Body mass index is 24 kg/m².  Body surface area is 1.57 meters squared.    I/O last 3 completed shifts:  In: 240 [P.O.:240]  Out: 500 [Urine:500]    Physical Exam  Vitals and nursing note reviewed.   Constitutional:       General: She is awake.      Appearance: She is well-developed. She  is ill-appearing. She is not toxic-appearing or diaphoretic.   HENT:      Head: Normocephalic and atraumatic.      Right Ear: External ear normal.      Left Ear: External ear normal.      Nose:      Comments: + NGT     Mouth/Throat:      Mouth: Mucous membranes are dry.   Eyes:      General: Lids are normal. No scleral icterus.        Right eye: No discharge.         Left eye: No discharge.   Cardiovascular:      Rate and Rhythm: Normal rate and regular rhythm.   Pulmonary:      Effort: Pulmonary effort is normal.      Breath sounds: Normal breath sounds. No wheezing or rhonchi.   Abdominal:      General: Bowel sounds are normal.      Palpations: Abdomen is soft.      Tenderness: There is no abdominal tenderness.   Musculoskeletal:         General: No deformity.      Cervical back: Normal range of motion and neck supple. No rigidity or tenderness.      Right lower leg: No edema.      Left lower leg: No edema.   Skin:     General: Skin is warm and dry.   Neurological:      General: No focal deficit present.      Mental Status: She is alert and oriented to person, place, and time. Mental status is at baseline.   Psychiatric:         Attention and Perception: Attention normal.         Behavior: Behavior normal.       Significant Labs:  All labs within the past 24 hours have been reviewed.     Significant Imaging:  Labs: Reviewed

## 2022-11-20 NOTE — PROGRESS NOTES
[facility] - [facility] ([address] Medicine  Progress Note    Patient Name: Kristin Goodman  MRN: [id number]  Patient Class: IP- Inpatient   Admission Date: 10/17/2022  Length of Stay: 34 days  Attending Physician: West Quinn MD  Primary Care Provider: Lori Hernandez MD        Subjective:     Principal Problem:Microscopic polyangiitis        HPI:  Kristin Goodman is a 77 y.o. female with a past medical history of HTN, hypothyroidism, HFpEF, and osteoarthritis of the arm who has presented to the ED for cough, SOB, and weakness. Daughter is present at the bedside. Patient presented to the ED on 9/24 with hemoptysis, CT and CXR showed high suspicion for multilobar pneumonia. Patient was discharged with a 10-day course of levofloxacin and an albuterol inhaler. Patient followed up with her PCP on 10/4 with slight improvement in symptoms and went back to work. Patient continued to have worsening symptoms and presented to the ED again on 10/14 for evaluation and no interventions were done. Patient endorses fevers over the last few days up to 102.4 F with progressive SOB. She endorses hemoptysis with moderate amount of blood, generalized weakness, productive cough, SOB, and loss of appetite. Denies chest pain, nausea, vomiting, abdominal pain, or urinary changes.    ED: hypertensive up to 219/93 and tachycardic up to 117. Oxygen saturation on 92% on RA, placed on 5L NC with sats >97%. CBC remarkable for leukocytosis of 16.03 and Hb 7.7. K 3.3. Cr 1.6, baseline ~1.0. . Troponin 0.061. COVID and flu negative. EKG NSR. CT chest with contrast pending at time of admission. Given home amlodipine and lisinopril. Given 500mL NS, IV azithromycin, cefepime and vanc.       Overview/Hospital Course:  Ms. Goodman was admitted to Hospital Medicine for management of multifocal pneumonia and acute hypoxemic respiratory failure after presenting to the ED with fevers, cough, hemoptysis, and dyspnea. She required  escalation to the Medical ICU due to abrupt decline in her respiratory status, associated with hemoptysis and requiring NIPPV. CT chest showed bilateral patchy ground glass opacities. Labs additionally were notable for acute renal failure on CKD3. Pulmonology was consulted while under the care of [facility] and felt this picture was consistent with diffuse alveolar hemorrhage.     Her workup revealed a strongly positive MPO Ab consistent with clinical picture of microscopic polyangiitis with pulmonary and renal involvement. She underwent Trialysis catheter placement 10/25/2022 in the Medical ICU. 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     Rheumatology was consulted, extensive workup revealed IMTUL + 1:2560 homogenous, +dsDNA, normal complements, PR3 1.2 (slight +),  (+), cANCA + 1:80, pANCA neg, GBMAb neg, trace cryos. Due to concern for MPA, she was started on pulse dose IV methylprednisolone 1000mg for 3 days, and plasmapheresis under the guidance of Transfusion Medicine. She remains on a steroid taper and has completed PLEX. She additionally received rituximab and cyclophosphamide. OI prophylaxis was provided with atovaquone due to a sulfa allergy.     Nephrology was consulted for evaluation of acute renal failure in the setting of MPA. She did require SLED in the ICU for renal clearance and remains on intermittent hemodialysis. She is expected to continue HD once discharged.     Her acute hypoxemic respiratory failure improved and she was weaned off of supplemental oxygen. She stepped down to [facility] 10/28.     She underwent MBBS for evaluation of dysphagia, which showed global delayed initiation of swallow and aspiration with thin liquids. Due to  concern for possible prior stroke, head imaging was completed and negative for acute finding. She is NPO with NG tube. ENT was consulted for evaluation of dysphagia and cervical adenopathy.  Patient did not tolerate laryngoscopy; unable to visualize vocal cords but given her lack of hoarseness, they did not suspect vocal fold paresis/paralysis. MRI recommended by rheum to fully rule out any potential neurological cause of the patient's dysphagia; but demonstrated   remote left thalamic lacunar type infarct and punctate remote left cerebellar infarcts.  She is continuing to work with speech therapy.  EGD completed 11/14 without abnormalities.  Per GI, Suspect some aspect of esophageal dysmotility in setting of critical illness. No further testing at this time.  Per SLP, diet advanced on 11/15 and NG tube removed.    Her other chronic medical conditions including hypothyroidism, diastolic CHF, and hypertension were managed with her home medications, with dose adjustments as needed.     11/17 Hb trended dopwn to 6.6. Transfusion with 1 unit of PRBC .  Rheumatology following for steroid dosing and chemotherapeutic agents. on IV steroids due to p.o. intake issues, however can transition to p.o. when swallowing issues reliably resolved - on steroid taper - solumedrol   rituximab dose due November 17; has been . Nephrology following for HD and  monitoring for renal recovery with some  improvement in UOP in last 2 days. will need PermCath and HD chair set up if no renal  recovery.  likely SNF pending nephro decision regarding HD . No need for HD today. Continue 2 grams of sodium chloride tablets TID for likely SIADH. SLP recs Mechanical soft, Liquid Diet Level: Thin   11/18  sodium normalized at 137. salt tablets discontinued. Hb at 8.5 s/p 1 U PRBC. UOP 400mL /24h.  K at 5 . switched to prednisone taper by nephrology .started lokelma 5 mg x 3days  11/19  mL /24h. BUN/CR trending up to 102/9.3 ,no HD for now per  nephro  11/20  mL /24h. BUN/CR trending up to 113/9.8 , started on sodium bicarb for bicarb 19. continue lokelma 5g daily for 5.1 . nephrology follow up  today           Review of Systems:   Pain scale:   Constitutional:  fever,  chills, headache, vision loss, hearing loss, weight loss, Generalized weakness, falls, loss of smell, loss of taste, poor appetite,  sore throat  Respiratory: cough, shortness of breath.   Cardiovascular: chest pain, dizziness, palpitations, orthopnea, swelling of feet, syncope  Gastrointestinal: nausea, vomiting, abdominal pain, diarrhea, black stool,  blood in stool, change in bowel habits  Genitourinary: hematuria, dysuria, urgency, frequency  Integument/Breast: rash,  pruritis  Hematologic/Lymphatic: easy bruising, lymphadenopathy  Musculoskeletal: arthralgias , myalgias, back pain, neck pain, knee pain  Neurological: confusion, seizures, tremors, slurred speech  Behavioral/Psych:  depression, anxiety, auditory or visual hallucinations     OBJECTIVE:     Physical Exam:  Body mass index is 24 kg/m².    Constitutional: Appears well-developed and well-nourished.   Head: Normocephalic and atraumatic.   Neck: Normal range of motion. Neck supple. left IJ trialysis  Cardiovascular: Normal heart rate.  Regular heart rhythm.  Pulmonary/Chest: Effort normal.   Abdominal: No distension.  No tenderness  Musculoskeletal: Normal range of motion. No edema.   Neurological: Alert and oriented to person, place, and time.   Skin: Skin is warm and dry.   Psychiatric: Normal mood and affect. Behavior is normal.                  Vital Signs  Temp: 98 °F (36.7 °C) (11/20/22 1558)  Pulse: 66 (11/20/22 1558)  Resp: 18 (11/20/22 1558)  BP: (Abnormal) 128/58 (11/20/22 1558)  SpO2: 99 % (11/20/22 1802)     24 Hour VS Range    Temp:  [97.7 °F (36.5 °C)-98 °F (36.7 °C)]   Pulse:  [59-68]   Resp:  [18-20]   BP: (111-131)/(53-60)   SpO2:  [96 %-99 %]     Intake/Output Summary (Last 24 hours) at 11/20/2022  1919  Last data filed at 11/20/2022 0808  Gross per 24 hour   Intake 240 ml   Output 500 ml   Net -260 ml         I/O This Shift:  No intake/output data recorded.    Wt Readings from Last 3 Encounters:   11/14/22 57.6 kg (127 lb)   10/14/22 68 kg (150 lb)   10/04/22 68 kg (149 lb 14.6 oz)       I have personally reviewed the vitals and recorded Intake/Output     Laboratory/Diagnostic Data:    CBC/Anemia Labs: Coags:    Recent Labs   Lab 11/18/22  1608 11/19/22  0610 11/20/22  0942   WBC 10.98 9.69 9.27   HGB 8.7* 7.8* 9.2*   HCT 27.1* 24.1* 29.5*    290 258   MCV 91 90 93   RDW 15.2* 15.1* 15.1*    No results for input(s): PT, INR, APTT in the last 168 hours.     Chemistries: ABG:   Recent Labs   Lab 11/18/22  0309 11/18/22  1608 11/19/22  0610 11/20/22  0443 11/20/22  1657      < > 136 135* 137   K 5.0   < > 4.6 5.1 5.0      < > 102 102 100   CO2 21*   < > 20* 19* 20*   BUN 96*   < > 102* 113* 112*   CREATININE 8.2*   < > 9.3* 9.8* 10.2*   CALCIUM 7.9*   < > 7.8* 7.9* 7.5*   MG 2.0  --  2.0 2.1  --    PHOS 6.6*   < > 6.8* 7.5* 7.7*    < > = values in this interval not displayed.    No results for input(s): PH, PCO2, PO2, HCO3, POCSATURATED, BE in the last 168 hours.     POCT Glucose: HbA1c:    Recent Labs   Lab 11/19/22  1124 11/19/22  1624 11/20/22  0004 11/20/22  0618 11/20/22  1254 11/20/22  1743   POCTGLUCOSE 137* 152* 137* 106 128* 155*    Hemoglobin A1C   Date Value Ref Range Status   08/02/2022 5.5 4.0 - 5.6 % Final     Comment:     ADA Screening Guidelines:  5.7-6.4%  Consistent with prediabetes  >or=6.5%  Consistent with diabetes    High levels of fetal hemoglobin interfere with the HbA1C  assay. Heterozygous hemoglobin variants (HbS, HgC, etc)do  not significantly interfere with this assay.   However, presence of multiple variants may affect accuracy.     11/22/2021 5.4 4.0 - 5.6 % Final     Comment:     ADA Screening Guidelines:  5.7-6.4%  Consistent with prediabetes  >or=6.5%   Consistent with diabetes    High levels of fetal hemoglobin interfere with the HbA1C  assay. Heterozygous hemoglobin variants (HbS, HgC, etc)do  not significantly interfere with this assay.   However, presence of multiple variants may affect accuracy.     01/08/2021 5.3 4.0 - 5.6 % Final     Comment:     ADA Screening Guidelines:  5.7-6.4%  Consistent with prediabetes  >or=6.5%  Consistent with diabetes  High levels of fetal hemoglobin interfere with the HbA1C  assay. Heterozygous hemoglobin variants (HbS, HgC, etc)do  not significantly interfere with this assay.   However, presence of multiple variants may affect accuracy.          Cardiac Enzymes: Ejection Fractions:    No results for input(s): CPK, CPKMB, MB, TROPONINI in the last 72 hours. EF   Date Value Ref Range Status   10/17/2022 65 % Final          No results for input(s): COLORU, APPEARANCEUA, PHUR, SPECGRAV, PROTEINUA, GLUCUA, KETONESU, BILIRUBINUA, OCCULTUA, NITRITE, UROBILINOGEN, LEUKOCYTESUR, RBCUA, WBCUA, BACTERIA, SQUAMEPITHEL, HYALINECASTS in the last 48 hours.    Invalid input(s): WRIGHTSUR    Procalcitonin (ng/mL)   Date Value   10/14/2022 0.12   09/24/2022 0.06     Lactate (Lactic Acid) (mmol/L)   Date Value   10/23/2022 0.9   10/17/2022 0.9   09/24/2022 0.7     BNP (pg/mL)   Date Value   10/23/2022 140 (H)   10/20/2022 335 (H)   10/17/2022 188 (H)   10/14/2022 281 (H)   09/24/2022 109 (H)     CRP (mg/L)   Date Value   04/06/2022 2.8     Sed Rate (mm/Hr)   Date Value   04/06/2022 54 (H)     D-Dimer (mg/L FEU)   Date Value   10/14/2022 1.96 (H)     Ferritin (ng/mL)   Date Value   10/30/2022 374 (H)     No results found for: LDH  Troponin I (ng/mL)   Date Value   10/23/2022 0.008   10/17/2022 0.052 (H)   10/17/2022 0.061 (H)   10/14/2022 <0.006   09/24/2022 0.006   04/18/2022 <0.006   08/13/2019 <0.006   04/28/2019 0.019     CPK (U/L)   Date Value   11/06/2022 92   04/18/2022 362 (H)   01/08/2021 317 (H)   04/28/2019 486 (H)   04/28/2019 484 (H)    04/27/2019 274 (H)   12/08/2016 216 (H)   07/30/2016 354 (H)     No results found for this or any previous visit.  SARS-CoV2 (COVID-19) Qualitative PCR (no units)   Date Value   10/24/2022 Not Detected   02/14/2022 Not Detected   09/25/2020 Not Detected   05/21/2020 Not Detected     SARS-CoV-2 RNA, Amplification, Qual (no units)   Date Value   10/17/2022 Negative     POC Rapid COVID (no units)   Date Value   09/24/2022 Negative       Microbiology labs for the last week  Microbiology Results (last 7 days)       ** No results found for the last 168 hours. **            Reviewed and noted in plan where applicable- Please see chart for full lab data.    Lines/Drains:  Trialysis (Dialysis) Catheter 10/25/22 2329 left internal jugular (Active)   Line Necessity Review CRRT/HD 11/13/22 2100   Verification by X-ray Yes 11/13/22 2100   Site Assessment No drainage;No swelling;No redness;No warmth 11/17/22 0945   Line Securement Device Secured with sutures 11/17/22 0945   Dressing Type Biopatch in place;Transparent (Tegaderm) 11/17/22 0945   Dressing Status Clean;Dry;Intact 11/17/22 0945   Dressing Intervention Integrity maintained 11/17/22 0945   Date on Dressing 11/09/22 11/15/22 1042   Dressing Due to be Changed 11/16/22 11/15/22 1042   Venous Patency/Care flushed w/o difficulty 11/15/22 1042   Arterial Patency/Care flushed w/o difficulty 11/15/22 1042   Distal Patency/Care flushed w/o difficulty 11/15/22 1042   Flows Good 11/11/22 1320   Waveform Not being transduced 11/10/22 1901   Number of days: 22            Midline Catheter Insertion/Assessment  - Single Lumen 10/18/22 1831 Left brachial vein 18g x 10cm (Active)   Site Assessment Clean;Dry;Intact 11/17/22 0945   IV Device Securement catheter securement device 11/17/22 0945   Line Status Saline locked 11/17/22 0945   Dressing Type Biopatch in place;Transparent (Tegaderm) 11/17/22 0945   Dressing Status Clean;Dry;Intact 11/17/22 0945   Dressing Intervention Integrity  maintained 11/17/22 0945   Dressing Change Due 11/22/22 11/15/22 2000   Site Change Due 11/15/22 11/15/22 2000   Reason Not Rotated Poor venous access 11/15/22 2000   Number of days: 29       Imaging  ECG Results              EKG 12-lead (Final result)  Result time 10/17/22 14:26:15      Final result by Interface, Lab In [facility] (10/17/22 14:26:15)               Narrative:    Test Reason : R06.02,    Vent. Rate : 093 BPM     Atrial Rate : 093 BPM     P-R Int : 126 ms          QRS Dur : 070 ms      QT Int : 356 ms       P-R-T Axes : 048 052 030 degrees     QTc Int : 442 ms    Normal sinus rhythm  Normal ECG  When compared with ECG of 14-OCT-2022 14:26,  Limb lead reversal has been corrected  Confirmed by Jc Barbosa MD ([id number]) on 10/17/2022 2:26:04 PM    Referred By: [email or web address]   SELF           Confirmed By:Jc Barbosa MD                                  Results for orders placed during the hospital encounter of 10/17/22    Echo    Interpretation Summary  · The left ventricle is normal in size with normal systolic function. The estimated ejection fraction is 65%.  · Normal right ventricular size with normal right ventricular systolic function.  · Grade I left ventricular diastolic dysfunction.  · Mild tricuspid regurgitation.  · There is pulmonary hypertension. The estimated PA systolic pressure is 56 mmHg.  · Normal to low central venous pressure (3 mmHg).      X-Ray Abdomen AP 1 View  Narrative: EXAMINATION:  XR ABDOMEN AP 1 VIEW    CLINICAL HISTORY:  NGT placement; Dysphagia, unspecified    TECHNIQUE:  AP View(s) of the abdomen was performed.    COMPARISON:  No 11/20/2022 ne    FINDINGS:  Feeding tube in the stomach.  No significant bowel dilatation.  Impression: See above    Electronically signed by: Ej Strickland MD  Date:    11/14/2022  Time:    11:02      Labs, Imaging, EKG and Diagnostic results from 11/20/2022 were reviewed.    Medications:  Medication list was reviewed and changes noted under  Assessment/Plan.  Current Facility-Administered Medications on File Prior to Encounter   Medication Dose Route Frequency Provider Last Rate Last Admin    celecoxib capsule 400 mg  400 mg Oral Once Dieudonne Marshall MD        fentaNYL 50 mcg/mL injection  mcg   mcg Intravenous PRN Dieudonne Marshall MD   100 mcg at 12/21/21 0919    LIDOcaine (PF) 10 mg/ml (1%) injection 10 mg  1 mL Intradermal Once PRN Dieudonne Marshall MD        LIDOcaine (PF) 10 mg/ml (1%) injection 10 mg  1 mL Intradermal Once Dieudonne Marshall MD        midazolam (VERSED) 1 mg/mL injection 0.5-4 mg  0.5-4 mg Intravenous PRN Dieudonne Marshall MD   2 mg at 12/21/21 0919    ropivacaine 0.2% Nimbus PainPRO Pump infusion 500 ML   Perineural Continuous Dieudonne Marshall MD   New Bag at 12/21/21 1153     Current Outpatient Medications on File Prior to Encounter   Medication Sig Dispense Refill    ACETAMINOPHEN (TYLENOL ARTHRITIS PAIN ORAL) Take 1 tablet by mouth once daily.       albuterol (VENTOLIN HFA) 90 mcg/actuation inhaler Inhale 2 puffs into the lungs every 6 (six) hours as needed for Shortness of Breath. Rescue 18 g 0    amLODIPine (NORVASC) 10 MG tablet Take 1 tablet (10 mg total) by mouth once daily. 90 tablet 3    aspirin 325 MG tablet Take 1 tablet at lunch daily starting after surgery for 6 weeks to prevent DVT. 42 tablet 0    diclofenac sodium (VOLTAREN) 1 % Gel Apply 2 g topically 4 (four) times daily. for 10 days 400 g 0    epinastine 0.05 % ophthalmic solution Place 1 drop into both eyes once daily.       EUTHYROX 25 mcg tablet Take 1 tablet by mouth once daily 90 tablet 0    fish,bora,flax oils-om3,6,9no1 (OMEGA 3-6-9) 1,200 mg Cap Take 1 each by mouth once daily. 30 capsule 3    fluticasone propionate (FLONASE) 50 mcg/actuation nasal spray 1 spray (50 mcg total) by Each Nostril route 2 (two) times daily. 16 g 0    FLUZONE HIGHDOSE QUAD 20-21  mcg/0.7 mL Syrg ADM 0.7ML IM UTD      hydrALAZINE  (APRESOLINE) 100 MG tablet TAKE 1 TABLET BY MOUTH THREE TIMES DAILY 90 tablet 0    HYDROcodone-acetaminophen (NORCO) 5-325 mg per tablet Take 1 tablet by mouth every 6 (six) hours as needed.      ibuprofen (ADVIL,MOTRIN) 800 MG tablet Take 800 mg by mouth every 8 (eight) hours as needed.      levocetirizine (XYZAL) 5 MG tablet Take 1 tablet (5 mg total) by mouth every evening. For sinus 30 tablet 0    lisinopriL (PRINIVIL,ZESTRIL) 40 MG tablet Take 1 tablet by mouth once daily 90 tablet 0    meloxicam (MOBIC) 15 MG tablet Take 1 tablet (15 mg total) by mouth daily as needed (arthritis). 30 tablet 2    methocarbamoL (ROBAXIN) 500 MG Tab Take 1 tablet (500 mg total) by mouth 3 (three) times daily as needed (muscle spasms). 40 tablet 0    metoprolol succinate (TOPROL-XL) 50 MG 24 hr tablet Take 1 tablet by mouth once daily 90 tablet 0    mupirocin (BACTROBAN) 2 % ointment Apply topically 2 (two) times daily.      tiZANidine (ZANAFLEX) 4 MG tablet Take 1 tablet by mouth twice daily as needed 20 tablet 0     Scheduled Medications:  sodium chloride 0.9%, , Intravenous, Once  aluminum-magnesium hydroxide-simethicone, 30 mL, Oral, Once   And  LIDOcaine HCl 2%, 15 mL, Oral, Once  amLODIPine, 10 mg, Per NG tube, Daily  atovaquone, 1,500 mg, Per NG tube, Daily  carvediloL, 25 mg, Per NG tube, BID  hydrALAZINE, 100 mg, Per NG tube, Q8H  levothyroxine, 25 mcg, Per NG tube, Before breakfast  lisinopriL, 40 mg, Per NG tube, Daily  nystatin, 500,000 Units, Oral, QID  pantoprazole, 40 mg, Oral, BID AC  predniSONE, 20 mg, Oral, Daily   Followed by  [START ON 12/4/2022] predniSONE, 12.5 mg, Oral, Daily   Followed by  [START ON 12/18/2022] predniSONE, 10 mg, Oral, Daily   Followed by  [START ON 1/1/2023] predniSONE, 5 mg, Oral, Daily  QUEtiapine, 25 mg, Oral, QHS  sodium bicarbonate, 650 mg, Oral, BID  sodium chloride 0.9% flush bag IVPB, , Intravenous, 1 time in Clinic/HOD  sodium zirconium cyclosilicate, 5 g, Oral, Daily    PRN:  sodium chloride, sodium chloride, acetaminophen, albuterol-ipratropium, aluminum-magnesium hydroxide-simethicone, bisacodyL, cloNIDine, dextrose 10%, dextrose 10%, glucagon (human recombinant), glucose, glucose, heparin (porcine), heparin, porcine (PF), heparin, porcine (PF), insulin aspart U-100, melatonin, methocarbamoL, naloxone, ondansetron, prochlorperazine, simethicone, sodium chloride 0.9%, sodium chloride 0.9%, sodium chloride 0.9%, sodium chloride 0.9%, sodium chloride 0.9%  Infusions:   Estimated Creatinine Clearance: 3.9 mL/min (A) (based on SCr of 10.2 mg/dL (H)).    Assessment/Plan:      * Microscopic polyangiitis  As of 10/26/22 strongly positive MPO Ab (in contrast to borderline positive PR3), consistent with clinical picture of microscopic polyangiitis with pulmonary, and renal involvement after presenting with acute hypoxemic respiratory failure, hemoptysis, and acute renal failure.  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   3)  CHANGES SUGGESTIVE OF FOCAL ACUTE PYELONEPHRITIS.   4)  MILD-TO-MODERATE ARTERIONEPHROSCLEROSIS   - Rheumatology consulted, appreciate evaluation and recommendations     - MITUL + 1:2560 homogenous, +dsDNA, normal complements     - PR3 1.2 (slight +),  (+)     - cANCA + 1:80, pANCA neg     - GBMAb neg, trace cryos  - Transfusion Medicine consulted for apheresis  - Nephrology consulted, see separate documentation for WILFREDO      Plan  - Steroids:    - s/p IV Solumedrol 1000 mg x3 doses. Now following PEXIVAS steroid taper. Currently on IV Solumedrol  20 mg daily.   - plan for 20mg IV daily on 11/6 for 14 days (last dose of 20mg equivalent is 11/20/2022).   - apheresis: underwent PLEX 10/26, 10/27, 10/30, 11/1, 11/2, 11/3  - rituximab every 7 days for 4 doses: Dose #1: 10/27, #2 11/3, #3 11/10; Next Rituximab 375 mg/m^2 due Nov 17th   - cyclophosphamide every 14 days for 2 doses: 10/27, 11/10  - Opportunistic Infection ppx: atovaquone 1500mg PO daily  - GI bleed prophylaxis: pantoprazole 40mg daily   11/17 Rheumatology following for steroid dosing and chemotherapeutic agents. on IV steroids due to p.o. intake issues, however can transition to p.o. when swallowing issues reliably resolved - on steroid taper - solumedrol .  rituximab dose due November 17 11/18  switched to prednisone taper by nephrology          Gastric reflux  PPI BID  Elevated HOB; feeds changed to bolus and tolerating well  Symptoms initially improved however reoccurrence on 11/13 without significant dietary changes; GI planning for EGD 11/14  TF further adjusted for smaller, more frequent bolus   s/p EGD 11/14; Normal Study      High risk medications (not anticoagulants) long-term use  as above      Anuria    11/17 Nephrology following for HD and  monitoring for renal recovery with some  improvement in UOP in last 2 days. will need PermCath and HD chair set up if no renal  recovery.     11/19  mL /24h.      Gastrointestinal hemorrhage with melena  Starting having hemoptysis and melena with associated acute blood loss anemia earlier in hospital stay. Patient with known internal hemorrhoids, mild sigmoid diverticulosis, history of gastritis and + H pylori (2012 EGD).   - GI consulted 10/19, unable to perform EGD due to instability and respiratory failure at the time    Plan  - patient unable to take PO PPI due to NGT removal on 11/5, transition to IV PPI 40mg pantoprazole daily, return to per NG tube once replaced  - s/p EGD 11/14; Normal Study  - PPI transitioned to BID as concern for  GERD  - Monitor CBC closely for signs of recurrent bleed  11/17 Hb trended dopwn to 6.6. Transfusion with 1 unit of PRBC .  111/8  continue protonix oral     Dysphagia  SLP following  MBSS showing global weakness in swallowing as well as aspiration with thin liquids.   ENT consulted but patient did not tolerate laryngoscopy     Plan   - Discussed case with Rheumatology team, they are concerned that this dysphagia may have been from prior stroke, requesting imaging for evaluation-  CT demonstrated no acute findings or causes for dysphagia NOR did MRI   - removed NGT and place dobhoff which is more comfortable and makes swallowing easier compared to NGT.    - continue working with speech therapy   -exam concerning for thrush; nystatin swish and swallow initiated; GI consulted as concern that oropharyngeal candidiasis may be contributing to dysphagia  -Per GI, Lower suspicion for esophageal candidiasis without odynophagia however can If white plaques have been seen on oropharynx, ok to start fluconazole empirically for possible esophageal candidiasis  -EGD on 11/14 without abnormality; per GI, Suspect some aspect of esophageal dysmotility in setting of critical illness. No further testing at this time.  -diet initiated per SLP on 11/16; NG tube removed  -continue to monitor p.o. intake closely  11/17 SLP recs Mechanical soft, Liquid Diet Level: Thin     Moderate malnutrition  Nutrition consulted. Most recent weight and BMI monitored-          Malnutrition (Moderate to Severe)  Weight Loss (Malnutrition): 7.5% in 3 months              Measurements:  Wt Readings from Last 1 Encounters:   11/14/22 57.6 kg (127 lb)   Body mass index is 24 kg/m².    Recommendations: Recommendation/Intervention: 1. As tolerated, increase TF rate (of Novasource) to 35 mL/hr  - 2. RD to monitor & follow-up.  Goals: Meet % EEN, EPN by RD f/u date    Patient has been screened and assessed by RD. RD will follow patient.      Encounter for  continuous renal replacement therapy (CRRT) for acute renal failure  Due to microscopic polyangiitis. Remains on hemodialysis intermittently.   - Baseline creatinine 1.0-1.2.   - New onset proteinuria/hematuria.   - Renal biopsy completed and   - Trialysis in place for intermittent Hemodialysis    Plan  - Nephrology consulted, appreciate management  - management of MPA as noted separately  - Renal function panel daily  - renally dose all medications  - intermittent Hemodialysis as indicated, per Nephrology   11/17     Recent Labs   Lab 11/18/22  1608 11/19/22  0610 11/20/22  0443   BUN 98* 102* 113*   CREATININE 8.7* 9.3* 9.8*     . Nephrology following for HD and  monitoring for renal recovery with some  improvement in UOP in last 2 days. will need PermCath and HD chair set up if no renal  recovery.  likely SNF pending nephro decision regarding HD   11/20  mL /24h. BUN/CR trending up to 113/9.8 , started on sodium bicarb for bicarb 19. continue lokelma 5g daily for 5.1     Acute hypoxemic respiratory failure  Multifocal pneumonia  Hemoptysis  Dyspnea  Diffuse Alveolar Hemorrahge  In the setting of a new diagnosis of microscopic polyangiitis, presented with fevers, dyspnea,.  - Chest imaging was consistent with diffuse alveolar hemorrhage.  CT chest wc 10/17 with bl perihilar dense consolidations w/ peripheral patcy areas of GGO, BL PNA, less c/f cardiogenic pulmonary edema.  - Patient upgraded to Medical ICU 10/23/22 with abrupt respiratory decline requiring NIPPV, improved with high dose IV steroids, plasmapheresis, emergent hemodialysis.   - Unable to perform bronchoscopy in the Medical ICU due to tenuous respiratory status  - As of 11/6, hypoxemia has significantly improved    Plan:  - weaned to room air  - continue management of underlying triggers with steroid and immunosuppressants  - incentive spirometry and respiratory hygiene  11/17 sats 92% on RA    Lymphadenopathy of head and neck  - Has bilateral  neck gland swelling with tenderness,consulted ENT  - No surgical intervention indicated   - Adenopathy likely reactive in nature   - Recommend further workup if it does not resolve within next 2 weeks     Hyponatremia  Has hyponatremia,started on NS. was given 2 grams of sodium chloride tablets TID for likely SIADH  11/18  sodium normalized at 137. salt tablets discontinued.      Acute blood loss anemia  In the setting of GI bleed with melena  - Evaluated by GI, see GI bleed documentation  - Last transfusion 10/28, Hgb slowly trending down since then  - Hgb 6.9 on 11/5      Plan  - Monitor CBC Daily   - Treat with pRBC transfusion PRN Hgb <7  - qualifies for transfusion on 11/5 with Hgb 6.9, 1u pRBC ordered  -stable   11/17 Hb trended dopwn to 6.6. Transfusion with 1 unit of PRBC .consider GI eval    Current CBC reviewed-    Recent Labs   Lab 11/18/22  0944 11/18/22  1608 11/19/22  0610   HGB 9.5* 8.7* 7.8*       Chronic diastolic heart failure  Pulmonary Hypertension due to left heart disease  TTE (10/2022) EF 65%, G1DD  Home meds: Lisinopril, Toprol     No signs or symptoms to suggest acute exacerbation    Plan  - Active volume control with intermittent Hemodialysis per Nephrology   - Daily weights (standing if tolerated)  - Strict I/Os  - Fluid restriction 1.5 day  - Cardiac diet w/ 2 g Na restriction      Hyperkalemia    resolved    Primary hypertension  BP Readings from Last 1 Encounters:   11/18/22 (Abnormal) 161/75     - Patient is unable to tolerate PO mediations, all meds to be administered via NG tube  -Will continue to monitor blood pressure trend closely and adjust antihypertensive regimen as clinically indicated and tolerated.    amLODIPine tablet 10 mg, 10 mg, Per NG tube, Daily    carvediloL tablet 12.5 mg, 12.5 mg, Per NG tube, BID    hydrALAZINE tablet 25 mg, 25 mg, Per NG tube, Q8H    lisinopriL tablet 40 mg, 40 mg, Per NG tube, Daily      Hypothyroid  - Continue synthroid 25 mcg  - TSH 0.585  10/27/22    VTE Risk Mitigation (From admission, onward)           Ordered     heparin, porcine (PF) 100 unit/mL injection flush 500 Units  As needed (PRN)         11/17/22 1343     heparin, porcine (PF) 100 unit/mL injection flush 500 Units  As needed (PRN)         11/10/22 1430     heparin (porcine) injection 1,000 Units  As needed (PRN)         11/09/22 1601     heparin (porcine) injection 1,000 Units  As needed (PRN)         11/08/22 0854     Place sequential compression device  Until discontinued         10/25/22 1731     IP VTE HIGH RISK PATIENT  Once         10/17/22 1354     Reason for No Pharmacological VTE Prophylaxis  Once        Question:  Reasons:  Answer:  Risk of Bleeding    10/17/22 1354                    Discharge Planning   ANTHONY: 11/23/2022     Code Status: Full Code   Is the patient medically ready for discharge?: No    Reason for patient still in hospital (select all that apply): Treatment  Discharge Plan A: Skilled Nursing Facility   Discharge Delays: None known at this time              West Quinn MD  Department of Hospital Medicine   [facility] - Intensive Care ([facility]

## 2022-11-20 NOTE — ASSESSMENT & PLAN NOTE
"Patient with baseline creatinine of 1 as recent as August 2022 with progressive worsening beginning in early October 1.3 (10/13)->1.6 (10/17)->3.0 (10/23) despite IV fluids.  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  Patient noted to have new onset proteinuria and hematuria over the last 1 month.  Additionally, would consider ATN in the setting of suspected sepsis from multifocal pneumonia.      Concerns for glomerulonephritis given patient's current clinical picture. Initial urine microscopy demonstrating muddy brown casts with likely acanthocytes noted as well. MITUL positive, proteinase 3 ab weakly positive, MPO strongly positive. Patient s/p high-dose IV solumedrol x3 doses, now on Solumedrol 50mg qd. Underwent kidney bx 10/27. Rheumatology consulted, and patient started on Plex, Ritux/cytoxan. Given continually worsening renal function and uremic encephalopathy, patient underwent SLED without complication on 10/27. Transferred to floor on 10/29. Significantly improved mentation on 10/31. Underwent HD 11/1. Patient also with increasing hypernatremia so added FWF to tube feeds.      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 Second dose Ritux on 11/3. Next dose of cytoxan on 11/10. Will discuss with Rheum regarding maintenance dosing.      Recommendations:  - No urgent indication for HD today. No signs of uremic encephalopathy or increasing O2 requirements  - No need for HD today, will continue to monitor  - Agree with lokelma 5 mg TID today. Can schedule daily 5mg if K continues to rise  - continue solumedrol 20 mg qd until 11/20, then 15 for 14 days, then 12.5 for 14 days, then 10 for 14 days, then 7.5 for 14 days then possibly life long 5 mg. (per PEXIVAS trial)  - Start NaHCO3 " 650 BID  - strict intake and output  - renally dose medications and avoid nephrotoxins when possible  - replete electrolytes as appropriate

## 2022-11-20 NOTE — PLAN OF CARE
Problem: Occupational Therapy  Goal: Occupational Therapy Goal  Description: Goals to be met by: 11/15     Patient will increase functional independence with ADLs by performing:    UE Dressing with Modified [place].  LE Dressing with Modified [place].  Grooming while standing with Modified [place].  Toileting from toilet with Modified [place] for hygiene and clothing management.   Supine to sit with Modified [place].  Step transfer with Modified [place]  Toilet transfer to toilet with Modified [place].    Outcome: Ongoing, Progressing

## 2022-11-20 NOTE — PLAN OF CARE
Patient alert and oriented x 4. Up to chair for breakfast and lunch. Eating food daughter brought, doesn't like the hospital food. States feeling better today. Safety measures in place.

## 2022-11-20 NOTE — PROGRESS NOTES
[facility] - [facility] ([place] [address])  Nephrology  Progress Note    Patient Name: Kristin Goodman  MRN: [id number]  Admission Date: 10/17/2022  Hospital Length of Stay: 34 days  Attending Provider: West Quinn MD   Primary Care Physician: Lori Hernandez MD  Principal Problem:Microscopic polyangiitis    Subjective:     HPI: Kristin Goodman is a 77 year old female with hypertension, hypothyroidism, HFpEF who presents for cough, shortness of breath, and weakness.  Patient initially presented to ER on 09/24 with hemoptysis and imaging concerning for multilobar pneumonia at which time she was discharged on a 10 day course of levofloxacin.  Patient initially had some improvement but began having worsening symptoms on 10/14.  Patient describes multiple fevers and progressive shortness of breath.  She continues to have intermittent hemoptysis.  Patient with worsening leukocytosis and limited clinical improvement despite broad-spectrum antibiotics.  She remains on cefepime.  Patient is a noted to have baseline creatinine of 1 as recent as 8/2022 but progressive worsening of creatinine in early October.  On admission patient with creatinine of 1.6 (10/17) with subsequent progression and creatinine of 3.0 at time of consultation (10/23).  Nephrology consulted for acute kidney injury.      Interval History: K increased to 5.1. Patient is AAOx4 this AM and on room air. No urgent indication for dialysis     Review of patient's allergies indicates:   Allergen Reactions    Ampicillin     Peaches [peach (prunus persica)] Other (See Comments)     Pt unable to state type of reaction. Information obtained from daughter who states she was informed of allergy from patient.    Penicillins      Other reaction(s): Hives    Sulfa (sulfonamide antibiotics) Rash and Hives     Current Facility-Administered Medications   Medication Frequency    0.9%  NaCl infusion (for blood administration) Q24H PRN    0.9%  NaCl infusion (for  blood administration) Q24H PRN    0.9%  NaCl infusion Once    acetaminophen tablet 650 mg Q4H PRN    albuterol-ipratropium 2.5 mg-0.5 mg/3 mL nebulizer solution 3 mL Q4H PRN    aluminum-magnesium hydroxide-simethicone 200-200-20 mg/5 mL suspension 30 mL QID PRN    aluminum-magnesium hydroxide-simethicone 200-200-20 mg/5 mL suspension 30 mL Once    And    LIDOcaine HCl 2% oral solution 15 mL Once    amLODIPine tablet 10 mg Daily    atovaquone 750 mg/5 mL oral liquid 1,500 mg Daily    bisacodyL suppository 10 mg Daily PRN    carvediloL tablet 25 mg BID    cloNIDine tablet 0.1 mg Q6H PRN    dextrose 10% bolus 125 mL PRN    dextrose 10% bolus 250 mL PRN    glucagon (human recombinant) injection 1 mg PRN    glucose chewable tablet 16 g PRN    glucose chewable tablet 24 g PRN    heparin (porcine) injection 1,000 Units PRN    heparin, porcine (PF) 100 unit/mL injection flush 500 Units PRN    heparin, porcine (PF) 100 unit/mL injection flush 500 Units PRN    hydrALAZINE tablet 100 mg Q8H    insulin aspart U-100 pen 1-10 Units Q6H PRN    levothyroxine tablet 25 mcg Before breakfast    lisinopriL tablet 40 mg Daily    melatonin tablet 6 mg Nightly PRN    methocarbamoL tablet 500 mg TID PRN    naloxone 0.4 mg/mL injection 0.02 mg PRN    nystatin 100,000 unit/mL suspension 500,000 Units QID    ondansetron injection 4 mg Q8H PRN    pantoprazole EC tablet 40 mg BID AC    predniSONE tablet 20 mg Daily    Followed by    [START ON 12/4/2022] predniSONE tablet 12.5 mg Daily    Followed by    [START ON 12/18/2022] predniSONE tablet 10 mg Daily    Followed by    [START ON 1/1/2023] predniSONE tablet 5 mg Daily    prochlorperazine injection Soln 5 mg Q6H PRN    QUEtiapine tablet 25 mg QHS    simethicone chewable tablet 80 mg QID PRN    sodium bicarbonate tablet 650 mg BID    sodium chloride 0.9% 250 mL flush bag [facility]    sodium chloride 0.9% flush 10 mL PRN    sodium chloride 0.9%  flush 10 mL PRN    sodium chloride 0.9% flush 10 mL PRN    sodium chloride 0.9% flush 10 mL PRN    sodium chloride 0.9% flush 5 mL PRN    sodium zirconium cyclosilicate packet 5 g Daily     Facility-Administered Medications Ordered in Other Encounters   Medication Frequency    celecoxib capsule 400 mg Once    fentaNYL 50 mcg/mL injection  mcg PRN    LIDOcaine (PF) 10 mg/ml (1%) injection 10 mg Once PRN    LIDOcaine (PF) 10 mg/ml (1%) injection 10 mg Once    midazolam (VERSED) 1 mg/mL injection 0.5-4 mg PRN    ropivacaine 0.2% [facility] PainPRO Pump infusion 500 ML Continuous       Objective:     Vital Signs (Most Recent):  Temp: 97.9 °F (36.6 °C) (11/20/22 0819)  Pulse: 68 (11/20/22 0819)  Resp: 20 (11/20/22 0819)  BP: 129/60 (11/20/22 0819)  SpO2: 98 % (11/20/22 1048)  O2 Device (Oxygen Therapy): room air (11/20/22 0819) Vital Signs (24h Range):  Temp:  [97.8 °F (36.6 °C)-97.9 °F (36.6 °C)] 97.9 °F (36.6 °C)  Pulse:  [62-68] 68  Resp:  [16-20] 20  SpO2:  [93 %-98 %] 98 %  BP: (115-131)/(56-60) 129/60     Weight: 57.6 kg (127 lb) (11/14/22 0958)  Body mass index is 24 kg/m².  Body surface area is 1.57 meters squared.    I/O last 3 completed shifts:  In: 240 [P.O.:240]  Out: 500 [Urine:500]    Physical Exam  Vitals and nursing note reviewed.   Constitutional:       General: She is awake.      Appearance: She is well-developed. She is ill-appearing. She is not toxic-appearing or diaphoretic.   HENT:      Head: Normocephalic and atraumatic.      Right Ear: External ear normal.      Left Ear: External ear normal.      Nose:      Comments: + NGT     Mouth/Throat:      Mouth: Mucous membranes are dry.   Eyes:      General: Lids are normal. No scleral icterus.        Right eye: No discharge.         Left eye: No discharge.   Cardiovascular:      Rate and Rhythm: Normal rate and regular rhythm.   Pulmonary:      Effort: Pulmonary effort is normal.      Breath sounds: Normal breath sounds. No wheezing or  rhonchi.   Abdominal:      General: Bowel sounds are normal.      Palpations: Abdomen is soft.      Tenderness: There is no abdominal tenderness.   Musculoskeletal:         General: No deformity.      Cervical back: Normal range of motion and neck supple. No rigidity or tenderness.      Right lower leg: No edema.      Left lower leg: No edema.   Skin:     General: Skin is warm and dry.   Neurological:      General: No focal deficit present.      Mental Status: She is alert and oriented to person, place, and time. Mental status is at baseline.   Psychiatric:         Attention and Perception: Attention normal.         Behavior: Behavior normal.       Significant Labs:  All labs within the past 24 hours have been reviewed.     Significant Imaging:  Labs: Reviewed    Assessment/Plan:     Encounter for continuous renal replacement therapy (CRRT) for acute renal failure  Patient with baseline creatinine of 1 as recent as August 2022 with progressive worsening beginning in early October 1.3 (10/13)->1.6 (10/17)->3.0 (10/23) despite IV fluids.  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  Patient noted to have new onset proteinuria and hematuria over the last 1 month.  Additionally, would consider ATN in the setting of suspected sepsis from multifocal pneumonia.      Concerns for glomerulonephritis given patient's current clinical picture. Initial urine microscopy demonstrating muddy brown casts with likely acanthocytes noted as well. MITUL positive, proteinase 3 ab weakly positive, MPO strongly positive. Patient s/p high-dose IV solumedrol x3 doses, now on Solumedrol 50mg qd. Underwent kidney bx 10/27. Rheumatology consulted, and patient started on Plex, Ritux/cytoxan. Given continually worsening renal function and uremic encephalopathy, patient underwent SLED without complication on 10/27. Transferred to floor on 10/29. Significantly improved mentation on  "10/31. Underwent HD 11/1. Patient also with increasing hypernatremia so added FWF to tube feeds.      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 Second dose Ritux on 11/3. Next dose of cytoxan on 11/10. Will discuss with Rheum regarding maintenance dosing.      Recommendations:  - No urgent indication for HD today. No signs of uremic encephalopathy or increasing O2 requirements  - No need for HD today, will continue to monitor  - Agree with lokelma 5 mg TID today. Can schedule daily 5mg if K continues to rise  - continue solumedrol 20 mg qd until 11/20, then 15 for 14 days, then 12.5 for 14 days, then 10 for 14 days, then 7.5 for 14 days then possibly life long 5 mg. (per PEXIVAS trial)  - Start NaHCO3 650 BID  - strict intake and output  - renally dose medications and avoid nephrotoxins when possible  - replete electrolytes as appropriate    Hyponatremia  Resolved           Thank you for your consult. I will follow-up with patient. Please contact us if you have any additional questions.    Kylah Jj MD  Nephrology  [facility] ([facility]-)  "

## 2022-11-20 NOTE — PT/OT/SLP PROGRESS
"Physical Therapy Treatment    Patient Name:  Kristin Goodman   MRN:  [id number]    Recommendations:     Discharge Recommendations:  nursing facility, skilled   Discharge Equipment Recommendations: bedside commode   Barriers to discharge:  Pt requires increased assistance with mobility at this time.     Assessment:     Kristin Goodman is a 77 y.o. female admitted with a medical diagnosis of Microscopic polyangiitis.  She presents with the following impairments/functional limitations:  weakness, impaired endurance, impaired functional mobility, gait instability, impaired balance, decreased lower extremity function, pain. Pt participated, and tolerated treatment fairly well. Pt will continue to benefit from skilled PT services to improve all deficits  noted above. Resume PT POC as indicated.     Rehab Prognosis: Good; patient would benefit from acute skilled PT services to address these deficits and reach maximum level of function.    Recent Surgery: Procedure(s) (LRB):  EGD (ESOPHAGOGASTRODUODENOSCOPY) (N/A) 6 Days Post-Op    Plan:     During this hospitalization, patient to be seen 3 x/week to address the identified rehab impairments via therapeutic activities, therapeutic exercises, neuromuscular re-education and progress toward the following goals:    Plan of Care Expires:  11/30/22    Subjective     "I just finished walking not to long ago." Pt had finished working with OT prior to tx session.   Pain/Comfort:  Pain Rating 1: 0/10  Pain Rating Post-Intervention 1: 0/10      Objective:     Communicated with nursing prior to session.  Patient found up in chair with  (lines intact and daughter present) upon PT entry to room.     General Precautions: Standard, fall   Orthopedic Precautions:N/A   Braces: N/A       Functional Mobility:  Transfers:  Sit to Stand: to/from bedside chair moderate assistance with rolling walker. Cueing for proper hand placement.   Gait: Pt declined 2/2 fatigue due to ambulating with OT prior to PT " session.  Balance: Pt stood with RW and CGA for ~20 seconds. Activity ceased 2/2 pt with reports of dizziness. Nursing notified.       AM-PAC 6 CLICK MOBILITY  Turning over in bed (including adjusting bedclothes, sheets and blankets)?: 3  Sitting down on and standing up from a chair with arms (e.g., wheelchair, bedside commode, etc.): 2  Moving from lying on back to sitting on the side of the bed?: 3  Moving to and from a bed to a chair (including a wheelchair)?: 3  Need to walk in hospital room?: 3  Climbing 3-5 steps with a railing?: 2  Basic Mobility Total Score: 16       Treatment & Education:  -Pt educated on the importance/benefits of working with therapy.   -Answered all questions/concerns within PTA scope of practice.   -Pt performed seated BLE exercises x10 reps: AP,LAQ,HF,Hip abd/add    Patient left up in chair with all lines intact, call button in reach, nursing notified, and daughter present..    GOALS:   Multidisciplinary Problems       Physical Therapy Goals          Problem: Physical Therapy    Goal Priority Disciplines Outcome Goal Variances Interventions   Physical Therapy Goal     PT, PT/OT Ongoing, Progressing     Description: Goals to be met by: 2022     Patient will increase functional independence with mobility by performin. Supine to sit with contact guard assistance  2. Sit to supine with contact guard assistance  3. Sit to stand transfer with minimum assistance MET   3a. STS supvn LRAD  4. Gait  x 40 feet with minimum assistance using LRAD as needed  5. Lower extremity exercise program x10 reps per handout, with independence                        Time Tracking:     PT Received On: 22  PT Start Time: 1040     PT Stop Time: 1058  PT Total Time (min): 18 min     Billable Minutes: Therapeutic Activity 18    Treatment Type: Treatment  PT/PTA: PTA     PTA Visit Number: 2     2022

## 2022-11-21 PROBLEM — N05.7 PRIMARY PAUCI-IMMUNE NECROTIZING AND CRESCENTIC GLOMERULONEPHRITIS: Status: ACTIVE | Noted: 2022-11-10

## 2022-11-21 PROBLEM — N05.8 PRIMARY PAUCI-IMMUNE NECROTIZING AND CRESCENTIC GLOMERULONEPHRITIS: Status: ACTIVE | Noted: 2022-11-10

## 2022-11-21 LAB
ALBUMIN SERPL BCP-MCNC: 2.5 G/DL (ref 3.5–5.2)
ANION GAP SERPL CALC-SCNC: 16 MMOL/L (ref 8–16)
BASOPHILS # BLD AUTO: 0.01 K/UL (ref 0–0.2)
BASOPHILS NFR BLD: 0.1 % (ref 0–1.9)
BUN SERPL-MCNC: 113 MG/DL (ref 8–23)
CALCIUM SERPL-MCNC: 7.8 MG/DL (ref 8.7–10.5)
CHLORIDE SERPL-SCNC: 100 MMOL/L (ref 95–110)
CO2 SERPL-SCNC: 19 MMOL/L (ref 23–29)
CREAT SERPL-MCNC: 10.1 MG/DL (ref 0.5–1.4)
DIFFERENTIAL METHOD: ABNORMAL
EOSINOPHIL # BLD AUTO: 0 K/UL (ref 0–0.5)
EOSINOPHIL NFR BLD: 0.1 % (ref 0–8)
ERYTHROCYTE [DISTWIDTH] IN BLOOD BY AUTOMATED COUNT: 14.7 % (ref 11.5–14.5)
EST. GFR  (NO RACE VARIABLE): 3.7 ML/MIN/1.73 M^2
GLUCOSE SERPL-MCNC: 111 MG/DL (ref 70–110)
HCT VFR BLD AUTO: 25.9 % (ref 37–48.5)
HGB BLD-MCNC: 8.2 G/DL (ref 12–16)
IMM GRANULOCYTES # BLD AUTO: 0.12 K/UL (ref 0–0.04)
IMM GRANULOCYTES NFR BLD AUTO: 1.5 % (ref 0–0.5)
LYMPHOCYTES # BLD AUTO: 0.6 K/UL (ref 1–4.8)
LYMPHOCYTES NFR BLD: 7.7 % (ref 18–48)
MAGNESIUM SERPL-MCNC: 2 MG/DL (ref 1.6–2.6)
MCH RBC QN AUTO: 28.7 PG (ref 27–31)
MCHC RBC AUTO-ENTMCNC: 31.7 G/DL (ref 32–36)
MCV RBC AUTO: 91 FL (ref 82–98)
MONOCYTES # BLD AUTO: 0.7 K/UL (ref 0.3–1)
MONOCYTES NFR BLD: 7.9 % (ref 4–15)
NEUTROPHILS # BLD AUTO: 6.8 K/UL (ref 1.8–7.7)
NEUTROPHILS NFR BLD: 82.7 % (ref 38–73)
NRBC BLD-RTO: 0 /100 WBC
PHOSPHATE SERPL-MCNC: 7.8 MG/DL (ref 2.7–4.5)
PHOSPHATE SERPL-MCNC: 8.1 MG/DL (ref 2.7–4.5)
PLATELET # BLD AUTO: 210 K/UL (ref 150–450)
PMV BLD AUTO: 11.1 FL (ref 9.2–12.9)
POCT GLUCOSE: 126 MG/DL (ref 70–110)
POCT GLUCOSE: 193 MG/DL (ref 70–110)
POTASSIUM SERPL-SCNC: 4.4 MMOL/L (ref 3.5–5.1)
RBC # BLD AUTO: 2.86 M/UL (ref 4–5.4)
SODIUM SERPL-SCNC: 135 MMOL/L (ref 136–145)
WBC # BLD AUTO: 8.23 K/UL (ref 3.9–12.7)

## 2022-11-21 PROCEDURE — 25000003 PHARM REV CODE 250: Performed by: HOSPITALIST

## 2022-11-21 PROCEDURE — 25000003 PHARM REV CODE 250: Performed by: STUDENT IN AN ORGANIZED HEALTH CARE EDUCATION/TRAINING PROGRAM

## 2022-11-21 PROCEDURE — 80069 RENAL FUNCTION PANEL: CPT | Performed by: HOSPITALIST

## 2022-11-21 PROCEDURE — 99232 SBSQ HOSP IP/OBS MODERATE 35: CPT | Mod: ,,, | Performed by: HOSPITALIST

## 2022-11-21 PROCEDURE — 99232 PR SUBSEQUENT HOSPITAL CARE,LEVL II: ICD-10-PCS | Mod: ,,, | Performed by: HOSPITALIST

## 2022-11-21 PROCEDURE — 97530 THERAPEUTIC ACTIVITIES: CPT | Mod: CQ

## 2022-11-21 PROCEDURE — 36415 COLL VENOUS BLD VENIPUNCTURE: CPT | Performed by: HOSPITALIST

## 2022-11-21 PROCEDURE — 85025 COMPLETE CBC W/AUTO DIFF WBC: CPT | Performed by: HOSPITALIST

## 2022-11-21 PROCEDURE — 84100 ASSAY OF PHOSPHORUS: CPT

## 2022-11-21 PROCEDURE — 99233 PR SUBSEQUENT HOSPITAL CARE,LEVL III: ICD-10-PCS | Mod: GC,,, | Performed by: INTERNAL MEDICINE

## 2022-11-21 PROCEDURE — 36415 COLL VENOUS BLD VENIPUNCTURE: CPT

## 2022-11-21 PROCEDURE — 63600175 PHARM REV CODE 636 W HCPCS: Performed by: STUDENT IN AN ORGANIZED HEALTH CARE EDUCATION/TRAINING PROGRAM

## 2022-11-21 PROCEDURE — 25000003 PHARM REV CODE 250

## 2022-11-21 PROCEDURE — 99233 SBSQ HOSP IP/OBS HIGH 50: CPT | Mod: GC,,, | Performed by: INTERNAL MEDICINE

## 2022-11-21 PROCEDURE — 92526 ORAL FUNCTION THERAPY: CPT

## 2022-11-21 PROCEDURE — 83735 ASSAY OF MAGNESIUM: CPT

## 2022-11-21 PROCEDURE — 20600001 HC STEP DOWN PRIVATE ROOM

## 2022-11-21 RX ADMIN — PREDNISONE 20 MG: 20 TABLET ORAL at 08:11

## 2022-11-21 RX ADMIN — HYDRALAZINE HYDROCHLORIDE 100 MG: 50 TABLET ORAL at 07:11

## 2022-11-21 RX ADMIN — QUETIAPINE FUMARATE 25 MG: 25 TABLET ORAL at 09:11

## 2022-11-21 RX ADMIN — SIMETHICONE 80 MG: 80 TABLET, CHEWABLE ORAL at 01:11

## 2022-11-21 RX ADMIN — NYSTATIN 500000 UNITS: 500000 SUSPENSION ORAL at 08:11

## 2022-11-21 RX ADMIN — AMLODIPINE BESYLATE 10 MG: 10 TABLET ORAL at 08:11

## 2022-11-21 RX ADMIN — BISACODYL 10 MG: 10 SUPPOSITORY RECTAL at 12:11

## 2022-11-21 RX ADMIN — LEVOTHYROXINE SODIUM 25 MCG: 25 TABLET ORAL at 07:11

## 2022-11-21 RX ADMIN — PANTOPRAZOLE SODIUM 40 MG: 40 TABLET, DELAYED RELEASE ORAL at 04:11

## 2022-11-21 RX ADMIN — ATOVAQUONE 1500 MG: 750 SUSPENSION ORAL at 08:11

## 2022-11-21 RX ADMIN — Medication 6 MG: at 09:11

## 2022-11-21 RX ADMIN — HYDRALAZINE HYDROCHLORIDE 100 MG: 50 TABLET ORAL at 01:11

## 2022-11-21 RX ADMIN — CARVEDILOL 25 MG: 25 TABLET, FILM COATED ORAL at 08:11

## 2022-11-21 RX ADMIN — NYSTATIN 500000 UNITS: 500000 SUSPENSION ORAL at 01:11

## 2022-11-21 RX ADMIN — SODIUM ZIRCONIUM CYCLOSILICATE 5 G: 5 POWDER, FOR SUSPENSION ORAL at 08:11

## 2022-11-21 RX ADMIN — SODIUM BICARBONATE 650 MG TABLET 650 MG: at 09:11

## 2022-11-21 RX ADMIN — PANTOPRAZOLE SODIUM 40 MG: 40 TABLET, DELAYED RELEASE ORAL at 07:11

## 2022-11-21 RX ADMIN — NYSTATIN 500000 UNITS: 500000 SUSPENSION ORAL at 04:11

## 2022-11-21 RX ADMIN — LISINOPRIL 40 MG: 20 TABLET ORAL at 08:11

## 2022-11-21 RX ADMIN — HYDRALAZINE HYDROCHLORIDE 100 MG: 50 TABLET ORAL at 09:11

## 2022-11-21 RX ADMIN — CARVEDILOL 25 MG: 25 TABLET, FILM COATED ORAL at 09:11

## 2022-11-21 RX ADMIN — SODIUM BICARBONATE 650 MG TABLET 650 MG: at 08:11

## 2022-11-21 NOTE — PROGRESS NOTES
[facility] - [facility] ([address] Medicine  Progress Note    Patient Name: Kristin Goodman  MRN: [id number]  Patient Class: IP- Inpatient   Admission Date: 10/17/2022  Length of Stay: 35 days  Attending Physician: West Quinn MD  Primary Care Provider: Lori Hernandez MD        Subjective:     Principal Problem:Microscopic polyangiitis        HPI:  Kristin Goodman is a 77 y.o. female with a past medical history of HTN, hypothyroidism, HFpEF, and osteoarthritis of the arm who has presented to the ED for cough, SOB, and weakness. Daughter is present at the bedside. Patient presented to the ED on 9/24 with hemoptysis, CT and CXR showed high suspicion for multilobar pneumonia. Patient was discharged with a 10-day course of levofloxacin and an albuterol inhaler. Patient followed up with her PCP on 10/4 with slight improvement in symptoms and went back to work. Patient continued to have worsening symptoms and presented to the ED again on 10/14 for evaluation and no interventions were done. Patient endorses fevers over the last few days up to 102.4 F with progressive SOB. She endorses hemoptysis with moderate amount of blood, generalized weakness, productive cough, SOB, and loss of appetite. Denies chest pain, nausea, vomiting, abdominal pain, or urinary changes.    ED: hypertensive up to 219/93 and tachycardic up to 117. Oxygen saturation on 92% on RA, placed on 5L NC with sats >97%. CBC remarkable for leukocytosis of 16.03 and Hb 7.7. K 3.3. Cr 1.6, baseline ~1.0. . Troponin 0.061. COVID and flu negative. EKG NSR. CT chest with contrast pending at time of admission. Given home amlodipine and lisinopril. Given 500mL NS, IV azithromycin, cefepime and vanc.       Overview/Hospital Course:  Ms. Goodman was admitted to Hospital Medicine for management of multifocal pneumonia and acute hypoxemic respiratory failure after presenting to the ED with fevers, cough, hemoptysis, and dyspnea. She required  escalation to the Medical ICU due to abrupt decline in her respiratory status, associated with hemoptysis and requiring NIPPV. CT chest showed bilateral patchy ground glass opacities. Labs additionally were notable for acute renal failure on CKD3. Pulmonology was consulted while under the care of [facility] and felt this picture was consistent with diffuse alveolar hemorrhage.     Her workup revealed a strongly positive MPO Ab consistent with clinical picture of microscopic polyangiitis with pulmonary and renal involvement. She underwent Trialysis catheter placement 10/25/2022 in the Medical ICU. 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     Rheumatology was consulted, extensive workup revealed MITUL + 1:2560 homogenous, +dsDNA, normal complements, PR3 1.2 (slight +),  (+), cANCA + 1:80, pANCA neg, GBMAb neg, trace cryos. Due to concern for MPA, she was started on pulse dose IV methylprednisolone 1000mg for 3 days, and plasmapheresis under the guidance of Transfusion Medicine. She remains on a steroid taper and has completed PLEX. She additionally received rituximab and cyclophosphamide. OI prophylaxis was provided with atovaquone due to a sulfa allergy.     Nephrology was consulted for evaluation of acute renal failure in the setting of MPA. She did require SLED in the ICU for renal clearance and remains on intermittent hemodialysis. She is expected to continue HD once discharged.     Her acute hypoxemic respiratory failure improved and she was weaned off of supplemental oxygen. She stepped down to [facility] 10/28.     She underwent MBBS for evaluation of dysphagia, which showed global delayed initiation of swallow and aspiration with thin liquids. Due to  concern for possible prior stroke, head imaging was completed and negative for acute finding. She is NPO with NG tube. ENT was consulted for evaluation of dysphagia and cervical adenopathy.  Patient did not tolerate laryngoscopy; unable to visualize vocal cords but given her lack of hoarseness, they did not suspect vocal fold paresis/paralysis. MRI recommended by rheum to fully rule out any potential neurological cause of the patient's dysphagia; but demonstrated   remote left thalamic lacunar type infarct and punctate remote left cerebellar infarcts.  She is continuing to work with speech therapy.  EGD completed 11/14 without abnormalities.  Per GI, Suspect some aspect of esophageal dysmotility in setting of critical illness. No further testing at this time.  Per SLP, diet advanced on 11/15 and NG tube removed.    Her other chronic medical conditions including hypothyroidism, diastolic CHF, and hypertension were managed with her home medications, with dose adjustments as needed.     11/17 Hb trended dopwn to 6.6. Transfusion with 1 unit of PRBC .  Rheumatology following for steroid dosing and chemotherapeutic agents. on IV steroids due to p.o. intake issues, however can transition to p.o. when swallowing issues reliably resolved - on steroid taper - solumedrol   rituximab dose due November 17; has been . Nephrology following for HD and  monitoring for renal recovery with some  improvement in UOP in last 2 days. will need PermCath and HD chair set up if no renal  recovery.  likely SNF pending nephro decision regarding HD . No need for HD today. Continue 2 grams of sodium chloride tablets TID for likely SIADH. SLP recs Mechanical soft, Liquid Diet Level: Thin   11/18  sodium normalized at 137. salt tablets discontinued. Hb at 8.5 s/p 1 U PRBC. UOP 400mL /24h.  K at 5 . switched to prednisone taper by nephrology .started lokelma 5 mg x 3days  11/19  mL /24h. BUN/CR trending up to 102/9.3 ,no HD for now per  nephro  11/20  mL /24h. BUN/CR trending up to 113/9.8 , started on sodium bicarb for bicarb 19. continue lokelma 5g daily for 5.1 .   11/21 nephrology follow up  -No urgent indication for HD today as with no signs of uremic encephalopathy or  O2 requirements          Review of Systems:   Pain scale:   Constitutional:  fever,  chills, headache, vision loss, hearing loss, weight loss, Generalized weakness, falls, loss of smell, loss of taste, poor appetite,  sore throat  Respiratory: cough, shortness of breath.   Cardiovascular: chest pain, dizziness, palpitations, orthopnea, swelling of feet, syncope  Gastrointestinal: nausea, vomiting, abdominal pain, diarrhea, black stool,  blood in stool, change in bowel habits  Genitourinary: hematuria, dysuria, urgency, frequency  Integument/Breast: rash,  pruritis  Hematologic/Lymphatic: easy bruising, lymphadenopathy  Musculoskeletal: arthralgias , myalgias, back pain, neck pain, knee pain  Neurological: confusion, seizures, tremors, slurred speech  Behavioral/Psych:  depression, anxiety, auditory or visual hallucinations     OBJECTIVE:     Physical Exam:  Body mass index is 24 kg/m².    Constitutional: Appears well-developed and well-nourished.   Head: Normocephalic and atraumatic.   Neck: Normal range of motion. Neck supple. left IJ trialysis  Cardiovascular: Normal heart rate.  Regular heart rhythm.  Pulmonary/Chest: Effort normal.   Abdominal: No distension.  No tenderness  Musculoskeletal: Normal range of motion. No edema.   Neurological: Alert and oriented to person, place, and time.   Skin: Skin is warm and dry.   Psychiatric: Normal mood and affect. Behavior is normal.                  Vital Signs  Temp: 97.4 °F (36.3 °C) (11/21/22 1602)  Pulse: (Abnormal) 57 (11/21/22 1602)  Resp: 18 (11/21/22 1602)  BP: (Abnormal) 109/55 (11/21/22 1602)  SpO2: 98 % (11/21/22 1602)     24 Hour VS Range    Temp:  [97.2 °F (36.2 °C)-98.9 °F (37.2 °C)]   Pulse:  [57-69]   Resp:   [14-18]   BP: (109-134)/(55-63)   SpO2:  [93 %-98 %]     Intake/Output Summary (Last 24 hours) at 11/21/2022 1858  Last data filed at 11/21/2022 1434  Gross per 24 hour   Intake 120 ml   Output 400 ml   Net -280 ml         I/O This Shift:  I/O this shift:  In: 120 [P.O.:120]  Out: 400 [Urine:400]    Wt Readings from Last 3 Encounters:   11/14/22 57.6 kg (127 lb)   10/14/22 68 kg (150 lb)   10/04/22 68 kg (149 lb 14.6 oz)       I have personally reviewed the vitals and recorded Intake/Output     Laboratory/Diagnostic Data:    CBC/Anemia Labs: Coags:    Recent Labs   Lab 11/19/22  0610 11/20/22  0942 11/21/22  0550   WBC 9.69 9.27 8.23   HGB 7.8* 9.2* 8.2*   HCT 24.1* 29.5* 25.9*    258 210   MCV 90 93 91   RDW 15.1* 15.1* 14.7*    No results for input(s): PT, INR, APTT in the last 168 hours.     Chemistries: ABG:   Recent Labs   Lab 11/19/22  0610 11/20/22  0443 11/20/22  1657 11/21/22  0550 11/21/22  1059    135* 137  --  135*   K 4.6 5.1 5.0  --  4.4    102 100  --  100   CO2 20* 19* 20*  --  19*   * 113* 112*  --  113*   CREATININE 9.3* 9.8* 10.2*  --  10.1*   CALCIUM 7.8* 7.9* 7.5*  --  7.8*   MG 2.0 2.1  --  2.0  --    PHOS 6.8* 7.5* 7.7* 8.1* 7.8*    No results for input(s): PH, PCO2, PO2, HCO3, POCSATURATED, BE in the last 168 hours.     POCT Glucose: HbA1c:    Recent Labs   Lab 11/20/22  0004 11/20/22  0618 11/20/22  1254 11/20/22  1743 11/21/22  1130 11/21/22  1603   POCTGLUCOSE 137* 106 128* 155* 126* 193*    Hemoglobin A1C   Date Value Ref Range Status   08/02/2022 5.5 4.0 - 5.6 % Final     Comment:     ADA Screening Guidelines:  5.7-6.4%  Consistent with prediabetes  >or=6.5%  Consistent with diabetes    High levels of fetal hemoglobin interfere with the HbA1C  assay. Heterozygous hemoglobin variants (HbS, HgC, etc)do  not significantly interfere with this assay.   However, presence of multiple variants may affect accuracy.     11/22/2021 5.4 4.0 - 5.6 % Final     Comment:      ADA Screening Guidelines:  5.7-6.4%  Consistent with prediabetes  >or=6.5%  Consistent with diabetes    High levels of fetal hemoglobin interfere with the HbA1C  assay. Heterozygous hemoglobin variants (HbS, HgC, etc)do  not significantly interfere with this assay.   However, presence of multiple variants may affect accuracy.     01/08/2021 5.3 4.0 - 5.6 % Final     Comment:     ADA Screening Guidelines:  5.7-6.4%  Consistent with prediabetes  >or=6.5%  Consistent with diabetes  High levels of fetal hemoglobin interfere with the HbA1C  assay. Heterozygous hemoglobin variants (HbS, HgC, etc)do  not significantly interfere with this assay.   However, presence of multiple variants may affect accuracy.          Cardiac Enzymes: Ejection Fractions:    No results for input(s): CPK, CPKMB, MB, TROPONINI in the last 72 hours. EF   Date Value Ref Range Status   10/17/2022 65 % Final          No results for input(s): COLORU, APPEARANCEUA, PHUR, SPECGRAV, PROTEINUA, GLUCUA, KETONESU, BILIRUBINUA, OCCULTUA, NITRITE, UROBILINOGEN, LEUKOCYTESUR, RBCUA, WBCUA, BACTERIA, SQUAMEPITHEL, HYALINECASTS in the last 48 hours.    Invalid input(s): WRIGHTSUR    Procalcitonin (ng/mL)   Date Value   10/14/2022 0.12   09/24/2022 0.06     Lactate (Lactic Acid) (mmol/L)   Date Value   10/23/2022 0.9   10/17/2022 0.9   09/24/2022 0.7     BNP (pg/mL)   Date Value   10/23/2022 140 (H)   10/20/2022 335 (H)   10/17/2022 188 (H)   10/14/2022 281 (H)   09/24/2022 109 (H)     CRP (mg/L)   Date Value   04/06/2022 2.8     Sed Rate (mm/Hr)   Date Value   04/06/2022 54 (H)     D-Dimer (mg/L FEU)   Date Value   10/14/2022 1.96 (H)     Ferritin (ng/mL)   Date Value   10/30/2022 374 (H)     No results found for: LDH  Troponin I (ng/mL)   Date Value   10/23/2022 0.008   10/17/2022 0.052 (H)   10/17/2022 0.061 (H)   10/14/2022 <0.006   09/24/2022 0.006   04/18/2022 <0.006   08/13/2019 <0.006   04/28/2019 0.019     CPK (U/L)   Date Value   11/06/2022 92    04/18/2022 362 (H)   01/08/2021 317 (H)   04/28/2019 486 (H)   04/28/2019 484 (H)   04/27/2019 274 (H)   12/08/2016 216 (H)   07/30/2016 354 (H)     No results found for this or any previous visit.  SARS-CoV2 (COVID-19) Qualitative PCR (no units)   Date Value   10/24/2022 Not Detected   02/14/2022 Not Detected   09/25/2020 Not Detected   05/21/2020 Not Detected     SARS-CoV-2 RNA, Amplification, Qual (no units)   Date Value   10/17/2022 Negative     POC Rapid COVID (no units)   Date Value   09/24/2022 Negative       Microbiology labs for the last week  Microbiology Results (last 7 days)       ** No results found for the last 168 hours. **            Reviewed and noted in plan where applicable- Please see chart for full lab data.    Lines/Drains:  Trialysis (Dialysis) Catheter 10/25/22 2329 left internal jugular (Active)   Line Necessity Review CRRT/HD 11/13/22 2100   Verification by X-ray Yes 11/13/22 2100   Site Assessment No drainage;No swelling;No redness;No warmth 11/17/22 0945   Line Securement Device Secured with sutures 11/17/22 0945   Dressing Type Biopatch in place;Transparent (Tegaderm) 11/17/22 0945   Dressing Status Clean;Dry;Intact 11/17/22 0945   Dressing Intervention Integrity maintained 11/17/22 0945   Date on Dressing 11/09/22 11/15/22 1042   Dressing Due to be Changed 11/16/22 11/15/22 1042   Venous Patency/Care flushed w/o difficulty 11/15/22 1042   Arterial Patency/Care flushed w/o difficulty 11/15/22 1042   Distal Patency/Care flushed w/o difficulty 11/15/22 1042   Flows Good 11/11/22 1320   Waveform Not being transduced 11/10/22 1901   Number of days: 22            Midline Catheter Insertion/Assessment  - Single Lumen 10/18/22 1831 Left brachial vein 18g x 10cm (Active)   Site Assessment Clean;Dry;Intact 11/17/22 0945   IV Device Securement catheter securement device 11/17/22 0945   Line Status Saline locked 11/17/22 0945   Dressing Type Biopatch in place;Transparent (Tegaderm) 11/17/22 0945    Dressing Status Clean;Dry;Intact 11/17/22 0945   Dressing Intervention Integrity maintained 11/17/22 0945   Dressing Change Due 11/22/22 11/15/22 2000   Site Change Due 11/15/22 11/15/22 2000   Reason Not Rotated Poor venous access 11/15/22 2000   Number of days: 29       Imaging  ECG Results              EKG 12-lead (Final result)  Result time 10/17/22 14:26:15      Final result by Interface, Lab In [facility] (10/17/22 14:26:15)               Narrative:    Test Reason : R06.02,    Vent. Rate : 093 BPM     Atrial Rate : 093 BPM     P-R Int : 126 ms          QRS Dur : 070 ms      QT Int : 356 ms       P-R-T Axes : 048 052 030 degrees     QTc Int : 442 ms    Normal sinus rhythm  Normal ECG  When compared with ECG of 14-OCT-2022 14:26,  Limb lead reversal has been corrected  Confirmed by Jc Barbosa MD ([id number]) on 10/17/2022 2:26:04 PM    Referred By: [email or web address]   SELF           Confirmed By:Jc Barbosa MD                                  Results for orders placed during the hospital encounter of 10/17/22    Echo    Interpretation Summary  · The left ventricle is normal in size with normal systolic function. The estimated ejection fraction is 65%.  · Normal right ventricular size with normal right ventricular systolic function.  · Grade I left ventricular diastolic dysfunction.  · Mild tricuspid regurgitation.  · There is pulmonary hypertension. The estimated PA systolic pressure is 56 mmHg.  · Normal to low central venous pressure (3 mmHg).      X-Ray Abdomen AP 1 View  Narrative: EXAMINATION:  XR ABDOMEN AP 1 VIEW    CLINICAL HISTORY:  NGT placement; Dysphagia, unspecified    TECHNIQUE:  AP View(s) of the abdomen was performed.    COMPARISON:  No 11/20/2022 ne    FINDINGS:  Feeding tube in the stomach.  No significant bowel dilatation.  Impression: See above    Electronically signed by: Ej Strickland MD  Date:    11/14/2022  Time:    11:02      Labs, Imaging, EKG and Diagnostic results from 11/21/2022 were  reviewed.    Medications:  Medication list was reviewed and changes noted under Assessment/Plan.  Current Facility-Administered Medications on File Prior to Encounter   Medication Dose Route Frequency Provider Last Rate Last Admin    celecoxib capsule 400 mg  400 mg Oral Once Dieudonne Marshall MD        fentaNYL 50 mcg/mL injection  mcg   mcg Intravenous PRN Dieudonne Marshall MD   100 mcg at 12/21/21 0919    LIDOcaine (PF) 10 mg/ml (1%) injection 10 mg  1 mL Intradermal Once PRN Dieudonne Marshall MD        LIDOcaine (PF) 10 mg/ml (1%) injection 10 mg  1 mL Intradermal Once Dieudonne Marshall MD        midazolam (VERSED) 1 mg/mL injection 0.5-4 mg  0.5-4 mg Intravenous PRN Dieudonne Marshall MD   2 mg at 12/21/21 0919    ropivacaine 0.2% [facility] PainPRO Pump infusion 500 ML   Perineural Continuous Dieudonne Marshall MD   New Bag at 12/21/21 1153     Current Outpatient Medications on File Prior to Encounter   Medication Sig Dispense Refill    ACETAMINOPHEN (TYLENOL ARTHRITIS PAIN ORAL) Take 1 tablet by mouth once daily.       albuterol (VENTOLIN HFA) 90 mcg/actuation inhaler Inhale 2 puffs into the lungs every 6 (six) hours as needed for Shortness of Breath. Rescue 18 g 0    amLODIPine (NORVASC) 10 MG tablet Take 1 tablet (10 mg total) by mouth once daily. 90 tablet 3    aspirin 325 MG tablet Take 1 tablet at lunch daily starting after surgery for 6 weeks to prevent DVT. 42 tablet 0    diclofenac sodium (VOLTAREN) 1 % Gel Apply 2 g topically 4 (four) times daily. for 10 days 400 g 0    epinastine 0.05 % ophthalmic solution Place 1 drop into both eyes once daily.       EUTHYROX 25 mcg tablet Take 1 tablet by mouth once daily 90 tablet 0    fish,bora,flax oils-om3,6,9no1 (OMEGA 3-6-9) 1,200 mg Cap Take 1 each by mouth once daily. 30 capsule 3    fluticasone propionate (FLONASE) 50 mcg/actuation nasal spray 1 spray (50 mcg total) by Each Nostril route 2 (two) times daily. 16 g 0    FLUZONE  HIGHDOSE QUAD 20-21  mcg/0.7 mL Syrg ADM 0.7ML IM UTD      hydrALAZINE (APRESOLINE) 100 MG tablet TAKE 1 TABLET BY MOUTH THREE TIMES DAILY 90 tablet 0    HYDROcodone-acetaminophen (NORCO) 5-325 mg per tablet Take 1 tablet by mouth every 6 (six) hours as needed.      ibuprofen (ADVIL,MOTRIN) 800 MG tablet Take 800 mg by mouth every 8 (eight) hours as needed.      levocetirizine (XYZAL) 5 MG tablet Take 1 tablet (5 mg total) by mouth every evening. For sinus 30 tablet 0    lisinopriL (PRINIVIL,ZESTRIL) 40 MG tablet Take 1 tablet by mouth once daily 90 tablet 0    meloxicam (MOBIC) 15 MG tablet Take 1 tablet (15 mg total) by mouth daily as needed (arthritis). 30 tablet 2    methocarbamoL (ROBAXIN) 500 MG Tab Take 1 tablet (500 mg total) by mouth 3 (three) times daily as needed (muscle spasms). 40 tablet 0    metoprolol succinate (TOPROL-XL) 50 MG 24 hr tablet Take 1 tablet by mouth once daily 90 tablet 0    mupirocin (BACTROBAN) 2 % ointment Apply topically 2 (two) times daily.      tiZANidine (ZANAFLEX) 4 MG tablet Take 1 tablet by mouth twice daily as needed 20 tablet 0     Scheduled Medications:  sodium chloride 0.9%, , Intravenous, Once  aluminum-magnesium hydroxide-simethicone, 30 mL, Oral, Once   And  LIDOcaine HCl 2%, 15 mL, Oral, Once  amLODIPine, 10 mg, Per NG tube, Daily  atovaquone, 1,500 mg, Per NG tube, Daily  carvediloL, 25 mg, Per NG tube, BID  hydrALAZINE, 100 mg, Per NG tube, Q8H  levothyroxine, 25 mcg, Per NG tube, Before breakfast  nystatin, 500,000 Units, Oral, QID  pantoprazole, 40 mg, Oral, BID AC  predniSONE, 20 mg, Oral, Daily   Followed by  [START ON 12/4/2022] predniSONE, 12.5 mg, Oral, Daily   Followed by  [START ON 12/18/2022] predniSONE, 10 mg, Oral, Daily   Followed by  [START ON 1/1/2023] predniSONE, 5 mg, Oral, Daily  QUEtiapine, 25 mg, Oral, QHS  sodium bicarbonate, 650 mg, Oral, BID  sodium chloride 0.9% flush bag IVPB, , Intravenous, 1 time in Clinic/HOD  sodium zirconium  cyclosilicate, 5 g, Oral, Daily    PRN: sodium chloride, sodium chloride, acetaminophen, albuterol-ipratropium, aluminum-magnesium hydroxide-simethicone, bisacodyL, cloNIDine, dextrose 10%, dextrose 10%, glucagon (human recombinant), glucose, glucose, heparin (porcine), heparin, porcine (PF), heparin, porcine (PF), insulin aspart U-100, melatonin, methocarbamoL, naloxone, ondansetron, prochlorperazine, simethicone, sodium chloride 0.9%, sodium chloride 0.9%, sodium chloride 0.9%, sodium chloride 0.9%, sodium chloride 0.9%  Infusions:   Estimated Creatinine Clearance: 3.9 mL/min (A) (based on SCr of 10.1 mg/dL (H)).    Assessment/Plan:      * Microscopic polyangiitis  As of 10/26/22 strongly positive MPO Ab (in contrast to borderline positive PR3), consistent with clinical picture of microscopic polyangiitis with pulmonary, and renal involvement after presenting with acute hypoxemic respiratory failure, hemoptysis, and acute renal failure.  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   3)  CHANGES SUGGESTIVE OF FOCAL ACUTE PYELONEPHRITIS.   4)  MILD-TO-MODERATE ARTERIONEPHROSCLEROSIS   - Rheumatology consulted, appreciate evaluation and recommendations     - MITUL + 1:2560 homogenous, +dsDNA, normal complements     - PR3 1.2 (slight +),  (+)     - cANCA + 1:80, pANCA neg     - GBMAb neg, trace cryos  - Transfusion Medicine consulted for apheresis  - Nephrology consulted, see separate documentation for WILFREDO      Plan  - Steroids:    - s/p IV Solumedrol 1000 mg x3 doses. Now following PEXIVAS  steroid taper. Currently on IV Solumedrol 20 mg daily.   - plan for 20mg IV daily on 11/6 for 14 days (last dose of 20mg equivalent is 11/20/2022).   - apheresis: underwent PLEX 10/26, 10/27, 10/30, 11/1, 11/2, 11/3  - rituximab every 7 days for 4 doses: Dose #1: 10/27, #2 11/3, #3 11/10; Next Rituximab 375 mg/m^2 due Nov 17th   - cyclophosphamide every 14 days for 2 doses: 10/27, 11/10  - Opportunistic Infection ppx: atovaquone 1500mg PO daily  - GI bleed prophylaxis: pantoprazole 40mg daily   11/17 Rheumatology following for steroid dosing and chemotherapeutic agents. on IV steroids due to p.o. intake issues, however can transition to p.o. when swallowing issues reliably resolved - on steroid taper - solumedrol .  rituximab dose due November 17 11/18  switched to prednisone taper by nephrology          Gastric reflux  PPI BID  Elevated HOB; feeds changed to bolus and tolerating well  Symptoms initially improved however reoccurrence on 11/13 without significant dietary changes; GI planning for EGD 11/14  TF further adjusted for smaller, more frequent bolus   s/p EGD 11/14; Normal Study      High risk medications (not anticoagulants) long-term use  as above      Anuria    11/17 Nephrology following for HD and  monitoring for renal recovery with some  improvement in UOP in last 2 days. will need PermCath and HD chair set up if no renal  recovery.     11/19  mL /24h.      Gastrointestinal hemorrhage with melena  Starting having hemoptysis and melena with associated acute blood loss anemia earlier in hospital stay. Patient with known internal hemorrhoids, mild sigmoid diverticulosis, history of gastritis and + H pylori (2012 EGD).   - GI consulted 10/19, unable to perform EGD due to instability and respiratory failure at the time    Plan  - patient unable to take PO PPI due to NGT removal on 11/5, transition to IV PPI 40mg pantoprazole daily, return to per NG tube once replaced  - s/p EGD 11/14; Normal Study  -  PPI transitioned to BID as concern for GERD  - Monitor CBC closely for signs of recurrent bleed  11/17 Hb trended dopwn to 6.6. Transfusion with 1 unit of PRBC .  111/8  continue protonix oral     Dysphagia  SLP following  MBSS showing global weakness in swallowing as well as aspiration with thin liquids.   ENT consulted but patient did not tolerate laryngoscopy     Plan   - Discussed case with Rheumatology team, they are concerned that this dysphagia may have been from prior stroke, requesting imaging for evaluation-  CT demonstrated no acute findings or causes for dysphagia NOR did MRI   - removed NGT and place dobhoff which is more comfortable and makes swallowing easier compared to NGT.    - continue working with speech therapy   -exam concerning for thrush; nystatin swish and swallow initiated; GI consulted as concern that oropharyngeal candidiasis may be contributing to dysphagia  -Per GI, Lower suspicion for esophageal candidiasis without odynophagia however can If white plaques have been seen on oropharynx, ok to start fluconazole empirically for possible esophageal candidiasis  -EGD on 11/14 without abnormality; per GI, Suspect some aspect of esophageal dysmotility in setting of critical illness. No further testing at this time.  -diet initiated per SLP on 11/16; NG tube removed  -continue to monitor p.o. intake closely  11/17 SLP recs Mechanical soft, Liquid Diet Level: Thin     Moderate malnutrition  Nutrition consulted. Most recent weight and BMI monitored-          Malnutrition (Moderate to Severe)  Weight Loss (Malnutrition): 7.5% in 3 months              Measurements:  Wt Readings from Last 1 Encounters:   11/14/22 57.6 kg (127 lb)   Body mass index is 24 kg/m².    Recommendations: Recommendation/Intervention: 1. As tolerated, increase TF rate (of Novasource) to 35 mL/hr  - 2. RD to monitor & follow-up.  Goals: Meet % EEN, EPN by RD f/u date    Patient has been screened and assessed by TOMAS. TOMAS  will follow patient.      Encounter for continuous renal replacement therapy (CRRT) for acute renal failure  Due to microscopic polyangiitis. Remains on hemodialysis intermittently.   - Baseline creatinine 1.0-1.2.   - New onset proteinuria/hematuria.   - Renal biopsy completed and   - Trialysis in place for intermittent Hemodialysis    Plan  - Nephrology consulted, appreciate management  - management of MPA as noted separately  - Renal function panel daily  - renally dose all medications  - intermittent Hemodialysis as indicated, per Nephrology   11/17     Recent Labs   Lab 11/18/22  1608 11/19/22  0610 11/20/22  0443   BUN 98* 102* 113*   CREATININE 8.7* 9.3* 9.8*     . Nephrology following for HD and  monitoring for renal recovery with some  improvement in UOP in last 2 days. will need PermCath and HD chair set up if no renal  recovery.  likely SNF pending nephro decision regarding HD   11/20  mL /24h. BUN/CR trending up to 113/9.8 , started on sodium bicarb for bicarb 19. continue lokelma 5g daily for 5.1     Acute hypoxemic respiratory failure  Multifocal pneumonia  Hemoptysis  Dyspnea  Diffuse Alveolar Hemorrahge  In the setting of a new diagnosis of microscopic polyangiitis, presented with fevers, dyspnea,.  - Chest imaging was consistent with diffuse alveolar hemorrhage.  CT chest wc 10/17 with bl perihilar dense consolidations w/ peripheral patcy areas of GGO, BL PNA, less c/f cardiogenic pulmonary edema.  - Patient upgraded to Medical ICU 10/23/22 with abrupt respiratory decline requiring NIPPV, improved with high dose IV steroids, plasmapheresis, emergent hemodialysis.   - Unable to perform bronchoscopy in the Medical ICU due to tenuous respiratory status  - As of 11/6, hypoxemia has significantly improved    Plan:  - weaned to room air  - continue management of underlying triggers with steroid and immunosuppressants  - incentive spirometry and respiratory hygiene  11/17 sats 92% on  RA    Lymphadenopathy of head and neck  - Has bilateral neck gland swelling with tenderness,consulted ENT  - No surgical intervention indicated   - Adenopathy likely reactive in nature   - Recommend further workup if it does not resolve within next 2 weeks     Hyponatremia  Has hyponatremia,started on NS. was given 2 grams of sodium chloride tablets TID for likely SIADH  11/18  sodium normalized at 137. salt tablets discontinued.      Acute blood loss anemia  In the setting of GI bleed with melena  - Evaluated by GI, see GI bleed documentation  - Last transfusion 10/28, Hgb slowly trending down since then  - Hgb 6.9 on 11/5      Plan  - Monitor CBC Daily   - Treat with pRBC transfusion PRN Hgb <7  - qualifies for transfusion on 11/5 with Hgb 6.9, 1u pRBC ordered  -stable   11/17 Hb trended dopwn to 6.6. Transfusion with 1 unit of PRBC .consider GI eval    Current CBC reviewed-    Recent Labs   Lab 11/18/22  0944 11/18/22  1608 11/19/22  0610   HGB 9.5* 8.7* 7.8*       Chronic diastolic heart failure  Pulmonary Hypertension due to left heart disease  TTE (10/2022) EF 65%, G1DD  Home meds: Lisinopril, Toprol     No signs or symptoms to suggest acute exacerbation    Plan  - Active volume control with intermittent Hemodialysis per Nephrology   - Daily weights (standing if tolerated)  - Strict I/Os  - Fluid restriction 1.5 day  - Cardiac diet w/ 2 g Na restriction      Hyperkalemia    resolved    Primary hypertension  BP Readings from Last 1 Encounters:   11/18/22 (Abnormal) 161/75     - Patient is unable to tolerate PO mediations, all meds to be administered via NG tube  -Will continue to monitor blood pressure trend closely and adjust antihypertensive regimen as clinically indicated and tolerated.    amLODIPine tablet 10 mg, 10 mg, Per NG tube, Daily    carvediloL tablet 12.5 mg, 12.5 mg, Per NG tube, BID    hydrALAZINE tablet 25 mg, 25 mg, Per NG tube, Q8H    lisinopriL tablet 40 mg, 40 mg, Per NG tube,  Daily      Hypothyroid  - Continue synthroid 25 mcg  - TSH 0.585 10/27/22    VTE Risk Mitigation (From admission, onward)           Ordered     heparin, porcine (PF) 100 unit/mL injection flush 500 Units  As needed (PRN)         11/17/22 1343     heparin, porcine (PF) 100 unit/mL injection flush 500 Units  As needed (PRN)         11/10/22 1430     heparin (porcine) injection 1,000 Units  As needed (PRN)         11/09/22 1601     heparin (porcine) injection 1,000 Units  As needed (PRN)         11/08/22 0854     Place sequential compression device  Until discontinued         10/25/22 1731     IP VTE HIGH RISK PATIENT  Once         10/17/22 1354     Reason for No Pharmacological VTE Prophylaxis  Once        Question:  Reasons:  Answer:  Risk of Bleeding    10/17/22 1354                    Discharge Planning   ANTHONY: 11/23/2022     Code Status: Full Code   Is the patient medically ready for discharge?: No    Reason for patient still in hospital (select all that apply): Treatment  Discharge Plan A: Skilled Nursing Facility   Discharge Delays: None known at this time              West Quinn MD  Department of Hospital Medicine   [facility] - Intensive Care ([place]-14)

## 2022-11-21 NOTE — ASSESSMENT & PLAN NOTE
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  PLEX 10/26, 10/27, 10/29, 10/30, 11/1, 11/2, 11/3 (prior to Rituxan)  Rituximab 375 mg/m^2 (600 mg) on 10/27 11/3, 11/10 and 11/17 (completed 4 doses)  Cyclophosphamide 7.5 mg/kg on 10/27 and 11/10 ( every 14 days ); next dose on the 24th  Serum BUN/Cr continue trending up, currently at 112/10.2 on most recent labs   Now following PEXIVAS steroid taper  Continue Atovaquone prophylaxis  Started making some urine; UOP of 900mLs over the past 24h  Holding off HD and evaluating daily; may need additional dialysis for clearance   No emergent indication for hemodialysis at this time, will continue monitoring

## 2022-11-21 NOTE — PT/OT/SLP PROGRESS
"Physical Therapy Treatment    Patient Name:  Kristin Goodman   MRN:  [id number]    Recommendations:     Discharge Recommendations:  nursing facility, skilled   Discharge Equipment Recommendations: bedside commode   Barriers to discharge:  Pt requires increased assistance with mobility at this time.     Assessment:     Kristin Goodman is a 77 y.o. female admitted with a medical diagnosis of Microscopic polyangiitis.  She presents with the following impairments/functional limitations:  weakness, impaired endurance, impaired functional mobility, impaired balance, gait instability, decreased lower extremity function, pain Pt reluctantly agreed to therapy today. Pt independently completed exercises sitting up in chair. Pt was able to stand up using RW @ mod A with unstable stance. Pt had increase dizziness and sat back down @ min A. Pt appears malaise and has discomfort due to inability to perform BM for 5 days. Pt will continue to benefit from skilled acute PT to improve strength to perform functional tasks.    Rehab Prognosis: Good; patient would benefit from acute skilled PT services to address these deficits and reach maximum level of function.    Recent Surgery: Procedure(s) (LRB):  EGD (ESOPHAGOGASTRODUODENOSCOPY) (N/A) 7 Days Post-Op    Plan:     During this hospitalization, patient to be seen 3 x/week to address the identified rehab impairments via therapeutic activities, therapeutic exercises, neuromuscular re-education and progress toward the following goals:    Plan of Care Expires:  11/30/22    Subjective     Chief Complaint: None  Patient/Family Comments/goals: "Give me a break"  Pain/Comfort:  Pain Rating 1: 0/10      Objective:     Communicated with nurse prior to session.  Patient found up in chair with telemetry upon PT entry to room.     General Precautions: Standard, fall   Orthopedic Precautions:N/A   Braces: N/A  Respiratory Status: Room air     Functional Mobility:  Transfers:     Sit to Stand:  moderate " assistance with rolling walker  Balance: Fair Static standing balance  Therapeutic exercises: marching, knee extension, heel/toe raises x10 reps      AM-PAC 6 CLICK MOBILITY  Turning over in bed (including adjusting bedclothes, sheets and blankets)?: 3  Sitting down on and standing up from a chair with arms (e.g., wheelchair, bedside commode, etc.): 3  Moving to and from a bed to a chair (including a wheelchair)?: 3  Need to walk in hospital room?: 3  Climbing 3-5 steps with a railing?:  (not done)       Treatment & Education:  Pt was educated on importance of participating in gait training to increase metabolism which may aid in BM.    Patient left up in chair with call button in reach, nurse notified, and sister present..    GOALS:   Multidisciplinary Problems       Physical Therapy Goals          Problem: Physical Therapy    Goal Priority Disciplines Outcome Goal Variances Interventions   Physical Therapy Goal     PT, PT/OT Ongoing, Progressing     Description: Goals to be met by: 2022     Patient will increase functional independence with mobility by performin. Supine to sit with contact guard assistance  2. Sit to supine with contact guard assistance  3. Sit to stand transfer with minimum assistance MET   3a. STS supvn LRAD  4. Gait  x 40 feet with minimum assistance using LRAD as needed  5. Lower extremity exercise program x10 reps per handout, with independence                        Time Tracking:     PT Received On: 22  PT Start Time: 1246     PT Stop Time: 1303  PT Total Time (min): 17 min     Billable Minutes: Therapeutic Activity 17     Treatment Type: Treatment  PT/PTA: PTA     PTA Visit Number: 1     2022

## 2022-11-21 NOTE — PROGRESS NOTES
"[facility] - Intensive Care ([place] [address])  Rheumatology  Progress Note    Patient Name: Kristin Goodman  MRN: [id number]  Admission Date: 10/17/2022  Hospital Length of Stay: 35 days  Code Status: Full Code   Attending Provider: West Quinn MD  Primary Care Physician: Lori Hernandez MD  Principal Problem: Microscopic polyangiitis    Subjective:     HPI: Patient is a 76 yo F with chronic diastolic heart failure, HTN, OA, who is admitted for respiratory failure. Rheumatology is consulted due to concern for vasculitis. Patient initially presented to the ED on 9/24/22 complaining of a week of cough followed by an episode of hemoptysis on 9/24 prompting the ED visit. They did a CTA which showed multifocal PNA. She was sent home with Levaquin. She followed up with PCP on 10/4/22 and was feeling better. Then she presented to the ED again on 10/14 and on 10/17 complaining of dyspnea. She had fevers in the few days leading up to admission of 102. She endorsed weakness, productive cough, dyspnea, and loss of appetite. Pt initally at 88% O2 saturation and improved to 98% on 2L NC. She was admitted for IV antibiotics. CT chest with contrast on 10/17 showed evidence of interval progression of bilateral perihilar dense consolidation with peripheral patchy areas of ground-glass opacification nonspecific as to the etiology.  Bilateral pneumonia as well as inflammatory etiologies considered.  Cardiogenic pulmonary edema considered less likely given the pattern.    On 10/19/22 she started having melena. Hb dropped to 6. She got 2 units pRBCs. GI was consulted. They noted "Colonoscopy in 2020 showed internal hemorrhoids and mild sigmoid diverticulosis. EGD in 2012 showed gastritis, biopsies positive for H. Pylori." "We considered doing EGD now, but from a pulmonary standpoint I do not think she is stable enough with the current pneumonia, therefore would recommend that we just follow the patient, treat with a PPI in case there " "is any peptic ulcer disease."    On 10/21/22 ENT was consulted due to left sided otalgia the day prior which resolved. She also was complaining of cervical adenopathy and sore throat. They did not recommend surgical intervention and felt that adenopathy was likely reactive.     On 10/23/22 ID was consulted. They recommended checking respiratory infection panel and fungal markers.    On 10/23/22 Nephrology was consulted due to worsening creatinine. They noted, "Patient is a noted to have baseline creatinine of 1 as recent as 8/2022 but progressive worsening of creatinine in early October.  On admission patient with creatinine of 1.6 (10/17) with subsequent progression and creatinine of 3.0 at time of consultation (10/23).  Nephrology consulted for acute kidney injury." "Patient with ATN nephritic picture in urine sediment, prot/crea ratio pending. Had hematuria in recent past but in context with positive urine cultures. Now definitely more dysmorphic red cells that wbcs in the urine. 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    On 10/23/22, she was transferred to ICU due to desaturations and respiratory distress. Pulmonologist noted that her respiratory status is too tenuous for bronchoscopy. She was stabilized with non-invasive ventilation and nasal cannula oxygen.      Interval History: Patient doing well at this time. No acute events overnight.     Current Facility-Administered Medications   Medication Frequency    0.9%  NaCl infusion (for blood administration) Q24H PRN    0.9%  NaCl infusion (for blood administration) Q24H PRN    0.9%  NaCl infusion Once    acetaminophen tablet 650 mg Q4H PRN    albuterol-ipratropium 2.5 mg-0.5 mg/3 mL nebulizer solution 3 mL Q4H PRN    aluminum-magnesium hydroxide-simethicone 200-200-20 mg/5 mL suspension 30 mL QID PRN    aluminum-magnesium " hydroxide-simethicone 200-200-20 mg/5 mL suspension 30 mL Once    And    LIDOcaine HCl 2% oral solution 15 mL Once    amLODIPine tablet 10 mg Daily    atovaquone 750 mg/5 mL oral liquid 1,500 mg Daily    bisacodyL suppository 10 mg Daily PRN    carvediloL tablet 25 mg BID    cloNIDine tablet 0.1 mg Q6H PRN    dextrose 10% bolus 125 mL PRN    dextrose 10% bolus 250 mL PRN    glucagon (human recombinant) injection 1 mg PRN    glucose chewable tablet 16 g PRN    glucose chewable tablet 24 g PRN    heparin (porcine) injection 1,000 Units PRN    heparin, porcine (PF) 100 unit/mL injection flush 500 Units PRN    heparin, porcine (PF) 100 unit/mL injection flush 500 Units PRN    hydrALAZINE tablet 100 mg Q8H    insulin aspart U-100 pen 1-10 Units Q6H PRN    levothyroxine tablet 25 mcg Before breakfast    melatonin tablet 6 mg Nightly PRN    methocarbamoL tablet 500 mg TID PRN    naloxone 0.4 mg/mL injection 0.02 mg PRN    nystatin 100,000 unit/mL suspension 500,000 Units QID    ondansetron injection 4 mg Q8H PRN    pantoprazole EC tablet 40 mg BID AC    predniSONE tablet 20 mg Daily    Followed by    [START ON 12/4/2022] predniSONE tablet 12.5 mg Daily    Followed by    [START ON 12/18/2022] predniSONE tablet 10 mg Daily    Followed by    [START ON 1/1/2023] predniSONE tablet 5 mg Daily    prochlorperazine injection Soln 5 mg Q6H PRN    QUEtiapine tablet 25 mg QHS    simethicone chewable tablet 80 mg QID PRN    sodium bicarbonate tablet 650 mg BID    sodium chloride 0.9% 250 mL flush bag 1 time in Clinic/HOD    sodium chloride 0.9% flush 10 mL PRN    sodium chloride 0.9% flush 10 mL PRN    sodium chloride 0.9% flush 10 mL PRN    sodium chloride 0.9% flush 10 mL PRN    sodium chloride 0.9% flush 5 mL PRN    sodium zirconium cyclosilicate packet 5 g Daily     Facility-Administered Medications Ordered in Other Encounters   Medication Frequency    celecoxib capsule 400 mg Once    fentaNYL 50 mcg/mL injection  mcg PRN     LIDOcaine (PF) 10 mg/ml (1%) injection 10 mg Once PRN    LIDOcaine (PF) 10 mg/ml (1%) injection 10 mg Once    midazolam (VERSED) 1 mg/mL injection 0.5-4 mg PRN    ropivacaine 0.2% [facility] PainPRO Pump infusion 500 ML Continuous     Objective:     Vital Signs (Most Recent):  Temp: 98.7 °F (37.1 °C) (11/21/22 1130)  Pulse: 60 (11/21/22 1130)  Resp: 18 (11/21/22 1130)  BP: 123/60 (11/21/22 1130)  SpO2: 98 % (11/21/22 1130)  O2 Device (Oxygen Therapy): room air (11/21/22 1130) Vital Signs (24h Range):  Temp:  [97.2 °F (36.2 °C)-98.9 °F (37.2 °C)] 98.7 °F (37.1 °C)  Pulse:  [60-69] 60  Resp:  [14-18] 18  SpO2:  [93 %-99 %] 98 %  BP: (109-134)/(55-63) 123/60     Weight: 57.6 kg (127 lb) (11/14/22 0958)  Body mass index is 24 kg/m².  Body surface area is 1.57 meters squared.      Intake/Output Summary (Last 24 hours) at 11/21/2022 1601  Last data filed at 11/21/2022 1434  Gross per 24 hour   Intake 120 ml   Output 800 ml   Net -680 ml       Physical Exam   Constitutional: She is oriented to person, place, and time. normal appearance.  Non-toxic appearance. No distress. She does not appear ill.   HENT:   Head: Normocephalic and atraumatic.   Nose: Nose normal. No rhinorrhea or nasal congestion.   Mouth/Throat: Mucous membranes are moist. No oropharyngeal exudate or posterior oropharyngeal erythema. Oropharynx is clear.   Eyes: Pupils are equal, round, and reactive to light. Conjunctivae are normal. Right eye exhibits no discharge. Left eye exhibits no discharge. No scleral icterus.   Cardiovascular: Normal rate, regular rhythm and normal pulses. Exam reveals no gallop and no friction rub.   No murmur heard.  Pulmonary/Chest: Effort normal and breath sounds normal. No stridor. No respiratory distress. She has no wheezes. She has no rhonchi. She has no rales. She exhibits no tenderness.   Abdominal: Bowel sounds are normal. She exhibits no distension and no mass. There is no abdominal tenderness. There is no rebound and no  guarding. No hernia.   Musculoskeletal:         General: No swelling, tenderness, deformity or signs of injury. Normal range of motion.      Right lower leg: No edema.      Left lower leg: No edema.   Neurological: She is alert and oriented to person, place, and time. She displays no weakness and normal reflexes. No cranial nerve deficit or sensory deficit. Coordination and gait normal.   Skin: Skin is warm. No bruising, no lesion and no rash noted. She is not diaphoretic. No erythema. No jaundice or pallor.   Vitals reviewed.    Significant Labs:  All pertinent lab results from the last 24 hours have been reviewed.    Significant Imaging:  Imaging results within the past 24 hours have been reviewed.    Assessment/Plan:     Acute hypoxemic respiratory failure  Patient is a 78 yo F with chronic diastolic heart failure, HTN, OA, who is admitted for respiratory failure. Rheumatology is consulted due to concern for vasculitis. Patient presented with cough and hemoptysis since 9/24/22. She was admitted due to dyspnea and hypoxia on 10/17. She has had Hb drop to 6 this admission requiring blood transfusions. Reviewed her chest imaging which shows progressive disease from 10/14 until now which can be concerning for DAH. This might also explain the Hb drop. No bronch planned for now due to her tenuous respiratory status. S/p upper endoscopy with GI 11/14. She has had increasing creatinine from baseline of 1 to 3.0 on 10/23/22.     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   2)  NECROTIZING CRESCENTIC GLOMERULONEPHRITIS, CONSISTENT WITH A CONCURRENT   PAUCI-IMMUNE NECROTIZING  CRESCENTIC GLOMERULONEPHRITIS.   3)  CHANGES SUGGESTIVE OF FOCAL ACUTE PYELONEPHRITIS.   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     Treatment to date  - s/p IV Solumedrol 1000 mg x3 doses. Now following PEXIVAS steroid taper. Currently on PO prednisone 20 mg  - PLEX 10/26, 10/27, 10/29, 10/30, 11/1, 11/2, 11/3 (prior to Rituxan)  - Further HD per nephrology  - Rituximab 375 mg/m^2 (600 mg) on 10/27 (every 7 day dose 1 of 4), 11/3 (dose 2 of 4), 11/10 (dose 3 of 4), 11/17 (4 of 4)  - Cyclophosphamide 750 mg/m2 on 10/27 (every 14 day dose 1 of 2), 11/10 (dose 2 of 2)    Recommendations:  Continue steroid taper per PEXIVAS trial as in the chart below.  Atovaquone 1500 mg and Protonix daily while on high dose steroids.   S/P Rituximab 375 mg/m^2 x 4  S/p Cyclophosphamide 10/27 and 11/10  Monitor CBC and CMP in 10-14 days after giving chemotherapy  Able to be discharged from a rheumatologic perspective to rehab as long as she is tolerating PO Prednisone/PPI/Atovaquone. She will need follow up with Rheumatology outpatient.                     Francisco J Ricci MD  Rheumatology  [facility] - Intensive Care ([place] [address])    RHEUMATOLOGY ATTENDING:  Patient presented, personally interviewed and examined, medical records reviewed.  77 yr old lady with recent SLE (+MITUL +dsDNA) & MPO (also +PR3) w renal bx supporting both, who has received PLEX, RTX & CTX (RITUXIVAS) & currently is on tapering prednisone doses. She developed renal failure & has been on HD & is being monitored by nephrology for decision regarding permanent access for hemodialysis as she has begun making urine, but renal fcn studies not yet improved.  She will be transferred to a Rehab facility when ok by nephrology & will be followed by Danay Aguirre & Hakeem in Rheumatology..  Findings discussed with patient and Dr. Ricci whose note and assessment I agree with.

## 2022-11-21 NOTE — SUBJECTIVE & OBJECTIVE
Interval History: Patient doing well at this time. No acute events overnight.     Current Facility-Administered Medications   Medication Frequency    0.9%  NaCl infusion (for blood administration) Q24H PRN    0.9%  NaCl infusion (for blood administration) Q24H PRN    0.9%  NaCl infusion Once    acetaminophen tablet 650 mg Q4H PRN    albuterol-ipratropium 2.5 mg-0.5 mg/3 mL nebulizer solution 3 mL Q4H PRN    aluminum-magnesium hydroxide-simethicone 200-200-20 mg/5 mL suspension 30 mL QID PRN    aluminum-magnesium hydroxide-simethicone 200-200-20 mg/5 mL suspension 30 mL Once    And    LIDOcaine HCl 2% oral solution 15 mL Once    amLODIPine tablet 10 mg Daily    atovaquone 750 mg/5 mL oral liquid 1,500 mg Daily    bisacodyL suppository 10 mg Daily PRN    carvediloL tablet 25 mg BID    cloNIDine tablet 0.1 mg Q6H PRN    dextrose 10% bolus 125 mL PRN    dextrose 10% bolus 250 mL PRN    glucagon (human recombinant) injection 1 mg PRN    glucose chewable tablet 16 g PRN    glucose chewable tablet 24 g PRN    heparin (porcine) injection 1,000 Units PRN    heparin, porcine (PF) 100 unit/mL injection flush 500 Units PRN    heparin, porcine (PF) 100 unit/mL injection flush 500 Units PRN    hydrALAZINE tablet 100 mg Q8H    insulin aspart U-100 pen 1-10 Units Q6H PRN    levothyroxine tablet 25 mcg Before breakfast    melatonin tablet 6 mg Nightly PRN    methocarbamoL tablet 500 mg TID PRN    naloxone 0.4 mg/mL injection 0.02 mg PRN    nystatin 100,000 unit/mL suspension 500,000 Units QID    ondansetron injection 4 mg Q8H PRN    pantoprazole EC tablet 40 mg BID AC    predniSONE tablet 20 mg Daily    Followed by    [START ON 12/4/2022] predniSONE tablet 12.5 mg Daily    Followed by    [START ON 12/18/2022] predniSONE tablet 10 mg Daily    Followed by    [START ON 1/1/2023] predniSONE tablet 5 mg Daily    prochlorperazine injection Soln 5 mg Q6H PRN    QUEtiapine tablet 25 mg QHS    simethicone chewable tablet 80 mg QID PRN     sodium bicarbonate tablet 650 mg BID    sodium chloride 0.9% 250 mL flush bag 1 time in Clinic/HOD    sodium chloride 0.9% flush 10 mL PRN    sodium chloride 0.9% flush 10 mL PRN    sodium chloride 0.9% flush 10 mL PRN    sodium chloride 0.9% flush 10 mL PRN    sodium chloride 0.9% flush 5 mL PRN    sodium zirconium cyclosilicate packet 5 g Daily     Facility-Administered Medications Ordered in Other Encounters   Medication Frequency    celecoxib capsule 400 mg Once    fentaNYL 50 mcg/mL injection  mcg PRN    LIDOcaine (PF) 10 mg/ml (1%) injection 10 mg Once PRN    LIDOcaine (PF) 10 mg/ml (1%) injection 10 mg Once    midazolam (VERSED) 1 mg/mL injection 0.5-4 mg PRN    ropivacaine 0.2% [facility] PainPRO Pump infusion 500 ML Continuous     Objective:     Vital Signs (Most Recent):  Temp: 98.7 °F (37.1 °C) (11/21/22 1130)  Pulse: 60 (11/21/22 1130)  Resp: 18 (11/21/22 1130)  BP: 123/60 (11/21/22 1130)  SpO2: 98 % (11/21/22 1130)  O2 Device (Oxygen Therapy): room air (11/21/22 1130) Vital Signs (24h Range):  Temp:  [97.2 °F (36.2 °C)-98.9 °F (37.2 °C)] 98.7 °F (37.1 °C)  Pulse:  [60-69] 60  Resp:  [14-18] 18  SpO2:  [93 %-99 %] 98 %  BP: (109-134)/(55-63) 123/60     Weight: 57.6 kg (127 lb) (11/14/22 0958)  Body mass index is 24 kg/m².  Body surface area is 1.57 meters squared.      Intake/Output Summary (Last 24 hours) at 11/21/2022 1601  Last data filed at 11/21/2022 1434  Gross per 24 hour   Intake 120 ml   Output 800 ml   Net -680 ml       Physical Exam   Constitutional: She is oriented to person, place, and time. normal appearance.  Non-toxic appearance. No distress. She does not appear ill.   HENT:   Head: Normocephalic and atraumatic.   Nose: Nose normal. No rhinorrhea or nasal congestion.   Mouth/Throat: Mucous membranes are moist. No oropharyngeal exudate or posterior oropharyngeal erythema. Oropharynx is clear.   Eyes: Pupils are equal, round, and reactive to light. Conjunctivae are normal. Right eye  exhibits no discharge. Left eye exhibits no discharge. No scleral icterus.   Cardiovascular: Normal rate, regular rhythm and normal pulses. Exam reveals no gallop and no friction rub.   No murmur heard.  Pulmonary/Chest: Effort normal and breath sounds normal. No stridor. No respiratory distress. She has no wheezes. She has no rhonchi. She has no rales. She exhibits no tenderness.   Abdominal: Bowel sounds are normal. She exhibits no distension and no mass. There is no abdominal tenderness. There is no rebound and no guarding. No hernia.   Musculoskeletal:         General: No swelling, tenderness, deformity or signs of injury. Normal range of motion.      Right lower leg: No edema.      Left lower leg: No edema.   Neurological: She is alert and oriented to person, place, and time. She displays no weakness and normal reflexes. No cranial nerve deficit or sensory deficit. Coordination and gait normal.   Skin: Skin is warm. No bruising, no lesion and no rash noted. She is not diaphoretic. No erythema. No jaundice or pallor.   Vitals reviewed.    Significant Labs:  All pertinent lab results from the last 24 hours have been reviewed.    Significant Imaging:  Imaging results within the past 24 hours have been reviewed.

## 2022-11-21 NOTE — ASSESSMENT & PLAN NOTE
Patient is a 78 yo F with chronic diastolic heart failure, HTN, OA, who is admitted for respiratory failure. Rheumatology is consulted due to concern for vasculitis. Patient presented with cough and hemoptysis since 9/24/22. She was admitted due to dyspnea and hypoxia on 10/17. She has had Hb drop to 6 this admission requiring blood transfusions. Reviewed her chest imaging which shows progressive disease from 10/14 until now which can be concerning for DAH. This might also explain the Hb drop. No bronch planned for now due to her tenuous respiratory status. S/p upper endoscopy with GI 11/14. She has had increasing creatinine from baseline of 1 to 3.0 on 10/23/22.     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   3)  CHANGES SUGGESTIVE OF FOCAL ACUTE PYELONEPHRITIS.   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     Treatment to date  - s/p IV Solumedrol 1000 mg x3 doses. Now following PEXIVAS steroid taper. Currently on PO prednisone 20 mg  - PLEX 10/26, 10/27, 10/29, 10/30, 11/1, 11/2, 11/3 (prior to Rituxan)  - Further HD per nephrology  - Rituximab 375 mg/m^2 (600 mg) on 10/27 (every 7 day dose 1 of 4), 11/3 (dose 2 of 4), 11/10 (dose 3 of 4), 11/17 (4 of 4)  - Cyclophosphamide 750 mg/m2 on 10/27 (every 14 day dose 1 of 2), 11/10 (dose 2 of 2)    Recommendations:  1. Continue steroid taper per PEXIVAS trial as in the chart below.  2. Atovaquone 1500 mg and Protonix daily while on high dose steroids.   3. S/P Rituximab 375  mg/m^2 x 4  4. S/p Cyclophosphamide 10/27 and 11/10  5. Monitor CBC and CMP in 10-14 days after giving chemotherapy  6. Able to be discharged from a rheumatologic perspective to rehab as long as she is tolerating PO Prednisone/PPI/Atovaquone. She will need follow up with Rheumatology outpatient.

## 2022-11-21 NOTE — PLAN OF CARE
Patient alert and oriented x 4. Daughter at bedside this morning, Sister at bedside during lunch. Patient up to chair x 2. Encouraging patient to eat. Patient agreed to eat some soup her daughter brought. Soup heated and brought to patient. Patient given suppository for no bowel movement over 2 days. Small bowel movement resulted, patient stated she felt better. Safety measures in place.

## 2022-11-21 NOTE — PT/OT/SLP PROGRESS
"Speech Language Pathology Treatment    Patient Name:  Kristin Goodman   MRN:  [id number]  Admitting Diagnosis: Microscopic polyangiitis    Recommendations:                 General Recommendations:  Dysphagia therapy  Diet recommendations:  Dental Soft, Liquid Diet Level: Thin   Aspiration Precautions: 1 bite/sip at a time, Avoid talking while eating, Eliminate distractions, Feed only when awake/alert, HOB to 90 degrees, Small bites/sips, and Strict aspiration precautions   General Precautions: Standard, fall  Communication strategies:  none    Subjective     Pt awake/alert, seated upright in bedside chair. Daughter present at bedside.     Pain/Comfort:  Pain Rating 1: 0/10  Pain Rating Post-Intervention 1: 0/10    Respiratory Status: Room air    Objective:     Has the patient been evaluated by SLP for swallowing?   Yes  Keep patient NPO? No     Pt seen bedside for ongoing SLP services. Pt and daughter report no swallowing difficulty with intake of meals/medications at this time, though pt reported an "upset stomach." SLP noted a medication cup of pills on the pt's tray table. Pt agreeable to consume po medications, swallowing several pills with water without overt s/s aspiration or airway compromise. Pt participated in limited po trials including 1 bottle-edge sip water and 1 bite of dry shiv cracker. Pt demonstrated prolonged mastication, without concern for airway compromise. SLP discussed current impressions and trial of diet upgrade to dental soft solids with possibility of returning to mechanical soft solids per pt preference to which both the pt and her daughter expressed agreement and understanding. Continue ST per POC.    Assessment:     Kristin Goodman is a 77 y.o. female with an SLP diagnosis of oral Dysphagia.     Goals:   Multidisciplinary Problems       SLP Goals          Problem: SLP    Goal Priority Disciplines Outcome   SLP Goal     SLP Ongoing, Progressing   Description: Speech Language Pathology " Goals  Goals expected to be met by 11/14/22  1. Pt will participate in ongoing assessment of swallow function to determine safest, least restrictive means of nutrition/hydration  2. Educate Pt and family on aspiration precautions and SLP POC  3. Pt will tolerate trials of nectar-thickened liquids w/o overt S/S aspiration, MIN A  4. Pt will complete dysphagia exercises to improve timing of initiation of swallow x5 with 90% accuracy, MIN A                         Plan:     Patient to be seen:  4 x/week   Plan of Care expires:  11/29/22  Plan of Care reviewed with:  patient, daughter   SLP Follow-Up:  Yes       Discharge recommendations:  nursing facility, skilled     Time Tracking:     SLP Treatment Date:   11/21/22  Speech Start Time:  0947  Speech Stop Time:  0956     Speech Total Time (min):  9 min    Billable Minutes: Treatment Swallowing Dysfunction 9  11/21/2022

## 2022-11-21 NOTE — PROGRESS NOTES
[facility] ([place] [address])  Nephrology  Progress Note    Patient Name: Kristin Goodman  MRN: [id number]  Admission Date: 10/17/2022  Hospital Length of Stay: 35 days  Attending Provider: West Quinn MD   Primary Care Physician: Lori Hernandez MD  Principal Problem:Microscopic polyangiitis    Subjective:     HPI: Kristin Goodman is a 77 year old female with hypertension, hypothyroidism, HFpEF who presents for cough, shortness of breath, and weakness.  Patient initially presented to ER on 09/24 with hemoptysis and imaging concerning for multilobar pneumonia at which time she was discharged on a 10 day course of levofloxacin.  Patient initially had some improvement but began having worsening symptoms on 10/14.  Patient describes multiple fevers and progressive shortness of breath.  She continues to have intermittent hemoptysis.  Patient with worsening leukocytosis and limited clinical improvement despite broad-spectrum antibiotics.  She remains on cefepime.  Patient is a noted to have baseline creatinine of 1 as recent as 8/2022 but progressive worsening of creatinine in early October.  On admission patient with creatinine of 1.6 (10/17) with subsequent progression and creatinine of 3.0 at time of consultation (10/23).  Nephrology consulted for acute kidney injury.      Interval History: K at 5.0 this AM. UOP of 900mLs over the past 24h. Serum creatinine continues trending up, at 10.2 this AM.     Review of patient's allergies indicates:   Allergen Reactions    Ampicillin     Peaches [peach (prunus persica)] Other (See Comments)     Pt unable to state type of reaction. Information obtained from daughter who states she was informed of allergy from patient.    Penicillins      Other reaction(s): Hives    Sulfa (sulfonamide antibiotics) Rash and Hives     Current Facility-Administered Medications   Medication Frequency    0.9%  NaCl infusion (for blood administration) Q24H PRN    0.9%  NaCl  infusion (for blood administration) Q24H PRN    0.9%  NaCl infusion Once    acetaminophen tablet 650 mg Q4H PRN    albuterol-ipratropium 2.5 mg-0.5 mg/3 mL nebulizer solution 3 mL Q4H PRN    aluminum-magnesium hydroxide-simethicone 200-200-20 mg/5 mL suspension 30 mL QID PRN    aluminum-magnesium hydroxide-simethicone 200-200-20 mg/5 mL suspension 30 mL Once    And    LIDOcaine HCl 2% oral solution 15 mL Once    amLODIPine tablet 10 mg Daily    atovaquone 750 mg/5 mL oral liquid 1,500 mg Daily    bisacodyL suppository 10 mg Daily PRN    carvediloL tablet 25 mg BID    cloNIDine tablet 0.1 mg Q6H PRN    dextrose 10% bolus 125 mL PRN    dextrose 10% bolus 250 mL PRN    glucagon (human recombinant) injection 1 mg PRN    glucose chewable tablet 16 g PRN    glucose chewable tablet 24 g PRN    heparin (porcine) injection 1,000 Units PRN    heparin, porcine (PF) 100 unit/mL injection flush 500 Units PRN    heparin, porcine (PF) 100 unit/mL injection flush 500 Units PRN    hydrALAZINE tablet 100 mg Q8H    insulin aspart U-100 pen 1-10 Units Q6H PRN    levothyroxine tablet 25 mcg Before breakfast    melatonin tablet 6 mg Nightly PRN    methocarbamoL tablet 500 mg TID PRN    naloxone 0.4 mg/mL injection 0.02 mg PRN    nystatin 100,000 unit/mL suspension 500,000 Units QID    ondansetron injection 4 mg Q8H PRN    pantoprazole EC tablet 40 mg BID AC    predniSONE tablet 20 mg Daily    Followed by    [START ON 12/4/2022] predniSONE tablet 12.5 mg Daily    Followed by    [START ON 12/18/2022] predniSONE tablet 10 mg Daily    Followed by    [START ON 1/1/2023] predniSONE tablet 5 mg Daily    prochlorperazine injection Soln 5 mg Q6H PRN    QUEtiapine tablet 25 mg QHS    simethicone chewable tablet 80 mg QID PRN    sodium bicarbonate tablet 650 mg BID    sodium chloride 0.9% 250 mL flush bag 1 [facility] in Clinic[facility]    sodium chloride 0.9% flush 10 mL PRN    sodium chloride 0.9% flush 10 mL PRN     sodium chloride 0.9% flush 10 mL PRN    sodium chloride 0.9% flush 10 mL PRN    sodium chloride 0.9% flush 5 mL PRN    sodium zirconium cyclosilicate packet 5 g Daily     Facility-Administered Medications Ordered in Other Encounters   Medication Frequency    celecoxib capsule 400 mg Once    fentaNYL 50 mcg/mL injection  mcg PRN    LIDOcaine (PF) 10 mg/ml (1%) injection 10 mg Once PRN    LIDOcaine (PF) 10 mg/ml (1%) injection 10 mg Once    midazolam (VERSED) 1 mg/mL injection 0.5-4 mg PRN    ropivacaine 0.2% [facility] PainPRO Pump infusion 500 ML Continuous       Objective:     Vital Signs (Most Recent):  Temp: 97.4 °F (36.3 °C) (11/21/22 1602)  Pulse: (!) 57 (11/21/22 1602)  Resp: 18 (11/21/22 1602)  BP: (!) 109/55 (11/21/22 1602)  SpO2: 98 % (11/21/22 1602)  O2 Device (Oxygen Therapy): room air (11/21/22 1130) Vital Signs (24h Range):  Temp:  [97.2 °F (36.2 °C)-98.9 °F (37.2 °C)] 97.4 °F (36.3 °C)  Pulse:  [57-69] 57  Resp:  [14-18] 18  SpO2:  [93 %-99 %] 98 %  BP: (109-134)/(55-63) 109/55     Weight: 57.6 kg (127 lb) (11/14/22 0958)  Body mass index is 24 kg/m².  Body surface area is 1.57 meters squared.    I/O last 3 completed shifts:  In: 240 [P.O.:240]  Out: 900 [Urine:900]    Physical Exam  Vitals and nursing note reviewed.   Constitutional:       General: She is awake.      Appearance: She is well-developed. She is ill-appearing. She is not toxic-appearing or diaphoretic.   HENT:      Head: Normocephalic and atraumatic.      Right Ear: External ear normal.      Left Ear: External ear normal.      Nose:      Comments: + NGT     Mouth/Throat:      Mouth: Mucous membranes are dry.   Eyes:      General: Lids are normal. No scleral icterus.        Right eye: No discharge.         Left eye: No discharge.   Cardiovascular:      Rate and Rhythm: Normal rate and regular rhythm.   Pulmonary:      Effort: Pulmonary effort is normal.      Breath sounds: Normal breath sounds. No wheezing or rhonchi.  "  Abdominal:      General: Bowel sounds are normal.      Palpations: Abdomen is soft.      Tenderness: There is no abdominal tenderness.   Musculoskeletal:         General: No deformity.      Cervical back: Normal range of motion and neck supple. No rigidity or tenderness.      Right lower leg: No edema.      Left lower leg: No edema.   Skin:     General: Skin is warm and dry.   Neurological:      General: No focal deficit present.      Mental Status: She is alert and oriented to person, place, and time. Mental status is at baseline.   Psychiatric:         Attention and Perception: Attention normal.         Behavior: Behavior normal.       Significant Labs:  All labs within the past 24 hours have been reviewed.     Significant Imaging:  Labs: Reviewed    Assessment/Plan:     * Microscopic polyangiitis  See "Primary pauci-immune necrotizing and crescentic glomerulonephritis"      Primary pauci-immune necrotizing and crescentic glomerulonephritis  Kidney biopsy (10/26): pauci immune necrotizing and crescentic glomerulonephritis   s/p IV Solumedrol 1000 mg x3 doses.  PLEX 10/26, 10/27, 10/29, 10/30, 11/1, 11/2, 11/3 (prior to Rituxan)  Rituximab 375 mg/m^2 (600 mg) on 10/27 11/3, 11/10 and 11/17 (completed 4 doses)  Cyclophosphamide 7.5 mg/kg on 10/27 and 11/10 ( every 14 days ); next dose on the 24th  Serum BUN/Cr continue trending up, currently at 112/10.2 on most recent labs   Now following PEXIVAS steroid taper  Continue Atovaquone prophylaxis  Started making some urine; UOP of 900mLs over the past 24h  Holding off HD and evaluating daily; may need additional dialysis for clearance   No emergent indication for hemodialysis at this time, will continue monitoring            James Gomez MD  Nephrology  [facility] - Intensive Care ([facility]  "

## 2022-11-21 NOTE — SUBJECTIVE & OBJECTIVE
Interval History: K at 5.0 this AM. UOP of 900mLs over the past 24h. Serum creatinine continues trending up, at 10.2 this AM.     Review of patient's allergies indicates:   Allergen Reactions    Ampicillin     Peaches [peach (prunus persica)] Other (See Comments)     Pt unable to state type of reaction. Information obtained from daughter who states she was informed of allergy from patient.    Penicillins      Other reaction(s): Hives    Sulfa (sulfonamide antibiotics) Rash and Hives     Current Facility-Administered Medications   Medication Frequency    0.9%  NaCl infusion (for blood administration) Q24H PRN    0.9%  NaCl infusion (for blood administration) Q24H PRN    0.9%  NaCl infusion Once    acetaminophen tablet 650 mg Q4H PRN    albuterol-ipratropium 2.5 mg-0.5 mg/3 mL nebulizer solution 3 mL Q4H PRN    aluminum-magnesium hydroxide-simethicone 200-200-20 mg/5 mL suspension 30 mL QID PRN    aluminum-magnesium hydroxide-simethicone 200-200-20 mg/5 mL suspension 30 mL Once    And    LIDOcaine HCl 2% oral solution 15 mL Once    amLODIPine tablet 10 mg Daily    atovaquone 750 mg/5 mL oral liquid 1,500 mg Daily    bisacodyL suppository 10 mg Daily PRN    carvediloL tablet 25 mg BID    cloNIDine tablet 0.1 mg Q6H PRN    dextrose 10% bolus 125 mL PRN    dextrose 10% bolus 250 mL PRN    glucagon (human recombinant) injection 1 mg PRN    glucose chewable tablet 16 g PRN    glucose chewable tablet 24 g PRN    heparin (porcine) injection 1,000 Units PRN    heparin, porcine (PF) 100 unit/mL injection flush 500 Units PRN    heparin, porcine (PF) 100 unit/mL injection flush 500 Units PRN    hydrALAZINE tablet 100 mg Q8H    insulin aspart U-100 pen 1-10 Units Q6H PRN    levothyroxine tablet 25 mcg Before breakfast    melatonin tablet 6 mg Nightly PRN    methocarbamoL tablet 500 mg TID PRN    naloxone 0.4 mg/mL injection 0.02 mg PRN    nystatin 100,000 unit/mL suspension 500,000 Units QID    ondansetron injection 4 mg Q8H PRN     pantoprazole EC tablet 40 mg BID AC    predniSONE tablet 20 mg Daily    Followed by    [START ON 12/4/2022] predniSONE tablet 12.5 mg Daily    Followed by    [START ON 12/18/2022] predniSONE tablet 10 mg Daily    Followed by    [START ON 1/1/2023] predniSONE tablet 5 mg Daily    prochlorperazine injection Soln 5 mg Q6H PRN    QUEtiapine tablet 25 mg QHS    simethicone chewable tablet 80 mg QID PRN    sodium bicarbonate tablet 650 mg BID    sodium chloride 0.9% 250 mL flush bag 1 time in Clinic/HOD    sodium chloride 0.9% flush 10 mL PRN    sodium chloride 0.9% flush 10 mL PRN    sodium chloride 0.9% flush 10 mL PRN    sodium chloride 0.9% flush 10 mL PRN    sodium chloride 0.9% flush 5 mL PRN    sodium zirconium cyclosilicate packet 5 g Daily     Facility-Administered Medications Ordered in Other Encounters   Medication Frequency    celecoxib capsule 400 mg Once    fentaNYL 50 mcg/mL injection  mcg PRN    LIDOcaine (PF) 10 mg/ml (1%) injection 10 mg Once PRN    LIDOcaine (PF) 10 mg/ml (1%) injection 10 mg Once    midazolam (VERSED) 1 mg/mL injection 0.5-4 mg PRN    ropivacaine 0.2% [facility] PainPRO Pump infusion 500 ML Continuous       Objective:     Vital Signs (Most Recent):  Temp: 97.4 °F (36.3 °C) (11/21/22 1602)  Pulse: (!) 57 (11/21/22 1602)  Resp: 18 (11/21/22 1602)  BP: (!) 109/55 (11/21/22 1602)  SpO2: 98 % (11/21/22 1602)  O2 Device (Oxygen Therapy): room air (11/21/22 1130) Vital Signs (24h Range):  Temp:  [97.2 °F (36.2 °C)-98.9 °F (37.2 °C)] 97.4 °F (36.3 °C)  Pulse:  [57-69] 57  Resp:  [14-18] 18  SpO2:  [93 %-99 %] 98 %  BP: (109-134)/(55-63) 109/55     Weight: 57.6 kg (127 lb) (11/14/22 0958)  Body mass index is 24 kg/m².  Body surface area is 1.57 meters squared.    I/O last 3 completed shifts:  In: 240 [P.O.:240]  Out: 900 [Urine:900]    Physical Exam  Vitals and nursing note reviewed.   Constitutional:       General: She is awake.      Appearance: She is well-developed. She is  ill-appearing. She is not toxic-appearing or diaphoretic.   HENT:      Head: Normocephalic and atraumatic.      Right Ear: External ear normal.      Left Ear: External ear normal.      Nose:      Comments: + NGT     Mouth/Throat:      Mouth: Mucous membranes are dry.   Eyes:      General: Lids are normal. No scleral icterus.        Right eye: No discharge.         Left eye: No discharge.   Cardiovascular:      Rate and Rhythm: Normal rate and regular rhythm.   Pulmonary:      Effort: Pulmonary effort is normal.      Breath sounds: Normal breath sounds. No wheezing or rhonchi.   Abdominal:      General: Bowel sounds are normal.      Palpations: Abdomen is soft.      Tenderness: There is no abdominal tenderness.   Musculoskeletal:         General: No deformity.      Cervical back: Normal range of motion and neck supple. No rigidity or tenderness.      Right lower leg: No edema.      Left lower leg: No edema.   Skin:     General: Skin is warm and dry.   Neurological:      General: No focal deficit present.      Mental Status: She is alert and oriented to person, place, and time. Mental status is at baseline.   Psychiatric:         Attention and Perception: Attention normal.         Behavior: Behavior normal.       Significant Labs:  All labs within the past 24 hours have been reviewed.     Significant Imaging:  Labs: Reviewed

## 2022-11-21 NOTE — PLAN OF CARE
11/21/22 1438   Post-Acute Status   Post-Acute Authorization Placement   Post-Acute Placement Status Pending post-acute provider review/more information requested       [facility] Phone: [phone] - interested, but need more information: will have to review more progress notes when resident is stable because it seems she had NGT replaced friday. And will she continue dialysis when discharged?     [facility] Phone: [phone]  -No, unable to accept patient: Care Needs Exceed Current Capacity     [facility] Phone: [phone]  -No, unable to accept patient :Low level dialysis patient and also no beds     [facility] Phone: [phone] - No, unable to accept patient: Care Needs Exceed Current Capacity     [facility] Phone: [phone]  -referral Received     [facility] Phone: [phone]  - Referral received    [facility] Phone: [phone]  - Referral Received     [facility] Phone: [phone]  - no response     [facility] Phone: [phone]  -no response    SW will continue to follow.       Irene Ribera LMSW  PRN - [facility]  [id number]

## 2022-11-22 LAB
ALBUMIN SERPL BCP-MCNC: 2.3 G/DL (ref 3.5–5.2)
ANION GAP SERPL CALC-SCNC: 15 MMOL/L (ref 8–16)
BASOPHILS # BLD AUTO: 0.02 K/UL (ref 0–0.2)
BASOPHILS NFR BLD: 0.2 % (ref 0–1.9)
BUN SERPL-MCNC: 117 MG/DL (ref 8–23)
CALCIUM SERPL-MCNC: 7.8 MG/DL (ref 8.7–10.5)
CHLORIDE SERPL-SCNC: 102 MMOL/L (ref 95–110)
CO2 SERPL-SCNC: 18 MMOL/L (ref 23–29)
CREAT SERPL-MCNC: 10.3 MG/DL (ref 0.5–1.4)
DIFFERENTIAL METHOD: ABNORMAL
EOSINOPHIL # BLD AUTO: 0 K/UL (ref 0–0.5)
EOSINOPHIL NFR BLD: 0.1 % (ref 0–8)
ERYTHROCYTE [DISTWIDTH] IN BLOOD BY AUTOMATED COUNT: 14.5 % (ref 11.5–14.5)
EST. GFR  (NO RACE VARIABLE): 3.6 ML/MIN/1.73 M^2
GLUCOSE SERPL-MCNC: 71 MG/DL (ref 70–110)
HCT VFR BLD AUTO: 25.8 % (ref 37–48.5)
HGB BLD-MCNC: 8.7 G/DL (ref 12–16)
IMM GRANULOCYTES # BLD AUTO: 0.13 K/UL (ref 0–0.04)
IMM GRANULOCYTES NFR BLD AUTO: 1.4 % (ref 0–0.5)
LYMPHOCYTES # BLD AUTO: 1.1 K/UL (ref 1–4.8)
LYMPHOCYTES NFR BLD: 12.1 % (ref 18–48)
MAGNESIUM SERPL-MCNC: 2.1 MG/DL (ref 1.6–2.6)
MCH RBC QN AUTO: 29.2 PG (ref 27–31)
MCHC RBC AUTO-ENTMCNC: 33.7 G/DL (ref 32–36)
MCV RBC AUTO: 87 FL (ref 82–98)
MONOCYTES # BLD AUTO: 0.7 K/UL (ref 0.3–1)
MONOCYTES NFR BLD: 8.1 % (ref 4–15)
NEUTROPHILS # BLD AUTO: 7.1 K/UL (ref 1.8–7.7)
NEUTROPHILS NFR BLD: 78.1 % (ref 38–73)
NRBC BLD-RTO: 0 /100 WBC
PHOSPHATE SERPL-MCNC: 8.3 MG/DL (ref 2.7–4.5)
PHOSPHATE SERPL-MCNC: 8.5 MG/DL (ref 2.7–4.5)
PLATELET # BLD AUTO: 136 K/UL (ref 150–450)
PLATELET BLD QL SMEAR: ABNORMAL
PMV BLD AUTO: 11.9 FL (ref 9.2–12.9)
POCT GLUCOSE: 109 MG/DL (ref 70–110)
POCT GLUCOSE: 114 MG/DL (ref 70–110)
POCT GLUCOSE: 115 MG/DL (ref 70–110)
POCT GLUCOSE: 121 MG/DL (ref 70–110)
POCT GLUCOSE: 123 MG/DL (ref 70–110)
POTASSIUM SERPL-SCNC: 4.9 MMOL/L (ref 3.5–5.1)
RBC # BLD AUTO: 2.98 M/UL (ref 4–5.4)
SODIUM SERPL-SCNC: 135 MMOL/L (ref 136–145)
WBC # BLD AUTO: 9.15 K/UL (ref 3.9–12.7)

## 2022-11-22 PROCEDURE — 25000003 PHARM REV CODE 250: Performed by: STUDENT IN AN ORGANIZED HEALTH CARE EDUCATION/TRAINING PROGRAM

## 2022-11-22 PROCEDURE — 80069 RENAL FUNCTION PANEL: CPT | Performed by: HOSPITALIST

## 2022-11-22 PROCEDURE — 99232 PR SUBSEQUENT HOSPITAL CARE,LEVL II: ICD-10-PCS | Mod: ,,, | Performed by: HOSPITALIST

## 2022-11-22 PROCEDURE — 36415 COLL VENOUS BLD VENIPUNCTURE: CPT

## 2022-11-22 PROCEDURE — 63600175 PHARM REV CODE 636 W HCPCS: Performed by: INTERNAL MEDICINE

## 2022-11-22 PROCEDURE — 99232 SBSQ HOSP IP/OBS MODERATE 35: CPT | Mod: ,,, | Performed by: HOSPITALIST

## 2022-11-22 PROCEDURE — 83735 ASSAY OF MAGNESIUM: CPT

## 2022-11-22 PROCEDURE — 25000003 PHARM REV CODE 250: Performed by: HOSPITALIST

## 2022-11-22 PROCEDURE — 25000003 PHARM REV CODE 250: Performed by: INTERNAL MEDICINE

## 2022-11-22 PROCEDURE — 97535 SELF CARE MNGMENT TRAINING: CPT

## 2022-11-22 PROCEDURE — 25000003 PHARM REV CODE 250

## 2022-11-22 PROCEDURE — 90935 HEMODIALYSIS ONE EVALUATION: CPT

## 2022-11-22 PROCEDURE — 84100 ASSAY OF PHOSPHORUS: CPT

## 2022-11-22 PROCEDURE — 85025 COMPLETE CBC W/AUTO DIFF WBC: CPT | Performed by: HOSPITALIST

## 2022-11-22 PROCEDURE — 20600001 HC STEP DOWN PRIVATE ROOM

## 2022-11-22 PROCEDURE — 63600175 PHARM REV CODE 636 W HCPCS: Performed by: STUDENT IN AN ORGANIZED HEALTH CARE EDUCATION/TRAINING PROGRAM

## 2022-11-22 RX ORDER — SODIUM CHLORIDE 9 MG/ML
INJECTION, SOLUTION INTRAVENOUS ONCE
Status: COMPLETED | OUTPATIENT
Start: 2022-11-22 | End: 2022-11-22

## 2022-11-22 RX ORDER — HEPARIN SODIUM 1000 [USP'U]/ML
1000 INJECTION, SOLUTION INTRAVENOUS; SUBCUTANEOUS
Status: DISCONTINUED | OUTPATIENT
Start: 2022-11-22 | End: 2022-12-13 | Stop reason: HOSPADM

## 2022-11-22 RX ADMIN — HYDRALAZINE HYDROCHLORIDE 100 MG: 50 TABLET ORAL at 01:11

## 2022-11-22 RX ADMIN — HEPARIN SODIUM 1000 UNITS: 1000 INJECTION, SOLUTION INTRAVENOUS; SUBCUTANEOUS at 12:11

## 2022-11-22 RX ADMIN — HYDRALAZINE HYDROCHLORIDE 100 MG: 50 TABLET ORAL at 09:11

## 2022-11-22 RX ADMIN — NYSTATIN 500000 UNITS: 500000 SUSPENSION ORAL at 01:11

## 2022-11-22 RX ADMIN — PANTOPRAZOLE SODIUM 40 MG: 40 TABLET, DELAYED RELEASE ORAL at 04:11

## 2022-11-22 RX ADMIN — SODIUM CHLORIDE: 0.9 INJECTION, SOLUTION INTRAVENOUS at 09:11

## 2022-11-22 RX ADMIN — LEVOTHYROXINE SODIUM 25 MCG: 25 TABLET ORAL at 05:11

## 2022-11-22 RX ADMIN — NYSTATIN 500000 UNITS: 500000 SUSPENSION ORAL at 04:11

## 2022-11-22 RX ADMIN — QUETIAPINE FUMARATE 25 MG: 25 TABLET ORAL at 08:11

## 2022-11-22 RX ADMIN — SODIUM BICARBONATE 650 MG TABLET 650 MG: at 01:11

## 2022-11-22 RX ADMIN — NYSTATIN 500000 UNITS: 500000 SUSPENSION ORAL at 08:11

## 2022-11-22 RX ADMIN — CARVEDILOL 25 MG: 25 TABLET, FILM COATED ORAL at 08:11

## 2022-11-22 RX ADMIN — PREDNISONE 20 MG: 20 TABLET ORAL at 01:11

## 2022-11-22 RX ADMIN — HYDRALAZINE HYDROCHLORIDE 100 MG: 50 TABLET ORAL at 05:11

## 2022-11-22 RX ADMIN — PANTOPRAZOLE SODIUM 40 MG: 40 TABLET, DELAYED RELEASE ORAL at 05:11

## 2022-11-22 RX ADMIN — SODIUM ZIRCONIUM CYCLOSILICATE 5 G: 5 POWDER, FOR SUSPENSION ORAL at 01:11

## 2022-11-22 RX ADMIN — ATOVAQUONE 1500 MG: 750 SUSPENSION ORAL at 01:11

## 2022-11-22 RX ADMIN — SODIUM BICARBONATE 650 MG TABLET 650 MG: at 08:11

## 2022-11-22 NOTE — PROGRESS NOTES
11/22/22 1124   Vital Signs   Pulse (!) 52   Heart Rate Source Monitor   O2 Device (Oxygen Therapy) room air   BP (!) 93/51   MAP (mmHg) 69   BP Location Right arm   BP Method Automatic   Patient Position Lying   During Hemodialysis Assessment   Intra-Hemodialysis Comments Patient complaining of nausea with blood pressure drop.  Turned UF off and administered 100ml NS.  Relief of symptoms folloing interventions with no vomiting.

## 2022-11-22 NOTE — PT/OT/SLP PROGRESS
Occupational Therapy      Patient Name:  Kristin Goodman   MRN:  [id number]    Patient not seen today secondary to dialysis. Will follow-up as scheduled per OT POC.    11/22/2022

## 2022-11-22 NOTE — PLAN OF CARE
[facility] Phone: [phone]  -called SW. SW provide facility with anticipated discharge date and facility will contact daughter Roro to sign paperwork. ; still willing to accept patient. SW will follow patient.    4:18 PM   PHN called SW and reported that patient Auth will be good until tomorrow and with have to get approve extended inpatient stay. Pt has been approve for SNF. SW informed MD.          Irene Ribera LMSW  PRN - [facility]  [id number]

## 2022-11-22 NOTE — PROGRESS NOTES
[facility] - Intensive Care ([address] Medicine  Progress Note    Patient Name: Kristin Goodman  MRN: [id number]  Patient Class: IP- Inpatient   Admission Date: 10/17/2022  Length of Stay: 36 days  Attending Physician: West Quinn MD  Primary Care Provider: Lori Hernandez MD        Subjective:     Principal Problem:Microscopic polyangiitis        HPI:  Kristin Goodman is a 77 y.o. female with a past medical history of HTN, hypothyroidism, HFpEF, and osteoarthritis of the arm who has presented to the ED for cough, SOB, and weakness. Daughter is present at the bedside. Patient presented to the ED on 9/24 with hemoptysis, CT and CXR showed high suspicion for multilobar pneumonia. Patient was discharged with a 10-day course of levofloxacin and an albuterol inhaler. Patient followed up with her PCP on 10/4 with slight improvement in symptoms and went back to work. Patient continued to have worsening symptoms and presented to the ED again on 10/14 for evaluation and no interventions were done. Patient endorses fevers over the last few days up to 102.4 F with progressive SOB. She endorses hemoptysis with moderate amount of blood, generalized weakness, productive cough, SOB, and loss of appetite. Denies chest pain, nausea, vomiting, abdominal pain, or urinary changes.    ED: hypertensive up to 219/93 and tachycardic up to 117. Oxygen saturation on 92% on RA, placed on 5L NC with sats >97%. CBC remarkable for leukocytosis of 16.03 and Hb 7.7. K 3.3. Cr 1.6, baseline ~1.0. . Troponin 0.061. COVID and flu negative. EKG NSR. CT chest with contrast pending at time of admission. Given home amlodipine and lisinopril. Given 500mL NS, IV azithromycin, cefepime and vanc.       Overview/Hospital Course:  Ms. Goodman was admitted to Hospital Medicine for management of multifocal pneumonia and acute hypoxemic respiratory failure after presenting to the ED with fevers, cough, hemoptysis, and dyspnea. She required  escalation to the Medical ICU due to abrupt decline in her respiratory status, associated with hemoptysis and requiring NIPPV. CT chest showed bilateral patchy ground glass opacities. Labs additionally were notable for acute renal failure on CKD3. Pulmonology was consulted while under the care of [facility] and felt this picture was consistent with diffuse alveolar hemorrhage.     Her workup revealed a strongly positive MPO Ab consistent with clinical picture of microscopic polyangiitis with pulmonary and renal involvement. She underwent Trialysis catheter placement 10/25/2022 in the Medical ICU. 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     Rheumatology was consulted, extensive workup revealed MITUL + 1:2560 homogenous, +dsDNA, normal complements, PR3 1.2 (slight +),  (+), cANCA + 1:80, pANCA neg, GBMAb neg, trace cryos. Due to concern for MPA, she was started on pulse dose IV methylprednisolone 1000mg for 3 days, and plasmapheresis under the guidance of Transfusion Medicine. She remains on a steroid taper and has completed PLEX. She additionally received rituximab and cyclophosphamide. OI prophylaxis was provided with atovaquone due to a sulfa allergy.     Nephrology was consulted for evaluation of acute renal failure in the setting of MPA. She did require SLED in the ICU for renal clearance and remains on intermittent hemodialysis. She is expected to continue HD once discharged.     Her acute hypoxemic respiratory failure improved and she was weaned off of supplemental oxygen. She stepped down to [facility] 10/28.     She underwent MBBS for evaluation of dysphagia, which showed global delayed initiation of swallow and aspiration with thin liquids. Due to  concern for possible prior stroke, head imaging was completed and negative for acute finding. She is NPO with NG tube. ENT was consulted for evaluation of dysphagia and cervical adenopathy.  Patient did not tolerate laryngoscopy; unable to visualize vocal cords but given her lack of hoarseness, they did not suspect vocal fold paresis/paralysis. MRI recommended by rheum to fully rule out any potential neurological cause of the patient's dysphagia; but demonstrated   remote left thalamic lacunar type infarct and punctate remote left cerebellar infarcts.  She is continuing to work with speech therapy.  EGD completed 11/14 without abnormalities.  Per GI, Suspect some aspect of esophageal dysmotility in setting of critical illness. No further testing at this time.  Per SLP, diet advanced on 11/15 and NG tube removed.    Her other chronic medical conditions including hypothyroidism, diastolic CHF, and hypertension were managed with her home medications, with dose adjustments as needed.     11/17 Hb trended dopwn to 6.6. Transfusion with 1 unit of PRBC .  Rheumatology following for steroid dosing and chemotherapeutic agents. on IV steroids due to p.o. intake issues, however can transition to p.o. when swallowing issues reliably resolved - on steroid taper - solumedrol   rituximab dose due November 17; has been . Nephrology following for HD and  monitoring for renal recovery with some  improvement in UOP in last 2 days. will need PermCath and HD chair set up if no renal  recovery.  likely SNF pending nephro decision regarding HD . No need for HD today. Continue 2 grams of sodium chloride tablets TID for likely SIADH. SLP recs Mechanical soft, Liquid Diet Level: Thin   11/18  sodium normalized at 137. salt tablets discontinued. Hb at 8.5 s/p 1 U PRBC. UOP 400mL /24h.  K at 5 . switched to prednisone taper by nephrology .started lokelma 5 mg x 3days  11/19  mL /24h. BUN/CR trending up to 102/9.3 ,no HD for now per  nephro  11/20  mL /24h. BUN/CR trending up to 113/9.8 , started on sodium bicarb for bicarb 19. continue lokelma 5g daily for 5.1 .   11/21 nephrology follow up  -No urgent indication for HD today as with no signs of uremic encephalopathy or  O2 requirements  11/22 HD x1 today for uremia           Review of Systems:   Pain scale:   Constitutional:  fever,  chills, headache, vision loss, hearing loss, weight loss, Generalized weakness, falls, loss of smell, loss of taste, poor appetite,  sore throat  Respiratory: cough, shortness of breath.   Cardiovascular: chest pain, dizziness, palpitations, orthopnea, swelling of feet, syncope  Gastrointestinal: nausea, vomiting, abdominal pain, diarrhea, black stool,  blood in stool, change in bowel habits  Genitourinary: hematuria, dysuria, urgency, frequency  Integument/Breast: rash,  pruritis  Hematologic/Lymphatic: easy bruising, lymphadenopathy  Musculoskeletal: arthralgias , myalgias, back pain, neck pain, knee pain  Neurological: confusion, seizures, tremors, slurred speech  Behavioral/Psych:  depression, anxiety, auditory or visual hallucinations     OBJECTIVE:     Physical Exam:  Body mass index is 23.99 kg/m².    Constitutional: Appears well-developed and well-nourished.   Head: Normocephalic and atraumatic.   Neck: Normal range of motion. Neck supple. left IJ trialysis  Cardiovascular: Normal heart rate.  Regular heart rhythm.  Pulmonary/Chest: Effort normal.   Abdominal: No distension.  No tenderness  Musculoskeletal: Normal range of motion. No edema.   Neurological: Alert and oriented to person, place, and time.   Skin: Skin is warm and dry.   Psychiatric: Normal mood and affect. Behavior is normal.                  Vital Signs  Temp: 97.6 °F (36.4 °C) (11/22/22 1540)  Pulse: 68 (11/22/22 1540)  Resp: 18 (11/22/22 1540)  BP: 134/61 (11/22/22 1540)  SpO2: 95 % (11/22/22 1540)     24 Hour VS Range    Temp:  [96 °F (35.6 °C)-98 °F (36.7 °C)]   Pulse:  [52-69]    Resp:  [15-18]   BP: ()/(51-66)   SpO2:  [90 %-95 %]     Intake/Output Summary (Last 24 hours) at 11/22/2022 1614  Last data filed at 11/22/2022 1331  Gross per 24 hour   Intake 677.2 ml   Output 909 ml   Net -231.8 ml         I/O This Shift:  I/O this shift:  In: 677.2 [I.V.:277.2; Other:400]  Out: 909 [Urine:500; Other:409]    Wt Readings from Last 3 Encounters:   11/22/22 57.6 kg (126 lb 15.8 oz)   10/14/22 68 kg (150 lb)   10/04/22 68 kg (149 lb 14.6 oz)       I have personally reviewed the vitals and recorded Intake/Output     Laboratory/Diagnostic Data:    CBC/Anemia Labs: Coags:    Recent Labs   Lab 11/20/22  0942 11/21/22  0550 11/22/22  0521   WBC 9.27 8.23 9.15   HGB 9.2* 8.2* 8.7*   HCT 29.5* 25.9* 25.8*    210 136*   MCV 93 91 87   RDW 15.1* 14.7* 14.5    No results for input(s): PT, INR, APTT in the last 168 hours.     Chemistries: ABG:   Recent Labs   Lab 11/20/22  0443 11/20/22  1657 11/21/22  0550 11/21/22  1059 11/22/22  0521   * 137  --  135* 135*   K 5.1 5.0  --  4.4 4.9    100  --  100 102   CO2 19* 20*  --  19* 18*   * 112*  --  113* 117*   CREATININE 9.8* 10.2*  --  10.1* 10.3*   CALCIUM 7.9* 7.5*  --  7.8* 7.8*   MG 2.1  --  2.0  --  2.1   PHOS 7.5* 7.7* 8.1* 7.8* 8.3*  8.5*    No results for input(s): PH, PCO2, PO2, HCO3, POCSATURATED, BE in the last 168 hours.     POCT Glucose: HbA1c:    Recent Labs   Lab 11/21/22  1130 11/21/22  1603 11/22/22  0022 11/22/22  0803 11/22/22  1237 11/22/22  1542   POCTGLUCOSE 126* 193* 114* 109 115* 121*    Hemoglobin A1C   Date Value Ref Range Status   08/02/2022 5.5 4.0 - 5.6 % Final     Comment:     ADA Screening Guidelines:  5.7-6.4%  Consistent with prediabetes  >or=6.5%  Consistent with diabetes    High levels of fetal hemoglobin interfere with the HbA1C  assay. Heterozygous hemoglobin variants (HbS, HgC, etc)do  not significantly interfere with this assay.   However, presence of multiple variants may affect accuracy.      11/22/2021 5.4 4.0 - 5.6 % Final     Comment:     ADA Screening Guidelines:  5.7-6.4%  Consistent with prediabetes  >or=6.5%  Consistent with diabetes    High levels of fetal hemoglobin interfere with the HbA1C  assay. Heterozygous hemoglobin variants (HbS, HgC, etc)do  not significantly interfere with this assay.   However, presence of multiple variants may affect accuracy.     01/08/2021 5.3 4.0 - 5.6 % Final     Comment:     ADA Screening Guidelines:  5.7-6.4%  Consistent with prediabetes  >or=6.5%  Consistent with diabetes  High levels of fetal hemoglobin interfere with the HbA1C  assay. Heterozygous hemoglobin variants (HbS, HgC, etc)do  not significantly interfere with this assay.   However, presence of multiple variants may affect accuracy.          Cardiac Enzymes: Ejection Fractions:    No results for input(s): CPK, CPKMB, MB, TROPONINI in the last 72 hours. EF   Date Value Ref Range Status   10/17/2022 65 % Final          No results for input(s): COLORU, APPEARANCEUA, PHUR, SPECGRAV, PROTEINUA, GLUCUA, KETONESU, BILIRUBINUA, OCCULTUA, NITRITE, UROBILINOGEN, LEUKOCYTESUR, RBCUA, WBCUA, BACTERIA, SQUAMEPITHEL, HYALINECASTS in the last 48 hours.    Invalid input(s): WRIGHTSUR    Procalcitonin (ng/mL)   Date Value   10/14/2022 0.12   09/24/2022 0.06     Lactate (Lactic Acid) (mmol/L)   Date Value   10/23/2022 0.9   10/17/2022 0.9   09/24/2022 0.7     BNP (pg/mL)   Date Value   10/23/2022 140 (H)   10/20/2022 335 (H)   10/17/2022 188 (H)   10/14/2022 281 (H)   09/24/2022 109 (H)     CRP (mg/L)   Date Value   04/06/2022 2.8     Sed Rate (mm/Hr)   Date Value   04/06/2022 54 (H)     D-Dimer (mg/L FEU)   Date Value   10/14/2022 1.96 (H)     Ferritin (ng/mL)   Date Value   10/30/2022 374 (H)     No results found for: LDH  Troponin I (ng/mL)   Date Value   10/23/2022 0.008   10/17/2022 0.052 (H)   10/17/2022 0.061 (H)   10/14/2022 <0.006   09/24/2022 0.006   04/18/2022 <0.006   08/13/2019 <0.006   04/28/2019 0.019      CPK (U/L)   Date Value   11/06/2022 92   04/18/2022 362 (H)   01/08/2021 317 (H)   04/28/2019 486 (H)   04/28/2019 484 (H)   04/27/2019 274 (H)   12/08/2016 216 (H)   07/30/2016 354 (H)     No results found for this or any previous visit.  SARS-CoV2 (COVID-19) Qualitative PCR (no units)   Date Value   10/24/2022 Not Detected   02/14/2022 Not Detected   09/25/2020 Not Detected   05/21/2020 Not Detected     SARS-CoV-2 RNA, Amplification, Qual (no units)   Date Value   10/17/2022 Negative     POC Rapid COVID (no units)   Date Value   09/24/2022 Negative       Microbiology labs for the last week  Microbiology Results (last 7 days)       ** No results found for the last 168 hours. **            Reviewed and noted in plan where applicable- Please see chart for full lab data.    Lines/Drains:  Trialysis (Dialysis) Catheter 10/25/22 2329 left internal jugular (Active)   Line Necessity Review CRRT/HD 11/13/22 2100   Verification by X-ray Yes 11/13/22 2100   Site Assessment No drainage;No swelling;No redness;No warmth 11/17/22 0945   Line Securement Device Secured with sutures 11/17/22 0945   Dressing Type Biopatch in place;Transparent (Tegaderm) 11/17/22 0945   Dressing Status Clean;Dry;Intact 11/17/22 0945   Dressing Intervention Integrity maintained 11/17/22 0945   Date on Dressing 11/09/22 11/15/22 1042   Dressing Due to be Changed 11/16/22 11/15/22 1042   Venous Patency/Care flushed w/o difficulty 11/15/22 1042   Arterial Patency/Care flushed w/o difficulty 11/15/22 1042   Distal Patency/Care flushed w/o difficulty 11/15/22 1042   Flows Good 11/11/22 1320   Waveform Not being transduced 11/10/22 1901   Number of days: 22            Midline Catheter Insertion/Assessment  - Single Lumen 10/18/22 1831 Left brachial vein 18g x 10cm (Active)   Site Assessment Clean;Dry;Intact 11/17/22 0945   IV Device Securement catheter securement device 11/17/22 0945   Line Status Saline locked 11/17/22 0945   Dressing Type Biopatch in  place;Transparent (Tegaderm) 11/17/22 0945   Dressing Status Clean;Dry;Intact 11/17/22 0945   Dressing Intervention Integrity maintained 11/17/22 0945   Dressing Change Due 11/22/22 11/15/22 2000   Site Change Due 11/15/22 11/15/22 2000   Reason Not Rotated Poor venous access 11/15/22 2000   Number of days: 29       Imaging  ECG Results              EKG 12-lead (Final result)  Result time 10/17/22 14:26:15      Final result by Interface, Lab In [facility] (10/17/22 14:26:15)               Narrative:    Test Reason : R06.02,    Vent. Rate : 093 BPM     Atrial Rate : 093 BPM     P-R Int : 126 ms          QRS Dur : 070 ms      QT Int : 356 ms       P-R-T Axes : 048 052 030 degrees     QTc Int : 442 ms    Normal sinus rhythm  Normal ECG  When compared with ECG of 14-OCT-2022 14:26,  Limb lead reversal has been corrected  Confirmed by Jc Barbosa MD ([id number]) on 10/17/2022 2:26:04 PM    Referred By: [email or web address]   SELF           Confirmed By:Jc Barbosa MD                                  Results for orders placed during the hospital encounter of 10/17/22    Echo    Interpretation Summary  · The left ventricle is normal in size with normal systolic function. The estimated ejection fraction is 65%.  · Normal right ventricular size with normal right ventricular systolic function.  · Grade I left ventricular diastolic dysfunction.  · Mild tricuspid regurgitation.  · There is pulmonary hypertension. The estimated PA systolic pressure is 56 mmHg.  · Normal to low central venous pressure (3 mmHg).      X-Ray Abdomen AP 1 View  Narrative: EXAMINATION:  XR ABDOMEN AP 1 VIEW    CLINICAL HISTORY:  NGT placement; Dysphagia, unspecified    TECHNIQUE:  AP View(s) of the abdomen was performed.    COMPARISON:  No 11/20/2022 ne    FINDINGS:  Feeding tube in the stomach.  No significant bowel dilatation.  Impression: See above    Electronically signed by: Ej Strickland MD  Date:    11/14/2022  Time:    11:02      Labs, Imaging, EKG and  Diagnostic results from 11/22/2022 were reviewed.    Medications:  Medication list was reviewed and changes noted under Assessment/Plan.  Current Facility-Administered Medications on File Prior to Encounter   Medication Dose Route Frequency Provider Last Rate Last Admin    celecoxib capsule 400 mg  400 mg Oral Once Dieudonne Marshall MD        fentaNYL 50 mcg/mL injection  mcg   mcg Intravenous PRN Dieudonne Marshall MD   100 mcg at 12/21/21 0919    LIDOcaine (PF) 10 mg/ml (1%) injection 10 mg  1 mL Intradermal Once PRN Dieudonne Marshall MD        LIDOcaine (PF) 10 mg/ml (1%) injection 10 mg  1 mL Intradermal Once Dieudonne Marshall MD        midazolam (VERSED) 1 mg/mL injection 0.5-4 mg  0.5-4 mg Intravenous PRN Dieudonne Marshall MD   2 mg at 12/21/21 0919    ropivacaine 0.2% Nimbus PainPRO Pump infusion 500 ML   Perineural Continuous Dieudonne Marshall MD   New Bag at 12/21/21 1153     Current Outpatient Medications on File Prior to Encounter   Medication Sig Dispense Refill    ACETAMINOPHEN (TYLENOL ARTHRITIS PAIN ORAL) Take 1 tablet by mouth once daily.       albuterol (VENTOLIN HFA) 90 mcg/actuation inhaler Inhale 2 puffs into the lungs every 6 (six) hours as needed for Shortness of Breath. Rescue 18 g 0    amLODIPine (NORVASC) 10 MG tablet Take 1 tablet (10 mg total) by mouth once daily. 90 tablet 3    aspirin 325 MG tablet Take 1 tablet at lunch daily starting after surgery for 6 weeks to prevent DVT. 42 tablet 0    diclofenac sodium (VOLTAREN) 1 % Gel Apply 2 g topically 4 (four) times daily. for 10 days 400 g 0    epinastine 0.05 % ophthalmic solution Place 1 drop into both eyes once daily.       EUTHYROX 25 mcg tablet Take 1 tablet by mouth once daily 90 tablet 0    fish,bora,flax oils-om3,6,9no1 (OMEGA 3-6-9) 1,200 mg Cap Take 1 each by mouth once daily. 30 capsule 3    fluticasone propionate (FLONASE) 50 mcg/actuation nasal spray 1 spray (50 mcg total) by Each Nostril route 2  (two) times daily. 16 g 0    FLUZONE HIGHDOSE QUAD 20-21  mcg/0.7 mL Syrg ADM 0.7ML IM UTD      hydrALAZINE (APRESOLINE) 100 MG tablet TAKE 1 TABLET BY MOUTH THREE TIMES DAILY 90 tablet 0    HYDROcodone-acetaminophen (NORCO) 5-325 mg per tablet Take 1 tablet by mouth every 6 (six) hours as needed.      ibuprofen (ADVIL,MOTRIN) 800 MG tablet Take 800 mg by mouth every 8 (eight) hours as needed.      levocetirizine (XYZAL) 5 MG tablet Take 1 tablet (5 mg total) by mouth every evening. For sinus 30 tablet 0    lisinopriL (PRINIVIL,ZESTRIL) 40 MG tablet Take 1 tablet by mouth once daily 90 tablet 0    meloxicam (MOBIC) 15 MG tablet Take 1 tablet (15 mg total) by mouth daily as needed (arthritis). 30 tablet 2    methocarbamoL (ROBAXIN) 500 MG Tab Take 1 tablet (500 mg total) by mouth 3 (three) times daily as needed (muscle spasms). 40 tablet 0    metoprolol succinate (TOPROL-XL) 50 MG 24 hr tablet Take 1 tablet by mouth once daily 90 tablet 0    mupirocin (BACTROBAN) 2 % ointment Apply topically 2 (two) times daily.      tiZANidine (ZANAFLEX) 4 MG tablet Take 1 tablet by mouth twice daily as needed 20 tablet 0     Scheduled Medications:  sodium chloride 0.9%, , Intravenous, Once  aluminum-magnesium hydroxide-simethicone, 30 mL, Oral, Once   And  LIDOcaine HCl 2%, 15 mL, Oral, Once  amLODIPine, 10 mg, Per NG tube, Daily  atovaquone, 1,500 mg, Per NG tube, Daily  carvediloL, 25 mg, Per NG tube, BID  hydrALAZINE, 100 mg, Per NG tube, Q8H  levothyroxine, 25 mcg, Per NG tube, Before breakfast  nystatin, 500,000 Units, Oral, QID  pantoprazole, 40 mg, Oral, BID AC  predniSONE, 20 mg, Oral, Daily   Followed by  [START ON 12/4/2022] predniSONE, 12.5 mg, Oral, Daily   Followed by  [START ON 12/18/2022] predniSONE, 10 mg, Oral, Daily   Followed by  [START ON 1/1/2023] predniSONE, 5 mg, Oral, Daily  QUEtiapine, 25 mg, Oral, QHS  sodium bicarbonate, 650 mg, Oral, BID  sodium chloride 0.9% flush bag IVPB, , Intravenous, 1 time  in Clinic/HOD    PRN: sodium chloride, sodium chloride, acetaminophen, albuterol-ipratropium, aluminum-magnesium hydroxide-simethicone, bisacodyL, cloNIDine, dextrose 10%, dextrose 10%, glucagon (human recombinant), glucose, glucose, heparin (porcine), heparin (porcine), heparin, porcine (PF), heparin, porcine (PF), insulin aspart U-100, melatonin, methocarbamoL, naloxone, ondansetron, prochlorperazine, simethicone, sodium chloride 0.9%, sodium chloride 0.9%, sodium chloride 0.9%, sodium chloride 0.9%, sodium chloride 0.9%, sodium chloride 0.9%  Infusions:   Estimated Creatinine Clearance: 3.9 mL/min (A) (based on SCr of 10.3 mg/dL (H)).    Assessment/Plan:      * Microscopic polyangiitis  As of 10/26/22 strongly positive MPO Ab (in contrast to borderline positive PR3), consistent with clinical picture of microscopic polyangiitis with pulmonary, and renal involvement after presenting with acute hypoxemic respiratory failure, hemoptysis, and acute renal failure.  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   3)  CHANGES SUGGESTIVE OF FOCAL ACUTE PYELONEPHRITIS.   4)  MILD-TO-MODERATE ARTERIONEPHROSCLEROSIS   - Rheumatology consulted, appreciate evaluation and recommendations     - MITUL + 1:2560 homogenous, +dsDNA, normal complements     - PR3 1.2 (slight +),  (+)     - cANCA + 1:80, pANCA neg     - GBMAb neg, trace cryos  - Transfusion Medicine consulted for apheresis  - Nephrology consulted, see separate documentation for [facility]ILFREDO      Plan  - Steroids:    - s/p IV Solumedrol 1000 mg x3 doses.  Now following PEXIVAS steroid taper. Currently on IV Solumedrol 20 mg daily.   - plan for 20mg IV daily on 11/6 for 14 days (last dose of 20mg equivalent is 11/20/2022).   - apheresis: underwent PLEX 10/26, 10/27, 10/30, 11/1, 11/2, 11/3  - rituximab every 7 days for 4 doses: Dose #1: 10/27, #2 11/3, #3 11/10; Next Rituximab 375 mg/m^2 due Nov 17th   - cyclophosphamide every 14 days for 2 doses: 10/27, 11/10  - Opportunistic Infection ppx: atovaquone 1500mg PO daily  - GI bleed prophylaxis: pantoprazole 40mg daily   11/17 Rheumatology following for steroid dosing and chemotherapeutic agents. on IV steroids due to p.o. intake issues, however can transition to p.o. when swallowing issues reliably resolved - on steroid taper - solumedrol .  rituximab dose due November 17 11/18  switched to prednisone taper by nephrology          Gastric reflux  PPI BID  Elevated HOB; feeds changed to bolus and tolerating well  Symptoms initially improved however reoccurrence on 11/13 without significant dietary changes; GI planning for EGD 11/14  TF further adjusted for smaller, more frequent bolus   s/p EGD 11/14; Normal Study      High risk medications (not anticoagulants) long-term use  as above      Anuria    11/17 Nephrology following for HD and  monitoring for renal recovery with some  improvement in UOP in last 2 days. will need PermCath and HD chair set up if no renal  recovery.     11/19  mL /24h.      Gastrointestinal hemorrhage with melena  Starting having hemoptysis and melena with associated acute blood loss anemia earlier in hospital stay. Patient with known internal hemorrhoids, mild sigmoid diverticulosis, history of gastritis and + H pylori (2012 EGD).   - GI consulted 10/19, unable to perform EGD due to instability and respiratory failure at the time    Plan  - patient unable to take PO PPI due to NGT removal on 11/5, transition to IV PPI 40mg pantoprazole daily, return to per NG tube once replaced  - s/p EGD  11/14; Normal Study  - PPI transitioned to BID as concern for GERD  - Monitor CBC closely for signs of recurrent bleed  11/17 Hb trended dopwn to 6.6. Transfusion with 1 unit of PRBC .  111/8  continue protonix oral     Dysphagia  SLP following  MBSS showing global weakness in swallowing as well as aspiration with thin liquids.   ENT consulted but patient did not tolerate laryngoscopy     Plan   - Discussed case with Rheumatology team, they are concerned that this dysphagia may have been from prior stroke, requesting imaging for evaluation-  CT demonstrated no acute findings or causes for dysphagia NOR did MRI   - removed NGT and place dobhoff which is more comfortable and makes swallowing easier compared to NGT.    - continue working with speech therapy   -exam concerning for thrush; nystatin swish and swallow initiated; GI consulted as concern that oropharyngeal candidiasis may be contributing to dysphagia  -Per GI, Lower suspicion for esophageal candidiasis without odynophagia however can If white plaques have been seen on oropharynx, ok to start fluconazole empirically for possible esophageal candidiasis  -EGD on 11/14 without abnormality; per GI, Suspect some aspect of esophageal dysmotility in setting of critical illness. No further testing at this time.  -diet initiated per SLP on 11/16; NG tube removed  -continue to monitor p.o. intake closely  11/17 SLP recs Mechanical soft, Liquid Diet Level: Thin     Moderate malnutrition  Nutrition consulted. Most recent weight and BMI monitored-          Malnutrition (Moderate to Severe)  Weight Loss (Malnutrition): 7.5% in 3 months              Measurements:  Wt Readings from Last 1 Encounters:   11/14/22 57.6 kg (127 lb)   Body mass index is 24 kg/m².    Recommendations: Recommendation/Intervention: 1. As tolerated, increase TF rate (of Novasource) to 35 mL/hr  - 2. RD to monitor & follow-up.  Goals: Meet % EEN, EPN by RD f/u date    Patient has been screened  and assessed by RD. RD will follow patient.      Encounter for continuous renal replacement therapy (CRRT) for acute renal failure  Due to microscopic polyangiitis. Remains on hemodialysis intermittently.   - Baseline creatinine 1.0-1.2.   - New onset proteinuria/hematuria.   - Renal biopsy completed and   - Trialysis in place for intermittent Hemodialysis    Plan  - Nephrology consulted, appreciate management  - management of MPA as noted separately  - Renal function panel daily  - renally dose all medications  - intermittent Hemodialysis as indicated, per Nephrology   11/17     Recent Labs   Lab 11/20/22  1657 11/21/22  1059 11/22/22  0521   * 113* 117*   CREATININE 10.2* 10.1* 10.3*     . Nephrology following for HD and  monitoring for renal recovery with some  improvement in UOP in last 2 days. will need PermCath and HD chair set up if no renal  recovery.  likely SNF pending nephro decision regarding HD   11/20  mL /24h. BUN/CR trending up to 113/9.8 , started on sodium bicarb for bicarb 19. continue lokelma 5g daily for 5.1     Acute hypoxemic respiratory failure  Multifocal pneumonia  Hemoptysis  Dyspnea  Diffuse Alveolar Hemorrahge  In the setting of a new diagnosis of microscopic polyangiitis, presented with fevers, dyspnea,.  - Chest imaging was consistent with diffuse alveolar hemorrhage.  CT chest wc 10/17 with bl perihilar dense consolidations w/ peripheral patcy areas of GGO, BL PNA, less c/f cardiogenic pulmonary edema.  - Patient upgraded to Medical ICU 10/23/22 with abrupt respiratory decline requiring NIPPV, improved with high dose IV steroids, plasmapheresis, emergent hemodialysis.   - Unable to perform bronchoscopy in the Medical ICU due to tenuous respiratory status  - As of 11/6, hypoxemia has significantly improved    Plan:  - weaned to room air  - continue management of underlying triggers with steroid and immunosuppressants  - incentive spirometry and respiratory hygiene  11/17  sats 92% on RA    Lymphadenopathy of head and neck  - Has bilateral neck gland swelling with tenderness,consulted ENT  - No surgical intervention indicated   - Adenopathy likely reactive in nature   - Recommend further workup if it does not resolve within next 2 weeks     Hyponatremia  Has hyponatremia,started on NS. was given 2 grams of sodium chloride tablets TID for likely SIADH  11/18  sodium normalized at 137. salt tablets discontinued.      Acute blood loss anemia  In the setting of GI bleed with melena  - Evaluated by GI, see GI bleed documentation  - Last transfusion 10/28, Hgb slowly trending down since then  - Hgb 6.9 on 11/5      Plan  - Monitor CBC Daily   - Treat with pRBC transfusion PRN Hgb <7  - qualifies for transfusion on 11/5 with Hgb 6.9, 1u pRBC ordered  -stable   11/17 Hb trended dopwn to 6.6. Transfusion with 1 unit of PRBC .consider GI eval    Current CBC reviewed-    Recent Labs   Lab 11/19/22  0610 11/20/22  0942 11/21/22  0550   HGB 7.8* 9.2* 8.2*       Chronic diastolic heart failure  Pulmonary Hypertension due to left heart disease  TTE (10/2022) EF 65%, G1DD  Home meds: Lisinopril, Toprol     No signs or symptoms to suggest acute exacerbation    Plan  - Active volume control with intermittent Hemodialysis per Nephrology   - Daily weights (standing if tolerated)  - Strict I/Os  - Fluid restriction 1.5 day  - Cardiac diet w/ 2 g Na restriction      Hyperkalemia    resolved    Primary hypertension  BP Readings from Last 1 Encounters:   11/18/22 (Abnormal) 161/75     - Patient is unable to tolerate PO mediations, all meds to be administered via NG tube  -Will continue to monitor blood pressure trend closely and adjust antihypertensive regimen as clinically indicated and tolerated.    amLODIPine tablet 10 mg, 10 mg, Per NG tube, Daily    carvediloL tablet 12.5 mg, 12.5 mg, Per NG tube, BID    hydrALAZINE tablet 25 mg, 25 mg, Per NG tube, Q8H    lisinopriL tablet 40 mg, 40 mg, Per NG tube,  Daily      Hypothyroid  - Continue synthroid 25 mcg  - TSH 0.585 10/27/22    VTE Risk Mitigation (From admission, onward)           Ordered     heparin (porcine) injection 1,000 Units  As needed (PRN)         11/22/22 0832     heparin, porcine (PF) 100 unit/mL injection flush 500 Units  As needed (PRN)         11/17/22 1343     heparin, porcine (PF) 100 unit/mL injection flush 500 Units  As needed (PRN)         11/10/22 1430     heparin (porcine) injection 1,000 Units  As needed (PRN)         11/09/22 1601     heparin (porcine) injection 1,000 Units  As needed (PRN)         11/08/22 0854     Place sequential compression device  Until discontinued         10/25/22 1731     IP VTE HIGH RISK PATIENT  Once         10/17/22 1354     Reason for No Pharmacological VTE Prophylaxis  Once        Question:  Reasons:  Answer:  Risk of Bleeding    10/17/22 1354                    Discharge Planning   ANTHONY: 11/25/2022     Code Status: Full Code   Is the patient medically ready for discharge?: No    Reason for patient still in hospital (select all that apply): Treatment  Discharge Plan A: Skilled Nursing Facility   Discharge Delays: None known at this time              West Quinn MD  Department of Hospital Medicine   [facility] - Intensive Care ([place])

## 2022-11-22 NOTE — ASSESSMENT & PLAN NOTE
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  PLEX 10/26, 10/27, 10/29, 10/30, 11/1, 11/2, 11/3 (prior to Rituxan)  Rituximab 375 mg/m^2 (600 mg) on 10/27 11/3, 11/10 and 11/17 (completed 4 doses)  Cyclophosphamide 7.5 mg/kg on 10/27 and 11/10 ( every 14 days ); next dose on the 24th  Serum BUN/Cr continue trending up, currently at 117/10.3 on most recent labs   Will schedule for 1 session oh hemodialysis today   Now following PEXIVAS steroid taper  Continue Atovaquone prophylaxis  Started making some urine; UOP of 400mLs over the past 24h  Strict I/Os and chart   Avoid nephrotoxic agents as much as possible

## 2022-11-22 NOTE — PROGRESS NOTES
Patient arrived in a wheelchair to dialysis unit.   Report received as documented in PER Handoff on Doc Flowsheet.  VS's per Doc Flowsheet.    Acute Hemodialysis initiated using the following:    Dialysis Access: left tiralysis catheter    Had to reverse lines at start of treatment due to arterial pressures hitting -300's. Once reversed treatment running well. Both lumens with good blood return and flush easily.    Will Maintain telemetry and blood pressure monitoring throughout treatment.  Refer to flowsheet and MAR for details.

## 2022-11-22 NOTE — PROGRESS NOTES
[facility] - Intensive Care ([place] [address])  Nephrology  Progress Note    Patient Name: Kristin Goodman  MRN: [id number]  Admission Date: 10/17/2022  Hospital Length of Stay: 36 days  Attending Provider: West Quinn MD   Primary Care Physician: Lori Hernandez MD  Principal Problem:Microscopic polyangiitis    Subjective:     HPI: Kristin Goodman is a 77 year old female with hypertension, hypothyroidism, HFpEF who presents for cough, shortness of breath, and weakness.  Patient initially presented to ER on 09/24 with hemoptysis and imaging concerning for multilobar pneumonia at which time she was discharged on a 10 day course of levofloxacin.  Patient initially had some improvement but began having worsening symptoms on 10/14.  Patient describes multiple fevers and progressive shortness of breath.  She continues to have intermittent hemoptysis.  Patient with worsening leukocytosis and limited clinical improvement despite broad-spectrum antibiotics.  She remains on cefepime.  Patient is a noted to have baseline creatinine of 1 as recent as 8/2022 but progressive worsening of creatinine in early October.  On admission patient with creatinine of 1.6 (10/17) with subsequent progression and creatinine of 3.0 at time of consultation (10/23).  Nephrology consulted for acute kidney injury.      Interval History: K at 4.9 this AM. UOP of 400mLs over the past 24h. Serum creatinine continues trending up, at 10.3 this AM.     Review of patient's allergies indicates:   Allergen Reactions    Ampicillin     Peaches [peach (prunus persica)] Other (See Comments)     Pt unable to state type of reaction. Information obtained from daughter who states she was informed of allergy from patient.    Penicillins      Other reaction(s): Hives    Sulfa (sulfonamide antibiotics) Rash and Hives     Current Facility-Administered Medications   Medication Frequency    0.9%  NaCl infusion (for blood administration) Q24H PRN    0.9%  NaCl  infusion (for blood administration) Q24H PRN    0.9%  NaCl infusion Once    acetaminophen tablet 650 mg Q4H PRN    albuterol-ipratropium 2.5 mg-0.5 mg/3 mL nebulizer solution 3 mL Q4H PRN    aluminum-magnesium hydroxide-simethicone 200-200-20 mg/5 mL suspension 30 mL QID PRN    aluminum-magnesium hydroxide-simethicone 200-200-20 mg/5 mL suspension 30 mL Once    And    LIDOcaine HCl 2% oral solution 15 mL Once    amLODIPine tablet 10 mg Daily    atovaquone 750 mg/5 mL oral liquid 1,500 mg Daily    bisacodyL suppository 10 mg Daily PRN    carvediloL tablet 25 mg BID    cloNIDine tablet 0.1 mg Q6H PRN    dextrose 10% bolus 125 mL PRN    dextrose 10% bolus 250 mL PRN    glucagon (human recombinant) injection 1 mg PRN    glucose chewable tablet 16 g PRN    glucose chewable tablet 24 g PRN    heparin (porcine) injection 1,000 Units PRN    heparin (porcine) injection 1,000 Units PRN    heparin, porcine (PF) 100 unit/mL injection flush 500 Units PRN    heparin, porcine (PF) 100 unit/mL injection flush 500 Units PRN    hydrALAZINE tablet 100 mg Q8H    insulin aspart U-100 pen 1-10 Units Q6H PRN    levothyroxine tablet 25 mcg Before breakfast    melatonin tablet 6 mg Nightly PRN    methocarbamoL tablet 500 mg TID PRN    naloxone 0.4 mg/mL injection 0.02 mg PRN    nystatin 100,000 unit/mL suspension 500,000 Units QID    ondansetron injection 4 mg Q8H PRN    pantoprazole EC tablet 40 mg BID AC    predniSONE tablet 20 mg Daily    Followed by    [START ON 12/4/2022] predniSONE tablet 12.5 mg Daily    Followed by    [START ON 12/18/2022] predniSONE tablet 10 mg Daily    Followed by    [START ON 1/1/2023] predniSONE tablet 5 mg Daily    prochlorperazine injection Soln 5 mg Q6H PRN    QUEtiapine tablet 25 mg QHS    simethicone chewable tablet 80 mg QID PRN    sodium bicarbonate tablet 650 mg BID    sodium chloride 0.9% 250 mL flush bag [facility] in [facility]    sodium chloride 0.9% bolus 250 mL  PRN    sodium chloride 0.9% flush 10 mL PRN    sodium chloride 0.9% flush 10 mL PRN    sodium chloride 0.9% flush 10 mL PRN    sodium chloride 0.9% flush 10 mL PRN    sodium chloride 0.9% flush 5 mL PRN     Facility-Administered Medications Ordered in Other Encounters   Medication Frequency    celecoxib capsule 400 mg Once    fentaNYL 50 mcg/mL injection  mcg PRN    LIDOcaine (PF) 10 mg/ml (1%) injection 10 mg Once PRN    LIDOcaine (PF) 10 mg/ml (1%) injection 10 mg Once    midazolam (VERSED) 1 mg/mL injection 0.5-4 mg PRN    ropivacaine 0.2% [facility] PainPRO Pump infusion 500 ML Continuous       Objective:     Vital Signs (Most Recent):  Temp: 97.6 °F (36.4 °C) (11/22/22 1540)  Pulse: 68 (11/22/22 1540)  Resp: 18 (11/22/22 1540)  BP: 134/61 (11/22/22 1540)  SpO2: 95 % (11/22/22 1540)  O2 Device (Oxygen Therapy): room air (11/22/22 1328) Vital Signs (24h Range):  Temp:  [96 °F (35.6 °C)-98 °F (36.7 °C)] 97.6 °F (36.4 °C)  Pulse:  [52-69] 68  Resp:  [15-18] 18  SpO2:  [90 %-95 %] 95 %  BP: ()/(51-66) 134/61     Weight: 57.6 kg (126 lb 15.8 oz) (11/22/22 1328)  Body mass index is 23.99 kg/m².  Body surface area is 1.57 meters squared.    I/O last 3 completed shifts:  In: 120 [P.O.:120]  Out: 400 [Urine:400]    Physical Exam  Vitals and nursing note reviewed.   Constitutional:       General: She is awake.      Appearance: She is well-developed. She is ill-appearing. She is not toxic-appearing or diaphoretic.   HENT:      Head: Normocephalic and atraumatic.      Right Ear: External ear normal.      Left Ear: External ear normal.      Nose:      Comments: + NGT     Mouth/Throat:      Mouth: Mucous membranes are dry.   Eyes:      General: Lids are normal. No scleral icterus.        Right eye: No discharge.         Left eye: No discharge.   Cardiovascular:      Rate and Rhythm: Normal rate and regular rhythm.   Pulmonary:      Effort: Pulmonary effort is normal.      Breath sounds: Normal breath  "sounds. No wheezing or rhonchi.   Abdominal:      General: Bowel sounds are normal.      Palpations: Abdomen is soft.      Tenderness: There is no abdominal tenderness.   Musculoskeletal:         General: No deformity.      Cervical back: Normal range of motion and neck supple. No rigidity or tenderness.      Right lower leg: No edema.      Left lower leg: No edema.   Skin:     General: Skin is warm and dry.   Neurological:      General: No focal deficit present.      Mental Status: She is alert and oriented to person, place, and time. Mental status is at baseline.   Psychiatric:         Attention and Perception: Attention normal.         Behavior: Behavior normal.       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  PLEX 10/26, 10/27, 10/29, 10/30, 11/1, 11/2, 11/3 (prior to Rituxan)  Rituximab 375 mg/m^2 (600 mg) on 10/27 11/3, 11/10 and 11/17 (completed 4 doses)  Cyclophosphamide 7.5 mg/kg on 10/27 and 11/10 ( every 14 days ); next dose on the 24th  Serum BUN/Cr continue trending up, currently at 117/10.3 on most recent labs   Will schedule for 1 session oh hemodialysis today   Now following PEXIVAS steroid taper  Continue Atovaquone prophylaxis  Started making some urine; UOP of 400mLs over the past 24h  Strict I/Os and chart   Avoid nephrotoxic agents as much as possible               James Gomez, " MD  Nephrology  [facility] - Intensive Care ([place]

## 2022-11-22 NOTE — PLAN OF CARE
Recommendations     Remove Dysphagia soft diet restrictions (per SLP). Continue Renal diet & encourage PO intake as tolerated.   If pt agreeable, add Novasource ONS.  RD to monitor & follow-up.     Goals: Meet % EEN, EPN by RD f/u date  Nutrition Goal Status: progressing towards goal  Communication of RD Recs: reviewed with RN

## 2022-11-22 NOTE — SUBJECTIVE & OBJECTIVE
Interval History: K at 4.9 this AM. UOP of 400mLs over the past 24h. Serum creatinine continues trending up, at 10.3 this AM.     Review of patient's allergies indicates:   Allergen Reactions    Ampicillin     Peaches [peach (prunus persica)] Other (See Comments)     Pt unable to state type of reaction. Information obtained from daughter who states she was informed of allergy from patient.    Penicillins      Other reaction(s): Hives    Sulfa (sulfonamide antibiotics) Rash and Hives     Current Facility-Administered Medications   Medication Frequency    0.9%  NaCl infusion (for blood administration) Q24H PRN    0.9%  NaCl infusion (for blood administration) Q24H PRN    0.9%  NaCl infusion Once    acetaminophen tablet 650 mg Q4H PRN    albuterol-ipratropium 2.5 mg-0.5 mg/3 mL nebulizer solution 3 mL Q4H PRN    aluminum-magnesium hydroxide-simethicone 200-200-20 mg/5 mL suspension 30 mL QID PRN    aluminum-magnesium hydroxide-simethicone 200-200-20 mg/5 mL suspension 30 mL Once    And    LIDOcaine HCl 2% oral solution 15 mL Once    amLODIPine tablet 10 mg Daily    atovaquone 750 mg/5 mL oral liquid 1,500 mg Daily    bisacodyL suppository 10 mg Daily PRN    carvediloL tablet 25 mg BID    cloNIDine tablet 0.1 mg Q6H PRN    dextrose 10% bolus 125 mL PRN    dextrose 10% bolus 250 mL PRN    glucagon (human recombinant) injection 1 mg PRN    glucose chewable tablet 16 g PRN    glucose chewable tablet 24 g PRN    heparin (porcine) injection 1,000 Units PRN    heparin (porcine) injection 1,000 Units PRN    heparin, porcine (PF) 100 unit/mL injection flush 500 Units PRN    heparin, porcine (PF) 100 unit/mL injection flush 500 Units PRN    hydrALAZINE tablet 100 mg Q8H    insulin aspart U-100 pen 1-10 Units Q6H PRN    levothyroxine tablet 25 mcg Before breakfast    melatonin tablet 6 mg Nightly PRN    methocarbamoL tablet 500 mg TID PRN    naloxone 0.4 mg/mL injection 0.02 mg PRN    nystatin 100,000 unit/mL suspension 500,000  Units QID    ondansetron injection 4 mg Q8H PRN    pantoprazole EC tablet 40 mg BID AC    predniSONE tablet 20 mg Daily    Followed by    [START ON 12/4/2022] predniSONE tablet 12.5 mg Daily    Followed by    [START ON 12/18/2022] predniSONE tablet 10 mg Daily    Followed by    [START ON 1/1/2023] predniSONE tablet 5 mg Daily    prochlorperazine injection Soln 5 mg Q6H PRN    QUEtiapine tablet 25 mg QHS    simethicone chewable tablet 80 mg QID PRN    sodium bicarbonate tablet 650 mg BID    sodium chloride 0.9% 250 mL flush bag 1 time in Clinic/HOD    sodium chloride 0.9% bolus 250 mL PRN    sodium chloride 0.9% flush 10 mL PRN    sodium chloride 0.9% flush 10 mL PRN    sodium chloride 0.9% flush 10 mL PRN    sodium chloride 0.9% flush 10 mL PRN    sodium chloride 0.9% flush 5 mL PRN     Facility-Administered Medications Ordered in Other Encounters   Medication Frequency    celecoxib capsule 400 mg Once    fentaNYL 50 mcg/mL injection  mcg PRN    LIDOcaine (PF) 10 mg/ml (1%) injection 10 mg Once PRN    LIDOcaine (PF) 10 mg/ml (1%) injection 10 mg Once    midazolam (VERSED) 1 mg/mL injection 0.5-4 mg PRN    ropivacaine 0.2% [facility] PainPRO Pump infusion 500 ML Continuous       Objective:     Vital Signs (Most Recent):  Temp: 97.6 °F (36.4 °C) (11/22/22 1540)  Pulse: 68 (11/22/22 1540)  Resp: 18 (11/22/22 1540)  BP: 134/61 (11/22/22 1540)  SpO2: 95 % (11/22/22 1540)  O2 Device (Oxygen Therapy): room air (11/22/22 1328) Vital Signs (24h Range):  Temp:  [96 °F (35.6 °C)-98 °F (36.7 °C)] 97.6 °F (36.4 °C)  Pulse:  [52-69] 68  Resp:  [15-18] 18  SpO2:  [90 %-95 %] 95 %  BP: ()/(51-66) 134/61     Weight: 57.6 kg (126 lb 15.8 oz) (11/22/22 1328)  Body mass index is 23.99 kg/m².  Body surface area is 1.57 meters squared.    I/O last 3 completed shifts:  In: 120 [P.O.:120]  Out: 400 [Urine:400]    Physical Exam  Vitals and nursing note reviewed.   Constitutional:       General: She is awake.      Appearance: She is  well-developed. She is ill-appearing. She is not toxic-appearing or diaphoretic.   HENT:      Head: Normocephalic and atraumatic.      Right Ear: External ear normal.      Left Ear: External ear normal.      Nose:      Comments: + NGT     Mouth/Throat:      Mouth: Mucous membranes are dry.   Eyes:      General: Lids are normal. No scleral icterus.        Right eye: No discharge.         Left eye: No discharge.   Cardiovascular:      Rate and Rhythm: Normal rate and regular rhythm.   Pulmonary:      Effort: Pulmonary effort is normal.      Breath sounds: Normal breath sounds. No wheezing or rhonchi.   Abdominal:      General: Bowel sounds are normal.      Palpations: Abdomen is soft.      Tenderness: There is no abdominal tenderness.   Musculoskeletal:         General: No deformity.      Cervical back: Normal range of motion and neck supple. No rigidity or tenderness.      Right lower leg: No edema.      Left lower leg: No edema.   Skin:     General: Skin is warm and dry.   Neurological:      General: No focal deficit present.      Mental Status: She is alert and oriented to person, place, and time. Mental status is at baseline.   Psychiatric:         Attention and Perception: Attention normal.         Behavior: Behavior normal.       Significant Labs:  CBC:   Recent Labs   Lab 11/22/22 0521   WBC 9.15   RBC 2.98*   HGB 8.7*   HCT 25.8*   *   MCV 87   MCH 29.2   MCHC 33.7     CMP:   Recent Labs   Lab 11/22/22 0521   GLU 71   CALCIUM 7.8*   ALBUMIN 2.3*   *   K 4.9   CO2 18*      *   CREATININE 10.3*        Significant Imaging:  Labs: Reviewed

## 2022-11-22 NOTE — PROGRESS NOTES
Hemodialysis treatment completed.    Treatment time received: 2 hours and 45 minutes (patient's machine clotted with 15 minutes remaining)    Net fluid removed: 0    No acute distress. Refer to previous dialysis note regarding soft blood pressure.    Heparin Locked ports per order.    Report given as documented in PER Handoff on Doc Flowsheet  Refer to flowsheet and MAR for details.  Patient transported back in wheelchair from Dialysis to primary unit.

## 2022-11-22 NOTE — PT/OT/SLP PROGRESS
"Speech Language Pathology Treatment    Patient Name:  Kristin Goodman   MRN:  [id number]  Admitting Diagnosis: Microscopic polyangiitis    Recommendations:                 General Recommendations:  Dysphagia therapy  Diet recommendations:  Regular, Liquid Diet Level: Thin   Aspiration Precautions: 1 bite/sip at a time, Avoid talking while eating, Eliminate distractions, Feed only when awake/alert, Frequent oral care, HOB to 90 degrees, Small bites/sips, and Strict aspiration precautions   General Precautions: Standard, fall  Communication strategies:  none    Subjective     Pt awake/alert.   "The renal diet don't taste good."    Pain/Comfort:  Pain Rating 1: 0/10    Respiratory Status: Room air    Objective:     Has the patient been evaluated by SLP for swallowing?   Yes  Keep patient NPO? No   Pt found awake/alert in chair with daughter at bedside. Daughter reported that pt has been eating regular solids brought from home such as Tristanian toast, guzman, muffins and crackers without difficulty. Daughter is aware of renal diet restrictions. No PO trials provided 2/2 transport arriving to take pt to dialysis. SLP provided education on aspiration safety/precautions. Pt and daughter verbalized understanding. Recommend regular diet based on daughter's report of pt consuming solids without over s/sx of airway compromise and pt request to consume regular diet.    Assessment:     Kristin Goodman is a 77 y.o. female with an SLP diagnosis of Dysphagia.      Goals:   Multidisciplinary Problems       SLP Goals          Problem: SLP    Goal Priority Disciplines Outcome   SLP Goal     SLP Ongoing, Progressing   Description: Speech Language Pathology Goals  Goals expected to be met by 11/14/22  1. Pt will participate in ongoing assessment of swallow function to determine safest, least restrictive means of nutrition/hydration  2. Educate Pt and family on aspiration precautions and SLP POC  3. Pt will tolerate trials of nectar-thickened " liquids w/o overt S/S aspiration, MIN A  4. Pt will complete dysphagia exercises to improve timing of initiation of swallow x5 with 90% accuracy, MIN A                         Plan:     Patient to be seen:  4 x/week   Plan of Care expires:  11/29/22  Plan of Care reviewed with:  patient, daughter   SLP Follow-Up:  Yes       Discharge recommendations:  nursing facility, skilled   Barriers to Discharge:  None    Time Tracking:     SLP Treatment Date:   11/22/22  Speech Start Time:  0913  Speech Stop Time:  0923     Speech Total Time (min):  10 min    Billable Minutes: Self Care/Home Management Training 10    11/22/2022

## 2022-11-22 NOTE — PROGRESS NOTES
[facility] ([facility]-)  Adult Nutrition  Consult Note    SUMMARY     Recommendations    Remove Dysphagia soft diet restrictions (per SLP). Continue Renal diet & encourage PO intake as tolerated.   If pt agreeable, add Novasource ONS.  RD to monitor & follow-up.    Goals: Meet % EEN, EPN by RD f/u date  Nutrition Goal Status: progressing towards goal  Communication of RD Recs: reviewed with RN    Assessment and Plan    Moderate malnutrition    Nutrition Problem:  Moderate Protein-Calorie Malnutrition  Malnutrition in the context of Acute Illness/Injury    Related to (etiology):  Inability to consume sufficient energy    Signs and Symptoms (as evidenced by):  Body Fat Depletion: moderate depletion of orbitals and triceps   Muscle Mass Depletion: moderate depletion of temples and clavicle region   Weight Loss: 7.5% x 3 months  Fluid Accumulation: mild    Interventions(treatment strategy):  Collaboration of nutrition care w/ other providers  EN    Nutrition Diagnosis Status:  Continues    Malnutrition Assessment    Weight Loss (Malnutrition): 7.5% in 3 months   Orbital Region (Subcutaneous Fat Loss): moderate depletion  Upper Arm Region (Subcutaneous Fat Loss): moderate depletion   Rastafarian Region (Muscle Loss): moderate depletion  Clavicle Bone Region (Muscle Loss): moderate depletion   Edema (Fluid Accumulation): 2-->mild     Reason for Assessment    Reason For Assessment: RD follow-up  Diagnosis: other (see comments) (Polyangiittis)  Relevant Medical History: HTN, HF  Interdisciplinary Rounds: did not attend    General Information Comments: [facility]AEL this AM for HD. Per RN documentation - pt tolerating diet w/ 25% PO intake, also consuming outside foods. SLP rec's Regular diet w/ thin liquids (11/22). TFs discontinued. UBW: 150#, per chart review. NFPE complete 10/27 - moderate fat & muscle wasting. Pt meets criteria for moderate malnutrition. Please see PES statement for details.  Nutrition  "Discharge Planning: Pending clinical course    Nutrition/Diet History    Spiritual, Cultural Beliefs, Presybeterian Practices, Values that Affect Care: no  Food Allergies: other (see comments) (Peaches)  Factors Affecting Nutritional Intake: decreased appetite    Anthropometrics    Temp: 97.4 °F (36.3 °C)  Height Method: Stated  Height: 5' 1" (154.9 cm)  Height (inches): 61 in  Weight Method: Stated  Weight: 57.6 kg (126 lb 15.8 oz)  Weight (lb): 126.99 lb  Ideal Body Weight (IBW), Female: 105 lb  % Ideal Body Weight, Female (lb): 120.94 %  BMI (Calculated): 24  BMI Grade: 18.5-24.9 - normal  Usual Body Weight (UBW), k.7 kg  % Usual Body Weight: 92.62  % Weight Change From Usual Weight: -7.57 %    Lab/Procedures/Meds    Pertinent Labs Reviewed: reviewed  Pertinent Labs Comments: Na 135, , Creat 10.3, GFR 3.6, P 8.3  Pertinent Medications Reviewed: reviewed  Pertinent Medications Comments: -    Estimated/Assessed Needs    Weight Used For Calorie Calculations: 57.6 kg (126 lb 15.8 oz)    Energy Calorie Requirements (kcal): 0736-9289 kcal/d  Energy Need Method: Kcal/kg (25-30 kcal/kg)    Protein Requirements: 75 g/d (1.3 g/kg)  Weight Used For Protein Calculations: 57.6 kg (126 lb 15.8 oz)    Estimated Fluid Requirement Method: other (see comments) (Per MD or 1 mL/kcal)  RDA Method (mL): 1440    Nutrition Prescription Ordered    Current Diet Order: Dysphagia soft, Renal  Current Nutrition Support Formula Ordered: Other (Comment) (Discontinued)    Evaluation of Received Nutrient/Fluid Intake    I/O: +3.7L since     Comments: LBM:     Tolerance: tolerating    Nutrition Risk    Level of Risk/Frequency of Follow-up:  (1x/week)     Monitor and Evaluation    Food and Nutrient Intake: energy intake, food and beverage intake, enteral nutrition intake  Food and Nutrient Adminstration: diet order, enteral and parenteral nutrition administration  Physical Activity and Function: nutrition-related ADLs and " IADLs  Anthropometric Measurements: weight, weight change  Biochemical Data, Medical Tests and Procedures: inflammatory profile, lipid profile, glucose/endocrine profile, gastrointestinal profile, electrolyte and renal panel  Nutrition-Focused Physical Findings: overall appearance     Nutrition Follow-Up    RD Follow-up?: Yes

## 2022-11-23 LAB
ABO + RH BLD: NORMAL
ALBUMIN SERPL BCP-MCNC: 2.2 G/DL (ref 3.5–5.2)
ANION GAP SERPL CALC-SCNC: 12 MMOL/L (ref 8–16)
BASOPHILS # BLD AUTO: 0.01 K/UL (ref 0–0.2)
BASOPHILS # BLD AUTO: 0.01 K/UL (ref 0–0.2)
BASOPHILS NFR BLD: 0.1 % (ref 0–1.9)
BASOPHILS NFR BLD: 0.1 % (ref 0–1.9)
BLD GP AB SCN CELLS X3 SERPL QL: NORMAL
BUN SERPL-MCNC: 82 MG/DL (ref 8–23)
CALCIUM SERPL-MCNC: 7.6 MG/DL (ref 8.7–10.5)
CHLORIDE SERPL-SCNC: 100 MMOL/L (ref 95–110)
CO2 SERPL-SCNC: 24 MMOL/L (ref 23–29)
CREAT SERPL-MCNC: 8.3 MG/DL (ref 0.5–1.4)
DIFFERENTIAL METHOD: ABNORMAL
DIFFERENTIAL METHOD: ABNORMAL
EOSINOPHIL # BLD AUTO: 0 K/UL (ref 0–0.5)
EOSINOPHIL # BLD AUTO: 0.3 K/UL (ref 0–0.5)
EOSINOPHIL NFR BLD: 0 % (ref 0–8)
EOSINOPHIL NFR BLD: 3 % (ref 0–8)
ERYTHROCYTE [DISTWIDTH] IN BLOOD BY AUTOMATED COUNT: 14.7 % (ref 11.5–14.5)
ERYTHROCYTE [DISTWIDTH] IN BLOOD BY AUTOMATED COUNT: 14.7 % (ref 11.5–14.5)
EST. GFR  (NO RACE VARIABLE): 4.6 ML/MIN/1.73 M^2
GLUCOSE SERPL-MCNC: 75 MG/DL (ref 70–110)
HCT VFR BLD AUTO: 21.6 % (ref 37–48.5)
HCT VFR BLD AUTO: 22.7 % (ref 37–48.5)
HGB BLD-MCNC: 7.1 G/DL (ref 12–16)
HGB BLD-MCNC: 7.6 G/DL (ref 12–16)
IMM GRANULOCYTES # BLD AUTO: 0.1 K/UL (ref 0–0.04)
IMM GRANULOCYTES # BLD AUTO: 0.28 K/UL (ref 0–0.04)
IMM GRANULOCYTES NFR BLD AUTO: 1.2 % (ref 0–0.5)
IMM GRANULOCYTES NFR BLD AUTO: 2.5 % (ref 0–0.5)
LYMPHOCYTES # BLD AUTO: 1 K/UL (ref 1–4.8)
LYMPHOCYTES # BLD AUTO: 1.4 K/UL (ref 1–4.8)
LYMPHOCYTES NFR BLD: 16.9 % (ref 18–48)
LYMPHOCYTES NFR BLD: 8.8 % (ref 18–48)
MAGNESIUM SERPL-MCNC: 1.9 MG/DL (ref 1.6–2.6)
MCH RBC QN AUTO: 29.7 PG (ref 27–31)
MCH RBC QN AUTO: 29.9 PG (ref 27–31)
MCHC RBC AUTO-ENTMCNC: 32.9 G/DL (ref 32–36)
MCHC RBC AUTO-ENTMCNC: 33.5 G/DL (ref 32–36)
MCV RBC AUTO: 89 FL (ref 82–98)
MCV RBC AUTO: 90 FL (ref 82–98)
MONOCYTES # BLD AUTO: 0.3 K/UL (ref 0.3–1)
MONOCYTES # BLD AUTO: 0.5 K/UL (ref 0.3–1)
MONOCYTES NFR BLD: 2.5 % (ref 4–15)
MONOCYTES NFR BLD: 6.5 % (ref 4–15)
NEUTROPHILS # BLD AUTO: 6.1 K/UL (ref 1.8–7.7)
NEUTROPHILS # BLD AUTO: 9.3 K/UL (ref 1.8–7.7)
NEUTROPHILS NFR BLD: 75.3 % (ref 38–73)
NEUTROPHILS NFR BLD: 83.1 % (ref 38–73)
NRBC BLD-RTO: 0 /100 WBC
NRBC BLD-RTO: 0 /100 WBC
PHOSPHATE SERPL-MCNC: 6.5 MG/DL (ref 2.7–4.5)
PHOSPHATE SERPL-MCNC: 6.6 MG/DL (ref 2.7–4.5)
PLATELET # BLD AUTO: 100 K/UL (ref 150–450)
PLATELET # BLD AUTO: 103 K/UL (ref 150–450)
PMV BLD AUTO: 11.1 FL (ref 9.2–12.9)
PMV BLD AUTO: 11.7 FL (ref 9.2–12.9)
POCT GLUCOSE: 103 MG/DL (ref 70–110)
POCT GLUCOSE: 115 MG/DL (ref 70–110)
POCT GLUCOSE: 86 MG/DL (ref 70–110)
POTASSIUM SERPL-SCNC: 4.2 MMOL/L (ref 3.5–5.1)
RBC # BLD AUTO: 2.39 M/UL (ref 4–5.4)
RBC # BLD AUTO: 2.54 M/UL (ref 4–5.4)
SODIUM SERPL-SCNC: 136 MMOL/L (ref 136–145)
WBC # BLD AUTO: 11.04 K/UL (ref 3.9–12.7)
WBC # BLD AUTO: 8.16 K/UL (ref 3.9–12.7)

## 2022-11-23 PROCEDURE — 80069 RENAL FUNCTION PANEL: CPT | Performed by: HOSPITALIST

## 2022-11-23 PROCEDURE — 25000003 PHARM REV CODE 250: Performed by: STUDENT IN AN ORGANIZED HEALTH CARE EDUCATION/TRAINING PROGRAM

## 2022-11-23 PROCEDURE — 84100 ASSAY OF PHOSPHORUS: CPT

## 2022-11-23 PROCEDURE — 25000003 PHARM REV CODE 250

## 2022-11-23 PROCEDURE — 86901 BLOOD TYPING SEROLOGIC RH(D): CPT | Performed by: HOSPITALIST

## 2022-11-23 PROCEDURE — 20600001 HC STEP DOWN PRIVATE ROOM

## 2022-11-23 PROCEDURE — 36415 COLL VENOUS BLD VENIPUNCTURE: CPT

## 2022-11-23 PROCEDURE — 83735 ASSAY OF MAGNESIUM: CPT

## 2022-11-23 PROCEDURE — 99232 SBSQ HOSP IP/OBS MODERATE 35: CPT | Mod: ,,, | Performed by: INTERNAL MEDICINE

## 2022-11-23 PROCEDURE — 85025 COMPLETE CBC W/AUTO DIFF WBC: CPT | Mod: 91 | Performed by: HOSPITALIST

## 2022-11-23 PROCEDURE — 99232 PR SUBSEQUENT HOSPITAL CARE,LEVL II: ICD-10-PCS | Mod: ,,, | Performed by: HOSPITALIST

## 2022-11-23 PROCEDURE — 36415 COLL VENOUS BLD VENIPUNCTURE: CPT | Performed by: HOSPITALIST

## 2022-11-23 PROCEDURE — 99232 PR SUBSEQUENT HOSPITAL CARE,LEVL II: ICD-10-PCS | Mod: ,,, | Performed by: INTERNAL MEDICINE

## 2022-11-23 PROCEDURE — 92526 ORAL FUNCTION THERAPY: CPT

## 2022-11-23 PROCEDURE — 99232 SBSQ HOSP IP/OBS MODERATE 35: CPT | Mod: ,,, | Performed by: HOSPITALIST

## 2022-11-23 PROCEDURE — 25000003 PHARM REV CODE 250: Performed by: HOSPITALIST

## 2022-11-23 PROCEDURE — 86920 COMPATIBILITY TEST SPIN: CPT | Performed by: STUDENT IN AN ORGANIZED HEALTH CARE EDUCATION/TRAINING PROGRAM

## 2022-11-23 PROCEDURE — 63600175 PHARM REV CODE 636 W HCPCS: Performed by: STUDENT IN AN ORGANIZED HEALTH CARE EDUCATION/TRAINING PROGRAM

## 2022-11-23 PROCEDURE — 83516 IMMUNOASSAY NONANTIBODY: CPT | Performed by: STUDENT IN AN ORGANIZED HEALTH CARE EDUCATION/TRAINING PROGRAM

## 2022-11-23 PROCEDURE — 97116 GAIT TRAINING THERAPY: CPT | Mod: CQ

## 2022-11-23 PROCEDURE — 97110 THERAPEUTIC EXERCISES: CPT | Mod: CQ

## 2022-11-23 RX ORDER — HYDROCODONE BITARTRATE AND ACETAMINOPHEN 500; 5 MG/1; MG/1
TABLET ORAL
Status: CANCELLED | OUTPATIENT
Start: 2022-11-23

## 2022-11-23 RX ADMIN — AMLODIPINE BESYLATE 10 MG: 10 TABLET ORAL at 09:11

## 2022-11-23 RX ADMIN — NYSTATIN 500000 UNITS: 500000 SUSPENSION ORAL at 08:11

## 2022-11-23 RX ADMIN — QUETIAPINE FUMARATE 25 MG: 25 TABLET ORAL at 08:11

## 2022-11-23 RX ADMIN — HYDRALAZINE HYDROCHLORIDE 100 MG: 50 TABLET ORAL at 01:11

## 2022-11-23 RX ADMIN — PANTOPRAZOLE SODIUM 40 MG: 40 TABLET, DELAYED RELEASE ORAL at 05:11

## 2022-11-23 RX ADMIN — CARVEDILOL 25 MG: 25 TABLET, FILM COATED ORAL at 09:11

## 2022-11-23 RX ADMIN — NYSTATIN 500000 UNITS: 500000 SUSPENSION ORAL at 09:11

## 2022-11-23 RX ADMIN — HYDRALAZINE HYDROCHLORIDE 100 MG: 50 TABLET ORAL at 05:11

## 2022-11-23 RX ADMIN — SODIUM BICARBONATE 650 MG TABLET 650 MG: at 08:11

## 2022-11-23 RX ADMIN — LEVOTHYROXINE SODIUM 25 MCG: 25 TABLET ORAL at 05:11

## 2022-11-23 RX ADMIN — HYDRALAZINE HYDROCHLORIDE 100 MG: 50 TABLET ORAL at 10:11

## 2022-11-23 RX ADMIN — SODIUM BICARBONATE 650 MG TABLET 650 MG: at 09:11

## 2022-11-23 RX ADMIN — PANTOPRAZOLE SODIUM 40 MG: 40 TABLET, DELAYED RELEASE ORAL at 09:11

## 2022-11-23 RX ADMIN — PREDNISONE 20 MG: 20 TABLET ORAL at 09:11

## 2022-11-23 RX ADMIN — ATOVAQUONE 1500 MG: 750 SUSPENSION ORAL at 09:11

## 2022-11-23 RX ADMIN — NYSTATIN 500000 UNITS: 500000 SUSPENSION ORAL at 05:11

## 2022-11-23 RX ADMIN — CARVEDILOL 25 MG: 25 TABLET, FILM COATED ORAL at 08:11

## 2022-11-23 RX ADMIN — NYSTATIN 500000 UNITS: 500000 SUSPENSION ORAL at 01:11

## 2022-11-23 NOTE — PLAN OF CARE
Problem: Adult Inpatient Plan of Care  Goal: Plan of Care Review  Outcome: Ongoing, Progressing  Goal: Absence of Hospital-Acquired Illness or Injury  Outcome: Ongoing, Progressing  Goal: Optimal Comfort and Wellbeing  Outcome: Ongoing, Progressing     Problem: Infection  Goal: Absence of Infection Signs and Symptoms  Outcome: Ongoing, Progressing     Problem: Glycemic Control Impaired (Sepsis/Septic Shock)  Goal: Blood Glucose Level Within Desired Range  Outcome: Ongoing, Progressing     Problem: Skin Injury Risk Increased  Goal: Skin Health and Integrity  Outcome: Ongoing, Progressing     Problem: Impaired Wound Healing  Goal: Optimal Wound Healing  Outcome: Ongoing, Progressing

## 2022-11-23 NOTE — ASSESSMENT & PLAN NOTE
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  PLEX 10/26, 10/27, 10/29, 10/30, 11/1, 11/2, 11/3 (prior to Rituxan)  Rituximab 375 mg/m^2 (600 mg) on 10/27 11/3, 11/10 and 11/17 (completed 4 doses)  Cyclophosphamide 7.5 mg/kg on 10/27 and 11/10 ( every 14 days ); has completed 2 doses   Serum BUN/Cr slowly trending up thus pt was dialyzed yesterday; will continue monitoring daily for dialysis needs   UOP of 500mLs over the past 24h  Now following PEXIVAS steroid taper; currently on Prednisone 30mg PO daily   Continue Atovaquone for prophylaxis  Strict I/Os and chart   Avoid nephrotoxic agents as much as possible

## 2022-11-23 NOTE — PT/OT/SLP PROGRESS
Speech Language Pathology Treatment  Discharge    Patient Name:  Kristin Goodman   MRN:  [id number]  Admitting Diagnosis: Microscopic polyangiitis    Recommendations:                 General Recommendations:  Follow-up not indicated  Diet recommendations:  Regular, Liquid Diet Level: Thin  (pt may downgrade to soft per request)  Aspiration Precautions: Standard aspiration precautions   General Precautions: Standard, fall  Communication strategies:  none    Subjective     Awake/alert    Pain/Comfort:  Pain Rating 1: 0/10  Pain Rating Post-Intervention 1: 0/10    Respiratory Status: Room air    Objective:     Has the patient been evaluated by SLP for swallowing?   Yes  Keep patient NPO? No     Pt upright in bedside chair upon entry. She denied any difficulty with PO intake, but did endorse poor appetite 2/2 dialysis and gas pains. She tolerated solids x2 with timely swallow initiation and adequate oral clearance. No overt clinical s/s of airway compromise noted across trials. Recommend continue regular diet/thin liquids at this time. Pt may request downgrade to soft diet if she wishes. No further ST warranted at this time.     Assessment:     Kristin Goodman is a 77 y.o. female with an SLP diagnosis of Dysphagia.      Goals:   Multidisciplinary Problems       SLP Goals          Problem: SLP    Goal Priority Disciplines Outcome   SLP Goal     SLP Ongoing, Progressing   Description: Speech Language Pathology Goals  Goals expected to be met by 11/14/22  1. Pt will participate in ongoing assessment of swallow function to determine safest, least restrictive means of nutrition/hydration  2. Educate Pt and family on aspiration precautions and SLP POC  3. Pt will tolerate trials of nectar-thickened liquids w/o overt S/S aspiration, MIN A  4. Pt will complete dysphagia exercises to improve timing of initiation of swallow x5 with 90% accuracy, MIN A                         Plan:     Patient to be seen:  4 x/week   Plan of Care  expires:  11/29/22  Plan of Care reviewed with:  patient   SLP Follow-Up:  Yes       Discharge recommendations:  nursing facility, skilled   Time Tracking:     SLP Treatment Date:   11/23/22  Speech Start Time:  0836  Speech Stop Time:  0844     Speech Total Time (min):  8 min    Billable Minutes: Treatment Swallowing Dysfunction 8    11/23/2022

## 2022-11-23 NOTE — PROGRESS NOTES
[facility] - [facility] ([place] [address])  Nephrology  Progress Note    Patient Name: Kristin Goodman  MRN: [id number]  Admission Date: 10/17/2022  Hospital Length of Stay: 37 days  Attending Provider: West Quinn MD   Primary Care Physician: Lori Hernandez MD  Principal Problem:Microscopic polyangiitis    Subjective:     HPI: Kristin Goodman is a 77 year old female with hypertension, hypothyroidism, HFpEF who presents for cough, shortness of breath, and weakness.  Patient initially presented to ER on 09/24 with hemoptysis and imaging concerning for multilobar pneumonia at which time she was discharged on a 10 day course of levofloxacin.  Patient initially had some improvement but began having worsening symptoms on 10/14.  Patient describes multiple fevers and progressive shortness of breath.  She continues to have intermittent hemoptysis.  Patient with worsening leukocytosis and limited clinical improvement despite broad-spectrum antibiotics.  She remains on cefepime.  Patient is a noted to have baseline creatinine of 1 as recent as 8/2022 but progressive worsening of creatinine in early October.  On admission patient with creatinine of 1.6 (10/17) with subsequent progression and creatinine of 3.0 at time of consultation (10/23).  Nephrology consulted for acute kidney injury.      Interval History: UOP of 500mLs over the past 24h. Completed hemodialysis for metabolic clearance yesterday.     Review of patient's allergies indicates:   Allergen Reactions    Ampicillin     Peaches [peach (prunus persica)] Other (See Comments)     Pt unable to state type of reaction. Information obtained from daughter who states she was informed of allergy from patient.    Penicillins      Other reaction(s): Hives    Sulfa (sulfonamide antibiotics) Rash and Hives     Current Facility-Administered Medications   Medication Frequency    0.9%  NaCl infusion (for blood administration) Q24H PRN    0.9%  NaCl infusion (for blood  administration) Q24H PRN    0.9%  NaCl infusion Once    acetaminophen tablet 650 mg Q4H PRN    albuterol-ipratropium 2.5 mg-0.5 mg/3 mL nebulizer solution 3 mL Q4H PRN    aluminum-magnesium hydroxide-simethicone 200-200-20 mg/5 mL suspension 30 mL QID PRN    aluminum-magnesium hydroxide-simethicone 200-200-20 mg/5 mL suspension 30 mL Once    And    LIDOcaine HCl 2% oral solution 15 mL Once    amLODIPine tablet 10 mg Daily    atovaquone 750 mg/5 mL oral liquid 1,500 mg Daily    bisacodyL suppository 10 mg Daily PRN    carvediloL tablet 25 mg BID    cloNIDine tablet 0.1 mg Q6H PRN    dextrose 10% bolus 125 mL PRN    dextrose 10% bolus 250 mL PRN    glucagon (human recombinant) injection 1 mg PRN    glucose chewable tablet 16 g PRN    glucose chewable tablet 24 g PRN    heparin (porcine) injection 1,000 Units PRN    heparin (porcine) injection 1,000 Units PRN    heparin, porcine (PF) 100 unit/mL injection flush 500 Units PRN    heparin, porcine (PF) 100 unit/mL injection flush 500 Units PRN    hydrALAZINE tablet 100 mg Q8H    insulin aspart U-100 pen 1-10 Units Q6H PRN    levothyroxine tablet 25 mcg Before breakfast    melatonin tablet 6 mg Nightly PRN    methocarbamoL tablet 500 mg TID PRN    naloxone 0.4 mg/mL injection 0.02 mg PRN    nystatin 100,000 unit/mL suspension 500,000 Units QID    ondansetron injection 4 mg Q8H PRN    pantoprazole EC tablet 40 mg BID AC    predniSONE tablet 20 mg Daily    Followed by    [START ON 12/4/2022] predniSONE tablet 12.5 mg Daily    Followed by    [START ON 12/18/2022] predniSONE tablet 10 mg Daily    Followed by    [START ON 1/1/2023] predniSONE tablet 5 mg Daily    prochlorperazine injection Soln 5 mg Q6H PRN    QUEtiapine tablet 25 mg QHS    simethicone chewable tablet 80 mg QID PRN    sodium bicarbonate tablet 650 mg BID    sodium chloride 0.9% 250 mL flush bag 1 time in [facility] [place]    sodium chloride 0.9% bolus 250 mL PRN    sodium chloride 0.9% flush 10 mL PRN    sodium  chloride 0.9% flush 10 mL PRN    sodium chloride 0.9% flush 10 mL PRN    sodium chloride 0.9% flush 10 mL PRN    sodium chloride 0.9% flush 5 mL PRN     Facility-Administered Medications Ordered in Other Encounters   Medication Frequency    celecoxib capsule 400 mg Once    fentaNYL 50 mcg/mL injection  mcg PRN    LIDOcaine (PF) 10 mg/ml (1%) injection 10 mg Once PRN    LIDOcaine (PF) 10 mg/ml (1%) injection 10 mg Once    midazolam (VERSED) 1 mg/mL injection 0.5-4 mg PRN    ropivacaine 0.2% [facility] PainPRO Pump infusion 500 ML Continuous       Objective:     Vital Signs (Most Recent):  Temp: 97.7 °F (36.5 °C) (11/23/22 1219)  Pulse: 63 (11/23/22 1219)  Resp: 17 (11/23/22 1219)  BP: 131/60 (11/23/22 1219)  SpO2: 100 % (11/23/22 1219)  O2 Device (Oxygen Therapy): room air (11/23/22 0921) Vital Signs (24h Range):  Temp:  [97.6 °F (36.4 °C)-99.4 °F (37.4 °C)] 97.7 °F (36.5 °C)  Pulse:  [63-75] 63  Resp:  [10-18] 17  SpO2:  [92 %-100 %] 100 %  BP: (120-143)/(56-67) 131/60     Weight: 57.6 kg (126 lb 15.8 oz) (11/22/22 1328)  Body mass index is 23.99 kg/m².  Body surface area is 1.57 meters squared.    I/O last 3 completed shifts:  In: 688.9 [I.V.:288.9; Other:400]  Out: 909 [Urine:500; Other:409]    Physical Exam  Vitals and nursing note reviewed.   Constitutional:       General: She is awake.      Appearance: She is well-developed. She is ill-appearing. She is not toxic-appearing or diaphoretic.   HENT:      Head: Normocephalic and atraumatic.      Right Ear: External ear normal.      Left Ear: External ear normal.      Nose:      Comments: + NGT     Mouth/Throat:      Mouth: Mucous membranes are dry.   Eyes:      General: Lids are normal. No scleral icterus.        Right eye: No discharge.         Left eye: No discharge.   Cardiovascular:      Rate and Rhythm: Normal rate and regular rhythm.   Pulmonary:      Effort: Pulmonary effort is normal.      Breath sounds: Normal breath sounds. No wheezing or rhonchi.  "  Abdominal:      General: Bowel sounds are normal.      Palpations: Abdomen is soft.      Tenderness: There is no abdominal tenderness.   Musculoskeletal:         General: No deformity.      Cervical back: Normal range of motion and neck supple. No rigidity or tenderness.      Right lower leg: No edema.      Left lower leg: No edema.   Skin:     General: Skin is warm and dry.   Neurological:      General: No focal deficit present.      Mental Status: She is alert and oriented to person, place, and time. Mental status is at baseline.   Psychiatric:         Attention and Perception: Attention normal.         Behavior: Behavior normal.       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  PLEX 10/26, 10/27, 10/29, 10/30, 11/1, 11/2, 11/3 (prior to Rituxan)  Rituximab 375 mg/m^2 (600 mg) on 10/27 11/3, 11/10 and 11/17 (completed 4 doses)  Cyclophosphamide 7.5 mg/kg on 10/27 and 11/10 ( every 14 days ); has completed 2 doses   Serum BUN/Cr slowly trending up thus pt was dialyzed yesterday; will continue monitoring daily for dialysis needs   UOP of 500mLs over the past 24h  Now following PEXIVAS steroid taper; currently on Prednisone 30mg PO daily   Continue Atovaquone for prophylaxis  Strict I/Os and chart   Avoid nephrotoxic agents as much as possible             James Gomez MD  Nephrology  [facility] - " Intensive Care ([facility]-)    ATTENDING PHYSICIAN ATTESTATION  I have personally verified the history and examined the patient. I thoroughly reviewed the demographic, clinical, laboratorial and imaging information available in medical records. I agree with the assessment and recommendations provided by the subspecialty resident who was under my supervision.

## 2022-11-23 NOTE — PLAN OF CARE
WYATT advised by CM that per previous notes, Pt accepted to [facility] ([phone]). Reported to admissions that Pt is not ready for dc until next week per MD.     Contacted [facility] ([phone], option 4) to report the above and push the auth date back to Monday. Per Romana with [facility], there is not auth for SNF requested in the system. The auth that was good for today is for [facility] in stay. She will note that Pt is not ready for SNF but they will need us to submit for auth when Pt is ready to their fax: [phone].    WYATT received call from Heydi with [facility] regarding SNF to clarify above. She said they are aware of the SNF plan, it needs to be submitted via fax above but MD director has approved it already so auth should be a quick turnaround. It will not need to be approved by MD director, only Heydi. They are closed tomorrow and Friday for the holiday.     Melinda Cast LCSW  Neurocritical Care   [facility]  [id number]

## 2022-11-23 NOTE — PLAN OF CARE
[facility] ([facility]  Discharge Reassessment    Primary Care Provider: Lori Hernandez MD    Expected Discharge Date: 2022      Per SW notes, [facility] has accepted patient and has authorization., which will  today.  If patient is not ready today, an extension can be filed.  Patient S/p HD yesterday.  Dysphagia soft diet.    Not medically ready per MD.       Reassessment (most recent)       Discharge Reassessment - 22 0916          Discharge Reassessment    Assessment Type Discharge Planning Reassessment     Did the patient's condition or plan change since previous assessment? No     Communicated ANTHONY with patient/caregiver Yes     Discharge Plan A Skilled Nursing Facility     Discharge Plan B Skilled Nursing Facility     DME Needed Upon Discharge  none     Discharge Barriers Identified None     Why the patient remains in the hospital Requires continued medical care                   Rina Larry RN, CCRN-K, [facility]  Neuro-Critical Care   [phone]

## 2022-11-23 NOTE — SUBJECTIVE & OBJECTIVE
Interval History: UOP of 500mLs over the past 24h. Completed hemodialysis for metabolic clearance yesterday.     Review of patient's allergies indicates:   Allergen Reactions    Ampicillin     Peaches [peach (prunus persica)] Other (See Comments)     Pt unable to state type of reaction. Information obtained from daughter who states she was informed of allergy from patient.    Penicillins      Other reaction(s): Hives    Sulfa (sulfonamide antibiotics) Rash and Hives     Current Facility-Administered Medications   Medication Frequency    0.9%  NaCl infusion (for blood administration) Q24H PRN    0.9%  NaCl infusion (for blood administration) Q24H PRN    0.9%  NaCl infusion Once    acetaminophen tablet 650 mg Q4H PRN    albuterol-ipratropium 2.5 mg-0.5 mg/3 mL nebulizer solution 3 mL Q4H PRN    aluminum-magnesium hydroxide-simethicone 200-200-20 mg/5 mL suspension 30 mL QID PRN    aluminum-magnesium hydroxide-simethicone 200-200-20 mg/5 mL suspension 30 mL Once    And    LIDOcaine HCl 2% oral solution 15 mL Once    amLODIPine tablet 10 mg Daily    atovaquone 750 mg/5 mL oral liquid 1,500 mg Daily    bisacodyL suppository 10 mg Daily PRN    carvediloL tablet 25 mg BID    cloNIDine tablet 0.1 mg Q6H PRN    dextrose 10% bolus 125 mL PRN    dextrose 10% bolus 250 mL PRN    glucagon (human recombinant) injection 1 mg PRN    glucose chewable tablet 16 g PRN    glucose chewable tablet 24 g PRN    heparin (porcine) injection 1,000 Units PRN    heparin (porcine) injection 1,000 Units PRN    heparin, porcine (PF) 100 unit/mL injection flush 500 Units PRN    heparin, porcine (PF) 100 unit/mL injection flush 500 Units PRN    hydrALAZINE tablet 100 mg Q8H    insulin aspart U-100 pen 1-10 Units Q6H PRN    levothyroxine tablet 25 mcg Before breakfast    melatonin tablet 6 mg Nightly PRN    methocarbamoL tablet 500 mg TID PRN    naloxone 0.4 mg/mL injection 0.02 mg PRN    nystatin 100,000 unit/mL suspension 500,000 Units QID     ondansetron injection 4 mg Q8H PRN    pantoprazole EC tablet 40 mg BID AC    predniSONE tablet 20 mg Daily    Followed by    [START ON 12/4/2022] predniSONE tablet 12.5 mg Daily    Followed by    [START ON 12/18/2022] predniSONE tablet 10 mg Daily    Followed by    [START ON 1/1/2023] predniSONE tablet 5 mg Daily    prochlorperazine injection Soln 5 mg Q6H PRN    QUEtiapine tablet 25 mg QHS    simethicone chewable tablet 80 mg QID PRN    sodium bicarbonate tablet 650 mg BID    sodium chloride 0.9% 250 mL flush bag 1 time in Clinic/HOD    sodium chloride 0.9% bolus 250 mL PRN    sodium chloride 0.9% flush 10 mL PRN    sodium chloride 0.9% flush 10 mL PRN    sodium chloride 0.9% flush 10 mL PRN    sodium chloride 0.9% flush 10 mL PRN    sodium chloride 0.9% flush 5 mL PRN     Facility-Administered Medications Ordered in Other Encounters   Medication Frequency    celecoxib capsule 400 mg Once    fentaNYL 50 mcg/mL injection  mcg PRN    LIDOcaine (PF) 10 mg/ml (1%) injection 10 mg Once PRN    LIDOcaine (PF) 10 mg/ml (1%) injection 10 mg Once    midazolam (VERSED) 1 mg/mL injection 0.5-4 mg PRN    ropivacaine 0.2% [facility] Pain[facility] Pump infusion 500 ML Continuous       Objective:     Vital Signs (Most Recent):  Temp: 97.7 °F (36.5 °C) (11/23/22 1219)  Pulse: 63 (11/23/22 1219)  Resp: 17 (11/23/22 1219)  BP: 131/60 (11/23/22 1219)  SpO2: 100 % (11/23/22 1219)  O2 Device (Oxygen Therapy): room air (11/23/22 0921) Vital Signs (24h Range):  Temp:  [97.6 °F (36.4 °C)-99.4 °F (37.4 °C)] 97.7 °F (36.5 °C)  Pulse:  [63-75] 63  Resp:  [10-18] 17  SpO2:  [92 %-100 %] 100 %  BP: (120-143)/(56-67) 131/60     Weight: 57.6 kg (126 lb 15.8 oz) (11/22/22 1328)  Body mass index is 23.99 kg/m².  Body surface area is 1.57 meters squared.    I/O last 3 completed shifts:  In: 688.9 [I.V.:288.9; Other:400]  Out: 909 [Urine:500; Other:409]    Physical Exam  Vitals and nursing note reviewed.   Constitutional:       General: She is awake.       Appearance: She is well-developed. She is ill-appearing. She is not toxic-appearing or diaphoretic.   HENT:      Head: Normocephalic and atraumatic.      Right Ear: External ear normal.      Left Ear: External ear normal.      Nose:      Comments: + NGT     Mouth/Throat:      Mouth: Mucous membranes are dry.   Eyes:      General: Lids are normal. No scleral icterus.        Right eye: No discharge.         Left eye: No discharge.   Cardiovascular:      Rate and Rhythm: Normal rate and regular rhythm.   Pulmonary:      Effort: Pulmonary effort is normal.      Breath sounds: Normal breath sounds. No wheezing or rhonchi.   Abdominal:      General: Bowel sounds are normal.      Palpations: Abdomen is soft.      Tenderness: There is no abdominal tenderness.   Musculoskeletal:         General: No deformity.      Cervical back: Normal range of motion and neck supple. No rigidity or tenderness.      Right lower leg: No edema.      Left lower leg: No edema.   Skin:     General: Skin is warm and dry.   Neurological:      General: No focal deficit present.      Mental Status: She is alert and oriented to person, place, and time. Mental status is at baseline.   Psychiatric:         Attention and Perception: Attention normal.         Behavior: Behavior normal.       Significant Labs:  CBC:   Recent Labs   Lab 11/23/22  0507   WBC 8.16   RBC 2.39*   HGB 7.1*   HCT 21.6*   *   MCV 90   MCH 29.7   MCHC 32.9       CMP:   Recent Labs   Lab 11/23/22  0507   GLU 75   CALCIUM 7.6*   ALBUMIN 2.2*      K 4.2   CO2 24      BUN 82*   CREATININE 8.3*          Significant Imaging:  Labs: Reviewed

## 2022-11-23 NOTE — PT/OT/SLP PROGRESS
"Occupational Therapy      Patient Name:  Kristin Goodman   MRN:  [id number]    Patient not seen today secondary to pt declined to participate upon OT attempt at 1425.  She had already performed ADLs and worked with PT earlier and said, "I just got back to bed."  Informed pt OT won't be able to follow up until Friday, 11/25 due to the Thanksgiving holiday.  She verbalized understanding.  Will follow-up as scheduled per OT POC.    11/23/2022  "

## 2022-11-23 NOTE — PT/OT/SLP PROGRESS
"Physical Therapy Treatment    Patient Name:  Kristin Goodman   MRN:  [id number]    Recommendations:     Discharge Recommendations:  nursing facility, skilled   Discharge Equipment Recommendations: bedside commode   Barriers to discharge:  decrease functional mobility     Assessment:     Kristin Goodman is a 77 y.o. female admitted with a medical diagnosis of Microscopic polyangiitis.  She presents with the following impairments/functional limitations:  impaired endurance, gait instability, impaired functional mobility, weakness, decreased coordination, decreased safety awareness, decreased lower extremity function.  Session limited by fatigued, but agreeable to therapy. Pt complete short gait distance and BLE therex. Occasional rest breaks required. Pt continues to report dizziness with positional changes, but reports decreases with rest. Pt is progressing and continues to benefit from therapy to improve functional endurance, strength, and mobility    Rehab Prognosis: Good; patient would benefit from acute skilled PT services to address these deficits and reach maximum level of function.    Recent Surgery: Procedure(s) (LRB):  EGD (ESOPHAGOGASTRODUODENOSCOPY) (N/A) 9 Days Post-Op    Plan:     During this hospitalization, patient to be seen 3 x/week to address the identified rehab impairments via therapeutic activities, therapeutic exercises, neuromuscular re-education, gait training and progress toward the following goals:    Plan of Care Expires:  11/30/22    Subjective     Chief Complaint: fatigued  Patient/Family Comments/goals: "I've been sitting in the chair since early this morning"  Pain/Comfort:  Pain Rating 1: 0/10  Pain Rating Post-Intervention 1: 0/10      Objective:     Communicated with RN prior to session.  Patient found up in chair with no active linesupon PT entry to room.     General Precautions: Standard, fall   Orthopedic Precautions:N/A   Braces: N/A  Respiratory Status: Room air     Functional " Mobility:  Bed Mobility:   Scooting to HOB: via drawsheet: minimum assistance  Pt assisted with BUE and BLE, cues for positioning and BUE on bed rails   Sit to Supine: moderate assistance  Assisted with BLE  Transfers:   Sit <> Stand Transfer: moderate assistance with rolling walker   2 attempts from chair, 1 successful   Verbal cues for hand placement for safety   Pt noted dizziness upon initially standing up, but decreases with rest  Chair to Bed Transfer: minimum assistance with rolling walker using Step Transfer technique   Gait:  Pt ambulated ~8ft with minimum assistance and rolling walker.  Gait Deviation(s): occasional unsteady gait, decreased step length, flexed posture, and decreased obey  Verbal/tactile cues for Rw management, upright posture, and gaze direction   No LOB noted.     AM-PAC 6 CLICK MOBILITY  Turning over in bed (including adjusting bedclothes, sheets and blankets)?: 3  Sitting down on and standing up from a chair with arms (e.g., wheelchair, bedside commode, etc.): 3  Moving from lying on back to sitting on the side of the bed?: 3  Moving to and from a bed to a chair (including a wheelchair)?: 3  Need to walk in hospital room?: 3  Climbing 3-5 steps with a railing?: 1  Basic Mobility Total Score: 16     Treatment & Education:  Seated BLE therex x15 reps: heel/toe raises, LAQ, marching   Assisted with marching on RLE  Patient educated on role of therapy, goals of session, and benefits of out of bed mobility.   Instructed on use of call button and importance of calling nursing staff for assistance with mobility   Questions/concerns addressed within PTA scope of practice  Pt verbalized understanding.  Whiteboard Updated    Patient left HOB elevated with all lines intact, call button in reach, bed alarm on, and RN notified.    GOALS:   Multidisciplinary Problems       Physical Therapy Goals          Problem: Physical Therapy    Goal Priority Disciplines Outcome Goal Variances Interventions    Physical Therapy Goal     PT, PT/OT Ongoing, Progressing     Description: Goals to be met by: 2022     Patient will increase functional independence with mobility by performin. Supine to sit with contact guard assistance  2. Sit to supine with contact guard assistance  3. Sit to stand transfer with minimum assistance MET   3a. STS supvn LRAD  4. Gait  x 40 feet with minimum assistance using LRAD as needed  5. Lower extremity exercise program x10 reps per handout, with independence                        Time Tracking:     PT Received On: 22  PT Start Time: 1357     PT Stop Time: 1420  PT Total Time (min): 23 min     Billable Minutes: Gait Training 10 and Therapeutic Exercise 13    Treatment Type: Treatment  PT/PTA: PTA     PTA Visit Number: 4     2022

## 2022-11-23 NOTE — PROGRESS NOTES
[facility] - [facility] ([address] Medicine  Progress Note    Patient Name: Kristin Goodman  MRN: [id number]  Patient Class: IP- Inpatient   Admission Date: 10/17/2022  Length of Stay: 37 days  Attending Physician: West Quinn MD  Primary Care Provider: Lori Hernandez MD        Subjective:     Principal Problem:Microscopic polyangiitis        HPI:  Kristin Goodman is a 77 y.o. female with a past medical history of HTN, hypothyroidism, HFpEF, and osteoarthritis of the arm who has presented to the ED for cough, SOB, and weakness. Daughter is present at the bedside. Patient presented to the ED on 9/24 with hemoptysis, CT and CXR showed high suspicion for multilobar pneumonia. Patient was discharged with a 10-day course of levofloxacin and an albuterol inhaler. Patient followed up with her PCP on 10/4 with slight improvement in symptoms and went back to work. Patient continued to have worsening symptoms and presented to the ED again on 10/14 for evaluation and no interventions were done. Patient endorses fevers over the last few days up to 102.4 F with progressive SOB. She endorses hemoptysis with moderate amount of blood, generalized weakness, productive cough, SOB, and loss of appetite. Denies chest pain, nausea, vomiting, abdominal pain, or urinary changes.    ED: hypertensive up to 219/93 and tachycardic up to 117. Oxygen saturation on 92% on RA, placed on 5L NC with sats >97%. CBC remarkable for leukocytosis of 16.03 and Hb 7.7. K 3.3. Cr 1.6, baseline ~1.0. . Troponin 0.061. COVID and flu negative. EKG NSR. CT chest with contrast pending at time of admission. Given home amlodipine and lisinopril. Given 500mL NS, IV azithromycin, cefepime and vanc.       Overview/Hospital Course:  Ms. Goodman was admitted to Hospital Medicine for management of multifocal pneumonia and acute hypoxemic respiratory failure after presenting to the ED with fevers, cough, hemoptysis, and dyspnea. She required  escalation to the Medical ICU due to abrupt decline in her respiratory status, associated with hemoptysis and requiring NIPPV. CT chest showed bilateral patchy ground glass opacities. Labs additionally were notable for acute renal failure on CKD3. Pulmonology was consulted while under the care of [facility] and felt this picture was consistent with diffuse alveolar hemorrhage.     Her workup revealed a strongly positive MPO Ab consistent with clinical picture of microscopic polyangiitis with pulmonary and renal involvement. She underwent Trialysis catheter placement 10/25/2022 in the Medical ICU. 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     Rheumatology was consulted, extensive workup revealed MITUL + 1:2560 homogenous, +dsDNA, normal complements, PR3 1.2 (slight +),  (+), cANCA + 1:80, pANCA neg, GBMAb neg, trace cryos. Due to concern for MPA, she was started on pulse dose IV methylprednisolone 1000mg for 3 days, and plasmapheresis under the guidance of Transfusion Medicine. She remains on a steroid taper and has completed PLEX. She additionally received rituximab and cyclophosphamide. OI prophylaxis was provided with atovaquone due to a sulfa allergy.     Nephrology was consulted for evaluation of acute renal failure in the setting of MPA. She did require SLED in the ICU for renal clearance and remains on intermittent hemodialysis. She is expected to continue HD once discharged.     Her acute hypoxemic respiratory failure improved and she was weaned off of supplemental oxygen. She stepped down to [facility] 10/28.     She underwent MBBS for evaluation of dysphagia, which showed global delayed initiation of swallow and aspiration with thin liquids. Due to  concern for possible prior stroke, head imaging was completed and negative for acute finding. She is NPO with NG tube. ENT was consulted for evaluation of dysphagia and cervical adenopathy.  Patient did not tolerate laryngoscopy; unable to visualize vocal cords but given her lack of hoarseness, they did not suspect vocal fold paresis/paralysis. MRI recommended by rheum to fully rule out any potential neurological cause of the patient's dysphagia; but demonstrated   remote left thalamic lacunar type infarct and punctate remote left cerebellar infarcts.  She is continuing to work with speech therapy.  EGD completed 11/14 without abnormalities.  Per GI, Suspect some aspect of esophageal dysmotility in setting of critical illness. No further testing at this time.  Per SLP, diet advanced on 11/15 and NG tube removed.    Her other chronic medical conditions including hypothyroidism, diastolic CHF, and hypertension were managed with her home medications, with dose adjustments as needed.     11/17 Hb trended dopwn to 6.6. Transfusion with 1 unit of PRBC .  Rheumatology following for steroid dosing and chemotherapeutic agents. on IV steroids due to p.o. intake issues, however can transition to p.o. when swallowing issues reliably resolved - on steroid taper - solumedrol   rituximab dose due November 17; has been . Nephrology following for HD and  monitoring for renal recovery with some  improvement in UOP in last 2 days. will need PermCath and HD chair set up if no renal  recovery.  likely SNF pending nephro decision regarding HD . No need for HD today. Continue 2 grams of sodium chloride tablets TID for likely SIADH. SLP recs Mechanical soft, Liquid Diet Level: Thin   11/18  sodium normalized at 137. salt tablets discontinued. Hb at 8.5 s/p 1 U PRBC. UOP 400mL /24h.  K at 5 . switched to prednisone taper by nephrology .started lokelma 5 mg x 3days  11/19  mL /24h. BUN/CR trending up to 102/9.3 ,no HD for now per  nephro  11/20  mL /24h. BUN/CR trending up to 113/9.8 , started on sodium bicarb for bicarb 19. continue lokelma 5g daily for 5.1 .   11/21 nephrology follow up  -No urgent indication for HD today as with no signs of uremic encephalopathy or  O2 requirements  11/22 HD x1 today for uremia   11/23 Hb 7.1 . HD yesterday without ultrafiltration . UOp 500ml/24h           Review of Systems:   Pain scale:   Constitutional:  fever,  chills, headache, vision loss, hearing loss, weight loss, Generalized weakness, falls, loss of smell, loss of taste, poor appetite,  sore throat  Respiratory: cough, shortness of breath.   Cardiovascular: chest pain, dizziness, palpitations, orthopnea, swelling of feet, syncope  Gastrointestinal: nausea, vomiting, abdominal pain, diarrhea, black stool,  blood in stool, change in bowel habits  Genitourinary: hematuria, dysuria, urgency, frequency  Integument/Breast: rash,  pruritis  Hematologic/Lymphatic: easy bruising, lymphadenopathy  Musculoskeletal: arthralgias , myalgias, back pain, neck pain, knee pain  Neurological: confusion, seizures, tremors, slurred speech  Behavioral/Psych:  depression, anxiety, auditory or visual hallucinations     OBJECTIVE:     Physical Exam:  Body mass index is 23.99 kg/m².    Constitutional: Appears well-developed and well-nourished.   Head: Normocephalic and atraumatic.   Neck: Normal range of motion. Neck supple. left IJ trialysis  Cardiovascular: Normal heart rate.  Regular heart rhythm.  Pulmonary/Chest: Effort normal.   Abdominal: No distension.  No tenderness  Musculoskeletal: Normal range of motion. No edema.   Neurological: Alert and oriented to person, place, and time.   Skin: Skin is warm and dry.   Psychiatric: Normal mood and affect. Behavior is normal.                  Vital Signs  Temp: 97.7 °F (36.5 °C) (11/23/22 1219)  Pulse: 63 (11/23/22 1219)  Resp: 17 (11/23/22 1219)  BP: 131/60 (11/23/22 1219)  SpO2: 100 % (11/23/22 1219)     24 Hour VS  Range    Temp:  [97.6 °F (36.4 °C)-99.4 °F (37.4 °C)]   Pulse:  [63-75]   Resp:  [10-18]   BP: (120-143)/(56-67)   SpO2:  [92 %-100 %]     Intake/Output Summary (Last 24 hours) at 11/23/2022 1525  Last data filed at 11/23/2022 0800  Gross per 24 hour   Intake 11.68 ml   Output 500 ml   Net -488.32 ml         I/O This Shift:  I/O this shift:  In: -   Out: 500 [Urine:500]    Wt Readings from Last 3 Encounters:   11/22/22 57.6 kg (126 lb 15.8 oz)   10/14/22 68 kg (150 lb)   10/04/22 68 kg (149 lb 14.6 oz)       I have personally reviewed the vitals and recorded Intake/Output     Laboratory/Diagnostic Data:    CBC/Anemia Labs: Coags:    Recent Labs   Lab 11/21/22  0550 11/22/22  0521 11/23/22  0507   WBC 8.23 9.15 8.16   HGB 8.2* 8.7* 7.1*   HCT 25.9* 25.8* 21.6*    136* 100*   MCV 91 87 90   RDW 14.7* 14.5 14.7*    No results for input(s): PT, INR, APTT in the last 168 hours.     Chemistries: ABG:   Recent Labs   Lab 11/21/22  0550 11/21/22  1059 11/22/22  0521 11/23/22  0507   NA  --  135* 135* 136   K  --  4.4 4.9 4.2   CL  --  100 102 100   CO2  --  19* 18* 24   BUN  --  113* 117* 82*   CREATININE  --  10.1* 10.3* 8.3*   CALCIUM  --  7.8* 7.8* 7.6*   MG 2.0  --  2.1 1.9   PHOS 8.1* 7.8* 8.3*  8.5* 6.6*  6.5*    No results for input(s): PH, PCO2, PO2, HCO3, POCSATURATED, BE in the last 168 hours.     POCT Glucose: HbA1c:    Recent Labs   Lab 11/22/22  0803 11/22/22  1237 11/22/22  1542 11/22/22  2230 11/23/22  0341 11/23/22  1218   POCTGLUCOSE 109 115* 121* 123* 86 115*    Hemoglobin A1C   Date Value Ref Range Status   08/02/2022 5.5 4.0 - 5.6 % Final     Comment:     ADA Screening Guidelines:  5.7-6.4%  Consistent with prediabetes  >or=6.5%  Consistent with diabetes    High levels of fetal hemoglobin interfere with the HbA1C  assay. Heterozygous hemoglobin variants (HbS, HgC, etc)do  not significantly interfere with this assay.   However, presence of multiple variants may affect accuracy.     11/22/2021 5.4  4.0 - 5.6 % Final     Comment:     ADA Screening Guidelines:  5.7-6.4%  Consistent with prediabetes  >or=6.5%  Consistent with diabetes    High levels of fetal hemoglobin interfere with the HbA1C  assay. Heterozygous hemoglobin variants (HbS, HgC, etc)do  not significantly interfere with this assay.   However, presence of multiple variants may affect accuracy.     01/08/2021 5.3 4.0 - 5.6 % Final     Comment:     ADA Screening Guidelines:  5.7-6.4%  Consistent with prediabetes  >or=6.5%  Consistent with diabetes  High levels of fetal hemoglobin interfere with the HbA1C  assay. Heterozygous hemoglobin variants (HbS, HgC, etc)do  not significantly interfere with this assay.   However, presence of multiple variants may affect accuracy.          Cardiac Enzymes: Ejection Fractions:    No results for input(s): CPK, CPKMB, MB, TROPONINI in the last 72 hours. EF   Date Value Ref Range Status   10/17/2022 65 % Final          No results for input(s): COLORU, APPEARANCEUA, PHUR, SPECGRAV, PROTEINUA, GLUCUA, KETONESU, BILIRUBINUA, OCCULTUA, NITRITE, UROBILINOGEN, LEUKOCYTESUR, RBCUA, WBCUA, BACTERIA, SQUAMEPITHEL, HYALINECASTS in the last 48 hours.    Invalid input(s): WRIGHTSUR    Procalcitonin (ng/mL)   Date Value   10/14/2022 0.12   09/24/2022 0.06     Lactate (Lactic Acid) (mmol/L)   Date Value   10/23/2022 0.9   10/17/2022 0.9   09/24/2022 0.7     BNP (pg/mL)   Date Value   10/23/2022 140 (H)   10/20/2022 335 (H)   10/17/2022 188 (H)   10/14/2022 281 (H)   09/24/2022 109 (H)     CRP (mg/L)   Date Value   04/06/2022 2.8     Sed Rate (mm/Hr)   Date Value   04/06/2022 54 (H)     D-Dimer (mg/L FEU)   Date Value   10/14/2022 1.96 (H)     Ferritin (ng/mL)   Date Value   10/30/2022 374 (H)     No results found for: LDH  Troponin I (ng/mL)   Date Value   10/23/2022 0.008   10/17/2022 0.052 (H)   10/17/2022 0.061 (H)   10/14/2022 <0.006   09/24/2022 0.006   04/18/2022 <0.006   08/13/2019 <0.006   04/28/2019 0.019     CPK (U/L)    Date Value   11/06/2022 92   04/18/2022 362 (H)   01/08/2021 317 (H)   04/28/2019 486 (H)   04/28/2019 484 (H)   04/27/2019 274 (H)   12/08/2016 216 (H)   07/30/2016 354 (H)     No results found for this or any previous visit.  SARS-CoV2 (COVID-19) Qualitative PCR (no units)   Date Value   10/24/2022 Not Detected   02/14/2022 Not Detected   09/25/2020 Not Detected   05/21/2020 Not Detected     SARS-CoV-2 RNA, Amplification, Qual (no units)   Date Value   10/17/2022 Negative     POC Rapid COVID (no units)   Date Value   09/24/2022 Negative       Microbiology labs for the last week  Microbiology Results (last 7 days)       ** No results found for the last 168 hours. **            Reviewed and noted in plan where applicable- Please see chart for full lab data.    Lines/Drains:  Trialysis (Dialysis) Catheter 10/25/22 2329 left internal jugular (Active)   Line Necessity Review CRRT/HD 11/13/22 2100   Verification by X-ray Yes 11/13/22 2100   Site Assessment No drainage;No swelling;No redness;No warmth 11/17/22 0945   Line Securement Device Secured with sutures 11/17/22 0945   Dressing Type Biopatch in place;Transparent (Tegaderm) 11/17/22 0945   Dressing Status Clean;Dry;Intact 11/17/22 0945   Dressing Intervention Integrity maintained 11/17/22 0945   Date on Dressing 11/09/22 11/15/22 1042   Dressing Due to be Changed 11/16/22 11/15/22 1042   Venous Patency/Care flushed w/o difficulty 11/15/22 1042   Arterial Patency/Care flushed w/o difficulty 11/15/22 1042   Distal Patency/Care flushed w/o difficulty 11/15/22 1042   Flows Good 11/11/22 1320   Waveform Not being transduced 11/10/22 1901   Number of days: 22            Midline Catheter Insertion/Assessment  - Single Lumen 10/18/22 1831 Left brachial vein 18g x 10cm (Active)   Site Assessment Clean;Dry;Intact 11/17/22 0945   IV Device Securement catheter securement device 11/17/22 0945   Line Status Saline locked 11/17/22 0945   Dressing Type Biopatch in  place;Transparent (Tegaderm) 11/17/22 0945   Dressing Status Clean;Dry;Intact 11/17/22 0945   Dressing Intervention Integrity maintained 11/17/22 0945   Dressing Change Due 11/22/22 11/15/22 2000   Site Change Due 11/15/22 11/15/22 2000   Reason Not Rotated Poor venous access 11/15/22 2000   Number of days: 29       Imaging  ECG Results              EKG 12-lead (Final result)  Result time 10/17/22 14:26:15      Final result by Interface, Lab In [facility] (10/17/22 14:26:15)               Narrative:    Test Reason : R06.02,    Vent. Rate : 093 BPM     Atrial Rate : 093 BPM     P-R Int : 126 ms          QRS Dur : 070 ms      QT Int : 356 ms       P-R-T Axes : 048 052 030 degrees     QTc Int : 442 ms    Normal sinus rhythm  Normal ECG  When compared with ECG of 14-OCT-2022 14:26,  Limb lead reversal has been corrected  Confirmed by Jc Barbosa MD ([id number]) on 10/17/2022 2:26:04 PM    Referred By: [email or web address]   SELF           Confirmed By:Jc Barbosa MD                                  Results for orders placed during the hospital encounter of 10/17/22    Echo    Interpretation Summary  · The left ventricle is normal in size with normal systolic function. The estimated ejection fraction is 65%.  · Normal right ventricular size with normal right ventricular systolic function.  · Grade I left ventricular diastolic dysfunction.  · Mild tricuspid regurgitation.  · There is pulmonary hypertension. The estimated PA systolic pressure is 56 mmHg.  · Normal to low central venous pressure (3 mmHg).      X-Ray Abdomen AP 1 View  Narrative: EXAMINATION:  XR ABDOMEN AP 1 VIEW    CLINICAL HISTORY:  NGT placement; Dysphagia, unspecified    TECHNIQUE:  AP View(s) of the abdomen was performed.    COMPARISON:  No 11/20/2022 ne    FINDINGS:  Feeding tube in the stomach.  No significant bowel dilatation.  Impression: See above    Electronically signed by: Ej Strickland MD  Date:    11/14/2022  Time:    11:02      Labs, Imaging, EKG and  Diagnostic results from 11/23/2022 were reviewed.    Medications:  Medication list was reviewed and changes noted under Assessment/Plan.  Current Facility-Administered Medications on File Prior to Encounter   Medication Dose Route Frequency Provider Last Rate Last Admin    celecoxib capsule 400 mg  400 mg Oral Once Dieudonne Marshall MD        fentaNYL 50 mcg/mL injection  mcg   mcg Intravenous PRN Dieudonne Marshall MD   100 mcg at 12/21/21 0919    LIDOcaine (PF) 10 mg/ml (1%) injection 10 mg  1 mL Intradermal Once PRN Dieudonne Marshall MD        LIDOcaine (PF) 10 mg/ml (1%) injection 10 mg  1 mL Intradermal Once Dieudonne Marshall MD        midazolam (VERSED) 1 mg/mL injection 0.5-4 mg  0.5-4 mg Intravenous PRN Dieudonne Marshall MD   2 mg at 12/21/21 0919    ropivacaine 0.2% Nimbus PainPRO Pump infusion 500 ML   Perineural Continuous Dieudonne Marshall MD   New Bag at 12/21/21 1153     Current Outpatient Medications on File Prior to Encounter   Medication Sig Dispense Refill    ACETAMINOPHEN (TYLENOL ARTHRITIS PAIN ORAL) Take 1 tablet by mouth once daily.       albuterol (VENTOLIN HFA) 90 mcg/actuation inhaler Inhale 2 puffs into the lungs every 6 (six) hours as needed for Shortness of Breath. Rescue 18 g 0    amLODIPine (NORVASC) 10 MG tablet Take 1 tablet (10 mg total) by mouth once daily. 90 tablet 3    aspirin 325 MG tablet Take 1 tablet at lunch daily starting after surgery for 6 weeks to prevent DVT. 42 tablet 0    diclofenac sodium (VOLTAREN) 1 % Gel Apply 2 g topically 4 (four) times daily. for 10 days 400 g 0    epinastine 0.05 % ophthalmic solution Place 1 drop into both eyes once daily.       EUTHYROX 25 mcg tablet Take 1 tablet by mouth once daily 90 tablet 0    fish,bora,flax oils-om3,6,9no1 (OMEGA 3-6-9) 1,200 mg Cap Take 1 each by mouth once daily. 30 capsule 3    fluticasone propionate (FLONASE) 50 mcg/actuation nasal spray 1 spray (50 mcg total) by Each Nostril route 2  (two) times daily. 16 g 0    FLUZONE HIGHDOSE QUAD 20-21  mcg/0.7 mL Syrg ADM 0.7ML IM UTD      hydrALAZINE (APRESOLINE) 100 MG tablet TAKE 1 TABLET BY MOUTH THREE TIMES DAILY 90 tablet 0    HYDROcodone-acetaminophen (NORCO) 5-325 mg per tablet Take 1 tablet by mouth every 6 (six) hours as needed.      ibuprofen (ADVIL,MOTRIN) 800 MG tablet Take 800 mg by mouth every 8 (eight) hours as needed.      levocetirizine (XYZAL) 5 MG tablet Take 1 tablet (5 mg total) by mouth every evening. For sinus 30 tablet 0    lisinopriL (PRINIVIL,ZESTRIL) 40 MG tablet Take 1 tablet by mouth once daily 90 tablet 0    meloxicam (MOBIC) 15 MG tablet Take 1 tablet (15 mg total) by mouth daily as needed (arthritis). 30 tablet 2    methocarbamoL (ROBAXIN) 500 MG Tab Take 1 tablet (500 mg total) by mouth 3 (three) times daily as needed (muscle spasms). 40 tablet 0    metoprolol succinate (TOPROL-XL) 50 MG 24 hr tablet Take 1 tablet by mouth once daily 90 tablet 0    mupirocin (BACTROBAN) 2 % ointment Apply topically 2 (two) times daily.      tiZANidine (ZANAFLEX) 4 MG tablet Take 1 tablet by mouth twice daily as needed 20 tablet 0     Scheduled Medications:  sodium chloride 0.9%, , Intravenous, Once  aluminum-magnesium hydroxide-simethicone, 30 mL, Oral, Once   And  LIDOcaine HCl 2%, 15 mL, Oral, Once  amLODIPine, 10 mg, Per NG tube, Daily  atovaquone, 1,500 mg, Per NG tube, Daily  carvediloL, 25 mg, Per NG tube, BID  hydrALAZINE, 100 mg, Per NG tube, Q8H  levothyroxine, 25 mcg, Per NG tube, Before breakfast  nystatin, 500,000 Units, Oral, QID  pantoprazole, 40 mg, Oral, BID AC  predniSONE, 20 mg, Oral, Daily   Followed by  [START ON 12/4/2022] predniSONE, 12.5 mg, Oral, Daily   Followed by  [START ON 12/18/2022] predniSONE, 10 mg, Oral, Daily   Followed by  [START ON 1/1/2023] predniSONE, 5 mg, Oral, Daily  QUEtiapine, 25 mg, Oral, QHS  sodium bicarbonate, 650 mg, Oral, BID  sodium chloride 0.9% flush bag IVPB, , Intravenous, 1 time  in Clinic/HOD    PRN: sodium chloride, sodium chloride, acetaminophen, albuterol-ipratropium, aluminum-magnesium hydroxide-simethicone, bisacodyL, cloNIDine, dextrose 10%, dextrose 10%, glucagon (human recombinant), glucose, glucose, heparin (porcine), heparin (porcine), heparin, porcine (PF), heparin, porcine (PF), insulin aspart U-100, melatonin, methocarbamoL, naloxone, ondansetron, prochlorperazine, simethicone, sodium chloride 0.9%, sodium chloride 0.9%, sodium chloride 0.9%, sodium chloride 0.9%, sodium chloride 0.9%, sodium chloride 0.9%  Infusions:   Estimated Creatinine Clearance: 4.8 mL/min (A) (based on SCr of 8.3 mg/dL (H)).    Assessment/Plan:      * Microscopic polyangiitis  As of 10/26/22 strongly positive MPO Ab (in contrast to borderline positive PR3), consistent with clinical picture of microscopic polyangiitis with pulmonary, and renal involvement after presenting with acute hypoxemic respiratory failure, hemoptysis, and acute renal failure.  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   3)  CHANGES SUGGESTIVE OF FOCAL ACUTE PYELONEPHRITIS.   4)  MILD-TO-MODERATE ARTERIONEPHROSCLEROSIS   - Rheumatology consulted, appreciate evaluation and recommendations     - MITUL + 1:2560 homogenous, +dsDNA, normal complements     - PR3 1.2 (slight +),  (+)     - cANCA + 1:80, pANCA neg     - GBMAb neg, trace cryos  - Transfusion Medicine consulted for apheresis  - Nephrology consulted, see separate documentation for WILFREDO      Plan  - Steroids:    - s/p IV Solumedrol 1000 mg x3 doses.  Now following PEXIVAS steroid taper. Currently on IV Solumedrol 20 mg daily.   - plan for 20mg IV daily on 11/6 for 14 days (last dose of 20mg equivalent is 11/20/2022).   - apheresis: underwent PLEX 10/26, 10/27, 10/30, 11/1, 11/2, 11/3  - rituximab every 7 days for 4 doses: Dose #1: 10/27, #2 11/3, #3 11/10; Next Rituximab 375 mg/m^2 due Nov 17th   - cyclophosphamide every 14 days for 2 doses: 10/27, 11/10  - Opportunistic Infection ppx: atovaquone 1500mg PO daily  - GI bleed prophylaxis: pantoprazole 40mg daily   11/17 Rheumatology following for steroid dosing and chemotherapeutic agents. on IV steroids due to p.o. intake issues, however can transition to p.o. when swallowing issues reliably resolved - on steroid taper - solumedrol .  rituximab dose due November 17 11/18  switched to prednisone taper by nephrology          Gastric reflux  PPI BID  Elevated HOB; feeds changed to bolus and tolerating well  Symptoms initially improved however reoccurrence on 11/13 without significant dietary changes; GI planning for EGD 11/14  TF further adjusted for smaller, more frequent bolus   s/p EGD 11/14; Normal Study      High risk medications (not anticoagulants) long-term use  as above      Anuria    11/17 Nephrology following for HD and  monitoring for renal recovery with some  improvement in UOP in last 2 days. will need PermCath and HD chair set up if no renal  recovery.      .      Gastrointestinal hemorrhage with melena  Starting having hemoptysis and melena with associated acute blood loss anemia earlier in hospital stay. Patient with known internal hemorrhoids, mild sigmoid diverticulosis, history of gastritis and + H pylori (2012 EGD).   - GI consulted 10/19, unable to perform EGD due to instability and respiratory failure at the time    Plan  - patient unable to take PO PPI due to NGT removal on 11/5, transition to IV PPI 40mg pantoprazole daily, return to per NG tube once replaced  - s/p EGD 11/14; Normal  Study  - PPI transitioned to BID as concern for GERD  - Monitor CBC closely for signs of recurrent bleed  11/17 Hb trended dopwn to 6.6. Transfusion with 1 unit of PRBC .  111/8  continue protonix oral     Dysphagia  SLP following  MBSS showing global weakness in swallowing as well as aspiration with thin liquids.   ENT consulted but patient did not tolerate laryngoscopy     Plan   - Discussed case with Rheumatology team, they are concerned that this dysphagia may have been from prior stroke, requesting imaging for evaluation-  CT demonstrated no acute findings or causes for dysphagia NOR did MRI   - removed NGT and place dobhoff which is more comfortable and makes swallowing easier compared to NGT.    - continue working with speech therapy   -exam concerning for thrush; nystatin swish and swallow initiated; GI consulted as concern that oropharyngeal candidiasis may be contributing to dysphagia  -Per GI, Lower suspicion for esophageal candidiasis without odynophagia however can If white plaques have been seen on oropharynx, ok to start fluconazole empirically for possible esophageal candidiasis  -EGD on 11/14 without abnormality; per GI, Suspect some aspect of esophageal dysmotility in setting of critical illness. No further testing at this time.  -diet initiated per SLP on 11/16; NG tube removed  -continue to monitor p.o. intake closely  11/17 SLP recs Mechanical soft, Liquid Diet Level: Thin   11/22 advanced to renal diet     Moderate malnutrition  Nutrition consulted. Most recent weight and BMI monitored-          Malnutrition (Moderate to Severe)  Weight Loss (Malnutrition): 7.5% in 3 months              Measurements:  Wt Readings from Last 1 Encounters:   11/14/22 57.6 kg (127 lb)   Body mass index is 24 kg/m².    Recommendations: Recommendation/Intervention: 1. As tolerated, increase TF rate (of Novasource) to 35 mL/hr  - 2. RD to monitor & follow-up.  Goals: Meet % EEN, EPN by RD f/u date    Patient has  been screened and assessed by RD. RD will follow patient.      Encounter for continuous renal replacement therapy (CRRT) for acute renal failure  Due to microscopic polyangiitis. Remains on hemodialysis intermittently.   - Baseline creatinine 1.0-1.2.   - New onset proteinuria/hematuria.   - Renal biopsy completed and   - Trialysis in place for intermittent Hemodialysis    Plan  - Nephrology consulted, appreciate management  - management of MPA as noted separately  - Renal function panel daily  - renally dose all medications  - intermittent Hemodialysis as indicated, per Nephrology   11/17     Recent Labs   Lab 11/21/22  1059 11/22/22  0521 11/23/22  0507   * 117* 82*   CREATININE 10.1* 10.3* 8.3*     . Nephrology following for HD and  monitoring for renal recovery with some  improvement in UOP in last 2 days. will need PermCath and HD chair set up if no renal  recovery.  likely SNF pending nephro decision regarding HD   11/20  mL /24h. BUN/CR trending up to 113/9.8 , started on sodium bicarb for bicarb 19. continue lokelma 5g daily for 5.1   11/23 HD yesterday without ultrafiltration . UOP 500ml/24h     Acute hypoxemic respiratory failure  Multifocal pneumonia  Hemoptysis  Dyspnea  Diffuse Alveolar Hemorrahge  In the setting of a new diagnosis of microscopic polyangiitis, presented with fevers, dyspnea,.  - Chest imaging was consistent with diffuse alveolar hemorrhage.  CT chest wc 10/17 with bl perihilar dense consolidations w/ peripheral patcy areas of GGO, BL PNA, less c/f cardiogenic pulmonary edema.  - Patient upgraded to Medical ICU 10/23/22 with abrupt respiratory decline requiring NIPPV, improved with high dose IV steroids, plasmapheresis, emergent hemodialysis.   - Unable to perform bronchoscopy in the Medical ICU due to tenuous respiratory status  - As of 11/6, hypoxemia has significantly improved    Plan:  - weaned to room air  - continue management of underlying triggers with steroid and  immunosuppressants  - incentive spirometry and respiratory hygiene  11/17 sats 92% on RA    Lymphadenopathy of head and neck  - Has bilateral neck gland swelling with tenderness,consulted ENT  - No surgical intervention indicated   - Adenopathy likely reactive in nature   - Recommend further workup if it does not resolve within next 2 weeks     Hyponatremia  Has hyponatremia,started on NS. was given 2 grams of sodium chloride tablets TID for likely SIADH  11/18  sodium normalized at 137. salt tablets discontinued.      Acute blood loss anemia  In the setting of GI bleed with melena  - Evaluated by GI, see GI bleed documentation  - Last transfusion 10/28, Hgb slowly trending down since then  - Hgb 6.9 on 11/5      Plan  - Monitor CBC Daily   - Treat with pRBC transfusion PRN Hgb <7  - qualifies for transfusion on 11/5 with Hgb 6.9, 1u pRBC ordered  -stable   11/17 Hb trended dopwn to 6.6. Transfusion with 1 unit of PRBC .consider GI eval    Current CBC reviewed-    Recent Labs   Lab 11/21/22  0550 11/22/22  0521 11/23/22  0507   HGB 8.2* 8.7* 7.1*       Chronic diastolic heart failure  Pulmonary Hypertension due to left heart disease  TTE (10/2022) EF 65%, G1DD  Home meds: Lisinopril, Toprol     No signs or symptoms to suggest acute exacerbation    Plan  - Active volume control with intermittent Hemodialysis per Nephrology   - Daily weights (standing if tolerated)  - Strict I/Os  - Fluid restriction 1.5 day  - Cardiac diet w/ 2 g Na restriction      Hyperkalemia    resolved    Primary hypertension  BP Readings from Last 1 Encounters:   11/18/22 (Abnormal) 161/75     - Patient is unable to tolerate PO mediations, all meds to be administered via NG tube  -Will continue to monitor blood pressure trend closely and adjust antihypertensive regimen as clinically indicated and tolerated.    amLODIPine tablet 10 mg, 10 mg, Per NG tube, Daily    carvediloL tablet 12.5 mg, 12.5 mg, Per NG tube, BID    hydrALAZINE tablet 25 mg,  25 mg, Per NG tube, Q8H    lisinopriL tablet 40 mg, 40 mg, Per NG tube, Daily      Hypothyroid  - Continue synthroid 25 mcg  - TSH 0.585 10/27/22    VTE Risk Mitigation (From admission, onward)           Ordered     heparin (porcine) injection 1,000 Units  As needed (PRN)         11/22/22 0832     heparin, porcine (PF) 100 unit/mL injection flush 500 Units  As needed (PRN)         11/17/22 1343     heparin, porcine (PF) 100 unit/mL injection flush 500 Units  As needed (PRN)         11/10/22 1430     heparin (porcine) injection 1,000 Units  As needed (PRN)         11/09/22 1601     heparin (porcine) injection 1,000 Units  As needed (PRN)         11/08/22 0854     Place sequential compression device  Until discontinued         10/25/22 1731     IP VTE HIGH RISK PATIENT  Once         10/17/22 1354     Reason for No Pharmacological VTE Prophylaxis  Once        Question:  Reasons:  Answer:  Risk of Bleeding    10/17/22 1354                    Discharge Planning   ANTHONY: 11/29/2022     Code Status: Full Code   Is the patient medically ready for discharge?: No    Reason for patient still in hospital (select all that apply): Treatment  Discharge Plan A: Skilled Nursing Facility   Discharge Delays: None known at this time              West Quinn MD  Department of Hospital Medicine   [facility] - Intensive Care ([place]

## 2022-11-24 LAB
ALBUMIN SERPL BCP-MCNC: 2.2 G/DL (ref 3.5–5.2)
ANION GAP SERPL CALC-SCNC: 14 MMOL/L (ref 8–16)
BUN SERPL-MCNC: 84 MG/DL (ref 8–23)
CALCIUM SERPL-MCNC: 7.5 MG/DL (ref 8.7–10.5)
CHLORIDE SERPL-SCNC: 100 MMOL/L (ref 95–110)
CO2 SERPL-SCNC: 23 MMOL/L (ref 23–29)
CREAT SERPL-MCNC: 8.7 MG/DL (ref 0.5–1.4)
EST. GFR  (NO RACE VARIABLE): 4.4 ML/MIN/1.73 M^2
GLUCOSE SERPL-MCNC: 86 MG/DL (ref 70–110)
MAGNESIUM SERPL-MCNC: 1.9 MG/DL (ref 1.6–2.6)
PHOSPHATE SERPL-MCNC: 6.7 MG/DL (ref 2.7–4.5)
PHOSPHATE SERPL-MCNC: 6.7 MG/DL (ref 2.7–4.5)
POCT GLUCOSE: 108 MG/DL (ref 70–110)
POCT GLUCOSE: 132 MG/DL (ref 70–110)
POCT GLUCOSE: 135 MG/DL (ref 70–110)
POCT GLUCOSE: 164 MG/DL (ref 70–110)
POCT GLUCOSE: 92 MG/DL (ref 70–110)
POTASSIUM SERPL-SCNC: 3.9 MMOL/L (ref 3.5–5.1)
SODIUM SERPL-SCNC: 137 MMOL/L (ref 136–145)

## 2022-11-24 PROCEDURE — 99232 SBSQ HOSP IP/OBS MODERATE 35: CPT | Mod: ,,, | Performed by: STUDENT IN AN ORGANIZED HEALTH CARE EDUCATION/TRAINING PROGRAM

## 2022-11-24 PROCEDURE — 25000003 PHARM REV CODE 250: Performed by: HOSPITALIST

## 2022-11-24 PROCEDURE — 63600175 PHARM REV CODE 636 W HCPCS: Performed by: STUDENT IN AN ORGANIZED HEALTH CARE EDUCATION/TRAINING PROGRAM

## 2022-11-24 PROCEDURE — 94761 N-INVAS EAR/PLS OXIMETRY MLT: CPT

## 2022-11-24 PROCEDURE — 20600001 HC STEP DOWN PRIVATE ROOM

## 2022-11-24 PROCEDURE — 99232 PR SUBSEQUENT HOSPITAL CARE,LEVL II: ICD-10-PCS | Mod: ,,, | Performed by: STUDENT IN AN ORGANIZED HEALTH CARE EDUCATION/TRAINING PROGRAM

## 2022-11-24 PROCEDURE — 80069 RENAL FUNCTION PANEL: CPT | Performed by: HOSPITALIST

## 2022-11-24 PROCEDURE — 84100 ASSAY OF PHOSPHORUS: CPT

## 2022-11-24 PROCEDURE — 25000003 PHARM REV CODE 250: Performed by: STUDENT IN AN ORGANIZED HEALTH CARE EDUCATION/TRAINING PROGRAM

## 2022-11-24 PROCEDURE — 25000003 PHARM REV CODE 250

## 2022-11-24 PROCEDURE — 83735 ASSAY OF MAGNESIUM: CPT

## 2022-11-24 RX ADMIN — ATOVAQUONE 1500 MG: 750 SUSPENSION ORAL at 09:11

## 2022-11-24 RX ADMIN — SODIUM BICARBONATE 650 MG TABLET 650 MG: at 08:11

## 2022-11-24 RX ADMIN — PANTOPRAZOLE SODIUM 40 MG: 40 TABLET, DELAYED RELEASE ORAL at 06:11

## 2022-11-24 RX ADMIN — QUETIAPINE FUMARATE 25 MG: 25 TABLET ORAL at 08:11

## 2022-11-24 RX ADMIN — SODIUM BICARBONATE 650 MG TABLET 650 MG: at 09:11

## 2022-11-24 RX ADMIN — LEVOTHYROXINE SODIUM 25 MCG: 25 TABLET ORAL at 06:11

## 2022-11-24 RX ADMIN — NYSTATIN 500000 UNITS: 500000 SUSPENSION ORAL at 09:11

## 2022-11-24 RX ADMIN — CARVEDILOL 25 MG: 25 TABLET, FILM COATED ORAL at 09:11

## 2022-11-24 RX ADMIN — HYDRALAZINE HYDROCHLORIDE 100 MG: 50 TABLET ORAL at 10:11

## 2022-11-24 RX ADMIN — NYSTATIN 500000 UNITS: 500000 SUSPENSION ORAL at 04:11

## 2022-11-24 RX ADMIN — HYDRALAZINE HYDROCHLORIDE 100 MG: 50 TABLET ORAL at 01:11

## 2022-11-24 RX ADMIN — AMLODIPINE BESYLATE 10 MG: 10 TABLET ORAL at 10:11

## 2022-11-24 RX ADMIN — NYSTATIN 500000 UNITS: 500000 SUSPENSION ORAL at 08:11

## 2022-11-24 RX ADMIN — CARVEDILOL 25 MG: 25 TABLET, FILM COATED ORAL at 08:11

## 2022-11-24 RX ADMIN — PREDNISONE 20 MG: 20 TABLET ORAL at 09:11

## 2022-11-24 RX ADMIN — HYDRALAZINE HYDROCHLORIDE 100 MG: 50 TABLET ORAL at 06:11

## 2022-11-24 RX ADMIN — PANTOPRAZOLE SODIUM 40 MG: 40 TABLET, DELAYED RELEASE ORAL at 04:11

## 2022-11-24 RX ADMIN — NYSTATIN 500000 UNITS: 500000 SUSPENSION ORAL at 01:11

## 2022-11-24 NOTE — ASSESSMENT & PLAN NOTE
Patient with acute kidney injury likely due to microscopic polyangiitis WILFREDO is currently stable. Labs reviewed- Renal function/electrolytes with Estimated Creatinine Clearance: 4.6 mL/min (A) (based on SCr of 8.7 mg/dL (H)). according to latest data. Monitor urine output and serial BMP and adjust therapy as needed. Avoid nephrotoxins and renally dose meds for GFR listed above.

## 2022-11-24 NOTE — SUBJECTIVE & OBJECTIVE
Interval History: see above    Review of Systems   Constitutional:  Negative for fatigue and fever.   HENT: Negative.     Respiratory:  Negative for chest tightness and shortness of breath.    Cardiovascular:  Negative for chest pain and leg swelling.   Gastrointestinal:  Negative for abdominal pain, constipation, diarrhea and nausea.   Genitourinary:  Negative for difficulty urinating and dysuria.        Urinary volume returning to normal   Musculoskeletal: Negative.    Skin:  Negative for rash.   Neurological:  Negative for light-headedness and headaches.   Hematological:  Does not bruise/bleed easily.   Psychiatric/Behavioral:  Negative for agitation and behavioral problems.    Objective:     Vital Signs (Most Recent):  Temp: 97.8 °F (36.6 °C) (11/24/22 1216)  Pulse: 66 (11/24/22 1216)  Resp: 18 (11/24/22 1216)  BP: (!) 111/55 (11/24/22 1216)  SpO2: 100 % (11/24/22 1216)   Vital Signs (24h Range):  Temp:  [97.5 °F (36.4 °C)-98.1 °F (36.7 °C)] 97.8 °F (36.6 °C)  Pulse:  [61-67] 66  Resp:  [16-18] 18  SpO2:  [92 %-100 %] 100 %  BP: (111-133)/(55-60) 111/55     Weight: 57.6 kg (126 lb 15.8 oz)  Body mass index is 23.99 kg/m².    Intake/Output Summary (Last 24 hours) at 11/24/2022 1434  Last data filed at 11/24/2022 0410  Gross per 24 hour   Intake --   Output 600 ml   Net -600 ml      Physical Exam  Constitutional:       General: She is not in acute distress.  HENT:      Head: Normocephalic and atraumatic.      Mouth/Throat:      Mouth: Mucous membranes are moist.      Pharynx: Oropharynx is clear.   Eyes:      General: No scleral icterus.  Cardiovascular:      Rate and Rhythm: Normal rate and regular rhythm.      Heart sounds: No murmur heard.    No gallop.   Pulmonary:      Effort: Pulmonary effort is normal.      Breath sounds: Normal breath sounds.   Abdominal:      General: Bowel sounds are normal. There is no distension.      Tenderness: There is no abdominal tenderness.   Musculoskeletal:      Right lower  leg: Edema present.      Left lower leg: Edema present.      Comments: 1+ bilateral LE edema   Skin:     General: Skin is warm and dry.   Neurological:      Mental Status: She is alert and oriented to person, place, and time. Mental status is at baseline.   Psychiatric:         Mood and Affect: Mood normal.         Behavior: Behavior normal.       Significant Labs: All pertinent labs within the past 24 hours have been reviewed.  Recent Lab Results  (Last 5 results in the past 24 hours)        11/24/22  1214   11/24/22  0843   11/24/22  0647   11/24/22  0500   11/23/22  2345        Albumin       2.2         Anion Gap       14         BUN       84         Calcium       7.5         Chloride       100         CO2       23         Creatinine       8.7         eGFR       4.4         Glucose       86         Magnesium       1.9         Phosphorus       6.7                6.7         POCT Glucose 135   108   92     103       Potassium       3.9         Sodium       137                                Significant Imaging: I have reviewed all pertinent imaging results/findings within the past 24 hours.

## 2022-11-24 NOTE — PLAN OF CARE
Problem: Adult Inpatient Plan of Care  Goal: Absence of Hospital-Acquired Illness or Injury  Outcome: Ongoing, Progressing     Problem: Adult Inpatient Plan of Care  Goal: Optimal Comfort and Wellbeing  Outcome: Ongoing, Progressing     Problem: Adult Inpatient Plan of Care  Goal: Readiness for Transition of Care  Outcome: Ongoing, Progressing     Problem: Infection  Goal: Absence of Infection Signs and Symptoms  Outcome: Ongoing, Progressing    AAOx4, on RA, reports no pain or discomfort, safety measures discussed and in place, up in chair for the majority of day.

## 2022-11-24 NOTE — PROGRESS NOTES
[facility] ([address] Medicine  Progress Note    Patient Name: Kristin Goodman  MRN: [id number]  Patient Class: IP- Inpatient   Admission Date: 10/17/2022  Length of Stay: 38 days  Attending Physician: Ej Fregoso MD  Primary Care Provider: Lori Hernandez MD        Subjective:     Principal Problem:Microscopic polyangiitis        HPI:  Kristin Goodman is a 77 y.o. female with a past medical history of HTN, hypothyroidism, HFpEF, and osteoarthritis of the arm who has presented to the ED for cough, SOB, and weakness. Daughter is present at the bedside. Patient presented to the ED on 9/24 with hemoptysis, CT and CXR showed high suspicion for multilobar pneumonia. Patient was discharged with a 10-day course of levofloxacin and an albuterol inhaler. Patient followed up with her PCP on 10/4 with slight improvement in symptoms and went back to work. Patient continued to have worsening symptoms and presented to the ED again on 10/14 for evaluation and no interventions were done. Patient endorses fevers over the last few days up to 102.4 F with progressive SOB. She endorses hemoptysis with moderate amount of blood, generalized weakness, productive cough, SOB, and loss of appetite. Denies chest pain, nausea, vomiting, abdominal pain, or urinary changes.    ED: hypertensive up to 219/93 and tachycardic up to 117. Oxygen saturation on 92% on RA, placed on 5L NC with sats >97%. CBC remarkable for leukocytosis of 16.03 and Hb 7.7. K 3.3. Cr 1.6, baseline ~1.0. . Troponin 0.061. COVID and flu negative. EKG NSR. CT chest with contrast pending at time of admission. Given home amlodipine and lisinopril. Given 500mL NS, IV azithromycin, cefepime and vanc.       Overview/Hospital Course:  Ms. Goodman was admitted to Hospital Medicine for management of multifocal pneumonia and acute hypoxemic respiratory failure after presenting to the ED with fevers, cough, hemoptysis, and dyspnea. She required  escalation to the Medical ICU due to abrupt decline in her respiratory status, associated with hemoptysis and requiring NIPPV. CT chest showed bilateral patchy ground glass opacities. Labs additionally were notable for acute renal failure on CKD3. Pulmonology was consulted while under the care of [facility] and felt this picture was consistent with diffuse alveolar hemorrhage.     Her workup revealed a strongly positive MPO Ab consistent with clinical picture of microscopic polyangiitis with pulmonary and renal involvement. She underwent Trialysis catheter placement 10/25/2022 in the Medical ICU. 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     Rheumatology was consulted, extensive workup revealed MITUL + 1:2560 homogenous, +dsDNA, normal complements, PR3 1.2 (slight +),  (+), cANCA + 1:80, pANCA neg, GBMAb neg, trace cryos. Due to concern for MPA, she was started on pulse dose IV methylprednisolone 1000mg for 3 days, and plasmapheresis under the guidance of Transfusion Medicine. She remains on a steroid taper and has completed PLEX. She additionally received rituximab and cyclophosphamide. OI prophylaxis was provided with atovaquone due to a sulfa allergy.     Nephrology was consulted for evaluation of acute renal failure in the setting of MPA. She did require SLED in the ICU for renal clearance and remains on intermittent hemodialysis. She is expected to continue HD once discharged.     Her acute hypoxemic respiratory failure improved and she was weaned off of supplemental oxygen. She stepped down to [facility] 10/28.     She underwent MBBS for evaluation of dysphagia, which showed global delayed initiation of swallow and aspiration with thin liquids. Due to  concern for possible prior stroke, head imaging was completed and negative for acute finding. She is NPO with NG tube. ENT was consulted for evaluation of dysphagia and cervical adenopathy.  Patient did not tolerate laryngoscopy; unable to visualize vocal cords but given her lack of hoarseness, they did not suspect vocal fold paresis/paralysis. MRI recommended by rheum to fully rule out any potential neurological cause of the patient's dysphagia; but demonstrated   remote left thalamic lacunar type infarct and punctate remote left cerebellar infarcts.  She is continuing to work with speech therapy.  EGD completed 11/14 without abnormalities.  Per GI, Suspect some aspect of esophageal dysmotility in setting of critical illness. No further testing at this time.  Per SLP, diet advanced on 11/15 and NG tube removed.    Her other chronic medical conditions including hypothyroidism, diastolic CHF, and hypertension were managed with her home medications, with dose adjustments as needed.     11/17 Hb trended dopwn to 6.6. Transfusion with 1 unit of PRBC .  Rheumatology following for steroid dosing and chemotherapeutic agents. on IV steroids due to p.o. intake issues, however can transition to p.o. when swallowing issues reliably resolved - on steroid taper - solumedrol   rituximab dose due November 17; has been . Nephrology following for HD and  monitoring for renal recovery with some  improvement in UOP in last 2 days. will need PermCath and HD chair set up if no renal  recovery.  likely SNF pending nephro decision regarding HD . No need for HD today. Continue 2 grams of sodium chloride tablets TID for likely SIADH. SLP recs Mechanical soft, Liquid Diet Level: Thin   11/18  sodium normalized at 137. salt tablets discontinued. Hb at 8.5 s/p 1 U PRBC. UOP 400mL /24h.  K at 5 . switched to prednisone taper by nephrology .started lokelma 5 mg x 3days  11/19  mL /24h. BUN/CR trending up to 102/9.3 ,no HD for now per  nephro  11/20  mL /24h. BUN/CR trending up to 113/9.8 , started on sodium bicarb for bicarb 19. continue lokelma 5g daily for 5.1 .   11/21 nephrology follow up  -No urgent indication for HD today as with no signs of uremic encephalopathy or  O2 requirements  11/22 HD x1 today for uremia   11/23 Hb 7.1 . HD yesterday without ultrafiltration . UOp 500ml/24h   11/24: Hgb up to 7.6 today. Patient looks and feels well. Having good UOP of >1L In last 24 hours. Cr up to 8.7 with BUN 84 today.        Interval History: see above    Review of Systems   Constitutional:  Negative for fatigue and fever.   HENT: Negative.     Respiratory:  Negative for chest tightness and shortness of breath.    Cardiovascular:  Negative for chest pain and leg swelling.   Gastrointestinal:  Negative for abdominal pain, constipation, diarrhea and nausea.   Genitourinary:  Negative for difficulty urinating and dysuria.        Urinary volume returning to normal   Musculoskeletal: Negative.    Skin:  Negative for rash.   Neurological:  Negative for light-headedness and headaches.   Hematological:  Does not bruise/bleed easily.   Psychiatric/Behavioral:  Negative for agitation and behavioral problems.    Objective:     Vital Signs (Most Recent):  Temp: 97.8 °F (36.6 °C) (11/24/22 1216)  Pulse: 66 (11/24/22 1216)  Resp: 18 (11/24/22 1216)  BP: (!) 111/55 (11/24/22 1216)  SpO2: 100 % (11/24/22 1216)   Vital Signs (24h Range):  Temp:  [97.5 °F (36.4 °C)-98.1 °F (36.7 °C)] 97.8 °F (36.6 °C)  Pulse:  [61-67] 66  Resp:  [16-18] 18  SpO2:  [92 %-100 %] 100 %  BP: (111-133)/(55-60) 111/55     Weight: 57.6 kg (126 lb 15.8 oz)  Body mass index is 23.99 kg/m².    Intake/Output Summary (Last 24 hours) at 11/24/2022 4850  Last data filed at 11/24/2022 0410  Gross per 24 hour   Intake --   Output 600 ml   Net -600 ml      Physical Exam  Constitutional:       General: She is not in acute distress.  HENT:      Head: Normocephalic and atraumatic.       Mouth/Throat:      Mouth: Mucous membranes are moist.      Pharynx: Oropharynx is clear.   Eyes:      General: No scleral icterus.  Cardiovascular:      Rate and Rhythm: Normal rate and regular rhythm.      Heart sounds: No murmur heard.    No gallop.   Pulmonary:      Effort: Pulmonary effort is normal.      Breath sounds: Normal breath sounds.   Abdominal:      General: Bowel sounds are normal. There is no distension.      Tenderness: There is no abdominal tenderness.   Musculoskeletal:      Right lower leg: Edema present.      Left lower leg: Edema present.      Comments: 1+ bilateral LE edema   Skin:     General: Skin is warm and dry.   Neurological:      Mental Status: She is alert and oriented to person, place, and time. Mental status is at baseline.   Psychiatric:         Mood and Affect: Mood normal.         Behavior: Behavior normal.       Significant Labs: All pertinent labs within the past 24 hours have been reviewed.  Recent Lab Results  (Last 5 results in the past 24 hours)        11/24/22  1214   11/24/22  0843   11/24/22  0647   11/24/22  0500   11/23/22  2345        Albumin       2.2         Anion Gap       14         BUN       84         Calcium       7.5         Chloride       100         CO2       23         Creatinine       8.7         eGFR       4.4         Glucose       86         Magnesium       1.9         Phosphorus       6.7                6.7         POCT Glucose 135   108   92     103       Potassium       3.9         Sodium       137                                Significant Imaging: I have reviewed all pertinent imaging results/findings within the past 24 hours.      Assessment/Plan:      * Microscopic polyangiitis  As of 10/26/22 strongly positive MPO Ab (in contrast to borderline positive PR3), consistent with clinical picture of microscopic polyangiitis with pulmonary, and renal involvement after presenting with acute hypoxemic respiratory failure, hemoptysis, and acute renal  failure.  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   3)  CHANGES SUGGESTIVE OF FOCAL ACUTE PYELONEPHRITIS.   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 Now following PEXIVAS steroid taper. Currently on IV Solumedrol 20 mg daily.   - plan for 20mg IV daily on 11/6 for 14 days (last dose of 20mg equivalent is 11/20/2022).   - apheresis: underwent PLEX 10/26, 10/27, 10/30, 11/1, 11/2, 11/3  - rituximab every 7 days for 4 doses: Dose #1: 10/27, #2 11/3, #3 11/10, and #4 given 11/17  - cyclophosphamide every 14 days for 2 doses: 10/27, 11/10  - Opportunistic Infection ppx: atovaquone 1500mg PO daily  - GI bleed prophylaxis: pantoprazole 40mg daily   11/17 Rheumatology following for steroid dosing and chemotherapeutic agents. on IV steroids due to p.o. intake issues, however can transition to p.o. when swallowing issues reliably resolved - on steroid taper - solumedrol .  rituximab dose due November 17 11/18  switched to prednisone taper by nephrology  11/24: No changes. Having good UOP but Cr still greater than 8. Main concern at this time is whether patient will need to go on dialysis or not. She did get  one session two days ago. UOP is improving which is of course encouraging. Has triaylisis line still in place if needed. Nephro following      Primary pauci-immune necrotizing and crescentic glomerulonephritis  Patient with acute kidney injury likely due to microscopic polyangiitis WILFREDO is currently stable. Labs reviewed- Renal function/electrolytes with Estimated Creatinine Clearance: 4.6 mL/min (A) (based on SCr of 8.7 mg/dL (H)). according to latest data. Monitor urine output and serial BMP and adjust therapy as needed. Avoid nephrotoxins and renally dose meds for GFR listed above.       Gastric reflux  PPI BID  Elevated HOB; feeds changed to bolus and tolerating well  Symptoms initially improved however reoccurrence on 11/13 without significant dietary changes; GI planning for EGD 11/14  TF further adjusted for smaller, more frequent bolus   s/p EGD 11/14; Normal Study      High risk medications (not anticoagulants) long-term use  as above      Anuria    11/17 Nephrology following for HD and  monitoring for renal recovery with some  improvement in UOP in last 2 days. will need PermCath and HD chair set up if no renal  recovery.     11/24: Still following to determine need for HD although anuria has resolved    Gastrointestinal hemorrhage with melena  Starting having hemoptysis and melena with associated acute blood loss anemia earlier in hospital stay. Patient with known internal hemorrhoids, mild sigmoid diverticulosis, history of gastritis and + H pylori (2012 EGD).   - GI consulted 10/19, unable to perform EGD due to instability and respiratory failure at the time    Plan  - patient unable to take PO PPI due to NGT removal on 11/5, transition to IV PPI 40mg pantoprazole daily, return to per NG tube once replaced  - s/p EGD 11/14; Normal Study  - PPI transitioned to BID as concern for GERD  - Monitor CBC closely for signs of recurrent bleed  11/17 Hb trended dopwn to 6.6. Transfusion with 1 unit of PRBC  .  111/8  continue protonix oral     Dysphagia  SLP following  MBSS showing global weakness in swallowing as well as aspiration with thin liquids.   ENT consulted but patient did not tolerate laryngoscopy     Plan   - Discussed case with Rheumatology team, they are concerned that this dysphagia may have been from prior stroke, requesting imaging for evaluation-  CT demonstrated no acute findings or causes for dysphagia NOR did MRI   - removed NGT and place dobhoff which is more comfortable and makes swallowing easier compared to NGT.    - continue working with speech therapy   -exam concerning for thrush; nystatin swish and swallow initiated; GI consulted as concern that oropharyngeal candidiasis may be contributing to dysphagia  -Per GI, Lower suspicion for esophageal candidiasis without odynophagia however can If white plaques have been seen on oropharynx, ok to start fluconazole empirically for possible esophageal candidiasis  -EGD on 11/14 without abnormality; per GI, Suspect some aspect of esophageal dysmotility in setting of critical illness. No further testing at this time.  -diet initiated per SLP on 11/16; NG tube removed  -continue to monitor p.o. intake closely  11/17 SLP recs Mechanical soft, Liquid Diet Level: Thin   11/22 advanced to renal diet     Moderate malnutrition  Nutrition consulted. Most recent weight and BMI monitored-          Malnutrition (Moderate to Severe)  Weight Loss (Malnutrition): 7.5% in 3 months              Measurements:  Wt Readings from Last 1 Encounters:   11/14/22 57.6 kg (127 lb)   Body mass index is 24 kg/m².    Recommendations: Recommendation/Intervention: 1. As tolerated, increase TF rate (of Novasource) to 35 mL/hr  - 2. RD to monitor & follow-up.  Goals: Meet % EEN, EPN by RD f/u date    Patient has been screened and assessed by RD. RD will follow patient.      Encounter for continuous renal replacement therapy (CRRT) for acute renal failure  Due to microscopic  polyangiitis. Remains on hemodialysis intermittently.   - Baseline creatinine 1.0-1.2.   - New onset proteinuria/hematuria.   - Renal biopsy completed and   - Trialysis in place for intermittent Hemodialysis    Plan  - Nephrology consulted, appreciate management  - management of MPA as noted separately  - Renal function panel daily  - renally dose all medications  - intermittent Hemodialysis as indicated, per Nephrology   11/17     Recent Labs   Lab 11/21/22  1059 11/22/22  0521 11/23/22  0507   * 117* 82*   CREATININE 10.1* 10.3* 8.3*     . Nephrology following for HD and  monitoring for renal recovery with some  improvement in UOP in last 2 days. will need PermCath and HD chair set up if no renal  recovery.  likely SNF pending nephro decision regarding HD   11/20  mL /24h. BUN/CR trending up to 113/9.8 , started on sodium bicarb for bicarb 19. continue lokelma 5g daily for 5.1   11/23 HD yesterday without ultrafiltration . UOP 500ml/24h   11/24: UOP 1100 cc in 24hr    Acute hypoxemic respiratory failure  Multifocal pneumonia  Hemoptysis  Dyspnea  Diffuse Alveolar Hemorrahge  In the setting of a new diagnosis of microscopic polyangiitis, presented with fevers, dyspnea,.  - Chest imaging was consistent with diffuse alveolar hemorrhage.  CT chest wc 10/17 with bl perihilar dense consolidations w/ peripheral patcy areas of GGO, BL PNA, less c/f cardiogenic pulmonary edema.  - Patient upgraded to Medical ICU 10/23/22 with abrupt respiratory decline requiring NIPPV, improved with high dose IV steroids, plasmapheresis, emergent hemodialysis.   - Unable to perform bronchoscopy in the Medical ICU due to tenuous respiratory status  - As of 11/6, hypoxemia has significantly improved    Plan:  - weaned to room air  - continue management of underlying triggers with steroid and immunosuppressants  - incentive spirometry and respiratory hygiene  11/17 sats 92% on RA    Lymphadenopathy of head and neck  - Has  bilateral neck gland swelling with tenderness,consulted ENT  - No surgical intervention indicated   - Adenopathy likely reactive in nature   - Recommend further workup if it does not resolve within next 2 weeks     Hyponatremia  Has hyponatremia,started on NS. was given 2 grams of sodium chloride tablets TID for likely SIADH  11/18  sodium normalized at 137. salt tablets discontinued.      Acute blood loss anemia  In the setting of GI bleed with melena  - Evaluated by GI, see GI bleed documentation  - Last transfusion 10/28, Hgb slowly trending down since then  - Hgb 6.9 on 11/5      Plan  - Monitor CBC Daily   - Treat with pRBC transfusion PRN Hgb <7  - qualifies for transfusion on 11/5 with Hgb 6.9, 1u pRBC ordered  -stable   11/17 Hb trended dopwn to 6.6. Transfusion with 1 unit of PRBC .consider GI eval  11/24: Stable Hgb and vitals    Current CBC reviewed-    Recent Labs   Lab 11/21/22  0550 11/22/22  0521 11/23/22  0507   HGB 8.2* 8.7* 7.1*       Chronic diastolic heart failure  Pulmonary Hypertension due to left heart disease  TTE (10/2022) EF 65%, G1DD  Home meds: Lisinopril, Toprol     No signs or symptoms to suggest acute exacerbation    Plan  - Active volume control with intermittent Hemodialysis per Nephrology   - Daily weights (standing if tolerated)  - Strict I/Os  - Fluid restriction 1.5 day  - Cardiac diet w/ 2 g Na restriction      Hyperkalemia    resolved    Primary hypertension  BP Readings from Last 1 Encounters:   11/18/22 (Abnormal) 161/75     - Patient is unable to tolerate PO mediations, all meds to be administered via NG tube  -Will continue to monitor blood pressure trend closely and adjust antihypertensive regimen as clinically indicated and tolerated.    amLODIPine tablet 10 mg, 10 mg, Per NG tube, Daily    carvediloL tablet 12.5 mg, 12.5 mg, Per NG tube, BID    hydrALAZINE tablet 25 mg, 25 mg, Per NG tube, Q8H    lisinopriL tablet 40 mg, 40 mg, Per NG tube, Daily    11/24: NG tube  has been removed and now taking medications by mouth      Hypothyroid  - Continue synthroid 25 mcg  - TSH 0.585 10/27/22      VTE Risk Mitigation (From admission, onward)         Ordered     heparin (porcine) injection 1,000 Units  As needed (PRN)         11/22/22 0832     heparin, porcine (PF) 100 unit/mL injection flush 500 Units  As needed (PRN)         11/17/22 1343     heparin, porcine (PF) 100 unit/mL injection flush 500 Units  As needed (PRN)         11/10/22 1430     heparin (porcine) injection 1,000 Units  As needed (PRN)         11/09/22 1601     heparin (porcine) injection 1,000 Units  As needed (PRN)         11/08/22 0854     Place sequential compression device  Until discontinued         10/25/22 1731     IP VTE HIGH RISK PATIENT  Once         10/17/22 1354     Reason for No Pharmacological VTE Prophylaxis  Once        Question:  Reasons:  Answer:  Risk of Bleeding    10/17/22 1354                Discharge Planning   ANTHONY: 11/29/2022     Code Status: Full Code   Is the patient medically ready for discharge?: No    Reason for patient still in hospital (select all that apply): Treatment  Discharge Plan A: Skilled Nursing Facility   Discharge Delays: None known at this time              Ej Fregoso MD  Department of Hospital Medicine   [facility] - Intensive Care ([facility])

## 2022-11-24 NOTE — PLAN OF CARE
Problem: Adult Inpatient Plan of Care  Goal: Plan of Care Review  Outcome: Ongoing, Progressing  Goal: Absence of Hospital-Acquired Illness or Injury  Outcome: Ongoing, Progressing  Goal: Optimal Comfort and Wellbeing  Outcome: Ongoing, Progressing     Problem: Infection  Goal: Absence of Infection Signs and Symptoms  Outcome: Ongoing, Progressing     Problem: Glycemic Control Impaired (Sepsis/Septic Shock)  Goal: Blood Glucose Level Within Desired Range  Outcome: Ongoing, Progressing     Problem: Infection Progression (Sepsis/Septic Shock)  Goal: Absence of Infection Signs and Symptoms  Outcome: Ongoing, Progressing     Problem: Renal Function Impairment (Acute Kidney Injury/Impairment)  Goal: Effective Renal Function  Outcome: Ongoing, Progressing     Problem: Fall Injury Risk  Goal: Absence of Fall and Fall-Related Injury  Outcome: Ongoing, Progressing     Problem: Impaired Wound Healing  Goal: Optimal Wound Healing  Outcome: Ongoing, Progressing

## 2022-11-25 LAB
ALBUMIN SERPL BCP-MCNC: 2.2 G/DL (ref 3.5–5.2)
ANION GAP SERPL CALC-SCNC: 14 MMOL/L (ref 8–16)
BASOPHILS # BLD AUTO: 0 K/UL (ref 0–0.2)
BASOPHILS NFR BLD: 0 % (ref 0–1.9)
BLD PROD TYP BPU: NORMAL
BLOOD UNIT EXPIRATION DATE: NORMAL
BLOOD UNIT TYPE CODE: 7300
BLOOD UNIT TYPE: NORMAL
BUN SERPL-MCNC: 91 MG/DL (ref 8–23)
CALCIUM SERPL-MCNC: 7.5 MG/DL (ref 8.7–10.5)
CHLORIDE SERPL-SCNC: 100 MMOL/L (ref 95–110)
CO2 SERPL-SCNC: 22 MMOL/L (ref 23–29)
CODING SYSTEM: NORMAL
CREAT SERPL-MCNC: 8.9 MG/DL (ref 0.5–1.4)
DIFFERENTIAL METHOD: ABNORMAL
DISPENSE STATUS: NORMAL
EOSINOPHIL # BLD AUTO: 0 K/UL (ref 0–0.5)
EOSINOPHIL NFR BLD: 0 % (ref 0–8)
ERYTHROCYTE [DISTWIDTH] IN BLOOD BY AUTOMATED COUNT: 14.6 % (ref 11.5–14.5)
EST. GFR  (NO RACE VARIABLE): 4.3 ML/MIN/1.73 M^2
FERRITIN SERPL-MCNC: 588 NG/ML (ref 20–300)
GLUCOSE SERPL-MCNC: 72 MG/DL (ref 70–110)
HCT VFR BLD AUTO: 20.2 % (ref 37–48.5)
HGB BLD-MCNC: 6.5 G/DL (ref 12–16)
HISTONE IGG SER IA-ACNC: 2.5 UNITS (ref 0–0.9)
IMM GRANULOCYTES # BLD AUTO: 0.11 K/UL (ref 0–0.04)
IMM GRANULOCYTES NFR BLD AUTO: 1.4 % (ref 0–0.5)
IRON SERPL-MCNC: 44 UG/DL (ref 30–160)
LYMPHOCYTES # BLD AUTO: 1.4 K/UL (ref 1–4.8)
LYMPHOCYTES NFR BLD: 17.9 % (ref 18–48)
MAGNESIUM SERPL-MCNC: 1.9 MG/DL (ref 1.6–2.6)
MCH RBC QN AUTO: 29.4 PG (ref 27–31)
MCHC RBC AUTO-ENTMCNC: 32.2 G/DL (ref 32–36)
MCV RBC AUTO: 91 FL (ref 82–98)
MONOCYTES # BLD AUTO: 0.7 K/UL (ref 0.3–1)
MONOCYTES NFR BLD: 8.7 % (ref 4–15)
NEUTROPHILS # BLD AUTO: 5.6 K/UL (ref 1.8–7.7)
NEUTROPHILS NFR BLD: 72 % (ref 38–73)
NRBC BLD-RTO: 0 /100 WBC
PHOSPHATE SERPL-MCNC: 6.7 MG/DL (ref 2.7–4.5)
PHOSPHATE SERPL-MCNC: 6.7 MG/DL (ref 2.7–4.5)
PLATELET # BLD AUTO: 102 K/UL (ref 150–450)
PMV BLD AUTO: 12 FL (ref 9.2–12.9)
POCT GLUCOSE: 121 MG/DL (ref 70–110)
POCT GLUCOSE: 172 MG/DL (ref 70–110)
POCT GLUCOSE: 89 MG/DL (ref 70–110)
POTASSIUM SERPL-SCNC: 4.3 MMOL/L (ref 3.5–5.1)
RBC # BLD AUTO: 2.21 M/UL (ref 4–5.4)
SATURATED IRON: 22 % (ref 20–50)
SODIUM SERPL-SCNC: 136 MMOL/L (ref 136–145)
TOTAL IRON BINDING CAPACITY: 203 UG/DL (ref 250–450)
TRANS ERYTHROCYTES VOL PATIENT: NORMAL ML
TRANSFERRIN SERPL-MCNC: 137 MG/DL (ref 200–375)
WBC # BLD AUTO: 7.78 K/UL (ref 3.9–12.7)

## 2022-11-25 PROCEDURE — 99232 PR SUBSEQUENT HOSPITAL CARE,LEVL II: ICD-10-PCS | Mod: ,,, | Performed by: STUDENT IN AN ORGANIZED HEALTH CARE EDUCATION/TRAINING PROGRAM

## 2022-11-25 PROCEDURE — 63600175 PHARM REV CODE 636 W HCPCS: Mod: JG | Performed by: INTERNAL MEDICINE

## 2022-11-25 PROCEDURE — 36415 COLL VENOUS BLD VENIPUNCTURE: CPT

## 2022-11-25 PROCEDURE — 84466 ASSAY OF TRANSFERRIN: CPT | Performed by: STUDENT IN AN ORGANIZED HEALTH CARE EDUCATION/TRAINING PROGRAM

## 2022-11-25 PROCEDURE — 85025 COMPLETE CBC W/AUTO DIFF WBC: CPT | Performed by: STUDENT IN AN ORGANIZED HEALTH CARE EDUCATION/TRAINING PROGRAM

## 2022-11-25 PROCEDURE — 25000003 PHARM REV CODE 250: Performed by: HOSPITALIST

## 2022-11-25 PROCEDURE — 25000003 PHARM REV CODE 250: Performed by: STUDENT IN AN ORGANIZED HEALTH CARE EDUCATION/TRAINING PROGRAM

## 2022-11-25 PROCEDURE — 25000003 PHARM REV CODE 250

## 2022-11-25 PROCEDURE — 99233 PR SUBSEQUENT HOSPITAL CARE,LEVL III: ICD-10-PCS | Mod: ,,, | Performed by: INTERNAL MEDICINE

## 2022-11-25 PROCEDURE — 99233 SBSQ HOSP IP/OBS HIGH 50: CPT | Mod: ,,, | Performed by: INTERNAL MEDICINE

## 2022-11-25 PROCEDURE — 20600001 HC STEP DOWN PRIVATE ROOM

## 2022-11-25 PROCEDURE — 80069 RENAL FUNCTION PANEL: CPT

## 2022-11-25 PROCEDURE — 63600175 PHARM REV CODE 636 W HCPCS: Performed by: STUDENT IN AN ORGANIZED HEALTH CARE EDUCATION/TRAINING PROGRAM

## 2022-11-25 PROCEDURE — 63600175 PHARM REV CODE 636 W HCPCS: Performed by: INTERNAL MEDICINE

## 2022-11-25 PROCEDURE — 82728 ASSAY OF FERRITIN: CPT | Performed by: STUDENT IN AN ORGANIZED HEALTH CARE EDUCATION/TRAINING PROGRAM

## 2022-11-25 PROCEDURE — 83735 ASSAY OF MAGNESIUM: CPT

## 2022-11-25 PROCEDURE — P9021 RED BLOOD CELLS UNIT: HCPCS | Performed by: STUDENT IN AN ORGANIZED HEALTH CARE EDUCATION/TRAINING PROGRAM

## 2022-11-25 PROCEDURE — 99232 SBSQ HOSP IP/OBS MODERATE 35: CPT | Mod: ,,, | Performed by: STUDENT IN AN ORGANIZED HEALTH CARE EDUCATION/TRAINING PROGRAM

## 2022-11-25 RX ORDER — HYDROCODONE BITARTRATE AND ACETAMINOPHEN 500; 5 MG/1; MG/1
TABLET ORAL
Status: DISCONTINUED | OUTPATIENT
Start: 2022-11-25 | End: 2022-12-03

## 2022-11-25 RX ORDER — METOCLOPRAMIDE HYDROCHLORIDE 5 MG/ML
10 INJECTION INTRAMUSCULAR; INTRAVENOUS EVERY 6 HOURS PRN
Status: DISCONTINUED | OUTPATIENT
Start: 2022-11-25 | End: 2022-11-26

## 2022-11-25 RX ORDER — METOCLOPRAMIDE HYDROCHLORIDE 5 MG/ML
10 INJECTION INTRAMUSCULAR; INTRAVENOUS EVERY 6 HOURS
Status: DISCONTINUED | OUTPATIENT
Start: 2022-11-25 | End: 2022-11-25

## 2022-11-25 RX ADMIN — PANTOPRAZOLE SODIUM 40 MG: 40 TABLET, DELAYED RELEASE ORAL at 05:11

## 2022-11-25 RX ADMIN — CARVEDILOL 25 MG: 25 TABLET, FILM COATED ORAL at 12:11

## 2022-11-25 RX ADMIN — PANTOPRAZOLE SODIUM 40 MG: 40 TABLET, DELAYED RELEASE ORAL at 03:11

## 2022-11-25 RX ADMIN — ERYTHROPOIETIN 2880 UNITS: 3000 INJECTION, SOLUTION INTRAVENOUS; SUBCUTANEOUS at 03:11

## 2022-11-25 RX ADMIN — NYSTATIN 500000 UNITS: 500000 SUSPENSION ORAL at 12:11

## 2022-11-25 RX ADMIN — INSULIN ASPART 2 UNITS: 100 INJECTION, SOLUTION INTRAVENOUS; SUBCUTANEOUS at 08:11

## 2022-11-25 RX ADMIN — METOCLOPRAMIDE 10 MG: 5 INJECTION, SOLUTION INTRAMUSCULAR; INTRAVENOUS at 10:11

## 2022-11-25 RX ADMIN — SODIUM BICARBONATE 650 MG TABLET 650 MG: at 08:11

## 2022-11-25 RX ADMIN — NYSTATIN 500000 UNITS: 500000 SUSPENSION ORAL at 08:11

## 2022-11-25 RX ADMIN — SODIUM BICARBONATE 650 MG TABLET 650 MG: at 12:11

## 2022-11-25 RX ADMIN — PREDNISONE 20 MG: 20 TABLET ORAL at 12:11

## 2022-11-25 RX ADMIN — HYDRALAZINE HYDROCHLORIDE 100 MG: 50 TABLET ORAL at 05:11

## 2022-11-25 RX ADMIN — ONDANSETRON 4 MG: 2 INJECTION INTRAMUSCULAR; INTRAVENOUS at 09:11

## 2022-11-25 RX ADMIN — LEVOTHYROXINE SODIUM 25 MCG: 25 TABLET ORAL at 05:11

## 2022-11-25 RX ADMIN — ATOVAQUONE 1500 MG: 750 SUSPENSION ORAL at 12:11

## 2022-11-25 RX ADMIN — HYDRALAZINE HYDROCHLORIDE 100 MG: 50 TABLET ORAL at 09:11

## 2022-11-25 RX ADMIN — CARVEDILOL 25 MG: 25 TABLET, FILM COATED ORAL at 08:11

## 2022-11-25 RX ADMIN — AMLODIPINE BESYLATE 10 MG: 10 TABLET ORAL at 12:11

## 2022-11-25 RX ADMIN — QUETIAPINE FUMARATE 25 MG: 25 TABLET ORAL at 08:11

## 2022-11-25 RX ADMIN — NYSTATIN 500000 UNITS: 500000 SUSPENSION ORAL at 04:11

## 2022-11-25 NOTE — PROGRESS NOTES
[facility] ([place] [address])  Nephrology  Progress Note    Patient Name: Kristin Goodman  MRN: [id number]  Admission Date: 10/17/2022  Hospital Length of Stay: 39 days  Attending Provider: Ej Fregoso MD   Primary Care Physician: Lori Hernandez MD  Principal Problem:Microscopic polyangiitis    Subjective:     HPI: Kristin Goodman is a 77 year old female with hypertension, hypothyroidism, HFpEF who presents for cough, shortness of breath, and weakness.  Patient initially presented to ER on 09/24 with hemoptysis and imaging concerning for multilobar pneumonia at which time she was discharged on a 10 day course of levofloxacin.  Patient initially had some improvement but began having worsening symptoms on 10/14.  Patient describes multiple fevers and progressive shortness of breath.  She continues to have intermittent hemoptysis.  Patient with worsening leukocytosis and limited clinical improvement despite broad-spectrum antibiotics.  She remains on cefepime.  Patient is a noted to have baseline creatinine of 1 as recent as 8/2022 but progressive worsening of creatinine in early October.  On admission patient with creatinine of 1.6 (10/17) with subsequent progression and creatinine of 3.0 at time of consultation (10/23).  Nephrology consulted for acute kidney injury.      Interval History: No acute events overnight. Complaining of some nausea this AM, antiemetics administered. Reports improving urine output however I/Os not charted.     Review of patient's allergies indicates:   Allergen Reactions    Ampicillin     Peaches [peach (prunus persica)] Other (See Comments)     Pt unable to state type of reaction. Information obtained from daughter who states she was informed of allergy from patient.    Penicillins      Other reaction(s): Hives    Sulfa (sulfonamide antibiotics) Rash and Hives     Current Facility-Administered Medications   Medication Frequency    0.9%  NaCl infusion (for blood administration)  Q24H PRN    0.9%  NaCl infusion (for blood administration) Q24H PRN    0.9%  NaCl infusion Once    acetaminophen tablet 650 mg Q4H PRN    albuterol-ipratropium 2.5 mg-0.5 mg/3 mL nebulizer solution 3 mL Q4H PRN    aluminum-magnesium hydroxide-simethicone 200-200-20 mg/5 mL suspension 30 mL QID PRN    aluminum-magnesium hydroxide-simethicone 200-200-20 mg/5 mL suspension 30 mL Once    And    LIDOcaine HCl 2% oral solution 15 mL Once    amLODIPine tablet 10 mg Daily    atovaquone 750 mg/5 mL oral liquid 1,500 mg Daily    bisacodyL suppository 10 mg Daily PRN    carvediloL tablet 25 mg BID    cloNIDine tablet 0.1 mg Q6H PRN    dextrose 10% bolus 125 mL PRN    dextrose 10% bolus 250 mL PRN    epoetin hira injection 2,880 Units Every Mon, Wed, Fri    glucagon (human recombinant) injection 1 mg PRN    glucose chewable tablet 16 g PRN    glucose chewable tablet 24 g PRN    heparin (porcine) injection 1,000 Units PRN    heparin (porcine) injection 1,000 Units PRN    heparin, porcine (PF) 100 unit/mL injection flush 500 Units PRN    heparin, porcine (PF) 100 unit/mL injection flush 500 Units PRN    hydrALAZINE tablet 100 mg Q8H    insulin aspart U-100 pen 1-10 Units Q6H PRN    levothyroxine tablet 25 mcg Before breakfast    melatonin tablet 6 mg Nightly PRN    methocarbamoL tablet 500 mg TID PRN    metoclopramide HCl injection 10 mg Q6H PRN    naloxone 0.4 mg/mL injection 0.02 mg PRN    nystatin 100,000 unit/mL suspension 500,000 Units QID    ondansetron injection 4 mg Q8H PRN    pantoprazole EC tablet 40 mg BID AC    predniSONE tablet 20 mg Daily    Followed by    [START ON 12/4/2022] predniSONE tablet 12.5 mg Daily    Followed by    [START ON 12/18/2022] predniSONE tablet 10 mg Daily    Followed by    [START ON 1/1/2023] predniSONE tablet 5 mg Daily    prochlorperazine injection Soln 5 mg Q6H PRN    QUEtiapine tablet 25 mg QHS    simethicone chewable tablet 80 mg QID PRN    sodium bicarbonate tablet 650 mg BID    sodium  chloride 0.9% 250 mL flush bag 1 time in Clinic/HOD    sodium chloride 0.9% bolus 250 mL PRN    sodium chloride 0.9% flush 10 mL PRN    sodium chloride 0.9% flush 10 mL PRN    sodium chloride 0.9% flush 10 mL PRN    sodium chloride 0.9% flush 10 mL PRN    sodium chloride 0.9% flush 5 mL PRN     Facility-Administered Medications Ordered in Other Encounters   Medication Frequency    celecoxib capsule 400 mg Once    fentaNYL 50 mcg/mL injection  mcg PRN    LIDOcaine (PF) 10 mg/ml (1%) injection 10 mg Once PRN    LIDOcaine (PF) 10 mg/ml (1%) injection 10 mg Once    midazolam (VERSED) 1 mg/mL injection 0.5-4 mg PRN    ropivacaine 0.2% [facility] PainPRO Pump infusion 500 ML Continuous       Objective:     Vital Signs (Most Recent):  Temp: 98.3 °F (36.8 °C) (11/25/22 1218)  Pulse: 63 (11/25/22 1210)  Resp: 17 (11/25/22 0817)  BP: (!) 121/58 (11/25/22 1210)  SpO2: 97 % (11/25/22 1210)  O2 Device (Oxygen Therapy): room air (11/25/22 0419) Vital Signs (24h Range):  Temp:  [96.1 °F (35.6 °C)-98.7 °F (37.1 °C)] 98.3 °F (36.8 °C)  Pulse:  [62-66] 63  Resp:  [17-20] 17  SpO2:  [97 %-100 %] 97 %  BP: (104-121)/(53-58) 121/58     Weight: 57.6 kg (126 lb 15.8 oz) (11/22/22 1328)  Body mass index is 23.99 kg/m².  Body surface area is 1.57 meters squared.    I/O last 3 completed shifts:  In: -   Out: 600 [Urine:600]    Physical Exam  Vitals and nursing note reviewed.   Constitutional:       General: She is awake.      Appearance: She is well-developed. She is ill-appearing. She is not toxic-appearing or diaphoretic.   HENT:      Head: Normocephalic and atraumatic.      Right Ear: External ear normal.      Left Ear: External ear normal.      Nose:      Comments: + NGT     Mouth/Throat:      Mouth: Mucous membranes are dry.   Eyes:      General: Lids are normal. No scleral icterus.        Right eye: No discharge.         Left eye: No discharge.   Cardiovascular:      Rate and Rhythm: Normal rate and regular rhythm.   Pulmonary:       "Effort: Pulmonary effort is normal.      Breath sounds: Normal breath sounds. No wheezing or rhonchi.   Abdominal:      General: Bowel sounds are normal.      Palpations: Abdomen is soft.      Tenderness: There is no abdominal tenderness.   Musculoskeletal:         General: No deformity.      Cervical back: Normal range of motion and neck supple. No rigidity or tenderness.      Right lower leg: No edema.      Left lower leg: No edema.   Skin:     General: Skin is warm and dry.   Neurological:      General: No focal deficit present.      Mental Status: She is alert and oriented to person, place, and time. Mental status is at baseline.   Psychiatric:         Attention and Perception: Attention normal.         Behavior: Behavior normal.       Significant Labs:  CBC:   Recent Labs   Lab 11/25/22  0255   WBC 7.78   RBC 2.21*   HGB 6.5*   HCT 20.2*   *   MCV 91   MCH 29.4   MCHC 32.2       CMP:   Recent Labs   Lab 11/25/22 0255   GLU 72   CALCIUM 7.5*   ALBUMIN 2.2*      K 4.3   CO2 22*      BUN 91*   CREATININE 8.9*          Significant Imaging:  Labs: Reviewed    Assessment/Plan:     * Microscopic polyangiitis  See "Primary pauci-immune necrotizing and crescentic glomerulonephritis"      Primary pauci-immune necrotizing and crescentic glomerulonephritis  Kidney biopsy (10/26): pauci immune necrotizing and crescentic glomerulonephritis   s/p IV Solumedrol 1000 mg x3 doses.  PLEX 10/26, 10/27, 10/29, 10/30, 11/1, 11/2, 11/3 (prior to Rituxan)  Rituximab 375 mg/m^2 (600 mg) on 10/27 11/3, 11/10 and 11/17 (completed 4 doses)  Cyclophosphamide 7.5 mg/kg on 10/27 and 11/10 ( every 14 days ); has completed 2 doses   Serum BUN/Cr slowly trending up; will continue monitoring daily for dialysis needs   Now following PEXIVAS steroid taper; currently on Prednisone 20mg PO daily   Continue Atovaquone for prophylaxis  Strict I/Os and chart   Avoid nephrotoxic agents as much as possible         James " MD Jason  Nephrology  [facility] ([facility]    ATTENDING PHYSICIAN ATTESTATION  I have personally verified the history and examined the patient. I thoroughly reviewed the demographic, clinical, laboratorial and imaging information available in medical records. I agree with the assessment and recommendations provided by the subspecialty resident who was under my supervision.

## 2022-11-25 NOTE — SUBJECTIVE & OBJECTIVE
Interval History: No acute events overnight. Complaining of some nausea this AM, antiemetics administered. Reports improving urine output however I/Os not charted.     Review of patient's allergies indicates:   Allergen Reactions    Ampicillin     Peaches [peach (prunus persica)] Other (See Comments)     Pt unable to state type of reaction. Information obtained from daughter who states she was informed of allergy from patient.    Penicillins      Other reaction(s): Hives    Sulfa (sulfonamide antibiotics) Rash and Hives     Current Facility-Administered Medications   Medication Frequency    0.9%  NaCl infusion (for blood administration) Q24H PRN    0.9%  NaCl infusion (for blood administration) Q24H PRN    0.9%  NaCl infusion Once    acetaminophen tablet 650 mg Q4H PRN    albuterol-ipratropium 2.5 mg-0.5 mg/3 mL nebulizer solution 3 mL Q4H PRN    aluminum-magnesium hydroxide-simethicone 200-200-20 mg/5 mL suspension 30 mL QID PRN    aluminum-magnesium hydroxide-simethicone 200-200-20 mg/5 mL suspension 30 mL Once    And    LIDOcaine HCl 2% oral solution 15 mL Once    amLODIPine tablet 10 mg Daily    atovaquone 750 mg/5 mL oral liquid 1,500 mg Daily    bisacodyL suppository 10 mg Daily PRN    carvediloL tablet 25 mg BID    cloNIDine tablet 0.1 mg Q6H PRN    dextrose 10% bolus 125 mL PRN    dextrose 10% bolus 250 mL PRN    epoetin hira injection 2,880 Units Every Mon, Wed, Fri    glucagon (human recombinant) injection 1 mg PRN    glucose chewable tablet 16 g PRN    glucose chewable tablet 24 g PRN    heparin (porcine) injection 1,000 Units PRN    heparin (porcine) injection 1,000 Units PRN    heparin, porcine (PF) 100 unit/mL injection flush 500 Units PRN    heparin, porcine (PF) 100 unit/mL injection flush 500 Units PRN    hydrALAZINE tablet 100 mg Q8H    insulin aspart U-100 pen 1-10 Units Q6H PRN    levothyroxine tablet 25 mcg Before breakfast    melatonin tablet 6 mg Nightly PRN    methocarbamoL tablet 500 mg TID  PRN    metoclopramide HCl injection 10 mg Q6H PRN    naloxone 0.4 mg/mL injection 0.02 mg PRN    nystatin 100,000 unit/mL suspension 500,000 Units QID    ondansetron injection 4 mg Q8H PRN    pantoprazole EC tablet 40 mg BID AC    predniSONE tablet 20 mg Daily    Followed by    [START ON 12/4/2022] predniSONE tablet 12.5 mg Daily    Followed by    [START ON 12/18/2022] predniSONE tablet 10 mg Daily    Followed by    [START ON 1/1/2023] predniSONE tablet 5 mg Daily    prochlorperazine injection Soln 5 mg Q6H PRN    QUEtiapine tablet 25 mg QHS    simethicone chewable tablet 80 mg QID PRN    sodium bicarbonate tablet 650 mg BID    sodium chloride 0.9% 250 mL flush bag 1 time in Clinic/HOD    sodium chloride 0.9% bolus 250 mL PRN    sodium chloride 0.9% flush 10 mL PRN    sodium chloride 0.9% flush 10 mL PRN    sodium chloride 0.9% flush 10 mL PRN    sodium chloride 0.9% flush 10 mL PRN    sodium chloride 0.9% flush 5 mL PRN     Facility-Administered Medications Ordered in Other Encounters   Medication Frequency    celecoxib capsule 400 mg Once    fentaNYL 50 mcg/mL injection  mcg PRN    LIDOcaine (PF) 10 mg/ml (1%) injection 10 mg Once PRN    LIDOcaine (PF) 10 mg/ml (1%) injection 10 mg Once    midazolam (VERSED) 1 mg/mL injection 0.5-4 mg PRN    ropivacaine 0.2% [facility] PainPRO Pump infusion 500 ML Continuous       Objective:     Vital Signs (Most Recent):  Temp: 98.3 °F (36.8 °C) (11/25/22 1218)  Pulse: 63 (11/25/22 1210)  Resp: 17 (11/25/22 0817)  BP: (!) 121/58 (11/25/22 1210)  SpO2: 97 % (11/25/22 1210)  O2 Device (Oxygen Therapy): room air (11/25/22 0419) Vital Signs (24h Range):  Temp:  [96.1 °F (35.6 °C)-98.7 °F (37.1 °C)] 98.3 °F (36.8 °C)  Pulse:  [62-66] 63  Resp:  [17-20] 17  SpO2:  [97 %-100 %] 97 %  BP: (104-121)/(53-58) 121/58     Weight: 57.6 kg (126 lb 15.8 oz) (11/22/22 1328)  Body mass index is 23.99 kg/m².  Body surface area is 1.57 meters squared.    I/O last 3 completed shifts:  In: -   Out:  600 [Urine:600]    Physical Exam  Vitals and nursing note reviewed.   Constitutional:       General: She is awake.      Appearance: She is well-developed. She is ill-appearing. She is not toxic-appearing or diaphoretic.   HENT:      Head: Normocephalic and atraumatic.      Right Ear: External ear normal.      Left Ear: External ear normal.      Nose:      Comments: + NGT     Mouth/Throat:      Mouth: Mucous membranes are dry.   Eyes:      General: Lids are normal. No scleral icterus.        Right eye: No discharge.         Left eye: No discharge.   Cardiovascular:      Rate and Rhythm: Normal rate and regular rhythm.   Pulmonary:      Effort: Pulmonary effort is normal.      Breath sounds: Normal breath sounds. No wheezing or rhonchi.   Abdominal:      General: Bowel sounds are normal.      Palpations: Abdomen is soft.      Tenderness: There is no abdominal tenderness.   Musculoskeletal:         General: No deformity.      Cervical back: Normal range of motion and neck supple. No rigidity or tenderness.      Right lower leg: No edema.      Left lower leg: No edema.   Skin:     General: Skin is warm and dry.   Neurological:      General: No focal deficit present.      Mental Status: She is alert and oriented to person, place, and time. Mental status is at baseline.   Psychiatric:         Attention and Perception: Attention normal.         Behavior: Behavior normal.       Significant Labs:  CBC:   Recent Labs   Lab 11/25/22  0255   WBC 7.78   RBC 2.21*   HGB 6.5*   HCT 20.2*   *   MCV 91   MCH 29.4   MCHC 32.2       CMP:   Recent Labs   Lab 11/25/22  0255   GLU 72   CALCIUM 7.5*   ALBUMIN 2.2*      K 4.3   CO2 22*      BUN 91*   CREATININE 8.9*          Significant Imaging:  Labs: Reviewed

## 2022-11-25 NOTE — PLAN OF CARE
Problem: Adult Inpatient Plan of Care  Goal: Plan of Care Review  Outcome: Ongoing, Progressing  Goal: Absence of Hospital-Acquired Illness or Injury  Outcome: Ongoing, Progressing  Goal: Optimal Comfort and Wellbeing  Outcome: Ongoing, Progressing     Problem: Fall Injury Risk  Goal: Absence of Fall and Fall-Related Injury  Outcome: Ongoing, Progressing     Problem: Impaired Wound Healing  Goal: Optimal Wound Healing  Outcome: Ongoing, Progressing

## 2022-11-25 NOTE — ASSESSMENT & PLAN NOTE
Pulmonary Hypertension due to left heart disease  TTE (10/2022) EF 65%, G1DD  Home meds: Lisinopril, Toprol        Plan  - Active volume control with intermittent Hemodialysis per Nephrology   - Daily weights (standing if tolerated)  - Strict I/Os  - Fluid restriction 1.5 day  - Cardiac diet w/ 2 g Na restriction    11/25: Does have LE edema as mentioned but more likely secondary to renal failure than heart failure and will continue current management. On room air breathing comfortably

## 2022-11-25 NOTE — ASSESSMENT & PLAN NOTE
As of 10/26/22 strongly positive MPO Ab (in contrast to borderline positive PR3), consistent with clinical picture of microscopic polyangiitis with pulmonary, and renal involvement after presenting with acute hypoxemic respiratory failure, hemoptysis, and acute renal failure.  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   3)  CHANGES SUGGESTIVE OF FOCAL ACUTE PYELONEPHRITIS.   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 Now following PEXIVAS steroid taper. Currently on IV Solumedrol 20 mg daily.   - plan for 20mg IV daily on 11/6 for 14 days (last dose of 20mg equivalent is 11/20/2022).   - apheresis: underwent PLEX 10/26, 10/27, 10/30, 11/1, 11/2, 11/3  - rituximab every 7 days for 4 doses: Dose #1: 10/27, #2 11/3, #3 11/10, and #4 given 11/17  - cyclophosphamide every 14 days for 2 doses: 10/27, 11/10  - Opportunistic Infection ppx: atovaquone 1500mg PO daily  - GI bleed prophylaxis: pantoprazole 40mg daily   11/17 Rheumatology following for steroid dosing and chemotherapeutic agents. on IV steroids due to p.o. intake issues, however can transition to p.o. when swallowing issues reliably resolved - on  steroid taper - solumedrol .  rituximab dose due November 17 11/18  switched to prednisone taper by nephrology  11/24: No changes. Having good UOP but Cr still greater than 8. Main concern at this time is whether patient will need to go on dialysis or not. She did get one session two days ago. UOP is improving which is of course encouraging. Has triaylisis line still in place if needed. Nephro following  11/25: No changes, still following BMP and UOP to determine ultimate need for HD. Renal following

## 2022-11-25 NOTE — ASSESSMENT & PLAN NOTE
Due to microscopic polyangiitis. Remains on hemodialysis intermittently.   - Baseline creatinine 1.0-1.2.   - New onset proteinuria/hematuria.   - Renal biopsy completed and   - Trialysis in place for intermittent Hemodialysis    Plan  - Nephrology consulted, appreciate management  - management of MPA as noted separately  - Renal function panel daily  - renally dose all medications  - intermittent Hemodialysis as indicated, per Nephrology   11/17     Recent Labs   Lab 11/23/22  0507 11/24/22  0500 11/25/22  0255   BUN 82* 84* 91*   CREATININE 8.3* 8.7* 8.9*     . Nephrology following for HD and  monitoring for renal recovery with some  improvement in UOP in last 2 days. will need PermCath and HD chair set up if no renal  recovery.  likely SNF pending nephro decision regarding HD   11/20  mL /24h. BUN/CR trending up to 113/9.8 , started on sodium bicarb for bicarb 19. continue lokelma 5g daily for 5.1   11/23 HD yesterday without ultrafiltration . UOP 500ml/24h   11/24: UOP 1100 cc in 24hr  11/25: So far today urinated 400cc by about 11am

## 2022-11-25 NOTE — PLAN OF CARE
Problem: Adult Inpatient Plan of Care  Goal: Patient-Specific Goal (Individualized)  Outcome: Ongoing, Progressing     Problem: Adult Inpatient Plan of Care  Goal: Absence of Hospital-Acquired Illness or Injury  Outcome: Ongoing, Progressing     Problem: Adult Inpatient Plan of Care  Goal: Optimal Comfort and Wellbeing  Outcome: Ongoing, Progressing     Problem: Adjustment to Illness (Sepsis/Septic Shock)  Goal: Optimal Coping  Outcome: Ongoing, Progressing     AAOx4, on RA, reports mild nausea. Meds administered, safety measures discussed with pt and daughter and in place, up in chair for a few hours. 1U PRBC administered.

## 2022-11-25 NOTE — ASSESSMENT & PLAN NOTE
In the setting of GI bleed with melena  - Evaluated by GI, see GI bleed documentation  - Last transfusion 10/28, Hgb slowly trending down since then  - Hgb 6.9 on 11/5      Plan  - Monitor CBC Daily   - Treat with pRBC transfusion PRN Hgb <7  - qualifies for transfusion on 11/5 with Hgb 6.9, 1u pRBC ordered  -stable   11/17 Hb trended dopwn to 6.6. Transfusion with 1 unit of PRBC .consider GI eval  11/24: Stable Hgb and vitals  11/25: Hgb trending down today and will need 1 u PRBC. Denies further melena. Will check iron panel, B12, folate, retic count in AM, FOBT, and consider reconsult to GI for consideration of colonoscopy. Also could be contribution from renal disease. HDS    Current CBC reviewed-    Recent Labs   Lab 11/23/22  0507 11/23/22  1518 11/25/22  0255   HGB 7.1* 7.6* 6.5*

## 2022-11-25 NOTE — ASSESSMENT & PLAN NOTE
BP Readings from Last 1 Encounters:   11/25/22 (!) 104/54     - Patient is unable to tolerate PO mediations, all meds to be administered via NG tube  -Will continue to monitor blood pressure trend closely and adjust antihypertensive regimen as clinically indicated and tolerated.    amLODIPine tablet 10 mg, 10 mg, Per NG tube, Daily    carvediloL tablet 12.5 mg, 12.5 mg, Per NG tube, BID    hydrALAZINE tablet 25 mg, 25 mg, Per NG tube, Q8H    lisinopriL tablet 40 mg, 40 mg, Per NG tube, Daily    11/24: NG tube has been removed and now taking medications by mouth

## 2022-11-25 NOTE — ASSESSMENT & PLAN NOTE
Starting having hemoptysis and melena with associated acute blood loss anemia earlier in hospital stay. Patient with known internal hemorrhoids, mild sigmoid diverticulosis, history of gastritis and + H pylori (2012 EGD).   - GI consulted 10/19, unable to perform EGD due to instability and respiratory failure at the time    Plan  - patient unable to take PO PPI due to NGT removal on 11/5, transition to IV PPI 40mg pantoprazole daily, return to per NG tube once replaced  - s/p EGD 11/14; Normal Study  - PPI transitioned to BID as concern for GERD  - Monitor CBC closely for signs of recurrent bleed  11/17 Hb trended dopwn to 6.6. Transfusion with 1 unit of PRBC .  111/8  continue protonix oral     11/25: Hgb trending down today and will need 1 u PRBC. Denies further melena. Will check iron panel, B12, folate, retic count in AM, FOBT, and consider reconsult to GI for consideration of colonoscopy. Also could be contribution from renal disease. HDS

## 2022-11-25 NOTE — PROGRESS NOTES
[facility] ([address] Medicine  Progress Note    Patient Name: Kristin Goodman  MRN: [id number]  Patient Class: IP- Inpatient   Admission Date: 10/17/2022  Length of Stay: 39 days  Attending Physician: Ej Fregoso MD  Primary Care Provider: Lori Hernandez MD        Subjective:     Principal Problem:Microscopic polyangiitis        HPI:  Kristin Goodman is a 77 y.o. female with a past medical history of HTN, hypothyroidism, HFpEF, and osteoarthritis of the arm who has presented to the ED for cough, SOB, and weakness. Daughter is present at the bedside. Patient presented to the ED on 9/24 with hemoptysis, CT and CXR showed high suspicion for multilobar pneumonia. Patient was discharged with a 10-day course of levofloxacin and an albuterol inhaler. Patient followed up with her PCP on 10/4 with slight improvement in symptoms and went back to work. Patient continued to have worsening symptoms and presented to the ED again on 10/14 for evaluation and no interventions were done. Patient endorses fevers over the last few days up to 102.4 F with progressive SOB. She endorses hemoptysis with moderate amount of blood, generalized weakness, productive cough, SOB, and loss of appetite. Denies chest pain, nausea, vomiting, abdominal pain, or urinary changes.    ED: hypertensive up to 219/93 and tachycardic up to 117. Oxygen saturation on 92% on RA, placed on 5L NC with sats >97%. CBC remarkable for leukocytosis of 16.03 and Hb 7.7. K 3.3. Cr 1.6, baseline ~1.0. . Troponin 0.061. COVID and flu negative. EKG NSR. CT chest with contrast pending at time of admission. Given home amlodipine and lisinopril. Given 500mL NS, IV azithromycin, cefepime and vanc.       Overview/Hospital Course:  Ms. Goodman was admitted to Hospital Medicine for management of multifocal pneumonia and acute hypoxemic respiratory failure after presenting to the ED with fevers, cough, hemoptysis, and dyspnea. She required  escalation to the Medical ICU due to abrupt decline in her respiratory status, associated with hemoptysis and requiring NIPPV. CT chest showed bilateral patchy ground glass opacities. Labs additionally were notable for acute renal failure on CKD3. Pulmonology was consulted while under the care of [facility] and felt this picture was consistent with diffuse alveolar hemorrhage.     Her workup revealed a strongly positive MPO Ab consistent with clinical picture of microscopic polyangiitis with pulmonary and renal involvement. She underwent Trialysis catheter placement 10/25/2022 in the Medical ICU. 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     Rheumatology was consulted, extensive workup revealed MITUL + 1:2560 homogenous, +dsDNA, normal complements, PR3 1.2 (slight +),  (+), cANCA + 1:80, pANCA neg, GBMAb neg, trace cryos. Due to concern for MPA, she was started on pulse dose IV methylprednisolone 1000mg for 3 days, and plasmapheresis under the guidance of Transfusion Medicine. She remains on a steroid taper and has completed PLEX. She additionally received rituximab and cyclophosphamide. OI prophylaxis was provided with atovaquone due to a sulfa allergy.     Nephrology was consulted for evaluation of acute renal failure in the setting of MPA. She did require SLED in the ICU for renal clearance and remains on intermittent hemodialysis. She is expected to continue HD once discharged.     Her acute hypoxemic respiratory failure improved and she was weaned off of supplemental oxygen. She stepped down to [facility] 10/28.     She underwent MBBS for evaluation of dysphagia, which showed global delayed initiation of swallow and aspiration with thin liquids. Due to  concern for possible prior stroke, head imaging was completed and negative for acute finding. She is NPO with NG tube. ENT was consulted for evaluation of dysphagia and cervical adenopathy.  Patient did not tolerate laryngoscopy; unable to visualize vocal cords but given her lack of hoarseness, they did not suspect vocal fold paresis/paralysis. MRI recommended by rheum to fully rule out any potential neurological cause of the patient's dysphagia; but demonstrated   remote left thalamic lacunar type infarct and punctate remote left cerebellar infarcts.  She is continuing to work with speech therapy.  EGD completed 11/14 without abnormalities.  Per GI, Suspect some aspect of esophageal dysmotility in setting of critical illness. No further testing at this time.  Per SLP, diet advanced on 11/15 and NG tube removed.    Her other chronic medical conditions including hypothyroidism, diastolic CHF, and hypertension were managed with her home medications, with dose adjustments as needed.     11/17 Hb trended dopwn to 6.6. Transfusion with 1 unit of PRBC .  Rheumatology following for steroid dosing and chemotherapeutic agents. on IV steroids due to p.o. intake issues, however can transition to p.o. when swallowing issues reliably resolved - on steroid taper - solumedrol   rituximab dose due November 17; has been . Nephrology following for HD and  monitoring for renal recovery with some  improvement in UOP in last 2 days. will need PermCath and HD chair set up if no renal  recovery.  likely SNF pending nephro decision regarding HD . No need for HD today. Continue 2 grams of sodium chloride tablets TID for likely SIADH. SLP recs Mechanical soft, Liquid Diet Level: Thin   11/18  sodium normalized at 137. salt tablets discontinued. Hb at 8.5 s/p 1 U PRBC. UOP 400mL /24h.  K at 5 . switched to prednisone taper by nephrology .started lokelma 5 mg x 3days  11/19  mL /24h. BUN/CR trending up to 102/9.3 ,no HD for now per  nephro  11/20  mL /24h. BUN/CR trending up to 113/9.8 , started on sodium bicarb for bicarb 19. continue lokelma 5g daily for 5.1 .   11/21 nephrology follow up  -No urgent indication for HD today as with no signs of uremic encephalopathy or  O2 requirements  11/22 HD x1 today for uremia   11/23 Hb 7.1 . HD yesterday without ultrafiltration . UOp 500ml/24h   11/24: Hgb up to 7.6 today. Patient looks and feels well. Having good UOP of >1L In last 24 hours. Cr up to 8.7 with BUN 84 today.  11/25: Hgb down to 6.5 today, will transfuse one unit and work up for anemia (did have melena recently although normal EGD). Vitals stable. Does have nausea today giving zofran and metoclopramide. 400cc UOP so far today. Cr trending up slightly        Interval History: see above    Review of Systems   Constitutional:  Negative for fatigue and fever.   HENT: Negative.     Respiratory:  Negative for chest tightness and shortness of breath.    Cardiovascular:  Negative for chest pain and leg swelling.   Gastrointestinal:  Positive for nausea. Negative for abdominal pain, constipation and diarrhea.   Genitourinary:  Negative for difficulty urinating and dysuria.        So far today urinated one time, 400cc   Musculoskeletal: Negative.    Skin:  Negative for rash.   Neurological:  Negative for light-headedness and headaches.   Hematological:  Does not bruise/bleed easily.   Psychiatric/Behavioral:  Negative for agitation and behavioral problems.    Objective:     Vital Signs (Most Recent):  Temp: 96.5 °F (35.8 °C) (11/25/22 0817)  Pulse: 66 (11/25/22 0817)  Resp: 18 (11/25/22 0419)  BP: (!) 104/54 (11/25/22 0817)  SpO2: 98 % (11/25/22 0817)   Vital Signs (24h Range):  Temp:  [96.5 °F (35.8 °C)-98.7 °F (37.1 °C)] 96.5 °F (35.8 °C)  Pulse:  [62-66] 66  Resp:  [17-20] 18  SpO2:  [98 %-100 %] 98 %  BP: (104-117)/(53-58) 104/54     Weight: 57.6 kg (126 lb 15.8 oz)  Body mass index is 23.99 kg/m².  No intake or output data in the 24  hours ending 11/25/22 1058     Physical Exam  Constitutional:       General: She is not in acute distress.  HENT:      Head: Normocephalic and atraumatic.      Mouth/Throat:      Mouth: Mucous membranes are moist.      Pharynx: Oropharynx is clear.   Eyes:      General: No scleral icterus.  Cardiovascular:      Rate and Rhythm: Normal rate and regular rhythm.      Heart sounds: No murmur heard.    No gallop.   Pulmonary:      Effort: Pulmonary effort is normal.      Breath sounds: Normal breath sounds.   Abdominal:      General: Bowel sounds are normal. There is no distension.      Tenderness: There is no abdominal tenderness.   Musculoskeletal:      Right lower leg: Edema present.      Left lower leg: Edema present.      Comments: 1+ bilateral LE edema   Skin:     General: Skin is warm and dry.   Neurological:      Mental Status: She is alert and oriented to person, place, and time. Mental status is at baseline.   Psychiatric:         Mood and Affect: Mood normal.         Behavior: Behavior normal.       Significant Labs: All pertinent labs within the past 24 hours have been reviewed.  Recent Lab Results         11/25/22  0255   11/24/22  2129   11/24/22  1620   11/24/22  1214        Albumin 2.2             Anion Gap 14             Baso # 0.00             Basophil % 0.0             BUN 91             Calcium 7.5             Chloride 100             CO2 22             Creatinine 8.9             Differential Method Automated             eGFR 4.3             Eos # 0.0             Eosinophil % 0.0             Glucose 72             Gran # (ANC) 5.6             Gran % 72.0             Hematocrit 20.2             Hemoglobin 6.5             Immature Grans (Abs) 0.11  Comment: Mild elevation in immature granulocytes is non specific and   can be seen in a variety of conditions including stress response,   acute inflammation, trauma and pregnancy. Correlation with other   laboratory and clinical findings is essential.                Immature Granulocytes 1.4             Lymph # 1.4             Lymph % 17.9             Magnesium 1.9             MCH 29.4             MCHC 32.2             MCV 91             Mono # 0.7             Mono % 8.7             MPV 12.0             nRBC 0             Phosphorus 6.7              6.7             Platelets 102             POCT Glucose   132   164   135       Potassium 4.3             RBC 2.21             RDW 14.6             Sodium 136             WBC 7.78                     Significant Imaging: I have reviewed all pertinent imaging results/findings within the past 24 hours.      Assessment/Plan:      * Microscopic polyangiitis  As of 10/26/22 strongly positive MPO Ab (in contrast to borderline positive PR3), consistent with clinical picture of microscopic polyangiitis with pulmonary, and renal involvement after presenting with acute hypoxemic respiratory failure, hemoptysis, and acute renal failure.  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   3)  CHANGES SUGGESTIVE OF FOCAL ACUTE PYELONEPHRITIS.   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 Now following PEXIVAS steroid taper. Currently  on IV Solumedrol 20 mg daily.   - plan for 20mg IV daily on 11/6 for 14 days (last dose of 20mg equivalent is 11/20/2022).   - apheresis: underwent PLEX 10/26, 10/27, 10/30, 11/1, 11/2, 11/3  - rituximab every 7 days for 4 doses: Dose #1: 10/27, #2 11/3, #3 11/10, and #4 given 11/17  - cyclophosphamide every 14 days for 2 doses: 10/27, 11/10  - Opportunistic Infection ppx: atovaquone 1500mg PO daily  - GI bleed prophylaxis: pantoprazole 40mg daily   11/17 Rheumatology following for steroid dosing and chemotherapeutic agents. on IV steroids due to p.o. intake issues, however can transition to p.o. when swallowing issues reliably resolved - on steroid taper - solumedrol .  rituximab dose due November 17 11/18  switched to prednisone taper by nephrology  11/24: No changes. Having good UOP but Cr still greater than 8. Main concern at this time is whether patient will need to go on dialysis or not. She did get one session two days ago. UOP is improving which is of course encouraging. Has triaylisis line still in place if needed. Nephro following  11/25: No changes, still following BMP and UOP to determine ultimate need for HD. Renal following      Primary pauci-immune necrotizing and crescentic glomerulonephritis  Patient with acute kidney injury likely due to microscopic polyangiitis WILFREDO is currently stable. Labs reviewed- Renal function/electrolytes with Estimated Creatinine Clearance: 4.5 mL/min (A) (based on SCr of 8.9 mg/dL (H)). according to latest data. Monitor urine output and serial BMP and adjust therapy as needed. Avoid nephrotoxins and renally dose meds for GFR listed above.       Gastric reflux  PPI BID  Elevated HOB; feeds changed to bolus and tolerating well  Symptoms initially improved however reoccurrence on 11/13 without significant dietary changes; GI planning for EGD 11/14  TF further adjusted for smaller, more frequent bolus   s/p EGD 11/14; Normal Study      High risk medications (not  anticoagulants) long-term use  as above      Anuria    11/17 Nephrology following for HD and  monitoring for renal recovery with some  improvement in UOP in last 2 days. will need PermCath and HD chair set up if no renal  recovery.     11/24: Still following to determine need for HD although anuria has resolved    Gastrointestinal hemorrhage with melena  Starting having hemoptysis and melena with associated acute blood loss anemia earlier in hospital stay. Patient with known internal hemorrhoids, mild sigmoid diverticulosis, history of gastritis and + H pylori (2012 EGD).   - GI consulted 10/19, unable to perform EGD due to instability and respiratory failure at the time    Plan  - patient unable to take PO PPI due to NGT removal on 11/5, transition to IV PPI 40mg pantoprazole daily, return to per NG tube once replaced  - s/p EGD 11/14; Normal Study  - PPI transitioned to BID as concern for GERD  - Monitor CBC closely for signs of recurrent bleed  11/17 Hb trended dopwn to 6.6. Transfusion with 1 unit of PRBC .  111/8  continue protonix oral     11/25: Hgb trending down today and will need 1 u PRBC. Denies further melena. Will check iron panel, B12, folate, retic count in AM, FOBT, and consider reconsult to GI for consideration of colonoscopy. Also could be contribution from renal disease. HDS    Dysphagia  SLP following  MBSS showing global weakness in swallowing as well as aspiration with thin liquids.   ENT consulted but patient did not tolerate laryngoscopy     Plan   - Discussed case with Rheumatology team, they are concerned that this dysphagia may have been from prior stroke, requesting imaging for evaluation-  CT demonstrated no acute findings or causes for dysphagia NOR did MRI   - removed NGT and place dobhoff which is more comfortable and makes swallowing easier compared to NGT.    - continue working with speech therapy   -exam concerning for thrush; nystatin swish and swallow initiated; GI consulted as  concern that oropharyngeal candidiasis may be contributing to dysphagia  -Per GI, Lower suspicion for esophageal candidiasis without odynophagia however can If white plaques have been seen on oropharynx, ok to start fluconazole empirically for possible esophageal candidiasis  -EGD on 11/14 without abnormality; per GI, Suspect some aspect of esophageal dysmotility in setting of critical illness. No further testing at this time.  -diet initiated per SLP on 11/16; NG tube removed  -continue to monitor p.o. intake closely  11/17 SLP recs Mechanical soft, Liquid Diet Level: Thin   11/22 advanced to renal diet     Moderate malnutrition  Nutrition consulted. Most recent weight and BMI monitored-          Malnutrition (Moderate to Severe)  Weight Loss (Malnutrition): 7.5% in 3 months              Measurements:  Wt Readings from Last 1 Encounters:   11/22/22 57.6 kg (126 lb 15.8 oz)   Body mass index is 23.99 kg/m².    Recommendations: Recommendation/Intervention: 1. Per SLP, remove Dysphagia soft diet restrictions. Continue Renal diet & encourage PO intake as tolerated. 2. If pt agreeable, add Novasource ONS. 3. RD to monitor & follow-up.  Goals: Meet % EEN, EPN by RD f/u date    Patient has been screened and assessed by RD. RD will follow patient.      Encounter for continuous renal replacement therapy (CRRT) for acute renal failure  Due to microscopic polyangiitis. Remains on hemodialysis intermittently.   - Baseline creatinine 1.0-1.2.   - New onset proteinuria/hematuria.   - Renal biopsy completed and   - Trialysis in place for intermittent Hemodialysis    Plan  - Nephrology consulted, appreciate management  - management of MPA as noted separately  - Renal function panel daily  - renally dose all medications  - intermittent Hemodialysis as indicated, per Nephrology   11/17     Recent Labs   Lab 11/23/22  0507 11/24/22  0500 11/25/22  0255   BUN 82* 84* 91*   CREATININE 8.3* 8.7* 8.9*     . Nephrology following for  HD and  monitoring for renal recovery with some  improvement in UOP in last 2 days. will need PermCath and HD chair set up if no renal  recovery.  likely SNF pending nephro decision regarding HD   11/20  mL /24h. BUN/CR trending up to 113/9.8 , started on sodium bicarb for bicarb 19. continue lokelma 5g daily for 5.1   11/23 HD yesterday without ultrafiltration . UOP 500ml/24h   11/24: UOP 1100 cc in 24hr  11/25: So far today urinated 400cc by about 11am    Acute hypoxemic respiratory failure  Multifocal pneumonia  Hemoptysis  Dyspnea  Diffuse Alveolar Hemorrahge  In the setting of a new diagnosis of microscopic polyangiitis, presented with fevers, dyspnea,.  - Chest imaging was consistent with diffuse alveolar hemorrhage.  CT chest wc 10/17 with bl perihilar dense consolidations w/ peripheral patcy areas of GGO, BL PNA, less c/f cardiogenic pulmonary edema.  - Patient upgraded to Medical ICU 10/23/22 with abrupt respiratory decline requiring NIPPV, improved with high dose IV steroids, plasmapheresis, emergent hemodialysis.   - Unable to perform bronchoscopy in the Medical ICU due to tenuous respiratory status  - As of 11/6, hypoxemia has significantly improved    Plan:  - weaned to room air  - continue management of underlying triggers with steroid and immunosuppressants  - incentive spirometry and respiratory hygiene  11/17 sats 92% on RA  11/25: Has been stable on RA for extended period at this time    Lymphadenopathy of head and neck  - Has bilateral neck gland swelling with tenderness,consulted ENT  - No surgical intervention indicated   - Adenopathy likely reactive in nature   - Recommend further workup if it does not resolve within next 2 weeks     Hyponatremia  Has hyponatremia,started on NS. was given 2 grams of sodium chloride tablets TID for likely SIADH  11/18  sodium normalized at 137. salt tablets discontinued.      Acute blood loss anemia  In the setting of GI bleed with melena  - Evaluated by  GI, see GI bleed documentation  - Last transfusion 10/28, Hgb slowly trending down since then  - Hgb 6.9 on 11/5      Plan  - Monitor CBC Daily   - Treat with pRBC transfusion PRN Hgb <7  - qualifies for transfusion on 11/5 with Hgb 6.9, 1u pRBC ordered  -stable   11/17 Hb trended dopwn to 6.6. Transfusion with 1 unit of PRBC .consider GI eval  11/24: Stable Hgb and vitals  11/25: Hgb trending down today and will need 1 u PRBC. Denies further melena. Will check iron panel, B12, folate, retic count in AM, FOBT, and consider reconsult to GI for consideration of colonoscopy. Also could be contribution from renal disease. HDS    Current CBC reviewed-    Recent Labs   Lab 11/23/22  0507 11/23/22  1518 11/25/22  0255   HGB 7.1* 7.6* 6.5*       Chronic diastolic heart failure  Pulmonary Hypertension due to left heart disease  TTE (10/2022) EF 65%, G1DD  Home meds: Lisinopril, Toprol        Plan  - Active volume control with intermittent Hemodialysis per Nephrology   - Daily weights (standing if tolerated)  - Strict I/Os  - Fluid restriction 1.5 day  - Cardiac diet w/ 2 g Na restriction    11/25: Does have LE edema as mentioned but more likely secondary to renal failure than heart failure and will continue current management. On room air breathing comfortably      Hyperkalemia    resolved    Primary hypertension  BP Readings from Last 1 Encounters:   11/25/22 (!) 104/54     - Patient is unable to tolerate PO mediations, all meds to be administered via NG tube  -Will continue to monitor blood pressure trend closely and adjust antihypertensive regimen as clinically indicated and tolerated.    amLODIPine tablet 10 mg, 10 mg, Per NG tube, Daily    carvediloL tablet 12.5 mg, 12.5 mg, Per NG tube, BID    hydrALAZINE tablet 25 mg, 25 mg, Per NG tube, Q8H    lisinopriL tablet 40 mg, 40 mg, Per NG tube, Daily    11/24: NG tube has been removed and now taking medications by mouth      Hypothyroid  - Continue synthroid 25 mcg  -  TSH 0.585 10/27/22      VTE Risk Mitigation (From admission, onward)         Ordered     heparin (porcine) injection 1,000 Units  As needed (PRN)         11/22/22 0832     heparin, porcine (PF) 100 unit/mL injection flush 500 Units  As needed (PRN)         11/17/22 1343     heparin, porcine (PF) 100 unit/mL injection flush 500 Units  As needed (PRN)         11/10/22 1430     heparin (porcine) injection 1,000 Units  As needed (PRN)         11/09/22 1601     heparin (porcine) injection 1,000 Units  As needed (PRN)         11/08/22 0854     Place sequential compression device  Until discontinued         10/25/22 1731     IP VTE HIGH RISK PATIENT  Once         10/17/22 1354     Reason for No Pharmacological VTE Prophylaxis  Once        Question:  Reasons:  Answer:  Risk of Bleeding    10/17/22 1354                Discharge Planning   ANTHONY: 11/29/2022     Code Status: Full Code   Is the patient medically ready for discharge?: No    Reason for patient still in hospital (select all that apply): Treatment  Discharge Plan A: Skilled Nursing Facility   Discharge Delays: None known at this time              Ej Fregoso MD  Department of Hospital Medicine   [facility] - Intensive Care ([place]-14)

## 2022-11-25 NOTE — ASSESSMENT & PLAN NOTE
Nutrition consulted. Most recent weight and BMI monitored-          Malnutrition (Moderate to Severe)  Weight Loss (Malnutrition): 7.5% in 3 months              Measurements:  Wt Readings from Last 1 Encounters:   11/22/22 57.6 kg (126 lb 15.8 oz)   Body mass index is 23.99 kg/m².    Recommendations: Recommendation/Intervention: 1. Per SLP, remove Dysphagia soft diet restrictions. Continue Renal diet & encourage PO intake as tolerated. 2. If pt agreeable, add Novasource ONS. 3. RD to monitor & follow-up.  Goals: Meet % EEN, EPN by RD f/u date    Patient has been screened and assessed by RD. RD will follow patient.

## 2022-11-25 NOTE — ASSESSMENT & PLAN NOTE
Multifocal pneumonia  Hemoptysis  Dyspnea  Diffuse Alveolar Hemorrahge  In the setting of a new diagnosis of microscopic polyangiitis, presented with fevers, dyspnea,.  - Chest imaging was consistent with diffuse alveolar hemorrhage.  CT chest wc 10/17 with bl perihilar dense consolidations w/ peripheral patcy areas of GGO, BL PNA, less c/f cardiogenic pulmonary edema.  - Patient upgraded to Medical ICU 10/23/22 with abrupt respiratory decline requiring NIPPV, improved with high dose IV steroids, plasmapheresis, emergent hemodialysis.   - Unable to perform bronchoscopy in the Medical ICU due to tenuous respiratory status  - As of 11/6, hypoxemia has significantly improved    Plan:  - weaned to room air  - continue management of underlying triggers with steroid and immunosuppressants  - incentive spirometry and respiratory hygiene  11/17 sats 92% on RA  11/25: Has been stable on RA for extended period at this time

## 2022-11-25 NOTE — SUBJECTIVE & OBJECTIVE
Interval History: see above    Review of Systems   Constitutional:  Negative for fatigue and fever.   HENT: Negative.     Respiratory:  Negative for chest tightness and shortness of breath.    Cardiovascular:  Negative for chest pain and leg swelling.   Gastrointestinal:  Positive for nausea. Negative for abdominal pain, constipation and diarrhea.   Genitourinary:  Negative for difficulty urinating and dysuria.        So far today urinated one time, 400cc   Musculoskeletal: Negative.    Skin:  Negative for rash.   Neurological:  Negative for light-headedness and headaches.   Hematological:  Does not bruise/bleed easily.   Psychiatric/Behavioral:  Negative for agitation and behavioral problems.    Objective:     Vital Signs (Most Recent):  Temp: 96.5 °F (35.8 °C) (11/25/22 0817)  Pulse: 66 (11/25/22 0817)  Resp: 18 (11/25/22 0419)  BP: (!) 104/54 (11/25/22 0817)  SpO2: 98 % (11/25/22 0817)   Vital Signs (24h Range):  Temp:  [96.5 °F (35.8 °C)-98.7 °F (37.1 °C)] 96.5 °F (35.8 °C)  Pulse:  [62-66] 66  Resp:  [17-20] 18  SpO2:  [98 %-100 %] 98 %  BP: (104-117)/(53-58) 104/54     Weight: 57.6 kg (126 lb 15.8 oz)  Body mass index is 23.99 kg/m².  No intake or output data in the 24 hours ending 11/25/22 1058     Physical Exam  Constitutional:       General: She is not in acute distress.  HENT:      Head: Normocephalic and atraumatic.      Mouth/Throat:      Mouth: Mucous membranes are moist.      Pharynx: Oropharynx is clear.   Eyes:      General: No scleral icterus.  Cardiovascular:      Rate and Rhythm: Normal rate and regular rhythm.      Heart sounds: No murmur heard.    No gallop.   Pulmonary:      Effort: Pulmonary effort is normal.      Breath sounds: Normal breath sounds.   Abdominal:      General: Bowel sounds are normal. There is no distension.      Tenderness: There is no abdominal tenderness.   Musculoskeletal:      Right lower leg: Edema present.      Left lower leg: Edema present.      Comments: 1+ bilateral  LE edema   Skin:     General: Skin is warm and dry.   Neurological:      Mental Status: She is alert and oriented to person, place, and time. Mental status is at baseline.   Psychiatric:         Mood and Affect: Mood normal.         Behavior: Behavior normal.       Significant Labs: All pertinent labs within the past 24 hours have been reviewed.  Recent Lab Results         11/25/22  0255   11/24/22  2129   11/24/22  1620   11/24/22  1214        Albumin 2.2             Anion Gap 14             Baso # 0.00             Basophil % 0.0             BUN 91             Calcium 7.5             Chloride 100             CO2 22             Creatinine 8.9             Differential Method Automated             eGFR 4.3             Eos # 0.0             Eosinophil % 0.0             Glucose 72             Gran # (ANC) 5.6             Gran % 72.0             Hematocrit 20.2             Hemoglobin 6.5             Immature Grans (Abs) 0.11  Comment: Mild elevation in immature granulocytes is non specific and   can be seen in a variety of conditions including stress response,   acute inflammation, trauma and pregnancy. Correlation with other   laboratory and clinical findings is essential.               Immature Granulocytes 1.4             Lymph # 1.4             Lymph % 17.9             Magnesium 1.9             MCH 29.4             MCHC 32.2             MCV 91             Mono # 0.7             Mono % 8.7             MPV 12.0             nRBC 0             Phosphorus 6.7              6.7             Platelets 102             POCT Glucose   132   164   135       Potassium 4.3             RBC 2.21             RDW 14.6             Sodium 136             WBC 7.78                     Significant Imaging: I have reviewed all pertinent imaging results/findings within the past 24 hours.

## 2022-11-25 NOTE — ASSESSMENT & PLAN NOTE
Patient with acute kidney injury likely due to microscopic polyangiitis WILFREDO is currently stable. Labs reviewed- Renal function/electrolytes with Estimated Creatinine Clearance: 4.5 mL/min (A) (based on SCr of 8.9 mg/dL (H)). according to latest data. Monitor urine output and serial BMP and adjust therapy as needed. Avoid nephrotoxins and renally dose meds for GFR listed above.

## 2022-11-25 NOTE — ASSESSMENT & PLAN NOTE
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  PLEX 10/26, 10/27, 10/29, 10/30, 11/1, 11/2, 11/3 (prior to Rituxan)  Rituximab 375 mg/m^2 (600 mg) on 10/27 11/3, 11/10 and 11/17 (completed 4 doses)  Cyclophosphamide 7.5 mg/kg on 10/27 and 11/10 ( every 14 days ); has completed 2 doses   Serum BUN/Cr slowly trending up; will continue monitoring daily for dialysis needs   Now following PEXIVAS steroid taper; currently on Prednisone 20mg PO daily   Continue Atovaquone for prophylaxis  Strict I/Os and chart   Avoid nephrotoxic agents as much as possible

## 2022-11-26 PROBLEM — D64.89 OTHER SPECIFIED ANEMIAS: Status: ACTIVE | Noted: 2022-10-19

## 2022-11-26 LAB
ABO + RH BLD: NORMAL
ALBUMIN SERPL BCP-MCNC: 2.5 G/DL (ref 3.5–5.2)
ANION GAP SERPL CALC-SCNC: 13 MMOL/L (ref 8–16)
ANION GAP SERPL CALC-SCNC: 14 MMOL/L (ref 8–16)
BASOPHILS # BLD AUTO: 0.02 K/UL (ref 0–0.2)
BASOPHILS NFR BLD: 0.2 % (ref 0–1.9)
BLD GP AB SCN CELLS X3 SERPL QL: NORMAL
BUN SERPL-MCNC: 90 MG/DL (ref 8–23)
BUN SERPL-MCNC: 94 MG/DL (ref 8–23)
CALCIUM SERPL-MCNC: 7.4 MG/DL (ref 8.7–10.5)
CALCIUM SERPL-MCNC: 7.6 MG/DL (ref 8.7–10.5)
CHLORIDE SERPL-SCNC: 98 MMOL/L (ref 95–110)
CHLORIDE SERPL-SCNC: 99 MMOL/L (ref 95–110)
CO2 SERPL-SCNC: 22 MMOL/L (ref 23–29)
CO2 SERPL-SCNC: 24 MMOL/L (ref 23–29)
CREAT SERPL-MCNC: 10 MG/DL (ref 0.5–1.4)
CREAT SERPL-MCNC: 9.8 MG/DL (ref 0.5–1.4)
DIFFERENTIAL METHOD: ABNORMAL
EOSINOPHIL # BLD AUTO: 0 K/UL (ref 0–0.5)
EOSINOPHIL NFR BLD: 0 % (ref 0–8)
ERYTHROCYTE [DISTWIDTH] IN BLOOD BY AUTOMATED COUNT: 14.7 % (ref 11.5–14.5)
EST. GFR  (NO RACE VARIABLE): 3.7 ML/MIN/1.73 M^2
EST. GFR  (NO RACE VARIABLE): 3.8 ML/MIN/1.73 M^2
FOLATE SERPL-MCNC: 9.1 NG/ML (ref 4–24)
GLUCOSE SERPL-MCNC: 188 MG/DL (ref 70–110)
GLUCOSE SERPL-MCNC: 85 MG/DL (ref 70–110)
HCT VFR BLD AUTO: 26 % (ref 37–48.5)
HGB BLD-MCNC: 8.6 G/DL (ref 12–16)
IMM GRANULOCYTES # BLD AUTO: 0.22 K/UL (ref 0–0.04)
IMM GRANULOCYTES NFR BLD AUTO: 2.2 % (ref 0–0.5)
LYMPHOCYTES # BLD AUTO: 1.5 K/UL (ref 1–4.8)
LYMPHOCYTES NFR BLD: 14.6 % (ref 18–48)
MAGNESIUM SERPL-MCNC: 1.9 MG/DL (ref 1.6–2.6)
MCH RBC QN AUTO: 29.1 PG (ref 27–31)
MCHC RBC AUTO-ENTMCNC: 33.1 G/DL (ref 32–36)
MCV RBC AUTO: 88 FL (ref 82–98)
MONOCYTES # BLD AUTO: 0.6 K/UL (ref 0.3–1)
MONOCYTES NFR BLD: 6 % (ref 4–15)
NEUTROPHILS # BLD AUTO: 7.8 K/UL (ref 1.8–7.7)
NEUTROPHILS NFR BLD: 77 % (ref 38–73)
NRBC BLD-RTO: 0 /100 WBC
PHOSPHATE SERPL-MCNC: 6.3 MG/DL (ref 2.7–4.5)
PHOSPHATE SERPL-MCNC: 6.7 MG/DL (ref 2.7–4.5)
PLATELET # BLD AUTO: 100 K/UL (ref 150–450)
PMV BLD AUTO: 11.9 FL (ref 9.2–12.9)
POCT GLUCOSE: 112 MG/DL (ref 70–110)
POCT GLUCOSE: 118 MG/DL (ref 70–110)
POCT GLUCOSE: 190 MG/DL (ref 70–110)
POTASSIUM SERPL-SCNC: 4.1 MMOL/L (ref 3.5–5.1)
POTASSIUM SERPL-SCNC: 4.2 MMOL/L (ref 3.5–5.1)
RBC # BLD AUTO: 2.96 M/UL (ref 4–5.4)
RETICS/RBC NFR AUTO: 1.2 % (ref 0.5–2.5)
SODIUM SERPL-SCNC: 134 MMOL/L (ref 136–145)
SODIUM SERPL-SCNC: 136 MMOL/L (ref 136–145)
VIT B12 SERPL-MCNC: 1207 PG/ML (ref 210–950)
WBC # BLD AUTO: 10.08 K/UL (ref 3.9–12.7)

## 2022-11-26 PROCEDURE — 86850 RBC ANTIBODY SCREEN: CPT | Performed by: STUDENT IN AN ORGANIZED HEALTH CARE EDUCATION/TRAINING PROGRAM

## 2022-11-26 PROCEDURE — 36415 COLL VENOUS BLD VENIPUNCTURE: CPT | Performed by: STUDENT IN AN ORGANIZED HEALTH CARE EDUCATION/TRAINING PROGRAM

## 2022-11-26 PROCEDURE — 82746 ASSAY OF FOLIC ACID SERUM: CPT | Performed by: STUDENT IN AN ORGANIZED HEALTH CARE EDUCATION/TRAINING PROGRAM

## 2022-11-26 PROCEDURE — 63600175 PHARM REV CODE 636 W HCPCS: Performed by: STUDENT IN AN ORGANIZED HEALTH CARE EDUCATION/TRAINING PROGRAM

## 2022-11-26 PROCEDURE — 84100 ASSAY OF PHOSPHORUS: CPT

## 2022-11-26 PROCEDURE — 85025 COMPLETE CBC W/AUTO DIFF WBC: CPT | Performed by: STUDENT IN AN ORGANIZED HEALTH CARE EDUCATION/TRAINING PROGRAM

## 2022-11-26 PROCEDURE — 20600001 HC STEP DOWN PRIVATE ROOM

## 2022-11-26 PROCEDURE — 99232 SBSQ HOSP IP/OBS MODERATE 35: CPT | Mod: ,,, | Performed by: STUDENT IN AN ORGANIZED HEALTH CARE EDUCATION/TRAINING PROGRAM

## 2022-11-26 PROCEDURE — 25000003 PHARM REV CODE 250

## 2022-11-26 PROCEDURE — 94761 N-INVAS EAR/PLS OXIMETRY MLT: CPT

## 2022-11-26 PROCEDURE — 80048 BASIC METABOLIC PNL TOTAL CA: CPT | Performed by: STUDENT IN AN ORGANIZED HEALTH CARE EDUCATION/TRAINING PROGRAM

## 2022-11-26 PROCEDURE — 25000003 PHARM REV CODE 250: Performed by: HOSPITALIST

## 2022-11-26 PROCEDURE — 85045 AUTOMATED RETICULOCYTE COUNT: CPT | Performed by: STUDENT IN AN ORGANIZED HEALTH CARE EDUCATION/TRAINING PROGRAM

## 2022-11-26 PROCEDURE — 80069 RENAL FUNCTION PANEL: CPT | Performed by: STUDENT IN AN ORGANIZED HEALTH CARE EDUCATION/TRAINING PROGRAM

## 2022-11-26 PROCEDURE — 82607 VITAMIN B-12: CPT | Performed by: STUDENT IN AN ORGANIZED HEALTH CARE EDUCATION/TRAINING PROGRAM

## 2022-11-26 PROCEDURE — 25000003 PHARM REV CODE 250: Performed by: STUDENT IN AN ORGANIZED HEALTH CARE EDUCATION/TRAINING PROGRAM

## 2022-11-26 PROCEDURE — 97116 GAIT TRAINING THERAPY: CPT

## 2022-11-26 PROCEDURE — 99232 PR SUBSEQUENT HOSPITAL CARE,LEVL II: ICD-10-PCS | Mod: ,,, | Performed by: STUDENT IN AN ORGANIZED HEALTH CARE EDUCATION/TRAINING PROGRAM

## 2022-11-26 PROCEDURE — 83735 ASSAY OF MAGNESIUM: CPT

## 2022-11-26 RX ORDER — METOCLOPRAMIDE HYDROCHLORIDE 5 MG/ML
5 INJECTION INTRAMUSCULAR; INTRAVENOUS EVERY 6 HOURS PRN
Status: DISCONTINUED | OUTPATIENT
Start: 2022-11-26 | End: 2022-12-13 | Stop reason: HOSPADM

## 2022-11-26 RX ADMIN — NYSTATIN 500000 UNITS: 500000 SUSPENSION ORAL at 09:11

## 2022-11-26 RX ADMIN — QUETIAPINE FUMARATE 25 MG: 25 TABLET ORAL at 09:11

## 2022-11-26 RX ADMIN — CARVEDILOL 25 MG: 25 TABLET, FILM COATED ORAL at 09:11

## 2022-11-26 RX ADMIN — HYDRALAZINE HYDROCHLORIDE 100 MG: 50 TABLET ORAL at 03:11

## 2022-11-26 RX ADMIN — HYDRALAZINE HYDROCHLORIDE 100 MG: 50 TABLET ORAL at 06:11

## 2022-11-26 RX ADMIN — PANTOPRAZOLE SODIUM 40 MG: 40 TABLET, DELAYED RELEASE ORAL at 03:11

## 2022-11-26 RX ADMIN — LEVOTHYROXINE SODIUM 25 MCG: 25 TABLET ORAL at 06:11

## 2022-11-26 RX ADMIN — NYSTATIN 500000 UNITS: 500000 SUSPENSION ORAL at 05:11

## 2022-11-26 RX ADMIN — AMLODIPINE BESYLATE 10 MG: 10 TABLET ORAL at 09:11

## 2022-11-26 RX ADMIN — PANTOPRAZOLE SODIUM 40 MG: 40 TABLET, DELAYED RELEASE ORAL at 06:11

## 2022-11-26 RX ADMIN — SODIUM BICARBONATE 650 MG TABLET 650 MG: at 09:11

## 2022-11-26 RX ADMIN — PREDNISONE 20 MG: 20 TABLET ORAL at 09:11

## 2022-11-26 RX ADMIN — HYDRALAZINE HYDROCHLORIDE 100 MG: 50 TABLET ORAL at 09:11

## 2022-11-26 RX ADMIN — ATOVAQUONE 1500 MG: 750 SUSPENSION ORAL at 09:11

## 2022-11-26 RX ADMIN — NYSTATIN 500000 UNITS: 500000 SUSPENSION ORAL at 12:11

## 2022-11-26 NOTE — ASSESSMENT & PLAN NOTE
Patient with acute kidney injury likely due to microscopic polyangiitis WILFREDO is currently stable. Labs reviewed- Renal function/electrolytes with Estimated Creatinine Clearance: 4 mL/min (A) (based on SCr of 10 mg/dL (H)). according to latest data. Monitor urine output and serial BMP and adjust therapy as needed. Avoid nephrotoxins and renally dose meds for GFR listed above.

## 2022-11-26 NOTE — ASSESSMENT & PLAN NOTE
BP Readings from Last 1 Encounters:   11/26/22 (!) 117/56     - Patient is unable to tolerate PO mediations, all meds to be administered via NG tube  -Will continue to monitor blood pressure trend closely and adjust antihypertensive regimen as clinically indicated and tolerated.    amLODIPine tablet 10 mg, 10 mg, Per NG tube, Daily    carvediloL tablet 12.5 mg, 12.5 mg, Per NG tube, BID    hydrALAZINE tablet 25 mg, 25 mg, Per NG tube, Q8H    lisinopriL tablet 40 mg, 40 mg, Per NG tube, Daily    11/24: NG tube has been removed and now taking medications by mouth  11/26: /56, continue current management

## 2022-11-26 NOTE — PROGRESS NOTES
[facility] - [facility]ensive [facility] ([address] Medicine  Progress Note    Patient Name: Kristin Goodman  MRN: [id number]  Patient Class: IP- Inpatient   Admission Date: 10/17/2022  Length of Stay: 40 days  Attending Physician: Ej Fregoso MD  Primary Care Provider: Lori Hernandez MD        Subjective:     Principal Problem:Microscopic polyangiitis        HPI:  Kristin Goodman is a 77 y.o. female with a past medical history of HTN, hypothyroidism, HFpEF, and osteoarthritis of the arm who has presented to the ED for cough, SOB, and weakness. Daughter is present at the bedside. Patient presented to the ED on 9/24 with hemoptysis, CT and CXR showed high suspicion for multilobar pneumonia. Patient was discharged with a 10-day course of levofloxacin and an albuterol inhaler. Patient followed up with her PCP on 10/4 with slight improvement in symptoms and went back to work. Patient continued to have worsening symptoms and presented to the ED again on 10/14 for evaluation and no interventions were done. Patient endorses fevers over the last few days up to 102.4 F with progressive SOB. She endorses hemoptysis with moderate amount of blood, generalized weakness, productive cough, SOB, and loss of appetite. Denies chest pain, nausea, vomiting, abdominal pain, or urinary changes.    ED: hypertensive up to 219/93 and tachycardic up to 117. Oxygen saturation on 92% on RA, placed on 5L NC with sats >97%. CBC remarkable for leukocytosis of 16.03 and Hb 7.7. K 3.3. Cr 1.6, baseline ~1.0. . Troponin 0.061. COVID and flu negative. EKG NSR. CT chest with contrast pending at time of admission. Given home amlodipine and lisinopril. Given 500mL NS, IV azithromycin, cefepime and vanc.       Overview/Hospital Course:  Ms. Goodman was admitted to Hospital Medicine for management of multifocal pneumonia and acute hypoxemic respiratory failure after presenting to the ED with fevers, cough, hemoptysis, and dyspnea. She required  escalation to the Medical ICU due to abrupt decline in her respiratory status, associated with hemoptysis and requiring NIPPV. CT chest showed bilateral patchy ground glass opacities. Labs additionally were notable for acute renal failure on CKD3. Pulmonology was consulted while under the care of [facility] and felt this picture was consistent with diffuse alveolar hemorrhage.     Her workup revealed a strongly positive MPO Ab consistent with clinical picture of microscopic polyangiitis with pulmonary and renal involvement. She underwent Trialysis catheter placement 10/25/2022 in the Medical ICU. 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     Rheumatology was consulted, extensive workup revealed MITUL + 1:2560 homogenous, +dsDNA, normal complements, PR3 1.2 (slight +),  (+), cANCA + 1:80, pANCA neg, GBMAb neg, trace cryos. Due to concern for MPA, she was started on pulse dose IV methylprednisolone 1000mg for 3 days, and plasmapheresis under the guidance of Transfusion Medicine. She remains on a steroid taper and has completed PLEX. She additionally received rituximab and cyclophosphamide. OI prophylaxis was provided with atovaquone due to a sulfa allergy.     Nephrology was consulted for evaluation of acute renal failure in the setting of MPA. She did require SLED in the ICU for renal clearance and remains on intermittent hemodialysis. She is expected to continue HD once discharged.     Her acute hypoxemic respiratory failure improved and she was weaned off of supplemental oxygen. She stepped down to [facility] 10/28.     She underwent MBBS for evaluation of dysphagia, which showed global delayed initiation of swallow and aspiration with thin liquids. Due to  concern for possible prior stroke, head imaging was completed and negative for acute finding. She is NPO with NG tube. ENT was consulted for evaluation of dysphagia and cervical adenopathy.  Patient did not tolerate laryngoscopy; unable to visualize vocal cords but given her lack of hoarseness, they did not suspect vocal fold paresis/paralysis. MRI recommended by rheum to fully rule out any potential neurological cause of the patient's dysphagia; but demonstrated   remote left thalamic lacunar type infarct and punctate remote left cerebellar infarcts.  She is continuing to work with speech therapy.  EGD completed 11/14 without abnormalities.  Per GI, Suspect some aspect of esophageal dysmotility in setting of critical illness. No further testing at this time.  Per SLP, diet advanced on 11/15 and NG tube removed.    Her other chronic medical conditions including hypothyroidism, diastolic CHF, and hypertension were managed with her home medications, with dose adjustments as needed.     11/17 Hb trended dopwn to 6.6. Transfusion with 1 unit of PRBC .  Rheumatology following for steroid dosing and chemotherapeutic agents. on IV steroids due to p.o. intake issues, however can transition to p.o. when swallowing issues reliably resolved - on steroid taper - solumedrol   rituximab dose due November 17; has been . Nephrology following for HD and  monitoring for renal recovery with some  improvement in UOP in last 2 days. will need PermCath and HD chair set up if no renal  recovery.  likely SNF pending nephro decision regarding HD . No need for HD today. Continue 2 grams of sodium chloride tablets TID for likely SIADH. SLP recs Mechanical soft, Liquid Diet Level: Thin   11/18  sodium normalized at 137. salt tablets discontinued. Hb at 8.5 s/p 1 U PRBC. UOP 400mL /24h.  K at 5 . switched to prednisone taper by nephrology .started lokelma 5 mg x 3days  11/19  mL /24h. BUN/CR trending up to 102/9.3 ,no HD for now per  nephro  11/20  mL /24h. BUN/CR trending up to 113/9.8 , started on sodium bicarb for bicarb 19. continue lokelma 5g daily for 5.1 .   11/21 nephrology follow up  -No urgent indication for HD today as with no signs of uremic encephalopathy or  O2 requirements  11/22 HD x1 today for uremia   11/23 Hb 7.1 . HD yesterday without ultrafiltration . UOp 500ml/24h   11/24: Hgb up to 7.6 today. Patient looks and feels well. Having good UOP of >1L In last 24 hours. Cr up to 8.7 with BUN 84 today.  11/25: Hgb down to 6.5 today, will transfuse one unit and work up for anemia (did have melena recently although normal EGD). Vitals stable. Does have nausea today giving zofran and metoclopramide. 400cc UOP so far today. Cr trending up slightly  11/26: Hgb up to 8.6 after transfusion. UOP 400cc so far this shift (although not sure if all were charted). Cr still uptrending unfortunately to 10.0 from 8.9 but BUN is 90 from 91        Interval History: see above    Review of Systems   Constitutional:  Negative for fatigue and fever.   HENT: Negative.     Respiratory:  Negative for chest tightness and shortness of breath.    Cardiovascular:  Negative for chest pain and leg swelling.   Gastrointestinal:  Positive for nausea. Negative for abdominal pain, constipation and diarrhea.   Genitourinary:  Negative for difficulty urinating and dysuria.        Reports increased UOP today   Musculoskeletal: Negative.    Skin:  Negative for rash.   Neurological:  Negative for light-headedness and headaches.   Hematological:  Does not bruise/bleed easily.   Psychiatric/Behavioral:  Negative for agitation and behavioral problems.    Objective:     Vital Signs (Most Recent):  Temp: 98.7 °F (37.1 °C) (11/26/22 1054)  Pulse: 70 (11/26/22 1054)  Resp: 18 (11/26/22 1054)  BP: (!) 117/56 (11/26/22 1054)  SpO2: 95 % (11/26/22 1054)   Vital Signs (24h Range):  Temp:  [96.1 °F (35.6 °C)-98.7 °F (37.1 °C)] 98.7 °F (37.1 °C)  Pulse:  [62-73] 70  Resp:   [16-19] 18  SpO2:  [90 %-96 %] 95 %  BP: ()/(53-61) 117/56     Weight: 57.6 kg (126 lb 15.8 oz)  Body mass index is 23.99 kg/m².    Intake/Output Summary (Last 24 hours) at 11/26/2022 1455  Last data filed at 11/25/2022 1845  Gross per 24 hour   Intake 600 ml   Output 400 ml   Net 200 ml        Physical Exam  Constitutional:       General: She is not in acute distress.  HENT:      Head: Normocephalic and atraumatic.      Mouth/Throat:      Mouth: Mucous membranes are moist.      Pharynx: Oropharynx is clear.   Eyes:      General: No scleral icterus.  Cardiovascular:      Rate and Rhythm: Normal rate and regular rhythm.      Heart sounds: No murmur heard.    No gallop.   Pulmonary:      Effort: Pulmonary effort is normal.      Breath sounds: Normal breath sounds.   Abdominal:      General: Bowel sounds are normal. There is no distension.      Tenderness: There is no abdominal tenderness.   Musculoskeletal:      Right lower leg: Edema present.      Left lower leg: Edema present.      Comments: 1+ bilateral LE edema   Skin:     General: Skin is warm and dry.   Neurological:      Mental Status: She is alert and oriented to person, place, and time. Mental status is at baseline.   Psychiatric:         Mood and Affect: Mood normal.         Behavior: Behavior normal.       Significant Labs: All pertinent labs within the past 24 hours have been reviewed.  Recent Lab Results  (Last 5 results in the past 24 hours)        11/26/22  1058   11/26/22  0831   11/26/22  0303   11/26/22  0302   11/25/22 2013        Anion Gap     13           Baso #       0.02         Basophil %       0.2         BUN     90           Calcium     7.6           Chloride     99           CO2     24           Creatinine     10.0           Differential Method       Automated         eGFR     3.7           Eos #       0.0         Eosinophil %       0.0         Folate     9.1           Glucose     85           Gran # (ANC)       7.8         Gran %        77.0         Group & Rh       B POS         Hematocrit       26.0         Hemoglobin       8.6         Immature Grans (Abs)       0.22  Comment: Mild elevation in immature granulocytes is non specific and   can be seen in a variety of conditions including stress response,   acute inflammation, trauma and pregnancy. Correlation with other   laboratory and clinical findings is essential.           Immature Granulocytes       2.2         INDIRECT NUVIA       NEG         Lymph #       1.5         Lymph %       14.6         Magnesium       1.9         MCH       29.1         MCHC       33.1         MCV       88         Mono #       0.6         Mono %       6.0         MPV       11.9         nRBC       0         Phosphorus       6.7         Platelets       100         POCT Glucose 118   112       172       Potassium     4.2           RBC       2.96         RDW       14.7         Retic       1.2         Sodium     136           Vitamin B-12     1207           WBC       10.08                                Significant Imaging: I have reviewed all pertinent imaging results/findings within the past 24 hours.      Assessment/Plan:      * Microscopic polyangiitis  As of 10/26/22 strongly positive MPO Ab (in contrast to borderline positive PR3), consistent with clinical picture of microscopic polyangiitis with pulmonary, and renal involvement after presenting with acute hypoxemic respiratory failure, hemoptysis, and acute renal failure.  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   3)  CHANGES SUGGESTIVE  OF FOCAL ACUTE PYELONEPHRITIS.   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 Now following PEXIVAS steroid taper. Currently on IV Solumedrol 20 mg daily.   - plan for 20mg IV daily on 11/6 for 14 days (last dose of 20mg equivalent is 11/20/2022).   - apheresis: underwent PLEX 10/26, 10/27, 10/30, 11/1, 11/2, 11/3  - rituximab every 7 days for 4 doses: Dose #1: 10/27, #2 11/3, #3 11/10, and #4 given 11/17  - cyclophosphamide every 14 days for 2 doses: 10/27, 11/10  - Opportunistic Infection ppx: atovaquone 1500mg PO daily  - GI bleed prophylaxis: pantoprazole 40mg daily   11/17 Rheumatology following for steroid dosing and chemotherapeutic agents. on IV steroids due to p.o. intake issues, however can transition to p.o. when swallowing issues reliably resolved - on steroid taper - solumedrol .  rituximab dose due November 17 11/18  switched to prednisone taper by nephrology  11/24: No changes. Having good UOP but Cr still greater than 8. Main concern at this time is whether patient will need to go on dialysis or not. She did get one session two days ago. UOP is improving which is of course encouraging. Has triaylisis line still in place if needed. Nephro following  11/25: No changes, still following BMP and UOP to determine ultimate need for HD. Renal following  11/26: Still no changes, UOP picking up      Primary pauci-immune necrotizing and crescentic glomerulonephritis  Patient with acute kidney injury likely due to microscopic polyangiitis WILFREDO is currently stable. Labs reviewed- Renal function/electrolytes with Estimated  Creatinine Clearance: 4 mL/min (A) (based on SCr of 10 mg/dL (H)). according to latest data. Monitor urine output and serial BMP and adjust therapy as needed. Avoid nephrotoxins and renally dose meds for GFR listed above.       Gastric reflux  PPI BID  Elevated HOB; feeds changed to bolus and tolerating well  Symptoms initially improved however reoccurrence on 11/13 without significant dietary changes; GI planning for EGD 11/14  TF further adjusted for smaller, more frequent bolus   s/p EGD 11/14; Normal Study      High risk medications (not anticoagulants) long-term use  as above      Anuria    11/17 Nephrology following for HD and  monitoring for renal recovery with some  improvement in UOP in last 2 days. will need PermCath and HD chair set up if no renal  recovery.     11/24: Still following to determine need for HD although anuria has resolved    Gastrointestinal hemorrhage with melena  Starting having hemoptysis and melena with associated acute blood loss anemia earlier in hospital stay. Patient with known internal hemorrhoids, mild sigmoid diverticulosis, history of gastritis and + H pylori (2012 EGD).   - GI consulted 10/19, unable to perform EGD due to instability and respiratory failure at the time    Plan  - patient unable to take PO PPI due to NGT removal on 11/5, transition to IV PPI 40mg pantoprazole daily, return to per NG tube once replaced  - s/p EGD 11/14; Normal Study  - PPI transitioned to BID as concern for GERD  - Monitor CBC closely for signs of recurrent bleed  11/17 Hb trended dopwn to 6.6. Transfusion with 1 unit of PRBC .  111/8  continue protonix oral     11/25: Hgb trending down today and will need 1 u PRBC. Denies further melena. Will check iron panel, B12, folate, retic count in AM, FOBT, and consider reconsult to GI for consideration of colonoscopy. Also could be contribution from renal disease. HDS    Dysphagia  SLP following  MBSS showing global weakness in swallowing as well as  aspiration with thin liquids.   ENT consulted but patient did not tolerate laryngoscopy     Plan   - Discussed case with Rheumatology team, they are concerned that this dysphagia may have been from prior stroke, requesting imaging for evaluation-  CT demonstrated no acute findings or causes for dysphagia NOR did MRI   - removed NGT and place dobhoff which is more comfortable and makes swallowing easier compared to NGT.    - continue working with speech therapy   -exam concerning for thrush; nystatin swish and swallow initiated; GI consulted as concern that oropharyngeal candidiasis may be contributing to dysphagia  -Per GI, Lower suspicion for esophageal candidiasis without odynophagia however can If white plaques have been seen on oropharynx, ok to start fluconazole empirically for possible esophageal candidiasis  -EGD on 11/14 without abnormality; per GI, Suspect some aspect of esophageal dysmotility in setting of critical illness. No further testing at this time.  -diet initiated per SLP on 11/16; NG tube removed  -continue to monitor p.o. intake closely  11/17 SLP recs Mechanical soft, Liquid Diet Level: Thin   11/22 advanced to renal diet     Moderate malnutrition  Nutrition consulted. Most recent weight and BMI monitored-          Malnutrition (Moderate to Severe)  Weight Loss (Malnutrition): 7.5% in 3 months              Measurements:  Wt Readings from Last 1 Encounters:   11/22/22 57.6 kg (126 lb 15.8 oz)   Body mass index is 23.99 kg/m².    Recommendations: Recommendation/Intervention: 1. Per SLP, remove Dysphagia soft diet restrictions. Continue Renal diet & encourage PO intake as tolerated. 2. If pt agreeable, add Novasource ONS. 3. RD to monitor & follow-up.  Goals: Meet % EEN, EPN by RD f/u date    Patient has been screened and assessed by RD. RD will follow patient.      Encounter for continuous renal replacement therapy (CRRT) for acute renal failure  Due to microscopic polyangiitis. Remains on  hemodialysis intermittently.   - Baseline creatinine 1.0-1.2.   - New onset proteinuria/hematuria.   - Renal biopsy completed and   - Trialysis in place for intermittent Hemodialysis    Plan  - Nephrology consulted, appreciate management  - management of MPA as noted separately  - Renal function panel daily  - renally dose all medications  - intermittent Hemodialysis as indicated, per Nephrology   11/17     Recent Labs   Lab 11/24/22  0500 11/25/22  0255 11/26/22  0303   BUN 84* 91* 90*   CREATININE 8.7* 8.9* 10.0*     . Nephrology following for HD and  monitoring for renal recovery with some  improvement in UOP in last 2 days. will need PermCath and HD chair set up if no renal  recovery.  likely SNF pending nephro decision regarding HD   11/20  mL /24h. BUN/CR trending up to 113/9.8 , started on sodium bicarb for bicarb 19. continue lokelma 5g daily for 5.1   11/23 HD yesterday without ultrafiltration . UOP 500ml/24h   11/24: UOP 1100 cc in 24hr  11/25: So far today urinated 400cc by about 11am  11/26: Still monitoring for RRT needs    Acute hypoxemic respiratory failure  Multifocal pneumonia  Hemoptysis  Dyspnea  Diffuse Alveolar Hemorrahge  In the setting of a new diagnosis of microscopic polyangiitis, presented with fevers, dyspnea,.  - Chest imaging was consistent with diffuse alveolar hemorrhage.  CT chest wc 10/17 with bl perihilar dense consolidations w/ peripheral patcy areas of GGO, BL PNA, less c/f cardiogenic pulmonary edema.  - Patient upgraded to Medical ICU 10/23/22 with abrupt respiratory decline requiring NIPPV, improved with high dose IV steroids, plasmapheresis, emergent hemodialysis.   - Unable to perform bronchoscopy in the Medical ICU due to tenuous respiratory status  - As of 11/6, hypoxemia has significantly improved    Plan:  - weaned to room air  - continue management of underlying triggers with steroid and immunosuppressants  - incentive spirometry and respiratory hygiene  11/17  sats 92% on RA  11/25: Has been stable on RA for extended period at this time    Lymphadenopathy of head and neck  - Has bilateral neck gland swelling with tenderness,consulted ENT  - No surgical intervention indicated   - Adenopathy likely reactive in nature   - Recommend further workup if it does not resolve within next 2 weeks     Hyponatremia  Has hyponatremia,started on NS. was given 2 grams of sodium chloride tablets TID for likely SIADH  11/18  sodium normalized at 137. salt tablets discontinued.      Other specified anemias  In the setting of GI bleed with melena  - Evaluated by GI, see GI bleed documentation  - Last transfusion 10/28, Hgb slowly trending down since then  - Hgb 6.9 on 11/5      Plan  - Monitor CBC Daily   - Treat with pRBC transfusion PRN Hgb <7  - qualifies for transfusion on 11/5 with Hgb 6.9, 1u pRBC ordered  -stable   11/17 Hb trended dopwn to 6.6. Transfusion with 1 unit of PRBC .consider GI eval  11/24: Stable Hgb and vitals  11/25: Hgb trending down today and will need 1 u PRBC. Denies further melena. Will check iron panel, B12, folate, retic count in AM, FOBT, and consider reconsult to GI for consideration of colonoscopy. Also could be contribution from renal disease. HDS  11/26: Still no stool guaic as she has not stooled but reports brown stool yesterday without melena for a while now. Iron panel c/w anemia of chronic inflammation. Likely also hypoproliferative in the setting of renal disease. Responded well to 1U PRBC yesterday. Think ongoing GIB is unlikely at this time. Will continue to follow    Current CBC reviewed-    Recent Labs   Lab 11/23/22  1518 11/25/22  0255 11/26/22  0302   HGB 7.6* 6.5* 8.6*       Chronic diastolic heart failure  Pulmonary Hypertension due to left heart disease  TTE (10/2022) EF 65%, G1DD  Home meds: Lisinopril, Toprol        Plan  - Active volume control with intermittent Hemodialysis per Nephrology   - Daily weights (standing if tolerated)  -  Strict I/Os  - Fluid restriction 1.5 day  - Cardiac diet w/ 2 g Na restriction    11/25: Does have LE edema as mentioned but more likely secondary to renal failure than heart failure and will continue current management. On room air breathing comfortably      Hyperkalemia    resolved    Primary hypertension  BP Readings from Last 1 Encounters:   11/26/22 (!) 117/56     - Patient is unable to tolerate PO mediations, all meds to be administered via NG tube  -Will continue to monitor blood pressure trend closely and adjust antihypertensive regimen as clinically indicated and tolerated.    amLODIPine tablet 10 mg, 10 mg, Per NG tube, Daily    carvediloL tablet 12.5 mg, 12.5 mg, Per NG tube, BID    hydrALAZINE tablet 25 mg, 25 mg, Per NG tube, Q8H    lisinopriL tablet 40 mg, 40 mg, Per NG tube, Daily    11/24: NG tube has been removed and now taking medications by mouth  11/26: /56, continue current management      Hypothyroid  - Continue synthroid 25 mcg  - TSH 0.585 10/27/22      VTE Risk Mitigation (From admission, onward)         Ordered     heparin (porcine) injection 1,000 Units  As needed (PRN)         11/22/22 0832     heparin, porcine (PF) 100 unit/mL injection flush 500 Units  As needed (PRN)         11/17/22 1343     heparin, porcine (PF) 100 unit/mL injection flush 500 Units  As needed (PRN)         11/10/22 1430     heparin (porcine) injection 1,000 Units  As needed (PRN)         11/09/22 1601     heparin (porcine) injection 1,000 Units  As needed (PRN)         11/08/22 0854     Place sequential compression device  Until discontinued         10/25/22 1731     IP VTE HIGH RISK PATIENT  Once         10/17/22 1354     Reason for No Pharmacological VTE Prophylaxis  Once        Question:  Reasons:  Answer:  Risk of Bleeding    10/17/22 1354                Discharge Planning   ANTHONY: 11/29/2022     Code Status: Full Code   Is the patient medically ready for discharge?: No    Reason for patient still in  hospital (select all that apply): Treatment  Discharge Plan A: Skilled Nursing Facility   Discharge Delays: None known at this time              Ej Fregoso MD  Department of Hospital Medicine   [facility] - Intensive Care ([place]-14)

## 2022-11-26 NOTE — SUBJECTIVE & OBJECTIVE
Interval History: see above    Review of Systems   Constitutional:  Negative for fatigue and fever.   HENT: Negative.     Respiratory:  Negative for chest tightness and shortness of breath.    Cardiovascular:  Negative for chest pain and leg swelling.   Gastrointestinal:  Positive for nausea. Negative for abdominal pain, constipation and diarrhea.   Genitourinary:  Negative for difficulty urinating and dysuria.        Reports increased UOP today   Musculoskeletal: Negative.    Skin:  Negative for rash.   Neurological:  Negative for light-headedness and headaches.   Hematological:  Does not bruise/bleed easily.   Psychiatric/Behavioral:  Negative for agitation and behavioral problems.    Objective:     Vital Signs (Most Recent):  Temp: 98.7 °F (37.1 °C) (11/26/22 1054)  Pulse: 70 (11/26/22 1054)  Resp: 18 (11/26/22 1054)  BP: (!) 117/56 (11/26/22 1054)  SpO2: 95 % (11/26/22 1054)   Vital Signs (24h Range):  Temp:  [96.1 °F (35.6 °C)-98.7 °F (37.1 °C)] 98.7 °F (37.1 °C)  Pulse:  [62-73] 70  Resp:  [16-19] 18  SpO2:  [90 %-96 %] 95 %  BP: ()/(53-61) 117/56     Weight: 57.6 kg (126 lb 15.8 oz)  Body mass index is 23.99 kg/m².    Intake/Output Summary (Last 24 hours) at 11/26/2022 1455  Last data filed at 11/25/2022 1845  Gross per 24 hour   Intake 600 ml   Output 400 ml   Net 200 ml        Physical Exam  Constitutional:       General: She is not in acute distress.  HENT:      Head: Normocephalic and atraumatic.      Mouth/Throat:      Mouth: Mucous membranes are moist.      Pharynx: Oropharynx is clear.   Eyes:      General: No scleral icterus.  Cardiovascular:      Rate and Rhythm: Normal rate and regular rhythm.      Heart sounds: No murmur heard.    No gallop.   Pulmonary:      Effort: Pulmonary effort is normal.      Breath sounds: Normal breath sounds.   Abdominal:      General: Bowel sounds are normal. There is no distension.      Tenderness: There is no abdominal tenderness.   Musculoskeletal:      Right  lower leg: Edema present.      Left lower leg: Edema present.      Comments: 1+ bilateral LE edema   Skin:     General: Skin is warm and dry.   Neurological:      Mental Status: She is alert and oriented to person, place, and time. Mental status is at baseline.   Psychiatric:         Mood and Affect: Mood normal.         Behavior: Behavior normal.       Significant Labs: All pertinent labs within the past 24 hours have been reviewed.  Recent Lab Results  (Last 5 results in the past 24 hours)        11/26/22  1058   11/26/22  0831   11/26/22  0303   11/26/22  0302   11/25/22 2013        Anion Gap     13           Baso #       0.02         Basophil %       0.2         BUN     90           Calcium     7.6           Chloride     99           CO2     24           Creatinine     10.0           Differential Method       Automated         eGFR     3.7           Eos #       0.0         Eosinophil %       0.0         Folate     9.1           Glucose     85           Gran # (ANC)       7.8         Gran %       77.0         Group & Rh       B POS         Hematocrit       26.0         Hemoglobin       8.6         Immature Grans (Abs)       0.22  Comment: Mild elevation in immature granulocytes is non specific and   can be seen in a variety of conditions including stress response,   acute inflammation, trauma and pregnancy. Correlation with other   laboratory and clinical findings is essential.           Immature Granulocytes       2.2         INDIRECT NUVIA       NEG         Lymph #       1.5         Lymph %       14.6         Magnesium       1.9         MCH       29.1         MCHC       33.1         MCV       88         Mono #       0.6         Mono %       6.0         MPV       11.9         nRBC       0         Phosphorus       6.7         Platelets       100         POCT Glucose 118   112       172       Potassium     4.2           RBC       2.96         RDW       14.7         Retic       1.2         Sodium     136            Vitamin B-12     1207           WBC       10.08                                Significant Imaging: I have reviewed all pertinent imaging results/findings within the past 24 hours.

## 2022-11-26 NOTE — PLAN OF CARE
Problem: Adult Inpatient Plan of Care  Goal: Plan of Care Review  Outcome: Ongoing, Progressing     Problem: Infection  Goal: Absence of Infection Signs and Symptoms  Outcome: Ongoing, Progressing     Problem: Nutrition Impaired (Sepsis/Septic Shock)  Goal: Optimal Nutrition Intake  Outcome: Ongoing, Progressing     AAOx4, RA, VS and BG stable, no report of SOB, discomfort, or pain. Ambulated to bathroom with only standby assist, OOB and in chair for a few hours, worked with PT in afternoon. Safety measures in place.

## 2022-11-26 NOTE — PLAN OF CARE
Problem: Adult Inpatient Plan of Care  Goal: Plan of Care Review  Outcome: Ongoing, Progressing  Goal: Absence of Hospital-Acquired Illness or Injury  Outcome: Ongoing, Progressing  Goal: Optimal Comfort and Wellbeing  Outcome: Ongoing, Progressing     Problem: Infection  Goal: Absence of Infection Signs and Symptoms  Outcome: Ongoing, Progressing     Problem: Bleeding (Sepsis/Septic Shock)  Goal: Absence of Bleeding  Outcome: Ongoing, Progressing     Problem: Fall Injury Risk  Goal: Absence of Fall and Fall-Related Injury  Outcome: Ongoing, Progressing     Problem: Impaired Wound Healing  Goal: Optimal Wound Healing  Outcome: Ongoing, Progressing

## 2022-11-26 NOTE — PROGRESS NOTES
Pharmacist Renal Dose Adjustment Note    Kristin Goodamn is a 77 y.o. female being treated with the medication metoclopramide    Patient Data:    Vital Signs (Most Recent):  Temp: 96.1 °F (35.6 °C) (11/26/22 0510)  Pulse: 70 (11/26/22 0510)  Resp: 19 (11/26/22 0510)  BP: (!) 116/58 (11/26/22 0510)  SpO2: (!) 90 % (11/26/22 0510)   Vital Signs (72h Range):  Temp:  [96.1 °F (35.6 °C)-98.7 °F (37.1 °C)]   Pulse:  [61-73]   Resp:  [14-20]   BP: ()/(53-61)   SpO2:  [90 %-100 %]      Recent Labs   Lab 11/24/22  0500 11/25/22  0255 11/26/22  0303   CREATININE 8.7* 8.9* 10.0*     Serum creatinine: 10 mg/dL (H) 11/26/22 0303  Estimated creatinine clearance: 4 mL/min (A)    Medication:metoclopramide 10mg IV will be changed to metoclopramide 5mg IV  Pharmacist's Name: Lashanda Oliver  Pharmacist's Extension: [phone]

## 2022-11-26 NOTE — PT/OT/SLP PROGRESS
Physical Therapy Treatment    Patient Name:  Kristin Goodman   MRN:  [id number]  Admitting Diagnosis:  Microscopic polyangiitis   Recent Surgery: Procedure(s) (LRB):  EGD (ESOPHAGOGASTRODUODENOSCOPY) (N/A) 12 Days Post-Op  Admit Date: 10/17/2022  Length of Stay: 40 days    Recommendations:     Discharge Recommendations:  nursing facility, skilled   Discharge Equipment Recommendations: bedside commode   Barriers to discharge: Evolving Clinical Presentation    Assessment:     Kristin Goodman is a 77 y.o. female admitted with a medical diagnosis of Microscopic polyangiitis.  She presents with the following impairments/functional limitations:  weakness, impaired endurance, impaired self care skills, gait instability, impaired functional mobility, impaired balance, decreased lower extremity function.     Pt reluctantly agreeable to therapy, upset because she just got in bed from the chair. Pt sits EOB from supine with min A, min A to stand from EOB to RW, CGA for ambulation with RW, limited by decreased endurance with notable fatigue at end of gait trial. Therex performed at EOB.     Rehab Prognosis: Fair; patient would benefit from acute skilled PT services to address these deficits and reach maximum level of function.    Recent Surgery: Procedure(s) (LRB):  EGD (ESOPHAGOGASTRODUODENOSCOPY) (N/A) 12 Days Post-Op    Treatment Tolerated: Fairly Well    Highest level of mobility achieved this visit: ambulates 15ft w/ RW and CGA    Activity with RN/PCT: short distance transfers with 1 person assist    Plan:     During this hospitalization, patient to be seen 3 x/week to address the identified rehab impairments via gait training, therapeutic activities, therapeutic exercises, neuromuscular re-education and progress toward the following goals:    Plan of Care Expires:  11/30/22    Subjective     CRISTINE Martinez notified prior to session. Pt's daughter present upon PT entrance into room.    Chief Complaint: fatigue  Patient/Family  "Comments/goals: "I just got in the bed"  Pain/Comfort:  Pain Rating 1: 0/10      Objective:     Additional staff present: none    Patient found supine with: Trialysis   Cognition:   Alert  Command following: Follows two-step verbal commands  Fluency: clear/fluent  General Precautions: Standard, Cardiac fall   Orthopedic Precautions:N/A   Braces: N/A   Body mass index is 23.99 kg/m².  Oxygen Device: Room Air      Vitals: BP (!) 117/56 (BP Location: Right arm, Patient Position: Lying)   Pulse 64   Temp 98 °F (36.7 °C) (Oral)   Resp 17   Ht 5' 1" (1.549 m)   Wt 57.6 kg (126 lb 15.8 oz)   SpO2 (!) 92%   Breastfeeding No   BMI 23.99 kg/m²     Outcome Measures:  AM-PAC 6 CLICK MOBILITY  Turning over in bed (including adjusting bedclothes, sheets and blankets)?: 3  Sitting down on and standing up from a chair with arms (e.g., wheelchair, bedside commode, etc.): 3  Moving from lying on back to sitting on the side of the bed?: 3  Moving to and from a bed to a chair (including a wheelchair)?: 3  Need to walk in hospital room?: 3  Climbing 3-5 steps with a railing?: 1  Basic Mobility Total Score: 16     Functional Mobility:    Bed Mobility:   Scooting to HOB via supine bridge: stand by assistance  Supine to Sit: minimum assistance; from R side of bed  Scooting anteriorly to EOB to have both feet planted on floor: stand by assistance  Sit to Supine: minimum assistance; to L side of bed    Sitting Balance at Edge of Bed:  Assistance Level Required: Stand-by Assistance  Time: 3 min + 10 min  Postural deviations noted: rounded shoulders    Transfers:   Sit <> Stand Transfer: minimum assistance with rolling walker   Stand <> Sit Transfer: minimum assistance with rolling walker   [id number] from EOB    Standing Balance:  Assistance Level Required: Contact Guard Assistance  Patient used: rolling walker   Time: 5 min  Postural deviations noted: no deviations noted  Encouraged: upright stance      Gait:  Patient ambulated: 15ft "   Patient required: contact guard  Patient used:  rolling walker   Gait Pattern observed: swing-through gait  Gait Deviation(s): decreased step length and decreased obey  Impairments due to: impaired balance, decreased strength, and decreased endurance  Gait belt utilized    Therapeutic Exercises:   Seated AROM: Pt performed Marches, LAQs x 10 repetitions with facilitation for correct performance and sequencing. Exercises performed to develop and maintain pt's  strength.   Supine AROM: pt performed Ankle pumps x 15 JESSICA to improve strength and help with LE edema.    Education:  Time provided for education, counseling and discussion of health disposition in regards to patient's current status  All questions answered within PT scope of practice and to patient's satisfaction  PT role in POC to address current functional deficits  Pt educated on proper body mechanics, safety techniques, and energy conservation with PT facilitation and cueing throughout session    Patient left supine with all lines intact and call button in reach.    GOALS:   Multidisciplinary Problems       Physical Therapy Goals          Problem: Physical Therapy    Goal Priority Disciplines Outcome Goal Variances Interventions   Physical Therapy Goal     PT, PT/OT Ongoing, Progressing     Description: Goals to be met by: 2022     Patient will increase functional independence with mobility by performin. Supine to sit with contact guard assistance  2. Sit to supine with contact guard assistance  3. Sit to stand transfer with minimum assistance MET   3a. STS supvn LRAD  4. Gait  x 40 feet with minimum assistance using LRAD as needed  5. Lower extremity exercise program x10 reps per handout, with independence                      Time Tracking:     PT Received On: 22  PT Start Time: 1239     PT Stop Time: 1256  PT Total Time (min): 17 min     Billable Minutes:   Gait Training 10 min    Treatment Type: Treatment  PT/PTA: PT        Khai Velasco, PT, DPT  11/26/2022

## 2022-11-26 NOTE — ASSESSMENT & PLAN NOTE
As of 10/26/22 strongly positive MPO Ab (in contrast to borderline positive PR3), consistent with clinical picture of microscopic polyangiitis with pulmonary, and renal involvement after presenting with acute hypoxemic respiratory failure, hemoptysis, and acute renal failure.  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   3)  CHANGES SUGGESTIVE OF FOCAL ACUTE PYELONEPHRITIS.   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 Now following PEXIVAS steroid taper. Currently on IV Solumedrol 20 mg daily.   - plan for 20mg IV daily on 11/6 for 14 days (last dose of 20mg equivalent is 11/20/2022).   - apheresis: underwent PLEX 10/26, 10/27, 10/30, 11/1, 11/2, 11/3  - rituximab every 7 days for 4 doses: Dose #1: 10/27, #2 11/3, #3 11/10, and #4 given 11/17  - cyclophosphamide every 14 days for 2 doses: 10/27, 11/10  - Opportunistic Infection ppx: atovaquone 1500mg PO daily  - GI bleed prophylaxis: pantoprazole 40mg daily   11/17 Rheumatology following for steroid dosing and chemotherapeutic agents. on IV steroids due to p.o. intake issues, however can transition to p.o. when swallowing issues reliably resolved - on  steroid taper - solumedrol .  rituximab dose due November 17 11/18  switched to prednisone taper by nephrology  11/24: No changes. Having good UOP but Cr still greater than 8. Main concern at this time is whether patient will need to go on dialysis or not. She did get one session two days ago. UOP is improving which is of course encouraging. Has triaylisis line still in place if needed. Nephro following  11/25: No changes, still following BMP and UOP to determine ultimate need for HD. Renal following  11/26: Still no changes, UOP picking up

## 2022-11-26 NOTE — ASSESSMENT & PLAN NOTE
Due to microscopic polyangiitis. Remains on hemodialysis intermittently.   - Baseline creatinine 1.0-1.2.   - New onset proteinuria/hematuria.   - Renal biopsy completed and   - Trialysis in place for intermittent Hemodialysis    Plan  - Nephrology consulted, appreciate management  - management of MPA as noted separately  - Renal function panel daily  - renally dose all medications  - intermittent Hemodialysis as indicated, per Nephrology   11/17     Recent Labs   Lab 11/24/22  0500 11/25/22  0255 11/26/22  0303   BUN 84* 91* 90*   CREATININE 8.7* 8.9* 10.0*     . Nephrology following for HD and  monitoring for renal recovery with some  improvement in UOP in last 2 days. will need PermCath and HD chair set up if no renal  recovery.  likely SNF pending nephro decision regarding HD   11/20  mL /24h. BUN/CR trending up to 113/9.8 , started on sodium bicarb for bicarb 19. continue lokelma 5g daily for 5.1   11/23 HD yesterday without ultrafiltration . UOP 500ml/24h   11/24: UOP 1100 cc in 24hr  11/25: So far today urinated 400cc by about 11am  11/26: Still monitoring for RRT needs

## 2022-11-26 NOTE — ASSESSMENT & PLAN NOTE
In the setting of GI bleed with melena  - Evaluated by GI, see GI bleed documentation  - Last transfusion 10/28, Hgb slowly trending down since then  - Hgb 6.9 on 11/5      Plan  - Monitor CBC Daily   - Treat with pRBC transfusion PRN Hgb <7  - qualifies for transfusion on 11/5 with Hgb 6.9, 1u pRBC ordered  -stable   11/17 Hb trended dopwn to 6.6. Transfusion with 1 unit of PRBC .consider GI eval  11/24: Stable Hgb and vitals  11/25: Hgb trending down today and will need 1 u PRBC. Denies further melena. Will check iron panel, B12, folate, retic count in AM, FOBT, and consider reconsult to GI for consideration of colonoscopy. Also could be contribution from renal disease. HDS  11/26: Still no stool guaic as she has not stooled but reports brown stool yesterday without melena for a while now. Iron panel c/w anemia of chronic inflammation. Likely also hypoproliferative in the setting of renal disease. Responded well to 1U PRBC yesterday. Think ongoing GIB is unlikely at this time. Will continue to follow    Current CBC reviewed-    Recent Labs   Lab 11/23/22  1518 11/25/22  0255 11/26/22  0302   HGB 7.6* 6.5* 8.6*

## 2022-11-27 LAB
ANION GAP SERPL CALC-SCNC: 14 MMOL/L (ref 8–16)
BASOPHILS # BLD AUTO: 0.02 K/UL (ref 0–0.2)
BASOPHILS NFR BLD: 0.2 % (ref 0–1.9)
BUN SERPL-MCNC: 91 MG/DL (ref 8–23)
CALCIUM SERPL-MCNC: 7.4 MG/DL (ref 8.7–10.5)
CHLORIDE SERPL-SCNC: 100 MMOL/L (ref 95–110)
CO2 SERPL-SCNC: 23 MMOL/L (ref 23–29)
CREAT SERPL-MCNC: 10.5 MG/DL (ref 0.5–1.4)
DIFFERENTIAL METHOD: ABNORMAL
EOSINOPHIL # BLD AUTO: 0 K/UL (ref 0–0.5)
EOSINOPHIL NFR BLD: 0.2 % (ref 0–8)
ERYTHROCYTE [DISTWIDTH] IN BLOOD BY AUTOMATED COUNT: 15.1 % (ref 11.5–14.5)
EST. GFR  (NO RACE VARIABLE): 3.5 ML/MIN/1.73 M^2
GLUCOSE SERPL-MCNC: 79 MG/DL (ref 70–110)
HCT VFR BLD AUTO: 27.7 % (ref 37–48.5)
HGB BLD-MCNC: 9.1 G/DL (ref 12–16)
IMM GRANULOCYTES # BLD AUTO: 0.17 K/UL (ref 0–0.04)
IMM GRANULOCYTES NFR BLD AUTO: 1.5 % (ref 0–0.5)
LYMPHOCYTES # BLD AUTO: 1.5 K/UL (ref 1–4.8)
LYMPHOCYTES NFR BLD: 13.2 % (ref 18–48)
MAGNESIUM SERPL-MCNC: 1.8 MG/DL (ref 1.6–2.6)
MCH RBC QN AUTO: 29.5 PG (ref 27–31)
MCHC RBC AUTO-ENTMCNC: 32.9 G/DL (ref 32–36)
MCV RBC AUTO: 90 FL (ref 82–98)
MONOCYTES # BLD AUTO: 0.8 K/UL (ref 0.3–1)
MONOCYTES NFR BLD: 6.9 % (ref 4–15)
NEUTROPHILS # BLD AUTO: 8.6 K/UL (ref 1.8–7.7)
NEUTROPHILS NFR BLD: 78 % (ref 38–73)
NRBC BLD-RTO: 0 /100 WBC
PHOSPHATE SERPL-MCNC: 6.2 MG/DL (ref 2.7–4.5)
PLATELET # BLD AUTO: 104 K/UL (ref 150–450)
PMV BLD AUTO: 11.8 FL (ref 9.2–12.9)
POCT GLUCOSE: 115 MG/DL (ref 70–110)
POCT GLUCOSE: 156 MG/DL (ref 70–110)
POCT GLUCOSE: 157 MG/DL (ref 70–110)
POCT GLUCOSE: 88 MG/DL (ref 70–110)
POTASSIUM SERPL-SCNC: 3.8 MMOL/L (ref 3.5–5.1)
RBC # BLD AUTO: 3.08 M/UL (ref 4–5.4)
SODIUM SERPL-SCNC: 137 MMOL/L (ref 136–145)
WBC # BLD AUTO: 10.98 K/UL (ref 3.9–12.7)

## 2022-11-27 PROCEDURE — 80048 BASIC METABOLIC PNL TOTAL CA: CPT | Performed by: STUDENT IN AN ORGANIZED HEALTH CARE EDUCATION/TRAINING PROGRAM

## 2022-11-27 PROCEDURE — 25000003 PHARM REV CODE 250: Performed by: HOSPITALIST

## 2022-11-27 PROCEDURE — 94761 N-INVAS EAR/PLS OXIMETRY MLT: CPT

## 2022-11-27 PROCEDURE — 25000003 PHARM REV CODE 250: Performed by: STUDENT IN AN ORGANIZED HEALTH CARE EDUCATION/TRAINING PROGRAM

## 2022-11-27 PROCEDURE — 36415 COLL VENOUS BLD VENIPUNCTURE: CPT

## 2022-11-27 PROCEDURE — 99233 SBSQ HOSP IP/OBS HIGH 50: CPT | Mod: ,,, | Performed by: INTERNAL MEDICINE

## 2022-11-27 PROCEDURE — 84100 ASSAY OF PHOSPHORUS: CPT

## 2022-11-27 PROCEDURE — 82272 OCCULT BLD FECES 1-3 TESTS: CPT | Performed by: STUDENT IN AN ORGANIZED HEALTH CARE EDUCATION/TRAINING PROGRAM

## 2022-11-27 PROCEDURE — 20600001 HC STEP DOWN PRIVATE ROOM

## 2022-11-27 PROCEDURE — 85025 COMPLETE CBC W/AUTO DIFF WBC: CPT | Performed by: STUDENT IN AN ORGANIZED HEALTH CARE EDUCATION/TRAINING PROGRAM

## 2022-11-27 PROCEDURE — 99232 SBSQ HOSP IP/OBS MODERATE 35: CPT | Mod: ,,, | Performed by: STUDENT IN AN ORGANIZED HEALTH CARE EDUCATION/TRAINING PROGRAM

## 2022-11-27 PROCEDURE — 99232 PR SUBSEQUENT HOSPITAL CARE,LEVL II: ICD-10-PCS | Mod: ,,, | Performed by: STUDENT IN AN ORGANIZED HEALTH CARE EDUCATION/TRAINING PROGRAM

## 2022-11-27 PROCEDURE — 63600175 PHARM REV CODE 636 W HCPCS: Performed by: STUDENT IN AN ORGANIZED HEALTH CARE EDUCATION/TRAINING PROGRAM

## 2022-11-27 PROCEDURE — 99233 PR SUBSEQUENT HOSPITAL CARE,LEVL III: ICD-10-PCS | Mod: ,,, | Performed by: INTERNAL MEDICINE

## 2022-11-27 PROCEDURE — 83735 ASSAY OF MAGNESIUM: CPT

## 2022-11-27 PROCEDURE — 25000003 PHARM REV CODE 250

## 2022-11-27 RX ADMIN — NYSTATIN 500000 UNITS: 500000 SUSPENSION ORAL at 01:11

## 2022-11-27 RX ADMIN — SODIUM ZIRCONIUM CYCLOSILICATE 5 G: 5 POWDER, FOR SUSPENSION ORAL at 03:11

## 2022-11-27 RX ADMIN — CARVEDILOL 25 MG: 25 TABLET, FILM COATED ORAL at 09:11

## 2022-11-27 RX ADMIN — HYDRALAZINE HYDROCHLORIDE 100 MG: 50 TABLET ORAL at 09:11

## 2022-11-27 RX ADMIN — PANTOPRAZOLE SODIUM 40 MG: 40 TABLET, DELAYED RELEASE ORAL at 06:11

## 2022-11-27 RX ADMIN — PANTOPRAZOLE SODIUM 40 MG: 40 TABLET, DELAYED RELEASE ORAL at 03:11

## 2022-11-27 RX ADMIN — HYDRALAZINE HYDROCHLORIDE 100 MG: 50 TABLET ORAL at 01:11

## 2022-11-27 RX ADMIN — LEVOTHYROXINE SODIUM 25 MCG: 25 TABLET ORAL at 06:11

## 2022-11-27 RX ADMIN — ATOVAQUONE 1500 MG: 750 SUSPENSION ORAL at 09:11

## 2022-11-27 RX ADMIN — PREDNISONE 20 MG: 20 TABLET ORAL at 09:11

## 2022-11-27 RX ADMIN — NYSTATIN 500000 UNITS: 500000 SUSPENSION ORAL at 04:11

## 2022-11-27 RX ADMIN — SODIUM BICARBONATE 650 MG TABLET 650 MG: at 09:11

## 2022-11-27 RX ADMIN — SODIUM ZIRCONIUM CYCLOSILICATE 5 G: 5 POWDER, FOR SUSPENSION ORAL at 09:11

## 2022-11-27 RX ADMIN — NYSTATIN 500000 UNITS: 500000 SUSPENSION ORAL at 09:11

## 2022-11-27 RX ADMIN — HYDRALAZINE HYDROCHLORIDE 100 MG: 50 TABLET ORAL at 06:11

## 2022-11-27 RX ADMIN — AMLODIPINE BESYLATE 10 MG: 10 TABLET ORAL at 09:11

## 2022-11-27 RX ADMIN — QUETIAPINE FUMARATE 25 MG: 25 TABLET ORAL at 09:11

## 2022-11-27 NOTE — PLAN OF CARE
Problem: Adult Inpatient Plan of Care  Goal: Plan of Care Review  Outcome: Ongoing, Progressing  Goal: Optimal Comfort and Wellbeing  Outcome: Ongoing, Progressing     Problem: Infection Progression (Sepsis/Septic Shock)  Goal: Absence of Infection Signs and Symptoms  Outcome: Ongoing, Progressing     Problem: Nutrition Impaired (Sepsis/Septic Shock)  Goal: Optimal Nutrition Intake  Outcome: Ongoing, Progressing

## 2022-11-27 NOTE — PLAN OF CARE
Problem: Adult Inpatient Plan of Care  Goal: Patient-Specific Goal (Individualized)  Outcome: Ongoing, Progressing     Problem: Adult Inpatient Plan of Care  Goal: Absence of Hospital-Acquired Illness or Injury  Outcome: Ongoing, Progressing     Problem: Adult Inpatient Plan of Care  Goal: Optimal Comfort and Wellbeing  Outcome: Ongoing, Progressing     AAOx4, RA, denies SOB, pain, or discomfort. Ambulated to bathroom and in chair for several hours. Use of IS and ambulation encouraged.

## 2022-11-27 NOTE — SUBJECTIVE & OBJECTIVE
Interval History: see above    Review of Systems   Constitutional:  Negative for fatigue and fever.   HENT: Negative.     Respiratory:  Negative for chest tightness and shortness of breath.    Cardiovascular:  Negative for chest pain and leg swelling.   Gastrointestinal:  Negative for abdominal pain, constipation, diarrhea and nausea.        Nausea is better this AM   Genitourinary:  Negative for difficulty urinating and dysuria.   Musculoskeletal: Negative.    Skin:  Negative for rash.   Neurological:  Negative for light-headedness and headaches.   Hematological:  Does not bruise/bleed easily.   Psychiatric/Behavioral:  Negative for agitation and behavioral problems.    Objective:     Vital Signs (Most Recent):  Temp: 98.2 °F (36.8 °C) (11/27/22 1200)  Pulse: 68 (11/27/22 1200)  Resp: 18 (11/27/22 1200)  BP: (!) 119/57 (11/27/22 1200)  SpO2: (!) 92 % (11/27/22 1200)   Vital Signs (24h Range):  Temp:  [96.5 °F (35.8 °C)-99 °F (37.2 °C)] 98.2 °F (36.8 °C)  Pulse:  [64-68] 68  Resp:  [17-18] 18  SpO2:  [90 %-98 %] 92 %  BP: (105-120)/(52-58) 119/57     Weight: 57.6 kg (126 lb 15.8 oz)  Body mass index is 23.99 kg/m².    Intake/Output Summary (Last 24 hours) at 11/27/2022 1350  Last data filed at 11/27/2022 1000  Gross per 24 hour   Intake 300 ml   Output 650 ml   Net -350 ml        Physical Exam  Constitutional:       General: She is not in acute distress.  HENT:      Head: Normocephalic and atraumatic.      Mouth/Throat:      Mouth: Mucous membranes are moist.      Pharynx: Oropharynx is clear.   Eyes:      General: No scleral icterus.  Cardiovascular:      Rate and Rhythm: Normal rate and regular rhythm.      Heart sounds: No murmur heard.    No gallop.   Pulmonary:      Effort: Pulmonary effort is normal.      Breath sounds: Normal breath sounds.   Abdominal:      General: Bowel sounds are normal. There is no distension.      Tenderness: There is no abdominal tenderness.   Musculoskeletal:      Right lower leg:  Edema present.      Left lower leg: Edema present.      Comments: 1+ bilateral LE edema   Skin:     General: Skin is warm and dry.   Neurological:      Mental Status: She is alert and oriented to person, place, and time. Mental status is at baseline.   Psychiatric:         Mood and Affect: Mood normal.         Behavior: Behavior normal.       Significant Labs: All pertinent labs within the past 24 hours have been reviewed.  Recent Lab Results  (Last 5 results in the past 24 hours)        11/27/22  1132   11/27/22  0720   11/27/22  0714   11/26/22  1536   11/26/22  1408        Albumin         2.5       Anion Gap   14       14       Baso #   0.02             Basophil %   0.2             BUN   91       94       Calcium   7.4       7.4       Chloride   100       98       CO2   23       22       Creatinine   10.5       9.8       Differential Method   Automated             eGFR   3.5       3.8       Eos #   0.0             Eosinophil %   0.2             Glucose   79       188       Gran # (ANC)   8.6             Gran %   78.0             Hematocrit   27.7             Hemoglobin   9.1             Immature Grans (Abs)   0.17  Comment: Mild elevation in immature granulocytes is non specific and   can be seen in a variety of conditions including stress response,   acute inflammation, trauma and pregnancy. Correlation with other   laboratory and clinical findings is essential.               Immature Granulocytes   1.5             Lymph #   1.5             Lymph %   13.2             Magnesium   1.8             MCH   29.5             MCHC   32.9             MCV   90             Mono #   0.8             Mono %   6.9             MPV   11.8             nRBC   0             Phosphorus   6.2       6.3       Platelets   104             POCT Glucose 115     88   190         Potassium   3.8       4.1       RBC   3.08             RDW   15.1             Sodium   137       134       WBC   10.98                                     Significant Imaging: I have reviewed all pertinent imaging results/findings within the past 24 hours.

## 2022-11-27 NOTE — ASSESSMENT & PLAN NOTE
As of 10/26/22 strongly positive MPO Ab (in contrast to borderline positive PR3), consistent with clinical picture of microscopic polyangiitis with pulmonary, and renal involvement after presenting with acute hypoxemic respiratory failure, hemoptysis, and acute renal failure.  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   3)  CHANGES SUGGESTIVE OF FOCAL ACUTE PYELONEPHRITIS.   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 Now following PEXIVAS steroid taper. Currently on IV Solumedrol 20 mg daily.   - plan for 20mg IV daily on 11/6 for 14 days (last dose of 20mg equivalent is 11/20/2022).   - apheresis: underwent PLEX 10/26, 10/27, 10/30, 11/1, 11/2, 11/3  - rituximab every 7 days for 4 doses: Dose #1: 10/27, #2 11/3, #3 11/10, and #4 given 11/17  - cyclophosphamide every 14 days for 2 doses: 10/27, 11/10  - Opportunistic Infection ppx: atovaquone 1500mg PO daily  - GI bleed prophylaxis: pantoprazole 40mg daily   11/17 Rheumatology following for steroid dosing and chemotherapeutic agents. on IV steroids due to p.o. intake issues, however can transition to p.o. when swallowing issues reliably resolved - on  steroid taper - solumedrol .  rituximab dose due November 17 11/18  switched to prednisone taper by nephrology  11/24: No changes. Having good UOP but Cr still greater than 8. Main concern at this time is whether patient will need to go on dialysis or not. She did get one session two days ago. UOP is improving which is of course encouraging. Has triaylisis line still in place if needed. Nephro following  11/25: No changes, still following BMP and UOP to determine ultimate need for HD. Renal following  11/26: Still no changes, UOP picking up  11/27: Plan for HD tomorrow per nephro, still has left sided trialysis line, and also sodium bicarb and lokelma

## 2022-11-27 NOTE — PROGRESS NOTES
[facility] - [facility] ([address] Medicine  Progress Note    Patient Name: Kristin Goodman  MRN: [id number]  Patient Class: IP- Inpatient   Admission Date: 10/17/2022  Length of Stay: 41 days  Attending Physician: Ej Fregoso MD  Primary Care Provider: Lori Hernandez MD        Subjective:     Principal Problem:Microscopic polyangiitis        HPI:  Kristin Goodman is a 77 y.o. female with a past medical history of HTN, hypothyroidism, HFpEF, and osteoarthritis of the arm who has presented to the ED for cough, SOB, and weakness. Daughter is present at the bedside. Patient presented to the ED on 9/24 with hemoptysis, CT and CXR showed high suspicion for multilobar pneumonia. Patient was discharged with a 10-day course of levofloxacin and an albuterol inhaler. Patient followed up with her PCP on 10/4 with slight improvement in symptoms and went back to work. Patient continued to have worsening symptoms and presented to the ED again on 10/14 for evaluation and no interventions were done. Patient endorses fevers over the last few days up to 102.4 F with progressive SOB. She endorses hemoptysis with moderate amount of blood, generalized weakness, productive cough, SOB, and loss of appetite. Denies chest pain, nausea, vomiting, abdominal pain, or urinary changes.    ED: hypertensive up to 219/93 and tachycardic up to 117. Oxygen saturation on 92% on RA, placed on 5L NC with sats >97%. CBC remarkable for leukocytosis of 16.03 and Hb 7.7. K 3.3. Cr 1.6, baseline ~1.0. . Troponin 0.061. COVID and flu negative. EKG NSR. CT chest with contrast pending at time of admission. Given home amlodipine and lisinopril. Given 500mL NS, IV azithromycin, cefepime and vanc.       Overview/Hospital Course:  Ms. Goodman was admitted to Hospital Medicine for management of multifocal pneumonia and acute hypoxemic respiratory failure after presenting to the ED with fevers, cough, hemoptysis, and dyspnea. She required  escalation to the Medical ICU due to abrupt decline in her respiratory status, associated with hemoptysis and requiring NIPPV. CT chest showed bilateral patchy ground glass opacities. Labs additionally were notable for acute renal failure on CKD3. Pulmonology was consulted while under the care of [facility] and felt this picture was consistent with diffuse alveolar hemorrhage.     Her workup revealed a strongly positive MPO Ab consistent with clinical picture of microscopic polyangiitis with pulmonary and renal involvement. She underwent Trialysis catheter placement 10/25/2022 in the Medical ICU. 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     Rheumatology was consulted, extensive workup revealed MITUL + 1:2560 homogenous, +dsDNA, normal complements, PR3 1.2 (slight +),  (+), cANCA + 1:80, pANCA neg, GBMAb neg, trace cryos. Due to concern for MPA, she was started on pulse dose IV methylprednisolone 1000mg for 3 days, and plasmapheresis under the guidance of Transfusion Medicine. She remains on a steroid taper and has completed PLEX. She additionally received rituximab and cyclophosphamide. OI prophylaxis was provided with atovaquone due to a sulfa allergy.     Nephrology was consulted for evaluation of acute renal failure in the setting of MPA. She did require SLED in the ICU for renal clearance and remains on intermittent hemodialysis. She is expected to continue HD once discharged.     Her acute hypoxemic respiratory failure improved and she was weaned off of supplemental oxygen. She stepped down to [facility] 10/28.     She underwent MBBS for evaluation of dysphagia, which showed global delayed initiation of swallow and aspiration with thin liquids. Due to  concern for possible prior stroke, head imaging was completed and negative for acute finding. She is NPO with NG tube. ENT was consulted for evaluation of dysphagia and cervical adenopathy.  Patient did not tolerate laryngoscopy; unable to visualize vocal cords but given her lack of hoarseness, they did not suspect vocal fold paresis/paralysis. MRI recommended by rheum to fully rule out any potential neurological cause of the patient's dysphagia; but demonstrated   remote left thalamic lacunar type infarct and punctate remote left cerebellar infarcts.  She is continuing to work with speech therapy.  EGD completed 11/14 without abnormalities.  Per GI, Suspect some aspect of esophageal dysmotility in setting of critical illness. No further testing at this time.  Per SLP, diet advanced on 11/15 and NG tube removed.    Her other chronic medical conditions including hypothyroidism, diastolic CHF, and hypertension were managed with her home medications, with dose adjustments as needed.     11/17 Hb trended dopwn to 6.6. Transfusion with 1 unit of PRBC .  Rheumatology following for steroid dosing and chemotherapeutic agents. on IV steroids due to p.o. intake issues, however can transition to p.o. when swallowing issues reliably resolved - on steroid taper - solumedrol   rituximab dose due November 17; has been . Nephrology following for HD and  monitoring for renal recovery with some  improvement in UOP in last 2 days. will need PermCath and HD chair set up if no renal  recovery.  likely SNF pending nephro decision regarding HD . No need for HD today. Continue 2 grams of sodium chloride tablets TID for likely SIADH. SLP recs Mechanical soft, Liquid Diet Level: Thin   11/18  sodium normalized at 137. salt tablets discontinued. Hb at 8.5 s/p 1 U PRBC. UOP 400mL /24h.  K at 5 . switched to prednisone taper by nephrology .started lokelma 5 mg x 3days  11/19  mL /24h. BUN/CR trending up to 102/9.3 ,no HD for now per  nephro  11/20  mL /24h. BUN/CR trending up to 113/9.8 , started on sodium bicarb for bicarb 19. continue lokelma 5g daily for 5.1 .   11/21 nephrology follow up  -No urgent indication for HD today as with no signs of uremic encephalopathy or  O2 requirements  11/22 HD x1 today for uremia   11/23 Hb 7.1 . HD yesterday without ultrafiltration . UOp 500ml/24h   11/24: Hgb up to 7.6 today. Patient looks and feels well. Having good UOP of >1L In last 24 hours. Cr up to 8.7 with BUN 84 today.  11/25: Hgb down to 6.5 today, will transfuse one unit and work up for anemia (did have melena recently although normal EGD). Vitals stable. Does have nausea today giving zofran and metoclopramide. 400cc UOP so far today. Cr trending up slightly  11/26: Hgb up to 8.6 after transfusion. UOP 400cc so far this shift (although not sure if all were charted). Cr still uptrending unfortunately to 10.0 from 8.9 but BUN is 90 from 91  11/27: Cr 10.5 today. Potassium 3.7 and BUN 91. 300cc UOP yesterday and 350cc today. Feeling well        Interval History: see above    Review of Systems   Constitutional:  Negative for fatigue and fever.   HENT: Negative.     Respiratory:  Negative for chest tightness and shortness of breath.    Cardiovascular:  Negative for chest pain and leg swelling.   Gastrointestinal:  Negative for abdominal pain, constipation, diarrhea and nausea.        Nausea is better this AM   Genitourinary:  Negative for difficulty urinating and dysuria.   Musculoskeletal: Negative.    Skin:  Negative for rash.   Neurological:  Negative for light-headedness and headaches.   Hematological:  Does not bruise/bleed easily.   Psychiatric/Behavioral:  Negative for agitation and behavioral problems.    Objective:     Vital Signs (Most Recent):  Temp: 98.2 °F (36.8 °C) (11/27/22 1200)  Pulse: 68 (11/27/22 1200)  Resp: 18 (11/27/22 1200)  BP: (!) 119/57 (11/27/22 1200)  SpO2: (!) 92 % (11/27/22 1200)   Vital Signs (24h Range):  Temp:   [96.5 °F (35.8 °C)-99 °F (37.2 °C)] 98.2 °F (36.8 °C)  Pulse:  [64-68] 68  Resp:  [17-18] 18  SpO2:  [90 %-98 %] 92 %  BP: (105-120)/(52-58) 119/57     Weight: 57.6 kg (126 lb 15.8 oz)  Body mass index is 23.99 kg/m².    Intake/Output Summary (Last 24 hours) at 11/27/2022 1350  Last data filed at 11/27/2022 1000  Gross per 24 hour   Intake 300 ml   Output 650 ml   Net -350 ml        Physical Exam  Constitutional:       General: She is not in acute distress.  HENT:      Head: Normocephalic and atraumatic.      Mouth/Throat:      Mouth: Mucous membranes are moist.      Pharynx: Oropharynx is clear.   Eyes:      General: No scleral icterus.  Cardiovascular:      Rate and Rhythm: Normal rate and regular rhythm.      Heart sounds: No murmur heard.    No gallop.   Pulmonary:      Effort: Pulmonary effort is normal.      Breath sounds: Normal breath sounds.   Abdominal:      General: Bowel sounds are normal. There is no distension.      Tenderness: There is no abdominal tenderness.   Musculoskeletal:      Right lower leg: Edema present.      Left lower leg: Edema present.      Comments: 1+ bilateral LE edema   Skin:     General: Skin is warm and dry.   Neurological:      Mental Status: She is alert and oriented to person, place, and time. Mental status is at baseline.   Psychiatric:         Mood and Affect: Mood normal.         Behavior: Behavior normal.       Significant Labs: All pertinent labs within the past 24 hours have been reviewed.  Recent Lab Results  (Last 5 results in the past 24 hours)        11/27/22  1132   11/27/22  0720   11/27/22  0714   11/26/22  1536   11/26/22  1408        Albumin         2.5       Anion Gap   14       14       Baso #   0.02             Basophil %   0.2             BUN   91       94       Calcium   7.4       7.4       Chloride   100       98       CO2   23       22       Creatinine   10.5       9.8       Differential Method   Automated             eGFR   3.5       3.8       Eos #    0.0             Eosinophil %   0.2             Glucose   79       188       Gran # (ANC)   8.6             Gran %   78.0             Hematocrit   27.7             Hemoglobin   9.1             Immature Grans (Abs)   0.17  Comment: Mild elevation in immature granulocytes is non specific and   can be seen in a variety of conditions including stress response,   acute inflammation, trauma and pregnancy. Correlation with other   laboratory and clinical findings is essential.               Immature Granulocytes   1.5             Lymph #   1.5             Lymph %   13.2             Magnesium   1.8             MCH   29.5             MCHC   32.9             MCV   90             Mono #   0.8             Mono %   6.9             MPV   11.8             nRBC   0             Phosphorus   6.2       6.3       Platelets   104             POCT Glucose 115     88   190         Potassium   3.8       4.1       RBC   3.08             RDW   15.1             Sodium   137       134       WBC   10.98                                    Significant Imaging: I have reviewed all pertinent imaging results/findings within the past 24 hours.      Assessment/Plan:      * Microscopic polyangiitis  As of 10/26/22 strongly positive MPO Ab (in contrast to borderline positive PR3), consistent with clinical picture of microscopic polyangiitis with pulmonary, and renal involvement after presenting with acute hypoxemic respiratory failure, hemoptysis, and acute renal failure.  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   3)  CHANGES  SUGGESTIVE OF FOCAL ACUTE PYELONEPHRITIS.   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 Now following PEXIVAS steroid taper. Currently on IV Solumedrol 20 mg daily.   - plan for 20mg IV daily on 11/6 for 14 days (last dose of 20mg equivalent is 11/20/2022).   - apheresis: underwent PLEX 10/26, 10/27, 10/30, 11/1, 11/2, 11/3  - rituximab every 7 days for 4 doses: Dose #1: 10/27, #2 11/3, #3 11/10, and #4 given 11/17  - cyclophosphamide every 14 days for 2 doses: 10/27, 11/10  - Opportunistic Infection ppx: atovaquone 1500mg PO daily  - GI bleed prophylaxis: pantoprazole 40mg daily   11/17 Rheumatology following for steroid dosing and chemotherapeutic agents. on IV steroids due to p.o. intake issues, however can transition to p.o. when swallowing issues reliably resolved - on steroid taper - solumedrol .  rituximab dose due November 17 11/18  switched to prednisone taper by nephrology  11/24: No changes. Having good UOP but Cr still greater than 8. Main concern at this time is whether patient will need to go on dialysis or not. She did get one session two days ago. UOP is improving which is of course encouraging. Has triaylisis line still in place if needed. Nephro following  11/25: No changes, still following BMP and UOP to determine ultimate need for HD. Renal following  11/26: Still no changes, UOP picking up  11/27: Plan for HD tomorrow per nephro, still has left sided trialysis line, and also sodium bicarb and lokelma      Primary pauci-immune necrotizing and crescentic glomerulonephritis  Patient with acute kidney injury  likely due to microscopic polyangiitis WILFREDO is currently stable. Labs reviewed- Renal function/electrolytes with Estimated Creatinine Clearance: 3.8 mL/min (A) (based on SCr of 10.5 mg/dL (H)). according to latest data. Monitor urine output and serial BMP and adjust therapy as needed. Avoid nephrotoxins and renally dose meds for GFR listed above.       Gastric reflux  PPI BID  Elevated HOB; feeds changed to bolus and tolerating well  Symptoms initially improved however reoccurrence on 11/13 without significant dietary changes; GI planning for EGD 11/14  TF further adjusted for smaller, more frequent bolus   s/p EGD 11/14; Normal Study      High risk medications (not anticoagulants) long-term use  as above      Anuria    11/17 Nephrology following for HD and  monitoring for renal recovery with some  improvement in UOP in last 2 days. will need PermCath and HD chair set up if no renal  recovery.     11/24: Still following to determine need for HD although anuria has resolved    Gastrointestinal hemorrhage with melena  Starting having hemoptysis and melena with associated acute blood loss anemia earlier in hospital stay. Patient with known internal hemorrhoids, mild sigmoid diverticulosis, history of gastritis and + H pylori (2012 EGD).   - GI consulted 10/19, unable to perform EGD due to instability and respiratory failure at the time    Plan  - patient unable to take PO PPI due to NGT removal on 11/5, transition to IV PPI 40mg pantoprazole daily, return to per NG tube once replaced  - s/p EGD 11/14; Normal Study  - PPI transitioned to BID as concern for GERD  - Monitor CBC closely for signs of recurrent bleed  11/17 Hb trended dopwn to 6.6. Transfusion with 1 unit of PRBC .  111/8  continue protonix oral     11/25: Hgb trending down today and will need 1 u PRBC. Denies further melena. Will check iron panel, B12, folate, retic count in AM, FOBT, and consider reconsult to GI for consideration of colonoscopy. Also could  be contribution from renal disease. HDS    Dysphagia  SLP following  MBSS showing global weakness in swallowing as well as aspiration with thin liquids.   ENT consulted but patient did not tolerate laryngoscopy     Plan   - Discussed case with Rheumatology team, they are concerned that this dysphagia may have been from prior stroke, requesting imaging for evaluation-  CT demonstrated no acute findings or causes for dysphagia NOR did MRI   - removed NGT and place dobhoff which is more comfortable and makes swallowing easier compared to NGT.    - continue working with speech therapy   -exam concerning for thrush; nystatin swish and swallow initiated; GI consulted as concern that oropharyngeal candidiasis may be contributing to dysphagia  -Per GI, Lower suspicion for esophageal candidiasis without odynophagia however can If white plaques have been seen on oropharynx, ok to start fluconazole empirically for possible esophageal candidiasis  -EGD on 11/14 without abnormality; per GI, Suspect some aspect of esophageal dysmotility in setting of critical illness. No further testing at this time.  -diet initiated per SLP on 11/16; NG tube removed  -continue to monitor p.o. intake closely  11/17 SLP recs Mechanical soft, Liquid Diet Level: Thin   11/22 advanced to renal diet     Moderate malnutrition  Nutrition consulted. Most recent weight and BMI monitored-          Malnutrition (Moderate to Severe)  Weight Loss (Malnutrition): 7.5% in 3 months              Measurements:  Wt Readings from Last 1 Encounters:   11/22/22 57.6 kg (126 lb 15.8 oz)   Body mass index is 23.99 kg/m².    Recommendations: Recommendation/Intervention: 1. Per SLP, remove Dysphagia soft diet restrictions. Continue Renal diet & encourage PO intake as tolerated. 2. If pt agreeable, add Novasource ONS. 3. RD to monitor & follow-up.  Goals: Meet % EEN, EPN by RD f/u date    Patient has been screened and assessed by RD. RD will follow  patient.      Encounter for continuous renal replacement therapy (CRRT) for acute renal failure  Due to microscopic polyangiitis. Remains on hemodialysis intermittently.   - Baseline creatinine 1.0-1.2.   - New onset proteinuria/hematuria.   - Renal biopsy completed and   - Trialysis in place for intermittent Hemodialysis    Plan  - Nephrology consulted, appreciate management  - management of MPA as noted separately  - Renal function panel daily  - renally dose all medications  - intermittent Hemodialysis as indicated, per Nephrology   11/17     Recent Labs   Lab 11/26/22  0303 11/26/22  1408 11/27/22  0720   BUN 90* 94* 91*   CREATININE 10.0* 9.8* 10.5*     . Nephrology following for HD and  monitoring for renal recovery with some  improvement in UOP in last 2 days. will need PermCath and HD chair set up if no renal  recovery.  likely SNF pending nephro decision regarding HD   11/20  mL /24h. BUN/CR trending up to 113/9.8 , started on sodium bicarb for bicarb 19. continue lokelma 5g daily for 5.1   11/23 HD yesterday without ultrafiltration . UOP 500ml/24h   11/24: UOP 1100 cc in 24hr  11/25: So far today urinated 400cc by about 11am  11/26: Still monitoring for RRT needs  11/27: HD tomorrow, starting sodium bicarb, giving lokelma today    Acute hypoxemic respiratory failure  Multifocal pneumonia  Hemoptysis  Dyspnea  Diffuse Alveolar Hemorrahge  In the setting of a new diagnosis of microscopic polyangiitis, presented with fevers, dyspnea,.  - Chest imaging was consistent with diffuse alveolar hemorrhage.  CT chest wc 10/17 with bl perihilar dense consolidations w/ peripheral patcy areas of GGO, BL PNA, less c/f cardiogenic pulmonary edema.  - Patient upgraded to Medical ICU 10/23/22 with abrupt respiratory decline requiring NIPPV, improved with high dose IV steroids, plasmapheresis, emergent hemodialysis.   - Unable to perform bronchoscopy in the Medical ICU due to tenuous respiratory status  - As of 11/6,  hypoxemia has significantly improved    Plan:  - weaned to room air  - continue management of underlying triggers with steroid and immunosuppressants  - incentive spirometry and respiratory hygiene  11/17 sats 92% on RA  11/25: Has been stable on RA for extended period at this time    Lymphadenopathy of head and neck  - Has bilateral neck gland swelling with tenderness,consulted ENT  - No surgical intervention indicated   - Adenopathy likely reactive in nature   - Recommend further workup if it does not resolve within next 2 weeks     Hyponatremia  Has hyponatremia,started on NS. was given 2 grams of sodium chloride tablets TID for likely SIADH  11/18  sodium normalized at 137. salt tablets discontinued.      Other specified anemias  In the setting of GI bleed with melena  - Evaluated by GI, see GI bleed documentation  - Last transfusion 10/28, Hgb slowly trending down since then  - Hgb 6.9 on 11/5      Plan  - Monitor CBC Daily   - Treat with pRBC transfusion PRN Hgb <7  - qualifies for transfusion on 11/5 with Hgb 6.9, 1u pRBC ordered  -stable   11/17 Hb trended dopwn to 6.6. Transfusion with 1 unit of PRBC .consider GI eval  11/24: Stable Hgb and vitals  11/25: Hgb trending down today and will need 1 u PRBC. Denies further melena. Will check iron panel, B12, folate, retic count in AM, FOBT, and consider reconsult to GI for consideration of colonoscopy. Also could be contribution from renal disease. HDS  11/26: Still no stool guaic as she has not stooled but reports brown stool yesterday without melena for a while now. Iron panel c/w anemia of chronic inflammation. Likely also hypoproliferative in the setting of renal disease. Responded well to 1U PRBC yesterday. Think ongoing GIB is unlikely at this time. Will continue to follow  11/27: Hgb up to 9.1 today from 8.6    Current CBC reviewed-    Recent Labs   Lab 11/25/22  0255 11/26/22  0302 11/27/22  0720   HGB 6.5* 8.6* 9.1*       Chronic diastolic heart  failure  Pulmonary Hypertension due to left heart disease  TTE (10/2022) EF 65%, G1DD  Home meds: Lisinopril, Toprol        Plan  - Active volume control with intermittent Hemodialysis per Nephrology   - Daily weights (standing if tolerated)  - Strict I/Os  - Fluid restriction 1.5 day  - Cardiac diet w/ 2 g Na restriction    11/25: Does have LE edema as mentioned but more likely secondary to renal failure than heart failure and will continue current management. On room air breathing comfortably      Hyperkalemia    resolved    Primary hypertension  BP Readings from Last 1 Encounters:   11/27/22 (!) 119/57     - Patient is unable to tolerate PO mediations, all meds to be administered via NG tube  -Will continue to monitor blood pressure trend closely and adjust antihypertensive regimen as clinically indicated and tolerated.    amLODIPine tablet 10 mg, 10 mg, Per NG tube, Daily    carvediloL tablet 12.5 mg, 12.5 mg, Per NG tube, BID    hydrALAZINE tablet 25 mg, 25 mg, Per NG tube, Q8H    lisinopriL tablet 40 mg, 40 mg, Per NG tube, Daily    11/24: NG tube has been removed and now taking medications by mouth  11/26: /56, continue current management  11/27: No changes      Hypothyroid  - Continue synthroid 25 mcg  - TSH 0.585 10/27/22      VTE Risk Mitigation (From admission, onward)         Ordered     heparin (porcine) injection 1,000 Units  As needed (PRN)         11/22/22 0832     heparin, porcine (PF) 100 unit/mL injection flush 500 Units  As needed (PRN)         11/17/22 1343     heparin, porcine (PF) 100 unit/mL injection flush 500 Units  As needed (PRN)         11/10/22 1430     heparin (porcine) injection 1,000 Units  As needed (PRN)         11/09/22 1601     heparin (porcine) injection 1,000 Units  As needed (PRN)         11/08/22 0854     Place sequential compression device  Until discontinued         10/25/22 1731     IP VTE HIGH RISK PATIENT  Once         10/17/22 1354     Reason for No  Pharmacological VTE Prophylaxis  Once        Question:  Reasons:  Answer:  Risk of Bleeding    10/17/22 1354                Discharge Planning   ANTHONY: 11/29/2022     Code Status: Full Code   Is the patient medically ready for discharge?: No    Reason for patient still in hospital (select all that apply): Treatment  Discharge Plan A: Skilled Nursing Facility   Discharge Delays: None known at this time              Ej Fregoso MD  Department of Hospital Medicine   [facility] ([place])

## 2022-11-27 NOTE — ASSESSMENT & PLAN NOTE
Due to microscopic polyangiitis. Remains on hemodialysis intermittently.   - Baseline creatinine 1.0-1.2.   - New onset proteinuria/hematuria.   - Renal biopsy completed and   - Trialysis in place for intermittent Hemodialysis    Plan  - Nephrology consulted, appreciate management  - management of MPA as noted separately  - Renal function panel daily  - renally dose all medications  - intermittent Hemodialysis as indicated, per Nephrology   11/17     Recent Labs   Lab 11/26/22  0303 11/26/22  1408 11/27/22  0720   BUN 90* 94* 91*   CREATININE 10.0* 9.8* 10.5*     . Nephrology following for HD and  monitoring for renal recovery with some  improvement in UOP in last 2 days. will need PermCath and HD chair set up if no renal  recovery.  likely SNF pending nephro decision regarding HD   11/20  mL /24h. BUN/CR trending up to 113/9.8 , started on sodium bicarb for bicarb 19. continue lokelma 5g daily for 5.1   11/23 HD yesterday without ultrafiltration . UOP 500ml/24h   11/24: UOP 1100 cc in 24hr  11/25: So far today urinated 400cc by about 11am  11/26: Still monitoring for RRT needs  11/27: HD tomorrow, starting sodium bicarb, giving lokelma today

## 2022-11-27 NOTE — ASSESSMENT & PLAN NOTE
In the setting of GI bleed with melena  - Evaluated by GI, see GI bleed documentation  - Last transfusion 10/28, Hgb slowly trending down since then  - Hgb 6.9 on 11/5      Plan  - Monitor CBC Daily   - Treat with pRBC transfusion PRN Hgb <7  - qualifies for transfusion on 11/5 with Hgb 6.9, 1u pRBC ordered  -stable   11/17 Hb trended dopwn to 6.6. Transfusion with 1 unit of PRBC .consider GI eval  11/24: Stable Hgb and vitals  11/25: Hgb trending down today and will need 1 u PRBC. Denies further melena. Will check iron panel, B12, folate, retic count in AM, FOBT, and consider reconsult to GI for consideration of colonoscopy. Also could be contribution from renal disease. HDS  11/26: Still no stool guaic as she has not stooled but reports brown stool yesterday without melena for a while now. Iron panel c/w anemia of chronic inflammation. Likely also hypoproliferative in the setting of renal disease. Responded well to 1U PRBC yesterday. Think ongoing GIB is unlikely at this time. Will continue to follow  11/27: Hgb up to 9.1 today from 8.6    Current CBC reviewed-    Recent Labs   Lab 11/25/22  0255 11/26/22  0302 11/27/22  0720   HGB 6.5* 8.6* 9.1*

## 2022-11-27 NOTE — ASSESSMENT & PLAN NOTE
"Patient with baseline creatinine of 1 as recent as August 2022 with progressive worsening beginning in early October 1.3 (10/13)->1.6 (10/17)->3.0 (10/23) despite IV fluids.  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  Patient noted to have new onset proteinuria and hematuria over the last 1 month.  Additionally, would consider ATN in the setting of suspected sepsis from multifocal pneumonia.      Concerns for glomerulonephritis given patient's current clinical picture. Initial urine microscopy demonstrating muddy brown casts with likely acanthocytes noted as well. MITUL positive, proteinase 3 ab weakly positive, MPO strongly positive. Patient s/p high-dose IV solumedrol x3 doses, now on Solumedrol 50mg qd. Underwent kidney bx 10/27. Rheumatology consulted, and patient started on Plex, Ritux/cytoxan. Given continually worsening renal function and uremic encephalopathy, patient underwent SLED without complication on 10/27. Transferred to floor on 10/29. Significantly improved mentation on 10/31. Underwent HD 11/1. Patient also with increasing hypernatremia so added FWF to tube feeds.      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 Second dose Ritux on 11/3. Next dose of cytoxan on 11/10. Will discuss with Rheum regarding maintenance dosing.      Recommendations:  - No urgent indication for HD today. No signs of uremic encephalopathy or increasing O2 requirements. Will likely undergo HD tomorrow.   - No need for HD today, will continue to monitor  - Agree with lokelma 5 mg TID today. Can schedule daily 5mg if K continues to rise  - continue solumedrol 20 mg qd until 11/20, then 15 for 14 days, then 12.5 for 14 days, then 10 for 14 days, then 7.5 for 14 days then possibly life long 5 mg. " (per PEXIVAS trial)  - Start NaHCO3 650 BID  - strict intake and output  - renally dose medications and avoid nephrotoxins when possible  - replete electrolytes as appropriate

## 2022-11-27 NOTE — SUBJECTIVE & OBJECTIVE
Interval History: Patient evaluated at bedside. No acute concerns. Cr 9.8 --> 10.5. Last HD 11/22.     Review of patient's allergies indicates:   Allergen Reactions    Ampicillin     Peaches [peach (prunus persica)] Other (See Comments)     Pt unable to state type of reaction. Information obtained from daughter who states she was informed of allergy from patient.    Penicillins      Other reaction(s): Hives    Sulfa (sulfonamide antibiotics) Rash and Hives     Current Facility-Administered Medications   Medication Frequency    0.9%  NaCl infusion (for blood administration) Q24H PRN    0.9%  NaCl infusion (for blood administration) Q24H PRN    0.9%  NaCl infusion (for blood administration) Q24H PRN    0.9%  NaCl infusion Once    acetaminophen tablet 650 mg Q4H PRN    albuterol-ipratropium 2.5 mg-0.5 mg/3 mL nebulizer solution 3 mL Q4H PRN    aluminum-magnesium hydroxide-simethicone 200-200-20 mg/5 mL suspension 30 mL QID PRN    aluminum-magnesium hydroxide-simethicone 200-200-20 mg/5 mL suspension 30 mL Once    And    LIDOcaine HCl 2% oral solution 15 mL Once    amLODIPine tablet 10 mg Daily    atovaquone 750 mg/5 mL oral liquid 1,500 mg Daily    bisacodyL suppository 10 mg Daily PRN    carvediloL tablet 25 mg BID    cloNIDine tablet 0.1 mg Q6H PRN    dextrose 10% bolus 125 mL PRN    dextrose 10% bolus 250 mL PRN    epoetin hira injection 2,880 Units Every Mon, Wed, Fri    glucagon (human recombinant) injection 1 mg PRN    glucose chewable tablet 16 g PRN    glucose chewable tablet 24 g PRN    heparin (porcine) injection 1,000 Units PRN    heparin (porcine) injection 1,000 Units PRN    heparin, porcine (PF) 100 unit/mL injection flush 500 Units PRN    heparin, porcine (PF) 100 unit/mL injection flush 500 Units PRN    hydrALAZINE tablet 100 mg Q8H    insulin aspart U-100 pen 1-10 Units Q6H PRN    levothyroxine tablet 25 mcg Before breakfast    melatonin tablet 6 mg Nightly PRN    methocarbamoL tablet 500 mg TID PRN     metoclopramide HCl injection 5 mg Q6H PRN    naloxone 0.4 mg/mL injection 0.02 mg PRN    nystatin 100,000 unit/mL suspension 500,000 Units QID    ondansetron injection 4 mg Q8H PRN    pantoprazole EC tablet 40 mg BID AC    predniSONE tablet 20 mg Daily    Followed by    [START ON 12/4/2022] predniSONE tablet 12.5 mg Daily    Followed by    [START ON 12/18/2022] predniSONE tablet 10 mg Daily    Followed by    [START ON 1/1/2023] predniSONE tablet 5 mg Daily    prochlorperazine injection Soln 5 mg Q6H PRN    QUEtiapine tablet 25 mg QHS    simethicone chewable tablet 80 mg QID PRN    sodium bicarbonate tablet 650 mg BID    sodium chloride 0.9% 250 mL flush bag 1 time in Clinic/HOD    sodium chloride 0.9% bolus 250 mL PRN    sodium chloride 0.9% flush 10 mL PRN    sodium chloride 0.9% flush 10 mL PRN    sodium chloride 0.9% flush 10 mL PRN    sodium chloride 0.9% flush 10 mL PRN    sodium chloride 0.9% flush 5 mL PRN     Facility-Administered Medications Ordered in Other Encounters   Medication Frequency    celecoxib capsule 400 mg Once    fentaNYL 50 mcg/mL injection  mcg PRN    LIDOcaine (PF) 10 mg/ml (1%) injection 10 mg Once PRN    LIDOcaine (PF) 10 mg/ml (1%) injection 10 mg Once    midazolam (VERSED) 1 mg/mL injection 0.5-4 mg PRN    ropivacaine 0.2% [facility] PainPRO Pump infusion 500 ML Continuous       Objective:     Vital Signs (Most Recent):  Temp: 98.2 °F (36.8 °C) (11/27/22 1200)  Pulse: 68 (11/27/22 1200)  Resp: 18 (11/27/22 1200)  BP: (!) 119/57 (11/27/22 1200)  SpO2: (!) 92 % (11/27/22 1200)  O2 Device (Oxygen Therapy): room air (11/26/22 1515)   Vital Signs (24h Range):  Temp:  [96.5 °F (35.8 °C)-99 °F (37.2 °C)] 98.2 °F (36.8 °C)  Pulse:  [64-68] 68  Resp:  [17-18] 18  SpO2:  [90 %-98 %] 92 %  BP: (105-120)/(52-58) 119/57     Weight: 57.6 kg (126 lb 15.8 oz) (11/22/22 1328)  Body mass index is 23.99 kg/m².  Body surface area is 1.57 meters squared.    I/O last 3 completed shifts:  In: 300  [P.O.:300]  Out: 300 [Urine:300]    Physical Exam  Vitals and nursing note reviewed.   Constitutional:       General: She is awake.      Appearance: She is well-developed. She is ill-appearing. She is not toxic-appearing or diaphoretic.   HENT:      Head: Normocephalic and atraumatic.      Right Ear: External ear normal.      Left Ear: External ear normal.      Nose:      Comments: + NGT     Mouth/Throat:      Mouth: Mucous membranes are dry.   Eyes:      General: Lids are normal. No scleral icterus.        Right eye: No discharge.         Left eye: No discharge.   Cardiovascular:      Rate and Rhythm: Normal rate and regular rhythm.   Pulmonary:      Effort: Pulmonary effort is normal.      Breath sounds: Normal breath sounds. No wheezing or rhonchi.   Abdominal:      General: Bowel sounds are normal.      Palpations: Abdomen is soft.      Tenderness: There is no abdominal tenderness.   Musculoskeletal:         General: No deformity.      Cervical back: Normal range of motion and neck supple. No rigidity or tenderness.      Right lower leg: No edema.      Left lower leg: No edema.   Skin:     General: Skin is warm and dry.   Neurological:      General: No focal deficit present.      Mental Status: She is alert and oriented to person, place, and time. Mental status is at baseline.   Psychiatric:         Attention and Perception: Attention normal.         Behavior: Behavior normal.       Significant Labs:  CBC:   Recent Labs   Lab 11/27/22  0720   WBC 10.98   RBC 3.08*   HGB 9.1*   HCT 27.7*   *   MCV 90   MCH 29.5   MCHC 32.9     CMP:   Recent Labs   Lab 11/26/22  1408 11/27/22  0720   * 79   CALCIUM 7.4* 7.4*   ALBUMIN 2.5*  --    * 137   K 4.1 3.8   CO2 22* 23   CL 98 100   BUN 94* 91*   CREATININE 9.8* 10.5*     All labs within the past 24 hours have been reviewed.

## 2022-11-27 NOTE — PROGRESS NOTES
[facility] - [facility] ([place] [address])  Nephrology  Progress Note    Patient Name: Kristin Goodman  MRN: [id number]  Admission Date: 10/17/2022  Hospital Length of Stay: 41 days  Attending Provider: Ej Fregoso MD   Primary Care Physician: Lori Hernandez MD  Principal Problem:Microscopic polyangiitis    Subjective:     HPI: Kristin Goodman is a 77 year old female with hypertension, hypothyroidism, HFpEF who presents for cough, shortness of breath, and weakness.  Patient initially presented to ER on 09/24 with hemoptysis and imaging concerning for multilobar pneumonia at which time she was discharged on a 10 day course of levofloxacin.  Patient initially had some improvement but began having worsening symptoms on 10/14.  Patient describes multiple fevers and progressive shortness of breath.  She continues to have intermittent hemoptysis.  Patient with worsening leukocytosis and limited clinical improvement despite broad-spectrum antibiotics.  She remains on cefepime.  Patient is a noted to have baseline creatinine of 1 as recent as 8/2022 but progressive worsening of creatinine in early October.  On admission patient with creatinine of 1.6 (10/17) with subsequent progression and creatinine of 3.0 at time of consultation (10/23).  Nephrology consulted for acute kidney injury.      Interval History: Patient evaluated at bedside. No acute concerns. Cr 9.8 --> 10.5. Last HD 11/22.     Review of patient's allergies indicates:   Allergen Reactions    Ampicillin     Peaches [peach (prunus persica)] Other (See Comments)     Pt unable to state type of reaction. Information obtained from daughter who states she was informed of allergy from patient.    Penicillins      Other reaction(s): Hives    Sulfa (sulfonamide antibiotics) Rash and Hives     Current Facility-Administered Medications   Medication Frequency    0.9%  NaCl infusion (for blood administration) Q24H PRN    0.9%  NaCl infusion (for blood administration) Q24H  PRN    0.9%  NaCl infusion (for blood administration) Q24H PRN    0.9%  NaCl infusion Once    acetaminophen tablet 650 mg Q4H PRN    albuterol-ipratropium 2.5 mg-0.5 mg/3 mL nebulizer solution 3 mL Q4H PRN    aluminum-magnesium hydroxide-simethicone 200-200-20 mg/5 mL suspension 30 mL QID PRN    aluminum-magnesium hydroxide-simethicone 200-200-20 mg/5 mL suspension 30 mL Once    And    LIDOcaine HCl 2% oral solution 15 mL Once    amLODIPine tablet 10 mg Daily    atovaquone 750 mg/5 mL oral liquid 1,500 mg Daily    bisacodyL suppository 10 mg Daily PRN    carvediloL tablet 25 mg BID    cloNIDine tablet 0.1 mg Q6H PRN    dextrose 10% bolus 125 mL PRN    dextrose 10% bolus 250 mL PRN    epoetin hira injection 2,880 Units Every Mon, Wed, Fri    glucagon (human recombinant) injection 1 mg PRN    glucose chewable tablet 16 g PRN    glucose chewable tablet 24 g PRN    heparin (porcine) injection 1,000 Units PRN    heparin (porcine) injection 1,000 Units PRN    heparin, porcine (PF) 100 unit/mL injection flush 500 Units PRN    heparin, porcine (PF) 100 unit/mL injection flush 500 Units PRN    hydrALAZINE tablet 100 mg Q8H    insulin aspart U-100 pen 1-10 Units Q6H PRN    levothyroxine tablet 25 mcg Before breakfast    melatonin tablet 6 mg Nightly PRN    methocarbamoL tablet 500 mg TID PRN    metoclopramide HCl injection 5 mg Q6H PRN    naloxone 0.4 mg/mL injection 0.02 mg PRN    nystatin 100,000 unit/mL suspension 500,000 Units QID    ondansetron injection 4 mg Q8H PRN    pantoprazole EC tablet 40 mg BID AC    predniSONE tablet 20 mg Daily    Followed by    [START ON 12/4/2022] predniSONE tablet 12.5 mg Daily    Followed by    [START ON 12/18/2022] predniSONE tablet 10 mg Daily    Followed by    [START ON 1/1/2023] predniSONE tablet 5 mg Daily    prochlorperazine injection Soln 5 mg Q6H PRN    QUEtiapine tablet 25 mg QHS    simethicone chewable tablet 80 mg QID PRN    sodium bicarbonate tablet 650 mg BID    sodium chloride  0.9% 250 mL flush bag 1 time in Clinic/HOD    sodium chloride 0.9% bolus 250 mL PRN    sodium chloride 0.9% flush 10 mL PRN    sodium chloride 0.9% flush 10 mL PRN    sodium chloride 0.9% flush 10 mL PRN    sodium chloride 0.9% flush 10 mL PRN    sodium chloride 0.9% flush 5 mL PRN     Facility-Administered Medications Ordered in Other Encounters   Medication Frequency    celecoxib capsule 400 mg Once    fentaNYL 50 mcg/mL injection  mcg PRN    LIDOcaine (PF) 10 mg/ml (1%) injection 10 mg Once PRN    LIDOcaine (PF) 10 mg/ml (1%) injection 10 mg Once    midazolam (VERSED) 1 mg/mL injection 0.5-4 mg PRN    ropivacaine 0.2% [facility] PainPRO Pump infusion 500 ML Continuous       Objective:     Vital Signs (Most Recent):  Temp: 98.2 °F (36.8 °C) (11/27/22 1200)  Pulse: 68 (11/27/22 1200)  Resp: 18 (11/27/22 1200)  BP: (!) 119/57 (11/27/22 1200)  SpO2: (!) 92 % (11/27/22 1200)  O2 Device (Oxygen Therapy): room air (11/26/22 1515)   Vital Signs (24h Range):  Temp:  [96.5 °F (35.8 °C)-99 °F (37.2 °C)] 98.2 °F (36.8 °C)  Pulse:  [64-68] 68  Resp:  [17-18] 18  SpO2:  [90 %-98 %] 92 %  BP: (105-120)/(52-58) 119/57     Weight: 57.6 kg (126 lb 15.8 oz) (11/22/22 1328)  Body mass index is 23.99 kg/m².  Body surface area is 1.57 meters squared.    I/O last 3 completed shifts:  In: 300 [P.O.:300]  Out: 300 [Urine:300]    Physical Exam  Vitals and nursing note reviewed.   Constitutional:       General: She is awake.      Appearance: She is well-developed. She is ill-appearing. She is not toxic-appearing or diaphoretic.   HENT:      Head: Normocephalic and atraumatic.      Right Ear: External ear normal.      Left Ear: External ear normal.      Nose:      Comments: + NGT     Mouth/Throat:      Mouth: Mucous membranes are dry.   Eyes:      General: Lids are normal. No scleral icterus.        Right eye: No discharge.         Left eye: No discharge.   Cardiovascular:      Rate and Rhythm: Normal rate and regular rhythm.   Pulmonary:  "     Effort: Pulmonary effort is normal.      Breath sounds: Normal breath sounds. No wheezing or rhonchi.   Abdominal:      General: Bowel sounds are normal.      Palpations: Abdomen is soft.      Tenderness: There is no abdominal tenderness.   Musculoskeletal:         General: No deformity.      Cervical back: Normal range of motion and neck supple. No rigidity or tenderness.      Right lower leg: No edema.      Left lower leg: No edema.   Skin:     General: Skin is warm and dry.   Neurological:      General: No focal deficit present.      Mental Status: She is alert and oriented to person, place, and time. Mental status is at baseline.   Psychiatric:         Attention and Perception: Attention normal.         Behavior: Behavior normal.       Significant Labs:  CBC:   Recent Labs   Lab 11/27/22  0720   WBC 10.98   RBC 3.08*   HGB 9.1*   HCT 27.7*   *   MCV 90   MCH 29.5   MCHC 32.9     CMP:   Recent Labs   Lab 11/26/22  1408 11/27/22  0720   * 79   CALCIUM 7.4* 7.4*   ALBUMIN 2.5*  --    * 137   K 4.1 3.8   CO2 22* 23   CL 98 100   BUN 94* 91*   CREATININE 9.8* 10.5*     All labs within the past 24 hours have been reviewed.       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  Patient noted to have new onset proteinuria and hematuria over the last 1 month.  Additionally, would consider ATN in the setting of suspected sepsis from multifocal pneumonia.      Concerns for glomerulonephritis given patient's current clinical picture. Initial urine microscopy " "demonstrating muddy brown casts with likely acanthocytes noted as well. MITUL positive, proteinase 3 ab weakly positive, MPO strongly positive. Patient s/p high-dose IV solumedrol x3 doses, now on Solumedrol 50mg qd. Underwent kidney bx 10/27. Rheumatology consulted, and patient started on Plex, Ritux/cytoxan. Given continually worsening renal function and uremic encephalopathy, patient underwent SLED without complication on 10/27. Transferred to floor on 10/29. Significantly improved mentation on 10/31. Underwent HD 11/1. Patient also with increasing hypernatremia so added FWF to tube feeds.      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 Second dose Ritux on 11/3. Next dose of cytoxan on 11/10. Will discuss with Rheum regarding maintenance dosing.      Recommendations:  - No urgent indication for HD today. No signs of uremic encephalopathy or increasing O2 requirements. Will likely undergo HD tomorrow.   - No need for HD today, will continue to monitor  - Agree with lokelma 5 mg TID today. Can schedule daily 5mg if K continues to rise  - continue solumedrol 20 mg qd until 11/20, then 15 for 14 days, then 12.5 for 14 days, then 10 for 14 days, then 7.5 for 14 days then possibly life long 5 mg. (per PEXIVAS trial)  - Start NaHCO3 650 BID  - strict intake and output  - renally dose medications and avoid nephrotoxins when possible  - replete electrolytes as appropriate    Hyponatremia  Resolved           Danielle Chang MD  Nephrology  [facility] - Intensive Care ([place] [address])    ATTENDING PHYSICIAN ATTESTATION  I have personally verified the history and examined the patient. I thoroughly reviewed the demographic, clinical, laboratorial and imaging information available in medical records. I agree with the assessment and recommendations provided by the " subspecialty resident who was under my supervision.

## 2022-11-27 NOTE — ASSESSMENT & PLAN NOTE
BP Readings from Last 1 Encounters:   11/27/22 (!) 119/57     - Patient is unable to tolerate PO mediations, all meds to be administered via NG tube  -Will continue to monitor blood pressure trend closely and adjust antihypertensive regimen as clinically indicated and tolerated.    amLODIPine tablet 10 mg, 10 mg, Per NG tube, Daily    carvediloL tablet 12.5 mg, 12.5 mg, Per NG tube, BID    hydrALAZINE tablet 25 mg, 25 mg, Per NG tube, Q8H    lisinopriL tablet 40 mg, 40 mg, Per NG tube, Daily    11/24: NG tube has been removed and now taking medications by mouth  11/26: /56, continue current management  11/27: No changes

## 2022-11-27 NOTE — ASSESSMENT & PLAN NOTE
Patient with acute kidney injury likely due to microscopic polyangiitis WILFREDO is currently stable. Labs reviewed- Renal function/electrolytes with Estimated Creatinine Clearance: 3.8 mL/min (A) (based on SCr of 10.5 mg/dL (H)). according to latest data. Monitor urine output and serial BMP and adjust therapy as needed. Avoid nephrotoxins and renally dose meds for GFR listed above.

## 2022-11-28 LAB
ANION GAP SERPL CALC-SCNC: 16 MMOL/L (ref 8–16)
BASOPHILS # BLD AUTO: 0.02 K/UL (ref 0–0.2)
BASOPHILS NFR BLD: 0.2 % (ref 0–1.9)
BUN SERPL-MCNC: 100 MG/DL (ref 8–23)
CALCIUM SERPL-MCNC: 7.4 MG/DL (ref 8.7–10.5)
CHLORIDE SERPL-SCNC: 98 MMOL/L (ref 95–110)
CO2 SERPL-SCNC: 25 MMOL/L (ref 23–29)
CREAT SERPL-MCNC: 10.5 MG/DL (ref 0.5–1.4)
DIFFERENTIAL METHOD: ABNORMAL
EOSINOPHIL # BLD AUTO: 0 K/UL (ref 0–0.5)
EOSINOPHIL NFR BLD: 0.1 % (ref 0–8)
ERYTHROCYTE [DISTWIDTH] IN BLOOD BY AUTOMATED COUNT: 15 % (ref 11.5–14.5)
EST. GFR  (NO RACE VARIABLE): 3.5 ML/MIN/1.73 M^2
GLUCOSE SERPL-MCNC: 81 MG/DL (ref 70–110)
HCT VFR BLD AUTO: 26.3 % (ref 37–48.5)
HGB BLD-MCNC: 8.2 G/DL (ref 12–16)
IMM GRANULOCYTES # BLD AUTO: 0.13 K/UL (ref 0–0.04)
IMM GRANULOCYTES NFR BLD AUTO: 1.4 % (ref 0–0.5)
LYMPHOCYTES # BLD AUTO: 1.3 K/UL (ref 1–4.8)
LYMPHOCYTES NFR BLD: 14.5 % (ref 18–48)
MAGNESIUM SERPL-MCNC: 1.8 MG/DL (ref 1.6–2.6)
MCH RBC QN AUTO: 28.5 PG (ref 27–31)
MCHC RBC AUTO-ENTMCNC: 31.2 G/DL (ref 32–36)
MCV RBC AUTO: 91 FL (ref 82–98)
MONOCYTES # BLD AUTO: 0.7 K/UL (ref 0.3–1)
MONOCYTES NFR BLD: 7.2 % (ref 4–15)
NEUTROPHILS # BLD AUTO: 6.9 K/UL (ref 1.8–7.7)
NEUTROPHILS NFR BLD: 76.6 % (ref 38–73)
NRBC BLD-RTO: 0 /100 WBC
PHOSPHATE SERPL-MCNC: 6.6 MG/DL (ref 2.7–4.5)
PLATELET # BLD AUTO: 102 K/UL (ref 150–450)
PMV BLD AUTO: 12.2 FL (ref 9.2–12.9)
POCT GLUCOSE: 101 MG/DL (ref 70–110)
POCT GLUCOSE: 110 MG/DL (ref 70–110)
POCT GLUCOSE: 168 MG/DL (ref 70–110)
POCT GLUCOSE: 175 MG/DL (ref 70–110)
POTASSIUM SERPL-SCNC: 3.7 MMOL/L (ref 3.5–5.1)
RBC # BLD AUTO: 2.88 M/UL (ref 4–5.4)
SODIUM SERPL-SCNC: 139 MMOL/L (ref 136–145)
WBC # BLD AUTO: 8.99 K/UL (ref 3.9–12.7)

## 2022-11-28 PROCEDURE — 99232 SBSQ HOSP IP/OBS MODERATE 35: CPT | Mod: ,,, | Performed by: STUDENT IN AN ORGANIZED HEALTH CARE EDUCATION/TRAINING PROGRAM

## 2022-11-28 PROCEDURE — 83735 ASSAY OF MAGNESIUM: CPT

## 2022-11-28 PROCEDURE — 99232 PR SUBSEQUENT HOSPITAL CARE,LEVL II: ICD-10-PCS | Mod: ,,, | Performed by: STUDENT IN AN ORGANIZED HEALTH CARE EDUCATION/TRAINING PROGRAM

## 2022-11-28 PROCEDURE — 97116 GAIT TRAINING THERAPY: CPT

## 2022-11-28 PROCEDURE — 25000003 PHARM REV CODE 250: Performed by: HOSPITALIST

## 2022-11-28 PROCEDURE — 97110 THERAPEUTIC EXERCISES: CPT

## 2022-11-28 PROCEDURE — 80048 BASIC METABOLIC PNL TOTAL CA: CPT | Performed by: STUDENT IN AN ORGANIZED HEALTH CARE EDUCATION/TRAINING PROGRAM

## 2022-11-28 PROCEDURE — 25000003 PHARM REV CODE 250: Performed by: STUDENT IN AN ORGANIZED HEALTH CARE EDUCATION/TRAINING PROGRAM

## 2022-11-28 PROCEDURE — 63600175 PHARM REV CODE 636 W HCPCS: Performed by: STUDENT IN AN ORGANIZED HEALTH CARE EDUCATION/TRAINING PROGRAM

## 2022-11-28 PROCEDURE — 25000003 PHARM REV CODE 250

## 2022-11-28 PROCEDURE — 85025 COMPLETE CBC W/AUTO DIFF WBC: CPT | Performed by: STUDENT IN AN ORGANIZED HEALTH CARE EDUCATION/TRAINING PROGRAM

## 2022-11-28 PROCEDURE — 20600001 HC STEP DOWN PRIVATE ROOM

## 2022-11-28 PROCEDURE — 84100 ASSAY OF PHOSPHORUS: CPT

## 2022-11-28 PROCEDURE — 36415 COLL VENOUS BLD VENIPUNCTURE: CPT

## 2022-11-28 PROCEDURE — 63600175 PHARM REV CODE 636 W HCPCS: Mod: JG | Performed by: INTERNAL MEDICINE

## 2022-11-28 RX ADMIN — ATOVAQUONE 1500 MG: 750 SUSPENSION ORAL at 09:11

## 2022-11-28 RX ADMIN — NYSTATIN 500000 UNITS: 500000 SUSPENSION ORAL at 09:11

## 2022-11-28 RX ADMIN — SODIUM ZIRCONIUM CYCLOSILICATE 5 G: 5 POWDER, FOR SUSPENSION ORAL at 09:11

## 2022-11-28 RX ADMIN — CARVEDILOL 25 MG: 25 TABLET, FILM COATED ORAL at 09:11

## 2022-11-28 RX ADMIN — ERYTHROPOIETIN 2880 UNITS: 3000 INJECTION, SOLUTION INTRAVENOUS; SUBCUTANEOUS at 09:11

## 2022-11-28 RX ADMIN — HYDRALAZINE HYDROCHLORIDE 100 MG: 50 TABLET ORAL at 06:11

## 2022-11-28 RX ADMIN — PREDNISONE 20 MG: 20 TABLET ORAL at 09:11

## 2022-11-28 RX ADMIN — SODIUM BICARBONATE 650 MG TABLET 650 MG: at 09:11

## 2022-11-28 RX ADMIN — AMLODIPINE BESYLATE 10 MG: 10 TABLET ORAL at 09:11

## 2022-11-28 RX ADMIN — PANTOPRAZOLE SODIUM 40 MG: 40 TABLET, DELAYED RELEASE ORAL at 05:11

## 2022-11-28 RX ADMIN — LEVOTHYROXINE SODIUM 25 MCG: 25 TABLET ORAL at 06:11

## 2022-11-28 RX ADMIN — QUETIAPINE FUMARATE 25 MG: 25 TABLET ORAL at 09:11

## 2022-11-28 RX ADMIN — NYSTATIN 500000 UNITS: 500000 SUSPENSION ORAL at 02:11

## 2022-11-28 RX ADMIN — PANTOPRAZOLE SODIUM 40 MG: 40 TABLET, DELAYED RELEASE ORAL at 06:11

## 2022-11-28 RX ADMIN — HYDRALAZINE HYDROCHLORIDE 100 MG: 50 TABLET ORAL at 09:11

## 2022-11-28 NOTE — PT/OT/SLP PROGRESS
"Physical Therapy Treatment    Patient Name:  Kristin Goodman   MRN:  [id number]    Recommendations:     Discharge Recommendations:  nursing facility, skilled   Discharge Equipment Recommendations: bedside commode   Barriers to discharge: Pt currently requiring increased level of assistance with mobility      Assessment:     Kristin Goodman is a 77 y.o. female admitted with a medical diagnosis of Microscopic polyangiitis.  She presents with the following impairments/functional limitations:  weakness, impaired endurance, impaired functional mobility, gait instability, impaired balance, decreased lower extremity function, edema. Pt appeared sad, requiring increased reinforcement for participation. Pt amb 10' + 15' RW CGA with prolong seated rest break in between. Pt continues to benefit from skilled PT services while in house in order to address the aforementioned deficits.      Rehab Prognosis: Good; patient would benefit from acute skilled PT services to address these deficits and reach maximum level of function.    Recent Surgery: Procedure(s) (LRB):  EGD (ESOPHAGOGASTRODUODENOSCOPY) (N/A) 14 Days Post-Op    Plan:     During this hospitalization, patient to be seen 3 x/week to address the identified rehab impairments via gait training, therapeutic activities, therapeutic exercises, neuromuscular re-education and progress toward the following goals:    Plan of Care Expires:  11/30/22    Subjective     "The fish is dry"    Pain/Comfort:  Pain Rating 1: 0/10  Pain Rating Post-Intervention 1: 0/10      Objective:     Communicated with RN prior to session.  Patient found up in chair with  (no lines intact) upon PT entry to room.     General Precautions: Standard, fall   Orthopedic Precautions:N/A   Braces: N/A  Respiratory Status: Room air     Functional Mobility:  Transfers:     Sit to Stand:    From bedside chair: moderate assistance with rolling walker  From toilet: maximal assistance with rolling walker  Bed to Chair: " contact guard assistance with  rolling walker  using  Step Transfer  Gait: Pt amb 10' + 15' RW CGA with prolong seated rest break in between bouts. Pt presented with B shortened step, head down, decreased obey.  Balance:   Good sitting balance  Fair standing balance      AM-PAC 6 CLICK MOBILITY  Turning over in bed (including adjusting bedclothes, sheets and blankets)?: 3  Sitting down on and standing up from a chair with arms (e.g., wheelchair, bedside commode, etc.): 2  Moving from lying on back to sitting on the side of the bed?: 3  Moving to and from a bed to a chair (including a wheelchair)?: 3  Need to walk in hospital room?: 3  Climbing 3-5 steps with a railing?: 1  Basic Mobility Total Score: 15       Treatment & Education:  Educated pt on PT role/POC  Educated pt on importance of OOB activity and daily ambulation   Pt educated on proper body mechanics, safety techniques, and energy conservation with PT facilitation and cueing throughout session   Pt verbalized understanding      Patient left up in chair with all lines intact, call button in reach, RN notified, and sister present..    GOALS:   Multidisciplinary Problems       Physical Therapy Goals          Problem: Physical Therapy    Goal Priority Disciplines Outcome Goal Variances Interventions   Physical Therapy Goal     PT, PT/OT Ongoing, Progressing     Description: Goals remain appropriate and extended to: 2022     Patient will increase functional independence with mobility by performin. Supine to sit with contact guard assistance  2. Sit to supine with contact guard assistance  3. Sit to stand transfer with minimum assistance MET   3a. STS supvn LRAD  4. Gait  x 40 feet with minimum assistance using LRAD as needed  5. Lower extremity exercise program x10 reps per handout, with independence                        Time Tracking:     PT Received On: 22  PT Start Time: 1337     PT Stop Time: 1401  PT Total Time (min): 24 min      Billable Minutes: Gait Training 24    Treatment Type: Treatment  PT/PTA: PT     PTA Visit Number: 0     11/28/2022

## 2022-11-28 NOTE — PLAN OF CARE
Problem: Occupational Therapy  Goal: Occupational Therapy Goal  Description: Goals to be met by: 12/15     Patient will increase functional independence with ADLs by performing:    UE Dressing with Modified Emery.  LE Dressing with Modified Emery.  Grooming while standing with Modified [place]y.  Toileting from toilet with Modified Emery for hygiene and clothing management.   Supine to sit with Modified Emery.  Step transfer with Modified Emery  Toilet transfer to toilet with Modified Emery.  Pt will demonstrate independence with theraputty HEP.    Outcome: Ongoing, Progressing     Goals updated.

## 2022-11-28 NOTE — SUBJECTIVE & OBJECTIVE
Interval History: Patient evaluated at bedside. No acute events overnight. Refers feeling well, no complaints this AM. Serum creatinine at 10.5 this AM, unchanged from yesterday. UOP of 750mL/24h.     Review of patient's allergies indicates:   Allergen Reactions    Ampicillin     Peaches [peach (prunus persica)] Other (See Comments)     Pt unable to state type of reaction. Information obtained from daughter who states she was informed of allergy from patient.    Penicillins      Other reaction(s): Hives    Sulfa (sulfonamide antibiotics) Rash and Hives     Current Facility-Administered Medications   Medication Frequency    0.9%  NaCl infusion (for blood administration) Q24H PRN    0.9%  NaCl infusion (for blood administration) Q24H PRN    0.9%  NaCl infusion (for blood administration) Q24H PRN    0.9%  NaCl infusion Once    acetaminophen tablet 650 mg Q4H PRN    albuterol-ipratropium 2.5 mg-0.5 mg/3 mL nebulizer solution 3 mL Q4H PRN    aluminum-magnesium hydroxide-simethicone 200-200-20 mg/5 mL suspension 30 mL QID PRN    aluminum-magnesium hydroxide-simethicone 200-200-20 mg/5 mL suspension 30 mL Once    And    LIDOcaine HCl 2% oral solution 15 mL Once    amLODIPine tablet 10 mg Daily    atovaquone 750 mg/5 mL oral liquid 1,500 mg Daily    bisacodyL suppository 10 mg Daily PRN    carvediloL tablet 25 mg BID    cloNIDine tablet 0.1 mg Q6H PRN    dextrose 10% bolus 125 mL PRN    dextrose 10% bolus 250 mL PRN    epoetin hira injection 2,880 Units Every Mon, Wed, Fri    glucagon (human recombinant) injection 1 mg PRN    glucose chewable tablet 16 g PRN    glucose chewable tablet 24 g PRN    heparin (porcine) injection 1,000 Units PRN    heparin (porcine) injection 1,000 Units PRN    heparin, porcine (PF) 100 unit/mL injection flush 500 Units PRN    heparin, porcine (PF) 100 unit/mL injection flush 500 Units PRN    hydrALAZINE tablet 100 mg Q8H    insulin aspart U-100 pen 1-10 Units Q6H PRN    levothyroxine tablet 25  mcg Before breakfast    melatonin tablet 6 mg Nightly PRN    methocarbamoL tablet 500 mg TID PRN    metoclopramide HCl injection 5 mg Q6H PRN    naloxone 0.4 mg/mL injection 0.02 mg PRN    nystatin 100,000 unit/mL suspension 500,000 Units QID    ondansetron injection 4 mg Q8H PRN    pantoprazole EC tablet 40 mg BID AC    predniSONE tablet 20 mg Daily    Followed by    [START ON 12/4/2022] predniSONE tablet 12.5 mg Daily    Followed by    [START ON 12/18/2022] predniSONE tablet 10 mg Daily    Followed by    [START ON 1/1/2023] predniSONE tablet 5 mg Daily    prochlorperazine injection Soln 5 mg Q6H PRN    QUEtiapine tablet 25 mg QHS    simethicone chewable tablet 80 mg QID PRN    sodium bicarbonate tablet 650 mg BID    sodium chloride 0.9% 250 mL flush bag 1 time in Clinic/HOD    sodium chloride 0.9% bolus 250 mL PRN    sodium chloride 0.9% flush 10 mL PRN    sodium chloride 0.9% flush 10 mL PRN    sodium chloride 0.9% flush 10 mL PRN    sodium chloride 0.9% flush 10 mL PRN    sodium chloride 0.9% flush 5 mL PRN     Facility-Administered Medications Ordered in Other Encounters   Medication Frequency    celecoxib capsule 400 mg Once    fentaNYL 50 mcg/mL injection  mcg PRN    LIDOcaine (PF) 10 mg/ml (1%) injection 10 mg Once PRN    LIDOcaine (PF) 10 mg/ml (1%) injection 10 mg Once    midazolam (VERSED) 1 mg/mL injection 0.5-4 mg PRN    ropivacaine 0.2% [facility] PainPRO Pump infusion 500 ML Continuous       Objective:     Vital Signs (Most Recent):  Temp: 97.7 °F (36.5 °C) (11/28/22 1110)  Pulse: 65 (11/28/22 1110)  Resp: 18 (11/28/22 1110)  BP: (!) 120/57 (11/28/22 1110)  SpO2: 97 % (11/28/22 1110)  O2 Device (Oxygen Therapy): room air (11/26/22 1515)   Vital Signs (24h Range):  Temp:  [97.7 °F (36.5 °C)-98.6 °F (37 °C)] 97.7 °F (36.5 °C)  Pulse:  [64-74] 65  Resp:  [17-18] 18  SpO2:  [94 %-98 %] 97 %  BP: (108-121)/(52-59) 120/57     Weight: 57.6 kg (126 lb 15.8 oz) (11/22/22 1328)  Body mass index is 23.99  kg/m².  Body surface area is 1.57 meters squared.    I/O last 3 completed shifts:  In: 740 [P.O.:740]  Out: 750 [Urine:750]    Physical Exam  Vitals and nursing note reviewed.   Constitutional:       General: She is awake.      Appearance: She is well-developed. She is ill-appearing. She is not toxic-appearing or diaphoretic.   HENT:      Head: Normocephalic and atraumatic.      Right Ear: External ear normal.      Left Ear: External ear normal.      Nose:      Comments: + NGT     Mouth/Throat:      Mouth: Mucous membranes are dry.   Eyes:      General: Lids are normal. No scleral icterus.        Right eye: No discharge.         Left eye: No discharge.   Cardiovascular:      Rate and Rhythm: Normal rate and regular rhythm.   Pulmonary:      Effort: Pulmonary effort is normal.      Breath sounds: Normal breath sounds. No wheezing or rhonchi.   Abdominal:      General: Bowel sounds are normal.      Palpations: Abdomen is soft.      Tenderness: There is no abdominal tenderness.   Musculoskeletal:         General: No deformity.      Cervical back: Normal range of motion and neck supple. No rigidity or tenderness.      Right lower leg: No edema.      Left lower leg: No edema.   Skin:     General: Skin is warm and dry.   Neurological:      General: No focal deficit present.      Mental Status: She is alert and oriented to person, place, and time. Mental status is at baseline.   Psychiatric:         Attention and Perception: Attention normal.         Behavior: Behavior normal.       Significant Labs:  CBC:   Recent Labs   Lab 11/28/22  0356   WBC 8.99   RBC 2.88*   HGB 8.2*   HCT 26.3*   *   MCV 91   MCH 28.5   MCHC 31.2*       CMP:   Recent Labs   Lab 11/26/22  1408 11/27/22  0720 11/28/22  0356   *   < > 81   CALCIUM 7.4*   < > 7.4*   ALBUMIN 2.5*  --   --    *   < > 139   K 4.1   < > 3.7   CO2 22*   < > 25   CL 98   < > 98   BUN 94*   < > 100*   CREATININE 9.8*   < > 10.5*    < > = values in this  interval not displayed.       All labs within the past 24 hours have been reviewed.

## 2022-11-28 NOTE — ASSESSMENT & PLAN NOTE
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  PLEX 10/26, 10/27, 10/29, 10/30, 11/1, 11/2, 11/3 (prior to Rituxan)  Rituximab 375 mg/m^2 (600 mg) on 10/27 11/3, 11/10 and 11/17 (completed 4 doses)  Cyclophosphamide 7.5 mg/kg on 10/27 and 11/10 ( every 14 days ); has completed 2 doses   Serum BUN/Cr slowly trending up; will continue monitoring daily for dialysis needs  Now following PEXIVAS steroid taper; currently on Prednisone 20mg PO daily   Sodium bicarb 650mg PO BID   Continue Atovaquone for prophylaxis  Strict I/Os and chart   Avoid nephrotoxic agents as much as possible

## 2022-11-28 NOTE — PLAN OF CARE
Problem: Physical Therapy  Goal: Physical Therapy Goal  Description: Goals remain appropriate and extended to: 2022     Patient will increase functional independence with mobility by performin. Supine to sit with contact guard assistance  2. Sit to supine with contact guard assistance  3. Sit to stand transfer with minimum assistance MET   3a. STS supvn LRAD  4. Gait  x 40 feet with minimum assistance using LRAD as needed  5. Lower extremity exercise program x10 reps per handout, with independence   Outcome: Ongoing, Progressing

## 2022-11-28 NOTE — PROGRESS NOTES
[facility] ([place] [address])  Nephrology  Progress Note    Patient Name: Kristin Goodman  MRN: [id number]  Admission Date: 10/17/2022  Hospital Length of Stay: 42 days  Attending Provider: Ej Fregoso MD   Primary Care Physician: Lori Hernandez MD  Principal Problem:Microscopic polyangiitis    Subjective:     HPI: Kristin Goodman is a 77 year old female with hypertension, hypothyroidism, HFpEF who presents for cough, shortness of breath, and weakness.  Patient initially presented to ER on 09/24 with hemoptysis and imaging concerning for multilobar pneumonia at which time she was discharged on a 10 day course of levofloxacin.  Patient initially had some improvement but began having worsening symptoms on 10/14.  Patient describes multiple fevers and progressive shortness of breath.  She continues to have intermittent hemoptysis.  Patient with worsening leukocytosis and limited clinical improvement despite broad-spectrum antibiotics.  She remains on cefepime.  Patient is a noted to have baseline creatinine of 1 as recent as 8/2022 but progressive worsening of creatinine in early October.  On admission patient with creatinine of 1.6 (10/17) with subsequent progression and creatinine of 3.0 at time of consultation (10/23).  Nephrology consulted for acute kidney injury.      Interval History: Patient evaluated at bedside. No acute events overnight. Refers feeling well, no complaints this AM. Serum creatinine at 10.5 this AM, unchanged from yesterday. UOP of 750mL/24h.     Review of patient's allergies indicates:   Allergen Reactions    Ampicillin     Peaches [peach (prunus persica)] Other (See Comments)     Pt unable to state type of reaction. Information obtained from daughter who states she was informed of allergy from patient.    Penicillins      Other reaction(s): Hives    Sulfa (sulfonamide antibiotics) Rash and Hives     Current Facility-Administered Medications   Medication Frequency    0.9%   NaCl infusion (for blood administration) Q24H PRN    0.9%  NaCl infusion (for blood administration) Q24H PRN    0.9%  NaCl infusion (for blood administration) Q24H PRN    0.9%  NaCl infusion Once    acetaminophen tablet 650 mg Q4H PRN    albuterol-ipratropium 2.5 mg-0.5 mg/3 mL nebulizer solution 3 mL Q4H PRN    aluminum-magnesium hydroxide-simethicone 200-200-20 mg/5 mL suspension 30 mL QID PRN    aluminum-magnesium hydroxide-simethicone 200-200-20 mg/5 mL suspension 30 mL Once    And    LIDOcaine HCl 2% oral solution 15 mL Once    amLODIPine tablet 10 mg Daily    atovaquone 750 mg/5 mL oral liquid 1,500 mg Daily    bisacodyL suppository 10 mg Daily PRN    carvediloL tablet 25 mg BID    cloNIDine tablet 0.1 mg Q6H PRN    dextrose 10% bolus 125 mL PRN    dextrose 10% bolus 250 mL PRN    epoetin hira injection 2,880 Units Every Mon, Wed, Fri    glucagon (human recombinant) injection 1 mg PRN    glucose chewable tablet 16 g PRN    glucose chewable tablet 24 g PRN    heparin (porcine) injection 1,000 Units PRN    heparin (porcine) injection 1,000 Units PRN    heparin, porcine (PF) 100 unit/mL injection flush 500 Units PRN    heparin, porcine (PF) 100 unit/mL injection flush 500 Units PRN    hydrALAZINE tablet 100 mg Q8H    insulin aspart U-100 pen 1-10 Units Q6H PRN    levothyroxine tablet 25 mcg Before breakfast    melatonin tablet 6 mg Nightly PRN    methocarbamoL tablet 500 mg TID PRN    metoclopramide HCl injection 5 mg Q6H PRN    naloxone 0.4 mg/mL injection 0.02 mg PRN    nystatin 100,000 unit/mL suspension 500,000 Units QID    ondansetron injection 4 mg Q8H PRN    pantoprazole EC tablet 40 mg BID AC    predniSONE tablet 20 mg Daily    Followed by    [START ON 12/4/2022] predniSONE tablet 12.5 mg Daily    Followed by    [START ON 12/18/2022] predniSONE tablet 10 mg Daily    Followed by    [START ON 1/1/2023] predniSONE tablet 5 mg Daily    prochlorperazine injection Soln 5 mg  Q6H PRN    QUEtiapine tablet 25 mg QHS    simethicone chewable tablet 80 mg QID PRN    sodium bicarbonate tablet 650 mg BID    sodium chloride 0.9% 250 mL flush bag 1 time in Clinic/HOD    sodium chloride 0.9% bolus 250 mL PRN    sodium chloride 0.9% flush 10 mL PRN    sodium chloride 0.9% flush 10 mL PRN    sodium chloride 0.9% flush 10 mL PRN    sodium chloride 0.9% flush 10 mL PRN    sodium chloride 0.9% flush 5 mL PRN     Facility-Administered Medications Ordered in Other Encounters   Medication Frequency    celecoxib capsule 400 mg Once    fentaNYL 50 mcg/mL injection  mcg PRN    LIDOcaine (PF) 10 mg/ml (1%) injection 10 mg Once PRN    LIDOcaine (PF) 10 mg/ml (1%) injection 10 mg Once    midazolam (VERSED) 1 mg/mL injection 0.5-4 mg PRN    ropivacaine 0.2% [facility] PainPRO Pump infusion 500 ML Continuous       Objective:     Vital Signs (Most Recent):  Temp: 97.7 °F (36.5 °C) (11/28/22 1110)  Pulse: 65 (11/28/22 1110)  Resp: 18 (11/28/22 1110)  BP: (!) 120/57 (11/28/22 1110)  SpO2: 97 % (11/28/22 1110)  O2 Device (Oxygen Therapy): room air (11/26/22 1515)   Vital Signs (24h Range):  Temp:  [97.7 °F (36.5 °C)-98.6 °F (37 °C)] 97.7 °F (36.5 °C)  Pulse:  [64-74] 65  Resp:  [17-18] 18  SpO2:  [94 %-98 %] 97 %  BP: (108-121)/(52-59) 120/57     Weight: 57.6 kg (126 lb 15.8 oz) (11/22/22 1328)  Body mass index is 23.99 kg/m².  Body surface area is 1.57 meters squared.    I/O last 3 completed shifts:  In: 740 [P.O.:740]  Out: 750 [Urine:750]    Physical Exam  Vitals and nursing note reviewed.   Constitutional:       General: She is awake.      Appearance: She is well-developed. She is ill-appearing. She is not toxic-appearing or diaphoretic.   HENT:      Head: Normocephalic and atraumatic.      Right Ear: External ear normal.      Left Ear: External ear normal.      Nose:      Comments: + NGT     Mouth/Throat:      Mouth: Mucous membranes are dry.   Eyes:      General: Lids are normal. No scleral  icterus.        Right eye: No discharge.         Left eye: No discharge.   Cardiovascular:      Rate and Rhythm: Normal rate and regular rhythm.   Pulmonary:      Effort: Pulmonary effort is normal.      Breath sounds: Normal breath sounds. No wheezing or rhonchi.   Abdominal:      General: Bowel sounds are normal.      Palpations: Abdomen is soft.      Tenderness: There is no abdominal tenderness.   Musculoskeletal:         General: No deformity.      Cervical back: Normal range of motion and neck supple. No rigidity or tenderness.      Right lower leg: No edema.      Left lower leg: No edema.   Skin:     General: Skin is warm and dry.   Neurological:      General: No focal deficit present.      Mental Status: She is alert and oriented to person, place, and time. Mental status is at baseline.   Psychiatric:         Attention and Perception: Attention normal.         Behavior: Behavior normal.       Significant Labs:  CBC:   Recent Labs   Lab 11/28/22  0356   WBC 8.99   RBC 2.88*   HGB 8.2*   HCT 26.3*   *   MCV 91   MCH 28.5   MCHC 31.2*       CMP:   Recent Labs   Lab 11/26/22  1408 11/27/22  0720 11/28/22  0356   *   < > 81   CALCIUM 7.4*   < > 7.4*   ALBUMIN 2.5*  --   --    *   < > 139   K 4.1   < > 3.7   CO2 22*   < > 25   CL 98   < > 98   BUN 94*   < > 100*   CREATININE 9.8*   < > 10.5*    < > = values in this interval not displayed.       All labs within the past 24 hours have been reviewed.       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  PLEX 10/26, 10/27, 10/29, 10/30, 11/1, 11/2, 11/3 (prior to Rituxan)  Rituximab 375 mg/m^2 (600 mg) on 10/27 11/3, 11/10 and 11/17 (completed 4 doses)  Cyclophosphamide 7.5 mg/kg on 10/27 and 11/10 ( every 14 days ); has completed 2 doses   Serum BUN/Cr slowly trending up; will continue  monitoring daily for dialysis needs  Now following PEXIVAS steroid taper; currently on Prednisone 20mg PO daily   Sodium bicarb 650mg PO BID   Continue Atovaquone for prophylaxis  Strict I/Os and chart   Avoid nephrotoxic agents as much as possible           James Gomez MD  Nephrology  [facility] ([place])

## 2022-11-28 NOTE — PLAN OF CARE
Problem: Adult Inpatient Plan of Care  Goal: Plan of Care Review  Outcome: Ongoing, Progressing  Goal: Optimal Comfort and Wellbeing  Outcome: Ongoing, Progressing     Problem: Glycemic Control Impaired (Sepsis/Septic Shock)  Goal: Blood Glucose Level Within Desired Range  Outcome: Ongoing, Progressing     Problem: Oral Intake Inadequate (Acute Kidney Injury/Impairment)  Goal: Optimal Nutrition Intake  Outcome: Ongoing, Progressing

## 2022-11-28 NOTE — PLAN OF CARE
Problem: Adult Inpatient Plan of Care  Goal: Optimal Comfort and Wellbeing  Outcome: Ongoing, Progressing     Problem: Adult Inpatient Plan of Care  Goal: Absence of Hospital-Acquired Illness or Injury  Outcome: Ongoing, Progressing     Problem: Infection  Goal: Absence of Infection Signs and Symptoms  Outcome: Ongoing, Progressing    AAOx4, RA, no report of SOB, discomfort, or pain. Worked with PT/OT and up in chair for most of the day.

## 2022-11-28 NOTE — PT/OT/SLP PROGRESS
"Occupational Therapy   Treatment    Name: Kristin Goodman  MRN: [id number]  Admitting Diagnosis:  Microscopic polyangiitis  14 Days Post-Op    Recommendations:     Discharge Recommendations: nursing facility, skilled  Discharge Equipment Recommendations:  bedside commode  Barriers to discharge:  None    Assessment:     Kristin Goodman is a 77 y.o. female with a medical diagnosis of Microscopic polyangiitis.  She presents with limited session due to being upset/focused on having to go to dialysis today.  Pt was agreeable to theraputty exercises however did not want to perform any other activities due to upcoming HD today. Performance deficits affecting function are weakness, impaired endurance, impaired self care skills, impaired functional mobility, impaired balance, decreased upper extremity function, decreased lower extremity function, impaired cardiopulmonary response to activity.     Rehab Prognosis:  Good; patient would benefit from acute skilled OT services to address these deficits and reach maximum level of function.       Plan:     Patient to be seen 3 x/week to address the above listed problems via self-care/home management, therapeutic activities, therapeutic exercises  Plan of Care Expires: 12/29/2022  Plan of Care Reviewed with: patient, daughter    Subjective   "Do you know what time I'm going to dialysis?"  Pain/Comfort:  Pain Rating 1: 0/10  Pain Rating Post-Intervention 1: 0/10    Objective:     Communicated with: RN prior to session.  Patient found up in chair with Trialysis (daughter present during session) upon OT entry to room.    General Precautions: Standard, fall, aspiration   Orthopedic Precautions:N/A   Braces: N/A  Respiratory Status: Room air     Occupational Performance:       AMPA 6 Click ADL: 16    Treatment & Education:  Pt declined mobility on this date however was agreeable to performing theraputty exercises.  Provided education on proper technique for theraputty exercises via HEP, " demonstration & verbal instruction. Pt performed 3 exercises from HEP with BUE with difficulty accurately performing 2 of the exercises.  Adapted exercises as needed & recommended a different exercise (provided written education on HEP regarding new exercise.  Pt declined further mobility reporting that she was just focused on her dialysis. Pt had no further questions & when asked whether there were any concerns pt reported none.      Patient left up in chair with all lines intact, call button in reach, and RN notified    GOALS:   Multidisciplinary Problems       Occupational Therapy Goals          Problem: Occupational Therapy    Goal Priority Disciplines Outcome Interventions   Occupational Therapy Goal     OT, PT/OT Ongoing, Progressing    Description: Goals to be met by: 12/15     Patient will increase functional independence with ADLs by performing:    UE Dressing with Modified [place].  LE Dressing with Modified [place].  Grooming while standing with Modified [place].  Toileting from toilet with Modified [place] for hygiene and clothing management.   Supine to sit with Modified [place].  Step transfer with Modified [place]  Toilet transfer to toilet with Modified [place].  Pt will demonstrate independence with theraputty HEP.                         Time Tracking:     OT Date of Treatment: 11/28/22  OT Start Time: 0928  OT Stop Time: 0948  OT Total Time (min): 20 min    Billable Minutes:Therapeutic Exercise 20    OT/SARAHI: OT     SARAHI Visit Number: 0    11/28/2022

## 2022-11-29 LAB
ANION GAP SERPL CALC-SCNC: 19 MMOL/L (ref 8–16)
BASOPHILS # BLD AUTO: 0.01 K/UL (ref 0–0.2)
BASOPHILS NFR BLD: 0.1 % (ref 0–1.9)
BUN SERPL-MCNC: 98 MG/DL (ref 8–23)
CALCIUM SERPL-MCNC: 7.5 MG/DL (ref 8.7–10.5)
CHLORIDE SERPL-SCNC: 98 MMOL/L (ref 95–110)
CO2 SERPL-SCNC: 22 MMOL/L (ref 23–29)
CREAT SERPL-MCNC: 11.3 MG/DL (ref 0.5–1.4)
DIFFERENTIAL METHOD: ABNORMAL
EOSINOPHIL # BLD AUTO: 0 K/UL (ref 0–0.5)
EOSINOPHIL NFR BLD: 0 % (ref 0–8)
ERYTHROCYTE [DISTWIDTH] IN BLOOD BY AUTOMATED COUNT: 14.7 % (ref 11.5–14.5)
EST. GFR  (NO RACE VARIABLE): 3.2 ML/MIN/1.73 M^2
GLUCOSE SERPL-MCNC: 80 MG/DL (ref 70–110)
HCT VFR BLD AUTO: 25.5 % (ref 37–48.5)
HGB BLD-MCNC: 8.4 G/DL (ref 12–16)
IMM GRANULOCYTES # BLD AUTO: 0.11 K/UL (ref 0–0.04)
IMM GRANULOCYTES NFR BLD AUTO: 1.2 % (ref 0–0.5)
LYMPHOCYTES # BLD AUTO: 1.5 K/UL (ref 1–4.8)
LYMPHOCYTES NFR BLD: 15.7 % (ref 18–48)
MAGNESIUM SERPL-MCNC: 1.8 MG/DL (ref 1.6–2.6)
MCH RBC QN AUTO: 29.2 PG (ref 27–31)
MCHC RBC AUTO-ENTMCNC: 32.9 G/DL (ref 32–36)
MCV RBC AUTO: 89 FL (ref 82–98)
MONOCYTES # BLD AUTO: 0.7 K/UL (ref 0.3–1)
MONOCYTES NFR BLD: 7.6 % (ref 4–15)
NEUTROPHILS # BLD AUTO: 7.2 K/UL (ref 1.8–7.7)
NEUTROPHILS NFR BLD: 75.4 % (ref 38–73)
NRBC BLD-RTO: 0 /100 WBC
PHOSPHATE SERPL-MCNC: 7.2 MG/DL (ref 2.7–4.5)
PLATELET # BLD AUTO: 110 K/UL (ref 150–450)
PMV BLD AUTO: 12.4 FL (ref 9.2–12.9)
POCT GLUCOSE: 127 MG/DL (ref 70–110)
POCT GLUCOSE: 141 MG/DL (ref 70–110)
POCT GLUCOSE: 141 MG/DL (ref 70–110)
POCT GLUCOSE: 89 MG/DL (ref 70–110)
POTASSIUM SERPL-SCNC: 3.8 MMOL/L (ref 3.5–5.1)
RBC # BLD AUTO: 2.88 M/UL (ref 4–5.4)
SODIUM SERPL-SCNC: 139 MMOL/L (ref 136–145)
WBC # BLD AUTO: 9.53 K/UL (ref 3.9–12.7)

## 2022-11-29 PROCEDURE — 99233 PR SUBSEQUENT HOSPITAL CARE,LEVL III: ICD-10-PCS | Mod: ,,, | Performed by: INTERNAL MEDICINE

## 2022-11-29 PROCEDURE — 25000003 PHARM REV CODE 250: Performed by: HOSPITALIST

## 2022-11-29 PROCEDURE — 83735 ASSAY OF MAGNESIUM: CPT

## 2022-11-29 PROCEDURE — 25000003 PHARM REV CODE 250: Performed by: STUDENT IN AN ORGANIZED HEALTH CARE EDUCATION/TRAINING PROGRAM

## 2022-11-29 PROCEDURE — 36415 COLL VENOUS BLD VENIPUNCTURE: CPT

## 2022-11-29 PROCEDURE — 63600175 PHARM REV CODE 636 W HCPCS: Performed by: STUDENT IN AN ORGANIZED HEALTH CARE EDUCATION/TRAINING PROGRAM

## 2022-11-29 PROCEDURE — 25000003 PHARM REV CODE 250

## 2022-11-29 PROCEDURE — 99232 SBSQ HOSP IP/OBS MODERATE 35: CPT | Mod: ,,, | Performed by: STUDENT IN AN ORGANIZED HEALTH CARE EDUCATION/TRAINING PROGRAM

## 2022-11-29 PROCEDURE — 85025 COMPLETE CBC W/AUTO DIFF WBC: CPT | Performed by: STUDENT IN AN ORGANIZED HEALTH CARE EDUCATION/TRAINING PROGRAM

## 2022-11-29 PROCEDURE — 99233 SBSQ HOSP IP/OBS HIGH 50: CPT | Mod: ,,, | Performed by: INTERNAL MEDICINE

## 2022-11-29 PROCEDURE — 25000003 PHARM REV CODE 250: Performed by: INTERNAL MEDICINE

## 2022-11-29 PROCEDURE — 84100 ASSAY OF PHOSPHORUS: CPT

## 2022-11-29 PROCEDURE — 20600001 HC STEP DOWN PRIVATE ROOM

## 2022-11-29 PROCEDURE — 99232 PR SUBSEQUENT HOSPITAL CARE,LEVL II: ICD-10-PCS | Mod: ,,, | Performed by: STUDENT IN AN ORGANIZED HEALTH CARE EDUCATION/TRAINING PROGRAM

## 2022-11-29 PROCEDURE — 80048 BASIC METABOLIC PNL TOTAL CA: CPT | Performed by: STUDENT IN AN ORGANIZED HEALTH CARE EDUCATION/TRAINING PROGRAM

## 2022-11-29 PROCEDURE — 90935 HEMODIALYSIS ONE EVALUATION: CPT

## 2022-11-29 RX ORDER — HEPARIN SODIUM 1000 [USP'U]/ML
1000 INJECTION, SOLUTION INTRAVENOUS; SUBCUTANEOUS
Status: DISCONTINUED | OUTPATIENT
Start: 2022-11-29 | End: 2022-12-13 | Stop reason: HOSPADM

## 2022-11-29 RX ORDER — SODIUM CHLORIDE 9 MG/ML
INJECTION, SOLUTION INTRAVENOUS ONCE
Status: COMPLETED | OUTPATIENT
Start: 2022-11-29 | End: 2022-11-29

## 2022-11-29 RX ADMIN — PANTOPRAZOLE SODIUM 40 MG: 40 TABLET, DELAYED RELEASE ORAL at 06:11

## 2022-11-29 RX ADMIN — CARVEDILOL 25 MG: 25 TABLET, FILM COATED ORAL at 09:11

## 2022-11-29 RX ADMIN — AMLODIPINE BESYLATE 10 MG: 10 TABLET ORAL at 09:11

## 2022-11-29 RX ADMIN — HEPARIN SODIUM 1000 UNITS: 1000 INJECTION, SOLUTION INTRAVENOUS; SUBCUTANEOUS at 05:11

## 2022-11-29 RX ADMIN — HYDRALAZINE HYDROCHLORIDE 100 MG: 50 TABLET ORAL at 05:11

## 2022-11-29 RX ADMIN — QUETIAPINE FUMARATE 25 MG: 25 TABLET ORAL at 09:11

## 2022-11-29 RX ADMIN — LEVOTHYROXINE SODIUM 25 MCG: 25 TABLET ORAL at 05:11

## 2022-11-29 RX ADMIN — SODIUM CHLORIDE: 0.9 INJECTION, SOLUTION INTRAVENOUS at 02:11

## 2022-11-29 RX ADMIN — ATOVAQUONE 1500 MG: 750 SUSPENSION ORAL at 09:11

## 2022-11-29 RX ADMIN — SODIUM BICARBONATE 650 MG TABLET 650 MG: at 09:11

## 2022-11-29 RX ADMIN — PREDNISONE 20 MG: 20 TABLET ORAL at 09:11

## 2022-11-29 RX ADMIN — HYDRALAZINE HYDROCHLORIDE 100 MG: 50 TABLET ORAL at 11:11

## 2022-11-29 RX ADMIN — PANTOPRAZOLE SODIUM 40 MG: 40 TABLET, DELAYED RELEASE ORAL at 05:11

## 2022-11-29 NOTE — SUBJECTIVE & OBJECTIVE
Interval History: see above    Review of Systems   Constitutional:  Negative for fatigue and fever.        Just sick of being in hospital   HENT: Negative.     Respiratory:  Negative for chest tightness and shortness of breath.    Cardiovascular:  Negative for chest pain and leg swelling.   Gastrointestinal:  Negative for abdominal pain, constipation, diarrhea and nausea.   Genitourinary:  Negative for difficulty urinating and dysuria.   Musculoskeletal: Negative.    Skin:  Negative for rash.   Neurological:  Negative for light-headedness and headaches.   Hematological:  Does not bruise/bleed easily.   Psychiatric/Behavioral:  Negative for agitation and behavioral problems.    Objective:     Vital Signs (Most Recent):  Temp: 97.6 °F (36.4 °C) (11/28/22 1558)  Pulse: 65 (11/28/22 1558)  Resp: 18 (11/28/22 1558)  BP: (!) 121/59 (11/28/22 1558)  SpO2: 97 % (11/28/22 1558)   Vital Signs (24h Range):  Temp:  [97.6 °F (36.4 °C)-98.6 °F (37 °C)] 97.6 °F (36.4 °C)  Pulse:  [64-74] 65  Resp:  [17-18] 18  SpO2:  [97 %-98 %] 97 %  BP: (108-121)/(52-59) 121/59     Weight: 57.6 kg (126 lb 15.8 oz)  Body mass index is 23.99 kg/m².    Intake/Output Summary (Last 24 hours) at 11/28/2022 1830  Last data filed at 11/28/2022 1800  Gross per 24 hour   Intake 640 ml   Output 400 ml   Net 240 ml        Physical Exam  Constitutional:       General: She is not in acute distress.  HENT:      Head: Normocephalic and atraumatic.      Comments: Trialysis line in place c/d/i     Mouth/Throat:      Mouth: Mucous membranes are moist.      Pharynx: Oropharynx is clear.   Eyes:      General: No scleral icterus.  Cardiovascular:      Rate and Rhythm: Normal rate and regular rhythm.      Heart sounds: No murmur heard.    No gallop.   Pulmonary:      Effort: Pulmonary effort is normal.      Comments: Crackles in bases bilaterally  Abdominal:      General: Bowel sounds are normal. There is no distension.      Tenderness: There is no abdominal  tenderness.   Musculoskeletal:      Right lower leg: Edema present.      Left lower leg: Edema present.      Comments: 1+ bilateral LE edema   Skin:     General: Skin is warm and dry.   Neurological:      Mental Status: She is alert and oriented to person, place, and time. Mental status is at baseline.   Psychiatric:         Mood and Affect: Mood normal.         Behavior: Behavior normal.       Significant Labs: All pertinent labs within the past 24 hours have been reviewed.  Recent Lab Results  (Last 5 results in the past 24 hours)        11/28/22  1600   11/28/22  1111   11/28/22  0814   11/28/22  0356   11/27/22  1924        Anion Gap       16         Baso #       0.02         Basophil %       0.2         BUN       100         Calcium       7.4         Chloride       98         CO2       25         Creatinine       10.5         Differential Method       Automated         eGFR       3.5         Eos #       0.0         Eosinophil %       0.1         Glucose       81         Gran # (ANC)       6.9         Gran %       76.6         Hematocrit       26.3         Hemoglobin       8.2         Immature Grans (Abs)       0.13  Comment: Mild elevation in immature granulocytes is non specific and   can be seen in a variety of conditions including stress response,   acute inflammation, trauma and pregnancy. Correlation with other   laboratory and clinical findings is essential.           Immature Granulocytes       1.4         Lymph #       1.3         Lymph %       14.5         Magnesium       1.8         MCH       28.5         MCHC       31.2         MCV       91         Mono #       0.7         Mono %       7.2         MPV       12.2         nRBC       0         Phosphorus       6.6         Platelets       102         POCT Glucose 175   110   101     157       Potassium       3.7         RBC       2.88         RDW       15.0         Sodium       139         WBC       8.99                                Significant  Imaging: I have reviewed all pertinent imaging results/findings within the past 24 hours.

## 2022-11-29 NOTE — PLAN OF CARE
[facility] [employer] Phone: [phone]  - facility continue to follow patient and will contact daughter Roro to sign paperwork. SW will follow patient.      Irene Ribera LMSW  PRN - [facility]  [phone]

## 2022-11-29 NOTE — SUBJECTIVE & OBJECTIVE
Interval History: Patient evaluated at bedside. No acute events overnight. Refers feeling well, no complaints this AM. Serum creatinine up to 11.3 this AM. UOP of 700mL/24h.     Review of patient's allergies indicates:   Allergen Reactions    Ampicillin     Peaches [peach (prunus persica)] Other (See Comments)     Pt unable to state type of reaction. Information obtained from daughter who states she was informed of allergy from patient.    Penicillins      Other reaction(s): Hives    Sulfa (sulfonamide antibiotics) Rash and Hives     Current Facility-Administered Medications   Medication Frequency    0.9%  NaCl infusion (for blood administration) Q24H PRN    0.9%  NaCl infusion (for blood administration) Q24H PRN    0.9%  NaCl infusion (for blood administration) Q24H PRN    0.9%  NaCl infusion Once    0.9%  NaCl infusion Once    acetaminophen tablet 650 mg Q4H PRN    albuterol-ipratropium 2.5 mg-0.5 mg/3 mL nebulizer solution 3 mL Q4H PRN    aluminum-magnesium hydroxide-simethicone 200-200-20 mg/5 mL suspension 30 mL QID PRN    aluminum-magnesium hydroxide-simethicone 200-200-20 mg/5 mL suspension 30 mL Once    And    LIDOcaine HCl 2% oral solution 15 mL Once    amLODIPine tablet 10 mg Daily    atovaquone 750 mg/5 mL oral liquid 1,500 mg Daily    bisacodyL suppository 10 mg Daily PRN    carvediloL tablet 25 mg BID    cloNIDine tablet 0.1 mg Q6H PRN    dextrose 10% bolus 125 mL PRN    dextrose 10% bolus 250 mL PRN    epoetin hira injection 2,880 Units Every Mon, Wed, Fri    glucagon (human recombinant) injection 1 mg PRN    glucose chewable tablet 16 g PRN    glucose chewable tablet 24 g PRN    heparin (porcine) injection 1,000 Units PRN    heparin (porcine) injection 1,000 Units PRN    heparin (porcine) injection 1,000 Units PRN    heparin, porcine (PF) 100 unit/mL injection flush 500 Units PRN    heparin, porcine (PF) 100 unit/mL injection flush 500 Units PRN    hydrALAZINE tablet 100 mg Q8H    insulin aspart U-100  pen 1-10 Units Q6H PRN    levothyroxine tablet 25 mcg Before breakfast    melatonin tablet 6 mg Nightly PRN    methocarbamoL tablet 500 mg TID PRN    metoclopramide HCl injection 5 mg Q6H PRN    naloxone 0.4 mg/mL injection 0.02 mg PRN    ondansetron injection 4 mg Q8H PRN    pantoprazole EC tablet 40 mg BID AC    predniSONE tablet 20 mg Daily    Followed by    [START ON 12/4/2022] predniSONE tablet 12.5 mg Daily    Followed by    [START ON 12/18/2022] predniSONE tablet 10 mg Daily    Followed by    [START ON 1/1/2023] predniSONE tablet 5 mg Daily    prochlorperazine injection Soln 5 mg Q6H PRN    QUEtiapine tablet 25 mg QHS    simethicone chewable tablet 80 mg QID PRN    sodium bicarbonate tablet 650 mg BID    sodium chloride 0.9% 250 mL flush bag 1 time in Clinic/HOD    sodium chloride 0.9% bolus 250 mL PRN    sodium chloride 0.9% bolus 250 mL PRN    sodium chloride 0.9% flush 10 mL PRN    sodium chloride 0.9% flush 10 mL PRN    sodium chloride 0.9% flush 10 mL PRN    sodium chloride 0.9% flush 10 mL PRN    sodium chloride 0.9% flush 5 mL PRN     Facility-Administered Medications Ordered in Other Encounters   Medication Frequency    celecoxib capsule 400 mg Once    fentaNYL 50 mcg/mL injection  mcg PRN    LIDOcaine (PF) 10 mg/ml (1%) injection 10 mg Once PRN    LIDOcaine (PF) 10 mg/ml (1%) injection 10 mg Once    midazolam (VERSED) 1 mg/mL injection 0.5-4 mg PRN    ropivacaine 0.2% [facility] PainPRO Pump infusion 500 ML Continuous       Objective:     Vital Signs (Most Recent):  Temp: 97.7 °F (36.5 °C) (11/29/22 1359)  Pulse: 66 (11/29/22 1415)  Resp: 16 (11/29/22 1359)  BP: 134/60 (11/29/22 1415)  SpO2: 98 % (11/29/22 1137)  O2 Device (Oxygen Therapy): room air (11/26/22 1515)   Vital Signs (24h Range):  Temp:  [97.6 °F (36.4 °C)-98.6 °F (37 °C)] 97.7 °F (36.5 °C)  Pulse:  [63-74] 66  Resp:  [16-18] 16  SpO2:  [93 %-98 %] 98 %  BP: (112-145)/(55-70) 134/60     Weight: 57.6 kg (126 lb 15.8 oz) (11/29/22  1000)  Body mass index is 23.99 kg/m².  Body surface area is 1.57 meters squared.    I/O last 3 completed shifts:  In: 640 [P.O.:640]  Out: 700 [Urine:700]    Physical Exam  Vitals and nursing note reviewed.   Constitutional:       General: She is awake.      Appearance: She is well-developed. She is ill-appearing. She is not toxic-appearing or diaphoretic.   HENT:      Head: Normocephalic and atraumatic.      Right Ear: External ear normal.      Left Ear: External ear normal.      Nose:      Comments: + NGT     Mouth/Throat:      Mouth: Mucous membranes are dry.   Eyes:      General: Lids are normal. No scleral icterus.        Right eye: No discharge.         Left eye: No discharge.   Cardiovascular:      Rate and Rhythm: Normal rate and regular rhythm.   Pulmonary:      Effort: Pulmonary effort is normal.      Breath sounds: Normal breath sounds. No wheezing or rhonchi.   Abdominal:      General: Bowel sounds are normal.      Palpations: Abdomen is soft.      Tenderness: There is no abdominal tenderness.   Musculoskeletal:         General: No deformity.      Cervical back: Normal range of motion and neck supple. No rigidity or tenderness.      Right lower leg: No edema.      Left lower leg: No edema.   Skin:     General: Skin is warm and dry.   Neurological:      General: No focal deficit present.      Mental Status: She is alert and oriented to person, place, and time. Mental status is at baseline.   Psychiatric:         Attention and Perception: Attention normal.         Behavior: Behavior normal.       Significant Labs:  CBC:   Recent Labs   Lab 11/29/22  0508   WBC 9.53   RBC 2.88*   HGB 8.4*   HCT 25.5*   *   MCV 89   MCH 29.2   MCHC 32.9       CMP:   Recent Labs   Lab 11/26/22  1408 11/27/22  0720 11/29/22  0508   *   < > 80   CALCIUM 7.4*   < > 7.5*   ALBUMIN 2.5*  --   --    *   < > 139   K 4.1   < > 3.8   CO2 22*   < > 22*   CL 98   < > 98   BUN 94*   < > 98*   CREATININE 9.8*   < >  11.3*    < > = values in this interval not displayed.       All labs within the past 24 hours have been reviewed.

## 2022-11-29 NOTE — ASSESSMENT & PLAN NOTE
Due to microscopic polyangiitis. Remains on hemodialysis intermittently.   - Baseline creatinine 1.0-1.2.   - New onset proteinuria/hematuria.   - Renal biopsy completed and   - Trialysis in place for intermittent Hemodialysis    Plan  - Nephrology consulted, appreciate management  - management of MPA as noted separately  - Renal function panel daily  - renally dose all medications  - intermittent Hemodialysis as indicated, per Nephrology   11/17     Recent Labs   Lab 11/26/22  1408 11/27/22  0720 11/28/22  0356   BUN 94* 91* 100*   CREATININE 9.8* 10.5* 10.5*     . Nephrology following for HD and  monitoring for renal recovery with some  improvement in UOP in last 2 days. will need PermCath and HD chair set up if no renal  recovery.  likely SNF pending nephro decision regarding HD   11/20  mL /24h. BUN/CR trending up to 113/9.8 , started on sodium bicarb for bicarb 19. continue lokelma 5g daily for 5.1   11/23 HD yesterday without ultrafiltration . UOP 500ml/24h   11/24: UOP 1100 cc in 24hr  11/25: So far today urinated 400cc by about 11am  11/26: Still monitoring for RRT needs  11/27: HD tomorrow, starting sodium bicarb, giving lokelma today  11/28: HD did not happen today, plan for tomorrow

## 2022-11-29 NOTE — PROGRESS NOTES
[facility] - [facility] ([address] Medicine  Progress Note    Patient Name: Kristin Goodman  MRN: [id number]  Patient Class: IP- Inpatient   Admission Date: 10/17/2022  Length of Stay: 42 days  Attending Physician: Ej Fregoso MD  Primary Care Provider: Lori Hernandez MD        Subjective:     Principal Problem:Microscopic polyangiitis        HPI:  Kristin Goodman is a 77 y.o. female with a past medical history of HTN, hypothyroidism, HFpEF, and osteoarthritis of the arm who has presented to the ED for cough, SOB, and weakness. Daughter is present at the bedside. Patient presented to the ED on 9/24 with hemoptysis, CT and CXR showed high suspicion for multilobar pneumonia. Patient was discharged with a 10-day course of levofloxacin and an albuterol inhaler. Patient followed up with her PCP on 10/4 with slight improvement in symptoms and went back to work. Patient continued to have worsening symptoms and presented to the ED again on 10/14 for evaluation and no interventions were done. Patient endorses fevers over the last few days up to 102.4 F with progressive SOB. She endorses hemoptysis with moderate amount of blood, generalized weakness, productive cough, SOB, and loss of appetite. Denies chest pain, nausea, vomiting, abdominal pain, or urinary changes.    ED: hypertensive up to 219/93 and tachycardic up to 117. Oxygen saturation on 92% on RA, placed on 5L NC with sats >97%. CBC remarkable for leukocytosis of 16.03 and Hb 7.7. K 3.3. Cr 1.6, baseline ~1.0. . Troponin 0.061. COVID and flu negative. EKG NSR. CT chest with contrast pending at time of admission. Given home amlodipine and lisinopril. Given 500mL NS, IV azithromycin, cefepime and vanc.       Overview/Hospital Course:  Ms. Goodman was admitted to Hospital Medicine for management of multifocal pneumonia and acute hypoxemic respiratory failure after presenting to the ED with fevers, cough, hemoptysis, and dyspnea. She required  escalation to the Medical ICU due to abrupt decline in her respiratory status, associated with hemoptysis and requiring NIPPV. CT chest showed bilateral patchy ground glass opacities. Labs additionally were notable for acute renal failure on CKD3. Pulmonology was consulted while under the care of [facility] and felt this picture was consistent with diffuse alveolar hemorrhage.     Her workup revealed a strongly positive MPO Ab consistent with clinical picture of microscopic polyangiitis with pulmonary and renal involvement. She underwent Trialysis catheter placement 10/25/2022 in the Medical ICU. 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     Rheumatology was consulted, extensive workup revealed MITUL + 1:2560 homogenous, +dsDNA, normal complements, PR3 1.2 (slight +),  (+), cANCA + 1:80, pANCA neg, GBMAb neg, trace cryos. Due to concern for MPA, she was started on pulse dose IV methylprednisolone 1000mg for 3 days, and plasmapheresis under the guidance of Transfusion Medicine. She remains on a steroid taper and has completed PLEX. She additionally received rituximab and cyclophosphamide. OI prophylaxis was provided with atovaquone due to a sulfa allergy.     Nephrology was consulted for evaluation of acute renal failure in the setting of MPA. She did require SLED in the ICU for renal clearance and remains on intermittent hemodialysis. She is expected to continue HD once discharged.     Her acute hypoxemic respiratory failure improved and she was weaned off of supplemental oxygen. She stepped down to [facility] 10/28.     She underwent MBBS for evaluation of dysphagia, which showed global delayed initiation of swallow and aspiration with thin liquids. Due to  concern for possible prior stroke, head imaging was completed and negative for acute finding. She is NPO with NG tube. ENT was consulted for evaluation of dysphagia and cervical adenopathy.  Patient did not tolerate laryngoscopy; unable to visualize vocal cords but given her lack of hoarseness, they did not suspect vocal fold paresis/paralysis. MRI recommended by rheum to fully rule out any potential neurological cause of the patient's dysphagia; but demonstrated   remote left thalamic lacunar type infarct and punctate remote left cerebellar infarcts.  She is continuing to work with speech therapy.  EGD completed 11/14 without abnormalities.  Per GI, Suspect some aspect of esophageal dysmotility in setting of critical illness. No further testing at this time.  Per SLP, diet advanced on 11/15 and NG tube removed.    Her other chronic medical conditions including hypothyroidism, diastolic CHF, and hypertension were managed with her home medications, with dose adjustments as needed.     11/17 Hb trended dopwn to 6.6. Transfusion with 1 unit of PRBC .  Rheumatology following for steroid dosing and chemotherapeutic agents. on IV steroids due to p.o. intake issues, however can transition to p.o. when swallowing issues reliably resolved - on steroid taper - solumedrol   rituximab dose due November 17; has been . Nephrology following for HD and  monitoring for renal recovery with some  improvement in UOP in last 2 days. will need PermCath and HD chair set up if no renal  recovery.  likely SNF pending nephro decision regarding HD . No need for HD today. Continue 2 grams of sodium chloride tablets TID for likely SIADH. SLP recs Mechanical soft, Liquid Diet Level: Thin   11/18  sodium normalized at 137. salt tablets discontinued. Hb at 8.5 s/p 1 U PRBC. UOP 400mL /24h.  K at 5 . switched to prednisone taper by nephrology .started lokelma 5 mg x 3days  11/19  mL /24h. BUN/CR trending up to 102/9.3 ,no HD for now per  nephro  11/20  mL /24h. BUN/CR trending up to 113/9.8 , started on sodium bicarb for bicarb 19. continue lokelma 5g daily for 5.1 .   11/21 nephrology follow up  -No urgent indication for HD today as with no signs of uremic encephalopathy or  O2 requirements  11/22 HD x1 today for uremia   11/23 Hb 7.1 . HD yesterday without ultrafiltration . UOp 500ml/24h   11/24: Hgb up to 7.6 today. Patient looks and feels well. Having good UOP of >1L In last 24 hours. Cr up to 8.7 with BUN 84 today.  11/25: Hgb down to 6.5 today, will transfuse one unit and work up for anemia (did have melena recently although normal EGD). Vitals stable. Does have nausea today giving zofran and metoclopramide. 400cc UOP so far today. Cr trending up slightly  11/26: Hgb up to 8.6 after transfusion. UOP 400cc so far this shift (although not sure if all were charted). Cr still uptrending unfortunately to 10.0 from 8.9 but BUN is 90 from 91  11/27: Cr 10.5 today. Potassium 3.7 and BUN 91. 300cc UOP yesterday and 350cc today. Feeling well        Interval History: see above    Review of Systems   Constitutional:  Negative for fatigue and fever.        Just sick of being in hospital   HENT: Negative.     Respiratory:  Negative for chest tightness and shortness of breath.    Cardiovascular:  Negative for chest pain and leg swelling.   Gastrointestinal:  Negative for abdominal pain, constipation, diarrhea and nausea.   Genitourinary:  Negative for difficulty urinating and dysuria.   Musculoskeletal: Negative.    Skin:  Negative for rash.   Neurological:  Negative for light-headedness and headaches.   Hematological:  Does not bruise/bleed easily.   Psychiatric/Behavioral:  Negative for agitation and behavioral problems.    Objective:     Vital Signs (Most Recent):  Temp: 97.6 °F (36.4 °C) (11/28/22 1558)  Pulse: 65 (11/28/22 1558)  Resp: 18 (11/28/22 1558)  BP: (!) 121/59 (11/28/22 1558)  SpO2: 97 % (11/28/22 1558)   Vital Signs (24h Range):  Temp:   [97.6 °F (36.4 °C)-98.6 °F (37 °C)] 97.6 °F (36.4 °C)  Pulse:  [64-74] 65  Resp:  [17-18] 18  SpO2:  [97 %-98 %] 97 %  BP: (108-121)/(52-59) 121/59     Weight: 57.6 kg (126 lb 15.8 oz)  Body mass index is 23.99 kg/m².    Intake/Output Summary (Last 24 hours) at 11/28/2022 1830  Last data filed at 11/28/2022 1800  Gross per 24 hour   Intake 640 ml   Output 400 ml   Net 240 ml        Physical Exam  Constitutional:       General: She is not in acute distress.  HENT:      Head: Normocephalic and atraumatic.      Comments: Trialysis line in place c/d/i     Mouth/Throat:      Mouth: Mucous membranes are moist.      Pharynx: Oropharynx is clear.   Eyes:      General: No scleral icterus.  Cardiovascular:      Rate and Rhythm: Normal rate and regular rhythm.      Heart sounds: No murmur heard.    No gallop.   Pulmonary:      Effort: Pulmonary effort is normal.      Comments: Crackles in bases bilaterally  Abdominal:      General: Bowel sounds are normal. There is no distension.      Tenderness: There is no abdominal tenderness.   Musculoskeletal:      Right lower leg: Edema present.      Left lower leg: Edema present.      Comments: 1+ bilateral LE edema   Skin:     General: Skin is warm and dry.   Neurological:      Mental Status: She is alert and oriented to person, place, and time. Mental status is at baseline.   Psychiatric:         Mood and Affect: Mood normal.         Behavior: Behavior normal.       Significant Labs: All pertinent labs within the past 24 hours have been reviewed.  Recent Lab Results  (Last 5 results in the past 24 hours)        11/28/22  1600   11/28/22  1111   11/28/22  0814   11/28/22  0356   11/27/22  1924        Anion Gap       16         Baso #       0.02         Basophil %       0.2         BUN       100         Calcium       7.4         Chloride       98         CO2       25         Creatinine       10.5         Differential Method       Automated         eGFR       3.5         Eos #        0.0         Eosinophil %       0.1         Glucose       81         Gran # (ANC)       6.9         Gran %       76.6         Hematocrit       26.3         Hemoglobin       8.2         Immature Grans (Abs)       0.13  Comment: Mild elevation in immature granulocytes is non specific and   can be seen in a variety of conditions including stress response,   acute inflammation, trauma and pregnancy. Correlation with other   laboratory and clinical findings is essential.           Immature Granulocytes       1.4         Lymph #       1.3         Lymph %       14.5         Magnesium       1.8         MCH       28.5         MCHC       31.2         MCV       91         Mono #       0.7         Mono %       7.2         MPV       12.2         nRBC       0         Phosphorus       6.6         Platelets       102         POCT Glucose 175   110   101     157       Potassium       3.7         RBC       2.88         RDW       15.0         Sodium       139         WBC       8.99                                Significant Imaging: I have reviewed all pertinent imaging results/findings within the past 24 hours.      Assessment/Plan:      * Microscopic polyangiitis  As of 10/26/22 strongly positive MPO Ab (in contrast to borderline positive PR3), consistent with clinical picture of microscopic polyangiitis with pulmonary, and renal involvement after presenting with acute hypoxemic respiratory failure, hemoptysis, and acute renal failure.  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   3)  CHANGES SUGGESTIVE OF FOCAL  ACUTE PYELONEPHRITIS.   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 Now following PEXIVAS steroid taper. Currently on IV Solumedrol 20 mg daily.   - plan for 20mg IV daily on 11/6 for 14 days (last dose of 20mg equivalent is 11/20/2022).   - apheresis: underwent PLEX 10/26, 10/27, 10/30, 11/1, 11/2, 11/3  - rituximab every 7 days for 4 doses: Dose #1: 10/27, #2 11/3, #3 11/10, and #4 given 11/17  - cyclophosphamide every 14 days for 2 doses: 10/27, 11/10  - Opportunistic Infection ppx: atovaquone 1500mg PO daily  - GI bleed prophylaxis: pantoprazole 40mg daily   11/17 Rheumatology following for steroid dosing and chemotherapeutic agents. on IV steroids due to p.o. intake issues, however can transition to p.o. when swallowing issues reliably resolved - on steroid taper - solumedrol .  rituximab dose due November 17 11/18  switched to prednisone taper by nephrology  11/24: No changes. Having good UOP but Cr still greater than 8. Main concern at this time is whether patient will need to go on dialysis or not. She did get one session two days ago. UOP is improving which is of course encouraging. Has triaylisis line still in place if needed. Nephro following  11/25: No changes, still following BMP and UOP to determine ultimate need for HD. Renal following  11/26: Still no changes, UOP picking up  11/27: Plan for HD tomorrow per nephro, still has left sided trialysis line, and also sodium bicarb and lokelma  11/28: Ultimately no HD today per nephro, likely tomorrow      Primary pauci-immune necrotizing and crescentic  glomerulonephritis  Patient with acute kidney injury likely due to microscopic polyangiitis WILFREDO is currently stable. Labs reviewed- Renal function/electrolytes with Estimated Creatinine Clearance: 3.8 mL/min (A) (based on SCr of 10.5 mg/dL (H)). according to latest data. Monitor urine output and serial BMP and adjust therapy as needed. Avoid nephrotoxins and renally dose meds for GFR listed above.       Gastric reflux  PPI BID  Elevated HOB; feeds changed to bolus and tolerating well  Symptoms initially improved however reoccurrence on 11/13 without significant dietary changes; GI planning for EGD 11/14  TF further adjusted for smaller, more frequent bolus   s/p EGD 11/14; Normal Study      High risk medications (not anticoagulants) long-term use  as above      Anuria    11/17 Nephrology following for HD and  monitoring for renal recovery with some  improvement in UOP in last 2 days. will need PermCath and HD chair set up if no renal  recovery.     11/24: Still following to determine need for HD although anuria has resolved    Gastrointestinal hemorrhage with melena  Starting having hemoptysis and melena with associated acute blood loss anemia earlier in hospital stay. Patient with known internal hemorrhoids, mild sigmoid diverticulosis, history of gastritis and + H pylori (2012 EGD).   - GI consulted 10/19, unable to perform EGD due to instability and respiratory failure at the time    Plan  - patient unable to take PO PPI due to NGT removal on 11/5, transition to IV PPI 40mg pantoprazole daily, return to per NG tube once replaced  - s/p EGD 11/14; Normal Study  - PPI transitioned to BID as concern for GERD  - Monitor CBC closely for signs of recurrent bleed  11/17 Hb trended dopwn to 6.6. Transfusion with 1 unit of PRBC .  111/8  continue protonix oral     11/25: Hgb trending down today and will need 1 u PRBC. Denies further melena. Will check iron panel, B12, folate, retic count in AM, FOBT, and consider  reconsult to GI for consideration of colonoscopy. Also could be contribution from renal disease. HDS  11/28: Hgb 8.2    Dysphagia  SLP following  MBSS showing global weakness in swallowing as well as aspiration with thin liquids.   ENT consulted but patient did not tolerate laryngoscopy     Plan   - Discussed case with Rheumatology team, they are concerned that this dysphagia may have been from prior stroke, requesting imaging for evaluation-  CT demonstrated no acute findings or causes for dysphagia NOR did MRI   - removed NGT and place dobhoff which is more comfortable and makes swallowing easier compared to NGT.    - continue working with speech therapy   -exam concerning for thrush; nystatin swish and swallow initiated; GI consulted as concern that oropharyngeal candidiasis may be contributing to dysphagia  -Per GI, Lower suspicion for esophageal candidiasis without odynophagia however can If white plaques have been seen on oropharynx, ok to start fluconazole empirically for possible esophageal candidiasis  -EGD on 11/14 without abnormality; per GI, Suspect some aspect of esophageal dysmotility in setting of critical illness. No further testing at this time.  -diet initiated per SLP on 11/16; NG tube removed  -continue to monitor p.o. intake closely  11/17 SLP recs Mechanical soft, Liquid Diet Level: Thin   11/22 advanced to renal diet     Moderate malnutrition  Nutrition consulted. Most recent weight and BMI monitored-          Malnutrition (Moderate to Severe)  Weight Loss (Malnutrition): 7.5% in 3 months              Measurements:  Wt Readings from Last 1 Encounters:   11/22/22 57.6 kg (126 lb 15.8 oz)   Body mass index is 23.99 kg/m².    Recommendations: Recommendation/Intervention: 1. Per SLP, remove Dysphagia soft diet restrictions. Continue Renal diet & encourage PO intake as tolerated. 2. If pt agreeable, add Novasource ONS. 3. RD to monitor & follow-up.  Goals: Meet % EEN, EPN by RD f/u  date    Patient has been screened and assessed by RD. RD will follow patient.      Encounter for continuous renal replacement therapy (CRRT) for acute renal failure  Due to microscopic polyangiitis. Remains on hemodialysis intermittently.   - Baseline creatinine 1.0-1.2.   - New onset proteinuria/hematuria.   - Renal biopsy completed and   - Trialysis in place for intermittent Hemodialysis    Plan  - Nephrology consulted, appreciate management  - management of MPA as noted separately  - Renal function panel daily  - renally dose all medications  - intermittent Hemodialysis as indicated, per Nephrology   11/17     Recent Labs   Lab 11/26/22  1408 11/27/22  0720 11/28/22  0356   BUN 94* 91* 100*   CREATININE 9.8* 10.5* 10.5*     . Nephrology following for HD and  monitoring for renal recovery with some  improvement in UOP in last 2 days. will need PermCath and HD chair set up if no renal  recovery.  likely SNF pending nephro decision regarding HD   11/20  mL /24h. BUN/CR trending up to 113/9.8 , started on sodium bicarb for bicarb 19. continue lokelma 5g daily for 5.1   11/23 HD yesterday without ultrafiltration . UOP 500ml/24h   11/24: UOP 1100 cc in 24hr  11/25: So far today urinated 400cc by about 11am  11/26: Still monitoring for RRT needs  11/27: HD tomorrow, starting sodium bicarb, giving lokelma today  11/28: HD did not happen today, plan for tomorrow    Acute hypoxemic respiratory failure  Multifocal pneumonia  Hemoptysis  Dyspnea  Diffuse Alveolar Hemorrahge  In the setting of a new diagnosis of microscopic polyangiitis, presented with fevers, dyspnea,.  - Chest imaging was consistent with diffuse alveolar hemorrhage.  CT chest wc 10/17 with bl perihilar dense consolidations w/ peripheral patcy areas of GGO, BL PNA, less c/f cardiogenic pulmonary edema.  - Patient upgraded to Medical ICU 10/23/22 with abrupt respiratory decline requiring NIPPV, improved with high dose IV steroids, plasmapheresis,  emergent hemodialysis.   - Unable to perform bronchoscopy in the Medical ICU due to tenuous respiratory status  - As of 11/6, hypoxemia has significantly improved    Plan:  - weaned to room air  - continue management of underlying triggers with steroid and immunosuppressants  - incentive spirometry and respiratory hygiene  11/17 sats 92% on RA  11/25: Has been stable on RA for extended period at this time    Lymphadenopathy of head and neck  - Has bilateral neck gland swelling with tenderness,consulted ENT  - No surgical intervention indicated   - Adenopathy likely reactive in nature   - Recommend further workup if it does not resolve within next 2 weeks     Hyponatremia  Has hyponatremia,started on NS. was given 2 grams of sodium chloride tablets TID for likely SIADH  11/18  sodium normalized at 137. salt tablets discontinued.      Other specified anemias  In the setting of GI bleed with melena  - Evaluated by GI, see GI bleed documentation  - Last transfusion 10/28, Hgb slowly trending down since then  - Hgb 6.9 on 11/5      Plan  - Monitor CBC Daily   - Treat with pRBC transfusion PRN Hgb <7  - qualifies for transfusion on 11/5 with Hgb 6.9, 1u pRBC ordered  -stable   11/17 Hb trended dopwn to 6.6. Transfusion with 1 unit of PRBC .consider GI eval  11/24: Stable Hgb and vitals  11/25: Hgb trending down today and will need 1 u PRBC. Denies further melena. Will check iron panel, B12, folate, retic count in AM, FOBT, and consider reconsult to GI for consideration of colonoscopy. Also could be contribution from renal disease. HDS  11/26: Still no stool guaic as she has not stooled but reports brown stool yesterday without melena for a while now. Iron panel c/w anemia of chronic inflammation. Likely also hypoproliferative in the setting of renal disease. Responded well to 1U PRBC yesterday. Think ongoing GIB is unlikely at this time. 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 On room air breathing comfortably      Hyperkalemia    resolved    Primary hypertension  BP Readings from Last 1 Encounters:   11/28/22 (!) 121/59     - Patient is unable to tolerate PO mediations, all meds to be administered via NG tube  -Will continue to monitor blood pressure trend closely and adjust antihypertensive regimen as clinically indicated and tolerated.    amLODIPine tablet 10 mg, 10 mg, Per NG tube, Daily    carvediloL tablet 12.5 mg, 12.5 mg, Per NG tube, BID    hydrALAZINE tablet 25 mg, 25 mg, Per NG tube, Q8H    lisinopriL tablet 40 mg, 40 mg, Per NG tube, Daily    11/24: NG tube has been removed and now taking medications by mouth  11/26: /56, continue current management  11/27: No changes      Hypothyroid  - Continue synthroid 25 mcg  - TSH 0.585 10/27/22      VTE Risk Mitigation (From admission, onward)         Ordered     heparin (porcine) injection 1,000 Units  As needed (PRN)         11/22/22 0832     heparin, porcine (PF) 100 unit/mL injection flush 500 Units  As needed (PRN)         11/17/22 1343     heparin, porcine (PF) 100 unit/mL injection flush 500 Units  As needed (PRN)         11/10/22 1430     heparin (porcine) injection 1,000 Units  As needed (PRN)         11/09/22 1601     heparin (porcine) injection 1,000 Units  As needed (PRN)         11/08/22 0854     Place sequential compression device  Until  discontinued         10/25/22 1731     IP VTE HIGH RISK PATIENT  Once         10/17/22 1354     Reason for No Pharmacological VTE Prophylaxis  Once        Question:  Reasons:  Answer:  Risk of Bleeding    10/17/22 1354                Discharge Planning   ANTHONY: 12/2/2022     Code Status: Full Code   Is the patient medically ready for discharge?: No    Reason for patient still in hospital (select all that apply): Treatment  Discharge Plan A: Skilled Nursing Facility   Discharge Delays: None known at this time              Ej Fregoso MD  Department of Hospital Medicine   [facility] - Intensive Care ([place] Germain-14)

## 2022-11-29 NOTE — ASSESSMENT & PLAN NOTE
In the setting of GI bleed with melena  - Evaluated by GI, see GI bleed documentation  - Last transfusion 10/28, Hgb slowly trending down since then  - Hgb 6.9 on 11/5      Plan  - Monitor CBC Daily   - Treat with pRBC transfusion PRN Hgb <7  - qualifies for transfusion on 11/5 with Hgb 6.9, 1u pRBC ordered  -stable   11/17 Hb trended dopwn to 6.6. Transfusion with 1 unit of PRBC .consider GI eval  11/24: Stable Hgb and vitals  11/25: Hgb trending down today and will need 1 u PRBC. Denies further melena. Will check iron panel, B12, folate, retic count in AM, FOBT, and consider reconsult to GI for consideration of colonoscopy. Also could be contribution from renal disease. HDS  11/26: Still no stool guaic as she has not stooled but reports brown stool yesterday without melena for a while now. Iron panel c/w anemia of chronic inflammation. Likely also hypoproliferative in the setting of renal disease. Responded well to 1U PRBC yesterday. Think ongoing GIB is unlikely at this time. Will continue to follow  11/27: Hgb up to 9.1 today from 8.6    Current CBC reviewed-    Recent Labs   Lab 11/26/22  0302 11/27/22  0720 11/28/22  0356   HGB 8.6* 9.1* 8.2*

## 2022-11-29 NOTE — ASSESSMENT & PLAN NOTE
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  PLEX 10/26, 10/27, 10/29, 10/30, 11/1, 11/2, 11/3 (prior to Rituxan)  Rituximab 375 mg/m^2 (600 mg) on 10/27 11/3, 11/10 and 11/17 (completed 4 doses)  Cyclophosphamide 7.5 mg/kg on 10/27 and 11/10 ( every 14 days ); has completed 2 doses   Serum BUN/Cr slowly trending up; will continue monitoring daily for dialysis needs  Now following PEXIVAS steroid taper; currently on Prednisone 20mg PO daily   Sodium bicarb 650mg PO BID   Continue Atovaquone for prophylaxis  Strict I/Os and chart   Avoid nephrotoxic agents as much as possible  Scheduled for hemodialysis today for metabolic clearance, no UF

## 2022-11-29 NOTE — ASSESSMENT & PLAN NOTE
As of 10/26/22 strongly positive MPO Ab (in contrast to borderline positive PR3), consistent with clinical picture of microscopic polyangiitis with pulmonary, and renal involvement after presenting with acute hypoxemic respiratory failure, hemoptysis, and acute renal failure.  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   3)  CHANGES SUGGESTIVE OF FOCAL ACUTE PYELONEPHRITIS.   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 Now following PEXIVAS steroid taper. Currently on IV Solumedrol 20 mg daily.   - plan for 20mg IV daily on 11/6 for 14 days (last dose of 20mg equivalent is 11/20/2022).   - apheresis: underwent PLEX 10/26, 10/27, 10/30, 11/1, 11/2, 11/3  - rituximab every 7 days for 4 doses: Dose #1: 10/27, #2 11/3, #3 11/10, and #4 given 11/17  - cyclophosphamide every 14 days for 2 doses: 10/27, 11/10  - Opportunistic Infection ppx: atovaquone 1500mg PO daily  - GI bleed prophylaxis: pantoprazole 40mg daily   11/17 Rheumatology following for steroid dosing and chemotherapeutic agents. on IV steroids due to p.o. intake issues, however can transition to p.o. when swallowing issues reliably resolved - on  steroid taper - solumedrol .  rituximab dose due November 17 11/18  switched to prednisone taper by nephrology  11/24: No changes. Having good UOP but Cr still greater than 8. Main concern at this time is whether patient will need to go on dialysis or not. She did get one session two days ago. UOP is improving which is of course encouraging. Has triaylisis line still in place if needed. Nephro following  11/25: No changes, still following BMP and UOP to determine ultimate need for HD. Renal following  11/26: Still no changes, UOP picking up  11/27: Plan for HD tomorrow per nephro, still has left sided trialysis line, and also sodium bicarb and lokelma  11/28: Ultimately no HD today per nephro, likely tomorrow  11/29: HD today, no removal of volume, just for filtration

## 2022-11-29 NOTE — PLAN OF CARE
Recommendations     Remove Cardiac diet restrictions, continue Renal diet.   Add Novasource ONS to aid in caloric intake.  RD to monitor & follow-up.     Goals: Meet % EEN, EPN by RD f/u date  Nutrition Goal Status: progressing towards goal  Communication of RD Recs: reviewed with RN

## 2022-11-29 NOTE — ASSESSMENT & PLAN NOTE
BP Readings from Last 1 Encounters:   11/28/22 (!) 121/59     - Patient is unable to tolerate PO mediations, all meds to be administered via NG tube  -Will continue to monitor blood pressure trend closely and adjust antihypertensive regimen as clinically indicated and tolerated.    amLODIPine tablet 10 mg, 10 mg, Per NG tube, Daily    carvediloL tablet 12.5 mg, 12.5 mg, Per NG tube, BID    hydrALAZINE tablet 25 mg, 25 mg, Per NG tube, Q8H    lisinopriL tablet 40 mg, 40 mg, Per NG tube, Daily    11/24: NG tube has been removed and now taking medications by mouth  11/26: /56, continue current management  11/27: No changes

## 2022-11-29 NOTE — PLAN OF CARE
Problem: Adult Inpatient Plan of Care  Goal: Plan of Care Review  Outcome: Ongoing, Progressing  Goal: Patient-Specific Goal (Individualized)  Outcome: Ongoing, Progressing  Goal: Absence of Hospital-Acquired Illness or Injury  Outcome: Ongoing, Progressing  Goal: Optimal Comfort and Wellbeing  Outcome: Ongoing, Progressing     Problem: Infection  Goal: Absence of Infection Signs and Symptoms  Outcome: Ongoing, Progressing     Problem: Adjustment to Illness (Sepsis/Septic Shock)  Goal: Optimal Coping  Outcome: Ongoing, Progressing     Problem: Bleeding (Sepsis/Septic Shock)  Goal: Absence of Bleeding  Outcome: Ongoing, Progressing     Problem: Glycemic Control Impaired (Sepsis/Septic Shock)  Goal: Blood Glucose Level Within Desired Range  Outcome: Ongoing, Progressing     Problem: Infection Progression (Sepsis/Septic Shock)  Goal: Absence of Infection Signs and Symptoms  Outcome: Ongoing, Progressing     Problem: Nutrition Impaired (Sepsis/Septic Shock)  Goal: Optimal Nutrition Intake  Outcome: Ongoing, Progressing     Problem: Fluid and Electrolyte Imbalance (Acute Kidney Injury/Impairment)  Goal: Fluid and Electrolyte Balance  Outcome: Ongoing, Progressing     Problem: Oral Intake Inadequate (Acute Kidney Injury/Impairment)  Goal: Optimal Nutrition Intake  Outcome: Ongoing, Progressing    Patient Is AAOx4, RA. Did not  report of SOB, discomfort, or pain. She did Ambulate with help x 1 to bathroom and back to bed. Bed low and locked, call light in reach, SR up x 3.       A&Ox4.  VSS.  1 L NC, O2 sat trends down when sleeping.  Reports of L hip and L wrist mild-mod pain, decreased with PRN oxy and ice packs.  L wrist splint in place.  Skin intact, some scattered bruising.  No BMs this shift.  Purewick utilized.  Was able to shift, roll in bed and get to edge of bed with assist of one, but did not get out of bed due to pelvic/L hip pain.  Tolerating regular diet, good appetite. CMS intact.  WBAT.  OT/PT/SW/CM/Ortho surg consulted.

## 2022-11-29 NOTE — PROGRESS NOTES
[facility] - Intensive Care ([address] Medicine  Progress Note    Patient Name: Kristin Goodman  MRN: [id number]  Patient Class: IP- Inpatient   Admission Date: 10/17/2022  Length of Stay: 43 days  Attending Physician: Ej Fregoso MD  Primary Care Provider: Lori Hernandez MD        Subjective:     Principal Problem:Microscopic polyangiitis        HPI:  Kristin Goodman is a 77 y.o. female with a past medical history of HTN, hypothyroidism, HFpEF, and osteoarthritis of the arm who has presented to the ED for cough, SOB, and weakness. Daughter is present at the bedside. Patient presented to the ED on 9/24 with hemoptysis, CT and CXR showed high suspicion for multilobar pneumonia. Patient was discharged with a 10-day course of levofloxacin and an albuterol inhaler. Patient followed up with her PCP on 10/4 with slight improvement in symptoms and went back to work. Patient continued to have worsening symptoms and presented to the ED again on 10/14 for evaluation and no interventions were done. Patient endorses fevers over the last few days up to 102.4 F with progressive SOB. She endorses hemoptysis with moderate amount of blood, generalized weakness, productive cough, SOB, and loss of appetite. Denies chest pain, nausea, vomiting, abdominal pain, or urinary changes.    ED: hypertensive up to 219/93 and tachycardic up to 117. Oxygen saturation on 92% on RA, placed on 5L NC with sats >97%. CBC remarkable for leukocytosis of 16.03 and Hb 7.7. K 3.3. Cr 1.6, baseline ~1.0. . Troponin 0.061. COVID and flu negative. EKG NSR. CT chest with contrast pending at time of admission. Given home amlodipine and lisinopril. Given 500mL NS, IV azithromycin, cefepime and vanc.       Overview/Hospital Course:  Ms. Goodman was admitted to Hospital Medicine for management of multifocal pneumonia and acute hypoxemic respiratory failure after presenting to the ED with fevers, cough, hemoptysis, and dyspnea. She required  escalation to the Medical ICU due to abrupt decline in her respiratory status, associated with hemoptysis and requiring NIPPV. CT chest showed bilateral patchy ground glass opacities. Labs additionally were notable for acute renal failure on CKD3. Pulmonology was consulted while under the care of [facility] and felt this picture was consistent with diffuse alveolar hemorrhage.     Her workup revealed a strongly positive MPO Ab consistent with clinical picture of microscopic polyangiitis with pulmonary and renal involvement. She underwent Trialysis catheter placement 10/25/2022 in the Medical ICU. 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     Rheumatology was consulted, extensive workup revealed MITUL + 1:2560 homogenous, +dsDNA, normal complements, PR3 1.2 (slight +),  (+), cANCA + 1:80, pANCA neg, GBMAb neg, trace cryos. Due to concern for MPA, she was started on pulse dose IV methylprednisolone 1000mg for 3 days, and plasmapheresis under the guidance of Transfusion Medicine. She remains on a steroid taper and has completed PLEX. She additionally received rituximab and cyclophosphamide. OI prophylaxis was provided with atovaquone due to a sulfa allergy.     Nephrology was consulted for evaluation of acute renal failure in the setting of MPA. She did require SLED in the ICU for renal clearance and remains on intermittent hemodialysis. She is expected to continue HD once discharged.     Her acute hypoxemic respiratory failure improved and she was weaned off of supplemental oxygen. She stepped down to [facility] 10/28.     She underwent MBBS for evaluation of dysphagia, which showed global delayed initiation of swallow and aspiration with thin liquids. Due to  concern for possible prior stroke, head imaging was completed and negative for acute finding. She is NPO with NG tube. ENT was consulted for evaluation of dysphagia and cervical adenopathy.  Patient did not tolerate laryngoscopy; unable to visualize vocal cords but given her lack of hoarseness, they did not suspect vocal fold paresis/paralysis. MRI recommended by rheum to fully rule out any potential neurological cause of the patient's dysphagia; but demonstrated   remote left thalamic lacunar type infarct and punctate remote left cerebellar infarcts.  She is continuing to work with speech therapy.  EGD completed 11/14 without abnormalities.  Per GI, Suspect some aspect of esophageal dysmotility in setting of critical illness. No further testing at this time.  Per SLP, diet advanced on 11/15 and NG tube removed.    Her other chronic medical conditions including hypothyroidism, diastolic CHF, and hypertension were managed with her home medications, with dose adjustments as needed.     11/17 Hb trended dopwn to 6.6. Transfusion with 1 unit of PRBC .  Rheumatology following for steroid dosing and chemotherapeutic agents. on IV steroids due to p.o. intake issues, however can transition to p.o. when swallowing issues reliably resolved - on steroid taper - solumedrol   rituximab dose due November 17; has been . Nephrology following for HD and  monitoring for renal recovery with some  improvement in UOP in last 2 days. will need PermCath and HD chair set up if no renal  recovery.  likely SNF pending nephro decision regarding HD . No need for HD today. Continue 2 grams of sodium chloride tablets TID for likely SIADH. SLP recs Mechanical soft, Liquid Diet Level: Thin   11/18  sodium normalized at 137. salt tablets discontinued. Hb at 8.5 s/p 1 U PRBC. UOP 400mL /24h.  K at 5 . switched to prednisone taper by nephrology .started lokelma 5 mg x 3days  11/19  mL /24h. BUN/CR trending up to 102/9.3 ,no HD for now per  nephro  11/20  mL /24h. BUN/CR trending up to 113/9.8 , started on sodium bicarb for bicarb 19. continue lokelma 5g daily for 5.1 .   11/21 nephrology follow up  -No urgent indication for HD today as with no signs of uremic encephalopathy or  O2 requirements  11/22 HD x1 today for uremia   11/23 Hb 7.1 . HD yesterday without ultrafiltration . UOp 500ml/24h   11/24: Hgb up to 7.6 today. Patient looks and feels well. Having good UOP of >1L In last 24 hours. Cr up to 8.7 with BUN 84 today.  11/25: Hgb down to 6.5 today, will transfuse one unit and work up for anemia (did have melena recently although normal EGD). Vitals stable. Does have nausea today giving zofran and metoclopramide. 400cc UOP so far today. Cr trending up slightly  11/26: Hgb up to 8.6 after transfusion. UOP 400cc so far this shift (although not sure if all were charted). Cr still uptrending unfortunately to 10.0 from 8.9 but BUN is 90 from 91  11/27: Cr 10.5 today. Potassium 3.7 and BUN 91. 300cc UOP yesterday and 350cc today. Feeling well  11/29: No issues today. Plan for HD this afternoon, did make 700cc urine today        Interval History: see above    Review of Systems   Constitutional:  Negative for fatigue and fever.        Feeling better than yesterday   HENT: Negative.     Respiratory:  Negative for chest tightness and shortness of breath.    Cardiovascular:  Negative for chest pain and leg swelling.   Gastrointestinal:  Negative for abdominal pain, constipation, diarrhea and nausea.   Genitourinary:  Negative for difficulty urinating and dysuria.   Musculoskeletal: Negative.    Skin:  Negative for rash.   Neurological:  Negative for light-headedness and headaches.   Hematological:  Does not bruise/bleed easily.   Psychiatric/Behavioral:  Negative for agitation and behavioral problems.    Objective:     Vital Signs (Most Recent):  Temp: 97.7 °F (36.5 °C) (11/29/22 1359)  Pulse: 62 (11/29/22 1600)  Resp: 16 (11/29/22 1359)  BP: (!)  118/57 (11/29/22 1600)  SpO2: 98 % (11/29/22 1137)   Vital Signs (24h Range):  Temp:  [97.7 °F (36.5 °C)-98.6 °F (37 °C)] 97.7 °F (36.5 °C)  Pulse:  [62-74] 62  Resp:  [16-18] 16  SpO2:  [93 %-98 %] 98 %  BP: (112-145)/(55-70) 118/57     Weight: 57.6 kg (126 lb 15.8 oz)  Body mass index is 23.99 kg/m².    Intake/Output Summary (Last 24 hours) at 11/29/2022 1611  Last data filed at 11/28/2022 2115  Gross per 24 hour   Intake 400 ml   Output 700 ml   Net -300 ml        Physical Exam  Constitutional:       General: She is not in acute distress.  HENT:      Head: Normocephalic and atraumatic.      Comments: Trialysis line in place c/d/i     Mouth/Throat:      Mouth: Mucous membranes are moist.      Pharynx: Oropharynx is clear.   Eyes:      General: No scleral icterus.  Cardiovascular:      Rate and Rhythm: Normal rate and regular rhythm.      Heart sounds: No murmur heard.    No gallop.   Pulmonary:      Effort: Pulmonary effort is normal.      Comments: Crackles in bases bilaterally  Abdominal:      General: Bowel sounds are normal. There is no distension.      Tenderness: There is no abdominal tenderness.   Musculoskeletal:      Right lower leg: Edema present.      Left lower leg: Edema present.      Comments: 1+ bilateral LE edema   Skin:     General: Skin is warm and dry.   Neurological:      Mental Status: She is alert and oriented to person, place, and time. Mental status is at baseline.   Psychiatric:         Mood and Affect: Mood normal.         Behavior: Behavior normal.       Significant Labs: All pertinent labs within the past 24 hours have been reviewed.  Recent Lab Results         11/29/22  1136   11/29/22  0703   11/29/22  0508   11/28/22  1928        Anion Gap     19         Baso #     0.01         Basophil %     0.1         BUN     98         Calcium     7.5         Chloride     98         CO2     22         Creatinine     11.3         Differential Method     Automated         eGFR     3.2         Eos #      0.0         Eosinophil %     0.0         Glucose     80         Gran # (ANC)     7.2         Gran %     75.4         Hematocrit     25.5         Hemoglobin     8.4         Immature Grans (Abs)     0.11  Comment: Mild elevation in immature granulocytes is non specific and   can be seen in a variety of conditions including stress response,   acute inflammation, trauma and pregnancy. Correlation with other   laboratory and clinical findings is essential.           Immature Granulocytes     1.2         Lymph #     1.5         Lymph %     15.7         Magnesium     1.8         MCH     29.2         MCHC     32.9         MCV     89         Mono #     0.7         Mono %     7.6         MPV     12.4         nRBC     0         Phosphorus     7.2         Platelets     110         POCT Glucose 141   89     168       Potassium     3.8         RBC     2.88         RDW     14.7         Sodium     139         WBC     9.53                 Significant Imaging: I have reviewed all pertinent imaging results/findings within the past 24 hours.      Assessment/Plan:      * Microscopic polyangiitis  As of 10/26/22 strongly positive MPO Ab (in contrast to borderline positive PR3), consistent with clinical picture of microscopic polyangiitis with pulmonary, and renal involvement after presenting with acute hypoxemic respiratory failure, hemoptysis, and acute renal failure.  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   3)  CHANGES SUGGESTIVE OF FOCAL ACUTE PYELONEPHRITIS.   4)  MILD-TO-MODERATE ARTERIONEPHROSCLEROSIS    - Rheumatology consulted, appreciate evaluation and recommendations     - MITUL + 1:2560 homogenous, +dsDNA, normal complements     - PR3 1.2 (slight +),  (+)     - cANCA + 1:80, pANCA neg     - GBMAb neg, trace cryos  - Transfusion Medicine consulted for apheresis  - Nephrology consulted, see separate documentation for WILFREDO      Plan  - Steroids:    - s/p IV Solumedrol 1000 mg x3 doses. Now following PEXIVAS steroid taper. Currently on IV Solumedrol 20 mg daily.   - plan for 20mg IV daily on 11/6 for 14 days (last dose of 20mg equivalent is 11/20/2022).   - apheresis: underwent PLEX 10/26, 10/27, 10/30, 11/1, 11/2, 11/3  - rituximab every 7 days for 4 doses: Dose #1: 10/27, #2 11/3, #3 11/10, and #4 given 11/17  - cyclophosphamide every 14 days for 2 doses: 10/27, 11/10  - Opportunistic Infection ppx: atovaquone 1500mg PO daily  - GI bleed prophylaxis: pantoprazole 40mg daily   11/17 Rheumatology following for steroid dosing and chemotherapeutic agents. on IV steroids due to p.o. intake issues, however can transition to p.o. when swallowing issues reliably resolved - on steroid taper - solumedrol .  rituximab dose due November 17 11/18  switched to prednisone taper by nephrology  11/24: No changes. Having good UOP but Cr still greater than 8. Main concern at this time is whether patient will need to go on dialysis or not. She did get one session two days ago. UOP is improving which is of course encouraging. Has triaylisis line still in place if needed. Nephro following  11/25: No changes, still following BMP and UOP to determine ultimate need for HD. Renal following  11/26: Still no changes, UOP picking up  11/27: Plan for HD tomorrow per nephro, still has left sided trialysis line, and also sodium bicarb and lokelma  11/28: Ultimately no HD today per nephro, likely tomorrow  11/29: HD today, no removal of volume, just for filtration      Primary pauci-immune necrotizing and crescentic  glomerulonephritis  Patient with acute kidney injury likely due to microscopic polyangiitis WILFREDO is currently stable. Labs reviewed- Renal function/electrolytes with Estimated Creatinine Clearance: 3.8 mL/min (A) (based on SCr of 10.5 mg/dL (H)). according to latest data. Monitor urine output and serial BMP and adjust therapy as needed. Avoid nephrotoxins and renally dose meds for GFR listed above.       Gastric reflux  PPI BID  Elevated HOB; feeds changed to bolus and tolerating well  Symptoms initially improved however reoccurrence on 11/13 without significant dietary changes; GI planning for EGD 11/14  TF further adjusted for smaller, more frequent bolus   s/p EGD 11/14; Normal Study      High risk medications (not anticoagulants) long-term use  as above      Anuria    11/17 Nephrology following for HD and  monitoring for renal recovery with some  improvement in UOP in last 2 days. will need PermCath and HD chair set up if no renal  recovery.     11/24: Still following to determine need for HD although anuria has resolved    Gastrointestinal hemorrhage with melena  Starting having hemoptysis and melena with associated acute blood loss anemia earlier in hospital stay. Patient with known internal hemorrhoids, mild sigmoid diverticulosis, history of gastritis and + H pylori (2012 EGD).   - GI consulted 10/19, unable to perform EGD due to instability and respiratory failure at the time    Plan  - patient unable to take PO PPI due to NGT removal on 11/5, transition to IV PPI 40mg pantoprazole daily, return to per NG tube once replaced  - s/p EGD 11/14; Normal Study  - PPI transitioned to BID as concern for GERD  - Monitor CBC closely for signs of recurrent bleed  11/17 Hb trended dopwn to 6.6. Transfusion with 1 unit of PRBC .  111/8  continue protonix oral     11/25: Hgb trending down today and will need 1 u PRBC. Denies further melena. Will check iron panel, B12, folate, retic count in AM, FOBT, and consider  reconsult to GI for consideration of colonoscopy. Also could be contribution from renal disease. HDS  11/28: Hgb 8.2    Dysphagia  SLP following  MBSS showing global weakness in swallowing as well as aspiration with thin liquids.   ENT consulted but patient did not tolerate laryngoscopy     Plan   - Discussed case with Rheumatology team, they are concerned that this dysphagia may have been from prior stroke, requesting imaging for evaluation-  CT demonstrated no acute findings or causes for dysphagia NOR did MRI   - removed NGT and place dobhoff which is more comfortable and makes swallowing easier compared to NGT.    - continue working with speech therapy   -exam concerning for thrush; nystatin swish and swallow initiated; GI consulted as concern that oropharyngeal candidiasis may be contributing to dysphagia  -Per GI, Lower suspicion for esophageal candidiasis without odynophagia however can If white plaques have been seen on oropharynx, ok to start fluconazole empirically for possible esophageal candidiasis  -EGD on 11/14 without abnormality; per GI, Suspect some aspect of esophageal dysmotility in setting of critical illness. No further testing at this time.  -diet initiated per SLP on 11/16; NG tube removed  -continue to monitor p.o. intake closely  11/17 SLP recs Mechanical soft, Liquid Diet Level: Thin   11/22 advanced to renal diet     Moderate malnutrition  Nutrition consulted. Most recent weight and BMI monitored-          Malnutrition (Moderate to Severe)  Weight Loss (Malnutrition): 7.5% in 3 months              Measurements:  Wt Readings from Last 1 Encounters:   11/29/22 57.6 kg (126 lb 15.8 oz)   Body mass index is 23.99 kg/m².    Recommendations: Recommendation/Intervention: 1.  Goals: Meet % EEN, EPN by RD f/u date    Patient has been screened and assessed by RD. RD will follow patient.      Encounter for continuous renal replacement therapy (CRRT) for acute renal failure  Due to microscopic  polyangiitis. Remains on hemodialysis intermittently.   - Baseline creatinine 1.0-1.2.   - New onset proteinuria/hematuria.   - Renal biopsy completed and   - Trialysis in place for intermittent Hemodialysis    Plan  - Nephrology consulted, appreciate management  - management of MPA as noted separately  - Renal function panel daily  - renally dose all medications  - intermittent Hemodialysis as indicated, per Nephrology   11/17     Recent Labs   Lab 11/27/22  0720 11/28/22  0356 11/29/22  0508   BUN 91* 100* 98*   CREATININE 10.5* 10.5* 11.3*     . Nephrology following for HD and  monitoring for renal recovery with some  improvement in UOP in last 2 days. will need PermCath and HD chair set up if no renal  recovery.  likely SNF pending nephro decision regarding HD   11/20  mL /24h. BUN/CR trending up to 113/9.8 , started on sodium bicarb for bicarb 19. continue lokelma 5g daily for 5.1   11/23 HD yesterday without ultrafiltration . UOP 500ml/24h   11/24: UOP 1100 cc in 24hr  11/25: So far today urinated 400cc by about 11am  11/26: Still monitoring for RRT needs  11/27: HD tomorrow, starting sodium bicarb, giving lokelma today  11/28: HD did not happen today, plan for tomorrow    Acute hypoxemic respiratory failure  Multifocal pneumonia  Hemoptysis  Dyspnea  Diffuse Alveolar Hemorrahge  In the setting of a new diagnosis of microscopic polyangiitis, presented with fevers, dyspnea,.  - Chest imaging was consistent with diffuse alveolar hemorrhage.  CT chest wc 10/17 with bl perihilar dense consolidations w/ peripheral patcy areas of GGO, BL PNA, less c/f cardiogenic pulmonary edema.  - Patient upgraded to Medical ICU 10/23/22 with abrupt respiratory decline requiring NIPPV, improved with high dose IV steroids, plasmapheresis, emergent hemodialysis.   - Unable to perform bronchoscopy in the Medical ICU due to tenuous respiratory status  - As of 11/6, hypoxemia has significantly improved    Plan:  - weaned to room  air  - continue management of underlying triggers with steroid and immunosuppressants  - incentive spirometry and respiratory hygiene  11/17 sats 92% on RA  11/25: Has been stable on RA for extended period at this time    Lymphadenopathy of head and neck  - Has bilateral neck gland swelling with tenderness,consulted ENT  - No surgical intervention indicated   - Adenopathy likely reactive in nature   - Recommend further workup if it does not resolve within next 2 weeks     Hyponatremia  Has hyponatremia,started on NS. was given 2 grams of sodium chloride tablets TID for likely SIADH  11/18  sodium normalized at 137. salt tablets discontinued.      Other specified anemias  In the setting of GI bleed with melena  - Evaluated by GI, see GI bleed documentation  - Last transfusion 10/28, Hgb slowly trending down since then  - Hgb 6.9 on 11/5      Plan  - Monitor CBC Daily   - Treat with pRBC transfusion PRN Hgb <7  - qualifies for transfusion on 11/5 with Hgb 6.9, 1u pRBC ordered  -stable   11/17 Hb trended dopwn to 6.6. Transfusion with 1 unit of PRBC .consider GI eval  11/24: Stable Hgb and vitals  11/25: Hgb trending down today and will need 1 u PRBC. Denies further melena. Will check iron panel, B12, folate, retic count in AM, FOBT, and consider reconsult to GI for consideration of colonoscopy. Also could be contribution from renal disease. HDS  11/26: Still no stool guaic as she has not stooled but reports brown stool yesterday without melena for a while now. Iron panel c/w anemia of chronic inflammation. Likely also hypoproliferative in the setting of renal disease. Responded well to 1U PRBC yesterday. Think ongoing GIB is unlikely at this time. Will continue to follow  11/27: Hgb up to 9.1 today from 8.6  11/29: Hgb 8.4 has been stable. Continue EPO thrice weekly    Current CBC reviewed-    Recent Labs   Lab 11/27/22  0720 11/28/22  0356 11/29/22  0508   HGB 9.1* 8.2* 8.4*       Chronic diastolic heart  failure  Pulmonary Hypertension due to left heart disease  TTE (10/2022) EF 65%, G1DD  Home meds: Lisinopril, Toprol        Plan  - Active volume control with intermittent Hemodialysis per Nephrology   - Daily weights (standing if tolerated)  - Strict I/Os  - Fluid restriction 1.5 day  - Cardiac diet w/ 2 g Na restriction    11/25: Does have LE edema as mentioned but more likely secondary to renal failure than heart failure and will continue current management. On room air breathing comfortably      Hyperkalemia    resolved    Primary hypertension  BP Readings from Last 1 Encounters:   11/28/22 (!) 121/59     - Patient is unable to tolerate PO mediations, all meds to be administered via NG tube  -Will continue to monitor blood pressure trend closely and adjust antihypertensive regimen as clinically indicated and tolerated.    amLODIPine tablet 10 mg, 10 mg, Per NG tube, Daily    carvediloL tablet 12.5 mg, 12.5 mg, Per NG tube, BID    hydrALAZINE tablet 25 mg, 25 mg, Per NG tube, Q8H    lisinopriL tablet 40 mg, 40 mg, Per NG tube, Daily    11/24: NG tube has been removed and now taking medications by mouth  11/26: /56, continue current management  11/27: No changes      Hypothyroid  - Continue synthroid 25 mcg  - TSH 0.585 10/27/22      VTE Risk Mitigation (From admission, onward)         Ordered     heparin (porcine) injection 1,000 Units  As needed (PRN)         11/29/22 0857     heparin (porcine) injection 1,000 Units  As needed (PRN)         11/22/22 0832     heparin, porcine (PF) 100 unit/mL injection flush 500 Units  As needed (PRN)         11/17/22 1343     heparin, porcine (PF) 100 unit/mL injection flush 500 Units  As needed (PRN)         11/10/22 1430     heparin (porcine) injection 1,000 Units  As needed (PRN)         11/09/22 1601     heparin (porcine) injection 1,000 Units  As needed (PRN)         11/08/22 0854     Place sequential compression device  Until discontinued         10/25/22 0818      IP VTE HIGH RISK PATIENT  Once         10/17/22 1354     Reason for No Pharmacological VTE Prophylaxis  Once        Question:  Reasons:  Answer:  Risk of Bleeding    10/17/22 1354                Discharge Planning   ANTHONY: 12/2/2022     Code Status: Full Code   Is the patient medically ready for discharge?: No    Reason for patient still in hospital (select all that apply): Treatment  Discharge Plan A: Skilled Nursing Facility   Discharge Delays: None known at this time              Ej Fregoso MD  Department of Hospital Medicine   [facility] - Intensive Care ([place]-14)

## 2022-11-29 NOTE — NURSING
Pt had uneventful night. Vitals remained stable and at baseline. No complaints of pain or discomfort. Will continue to monitor pt remaining of shift.  Daughter helped bathe pt this morning. Central line dressing was changed. Pt went to dialysis around 1300 and has not come back to the floor yet.

## 2022-11-29 NOTE — ASSESSMENT & PLAN NOTE
As of 10/26/22 strongly positive MPO Ab (in contrast to borderline positive PR3), consistent with clinical picture of microscopic polyangiitis with pulmonary, and renal involvement after presenting with acute hypoxemic respiratory failure, hemoptysis, and acute renal failure.  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   3)  CHANGES SUGGESTIVE OF FOCAL ACUTE PYELONEPHRITIS.   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 Now following PEXIVAS steroid taper. Currently on IV Solumedrol 20 mg daily.   - plan for 20mg IV daily on 11/6 for 14 days (last dose of 20mg equivalent is 11/20/2022).   - apheresis: underwent PLEX 10/26, 10/27, 10/30, 11/1, 11/2, 11/3  - rituximab every 7 days for 4 doses: Dose #1: 10/27, #2 11/3, #3 11/10, and #4 given 11/17  - cyclophosphamide every 14 days for 2 doses: 10/27, 11/10  - Opportunistic Infection ppx: atovaquone 1500mg PO daily  - GI bleed prophylaxis: pantoprazole 40mg daily   11/17 Rheumatology following for steroid dosing and chemotherapeutic agents. on IV steroids due to p.o. intake issues, however can transition to p.o. when swallowing issues reliably resolved - on  steroid taper - solumedrol .  rituximab dose due November 17 11/18  switched to prednisone taper by nephrology  11/24: No changes. Having good UOP but Cr still greater than 8. Main concern at this time is whether patient will need to go on dialysis or not. She did get one session two days ago. UOP is improving which is of course encouraging. Has triaylisis line still in place if needed. Nephro following  11/25: No changes, still following BMP and UOP to determine ultimate need for HD. Renal following  11/26: Still no changes, UOP picking up  11/27: Plan for HD tomorrow per nephro, still has left sided trialysis line, and also sodium bicarb and lokelma  11/28: Ultimately no HD today per nephro, likely tomorrow

## 2022-11-29 NOTE — PT/OT/SLP PROGRESS
Occupational Therapy      Patient Name:  Kristin Goodman   MRN:  [id number]    Patient not seen today secondary to  (pt away at .). Will follow-up as appropriate.    11/29/2022

## 2022-11-29 NOTE — PROGRESS NOTES
[facility] - [facility] ([facility]-)  Adult Nutrition  Consult Note    SUMMARY     Recommendations    Remove Cardiac diet restrictions, continue Renal diet.   Add Novasource ONS to aid in caloric intake.  RD to monitor & follow-up.    Goals: Meet % EEN, EPN by RD f/u date  Nutrition Goal Status: progressing towards goal  Communication of RD Recs: reviewed with RN    Assessment and Plan    Moderate malnutrition    Nutrition Problem:  Moderate Protein-Calorie Malnutrition  Malnutrition in the context of Acute Illness/Injury    Related to (etiology):  Inability to consume sufficient energy    Signs and Symptoms (as evidenced by):  Body Fat Depletion: moderate depletion of orbitals and triceps   Muscle Mass Depletion: moderate depletion of temples and clavicle region   Weight Loss: 7.5% x 3 months  Fluid Accumulation: mild    Interventions(treatment strategy):  Collaboration of nutrition care w/ other providers  EN    Nutrition Diagnosis Status:  Continues    Malnutrition Assessment    Weight Loss (Malnutrition): 7.5% in 3 months   Orbital Region (Subcutaneous Fat Loss): moderate depletion  Upper Arm Region (Subcutaneous Fat Loss): moderate depletion   [place] Region (Muscle Loss): moderate depletion  Clavicle Bone Region (Muscle Loss): moderate depletion   Edema (Fluid Accumulation): 2-->mild     Reason for Assessment    Reason For Assessment: RD follow-up  Diagnosis: other (see comments) (Polyangiittis)  Relevant Medical History: HTN, HF  Interdisciplinary Rounds: did not attend    General Information Comments: Per RN documentation - pt tolerating diet w/ fair PO intake. Pt receiving HD. UBW: 150#, per chart review. NFPE complete 10/27 - moderate fat & muscle wasting. Pt meets criteria for moderate malnutrition. Please see PES statement for details.  Nutrition Discharge Planning: Pending clinical course    Nutrition/Diet History    Spiritual, Cultural Beliefs, Advent Practices, Values that Affect Care:  "no  Food Allergies: other (see comments) (Peaches)  Factors Affecting Nutritional Intake: decreased appetite    Anthropometrics    Temp: 98.5 °F (36.9 °C)  Height Method: Stated  Height: 5' 1" (154.9 cm)  Height (inches): 61 in  Weight Method: Stated  Weight: 57.6 kg (126 lb 15.8 oz)  Weight (lb): 126.99 lb  Ideal Body Weight (IBW), Female: 105 lb  % Ideal Body Weight, Female (lb): 120.94 %  BMI (Calculated): 24  BMI Grade: 18.5-24.9 - normal  Usual Body Weight (UBW), k.7 kg  % Usual Body Weight: 92.62  % Weight Change From Usual Weight: -7.57 %    Lab/Procedures/Meds    Pertinent Labs Reviewed: reviewed  Pertinent Labs Comments: BUN 98, Creat 11.3, GFR 3.2, P 7.2  Pertinent Medications Reviewed: reviewed  Pertinent Medications Comments: -    Estimated/Assessed Needs    Weight Used For Calorie Calculations: 57.6 kg (126 lb 15.8 oz)    Energy Calorie Requirements (kcal): 2759-8501 kcal/d  Energy Need Method: Kcal/kg (25-30 kcal/kg)    Protein Requirements: 75 g/d (1.3 g/kg)  Weight Used For Protein Calculations: 57.6 kg (126 lb 15.8 oz)    Estimated Fluid Requirement Method: other (see comments) (Per MD or 1 mL/kcal)  RDA Method (mL): 1440    Nutrition Prescription Ordered    Current Diet Order: Renal, Cardiac  Current Nutrition Support Formula Ordered: Other (Comment) (Discontinued)    Evaluation of Received Nutrient/Fluid Intake    I/O: -3.2L since 11/15    Comments: LBM:     Tolerance: tolerating    Nutrition Risk    Level of Risk/Frequency of Follow-up:  (1x/week)     Monitor and Evaluation    Food and Nutrient Intake: energy intake, food and beverage intake, enteral nutrition intake  Food and Nutrient Adminstration: diet order, enteral and parenteral nutrition administration  Physical Activity and Function: nutrition-related ADLs and IADLs  Anthropometric Measurements: weight, weight change  Biochemical Data, Medical Tests and Procedures: inflammatory profile, lipid profile, glucose/endocrine profile, " gastrointestinal profile, electrolyte and renal panel  Nutrition-Focused Physical Findings: overall appearance     Nutrition Follow-Up    RD Follow-up?: Yes

## 2022-11-29 NOTE — PLAN OF CARE
[facility] - [facility] ([facility])  Discharge Reassessment    Primary Care Provider: Lori Hernandez MD    Expected Discharge Date: 12/2/2022    Reassessment (most recent)       Discharge Reassessment - 11/29/22 1633          Discharge Reassessment    Assessment Type Discharge Planning Reassessment     Did the patient's condition or plan change since previous assessment? No     Discharge Plan discussed with: Patient     Communicated ANTHONY with patient/caregiver Yes     Discharge Plan A Skilled Nursing Facility     Discharge Plan B Skilled Nursing Facility     DME Needed Upon Discharge  other (see comments)   TBD    Discharge Barriers Identified None     Why the patient remains in the hospital Requires continued medical care        Post-Acute Status    Discharge Delays None known at this time                 No issues today. Plan for HD this afternoon, did make 700cc urine today     Irene Ribera LMSW  PRN - [facility]  [phone]

## 2022-11-29 NOTE — ASSESSMENT & PLAN NOTE
Starting having hemoptysis and melena with associated acute blood loss anemia earlier in hospital stay. Patient with known internal hemorrhoids, mild sigmoid diverticulosis, history of gastritis and + H pylori (2012 EGD).   - GI consulted 10/19, unable to perform EGD due to instability and respiratory failure at the time    Plan  - patient unable to take PO PPI due to NGT removal on 11/5, transition to IV PPI 40mg pantoprazole daily, return to per NG tube once replaced  - s/p EGD 11/14; Normal Study  - PPI transitioned to BID as concern for GERD  - Monitor CBC closely for signs of recurrent bleed  11/17 Hb trended dopwn to 6.6. Transfusion with 1 unit of PRBC .  111/8  continue protonix oral     11/25: Hgb trending down today and will need 1 u PRBC. Denies further melena. Will check iron panel, B12, folate, retic count in AM, FOBT, and consider reconsult to GI for consideration of colonoscopy. Also could be contribution from renal disease. HDS  11/28: Hgb 8.2

## 2022-11-29 NOTE — NURSING
Dialysis tx started to the left IJ trialysis cath per orders placed by Dr. CORTNEY Gomez:      Question Answer   Duration of Treatment 3 hours   Dialyzer F160   Dialysate Temperature (C) 36   BFR-As tolerated to a maximum of: 400 mL/min    mL/min   K+ Potassium per Protocol   Ca++ Calcium per Protocol   Na+ 140 MEQ/L   Bicarb 35 meq   Access CVC   Fluid Removal (L): no UD   Tubing 8 mm   Dialysate Bath Solution Protocol (DO NOT MODIFY ANSWER) [email or web address] Dialysate Bath Solution Algorithm (formatted with date).pdf   Antibiotics on HD? No

## 2022-11-29 NOTE — PROGRESS NOTES
[facility] - [facility] ([place] [address])  Nephrology  Progress Note    Patient Name: Kristin Goodman  MRN: [id number]  Admission Date: 10/17/2022  Hospital Length of Stay: 43 days  Attending Provider: Ej Fregoso MD   Primary Care Physician: Lori Hernandez MD  Principal Problem:Microscopic polyangiitis    Subjective:     HPI: Kristin Goodman is a 77 year old female with hypertension, hypothyroidism, HFpEF who presents for cough, shortness of breath, and weakness.  Patient initially presented to ER on 09/24 with hemoptysis and imaging concerning for multilobar pneumonia at which time she was discharged on a 10 day course of levofloxacin.  Patient initially had some improvement but began having worsening symptoms on 10/14.  Patient describes multiple fevers and progressive shortness of breath.  She continues to have intermittent hemoptysis.  Patient with worsening leukocytosis and limited clinical improvement despite broad-spectrum antibiotics.  She remains on cefepime.  Patient is a noted to have baseline creatinine of 1 as recent as 8/2022 but progressive worsening of creatinine in early October.  On admission patient with creatinine of 1.6 (10/17) with subsequent progression and creatinine of 3.0 at time of consultation (10/23).  Nephrology consulted for acute kidney injury.      Interval History: Patient evaluated at bedside. No acute events overnight. Refers feeling well, no complaints this AM. Serum creatinine up to 11.3 this AM. UOP of 700mL/24h.     Review of patient's allergies indicates:   Allergen Reactions    Ampicillin     Peaches [peach (prunus persica)] Other (See Comments)     Pt unable to state type of reaction. Information obtained from daughter who states she was informed of allergy from patient.    Penicillins      Other reaction(s): Hives    Sulfa (sulfonamide antibiotics) Rash and Hives     Current Facility-Administered Medications   Medication Frequency    0.9%  NaCl infusion (for blood  administration) Q24H PRN    0.9%  NaCl infusion (for blood administration) Q24H PRN    0.9%  NaCl infusion (for blood administration) Q24H PRN    0.9%  NaCl infusion Once    0.9%  NaCl infusion Once    acetaminophen tablet 650 mg Q4H PRN    albuterol-ipratropium 2.5 mg-0.5 mg/3 mL nebulizer solution 3 mL Q4H PRN    aluminum-magnesium hydroxide-simethicone 200-200-20 mg/5 mL suspension 30 mL QID PRN    aluminum-magnesium hydroxide-simethicone 200-200-20 mg/5 mL suspension 30 mL Once    And    LIDOcaine HCl 2% oral solution 15 mL Once    amLODIPine tablet 10 mg Daily    atovaquone 750 mg/5 mL oral liquid 1,500 mg Daily    bisacodyL suppository 10 mg Daily PRN    carvediloL tablet 25 mg BID    cloNIDine tablet 0.1 mg Q6H PRN    dextrose 10% bolus 125 mL PRN    dextrose 10% bolus 250 mL PRN    epoetin hira injection 2,880 Units Every Mon, Wed, Fri    glucagon (human recombinant) injection 1 mg PRN    glucose chewable tablet 16 g PRN    glucose chewable tablet 24 g PRN    heparin (porcine) injection 1,000 Units PRN    heparin (porcine) injection 1,000 Units PRN    heparin (porcine) injection 1,000 Units PRN    heparin, porcine (PF) 100 unit/mL injection flush 500 Units PRN    heparin, porcine (PF) 100 unit/mL injection flush 500 Units PRN    hydrALAZINE tablet 100 mg Q8H    insulin aspart U-100 pen 1-10 Units Q6H PRN    levothyroxine tablet 25 mcg Before breakfast    melatonin tablet 6 mg Nightly PRN    methocarbamoL tablet 500 mg TID PRN    metoclopramide HCl injection 5 mg Q6H PRN    naloxone 0.4 mg/mL injection 0.02 mg PRN    ondansetron injection 4 mg Q8H PRN    pantoprazole EC tablet 40 mg BID AC    predniSONE tablet 20 mg Daily    Followed by    [START ON 12/4/2022] predniSONE tablet 12.5 mg Daily    Followed by    [START ON 12/18/2022] predniSONE tablet 10 mg Daily    Followed by    [START ON 1/1/2023] predniSONE tablet 5 mg Daily    prochlorperazine injection Soln 5 mg Q6H PRN    QUEtiapine tablet 25 mg QHS     simethicone chewable tablet 80 mg QID PRN    sodium bicarbonate tablet 650 mg BID    sodium chloride 0.9% 250 mL flush bag 1 time in Clinic/HOD    sodium chloride 0.9% bolus 250 mL PRN    sodium chloride 0.9% bolus 250 mL PRN    sodium chloride 0.9% flush 10 mL PRN    sodium chloride 0.9% flush 10 mL PRN    sodium chloride 0.9% flush 10 mL PRN    sodium chloride 0.9% flush 10 mL PRN    sodium chloride 0.9% flush 5 mL PRN     Facility-Administered Medications Ordered in Other Encounters   Medication Frequency    celecoxib capsule 400 mg Once    fentaNYL 50 mcg/mL injection  mcg PRN    LIDOcaine (PF) 10 mg/ml (1%) injection 10 mg Once PRN    LIDOcaine (PF) 10 mg/ml (1%) injection 10 mg Once    midazolam (VERSED) 1 mg/mL injection 0.5-4 mg PRN    ropivacaine 0.2% Nimbus PainPRO Pump infusion 500 ML Continuous       Objective:     Vital Signs (Most Recent):  Temp: 97.7 °F (36.5 °C) (11/29/22 1359)  Pulse: 66 (11/29/22 1415)  Resp: 16 (11/29/22 1359)  BP: 134/60 (11/29/22 1415)  SpO2: 98 % (11/29/22 1137)  O2 Device (Oxygen Therapy): room air (11/26/22 1515)   Vital Signs (24h Range):  Temp:  [97.6 °F (36.4 °C)-98.6 °F (37 °C)] 97.7 °F (36.5 °C)  Pulse:  [63-74] 66  Resp:  [16-18] 16  SpO2:  [93 %-98 %] 98 %  BP: (112-145)/(55-70) 134/60     Weight: 57.6 kg (126 lb 15.8 oz) (11/29/22 1000)  Body mass index is 23.99 kg/m².  Body surface area is 1.57 meters squared.    I/O last 3 completed shifts:  In: 640 [P.O.:640]  Out: 700 [Urine:700]    Physical Exam  Vitals and nursing note reviewed.   Constitutional:       General: She is awake.      Appearance: She is well-developed. She is ill-appearing. She is not toxic-appearing or diaphoretic.   HENT:      Head: Normocephalic and atraumatic.      Right Ear: External ear normal.      Left Ear: External ear normal.      Nose:      Comments: + NGT     Mouth/Throat:      Mouth: Mucous membranes are dry.   Eyes:      General: Lids are normal. No scleral icterus.        Right  "eye: No discharge.         Left eye: No discharge.   Cardiovascular:      Rate and Rhythm: Normal rate and regular rhythm.   Pulmonary:      Effort: Pulmonary effort is normal.      Breath sounds: Normal breath sounds. No wheezing or rhonchi.   Abdominal:      General: Bowel sounds are normal.      Palpations: Abdomen is soft.      Tenderness: There is no abdominal tenderness.   Musculoskeletal:         General: No deformity.      Cervical back: Normal range of motion and neck supple. No rigidity or tenderness.      Right lower leg: No edema.      Left lower leg: No edema.   Skin:     General: Skin is warm and dry.   Neurological:      General: No focal deficit present.      Mental Status: She is alert and oriented to person, place, and time. Mental status is at baseline.   Psychiatric:         Attention and Perception: Attention normal.         Behavior: Behavior normal.       Significant Labs:  CBC:   Recent Labs   Lab 11/29/22  0508   WBC 9.53   RBC 2.88*   HGB 8.4*   HCT 25.5*   *   MCV 89   MCH 29.2   MCHC 32.9       CMP:   Recent Labs   Lab 11/26/22  1408 11/27/22  0720 11/29/22  0508   *   < > 80   CALCIUM 7.4*   < > 7.5*   ALBUMIN 2.5*  --   --    *   < > 139   K 4.1   < > 3.8   CO2 22*   < > 22*   CL 98   < > 98   BUN 94*   < > 98*   CREATININE 9.8*   < > 11.3*    < > = values in this interval not displayed.       All labs within the past 24 hours have been reviewed.       Assessment/Plan:     * Microscopic polyangiitis  See "Primary pauci-immune necrotizing and crescentic glomerulonephritis"      Primary pauci-immune necrotizing and crescentic glomerulonephritis  Kidney biopsy (10/26): pauci immune necrotizing and crescentic glomerulonephritis   s/p IV Solumedrol 1000 mg x3 doses.  PLEX 10/26, 10/27, 10/29, 10/30, 11/1, 11/2, 11/3 (prior to Rituxan)  Rituximab 375 mg/m^2 (600 mg) on 10/27 11/3, 11/10 and 11/17 (completed 4 doses)  Cyclophosphamide 7.5 mg/kg on 10/27 and 11/10 ( every 14 " days ); has completed 2 doses   Serum BUN/Cr slowly trending up; will continue monitoring daily for dialysis needs  Now following PEXIVAS steroid taper; currently on Prednisone 20mg PO daily   Sodium bicarb 650mg PO BID   Continue Atovaquone for prophylaxis  Strict I/Os and chart   Avoid nephrotoxic agents as much as possible  Scheduled for hemodialysis today for metabolic clearance, no UF               James Gomez MD  Nephrology  [facility] - Intensive Care ([facility]-)    ATTENDING PHYSICIAN ATTESTATION  I have personally verified the history and examined the patient. I thoroughly reviewed the demographic, clinical, laboratorial and imaging information available in medical records. I agree with the assessment and recommendations provided by the subspecialty resident who was under my supervision.

## 2022-11-29 NOTE — SUBJECTIVE & OBJECTIVE
Interval History: see above    Review of Systems   Constitutional:  Negative for fatigue and fever.        Feeling better than yesterday   HENT: Negative.     Respiratory:  Negative for chest tightness and shortness of breath.    Cardiovascular:  Negative for chest pain and leg swelling.   Gastrointestinal:  Negative for abdominal pain, constipation, diarrhea and nausea.   Genitourinary:  Negative for difficulty urinating and dysuria.   Musculoskeletal: Negative.    Skin:  Negative for rash.   Neurological:  Negative for light-headedness and headaches.   Hematological:  Does not bruise/bleed easily.   Psychiatric/Behavioral:  Negative for agitation and behavioral problems.    Objective:     Vital Signs (Most Recent):  Temp: 97.7 °F (36.5 °C) (11/29/22 1359)  Pulse: 62 (11/29/22 1600)  Resp: 16 (11/29/22 1359)  BP: (!) 118/57 (11/29/22 1600)  SpO2: 98 % (11/29/22 1137)   Vital Signs (24h Range):  Temp:  [97.7 °F (36.5 °C)-98.6 °F (37 °C)] 97.7 °F (36.5 °C)  Pulse:  [62-74] 62  Resp:  [16-18] 16  SpO2:  [93 %-98 %] 98 %  BP: (112-145)/(55-70) 118/57     Weight: 57.6 kg (126 lb 15.8 oz)  Body mass index is 23.99 kg/m².    Intake/Output Summary (Last 24 hours) at 11/29/2022 1611  Last data filed at 11/28/2022 2115  Gross per 24 hour   Intake 400 ml   Output 700 ml   Net -300 ml        Physical Exam  Constitutional:       General: She is not in acute distress.  HENT:      Head: Normocephalic and atraumatic.      Comments: Trialysis line in place c/d/i     Mouth/Throat:      Mouth: Mucous membranes are moist.      Pharynx: Oropharynx is clear.   Eyes:      General: No scleral icterus.  Cardiovascular:      Rate and Rhythm: Normal rate and regular rhythm.      Heart sounds: No murmur heard.    No gallop.   Pulmonary:      Effort: Pulmonary effort is normal.      Comments: Crackles in bases bilaterally  Abdominal:      General: Bowel sounds are normal. There is no distension.      Tenderness: There is no abdominal  tenderness.   Musculoskeletal:      Right lower leg: Edema present.      Left lower leg: Edema present.      Comments: 1+ bilateral LE edema   Skin:     General: Skin is warm and dry.   Neurological:      Mental Status: She is alert and oriented to person, place, and time. Mental status is at baseline.   Psychiatric:         Mood and Affect: Mood normal.         Behavior: Behavior normal.       Significant Labs: All pertinent labs within the past 24 hours have been reviewed.  Recent Lab Results         11/29/22  1136   11/29/22  0703   11/29/22  0508   11/28/22  1928        Anion Gap     19         Baso #     0.01         Basophil %     0.1         BUN     98         Calcium     7.5         Chloride     98         CO2     22         Creatinine     11.3         Differential Method     Automated         eGFR     3.2         Eos #     0.0         Eosinophil %     0.0         Glucose     80         Gran # (ANC)     7.2         Gran %     75.4         Hematocrit     25.5         Hemoglobin     8.4         Immature Grans (Abs)     0.11  Comment: Mild elevation in immature granulocytes is non specific and   can be seen in a variety of conditions including stress response,   acute inflammation, trauma and pregnancy. Correlation with other   laboratory and clinical findings is essential.           Immature Granulocytes     1.2         Lymph #     1.5         Lymph %     15.7         Magnesium     1.8         MCH     29.2         MCHC     32.9         MCV     89         Mono #     0.7         Mono %     7.6         MPV     12.4         nRBC     0         Phosphorus     7.2         Platelets     110         POCT Glucose 141   89     168       Potassium     3.8         RBC     2.88         RDW     14.7         Sodium     139         WBC     9.53                 Significant Imaging: I have reviewed all pertinent imaging results/findings within the past 24 hours.

## 2022-11-29 NOTE — NURSING
Spoke to dialysis nurse Andrew. Was informed that pt would not be going to dialysis till the afternoon and was given the okay to give BP meds.

## 2022-11-29 NOTE — ASSESSMENT & PLAN NOTE
Nutrition consulted. Most recent weight and BMI monitored-          Malnutrition (Moderate to Severe)  Weight Loss (Malnutrition): 7.5% in 3 months              Measurements:  Wt Readings from Last 1 Encounters:   11/29/22 57.6 kg (126 lb 15.8 oz)   Body mass index is 23.99 kg/m².    Recommendations: Recommendation/Intervention: 1.  Goals: Meet % EEN, EPN by RD f/u date    Patient has been screened and assessed by RD. RD will follow patient.

## 2022-11-29 NOTE — ASSESSMENT & PLAN NOTE
In the setting of GI bleed with melena  - Evaluated by GI, see GI bleed documentation  - Last transfusion 10/28, Hgb slowly trending down since then  - Hgb 6.9 on 11/5      Plan  - Monitor CBC Daily   - Treat with pRBC transfusion PRN Hgb <7  - qualifies for transfusion on 11/5 with Hgb 6.9, 1u pRBC ordered  -stable   11/17 Hb trended dopwn to 6.6. Transfusion with 1 unit of PRBC .consider GI eval  11/24: Stable Hgb and vitals  11/25: Hgb trending down today and will need 1 u PRBC. Denies further melena. Will check iron panel, B12, folate, retic count in AM, FOBT, and consider reconsult to GI for consideration of colonoscopy. Also could be contribution from renal disease. HDS  11/26: Still no stool guaic as she has not stooled but reports brown stool yesterday without melena for a while now. Iron panel c/w anemia of chronic inflammation. Likely also hypoproliferative in the setting of renal disease. Responded well to 1U PRBC yesterday. Think ongoing GIB is unlikely at this time. Will continue to follow  11/27: Hgb up to 9.1 today from 8.6  11/29: Hgb 8.4 has been stable. Continue EPO thrice weekly    Current CBC reviewed-    Recent Labs   Lab 11/27/22  0720 11/28/22  0356 11/29/22  0508   HGB 9.1* 8.2* 8.4*

## 2022-11-29 NOTE — ASSESSMENT & PLAN NOTE
Due to microscopic polyangiitis. Remains on hemodialysis intermittently.   - Baseline creatinine 1.0-1.2.   - New onset proteinuria/hematuria.   - Renal biopsy completed and   - Trialysis in place for intermittent Hemodialysis    Plan  - Nephrology consulted, appreciate management  - management of MPA as noted separately  - Renal function panel daily  - renally dose all medications  - intermittent Hemodialysis as indicated, per Nephrology   11/17     Recent Labs   Lab 11/27/22  0720 11/28/22  0356 11/29/22  0508   BUN 91* 100* 98*   CREATININE 10.5* 10.5* 11.3*     . Nephrology following for HD and  monitoring for renal recovery with some  improvement in UOP in last 2 days. will need PermCath and HD chair set up if no renal  recovery.  likely SNF pending nephro decision regarding HD   11/20  mL /24h. BUN/CR trending up to 113/9.8 , started on sodium bicarb for bicarb 19. continue lokelma 5g daily for 5.1   11/23 HD yesterday without ultrafiltration . UOP 500ml/24h   11/24: UOP 1100 cc in 24hr  11/25: So far today urinated 400cc by about 11am  11/26: Still monitoring for RRT needs  11/27: HD tomorrow, starting sodium bicarb, giving lokelma today  11/28: HD did not happen today, plan for tomorrow

## 2022-11-30 LAB
ANION GAP SERPL CALC-SCNC: 13 MMOL/L (ref 8–16)
BASOPHILS # BLD AUTO: 0.02 K/UL (ref 0–0.2)
BASOPHILS NFR BLD: 0.2 % (ref 0–1.9)
BUN SERPL-MCNC: 53 MG/DL (ref 8–23)
CALCIUM SERPL-MCNC: 7.7 MG/DL (ref 8.7–10.5)
CHLORIDE SERPL-SCNC: 102 MMOL/L (ref 95–110)
CO2 SERPL-SCNC: 23 MMOL/L (ref 23–29)
CREAT SERPL-MCNC: 6.8 MG/DL (ref 0.5–1.4)
DIFFERENTIAL METHOD: ABNORMAL
EOSINOPHIL # BLD AUTO: 0 K/UL (ref 0–0.5)
EOSINOPHIL NFR BLD: 0.1 % (ref 0–8)
ERYTHROCYTE [DISTWIDTH] IN BLOOD BY AUTOMATED COUNT: 14.9 % (ref 11.5–14.5)
EST. GFR  (NO RACE VARIABLE): 5.9 ML/MIN/1.73 M^2
GLUCOSE SERPL-MCNC: 67 MG/DL (ref 70–110)
HCT VFR BLD AUTO: 27.4 % (ref 37–48.5)
HGB BLD-MCNC: 8.7 G/DL (ref 12–16)
IMM GRANULOCYTES # BLD AUTO: 0.15 K/UL (ref 0–0.04)
IMM GRANULOCYTES NFR BLD AUTO: 1.2 % (ref 0–0.5)
LYMPHOCYTES # BLD AUTO: 1.5 K/UL (ref 1–4.8)
LYMPHOCYTES NFR BLD: 12.4 % (ref 18–48)
MAGNESIUM SERPL-MCNC: 1.7 MG/DL (ref 1.6–2.6)
MCH RBC QN AUTO: 28.9 PG (ref 27–31)
MCHC RBC AUTO-ENTMCNC: 31.8 G/DL (ref 32–36)
MCV RBC AUTO: 91 FL (ref 82–98)
MONOCYTES # BLD AUTO: 1 K/UL (ref 0.3–1)
MONOCYTES NFR BLD: 8 % (ref 4–15)
NEUTROPHILS # BLD AUTO: 9.4 K/UL (ref 1.8–7.7)
NEUTROPHILS NFR BLD: 78.1 % (ref 38–73)
NRBC BLD-RTO: 0 /100 WBC
PHOSPHATE SERPL-MCNC: 4.7 MG/DL (ref 2.7–4.5)
PLATELET # BLD AUTO: 104 K/UL (ref 150–450)
PMV BLD AUTO: 11.6 FL (ref 9.2–12.9)
POCT GLUCOSE: 106 MG/DL (ref 70–110)
POCT GLUCOSE: 124 MG/DL (ref 70–110)
POCT GLUCOSE: 133 MG/DL (ref 70–110)
POCT GLUCOSE: 83 MG/DL (ref 70–110)
POTASSIUM SERPL-SCNC: 4.1 MMOL/L (ref 3.5–5.1)
RBC # BLD AUTO: 3.01 M/UL (ref 4–5.4)
SODIUM SERPL-SCNC: 138 MMOL/L (ref 136–145)
WBC # BLD AUTO: 12.06 K/UL (ref 3.9–12.7)

## 2022-11-30 PROCEDURE — 25000003 PHARM REV CODE 250: Performed by: HOSPITALIST

## 2022-11-30 PROCEDURE — 84100 ASSAY OF PHOSPHORUS: CPT

## 2022-11-30 PROCEDURE — 25000003 PHARM REV CODE 250

## 2022-11-30 PROCEDURE — 97530 THERAPEUTIC ACTIVITIES: CPT

## 2022-11-30 PROCEDURE — 80048 BASIC METABOLIC PNL TOTAL CA: CPT | Performed by: STUDENT IN AN ORGANIZED HEALTH CARE EDUCATION/TRAINING PROGRAM

## 2022-11-30 PROCEDURE — 99231 PR SUBSEQUENT HOSPITAL CARE,LEVL I: ICD-10-PCS | Mod: ,,, | Performed by: STUDENT IN AN ORGANIZED HEALTH CARE EDUCATION/TRAINING PROGRAM

## 2022-11-30 PROCEDURE — 36415 COLL VENOUS BLD VENIPUNCTURE: CPT

## 2022-11-30 PROCEDURE — 99231 SBSQ HOSP IP/OBS SF/LOW 25: CPT | Mod: ,,, | Performed by: STUDENT IN AN ORGANIZED HEALTH CARE EDUCATION/TRAINING PROGRAM

## 2022-11-30 PROCEDURE — 25000003 PHARM REV CODE 250: Performed by: STUDENT IN AN ORGANIZED HEALTH CARE EDUCATION/TRAINING PROGRAM

## 2022-11-30 PROCEDURE — 63600175 PHARM REV CODE 636 W HCPCS: Mod: JG | Performed by: INTERNAL MEDICINE

## 2022-11-30 PROCEDURE — 97164 PT RE-EVAL EST PLAN CARE: CPT

## 2022-11-30 PROCEDURE — 20600001 HC STEP DOWN PRIVATE ROOM

## 2022-11-30 PROCEDURE — 85025 COMPLETE CBC W/AUTO DIFF WBC: CPT | Performed by: STUDENT IN AN ORGANIZED HEALTH CARE EDUCATION/TRAINING PROGRAM

## 2022-11-30 PROCEDURE — 63600175 PHARM REV CODE 636 W HCPCS: Performed by: STUDENT IN AN ORGANIZED HEALTH CARE EDUCATION/TRAINING PROGRAM

## 2022-11-30 PROCEDURE — 83735 ASSAY OF MAGNESIUM: CPT

## 2022-11-30 RX ORDER — SUCRALFATE 1 G/1
1 TABLET ORAL 3 TIMES DAILY PRN
Status: DISCONTINUED | OUTPATIENT
Start: 2022-11-30 | End: 2022-12-13 | Stop reason: HOSPADM

## 2022-11-30 RX ORDER — ALUMINUM HYDROXIDE, MAGNESIUM HYDROXIDE, AND SIMETHICONE 2400; 240; 2400 MG/30ML; MG/30ML; MG/30ML
30 SUSPENSION ORAL EVERY 6 HOURS PRN
Status: DISCONTINUED | OUTPATIENT
Start: 2022-11-30 | End: 2022-11-30 | Stop reason: SDUPTHER

## 2022-11-30 RX ADMIN — ERYTHROPOIETIN 2880 UNITS: 3000 INJECTION, SOLUTION INTRAVENOUS; SUBCUTANEOUS at 09:11

## 2022-11-30 RX ADMIN — PANTOPRAZOLE SODIUM 40 MG: 40 TABLET, DELAYED RELEASE ORAL at 05:11

## 2022-11-30 RX ADMIN — AMLODIPINE BESYLATE 10 MG: 10 TABLET ORAL at 09:11

## 2022-11-30 RX ADMIN — QUETIAPINE FUMARATE 25 MG: 25 TABLET ORAL at 09:11

## 2022-11-30 RX ADMIN — CARVEDILOL 25 MG: 25 TABLET, FILM COATED ORAL at 09:11

## 2022-11-30 RX ADMIN — HYDRALAZINE HYDROCHLORIDE 100 MG: 50 TABLET ORAL at 01:11

## 2022-11-30 RX ADMIN — ALUMINUM HYDROXIDE, MAGNESIUM HYDROXIDE, AND SIMETHICONE 30 ML: 200; 200; 20 SUSPENSION ORAL at 09:11

## 2022-11-30 RX ADMIN — HYDRALAZINE HYDROCHLORIDE 100 MG: 50 TABLET ORAL at 09:11

## 2022-11-30 RX ADMIN — ATOVAQUONE 1500 MG: 750 SUSPENSION ORAL at 09:11

## 2022-11-30 RX ADMIN — PANTOPRAZOLE SODIUM 40 MG: 40 TABLET, DELAYED RELEASE ORAL at 06:11

## 2022-11-30 RX ADMIN — LEVOTHYROXINE SODIUM 25 MCG: 25 TABLET ORAL at 06:11

## 2022-11-30 RX ADMIN — PREDNISONE 20 MG: 20 TABLET ORAL at 09:11

## 2022-11-30 RX ADMIN — SODIUM BICARBONATE 650 MG TABLET 650 MG: at 09:11

## 2022-11-30 RX ADMIN — HYDRALAZINE HYDROCHLORIDE 100 MG: 50 TABLET ORAL at 06:11

## 2022-11-30 RX ADMIN — SUCRALFATE 1 G: 1 TABLET ORAL at 01:11

## 2022-11-30 NOTE — ASSESSMENT & PLAN NOTE
In the setting of GI bleed with melena  - Evaluated by GI, see GI bleed documentation  - Last transfusion 10/28, Hgb slowly trending down since then  - Hgb 6.9 on 11/5      Plan  - Monitor CBC Daily   - Treat with pRBC transfusion PRN Hgb <7  - qualifies for transfusion on 11/5 with Hgb 6.9, 1u pRBC ordered  -stable   11/17 Hb trended dopwn to 6.6. Transfusion with 1 unit of PRBC .consider GI eval  11/24: Stable Hgb and vitals  11/25: Hgb trending down today and will need 1 u PRBC. Denies further melena. Will check iron panel, B12, folate, retic count in AM, FOBT, and consider reconsult to GI for consideration of colonoscopy. Also could be contribution from renal disease. HDS  11/26: Still no stool guaic as she has not stooled but reports brown stool yesterday without melena for a while now. Iron panel c/w anemia of chronic inflammation. Likely also hypoproliferative in the setting of renal disease. Responded well to 1U PRBC yesterday. Think ongoing GIB is unlikely at this time. Will continue to follow  11/27: Hgb up to 9.1 today from 8.6  11/29: Hgb 8.4 has been stable. Continue EPO thrice weekly    Current CBC reviewed-    Recent Labs   Lab 11/28/22  0356 11/29/22  0508 11/30/22  0407   HGB 8.2* 8.4* 8.7*

## 2022-11-30 NOTE — ASSESSMENT & PLAN NOTE
As of 10/26/22 strongly positive MPO Ab (in contrast to borderline positive PR3), consistent with clinical picture of microscopic polyangiitis with pulmonary, and renal involvement after presenting with acute hypoxemic respiratory failure, hemoptysis, and acute renal failure.  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   3)  CHANGES SUGGESTIVE OF FOCAL ACUTE PYELONEPHRITIS.   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 Now following PEXIVAS steroid taper. Currently on IV Solumedrol 20 mg daily.   - plan for 20mg IV daily on 11/6 for 14 days (last dose of 20mg equivalent is 11/20/2022).   - apheresis: underwent PLEX 10/26, 10/27, 10/30, 11/1, 11/2, 11/3  - rituximab every 7 days for 4 doses: Dose #1: 10/27, #2 11/3, #3 11/10, and #4 given 11/17  - cyclophosphamide every 14 days for 2 doses: 10/27, 11/10  - Opportunistic Infection ppx: atovaquone 1500mg PO daily  - GI bleed prophylaxis: pantoprazole 40mg daily   11/17 Rheumatology following for steroid dosing and chemotherapeutic agents. on IV steroids due to p.o. intake issues, however can transition to p.o. when swallowing issues reliably resolved - on  steroid taper - solumedrol .  rituximab dose due November 17 11/18  switched to prednisone taper by nephrology  11/24: No changes. Having good UOP but Cr still greater than 8. Main concern at this time is whether patient will need to go on dialysis or not. She did get one session two days ago. UOP is improving which is of course encouraging. Has triaylisis line still in place if needed. Nephro following  11/25: No changes, still following BMP and UOP to determine ultimate need for HD. Renal following  11/26: Still no changes, UOP picking up  11/27: Plan for HD tomorrow per nephro, still has left sided trialysis line, and also sodium bicarb and lokelma  11/28: Ultimately no HD today per nephro, likely tomorrow  11/29: HD today, no removal of volume, just for filtration  11/30: Rec'd HD. Ultimately the question is whether she will need long term RRT which is difficult to answer at this time. Nephro following

## 2022-11-30 NOTE — ASSESSMENT & PLAN NOTE
- Has bilateral neck gland swelling with tenderness,consulted ENT  - No surgical intervention indicated   - Adenopathy likely reactive in nature   - Recommend further workup if it does not resolve within next 2 weeks     11/30: Adenopathy is still present especially right submandibular region. Nontender now. Will consider heme c/s

## 2022-11-30 NOTE — ASSESSMENT & PLAN NOTE
Pulmonary Hypertension due to left heart disease  TTE (10/2022) EF 65%, G1DD  Home meds: Lisinopril, Toprol        Plan  - Active volume control with intermittent Hemodialysis per Nephrology   - Daily weights (standing if tolerated)  - Strict I/Os  - Fluid restriction 1.5 day  - Cardiac diet w/ 2 g Na restriction    11/25: Does have LE edema as mentioned but more likely secondary to renal failure than heart failure and will continue current management. On room air breathing comfortably  11/30: LE edema unchanged, likely secondary to renal failure, CTM

## 2022-11-30 NOTE — PLAN OF CARE
Problem: Hemodynamic Instability (Hemodialysis)  Goal: Effective Tissue Perfusion  Outcome: Ongoing, Progressing       Dialysis tx ended to the Left IJ dialysis cath, heparin locked and capped.     Net fluid removed: 0    Report given to bob Canales transported by wheelchair from [facility] to room 67673

## 2022-11-30 NOTE — ASSESSMENT & PLAN NOTE
Patient with acute kidney injury likely due to microscopic polyangiitis WILFREDO is currently stable. Labs reviewed- Renal function/electrolytes with Estimated Creatinine Clearance: 5.8 mL/min (A) (based on SCr of 6.8 mg/dL (H)). according to latest data. Monitor urine output and serial BMP and adjust therapy as needed. Avoid nephrotoxins and renally dose meds for GFR listed above.

## 2022-11-30 NOTE — PROGRESS NOTES
[facility] - [facility] ([address] Medicine  Progress Note    Patient Name: Kristin Goodman  MRN: [id number]  Patient Class: IP- Inpatient   Admission Date: 10/17/2022  Length of Stay: 44 days  Attending Physician: Ej Fregoso MD  Primary Care Provider: Lori Hernandez MD        Subjective:     Principal Problem:Microscopic polyangiitis        HPI:  Kristin Goodman is a 77 y.o. female with a past medical history of HTN, hypothyroidism, HFpEF, and osteoarthritis of the arm who has presented to the ED for cough, SOB, and weakness. Daughter is present at the bedside. Patient presented to the ED on 9/24 with hemoptysis, CT and CXR showed high suspicion for multilobar pneumonia. Patient was discharged with a 10-day course of levofloxacin and an albuterol inhaler. Patient followed up with her PCP on 10/4 with slight improvement in symptoms and went back to work. Patient continued to have worsening symptoms and presented to the ED again on 10/14 for evaluation and no interventions were done. Patient endorses fevers over the last few days up to 102.4 F with progressive SOB. She endorses hemoptysis with moderate amount of blood, generalized weakness, productive cough, SOB, and loss of appetite. Denies chest pain, nausea, vomiting, abdominal pain, or urinary changes.    ED: hypertensive up to 219/93 and tachycardic up to 117. Oxygen saturation on 92% on RA, placed on 5L NC with sats >97%. CBC remarkable for leukocytosis of 16.03 and Hb 7.7. K 3.3. Cr 1.6, baseline ~1.0. . Troponin 0.061. COVID and flu negative. EKG NSR. CT chest with contrast pending at time of admission. Given home amlodipine and lisinopril. Given 500mL NS, IV azithromycin, cefepime and vanc.       Overview/Hospital Course:  Ms. Goodman was admitted to Hospital Medicine for management of multifocal pneumonia and acute hypoxemic respiratory failure after presenting to the ED with fevers, cough, hemoptysis, and dyspnea. She required  escalation to the Medical ICU due to abrupt decline in her respiratory status, associated with hemoptysis and requiring NIPPV. CT chest showed bilateral patchy ground glass opacities. Labs additionally were notable for acute renal failure on CKD3. Pulmonology was consulted while under the care of [facility] and felt this picture was consistent with diffuse alveolar hemorrhage.     Her workup revealed a strongly positive MPO Ab consistent with clinical picture of microscopic polyangiitis with pulmonary and renal involvement. She underwent Trialysis catheter placement 10/25/2022 in the Medical ICU. 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     Rheumatology was consulted, extensive workup revealed MITUL + 1:2560 homogenous, +dsDNA, normal complements, PR3 1.2 (slight +),  (+), cANCA + 1:80, pANCA neg, GBMAb neg, trace cryos. Due to concern for MPA, she was started on pulse dose IV methylprednisolone 1000mg for 3 days, and plasmapheresis under the guidance of Transfusion Medicine. She remains on a steroid taper and has completed PLEX. She additionally received rituximab and cyclophosphamide. OI prophylaxis was provided with atovaquone due to a sulfa allergy.     Nephrology was consulted for evaluation of acute renal failure in the setting of MPA. She did require SLED in the ICU for renal clearance and remains on intermittent hemodialysis. She is expected to continue HD once discharged.     Her acute hypoxemic respiratory failure improved and she was weaned off of supplemental oxygen. She stepped down to [facility] 10/28.     She underwent MBBS for evaluation of dysphagia, which showed global delayed initiation of swallow and aspiration with thin liquids. Due to  concern for possible prior stroke, head imaging was completed and negative for acute finding. She is NPO with NG tube. ENT was consulted for evaluation of dysphagia and cervical adenopathy.  Patient did not tolerate laryngoscopy; unable to visualize vocal cords but given her lack of hoarseness, they did not suspect vocal fold paresis/paralysis. MRI recommended by rheum to fully rule out any potential neurological cause of the patient's dysphagia; but demonstrated   remote left thalamic lacunar type infarct and punctate remote left cerebellar infarcts.  She is continuing to work with speech therapy.  EGD completed 11/14 without abnormalities.  Per GI, Suspect some aspect of esophageal dysmotility in setting of critical illness. No further testing at this time.  Per SLP, diet advanced on 11/15 and NG tube removed.    Her other chronic medical conditions including hypothyroidism, diastolic CHF, and hypertension were managed with her home medications, with dose adjustments as needed.     11/17 Hb trended dopwn to 6.6. Transfusion with 1 unit of PRBC .  Rheumatology following for steroid dosing and chemotherapeutic agents. on IV steroids due to p.o. intake issues, however can transition to p.o. when swallowing issues reliably resolved - on steroid taper - solumedrol   rituximab dose due November 17; has been . Nephrology following for HD and  monitoring for renal recovery with some  improvement in UOP in last 2 days. will need PermCath and HD chair set up if no renal  recovery.  likely SNF pending nephro decision regarding HD . No need for HD today. Continue 2 grams of sodium chloride tablets TID for likely SIADH. SLP recs Mechanical soft, Liquid Diet Level: Thin   11/18  sodium normalized at 137. salt tablets discontinued. Hb at 8.5 s/p 1 U PRBC. UOP 400mL /24h.  K at 5 . switched to prednisone taper by nephrology .started lokelma 5 mg x 3days  11/19  mL /24h. BUN/CR trending up to 102/9.3 ,no HD for now per  nephro  11/20  mL /24h. BUN/CR trending up to 113/9.8 , started on sodium bicarb for bicarb 19. continue lokelma 5g daily for 5.1 .   11/21 nephrology follow up  -No urgent indication for HD today as with no signs of uremic encephalopathy or  O2 requirements  11/22 HD x1 today for uremia   11/23 Hb 7.1 . HD yesterday without ultrafiltration . UOp 500ml/24h   11/24: Hgb up to 7.6 today. Patient looks and feels well. Having good UOP of >1L In last 24 hours. Cr up to 8.7 with BUN 84 today.  11/25: Hgb down to 6.5 today, will transfuse one unit and work up for anemia (did have melena recently although normal EGD). Vitals stable. Does have nausea today giving zofran and metoclopramide. 400cc UOP so far today. Cr trending up slightly  11/26: Hgb up to 8.6 after transfusion. UOP 400cc so far this shift (although not sure if all were charted). Cr still uptrending unfortunately to 10.0 from 8.9 but BUN is 90 from 91  11/27: Cr 10.5 today. Potassium 3.7 and BUN 91. 300cc UOP yesterday and 350cc today. Feeling well  11/29: No issues today. Plan for HD this afternoon, did make 700cc urine today  11/30: Received HD. Still urinating a bit although today forgot to collect in hat. Still with edema. Breathing is good. Trialysis line still in place. Renal following to determine need for long term RRT        Interval History: see above    Review of Systems   Constitutional:  Negative for fatigue and fever.        Feeling well   HENT: Negative.     Respiratory:  Negative for chest tightness and shortness of breath.    Cardiovascular:  Positive for leg swelling. Negative for chest pain.   Gastrointestinal:  Negative for abdominal pain, constipation, diarrhea and nausea.   Genitourinary:  Negative for difficulty urinating and dysuria.   Musculoskeletal: Negative.    Skin:  Negative for rash.   Neurological:  Negative for light-headedness and headaches.   Hematological:  Does not bruise/bleed easily.   Psychiatric/Behavioral:   Negative for agitation and behavioral problems.    Objective:     Vital Signs (Most Recent):  Temp: 98 °F (36.7 °C) (11/30/22 1128)  Pulse: 71 (11/30/22 1128)  Resp: 19 (11/30/22 1128)  BP: (!) 117/59 (11/30/22 1128)  SpO2: 97 % (11/30/22 1128)   Vital Signs (24h Range):  Temp:  [97.4 °F (36.3 °C)-98.5 °F (36.9 °C)] 98 °F (36.7 °C)  Pulse:  [60-95] 71  Resp:  [15-19] 19  SpO2:  [91 %-97 %] 97 %  BP: (103-145)/(55-69) 117/59     Weight: 57.6 kg (126 lb 15.8 oz)  Body mass index is 23.99 kg/m².    Intake/Output Summary (Last 24 hours) at 11/30/2022 1144  Last data filed at 11/29/2022 1721  Gross per 24 hour   Intake 300 ml   Output 803 ml   Net -503 ml        Physical Exam  Constitutional:       General: She is not in acute distress.  HENT:      Head: Normocephalic and atraumatic.      Comments: Trialysis line in place c/d/i     Mouth/Throat:      Mouth: Mucous membranes are moist.      Pharynx: Oropharynx is clear.      Comments: Nontender lymphadenopathy is noted in the neck especially the right submandibular region  Eyes:      General: No scleral icterus.  Cardiovascular:      Rate and Rhythm: Normal rate and regular rhythm.      Heart sounds: No murmur heard.    No gallop.   Pulmonary:      Effort: Pulmonary effort is normal.      Comments: Crackles in bases bilaterally  Abdominal:      General: Bowel sounds are normal. There is no distension.      Tenderness: There is no abdominal tenderness.   Musculoskeletal:      Right lower leg: Edema present.      Left lower leg: Edema present.      Comments: 1+ bilateral LE edema   Skin:     General: Skin is warm and dry.   Neurological:      Mental Status: She is alert and oriented to person, place, and time. Mental status is at baseline.   Psychiatric:         Mood and Affect: Mood normal.         Behavior: Behavior normal.       Significant Labs: All pertinent labs within the past 24 hours have been reviewed.  Recent Lab Results         11/30/22  1129   11/30/22  8045    11/30/22  0407   11/29/22 2020        Anion Gap     13         Baso #     0.02         Basophil %     0.2         BUN     53         Calcium     7.7         Chloride     102         CO2     23         Creatinine     6.8         Differential Method     Automated         eGFR     5.9         Eos #     0.0         Eosinophil %     0.1         Glucose     67         Gran # (ANC)     9.4         Gran %     78.1         Hematocrit     27.4         Hemoglobin     8.7         Immature Grans (Abs)     0.15  Comment: Mild elevation in immature granulocytes is non specific and   can be seen in a variety of conditions including stress response,   acute inflammation, trauma and pregnancy. Correlation with other   laboratory and clinical findings is essential.           Immature Granulocytes     1.2         Lymph #     1.5         Lymph %     12.4         Magnesium     1.7         MCH     28.9         MCHC     31.8         MCV     91         Mono #     1.0         Mono %     8.0         MPV     11.6         nRBC     0         Phosphorus     4.7         Platelets     104         POCT Glucose 106   83     127       Potassium     4.1  Comment: *Result may be interfered by visible hemolysis         RBC     3.01         RDW     14.9         Sodium     138         WBC     12.06                 Significant Imaging: I have reviewed all pertinent imaging results/findings within the past 24 hours.      Assessment/Plan:      * Microscopic polyangiitis  As of 10/26/22 strongly positive MPO Ab (in contrast to borderline positive PR3), consistent with clinical picture of microscopic polyangiitis with pulmonary, and renal involvement after presenting with acute hypoxemic respiratory failure, hemoptysis, and acute renal failure.  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   3)  CHANGES SUGGESTIVE OF FOCAL ACUTE PYELONEPHRITIS.   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 Now following PEXIVAS steroid taper. Currently on IV Solumedrol 20 mg daily.   - plan for 20mg IV daily on 11/6 for 14 days (last dose of 20mg equivalent is 11/20/2022).   - apheresis: underwent PLEX 10/26, 10/27, 10/30, 11/1, 11/2, 11/3  - rituximab every 7 days for 4 doses: Dose #1: 10/27, #2 11/3, #3 11/10, and #4 given 11/17  - cyclophosphamide every 14 days for 2 doses: 10/27, 11/10  - Opportunistic Infection ppx: atovaquone 1500mg PO daily  - GI bleed prophylaxis: pantoprazole 40mg daily   11/17 Rheumatology following for steroid dosing and chemotherapeutic agents. on IV steroids due to p.o. intake issues, however can transition to p.o. when swallowing issues reliably resolved - on steroid taper - solumedrol .  rituximab dose due November 17 11/18  switched to prednisone taper by nephrology  11/24: No changes. Having good UOP but Cr still greater than 8. Main concern at this time is whether patient will need to go on dialysis or not. She did get one session two days ago. UOP is improving which is of course encouraging. Has triaylisis line still in place if needed. Nephro following  11/25: No changes, still following BMP and UOP to determine ultimate need for HD.  Renal following  11/26: Still no changes, UOP picking up  11/27: Plan for HD tomorrow per nephro, still has left sided trialysis line, and also sodium bicarb and lokelma  11/28: Ultimately no HD today per nephro, likely tomorrow  11/29: HD today, no removal of volume, just for filtration  11/30: Rec'd HD. Ultimately the question is whether she will need long term RRT which is difficult to answer at this time. Nephro following      Primary pauci-immune necrotizing and crescentic glomerulonephritis  Patient with acute kidney injury likely due to microscopic polyangiitis WILFREDO is currently stable. Labs reviewed- Renal function/electrolytes with Estimated Creatinine Clearance: 5.8 mL/min (A) (based on SCr of 6.8 mg/dL (H)). according to latest data. Monitor urine output and serial BMP and adjust therapy as needed. Avoid nephrotoxins and renally dose meds for GFR listed above.       Gastric reflux  PPI BID  Elevated HOB; feeds changed to bolus and tolerating well  Symptoms initially improved however reoccurrence on 11/13 without significant dietary changes; GI planning for EGD 11/14  TF further adjusted for smaller, more frequent bolus   s/p EGD 11/14; Normal Study      High risk medications (not anticoagulants) long-term use  as above      Anuria    11/17 Nephrology following for HD and  monitoring for renal recovery with some  improvement in UOP in last 2 days. will need PermCath and HD chair set up if no renal  recovery.     11/24: Still following to determine need for HD although anuria has resolved    Gastrointestinal hemorrhage with melena  Starting having hemoptysis and melena with associated acute blood loss anemia earlier in hospital stay. Patient with known internal hemorrhoids, mild sigmoid diverticulosis, history of gastritis and + H pylori (2012 EGD).   - GI consulted 10/19, unable to perform EGD due to instability and respiratory failure at the time    Plan  - patient unable to take PO PPI due to NGT removal  on 11/5, transition to IV PPI 40mg pantoprazole daily, return to per NG tube once replaced  - s/p EGD 11/14; Normal Study  - PPI transitioned to BID as concern for GERD  - Monitor CBC closely for signs of recurrent bleed  11/17 Hb trended dopwn to 6.6. Transfusion with 1 unit of PRBC .  111/8  continue protonix oral     11/25: Hgb trending down today and will need 1 u PRBC. Denies further melena. Will check iron panel, B12, folate, retic count in AM, FOBT, and consider reconsult to GI for consideration of colonoscopy. Also could be contribution from renal disease. HDS  11/28: Hgb 8.2    Dysphagia  SLP following  MBSS showing global weakness in swallowing as well as aspiration with thin liquids.   ENT consulted but patient did not tolerate laryngoscopy     Plan   - Discussed case with Rheumatology team, they are concerned that this dysphagia may have been from prior stroke, requesting imaging for evaluation-  CT demonstrated no acute findings or causes for dysphagia NOR did MRI   - removed NGT and place dobhoff which is more comfortable and makes swallowing easier compared to NGT.    - continue working with speech therapy   -exam concerning for thrush; nystatin swish and swallow initiated; GI consulted as concern that oropharyngeal candidiasis may be contributing to dysphagia  -Per GI, Lower suspicion for esophageal candidiasis without odynophagia however can If white plaques have been seen on oropharynx, ok to start fluconazole empirically for possible esophageal candidiasis  -EGD on 11/14 without abnormality; per GI, Suspect some aspect of esophageal dysmotility in setting of critical illness. No further testing at this time.  -diet initiated per SLP on 11/16; NG tube removed  -continue to monitor p.o. intake closely  11/17 SLP recs Mechanical soft, Liquid Diet Level: Thin   11/22 advanced to renal diet     Moderate malnutrition  Nutrition consulted. Most recent weight and BMI monitored-          Malnutrition  (Moderate to Severe)  Weight Loss (Malnutrition): 7.5% in 3 months              Measurements:  Wt Readings from Last 1 Encounters:   11/29/22 57.6 kg (126 lb 15.8 oz)   Body mass index is 23.99 kg/m².    Recommendations: Recommendation/Intervention: 1.  Goals: Meet % EEN, EPN by RD f/u date    Patient has been screened and assessed by RD. RD will follow patient.      Encounter for continuous renal replacement therapy (CRRT) for acute renal failure  Due to microscopic polyangiitis. Remains on hemodialysis intermittently.   - Baseline creatinine 1.0-1.2.   - New onset proteinuria/hematuria.   - Renal biopsy completed and   - Trialysis in place for intermittent Hemodialysis    Plan  - Nephrology consulted, appreciate management  - management of MPA as noted separately  - Renal function panel daily  - renally dose all medications  - intermittent Hemodialysis as indicated, per Nephrology   11/17     Recent Labs   Lab 11/28/22  0356 11/29/22  0508 11/30/22  0407   * 98* 53*   CREATININE 10.5* 11.3* 6.8*     . Nephrology following for HD and  monitoring for renal recovery with some  improvement in UOP in last 2 days. will need PermCath and HD chair set up if no renal  recovery.  likely SNF pending nephro decision regarding HD   11/20  mL /24h. BUN/CR trending up to 113/9.8 , started on sodium bicarb for bicarb 19. continue lokelma 5g daily for 5.1   11/23 HD yesterday without ultrafiltration . UOP 500ml/24h   11/24: UOP 1100 cc in 24hr  11/25: So far today urinated 400cc by about 11am  11/26: Still monitoring for RRT needs  11/27: HD tomorrow, starting sodium bicarb, giving lokelma today  11/28: HD did not happen today, plan for tomorrow    See above for further details    Acute hypoxemic respiratory failure  Multifocal pneumonia  Hemoptysis  Dyspnea  Diffuse Alveolar Hemorrahge  In the setting of a new diagnosis of microscopic polyangiitis, presented with fevers, dyspnea,.  - Chest imaging was  consistent with diffuse alveolar hemorrhage.  CT chest wc 10/17 with bl perihilar dense consolidations w/ peripheral patcy areas of GGO, BL PNA, less c/f cardiogenic pulmonary edema.  - Patient upgraded to Medical ICU 10/23/22 with abrupt respiratory decline requiring NIPPV, improved with high dose IV steroids, plasmapheresis, emergent hemodialysis.   - Unable to perform bronchoscopy in the Medical ICU due to tenuous respiratory status  - As of 11/6, hypoxemia has significantly improved    Plan:  - weaned to room air  - continue management of underlying triggers with steroid and immunosuppressants  - incentive spirometry and respiratory hygiene  11/17 sats 92% on RA  11/25: Has been stable on RA for extended period at this time    Lymphadenopathy of head and neck  - Has bilateral neck gland swelling with tenderness,consulted ENT  - No surgical intervention indicated   - Adenopathy likely reactive in nature   - Recommend further workup if it does not resolve within next 2 weeks     11/30: Adenopathy is still present especially right submandibular region. Nontender now. Will consider heme c/s    Hyponatremia  Has hyponatremia,started on NS. was given 2 grams of sodium chloride tablets TID for likely SIADH  11/18  sodium normalized at 137. salt tablets discontinued.      Other specified anemias  In the setting of GI bleed with melena  - Evaluated by GI, see GI bleed documentation  - Last transfusion 10/28, Hgb slowly trending down since then  - Hgb 6.9 on 11/5      Plan  - Monitor CBC Daily   - Treat with pRBC transfusion PRN Hgb <7  - qualifies for transfusion on 11/5 with Hgb 6.9, 1u pRBC ordered  -stable   11/17 Hb trended dopwn to 6.6. Transfusion with 1 unit of PRBC .consider GI eval  11/24: Stable Hgb and vitals  11/25: Hgb trending down today and will need 1 u PRBC. Denies further melena. Will check iron panel, B12, folate, retic count in AM, FOBT, and consider reconsult to GI for consideration of colonoscopy.  Also could be contribution from renal disease. HDS  11/26: Still no stool guaic as she has not stooled but reports brown stool yesterday without melena for a while now. Iron panel c/w anemia of chronic inflammation. Likely also hypoproliferative in the setting of renal disease. Responded well to 1U PRBC yesterday. Think ongoing GIB is unlikely at this time. Will continue to follow  11/27: Hgb up to 9.1 today from 8.6  11/29: Hgb 8.4 has been stable. Continue EPO thrice weekly    Current CBC reviewed-    Recent Labs   Lab 11/28/22  0356 11/29/22  0508 11/30/22  0407   HGB 8.2* 8.4* 8.7*       Chronic diastolic heart failure  Pulmonary Hypertension due to left heart disease  TTE (10/2022) EF 65%, G1DD  Home meds: Lisinopril, Toprol        Plan  - Active volume control with intermittent Hemodialysis per Nephrology   - Daily weights (standing if tolerated)  - Strict I/Os  - Fluid restriction 1.5 day  - Cardiac diet w/ 2 g Na restriction    11/25: Does have LE edema as mentioned but more likely secondary to renal failure than heart failure and will continue current management. On room air breathing comfortably  11/30: LE edema unchanged, likely secondary to renal failure, CTM    Hyperkalemia    resolved    Primary hypertension  BP Readings from Last 1 Encounters:   11/30/22 (!) 117/59     - Patient is unable to tolerate PO mediations, all meds to be administered via NG tube  -Will continue to monitor blood pressure trend closely and adjust antihypertensive regimen as clinically indicated and tolerated.    amLODIPine tablet 10 mg, 10 mg, Per NG tube, Daily    carvediloL tablet 12.5 mg, 12.5 mg, Per NG tube, BID    hydrALAZINE tablet 25 mg, 25 mg, Per NG tube, Q8H    lisinopriL tablet 40 mg, 40 mg, Per NG tube, Daily    11/24: NG tube has been removed and now taking medications by mouth  11/26: /56, continue current management  11/27: No changes      Hypothyroid  - Continue synthroid 25 mcg  - TSH 0.585  10/27/22      VTE Risk Mitigation (From admission, onward)         Ordered     heparin (porcine) injection 1,000 Units  As needed (PRN)         11/29/22 0857     heparin (porcine) injection 1,000 Units  As needed (PRN)         11/22/22 0832     heparin, porcine (PF) 100 unit/mL injection flush 500 Units  As needed (PRN)         11/17/22 1343     heparin, porcine (PF) 100 unit/mL injection flush 500 Units  As needed (PRN)         11/10/22 1430     heparin (porcine) injection 1,000 Units  As needed (PRN)         11/09/22 1601     heparin (porcine) injection 1,000 Units  As needed (PRN)         11/08/22 0854     Place sequential compression device  Until discontinued         10/25/22 1731     IP VTE HIGH RISK PATIENT  Once         10/17/22 1354     Reason for No Pharmacological VTE Prophylaxis  Once        Question:  Reasons:  Answer:  Risk of Bleeding    10/17/22 1354                Discharge Planning   ANTHONY: 12/8/2022     Code Status: Full Code   Is the patient medically ready for discharge?: No    Reason for patient still in hospital (select all that apply): Treatment  Discharge Plan A: Skilled Nursing Facility   Discharge Delays: None known at this time              Ej Fregoso MD  Department of Hospital Medicine   [facility] - Intensive Care ([place])

## 2022-11-30 NOTE — NURSING
Pt had uneventful night. Vitals remained stable and at baseline. No complaints of pain or discomfort. Will continue to monitor pt remaining of shift.

## 2022-11-30 NOTE — PLAN OF CARE
PT Re-evaluation completed and PT POC established.    Problem: Physical Therapy  Goal: Physical Therapy Goal  Description: Goals remain appropriate and extended to: 2022     Patient will increase functional independence with mobility by performin. Supine to sit with contact guard assistance  2. Sit to supine with contact guard assistance MET   2a. Sit to supine independently  3. Sit to stand transfer with minimum assistance MET   3a. STS supvn LRAD  4. Gait  x 40 feet with minimum assistance using LRAD as needed  5. Lower extremity exercise program x10 reps per handout, with independence   Outcome: Ongoing, Progressing

## 2022-11-30 NOTE — SUBJECTIVE & OBJECTIVE
Interval History: see above    Review of Systems   Constitutional:  Negative for fatigue and fever.        Feeling well   HENT: Negative.     Respiratory:  Negative for chest tightness and shortness of breath.    Cardiovascular:  Positive for leg swelling. Negative for chest pain.   Gastrointestinal:  Negative for abdominal pain, constipation, diarrhea and nausea.   Genitourinary:  Negative for difficulty urinating and dysuria.   Musculoskeletal: Negative.    Skin:  Negative for rash.   Neurological:  Negative for light-headedness and headaches.   Hematological:  Does not bruise/bleed easily.   Psychiatric/Behavioral:  Negative for agitation and behavioral problems.    Objective:     Vital Signs (Most Recent):  Temp: 98 °F (36.7 °C) (11/30/22 1128)  Pulse: 71 (11/30/22 1128)  Resp: 19 (11/30/22 1128)  BP: (!) 117/59 (11/30/22 1128)  SpO2: 97 % (11/30/22 1128)   Vital Signs (24h Range):  Temp:  [97.4 °F (36.3 °C)-98.5 °F (36.9 °C)] 98 °F (36.7 °C)  Pulse:  [60-95] 71  Resp:  [15-19] 19  SpO2:  [91 %-97 %] 97 %  BP: (103-145)/(55-69) 117/59     Weight: 57.6 kg (126 lb 15.8 oz)  Body mass index is 23.99 kg/m².    Intake/Output Summary (Last 24 hours) at 11/30/2022 1144  Last data filed at 11/29/2022 1721  Gross per 24 hour   Intake 300 ml   Output 803 ml   Net -503 ml        Physical Exam  Constitutional:       General: She is not in acute distress.  HENT:      Head: Normocephalic and atraumatic.      Comments: Trialysis line in place c/d/i     Mouth/Throat:      Mouth: Mucous membranes are moist.      Pharynx: Oropharynx is clear.      Comments: Nontender lymphadenopathy is noted in the neck especially the right submandibular region  Eyes:      General: No scleral icterus.  Cardiovascular:      Rate and Rhythm: Normal rate and regular rhythm.      Heart sounds: No murmur heard.    No gallop.   Pulmonary:      Effort: Pulmonary effort is normal.      Comments: Crackles in bases bilaterally  Abdominal:      General:  Bowel sounds are normal. There is no distension.      Tenderness: There is no abdominal tenderness.   Musculoskeletal:      Right lower leg: Edema present.      Left lower leg: Edema present.      Comments: 1+ bilateral LE edema   Skin:     General: Skin is warm and dry.   Neurological:      Mental Status: She is alert and oriented to person, place, and time. Mental status is at baseline.   Psychiatric:         Mood and Affect: Mood normal.         Behavior: Behavior normal.       Significant Labs: All pertinent labs within the past 24 hours have been reviewed.  Recent Lab Results         11/30/22  1129   11/30/22  0733   11/30/22  0407   11/29/22 2020        Anion Gap     13         Baso #     0.02         Basophil %     0.2         BUN     53         Calcium     7.7         Chloride     102         CO2     23         Creatinine     6.8         Differential Method     Automated         eGFR     5.9         Eos #     0.0         Eosinophil %     0.1         Glucose     67         Gran # (ANC)     9.4         Gran %     78.1         Hematocrit     27.4         Hemoglobin     8.7         Immature Grans (Abs)     0.15  Comment: Mild elevation in immature granulocytes is non specific and   can be seen in a variety of conditions including stress response,   acute inflammation, trauma and pregnancy. Correlation with other   laboratory and clinical findings is essential.           Immature Granulocytes     1.2         Lymph #     1.5         Lymph %     12.4         Magnesium     1.7         MCH     28.9         MCHC     31.8         MCV     91         Mono #     1.0         Mono %     8.0         MPV     11.6         nRBC     0         Phosphorus     4.7         Platelets     104         POCT Glucose 106   83     127       Potassium     4.1  Comment: *Result may be interfered by visible hemolysis         RBC     3.01         RDW     14.9         Sodium     138         WBC     12.06                 Significant Imaging: I  have reviewed all pertinent imaging results/findings within the past 24 hours.

## 2022-11-30 NOTE — PLAN OF CARE
"Patient did not void all shift. Bladder scanned at this time. 117ml in bladder pt states "I dont have to pee right now". Patient is continent and uses bedside toilet. Patient is alert and oriented x 3. Safety measures remain in place and call light within reach.     Problem: Adult Inpatient Plan of Care  Goal: Plan of Care Review  Outcome: Ongoing, Progressing  Goal: Patient-Specific Goal (Individualized)  Outcome: Ongoing, Progressing  Goal: Absence of Hospital-Acquired Illness or Injury  Outcome: Ongoing, Progressing  Goal: Optimal Comfort and Wellbeing  Outcome: Ongoing, Progressing  Goal: Readiness for Transition of Care  Outcome: Ongoing, Progressing     Problem: Infection  Goal: Absence of Infection Signs and Symptoms  Outcome: Ongoing, Progressing     Problem: Adjustment to Illness (Sepsis/Septic Shock)  Goal: Optimal Coping  Outcome: Ongoing, Progressing     Problem: Bleeding (Sepsis/Septic Shock)  Goal: Absence of Bleeding  Outcome: Ongoing, Progressing     Problem: Glycemic Control Impaired (Sepsis/Septic Shock)  Goal: Blood Glucose Level Within Desired Range  Outcome: Ongoing, Progressing     Problem: Infection Progression (Sepsis/Septic Shock)  Goal: Absence of Infection Signs and Symptoms  Outcome: Ongoing, Progressing     Problem: Nutrition Impaired (Sepsis/Septic Shock)  Goal: Optimal Nutrition Intake  Outcome: Ongoing, Progressing     Problem: Fluid and Electrolyte Imbalance (Acute Kidney Injury/Impairment)  Goal: Fluid and Electrolyte Balance  Outcome: Ongoing, Progressing     Problem: Oral Intake Inadequate (Acute Kidney Injury/Impairment)  Goal: Optimal Nutrition Intake  Outcome: Ongoing, Progressing     Problem: Renal Function Impairment (Acute Kidney Injury/Impairment)  Goal: Effective Renal Function  Outcome: Ongoing, Progressing     Problem: Fluid Imbalance (Pneumonia)  Goal: Fluid Balance  Outcome: Ongoing, Progressing     Problem: Infection (Pneumonia)  Goal: Resolution of Infection Signs " and Symptoms  Outcome: Ongoing, Progressing     Problem: Respiratory Compromise (Pneumonia)  Goal: Effective Oxygenation and Ventilation  Outcome: Ongoing, Progressing     Problem: Fall Injury Risk  Goal: Absence of Fall and Fall-Related Injury  Outcome: Ongoing, Progressing     Problem: Skin Injury Risk Increased  Goal: Skin Health and Integrity  Outcome: Ongoing, Progressing     Problem: Device-Related Complication Risk (CRRT (Continuous Renal Replacement Therapy))  Goal: Safe, Effective Therapy Delivery  Outcome: Ongoing, Progressing     Problem: Hypothermia (CRRT (Continuous Renal Replacement Therapy))  Goal: Body Temperature Maintained in Desired Range  Outcome: Ongoing, Progressing     Problem: Infection (CRRT (Continuous Renal Replacement Therapy))  Goal: Absence of Infection Signs and Symptoms  Outcome: Ongoing, Progressing     Problem: Device-Related Complication Risk (Hemodialysis)  Goal: Safe, Effective Therapy Delivery  Outcome: Ongoing, Progressing     Problem: Hemodynamic Instability (Hemodialysis)  Goal: Effective Tissue Perfusion  Outcome: Ongoing, Progressing     Problem: Infection (Hemodialysis)  Goal: Absence of Infection Signs and Symptoms  Outcome: Ongoing, Progressing     Problem: Impaired Wound Healing  Goal: Optimal Wound Healing  Outcome: Ongoing, Progressing
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
## 2022-11-30 NOTE — ASSESSMENT & PLAN NOTE
Due to microscopic polyangiitis. Remains on hemodialysis intermittently.   - Baseline creatinine 1.0-1.2.   - New onset proteinuria/hematuria.   - Renal biopsy completed and   - Trialysis in place for intermittent Hemodialysis    Plan  - Nephrology consulted, appreciate management  - management of MPA as noted separately  - Renal function panel daily  - renally dose all medications  - intermittent Hemodialysis as indicated, per Nephrology   11/17     Recent Labs   Lab 11/28/22  0356 11/29/22  0508 11/30/22  0407   * 98* 53*   CREATININE 10.5* 11.3* 6.8*     . Nephrology following for HD and  monitoring for renal recovery with some  improvement in UOP in last 2 days. will need PermCath and HD chair set up if no renal  recovery.  likely SNF pending nephro decision regarding HD   11/20  mL /24h. BUN/CR trending up to 113/9.8 , started on sodium bicarb for bicarb 19. continue lokelma 5g daily for 5.1   11/23 HD yesterday without ultrafiltration . UOP 500ml/24h   11/24: UOP 1100 cc in 24hr  11/25: So far today urinated 400cc by about 11am  11/26: Still monitoring for RRT needs  11/27: HD tomorrow, starting sodium bicarb, giving lokelma today  11/28: HD did not happen today, plan for tomorrow    See above for further details

## 2022-11-30 NOTE — ASSESSMENT & PLAN NOTE
BP Readings from Last 1 Encounters:   11/30/22 (!) 117/59     - Patient is unable to tolerate PO mediations, all meds to be administered via NG tube  -Will continue to monitor blood pressure trend closely and adjust antihypertensive regimen as clinically indicated and tolerated.    amLODIPine tablet 10 mg, 10 mg, Per NG tube, Daily    carvediloL tablet 12.5 mg, 12.5 mg, Per NG tube, BID    hydrALAZINE tablet 25 mg, 25 mg, Per NG tube, Q8H    lisinopriL tablet 40 mg, 40 mg, Per NG tube, Daily    11/24: NG tube has been removed and now taking medications by mouth  11/26: /56, continue current management  11/27: No changes

## 2022-12-01 PROBLEM — D63.1 ANEMIA DUE TO CHRONIC KIDNEY DISEASE: Status: ACTIVE | Noted: 2022-12-01

## 2022-12-01 PROBLEM — N18.9 ANEMIA DUE TO CHRONIC KIDNEY DISEASE: Status: ACTIVE | Noted: 2022-12-01

## 2022-12-01 LAB
ANION GAP SERPL CALC-SCNC: 14 MMOL/L (ref 8–16)
BASOPHILS # BLD AUTO: 0.02 K/UL (ref 0–0.2)
BASOPHILS NFR BLD: 0.2 % (ref 0–1.9)
BUN SERPL-MCNC: 57 MG/DL (ref 8–23)
CALCIUM SERPL-MCNC: 7.8 MG/DL (ref 8.7–10.5)
CHLORIDE SERPL-SCNC: 99 MMOL/L (ref 95–110)
CO2 SERPL-SCNC: 25 MMOL/L (ref 23–29)
CREAT SERPL-MCNC: 7.4 MG/DL (ref 0.5–1.4)
DIFFERENTIAL METHOD: ABNORMAL
EOSINOPHIL # BLD AUTO: 0 K/UL (ref 0–0.5)
EOSINOPHIL NFR BLD: 0.1 % (ref 0–8)
ERYTHROCYTE [DISTWIDTH] IN BLOOD BY AUTOMATED COUNT: 15.3 % (ref 11.5–14.5)
EST. GFR  (NO RACE VARIABLE): 5.3 ML/MIN/1.73 M^2
GLUCOSE SERPL-MCNC: 67 MG/DL (ref 70–110)
HCT VFR BLD AUTO: 26.5 % (ref 37–48.5)
HGB BLD-MCNC: 8.5 G/DL (ref 12–16)
IMM GRANULOCYTES # BLD AUTO: 0.17 K/UL (ref 0–0.04)
IMM GRANULOCYTES NFR BLD AUTO: 1.5 % (ref 0–0.5)
LYMPHOCYTES # BLD AUTO: 1.8 K/UL (ref 1–4.8)
LYMPHOCYTES NFR BLD: 15.1 % (ref 18–48)
MAGNESIUM SERPL-MCNC: 2 MG/DL (ref 1.6–2.6)
MCH RBC QN AUTO: 28.8 PG (ref 27–31)
MCHC RBC AUTO-ENTMCNC: 32.1 G/DL (ref 32–36)
MCV RBC AUTO: 90 FL (ref 82–98)
MONOCYTES # BLD AUTO: 0.8 K/UL (ref 0.3–1)
MONOCYTES NFR BLD: 7.2 % (ref 4–15)
NEUTROPHILS # BLD AUTO: 8.8 K/UL (ref 1.8–7.7)
NEUTROPHILS NFR BLD: 75.9 % (ref 38–73)
NRBC BLD-RTO: 0 /100 WBC
PHOSPHATE SERPL-MCNC: 5.4 MG/DL (ref 2.7–4.5)
PLATELET # BLD AUTO: 121 K/UL (ref 150–450)
PMV BLD AUTO: 11.8 FL (ref 9.2–12.9)
POCT GLUCOSE: 128 MG/DL (ref 70–110)
POCT GLUCOSE: 224 MG/DL (ref 70–110)
POCT GLUCOSE: 77 MG/DL (ref 70–110)
POCT GLUCOSE: 99 MG/DL (ref 70–110)
POTASSIUM SERPL-SCNC: 3.5 MMOL/L (ref 3.5–5.1)
RBC # BLD AUTO: 2.95 M/UL (ref 4–5.4)
SODIUM SERPL-SCNC: 138 MMOL/L (ref 136–145)
WBC # BLD AUTO: 11.59 K/UL (ref 3.9–12.7)

## 2022-12-01 PROCEDURE — 25000003 PHARM REV CODE 250

## 2022-12-01 PROCEDURE — 97110 THERAPEUTIC EXERCISES: CPT

## 2022-12-01 PROCEDURE — 85025 COMPLETE CBC W/AUTO DIFF WBC: CPT | Performed by: STUDENT IN AN ORGANIZED HEALTH CARE EDUCATION/TRAINING PROGRAM

## 2022-12-01 PROCEDURE — 80048 BASIC METABOLIC PNL TOTAL CA: CPT | Performed by: STUDENT IN AN ORGANIZED HEALTH CARE EDUCATION/TRAINING PROGRAM

## 2022-12-01 PROCEDURE — 84100 ASSAY OF PHOSPHORUS: CPT

## 2022-12-01 PROCEDURE — 36415 COLL VENOUS BLD VENIPUNCTURE: CPT

## 2022-12-01 PROCEDURE — 25000003 PHARM REV CODE 250: Performed by: STUDENT IN AN ORGANIZED HEALTH CARE EDUCATION/TRAINING PROGRAM

## 2022-12-01 PROCEDURE — 20600001 HC STEP DOWN PRIVATE ROOM

## 2022-12-01 PROCEDURE — 63600175 PHARM REV CODE 636 W HCPCS: Performed by: STUDENT IN AN ORGANIZED HEALTH CARE EDUCATION/TRAINING PROGRAM

## 2022-12-01 PROCEDURE — 99233 SBSQ HOSP IP/OBS HIGH 50: CPT | Mod: ,,, | Performed by: STUDENT IN AN ORGANIZED HEALTH CARE EDUCATION/TRAINING PROGRAM

## 2022-12-01 PROCEDURE — 97535 SELF CARE MNGMENT TRAINING: CPT

## 2022-12-01 PROCEDURE — 25000003 PHARM REV CODE 250: Performed by: HOSPITALIST

## 2022-12-01 PROCEDURE — 99233 PR SUBSEQUENT HOSPITAL CARE,LEVL III: ICD-10-PCS | Mod: ,,, | Performed by: STUDENT IN AN ORGANIZED HEALTH CARE EDUCATION/TRAINING PROGRAM

## 2022-12-01 PROCEDURE — 83735 ASSAY OF MAGNESIUM: CPT

## 2022-12-01 RX ADMIN — AMLODIPINE BESYLATE 10 MG: 10 TABLET ORAL at 08:12

## 2022-12-01 RX ADMIN — PANTOPRAZOLE SODIUM 40 MG: 40 TABLET, DELAYED RELEASE ORAL at 05:12

## 2022-12-01 RX ADMIN — HYDRALAZINE HYDROCHLORIDE 100 MG: 50 TABLET ORAL at 05:12

## 2022-12-01 RX ADMIN — SODIUM BICARBONATE 650 MG TABLET 650 MG: at 08:12

## 2022-12-01 RX ADMIN — PREDNISONE 20 MG: 20 TABLET ORAL at 08:12

## 2022-12-01 RX ADMIN — QUETIAPINE FUMARATE 25 MG: 25 TABLET ORAL at 08:12

## 2022-12-01 RX ADMIN — LEVOTHYROXINE SODIUM 25 MCG: 25 TABLET ORAL at 05:12

## 2022-12-01 RX ADMIN — ATOVAQUONE 1500 MG: 750 SUSPENSION ORAL at 08:12

## 2022-12-01 RX ADMIN — POTASSIUM BICARBONATE 50 MEQ: 978 TABLET, EFFERVESCENT ORAL at 11:12

## 2022-12-01 RX ADMIN — CARVEDILOL 25 MG: 25 TABLET, FILM COATED ORAL at 08:12

## 2022-12-01 RX ADMIN — HYDRALAZINE HYDROCHLORIDE 100 MG: 50 TABLET ORAL at 10:12

## 2022-12-01 RX ADMIN — HYDRALAZINE HYDROCHLORIDE 100 MG: 50 TABLET ORAL at 02:12

## 2022-12-01 NOTE — PLAN OF CARE
Problem: Occupational Therapy  Goal: Occupational Therapy Goal  Description: Goals to be met by: 12/15     Patient will increase functional independence with ADLs by performing:    UE Dressing with Modified Mower.  LE Dressing with Modified Mower.  Grooming while standing with Modified Mower.  Toileting from toilet with Modified Mower for hygiene and clothing management.   Supine to sit with Modified Mower.  Step transfer with Modified Mower  Toilet transfer to toilet with Modified Mower.  Pt will demonstrate independence with theraputty HEP.    Outcome: Ongoing, Progressing     Goals remain appropriate

## 2022-12-01 NOTE — PLAN OF CARE
Patient is alert and oriented to person and place but not time. Pt slept throughout night and denies any pain at this time. Safety measures remain in place, bed in lowest position and call light within reach.    Problem: Adult Inpatient Plan of Care  Goal: Plan of Care Review  Outcome: Ongoing, Progressing  Goal: Patient-Specific Goal (Individualized)  Outcome: Ongoing, Progressing  Goal: Absence of Hospital-Acquired Illness or Injury  Outcome: Ongoing, Progressing  Goal: Optimal Comfort and Wellbeing  Outcome: Ongoing, Progressing  Goal: Readiness for Transition of Care  Outcome: Ongoing, Progressing     Problem: Infection  Goal: Absence of Infection Signs and Symptoms  Outcome: Ongoing, Progressing     Problem: Adjustment to Illness (Sepsis/Septic Shock)  Goal: Optimal Coping  Outcome: Ongoing, Progressing     Problem: Bleeding (Sepsis/Septic Shock)  Goal: Absence of Bleeding  Outcome: Ongoing, Progressing     Problem: Glycemic Control Impaired (Sepsis/Septic Shock)  Goal: Blood Glucose Level Within Desired Range  Outcome: Ongoing, Progressing     Problem: Infection Progression (Sepsis/Septic Shock)  Goal: Absence of Infection Signs and Symptoms  Outcome: Ongoing, Progressing     Problem: Nutrition Impaired (Sepsis/Septic Shock)  Goal: Optimal Nutrition Intake  Outcome: Ongoing, Progressing     Problem: Fluid and Electrolyte Imbalance (Acute Kidney Injury/Impairment)  Goal: Fluid and Electrolyte Balance  Outcome: Ongoing, Progressing     Problem: Oral Intake Inadequate (Acute Kidney Injury/Impairment)  Goal: Optimal Nutrition Intake  Outcome: Ongoing, Progressing     Problem: Renal Function Impairment (Acute Kidney Injury/Impairment)  Goal: Effective Renal Function  Outcome: Ongoing, Progressing     Problem: Fluid Imbalance (Pneumonia)  Goal: Fluid Balance  Outcome: Ongoing, Progressing     Problem: Infection (Pneumonia)  Goal: Resolution of Infection Signs and Symptoms  Outcome: Ongoing, Progressing     Problem:  Respiratory Compromise (Pneumonia)  Goal: Effective Oxygenation and Ventilation  Outcome: Ongoing, Progressing     Problem: Fall Injury Risk  Goal: Absence of Fall and Fall-Related Injury  Outcome: Ongoing, Progressing     Problem: Skin Injury Risk Increased  Goal: Skin Health and Integrity  Outcome: Ongoing, Progressing     Problem: Device-Related Complication Risk (CRRT (Continuous Renal Replacement Therapy))  Goal: Safe, Effective Therapy Delivery  Outcome: Ongoing, Progressing     Problem: Hypothermia (CRRT (Continuous Renal Replacement Therapy))  Goal: Body Temperature Maintained in Desired Range  Outcome: Ongoing, Progressing     Problem: Infection (CRRT (Continuous Renal Replacement Therapy))  Goal: Absence of Infection Signs and Symptoms  Outcome: Ongoing, Progressing     Problem: Device-Related Complication Risk (Hemodialysis)  Goal: Safe, Effective Therapy Delivery  Outcome: Ongoing, Progressing     Problem: Hemodynamic Instability (Hemodialysis)  Goal: Effective Tissue Perfusion  Outcome: Ongoing, Progressing     Problem: Infection (Hemodialysis)  Goal: Absence of Infection Signs and Symptoms  Outcome: Ongoing, Progressing     Problem: Impaired Wound Healing  Goal: Optimal Wound Healing  Outcome: Ongoing, Progressing

## 2022-12-01 NOTE — SUBJECTIVE & OBJECTIVE
Interval History: Patient evaluated at bedside. No acute events overnight. Refers feeling well, no complaints this AM.     Review of patient's allergies indicates:   Allergen Reactions    Ampicillin     Peaches [peach (prunus persica)] Other (See Comments)     Pt unable to state type of reaction. Information obtained from daughter who states she was informed of allergy from patient.    Penicillins      Other reaction(s): Hives    Sulfa (sulfonamide antibiotics) Rash and Hives     Current Facility-Administered Medications   Medication Frequency    0.9%  NaCl infusion (for blood administration) Q24H PRN    0.9%  NaCl infusion (for blood administration) Q24H PRN    0.9%  NaCl infusion (for blood administration) Q24H PRN    0.9%  NaCl infusion Once    acetaminophen tablet 650 mg Q4H PRN    albuterol-ipratropium 2.5 mg-0.5 mg/3 mL nebulizer solution 3 mL Q4H PRN    aluminum-magnesium hydroxide-simethicone 200-200-20 mg/5 mL suspension 30 mL QID PRN    amLODIPine tablet 10 mg Daily    atovaquone 750 mg/5 mL oral liquid 1,500 mg Daily    bisacodyL suppository 10 mg Daily PRN    carvediloL tablet 25 mg BID    cloNIDine tablet 0.1 mg Q6H PRN    dextrose 10% bolus 125 mL PRN    dextrose 10% bolus 250 mL PRN    epoetin hira injection 2,880 Units Every Mon, Wed, Fri    glucagon (human recombinant) injection 1 mg PRN    glucose chewable tablet 16 g PRN    glucose chewable tablet 24 g PRN    heparin (porcine) injection 1,000 Units PRN    heparin (porcine) injection 1,000 Units PRN    heparin (porcine) injection 1,000 Units PRN    heparin, porcine (PF) 100 unit/mL injection flush 500 Units PRN    heparin, porcine (PF) 100 unit/mL injection flush 500 Units PRN    hydrALAZINE tablet 100 mg Q8H    insulin aspart U-100 pen 1-10 Units Q6H PRN    levothyroxine tablet 25 mcg Before breakfast    melatonin tablet 6 mg Nightly PRN    methocarbamoL tablet 500 mg TID PRN    metoclopramide HCl injection 5 mg Q6H PRN    naloxone 0.4 mg/mL  injection 0.02 mg PRN    ondansetron injection 4 mg Q8H PRN    pantoprazole EC tablet 40 mg BID AC    predniSONE tablet 20 mg Daily    Followed by    [START ON 12/4/2022] predniSONE tablet 12.5 mg Daily    Followed by    [START ON 12/18/2022] predniSONE tablet 10 mg Daily    Followed by    [START ON 1/1/2023] predniSONE tablet 5 mg Daily    prochlorperazine injection Soln 5 mg Q6H PRN    QUEtiapine tablet 25 mg QHS    simethicone chewable tablet 80 mg QID PRN    sodium bicarbonate tablet 650 mg BID    sodium chloride 0.9% 250 mL flush bag 1 time in Clinic/HOD    sodium chloride 0.9% bolus 250 mL PRN    sodium chloride 0.9% bolus 250 mL PRN    sodium chloride 0.9% flush 10 mL PRN    sodium chloride 0.9% flush 10 mL PRN    sodium chloride 0.9% flush 10 mL PRN    sodium chloride 0.9% flush 10 mL PRN    sodium chloride 0.9% flush 5 mL PRN    sucralfate tablet 1 g TID PRN     Facility-Administered Medications Ordered in Other Encounters   Medication Frequency    celecoxib capsule 400 mg Once    fentaNYL 50 mcg/mL injection  mcg PRN    LIDOcaine (PF) 10 mg/ml (1%) injection 10 mg Once PRN    LIDOcaine (PF) 10 mg/ml (1%) injection 10 mg Once    midazolam (VERSED) 1 mg/mL injection 0.5-4 mg PRN    ropivacaine 0.2% [facility] PainPRO Pump infusion 500 ML Continuous       Objective:     Vital Signs (Most Recent):  Temp: 98.5 °F (36.9 °C) (12/01/22 1127)  Pulse: 61 (12/01/22 1127)  Resp: 18 (12/01/22 1127)  BP: (!) 114/53 (12/01/22 1127)  SpO2: 95 % (12/01/22 1127)  O2 Device (Oxygen Therapy): room air (11/29/22 2326)   Vital Signs (24h Range):  Temp:  [98 °F (36.7 °C)-98.5 °F (36.9 °C)] 98.5 °F (36.9 °C)  Pulse:  [61-72] 61  Resp:  [10-18] 18  SpO2:  [92 %-96 %] 95 %  BP: (114-145)/(53-62) 114/53     Weight: 57.6 kg (126 lb 15.8 oz) (11/29/22 1000)  Body mass index is 23.99 kg/m².  Body surface area is 1.57 meters squared.    No intake/output data recorded.    Physical Exam  Vitals and nursing note reviewed.    Constitutional:       General: She is awake.      Appearance: She is well-developed. She is ill-appearing. She is not toxic-appearing or diaphoretic.   HENT:      Head: Normocephalic and atraumatic.      Right Ear: External ear normal.      Left Ear: External ear normal.      Nose:      Comments: + NGT     Mouth/Throat:      Mouth: Mucous membranes are dry.   Eyes:      General: Lids are normal. No scleral icterus.        Right eye: No discharge.         Left eye: No discharge.   Cardiovascular:      Rate and Rhythm: Normal rate and regular rhythm.   Pulmonary:      Effort: Pulmonary effort is normal.      Breath sounds: Normal breath sounds. No wheezing or rhonchi.   Abdominal:      General: Bowel sounds are normal.      Palpations: Abdomen is soft.      Tenderness: There is no abdominal tenderness.   Musculoskeletal:         General: No deformity.      Cervical back: Normal range of motion and neck supple. No rigidity or tenderness.      Right lower leg: No edema.      Left lower leg: No edema.   Skin:     General: Skin is warm and dry.   Neurological:      General: No focal deficit present.      Mental Status: She is alert and oriented to person, place, and time. Mental status is at baseline.   Psychiatric:         Attention and Perception: Attention normal.         Behavior: Behavior normal.       Significant Labs:  CBC:   Recent Labs   Lab 12/01/22  0344   WBC 11.59   RBC 2.95*   HGB 8.5*   HCT 26.5*   *   MCV 90   MCH 28.8   MCHC 32.1       CMP:   Recent Labs   Lab 11/26/22  1408 11/27/22  0720 12/01/22  0344   *   < > 67*   CALCIUM 7.4*   < > 7.8*   ALBUMIN 2.5*  --   --    *   < > 138   K 4.1   < > 3.5   CO2 22*   < > 25   CL 98   < > 99   BUN 94*   < > 57*   CREATININE 9.8*   < > 7.4*    < > = values in this interval not displayed.       All labs within the past 24 hours have been reviewed.

## 2022-12-01 NOTE — PROGRESS NOTES
[facility] - [facility] ([place] [id number])  Nephrology  Progress Note    Patient Name: Kristin Goodman  MRN: [id number]  Admission Date: 10/17/2022  Hospital Length of Stay: 45 days  Attending Provider: Merlene Mcdaniel MD   Primary Care Physician: Lori Hernandez MD  Principal Problem:Microscopic polyangiitis    Subjective:     HPI: Kristin Goodman is a 77 year old female with hypertension, hypothyroidism, HFpEF who presents for cough, shortness of breath, and weakness.  Patient initially presented to ER on 09/24 with hemoptysis and imaging concerning for multilobar pneumonia at which time she was discharged on a 10 day course of levofloxacin.  Patient initially had some improvement but began having worsening symptoms on 10/14.  Patient describes multiple fevers and progressive shortness of breath.  She continues to have intermittent hemoptysis.  Patient with worsening leukocytosis and limited clinical improvement despite broad-spectrum antibiotics.  She remains on cefepime.  Patient is a noted to have baseline creatinine of 1 as recent as 8/2022 but progressive worsening of creatinine in early October.  On admission patient with creatinine of 1.6 (10/17) with subsequent progression and creatinine of 3.0 at time of consultation (10/23).  Nephrology consulted for acute kidney injury.      Interval History: Patient evaluated at bedside. No acute events overnight. Refers feeling well, no complaints this AM.     Review of patient's allergies indicates:   Allergen Reactions    Ampicillin     Peaches [peach (prunus persica)] Other (See Comments)     Pt unable to state type of reaction. Information obtained from daughter who states she was informed of allergy from patient.    Penicillins      Other reaction(s): Hives    Sulfa (sulfonamide antibiotics) Rash and Hives     Current Facility-Administered Medications   Medication Frequency    0.9%  NaCl infusion (for blood administration) Q24H PRN    0.9%  NaCl infusion (for  blood administration) Q24H PRN    0.9%  NaCl infusion (for blood administration) Q24H PRN    0.9%  NaCl infusion Once    acetaminophen tablet 650 mg Q4H PRN    albuterol-ipratropium 2.5 mg-0.5 mg/3 mL nebulizer solution 3 mL Q4H PRN    aluminum-magnesium hydroxide-simethicone 200-200-20 mg/5 mL suspension 30 mL QID PRN    amLODIPine tablet 10 mg Daily    atovaquone 750 mg/5 mL oral liquid 1,500 mg Daily    bisacodyL suppository 10 mg Daily PRN    carvediloL tablet 25 mg BID    cloNIDine tablet 0.1 mg Q6H PRN    dextrose 10% bolus 125 mL PRN    dextrose 10% bolus 250 mL PRN    epoetin hira injection 2,880 Units Every Mon, Wed, Fri    glucagon (human recombinant) injection 1 mg PRN    glucose chewable tablet 16 g PRN    glucose chewable tablet 24 g PRN    heparin (porcine) injection 1,000 Units PRN    heparin (porcine) injection 1,000 Units PRN    heparin (porcine) injection 1,000 Units PRN    heparin, porcine (PF) 100 unit/mL injection flush 500 Units PRN    heparin, porcine (PF) 100 unit/mL injection flush 500 Units PRN    hydrALAZINE tablet 100 mg Q8H    insulin aspart U-100 pen 1-10 Units Q6H PRN    levothyroxine tablet 25 mcg Before breakfast    melatonin tablet 6 mg Nightly PRN    methocarbamoL tablet 500 mg TID PRN    metoclopramide HCl injection 5 mg Q6H PRN    naloxone 0.4 mg/mL injection 0.02 mg PRN    ondansetron injection 4 mg Q8H PRN    pantoprazole EC tablet 40 mg BID AC    predniSONE tablet 20 mg Daily    Followed by    [START ON 12/4/2022] predniSONE tablet 12.5 mg Daily    Followed by    [START ON 12/18/2022] predniSONE tablet 10 mg Daily    Followed by    [START ON 1/1/2023] predniSONE tablet 5 mg Daily    prochlorperazine injection Soln 5 mg Q6H PRN    QUEtiapine tablet 25 mg QHS    simethicone chewable tablet 80 mg QID PRN    sodium bicarbonate tablet 650 mg BID    sodium chloride 0.9% 250 mL flush bag 1 time in Clinic[facility]    sodium chloride 0.9% bolus 250 mL  PRN    sodium chloride 0.9% bolus 250 mL PRN    sodium chloride 0.9% flush 10 mL PRN    sodium chloride 0.9% flush 10 mL PRN    sodium chloride 0.9% flush 10 mL PRN    sodium chloride 0.9% flush 10 mL PRN    sodium chloride 0.9% flush 5 mL PRN    sucralfate tablet 1 g TID PRN     Facility-Administered Medications Ordered in Other Encounters   Medication Frequency    celecoxib capsule 400 mg Once    fentaNYL 50 mcg/mL injection  mcg PRN    LIDOcaine (PF) 10 mg/ml (1%) injection 10 mg Once PRN    LIDOcaine (PF) 10 mg/ml (1%) injection 10 mg Once    midazolam (VERSED) 1 mg/mL injection 0.5-4 mg PRN    ropivacaine 0.2% [facility] PainPRO Pump infusion 500 ML Continuous       Objective:     Vital Signs (Most Recent):  Temp: 98.5 °F (36.9 °C) (12/01/22 1127)  Pulse: 61 (12/01/22 1127)  Resp: 18 (12/01/22 1127)  BP: (!) 114/53 (12/01/22 1127)  SpO2: 95 % (12/01/22 1127)  O2 Device (Oxygen Therapy): room air (11/29/22 2326)   Vital Signs (24h Range):  Temp:  [98 °F (36.7 °C)-98.5 °F (36.9 °C)] 98.5 °F (36.9 °C)  Pulse:  [61-72] 61  Resp:  [10-18] 18  SpO2:  [92 %-96 %] 95 %  BP: (114-145)/(53-62) 114/53     Weight: 57.6 kg (126 lb 15.8 oz) (11/29/22 1000)  Body mass index is 23.99 kg/m².  Body surface area is 1.57 meters squared.    No intake/output data recorded.    Physical Exam  Vitals and nursing note reviewed.   Constitutional:       General: She is awake.      Appearance: She is well-developed. She is ill-appearing. She is not toxic-appearing or diaphoretic.   HENT:      Head: Normocephalic and atraumatic.      Right Ear: External ear normal.      Left Ear: External ear normal.      Nose:      Comments: + NGT     Mouth/Throat:      Mouth: Mucous membranes are dry.   Eyes:      General: Lids are normal. No scleral icterus.        Right eye: No discharge.         Left eye: No discharge.   Cardiovascular:      Rate and Rhythm: Normal rate and regular rhythm.   Pulmonary:      Effort: Pulmonary effort is  "normal.      Breath sounds: Normal breath sounds. No wheezing or rhonchi.   Abdominal:      General: Bowel sounds are normal.      Palpations: Abdomen is soft.      Tenderness: There is no abdominal tenderness.   Musculoskeletal:         General: No deformity.      Cervical back: Normal range of motion and neck supple. No rigidity or tenderness.      Right lower leg: No edema.      Left lower leg: No edema.   Skin:     General: Skin is warm and dry.   Neurological:      General: No focal deficit present.      Mental Status: She is alert and oriented to person, place, and time. Mental status is at baseline.   Psychiatric:         Attention and Perception: Attention normal.         Behavior: Behavior normal.       Significant Labs:  CBC:   Recent Labs   Lab 12/01/22  0344   WBC 11.59   RBC 2.95*   HGB 8.5*   HCT 26.5*   *   MCV 90   MCH 28.8   MCHC 32.1       CMP:   Recent Labs   Lab 11/26/22  1408 11/27/22  0720 12/01/22  0344   *   < > 67*   CALCIUM 7.4*   < > 7.8*   ALBUMIN 2.5*  --   --    *   < > 138   K 4.1   < > 3.5   CO2 22*   < > 25   CL 98   < > 99   BUN 94*   < > 57*   CREATININE 9.8*   < > 7.4*    < > = values in this interval not displayed.       All labs within the past 24 hours have been reviewed.       Assessment/Plan:     * Microscopic polyangiitis  See "Primary pauci-immune necrotizing and crescentic glomerulonephritis"      Anemia due to chronic kidney disease  EPO TIW    Primary pauci-immune necrotizing and crescentic glomerulonephritis  Kidney biopsy (10/26): pauci immune necrotizing and crescentic glomerulonephritis   s/p IV Solumedrol 1000 mg x3 doses.  PLEX 10/26, 10/27, 10/29, 10/30, 11/1, 11/2, 11/3 (prior to Rituxan)  Rituximab 375 mg/m^2 (600 mg) on 10/27 11/3, 11/10 and 11/17 (completed 4 doses)  Cyclophosphamide 7.5 mg/kg on 10/27 and 11/10 ( every 14 days ); has completed 2 doses   Serum BUN/Cr slowly trending up; will continue monitoring daily for dialysis needs  Now " following PEXIVAS steroid taper; currently on Prednisone 20mg PO daily   Sodium bicarb 650mg PO BID   Continue Atovaquone for prophylaxis   Strict I/Os and chart   Avoid nephrotoxic agents as much as possible  No emergent need for hemodialysis at this time; will continue evaluation on a daily basis            James Gomez MD  Nephrology  [facility] - Intensive Care ([place]-[facility])

## 2022-12-01 NOTE — PT/OT/SLP PROGRESS
Occupational Therapy   Treatment    Name: Kristin Goodman  MRN: [id number]  Admitting Diagnosis:  Microscopic polyangiitis  17 Days Post-Op    Recommendations:     Discharge Recommendations: nursing facility, skilled  Discharge Equipment Recommendations:  bedside commode, walker, rolling  Barriers to discharge:  None    Assessment:     Kristin Goodman is a 77 y.o. female with a medical diagnosis of Microscopic polyangiitis.  She presents with good participation and motivation. Pt continues to require (A) with all activities & is at risk for falls. Goals remain appropriate. Performance deficits affecting function are weakness, impaired endurance, impaired self care skills, impaired functional mobility, impaired balance, decreased upper extremity function, decreased lower extremity function, decreased safety awareness.     Rehab Prognosis:  Good; patient would benefit from acute skilled OT services to address these deficits and reach maximum level of function.       Plan:     Patient to be seen 3 x/week to address the above listed problems via self-care/home management, therapeutic activities, therapeutic exercises, neuromuscular re-education  Plan of Care Expires: 12/29/22  Plan of Care Reviewed with: patient    Subjective   Pt reported that she really wants to get out of the hospital soon.  Pain/Comfort:  Pain Rating 1: 0/10  Pain Rating Post-Intervention 1: 0/10    Objective:     Communicated with: RN prior to session.  Patient found up in chair with telemetry (no family present) upon OT entry to room.    General Precautions: Standard, fall, aspiration   Orthopedic Precautions:N/A   Braces: N/A  Respiratory Status: Room air     Occupational Performance:       Functional Mobility/Transfers:  Patient completed Sit <> Stand Transfer with moderate assistance  with  rolling walker from chair  Functional Mobility: CGA with functional mobility around room using RW for endurance & balance training    Activities of Daily  Living:  Lower Body Dressing: stand by assistance donning socks seated in chair      [facility] 6 Click ADL: 18    Treatment & Education:  Pt performed AROM exercises with BUE in 3 planes x 10 reps each while seated in chair. Provided verbal & demonstrated instruction on hand placement during transfers using RW. Provided education on safety & need for (A) with OOB activities.  Pt had no further questions & when asked whether there were any concerns pt reported none.      Patient left up in chair with all lines intact, call button in reach, and RN notified    GOALS:   Multidisciplinary Problems       Occupational Therapy Goals          Problem: Occupational Therapy    Goal Priority Disciplines Outcome Interventions   Occupational Therapy Goal     OT, PT/OT Ongoing, Progressing    Description: Goals to be met by: 12/15     Patient will increase functional independence with ADLs by performing:    UE Dressing with Modified [place].  LE Dressing with Modified [place].  Grooming while standing with Modified [place].  Toileting from toilet with Modified [place] for hygiene and clothing management.   Supine to sit with Modified [place].  Step transfer with Modified [place]  Toilet transfer to toilet with Modified [place].  Pt will demonstrate independence with theraputty HEP.                         Time Tracking:     OT Date of Treatment: 12/01/22  OT Start Time: 1051  OT Stop Time: 1114  OT Total Time (min): 23 min    Billable Minutes:Self Care/Home Management 13  Therapeutic Exercise 10    OT/SARAHI: OT     SARAHI Visit Number: 0    12/1/2022   04-Jun-2022 16:36

## 2022-12-02 LAB
ANION GAP SERPL CALC-SCNC: 15 MMOL/L (ref 8–16)
BASOPHILS # BLD AUTO: 0.02 K/UL (ref 0–0.2)
BASOPHILS NFR BLD: 0.2 % (ref 0–1.9)
BUN SERPL-MCNC: 62 MG/DL (ref 8–23)
CALCIUM SERPL-MCNC: 7.7 MG/DL (ref 8.7–10.5)
CHLORIDE SERPL-SCNC: 98 MMOL/L (ref 95–110)
CO2 SERPL-SCNC: 24 MMOL/L (ref 23–29)
CREAT SERPL-MCNC: 8.1 MG/DL (ref 0.5–1.4)
DIFFERENTIAL METHOD: ABNORMAL
EOSINOPHIL # BLD AUTO: 0 K/UL (ref 0–0.5)
EOSINOPHIL NFR BLD: 0 % (ref 0–8)
ERYTHROCYTE [DISTWIDTH] IN BLOOD BY AUTOMATED COUNT: 15.2 % (ref 11.5–14.5)
EST. GFR  (NO RACE VARIABLE): 4.8 ML/MIN/1.73 M^2
GLUCOSE SERPL-MCNC: 71 MG/DL (ref 70–110)
HCT VFR BLD AUTO: 27.8 % (ref 37–48.5)
HGB BLD-MCNC: 8.7 G/DL (ref 12–16)
IMM GRANULOCYTES # BLD AUTO: 0.17 K/UL (ref 0–0.04)
IMM GRANULOCYTES NFR BLD AUTO: 1.4 % (ref 0–0.5)
LYMPHOCYTES # BLD AUTO: 1.9 K/UL (ref 1–4.8)
LYMPHOCYTES NFR BLD: 15.6 % (ref 18–48)
MAGNESIUM SERPL-MCNC: 1.9 MG/DL (ref 1.6–2.6)
MCH RBC QN AUTO: 28.4 PG (ref 27–31)
MCHC RBC AUTO-ENTMCNC: 31.3 G/DL (ref 32–36)
MCV RBC AUTO: 91 FL (ref 82–98)
MONOCYTES # BLD AUTO: 0.9 K/UL (ref 0.3–1)
MONOCYTES NFR BLD: 7.3 % (ref 4–15)
NEUTROPHILS # BLD AUTO: 9.3 K/UL (ref 1.8–7.7)
NEUTROPHILS NFR BLD: 75.5 % (ref 38–73)
NRBC BLD-RTO: 0 /100 WBC
PHOSPHATE SERPL-MCNC: 5 MG/DL (ref 2.7–4.5)
PLATELET # BLD AUTO: 127 K/UL (ref 150–450)
PMV BLD AUTO: 12 FL (ref 9.2–12.9)
POCT GLUCOSE: 123 MG/DL (ref 70–110)
POCT GLUCOSE: 83 MG/DL (ref 70–110)
POTASSIUM SERPL-SCNC: 4.3 MMOL/L (ref 3.5–5.1)
RBC # BLD AUTO: 3.06 M/UL (ref 4–5.4)
SODIUM SERPL-SCNC: 137 MMOL/L (ref 136–145)
WBC # BLD AUTO: 12.24 K/UL (ref 3.9–12.7)

## 2022-12-02 PROCEDURE — 97530 THERAPEUTIC ACTIVITIES: CPT

## 2022-12-02 PROCEDURE — 94761 N-INVAS EAR/PLS OXIMETRY MLT: CPT

## 2022-12-02 PROCEDURE — 83735 ASSAY OF MAGNESIUM: CPT | Performed by: STUDENT IN AN ORGANIZED HEALTH CARE EDUCATION/TRAINING PROGRAM

## 2022-12-02 PROCEDURE — 99232 SBSQ HOSP IP/OBS MODERATE 35: CPT | Mod: ,,, | Performed by: STUDENT IN AN ORGANIZED HEALTH CARE EDUCATION/TRAINING PROGRAM

## 2022-12-02 PROCEDURE — 99232 PR SUBSEQUENT HOSPITAL CARE,LEVL II: ICD-10-PCS | Mod: ,,, | Performed by: INTERNAL MEDICINE

## 2022-12-02 PROCEDURE — 80048 BASIC METABOLIC PNL TOTAL CA: CPT | Performed by: STUDENT IN AN ORGANIZED HEALTH CARE EDUCATION/TRAINING PROGRAM

## 2022-12-02 PROCEDURE — 25000003 PHARM REV CODE 250

## 2022-12-02 PROCEDURE — 20600001 HC STEP DOWN PRIVATE ROOM

## 2022-12-02 PROCEDURE — 99232 SBSQ HOSP IP/OBS MODERATE 35: CPT | Mod: ,,, | Performed by: INTERNAL MEDICINE

## 2022-12-02 PROCEDURE — 25000003 PHARM REV CODE 250: Performed by: HOSPITALIST

## 2022-12-02 PROCEDURE — 85025 COMPLETE CBC W/AUTO DIFF WBC: CPT | Performed by: STUDENT IN AN ORGANIZED HEALTH CARE EDUCATION/TRAINING PROGRAM

## 2022-12-02 PROCEDURE — 25000003 PHARM REV CODE 250: Performed by: STUDENT IN AN ORGANIZED HEALTH CARE EDUCATION/TRAINING PROGRAM

## 2022-12-02 PROCEDURE — 99232 PR SUBSEQUENT HOSPITAL CARE,LEVL II: ICD-10-PCS | Mod: ,,, | Performed by: STUDENT IN AN ORGANIZED HEALTH CARE EDUCATION/TRAINING PROGRAM

## 2022-12-02 PROCEDURE — 97110 THERAPEUTIC EXERCISES: CPT

## 2022-12-02 PROCEDURE — 63600175 PHARM REV CODE 636 W HCPCS: Performed by: STUDENT IN AN ORGANIZED HEALTH CARE EDUCATION/TRAINING PROGRAM

## 2022-12-02 PROCEDURE — 84100 ASSAY OF PHOSPHORUS: CPT | Performed by: STUDENT IN AN ORGANIZED HEALTH CARE EDUCATION/TRAINING PROGRAM

## 2022-12-02 RX ADMIN — SODIUM BICARBONATE 650 MG TABLET 650 MG: at 09:12

## 2022-12-02 RX ADMIN — AMLODIPINE BESYLATE 10 MG: 10 TABLET ORAL at 09:12

## 2022-12-02 RX ADMIN — CARVEDILOL 25 MG: 25 TABLET, FILM COATED ORAL at 09:12

## 2022-12-02 RX ADMIN — HYDRALAZINE HYDROCHLORIDE 100 MG: 50 TABLET ORAL at 06:12

## 2022-12-02 RX ADMIN — PANTOPRAZOLE SODIUM 40 MG: 40 TABLET, DELAYED RELEASE ORAL at 06:12

## 2022-12-02 RX ADMIN — HYDRALAZINE HYDROCHLORIDE 100 MG: 50 TABLET ORAL at 02:12

## 2022-12-02 RX ADMIN — ATOVAQUONE 1500 MG: 750 SUSPENSION ORAL at 09:12

## 2022-12-02 RX ADMIN — LEVOTHYROXINE SODIUM 25 MCG: 25 TABLET ORAL at 06:12

## 2022-12-02 RX ADMIN — QUETIAPINE FUMARATE 25 MG: 25 TABLET ORAL at 09:12

## 2022-12-02 RX ADMIN — PANTOPRAZOLE SODIUM 40 MG: 40 TABLET, DELAYED RELEASE ORAL at 05:12

## 2022-12-02 RX ADMIN — PREDNISONE 20 MG: 20 TABLET ORAL at 09:12

## 2022-12-02 RX ADMIN — HYDRALAZINE HYDROCHLORIDE 100 MG: 50 TABLET ORAL at 09:12

## 2022-12-02 NOTE — ASSESSMENT & PLAN NOTE
Due to microscopic polyangiitis. Remains on hemodialysis intermittently.   - Baseline creatinine 1.0-1.2.   - New onset proteinuria/hematuria.   - Renal biopsy completed and   - Trialysis in place for intermittent Hemodialysis    Plan  - Nephrology consulted, appreciate management  - management of MPA as noted separately  - Renal function panel daily  - renally dose all medications  - intermittent Hemodialysis as indicated, per Nephrology--> still evaluating daily for HD need and permacath placement- if needing permacath placement, then will need to be done prior to dc and outpatient HD chair arranged.      Recent Labs   Lab 11/29/22  0508 11/30/22  0407 12/01/22  0344   BUN 98* 53* 57*   CREATININE 11.3* 6.8* 7.4*

## 2022-12-02 NOTE — ASSESSMENT & PLAN NOTE
As of 10/26/22 strongly positive MPO Ab (in contrast to borderline positive PR3), consistent with clinical picture of microscopic polyangiitis with pulmonary, and renal involvement after presenting with acute hypoxemic respiratory failure, hemoptysis, and acute renal failure.  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   3)  CHANGES SUGGESTIVE OF FOCAL ACUTE PYELONEPHRITIS.   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 Now following PEXIVAS steroid taper. Currently on oral steroid taper   - plan for 20mg IV daily on 11/6 for 14 days (last dose of 20mg equivalent is 11/20/2022).   - apheresis: underwent PLEX 10/26, 10/27, 10/30, 11/1, 11/2, 11/3  - rituximab every 7 days for 4 doses: Dose #1: 10/27, #2 11/3, #3 11/10, and #4 given 11/17  - cyclophosphamide every 14 days for 2 doses: 10/27, 11/10  - Opportunistic Infection ppx: atovaquone 1500mg PO daily  - GI bleed prophylaxis: pantoprazole 40mg daily    Home

## 2022-12-02 NOTE — ASSESSMENT & PLAN NOTE
Patient with acute kidney injury likely due to microscopic polyangiitis WILFREDO is currently stable. Labs reviewed- Renal function/electrolytes with Estimated Creatinine Clearance: 5.4 mL/min (A) (based on SCr of 7.4 mg/dL (H)). according to latest data. Monitor urine output and serial BMP and adjust therapy as needed. Avoid nephrotoxins and renally dose meds for GFR listed above.

## 2022-12-02 NOTE — ASSESSMENT & PLAN NOTE
SLP following  MBSS showing global weakness in swallowing as well as aspiration with thin liquids.   ENT consulted but patient did not tolerate laryngoscopy     Plan   - Dysphagia possibly 2/2 previous stroke  - Briefly required NGT, worked SLP  - NGT removed- being treated with nystatin swish and swallow for thrush  - GI consulted as well for concern of oropharyngeal candidiasis--> Low suspicion for esophageal candidiasis without odynophagia however can have if white plaques have been seen on oropharynx, ok to start fluconazole empirically for possible esophageal candidiasis  - EGD on 11/14 without abnormality; per GI, Suspect some aspect of esophageal dysmotility in setting of critical illness. No further testing at this time.  - Advanced to renal diet 11/22

## 2022-12-02 NOTE — ASSESSMENT & PLAN NOTE
Due to microscopic polyangiitis. Remains on hemodialysis intermittently.   - Baseline creatinine 1.0-1.2.   - New onset proteinuria/hematuria.   - Renal biopsy completed and   - Trialysis in place for intermittent Hemodialysis    Plan  - Nephrology consulted, appreciate management  - management of MPA as noted separately  - Renal function panel daily  - renally dose all medications  - intermittent Hemodialysis as indicated, per Nephrology--> still evaluating daily for HD need and permacath placement- if needing permacath placement, then will need to be done prior to dc and outpatient HD chair arranged.      Recent Labs   Lab 11/30/22  0407 12/01/22  0344 12/02/22  0423   BUN 53* 57* 62*   CREATININE 6.8* 7.4* 8.1*

## 2022-12-02 NOTE — ASSESSMENT & PLAN NOTE
In the setting of GI bleed with melena  - Evaluated by GI, see GI bleed documentation  - Last transfusion 10/28, Hgb slowly trending down since then  - Hgb 6.9 on 11/5      Plan  - Monitor CBC Daily   - Treat with pRBC transfusion PRN Hgb <7  - Transfused 3u pRBC    Current CBC reviewed-    Recent Labs   Lab 11/30/22  0407 12/01/22  0344 12/02/22  0423   HGB 8.7* 8.5* 8.7*

## 2022-12-02 NOTE — ASSESSMENT & PLAN NOTE
Starting having hemoptysis and melena with associated acute blood loss anemia earlier in hospital stay. Patient with known internal hemorrhoids, mild sigmoid diverticulosis, history of gastritis and + H pylori (2012 EGD).   - GI consulted 10/19, unable to perform EGD due to instability and respiratory failure at the time    Plan  - PPI PO BID   - Monitor CBC closely for signs of recurrent bleed  - EGD w/no acute bleed seen  - Transfused 2u pRBC

## 2022-12-02 NOTE — ASSESSMENT & PLAN NOTE
BP Readings from Last 1 Encounters:   12/01/22 (!) 120/58     - Patient is unable to tolerate PO mediations, all meds to be administered via NG tube  - Will continue to monitor blood pressure trend closely and adjust antihypertensive regimen as clinically indicated and tolerated.    amLODIPine tablet 10 mg, 10 mg, Per PO, Daily    carvediloL tablet 12.5 mg, 12.5 mg, Per PO, BID    hydrALAZINE tablet 25 mg, 25 mg, Per PO, Q8H    lisinopriL tablet 40 mg, 40 mg, Per PO, Daily

## 2022-12-02 NOTE — ASSESSMENT & PLAN NOTE
BP Readings from Last 1 Encounters:   12/02/22 (!) 104/53     - Patient is unable to tolerate PO mediations, all meds to be administered via NG tube  - Will continue to monitor blood pressure trend closely and adjust antihypertensive regimen as clinically indicated and tolerated.    amLODIPine tablet 10 mg, 10 mg, Per PO, Daily    carvediloL tablet 12.5 mg, 12.5 mg, Per PO, BID    hydrALAZINE tablet 25 mg, 25 mg, Per PO, Q8H    lisinopriL tablet 40 mg, 40 mg, Per PO, Daily

## 2022-12-02 NOTE — ASSESSMENT & PLAN NOTE
Patient with acute kidney injury likely due to microscopic polyangiitis WILFREDO is currently stable. Labs reviewed- Renal function/electrolytes with Estimated Creatinine Clearance: 4.9 mL/min (A) (based on SCr of 8.1 mg/dL (H)). according to latest data. Monitor urine output and serial BMP and adjust therapy as needed. Avoid nephrotoxins and renally dose meds for GFR listed above.

## 2022-12-02 NOTE — ASSESSMENT & PLAN NOTE
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  PLEX 10/26, 10/27, 10/29, 10/30, 11/1, 11/2, 11/3 (prior to Rituxan)  Rituximab 375 mg/m^2 (600 mg) on 10/27 11/3, 11/10 and 11/17 (completed 4 doses)  Cyclophosphamide 7.5 mg/kg on 10/27 and 11/10 ( every 14 days ); has completed 2 doses; may need another dose of cyclophosphamide 6 weeks after her last dose (around December 22, 2022)  Serum BUN/Cr slowly trending up; will continue monitoring daily for dialysis needs  Now following PEXIVAS steroid taper; currently on Prednisone 20mg PO daily   Sodium bicarb 650mg PO BID   Continue Atovaquone for prophylaxis   Strict I/Os and chart   Avoid nephrotoxic agents as much as possible  No emergent need for hemodialysis at this time; will continue evaluation on a daily basis

## 2022-12-02 NOTE — PROGRESS NOTES
[facility] ([address] Medicine  Progress Note    Patient Name: Kristin Goodman  MRN: [id number]  Patient Class: IP- Inpatient   Admission Date: 10/17/2022  Length of Stay: 46 days  Attending Physician: Merlene Mcdaniel MD  Primary Care Provider: Lori Hernandez MD        Subjective:     Principal Problem:Microscopic polyangiitis        HPI:  Kristin Goodman is a 77 y.o. female with a past medical history of HTN, hypothyroidism, HFpEF, and osteoarthritis of the arm who has presented to the ED for cough, SOB, and weakness. Daughter is present at the bedside. Patient presented to the ED on 9/24 with hemoptysis, CT and CXR showed high suspicion for multilobar pneumonia. Patient was discharged with a 10-day course of levofloxacin and an albuterol inhaler. Patient followed up with her PCP on 10/4 with slight improvement in symptoms and went back to work. Patient continued to have worsening symptoms and presented to the ED again on 10/14 for evaluation and no interventions were done. Patient endorses fevers over the last few days up to 102.4 F with progressive SOB. She endorses hemoptysis with moderate amount of blood, generalized weakness, productive cough, SOB, and loss of appetite. Denies chest pain, nausea, vomiting, abdominal pain, or urinary changes.    ED: hypertensive up to 219/93 and tachycardic up to 117. Oxygen saturation on 92% on RA, placed on 5L NC with sats >97%. CBC remarkable for leukocytosis of 16.03 and Hb 7.7. K 3.3. Cr 1.6, baseline ~1.0. . Troponin 0.061. COVID and flu negative. EKG NSR. CT chest with contrast pending at time of admission. Given home amlodipine and lisinopril. Given 500mL NS, IV azithromycin, cefepime and vanc.       Overview/Hospital Course:  HM Course:  Ms. Goodman was admitted to Hospital Medicine for management of multifocal pneumonia and acute hypoxemic respiratory failure after presenting to the ED with fevers, cough, hemoptysis, and dyspnea.  She required escalation to the MICU due to abrupt decline in her respiratory status, associated with hemoptysis and requiring NIPPV. CT chest showed bilateral patchy ground glass opacities. Labs additionally were notable for acute renal failure on CKD3. Pulmonology was consulted while under the care of [facility] and felt this picture was consistent with diffuse alveolar hemorrhage.     ICU Course:   Her workup revealed a strongly positive MPO Ab consistent with clinical picture of microscopic polyangiitis with pulmonary and renal involvement. She underwent Trialysis catheter placement 10/25/2022 in the Medical ICU. 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     Rheumatology was consulted, extensive workup revealed MITUL + 1:2560 homogenous, +dsDNA, normal complements, PR3 1.2 (slight +),  (+), cANCA + 1:80, pANCA neg, GBMAb neg, trace cryos. Due to concern for MPA, she was started on pulse dose IV methylprednisolone 1000mg for 3 days, and plasmapheresis under the guidance of Transfusion Medicine. Patient was placed on steroid taper and completed PLEX. She additionally received rituximab and cyclophosphamide. OI prophylaxis was provided with atovaquone due to a sulfa allergy.     Nephrology was consulted for evaluation of acute renal failure in the setting of MPA. She did require SLED in the ICU for renal clearance and remains on intermittent hemodialysis. She is expected to continue HD once discharged.     Her acute hypoxemic respiratory failure improved and she was weaned off of supplemental oxygen. She stepped down to [facility] 10/28.     HM Course:  She underwent MBBS for evaluation of dysphagia, which showed global delayed initiation of swallow and  aspiration with thin liquids. Due to concern for possible prior stroke, head imaging was completed and negative for acute finding. She was NPO with NG tube. ENT was consulted for evaluation of dysphagia and cervical adenopathy.  Patient did not tolerate laryngoscopy; unable to visualize vocal cords but given her lack of hoarseness, they did not suspect vocal fold paresis/paralysis. MRI recommended by rheum to fully rule out any potential neurological cause of the patient's dysphagia; MRI demonstrated remote left thalamic lacunar type infarct and punctate remote left cerebellar infarcts.  She continued to work with speech therapy.  EGD completed 11/14 without abnormalities.  Per GI, Suspect some aspect of esophageal dysmotility in setting of critical illness. No further testing at this time.  Per SLP, diet advanced on 11/15 and NG tube removed.    Her other chronic medical conditions including hypothyroidism, diastolic CHF, and hypertension were managed with her home medications, with dose adjustments as needed.     Patient was noted to have downtrending Hg 11/17- required 1u pRBC. Rheum continued to follow for further steroid dosing. Patient was started on 2g NaCl tab TID for SIADH. SLP recommended mechanical soft diet with thin liquids 11/17. Na normalized 11/18. Hg improved to 8.5 following 1u pRBC. Patient had some hyperkalemia- started on scheduled lokelma. Patient continued to have improved UOP; however, BUN: Cr ratio uptrended. Nephro decided to hang off on HD as patient was having 500-600mL/24hr. He was started on scheduled NaHCO3 as ewll. Patient underwent HD 11/23. Patient had drop in Hg once again- 6.5 on 11/25. Given 1u pRBC (2nd unit). Underwent HD 11/30. Nephrology following to re-evaluate daily for need for HD and permacath placement.       Interval History: Patient was seen and evaluated this am. No complaints at this time. No nausea, vomiting, fevers, chills, night sweats, abd pain, diarrhea,  constipation, chest pain, cough, shortness of breath. Patient does report good UOP despite inadequate documentation.     Updated daughter (Angeles) and sister (Padma).     Objective:     Vital Signs (Most Recent):  Temp: 96.7 °F (35.9 °C) (12/02/22 1607)  Pulse: 66 (12/02/22 1607)  Resp: 20 (12/02/22 1607)  BP: (!) 104/53 (12/02/22 1607)  SpO2: 96 % (12/02/22 1607)   Vital Signs (24h Range):  Temp:  [96.7 °F (35.9 °C)-99.2 °F (37.3 °C)] 96.7 °F (35.9 °C)  Pulse:  [66-72] 66  Resp:  [17-20] 20  SpO2:  [94 %-99 %] 96 %  BP: (104-120)/(53-63) 104/53     Weight: 57.6 kg (126 lb 15.8 oz)  Body mass index is 23.99 kg/m².  No intake or output data in the 24 hours ending 12/02/22 1747     Physical Exam  Gen: in NAD, appears stated age  Neuro: AAOx3, motor, sensory, and strength grossly intact BL  HEENT: NTNC, EOMI, PERRL, MMM, trialysis cath of Lt IJ  CVS: RRR, no m/r/g; S1/S2 auscultated with no S3 or S4; capillary refill < 2 sec  Resp: lungs CTAB, no w/r/r; no belabored breathing or accessory muscle use appreciated   Abd: BS+ in all 4 quadrants; NTND, soft to palpation; no organomegaly appreciated   Extrem: pulses full, equal, and regular over all 4 extremities; slight edema of BL LE, no UE edema    Significant Labs: All pertinent labs within the past 24 hours have been reviewed.  BMP:   Recent Labs   Lab 12/02/22 0423   GLU 71      K 4.3   CL 98   CO2 24   BUN 62*   CREATININE 8.1*   CALCIUM 7.7*   MG 1.9     CBC:   Recent Labs   Lab 12/01/22  0344 12/02/22 0423   WBC 11.59 12.24   HGB 8.5* 8.7*   HCT 26.5* 27.8*   * 127*     CMP:   Recent Labs   Lab 12/01/22  0344 12/02/22  0423    137   K 3.5 4.3   CL 99 98   CO2 25 24   GLU 67* 71   BUN 57* 62*   CREATININE 7.4* 8.1*   CALCIUM 7.8* 7.7*   ANIONGAP 14 15       Significant Imaging: I have reviewed all pertinent imaging results/findings within the past 24 hours.      Assessment/Plan:      * Microscopic polyangiitis  As of 10/26/22 strongly positive MPO  Ab (in contrast to borderline positive PR3), consistent with clinical picture of microscopic polyangiitis with pulmonary, and renal involvement after presenting with acute hypoxemic respiratory failure, hemoptysis, and acute renal failure.  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   3)  CHANGES SUGGESTIVE OF FOCAL ACUTE PYELONEPHRITIS.   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 Now following PEXIVAS steroid taper. Currently on oral steroid taper   - plan for 20mg IV daily on 11/6 for 14 days (last dose of 20mg equivalent is 11/20/2022).   - apheresis: underwent PLEX 10/26, 10/27, 10/30, 11/1, 11/2, 11/3  - rituximab every 7 days for 4 doses: Dose #1: 10/27, #2 11/3, #3 11/10, and #4 given 11/17  - cyclophosphamide every 14 days for 2 doses: 10/27, 11/10  - Opportunistic Infection ppx: atovaquone 1500mg PO daily  - GI bleed prophylaxis: pantoprazole 40mg daily     Anemia due to chronic kidney disease  - 2/2 CKD  - Hg stable  - Monitor trend     Primary pauci-immune necrotizing and crescentic glomerulonephritis  Patient with acute kidney injury likely due to microscopic polyangiitis WILFREDO is currently  stable. Labs reviewed- Renal function/electrolytes with Estimated Creatinine Clearance: 4.9 mL/min (A) (based on SCr of 8.1 mg/dL (H)). according to latest data. Monitor urine output and serial BMP and adjust therapy as needed. Avoid nephrotoxins and renally dose meds for GFR listed above.     Gastric reflux  - PPI BID    High risk medications (not anticoagulants) long-term use  as above    Moderate malnutrition  Nutrition consulted. Most recent weight and BMI monitored-     Malnutrition (Moderate to Severe)  Weight Loss (Malnutrition): 7.5% in 3 months    Measurements:  Wt Readings from Last 1 Encounters:   11/29/22 57.6 kg (126 lb 15.8 oz)   Body mass index is 23.99 kg/m².    Recommendations: Recommendation/Intervention: 1.  Goals: Meet % EEN, EPN by RD f/u date    Patient has been screened and assessed by RD. RD will follow patient.      Encounter for continuous renal replacement therapy (CRRT) for acute renal failure  Due to microscopic polyangiitis. Remains on hemodialysis intermittently.   - Baseline creatinine 1.0-1.2.   - New onset proteinuria/hematuria.   - Renal biopsy completed and   - Trialysis in place for intermittent Hemodialysis    Plan  - Nephrology consulted, appreciate management  - management of MPA as noted separately  - Renal function panel daily  - renally dose all medications  - intermittent Hemodialysis as indicated, per Nephrology--> still evaluating daily for HD need and permacath placement- if needing permacath placement, then will need to be done prior to dc and outpatient HD chair arranged.      Recent Labs   Lab 11/30/22  0407 12/01/22  0344 12/02/22  0423   BUN 53* 57* 62*   CREATININE 6.8* 7.4* 8.1*       Acute hypoxemic respiratory failure-RESOLVED  Multifocal pneumonia  Hemoptysis  Dyspnea  Diffuse Alveolar Hemorrahge    In the setting of a new diagnosis of microscopic polyangiitis, presented with fevers, dyspnea,.  - Chest imaging was consistent with diffuse alveolar  hemorrhage.  CT chest wc 10/17 with bl perihilar dense consolidations w/ peripheral patcy areas of GGO, BL PNA, less c/f cardiogenic pulmonary edema.  - Patient upgraded to Medical ICU 10/23/22 with abrupt respiratory decline requiring NIPPV, improved with high dose IV steroids, plasmapheresis, emergent hemodialysis.   - Unable to perform bronchoscopy in the Medical ICU due to tenuous respiratory status  Hypoxia has resolved    Plan:  - weaned to room air  - continue management of underlying triggers with steroid and immunosuppressants  - incentive spirometry and respiratory hygiene    Lymphadenopathy of head and neck  - Has bilateral neck gland swelling with tenderness,consulted ENT  - No surgical intervention indicated   - Adenopathy likely reactive in nature   - Recommend further workup if it does not resolve within next 2 weeks   - 11/30-- Adenopathy is still present especially right submandibular region    Other specified anemias  - Received 3u pRBC- Hg stable  - GI evaluated during hospitalization    Chronic diastolic heart failure  Pulmonary Hypertension due to left heart disease  TTE (10/2022) EF 65%, G1DD  Home meds: Lisinopril, Toprol    Plan  - Active volume control with intermittent Hemodialysis per Nephrology   - Daily weights (standing if tolerated)  - Strict I/Os  - Fluid restriction 1.5 day  - Cardiac diet w/ 2 g Na restriction    Hyperkalemia  - resolved    Primary hypertension  BP Readings from Last 1 Encounters:   12/02/22 (!) 104/53     - Patient is unable to tolerate PO mediations, all meds to be administered via NG tube  - Will continue to monitor blood pressure trend closely and adjust antihypertensive regimen as clinically indicated and tolerated.    amLODIPine tablet 10 mg, 10 mg, Per PO, Daily    carvediloL tablet 12.5 mg, 12.5 mg, Per PO, BID    hydrALAZINE tablet 25 mg, 25 mg, Per PO, Q8H    lisinopriL tablet 40 mg, 40 mg, Per PO, Daily        Hypothyroid  - Continue synthroid 25  mcg  - TSH 0.585 10/27/22      VTE Risk Mitigation (From admission, onward)         Ordered     heparin (porcine) injection 1,000 Units  As needed (PRN)         11/29/22 0857     heparin (porcine) injection 1,000 Units  As needed (PRN)         11/22/22 0832     heparin, porcine (PF) 100 unit/mL injection flush 500 Units  As needed (PRN)         11/17/22 1343     heparin, porcine (PF) 100 unit/mL injection flush 500 Units  As needed (PRN)         11/10/22 1430     heparin (porcine) injection 1,000 Units  As needed (PRN)         11/09/22 1601     heparin (porcine) injection 1,000 Units  As needed (PRN)         11/08/22 0854     Place sequential compression device  Until discontinued         10/25/22 1731     IP VTE HIGH RISK PATIENT  Once         10/17/22 1354     Reason for No Pharmacological VTE Prophylaxis  Once        Question:  Reasons:  Answer:  Risk of Bleeding    10/17/22 1354                Discharge Planning   ANTHONY: 12/8/2022     Code Status: Full Code   Is the patient medically ready for discharge?: No    Reason for patient still in hospital (select all that apply): Patient trending condition  Discharge Plan A: Skilled Nursing Facility   Discharge Delays: None known at this time                Merlene Mcdaniel MD  Department of Hospital Medicine   [facility] - Intensive Care ([place])

## 2022-12-02 NOTE — ASSESSMENT & PLAN NOTE
- Has bilateral neck gland swelling with tenderness,consulted ENT  - No surgical intervention indicated   - Adenopathy likely reactive in nature   - Recommend further workup if it does not resolve within next 2 weeks   - 11/30-- Adenopathy is still present especially right submandibular region

## 2022-12-02 NOTE — PROGRESS NOTES
[facility] - [facility] ([address] Medicine  Progress Note    Patient Name: Kristin Goodman  MRN: [id number]  Patient Class: IP- Inpatient   Admission Date: 10/17/2022  Length of Stay: 45 days  Attending Physician: Merlene Mcdaniel MD  Primary Care Provider: Lori Hernandez MD        Subjective:     Principal Problem:Microscopic polyangiitis        HPI:  Kristin Goodman is a 77 y.o. female with a past medical history of HTN, hypothyroidism, HFpEF, and osteoarthritis of the arm who has presented to the ED for cough, SOB, and weakness. Daughter is present at the bedside. Patient presented to the ED on 9/24 with hemoptysis, CT and CXR showed high suspicion for multilobar pneumonia. Patient was discharged with a 10-day course of levofloxacin and an albuterol inhaler. Patient followed up with her PCP on 10/4 with slight improvement in symptoms and went back to work. Patient continued to have worsening symptoms and presented to the ED again on 10/14 for evaluation and no interventions were done. Patient endorses fevers over the last few days up to 102.4 F with progressive SOB. She endorses hemoptysis with moderate amount of blood, generalized weakness, productive cough, SOB, and loss of appetite. Denies chest pain, nausea, vomiting, abdominal pain, or urinary changes.    ED: hypertensive up to 219/93 and tachycardic up to 117. Oxygen saturation on 92% on RA, placed on 5L NC with sats >97%. CBC remarkable for leukocytosis of 16.03 and Hb 7.7. K 3.3. Cr 1.6, baseline ~1.0. . Troponin 0.061. COVID and flu negative. EKG NSR. CT chest with contrast pending at time of admission. Given home amlodipine and lisinopril. Given 500mL NS, IV azithromycin, cefepime and vanc.       Overview/Hospital Course:  HM Course:  Ms. Goodman was admitted to Hospital Medicine for management of multifocal pneumonia and acute hypoxemic respiratory failure after presenting to the ED with fevers, cough, hemoptysis, and dyspnea.  She required escalation to the MICU due to abrupt decline in her respiratory status, associated with hemoptysis and requiring NIPPV. CT chest showed bilateral patchy ground glass opacities. Labs additionally were notable for acute renal failure on CKD3. Pulmonology was consulted while under the care of [facility] and felt this picture was consistent with diffuse alveolar hemorrhage.     ICU Course:   Her workup revealed a strongly positive MPO Ab consistent with clinical picture of microscopic polyangiitis with pulmonary and renal involvement. She underwent Trialysis catheter placement 10/25/2022 in the Medical ICU. 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     Rheumatology was consulted, extensive workup revealed MITUL + 1:2560 homogenous, +dsDNA, normal complements, PR3 1.2 (slight +),  (+), cANCA + 1:80, pANCA neg, GBMAb neg, trace cryos. Due to concern for MPA, she was started on pulse dose IV methylprednisolone 1000mg for 3 days, and plasmapheresis under the guidance of Transfusion Medicine. Patient was placed on steroid taper and completed PLEX. She additionally received rituximab and cyclophosphamide. OI prophylaxis was provided with atovaquone due to a sulfa allergy.     Nephrology was consulted for evaluation of acute renal failure in the setting of MPA. She did require SLED in the ICU for renal clearance and remains on intermittent hemodialysis. She is expected to continue HD once discharged.     Her acute hypoxemic respiratory failure improved and she was weaned off of supplemental oxygen. She stepped down to [facility] 10/28.     HM Course:  She underwent MBBS for evaluation of dysphagia, which showed global delayed initiation of swallow and  aspiration with thin liquids. Due to concern for possible prior stroke, head imaging was completed and negative for acute finding. She was NPO with NG tube. ENT was consulted for evaluation of dysphagia and cervical adenopathy.  Patient did not tolerate laryngoscopy; unable to visualize vocal cords but given her lack of hoarseness, they did not suspect vocal fold paresis/paralysis. MRI recommended by rheum to fully rule out any potential neurological cause of the patient's dysphagia; MRI demonstrated remote left thalamic lacunar type infarct and punctate remote left cerebellar infarcts.  She continued to work with speech therapy.  EGD completed 11/14 without abnormalities.  Per GI, Suspect some aspect of esophageal dysmotility in setting of critical illness. No further testing at this time.  Per SLP, diet advanced on 11/15 and NG tube removed.    Her other chronic medical conditions including hypothyroidism, diastolic CHF, and hypertension were managed with her home medications, with dose adjustments as needed.     Patient was noted to have downtrending Hg 11/17- required 1u pRBC. Rheum continued to follow for further steroid dosing. Patient was started on 2g NaCl tab TID for SIADH. SLP recommended mechanical soft diet with thin liquids 11/17. Na normalized 11/18. Hg improved to 8.5 following 1u pRBC. Patient had some hyperkalemia- started on scheduled lokelma. Patient continued to have improved UOP; however, BUN: Cr ratio uptrended. Nephro decided to hang off on HD as patient was having 500-600mL/24hr. He was started on scheduled NaHCO3 as ewll. Patient underwent HD 11/23. Patient had drop in Hg once again- 6.5 on 11/25. Given 1u pRBC (2nd unit). Underwent HD 11/30. Renal continued to debate long-term need for RRT.      Interval History: Patient was seen and evaluated this am. No complaints at this time. No nausea, vomiting, fevers, chills, night sweats, abd pain, diarrhea, constipation, chest pain, cough,  shortness of breath.     Objective:     Vital Signs (Most Recent):  Temp: 97.8 °F (36.6 °C) (12/01/22 1524)  Pulse: 64 (12/01/22 1524)  Resp: 18 (12/01/22 1524)  BP: (!) 113/57 (12/01/22 1524)  SpO2: 96 % (12/01/22 1524)   Vital Signs (24h Range):  Temp:  [97.8 °F (36.6 °C)-98.5 °F (36.9 °C)] 97.8 °F (36.6 °C)  Pulse:  [61-72] 64  Resp:  [10-18] 18  SpO2:  [92 %-96 %] 96 %  BP: (113-145)/(53-62) 113/57     Weight: 57.6 kg (126 lb 15.8 oz)  Body mass index is 23.99 kg/m².  No intake or output data in the 24 hours ending 12/01/22 1923     Physical Exam  Gen: in NAD, appears stated age  Neuro: AAOx3, motor, sensory, and strength grossly intact BL  HEENT: NTNC, EOMI, PERRL, MMM, trialysis cath of Lt IJ  CVS: RRR, no m/r/g; S1/S2 auscultated with no S3 or S4; capillary refill < 2 sec  Resp: lungs CTAB, no w/r/r; no belabored breathing or accessory muscle use appreciated   Abd: BS+ in all 4 quadrants; NTND, soft to palpation; no organomegaly appreciated   Extrem: pulses full, equal, and regular over all 4 extremities; slight edema of BL LE, no UE edema    Significant Labs: All pertinent labs within the past 24 hours have been reviewed.  BMP:   Recent Labs   Lab 12/01/22 0344   GLU 67*      K 3.5   CL 99   CO2 25   BUN 57*   CREATININE 7.4*   CALCIUM 7.8*   MG 2.0     CBC:   Recent Labs   Lab 11/30/22 0407 12/01/22 0344   WBC 12.06 11.59   HGB 8.7* 8.5*   HCT 27.4* 26.5*   * 121*     CMP:   Recent Labs   Lab 11/30/22 0407 12/01/22 0344    138   K 4.1 3.5    99   CO2 23 25   GLU 67* 67*   BUN 53* 57*   CREATININE 6.8* 7.4*   CALCIUM 7.7* 7.8*   ANIONGAP 13 14       Significant Imaging: I have reviewed all pertinent imaging results/findings within the past 24 hours.      Assessment/Plan:      * Microscopic polyangiitis  As of 10/26/22 strongly positive MPO Ab (in contrast to borderline positive PR3), consistent with clinical picture of microscopic polyangiitis with pulmonary, and renal  involvement after presenting with acute hypoxemic respiratory failure, hemoptysis, and acute renal failure.  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   3)  CHANGES SUGGESTIVE OF FOCAL ACUTE PYELONEPHRITIS.   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 Now following PEXIVAS steroid taper. Currently on oral steroid taper   - plan for 20mg IV daily on 11/6 for 14 days (last dose of 20mg equivalent is 11/20/2022).   - apheresis: underwent PLEX 10/26, 10/27, 10/30, 11/1, 11/2, 11/3  - rituximab every 7 days for 4 doses: Dose #1: 10/27, #2 11/3, #3 11/10, and #4 given 11/17  - cyclophosphamide every 14 days for 2 doses: 10/27, 11/10  - Opportunistic Infection ppx: atovaquone 1500mg PO daily  - GI bleed prophylaxis: pantoprazole 40mg daily     Anemia due to chronic kidney disease  - 2/2 CKD  - Hg stable  - Monitor trend     Primary pauci-immune necrotizing and crescentic glomerulonephritis  Patient with acute kidney injury likely due to microscopic polyangiitis WILFREDO is currently stable. Labs reviewed- Renal function/electrolytes with Estimated Creatinine Clearance: 5.4 mL/min (A) (based on SCr of 7.4 mg/dL (H)).  according to latest data. Monitor urine output and serial BMP and adjust therapy as needed. Avoid nephrotoxins and renally dose meds for GFR listed above.       Gastric reflux  - PPI BID      High risk medications (not anticoagulants) long-term use  as above      Anuria  - Improving urine output  - Nephrology following- re-evaluating daily for need for iHD and permacath placement     Gastrointestinal hemorrhage with melena  Starting having hemoptysis and melena with associated acute blood loss anemia earlier in hospital stay. Patient with known internal hemorrhoids, mild sigmoid diverticulosis, history of gastritis and + H pylori (2012 EGD).   - GI consulted 10/19, unable to perform EGD due to instability and respiratory failure at the time    Plan  - PPI PO BID   - Monitor CBC closely for signs of recurrent bleed  - EGD w/no acute bleed seen  - Transfused 2u pRBC    Dysphagia  SLP following  MBSS showing global weakness in swallowing as well as aspiration with thin liquids.   ENT consulted but patient did not tolerate laryngoscopy     Plan   - Dysphagia possibly 2/2 previous stroke  - Briefly required NGT, worked SLP  - NGT removed- being treated with nystatin swish and swallow for thrush  - GI consulted as well for concern of oropharyngeal candidiasis--> Low suspicion for esophageal candidiasis without odynophagia however can have if white plaques have been seen on oropharynx, ok to start fluconazole empirically for possible esophageal candidiasis  - EGD on 11/14 without abnormality; per GI, Suspect some aspect of esophageal dysmotility in setting of critical illness. No further testing at this time.  - Advanced to renal diet 11/22    Moderate malnutrition  Nutrition consulted. Most recent weight and BMI monitored-     Malnutrition (Moderate to Severe)  Weight Loss (Malnutrition): 7.5% in 3 months    Measurements:  Wt Readings from Last 1 Encounters:   11/29/22 57.6 kg (126 lb 15.8 oz)   Body mass index is 23.99  kg/m².    Recommendations: Recommendation/Intervention: 1.  Goals: Meet % EEN, EPN by RD f/u date    Patient has been screened and assessed by RD. RD will follow patient.      Encounter for continuous renal replacement therapy (CRRT) for acute renal failure  Due to microscopic polyangiitis. Remains on hemodialysis intermittently.   - Baseline creatinine 1.0-1.2.   - New onset proteinuria/hematuria.   - Renal biopsy completed and   - Trialysis in place for intermittent Hemodialysis    Plan  - Nephrology consulted, appreciate management  - management of MPA as noted separately  - Renal function panel daily  - renally dose all medications  - intermittent Hemodialysis as indicated, per Nephrology--> still evaluating daily for HD need and permacath placement- if needing permacath placement, then will need to be done prior to dc and outpatient HD chair arranged.      Recent Labs   Lab 11/29/22  0508 11/30/22  0407 12/01/22  0344   BUN 98* 53* 57*   CREATININE 11.3* 6.8* 7.4*       Acute hypoxemic respiratory failure  Multifocal pneumonia  Hemoptysis  Dyspnea  Diffuse Alveolar Hemorrahge  In the setting of a new diagnosis of microscopic polyangiitis, presented with fevers, dyspnea,.  - Chest imaging was consistent with diffuse alveolar hemorrhage.  CT chest wc 10/17 with bl perihilar dense consolidations w/ peripheral patcy areas of GGO, BL PNA, less c/f cardiogenic pulmonary edema.  - Patient upgraded to Medical ICU 10/23/22 with abrupt respiratory decline requiring NIPPV, improved with high dose IV steroids, plasmapheresis, emergent hemodialysis.   - Unable to perform bronchoscopy in the Medical ICU due to tenuous respiratory status  - As of 11/6, hypoxemia has significantly improved    Plan:  - weaned to room air  - continue management of underlying triggers with steroid and immunosuppressants  - incentive spirometry and respiratory hygiene    Lymphadenopathy of head and neck  - Has bilateral neck gland swelling  with tenderness,consulted ENT  - No surgical intervention indicated   - Adenopathy likely reactive in nature   - Recommend further workup if it does not resolve within next 2 weeks   - 11/30-- Adenopathy is still present especially right submandibular region    Hyponatremia  - thought to be 2/2 SIADH initially- received NaCl tabs  - NaCl tabs removed  - Na stable- 137      Other specified anemias  In the setting of GI bleed with melena  - Evaluated by GI, see GI bleed documentation  - Last transfusion 10/28, Hgb slowly trending down since then  - Hgb 6.9 on 11/5      Plan  - Monitor CBC Daily   - Treat with pRBC transfusion PRN Hgb <7  - Transfused 3u pRBC    Current CBC reviewed-    Recent Labs   Lab 11/29/22  0508 11/30/22  0407 12/01/22  0344   HGB 8.4* 8.7* 8.5*       Chronic diastolic heart failure  Pulmonary Hypertension due to left heart disease  TTE (10/2022) EF 65%, G1DD  Home meds: Lisinopril, Toprol    Plan  - Active volume control with intermittent Hemodialysis per Nephrology   - Daily weights (standing if tolerated)  - Strict I/Os  - Fluid restriction 1.5 day  - Cardiac diet w/ 2 g Na restriction    Hyperkalemia  - resolved    Primary hypertension  BP Readings from Last 1 Encounters:   12/01/22 (!) 120/58     - Patient is unable to tolerate PO mediations, all meds to be administered via NG tube  - Will continue to monitor blood pressure trend closely and adjust antihypertensive regimen as clinically indicated and tolerated.    amLODIPine tablet 10 mg, 10 mg, Per PO, Daily    carvediloL tablet 12.5 mg, 12.5 mg, Per PO, BID    hydrALAZINE tablet 25 mg, 25 mg, Per PO, Q8H    lisinopriL tablet 40 mg, 40 mg, Per PO, Daily        Hypothyroid  - Continue synthroid 25 mcg  - TSH 0.585 10/27/22      VTE Risk Mitigation (From admission, onward)         Ordered     heparin (porcine) injection 1,000 Units  As needed (PRN)         11/29/22 0857     heparin (porcine) injection 1,000 Units  As needed (PRN)          11/22/22 0832     heparin, porcine (PF) 100 unit/mL injection flush 500 Units  As needed (PRN)         11/17/22 1343     heparin, porcine (PF) 100 unit/mL injection flush 500 Units  As needed (PRN)         11/10/22 1430     heparin (porcine) injection 1,000 Units  As needed (PRN)         11/09/22 1601     heparin (porcine) injection 1,000 Units  As needed (PRN)         11/08/22 0854     Place sequential compression device  Until discontinued         10/25/22 1731     IP VTE HIGH RISK PATIENT  Once         10/17/22 1354     Reason for No Pharmacological VTE Prophylaxis  Once        Question:  Reasons:  Answer:  Risk of Bleeding    10/17/22 1354                Discharge Planning   ANTHONY: 12/8/2022     Code Status: Full Code   Is the patient medically ready for discharge?: No    Reason for patient still in hospital (select all that apply): Patient trending condition  Discharge Plan A: Skilled Nursing Facility   Discharge Delays: None known at this time                Merlene Mcdaniel MD  Department of Hospital Medicine   [facility] - Intensive Care ([place]-[facility])

## 2022-12-02 NOTE — ASSESSMENT & PLAN NOTE
In the setting of GI bleed with melena  - Evaluated by GI, see GI bleed documentation  - Last transfusion 10/28, Hgb slowly trending down since then  - Hgb 6.9 on 11/5      Plan  - Monitor CBC Daily   - Treat with pRBC transfusion PRN Hgb <7  - Transfused 3u pRBC    Current CBC reviewed-    Recent Labs   Lab 11/29/22  0508 11/30/22  0407 12/01/22  0344   HGB 8.4* 8.7* 8.5*

## 2022-12-02 NOTE — SUBJECTIVE & OBJECTIVE
Interval History: Patient evaluated at bedside. No acute events overnight. Refers feeling well, no complaints this AM. I/Os not charted however pt reports urinating well and remains without dyspnea on room air.     Review of patient's allergies indicates:   Allergen Reactions    Ampicillin     Peaches [peach (prunus persica)] Other (See Comments)     Pt unable to state type of reaction. Information obtained from daughter who states she was informed of allergy from patient.    Penicillins      Other reaction(s): Hives    Sulfa (sulfonamide antibiotics) Rash and Hives     Current Facility-Administered Medications   Medication Frequency    0.9%  NaCl infusion (for blood administration) Q24H PRN    0.9%  NaCl infusion (for blood administration) Q24H PRN    0.9%  NaCl infusion (for blood administration) Q24H PRN    0.9%  NaCl infusion Once    acetaminophen tablet 650 mg Q4H PRN    albuterol-ipratropium 2.5 mg-0.5 mg/3 mL nebulizer solution 3 mL Q4H PRN    aluminum-magnesium hydroxide-simethicone 200-200-20 mg/5 mL suspension 30 mL QID PRN    amLODIPine tablet 10 mg Daily    atovaquone 750 mg/5 mL oral liquid 1,500 mg Daily    bisacodyL suppository 10 mg Daily PRN    carvediloL tablet 25 mg BID    cloNIDine tablet 0.1 mg Q6H PRN    dextrose 10% bolus 125 mL PRN    dextrose 10% bolus 250 mL PRN    epoetin hira injection 2,880 Units Every Mon, Wed, Fri    glucagon (human recombinant) injection 1 mg PRN    glucose chewable tablet 16 g PRN    glucose chewable tablet 24 g PRN    heparin (porcine) injection 1,000 Units PRN    heparin (porcine) injection 1,000 Units PRN    heparin (porcine) injection 1,000 Units PRN    heparin, porcine (PF) 100 unit/mL injection flush 500 Units PRN    heparin, porcine (PF) 100 unit/mL injection flush 500 Units PRN    hydrALAZINE tablet 100 mg Q8H    insulin aspart U-100 pen 1-10 Units Q6H PRN    levothyroxine tablet 25 mcg Before breakfast    melatonin tablet 6 mg Nightly PRN    methocarbamoL  tablet 500 mg TID PRN    metoclopramide HCl injection 5 mg Q6H PRN    naloxone 0.4 mg/mL injection 0.02 mg PRN    ondansetron injection 4 mg Q8H PRN    pantoprazole EC tablet 40 mg BID AC    predniSONE tablet 20 mg Daily    Followed by    [START ON 12/4/2022] predniSONE tablet 12.5 mg Daily    Followed by    [START ON 12/18/2022] predniSONE tablet 10 mg Daily    Followed by    [START ON 1/1/2023] predniSONE tablet 5 mg Daily    prochlorperazine injection Soln 5 mg Q6H PRN    QUEtiapine tablet 25 mg QHS    simethicone chewable tablet 80 mg QID PRN    sodium bicarbonate tablet 650 mg BID    sodium chloride 0.9% 250 mL flush bag 1 time in Clinic/HOD    sodium chloride 0.9% bolus 250 mL PRN    sodium chloride 0.9% bolus 250 mL PRN    sodium chloride 0.9% flush 10 mL PRN    sodium chloride 0.9% flush 10 mL PRN    sodium chloride 0.9% flush 10 mL PRN    sodium chloride 0.9% flush 10 mL PRN    sodium chloride 0.9% flush 5 mL PRN    sucralfate tablet 1 g TID PRN     Facility-Administered Medications Ordered in Other Encounters   Medication Frequency    celecoxib capsule 400 mg Once    fentaNYL 50 mcg/mL injection  mcg PRN    LIDOcaine (PF) 10 mg/ml (1%) injection 10 mg Once PRN    LIDOcaine (PF) 10 mg/ml (1%) injection 10 mg Once    midazolam (VERSED) 1 mg/mL injection 0.5-4 mg PRN    ropivacaine 0.2% [facility] PainPRO Pump infusion 500 ML Continuous       Objective:     Vital Signs (Most Recent):  Temp: 98 °F (36.7 °C) (12/02/22 1217)  Pulse: 72 (12/02/22 0818)  Resp: 20 (12/02/22 1217)  BP: 106/63 (12/02/22 1217)  SpO2: (!) 94 % (12/02/22 1217)  O2 Device (Oxygen Therapy): room air (11/29/22 8806)   Vital Signs (24h Range):  Temp:  [97.8 °F (36.6 °C)-99.2 °F (37.3 °C)] 98 °F (36.7 °C)  Pulse:  [64-72] 72  Resp:  [17-20] 20  SpO2:  [94 %-99 %] 94 %  BP: (106-120)/(55-63) 106/63     Weight: 57.6 kg (126 lb 15.8 oz) (11/29/22 1000)  Body mass index is 23.99 kg/m².  Body surface area is 1.57 meters squared.    No  intake/output data recorded.    Physical Exam  Vitals and nursing note reviewed.   Constitutional:       General: She is awake.      Appearance: She is well-developed. She is ill-appearing. She is not toxic-appearing or diaphoretic.   HENT:      Head: Normocephalic and atraumatic.      Right Ear: External ear normal.      Left Ear: External ear normal.      Nose:      Comments: + NGT     Mouth/Throat:      Mouth: Mucous membranes are dry.   Eyes:      General: Lids are normal. No scleral icterus.        Right eye: No discharge.         Left eye: No discharge.   Cardiovascular:      Rate and Rhythm: Normal rate and regular rhythm.   Pulmonary:      Effort: Pulmonary effort is normal.      Breath sounds: Normal breath sounds. No wheezing or rhonchi.   Abdominal:      General: Bowel sounds are normal.      Palpations: Abdomen is soft.      Tenderness: There is no abdominal tenderness.   Musculoskeletal:         General: No deformity.      Cervical back: Normal range of motion and neck supple. No rigidity or tenderness.      Right lower leg: No edema.      Left lower leg: No edema.   Skin:     General: Skin is warm and dry.   Neurological:      General: No focal deficit present.      Mental Status: She is alert and oriented to person, place, and time. Mental status is at baseline.   Psychiatric:         Attention and Perception: Attention normal.         Behavior: Behavior normal.       Significant Labs:  CBC:   Recent Labs   Lab 12/02/22  0423   WBC 12.24   RBC 3.06*   HGB 8.7*   HCT 27.8*   *   MCV 91   MCH 28.4   MCHC 31.3*       CMP:   Recent Labs   Lab 11/26/22  1408 11/27/22  0720 12/02/22  0423   *   < > 71   CALCIUM 7.4*   < > 7.7*   ALBUMIN 2.5*  --   --    *   < > 137   K 4.1   < > 4.3   CO2 22*   < > 24   CL 98   < > 98   BUN 94*   < > 62*   CREATININE 9.8*   < > 8.1*    < > = values in this interval not displayed.       All labs within the past 24 hours have been reviewed.

## 2022-12-02 NOTE — PT/OT/SLP PROGRESS
Occupational Therapy   Treatment    Name: Kristin Goodman  MRN: [id number]  Admitting Diagnosis:  Microscopic polyangiitis  18 Days Post-Op    Recommendations:     Discharge Recommendations: nursing facility, skilled  Discharge Equipment Recommendations:  bedside commode, walker, rolling  Barriers to discharge:  None    Assessment:     Kristin Goodman is a 77 y.o. female with a medical diagnosis of Microscopic polyangiitis.  She presents with good participation and motivation. Pt continues to require (A) with all activities & is at risk for falls. Goals remain appropriate. Performance deficits affecting function are weakness, impaired endurance, impaired self care skills, impaired functional mobility, impaired balance, impaired cardiopulmonary response to activity, decreased upper extremity function, decreased lower extremity function.     Rehab Prognosis:  Good; patient would benefit from acute skilled OT services to address these deficits and reach maximum level of function.       Plan:     Patient to be seen 3 x/week to address the above listed problems via self-care/home management, therapeutic activities, therapeutic exercises, neuromuscular re-education  Plan of Care Expires: 12/29/22  Plan of Care Reviewed with: patient    Subjective   Pt reported that her feet were a little more swollen today.  Pain/Comfort:  Pain Rating 1: 0/10  Pain Rating Post-Intervention 1: 0/10    Objective:     Communicated with: RN prior to session.  Patient found seated in w/c with  (no lines; 2 sisters present during session) upon OT entry to room.    General Precautions: Standard, fall, aspiration   Orthopedic Precautions:N/A   Braces: N/A  Respiratory Status: Room air     Occupational Performance:       Functional Mobility/Transfers:  Patient completed Sit <> Stand Transfer with moderate assistance  with  rolling walker from w/c  Functional Mobility: CGA with functional mobility around room using RW for endurance training.    [facility] 6  Click ADL: 18    Treatment & Education:  Pt performed AROM exercises with BUE in 3 planes x 10 reps each while seated in w/c.  Pt performed theraputty exercises with yellow theraputty in 3 movement patterns to facilitate , pinch & twist.  Provided education on hand placement during transfers using RW as well as pt progress & POC.  Pt had no further questions & when asked whether there were any concerns pt reported none.      Patient left seated in w/c with all lines intact, call button in reach, RN notified, and 2 sisters present    GOALS:   Multidisciplinary Problems       Occupational Therapy Goals          Problem: Occupational Therapy    Goal Priority Disciplines Outcome Interventions   Occupational Therapy Goal     OT, PT/OT Ongoing, Progressing    Description: Goals to be met by: 12/15     Patient will increase functional independence with ADLs by performing:    UE Dressing with Modified [place].  LE Dressing with Modified [place].  Grooming while standing with Modified [place].  Toileting from toilet with Modified [place] for hygiene and clothing management.   Supine to sit with Modified [place].  Step transfer with Modified [place]  Toilet transfer to toilet with Modified [place].  Pt will demonstrate independence with theraputty HEP.                         Time Tracking:     OT Date of Treatment: 12/02/22  OT Start Time: 1406  OT Stop Time: 1430  OT Total Time (min): 24 min    Billable Minutes:Therapeutic Activity 10  Therapeutic Exercise 14    OT/SARAHI: OT     SARAHI Visit Number: 0    12/2/2022

## 2022-12-02 NOTE — ASSESSMENT & PLAN NOTE
Multifocal pneumonia  Hemoptysis  Dyspnea  Diffuse Alveolar Hemorrahge    In the setting of a new diagnosis of microscopic polyangiitis, presented with fevers, dyspnea,.  - Chest imaging was consistent with diffuse alveolar hemorrhage.  CT chest wc 10/17 with bl perihilar dense consolidations w/ peripheral patcy areas of GGO, BL PNA, less c/f cardiogenic pulmonary edema.  - Patient upgraded to Medical ICU 10/23/22 with abrupt respiratory decline requiring NIPPV, improved with high dose IV steroids, plasmapheresis, emergent hemodialysis.   - Unable to perform bronchoscopy in the Medical ICU due to tenuous respiratory status  Hypoxia has resolved    Plan:  - weaned to room air  - continue management of underlying triggers with steroid and immunosuppressants  - incentive spirometry and respiratory hygiene

## 2022-12-02 NOTE — ASSESSMENT & PLAN NOTE
- Improving urine output  - Nephrology following- re-evaluating daily for need for iHD and permacath placement

## 2022-12-02 NOTE — SUBJECTIVE & OBJECTIVE
Interval History: Patient was seen and evaluated this am. No complaints at this time. No nausea, vomiting, fevers, chills, night sweats, abd pain, diarrhea, constipation, chest pain, cough, shortness of breath. Patient does report good UOP despite inadequate documentation.     Updated daughter (Angeles) and sister (Padma).     Objective:     Vital Signs (Most Recent):  Temp: 96.7 °F (35.9 °C) (12/02/22 1607)  Pulse: 66 (12/02/22 1607)  Resp: 20 (12/02/22 1607)  BP: (!) 104/53 (12/02/22 1607)  SpO2: 96 % (12/02/22 1607)   Vital Signs (24h Range):  Temp:  [96.7 °F (35.9 °C)-99.2 °F (37.3 °C)] 96.7 °F (35.9 °C)  Pulse:  [66-72] 66  Resp:  [17-20] 20  SpO2:  [94 %-99 %] 96 %  BP: (104-120)/(53-63) 104/53     Weight: 57.6 kg (126 lb 15.8 oz)  Body mass index is 23.99 kg/m².  No intake or output data in the 24 hours ending 12/02/22 1747     Physical Exam  Gen: in NAD, appears stated age  Neuro: AAOx3, motor, sensory, and strength grossly intact BL  HEENT: NTNC, EOMI, PERRL, MMM, trialysis cath of Lt IJ  CVS: RRR, no m/r/g; S1/S2 auscultated with no S3 or S4; capillary refill < 2 sec  Resp: lungs CTAB, no w/r/r; no belabored breathing or accessory muscle use appreciated   Abd: BS+ in all 4 quadrants; NTND, soft to palpation; no organomegaly appreciated   Extrem: pulses full, equal, and regular over all 4 extremities; slight edema of BL LE, no UE edema    Significant Labs: All pertinent labs within the past 24 hours have been reviewed.  BMP:   Recent Labs   Lab 12/02/22  0423   GLU 71      K 4.3   CL 98   CO2 24   BUN 62*   CREATININE 8.1*   CALCIUM 7.7*   MG 1.9     CBC:   Recent Labs   Lab 12/01/22 0344 12/02/22 0423   WBC 11.59 12.24   HGB 8.5* 8.7*   HCT 26.5* 27.8*   * 127*     CMP:   Recent Labs   Lab 12/01/22 0344 12/02/22 0423    137   K 3.5 4.3   CL 99 98   CO2 25 24   GLU 67* 71   BUN 57* 62*   CREATININE 7.4* 8.1*   CALCIUM 7.8* 7.7*   ANIONGAP 14 15       Significant Imaging: I have reviewed  all pertinent imaging results/findings within the past 24 hours.

## 2022-12-02 NOTE — PROGRESS NOTES
[facility] - [facility] ([place] [address])  Nephrology  Progress Note    Patient Name: Kristin Goodman  MRN: [id number]  Admission Date: 10/17/2022  Hospital Length of Stay: 46 days  Attending Provider: Merlene Mcdaniel MD   Primary Care Physician: Lori Hernandez MD  Principal Problem:Microscopic polyangiitis    Subjective:     HPI: Kristin Goodman is a 77 year old female with hypertension, hypothyroidism, HFpEF who presents for cough, shortness of breath, and weakness.  Patient initially presented to ER on 09/24 with hemoptysis and imaging concerning for multilobar pneumonia at which time she was discharged on a 10 day course of levofloxacin.  Patient initially had some improvement but began having worsening symptoms on 10/14.  Patient describes multiple fevers and progressive shortness of breath.  She continues to have intermittent hemoptysis.  Patient with worsening leukocytosis and limited clinical improvement despite broad-spectrum antibiotics.  She remains on cefepime.  Patient is a noted to have baseline creatinine of 1 as recent as 8/2022 but progressive worsening of creatinine in early October.  On admission patient with creatinine of 1.6 (10/17) with subsequent progression and creatinine of 3.0 at time of consultation (10/23).  Nephrology consulted for acute kidney injury.      Interval History: Patient evaluated at bedside. No acute events overnight. Refers feeling well, no complaints this AM. I/Os not charted however pt reports urinating well and remains without dyspnea on room air.     Review of patient's allergies indicates:   Allergen Reactions    Ampicillin     Peaches [peach (prunus persica)] Other (See Comments)     Pt unable to state type of reaction. Information obtained from daughter who states she was informed of allergy from patient.    Penicillins      Other reaction(s): Hives    Sulfa (sulfonamide antibiotics) Rash and Hives     Current Facility-Administered Medications   Medication Frequency     0.9%  NaCl infusion (for blood administration) Q24H PRN    0.9%  NaCl infusion (for blood administration) Q24H PRN    0.9%  NaCl infusion (for blood administration) Q24H PRN    0.9%  NaCl infusion Once    acetaminophen tablet 650 mg Q4H PRN    albuterol-ipratropium 2.5 mg-0.5 mg/3 mL nebulizer solution 3 mL Q4H PRN    aluminum-magnesium hydroxide-simethicone 200-200-20 mg/5 mL suspension 30 mL QID PRN    amLODIPine tablet 10 mg Daily    atovaquone 750 mg/5 mL oral liquid 1,500 mg Daily    bisacodyL suppository 10 mg Daily PRN    carvediloL tablet 25 mg BID    cloNIDine tablet 0.1 mg Q6H PRN    dextrose 10% bolus 125 mL PRN    dextrose 10% bolus 250 mL PRN    epoetin hira injection 2,880 Units Every Mon, Wed, Fri    glucagon (human recombinant) injection 1 mg PRN    glucose chewable tablet 16 g PRN    glucose chewable tablet 24 g PRN    heparin (porcine) injection 1,000 Units PRN    heparin (porcine) injection 1,000 Units PRN    heparin (porcine) injection 1,000 Units PRN    heparin, porcine (PF) 100 unit/mL injection flush 500 Units PRN    heparin, porcine (PF) 100 unit/mL injection flush 500 Units PRN    hydrALAZINE tablet 100 mg Q8H    insulin aspart U-100 pen 1-10 Units Q6H PRN    levothyroxine tablet 25 mcg Before breakfast    melatonin tablet 6 mg Nightly PRN    methocarbamoL tablet 500 mg TID PRN    metoclopramide HCl injection 5 mg Q6H PRN    naloxone 0.4 mg/mL injection 0.02 mg PRN    ondansetron injection 4 mg Q8H PRN    pantoprazole EC tablet 40 mg BID AC    predniSONE tablet 20 mg Daily    Followed by    [START ON 12/4/2022] predniSONE tablet 12.5 mg Daily    Followed by    [START ON 12/18/2022] predniSONE tablet 10 mg Daily    Followed by    [START ON 1/1/2023] predniSONE tablet 5 mg Daily    prochlorperazine injection Soln 5 mg Q6H PRN    QUEtiapine tablet 25 mg QHS    simethicone chewable tablet 80 mg QID PRN    sodium bicarbonate tablet 650 mg BID    sodium chloride 0.9% 250 mL flush bag 1 time in  Clinic/HOD    sodium chloride 0.9% bolus 250 mL PRN    sodium chloride 0.9% bolus 250 mL PRN    sodium chloride 0.9% flush 10 mL PRN    sodium chloride 0.9% flush 10 mL PRN    sodium chloride 0.9% flush 10 mL PRN    sodium chloride 0.9% flush 10 mL PRN    sodium chloride 0.9% flush 5 mL PRN    sucralfate tablet 1 g TID PRN     Facility-Administered Medications Ordered in Other Encounters   Medication Frequency    celecoxib capsule 400 mg Once    fentaNYL 50 mcg/mL injection  mcg PRN    LIDOcaine (PF) 10 mg/ml (1%) injection 10 mg Once PRN    LIDOcaine (PF) 10 mg/ml (1%) injection 10 mg Once    midazolam (VERSED) 1 mg/mL injection 0.5-4 mg PRN    ropivacaine 0.2% Nimbus PainPRO Pump infusion 500 ML Continuous       Objective:     Vital Signs (Most Recent):  Temp: 98 °F (36.7 °C) (12/02/22 1217)  Pulse: 72 (12/02/22 0818)  Resp: 20 (12/02/22 1217)  BP: 106/63 (12/02/22 1217)  SpO2: (!) 94 % (12/02/22 1217)  O2 Device (Oxygen Therapy): room air (11/29/22 2326)   Vital Signs (24h Range):  Temp:  [97.8 °F (36.6 °C)-99.2 °F (37.3 °C)] 98 °F (36.7 °C)  Pulse:  [64-72] 72  Resp:  [17-20] 20  SpO2:  [94 %-99 %] 94 %  BP: (106-120)/(55-63) 106/63     Weight: 57.6 kg (126 lb 15.8 oz) (11/29/22 1000)  Body mass index is 23.99 kg/m².  Body surface area is 1.57 meters squared.    No intake/output data recorded.    Physical Exam  Vitals and nursing note reviewed.   Constitutional:       General: She is awake.      Appearance: She is well-developed. She is ill-appearing. She is not toxic-appearing or diaphoretic.   HENT:      Head: Normocephalic and atraumatic.      Right Ear: External ear normal.      Left Ear: External ear normal.      Nose:      Comments: + NGT     Mouth/Throat:      Mouth: Mucous membranes are dry.   Eyes:      General: Lids are normal. No scleral icterus.        Right eye: No discharge.         Left eye: No discharge.   Cardiovascular:      Rate and Rhythm: Normal rate and regular rhythm.   Pulmonary:     "  Effort: Pulmonary effort is normal.      Breath sounds: Normal breath sounds. No wheezing or rhonchi.   Abdominal:      General: Bowel sounds are normal.      Palpations: Abdomen is soft.      Tenderness: There is no abdominal tenderness.   Musculoskeletal:         General: No deformity.      Cervical back: Normal range of motion and neck supple. No rigidity or tenderness.      Right lower leg: No edema.      Left lower leg: No edema.   Skin:     General: Skin is warm and dry.   Neurological:      General: No focal deficit present.      Mental Status: She is alert and oriented to person, place, and time. Mental status is at baseline.   Psychiatric:         Attention and Perception: Attention normal.         Behavior: Behavior normal.       Significant Labs:  CBC:   Recent Labs   Lab 12/02/22  0423   WBC 12.24   RBC 3.06*   HGB 8.7*   HCT 27.8*   *   MCV 91   MCH 28.4   MCHC 31.3*       CMP:   Recent Labs   Lab 11/26/22  1408 11/27/22  0720 12/02/22  0423   *   < > 71   CALCIUM 7.4*   < > 7.7*   ALBUMIN 2.5*  --   --    *   < > 137   K 4.1   < > 4.3   CO2 22*   < > 24   CL 98   < > 98   BUN 94*   < > 62*   CREATININE 9.8*   < > 8.1*    < > = values in this interval not displayed.       All labs within the past 24 hours have been reviewed.       Assessment/Plan:     * Microscopic polyangiitis  See "Primary pauci-immune necrotizing and crescentic glomerulonephritis"      Anemia due to chronic kidney disease  EPO TIW    Primary pauci-immune necrotizing and crescentic glomerulonephritis  Kidney biopsy (10/26): pauci immune necrotizing and crescentic glomerulonephritis   s/p IV Solumedrol 1000 mg x3 doses.  PLEX 10/26, 10/27, 10/29, 10/30, 11/1, 11/2, 11/3 (prior to Rituxan)  Rituximab 375 mg/m^2 (600 mg) on 10/27 11/3, 11/10 and 11/17 (completed 4 doses)  Cyclophosphamide 7.5 mg/kg on 10/27 and 11/10 ( every 14 days ); has completed 2 doses; may need another dose of cyclophosphamide 6 weeks after " her last dose (around December 22, 2022)  Serum BUN/Cr slowly trending up; will continue monitoring daily for dialysis needs  Now following PEXIVAS steroid taper; currently on Prednisone 20mg PO daily   Sodium bicarb 650mg PO BID   Continue Atovaquone for prophylaxis   Strict I/Os and chart   Avoid nephrotoxic agents as much as possible  No emergent need for hemodialysis at this time; will continue evaluation on a daily basis              James Gomez MD  Nephrology  [facility] - Intensive Care ([place] [address])    ATTENDING PHYSICIAN ATTESTATION  I have personally verified the history and examined the patient. I thoroughly reviewed the demographic, clinical, laboratorial and imaging information available in medical records. I agree with the assessment and recommendations provided by the subspecialty resident who was under my supervision.

## 2022-12-02 NOTE — SUBJECTIVE & OBJECTIVE
Interval History: Patient was seen and evaluated this am. No complaints at this time. No nausea, vomiting, fevers, chills, night sweats, abd pain, diarrhea, constipation, chest pain, cough, shortness of breath.     Objective:     Vital Signs (Most Recent):  Temp: 97.8 °F (36.6 °C) (12/01/22 1524)  Pulse: 64 (12/01/22 1524)  Resp: 18 (12/01/22 1524)  BP: (!) 113/57 (12/01/22 1524)  SpO2: 96 % (12/01/22 1524)   Vital Signs (24h Range):  Temp:  [97.8 °F (36.6 °C)-98.5 °F (36.9 °C)] 97.8 °F (36.6 °C)  Pulse:  [61-72] 64  Resp:  [10-18] 18  SpO2:  [92 %-96 %] 96 %  BP: (113-145)/(53-62) 113/57     Weight: 57.6 kg (126 lb 15.8 oz)  Body mass index is 23.99 kg/m².  No intake or output data in the 24 hours ending 12/01/22 1923     Physical Exam  Gen: in NAD, appears stated age  Neuro: AAOx3, motor, sensory, and strength grossly intact BL  HEENT: NTNC, EOMI, PERRL, MMM, trialysis cath of Lt IJ  CVS: RRR, no m/r/g; S1/S2 auscultated with no S3 or S4; capillary refill < 2 sec  Resp: lungs CTAB, no w/r/r; no belabored breathing or accessory muscle use appreciated   Abd: BS+ in all 4 quadrants; NTND, soft to palpation; no organomegaly appreciated   Extrem: pulses full, equal, and regular over all 4 extremities; slight edema of BL LE, no UE edema    Significant Labs: All pertinent labs within the past 24 hours have been reviewed.  BMP:   Recent Labs   Lab 12/01/22 0344   GLU 67*      K 3.5   CL 99   CO2 25   BUN 57*   CREATININE 7.4*   CALCIUM 7.8*   MG 2.0     CBC:   Recent Labs   Lab 11/30/22  0407 12/01/22 0344   WBC 12.06 11.59   HGB 8.7* 8.5*   HCT 27.4* 26.5*   * 121*     CMP:   Recent Labs   Lab 11/30/22  0407 12/01/22  0344    138   K 4.1 3.5    99   CO2 23 25   GLU 67* 67*   BUN 53* 57*   CREATININE 6.8* 7.4*   CALCIUM 7.7* 7.8*   ANIONGAP 13 14       Significant Imaging: I have reviewed all pertinent imaging results/findings within the past 24 hours.

## 2022-12-02 NOTE — PLAN OF CARE
Problem: Occupational Therapy  Goal: Occupational Therapy Goal  Description: Goals to be met by: 12/15     Patient will increase functional independence with ADLs by performing:    UE Dressing with Modified [place].  LE Dressing with Modified [place].  Grooming while standing with Modified [place].  Toileting from toilet with Modified [place] for hygiene and clothing management.   Supine to sit with Modified [place].  Step transfer with Modified [place]  Toilet transfer to toilet with Modified [place].  Pt will demonstrate independence with theraputty HEP.    Outcome: Ongoing, Progressing     Goals remain appropriate

## 2022-12-02 NOTE — ASSESSMENT & PLAN NOTE
As of 10/26/22 strongly positive MPO Ab (in contrast to borderline positive PR3), consistent with clinical picture of microscopic polyangiitis with pulmonary, and renal involvement after presenting with acute hypoxemic respiratory failure, hemoptysis, and acute renal failure.  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   3)  CHANGES SUGGESTIVE OF FOCAL ACUTE PYELONEPHRITIS.   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 Now following PEXIVAS steroid taper. Currently on oral steroid taper   - plan for 20mg IV daily on 11/6 for 14 days (last dose of 20mg equivalent is 11/20/2022).   - apheresis: underwent PLEX 10/26, 10/27, 10/30, 11/1, 11/2, 11/3  - rituximab every 7 days for 4 doses: Dose #1: 10/27, #2 11/3, #3 11/10, and #4 given 11/17  - cyclophosphamide every 14 days for 2 doses: 10/27, 11/10  - Opportunistic Infection ppx: atovaquone 1500mg PO daily  - GI bleed prophylaxis: pantoprazole 40mg daily

## 2022-12-03 PROBLEM — Z99.2 ACUTE RENAL FAILURE ON DIALYSIS: Status: ACTIVE | Noted: 2022-10-26

## 2022-12-03 LAB
ANION GAP SERPL CALC-SCNC: 15 MMOL/L (ref 8–16)
BASOPHILS # BLD AUTO: 0.02 K/UL (ref 0–0.2)
BASOPHILS NFR BLD: 0.2 % (ref 0–1.9)
BUN SERPL-MCNC: 71 MG/DL (ref 8–23)
CALCIUM SERPL-MCNC: 7.8 MG/DL (ref 8.7–10.5)
CHLORIDE SERPL-SCNC: 96 MMOL/L (ref 95–110)
CO2 SERPL-SCNC: 24 MMOL/L (ref 23–29)
CREAT SERPL-MCNC: 8.3 MG/DL (ref 0.5–1.4)
DIFFERENTIAL METHOD: ABNORMAL
EOSINOPHIL # BLD AUTO: 0 K/UL (ref 0–0.5)
EOSINOPHIL NFR BLD: 0.1 % (ref 0–8)
ERYTHROCYTE [DISTWIDTH] IN BLOOD BY AUTOMATED COUNT: 15.1 % (ref 11.5–14.5)
EST. GFR  (NO RACE VARIABLE): 4.6 ML/MIN/1.73 M^2
GLUCOSE SERPL-MCNC: 68 MG/DL (ref 70–110)
HCT VFR BLD AUTO: 28 % (ref 37–48.5)
HGB BLD-MCNC: 8.6 G/DL (ref 12–16)
IMM GRANULOCYTES # BLD AUTO: 0.16 K/UL (ref 0–0.04)
IMM GRANULOCYTES NFR BLD AUTO: 1.4 % (ref 0–0.5)
LYMPHOCYTES # BLD AUTO: 1.8 K/UL (ref 1–4.8)
LYMPHOCYTES NFR BLD: 15 % (ref 18–48)
MAGNESIUM SERPL-MCNC: 1.9 MG/DL (ref 1.6–2.6)
MCH RBC QN AUTO: 28.4 PG (ref 27–31)
MCHC RBC AUTO-ENTMCNC: 30.7 G/DL (ref 32–36)
MCV RBC AUTO: 92 FL (ref 82–98)
MONOCYTES # BLD AUTO: 0.7 K/UL (ref 0.3–1)
MONOCYTES NFR BLD: 6.2 % (ref 4–15)
NEUTROPHILS # BLD AUTO: 9 K/UL (ref 1.8–7.7)
NEUTROPHILS NFR BLD: 77.1 % (ref 38–73)
NRBC BLD-RTO: 0 /100 WBC
PHOSPHATE SERPL-MCNC: 5.4 MG/DL (ref 2.7–4.5)
PLATELET # BLD AUTO: 135 K/UL (ref 150–450)
PMV BLD AUTO: 12.2 FL (ref 9.2–12.9)
POCT GLUCOSE: 113 MG/DL (ref 70–110)
POCT GLUCOSE: 118 MG/DL (ref 70–110)
POCT GLUCOSE: 130 MG/DL (ref 70–110)
POCT GLUCOSE: 80 MG/DL (ref 70–110)
POTASSIUM SERPL-SCNC: 4.3 MMOL/L (ref 3.5–5.1)
RBC # BLD AUTO: 3.03 M/UL (ref 4–5.4)
SODIUM SERPL-SCNC: 135 MMOL/L (ref 136–145)
WBC # BLD AUTO: 11.67 K/UL (ref 3.9–12.7)

## 2022-12-03 PROCEDURE — 84100 ASSAY OF PHOSPHORUS: CPT

## 2022-12-03 PROCEDURE — 99233 SBSQ HOSP IP/OBS HIGH 50: CPT | Mod: 95,,, | Performed by: INTERNAL MEDICINE

## 2022-12-03 PROCEDURE — 20600001 HC STEP DOWN PRIVATE ROOM

## 2022-12-03 PROCEDURE — 25000003 PHARM REV CODE 250: Performed by: INTERNAL MEDICINE

## 2022-12-03 PROCEDURE — 80048 BASIC METABOLIC PNL TOTAL CA: CPT

## 2022-12-03 PROCEDURE — 63600175 PHARM REV CODE 636 W HCPCS: Mod: JG | Performed by: INTERNAL MEDICINE

## 2022-12-03 PROCEDURE — 63600175 PHARM REV CODE 636 W HCPCS: Performed by: STUDENT IN AN ORGANIZED HEALTH CARE EDUCATION/TRAINING PROGRAM

## 2022-12-03 PROCEDURE — 90935 HEMODIALYSIS ONE EVALUATION: CPT

## 2022-12-03 PROCEDURE — 25000003 PHARM REV CODE 250: Performed by: HOSPITALIST

## 2022-12-03 PROCEDURE — 85025 COMPLETE CBC W/AUTO DIFF WBC: CPT | Performed by: STUDENT IN AN ORGANIZED HEALTH CARE EDUCATION/TRAINING PROGRAM

## 2022-12-03 PROCEDURE — 99233 PR SUBSEQUENT HOSPITAL CARE,LEVL III: ICD-10-PCS | Mod: 95,,, | Performed by: INTERNAL MEDICINE

## 2022-12-03 PROCEDURE — 83735 ASSAY OF MAGNESIUM: CPT

## 2022-12-03 PROCEDURE — 36415 COLL VENOUS BLD VENIPUNCTURE: CPT

## 2022-12-03 PROCEDURE — 25000003 PHARM REV CODE 250: Performed by: STUDENT IN AN ORGANIZED HEALTH CARE EDUCATION/TRAINING PROGRAM

## 2022-12-03 PROCEDURE — 25000003 PHARM REV CODE 250

## 2022-12-03 RX ORDER — LEVOTHYROXINE SODIUM 25 UG/1
25 TABLET ORAL
Status: DISCONTINUED | OUTPATIENT
Start: 2022-12-04 | End: 2022-12-13 | Stop reason: HOSPADM

## 2022-12-03 RX ORDER — SODIUM CHLORIDE 9 MG/ML
INJECTION, SOLUTION INTRAVENOUS ONCE
Status: CANCELLED | OUTPATIENT
Start: 2022-12-03 | End: 2022-12-03

## 2022-12-03 RX ORDER — AMLODIPINE BESYLATE 10 MG/1
10 TABLET ORAL DAILY
Status: DISCONTINUED | OUTPATIENT
Start: 2022-12-04 | End: 2022-12-04

## 2022-12-03 RX ORDER — SODIUM CHLORIDE 9 MG/ML
INJECTION, SOLUTION INTRAVENOUS
Status: CANCELLED | OUTPATIENT
Start: 2022-12-03

## 2022-12-03 RX ORDER — ATOVAQUONE 750 MG/5ML
1500 SUSPENSION ORAL DAILY
Status: DISCONTINUED | OUTPATIENT
Start: 2022-12-06 | End: 2022-12-13 | Stop reason: HOSPADM

## 2022-12-03 RX ORDER — CARVEDILOL 25 MG/1
25 TABLET ORAL 2 TIMES DAILY
Status: DISCONTINUED | OUTPATIENT
Start: 2022-12-03 | End: 2022-12-04

## 2022-12-03 RX ORDER — CLONIDINE HYDROCHLORIDE 0.1 MG/1
0.1 TABLET ORAL EVERY 8 HOURS PRN
Status: DISCONTINUED | OUTPATIENT
Start: 2022-12-03 | End: 2022-12-13 | Stop reason: HOSPADM

## 2022-12-03 RX ORDER — HYDRALAZINE HYDROCHLORIDE 50 MG/1
100 TABLET, FILM COATED ORAL EVERY 8 HOURS
Status: DISCONTINUED | OUTPATIENT
Start: 2022-12-03 | End: 2022-12-04

## 2022-12-03 RX ADMIN — HEPARIN SODIUM 1000 UNITS: 1000 INJECTION, SOLUTION INTRAVENOUS; SUBCUTANEOUS at 03:12

## 2022-12-03 RX ADMIN — SODIUM BICARBONATE 650 MG TABLET 650 MG: at 08:12

## 2022-12-03 RX ADMIN — PANTOPRAZOLE SODIUM 40 MG: 40 TABLET, DELAYED RELEASE ORAL at 04:12

## 2022-12-03 RX ADMIN — HYDRALAZINE HYDROCHLORIDE 100 MG: 50 TABLET ORAL at 05:12

## 2022-12-03 RX ADMIN — CARVEDILOL 25 MG: 25 TABLET, FILM COATED ORAL at 08:12

## 2022-12-03 RX ADMIN — PREDNISONE 20 MG: 20 TABLET ORAL at 08:12

## 2022-12-03 RX ADMIN — HYDRALAZINE HYDROCHLORIDE 100 MG: 50 TABLET ORAL at 08:12

## 2022-12-03 RX ADMIN — AMLODIPINE BESYLATE 10 MG: 10 TABLET ORAL at 08:12

## 2022-12-03 RX ADMIN — QUETIAPINE FUMARATE 25 MG: 25 TABLET ORAL at 08:12

## 2022-12-03 RX ADMIN — ATOVAQUONE 1500 MG: 750 SUSPENSION ORAL at 08:12

## 2022-12-03 RX ADMIN — ERYTHROPOIETIN 2880 UNITS: 3000 INJECTION, SOLUTION INTRAVENOUS; SUBCUTANEOUS at 03:12

## 2022-12-03 RX ADMIN — LEVOTHYROXINE SODIUM 25 MCG: 25 TABLET ORAL at 05:12

## 2022-12-03 RX ADMIN — HYDRALAZINE HYDROCHLORIDE 100 MG: 50 TABLET ORAL at 10:12

## 2022-12-03 RX ADMIN — PANTOPRAZOLE SODIUM 40 MG: 40 TABLET, DELAYED RELEASE ORAL at 06:12

## 2022-12-03 NOTE — ASSESSMENT & PLAN NOTE
Due to microscopic polyangiitis. Remains on hemodialysis intermittently.   - Baseline creatinine 1.0-1.2.   - New onset proteinuria/hematuria.   - Renal biopsy completed and   - Trialysis in place for intermittent Hemodialysis    Plan  - Nephrology consulted, appreciate management  - management of MPA as noted separately  - Renal function panel daily  - renally dose all medications  - intermittent Hemodialysis as indicated, per Nephrology--> still evaluating daily for HD need and permacath placement- if needing permacath placement, then will need to be done prior to dc and outpatient HD chair arranged.      Recent Labs   Lab 12/01/22  0344 12/02/22  0423 12/03/22  0312   BUN 57* 62* 71*   CREATININE 7.4* 8.1* 8.3*

## 2022-12-03 NOTE — ASSESSMENT & PLAN NOTE
Patient with acute kidney injury likely due to microscopic polyangiitis WILFREDO is currently stable. Labs reviewed- Renal function/electrolytes with Estimated Creatinine Clearance: 5.3 mL/min (A) (based on SCr of 8.3 mg/dL (H)). according to latest data. Monitor urine output and serial BMP and adjust therapy as needed. Avoid nephrotoxins and renally dose meds for GFR listed above.   Nephrology following and patient receives HD as indicated.

## 2022-12-03 NOTE — PROGRESS NOTES
HD treatment complete. Duration of treatment 3 hours and 500cc removed. Treatment was tolerated well and no complications with access to the left IJ catheter. Catheter flushed with NS locked with heparin. Capped and taped.

## 2022-12-03 NOTE — ASSESSMENT & PLAN NOTE
- Has bilateral neck gland swelling with tenderness; consulted ENT  - No surgical intervention indicated   - Adenopathy likely reactive in nature   - Recommend further workup if it does not resolve within next 2 weeks   - 11/30-- Adenopathy is still present especially right submandibular region

## 2022-12-03 NOTE — SUBJECTIVE & OBJECTIVE
This encounter was provided through telemedicine.  Patient was transferred to the telemedicine service on:  12/03/2022   The patient location is: [id number] admitted 10/17/2022 10:12 AM.    Interval History/Overnight Events:   Clinical record since admit reviewed.    Patient able to provide adequate history - daughter at bedside.    Uneventful night - patient feels well - minimal edema unchanged from yesterday - slept well and tolerated breakfast; patient with acute diarrhea and nausea after meds which was self-limited; she is lactose intolerant and atovaquone seems to exacerbate symptoms - no laxatives currently    Review of Systems   Constitutional:  Positive for activity change and fatigue. Negative for fever.   Respiratory:  Negative for shortness of breath.    Cardiovascular:  Positive for leg swelling.   Gastrointestinal:  Positive for diarrhea and nausea. Negative for abdominal pain and vomiting.   Musculoskeletal:  Negative for arthralgias.      Inpatient Medications:  Scheduled Meds:   sodium chloride 0.9%   Intravenous Once    amLODIPine  10 mg Per NG tube Daily    atovaquone  1,500 mg Per NG tube Daily    carvediloL  25 mg Per NG tube BID    epoetin hira (PROCRIT) injection  50 Units/kg Subcutaneous Every Mon, Wed, Fri    hydrALAZINE  100 mg Per NG tube Q8H    levothyroxine  25 mcg Per NG tube Before breakfast    pantoprazole  40 mg Oral BID AC    [START ON 12/4/2022] predniSONE  12.5 mg Oral Daily    Followed by    [START ON 12/18/2022] predniSONE  10 mg Oral Daily    Followed by    [START ON 1/1/2023] predniSONE  5 mg Oral Daily    QUEtiapine  25 mg Oral QHS    sodium bicarbonate  650 mg Oral BID    sodium chloride 0.9% flush bag IVPB   Intravenous 1 time in Clinic/HOD     Continuous Infusions:  PRN Meds:.sodium chloride, sodium chloride, sodium chloride, acetaminophen, albuterol-ipratropium, aluminum-magnesium hydroxide-simethicone, bisacodyL, cloNIDine, dextrose 10%, dextrose 10%, glucagon (human  recombinant), glucose, glucose, heparin (porcine), heparin (porcine), heparin (porcine), heparin, porcine (PF), heparin, porcine (PF), insulin aspart U-100, melatonin, methocarbamoL, metoclopramide HCl, naloxone, ondansetron, prochlorperazine, simethicone, sodium chloride 0.9%, sodium chloride 0.9%, sodium chloride 0.9%, sodium chloride 0.9%, sodium chloride 0.9%, sodium chloride 0.9%, sodium chloride 0.9%, sucralfate      Objective:     Temp:  [96.7 °F (35.9 °C)-98.6 °F (37 °C)] 98.6 °F (37 °C)  Pulse:  [66-71] 70  Resp:  [20] 20  SpO2:  [90 %-96 %] 90 %  BP: (104-144)/(53-63) 106/54      Intake/Output Summary (Last 24 hours) at 12/3/2022 0934  Last data filed at 12/3/2022 0902  Gross per 24 hour   Intake --   Output 300 ml   Net -300 ml        Body mass index is 29.41 kg/m².    Physical Exam  Vitals and nursing note reviewed.   Constitutional:       General: She is not in acute distress.     Appearance: Normal appearance.   HENT:      Head: Normocephalic and atraumatic.   Eyes:      General: No scleral icterus.        Right eye: No discharge.         Left eye: No discharge.      Extraocular Movements: Extraocular movements intact.   Cardiovascular:      Rate and Rhythm: Normal rate.   Pulmonary:      Effort: Pulmonary effort is normal. No tachypnea or respiratory distress.   Musculoskeletal:      Right lower leg: Edema present.      Left lower leg: Edema present.   Neurological:      General: No focal deficit present.      Mental Status: She is alert and oriented to person, place, and time.      Cranial Nerves: No cranial nerve deficit.      Motor: No weakness.   Psychiatric:         Mood and Affect: Mood and affect normal.         Speech: Speech normal.         Behavior: Behavior is cooperative.         Thought Content: Thought content normal.        Labs:  Recent Results (from the past 24 hour(s))   POCT glucose    Collection Time: 12/02/22 12:19 PM   Result Value Ref Range    POCT Glucose 123 (H) 70 - 110 mg/dL    Magnesium    Collection Time: 12/03/22  3:12 AM   Result Value Ref Range    Magnesium 1.9 1.6 - 2.6 mg/dL   Phosphorus    Collection Time: 12/03/22  3:12 AM   Result Value Ref Range    Phosphorus 5.4 (H) 2.7 - 4.5 mg/dL   Basic Metabolic Panel    Collection Time: 12/03/22  3:12 AM   Result Value Ref Range    Sodium 135 (L) 136 - 145 mmol/L    Potassium 4.3 3.5 - 5.1 mmol/L    Chloride 96 95 - 110 mmol/L    CO2 24 23 - 29 mmol/L    Glucose 68 (L) 70 - 110 mg/dL    BUN 71 (H) 8 - 23 mg/dL    Creatinine 8.3 (H) 0.5 - 1.4 mg/dL    Calcium 7.8 (L) 8.7 - 10.5 mg/dL    Anion Gap 15 8 - 16 mmol/L    eGFR 4.6 (A) >60 mL/min/1.73 m^2   CBC Auto Differential    Collection Time: 12/03/22  3:13 AM   Result Value Ref Range    WBC 11.67 3.90 - 12.70 K/uL    RBC 3.03 (L) 4.00 - 5.40 M/uL    Hemoglobin 8.6 (L) 12.0 - 16.0 g/dL    Hematocrit 28.0 (L) 37.0 - 48.5 %    MCV 92 82 - 98 fL    MCH 28.4 27.0 - 31.0 pg    MCHC 30.7 (L) 32.0 - 36.0 g/dL    RDW 15.1 (H) 11.5 - 14.5 %    Platelets 135 (L) 150 - 450 K/uL    MPV 12.2 9.2 - 12.9 fL    Immature Granulocytes 1.4 (H) 0.0 - 0.5 %    Gran # (ANC) 9.0 (H) 1.8 - 7.7 K/uL    Immature Grans (Abs) 0.16 (H) 0.00 - 0.04 K/uL    Lymph # 1.8 1.0 - 4.8 K/uL    Mono # 0.7 0.3 - 1.0 K/uL    Eos # 0.0 0.0 - 0.5 K/uL    Baso # 0.02 0.00 - 0.20 K/uL    nRBC 0 0 /100 WBC    Gran % 77.1 (H) 38.0 - 73.0 %    Lymph % 15.0 (L) 18.0 - 48.0 %    Mono % 6.2 4.0 - 15.0 %    Eosinophil % 0.1 0.0 - 8.0 %    Basophil % 0.2 0.0 - 1.9 %    Differential Method Automated    POCT glucose    Collection Time: 12/03/22  7:33 AM   Result Value Ref Range    POCT Glucose 80 70 - 110 mg/dL        Lab Results   Component Value Date    [id number] Not Detected 10/24/2022       Recent Labs   Lab 12/01/22 0344 12/02/22 0423 12/03/22 0313   WBC 11.59 12.24 11.67   LYMPH 15.1*  1.8 15.6*  1.9 15.0*  1.8   HGB 8.5* 8.7* 8.6*   HCT 26.5* 27.8* 28.0*   * 127* 135*     Recent Labs   Lab 12/01/22 0344 12/02/22 0423  12/03/22  0312    137 135*   K 3.5 4.3 4.3   CL 99 98 96   CO2 25 24 24   BUN 57* 62* 71*   CREATININE 7.4* 8.1* 8.3*   GLU 67* 71 68*   CALCIUM 7.8* 7.7* 7.8*   MG 2.0 1.9 1.9   PHOS 5.4* 5.0* 5.4*     Recent Labs   Lab 11/26/22  1408   ALBUMIN 2.5*        No results for input(s): DDIMER, FERRITIN, CRP, LDH, BNP, TROPONINI, CPK in the last 72 hours.    Invalid input(s): PROCALCITONIN    All labs within the last 24 hours were reviewed.     Microbiology:  Microbiology Results (last 7 days)       ** No results found for the last 168 hours. **              Imaging  ECG Results              EKG 12-lead (Final result)  Result time 10/17/22 14:26:15      Final result by Interface, Lab In [facility] (10/17/22 14:26:15)                   Narrative:    Test Reason : R06.02,    Vent. Rate : 093 BPM     Atrial Rate : 093 BPM     P-R Int : 126 ms          QRS Dur : 070 ms      QT Int : 356 ms       P-R-T Axes : 048 052 030 degrees     QTc Int : 442 ms    Normal sinus rhythm  Normal ECG  When compared with ECG of 14-OCT-2022 14:26,  Limb lead reversal has been corrected  Confirmed by Jc Barbosa MD ([id number]) on 10/17/2022 2:26:04 PM    Referred By: [email or web address]   SELF           Confirmed By:Jc Barbosa MD                                    Results for orders placed during the hospital encounter of 10/17/22    Echo    Interpretation Summary  · The left ventricle is normal in size with normal systolic function. The estimated ejection fraction is 65%.  · Normal right ventricular size with normal right ventricular systolic function.  · Grade I left ventricular diastolic dysfunction.  · Mild tricuspid regurgitation.  · There is pulmonary hypertension. The estimated PA systolic pressure is 56 mmHg.  · Normal to low central venous pressure (3 mmHg).      X-Ray Abdomen AP 1 View  Narrative: EXAMINATION:  XR ABDOMEN AP 1 VIEW    CLINICAL HISTORY:  NGT placement; Dysphagia, unspecified    TECHNIQUE:  AP View(s) of the abdomen was  performed.    COMPARISON:  No 11/20/2022 ne    FINDINGS:  Feeding tube in the stomach.  No significant bowel dilatation.  Impression: See above    Electronically signed by: Ej Strickland MD  Date:    11/14/2022  Time:    11:02      All imaging within the last 24 hours was reviewed.       Discharge Planning   ANTHONY: 12/8/2022     Code Status: Full Code   Is the patient medically ready for discharge?: No    Reason for patient still in hospital (select all that apply): Patient trending condition, Laboratory test, Treatment, Consult recommendations, and Pending disposition  Discharge Plan A: Skilled Nursing Facility   Discharge Delays: None known at this time

## 2022-12-03 NOTE — ASSESSMENT & PLAN NOTE
In the setting of GI bleed with melena  - Evaluated by GI, see GI bleed documentation  - Last transfusion 10/28, Hgb slowly trending down since then  - Hgb 6.9 on 11/5    Plan  - Monitor CBC Daily   - Treat with pRBC transfusion PRN Hgb <7  - Transfused 3u pRBC    Current CBC reviewed-    Recent Labs   Lab 12/01/22  0344 12/02/22  0423 12/03/22  0313   HGB 8.5* 8.7* 8.6*

## 2022-12-03 NOTE — PROGRESS NOTES
[facility] - Intensive Care ([place] [address])  [facility] Medicine  Telemedicine Progress Note    Patient Name: Kristin Goodman  MRN: [id number]  Patient Class: IP- Inpatient   Admission Date: 10/17/2022  Length of Stay: 47 days  Attending Physician: Haleigh Parks MD  Primary Care Provider: Lori Hernandez MD          Subjective:     Principal Problem:Microscopic polyangiitis        HPI:  Kristin Goodman is a 77 y.o. female with a past medical history of HTN, hypothyroidism, HFpEF, and osteoarthritis of the arm who has presented to the ED for cough, SOB, and weakness. Daughter is present at the bedside. Patient presented to the ED on 9/24 with hemoptysis, CT and CXR showed high suspicion for multilobar pneumonia. Patient was discharged with a 10-day course of levofloxacin and an albuterol inhaler. Patient followed up with her PCP on 10/4 with slight improvement in symptoms and went back to work. Patient continued to have worsening symptoms and presented to the ED again on 10/14 for evaluation and no interventions were done. Patient endorses fevers over the last few days up to 102.4 F with progressive SOB. She endorses hemoptysis with moderate amount of blood, generalized weakness, productive cough, SOB, and loss of appetite. Denies chest pain, nausea, vomiting, abdominal pain, or urinary changes.    ED: hypertensive up to 219/93 and tachycardic up to 117. Oxygen saturation on 92% on RA, placed on 5L NC with sats >97%. CBC remarkable for leukocytosis of 16.03 and Hb 7.7. K 3.3. Cr 1.6, baseline ~1.0. . Troponin 0.061. COVID and flu negative. EKG NSR. CT chest with contrast pending at time of admission. Given home amlodipine and lisinopril. Given 500mL NS, IV azithromycin, cefepime and vanc.       Overview/Hospital Course:  HM Course:  Ms. Goodman was admitted to Hospital Medicine for management of multifocal pneumonia and acute hypoxemic respiratory failure after presenting to the ED with fevers, cough,  hemoptysis, and dyspnea. She required escalation to the MICU due to abrupt decline in her respiratory status, associated with hemoptysis and requiring NIPPV. CT chest showed bilateral patchy ground glass opacities. Labs additionally were notable for acute renal failure on CKD3. Pulmonology was consulted while under the care of [facility] and felt this picture was consistent with diffuse alveolar hemorrhage.     ICU Course:   Her workup revealed a strongly positive MPO Ab consistent with clinical picture of microscopic polyangiitis with pulmonary and renal involvement. She underwent Trialysis catheter placement 10/25/2022 in the Medical ICU. 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     Rheumatology was consulted, extensive workup revealed MITUL + 1:2560 homogenous, +dsDNA, normal complements, PR3 1.2 (slight +),  (+), cANCA + 1:80, pANCA neg, GBMAb neg, trace cryos. Due to concern for MPA, she was started on pulse dose IV methylprednisolone 1000mg for 3 days, and plasmapheresis under the guidance of Transfusion Medicine. Patient was placed on steroid taper and completed PLEX. She additionally received rituximab and cyclophosphamide. OI prophylaxis was provided with atovaquone due to a sulfa allergy.     Nephrology was consulted for evaluation of acute renal failure in the setting of MPA. She did require SLED in the ICU for renal clearance and remains on intermittent hemodialysis. She is expected to continue HD once discharged.     Her acute hypoxemic respiratory failure improved and she was weaned off of supplemental oxygen. She stepped down to [facility] 10/28.     HM Course:  She underwent MBBS for evaluation of dysphagia, which showed global delayed  initiation of swallow and aspiration with thin liquids. Due to concern for possible prior stroke, head imaging was completed and negative for acute finding. She was NPO with NG tube. ENT was consulted for evaluation of dysphagia and cervical adenopathy.  Patient did not tolerate laryngoscopy; unable to visualize vocal cords but given her lack of hoarseness, they did not suspect vocal fold paresis/paralysis. MRI recommended by rheum to fully rule out any potential neurological cause of the patient's dysphagia; MRI demonstrated remote left thalamic lacunar type infarct and punctate remote left cerebellar infarcts.  She continued to work with speech therapy.  EGD completed 11/14 without abnormalities.  Per GI, Suspect some aspect of esophageal dysmotility in setting of critical illness. No further testing at this time.  Per SLP, diet advanced on 11/15 and NG tube removed.    Her other chronic medical conditions including hypothyroidism, diastolic CHF, and hypertension were managed with her home medications, with dose adjustments as needed.     Patient was noted to have downtrending Hg 11/17- required 1u pRBC. Rheum continued to follow for further steroid dosing. Patient was started on 2g NaCl tab TID for SIADH. SLP recommended mechanical soft diet with thin liquids 11/17. Na normalized 11/18. Hg improved to 8.5 following 1u pRBC. Patient had some hyperkalemia- started on scheduled lokelma. Patient continued to have improved UOP; however, BUN: Cr ratio uptrended. Nephro decided to hang off on HD as patient was having 500-600mL/24hr. He was started on scheduled NaHCO3 as ewll. Patient underwent HD 11/23. Patient had drop in Hg once again- 6.5 on 11/25. Given 1u pRBC (2nd unit). Underwent HD 11/30. Nephrology following to re-evaluate daily for need for HD and permacath placement.         This encounter was provided through telemedicine.  Patient was transferred to the telemedicine service on:  12/03/2022   The patient  location is: [id number] A admitted 10/17/2022 10:12 AM.    Interval History/Overnight Events:   Clinical record since admit reviewed.    Patient able to provide adequate history - daughter at bedside.    Uneventful night - patient feels well - minimal edema unchanged from yesterday - slept well and tolerated breakfast; patient with acute diarrhea and nausea after meds which was self-limited; she is lactose intolerant and atovaquone seems to exacerbate symptoms - no laxatives currently    Review of Systems   Constitutional:  Positive for activity change and fatigue. Negative for fever.   Respiratory:  Negative for shortness of breath.    Cardiovascular:  Positive for leg swelling.   Gastrointestinal:  Positive for diarrhea and nausea. Negative for abdominal pain and vomiting.   Musculoskeletal:  Negative for arthralgias.      Inpatient Medications:  Scheduled Meds:   sodium chloride 0.9%   Intravenous Once    amLODIPine  10 mg Per NG tube Daily    atovaquone  1,500 mg Per NG tube Daily    carvediloL  25 mg Per NG tube BID    epoetin hira (PROCRIT) injection  50 Units/kg Subcutaneous Every Mon, Wed, Fri    hydrALAZINE  100 mg Per NG tube Q8H    levothyroxine  25 mcg Per NG tube Before breakfast    pantoprazole  40 mg Oral BID AC    [START ON 12/4/2022] predniSONE  12.5 mg Oral Daily    Followed by    [START ON 12/18/2022] predniSONE  10 mg Oral Daily    Followed by    [START ON 1/1/2023] predniSONE  5 mg Oral Daily    QUEtiapine  25 mg Oral QHS    sodium bicarbonate  650 mg Oral BID    sodium chloride 0.9% flush bag IVPB   Intravenous 1 time in Clinic/HOD     Continuous Infusions:  PRN Meds:.sodium chloride, sodium chloride, sodium chloride, acetaminophen, albuterol-ipratropium, aluminum-magnesium hydroxide-simethicone, bisacodyL, cloNIDine, dextrose 10%, dextrose 10%, glucagon (human recombinant), glucose, glucose, heparin (porcine), heparin (porcine), heparin (porcine), heparin, porcine (PF), heparin,  porcine (PF), insulin aspart U-100, melatonin, methocarbamoL, metoclopramide HCl, naloxone, ondansetron, prochlorperazine, simethicone, sodium chloride 0.9%, sodium chloride 0.9%, sodium chloride 0.9%, sodium chloride 0.9%, sodium chloride 0.9%, sodium chloride 0.9%, sodium chloride 0.9%, sucralfate      Objective:     Temp:  [96.7 °F (35.9 °C)-98.6 °F (37 °C)] 98.6 °F (37 °C)  Pulse:  [66-71] 70  Resp:  [20] 20  SpO2:  [90 %-96 %] 90 %  BP: (104-144)/(53-63) 106/54      Intake/Output Summary (Last 24 hours) at 12/3/2022 0934  Last data filed at 12/3/2022 0902  Gross per 24 hour   Intake --   Output 300 ml   Net -300 ml        Body mass index is 29.41 kg/m².    Physical Exam  Vitals and nursing note reviewed.   Constitutional:       General: She is not in acute distress.     Appearance: Normal appearance.   HENT:      Head: Normocephalic and atraumatic.   Eyes:      General: No scleral icterus.        Right eye: No discharge.         Left eye: No discharge.      Extraocular Movements: Extraocular movements intact.   Cardiovascular:      Rate and Rhythm: Normal rate.   Pulmonary:      Effort: Pulmonary effort is normal. No tachypnea or respiratory distress.   Musculoskeletal:      Right lower leg: Edema present.      Left lower leg: Edema present.   Neurological:      General: No focal deficit present.      Mental Status: She is alert and oriented to person, place, and time.      Cranial Nerves: No cranial nerve deficit.      Motor: No weakness.   Psychiatric:         Mood and Affect: Mood and affect normal.         Speech: Speech normal.         Behavior: Behavior is cooperative.         Thought Content: Thought content normal.        Labs:  Recent Results (from the past 24 hour(s))   POCT glucose    Collection Time: 12/02/22 12:19 PM   Result Value Ref Range    POCT Glucose 123 (H) 70 - 110 mg/dL   Magnesium    Collection Time: 12/03/22  3:12 AM   Result Value Ref Range    Magnesium 1.9 1.6 - 2.6 mg/dL   Phosphorus     Collection Time: 12/03/22  3:12 AM   Result Value Ref Range    Phosphorus 5.4 (H) 2.7 - 4.5 mg/dL   Basic Metabolic Panel    Collection Time: 12/03/22  3:12 AM   Result Value Ref Range    Sodium 135 (L) 136 - 145 mmol/L    Potassium 4.3 3.5 - 5.1 mmol/L    Chloride 96 95 - 110 mmol/L    CO2 24 23 - 29 mmol/L    Glucose 68 (L) 70 - 110 mg/dL    BUN 71 (H) 8 - 23 mg/dL    Creatinine 8.3 (H) 0.5 - 1.4 mg/dL    Calcium 7.8 (L) 8.7 - 10.5 mg/dL    Anion Gap 15 8 - 16 mmol/L    eGFR 4.6 (A) >60 mL/min/1.73 m^2   CBC Auto Differential    Collection Time: 12/03/22  3:13 AM   Result Value Ref Range    WBC 11.67 3.90 - 12.70 K/uL    RBC 3.03 (L) 4.00 - 5.40 M/uL    Hemoglobin 8.6 (L) 12.0 - 16.0 g/dL    Hematocrit 28.0 (L) 37.0 - 48.5 %    MCV 92 82 - 98 fL    MCH 28.4 27.0 - 31.0 pg    MCHC 30.7 (L) 32.0 - 36.0 g/dL    RDW 15.1 (H) 11.5 - 14.5 %    Platelets 135 (L) 150 - 450 K/uL    MPV 12.2 9.2 - 12.9 fL    Immature Granulocytes 1.4 (H) 0.0 - 0.5 %    Gran # (ANC) 9.0 (H) 1.8 - 7.7 K/uL    Immature Grans (Abs) 0.16 (H) 0.00 - 0.04 K/uL    Lymph # 1.8 1.0 - 4.8 K/uL    Mono # 0.7 0.3 - 1.0 K/uL    Eos # 0.0 0.0 - 0.5 K/uL    Baso # 0.02 0.00 - 0.20 K/uL    nRBC 0 0 /100 WBC    Gran % 77.1 (H) 38.0 - 73.0 %    Lymph % 15.0 (L) 18.0 - 48.0 %    Mono % 6.2 4.0 - 15.0 %    Eosinophil % 0.1 0.0 - 8.0 %    Basophil % 0.2 0.0 - 1.9 %    Differential Method Automated    POCT glucose    Collection Time: 12/03/22  7:33 AM   Result Value Ref Range    POCT Glucose 80 70 - 110 mg/dL        Lab Results   Component Value Date    [id number] Not Detected 10/24/2022       Recent Labs   Lab 12/01/22 0344 12/02/22 0423 12/03/22 0313   WBC 11.59 12.24 11.67   LYMPH 15.1*  1.8 15.6*  1.9 15.0*  1.8   HGB 8.5* 8.7* 8.6*   HCT 26.5* 27.8* 28.0*   * 127* 135*     Recent Labs   Lab 12/01/22 0344 12/02/22 0423 12/03/22 0312    137 135*   K 3.5 4.3 4.3   CL 99 98 96   CO2 25 24 24   BUN 57* 62* 71*   CREATININE 7.4* 8.1*  8.3*   GLU 67* 71 68*   CALCIUM 7.8* 7.7* 7.8*   MG 2.0 1.9 1.9   PHOS 5.4* 5.0* 5.4*     Recent Labs   Lab 11/26/22  1408   ALBUMIN 2.5*        No results for input(s): DDIMER, FERRITIN, CRP, LDH, BNP, TROPONINI, CPK in the last 72 hours.    Invalid input(s): PROCALCITONIN    All labs within the last 24 hours were reviewed.     Microbiology:  Microbiology Results (last 7 days)       ** No results found for the last 168 hours. **              Imaging  ECG Results              EKG 12-lead (Final result)  Result time 10/17/22 14:26:15      Final result by Interface, Lab In [facility] (10/17/22 14:26:15)                   Narrative:    Test Reason : R06.02,    Vent. Rate : 093 BPM     Atrial Rate : 093 BPM     P-R Int : 126 ms          QRS Dur : 070 ms      QT Int : 356 ms       P-R-T Axes : 048 052 030 degrees     QTc Int : 442 ms    Normal sinus rhythm  Normal ECG  When compared with ECG of 14-OCT-2022 14:26,  Limb lead reversal has been corrected  Confirmed by Jc Barbosa MD ([id number]) on 10/17/2022 2:26:04 PM    Referred By: [email or web address]   SELF           Confirmed By:Jc Barbosa MD                                    Results for orders placed during the hospital encounter of 10/17/22    Echo    Interpretation Summary  · The left ventricle is normal in size with normal systolic function. The estimated ejection fraction is 65%.  · Normal right ventricular size with normal right ventricular systolic function.  · Grade I left ventricular diastolic dysfunction.  · Mild tricuspid regurgitation.  · There is pulmonary hypertension. The estimated PA systolic pressure is 56 mmHg.  · Normal to low central venous pressure (3 mmHg).      X-Ray Abdomen AP 1 View  Narrative: EXAMINATION:  XR ABDOMEN AP 1 VIEW    CLINICAL HISTORY:  NGT placement; Dysphagia, unspecified    TECHNIQUE:  AP View(s) of the abdomen was performed.    COMPARISON:  No 11/20/2022 ne    FINDINGS:  Feeding tube in the stomach.  No significant bowel  dilatation.  Impression: See above    Electronically signed by: Ej Strickland MD  Date:    11/14/2022  Time:    11:02      All imaging within the last 24 hours was reviewed.       Discharge Planning   ANTHONY: 12/8/2022     Code Status: Full Code   Is the patient medically ready for discharge?: No    Reason for patient still in hospital (select all that apply): Patient trending condition, Laboratory test, Treatment, Consult recommendations, and Pending disposition  Discharge Plan A: Skilled Nursing Facility   Discharge Delays: None known at this time          Assessment/Plan:      * Microscopic polyangiitis  As of 10/26/22 strongly positive MPO Ab (in contrast to borderline positive PR3), consistent with clinical picture of microscopic polyangiitis with pulmonary, and renal involvement after presenting with acute hypoxemic respiratory failure, hemoptysis, and acute renal failure.  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   3)  CHANGES SUGGESTIVE OF FOCAL ACUTE PYELONEPHRITIS.   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 Now following PEXIVAS steroid taper. Currently on oral steroid taper   - plan for 20mg IV  daily on 11/6 for 14 days (last dose of 20mg equivalent is 11/20/2022); completing oral Prednisone taper  - apheresis: underwent PLEX 10/26, 10/27, 10/30, 11/1, 11/2, 11/3  - rituximab every 7 days for 4 doses: Dose #1: 10/27, #2 11/3, #3 11/10, and #4 given 11/17  - cyclophosphamide every 14 days for 2 doses: 10/27, 11/10  - Opportunistic Infection ppx: atovaquone 1500mg PO daily  - GI bleed prophylaxis: pantoprazole 40mg daily     Anemia due to chronic kidney disease  - 2/2 CKD  - Hg stable  - Monitor trend     Primary pauci-immune necrotizing and crescentic glomerulonephritis  Patient with acute kidney injury likely due to microscopic polyangiitis WILFREDO is currently stable. Labs reviewed- Renal function/electrolytes with Estimated Creatinine Clearance: 5.3 mL/min (A) (based on SCr of 8.3 mg/dL (H)). according to latest data. Monitor urine output and serial BMP and adjust therapy as needed. Avoid nephrotoxins and renally dose meds for GFR listed above.   Nephrology following and patient receives HD as indicated.       Gastric reflux  - PPI BID      High risk medications (not anticoagulants) long-term use  See history of vasculitis therapy      Anuria  - Improving urine output  - Nephrology following- re-evaluating daily for need for Intermittent HD and permacath placement     Gastrointestinal hemorrhage with melena  Starting having hemoptysis and melena with associated acute blood loss anemia earlier in hospital stay. Patient with known internal hemorrhoids, mild sigmoid diverticulosis, history of gastritis and + H pylori (2012 EGD).   - GI consulted 10/19, unable to perform EGD due to instability and respiratory failure at the time    Plan  - PPI PO BID   - Monitor CBC closely for signs of recurrent bleed  - EGD w/no acute bleed seen  - Transfused 2u pRBC    Dysphagia  SLP following  MBSS showing global weakness in swallowing as well as aspiration with thin liquids.   ENT consulted but patient did not tolerate  laryngoscopy     Plan   - Dysphagia possibly 2/2 previous stroke  - Briefly required NGT, worked SLP  - NGT removed- being treated with nystatin swish and swallow for thrush  - GI consulted as well for concern of oropharyngeal candidiasis--> Low suspicion for esophageal candidiasis without odynophagia however can have if white plaques have been seen on oropharynx, ok to start fluconazole empirically for possible esophageal candidiasis  - EGD on 11/14 without abnormality; per GI, Suspect some aspect of esophageal dysmotility in setting of critical illness. No further testing at this time.  - Advanced to renal diet 11/22    Moderate malnutrition  Nutrition consulted. Most recent weight and BMI monitored-     Malnutrition (Moderate to Severe)  Weight Loss (Malnutrition): 7.5% in 3 months    Measurements:  Wt Readings from Last 1 Encounters:   12/03/22 70.6 kg (155 lb 10.3 oz)   Body mass index is 29.41 kg/m².    Recommendations: Recommendation/Intervention: 1.  Goals: Meet % EEN, EPN by RD f/u date    Patient has been screened and assessed by RD. RD will follow patient.      Acute renal failure on dialysis  Due to microscopic polyangiitis. Remains on hemodialysis intermittently.   - Baseline creatinine 1.0-1.2.   - New onset proteinuria/hematuria.   - Renal biopsy completed and   - Trialysis in place for intermittent Hemodialysis    Plan  - Nephrology consulted, appreciate management  - management of MPA as noted separately  - Renal function panel daily  - renally dose all medications  - intermittent Hemodialysis as indicated, per Nephrology--> still evaluating daily for HD need and permacath placement- if needing permacath placement, then will need to be done prior to dc and outpatient HD chair arranged.      Recent Labs   Lab 12/01/22  0344 12/02/22  0423 12/03/22  0312   BUN 57* 62* 71*   CREATININE 7.4* 8.1* 8.3*       Acute hypoxemic respiratory failure  Multifocal pneumonia  Hemoptysis  Dyspnea  Diffuse  Alveolar Hemorrahge    In the setting of a new diagnosis of microscopic polyangiitis, presented with fevers, dyspnea,.  - Chest imaging was consistent with diffuse alveolar hemorrhage.  CT chest wc 10/17 with JESSICA perihilar dense consolidations w/ peripheral patcy areas of GGO, JESSICA PNA, less c/f cardiogenic pulmonary edema.  - Patient upgraded to Medical ICU 10/23/22 with abrupt respiratory decline requiring NIPPV, improved with high dose IV steroids, plasmapheresis, emergent hemodialysis.   - Unable to perform bronchoscopy in the Medical ICU due to tenuous respiratory status  Hypoxia has resolved    Plan:  - weaned to room air  - continue management of underlying triggers with steroid and immunosuppressants  - incentive spirometry and respiratory hygiene    Lymphadenopathy of head and neck  - Has bilateral neck gland swelling with tenderness; consulted ENT  - No surgical intervention indicated   - Adenopathy likely reactive in nature   - Recommend further workup if it does not resolve within next 2 weeks   - 11/30-- Adenopathy is still present especially right submandibular region    Hyponatremia  - thought to be 2/2 SIADH initially- received NaCl tabs  - NaCl tabs removed  - Na stable on HD      Other specified anemias  In the setting of GI bleed with melena  - Evaluated by GI, see GI bleed documentation  - Last transfusion 10/28, Hgb slowly trending down since then  - Hgb 6.9 on 11/5    Plan  - Monitor CBC Daily   - Treat with pRBC transfusion PRN Hgb <7  - Transfused 3u pRBC    Current CBC reviewed-    Recent Labs   Lab 12/01/22  0344 12/02/22  0423 12/03/22  0313   HGB 8.5* 8.7* 8.6*       Chronic diastolic heart failure  Pulmonary Hypertension due to left heart disease  TTE (10/2022) EF 65%, G1DD  Home meds: Lisinopril, Toprol    Plan  - Active volume control with intermittent Hemodialysis per Nephrology   - Daily weights (standing if tolerated)  - Strict I/Os  - Fluid restriction 1.5 day  - Cardiac diet w/ 2 g  Na restriction    Hyperkalemia  - resolved    Primary hypertension  BP Readings from Last 1 Encounters:   12/03/22 (!) 111/56     - Patient was unable to tolerate PO mediations, all meds to be administered via NG tube until removed on 11/15.  - Will continue to monitor blood pressure trend closely and adjust antihypertensive regimen as clinically indicated and tolerated.    amLODIPine tablet 10mg, Per PO, Daily    carvediloL tablet 25mg Per PO, BID    hydrALAZINE tablet 100mg Per PO, Q8H          Hypothyroid  - Continue synthroid 25 mcg  - TSH 0.585 10/27/22      VTE Risk Mitigation (From admission, onward)         Ordered     heparin (porcine) injection 1,000 Units  As needed (PRN)         11/29/22 0857     heparin (porcine) injection 1,000 Units  As needed (PRN)         11/22/22 0832     heparin, porcine (PF) 100 unit/mL injection flush 500 Units  As needed (PRN)         11/17/22 1343     heparin, porcine (PF) 100 unit/mL injection flush 500 Units  As needed (PRN)         11/10/22 1430     heparin (porcine) injection 1,000 Units  As needed (PRN)         11/09/22 1601     heparin (porcine) injection 1,000 Units  As needed (PRN)         11/08/22 0854     Place sequential compression device  Until discontinued         10/25/22 1731     IP VTE HIGH RISK PATIENT  Once         10/17/22 1354     Reason for No Pharmacological VTE Prophylaxis  Once        Question:  Reasons:  Answer:  Risk of Bleeding    10/17/22 1354                High Risk Conditions:  Patient has a condition that poses threat to life and bodily function: Acute Renal Failure      I have completed this tele-visit with the assistance of a telepresenter.    The attending portion of this evaluation, treatment, and documentation was performed per Haleigh Parks MD via Telemedicine AudioVisual using the secure [employer] software platform with 2 way audio/video. The provider was located off-site and the patient is located in the hospital. The aforementioned  video software was utilized to document the relevant history and physical exam    Haleigh Parks MD  Department of Hospital Medicine   [facility] - Int[facility] ([place])

## 2022-12-03 NOTE — ASSESSMENT & PLAN NOTE
- Improving urine output  - Nephrology following- re-evaluating daily for need for Intermittent HD and permacath placement

## 2022-12-03 NOTE — NURSING
Rec'd back to room via WC from Dialysis. Patient c/o coldness, assisted patient with pulling blankets up for comfort.

## 2022-12-03 NOTE — CONSULTS
Thank you for your consult to [facility]. We have reviewed the patient chart. This patient does meet criteria for [facility] service at this time. Will assume care on 12/03/22 at 7AM.    Haleigh Parks MD

## 2022-12-03 NOTE — PROGRESS NOTES
HD treatment started. No complications with access to the left IJ catheter. Lines secured and telemetry in place. No complaints of discomfort at this time.

## 2022-12-03 NOTE — ASSESSMENT & PLAN NOTE
Nutrition consulted. Most recent weight and BMI monitored-     Malnutrition (Moderate to Severe)  Weight Loss (Malnutrition): 7.5% in 3 months    Measurements:  Wt Readings from Last 1 Encounters:   12/03/22 70.6 kg (155 lb 10.3 oz)   Body mass index is 29.41 kg/m².    Recommendations: Recommendation/Intervention: 1.  Goals: Meet % EEN, EPN by RD f/u date    Patient has been screened and assessed by RD. RD will follow patient.

## 2022-12-03 NOTE — ASSESSMENT & PLAN NOTE
BP Readings from Last 1 Encounters:   12/03/22 (!) 111/56     - Patient was unable to tolerate PO mediations, all meds to be administered via NG tube until removed on 11/15.  - Will continue to monitor blood pressure trend closely and adjust antihypertensive regimen as clinically indicated and tolerated.    amLODIPine tablet 10mg, Per PO, Daily    carvediloL tablet 25mg Per PO, BID    hydrALAZINE tablet 100mg Per PO, Q8H

## 2022-12-03 NOTE — ASSESSMENT & PLAN NOTE
Multifocal pneumonia  Hemoptysis  Dyspnea  Diffuse Alveolar Hemorrahge    In the setting of a new diagnosis of microscopic polyangiitis, presented with fevers, dyspnea,.  - Chest imaging was consistent with diffuse alveolar hemorrhage.  CT chest wc 10/17 with JESSICA perihilar dense consolidations w/ peripheral patcy areas of GGO, JESSICA PNA, less c/f cardiogenic pulmonary edema.  - Patient upgraded to Medical ICU 10/23/22 with abrupt respiratory decline requiring NIPPV, improved with high dose IV steroids, plasmapheresis, emergent hemodialysis.   - Unable to perform bronchoscopy in the Medical ICU due to tenuous respiratory status  Hypoxia has resolved    Plan:  - weaned to room air  - continue management of underlying triggers with steroid and immunosuppressants  - incentive spirometry and respiratory hygiene

## 2022-12-03 NOTE — ASSESSMENT & PLAN NOTE
As of 10/26/22 strongly positive MPO Ab (in contrast to borderline positive PR3), consistent with clinical picture of microscopic polyangiitis with pulmonary, and renal involvement after presenting with acute hypoxemic respiratory failure, hemoptysis, and acute renal failure.  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   3)  CHANGES SUGGESTIVE OF FOCAL ACUTE PYELONEPHRITIS.   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 Now following PEXIVAS steroid taper. Currently on oral steroid taper   - plan for 20mg IV daily on 11/6 for 14 days (last dose of 20mg equivalent is 11/20/2022); completing oral Prednisone taper  - apheresis: underwent PLEX 10/26, 10/27, 10/30, 11/1, 11/2, 11/3  - rituximab every 7 days for 4 doses: Dose #1: 10/27, #2 11/3, #3 11/10, and #4 given 11/17  - cyclophosphamide every 14 days for 2 doses: 10/27, 11/10  - Opportunistic Infection ppx: atovaquone 1500mg PO daily  - GI bleed prophylaxis: pantoprazole 40mg daily

## 2022-12-04 LAB
ANION GAP SERPL CALC-SCNC: 14 MMOL/L (ref 8–16)
BASOPHILS # BLD AUTO: 0.01 K/UL (ref 0–0.2)
BASOPHILS NFR BLD: 0.1 % (ref 0–1.9)
BUN SERPL-MCNC: 38 MG/DL (ref 8–23)
CALCIUM SERPL-MCNC: 7.4 MG/DL (ref 8.7–10.5)
CHLORIDE SERPL-SCNC: 104 MMOL/L (ref 95–110)
CO2 SERPL-SCNC: 22 MMOL/L (ref 23–29)
CREAT SERPL-MCNC: 5.8 MG/DL (ref 0.5–1.4)
DIFFERENTIAL METHOD: ABNORMAL
EOSINOPHIL # BLD AUTO: 0 K/UL (ref 0–0.5)
EOSINOPHIL NFR BLD: 0.1 % (ref 0–8)
ERYTHROCYTE [DISTWIDTH] IN BLOOD BY AUTOMATED COUNT: 15 % (ref 11.5–14.5)
EST. GFR  (NO RACE VARIABLE): 7.1 ML/MIN/1.73 M^2
GLUCOSE SERPL-MCNC: 55 MG/DL (ref 70–110)
HCT VFR BLD AUTO: 26.3 % (ref 37–48.5)
HGB BLD-MCNC: 8.5 G/DL (ref 12–16)
IMM GRANULOCYTES # BLD AUTO: 0.1 K/UL (ref 0–0.04)
IMM GRANULOCYTES NFR BLD AUTO: 1 % (ref 0–0.5)
LYMPHOCYTES # BLD AUTO: 2 K/UL (ref 1–4.8)
LYMPHOCYTES NFR BLD: 19.2 % (ref 18–48)
MAGNESIUM SERPL-MCNC: 1.9 MG/DL (ref 1.6–2.6)
MCH RBC QN AUTO: 29.2 PG (ref 27–31)
MCHC RBC AUTO-ENTMCNC: 32.3 G/DL (ref 32–36)
MCV RBC AUTO: 90 FL (ref 82–98)
MONOCYTES # BLD AUTO: 0.7 K/UL (ref 0.3–1)
MONOCYTES NFR BLD: 6.7 % (ref 4–15)
NEUTROPHILS # BLD AUTO: 7.5 K/UL (ref 1.8–7.7)
NEUTROPHILS NFR BLD: 72.9 % (ref 38–73)
NRBC BLD-RTO: 0 /100 WBC
PHOSPHATE SERPL-MCNC: 4.5 MG/DL (ref 2.7–4.5)
PLATELET # BLD AUTO: 98 K/UL (ref 150–450)
PMV BLD AUTO: 12.4 FL (ref 9.2–12.9)
POCT GLUCOSE: 61 MG/DL (ref 70–110)
POCT GLUCOSE: 79 MG/DL (ref 70–110)
POCT GLUCOSE: 92 MG/DL (ref 70–110)
POCT GLUCOSE: 93 MG/DL (ref 70–110)
POTASSIUM SERPL-SCNC: 4.4 MMOL/L (ref 3.5–5.1)
RBC # BLD AUTO: 2.91 M/UL (ref 4–5.4)
SODIUM SERPL-SCNC: 140 MMOL/L (ref 136–145)
WBC # BLD AUTO: 10.27 K/UL (ref 3.9–12.7)

## 2022-12-04 PROCEDURE — 36415 COLL VENOUS BLD VENIPUNCTURE: CPT

## 2022-12-04 PROCEDURE — 99233 SBSQ HOSP IP/OBS HIGH 50: CPT | Mod: 95,,, | Performed by: INTERNAL MEDICINE

## 2022-12-04 PROCEDURE — 99233 PR SUBSEQUENT HOSPITAL CARE,LEVL III: ICD-10-PCS | Mod: 95,,, | Performed by: INTERNAL MEDICINE

## 2022-12-04 PROCEDURE — 25000003 PHARM REV CODE 250: Performed by: INTERNAL MEDICINE

## 2022-12-04 PROCEDURE — 85025 COMPLETE CBC W/AUTO DIFF WBC: CPT | Performed by: STUDENT IN AN ORGANIZED HEALTH CARE EDUCATION/TRAINING PROGRAM

## 2022-12-04 PROCEDURE — 83735 ASSAY OF MAGNESIUM: CPT

## 2022-12-04 PROCEDURE — 20600001 HC STEP DOWN PRIVATE ROOM

## 2022-12-04 PROCEDURE — 63600175 PHARM REV CODE 636 W HCPCS: Performed by: STUDENT IN AN ORGANIZED HEALTH CARE EDUCATION/TRAINING PROGRAM

## 2022-12-04 PROCEDURE — 25000003 PHARM REV CODE 250: Performed by: HOSPITALIST

## 2022-12-04 PROCEDURE — 25000003 PHARM REV CODE 250

## 2022-12-04 PROCEDURE — 80048 BASIC METABOLIC PNL TOTAL CA: CPT | Performed by: STUDENT IN AN ORGANIZED HEALTH CARE EDUCATION/TRAINING PROGRAM

## 2022-12-04 PROCEDURE — 25000003 PHARM REV CODE 250: Performed by: STUDENT IN AN ORGANIZED HEALTH CARE EDUCATION/TRAINING PROGRAM

## 2022-12-04 PROCEDURE — 84100 ASSAY OF PHOSPHORUS: CPT

## 2022-12-04 RX ORDER — PREDNISONE 5 MG/1
5 TABLET ORAL DAILY
Status: DISCONTINUED | OUTPATIENT
Start: 2023-01-15 | End: 2022-12-13 | Stop reason: HOSPADM

## 2022-12-04 RX ORDER — PREDNISONE 5 MG/1
15 TABLET ORAL DAILY
Status: DISCONTINUED | OUTPATIENT
Start: 2022-12-05 | End: 2022-12-04

## 2022-12-04 RX ORDER — HYDRALAZINE HYDROCHLORIDE 50 MG/1
50 TABLET, FILM COATED ORAL EVERY 8 HOURS
Status: DISCONTINUED | OUTPATIENT
Start: 2022-12-04 | End: 2022-12-13 | Stop reason: HOSPADM

## 2022-12-04 RX ORDER — CARVEDILOL 12.5 MG/1
12.5 TABLET ORAL 2 TIMES DAILY
Status: DISCONTINUED | OUTPATIENT
Start: 2022-12-04 | End: 2022-12-10

## 2022-12-04 RX ORDER — PREDNISONE 5 MG/1
15 TABLET ORAL
Status: DISCONTINUED | OUTPATIENT
Start: 2022-12-05 | End: 2022-12-13 | Stop reason: HOSPADM

## 2022-12-04 RX ORDER — PREDNISONE 5 MG/1
10 TABLET ORAL
Status: DISCONTINUED | OUTPATIENT
Start: 2023-01-01 | End: 2022-12-13 | Stop reason: HOSPADM

## 2022-12-04 RX ORDER — PREDNISONE 5 MG/1
10 TABLET ORAL DAILY
Status: DISCONTINUED | OUTPATIENT
Start: 2023-01-01 | End: 2022-12-04

## 2022-12-04 RX ORDER — AMLODIPINE BESYLATE 5 MG/1
5 TABLET ORAL DAILY
Status: DISCONTINUED | OUTPATIENT
Start: 2022-12-05 | End: 2022-12-13 | Stop reason: HOSPADM

## 2022-12-04 RX ADMIN — QUETIAPINE FUMARATE 12.5 MG: 25 TABLET ORAL at 08:12

## 2022-12-04 RX ADMIN — HYDRALAZINE HYDROCHLORIDE 50 MG: 50 TABLET ORAL at 10:12

## 2022-12-04 RX ADMIN — CARVEDILOL 12.5 MG: 12.5 TABLET, FILM COATED ORAL at 08:12

## 2022-12-04 RX ADMIN — PREDNISONE 12.5 MG: 2.5 TABLET ORAL at 09:12

## 2022-12-04 RX ADMIN — HYDRALAZINE HYDROCHLORIDE 100 MG: 50 TABLET ORAL at 06:12

## 2022-12-04 RX ADMIN — PANTOPRAZOLE SODIUM 40 MG: 40 TABLET, DELAYED RELEASE ORAL at 04:12

## 2022-12-04 RX ADMIN — Medication 6 MG: at 08:12

## 2022-12-04 RX ADMIN — PANTOPRAZOLE SODIUM 40 MG: 40 TABLET, DELAYED RELEASE ORAL at 06:12

## 2022-12-04 RX ADMIN — CARVEDILOL 25 MG: 25 TABLET, FILM COATED ORAL at 09:12

## 2022-12-04 RX ADMIN — SODIUM BICARBONATE 650 MG TABLET 650 MG: at 09:12

## 2022-12-04 RX ADMIN — LEVOTHYROXINE SODIUM 25 MCG: 25 TABLET ORAL at 06:12

## 2022-12-04 RX ADMIN — ACETAMINOPHEN 650 MG: 325 TABLET ORAL at 04:12

## 2022-12-04 NOTE — NURSING
"Patient resting quietly in bed, appears asleep. Awakens easily to verbal stimuli. States "I am very tired today, dialysis really wore me out." Declines PT this morning, states is too tired.  "

## 2022-12-04 NOTE — SUBJECTIVE & OBJECTIVE
This encounter was provided through telemedicine.  Patient was transferred to the telemedicine service on:  12/03/2022   The patient location is: [id number] A admitted 10/17/2022 10:12 AM.    Interval History/Overnight Events:     Patient able to provide adequate history - daughter at bedside.    Felt dizzy this am with several BP meds held - SBP in 130's; feeling very weak this am and she thinks it is due to dialysis with fluid removal; she also feels she is worse when she takes medications    Labs reviewed - plt 98      Review of Systems   Constitutional:  Positive for activity change, appetite change and fatigue. Negative for fever.   Respiratory:  Negative for shortness of breath.    Cardiovascular:  Positive for leg swelling.   Gastrointestinal:  Negative for abdominal pain, diarrhea, nausea and vomiting.   Musculoskeletal:  Negative for arthralgias.      Inpatient Medications:  Scheduled Meds:   sodium chloride 0.9%   Intravenous Once    amLODIPine  10 mg Oral Daily    [START ON 12/6/2022] atovaquone  1,500 mg Oral Daily    carvediloL  25 mg Oral BID    epoetin hira (PROCRIT) injection  50 Units/kg Subcutaneous Every Mon, Wed, Fri    hydrALAZINE  100 mg Oral Q8H    levothyroxine  25 mcg Oral Before breakfast    pantoprazole  40 mg Oral BID AC    predniSONE  12.5 mg Oral Daily    Followed by    [START ON 12/18/2022] predniSONE  10 mg Oral Daily    Followed by    [START ON 1/1/2023] predniSONE  5 mg Oral Daily    QUEtiapine  25 mg Oral QHS    sodium bicarbonate  650 mg Oral BID    sodium chloride 0.9% flush bag IVPB   Intravenous 1 time in Clinic/HOD     Continuous Infusions:  PRN Meds:.acetaminophen, albuterol-ipratropium, aluminum-magnesium hydroxide-simethicone, bisacodyL, cloNIDine, dextrose 10%, dextrose 10%, glucagon (human recombinant), glucose, glucose, heparin (porcine), heparin (porcine), heparin (porcine), heparin, porcine (PF), heparin, porcine (PF), insulin aspart U-100, melatonin, methocarbamoL,  metoclopramide HCl, naloxone, ondansetron, prochlorperazine, simethicone, sodium chloride 0.9%, sodium chloride 0.9%, sodium chloride 0.9%, sodium chloride 0.9%, sodium chloride 0.9%, sodium chloride 0.9%, sucralfate      Objective:     Temp:  [97.4 °F (36.3 °C)-98.9 °F (37.2 °C)] 98.8 °F (37.1 °C)  Pulse:  [50-71] 71  Resp:  [17-18] 17  SpO2:  [93 %-96 %] 93 %  BP: (104-146)/(52-67) 107/52      Intake/Output Summary (Last 24 hours) at 12/4/2022 1046  Last data filed at 12/3/2022 1514  Gross per 24 hour   Intake 600 ml   Output 1100 ml   Net -500 ml          Body mass index is 28.62 kg/m².    Physical Exam  Vitals and nursing note reviewed.   Constitutional:       General: She is not in acute distress.     Appearance: Normal appearance.   HENT:      Head: Normocephalic and atraumatic.   Eyes:      General: No scleral icterus.        Right eye: No discharge.         Left eye: No discharge.      Extraocular Movements: Extraocular movements intact.   Cardiovascular:      Rate and Rhythm: Normal rate.   Pulmonary:      Effort: Pulmonary effort is normal. No tachypnea or respiratory distress.   Musculoskeletal:      Right lower leg: Edema present.      Left lower leg: Edema present.   Neurological:      General: No focal deficit present.      Mental Status: She is alert and oriented to person, place, and time.      Cranial Nerves: No cranial nerve deficit.      Motor: No weakness.   Psychiatric:         Mood and Affect: Mood and affect normal.         Speech: Speech normal.         Behavior: Behavior is cooperative.         Thought Content: Thought content normal.        Labs:  Recent Results (from the past 24 hour(s))   POCT glucose    Collection Time: 12/03/22  3:09 PM   Result Value Ref Range    POCT Glucose 118 (H) 70 - 110 mg/dL   POCT glucose    Collection Time: 12/03/22  4:17 PM   Result Value Ref Range    POCT Glucose 130 (H) 70 - 110 mg/dL   POCT glucose    Collection Time: 12/03/22  9:36 PM   Result Value Ref  Range    POCT Glucose 113 (H) 70 - 110 mg/dL   Magnesium    Collection Time: 12/04/22  2:43 AM   Result Value Ref Range    Magnesium 1.9 1.6 - 2.6 mg/dL   Phosphorus    Collection Time: 12/04/22  2:43 AM   Result Value Ref Range    Phosphorus 4.5 2.7 - 4.5 mg/dL   CBC Auto Differential    Collection Time: 12/04/22  2:43 AM   Result Value Ref Range    WBC 10.27 3.90 - 12.70 K/uL    RBC 2.91 (L) 4.00 - 5.40 M/uL    Hemoglobin 8.5 (L) 12.0 - 16.0 g/dL    Hematocrit 26.3 (L) 37.0 - 48.5 %    MCV 90 82 - 98 fL    MCH 29.2 27.0 - 31.0 pg    MCHC 32.3 32.0 - 36.0 g/dL    RDW 15.0 (H) 11.5 - 14.5 %    Platelets 98 (L) 150 - 450 K/uL    MPV 12.4 9.2 - 12.9 fL    Immature Granulocytes 1.0 (H) 0.0 - 0.5 %    Gran # (ANC) 7.5 1.8 - 7.7 K/uL    Immature Grans (Abs) 0.10 (H) 0.00 - 0.04 K/uL    Lymph # 2.0 1.0 - 4.8 K/uL    Mono # 0.7 0.3 - 1.0 K/uL    Eos # 0.0 0.0 - 0.5 K/uL    Baso # 0.01 0.00 - 0.20 K/uL    nRBC 0 0 /100 WBC    Gran % 72.9 38.0 - 73.0 %    Lymph % 19.2 18.0 - 48.0 %    Mono % 6.7 4.0 - 15.0 %    Eosinophil % 0.1 0.0 - 8.0 %    Basophil % 0.1 0.0 - 1.9 %    Differential Method Automated    Basic Metabolic Panel    Collection Time: 12/04/22  2:43 AM   Result Value Ref Range    Sodium 140 136 - 145 mmol/L    Potassium 4.4 3.5 - 5.1 mmol/L    Chloride 104 95 - 110 mmol/L    CO2 22 (L) 23 - 29 mmol/L    Glucose 55 (L) 70 - 110 mg/dL    BUN 38 (H) 8 - 23 mg/dL    Creatinine 5.8 (H) 0.5 - 1.4 mg/dL    Calcium 7.4 (L) 8.7 - 10.5 mg/dL    Anion Gap 14 8 - 16 mmol/L    eGFR 7.1 (A) >60 mL/min/1.73 m^2   POCT glucose    Collection Time: 12/04/22  7:55 AM   Result Value Ref Range    POCT Glucose 61 (L) 70 - 110 mg/dL        Lab Results   Component Value Date    [id number] Not Detected 10/24/2022       Recent Labs   Lab 12/02/22  0423 12/03/22  0313 12/04/22  0243   WBC 12.24 11.67 10.27   LYMPH 15.6*  1.9 15.0*  1.8 19.2  2.0   HGB 8.7* 8.6* 8.5*   HCT 27.8* 28.0* 26.3*   * 135* 98*       Recent Labs   Lab  12/02/22  0423 12/03/22  0312 12/04/22  0243    135* 140   K 4.3 4.3 4.4   CL 98 96 104   CO2 24 24 22*   BUN 62* 71* 38*   CREATININE 8.1* 8.3* 5.8*   GLU 71 68* 55*   CALCIUM 7.7* 7.8* 7.4*   MG 1.9 1.9 1.9   PHOS 5.0* 5.4* 4.5       No results for input(s): ALKPHOS, ALT, AST, ALBUMIN, PROT, BILITOT, INR in the last 168 hours.       No results for input(s): DDIMER, FERRITIN, CRP, LDH, BNP, TROPONINI, CPK in the last 72 hours.    Invalid input(s): PROCALCITONIN    All labs within the last 24 hours were reviewed.     Microbiology:  Microbiology Results (last 7 days)       ** No results found for the last 168 hours. **              Imaging  ECG Results              EKG 12-lead (Final result)  Result time 10/17/22 14:26:15      Final result by Interface, Lab In [facility] (10/17/22 14:26:15)                   Narrative:    Test Reason : R06.02,    Vent. Rate : 093 BPM     Atrial Rate : 093 BPM     P-R Int : 126 ms          QRS Dur : 070 ms      QT Int : 356 ms       P-R-T Axes : 048 052 030 degrees     QTc Int : 442 ms    Normal sinus rhythm  Normal ECG  When compared with ECG of 14-OCT-2022 14:26,  Limb lead reversal has been corrected  Confirmed by Jc Barbosa MD ([id number]) on 10/17/2022 2:26:04 PM    Referred By: [email or web address]   SELF           Confirmed By:Jc Barbosa MD                                    Results for orders placed during the hospital encounter of 10/17/22    Echo    Interpretation Summary  · The left ventricle is normal in size with normal systolic function. The estimated ejection fraction is 65%.  · Normal right ventricular size with normal right ventricular systolic function.  · Grade I left ventricular diastolic dysfunction.  · Mild tricuspid regurgitation.  · There is pulmonary hypertension. The estimated PA systolic pressure is 56 mmHg.  · Normal to low central venous pressure (3 mmHg).      X-Ray Abdomen AP 1 View  Narrative: EXAMINATION:  XR ABDOMEN AP 1 VIEW    CLINICAL HISTORY:  NGT  placement; Dysphagia, unspecified    TECHNIQUE:  AP View(s) of the abdomen was performed.    COMPARISON:  No 11/20/2022 ne    FINDINGS:  Feeding tube in the stomach.  No significant bowel dilatation.  Impression: See above    Electronically signed by: Ej Strickland MD  Date:    11/14/2022  Time:    11:02      All imaging within the last 24 hours was reviewed.       Discharge Planning   ANTHONY: 12/8/2022     Code Status: Full Code   Is the patient medically ready for discharge?: No    Reason for patient still in hospital (select all that apply): Patient trending condition, Laboratory test, Treatment, Consult recommendations, and Pending disposition  Discharge Plan A: Skilled Nursing Facility   Discharge Delays: None known at this time

## 2022-12-04 NOTE — NURSING
Daughter has left for the day, update given to her regarding patient's medications and plan of care. Call back number for updates given to patient's daughter.

## 2022-12-04 NOTE — PT/OT/SLP PROGRESS
"Physical Therapy      Patient Name:  Kristin Goodman   MRN:  [id number]    Patient not seen today secondary to pt declining therapy stating "I'm tired please let me rest". Will follow-up next PT schedule.    "

## 2022-12-04 NOTE — NURSING
Daughter at bedside. Updated on patient's morning. Dr Parks ([facility]) seeing patient via telemedicine platform.

## 2022-12-04 NOTE — NURSING
Adm Tylenol 650 mg PO per c/o low-grade temp of 99.7 (axillary). Patient states continued desire to rest. No other distress noted or voiced.

## 2022-12-05 LAB
ANION GAP SERPL CALC-SCNC: 14 MMOL/L (ref 8–16)
BASOPHILS # BLD AUTO: 0.02 K/UL (ref 0–0.2)
BASOPHILS NFR BLD: 0.2 % (ref 0–1.9)
BUN SERPL-MCNC: 45 MG/DL (ref 8–23)
CALCIUM SERPL-MCNC: 7.7 MG/DL (ref 8.7–10.5)
CHLORIDE SERPL-SCNC: 104 MMOL/L (ref 95–110)
CO2 SERPL-SCNC: 22 MMOL/L (ref 23–29)
CREAT SERPL-MCNC: 6.4 MG/DL (ref 0.5–1.4)
DIFFERENTIAL METHOD: ABNORMAL
EOSINOPHIL # BLD AUTO: 0 K/UL (ref 0–0.5)
EOSINOPHIL NFR BLD: 0 % (ref 0–8)
ERYTHROCYTE [DISTWIDTH] IN BLOOD BY AUTOMATED COUNT: 15 % (ref 11.5–14.5)
EST. GFR  (NO RACE VARIABLE): 6.3 ML/MIN/1.73 M^2
GLUCOSE SERPL-MCNC: 63 MG/DL (ref 70–110)
HCT VFR BLD AUTO: 26.9 % (ref 37–48.5)
HGB BLD-MCNC: 8.3 G/DL (ref 12–16)
IMM GRANULOCYTES # BLD AUTO: 0.11 K/UL (ref 0–0.04)
IMM GRANULOCYTES NFR BLD AUTO: 1.2 % (ref 0–0.5)
LYMPHOCYTES # BLD AUTO: 1.9 K/UL (ref 1–4.8)
LYMPHOCYTES NFR BLD: 20.3 % (ref 18–48)
MAGNESIUM SERPL-MCNC: 1.9 MG/DL (ref 1.6–2.6)
MCH RBC QN AUTO: 29 PG (ref 27–31)
MCHC RBC AUTO-ENTMCNC: 30.9 G/DL (ref 32–36)
MCV RBC AUTO: 94 FL (ref 82–98)
MONOCYTES # BLD AUTO: 0.6 K/UL (ref 0.3–1)
MONOCYTES NFR BLD: 6.5 % (ref 4–15)
NEUTROPHILS # BLD AUTO: 6.8 K/UL (ref 1.8–7.7)
NEUTROPHILS NFR BLD: 71.8 % (ref 38–73)
NRBC BLD-RTO: 0 /100 WBC
PHOSPHATE SERPL-MCNC: 4.7 MG/DL (ref 2.7–4.5)
PLATELET # BLD AUTO: 117 K/UL (ref 150–450)
PMV BLD AUTO: 11.4 FL (ref 9.2–12.9)
POCT GLUCOSE: 128 MG/DL (ref 70–110)
POCT GLUCOSE: 170 MG/DL (ref 70–110)
POCT GLUCOSE: 202 MG/DL (ref 70–110)
POCT GLUCOSE: 67 MG/DL (ref 70–110)
POTASSIUM SERPL-SCNC: 4.2 MMOL/L (ref 3.5–5.1)
RBC # BLD AUTO: 2.86 M/UL (ref 4–5.4)
SODIUM SERPL-SCNC: 140 MMOL/L (ref 136–145)
WBC # BLD AUTO: 9.44 K/UL (ref 3.9–12.7)

## 2022-12-05 PROCEDURE — 84100 ASSAY OF PHOSPHORUS: CPT

## 2022-12-05 PROCEDURE — 36415 COLL VENOUS BLD VENIPUNCTURE: CPT

## 2022-12-05 PROCEDURE — 20600001 HC STEP DOWN PRIVATE ROOM

## 2022-12-05 PROCEDURE — 99233 PR SUBSEQUENT HOSPITAL CARE,LEVL III: ICD-10-PCS | Mod: 95,,, | Performed by: INTERNAL MEDICINE

## 2022-12-05 PROCEDURE — 99232 SBSQ HOSP IP/OBS MODERATE 35: CPT | Mod: ,,, | Performed by: INTERNAL MEDICINE

## 2022-12-05 PROCEDURE — 63600175 PHARM REV CODE 636 W HCPCS: Performed by: INTERNAL MEDICINE

## 2022-12-05 PROCEDURE — 97530 THERAPEUTIC ACTIVITIES: CPT

## 2022-12-05 PROCEDURE — 85025 COMPLETE CBC W/AUTO DIFF WBC: CPT | Performed by: STUDENT IN AN ORGANIZED HEALTH CARE EDUCATION/TRAINING PROGRAM

## 2022-12-05 PROCEDURE — 97110 THERAPEUTIC EXERCISES: CPT

## 2022-12-05 PROCEDURE — 97116 GAIT TRAINING THERAPY: CPT | Mod: CQ

## 2022-12-05 PROCEDURE — 99232 PR SUBSEQUENT HOSPITAL CARE,LEVL II: ICD-10-PCS | Mod: ,,, | Performed by: INTERNAL MEDICINE

## 2022-12-05 PROCEDURE — 25000003 PHARM REV CODE 250: Performed by: HOSPITALIST

## 2022-12-05 PROCEDURE — 83735 ASSAY OF MAGNESIUM: CPT

## 2022-12-05 PROCEDURE — 99233 SBSQ HOSP IP/OBS HIGH 50: CPT | Mod: 95,,, | Performed by: INTERNAL MEDICINE

## 2022-12-05 PROCEDURE — 25000003 PHARM REV CODE 250: Performed by: INTERNAL MEDICINE

## 2022-12-05 PROCEDURE — 25000003 PHARM REV CODE 250

## 2022-12-05 PROCEDURE — 80048 BASIC METABOLIC PNL TOTAL CA: CPT | Performed by: STUDENT IN AN ORGANIZED HEALTH CARE EDUCATION/TRAINING PROGRAM

## 2022-12-05 RX ORDER — INSULIN ASPART 100 [IU]/ML
1-10 INJECTION, SOLUTION INTRAVENOUS; SUBCUTANEOUS
Status: DISCONTINUED | OUTPATIENT
Start: 2022-12-05 | End: 2022-12-09

## 2022-12-05 RX ADMIN — HYDRALAZINE HYDROCHLORIDE 50 MG: 50 TABLET ORAL at 03:12

## 2022-12-05 RX ADMIN — CARVEDILOL 12.5 MG: 12.5 TABLET, FILM COATED ORAL at 10:12

## 2022-12-05 RX ADMIN — PANTOPRAZOLE SODIUM 40 MG: 40 TABLET, DELAYED RELEASE ORAL at 06:12

## 2022-12-05 RX ADMIN — PANTOPRAZOLE SODIUM 40 MG: 40 TABLET, DELAYED RELEASE ORAL at 04:12

## 2022-12-05 RX ADMIN — HYDRALAZINE HYDROCHLORIDE 50 MG: 50 TABLET ORAL at 09:12

## 2022-12-05 RX ADMIN — LEVOTHYROXINE SODIUM 25 MCG: 25 TABLET ORAL at 06:12

## 2022-12-05 RX ADMIN — INSULIN ASPART 4 UNITS: 100 INJECTION, SOLUTION INTRAVENOUS; SUBCUTANEOUS at 12:12

## 2022-12-05 RX ADMIN — CARVEDILOL 12.5 MG: 12.5 TABLET, FILM COATED ORAL at 09:12

## 2022-12-05 RX ADMIN — WHITE PETROLATUM: 1.75 OINTMENT TOPICAL at 12:12

## 2022-12-05 RX ADMIN — AMLODIPINE BESYLATE 5 MG: 5 TABLET ORAL at 10:12

## 2022-12-05 RX ADMIN — PREDNISONE 15 MG: 5 TABLET ORAL at 11:12

## 2022-12-05 RX ADMIN — QUETIAPINE FUMARATE 12.5 MG: 25 TABLET ORAL at 09:12

## 2022-12-05 RX ADMIN — ALUMINUM HYDROXIDE, MAGNESIUM HYDROXIDE, AND SIMETHICONE 30 ML: 200; 200; 20 SUSPENSION ORAL at 10:12

## 2022-12-05 RX ADMIN — HYDRALAZINE HYDROCHLORIDE 50 MG: 50 TABLET ORAL at 06:12

## 2022-12-05 RX ADMIN — ONDANSETRON 4 MG: 2 INJECTION INTRAMUSCULAR; INTRAVENOUS at 10:12

## 2022-12-05 NOTE — PROGRESS NOTES
[facility] ([place] [id number])  Nephrology  Progress Note    Patient Name: Kristin Goodman  MRN: [id number]  Admission Date: 10/17/2022  Hospital Length of Stay: 49 days  Attending Provider: Haleigh Parks MD   Primary Care Physician: Lori Hernandez MD  Principal Problem:Microscopic polyangiitis    Subjective:     HPI: Kristin Goodman is a 77 year old female with hypertension, hypothyroidism, HFpEF who presents for cough, shortness of breath, and weakness.  Patient initially presented to ER on 09/24 with hemoptysis and imaging concerning for multilobar pneumonia at which time she was discharged on a 10 day course of levofloxacin.  Patient initially had some improvement but began having worsening symptoms on 10/14.  Patient describes multiple fevers and progressive shortness of breath.  She continues to have intermittent hemoptysis.  Patient with worsening leukocytosis and limited clinical improvement despite broad-spectrum antibiotics.  She remains on cefepime.  Patient is a noted to have baseline creatinine of 1 as recent as 8/2022 but progressive worsening of creatinine in early October.  On admission patient with creatinine of 1.6 (10/17) with subsequent progression and creatinine of 3.0 at time of consultation (10/23).  Nephrology consulted for acute kidney injury.      Interval History: Patient evaluated at bedside. No acute events overnight. Refers feeling well, no complaints this AM. Completed hemodialysis over the weekend with no events.     Review of patient's allergies indicates:   Allergen Reactions    Ampicillin     Peaches [peach (prunus persica)] Other (See Comments)     Pt unable to state type of reaction. Information obtained from daughter who states she was informed of allergy from patient.    Penicillins      Other reaction(s): Hives    Sulfa (sulfonamide antibiotics) Rash and Hives     Current Facility-Administered Medications   Medication Frequency    0.9%  NaCl infusion Once     acetaminophen tablet 650 mg Q4H PRN    albuterol-ipratropium 2.5 mg-0.5 mg/3 mL nebulizer solution 3 mL Q4H PRN    aluminum-magnesium hydroxide-simethicone 200-200-20 mg/5 mL suspension 30 mL QID PRN    amLODIPine tablet 5 mg Daily    [START ON 12/6/2022] atovaquone 750 mg/5 mL oral liquid 1,500 mg Daily    bisacodyL suppository 10 mg Daily PRN    carvediloL tablet 12.5 mg BID    cloNIDine tablet 0.1 mg Q8H PRN    dextrose 10% bolus 125 mL PRN    dextrose 10% bolus 250 mL PRN    epoetin hira injection 2,880 Units Every Mon, Wed, Fri    glucagon (human recombinant) injection 1 mg PRN    glucose chewable tablet 16 g PRN    glucose chewable tablet 24 g PRN    heparin (porcine) injection 1,000 Units PRN    heparin (porcine) injection 1,000 Units PRN    heparin (porcine) injection 1,000 Units PRN    heparin, porcine (PF) 100 unit/mL injection flush 500 Units PRN    heparin, porcine (PF) 100 unit/mL injection flush 500 Units PRN    hydrALAZINE tablet 50 mg Q8H    insulin aspart U-100 pen 1-10 Units PRN    levothyroxine tablet 25 mcg Before breakfast    melatonin tablet 6 mg Nightly PRN    methocarbamoL tablet 500 mg TID PRN    metoclopramide HCl injection 5 mg Q6H PRN    naloxone 0.4 mg/mL injection 0.02 mg PRN    ondansetron injection 4 mg Q8H PRN    pantoprazole EC tablet 40 mg BID AC    predniSONE tablet 15 mg with lunch    Followed by    [START ON 12/18/2022] predniSONE tablet 12.5 mg with lunch    Followed by    [START ON 1/1/2023] predniSONE tablet 10 mg with lunch    [START ON 1/15/2023] predniSONE tablet 5 mg Daily    prochlorperazine injection Soln 5 mg Q6H PRN    quetiapine split tablet 12.5 mg QHS    simethicone chewable tablet 80 mg QID PRN    sodium chloride 0.9% 250 mL flush bag 1 time in Clinic[facility] in [place]    sodium chloride 0.9% bolus 250 mL PRN    sodium chloride 0.9% bolus 250 mL PRN    sodium chloride 0.9% flush 10 mL PRN    sodium chloride 0.9% flush 10 mL PRN    sodium chloride 0.9% flush 10 mL PRN    sodium  chloride 0.9% flush 10 mL PRN    sucralfate tablet 1 g TID PRN    white petrolatum 41 % ointment Daily     Facility-Administered Medications Ordered in Other Encounters   Medication Frequency    celecoxib capsule 400 mg Once    fentaNYL 50 mcg/mL injection  mcg PRN    LIDOcaine (PF) 10 mg/ml (1%) injection 10 mg Once PRN    LIDOcaine (PF) 10 mg/ml (1%) injection 10 mg Once    midazolam (VERSED) 1 mg/mL injection 0.5-4 mg PRN    ropivacaine 0.2% [facility] PainPRO Pump infusion 500 ML Continuous       Objective:     Vital Signs (Most Recent):  Temp: 98 °F (36.7 °C) (12/05/22 1204)  Pulse: 70 (12/05/22 1204)  Resp: 16 (12/05/22 1204)  BP: (!) 125/56 (12/05/22 1204)  SpO2: 97 % (12/05/22 1204)  O2 Device (Oxygen Therapy): room air (12/04/22 1609)   Vital Signs (24h Range):  Temp:  [96.4 °F (35.8 °C)-99.7 °F (37.6 °C)] 98 °F (36.7 °C)  Pulse:  [67-74] 70  Resp:  [16-18] 16  SpO2:  [93 %-97 %] 97 %  BP: (118-131)/(56-61) 125/56     Weight: 68.5 kg (151 lb 0.2 oz) (12/05/22 0500)  Body mass index is 28.53 kg/m².  Body surface area is 1.72 meters squared.    No intake/output data recorded.    Physical Exam  Vitals and nursing note reviewed.   Constitutional:       General: She is awake.      Appearance: She is well-developed. She is ill-appearing. She is not toxic-appearing or diaphoretic.   HENT:      Head: Normocephalic and atraumatic.      Right Ear: External ear normal.      Left Ear: External ear normal.      Nose:      Comments: + NGT     Mouth/Throat:      Mouth: Mucous membranes are dry.   Eyes:      General: Lids are normal. No scleral icterus.        Right eye: No discharge.         Left eye: No discharge.   Cardiovascular:      Rate and Rhythm: Normal rate and regular rhythm.   Pulmonary:      Effort: Pulmonary effort is normal.      Breath sounds: Normal breath sounds. No wheezing or rhonchi.   Abdominal:      General: Bowel sounds are normal.      Palpations: Abdomen is soft.      Tenderness: There is no  "abdominal tenderness.   Musculoskeletal:         General: No deformity.      Cervical back: Normal range of motion and neck supple. No rigidity or tenderness.      Right lower leg: No edema.      Left lower leg: No edema.   Skin:     General: Skin is warm and dry.   Neurological:      General: No focal deficit present.      Mental Status: She is alert and oriented to person, place, and time. Mental status is at baseline.   Psychiatric:         Attention and Perception: Attention normal.         Behavior: Behavior normal.       Significant Labs:  CBC:   Recent Labs   Lab 12/05/22  0448   WBC 9.44   RBC 2.86*   HGB 8.3*   HCT 26.9*   *   MCV 94   MCH 29.0   MCHC 30.9*       CMP:   Recent Labs   Lab 12/05/22  0448   GLU 63*   CALCIUM 7.7*      K 4.2   CO2 22*      BUN 45*   CREATININE 6.4*       All labs within the past 24 hours have been reviewed.       Assessment/Plan:     * Microscopic polyangiitis  See "Primary pauci-immune necrotizing and crescentic glomerulonephritis"      Anemia due to chronic kidney disease  EPO TIW    Primary pauci-immune necrotizing and crescentic glomerulonephritis  Kidney biopsy (10/26): pauci immune necrotizing and crescentic glomerulonephritis   s/p IV Solumedrol 1000 mg x3 doses.  PLEX 10/26, 10/27, 10/29, 10/30, 11/1, 11/2, 11/3 (prior to Rituxan)  Rituximab 375 mg/m^2 (600 mg) on 10/27 11/3, 11/10 and 11/17 (completed 4 doses)  Cyclophosphamide 7.5 mg/kg on 10/27 and 11/10 ( every 14 days ); has completed 2 doses; may need another dose of cyclophosphamide 6 weeks after her last dose (around December 22, 2022)  Serum BUN/Cr slowly trending up; will continue monitoring daily for dialysis needs  Now following PEXIVAS steroid taper; currently on Prednisone 15mg PO daily   Sodium bicarb 650mg PO BID   Continue Atovaquone for prophylaxis   Strict I/Os and chart   Avoid nephrotoxic agents as much as possible  No emergent need for hemodialysis today; will continue evaluation " on a daily basis                James Gomez MD  Nephrology  [facility]    ATTENDING PHYSICIAN ATTESTATION  I have personally verified the history and examined the patient. I thoroughly reviewed the demographic, clinical, laboratorial and imaging information available in medical records. I agree with the assessment and recommendations provided by the subspecialty resident who was under my supervision.

## 2022-12-05 NOTE — PROGRESS NOTES
[facility] - Intensive Care ([place] [id number])  [facility] Medicine  Telemedicine Progress Note    Patient Name: Kristin Goodman  MRN: [id number]  Patient Class: IP- Inpatient   Admission Date: 10/17/2022  Length of Stay: 48 days  Attending Physician: Haleigh Parks MD  Primary Care Provider: Lori Hernandez MD          Subjective:     Principal Problem:Microscopic polyangiitis        HPI:  Kristin Goodman is a 77 y.o. female with a past medical history of HTN, hypothyroidism, HFpEF, and osteoarthritis of the arm who has presented to the ED for cough, SOB, and weakness. Daughter is present at the bedside. Patient presented to the ED on 9/24 with hemoptysis, CT and CXR showed high suspicion for multilobar pneumonia. Patient was discharged with a 10-day course of levofloxacin and an albuterol inhaler. Patient followed up with her PCP on 10/4 with slight improvement in symptoms and went back to work. Patient continued to have worsening symptoms and presented to the ED again on 10/14 for evaluation and no interventions were done. Patient endorses fevers over the last few days up to 102.4 F with progressive SOB. She endorses hemoptysis with moderate amount of blood, generalized weakness, productive cough, SOB, and loss of appetite. Denies chest pain, nausea, vomiting, abdominal pain, or urinary changes.    ED: hypertensive up to 219/93 and tachycardic up to 117. Oxygen saturation on 92% on RA, placed on 5L NC with sats >97%. CBC remarkable for leukocytosis of 16.03 and Hb 7.7. K 3.3. Cr 1.6, baseline ~1.0. . Troponin 0.061. COVID and flu negative. EKG NSR. CT chest with contrast pending at time of admission. Given home amlodipine and lisinopril. Given 500mL NS, IV azithromycin, cefepime and vanc.       Overview/Hospital Course:  HM Course:  Ms. Goodman was admitted to Hospital Medicine for management of multifocal pneumonia and acute hypoxemic respiratory failure after presenting to the ED with fevers, cough,  hemoptysis, and dyspnea. She required escalation to the MICU due to abrupt decline in her respiratory status, associated with hemoptysis and requiring NIPPV. CT chest showed bilateral patchy ground glass opacities. Labs additionally were notable for acute renal failure on CKD3. Pulmonology was consulted while under the care of [facility] and felt this picture was consistent with diffuse alveolar hemorrhage.     ICU Course:   Her workup revealed a strongly positive MPO Ab consistent with clinical picture of microscopic polyangiitis with pulmonary and renal involvement. She underwent Trialysis catheter placement 10/25/2022 in the Medical ICU. 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     Rheumatology was consulted, extensive workup revealed MITUL + 1:2560 homogenous, +dsDNA, normal complements, PR3 1.2 (slight +),  (+), cANCA + 1:80, pANCA neg, GBMAb neg, trace cryos. Due to concern for MPA, she was started on pulse dose IV methylprednisolone 1000mg for 3 days, and plasmapheresis under the guidance of Transfusion Medicine. Patient was placed on steroid taper and completed PLEX. She additionally received rituximab and cyclophosphamide. OI prophylaxis was provided with atovaquone due to a sulfa allergy.     Nephrology was consulted for evaluation of acute renal failure in the setting of MPA. She did require SLED in the ICU for renal clearance and remains on intermittent hemodialysis. She is expected to continue HD once discharged.     Her acute hypoxemic respiratory failure improved and she was weaned off of supplemental oxygen. She stepped down to [facility] 10/28.     HM Course:  She underwent MBBS for evaluation of dysphagia, which showed global delayed  initiation of swallow and aspiration with thin liquids. Due to concern for possible prior stroke, head imaging was completed and negative for acute finding. She was NPO with NG tube. ENT was consulted for evaluation of dysphagia and cervical adenopathy.  Patient did not tolerate laryngoscopy; unable to visualize vocal cords but given her lack of hoarseness, they did not suspect vocal fold paresis/paralysis. MRI recommended by rheum to fully rule out any potential neurological cause of the patient's dysphagia; MRI demonstrated remote left thalamic lacunar type infarct and punctate remote left cerebellar infarcts.  She continued to work with speech therapy.  EGD completed 11/14 without abnormalities.  Per GI, Suspect some aspect of esophageal dysmotility in setting of critical illness. No further testing at this time.  Per SLP, diet advanced on 11/15 and NG tube removed.    Her other chronic medical conditions including hypothyroidism, diastolic CHF, and hypertension were managed with her home medications, with dose adjustments as needed.     Patient was noted to have downtrending Hg 11/17- required 1u pRBC. Rheum continued to follow for further steroid dosing. Patient was started on 2g NaCl tab TID for SIADH. SLP recommended mechanical soft diet with thin liquids 11/17. Na normalized 11/18. Hg improved to 8.5 following 1u pRBC. Patient had some hyperkalemia- started on scheduled lokelma. Patient continued to have improved UOP; however, BUN: Cr ratio uptrended. Nephro decided to hang off on HD as patient was having 500-600mL/24hr. He was started on scheduled NaHCO3 as ewll. Patient underwent HD 11/23. Patient had drop in Hg once again- 6.5 on 11/25. Given 1u pRBC (2nd unit). Underwent HD 11/30. Nephrology following to re-evaluate daily for need for HD and permacath placement.         This encounter was provided through telemedicine.  Patient was transferred to the telemedicine service on:  12/03/2022   The patient  location is: [id number] A admitted 10/17/2022 10:12 AM.    Interval History/Overnight Events:     Patient able to provide adequate history - daughter at bedside.    Felt dizzy this am with several BP meds held - SBP in 130's; feeling very weak this am and she thinks it is due to dialysis with fluid removal; she also feels she is worse when she takes medications    Labs reviewed - plt 98      Review of Systems   Constitutional:  Positive for activity change, appetite change and fatigue. Negative for fever.   Respiratory:  Negative for shortness of breath.    Cardiovascular:  Positive for leg swelling.   Gastrointestinal:  Negative for abdominal pain, diarrhea, nausea and vomiting.   Musculoskeletal:  Negative for arthralgias.      Inpatient Medications:  Scheduled Meds:   sodium chloride 0.9%   Intravenous Once    amLODIPine  10 mg Oral Daily    [START ON 12/6/2022] atovaquone  1,500 mg Oral Daily    carvediloL  25 mg Oral BID    epoetin hira (PROCRIT) injection  50 Units/kg Subcutaneous Every Mon, Wed, Fri    hydrALAZINE  100 mg Oral Q8H    levothyroxine  25 mcg Oral Before breakfast    pantoprazole  40 mg Oral BID AC    predniSONE  12.5 mg Oral Daily    Followed by    [START ON 12/18/2022] predniSONE  10 mg Oral Daily    Followed by    [START ON 1/1/2023] predniSONE  5 mg Oral Daily    QUEtiapine  25 mg Oral QHS    sodium bicarbonate  650 mg Oral BID    sodium chloride 0.9% flush bag IVPB   Intravenous 1 time in Clinic/HOD     Continuous Infusions:  PRN Meds:.acetaminophen, albuterol-ipratropium, aluminum-magnesium hydroxide-simethicone, bisacodyL, cloNIDine, dextrose 10%, dextrose 10%, glucagon (human recombinant), glucose, glucose, heparin (porcine), heparin (porcine), heparin (porcine), heparin, porcine (PF), heparin, porcine (PF), insulin aspart U-100, melatonin, methocarbamoL, metoclopramide HCl, naloxone, ondansetron, prochlorperazine, simethicone, sodium chloride 0.9%, sodium chloride 0.9%,  sodium chloride 0.9%, sodium chloride 0.9%, sodium chloride 0.9%, sodium chloride 0.9%, sucralfate      Objective:     Temp:  [97.4 °F (36.3 °C)-98.9 °F (37.2 °C)] 98.8 °F (37.1 °C)  Pulse:  [50-71] 71  Resp:  [17-18] 17  SpO2:  [93 %-96 %] 93 %  BP: (104-146)/(52-67) 107/52      Intake/Output Summary (Last 24 hours) at 12/4/2022 1046  Last data filed at 12/3/2022 1514  Gross per 24 hour   Intake 600 ml   Output 1100 ml   Net -500 ml          Body mass index is 28.62 kg/m².    Physical Exam  Vitals and nursing note reviewed.   Constitutional:       General: She is not in acute distress.     Appearance: Normal appearance.   HENT:      Head: Normocephalic and atraumatic.   Eyes:      General: No scleral icterus.        Right eye: No discharge.         Left eye: No discharge.      Extraocular Movements: Extraocular movements intact.   Cardiovascular:      Rate and Rhythm: Normal rate.   Pulmonary:      Effort: Pulmonary effort is normal. No tachypnea or respiratory distress.   Musculoskeletal:      Right lower leg: Edema present.      Left lower leg: Edema present.   Neurological:      General: No focal deficit present.      Mental Status: She is alert and oriented to person, place, and time.      Cranial Nerves: No cranial nerve deficit.      Motor: No weakness.   Psychiatric:         Mood and Affect: Mood and affect normal.         Speech: Speech normal.         Behavior: Behavior is cooperative.         Thought Content: Thought content normal.        Labs:  Recent Results (from the past 24 hour(s))   POCT glucose    Collection Time: 12/03/22  3:09 PM   Result Value Ref Range    POCT Glucose 118 (H) 70 - 110 mg/dL   POCT glucose    Collection Time: 12/03/22  4:17 PM   Result Value Ref Range    POCT Glucose 130 (H) 70 - 110 mg/dL   POCT glucose    Collection Time: 12/03/22  9:36 PM   Result Value Ref Range    POCT Glucose 113 (H) 70 - 110 mg/dL   Magnesium    Collection Time: 12/04/22  2:43 AM   Result Value Ref Range     Magnesium 1.9 1.6 - 2.6 mg/dL   Phosphorus    Collection Time: 12/04/22  2:43 AM   Result Value Ref Range    Phosphorus 4.5 2.7 - 4.5 mg/dL   CBC Auto Differential    Collection Time: 12/04/22  2:43 AM   Result Value Ref Range    WBC 10.27 3.90 - 12.70 K/uL    RBC 2.91 (L) 4.00 - 5.40 M/uL    Hemoglobin 8.5 (L) 12.0 - 16.0 g/dL    Hematocrit 26.3 (L) 37.0 - 48.5 %    MCV 90 82 - 98 fL    MCH 29.2 27.0 - 31.0 pg    MCHC 32.3 32.0 - 36.0 g/dL    RDW 15.0 (H) 11.5 - 14.5 %    Platelets 98 (L) 150 - 450 K/uL    MPV 12.4 9.2 - 12.9 fL    Immature Granulocytes 1.0 (H) 0.0 - 0.5 %    Gran # (ANC) 7.5 1.8 - 7.7 K/uL    Immature Grans (Abs) 0.10 (H) 0.00 - 0.04 K/uL    Lymph # 2.0 1.0 - 4.8 K/uL    Mono # 0.7 0.3 - 1.0 K/uL    Eos # 0.0 0.0 - 0.5 K/uL    Baso # 0.01 0.00 - 0.20 K/uL    nRBC 0 0 /100 WBC    Gran % 72.9 38.0 - 73.0 %    Lymph % 19.2 18.0 - 48.0 %    Mono % 6.7 4.0 - 15.0 %    Eosinophil % 0.1 0.0 - 8.0 %    Basophil % 0.1 0.0 - 1.9 %    Differential Method Automated    Basic Metabolic Panel    Collection Time: 12/04/22  2:43 AM   Result Value Ref Range    Sodium 140 136 - 145 mmol/L    Potassium 4.4 3.5 - 5.1 mmol/L    Chloride 104 95 - 110 mmol/L    CO2 22 (L) 23 - 29 mmol/L    Glucose 55 (L) 70 - 110 mg/dL    BUN 38 (H) 8 - 23 mg/dL    Creatinine 5.8 (H) 0.5 - 1.4 mg/dL    Calcium 7.4 (L) 8.7 - 10.5 mg/dL    Anion Gap 14 8 - 16 mmol/L    eGFR 7.1 (A) >60 mL/min/1.73 m^2   POCT glucose    Collection Time: 12/04/22  7:55 AM   Result Value Ref Range    POCT Glucose 61 (L) 70 - 110 mg/dL        Lab Results   Component Value Date    [id number] Not Detected 10/24/2022       Recent Labs   Lab 12/02/22 0423 12/03/22 0313 12/04/22  0243   WBC 12.24 11.67 10.27   LYMPH 15.6*  1.9 15.0*  1.8 19.2  2.0   HGB 8.7* 8.6* 8.5*   HCT 27.8* 28.0* 26.3*   * 135* 98*       Recent Labs   Lab 12/02/22 0423 12/03/22 0312 12/04/22 0243    135* 140   K 4.3 4.3 4.4   CL 98 96 104   CO2 24 24 22*   BUN 62*  71* 38*   CREATININE 8.1* 8.3* 5.8*   GLU 71 68* 55*   CALCIUM 7.7* 7.8* 7.4*   MG 1.9 1.9 1.9   PHOS 5.0* 5.4* 4.5       No results for input(s): ALKPHOS, ALT, AST, ALBUMIN, PROT, BILITOT, INR in the last 168 hours.       No results for input(s): DDIMER, FERRITIN, CRP, LDH, BNP, TROPONINI, CPK in the last 72 hours.    Invalid input(s): PROCALCITONIN    All labs within the last 24 hours were reviewed.     Microbiology:  Microbiology Results (last 7 days)       ** No results found for the last 168 hours. **              Imaging  ECG Results              EKG 12-lead (Final result)  Result time 10/17/22 14:26:15      Final result by Interface, Lab In [facility] (10/17/22 14:26:15)                   Narrative:    Test Reason : R06.02,    Vent. Rate : 093 BPM     Atrial Rate : 093 BPM     P-R Int : 126 ms          QRS Dur : 070 ms      QT Int : 356 ms       P-R-T Axes : 048 052 030 degrees     QTc Int : 442 ms    Normal sinus rhythm  Normal ECG  When compared with ECG of 14-OCT-2022 14:26,  Limb lead reversal has been corrected  Confirmed by Jc Barbosa MD ([id number]) on 10/17/2022 2:26:04 PM    Referred By: [email or web address]   SELF           Confirmed By:Jc Barbosa MD                                    Results for orders placed during the hospital encounter of 10/17/22    Echo    Interpretation Summary  · The left ventricle is normal in size with normal systolic function. The estimated ejection fraction is 65%.  · Normal right ventricular size with normal right ventricular systolic function.  · Grade I left ventricular diastolic dysfunction.  · Mild tricuspid regurgitation.  · There is pulmonary hypertension. The estimated PA systolic pressure is 56 mmHg.  · Normal to low central venous pressure (3 mmHg).      X-Ray Abdomen AP 1 View  Narrative: EXAMINATION:  XR ABDOMEN AP 1 VIEW    CLINICAL HISTORY:  NGT placement; Dysphagia, unspecified    TECHNIQUE:  AP View(s) of the abdomen was performed.    COMPARISON:  No 11/20/2022  ne    FINDINGS:  Feeding tube in the stomach.  No significant bowel dilatation.  Impression: See above    Electronically signed by: Ej Strickland MD  Date:    11/14/2022  Time:    11:02      All imaging within the last 24 hours was reviewed.       Discharge Planning   ANTHONY: 12/8/2022     Code Status: Full Code   Is the patient medically ready for discharge?: No    Reason for patient still in hospital (select all that apply): Patient trending condition, Laboratory test, Treatment, Consult recommendations, and Pending disposition  Discharge Plan A: Skilled Nursing Facility   Discharge Delays: None known at this time          Assessment/Plan:      * Microscopic polyangiitis  As of 10/26/22 strongly positive MPO Ab (in contrast to borderline positive PR3), consistent with clinical picture of microscopic polyangiitis with pulmonary, and renal involvement after presenting with acute hypoxemic respiratory failure, hemoptysis, and acute renal failure.  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   3)  CHANGES SUGGESTIVE OF FOCAL ACUTE PYELONEPHRITIS.   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 Currently on oral  steroid taper   - plan for 20mg IV daily on 11/6 for 14 days (last dose of 20mg equivalent is 11/20/2022); completing oral Prednisoneper  PEXIVAS steroid taper  - apheresis: underwent PLEX 10/26, 10/27, 10/30, 11/1, 11/2, 11/3  - rituximab every 7 days for 4 doses: Dose #1: 10/27, #2 11/3, #3 11/10, and #4 given 11/17  - cyclophosphamide every 14 days for 2 doses: 10/27, 11/10  - Opportunistic Infection ppx: atovaquone 1500mg PO daily  - GI bleed prophylaxis: pantoprazole 40mg daily     Anemia due to chronic kidney disease  - 2/2 CKD  - Hg stable  - Monitor trend     Primary pauci-immune necrotizing and crescentic glomerulonephritis  Patient with acute kidney injury likely due to microscopic polyangiitis WILFREDO is currently stable. Labs reviewed- Renal function/electrolytes with Estimated Creatinine Clearance: 5.3 mL/min (A) (based on SCr of 8.3 mg/dL (H)). according to latest data. Monitor urine output and serial BMP and adjust therapy as needed. Avoid nephrotoxins and renally dose meds for GFR listed above.   Nephrology following and patient receives HD as indicated.       Gastric reflux  - PPI BID      High risk medications (not anticoagulants) long-term use  See history of vasculitis therapy      Anuria  - Improving urine output  - Nephrology following- re-evaluating daily for need for Intermittent HD and permacath placement     Gastrointestinal hemorrhage with melena  Starting having hemoptysis and melena with associated acute blood loss anemia earlier in hospital stay. Patient with known internal hemorrhoids, mild sigmoid diverticulosis, history of gastritis and + H pylori (2012 EGD).   - GI consulted 10/19, unable to perform EGD due to instability and respiratory failure at the time    Plan  - PPI PO BID   - Monitor CBC closely for signs of recurrent bleed  - EGD w/no acute bleed seen  - Transfused 2u pRBC    Dysphagia  SLP following  MBSS showing global weakness in swallowing as well as aspiration with thin  liquids.   ENT consulted but patient did not tolerate laryngoscopy     Plan   - Dysphagia possibly 2/2 previous stroke  - Briefly required NGT, worked SLP  - NGT removed- being treated with nystatin swish and swallow for thrush  - GI consulted as well for concern of oropharyngeal candidiasis--> Low suspicion for esophageal candidiasis without odynophagia however can have if white plaques have been seen on oropharynx, ok to start fluconazole empirically for possible esophageal candidiasis  - EGD on 11/14 without abnormality; per GI, Suspect some aspect of esophageal dysmotility in setting of critical illness. No further testing at this time.  - Advanced to renal diet 11/22    Moderate malnutrition  Nutrition consulted. Most recent weight and BMI monitored-     Malnutrition (Moderate to Severe)  Weight Loss (Malnutrition): 7.5% in 3 months    Measurements:  Wt Readings from Last 1 Encounters:   12/03/22 70.6 kg (155 lb 10.3 oz)   Body mass index is 29.41 kg/m².    Recommendations: Recommendation/Intervention: 1.  Goals: Meet % EEN, EPN by RD f/u date    Patient has been screened and assessed by RD. RD will follow patient.      Acute renal failure on dialysis  Due to microscopic polyangiitis. Remains on hemodialysis intermittently.   - Baseline creatinine 1.0-1.2.   - New onset proteinuria/hematuria.   - Renal biopsy completed and   - Trialysis in place for intermittent Hemodialysis    Plan  - Nephrology consulted, appreciate management  - management of MPA as noted separately  - Renal function panel daily  - renally dose all medications  - intermittent Hemodialysis as indicated, per Nephrology--> still evaluating daily for HD need and permacath placement- if needing permacath placement, then will need to be done prior to dc and outpatient HD chair arranged.      Recent Labs   Lab 12/02/22  0423 12/03/22  0312 12/04/22  0243   BUN 62* 71* 38*   CREATININE 8.1* 8.3* 5.8*       Acute hypoxemic respiratory  failure  Multifocal pneumonia  Hemoptysis  Dyspnea  Diffuse Alveolar Hemorrahge    In the setting of a new diagnosis of microscopic polyangiitis, presented with fevers, dyspnea,.  - Chest imaging was consistent with diffuse alveolar hemorrhage.  CT chest wc 10/17 with JESSICA perihilar dense consolidations w/ peripheral patcy areas of GGO, JESSICA PNA, less c/f cardiogenic pulmonary edema.  - Patient upgraded to Medical ICU 10/23/22 with abrupt respiratory decline requiring NIPPV, improved with high dose IV steroids, plasmapheresis, emergent hemodialysis.   - Unable to perform bronchoscopy in the Medical ICU due to tenuous respiratory status  Hypoxia has resolved    Plan:  - weaned to room air  - continue management of underlying triggers with steroid and immunosuppressants  - incentive spirometry and respiratory hygiene    Lymphadenopathy of head and neck  - Has bilateral neck gland swelling with tenderness; consulted ENT  - No surgical intervention indicated   - Adenopathy likely reactive in nature   - Recommend further workup if it does not resolve within next 2 weeks   - 11/30-- Adenopathy is still present especially right submandibular region    Hyponatremia  - thought to be 2/2 SIADH initially- received NaCl tabs  - NaCl tabs removed  - Na stable on HD      Other specified anemias  In the setting of GI bleed with melena  - Evaluated by GI, see GI bleed documentation  - Last transfusion 10/28, Hgb slowly trending down since then  - Hgb 6.9 on 11/5    Plan  - Monitor CBC Daily   - Treat with pRBC transfusion PRN Hgb <7  - Transfused 3u pRBC    Current CBC reviewed-    Recent Labs   Lab 12/01/22  0344 12/02/22  0423 12/03/22  0313   HGB 8.5* 8.7* 8.6*       Chronic diastolic heart failure  Pulmonary Hypertension due to left heart disease  TTE (10/2022) EF 65%, G1DD  Home meds: Lisinopril, Toprol    Plan  - Active volume control with intermittent Hemodialysis per Nephrology   - Daily weights (standing if tolerated)  -  Strict I/Os  - Fluid restriction 1.5 day  - Cardiac diet w/ 2 g Na restriction    Hyperkalemia  - resolved    Primary hypertension  BP Readings from Last 1 Encounters:   12/04/22 (!) 121/59     - Patient was unable to tolerate PO mediations, all meds to be administered via NG tube until removed on 11/15.  - Will continue to monitor blood pressure trend closely and adjust antihypertensive regimen as clinically indicated and tolerated.    amLODIPine, carvediloL, hydrALAZINE; doses decreased on 12/4 due to low BP with extreme fatigue and weakness          Hypothyroid  - Continue synthroid 25 mcg  - TSH 0.585 10/27/22      VTE Risk Mitigation (From admission, onward)         Ordered     heparin (porcine) injection 1,000 Units  As needed (PRN)         11/29/22 0857     heparin (porcine) injection 1,000 Units  As needed (PRN)         11/22/22 0832     heparin, porcine (PF) 100 unit/mL injection flush 500 Units  As needed (PRN)         11/17/22 1343     heparin, porcine (PF) 100 unit/mL injection flush 500 Units  As needed (PRN)         11/10/22 1430     heparin (porcine) injection 1,000 Units  As needed (PRN)         11/09/22 1601     heparin (porcine) injection 1,000 Units  As needed (PRN)         11/08/22 0854     Place sequential compression device  Until discontinued         10/25/22 1731     IP VTE HIGH RISK PATIENT  Once         10/17/22 1354     Reason for No Pharmacological VTE Prophylaxis  Once        Question:  Reasons:  Answer:  Risk of Bleeding    10/17/22 1354                High Risk Conditions:  Patient has a condition that poses threat to life and bodily function: Acute Renal Failure      I have completed this tele-visit with the assistance of a telepresenter.    The attending portion of this evaluation, treatment, and documentation was performed per Haleigh Parks MD via Telemedicine AudioVisual using the secure [employer] software platform with 2 way audio/video. The provider was located off-site and the  patient is located in the hospital. The aforementioned video software was utilized to document the relevant history and physical exam    Haleigh Parks MD  Department of Hospital Medicine   [facility] - Intensive Care ([place]-)

## 2022-12-05 NOTE — NURSING
Patient A/OX4. GCS 15. VSS. Afebrile. Up to toilet w/ assist. BM during shift. Meds admin per MAR. Daughter updated at bedside. Denies pain. Stomach symptoms treated per MAR. Up to chair for meals. Will hand off to on coming RN.

## 2022-12-05 NOTE — PLAN OF CARE
Problem: Adult Inpatient Plan of Care  Goal: Plan of Care Review  Outcome: Ongoing, Progressing  Goal: Patient-Specific Goal (Individualized)  Outcome: Ongoing, Progressing  Goal: Absence of Hospital-Acquired Illness or Injury  Outcome: Ongoing, Progressing  Goal: Optimal Comfort and Wellbeing  Outcome: Ongoing, Progressing  Goal: Readiness for Transition of Care  Outcome: Ongoing, Progressing     Problem: Infection  Goal: Absence of Infection Signs and Symptoms  Outcome: Ongoing, Progressing     Problem: Adjustment to Illness (Sepsis/Septic Shock)  Goal: Optimal Coping  Outcome: Ongoing, Progressing     Problem: Bleeding (Sepsis/Septic Shock)  Goal: Absence of Bleeding  Outcome: Ongoing, Progressing     Problem: Glycemic Control Impaired (Sepsis/Septic Shock)  Goal: Blood Glucose Level Within Desired Range  Outcome: Ongoing, Progressing     Problem: Infection Progression (Sepsis/Septic Shock)  Goal: Absence of Infection Signs and Symptoms  Outcome: Ongoing, Progressing     Problem: Nutrition Impaired (Sepsis/Septic Shock)  Goal: Optimal Nutrition Intake  Outcome: Ongoing, Progressing     Problem: Fluid and Electrolyte Imbalance (Acute Kidney Injury/Impairment)  Goal: Fluid and Electrolyte Balance  Outcome: Ongoing, Progressing     Problem: Oral Intake Inadequate (Acute Kidney Injury/Impairment)  Goal: Optimal Nutrition Intake  Outcome: Ongoing, Progressing     Problem: Renal Function Impairment (Acute Kidney Injury/Impairment)  Goal: Effective Renal Function  Outcome: Ongoing, Progressing     Problem: Fluid Imbalance (Pneumonia)  Goal: Fluid Balance  Outcome: Ongoing, Progressing     Problem: Respiratory Compromise (Pneumonia)  Goal: Effective Oxygenation and Ventilation  Outcome: Ongoing, Progressing     Problem: Fall Injury Risk  Goal: Absence of Fall and Fall-Related Injury  Outcome: Ongoing, Progressing     Problem: Device-Related Complication Risk (CRRT (Continuous Renal Replacement Therapy))  Goal: Safe,  Effective Therapy Delivery  Outcome: Ongoing, Progressing     Problem: Skin Injury Risk Increased  Goal: Skin Health and Integrity  Outcome: Ongoing, Progressing     Problem: Hypothermia (CRRT (Continuous Renal Replacement Therapy))  Goal: Body Temperature Maintained in Desired Range  Outcome: Ongoing, Progressing     Problem: Infection (CRRT (Continuous Renal Replacement Therapy))  Goal: Absence of Infection Signs and Symptoms  Outcome: Ongoing, Progressing     Problem: Device-Related Complication Risk (Hemodialysis)  Goal: Safe, Effective Therapy Delivery  Outcome: Ongoing, Progressing     Problem: Hemodynamic Instability (Hemodialysis)  Goal: Effective Tissue Perfusion  Outcome: Ongoing, Progressing     Problem: Impaired Wound Healing  Goal: Optimal Wound Healing  Outcome: Ongoing, Progressing     Problem: Infection (Hemodialysis)  Goal: Absence of Infection Signs and Symptoms  Outcome: Ongoing, Progressing

## 2022-12-05 NOTE — ASSESSMENT & PLAN NOTE
As of 10/26/22 strongly positive MPO Ab (in contrast to borderline positive PR3), consistent with clinical picture of microscopic polyangiitis with pulmonary, and renal involvement after presenting with acute hypoxemic respiratory failure, hemoptysis, and acute renal failure.  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   3)  CHANGES SUGGESTIVE OF FOCAL ACUTE PYELONEPHRITIS.   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 Currently on oral steroid taper   - plan for 20mg IV daily on 11/6 for 14 days (last dose of 20mg equivalent is 11/20/2022); completing oral Prednisoneper  PEXIVAS steroid taper  - apheresis: underwent PLEX 10/26, 10/27, 10/30, 11/1, 11/2, 11/3  - rituximab every 7 days for 4 doses: Dose #1: 10/27, #2 11/3, #3 11/10, and #4 given 11/17  - cyclophosphamide every 14 days for 2 doses: 10/27, 11/10  - Opportunistic Infection ppx: atovaquone 1500mg PO daily  - GI bleed prophylaxis: pantoprazole 40mg daily

## 2022-12-05 NOTE — PLAN OF CARE
Pt is not medically ready to transfer to SNF yet.  SHe is being followed by [facility].  SW provided them with an update.  SW will f/u as needed.    Cailin Ridley, ALEJOW   PRN

## 2022-12-05 NOTE — ASSESSMENT & PLAN NOTE
Due to microscopic polyangiitis. Remains on hemodialysis intermittently.   - Baseline creatinine 1.0-1.2.   - New onset proteinuria/hematuria.   - Renal biopsy completed and   - Trialysis in place for intermittent Hemodialysis    Plan  - Nephrology consulted, appreciate management  - management of MPA as noted separately  - Renal function panel daily  - renally dose all medications  - intermittent Hemodialysis as indicated, per Nephrology--> still evaluating daily for HD need and permacath placement- if needing permacath placement, then will need to be done prior to dc and outpatient HD chair arranged.      Recent Labs   Lab 12/02/22  0423 12/03/22  0312 12/04/22  0243   BUN 62* 71* 38*   CREATININE 8.1* 8.3* 5.8*

## 2022-12-05 NOTE — SUBJECTIVE & OBJECTIVE
Interval History: Patient evaluated at bedside. No acute events overnight. Refers feeling well, no complaints this AM. Completed hemodialysis over the weekend with no events.     Review of patient's allergies indicates:   Allergen Reactions    Ampicillin     Peaches [peach (prunus persica)] Other (See Comments)     Pt unable to state type of reaction. Information obtained from daughter who states she was informed of allergy from patient.    Penicillins      Other reaction(s): Hives    Sulfa (sulfonamide antibiotics) Rash and Hives     Current Facility-Administered Medications   Medication Frequency    0.9%  NaCl infusion Once    acetaminophen tablet 650 mg Q4H PRN    albuterol-ipratropium 2.5 mg-0.5 mg/3 mL nebulizer solution 3 mL Q4H PRN    aluminum-magnesium hydroxide-simethicone 200-200-20 mg/5 mL suspension 30 mL QID PRN    amLODIPine tablet 5 mg Daily    [START ON 12/6/2022] atovaquone 750 mg/5 mL oral liquid 1,500 mg Daily    bisacodyL suppository 10 mg Daily PRN    carvediloL tablet 12.5 mg BID    cloNIDine tablet 0.1 mg Q8H PRN    dextrose 10% bolus 125 mL PRN    dextrose 10% bolus 250 mL PRN    epoetin hira injection 2,880 Units Every Mon, Wed, Fri    glucagon (human recombinant) injection 1 mg PRN    glucose chewable tablet 16 g PRN    glucose chewable tablet 24 g PRN    heparin (porcine) injection 1,000 Units PRN    heparin (porcine) injection 1,000 Units PRN    heparin (porcine) injection 1,000 Units PRN    heparin, porcine (PF) 100 unit/mL injection flush 500 Units PRN    heparin, porcine (PF) 100 unit/mL injection flush 500 Units PRN    hydrALAZINE tablet 50 mg Q8H    insulin aspart U-100 pen 1-10 Units PRN    levothyroxine tablet 25 mcg Before breakfast    melatonin tablet 6 mg Nightly PRN    methocarbamoL tablet 500 mg TID PRN    metoclopramide HCl injection 5 mg Q6H PRN    naloxone 0.4 mg/mL injection 0.02 mg PRN    ondansetron injection 4 mg Q8H PRN    pantoprazole EC tablet 40 mg BID AC     predniSONE tablet 15 mg with lunch    Followed by    [START ON 12/18/2022] predniSONE tablet 12.5 mg with lunch    Followed by    [START ON 1/1/2023] predniSONE tablet 10 mg with lunch    [START ON 1/15/2023] predniSONE tablet 5 mg Daily    prochlorperazine injection Soln 5 mg Q6H PRN    quetiapine split tablet 12.5 mg QHS    simethicone chewable tablet 80 mg QID PRN    sodium chloride 0.9% 250 mL flush bag 1 time in Clinic/HOD    sodium chloride 0.9% bolus 250 mL PRN    sodium chloride 0.9% bolus 250 mL PRN    sodium chloride 0.9% flush 10 mL PRN    sodium chloride 0.9% flush 10 mL PRN    sodium chloride 0.9% flush 10 mL PRN    sodium chloride 0.9% flush 10 mL PRN    sucralfate tablet 1 g TID PRN    white petrolatum 41 % ointment Daily     Facility-Administered Medications Ordered in Other Encounters   Medication Frequency    celecoxib capsule 400 mg Once    fentaNYL 50 mcg/mL injection  mcg PRN    LIDOcaine (PF) 10 mg/ml (1%) injection 10 mg Once PRN    LIDOcaine (PF) 10 mg/ml (1%) injection 10 mg Once    midazolam (VERSED) 1 mg/mL injection 0.5-4 mg PRN    ropivacaine 0.2% [facility] PainPRO Pump infusion 500 ML Continuous       Objective:     Vital Signs (Most Recent):  Temp: 98 °F (36.7 °C) (12/05/22 1204)  Pulse: 70 (12/05/22 1204)  Resp: 16 (12/05/22 1204)  BP: (!) 125/56 (12/05/22 1204)  SpO2: 97 % (12/05/22 1204)  O2 Device (Oxygen Therapy): room air (12/04/22 1609)   Vital Signs (24h Range):  Temp:  [96.4 °F (35.8 °C)-99.7 °F (37.6 °C)] 98 °F (36.7 °C)  Pulse:  [67-74] 70  Resp:  [16-18] 16  SpO2:  [93 %-97 %] 97 %  BP: (118-131)/(56-61) 125/56     Weight: 68.5 kg (151 lb 0.2 oz) (12/05/22 0500)  Body mass index is 28.53 kg/m².  Body surface area is 1.72 meters squared.    No intake/output data recorded.    Physical Exam  Vitals and nursing note reviewed.   Constitutional:       General: She is awake.      Appearance: She is well-developed. She is ill-appearing. She is not toxic-appearing or diaphoretic.    HENT:      Head: Normocephalic and atraumatic.      Right Ear: External ear normal.      Left Ear: External ear normal.      Nose:      Comments: + NGT     Mouth/Throat:      Mouth: Mucous membranes are dry.   Eyes:      General: Lids are normal. No scleral icterus.        Right eye: No discharge.         Left eye: No discharge.   Cardiovascular:      Rate and Rhythm: Normal rate and regular rhythm.   Pulmonary:      Effort: Pulmonary effort is normal.      Breath sounds: Normal breath sounds. No wheezing or rhonchi.   Abdominal:      General: Bowel sounds are normal.      Palpations: Abdomen is soft.      Tenderness: There is no abdominal tenderness.   Musculoskeletal:         General: No deformity.      Cervical back: Normal range of motion and neck supple. No rigidity or tenderness.      Right lower leg: No edema.      Left lower leg: No edema.   Skin:     General: Skin is warm and dry.   Neurological:      General: No focal deficit present.      Mental Status: She is alert and oriented to person, place, and time. Mental status is at baseline.   Psychiatric:         Attention and Perception: Attention normal.         Behavior: Behavior normal.       Significant Labs:  CBC:   Recent Labs   Lab 12/05/22  0448   WBC 9.44   RBC 2.86*   HGB 8.3*   HCT 26.9*   *   MCV 94   MCH 29.0   MCHC 30.9*       CMP:   Recent Labs   Lab 12/05/22  0448   GLU 63*   CALCIUM 7.7*      K 4.2   CO2 22*      BUN 45*   CREATININE 6.4*       All labs within the past 24 hours have been reviewed.

## 2022-12-05 NOTE — ASSESSMENT & PLAN NOTE
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  PLEX 10/26, 10/27, 10/29, 10/30, 11/1, 11/2, 11/3 (prior to Rituxan)  Rituximab 375 mg/m^2 (600 mg) on 10/27 11/3, 11/10 and 11/17 (completed 4 doses)  Cyclophosphamide 7.5 mg/kg on 10/27 and 11/10 ( every 14 days ); has completed 2 doses; may need another dose of cyclophosphamide 6 weeks after her last dose (around December 22, 2022)  Serum BUN/Cr slowly trending up; will continue monitoring daily for dialysis needs  Now following PEXIVAS steroid taper; currently on Prednisone 15mg PO daily   Sodium bicarb 650mg PO BID   Continue Atovaquone for prophylaxis   Strict I/Os and chart   Avoid nephrotoxic agents as much as possible  No emergent need for hemodialysis today; will continue evaluation on a daily basis

## 2022-12-05 NOTE — ASSESSMENT & PLAN NOTE
BP Readings from Last 1 Encounters:   12/04/22 (!) 121/59     - Patient was unable to tolerate PO mediations, all meds to be administered via NG tube until removed on 11/15.  - Will continue to monitor blood pressure trend closely and adjust antihypertensive regimen as clinically indicated and tolerated.    amLODIPine, carvediloL, hydrALAZINE; doses decreased on 12/4 due to low BP with extreme fatigue and weakness

## 2022-12-05 NOTE — PT/OT/SLP PROGRESS
Occupational Therapy   Treatment    Name: Kristin Goodman  MRN: [id number]  Admitting Diagnosis:  Microscopic polyangiitis  21 Days Post-Op    Recommendations:     Discharge Recommendations: nursing facility, skilled  Discharge Equipment Recommendations:  bedside commode, walker, rolling  Barriers to discharge:  None    Assessment:     Kristin Goodman is a 77 y.o. female with a medical diagnosis of Microscopic polyangiitis.  She presents with good participation and motivation. Pt continues to require (A) with all activities in standing & is at risk for falls. Goals remain appropriate. Performance deficits affecting function are weakness, impaired endurance, impaired self care skills, impaired functional mobility, decreased lower extremity function, decreased upper extremity function, decreased safety awareness, impaired cardiopulmonary response to activity.     Rehab Prognosis:  Good; patient would benefit from acute skilled OT services to address these deficits and reach maximum level of function.       Plan:     Patient to be seen 3 x/week to address the above listed problems via self-care/home management, therapeutic activities, therapeutic exercises, neuromuscular re-education  Plan of Care Expires: 12/29/22  Plan of Care Reviewed with: patient    Subjective   Pt reported that she was exhausted after HD yesterday & slept most of the day & all of the night afterward.  Pain/Comfort:  Pain Rating 1: 0/10  Pain Rating Post-Intervention 1: 0/10    Objective:     Communicated with: RN prior to session.  Patient found up in chair with  (pt with no lines; sister arrived at end of session) upon OT entry to room.    General Precautions: Standard, fall, aspiration    Orthopedic Precautions:N/A  Braces: N/A  Respiratory Status: Room air     Occupational Performance:     Functional Mobility/Transfers:  Patient completed Sit <> Stand Transfer with minimum assistance  with  rolling walker from chair  Functional Mobility: CGA with  functional mobility in room using RW with increased amount on this date in comparison to prior session for endurance training.      [facility] 6 Click ADL: 18    Treatment & Education:  Pt performed AROM exercises with BUE in 3 planes x 2 sets x 10 reps each while seated in chair for endurance & strength training.  Provided education on improvements in transfer as well as balance during mobility using RW on this date.  Provided education on hand placement during transfers using RW due to pt with tendency to want to have BUE on walker.  Pt had no further questions & when asked whether there were any concerns pt reported none.      Patient left up in chair with all lines intact, call button in reach, and RN notified & sister present.    GOALS:   Multidisciplinary Problems       Occupational Therapy Goals          Problem: Occupational Therapy    Goal Priority Disciplines Outcome Interventions   Occupational Therapy Goal     OT, PT/OT Ongoing, Progressing    Description: Goals to be met by: 12/15     Patient will increase functional independence with ADLs by performing:    UE Dressing with Modified [place].  LE Dressing with Modified [place].  Grooming while standing with Modified [place].  Toileting from toilet with Modified [place] for hygiene and clothing management.   Supine to sit with Modified [place].  Step transfer with Modified [place]  Toilet transfer to toilet with Modified [place].  Pt will demonstrate independence with theraputty HEP.                         Time Tracking:     OT Date of Treatment: 12/05/22  OT Start Time: 1025  OT Stop Time: 1050  OT Total Time (min): 25 min    Billable Minutes:Therapeutic Activity 10  Therapeutic Exercise 15    OT/SRAAHI: OT     SARAHI Visit Number: 0    12/5/2022

## 2022-12-05 NOTE — PLAN OF CARE
[facility] - Intensive Care ([facility])  Discharge Reassessment    Primary Care Provider: Lori Hernandez MD    Expected Discharge Date: 12/8/2022    Reassessment (most recent)       Discharge Reassessment - 12/05/22 1254          Discharge Reassessment    Assessment Type Discharge Planning Reassessment (P)      Did the patient's condition or plan change since previous assessment? No (P)      Communicated ANTHONY with patient/caregiver Date not available/Unable to determine (P)      Discharge Plan A Skilled Nursing Facility (P)      Discharge Plan B Skilled Nursing Facility (P)      DME Needed Upon Discharge  other (see comments) (P)    TBD    Discharge Barriers Identified None (P)      Why the patient remains in the hospital Requires continued medical care (P)                    Pt is not medically ready to transfer to SNF at this time. Pt is being followed by [facility].    Cailin Ridley LCSW   PRN

## 2022-12-05 NOTE — PLAN OF CARE
Problem: Occupational Therapy  Goal: Occupational Therapy Goal  Description: Goals to be met by: 12/15     Patient will increase functional independence with ADLs by performing:    UE Dressing with Modified [place].  LE Dressing with Modified [place].  Grooming while standing with Modified [place].  Toileting from toilet with Modified [place] for hygiene and clothing management.   Supine to sit with Modified [place].  Step transfer with Modified [place]  Toilet transfer to toilet with Modified [place].  Pt will demonstrate independence with theraputty HEP.    Outcome: Ongoing, Progressing     Goals remain appropriate

## 2022-12-05 NOTE — PT/OT/SLP PROGRESS
"Physical Therapy Treatment    Patient Name:  Kristin Goodman   MRN:  [id number]    Recommendations:     Discharge Recommendations: nursing facility, skilled  Discharge Equipment Recommendations: bedside commode, walker, rolling  Barriers to discharge:  Pt currently requiring increased level of assistance with mobility    Assessment:     Kristin Goodman is a 77 y.o. female admitted with a medical diagnosis of Microscopic polyangiitis.  She presents with the following impairments/functional limitations: weakness, impaired endurance, impaired self care skills, impaired functional mobility, impaired balance, impaired cardiopulmonary response to activity, decreased upper extremity function, decreased lower extremity function. Pt was foundon toilet in bathroom with her daughter and both agreed to therapy today. Pt was able to participate in gait training w/ RW @ SBA. Pt safely t/f to bedisde chair and performed therapeutic exercises. Pt will cont to benefit from skilled PT to improve endurance and strenght to perform functional tasks.    Rehab Prognosis: Good; patient would benefit from acute skilled PT services to address these deficits and reach maximum level of function.    Recent Surgery: Procedure(s) (LRB):  EGD (ESOPHAGOGASTRODUODENOSCOPY) (N/A) 21 Days Post-Op    Plan:     During this hospitalization, patient to be seen 3 x/week to address the identified rehab impairments via gait training, therapeutic activities, therapeutic exercises, neuromuscular re-education and progress toward the following goals:    Plan of Care Expires:  12/28/22    Subjective     Chief Complaint: None  Patient/Family Comments/goals: "Don't over work me now"  Pain/Comfort:  Pain Rating 1: 0/10      Objective:     Communicated with nurse prior to session.  Patient found  on toilet in bathroom  with telemetry upon PT entry to room.     General Precautions: Standard, fall, aspiration  Orthopedic Precautions: N/A  Braces: N/A  Respiratory Status: " Room air     Functional Mobility:  Transfers:     Sit to Stand:  minimum assistance with rolling walker  Bed to Chair: contact guard assistance with  rolling walker  using  Step Transfer  Toilet Transfer: minimum assistance with  rolling walker  using  Step Transfer  Gait: 16 ft x 2 with RW slow obey @ SBA   Balance: Good dynamic balance with RW      AM-PAC 6 CLICK MOBILITY  Turning over in bed (including adjusting bedclothes, sheets and blankets)?: 4  Sitting down on and standing up from a chair with arms (e.g., wheelchair, bedside commode, etc.): 3  Moving from lying on back to sitting on the side of the bed?: 3  Moving to and from a bed to a chair (including a wheelchair)?: 3  Need to walk in hospital room?: 4  Climbing 3-5 steps with a railing?:  (not done)       Treatment & Education:  Therapeutic exercises: Marching, LAQ's, and heel/toe raises x 10 reps  Pt was educated on importance to perform OOB activities.    Patient left up in chair with call button in reach, nurse notified, and daughter present..    GOALS:   Multidisciplinary Problems       Physical Therapy Goals          Problem: Physical Therapy    Goal Priority Disciplines Outcome Goal Variances Interventions   Physical Therapy Goal     PT, PT/OT Ongoing, Progressing     Description: Goals remain appropriate and extended to: 2022     Patient will increase functional independence with mobility by performin. Supine to sit with contact guard assistance  2. Sit to supine with contact guard assistance MET   2a. Sit to supine independently  3. Sit to stand transfer with minimum assistance MET   3a. STS supvn LRAD  4. Gait  x 40 feet with minimum assistance using LRAD as needed  5. Lower extremity exercise program x10 reps per handout, with independence                        Time Tracking:     PT Received On: 22  PT Start Time: 849     PT Stop Time: 901  PT Total Time (min): 12 min     Billable Minutes: Gait Training  12    Treatment Type: Treatment  PT/PTA: PTA     PTA Visit Number: 1     12/05/2022

## 2022-12-05 NOTE — SUBJECTIVE & OBJECTIVE
This encounter was provided through telemedicine.  Patient was transferred to the telemedicine service on:  12/03/2022   The patient location is: [id number] A admitted 10/17/2022 10:12 AM.    Interval History/Overnight Events:     Patient able to provide adequate history - daughter at bedside.    Decreased edema to LE; feels much better today without dizziness or fatigue; up in the chair; ate breakfast - BP stable; 1 BM after AM meds which was soft    Labs reviewed - plt 117      Review of Systems   Constitutional:  Positive for activity change, appetite change and fatigue. Negative for fever.   Respiratory:  Negative for shortness of breath.    Cardiovascular:  Positive for leg swelling.   Gastrointestinal:  Negative for abdominal pain, diarrhea, nausea and vomiting.   Musculoskeletal:  Negative for arthralgias.      Inpatient Medications:  Scheduled Meds:   sodium chloride 0.9%   Intravenous Once    amLODIPine  5 mg Oral Daily    [START ON 12/6/2022] atovaquone  1,500 mg Oral Daily    carvediloL  12.5 mg Oral BID    epoetin hira (PROCRIT) injection  50 Units/kg Subcutaneous Every Mon, Wed, Fri    hydrALAZINE  50 mg Oral Q8H    levothyroxine  25 mcg Oral Before breakfast    pantoprazole  40 mg Oral BID AC    predniSONE  15 mg Oral with lunch    Followed by    [START ON 12/18/2022] predniSONE  12.5 mg Oral with lunch    Followed by    [START ON 1/1/2023] predniSONE  10 mg Oral with lunch    [START ON 1/15/2023] predniSONE  5 mg Oral Daily    QUEtiapine  12.5 mg Oral QHS    sodium chloride 0.9% flush bag IVPB   Intravenous 1 time in Clinic/HOD     Continuous Infusions:  PRN Meds:.acetaminophen, albuterol-ipratropium, aluminum-magnesium hydroxide-simethicone, bisacodyL, cloNIDine, dextrose 10%, dextrose 10%, glucagon (human recombinant), glucose, glucose, heparin (porcine), heparin (porcine), heparin (porcine), heparin, porcine (PF), heparin, porcine (PF), insulin aspart U-100, melatonin, methocarbamoL,  I have reviewed discharge instructions with the parent. The parent verbalized understanding. Patient armband removed and shredded metoclopramide HCl, naloxone, ondansetron, prochlorperazine, simethicone, sodium chloride 0.9%, sodium chloride 0.9%, sodium chloride 0.9%, sodium chloride 0.9%, sodium chloride 0.9%, sodium chloride 0.9%, sucralfate      Objective:     Temp:  [96.4 °F (35.8 °C)-99.7 °F (37.6 °C)] 98.3 °F (36.8 °C)  Pulse:  [67-74] 69  Resp:  [17-18] 17  SpO2:  [93 %-96 %] 94 %  BP: (118-136)/(57-63) 131/58    No intake or output data in the 24 hours ending 12/05/22 1055       Body mass index is 28.53 kg/m².    Physical Exam  Vitals and nursing note reviewed.   Constitutional:       General: She is not in acute distress.     Appearance: Normal appearance.   HENT:      Head: Normocephalic and atraumatic.   Eyes:      General: No scleral icterus.        Right eye: No discharge.         Left eye: No discharge.      Extraocular Movements: Extraocular movements intact.   Cardiovascular:      Rate and Rhythm: Normal rate.   Pulmonary:      Effort: Pulmonary effort is normal. No tachypnea or respiratory distress.   Musculoskeletal:      Right lower leg: Edema present.      Left lower leg: Edema present.   Neurological:      General: No focal deficit present.      Mental Status: She is alert and oriented to person, place, and time.      Cranial Nerves: No cranial nerve deficit.      Motor: No weakness.   Psychiatric:         Mood and Affect: Mood and affect normal.         Speech: Speech normal.         Behavior: Behavior is cooperative.         Thought Content: Thought content normal.        Labs:  Recent Results (from the past 24 hour(s))   POCT glucose    Collection Time: 12/04/22 11:40 AM   Result Value Ref Range    POCT Glucose 79 70 - 110 mg/dL   POCT glucose    Collection Time: 12/04/22  4:08 PM   Result Value Ref Range    POCT Glucose 93 70 - 110 mg/dL   POCT glucose    Collection Time: 12/04/22  7:16 PM   Result Value Ref Range    POCT Glucose 92 70 - 110 mg/dL   Magnesium    Collection Time: 12/05/22  4:48 AM   Result Value Ref  Range    Magnesium 1.9 1.6 - 2.6 mg/dL   Phosphorus    Collection Time: 12/05/22  4:48 AM   Result Value Ref Range    Phosphorus 4.7 (H) 2.7 - 4.5 mg/dL   CBC Auto Differential    Collection Time: 12/05/22  4:48 AM   Result Value Ref Range    WBC 9.44 3.90 - 12.70 K/uL    RBC 2.86 (L) 4.00 - 5.40 M/uL    Hemoglobin 8.3 (L) 12.0 - 16.0 g/dL    Hematocrit 26.9 (L) 37.0 - 48.5 %    MCV 94 82 - 98 fL    MCH 29.0 27.0 - 31.0 pg    MCHC 30.9 (L) 32.0 - 36.0 g/dL    RDW 15.0 (H) 11.5 - 14.5 %    Platelets 117 (L) 150 - 450 K/uL    MPV 11.4 9.2 - 12.9 fL    Immature Granulocytes 1.2 (H) 0.0 - 0.5 %    Gran # (ANC) 6.8 1.8 - 7.7 K/uL    Immature Grans (Abs) 0.11 (H) 0.00 - 0.04 K/uL    Lymph # 1.9 1.0 - 4.8 K/uL    Mono # 0.6 0.3 - 1.0 K/uL    Eos # 0.0 0.0 - 0.5 K/uL    Baso # 0.02 0.00 - 0.20 K/uL    nRBC 0 0 /100 WBC    Gran % 71.8 38.0 - 73.0 %    Lymph % 20.3 18.0 - 48.0 %    Mono % 6.5 4.0 - 15.0 %    Eosinophil % 0.0 0.0 - 8.0 %    Basophil % 0.2 0.0 - 1.9 %    Differential Method Automated    Basic Metabolic Panel    Collection Time: 12/05/22  4:48 AM   Result Value Ref Range    Sodium 140 136 - 145 mmol/L    Potassium 4.2 3.5 - 5.1 mmol/L    Chloride 104 95 - 110 mmol/L    CO2 22 (L) 23 - 29 mmol/L    Glucose 63 (L) 70 - 110 mg/dL    BUN 45 (H) 8 - 23 mg/dL    Creatinine 6.4 (H) 0.5 - 1.4 mg/dL    Calcium 7.7 (L) 8.7 - 10.5 mg/dL    Anion Gap 14 8 - 16 mmol/L    eGFR 6.3 (A) >60 mL/min/1.73 m^2   POCT glucose    Collection Time: 12/05/22  6:55 AM   Result Value Ref Range    POCT Glucose 67 (L) 70 - 110 mg/dL        Lab Results   Component Value Date    [id number] Not Detected 10/24/2022       Recent Labs   Lab 12/03/22 0313 12/04/22 0243 12/05/22 0448   WBC 11.67 10.27 9.44   LYMPH 15.0*  1.8 19.2  2.0 20.3  1.9   HGB 8.6* 8.5* 8.3*   HCT 28.0* 26.3* 26.9*   * 98* 117*       Recent Labs   Lab 12/03/22 0312 12/04/22 0243 12/05/22 0448   * 140 140   K 4.3 4.4 4.2   CL 96 104 104   CO2 24 22*  22*   BUN 71* 38* 45*   CREATININE 8.3* 5.8* 6.4*   GLU 68* 55* 63*   CALCIUM 7.8* 7.4* 7.7*   MG 1.9 1.9 1.9   PHOS 5.4* 4.5 4.7*       No results for input(s): ALKPHOS, ALT, AST, ALBUMIN, PROT, BILITOT, INR in the last 168 hours.       No results for input(s): DDIMER, FERRITIN, CRP, LDH, BNP, TROPONINI, CPK in the last 72 hours.    Invalid input(s): PROCALCITONIN    All labs within the last 24 hours were reviewed.     Microbiology:  Microbiology Results (last 7 days)       ** No results found for the last 168 hours. **              Imaging  ECG Results              EKG 12-lead (Final result)  Result time 10/17/22 14:26:15      Final result by Interface, Lab In [facility] (10/17/22 14:26:15)                   Narrative:    Test Reason : R06.02,    Vent. Rate : 093 BPM     Atrial Rate : 093 BPM     P-R Int : 126 ms          QRS Dur : 070 ms      QT Int : 356 ms       P-R-T Axes : 048 052 030 degrees     QTc Int : 442 ms    Normal sinus rhythm  Normal ECG  When compared with ECG of 14-OCT-2022 14:26,  Limb lead reversal has been corrected  Confirmed by Jc Barbosa MD ([id number]) on 10/17/2022 2:26:04 PM    Referred By: [email or web address]   SELF           Confirmed By:Jc Barbosa MD                                    Results for orders placed during the hospital encounter of 10/17/22    Echo    Interpretation Summary  · The left ventricle is normal in size with normal systolic function. The estimated ejection fraction is 65%.  · Normal right ventricular size with normal right ventricular systolic function.  · Grade I left ventricular diastolic dysfunction.  · Mild tricuspid regurgitation.  · There is pulmonary hypertension. The estimated PA systolic pressure is 56 mmHg.  · Normal to low central venous pressure (3 mmHg).      X-Ray Abdomen AP 1 View  Narrative: EXAMINATION:  XR ABDOMEN AP 1 VIEW    CLINICAL HISTORY:  NGT placement; Dysphagia, unspecified    TECHNIQUE:  AP View(s) of the abdomen was performed.    COMPARISON:  No  11/20/2022 ne    FINDINGS:  Feeding tube in the stomach.  No significant bowel dilatation.  Impression: See above    Electronically signed by: Ej Strickland MD  Date:    11/14/2022  Time:    11:02      All imaging within the last 24 hours was reviewed.       Discharge Planning   ANTHONY: 12/8/2022     Code Status: Full Code   Is the patient medically ready for discharge?: No    Reason for patient still in hospital (select all that apply): Patient trending condition, Laboratory test, Treatment, Consult recommendations, and Pending disposition  Discharge Plan A: Skilled Nursing Facility   Discharge Delays: None known at this time

## 2022-12-06 LAB
ANION GAP SERPL CALC-SCNC: 12 MMOL/L (ref 8–16)
BASOPHILS # BLD AUTO: 0.01 K/UL (ref 0–0.2)
BASOPHILS NFR BLD: 0.1 % (ref 0–1.9)
BUN SERPL-MCNC: 48 MG/DL (ref 8–23)
CALCIUM SERPL-MCNC: 7.7 MG/DL (ref 8.7–10.5)
CHLORIDE SERPL-SCNC: 103 MMOL/L (ref 95–110)
CO2 SERPL-SCNC: 23 MMOL/L (ref 23–29)
CREAT SERPL-MCNC: 7.2 MG/DL (ref 0.5–1.4)
DIFFERENTIAL METHOD: ABNORMAL
EOSINOPHIL # BLD AUTO: 0 K/UL (ref 0–0.5)
EOSINOPHIL NFR BLD: 0 % (ref 0–8)
ERYTHROCYTE [DISTWIDTH] IN BLOOD BY AUTOMATED COUNT: 15 % (ref 11.5–14.5)
EST. GFR  (NO RACE VARIABLE): 5.5 ML/MIN/1.73 M^2
GLUCOSE SERPL-MCNC: 74 MG/DL (ref 70–110)
HCT VFR BLD AUTO: 25.6 % (ref 37–48.5)
HGB BLD-MCNC: 7.9 G/DL (ref 12–16)
IMM GRANULOCYTES # BLD AUTO: 0.14 K/UL (ref 0–0.04)
IMM GRANULOCYTES NFR BLD AUTO: 1.6 % (ref 0–0.5)
LYMPHOCYTES # BLD AUTO: 2 K/UL (ref 1–4.8)
LYMPHOCYTES NFR BLD: 22.8 % (ref 18–48)
MAGNESIUM SERPL-MCNC: 2 MG/DL (ref 1.6–2.6)
MCH RBC QN AUTO: 28.7 PG (ref 27–31)
MCHC RBC AUTO-ENTMCNC: 30.9 G/DL (ref 32–36)
MCV RBC AUTO: 93 FL (ref 82–98)
MONOCYTES # BLD AUTO: 0.5 K/UL (ref 0.3–1)
MONOCYTES NFR BLD: 6.1 % (ref 4–15)
NEUTROPHILS # BLD AUTO: 6.2 K/UL (ref 1.8–7.7)
NEUTROPHILS NFR BLD: 69.4 % (ref 38–73)
NRBC BLD-RTO: 0 /100 WBC
PHOSPHATE SERPL-MCNC: 4.9 MG/DL (ref 2.7–4.5)
PLATELET # BLD AUTO: 121 K/UL (ref 150–450)
PMV BLD AUTO: 11.3 FL (ref 9.2–12.9)
POCT GLUCOSE: 100 MG/DL (ref 70–110)
POCT GLUCOSE: 190 MG/DL (ref 70–110)
POCT GLUCOSE: 79 MG/DL (ref 70–110)
POTASSIUM SERPL-SCNC: 4.6 MMOL/L (ref 3.5–5.1)
RBC # BLD AUTO: 2.75 M/UL (ref 4–5.4)
SODIUM SERPL-SCNC: 138 MMOL/L (ref 136–145)
WBC # BLD AUTO: 8.87 K/UL (ref 3.9–12.7)

## 2022-12-06 PROCEDURE — 99232 PR SUBSEQUENT HOSPITAL CARE,LEVL II: ICD-10-PCS | Mod: ,,, | Performed by: INTERNAL MEDICINE

## 2022-12-06 PROCEDURE — 84100 ASSAY OF PHOSPHORUS: CPT

## 2022-12-06 PROCEDURE — 25000003 PHARM REV CODE 250: Performed by: INTERNAL MEDICINE

## 2022-12-06 PROCEDURE — 97110 THERAPEUTIC EXERCISES: CPT | Mod: CQ

## 2022-12-06 PROCEDURE — 99232 SBSQ HOSP IP/OBS MODERATE 35: CPT | Mod: ,,, | Performed by: INTERNAL MEDICINE

## 2022-12-06 PROCEDURE — 63600175 PHARM REV CODE 636 W HCPCS: Performed by: INTERNAL MEDICINE

## 2022-12-06 PROCEDURE — 83735 ASSAY OF MAGNESIUM: CPT

## 2022-12-06 PROCEDURE — 85025 COMPLETE CBC W/AUTO DIFF WBC: CPT | Performed by: STUDENT IN AN ORGANIZED HEALTH CARE EDUCATION/TRAINING PROGRAM

## 2022-12-06 PROCEDURE — 80048 BASIC METABOLIC PNL TOTAL CA: CPT | Performed by: STUDENT IN AN ORGANIZED HEALTH CARE EDUCATION/TRAINING PROGRAM

## 2022-12-06 PROCEDURE — 94761 N-INVAS EAR/PLS OXIMETRY MLT: CPT

## 2022-12-06 PROCEDURE — 25000003 PHARM REV CODE 250: Performed by: HOSPITALIST

## 2022-12-06 PROCEDURE — 20600001 HC STEP DOWN PRIVATE ROOM

## 2022-12-06 PROCEDURE — 99233 PR SUBSEQUENT HOSPITAL CARE,LEVL III: ICD-10-PCS | Mod: 95,,, | Performed by: INTERNAL MEDICINE

## 2022-12-06 PROCEDURE — 99233 SBSQ HOSP IP/OBS HIGH 50: CPT | Mod: 95,,, | Performed by: INTERNAL MEDICINE

## 2022-12-06 PROCEDURE — 36415 COLL VENOUS BLD VENIPUNCTURE: CPT

## 2022-12-06 RX ADMIN — PREDNISONE 15 MG: 5 TABLET ORAL at 11:12

## 2022-12-06 RX ADMIN — ATOVAQUONE 1500 MG: 750 SUSPENSION ORAL at 09:12

## 2022-12-06 RX ADMIN — CARVEDILOL 12.5 MG: 12.5 TABLET, FILM COATED ORAL at 08:12

## 2022-12-06 RX ADMIN — HYDRALAZINE HYDROCHLORIDE 50 MG: 50 TABLET ORAL at 09:12

## 2022-12-06 RX ADMIN — CARVEDILOL 12.5 MG: 12.5 TABLET, FILM COATED ORAL at 09:12

## 2022-12-06 RX ADMIN — WHITE PETROLATUM: 1.75 OINTMENT TOPICAL at 09:12

## 2022-12-06 RX ADMIN — PANTOPRAZOLE SODIUM 40 MG: 40 TABLET, DELAYED RELEASE ORAL at 02:12

## 2022-12-06 RX ADMIN — HYDRALAZINE HYDROCHLORIDE 50 MG: 50 TABLET ORAL at 02:12

## 2022-12-06 RX ADMIN — HYDRALAZINE HYDROCHLORIDE 50 MG: 50 TABLET ORAL at 05:12

## 2022-12-06 RX ADMIN — AMLODIPINE BESYLATE 5 MG: 5 TABLET ORAL at 09:12

## 2022-12-06 RX ADMIN — PANTOPRAZOLE SODIUM 40 MG: 40 TABLET, DELAYED RELEASE ORAL at 05:12

## 2022-12-06 RX ADMIN — QUETIAPINE FUMARATE 12.5 MG: 25 TABLET ORAL at 08:12

## 2022-12-06 RX ADMIN — LEVOTHYROXINE SODIUM 25 MCG: 25 TABLET ORAL at 05:12

## 2022-12-06 NOTE — SUBJECTIVE & OBJECTIVE
This encounter was provided through telemedicine.  Patient was transferred to the telemedicine service on:  12/03/2022   The patient location is: [id number] A admitted 10/17/2022 10:12 AM.    Interval History/Overnight Events:     Patient able to provide adequate history - daughter at bedside.    Feeling better today - up in chair; ate breakfast and had no diarrhea this morning; nephrology has decided to pursue outpatient dialysis and patient in agreement    Labs reviewed - plt 121    Review of Systems   Constitutional:  Positive for activity change, appetite change and fatigue. Negative for fever.   Respiratory:  Negative for shortness of breath.    Cardiovascular:  Positive for leg swelling.   Gastrointestinal:  Negative for abdominal pain, diarrhea, nausea and vomiting.   Musculoskeletal:  Negative for arthralgias.      Inpatient Medications:  Scheduled Meds:   sodium chloride 0.9%   Intravenous Once    amLODIPine  5 mg Oral Daily    atovaquone  1,500 mg Oral Daily    carvediloL  12.5 mg Oral BID    epoetin hira (PROCRIT) injection  50 Units/kg Subcutaneous Every Mon, Wed, Fri    hydrALAZINE  50 mg Oral Q8H    levothyroxine  25 mcg Oral Before breakfast    pantoprazole  40 mg Oral BID AC    predniSONE  15 mg Oral with lunch    Followed by    [START ON 12/18/2022] predniSONE  12.5 mg Oral with lunch    Followed by    [START ON 1/1/2023] predniSONE  10 mg Oral with lunch    [START ON 1/15/2023] predniSONE  5 mg Oral Daily    QUEtiapine  12.5 mg Oral QHS    sodium chloride 0.9% flush bag IVPB   Intravenous 1 time in Clinic/HOD    white petrolatum   Topical (Top) Daily     Continuous Infusions:  PRN Meds:.acetaminophen, albuterol-ipratropium, aluminum-magnesium hydroxide-simethicone, bisacodyL, cloNIDine, dextrose 10%, dextrose 10%, glucagon (human recombinant), glucose, glucose, heparin (porcine), heparin (porcine), heparin (porcine), heparin, porcine (PF), heparin, porcine (PF), insulin aspart U-100, melatonin,  methocarbamoL, metoclopramide HCl, naloxone, ondansetron, prochlorperazine, simethicone, sodium chloride 0.9%, sodium chloride 0.9%, sodium chloride 0.9%, sodium chloride 0.9%, sodium chloride 0.9%, sodium chloride 0.9%, sucralfate      Objective:     Temp:  [96.9 °F (36.1 °C)-98.7 °F (37.1 °C)] 96.9 °F (36.1 °C)  Pulse:  [69-83] 71  Resp:  [18] 18  SpO2:  [95 %-100 %] 100 %  BP: (127-150)/(60-65) 127/60    No intake or output data in the 24 hours ending 12/06/22 1628       Body mass index is 27.91 kg/m².    Physical Exam  Vitals and nursing note reviewed.   Constitutional:       General: She is not in acute distress.     Appearance: Normal appearance.   HENT:      Head: Normocephalic and atraumatic.   Eyes:      General: No scleral icterus.        Right eye: No discharge.         Left eye: No discharge.      Extraocular Movements: Extraocular movements intact.   Cardiovascular:      Rate and Rhythm: Normal rate.   Pulmonary:      Effort: Pulmonary effort is normal. No tachypnea or respiratory distress.   Musculoskeletal:      Right lower leg: Edema present.      Left lower leg: Edema present.   Neurological:      General: No focal deficit present.      Mental Status: She is alert and oriented to person, place, and time.      Cranial Nerves: No cranial nerve deficit.      Motor: No weakness.   Psychiatric:         Mood and Affect: Mood and affect normal.         Speech: Speech normal.         Behavior: Behavior is cooperative.         Thought Content: Thought content normal.        Labs:  Recent Results (from the past 24 hour(s))   POCT glucose    Collection Time: 12/05/22  9:06 PM   Result Value Ref Range    POCT Glucose 128 (H) 70 - 110 mg/dL   Magnesium    Collection Time: 12/06/22  2:04 AM   Result Value Ref Range    Magnesium 2.0 1.6 - 2.6 mg/dL   Phosphorus    Collection Time: 12/06/22  2:04 AM   Result Value Ref Range    Phosphorus 4.9 (H) 2.7 - 4.5 mg/dL   CBC Auto Differential    Collection Time: 12/06/22   2:04 AM   Result Value Ref Range    WBC 8.87 3.90 - 12.70 K/uL    RBC 2.75 (L) 4.00 - 5.40 M/uL    Hemoglobin 7.9 (L) 12.0 - 16.0 g/dL    Hematocrit 25.6 (L) 37.0 - 48.5 %    MCV 93 82 - 98 fL    MCH 28.7 27.0 - 31.0 pg    MCHC 30.9 (L) 32.0 - 36.0 g/dL    RDW 15.0 (H) 11.5 - 14.5 %    Platelets 121 (L) 150 - 450 K/uL    MPV 11.3 9.2 - 12.9 fL    Immature Granulocytes 1.6 (H) 0.0 - 0.5 %    Gran # (ANC) 6.2 1.8 - 7.7 K/uL    Immature Grans (Abs) 0.14 (H) 0.00 - 0.04 K/uL    Lymph # 2.0 1.0 - 4.8 K/uL    Mono # 0.5 0.3 - 1.0 K/uL    Eos # 0.0 0.0 - 0.5 K/uL    Baso # 0.01 0.00 - 0.20 K/uL    nRBC 0 0 /100 WBC    Gran % 69.4 38.0 - 73.0 %    Lymph % 22.8 18.0 - 48.0 %    Mono % 6.1 4.0 - 15.0 %    Eosinophil % 0.0 0.0 - 8.0 %    Basophil % 0.1 0.0 - 1.9 %    Differential Method Automated    Basic Metabolic Panel    Collection Time: 12/06/22  2:04 AM   Result Value Ref Range    Sodium 138 136 - 145 mmol/L    Potassium 4.6 3.5 - 5.1 mmol/L    Chloride 103 95 - 110 mmol/L    CO2 23 23 - 29 mmol/L    Glucose 74 70 - 110 mg/dL    BUN 48 (H) 8 - 23 mg/dL    Creatinine 7.2 (H) 0.5 - 1.4 mg/dL    Calcium 7.7 (L) 8.7 - 10.5 mg/dL    Anion Gap 12 8 - 16 mmol/L    eGFR 5.5 (A) >60 mL/min/1.73 m^2   POCT glucose    Collection Time: 12/06/22  7:48 AM   Result Value Ref Range    POCT Glucose 79 70 - 110 mg/dL   POCT glucose    Collection Time: 12/06/22 12:30 PM   Result Value Ref Range    POCT Glucose 100 70 - 110 mg/dL        Lab Results   Component Value Date    [id number] Not Detected 10/24/2022       Recent Labs   Lab 12/04/22 0243 12/05/22 0448 12/06/22  0204   WBC 10.27 9.44 8.87   LYMPH 19.2  2.0 20.3  1.9 22.8  2.0   HGB 8.5* 8.3* 7.9*   HCT 26.3* 26.9* 25.6*   PLT 98* 117* 121*       Recent Labs   Lab 12/04/22  0243 12/05/22  0448 12/06/22  0204    140 138   K 4.4 4.2 4.6    104 103   CO2 22* 22* 23   BUN 38* 45* 48*   CREATININE 5.8* 6.4* 7.2*   GLU 55* 63* 74   CALCIUM 7.4* 7.7* 7.7*   MG 1.9 1.9 2.0    PHOS 4.5 4.7* 4.9*       No results for input(s): ALKPHOS, ALT, AST, ALBUMIN, PROT, BILITOT, INR in the last 168 hours.       No results for input(s): DDIMER, FERRITIN, CRP, LDH, BNP, TROPONINI, CPK in the last 72 hours.    Invalid input(s): PROCALCITONIN    All labs within the last 24 hours were reviewed.     Microbiology:  Microbiology Results (last 7 days)       ** No results found for the last 168 hours. **              Imaging  ECG Results              EKG 12-lead (Final result)  Result time 10/17/22 14:26:15      Final result by Interface, Lab In [facility] (10/17/22 14:26:15)                   Narrative:    Test Reason : R06.02,    Vent. Rate : 093 BPM     Atrial Rate : 093 BPM     P-R Int : 126 ms          QRS Dur : 070 ms      QT Int : 356 ms       P-R-T Axes : 048 052 030 degrees     QTc Int : 442 ms    Normal sinus rhythm  Normal ECG  When compared with ECG of 14-OCT-2022 14:26,  Limb lead reversal has been corrected  Confirmed by Jc Barbosa MD ([id number]) on 10/17/2022 2:26:04 PM    Referred By: [email or web address]   SELF           Confirmed By:Jc Barbosa MD                                    Results for orders placed during the hospital encounter of 10/17/22    Echo    Interpretation Summary  · The left ventricle is normal in size with normal systolic function. The estimated ejection fraction is 65%.  · Normal right ventricular size with normal right ventricular systolic function.  · Grade I left ventricular diastolic dysfunction.  · Mild tricuspid regurgitation.  · There is pulmonary hypertension. The estimated PA systolic pressure is 56 mmHg.  · Normal to low central venous pressure (3 mmHg).      X-Ray Abdomen AP 1 View  Narrative: EXAMINATION:  XR ABDOMEN AP 1 VIEW    CLINICAL HISTORY:  NGT placement; Dysphagia, unspecified    TECHNIQUE:  AP View(s) of the abdomen was performed.    COMPARISON:  No 11/20/2022 ne    FINDINGS:  Feeding tube in the stomach.  No significant bowel dilatation.  Impression: See  above    Electronically signed by: Ej Strickland MD  Date:    11/14/2022  Time:    11:02      All imaging within the last 24 hours was reviewed.       Discharge Planning   ANTHONY: 12/8/2022     Code Status: Full Code   Is the patient medically ready for discharge?: No    Reason for patient still in hospital (select all that apply): Patient trending condition, Laboratory test, Treatment, Consult recommendations, and Pending disposition  Discharge Plan A: Skilled Nursing Facility   Discharge Delays: None known at this time

## 2022-12-06 NOTE — PLAN OF CARE
Problem: Adult Inpatient Plan of Care  Goal: Plan of Care Review  Outcome: Ongoing, Progressing  Goal: Absence of Hospital-Acquired Illness or Injury  Outcome: Ongoing, Progressing  Goal: Readiness for Transition of Care  Outcome: Ongoing, Progressing     Problem: Infection  Goal: Absence of Infection Signs and Symptoms  Outcome: Ongoing, Progressing

## 2022-12-06 NOTE — ASSESSMENT & PLAN NOTE
Due to microscopic polyangiitis. Remains on hemodialysis intermittently.   - Baseline creatinine 1.0-1.2.   - New onset proteinuria/hematuria.   - Renal biopsy completed and   - Trialysis in place for intermittent Hemodialysis    Plan  - Nephrology consulted, appreciate management  - management of MPA as noted separately  - Renal function panel daily  - renally dose all medications  - intermittent Hemodialysis as indicated, per Nephrology--> have decided to pursue outpatient HD; IR consulted for tunneled catheter placement     Recent Labs   Lab 12/04/22  0243 12/05/22  0448 12/06/22  0204   BUN 38* 45* 48*   CREATININE 5.8* 6.4* 7.2*

## 2022-12-06 NOTE — NURSING
Patient A/OX4. GCS 15. VSS. Afebrile. Up to toilet w/ assist. BM during shift. Meds admin per MAR. Daughter updated at bedside. Denies pain.  Up to chair for meals. Will hand off to on coming RN.

## 2022-12-06 NOTE — PROGRESS NOTES
[facility] - Intensive [facility] ([place] [id number])  [facility]  Telemedicine Progress Note    Patient Name: Kristin Goodman  MRN: [id number]  Patient Class: IP- Inpatient   Admission Date: 10/17/2022  Length of Stay: 50 days  Attending Physician: Haleigh Parks MD  Primary Care Provider: Lori Hernandez MD          Subjective:     Principal Problem:Microscopic polyangiitis        HPI:  Kristin Goodman is a 77 y.o. female with a past medical history of HTN, hypothyroidism, HFpEF, and osteoarthritis of the arm who has presented to the ED for cough, SOB, and weakness. Daughter is present at the bedside. Patient presented to the ED on 9/24 with hemoptysis, CT and CXR showed high suspicion for multilobar pneumonia. Patient was discharged with a 10-day course of levofloxacin and an albuterol inhaler. Patient followed up with her PCP on 10/4 with slight improvement in symptoms and went back to work. Patient continued to have worsening symptoms and presented to the ED again on 10/14 for evaluation and no interventions were done. Patient endorses fevers over the last few days up to 102.4 F with progressive SOB. She endorses hemoptysis with moderate amount of blood, generalized weakness, productive cough, SOB, and loss of appetite. Denies chest pain, nausea, vomiting, abdominal pain, or urinary changes.    ED: hypertensive up to 219/93 and tachycardic up to 117. Oxygen saturation on 92% on RA, placed on 5L NC with sats >97%. CBC remarkable for leukocytosis of 16.03 and Hb 7.7. K 3.3. Cr 1.6, baseline ~1.0. . Troponin 0.061. COVID and flu negative. EKG NSR. CT chest with contrast pending at time of admission. Given home amlodipine and lisinopril. Given 500mL NS, IV azithromycin, cefepime and vanc.       Overview/Hospital Course:  HM Course:  Ms. Goodman was admitted to Hospital Medicine for management of multifocal pneumonia and acute hypoxemic respiratory failure after presenting to the ED with fevers, cough,  hemoptysis, and dyspnea. She required escalation to the MICU due to abrupt decline in her respiratory status, associated with hemoptysis and requiring NIPPV. CT chest showed bilateral patchy ground glass opacities. Labs additionally were notable for acute renal failure on CKD3. Pulmonology was consulted while under the care of [facility] and felt this picture was consistent with diffuse alveolar hemorrhage.     ICU Course:   Her workup revealed a strongly positive MPO Ab consistent with clinical picture of microscopic polyangiitis with pulmonary and renal involvement. She underwent Trialysis catheter placement 10/25/2022 in the Medical ICU. 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     Rheumatology was consulted, extensive workup revealed MITUL + 1:2560 homogenous, +dsDNA, normal complements, PR3 1.2 (slight +),  (+), cANCA + 1:80, pANCA neg, GBMAb neg, trace cryos. Due to concern for MPA, she was started on pulse dose IV methylprednisolone 1000mg for 3 days, and plasmapheresis under the guidance of Transfusion Medicine. Patient was placed on steroid taper and completed PLEX. She additionally received rituximab and cyclophosphamide. OI prophylaxis was provided with atovaquone due to a sulfa allergy.     Nephrology was consulted for evaluation of acute renal failure in the setting of MPA. She did require SLED in the ICU for renal clearance and remains on intermittent hemodialysis. She is expected to continue HD once discharged.     Her acute hypoxemic respiratory failure improved and she was weaned off of supplemental oxygen. She stepped down to [facility] 10/28.     HM Course:  She underwent MBBS for evaluation of dysphagia, which showed global delayed  initiation of swallow and aspiration with thin liquids. Due to concern for possible prior stroke, head imaging was completed and negative for acute finding. She was NPO with NG tube. ENT was consulted for evaluation of dysphagia and cervical adenopathy.  Patient did not tolerate laryngoscopy; unable to visualize vocal cords but given her lack of hoarseness, they did not suspect vocal fold paresis/paralysis. MRI recommended by rheum to fully rule out any potential neurological cause of the patient's dysphagia; MRI demonstrated remote left thalamic lacunar type infarct and punctate remote left cerebellar infarcts.  She continued to work with speech therapy.  EGD completed 11/14 without abnormalities.  Per GI, Suspect some aspect of esophageal dysmotility in setting of critical illness. No further testing at this time.  Per SLP, diet advanced on 11/15 and NG tube removed.    Her other chronic medical conditions including hypothyroidism, diastolic CHF, and hypertension were managed with her home medications, with dose adjustments as needed.     Patient was noted to have downtrending Hg 11/17- required 1u pRBC. Rheum continued to follow for further steroid dosing. Patient was started on 2g NaCl tab TID for SIADH. SLP recommended mechanical soft diet with thin liquids 11/17. Na normalized 11/18. Hg improved to 8.5 following 1u pRBC. Patient had some hyperkalemia- started on scheduled lokelma. Patient continued to have improved UOP; however, BUN: Cr ratio uptrended. Nephro decided to hang off on HD as patient was having 500-600mL/24hr. He was started on scheduled NaHCO3 as ewll. Patient underwent HD 11/23. Patient had drop in Hg once again- 6.5 on 11/25. Given 1u pRBC (2nd unit). Underwent HD 11/30. Nephrology following to re-evaluate daily for need for HD and permacath placement.         This encounter was provided through telemedicine.  Patient was transferred to the telemedicine service on:  12/03/2022   The patient  location is: [id number] A admitted 10/17/2022 10:12 AM.    Interval History/Overnight Events:     Patient able to provide adequate history - daughter at bedside.    Feeling better today - up in chair; ate breakfast and had no diarrhea this morning; nephrology has decided to pursue outpatient dialysis and patient in agreement    Labs reviewed - plt 121    Review of Systems   Constitutional:  Positive for activity change, appetite change and fatigue. Negative for fever.   Respiratory:  Negative for shortness of breath.    Cardiovascular:  Positive for leg swelling.   Gastrointestinal:  Negative for abdominal pain, diarrhea, nausea and vomiting.   Musculoskeletal:  Negative for arthralgias.      Inpatient Medications:  Scheduled Meds:   sodium chloride 0.9%   Intravenous Once    amLODIPine  5 mg Oral Daily    atovaquone  1,500 mg Oral Daily    carvediloL  12.5 mg Oral BID    epoetin hira (PROCRIT) injection  50 Units/kg Subcutaneous Every Mon, Wed, Fri    hydrALAZINE  50 mg Oral Q8H    levothyroxine  25 mcg Oral Before breakfast    pantoprazole  40 mg Oral BID AC    predniSONE  15 mg Oral with lunch    Followed by    [START ON 12/18/2022] predniSONE  12.5 mg Oral with lunch    Followed by    [START ON 1/1/2023] predniSONE  10 mg Oral with lunch    [START ON 1/15/2023] predniSONE  5 mg Oral Daily    QUEtiapine  12.5 mg Oral QHS    sodium chloride 0.9% flush bag IVPB   Intravenous 1 time in Clinic/HOD    white petrolatum   Topical (Top) Daily     Continuous Infusions:  PRN Meds:.acetaminophen, albuterol-ipratropium, aluminum-magnesium hydroxide-simethicone, bisacodyL, cloNIDine, dextrose 10%, dextrose 10%, glucagon (human recombinant), glucose, glucose, heparin (porcine), heparin (porcine), heparin (porcine), heparin, porcine (PF), heparin, porcine (PF), insulin aspart U-100, melatonin, methocarbamoL, metoclopramide HCl, naloxone, ondansetron, prochlorperazine, simethicone, sodium chloride 0.9%, sodium  chloride 0.9%, sodium chloride 0.9%, sodium chloride 0.9%, sodium chloride 0.9%, sodium chloride 0.9%, sucralfate      Objective:     Temp:  [96.9 °F (36.1 °C)-98.7 °F (37.1 °C)] 96.9 °F (36.1 °C)  Pulse:  [69-83] 71  Resp:  [18] 18  SpO2:  [95 %-100 %] 100 %  BP: (127-150)/(60-65) 127/60    No intake or output data in the 24 hours ending 12/06/22 1628       Body mass index is 27.91 kg/m².    Physical Exam  Vitals and nursing note reviewed.   Constitutional:       General: She is not in acute distress.     Appearance: Normal appearance.   HENT:      Head: Normocephalic and atraumatic.   Eyes:      General: No scleral icterus.        Right eye: No discharge.         Left eye: No discharge.      Extraocular Movements: Extraocular movements intact.   Cardiovascular:      Rate and Rhythm: Normal rate.   Pulmonary:      Effort: Pulmonary effort is normal. No tachypnea or respiratory distress.   Musculoskeletal:      Right lower leg: Edema present.      Left lower leg: Edema present.   Neurological:      General: No focal deficit present.      Mental Status: She is alert and oriented to person, place, and time.      Cranial Nerves: No cranial nerve deficit.      Motor: No weakness.   Psychiatric:         Mood and Affect: Mood and affect normal.         Speech: Speech normal.         Behavior: Behavior is cooperative.         Thought Content: Thought content normal.        Labs:  Recent Results (from the past 24 hour(s))   POCT glucose    Collection Time: 12/05/22  9:06 PM   Result Value Ref Range    POCT Glucose 128 (H) 70 - 110 mg/dL   Magnesium    Collection Time: 12/06/22  2:04 AM   Result Value Ref Range    Magnesium 2.0 1.6 - 2.6 mg/dL   Phosphorus    Collection Time: 12/06/22  2:04 AM   Result Value Ref Range    Phosphorus 4.9 (H) 2.7 - 4.5 mg/dL   CBC Auto Differential    Collection Time: 12/06/22  2:04 AM   Result Value Ref Range    WBC 8.87 3.90 - 12.70 K/uL    RBC 2.75 (L) 4.00 - 5.40 M/uL    Hemoglobin 7.9 (L)  12.0 - 16.0 g/dL    Hematocrit 25.6 (L) 37.0 - 48.5 %    MCV 93 82 - 98 fL    MCH 28.7 27.0 - 31.0 pg    MCHC 30.9 (L) 32.0 - 36.0 g/dL    RDW 15.0 (H) 11.5 - 14.5 %    Platelets 121 (L) 150 - 450 K/uL    MPV 11.3 9.2 - 12.9 fL    Immature Granulocytes 1.6 (H) 0.0 - 0.5 %    Gran # (ANC) 6.2 1.8 - 7.7 K/uL    Immature Grans (Abs) 0.14 (H) 0.00 - 0.04 K/uL    Lymph # 2.0 1.0 - 4.8 K/uL    Mono # 0.5 0.3 - 1.0 K/uL    Eos # 0.0 0.0 - 0.5 K/uL    Baso # 0.01 0.00 - 0.20 K/uL    nRBC 0 0 /100 WBC    Gran % 69.4 38.0 - 73.0 %    Lymph % 22.8 18.0 - 48.0 %    Mono % 6.1 4.0 - 15.0 %    Eosinophil % 0.0 0.0 - 8.0 %    Basophil % 0.1 0.0 - 1.9 %    Differential Method Automated    Basic Metabolic Panel    Collection Time: 12/06/22  2:04 AM   Result Value Ref Range    Sodium 138 136 - 145 mmol/L    Potassium 4.6 3.5 - 5.1 mmol/L    Chloride 103 95 - 110 mmol/L    CO2 23 23 - 29 mmol/L    Glucose 74 70 - 110 mg/dL    BUN 48 (H) 8 - 23 mg/dL    Creatinine 7.2 (H) 0.5 - 1.4 mg/dL    Calcium 7.7 (L) 8.7 - 10.5 mg/dL    Anion Gap 12 8 - 16 mmol/L    eGFR 5.5 (A) >60 mL/min/1.73 m^2   POCT glucose    Collection Time: 12/06/22  7:48 AM   Result Value Ref Range    POCT Glucose 79 70 - 110 mg/dL   POCT glucose    Collection Time: 12/06/22 12:30 PM   Result Value Ref Range    POCT Glucose 100 70 - 110 mg/dL        Lab Results   Component Value Date    [id number] Not Detected 10/24/2022       Recent Labs   Lab 12/04/22  0243 12/05/22 0448 12/06/22  0204   WBC 10.27 9.44 8.87   LYMPH 19.2  2.0 20.3  1.9 22.8  2.0   HGB 8.5* 8.3* 7.9*   HCT 26.3* 26.9* 25.6*   PLT 98* 117* 121*       Recent Labs   Lab 12/04/22  0243 12/05/22  0448 12/06/22  0204    140 138   K 4.4 4.2 4.6    104 103   CO2 22* 22* 23   BUN 38* 45* 48*   CREATININE 5.8* 6.4* 7.2*   GLU 55* 63* 74   CALCIUM 7.4* 7.7* 7.7*   MG 1.9 1.9 2.0   PHOS 4.5 4.7* 4.9*       No results for input(s): ALKPHOS, ALT, AST, ALBUMIN, PROT, BILITOT, INR in the last 168  hours.       No results for input(s): DDIMER, FERRITIN, CRP, LDH, BNP, TROPONINI, CPK in the last 72 hours.    Invalid input(s): PROCALCITONIN    All labs within the last 24 hours were reviewed.     Microbiology:  Microbiology Results (last 7 days)       ** No results found for the last 168 hours. **              Imaging  ECG Results              EKG 12-lead (Final result)  Result time 10/17/22 14:26:15      Final result by Interface, Lab In [facility] (10/17/22 14:26:15)                   Narrative:    Test Reason : R06.02,    Vent. Rate : 093 BPM     Atrial Rate : 093 BPM     P-R Int : 126 ms          QRS Dur : 070 ms      QT Int : 356 ms       P-R-T Axes : 048 052 030 degrees     QTc Int : 442 ms    Normal sinus rhythm  Normal ECG  When compared with ECG of 14-OCT-2022 14:26,  Limb lead reversal has been corrected  Confirmed by Jc Barbosa MD ([id number]) on 10/17/2022 2:26:04 PM    Referred By: [email or web address]   SELF           Confirmed By:Jc Barbosa MD                                    Results for orders placed during the hospital encounter of 10/17/22    Echo    Interpretation Summary  · The left ventricle is normal in size with normal systolic function. The estimated ejection fraction is 65%.  · Normal right ventricular size with normal right ventricular systolic function.  · Grade I left ventricular diastolic dysfunction.  · Mild tricuspid regurgitation.  · There is pulmonary hypertension. The estimated PA systolic pressure is 56 mmHg.  · Normal to low central venous pressure (3 mmHg).      X-Ray Abdomen AP 1 View  Narrative: EXAMINATION:  XR ABDOMEN AP 1 VIEW    CLINICAL HISTORY:  NGT placement; Dysphagia, unspecified    TECHNIQUE:  AP View(s) of the abdomen was performed.    COMPARISON:  No 11/20/2022 ne    FINDINGS:  Feeding tube in the stomach.  No significant bowel dilatation.  Impression: See above    Electronically signed by: Ej Strickland MD  Date:    11/14/2022  Time:    11:02      All imaging within the  last 24 hours was reviewed.       Discharge Planning   ANTHONY: 12/8/2022     Code Status: Full Code   Is the patient medically ready for discharge?: No    Reason for patient still in hospital (select all that apply): Patient trending condition, Laboratory test, Treatment, Consult recommendations, and Pending disposition  Discharge Plan A: Skilled Nursing Facility   Discharge Delays: None known at this time          Assessment/Plan:      * Microscopic polyangiitis  As of 10/26/22 strongly positive MPO Ab (in contrast to borderline positive PR3), consistent with clinical picture of microscopic polyangiitis with pulmonary, and renal involvement after presenting with acute hypoxemic respiratory failure, hemoptysis, and acute renal failure.  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   3)  CHANGES SUGGESTIVE OF FOCAL ACUTE PYELONEPHRITIS.   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 Currently on oral steroid taper   - plan for 20mg IV daily on 11/6 for 14 days (last dose of 20mg equivalent is 11/20/2022); completing oral Prednisoneper  PEXIVAS steroid taper  - apheresis: underwent PLEX 10/26, 10/27, 10/30, 11/1,  11/2, 11/3  - rituximab every 7 days for 4 doses: Dose #1: 10/27, #2 11/3, #3 11/10, and #4 given 11/17  - cyclophosphamide every 14 days for 2 doses: 10/27, 11/10  - Opportunistic Infection ppx: atovaquone 1500mg PO daily  - GI bleed prophylaxis: pantoprazole 40mg daily     Anemia due to chronic kidney disease  - 2/2 CKD  - Hg stable  - Monitor trend     Primary pauci-immune necrotizing and crescentic glomerulonephritis  Patient with acute kidney injury likely due to microscopic polyangiitis WILFREDO is currently stable. Labs reviewed- Renal function/electrolytes with Estimated Creatinine Clearance: 5.3 mL/min (A) (based on SCr of 8.3 mg/dL (H)). according to latest data. Monitor urine output and serial BMP and adjust therapy as needed. Avoid nephrotoxins and renally dose meds for GFR listed above.   Nephrology following and patient receives HD as indicated.       Gastric reflux  - PPI BID      High risk medications (not anticoagulants) long-term use  See history of vasculitis therapy      Anuria  - Improving urine output  - Nephrology following- re-evaluating daily for need for Intermittent HD and permacath placement     Gastrointestinal hemorrhage with melena  Starting having hemoptysis and melena with associated acute blood loss anemia earlier in hospital stay. Patient with known internal hemorrhoids, mild sigmoid diverticulosis, history of gastritis and + H pylori (2012 EGD).   - GI consulted 10/19, unable to perform EGD due to instability and respiratory failure at the time    Plan  - PPI PO BID   - Monitor CBC closely for signs of recurrent bleed  - EGD w/no acute bleed seen  - Transfused 2u pRBC    Dysphagia  SLP following  MBSS showing global weakness in swallowing as well as aspiration with thin liquids.   ENT consulted but patient did not tolerate laryngoscopy     Plan   - Dysphagia possibly 2/2 previous stroke  - Briefly required NGT, worked SLP  - NGT removed- being treated with nystatin swish and swallow  for thrush  - GI consulted as well for concern of oropharyngeal candidiasis--> Low suspicion for esophageal candidiasis without odynophagia however can have if white plaques have been seen on oropharynx, ok to start fluconazole empirically for possible esophageal candidiasis  - EGD on 11/14 without abnormality; per GI, Suspect some aspect of esophageal dysmotility in setting of critical illness. No further testing at this time.  - Advanced to renal diet 11/22    Moderate malnutrition  Nutrition consulted. Most recent weight and BMI monitored-     Malnutrition (Moderate to Severe)  Weight Loss (Malnutrition): 7.5% in 3 months    Measurements:  Wt Readings from Last 1 Encounters:   12/03/22 70.6 kg (155 lb 10.3 oz)   Body mass index is 29.41 kg/m².    Recommendations: Recommendation/Intervention: 1.  Goals: Meet % EEN, EPN by RD f/u date    Patient has been screened and assessed by RD. RD will follow patient.      Acute renal failure on dialysis  Due to microscopic polyangiitis. Remains on hemodialysis intermittently.   - Baseline creatinine 1.0-1.2.   - New onset proteinuria/hematuria.   - Renal biopsy completed and   - Trialysis in place for intermittent Hemodialysis    Plan  - Nephrology consulted, appreciate management  - management of MPA as noted separately  - Renal function panel daily  - renally dose all medications  - intermittent Hemodialysis as indicated, per Nephrology--> have decided to pursue outpatient HD; IR consulted for tunneled catheter placement     Recent Labs   Lab 12/04/22  0243 12/05/22  0448 12/06/22  0204   BUN 38* 45* 48*   CREATININE 5.8* 6.4* 7.2*       Acute hypoxemic respiratory failure  Multifocal pneumonia  Hemoptysis  Dyspnea  Diffuse Alveolar Hemorrahge    In the setting of a new diagnosis of microscopic polyangiitis, presented with fevers, dyspnea,.  - Chest imaging was consistent with diffuse alveolar hemorrhage.  CT chest wc 10/17 with JESSICA perihilar dense consolidations w/  peripheral patcy areas of GGO, JESSICA PNA, less c/f cardiogenic pulmonary edema.  - Patient upgraded to Medical ICU 10/23/22 with abrupt respiratory decline requiring NIPPV, improved with high dose IV steroids, plasmapheresis, emergent hemodialysis.   - Unable to perform bronchoscopy in the Medical ICU due to tenuous respiratory status  Hypoxia has resolved    Plan:  - weaned to room air  - continue management of underlying triggers with steroid and immunosuppressants  - incentive spirometry and respiratory hygiene    Lymphadenopathy of head and neck  - Has bilateral neck gland swelling with tenderness; consulted ENT  - No surgical intervention indicated   - Adenopathy likely reactive in nature   - Recommend further workup if it does not resolve within next 2 weeks   - 11/30-- Adenopathy is still present especially right submandibular region    Hyponatremia  - thought to be 2/2 SIADH initially- received NaCl tabs  - NaCl tabs removed  - Na stable on HD      Other specified anemias  In the setting of GI bleed with melena  - Evaluated by GI, see GI bleed documentation  - Last transfusion 10/28, Hgb slowly trending down since then  - Hgb 6.9 on 11/5    Plan  - Monitor CBC Daily   - Treat with pRBC transfusion PRN Hgb <7  - Transfused 3u pRBC    Current CBC reviewed-    Recent Labs   Lab 12/01/22  0344 12/02/22  0423 12/03/22  0313   HGB 8.5* 8.7* 8.6*       Chronic diastolic heart failure  Pulmonary Hypertension due to left heart disease  TTE (10/2022) EF 65%, G1DD  Home meds: Lisinopril, Toprol    Plan  - Active volume control with intermittent Hemodialysis per Nephrology   - Daily weights (standing if tolerated)  - Strict I/Os  - Fluid restriction 1.5 day  - Cardiac diet w/ 2 g Na restriction    Hyperkalemia  - resolved    Primary hypertension  BP Readings from Last 1 Encounters:   12/04/22 (!) 121/59     - Patient was unable to tolerate PO mediations, all meds to be administered via NG tube until removed on 11/15.  -  Will continue to monitor blood pressure trend closely and adjust antihypertensive regimen as clinically indicated and tolerated.    amLODIPine, carvediloL, hydrALAZINE; doses decreased on 12/4 due to low BP with extreme fatigue and weakness          Hypothyroid  - Continue synthroid 25 mcg  - TSH 0.585 10/27/22      VTE Risk Mitigation (From admission, onward)         Ordered     heparin (porcine) injection 1,000 Units  As needed (PRN)         11/29/22 0857     heparin (porcine) injection 1,000 Units  As needed (PRN)         11/22/22 0832     heparin, porcine (PF) 100 unit/mL injection flush 500 Units  As needed (PRN)         11/17/22 1343     heparin, porcine (PF) 100 unit/mL injection flush 500 Units  As needed (PRN)         11/10/22 1430     heparin (porcine) injection 1,000 Units  As needed (PRN)         11/09/22 1601     heparin (porcine) injection 1,000 Units  As needed (PRN)         11/08/22 0854     Place sequential compression device  Until discontinued         10/25/22 1731     IP VTE HIGH RISK PATIENT  Once         10/17/22 1354     Reason for No Pharmacological VTE Prophylaxis  Once        Question:  Reasons:  Answer:  Risk of Bleeding    10/17/22 1354              High Risk Conditions:  Patient has a condition that poses threat to life and bodily function: Acute Renal Failure      I have completed this tele-visit without the assistance of a telepresenter.    The attending portion of this evaluation, treatment, and documentation was performed per Haleigh Parks MD via Telemedicine AudioVisual using the secure [employer] software platform with 2 way audio/video. The provider was located off-site and the patient is located in the hospital. The aforementioned video software was utilized to document the relevant history and physical exam    Haleigh Parks MD  Department of Hospital Medicine   [facility] - Intensive Care ([place]

## 2022-12-06 NOTE — PLAN OF CARE
Pt AAOx4. RA. R Trialysis; intact. MARGUERITE midline; flushed and saline locked. Pt remains on Renal diet; lactose restricted. LBM 12/5/22. Blood glucose continues to be monitored before meals and at bedtime w/ s/s insulin available if needed. Bed in lowest position, wheels locked, siderails up x2, bed alarm on and call light within reach. No c/o pain voiced/expressed. No s/s of distress noted. Monitoring continues.

## 2022-12-06 NOTE — PROGRESS NOTES
Wound care follow up performed to reassess wounds and treatment.  Upon arrival, patient found in hospital bed aao x 3. Assisted with moving legs to evaluate perineal area.  Noted healed wounds to BL perineum, Resolving wound to left lower labia present with decrease is dimensions. Continue triad BID and leave SARAHI,  Turn q 2 hr per hospital protocol. Due to healing of more than 80%,  wound care dept is signing off.  Please re consult for any new concerns.    12/05/22 1601   WOCN Assessment   WOCN Total Time (mins) 20   Visit Date 12/05/22   Visit Time 1600   Consult Type Follow Up   Intervention assessed   Teaching discharge   Skin Interventions   Skin Protection adhesive use limited;drying agents applied;incontinence pads utilized;skin-to-device areas padded;skin-to-skin areas padded   Pressure Injury Prevention    Check Moisture Management Pad Done   Check Medical Devices Done        Altered Skin Integrity 11/07/22 1622 Right lower Labia Ulceration   Date First Assessed/Time First Assessed: 11/07/22 1622   Altered Skin Integrity Present on Admission: yes  Side: Right  Orientation: lower  Location: Labia  Is this injury device related?: No  Primary Wound Type: Ulceration   Wound Image   (refer to picture of right perineum)   Appearance Intact;Closed/resurfaced        Altered Skin Integrity 11/07/22 1623 Right anterior Perineum Ulceration   Date First Assessed/Time First Assessed: 11/07/22 1623   Altered Skin Integrity Present on Admission: yes  Side: Right  Orientation: anterior  Location: Perineum  Is this injury device related?: No  Primary Wound Type: Ulceration   Wound Image    Drainage Amount None   Appearance Intact;Closed/resurfaced        Altered Skin Integrity 11/07/22 1623 Left Perineum Ulceration   Date First Assessed/Time First Assessed: 11/07/22 1623   Altered Skin Integrity Present on Admission: yes  Side: Left  Location: Perineum  Is this injury device related?: No  Primary Wound Type: Ulceration    Wound Image    Dressing Appearance Open to air   Drainage Amount None   Appearance Red;Dry   Red (%), Wound Tissue Color 100 %   Wound Length (cm) 0.2 cm   Wound Width (cm) 0.7 cm   Wound Depth (cm) 0.1 cm   Wound Volume (cm^3) 0.014 cm^3   Wound Surface Area (cm^2) 0.14 cm^2   Care   (triad and quentin)   Dressing Change Due 12/06/22
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
## 2022-12-06 NOTE — SUBJECTIVE & OBJECTIVE
Interval History: Patient evaluated at bedside. No acute events overnight. Refers feeling well, no complaints this AM.     Review of patient's allergies indicates:   Allergen Reactions    Ampicillin     Peaches [peach (prunus persica)] Other (See Comments)     Pt unable to state type of reaction. Information obtained from daughter who states she was informed of allergy from patient.    Penicillins      Other reaction(s): Hives    Sulfa (sulfonamide antibiotics) Rash and Hives     Current Facility-Administered Medications   Medication Frequency    0.9%  NaCl infusion Once    acetaminophen tablet 650 mg Q4H PRN    albuterol-ipratropium 2.5 mg-0.5 mg/3 mL nebulizer solution 3 mL Q4H PRN    aluminum-magnesium hydroxide-simethicone 200-200-20 mg/5 mL suspension 30 mL QID PRN    amLODIPine tablet 5 mg Daily    atovaquone 750 mg/5 mL oral liquid 1,500 mg Daily    bisacodyL suppository 10 mg Daily PRN    carvediloL tablet 12.5 mg BID    cloNIDine tablet 0.1 mg Q8H PRN    dextrose 10% bolus 125 mL PRN    dextrose 10% bolus 250 mL PRN    epoetin hira injection 2,880 Units Every Mon, Wed, Fri    glucagon (human recombinant) injection 1 mg PRN    glucose chewable tablet 16 g PRN    glucose chewable tablet 24 g PRN    heparin (porcine) injection 1,000 Units PRN    heparin (porcine) injection 1,000 Units PRN    heparin (porcine) injection 1,000 Units PRN    heparin, porcine (PF) 100 unit/mL injection flush 500 Units PRN    heparin, porcine (PF) 100 unit/mL injection flush 500 Units PRN    hydrALAZINE tablet 50 mg Q8H    insulin aspart U-100 pen 1-10 Units PRN    levothyroxine tablet 25 mcg Before breakfast    melatonin tablet 6 mg Nightly PRN    methocarbamoL tablet 500 mg TID PRN    metoclopramide HCl injection 5 mg Q6H PRN    naloxone 0.4 mg/mL injection 0.02 mg PRN    ondansetron injection 4 mg Q8H PRN    pantoprazole EC tablet 40 mg BID AC    predniSONE tablet 15 mg with lunch    Followed by    [START ON 12/18/2022]  predniSONE tablet 12.5 mg with lunch    Followed by    [START ON 1/1/2023] predniSONE tablet 10 mg with lunch    [START ON 1/15/2023] predniSONE tablet 5 mg Daily    prochlorperazine injection Soln 5 mg Q6H PRN    quetiapine split tablet 12.5 mg QHS    simethicone chewable tablet 80 mg QID PRN    sodium chloride 0.9% 250 mL flush bag 1 time in Clinic/HOD    sodium chloride 0.9% bolus 250 mL PRN    sodium chloride 0.9% bolus 250 mL PRN    sodium chloride 0.9% flush 10 mL PRN    sodium chloride 0.9% flush 10 mL PRN    sodium chloride 0.9% flush 10 mL PRN    sodium chloride 0.9% flush 10 mL PRN    sucralfate tablet 1 g TID PRN    white petrolatum 41 % ointment Daily     Facility-Administered Medications Ordered in Other Encounters   Medication Frequency    celecoxib capsule 400 mg Once    fentaNYL 50 mcg/mL injection  mcg PRN    LIDOcaine (PF) 10 mg/ml (1%) injection 10 mg Once PRN    LIDOcaine (PF) 10 mg/ml (1%) injection 10 mg Once    midazolam (VERSED) 1 mg/mL injection 0.5-4 mg PRN    ropivacaine 0.2% [facility] PainPRO Pump infusion 500 ML Continuous       Objective:     Vital Signs (Most Recent):  Temp: 96.9 °F (36.1 °C) (12/06/22 1228)  Pulse: 71 (12/06/22 1228)  Resp: 18 (12/06/22 0340)  BP: 127/60 (12/06/22 1228)  SpO2: 100 % (12/06/22 1228)  O2 Device (Oxygen Therapy): room air (12/06/22 0340)   Vital Signs (24h Range):  Temp:  [96.9 °F (36.1 °C)-98.7 °F (37.1 °C)] 96.9 °F (36.1 °C)  Pulse:  [68-83] 71  Resp:  [17-18] 18  SpO2:  [95 %-100 %] 100 %  BP: (127-150)/(60-65) 127/60     Weight: 67 kg (147 lb 11.3 oz) (12/06/22 0500)  Body mass index is 27.91 kg/m².  Body surface area is 1.7 meters squared.    I/O last 3 completed shifts:  In: -   Out: 200 [Urine:200]    Physical Exam  Vitals and nursing note reviewed.   Constitutional:       General: She is awake.      Appearance: She is well-developed. She is ill-appearing. She is not toxic-appearing or diaphoretic.   HENT:      Head: Normocephalic and  atraumatic.      Right Ear: External ear normal.      Left Ear: External ear normal.      Nose:      Comments: + NGT     Mouth/Throat:      Mouth: Mucous membranes are dry.   Eyes:      General: Lids are normal. No scleral icterus.        Right eye: No discharge.         Left eye: No discharge.   Cardiovascular:      Rate and Rhythm: Normal rate and regular rhythm.   Pulmonary:      Effort: Pulmonary effort is normal.      Breath sounds: Normal breath sounds. No wheezing or rhonchi.   Abdominal:      General: Bowel sounds are normal.      Palpations: Abdomen is soft.      Tenderness: There is no abdominal tenderness.   Musculoskeletal:         General: No deformity.      Cervical back: Normal range of motion and neck supple. No rigidity or tenderness.      Right lower leg: No edema.      Left lower leg: No edema.   Skin:     General: Skin is warm and dry.   Neurological:      General: No focal deficit present.      Mental Status: She is alert and oriented to person, place, and time. Mental status is at baseline.   Psychiatric:         Attention and Perception: Attention normal.         Behavior: Behavior normal.       Significant Labs:  CBC:   Recent Labs   Lab 12/06/22  0204   WBC 8.87   RBC 2.75*   HGB 7.9*   HCT 25.6*   *   MCV 93   MCH 28.7   MCHC 30.9*       CMP:   Recent Labs   Lab 12/06/22  0204   GLU 74   CALCIUM 7.7*      K 4.6   CO2 23      BUN 48*   CREATININE 7.2*       All labs within the past 24 hours have been reviewed.

## 2022-12-06 NOTE — ASSESSMENT & PLAN NOTE
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  PLEX 10/26, 10/27, 10/29, 10/30, 11/1, 11/2, 11/3 (prior to Rituxan)  Rituximab 375 mg/m^2 (600 mg) on 10/27 11/3, 11/10 and 11/17 (completed 4 doses)  Cyclophosphamide 7.5 mg/kg on 10/27 and 11/10 ( every 14 days ); has completed 2 doses; may need another dose of cyclophosphamide 6 weeks after her last dose (around December 22, 2022)  Serum BUN/Cr slowly trending up; will continue monitoring daily for dialysis needs  Now following PEXIVAS steroid taper; currently on Prednisone 15mg PO daily   Sodium bicarb 650mg PO BID   Continue Atovaquone for prophylaxis   Pt still needs dialysis for clearance about once or twice per week; please obtain consult for tunneled dialysis catheter placement   SW for outpatient HD chair   Strict I/Os and chart   Avoid nephrotoxic agents as much as possible  No emergent need for hemodialysis today; will continue evaluation on a daily basis

## 2022-12-06 NOTE — PT/OT/SLP PROGRESS
Physical Therapy Treatment    Patient Name:  Kristin Goodman   MRN:  [id number]    Recommendations:     Discharge Recommendations: nursing facility, skilled  Discharge Equipment Recommendations: bedside commode, walker, rolling  Barriers to discharge: None    Assessment:     Kristin Goodman is a 77 y.o. female admitted with a medical diagnosis of Microscopic polyangiitis.  She presents with the following impairments/functional limitations:  (weakness; impaired endurance; impaired self care skills; impaired functional mobility; decreased lower extremity function; decreased upper extremity function; decreased safety awareness; impaired cardiopulmonary response to activity) pt declined to perform functional mobility this date due to feeling tired. Pt educated on importance of perfroming functional mobility. Pt agreeable to therex in chair. Patient remains appropriate for continued skilled services within the acute environment and goals remain appropriate.    Rehab Prognosis: Good; patient would benefit from acute skilled PT services to address these deficits and reach maximum level of function.    Recent Surgery: Procedure(s) (LRB):  EGD (ESOPHAGOGASTRODUODENOSCOPY) (N/A) 22 Days Post-Op    Plan:     During this hospitalization, patient to be seen 3 x/week to address the identified rehab impairments via gait training, therapeutic activities, therapeutic exercises, neuromuscular re-education and progress toward the following goals:    Plan of Care Expires:  12/28/22    Subjective     Chief Complaint: my legs are swollen  Patient/Family Comments/goals: I am to tired to get up and walk  Pain/Comfort:  Pain Rating 1: 0/10      Objective:     Communicated with RN prior to session.  Patient found up in chair with   upon PT entry to room.     General Precautions: Standard, fall  Orthopedic Precautions: N/A  Braces: N/A  Respiratory Status: Room air     Functional Mobility:  N/t due to pt declined      AM-PAC 6 CLICK  MOBILITY  Turning over in bed (including adjusting bedclothes, sheets and blankets)?: 4  Sitting down on and standing up from a chair with arms (e.g., wheelchair, bedside commode, etc.): 3  Moving from lying on back to sitting on the side of the bed?: 3  Moving to and from a bed to a chair (including a wheelchair)?: 3  Need to walk in hospital room?: 4  Climbing 3-5 steps with a railing?: 2  Basic Mobility Total Score: 19       Treatment & Education:  Therapist provided instruction and educated of  patient on progress, safety,d/c,PT POC,   proper body mechanics, energy conservation, and fall prevention strategies during tasks listed above, on the effects of prolonged immobility and the importance of performing OOB activity and exercises to promote healing and reduce recovery time      Patient  facilitated therex  seated in bedside chair B LE A/AAROM AP, HS, Hip Flexion, Hip Abd/Add. Patient required skilled PTA for instruction of exercises and appropriate cues to perform exercises safely, sequencing and appropriately.   Exercises performed to develop and maintain pt's strength, endurance and flexibility.  Updated white board with appropriate PT mobility information for medical team notification    Call nursing/pct to transfer to chair/use bathroom. Pt stated understanding      Bedside table in front of patient and area set up for function, convenience, and safety. RN aware of patient's mobility needs and status. Questions/concerns addressed within PTA scope of practice; patient  with no further questions. Time was provided for active listening, discussion of health disposition, and discussion of safe discharge. Pt?verbalized?agreement .    Patient left up in chair with all lines intact and call button in reach..    GOALS:   Multidisciplinary Problems       Physical Therapy Goals          Problem: Physical Therapy    Goal Priority Disciplines Outcome Goal Variances Interventions   Physical Therapy Goal     PT, PT/OT  Ongoing, Progressing     Description: Goals remain appropriate and extended to: 2022     Patient will increase functional independence with mobility by performin. Supine to sit with contact guard assistance  2. Sit to supine with contact guard assistance MET   2a. Sit to supine independently  3. Sit to stand transfer with minimum assistance MET   3a. STS supvn LRAD  4. Gait  x 40 feet with minimum assistance using LRAD as needed  5. Lower extremity exercise program x10 reps per handout, with independence                        Time Tracking:     PT Received On: 22  PT Start Time: 1246     PT Stop Time: 1256  PT Total Time (min): 10 min     Billable Minutes: Therapeutic Exercise 10    Treatment Type: Treatment  PT/PTA: PTA     PTA Visit Number: 2     2022

## 2022-12-06 NOTE — PROGRESS NOTES
[facility] - Intensive Care ([place] [address])  [facility] Medicine  Telemedicine Progress Note    Patient Name: Kristin Goodman  MRN: [id number]  Patient Class: IP- Inpatient   Admission Date: 10/17/2022  Length of Stay: 49 days  Attending Physician: Haleigh Parks MD  Primary Care Provider: Lori Hernandez MD          Subjective:     Principal Problem:Microscopic polyangiitis        HPI:  Kristin Goodman is a 77 y.o. female with a past medical history of HTN, hypothyroidism, HFpEF, and osteoarthritis of the arm who has presented to the ED for cough, SOB, and weakness. Daughter is present at the bedside. Patient presented to the ED on 9/24 with hemoptysis, CT and CXR showed high suspicion for multilobar pneumonia. Patient was discharged with a 10-day course of levofloxacin and an albuterol inhaler. Patient followed up with her PCP on 10/4 with slight improvement in symptoms and went back to work. Patient continued to have worsening symptoms and presented to the ED again on 10/14 for evaluation and no interventions were done. Patient endorses fevers over the last few days up to 102.4 F with progressive SOB. She endorses hemoptysis with moderate amount of blood, generalized weakness, productive cough, SOB, and loss of appetite. Denies chest pain, nausea, vomiting, abdominal pain, or urinary changes.    ED: hypertensive up to 219/93 and tachycardic up to 117. Oxygen saturation on 92% on RA, placed on 5L NC with sats >97%. CBC remarkable for leukocytosis of 16.03 and Hb 7.7. K 3.3. Cr 1.6, baseline ~1.0. . Troponin 0.061. COVID and flu negative. EKG NSR. CT chest with contrast pending at time of admission. Given home amlodipine and lisinopril. Given 500mL NS, IV azithromycin, cefepime and vanc.       Overview/Hospital Course:  HM Course:  Ms. Goodman was admitted to Hospital Medicine for management of multifocal pneumonia and acute hypoxemic respiratory failure after presenting to the ED with fevers, cough,  hemoptysis, and dyspnea. She required escalation to the MICU due to abrupt decline in her respiratory status, associated with hemoptysis and requiring NIPPV. CT chest showed bilateral patchy ground glass opacities. Labs additionally were notable for acute renal failure on CKD3. Pulmonology was consulted while under the care of [facility] Medicine and felt this picture was consistent with diffuse alveolar hemorrhage.     ICU Course:   Her workup revealed a strongly positive MPO Ab consistent with clinical picture of microscopic polyangiitis with pulmonary and renal involvement. She underwent Trialysis catheter placement 10/25/2022 in the Medical ICU. 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     Rheumatology was consulted, extensive workup revealed MITUL + 1:2560 homogenous, +dsDNA, normal complements, PR3 1.2 (slight +),  (+), cANCA + 1:80, pANCA neg, GBMAb neg, trace cryos. Due to concern for MPA, she was started on pulse dose IV methylprednisolone 1000mg for 3 days, and plasmapheresis under the guidance of Transfusion Medicine. Patient was placed on steroid taper and completed PLEX. She additionally received rituximab and cyclophosphamide. OI prophylaxis was provided with atovaquone due to a sulfa allergy.     Nephrology was consulted for evaluation of acute renal failure in the setting of MPA. She did require SLED in the ICU for renal clearance and remains on intermittent hemodialysis. She is expected to continue HD once discharged.     Her acute hypoxemic respiratory failure improved and she was weaned off of supplemental oxygen. She stepped down to [facility] Medicine 10/28.     HM Course:  She underwent MBBS for evaluation of dysphagia, which showed global delayed  initiation of swallow and aspiration with thin liquids. Due to concern for possible prior stroke, head imaging was completed and negative for acute finding. She was NPO with NG tube. ENT was consulted for evaluation of dysphagia and cervical adenopathy.  Patient did not tolerate laryngoscopy; unable to visualize vocal cords but given her lack of hoarseness, they did not suspect vocal fold paresis/paralysis. MRI recommended by rheum to fully rule out any potential neurological cause of the patient's dysphagia; MRI demonstrated remote left thalamic lacunar type infarct and punctate remote left cerebellar infarcts.  She continued to work with speech therapy.  EGD completed 11/14 without abnormalities.  Per GI, Suspect some aspect of esophageal dysmotility in setting of critical illness. No further testing at this time.  Per SLP, diet advanced on 11/15 and NG tube removed.    Her other chronic medical conditions including hypothyroidism, diastolic CHF, and hypertension were managed with her home medications, with dose adjustments as needed.     Patient was noted to have downtrending Hg 11/17- required 1u pRBC. Rheum continued to follow for further steroid dosing. Patient was started on 2g NaCl tab TID for SIADH. SLP recommended mechanical soft diet with thin liquids 11/17. Na normalized 11/18. Hg improved to 8.5 following 1u pRBC. Patient had some hyperkalemia- started on scheduled lokelma. Patient continued to have improved UOP; however, BUN: Cr ratio uptrended. Nephro decided to hang off on HD as patient was having 500-600mL/24hr. He was started on scheduled NaHCO3 as ewll. Patient underwent HD 11/23. Patient had drop in Hg once again- 6.5 on 11/25. Given 1u pRBC (2nd unit). Underwent HD 11/30. Nephrology following to re-evaluate daily for need for HD and permacath placement.         This encounter was provided through telemedicine.  Patient was transferred to the telemedicine service on:  12/03/2022   The patient  location is: [id number] A admitted 10/17/2022 10:12 AM.    Interval History/Overnight Events:     Patient able to provide adequate history - daughter at bedside.    Decreased edema to LE; feels much better today without dizziness or fatigue; up in the chair; ate breakfast - BP stable; 1 BM after AM meds which was soft    Labs reviewed - plt 117      Review of Systems   Constitutional:  Positive for activity change, appetite change and fatigue. Negative for fever.   Respiratory:  Negative for shortness of breath.    Cardiovascular:  Positive for leg swelling.   Gastrointestinal:  Negative for abdominal pain, diarrhea, nausea and vomiting.   Musculoskeletal:  Negative for arthralgias.      Inpatient Medications:  Scheduled Meds:   sodium chloride 0.9%   Intravenous Once    amLODIPine  5 mg Oral Daily    [START ON 12/6/2022] atovaquone  1,500 mg Oral Daily    carvediloL  12.5 mg Oral BID    epoetin hira (PROCRIT) injection  50 Units/kg Subcutaneous Every Mon, Wed, Fri    hydrALAZINE  50 mg Oral Q8H    levothyroxine  25 mcg Oral Before breakfast    pantoprazole  40 mg Oral BID AC    predniSONE  15 mg Oral with lunch    Followed by    [START ON 12/18/2022] predniSONE  12.5 mg Oral with lunch    Followed by    [START ON 1/1/2023] predniSONE  10 mg Oral with lunch    [START ON 1/15/2023] predniSONE  5 mg Oral Daily    QUEtiapine  12.5 mg Oral QHS    sodium chloride 0.9% flush bag IVPB   Intravenous 1 time in Clinic/HOD     Continuous Infusions:  PRN Meds:.acetaminophen, albuterol-ipratropium, aluminum-magnesium hydroxide-simethicone, bisacodyL, cloNIDine, dextrose 10%, dextrose 10%, glucagon (human recombinant), glucose, glucose, heparin (porcine), heparin (porcine), heparin (porcine), heparin, porcine (PF), heparin, porcine (PF), insulin aspart U-100, melatonin, methocarbamoL, metoclopramide HCl, naloxone, ondansetron, prochlorperazine, simethicone, sodium chloride 0.9%, sodium chloride 0.9%, sodium chloride 0.9%, sodium  chloride 0.9%, sodium chloride 0.9%, sodium chloride 0.9%, sucralfate      Objective:     Temp:  [96.4 °F (35.8 °C)-99.7 °F (37.6 °C)] 98.3 °F (36.8 °C)  Pulse:  [67-74] 69  Resp:  [17-18] 17  SpO2:  [93 %-96 %] 94 %  BP: (118-136)/(57-63) 131/58    No intake or output data in the 24 hours ending 12/05/22 1055       Body mass index is 28.53 kg/m².    Physical Exam  Vitals and nursing note reviewed.   Constitutional:       General: She is not in acute distress.     Appearance: Normal appearance.   HENT:      Head: Normocephalic and atraumatic.   Eyes:      General: No scleral icterus.        Right eye: No discharge.         Left eye: No discharge.      Extraocular Movements: Extraocular movements intact.   Cardiovascular:      Rate and Rhythm: Normal rate.   Pulmonary:      Effort: Pulmonary effort is normal. No tachypnea or respiratory distress.   Musculoskeletal:      Right lower leg: Edema present.      Left lower leg: Edema present.   Neurological:      General: No focal deficit present.      Mental Status: She is alert and oriented to person, place, and time.      Cranial Nerves: No cranial nerve deficit.      Motor: No weakness.   Psychiatric:         Mood and Affect: Mood and affect normal.         Speech: Speech normal.         Behavior: Behavior is cooperative.         Thought Content: Thought content normal.        Labs:  Recent Results (from the past 24 hour(s))   POCT glucose    Collection Time: 12/04/22 11:40 AM   Result Value Ref Range    POCT Glucose 79 70 - 110 mg/dL   POCT glucose    Collection Time: 12/04/22  4:08 PM   Result Value Ref Range    POCT Glucose 93 70 - 110 mg/dL   POCT glucose    Collection Time: 12/04/22  7:16 PM   Result Value Ref Range    POCT Glucose 92 70 - 110 mg/dL   Magnesium    Collection Time: 12/05/22  4:48 AM   Result Value Ref Range    Magnesium 1.9 1.6 - 2.6 mg/dL   Phosphorus    Collection Time: 12/05/22  4:48 AM   Result Value Ref Range    Phosphorus 4.7 (H) 2.7 - 4.5  mg/dL   CBC Auto Differential    Collection Time: 12/05/22  4:48 AM   Result Value Ref Range    WBC 9.44 3.90 - 12.70 K/uL    RBC 2.86 (L) 4.00 - 5.40 M/uL    Hemoglobin 8.3 (L) 12.0 - 16.0 g/dL    Hematocrit 26.9 (L) 37.0 - 48.5 %    MCV 94 82 - 98 fL    MCH 29.0 27.0 - 31.0 pg    MCHC 30.9 (L) 32.0 - 36.0 g/dL    RDW 15.0 (H) 11.5 - 14.5 %    Platelets 117 (L) 150 - 450 K/uL    MPV 11.4 9.2 - 12.9 fL    Immature Granulocytes 1.2 (H) 0.0 - 0.5 %    Gran # (ANC) 6.8 1.8 - 7.7 K/uL    Immature Grans (Abs) 0.11 (H) 0.00 - 0.04 K/uL    Lymph # 1.9 1.0 - 4.8 K/uL    Mono # 0.6 0.3 - 1.0 K/uL    Eos # 0.0 0.0 - 0.5 K/uL    Baso # 0.02 0.00 - 0.20 K/uL    nRBC 0 0 /100 WBC    Gran % 71.8 38.0 - 73.0 %    Lymph % 20.3 18.0 - 48.0 %    Mono % 6.5 4.0 - 15.0 %    Eosinophil % 0.0 0.0 - 8.0 %    Basophil % 0.2 0.0 - 1.9 %    Differential Method Automated    Basic Metabolic Panel    Collection Time: 12/05/22  4:48 AM   Result Value Ref Range    Sodium 140 136 - 145 mmol/L    Potassium 4.2 3.5 - 5.1 mmol/L    Chloride 104 95 - 110 mmol/L    CO2 22 (L) 23 - 29 mmol/L    Glucose 63 (L) 70 - 110 mg/dL    BUN 45 (H) 8 - 23 mg/dL    Creatinine 6.4 (H) 0.5 - 1.4 mg/dL    Calcium 7.7 (L) 8.7 - 10.5 mg/dL    Anion Gap 14 8 - 16 mmol/L    eGFR 6.3 (A) >60 mL/min/1.73 m^2   POCT glucose    Collection Time: 12/05/22  6:55 AM   Result Value Ref Range    POCT Glucose 67 (L) 70 - 110 mg/dL        Lab Results   Component Value Date    [id number] Not Detected 10/24/2022       Recent Labs   Lab 12/03/22 0313 12/04/22 0243 12/05/22 0448   WBC 11.67 10.27 9.44   LYMPH 15.0*  1.8 19.2  2.0 20.3  1.9   HGB 8.6* 8.5* 8.3*   HCT 28.0* 26.3* 26.9*   * 98* 117*       Recent Labs   Lab 12/03/22 0312 12/04/22 0243 12/05/22 0448   * 140 140   K 4.3 4.4 4.2   CL 96 104 104   CO2 24 22* 22*   BUN 71* 38* 45*   CREATININE 8.3* 5.8* 6.4*   GLU 68* 55* 63*   CALCIUM 7.8* 7.4* 7.7*   MG 1.9 1.9 1.9   PHOS 5.4* 4.5 4.7*       No results  for input(s): ALKPHOS, ALT, AST, ALBUMIN, PROT, BILITOT, INR in the last 168 hours.       No results for input(s): DDIMER, FERRITIN, CRP, LDH, BNP, TROPONINI, CPK in the last 72 hours.    Invalid input(s): PROCALCITONIN    All labs within the last 24 hours were reviewed.     Microbiology:  Microbiology Results (last 7 days)       ** No results found for the last 168 hours. **              Imaging  ECG Results              EKG 12-lead (Final result)  Result time 10/17/22 14:26:15      Final result by Interface, Lab In [facility] (10/17/22 14:26:15)                   Narrative:    Test Reason : R06.02,    Vent. Rate : 093 BPM     Atrial Rate : 093 BPM     P-R Int : 126 ms          QRS Dur : 070 ms      QT Int : 356 ms       P-R-T Axes : 048 052 030 degrees     QTc Int : 442 ms    Normal sinus rhythm  Normal ECG  When compared with ECG of 14-OCT-2022 14:26,  Limb lead reversal has been corrected  Confirmed by Jc Barbosa MD ([id number]) on 10/17/2022 2:26:04 PM    Referred By: [email or web address]   SELF           Confirmed By:Jc Barbosa MD                                    Results for orders placed during the hospital encounter of 10/17/22    Echo    Interpretation Summary  · The left ventricle is normal in size with normal systolic function. The estimated ejection fraction is 65%.  · Normal right ventricular size with normal right ventricular systolic function.  · Grade I left ventricular diastolic dysfunction.  · Mild tricuspid regurgitation.  · There is pulmonary hypertension. The estimated PA systolic pressure is 56 mmHg.  · Normal to low central venous pressure (3 mmHg).      X-Ray Abdomen AP 1 View  Narrative: EXAMINATION:  XR ABDOMEN AP 1 VIEW    CLINICAL HISTORY:  NGT placement; Dysphagia, unspecified    TECHNIQUE:  AP View(s) of the abdomen was performed.    COMPARISON:  No 11/20/2022 ne    FINDINGS:  Feeding tube in the stomach.  No significant bowel dilatation.  Impression: See above    Electronically signed by: Ej  MD Marco A  Date:    11/14/2022  Time:    11:02      All imaging within the last 24 hours was reviewed.       Discharge Planning   ANTHONY: 12/8/2022     Code Status: Full Code   Is the patient medically ready for discharge?: No    Reason for patient still in hospital (select all that apply): Patient trending condition, Laboratory test, Treatment, Consult recommendations, and Pending disposition  Discharge Plan A: Skilled Nursing Facility   Discharge Delays: None known at this time          Assessment/Plan:      * Microscopic polyangiitis  As of 10/26/22 strongly positive MPO Ab (in contrast to borderline positive PR3), consistent with clinical picture of microscopic polyangiitis with pulmonary, and renal involvement after presenting with acute hypoxemic respiratory failure, hemoptysis, and acute renal failure.  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   3)  CHANGES SUGGESTIVE OF FOCAL ACUTE PYELONEPHRITIS.   4)  MILD-TO-MODERATE ARTERIONEPHROSCLEROSIS   - Rheumatology consulted, appreciate evaluation and recommendations     - MITUL + 1:2560 homogenous, +dsDNA, normal complements     - PR3 1.2 (slight +),  (+)     - cANCA + 1:80, pANCA neg     - GBMAb neg, trace cryos  - Transfusion Medicine consulted for apheresis  - Nephrology consulted, see separate documentation for WILFREDO      Plan  - Steroids:    - s/p IV Solumedrol 1000 mg x3 doses. Currently on oral steroid taper   - plan for 20mg IV daily on 11/6 for 14 days (last dose of 20mg equivalent is 11/20/2022); completing oral Prednisoneper   PEXIVAS steroid taper  - apheresis: underwent PLEX 10/26, 10/27, 10/30, 11/1, 11/2, 11/3  - rituximab every 7 days for 4 doses: Dose #1: 10/27, #2 11/3, #3 11/10, and #4 given 11/17  - cyclophosphamide every 14 days for 2 doses: 10/27, 11/10  - Opportunistic Infection ppx: atovaquone 1500mg PO daily  - GI bleed prophylaxis: pantoprazole 40mg daily     Anemia due to chronic kidney disease  - 2/2 CKD  - Hg stable  - Monitor trend     Primary pauci-immune necrotizing and crescentic glomerulonephritis  Patient with acute kidney injury likely due to microscopic polyangiitis WILFREDO is currently stable. Labs reviewed- Renal function/electrolytes with Estimated Creatinine Clearance: 5.3 mL/min (A) (based on SCr of 8.3 mg/dL (H)). according to latest data. Monitor urine output and serial BMP and adjust therapy as needed. Avoid nephrotoxins and renally dose meds for GFR listed above.   Nephrology following and patient receives HD as indicated.       Gastric reflux  - PPI BID      High risk medications (not anticoagulants) long-term use  See history of vasculitis therapy      Anuria  - Improving urine output  - Nephrology following- re-evaluating daily for need for Intermittent HD and permacath placement     Gastrointestinal hemorrhage with melena  Starting having hemoptysis and melena with associated acute blood loss anemia earlier in hospital stay. Patient with known internal hemorrhoids, mild sigmoid diverticulosis, history of gastritis and + H pylori (2012 EGD).   - GI consulted 10/19, unable to perform EGD due to instability and respiratory failure at the time    Plan  - PPI PO BID   - Monitor CBC closely for signs of recurrent bleed  - EGD w/no acute bleed seen  - Transfused 2u pRBC    Dysphagia  SLP following  MBSS showing global weakness in swallowing as well as aspiration with thin liquids.   ENT consulted but patient did not tolerate laryngoscopy     Plan   - Dysphagia possibly 2/2 previous stroke  - Briefly required  NGT, worked SLP  - NGT removed- being treated with nystatin swish and swallow for thrush  - GI consulted as well for concern of oropharyngeal candidiasis--> Low suspicion for esophageal candidiasis without odynophagia however can have if white plaques have been seen on oropharynx, ok to start fluconazole empirically for possible esophageal candidiasis  - EGD on 11/14 without abnormality; per GI, Suspect some aspect of esophageal dysmotility in setting of critical illness. No further testing at this time.  - Advanced to renal diet 11/22    Moderate malnutrition  Nutrition consulted. Most recent weight and BMI monitored-     Malnutrition (Moderate to Severe)  Weight Loss (Malnutrition): 7.5% in 3 months    Measurements:  Wt Readings from Last 1 Encounters:   12/03/22 70.6 kg (155 lb 10.3 oz)   Body mass index is 29.41 kg/m².    Recommendations: Recommendation/Intervention: 1.  Goals: Meet % EEN, EPN by RD f/u date    Patient has been screened and assessed by RD. RD will follow patient.      Acute renal failure on dialysis  Due to microscopic polyangiitis. Remains on hemodialysis intermittently.   - Baseline creatinine 1.0-1.2.   - New onset proteinuria/hematuria.   - Renal biopsy completed and   - Trialysis in place for intermittent Hemodialysis    Plan  - Nephrology consulted, appreciate management  - management of MPA as noted separately  - Renal function panel daily  - renally dose all medications  - intermittent Hemodialysis as indicated, per Nephrology--> still evaluating daily for HD need and permacath placement- if needing permacath placement, then will need to be done prior to dc and outpatient HD chair arranged.      Recent Labs   Lab 12/02/22  0423 12/03/22  0312 12/04/22  0243   BUN 62* 71* 38*   CREATININE 8.1* 8.3* 5.8*       Acute hypoxemic respiratory failure  Multifocal pneumonia  Hemoptysis  Dyspnea  Diffuse Alveolar Hemorrahge    In the setting of a new diagnosis of microscopic polyangiitis,  presented with fevers, dyspnea,.  - Chest imaging was consistent with diffuse alveolar hemorrhage.  CT chest wc 10/17 with JESSICA perihilar dense consolidations w/ peripheral patcy areas of GGO, JESSICA PNA, less c/f cardiogenic pulmonary edema.  - Patient upgraded to Medical ICU 10/23/22 with abrupt respiratory decline requiring NIPPV, improved with high dose IV steroids, plasmapheresis, emergent hemodialysis.   - Unable to perform bronchoscopy in the Medical ICU due to tenuous respiratory status  Hypoxia has resolved    Plan:  - weaned to room air  - continue management of underlying triggers with steroid and immunosuppressants  - incentive spirometry and respiratory hygiene    Lymphadenopathy of head and neck  - Has bilateral neck gland swelling with tenderness; consulted ENT  - No surgical intervention indicated   - Adenopathy likely reactive in nature   - Recommend further workup if it does not resolve within next 2 weeks   - 11/30-- Adenopathy is still present especially right submandibular region    Hyponatremia  - thought to be 2/2 SIADH initially- received NaCl tabs  - NaCl tabs removed  - Na stable on HD      Other specified anemias  In the setting of GI bleed with melena  - Evaluated by GI, see GI bleed documentation  - Last transfusion 10/28, Hgb slowly trending down since then  - Hgb 6.9 on 11/5    Plan  - Monitor CBC Daily   - Treat with pRBC transfusion PRN Hgb <7  - Transfused 3u pRBC    Current CBC reviewed-    Recent Labs   Lab 12/01/22  0344 12/02/22  0423 12/03/22  0313   HGB 8.5* 8.7* 8.6*       Chronic diastolic heart failure  Pulmonary Hypertension due to left heart disease  TTE (10/2022) EF 65%, G1DD  Home meds: Lisinopril, Toprol    Plan  - Active volume control with intermittent Hemodialysis per Nephrology   - Daily weights (standing if tolerated)  - Strict I/Os  - Fluid restriction 1.5 day  - Cardiac diet w/ 2 g Na restriction    Hyperkalemia  - resolved    Primary hypertension  BP Readings from  Last 1 Encounters:   12/04/22 (!) 121/59     - Patient was unable to tolerate PO mediations, all meds to be administered via NG tube until removed on 11/15.  - Will continue to monitor blood pressure trend closely and adjust antihypertensive regimen as clinically indicated and tolerated.    amLODIPine, carvediloL, hydrALAZINE; doses decreased on 12/4 due to low BP with extreme fatigue and weakness          Hypothyroid  - Continue synthroid 25 mcg  - TSH 0.585 10/27/22      VTE Risk Mitigation (From admission, onward)           Ordered     heparin (porcine) injection 1,000 Units  As needed (PRN)         11/29/22 0857     heparin (porcine) injection 1,000 Units  As needed (PRN)         11/22/22 0832     heparin, porcine (PF) 100 unit/mL injection flush 500 Units  As needed (PRN)         11/17/22 1343     heparin, porcine (PF) 100 unit/mL injection flush 500 Units  As needed (PRN)         11/10/22 1430     heparin (porcine) injection 1,000 Units  As needed (PRN)         11/09/22 1601     heparin (porcine) injection 1,000 Units  As needed (PRN)         11/08/22 0854     Place sequential compression device  Until discontinued         10/25/22 1731     IP VTE HIGH RISK PATIENT  Once         10/17/22 1354     Reason for No Pharmacological VTE Prophylaxis  Once        Question:  Reasons:  Answer:  Risk of Bleeding    10/17/22 1354                  High Risk Conditions:    Patient has a condition that poses threat to life and bodily function: Acute Renal Failure      I have completed this tele-visit with the assistance of a telepresenter.    The attending portion of this evaluation, treatment, and documentation was performed per Haleigh Parks MD via Telemedicine AudioVisual using the secure [employer] software platform with 2 way audio/video. The provider was located off-site and the patient is located in the hospital. The aforementioned video software was utilized to document the relevant history and physical exam    Haleigh BAPTISTE  MD Charly  Department of Hospital Medicine   [facility] - Intensive Care ([place])

## 2022-12-06 NOTE — PROGRESS NOTES
[facility] ([place] [address])  Nephrology  Progress Note    Patient Name: Kristin Goodman  MRN: [id number]  Admission Date: 10/17/2022  Hospital Length of Stay: 50 days  Attending Provider: Haleigh Parks MD   Primary Care Physician: Lori Hernandez MD  Principal Problem:Microscopic polyangiitis    Subjective:     HPI: Kristin Goodman is a 77 year old female with hypertension, hypothyroidism, HFpEF who presents for cough, shortness of breath, and weakness.  Patient initially presented to ER on 09/24 with hemoptysis and imaging concerning for multilobar pneumonia at which time she was discharged on a 10 day course of levofloxacin.  Patient initially had some improvement but began having worsening symptoms on 10/14.  Patient describes multiple fevers and progressive shortness of breath.  She continues to have intermittent hemoptysis.  Patient with worsening leukocytosis and limited clinical improvement despite broad-spectrum antibiotics.  She remains on cefepime.  Patient is a noted to have baseline creatinine of 1 as recent as 8/2022 but progressive worsening of creatinine in early October.  On admission patient with creatinine of 1.6 (10/17) with subsequent progression and creatinine of 3.0 at time of consultation (10/23).  Nephrology consulted for acute kidney injury.      Interval History: Patient evaluated at bedside. No acute events overnight. Refers feeling well, no complaints this AM.     Review of patient's allergies indicates:   Allergen Reactions    Ampicillin     Peaches [peach (prunus persica)] Other (See Comments)     Pt unable to state type of reaction. Information obtained from daughter who states she was informed of allergy from patient.    Penicillins      Other reaction(s): Hives    Sulfa (sulfonamide antibiotics) Rash and Hives     Current Facility-Administered Medications   Medication Frequency    0.9%  NaCl infusion Once    acetaminophen tablet 650 mg Q4H PRN    albuterol-ipratropium 2.5  mg-0.5 mg/3 mL nebulizer solution 3 mL Q4H PRN    aluminum-magnesium hydroxide-simethicone 200-200-20 mg/5 mL suspension 30 mL QID PRN    amLODIPine tablet 5 mg Daily    atovaquone 750 mg/5 mL oral liquid 1,500 mg Daily    bisacodyL suppository 10 mg Daily PRN    carvediloL tablet 12.5 mg BID    cloNIDine tablet 0.1 mg Q8H PRN    dextrose 10% bolus 125 mL PRN    dextrose 10% bolus 250 mL PRN    epoetin hira injection 2,880 Units Every Mon, Wed, Fri    glucagon (human recombinant) injection 1 mg PRN    glucose chewable tablet 16 g PRN    glucose chewable tablet 24 g PRN    heparin (porcine) injection 1,000 Units PRN    heparin (porcine) injection 1,000 Units PRN    heparin (porcine) injection 1,000 Units PRN    heparin, porcine (PF) 100 unit/mL injection flush 500 Units PRN    heparin, porcine (PF) 100 unit/mL injection flush 500 Units PRN    hydrALAZINE tablet 50 mg Q8H    insulin aspart U-100 pen 1-10 Units PRN    levothyroxine tablet 25 mcg Before breakfast    melatonin tablet 6 mg Nightly PRN    methocarbamoL tablet 500 mg TID PRN    metoclopramide HCl injection 5 mg Q6H PRN    naloxone 0.4 mg/mL injection 0.02 mg PRN    ondansetron injection 4 mg Q8H PRN    pantoprazole EC tablet 40 mg BID AC    predniSONE tablet 15 mg with lunch    Followed by    [START ON 12/18/2022] predniSONE tablet 12.5 mg with lunch    Followed by    [START ON 1/1/2023] predniSONE tablet 10 mg with lunch    [START ON 1/15/2023] predniSONE tablet 5 mg Daily    prochlorperazine injection Soln 5 mg Q6H PRN    quetiapine split tablet 12.5 mg QHS    simethicone chewable tablet 80 mg QID PRN    sodium chloride 0.9% 250 mL flush bag 1 time in Clinic[facility]    sodium chloride 0.9% bolus 250 mL PRN    sodium chloride 0.9% bolus 250 mL PRN    sodium chloride 0.9% flush 10 mL PRN    sodium chloride 0.9% flush 10 mL PRN    sodium chloride 0.9% flush 10 mL PRN    sodium chloride 0.9% flush 10 mL PRN    sucralfate tablet 1 g TID PRN    white petrolatum 41  % ointment Daily     Facility-Administered Medications Ordered in Other Encounters   Medication Frequency    celecoxib capsule 400 mg Once    fentaNYL 50 mcg/mL injection  mcg PRN    LIDOcaine (PF) 10 mg/ml (1%) injection 10 mg Once PRN    LIDOcaine (PF) 10 mg/ml (1%) injection 10 mg Once    midazolam (VERSED) 1 mg/mL injection 0.5-4 mg PRN    ropivacaine 0.2% Nimbus PainPRO Pump infusion 500 ML Continuous       Objective:     Vital Signs (Most Recent):  Temp: 96.9 °F (36.1 °C) (12/06/22 1228)  Pulse: 71 (12/06/22 1228)  Resp: 18 (12/06/22 0340)  BP: 127/60 (12/06/22 1228)  SpO2: 100 % (12/06/22 1228)  O2 Device (Oxygen Therapy): room air (12/06/22 0340)   Vital Signs (24h Range):  Temp:  [96.9 °F (36.1 °C)-98.7 °F (37.1 °C)] 96.9 °F (36.1 °C)  Pulse:  [68-83] 71  Resp:  [17-18] 18  SpO2:  [95 %-100 %] 100 %  BP: (127-150)/(60-65) 127/60     Weight: 67 kg (147 lb 11.3 oz) (12/06/22 0500)  Body mass index is 27.91 kg/m².  Body surface area is 1.7 meters squared.    I/O last 3 completed shifts:  In: -   Out: 200 [Urine:200]    Physical Exam  Vitals and nursing note reviewed.   Constitutional:       General: She is awake.      Appearance: She is well-developed. She is ill-appearing. She is not toxic-appearing or diaphoretic.   HENT:      Head: Normocephalic and atraumatic.      Right Ear: External ear normal.      Left Ear: External ear normal.      Nose:      Comments: + NGT     Mouth/Throat:      Mouth: Mucous membranes are dry.   Eyes:      General: Lids are normal. No scleral icterus.        Right eye: No discharge.         Left eye: No discharge.   Cardiovascular:      Rate and Rhythm: Normal rate and regular rhythm.   Pulmonary:      Effort: Pulmonary effort is normal.      Breath sounds: Normal breath sounds. No wheezing or rhonchi.   Abdominal:      General: Bowel sounds are normal.      Palpations: Abdomen is soft.      Tenderness: There is no abdominal tenderness.   Musculoskeletal:         General: No  "deformity.      Cervical back: Normal range of motion and neck supple. No rigidity or tenderness.      Right lower leg: No edema.      Left lower leg: No edema.   Skin:     General: Skin is warm and dry.   Neurological:      General: No focal deficit present.      Mental Status: She is alert and oriented to person, place, and time. Mental status is at baseline.   Psychiatric:         Attention and Perception: Attention normal.         Behavior: Behavior normal.       Significant Labs:  CBC:   Recent Labs   Lab 12/06/22  0204   WBC 8.87   RBC 2.75*   HGB 7.9*   HCT 25.6*   *   MCV 93   MCH 28.7   MCHC 30.9*       CMP:   Recent Labs   Lab 12/06/22  0204   GLU 74   CALCIUM 7.7*      K 4.6   CO2 23      BUN 48*   CREATININE 7.2*       All labs within the past 24 hours have been reviewed.       Assessment/Plan:     * Microscopic polyangiitis  See "Primary pauci-immune necrotizing and crescentic glomerulonephritis"      Anemia due to chronic kidney disease  EPO TIW    Primary pauci-immune necrotizing and crescentic glomerulonephritis  Kidney biopsy (10/26): pauci immune necrotizing and crescentic glomerulonephritis   s/p IV Solumedrol 1000 mg x3 doses.  PLEX 10/26, 10/27, 10/29, 10/30, 11/1, 11/2, 11/3 (prior to Rituxan)  Rituximab 375 mg/m^2 (600 mg) on 10/27 11/3, 11/10 and 11/17 (completed 4 doses)  Cyclophosphamide 7.5 mg/kg on 10/27 and 11/10 ( every 14 days ); has completed 2 doses; may need another dose of cyclophosphamide 6 weeks after her last dose (around December 22, 2022)  Serum BUN/Cr slowly trending up; will continue monitoring daily for dialysis needs  Now following PEXIVAS steroid taper; currently on Prednisone 15mg PO daily   Sodium bicarb 650mg PO BID   Continue Atovaquone for prophylaxis   Pt still needs dialysis for clearance about once or twice per week; please obtain consult for tunneled dialysis catheter placement   SW for outpatient HD chair   Strict I/Os and chart   Avoid " nephrotoxic agents as much as possible  No emergent need for hemodialysis today; will continue evaluation on a daily basis            James Gomez MD  Nephrology  [facility] - Intensive Care ([facility]    ATTENDING PHYSICIAN ATTESTATION  I have personally verified the history and examined the patient. I thoroughly reviewed the demographic, clinical, laboratorial and imaging information available in medical records. I agree with the assessment and recommendations provided by the subspecialty resident who was under my supervision.

## 2022-12-07 PROBLEM — R42 DIZZY SPELLS: Status: ACTIVE | Noted: 2022-12-07

## 2022-12-07 LAB
ANION GAP SERPL CALC-SCNC: 14 MMOL/L (ref 8–16)
BASOPHILS # BLD AUTO: 0.02 K/UL (ref 0–0.2)
BASOPHILS NFR BLD: 0.2 % (ref 0–1.9)
BUN SERPL-MCNC: 61 MG/DL (ref 8–23)
CALCIUM SERPL-MCNC: 7.9 MG/DL (ref 8.7–10.5)
CHLORIDE SERPL-SCNC: 103 MMOL/L (ref 95–110)
CO2 SERPL-SCNC: 21 MMOL/L (ref 23–29)
CREAT SERPL-MCNC: 8.4 MG/DL (ref 0.5–1.4)
DIFFERENTIAL METHOD: ABNORMAL
EOSINOPHIL # BLD AUTO: 0 K/UL (ref 0–0.5)
EOSINOPHIL NFR BLD: 0 % (ref 0–8)
ERYTHROCYTE [DISTWIDTH] IN BLOOD BY AUTOMATED COUNT: 15.2 % (ref 11.5–14.5)
EST. GFR  (NO RACE VARIABLE): 4.6 ML/MIN/1.73 M^2
GLUCOSE SERPL-MCNC: 90 MG/DL (ref 70–110)
HCT VFR BLD AUTO: 28.4 % (ref 37–48.5)
HGB BLD-MCNC: 8.8 G/DL (ref 12–16)
IMM GRANULOCYTES # BLD AUTO: 0.16 K/UL (ref 0–0.04)
IMM GRANULOCYTES NFR BLD AUTO: 1.5 % (ref 0–0.5)
INR PPP: 1.1 (ref 0.8–1.2)
LYMPHOCYTES # BLD AUTO: 2 K/UL (ref 1–4.8)
LYMPHOCYTES NFR BLD: 17.9 % (ref 18–48)
MAGNESIUM SERPL-MCNC: 1.9 MG/DL (ref 1.6–2.6)
MCH RBC QN AUTO: 28.7 PG (ref 27–31)
MCHC RBC AUTO-ENTMCNC: 31 G/DL (ref 32–36)
MCV RBC AUTO: 93 FL (ref 82–98)
MONOCYTES # BLD AUTO: 0.7 K/UL (ref 0.3–1)
MONOCYTES NFR BLD: 6.3 % (ref 4–15)
NEUTROPHILS # BLD AUTO: 8.1 K/UL (ref 1.8–7.7)
NEUTROPHILS NFR BLD: 74.1 % (ref 38–73)
NRBC BLD-RTO: 0 /100 WBC
PHOSPHATE SERPL-MCNC: 4.9 MG/DL (ref 2.7–4.5)
PLATELET # BLD AUTO: 126 K/UL (ref 150–450)
PMV BLD AUTO: 11.3 FL (ref 9.2–12.9)
POCT GLUCOSE: 112 MG/DL (ref 70–110)
POCT GLUCOSE: 134 MG/DL (ref 70–110)
POCT GLUCOSE: 164 MG/DL (ref 70–110)
POCT GLUCOSE: 92 MG/DL (ref 70–110)
POTASSIUM SERPL-SCNC: 4.6 MMOL/L (ref 3.5–5.1)
PROTHROMBIN TIME: 11.4 SEC (ref 9–12.5)
RBC # BLD AUTO: 3.07 M/UL (ref 4–5.4)
SODIUM SERPL-SCNC: 138 MMOL/L (ref 136–145)
WBC # BLD AUTO: 10.93 K/UL (ref 3.9–12.7)

## 2022-12-07 PROCEDURE — 85610 PROTHROMBIN TIME: CPT | Performed by: STUDENT IN AN ORGANIZED HEALTH CARE EDUCATION/TRAINING PROGRAM

## 2022-12-07 PROCEDURE — 99233 PR SUBSEQUENT HOSPITAL CARE,LEVL III: ICD-10-PCS | Mod: 95,,, | Performed by: INTERNAL MEDICINE

## 2022-12-07 PROCEDURE — 97110 THERAPEUTIC EXERCISES: CPT | Mod: CQ

## 2022-12-07 PROCEDURE — 63600175 PHARM REV CODE 636 W HCPCS: Performed by: INTERNAL MEDICINE

## 2022-12-07 PROCEDURE — 83735 ASSAY OF MAGNESIUM: CPT | Performed by: STUDENT IN AN ORGANIZED HEALTH CARE EDUCATION/TRAINING PROGRAM

## 2022-12-07 PROCEDURE — 85025 COMPLETE CBC W/AUTO DIFF WBC: CPT | Performed by: STUDENT IN AN ORGANIZED HEALTH CARE EDUCATION/TRAINING PROGRAM

## 2022-12-07 PROCEDURE — 99233 SBSQ HOSP IP/OBS HIGH 50: CPT | Mod: 95,,, | Performed by: INTERNAL MEDICINE

## 2022-12-07 PROCEDURE — 25000003 PHARM REV CODE 250: Performed by: INTERNAL MEDICINE

## 2022-12-07 PROCEDURE — 63600175 PHARM REV CODE 636 W HCPCS: Mod: JG | Performed by: INTERNAL MEDICINE

## 2022-12-07 PROCEDURE — 20600001 HC STEP DOWN PRIVATE ROOM

## 2022-12-07 PROCEDURE — 84100 ASSAY OF PHOSPHORUS: CPT | Performed by: STUDENT IN AN ORGANIZED HEALTH CARE EDUCATION/TRAINING PROGRAM

## 2022-12-07 PROCEDURE — 25000003 PHARM REV CODE 250: Performed by: HOSPITALIST

## 2022-12-07 PROCEDURE — 97116 GAIT TRAINING THERAPY: CPT | Mod: CQ

## 2022-12-07 PROCEDURE — 80048 BASIC METABOLIC PNL TOTAL CA: CPT | Performed by: STUDENT IN AN ORGANIZED HEALTH CARE EDUCATION/TRAINING PROGRAM

## 2022-12-07 RX ORDER — MECLIZINE HCL 12.5 MG 12.5 MG/1
25 TABLET ORAL 3 TIMES DAILY PRN
Status: DISCONTINUED | OUTPATIENT
Start: 2022-12-07 | End: 2022-12-13 | Stop reason: HOSPADM

## 2022-12-07 RX ADMIN — LEVOTHYROXINE SODIUM 25 MCG: 25 TABLET ORAL at 05:12

## 2022-12-07 RX ADMIN — HYDRALAZINE HYDROCHLORIDE 50 MG: 50 TABLET ORAL at 09:12

## 2022-12-07 RX ADMIN — QUETIAPINE FUMARATE 12.5 MG: 25 TABLET ORAL at 09:12

## 2022-12-07 RX ADMIN — PREDNISONE 15 MG: 5 TABLET ORAL at 11:12

## 2022-12-07 RX ADMIN — PANTOPRAZOLE SODIUM 40 MG: 40 TABLET, DELAYED RELEASE ORAL at 05:12

## 2022-12-07 RX ADMIN — PANTOPRAZOLE SODIUM 40 MG: 40 TABLET, DELAYED RELEASE ORAL at 03:12

## 2022-12-07 RX ADMIN — HYDRALAZINE HYDROCHLORIDE 50 MG: 50 TABLET ORAL at 05:12

## 2022-12-07 RX ADMIN — CARVEDILOL 12.5 MG: 12.5 TABLET, FILM COATED ORAL at 10:12

## 2022-12-07 RX ADMIN — ERYTHROPOIETIN 2880 UNITS: 3000 INJECTION, SOLUTION INTRAVENOUS; SUBCUTANEOUS at 12:12

## 2022-12-07 RX ADMIN — AMLODIPINE BESYLATE 5 MG: 5 TABLET ORAL at 10:12

## 2022-12-07 RX ADMIN — CARVEDILOL 12.5 MG: 12.5 TABLET, FILM COATED ORAL at 09:12

## 2022-12-07 RX ADMIN — ATOVAQUONE 1500 MG: 750 SUSPENSION ORAL at 11:12

## 2022-12-07 RX ADMIN — MECLIZINE HYDROCHLORIDE 25 MG: 12.5 TABLET ORAL at 03:12

## 2022-12-07 NOTE — ASSESSMENT & PLAN NOTE
As of 10/26/22 strongly positive MPO Ab (in contrast to borderline positive PR3), consistent with clinical picture of microscopic polyangiitis with pulmonary, and renal involvement after presenting with acute hypoxemic respiratory failure, hemoptysis, and acute renal failure.  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   3)  CHANGES SUGGESTIVE OF FOCAL ACUTE PYELONEPHRITIS.   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 Currently on oral steroid taper   - plan for 20mg IV daily on 11/6 for 14 days (last dose of 20mg equivalent is 11/20/2022); completing oral Prednisone per  PEXIVAS steroid taper  - apheresis: underwent PLEX 10/26, 10/27, 10/30, 11/1, 11/2, 11/3  - rituximab every 7 days for 4 doses: Dose #1: 10/27, #2 11/3, #3 11/10, and #4 given 11/17  - cyclophosphamide every 14 days for 2 doses: 10/27, 11/10  - Opportunistic Infection ppx: atovaquone 1500mg PO daily  - GI bleed prophylaxis: pantoprazole 40mg daily

## 2022-12-07 NOTE — ASSESSMENT & PLAN NOTE
Occurring intermittently during hospitalization but also when patient was younger  -associated with nausea, weakness; shifting eye movements present  -meclizine prn  -neurology evaluation due to recent vasculitis diagnosis

## 2022-12-07 NOTE — ASSESSMENT & PLAN NOTE
In the setting of GI bleed with melena  - Evaluated by GI, see GI bleed documentation  - Last transfusion 10/28, Hgb slowly trending down since then  - Hgb 6.9 on 11/5    Plan  - Monitor CBC Daily   - Treat with pRBC transfusion PRN Hgb <7  - Transfused 3u pRBC    Current CBC reviewed-    Recent Labs   Lab 12/05/22  0448 12/06/22  0204 12/07/22  0440   HGB 8.3* 7.9* 8.8*

## 2022-12-07 NOTE — PT/OT/SLP PROGRESS
"Occupational Therapy      Patient Name:  Kristin Goodman   MRN:  [id number]    Patient not seen today secondary to  (pt politely declined to participate.  She had an emesis bag nearby while asleep with HOB elevated and when she awoke said, "I'm having a bit of a dizzy spell."). Will follow-up as scheduled per OT POC.    12/7/2022  "

## 2022-12-07 NOTE — ASSESSMENT & PLAN NOTE
- Has bilateral neck gland swelling with tenderness; consulted ENT  - No surgical intervention indicated   - Adenopathy likely reactive in nature   - Recommend further workup if it does not resolve within next 2 weeks   - 11/30-- Adenopathy is still present especially right submandibular region  - ENT f/u

## 2022-12-07 NOTE — PLAN OF CARE
Pt AAOx4. RA.R Trialysis intact. MARGUERITE midline; flushed and saline locked. Blood glucose continues to be monitored before meals and at bedtime. Remains on renal diet w/ lactose restriction. Pt scheduled for IR tomorrow for tunnel cath plaement w/o port. Bed in lowest position, wheels locked, siderails up x3, bed alarm on and call light within reach. No c/o pain voiced/expressed. No s/s of distress noted. Monitoring continues.

## 2022-12-07 NOTE — PT/OT/SLP PROGRESS
"Physical Therapy Treatment    Patient Name:  Kristin Goodman   MRN:  [id number]    Recommendations:     Discharge Recommendations: nursing facility, skilled  Discharge Equipment Recommendations: bedside commode, walker, rolling  Barriers to discharge: None    Assessment:     Kristin Goodman is a 77 y.o. female admitted with a medical diagnosis of Microscopic polyangiitis.  She presents with the following impairments/functional limitations: weakness, impaired endurance, gait instability, impaired functional mobility, decreased ROM.  Pt was agreeable and tolerated session fairly well. Pt increase gait distance with very slow obey with RW and CGA-SBA. Pt completed seated therex with rest break in-between exercises. Pt is progressing well and continues to benefit from therapy to improve functional endurance, strength, and mobility.     Rehab Prognosis: Good; patient would benefit from acute skilled PT services to address these deficits and reach maximum level of function.    Recent Surgery: Procedure(s) (LRB):  EGD (ESOPHAGOGASTRODUODENOSCOPY) (N/A) 23 Days Post-Op    Plan:     During this hospitalization, patient to be seen 3 x/week to address the identified rehab impairments via gait training, therapeutic activities, therapeutic exercises, neuromuscular re-education and progress toward the following goals:    Plan of Care Expires:  12/28/22    Subjective     Chief Complaint: anxious about procedure   Patient/Family Comments/goals: "i'll try"  Pain/Comfort:  Pain Rating 1: 0/10  Pain Rating Post-Intervention 1: 0/10      Objective:     Communicated with RN prior to session.  Patient found up in chair with  (no active lines) upon PT entry to room.     General Precautions: Standard, fall  Orthopedic Precautions: N/A  Braces: N/A  Respiratory Status: Room air     Functional Mobility:  Bed Mobility:   Not assessed, pt found and returned to bedside chair   Transfers:   Sit <> Stand Transfer: moderate assistance with rolling " walker   Verbal cues to scoot forward and hand placement for safety   Gait:  Pt ambulated ~45 ft with  CGA-closeSBA  and rolling walker.  Gait Deviation(s): occasional unsteady gait, decreased step length, flexed posture, and very slow obey  Verbal/tactile cues for upright posture and gaze direction  Multiple turns completed and no LOB noted    AM-PAC 6 CLICK MOBILITY  Turning over in bed (including adjusting bedclothes, sheets and blankets)?: 4  Sitting down on and standing up from a chair with arms (e.g., wheelchair, bedside commode, etc.): 3  Moving from lying on back to sitting on the side of the bed?: 3  Moving to and from a bed to a chair (including a wheelchair)?: 3  Need to walk in hospital room?: 3  Climbing 3-5 steps with a railing?: 2  Basic Mobility Total Score: 18     Treatment & Education:  Seated BLE therex x15 reps: heel/toe raises, LAQ, marching, and hip abd/add with pillow  Patient educated on role of therapy, goals of session, and benefits of out of bed mobility.   Instructed on use of call button and importance of calling nursing staff for assistance with mobility   Questions/concerns addressed within PTA scope of practice  Pt verbalized understanding.  Whiteboard Updated    Patient left up in chair with all lines intact and call button in reach..    GOALS:   Multidisciplinary Problems       Physical Therapy Goals          Problem: Physical Therapy    Goal Priority Disciplines Outcome Goal Variances Interventions   Physical Therapy Goal     PT, PT/OT Ongoing, Progressing     Description: Goals remain appropriate and extended to: 2022     Patient will increase functional independence with mobility by performin. Supine to sit with contact guard assistance  2. Sit to supine with contact guard assistance MET   2a. Sit to supine independently  3. Sit to stand transfer with minimum assistance MET   3a. STS supvn LRAD  4. Gait  x 40 feet with minimum assistance using LRAD as  needed  5. Lower extremity exercise program x10 reps per handout, with independence                        Time Tracking:     PT Received On: 12/07/22  PT Start Time: 1039     PT Stop Time: 1102  PT Total Time (min): 23 min     Billable Minutes: Gait Training 12 and Therapeutic Exercise 11    Treatment Type: Treatment  PT/PTA: PTA     PTA Visit Number: 3     12/07/2022

## 2022-12-07 NOTE — ASSESSMENT & PLAN NOTE
Due to microscopic polyangiitis. Remains on hemodialysis intermittently.   - Baseline creatinine 1.0-1.2.   - New onset proteinuria/hematuria.   - Renal biopsy completed and   - Trialysis in place for intermittent Hemodialysis    Plan  - Nephrology consulted, appreciate management  - management of MPA as noted separately  - Renal function panel daily  - renally dose all medications  - intermittent Hemodialysis as indicated, per Nephrology--> have decided to pursue outpatient HD; IR consulted for tunneled catheter placement; case management to assist with HD chair     Recent Labs   Lab 12/05/22  0448 12/06/22  0204 12/07/22  0440   BUN 45* 48* 61*   CREATININE 6.4* 7.2* 8.4*

## 2022-12-07 NOTE — SUBJECTIVE & OBJECTIVE
This encounter was provided through telemedicine.  Patient was transferred to the telemedicine service on:  12/03/2022   The patient location is: [id number] A admitted 10/17/2022 10:12 AM.    Interval History/Overnight Events:     Patient able to provide adequate history - daughter at bedside.    Feeling well - up in chair eating breakfast and denies complaints; both in agreement with HD line placement    Called in afternoon by nurse and seen virtually with sister present; patient has been having episodes of dizziness associated with head bobbing, nausea and increased weakness; her sister states has been occurring several times a week; patient says she is had this when she was younger and she would just sit still and let the spells pass    Labs reviewed - plt 126    Review of Systems   Constitutional:  Positive for activity change, appetite change and fatigue. Negative for fever.   Respiratory:  Negative for shortness of breath.    Cardiovascular:  Positive for leg swelling.   Gastrointestinal:  Positive for nausea. Negative for abdominal pain, diarrhea and vomiting.   Musculoskeletal:  Negative for arthralgias.   Neurological:  Positive for dizziness.      Inpatient Medications:  Scheduled Meds:   sodium chloride 0.9%   Intravenous Once    amLODIPine  5 mg Oral Daily    atovaquone  1,500 mg Oral Daily    carvediloL  12.5 mg Oral BID    epoetin hira (PROCRIT) injection  50 Units/kg Subcutaneous Every Mon, Wed, Fri    hydrALAZINE  50 mg Oral Q8H    levothyroxine  25 mcg Oral Before breakfast    pantoprazole  40 mg Oral BID AC    predniSONE  15 mg Oral with lunch    Followed by    [START ON 12/18/2022] predniSONE  12.5 mg Oral with lunch    Followed by    [START ON 1/1/2023] predniSONE  10 mg Oral with lunch    [START ON 1/15/2023] predniSONE  5 mg Oral Daily    QUEtiapine  12.5 mg Oral QHS    sodium chloride 0.9% flush bag IVPB   Intravenous 1 time in Clinic/HOD    white petrolatum   Topical (Top) Daily      Continuous Infusions:  PRN Meds:.acetaminophen, albuterol-ipratropium, aluminum-magnesium hydroxide-simethicone, bisacodyL, cloNIDine, dextrose 10%, dextrose 10%, glucagon (human recombinant), glucose, glucose, heparin (porcine), heparin (porcine), heparin (porcine), heparin, porcine (PF), heparin, porcine (PF), insulin aspart U-100, meclizine, melatonin, methocarbamoL, metoclopramide HCl, naloxone, ondansetron, prochlorperazine, simethicone, sodium chloride 0.9%, sodium chloride 0.9%, sodium chloride 0.9%, sodium chloride 0.9%, sodium chloride 0.9%, sodium chloride 0.9%, sucralfate      Objective:     Temp:  [97 °F (36.1 °C)-98.4 °F (36.9 °C)] 98.4 °F (36.9 °C)  Pulse:  [70-83] 70  Resp:  [18-20] 18  SpO2:  [94 %-100 %] 100 %  BP: (127-147)/(58-70) 145/65      Intake/Output Summary (Last 24 hours) at 12/7/2022 1519  Last data filed at 12/6/2022 2105  Gross per 24 hour   Intake 300 ml   Output --   Net 300 ml          Body mass index is 27.62 kg/m².    Physical Exam  Vitals and nursing note reviewed.   Constitutional:       General: She is not in acute distress.     Appearance: Normal appearance.   HENT:      Head: Normocephalic and atraumatic.   Eyes:      General: No scleral icterus.        Right eye: No discharge.         Left eye: No discharge.      Extraocular Movements:      Right eye: Abnormal extraocular motion present.      Left eye: Abnormal extraocular motion present.   Cardiovascular:      Rate and Rhythm: Normal rate.   Pulmonary:      Effort: Pulmonary effort is normal. No tachypnea or respiratory distress.   Musculoskeletal:      Right lower leg: Edema present.      Left lower leg: Edema present.   Neurological:      General: No focal deficit present.      Mental Status: She is alert and oriented to person, place, and time.      Cranial Nerves: No cranial nerve deficit.      Motor: No weakness.   Psychiatric:         Mood and Affect: Mood and affect normal.         Speech: Speech normal.          Behavior: Behavior is cooperative.         Thought Content: Thought content normal.        Labs:  Recent Results (from the past 24 hour(s))   POCT glucose    Collection Time: 12/06/22  4:31 PM   Result Value Ref Range    POCT Glucose 190 (H) 70 - 110 mg/dL   CBC Auto Differential    Collection Time: 12/07/22  4:40 AM   Result Value Ref Range    WBC 10.93 3.90 - 12.70 K/uL    RBC 3.07 (L) 4.00 - 5.40 M/uL    Hemoglobin 8.8 (L) 12.0 - 16.0 g/dL    Hematocrit 28.4 (L) 37.0 - 48.5 %    MCV 93 82 - 98 fL    MCH 28.7 27.0 - 31.0 pg    MCHC 31.0 (L) 32.0 - 36.0 g/dL    RDW 15.2 (H) 11.5 - 14.5 %    Platelets 126 (L) 150 - 450 K/uL    MPV 11.3 9.2 - 12.9 fL    Immature Granulocytes 1.5 (H) 0.0 - 0.5 %    Gran # (ANC) 8.1 (H) 1.8 - 7.7 K/uL    Immature Grans (Abs) 0.16 (H) 0.00 - 0.04 K/uL    Lymph # 2.0 1.0 - 4.8 K/uL    Mono # 0.7 0.3 - 1.0 K/uL    Eos # 0.0 0.0 - 0.5 K/uL    Baso # 0.02 0.00 - 0.20 K/uL    nRBC 0 0 /100 WBC    Gran % 74.1 (H) 38.0 - 73.0 %    Lymph % 17.9 (L) 18.0 - 48.0 %    Mono % 6.3 4.0 - 15.0 %    Eosinophil % 0.0 0.0 - 8.0 %    Basophil % 0.2 0.0 - 1.9 %    Differential Method Automated    Basic Metabolic Panel    Collection Time: 12/07/22  4:40 AM   Result Value Ref Range    Sodium 138 136 - 145 mmol/L    Potassium 4.6 3.5 - 5.1 mmol/L    Chloride 103 95 - 110 mmol/L    CO2 21 (L) 23 - 29 mmol/L    Glucose 90 70 - 110 mg/dL    BUN 61 (H) 8 - 23 mg/dL    Creatinine 8.4 (H) 0.5 - 1.4 mg/dL    Calcium 7.9 (L) 8.7 - 10.5 mg/dL    Anion Gap 14 8 - 16 mmol/L    eGFR 4.6 (A) >60 mL/min/1.73 m^2   Protime-INR (PT)    Collection Time: 12/07/22  4:40 AM   Result Value Ref Range    Prothrombin Time 11.4 9.0 - 12.5 sec    INR 1.1 0.8 - 1.2   Magnesium    Collection Time: 12/07/22  4:40 AM   Result Value Ref Range    Magnesium 1.9 1.6 - 2.6 mg/dL   Phosphorus    Collection Time: 12/07/22  4:40 AM   Result Value Ref Range    Phosphorus 4.9 (H) 2.7 - 4.5 mg/dL   POCT glucose    Collection Time: 12/07/22  8:13 AM    Result Value Ref Range    POCT Glucose 92 70 - 110 mg/dL   POCT glucose    Collection Time: 12/07/22 12:13 PM   Result Value Ref Range    POCT Glucose 112 (H) 70 - 110 mg/dL        Lab Results   Component Value Date    [id number] Not Detected 10/24/2022       Recent Labs   Lab 12/05/22 0448 12/06/22  0204 12/07/22  0440   WBC 9.44 8.87 10.93   LYMPH 20.3  1.9 22.8  2.0 17.9*  2.0   HGB 8.3* 7.9* 8.8*   HCT 26.9* 25.6* 28.4*   * 121* 126*       Recent Labs   Lab 12/05/22 0448 12/06/22  0204 12/07/22  0440    138 138   K 4.2 4.6 4.6    103 103   CO2 22* 23 21*   BUN 45* 48* 61*   CREATININE 6.4* 7.2* 8.4*   GLU 63* 74 90   CALCIUM 7.7* 7.7* 7.9*   MG 1.9 2.0 1.9   PHOS 4.7* 4.9* 4.9*       Recent Labs   Lab 12/07/22  0440   INR 1.1          No results for input(s): DDIMER, FERRITIN, CRP, LDH, BNP, TROPONINI, CPK in the last 72 hours.    Invalid input(s): PROCALCITONIN    All labs within the last 24 hours were reviewed.     Microbiology:  Microbiology Results (last 7 days)       ** No results found for the last 168 hours. **              Imaging  ECG Results              EKG 12-lead (Final result)  Result time 10/17/22 14:26:15      Final result by Interface, Lab In [facility] (10/17/22 14:26:15)                   Narrative:    Test Reason : R06.02,    Vent. Rate : 093 BPM     Atrial Rate : 093 BPM     P-R Int : 126 ms          QRS Dur : 070 ms      QT Int : 356 ms       P-R-T Axes : 048 052 030 degrees     QTc Int : 442 ms    Normal sinus rhythm  Normal ECG  When compared with ECG of 14-OCT-2022 14:26,  Limb lead reversal has been corrected  Confirmed by Jc Barbosa MD ([id number]) on 10/17/2022 2:26:04 PM    Referred By: [email or web address]   SELF           Confirmed By:Jc Barbosa MD                                    Results for orders placed during the hospital encounter of 10/17/22    Echo    Interpretation Summary  · The left ventricle is normal in size with normal systolic function. The estimated  ejection fraction is 65%.  · Normal right ventricular size with normal right ventricular systolic function.  · Grade I left ventricular diastolic dysfunction.  · Mild tricuspid regurgitation.  · There is pulmonary hypertension. The estimated PA systolic pressure is 56 mmHg.  · Normal to low central venous pressure (3 mmHg).      X-Ray Abdomen AP 1 View  Narrative: EXAMINATION:  XR ABDOMEN AP 1 VIEW    CLINICAL HISTORY:  NGT placement; Dysphagia, unspecified    TECHNIQUE:  AP View(s) of the abdomen was performed.    COMPARISON:  No 11/20/2022 ne    FINDINGS:  Feeding tube in the stomach.  No significant bowel dilatation.  Impression: See above    Electronically signed by: Ej Strickland MD  Date:    11/14/2022  Time:    11:02      All imaging within the last 24 hours was reviewed.       Discharge Planning   ANTHONY: 12/9/2022     Code Status: Full Code   Is the patient medically ready for discharge?: No    Reason for patient still in hospital (select all that apply): Patient trending condition, Laboratory test, Treatment, Consult recommendations, and Pending disposition  Discharge Plan A: Skilled Nursing Facility   Discharge Delays: None known at this time

## 2022-12-07 NOTE — ASSESSMENT & PLAN NOTE
BP Readings from Last 1 Encounters:   12/07/22 (!) 145/65     - Patient was unable to tolerate PO mediations, all meds to be administered via NG tube until removed on 11/15.  - Will continue to monitor blood pressure trend closely and adjust antihypertensive regimen as clinically indicated and tolerated.  - amLODIPine, carvediloL, hydrALAZINE; doses decreased on 12/4 due to low BP with extreme fatigue and weakness especially on HD days  --will continue to monitor and adjust regimen as necessary

## 2022-12-07 NOTE — PROGRESS NOTES
[facility] - Intensive Care ([place] [id number])  [facility]  Telemedicine Progress Note    Patient Name: Kristin Goodman  MRN: [id number]  Patient Class: IP- Inpatient   Admission Date: 10/17/2022  Length of Stay: 51 days  Attending Physician: Haleigh Parks MD  Primary Care Provider: Lori Hernandez MD          Subjective:     Principal Problem:Microscopic polyangiitis        HPI:  Kristin Goodman is a 77 y.o. female with a past medical history of HTN, hypothyroidism, HFpEF, and osteoarthritis of the arm who has presented to the ED for cough, SOB, and weakness. Daughter is present at the bedside. Patient presented to the ED on 9/24 with hemoptysis, CT and CXR showed high suspicion for multilobar pneumonia. Patient was discharged with a 10-day course of levofloxacin and an albuterol inhaler. Patient followed up with her PCP on 10/4 with slight improvement in symptoms and went back to work. Patient continued to have worsening symptoms and presented to the ED again on 10/14 for evaluation and no interventions were done. Patient endorses fevers over the last few days up to 102.4 F with progressive SOB. She endorses hemoptysis with moderate amount of blood, generalized weakness, productive cough, SOB, and loss of appetite. Denies chest pain, nausea, vomiting, abdominal pain, or urinary changes.    ED: hypertensive up to 219/93 and tachycardic up to 117. Oxygen saturation on 92% on RA, placed on 5L NC with sats >97%. CBC remarkable for leukocytosis of 16.03 and Hb 7.7. K 3.3. Cr 1.6, baseline ~1.0. . Troponin 0.061. COVID and flu negative. EKG NSR. CT chest with contrast pending at time of admission. Given home amlodipine and lisinopril. Given 500mL NS, IV azithromycin, cefepime and vanc.       Overview/Hospital Course:  HM Course:  Ms. Goodman was admitted to Hospital Medicine for management of multifocal pneumonia and acute hypoxemic respiratory failure after presenting to the ED with fevers, cough,  hemoptysis, and dyspnea. She required escalation to the MICU due to abrupt decline in her respiratory status, associated with hemoptysis and requiring NIPPV. CT chest showed bilateral patchy ground glass opacities. Labs additionally were notable for acute renal failure on CKD3. Pulmonology was consulted while under the care of [facility] and felt this picture was consistent with diffuse alveolar hemorrhage.     ICU Course:   Her workup revealed a strongly positive MPO Ab consistent with clinical picture of microscopic polyangiitis with pulmonary and renal involvement. She underwent Trialysis catheter placement 10/25/2022 in the Medical ICU. 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     Rheumatology was consulted, extensive workup revealed MITUL + 1:2560 homogenous, +dsDNA, normal complements, PR3 1.2 (slight +),  (+), cANCA + 1:80, pANCA neg, GBMAb neg, trace cryos. Due to concern for MPA, she was started on pulse dose IV methylprednisolone 1000mg for 3 days, and plasmapheresis under the guidance of Transfusion Medicine. Patient was placed on steroid taper and completed PLEX. She additionally received rituximab and cyclophosphamide. OI prophylaxis was provided with atovaquone due to a sulfa allergy.     Nephrology was consulted for evaluation of acute renal failure in the setting of MPA. She did require SLED in the ICU for renal clearance and remains on intermittent hemodialysis. She is expected to continue HD once discharged.     Her acute hypoxemic respiratory failure improved and she was weaned off of supplemental oxygen. She stepped down to [facility] 10/28.     HM Course:  She underwent MBBS for evaluation of dysphagia, which showed global delayed  initiation of swallow and aspiration with thin liquids. Due to concern for possible prior stroke, head imaging was completed and negative for acute finding. She was NPO with NG tube. ENT was consulted for evaluation of dysphagia and cervical adenopathy.  Patient did not tolerate laryngoscopy; unable to visualize vocal cords but given her lack of hoarseness, they did not suspect vocal fold paresis/paralysis. MRI recommended by rheum to fully rule out any potential neurological cause of the patient's dysphagia; MRI demonstrated remote left thalamic lacunar type infarct and punctate remote left cerebellar infarcts.  She continued to work with speech therapy.  EGD completed 11/14 without abnormalities.  Per GI, Suspect some aspect of esophageal dysmotility in setting of critical illness. No further testing at this time.  Per SLP, diet advanced on 11/15 and NG tube removed.    Her other chronic medical conditions including hypothyroidism, diastolic CHF, and hypertension were managed with her home medications, with dose adjustments as needed.     Patient was noted to have downtrending Hg 11/17- required 1u pRBC. Rheum continued to follow for further steroid dosing. Patient was started on 2g NaCl tab TID for SIADH. SLP recommended mechanical soft diet with thin liquids 11/17. Na normalized 11/18. Hg improved to 8.5 following 1u pRBC. Patient had some hyperkalemia- started on scheduled lokelma. Patient continued to have improved UOP; however, BUN: Cr ratio uptrended. Nephro decided to hang off on HD as patient was having 500-600mL/24hr. He was started on scheduled NaHCO3 as ewll. Patient underwent HD 11/23. Patient had drop in Hg once again- 6.5 on 11/25. Given 1u pRBC (2nd unit). Underwent HD 11/30. Nephrology following to re-evaluate daily for need for HD and permacath placement.         This encounter was provided through telemedicine.  Patient was transferred to the telemedicine service on:  12/03/2022   The patient  location is: [id number] A admitted 10/17/2022 10:12 AM.    Interval History/Overnight Events:     Patient able to provide adequate history - daughter at bedside.    Feeling well - up in chair eating breakfast and denies complaints; both in agreement with HD line placement    Called in afternoon by nurse and seen virtually with sister present; patient has been having episodes of dizziness associated with head bobbing, nausea and increased weakness; her sister states has been occurring several times a week; patient says she is had this when she was younger and she would just sit still and let the spells pass    Labs reviewed - plt 126    Review of Systems   Constitutional:  Positive for activity change, appetite change and fatigue. Negative for fever.   Respiratory:  Negative for shortness of breath.    Cardiovascular:  Positive for leg swelling.   Gastrointestinal:  Positive for nausea. Negative for abdominal pain, diarrhea and vomiting.   Musculoskeletal:  Negative for arthralgias.   Neurological:  Positive for dizziness.      Inpatient Medications:  Scheduled Meds:   sodium chloride 0.9%   Intravenous Once    amLODIPine  5 mg Oral Daily    atovaquone  1,500 mg Oral Daily    carvediloL  12.5 mg Oral BID    epoetin hira (PROCRIT) injection  50 Units/kg Subcutaneous Every Mon, Wed, Fri    hydrALAZINE  50 mg Oral Q8H    levothyroxine  25 mcg Oral Before breakfast    pantoprazole  40 mg Oral BID AC    predniSONE  15 mg Oral with lunch    Followed by    [START ON 12/18/2022] predniSONE  12.5 mg Oral with lunch    Followed by    [START ON 1/1/2023] predniSONE  10 mg Oral with lunch    [START ON 1/15/2023] predniSONE  5 mg Oral Daily    QUEtiapine  12.5 mg Oral QHS    sodium chloride 0.9% flush bag IVPB   Intravenous 1 time in Clinic/HOD    white petrolatum   Topical (Top) Daily     Continuous Infusions:  PRN Meds:.acetaminophen, albuterol-ipratropium, aluminum-magnesium hydroxide-simethicone, bisacodyL,  cloNIDine, dextrose 10%, dextrose 10%, glucagon (human recombinant), glucose, glucose, heparin (porcine), heparin (porcine), heparin (porcine), heparin, porcine (PF), heparin, porcine (PF), insulin aspart U-100, meclizine, melatonin, methocarbamoL, metoclopramide HCl, naloxone, ondansetron, prochlorperazine, simethicone, sodium chloride 0.9%, sodium chloride 0.9%, sodium chloride 0.9%, sodium chloride 0.9%, sodium chloride 0.9%, sodium chloride 0.9%, sucralfate      Objective:     Temp:  [97 °F (36.1 °C)-98.4 °F (36.9 °C)] 98.4 °F (36.9 °C)  Pulse:  [70-83] 70  Resp:  [18-20] 18  SpO2:  [94 %-100 %] 100 %  BP: (127-147)/(58-70) 145/65      Intake/Output Summary (Last 24 hours) at 12/7/2022 1519  Last data filed at 12/6/2022 2105  Gross per 24 hour   Intake 300 ml   Output --   Net 300 ml          Body mass index is 27.62 kg/m².    Physical Exam  Vitals and nursing note reviewed.   Constitutional:       General: She is not in acute distress.     Appearance: Normal appearance.   HENT:      Head: Normocephalic and atraumatic.   Eyes:      General: No scleral icterus.        Right eye: No discharge.         Left eye: No discharge.      Extraocular Movements:      Right eye: Abnormal extraocular motion present.      Left eye: Abnormal extraocular motion present.   Cardiovascular:      Rate and Rhythm: Normal rate.   Pulmonary:      Effort: Pulmonary effort is normal. No tachypnea or respiratory distress.   Musculoskeletal:      Right lower leg: Edema present.      Left lower leg: Edema present.   Neurological:      General: No focal deficit present.      Mental Status: She is alert and oriented to person, place, and time.      Cranial Nerves: No cranial nerve deficit.      Motor: No weakness.   Psychiatric:         Mood and Affect: Mood and affect normal.         Speech: Speech normal.         Behavior: Behavior is cooperative.         Thought Content: Thought content normal.        Labs:  Recent Results (from the past  24 hour(s))   POCT glucose    Collection Time: 12/06/22  4:31 PM   Result Value Ref Range    POCT Glucose 190 (H) 70 - 110 mg/dL   CBC Auto Differential    Collection Time: 12/07/22  4:40 AM   Result Value Ref Range    WBC 10.93 3.90 - 12.70 K/uL    RBC 3.07 (L) 4.00 - 5.40 M/uL    Hemoglobin 8.8 (L) 12.0 - 16.0 g/dL    Hematocrit 28.4 (L) 37.0 - 48.5 %    MCV 93 82 - 98 fL    MCH 28.7 27.0 - 31.0 pg    MCHC 31.0 (L) 32.0 - 36.0 g/dL    RDW 15.2 (H) 11.5 - 14.5 %    Platelets 126 (L) 150 - 450 K/uL    MPV 11.3 9.2 - 12.9 fL    Immature Granulocytes 1.5 (H) 0.0 - 0.5 %    Gran # (ANC) 8.1 (H) 1.8 - 7.7 K/uL    Immature Grans (Abs) 0.16 (H) 0.00 - 0.04 K/uL    Lymph # 2.0 1.0 - 4.8 K/uL    Mono # 0.7 0.3 - 1.0 K/uL    Eos # 0.0 0.0 - 0.5 K/uL    Baso # 0.02 0.00 - 0.20 K/uL    nRBC 0 0 /100 WBC    Gran % 74.1 (H) 38.0 - 73.0 %    Lymph % 17.9 (L) 18.0 - 48.0 %    Mono % 6.3 4.0 - 15.0 %    Eosinophil % 0.0 0.0 - 8.0 %    Basophil % 0.2 0.0 - 1.9 %    Differential Method Automated    Basic Metabolic Panel    Collection Time: 12/07/22  4:40 AM   Result Value Ref Range    Sodium 138 136 - 145 mmol/L    Potassium 4.6 3.5 - 5.1 mmol/L    Chloride 103 95 - 110 mmol/L    CO2 21 (L) 23 - 29 mmol/L    Glucose 90 70 - 110 mg/dL    BUN 61 (H) 8 - 23 mg/dL    Creatinine 8.4 (H) 0.5 - 1.4 mg/dL    Calcium 7.9 (L) 8.7 - 10.5 mg/dL    Anion Gap 14 8 - 16 mmol/L    eGFR 4.6 (A) >60 mL/min/1.73 m^2   Protime-INR (PT)    Collection Time: 12/07/22  4:40 AM   Result Value Ref Range    Prothrombin Time 11.4 9.0 - 12.5 sec    INR 1.1 0.8 - 1.2   Magnesium    Collection Time: 12/07/22  4:40 AM   Result Value Ref Range    Magnesium 1.9 1.6 - 2.6 mg/dL   Phosphorus    Collection Time: 12/07/22  4:40 AM   Result Value Ref Range    Phosphorus 4.9 (H) 2.7 - 4.5 mg/dL   POCT glucose    Collection Time: 12/07/22  8:13 AM   Result Value Ref Range    POCT Glucose 92 70 - 110 mg/dL   POCT glucose    Collection Time: 12/07/22 12:13 PM   Result Value  Ref Range    POCT Glucose 112 (H) 70 - 110 mg/dL        Lab Results   Component Value Date    [id number] Not Detected 10/24/2022       Recent Labs   Lab 12/05/22 0448 12/06/22  0204 12/07/22  0440   WBC 9.44 8.87 10.93   LYMPH 20.3  1.9 22.8  2.0 17.9*  2.0   HGB 8.3* 7.9* 8.8*   HCT 26.9* 25.6* 28.4*   * 121* 126*       Recent Labs   Lab 12/05/22  0448 12/06/22  0204 12/07/22  0440    138 138   K 4.2 4.6 4.6    103 103   CO2 22* 23 21*   BUN 45* 48* 61*   CREATININE 6.4* 7.2* 8.4*   GLU 63* 74 90   CALCIUM 7.7* 7.7* 7.9*   MG 1.9 2.0 1.9   PHOS 4.7* 4.9* 4.9*       Recent Labs   Lab 12/07/22  0440   INR 1.1          No results for input(s): DDIMER, FERRITIN, CRP, LDH, BNP, TROPONINI, CPK in the last 72 hours.    Invalid input(s): PROCALCITONIN    All labs within the last 24 hours were reviewed.     Microbiology:  Microbiology Results (last 7 days)       ** No results found for the last 168 hours. **              Imaging  ECG Results              EKG 12-lead (Final result)  Result time 10/17/22 14:26:15      Final result by Interface, Lab In [facility] (10/17/22 14:26:15)                   Narrative:    Test Reason : R06.02,    Vent. Rate : 093 BPM     Atrial Rate : 093 BPM     P-R Int : 126 ms          QRS Dur : 070 ms      QT Int : 356 ms       P-R-T Axes : 048 052 030 degrees     QTc Int : 442 ms    Normal sinus rhythm  Normal ECG  When compared with ECG of 14-OCT-2022 14:26,  Limb lead reversal has been corrected  Confirmed by Jc Barbosa MD ([id number]) on 10/17/2022 2:26:04 PM    Referred By: [email or web address]   SELF           Confirmed By:Jc Barbosa MD                                    Results for orders placed during the hospital encounter of 10/17/22    Echo    Interpretation Summary  · The left ventricle is normal in size with normal systolic function. The estimated ejection fraction is 65%.  · Normal right ventricular size with normal right ventricular systolic function.  · Grade I left  ventricular diastolic dysfunction.  · Mild tricuspid regurgitation.  · There is pulmonary hypertension. The estimated PA systolic pressure is 56 mmHg.  · Normal to low central venous pressure (3 mmHg).      X-Ray Abdomen AP 1 View  Narrative: EXAMINATION:  XR ABDOMEN AP 1 VIEW    CLINICAL HISTORY:  NGT placement; Dysphagia, unspecified    TECHNIQUE:  AP View(s) of the abdomen was performed.    COMPARISON:  No 11/20/2022 ne    FINDINGS:  Feeding tube in the stomach.  No significant bowel dilatation.  Impression: See above    Electronically signed by: Ej Strickland MD  Date:    11/14/2022  Time:    11:02      All imaging within the last 24 hours was reviewed.       Discharge Planning   ANTHONY: 12/9/2022     Code Status: Full Code   Is the patient medically ready for discharge?: No    Reason for patient still in hospital (select all that apply): Patient trending condition, Laboratory test, Treatment, Consult recommendations, and Pending disposition  Discharge Plan A: Skilled Nursing Facility   Discharge Delays: None known at this time          Assessment/Plan:      * Microscopic polyangiitis  As of 10/26/22 strongly positive MPO Ab (in contrast to borderline positive PR3), consistent with clinical picture of microscopic polyangiitis with pulmonary, and renal involvement after presenting with acute hypoxemic respiratory failure, hemoptysis, and acute renal failure.  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   3)  CHANGES SUGGESTIVE OF FOCAL ACUTE PYELONEPHRITIS.   4)  MILD-TO-MODERATE  ARTERIONEPHROSCLEROSIS   - Rheumatology consulted, appreciate evaluation and recommendations     - MITUL + 1:2560 homogenous, +dsDNA, normal complements     - PR3 1.2 (slight +),  (+)     - cANCA + 1:80, pANCA neg     - GBMAb neg, trace cryos  - Transfusion Medicine consulted for apheresis  - Nephrology consulted, see separate documentation for WILFREDO      Plan  - Steroids:    - s/p IV Solumedrol 1000 mg x3 doses. Currently on oral steroid taper   - plan for 20mg IV daily on 11/6 for 14 days (last dose of 20mg equivalent is 11/20/2022); completing oral Prednisone per  PEXIVAS steroid taper  - apheresis: underwent PLEX 10/26, 10/27, 10/30, 11/1, 11/2, 11/3  - rituximab every 7 days for 4 doses: Dose #1: 10/27, #2 11/3, #3 11/10, and #4 given 11/17  - cyclophosphamide every 14 days for 2 doses: 10/27, 11/10  - Opportunistic Infection ppx: atovaquone 1500mg PO daily  - GI bleed prophylaxis: pantoprazole 40mg daily     Dizzy spells  Occurring intermittently during hospitalization but also when patient was younger  -associated with nausea, weakness; shifting eye movements present  -meclizine prn  -neurology evaluation due to recent vasculitis diagnosis      Anemia due to chronic kidney disease  - 2/2 CKD  - Hg stable  - Monitor trend     Primary pauci-immune necrotizing and crescentic glomerulonephritis  Patient with acute kidney injury likely due to microscopic polyangiitis WILFREDO is currently stable. Labs reviewed- Renal function/electrolytes with Estimated Creatinine Clearance: 5 mL/min (A) (based on SCr of 8.4 mg/dL (H)). according to latest data. Monitor urine output and serial BMP and adjust therapy as needed. Avoid nephrotoxins and renally dose meds for GFR listed above.   Nephrology following and patient receives HD as indicated.       Gastric reflux  - PPI BID      High risk medications (not anticoagulants) long-term use  See history of vasculitis therapy      Anuria  - Improving urine output  - Nephrology  following- re-evaluating daily for need for Intermittent HD and permacath placement     Gastrointestinal hemorrhage with melena  Starting having hemoptysis and melena with associated acute blood loss anemia earlier in hospital stay. Patient with known internal hemorrhoids, mild sigmoid diverticulosis, history of gastritis and + H pylori (2012 EGD).   - GI consulted 10/19, unable to perform EGD due to instability and respiratory failure at the time    Plan  - PPI PO BID   - Monitor CBC closely for signs of recurrent bleed  - EGD w/no acute bleed seen  - Transfused 2u pRBC    Dysphagia  SLP following  MBSS showing global weakness in swallowing as well as aspiration with thin liquids.   ENT consulted but patient did not tolerate laryngoscopy     Plan   - Dysphagia possibly 2/2 previous stroke  - Briefly required NGT, worked SLP  - NGT removed- being treated with nystatin swish and swallow for thrush  - GI consulted as well for concern of oropharyngeal candidiasis--> Low suspicion for esophageal candidiasis without odynophagia however can have if white plaques have been seen on oropharynx, ok to start fluconazole empirically for possible esophageal candidiasis  - EGD on 11/14 without abnormality; per GI, Suspect some aspect of esophageal dysmotility in setting of critical illness. No further testing at this time.  - Advanced to renal diet 11/22    Moderate malnutrition  Nutrition consulted. Most recent weight and BMI monitored-     Malnutrition (Moderate to Severe)  Weight Loss (Malnutrition): 7.5% in 3 months    Measurements:  Wt Readings from Last 1 Encounters:   12/07/22 66.3 kg (146 lb 2.6 oz)   Body mass index is 27.62 kg/m².    Recommendations: Recommendation/Intervention: 1.  Goals: Meet % EEN, EPN by RD f/u date    Patient has been screened and assessed by RD. RD will follow patient.      Acute renal failure on dialysis  Due to microscopic polyangiitis. Remains on hemodialysis intermittently.   - Baseline  creatinine 1.0-1.2.   - New onset proteinuria/hematuria.   - Renal biopsy completed and   - Trialysis in place for intermittent Hemodialysis    Plan  - Nephrology consulted, appreciate management  - management of MPA as noted separately  - Renal function panel daily  - renally dose all medications  - intermittent Hemodialysis as indicated, per Nephrology--> have decided to pursue outpatient HD; IR consulted for tunneled catheter placement; case management to assist with HD chair     Recent Labs   Lab 12/05/22  0448 12/06/22  0204 12/07/22  0440   BUN 45* 48* 61*   CREATININE 6.4* 7.2* 8.4*       Acute hypoxemic respiratory failure  Multifocal pneumonia  Hemoptysis  Dyspnea  Diffuse Alveolar Hemorrahge    In the setting of a new diagnosis of microscopic polyangiitis, presented with fevers, dyspnea,.  - Chest imaging was consistent with diffuse alveolar hemorrhage.  CT chest wc 10/17 with JESSICA perihilar dense consolidations w/ peripheral patcy areas of GGO, JESSICA PNA, less c/f cardiogenic pulmonary edema.  - Patient upgraded to Medical ICU 10/23/22 with abrupt respiratory decline requiring NIPPV, improved with high dose IV steroids, plasmapheresis, emergent hemodialysis.   - Unable to perform bronchoscopy in the Medical ICU due to tenuous respiratory status  Hypoxia has resolved    Plan:  - weaned to room air  - continue management of underlying triggers with steroid and immunosuppressants  - incentive spirometry and respiratory hygiene    Lymphadenopathy of head and neck  - Has bilateral neck gland swelling with tenderness; consulted ENT  - No surgical intervention indicated   - Adenopathy likely reactive in nature   - Recommend further workup if it does not resolve within next 2 weeks   - 11/30-- Adenopathy is still present especially right submandibular region  - ENT f/u    Hyponatremia  - thought to be 2/2 SIADH initially- received NaCl tabs  - NaCl tabs removed  - Na stable on HD      Other specified anemias  In the  setting of GI bleed with melena  - Evaluated by GI, see GI bleed documentation  - Last transfusion 10/28, Hgb slowly trending down since then  - Hgb 6.9 on 11/5    Plan  - Monitor CBC Daily   - Treat with pRBC transfusion PRN Hgb <7  - Transfused 3u pRBC    Current CBC reviewed-    Recent Labs   Lab 12/05/22  0448 12/06/22  0204 12/07/22  0440   HGB 8.3* 7.9* 8.8*       Chronic diastolic heart failure  Pulmonary 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  - Will continue to monitor blood pressure trend closely and adjust antihypertensive regimen as clinically indicated and tolerated.  - amLODIPine, carvediloL, hydrALAZINE; doses decreased on 12/4 due to low BP with extreme fatigue and weakness especially on HD days  --will continue to monitor and adjust regimen as necessary            Hypothyroid  - Continue synthroid 25 mcg  - TSH 0.585 10/27/22      VTE Risk Mitigation (From admission, onward)         Ordered     heparin (porcine) injection 1,000 Units  As needed (PRN)         11/29/22 0857     heparin (porcine) injection 1,000 Units  As needed (PRN)         11/22/22 0832     heparin, porcine (PF) 100 unit/mL injection flush 500 Units  As needed (PRN)         11/17/22 1343     heparin, porcine (PF) 100 unit/mL injection flush 500 Units  As needed (PRN)         11/10/22 1430     heparin (porcine) injection 1,000 Units  As needed (PRN)         11/09/22 1601     heparin (porcine) injection 1,000 Units  As needed (PRN)         11/08/22 0854      Place sequential compression device  Until discontinued         10/25/22 1731     IP VTE HIGH RISK PATIENT  Once         10/17/22 1354     Reason for No Pharmacological VTE Prophylaxis  Once        Question:  Reasons:  Answer:  Risk of Bleeding    10/17/22 1354              High Risk Conditions:  Patient has a condition that poses threat to life and bodily function: Acute Renal Failure    I have completed this tele-visit without the assistance of a telepresenter.    The attending portion of this evaluation, treatment, and documentation was performed per Haleigh Parks MD via Telemedicine AudioVisual using the secure Touchmedia software platform with 2 way audio/video. The provider was located off-site and the patient is located in the hospital. The aforementioned video software was utilized to document the relevant history and physical exam    Haleigh Parks MD  Department of Hospital Medicine   [facility] - Intensive Care ([place]-)

## 2022-12-07 NOTE — ASSESSMENT & PLAN NOTE
Nutrition consulted. Most recent weight and BMI monitored-     Malnutrition (Moderate to Severe)  Weight Loss (Malnutrition): 7.5% in 3 months    Measurements:  Wt Readings from Last 1 Encounters:   12/07/22 66.3 kg (146 lb 2.6 oz)   Body mass index is 27.62 kg/m².    Recommendations: Recommendation/Intervention: 1.  Goals: Meet % EEN, EPN by RD f/u date    Patient has been screened and assessed by RD. RD will follow patient.

## 2022-12-07 NOTE — PLAN OF CARE
CM informed by Dr. Parks that the patient's daughter would like to discuss SNF options.  CM phoned patient's daughter, Roro Jolleyuben [phone].  Per daughter, she did not choose [facility] and would like to discuss options.   Daughter requests that a list be emailed to her [email or web address].     Rina Larry RN, CCRN-K, [facility]  Neuro-Critical Care   [phone]

## 2022-12-07 NOTE — ASSESSMENT & PLAN NOTE
Patient with acute kidney injury likely due to microscopic polyangiitis WILFREDO is currently stable. Labs reviewed- Renal function/electrolytes with Estimated Creatinine Clearance: 5 mL/min (A) (based on SCr of 8.4 mg/dL (H)). according to latest data. Monitor urine output and serial BMP and adjust therapy as needed. Avoid nephrotoxins and renally dose meds for GFR listed above.   Nephrology following and patient receives HD as indicated.

## 2022-12-08 LAB
ANION GAP SERPL CALC-SCNC: 12 MMOL/L (ref 8–16)
BASOPHILS # BLD AUTO: 0 K/UL (ref 0–0.2)
BASOPHILS NFR BLD: 0 % (ref 0–1.9)
BUN SERPL-MCNC: 69 MG/DL (ref 8–23)
CALCIUM SERPL-MCNC: 7.7 MG/DL (ref 8.7–10.5)
CHLORIDE SERPL-SCNC: 103 MMOL/L (ref 95–110)
CO2 SERPL-SCNC: 23 MMOL/L (ref 23–29)
CREAT SERPL-MCNC: 8.9 MG/DL (ref 0.5–1.4)
DIFFERENTIAL METHOD: ABNORMAL
EOSINOPHIL # BLD AUTO: 0 K/UL (ref 0–0.5)
EOSINOPHIL NFR BLD: 0.1 % (ref 0–8)
ERYTHROCYTE [DISTWIDTH] IN BLOOD BY AUTOMATED COUNT: 15.1 % (ref 11.5–14.5)
EST. GFR  (NO RACE VARIABLE): 4.3 ML/MIN/1.73 M^2
GLUCOSE SERPL-MCNC: 82 MG/DL (ref 70–110)
HCT VFR BLD AUTO: 26.4 % (ref 37–48.5)
HGB BLD-MCNC: 8.4 G/DL (ref 12–16)
IMM GRANULOCYTES # BLD AUTO: 0.17 K/UL (ref 0–0.04)
IMM GRANULOCYTES NFR BLD AUTO: 1.8 % (ref 0–0.5)
LYMPHOCYTES # BLD AUTO: 2 K/UL (ref 1–4.8)
LYMPHOCYTES NFR BLD: 21.3 % (ref 18–48)
MAGNESIUM SERPL-MCNC: 1.8 MG/DL (ref 1.6–2.6)
MCH RBC QN AUTO: 29.2 PG (ref 27–31)
MCHC RBC AUTO-ENTMCNC: 31.8 G/DL (ref 32–36)
MCV RBC AUTO: 92 FL (ref 82–98)
MONOCYTES # BLD AUTO: 0.8 K/UL (ref 0.3–1)
MONOCYTES NFR BLD: 8.2 % (ref 4–15)
NEUTROPHILS # BLD AUTO: 6.5 K/UL (ref 1.8–7.7)
NEUTROPHILS NFR BLD: 68.6 % (ref 38–73)
NRBC BLD-RTO: 0 /100 WBC
PHOSPHATE SERPL-MCNC: 4.9 MG/DL (ref 2.7–4.5)
PLATELET # BLD AUTO: 141 K/UL (ref 150–450)
PMV BLD AUTO: 11.8 FL (ref 9.2–12.9)
POCT GLUCOSE: 106 MG/DL (ref 70–110)
POCT GLUCOSE: 126 MG/DL (ref 70–110)
POCT GLUCOSE: 140 MG/DL (ref 70–110)
POCT GLUCOSE: 94 MG/DL (ref 70–110)
POTASSIUM SERPL-SCNC: 4.4 MMOL/L (ref 3.5–5.1)
RBC # BLD AUTO: 2.88 M/UL (ref 4–5.4)
SODIUM SERPL-SCNC: 138 MMOL/L (ref 136–145)
WBC # BLD AUTO: 9.43 K/UL (ref 3.9–12.7)

## 2022-12-08 PROCEDURE — 99223 PR INITIAL HOSPITAL CARE,LEVL III: ICD-10-PCS | Mod: ,,, | Performed by: PSYCHIATRY & NEUROLOGY

## 2022-12-08 PROCEDURE — 63600175 PHARM REV CODE 636 W HCPCS: Performed by: RADIOLOGY

## 2022-12-08 PROCEDURE — 20600001 HC STEP DOWN PRIVATE ROOM

## 2022-12-08 PROCEDURE — 80048 BASIC METABOLIC PNL TOTAL CA: CPT | Performed by: STUDENT IN AN ORGANIZED HEALTH CARE EDUCATION/TRAINING PROGRAM

## 2022-12-08 PROCEDURE — 83735 ASSAY OF MAGNESIUM: CPT

## 2022-12-08 PROCEDURE — 25000003 PHARM REV CODE 250: Performed by: INTERNAL MEDICINE

## 2022-12-08 PROCEDURE — 99233 PR SUBSEQUENT HOSPITAL CARE,LEVL III: ICD-10-PCS | Mod: 95,,, | Performed by: INTERNAL MEDICINE

## 2022-12-08 PROCEDURE — 99223 1ST HOSP IP/OBS HIGH 75: CPT | Mod: ,,, | Performed by: PSYCHIATRY & NEUROLOGY

## 2022-12-08 PROCEDURE — 25000003 PHARM REV CODE 250: Performed by: HOSPITALIST

## 2022-12-08 PROCEDURE — 36415 COLL VENOUS BLD VENIPUNCTURE: CPT

## 2022-12-08 PROCEDURE — 99233 SBSQ HOSP IP/OBS HIGH 50: CPT | Mod: 95,,, | Performed by: INTERNAL MEDICINE

## 2022-12-08 PROCEDURE — 85025 COMPLETE CBC W/AUTO DIFF WBC: CPT | Performed by: STUDENT IN AN ORGANIZED HEALTH CARE EDUCATION/TRAINING PROGRAM

## 2022-12-08 PROCEDURE — 84100 ASSAY OF PHOSPHORUS: CPT

## 2022-12-08 PROCEDURE — 63600175 PHARM REV CODE 636 W HCPCS: Performed by: INTERNAL MEDICINE

## 2022-12-08 RX ORDER — MIDAZOLAM HYDROCHLORIDE 1 MG/ML
INJECTION INTRAMUSCULAR; INTRAVENOUS
Status: COMPLETED | OUTPATIENT
Start: 2022-12-08 | End: 2022-12-08

## 2022-12-08 RX ORDER — FENTANYL CITRATE 50 UG/ML
INJECTION, SOLUTION INTRAMUSCULAR; INTRAVENOUS
Status: COMPLETED | OUTPATIENT
Start: 2022-12-08 | End: 2022-12-08

## 2022-12-08 RX ORDER — AMOXICILLIN 250 MG
1 CAPSULE ORAL 2 TIMES DAILY
Status: DISCONTINUED | OUTPATIENT
Start: 2022-12-08 | End: 2022-12-11

## 2022-12-08 RX ORDER — HEPARIN SODIUM 1000 [USP'U]/ML
INJECTION, SOLUTION INTRAVENOUS; SUBCUTANEOUS
Status: COMPLETED | OUTPATIENT
Start: 2022-12-08 | End: 2022-12-08

## 2022-12-08 RX ADMIN — PANTOPRAZOLE SODIUM 40 MG: 40 TABLET, DELAYED RELEASE ORAL at 04:12

## 2022-12-08 RX ADMIN — LEVOTHYROXINE SODIUM 25 MCG: 25 TABLET ORAL at 05:12

## 2022-12-08 RX ADMIN — PANTOPRAZOLE SODIUM 40 MG: 40 TABLET, DELAYED RELEASE ORAL at 05:12

## 2022-12-08 RX ADMIN — ATOVAQUONE 1500 MG: 750 SUSPENSION ORAL at 11:12

## 2022-12-08 RX ADMIN — HYDRALAZINE HYDROCHLORIDE 50 MG: 50 TABLET ORAL at 08:12

## 2022-12-08 RX ADMIN — HYDRALAZINE HYDROCHLORIDE 50 MG: 50 TABLET ORAL at 10:12

## 2022-12-08 RX ADMIN — QUETIAPINE FUMARATE 12.5 MG: 25 TABLET ORAL at 08:12

## 2022-12-08 RX ADMIN — SENNOSIDES AND DOCUSATE SODIUM 1 TABLET: 50; 8.6 TABLET ORAL at 08:12

## 2022-12-08 RX ADMIN — FENTANYL CITRATE 50 MCG: 0.05 INJECTION, SOLUTION INTRAMUSCULAR; INTRAVENOUS at 07:12

## 2022-12-08 RX ADMIN — CARVEDILOL 12.5 MG: 12.5 TABLET, FILM COATED ORAL at 11:12

## 2022-12-08 RX ADMIN — PREDNISONE 15 MG: 5 TABLET ORAL at 11:12

## 2022-12-08 RX ADMIN — CARVEDILOL 12.5 MG: 12.5 TABLET, FILM COATED ORAL at 08:12

## 2022-12-08 RX ADMIN — AMLODIPINE BESYLATE 5 MG: 5 TABLET ORAL at 11:12

## 2022-12-08 RX ADMIN — MIDAZOLAM HYDROCHLORIDE 1 MG: 1 INJECTION INTRAMUSCULAR; INTRAVENOUS at 07:12

## 2022-12-08 RX ADMIN — HYDRALAZINE HYDROCHLORIDE 50 MG: 50 TABLET ORAL at 05:12

## 2022-12-08 RX ADMIN — HEPARIN SODIUM 3600 UNITS: 1000 INJECTION, SOLUTION INTRAVENOUS; SUBCUTANEOUS at 08:12

## 2022-12-08 RX ADMIN — WHITE PETROLATUM: 1.75 OINTMENT TOPICAL at 11:12

## 2022-12-08 RX ADMIN — HYDRALAZINE HYDROCHLORIDE 50 MG: 50 TABLET ORAL at 04:12
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
## 2022-12-08 NOTE — NURSING
Spoke with MD and order was placed to remove trialysis line. Line removed from L neck. Pt was flat and pressure was held for 12 minutes with petroleum and 4x4 folder gauze. Covered with Tegaderm. Pt tolerated well with no issues. Pt remained flat in bed for 30 min after line was pulled at 1220 and is now up in bed eating lunch .

## 2022-12-08 NOTE — SUBJECTIVE & OBJECTIVE
Past Medical History:   Diagnosis Date    Acute blood loss anemia 10/17/2022    Acute hypoxemic respiratory failure 10/23/2022    Allergy     Back pain     Chronic diastolic heart failure 8/31/2020    Chronic diastolic heart failure 8/31/2020    Colon polyp     Disorder of kidney and ureter     H/O Bell's palsy 2006    after [place]    Helicobacter pylori (H. pylori)     HTN (hypertension)     Hypothyroid     OA (osteoarthritis)     DONAVAN (obstructive sleep apnea) 11/9/2020    Pneumonia due to other staphylococcus     Pulmonary HTN 8/31/2020    Sepsis due to pneumonia 10/17/2022    Septic shock 10/27/2022    Trouble in sleeping     Urinary incontinence        Past Surgical History:   Procedure Laterality Date    ARTHROSCOPIC CHONDROPLASTY OF KNEE JOINT Right 12/21/2021    Procedure: ARTHROSCOPY, KNEE, WITH CHONDROPLASTY;  Surgeon: Elly Sullivan MD;  Location: [facility];  Service: Orthopedics;  Laterality: Right;    COLONOSCOPY N/A 9/28/2020    Procedure: COLONOSCOPY;  Surgeon: Jaylan Flynn MD;  Location: [facility];  Service: Endoscopy;  Laterality: N/A;    ESOPHAGOGASTRODUODENOSCOPY N/A 11/14/2022    Procedure: EGD (ESOPHAGOGASTRODUODENOSCOPY);  Surgeon: Asaf Hahn MD;  Location: [facility] ([address] [place] [address]);  Service: Endoscopy;  Laterality: N/A;    KNEE ARTHROSCOPY W/ MENISCECTOMY Right 12/21/2021    Procedure: ARTHROSCOPY, KNEE, WITH MENISCECTOMY;  Surgeon: Elly Sullivan MD;  Location: [facility];  Service: Orthopedics;  Laterality: Right;  general, regional w catheter, adductor, josefina 50cc,     OOPHORECTOMY      SYNOVECTOMY OF KNEE Right 12/21/2021    Procedure: SYNOVECTOMY, KNEE;  Surgeon: Elly Sullivan MD;  Location: [facility];  Service: Orthopedics;  Laterality: Right;    TOTAL ABDOMINAL HYSTERECTOMY      21 yrs ago       Review of patient's allergies indicates:   Allergen Reactions    Ampicillin     Peaches [peach (prunus persica)] Other (See Comments)     Pt unable to state type of reaction.  Information obtained from daughter who states she was informed of allergy from patient.    Penicillins      Other reaction(s): Hives    Sulfa (sulfonamide antibiotics) Rash and Hives       Current Neurological Medications: none    Current Facility-Administered Medications on File Prior to Encounter   Medication    celecoxib capsule 400 mg    fentaNYL 50 mcg/mL injection  mcg    LIDOcaine (PF) 10 mg/ml (1%) injection 10 mg    LIDOcaine (PF) 10 mg/ml (1%) injection 10 mg    midazolam (VERSED) 1 mg/mL injection 0.5-4 mg    ropivacaine 0.2% [facility] PainPRO Pump infusion 500 ML     Current Outpatient Medications on File Prior to Encounter   Medication Sig    ACETAMINOPHEN (TYLENOL ARTHRITIS PAIN ORAL) Take 1 tablet by mouth once daily.     albuterol (VENTOLIN HFA) 90 mcg/actuation inhaler Inhale 2 puffs into the lungs every 6 (six) hours as needed for Shortness of Breath. Rescue    amLODIPine (NORVASC) 10 MG tablet Take 1 tablet (10 mg total) by mouth once daily.    aspirin 325 MG tablet Take 1 tablet at lunch daily starting after surgery for 6 weeks to prevent DVT.    diclofenac sodium (VOLTAREN) 1 % Gel Apply 2 g topically 4 (four) times daily. for 10 days    epinastine 0.05 % ophthalmic solution Place 1 drop into both eyes once daily.     EUTHYROX 25 mcg tablet Take 1 tablet by mouth once daily    fish,bora,flax oils-om3,6,9no1 (OMEGA 3-6-9) 1,200 mg Cap Take 1 each by mouth once daily.    fluticasone propionate (FLONASE) 50 mcg/actuation nasal spray 1 spray (50 mcg total) by Each Nostril route 2 (two) times daily.    FLUZONE HIGHDOSE QUAD 20-21  mcg/0.7 mL Syrg ADM 0.7ML IM UTD    hydrALAZINE (APRESOLINE) 100 MG tablet TAKE 1 TABLET BY MOUTH THREE TIMES DAILY    HYDROcodone-acetaminophen (NORCO) 5-325 mg per tablet Take 1 tablet by mouth every 6 (six) hours as needed.    ibuprofen (ADVIL,MOTRIN) 800 MG tablet Take 800 mg by mouth every 8 (eight) hours as needed.    levocetirizine (XYZAL) 5 MG tablet Take 1  tablet (5 mg total) by mouth every evening. For sinus    lisinopriL (PRINIVIL,ZESTRIL) 40 MG tablet Take 1 tablet by mouth once daily    meloxicam (MOBIC) 15 MG tablet Take 1 tablet (15 mg total) by mouth daily as needed (arthritis).    methocarbamoL (ROBAXIN) 500 MG Tab Take 1 tablet (500 mg total) by mouth 3 (three) times daily as needed (muscle spasms).    metoprolol succinate (TOPROL-XL) 50 MG 24 hr tablet Take 1 tablet by mouth once daily    mupirocin (BACTROBAN) 2 % ointment Apply topically 2 (two) times daily.    tiZANidine (ZANAFLEX) 4 MG tablet Take 1 tablet by mouth twice daily as needed     Family History       Problem Relation (Age of Onset)    Alzheimer's disease Sister    Arthritis Mother, Sister, Brother    Breast cancer Other    Cancer Sister, Brother    Diabetes Father    Early death Mother (56), Father (62), Sister (63), Brother (59)    Heart disease Sister, Brother    Hyperlipidemia Sister    Hypertension Mother, Father, Sister, Brother, Daughter    Prostate cancer Brother    Rheum arthritis Sister    Stroke Father    Vision loss Brother          Tobacco Use    Smoking status: Former     Packs/day: 0.50     Years: 6.00     Pack years: 3.00     Types: Cigarettes    Smokeless tobacco: Never    Tobacco comments:     Quit ~ 30 years ago   Substance and Sexual Activity    Alcohol use: No    Drug use: No    Sexual activity: Never     Partners: Male     Review of Systems   Constitutional:  Positive for activity change. Negative for appetite change, chills and fever.   HENT:  Negative for congestion, sore throat, trouble swallowing and voice change.    Eyes: Negative.    Respiratory:  Negative for cough and shortness of breath.    Cardiovascular:  Negative for chest pain and leg swelling.   Gastrointestinal:  Negative for abdominal distention, abdominal pain, constipation, nausea and vomiting.   Endocrine: Negative.    Genitourinary: Negative.    Musculoskeletal:  Negative for back pain and gait problem.    Skin: Negative.    Neurological:  Positive for dizziness, tremors and weakness. Negative for headaches.   Psychiatric/Behavioral: Negative.     Objective:     Vital Signs (Most Recent):  Temp: 97.9 °F (36.6 °C) (12/08/22 1148)  Pulse: 65 (12/08/22 1148)  Resp: 11 (12/08/22 0915)  BP: (!) 142/61 (12/08/22 1148)  SpO2: 96 % (12/08/22 1148)   Vital Signs (24h Range):  Temp:  [96.4 °F (35.8 °C)-98.1 °F (36.7 °C)] 97.9 °F (36.6 °C)  Pulse:  [60-84] 65  Resp:  [11-19] 11  SpO2:  [94 %-100 %] 96 %  BP: (120-158)/(58-72) 142/61     Weight: 66.3 kg (146 lb 2.6 oz)  Body mass index is 27.62 kg/m².    Physical Exam  Vitals and nursing note reviewed.   Constitutional:       Appearance: Normal appearance.   HENT:      Head: Normocephalic.      Nose: Nose normal.      Mouth/Throat:      Mouth: Mucous membranes are moist.   Eyes:      Extraocular Movements: Extraocular movements intact.      Pupils: Pupils are equal, round, and reactive to light.   Pulmonary:      Effort: No respiratory distress.   Musculoskeletal:         General: Normal range of motion.      Cervical back: Normal range of motion.   Neurological:      Mental Status: She is alert and oriented to person, place, and time.      Cranial Nerves: Cranial nerves 2-12 are intact. No cranial nerve deficit.      Sensory: No sensory deficit.      Motor: Weakness present.      Coordination: Coordination abnormal. Finger-Nose-Finger Test abnormal (dysmetria with FTN L>R).      Deep Tendon Reflexes:      Reflex Scores:       Bicep reflexes are 2+ (brisk throughout) on the right side and 2+ on the left side.       Brachioradialis reflexes are 2+ on the right side and 2+ on the left side.       Patellar reflexes are 2+ on the right side and 2+ on the left side.  Psychiatric:         Speech: Speech normal.       NEUROLOGICAL EXAMINATION:     MENTAL STATUS   Oriented to person, place, and time.   Follows 3 step commands.   Attention: normal. Concentration: normal.   Speech:  speech is normal   Level of consciousness: alert    CRANIAL NERVES   Cranial nerves II through XII intact.     CN III, IV, VI   Pupils are equal, round, and reactive to light.    MOTOR EXAM   Muscle bulk: normal  Overall muscle tone: normal  Right arm pronator drift: absent  Left arm pronator drift: absent    Strength   Right deltoid: 5/5  Left deltoid: 4/5  Right biceps: 5/5  Left biceps: 4/5  Right triceps: 5/5  Left triceps: 4/5  Right wrist extension: 5/5  Left wrist extension: 5/5  Right iliopsoas: 5/5  Left iliopsoas: 5/5  Right quadriceps: 5/5  Left quadriceps: 5/5  Right hamstrin/5  Left hamstrin/5  Right anterior tibial: 5/5  Left anterior tibial: 5/5  Right gastroc: 5/5  Left gastroc: 5/5    REFLEXES     Reflexes   Right brachioradialis: 2+  Left brachioradialis: 2+  Right biceps: 2+ (brisk throughout)  Left biceps: 2+  Right patellar: 2+  Left patellar: 2+  Right Lugo: present  Left Lugo: present    SENSORY EXAM   Light touch normal.     GAIT AND COORDINATION      Coordination   Finger to nose coordination: abnormal (dysmetria with FTN L>R)    Tremor   Resting tremor: absent  Intention tremor: present  Action tremor: left arm and right arm (could be medication related)    Significant Labs: All pertinent lab results from the past 24 hours have been reviewed.    Significant Imaging: I have reviewed and interpreted all pertinent imaging results/findings within the past 24 hours.
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
## 2022-12-08 NOTE — NURSING
Pt had an uneventful day up until approx 1500 when pt sister said that the pt was experiencing dizziness and double vision. Sister stated that this had been the 3rd or 4th time pt had this kind of episode over the past few weeks, but they didn't last very long and the RN was never notified about it. Did neuro assessment on pt, which there were no deficits and pt was alert and oriented to everything except time. Vitals were stable and at baseline. MD instructed to hold PO hydralazine due to the episode. MD was notified and called via room intercom and camera. Believed episode to be vertigo and neuro was consulted as well. Meclizine PRN 25 mg ordered and administered. Pt states that symptoms have subsided a bit, but not completely. Pt is in bed sleeping and stable. Will continue to monitor pt.

## 2022-12-08 NOTE — SUBJECTIVE & OBJECTIVE
Interval History: Patient evaluated at bedside. No acute events overnight. Scheduled for iHD treatment today.     Review of patient's allergies indicates:   Allergen Reactions    Ampicillin     Peaches [peach (prunus persica)] Other (See Comments)     Pt unable to state type of reaction. Information obtained from daughter who states she was informed of allergy from patient.    Penicillins      Other reaction(s): Hives    Sulfa (sulfonamide antibiotics) Rash and Hives     Current Facility-Administered Medications   Medication Frequency    0.9%  NaCl infusion Once    acetaminophen tablet 650 mg Q4H PRN    albuterol-ipratropium 2.5 mg-0.5 mg/3 mL nebulizer solution 3 mL Q4H PRN    aluminum-magnesium hydroxide-simethicone 200-200-20 mg/5 mL suspension 30 mL QID PRN    amLODIPine tablet 5 mg Daily    atovaquone 750 mg/5 mL oral liquid 1,500 mg Daily    bisacodyL suppository 10 mg Daily PRN    carvediloL tablet 12.5 mg BID    cloNIDine tablet 0.1 mg Q8H PRN    dextrose 10% bolus 125 mL PRN    dextrose 10% bolus 250 mL PRN    epoetin hira injection 2,880 Units Every Mon, Wed, Fri    glucagon (human recombinant) injection 1 mg PRN    glucose chewable tablet 16 g PRN    glucose chewable tablet 24 g PRN    heparin (porcine) injection 1,000 Units PRN    heparin (porcine) injection 1,000 Units PRN    heparin (porcine) injection 1,000 Units PRN    heparin, porcine (PF) 100 unit/mL injection flush 500 Units PRN    heparin, porcine (PF) 100 unit/mL injection flush 500 Units PRN    hydrALAZINE tablet 50 mg Q8H    insulin aspart U-100 pen 1-10 Units PRN    levothyroxine tablet 25 mcg Before breakfast    meclizine tablet 25 mg TID PRN    melatonin tablet 6 mg Nightly PRN    methocarbamoL tablet 500 mg TID PRN    metoclopramide HCl injection 5 mg Q6H PRN    naloxone 0.4 mg/mL injection 0.02 mg PRN    ondansetron injection 4 mg Q8H PRN    pantoprazole EC tablet 40 mg BID AC    predniSONE tablet 15 mg with lunch    Followed by    [START  ON 12/18/2022] predniSONE tablet 12.5 mg with lunch    Followed by    [START ON 1/1/2023] predniSONE tablet 10 mg with lunch    [START ON 1/15/2023] predniSONE tablet 5 mg Daily    prochlorperazine injection Soln 5 mg Q6H PRN    quetiapine split tablet 12.5 mg QHS    simethicone chewable tablet 80 mg QID PRN    sodium chloride 0.9% 250 mL flush bag 1 time in Clinic/HOD    sodium chloride 0.9% bolus 250 mL PRN    sodium chloride 0.9% bolus 250 mL PRN    sodium chloride 0.9% flush 10 mL PRN    sodium chloride 0.9% flush 10 mL PRN    sodium chloride 0.9% flush 10 mL PRN    sodium chloride 0.9% flush 10 mL PRN    sucralfate tablet 1 g TID PRN    white petrolatum 41 % ointment Daily     Facility-Administered Medications Ordered in Other Encounters   Medication Frequency    celecoxib capsule 400 mg Once    fentaNYL 50 mcg/mL injection  mcg PRN    LIDOcaine (PF) 10 mg/ml (1%) injection 10 mg Once PRN    LIDOcaine (PF) 10 mg/ml (1%) injection 10 mg Once    midazolam (VERSED) 1 mg/mL injection 0.5-4 mg PRN    ropivacaine 0.2% [facility] PainPRO Pump infusion 500 ML Continuous       Objective:     Vital Signs (Most Recent):  Temp: 97.3 °F (36.3 °C) (12/08/22 0436)  Pulse: 75 (12/08/22 0750)  Resp: 14 (12/08/22 0750)  BP: (!) 147/69 (12/08/22 0750)  SpO2: 100 % (12/08/22 0750)  O2 Device (Oxygen Therapy): room air (12/07/22 0429)   Vital Signs (24h Range):  Temp:  [97.3 °F (36.3 °C)-98.4 °F (36.9 °C)] 97.3 °F (36.3 °C)  Pulse:  [70-84] 75  Resp:  [14-19] 14  SpO2:  [94 %-100 %] 100 %  BP: (120-158)/(58-71) 147/69     Weight: 66.3 kg (146 lb 2.6 oz) (12/07/22 1200)  Body mass index is 27.62 kg/m².  Body surface area is 1.69 meters squared.    I/O last 3 completed shifts:  In: 300 [P.O.:300]  Out: -     Physical Exam  Vitals and nursing note reviewed.   Constitutional:       General: She is awake.      Appearance: She is well-developed. She is ill-appearing. She is not toxic-appearing or diaphoretic.   HENT:      Head:  Normocephalic and atraumatic.      Right Ear: External ear normal.      Left Ear: External ear normal.      Nose:      Comments: + NGT     Mouth/Throat:      Mouth: Mucous membranes are dry.   Eyes:      General: Lids are normal. No scleral icterus.        Right eye: No discharge.         Left eye: No discharge.   Cardiovascular:      Rate and Rhythm: Normal rate and regular rhythm.   Pulmonary:      Effort: Pulmonary effort is normal.      Breath sounds: Normal breath sounds. No wheezing or rhonchi.   Abdominal:      General: Bowel sounds are normal.      Palpations: Abdomen is soft.      Tenderness: There is no abdominal tenderness.   Musculoskeletal:         General: No deformity.      Cervical back: Normal range of motion and neck supple. No rigidity or tenderness.      Right lower leg: No edema.      Left lower leg: No edema.   Skin:     General: Skin is warm and dry.   Neurological:      General: No focal deficit present.      Mental Status: She is alert and oriented to person, place, and time. Mental status is at baseline.   Psychiatric:         Attention and Perception: Attention normal.         Behavior: Behavior normal.       Significant Labs:  CBC:   Recent Labs   Lab 12/08/22  0448   WBC 9.43   RBC 2.88*   HGB 8.4*   HCT 26.4*   *   MCV 92   MCH 29.2   MCHC 31.8*       CMP:   Recent Labs   Lab 12/08/22  0448   GLU 82   CALCIUM 7.7*      K 4.4   CO2 23      BUN 69*   CREATININE 8.9*       All labs within the past 24 hours have been reviewed.

## 2022-12-08 NOTE — PLAN OF CARE
Pt arrived to MPU room 4 for 1hr recovery s/p HD-Cath , no acute distress noted. VSS.  Dressing CDI. IV patent. Pt resting comfortably.

## 2022-12-08 NOTE — ASSESSMENT & PLAN NOTE
78 y/o F w PMH HTN, hypothyroidism, HFpEF who has a complicated hospital admission since 10/29/22 where she has been diagnosed with 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 She has had a couple of episodes but most severe was yesterday. Mostly they have been positional and she does have some history of vertigo. On exam patient has some Left sided weakness and dysmetria L>R w FTN that questions possible cortical/vascular pathology. She had MRI brain on 11/1 that showed remote L thalamic and L cerebellar infarct. If there was CNS involvement of her vasculitis she has already been treated for it and a repeat MRI brain will allow us to r/o new infarct vs vasculitis changes     Recommendations  -- Repeat MRI brain w/o contrast   -- If negative suspect that episodes are likely pre syncope vs vertigo   -- Continue with fall precautions  -- PT/OT   -- Orthostatic BP

## 2022-12-08 NOTE — PLAN OF CARE
Ihr IR recovery s/p HD cath placement complete. VSS. Dressing CDI. Pt ready for transport back to floor. Report called to RN.

## 2022-12-08 NOTE — NURSING
Pt had uneventful night. Vitals remained stable and at baseline. No complaints of pain or discomfort. Will continue to monitor pt remaining of shift.  Pt has been complaining of pain while urinating along with odor. UA placed by MD.

## 2022-12-08 NOTE — SUBJECTIVE & OBJECTIVE
Telemedicine  This service was provided by [facility].    Patient was transferred to [facility] on:  12/03/2022     Chief Complaint   Patient presents with    Multiple complaints     Seen 2 other times since sept, cont with now fever, cough with blood      The patient location is: [id number] A   Admitted 10/17/2022 10:12 AM    Interval History / Events Overnight:   The patient is able to provide adequate history. Additional history was obtained from past medical records. No significant events reported by Nursing.  Patient complains of dysuria. Symptoms have worsened since yesterday. Associated symptoms include: fatigue. Symptoms are increasing in severity.     Lab test(s) reviewed: H&H stable    Review of Systems   Constitutional:  Negative for fever.   Respiratory:  Negative for shortness of breath.      Objective:     Vital Signs (Most Recent):  Temp: 97.9 °F (36.6 °C) (12/08/22 1148)  Pulse: 65 (12/08/22 1148)  Resp: 11 (12/08/22 0915)  BP: (!) 142/61 (12/08/22 1148)  SpO2: 96 % (12/08/22 1148)   Vital Signs (24h Range):  Temp:  [96.4 °F (35.8 °C)-98.1 °F (36.7 °C)] 97.9 °F (36.6 °C)  Pulse:  [60-84] 65  Resp:  [11-19] 11  SpO2:  [94 %-100 %] 96 %  BP: (120-158)/(58-72) 142/61     Weight: 66.3 kg (146 lb 2.6 oz)  Body mass index is 27.62 kg/m².  No intake or output data in the 24 hours ending 12/08/22 1349   Physical Exam  Constitutional:       General: She is not in acute distress.     Appearance: Normal appearance.   Eyes:      General: Lids are normal. No scleral icterus.        Right eye: No discharge.         Left eye: No discharge.      Conjunctiva/sclera: Conjunctivae normal.   Neck:      Trachea: Phonation normal.   Cardiovascular:      Rate and Rhythm: Normal rate.      Comments: Monitor / Vital signs reviewed at time of visit  Pulmonary:      Effort: Pulmonary effort is normal. No tachypnea, accessory muscle usage or respiratory distress.   Abdominal:      General: There is no  distension.   Skin:     Coloration: Skin is not cyanotic.   Neurological:      Mental Status: She is alert. She is not disoriented.   Psychiatric:         Attention and Perception: Attention normal.         Mood and Affect: Affect normal.         Behavior: Behavior is cooperative.       Significant Labs:  Recent Labs   Lab 08/02/22  1156   HGBA1C 5.5     Recent Labs   Lab 12/08/22  1148 12/08/22  1606 12/08/22  1913   POCTGLUCOSE 106 126* 140*     Recent Labs   Lab 12/06/22  0204 12/07/22  0440 12/08/22 0448   WBC 8.87 10.93 9.43   HGB 7.9* 8.8* 8.4*   HCT 25.6* 28.4* 26.4*   * 126* 141*     Recent Labs   Lab 12/06/22  0204 12/07/22  0440 12/08/22 0448   GRAN 69.4  6.2 74.1*  8.1* 68.6  6.5   LYMPH 22.8  2.0 17.9*  2.0 21.3  2.0   MONO 6.1  0.5 6.3  0.7 8.2  0.8   EOS 0.0 0.0 0.0     Recent Labs   Lab 12/06/22  0204 12/07/22  0440 12/08/22 0448 12/08/22 0449    138 138  --    K 4.6 4.6 4.4  --     103 103  --    CO2 23 21* 23  --    BUN 48* 61* 69*  --    CREATININE 7.2* 8.4* 8.9*  --    GLU 74 90 82  --    CALCIUM 7.7* 7.9* 7.7*  --    MG 2.0 1.9  --  1.8   PHOS 4.9* 4.9*  --  4.9*     Recent Labs   Lab 12/07/22  0440   INR 1.1     Recent Labs   Lab 09/24/22  1120 10/14/22  1638 10/17/22  1200 10/23/22  1822 10/30/22  0426 11/25/22  0255   PROCAL 0.06 0.12  --   --   --   --    LACTATE 0.7  --  0.9 0.9  --   --    DDIMER  --  1.96*  --   --   --   --    FERRITIN  --   --   --   --  374* 588*     SARS-CoV2 (COVID-19) Qualitative PCR (no units)   Date Value   10/24/2022 Not Detected   02/14/2022 Not Detected   09/25/2020 Not Detected   05/21/2020 Not Detected     SARS-CoV-2 RNA, Amplification, Qual (no units)   Date Value   10/17/2022 Negative     POC Rapid COVID (no units)   Date Value   09/24/2022 Negative       ECG Results              EKG 12-lead (Final result)  Result time 10/17/22 14:26:15      Final result by Interface, Lab In [facility] (10/17/22 14:26:15)                    Narrative:    Test Reason : R06.02,    Vent. Rate : 093 BPM     Atrial Rate : 093 BPM     P-R Int : 126 ms          QRS Dur : 070 ms      QT Int : 356 ms       P-R-T Axes : 048 052 030 degrees     QTc Int : 442 ms    Normal sinus rhythm  Normal ECG  When compared with ECG of 14-OCT-2022 14:26,  Limb lead reversal has been corrected  Confirmed by Jc Barbosa MD ([id number]) on 10/17/2022 2:26:04 PM    Referred By: [email or web address]   SELF           Confirmed By:Jc Barbosa MD                                    Results for orders placed during the hospital encounter of 10/17/22    Echo    Interpretation Summary  · The left ventricle is normal in size with normal systolic function. The estimated ejection fraction is 65%.  · Normal right ventricular size with normal right ventricular systolic function.  · Grade I left ventricular diastolic dysfunction.  · Mild tricuspid regurgitation.  · There is pulmonary hypertension. The estimated PA systolic pressure is 56 mmHg.  · Normal to low central venous pressure (3 mmHg).      IR Tunneled Catheter Insert w/o Port  Narrative: EXAMINATION:  Tunneled central venous catheter placement    Procedural Personnel    Attending physician(s): Ryan    Fellow physician(s): None    Resident physician(s): None    Advanced practice provider(s): None    Pre-procedure diagnosis: ESRD    Post-procedure diagnosis: Same    Indication: Performance of hemodialysis    Additional clinical history: None    Complications: No immediate complications.    TECHNIQUE:  - Venous access with ultrasound guidance    - Tunneled central venous catheter insertion with fluoroscopic guidance    FINDINGS:  Pre-procedure    History and imaging of central venous access reviewed (QCDR): Yes    Consent: Informed consent for the procedure was obtained and time-out was performed prior to the procedure.    Prophylactic antibiotic administered: Within 1 hour of procedure start time or 2 hours for vancomycin or  fluoroquinolones    Preparation (MIPS): The site was prepared and draped using all elements of maximal sterile barrier technique including sterile gloves, sterile gown, cap, mask, large sterile sheet, sterile ultrasound probe cover, hand hygiene and cutaneous antisepsis with 2% chlorhexidine.    Medical reason for site preparation exception (MIPS): Not applicable    Anesthesia/sedation    Level of anesthesia/sedation: Moderate sedation (conscious sedation)    Anesthesia/sedation administered by: Independent trained observer under attending supervision with continuous monitoring of the patient's level of consciousness and physiologic status    Total intra-service sedation time (minutes): 15    Access    Local anesthesia was administered. The vessel was sonographically evaluated and determined to be patent. Real time ultrasound was used to visualize needle entry into the vessel and a permanent image was stored.    Vein accessed: Internal jugular vein    Access technique: Micropuncture set with 21 gauge needle    Catheter placement    An incision was made near the venous access site and the catheter was tunneled subcutaneously to the venous access site . The catheter was advanced via a peel-away sheath into the vein under fluoroscopic guidance. Catheter tip location was fluoroscopically verified and a permanent image was stored.    Catheter placed: Proceeding long-term HD catheter    Catheter size (Canadian): 15    Catheter cuff-to-tip or trimmed length (cm): 19    Catheter tip position: 2.5 VBUs below remy.    Unique Device Identifier (BREANNA): Not available    Catheter flush: Heparin (1000 units/mL)    Closure    A sterile dressing was applied.    Access site closure technique: Tissue adhesive    Catheter securement technique: Non-absorbable suture    Additional Details    Additional description of procedure: None    Equipment details: None    Specimens removed: None    Estimated blood loss (mL): Less than  10    Standardized report: [email or web address]    Attestation    Signer name: Rayn    I attest that I was present for the entire procedure. I reviewed the stored images and agree with the report as written.  Impression: Insertion of right -sided supradiaphragmatic dual- lumen tunneled dialysis catheter, with tip in the expected location of the cavoatrial junction.    Plan:    The catheter may be used immediately.    _______________________________________________________________    Electronically signed by: Musa Davis MD  Date:    12/08/2022  Time:    09:53      Labs and Imaging within the last 24 hours listed above were reviewed.       Diet: Diet renal [facility]; Lactose Restricted  Significant LDAs:   IV Access Type: Peripheral and Dialysis Access  Urinary Catheter Indication if present: Patient Does Not Have Urinary Catheter  Other Lines/Tubes/Drains:    HIGH RISK CONDITION(S):   Patient has a condition that poses threat to life and bodily function: Acute Renal Failure     Goals of Care:    Previous admission:  10/14/22  Likely prognosis:  Fair  Code Status: Full Code  Comfort Only: No  Hospice: No  Goals at discharge: remain at home, with physician follow-up    Discharge Planning   ANTHONY: 12/12/2022     Code Status: Full Code   Is the patient medically ready for discharge?: No    Reason for patient still in hospital (select all that apply): Patient trending condition, Treatment, and Pending disposition  Discharge Plan A: Skilled Nursing Facility   Discharge Delays: None known at this time

## 2022-12-08 NOTE — ASSESSMENT & PLAN NOTE
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  PLEX 10/26, 10/27, 10/29, 10/30, 11/1, 11/2, 11/3 (prior to Rituxan)  Rituximab 375 mg/m^2 (600 mg) on 10/27 11/3, 11/10 and 11/17 (completed 4 doses)  Cyclophosphamide 7.5 mg/kg on 10/27 and 11/10 ( every 14 days ); has completed 2 doses; may need another dose of cyclophosphamide 6 weeks after her last dose (around December 22, 2022)  Serum BUN/Cr slowly trending up; will continue monitoring daily for dialysis needs  Now following PEXIVAS steroid taper; currently on Prednisone 15mg PO daily   Sodium bicarb 650mg PO BID   Continue Atovaquone for prophylaxis   Pt still needs dialysis for clearance about once or twice per week; s/p tunneled dialysis catheter placement   SW for outpatient HD chair; will need intermittent HD scheduled at least once a week   Strict I/Os and chart   Avoid nephrotoxic agents as much as possible  Scheduled for iHD treatment today

## 2022-12-09 LAB
ANION GAP SERPL CALC-SCNC: 14 MMOL/L (ref 8–16)
BACTERIA #/AREA URNS AUTO: ABNORMAL /HPF
BASOPHILS # BLD AUTO: 0.01 K/UL (ref 0–0.2)
BASOPHILS NFR BLD: 0.1 % (ref 0–1.9)
BILIRUB UR QL STRIP: NEGATIVE
BUN SERPL-MCNC: 72 MG/DL (ref 8–23)
CALCIUM SERPL-MCNC: 7.5 MG/DL (ref 8.7–10.5)
CHLORIDE SERPL-SCNC: 102 MMOL/L (ref 95–110)
CLARITY UR REFRACT.AUTO: ABNORMAL
CO2 SERPL-SCNC: 21 MMOL/L (ref 23–29)
COLOR UR AUTO: ABNORMAL
CREAT SERPL-MCNC: 8.8 MG/DL (ref 0.5–1.4)
DIFFERENTIAL METHOD: ABNORMAL
EOSINOPHIL # BLD AUTO: 0 K/UL (ref 0–0.5)
EOSINOPHIL NFR BLD: 0 % (ref 0–8)
ERYTHROCYTE [DISTWIDTH] IN BLOOD BY AUTOMATED COUNT: 15 % (ref 11.5–14.5)
EST. GFR  (NO RACE VARIABLE): 4.3 ML/MIN/1.73 M^2
GLUCOSE SERPL-MCNC: 74 MG/DL (ref 70–110)
GLUCOSE UR QL STRIP: NEGATIVE
HCT VFR BLD AUTO: 24.5 % (ref 37–48.5)
HGB BLD-MCNC: 7.8 G/DL (ref 12–16)
HGB UR QL STRIP: ABNORMAL
HYALINE CASTS UR QL AUTO: 0 /LPF
IMM GRANULOCYTES # BLD AUTO: 0.13 K/UL (ref 0–0.04)
IMM GRANULOCYTES NFR BLD AUTO: 1.5 % (ref 0–0.5)
KETONES UR QL STRIP: NEGATIVE
LEUKOCYTE ESTERASE UR QL STRIP: ABNORMAL
LYMPHOCYTES # BLD AUTO: 2.1 K/UL (ref 1–4.8)
LYMPHOCYTES NFR BLD: 24.3 % (ref 18–48)
MAGNESIUM SERPL-MCNC: 1.8 MG/DL (ref 1.6–2.6)
MCH RBC QN AUTO: 29.1 PG (ref 27–31)
MCHC RBC AUTO-ENTMCNC: 31.8 G/DL (ref 32–36)
MCV RBC AUTO: 91 FL (ref 82–98)
MICROSCOPIC COMMENT: ABNORMAL
MONOCYTES # BLD AUTO: 0.7 K/UL (ref 0.3–1)
MONOCYTES NFR BLD: 8.3 % (ref 4–15)
NEUTROPHILS # BLD AUTO: 5.8 K/UL (ref 1.8–7.7)
NEUTROPHILS NFR BLD: 65.8 % (ref 38–73)
NITRITE UR QL STRIP: NEGATIVE
NRBC BLD-RTO: 0 /100 WBC
PH UR STRIP: 8 [PH] (ref 5–8)
PHOSPHATE SERPL-MCNC: 5.4 MG/DL (ref 2.7–4.5)
PLATELET # BLD AUTO: 128 K/UL (ref 150–450)
PMV BLD AUTO: 11.6 FL (ref 9.2–12.9)
POCT GLUCOSE: 100 MG/DL (ref 70–110)
POCT GLUCOSE: 132 MG/DL (ref 70–110)
POCT GLUCOSE: 248 MG/DL (ref 70–110)
POCT GLUCOSE: 36 MG/DL (ref 70–110)
POCT GLUCOSE: 40 MG/DL (ref 70–110)
POTASSIUM SERPL-SCNC: 4.8 MMOL/L (ref 3.5–5.1)
PROT UR QL STRIP: ABNORMAL
RBC # BLD AUTO: 2.68 M/UL (ref 4–5.4)
RBC #/AREA URNS AUTO: >100 /HPF (ref 0–4)
SODIUM SERPL-SCNC: 137 MMOL/L (ref 136–145)
SP GR UR STRIP: 1.01 (ref 1–1.03)
URN SPEC COLLECT METH UR: ABNORMAL
WBC # BLD AUTO: 8.81 K/UL (ref 3.9–12.7)
WBC #/AREA URNS AUTO: >100 /HPF (ref 0–5)
WBC CLUMPS UR QL AUTO: ABNORMAL

## 2022-12-09 PROCEDURE — 94761 N-INVAS EAR/PLS OXIMETRY MLT: CPT

## 2022-12-09 PROCEDURE — 25000003 PHARM REV CODE 250: Performed by: INTERNAL MEDICINE

## 2022-12-09 PROCEDURE — 20600001 HC STEP DOWN PRIVATE ROOM

## 2022-12-09 PROCEDURE — 85025 COMPLETE CBC W/AUTO DIFF WBC: CPT | Performed by: STUDENT IN AN ORGANIZED HEALTH CARE EDUCATION/TRAINING PROGRAM

## 2022-12-09 PROCEDURE — 97116 GAIT TRAINING THERAPY: CPT

## 2022-12-09 PROCEDURE — 87086 URINE CULTURE/COLONY COUNT: CPT | Performed by: INTERNAL MEDICINE

## 2022-12-09 PROCEDURE — 87088 URINE BACTERIA CULTURE: CPT | Performed by: INTERNAL MEDICINE

## 2022-12-09 PROCEDURE — 83735 ASSAY OF MAGNESIUM: CPT

## 2022-12-09 PROCEDURE — 87186 SC STD MICRODIL/AGAR DIL: CPT | Performed by: INTERNAL MEDICINE

## 2022-12-09 PROCEDURE — 99233 PR SUBSEQUENT HOSPITAL CARE,LEVL III: ICD-10-PCS | Mod: GC,,, | Performed by: PSYCHIATRY & NEUROLOGY

## 2022-12-09 PROCEDURE — 81001 URINALYSIS AUTO W/SCOPE: CPT | Performed by: INTERNAL MEDICINE

## 2022-12-09 PROCEDURE — 87077 CULTURE AEROBIC IDENTIFY: CPT | Performed by: INTERNAL MEDICINE

## 2022-12-09 PROCEDURE — 99233 PR SUBSEQUENT HOSPITAL CARE,LEVL III: ICD-10-PCS | Mod: 95,,, | Performed by: INTERNAL MEDICINE

## 2022-12-09 PROCEDURE — 80048 BASIC METABOLIC PNL TOTAL CA: CPT | Performed by: STUDENT IN AN ORGANIZED HEALTH CARE EDUCATION/TRAINING PROGRAM

## 2022-12-09 PROCEDURE — 63600175 PHARM REV CODE 636 W HCPCS: Performed by: INTERNAL MEDICINE

## 2022-12-09 PROCEDURE — 25000003 PHARM REV CODE 250

## 2022-12-09 PROCEDURE — 25000003 PHARM REV CODE 250: Performed by: HOSPITALIST

## 2022-12-09 PROCEDURE — 99233 SBSQ HOSP IP/OBS HIGH 50: CPT | Mod: GC,,, | Performed by: PSYCHIATRY & NEUROLOGY

## 2022-12-09 PROCEDURE — 84100 ASSAY OF PHOSPHORUS: CPT

## 2022-12-09 PROCEDURE — 36415 COLL VENOUS BLD VENIPUNCTURE: CPT

## 2022-12-09 PROCEDURE — 99233 SBSQ HOSP IP/OBS HIGH 50: CPT | Mod: 95,,, | Performed by: INTERNAL MEDICINE

## 2022-12-09 RX ADMIN — AMLODIPINE BESYLATE 5 MG: 5 TABLET ORAL at 09:12

## 2022-12-09 RX ADMIN — INSULIN ASPART 4 UNITS: 100 INJECTION, SOLUTION INTRAVENOUS; SUBCUTANEOUS at 09:12

## 2022-12-09 RX ADMIN — CARVEDILOL 12.5 MG: 12.5 TABLET, FILM COATED ORAL at 09:12

## 2022-12-09 RX ADMIN — QUETIAPINE FUMARATE 12.5 MG: 25 TABLET ORAL at 08:12

## 2022-12-09 RX ADMIN — PANTOPRAZOLE SODIUM 40 MG: 40 TABLET, DELAYED RELEASE ORAL at 05:12

## 2022-12-09 RX ADMIN — Medication 24 G: at 12:12

## 2022-12-09 RX ADMIN — CARVEDILOL 12.5 MG: 12.5 TABLET, FILM COATED ORAL at 08:12

## 2022-12-09 RX ADMIN — HYDRALAZINE HYDROCHLORIDE 50 MG: 50 TABLET ORAL at 10:12

## 2022-12-09 RX ADMIN — LEVOTHYROXINE SODIUM 25 MCG: 25 TABLET ORAL at 05:12

## 2022-12-09 RX ADMIN — PREDNISONE 15 MG: 5 TABLET ORAL at 12:12

## 2022-12-09 RX ADMIN — HYDRALAZINE HYDROCHLORIDE 50 MG: 50 TABLET ORAL at 02:12

## 2022-12-09 RX ADMIN — Medication 16 G: at 12:12

## 2022-12-09 RX ADMIN — HYDRALAZINE HYDROCHLORIDE 50 MG: 50 TABLET ORAL at 05:12

## 2022-12-09 RX ADMIN — PANTOPRAZOLE SODIUM 40 MG: 40 TABLET, DELAYED RELEASE ORAL at 06:12

## 2022-12-09 RX ADMIN — SENNOSIDES AND DOCUSATE SODIUM 1 TABLET: 50; 8.6 TABLET ORAL at 08:12

## 2022-12-09 RX ADMIN — ATOVAQUONE 1500 MG: 750 SUSPENSION ORAL at 09:12

## 2022-12-09 RX ADMIN — WHITE PETROLATUM: 1.75 OINTMENT TOPICAL at 09:12

## 2022-12-09 NOTE — ASSESSMENT & PLAN NOTE
- Patient was unable to tolerate PO mediations, all meds to be administered via NG tube until removed on 11/15.  - continue to monitor blood pressure trend closely and adjust antihypertensive regimen as clinically indicated and tolerated.  - amLODIPine, carvediloL, hydrALAZINE; doses decreased on 12/4 due to low BP with extreme fatigue and weakness especially on HD days  --continue to monitor and adjust regimen as necessary

## 2022-12-09 NOTE — PLAN OF CARE
Problem: Physical Therapy  Goal: Physical Therapy Goal  Description: Goals remain appropriate and extended to: 2022     Patient will increase functional independence with mobility by performin. Supine to sit with contact guard assistance  2. Sit to supine with contact guard assistance MET   2a. Sit to supine independently  3. Sit to stand transfer with minimum assistance MET   3a. STS supvn LRAD  4. Gait  x 40 feet with minimum assistance using LRAD as needed MET   4a. Gait x 250' LRAD supvn  5. Lower extremity exercise program x10 reps per handout, with independence   Outcome: Ongoing, Progressing

## 2022-12-09 NOTE — ASSESSMENT & PLAN NOTE
- Has bilateral neck gland swelling with tenderness; consulted ENT  - No surgical intervention indicated   - Adenopathy likely reactive in nature   - Recommend further workup if it does not resolve within next 2 weeks   - 11/30-- Adenopathy is still present especially right submandibular region  - ENT follow up as OP

## 2022-12-09 NOTE — ASSESSMENT & PLAN NOTE
- Improving urine output  - Nephrology following- re-evaluating daily for need for Intermittent HD; permacath placement on 12/8

## 2022-12-09 NOTE — ASSESSMENT & PLAN NOTE
- thought to be due to SIADH initially- received NaCl tabs  - NaCl tabs discontinued  - Na stable on HD

## 2022-12-09 NOTE — ASSESSMENT & PLAN NOTE
Multifocal pneumonia  Hemoptysis  Dyspnea  Diffuse Alveolar Hemorrahge  In the setting of a new diagnosis of microscopic polyangiitis, presented with fevers, dyspnea,.  - Chest imaging was consistent with diffuse alveolar hemorrhage.  CT chest w/c 10/17 with JESSICA perihilar dense consolidations w/ peripheral patcy areas of GGO, JESSICA PNA, less c/f cardiogenic pulmonary edema.  - Patient upgraded to Medical ICU 10/23/22 with abrupt respiratory decline requiring NIPPV, improved with high dose IV steroids, plasmapheresis, emergent hemodialysis.   - Unable to perform bronchoscopy in the Medical ICU due to tenuous respiratory status  Hypoxia has resolved  - weaned to room air  - continue management of underlying triggers with steroid and immunosuppressants  - incentive spirometry and respiratory hygiene

## 2022-12-09 NOTE — PROGRESS NOTES
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 Sister and patient at bedside refer that she has been c/o dizziness described as the room spinning that happens mostly with positional changes but she had one yesterday that was pretty severe and lasted a couple of minutes, where she had some head bobbing movements, blank stare with no loss LOC and nausea. Sister refers that this has happened about 3 times and per daughter this is a new issue since this admission. As part of her hospital admission, she had an MRI brain 11/1/22 that showed remote left thalamic and left cerebellar infarct and chronic microvascular changes but no infarction. She denies any focal neurological deficits or symptoms other than the dizziness, no visual changes, tremors, focal weakness, numbness, gate disturbance.     She is currently on a prednisone taper per Rheumatology and plan is to complete 6 doses of cyclophosphamide. 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       Overview/Hospital Course:  No notes on file        Subjective:     Interval History: NAEO; patient much more alert today. Walking and working w PT/OT. She does not much recall much detail from yesterday after procedure. She denies any dizzy spells today. No  changes on neuro exam.   MRI brain with no acute/subacute infarcts. No evidence to suggest CNS vasculitis.     Current Neurological Medications: as detailed below     Current Facility-Administered Medications   Medication Dose Route Frequency Provider Last Rate Last Admin    acetaminophen tablet 650 mg  650 mg Oral Q4H PRN Magui Cage MD   650 mg at 12/04/22 1636    albuterol-ipratropium 2.5 mg-0.5 mg/3 mL nebulizer solution 3 mL  3 mL Nebulization Q4H PRN Kandace Smith PA-C   3 mL at 11/02/22 0746    aluminum-magnesium hydroxide-simethicone 200-200-20 mg/5 mL suspension 30 mL  30 mL Oral QID PRN Kandace Smith PA-C   30 mL at 12/05/22 1002    amLODIPine tablet 5 mg  5 mg Oral Daily Haleigh Parks MD   5 mg at 12/09/22 0900    atovaquone 750 mg/5 mL oral liquid 1,500 mg  1,500 mg Oral Daily Haleigh Parks MD   1,500 mg at 12/09/22 0949    bisacodyL suppository 10 mg  10 mg Rectal Daily PRN Kandace Smith PA-C   10 mg at 11/21/22 1212    carvediloL tablet 12.5 mg  12.5 mg Oral BID Haleigh Parks MD   12.5 mg at 12/09/22 0948    cloNIDine tablet 0.1 mg  0.1 mg Oral Q8H PRN Haleigh Parks MD        dextrose 10% bolus 125 mL  12.5 g Intravenous PRN Immanuel Peres MD   Stopped at 11/08/22 2336    dextrose 10% bolus 250 mL  25 g Intravenous PRN Immanuel Peres MD        epoetin hira injection 2,880 Units  50 Units/kg Subcutaneous Every Mon, Wed, Fri James Gomez MD   2,880 Units at 12/07/22 1227    glucagon (human recombinant) injection 1 mg  1 mg Intramuscular PRN Magui Cage MD        glucose chewable tablet 16 g  16 g Oral PRN Kandace Smith PA-C        glucose chewable tablet 24 g  24 g Oral PRN Kandace Smith PA-C        heparin (porcine) injection 1,000 Units  1,000 Units Intra-Catheter PRARTHUR Puente MD   1,000 Units at 12/03/22 1514    heparin (porcine) injection 1,000 Units  1,000 Units Intra-Catheter PRARTHUR Gomez MD   1,000 Units at 11/22/22 9122     heparin (porcine) injection 1,000 Units  1,000 Units Intra-Catheter PRN James Gomez MD   1,000 Units at 12/03/22 1520    heparin, porcine (PF) 100 unit/mL injection flush 500 Units  500 Units Intravenous PRN Woo Palacio MD        heparin, porcine (PF) 100 unit/mL injection flush 500 Units  500 Units Intravenous PRN Woo Palacio MD        hydrALAZINE tablet 50 mg  50 mg Oral Q8H Haleigh Parks MD   50 mg at 12/09/22 0530    insulin aspart U-100 pen 1-10 Units  1-10 Units Subcutaneous PRN Haleigh Parks MD   4 Units at 12/09/22 0949    levothyroxine tablet 25 mcg  25 mcg Oral Before breakfast Haleigh Parks MD   25 mcg at 12/09/22 0530    meclizine tablet 25 mg  25 mg Oral TID PRN Haleigh Parks MD   25 mg at 12/07/22 1536    melatonin tablet 6 mg  6 mg Oral Nightly PRN Kandace Smith PA-C   6 mg at 12/04/22 2042    methocarbamoL tablet 500 mg  500 mg Oral TID PRN Kandace Smith PA-C   500 mg at 10/30/22 0058    metoclopramide HCl injection 5 mg  5 mg Intravenous Q6H PRN Ej Fregoso MD        naloxone 0.4 mg/mL injection 0.02 mg  0.02 mg Intravenous PRN Kandace Smith PA-C        ondansetron injection 4 mg  4 mg Intravenous Q8H PRN Bev Daly MD   4 mg at 12/05/22 1002    pantoprazole EC tablet 40 mg  40 mg Oral BID AC [place] Quinn MD   40 mg at 12/09/22 0530    predniSONE tablet 15 mg  15 mg Oral with lunch Haleigh Parks MD   15 mg at 12/08/22 1113    Followed by    [START ON 12/18/2022] predniSONE tablet 12.5 mg  12.5 mg Oral with lunch Haleigh Parks MD        Followed by    [START ON 1/1/2023] predniSONE tablet 10 mg  10 mg Oral with lunch Haleigh Parks MD        [START ON 1/15/2023] predniSONE tablet 5 mg  5 mg Oral Daily Haleigh Parks MD        prochlorperazine injection Soln 5 mg  5 mg Intravenous Q6H PRN Kandace Smith PA-C   5 mg at 10/24/22 0001    quetiapine split tablet 12.5 mg  12.5 mg Oral QHS Haleigh Parks MD   12.5 mg at 12/08/22 2053     senna-docusate 8.6-50 mg per tablet 1 tablet  1 tablet Oral BID Yaquelin Concepcion MD   1 tablet at 12/08/22 2052    simethicone chewable tablet 80 mg  1 tablet Oral QID PRN Kandace Smith PA-C   80 mg at 11/21/22 1311    sodium chloride 0.9% 250 mL flush bag   Intravenous 1 time in Clinic/HOD Woo Palacio MD   Held at 10/27/22 1700    sodium chloride 0.9% bolus 250 mL  250 mL Intravenous PRN James Gomez MD        sodium chloride 0.9% bolus 250 mL  250 mL Intravenous PRN James Gomez MD        sodium chloride 0.9% flush 10 mL  10 mL Intravenous PRN Woo Palacio MD        sodium chloride 0.9% flush 10 mL  10 mL Intravenous PRN Woo Palacio MD        sodium chloride 0.9% flush 10 mL  10 mL Intravenous PRN Woo Palacio MD        sodium chloride 0.9% flush 10 mL  10 mL Intravenous PRN Woo Palacio MD        sucralfate tablet 1 g  1 g Oral TID PRN Ej Fregoso MD   1 g at 11/30/22 1343    white petrolatum 41 % ointment   Topical (Top) Daily Haleigh Parks MD   Given at 12/09/22 0900     Facility-Administered Medications Ordered in Other Encounters   Medication Dose Route Frequency Provider Last Rate Last Admin    celecoxib capsule 400 mg  400 mg Oral Once Dieudonne Marshall MD        fentaNYL 50 mcg/mL injection  mcg   mcg Intravenous PRN Dieudonne Marshall MD   100 mcg at 12/21/21 0919    LIDOcaine (PF) 10 mg/ml (1%) injection 10 mg  1 mL Intradermal Once PRN Dieudonne Marshall MD        LIDOcaine (PF) 10 mg/ml (1%) injection 10 mg  1 mL Intradermal Once Dieudonne Marshall MD        midazolam (VERSED) 1 mg/mL injection 0.5-4 mg  0.5-4 mg Intravenous PRN Dieudonne Marshall MD   2 mg at 12/21/21 0919    ropivacaine 0.2% [facility] PainPRO Pump infusion 500 ML   Perineural Continuous Dieudonne Marshall MD   New Bag at 12/21/21 1153       Review of Systems   Constitutional:  Negative for appetite change, chills and fever.   HENT:   Negative for congestion, sore throat, trouble swallowing and voice change.    Eyes: Negative.    Respiratory:  Negative for cough and shortness of breath.    Cardiovascular:  Negative for chest pain and leg swelling.   Gastrointestinal:  Negative for abdominal distention, abdominal pain, constipation, nausea and vomiting.   Endocrine: Negative.    Genitourinary: Negative.    Musculoskeletal:  Negative for back pain and gait problem.   Skin: Negative.    Neurological:  Positive for dizziness. Negative for weakness and headaches.   Psychiatric/Behavioral: Negative.     Objective:     Vital Signs (Most Recent):  Temp: 98.6 °F (37 °C) (12/09/22 0919)  Pulse: 78 (12/09/22 0919)  Resp: 17 (12/09/22 0919)  BP: (!) 142/66 (12/09/22 0919)  SpO2: 96 % (12/09/22 0919)   Vital Signs (24h Range):  Temp:  [97.9 °F (36.6 °C)-98.6 °F (37 °C)] 98.6 °F (37 °C)  Pulse:  [65-78] 78  Resp:  [17-18] 17  SpO2:  [95 %-96 %] 96 %  BP: (135-143)/(61-66) 142/66     Weight: 66.3 kg (146 lb 2.6 oz)  Body mass index is 27.62 kg/m².    Physical Exam  Constitutional:       Appearance: Normal appearance.   HENT:      Head: Normocephalic and atraumatic.      Nose: Nose normal.      Mouth/Throat:      Mouth: Mucous membranes are moist.   Eyes:      Extraocular Movements: Extraocular movements intact.      Pupils: Pupils are equal, round, and reactive to light.   Pulmonary:      Effort: No respiratory distress.   Abdominal:      General: Bowel sounds are normal. There is no distension.      Palpations: Abdomen is soft.      Tenderness: There is no abdominal tenderness.   Musculoskeletal:         General: No swelling. Normal range of motion.      Cervical back: Normal range of motion.   Skin:     General: Skin is warm.      Capillary Refill: Capillary refill takes less than 2 seconds.   Neurological:      General: No focal deficit present.      Mental Status: She is alert and oriented to person, place, and time. Mental status is at baseline.       Cranial Nerves: Cranial nerves 2-12 are intact.      Motor: Motor strength is normal.      Coordination: Finger-Nose-Finger Test normal.      Gait: Gait is intact.      Deep Tendon Reflexes:      Reflex Scores:       Bicep reflexes are 2+ on the right side and 2+ on the left side.       Brachioradialis reflexes are 2+ on the right side and 2+ on the left side.       Patellar reflexes are 2+ on the right side and 2+ on the left side.  Psychiatric:         Speech: Speech normal.       NEUROLOGICAL EXAMINATION:     MENTAL STATUS   Oriented to person, place, and time.   Attention: normal. Concentration: normal.   Speech: speech is normal   Level of consciousness: alert    CRANIAL NERVES   Cranial nerves II through XII intact.     CN III, IV, VI   Pupils are equal, round, and reactive to light.    MOTOR EXAM   Muscle bulk: normal  Right arm pronator drift: absent  Left arm pronator drift: absent    Strength   Strength 5/5 throughout.     REFLEXES     Reflexes   Right brachioradialis: 2+  Left brachioradialis: 2+  Right biceps: 2+  Left biceps: 2+  Right patellar: 2+  Left patellar: 2+  Right plantar: normal  Left plantar: normal  Right Lugo: absent  Left Lugo: absent    SENSORY EXAM   Light touch normal.     GAIT AND COORDINATION     Gait  Gait: normal     Coordination   Finger to nose coordination: normal    Tremor   Resting tremor: absent  Intention tremor: absent    Significant Labs: All pertinent lab results from the past 24 hours have been reviewed.    Significant Imaging: I have reviewed and interpreted all pertinent imaging results/findings within the past 24 hours.      MRI brain w/o contrast   FINDINGS:  Remote small infarct in left medial thalamus similar to the prior CT.  Periventricular and subcortical white matter changes are present likely related to chronic microvascular disease.  Small remote left medial occipital cortical infarct.  There is no evidence of recent or remote hemorrhage.  No  extra-axial collections are identified.  No hydrocephalus.  Skull base structures are unremarkable.       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 She has had a couple of episodes but most severe was 12/7/22. Mostly they have been positional and she does have some history of vertigo. On exam patient initially had some Left sided weakness and dysmetria L>R w FTN but this was after her tunneled cath procedure where she was not at her baseline. There was question for possible cortical/vascular pathology.  Repeat MRI brain with no acute infarct or changes to suggest CNS vasculitis. Compared with prior MRI 11/1 and stable. Today patient does not have any focal deficits.   She denies any dizzy spells while working w PT.   Overall picture not consistent with CNS vasculitis and dizzy spells as positional seem to be pre syncope vs BPPV.    Recommendations  -- Orthostatic BP   -- Continue with fall precautions  -- PT/OT for vestibular therapy   --  Will benefit from OP workup with cardiology and possible tilt table test   -- OP neurology for BPPV     Thank you for the consult. Neurology will sign off at this time. Please call or consult for any questions or concerns.              VTE Risk Mitigation (From admission, onward)           Ordered     heparin (porcine) injection 1,000 Units  As needed (PRN)         11/29/22 0857     heparin (porcine) injection 1,000 Units  As needed (PRN)         11/22/22 0832     heparin, porcine (PF) 100 unit/mL injection flush 500 Units  As needed (PRN)         11/17/22 1343     heparin, porcine (PF) 100 unit/mL injection flush 500  Units  As needed (PRN)         11/10/22 1430     heparin (porcine) injection 1,000 Units  As needed (PRN)         11/09/22 1601     heparin (porcine) injection 1,000 Units  As needed (PRN)         11/08/22 0854     Place sequential compression device  Until discontinued         10/25/22 1731     IP VTE HIGH RISK PATIENT  Once         10/17/22 1354     Reason for No Pharmacological VTE Prophylaxis  Once        Question:  Reasons:  Answer:  Risk of Bleeding    10/17/22 1354                    Elizabeth Villalobos MD  Neurology  [facility] - Intensive Care ([place]-[facility])

## 2022-12-09 NOTE — ASSESSMENT & PLAN NOTE
In the setting of GI bleed with melena  - Evaluated by GI, see GI bleed documentation  - Last transfusion 10/28, Hgb slowly trending down since then  - Hgb 6.9 on 11/5  - Monitor CBC   - Treat with pRBC transfusion PRN Hgb <7  - Transfused 3u pRBC

## 2022-12-09 NOTE — ASSESSMENT & PLAN NOTE
Starting having hemoptysis and melena with associated acute blood loss anemia earlier in hospital stay. Patient with known internal hemorrhoids, mild sigmoid diverticulosis, history of gastritis and + H pylori (2012 EGD).   - GI consulted 10/19, unable to perform EGD due to instability and respiratory failure at the time  - PPI PO BID   - Monitor CBC closely for signs of recurrent bleed  - EGD w/no acute bleed seen  - Transfused 2units pRBC

## 2022-12-09 NOTE — PROGRESS NOTES
[facility] - Intensive Care ([place] [address])  [facility] Medicine  Telemedicine Progress Note    Patient Name: Kristin Goodman  MRN: [id number]  Patient Class: IP- Inpatient   Admission Date: 10/17/2022  Length of Stay: 52 days  Attending Physician: Yaquelin Concepcion MD  Primary Care Provider: Lori Hernandez MD    Subjective:     Principal Problem:Microscopic polyangiitis    HPI:  Kristin Goodman is a 77 y.o. female with a past medical history of HTN, hypothyroidism, HFpEF, and osteoarthritis of the arm who has presented to the ED for cough, SOB, and weakness. Daughter is present at the bedside. Patient presented to the ED on 9/24 with hemoptysis, CT and CXR showed high suspicion for multilobar pneumonia. Patient was discharged with a 10-day course of levofloxacin and an albuterol inhaler. Patient followed up with her PCP on 10/4 with slight improvement in symptoms and went back to work. Patient continued to have worsening symptoms and presented to the ED again on 10/14 for evaluation and no interventions were done. Patient endorses fevers over the last few days up to 102.4 F with progressive SOB. She endorses hemoptysis with moderate amount of blood, generalized weakness, productive cough, SOB, and loss of appetite. Denies chest pain, nausea, vomiting, abdominal pain, or urinary changes.    ED: hypertensive up to 219/93 and tachycardic up to 117. Oxygen saturation on 92% on RA, placed on 5L NC with sats >97%. CBC remarkable for leukocytosis of 16.03 and Hb 7.7. K 3.3. Cr 1.6, baseline ~1.0. . Troponin 0.061. COVID and flu negative. EKG NSR. CT chest with contrast pending at time of admission. Given home amlodipine and lisinopril. Given 500mL NS, IV azithromycin, cefepime and vanc.       Overview/Hospital Course:  HM Course:  Ms. Goodman was admitted to Hospital Medicine for management of multifocal pneumonia and acute hypoxemic respiratory failure after presenting to the ED with fevers, cough, hemoptysis, and  dyspnea. She required escalation to the MICU due to abrupt decline in her respiratory status, associated with hemoptysis and requiring NIPPV. CT chest showed bilateral patchy ground glass opacities. Labs additionally were notable for acute renal failure on CKD3. Pulmonology was consulted while under the care of [facility] and felt this picture was consistent with diffuse alveolar hemorrhage.     ICU Course:   Her workup revealed a strongly positive MPO Ab consistent with clinical picture of microscopic polyangiitis with pulmonary and renal involvement. She underwent Trialysis catheter placement 10/25/2022 in the Medical ICU. 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     Rheumatology was consulted, extensive workup revealed MITUL + 1:2560 homogenous, +dsDNA, normal complements, PR3 1.2 (slight +),  (+), cANCA + 1:80, pANCA neg, GBMAb neg, trace cryos. Due to concern for MPA, she was started on pulse dose IV methylprednisolone 1000mg for 3 days, and plasmapheresis under the guidance of Transfusion Medicine. Patient was placed on steroid taper and completed PLEX. She additionally received rituximab and cyclophosphamide. OI prophylaxis was provided with atovaquone due to a sulfa allergy.     Nephrology was consulted for evaluation of acute renal failure in the setting of MPA. She did require SLED in the ICU for renal clearance and remains on intermittent hemodialysis. She is expected to continue HD once discharged.     Her acute hypoxemic respiratory failure improved and she was weaned off of supplemental oxygen. She stepped down to [facility] 10/28.     HM Course:  She underwent MBBS for evaluation of dysphagia, which showed global delayed initiation of swallow  and aspiration with thin liquids. Due to concern for possible prior stroke, head imaging was completed and negative for acute finding. She was NPO with NG tube. ENT was consulted for evaluation of dysphagia and cervical adenopathy.  Patient did not tolerate laryngoscopy; unable to visualize vocal cords but given her lack of hoarseness, they did not suspect vocal fold paresis/paralysis. MRI recommended by rheum to fully rule out any potential neurological cause of the patient's dysphagia; MRI demonstrated remote left thalamic lacunar type infarct and punctate remote left cerebellar infarcts.  She continued to work with speech therapy.  EGD completed 11/14 without abnormalities.  Per GI, Suspect some aspect of esophageal dysmotility in setting of critical illness. No further testing at this time.  Per SLP, diet advanced on 11/15 and NG tube removed.    Her other chronic medical conditions including hypothyroidism, diastolic CHF, and hypertension were managed with her home medications, with dose adjustments as needed.     Patient was noted to have downtrending Hg 11/17- required 1u pRBC. Rheum continued to follow for further steroid dosing. Patient was started on 2g NaCl tab TID for SIADH. SLP recommended mechanical soft diet with thin liquids 11/17. Na normalized 11/18. Hg improved to 8.5 following 1u pRBC. Patient had some hyperkalemia- started on scheduled lokelma. Patient continued to have improved UOP; however, BUN: Cr ratio uptrended. Nephro decided to hang off on HD as patient was having 500-600mL/24hr. He was started on scheduled NaHCO3 as ewll. Patient underwent HD 11/23. Patient had drop in Hg once again- 6.5 on 11/25. Given 1u pRBC (2nd unit). Underwent HD 11/30. Nephrology following to re-evaluate daily for need for HD and permacath placement.       Telemedicine  This service was provided by [facility].    Patient was transferred to [facility] on:  12/03/2022     Chief Complaint   Patient  presents with    Multiple complaints     Seen 2 other times since sept, cont with now fever, cough with blood      The patient location is: [id number] A   Admitted 10/17/2022 10:12 AM    Interval History / Events Overnight:   The patient is able to provide adequate history. Additional history was obtained from past medical records. No significant events reported by Nursing.  Patient complains of dysuria. Symptoms have worsened since yesterday. Associated symptoms include: fatigue. Symptoms are increasing in severity.     Lab test(s) reviewed: H&H stable    Review of Systems   Constitutional:  Negative for fever.   Respiratory:  Negative for shortness of breath.      Objective:     Vital Signs (Most Recent):  Temp: 97.9 °F (36.6 °C) (12/08/22 1148)  Pulse: 65 (12/08/22 1148)  Resp: 11 (12/08/22 0915)  BP: (!) 142/61 (12/08/22 1148)  SpO2: 96 % (12/08/22 1148)   Vital Signs (24h Range):  Temp:  [96.4 °F (35.8 °C)-98.1 °F (36.7 °C)] 97.9 °F (36.6 °C)  Pulse:  [60-84] 65  Resp:  [11-19] 11  SpO2:  [94 %-100 %] 96 %  BP: (120-158)/(58-72) 142/61     Weight: 66.3 kg (146 lb 2.6 oz)  Body mass index is 27.62 kg/m².  No intake or output data in the 24 hours ending 12/08/22 1349   Physical Exam  Constitutional:       General: She is not in acute distress.     Appearance: Normal appearance.   Eyes:      General: Lids are normal. No scleral icterus.        Right eye: No discharge.         Left eye: No discharge.      Conjunctiva/sclera: Conjunctivae normal.   Neck:      Trachea: Phonation normal.   Cardiovascular:      Rate and Rhythm: Normal rate.      Comments: Monitor / Vital signs reviewed at time of visit  Pulmonary:      Effort: Pulmonary effort is normal. No tachypnea, accessory muscle usage or respiratory distress.   Abdominal:      General: There is no distension.   Skin:     Coloration: Skin is not cyanotic.   Neurological:      Mental Status: She is alert. She is not disoriented.   Psychiatric:         Attention  and Perception: Attention normal.         Mood and Affect: Affect normal.         Behavior: Behavior is cooperative.       Significant Labs:  Recent Labs   Lab 08/02/22  1156   HGBA1C 5.5     Recent Labs   Lab 12/08/22  1148 12/08/22  1606 12/08/22  1913   POCTGLUCOSE 106 126* 140*     Recent Labs   Lab 12/06/22  0204 12/07/22  0440 12/08/22 0448   WBC 8.87 10.93 9.43   HGB 7.9* 8.8* 8.4*   HCT 25.6* 28.4* 26.4*   * 126* 141*     Recent Labs   Lab 12/06/22  0204 12/07/22  0440 12/08/22 0448   GRAN 69.4  6.2 74.1*  8.1* 68.6  6.5   LYMPH 22.8  2.0 17.9*  2.0 21.3  2.0   MONO 6.1  0.5 6.3  0.7 8.2  0.8   EOS 0.0 0.0 0.0     Recent Labs   Lab 12/06/22  0204 12/07/22  0440 12/08/22  0448 12/08/22  0449    138 138  --    K 4.6 4.6 4.4  --     103 103  --    CO2 23 21* 23  --    BUN 48* 61* 69*  --    CREATININE 7.2* 8.4* 8.9*  --    GLU 74 90 82  --    CALCIUM 7.7* 7.9* 7.7*  --    MG 2.0 1.9  --  1.8   PHOS 4.9* 4.9*  --  4.9*     Recent Labs   Lab 12/07/22  0440   INR 1.1     Recent Labs   Lab 09/24/22  1120 10/14/22  1638 10/17/22  1200 10/23/22  1822 10/30/22  0426 11/25/22  0255   PROCAL 0.06 0.12  --   --   --   --    LACTATE 0.7  --  0.9 0.9  --   --    DDIMER  --  1.96*  --   --   --   --    FERRITIN  --   --   --   --  374* 588*     SARS-CoV2 (COVID-19) Qualitative PCR (no units)   Date Value   10/24/2022 Not Detected   02/14/2022 Not Detected   09/25/2020 Not Detected   05/21/2020 Not Detected     SARS-CoV-2 RNA, Amplification, Qual (no units)   Date Value   10/17/2022 Negative     POC Rapid COVID (no units)   Date Value   09/24/2022 Negative       ECG Results              EKG 12-lead (Final result)  Result time 10/17/22 14:26:15      Final result by Interface, Lab In [facility] (10/17/22 14:26:15)                   Narrative:    Test Reason : R06.02,    Vent. Rate : 093 BPM     Atrial Rate : 093 BPM     P-R Int : 126 ms          QRS Dur : 070 ms      QT Int : 356 ms       P-R-T Axes  : 048 052 030 degrees     QTc Int : 442 ms    Normal sinus rhythm  Normal ECG  When compared with ECG of 14-OCT-2022 14:26,  Limb lead reversal has been corrected  Confirmed by Jc Barbosa MD ([id number]) on 10/17/2022 2:26:04 PM    Referred By: MANDYERR   SELF           Confirmed By:Jc Barbosa MD                                    Results for orders placed during the hospital encounter of 10/17/22    Echo    Interpretation Summary  · The left ventricle is normal in size with normal systolic function. The estimated ejection fraction is 65%.  · Normal right ventricular size with normal right ventricular systolic function.  · Grade I left ventricular diastolic dysfunction.  · Mild tricuspid regurgitation.  · There is pulmonary hypertension. The estimated PA systolic pressure is 56 mmHg.  · Normal to low central venous pressure (3 mmHg).      IR Tunneled Catheter Insert w/o Port  Narrative: EXAMINATION:  Tunneled central venous catheter placement    Procedural Personnel    Attending physician(s): Ryan    Fellow physician(s): None    Resident physician(s): None    Advanced practice provider(s): None    Pre-procedure diagnosis: ESRD    Post-procedure diagnosis: Same    Indication: Performance of hemodialysis    Additional clinical history: None    Complications: No immediate complications.    TECHNIQUE:  - Venous access with ultrasound guidance    - Tunneled central venous catheter insertion with fluoroscopic guidance    FINDINGS:  Pre-procedure    History and imaging of central venous access reviewed (QCDR): Yes    Consent: Informed consent for the procedure was obtained and time-out was performed prior to the procedure.    Prophylactic antibiotic administered: Within 1 hour of procedure start time or 2 hours for vancomycin or fluoroquinolones    Preparation (MIPS): The site was prepared and draped using all elements of maximal sterile barrier technique including sterile gloves, sterile gown, cap, mask, large  sterile sheet, sterile ultrasound probe cover, hand hygiene and cutaneous antisepsis with 2% chlorhexidine.    Medical reason for site preparation exception (MIPS): Not applicable    Anesthesia/sedation    Level of anesthesia/sedation: Moderate sedation (conscious sedation)    Anesthesia/sedation administered by: Independent trained observer under attending supervision with continuous monitoring of the patient's level of consciousness and physiologic status    Total intra-service sedation time (minutes): 15    Access    Local anesthesia was administered. The vessel was sonographically evaluated and determined to be patent. Real time ultrasound was used to visualize needle entry into the vessel and a permanent image was stored.    Vein accessed: Internal jugular vein    Access technique: Micropuncture set with 21 gauge needle    Catheter placement    An incision was made near the venous access site and the catheter was tunneled subcutaneously to the venous access site . The catheter was advanced via a peel-away sheath into the vein under fluoroscopic guidance. Catheter tip location was fluoroscopically verified and a permanent image was stored.    Catheter placed: Proceeding long-term HD catheter    Catheter size (Tajik): 15    Catheter cuff-to-tip or trimmed length (cm): 19    Catheter tip position: 2.5 VBUs below remy.    Unique Device Identifier (BREANNA): Not available    Catheter flush: Heparin (1000 units/mL)    Closure    A sterile dressing was applied.    Access site closure technique: Tissue adhesive    Catheter securement technique: Non-absorbable suture    Additional Details    Additional description of procedure: None    Equipment details: None    Specimens removed: None    Estimated blood loss (mL): Less than 10    Standardized report: [email or web address]_[email or web address]    Attestation    Signer name: Ryan    I attest that I was present for the entire procedure. I reviewed the stored images and agree with the  report as written.  Impression: Insertion of right -sided supradiaphragmatic dual- lumen tunneled dialysis catheter, with tip in the expected location of the cavoatrial junction.    Plan:    The catheter may be used immediately.    _______________________________________________________________    Electronically signed by: Musa Davis MD  Date:    12/08/2022  Time:    09:53      Labs and Imaging within the last 24 hours listed above were reviewed.       Diet: Diet renal [facility]; Lactose Restricted  Significant LDAs:   IV Access Type: Peripheral and Dialysis Access  Urinary Catheter Indication if present: Patient Does Not Have Urinary Catheter  Other Lines/Tubes/Drains:    HIGH RISK CONDITION(S):   Patient has a condition that poses threat to life and bodily function: Acute Renal Failure     Goals of Care:    Previous admission:  10/14/22  Likely prognosis:  Fair  Code Status: Full Code  Comfort Only: No  Hospice: No  Goals at discharge: remain at home, with physician follow-up    Discharge Planning   ANTHONY: 12/12/2022     Code Status: Full Code   Is the patient medically ready for discharge?: No    Reason for patient still in hospital (select all that apply): Patient trending condition, Treatment, and Pending disposition  Discharge Plan A: Skilled Nursing Facility   Discharge Delays: None known at this time      Assessment/Plan:      * Microscopic polyangiitis  As of 10/26/22 strongly positive MPO Ab (in contrast to borderline positive PR3), consistent with clinical picture of microscopic polyangiitis with pulmonary, and renal involvement after presenting with acute hypoxemic respiratory failure, hemoptysis, and acute renal failure.  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   2)  NECROTIZING CRESCENTIC GLOMERULONEPHRITIS, CONSISTENT WITH A CONCURRENT   PAUCI-IMMUNE  NECROTIZING CRESCENTIC GLOMERULONEPHRITIS.   3)  CHANGES SUGGESTIVE OF FOCAL ACUTE PYELONEPHRITIS.   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 Currently on oral steroid taper   - plan for 20mg IV daily on 11/6 for 14 days (last dose of 20mg equivalent 11/20/2022); completing oral Prednisone per PEXIVAS steroid taper  - apheresis: underwent PLEX 10/26, 10/27, 10/30, 11/1, 11/2, 11/3  - rituximab every 7 days for 4 doses: Dose #1: 10/27, #2 11/3, #3 11/10, and #4 given 11/17  - cyclophosphamide every 14 days for 2 doses: 10/27, 11/10  - Opportunistic Infection ppx: atovaquone 1500mg PO daily  - GI bleed prophylaxis: pantoprazole 40mg daily     Dizzy spells  Occurring intermittently during hospitalization but also when patient was younger  -associated with nausea, weakness; shifting eye movements present  -meclizine prn  -Neurology evaluation due to recent vasculitis diagnosis; MRI brain ordered 12/8    Anemia due to chronic kidney disease  - due to CKD  - Hg stable  - Monitor trend     Primary pauci-immune necrotizing and crescentic glomerulonephritis  Patient with acute kidney injury likely due to microscopic polyangiitis.  WILFREDO is currently stable. Labs reviewed- Renal function/electrolytes with Estimated Creatinine Clearance: 4.8 mL/min (A) (based on SCr of 8.9 mg/dL (H)). according to latest data. Monitor urine output and serial BMP and adjust therapy as needed. Avoid nephrotoxins and renally dose meds for GFR listed above.   Nephrology following and patient receives HD as indicated.     Gastric reflux  - PPI BID    High  risk medications (not anticoagulants) long-term use  See history of vasculitis therapy    Anuria  - Improving urine output  - Nephrology following- re-evaluating daily for need for Intermittent HD; permacath placement on 12/8    Gastrointestinal hemorrhage with melena  Starting having hemoptysis and melena with associated acute blood loss anemia earlier in hospital stay. Patient with known internal hemorrhoids, mild sigmoid diverticulosis, history of gastritis and + H pylori (2012 EGD).   - GI consulted 10/19, unable to perform EGD due to instability and respiratory failure at the time  - PPI PO BID   - Monitor CBC closely for signs of recurrent bleed  - EGD w/no acute bleed seen  - Transfused 2units pRBC    Dysphagia  SLP following  MBSS showing global weakness in swallowing as well as aspiration with thin liquids.   ENT consulted but patient did not tolerate laryngoscopy   - Dysphagia possibly due to previous stroke  - Briefly required NGT, worked with SLP  - NGT removed- being treated with nystatin swish and swallow for thrush  - GI consulted as well for concern of oropharyngeal candidiasis;  Low suspicion for esophageal candidiasis without odynophagia however can have if white plaques have been seen on oropharynx, ok to start fluconazole empirically for possible esophageal candidiasis  - EGD on 11/14 without abnormality; per GI, Suspect some aspect of esophageal dysmotility in setting of critical illness. No further testing at this time.  - Advanced to renal diet 11/22    Moderate malnutrition  Nutrition consulted. Most recent weight and BMI monitored-     Malnutrition (Moderate to Severe)  Weight Loss (Malnutrition): 7.5% in 3 months    Measurements:  Wt Readings from Last 1 Encounters:   12/07/22 66.3 kg (146 lb 2.6 oz)   Body mass index is 27.62 kg/m².    Recommendations: Recommendation/Intervention: 1.  Goals: Meet % EEN, EPN by RD f/u date    Patient has been screened and assessed by RD. RD will  follow patient.      Acute renal failure on dialysis  Due to microscopic polyangiitis. Remains on hemodialysis intermittently.   - Baseline creatinine 1.0-1.2.   - New onset proteinuria/hematuria.   - Renal biopsy completed   - tunneled HD catheter in place 12/8 for intermittent Hemodialysis  - Nephrology following  - renally dose all medications  - intermittent Hemodialysis as indicated, per Nephrology: pursue outpatient HD; IR consulted for tunneled catheter placement; case management to assist with HD chair.    Acute hypoxemic respiratory failure  Multifocal pneumonia  Hemoptysis  Dyspnea  Diffuse Alveolar Hemorrahge  In the setting of a new diagnosis of microscopic polyangiitis, presented with fevers, dyspnea,.  - Chest imaging was consistent with diffuse alveolar hemorrhage.  CT chest w/c 10/17 with JESSICA perihilar dense consolidations w/ peripheral patcy areas of GGO, JESSICA PNA, less c/f cardiogenic pulmonary edema.  - Patient upgraded to Medical ICU 10/23/22 with abrupt respiratory decline requiring NIPPV, improved with high dose IV steroids, plasmapheresis, emergent hemodialysis.   - Unable to perform bronchoscopy in the Medical ICU due to tenuous respiratory status  Hypoxia has resolved  - weaned to room air  - continue management of underlying triggers with steroid and immunosuppressants  - incentive spirometry and respiratory hygiene    Lymphadenopathy of head and neck  - Has bilateral neck gland swelling with tenderness; consulted ENT  - No surgical intervention indicated   - Adenopathy likely reactive in nature   - Recommend further workup if it does not resolve within next 2 weeks   - 11/30-- Adenopathy is still present especially right submandibular region  - ENT follow up as OP    Hyponatremia  - thought to be due to SIADH initially- received NaCl tabs  - NaCl tabs discontinued  - Na stable on HD    Other specified anemias  In the setting of GI bleed with melena  - Evaluated by GI, see GI bleed  documentation  - Last transfusion 10/28, Hgb slowly trending down since then  - Hgb 6.9 on 11/5  - Monitor CBC   - Treat with pRBC transfusion PRN Hgb <7  - Transfused 3u pRBC    Chronic diastolic heart failure  Pulmonary 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  - continue to monitor blood pressure trend closely and adjust antihypertensive regimen as clinically indicated and tolerated.  - amLODIPine, carvediloL, hydrALAZINE; doses decreased on 12/4 due to low BP with extreme fatigue and weakness especially on HD days  --continue to monitor and adjust regimen as necessary    Hypothyroid  - Continue Synthroid 25 mcg  - TSH 0.585 10/27/22        Active Hospital Problems    Diagnosis  POA    *Microscopic polyangiitis [M31.7]  Yes    Dizzy spells [R42]  Yes    Anemia due to chronic kidney disease [N18.9, D63.1]  Yes    Anuria [R34]  Yes    High risk medications (not anticoagulants) long-term use [Z79.899]  Not Applicable    Gastric reflux [K21.9]  Yes    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, N05.7]  Yes    Gastrointestinal hemorrhage with melena [K92.1]  No    Dysphagia [R13.10]  Yes    Moderate malnutrition [E44.0]  Yes    Acute renal failure on dialysis [N17.9, Z99.2]  Not Applicable    Acute hypoxemic respiratory failure [J96.01]  Yes    Lymphadenopathy of head and neck [R59.1]  Yes    Hyponatremia [E87.1]  Yes    Other specified anemias [D64.89]  Yes    Chronic diastolic heart failure [I50.32]  Yes    Hyperkalemia [E87.5]  Yes    Primary hypertension [I10]   Yes    Hypothyroid [E03.9]  Yes      Resolved Hospital Problems    Diagnosis Date Resolved POA    Hemoptysis [R04.2] 11/05/2022 Yes    Diffuse pulmonary alveolar hemorrhage [R04.89] 11/05/2022 Yes    Hypernatremia [E87.0] 11/05/2022 No    Dysuria [R30.0] 11/05/2022 No    Septic shock [A41.9, R65.21] 11/05/2022 Yes    Cervical adenopathy [R59.0] 11/05/2022 No    Acute renal failure superimposed on stage 3 chronic kidney disease [N17.9, N18.30] 11/05/2022 Yes    Sepsis due to pneumonia [J18.9, A41.9] 10/27/2022 Yes    Multifocal pneumonia [J18.9] 11/05/2022 Yes    Pulmonary HTN [I27.20] 11/05/2022 Yes    CKD (chronic kidney disease), stage III [N18.30] 11/05/2022 Yes    Elevated CPK [R74.8] 10/27/2022 Yes     Chronic       Inpatient Medications Prescribed for Management of Current Problems:     Scheduled Meds:    amLODIPine  5 mg Oral Daily    atovaquone  1,500 mg Oral Daily    carvediloL  12.5 mg Oral BID    epoetin hira (PROCRIT) injection  50 Units/kg Subcutaneous Every Mon, Wed, Fri    hydrALAZINE  50 mg Oral Q8H    levothyroxine  25 mcg Oral Before breakfast    pantoprazole  40 mg Oral BID AC    predniSONE  15 mg Oral with lunch    Followed by    [START ON 12/18/2022] predniSONE  12.5 mg Oral with lunch    Followed by    [START ON 1/1/2023] predniSONE  10 mg Oral with lunch    [START ON 1/15/2023] predniSONE  5 mg Oral Daily    QUEtiapine  12.5 mg Oral QHS    senna-docusate 8.6-50 mg  1 tablet Oral BID    sodium chloride 0.9% flush bag IVPB   Intravenous 1 time in Clinic/HOD    white petrolatum   Topical (Top) Daily     Continuous Infusions:   As Needed: acetaminophen, albuterol-ipratropium, aluminum-magnesium hydroxide-simethicone, bisacodyL, cloNIDine, dextrose 10%, dextrose 10%, glucagon (human recombinant), glucose, glucose, heparin (porcine), heparin (porcine), heparin (porcine), heparin, porcine (PF), heparin, porcine (PF), insulin aspart U-100, meclizine, melatonin,  methocarbamoL, metoclopramide HCl, naloxone, ondansetron, prochlorperazine, simethicone, sodium chloride 0.9%, sodium chloride 0.9%, sodium chloride 0.9%, sodium chloride 0.9%, sodium chloride 0.9%, sodium chloride 0.9%, sucralfate    VTE Risk Mitigation (From admission, onward)         Ordered     heparin (porcine) injection 1,000 Units  As needed (PRN)         11/29/22 0857     heparin (porcine) injection 1,000 Units  As needed (PRN)         11/22/22 0832     heparin, porcine (PF) 100 unit/mL injection flush 500 Units  As needed (PRN)         11/17/22 1343     heparin, porcine (PF) 100 unit/mL injection flush 500 Units  As needed (PRN)         11/10/22 1430     heparin (porcine) injection 1,000 Units  As needed (PRN)         11/09/22 1601     heparin (porcine) injection 1,000 Units  As needed (PRN)         11/08/22 0854     Place sequential compression device  Until discontinued         10/25/22 1731     IP VTE HIGH RISK PATIENT  Once         10/17/22 1354     Reason for No Pharmacological VTE Prophylaxis  Once        Question:  Reasons:  Answer:  Risk of Bleeding    10/17/22 1354                I have completed this tele-visit without the assistance of a telepresenter.    The attending portion of this evaluation, treatment, and documentation was performed per Yaquelin Concepcion MD via Telemedicine AudioVisual using the secure Vidyo software platform with 2 way audio/video. The provider was located off-site and the patient is located in the hospital. The aforementioned video software was utilized to document the relevant history and physical exam.Secure eCareManager software platform was used instead of ELIKE for this visit.      Yaquelin Concepcion MD  Department of Hospital Medicine   [facility] - Intensive Care ([place]-)

## 2022-12-09 NOTE — ASSESSMENT & PLAN NOTE
Occurring intermittently during hospitalization but also when patient was younger  -associated with nausea, weakness; shifting eye movements present  -meclizine prn  -Neurology evaluation due to recent vasculitis diagnosis; MRI brain ordered 12/8

## 2022-12-09 NOTE — SUBJECTIVE & OBJECTIVE
Subjective:     Interval History: NAEO; patient much more alert today. Walking and working w PT/OT. She does not much recall much detail from yesterday after procedure. She denies any dizzy spells today. No changes on neuro exam.   MRI brain with no acute/subacute infarcts. No evidence to suggest CNS vasculitis.     Current Neurological Medications: as detailed below     Current Facility-Administered Medications   Medication Dose Route Frequency Provider Last Rate Last Admin    acetaminophen tablet 650 mg  650 mg Oral Q4H PRN Magui Cage MD   650 mg at 12/04/22 1636    albuterol-ipratropium 2.5 mg-0.5 mg/3 mL nebulizer solution 3 mL  3 mL Nebulization Q4H PRN Kandace Smith PA-C   3 mL at 11/02/22 0746    aluminum-magnesium hydroxide-simethicone 200-200-20 mg/5 mL suspension 30 mL  30 mL Oral QID PRN Kandace Smith PA-C   30 mL at 12/05/22 1002    amLODIPine tablet 5 mg  5 mg Oral Daily Haleigh Parks MD   5 mg at 12/09/22 0900    atovaquone 750 mg/5 mL oral liquid 1,500 mg  1,500 mg Oral Daily Haleigh Parks MD   1,500 mg at 12/09/22 0949    bisacodyL suppository 10 mg  10 mg Rectal Daily PRN Kandace Smith PA-C   10 mg at 11/21/22 1212    carvediloL tablet 12.5 mg  12.5 mg Oral BID Haleigh Parks MD   12.5 mg at 12/09/22 0948    cloNIDine tablet 0.1 mg  0.1 mg Oral Q8H PRN Haleigh Parks MD        dextrose 10% bolus 125 mL  12.5 g Intravenous PRN Immanuel Peres MD   Stopped at 11/08/22 2336    dextrose 10% bolus 250 mL  25 g Intravenous PRN Immanuel Peres MD        epoetin hira injection 2,880 Units  50 Units/kg Subcutaneous Every Mon, Wed, Fri James Gomez MD   2,880 Units at 12/07/22 1227    glucagon (human recombinant) injection 1 mg  1 mg Intramuscular PRN Magui Cage MD        glucose chewable tablet 16 g  16 g Oral PRN Kandace Smith PA-C        glucose chewable tablet 24 g  24 g Oral PRN Kandace Smith PA-C        heparin (porcine) injection 1,000 Units  1,000  Units Intra-Catheter PRN Blue Puente MD   1,000 Units at 12/03/22 1514    heparin (porcine) injection 1,000 Units  1,000 Units Intra-Catheter PRN James Gomez MD   1,000 Units at 11/22/22 1254    heparin (porcine) injection 1,000 Units  1,000 Units Intra-Catheter PRN James Gomez MD   1,000 Units at 12/03/22 1520    heparin, porcine (PF) 100 unit/mL injection flush 500 Units  500 Units Intravenous PRN Woo Palacio MD        heparin, porcine (PF) 100 unit/mL injection flush 500 Units  500 Units Intravenous PRN Woo Palacio MD        hydrALAZINE tablet 50 mg  50 mg Oral Q8H Haleigh Parks MD   50 mg at 12/09/22 0530    insulin aspart U-100 pen 1-10 Units  1-10 Units Subcutaneous PRN Haleigh Parks MD   4 Units at 12/09/22 0949    levothyroxine tablet 25 mcg  25 mcg Oral Before breakfast Haleigh Parks MD   25 mcg at 12/09/22 0530    meclizine tablet 25 mg  25 mg Oral TID PRN Haleigh Parks MD   25 mg at 12/07/22 1536    melatonin tablet 6 mg  6 mg Oral Nightly PRN Kandace Smith PA-C   6 mg at 12/04/22 2042    methocarbamoL tablet 500 mg  500 mg Oral TID PRN Kandace Smith PA-C   500 mg at 10/30/22 0058    metoclopramide HCl injection 5 mg  5 mg Intravenous Q6H PRN Ej Fregoso MD        naloxone 0.4 mg/mL injection 0.02 mg  0.02 mg Intravenous PRN Kandace Smith PA-C        ondansetron injection 4 mg  4 mg Intravenous Q8H PRN Bev Daly MD   4 mg at 12/05/22 1002    pantoprazole EC tablet 40 mg  40 mg Oral BID AC [place] Quinn MD   40 mg at 12/09/22 0530    predniSONE tablet 15 mg  15 mg Oral with lunch Haleigh Parks MD   15 mg at 12/08/22 1113    Followed by    [START ON 12/18/2022] predniSONE tablet 12.5 mg  12.5 mg Oral with lunch Haleigh Parks MD        Followed by    [START ON 1/1/2023] predniSONE tablet 10 mg  10 mg Oral with lunch Haleigh Parks MD        [START ON 1/15/2023] predniSONE tablet 5 mg  5 mg Oral Daily Haleigh Parks MD         prochlorperazine injection Soln 5 mg  5 mg Intravenous Q6H PRN Kandace Smith PA-C   5 mg at 10/24/22 0001    quetiapine split tablet 12.5 mg  12.5 mg Oral QHS Haleigh Parks MD   12.5 mg at 12/08/22 2053    senna-docusate 8.6-50 mg per tablet 1 tablet  1 tablet Oral BID Yaquelin Concepcion MD   1 tablet at 12/08/22 2052    simethicone chewable tablet 80 mg  1 tablet Oral QID PRN Kandace Smith PA-C   80 mg at 11/21/22 1311    sodium chloride 0.9% 250 mL flush bag   Intravenous 1 time in Clinic/HOD Woo Palacio MD   Held at 10/27/22 1700    sodium chloride 0.9% bolus 250 mL  250 mL Intravenous PRN James Gomez MD        sodium chloride 0.9% bolus 250 mL  250 mL Intravenous PRN James Gomez MD        sodium chloride 0.9% flush 10 mL  10 mL Intravenous PRN Woo Palacio MD        sodium chloride 0.9% flush 10 mL  10 mL Intravenous PRN Woo Palacio MD        sodium chloride 0.9% flush 10 mL  10 mL Intravenous PRN Woo Palacio MD        sodium chloride 0.9% flush 10 mL  10 mL Intravenous PRN Woo Palacio MD        sucralfate tablet 1 g  1 g Oral TID PRN Ej Fregoso MD   1 g at 11/30/22 1343    white petrolatum 41 % ointment   Topical (Top) Daily Haleigh Parks MD   Given at 12/09/22 0900     Facility-Administered Medications Ordered in Other Encounters   Medication Dose Route Frequency Provider Last Rate Last Admin    celecoxib capsule 400 mg  400 mg Oral Once Dieudonne Marshall MD        fentaNYL 50 mcg/mL injection  mcg   mcg Intravenous PRN Dieudonne Marshall MD   100 mcg at 12/21/21 0919    LIDOcaine (PF) 10 mg/ml (1%) injection 10 mg  1 mL Intradermal Once PRN Dieudonne Marshall MD        LIDOcaine (PF) 10 mg/ml (1%) injection 10 mg  1 mL Intradermal Once Dieudonne Marshall MD        midazolam (VERSED) 1 mg/mL injection 0.5-4 mg  0.5-4 mg Intravenous PRN Dieudonne Marshall MD   2 mg at 12/21/21 2507    ropivacaine 0.2%  Nimbus PainPRO Pump infusion 500 ML   Perineural Continuous Dieudonne Marshall MD   New Bag at 12/21/21 1153       Review of Systems   Constitutional:  Negative for appetite change, chills and fever.   HENT:  Negative for congestion, sore throat, trouble swallowing and voice change.    Eyes: Negative.    Respiratory:  Negative for cough and shortness of breath.    Cardiovascular:  Negative for chest pain and leg swelling.   Gastrointestinal:  Negative for abdominal distention, abdominal pain, constipation, nausea and vomiting.   Endocrine: Negative.    Genitourinary: Negative.    Musculoskeletal:  Negative for back pain and gait problem.   Skin: Negative.    Neurological:  Positive for dizziness. Negative for weakness and headaches.   Psychiatric/Behavioral: Negative.     Objective:     Vital Signs (Most Recent):  Temp: 98.6 °F (37 °C) (12/09/22 0919)  Pulse: 78 (12/09/22 0919)  Resp: 17 (12/09/22 0919)  BP: (!) 142/66 (12/09/22 0919)  SpO2: 96 % (12/09/22 0919)   Vital Signs (24h Range):  Temp:  [97.9 °F (36.6 °C)-98.6 °F (37 °C)] 98.6 °F (37 °C)  Pulse:  [65-78] 78  Resp:  [17-18] 17  SpO2:  [95 %-96 %] 96 %  BP: (135-143)/(61-66) 142/66     Weight: 66.3 kg (146 lb 2.6 oz)  Body mass index is 27.62 kg/m².    Physical Exam  Constitutional:       Appearance: Normal appearance.   HENT:      Head: Normocephalic and atraumatic.      Nose: Nose normal.      Mouth/Throat:      Mouth: Mucous membranes are moist.   Eyes:      Extraocular Movements: Extraocular movements intact.      Pupils: Pupils are equal, round, and reactive to light.   Pulmonary:      Effort: No respiratory distress.   Abdominal:      General: Bowel sounds are normal. There is no distension.      Palpations: Abdomen is soft.      Tenderness: There is no abdominal tenderness.   Musculoskeletal:         General: No swelling. Normal range of motion.      Cervical back: Normal range of motion.   Skin:     General: Skin is warm.      Capillary Refill:  Capillary refill takes less than 2 seconds.   Neurological:      General: No focal deficit present.      Mental Status: She is alert and oriented to person, place, and time. Mental status is at baseline.      Cranial Nerves: Cranial nerves 2-12 are intact.      Motor: Motor strength is normal.      Coordination: Finger-Nose-Finger Test normal.      Gait: Gait is intact.      Deep Tendon Reflexes:      Reflex Scores:       Bicep reflexes are 2+ on the right side and 2+ on the left side.       Brachioradialis reflexes are 2+ on the right side and 2+ on the left side.       Patellar reflexes are 2+ on the right side and 2+ on the left side.  Psychiatric:         Speech: Speech normal.       NEUROLOGICAL EXAMINATION:     MENTAL STATUS   Oriented to person, place, and time.   Attention: normal. Concentration: normal.   Speech: speech is normal   Level of consciousness: alert    CRANIAL NERVES   Cranial nerves II through XII intact.     CN III, IV, VI   Pupils are equal, round, and reactive to light.    MOTOR EXAM   Muscle bulk: normal  Right arm pronator drift: absent  Left arm pronator drift: absent    Strength   Strength 5/5 throughout.     REFLEXES     Reflexes   Right brachioradialis: 2+  Left brachioradialis: 2+  Right biceps: 2+  Left biceps: 2+  Right patellar: 2+  Left patellar: 2+  Right plantar: normal  Left plantar: normal  Right Lugo: absent  Left Lugo: absent    SENSORY EXAM   Light touch normal.     GAIT AND COORDINATION     Gait  Gait: normal     Coordination   Finger to nose coordination: normal    Tremor   Resting tremor: absent  Intention tremor: absent    Significant Labs: All pertinent lab results from the past 24 hours have been reviewed.    Significant Imaging: I have reviewed and interpreted all pertinent imaging results/findings within the past 24 hours.      MRI brain w/o contrast   FINDINGS:  Remote small infarct in left medial thalamus similar to the prior CT.  Periventricular and  subcortical white matter changes are present likely related to chronic microvascular disease.  Small remote left medial occipital cortical infarct.  There is no evidence of recent or remote hemorrhage.  No extra-axial collections are identified.  No hydrocephalus.  Skull base structures are unremarkable.

## 2022-12-09 NOTE — ASSESSMENT & PLAN NOTE
Patient with acute kidney injury likely due to microscopic polyangiitis.  WILFREDO is currently stable. Labs reviewed- Renal function/electrolytes with Estimated Creatinine Clearance: 4.8 mL/min (A) (based on SCr of 8.9 mg/dL (H)). according to latest data. Monitor urine output and serial BMP and adjust therapy as needed. Avoid nephrotoxins and renally dose meds for GFR listed above.   Nephrology following and patient receives HD as indicated.

## 2022-12-09 NOTE — ASSESSMENT & PLAN NOTE
Pulmonary Hypertension due to left heart disease  TTE (10/2022) EF 65%, G1DD  Home meds: Lisinopril, Toprol  - Active volume control with intermittent Hemodialysis per Nephrology   - Daily weights (standing if tolerated)  - Strict I/Os  - Fluid restriction 1.5 day  - Cardiac diet 2 g Na restriction

## 2022-12-09 NOTE — PLAN OF CARE
Update on referrals sent:    SNF:  [facility]  [facility] Denied- Unable to meet her needs  [facility] Denied- No available beds  OSNF Denied- No HD capabilities  [facility] Denied- Unable to meet her needs  [facility]  Denied- [facility]  [facility]  [facility]  [facility]   [place]     [facility]AC:  [facility] Accepted pending ability to get auth and SNF acceptance if possible.  [facility] Denied- MATILDA Cast LCSW  Neurocritical Care   [facility]  [id number]

## 2022-12-09 NOTE — ASSESSMENT & PLAN NOTE
SLP following  MBSS showing global weakness in swallowing as well as aspiration with thin liquids.   ENT consulted but patient did not tolerate laryngoscopy   - Dysphagia possibly due to previous stroke  - Briefly required NGT, worked with SLP  - NGT removed- being treated with nystatin swish and swallow for thrush  - GI consulted as well for concern of oropharyngeal candidiasis;  Low suspicion for esophageal candidiasis without odynophagia however can have if white plaques have been seen on oropharynx, ok to start fluconazole empirically for possible esophageal candidiasis  - EGD on 11/14 without abnormality; per GI, Suspect some aspect of esophageal dysmotility in setting of critical illness. No further testing at this time.  - Advanced to renal diet 11/22

## 2022-12-09 NOTE — SUBJECTIVE & OBJECTIVE
Telemedicine  This service was provided by [facility].    Patient was transferred to [facility] on:  12/03/2022     Chief Complaint   Patient presents with    Multiple complaints     Seen 2 other times since sept, cont with now fever, cough with blood      The patient location is: [id number]   Admitted 10/17/2022 10:12 AM    Interval History / Events Overnight:   The patient is able to provide adequate history. Additional history was obtained from past medical records. No significant events reported by Nursing. Hypoglycemia after insulin.  UA not yet collected.  Patient complains of dysuria. Symptoms have been unchanged since yesterday. Associated symptoms include: fatigue. Symptoms are stable.     Lab test(s) reviewed: H&H stable    Review of Systems   Constitutional:  Negative for fever.   Respiratory:  Negative for shortness of breath.      Objective:     Vital Signs (Most Recent):  Temp: 97.7 °F (36.5 °C) (12/09/22 1151)  Pulse: 69 (12/09/22 1151)  Resp: 16 (12/09/22 1151)  BP: (!) 124/58 (12/09/22 1151)  SpO2: 97 % (12/09/22 1151)   Vital Signs (24h Range):  Temp:  [97.7 °F (36.5 °C)-98.6 °F (37 °C)] 97.7 °F (36.5 °C)  Pulse:  [68-78] 69  Resp:  [16-18] 16  SpO2:  [95 %-97 %] 97 %  BP: (124-143)/(58-66) 124/58     Weight: 66.3 kg (146 lb 2.6 oz)  Body mass index is 27.62 kg/m².  No intake or output data in the 24 hours ending 12/09/22 1343   Physical Exam  Constitutional:       General: She is not in acute distress.     Appearance: Normal appearance.   Eyes:      General: Lids are normal. No scleral icterus.        Right eye: No discharge.         Left eye: No discharge.      Conjunctiva/sclera: Conjunctivae normal.   Neck:      Trachea: Phonation normal.   Cardiovascular:      Rate and Rhythm: Normal rate.      Comments: Monitor / Vital signs reviewed at time of visit  Pulmonary:      Effort: Pulmonary effort is normal. No tachypnea, accessory muscle usage or respiratory distress.    Abdominal:      General: There is no distension.   Neurological:      Mental Status: She is alert. She is not disoriented.   Psychiatric:         Attention and Perception: Attention normal.         Mood and Affect: Affect normal.         Behavior: Behavior is cooperative.       Significant Labs:  Recent Labs   Lab 08/02/22  1156   HGBA1C 5.5       Recent Labs   Lab 12/09/22  1151 12/09/22  1156 12/09/22  1217   POCTGLUCOSE 40* 36* 132*       Recent Labs   Lab 12/07/22  0440 12/08/22  0448 12/09/22  0352   WBC 10.93 9.43 8.81   HGB 8.8* 8.4* 7.8*   HCT 28.4* 26.4* 24.5*   * 141* 128*       Recent Labs   Lab 12/07/22  0440 12/08/22  0448 12/09/22  0352   GRAN 74.1*  8.1* 68.6  6.5 65.8  5.8   LYMPH 17.9*  2.0 21.3  2.0 24.3  2.1   MONO 6.3  0.7 8.2  0.8 8.3  0.7   EOS 0.0 0.0 0.0       Recent Labs   Lab 12/07/22  0440 12/08/22 0448 12/08/22  0449 12/09/22  0352    138  --  137   K 4.6 4.4  --  4.8    103  --  102   CO2 21* 23  --  21*   BUN 61* 69*  --  72*   CREATININE 8.4* 8.9*  --  8.8*   GLU 90 82  --  74   CALCIUM 7.9* 7.7*  --  7.5*   MG 1.9  --  1.8 1.8   PHOS 4.9*  --  4.9* 5.4*       Recent Labs   Lab 12/07/22  0440   INR 1.1       Recent Labs   Lab 09/24/22  1120 10/14/22  1638 10/17/22  1200 10/23/22  1822 10/30/22  0426 11/25/22  0255   PROCAL 0.06 0.12  --   --   --   --    LACTATE 0.7  --  0.9 0.9  --   --    DDIMER  --  1.96*  --   --   --   --    FERRITIN  --   --   --   --  374* 588*       SARS-CoV2 (COVID-19) Qualitative PCR (no units)   Date Value   10/24/2022 Not Detected   02/14/2022 Not Detected   09/25/2020 Not Detected   05/21/2020 Not Detected     SARS-CoV-2 RNA, Amplification, Qual (no units)   Date Value   10/17/2022 Negative     POC Rapid COVID (no units)   Date Value   09/24/2022 Negative       ECG Results              EKG 12-lead (Final result)  Result time 10/17/22 14:26:15      Final result by Interface, Lab In [facility] (10/17/22 14:26:15)                    Narrative:    Test Reason : R06.02,    Vent. Rate : 093 BPM     Atrial Rate : 093 BPM     P-R Int : 126 ms          QRS Dur : 070 ms      QT Int : 356 ms       P-R-T Axes : 048 052 030 degrees     QTc Int : 442 ms    Normal sinus rhythm  Normal ECG  When compared with ECG of 14-OCT-2022 14:26,  Limb lead reversal has been corrected  Confirmed by Jc Barbosa MD ([id number]) on 10/17/2022 2:26:04 PM    Referred By: [email or web address]   SELF           Confirmed By:Jc Barbosa MD                                    Results for orders placed during the hospital encounter of 10/17/22    Echo    Interpretation Summary  · The left ventricle is normal in size with normal systolic function. The estimated ejection fraction is 65%.  · Normal right ventricular size with normal right ventricular systolic function.  · Grade I left ventricular diastolic dysfunction.  · Mild tricuspid regurgitation.  · There is pulmonary hypertension. The estimated PA systolic pressure is 56 mmHg.  · Normal to low central venous pressure (3 mmHg).      MRI Brain Without Contrast  Narrative: EXAMINATION:  MRI BRAIN WITHOUT CONTRAST    CLINICAL HISTORY:  Dizziness, non-specific;    TECHNIQUE:  Multiplanar multisequence MR imaging of the brain was performed without contrast.    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  Periventricular and subcortical white matter changes are present likely related to chronic microvascular disease.  Small remote left medial occipital cortical infarct.  There is no evidence of recent or remote hemorrhage.  No extra-axial collections are identified.  No hydrocephalus.  Skull base structures are unremarkable.  Impression: Mild chronic ischemic changes are present, as above.    Electronically signed by: Timothy Bess MD  Date:    12/09/2022  Time:    08:32      Labs and Imaging within the last 24 hours listed above were reviewed.       Diet: Diet renal [facility]  Facility; Lactose Restricted  Significant LDAs:   IV Access Type: Peripheral and Dialysis Access  Urinary Catheter Indication if present: Patient Does Not Have Urinary Catheter  Other Lines/Tubes/Drains:    HIGH RISK CONDITION(S):   Patient has a condition that poses threat to life and bodily function: Acute Renal Failure     Goals of Care:    Previous admission:  10/14/22  Likely prognosis:  Fair  Code Status: Full Code  Comfort Only: No  Hospice: No  Goals at discharge: remain at home, with physician follow-up    Discharge Planning   ANTHONY: 12/12/2022     Code Status: Full Code   Is the patient medically ready for discharge?: No    Reason for patient still in hospital (select all that apply): Patient trending condition, Treatment, and Pending disposition  Discharge Plan A: Skilled Nursing Facility   Discharge Delays: None known at this time

## 2022-12-09 NOTE — ASSESSMENT & PLAN NOTE
As of 10/26/22 strongly positive MPO Ab (in contrast to borderline positive PR3), consistent with clinical picture of microscopic polyangiitis with pulmonary, and renal involvement after presenting with acute hypoxemic respiratory failure, hemoptysis, and acute renal failure.  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   3)  CHANGES SUGGESTIVE OF FOCAL ACUTE PYELONEPHRITIS.   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 Currently on oral steroid taper   - plan for 20mg IV daily on 11/6 for 14 days (last dose of 20mg equivalent 11/20/2022); completing oral Prednisone per PEXIVAS steroid taper  - apheresis: underwent PLEX 10/26, 10/27, 10/30, 11/1, 11/2, 11/3  - rituximab every 7 days for 4 doses: Dose #1: 10/27, #2 11/3, #3 11/10, and #4 given 11/17  - cyclophosphamide every 14 days for 2 doses: 10/27, 11/10  - Opportunistic Infection ppx: atovaquone 1500mg PO daily  - GI bleed prophylaxis: pantoprazole 40mg daily

## 2022-12-09 NOTE — ASSESSMENT & PLAN NOTE
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 She has had a couple of episodes but most severe was 12/7/22. Mostly they have been positional and she does have some history of vertigo. On exam patient initially had some Left sided weakness and dysmetria L>R w FTN but this was after her tunneled cath procedure where she was not at her baseline. There was question for possible cortical/vascular pathology.  Repeat MRI brain with no acute infarct or changes to suggest CNS vasculitis. Compared with prior MRI 11/1 and stable. Today patient does not have any focal deficits.   She denies any dizzy spells while working w PT.   Overall picture not consistent with CNS vasculitis and dizzy spells as positional seem to be pre syncope.     Recommendations  -- Orthostatic BP   -- Continue with fall precautions  -- PT/OT   --  Will benefit from OP workup with cardiology and possible tilt table test.

## 2022-12-09 NOTE — PT/OT/SLP PROGRESS
"Physical Therapy Treatment    Patient Name:  Kristin Goodman   MRN:  [id number]    Recommendations:     Discharge Recommendations: nursing facility, skilled  Discharge Equipment Recommendations: walker, rolling  Barriers to discharge: Pt currently requiring increased level of assistance with mobility      Assessment:     Kristin Goodman is a 77 y.o. female admitted with a medical diagnosis of Microscopic polyangiitis.  She presents with the following impairments/functional limitations: weakness, impaired endurance, impaired functional mobility, impaired balance, gait instability, decreased lower extremity function. Pt continues to present with increased difficulty performing transfers from lower surfaces, requiring modA at hips to facilitate anterior weight shift and hip clearance. Pt demonstrated improved activity tolerance and amb distance of 80' + 10' + 80' RW CGA-SBA with prolong standing break and seated rest break in between bouts, respectively. Pt is still at fall risk as pt presented with decreased obey. Pt continues to benefit from skilled PT services while in house in order to address the aforementioned deficits.      Rehab Prognosis: Good; patient would benefit from acute skilled PT services to address these deficits and reach maximum level of function.    Recent Surgery: Procedure(s) (LRB):  EGD (ESOPHAGOGASTRODUODENOSCOPY) (N/A) 25 Days Post-Op    Plan:     During this hospitalization, patient to be seen 3 x/week to address the identified rehab impairments via gait training, therapeutic activities, therapeutic exercises, neuromuscular re-education and progress toward the following goals:    Plan of Care Expires:  12/28/22    Subjective     "I live over there (pointing out window)"  Pain/Comfort:  Pain Rating 1: 0/10  Pain Rating Post-Intervention 1: 0/10      Objective:     Communicated with RN prior to session.  Patient found up in chair with  (No lines intact) upon PT entry to room.     General " Precautions: Standard, fall  Orthopedic Precautions: N/A  Braces: N/A  Respiratory Status: Room air     Functional Mobility:  Transfers:     Sit to Stand:  moderate assistance with rolling walker  Bed to Chair: stand by assistance with  rolling walker  using  Step Transfer  Gait: Pt amb 80' + 10' + 80' RW CGA-SBA with prolong standing break and seated rest break in between bouts, respectively. Pt presented with decreased obey, B shortened step, head down, RW slightly anterior.   Balance:   Good sitting balance  Fair standing balance      AM-PAC 6 CLICK MOBILITY  Turning over in bed (including adjusting bedclothes, sheets and blankets)?: 4  Sitting down on and standing up from a chair with arms (e.g., wheelchair, bedside commode, etc.): 3  Moving from lying on back to sitting on the side of the bed?: 3  Moving to and from a bed to a chair (including a wheelchair)?: 3  Need to walk in hospital room?: 3  Climbing 3-5 steps with a railing?: 2  Basic Mobility Total Score: 18       Treatment & Education:  Discussed pacing activities and energy conservation  Tolerated 3-5 min of static standing RW SBA while talking to MD and visual scanning out the window    Educated pt on PT role/POC  Educated pt on importance of OOB activity and daily ambulation   Pt educated on proper body mechanics, safety techniques, and energy conservation with PT facilitation and cueing throughout session   Pt verbalized understanding      Patient left up in chair with all lines intact, call button in reach, RN notified.    GOALS:   Multidisciplinary Problems       Physical Therapy Goals          Problem: Physical Therapy    Goal Priority Disciplines Outcome Goal Variances Interventions   Physical Therapy Goal     PT, PT/OT Ongoing, Progressing     Description: Goals remain appropriate and extended to: 2022     Patient will increase functional independence with mobility by performin. Supine to sit with contact guard assistance  2.  Sit to supine with contact guard assistance MET 11/30  2a. Sit to supine independently  3. Sit to stand transfer with minimum assistance MET 11/09  3a. STS supvn LRAD  4. Gait  x 40 feet with minimum assistance using LRAD as needed MET 12/09  4a. Gait x 250' LRAD supvn  5. Lower extremity exercise program x10 reps per handout, with independence                        Time Tracking:     PT Received On: 12/09/22  PT Start Time: 0958     PT Stop Time: 1023  PT Total Time (min): 25 min     Billable Minutes: Gait Training 25    Treatment Type: Treatment  PT/PTA: PT     PTA Visit Number: 3     12/09/2022

## 2022-12-10 LAB
ANION GAP SERPL CALC-SCNC: 15 MMOL/L (ref 8–16)
BASOPHILS # BLD AUTO: 0.01 K/UL (ref 0–0.2)
BASOPHILS NFR BLD: 0.1 % (ref 0–1.9)
BUN SERPL-MCNC: 79 MG/DL (ref 8–23)
CALCIUM SERPL-MCNC: 7.7 MG/DL (ref 8.7–10.5)
CHLORIDE SERPL-SCNC: 103 MMOL/L (ref 95–110)
CO2 SERPL-SCNC: 20 MMOL/L (ref 23–29)
CREAT SERPL-MCNC: 9.1 MG/DL (ref 0.5–1.4)
DIFFERENTIAL METHOD: ABNORMAL
EOSINOPHIL # BLD AUTO: 0 K/UL (ref 0–0.5)
EOSINOPHIL NFR BLD: 0 % (ref 0–8)
ERYTHROCYTE [DISTWIDTH] IN BLOOD BY AUTOMATED COUNT: 14.8 % (ref 11.5–14.5)
EST. GFR  (NO RACE VARIABLE): 4.2 ML/MIN/1.73 M^2
GLUCOSE SERPL-MCNC: 86 MG/DL (ref 70–110)
HCT VFR BLD AUTO: 25.4 % (ref 37–48.5)
HGB BLD-MCNC: 7.8 G/DL (ref 12–16)
IMM GRANULOCYTES # BLD AUTO: 0.15 K/UL (ref 0–0.04)
IMM GRANULOCYTES NFR BLD AUTO: 2.1 % (ref 0–0.5)
LYMPHOCYTES # BLD AUTO: 1.7 K/UL (ref 1–4.8)
LYMPHOCYTES NFR BLD: 23.3 % (ref 18–48)
MAGNESIUM SERPL-MCNC: 1.9 MG/DL (ref 1.6–2.6)
MCH RBC QN AUTO: 28.2 PG (ref 27–31)
MCHC RBC AUTO-ENTMCNC: 30.7 G/DL (ref 32–36)
MCV RBC AUTO: 92 FL (ref 82–98)
MONOCYTES # BLD AUTO: 0.4 K/UL (ref 0.3–1)
MONOCYTES NFR BLD: 5.4 % (ref 4–15)
NEUTROPHILS # BLD AUTO: 5 K/UL (ref 1.8–7.7)
NEUTROPHILS NFR BLD: 69.1 % (ref 38–73)
NRBC BLD-RTO: 0 /100 WBC
PHOSPHATE SERPL-MCNC: 5.3 MG/DL (ref 2.7–4.5)
PLATELET # BLD AUTO: 130 K/UL (ref 150–450)
PMV BLD AUTO: 11.2 FL (ref 9.2–12.9)
POTASSIUM SERPL-SCNC: 5.3 MMOL/L (ref 3.5–5.1)
RBC # BLD AUTO: 2.77 M/UL (ref 4–5.4)
SODIUM SERPL-SCNC: 138 MMOL/L (ref 136–145)
WBC # BLD AUTO: 7.26 K/UL (ref 3.9–12.7)

## 2022-12-10 PROCEDURE — 25000003 PHARM REV CODE 250: Performed by: INTERNAL MEDICINE

## 2022-12-10 PROCEDURE — 85025 COMPLETE CBC W/AUTO DIFF WBC: CPT | Performed by: STUDENT IN AN ORGANIZED HEALTH CARE EDUCATION/TRAINING PROGRAM

## 2022-12-10 PROCEDURE — 99233 PR SUBSEQUENT HOSPITAL CARE,LEVL III: ICD-10-PCS | Mod: 95,,, | Performed by: INTERNAL MEDICINE

## 2022-12-10 PROCEDURE — 80048 BASIC METABOLIC PNL TOTAL CA: CPT

## 2022-12-10 PROCEDURE — 36415 COLL VENOUS BLD VENIPUNCTURE: CPT

## 2022-12-10 PROCEDURE — 20600001 HC STEP DOWN PRIVATE ROOM

## 2022-12-10 PROCEDURE — 84100 ASSAY OF PHOSPHORUS: CPT

## 2022-12-10 PROCEDURE — 83735 ASSAY OF MAGNESIUM: CPT

## 2022-12-10 PROCEDURE — 99233 SBSQ HOSP IP/OBS HIGH 50: CPT | Mod: 95,,, | Performed by: INTERNAL MEDICINE

## 2022-12-10 PROCEDURE — 25000003 PHARM REV CODE 250: Performed by: HOSPITALIST

## 2022-12-10 PROCEDURE — 63600175 PHARM REV CODE 636 W HCPCS: Performed by: INTERNAL MEDICINE

## 2022-12-10 RX ORDER — CARVEDILOL 25 MG/1
25 TABLET ORAL 2 TIMES DAILY
Status: DISCONTINUED | OUTPATIENT
Start: 2022-12-10 | End: 2022-12-13 | Stop reason: HOSPADM

## 2022-12-10 RX ORDER — CARVEDILOL 12.5 MG/1
12.5 TABLET ORAL ONCE
Status: COMPLETED | OUTPATIENT
Start: 2022-12-10 | End: 2022-12-10

## 2022-12-10 RX ADMIN — AMLODIPINE BESYLATE 5 MG: 5 TABLET ORAL at 09:12

## 2022-12-10 RX ADMIN — CARVEDILOL 12.5 MG: 12.5 TABLET, FILM COATED ORAL at 09:12

## 2022-12-10 RX ADMIN — ATOVAQUONE 1500 MG: 750 SUSPENSION ORAL at 12:12

## 2022-12-10 RX ADMIN — PANTOPRAZOLE SODIUM 40 MG: 40 TABLET, DELAYED RELEASE ORAL at 05:12

## 2022-12-10 RX ADMIN — WHITE PETROLATUM: 1.75 OINTMENT TOPICAL at 12:12

## 2022-12-10 RX ADMIN — HYDRALAZINE HYDROCHLORIDE 50 MG: 50 TABLET ORAL at 02:12

## 2022-12-10 RX ADMIN — SENNOSIDES AND DOCUSATE SODIUM 1 TABLET: 50; 8.6 TABLET ORAL at 09:12

## 2022-12-10 RX ADMIN — SODIUM ZIRCONIUM CYCLOSILICATE 5 G: 5 POWDER, FOR SUSPENSION ORAL at 12:12

## 2022-12-10 RX ADMIN — HYDRALAZINE HYDROCHLORIDE 50 MG: 50 TABLET ORAL at 05:12

## 2022-12-10 RX ADMIN — CARVEDILOL 12.5 MG: 12.5 TABLET, FILM COATED ORAL at 12:12

## 2022-12-10 RX ADMIN — QUETIAPINE FUMARATE 12.5 MG: 25 TABLET ORAL at 09:12

## 2022-12-10 RX ADMIN — CARVEDILOL 25 MG: 25 TABLET, FILM COATED ORAL at 09:12

## 2022-12-10 RX ADMIN — PREDNISONE 15 MG: 5 TABLET ORAL at 12:12

## 2022-12-10 RX ADMIN — HYDRALAZINE HYDROCHLORIDE 50 MG: 50 TABLET ORAL at 11:12

## 2022-12-10 RX ADMIN — LEVOTHYROXINE SODIUM 25 MCG: 25 TABLET ORAL at 05:12

## 2022-12-10 NOTE — ASSESSMENT & PLAN NOTE
- Improving urine output  - Nephrology following- re-evaluating daily for need for Intermittent HD; permacath placement on 12/8  - improving

## 2022-12-10 NOTE — ASSESSMENT & PLAN NOTE
Patient with acute kidney injury likely due to microscopic polyangiitis.  WILFREDO is currently stable. Labs reviewed- Renal function/electrolytes with Estimated Creatinine Clearance: 4.8 mL/min (A) (based on SCr of 8.8 mg/dL (H)). according to latest data. Monitor urine output and serial BMP and adjust therapy as needed. Avoid nephrotoxins and renally dose meds for GFR listed above.   Nephrology following and patient receives HD as indicated.

## 2022-12-10 NOTE — PROGRESS NOTES
[facility] - Intensive Care ([place] [id number])  [facility] Medicine  Telemedicine Progress Note    Patient Name: Kristin Goodman  MRN: [id number]  Patient Class: IP- Inpatient   Admission Date: 10/17/2022  Length of Stay: 53 days  Attending Physician: Yaquelin Concepcion MD  Primary Care Provider: Lori Hernandez MD      Subjective:     Principal Problem:Microscopic polyangiitis    HPI:  Kristin Goodman is a 77 y.o. female with a past medical history of HTN, hypothyroidism, HFpEF, and osteoarthritis of the arm who has presented to the ED for cough, SOB, and weakness. Daughter is present at the bedside. Patient presented to the ED on 9/24 with hemoptysis, CT and CXR showed high suspicion for multilobar pneumonia. Patient was discharged with a 10-day course of levofloxacin and an albuterol inhaler. Patient followed up with her PCP on 10/4 with slight improvement in symptoms and went back to work. Patient continued to have worsening symptoms and presented to the ED again on 10/14 for evaluation and no interventions were done. Patient endorses fevers over the last few days up to 102.4 F with progressive SOB. She endorses hemoptysis with moderate amount of blood, generalized weakness, productive cough, SOB, and loss of appetite. Denies chest pain, nausea, vomiting, abdominal pain, or urinary changes.    ED: hypertensive up to 219/93 and tachycardic up to 117. Oxygen saturation on 92% on RA, placed on 5L NC with sats >97%. CBC remarkable for leukocytosis of 16.03 and Hb 7.7. K 3.3. Cr 1.6, baseline ~1.0. . Troponin 0.061. COVID and flu negative. EKG NSR. CT chest with contrast pending at time of admission. Given home amlodipine and lisinopril. Given 500mL NS, IV azithromycin, cefepime and vanc.       Overview/Hospital Course:  HM Course:  Ms. Goodman was admitted to Hospital Medicine for management of multifocal pneumonia and acute hypoxemic respiratory failure after presenting to the ED with fevers, cough, hemoptysis, and  dyspnea. She required escalation to the MICU due to abrupt decline in her respiratory status, associated with hemoptysis and requiring NIPPV. CT chest showed bilateral patchy ground glass opacities. Labs additionally were notable for acute renal failure on CKD3. Pulmonology was consulted while under the care of [facility] and felt this picture was consistent with diffuse alveolar hemorrhage.     ICU Course:   Her workup revealed a strongly positive MPO Ab consistent with clinical picture of microscopic polyangiitis with pulmonary and renal involvement. She underwent Trialysis catheter placement 10/25/2022 in the Medical ICU. 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     Rheumatology was consulted, extensive workup revealed MITUL + 1:2560 homogenous, +dsDNA, normal complements, PR3 1.2 (slight +),  (+), cANCA + 1:80, pANCA neg, GBMAb neg, trace cryos. Due to concern for MPA, she was started on pulse dose IV methylprednisolone 1000mg for 3 days, and plasmapheresis under the guidance of Transfusion Medicine. Patient was placed on steroid taper and completed PLEX. She additionally received rituximab and cyclophosphamide. OI prophylaxis was provided with atovaquone due to a sulfa allergy.     Nephrology was consulted for evaluation of acute renal failure in the setting of MPA. She did require SLED in the ICU for renal clearance and remains on intermittent hemodialysis. She is expected to continue HD once discharged.     Her acute hypoxemic respiratory failure improved and she was weaned off of supplemental oxygen. She stepped down to [facility] 10/28.     HM Course:  She underwent MBBS for evaluation of dysphagia, which showed global delayed initiation of swallow  and aspiration with thin liquids. Due to concern for possible prior stroke, head imaging was completed and negative for acute finding. She was NPO with NG tube. ENT was consulted for evaluation of dysphagia and cervical adenopathy.  Patient did not tolerate laryngoscopy; unable to visualize vocal cords but given her lack of hoarseness, they did not suspect vocal fold paresis/paralysis. MRI recommended by rheum to fully rule out any potential neurological cause of the patient's dysphagia; MRI demonstrated remote left thalamic lacunar type infarct and punctate remote left cerebellar infarcts.  She continued to work with speech therapy.  EGD completed 11/14 without abnormalities.  Per GI, Suspect some aspect of esophageal dysmotility in setting of critical illness. No further testing at this time.  Per SLP, diet advanced on 11/15 and NG tube removed.    Her other chronic medical conditions including hypothyroidism, diastolic CHF, and hypertension were managed with her home medications, with dose adjustments as needed.     Patient was noted to have downtrending Hg 11/17- required 1u pRBC. Rheum continued to follow for further steroid dosing. Patient was started on 2g NaCl tab TID for SIADH. SLP recommended mechanical soft diet with thin liquids 11/17. Na normalized 11/18. Hg improved to 8.5 following 1u pRBC. Patient had some hyperkalemia- started on scheduled lokelma. Patient continued to have improved UOP; however, BUN: Cr ratio uptrended. Nephro decided to hang off on HD as patient was having 500-600mL/24hr. He was started on scheduled NaHCO3 as ewll. Patient underwent HD 11/23. Patient had drop in Hg once again- 6.5 on 11/25. Given 1u pRBC (2nd unit). Underwent HD 11/30. Nephrology following to re-evaluate daily for need for HD and permacath placement.       Telemedicine  This service was provided by [facility].    Patient was transferred to [facility] on:  12/03/2022     Chief Complaint   Patient  presents with    Multiple complaints     Seen 2 other times since sept, cont with now fever, cough with blood      The patient location is: [id number]   Admitted 10/17/2022 10:12 AM    Interval History / Events Overnight:   The patient is able to provide adequate history. Additional history was obtained from past medical records. No significant events reported by Nursing. Hypoglycemia after insulin.  UA not yet collected.  Patient complains of dysuria. Symptoms have been unchanged since yesterday. Associated symptoms include: fatigue. Symptoms are stable.     Lab test(s) reviewed: H&H stable    Review of Systems   Constitutional:  Negative for fever.   Respiratory:  Negative for shortness of breath.      Objective:     Vital Signs (Most Recent):  Temp: 97.7 °F (36.5 °C) (12/09/22 1151)  Pulse: 69 (12/09/22 1151)  Resp: 16 (12/09/22 1151)  BP: (!) 124/58 (12/09/22 1151)  SpO2: 97 % (12/09/22 1151)   Vital Signs (24h Range):  Temp:  [97.7 °F (36.5 °C)-98.6 °F (37 °C)] 97.7 °F (36.5 °C)  Pulse:  [68-78] 69  Resp:  [16-18] 16  SpO2:  [95 %-97 %] 97 %  BP: (124-143)/(58-66) 124/58     Weight: 66.3 kg (146 lb 2.6 oz)  Body mass index is 27.62 kg/m².  No intake or output data in the 24 hours ending 12/09/22 1343   Physical Exam  Constitutional:       General: She is not in acute distress.     Appearance: Normal appearance.   Eyes:      General: Lids are normal. No scleral icterus.        Right eye: No discharge.         Left eye: No discharge.      Conjunctiva/sclera: Conjunctivae normal.   Neck:      Trachea: Phonation normal.   Cardiovascular:      Rate and Rhythm: Normal rate.      Comments: Monitor / Vital signs reviewed at time of visit  Pulmonary:      Effort: Pulmonary effort is normal. No tachypnea, accessory muscle usage or respiratory distress.   Abdominal:      General: There is no distension.   Neurological:      Mental Status: She is alert. She is not disoriented.   Psychiatric:         Attention and  Perception: Attention normal.         Mood and Affect: Affect normal.         Behavior: Behavior is cooperative.       Significant Labs:  Recent Labs   Lab 08/02/22  1156   HGBA1C 5.5       Recent Labs   Lab 12/09/22  1151 12/09/22  1156 12/09/22  1217   POCTGLUCOSE 40* 36* 132*       Recent Labs   Lab 12/07/22  0440 12/08/22  0448 12/09/22  0352   WBC 10.93 9.43 8.81   HGB 8.8* 8.4* 7.8*   HCT 28.4* 26.4* 24.5*   * 141* 128*       Recent Labs   Lab 12/07/22  0440 12/08/22  0448 12/09/22  0352   GRAN 74.1*  8.1* 68.6  6.5 65.8  5.8   LYMPH 17.9*  2.0 21.3  2.0 24.3  2.1   MONO 6.3  0.7 8.2  0.8 8.3  0.7   EOS 0.0 0.0 0.0       Recent Labs   Lab 12/07/22  0440 12/08/22  0448 12/08/22  0449 12/09/22  0352    138  --  137   K 4.6 4.4  --  4.8    103  --  102   CO2 21* 23  --  21*   BUN 61* 69*  --  72*   CREATININE 8.4* 8.9*  --  8.8*   GLU 90 82  --  74   CALCIUM 7.9* 7.7*  --  7.5*   MG 1.9  --  1.8 1.8   PHOS 4.9*  --  4.9* 5.4*       Recent Labs   Lab 12/07/22  0440   INR 1.1       Recent Labs   Lab 09/24/22  1120 10/14/22  1638 10/17/22  1200 10/23/22  1822 10/30/22  0426 11/25/22  0255   PROCAL 0.06 0.12  --   --   --   --    LACTATE 0.7  --  0.9 0.9  --   --    DDIMER  --  1.96*  --   --   --   --    FERRITIN  --   --   --   --  374* 588*       SARS-CoV2 (COVID-19) Qualitative PCR (no units)   Date Value   10/24/2022 Not Detected   02/14/2022 Not Detected   09/25/2020 Not Detected   05/21/2020 Not Detected     SARS-CoV-2 RNA, Amplification, Qual (no units)   Date Value   10/17/2022 Negative     POC Rapid COVID (no units)   Date Value   09/24/2022 Negative       ECG Results              EKG 12-lead (Final result)  Result time 10/17/22 14:26:15      Final result by Interface, Lab In [facility] (10/17/22 14:26:15)                   Narrative:    Test Reason : R06.02,    Vent. Rate : 093 BPM     Atrial Rate : 093 BPM     P-R Int : 126 ms          QRS Dur : 070 ms      QT Int : 356 ms        P-R-T Axes : 048 052 030 degrees     QTc Int : 442 ms    Normal sinus rhythm  Normal ECG  When compared with ECG of 14-OCT-2022 14:26,  Limb lead reversal has been corrected  Confirmed by Jc Barbosa MD ([id number]) on 10/17/2022 2:26:04 PM    Referred By: [email or web address]   SELF           Confirmed By:Jc Barbosa MD                                    Results for orders placed during the hospital encounter of 10/17/22    Echo    Interpretation Summary  · The left ventricle is normal in size with normal systolic function. The estimated ejection fraction is 65%.  · Normal right ventricular size with normal right ventricular systolic function.  · Grade I left ventricular diastolic dysfunction.  · Mild tricuspid regurgitation.  · There is pulmonary hypertension. The estimated PA systolic pressure is 56 mmHg.  · Normal to low central venous pressure (3 mmHg).      MRI Brain Without Contrast  Narrative: EXAMINATION:  MRI BRAIN WITHOUT CONTRAST    CLINICAL HISTORY:  Dizziness, non-specific;    TECHNIQUE:  Multiplanar multisequence MR imaging of the brain was performed without contrast.    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  Periventricular and subcortical white matter changes are present likely related to chronic microvascular disease.  Small remote left medial occipital cortical infarct.  There is no evidence of recent or remote hemorrhage.  No extra-axial collections are identified.  No hydrocephalus.  Skull base structures are unremarkable.  Impression: Mild chronic ischemic changes are present, as above.    Electronically signed by: Timothy Bess MD  Date:    12/09/2022  Time:    08:32      Labs and Imaging within the last 24 hours listed above were reviewed.       Diet: Diet renal [facility]; Lactose Restricted  Significant LDAs:   IV Access Type: Peripheral and Dialysis Access  Urinary Catheter Indication if present: Patient Does Not Have  Urinary Catheter  Other Lines/Tubes/Drains:    HIGH RISK CONDITION(S):   Patient has a condition that poses threat to life and bodily function: Acute Renal Failure     Goals of Care:    Previous admission:  10/14/22  Likely prognosis:  Fair  Code Status: Full Code  Comfort Only: No  Hospice: No  Goals at discharge: remain at home, with physician follow-up    Discharge Planning   ANTHONY: 12/12/2022     Code Status: Full Code   Is the patient medically ready for discharge?: No    Reason for patient still in hospital (select all that apply): Patient trending condition, Treatment, and Pending disposition  Discharge Plan A: Skilled Nursing Facility   Discharge Delays: None known at this time      Assessment/Plan:      * Microscopic polyangiitis  As of 10/26/22 strongly positive MPO Ab (in contrast to borderline positive PR3), consistent with clinical picture of microscopic polyangiitis with pulmonary, and renal involvement after presenting with acute hypoxemic respiratory failure, hemoptysis, and acute renal failure.  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   3)  CHANGES SUGGESTIVE OF FOCAL ACUTE PYELONEPHRITIS.   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 Currently on oral steroid taper   - plan for 20mg IV daily on 11/6 for 14 days (last dose of 20mg equivalent  11/20/2022); completing oral Prednisone per PEXIVAS steroid taper  - apheresis: underwent PLEX 10/26, 10/27, 10/30, 11/1, 11/2, 11/3  - rituximab every 7 days for 4 doses: Dose #1: 10/27, #2 11/3, #3 11/10, and #4 given 11/17  - cyclophosphamide every 14 days for 2 doses: 10/27, 11/10  - Opportunistic Infection ppx: atovaquone 1500mg PO daily  - GI bleed prophylaxis: pantoprazole 40mg BID    Dizzy spells  Occurring intermittently during hospitalization but also when patient was younger  -associated with nausea, weakness; shifting eye movements present  -meclizine prn  -Neurology evaluation due to recent vasculitis diagnosis; MRI brain ordered 12/8 unremarkable  - Neurology recommends:   -- PT/OT for vestibular therapy   -- OP workup with cardiology and possible tilt table test   -- OP Neurology follow up for BPPV     Anemia due to chronic kidney disease  - due to CKD  - Hg stable  - Monitor trend     Primary pauci-immune necrotizing and crescentic glomerulonephritis  Patient with acute kidney injury likely due to microscopic polyangiitis.  WILFREDO is currently stable. Labs reviewed- Renal function/electrolytes with Estimated Creatinine Clearance: 4.8 mL/min (A) (based on SCr of 8.8 mg/dL (H)). according to latest data. Monitor urine output and serial BMP and adjust therapy as needed. Avoid nephrotoxins and renally dose meds for GFR listed above.   Nephrology following and patient receives HD as indicated.     Gastric reflux  - PPI BID    High risk medications (not anticoagulants) long-term use  See history of vasculitis therapy    Anuria  - Improving urine output  - Nephrology following- re-evaluating daily for need for Intermittent HD; permacath placement on 12/8  - improving    Gastrointestinal hemorrhage with melena  Starting having hemoptysis and melena with associated acute blood loss anemia earlier in hospital stay. Patient with known internal hemorrhoids, mild sigmoid diverticulosis, history of gastritis and + H  pylori (2012 EGD).   - GI consulted 10/19, unable to perform EGD due to instability and respiratory failure at the time  - PPI PO BID   - Monitor CBC closely for signs of recurrent bleed  - EGD w/no acute bleed seen  - Transfused 2units pRBC    Dysphagia  SLP following  MBSS showing global weakness in swallowing as well as aspiration with thin liquids.   ENT consulted but patient did not tolerate laryngoscopy   - Dysphagia possibly due to previous stroke  - Briefly required NGT, worked with SLP  - NGT removed- being treated with nystatin swish and swallow for thrush  - GI consulted as well for concern of oropharyngeal candidiasis;  Low suspicion for esophageal candidiasis without odynophagia however can have if white plaques have been seen on oropharynx, ok to start fluconazole empirically for possible esophageal candidiasis  - EGD on 11/14 without abnormality; per GI, Suspect some aspect of esophageal dysmotility in setting of critical illness. No further testing at this time.  - Advanced to renal diet 11/22    Moderate malnutrition  Nutrition consulted. Most recent weight and BMI monitored-     Malnutrition (Moderate to Severe)  Weight Loss (Malnutrition): 7.5% in 3 months    Measurements:  Wt Readings from Last 1 Encounters:   12/07/22 66.3 kg (146 lb 2.6 oz)   Body mass index is 27.62 kg/m².    Recommendations: Recommendation/Intervention: 1.  Goals: Meet % EEN, EPN by RD f/u date    Patient has been screened and assessed by RD. RD will follow patient.      Acute renal failure on dialysis  Due to microscopic polyangiitis. Remains on hemodialysis intermittently.   - Baseline creatinine 1.0-1.2.   - New onset proteinuria/hematuria.   - Renal biopsy completed   - tunneled HD catheter in place 12/8 for intermittent Hemodialysis  - Nephrology following  - renally dose all medications  - intermittent Hemodialysis as indicated, per Nephrology: pursue outpatient HD; IR consulted for tunneled catheter placement;  case management to assist with HD chair.    Acute hypoxemic respiratory failure  Multifocal pneumonia  Hemoptysis  Dyspnea  Diffuse Alveolar Hemorrahge  In the setting of a new diagnosis of microscopic polyangiitis, presented with fevers, dyspnea,.  - Chest imaging was consistent with diffuse alveolar hemorrhage.  CT chest w/c 10/17 with JESSICA perihilar dense consolidations w/ peripheral patcy areas of GGO, JESSICA PNA, less c/f cardiogenic pulmonary edema.  - Patient upgraded to Medical ICU 10/23/22 with abrupt respiratory decline requiring NIPPV, improved with high dose IV steroids, plasmapheresis, emergent hemodialysis.   - Unable to perform bronchoscopy in the Medical ICU due to tenuous respiratory status  Hypoxia has resolved  - weaned to room air  - continue management of underlying triggers with steroid and immunosuppressants  - incentive spirometry and respiratory hygiene    Lymphadenopathy of head and neck  - Has bilateral neck gland swelling with tenderness; consulted ENT  - No surgical intervention indicated   - Adenopathy likely reactive in nature   - Recommend further workup if it does not resolve within next 2 weeks   - 11/30-- Adenopathy is still present especially right submandibular region  - ENT follow up as OP    Hyponatremia  - thought to be due to SIADH initially- received NaCl tabs  - NaCl tabs discontinued  - Na stable on HD    Other specified anemias  In the setting of GI bleed with melena  - Evaluated by GI, see GI bleed documentation  - Last transfusion 10/28, Hgb slowly trending down since then  - Hgb 6.9 on 11/5  - Monitor CBC   - Treat with pRBC transfusion PRN Hgb <7  - Transfused 3u pRBC    Chronic diastolic heart failure  Pulmonary Hypertension due to left heart disease  TTE (10/2022) EF 65%, G1DD  Home meds: Lisinopril, Toprol  - Active volume control with intermittent Hemodialysis per Nephrology   - Daily weights (standing if tolerated)  - Strict I/Os  - Fluid restriction 1.5 day  -  Cardiac diet 2 g Na restriction    Hyperkalemia  - resolved    Primary hypertension  - Patient was unable to tolerate PO mediations, all meds administered via NG tube until removed on 11/15.  - continue to monitor blood pressure trend closely and adjust antihypertensive regimen as clinically indicated and tolerated.  - amLODIPine, carvediloL, hydrALAZINE; doses decreased on 12/4 due to low BP with extreme fatigue and weakness especially on HD days  --continue to monitor and adjust regimen as necessary    Hypothyroid  - Continue Synthroid 25 mcg  - TSH 0.585 10/27/22        Active Hospital Problems    Diagnosis  POA    *Microscopic polyangiitis [M31.7]  Yes    Dizzy spells [R42]  Yes    Anemia due to chronic kidney disease [N18.9, D63.1]  Yes    Anuria [R34]  Yes    High risk medications (not anticoagulants) long-term use [Z79.899]  Not Applicable    Gastric reflux [K21.9]  Yes    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, N05.7]  Yes    Gastrointestinal hemorrhage with melena [K92.1]  No    Dysphagia [R13.10]  Yes    Moderate malnutrition [E44.0]  Yes    Acute renal failure on dialysis [N17.9, Z99.2]  Not Applicable    Acute hypoxemic respiratory failure [J96.01]  Yes    Lymphadenopathy of head and neck [R59.1]  Yes    Hyponatremia [E87.1]  Yes    Other specified anemias [D64.89]  Yes    Chronic diastolic heart failure [I50.32]  Yes    Hyperkalemia [E87.5]  Yes    Primary hypertension [I10]  Yes    Hypothyroid [E03.9]  Yes      Resolved Hospital Problems    Diagnosis Date Resolved POA    Hemoptysis [R04.2] 11/05/2022 Yes    Diffuse pulmonary alveolar hemorrhage [R04.89] 11/05/2022 Yes    Hypernatremia [E87.0] 11/05/2022 No    Dysuria [R30.0] 11/05/2022 No    Septic shock [A41.9, R65.21] 11/05/2022 Yes    Cervical adenopathy [R59.0] 11/05/2022 No    Acute renal failure superimposed on stage 3 chronic kidney disease [N17.9, N18.30] 11/05/2022 Yes    Sepsis due  to pneumonia [J18.9, A41.9] 10/27/2022 Yes    Multifocal pneumonia [J18.9] 11/05/2022 Yes    Pulmonary HTN [I27.20] 11/05/2022 Yes    CKD (chronic kidney disease), stage III [N18.30] 11/05/2022 Yes    Elevated CPK [R74.8] 10/27/2022 Yes     Chronic       Inpatient Medications Prescribed for Management of Current Problems:     Scheduled Meds:    amLODIPine  5 mg Oral Daily    atovaquone  1,500 mg Oral Daily    carvediloL  12.5 mg Oral BID    epoetin hira (PROCRIT) injection  50 Units/kg Subcutaneous Every Mon, Wed, Fri    hydrALAZINE  50 mg Oral Q8H    levothyroxine  25 mcg Oral Before breakfast    pantoprazole  40 mg Oral BID AC    predniSONE  15 mg Oral with lunch    Followed by    [START ON 12/18/2022] predniSONE  12.5 mg Oral with lunch    Followed by    [START ON 1/1/2023] predniSONE  10 mg Oral with lunch    [START ON 1/15/2023] predniSONE  5 mg Oral Daily    QUEtiapine  12.5 mg Oral QHS    senna-docusate 8.6-50 mg  1 tablet Oral BID    sodium chloride 0.9% flush bag IVPB   Intravenous 1 time in Clinic/HOD    white petrolatum   Topical (Top) Daily     Continuous Infusions:   As Needed: acetaminophen, albuterol-ipratropium, aluminum-magnesium hydroxide-simethicone, bisacodyL, cloNIDine, dextrose 10%, dextrose 10%, heparin (porcine), heparin (porcine), heparin (porcine), heparin, porcine (PF), heparin, porcine (PF), meclizine, melatonin, methocarbamoL, metoclopramide HCl, naloxone, ondansetron, prochlorperazine, simethicone, sodium chloride 0.9%, sodium chloride 0.9%, sodium chloride 0.9%, sodium chloride 0.9%, sodium chloride 0.9%, sodium chloride 0.9%, sucralfate    VTE Risk Mitigation (From admission, onward)         Ordered     heparin (porcine) injection 1,000 Units  As needed (PRN)         11/29/22 0857     heparin (porcine) injection 1,000 Units  As needed (PRN)         11/22/22 0832     heparin, porcine (PF) 100 unit/mL injection flush 500 Units  As needed (PRN)         11/17/22 0191      heparin, porcine (PF) 100 unit/mL injection flush 500 Units  As needed (PRN)         11/10/22 1430     heparin (porcine) injection 1,000 Units  As needed (PRN)         11/09/22 1601     heparin (porcine) injection 1,000 Units  As needed (PRN)         11/08/22 0854     Place sequential compression device  Until discontinued         10/25/22 1731     IP VTE HIGH RISK PATIENT  Once         10/17/22 1354     Reason for No Pharmacological VTE Prophylaxis  Once        Question:  Reasons:  Answer:  Risk of Bleeding    10/17/22 1354              I have completed this tele-visit without the assistance of a telepresenter.    The attending portion of this evaluation, treatment, and documentation was performed per Yaquelin Concepcion MD via Telemedicine [employer] using the secure [employer] software platform with 2 way audio/video. The provider was located off-site and the patient is located in the hospital. The aforementioned video software was utilized to document the relevant history and physical exam. Secure [employer] software platform was used instead of [employer] for this visit.      Yaquelin Concepcion MD  Department of Hospital Medicine   [facility] - Intensive Care ([place]

## 2022-12-10 NOTE — PLAN OF CARE
Patient chart checked with elevated blood pressures on moderate control and hyperkalemic this AM    Recommending increase in Coreg from 12.5 to 25 mg bid  Recommending 1 dose of lokelma for hyperkalemia, if continues to be hyperkalemic will iHD on 12/11. To be assessed tomorrow      Blue Puente MD  PGY-4 Nephrology

## 2022-12-10 NOTE — NURSING
Alert and oriented x 4. Speech Is clear. Up in chair most of day. Appetite fair. No sx of distress. Safety measures in place. Bed in low position. Call light in reach.

## 2022-12-10 NOTE — PLAN OF CARE
Problem: Adult Inpatient Plan of Care  Goal: Plan of Care Review  Outcome: Ongoing, Progressing  Goal: Patient-Specific Goal (Individualized)  Outcome: Ongoing, Progressing  Goal: Absence of Hospital-Acquired Illness or Injury  Outcome: Ongoing, Progressing  Goal: Optimal Comfort and Wellbeing  Outcome: Ongoing, Progressing  Goal: Readiness for Transition of Care  Outcome: Ongoing, Progressing     Problem: Infection  Goal: Absence of Infection Signs and Symptoms  Outcome: Ongoing, Progressing     Problem: Adjustment to Illness (Sepsis/Septic Shock)  Goal: Optimal Coping  Outcome: Ongoing, Progressing     Problem: Adjustment to Illness (Sepsis/Septic Shock)  Goal: Optimal Coping  Outcome: Ongoing, Progressing     Problem: Bleeding (Sepsis/Septic Shock)  Goal: Absence of Bleeding  Outcome: Ongoing, Progressing     Problem: Glycemic Control Impaired (Sepsis/Septic Shock)  Goal: Blood Glucose Level Within Desired Range  Outcome: Ongoing, Progressing

## 2022-12-10 NOTE — SUBJECTIVE & OBJECTIVE
Telemedicine  This service was provided by [facility].    Patient was transferred to [facility] on:  12/03/2022     Chief Complaint   Patient presents with    Multiple complaints     Seen 2 other times since sept, cont with now fever, cough with blood      The patient location is: [id number]   Admitted 10/17/2022 10:12 AM    Interval History / Events Overnight:   The patient is able to provide adequate history. Additional history was obtained from past medical records. No significant events reported by Nursing.   Patient complains of nothing specific. Symptoms have been unchanged since yesterday. Associated symptoms include: fatigue. Symptoms are stable.     Lab test(s) reviewed: H&H stable, hyperkalemia    Review of Systems   Constitutional:  Negative for fever.   Respiratory:  Negative for shortness of breath.      Objective:     Vital Signs (Most Recent):  Temp: 97.8 °F (36.6 °C) (12/10/22 1221)  Pulse: 70 (12/10/22 1255)  Resp: 18 (12/10/22 1221)  BP: 133/63 (12/10/22 1255)  SpO2: 98 % (12/10/22 1221)   Vital Signs (24h Range):  Temp:  [96.4 °F (35.8 °C)-98.2 °F (36.8 °C)] 97.8 °F (36.6 °C)  Pulse:  [69-75] 70  Resp:  [17-18] 18  SpO2:  [96 %-98 %] 98 %  BP: (117-165)/(55-72) 133/63     Weight: 66.3 kg (146 lb 2.6 oz)  Body mass index is 27.62 kg/m².    Intake/Output Summary (Last 24 hours) at 12/10/2022 1436  Last data filed at 12/10/2022 0500  Gross per 24 hour   Intake 240 ml   Output --   Net 240 ml        Physical Exam  Constitutional:       General: She is not in acute distress.     Appearance: Normal appearance.   Eyes:      General: Lids are normal. No scleral icterus.        Right eye: No discharge.         Left eye: No discharge.      Conjunctiva/sclera: Conjunctivae normal.   Neck:      Trachea: Phonation normal.   Cardiovascular:      Rate and Rhythm: Normal rate.      Comments: Monitor / Vital signs reviewed at time of visit  Pulmonary:      Effort: Pulmonary effort is normal.  No tachypnea, accessory muscle usage or respiratory distress.   Abdominal:      General: There is no distension.   Neurological:      Mental Status: She is alert. She is not disoriented.   Psychiatric:         Attention and Perception: Attention normal.         Mood and Affect: Affect normal.         Behavior: Behavior is cooperative.       Significant Labs:  Recent Labs   Lab 08/02/22  1156   HGBA1C 5.5       Recent Labs   Lab 12/08/22 0448 12/09/22  0352 12/10/22  0320   WBC 9.43 8.81 7.26   HGB 8.4* 7.8* 7.8*   HCT 26.4* 24.5* 25.4*   * 128* 130*       Recent Labs   Lab 12/08/22  0448 12/09/22  0352 12/10/22  0320   GRAN 68.6  6.5 65.8  5.8 69.1  5.0   LYMPH 21.3  2.0 24.3  2.1 23.3  1.7   MONO 8.2  0.8 8.3  0.7 5.4  0.4   EOS 0.0 0.0 0.0       Recent Labs   Lab 12/08/22 0448 12/08/22  0449 12/09/22  0352 12/10/22  0320     --  137 138   K 4.4  --  4.8 5.3*     --  102 103   CO2 23  --  21* 20*   BUN 69*  --  72* 79*   CREATININE 8.9*  --  8.8* 9.1*   GLU 82  --  74 86   CALCIUM 7.7*  --  7.5* 7.7*   MG  --  1.8 1.8 1.9   PHOS  --  4.9* 5.4* 5.3*       Recent Labs   Lab 12/07/22  0440   INR 1.1       Recent Labs   Lab 09/24/22  1120 10/14/22  1638 10/17/22  1200 10/23/22  1822 10/30/22  0426 11/25/22  0255   PROCAL 0.06 0.12  --   --   --   --    LACTATE 0.7  --  0.9 0.9  --   --    DDIMER  --  1.96*  --   --   --   --    FERRITIN  --   --   --   --  374* 588*       SARS-CoV2 (COVID-19) Qualitative PCR (no units)   Date Value   10/24/2022 Not Detected   02/14/2022 Not Detected   09/25/2020 Not Detected   05/21/2020 Not Detected     SARS-CoV-2 RNA, Amplification, Qual (no units)   Date Value   10/17/2022 Negative     POC Rapid COVID (no units)   Date Value   09/24/2022 Negative       ECG Results              EKG 12-lead (Final result)  Result time 10/17/22 14:26:15      Final result by Interface, Lab In [facility] (10/17/22 14:26:15)                   Narrative:    Test Reason :  R06.02,    Vent. Rate : 093 BPM     Atrial Rate : 093 BPM     P-R Int : 126 ms          QRS Dur : 070 ms      QT Int : 356 ms       P-R-T Axes : 048 052 030 degrees     QTc Int : 442 ms    Normal sinus rhythm  Normal ECG  When compared with ECG of 14-OCT-2022 14:26,  Limb lead reversal has been corrected  Confirmed by Jc Barbosa MD ([id number]) on 10/17/2022 2:26:04 PM    Referred By: [email or web address]   SELF           Confirmed By:Jc Barbosa MD                                    Results for orders placed during the hospital encounter of 10/17/22    Echo    Interpretation Summary  · The left ventricle is normal in size with normal systolic function. The estimated ejection fraction is 65%.  · Normal right ventricular size with normal right ventricular systolic function.  · Grade I left ventricular diastolic dysfunction.  · Mild tricuspid regurgitation.  · There is pulmonary hypertension. The estimated PA systolic pressure is 56 mmHg.  · Normal to low central venous pressure (3 mmHg).      MRI Brain Without Contrast  Narrative: EXAMINATION:  MRI BRAIN WITHOUT CONTRAST    CLINICAL HISTORY:  Dizziness, non-specific;    TECHNIQUE:  Multiplanar multisequence MR imaging of the brain was performed without contrast.    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  Periventricular and subcortical white matter changes are present likely related to chronic microvascular disease.  Small remote left medial occipital cortical infarct.  There is no evidence of recent or remote hemorrhage.  No extra-axial collections are identified.  No hydrocephalus.  Skull base structures are unremarkable.  Impression: Mild chronic ischemic changes are present, as above.    Electronically signed by: Timothy Bess MD  Date:    12/09/2022  Time:    08:32      Labs and Imaging within the last 24 hours listed above were reviewed.       Diet: [facility]; Lactose  Restricted  Significant LDAs:   IV Access Type: Peripheral and Dialysis Access  Urinary Catheter Indication if present: Patient Does Not Have Urinary Catheter  Other Lines/Tubes/Drains:    HIGH RISK CONDITION(S):   Patient has a condition that poses threat to life and bodily function: Acute Renal Failure     Goals of Care:    Previous admission:  10/14/22  Likely prognosis:  Fair  Code Status: Full Code  Comfort Only: No  Hospice: No  Goals at discharge: remain at home, with physician follow-up    Discharge Planning   ANTHONY: 12/14/2022     Code Status: Full Code   Is the patient medically ready for discharge?: Yes    Reason for patient still in hospital (select all that apply): Patient trending condition, Treatment, and Pending disposition  Discharge Plan A: Skilled Nursing Facility   Discharge Delays: None known at this time

## 2022-12-10 NOTE — ASSESSMENT & PLAN NOTE
- Patient was unable to tolerate PO mediations, all meds administered via NG tube until removed on 11/15.  - continue to monitor blood pressure trend closely and adjust antihypertensive regimen as clinically indicated and tolerated.  - amLODIPine, carvediloL, hydrALAZINE; doses decreased on 12/4 due to low BP with extreme fatigue and weakness especially on HD days  --continue to monitor and adjust regimen as necessary

## 2022-12-10 NOTE — ASSESSMENT & PLAN NOTE
Occurring intermittently during hospitalization but also when patient was younger  -associated with nausea, weakness; shifting eye movements present  -meclizine prn  -Neurology evaluation due to recent vasculitis diagnosis; MRI brain ordered 12/8 unremarkable  - Neurology recommends:   -- PT/OT for vestibular therapy   -- OP workup with cardiology and possible tilt table test   -- OP Neurology follow up for BPPV

## 2022-12-10 NOTE — ASSESSMENT & PLAN NOTE
As of 10/26/22 strongly positive MPO Ab (in contrast to borderline positive PR3), consistent with clinical picture of microscopic polyangiitis with pulmonary, and renal involvement after presenting with acute hypoxemic respiratory failure, hemoptysis, and acute renal failure.  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   3)  CHANGES SUGGESTIVE OF FOCAL ACUTE PYELONEPHRITIS.   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 Currently on oral steroid taper   - plan for 20mg IV daily on 11/6 for 14 days (last dose of 20mg equivalent 11/20/2022); completing oral Prednisone per PEXIVAS steroid taper  - apheresis: underwent PLEX 10/26, 10/27, 10/30, 11/1, 11/2, 11/3  - rituximab every 7 days for 4 doses: Dose #1: 10/27, #2 11/3, #3 11/10, and #4 given 11/17  - cyclophosphamide every 14 days for 2 doses: 10/27, 11/10  - Opportunistic Infection ppx: atovaquone 1500mg PO daily  - GI bleed prophylaxis: pantoprazole 40mg BID

## 2022-12-10 NOTE — ASSESSMENT & PLAN NOTE
Due to microscopic polyangiitis. Remains on hemodialysis intermittently.   - Baseline creatinine 1.0-1.2.   - New onset proteinuria/hematuria.   - Renal biopsy completed   - tunneled HD catheter in place 12/8 for intermittent Hemodialysis  - Nephrology following  - renally dose all medications  - intermittent Hemodialysis as indicated, per Nephrology: pursue outpatient HD; IR consulted for tunneled catheter placement; case management to assist with HD chair.

## 2022-12-11 PROBLEM — N30.00 ACUTE CYSTITIS WITHOUT HEMATURIA: Status: ACTIVE | Noted: 2022-12-11

## 2022-12-11 LAB
ANION GAP SERPL CALC-SCNC: 15 MMOL/L (ref 8–16)
BASOPHILS # BLD AUTO: 0.01 K/UL (ref 0–0.2)
BASOPHILS NFR BLD: 0.1 % (ref 0–1.9)
BUN SERPL-MCNC: 80 MG/DL (ref 8–23)
CALCIUM SERPL-MCNC: 7.7 MG/DL (ref 8.7–10.5)
CHLORIDE SERPL-SCNC: 105 MMOL/L (ref 95–110)
CO2 SERPL-SCNC: 20 MMOL/L (ref 23–29)
CREAT SERPL-MCNC: 9.7 MG/DL (ref 0.5–1.4)
DIFFERENTIAL METHOD: ABNORMAL
EOSINOPHIL # BLD AUTO: 0 K/UL (ref 0–0.5)
EOSINOPHIL NFR BLD: 0 % (ref 0–8)
ERYTHROCYTE [DISTWIDTH] IN BLOOD BY AUTOMATED COUNT: 14.8 % (ref 11.5–14.5)
EST. GFR  (NO RACE VARIABLE): 3.8 ML/MIN/1.73 M^2
GLUCOSE SERPL-MCNC: 78 MG/DL (ref 70–110)
HCT VFR BLD AUTO: 25.1 % (ref 37–48.5)
HGB BLD-MCNC: 7.5 G/DL (ref 12–16)
IMM GRANULOCYTES # BLD AUTO: 0.22 K/UL (ref 0–0.04)
IMM GRANULOCYTES NFR BLD AUTO: 2.5 % (ref 0–0.5)
LYMPHOCYTES # BLD AUTO: 2.2 K/UL (ref 1–4.8)
LYMPHOCYTES NFR BLD: 24.5 % (ref 18–48)
MAGNESIUM SERPL-MCNC: 1.9 MG/DL (ref 1.6–2.6)
MCH RBC QN AUTO: 27.7 PG (ref 27–31)
MCHC RBC AUTO-ENTMCNC: 29.9 G/DL (ref 32–36)
MCV RBC AUTO: 93 FL (ref 82–98)
MONOCYTES # BLD AUTO: 0.7 K/UL (ref 0.3–1)
MONOCYTES NFR BLD: 8.2 % (ref 4–15)
NEUTROPHILS # BLD AUTO: 5.8 K/UL (ref 1.8–7.7)
NEUTROPHILS NFR BLD: 64.7 % (ref 38–73)
NRBC BLD-RTO: 0 /100 WBC
PHOSPHATE SERPL-MCNC: 5.4 MG/DL (ref 2.7–4.5)
PLATELET # BLD AUTO: 144 K/UL (ref 150–450)
PMV BLD AUTO: 11.3 FL (ref 9.2–12.9)
POTASSIUM SERPL-SCNC: 4.7 MMOL/L (ref 3.5–5.1)
RBC # BLD AUTO: 2.71 M/UL (ref 4–5.4)
SODIUM SERPL-SCNC: 140 MMOL/L (ref 136–145)
WBC # BLD AUTO: 8.97 K/UL (ref 3.9–12.7)

## 2022-12-11 PROCEDURE — 84100 ASSAY OF PHOSPHORUS: CPT

## 2022-12-11 PROCEDURE — 25000003 PHARM REV CODE 250: Performed by: INTERNAL MEDICINE

## 2022-12-11 PROCEDURE — 99233 SBSQ HOSP IP/OBS HIGH 50: CPT | Mod: 95,,, | Performed by: INTERNAL MEDICINE

## 2022-12-11 PROCEDURE — 94761 N-INVAS EAR/PLS OXIMETRY MLT: CPT

## 2022-12-11 PROCEDURE — 85025 COMPLETE CBC W/AUTO DIFF WBC: CPT | Performed by: STUDENT IN AN ORGANIZED HEALTH CARE EDUCATION/TRAINING PROGRAM

## 2022-12-11 PROCEDURE — 25000003 PHARM REV CODE 250: Performed by: HOSPITALIST

## 2022-12-11 PROCEDURE — 20600001 HC STEP DOWN PRIVATE ROOM

## 2022-12-11 PROCEDURE — 36415 COLL VENOUS BLD VENIPUNCTURE: CPT

## 2022-12-11 PROCEDURE — 63600175 PHARM REV CODE 636 W HCPCS: Performed by: INTERNAL MEDICINE

## 2022-12-11 PROCEDURE — 80048 BASIC METABOLIC PNL TOTAL CA: CPT

## 2022-12-11 PROCEDURE — 83735 ASSAY OF MAGNESIUM: CPT

## 2022-12-11 PROCEDURE — 99233 PR SUBSEQUENT HOSPITAL CARE,LEVL III: ICD-10-PCS | Mod: 95,,, | Performed by: INTERNAL MEDICINE

## 2022-12-11 RX ORDER — AMOXICILLIN 250 MG
1 CAPSULE ORAL 2 TIMES DAILY PRN
Status: DISCONTINUED | OUTPATIENT
Start: 2022-12-11 | End: 2022-12-13 | Stop reason: HOSPADM

## 2022-12-11 RX ADMIN — ATOVAQUONE 1500 MG: 750 SUSPENSION ORAL at 01:12

## 2022-12-11 RX ADMIN — PREDNISONE 15 MG: 5 TABLET ORAL at 01:12

## 2022-12-11 RX ADMIN — PANTOPRAZOLE SODIUM 40 MG: 40 TABLET, DELAYED RELEASE ORAL at 06:12

## 2022-12-11 RX ADMIN — WHITE PETROLATUM: 1.75 OINTMENT TOPICAL at 09:12

## 2022-12-11 RX ADMIN — HYDRALAZINE HYDROCHLORIDE 50 MG: 50 TABLET ORAL at 11:12

## 2022-12-11 RX ADMIN — AMLODIPINE BESYLATE 5 MG: 5 TABLET ORAL at 09:12

## 2022-12-11 RX ADMIN — PANTOPRAZOLE SODIUM 40 MG: 40 TABLET, DELAYED RELEASE ORAL at 04:12

## 2022-12-11 RX ADMIN — QUETIAPINE FUMARATE 12.5 MG: 25 TABLET ORAL at 11:12

## 2022-12-11 RX ADMIN — HYDRALAZINE HYDROCHLORIDE 50 MG: 50 TABLET ORAL at 01:12

## 2022-12-11 RX ADMIN — CARVEDILOL 25 MG: 25 TABLET, FILM COATED ORAL at 11:12

## 2022-12-11 RX ADMIN — HYDRALAZINE HYDROCHLORIDE 50 MG: 50 TABLET ORAL at 05:12

## 2022-12-11 RX ADMIN — CARVEDILOL 25 MG: 25 TABLET, FILM COATED ORAL at 09:12

## 2022-12-11 RX ADMIN — LEVOTHYROXINE SODIUM 25 MCG: 25 TABLET ORAL at 05:12

## 2022-12-11 NOTE — ASSESSMENT & PLAN NOTE
Due to microscopic polyangiitis. Remains on hemodialysis intermittently.   - Baseline creatinine 1.0-1.2.   - New onset proteinuria/hematuria.   - Renal biopsy completed   - tunneled HD catheter in place 12/8 for intermittent Hemodialysis  - Nephrology following  - renally dose all medications  - intermittent Hemodialysis as indicated, per Nephrology: pursue outpatient HD; IR consulted for tunneled catheter placement; case management to assist with HD chair.  - will need HD center capable of intermittent HD while patient is at SNF

## 2022-12-11 NOTE — ASSESSMENT & PLAN NOTE
Patient with acute kidney injury likely due to microscopic polyangiitis.  WILFREDO is currently stable. Labs reviewed- Renal function/electrolytes with Estimated Creatinine Clearance: 4.7 mL/min (A) (based on SCr of 9.1 mg/dL (H)). according to latest data. Monitor urine output and serial BMP and adjust therapy as needed. Avoid nephrotoxins and renally dose meds for GFR listed above.   Nephrology following and patient receives HD as indicated.

## 2022-12-11 NOTE — ASSESSMENT & PLAN NOTE
In the setting of GI bleed with melena  - Evaluated by GI, see GI bleed documentation  - Last transfusion 10/28, Hgb slowly trending down since then  - Hgb 6.9 on 11/5  - Monitor CBC   - Treat with pRBC transfusion PRN Hgb <7  - Transfused 3 units pRBC

## 2022-12-11 NOTE — NURSING
Pt IV was pulled out as a result of a fall. Multiple attempt to place a new IV were made but unsuccessful.  Hector ROJAS notified.  Request made to anesthesia for new ultrasound IV placement. Anesthesia currently in a procedure. Anesthesia Resident notified, also busy in procedures and unable to come up at this time. Anesthesia resident will come when they have time. Pt currently has no scheduled IV meds. Will continue to monitor. Pt team is aware.

## 2022-12-11 NOTE — PLAN OF CARE
Problem: Adult Inpatient Plan of Care  Goal: Optimal Comfort and Wellbeing  12/11/2022 0129 by Christel Mead RN  Outcome: Ongoing, Progressing  12/11/2022 0128 by Christel Mead RN  Outcome: Ongoing, Progressing     Problem: Adult Inpatient Plan of Care  Goal: Readiness for Transition of Care  Outcome: Ongoing, Progressing     Problem: Infection  Goal: Absence of Infection Signs and Symptoms  Outcome: Ongoing, Progressing

## 2022-12-11 NOTE — PROGRESS NOTES
During shift change, oncoming nurse noticed pt's bathroom light ringing. Upon entering the room, pt was sitting on the floor in feces. Staff called for assistance and was helped by fellow nurses and tech. Staff asked pt if she fell and if she hit her head. Pt explained that she did not hit her head and that she fell trying to get on the toilet. Pt was assisted back to the toilet to finishing using the restroom, and staff got her cleaned up before safely transferring back to the bed. Pt did not sustain any injuries, family/md/house sup/charge all notified. Neuro checks done and wdl, vitals stable. Pt explained that she did hit the call light for assistance but had been struggling w/ loose stools, and tried to go to the restroom on her own when no one came.

## 2022-12-11 NOTE — PLAN OF CARE
Problem: Adult Inpatient Plan of Care  Goal: Plan of Care Review  Outcome: Ongoing, Progressing  Goal: Patient-Specific Goal (Individualized)  Outcome: Ongoing, Progressing  Goal: Optimal Comfort and Wellbeing  Outcome: Ongoing, Progressing  Goal: Readiness for Transition of Care  Outcome: Ongoing, Progressing     Problem: Infection  Goal: Absence of Infection Signs and Symptoms  Outcome: Ongoing, Progressing     Problem: Adjustment to Illness (Sepsis/Septic Shock)  Goal: Optimal Coping  Outcome: Ongoing, Progressing

## 2022-12-11 NOTE — PROGRESS NOTES
[facility] - Intensive Care ([place] [address])  [facility] Medicine  Telemedicine Progress Note    Patient Name: Kristin Goodman  MRN: [id number]  Patient Class: IP- Inpatient   Admission Date: 10/17/2022  Length of Stay: 54 days  Attending Physician: Yaquelin Concepcion MD  Primary Care Provider: Lori Hernandez MD    Subjective:     Principal Problem:Microscopic polyangiitis    HPI:  Kristin Goodman is a 77 y.o. female with a past medical history of HTN, hypothyroidism, HFpEF, and osteoarthritis of the arm who has presented to the ED for cough, SOB, and weakness. Daughter is present at the bedside. Patient presented to the ED on 9/24 with hemoptysis, CT and CXR showed high suspicion for multilobar pneumonia. Patient was discharged with a 10-day course of levofloxacin and an albuterol inhaler. Patient followed up with her PCP on 10/4 with slight improvement in symptoms and went back to work. Patient continued to have worsening symptoms and presented to the ED again on 10/14 for evaluation and no interventions were done. Patient endorses fevers over the last few days up to 102.4 F with progressive SOB. She endorses hemoptysis with moderate amount of blood, generalized weakness, productive cough, SOB, and loss of appetite. Denies chest pain, nausea, vomiting, abdominal pain, or urinary changes.    ED: hypertensive up to 219/93 and tachycardic up to 117. Oxygen saturation on 92% on RA, placed on 5L NC with sats >97%. CBC remarkable for leukocytosis of 16.03 and Hb 7.7. K 3.3. Cr 1.6, baseline ~1.0. . Troponin 0.061. COVID and flu negative. EKG NSR. CT chest with contrast pending at time of admission. Given home amlodipine and lisinopril. Given 500mL NS, IV azithromycin, cefepime and vanc.       Overview/Hospital Course:  HM Course:  Ms. Goodman was admitted to Hospital Medicine for management of multifocal pneumonia and acute hypoxemic respiratory failure after presenting to the ED with fevers, cough, hemoptysis, and  dyspnea. She required escalation to the MICU due to abrupt decline in her respiratory status, associated with hemoptysis and requiring NIPPV. CT chest showed bilateral patchy ground glass opacities. Labs additionally were notable for acute renal failure on CKD3. Pulmonology was consulted while under the care of [facility] and felt this picture was consistent with diffuse alveolar hemorrhage.     ICU Course:   Her workup revealed a strongly positive MPO Ab consistent with clinical picture of microscopic polyangiitis with pulmonary and renal involvement. She underwent Trialysis catheter placement 10/25/2022 in the Medical ICU. 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     Rheumatology was consulted, extensive workup revealed MITUL + 1:2560 homogenous, +dsDNA, normal complements, PR3 1.2 (slight +),  (+), cANCA + 1:80, pANCA neg, GBMAb neg, trace cryos. Due to concern for MPA, she was started on pulse dose IV methylprednisolone 1000mg for 3 days, and plasmapheresis under the guidance of Transfusion Medicine. Patient was placed on steroid taper and completed PLEX. She additionally received rituximab and cyclophosphamide. OI prophylaxis was provided with atovaquone due to a sulfa allergy.     Nephrology was consulted for evaluation of acute renal failure in the setting of MPA. She did require SLED in the ICU for renal clearance and remains on intermittent hemodialysis. She is expected to continue HD once discharged.     Her acute hypoxemic respiratory failure improved and she was weaned off of supplemental oxygen. She stepped down to [facility] 10/28.     HM Course:  She underwent MBBS for evaluation of dysphagia, which showed global delayed initiation of swallow  and aspiration with thin liquids. Due to concern for possible prior stroke, head imaging was completed and negative for acute finding. She was NPO with NG tube. ENT was consulted for evaluation of dysphagia and cervical adenopathy.  Patient did not tolerate laryngoscopy; unable to visualize vocal cords but given her lack of hoarseness, they did not suspect vocal fold paresis/paralysis. MRI recommended by rheum to fully rule out any potential neurological cause of the patient's dysphagia; MRI demonstrated remote left thalamic lacunar type infarct and punctate remote left cerebellar infarcts.  She continued to work with speech therapy.  EGD completed 11/14 without abnormalities.  Per GI, Suspect some aspect of esophageal dysmotility in setting of critical illness. No further testing at this time.  Per SLP, diet advanced on 11/15 and NG tube removed.    Her other chronic medical conditions including hypothyroidism, diastolic CHF, and hypertension were managed with her home medications, with dose adjustments as needed.     Patient was noted to have downtrending Hg 11/17- required 1u pRBC. Rheum continued to follow for further steroid dosing. Patient was started on 2g NaCl tab TID for SIADH. SLP recommended mechanical soft diet with thin liquids 11/17. Na normalized 11/18. Hg improved to 8.5 following 1u pRBC. Patient had some hyperkalemia- started on scheduled lokelma. Patient continued to have improved UOP; however, BUN: Cr ratio uptrended. Nephro decided to hang off on HD as patient was having 500-600mL/24hr. He was started on scheduled NaHCO3 as ewll. Patient underwent HD 11/23. Patient had drop in Hg once again- 6.5 on 11/25. Given 1u pRBC (2nd unit). Underwent HD 11/30. Nephrology following to re-evaluate daily for need for HD and permacath placement.       Telemedicine  This service was provided by [facility] (formerly [facility]).    Patient was transferred to [facility] on:  12/03/2022     Chief Complaint   Patient  presents with    Multiple complaints     Seen 2 other times since sept, cont with now fever, cough with blood      The patient location is: [id number]   Admitted 10/17/2022 10:12 AM    Interval History / Events Overnight:   The patient is able to provide adequate history. Additional history was obtained from past medical records. No significant events reported by Nursing.   Patient complains of nothing specific. Symptoms have been unchanged since yesterday. Associated symptoms include: fatigue. Symptoms are stable.     Lab test(s) reviewed: H&H stable, hyperkalemia    Review of Systems   Constitutional:  Negative for fever.   Respiratory:  Negative for shortness of breath.      Objective:     Vital Signs (Most Recent):  Temp: 97.8 °F (36.6 °C) (12/10/22 1221)  Pulse: 70 (12/10/22 1255)  Resp: 18 (12/10/22 1221)  BP: 133/63 (12/10/22 1255)  SpO2: 98 % (12/10/22 1221)   Vital Signs (24h Range):  Temp:  [96.4 °F (35.8 °C)-98.2 °F (36.8 °C)] 97.8 °F (36.6 °C)  Pulse:  [69-75] 70  Resp:  [17-18] 18  SpO2:  [96 %-98 %] 98 %  BP: (117-165)/(55-72) 133/63     Weight: 66.3 kg (146 lb 2.6 oz)  Body mass index is 27.62 kg/m².    Intake/Output Summary (Last 24 hours) at 12/10/2022 1436  Last data filed at 12/10/2022 0500  Gross per 24 hour   Intake 240 ml   Output --   Net 240 ml        Physical Exam  Constitutional:       General: She is not in acute distress.     Appearance: Normal appearance.   Eyes:      General: Lids are normal. No scleral icterus.        Right eye: No discharge.         Left eye: No discharge.      Conjunctiva/sclera: Conjunctivae normal.   Neck:      Trachea: Phonation normal.   Cardiovascular:      Rate and Rhythm: Normal rate.      Comments: Monitor / Vital signs reviewed at time of visit  Pulmonary:      Effort: Pulmonary effort is normal. No tachypnea, accessory muscle usage or respiratory distress.   Abdominal:      General: There is no distension.   Neurological:      Mental Status: She is  alert. She is not disoriented.   Psychiatric:         Attention and Perception: Attention normal.         Mood and Affect: Affect normal.         Behavior: Behavior is cooperative.       Significant Labs:  Recent Labs   Lab 08/02/22  1156   HGBA1C 5.5       Recent Labs   Lab 12/08/22  0448 12/09/22  0352 12/10/22  0320   WBC 9.43 8.81 7.26   HGB 8.4* 7.8* 7.8*   HCT 26.4* 24.5* 25.4*   * 128* 130*       Recent Labs   Lab 12/08/22  0448 12/09/22  0352 12/10/22  0320   GRAN 68.6  6.5 65.8  5.8 69.1  5.0   LYMPH 21.3  2.0 24.3  2.1 23.3  1.7   MONO 8.2  0.8 8.3  0.7 5.4  0.4   EOS 0.0 0.0 0.0       Recent Labs   Lab 12/08/22  0448 12/08/22  0449 12/09/22  0352 12/10/22  0320     --  137 138   K 4.4  --  4.8 5.3*     --  102 103   CO2 23  --  21* 20*   BUN 69*  --  72* 79*   CREATININE 8.9*  --  8.8* 9.1*   GLU 82  --  74 86   CALCIUM 7.7*  --  7.5* 7.7*   MG  --  1.8 1.8 1.9   PHOS  --  4.9* 5.4* 5.3*       Recent Labs   Lab 12/07/22  0440   INR 1.1       Recent Labs   Lab 09/24/22  1120 10/14/22  1638 10/17/22  1200 10/23/22  1822 10/30/22  0426 11/25/22  0255   PROCAL 0.06 0.12  --   --   --   --    LACTATE 0.7  --  0.9 0.9  --   --    DDIMER  --  1.96*  --   --   --   --    FERRITIN  --   --   --   --  374* 588*       SARS-CoV2 (COVID-19) Qualitative PCR (no units)   Date Value   10/24/2022 Not Detected   02/14/2022 Not Detected   09/25/2020 Not Detected   05/21/2020 Not Detected     SARS-CoV-2 RNA, Amplification, Qual (no units)   Date Value   10/17/2022 Negative     POC Rapid COVID (no units)   Date Value   09/24/2022 Negative       ECG Results              EKG 12-lead (Final result)  Result time 10/17/22 14:26:15      Final result by Interface, Lab In [facility] (10/17/22 14:26:15)                   Narrative:    Test Reason : R06.02,    Vent. Rate : 093 BPM     Atrial Rate : 093 BPM     P-R Int : 126 ms          QRS Dur : 070 ms      QT Int : 356 ms       P-R-T Axes : 048 052 030  degrees     QTc Int : 442 ms    Normal sinus rhythm  Normal ECG  When compared with ECG of 14-OCT-2022 14:26,  Limb lead reversal has been corrected  Confirmed by Jc Barbosa MD ([id number]) on 10/17/2022 2:26:04 PM    Referred By: SAYRA   SELF           Confirmed By:Jc Barbosa MD                                    Results for orders placed during the hospital encounter of 10/17/22    Echo    Interpretation Summary  · The left ventricle is normal in size with normal systolic function. The estimated ejection fraction is 65%.  · Normal right ventricular size with normal right ventricular systolic function.  · Grade I left ventricular diastolic dysfunction.  · Mild tricuspid regurgitation.  · There is pulmonary hypertension. The estimated PA systolic pressure is 56 mmHg.  · Normal to low central venous pressure (3 mmHg).      MRI Brain Without Contrast  Narrative: EXAMINATION:  MRI BRAIN WITHOUT CONTRAST    CLINICAL HISTORY:  Dizziness, non-specific;    TECHNIQUE:  Multiplanar multisequence MR imaging of the brain was performed without contrast.    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  Periventricular and subcortical white matter changes are present likely related to chronic microvascular disease.  Small remote left medial occipital cortical infarct.  There is no evidence of recent or remote hemorrhage.  No extra-axial collections are identified.  No hydrocephalus.  Skull base structures are unremarkable.  Impression: Mild chronic ischemic changes are present, as above.    Electronically signed by: Timothy Bess MD  Date:    12/09/2022  Time:    08:32      Labs and Imaging within the last 24 hours listed above were reviewed.       Diet: Diet renal [facility]; Lactose Restricted  Significant LDAs:   IV Access Type: Peripheral and Dialysis Access  Urinary Catheter Indication if present: Patient Does Not Have Urinary Catheter  Other  Lines/Tubes/Drains:    HIGH RISK CONDITION(S):   Patient has a condition that poses threat to life and bodily function: Acute Renal Failure     Goals of Care:    Previous admission:  10/14/22  Likely prognosis:  Fair  Code Status: Full Code  Comfort Only: No  Hospice: No  Goals at discharge: remain at home, with physician follow-up    Discharge Planning   ANTHONY: 12/14/2022     Code Status: Full Code   Is the patient medically ready for discharge?: Yes    Reason for patient still in hospital (select all that apply): Patient trending condition, Treatment, and Pending disposition  Discharge Plan A: Skilled Nursing Facility   Discharge Delays: None known at this time      Assessment/Plan:      * Microscopic polyangiitis  As of 10/26/22 strongly positive MPO Ab (in contrast to borderline positive PR3), consistent with clinical picture of microscopic polyangiitis with pulmonary, and renal involvement after presenting with acute hypoxemic respiratory failure, hemoptysis, and acute renal failure.  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   3)  CHANGES SUGGESTIVE OF FOCAL ACUTE PYELONEPHRITIS.   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 Currently on oral steroid taper   - plan for 20mg IV daily on 11/6 for 14 days (last dose of 20mg equivalent 11/20/2022); completing  oral Prednisone per PEXIVAS steroid taper  - apheresis: underwent PLEX 10/26, 10/27, 10/30, 11/1, 11/2, 11/3  - rituximab every 7 days for 4 doses: Dose #1: 10/27, #2 11/3, #3 11/10, and #4 given 11/17  - cyclophosphamide every 14 days for 2 doses: 10/27, 11/10  - Opportunistic Infection ppx: atovaquone 1500mg PO daily  - GI bleed prophylaxis: pantoprazole 40mg BID    Dizzy spells  Occurring intermittently during hospitalization but also when patient was younger  -associated with nausea, weakness; shifting eye movements present  -meclizine prn  -Neurology evaluation due to recent vasculitis diagnosis; MRI brain ordered 12/8 unremarkable  - Neurology recommends:   -- PT/OT for vestibular therapy   -- OP workup with cardiology and possible tilt table test   -- OP Neurology follow up for BPPV     Anemia due to chronic kidney disease  - due to CKD  - Hg stable  - Monitor trend     Primary pauci-immune necrotizing and crescentic glomerulonephritis  Patient with acute kidney injury likely due to microscopic polyangiitis.  WILFREDO is currently stable. Labs reviewed- Renal function/electrolytes with Estimated Creatinine Clearance: 4.7 mL/min (A) (based on SCr of 9.1 mg/dL (H)). according to latest data. Monitor urine output and serial BMP and adjust therapy as needed. Avoid nephrotoxins and renally dose meds for GFR listed above.   Nephrology following and patient receives HD as indicated.     Gastric reflux  - PPI BID    High risk medications (not anticoagulants) long-term use  See history of vasculitis therapy    Anuria  - Improving urine output  - Nephrology following- re-evaluating daily for need for Intermittent HD; permacath placement on 12/8  - improving    Gastrointestinal hemorrhage with melena  Starting having hemoptysis and melena with associated acute blood loss anemia earlier in hospital stay. Patient with known internal hemorrhoids, mild sigmoid diverticulosis, history of gastritis and + H pylori (2012 EGD).   -  GI consulted 10/19, unable to perform EGD due to instability and respiratory failure at the time  - PPI PO BID   - Monitor CBC closely for signs of recurrent bleed  - EGD w/no acute bleed seen  - Transfused 2units pRBC    Dysphagia  SLP following  MBSS showing global weakness in swallowing as well as aspiration with thin liquids.   ENT consulted but patient did not tolerate laryngoscopy   - Dysphagia possibly due to previous stroke  - Briefly required NGT, worked with SLP  - NGT removed- being treated with nystatin swish and swallow for thrush  - GI consulted as well for concern of oropharyngeal candidiasis;  Low suspicion for esophageal candidiasis without odynophagia however can have if white plaques have been seen on oropharynx, ok to start fluconazole empirically for possible esophageal candidiasis  - EGD on 11/14 without abnormality; per GI, Suspect some aspect of esophageal dysmotility in setting of critical illness. No further testing at this time.  - Advanced to renal diet 11/22    Moderate malnutrition  Nutrition consulted. Most recent weight and BMI monitored-     Malnutrition (Moderate to Severe)  Weight Loss (Malnutrition): 7.5% in 3 months    Measurements:  Wt Readings from Last 1 Encounters:   12/07/22 66.3 kg (146 lb 2.6 oz)   Body mass index is 27.62 kg/m².    Recommendations: Recommendation/Intervention: 1.  Goals: Meet % EEN, EPN by RD f/u date    Patient has been screened and assessed by RD. RD will follow patient.      Acute renal failure on dialysis  Due to microscopic polyangiitis. Remains on hemodialysis intermittently.   - Baseline creatinine 1.0-1.2.   - New onset proteinuria/hematuria.   - Renal biopsy completed   - tunneled HD catheter in place 12/8 for intermittent Hemodialysis  - Nephrology following  - renally dose all medications  - intermittent Hemodialysis as indicated, per Nephrology: pursue outpatient HD; IR consulted for tunneled catheter placement; case management to assist  with HD chair.  - will need HD center capable of intermittent HD while patient is at SNF    Acute hypoxemic respiratory failure  Multifocal pneumonia  Hemoptysis  Dyspnea  Diffuse Alveolar Hemorrahge  In the setting of a new diagnosis of microscopic polyangiitis, presented with fevers, dyspnea,.  - Chest imaging was consistent with diffuse alveolar hemorrhage.  CT chest w/c 10/17 with JESSICA perihilar dense consolidations w/ peripheral patcy areas of GGO, JESSICA PNA, less c/f cardiogenic pulmonary edema.  - Patient upgraded to Medical ICU 10/23/22 with abrupt respiratory decline requiring NIPPV, improved with high dose IV steroids, plasmapheresis, emergent hemodialysis.   - Unable to perform bronchoscopy in the Medical ICU due to tenuous respiratory status  Hypoxia has resolved  - weaned to room air  - continue management of underlying triggers with steroid and immunosuppressants  - incentive spirometry and respiratory hygiene  - improved    Lymphadenopathy of head and neck  - Has bilateral neck gland swelling with tenderness; consulted ENT  - No surgical intervention indicated   - Adenopathy likely reactive in nature   - Recommend further workup if it does not resolve within next 2 weeks   - 11/30-- Adenopathy is still present especially right submandibular region  - ENT follow up as OP    Hyponatremia  - thought to be due to SIADH initially- received NaCl tabs  - NaCl tabs discontinued  - Na stable on HD    Other specified anemias  In the setting of GI bleed with melena  - Evaluated by GI, see GI bleed documentation  - Last transfusion 10/28, Hgb slowly trending down since then  - Hgb 6.9 on 11/5  - Monitor CBC   - Treat with pRBC transfusion PRN Hgb <7  - Transfused 3 units pRBC    Chronic diastolic heart failure  Pulmonary Hypertension due to left heart disease  TTE (10/2022) EF 65%, G1DD  Home meds: Lisinopril, Toprol  - Active volume control with intermittent Hemodialysis per Nephrology   - Daily weights (standing  if tolerated)  - Strict I/Os  - Fluid restriction 1.5 day  - Cardiac diet 2 g Na restriction    Hyperkalemia  - trial of Lokelma 12/10; HD if persists.    Primary hypertension  - Patient was unable to tolerate PO mediations, all meds administered via NG tube until removed on 11/15.  - continue to monitor blood pressure trend closely and adjust antihypertensive regimen as clinically indicated and tolerated.  - amLODIPine, carvediloL, hydrALAZINE; doses decreased on 12/4 due to low BP with extreme fatigue and weakness especially on HD days  --continue to monitor and adjust regimen as necessary    Hypothyroid  - Continue Synthroid 25 mcg  - TSH 0.585 10/27/22        Active Hospital Problems    Diagnosis  POA    *Microscopic polyangiitis [M31.7]  Yes    Dizzy spells [R42]  Yes    Anemia due to chronic kidney disease [N18.9, D63.1]  Yes    Anuria [R34]  Yes    High risk medications (not anticoagulants) long-term use [Z79.899]  Not Applicable    Gastric reflux [K21.9]  Yes    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, N05.7]  Yes    Gastrointestinal hemorrhage with melena [K92.1]  No    Dysphagia [R13.10]  Yes    Moderate malnutrition [E44.0]  Yes    Acute renal failure on dialysis [N17.9, Z99.2]  Not Applicable    Acute hypoxemic respiratory failure [J96.01]  Yes    Lymphadenopathy of head and neck [R59.1]  Yes    Hyponatremia [E87.1]  Yes    Other specified anemias [D64.89]  Yes    Chronic diastolic heart failure [I50.32]  Yes    Hyperkalemia [E87.5]  Yes    Primary hypertension [I10]  Yes    Hypothyroid [E03.9]  Yes      Resolved Hospital Problems    Diagnosis Date Resolved POA    Hemoptysis [R04.2] 11/05/2022 Yes    Diffuse pulmonary alveolar hemorrhage [R04.89] 11/05/2022 Yes    Hypernatremia [E87.0] 11/05/2022 No    Dysuria [R30.0] 11/05/2022 No    Septic shock [A41.9, R65.21] 11/05/2022 Yes    Cervical adenopathy [R59.0] 11/05/2022 No    Acute renal failure  superimposed on stage 3 chronic kidney disease [N17.9, N18.30] 11/05/2022 Yes    Sepsis due to pneumonia [J18.9, A41.9] 10/27/2022 Yes    Multifocal pneumonia [J18.9] 11/05/2022 Yes    Pulmonary HTN [I27.20] 11/05/2022 Yes    CKD (chronic kidney disease), stage III [N18.30] 11/05/2022 Yes    Elevated CPK [R74.8] 10/27/2022 Yes     Chronic       Inpatient Medications Prescribed for Management of Current Problems:     Scheduled Meds:    amLODIPine  5 mg Oral Daily    atovaquone  1,500 mg Oral Daily    carvediloL  25 mg Oral BID    epoetin hira (PROCRIT) injection  50 Units/kg Subcutaneous Every Mon, Wed, Fri    hydrALAZINE  50 mg Oral Q8H    levothyroxine  25 mcg Oral Before breakfast    pantoprazole  40 mg Oral BID AC    predniSONE  15 mg Oral with lunch    Followed by    [START ON 12/18/2022] predniSONE  12.5 mg Oral with lunch    Followed by    [START ON 1/1/2023] predniSONE  10 mg Oral with lunch    [START ON 1/15/2023] predniSONE  5 mg Oral Daily    QUEtiapine  12.5 mg Oral QHS    senna-docusate 8.6-50 mg  1 tablet Oral BID    sodium chloride 0.9% flush bag IVPB   Intravenous 1 time in Clinic/HOD    white petrolatum   Topical (Top) Daily     Continuous Infusions:   As Needed: acetaminophen, albuterol-ipratropium, aluminum-magnesium hydroxide-simethicone, bisacodyL, cloNIDine, dextrose 10%, dextrose 10%, heparin (porcine), heparin (porcine), heparin (porcine), heparin, porcine (PF), heparin, porcine (PF), meclizine, melatonin, methocarbamoL, metoclopramide HCl, naloxone, ondansetron, prochlorperazine, simethicone, sodium chloride 0.9%, sodium chloride 0.9%, sodium chloride 0.9%, sodium chloride 0.9%, sodium chloride 0.9%, sodium chloride 0.9%, sucralfate    VTE Risk Mitigation (From admission, onward)         Ordered     heparin (porcine) injection 1,000 Units  As needed (PRN)         11/29/22 0857     heparin (porcine) injection 1,000 Units  As needed (PRN)         11/22/22 0407     heparin,  porcine (PF) 100 unit/mL injection flush 500 Units  As needed (PRN)         11/17/22 1343     heparin, porcine (PF) 100 unit/mL injection flush 500 Units  As needed (PRN)         11/10/22 1430     heparin (porcine) injection 1,000 Units  As needed (PRN)         11/09/22 1601     heparin (porcine) injection 1,000 Units  As needed (PRN)         11/08/22 0854     Place sequential compression device  Until discontinued         10/25/22 1731     IP VTE HIGH RISK PATIENT  Once         10/17/22 1354     Reason for No Pharmacological VTE Prophylaxis  Once        Question:  Reasons:  Answer:  Risk of Bleeding    10/17/22 1354              I have completed this tele-visit without the assistance of a telepresenter.    The attending portion of this evaluation, treatment, and documentation was performed per Yaquelin Concepcion MD via Telemedicine AudioVisual using the secure Vidyo software platform with 2 way audio/video. The provider was located off-site and the patient is located in the hospital. The aforementioned video software was utilized to document the relevant history and physical exam. Secure eCareManager software platform was used instead of Staaff for this visit.      Yaquelin Concepcion MD  Department of Hospital Medicine   [facility] - Intensive Care ([place]

## 2022-12-11 NOTE — ASSESSMENT & PLAN NOTE
Multifocal pneumonia  Hemoptysis  Dyspnea  Diffuse Alveolar Hemorrahge  In the setting of a new diagnosis of microscopic polyangiitis, presented with fevers, dyspnea,.  - Chest imaging was consistent with diffuse alveolar hemorrhage.  CT chest w/c 10/17 with JESSICA perihilar dense consolidations w/ peripheral patcy areas of GGO, JESSICA PNA, less c/f cardiogenic pulmonary edema.  - Patient upgraded to Medical ICU 10/23/22 with abrupt respiratory decline requiring NIPPV, improved with high dose IV steroids, plasmapheresis, emergent hemodialysis.   - Unable to perform bronchoscopy in the Medical ICU due to tenuous respiratory status  Hypoxia has resolved  - weaned to room air  - continue management of underlying triggers with steroid and immunosuppressants  - incentive spirometry and respiratory hygiene  - improved

## 2022-12-11 NOTE — SUBJECTIVE & OBJECTIVE
Telemedicine  This service was provided by [facility].    Patient was transferred to [facility] on:  12/03/2022     Chief Complaint   Patient presents with    Multiple complaints     Seen 2 other times since sept, cont with now fever, cough with blood      The patient location is: [id number]   Admitted 10/17/2022 10:12 AM    Interval History / Events Overnight:   The patient is able to provide adequate history. Additional history was obtained from past medical records. On Call Provider contacted about a fall overnight without injury    Patient complains of nothing specific. Symptoms have been unchanged since yesterday. Associated symptoms include: fatigue. Symptoms are stable.     Lab test(s) reviewed: H&H stable    Review of Systems   Constitutional:  Negative for fever.   Respiratory:  Negative for shortness of breath.      Objective:     Vital Signs (Most Recent):  Temp: 98.1 °F (36.7 °C) (12/11/22 1135)  Pulse: 71 (12/11/22 1135)  Resp: 18 (12/11/22 1135)  BP: (!) 155/67 (12/11/22 1135)  SpO2: 95 % (12/11/22 1135)   Vital Signs (24h Range):  Temp:  [96.4 °F (35.8 °C)-98.2 °F (36.8 °C)] 98.1 °F (36.7 °C)  Pulse:  [67-87] 71  Resp:  [12-18] 18  SpO2:  [94 %-97 %] 95 %  BP: (132-166)/(63-77) 155/67     Weight: 66.3 kg (146 lb 2.6 oz)  Body mass index is 27.62 kg/m².    Intake/Output Summary (Last 24 hours) at 12/11/2022 1354  Last data filed at 12/11/2022 0500  Gross per 24 hour   Intake 580 ml   Output 1 ml   Net 579 ml        Physical Exam  Constitutional:       General: She is not in acute distress.     Appearance: Normal appearance.   Eyes:      General: Lids are normal. No scleral icterus.        Right eye: No discharge.         Left eye: No discharge.      Conjunctiva/sclera: Conjunctivae normal.   Neck:      Trachea: Phonation normal.   Cardiovascular:      Rate and Rhythm: Normal rate.      Comments: Monitor / Vital signs reviewed at time of visit  Pulmonary:      Effort: Pulmonary  effort is normal. No tachypnea, accessory muscle usage or respiratory distress.   Abdominal:      General: There is no distension.   Neurological:      Mental Status: She is alert. She is not disoriented.   Psychiatric:         Attention and Perception: Attention normal.         Mood and Affect: Affect normal.         Behavior: Behavior is cooperative.       Significant Labs:  Recent Labs   Lab 08/02/22  1156   HGBA1C 5.5       Recent Labs   Lab 12/09/22  0352 12/10/22  0320 12/11/22  0227   WBC 8.81 7.26 8.97   HGB 7.8* 7.8* 7.5*   HCT 24.5* 25.4* 25.1*   * 130* 144*       Recent Labs   Lab 12/09/22  0352 12/10/22  0320 12/11/22  0227   GRAN 65.8  5.8 69.1  5.0 64.7  5.8   LYMPH 24.3  2.1 23.3  1.7 24.5  2.2   MONO 8.3  0.7 5.4  0.4 8.2  0.7   EOS 0.0 0.0 0.0       Recent Labs   Lab 12/09/22  0352 12/10/22  0320 12/11/22  0227    138 140   K 4.8 5.3* 4.7    103 105   CO2 21* 20* 20*   BUN 72* 79* 80*   CREATININE 8.8* 9.1* 9.7*   GLU 74 86 78   CALCIUM 7.5* 7.7* 7.7*   MG 1.8 1.9 1.9   PHOS 5.4* 5.3* 5.4*       Recent Labs   Lab 12/07/22  0440   INR 1.1       Recent Labs   Lab 09/24/22  1120 10/14/22  1638 10/17/22  1200 10/23/22  1822 10/30/22  0426 11/25/22  0255   PROCAL 0.06 0.12  --   --   --   --    LACTATE 0.7  --  0.9 0.9  --   --    DDIMER  --  1.96*  --   --   --   --    FERRITIN  --   --   --   --  374* 588*       SARS-CoV2 (COVID-19) Qualitative PCR (no units)   Date Value   10/24/2022 Not Detected   02/14/2022 Not Detected   09/25/2020 Not Detected   05/21/2020 Not Detected     SARS-CoV-2 RNA, Amplification, Qual (no units)   Date Value   10/17/2022 Negative     POC Rapid COVID (no units)   Date Value   09/24/2022 Negative       ECG Results              EKG 12-lead (Final result)  Result time 10/17/22 14:26:15      Final result by Interface, Lab In [facility] (10/17/22 14:26:15)                   Narrative:    Test Reason : R06.02,    Vent. Rate : 093 BPM     Atrial Rate : 093  BPM     P-R Int : 126 ms          QRS Dur : 070 ms      QT Int : 356 ms       P-R-T Axes : 048 052 030 degrees     QTc Int : 442 ms    Normal sinus rhythm  Normal ECG  When compared with ECG of 14-OCT-2022 14:26,  Limb lead reversal has been corrected  Confirmed by Jc Barbosa MD ([id number]) on 10/17/2022 2:26:04 PM    Referred By: [email or web address]   SELF           Confirmed By:Jc Barbosa MD                                    Results for orders placed during the hospital encounter of 10/17/22    Echo    Interpretation Summary  · The left ventricle is normal in size with normal systolic function. The estimated ejection fraction is 65%.  · Normal right ventricular size with normal right ventricular systolic function.  · Grade I left ventricular diastolic dysfunction.  · Mild tricuspid regurgitation.  · There is pulmonary hypertension. The estimated PA systolic pressure is 56 mmHg.  · Normal to low central venous pressure (3 mmHg).      MRI Brain Without Contrast  Narrative: EXAMINATION:  MRI BRAIN WITHOUT CONTRAST    CLINICAL HISTORY:  Dizziness, non-specific;    TECHNIQUE:  Multiplanar multisequence MR imaging of the brain was performed without contrast.    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  Periventricular and subcortical white matter changes are present likely related to chronic microvascular disease.  Small remote left medial occipital cortical infarct.  There is no evidence of recent or remote hemorrhage.  No extra-axial collections are identified.  No hydrocephalus.  Skull base structures are unremarkable.  Impression: Mild chronic ischemic changes are present, as above.    Electronically signed by: Timothy Bess MD  Date:    12/09/2022  Time:    08:32      Labs and Imaging within the last 24 hours listed above were reviewed.       Diet: Diet renal [facility]; Lactose Restricted  Significant LDAs:   IV Access Type: Peripheral and  Dialysis Access  Urinary Catheter Indication if present: Patient Does Not Have Urinary Catheter  Other Lines/Tubes/Drains:    HIGH RISK CONDITION(S):   Patient has a condition that poses threat to life and bodily function: Acute Renal Failure     Goals of Care:    Previous admission:  10/14/22  Likely prognosis:  Fair  Code Status: Full Code  Comfort Only: No  Hospice: No  Goals at discharge: remain at home, with physician follow-up    Discharge Planning   ANTHONY: 12/14/2022     Code Status: Full Code   Is the patient medically ready for discharge?: Yes    Reason for patient still in hospital (select all that apply): Patient trending condition, Treatment, and Pending disposition  Discharge Plan A: Skilled Nursing Facility   Discharge Delays: None known at this time

## 2022-12-12 LAB
ANION GAP SERPL CALC-SCNC: 14 MMOL/L (ref 8–16)
BACTERIA UR CULT: ABNORMAL
BASOPHILS # BLD AUTO: 0.01 K/UL (ref 0–0.2)
BASOPHILS NFR BLD: 0.1 % (ref 0–1.9)
BUN SERPL-MCNC: 83 MG/DL (ref 8–23)
CALCIUM SERPL-MCNC: 8 MG/DL (ref 8.7–10.5)
CHLORIDE SERPL-SCNC: 103 MMOL/L (ref 95–110)
CO2 SERPL-SCNC: 21 MMOL/L (ref 23–29)
CREAT SERPL-MCNC: 9.6 MG/DL (ref 0.5–1.4)
DIFFERENTIAL METHOD: ABNORMAL
EOSINOPHIL # BLD AUTO: 0 K/UL (ref 0–0.5)
EOSINOPHIL NFR BLD: 0 % (ref 0–8)
ERYTHROCYTE [DISTWIDTH] IN BLOOD BY AUTOMATED COUNT: 14.9 % (ref 11.5–14.5)
EST. GFR  (NO RACE VARIABLE): 3.9 ML/MIN/1.73 M^2
GLUCOSE SERPL-MCNC: 78 MG/DL (ref 70–110)
HCT VFR BLD AUTO: 25.3 % (ref 37–48.5)
HGB BLD-MCNC: 8 G/DL (ref 12–16)
IMM GRANULOCYTES # BLD AUTO: 0.27 K/UL (ref 0–0.04)
IMM GRANULOCYTES NFR BLD AUTO: 3.1 % (ref 0–0.5)
LYMPHOCYTES # BLD AUTO: 2.5 K/UL (ref 1–4.8)
LYMPHOCYTES NFR BLD: 28.7 % (ref 18–48)
MAGNESIUM SERPL-MCNC: 1.9 MG/DL (ref 1.6–2.6)
MCH RBC QN AUTO: 28.7 PG (ref 27–31)
MCHC RBC AUTO-ENTMCNC: 31.6 G/DL (ref 32–36)
MCV RBC AUTO: 91 FL (ref 82–98)
MONOCYTES # BLD AUTO: 0.6 K/UL (ref 0.3–1)
MONOCYTES NFR BLD: 7.2 % (ref 4–15)
NEUTROPHILS # BLD AUTO: 5.4 K/UL (ref 1.8–7.7)
NEUTROPHILS NFR BLD: 60.9 % (ref 38–73)
NRBC BLD-RTO: 0 /100 WBC
PHOSPHATE SERPL-MCNC: 5.2 MG/DL (ref 2.7–4.5)
PLATELET # BLD AUTO: 167 K/UL (ref 150–450)
PMV BLD AUTO: 11.3 FL (ref 9.2–12.9)
POTASSIUM SERPL-SCNC: 4.6 MMOL/L (ref 3.5–5.1)
RBC # BLD AUTO: 2.79 M/UL (ref 4–5.4)
SODIUM SERPL-SCNC: 138 MMOL/L (ref 136–145)
WBC # BLD AUTO: 8.85 K/UL (ref 3.9–12.7)

## 2022-12-12 PROCEDURE — 85025 COMPLETE CBC W/AUTO DIFF WBC: CPT | Performed by: STUDENT IN AN ORGANIZED HEALTH CARE EDUCATION/TRAINING PROGRAM

## 2022-12-12 PROCEDURE — 99231 SBSQ HOSP IP/OBS SF/LOW 25: CPT | Mod: 95,,, | Performed by: INTERNAL MEDICINE

## 2022-12-12 PROCEDURE — 93005 ELECTROCARDIOGRAM TRACING: CPT

## 2022-12-12 PROCEDURE — 90935 HEMODIALYSIS ONE EVALUATION: CPT

## 2022-12-12 PROCEDURE — 25000003 PHARM REV CODE 250: Performed by: INTERNAL MEDICINE

## 2022-12-12 PROCEDURE — 99233 PR SUBSEQUENT HOSPITAL CARE,LEVL III: ICD-10-PCS | Mod: ,,, | Performed by: INTERNAL MEDICINE

## 2022-12-12 PROCEDURE — 80048 BASIC METABOLIC PNL TOTAL CA: CPT | Performed by: STUDENT IN AN ORGANIZED HEALTH CARE EDUCATION/TRAINING PROGRAM

## 2022-12-12 PROCEDURE — 63600175 PHARM REV CODE 636 W HCPCS: Mod: JG | Performed by: INTERNAL MEDICINE

## 2022-12-12 PROCEDURE — 63600175 PHARM REV CODE 636 W HCPCS: Performed by: INTERNAL MEDICINE

## 2022-12-12 PROCEDURE — 97530 THERAPEUTIC ACTIVITIES: CPT

## 2022-12-12 PROCEDURE — 84100 ASSAY OF PHOSPHORUS: CPT

## 2022-12-12 PROCEDURE — 25000003 PHARM REV CODE 250: Performed by: HOSPITALIST

## 2022-12-12 PROCEDURE — 83735 ASSAY OF MAGNESIUM: CPT

## 2022-12-12 PROCEDURE — 97110 THERAPEUTIC EXERCISES: CPT

## 2022-12-12 PROCEDURE — 25000003 PHARM REV CODE 250: Performed by: STUDENT IN AN ORGANIZED HEALTH CARE EDUCATION/TRAINING PROGRAM

## 2022-12-12 PROCEDURE — 99231 PR SUBSEQUENT HOSPITAL CARE,LEVL I: ICD-10-PCS | Mod: 95,,, | Performed by: INTERNAL MEDICINE

## 2022-12-12 PROCEDURE — 93010 EKG 12-LEAD: ICD-10-PCS | Mod: ,,, | Performed by: INTERNAL MEDICINE

## 2022-12-12 PROCEDURE — 63600175 PHARM REV CODE 636 W HCPCS: Performed by: STUDENT IN AN ORGANIZED HEALTH CARE EDUCATION/TRAINING PROGRAM

## 2022-12-12 PROCEDURE — 93010 ELECTROCARDIOGRAM REPORT: CPT | Mod: ,,, | Performed by: INTERNAL MEDICINE

## 2022-12-12 PROCEDURE — 20600001 HC STEP DOWN PRIVATE ROOM

## 2022-12-12 PROCEDURE — 99233 SBSQ HOSP IP/OBS HIGH 50: CPT | Mod: ,,, | Performed by: INTERNAL MEDICINE

## 2022-12-12 RX ORDER — SODIUM CHLORIDE 9 MG/ML
INJECTION, SOLUTION INTRAVENOUS ONCE
Status: COMPLETED | OUTPATIENT
Start: 2022-12-12 | End: 2022-12-12

## 2022-12-12 RX ORDER — CIPROFLOXACIN 250 MG/1
500 TABLET, FILM COATED ORAL DAILY
Status: DISCONTINUED | OUTPATIENT
Start: 2022-12-12 | End: 2022-12-12

## 2022-12-12 RX ORDER — HEPARIN SODIUM 1000 [USP'U]/ML
1000 INJECTION, SOLUTION INTRAVENOUS; SUBCUTANEOUS
Status: DISCONTINUED | OUTPATIENT
Start: 2022-12-12 | End: 2022-12-13 | Stop reason: HOSPADM

## 2022-12-12 RX ADMIN — CARVEDILOL 25 MG: 25 TABLET, FILM COATED ORAL at 09:12

## 2022-12-12 RX ADMIN — ACETAMINOPHEN 650 MG: 325 TABLET ORAL at 09:12

## 2022-12-12 RX ADMIN — HYDRALAZINE HYDROCHLORIDE 50 MG: 50 TABLET ORAL at 06:12

## 2022-12-12 RX ADMIN — HEPARIN SODIUM 1000 UNITS: 1000 INJECTION, SOLUTION INTRAVENOUS; SUBCUTANEOUS at 05:12

## 2022-12-12 RX ADMIN — AMLODIPINE BESYLATE 5 MG: 5 TABLET ORAL at 08:12

## 2022-12-12 RX ADMIN — HYDRALAZINE HYDROCHLORIDE 50 MG: 50 TABLET ORAL at 09:12

## 2022-12-12 RX ADMIN — LEVOTHYROXINE SODIUM 25 MCG: 25 TABLET ORAL at 06:12

## 2022-12-12 RX ADMIN — CIPROFLOXACIN 500 MG: 250 TABLET, COATED ORAL at 08:12

## 2022-12-12 RX ADMIN — PANTOPRAZOLE SODIUM 40 MG: 40 TABLET, DELAYED RELEASE ORAL at 06:12

## 2022-12-12 RX ADMIN — SODIUM CHLORIDE: 9 INJECTION, SOLUTION INTRAVENOUS at 02:12

## 2022-12-12 RX ADMIN — WHITE PETROLATUM: 1.75 OINTMENT TOPICAL at 09:12

## 2022-12-12 RX ADMIN — CARVEDILOL 25 MG: 25 TABLET, FILM COATED ORAL at 08:12

## 2022-12-12 RX ADMIN — PREDNISONE 15 MG: 5 TABLET ORAL at 10:12

## 2022-12-12 RX ADMIN — QUETIAPINE FUMARATE 12.5 MG: 25 TABLET ORAL at 09:12

## 2022-12-12 RX ADMIN — ATOVAQUONE 1500 MG: 750 SUSPENSION ORAL at 08:12

## 2022-12-12 RX ADMIN — ERYTHROPOIETIN 2880 UNITS: 3000 INJECTION, SOLUTION INTRAVENOUS; SUBCUTANEOUS at 10:12

## 2022-12-12 NOTE — PLAN OF CARE
WYATT in communication with Kimberley from [facility] regarding pt's discharge plan.  Kimberley advised SW pt accepted waiting for insurance auth. WYATT sent LOC review to [facility] for LTAC placement.  Pt will also need dialysis intermittently.  WYATT notified pt's daughter, Roro [phone], of above information.  Pt's daughter agreeable to [facility].      Awaiting a response from [facility] on insurance auth.      12/12/22 1005   Post-Acute Status   Post-Acute Authorization Placement   Post-Acute Placement Status Pending payor review/awaiting authorization (if required)   Diaylsis Status Referrals Sent  (Pt may need dialysis intermittently.)   Discharge Delays None known at this time   Discharge Plan   Discharge Plan A Long-term acute care facility (LTAC)   Discharge Plan B Skilled Nursing Facility       Indigo Brown LMSW  PRN-  [facility]  Ext. [phone]

## 2022-12-12 NOTE — ASSESSMENT & PLAN NOTE
Patient developed dysuria as UOP increased s/p anuria.  UA suspicious for infection and urine culture growing > 100K GNR  Ceftriaxone started 12/11

## 2022-12-12 NOTE — PT/OT/SLP PROGRESS
"Physical Therapy Treatment    Patient Name:  Kristin Goodman   MRN:  [id number]    Recommendations:     Discharge Recommendations: nursing facility, skilled  Discharge Equipment Recommendations: walker, rolling  Barriers to discharge: Pt currently requiring increased level of assistance with mobility    Assessment:     Kristin Goodman is a 77 y.o. female admitted with a medical diagnosis of Microscopic polyangiitis.  She presents with the following impairments/functional limitations: weakness, impaired endurance, impaired functional mobility, gait instability, impaired balance, decreased lower extremity function, edema. Pt continues to present with most difficulty performing STS from low surface. Pt tolerated B LE wheelchair propulsion to outside of hospital and performed B LE therex. Pt continues to benefit from skilled PT services while in house in order to address the aforementioned deficits.      Rehab Prognosis: Good; patient would benefit from acute skilled PT services to address these deficits and reach maximum level of function.    Recent Surgery: Procedure(s) (LRB):  EGD (ESOPHAGOGASTRODUODENOSCOPY) (N/A) 28 Days Post-Op    Plan:     During this hospitalization, patient to be seen 3 x/week to address the identified rehab impairments via gait training, therapeutic activities, therapeutic exercises, neuromuscular re-education and progress toward the following goals:    Plan of Care Expires:  12/28/22    Subjective     "It's a bit chilly outside"    Pain/Comfort:  Pain Rating 1: 0/10  Pain Rating Post-Intervention 1: 0/10      Objective:     Communicated with RN prior to session.  Patient found up in chair with  (no lines intact) upon PT entry to room.     General Precautions: Standard, fall  Orthopedic Precautions: N/A  Braces: N/A  Respiratory Status: Room air     Functional Mobility:  Transfers:     Sit to Stand:  maximal assistance with rolling walker  Bed to Chair: stand by assistance with  rolling walker  " "using  Step Transfer  Gait: Pt amb 5' x2 RW SBA and presented with B shortened step, decreased obey, head down, RW too anterior  Balance:   Good sitting balance  Fair standing balance  Wheelchair Propulsion:  Pt propelled Standard wheelchair x 40 feet on  unleveled outside/ground surface  with  Bilateral lower extremity with Minimal Assistance.       Pt declined transfer training outside stating "I'll do that next time"    AM-PAC 6 CLICK MOBILITY  Turning over in bed (including adjusting bedclothes, sheets and blankets)?: 4  Sitting down on and standing up from a chair with arms (e.g., wheelchair, bedside commode, etc.): 3  Moving from lying on back to sitting on the side of the bed?: 3  Moving to and from a bed to a chair (including a wheelchair)?: 3  Need to walk in hospital room?: 3  Climbing 3-5 steps with a railing?: 2  Basic Mobility Total Score: 18       Treatment & Education:  Tolerated B LE therex: marching, LAQ, heel raises, toe raises    Educated pt on PT role/POC  Educated pt on importance of OOB activity and daily ambulation   Pt educated on proper body mechanics, safety techniques, and energy conservation with PT facilitation and cueing throughout session   Pt verbalized understanding      Patient left sitting edge of bed with call button in reach, RN notified, and sister and daughter present..    GOALS:   Multidisciplinary Problems       Physical Therapy Goals          Problem: Physical Therapy    Goal Priority Disciplines Outcome Goal Variances Interventions   Physical Therapy Goal     PT, PT/OT Ongoing, Progressing     Description: Goals remain appropriate and extended to: 2022     Patient will increase functional independence with mobility by performin. Supine to sit with contact guard assistance  2. Sit to supine with contact guard assistance MET   2a. Sit to supine independently  3. Sit to stand transfer with minimum assistance MET   3a. STS supvn LRAD  4. Gait  x 40 " feet with minimum assistance using LRAD as needed MET 12/09  4a. Gait x 250' LRAD supvn  5. Lower extremity exercise program x10 reps per handout, with independence                        Time Tracking:     PT Received On: 12/12/22  PT Start Time: 1120     PT Stop Time: 1150  PT Total Time (min): 30 min     Billable Minutes: Therapeutic Activity 15 and Therapeutic Exercise 10    Treatment Type: Treatment  PT/PTA: PT     PTA Visit Number: 3     12/12/2022

## 2022-12-12 NOTE — NURSING
Pt has no IV access, Anesthesia was never able to come place IV from previous night shift. (See 12/11 0745 note) multiple attempts to place an IV were unsuccessful. . No one was available at this point in time to place an IV with ultrasound. Nate SPARROW was notified. New order placed for midline. Nate SPARROW aware IV abx unable to be given this shift. Will continue to monitor.

## 2022-12-12 NOTE — PROGRESS NOTES
[facility] - Intensive Care ([place] [id number])  [facility] Medicine  Telemedicine Progress Note    Patient Name: Kristin Goodman  MRN: [id number]  Patient Class: IP- Inpatient   Admission Date: 10/17/2022  Length of Stay: 55 days  Attending Physician: Yaquelin Concepcion MD  Primary Care Provider: Lori Hernandez MD    Subjective:     Principal Problem:Microscopic polyangiitis    HPI:  Kristin Goodman is a 77 y.o. female with a past medical history of HTN, hypothyroidism, HFpEF, and osteoarthritis of the arm who has presented to the ED for cough, SOB, and weakness. Daughter is present at the bedside. Patient presented to the ED on 9/24 with hemoptysis, CT and CXR showed high suspicion for multilobar pneumonia. Patient was discharged with a 10-day course of levofloxacin and an albuterol inhaler. Patient followed up with her PCP on 10/4 with slight improvement in symptoms and went back to work. Patient continued to have worsening symptoms and presented to the ED again on 10/14 for evaluation and no interventions were done. Patient endorses fevers over the last few days up to 102.4 F with progressive SOB. She endorses hemoptysis with moderate amount of blood, generalized weakness, productive cough, SOB, and loss of appetite. Denies chest pain, nausea, vomiting, abdominal pain, or urinary changes.    ED: hypertensive up to 219/93 and tachycardic up to 117. Oxygen saturation on 92% on RA, placed on 5L NC with sats >97%. CBC remarkable for leukocytosis of 16.03 and Hb 7.7. K 3.3. Cr 1.6, baseline ~1.0. . Troponin 0.061. COVID and flu negative. EKG NSR. CT chest with contrast pending at time of admission. Given home amlodipine and lisinopril. Given 500mL NS, IV azithromycin, cefepime and vanc.       Overview/Hospital Course:  HM Course:  Ms. Goodman was admitted to Hospital Medicine for management of multifocal pneumonia and acute hypoxemic respiratory failure after presenting to the ED with fevers, cough, hemoptysis, and  dyspnea. She required escalation to the MICU due to abrupt decline in her respiratory status, associated with hemoptysis and requiring NIPPV. CT chest showed bilateral patchy ground glass opacities. Labs additionally were notable for acute renal failure on CKD3. Pulmonology was consulted while under the care of [facility] and felt this picture was consistent with diffuse alveolar hemorrhage.     ICU Course:   Her workup revealed a strongly positive MPO Ab consistent with clinical picture of microscopic polyangiitis with pulmonary and renal involvement. She underwent Trialysis catheter placement 10/25/2022 in the Medical ICU. 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     Rheumatology was consulted, extensive workup revealed MITUL + 1:2560 homogenous, +dsDNA, normal complements, PR3 1.2 (slight +),  (+), cANCA + 1:80, pANCA neg, GBMAb neg, trace cryos. Due to concern for MPA, she was started on pulse dose IV methylprednisolone 1000mg for 3 days, and plasmapheresis under the guidance of Transfusion Medicine. Patient was placed on steroid taper and completed PLEX. She additionally received rituximab and cyclophosphamide. OI prophylaxis was provided with atovaquone due to a sulfa allergy.     Nephrology was consulted for evaluation of acute renal failure in the setting of MPA. She did require SLED in the ICU for renal clearance and remains on intermittent hemodialysis. She is expected to continue HD once discharged.     Her acute hypoxemic respiratory failure improved and she was weaned off of supplemental oxygen. She stepped down to [facility] 10/28.     HM Course:  She underwent MBBS for evaluation of dysphagia, which showed global delayed initiation of swallow  and aspiration with thin liquids. Due to concern for possible prior stroke, head imaging was completed and negative for acute finding. She was NPO with NG tube. ENT was consulted for evaluation of dysphagia and cervical adenopathy.  Patient did not tolerate laryngoscopy; unable to visualize vocal cords but given her lack of hoarseness, they did not suspect vocal fold paresis/paralysis. MRI recommended by rheum to fully rule out any potential neurological cause of the patient's dysphagia; MRI demonstrated remote left thalamic lacunar type infarct and punctate remote left cerebellar infarcts.  She continued to work with speech therapy.  EGD completed 11/14 without abnormalities.  Per GI, Suspect some aspect of esophageal dysmotility in setting of critical illness. No further testing at this time.  Per SLP, diet advanced on 11/15 and NG tube removed.    Her other chronic medical conditions including hypothyroidism, diastolic CHF, and hypertension were managed with her home medications, with dose adjustments as needed.     Patient was noted to have downtrending Hg 11/17- required 1u pRBC. Rheum continued to follow for further steroid dosing. Patient was started on 2g NaCl tab TID for SIADH. SLP recommended mechanical soft diet with thin liquids 11/17. Na normalized 11/18. Hg improved to 8.5 following 1u pRBC. Patient had some hyperkalemia- started on scheduled lokelma. Patient continued to have improved UOP; however, BUN: Cr ratio uptrended. Nephro decided to hang off on HD as patient was having 500-600mL/24hr. He was started on scheduled NaHCO3 as ewll. Patient underwent HD 11/23. Patient had drop in Hg once again- 6.5 on 11/25. Given 1u pRBC (2nd unit). Underwent HD 11/30. Nephrology following to re-evaluate daily for need for HD and permacath placement.       Telemedicine  This service was provided by [facility].    Patient was transferred to [facility] on:  12/03/2022     Chief Complaint   Patient  presents with    Multiple complaints     Seen 2 other times since sept, cont with now fever, cough with blood      The patient location is: [id number]   Admitted 10/17/2022 10:12 AM    Interval History / Events Overnight:   The patient is able to provide adequate history. Additional history was obtained from past medical records. On Call Provider contacted about a fall overnight without injury    Patient complains of nothing specific. Symptoms have been unchanged since yesterday. Associated symptoms include: fatigue. Symptoms are stable.     Lab test(s) reviewed: H&H stable    Review of Systems   Constitutional:  Negative for fever.   Respiratory:  Negative for shortness of breath.      Objective:     Vital Signs (Most Recent):  Temp: 98.1 °F (36.7 °C) (12/11/22 1135)  Pulse: 71 (12/11/22 1135)  Resp: 18 (12/11/22 1135)  BP: (!) 155/67 (12/11/22 1135)  SpO2: 95 % (12/11/22 1135)   Vital Signs (24h Range):  Temp:  [96.4 °F (35.8 °C)-98.2 °F (36.8 °C)] 98.1 °F (36.7 °C)  Pulse:  [67-87] 71  Resp:  [12-18] 18  SpO2:  [94 %-97 %] 95 %  BP: (132-166)/(63-77) 155/67     Weight: 66.3 kg (146 lb 2.6 oz)  Body mass index is 27.62 kg/m².    Intake/Output Summary (Last 24 hours) at 12/11/2022 1354  Last data filed at 12/11/2022 0500  Gross per 24 hour   Intake 580 ml   Output 1 ml   Net 579 ml        Physical Exam  Constitutional:       General: She is not in acute distress.     Appearance: Normal appearance.   Eyes:      General: Lids are normal. No scleral icterus.        Right eye: No discharge.         Left eye: No discharge.      Conjunctiva/sclera: Conjunctivae normal.   Neck:      Trachea: Phonation normal.   Cardiovascular:      Rate and Rhythm: Normal rate.      Comments: Monitor / Vital signs reviewed at time of visit  Pulmonary:      Effort: Pulmonary effort is normal. No tachypnea, accessory muscle usage or respiratory distress.   Abdominal:      General: There is no distension.   Neurological:      Mental  Status: She is alert. She is not disoriented.   Psychiatric:         Attention and Perception: Attention normal.         Mood and Affect: Affect normal.         Behavior: Behavior is cooperative.       Significant Labs:  Recent Labs   Lab 08/02/22  1156   HGBA1C 5.5       Recent Labs   Lab 12/09/22  0352 12/10/22  0320 12/11/22  0227   WBC 8.81 7.26 8.97   HGB 7.8* 7.8* 7.5*   HCT 24.5* 25.4* 25.1*   * 130* 144*       Recent Labs   Lab 12/09/22  0352 12/10/22  0320 12/11/22  0227   GRAN 65.8  5.8 69.1  5.0 64.7  5.8   LYMPH 24.3  2.1 23.3  1.7 24.5  2.2   MONO 8.3  0.7 5.4  0.4 8.2  0.7   EOS 0.0 0.0 0.0       Recent Labs   Lab 12/09/22  0352 12/10/22  0320 12/11/22  0227    138 140   K 4.8 5.3* 4.7    103 105   CO2 21* 20* 20*   BUN 72* 79* 80*   CREATININE 8.8* 9.1* 9.7*   GLU 74 86 78   CALCIUM 7.5* 7.7* 7.7*   MG 1.8 1.9 1.9   PHOS 5.4* 5.3* 5.4*       Recent Labs   Lab 12/07/22  0440   INR 1.1       Recent Labs   Lab 09/24/22  1120 10/14/22  1638 10/17/22  1200 10/23/22  1822 10/30/22  0426 11/25/22  0255   PROCAL 0.06 0.12  --   --   --   --    LACTATE 0.7  --  0.9 0.9  --   --    DDIMER  --  1.96*  --   --   --   --    FERRITIN  --   --   --   --  374* 588*       SARS-CoV2 (COVID-19) Qualitative PCR (no units)   Date Value   10/24/2022 Not Detected   02/14/2022 Not Detected   09/25/2020 Not Detected   05/21/2020 Not Detected     SARS-CoV-2 RNA, Amplification, Qual (no units)   Date Value   10/17/2022 Negative     POC Rapid COVID (no units)   Date Value   09/24/2022 Negative       ECG Results              EKG 12-lead (Final result)  Result time 10/17/22 14:26:15      Final result by Interface, Lab In [facility] (10/17/22 14:26:15)                   Narrative:    Test Reason : R06.02,    Vent. Rate : 093 BPM     Atrial Rate : 093 BPM     P-R Int : 126 ms          QRS Dur : 070 ms      QT Int : 356 ms       P-R-T Axes : 048 052 030 degrees     QTc Int : 442 ms    Normal sinus  rhythm  Normal ECG  When compared with ECG of 14-OCT-2022 14:26,  Limb lead reversal has been corrected  Confirmed by Jc Barbosa MD ([id number]) on 10/17/2022 2:26:04 PM    Referred By: [email or web address]   SELF           Confirmed By:Jc Barbosa MD                                    Results for orders placed during the hospital encounter of 10/17/22    Echo    Interpretation Summary  · The left ventricle is normal in size with normal systolic function. The estimated ejection fraction is 65%.  · Normal right ventricular size with normal right ventricular systolic function.  · Grade I left ventricular diastolic dysfunction.  · Mild tricuspid regurgitation.  · There is pulmonary hypertension. The estimated PA systolic pressure is 56 mmHg.  · Normal to low central venous pressure (3 mmHg).      MRI Brain Without Contrast  Narrative: EXAMINATION:  MRI BRAIN WITHOUT CONTRAST    CLINICAL HISTORY:  Dizziness, non-specific;    TECHNIQUE:  Multiplanar multisequence MR imaging of the brain was performed without contrast.    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  Periventricular and subcortical white matter changes are present likely related to chronic microvascular disease.  Small remote left medial occipital cortical infarct.  There is no evidence of recent or remote hemorrhage.  No extra-axial collections are identified.  No hydrocephalus.  Skull base structures are unremarkable.  Impression: Mild chronic ischemic changes are present, as above.    Electronically signed by: Timothy Bess MD  Date:    12/09/2022  Time:    08:32      Labs and Imaging within the last 24 hours listed above were reviewed.       Diet: [facility]; Lactose Restricted  Significant LDAs:   IV Access Type: Peripheral and Dialysis Access  Urinary Catheter Indication if present: Patient Does Not Have Urinary Catheter  Other Lines/Tubes/Drains:    HIGH RISK CONDITION(S):    Patient has a condition that poses threat to life and bodily function: Acute Renal Failure     Goals of Care:    Previous admission:  10/14/22  Likely prognosis:  Fair  Code Status: Full Code  Comfort Only: No  Hospice: No  Goals at discharge: remain at home, with physician follow-up    Discharge Planning   ANTHONY: 12/14/2022     Code Status: Full Code   Is the patient medically ready for discharge?: Yes    Reason for patient still in hospital (select all that apply): Patient trending condition, Treatment, and Pending disposition  Discharge Plan A: Skilled Nursing Facility   Discharge Delays: None known at this time      Assessment/Plan:      * Microscopic polyangiitis  As of 10/26/22 strongly positive MPO Ab (in contrast to borderline positive PR3), consistent with clinical picture of microscopic polyangiitis with pulmonary, and renal involvement after presenting with acute hypoxemic respiratory failure, hemoptysis, and acute renal failure.  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   3)  CHANGES SUGGESTIVE OF FOCAL ACUTE PYELONEPHRITIS.   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 Currently on oral steroid taper   - plan for 20mg IV daily on 11/6 for 14 days (last dose of 20mg equivalent 11/20/2022); completing oral Prednisone per PEXIVAS steroid taper  -  apheresis: underwent PLEX 10/26, 10/27, 10/30, 11/1, 11/2, 11/3  - rituximab every 7 days for 4 doses: Dose #1: 10/27, #2 11/3, #3 11/10, and #4 given 11/17  - cyclophosphamide every 14 days for 2 doses: 10/27, 11/10  - Opportunistic Infection ppx: atovaquone 1500mg PO daily  - GI bleed prophylaxis: pantoprazole 40mg BID    Acute cystitis without hematuria  Patient developed dysuria as UOP increased s/p anuria.  UA suspicious for infection and urine culture growing > 100K GNR  Ceftriaxone started 12/11    Dizzy spells  Occurring intermittently during hospitalization but also when patient was younger  -associated with nausea, weakness; shifting eye movements present  -meclizine prn  -Neurology evaluation due to recent vasculitis diagnosis; MRI brain ordered 12/8 unremarkable  - Neurology recommends:   -- PT/OT for vestibular therapy   -- OP workup with cardiology and possible tilt table test   -- OP Neurology follow up for BPPV     Anemia due to chronic kidney disease  - due to CKD  - Hg stable  - Monitor trend     Primary pauci-immune necrotizing and crescentic glomerulonephritis  Patient with acute kidney injury likely due to microscopic polyangiitis.  WILFREDO is currently stable. Labs reviewed- Renal function/electrolytes with Estimated Creatinine Clearance: 4.4 mL/min (A) (based on SCr of 9.7 mg/dL (H)). according to latest data. Monitor urine output and serial BMP and adjust therapy as needed. Avoid nephrotoxins and renally dose meds for GFR listed above.   Nephrology following and patient receives HD as indicated.     Gastric reflux  - PPI BID    High risk medications (not anticoagulants) long-term use  See history of vasculitis therapy    Anuria  - Improving urine output  - Nephrology following- re-evaluating daily for need for Intermittent HD; permacath placement on 12/8  - improving    Gastrointestinal hemorrhage with melena  Starting having hemoptysis and melena with associated acute blood loss anemia earlier  in hospital stay. Patient with known internal hemorrhoids, mild sigmoid diverticulosis, history of gastritis and + H pylori (2012 EGD).   - GI consulted 10/19, unable to perform EGD due to instability and respiratory failure at the time  - PPI PO BID   - Monitor CBC closely for signs of recurrent bleed  - EGD w/no acute bleed seen  - Transfused 2units pRBC    Dysphagia  SLP following  MBSS showing global weakness in swallowing as well as aspiration with thin liquids.   ENT consulted but patient did not tolerate laryngoscopy   - Dysphagia possibly due to previous stroke  - Briefly required NGT, worked with SLP  - NGT removed- being treated with nystatin swish and swallow for thrush  - GI consulted as well for concern of oropharyngeal candidiasis;  Low suspicion for esophageal candidiasis without odynophagia however can have if white plaques have been seen on oropharynx, ok to start fluconazole empirically for possible esophageal candidiasis  - EGD on 11/14 without abnormality; per GI, Suspect some aspect of esophageal dysmotility in setting of critical illness. No further testing at this time.  - Advanced to renal diet 11/22    Moderate malnutrition  Nutrition consulted. Most recent weight and BMI monitored-     Malnutrition (Moderate to Severe)  Weight Loss (Malnutrition): 7.5% in 3 months    Measurements:  Wt Readings from Last 1 Encounters:   12/07/22 66.3 kg (146 lb 2.6 oz)   Body mass index is 27.62 kg/m².    Recommendations: Recommendation/Intervention: 1.  Goals: Meet % EEN, EPN by RD f/u date    Patient has been screened and assessed by RD. RD will follow patient.      Acute renal failure on dialysis  Due to microscopic polyangiitis. Remains on hemodialysis intermittently.   - Baseline creatinine 1.0-1.2.   - New onset proteinuria/hematuria.   - Renal biopsy completed   - tunneled HD catheter in place 12/8 for intermittent Hemodialysis  - Nephrology following  - renally dose all medications  -  intermittent Hemodialysis as indicated, per Nephrology: pursue outpatient HD; IR consulted for tunneled catheter placement; case management to assist with HD chair.  - will need HD center capable of intermittent HD while patient is at SNF    Acute hypoxemic respiratory failure  Multifocal pneumonia  Hemoptysis  Dyspnea  Diffuse Alveolar Hemorrahge  In the setting of a new diagnosis of microscopic polyangiitis, presented with fevers, dyspnea,.  - Chest imaging was consistent with diffuse alveolar hemorrhage.  CT chest w/c 10/17 with JESSICA perihilar dense consolidations w/ peripheral patcy areas of GGO, JESSICA PNA, less c/f cardiogenic pulmonary edema.  - Patient upgraded to Medical ICU 10/23/22 with abrupt respiratory decline requiring NIPPV, improved with high dose IV steroids, plasmapheresis, emergent hemodialysis.   - Unable to perform bronchoscopy in the Medical ICU due to tenuous respiratory status  Hypoxia has resolved  - weaned to room air  - continue management of underlying triggers with steroid and immunosuppressants  - incentive spirometry and respiratory hygiene  - improved    Lymphadenopathy of head and neck  - Has bilateral neck gland swelling with tenderness; consulted ENT  - No surgical intervention indicated   - Adenopathy likely reactive in nature   - Recommend further workup if it does not resolve within next 2 weeks   - 11/30-- Adenopathy is still present especially right submandibular region  - ENT follow up as OP    Hyponatremia  - thought to be due to SIADH initially- received NaCl tabs  - NaCl tabs discontinued  - Na stable     Other specified anemias  In the setting of GI bleed with melena  - Evaluated by GI, see GI bleed documentation  - Last transfusion 10/28, Hgb slowly trending down since then  - Hgb 6.9 on 11/5  - Monitor CBC   - Treat with pRBC transfusion PRN Hgb <7  - Transfused 3 units pRBC    Chronic diastolic heart failure  Pulmonary Hypertension due to left heart disease  TTE (10/2022)  EF 65%, G1DD  Home meds: Lisinopril, Toprol  - Active volume control with intermittent Hemodialysis per Nephrology   - Daily weights (standing if tolerated)  - Strict I/Os  - Fluid restriction 1.5 day  - Cardiac diet 2 g Na restriction    Hyperkalemia  - trial of Lokelma 12/10; HD if persists.  - improved    Primary hypertension  - Patient was unable to tolerate PO mediations, all meds administered via NG tube until removed on 11/15.  - continue to monitor blood pressure trend closely and adjust antihypertensive regimen as clinically indicated and tolerated.  - amLODIPine, carvediloL, hydrALAZINE; doses decreased on 12/4 due to low BP with extreme fatigue and weakness especially on HD days  --continue to monitor and adjust regimen as necessary    Hypothyroid  - Continue Synthroid 25 mcg  - TSH 0.585 10/27/22        Active Hospital Problems    Diagnosis  POA    *Microscopic polyangiitis [M31.7]  Yes    Acute cystitis without hematuria [N30.00]  No    Dizzy spells [R42]  Yes    Anemia due to chronic kidney disease [N18.9, D63.1]  Yes    Anuria [R34]  Yes    High risk medications (not anticoagulants) long-term use [Z79.899]  Not Applicable    Gastric reflux [K21.9]  Yes    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, N05.7]  Yes    Gastrointestinal hemorrhage with melena [K92.1]  No    Dysphagia [R13.10]  Yes    Moderate malnutrition [E44.0]  Yes    Acute renal failure on dialysis [N17.9, Z99.2]  Not Applicable    Acute hypoxemic respiratory failure [J96.01]  Yes    Lymphadenopathy of head and neck [R59.1]  Yes    Hyponatremia [E87.1]  Yes    Other specified anemias [D64.89]  Yes    Chronic diastolic heart failure [I50.32]  Yes    Hyperkalemia [E87.5]  Yes    Primary hypertension [I10]  Yes    Hypothyroid [E03.9]  Yes      Resolved Hospital Problems    Diagnosis Date Resolved POA    Hemoptysis [R04.2] 11/05/2022 Yes    Diffuse pulmonary alveolar hemorrhage [R04.89]  11/05/2022 Yes    Hypernatremia [E87.0] 11/05/2022 No    Dysuria [R30.0] 11/05/2022 No    Septic shock [A41.9, R65.21] 11/05/2022 Yes    Cervical adenopathy [R59.0] 11/05/2022 No    Acute renal failure superimposed on stage 3 chronic kidney disease [N17.9, N18.30] 11/05/2022 Yes    Sepsis due to pneumonia [J18.9, A41.9] 10/27/2022 Yes    Multifocal pneumonia [J18.9] 11/05/2022 Yes    Pulmonary HTN [I27.20] 11/05/2022 Yes    CKD (chronic kidney disease), stage III [N18.30] 11/05/2022 Yes    Elevated CPK [R74.8] 10/27/2022 Yes     Chronic       Inpatient Medications Prescribed for Management of Current Problems:     Scheduled Meds:    amLODIPine  5 mg Oral Daily    atovaquone  1,500 mg Oral Daily    carvediloL  25 mg Oral BID    cefTRIAXone (ROCEPHIN) IVPB  1 g Intravenous Q24H    epoetin hira (PROCRIT) injection  50 Units/kg Subcutaneous Every Mon, Wed, Fri    hydrALAZINE  50 mg Oral Q8H    levothyroxine  25 mcg Oral Before breakfast    pantoprazole  40 mg Oral BID AC    predniSONE  15 mg Oral with lunch    Followed by    [START ON 12/18/2022] predniSONE  12.5 mg Oral with lunch    Followed by    [START ON 1/1/2023] predniSONE  10 mg Oral with lunch    [START ON 1/15/2023] predniSONE  5 mg Oral Daily    QUEtiapine  12.5 mg Oral QHS    senna-docusate 8.6-50 mg  1 tablet Oral BID    sodium chloride 0.9% flush bag IVPB   Intravenous 1 time in Clinic/HOD    white petrolatum   Topical (Top) Daily     Continuous Infusions:   As Needed: acetaminophen, albuterol-ipratropium, aluminum-magnesium hydroxide-simethicone, bisacodyL, cloNIDine, dextrose 10%, dextrose 10%, heparin (porcine), heparin (porcine), heparin (porcine), heparin, porcine (PF), heparin, porcine (PF), meclizine, melatonin, methocarbamoL, metoclopramide HCl, naloxone, ondansetron, prochlorperazine, simethicone, sodium chloride 0.9%, sodium chloride 0.9%, sodium chloride 0.9%, sodium chloride 0.9%, sodium chloride 0.9%, sodium chloride  0.9%, sucralfate    VTE Risk Mitigation (From admission, onward)         Ordered     heparin (porcine) injection 1,000 Units  As needed (PRN)         11/29/22 0857     heparin (porcine) injection 1,000 Units  As needed (PRN)         11/22/22 0832     heparin, porcine (PF) 100 unit/mL injection flush 500 Units  As needed (PRN)         11/17/22 1343     heparin, porcine (PF) 100 unit/mL injection flush 500 Units  As needed (PRN)         11/10/22 1430     heparin (porcine) injection 1,000 Units  As needed (PRN)         11/09/22 1601     heparin (porcine) injection 1,000 Units  As needed (PRN)         11/08/22 0854     Place sequential compression device  Until discontinued         10/25/22 1731     IP VTE HIGH RISK PATIENT  Once         10/17/22 1354     Reason for No Pharmacological VTE Prophylaxis  Once        Question:  Reasons:  Answer:  Risk of Bleeding    10/17/22 1354              I have completed this tele-visit without the assistance of a telepresenter.    The attending portion of this evaluation, treatment, and documentation was performed per Yaquelin Concepcion MD via Telemedicine AudioVisual using the secure Vidyo software platform with 2 way audio/video. The provider was located off-site and the patient is located in the hospital. The aforementioned video software was utilized to document the relevant history and physical exam. Secure eCareManager software platform was used instead of SwitchForce for this visit.      Yaquelin Concepcion MD  Department of Hospital Medicine   [facility] - Intensive Care ([place]

## 2022-12-12 NOTE — ASSESSMENT & PLAN NOTE
- thought to be due to SIADH initially- received NaCl tabs  - NaCl tabs discontinued  - Na stable

## 2022-12-12 NOTE — ASSESSMENT & PLAN NOTE
Patient with acute kidney injury likely due to microscopic polyangiitis.  WILFREDO is currently stable. Labs reviewed- Renal function/electrolytes with Estimated Creatinine Clearance: 4.4 mL/min (A) (based on SCr of 9.7 mg/dL (H)). according to latest data. Monitor urine output and serial BMP and adjust therapy as needed. Avoid nephrotoxins and renally dose meds for GFR listed above.   Nephrology following and patient receives HD as indicated.

## 2022-12-12 NOTE — PT/OT/SLP PROGRESS
Occupational Therapy      Patient Name:  Kristin Goodman   MRN:  [id number]    Patient not seen today secondary to  (pt away at HD upon time of attempt (1419).  OT unable to return later after HD completion.). Will follow-up as appropriate.    12/12/2022

## 2022-12-12 NOTE — PROGRESS NOTES
[facility] - Intensive Care ([place] [address])  Nephrology  Progress Note    Patient Name: Kristin Goodman  MRN: [id number]  Admission Date: 10/17/2022  Hospital Length of Stay: 56 days  Attending Provider: Yaquelin Concepcion MD   Primary Care Physician: Lori Hernandez MD  Principal Problem:Microscopic polyangiitis    Subjective:     HPI: Kristin Goodman is a 77 year old female with hypertension, hypothyroidism, HFpEF who presents for cough, shortness of breath, and weakness.  Patient initially presented to ER on 09/24 with hemoptysis and imaging concerning for multilobar pneumonia at which time she was discharged on a 10 day course of levofloxacin.  Patient initially had some improvement but began having worsening symptoms on 10/14.  Patient describes multiple fevers and progressive shortness of breath.  She continues to have intermittent hemoptysis.  Patient with worsening leukocytosis and limited clinical improvement despite broad-spectrum antibiotics.  She remains on cefepime.  Patient is a noted to have baseline creatinine of 1 as recent as 8/2022 but progressive worsening of creatinine in early October.  On admission patient with creatinine of 1.6 (10/17) with subsequent progression and creatinine of 3.0 at time of consultation (10/23).  Nephrology consulted for acute kidney injury.      Interval History: Patient evaluated at bedside. No acute events overnight. Scheduled for iHD treatment today.     Review of patient's allergies indicates:   Allergen Reactions    Ampicillin     Peaches [peach (prunus persica)] Other (See Comments)     Pt unable to state type of reaction. Information obtained from daughter who states she was informed of allergy from patient.    Penicillins      Other reaction(s): Hives    Sulfa (sulfonamide antibiotics) Rash and Hives     Current Facility-Administered Medications   Medication Frequency    0.9%  NaCl infusion Once    acetaminophen tablet 650 mg Q4H PRN    albuterol-ipratropium 2.5  mg-0.5 mg/3 mL nebulizer solution 3 mL Q4H PRN    aluminum-magnesium hydroxide-simethicone 200-200-20 mg/5 mL suspension 30 mL QID PRN    amLODIPine tablet 5 mg Daily    atovaquone 750 mg/5 mL oral liquid 1,500 mg Daily    bisacodyL suppository 10 mg Daily PRN    carvediloL tablet 12.5 mg BID    cloNIDine tablet 0.1 mg Q8H PRN    dextrose 10% bolus 125 mL PRN    dextrose 10% bolus 250 mL PRN    epoetin hira injection 2,880 Units Every Mon, Wed, Fri    glucagon (human recombinant) injection 1 mg PRN    glucose chewable tablet 16 g PRN    glucose chewable tablet 24 g PRN    heparin (porcine) injection 1,000 Units PRN    heparin (porcine) injection 1,000 Units PRN    heparin (porcine) injection 1,000 Units PRN    heparin, porcine (PF) 100 unit/mL injection flush 500 Units PRN    heparin, porcine (PF) 100 unit/mL injection flush 500 Units PRN    hydrALAZINE tablet 50 mg Q8H    insulin aspart U-100 pen 1-10 Units PRN    levothyroxine tablet 25 mcg Before breakfast    meclizine tablet 25 mg TID PRN    melatonin tablet 6 mg Nightly PRN    methocarbamoL tablet 500 mg TID PRN    metoclopramide HCl injection 5 mg Q6H PRN    naloxone 0.4 mg/mL injection 0.02 mg PRN    ondansetron injection 4 mg Q8H PRN    pantoprazole EC tablet 40 mg BID AC    predniSONE tablet 15 mg with lunch    Followed by    [START ON 12/18/2022] predniSONE tablet 12.5 mg with lunch    Followed by    [START ON 1/1/2023] predniSONE tablet 10 mg with lunch    [START ON 1/15/2023] predniSONE tablet 5 mg Daily    prochlorperazine injection Soln 5 mg Q6H PRN    quetiapine split tablet 12.5 mg QHS    simethicone chewable tablet 80 mg QID PRN    sodium chloride 0.9% 250 mL flush bag 1 time in Clinic/Hospitals in [place]    sodium chloride 0.9% bolus 250 mL PRN    sodium chloride 0.9% bolus 250 mL PRN    sodium chloride 0.9% flush 10 mL PRN    sodium chloride 0.9% flush 10 mL PRN    sodium chloride 0.9% flush 10 mL PRN    sodium chloride  0.9% flush 10 mL PRN    sucralfate tablet 1 g TID PRN    white petrolatum 41 % ointment Daily     Facility-Administered Medications Ordered in Other Encounters   Medication Frequency    celecoxib capsule 400 mg Once    fentaNYL 50 mcg/mL injection  mcg PRN    LIDOcaine (PF) 10 mg/ml (1%) injection 10 mg Once PRN    LIDOcaine (PF) 10 mg/ml (1%) injection 10 mg Once    midazolam (VERSED) 1 mg/mL injection 0.5-4 mg PRN    ropivacaine 0.2% [facility] PainPRO Pump infusion 500 ML Continuous       Objective:     Vital Signs (Most Recent):  Temp: 97.3 °F (36.3 °C) (12/08/22 0436)  Pulse: 75 (12/08/22 0750)  Resp: 14 (12/08/22 0750)  BP: (!) 147/69 (12/08/22 0750)  SpO2: 100 % (12/08/22 0750)  O2 Device (Oxygen Therapy): room air (12/07/22 0429)   Vital Signs (24h Range):  Temp:  [97.3 °F (36.3 °C)-98.4 °F (36.9 °C)] 97.3 °F (36.3 °C)  Pulse:  [70-84] 75  Resp:  [14-19] 14  SpO2:  [94 %-100 %] 100 %  BP: (120-158)/(58-71) 147/69     Weight: 66.3 kg (146 lb 2.6 oz) (12/07/22 1200)  Body mass index is 27.62 kg/m².  Body surface area is 1.69 meters squared.    I/O last 3 completed shifts:  In: 300 [P.O.:300]  Out: -     Physical Exam  Vitals and nursing note reviewed.   Constitutional:       General: She is awake.      Appearance: She is well-developed. She is ill-appearing. She is not toxic-appearing or diaphoretic.   HENT:      Head: Normocephalic and atraumatic.      Right Ear: External ear normal.      Left Ear: External ear normal.      Nose:      Comments: + NGT     Mouth/Throat:      Mouth: Mucous membranes are dry.   Eyes:      General: Lids are normal. No scleral icterus.        Right eye: No discharge.         Left eye: No discharge.   Cardiovascular:      Rate and Rhythm: Normal rate and regular rhythm.   Pulmonary:      Effort: Pulmonary effort is normal.      Breath sounds: Normal breath sounds. No wheezing or rhonchi.   Abdominal:      General: Bowel sounds are normal.      Palpations: Abdomen is soft.  "     Tenderness: There is no abdominal tenderness.   Musculoskeletal:         General: No deformity.      Cervical back: Normal range of motion and neck supple. No rigidity or tenderness.      Right lower leg: No edema.      Left lower leg: No edema.   Skin:     General: Skin is warm and dry.   Neurological:      General: No focal deficit present.      Mental Status: She is alert and oriented to person, place, and time. Mental status is at baseline.   Psychiatric:         Attention and Perception: Attention normal.         Behavior: Behavior normal.       Significant Labs:  CBC:   Recent Labs   Lab 12/08/22  0448   WBC 9.43   RBC 2.88*   HGB 8.4*   HCT 26.4*   *   MCV 92   MCH 29.2   MCHC 31.8*       CMP:   Recent Labs   Lab 12/08/22  0448   GLU 82   CALCIUM 7.7*      K 4.4   CO2 23      BUN 69*   CREATININE 8.9*       All labs within the past 24 hours have been reviewed.       Assessment/Plan:     * Microscopic polyangiitis  See "Primary pauci-immune necrotizing and crescentic glomerulonephritis"      Anemia due to chronic kidney disease  EPO TIW    Primary pauci-immune necrotizing and crescentic glomerulonephritis  Kidney biopsy (10/26): pauci immune necrotizing and crescentic glomerulonephritis   s/p IV Solumedrol 1000 mg x3 doses.  PLEX 10/26, 10/27, 10/29, 10/30, 11/1, 11/2, 11/3 (prior to Rituxan)  Rituximab 375 mg/m^2 (600 mg) on 10/27 11/3, 11/10 and 11/17 (completed 4 doses)  Cyclophosphamide 7.5 mg/kg on 10/27 and 11/10 ( every 14 days ); has completed 2 doses; may need another dose of cyclophosphamide 6 weeks after her last dose (around December 22, 2022)  Serum BUN/Cr slowly trending up; will continue monitoring daily for dialysis needs  Now following PEXIVAS steroid taper; currently on Prednisone 15mg PO daily   Sodium bicarb 650mg PO BID   Continue Atovaquone for prophylaxis   Pt still needs dialysis for clearance about once or twice per week; s/p tunneled dialysis catheter placement "   WYATT for outpatient HD chair; will need intermittent HD scheduled at least once a week   Strict I/Os and chart   Avoid nephrotoxic agents as much as possible  Scheduled for iHD treatment today            James Gomez MD  Nephrology  [facility] - Intensive Care ([place]-[facility])

## 2022-12-12 NOTE — PLAN OF CARE
Problem: Adult Inpatient Plan of Care  Goal: Plan of Care Review  Outcome: Ongoing, Progressing  Goal: Patient-Specific Goal (Individualized)  Outcome: Ongoing, Progressing  Goal: Absence of Hospital-Acquired Illness or Injury  Outcome: Ongoing, Progressing  Goal: Optimal Comfort and Wellbeing  Outcome: Ongoing, Progressing  Goal: Readiness for Transition of Care  Outcome: Ongoing, Progressing     Problem: Infection  Goal: Absence of Infection Signs and Symptoms  Outcome: Ongoing, Progressing     Problem: Adjustment to Illness (Sepsis/Septic Shock)  Goal: Optimal Coping  Outcome: Ongoing, Progressing

## 2022-12-12 NOTE — SUBJECTIVE & OBJECTIVE
Telemedicine  This service was provided by [facility] (formerly [facility]3].    Patient was transferred to [facility] on:  12/03/2022     Chief Complaint   Patient presents with    Multiple complaints     Seen 2 other times since sept, cont with now fever, cough with blood      The patient location is: [id number] A   Admitted 10/17/2022 10:12 AM    Interval History / Events Overnight:   The patient is able to provide adequate history. Additional history was obtained from past medical records. No significant events reported by Nursing. Patient in HD unit today  Patient complains of nothing specific. Symptoms have been unchanged since yesterday. Associated symptoms include: fatigue. Symptoms are stable.     Lab test(s) reviewed: H&H stable    Review of Systems   Constitutional:  Negative for fever.   Respiratory:  Negative for shortness of breath.      Objective:     Vital Signs (Most Recent):  Temp: 97.2 °F (36.2 °C) (12/12/22 1118)  Pulse: (!) 56 (12/12/22 1500)  Resp: 17 (12/12/22 1118)  BP: 136/60 (12/12/22 1500)  SpO2: 97 % (12/12/22 1118)   Vital Signs (24h Range):  Temp:  [96 °F (35.6 °C)-98.3 °F (36.8 °C)] 97.2 °F (36.2 °C)  Pulse:  [56-81] 56  Resp:  [12-18] 17  SpO2:  [93 %-97 %] 97 %  BP: (135-160)/(60-74) 136/60     Weight: 66.3 kg (146 lb 2.6 oz)  Body mass index is 27.62 kg/m².    Intake/Output Summary (Last 24 hours) at 12/12/2022 1512  Last data filed at 12/11/2022 1600  Gross per 24 hour   Intake 300 ml   Output --   Net 300 ml        Physical Exam  Constitutional:       General: She is not in acute distress.  Neck:      Trachea: Phonation normal.   Cardiovascular:      Comments: Monitor / Vital signs reviewed at time of visit  Pulmonary:      Effort: No respiratory distress.   Neurological:      Mental Status: She is alert. She is not disoriented.   Psychiatric:         Attention and Perception: Attention normal.         Speech: Speech normal.         Behavior: Behavior is cooperative.         Thought  Content: Thought content normal.       Significant Labs:  Recent Labs   Lab 08/02/22  1156   HGBA1C 5.5       Recent Labs   Lab 12/10/22  0320 12/11/22  0227 12/12/22  0545   WBC 7.26 8.97 8.85   HGB 7.8* 7.5* 8.0*   HCT 25.4* 25.1* 25.3*   * 144* 167       Recent Labs   Lab 12/10/22  0320 12/11/22  0227 12/12/22  0545   GRAN 69.1  5.0 64.7  5.8 60.9  5.4   LYMPH 23.3  1.7 24.5  2.2 28.7  2.5   MONO 5.4  0.4 8.2  0.7 7.2  0.6   EOS 0.0 0.0 0.0       Recent Labs   Lab 12/10/22  0320 12/11/22  0227 12/12/22  0545    140 138   K 5.3* 4.7 4.6    105 103   CO2 20* 20* 21*   BUN 79* 80* 83*   CREATININE 9.1* 9.7* 9.6*   GLU 86 78 78   CALCIUM 7.7* 7.7* 8.0*   MG 1.9 1.9 1.9   PHOS 5.3* 5.4* 5.2*       Recent Labs   Lab 12/07/22  0440   INR 1.1       Recent Labs   Lab 09/24/22  1120 10/14/22  1638 10/17/22  1200 10/23/22  1822 10/30/22  0426 11/25/22  0255   PROCAL 0.06 0.12  --   --   --   --    LACTATE 0.7  --  0.9 0.9  --   --    DDIMER  --  1.96*  --   --   --   --    FERRITIN  --   --   --   --  374* 588*       SARS-CoV2 (COVID-19) Qualitative PCR (no units)   Date Value   10/24/2022 Not Detected   02/14/2022 Not Detected   09/25/2020 Not Detected   05/21/2020 Not Detected     SARS-CoV-2 RNA, Amplification, Qual (no units)   Date Value   10/17/2022 Negative     POC Rapid COVID (no units)   Date Value   09/24/2022 Negative       ECG Results              EKG 12-lead (Final result)  Result time 10/17/22 14:26:15      Final result by Interface, Lab In [facility] (10/17/22 14:26:15)                   Narrative:    Test Reason : R06.02,    Vent. Rate : 093 BPM     Atrial Rate : 093 BPM     P-R Int : 126 ms          QRS Dur : 070 ms      QT Int : 356 ms       P-R-T Axes : 048 052 030 degrees     QTc Int : 442 ms    Normal sinus rhythm  Normal ECG  When compared with ECG of 14-OCT-2022 14:26,  Limb lead reversal has been corrected  Confirmed by Jc Barbosa MD ([id number]) on 10/17/2022 2:26:04  PM    Referred By: SAYRA   SELF           Confirmed By:Jc Barbosa MD                                    Results for orders placed during the hospital encounter of 10/17/22    Echo    Interpretation Summary  · The left ventricle is normal in size with normal systolic function. The estimated ejection fraction is 65%.  · Normal right ventricular size with normal right ventricular systolic function.  · Grade I left ventricular diastolic dysfunction.  · Mild tricuspid regurgitation.  · There is pulmonary hypertension. The estimated PA systolic pressure is 56 mmHg.  · Normal to low central venous pressure (3 mmHg).      MRI Brain Without Contrast  Narrative: EXAMINATION:  MRI BRAIN WITHOUT CONTRAST    CLINICAL HISTORY:  Dizziness, non-specific;    TECHNIQUE:  Multiplanar multisequence MR imaging of the brain was performed without contrast.    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  Periventricular and subcortical white matter changes are present likely related to chronic microvascular disease.  Small remote left medial occipital cortical infarct.  There is no evidence of recent or remote hemorrhage.  No extra-axial collections are identified.  No hydrocephalus.  Skull base structures are unremarkable.  Impression: Mild chronic ischemic changes are present, as above.    Electronically signed by: Timothy Bess MD  Date:    12/09/2022  Time:    08:32      Labs and Imaging within the last 24 hours listed above were reviewed.       Diet: Diet renal [facility]; Lactose Restricted  Significant LDAs:   IV Access Type: Peripheral and Dialysis Access  Urinary Catheter Indication if present: Patient Does Not Have Urinary Catheter  Other Lines/Tubes/Drains:    HIGH RISK CONDITION(S):   Patient has a condition that poses threat to life and bodily function: Acute Renal Failure     Goals of Care:    Previous admission:  10/14/22  Likely prognosis:   Fair  Code Status: Full Code  Comfort Only: No  Hospice: No  Goals at discharge: remain at home, with physician follow-up    Discharge Planning   ANTHONY: 12/14/2022     Code Status: Full Code   Is the patient medically ready for discharge?: Yes    Reason for patient still in hospital (select all that apply): Patient trending condition and Pending disposition  Discharge Plan A: Long-term acute care facility (LTAC)   Discharge Delays: None known at this time

## 2022-12-13 VITALS
HEART RATE: 77 BPM | TEMPERATURE: 98 F | SYSTOLIC BLOOD PRESSURE: 146 MMHG | HEIGHT: 61 IN | OXYGEN SATURATION: 98 % | BODY MASS INDEX: 27.6 KG/M2 | WEIGHT: 146.19 LBS | DIASTOLIC BLOOD PRESSURE: 65 MMHG | RESPIRATION RATE: 18 BRPM

## 2022-12-13 PROBLEM — N39.0 UTI (URINARY TRACT INFECTION): Status: RESOLVED | Noted: 2022-12-13 | Resolved: 2022-12-13

## 2022-12-13 PROBLEM — N39.0 UTI (URINARY TRACT INFECTION): Status: ACTIVE | Noted: 2022-12-13

## 2022-12-13 LAB
ANION GAP SERPL CALC-SCNC: 14 MMOL/L (ref 8–16)
BUN SERPL-MCNC: 26 MG/DL (ref 8–23)
CALCIUM SERPL-MCNC: 8 MG/DL (ref 8.7–10.5)
CHLORIDE SERPL-SCNC: 102 MMOL/L (ref 95–110)
CO2 SERPL-SCNC: 20 MMOL/L (ref 23–29)
CREAT SERPL-MCNC: 4.4 MG/DL (ref 0.5–1.4)
EST. GFR  (NO RACE VARIABLE): 9.9 ML/MIN/1.73 M^2
ESTIMATED AVG GLUCOSE: 82 MG/DL (ref 68–131)
GLUCOSE SERPL-MCNC: 71 MG/DL (ref 70–110)
HBA1C MFR BLD: 4.5 % (ref 4–5.6)
MAGNESIUM SERPL-MCNC: 1.7 MG/DL (ref 1.6–2.6)
PHOSPHATE SERPL-MCNC: 3.5 MG/DL (ref 2.7–4.5)
POTASSIUM SERPL-SCNC: 3.8 MMOL/L (ref 3.5–5.1)
SODIUM SERPL-SCNC: 136 MMOL/L (ref 136–145)

## 2022-12-13 PROCEDURE — 25000003 PHARM REV CODE 250: Performed by: INTERNAL MEDICINE

## 2022-12-13 PROCEDURE — 83036 HEMOGLOBIN GLYCOSYLATED A1C: CPT | Performed by: INTERNAL MEDICINE

## 2022-12-13 PROCEDURE — 99222 1ST HOSP IP/OBS MODERATE 55: CPT | Mod: GC,,, | Performed by: INTERNAL MEDICINE

## 2022-12-13 PROCEDURE — 99239 PR HOSPITAL DISCHARGE DAY,>30 MIN: ICD-10-PCS | Mod: 95,,, | Performed by: INTERNAL MEDICINE

## 2022-12-13 PROCEDURE — 97110 THERAPEUTIC EXERCISES: CPT | Mod: CO

## 2022-12-13 PROCEDURE — 84100 ASSAY OF PHOSPHORUS: CPT

## 2022-12-13 PROCEDURE — 63600175 PHARM REV CODE 636 W HCPCS: Performed by: INTERNAL MEDICINE

## 2022-12-13 PROCEDURE — 83735 ASSAY OF MAGNESIUM: CPT

## 2022-12-13 PROCEDURE — 99239 HOSP IP/OBS DSCHRG MGMT >30: CPT | Mod: 95,,, | Performed by: INTERNAL MEDICINE

## 2022-12-13 PROCEDURE — 97530 THERAPEUTIC ACTIVITIES: CPT | Mod: CO

## 2022-12-13 PROCEDURE — 80048 BASIC METABOLIC PNL TOTAL CA: CPT | Performed by: STUDENT IN AN ORGANIZED HEALTH CARE EDUCATION/TRAINING PROGRAM

## 2022-12-13 PROCEDURE — 25000003 PHARM REV CODE 250: Performed by: HOSPITALIST

## 2022-12-13 PROCEDURE — 99222 PR INITIAL HOSPITAL CARE,LEVL II: ICD-10-PCS | Mod: GC,,, | Performed by: INTERNAL MEDICINE

## 2022-12-13 RX ORDER — NALOXONE HCL 0.4 MG/ML
0.02 VIAL (ML) INJECTION
Status: CANCELLED | OUTPATIENT
Start: 2022-12-13

## 2022-12-13 RX ORDER — GRANULES FOR ORAL 3 G/1
3 POWDER ORAL ONCE
Status: DISCONTINUED | OUTPATIENT
Start: 2022-12-13 | End: 2022-12-13 | Stop reason: HOSPADM

## 2022-12-13 RX ORDER — IPRATROPIUM BROMIDE AND ALBUTEROL SULFATE 2.5; .5 MG/3ML; MG/3ML
3 SOLUTION RESPIRATORY (INHALATION) EVERY 4 HOURS PRN
Status: CANCELLED | OUTPATIENT
Start: 2022-12-13

## 2022-12-13 RX ORDER — ATOVAQUONE 750 MG/5ML
1500 SUSPENSION ORAL DAILY
Status: CANCELLED | OUTPATIENT
Start: 2022-12-14

## 2022-12-13 RX ORDER — METOCLOPRAMIDE HYDROCHLORIDE 5 MG/ML
5 INJECTION INTRAMUSCULAR; INTRAVENOUS EVERY 6 HOURS PRN
Status: CANCELLED | OUTPATIENT
Start: 2022-12-13

## 2022-12-13 RX ORDER — SODIUM CHLORIDE 0.9 % (FLUSH) 0.9 %
10 SYRINGE (ML) INJECTION
Status: CANCELLED | OUTPATIENT
Start: 2022-12-13

## 2022-12-13 RX ORDER — HEPARIN SODIUM 1000 [USP'U]/ML
1000 INJECTION, SOLUTION INTRAVENOUS; SUBCUTANEOUS
Status: CANCELLED | OUTPATIENT
Start: 2022-12-13

## 2022-12-13 RX ORDER — CARVEDILOL 25 MG/1
25 TABLET ORAL 2 TIMES DAILY
Status: CANCELLED | OUTPATIENT
Start: 2022-12-13

## 2022-12-13 RX ORDER — PREDNISONE 5 MG/1
5 TABLET ORAL DAILY
Status: CANCELLED | OUTPATIENT
Start: 2023-01-15

## 2022-12-13 RX ORDER — HEPARIN 100 UNIT/ML
500 SYRINGE INTRAVENOUS
Status: CANCELLED | OUTPATIENT
Start: 2022-12-13

## 2022-12-13 RX ORDER — ONDANSETRON 2 MG/ML
4 INJECTION INTRAMUSCULAR; INTRAVENOUS EVERY 8 HOURS PRN
Status: CANCELLED | OUTPATIENT
Start: 2022-12-13

## 2022-12-13 RX ORDER — PREDNISONE 5 MG/1
15 TABLET ORAL
Status: CANCELLED | OUTPATIENT
Start: 2022-12-14 | End: 2022-12-18

## 2022-12-13 RX ORDER — SUCRALFATE 1 G/1
1 TABLET ORAL 3 TIMES DAILY PRN
Status: CANCELLED | OUTPATIENT
Start: 2022-12-13

## 2022-12-13 RX ORDER — HYDRALAZINE HYDROCHLORIDE 50 MG/1
50 TABLET, FILM COATED ORAL EVERY 8 HOURS
Status: CANCELLED | OUTPATIENT
Start: 2022-12-13

## 2022-12-13 RX ORDER — MAG HYDROX/ALUMINUM HYD/SIMETH 200-200-20
30 SUSPENSION, ORAL (FINAL DOSE FORM) ORAL 4 TIMES DAILY PRN
Status: CANCELLED | OUTPATIENT
Start: 2022-12-13

## 2022-12-13 RX ORDER — SIMETHICONE 80 MG
1 TABLET,CHEWABLE ORAL 4 TIMES DAILY PRN
Status: CANCELLED | OUTPATIENT
Start: 2022-12-13

## 2022-12-13 RX ORDER — MECLIZINE HCL 12.5 MG 12.5 MG/1
25 TABLET ORAL 3 TIMES DAILY PRN
Status: CANCELLED | OUTPATIENT
Start: 2022-12-13

## 2022-12-13 RX ORDER — AMLODIPINE BESYLATE 5 MG/1
5 TABLET ORAL DAILY
Status: CANCELLED | OUTPATIENT
Start: 2022-12-14

## 2022-12-13 RX ORDER — METHOCARBAMOL 500 MG/1
500 TABLET, FILM COATED ORAL 3 TIMES DAILY PRN
Status: CANCELLED | OUTPATIENT
Start: 2022-12-13

## 2022-12-13 RX ORDER — TALC
6 POWDER (GRAM) TOPICAL NIGHTLY PRN
Status: CANCELLED | OUTPATIENT
Start: 2022-12-13

## 2022-12-13 RX ORDER — LEVOTHYROXINE SODIUM 25 UG/1
25 TABLET ORAL
Status: CANCELLED | OUTPATIENT
Start: 2022-12-14

## 2022-12-13 RX ORDER — CLONIDINE HYDROCHLORIDE 0.1 MG/1
0.1 TABLET ORAL EVERY 8 HOURS PRN
Status: CANCELLED | OUTPATIENT
Start: 2022-12-13

## 2022-12-13 RX ORDER — PANTOPRAZOLE SODIUM 40 MG/1
40 TABLET, DELAYED RELEASE ORAL
Status: CANCELLED | OUTPATIENT
Start: 2022-12-14

## 2022-12-13 RX ORDER — PREDNISONE 5 MG/1
10 TABLET ORAL
Status: CANCELLED | OUTPATIENT
Start: 2023-01-01 | End: 2023-01-15

## 2022-12-13 RX ORDER — PROCHLORPERAZINE EDISYLATE 5 MG/ML
5 INJECTION INTRAMUSCULAR; INTRAVENOUS EVERY 6 HOURS PRN
Status: CANCELLED | OUTPATIENT
Start: 2022-12-13

## 2022-12-13 RX ORDER — AMOXICILLIN 250 MG
1 CAPSULE ORAL 2 TIMES DAILY PRN
Status: CANCELLED | OUTPATIENT
Start: 2022-12-13

## 2022-12-13 RX ORDER — BISACODYL 10 MG
10 SUPPOSITORY, RECTAL RECTAL DAILY PRN
Status: CANCELLED | OUTPATIENT
Start: 2022-12-13

## 2022-12-13 RX ORDER — ACETAMINOPHEN 325 MG/1
650 TABLET ORAL EVERY 4 HOURS PRN
Status: CANCELLED | OUTPATIENT
Start: 2022-12-13

## 2022-12-13 RX ADMIN — ATOVAQUONE 1500 MG: 750 SUSPENSION ORAL at 08:12

## 2022-12-13 RX ADMIN — HYDRALAZINE HYDROCHLORIDE 50 MG: 50 TABLET ORAL at 03:12

## 2022-12-13 RX ADMIN — LEVOTHYROXINE SODIUM 25 MCG: 25 TABLET ORAL at 06:12

## 2022-12-13 RX ADMIN — PREDNISONE 15 MG: 5 TABLET ORAL at 11:12

## 2022-12-13 RX ADMIN — WHITE PETROLATUM: 1.75 OINTMENT TOPICAL at 09:12

## 2022-12-13 RX ADMIN — PANTOPRAZOLE SODIUM 40 MG: 40 TABLET, DELAYED RELEASE ORAL at 03:12

## 2022-12-13 RX ADMIN — HYDRALAZINE HYDROCHLORIDE 50 MG: 50 TABLET ORAL at 06:12

## 2022-12-13 RX ADMIN — CARVEDILOL 25 MG: 25 TABLET, FILM COATED ORAL at 08:12

## 2022-12-13 RX ADMIN — AMLODIPINE BESYLATE 5 MG: 5 TABLET ORAL at 08:12

## 2022-12-13 RX ADMIN — PANTOPRAZOLE SODIUM 40 MG: 40 TABLET, DELAYED RELEASE ORAL at 06:12

## 2022-12-13 NOTE — ASSESSMENT & PLAN NOTE
Pulmonary Hypertension due to left heart disease  TTE (10/2022) EF 65%, G1DD  Home meds: Lisinopril, Toprol  - Active volume control with intermittent Hemodialysis per Nephrology   - Daily weights (standing if tolerated)  - Strict I/Os  - Fluid restriction 1.5 day

## 2022-12-13 NOTE — ASSESSMENT & PLAN NOTE
Patient with acute kidney injury likely due to microscopic polyangiitis.  WILFREDO is currently stable. Labs reviewed- Renal function/electrolytes with Estimated Creatinine Clearance: 9.6 mL/min (A) (based on SCr of 4.4 mg/dL (H)). according to latest data. Monitor urine output and serial BMP and adjust therapy as needed. Avoid nephrotoxins and renally dose meds for GFR listed above.   Nephrology following and patient receives HD as indicated.

## 2022-12-13 NOTE — NURSING
Pt in bed awake alert oriented x 3. She denied any acute pain. Remain stable throughout this shift. VSS. Report and care transferred to incoming RN

## 2022-12-13 NOTE — PROGRESS NOTES
Hemodialysis treatment completed.    Treatment time received: 3 hours    Net fluid removed: 0    Tolerated Treatment well. VSS. No acute distress.    Heparin Locked ports per order.    Report given as documented in PER Handoff on Doc Flowsheet  Refer to flowsheet and MAR for details.  Patient transported back in wheelchair from Dialysis to primary unit.

## 2022-12-13 NOTE — ASSESSMENT & PLAN NOTE
Due to microscopic polyangiitis. Remains on hemodialysis intermittently.   - Baseline creatinine 1.0-1.2.   - New onset proteinuria/hematuria.   - Renal biopsy completed   - tunneled HD catheter in place 12/8 for intermittent Hemodialysis  - Nephrology following  - renally dose all medications  - intermittent Hemodialysis as indicated, per Nephrology: pursue outpatient HD; IR consulted for tunneled catheter placement; case management to assist with HD chair.  - patient is not at physical baseline and continues to required intermittent HD with daily nephrologist oversight; needs LTAC for OT/PT and intermittent HD until functional status improves and she is able to transition to intermittent OP HD

## 2022-12-13 NOTE — ASSESSMENT & PLAN NOTE
Patient with acute kidney injury likely due to microscopic polyangiitis.  WILFREDO is currently stable. Labs reviewed- Renal function/electrolytes with Estimated Creatinine Clearance: 4.4 mL/min (A) (based on SCr of 9.6 mg/dL (H)). according to latest data. Monitor urine output and serial BMP and adjust therapy as needed. Avoid nephrotoxins and renally dose meds for GFR listed above.   Nephrology following and patient receives HD as indicated.

## 2022-12-13 NOTE — PLAN OF CARE
Per Kimberley at [facility], patient will go to Room 271 please have nursing call [phone] please have nursing leave lines in.    [facility] transport arranged.     [facility] Facilitated Transportation Request [[id number]] (Order [id number])  Transfer  Date and Time: 12/13/2022  5:11 PM Department: [facility] 14wt Rel By: Rina Larry RN        6:40 PM      CM informed by Dr Concepcion that the patient will need a dose of antibiotics before she leaves for LTAC.  The medication is a one time dose.  CM phoned [facility] [phone] and spoke with Ly.  Per Ly, there is no cut off time.  Ly requests that the bedside nurse call [phone] when the patient is ready to leave.  Transport placed in Will call.  Bedside nurse to call the [facility] center [phone], to take transport out of will call.      6:44 PM  On call CM informed by the [facility] center and charge nurse that the patient is already in transport to the LTAC.   Per Lesley at [facility], they do not have the ability to get the antibiotic tonight.  Per Dr. Concepcion, it is ok to wait until tomorrow AM.  Kimberley with [facility] aware.        Rina Larry RN, CCRN-K, [facility]  Neuro-Critical Care   X [phone]

## 2022-12-13 NOTE — ASSESSMENT & PLAN NOTE
Due to microscopic polyangiitis. Remains on hemodialysis intermittently.   - Baseline creatinine 1.0-1.2.   - New onset proteinuria/hematuria.   - Renal biopsy completed   - tunneled HD catheter in place 12/8 for intermittent Hemodialysis  - Nephrology following  - renally dose all medications  - intermittent Hemodialysis as indicated, per Nephrology: pursue outpatient HD; IR consulted for tunneled catheter placement; case management to assist with HD chair.  - patient is not at physical baseline and continues to require intermittent HD with daily nephrologist oversight; needs LTAC for OT/PT and intermittent HD until functional status improves and she is able to transition to intermittent OP HD

## 2022-12-13 NOTE — ASSESSMENT & PLAN NOTE
Starting having hemoptysis and melena with associated acute blood loss anemia earlier in hospital stay. Patient with known internal hemorrhoids, mild sigmoid diverticulosis, history of gastritis and + H pylori (2012 EGD).   - GI consulted 10/19, unable to perform EGD due to instability and respiratory failure at the time  - PPI PO BID   - Monitor CBC closely for signs of recurrent bleed  - EGD w/no acute bleed seen  - Transfused 2units pRBC  - Follow up with GI as OP

## 2022-12-13 NOTE — PROGRESS NOTES
[facility] - Intensive Care ([place] [id number])  [facility]  Telemedicine Progress Note    Patient Name: Kristin Goodman  MRN: [id number]  Patient Class: IP- Inpatient   Admission Date: 10/17/2022  Length of Stay: 56 days  Attending Physician: Yaquelin Concepcion MD  Primary Care Provider: Lori Hernandez MD    Subjective:     Principal Problem:Microscopic polyangiitis    HPI:  Kristin Goodman is a 77 y.o. female with a past medical history of HTN, hypothyroidism, HFpEF, and osteoarthritis of the arm who has presented to the ED for cough, SOB, and weakness. Daughter is present at the bedside. Patient presented to the ED on 9/24 with hemoptysis, CT and CXR showed high suspicion for multilobar pneumonia. Patient was discharged with a 10-day course of levofloxacin and an albuterol inhaler. Patient followed up with her PCP on 10/4 with slight improvement in symptoms and went back to work. Patient continued to have worsening symptoms and presented to the ED again on 10/14 for evaluation and no interventions were done. Patient endorses fevers over the last few days up to 102.4 F with progressive SOB. She endorses hemoptysis with moderate amount of blood, generalized weakness, productive cough, SOB, and loss of appetite. Denies chest pain, nausea, vomiting, abdominal pain, or urinary changes.    ED: hypertensive up to 219/93 and tachycardic up to 117. Oxygen saturation on 92% on RA, placed on 5L NC with sats >97%. CBC remarkable for leukocytosis of 16.03 and Hb 7.7. K 3.3. Cr 1.6, baseline ~1.0. . Troponin 0.061. COVID and flu negative. EKG NSR. CT chest with contrast pending at time of admission. Given home amlodipine and lisinopril. Given 500mL NS, IV azithromycin, cefepime and vanc.     Overview/Hospital Course:  HM Course:  Ms. Goodman was admitted to Hospital Medicine for management of multifocal pneumonia and acute hypoxemic respiratory failure after presenting to the ED with fevers, cough, hemoptysis, and  dyspnea. She required escalation to the MICU due to abrupt decline in her respiratory status, associated with hemoptysis and requiring NIPPV. CT chest showed bilateral patchy ground glass opacities. Labs additionally were notable for acute renal failure on CKD3. Pulmonology was consulted while under the care of [facility] and felt this picture was consistent with diffuse alveolar hemorrhage.     ICU Course:   Her workup revealed a strongly positive MPO Ab consistent with clinical picture of microscopic polyangiitis with pulmonary and renal involvement. She underwent Trialysis catheter placement 10/25/2022 in the Medical ICU. 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     Rheumatology was consulted, extensive workup revealed MITUL + 1:2560 homogenous, +dsDNA, normal complements, PR3 1.2 (slight +),  (+), cANCA + 1:80, pANCA neg, GBMAb neg, trace cryos. Due to concern for MPA, she was started on pulse dose IV methylprednisolone 1000mg for 3 days, and plasmapheresis under the guidance of Transfusion Medicine. Patient was placed on steroid taper and completed PLEX. She additionally received rituximab and cyclophosphamide. OI prophylaxis was provided with atovaquone due to a sulfa allergy.     Nephrology was consulted for evaluation of acute renal failure in the setting of MPA. She did require SLED in the ICU for renal clearance and remains on intermittent hemodialysis. She is expected to continue HD once discharged.     Her acute hypoxemic respiratory failure improved and she was weaned off of supplemental oxygen. She stepped down to [facility] 10/28.     HM Course:  She underwent MBBS for evaluation of dysphagia, which showed global delayed initiation of swallow  and aspiration with thin liquids. Due to concern for possible prior stroke, head imaging was completed and negative for acute finding. She was NPO with NG tube. ENT was consulted for evaluation of dysphagia and cervical adenopathy.  Patient did not tolerate laryngoscopy; unable to visualize vocal cords but given her lack of hoarseness, they did not suspect vocal fold paresis/paralysis. MRI recommended by rheum to fully rule out any potential neurological cause of the patient's dysphagia; MRI demonstrated remote left thalamic lacunar type infarct and punctate remote left cerebellar infarcts.  She continued to work with speech therapy.  EGD completed 11/14 without abnormalities.  Per GI, Suspect some aspect of esophageal dysmotility in setting of critical illness. No further testing at this time.  Per SLP, diet advanced on 11/15 and NG tube removed.    Her other chronic medical conditions including hypothyroidism, diastolic CHF, and hypertension were managed with her home medications, with dose adjustments as needed.     Patient was noted to have downtrending Hg 11/17- required 1u pRBC. Rheum continued to follow for further steroid dosing. Patient was started on 2g NaCl tab TID for SIADH. SLP recommended mechanical soft diet with thin liquids 11/17. Na normalized 11/18. Hg improved to 8.5 following 1u pRBC. Patient had some hyperkalemia- started on scheduled lokelma. Patient continued to have improved UOP; however, BUN: Cr ratio uptrended. Nephro decided to hang off on HD as patient was having 500-600mL/24hr. He was started on scheduled NaHCO3 as ewll. Patient underwent HD 11/23. Patient had drop in Hg once again- 6.5 on 11/25. Given 1u pRBC (2nd unit). Underwent HD 11/30. Nephrology following to re-evaluate daily for need for HD and permacath placement.       Telemedicine  This service was provided by [facility].    Patient was transferred to [facility] on:  12/03/2022     Chief Complaint   Patient  presents with    Multiple complaints     Seen 2 other times since sept, cont with now fever, cough with blood      The patient location is: [id number]   Admitted 10/17/2022 10:12 AM    Interval History / Events Overnight:   The patient is able to provide adequate history. Additional history was obtained from past medical records. No significant events reported by Nursing. Patient in HD unit today  Patient complains of nothing specific. Symptoms have been unchanged since yesterday. Associated symptoms include: fatigue. Symptoms are stable.     Lab test(s) reviewed: H&H stable    Review of Systems   Constitutional:  Negative for fever.   Respiratory:  Negative for shortness of breath.      Objective:     Vital Signs (Most Recent):  Temp: 97.2 °F (36.2 °C) (12/12/22 1118)  Pulse: (!) 56 (12/12/22 1500)  Resp: 17 (12/12/22 1118)  BP: 136/60 (12/12/22 1500)  SpO2: 97 % (12/12/22 1118)   Vital Signs (24h Range):  Temp:  [96 °F (35.6 °C)-98.3 °F (36.8 °C)] 97.2 °F (36.2 °C)  Pulse:  [56-81] 56  Resp:  [12-18] 17  SpO2:  [93 %-97 %] 97 %  BP: (135-160)/(60-74) 136/60     Weight: 66.3 kg (146 lb 2.6 oz)  Body mass index is 27.62 kg/m².    Intake/Output Summary (Last 24 hours) at 12/12/2022 1512  Last data filed at 12/11/2022 1600  Gross per 24 hour   Intake 300 ml   Output --   Net 300 ml        Physical Exam  Constitutional:       General: She is not in acute distress.  Neck:      Trachea: Phonation normal.   Cardiovascular:      Comments: Monitor / Vital signs reviewed at time of visit  Pulmonary:      Effort: No respiratory distress.   Neurological:      Mental Status: She is alert. She is not disoriented.   Psychiatric:         Attention and Perception: Attention normal.         Speech: Speech normal.         Behavior: Behavior is cooperative.         Thought Content: Thought content normal.       Significant Labs:  Recent Labs   Lab 08/02/22  1156   HGBA1C 5.5       Recent Labs   Lab 12/10/22  0320 12/11/22  0227  12/12/22  0545   WBC 7.26 8.97 8.85   HGB 7.8* 7.5* 8.0*   HCT 25.4* 25.1* 25.3*   * 144* 167       Recent Labs   Lab 12/10/22  0320 12/11/22 0227 12/12/22  0545   GRAN 69.1  5.0 64.7  5.8 60.9  5.4   LYMPH 23.3  1.7 24.5  2.2 28.7  2.5   MONO 5.4  0.4 8.2  0.7 7.2  0.6   EOS 0.0 0.0 0.0       Recent Labs   Lab 12/10/22  0320 12/11/22  0227 12/12/22  0545    140 138   K 5.3* 4.7 4.6    105 103   CO2 20* 20* 21*   BUN 79* 80* 83*   CREATININE 9.1* 9.7* 9.6*   GLU 86 78 78   CALCIUM 7.7* 7.7* 8.0*   MG 1.9 1.9 1.9   PHOS 5.3* 5.4* 5.2*       Recent Labs   Lab 12/07/22  0440   INR 1.1       Recent Labs   Lab 09/24/22  1120 10/14/22  1638 10/17/22  1200 10/23/22  1822 10/30/22  0426 11/25/22  0255   PROCAL 0.06 0.12  --   --   --   --    LACTATE 0.7  --  0.9 0.9  --   --    DDIMER  --  1.96*  --   --   --   --    FERRITIN  --   --   --   --  374* 588*       SARS-CoV2 (COVID-19) Qualitative PCR (no units)   Date Value   10/24/2022 Not Detected   02/14/2022 Not Detected   09/25/2020 Not Detected   05/21/2020 Not Detected     SARS-CoV-2 RNA, Amplification, Qual (no units)   Date Value   10/17/2022 Negative     POC Rapid COVID (no units)   Date Value   09/24/2022 Negative       ECG Results              EKG 12-lead (Final result)  Result time 10/17/22 14:26:15      Final result by Interface, Lab In [facility] (10/17/22 14:26:15)                   Narrative:    Test Reason : R06.02,    Vent. Rate : 093 BPM     Atrial Rate : 093 BPM     P-R Int : 126 ms          QRS Dur : 070 ms      QT Int : 356 ms       P-R-T Axes : 048 052 030 degrees     QTc Int : 442 ms    Normal sinus rhythm  Normal ECG  When compared with ECG of 14-OCT-2022 14:26,  Limb lead reversal has been corrected  Confirmed by Jc Barbosa MD ([id number]) on 10/17/2022 2:26:04 PM    Referred By: [email or web address]   SELF           Confirmed By:Jc Barbosa MD                                    Results for orders placed during the hospital encounter  of 10/17/22    Echo    Interpretation Summary  · The left ventricle is normal in size with normal systolic function. The estimated ejection fraction is 65%.  · Normal right ventricular size with normal right ventricular systolic function.  · Grade I left ventricular diastolic dysfunction.  · Mild tricuspid regurgitation.  · There is pulmonary hypertension. The estimated PA systolic pressure is 56 mmHg.  · Normal to low central venous pressure (3 mmHg).      MRI Brain Without Contrast  Narrative: EXAMINATION:  MRI BRAIN WITHOUT CONTRAST    CLINICAL HISTORY:  Dizziness, non-specific;    TECHNIQUE:  Multiplanar multisequence MR imaging of the brain was performed without contrast.    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  Periventricular and subcortical white matter changes are present likely related to chronic microvascular disease.  Small remote left medial occipital cortical infarct.  There is no evidence of recent or remote hemorrhage.  No extra-axial collections are identified.  No hydrocephalus.  Skull base structures are unremarkable.  Impression: Mild chronic ischemic changes are present, as above.    Electronically signed by: Timothy Bess MD  Date:    12/09/2022  Time:    08:32      Labs and Imaging within the last 24 hours listed above were reviewed.       Diet: Diet renal [facility]; Lactose Restricted  Significant LDAs:   IV Access Type: Peripheral and Dialysis Access  Urinary Catheter Indication if present: Patient Does Not Have Urinary Catheter  Other Lines/Tubes/Drains:    HIGH RISK CONDITION(S):   Patient has a condition that poses threat to life and bodily function: Acute Renal Failure     Goals of Care:    Previous admission:  10/14/22  Likely prognosis:  Fair  Code Status: Full Code  Comfort Only: No  Hospice: No  Goals at discharge: remain at home, with physician follow-up    Discharge Planning   ANTHONY: 12/14/2022     Code  Status: Full Code   Is the patient medically ready for discharge?: Yes    Reason for patient still in hospital (select all that apply): Patient trending condition and Pending disposition  Discharge Plan A: Long-term acute care facility (LTAC)   Discharge Delays: None known at this time      Assessment/Plan:      * Microscopic polyangiitis  As of 10/26/22 strongly positive MPO Ab (in contrast to borderline positive PR3), consistent with clinical picture of microscopic polyangiitis with pulmonary, and renal involvement after presenting with acute hypoxemic respiratory failure, hemoptysis, and acute renal failure.  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   3)  CHANGES SUGGESTIVE OF FOCAL ACUTE PYELONEPHRITIS.   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 Currently on oral steroid taper   - plan for 20mg IV daily on 11/6 for 14 days (last dose of 20mg equivalent 11/20/2022); completing oral Prednisone per PEXIVAS steroid taper  - apheresis: underwent PLEX 10/26, 10/27, 10/30, 11/1, 11/2, 11/3  - rituximab every 7 days for 4 doses: Dose #1: 10/27, #2 11/3, #3 11/10, and #4 given 11/17  - cyclophosphamide every 14 days for 2 doses: 10/27, 11/10  - Opportunistic Infection ppx: atovaquone 1500mg PO daily  - GI bleed prophylaxis: pantoprazole 40mg BID    Acute  cystitis without hematuria  Patient developed dysuria as UOP increased s/p anuria.  UA suspicious for infection and urine culture growing > 100K E.coli  Ceftriaxone ordered 12/11 but patient lost IV access; Cipro started on 12/12 AM but sensitivities showed ESBL and MDRO. Dysuria may be improving - episodes of urination are limited. Consulting ID.    Dizzy spells  Occurring intermittently during hospitalization but also when patient was younger  -associated with nausea, weakness; shifting eye movements present  -meclizine prn  -Neurology evaluation due to recent vasculitis diagnosis; MRI brain ordered 12/8 unremarkable  - Neurology recommends:   -- PT/OT for vestibular therapy   -- OP workup with cardiology and possible tilt table test   -- OP Neurology follow up for BPPV     Anemia due to chronic kidney disease  - due to CKD  - Hg stable  - Monitor trend     Primary pauci-immune necrotizing and crescentic glomerulonephritis  Patient with acute kidney injury likely due to microscopic polyangiitis.  WILFREDO is currently stable. Labs reviewed- Renal function/electrolytes with Estimated Creatinine Clearance: 4.4 mL/min (A) (based on SCr of 9.6 mg/dL (H)). according to latest data. Monitor urine output and serial BMP and adjust therapy as needed. Avoid nephrotoxins and renally dose meds for GFR listed above.   Nephrology following and patient receives HD as indicated.     Gastric reflux  - PPI BID    High risk medications (not anticoagulants) long-term use  See history of vasculitis therapy    Anuria  - Improving urine output  - Nephrology following- re-evaluating daily for need for Intermittent HD; permacath placement on 12/8  - improving    Gastrointestinal hemorrhage with melena  Starting having hemoptysis and melena with associated acute blood loss anemia earlier in hospital stay. Patient with known internal hemorrhoids, mild sigmoid diverticulosis, history of gastritis and + H pylori (2012 EGD).   - GI consulted 10/19,  unable to perform EGD due to instability and respiratory failure at the time  - PPI PO BID   - Monitor CBC closely for signs of recurrent bleed  - EGD w/no acute bleed seen  - Transfused 2units pRBC    Dysphagia  SLP following  MBSS showing global weakness in swallowing as well as aspiration with thin liquids.   ENT consulted but patient did not tolerate laryngoscopy   - Dysphagia possibly due to previous stroke  - Briefly required NGT, worked with SLP  - NGT removed- being treated with nystatin swish and swallow for thrush  - GI consulted as well for concern of oropharyngeal candidiasis;  Low suspicion for esophageal candidiasis without odynophagia however can have if white plaques have been seen on oropharynx, ok to start fluconazole empirically for possible esophageal candidiasis  - EGD on 11/14 without abnormality; per GI, Suspect some aspect of esophageal dysmotility in setting of critical illness. No further testing at this time.  - Advanced to renal diet 11/22    Moderate malnutrition  Nutrition consulted. Most recent weight and BMI monitored-     Malnutrition (Moderate to Severe)  Weight Loss (Malnutrition): 7.5% in 3 months    Measurements:  Wt Readings from Last 1 Encounters:   12/07/22 66.3 kg (146 lb 2.6 oz)   Body mass index is 27.62 kg/m².    Recommendations: Recommendation/Intervention: 1.  Goals: Meet % EEN, EPN by RD f/u date    Patient has been screened and assessed by RD. RD will follow patient.      Acute renal failure on dialysis  Due to microscopic polyangiitis. Remains on hemodialysis intermittently.   - Baseline creatinine 1.0-1.2.   - New onset proteinuria/hematuria.   - Renal biopsy completed   - tunneled HD catheter in place 12/8 for intermittent Hemodialysis  - Nephrology following  - renally dose all medications  - intermittent Hemodialysis as indicated, per Nephrology: pursue outpatient HD; IR consulted for tunneled catheter placement; case management to assist with HD chair.  -  patient is not at physical baseline and continues to required intermittent HD with daily nephrologist oversight; needs LTAC for OT/PT and intermittent HD until functional status improves and she is able to transition to intermittent OP HD    Acute hypoxemic respiratory failure  Multifocal pneumonia  Hemoptysis  Dyspnea  Diffuse Alveolar Hemorrahge  In the setting of a new diagnosis of microscopic polyangiitis, presented with fevers, dyspnea,.  - Chest imaging was consistent with diffuse alveolar hemorrhage.  CT chest w/c 10/17 with JESSICA perihilar dense consolidations w/ peripheral patcy areas of GGO, JESSICA PNA, less c/f cardiogenic pulmonary edema.  - Patient upgraded to Medical ICU 10/23/22 with abrupt respiratory decline requiring NIPPV, improved with high dose IV steroids, plasmapheresis, emergent hemodialysis.   - Unable to perform bronchoscopy in the Medical ICU due to tenuous respiratory status  Hypoxia has resolved  - weaned to room air  - continue management of underlying triggers with steroid and immunosuppressants  - incentive spirometry and respiratory hygiene  - improved    Lymphadenopathy of head and neck  - Has bilateral neck gland swelling with tenderness; consulted ENT  - No surgical intervention indicated   - Adenopathy likely reactive in nature   - Recommend further workup if it does not resolve within next 2 weeks   - 11/30-- Adenopathy is still present especially right submandibular region  - ENT follow up as OP    Hyponatremia  - thought to be due to SIADH initially- received NaCl tabs  - NaCl tabs discontinued  - Na stable     Other specified anemias  In the setting of GI bleed with melena  - Evaluated by GI, see GI bleed documentation  - Last transfusion 10/28, Hgb slowly trending down since then  - Hgb 6.9 on 11/5  - Monitor CBC   - Treat with pRBC transfusion PRN Hgb <7  - Transfused 3 units pRBC    Chronic diastolic heart failure  Pulmonary Hypertension due to left heart disease  TTE (10/2022)  EF 65%, G1DD  Home meds: Lisinopril, Toprol  - Active volume control with intermittent Hemodialysis per Nephrology   - Daily weights (standing if tolerated)  - Strict I/Os  - Fluid restriction 1.5 day    Hyperkalemia  - trial of Lokelma 12/10; HD if persists.  - improved    Primary hypertension  - Patient was unable to tolerate PO mediations, all meds administered via NG tube until removed on 11/15.  - continue to monitor blood pressure trend closely and adjust antihypertensive regimen as clinically indicated and tolerated.  - amLODIPine, carvediloL, hydrALAZINE; doses decreased on 12/4 due to low BP with extreme fatigue and weakness especially on HD days  --continue to monitor and adjust regimen as necessary    Hypothyroid  - Continue Synthroid 25 mcg  - TSH 0.585 10/27/22        Active Hospital Problems    Diagnosis  POA    *Microscopic polyangiitis [M31.7]  Yes    Acute cystitis without hematuria [N30.00]  No    Dizzy spells [R42]  Yes    Anemia due to chronic kidney disease [N18.9, D63.1]  Yes    Anuria [R34]  Yes    High risk medications (not anticoagulants) long-term use [Z79.899]  Not Applicable    Gastric reflux [K21.9]  Yes    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, N05.7]  Yes    Gastrointestinal hemorrhage with melena [K92.1]  No    Dysphagia [R13.10]  Yes    Moderate malnutrition [E44.0]  Yes    Acute renal failure on dialysis [N17.9, Z99.2]  Not Applicable    Acute hypoxemic respiratory failure [J96.01]  Yes    Lymphadenopathy of head and neck [R59.1]  Yes    Hyponatremia [E87.1]  Yes    Other specified anemias [D64.89]  Yes    Chronic diastolic heart failure [I50.32]  Yes    Hyperkalemia [E87.5]  Yes    Primary hypertension [I10]  Yes    Hypothyroid [E03.9]  Yes      Resolved Hospital Problems    Diagnosis Date Resolved POA    Hemoptysis [R04.2] 11/05/2022 Yes    Diffuse pulmonary alveolar hemorrhage [R04.89] 11/05/2022 Yes    Hypernatremia  [E87.0] 11/05/2022 No    Dysuria [R30.0] 11/05/2022 No    Septic shock [A41.9, R65.21] 11/05/2022 Yes    Cervical adenopathy [R59.0] 11/05/2022 No    Acute renal failure superimposed on stage 3 chronic kidney disease [N17.9, N18.30] 11/05/2022 Yes    Sepsis due to pneumonia [J18.9, A41.9] 10/27/2022 Yes    Multifocal pneumonia [J18.9] 11/05/2022 Yes    Pulmonary HTN [I27.20] 11/05/2022 Yes    CKD (chronic kidney disease), stage III [N18.30] 11/05/2022 Yes    Elevated CPK [R74.8] 10/27/2022 Yes     Chronic       Inpatient Medications Prescribed for Management of Current Problems:     Scheduled Meds:    amLODIPine  5 mg Oral Daily    atovaquone  1,500 mg Oral Daily    carvediloL  25 mg Oral BID    epoetin hira (PROCRIT) injection  50 Units/kg Subcutaneous Every Mon, Wed, Fri    hydrALAZINE  50 mg Oral Q8H    levothyroxine  25 mcg Oral Before breakfast    pantoprazole  40 mg Oral BID AC    predniSONE  15 mg Oral with lunch    Followed by    [START ON 12/18/2022] predniSONE  12.5 mg Oral with lunch    Followed by    [START ON 1/1/2023] predniSONE  10 mg Oral with lunch    [START ON 1/15/2023] predniSONE  5 mg Oral Daily    QUEtiapine  12.5 mg Oral QHS    sodium chloride 0.9% flush bag IVPB   Intravenous 1 time in Clinic/HOD    white petrolatum   Topical (Top) Daily     Continuous Infusions:   As Needed: acetaminophen, albuterol-ipratropium, aluminum-magnesium hydroxide-simethicone, bisacodyL, cloNIDine, dextrose 10%, dextrose 10%, heparin (porcine), heparin (porcine), heparin (porcine), heparin (porcine), heparin, porcine (PF), heparin, porcine (PF), meclizine, melatonin, methocarbamoL, metoclopramide HCl, naloxone, ondansetron, prochlorperazine, senna-docusate 8.6-50 mg, simethicone, sodium chloride 0.9%, sodium chloride 0.9%, sodium chloride 0.9%, sodium chloride 0.9%, sodium chloride 0.9%, sodium chloride 0.9%, sodium chloride 0.9%, sucralfate    VTE Risk Mitigation (From admission, onward)          Ordered     heparin (porcine) injection 1,000 Units  As needed (PRN)         12/12/22 0828     heparin (porcine) injection 1,000 Units  As needed (PRN)         11/29/22 0857     heparin (porcine) injection 1,000 Units  As needed (PRN)         11/22/22 0832     heparin, porcine (PF) 100 unit/mL injection flush 500 Units  As needed (PRN)         11/17/22 1343     heparin, porcine (PF) 100 unit/mL injection flush 500 Units  As needed (PRN)         11/10/22 1430     heparin (porcine) injection 1,000 Units  As needed (PRN)         11/09/22 1601     heparin (porcine) injection 1,000 Units  As needed (PRN)         11/08/22 0854     Place sequential compression device  Until discontinued         10/25/22 1731     IP VTE HIGH RISK PATIENT  Once         10/17/22 1354     Reason for No Pharmacological VTE Prophylaxis  Once        Question:  Reasons:  Answer:  Risk of Bleeding    10/17/22 1354              I have completed this tele-visit without the assistance of a telepresenter.    The attending portion of this evaluation, treatment, and documentation was performed per Yaquelin Concepcion MD via Audio Only Consult    Yaquelin Concepcion MD  Department of Hospital Medicine   [facility] - Intensive Care ([place]-)

## 2022-12-13 NOTE — DISCHARGE SUMMARY
[facility] ([place] [address])  [facility]  Telemedicine Discharge Summary      Patient Name: Kristin Goodman  MRN: [id number]  Patient Class: IP- Inpatient  Admission Date: 10/17/2022  Hospital Length of Stay: 57 days  Discharge Date and Time:  12/13/2022 5:40 PM  Attending Physician: Yaquelin Concepcion MD   Discharging Provider: Yaquelin Concepcion MD  Primary Care Provider: Lori Hernandez MD      HPI:   Kristin Goodman is a 77 y.o. female with a past medical history of HTN, hypothyroidism, HFpEF, and osteoarthritis of the arm who has presented to the ED for cough, SOB, and weakness. Daughter is present at the bedside. Patient presented to the ED on 9/24 with hemoptysis, CT and CXR showed high suspicion for multilobar pneumonia. Patient was discharged with a 10-day course of levofloxacin and an albuterol inhaler. Patient followed up with her PCP on 10/4 with slight improvement in symptoms and went back to work. Patient continued to have worsening symptoms and presented to the ED again on 10/14 for evaluation and no interventions were done. Patient endorses fevers over the last few days up to 102.4 F with progressive SOB. She endorses hemoptysis with moderate amount of blood, generalized weakness, productive cough, SOB, and loss of appetite. Denies chest pain, nausea, vomiting, abdominal pain, or urinary changes.    ED: hypertensive up to 219/93 and tachycardic up to 117. Oxygen saturation on 92% on RA, placed on 5L NC with sats >97%. CBC remarkable for leukocytosis of 16.03 and Hb 7.7. K 3.3. Cr 1.6, baseline ~1.0. . Troponin 0.061. COVID and flu negative. EKG NSR. CT chest with contrast pending at time of admission. Given home amlodipine and lisinopril. Given 500mL NS, IV azithromycin, cefepime and vanc.       Procedure(s) (LRB):  EGD (ESOPHAGOGASTRODUODENOSCOPY) (N/A)      Hospital Course:    Course:  Ms. Goodman was admitted to Hospital Medicine for management of multifocal pneumonia and  acute hypoxemic respiratory failure after presenting to the ED with fevers, cough, hemoptysis, and dyspnea. She required escalation to the MICU due to abrupt decline in her respiratory status, associated with hemoptysis and requiring NIPPV. CT chest showed bilateral patchy ground glass opacities. Labs additionally were notable for acute renal failure on CKD3. Pulmonology was consulted while under the care of [facility] and felt this picture was consistent with diffuse alveolar hemorrhage.     ICU Course:   Her workup revealed a strongly positive MPO Ab consistent with clinical picture of microscopic polyangiitis with pulmonary and renal involvement. She underwent Trialysis catheter placement 10/25/2022 in the Medical ICU. 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     Rheumatology was consulted, extensive workup revealed MITUL + 1:2560 homogenous, +dsDNA, normal complements, PR3 1.2 (slight +),  (+), cANCA + 1:80, pANCA neg, GBMAb neg, trace cryos. Due to concern for MPA, she was started on pulse dose IV methylprednisolone 1000mg for 3 days, and plasmapheresis under the guidance of Transfusion Medicine. Patient was placed on steroid taper and completed PLEX. She additionally received rituximab and cyclophosphamide. OI prophylaxis was provided with atovaquone due to a sulfa allergy.     Nephrology was consulted for evaluation of acute renal failure in the setting of MPA. She did require SLED in the ICU for renal clearance and remains on intermittent hemodialysis. She is expected to continue HD once discharged.     Her acute hypoxemic respiratory failure improved and she was weaned off of supplemental oxygen. She stepped down to [facility] 10/28.       Course:  She underwent MBBS for evaluation of dysphagia, which showed global delayed initiation of swallow and aspiration with thin liquids. Due to concern for possible prior stroke, head imaging was completed and negative for acute finding. She was NPO with NG tube. ENT was consulted for evaluation of dysphagia and cervical adenopathy.  Patient did not tolerate laryngoscopy; unable to visualize vocal cords but given her lack of hoarseness, they did not suspect vocal fold paresis/paralysis. MRI recommended by rheum to fully rule out any potential neurological cause of the patient's dysphagia; MRI demonstrated remote left thalamic lacunar type infarct and punctate remote left cerebellar infarcts.  She continued to work with speech therapy.  EGD completed 11/14 without abnormalities.  Per GI, Suspect some aspect of esophageal dysmotility in setting of critical illness. No further testing at this time.  Per SLP, diet advanced on 11/15 and NG tube removed.    Her other chronic medical conditions including hypothyroidism, diastolic CHF, and hypertension were managed with her home medications, with dose adjustments as needed.     Patient was noted to have downtrending Hg 11/17- required 1u pRBC. Rheum continued to follow for further steroid dosing. Patient was started on 2g NaCl tab TID for SIADH. SLP recommended mechanical soft diet with thin liquids 11/17. Na normalized 11/18. Hg improved to 8.5 following 1u pRBC. Patient had some hyperkalemia- started on scheduled lokelma. Patient continued to have improved UOP; however, BUN: Cr ratio uptrended. Nephro decided to hang off on HD as patient was having 500-600mL/24hr. He was started on scheduled NaHCO3 as ewll. Patient underwent HD 11/23. Patient had drop in Hg once again- 6.5 on 11/25. Given 1u pRBC (2nd unit). Underwent HD 11/30. Nephrology following to re-evaluate daily for need for HD and permacath placement.      See Problems listed below for additional details of  this hospital stay.    Goals of Care Treatment Preferences:  Code Status: Full Code      Consults:   Consults (From admission, onward)          Status Ordering Provider     Inpatient consult to Infectious Diseases  Once        Provider:  (Not yet assigned)    Acknowledged KD HO     Inpatient consult to Midline team  Once        Provider:  (Not yet assigned)    Completed KD HO     Inpatient consult to Neurology  Once        Provider:  (Not yet assigned)    Completed ARSALAN RIVAS AFadia     Inpatient consult to Interventional Radiology  Once        Provider:  (Not yet assigned)    Completed EVELYN ARSALAN AFadia     Inpatient virtual consult to Hospital Medicine  Once        Provider:  (Not yet assigned)    Completed VAZQUEZ REED     Inpatient consult to Gastroenterology  Once        Provider:  (Not yet assigned)    Completed BAHFREDERIC RAZ     Inpatient consult to Registered Dietitian/Nutritionist  Once        Provider:  (Not yet assigned)    Completed BAHD RAZ     Inpatient virtual consult to Hospital Medicine  Once        Provider:  (Not yet assigned)    Completed SOCRATES OLMEDO     Inpatient consult to ENT  Once        Provider:  (Not yet assigned)    Completed PHILIP SCANLON     Inpatient consult to Registered Dietitian/Nutritionist  Once        Provider:  (Not yet assigned)    Completed CATRACHO TELLES     [facility]  Once        Provider:  (Not yet assigned)    Completed CATRACHO TELLES     IP consult to Interventional Nephrology  Once        Provider:  (Not yet assigned)    Acknowledged CATRACHO TELLES     Inpatient consult to Interventional Radiology  Once        Provider:  (Not yet assigned)    Completed CATRACHO TELLES     Inpatient consult to Rheumatology  Once        Provider:  (Not yet assigned)    Completed ALTA ROD     Inpatient consult to Critical Care Medicine  Once        Provider:  (Not yet  assigned)    Completed BELA ROSALES     Inpatient consult to Nephrology  Once        Provider:  (Not yet assigned)    Completed SHARMILA NG     Inpatient consult to Infectious Diseases  Once        Provider:  (Not yet assigned)    Completed SHARMILA NG     Inpatient consult to ENT  Once        Provider:  Vandana Lopez MD    Completed NORI PAEZ     Inpatient consult to Gastroenterology  Once        Provider:  Gilson Larry MD    Completed NORI PAEZ     Inpatient consult to Midline team  Once        Provider:  (Not yet assigned)    Completed NORI PAEZ            * Microscopic polyangiitis  As of 10/26/22 strongly positive MPO Ab (in contrast to borderline positive PR3), consistent with clinical picture of microscopic polyangiitis with pulmonary, and renal involvement after presenting with acute hypoxemic respiratory failure, hemoptysis, and acute renal failure.  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   3)  CHANGES SUGGESTIVE OF FOCAL ACUTE PYELONEPHRITIS.   4)  MILD-TO-MODERATE ARTERIONEPHROSCLEROSIS   - Rheumatology consulted, appreciate evaluation and recommendations     - MITUL + 1:2560 homogenous, +dsDNA, normal complements     - PR3 1.2 (slight +),  (+)     - cANCA + 1:80, pANCA neg     - GBMAb neg, trace cryos  - Transfusion Medicine consulted for apheresis; completed  - Nephrology consulted, see separate documentation for WILFREDO  - Steroids:    - s/p IV Solumedrol 1000 mg x3 doses. Currently on oral steroid taper   - plan for 20mg IV daily on 11/6 for 14 days (last dose of 20mg equivalent 11/20/2022); completing oral Prednisone per PEXIVAS steroid taper  - apheresis: underwent  PLEX 10/26, 10/27, 10/30, 11/1, 11/2, 11/3  - rituximab every 7 days for 4 doses: Dose #1: 10/27, #2 11/3, #3 11/10, and #4 given 11/17  - cyclophosphamide every 14 days for 2 doses: 10/27, 11/10  - Opportunistic Infection ppx: atovaquone 1500mg PO daily  - GI bleed prophylaxis: pantoprazole 40mg BID    Acute cystitis without hematuria  Patient developed dysuria as UOP increased s/p anuria.  UA suspicious for infection and urine culture growing > 100K E.coli  Ceftriaxone ordered 12/11 but patient lost IV access; Cipro started on 12/12 AM but sensitivities showed ESBL and MDRO. Dysuria may be improving - episodes of urination are limited.   Consulted ID  Patient left for [facility]AC before ID recommendations could be implemented.  Discussed with ID fellow: recommendations are ertapenem, renally dosed, (3 day course) OR fosfomycin x 1 dose prior to DC.  Fosfomycin was ordered but not administered prior to transport.  Renally dosed ertapenem x 3 days would be equivalent.  Due to patient's clinical improvement despite the lack of effective treatment for her UTI x 1 week, it is reasonable for antibiotic therapy to be delayed until the morning as bringing her back to the hospital tonight would probably pose more risk than benefit.    Dizzy spells  Occurring intermittently during hospitalization but also when patient was younger  -associated with nausea, weakness; shifting eye movements present  -meclizine prn  -Neurology evaluation due to recent vasculitis diagnosis; MRI brain ordered 12/8 unremarkable  - Neurology recommends:   -- PT/OT for vestibular therapy   -- OP workup with cardiology and possible tilt table test   -- OP Neurology follow up for BPPV     Anemia due to chronic kidney disease  - due to CKD  - Hg stable  - Monitor trend     Primary pauci-immune necrotizing and crescentic glomerulonephritis  Patient with acute kidney injury likely due to microscopic polyangiitis.  WILFREDO is currently stable. Labs reviewed- Renal  function/electrolytes with Estimated Creatinine Clearance: 9.6 mL/min (A) (based on SCr of 4.4 mg/dL (H)). according to latest data. Monitor urine output and serial BMP and adjust therapy as needed. Avoid nephrotoxins and renally dose meds for GFR listed above.   Nephrology following and patient receives HD as indicated.     Gastric reflux  - PPI BID    High risk medications (not anticoagulants) long-term use  See history of vasculitis therapy    Anuria  - Improving urine output  - Nephrology following- re-evaluating daily for need for Intermittent HD; permacath placement on 12/8  - improving    Gastrointestinal hemorrhage with melena  Starting having hemoptysis and melena with associated acute blood loss anemia earlier in hospital stay. Patient with known internal hemorrhoids, mild sigmoid diverticulosis, history of gastritis and + H pylori (2012 EGD).   - GI consulted 10/19, unable to perform EGD due to instability and respiratory failure at the time  - PPI PO BID   - Monitor CBC closely for signs of recurrent bleed  - EGD w/no acute bleed seen  - Transfused 2units pRBC  - Follow up with GI as OP    Dysphagia  SLP following  MBSS showing global weakness in swallowing as well as aspiration with thin liquids.   ENT consulted but patient did not tolerate laryngoscopy   - Dysphagia possibly due to previous stroke  - Briefly required NGT, worked with SLP  - NGT removed- being treated with nystatin swish and swallow for thrush  - GI consulted as well for concern of oropharyngeal candidiasis;  Low suspicion for esophageal candidiasis without odynophagia however can have if white plaques have been seen on oropharynx, ok to start fluconazole empirically for possible esophageal candidiasis  - EGD on 11/14 without abnormality; per GI, Suspect some aspect of esophageal dysmotility in setting of critical illness. No further testing at this time.  - Advanced to renal diet 11/22    Moderate malnutrition  Nutrition consulted.  Most recent weight and BMI monitored-     Malnutrition (Moderate to Severe)  Weight Loss (Malnutrition): 7.5% in 3 months    Measurements:  Wt Readings from Last 1 Encounters:   12/07/22 66.3 kg (146 lb 2.6 oz)   Body mass index is 27.62 kg/m².    Recommendations: Recommendation/Intervention: 1.  Goals: Meet % EEN, EPN by RD f/u date    Patient has been screened and assessed by RD. RD will need to follow patient at LTAC.      Acute renal failure on dialysis  Due to microscopic polyangiitis. Remains on hemodialysis intermittently.   - Baseline creatinine 1.0-1.2.   - New onset proteinuria/hematuria.   - Renal biopsy completed   - tunneled HD catheter in place 12/8 for intermittent Hemodialysis  - Nephrology following  - renally dose all medications  - intermittent Hemodialysis as indicated, per Nephrology: pursue outpatient HD; IR consulted for tunneled catheter placement; case management to assist with HD chair.  - patient is not at physical baseline and continues to require intermittent HD with daily nephrologist oversight; needs LTAC for OT/PT and intermittent HD until functional status improves and she is able to transition to intermittent OP HD    Acute hypoxemic respiratory failure  Multifocal pneumonia  Hemoptysis  Dyspnea  Diffuse Alveolar Hemorrahge  In the setting of a new diagnosis of microscopic polyangiitis, presented with fevers, dyspnea,.  - Chest imaging was consistent with diffuse alveolar hemorrhage.  CT chest w/c 10/17 with JESSICA perihilar dense consolidations w/ peripheral patcy areas of GGO, JESSICA PNA, less c/f cardiogenic pulmonary edema.  - Patient upgraded to Medical ICU 10/23/22 with abrupt respiratory decline requiring NIPPV, improved with high dose IV steroids, plasmapheresis, emergent hemodialysis.   - Unable to perform bronchoscopy in the Medical ICU due to tenuous respiratory status  Hypoxia has resolved  - weaned to room air  - continue management of underlying triggers with steroid  and immunosuppressants  - incentive spirometry and respiratory hygiene  - improved    Lymphadenopathy of head and neck  - Has bilateral neck gland swelling with tenderness; consulted ENT  - No surgical intervention indicated   - Adenopathy likely reactive in nature   - Recommend further workup if it does not resolve within next 2 weeks   - 11/30-- Adenopathy is still present especially right submandibular region  - ENT follow up as OP    Hyponatremia  - thought to be due to SIADH initially- received NaCl tabs  - NaCl tabs discontinued  - Na stable     Other specified anemias  In the setting of GI bleed with melena  - Evaluated by GI, see GI bleed documentation  - Last transfusion 10/28, Hgb slowly trending down since then  - Hgb 6.9 on 11/5  - Monitor CBC   - Treat with pRBC transfusion PRN Hgb <7  - Transfused 3 units pRBC    Chronic diastolic heart failure  Pulmonary Hypertension due to left heart disease  TTE (10/2022) EF 65%, G1DD  Home meds: Lisinopril, Toprol  - Active volume control with intermittent Hemodialysis per Nephrology   - Daily weights (standing if tolerated)  - Strict I/Os  - Fluid restriction 1.5 day    Hyperkalemia  - trial of Lokelma 12/10; HD if persists.  - improved    Primary hypertension  - Patient was unable to tolerate PO mediations, all meds administered via NG tube until removed on 11/15.  - continue to monitor blood pressure trend closely and adjust antihypertensive regimen as clinically indicated and tolerated.  - amLODIPine, carvediloL, hydrALAZINE; doses decreased on 12/4 due to low BP with extreme fatigue and weakness especially on HD days  --continue to monitor and adjust regimen as necessary    Hypothyroid  - Continue Synthroid 25 mcg  - TSH 0.585 10/27/22      Final Active Diagnoses:    Diagnosis Date Noted POA    PRINCIPAL PROBLEM:  Microscopic polyangiitis [M31.7] 10/26/2022 Yes    Acute cystitis without hematuria [N30.00] 12/11/2022 No    Dizzy spells [R42] 12/07/2022 Yes     Anemia due to chronic kidney disease [N18.9, D63.1] 12/01/2022 Yes    Anuria [R34] 11/10/2022 Yes    High risk medications (not anticoagulants) long-term use [Z79.899] 11/10/2022 Not Applicable    Gastric reflux [K21.9] 11/10/2022 Yes    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, N05.7] 11/10/2022 Yes    Gastrointestinal hemorrhage with melena [K92.1] 11/05/2022 No    Dysphagia [R13.10] 11/02/2022 Yes    Moderate malnutrition [E44.0] 10/27/2022 Yes    Acute renal failure on dialysis [N17.9, Z99.2] 10/26/2022 Not Applicable    Acute hypoxemic respiratory failure [J96.01] 10/23/2022 Yes    Lymphadenopathy of head and neck [R59.1] 10/21/2022 Yes    Hyponatremia [E87.1] 10/20/2022 Yes    Other specified anemias [D64.89] 10/19/2022 Yes    Chronic diastolic heart failure [I50.32] 08/31/2020 Yes    Hyperkalemia [E87.5] 07/27/2016 Yes    Primary hypertension [I10]  Yes    Hypothyroid [E03.9]  Yes      Problems Resolved During this Admission:    Diagnosis Date Noted Date Resolved POA    Hemoptysis [R04.2] 11/05/2022 11/05/2022 Yes    Diffuse pulmonary alveolar hemorrhage [R04.89] 11/05/2022 11/05/2022 Yes    Hypernatremia [E87.0] 11/01/2022 11/05/2022 No    Dysuria [R30.0] 10/30/2022 11/05/2022 No    Septic shock [A41.9, R65.21] 10/27/2022 11/05/2022 Yes    Cervical adenopathy [R59.0] 10/21/2022 11/05/2022 No    Acute renal failure superimposed on stage 3 chronic kidney disease [N17.9, N18.30] 10/18/2022 11/05/2022 Yes    Sepsis due to pneumonia [J18.9, A41.9] 10/17/2022 10/27/2022 Yes    Multifocal pneumonia [J18.9] 10/17/2022 11/05/2022 Yes    Pulmonary HTN [I27.20] 08/31/2020 11/05/2022 Yes    CKD (chronic kidney disease), stage III [N18.30] 04/27/2019 11/05/2022 Yes    Elevated CPK [R74.8] 04/27/2019 10/27/2022 Yes     Chronic       Discharged Condition: stable    Disposition: Long Term Acute Care    Follow Up: after LTAC    Patient Instructions:   No discharge procedures on file.  Transferred to [facility]    Significant Diagnostic Studies:   Recent Labs   Lab 08/02/22  1156 12/13/22  0847   HGBA1C 5.5 4.5     Recent Labs   Lab 12/10/22  0320 12/11/22  0227 12/12/22  0545   WBC 7.26 8.97 8.85   HGB 7.8* 7.5* 8.0*   HCT 25.4* 25.1* 25.3*   * 144* 167     Recent Labs   Lab 12/10/22  0320 12/11/22  0227 12/12/22  0545   GRAN 69.1  5.0 64.7  5.8 60.9  5.4   LYMPH 23.3  1.7 24.5  2.2 28.7  2.5   MONO 5.4  0.4 8.2  0.7 7.2  0.6   EOS 0.0 0.0 0.0     Recent Labs   Lab 12/11/22  0227 12/12/22  0545 12/13/22  0847    138 136   K 4.7 4.6 3.8    103 102   CO2 20* 21* 20*   BUN 80* 83* 26*   CREATININE 9.7* 9.6* 4.4*   GLU 78 78 71   CALCIUM 7.7* 8.0* 8.0*   MG 1.9 1.9 1.7   PHOS 5.4* 5.2* 3.5     Recent Labs   Lab 12/07/22  0440   INR 1.1     Recent Labs   Lab 09/24/22  1120 10/14/22  1638 10/17/22  1200 10/23/22  1822 10/30/22  0426 11/25/22  0255   PROCAL 0.06 0.12  --   --   --   --    LACTATE 0.7  --  0.9 0.9  --   --    DDIMER  --  1.96*  --   --   --   --    FERRITIN  --   --   --   --  374* 588*     SARS-CoV2 (COVID-19) Qualitative PCR (no units)   Date Value   10/24/2022 Not Detected   02/14/2022 Not Detected   09/25/2020 Not Detected   05/21/2020 Not Detected     SARS-CoV-2 RNA, Amplification, Qual (no units)   Date Value   10/17/2022 Negative     POC Rapid COVID (no units)   Date Value   09/24/2022 Negative       ECG Results              EKG 12-lead (Final result)  Result time 10/17/22 14:26:15      Final result by Interface, Lab In [facility] (10/17/22 14:26:15)                   Narrative:    Test Reason : R06.02,    Vent. Rate : 093 BPM     Atrial Rate : 093 BPM     P-R Int : 126 ms          QRS Dur : 070 ms      QT Int : 356 ms       P-R-T Axes : 048 052 030 degrees     QTc Int : 442 ms    Normal sinus rhythm  Normal ECG  When compared with ECG of 14-OCT-2022 14:26,  Limb lead reversal has been corrected  Confirmed by Jc Barbosa MD ([id number]) on 10/17/2022 2:26:04  PM    Referred By: [email or web address]   SELF           Confirmed By:Jc Barbosa MD                                    Results for orders placed during the hospital encounter of 10/17/22    Echo    Interpretation Summary  · The left ventricle is normal in size with normal systolic function. The estimated ejection fraction is 65%.  · Normal right ventricular size with normal right ventricular systolic function.  · Grade I left ventricular diastolic dysfunction.  · Mild tricuspid regurgitation.  · There is pulmonary hypertension. The estimated PA systolic pressure is 56 mmHg.  · Normal to low central venous pressure (3 mmHg).      MRI Brain Without Contrast  Narrative: EXAMINATION:  MRI BRAIN WITHOUT CONTRAST    CLINICAL HISTORY:  Dizziness, non-specific;    TECHNIQUE:  Multiplanar multisequence MR imaging of the brain was performed without contrast.    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  Periventricular and subcortical white matter changes are present likely related to chronic microvascular disease.  Small remote left medial occipital cortical infarct.  There is no evidence of recent or remote hemorrhage.  No extra-axial collections are identified.  No hydrocephalus.  Skull base structures are unremarkable.  Impression: Mild chronic ischemic changes are present, as above.    Electronically signed by: Timothy Bess MD  Date:    12/09/2022  Time:    08:32       Medications:  Transfer Medications (for Discharge Readmit only):   No current facility-administered medications for this encounter.     No current outpatient medications on file.     Facility-Administered Medications Ordered in Other Encounters   Medication Dose Route Frequency Provider Last Rate Last Admin    acetaminophen tablet 650 mg  650 mg Oral Q4H PRN Yaquelin Concepcino MD        albuterol-ipratropium 2.5 mg-0.5 mg/3 mL nebulizer solution 3 mL  3 mL Nebulization Q4H PRN Yaquelin Concepcion MD         aluminum-magnesium hydroxide-simethicone 200-200-20 mg/5 mL suspension 30 mL  30 mL Oral QID PRN Yaquelin Concepcion MD        [START ON 12/14/2022] amLODIPine tablet 5 mg  5 mg Oral Daily Yaquelin Concepcion MD        [START ON 12/14/2022] atovaquone 750 mg/5 mL oral liquid 1,500 mg  1,500 mg Oral Daily Yaquelin Concepcion MD        bisacodyL suppository 10 mg  10 mg Rectal Daily PRN Yaquelin Concepcion MD        carvediloL tablet 25 mg  25 mg Oral BID Yaquelin Concepcion MD        celecoxib capsule 400 mg  400 mg Oral Once Dieudonne Marshall MD        cloNIDine tablet 0.1 mg  0.1 mg Oral Q8H PRN Yaquelin Concepcion MD        dextrose 10% bolus 125 mL 125 mL  12.5 g Intravenous PRN Yaquelin Concepcion MD        dextrose 10% bolus 250 mL 250 mL  25 g Intravenous PRN Yaquelin Concepcion MD        [START ON 12/14/2022] epoetin hira injection 3,320 Units  50 Units/kg Subcutaneous Every Mon, Wed, Fri Yaquelin Concepcion MD        ertapenem (INVANZ) 0.5 g in sodium chloride 0.9% 100 mL IVPB  0.5 g Intravenous Q24H Kostas Muñoz MD        fentaNYL 50 mcg/mL injection  mcg   mcg Intravenous PRN Dieudonne Marshall MD   100 mcg at 12/21/21 0919    heparin (porcine) injection 1,000 Units  1,000 Units Intra-Catheter PRN Yaquelin Concepcion MD        heparin (porcine) injection 1,000 Units  1,000 Units Intra-Catheter PRN Yaquelin Concepcion MD        heparin (porcine) injection 1,000 Units  1,000 Units Intra-Catheter PRN Yaquelin Concepcion MD        heparin (porcine) injection 1,000 Units  1,000 Units Intra-Catheter PRN Yaquelin Concepcion MD        heparin, porcine (PF) 100 unit/mL injection flush 500 Units  500 Units Intravenous PRN Yaquelin Concepcion MD        heparin, porcine (PF) 100 unit/mL injection flush 500 Units  500 Units Intravenous PRN Yaquelin Concepcion MD        hydrALAZINE tablet 50 mg  50 mg Oral Q8H Yaquelin Concepcion MD        [START ON 12/14/2022] levothyroxine tablet 25 mcg  25 mcg Oral Before breakfast Yaquelin  ALEA Concepcion MD        LIDOcaine (PF) 10 mg/ml (1%) injection 10 mg  1 mL Intradermal Once PRN Dieudonne Marshall MD        LIDOcaine (PF) 10 mg/ml (1%) injection 10 mg  1 mL Intradermal Once Dieudonne Marshall MD        meclizine tablet 25 mg  25 mg Oral TID PRN Yaquelin Concepcion MD        melatonin tablet 6 mg  6 mg Oral Nightly PRN Yaquelin Concepcion MD        methocarbamoL tablet 500 mg  500 mg Oral TID PRN Yaquelin Concepcion MD        metoclopramide HCl injection 5 mg  5 mg Intravenous Q6H PRN Yaquelin Concepcion MD        midazolam (VERSED) 1 mg/mL injection 0.5-4 mg  0.5-4 mg Intravenous PRN Dieudonne Marshall MD   2 mg at 12/21/21 0919    naloxone 0.4 mg/mL injection 0.02 mg  0.02 mg Intravenous PRN Yaquelin Concepcion MD        ondansetron injection 4 mg  4 mg Intravenous Q8H PRN Yaquelin Concepcion MD        [START ON 12/14/2022] pantoprazole EC tablet 40 mg  40 mg Oral BID AC Yaquelin Concepcion MD        [START ON 12/14/2022] predniSONE tablet 15 mg  15 mg Oral with lunch Yaquelin Concepcion MD        Followed by    [START ON 12/18/2022] predniSONE tablet 12.5 mg  12.5 mg Oral with lunch Yaquelin Concepcion MD        Followed by    [START ON 1/1/2023] predniSONE tablet 10 mg  10 mg Oral with lunch Yaquelin Concepcion MD        [START ON 1/15/2023] predniSONE tablet 5 mg  5 mg Oral Daily Yaquelin Concepcion MD        prochlorperazine injection Soln 5 mg  5 mg Intravenous Q6H PRN Yaquelin Concepcion MD        quetiapine split tablet 12.5 mg  12.5 mg Oral QHS Yaquelin Concepcion MD        ropivacaine 0.2% Nimbus PainPRO Pump infusion 500 ML   Perineural Continuous Dieudonne Marshall MD   New Bag at 12/21/21 1153    senna-docusate 8.6-50 mg per tablet 1 tablet  1 tablet Oral BID PRN Yaquelin Concepcion MD        simethicone chewable tablet 80 mg  1 tablet Oral QID PRN Yaquelin Concepcion MD        sodium chloride 0.9% bolus 250 mL 250 mL  250 mL Intravenous PRN Yaquelin Concepcion MD        sodium chloride 0.9% bolus 250 mL 250  mL  250 mL Intravenous PRN Yaquelin Concepcion MD        sodium chloride 0.9% bolus 250 mL 250 mL  250 mL Intravenous PRN Yaquelin Concepcion MD        sodium chloride 0.9% flush 10 mL  10 mL Intravenous PRN Yaquelin Concepcion MD        sodium chloride 0.9% flush 10 mL  10 mL Intravenous PRN Yaquelin Concepcion MD        sodium chloride 0.9% flush 10 mL  10 mL Intravenous PRN Yaquelin Concepcion MD        sodium chloride 0.9% flush 10 mL  10 mL Intravenous PRN Yaquelin Concepcion MD        sucralfate tablet 1 g  1 g Oral TID PRN Yaquelin Concepcion MD        [START ON 12/14/2022] white petrolatum 41 % ointment   Topical (Top) Daily Yaquelin Concepcion MD           Indwelling Lines/Drains at time of discharge:   Lines/Drains/Airways               Hemodialysis Catheter 12/08/22 0802 right subclavian 5 days        Time spent on the discharge of patient: 60 minutes  This service was provided by Virtual Visit.   Patient was seen and examined on the date of discharge.  Additional time was spent speaking with consultants and case management, reviewing records, and/or discussing the plan of care with patient/family.  The patient location is: [id number] A  Admitted 10/17/2022 10:12 AM  Present with the patient at the time of the telemed/virtual assessment: N/A    Patient was transferred to [facility] on:    12/03/2022   This document was prepared by chart review and may not directly reflect my personal knowledge of the patient's case, clinical course, or significant events during the hospital stay.    The attending portion of this evaluation, treatment, and documentation was performed per Yaquelin Concepcion MD via Telemedicine AudioVisual using the secure Vidyo software platform with 2 way audio/video. The provider was located off-site and the patient is located in the hospital. The aforementioned video software was utilized to document the relevant history and physical exam. Secure Exhibia software platform was used  instead of Erum for this visit.      Yaquelin Concepcion MD  Department of Hospital Medicine  [facility] - Intensive Care ([place])

## 2022-12-13 NOTE — SUBJECTIVE & OBJECTIVE
Telemedicine  This service was provided by [facility].    Patient was transferred to [facility] on:  12/03/2022     Chief Complaint   Patient presents with    Multiple complaints     Seen 2 other times since sept, cont with now fever, cough with blood      The patient location is: [id number] A   Admitted 10/17/2022 10:12 AM    Interval History / Events Overnight:   The patient is able to provide adequate history. Additional history was obtained from past medical records. No significant events reported by Nursing.  Patient complains of nothing specific. Symptoms have been unchanged since yesterday. Associated symptoms include: fatigue. Symptoms are stable.     Lab test(s) reviewed: H&H stable    Review of Systems   Constitutional:  Negative for fever.   Respiratory:  Negative for shortness of breath.      Objective:     Vital Signs (Most Recent):  Temp: 96.1 °F (35.6 °C) (12/13/22 0839)  Pulse: 64 (12/13/22 0839)  Resp: 18 (12/13/22 0839)  BP: 136/63 (12/13/22 0839)  SpO2: 99 % (12/13/22 0839)   Vital Signs (24h Range):  Temp:  [96.1 °F (35.6 °C)-97.7 °F (36.5 °C)] 96.1 °F (35.6 °C)  Pulse:  [53-76] 64  Resp:  [16-18] 18  SpO2:  [97 %-99 %] 99 %  BP: (117-170)/(56-84) 136/63     Weight: 66.3 kg (146 lb 2.6 oz)  Body mass index is 27.62 kg/m².    Intake/Output Summary (Last 24 hours) at 12/13/2022 1031  Last data filed at 12/12/2022 1900  Gross per 24 hour   Intake 600.12 ml   Output 600 ml   Net 0.12 ml        Physical Exam  Constitutional:       General: She is not in acute distress.  Neck:      Trachea: Phonation normal.   Cardiovascular:      Comments: Monitor / Vital signs reviewed at time of visit  Pulmonary:      Effort: No respiratory distress.   Neurological:      Mental Status: She is alert. She is not disoriented.   Psychiatric:         Attention and Perception: Attention normal.         Speech: Speech normal.         Behavior: Behavior is cooperative.         Thought Content: Thought  content normal.       Significant Labs:  Recent Labs   Lab 08/02/22  1156 12/13/22  0847   HGBA1C 5.5 4.5       Recent Labs   Lab 12/10/22  0320 12/11/22  0227 12/12/22  0545   WBC 7.26 8.97 8.85   HGB 7.8* 7.5* 8.0*   HCT 25.4* 25.1* 25.3*   * 144* 167       Recent Labs   Lab 12/10/22  0320 12/11/22  0227 12/12/22  0545   GRAN 69.1  5.0 64.7  5.8 60.9  5.4   LYMPH 23.3  1.7 24.5  2.2 28.7  2.5   MONO 5.4  0.4 8.2  0.7 7.2  0.6   EOS 0.0 0.0 0.0       Recent Labs   Lab 12/11/22  0227 12/12/22  0545 12/13/22  0847    138 136   K 4.7 4.6 3.8    103 102   CO2 20* 21* 20*   BUN 80* 83* 26*   CREATININE 9.7* 9.6* 4.4*   GLU 78 78 71   CALCIUM 7.7* 8.0* 8.0*   MG 1.9 1.9 1.7   PHOS 5.4* 5.2* 3.5       Recent Labs   Lab 12/07/22  0440   INR 1.1       Recent Labs   Lab 09/24/22  1120 10/14/22  1638 10/17/22  1200 10/23/22  1822 10/30/22  0426 11/25/22  0255   PROCAL 0.06 0.12  --   --   --   --    LACTATE 0.7  --  0.9 0.9  --   --    DDIMER  --  1.96*  --   --   --   --    FERRITIN  --   --   --   --  374* 588*       SARS-CoV2 (COVID-19) Qualitative PCR (no units)   Date Value   10/24/2022 Not Detected   02/14/2022 Not Detected   09/25/2020 Not Detected   05/21/2020 Not Detected     SARS-CoV-2 RNA, Amplification, Qual (no units)   Date Value   10/17/2022 Negative     POC Rapid COVID (no units)   Date Value   09/24/2022 Negative       ECG Results              EKG 12-lead (Final result)  Result time 10/17/22 14:26:15      Final result by Interface, Lab In [facility] (10/17/22 14:26:15)                   Narrative:    Test Reason : R06.02,    Vent. Rate : 093 BPM     Atrial Rate : 093 BPM     P-R Int : 126 ms          QRS Dur : 070 ms      QT Int : 356 ms       P-R-T Axes : 048 052 030 degrees     QTc Int : 442 ms    Normal sinus rhythm  Normal ECG  When compared with ECG of 14-OCT-2022 14:26,  Limb lead reversal has been corrected  Confirmed by Jc Barbosa MD ([id number]) on 10/17/2022 2:26:04  PM    Referred By: SAYRA   SELF           Confirmed By:Jc Barbosa MD                                    Results for orders placed during the hospital encounter of 10/17/22    Echo    Interpretation Summary  · The left ventricle is normal in size with normal systolic function. The estimated ejection fraction is 65%.  · Normal right ventricular size with normal right ventricular systolic function.  · Grade I left ventricular diastolic dysfunction.  · Mild tricuspid regurgitation.  · There is pulmonary hypertension. The estimated PA systolic pressure is 56 mmHg.  · Normal to low central venous pressure (3 mmHg).      MRI Brain Without Contrast  Narrative: EXAMINATION:  MRI BRAIN WITHOUT CONTRAST    CLINICAL HISTORY:  Dizziness, non-specific;    TECHNIQUE:  Multiplanar multisequence MR imaging of the brain was performed without contrast.    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  Periventricular and subcortical white matter changes are present likely related to chronic microvascular disease.  Small remote left medial occipital cortical infarct.  There is no evidence of recent or remote hemorrhage.  No extra-axial collections are identified.  No hydrocephalus.  Skull base structures are unremarkable.  Impression: Mild chronic ischemic changes are present, as above.    Electronically signed by: Timothy Bess MD  Date:    12/09/2022  Time:    08:32      Labs and Imaging within the last 24 hours listed above were reviewed.       Diet: Diet renal [facility]; Lactose Restricted  Significant LDAs:   IV Access Type: Peripheral and Dialysis Access  Urinary Catheter Indication if present: Patient Does Not Have Urinary Catheter  Other Lines/Tubes/Drains:    HIGH RISK CONDITION(S):   Patient has a condition that poses threat to life and bodily function: Acute Renal Failure     Goals of Care:    Previous admission:  10/14/22  Likely prognosis:   Fair  Code Status: Full Code  Comfort Only: No  Hospice: No  Goals at discharge: remain at home, with physician follow-up    Discharge Planning   ANTHONY: 12/13/2022     Code Status: Full Code   Is the patient medically ready for discharge?: Yes    Reason for patient still in hospital (select all that apply): Patient trending condition and Pending disposition  Discharge Plan A: Long-term acute care facility (LTAC)   Discharge Delays: None known at this time

## 2022-12-13 NOTE — PT/OT/SLP PROGRESS
"Occupational Therapy   Treatment    Name: Kristin Goodman  MRN: [id number]  Admitting Diagnosis:  Microscopic polyangiitis  29 Days Post-Op    Recommendations:     Discharge Recommendations: nursing facility, skilled  Discharge Equipment Recommendations:  walker, rolling  Barriers to discharge:  None    Assessment:     Kristin Goodman is a 77 y.o. female with a medical diagnosis of Microscopic polyangiitis. Performance deficits affecting function are weakness, impaired endurance, impaired functional mobility, gait instability, impaired balance, decreased lower extremity function, edema.     Pt tolerated Tx session well. Pt pleasant and participatory throughout session. Pt demo'd good understanding of HEP and demonstrating improved funct'l mobility and increase tolerance for therapy. Pt will likely cont to benefit from skilled OT services to maximize funct'l indep, increase safety with funct'l mobility and decrease burden of care on caregiver(s).    Rehab Prognosis:  Good; patient would benefit from acute skilled OT services to address these deficits and reach maximum level of function.       Plan:     Patient to be seen 3 x/week to address the above listed problems via self-care/home management, therapeutic activities, therapeutic exercises, neuromuscular re-education  Plan of Care Expires: 12/29/22  Plan of Care Reviewed with: patient    Subjective   "Just a little dizzy." (Pt reporting after 2nd sit>stand t/f)    Pain/Comfort:  Pain Rating 1: 0/10  Pain Rating Post-Intervention 1: 0/10    Objective:     Communicated with: Nurse Clavert prior to session.  Patient found up in chair with telemetry and daughter present upon OT entry to room.    General Precautions: Standard, fall    Orthopedic Precautions:N/A  Braces: N/A  Respiratory Status: Room air     Occupational Performance:     Functional Mobility/Transfers:  Patient completed 2x Sit <> Stand Transfer with minimum assistance  with  rolling walker and verbal cues for " controlled descent    Activities of Daily Living:  Grooming: stand by assistance in stance with RW for simple facial g/h SetupA RUE      [facility] 6 Click ADL: 18    Treatment & Education:  -Pt re ed role of OT per POC  -Pt re ed benefits of UIC  -Pt re ed on BUE theraputty (yellow) HEP  -Pt performed BUE theraputty (yellow) HEP 1x10     Patient left up in chair with call button in reach, daughter present, needs in reach and in NAD    GOALS:   Multidisciplinary Problems       Occupational Therapy Goals          Problem: Occupational Therapy    Goal Priority Disciplines Outcome Interventions   Occupational Therapy Goal     OT, PT/OT Ongoing, Progressing    Description: Goals to be met by: 12/15     Patient will increase functional independence with ADLs by performing:    UE Dressing with Modified [place].  LE Dressing with Modified [place].  Grooming while standing with Modified [place].  Toileting from toilet with Modified [place] for hygiene and clothing management.   Supine to sit with Modified [place].  Step transfer with Modified [place]  Toilet transfer to toilet with Modified [place].  Pt will demonstrate independence with theraputty HEP.                         Time Tracking:     OT Date of Treatment: 12/13/22  OT Start Time: 0903  OT Stop Time: 0932  OT Total Time (min): 29 min    Billable Minutes:Therapeutic Activity 14  Therapeutic Exercise 15    OT/SARAHI: SARAHI     SARAHI Visit Number: 1    12/13/2022

## 2022-12-13 NOTE — PLAN OF CARE
WYATT received a call from Yasmin manzano/ [facility] [phone] regarding insurance auth for LTAC placement.  Yasmin advised SW clinicals sent to MRU for review.  Medical Team notified.    MD from [facility] will contact Dr. Concepcion. P2P scheduled for 12/13/2022 between 11:30a.m.-3:30p.m...       12/12/22 0233   Post-Acute Status   Post-Acute Authorization Placement   Post-Acute Placement Status Pending payor review/awaiting authorization (if required)   Diaylsis Status Referrals Sent   Discharge Delays None known at this time   Discharge Plan   Discharge Plan A Long-term acute care facility (LTAC)   Discharge Plan B Skilled Nursing Facility       Indigo Brown LMSW  PRN-  [facility]  Ext. [phone]

## 2022-12-13 NOTE — ASSESSMENT & PLAN NOTE
Patient developed dysuria as UOP increased s/p anuria.  UA suspicious for infection and urine culture growing > 100K E.coli  Ceftriaxone ordered 12/11 but patient lost IV access; Cipro started on 12/12 AM but sensitivities showed ESBL and MDRO. Dysuria may be improving - episodes of urination are limited.   Consulted ID  Patient left for OLTAC before ID recommendations could be implemented.  Discussed with ID fellow: recommendations are ertapenem, renally dosed, (3 day course) OR fosfomycin x 1 dose prior to DC.  Fosfomycin was ordered but not administered prior to transport.  Renally dosed ertapenem x 3 days would be equivalent.  Due to patient's clinical improvement despite the lack of effective treatment for her UTI x 1 week, it is reasonable for antibiotic therapy to be delayed until the morning as bringing her back to the hospital tonight would probably pose more risk than benefit.

## 2022-12-13 NOTE — ASSESSMENT & PLAN NOTE
Patient developed dysuria as UOP increased s/p anuria.  UA suspicious for infection and urine culture growing > 100K E.coli  Ceftriaxone ordered 12/11 but patient lost IV access; Cipro started on 12/12 AM but sensitivities showed ESBL and MDRO. Dysuria may be improving - episodes of urination are limited. Consulting ID.

## 2022-12-13 NOTE — PLAN OF CARE
Escalated LTAC [id number] request to patient's payor for expedited review so as not to delay discharge awaiting review in the later part of the time frame provided by payor.    Kalli Cotter, MSW, LCSW   - Case

## 2022-12-13 NOTE — ASSESSMENT & PLAN NOTE
Nutrition consulted. Most recent weight and BMI monitored-     Malnutrition (Moderate to Severe)  Weight Loss (Malnutrition): 7.5% in 3 months    Measurements:  Wt Readings from Last 1 Encounters:   12/07/22 66.3 kg (146 lb 2.6 oz)   Body mass index is 27.62 kg/m².    Recommendations: Recommendation/Intervention: 1.  Goals: Meet % EEN, EPN by RD f/u date    Patient has been screened and assessed by RD. RD will need to follow patient at LTAC.
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
## 2022-12-14 NOTE — ASSESSMENT & PLAN NOTE
Simple/uncomplciated cystitis with first time occurrence of ESBL E coli on urine culture form 12/09/22, no flank pain or systemic symptoms, dysuria improving.       Recommendations    1. Single dose of oral fosfomycin 3 g to be given once since the patient will be getting discharged tonight to [facility]. Discussed with patient and primary team.
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
## 2023-01-01 ENCOUNTER — HOSPITAL ENCOUNTER (EMERGENCY)
Facility: HOSPITAL | Age: 78
Discharge: HOME | End: 2023-01-01
Attending: EMERGENCY MEDICINE
Payer: MEDICARE

## 2023-01-01 VITALS
HEART RATE: 72 BPM | WEIGHT: 140 LBS | TEMPERATURE: 98 F | OXYGEN SATURATION: 100 % | DIASTOLIC BLOOD PRESSURE: 64 MMHG | RESPIRATION RATE: 18 BRPM | SYSTOLIC BLOOD PRESSURE: 136 MMHG | BODY MASS INDEX: 26.45 KG/M2

## 2023-01-01 DIAGNOSIS — S00.03XA SCALP HEMATOMA, INITIAL ENCOUNTER: Primary | ICD-10-CM

## 2023-01-01 PROCEDURE — 99284 EMERGENCY DEPT VISIT MOD MDM: CPT | Mod: 25

## 2023-01-01 PROCEDURE — 99283 PR EMERGENCY DEPT VISIT,LEVEL III: ICD-10-PCS | Mod: ,,, | Performed by: EMERGENCY MEDICINE

## 2023-01-01 PROCEDURE — 25000003 PHARM REV CODE 250: Performed by: EMERGENCY MEDICINE

## 2023-01-01 PROCEDURE — 99283 EMERGENCY DEPT VISIT LOW MDM: CPT | Mod: ,,, | Performed by: EMERGENCY MEDICINE

## 2023-01-01 RX ORDER — ACETAMINOPHEN 500 MG
1000 TABLET ORAL
Status: COMPLETED | OUTPATIENT
Start: 2023-01-01 | End: 2023-01-01

## 2023-01-01 RX ADMIN — ACETAMINOPHEN 1000 MG: 500 TABLET ORAL at 02:01
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
## 2023-01-01 NOTE — ED NOTES
Adult Physical Assessment  LOC: Kristin Goodman, 77 y.o. female verified via two identifiers.  The patient is awake, alert, oriented and speaking appropriately at this time.  APPEARANCE: Patient resting comfortably and appears to be in no acute distress at this time. Patient is clean and well groomed, patient's clothing is properly fastened.  SKIN:The skin is warm and dry, color consistent with ethnicity, patient has normal skin turgor and moist mucus membranes, skin intact, no breakdown or brusing noted. Hematoma noted to her forehead.   MUSCULOSKELETAL: Patient moving all extremities well, no obvious swelling or deformities noted.  RESPIRATORY: Airway is open and patent, respirations are spontaneous, patient has a normal effort and rate, no accessory muscle use noted.  CARDIAC: Patient has a normal rate and rhythm, no periphreal edema noted in any extremity, capillary refill < 3 seconds in all extremities  ABDOMEN: Soft and non tender to palpation, no abdominal distention noted. Bowel sounds present in all four quadrants.  NEUROLOGIC: Eyes open spontaneously, behavior appropriate to situation, follows commands, facial expression symmetrical, bilateral hand grasp equal and even, purposeful motor response noted, normal sensation in all extremities when touched with a finger.

## 2023-01-01 NOTE — DISCHARGE INSTRUCTIONS
Your x-ray shows a bruised here forehead, the inside your brain looks normal.  Continue Tylenol as needed for pain.  Rest, ice, elevate your head.

## 2023-01-09 ENCOUNTER — HOSPITAL ENCOUNTER (INPATIENT)
Facility: HOSPITAL | Age: 78
LOS: 25 days | Discharge: HOME HEALTH | DRG: 252 | End: 2023-02-03
Attending: EMERGENCY MEDICINE | Admitting: INTERNAL MEDICINE
Payer: MEDICARE

## 2023-01-09 DIAGNOSIS — I77.6 PAUCI-IMMUNE VASCULITIS: ICD-10-CM

## 2023-01-09 DIAGNOSIS — R78.81 BACTEREMIA DUE TO ENTEROCOCCUS: ICD-10-CM

## 2023-01-09 DIAGNOSIS — N05.7 PRIMARY PAUCI-IMMUNE NECROTIZING AND CRESCENTIC GLOMERULONEPHRITIS: ICD-10-CM

## 2023-01-09 DIAGNOSIS — Z16.12 URINARY TRACT INFECTION DUE TO EXTENDED-SPECTRUM BETA LACTAMASE (ESBL) PRODUCING ESCHERICHIA COLI: ICD-10-CM

## 2023-01-09 DIAGNOSIS — N17.9 AKI (ACUTE KIDNEY INJURY): ICD-10-CM

## 2023-01-09 DIAGNOSIS — R78.81 BACTEREMIA: ICD-10-CM

## 2023-01-09 DIAGNOSIS — N17.9 ACUTE RENAL FAILURE ON DIALYSIS: Primary | ICD-10-CM

## 2023-01-09 DIAGNOSIS — R55 SYNCOPE: ICD-10-CM

## 2023-01-09 DIAGNOSIS — B99.9 INFECTION: ICD-10-CM

## 2023-01-09 DIAGNOSIS — M31.7 MICROSCOPIC POLYANGIITIS: ICD-10-CM

## 2023-01-09 DIAGNOSIS — N17.0 ACUTE RENAL FAILURE WITH TUBULAR NECROSIS: ICD-10-CM

## 2023-01-09 DIAGNOSIS — S83.241D ACUTE MEDIAL MENISCUS TEAR OF RIGHT KNEE, SUBSEQUENT ENCOUNTER: ICD-10-CM

## 2023-01-09 DIAGNOSIS — B96.29 URINARY TRACT INFECTION DUE TO EXTENDED-SPECTRUM BETA LACTAMASE (ESBL) PRODUCING ESCHERICHIA COLI: ICD-10-CM

## 2023-01-09 DIAGNOSIS — N39.0 URINARY TRACT INFECTION DUE TO EXTENDED-SPECTRUM BETA LACTAMASE (ESBL) PRODUCING ESCHERICHIA COLI: ICD-10-CM

## 2023-01-09 DIAGNOSIS — B95.2 BACTEREMIA DUE TO ENTEROCOCCUS: ICD-10-CM

## 2023-01-09 DIAGNOSIS — N05.8 PRIMARY PAUCI-IMMUNE NECROTIZING AND CRESCENTIC GLOMERULONEPHRITIS: ICD-10-CM

## 2023-01-09 DIAGNOSIS — E03.9 ACQUIRED HYPOTHYROIDISM: ICD-10-CM

## 2023-01-09 DIAGNOSIS — N17.9 ENCOUNTER FOR CONTINUOUS RENAL REPLACEMENT THERAPY (CRRT) FOR ACUTE RENAL FAILURE: ICD-10-CM

## 2023-01-09 DIAGNOSIS — M94.261 CHONDROMALACIA OF RIGHT KNEE: ICD-10-CM

## 2023-01-09 DIAGNOSIS — Z99.2 ACUTE RENAL FAILURE ON DIALYSIS: Primary | ICD-10-CM

## 2023-01-09 PROBLEM — E87.1 HYPONATREMIA: Status: RESOLVED | Noted: 2022-10-20 | Resolved: 2023-01-09

## 2023-01-09 PROBLEM — J96.01 ACUTE HYPOXEMIC RESPIRATORY FAILURE: Status: RESOLVED | Noted: 2022-10-23 | Resolved: 2023-01-09

## 2023-01-09 PROBLEM — K92.1 GASTROINTESTINAL HEMORRHAGE WITH MELENA: Status: RESOLVED | Noted: 2022-11-05 | Resolved: 2023-01-09

## 2023-01-09 LAB
ABO + RH BLD: NORMAL
ALBUMIN SERPL BCP-MCNC: 2.6 G/DL (ref 3.5–5.2)
ALP SERPL-CCNC: 80 U/L (ref 55–135)
ALT SERPL W/O P-5'-P-CCNC: 8 U/L (ref 10–44)
ANION GAP SERPL CALC-SCNC: 18 MMOL/L (ref 8–16)
ANISOCYTOSIS BLD QL SMEAR: SLIGHT
AST SERPL-CCNC: 23 U/L (ref 10–40)
BASOPHILS # BLD AUTO: ABNORMAL K/UL (ref 0–0.2)
BASOPHILS NFR BLD: 0 % (ref 0–1.9)
BILIRUB SERPL-MCNC: 0.3 MG/DL (ref 0.1–1)
BLD GP AB SCN CELLS X3 SERPL QL: NORMAL
BUN SERPL-MCNC: 59 MG/DL (ref 8–23)
CALCIUM SERPL-MCNC: 8.5 MG/DL (ref 8.7–10.5)
CHLORIDE SERPL-SCNC: 100 MMOL/L (ref 95–110)
CO2 SERPL-SCNC: 22 MMOL/L (ref 23–29)
CREAT SERPL-MCNC: 6.6 MG/DL (ref 0.5–1.4)
DIFFERENTIAL METHOD: ABNORMAL
EOSINOPHIL # BLD AUTO: ABNORMAL K/UL (ref 0–0.5)
EOSINOPHIL NFR BLD: 0 % (ref 0–8)
ERYTHROCYTE [DISTWIDTH] IN BLOOD BY AUTOMATED COUNT: 16.7 % (ref 11.5–14.5)
EST. GFR  (NO RACE VARIABLE): 6.1 ML/MIN/1.73 M^2
GLUCOSE SERPL-MCNC: 123 MG/DL (ref 70–110)
HCT VFR BLD AUTO: 25.8 % (ref 37–48.5)
HGB BLD-MCNC: 7.7 G/DL (ref 12–16)
HYPOCHROMIA BLD QL SMEAR: ABNORMAL
IMM GRANULOCYTES # BLD AUTO: ABNORMAL K/UL (ref 0–0.04)
IMM GRANULOCYTES NFR BLD AUTO: ABNORMAL % (ref 0–0.5)
LACTATE SERPL-SCNC: 1.4 MMOL/L (ref 0.5–2.2)
LYMPHOCYTES # BLD AUTO: ABNORMAL K/UL (ref 1–4.8)
LYMPHOCYTES NFR BLD: 9 % (ref 18–48)
MCH RBC QN AUTO: 27.1 PG (ref 27–31)
MCHC RBC AUTO-ENTMCNC: 29.8 G/DL (ref 32–36)
MCV RBC AUTO: 91 FL (ref 82–98)
METAMYELOCYTES NFR BLD MANUAL: 1 %
MONOCYTES # BLD AUTO: ABNORMAL K/UL (ref 0.3–1)
MONOCYTES NFR BLD: 4 % (ref 4–15)
MYELOCYTES NFR BLD MANUAL: 1 %
NEUTROPHILS NFR BLD: 82 % (ref 38–73)
NEUTS BAND NFR BLD MANUAL: 3 %
NRBC BLD-RTO: 1 /100 WBC
OVALOCYTES BLD QL SMEAR: ABNORMAL
PLATELET # BLD AUTO: 241 K/UL (ref 150–450)
PLATELET BLD QL SMEAR: ABNORMAL
PMV BLD AUTO: 10.3 FL (ref 9.2–12.9)
POIKILOCYTOSIS BLD QL SMEAR: SLIGHT
POLYCHROMASIA BLD QL SMEAR: ABNORMAL
POTASSIUM SERPL-SCNC: 4.1 MMOL/L (ref 3.5–5.1)
PROT SERPL-MCNC: 6.2 G/DL (ref 6–8.4)
RBC # BLD AUTO: 2.84 M/UL (ref 4–5.4)
SODIUM SERPL-SCNC: 140 MMOL/L (ref 136–145)
TARGETS BLD QL SMEAR: ABNORMAL
WBC # BLD AUTO: 17.47 K/UL (ref 3.9–12.7)

## 2023-01-09 PROCEDURE — 85027 COMPLETE CBC AUTOMATED: CPT | Mod: 91 | Performed by: EMERGENCY MEDICINE

## 2023-01-09 PROCEDURE — 85007 BL SMEAR W/DIFF WBC COUNT: CPT | Performed by: EMERGENCY MEDICINE

## 2023-01-09 PROCEDURE — 99285 EMERGENCY DEPT VISIT HI MDM: CPT | Mod: ,,, | Performed by: EMERGENCY MEDICINE

## 2023-01-09 PROCEDURE — 87040 BLOOD CULTURE FOR BACTERIA: CPT | Mod: 59 | Performed by: HOSPITALIST

## 2023-01-09 PROCEDURE — 12000002 HC ACUTE/MED SURGE SEMI-PRIVATE ROOM

## 2023-01-09 PROCEDURE — 86850 RBC ANTIBODY SCREEN: CPT | Performed by: EMERGENCY MEDICINE

## 2023-01-09 PROCEDURE — 86900 BLOOD TYPING SEROLOGIC ABO: CPT | Performed by: EMERGENCY MEDICINE

## 2023-01-09 PROCEDURE — 93005 ELECTROCARDIOGRAM TRACING: CPT

## 2023-01-09 PROCEDURE — 83605 ASSAY OF LACTIC ACID: CPT | Performed by: EMERGENCY MEDICINE

## 2023-01-09 PROCEDURE — 99285 EMERGENCY DEPT VISIT HI MDM: CPT | Mod: 25

## 2023-01-09 PROCEDURE — 87077 CULTURE AEROBIC IDENTIFY: CPT | Performed by: HOSPITALIST

## 2023-01-09 PROCEDURE — 87186 SC STD MICRODIL/AGAR DIL: CPT | Performed by: HOSPITALIST

## 2023-01-09 PROCEDURE — 99285 PR EMERGENCY DEPT VISIT,LEVEL V: ICD-10-PCS | Mod: ,,, | Performed by: EMERGENCY MEDICINE

## 2023-01-09 PROCEDURE — 80053 COMPREHEN METABOLIC PANEL: CPT | Mod: 91 | Performed by: EMERGENCY MEDICINE

## 2023-01-09 PROCEDURE — 99223 PR INITIAL HOSPITAL CARE,LEVL III: ICD-10-PCS | Mod: ,,, | Performed by: HOSPITALIST

## 2023-01-09 PROCEDURE — 93010 EKG 12-LEAD: ICD-10-PCS | Mod: ,,, | Performed by: INTERNAL MEDICINE

## 2023-01-09 PROCEDURE — 93010 ELECTROCARDIOGRAM REPORT: CPT | Mod: ,,, | Performed by: INTERNAL MEDICINE

## 2023-01-09 PROCEDURE — 99223 1ST HOSP IP/OBS HIGH 75: CPT | Mod: ,,, | Performed by: HOSPITALIST

## 2023-01-09 RX ORDER — ACETAMINOPHEN 325 MG/1
650 TABLET ORAL EVERY 4 HOURS PRN
Status: DISCONTINUED | OUTPATIENT
Start: 2023-01-09 | End: 2023-02-03 | Stop reason: HOSPADM

## 2023-01-09 RX ORDER — ONDANSETRON 2 MG/ML
4 INJECTION INTRAMUSCULAR; INTRAVENOUS EVERY 8 HOURS PRN
Status: DISCONTINUED | OUTPATIENT
Start: 2023-01-09 | End: 2023-02-03 | Stop reason: HOSPADM

## 2023-01-09 RX ORDER — SODIUM CHLORIDE 0.9 % (FLUSH) 0.9 %
10 SYRINGE (ML) INJECTION EVERY 6 HOURS PRN
Status: DISCONTINUED | OUTPATIENT
Start: 2023-01-10 | End: 2023-02-03 | Stop reason: HOSPADM

## 2023-01-09 RX ORDER — PREDNISONE 5 MG/1
5 TABLET ORAL DAILY
Status: DISCONTINUED | OUTPATIENT
Start: 2023-01-15 | End: 2023-02-03 | Stop reason: HOSPADM

## 2023-01-09 RX ORDER — METHOCARBAMOL 500 MG/1
500 TABLET, FILM COATED ORAL 3 TIMES DAILY PRN
Status: DISCONTINUED | OUTPATIENT
Start: 2023-01-09 | End: 2023-02-03 | Stop reason: HOSPADM

## 2023-01-09 RX ORDER — SIMETHICONE 80 MG
1 TABLET,CHEWABLE ORAL 4 TIMES DAILY PRN
Status: DISCONTINUED | OUTPATIENT
Start: 2023-01-09 | End: 2023-02-03 | Stop reason: HOSPADM

## 2023-01-09 RX ORDER — HEPARIN 100 UNIT/ML
500 SYRINGE INTRAVENOUS
Status: DISCONTINUED | OUTPATIENT
Start: 2023-01-09 | End: 2023-01-10 | Stop reason: ALTCHOICE

## 2023-01-09 RX ORDER — FUROSEMIDE 40 MG/1
40 TABLET ORAL DAILY
Status: DISCONTINUED | OUTPATIENT
Start: 2023-01-10 | End: 2023-02-03 | Stop reason: HOSPADM

## 2023-01-09 RX ORDER — MECLIZINE HYDROCHLORIDE 25 MG/1
25 TABLET ORAL 3 TIMES DAILY PRN
Status: DISCONTINUED | OUTPATIENT
Start: 2023-01-09 | End: 2023-02-03 | Stop reason: HOSPADM

## 2023-01-09 RX ORDER — PANTOPRAZOLE SODIUM 40 MG/1
40 TABLET, DELAYED RELEASE ORAL
Status: DISCONTINUED | OUTPATIENT
Start: 2023-01-10 | End: 2023-02-03 | Stop reason: HOSPADM

## 2023-01-09 RX ORDER — ATOVAQUONE 750 MG/5ML
1500 SUSPENSION ORAL DAILY
Status: DISCONTINUED | OUTPATIENT
Start: 2023-01-10 | End: 2023-02-03 | Stop reason: HOSPADM

## 2023-01-09 RX ORDER — CARVEDILOL 25 MG/1
25 TABLET ORAL 2 TIMES DAILY
Status: DISCONTINUED | OUTPATIENT
Start: 2023-01-10 | End: 2023-01-21

## 2023-01-09 RX ORDER — SUCRALFATE 1 G/1
1 TABLET ORAL 3 TIMES DAILY PRN
Status: DISCONTINUED | OUTPATIENT
Start: 2023-01-09 | End: 2023-02-03 | Stop reason: HOSPADM

## 2023-01-09 RX ORDER — IPRATROPIUM BROMIDE AND ALBUTEROL SULFATE 2.5; .5 MG/3ML; MG/3ML
3 SOLUTION RESPIRATORY (INHALATION) EVERY 4 HOURS PRN
Status: DISCONTINUED | OUTPATIENT
Start: 2023-01-09 | End: 2023-02-03 | Stop reason: HOSPADM

## 2023-01-09 RX ORDER — SODIUM CHLORIDE 0.9 % (FLUSH) 0.9 %
10 SYRINGE (ML) INJECTION
Status: DISCONTINUED | OUTPATIENT
Start: 2023-01-09 | End: 2023-02-03 | Stop reason: HOSPADM

## 2023-01-09 RX ORDER — AMOXICILLIN 250 MG
1 CAPSULE ORAL 2 TIMES DAILY PRN
Status: DISCONTINUED | OUTPATIENT
Start: 2023-01-09 | End: 2023-02-03 | Stop reason: HOSPADM

## 2023-01-09 RX ORDER — BISACODYL 10 MG
10 SUPPOSITORY, RECTAL RECTAL DAILY PRN
Status: DISCONTINUED | OUTPATIENT
Start: 2023-01-09 | End: 2023-02-03 | Stop reason: HOSPADM

## 2023-01-09 RX ORDER — NALOXONE HCL 0.4 MG/ML
0.02 VIAL (ML) INJECTION
Status: DISCONTINUED | OUTPATIENT
Start: 2023-01-09 | End: 2023-02-03 | Stop reason: HOSPADM

## 2023-01-09 RX ORDER — SODIUM CHLORIDE 0.9 % (FLUSH) 0.9 %
10 SYRINGE (ML) INJECTION EVERY 6 HOURS
Status: DISCONTINUED | OUTPATIENT
Start: 2023-01-10 | End: 2023-01-14

## 2023-01-09 RX ORDER — B-COMPLEX WITH VITAMIN C
1 TABLET ORAL DAILY
Status: DISCONTINUED | OUTPATIENT
Start: 2023-01-10 | End: 2023-02-03 | Stop reason: HOSPADM

## 2023-01-09 RX ORDER — SODIUM CHLORIDE 0.9 % (FLUSH) 0.9 %
10 SYRINGE (ML) INJECTION
Status: DISCONTINUED | OUTPATIENT
Start: 2023-01-09 | End: 2023-01-14

## 2023-01-09 RX ORDER — HEPARIN SODIUM 5000 [USP'U]/ML
1000 INJECTION, SOLUTION INTRAVENOUS; SUBCUTANEOUS
Status: DISCONTINUED | OUTPATIENT
Start: 2023-01-11 | End: 2023-02-03 | Stop reason: HOSPADM

## 2023-01-09 RX ORDER — PROCHLORPERAZINE EDISYLATE 5 MG/ML
5 INJECTION INTRAMUSCULAR; INTRAVENOUS EVERY 6 HOURS PRN
Status: DISCONTINUED | OUTPATIENT
Start: 2023-01-09 | End: 2023-02-03 | Stop reason: HOSPADM

## 2023-01-09 RX ORDER — CLONIDINE HYDROCHLORIDE 0.1 MG/1
0.1 TABLET ORAL EVERY 8 HOURS PRN
Status: DISCONTINUED | OUTPATIENT
Start: 2023-01-09 | End: 2023-02-03 | Stop reason: HOSPADM

## 2023-01-09 RX ORDER — LANOLIN ALCOHOL/MO/W.PET/CERES
400 CREAM (GRAM) TOPICAL DAILY
Status: DISCONTINUED | OUTPATIENT
Start: 2023-01-10 | End: 2023-02-03 | Stop reason: HOSPADM

## 2023-01-09 RX ORDER — PREDNISONE 5 MG/1
10 TABLET ORAL
Status: COMPLETED | OUTPATIENT
Start: 2023-01-10 | End: 2023-01-14

## 2023-01-09 RX ORDER — TALC
6 POWDER (GRAM) TOPICAL NIGHTLY PRN
Status: DISCONTINUED | OUTPATIENT
Start: 2023-01-09 | End: 2023-02-03 | Stop reason: HOSPADM

## 2023-01-09 RX ORDER — LEVOTHYROXINE SODIUM 25 UG/1
25 TABLET ORAL
Status: DISCONTINUED | OUTPATIENT
Start: 2023-01-10 | End: 2023-02-03 | Stop reason: HOSPADM

## 2023-01-10 PROBLEM — R34 ANURIA: Status: RESOLVED | Noted: 2022-11-10 | Resolved: 2023-01-10

## 2023-01-10 PROBLEM — N30.00 ACUTE CYSTITIS WITHOUT HEMATURIA: Status: RESOLVED | Noted: 2022-12-11 | Resolved: 2023-01-10

## 2023-01-10 LAB
ALBUMIN SERPL BCP-MCNC: 2.4 G/DL (ref 3.5–5.2)
ALP SERPL-CCNC: 71 U/L (ref 55–135)
ALT SERPL W/O P-5'-P-CCNC: 6 U/L (ref 10–44)
ANION GAP SERPL CALC-SCNC: 13 MMOL/L (ref 8–16)
APTT BLDCRRT: 26.6 SEC (ref 21–32)
ASCENDING AORTA: 2.85 CM
AST SERPL-CCNC: 16 U/L (ref 10–40)
AV INDEX (PROSTH): 0.68
AV MEAN GRADIENT: 6 MMHG
AV PEAK GRADIENT: 13 MMHG
AV VALVE AREA: 1.9 CM2
AV VELOCITY RATIO: 0.65
BILIRUB SERPL-MCNC: 0.4 MG/DL (ref 0.1–1)
BSA FOR ECHO PROCEDURE: 1.57 M2
BUN SERPL-MCNC: 60 MG/DL (ref 8–23)
CALCIUM SERPL-MCNC: 8.4 MG/DL (ref 8.7–10.5)
CHLORIDE SERPL-SCNC: 102 MMOL/L (ref 95–110)
CO2 SERPL-SCNC: 25 MMOL/L (ref 23–29)
CREAT SERPL-MCNC: 6.1 MG/DL (ref 0.5–1.4)
CV ECHO LV RWT: 0.34 CM
DOP CALC AO PEAK VEL: 1.82 M/S
DOP CALC AO VTI: 40.35 CM
DOP CALC LVOT AREA: 2.8 CM2
DOP CALC LVOT DIAMETER: 1.89 CM
DOP CALC LVOT PEAK VEL: 1.18 M/S
DOP CALC LVOT STROKE VOLUME: 76.83 CM3
DOP CALCLVOT PEAK VEL VTI: 27.4 CM
E WAVE DECELERATION TIME: 181.04 MSEC
E/A RATIO: 0.99
E/E' RATIO: 16.33 M/S
ECHO LV POSTERIOR WALL: 0.71 CM (ref 0.6–1.1)
EJECTION FRACTION: 60 %
EST. GFR  (NO RACE VARIABLE): 6.7 ML/MIN/1.73 M^2
FRACTIONAL SHORTENING: 30 % (ref 28–44)
GLUCOSE SERPL-MCNC: 87 MG/DL (ref 70–110)
INR PPP: 1.1 (ref 0.8–1.2)
INTERVENTRICULAR SEPTUM: 0.82 CM (ref 0.6–1.1)
LA MAJOR: 5.33 CM
LA MINOR: 5.41 CM
LA WIDTH: 4.45 CM
LEFT ATRIUM SIZE: 4.03 CM
LEFT ATRIUM VOLUME INDEX MOD: 49.8 ML/M2
LEFT ATRIUM VOLUME INDEX: 52.5 ML/M2
LEFT ATRIUM VOLUME MOD: 77.62 CM3
LEFT ATRIUM VOLUME: 81.85 CM3
LEFT INTERNAL DIMENSION IN SYSTOLE: 2.91 CM (ref 2.1–4)
LEFT VENTRICLE DIASTOLIC VOLUME INDEX: 48.38 ML/M2
LEFT VENTRICLE DIASTOLIC VOLUME: 75.48 ML
LEFT VENTRICLE MASS INDEX: 60 G/M2
LEFT VENTRICLE SYSTOLIC VOLUME INDEX: 20.8 ML/M2
LEFT VENTRICLE SYSTOLIC VOLUME: 32.46 ML
LEFT VENTRICULAR INTERNAL DIMENSION IN DIASTOLE: 4.13 CM (ref 3.5–6)
LEFT VENTRICULAR MASS: 92.85 G
LV LATERAL E/E' RATIO: 16.33 M/S
LV SEPTAL E/E' RATIO: 16.33 M/S
MAGNESIUM SERPL-MCNC: 2.1 MG/DL (ref 1.6–2.6)
MV A" WAVE DURATION": 12.56 MSEC
MV PEAK A VEL: 0.99 M/S
MV PEAK E VEL: 0.98 M/S
MV STENOSIS PRESSURE HALF TIME: 52.5 MS
MV VALVE AREA P 1/2 METHOD: 4.19 CM2
PHOSPHATE SERPL-MCNC: 4.5 MG/DL (ref 2.7–4.5)
PISA MRMAX VEL: 0.05 M/S
PISA TR MAX VEL: 2.87 M/S
POTASSIUM SERPL-SCNC: 3.8 MMOL/L (ref 3.5–5.1)
PROT SERPL-MCNC: 5.6 G/DL (ref 6–8.4)
PROTHROMBIN TIME: 11 SEC (ref 9–12.5)
PULM VEIN S/D RATIO: 1.45
PV PEAK D VEL: 0.31 M/S
PV PEAK S VEL: 0.45 M/S
QEF: 56 %
RA MAJOR: 4.13 CM
RA PRESSURE: 3 MMHG
RA WIDTH: 2.76 CM
RIGHT VENTRICULAR END-DIASTOLIC DIMENSION: 2.97 CM
SINUS: 2.56 CM
SODIUM SERPL-SCNC: 140 MMOL/L (ref 136–145)
STJ: 2.32 CM
TDI LATERAL: 0.06 M/S
TDI SEPTAL: 0.06 M/S
TDI: 0.06 M/S
TR MAX PG: 33 MMHG
TRICUSPID ANNULAR PLANE SYSTOLIC EXCURSION: 1.91 CM
TV REST PULMONARY ARTERY PRESSURE: 36 MMHG
VANCOMYCIN SERPL-MCNC: 12.8 UG/ML
VANCOMYCIN SERPL-MCNC: 14.1 UG/ML

## 2023-01-10 PROCEDURE — 63600175 PHARM REV CODE 636 W HCPCS: Performed by: HOSPITALIST

## 2023-01-10 PROCEDURE — 83735 ASSAY OF MAGNESIUM: CPT | Performed by: HOSPITALIST

## 2023-01-10 PROCEDURE — 99222 PR INITIAL HOSPITAL CARE,LEVL II: ICD-10-PCS | Mod: ,,, | Performed by: PHYSICIAN ASSISTANT

## 2023-01-10 PROCEDURE — 85610 PROTHROMBIN TIME: CPT | Performed by: HOSPITALIST

## 2023-01-10 PROCEDURE — 25000003 PHARM REV CODE 250: Performed by: HOSPITALIST

## 2023-01-10 PROCEDURE — 99233 SBSQ HOSP IP/OBS HIGH 50: CPT | Mod: ,,, | Performed by: HOSPITALIST

## 2023-01-10 PROCEDURE — 99222 1ST HOSP IP/OBS MODERATE 55: CPT | Mod: ,,, | Performed by: PHYSICIAN ASSISTANT

## 2023-01-10 PROCEDURE — 84100 ASSAY OF PHOSPHORUS: CPT | Performed by: HOSPITALIST

## 2023-01-10 PROCEDURE — 99223 1ST HOSP IP/OBS HIGH 75: CPT | Mod: GC,,, | Performed by: STUDENT IN AN ORGANIZED HEALTH CARE EDUCATION/TRAINING PROGRAM

## 2023-01-10 PROCEDURE — A4216 STERILE WATER/SALINE, 10 ML: HCPCS | Performed by: HOSPITALIST

## 2023-01-10 PROCEDURE — 99233 PR SUBSEQUENT HOSPITAL CARE,LEVL III: ICD-10-PCS | Mod: ,,, | Performed by: INTERNAL MEDICINE

## 2023-01-10 PROCEDURE — 12000002 HC ACUTE/MED SURGE SEMI-PRIVATE ROOM

## 2023-01-10 PROCEDURE — 97116 GAIT TRAINING THERAPY: CPT

## 2023-01-10 PROCEDURE — 80202 ASSAY OF VANCOMYCIN: CPT | Performed by: HOSPITALIST

## 2023-01-10 PROCEDURE — 99233 SBSQ HOSP IP/OBS HIGH 50: CPT | Mod: ,,, | Performed by: INTERNAL MEDICINE

## 2023-01-10 PROCEDURE — 97165 OT EVAL LOW COMPLEX 30 MIN: CPT

## 2023-01-10 PROCEDURE — 80053 COMPREHEN METABOLIC PANEL: CPT | Performed by: HOSPITALIST

## 2023-01-10 PROCEDURE — 85730 THROMBOPLASTIN TIME PARTIAL: CPT | Performed by: HOSPITALIST

## 2023-01-10 PROCEDURE — 99223 PR INITIAL HOSPITAL CARE,LEVL III: ICD-10-PCS | Mod: GC,,, | Performed by: STUDENT IN AN ORGANIZED HEALTH CARE EDUCATION/TRAINING PROGRAM

## 2023-01-10 PROCEDURE — 99233 PR SUBSEQUENT HOSPITAL CARE,LEVL III: ICD-10-PCS | Mod: ,,, | Performed by: HOSPITALIST

## 2023-01-10 PROCEDURE — 97161 PT EVAL LOW COMPLEX 20 MIN: CPT

## 2023-01-10 PROCEDURE — 97535 SELF CARE MNGMENT TRAINING: CPT

## 2023-01-10 RX ADMIN — Medication 1 TABLET: at 08:01

## 2023-01-10 RX ADMIN — LEVOTHYROXINE SODIUM 25 MCG: 25 TABLET ORAL at 06:01

## 2023-01-10 RX ADMIN — CARVEDILOL 25 MG: 25 TABLET, FILM COATED ORAL at 08:01

## 2023-01-10 RX ADMIN — Medication 10 ML: at 06:01

## 2023-01-10 RX ADMIN — Medication 10 ML: at 12:01

## 2023-01-10 RX ADMIN — VANCOMYCIN HYDROCHLORIDE 500 MG: 500 INJECTION, POWDER, LYOPHILIZED, FOR SOLUTION INTRAVENOUS at 04:01

## 2023-01-10 RX ADMIN — QUETIAPINE FUMARATE 12.5 MG: 25 TABLET ORAL at 02:01

## 2023-01-10 RX ADMIN — CARVEDILOL 25 MG: 25 TABLET, FILM COATED ORAL at 10:01

## 2023-01-10 RX ADMIN — Medication 10 ML: at 08:01

## 2023-01-10 RX ADMIN — QUETIAPINE FUMARATE 12.5 MG: 25 TABLET ORAL at 10:01

## 2023-01-10 RX ADMIN — Medication 10 ML: at 11:01

## 2023-01-10 RX ADMIN — FUROSEMIDE 40 MG: 40 TABLET ORAL at 08:01

## 2023-01-10 RX ADMIN — PANTOPRAZOLE SODIUM 40 MG: 40 TABLET, DELAYED RELEASE ORAL at 06:01

## 2023-01-10 RX ADMIN — PREDNISONE 10 MG: 5 TABLET ORAL at 12:01

## 2023-01-10 RX ADMIN — ATOVAQUONE 1500 MG: 750 SUSPENSION ORAL at 12:01

## 2023-01-10 RX ADMIN — WHITE PETROLATUM: 1.75 OINTMENT TOPICAL at 12:01

## 2023-01-10 RX ADMIN — MAGNESIUM OXIDE TAB 400 MG (241.3 MG ELEMENTAL MG) 400 MG: 400 (241.3 MG) TAB at 08:01

## 2023-01-10 RX ADMIN — ERTAPENEM 500 MG: 1 INJECTION INTRAMUSCULAR; INTRAVENOUS at 04:01
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
## 2023-01-10 NOTE — ASSESSMENT & PLAN NOTE
- goal hemoglobin 10-12, currently 7.7  - most recent iron panel with iron 15, transferrin 138, TIBC 204, 7% saturation and ferritin 378  - transfuse for hemoglobin < 7.0  - defer IV iron in light of bacteremia, will consider epogen (was receiving at [facility])

## 2023-01-10 NOTE — PLAN OF CARE
PT evaluation complete - see note for details. POC and goals established.     Problem: Physical Therapy  Goal: Physical Therapy Goal  Description: Goals to be met by: 23     Patient will increase functional independence with mobility by performin. Sit to stand transfer with Supervision  2. Gait  x 150 feet with Supervision using Rolling Walker.   3. Stand for 8 minutes with Supervision using Rolling Walker    Outcome: Ongoing, Progressing     1/10/2023

## 2023-01-10 NOTE — ASSESSMENT & PLAN NOTE
Patient found to have ESBL ecoli growing in urine cultures. Patient did not complain of dysuria on examination. Patient was treated with 3 days of ertapenam.     - discontinue ertapenem as adequately covered for UTI
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
## 2023-01-10 NOTE — PROGRESS NOTES
Pharmacokinetic Assessment Follow Up: IV Vancomycin    Vancomycin serum concentration assessment(s):    The random level was drawn correctly and can be used to guide therapy at this time. The measurement is below the desired definitive target range of 15 to 20 mcg/mL.    Vancomycin Regimen Plan:    Will re-dose vancomycin 500 mg x 1 dose   ESRD on HD- follow up nephrology recs to see when patient will be dialyzed   Repeat random level with AM labs on 1/11/22  Enterococcal bacteremia     Drug levels (last 3 results):  Recent Labs   Lab Result Units 01/09/23  0346 01/10/23  0401 01/10/23  1113   Vancomycin, Random ug/mL <1.4 14.1 12.8       Pharmacy will continue to follow and monitor vancomycin.    Please contact pharmacy at extension [phone] for questions regarding this assessment.    Thank you for the consult,   Cassidy Mercer       Patient brief summary:  Kristin Goodman is a 77 y.o. female initiated on antimicrobial therapy with IV Vancomycin for treatment of bacteremia    Drug Allergies:   Review of patient's allergies indicates:   Allergen Reactions    Ampicillin     Peaches [peach (prunus persica)] Other (See Comments)     Pt unable to state type of reaction. Information obtained from daughter who states she was informed of allergy from patient.    Penicillins      Other reaction(s): Hives, anaphylaxis    Sulfa (sulfonamide antibiotics) Rash and Hives     Renal Function:   Estimated Creatinine Clearance: 6.5 mL/min (A) (based on SCr of 6.1 mg/dL (H)).,     Dialysis Method (if applicable):  intermittent HD    CBC (last 72 hours):  Recent Labs   Lab Result Units 01/08/23  0600 01/09/23  0346 01/09/23  2031   WBC K/uL 16.94* 13.98* 17.47*   Hemoglobin g/dL 8.3* 7.6* 7.7*   Hematocrit % 26.6* 24.9* 25.8*   Platelets K/uL 260 296 241   Gran % % 79.0*  --  82.0*   Lymph % % 18.0  --  9.0*   Mono % % 2.0*  --  4.0   Eosinophil % % 0.0  --  0.0   Basophil % % 0.0  --  0.0   Differential Method  Manual  --  Manual        Metabolic Panel (last 72 hours):  Recent Labs   Lab Result Units 01/09/23  0346 01/09/23  1931 01/10/23  0401   Sodium mmol/L 134* 140 140   Potassium mmol/L 4.1 4.1 3.8   Chloride mmol/L 99 100 102   CO2 mmol/L 22* 22* 25   Glucose mg/dL 112* 123* 87   BUN mg/dL 59* 59* 60*   Creatinine mg/dL 6.3* 6.6* 6.1*   Albumin g/dL 2.3* 2.6* 2.4*   Total Bilirubin mg/dL 0.3 0.3 0.4   Alkaline Phosphatase U/L 80 80 71   AST U/L 17 23 16   ALT U/L 9* 8* 6*   Magnesium mg/dL 1.9  --  2.1   Phosphorus mg/dL 5.1*  --  4.5       Vancomycin Administrations:  vancomycin given in the last 96 hours                     vancomycin 1 g in 0.9% sodium chloride 250 mL IVPB (ready to mix system) (mg) 1,000 mg New Bag 01/09/23 1302                    Microbiologic Results:  Microbiology Results (last 7 days)       Procedure Component Value Units Date/Time    Blood culture [[id number]] Collected: 01/09/23 2348    Order Status: Completed Specimen: Blood from Peripheral, Lower Arm, Left Updated: 01/10/23 0715     Blood Culture, Routine No Growth to date    Narrative:      Left Peripheral    Blood culture [[id number]] Collected: 01/09/23 2348    Order Status: Completed Specimen: Blood from Peripheral, Lower Arm, Right Updated: 01/10/23 0715     Blood Culture, Routine No Growth to date    Narrative:      Right Peripheral

## 2023-01-10 NOTE — ASSESSMENT & PLAN NOTE
Blood cx 1/5 and 1/7 with enterococcus. Received 1 gm IV vancomycin at LTAC on 1/10  -1/9 blood cultures ngtd   - IR consult to remove tunneled catheter removal today  -obtain TTE ECHo  -infectious disease consultation appreciated  -would continue surveillance cultures daily until 2 samples negative x 48hrs from line removal time.   -pharmd consult for vancomcyin
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
## 2023-01-10 NOTE — HOSPITAL COURSE
Patient finished the course of IV Abx for bacteremia,.Seen by ID and nephrology. Plan for line holiday and IR consulted.has been started on HD,SW consulted for  out patient HD arrangement.she did well with HD and remains stable.  HH was lower,,improved with pack of RBC,  PT,OT was consulted and recommended HH and DME  Out patient HD is arranged,patient was discharged home with HH and DME and follow up with PCP and nephrology as out patient,.

## 2023-01-10 NOTE — PLAN OF CARE
HD line removal completed. Patient tolerated well; VSS. Site CDI. No CXR needed per Dr. Lobato. Report to be called to inpatient RN. Awaiting transport back to bed.

## 2023-01-10 NOTE — ASSESSMENT & PLAN NOTE
77 yoF with microscopic polyangiitis on steroid taper who presents from [facility] after E. Faecalis bacteremia found on blood cultures. Patient seen by Dr. Calvert who recommended ertapenam and vancomycin with removal of the tunneled cath. Patient had a prior allergy to penicillins resulting in anaphylaxis    - recommend continuing vancomycin for e faecalis  - would recommend obtaining CT abdomen/pelvis to rule out bacteremia due to malignancy (contrast pending nephrology recommendations)

## 2023-01-10 NOTE — ASSESSMENT & PLAN NOTE
- Infectious Disease consulted and following, currently on ertapenem and vancomycin  - tentative plans for line holiday with TDC removal per Interventional Radiology

## 2023-01-10 NOTE — HPI
77-year-old  woman with HTN, hypothyroidism, HFpEF, and osteoarthritis and new acute renal failure due to new diagnosis of Microscopic Polyangiitis s/p renal biopsy and requiring hemodialysis is transferred from [facility] for concerns of new bacteremia.     She was hospitalized from 10/17/22-12/13/22 then transferred to [facility].  Microscopic polyangiitis with pulmonary and renal involvement had suffered respiratory failure with diffuse alveolar hemorrhage, gi bleeding, and renal replacement therapy. S/p Rheumatology consultation and pulse IV steroids + plasmapheresis with Transfusion medicine started on Rituximab and Cyclophosphamide.  Continues on Steroid taper for immunosuppression.     More recently earlier this week noted to have a urine culture grow ESBL E.coli Cystitis, started on meropenem, then she had blood cultures from 1/5 and 1/7 that have grown Enterococcus (amp sensitive) vancomycin started today, patient had sluggish HD catheter with dialysis.   Also ED report that patient may have had syncope during HD today.     She is transferred for continued infectious workup and management as well as removal of tunneled HD line for line holiday.

## 2023-01-10 NOTE — ASSESSMENT & PLAN NOTE
Has required transfusions during recent inpatient/[facility] stays   -monitor CBC  -was receiving EPO infusion at nephrology direction.

## 2023-01-10 NOTE — ASSESSMENT & PLAN NOTE
Has preserved EF   -HD was primary volume maintenance   -continue lasix 40mg po daily - discuss with nephrology if higher or more frequent doses are required during her LIne holiday

## 2023-01-10 NOTE — HPI
Ms Goodman is a 77-year-old woman with hypertension, hypothyroidism, HFpEF (most recent TTE with EF 65% with G1DD), pulmonary hypertension, current ESBL E. coli UTI, osteoarthritis, chronic back pain, DONAVAN and microscopic polyangiitis complicated base on biopsy from 10/26 by renal failure, GIB and respiratory failure with history of diffuse alveolar hemorrhage s/p IV Solumedrol, PLEX x5 sessions, Rituximab x4 doses and cyclophosphamide however now  just on steroid taper (cyclophosphamide appears to be hold secondary to anemia) now iHD dependent twice weekly via tunneled HD catheter for metabolic clearance (follows with Dr. Mojica with [facility]) who presented from [facility] for TDC removal in the setting of positive blood cultures growing Enterococcus faecalis and Bacillus species from cultures obtained on 1/5 & 1/7. In addition patient with reported syncopal event and sluggish flows on HD on 1/9 (incomplete session, daughter attributes to iron infusion) with last full session of iHD being on 1/6. She reports still making good urine without any urinary complaints today. Nephrology has been consulted for inpatient HD needs.
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
## 2023-01-10 NOTE — SUBJECTIVE & OBJECTIVE
Interval History: seen with dtr Roro at bedside. Feels ok - no dyspnea. Making urine. Some LE edema but much improved per pt and dtr. Mostly pt is tired of being in hospital - its been a rough year. No chest pain. Had some nausea yesterday after HD but that is not uncommon for her.     Review of Systems   All other systems reviewed and are negative.  Objective:     Vital Signs (Most Recent):  Temp: 97.7 °F (36.5 °C) (01/10/23 0955)  Pulse: 71 (01/10/23 0955)  Resp: 16 (01/10/23 0955)  BP: 120/61 (01/10/23 0955)  SpO2: 99 % (01/10/23 0955) Vital Signs (24h Range):  Temp:  [97.7 °F (36.5 °C)-98.7 °F (37.1 °C)] 97.7 °F (36.5 °C)  Pulse:  [61-80] 71  Resp:  [16-18] 16  SpO2:  [99 %-100 %] 99 %  BP: (120-150)/(61-77) 120/61     Weight: 57.6 kg (127 lb)  Body mass index is 24 kg/m².    Intake/Output Summary (Last 24 hours) at 1/10/2023 1153  Last data filed at 1/10/2023 1125  Gross per 24 hour   Intake 0 ml   Output 300 ml   Net -300 ml      Physical Exam  Constitutional:       Appearance: She is not ill-appearing or toxic-appearing.   HENT:      Head: Normocephalic.      Mouth/Throat:      Mouth: Mucous membranes are moist.   Eyes:      Conjunctiva/sclera: Conjunctivae normal.   Cardiovascular:      Rate and Rhythm: Normal rate and regular rhythm.      Heart sounds: No murmur heard.  Pulmonary:      Effort: Pulmonary effort is normal. No respiratory distress.      Breath sounds: No wheezing or rales.   Abdominal:      General: Abdomen is flat. Bowel sounds are normal.      Tenderness: There is no abdominal tenderness.   Musculoskeletal:      Right lower leg: Edema (1+ ankle) present.      Left lower leg: Edema (1+ ankle) present.   Skin:     General: Skin is warm and dry.      Findings: No erythema.      Comments: Right upper chest tunneled catheter with no erythema, induration or discharge   Neurological:      General: No focal deficit present.      Mental Status: She is alert and oriented to person, place, and time.  Mental status is at baseline.   Psychiatric:         Mood and Affect: Mood normal.       Significant Labs: All pertinent labs within the past 24 hours have been reviewed.    Significant Imaging: I have reviewed all pertinent imaging results/findings within the past 24 hours.
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
## 2023-01-10 NOTE — ASSESSMENT & PLAN NOTE
-continue steroid taper   -patient is on OI prophylaxis with atovaquone 1500mg po daily  -continued on protonix 40mg po bid while on glucocorticoids.

## 2023-01-10 NOTE — ASSESSMENT & PLAN NOTE
Patient with acute kidney injury likely due to microscopic polyangiits with granulomatosis (MPA) WILFREDO is currently stable. Labs reviewed- Renal function/electrolytes with Estimated Creatinine Clearance: 6.5 mL/min (A) (based on SCr of 6.1 mg/dL (H)). according to latest data. Monitor urine output and serial BMP and adjust therapy as needed. Avoid nephrotoxins and renally dose meds for GFR listed above.  Had complex course with DAH, requiring pulse dose steroids, plasmapheresis and then rituximab and cyclophosphamide    -continue prednisone taper  -continue atovaquone for PJP ppx  -will need outpatient rheum follow up

## 2023-01-10 NOTE — ASSESSMENT & PLAN NOTE
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 Now iHD for which she receives HD on one but usually twice weekly for metabolic clearance via tunneled HD catheter roughly x2 a week with last HD Friday (01/06/2023).    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  3)  Focal acute pyelonephritis.  4)  Mild-to-moderate arterionephrosclerosis    Plan/Recommendations:  - WILFREDO with HD dependence and still makes urine (consent for inpatient HD obtained in ED)  - patient last HD on 1/6, she is dialyzed twice weekly on average (usually Monday and Friday)   - okay for line holiday and will monitor closely for acute indications for RRT in the interim while awaiting for blood cultures to clear x48 hours  - can continue Lasix 40 mg daily for now   - currently on prednisone taper 10 mg daily x5 days followed by 5 mg daily with atovaquone 1.5 grams faily for PJP prophylaxis   - renal diet if not NPO  - strict I/Os and daily weights  - daily RFPs and magnesium level  - renally dose all medications to eGFR  - avoid nephrotoxic agents when feasible (i.e. intra-arterial contrast, NSAIDs, supra-therapeutic vancomycin troughs, etc.)
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
## 2023-01-10 NOTE — PROGRESS NOTES
Pharmacokinetic Initial Assessment & Plan: IV Vancomycin    Pt from [facility] on Vancomycin dosing per HD (started on 1/7)  TX: Bacteremia  CX: Enterococcus Faecalis sensitive to Vancomycin.   Pt received 1 g Vancomycin on 01/09 at 1302 at [facility]  Obtain a Vancomycin random on 01/10 @ 0700   Desired empiric serum trough concentration is 10 to 20 mcg/mL       Pharmacy will continue to follow and monitor vancomycin. [id number] with any questions regarding this assessment.     Thank you for the consult,   Alphonso Parikh       Patient brief summary:  Kristin Goodman is a 77 y.o. female initiated on antimicrobial therapy with IV Vancomycin for treatment of suspected bacteremia    Drug Allergies:   Review of patient's allergies indicates:   Allergen Reactions    Ampicillin     Peaches [peach (prunus persica)] Other (See Comments)     Pt unable to state type of reaction. Information obtained from daughter who states she was informed of allergy from patient.    Penicillins      Other reaction(s): Hives    Sulfa (sulfonamide antibiotics) Rash and Hives       Actual Body Weight:   57.6 kg    Renal Function:   Estimated Creatinine Clearance: 6 mL/min (A) (based on SCr of 6.6 mg/dL (H)).,     Dialysis Method (if applicable):  intermittent HD-HD- MWF    CBC (last 72 hours):  Recent Labs   Lab Result Units 01/08/23  0600 01/09/23  0346 01/09/23  2031   WBC K/uL 16.94* 13.98* 17.47*   Hemoglobin g/dL 8.3* 7.6* 7.7*   Hematocrit % 26.6* 24.9* 25.8*   Platelets K/uL 260 296 241   Gran % % 79.0*  --  82.0*   Lymph % % 18.0  --  9.0*   Mono % % 2.0*  --  4.0   Eosinophil % % 0.0  --  0.0   Basophil % % 0.0  --  0.0   Differential Method  Manual  --  Manual       Metabolic Panel (last 72 hours):  Recent Labs   Lab Result Units 01/09/23  0346 01/09/23  1931   Sodium mmol/L 134* 140   Potassium mmol/L 4.1 4.1   Chloride mmol/L 99 100   CO2 mmol/L 22* 22*   Glucose mg/dL 112* 123*   BUN mg/dL 59* 59*   Creatinine mg/dL 6.3* 6.6*   Albumin g/dL 2.3*  2.6*   Total Bilirubin mg/dL 0.3 0.3   Alkaline Phosphatase U/L 80 80   AST U/L 17 23   ALT U/L 9* 8*   Magnesium mg/dL 1.9  --    Phosphorus mg/dL 5.1*  --        Drug levels (last 3 results):  Recent Labs   Lab Result Units 01/09/23  0346   Vancomycin, Random ug/mL <1.4       Microbiologic Results:  Microbiology Results (last 7 days)       Procedure Component Value Units Date/Time    Blood culture [[id number]] Collected: 01/09/23 2348    Order Status: Sent Specimen: Blood from Peripheral, Lower Arm, Left Updated: 01/09/23 2359    Blood culture [[id number]] Collected: 01/09/23 2348    Order Status: Sent Specimen: Blood from Peripheral, Lower Arm, Right Updated: 01/09/23 2359

## 2023-01-10 NOTE — ASSESSMENT & PLAN NOTE
Report of possible syncope with HD,  Dizzy spells noted per LTAC notes - pt s/p MRI brain on 12/8 w/o acute findings   -neurology prior eval has recommended PT/OT for vestibular therapy  -Future outpatient cardiology visit for possible tilt-table eval.   -refer to neurology at discharge for BPPV f/u
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
## 2023-01-10 NOTE — PLAN OF CARE
Problem: Occupational Therapy  Goal: Occupational Therapy Goal  Description: Goals to be met by: 1/17/2023 (1 week)     Patient will increase functional independence with ADLs by performing:    UE Dressing with Schley.  LE Dressing with Schley.  Grooming while standing at sink with Schley.  Toileting from toilet with Schley for hygiene and clothing management.   Step transfer with Schley  Toilet transfer to toilet with Schley.      Evaluated pt and  established OT POC. Continue OT as tolerated.  Chelsie Velasco OT  1/10/2023    Outcome: Ongoing, Progressing

## 2023-01-10 NOTE — ASSESSMENT & PLAN NOTE
-due to Microscopic Polyangiitis  -Nephrology consultation to assist with management, their recent notes indicate they believe pt can tolerate line holiday w/o intermittent HD  -daily chemistry panels. - Eval if phos binders or bicarb will need to be startd.

## 2023-01-10 NOTE — HPI
This is a 77 year old lady with recently diagnosed ESRD 2/2 microscopic polyangiitis, HFpEF, and HTN who was transferred from [facility] for management of bacteremia. 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 Patient required dialysis through a tunneled line. Patient was started on IV steroids, Rituximab, and cyclophosphamide and was discharged to LTAC with plans to go to rehab after. At [facility], patient had some diarrhea and fevers. Of note, there was documentation that the tunneled line was out a couple of centimeters and there was concern for infection. Ucx grew ESBL Ecoli and blood cultures grew pansensitive E. Faecalis. patient was seen by Dr. Calvert who recommended ertapenam and Vancomycin with hospital admission to remove tunneled HD line and further infectious workup.     Patient had a prior anaphylactic reaction to penicillins prior.     ID consulted for ESBL Ecoli and E. Faecalis bacteremia.

## 2023-01-10 NOTE — PROGRESS NOTES
[facility] - [facility]  Hospital Medicine  Progress Note    Patient Name: Kristin Goodman  MRN: [id number]  Patient Class: IP- Inpatient   Admission Date: 1/9/2023  Length of Stay: 1 days  Attending Physician: Aye Mahmood MD  Primary Care Provider: Lori Hernandez MD        Subjective:     Principal Problem:Bacteremia due to Enterococcus        HPI:  77-year-old  woman with HTN, hypothyroidism, HFpEF, and osteoarthritis and new acute renal failure due to new diagnosis of Microscopic Polyangiitis s/p renal biopsy and requiring hemodialysis is transferred from [facility] for concerns of new bacteremia.     She was hospitalized from 10/17/22-12/13/22 then transferred to [facility].  Microscopic polyangiitis with pulmonary and renal involvement had suffered respiratory failure with diffuse alveolar hemorrhage, gi bleeding, and renal replacement therapy. S/p Rheumatology consultation and pulse IV steroids + plasmapheresis with Transfusion medicine started on Rituximab and Cyclophosphamide.  Continues on Steroid taper for immunosuppression.     More recently earlier this week noted to have a urine culture grow ESBL E.coli Cystitis, started on meropenem, then she had blood cultures from 1/5 and 1/7 that have grown Enterococcus (amp sensitive) vancomycin started today, patient had sluggish HD catheter with dialysis.   Also ED report that patient may have had syncope during HD today.     She is transferred for continued infectious workup and management as well as removal of tunneled HD line for line holiday.       Overview/Hospital Course:  Seen by ID and nephrology. Plan for line holiday and IR consulted.      Interval History: seen with dtr Roro at bedside. Feels ok - no dyspnea. Making urine. Some LE edema but much improved per pt and dtr. Mostly pt is tired of being in hospital - its been a rough year. No chest pain. Had some nausea yesterday after HD but that is not uncommon for her.      Review of Systems   All other systems reviewed and are negative.  Objective:     Vital Signs (Most Recent):  Temp: 97.7 °F (36.5 °C) (01/10/23 0955)  Pulse: 71 (01/10/23 0955)  Resp: 16 (01/10/23 0955)  BP: 120/61 (01/10/23 0955)  SpO2: 99 % (01/10/23 0955) Vital Signs (24h Range):  Temp:  [97.7 °F (36.5 °C)-98.7 °F (37.1 °C)] 97.7 °F (36.5 °C)  Pulse:  [61-80] 71  Resp:  [16-18] 16  SpO2:  [99 %-100 %] 99 %  BP: (120-150)/(61-77) 120/61     Weight: 57.6 kg (127 lb)  Body mass index is 24 kg/m².    Intake/Output Summary (Last 24 hours) at 1/10/2023 1153  Last data filed at 1/10/2023 1125  Gross per 24 hour   Intake 0 ml   Output 300 ml   Net -300 ml      Physical Exam  Constitutional:       Appearance: She is not ill-appearing or toxic-appearing.   HENT:      Head: Normocephalic.      Mouth/Throat:      Mouth: Mucous membranes are moist.   Eyes:      Conjunctiva/sclera: Conjunctivae normal.   Cardiovascular:      Rate and Rhythm: Normal rate and regular rhythm.      Heart sounds: No murmur heard.  Pulmonary:      Effort: Pulmonary effort is normal. No respiratory distress.      Breath sounds: No wheezing or rales.   Abdominal:      General: Abdomen is flat. Bowel sounds are normal.      Tenderness: There is no abdominal tenderness.   Musculoskeletal:      Right lower leg: Edema (1+ ankle) present.      Left lower leg: Edema (1+ ankle) present.   Skin:     General: Skin is warm and dry.      Findings: No erythema.      Comments: Right upper chest tunneled catheter with no erythema, induration or discharge   Neurological:      General: No focal deficit present.      Mental Status: She is alert and oriented to person, place, and time. Mental status is at baseline.   Psychiatric:         Mood and Affect: Mood normal.       Significant Labs: All pertinent labs within the past 24 hours have been reviewed.    Significant Imaging: I have reviewed all pertinent imaging results/findings within the past 24  hours.      Assessment/Plan:      * Bacteremia due to Enterococcus  Blood cx 1/5 and 1/7 with enterococcus. Received 1 gm IV vancomycin at LTAC on 1/10  -1/9 blood cultures ngtd   - IR consult to remove tunneled catheter removal today  -obtain TTE ECHo  -infectious disease consultation appreciated  -would continue surveillance cultures daily until 2 samples negative x 48hrs from line removal time.   -pharmd consult for vancomcyin      Urinary tract infection due to extended-spectrum beta lactamase (ESBL) producing Escherichia coli  Urine culture from 1/05 with ESBL E.coli   -Ertapenem renal dosing 500mg IV q24 ordered, plan 7 days (1/5-1/12). Missed dose on 1/9 due to transfer to hospital  -no szymanski catheter present now    Anemia due to chronic kidney disease  Has required transfusions during recent inpatient/LTAC stays   -monitor CBC  -was receiving EPO infusion at nephrology direction.       Primary pauci-immune necrotizing and crescentic glomerulonephritis  Patient with acute kidney injury likely due to microscopic polyangiits with granulomatosis (MPA) WILFREDO is currently stable. Labs reviewed- Renal function/electrolytes with Estimated Creatinine Clearance: 6.5 mL/min (A) (based on SCr of 6.1 mg/dL (H)). according to latest data. Monitor urine output and serial BMP and adjust therapy as needed. Avoid nephrotoxins and renally dose meds for GFR listed above.  Had complex course with DAH, requiring pulse dose steroids, plasmapheresis and then rituximab and cyclophosphamide    -continue prednisone taper  -continue atovaquone for PJP ppx  -will need outpatient rheum follow up    Gastric reflux  Continue BID ppi      Dysphagia  Continue renal diet       Acute renal failure on dialysis  -due to Microscopic Polyangiitis  -Nephrology consultation to assist with management, their recent notes indicate they believe pt can tolerate line holiday w/o intermittent HD  -daily chemistry panels. - Eval if phos binders or bicarb will  need to be startd.       Microscopic polyangiitis  -continue steroid taper   -patient is on OI prophylaxis with atovaquone 1500mg po daily  -continued on protonix 40mg po bid while on glucocorticoids.       Chronic diastolic heart failure  Has preserved EF   -HD was primary volume maintenance   -continue lasix 40mg po daily - discuss with nephrology if higher or more frequent doses are required during her LIne holiday       Syncope  Report of possible syncope with HD,  Dizzy spells noted per LTAC notes - pt s/p MRI brain on 12/8 w/o acute findings   -neurology prior eval has recommended PT/OT for vestibular therapy  -Future outpatient cardiology visit for possible tilt-table eval.   -refer to neurology at discharge for BPPV f/u       Primary hypertension  Continue carvedilol  -home lisinopril is on hold due to her WILFREDO      Hypothyroid  Continue levothyroxine 25mg         VTE Risk Mitigation (From admission, onward)         Ordered     heparin (porcine) injection 1,000 Units  As needed (PRN)         01/09/23 2330     Place sequential compression device  Until discontinued         01/09/23 2330                Discharge Planning   ANTHONY:      Code Status: Full Code   Is the patient medically ready for discharge?: No    Reason for patient still in hospital (select all that apply): Patient new problem and Patient trending condition                     Aye Mahmood MD  Department of Hospital Medicine   [facility] - [facility]
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
## 2023-01-10 NOTE — PHARMACY MED REC
Admission Medication History     The home medication history was taken by Eve Guzman.    Medications listed below were obtained from: [facility]     amLODIPine tablet 5 mg  Dose: 5 mg  Freq: Daily Route: Oral  atovaquone 750 mg/5 mL oral liquid 1,500 mg  Dose: 1,500 mg  Freq: Daily Route: Oral  B complex-vitamin C-folic acid 0.8 mg Tab 0.8 mg  Dose: 1 tablet  Freq: Daily Route: Oral  carvediloL tablet 25 mg  Dose: 25 mg  Freq: 2 times daily Route: Oral  epoetin hira-epbx injection 10,000 Units  Dose: 10,000 Units  Freq: Every Mon, Wed, Fri Route: SubQ  furosemide tablet 40 mg  Dose: 40 mg  Freq: Daily Route: Oral  levothyroxine tablet 25 mcg  Dose: 25 mcg  Freq: Before breakfast Route: Oral  magnesium oxide tablet 400 mg  Dose: 400 mg  Freq: Daily Route: Oral  pantoprazole EC tablet 40 mg  Dose: 40 mg  Freq: 2 times daily before meals Route: Oral  predniSONE tablet 10 mg  Dose: 10 mg  Freq: with lunch Route: Oral    quetiapine split tablet 12.5 mg  Dose: 12.5 mg  Freq: Nightly Route: Oral        Potential issues to be addressed PRIOR TO DISCHARGE  The listed medications were obtained from another facility ([facility]). The patient may not have been able to fill these prescriptions prior to this admission and may require new scripts upon discharge.     Eve Guzman  EXT [phone]                  .

## 2023-01-10 NOTE — PROGRESS NOTES
Pharmacist Renal Dose Adjustment Note    Kristin Goodman is a 77 y.o. female being treated with the medication ertapenem    Patient Data:    Vital Signs (Most Recent):  Temp: 97.7 °F (36.5 °C) (01/10/23 0955)  Pulse: 71 (01/10/23 0955)  Resp: 16 (01/10/23 0955)  BP: 120/61 (01/10/23 0955)  SpO2: 99 % (01/10/23 0955) Vital Signs (72h Range):  Temp:  [96.7 °F (35.9 °C)-99 °F (37.2 °C)]   Pulse:  [56-80]   Resp:  [14-20]   BP: (110-150)/(56-77)   SpO2:  [95 %-100 %]      Recent Labs   Lab 01/09/23  0346 01/09/23  1931 01/10/23  0401   CREATININE 6.3* 6.6* 6.1*     Serum creatinine: 6.1 mg/dL (H) 01/10/23 0401  Estimated creatinine clearance: 6.5 mL/min (A)    Medication:ertapenem dose: 1000 mg Q 24 hrs will be changed to 500 mg Q 24 hrs     Pharmacist's Name: Cassidy Mercer  Pharmacist's Extension: [phone]

## 2023-01-10 NOTE — ASSESSMENT & PLAN NOTE
Urine culture from 1/05 with ESBL E.coli   -Ertapenem renal dosing 500mg IV q24 ordered.   -no szymanski catheter present now

## 2023-01-10 NOTE — PROGRESS NOTES
PharmD Consult received for:   1.) Admit medication history/reconciliation: Pending     2.) Renally adjust all medications:  Estimated Creatinine Clearance: 6.5 mL/min (A) (based on SCr of 6.1 mg/dL (H)).   ESRD on HD  Medications reviewed, dose adjusted ertapenem         Thank you for the consult,  Cassidy Mercer  Ext [phone]    **Note: Consults are reviewed Monday-Friday 7:00am-3:30pm. The above recommendations are only suggested. The recommendations should be considered in conjunction with all patient factors.**

## 2023-01-10 NOTE — ASSESSMENT & PLAN NOTE
Urine culture from 1/05 with ESBL E.coli   -Ertapenem renal dosing 500mg IV q24 ordered, plan 7 days (1/5-1/12). Missed dose on 1/9 due to transfer to hospital  -no szymanski catheter present now

## 2023-01-10 NOTE — ASSESSMENT & PLAN NOTE
-obtain surveillance blood cultures  -keep NPO after midnight - IR consult placed for tunneled catheter removal in AM  -obtain TTE ECHo  -infectious disease consultation  -would continue surveillance cultures daily until 2 samples negative x 48hrs from line removal time.

## 2023-01-11 LAB
ALBUMIN SERPL BCP-MCNC: 2.4 G/DL (ref 3.5–5.2)
ALP SERPL-CCNC: 66 U/L (ref 55–135)
ALT SERPL W/O P-5'-P-CCNC: 6 U/L (ref 10–44)
ANION GAP SERPL CALC-SCNC: 15 MMOL/L (ref 8–16)
APTT BLDCRRT: 28.2 SEC (ref 21–32)
AST SERPL-CCNC: 23 U/L (ref 10–40)
BILIRUB SERPL-MCNC: 0.3 MG/DL (ref 0.1–1)
BUN SERPL-MCNC: 69 MG/DL (ref 8–23)
CALCIUM SERPL-MCNC: 8.4 MG/DL (ref 8.7–10.5)
CHLORIDE SERPL-SCNC: 103 MMOL/L (ref 95–110)
CO2 SERPL-SCNC: 20 MMOL/L (ref 23–29)
CREAT SERPL-MCNC: 6.4 MG/DL (ref 0.5–1.4)
EST. GFR  (NO RACE VARIABLE): 6.3 ML/MIN/1.73 M^2
GLUCOSE SERPL-MCNC: 63 MG/DL (ref 70–110)
INR PPP: 1.1 (ref 0.8–1.2)
MAGNESIUM SERPL-MCNC: 2.2 MG/DL (ref 1.6–2.6)
PHOSPHATE SERPL-MCNC: 5.6 MG/DL (ref 2.7–4.5)
POTASSIUM SERPL-SCNC: 4.4 MMOL/L (ref 3.5–5.1)
PROT SERPL-MCNC: 5.7 G/DL (ref 6–8.4)
PROTHROMBIN TIME: 11 SEC (ref 9–12.5)
SODIUM SERPL-SCNC: 138 MMOL/L (ref 136–145)
VANCOMYCIN SERPL-MCNC: 18.2 UG/ML

## 2023-01-11 PROCEDURE — 99233 PR SUBSEQUENT HOSPITAL CARE,LEVL III: ICD-10-PCS | Mod: GC,,, | Performed by: INTERNAL MEDICINE

## 2023-01-11 PROCEDURE — 25000003 PHARM REV CODE 250: Performed by: HOSPITALIST

## 2023-01-11 PROCEDURE — A4216 STERILE WATER/SALINE, 10 ML: HCPCS | Performed by: HOSPITALIST

## 2023-01-11 PROCEDURE — 99232 PR SUBSEQUENT HOSPITAL CARE,LEVL II: ICD-10-PCS | Mod: GC,,, | Performed by: STUDENT IN AN ORGANIZED HEALTH CARE EDUCATION/TRAINING PROGRAM

## 2023-01-11 PROCEDURE — 99232 SBSQ HOSP IP/OBS MODERATE 35: CPT | Mod: GC,,, | Performed by: STUDENT IN AN ORGANIZED HEALTH CARE EDUCATION/TRAINING PROGRAM

## 2023-01-11 PROCEDURE — 85610 PROTHROMBIN TIME: CPT | Performed by: HOSPITALIST

## 2023-01-11 PROCEDURE — 80053 COMPREHEN METABOLIC PANEL: CPT | Performed by: HOSPITALIST

## 2023-01-11 PROCEDURE — 80202 ASSAY OF VANCOMYCIN: CPT | Performed by: HOSPITALIST

## 2023-01-11 PROCEDURE — 99233 SBSQ HOSP IP/OBS HIGH 50: CPT | Mod: GC,,, | Performed by: INTERNAL MEDICINE

## 2023-01-11 PROCEDURE — 63600175 PHARM REV CODE 636 W HCPCS: Performed by: HOSPITALIST

## 2023-01-11 PROCEDURE — 99233 PR SUBSEQUENT HOSPITAL CARE,LEVL III: ICD-10-PCS | Mod: ,,, | Performed by: HOSPITALIST

## 2023-01-11 PROCEDURE — 83735 ASSAY OF MAGNESIUM: CPT | Performed by: HOSPITALIST

## 2023-01-11 PROCEDURE — 12000002 HC ACUTE/MED SURGE SEMI-PRIVATE ROOM

## 2023-01-11 PROCEDURE — 99233 SBSQ HOSP IP/OBS HIGH 50: CPT | Mod: ,,, | Performed by: HOSPITALIST

## 2023-01-11 PROCEDURE — 84100 ASSAY OF PHOSPHORUS: CPT | Performed by: HOSPITALIST

## 2023-01-11 PROCEDURE — 85730 THROMBOPLASTIN TIME PARTIAL: CPT | Performed by: HOSPITALIST

## 2023-01-11 PROCEDURE — 87040 BLOOD CULTURE FOR BACTERIA: CPT | Performed by: STUDENT IN AN ORGANIZED HEALTH CARE EDUCATION/TRAINING PROGRAM

## 2023-01-11 RX ORDER — MUPIROCIN 20 MG/G
OINTMENT TOPICAL 2 TIMES DAILY
Status: COMPLETED | OUTPATIENT
Start: 2023-01-11 | End: 2023-01-15

## 2023-01-11 RX ORDER — SODIUM CHLORIDE 9 MG/ML
INJECTION, SOLUTION INTRAVENOUS
Status: CANCELLED | OUTPATIENT
Start: 2023-01-11

## 2023-01-11 RX ORDER — SODIUM CHLORIDE 9 MG/ML
INJECTION, SOLUTION INTRAVENOUS ONCE
Status: CANCELLED | OUTPATIENT
Start: 2023-01-11 | End: 2023-01-11

## 2023-01-11 RX ADMIN — ATOVAQUONE 1500 MG: 750 SUSPENSION ORAL at 09:01

## 2023-01-11 RX ADMIN — CARVEDILOL 25 MG: 25 TABLET, FILM COATED ORAL at 09:01

## 2023-01-11 RX ADMIN — LEVOTHYROXINE SODIUM 25 MCG: 25 TABLET ORAL at 06:01

## 2023-01-11 RX ADMIN — Medication 10 ML: at 06:01

## 2023-01-11 RX ADMIN — PANTOPRAZOLE SODIUM 40 MG: 40 TABLET, DELAYED RELEASE ORAL at 03:01

## 2023-01-11 RX ADMIN — FUROSEMIDE 40 MG: 40 TABLET ORAL at 09:01

## 2023-01-11 RX ADMIN — WHITE PETROLATUM: 1.75 OINTMENT TOPICAL at 12:01

## 2023-01-11 RX ADMIN — PREDNISONE 10 MG: 5 TABLET ORAL at 12:01

## 2023-01-11 RX ADMIN — QUETIAPINE FUMARATE 12.5 MG: 25 TABLET ORAL at 09:01

## 2023-01-11 RX ADMIN — MAGNESIUM OXIDE TAB 400 MG (241.3 MG ELEMENTAL MG) 400 MG: 400 (241.3 MG) TAB at 09:01

## 2023-01-11 RX ADMIN — Medication 10 ML: at 12:01

## 2023-01-11 RX ADMIN — Medication 1 TABLET: at 09:01

## 2023-01-11 RX ADMIN — MUPIROCIN: 20 OINTMENT TOPICAL at 12:01

## 2023-01-11 RX ADMIN — PANTOPRAZOLE SODIUM 40 MG: 40 TABLET, DELAYED RELEASE ORAL at 05:01

## 2023-01-11 RX ADMIN — MUPIROCIN: 20 OINTMENT TOPICAL at 09:01

## 2023-01-11 NOTE — ASSESSMENT & PLAN NOTE
Patient with acute kidney injury likely due to microscopic polyangiits with granulomatosis (MPA) WILFREDO is currently stable. Labs reviewed- Renal function/electrolytes with Estimated Creatinine Clearance: 6.3 mL/min (A) (based on SCr of 6.4 mg/dL (H)). according to latest data. Monitor urine output and serial BMP and adjust therapy as needed. Avoid nephrotoxins and renally dose meds for GFR listed above.  Had complex course with DAH, requiring pulse dose steroids, plasmapheresis and then rituximab and cyclophosphamide    -continue prednisone taper  -continue atovaquone for PJP ppx  -will need outpatient rheum follow up

## 2023-01-11 NOTE — SUBJECTIVE & OBJECTIVE
Interval History: dtr at bedside again today. Pt sitting up in chair. Frustrated at so many needle sticks. Otherwise feels ok, no new pain, dyspnea, nausea.     Review of Systems   All other systems reviewed and are negative.  Objective:     Vital Signs (Most Recent):  Temp: 98.3 °F (36.8 °C) (01/11/23 1157)  Pulse: 71 (01/11/23 1157)  Resp: 18 (01/11/23 1157)  BP: (!) 97/52 (01/11/23 1157)  SpO2: 96 % (01/11/23 1157)   Vital Signs (24h Range):  Temp:  [97.7 °F (36.5 °C)-98.3 °F (36.8 °C)] 98.3 °F (36.8 °C)  Pulse:  [61-76] 71  Resp:  [14-18] 18  SpO2:  [95 %-100 %] 96 %  BP: ()/(52-86) 97/52     Weight: 59.6 kg (131 lb 6.3 oz)  Body mass index is 24.83 kg/m².    Intake/Output Summary (Last 24 hours) at 1/11/2023 1312  Last data filed at 1/11/2023 0630  Gross per 24 hour   Intake 50 ml   Output --   Net 50 ml      Physical Exam  Constitutional:       Appearance: She is not ill-appearing or toxic-appearing.   HENT:      Head: Normocephalic.      Mouth/Throat:      Mouth: Mucous membranes are moist.   Eyes:      Conjunctiva/sclera: Conjunctivae normal.   Cardiovascular:      Rate and Rhythm: Normal rate and regular rhythm.      Heart sounds: No murmur heard.  Pulmonary:      Effort: Pulmonary effort is normal. No respiratory distress.      Breath sounds: No wheezing or rales.   Abdominal:      General: Abdomen is flat. Bowel sounds are normal.      Tenderness: There is no abdominal tenderness.   Musculoskeletal:      Right lower leg: Edema (1+ ankle) present.      Left lower leg: Edema (1+ ankle) present.   Skin:     General: Skin is warm and dry.      Findings: No erythema.      Comments: Right upper chest tunneled catheter removed. LUE midline in place   Neurological:      General: No focal deficit present.      Mental Status: She is alert and oriented to person, place, and time. Mental status is at baseline.   Psychiatric:         Mood and Affect: Mood normal.       Significant Labs: All pertinent labs within  the past 24 hours have been reviewed.    Significant Imaging: I have reviewed all pertinent imaging results/findings within the past 24 hours.

## 2023-01-11 NOTE — PLAN OF CARE
Problem: Adult Inpatient Plan of Care  Goal: Plan of Care Review  Outcome: Ongoing, Progressing  Goal: Patient-Specific Goal (Individualized)  Outcome: Ongoing, Progressing  Goal: Absence of Hospital-Acquired Illness or Injury  Outcome: Ongoing, Progressing  Goal: Optimal Comfort and Wellbeing  Outcome: Ongoing, Progressing  Goal: Readiness for Transition of Care  Outcome: Ongoing, Progressing     Problem: Infection  Goal: Absence of Infection Signs and Symptoms  Outcome: Ongoing, Progressing     Problem: Fluid and Electrolyte Imbalance (Acute Kidney Injury/Impairment)  Goal: Fluid and Electrolyte Balance  Outcome: Ongoing, Progressing     Problem: Oral Intake Inadequate (Acute Kidney Injury/Impairment)  Goal: Optimal Nutrition Intake  Outcome: Ongoing, Progressing     Problem: Renal Function Impairment (Acute Kidney Injury/Impairment)  Goal: Effective Renal Function  Outcome: Ongoing, Progressing     Problem: Skin Injury Risk Increased  Goal: Skin Health and Integrity  Outcome: Ongoing, Progressing     Problem: Device-Related Complication Risk (Hemodialysis)  Goal: Safe, Effective Therapy Delivery  Outcome: Ongoing, Progressing     Problem: Hemodynamic Instability (Hemodialysis)  Goal: Effective Tissue Perfusion  Outcome: Ongoing, Progressing     Problem: Infection (Hemodialysis)  Goal: Absence of Infection Signs and Symptoms  Outcome: Ongoing, Progressing

## 2023-01-11 NOTE — PROGRESS NOTES
[facility] ([place] [address])  Nephrology  Progress Note    Patient Name: Kristin Goodman  MRN: [id number]  Admission Date: 1/9/2023  Hospital Length of Stay: 2 days  Attending Provider: Aye Mahmood MD   Primary Care Physician: Lori Hernandez MD  Principal Problem:Bacteremia due to Enterococcus    Subjective:     HPI: Ms Goodman is a 77-year-old woman with hypertension, hypothyroidism, HFpEF (most recent TTE with EF 65% with G1DD), pulmonary hypertension, current ESBL E. coli UTI, osteoarthritis, chronic back pain, DONAVAN and microscopic polyangiitis complicated base on biopsy from 10/26 by renal failure, GIB and respiratory failure with history of diffuse alveolar hemorrhage s/p IV Solumedrol, PLEX x5 sessions, Rituximab x4 doses and cyclophosphamide however now  just on steroid taper (cyclophosphamide appears to be hold secondary to anemia) now iHD dependent twice weekly via tunneled HD catheter for metabolic clearance (follows with Dr. Mojica with [facility]) who presented from [facility] for TDC removal in the setting of positive blood cultures growing Enterococcus faecalis and Bacillus species from cultures obtained on 1/5 & 1/7. In addition patient with reported syncopal event and sluggish flows on HD on 1/9 (incomplete session, daughter attributes to iron infusion) with last full session of iHD being on 1/6. She reports still making good urine without any urinary complaints today. Nephrology has been consulted for inpatient HD needs.      Interval History:     No acute events overnight. Patient with good UOP. Tolerating diet well. No further complaints.     Review of patient's allergies indicates:   Allergen Reactions    Ampicillin     Peaches [peach (prunus persica)] Other (See Comments)     Pt unable to state type of reaction. Information obtained from daughter who states she was informed of allergy from patient.    Penicillins      Other reaction(s): Hives, anaphylaxis     Sulfa (sulfonamide antibiotics) Rash and Hives     Current Facility-Administered Medications   Medication Frequency    acetaminophen tablet 650 mg Q4H PRN    albuterol-ipratropium 2.5 mg-0.5 mg/3 mL nebulizer solution 3 mL Q4H PRN    aluminum-magnesium hydroxide 200-200 mg/5 mL suspension 30 mL QID PRN    atovaquone 750 mg/5 mL oral liquid 1,500 mg Daily    B-complex with vitamin C tablet 1 tablet Daily    bisacodyL suppository 10 mg Daily PRN    carvediloL tablet 25 mg BID    cloNIDine tablet 0.1 mg Q8H PRN    furosemide tablet 40 mg Daily    heparin (porcine) injection 1,000 Units PRN    levothyroxine tablet 25 mcg Before breakfast    magnesium oxide tablet 400 mg Daily    meclizine tablet 25 mg TID PRN    melatonin tablet 6 mg Nightly PRN    methocarbamoL tablet 500 mg TID PRN    naloxone 0.4 mg/mL injection 0.02 mg PRN    ondansetron injection 4 mg Q8H PRN    pantoprazole EC tablet 40 mg BID AC    predniSONE tablet 10 mg with lunch    [START ON 1/15/2023] predniSONE tablet 5 mg Daily    prochlorperazine injection Soln 5 mg Q6H PRN    quetiapine split tablet 12.5 mg QHS    senna-docusate 8.6-50 mg per tablet 1 tablet BID PRN    simethicone chewable tablet 80 mg QID PRN    sodium chloride 0.9% flush 10 mL PRN    sodium chloride 0.9% flush 10 mL Q6H    And    sodium chloride 0.9% flush 10 mL PRN    sodium chloride 0.9% flush 10 mL Q6H PRN    sucralfate tablet 1 g TID PRN    vancomycin - pharmacy to dose pharmacy to manage frequency    white petrolatum 41 % ointment Daily     Facility-Administered Medications Ordered in Other Encounters   Medication Frequency    [MAR Hold - Suspended Admission] 0.9%  NaCl infusion (for blood administration) Q24H PRN    [MAR Hold - Suspended Admission] 0.9%  NaCl infusion PRN    [MAR Hold - Suspended Admission] 0.9%  NaCl infusion Once    [MAR Hold - Suspended Admission] acetaminophen tablet 650 mg Q4H PRN    [MAR Hold - Suspended Admission]  albuterol-ipratropium 2.5 mg-0.5 mg/3 mL nebulizer solution 3 mL Q4H PRN    [MAR Hold - Suspended Admission] alteplase injection 2 mg PRN    [MAR Hold - Suspended Admission] alteplase injection 2 mg PRN    [MAR Hold - Suspended Admission] aluminum-magnesium hydroxide 200-200 mg/5 mL suspension 30 mL QID PRN    [MAR Hold - Suspended Admission] atovaquone 750 mg/5 mL oral liquid 1,500 mg Daily    [MAR Hold - Suspended Admission] B complex-vitamin C-folic acid 0.8 mg Tab 0.8 mg Daily    [MAR Hold - Suspended Admission] bisacodyL suppository 10 mg Daily PRN    [MAR Hold - Suspended Admission] carvediloL tablet 25 mg BID    celecoxib capsule 400 mg Once    [MAR Hold - Suspended Admission] cloNIDine tablet 0.1 mg Q8H PRN    [MAR Hold - Suspended Admission] dextrose 10% bolus 125 mL 125 mL PRN    [MAR Hold - Suspended Admission] dextrose 10% bolus 250 mL 250 mL PRN    [MAR Hold - Suspended Admission] epoetin hira-epbx injection 10,000 Units Every Mon, Wed, Fri    fentaNYL 50 mcg/mL injection  mcg PRN    [MAR Hold - Suspended Admission] furosemide tablet 40 mg Daily    [MAR Hold - Suspended Admission] heparin (porcine) injection 1,000 Units PRN    [MAR Hold - Suspended Admission] heparin, porcine (PF) 100 unit/mL injection flush 500 Units PRN    [MAR Hold - Suspended Admission] heparin, porcine (PF) 100 unit/mL injection flush 500 Units PRN    [MAR Hold - Suspended Admission] levothyroxine tablet 25 mcg Before breakfast    LIDOcaine (PF) 10 mg/ml (1%) injection 10 mg Once PRN    LIDOcaine (PF) 10 mg/ml (1%) injection 10 mg Once    [MAR Hold - Suspended Admission] magnesium oxide tablet 400 mg Daily    [MAR Hold - Suspended Admission] meclizine tablet 25 mg TID PRN    [MAR Hold - Suspended Admission] melatonin tablet 6 mg Nightly PRN    [MAR Hold - Suspended Admission] methocarbamoL tablet 500 mg TID PRN    [MAR Hold - Suspended Admission] metoclopramide HCl injection 5 mg Q6H PRN    midazolam  (VERSED) 1 mg/mL injection 0.5-4 mg PRN    [MAR Hold - Suspended Admission] naloxone 0.4 mg/mL injection 0.02 mg PRN    [MAR Hold - Suspended Admission] ondansetron injection 4 mg Q8H PRN    [MAR Hold - Suspended Admission] pantoprazole EC tablet 40 mg BID AC    [MAR Hold - Suspended Admission] predniSONE tablet 10 mg with lunch    [MAR Hold - Suspended Admission] predniSONE tablet 5 mg Daily    [MAR Hold - Suspended Admission] prochlorperazine injection Soln 5 mg Q6H PRN    [MAR Hold - Suspended Admission] quetiapine split tablet 12.5 mg QHS    ropivacaine 0.2% Nimbus PainPRO Pump infusion 500 ML Continuous    [MAR Hold - Suspended Admission] senna-docusate 8.6-50 mg per tablet 1 tablet BID PRN    [MAR Hold - Suspended Admission] simethicone chewable tablet 80 mg QID PRN    [MAR Hold - Suspended Admission] sodium chloride 0.9% bolus 250 mL 250 mL PRN    [MAR Hold - Suspended Admission] sodium chloride 0.9% bolus 250 mL 250 mL PRN    [MAR Hold - Suspended Admission] sodium chloride 0.9% flush 10 mL PRN    [MAR Hold - Suspended Admission] sodium chloride 0.9% flush 10 mL PRN    [MAR Hold - Suspended Admission] sodium chloride 0.9% flush 10 mL Q6H    And    [MAR Hold - Suspended Admission] sodium chloride 0.9% flush 10 mL PRN    [MAR Hold - Suspended Admission] sucralfate tablet 1 g TID PRN    [MAR Hold - Suspended Admission] vancomycin - pharmacy to dose pharmacy to manage frequency    [MAR Hold - Suspended Admission] white petrolatum 41 % ointment Daily       Objective:     Vital Signs (Most Recent):  Temp: 97.7 °F (36.5 °C) (01/11/23 0845)  Pulse: 64 (01/11/23 0845)  Resp: 18 (01/11/23 0845)  BP: (!) 149/67 (01/11/23 0845)  SpO2: 98 % (01/11/23 0845)   Vital Signs (24h Range):  Temp:  [97.7 °F (36.5 °C)-98.3 °F (36.8 °C)] 97.7 °F (36.5 °C)  Pulse:  [61-76] 64  Resp:  [14-18] 18  SpO2:  [95 %-100 %] 98 %  BP: (102-154)/(58-86) 149/67     Weight: 59.6 kg (131 lb 6.3 oz) (01/11/23 0400)  Body mass  index is 24.83 kg/m².  Body surface area is 1.6 meters squared.    I/O last 3 completed shifts:  In: 50 [P.O.:50]  Out: 300 [Urine:300]    Physical Exam  Constitutional:       General: She is not in acute distress.     Appearance: Normal appearance. She is not ill-appearing, toxic-appearing or diaphoretic.   HENT:      Head: Normocephalic and atraumatic.      Right Ear: External ear normal.      Left Ear: External ear normal.      Nose: Nose normal. No congestion or rhinorrhea.      Mouth/Throat:      Mouth: Mucous membranes are moist.      Pharynx: Oropharynx is clear. No oropharyngeal exudate or posterior oropharyngeal erythema.   Eyes:      General: No scleral icterus.     Extraocular Movements: Extraocular movements intact.      Conjunctiva/sclera: Conjunctivae normal.   Neck:      Vascular: No carotid bruit.   Cardiovascular:      Rate and Rhythm: Normal rate and regular rhythm.      Pulses: Normal pulses.      Heart sounds: Normal heart sounds. No murmur heard.    No friction rub.   Pulmonary:      Effort: Pulmonary effort is normal. No respiratory distress.      Breath sounds: Normal breath sounds. No stridor. No wheezing or rales.   Chest:      Chest wall: No tenderness.   Abdominal:      General: Abdomen is flat. Bowel sounds are normal. There is no distension.      Palpations: There is no mass.      Tenderness: There is no abdominal tenderness. There is no right CVA tenderness, left CVA tenderness or guarding.   Musculoskeletal:         General: No swelling, tenderness or deformity. Normal range of motion.      Cervical back: Normal range of motion. No rigidity or tenderness.      Right lower leg: No edema (1+ ankle).      Left lower leg: No edema (1+ ankle).   Skin:     General: Skin is warm and dry.      Capillary Refill: Capillary refill takes less than 2 seconds.      Coloration: Skin is not jaundiced or pale.      Findings: No bruising or erythema.   Neurological:      General: No focal deficit  present.      Mental Status: She is alert and oriented to person, place, and time. Mental status is at baseline.      Motor: No weakness.      Coordination: Coordination normal.   Psychiatric:         Mood and Affect: Mood normal.         Behavior: Behavior normal.         Thought Content: Thought content normal.       Significant Labs:  All labs within the past 24 hours have been reviewed.     Significant Imaging:      Assessment/Plan:     * Bacteremia due to Enterococcus  - Infectious Disease consulted and following, currently on ertapenem and vancomycin      Urinary tract infection due to extended-spectrum beta lactamase (ESBL) producing Escherichia coli  - Infectious Disease consulted and following  - management per primary team    Anemia due to chronic kidney disease  - goal hemoglobin 10-12, currently 7.7  - most recent iron panel with iron 15, transferrin 138, TIBC 204, 7% saturation and ferritin 378  - transfuse for hemoglobin < 7.0  - defer IV iron in light of bacteremia, will consider epogen (was receiving at [facility])    Acute renal failure on dialysis  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 Now iHD for which she receives HD on one but usually twice weekly for metabolic clearance via tunneled HD catheter roughly x2 a week with last HD Friday (01/06/2023).    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  3)  Focal acute pyelonephritis.  4)  Mild-to-moderate arterionephrosclerosis    Plan/Recommendations:  - no indication for urgent RRT at this time.   - okay for line holiday while awaiting for blood cultures to clear x48 hours  - can continue Lasix 40 mg daily for now   - currently on prednisone taper 10 mg daily x5 days followed by 5 mg daily with atovaquone 1.5 grams faily for PJP prophylaxis   - renal diet if not NPO  - strict I/Os and daily weights  - daily RFPs and magnesium level  - renally dose all medications to eGFR  - avoid nephrotoxic agents when feasible (i.e. intra-arterial contrast, NSAIDs, supra-therapeutic vancomycin troughs, etc.)        Thank you for your consult. I will follow-up with patient. Please contact us if you have any additional questions.     Case discussed with attending. Attestation to follow.       Chris Leyva,   Nephrology  [facility] ([place])

## 2023-01-11 NOTE — SUBJECTIVE & OBJECTIVE
Interval History: IR removed HD tunneled cath yesterday. NAEON.     Review of Systems   Constitutional:  Negative for chills, fatigue and fever.   HENT:  Negative for ear pain and sinus pain.    Eyes:  Negative for photophobia, pain and visual disturbance.   Respiratory:  Negative for cough and shortness of breath.    Cardiovascular:  Negative for chest pain and leg swelling.   Gastrointestinal:  Negative for abdominal pain, blood in stool, constipation, diarrhea, nausea and vomiting.   Endocrine: Negative for polyuria.   Genitourinary:  Negative for difficulty urinating, dysuria, flank pain, pelvic pain and vaginal pain.   Musculoskeletal:  Negative for back pain and neck pain.   Skin:  Negative for color change and rash.   Neurological:  Negative for dizziness, weakness, light-headedness, numbness and headaches.   Psychiatric/Behavioral:  Negative for confusion and sleep disturbance.    Objective:     Vital Signs (Most Recent):  Temp: 97.7 °F (36.5 °C) (01/11/23 0845)  Pulse: 64 (01/11/23 0845)  Resp: 18 (01/11/23 0845)  BP: (!) 149/67 (01/11/23 0845)  SpO2: 98 % (01/11/23 0845)   Vital Signs (24h Range):  Temp:  [97.7 °F (36.5 °C)-98.3 °F (36.8 °C)] 97.7 °F (36.5 °C)  Pulse:  [61-76] 64  Resp:  [14-18] 18  SpO2:  [95 %-100 %] 98 %  BP: (102-154)/(58-86) 149/67     Weight: 59.6 kg (131 lb 6.3 oz)  Body mass index is 24.83 kg/m².    Estimated Creatinine Clearance: 6.3 mL/min (A) (based on SCr of 6.4 mg/dL (H)).    Physical Exam  Constitutional:       General: She is not in acute distress.     Appearance: Normal appearance. She is not ill-appearing, toxic-appearing or diaphoretic.   HENT:      Head: Normocephalic and atraumatic.      Right Ear: External ear normal.      Left Ear: External ear normal.      Nose: Nose normal. No congestion or rhinorrhea.      Mouth/Throat:      Mouth: Mucous membranes are moist.      Pharynx: Oropharynx is clear. No oropharyngeal exudate or posterior oropharyngeal erythema.   Eyes:       General: No scleral icterus.     Extraocular Movements: Extraocular movements intact.      Conjunctiva/sclera: Conjunctivae normal.   Neck:      Vascular: No carotid bruit.   Cardiovascular:      Rate and Rhythm: Normal rate and regular rhythm.      Pulses: Normal pulses.      Heart sounds: Normal heart sounds. No murmur heard.    No friction rub.   Pulmonary:      Effort: Pulmonary effort is normal. No respiratory distress.      Breath sounds: Normal breath sounds. No stridor. No wheezing or rales.   Chest:      Chest wall: No tenderness.   Abdominal:      General: Abdomen is flat. Bowel sounds are normal. There is no distension.      Palpations: There is no mass.      Tenderness: There is no abdominal tenderness. There is no right CVA tenderness, left CVA tenderness or guarding.   Musculoskeletal:         General: No swelling, tenderness or deformity. Normal range of motion.      Cervical back: Normal range of motion. No rigidity or tenderness.      Right lower leg: Edema (1+ ankle) present.      Left lower leg: Edema (1+ ankle) present.   Skin:     General: Skin is warm and dry.      Capillary Refill: Capillary refill takes less than 2 seconds.      Coloration: Skin is not jaundiced or pale.      Findings: No bruising or erythema.   Neurological:      General: No focal deficit present.      Mental Status: She is alert and oriented to person, place, and time. Mental status is at baseline.      Motor: No weakness.      Coordination: Coordination normal.   Psychiatric:         Mood and Affect: Mood normal.         Behavior: Behavior normal.         Thought Content: Thought content normal.       Significant Labs: Blood Culture:   Recent Labs   Lab 01/05/23  1205 01/05/23  1211 01/07/23  1130 01/07/23  1148 01/09/23  2348   LABBLOO No growth after 5 days. Gram stain peds bottle: Gram positive cocci in chains resembling Strep  Results called to and read back by:Faizan Avila LPN 01/06/2023  06:24  Gram stain peds  bottle: Gram positive rods  Results called to and read back by: Yasmin Navarro RN 01/07/2023  12:17  ENTEROCOCCUS FAECALIS*  BACILLUS SPECIES  Organism is a probable contaminant  * Gram stain sumi bottle: Gram positive cocci in chains resembling Strep  Results called to and read back by:Yasmin Navarro RN 01/08/2023  09:58  ENTEROCOCCUS FAECALIS  For susceptibility see order #[id number]  * Gram stain sumi bottle: Gram positive cocci in chains resembling Strep  Results called to and read back by: Xena Sutton RN 01/08/2023  05:33  Gram stain aer bottle: Gram positive cocci in chains resembling Strep  Positive results previously called 01/08/2023  10:29  ENTEROCOCCUS FAECALIS  For susceptibility see order #[id number]  * Gram stain sumi bottle: Gram positive cocci in chains resembling Strep  Results called to and read back by: Ej Haley RN  01/10/2023  15:49  ENTEROCOCCUS FAECALIS  For susceptibility see order #[id number]  *  Gram stain sumi bottle: Gram positive cocci in chains resembling Strep  Positive results previously called 01/10/2023  ENTEROCOCCUS FAECALIS  Susceptibility pending  *     CBC:   Recent Labs   Lab 01/09/23 2031   WBC 17.47*   HGB 7.7*   HCT 25.8*        CMP:   Recent Labs   Lab 01/09/23  1931 01/10/23  0401 01/11/23  0508    140 138   K 4.1 3.8 4.4    102 103   CO2 22* 25 20*   * 87 63*   BUN 59* 60* 69*   CREATININE 6.6* 6.1* 6.4*   CALCIUM 8.5* 8.4* 8.4*   PROT 6.2 5.6* 5.7*   ALBUMIN 2.6* 2.4* 2.4*   BILITOT 0.3 0.4 0.3   ALKPHOS 80 71 66   AST 23 16 23   ALT 8* 6* 6*   ANIONGAP 18* 13 15     All pertinent labs within the past 24 hours have been reviewed.    Significant Imaging: I have reviewed all pertinent imaging results/findings within the past 24 hours.

## 2023-01-11 NOTE — PROGRESS NOTES
[facility] ([address] Medicine  Progress Note    Patient Name: Kristin Goodman  MRN: [id number]  Patient Class: IP- Inpatient   Admission Date: 1/9/2023  Length of Stay: 2 days  Attending Physician: Aye Mahmood MD  Primary Care Provider: Lori Hernandez MD        Subjective:     Principal Problem:Bacteremia due to Enterococcus        HPI:  77-year-old  woman with HTN, hypothyroidism, HFpEF, and osteoarthritis and new acute renal failure due to new diagnosis of Microscopic Polyangiitis s/p renal biopsy and requiring hemodialysis is transferred from [facility] for concerns of new bacteremia.     She was hospitalized from 10/17/22-12/13/22 then transferred to [facility].  Microscopic polyangiitis with pulmonary and renal involvement had suffered respiratory failure with diffuse alveolar hemorrhage, gi bleeding, and renal replacement therapy. S/p Rheumatology consultation and pulse IV steroids + plasmapheresis with Transfusion medicine started on Rituximab and Cyclophosphamide.  Continues on Steroid taper for immunosuppression.     More recently earlier this week noted to have a urine culture grow ESBL E.coli Cystitis, started on meropenem, then she had blood cultures from 1/5 and 1/7 that have grown Enterococcus (amp sensitive) vancomycin started today, patient had sluggish HD catheter with dialysis.   Also ED report that patient may have had syncope during HD today.     She is transferred for continued infectious workup and management as well as removal of tunneled HD line for line holiday.       Overview/Hospital Course:  Seen by ID and nephrology. Plan for line holiday and IR consulted.      Interval History: dtr at bedside again today. Pt sitting up in chair. Frustrated at so many needle sticks. Otherwise feels ok, no new pain, dyspnea, nausea.     Review of Systems   All other systems reviewed and are negative.  Objective:     Vital Signs (Most Recent):  Temp:  98.3 °F (36.8 °C) (01/11/23 1157)  Pulse: 71 (01/11/23 1157)  Resp: 18 (01/11/23 1157)  BP: (!) 97/52 (01/11/23 1157)  SpO2: 96 % (01/11/23 1157)   Vital Signs (24h Range):  Temp:  [97.7 °F (36.5 °C)-98.3 °F (36.8 °C)] 98.3 °F (36.8 °C)  Pulse:  [61-76] 71  Resp:  [14-18] 18  SpO2:  [95 %-100 %] 96 %  BP: ()/(52-86) 97/52     Weight: 59.6 kg (131 lb 6.3 oz)  Body mass index is 24.83 kg/m².    Intake/Output Summary (Last 24 hours) at 1/11/2023 1312  Last data filed at 1/11/2023 0630  Gross per 24 hour   Intake 50 ml   Output --   Net 50 ml      Physical Exam  Constitutional:       Appearance: She is not ill-appearing or toxic-appearing.   HENT:      Head: Normocephalic.      Mouth/Throat:      Mouth: Mucous membranes are moist.   Eyes:      Conjunctiva/sclera: Conjunctivae normal.   Cardiovascular:      Rate and Rhythm: Normal rate and regular rhythm.      Heart sounds: No murmur heard.  Pulmonary:      Effort: Pulmonary effort is normal. No respiratory distress.      Breath sounds: No wheezing or rales.   Abdominal:      General: Abdomen is flat. Bowel sounds are normal.      Tenderness: There is no abdominal tenderness.   Musculoskeletal:      Right lower leg: Edema (1+ ankle) present.      Left lower leg: Edema (1+ ankle) present.   Skin:     General: Skin is warm and dry.      Findings: No erythema.      Comments: Right upper chest tunneled catheter removed. LUE midline in place   Neurological:      General: No focal deficit present.      Mental Status: She is alert and oriented to person, place, and time. Mental status is at baseline.   Psychiatric:         Mood and Affect: Mood normal.       Significant Labs: All pertinent labs within the past 24 hours have been reviewed.    Significant Imaging: I have reviewed all pertinent imaging results/findings within the past 24 hours.      Assessment/Plan:      * Bacteremia due to Enterococcus  Blood cx 1/5, 1/7, 1.9 with enterococcus. Received 1 gm IV vancomycin  at LTAC on 1/10. HD tunelled cath removed 1/10.  Also has midline from LTAC - needs to be removed today. TTE w/o vegetations  -repeat cx today   -infectious disease consultation appreciated  -would continue surveillance cultures daily until 2 samples negative x 48hrs from line removal time.   -pharmd consult for vancomycin  -will get ct abd/pelvis w/o IV contrast per ID recs        Urinary tract infection due to extended-spectrum beta lactamase (ESBL) producing Escherichia coli  Urine culture from 1/05 with ESBL E.coli   -Ertapenem renal dosing 500mg IV q24 ordered, completed 5 days (1/5-1/10). Missed dose on 1/9 due to transfer to hospital  -no szymanski catheter present now    Anemia due to chronic kidney disease  Has required transfusions during recent inpatient/LTAC stays   -monitor CBC  -was receiving EPO infusion at nephrology direction.       Primary pauci-immune necrotizing and crescentic glomerulonephritis  Patient with acute kidney injury likely due to microscopic polyangiits with granulomatosis (MPA) WILFREDO is currently stable. Labs reviewed- Renal function/electrolytes with Estimated Creatinine Clearance: 6.3 mL/min (A) (based on SCr of 6.4 mg/dL (H)). according to latest data. Monitor urine output and serial BMP and adjust therapy as needed. Avoid nephrotoxins and renally dose meds for GFR listed above.  Had complex course with DAH, requiring pulse dose steroids, plasmapheresis and then rituximab and cyclophosphamide    -continue prednisone taper  -continue atovaquone for PJP ppx  -will need outpatient rheum follow up    Gastric reflux  Continue BID ppi      Dysphagia  Continue renal diet       Acute renal failure on dialysis  -due to Microscopic Polyangiitis  -Nephrology consultation to assist with management, their recent notes indicate they believe pt can tolerate line holiday w/o intermittent HD  -daily chemistry panels. - Eval if phos binders or bicarb will need to be startd.       Microscopic  polyangiitis  -continue steroid taper   -patient is on OI prophylaxis with atovaquone 1500mg po daily  -continued on protonix 40mg po bid while on glucocorticoids.       Chronic diastolic heart failure  Has preserved EF   -HD was primary volume maintenance   -continue lasix 40mg po daily - discuss with nephrology if higher or more frequent doses are required during her LIne holiday       Syncope  Report of possible syncope with HD,  Dizzy spells noted per LTAC notes - pt s/p MRI brain on 12/8 w/o acute findings   -neurology prior eval has recommended PT/OT for vestibular therapy  -Future outpatient cardiology visit for possible tilt-table eval.   -refer to neurology at discharge for BPPV f/u       Primary hypertension  Continue carvedilol  -home lisinopril is on hold due to her WILFREDO      Hypothyroid  Continue levothyroxine 25mg         VTE Risk Mitigation (From admission, onward)         Ordered     heparin (porcine) injection 1,000 Units  As needed (PRN)         01/09/23 2330     Place sequential compression device  Until discontinued         01/09/23 2330                Discharge Planning   ANTHONY: 1/16/2023     Code Status: Full Code   Is the patient medically ready for discharge?: No    Reason for patient still in hospital (select all that apply): Patient trending condition                     Aye Mahmood MD  Department of Hospital Medicine   [facility] - Intensive Care ([place])

## 2023-01-11 NOTE — CONSULTS
"PharmD Consult received for:   1.) Admit medication history/reconciliation: Completed; please see- "Admission Medication Reconciliation - Pharmacy Consult Note on 1/10/23"     2.) Renally adjust all medications:  Estimated Creatinine Clearance: 6.3 mL/min (A) (based on SCr of 6.4 mg/dL (H)).    Medications reviewed.  No dose adjustments needed.   ESRD on HD twice weekly (M/F); on line holiday - HD line removed 1/10/23    Thank you for the consult,  Angelica Stone, PharmD  Ext [phone]     **Note: Consults are reviewed Monday-Friday 7:00am-3:30pm. The above recommendations are only suggested. The recommendations should be considered in conjunction with all patient factors.**   "

## 2023-01-11 NOTE — PROGRESS NOTES
Pharmacokinetic Assessment Follow Up: IV Vancomycin    Vancomycin serum concentration assessment(s)/plan:     The random level resulted as 18.2 mcg/mL (~12.5 hrs post dose) and can be used to guide therapy at this time. The measurement is within the desired definitive target range of 15 to 20 mcg/mL(for E.Faecalis bacteremia).  ESRD on HD (twice weekly MF).  HD line removed 1/10 with plan for line holiday while awaiting for blood cultures to clear x48 hours.  Minimal UOP.  Will not dose Vancomycin today and collect random level with AM labs on 1/12/23.  Follow up Nephro recs for future HD plan.    Drug levels (last 3 results):  Recent Labs   Lab Result Units 01/10/23  0401 01/10/23  1113 01/11/23  0508   Vancomycin, Random ug/mL 14.1 12.8 18.2       Pharmacy will continue to follow and monitor vancomycin.    Please contact pharmacy at extension [phone] for questions regarding this assessment.    Thank you for the consult,   Angelica Stone       Patient brief summary:  Kristin Goodman is a 77 y.o. female initiated on antimicrobial therapy with IV Vancomycin for treatment of bacteremia    The patient's current regimen is pulse dosing    Drug Allergies:   Review of patient's allergies indicates:   Allergen Reactions    Ampicillin     Peaches [peach (prunus persica)] Other (See Comments)     Pt unable to state type of reaction. Information obtained from daughter who states she was informed of allergy from patient.    Penicillins      Other reaction(s): Hives, anaphylaxis    Sulfa (sulfonamide antibiotics) Rash and Hives       Actual Body Weight:   59.6 kg    Renal Function:   Estimated Creatinine Clearance: 6.3 mL/min (A) (based on SCr of 6.4 mg/dL (H)).,     Dialysis Method (if applicable):  intermittent HD    CBC (last 72 hours):  Recent Labs   Lab Result Units 01/09/23  0346 01/09/23  2031   WBC K/uL 13.98* 17.47*   Hemoglobin g/dL 7.6* 7.7*   Hematocrit % 24.9* 25.8*   Platelets K/uL 296 241   Gran % %  --  82.0*    Lymph % %  --  9.0*   Mono % %  --  4.0   Eosinophil % %  --  0.0   Basophil % %  --  0.0   Differential Method   --  Manual       Metabolic Panel (last 72 hours):  Recent Labs   Lab Result Units 01/09/23  0346 01/09/23  1931 01/10/23  0401 01/11/23  0508   Sodium mmol/L 134* 140 140 138   Potassium mmol/L 4.1 4.1 3.8 4.4   Chloride mmol/L 99 100 102 103   CO2 mmol/L 22* 22* 25 20*   Glucose mg/dL 112* 123* 87 63*   BUN mg/dL 59* 59* 60* 69*   Creatinine mg/dL 6.3* 6.6* 6.1* 6.4*   Albumin g/dL 2.3* 2.6* 2.4* 2.4*   Total Bilirubin mg/dL 0.3 0.3 0.4 0.3   Alkaline Phosphatase U/L 80 80 71 66   AST U/L 17 23 16 23   ALT U/L 9* 8* 6* 6*   Magnesium mg/dL 1.9  --  2.1 2.2   Phosphorus mg/dL 5.1*  --  4.5 5.6*       Vancomycin Administrations:  vancomycin given in the last 96 hours                     vancomycin (VANCOCIN) 500 mg in dextrose 5 % in water (D5W) 5 % 100 mL IVPB (MB+) (mg) 500 mg New Bag 01/10/23 1629    vancomycin 1 g in 0.9% sodium chloride 250 mL IVPB (ready to mix system) (mg) 1,000 mg New Bag 01/09/23 1302                    Microbiologic Results:  Microbiology Results (last 7 days)       Procedure Component Value Units Date/Time    Blood culture [[id number]] Collected: 01/11/23 0958    Order Status: Sent Specimen: Blood Updated: 01/11/23 1015    Narrative:      Collection has been rescheduled by [email or web address] at 01/11/2023 09:23 Reason:   Patient unavailable is a hard stick [place] [id number]  Collection has been rescheduled by [email or web address] at 01/11/2023 09:23 Reason:   Patient unavailable is a hard stick [place] [id number]    Blood culture [[id number]] Collected: 01/11/23 0958    Order Status: Sent Specimen: Blood Updated: 01/11/23 1015    Narrative:      Collection has been rescheduled by DAQUAN at 01/11/2023 09:23 Reason:   Patient unavailable is a hard stick [place] [id number]  Collection has been rescheduled by [email or web address] at 01/11/2023 09:23 Reason:   Patient unavailable is a hard stick [place] [id number]    Blood culture [[id number]]  (Abnormal)  Collected: 01/09/23 2348    Order Status: Completed Specimen: Blood from Peripheral, Lower Arm, Right Updated: 01/11/23 0956     Blood Culture, Routine Gram stain sumi bottle: Gram positive cocci in chains resembling Strep      Results called to and read back by: Ej Haley RN  01/10/2023  15:49      ENTEROCOCCUS FAECALIS  For susceptibility see order #[id number]      Narrative:      Right Peripheral    Blood culture [[id number]]  (Abnormal) Collected: 01/09/23 2348    Order Status: Completed Specimen: Blood from Peripheral, Lower Arm, Left Updated: 01/11/23 0955     Blood Culture, Routine Gram stain sumi bottle: Gram positive cocci in chains resembling Strep      Positive results previously called 01/10/2023      ENTEROCOCCUS FAECALIS  Susceptibility pending      Narrative:      Left Peripheral    Blood culture [[id number]]     Order Status: Canceled Specimen: Blood     Blood culture [[id number]]     Order Status: Canceled Specimen: Blood     Blood culture [[id number]]     Order Status: Canceled Specimen: Blood

## 2023-01-11 NOTE — PLAN OF CARE
[facility] ([place] [address])  Initial Discharge Assessment       Primary Care Provider: Lori Hernandez MD    Admission Diagnosis: Bacteremia [R78.81]  Syncope [R55]  Acute renal failure with tubular necrosis [N17.0]  Microscopic polyangiitis [M31.7]  Bacteremia due to Enterococcus [R78.81, B95.2]  Encounter for continuous renal replacement therapy (CRRT) for acute renal failure [N17.9]    Admission Date: 1/9/2023  Expected Discharge Date: 1/16/2023    Discharge Barriers Identified: (P) None    Payor: [employer] MEDICARE / Plan: [employer] COMPLETE / Product Type: Medicare Advantage /     Extended Emergency Contact Information  Primary Emergency Contact: Roro Goodman  Address: [address] [place] [address] [address]           [place] [address] [facility]  Home Phone: [phone]  Mobile Phone: [phone]  Relation: Daughter  Secondary Emergency Contact: Padma Paez   [place]  Mobile Phone: [phone]  Relation: Sister    Discharge Plan A: Home  Discharge Plan B: Home Health    No Pharmacies Listed    Initial Assessment (most recent)       Adult Discharge Assessment - 01/11/23 1442          Discharge Assessment    Assessment Type Discharge Planning Assessment     Confirmed/corrected address, phone number and insurance Yes     Confirmed Demographics Correct on Facesheet     Source of Information patient     Communicated ANTHONY with patient/caregiver No     Reason For Admission Bacteremia     People in Home child(arthur), adult     Do you expect to return to your current living situation? No   Pt states she will probably go home with her sister - unable to provide address.    Do you have help at home or someone to help you manage your care at home? Yes     Who are your caregiver(s) and their phone number(s)? Sister Padma Paez     Prior to hospitilization cognitive status: Alert/Oriented     Current cognitive status: Alert/Oriented     Home  Accessibility stairs to enter home;stairs within home     Number of Stairs, Within Home, Primary other (see comments)   10-15    Number of Stairs, Main Entrance other (see comments)   10-15    Equipment Currently Used at Home none     Readmission within 30 days? No     Do you currently have service(s) that help you manage your care at home? No     Do you take prescription medications? Yes     Do you have prescription coverage? Yes     Coverage PHN     Who is going to help you get home at discharge? Sister, Padma Paez [phone]     How do you get to doctors appointments? family or friend will provide     Are you on dialysis? No     Do you take coumadin? No     Discharge Plan A Home     Discharge Plan B Home Health     DME Needed Upon Discharge  other (see comments)   tbd    Discharge Plan discussed with: Patient (P)      Discharge Barriers Identified None (P)         OTHER    Name(s) of People in Home [facility] [phone] (P)

## 2023-01-11 NOTE — ASSESSMENT & PLAN NOTE
Blood cx 1/5, 1/7, 1.9 with enterococcus. Received 1 gm IV vancomycin at LTAC on 1/10. HD tunelled cath removed 1/10.  Also has midline from LTAC - needs to be removed today. TTE w/o vegetations  -repeat cx today   -infectious disease consultation appreciated  -would continue surveillance cultures daily until 2 samples negative x 48hrs from line removal time.   -pharmd consult for vancomycin  -will get ct abd/pelvis w/o IV contrast per ID recs

## 2023-01-11 NOTE — NURSING
HD orders were written for this pt today, but pt is currently without HD access and is unable to receive access today d/t line holiday.

## 2023-01-11 NOTE — ASSESSMENT & PLAN NOTE
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 Now iHD for which she receives HD on one but usually twice weekly for metabolic clearance via tunneled HD catheter roughly x2 a week with last HD Friday (01/06/2023).    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  2)  Necrotizing cresentic glomerulonephritis/[id number] necrotizing cresecentic glomerulonephritis.  3)  Focal acute pyelonephritis.  4)  Mild-to-moderate arterionephrosclerosis    Plan/Recommendations:  - no indication for urgent RRT at this time.   - okay for line holiday while awaiting for blood cultures to clear x48 hours  - can continue Lasix 40 mg daily for now   - currently on prednisone taper 10 mg daily x5 days followed by 5 mg daily with atovaquone 1.5 grams faily for PJP prophylaxis   - renal diet if not NPO  - strict I/Os and daily weights  - daily RFPs and magnesium level  - renally dose all medications to eGFR  - avoid nephrotoxic agents when feasible (i.e. intra-arterial contrast, NSAIDs, supra-therapeutic vancomycin troughs, etc.)   Consent (Temporal Branch)/Introductory Paragraph: The rationale for Mohs was explained to the patient and consent was obtained. The risks, benefits and alternatives to therapy were discussed in detail. Specifically, the risks of damage to the temporal branch of the facial nerve, infection, scarring, bleeding, prolonged wound healing, incomplete removal, allergy to anesthesia, and recurrence were addressed. Prior to the procedure, the treatment site was clearly identified and confirmed by the patient. All components of Universal Protocol/PAUSE Rule completed.

## 2023-01-11 NOTE — ASSESSMENT & PLAN NOTE
Urine culture from 1/05 with ESBL E.coli   -Ertapenem renal dosing 500mg IV q24 ordered, completed 5 days (1/5-1/10). Missed dose on 1/9 due to transfer to hospital  -no szymanski catheter present now

## 2023-01-11 NOTE — PROGRESS NOTES
[facility] - [facility] ([place] [address])  Infectious Disease  Progress Note    Patient Name: Kristin Goodman  MRN: [id number]  Admission Date: 1/9/2023  Length of Stay: 2 days  Attending Physician: Aye Mahmood MD  Primary Care Provider: Lori Hernandez MD    Isolation Status: No active isolations  Assessment/Plan:      * Bacteremia due to Enterococcus  77 yoF with microscopic polyangiitis on steroid taper who presents from [facility] after E. Faecalis bacteremia found on blood cultures. Patient seen by Dr. Calvert who recommended ertapenam and vancomycin with removal of the tunneled cath. Patient had a prior allergy to penicillins resulting in anaphylaxis. Repeat BCx showed continued enterococcus     - continue vancomycin for e faecalis  - f/u repeat blood cultures  - would recommend obtaining CT abdomen/pelvis to rule out bacteremia due to fluid collections/ malignancy (contrast pending nephrology recommendations). Will determine length of antibiotic treatment, likely 2 weeks if negative.         Anticipated Disposition: per primary team    Thank you for your consult. I will follow-up with patient. Please contact us if you have any additional questions.    Megha Ramírez MD  Infectious Disease  [facility] - [facility] ([place] [address])    Subjective:     Principal Problem:Bacteremia due to Enterococcus    HPI: This is a 77 year old lady with recently diagnosed ESRD 2/2 microscopic polyangiitis, HFpEF, and HTN who was transferred from [facility] for management of bacteremia. 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 Patient required dialysis through a tunneled line. Patient was started on IV steroids, Rituximab, and cyclophosphamide and was discharged to [facility] with plans to go to rehab after. At [facility], patient had some diarrhea and fevers. Of note, there was documentation that the  tunneled line was out a couple of centimeters and there was concern for infection. Ucx grew ESBL Ecoli and blood cultures grew pansensitive E. Faecalis. patient was seen by Dr. Calvert who recommended ertapenam and Vancomycin with hospital admission to remove tunneled HD line and further infectious workup.     Patient had a prior anaphylactic reaction to penicillins prior.     ID consulted for ESBL Ecoli and E. Faecalis bacteremia.     Interval History: IR removed HD tunneled cath yesterday. NAEON.     Review of Systems   Constitutional:  Negative for chills, fatigue and fever.   HENT:  Negative for ear pain and sinus pain.    Eyes:  Negative for photophobia, pain and visual disturbance.   Respiratory:  Negative for cough and shortness of breath.    Cardiovascular:  Negative for chest pain and leg swelling.   Gastrointestinal:  Negative for abdominal pain, blood in stool, constipation, diarrhea, nausea and vomiting.   Endocrine: Negative for polyuria.   Genitourinary:  Negative for difficulty urinating, dysuria, flank pain, pelvic pain and vaginal pain.   Musculoskeletal:  Negative for back pain and neck pain.   Skin:  Negative for color change and rash.   Neurological:  Negative for dizziness, weakness, light-headedness, numbness and headaches.   Psychiatric/Behavioral:  Negative for confusion and sleep disturbance.    Objective:     Vital Signs (Most Recent):  Temp: 97.7 °F (36.5 °C) (01/11/23 0845)  Pulse: 64 (01/11/23 0845)  Resp: 18 (01/11/23 0845)  BP: (!) 149/67 (01/11/23 0845)  SpO2: 98 % (01/11/23 0845)   Vital Signs (24h Range):  Temp:  [97.7 °F (36.5 °C)-98.3 °F (36.8 °C)] 97.7 °F (36.5 °C)  Pulse:  [61-76] 64  Resp:  [14-18] 18  SpO2:  [95 %-100 %] 98 %  BP: (102-154)/(58-86) 149/67     Weight: 59.6 kg (131 lb 6.3 oz)  Body mass index is 24.83 kg/m².    Estimated Creatinine Clearance: 6.3 mL/min (A) (based on SCr of 6.4 mg/dL (H)).    Physical Exam  Constitutional:       General: She is not in acute  distress.     Appearance: Normal appearance. She is not ill-appearing, toxic-appearing or diaphoretic.   HENT:      Head: Normocephalic and atraumatic.      Right Ear: External ear normal.      Left Ear: External ear normal.      Nose: Nose normal. No congestion or rhinorrhea.      Mouth/Throat:      Mouth: Mucous membranes are moist.      Pharynx: Oropharynx is clear. No oropharyngeal exudate or posterior oropharyngeal erythema.   Eyes:      General: No scleral icterus.     Extraocular Movements: Extraocular movements intact.      Conjunctiva/sclera: Conjunctivae normal.   Neck:      Vascular: No carotid bruit.   Cardiovascular:      Rate and Rhythm: Normal rate and regular rhythm.      Pulses: Normal pulses.      Heart sounds: Normal heart sounds. No murmur heard.    No friction rub.   Pulmonary:      Effort: Pulmonary effort is normal. No respiratory distress.      Breath sounds: Normal breath sounds. No stridor. No wheezing or rales.   Chest:      Chest wall: No tenderness.   Abdominal:      General: Abdomen is flat. Bowel sounds are normal. There is no distension.      Palpations: There is no mass.      Tenderness: There is no abdominal tenderness. There is no right CVA tenderness, left CVA tenderness or guarding.   Musculoskeletal:         General: No swelling, tenderness or deformity. Normal range of motion.      Cervical back: Normal range of motion. No rigidity or tenderness.      Right lower leg: Edema (1+ ankle) present.      Left lower leg: Edema (1+ ankle) present.   Skin:     General: Skin is warm and dry.      Capillary Refill: Capillary refill takes less than 2 seconds.      Coloration: Skin is not jaundiced or pale.      Findings: No bruising or erythema.   Neurological:      General: No focal deficit present.      Mental Status: She is alert and oriented to person, place, and time. Mental status is at baseline.      Motor: No weakness.      Coordination: Coordination normal.   Psychiatric:          Mood and Affect: Mood normal.         Behavior: Behavior normal.         Thought Content: Thought content normal.       Significant Labs: Blood Culture:   Recent Labs   Lab 01/05/23  1205 01/05/23  1211 01/07/23  1130 01/07/23  1148 01/09/23  2348   LABBLOO No growth after 5 days. Gram stain peds bottle: Gram positive cocci in chains resembling Strep  Results called to and read back by:Faizan Avila LPN 01/06/2023  06:24  Gram stain peds bottle: Gram positive rods  Results called to and read back by: Yasmin Navarro RN 01/07/2023  12:17  ENTEROCOCCUS FAECALIS*  BACILLUS SPECIES  Organism is a probable contaminant  * Gram stain sumi bottle: Gram positive cocci in chains resembling Strep  Results called to and read back by:Yasmin Navarro RN 01/08/2023  09:58  ENTEROCOCCUS FAECALIS  For susceptibility see order #[id number]  * Gram stain sumi bottle: Gram positive cocci in chains resembling Strep  Results called to and read back by: Xena Sutton RN 01/08/2023  05:33  Gram stain aer bottle: Gram positive cocci in chains resembling Strep  Positive results previously called 01/08/2023  10:29  ENTEROCOCCUS FAECALIS  For susceptibility see order #[id number]  * Gram stain sumi bottle: Gram positive cocci in chains resembling Strep  Results called to and read back by: Ej Haley RN  01/10/2023  15:49  ENTEROCOCCUS FAECALIS  For susceptibility see order #[id number]  *  Gram stain sumi bottle: Gram positive cocci in chains resembling Strep  Positive results previously called 01/10/2023  ENTEROCOCCUS FAECALIS  Susceptibility pending  *     CBC:   Recent Labs   Lab 01/09/23 2031   WBC 17.47*   HGB 7.7*   HCT 25.8*        CMP:   Recent Labs   Lab 01/09/23  1931 01/10/23  0401 01/11/23  0508    140 138   K 4.1 3.8 4.4    102 103   CO2 22* 25 20*   * 87 63*   BUN 59* 60* 69*   CREATININE 6.6* 6.1* 6.4*   CALCIUM 8.5* 8.4* 8.4*   PROT 6.2 5.6* 5.7*   ALBUMIN 2.6* 2.4* 2.4*   BILITOT 0.3 0.4 0.3    ALKPHOS 80 71 66   AST 23 16 23   ALT 8* 6* 6*   ANIONGAP 18* 13 15     All pertinent labs within the past 24 hours have been reviewed.    Significant Imaging: I have reviewed all pertinent imaging results/findings within the past 24 hours.

## 2023-01-11 NOTE — ASSESSMENT & PLAN NOTE
77 yoF with microscopic polyangiitis on steroid taper who presents from [facility] after E. Faecalis bacteremia found on blood cultures. Patient seen by Dr. Calvert who recommended ertapenam and vancomycin with removal of the tunneled cath. Patient had a prior allergy to penicillins resulting in anaphylaxis. Repeat BCx showed continued enterococcus     - continue vancomycin for e faecalis  - f/u repeat blood cultures  - would recommend obtaining CT abdomen/pelvis to rule out bacteremia due to fluid collections/ malignancy (contrast pending nephrology recommendations). Will determine length of antibiotic treatment, likely 2 weeks if negative.

## 2023-01-11 NOTE — SUBJECTIVE & OBJECTIVE
Interval History:     No acute events overnight. Patient with good UOP. Tolerating diet well. No further complaints.     Review of patient's allergies indicates:   Allergen Reactions    Ampicillin     Peaches [peach (prunus persica)] Other (See Comments)     Pt unable to state type of reaction. Information obtained from daughter who states she was informed of allergy from patient.    Penicillins      Other reaction(s): Hives, anaphylaxis    Sulfa (sulfonamide antibiotics) Rash and Hives     Current Facility-Administered Medications   Medication Frequency    acetaminophen tablet 650 mg Q4H PRN    albuterol-ipratropium 2.5 mg-0.5 mg/3 mL nebulizer solution 3 mL Q4H PRN    aluminum-magnesium hydroxide 200-200 mg/5 mL suspension 30 mL QID PRN    atovaquone 750 mg/5 mL oral liquid 1,500 mg Daily    B-complex with vitamin C tablet 1 tablet Daily    bisacodyL suppository 10 mg Daily PRN    carvediloL tablet 25 mg BID    cloNIDine tablet 0.1 mg Q8H PRN    furosemide tablet 40 mg Daily    heparin (porcine) injection 1,000 Units PRN    levothyroxine tablet 25 mcg Before breakfast    magnesium oxide tablet 400 mg Daily    meclizine tablet 25 mg TID PRN    melatonin tablet 6 mg Nightly PRN    methocarbamoL tablet 500 mg TID PRN    naloxone 0.4 mg/mL injection 0.02 mg PRN    ondansetron injection 4 mg Q8H PRN    pantoprazole EC tablet 40 mg BID AC    predniSONE tablet 10 mg with lunch    [START ON 1/15/2023] predniSONE tablet 5 mg Daily    prochlorperazine injection Soln 5 mg Q6H PRN    quetiapine split tablet 12.5 mg QHS    senna-docusate 8.6-50 mg per tablet 1 tablet BID PRN    simethicone chewable tablet 80 mg QID PRN    sodium chloride 0.9% flush 10 mL PRN    sodium chloride 0.9% flush 10 mL Q6H    And    sodium chloride 0.9% flush 10 mL PRN    sodium chloride 0.9% flush 10 mL Q6H PRN    sucralfate tablet 1 g TID PRN    vancomycin - pharmacy to dose pharmacy to manage frequency    white petrolatum 41 % ointment Daily      Facility-Administered Medications Ordered in Other Encounters   Medication Frequency    [MAR Hold - Suspended Admission] 0.9%  NaCl infusion (for blood administration) Q24H PRN    [MAR Hold - Suspended Admission] 0.9%  NaCl infusion PRN    [MAR Hold - Suspended Admission] 0.9%  NaCl infusion Once    [MAR Hold - Suspended Admission] acetaminophen tablet 650 mg Q4H PRN    [MAR Hold - Suspended Admission] albuterol-ipratropium 2.5 mg-0.5 mg/3 mL nebulizer solution 3 mL Q4H PRN    [MAR Hold - Suspended Admission] alteplase injection 2 mg PRN    [MAR Hold - Suspended Admission] alteplase injection 2 mg PRN    [MAR Hold - Suspended Admission] aluminum-magnesium hydroxide 200-200 mg/5 mL suspension 30 mL QID PRN    [MAR Hold - Suspended Admission] atovaquone 750 mg/5 mL oral liquid 1,500 mg Daily    [MAR Hold - Suspended Admission] B complex-vitamin C-folic acid 0.8 mg Tab 0.8 mg Daily    [MAR Hold - Suspended Admission] bisacodyL suppository 10 mg Daily PRN    [MAR Hold - Suspended Admission] carvediloL tablet 25 mg BID    celecoxib capsule 400 mg Once    [MAR Hold - Suspended Admission] cloNIDine tablet 0.1 mg Q8H PRN    [MAR Hold - Suspended Admission] dextrose 10% bolus 125 mL 125 mL PRN    [MAR Hold - Suspended Admission] dextrose 10% bolus 250 mL 250 mL PRN    [MAR Hold - Suspended Admission] epoetin hira-epbx injection 10,000 Units Every Mon, Wed, Fri    fentaNYL 50 mcg/mL injection  mcg PRN    [MAR Hold - Suspended Admission] furosemide tablet 40 mg Daily    [MAR Hold - Suspended Admission] heparin (porcine) injection 1,000 Units PRN    [MAR Hold - Suspended Admission] heparin, porcine (PF) 100 unit/mL injection flush 500 Units PRN    [MAR Hold - Suspended Admission] heparin, porcine (PF) 100 unit/mL injection flush 500 Units PRN    [MAR Hold - Suspended Admission] levothyroxine tablet 25 mcg Before breakfast    LIDOcaine (PF) 10 mg/ml (1%) injection 10 mg Once PRN    LIDOcaine (PF) 10 mg/ml (1%)  injection 10 mg Once    [MAR Hold - Suspended Admission] magnesium oxide tablet 400 mg Daily    [MAR Hold - Suspended Admission] meclizine tablet 25 mg TID PRN    [MAR Hold - Suspended Admission] melatonin tablet 6 mg Nightly PRN    [MAR Hold - Suspended Admission] methocarbamoL tablet 500 mg TID PRN    [MAR Hold - Suspended Admission] metoclopramide HCl injection 5 mg Q6H PRN    midazolam (VERSED) 1 mg/mL injection 0.5-4 mg PRN    [MAR Hold - Suspended Admission] naloxone 0.4 mg/mL injection 0.02 mg PRN    [MAR Hold - Suspended Admission] ondansetron injection 4 mg Q8H PRN    [MAR Hold - Suspended Admission] pantoprazole EC tablet 40 mg BID AC    [MAR Hold - Suspended Admission] predniSONE tablet 10 mg with lunch    [MAR Hold - Suspended Admission] predniSONE tablet 5 mg Daily    [MAR Hold - Suspended Admission] prochlorperazine injection Soln 5 mg Q6H PRN    [MAR Hold - Suspended Admission] quetiapine split tablet 12.5 mg QHS    ropivacaine 0.2% Nimbus PainPRO Pump infusion 500 ML Continuous    [MAR Hold - Suspended Admission] senna-docusate 8.6-50 mg per tablet 1 tablet BID PRN    [MAR Hold - Suspended Admission] simethicone chewable tablet 80 mg QID PRN    [MAR Hold - Suspended Admission] sodium chloride 0.9% bolus 250 mL 250 mL PRN    [MAR Hold - Suspended Admission] sodium chloride 0.9% bolus 250 mL 250 mL PRN    [MAR Hold - Suspended Admission] sodium chloride 0.9% flush 10 mL PRN    [MAR Hold - Suspended Admission] sodium chloride 0.9% flush 10 mL PRN    [MAR Hold - Suspended Admission] sodium chloride 0.9% flush 10 mL Q6H    And    [MAR Hold - Suspended Admission] sodium chloride 0.9% flush 10 mL PRN    [MAR Hold - Suspended Admission] sucralfate tablet 1 g TID PRN    [MAR Hold - Suspended Admission] vancomycin - pharmacy to dose pharmacy to manage frequency    [MAR Hold - Suspended Admission] white petrolatum 41 % ointment Daily       Objective:     Vital Signs (Most Recent):  Temp: 97.7 °F (36.5 °C)  (01/11/23 0845)  Pulse: 64 (01/11/23 0845)  Resp: 18 (01/11/23 0845)  BP: (!) 149/67 (01/11/23 0845)  SpO2: 98 % (01/11/23 0845)   Vital Signs (24h Range):  Temp:  [97.7 °F (36.5 °C)-98.3 °F (36.8 °C)] 97.7 °F (36.5 °C)  Pulse:  [61-76] 64  Resp:  [14-18] 18  SpO2:  [95 %-100 %] 98 %  BP: (102-154)/(58-86) 149/67     Weight: 59.6 kg (131 lb 6.3 oz) (01/11/23 0400)  Body mass index is 24.83 kg/m².  Body surface area is 1.6 meters squared.    I/O last 3 completed shifts:  In: 50 [P.O.:50]  Out: 300 [Urine:300]    Physical Exam  Constitutional:       General: She is not in acute distress.     Appearance: Normal appearance. She is not ill-appearing, toxic-appearing or diaphoretic.   HENT:      Head: Normocephalic and atraumatic.      Right Ear: External ear normal.      Left Ear: External ear normal.      Nose: Nose normal. No congestion or rhinorrhea.      Mouth/Throat:      Mouth: Mucous membranes are moist.      Pharynx: Oropharynx is clear. No oropharyngeal exudate or posterior oropharyngeal erythema.   Eyes:      General: No scleral icterus.     Extraocular Movements: Extraocular movements intact.      Conjunctiva/sclera: Conjunctivae normal.   Neck:      Vascular: No carotid bruit.   Cardiovascular:      Rate and Rhythm: Normal rate and regular rhythm.      Pulses: Normal pulses.      Heart sounds: Normal heart sounds. No murmur heard.    No friction rub.   Pulmonary:      Effort: Pulmonary effort is normal. No respiratory distress.      Breath sounds: Normal breath sounds. No stridor. No wheezing or rales.   Chest:      Chest wall: No tenderness.   Abdominal:      General: Abdomen is flat. Bowel sounds are normal. There is no distension.      Palpations: There is no mass.      Tenderness: There is no abdominal tenderness. There is no right CVA tenderness, left CVA tenderness or guarding.   Musculoskeletal:         General: No swelling, tenderness or deformity. Normal range of motion.      Cervical back: Normal  range of motion. No rigidity or tenderness.      Right lower leg: No edema (1+ ankle).      Left lower leg: No edema (1+ ankle).   Skin:     General: Skin is warm and dry.      Capillary Refill: Capillary refill takes less than 2 seconds.      Coloration: Skin is not jaundiced or pale.      Findings: No bruising or erythema.   Neurological:      General: No focal deficit present.      Mental Status: She is alert and oriented to person, place, and time. Mental status is at baseline.      Motor: No weakness.      Coordination: Coordination normal.   Psychiatric:         Mood and Affect: Mood normal.         Behavior: Behavior normal.         Thought Content: Thought content normal.       Significant Labs:  All labs within the past 24 hours have been reviewed.     Significant Imaging:

## 2023-01-11 NOTE — PROGRESS NOTES
Nurses Note -- 4 Eyes      1/10/2023   6:44 PM      Skin assessed during: Admit      [x] No Pressure Injuries Present    [x]Prevention Measures Documented      [] Yes- Altered Skin Integrity Present or Discovered   [] LDA Added if Not in Epic (Describe Wound)   [] New Altered Skin Integrity was Present on Admit and Documented in LDA   [] Wound Image Taken    Wound Care Consulted? No    Attending Nurse:  Charan Hammonds RN     Second RN/Staff Member: Alyson Gray LPN    CT ordered- please call pt to schedule testing and appt after test to discuss with Dr. Reich Linker

## 2023-01-12 LAB
ALBUMIN SERPL BCP-MCNC: 2.5 G/DL (ref 3.5–5.2)
ALP SERPL-CCNC: 66 U/L (ref 55–135)
ALT SERPL W/O P-5'-P-CCNC: 8 U/L (ref 10–44)
ANION GAP SERPL CALC-SCNC: 14 MMOL/L (ref 8–16)
APTT BLDCRRT: 28.8 SEC (ref 21–32)
AST SERPL-CCNC: 18 U/L (ref 10–40)
BACTERIA BLD CULT: ABNORMAL
BILIRUB SERPL-MCNC: 0.3 MG/DL (ref 0.1–1)
BUN SERPL-MCNC: 72 MG/DL (ref 8–23)
CALCIUM SERPL-MCNC: 8.5 MG/DL (ref 8.7–10.5)
CHLORIDE SERPL-SCNC: 102 MMOL/L (ref 95–110)
CO2 SERPL-SCNC: 22 MMOL/L (ref 23–29)
CREAT SERPL-MCNC: 6.7 MG/DL (ref 0.5–1.4)
EST. GFR  (NO RACE VARIABLE): 6 ML/MIN/1.73 M^2
GLUCOSE SERPL-MCNC: 84 MG/DL (ref 70–110)
INR PPP: 1.1 (ref 0.8–1.2)
MAGNESIUM SERPL-MCNC: 2.3 MG/DL (ref 1.6–2.6)
PHOSPHATE SERPL-MCNC: 5.4 MG/DL (ref 2.7–4.5)
POTASSIUM SERPL-SCNC: 4.2 MMOL/L (ref 3.5–5.1)
PROT SERPL-MCNC: 5.8 G/DL (ref 6–8.4)
PROTHROMBIN TIME: 11.1 SEC (ref 9–12.5)
SODIUM SERPL-SCNC: 138 MMOL/L (ref 136–145)
VANCOMYCIN SERPL-MCNC: 17.9 UG/ML

## 2023-01-12 PROCEDURE — 80053 COMPREHEN METABOLIC PANEL: CPT | Performed by: HOSPITALIST

## 2023-01-12 PROCEDURE — 85610 PROTHROMBIN TIME: CPT | Performed by: HOSPITALIST

## 2023-01-12 PROCEDURE — 99232 PR SUBSEQUENT HOSPITAL CARE,LEVL II: ICD-10-PCS | Mod: GC,,, | Performed by: INTERNAL MEDICINE

## 2023-01-12 PROCEDURE — 36415 COLL VENOUS BLD VENIPUNCTURE: CPT | Performed by: HOSPITALIST

## 2023-01-12 PROCEDURE — 12000002 HC ACUTE/MED SURGE SEMI-PRIVATE ROOM

## 2023-01-12 PROCEDURE — 63600175 PHARM REV CODE 636 W HCPCS: Performed by: HOSPITALIST

## 2023-01-12 PROCEDURE — A4216 STERILE WATER/SALINE, 10 ML: HCPCS | Performed by: HOSPITALIST

## 2023-01-12 PROCEDURE — 85730 THROMBOPLASTIN TIME PARTIAL: CPT | Performed by: HOSPITALIST

## 2023-01-12 PROCEDURE — 97116 GAIT TRAINING THERAPY: CPT | Mod: CQ

## 2023-01-12 PROCEDURE — 25000003 PHARM REV CODE 250: Performed by: HOSPITALIST

## 2023-01-12 PROCEDURE — 99232 SBSQ HOSP IP/OBS MODERATE 35: CPT | Mod: GC,,, | Performed by: INTERNAL MEDICINE

## 2023-01-12 PROCEDURE — 84100 ASSAY OF PHOSPHORUS: CPT | Performed by: HOSPITALIST

## 2023-01-12 PROCEDURE — 80202 ASSAY OF VANCOMYCIN: CPT | Performed by: HOSPITALIST

## 2023-01-12 PROCEDURE — 83735 ASSAY OF MAGNESIUM: CPT | Performed by: HOSPITALIST

## 2023-01-12 RX ADMIN — Medication 1 TABLET: at 09:01

## 2023-01-12 RX ADMIN — MAGNESIUM OXIDE TAB 400 MG (241.3 MG ELEMENTAL MG) 400 MG: 400 (241.3 MG) TAB at 09:01

## 2023-01-12 RX ADMIN — ATOVAQUONE 1500 MG: 750 SUSPENSION ORAL at 09:01

## 2023-01-12 RX ADMIN — PANTOPRAZOLE SODIUM 40 MG: 40 TABLET, DELAYED RELEASE ORAL at 03:01

## 2023-01-12 RX ADMIN — Medication 10 ML: at 05:01

## 2023-01-12 RX ADMIN — QUETIAPINE FUMARATE 12.5 MG: 25 TABLET ORAL at 08:01

## 2023-01-12 RX ADMIN — PREDNISONE 10 MG: 5 TABLET ORAL at 02:01

## 2023-01-12 RX ADMIN — MUPIROCIN: 20 OINTMENT TOPICAL at 09:01

## 2023-01-12 RX ADMIN — Medication 10 ML: at 12:01

## 2023-01-12 RX ADMIN — PANTOPRAZOLE SODIUM 40 MG: 40 TABLET, DELAYED RELEASE ORAL at 05:01

## 2023-01-12 RX ADMIN — LEVOTHYROXINE SODIUM 25 MCG: 25 TABLET ORAL at 05:01

## 2023-01-12 RX ADMIN — WHITE PETROLATUM: 1.75 OINTMENT TOPICAL at 09:01

## 2023-01-12 RX ADMIN — Medication 10 ML: at 08:01

## 2023-01-12 RX ADMIN — FUROSEMIDE 40 MG: 40 TABLET ORAL at 09:01

## 2023-01-12 RX ADMIN — CARVEDILOL 25 MG: 25 TABLET, FILM COATED ORAL at 08:01

## 2023-01-12 RX ADMIN — MUPIROCIN: 20 OINTMENT TOPICAL at 08:01

## 2023-01-12 NOTE — PT/OT/SLP PROGRESS
Occupational Therapy      Patient Name:  Kristin Goodman   MRN:  [id number]    Patient not seen today secondary to pt. Reported just completing PT and getting ready to go for a test. Requested OT to come tomorrow. .    1/12/2023

## 2023-01-12 NOTE — PROGRESS NOTES
Pharmacokinetic Assessment Follow Up: IV Vancomycin    Vancomycin serum concentration assessment(s)/plan:     The random level resulted as 17.9 mcg/mL (~36 hrs post dose) and can be used to guide therapy at this time. The measurement is within the desired definitive target range of 15 to 20 mcg/mL(for E.Faecalis bacteremia).  ESRD on HD (twice weekly MF).  HD line removed 1/10 with plan for line holiday while awaiting for blood cultures to clear x48 hours.  Minimal UOP.  Will not dose Vancomycin today and collect random level with AM labs on 1/13/23.  Will redose if level falls below goal of 15-20 mcg/mL or if HD restarted.   Follow up Nephro recs for future HD plan.    Drug levels (last 3 results):  Recent Labs   Lab Result Units 01/10/23  1113 01/11/23  0508 01/12/23  0552   Vancomycin, Random ug/mL 12.8 18.2 17.9         Pharmacy will continue to follow and monitor vancomycin.    Please contact pharmacy at extension [phone]  for questions regarding this assessment.    Thank you for the consult,   Paul Vee       Patient brief summary:  Kristin Goodman is a 77 y.o. female initiated on antimicrobial therapy with IV Vancomycin for treatment of bacteremia    The patient's current regimen is pulse dosing    Drug Allergies:   Review of patient's allergies indicates:   Allergen Reactions    Ampicillin     Peaches [peach (prunus persica)] Other (See Comments)     Pt unable to state type of reaction. Information obtained from daughter who states she was informed of allergy from patient.    Penicillins      Other reaction(s): Hives, anaphylaxis    Sulfa (sulfonamide antibiotics) Rash and Hives       Actual Body Weight:   59.6 kg    Renal Function:   Estimated Creatinine Clearance: 6 mL/min (A) (based on SCr of 6.7 mg/dL (H)).,     Dialysis Method (if applicable):  intermittent HD    CBC (last 72 hours):  Recent Labs   Lab Result Units 01/09/23  2031   WBC K/uL 17.47*   Hemoglobin g/dL 7.7*   Hematocrit % 25.8*    Platelets K/uL 241   Gran % % 82.0*   Lymph % % 9.0*   Mono % % 4.0   Eosinophil % % 0.0   Basophil % % 0.0   Differential Method  Manual         Metabolic Panel (last 72 hours):  Recent Labs   Lab Result Units 01/09/23  1931 01/10/23  0401 01/11/23  0508 01/12/23  0552   Sodium mmol/L 140 140 138 138   Potassium mmol/L 4.1 3.8 4.4 4.2   Chloride mmol/L 100 102 103 102   CO2 mmol/L 22* 25 20* 22*   Glucose mg/dL 123* 87 63* 84   BUN mg/dL 59* 60* 69* 72*   Creatinine mg/dL 6.6* 6.1* 6.4* 6.7*   Albumin g/dL 2.6* 2.4* 2.4* 2.5*   Total Bilirubin mg/dL 0.3 0.4 0.3 0.3   Alkaline Phosphatase U/L 80 71 66 66   AST U/L 23 16 23 18   ALT U/L 8* 6* 6* 8*   Magnesium mg/dL  --  2.1 2.2 2.3   Phosphorus mg/dL  --  4.5 5.6* 5.4*         Vancomycin Administrations:  vancomycin given in the last 96 hours                     vancomycin (VANCOCIN) 500 mg in dextrose 5 % in water (D5W) 5 % 100 mL IVPB (MB+) (mg) 500 mg New Bag 01/10/23 1629    vancomycin 1 g in 0.9% sodium chloride 250 mL IVPB (ready to mix system) (mg) 1,000 mg New Bag 01/09/23 1302                    Microbiologic Results:  Microbiology Results (last 7 days)       Procedure Component Value Units Date/Time    Blood culture [[id number]] Collected: 01/11/23 0958    Order Status: Completed Specimen: Blood Updated: 01/12/23 0744     Blood Culture, Routine No growth to date    Narrative:      Collection has been rescheduled by [email or web address] at 01/11/2023 09:23 Reason:   Patient unavailable is a hard stick [place] [id number]  Collection has been rescheduled by [email or web address] at 01/11/2023 09:23 Reason:   Patient unavailable is a hard stick [place] [id number]    Blood culture [[id number]] Collected: 01/11/23 0958    Order Status: Completed Specimen: Blood Updated: 01/12/23 0744     Blood Culture, Routine No growth to date    Narrative:      Collection has been rescheduled by [email or web address] at 01/11/2023 09:23 Reason:   Patient unavailable is a hard stick [place] [address]  Collection has been rescheduled by [email or web address] at  01/11/2023 09:23 Reason:   Patient unavailable is a hard stick [place] [id number]    Blood culture [[id number]]  (Abnormal) Collected: 01/09/23 2348    Order Status: Completed Specimen: Blood from Peripheral, Lower Arm, Right Updated: 01/11/23 0956     Blood Culture, Routine Gram stain sumi bottle: Gram positive cocci in chains resembling Strep      Results called to and read back by: Ej Haley RN  01/10/2023  15:49      ENTEROCOCCUS FAECALIS  For susceptibility see order #[id number]      Narrative:      Right Peripheral    Blood culture [[id number]]  (Abnormal) Collected: 01/09/23 2348    Order Status: Completed Specimen: Blood from Peripheral, Lower Arm, Left Updated: 01/11/23 0955     Blood Culture, Routine Gram stain sumi bottle: Gram positive cocci in chains resembling Strep      Positive results previously called 01/10/2023      ENTEROCOCCUS FAECALIS  Susceptibility pending      Narrative:      Left Peripheral    Blood culture [[id number]]     Order Status: Canceled Specimen: Blood     Blood culture [[id number]]     Order Status: Canceled Specimen: Blood     Blood culture [[id number]]     Order Status: Canceled Specimen: Blood

## 2023-01-12 NOTE — SUBJECTIVE & OBJECTIVE
Interval History:     No acute events overnight. Making Urine per patient. No SOB, or peripheral edema. Blood cultures negative x 1 day.     Review of patient's allergies indicates:   Allergen Reactions    Ampicillin     Peaches [peach (prunus persica)] Other (See Comments)     Pt unable to state type of reaction. Information obtained from daughter who states she was informed of allergy from patient.    Penicillins      Other reaction(s): Hives, anaphylaxis    Sulfa (sulfonamide antibiotics) Rash and Hives     Current Facility-Administered Medications   Medication Frequency    acetaminophen tablet 650 mg Q4H PRN    albuterol-ipratropium 2.5 mg-0.5 mg/3 mL nebulizer solution 3 mL Q4H PRN    aluminum-magnesium hydroxide 200-200 mg/5 mL suspension 30 mL QID PRN    atovaquone 750 mg/5 mL oral liquid 1,500 mg Daily    B-complex with vitamin C tablet 1 tablet Daily    bisacodyL suppository 10 mg Daily PRN    carvediloL tablet 25 mg BID    cloNIDine tablet 0.1 mg Q8H PRN    furosemide tablet 40 mg Daily    heparin (porcine) injection 1,000 Units PRN    levothyroxine tablet 25 mcg Before breakfast    magnesium oxide tablet 400 mg Daily    meclizine tablet 25 mg TID PRN    melatonin tablet 6 mg Nightly PRN    methocarbamoL tablet 500 mg TID PRN    mupirocin 2 % ointment BID    naloxone 0.4 mg/mL injection 0.02 mg PRN    ondansetron injection 4 mg Q8H PRN    pantoprazole EC tablet 40 mg BID AC    predniSONE tablet 10 mg with lunch    [START ON 1/15/2023] predniSONE tablet 5 mg Daily    prochlorperazine injection Soln 5 mg Q6H PRN    quetiapine split tablet 12.5 mg QHS    senna-docusate 8.6-50 mg per tablet 1 tablet BID PRN    simethicone chewable tablet 80 mg QID PRN    sodium chloride 0.9% flush 10 mL PRN    sodium chloride 0.9% flush 10 mL Q6H    And    sodium chloride 0.9% flush 10 mL PRN    sodium chloride 0.9% flush 10 mL Q6H PRN    sucralfate tablet 1 g TID PRN    vancomycin - pharmacy to dose pharmacy to manage  frequency    white petrolatum 41 % ointment Daily     Facility-Administered Medications Ordered in Other Encounters   Medication Frequency    [MAR Hold - Suspended Admission] 0.9%  NaCl infusion (for blood administration) Q24H PRN    [MAR Hold - Suspended Admission] 0.9%  NaCl infusion PRN    [MAR Hold - Suspended Admission] 0.9%  NaCl infusion Once    [MAR Hold - Suspended Admission] acetaminophen tablet 650 mg Q4H PRN    [MAR Hold - Suspended Admission] albuterol-ipratropium 2.5 mg-0.5 mg/3 mL nebulizer solution 3 mL Q4H PRN    [MAR Hold - Suspended Admission] alteplase injection 2 mg PRN    [MAR Hold - Suspended Admission] alteplase injection 2 mg PRN    [MAR Hold - Suspended Admission] aluminum-magnesium hydroxide 200-200 mg/5 mL suspension 30 mL QID PRN    [MAR Hold - Suspended Admission] atovaquone 750 mg/5 mL oral liquid 1,500 mg Daily    [MAR Hold - Suspended Admission] B complex-vitamin C-folic acid 0.8 mg Tab 0.8 mg Daily    [MAR Hold - Suspended Admission] bisacodyL suppository 10 mg Daily PRN    [MAR Hold - Suspended Admission] carvediloL tablet 25 mg BID    celecoxib capsule 400 mg Once    [MAR Hold - Suspended Admission] cloNIDine tablet 0.1 mg Q8H PRN    [MAR Hold - Suspended Admission] dextrose 10% bolus 125 mL 125 mL PRN    [MAR Hold - Suspended Admission] dextrose 10% bolus 250 mL 250 mL PRN    [MAR Hold - Suspended Admission] epoetin hira-epbx injection 10,000 Units Every Mon, Wed, Fri    fentaNYL 50 mcg/mL injection  mcg PRN    [MAR Hold - Suspended Admission] furosemide tablet 40 mg Daily    [MAR Hold - Suspended Admission] heparin (porcine) injection 1,000 Units PRN    [MAR Hold - Suspended Admission] heparin, porcine (PF) 100 unit/mL injection flush 500 Units PRN    [MAR Hold - Suspended Admission] heparin, porcine (PF) 100 unit/mL injection flush 500 Units PRN    [MAR Hold - Suspended Admission] levothyroxine tablet 25 mcg Before breakfast    LIDOcaine (PF) 10 mg/ml (1%) injection 10 mg  Once PRN    LIDOcaine (PF) 10 mg/ml (1%) injection 10 mg Once    [MAR Hold - Suspended Admission] magnesium oxide tablet 400 mg Daily    [MAR Hold - Suspended Admission] meclizine tablet 25 mg TID PRN    [MAR Hold - Suspended Admission] melatonin tablet 6 mg Nightly PRN    [MAR Hold - Suspended Admission] methocarbamoL tablet 500 mg TID PRN    [MAR Hold - Suspended Admission] metoclopramide HCl injection 5 mg Q6H PRN    midazolam (VERSED) 1 mg/mL injection 0.5-4 mg PRN    [MAR Hold - Suspended Admission] naloxone 0.4 mg/mL injection 0.02 mg PRN    [MAR Hold - Suspended Admission] ondansetron injection 4 mg Q8H PRN    [MAR Hold - Suspended Admission] pantoprazole EC tablet 40 mg BID AC    [MAR Hold - Suspended Admission] predniSONE tablet 10 mg with lunch    [MAR Hold - Suspended Admission] predniSONE tablet 5 mg Daily    [MAR Hold - Suspended Admission] prochlorperazine injection Soln 5 mg Q6H PRN    [MAR Hold - Suspended Admission] quetiapine split tablet 12.5 mg QHS    ropivacaine 0.2% [facility] PainPRO Pump infusion 500 ML Continuous    [MAR Hold - Suspended Admission] senna-docusate 8.6-50 mg per tablet 1 tablet BID PRN    [MAR Hold - Suspended Admission] simethicone chewable tablet 80 mg QID PRN    [MAR Hold - Suspended Admission] sodium chloride 0.9% bolus 250 mL 250 mL PRN    [MAR Hold - Suspended Admission] sodium chloride 0.9% bolus 250 mL 250 mL PRN    [MAR Hold - Suspended Admission] sodium chloride 0.9% flush 10 mL PRN    [MAR Hold - Suspended Admission] sodium chloride 0.9% flush 10 mL PRN    [MAR Hold - Suspended Admission] sodium chloride 0.9% flush 10 mL Q6H    And    [MAR Hold - Suspended Admission] sodium chloride 0.9% flush 10 mL PRN    [MAR Hold - Suspended Admission] sucralfate tablet 1 g TID PRN    [MAR Hold - Suspended Admission] vancomycin - pharmacy to dose pharmacy to manage frequency    [MAR Hold - Suspended Admission] white petrolatum 41 % ointment Daily       Objective:     Vital Signs  (Most Recent):  Temp: 97.8 °F (36.6 °C) (01/12/23 0755)  Pulse: 69 (01/12/23 0755)  Resp: 17 (01/12/23 0755)  BP: (!) 118/58 (01/12/23 0755)  SpO2: 97 % (01/12/23 0755)   Vital Signs (24h Range):  Temp:  [97.7 °F (36.5 °C)-98.3 °F (36.8 °C)] 97.8 °F (36.6 °C)  Pulse:  [69-73] 69  Resp:  [17-18] 17  SpO2:  [95 %-98 %] 97 %  BP: ()/(52-64) 118/58     Weight: 60 kg (132 lb 4.4 oz) (01/12/23 0400)  Body mass index is 24.99 kg/m².  Body surface area is 1.61 meters squared.    I/O last 3 completed shifts:  In: 50 [P.O.:50]  Out: -     Physical Exam  Constitutional:       Appearance: She is not ill-appearing or toxic-appearing.   HENT:      Head: Normocephalic.      Mouth/Throat:      Mouth: Mucous membranes are moist.   Eyes:      Conjunctiva/sclera: Conjunctivae normal.   Cardiovascular:      Rate and Rhythm: Normal rate and regular rhythm.      Heart sounds: No murmur heard.  Pulmonary:      Effort: Pulmonary effort is normal. No respiratory distress.      Breath sounds: No wheezing or rales.   Abdominal:      General: Abdomen is flat. Bowel sounds are normal.      Tenderness: There is no abdominal tenderness.   Musculoskeletal:      Right lower leg: No edema (1+ ankle).      Left lower leg: No edema (1+ ankle).   Skin:     General: Skin is warm and dry.      Findings: No erythema.      Comments: Right upper chest tunneled catheter removed. LUE midline in place   Neurological:      General: No focal deficit present.      Mental Status: She is alert and oriented to person, place, and time. Mental status is at baseline.   Psychiatric:         Mood and Affect: Mood normal.       Significant Labs:  All labs within the past 24 hours have been reviewed.     Significant Imaging:  Labs: Reviewed

## 2023-01-12 NOTE — ASSESSMENT & PLAN NOTE
- management per ID and primary  - will need to have dialysis line replaced after BC negative x 48hrs per ID recs.

## 2023-01-12 NOTE — CONSULTS
"PharmD Consult received for:   1.) Admit medication history/reconciliation: Completed; please see- "Admission Medication Reconciliation - Pharmacy Consult Note on 1/10/23"     2.) Renally adjust all medications:  Estimated Creatinine Clearance: 6 mL/min (A) (based on SCr of 6.7 mg/dL (H)).    Medications reviewed.  No dose adjustments needed.   ESRD on HD twice weekly (M/F); on line holiday - HD line removed 1/10/23    Thank you for the consult,  Paul Vee, PharmD  Ext [phone]     **Note: Consults are reviewed Monday-Friday 7:00am-3:30pm. The above recommendations are only suggested. The recommendations should be considered in conjunction with all patient factors.**   "

## 2023-01-12 NOTE — ASSESSMENT & PLAN NOTE
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 Now iHD for which she receives HD on one but usually twice weekly for metabolic clearance via tunneled HD catheter roughly x2 a week with last HD Friday (01/06/2023).    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  3)  Focal acute pyelonephritis.  4)  Mild-to-moderate arterionephrosclerosis    Plan/Recommendations:  - no indication for urgent RRT at this time.   - okay for line holiday while awaiting for blood cultures to clear x48 hours  - can continue Lasix 40 mg daily for now   - currently on prednisone taper 10 mg daily x5 days followed by 5 mg daily with atovaquone 1.5 grams faily for PJP prophylaxis   - renal diet if not NPO  - strict I/Os and daily weights  - daily RFPs and magnesium level  - renally dose all medications to eGFR  - avoid nephrotoxic agents when feasible (i.e. intra-arterial contrast, NSAIDs, supra-therapeutic vancomycin troughs, etc.)

## 2023-01-12 NOTE — PLAN OF CARE
"Secure chat to Dr. Dixon "Good morning, it looks like they are recommending inpt rehab for her.  She has [facility] insurance, who is likely to deny rehab.  Do you feel that this patient needs to see a physician at least 3x week?  Could you justify rehab in a peer to peer if it were needed?"    Md responded she will review and let CM know.    ALIZA ToussaintN, BS, RN, CCM    "

## 2023-01-12 NOTE — PLAN OF CARE
Chart reviewed - repeat blcx post-line removal ngtd so far. CT abdo pending. TTE negative.     Recommendations:  -continue vancomycin pharm to dose for Efaecalis bacteremia  -follow up CT abdo to evaluate for potential soure

## 2023-01-12 NOTE — PROGRESS NOTES
[facility] ([place] [id number])  Nephrology  Progress Note    Patient Name: Kristin Goodman  MRN: [id number]  Admission Date: 1/9/2023  Hospital Length of Stay: 3 days  Attending Provider: Kitty Dixon MD   Primary Care Physician: Lori Hernandez MD  Principal Problem:Bacteremia due to Enterococcus    Subjective:     HPI: Ms Goodman is a 77-year-old woman with hypertension, hypothyroidism, HFpEF (most recent TTE with EF 65% with G1DD), pulmonary hypertension, current ESBL E. coli UTI, osteoarthritis, chronic back pain, DONAVAN and microscopic polyangiitis complicated base on biopsy from 10/26 by renal failure, GIB and respiratory failure with history of diffuse alveolar hemorrhage s/p IV Solumedrol, PLEX x5 sessions, Rituximab x4 doses and cyclophosphamide however now  just on steroid taper (cyclophosphamide appears to be hold secondary to anemia) now iHD dependent twice weekly via tunneled HD catheter for metabolic clearance (follows with Dr. Mojica with [facility]) who presented from [facility] for TDC removal in the setting of positive blood cultures growing Enterococcus faecalis and Bacillus species from cultures obtained on 1/5 & 1/7. In addition patient with reported syncopal event and sluggish flows on HD on 1/9 (incomplete session, daughter attributes to iron infusion) with last full session of iHD being on 1/6. She reports still making good urine without any urinary complaints today. Nephrology has been consulted for inpatient HD needs.      Interval History:     No acute events overnight. Making Urine per patient. No SOB, or peripheral edema. Blood cultures negative x 1 day.     Review of patient's allergies indicates:   Allergen Reactions    Ampicillin     Peaches [peach (prunus persica)] Other (See Comments)     Pt unable to state type of reaction. Information obtained from daughter who states she was informed of allergy from patient.    Penicillins      Other reaction(s): Hives,  anaphylaxis    Sulfa (sulfonamide antibiotics) Rash and Hives     Current Facility-Administered Medications   Medication Frequency    acetaminophen tablet 650 mg Q4H PRN    albuterol-ipratropium 2.5 mg-0.5 mg/3 mL nebulizer solution 3 mL Q4H PRN    aluminum-magnesium hydroxide 200-200 mg/5 mL suspension 30 mL QID PRN    atovaquone 750 mg/5 mL oral liquid 1,500 mg Daily    B-complex with vitamin C tablet 1 tablet Daily    bisacodyL suppository 10 mg Daily PRN    carvediloL tablet 25 mg BID    cloNIDine tablet 0.1 mg Q8H PRN    furosemide tablet 40 mg Daily    heparin (porcine) injection 1,000 Units PRN    levothyroxine tablet 25 mcg Before breakfast    magnesium oxide tablet 400 mg Daily    meclizine tablet 25 mg TID PRN    melatonin tablet 6 mg Nightly PRN    methocarbamoL tablet 500 mg TID PRN    mupirocin 2 % ointment BID    naloxone 0.4 mg/mL injection 0.02 mg PRN    ondansetron injection 4 mg Q8H PRN    pantoprazole EC tablet 40 mg BID AC    predniSONE tablet 10 mg with lunch    [START ON 1/15/2023] predniSONE tablet 5 mg Daily    prochlorperazine injection Soln 5 mg Q6H PRN    quetiapine split tablet 12.5 mg QHS    senna-docusate 8.6-50 mg per tablet 1 tablet BID PRN    simethicone chewable tablet 80 mg QID PRN    sodium chloride 0.9% flush 10 mL PRN    sodium chloride 0.9% flush 10 mL Q6H    And    sodium chloride 0.9% flush 10 mL PRN    sodium chloride 0.9% flush 10 mL Q6H PRN    sucralfate tablet 1 g TID PRN    vancomycin - pharmacy to dose pharmacy to manage frequency    white petrolatum 41 % ointment Daily     Facility-Administered Medications Ordered in Other Encounters   Medication Frequency    [MAR Hold - Suspended Admission] 0.9%  NaCl infusion (for blood administration) Q24H PRN    [MAR Hold - Suspended Admission] 0.9%  NaCl infusion PRN    [MAR Hold - Suspended Admission] 0.9%  NaCl infusion Once    [MAR Hold - Suspended Admission] acetaminophen tablet 650 mg Q4H  PRN    [MAR Hold - Suspended Admission] albuterol-ipratropium 2.5 mg-0.5 mg/3 mL nebulizer solution 3 mL Q4H PRN    [MAR Hold - Suspended Admission] alteplase injection 2 mg PRN    [MAR Hold - Suspended Admission] alteplase injection 2 mg PRN    [MAR Hold - Suspended Admission] aluminum-magnesium hydroxide 200-200 mg/5 mL suspension 30 mL QID PRN    [MAR Hold - Suspended Admission] atovaquone 750 mg/5 mL oral liquid 1,500 mg Daily    [MAR Hold - Suspended Admission] B complex-vitamin C-folic acid 0.8 mg Tab 0.8 mg Daily    [MAR Hold - Suspended Admission] bisacodyL suppository 10 mg Daily PRN    [MAR Hold - Suspended Admission] carvediloL tablet 25 mg BID    celecoxib capsule 400 mg Once    [MAR Hold - Suspended Admission] cloNIDine tablet 0.1 mg Q8H PRN    [MAR Hold - Suspended Admission] dextrose 10% bolus 125 mL 125 mL PRN    [MAR Hold - Suspended Admission] dextrose 10% bolus 250 mL 250 mL PRN    [MAR Hold - Suspended Admission] epoetin hira-epbx injection 10,000 Units Every Mon, Wed, Fri    fentaNYL 50 mcg/mL injection  mcg PRN    [MAR Hold - Suspended Admission] furosemide tablet 40 mg Daily    [MAR Hold - Suspended Admission] heparin (porcine) injection 1,000 Units PRN    [MAR Hold - Suspended Admission] heparin, porcine (PF) 100 unit/mL injection flush 500 Units PRN    [MAR Hold - Suspended Admission] heparin, porcine (PF) 100 unit/mL injection flush 500 Units PRN    [MAR Hold - Suspended Admission] levothyroxine tablet 25 mcg Before breakfast    LIDOcaine (PF) 10 mg/ml (1%) injection 10 mg Once PRN    LIDOcaine (PF) 10 mg/ml (1%) injection 10 mg Once    [MAR Hold - Suspended Admission] magnesium oxide tablet 400 mg Daily    [MAR Hold - Suspended Admission] meclizine tablet 25 mg TID PRN    [MAR Hold - Suspended Admission] melatonin tablet 6 mg Nightly PRN    [MAR Hold - Suspended Admission] methocarbamoL tablet 500 mg TID PRN    [MAR Hold - Suspended Admission] metoclopramide  HCl injection 5 mg Q6H PRN    midazolam (VERSED) 1 mg/mL injection 0.5-4 mg PRN    [MAR Hold - Suspended Admission] naloxone 0.4 mg/mL injection 0.02 mg PRN    [MAR Hold - Suspended Admission] ondansetron injection 4 mg Q8H PRN    [MAR Hold - Suspended Admission] pantoprazole EC tablet 40 mg BID AC    [MAR Hold - Suspended Admission] predniSONE tablet 10 mg with lunch    [MAR Hold - Suspended Admission] predniSONE tablet 5 mg Daily    [MAR Hold - Suspended Admission] prochlorperazine injection Soln 5 mg Q6H PRN    [MAR Hold - Suspended Admission] quetiapine split tablet 12.5 mg QHS    ropivacaine 0.2% [facility] PainPRO Pump infusion 500 ML Continuous    [MAR Hold - Suspended Admission] senna-docusate 8.6-50 mg per tablet 1 tablet BID PRN    [MAR Hold - Suspended Admission] simethicone chewable tablet 80 mg QID PRN    [MAR Hold - Suspended Admission] sodium chloride 0.9% bolus 250 mL 250 mL PRN    [MAR Hold - Suspended Admission] sodium chloride 0.9% bolus 250 mL 250 mL PRN    [MAR Hold - Suspended Admission] sodium chloride 0.9% flush 10 mL PRN    [MAR Hold - Suspended Admission] sodium chloride 0.9% flush 10 mL PRN    [MAR Hold - Suspended Admission] sodium chloride 0.9% flush 10 mL Q6H    And    [MAR Hold - Suspended Admission] sodium chloride 0.9% flush 10 mL PRN    [MAR Hold - Suspended Admission] sucralfate tablet 1 g TID PRN    [MAR Hold - Suspended Admission] vancomycin - pharmacy to dose pharmacy to manage frequency    [MAR Hold - Suspended Admission] white petrolatum 41 % ointment Daily       Objective:     Vital Signs (Most Recent):  Temp: 97.8 °F (36.6 °C) (01/12/23 0755)  Pulse: 69 (01/12/23 0755)  Resp: 17 (01/12/23 0755)  BP: (!) 118/58 (01/12/23 0755)  SpO2: 97 % (01/12/23 0755)   Vital Signs (24h Range):  Temp:  [97.7 °F (36.5 °C)-98.3 °F (36.8 °C)] 97.8 °F (36.6 °C)  Pulse:  [69-73] 69  Resp:  [17-18] 17  SpO2:  [95 %-98 %] 97 %  BP: ()/(52-64) 118/58     Weight: 60 kg (132 lb  4.4 oz) (01/12/23 0400)  Body mass index is 24.99 kg/m².  Body surface area is 1.61 meters squared.    I/O last 3 completed shifts:  In: 50 [P.O.:50]  Out: -     Physical Exam  Constitutional:       Appearance: She is not ill-appearing or toxic-appearing.   HENT:      Head: Normocephalic.      Mouth/Throat:      Mouth: Mucous membranes are moist.   Eyes:      Conjunctiva/sclera: Conjunctivae normal.   Cardiovascular:      Rate and Rhythm: Normal rate and regular rhythm.      Heart sounds: No murmur heard.  Pulmonary:      Effort: Pulmonary effort is normal. No respiratory distress.      Breath sounds: No wheezing or rales.   Abdominal:      General: Abdomen is flat. Bowel sounds are normal.      Tenderness: There is no abdominal tenderness.   Musculoskeletal:      Right lower leg: No edema (1+ ankle).      Left lower leg: No edema (1+ ankle).   Skin:     General: Skin is warm and dry.      Findings: No erythema.      Comments: Right upper chest tunneled catheter removed. LUE midline in place   Neurological:      General: No focal deficit present.      Mental Status: She is alert and oriented to person, place, and time. Mental status is at baseline.   Psychiatric:         Mood and Affect: Mood normal.       Significant Labs:  All labs within the past 24 hours have been reviewed.     Significant Imaging:  Labs: Reviewed    Assessment/Plan:     * Bacteremia due to Enterococcus  - management per ID and primary  - will need to have dialysis line replaced after BC negative x 48hrs per ID recs.     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 Now iHD for which she receives HD on one but usually twice weekly for metabolic clearance via tunneled HD catheter roughly x2 a week with last HD Friday (01/06/2023).    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  2)  Necrotizing cresentic glomerulonephritis/[id number] necrotizing cresecentic glomerulonephritis.  3)  Focal acute pyelonephritis.  4)  Mild-to-moderate arterionephrosclerosis    Plan/Recommendations:  - no indication for urgent RRT at this time.   - okay for line holiday while awaiting for blood cultures to clear x48 hours  - can continue Lasix 40 mg daily for now   - currently on prednisone taper 10 mg daily x5 days followed by 5 mg daily with atovaquone 1.5 grams faily for PJP prophylaxis   - renal diet if not NPO  - strict I/Os and daily weights  - daily RFPs and magnesium level  - renally dose all medications to eGFR  - avoid nephrotoxic agents when feasible (i.e. intra-arterial contrast, NSAIDs, supra-therapeutic vancomycin troughs, etc.)        Thank you for your consult. I will follow-up with patient. Please contact us if you have any additional questions.     Case discussed with attending. Attestation to follow.       Chris Leyva,   Nephrology  [facility] ([place]

## 2023-01-13 LAB
ALBUMIN SERPL BCP-MCNC: 2.6 G/DL (ref 3.5–5.2)
ANION GAP SERPL CALC-SCNC: 18 MMOL/L (ref 8–16)
BUN SERPL-MCNC: 76 MG/DL (ref 8–23)
CALCIUM SERPL-MCNC: 8.6 MG/DL (ref 8.7–10.5)
CHLORIDE SERPL-SCNC: 104 MMOL/L (ref 95–110)
CO2 SERPL-SCNC: 18 MMOL/L (ref 23–29)
CREAT SERPL-MCNC: 7 MG/DL (ref 0.5–1.4)
EST. GFR  (NO RACE VARIABLE): 5.7 ML/MIN/1.73 M^2
GLUCOSE SERPL-MCNC: 84 MG/DL (ref 70–110)
PHOSPHATE SERPL-MCNC: 5.5 MG/DL (ref 2.7–4.5)
POTASSIUM SERPL-SCNC: 4.3 MMOL/L (ref 3.5–5.1)
SODIUM SERPL-SCNC: 140 MMOL/L (ref 136–145)
VANCOMYCIN SERPL-MCNC: 16.7 UG/ML

## 2023-01-13 PROCEDURE — 25000003 PHARM REV CODE 250: Performed by: HOSPITALIST

## 2023-01-13 PROCEDURE — 99232 PR SUBSEQUENT HOSPITAL CARE,LEVL II: ICD-10-PCS | Mod: ,,, | Performed by: STUDENT IN AN ORGANIZED HEALTH CARE EDUCATION/TRAINING PROGRAM

## 2023-01-13 PROCEDURE — A4216 STERILE WATER/SALINE, 10 ML: HCPCS | Performed by: HOSPITALIST

## 2023-01-13 PROCEDURE — 80202 ASSAY OF VANCOMYCIN: CPT | Performed by: HOSPITALIST

## 2023-01-13 PROCEDURE — 99232 SBSQ HOSP IP/OBS MODERATE 35: CPT | Mod: ,,, | Performed by: INTERNAL MEDICINE

## 2023-01-13 PROCEDURE — 36415 COLL VENOUS BLD VENIPUNCTURE: CPT | Performed by: STUDENT IN AN ORGANIZED HEALTH CARE EDUCATION/TRAINING PROGRAM

## 2023-01-13 PROCEDURE — 99233 SBSQ HOSP IP/OBS HIGH 50: CPT | Mod: ,,, | Performed by: PHYSICIAN ASSISTANT

## 2023-01-13 PROCEDURE — 99232 SBSQ HOSP IP/OBS MODERATE 35: CPT | Mod: ,,, | Performed by: STUDENT IN AN ORGANIZED HEALTH CARE EDUCATION/TRAINING PROGRAM

## 2023-01-13 PROCEDURE — 12000002 HC ACUTE/MED SURGE SEMI-PRIVATE ROOM

## 2023-01-13 PROCEDURE — 99232 PR SUBSEQUENT HOSPITAL CARE,LEVL II: ICD-10-PCS | Mod: ,,, | Performed by: INTERNAL MEDICINE

## 2023-01-13 PROCEDURE — 97110 THERAPEUTIC EXERCISES: CPT

## 2023-01-13 PROCEDURE — 36415 COLL VENOUS BLD VENIPUNCTURE: CPT | Performed by: HOSPITALIST

## 2023-01-13 PROCEDURE — 25500020 PHARM REV CODE 255: Performed by: STUDENT IN AN ORGANIZED HEALTH CARE EDUCATION/TRAINING PROGRAM

## 2023-01-13 PROCEDURE — 63600175 PHARM REV CODE 636 W HCPCS: Performed by: HOSPITALIST

## 2023-01-13 PROCEDURE — 80069 RENAL FUNCTION PANEL: CPT | Performed by: STUDENT IN AN ORGANIZED HEALTH CARE EDUCATION/TRAINING PROGRAM

## 2023-01-13 PROCEDURE — 99233 PR SUBSEQUENT HOSPITAL CARE,LEVL III: ICD-10-PCS | Mod: ,,, | Performed by: PHYSICIAN ASSISTANT

## 2023-01-13 RX ADMIN — MUPIROCIN: 20 OINTMENT TOPICAL at 10:01

## 2023-01-13 RX ADMIN — Medication 1 TABLET: at 02:01

## 2023-01-13 RX ADMIN — Medication 10 ML: at 10:01

## 2023-01-13 RX ADMIN — QUETIAPINE FUMARATE 12.5 MG: 25 TABLET ORAL at 10:01

## 2023-01-13 RX ADMIN — WHITE PETROLATUM: 1.75 OINTMENT TOPICAL at 09:01

## 2023-01-13 RX ADMIN — MUPIROCIN: 20 OINTMENT TOPICAL at 09:01

## 2023-01-13 RX ADMIN — CARVEDILOL 25 MG: 25 TABLET, FILM COATED ORAL at 10:01

## 2023-01-13 RX ADMIN — Medication 10 ML: at 06:01

## 2023-01-13 RX ADMIN — LEVOTHYROXINE SODIUM 25 MCG: 25 TABLET ORAL at 06:01

## 2023-01-13 RX ADMIN — MAGNESIUM OXIDE TAB 400 MG (241.3 MG ELEMENTAL MG) 400 MG: 400 (241.3 MG) TAB at 09:01

## 2023-01-13 RX ADMIN — Medication 10 ML: at 12:01

## 2023-01-13 RX ADMIN — FUROSEMIDE 40 MG: 40 TABLET ORAL at 09:01

## 2023-01-13 RX ADMIN — ATOVAQUONE 1500 MG: 750 SUSPENSION ORAL at 09:01

## 2023-01-13 RX ADMIN — PREDNISONE 10 MG: 5 TABLET ORAL at 02:01

## 2023-01-13 RX ADMIN — IOHEXOL 15 ML: 350 INJECTION, SOLUTION INTRAVENOUS at 12:01

## 2023-01-13 RX ADMIN — PANTOPRAZOLE SODIUM 40 MG: 40 TABLET, DELAYED RELEASE ORAL at 06:01

## 2023-01-13 RX ADMIN — IOHEXOL 15 ML: 350 INJECTION, SOLUTION INTRAVENOUS at 01:01

## 2023-01-13 RX ADMIN — PANTOPRAZOLE SODIUM 40 MG: 40 TABLET, DELAYED RELEASE ORAL at 04:01

## 2023-01-13 RX ADMIN — CARVEDILOL 25 MG: 25 TABLET, FILM COATED ORAL at 09:01

## 2023-01-13 NOTE — SUBJECTIVE & OBJECTIVE
Interval History: no major events, bacteremia evaluation underway. Scheduled for ct cap and echo today. Hd on hold due to line holiday. Voices no complaints/concerns.    Review of Systems   Constitutional:  Negative for chills and fatigue.   HENT:  Negative for congestion.    Respiratory:  Negative for shortness of breath.    Cardiovascular:  Negative for chest pain.   Gastrointestinal:  Negative for abdominal pain, diarrhea, nausea and vomiting.   All other systems reviewed and are negative.  Objective:     Vital Signs (Most Recent):  Temp: 98.5 °F (36.9 °C) (01/12/23 2039)  Pulse: 69 (01/12/23 2039)  Resp: 17 (01/12/23 2039)  BP: 137/62 (01/12/23 2039)  SpO2: 96 % (01/12/23 2039) Vital Signs (24h Range):  Temp:  [97.7 °F (36.5 °C)-98.5 °F (36.9 °C)] 98.5 °F (36.9 °C)  Pulse:  [64-72] 69  Resp:  [17-18] 17  SpO2:  [92 %-97 %] 96 %  BP: (103-137)/(56-64) 137/62     Weight: 60 kg (132 lb 4.4 oz)  Body mass index is 24.99 kg/m².    Intake/Output Summary (Last 24 hours) at 1/12/2023 2049  Last data filed at 1/12/2023 1741  Gross per 24 hour   Intake 480 ml   Output --   Net 480 ml      Physical Exam  Vitals reviewed.   Constitutional:       Appearance: She is not toxic-appearing or diaphoretic.   HENT:      Head: Normocephalic and atraumatic.   Eyes:      General: No scleral icterus.     Extraocular Movements: Extraocular movements intact.   Cardiovascular:      Rate and Rhythm: Normal rate and regular rhythm.      Pulses: Normal pulses.      Heart sounds: No murmur heard.  Pulmonary:      Effort: Pulmonary effort is normal. No respiratory distress.   Abdominal:      General: Abdomen is flat.      Palpations: Abdomen is soft.      Tenderness: There is no abdominal tenderness.   Musculoskeletal:      Right lower leg: No edema.      Left lower leg: No edema.   Skin:     General: Skin is warm.   Neurological:      General: No focal deficit present.      Mental Status: She is alert. Mental status is at baseline.    Psychiatric:         Behavior: Behavior normal. Behavior is cooperative.         Thought Content: Thought content normal.       Significant Labs: All pertinent labs within the past 24 hours have been reviewed.  Recent Lab Results         01/12/23  0552        Albumin 2.5       Alkaline Phosphatase 66       ALT 8       Anion Gap 14       aPTT 28.8  Comment: aPTT therapeutic range = 39-69 seconds       AST 18       BILIRUBIN TOTAL 0.3  Comment: For infants and newborns, interpretation of results should be based  on gestational age, weight and in agreement with clinical  observations.    Premature Infant recommended reference ranges:  Up to 24 hours.............<8.0 mg/dL  Up to 48 hours............<12.0 mg/dL  3-5 days..................<15.0 mg/dL  6-29 days.................<15.0 mg/dL         BUN 72       Calcium 8.5       Chloride 102       CO2 22       Creatinine 6.7       eGFR 6.0       Glucose 84       INR 1.1  Comment: Coumadin Therapy:  2.0 - 3.0 for INR for all indicators except mechanical heart valves  and antiphospholipid syndromes which should use 2.5 - 3.5.         Magnesium 2.3       Phosphorus 5.4       Potassium 4.2       PROTEIN TOTAL 5.8       Protime 11.1       Sodium 138       Vancomycin, Random 17.9               Significant Imaging: I have reviewed all pertinent imaging results/findings within the past 24 hours.

## 2023-01-13 NOTE — ASSESSMENT & PLAN NOTE
Suspect source prior HD line vs transient gut translocation in setting of abdominal pain, fever, and diarrhea. Denies further abdominal pain/diarrhea. HD line removed 1/10 - repeat blcx obtained post-line removal ngtd. TTE negative and CT abdo/pelvis pending.     Recommendations:  -continue vancomycin pharmacy to dose  -follow up CT abdo/pelvis  -anticipate 2w course of abx if CT scan is unrevealing  -ok to replace HD line as long as repeat blcx have been ngtd x 48 hr

## 2023-01-13 NOTE — PROGRESS NOTES
[facility] - Intensive Care ([place] [address])  Infectious Disease  Progress Note    Patient Name: Kristin Goodman  MRN: [id number]  Admission Date: 1/9/2023  Length of Stay: 4 days  Attending Physician: Kitty Dixon MD  Primary Care Provider: Lroi Hernandez MD    Isolation Status: No active isolations  Assessment/Plan:      * Bacteremia due to Enterococcus  Suspect source prior HD line vs transient gut translocation in setting of abdominal pain, fever, and diarrhea. Denies further abdominal pain/diarrhea. HD line removed 1/10 - repeat blcx obtained post-line removal ngtd. TTE negative and CT abdo/pelvis pending.     Recommendations:  -continue vancomycin pharmacy to dose  -follow up CT abdo/pelvis  -anticipate 2w course of abx if CT scan is unrevealing  -ok to replace HD line as long as repeat blcx have been ngtd x 48 hr    Microscopic polyangiitis  Continue atovaquone therapy as long as patient is on chronic steroids           Thank you for your consult. I will follow-up with patient. Please contact us if you have any additional questions. Above d/w primary team.       Time: 35 minutes   50% of time spent on face-to-face counseling and coordination of care. Counseling included review of test results, diagnosis, and treatment plan with patient and/or family.        Kimberley Love MD  Infectious Disease  [facility] - Intensive [facility] ([place] [address])    Subjective:     Principal Problem:Bacteremia due to Enterococcus    HPI: This is a 77 year old lady with recently diagnosed ESRD 2/2 microscopic polyangiitis, HFpEF, and HTN who was transferred from [facility] for management of bacteremia. 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 Patient required dialysis through a tunneled line. Patient was started on IV steroids, Rituximab, and cyclophosphamide and was discharged to LTAC with  plans to go to rehab after. At [facility], patient had some diarrhea and fevers. Of note, there was documentation that the tunneled line was out a couple of centimeters and there was concern for infection. Ucx grew ESBL Ecoli and blood cultures grew pansensitive E. Faecalis. patient was seen by Dr. Calvert who recommended ertapenam and Vancomycin with hospital admission to remove tunneled HD line and further infectious workup.     Patient had a prior anaphylactic reaction to penicillins prior.     ID consulted for ESBL Ecoli and E. Faecalis bacteremia.     Interval History: No fevers documented overnight.   Awaiting abdominal CT. Repeat blcx ngtd. Feeling well today - had her appetite come back.     Review of Systems   Constitutional:  Negative for chills and fever.   Gastrointestinal:  Negative for abdominal pain, diarrhea, nausea and vomiting.   All other systems reviewed and are negative.  Objective:     Vital Signs (Most Recent):  Temp: 98.6 °F (37 °C) (01/13/23 0506)  Pulse: 66 (01/13/23 0506)  Resp: 17 (01/13/23 0506)  BP: (!) 128/57 (01/13/23 0506)  SpO2: 99 % (01/13/23 0506)   Vital Signs (24h Range):  Temp:  [97.8 °F (36.6 °C)-98.6 °F (37 °C)] 98.6 °F (37 °C)  Pulse:  [64-69] 66  Resp:  [17-18] 17  SpO2:  [92 %-99 %] 99 %  BP: (103-138)/(56-63) 128/57     Weight: 60 kg (132 lb 4.4 oz)  Body mass index is 24.99 kg/m².    Estimated Creatinine Clearance: 6 mL/min (A) (based on SCr of 6.7 mg/dL (H)).    Physical Exam  Constitutional:       General: She is not in acute distress.     Appearance: She is not ill-appearing, toxic-appearing or diaphoretic.   HENT:      Head: Normocephalic and atraumatic.      Right Ear: External ear normal.      Left Ear: External ear normal.      Nose: Nose normal.   Eyes:      General: No scleral icterus.        Right eye: No discharge.         Left eye: No discharge.   Pulmonary:      Effort: Pulmonary effort is normal. No respiratory distress.      Breath sounds: No stridor. No wheezing  or rhonchi.   Abdominal:      General: There is no distension.      Palpations: Abdomen is soft.      Tenderness: There is no abdominal tenderness. There is no guarding.   Musculoskeletal:      Right lower leg: No edema.      Left lower leg: No edema.   Skin:     General: Skin is warm and dry.      Coloration: Skin is not jaundiced.      Findings: No bruising, erythema, lesion or rash.      Comments: Prior HD TDC site - dressed   Neurological:      Mental Status: She is alert and oriented to person, place, and time. Mental status is at baseline.   Psychiatric:         Mood and Affect: Mood normal.         Behavior: Behavior normal.       Significant Labs:   Microbiology Results (last 7 days)       Procedure Component Value Units Date/Time    Blood culture [[id number]] Collected: 01/11/23 0958    Order Status: Completed Specimen: Blood Updated: 01/12/23 1212     Blood Culture, Routine No growth to date      No Growth to date    Narrative:      Collection has been rescheduled by DNM1 at 01/11/2023 09:23 Reason:   Patient unavailable is a hard stick [place] [id number]  Collection has been rescheduled by DNM1 at 01/11/2023 09:23 Reason:   Patient unavailable is a hard stick [place] [id number]    Blood culture [[id number]] Collected: 01/11/23 0958    Order Status: Completed Specimen: Blood Updated: 01/12/23 1212     Blood Culture, Routine No growth to date      No Growth to date    Narrative:      Collection has been rescheduled by DNM1 at 01/11/2023 09:23 Reason:   Patient unavailable is a hard stick [place] [id number]  Collection has been rescheduled by DNM1 at 01/11/2023 09:23 Reason:   Patient unavailable is a hard stick [place] [id number]    Blood culture [[id number]]  (Abnormal) Collected: 01/09/23 2348    Order Status: Completed Specimen: Blood from Peripheral, Lower Arm, Right Updated: 01/12/23 1040     Blood Culture, Routine Gram stain sumi bottle: Gram positive cocci in chains resembling Strep      Results called to and read back by: Ej  Tera COLUNGA  01/10/2023  15:49      ENTEROCOCCUS FAECALIS  For susceptibility see order #[id number]      Narrative:      Right Peripheral    Blood culture [[id number]]  (Abnormal)  (Susceptibility) Collected: 01/09/23 3065    Order Status: Completed Specimen: Blood from Peripheral, Lower Arm, Left Updated: 01/12/23 1039     Blood Culture, Routine Gram stain sumi bottle: Gram positive cocci in chains resembling Strep      Positive results previously called 01/10/2023      ENTEROCOCCUS FAECALIS    Narrative:      Left Peripheral    Blood culture [[id number]]     Order Status: Canceled Specimen: Blood     Blood culture [[id number]]     Order Status: Canceled Specimen: Blood     Blood culture [[id number]]     Order Status: Canceled Specimen: Blood             Significant Imaging: I have reviewed all pertinent imaging results/findings within the past 24 hours.

## 2023-01-13 NOTE — ASSESSMENT & PLAN NOTE
Urine culture from 1/05 with ESBL E.coli   -Ertapenem renal dosing 500mg IV q24 ordered, completed 5 days (1/5-1/10). Missed dose on 1/9 due to transfer to hospital

## 2023-01-13 NOTE — PT/OT/SLP PROGRESS
Occupational Therapy   Treatment    Name: Kristin Goodman  MRN: [id number]  Admitting Diagnosis:  Bacteremia due to Enterococcus       Recommendations:     Discharge Recommendations: rehabilitation facility  Discharge Equipment Recommendations:  none  Barriers to discharge:  None    Assessment:     Kristin Goodman is a 77 y.o. female with a medical diagnosis of Bacteremia due to Enterococcus.  She presents with deficits in self-care tasks as well as endurance. Pt. Pleasant and cooperative. Patient would benefit from continued OT services to maximize level of safety and independence with self-care tasks.   . Performance deficits affecting function are weakness, impaired endurance, impaired self care skills, impaired functional mobility.     Rehab Prognosis:  Good; patient would benefit from acute skilled OT services to address these deficits and reach maximum level of function.       Plan:     Patient to be seen 3 x/week to address the above listed problems via self-care/home management, therapeutic activities, therapeutic exercises  Plan of Care Expires: 01/20/23  Plan of Care Reviewed with: patient    Subjective   Pt. Agreeable to session but did report just drinking the barium for test  Pain/Comfort:  Pain Rating 1: 0/10  Pain Rating Post-Intervention 1: 0/10    Objective:     Communicated with: nurse prior to session.  Patient found up in chair with  (seated in bedside chair) upon OT entry to room.    General Precautions: Standard, fall  contact  Orthopedic Precautions:N/A  Braces: N/A  Respiratory Status: Room air     Occupational Performance:     Bed Mobility:    Not tested as pt. Was up in chair      Functional Mobility/Transfers:  Patient completed Sit <> Stand Transfer with contact guard assistance  with  rolling walker   Functional Mobility: pt. Ambulated ~ 40 feet with RW and CGA    Activities of Daily Living:  Not performed      [facility] 6 Click ADL: 17    Treatment & Education:  Pt. Performed 1 set 15 reps of  BUE/BLE AROM there ex for all major planes of BUE motion      Patient left up in chair with call button in reach    GOALS:   Multidisciplinary Problems       Occupational Therapy Goals          Problem: Occupational Therapy    Goal Priority Disciplines Outcome Interventions   Occupational Therapy Goal     OT, PT/OT Ongoing, Progressing    Description: Goals to be met by: 1/17/2023 (1 week)     Patient will increase functional independence with ADLs by performing:    UE Dressing with [place].  LE Dressing with [place].  Grooming while standing at sink with [place].  Toileting from toilet with [place] for hygiene and clothing management.   Step transfer with [place]  Toilet transfer to toilet with [place].                         Time Tracking:     OT Date of Treatment: 01/13/23  OT Start Time: 1422  OT Stop Time: 1434  OT Total Time (min): 12 min    Billable Minutes:Therapeutic Exercise 12    OT/SARAHI: OT          1/13/2023

## 2023-01-13 NOTE — PLAN OF CARE
SW placed PMR consult for rehab consideration    Isela Lan, ROBERT, MSW, LMSW, RSW   Case Management  [facility]  Email: [email or web address]

## 2023-01-13 NOTE — SUBJECTIVE & OBJECTIVE
Interval History:     No acute events overnight. Patient making urine. Scr and BUN increased but no uremic symptoms. Blood cultures NGTD.     Review of patient's allergies indicates:   Allergen Reactions    Ampicillin     Peaches [peach (prunus persica)] Other (See Comments)     Pt unable to state type of reaction. Information obtained from daughter who states she was informed of allergy from patient.    Penicillins      Other reaction(s): Hives, anaphylaxis    Sulfa (sulfonamide antibiotics) Rash and Hives     Current Facility-Administered Medications   Medication Frequency    acetaminophen tablet 650 mg Q4H PRN    albuterol-ipratropium 2.5 mg-0.5 mg/3 mL nebulizer solution 3 mL Q4H PRN    aluminum-magnesium hydroxide 200-200 mg/5 mL suspension 30 mL QID PRN    atovaquone 750 mg/5 mL oral liquid 1,500 mg Daily    B-complex with vitamin C tablet 1 tablet Daily    bisacodyL suppository 10 mg Daily PRN    carvediloL tablet 25 mg BID    cloNIDine tablet 0.1 mg Q8H PRN    furosemide tablet 40 mg Daily    heparin (porcine) injection 1,000 Units PRN    iohexol (OMNIPAQUE) oral solution 15 mL PRN    levothyroxine tablet 25 mcg Before breakfast    magnesium oxide tablet 400 mg Daily    meclizine tablet 25 mg TID PRN    melatonin tablet 6 mg Nightly PRN    methocarbamoL tablet 500 mg TID PRN    mupirocin 2 % ointment BID    naloxone 0.4 mg/mL injection 0.02 mg PRN    ondansetron injection 4 mg Q8H PRN    pantoprazole EC tablet 40 mg BID AC    predniSONE tablet 10 mg with lunch    [START ON 1/15/2023] predniSONE tablet 5 mg Daily    prochlorperazine injection Soln 5 mg Q6H PRN    quetiapine split tablet 12.5 mg QHS    senna-docusate 8.6-50 mg per tablet 1 tablet BID PRN    simethicone chewable tablet 80 mg QID PRN    sodium chloride 0.9% flush 10 mL PRN    sodium chloride 0.9% flush 10 mL Q6H    And    sodium chloride 0.9% flush 10 mL PRN    sodium chloride 0.9% flush 10 mL Q6H PRN    sucralfate tablet 1 g TID PRN     vancomycin - pharmacy to dose pharmacy to manage frequency    white petrolatum 41 % ointment Daily     Facility-Administered Medications Ordered in Other Encounters   Medication Frequency    [MAR Hold - Suspended Admission] 0.9%  NaCl infusion (for blood administration) Q24H PRN    [MAR Hold - Suspended Admission] 0.9%  NaCl infusion PRN    [MAR Hold - Suspended Admission] 0.9%  NaCl infusion Once    [MAR Hold - Suspended Admission] acetaminophen tablet 650 mg Q4H PRN    [MAR Hold - Suspended Admission] albuterol-ipratropium 2.5 mg-0.5 mg/3 mL nebulizer solution 3 mL Q4H PRN    [MAR Hold - Suspended Admission] alteplase injection 2 mg PRN    [MAR Hold - Suspended Admission] alteplase injection 2 mg PRN    [MAR Hold - Suspended Admission] aluminum-magnesium hydroxide 200-200 mg/5 mL suspension 30 mL QID PRN    [MAR Hold - Suspended Admission] atovaquone 750 mg/5 mL oral liquid 1,500 mg Daily    [MAR Hold - Suspended Admission] B complex-vitamin C-folic acid 0.8 mg Tab 0.8 mg Daily    [MAR Hold - Suspended Admission] bisacodyL suppository 10 mg Daily PRN    [MAR Hold - Suspended Admission] carvediloL tablet 25 mg BID    celecoxib capsule 400 mg Once    [MAR Hold - Suspended Admission] cloNIDine tablet 0.1 mg Q8H PRN    [MAR Hold - Suspended Admission] dextrose 10% bolus 125 mL 125 mL PRN    [MAR Hold - Suspended Admission] dextrose 10% bolus 250 mL 250 mL PRN    [MAR Hold - Suspended Admission] epoetin hira-epbx injection 10,000 Units Every Mon, Wed, Fri    fentaNYL 50 mcg/mL injection  mcg PRN    [MAR Hold - Suspended Admission] furosemide tablet 40 mg Daily    [MAR Hold - Suspended Admission] heparin (porcine) injection 1,000 Units PRN    [MAR Hold - Suspended Admission] heparin, porcine (PF) 100 unit/mL injection flush 500 Units PRN    [MAR Hold - Suspended Admission] heparin, porcine (PF) 100 unit/mL injection flush 500 Units PRN    [MAR Hold - Suspended Admission] levothyroxine tablet 25 mcg Before  breakfast    LIDOcaine (PF) 10 mg/ml (1%) injection 10 mg Once PRN    LIDOcaine (PF) 10 mg/ml (1%) injection 10 mg Once    [MAR Hold - Suspended Admission] magnesium oxide tablet 400 mg Daily    [MAR Hold - Suspended Admission] meclizine tablet 25 mg TID PRN    [MAR Hold - Suspended Admission] melatonin tablet 6 mg Nightly PRN    [MAR Hold - Suspended Admission] methocarbamoL tablet 500 mg TID PRN    [MAR Hold - Suspended Admission] metoclopramide HCl injection 5 mg Q6H PRN    midazolam (VERSED) 1 mg/mL injection 0.5-4 mg PRN    [MAR Hold - Suspended Admission] naloxone 0.4 mg/mL injection 0.02 mg PRN    [MAR Hold - Suspended Admission] ondansetron injection 4 mg Q8H PRN    [MAR Hold - Suspended Admission] pantoprazole EC tablet 40 mg BID AC    [MAR Hold - Suspended Admission] predniSONE tablet 10 mg with lunch    [MAR Hold - Suspended Admission] predniSONE tablet 5 mg Daily    [MAR Hold - Suspended Admission] prochlorperazine injection Soln 5 mg Q6H PRN    [MAR Hold - Suspended Admission] quetiapine split tablet 12.5 mg QHS    ropivacaine 0.2% Nimbus PainPRO Pump infusion 500 ML Continuous    [MAR Hold - Suspended Admission] senna-docusate 8.6-50 mg per tablet 1 tablet BID PRN    [MAR Hold - Suspended Admission] simethicone chewable tablet 80 mg QID PRN    [MAR Hold - Suspended Admission] sodium chloride 0.9% bolus 250 mL 250 mL PRN    [MAR Hold - Suspended Admission] sodium chloride 0.9% bolus 250 mL 250 mL PRN    [MAR Hold - Suspended Admission] sodium chloride 0.9% flush 10 mL PRN    [MAR Hold - Suspended Admission] sodium chloride 0.9% flush 10 mL PRN    [MAR Hold - Suspended Admission] sodium chloride 0.9% flush 10 mL Q6H    And    [MAR Hold - Suspended Admission] sodium chloride 0.9% flush 10 mL PRN    [MAR Hold - Suspended Admission] sucralfate tablet 1 g TID PRN    [MAR Hold - Suspended Admission] vancomycin - pharmacy to dose pharmacy to manage frequency    [MAR Hold - Suspended Admission] white petrolatum  41 % ointment Daily       Objective:     Vital Signs (Most Recent):  Temp: 98.7 °F (37.1 °C) (01/13/23 0814)  Pulse: 68 (01/13/23 0814)  Resp: 18 (01/13/23 0814)  BP: 124/60 (01/13/23 0814)  SpO2: 97 % (01/13/23 0814) Vital Signs (24h Range):  Temp:  [97.8 °F (36.6 °C)-98.7 °F (37.1 °C)] 98.7 °F (37.1 °C)  Pulse:  [64-69] 68  Resp:  [17-18] 18  SpO2:  [92 %-99 %] 97 %  BP: (103-138)/(56-63) 124/60     Weight: 60 kg (132 lb 4.4 oz) (01/12/23 0400)  Body mass index is 24.99 kg/m².  Body surface area is 1.61 meters squared.    I/O last 3 completed shifts:  In: 480 [P.O.:480]  Out: -     Physical Exam  Constitutional:       General: She is not in acute distress.     Appearance: She is not ill-appearing, toxic-appearing or diaphoretic.   HENT:      Head: Normocephalic and atraumatic.      Right Ear: External ear normal.      Left Ear: External ear normal.      Nose: Nose normal.   Eyes:      General: No scleral icterus.        Right eye: No discharge.         Left eye: No discharge.   Pulmonary:      Effort: Pulmonary effort is normal. No respiratory distress.      Breath sounds: No stridor. No wheezing or rhonchi.   Abdominal:      General: There is no distension.      Palpations: Abdomen is soft.      Tenderness: There is no abdominal tenderness. There is no guarding.   Musculoskeletal:      Right lower leg: No edema.      Left lower leg: No edema.   Skin:     General: Skin is warm and dry.      Coloration: Skin is not jaundiced.      Findings: No bruising, erythema, lesion or rash.      Comments: Prior HD TDC site - dressed   Neurological:      Mental Status: She is alert and oriented to person, place, and time. Mental status is at baseline.   Psychiatric:         Mood and Affect: Mood normal.         Behavior: Behavior normal.       Significant Labs:  All labs within the past 24 hours have been reviewed.     Significant Imaging:  Labs: Reviewed

## 2023-01-13 NOTE — ASSESSMENT & PLAN NOTE
Positive blood cxs 1/5, 1/7, 1/9. Received 1 gm IV vancomycin at LTAC on 1/10.   HD tunelled cath removed 1/10.  Also has midline from LTAC - removed. TTE w/o vegetations  -infectious disease consultation appreciated  -would continue surveillance cultures daily until 2 samples negative x 48hrs from line removal time.   -pharmd consult for vancomycin  -will get ct abd/pelvis w/o IV contrast per ID recs

## 2023-01-13 NOTE — ASSESSMENT & PLAN NOTE
-due to Microscopic Polyangiitis  -HD on hold due to line holiday, Nephrology is following and anticipates needing HD over the weekend.   -renal function panel daily

## 2023-01-13 NOTE — PLAN OF CARE
As per attending pt will need follow up by MD 3x week during Rehab stay for the following reasons:    Lab monitoring 3x week   Dialysis 3x week  Vancomycin therapy for bacteriemia     Isela Lan CD, MSW, LMSW, RSW   Case Management  [facility]  Email: [email or web address]
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
## 2023-01-13 NOTE — PLAN OF CARE
01/13/23 1236   Post-Acute Status   Post-Acute Authorization Placement   Post-Acute Placement Status Referrals Sent   Discharge Delays None known at this time   Discharge Plan   Discharge Plan A Rehab     As per daughter Roro pt was with [facility] prior to [facility] admission.  Per daughter pt was to d/c to [facility] after LTAC stay.      Sw sent referral via careport to [facility]      Isela Lan CD, MSW, LMSW, RSW   Case Management  [facility]  Email: [email or web address]

## 2023-01-13 NOTE — PROGRESS NOTES
Pharmacokinetic Assessment Follow Up: IV Vancomycin    Vancomycin serum concentration assessment(s)/plan:     The random level resulted as 16.7 mcg/mL (~60 hrs post dose) and can be used to guide therapy at this time. The measurement is within the desired definitive target range of 15 to 20 mcg/mL(for E.Faecalis bacteremia).  ESRD on HD (twice weekly MF).  HD line removed 1/10 with 48 hour line holiday > completed  Waiting for HD jose angel replacement, ID approved re-establish line  Will not dose Vancomycin today and collect random level with AM labs on 1/14/23.  Will redose if level falls below goal of 15-20 mcg/mL and based on iHD.   Follow up Nephro recs for future HD plan.    Drug levels (last 3 results):  Recent Labs   Lab Result Units 01/11/23  0508 01/12/23  0552 01/13/23  0457   Vancomycin, Random ug/mL 18.2 17.9 16.7         Pharmacy will continue to follow and monitor vancomycin.    Please contact pharmacy at extension [phone]  for questions regarding this assessment.    Thank you for the consult,   Paul Vee       Patient brief summary:  Kristin Goodman is a 77 y.o. female initiated on antimicrobial therapy with IV Vancomycin for treatment of bacteremia    The patient's current regimen is pulse dosing    Drug Allergies:   Review of patient's allergies indicates:   Allergen Reactions    Ampicillin     Peaches [peach (prunus persica)] Other (See Comments)     Pt unable to state type of reaction. Information obtained from daughter who states she was informed of allergy from patient.    Penicillins      Other reaction(s): Hives, anaphylaxis    Sulfa (sulfonamide antibiotics) Rash and Hives       Actual Body Weight:   59.6 kg    Renal Function:   Estimated Creatinine Clearance: 5.8 mL/min (A) (based on SCr of 7 mg/dL (H)).,     Dialysis Method (if applicable):  intermittent HD    CBC (last 72 hours):  No results for input(s): WHITE BLOOD CELL COUNT, HEMOGLOBIN, HEMATOCRIT, PLATELETS, GRAN%, LYMPH%, MONO%,  EOSINOPHIL%, BASOPHIL%, DIFFERENTIAL METHOD in the last 72 hours.      Metabolic Panel (last 72 hours):  Recent Labs   Lab Result Units 01/11/23  0508 01/12/23  0552 01/13/23  0824   Sodium mmol/L 138 138 140   Potassium mmol/L 4.4 4.2 4.3   Chloride mmol/L 103 102 104   CO2 mmol/L 20* 22* 18*   Glucose mg/dL 63* 84 84   BUN mg/dL 69* 72* 76*   Creatinine mg/dL 6.4* 6.7* 7.0*   Albumin g/dL 2.4* 2.5* 2.6*   Total Bilirubin mg/dL 0.3 0.3  --    Alkaline Phosphatase U/L 66 66  --    AST U/L 23 18  --    ALT U/L 6* 8*  --    Magnesium mg/dL 2.2 2.3  --    Phosphorus mg/dL 5.6* 5.4* 5.5*         Vancomycin Administrations:  vancomycin given in the last 96 hours                     vancomycin (VANCOCIN) 500 mg in dextrose 5 % in water (D5W) 5 % 100 mL IVPB (MB+) (mg) 500 mg New Bag 01/10/23 1629    vancomycin 1 g in 0.9% sodium chloride 250 mL IVPB (ready to mix system) (mg) 1,000 mg New Bag 01/09/23 1302                    Microbiologic Results:  Microbiology Results (last 7 days)       Procedure Component Value Units Date/Time    Blood culture [[id number]] Collected: 01/11/23 0958    Order Status: Completed Specimen: Blood Updated: 01/12/23 1212     Blood Culture, Routine No growth to date      No Growth to date    Narrative:      Collection has been rescheduled by DNM1 at 01/11/2023 09:23 Reason:   Patient unavailable is a hard stick Suzy [id number]  Collection has been rescheduled by DNM1 at 01/11/2023 09:23 Reason:   Patient unavailable is a hard stick Suzy [id number]    Blood culture [[id number]] Collected: 01/11/23 0958    Order Status: Completed Specimen: Blood Updated: 01/12/23 1212     Blood Culture, Routine No growth to date      No Growth to date    Narrative:      Collection has been rescheduled by DNM1 at 01/11/2023 09:23 Reason:   Patient unavailable is a hard stick Suzy [id number]  Collection has been rescheduled by DNM1 at 01/11/2023 09:23 Reason:   Patient unavailable is a hard stick Suzy [id number]    Blood culture  [[id number]]  (Abnormal) Collected: 01/09/23 2348    Order Status: Completed Specimen: Blood from Peripheral, Lower Arm, Right Updated: 01/12/23 1040     Blood Culture, Routine Gram stain sumi bottle: Gram positive cocci in chains resembling Strep      Results called to and read back by: Ej Haley RN  01/10/2023  15:49      ENTEROCOCCUS FAECALIS  For susceptibility see order #[id number]      Narrative:      Right Peripheral    Blood culture [[id number]]  (Abnormal)  (Susceptibility) Collected: 01/09/23 2348    Order Status: Completed Specimen: Blood from Peripheral, Lower Arm, Left Updated: 01/12/23 1039     Blood Culture, Routine Gram stain sumi bottle: Gram positive cocci in chains resembling Strep      Positive results previously called 01/10/2023      ENTEROCOCCUS FAECALIS    Narrative:      Left Peripheral    Blood culture [[id number]]     Order Status: Canceled Specimen: Blood     Blood culture [[id number]]     Order Status: Canceled Specimen: Blood     Blood culture [[id number]]     Order Status: Canceled Specimen: Blood

## 2023-01-13 NOTE — PROGRESS NOTES
[facility] ([place] [id number])  Nephrology  Progress Note    Patient Name: Kristin Goodman  MRN: [id number]  Admission Date: 1/9/2023  Hospital Length of Stay: 4 days  Attending Provider: Kitty Dixon MD   Primary Care Physician: Lori Hernandez MD  Principal Problem:Bacteremia due to Enterococcus    Subjective:     HPI: Ms Goodman is a 77-year-old woman with hypertension, hypothyroidism, HFpEF (most recent TTE with EF 65% with G1DD), pulmonary hypertension, current ESBL E. coli UTI, osteoarthritis, chronic back pain, DONAVAN and microscopic polyangiitis complicated base on biopsy from 10/26 by renal failure, GIB and respiratory failure with history of diffuse alveolar hemorrhage s/p IV Solumedrol, PLEX x5 sessions, Rituximab x4 doses and cyclophosphamide however now  just on steroid taper (cyclophosphamide appears to be hold secondary to anemia) now iHD dependent twice weekly via tunneled HD catheter for metabolic clearance (follows with Dr. Mojica with [facility]) who presented from [facility] for TDC removal in the setting of positive blood cultures growing Enterococcus faecalis and Bacillus species from cultures obtained on 1/5 & 1/7. In addition patient with reported syncopal event and sluggish flows on HD on 1/9 (incomplete session, daughter attributes to iron infusion) with last full session of iHD being on 1/6. She reports still making good urine without any urinary complaints today. Nephrology has been consulted for inpatient HD needs.      Interval History:     No acute events overnight. Patient making urine. Scr and BUN increased but no uremic symptoms. Blood cultures NGTD.     Review of patient's allergies indicates:   Allergen Reactions    Ampicillin     Peaches [peach (prunus persica)] Other (See Comments)     Pt unable to state type of reaction. Information obtained from daughter who states she was informed of allergy from patient.    Penicillins      Other reaction(s):  Hives, anaphylaxis    Sulfa (sulfonamide antibiotics) Rash and Hives     Current Facility-Administered Medications   Medication Frequency    acetaminophen tablet 650 mg Q4H PRN    albuterol-ipratropium 2.5 mg-0.5 mg/3 mL nebulizer solution 3 mL Q4H PRN    aluminum-magnesium hydroxide 200-200 mg/5 mL suspension 30 mL QID PRN    atovaquone 750 mg/5 mL oral liquid 1,500 mg Daily    B-complex with vitamin C tablet 1 tablet Daily    bisacodyL suppository 10 mg Daily PRN    carvediloL tablet 25 mg BID    cloNIDine tablet 0.1 mg Q8H PRN    furosemide tablet 40 mg Daily    heparin (porcine) injection 1,000 Units PRN    iohexol (OMNIPAQUE) oral solution 15 mL PRN    levothyroxine tablet 25 mcg Before breakfast    magnesium oxide tablet 400 mg Daily    meclizine tablet 25 mg TID PRN    melatonin tablet 6 mg Nightly PRN    methocarbamoL tablet 500 mg TID PRN    mupirocin 2 % ointment BID    naloxone 0.4 mg/mL injection 0.02 mg PRN    ondansetron injection 4 mg Q8H PRN    pantoprazole EC tablet 40 mg BID AC    predniSONE tablet 10 mg with lunch    [START ON 1/15/2023] predniSONE tablet 5 mg Daily    prochlorperazine injection Soln 5 mg Q6H PRN    quetiapine split tablet 12.5 mg QHS    senna-docusate 8.6-50 mg per tablet 1 tablet BID PRN    simethicone chewable tablet 80 mg QID PRN    sodium chloride 0.9% flush 10 mL PRN    sodium chloride 0.9% flush 10 mL Q6H    And    sodium chloride 0.9% flush 10 mL PRN    sodium chloride 0.9% flush 10 mL Q6H PRN    sucralfate tablet 1 g TID PRN    vancomycin - pharmacy to dose pharmacy to manage frequency    white petrolatum 41 % ointment Daily     Facility-Administered Medications Ordered in Other Encounters   Medication Frequency    [MAR Hold - Suspended Admission] 0.9%  NaCl infusion (for blood administration) Q24H PRN    [MAR Hold - Suspended Admission] 0.9%  NaCl infusion PRN    [MAR Hold - Suspended Admission] 0.9%  NaCl infusion Once    [MAR Hold  - Suspended Admission] acetaminophen tablet 650 mg Q4H PRN    [MAR Hold - Suspended Admission] albuterol-ipratropium 2.5 mg-0.5 mg/3 mL nebulizer solution 3 mL Q4H PRN    [MAR Hold - Suspended Admission] alteplase injection 2 mg PRN    [MAR Hold - Suspended Admission] alteplase injection 2 mg PRN    [MAR Hold - Suspended Admission] aluminum-magnesium hydroxide 200-200 mg/5 mL suspension 30 mL QID PRN    [MAR Hold - Suspended Admission] atovaquone 750 mg/5 mL oral liquid 1,500 mg Daily    [MAR Hold - Suspended Admission] B complex-vitamin C-folic acid 0.8 mg Tab 0.8 mg Daily    [MAR Hold - Suspended Admission] bisacodyL suppository 10 mg Daily PRN    [MAR Hold - Suspended Admission] carvediloL tablet 25 mg BID    celecoxib capsule 400 mg Once    [MAR Hold - Suspended Admission] cloNIDine tablet 0.1 mg Q8H PRN    [MAR Hold - Suspended Admission] dextrose 10% bolus 125 mL 125 mL PRN    [MAR Hold - Suspended Admission] dextrose 10% bolus 250 mL 250 mL PRN    [MAR Hold - Suspended Admission] epoetin hira-epbx injection 10,000 Units Every Mon, Wed, Fri    fentaNYL 50 mcg/mL injection  mcg PRN    [MAR Hold - Suspended Admission] furosemide tablet 40 mg Daily    [MAR Hold - Suspended Admission] heparin (porcine) injection 1,000 Units PRN    [MAR Hold - Suspended Admission] heparin, porcine (PF) 100 unit/mL injection flush 500 Units PRN    [MAR Hold - Suspended Admission] heparin, porcine (PF) 100 unit/mL injection flush 500 Units PRN    [MAR Hold - Suspended Admission] levothyroxine tablet 25 mcg Before breakfast    LIDOcaine (PF) 10 mg/ml (1%) injection 10 mg Once PRN    LIDOcaine (PF) 10 mg/ml (1%) injection 10 mg Once    [MAR Hold - Suspended Admission] magnesium oxide tablet 400 mg Daily    [MAR Hold - Suspended Admission] meclizine tablet 25 mg TID PRN    [MAR Hold - Suspended Admission] melatonin tablet 6 mg Nightly PRN    [MAR Hold - Suspended Admission] methocarbamoL tablet 500 mg TID  PRN    [MAR Hold - Suspended Admission] metoclopramide HCl injection 5 mg Q6H PRN    midazolam (VERSED) 1 mg/mL injection 0.5-4 mg PRN    [MAR Hold - Suspended Admission] naloxone 0.4 mg/mL injection 0.02 mg PRN    [MAR Hold - Suspended Admission] ondansetron injection 4 mg Q8H PRN    [MAR Hold - Suspended Admission] pantoprazole EC tablet 40 mg BID AC    [MAR Hold - Suspended Admission] predniSONE tablet 10 mg with lunch    [MAR Hold - Suspended Admission] predniSONE tablet 5 mg Daily    [MAR Hold - Suspended Admission] prochlorperazine injection Soln 5 mg Q6H PRN    [MAR Hold - Suspended Admission] quetiapine split tablet 12.5 mg QHS    ropivacaine 0.2% Nimbus PainPRO Pump infusion 500 ML Continuous    [MAR Hold - Suspended Admission] senna-docusate 8.6-50 mg per tablet 1 tablet BID PRN    [MAR Hold - Suspended Admission] simethicone chewable tablet 80 mg QID PRN    [MAR Hold - Suspended Admission] sodium chloride 0.9% bolus 250 mL 250 mL PRN    [MAR Hold - Suspended Admission] sodium chloride 0.9% bolus 250 mL 250 mL PRN    [MAR Hold - Suspended Admission] sodium chloride 0.9% flush 10 mL PRN    [MAR Hold - Suspended Admission] sodium chloride 0.9% flush 10 mL PRN    [MAR Hold - Suspended Admission] sodium chloride 0.9% flush 10 mL Q6H    And    [MAR Hold - Suspended Admission] sodium chloride 0.9% flush 10 mL PRN    [MAR Hold - Suspended Admission] sucralfate tablet 1 g TID PRN    [MAR Hold - Suspended Admission] vancomycin - pharmacy to dose pharmacy to manage frequency    [MAR Hold - Suspended Admission] white petrolatum 41 % ointment Daily       Objective:     Vital Signs (Most Recent):  Temp: 98.7 °F (37.1 °C) (01/13/23 0814)  Pulse: 68 (01/13/23 0814)  Resp: 18 (01/13/23 0814)  BP: 124/60 (01/13/23 0814)  SpO2: 97 % (01/13/23 0814) Vital Signs (24h Range):  Temp:  [97.8 °F (36.6 °C)-98.7 °F (37.1 °C)] 98.7 °F (37.1 °C)  Pulse:  [64-69] 68  Resp:  [17-18] 18  SpO2:  [92 %-99 %] 97 %  BP:  (103-138)/(56-63) 124/60     Weight: 60 kg (132 lb 4.4 oz) (01/12/23 0400)  Body mass index is 24.99 kg/m².  Body surface area is 1.61 meters squared.    I/O last 3 completed shifts:  In: 480 [P.O.:480]  Out: -     Physical Exam  Constitutional:       General: She is not in acute distress.     Appearance: She is not ill-appearing, toxic-appearing or diaphoretic.   HENT:      Head: Normocephalic and atraumatic.      Right Ear: External ear normal.      Left Ear: External ear normal.      Nose: Nose normal.   Eyes:      General: No scleral icterus.        Right eye: No discharge.         Left eye: No discharge.   Pulmonary:      Effort: Pulmonary effort is normal. No respiratory distress.      Breath sounds: No stridor. No wheezing or rhonchi.   Abdominal:      General: There is no distension.      Palpations: Abdomen is soft.      Tenderness: There is no abdominal tenderness. There is no guarding.   Musculoskeletal:      Right lower leg: No edema.      Left lower leg: No edema.   Skin:     General: Skin is warm and dry.      Coloration: Skin is not jaundiced.      Findings: No bruising, erythema, lesion or rash.      Comments: Prior HD TDC site - dressed   Neurological:      Mental Status: She is alert and oriented to person, place, and time. Mental status is at baseline.   Psychiatric:         Mood and Affect: Mood normal.         Behavior: Behavior normal.       Significant Labs:  All labs within the past 24 hours have been reviewed.     Significant Imaging:  Labs: Reviewed    Assessment/Plan:     * Bacteremia due to Enterococcus  - management per ID and primary  - will need to have dialysis perm line replaced after BC negative x 48hrs per ID recs.     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 Now iHD for which she receives HD on one but usually twice weekly for metabolic clearance via tunneled HD catheter roughly x2 a week with last HD Friday (01/06/2023).    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  3)  Focal acute pyelonephritis.  4)  Mild-to-moderate arterionephrosclerosis    Plan/Recommendations:  - no indication for urgent RRT at this time.   - okay for line holiday while awaiting for blood cultures to clear x48 hours  - Per ID recs okay to place line once blood cultures negative x 48hrs. Please place perm cath once this happens. Patient still with poor clearance.   - can continue Lasix 40 mg daily for now   - currently on prednisone taper 10 mg daily x5 days followed by 5 mg daily with atovaquone 1.5 grams faily for PJP prophylaxis   - renal diet if not NPO  - strict I/Os and daily weights  - daily RFPs and magnesium level  - renally dose all medications to eGFR  - avoid nephrotoxic  agents when feasible (i.e. intra-arterial contrast, NSAIDs, supra-therapeutic vancomycin troughs, etc.)        Thank you for your consult. I will follow-up with patient. Please contact us if you have any additional questions.   Case discussed with attending. Attestation to follow.       Chris Leyva DO  Nephrology  [facility] ([facility]

## 2023-01-13 NOTE — CONSULTS
PharmD Consult received for:     1.) Admit medication history/reconciliation: Complete. See note entered by Eve Guzman 1/10/23     2.) Renally adjust all medications: ESRD on HD. No adjustments needed. Will continue to monitor daily through departmental protocols      Thank you for the consult, will sign off  Romario Gatica.D., BCPS  [id number]       **Note: Consults are reviewed Monday-Friday 7:00am-3:30pm. The above recommendations are only suggested. The recommendations should be considered in conjunction with all patient factors.**

## 2023-01-13 NOTE — ASSESSMENT & PLAN NOTE
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 Now iHD for which she receives HD on one but usually twice weekly for metabolic clearance via tunneled HD catheter roughly x2 a week with last HD Friday (01/06/2023).    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  2)  Necrotizing cresentic glomerulonephritis/[id number] necrotizing cresecentic glomerulonephritis.  3)  Focal acute pyelonephritis.  4)  Mild-to-moderate arterionephrosclerosis    Plan/Recommendations:  - no indication for urgent RRT at this time.   - okay for line holiday while awaiting for blood cultures to clear x48 hours  - Per ID recs okay to place line once blood cultures negative x 48hrs. Please place perm cath once this happens. Patient still with poor clearance.   - can continue Lasix 40 mg daily for now   - currently on prednisone taper 10 mg daily x5 days followed by 5 mg daily with atovaquone 1.5 grams faily for PJP prophylaxis   - renal diet if not NPO  - strict I/Os and daily weights  - daily RFPs and magnesium level  - renally dose all medications to eGFR  - avoid nephrotoxic agents when feasible (i.e. intra-arterial contrast, NSAIDs, supra-therapeutic vancomycin troughs, etc.)

## 2023-01-13 NOTE — ASSESSMENT & PLAN NOTE
- management per ID and primary  - will need to have dialysis perm line replaced after BC negative x 48hrs per ID recs.

## 2023-01-13 NOTE — SUBJECTIVE & OBJECTIVE
Interval History: No fevers documented overnight.   Awaiting abdominal CT. Repeat blcx ngtd. Feeling well today - had her appetite come back.     Review of Systems   Constitutional:  Negative for chills and fever.   Gastrointestinal:  Negative for abdominal pain, diarrhea, nausea and vomiting.   All other systems reviewed and are negative.  Objective:     Vital Signs (Most Recent):  Temp: 98.6 °F (37 °C) (01/13/23 0506)  Pulse: 66 (01/13/23 0506)  Resp: 17 (01/13/23 0506)  BP: (!) 128/57 (01/13/23 0506)  SpO2: 99 % (01/13/23 0506)   Vital Signs (24h Range):  Temp:  [97.8 °F (36.6 °C)-98.6 °F (37 °C)] 98.6 °F (37 °C)  Pulse:  [64-69] 66  Resp:  [17-18] 17  SpO2:  [92 %-99 %] 99 %  BP: (103-138)/(56-63) 128/57     Weight: 60 kg (132 lb 4.4 oz)  Body mass index is 24.99 kg/m².    Estimated Creatinine Clearance: 6 mL/min (A) (based on SCr of 6.7 mg/dL (H)).    Physical Exam  Constitutional:       General: She is not in acute distress.     Appearance: She is not ill-appearing, toxic-appearing or diaphoretic.   HENT:      Head: Normocephalic and atraumatic.      Right Ear: External ear normal.      Left Ear: External ear normal.      Nose: Nose normal.   Eyes:      General: No scleral icterus.        Right eye: No discharge.         Left eye: No discharge.   Pulmonary:      Effort: Pulmonary effort is normal. No respiratory distress.      Breath sounds: No stridor. No wheezing or rhonchi.   Abdominal:      General: There is no distension.      Palpations: Abdomen is soft.      Tenderness: There is no abdominal tenderness. There is no guarding.   Musculoskeletal:      Right lower leg: No edema.      Left lower leg: No edema.   Skin:     General: Skin is warm and dry.      Coloration: Skin is not jaundiced.      Findings: No bruising, erythema, lesion or rash.      Comments: Prior HD TDC site - dressed   Neurological:      Mental Status: She is alert and oriented to person, place, and time. Mental status is at baseline.    Psychiatric:         Mood and Affect: Mood normal.         Behavior: Behavior normal.       Significant Labs:   Microbiology Results (last 7 days)       Procedure Component Value Units Date/Time    Blood culture [[id number]] Collected: 01/11/23 0958    Order Status: Completed Specimen: Blood Updated: 01/12/23 1212     Blood Culture, Routine No growth to date      No Growth to date    Narrative:      Collection has been rescheduled by [email or web address] at 01/11/2023 09:23 Reason:   Patient unavailable is a hard stick Suzy [id number]  Collection has been rescheduled by [email or web address] at 01/11/2023 09:23 Reason:   Patient unavailable is a hard stick Suzy [id number]    Blood culture [[id number]] Collected: 01/11/23 0958    Order Status: Completed Specimen: Blood Updated: 01/12/23 1212     Blood Culture, Routine No growth to date      No Growth to date    Narrative:      Collection has been rescheduled by [email or web address] at 01/11/2023 09:23 Reason:   Patient unavailable is a hard stick Suzy [id number]  Collection has been rescheduled by [email or web address] at 01/11/2023 09:23 Reason:   Patient unavailable is a hard stick Suzy [id number]    Blood culture [[id number]]  (Abnormal) Collected: 01/09/23 2348    Order Status: Completed Specimen: Blood from Peripheral, Lower Arm, Right Updated: 01/12/23 1040     Blood Culture, Routine Gram stain sumi bottle: Gram positive cocci in chains resembling Strep      Results called to and read back by: Ej Haley RN  01/10/2023  15:49      ENTEROCOCCUS FAECALIS  For susceptibility see order #[id number]      Narrative:      Right Peripheral    Blood culture [[id number]]  (Abnormal)  (Susceptibility) Collected: 01/09/23 2348    Order Status: Completed Specimen: Blood from Peripheral, Lower Arm, Left Updated: 01/12/23 1039     Blood Culture, Routine Gram stain sumi bottle: Gram positive cocci in chains resembling Strep      Positive results previously called 01/10/2023      ENTEROCOCCUS FAECALIS    Narrative:      Left Peripheral    Blood culture  [[id number]]     Order Status: Canceled Specimen: Blood     Blood culture [[id number]]     Order Status: Canceled Specimen: Blood     Blood culture [[id number]]     Order Status: Canceled Specimen: Blood             Significant Imaging: I have reviewed all pertinent imaging results/findings within the past 24 hours.

## 2023-01-13 NOTE — PLAN OF CARE
[facility] ([facility])  Discharge Reassessment    Primary Care Provider: Lori Hernandez MD    Expected Discharge Date: 1/16/2023    Reassessment (most recent)       Discharge Reassessment - 01/13/23 1213          Discharge Reassessment    Assessment Type Discharge Planning Reassessment (P)      Did the patient's condition or plan change since previous assessment? No (P)      Discharge Plan discussed with: Patient;Adult children (P)      Discharge Barriers Identified None (P)         Post-Acute Status    Discharge Delays None known at this time (P)                  Spoke with patient in room; she states her daughter Roro takes care of her health care planning.  Called Roro at [phone]; she states that pt was at [facility] ([facility] prior to admit.  Rehab has been recommended for this patient.  CM will follow.    2:34 PM CM faxed referral for Rehab to [facility].    YIMI Toussaint, BS, RN, CCM

## 2023-01-13 NOTE — PROGRESS NOTES
[facility] ([address] Medicine  Progress Note    Patient Name: Kristin Goodman  MRN: [id number]  Patient Class: IP- Inpatient   Admission Date: 1/9/2023  Length of Stay: 3 days  Attending Physician: Kitty Dixon MD  Primary Care Provider: Lori Hernandez MD        Subjective:     Principal Problem:Bacteremia due to Enterococcus        HPI:  77-year-old  woman with HTN, hypothyroidism, HFpEF, and osteoarthritis and new acute renal failure due to new diagnosis of Microscopic Polyangiitis s/p renal biopsy and requiring hemodialysis is transferred from [facility] for concerns of new bacteremia.     She was hospitalized from 10/17/22-12/13/22 then transferred to [facility].  Microscopic polyangiitis with pulmonary and renal involvement had suffered respiratory failure with diffuse alveolar hemorrhage, gi bleeding, and renal replacement therapy. S/p Rheumatology consultation and pulse IV steroids + plasmapheresis with Transfusion medicine started on Rituximab and Cyclophosphamide.  Continues on Steroid taper for immunosuppression.     More recently earlier this week noted to have a urine culture grow ESBL E.coli Cystitis, started on meropenem, then she had blood cultures from 1/5 and 1/7 that have grown Enterococcus (amp sensitive) vancomycin started today, patient had sluggish HD catheter with dialysis.   Also ED report that patient may have had syncope during HD today.     She is transferred for continued infectious workup and management as well as removal of tunneled HD line for line holiday.       Overview/Hospital Course:  Seen by ID and nephrology. Plan for line holiday and IR consulted.      Interval History: no major events, bacteremia evaluation underway. Scheduled for ct cap and echo today. Hd on hold due to line holiday. Voices no complaints/concerns.    Review of Systems   Constitutional:  Negative for chills and fatigue.   HENT:  Negative for congestion.     Respiratory:  Negative for shortness of breath.    Cardiovascular:  Negative for chest pain.   Gastrointestinal:  Negative for abdominal pain, diarrhea, nausea and vomiting.   All other systems reviewed and are negative.  Objective:     Vital Signs (Most Recent):  Temp: 98.5 °F (36.9 °C) (01/12/23 2039)  Pulse: 69 (01/12/23 2039)  Resp: 17 (01/12/23 2039)  BP: 137/62 (01/12/23 2039)  SpO2: 96 % (01/12/23 2039) Vital Signs (24h Range):  Temp:  [97.7 °F (36.5 °C)-98.5 °F (36.9 °C)] 98.5 °F (36.9 °C)  Pulse:  [64-72] 69  Resp:  [17-18] 17  SpO2:  [92 %-97 %] 96 %  BP: (103-137)/(56-64) 137/62     Weight: 60 kg (132 lb 4.4 oz)  Body mass index is 24.99 kg/m².    Intake/Output Summary (Last 24 hours) at 1/12/2023 2049  Last data filed at 1/12/2023 1741  Gross per 24 hour   Intake 480 ml   Output --   Net 480 ml      Physical Exam  Vitals reviewed.   Constitutional:       Appearance: She is not toxic-appearing or diaphoretic.   HENT:      Head: Normocephalic and atraumatic.   Eyes:      General: No scleral icterus.     Extraocular Movements: Extraocular movements intact.   Cardiovascular:      Rate and Rhythm: Normal rate and regular rhythm.      Pulses: Normal pulses.      Heart sounds: No murmur heard.  Pulmonary:      Effort: Pulmonary effort is normal. No respiratory distress.   Abdominal:      General: Abdomen is flat.      Palpations: Abdomen is soft.      Tenderness: There is no abdominal tenderness.   Musculoskeletal:      Right lower leg: No edema.      Left lower leg: No edema.   Skin:     General: Skin is warm.   Neurological:      General: No focal deficit present.      Mental Status: She is alert. Mental status is at baseline.   Psychiatric:         Behavior: Behavior normal. Behavior is cooperative.         Thought Content: Thought content normal.       Significant Labs: All pertinent labs within the past 24 hours have been reviewed.  Recent Lab Results         01/12/23  0552        Albumin 2.5        Alkaline Phosphatase 66       ALT 8       Anion Gap 14       aPTT 28.8  Comment: aPTT therapeutic range = 39-69 seconds       AST 18       BILIRUBIN TOTAL 0.3  Comment: For infants and newborns, interpretation of results should be based  on gestational age, weight and in agreement with clinical  observations.    Premature Infant recommended reference ranges:  Up to 24 hours.............<8.0 mg/dL  Up to 48 hours............<12.0 mg/dL  3-5 days..................<15.0 mg/dL  6-29 days.................<15.0 mg/dL         BUN 72       Calcium 8.5       Chloride 102       CO2 22       Creatinine 6.7       eGFR 6.0       Glucose 84       INR 1.1  Comment: Coumadin Therapy:  2.0 - 3.0 for INR for all indicators except mechanical heart valves  and antiphospholipid syndromes which should use 2.5 - 3.5.         Magnesium 2.3       Phosphorus 5.4       Potassium 4.2       PROTEIN TOTAL 5.8       Protime 11.1       Sodium 138       Vancomycin, Random 17.9               Significant Imaging: I have reviewed all pertinent imaging results/findings within the past 24 hours.      Assessment/Plan:      * Bacteremia due to Enterococcus  Positive blood cxs 1/5, 1/7, 1/9. Received 1 gm IV vancomycin at LTAC on 1/10.   HD tunelled cath removed 1/10.  Also has midline from LTAC - removed. TTE w/o vegetations  -infectious disease consultation appreciated  -would continue surveillance cultures daily until 2 samples negative x 48hrs from line removal time.   -pharmd consult for vancomycin  -will get ct abd/pelvis w/o IV contrast per ID recs        Urinary tract infection due to extended-spectrum beta lactamase (ESBL) producing Escherichia coli  Urine culture from 1/05 with ESBL E.coli   -Ertapenem renal dosing 500mg IV q24 ordered, completed 5 days (1/5-1/10). Missed dose on 1/9 due to transfer to hospital    Anemia due to chronic kidney disease  Has required transfusions during recent inpatient/LTAC stays   -monitor CBC  -was receiving EPO  infusion at nephrology direction.       Primary pauci-immune necrotizing and crescentic glomerulonephritis  Patient with acute kidney injury likely due to microscopic polyangiits with granulomatosis (MPA) WILFREDO is currently stable. Labs reviewed- Renal function/electrolytes with Estimated Creatinine Clearance: 6.3 mL/min (A) (based on SCr of 6.4 mg/dL (H)). according to latest data. Monitor urine output and serial BMP and adjust therapy as needed. Avoid nephrotoxins and renally dose meds for GFR listed above.  Had complex course with DAH, requiring pulse dose steroids, plasmapheresis and then rituximab and cyclophosphamide    -continue prednisone taper  -continue atovaquone for PJP ppx  -will need outpatient rheum follow up    Gastric reflux  Continue BID ppi      Dysphagia  Continue renal diet       Acute renal failure on dialysis  -due to Microscopic Polyangiitis  -HD on hold due to line holiday, Nephrology is following and anticipates needing HD over the weekend.   -renal function panel daily      Microscopic polyangiitis  -continue steroid taper   -patient is on OI prophylaxis with atovaquone 1500mg po daily  -continued on protonix 40mg po bid while on glucocorticoids.       Chronic diastolic heart failure  Has preserved EF   -HD was primary volume maintenance   -continue lasix 40mg po daily - discuss with nephrology if higher or more frequent doses are required during her LIne holiday       Syncope  Report of possible syncope with HD,  Dizzy spells noted per LTAC notes - pt s/p MRI brain on 12/8 w/o acute findings   -neurology prior eval has recommended PT/OT for vestibular therapy  -Future outpatient cardiology visit for possible tilt-table eval.   -refer to neurology at discharge for BPPV f/u       Primary hypertension  Continue carvedilol  -home lisinopril is on hold due to her WILFREDO      Hypothyroid  Continue levothyroxine 25mg         VTE Risk Mitigation (From admission, onward)         Ordered     heparin  (porcine) injection 1,000 Units  As needed (PRN)         01/09/23 2330     Place sequential compression device  Until discontinued         01/09/23 2330                Discharge Planning   ANTHONY: 1/16/2023     Code Status: Full Code   Is the patient medically ready for discharge?: No    Reason for patient still in hospital (select all that apply): Patient trending condition, Treatment and Consult recommendations  Discharge Plan A: Home                  Kitty Dixon MD  Department of Hospital Medicine   [facility] - Intensive Care ([place]-[facility])

## 2023-01-13 NOTE — ASSESSMENT & PLAN NOTE
- goal hemoglobin 10-12, currently ~7.6  - most recent iron panel with iron 15, transferrin 138, TIBC 204, 7% saturation and ferritin 378  - transfuse for hemoglobin < 7.0  - defer IV iron in light of bacteremia, will consider epogen (was receiving at LTAC)

## 2023-01-14 LAB
ALBUMIN SERPL BCP-MCNC: 2.7 G/DL (ref 3.5–5.2)
ANION GAP SERPL CALC-SCNC: 16 MMOL/L (ref 8–16)
BUN SERPL-MCNC: 82 MG/DL (ref 8–23)
CALCIUM SERPL-MCNC: 8.7 MG/DL (ref 8.7–10.5)
CHLORIDE SERPL-SCNC: 102 MMOL/L (ref 95–110)
CO2 SERPL-SCNC: 20 MMOL/L (ref 23–29)
CREAT SERPL-MCNC: 6.9 MG/DL (ref 0.5–1.4)
EST. GFR  (NO RACE VARIABLE): 5.8 ML/MIN/1.73 M^2
GLUCOSE SERPL-MCNC: 108 MG/DL (ref 70–110)
PHOSPHATE SERPL-MCNC: 5.5 MG/DL (ref 2.7–4.5)
POTASSIUM SERPL-SCNC: 4.3 MMOL/L (ref 3.5–5.1)
SODIUM SERPL-SCNC: 138 MMOL/L (ref 136–145)
VANCOMYCIN SERPL-MCNC: 13.6 UG/ML

## 2023-01-14 PROCEDURE — 36415 COLL VENOUS BLD VENIPUNCTURE: CPT | Performed by: STUDENT IN AN ORGANIZED HEALTH CARE EDUCATION/TRAINING PROGRAM

## 2023-01-14 PROCEDURE — 36415 COLL VENOUS BLD VENIPUNCTURE: CPT | Performed by: HOSPITALIST

## 2023-01-14 PROCEDURE — 25000003 PHARM REV CODE 250: Performed by: HOSPITALIST

## 2023-01-14 PROCEDURE — 63600175 PHARM REV CODE 636 W HCPCS: Performed by: HOSPITALIST

## 2023-01-14 PROCEDURE — 12000002 HC ACUTE/MED SURGE SEMI-PRIVATE ROOM

## 2023-01-14 PROCEDURE — 99232 PR SUBSEQUENT HOSPITAL CARE,LEVL II: ICD-10-PCS | Mod: ,,, | Performed by: STUDENT IN AN ORGANIZED HEALTH CARE EDUCATION/TRAINING PROGRAM

## 2023-01-14 PROCEDURE — 99232 PR SUBSEQUENT HOSPITAL CARE,LEVL II: ICD-10-PCS | Mod: ,,, | Performed by: INTERNAL MEDICINE

## 2023-01-14 PROCEDURE — 80202 ASSAY OF VANCOMYCIN: CPT | Performed by: HOSPITALIST

## 2023-01-14 PROCEDURE — 80069 RENAL FUNCTION PANEL: CPT | Performed by: STUDENT IN AN ORGANIZED HEALTH CARE EDUCATION/TRAINING PROGRAM

## 2023-01-14 PROCEDURE — 99232 SBSQ HOSP IP/OBS MODERATE 35: CPT | Mod: ,,, | Performed by: INTERNAL MEDICINE

## 2023-01-14 PROCEDURE — 99232 SBSQ HOSP IP/OBS MODERATE 35: CPT | Mod: ,,, | Performed by: STUDENT IN AN ORGANIZED HEALTH CARE EDUCATION/TRAINING PROGRAM

## 2023-01-14 PROCEDURE — 25000003 PHARM REV CODE 250: Performed by: STUDENT IN AN ORGANIZED HEALTH CARE EDUCATION/TRAINING PROGRAM

## 2023-01-14 RX ORDER — SEVELAMER CARBONATE 800 MG/1
800 TABLET, FILM COATED ORAL
Status: DISCONTINUED | OUTPATIENT
Start: 2023-01-14 | End: 2023-01-23

## 2023-01-14 RX ORDER — SODIUM BICARBONATE 650 MG/1
650 TABLET ORAL 3 TIMES DAILY
Status: DISCONTINUED | OUTPATIENT
Start: 2023-01-14 | End: 2023-01-23

## 2023-01-14 RX ADMIN — PREDNISONE 10 MG: 5 TABLET ORAL at 11:01

## 2023-01-14 RX ADMIN — SODIUM BICARBONATE 650 MG: 650 TABLET ORAL at 09:01

## 2023-01-14 RX ADMIN — LEVOTHYROXINE SODIUM 25 MCG: 25 TABLET ORAL at 06:01

## 2023-01-14 RX ADMIN — SODIUM BICARBONATE 650 MG: 650 TABLET ORAL at 03:01

## 2023-01-14 RX ADMIN — PANTOPRAZOLE SODIUM 40 MG: 40 TABLET, DELAYED RELEASE ORAL at 03:01

## 2023-01-14 RX ADMIN — SEVELAMER CARBONATE 800 MG: 800 TABLET, FILM COATED ORAL at 05:01

## 2023-01-14 RX ADMIN — ATOVAQUONE 1500 MG: 750 SUSPENSION ORAL at 10:01

## 2023-01-14 RX ADMIN — FUROSEMIDE 40 MG: 40 TABLET ORAL at 10:01

## 2023-01-14 RX ADMIN — WHITE PETROLATUM: 1.75 OINTMENT TOPICAL at 10:01

## 2023-01-14 RX ADMIN — QUETIAPINE FUMARATE 12.5 MG: 25 TABLET ORAL at 09:01

## 2023-01-14 RX ADMIN — PANTOPRAZOLE SODIUM 40 MG: 40 TABLET, DELAYED RELEASE ORAL at 06:01

## 2023-01-14 RX ADMIN — MUPIROCIN: 20 OINTMENT TOPICAL at 10:01

## 2023-01-14 RX ADMIN — Medication 1 TABLET: at 11:01

## 2023-01-14 RX ADMIN — CARVEDILOL 25 MG: 25 TABLET, FILM COATED ORAL at 10:01

## 2023-01-14 RX ADMIN — MAGNESIUM OXIDE TAB 400 MG (241.3 MG ELEMENTAL MG) 400 MG: 400 (241.3 MG) TAB at 10:01

## 2023-01-14 RX ADMIN — VANCOMYCIN HYDROCHLORIDE 500 MG: 500 INJECTION, POWDER, LYOPHILIZED, FOR SOLUTION INTRAVENOUS at 10:01

## 2023-01-14 RX ADMIN — MUPIROCIN: 20 OINTMENT TOPICAL at 09:01

## 2023-01-14 NOTE — SUBJECTIVE & OBJECTIVE
Interval History: No fevers documented overnight. CT negative for occult source of bacteremia. Repeat blcx so far ngtd. Doing well this morning, denies issues with abx.     Review of Systems   Constitutional:  Negative for chills and fever.   Gastrointestinal:  Negative for abdominal pain, diarrhea, nausea and vomiting.   All other systems reviewed and are negative.  Objective:     Vital Signs (Most Recent):  Temp: 98.2 °F (36.8 °C) (01/14/23 0756)  Pulse: 62 (01/14/23 0756)  Resp: 18 (01/14/23 0756)  BP: (!) 140/58 (01/14/23 0756)  SpO2: (!) 94 % (01/14/23 0756)   Vital Signs (24h Range):  Temp:  [97.7 °F (36.5 °C)-98.3 °F (36.8 °C)] 98.2 °F (36.8 °C)  Pulse:  [62-78] 62  Resp:  [18] 18  SpO2:  [92 %-100 %] 94 %  BP: (129-140)/(58-65) 140/58     Weight: 60 kg (132 lb 4.4 oz)  Body mass index is 24.99 kg/m².    Estimated Creatinine Clearance: 5.8 mL/min (A) (based on SCr of 7 mg/dL (H)).    Physical Exam  Constitutional:       General: She is not in acute distress.     Appearance: She is not ill-appearing, toxic-appearing or diaphoretic.   HENT:      Head: Normocephalic and atraumatic.      Right Ear: External ear normal.      Left Ear: External ear normal.      Nose: Nose normal.   Eyes:      General: No scleral icterus.        Right eye: No discharge.         Left eye: No discharge.   Pulmonary:      Effort: Pulmonary effort is normal. No respiratory distress.      Breath sounds: No stridor. No wheezing or rhonchi.   Abdominal:      General: There is no distension.      Palpations: Abdomen is soft.      Tenderness: There is no abdominal tenderness. There is no guarding.   Musculoskeletal:      Right lower leg: No edema.      Left lower leg: No edema.   Skin:     General: Skin is warm and dry.      Coloration: Skin is not jaundiced.      Findings: No bruising, erythema, lesion or rash.      Comments: Prior HD TDC site - dressed   Neurological:      Mental Status: She is alert and oriented to person, place, and  time. Mental status is at baseline.   Psychiatric:         Mood and Affect: Mood normal.         Behavior: Behavior normal.       Significant Labs:   Microbiology Results (last 7 days)       Procedure Component Value Units Date/Time    Blood culture [[id number]] Collected: 01/11/23 0958    Order Status: Completed Specimen: Blood Updated: 01/13/23 1212     Blood Culture, Routine No growth to date      No Growth to date      No Growth to date    Narrative:      Collection has been rescheduled by [email or web address] at 01/11/2023 09:23 Reason:   Patient unavailable is a hard stick Suzy [id number]  Collection has been rescheduled by [email or web address] at 01/11/2023 09:23 Reason:   Patient unavailable is a hard stick Suzy [id number]    Blood culture [[id number]] Collected: 01/11/23 0958    Order Status: Completed Specimen: Blood Updated: 01/13/23 1212     Blood Culture, Routine No growth to date      No Growth to date      No Growth to date    Narrative:      Collection has been rescheduled by [email or web address] at 01/11/2023 09:23 Reason:   Patient unavailable is a hard stick Suzy [id number]  Collection has been rescheduled by [email or web address] at 01/11/2023 09:23 Reason:   Patient unavailable is a hard stick Suzy [id number]    Blood culture [[id number]]  (Abnormal) Collected: 01/09/23 2348    Order Status: Completed Specimen: Blood from Peripheral, Lower Arm, Right Updated: 01/12/23 1040     Blood Culture, Routine Gram stain sumi bottle: Gram positive cocci in chains resembling Strep      Results called to and read back by: Ej Haley RN  01/10/2023  15:49      ENTEROCOCCUS FAECALIS  For susceptibility see order #[id number]      Narrative:      Right Peripheral    Blood culture [[id number]]  (Abnormal)  (Susceptibility) Collected: 01/09/23 2348    Order Status: Completed Specimen: Blood from Peripheral, Lower Arm, Left Updated: 01/12/23 1039     Blood Culture, Routine Gram stain sumi bottle: Gram positive cocci in chains resembling Strep      Positive results previously called 01/10/2023       ENTEROCOCCUS FAECALIS    Narrative:      Left Peripheral    Blood culture [[id number]]     Order Status: Canceled Specimen: Blood     Blood culture [[id number]]     Order Status: Canceled Specimen: Blood     Blood culture [[id number]]     Order Status: Canceled Specimen: Blood             Significant Imaging: I have reviewed all pertinent imaging results/findings within the past 24 hours.

## 2023-01-14 NOTE — ASSESSMENT & PLAN NOTE
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 Now iHD for which she receives HD on one but usually twice weekly for metabolic clearance via tunneled HD catheter roughly x2 a week with last HD Friday (01/06/2023).    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  2)  Necrotizing cresentic glomerulonephritis/[id number] necrotizing cresecentic glomerulonephritis.  3)  Focal acute pyelonephritis.  4)  Mild-to-moderate arterionephrosclerosis    Plan/Recommendations:  - no indication for urgent RRT at this time.   - Start Bicarb tabs 650mg TID  - Start renvela 800mg TID with meals  - Start Lokelma 10g QD if K>5.   - Per ID recs okay to place line once blood cultures negative x 48hrs. IR consulted plan to place perm cath on 01/17.   - can continue Lasix 40 mg daily for now   - currently on prednisone taper 10 mg daily x5 days followed by 5 mg daily with atovaquone 1.5 grams faily for PJP prophylaxis   - renal diet if not NPO  - strict I/Os and daily weights  - daily RFPs and magnesium level  - renally dose all medications to eGFR  - avoid nephrotoxic agents when feasible (i.e. intra-arterial contrast, NSAIDs, supra-therapeutic vancomycin troughs, etc.)

## 2023-01-14 NOTE — PROGRESS NOTES
[facility] ([place] [address])  Nephrology  Progress Note    Patient Name: Kristin Goodman  MRN: [id number]  Admission Date: 1/9/2023  Hospital Length of Stay: 5 days  Attending Provider: Kitty Dixon MD   Primary Care Physician: Lori Hernandez MD  Principal Problem:Bacteremia due to Enterococcus    Subjective:     HPI: Ms Goodman is a 77-year-old woman with hypertension, hypothyroidism, HFpEF (most recent TTE with EF 65% with G1DD), pulmonary hypertension, current ESBL E. coli UTI, osteoarthritis, chronic back pain, DONAVAN and microscopic polyangiitis complicated base on biopsy from 10/26 by renal failure, GIB and respiratory failure with history of diffuse alveolar hemorrhage s/p IV Solumedrol, PLEX x5 sessions, Rituximab x4 doses and cyclophosphamide however now  just on steroid taper (cyclophosphamide appears to be hold secondary to anemia) now iHD dependent twice weekly via tunneled HD catheter for metabolic clearance (follows with Dr. Mojica with [facility]) who presented from [facility] for TDC removal in the setting of positive blood cultures growing Enterococcus faecalis and Bacillus species from cultures obtained on 1/5 & 1/7. In addition patient with reported syncopal event and sluggish flows on HD on 1/9 (incomplete session, daughter attributes to iron infusion) with last full session of iHD being on 1/6. She reports still making good urine without any urinary complaints today. Nephrology has been consulted for inpatient HD needs.      Interval History:     No acute events overnight. No SOB, or lower extremity edema. Denies any uremic symptoms.    Review of patient's allergies indicates:   Allergen Reactions    Ampicillin     Peaches [peach (prunus persica)] Other (See Comments)     Pt unable to state type of reaction. Information obtained from daughter who states she was informed of allergy from patient.    Penicillins      Other reaction(s): Hives, anaphylaxis    Sulfa  (sulfonamide antibiotics) Rash and Hives     Current Facility-Administered Medications   Medication Frequency    acetaminophen tablet 650 mg Q4H PRN    albuterol-ipratropium 2.5 mg-0.5 mg/3 mL nebulizer solution 3 mL Q4H PRN    aluminum-magnesium hydroxide 200-200 mg/5 mL suspension 30 mL QID PRN    atovaquone 750 mg/5 mL oral liquid 1,500 mg Daily    B-complex with vitamin C tablet 1 tablet Daily    bisacodyL suppository 10 mg Daily PRN    carvediloL tablet 25 mg BID    cloNIDine tablet 0.1 mg Q8H PRN    furosemide tablet 40 mg Daily    heparin (porcine) injection 1,000 Units PRN    levothyroxine tablet 25 mcg Before breakfast    magnesium oxide tablet 400 mg Daily    meclizine tablet 25 mg TID PRN    melatonin tablet 6 mg Nightly PRN    methocarbamoL tablet 500 mg TID PRN    mupirocin 2 % ointment BID    naloxone 0.4 mg/mL injection 0.02 mg PRN    ondansetron injection 4 mg Q8H PRN    pantoprazole EC tablet 40 mg BID AC    [START ON 1/15/2023] predniSONE tablet 5 mg Daily    prochlorperazine injection Soln 5 mg Q6H PRN    quetiapine split tablet 12.5 mg QHS    senna-docusate 8.6-50 mg per tablet 1 tablet BID PRN    simethicone chewable tablet 80 mg QID PRN    sodium chloride 0.9% flush 10 mL PRN    sodium chloride 0.9% flush 10 mL Q6H PRN    sucralfate tablet 1 g TID PRN    vancomycin - pharmacy to dose pharmacy to manage frequency    white petrolatum 41 % ointment Daily     Facility-Administered Medications Ordered in Other Encounters   Medication Frequency    [MAR Hold - Suspended Admission] 0.9%  NaCl infusion (for blood administration) Q24H PRN    [MAR Hold - Suspended Admission] 0.9%  NaCl infusion PRN    [MAR Hold - Suspended Admission] 0.9%  NaCl infusion Once    [MAR Hold - Suspended Admission] acetaminophen tablet 650 mg Q4H PRN    [MAR Hold - Suspended Admission] albuterol-ipratropium 2.5 mg-0.5 mg/3 mL nebulizer solution 3 mL Q4H PRN    [MAR Hold - Suspended Admission]  alteplase injection 2 mg PRN    [MAR Hold - Suspended Admission] alteplase injection 2 mg PRN    [MAR Hold - Suspended Admission] aluminum-magnesium hydroxide 200-200 mg/5 mL suspension 30 mL QID PRN    [MAR Hold - Suspended Admission] atovaquone 750 mg/5 mL oral liquid 1,500 mg Daily    [MAR Hold - Suspended Admission] B complex-vitamin C-folic acid 0.8 mg Tab 0.8 mg Daily    [MAR Hold - Suspended Admission] bisacodyL suppository 10 mg Daily PRN    [MAR Hold - Suspended Admission] carvediloL tablet 25 mg BID    celecoxib capsule 400 mg Once    [MAR Hold - Suspended Admission] cloNIDine tablet 0.1 mg Q8H PRN    [MAR Hold - Suspended Admission] dextrose 10% bolus 125 mL 125 mL PRN    [MAR Hold - Suspended Admission] dextrose 10% bolus 250 mL 250 mL PRN    [MAR Hold - Suspended Admission] epoetin hira-epbx injection 10,000 Units Every Mon, Wed, Fri    fentaNYL 50 mcg/mL injection  mcg PRN    [MAR Hold - Suspended Admission] furosemide tablet 40 mg Daily    [MAR Hold - Suspended Admission] heparin (porcine) injection 1,000 Units PRN    [MAR Hold - Suspended Admission] heparin, porcine (PF) 100 unit/mL injection flush 500 Units PRN    [MAR Hold - Suspended Admission] heparin, porcine (PF) 100 unit/mL injection flush 500 Units PRN    [MAR Hold - Suspended Admission] levothyroxine tablet 25 mcg Before breakfast    LIDOcaine (PF) 10 mg/ml (1%) injection 10 mg Once PRN    LIDOcaine (PF) 10 mg/ml (1%) injection 10 mg Once    [MAR Hold - Suspended Admission] magnesium oxide tablet 400 mg Daily    [MAR Hold - Suspended Admission] meclizine tablet 25 mg TID PRN    [MAR Hold - Suspended Admission] melatonin tablet 6 mg Nightly PRN    [MAR Hold - Suspended Admission] methocarbamoL tablet 500 mg TID PRN    [MAR Hold - Suspended Admission] metoclopramide HCl injection 5 mg Q6H PRN    midazolam (VERSED) 1 mg/mL injection 0.5-4 mg PRN    [MAR Hold - Suspended Admission] naloxone 0.4 mg/mL injection  0.02 mg PRN    [MAR Hold - Suspended Admission] ondansetron injection 4 mg Q8H PRN    [MAR Hold - Suspended Admission] pantoprazole EC tablet 40 mg BID AC    [MAR Hold - Suspended Admission] predniSONE tablet 10 mg with lunch    [MAR Hold - Suspended Admission] predniSONE tablet 5 mg Daily    [MAR Hold - Suspended Admission] prochlorperazine injection Soln 5 mg Q6H PRN    [MAR Hold - Suspended Admission] quetiapine split tablet 12.5 mg QHS    ropivacaine 0.2% [facility] PainPRO Pump infusion 500 ML Continuous    [MAR Hold - Suspended Admission] senna-docusate 8.6-50 mg per tablet 1 tablet BID PRN    [MAR Hold - Suspended Admission] simethicone chewable tablet 80 mg QID PRN    [MAR Hold - Suspended Admission] sodium chloride 0.9% bolus 250 mL 250 mL PRN    [MAR Hold - Suspended Admission] sodium chloride 0.9% bolus 250 mL 250 mL PRN    [MAR Hold - Suspended Admission] sodium chloride 0.9% flush 10 mL PRN    [MAR Hold - Suspended Admission] sodium chloride 0.9% flush 10 mL PRN    [MAR Hold - Suspended Admission] sodium chloride 0.9% flush 10 mL Q6H    And    [MAR Hold - Suspended Admission] sodium chloride 0.9% flush 10 mL PRN    [MAR Hold - Suspended Admission] sucralfate tablet 1 g TID PRN    [MAR Hold - Suspended Admission] vancomycin - pharmacy to dose pharmacy to manage frequency    [MAR Hold - Suspended Admission] white petrolatum 41 % ointment Daily       Objective:     Vital Signs (Most Recent):  Temp: 98 °F (36.7 °C) (01/14/23 1148)  Pulse: 66 (01/14/23 1148)  Resp: 16 (01/14/23 1148)  BP: (!) 117/53 (01/14/23 1148)  SpO2: 100 % (01/14/23 1148)   Vital Signs (24h Range):  Temp:  [97.7 °F (36.5 °C)-98.3 °F (36.8 °C)] 98 °F (36.7 °C)  Pulse:  [62-78] 66  Resp:  [16-18] 16  SpO2:  [92 %-100 %] 100 %  BP: (117-140)/(53-65) 117/53     Weight: 60 kg (132 lb 4.4 oz) (01/12/23 0400)  Body mass index is 24.99 kg/m².  Body surface area is 1.61 meters squared.    No intake/output data recorded.    Physical  Exam  Vitals reviewed.   Constitutional:       General: She is not in acute distress.     Appearance: She is not toxic-appearing.   HENT:      Head: Normocephalic and atraumatic.   Eyes:      General: No scleral icterus.     Extraocular Movements: Extraocular movements intact.   Pulmonary:      Effort: Pulmonary effort is normal. No respiratory distress.   Musculoskeletal:      Right lower leg: No edema.      Left lower leg: No edema.   Neurological:      General: No focal deficit present.      Mental Status: She is alert. Mental status is at baseline.   Psychiatric:         Behavior: Behavior normal.         Thought Content: Thought content normal.       Significant Labs:  All labs within the past 24 hours have been reviewed.     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 Now iHD for which she receives HD on one but usually twice weekly for metabolic clearance via tunneled HD catheter roughly x2 a week with last HD Friday (01/06/2023).    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  2)  Necrotizing cresentic glomerulonephritis/[id number] necrotizing cresecentic glomerulonephritis.  3)  Focal acute pyelonephritis.  4)  Mild-to-moderate arterionephrosclerosis    Plan/Recommendations:  - no indication for urgent RRT at this time.   - Start Bicarb tabs 650mg TID  - Start renvela 800mg TID with meals  - Start Lokelma 10g QD if K>5.   - Per ID recs okay to place line once blood cultures negative x 48hrs. IR consulted plan to place perm cath on 01/17.   - can continue Lasix 40 mg daily for now   - currently on prednisone taper 10 mg daily x5 days followed by 5 mg daily with atovaquone 1.5 grams faily for PJP prophylaxis   - renal diet if not NPO  - strict I/Os and daily weights  - daily RFPs and magnesium level  - renally dose all medications to eGFR  - avoid nephrotoxic agents when feasible (i.e. intra-arterial contrast, NSAIDs, supra-therapeutic vancomycin troughs, etc.)        Thank you for your consult. I will follow-up with patient. Please contact us if you have any additional questions.     Case discussed with attending. Attestation to follow.       Chris Leyva DO  Nephrology  [facility] ([place])

## 2023-01-14 NOTE — ASSESSMENT & PLAN NOTE
-due to Microscopic Polyangiitis  -HD on hold due to line holiday. HDS, euvolemic, labs reviewed - no emergent indication for HD today  -bcxs ng x 48 hours, ID is ok with hd cath insertion today. IR consulted for permcath placement -> scheduled 1/17/23 at the earliest. Notified Nephrology.  -renal function panel daily  -lasix

## 2023-01-14 NOTE — PROGRESS NOTES
[facility] ([address] Medicine  Progress Note    Patient Name: Kristin Goodman  MRN: [id number]  Patient Class: IP- Inpatient   Admission Date: 1/9/2023  Length of Stay: 5 days  Attending Physician: Kitty Dixon MD  Primary Care Provider: Lori Hernandez MD        Subjective:     Principal Problem:Bacteremia due to Enterococcus        HPI:  77-year-old  woman with HTN, hypothyroidism, HFpEF, and osteoarthritis and new acute renal failure due to new diagnosis of Microscopic Polyangiitis s/p renal biopsy and requiring hemodialysis is transferred from [facility] for concerns of new bacteremia.     She was hospitalized from 10/17/22-12/13/22 then transferred to [facility].  Microscopic polyangiitis with pulmonary and renal involvement had suffered respiratory failure with diffuse alveolar hemorrhage, gi bleeding, and renal replacement therapy. S/p Rheumatology consultation and pulse IV steroids + plasmapheresis with Transfusion medicine started on Rituximab and Cyclophosphamide.  Continues on Steroid taper for immunosuppression.     More recently earlier this week noted to have a urine culture grow ESBL E.coli Cystitis, started on meropenem, then she had blood cultures from 1/5 and 1/7 that have grown Enterococcus (amp sensitive) vancomycin started today, patient had sluggish HD catheter with dialysis.   Also ED report that patient may have had syncope during HD today.     She is transferred for continued infectious workup and management as well as removal of tunneled HD line for line holiday.       Overview/Hospital Course:  Seen by ID and nephrology. Plan for line holiday and IR consulted.      Interval History: no major events, ct re-scheduled for today. HDS, afebrile, increased uop with diuretics. Updated sister at bedside.    Review of Systems   Constitutional:  Negative for chills and fever.   Respiratory:  Negative for shortness of breath.    Cardiovascular:   Negative for chest pain.   Gastrointestinal:  Negative for abdominal pain, diarrhea, nausea and vomiting.   All other systems reviewed and are negative.  Objective:   VS reviewed in chart    Physical Exam  Vitals reviewed.   Constitutional:       General: She is not in acute distress.     Appearance: She is not toxic-appearing.   HENT:      Head: Normocephalic and atraumatic.   Eyes:      General: No scleral icterus.     Extraocular Movements: Extraocular movements intact.   Pulmonary:      Effort: Pulmonary effort is normal. No respiratory distress.   Musculoskeletal:      Right lower leg: No edema.      Left lower leg: No edema.   Neurological:      General: No focal deficit present.      Mental Status: She is alert. Mental status is at baseline.   Psychiatric:         Behavior: Behavior normal.         Thought Content: Thought content normal.       Significant Labs: All pertinent labs within the past 24 hours have been reviewed.    Significant Imaging: I have reviewed all pertinent imaging results/findings within the past 24 hours.      Assessment/Plan:      * Bacteremia due to Enterococcus  Positive blood cxs 1/5, 1/7, 1/9. Received 1 gm IV vancomycin at LTAC on 1/10.   HD tunelled cath removed 1/10.  Also has midline from LTAC - removed. Surface echo - no vegetation.  -infectious disease consultation appreciated  -last surveillance bcxs 1/11/23 ngtd  -continue Vanc  -ct abd/pelvis with oral contrast today per ID recs   -d/w ID - 2-weeks vanc from negative bcxs/line removal if ct is negative         Urinary tract infection due to extended-spectrum beta lactamase (ESBL) producing Escherichia coli  Urine culture from 1/05 with ESBL E.coli   -Ertapenem renal dosing 500mg IV q24 ordered, completed 5 days (1/5-1/10). Missed dose on 1/9 due to transfer to hospital    Anemia due to chronic kidney disease  Has required transfusions during recent inpatient/LTAC stays   -monitor CBC  -was receiving EPO infusion at  nephrology direction.       Primary pauci-immune necrotizing and crescentic glomerulonephritis  Patient with acute kidney injury likely due to microscopic polyangiits with granulomatosis (MPA) WILFREDO is currently stable. Labs reviewed- Renal function/electrolytes with Estimated Creatinine Clearance: 6.3 mL/min (A) (based on SCr of 6.4 mg/dL (H)). according to latest data. Monitor urine output and serial BMP and adjust therapy as needed. Avoid nephrotoxins and renally dose meds for GFR listed above.  Had complex course with DAH, requiring pulse dose steroids, plasmapheresis and then rituximab and cyclophosphamide    -continue prednisone taper  -continue atovaquone for PJP ppx  -will need outpatient rheum follow up    Gastric reflux  Continue BID ppi      Dysphagia  Continue renal diet       Acute renal failure on dialysis  -due to Microscopic Polyangiitis  -HD on hold due to line holiday. HDS, euvolemic, labs reviewed - no emergent indication for HD today  -bcxs ng x 48 hours, ID is ok with hd cath insertion today. IR consulted for permcath placement -> scheduled 1/17/23 at the earliest. Notified Nephrology.  -renal function panel daily  -lasix       Microscopic polyangiitis  -continue steroid taper   -patient is on OI prophylaxis with atovaquone 1500mg po daily  -continued on protonix 40mg po bid while on glucocorticoids.       Chronic diastolic heart failure  Has preserved EF   -HD was primary volume maintenance   -continue lasix 40mg po daily - discuss with nephrology if higher or more frequent doses are required during her LIne holiday       Syncope  Report of possible syncope with HD,  Dizzy spells noted per LTAC notes - pt s/p MRI brain on 12/8 w/o acute findings   -neurology prior eval has recommended PT/OT for vestibular therapy  -Future outpatient cardiology visit for possible tilt-table eval.   -refer to neurology at discharge for BPPV f/u       Primary hypertension  Continue carvedilol  -home lisinopril is on  hold due to her WILFREDO      Hypothyroid  Continue levothyroxine 25mg         VTE Risk Mitigation (From admission, onward)         Ordered     heparin (porcine) injection 1,000 Units  As needed (PRN)         01/09/23 2330     Place sequential compression device  Until discontinued         01/09/23 2330                Discharge Planning   ANTHONY: 1/16/2023     Code Status: Full Code   Is the patient medically ready for discharge?: No    Reason for patient still in hospital (select all that apply): Patient trending condition, Treatment and Consult recommendations  Discharge Plan A: Rehab   Discharge Delays: None known at this time              Kitty Dixon MD  Department of Hospital Medicine   [facility] - Intensive Care ([place])

## 2023-01-14 NOTE — SUBJECTIVE & OBJECTIVE
Interval History:     No acute events overnight. No SOB, or lower extremity edema. Denies any uremic symptoms.    Review of patient's allergies indicates:   Allergen Reactions    Ampicillin     Peaches [peach (prunus persica)] Other (See Comments)     Pt unable to state type of reaction. Information obtained from daughter who states she was informed of allergy from patient.    Penicillins      Other reaction(s): Hives, anaphylaxis    Sulfa (sulfonamide antibiotics) Rash and Hives     Current Facility-Administered Medications   Medication Frequency    acetaminophen tablet 650 mg Q4H PRN    albuterol-ipratropium 2.5 mg-0.5 mg/3 mL nebulizer solution 3 mL Q4H PRN    aluminum-magnesium hydroxide 200-200 mg/5 mL suspension 30 mL QID PRN    atovaquone 750 mg/5 mL oral liquid 1,500 mg Daily    B-complex with vitamin C tablet 1 tablet Daily    bisacodyL suppository 10 mg Daily PRN    carvediloL tablet 25 mg BID    cloNIDine tablet 0.1 mg Q8H PRN    furosemide tablet 40 mg Daily    heparin (porcine) injection 1,000 Units PRN    levothyroxine tablet 25 mcg Before breakfast    magnesium oxide tablet 400 mg Daily    meclizine tablet 25 mg TID PRN    melatonin tablet 6 mg Nightly PRN    methocarbamoL tablet 500 mg TID PRN    mupirocin 2 % ointment BID    naloxone 0.4 mg/mL injection 0.02 mg PRN    ondansetron injection 4 mg Q8H PRN    pantoprazole EC tablet 40 mg BID AC    [START ON 1/15/2023] predniSONE tablet 5 mg Daily    prochlorperazine injection Soln 5 mg Q6H PRN    quetiapine split tablet 12.5 mg QHS    senna-docusate 8.6-50 mg per tablet 1 tablet BID PRN    simethicone chewable tablet 80 mg QID PRN    sodium chloride 0.9% flush 10 mL PRN    sodium chloride 0.9% flush 10 mL Q6H PRN    sucralfate tablet 1 g TID PRN    vancomycin - pharmacy to dose pharmacy to manage frequency    white petrolatum 41 % ointment Daily     Facility-Administered Medications Ordered in Other Encounters   Medication Frequency    [MAR Hold -  Suspended Admission] 0.9%  NaCl infusion (for blood administration) Q24H PRN    [MAR Hold - Suspended Admission] 0.9%  NaCl infusion PRN    [MAR Hold - Suspended Admission] 0.9%  NaCl infusion Once    [MAR Hold - Suspended Admission] acetaminophen tablet 650 mg Q4H PRN    [MAR Hold - Suspended Admission] albuterol-ipratropium 2.5 mg-0.5 mg/3 mL nebulizer solution 3 mL Q4H PRN    [MAR Hold - Suspended Admission] alteplase injection 2 mg PRN    [MAR Hold - Suspended Admission] alteplase injection 2 mg PRN    [MAR Hold - Suspended Admission] aluminum-magnesium hydroxide 200-200 mg/5 mL suspension 30 mL QID PRN    [MAR Hold - Suspended Admission] atovaquone 750 mg/5 mL oral liquid 1,500 mg Daily    [MAR Hold - Suspended Admission] B complex-vitamin C-folic acid 0.8 mg Tab 0.8 mg Daily    [MAR Hold - Suspended Admission] bisacodyL suppository 10 mg Daily PRN    [MAR Hold - Suspended Admission] carvediloL tablet 25 mg BID    celecoxib capsule 400 mg Once    [MAR Hold - Suspended Admission] cloNIDine tablet 0.1 mg Q8H PRN    [MAR Hold - Suspended Admission] dextrose 10% bolus 125 mL 125 mL PRN    [MAR Hold - Suspended Admission] dextrose 10% bolus 250 mL 250 mL PRN    [MAR Hold - Suspended Admission] epoetin hira-epbx injection 10,000 Units Every Mon, Wed, Fri    fentaNYL 50 mcg/mL injection  mcg PRN    [MAR Hold - Suspended Admission] furosemide tablet 40 mg Daily    [MAR Hold - Suspended Admission] heparin (porcine) injection 1,000 Units PRN    [MAR Hold - Suspended Admission] heparin, porcine (PF) 100 unit/mL injection flush 500 Units PRN    [MAR Hold - Suspended Admission] heparin, porcine (PF) 100 unit/mL injection flush 500 Units PRN    [MAR Hold - Suspended Admission] levothyroxine tablet 25 mcg Before breakfast    LIDOcaine (PF) 10 mg/ml (1%) injection 10 mg Once PRN    LIDOcaine (PF) 10 mg/ml (1%) injection 10 mg Once    [MAR Hold - Suspended Admission] magnesium oxide tablet 400 mg Daily    [MAR Hold -  Suspended Admission] meclizine tablet 25 mg TID PRN    [MAR Hold - Suspended Admission] melatonin tablet 6 mg Nightly PRN    [MAR Hold - Suspended Admission] methocarbamoL tablet 500 mg TID PRN    [MAR Hold - Suspended Admission] metoclopramide HCl injection 5 mg Q6H PRN    midazolam (VERSED) 1 mg/mL injection 0.5-4 mg PRN    [MAR Hold - Suspended Admission] naloxone 0.4 mg/mL injection 0.02 mg PRN    [MAR Hold - Suspended Admission] ondansetron injection 4 mg Q8H PRN    [MAR Hold - Suspended Admission] pantoprazole EC tablet 40 mg BID AC    [MAR Hold - Suspended Admission] predniSONE tablet 10 mg with lunch    [MAR Hold - Suspended Admission] predniSONE tablet 5 mg Daily    [MAR Hold - Suspended Admission] prochlorperazine injection Soln 5 mg Q6H PRN    [MAR Hold - Suspended Admission] quetiapine split tablet 12.5 mg QHS    ropivacaine 0.2% Nimbus PainPRO Pump infusion 500 ML Continuous    [MAR Hold - Suspended Admission] senna-docusate 8.6-50 mg per tablet 1 tablet BID PRN    [MAR Hold - Suspended Admission] simethicone chewable tablet 80 mg QID PRN    [MAR Hold - Suspended Admission] sodium chloride 0.9% bolus 250 mL 250 mL PRN    [MAR Hold - Suspended Admission] sodium chloride 0.9% bolus 250 mL 250 mL PRN    [MAR Hold - Suspended Admission] sodium chloride 0.9% flush 10 mL PRN    [MAR Hold - Suspended Admission] sodium chloride 0.9% flush 10 mL PRN    [MAR Hold - Suspended Admission] sodium chloride 0.9% flush 10 mL Q6H    And    [MAR Hold - Suspended Admission] sodium chloride 0.9% flush 10 mL PRN    [MAR Hold - Suspended Admission] sucralfate tablet 1 g TID PRN    [MAR Hold - Suspended Admission] vancomycin - pharmacy to dose pharmacy to manage frequency    [MAR Hold - Suspended Admission] white petrolatum 41 % ointment Daily       Objective:     Vital Signs (Most Recent):  Temp: 98 °F (36.7 °C) (01/14/23 1148)  Pulse: 66 (01/14/23 1148)  Resp: 16 (01/14/23 1148)  BP: (!) 117/53 (01/14/23 1148)  SpO2: 100 %  (01/14/23 1148)   Vital Signs (24h Range):  Temp:  [97.7 °F (36.5 °C)-98.3 °F (36.8 °C)] 98 °F (36.7 °C)  Pulse:  [62-78] 66  Resp:  [16-18] 16  SpO2:  [92 %-100 %] 100 %  BP: (117-140)/(53-65) 117/53     Weight: 60 kg (132 lb 4.4 oz) (01/12/23 0400)  Body mass index is 24.99 kg/m².  Body surface area is 1.61 meters squared.    No intake/output data recorded.    Physical Exam  Vitals reviewed.   Constitutional:       General: She is not in acute distress.     Appearance: She is not toxic-appearing.   HENT:      Head: Normocephalic and atraumatic.   Eyes:      General: No scleral icterus.     Extraocular Movements: Extraocular movements intact.   Pulmonary:      Effort: Pulmonary effort is normal. No respiratory distress.   Musculoskeletal:      Right lower leg: No edema.      Left lower leg: No edema.   Neurological:      General: No focal deficit present.      Mental Status: She is alert. Mental status is at baseline.   Psychiatric:         Behavior: Behavior normal.         Thought Content: Thought content normal.       Significant Labs:  All labs within the past 24 hours have been reviewed.     Significant Imaging:  Labs: Reviewed

## 2023-01-14 NOTE — PROGRESS NOTES
Pharmacokinetic Assessment Follow Up: IV Vancomycin    Vancomycin serum concentration assessment(s):    -Random level today 13.6  -No HD ordered this time, per ID ok to replace line when blood cx NGTD x 48 hours  -Last HD 01/06, normal schedule M/F  -Blood cultures drawn 01/11    Vancomycin Regimen Plan:    -Redose with vancomycin 500 mg IVPB x1 with continued daily random levels  -Monitor for line placement and re-initiation of HD    Drug levels (last 3 results):  Recent Labs   Lab Result Units 01/12/23  0552 01/13/23  0457 01/14/23  0412   Vancomycin, Random ug/mL 17.9 16.7 13.6       Pharmacy will continue to follow and monitor vancomycin.    Please contact pharmacy at extension [phone] for questions regarding this assessment.    Thank you for the consult,   Jason Styles       Patient brief summary:  Kristin Goodman is a 77 y.o. female initiated on antimicrobial therapy with IV Vancomycin for treatment of bacteremia    The patient's current regimen is Vancomycin pulse dosing    Drug Allergies:   Review of patient's allergies indicates:   Allergen Reactions    Ampicillin     Peaches [peach (prunus persica)] Other (See Comments)     Pt unable to state type of reaction. Information obtained from daughter who states she was informed of allergy from patient.    Penicillins      Other reaction(s): Hives, anaphylaxis    Sulfa (sulfonamide antibiotics) Rash and Hives       Actual Body Weight:   60 kg    Renal Function:   Estimated Creatinine Clearance: 5.8 mL/min (A) (based on SCr of 7 mg/dL (H)).,     Dialysis Method (if applicable):  intermittent HD    CBC (last 72 hours):  No results for input(s): WHITE BLOOD CELL COUNT, HEMOGLOBIN, HEMATOCRIT, PLATELETS, GRAN%, LYMPH%, MONO%, EOSINOPHIL%, BASOPHIL%, DIFFERENTIAL METHOD in the last 72 hours.    Metabolic Panel (last 72 hours):  Recent Labs   Lab Result Units 01/12/23  0552 01/13/23  0824   Sodium mmol/L 138 140   Potassium mmol/L 4.2 4.3   Chloride mmol/L 102 104   CO2  mmol/L 22* 18*   Glucose mg/dL 84 84   BUN mg/dL 72* 76*   Creatinine mg/dL 6.7* 7.0*   Albumin g/dL 2.5* 2.6*   Total Bilirubin mg/dL 0.3  --    Alkaline Phosphatase U/L 66  --    AST U/L 18  --    ALT U/L 8*  --    Magnesium mg/dL 2.3  --    Phosphorus mg/dL 5.4* 5.5*       Vancomycin Administrations:  vancomycin given in the last 96 hours                     vancomycin (VANCOCIN) 500 mg in dextrose 5 % in water (D5W) 5 % 100 mL IVPB (MB+) (mg) 500 mg New Bag 01/10/23 1629                    Microbiologic Results:  Microbiology Results (last 7 days)       Procedure Component Value Units Date/Time    Blood culture [[id number]] Collected: 01/11/23 0958    Order Status: Completed Specimen: Blood Updated: 01/13/23 1212     Blood Culture, Routine No growth to date      No Growth to date      No Growth to date    Narrative:      Collection has been rescheduled by [email or web address] at 01/11/2023 09:23 Reason:   Patient unavailable is a hard stick Suzy [id number]  Collection has been rescheduled by DNM1 at 01/11/2023 09:23 Reason:   Patient unavailable is a hard stick Suzy [id number]    Blood culture [[id number]] Collected: 01/11/23 0958    Order Status: Completed Specimen: Blood Updated: 01/13/23 1212     Blood Culture, Routine No growth to date      No Growth to date      No Growth to date    Narrative:      Collection has been rescheduled by DNM1 at 01/11/2023 09:23 Reason:   Patient unavailable is a hard stick Suzy [id number]  Collection has been rescheduled by DNM1 at 01/11/2023 09:23 Reason:   Patient unavailable is a hard stick Suzy [id number]    Blood culture [[id number]]  (Abnormal) Collected: 01/09/23 2348    Order Status: Completed Specimen: Blood from Peripheral, Lower Arm, Right Updated: 01/12/23 1040     Blood Culture, Routine Gram stain sumi bottle: Gram positive cocci in chains resembling Strep      Results called to and read back by: Ej Haley RN  01/10/2023  15:49      ENTEROCOCCUS FAECALIS  For susceptibility see order #[id number]       Narrative:      Right Peripheral    Blood culture [[id number]]  (Abnormal)  (Susceptibility) Collected: 01/09/23 9928    Order Status: Completed Specimen: Blood from Peripheral, Lower Arm, Left Updated: 01/12/23 1039     Blood Culture, Routine Gram stain sumi bottle: Gram positive cocci in chains resembling Strep      Positive results previously called 01/10/2023      ENTEROCOCCUS FAECALIS    Narrative:      Left Peripheral    Blood culture [[id number]]     Order Status: Canceled Specimen: Blood     Blood culture [[id number]]     Order Status: Canceled Specimen: Blood     Blood culture [[id number]]     Order Status: Canceled Specimen: Blood

## 2023-01-14 NOTE — ASSESSMENT & PLAN NOTE
Positive blood cxs 1/5, 1/7, 1/9. Received 1 gm IV vancomycin at [facility] on 1/10.   HD tunelled cath removed 1/10.  Also has midline from [facility] - removed. Surface echo - no vegetation.  -infectious disease consultation appreciated  -last surveillance bcxs 1/11/23 ngtd  -continue Vanc  -ct abd/pelvis with oral contrast today per ID recs   -d/w ID - 2-weeks vanc from negative bcxs/line removal if ct is negative

## 2023-01-14 NOTE — PLAN OF CARE
Problem: Adult Inpatient Plan of Care  Goal: Plan of Care Review  Outcome: Ongoing, Progressing     Problem: Infection  Goal: Absence of Infection Signs and Symptoms  Outcome: Ongoing, Progressing     Problem: Fluid and Electrolyte Imbalance (Acute Kidney Injury/Impairment)  Goal: Fluid and Electrolyte Balance  Outcome: Ongoing, Progressing     Problem: Oral Intake Inadequate (Acute Kidney Injury/Impairment)  Goal: Optimal Nutrition Intake  Outcome: Ongoing, Progressing     Problem: Renal Function Impairment (Acute Kidney Injury/Impairment)  Goal: Effective Renal Function  Outcome: Ongoing, Progressing     Problem: Skin Injury Risk Increased  Goal: Skin Health and Integrity  Outcome: Ongoing, Progressing     Problem: Device-Related Complication Risk (Hemodialysis)  Goal: Safe, Effective Therapy Delivery  Outcome: Ongoing, Progressing     Problem: Hemodynamic Instability (Hemodialysis)  Goal: Effective Tissue Perfusion  Outcome: Ongoing, Progressing     Problem: Infection (Hemodialysis)  Goal: Absence of Infection Signs and Symptoms  Outcome: Ongoing, Progressing

## 2023-01-14 NOTE — PROGRESS NOTES
[facility] ([place] [address])  Infectious Disease  Progress Note    Patient Name: Kristin Goodman  MRN: [id number]  Admission Date: 1/9/2023  Length of Stay: 5 days  Attending Physician: Kitty Dixon MD  Primary Care Provider: Lori Hernandez MD    Isolation Status: No active isolations  Assessment/Plan:      * Bacteremia due to Enterococcus  Suspect source prior HD line vs transient gut translocation in setting of abdominal pain, fever, and diarrhea. Denies further abdominal pain/diarrhea. HD line removed 1/10 - repeat blcx obtained post-line removal ngtd. TTE negative and CT abdo/pelvis negative for source.     Recommendations:  -continue vancomycin pharmacy to dose, anticipate 14d course from negative blcx, holly 1/24  -ok to replace HD line as long as repeat blcx have been ngtd x 48 hr  -plan for rehab/ltac     Outpatient Antibiotic Therapy Plan for placement:    1) Infection: Efaecalis bacteremia     2) Discharge Antibiotics:    Intravenous antibiotics:   Vancomycin pharm to dose - plans to transfer to rehab         3) Therapy Duration:  2w from cleared blcx    Estimated end date of IV antibiotics: 1/24/23    4) Outpatient Weekly Labs:    Order the following labs to be drawn on Mondays:    CBC   CMP    Vancomycin trough. Target 15-20    If discharged on vancomycin IV, order the following additional labs to be drawn on Thursdays:   CMP    Vancomycin trough. Target 15-20    If vancomycin trough is not at target (15-20) prior to discharge, schedule vancomycin trough to be drawn before their fourth outpatient dose.    5) Fax Lab Results to Infectious Diseases Provider: bandar    [facility] Fax Number: [phone]    6) Outpatient Infectious Diseases Follow-up     Follow-up appointment will be arranged by the ID clinic and will be found in the patient's appointments tab.     Prior to discharge, please ensure the patient's follow-up has been scheduled.     If there is still no follow-up  scheduled prior to discharge, please send an EPIC message to [facility] in Infectious Diseases.              Microscopic polyangiitis  Continue atovaquone therapy as long as patient is on chronic steroids         Anticipated Disposition: IPR/LTAC    Thank you for your consult. I will sign off. Please contact us if you have any additional questions. Above d/w primary team.     Time: 35 minutes   50% of time spent on face-to-face counseling and coordination of care. Counseling included review of test results, diagnosis, and treatment plan with patient and/or family.        Kimberley Love MD  Infectious Disease  [facility] - Intensive [facility] ([facility]    Subjective:     Principal Problem:Bacteremia due to Enterococcus    HPI: This is a 77 year old lady with recently diagnosed ESRD 2/2 microscopic polyangiitis, HFpEF, and HTN who was transferred from [facility] for management of bacteremia. 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 Patient required dialysis through a tunneled line. Patient was started on IV steroids, Rituximab, and cyclophosphamide and was discharged to LT with plans to go to rehab after. At [facility], patient had some diarrhea and fevers. Of note, there was documentation that the tunneled line was out a couple of centimeters and there was concern for infection. Ucx grew ESBL Ecoli and blood cultures grew pansensitive E. Faecalis. patient was seen by Dr. Calvert who recommended ertapenam and Vancomycin with hospital admission to remove tunneled HD line and further infectious workup.     Patient had a prior anaphylactic reaction to penicillins prior.     ID consulted for ESBL Ecoli and E. Faecalis bacteremia.     Interval History: No fevers documented overnight. CT negative for occult source of bacteremia. Repeat blcx so far ngtd. Doing well this morning, denies  issues with abx.     Review of Systems   Constitutional:  Negative for chills and fever.   Gastrointestinal:  Negative for abdominal pain, diarrhea, nausea and vomiting.   All other systems reviewed and are negative.  Objective:     Vital Signs (Most Recent):  Temp: 98.2 °F (36.8 °C) (01/14/23 0756)  Pulse: 62 (01/14/23 0756)  Resp: 18 (01/14/23 0756)  BP: (!) 140/58 (01/14/23 0756)  SpO2: (!) 94 % (01/14/23 0756)   Vital Signs (24h Range):  Temp:  [97.7 °F (36.5 °C)-98.3 °F (36.8 °C)] 98.2 °F (36.8 °C)  Pulse:  [62-78] 62  Resp:  [18] 18  SpO2:  [92 %-100 %] 94 %  BP: (129-140)/(58-65) 140/58     Weight: 60 kg (132 lb 4.4 oz)  Body mass index is 24.99 kg/m².    Estimated Creatinine Clearance: 5.8 mL/min (A) (based on SCr of 7 mg/dL (H)).    Physical Exam  Constitutional:       General: She is not in acute distress.     Appearance: She is not ill-appearing, toxic-appearing or diaphoretic.   HENT:      Head: Normocephalic and atraumatic.      Right Ear: External ear normal.      Left Ear: External ear normal.      Nose: Nose normal.   Eyes:      General: No scleral icterus.        Right eye: No discharge.         Left eye: No discharge.   Pulmonary:      Effort: Pulmonary effort is normal. No respiratory distress.      Breath sounds: No stridor. No wheezing or rhonchi.   Abdominal:      General: There is no distension.      Palpations: Abdomen is soft.      Tenderness: There is no abdominal tenderness. There is no guarding.   Musculoskeletal:      Right lower leg: No edema.      Left lower leg: No edema.   Skin:     General: Skin is warm and dry.      Coloration: Skin is not jaundiced.      Findings: No bruising, erythema, lesion or rash.      Comments: Prior HD TDC site - dressed   Neurological:      Mental Status: She is alert and oriented to person, place, and time. Mental status is at baseline.   Psychiatric:         Mood and Affect: Mood normal.         Behavior: Behavior normal.       Significant Labs:    Microbiology Results (last 7 days)       Procedure Component Value Units Date/Time    Blood culture [[id number]] Collected: 01/11/23 0958    Order Status: Completed Specimen: Blood Updated: 01/13/23 1212     Blood Culture, Routine No growth to date      No Growth to date      No Growth to date    Narrative:      Collection has been rescheduled by DNM1 at 01/11/2023 09:23 Reason:   Patient unavailable is a hard stick Suzy [id number]  Collection has been rescheduled by DNM1 at 01/11/2023 09:23 Reason:   Patient unavailable is a hard stick Suzy [id number]    Blood culture [[id number]] Collected: 01/11/23 0958    Order Status: Completed Specimen: Blood Updated: 01/13/23 1212     Blood Culture, Routine No growth to date      No Growth to date      No Growth to date    Narrative:      Collection has been rescheduled by DNM1 at 01/11/2023 09:23 Reason:   Patient unavailable is a hard stick Suzy [id number]  Collection has been rescheduled by DNM1 at 01/11/2023 09:23 Reason:   Patient unavailable is a hard stick Suzy 11293    Blood culture [[id number]]  (Abnormal) Collected: 01/09/23 2348    Order Status: Completed Specimen: Blood from Peripheral, Lower Arm, Right Updated: 01/12/23 1040     Blood Culture, Routine Gram stain sumi bottle: Gram positive cocci in chains resembling Strep      Results called to and read back by: Ej Haley RN  01/10/2023  15:49      ENTEROCOCCUS FAECALIS  For susceptibility see order #[id number]      Narrative:      Right Peripheral    Blood culture [[id number]]  (Abnormal)  (Susceptibility) Collected: 01/09/23 2348    Order Status: Completed Specimen: Blood from Peripheral, Lower Arm, Left Updated: 01/12/23 1039     Blood Culture, Routine Gram stain sumi bottle: Gram positive cocci in chains resembling Strep      Positive results previously called 01/10/2023      ENTEROCOCCUS FAECALIS    Narrative:      Left Peripheral    Blood culture [[id number]]     Order Status: Canceled Specimen: Blood     Blood culture  [[id number]]     Order Status: Canceled Specimen: Blood     Blood culture [[id number]]     Order Status: Canceled Specimen: Blood             Significant Imaging: I have reviewed all pertinent imaging results/findings within the past 24 hours.

## 2023-01-14 NOTE — NURSING
"Upon making pt rounds, pt was seen ambulating with rolling walker with brief and pants down. Pt has been educated on the use and purpose of call light. Pt states "she doesn't want to bother staff because we're busy." Nurse informed pt on fall risk prevention and use of call light. Will continue to monitor.   "

## 2023-01-14 NOTE — CONSULTS
Inpatient consult to Physical Medicine Rehab  Consult performed by: Madison Tee NP  Consult ordered by: Kitty Dixon MD    Consult received.  Reviewed patient history and current admission.  PM&R following.    Madison Tee NP  Physical Medicine & Rehabilitation   01/14/2023

## 2023-01-14 NOTE — ASSESSMENT & PLAN NOTE
Suspect source prior HD line vs transient gut translocation in setting of abdominal pain, fever, and diarrhea. Denies further abdominal pain/diarrhea. HD line removed 1/10 - repeat blcx obtained post-line removal ngtd. TTE negative and CT abdo/pelvis negative for source.     Recommendations:  -continue vancomycin pharmacy to dose, anticipate 14d course from negative blcx, holly 1/24  -ok to replace HD line as long as repeat blcx have been ngtd x 48 hr  -plan for rehab/ltac     Outpatient Antibiotic Therapy Plan for placement:    1) Infection: Efaecalis bacteremia     2) Discharge Antibiotics:    Intravenous antibiotics:   Vancomycin pharm to dose - plans to transfer to rehab         3) Therapy Duration:  2w from cleared blcx    Estimated end date of IV antibiotics: 1/24/23    4) Outpatient Weekly Labs:    Order the following labs to be drawn on Mondays:    CBC   CMP    Vancomycin trough. Target 15-20    If discharged on vancomycin IV, order the following additional labs to be drawn on Thursdays:   CMP    Vancomycin trough. Target 15-20    If vancomycin trough is not at target (15-20) prior to discharge, schedule vancomycin trough to be drawn before their fourth outpatient dose.    5) Fax Lab Results to Infectious Diseases Provider: bandar    [facility] Fax Number: [phone]    6) Outpatient Infectious Diseases Follow-up     Follow-up appointment will be arranged by the ID clinic and will be found in the patient's appointments tab.     Prior to discharge, please ensure the patient's follow-up has been scheduled.     If there is still no follow-up scheduled prior to discharge, please send an EPIC message to [facility] in [facility].

## 2023-01-14 NOTE — SUBJECTIVE & OBJECTIVE
Interval History: no major events, ct re-scheduled for today. HDS, afebrile, increased uop with diuretics. Updated sister at bedside.    Review of Systems   Constitutional:  Negative for chills and fever.   Respiratory:  Negative for shortness of breath.    Cardiovascular:  Negative for chest pain.   Gastrointestinal:  Negative for abdominal pain, diarrhea, nausea and vomiting.   All other systems reviewed and are negative.  Objective:   VS reviewed in chart    Physical Exam  Vitals reviewed.   Constitutional:       General: She is not in acute distress.     Appearance: She is not toxic-appearing.   HENT:      Head: Normocephalic and atraumatic.   Eyes:      General: No scleral icterus.     Extraocular Movements: Extraocular movements intact.   Pulmonary:      Effort: Pulmonary effort is normal. No respiratory distress.   Musculoskeletal:      Right lower leg: No edema.      Left lower leg: No edema.   Neurological:      General: No focal deficit present.      Mental Status: She is alert. Mental status is at baseline.   Psychiatric:         Behavior: Behavior normal.         Thought Content: Thought content normal.       Significant Labs: All pertinent labs within the past 24 hours have been reviewed.    Significant Imaging: I have reviewed all pertinent imaging results/findings within the past 24 hours.

## 2023-01-15 LAB
ALBUMIN SERPL BCP-MCNC: 2.6 G/DL (ref 3.5–5.2)
ANION GAP SERPL CALC-SCNC: 17 MMOL/L (ref 8–16)
BUN SERPL-MCNC: 88 MG/DL (ref 8–23)
CALCIUM SERPL-MCNC: 8.5 MG/DL (ref 8.7–10.5)
CHLORIDE SERPL-SCNC: 105 MMOL/L (ref 95–110)
CO2 SERPL-SCNC: 19 MMOL/L (ref 23–29)
CREAT SERPL-MCNC: 7.3 MG/DL (ref 0.5–1.4)
EST. GFR  (NO RACE VARIABLE): 5.4 ML/MIN/1.73 M^2
GLUCOSE SERPL-MCNC: 124 MG/DL (ref 70–110)
PHOSPHATE SERPL-MCNC: 4.8 MG/DL (ref 2.7–4.5)
POTASSIUM SERPL-SCNC: 4.1 MMOL/L (ref 3.5–5.1)
SODIUM SERPL-SCNC: 141 MMOL/L (ref 136–145)
VANCOMYCIN SERPL-MCNC: 17.6 UG/ML

## 2023-01-15 PROCEDURE — 25000003 PHARM REV CODE 250: Performed by: HOSPITALIST

## 2023-01-15 PROCEDURE — 80069 RENAL FUNCTION PANEL: CPT | Performed by: STUDENT IN AN ORGANIZED HEALTH CARE EDUCATION/TRAINING PROGRAM

## 2023-01-15 PROCEDURE — 80202 ASSAY OF VANCOMYCIN: CPT | Performed by: HOSPITALIST

## 2023-01-15 PROCEDURE — 63600175 PHARM REV CODE 636 W HCPCS: Performed by: HOSPITALIST

## 2023-01-15 PROCEDURE — 36415 COLL VENOUS BLD VENIPUNCTURE: CPT | Performed by: STUDENT IN AN ORGANIZED HEALTH CARE EDUCATION/TRAINING PROGRAM

## 2023-01-15 PROCEDURE — 36415 COLL VENOUS BLD VENIPUNCTURE: CPT | Performed by: HOSPITALIST

## 2023-01-15 PROCEDURE — 99232 PR SUBSEQUENT HOSPITAL CARE,LEVL II: ICD-10-PCS | Mod: ,,, | Performed by: INTERNAL MEDICINE

## 2023-01-15 PROCEDURE — 97116 GAIT TRAINING THERAPY: CPT | Mod: CQ

## 2023-01-15 PROCEDURE — 12000002 HC ACUTE/MED SURGE SEMI-PRIVATE ROOM

## 2023-01-15 PROCEDURE — 25000003 PHARM REV CODE 250: Performed by: STUDENT IN AN ORGANIZED HEALTH CARE EDUCATION/TRAINING PROGRAM

## 2023-01-15 PROCEDURE — 99232 SBSQ HOSP IP/OBS MODERATE 35: CPT | Mod: ,,, | Performed by: INTERNAL MEDICINE

## 2023-01-15 RX ADMIN — LEVOTHYROXINE SODIUM 25 MCG: 25 TABLET ORAL at 05:01

## 2023-01-15 RX ADMIN — SODIUM BICARBONATE 650 MG: 650 TABLET ORAL at 08:01

## 2023-01-15 RX ADMIN — PANTOPRAZOLE SODIUM 40 MG: 40 TABLET, DELAYED RELEASE ORAL at 04:01

## 2023-01-15 RX ADMIN — SEVELAMER CARBONATE 800 MG: 800 TABLET, FILM COATED ORAL at 12:01

## 2023-01-15 RX ADMIN — SEVELAMER CARBONATE 800 MG: 800 TABLET, FILM COATED ORAL at 04:01

## 2023-01-15 RX ADMIN — FUROSEMIDE 40 MG: 40 TABLET ORAL at 12:01

## 2023-01-15 RX ADMIN — CARVEDILOL 25 MG: 25 TABLET, FILM COATED ORAL at 12:01

## 2023-01-15 RX ADMIN — QUETIAPINE FUMARATE 12.5 MG: 25 TABLET ORAL at 08:01

## 2023-01-15 RX ADMIN — PREDNISONE 5 MG: 5 TABLET ORAL at 12:01

## 2023-01-15 RX ADMIN — WHITE PETROLATUM: 1.75 OINTMENT TOPICAL at 12:01

## 2023-01-15 RX ADMIN — SODIUM BICARBONATE 650 MG: 650 TABLET ORAL at 12:01

## 2023-01-15 RX ADMIN — ATOVAQUONE 1500 MG: 750 SUSPENSION ORAL at 12:01

## 2023-01-15 RX ADMIN — PANTOPRAZOLE SODIUM 40 MG: 40 TABLET, DELAYED RELEASE ORAL at 05:01

## 2023-01-15 RX ADMIN — MUPIROCIN: 20 OINTMENT TOPICAL at 12:01

## 2023-01-15 RX ADMIN — MAGNESIUM OXIDE TAB 400 MG (241.3 MG ELEMENTAL MG) 400 MG: 400 (241.3 MG) TAB at 12:01

## 2023-01-15 RX ADMIN — SODIUM BICARBONATE 650 MG: 650 TABLET ORAL at 04:01

## 2023-01-15 RX ADMIN — MUPIROCIN: 20 OINTMENT TOPICAL at 08:01

## 2023-01-15 RX ADMIN — VANCOMYCIN HYDROCHLORIDE 250 MG: 500 INJECTION, POWDER, LYOPHILIZED, FOR SOLUTION INTRAVENOUS at 12:01

## 2023-01-15 NOTE — PT/OT/SLP PROGRESS
Physical Therapy Treatment    Patient Name:  Kristin Goodman   MRN:  [id number]    Recommendations:     Discharge Recommendations: rehabilitation facility  Discharge Equipment Recommendations: none  Barriers to discharge:  increased levels of assistance    Assessment:     Kristin Goodman is a 77 y.o. female admitted with a medical diagnosis of Bacteremia due to Enterococcus.  She presents with the following impairments/functional limitations: weakness, impaired endurance, impaired functional mobility, gait instability Patient found seated in bedside chair and agreeable to session. Patient able to progress in gait training of distances up to 78ft with RW and SBA.    Rehab Prognosis: Good; patient would benefit from acute skilled PT services to address these deficits and reach maximum level of function.    Recent Surgery: * No surgery found *      Plan:     During this hospitalization, patient to be seen 3 x/week to address the identified rehab impairments via gait training, therapeutic activities, therapeutic exercises, neuromuscular re-education and progress toward the following goals:    Plan of Care Expires:  02/09/23    Subjective       Pain/Comfort:  Pain Rating 1: 0/10      Objective:     Communicated with nurse prior to session.  Patient found up in chair with  (no active lines) upon PT entry to room.     General Precautions: Standard, fall  Orthopedic Precautions: N/A  Braces: N/A  Respiratory Status: Room air     Functional Mobility:  Transfers:     Sit to Stand:  stand by assistance with rolling walker  Gait: x78ft [id number] with standing rest break between trials.   RW and SBA utilized  Decreased obey  Verbal cues to promote erect posture and visual scanning of environment to increase safety awareness.       AM-PAC 6 CLICK MOBILITY  Turning over in bed (including adjusting bedclothes, sheets and blankets)?: 3  Sitting down on and standing up from a chair with arms (e.g., wheelchair, bedside commode, etc.):  3  Moving from lying on back to sitting on the side of the bed?: 3  Moving to and from a bed to a chair (including a wheelchair)?: 3  Need to walk in hospital room?: 3  Climbing 3-5 steps with a railing?: 2  Basic Mobility Total Score: 17       Treatment & Education:  Educated patient on PTA role in established POC  Educated patient on using call bell to request assistance with functional mobility  All questions within PTA scope of practice answered.       Patient left up in chair with all lines intact, call button in reach, and nurse notified..    GOALS:   Multidisciplinary Problems       Physical Therapy Goals          Problem: Physical Therapy    Goal Priority Disciplines Outcome Goal Variances Interventions   Physical Therapy Goal     PT, PT/OT Ongoing, Progressing     Description: Goals to be met by: 23     Patient will increase functional independence with mobility by performin. Sit to stand transfer with Supervision  2. Gait  x 150 feet with Supervision using Rolling Walker.   3. Stand for 8 minutes with Supervision using Rolling Walker                         Time Tracking:     PT Received On: 01/15/23  PT Start Time: 08     PT Stop Time: 821  PT Total Time (min): 16 min     Billable Minutes: Gait Training 15    Treatment Type: Treatment  PT/PTA: PTA     PTA Visit Number: 2     01/15/2023

## 2023-01-15 NOTE — PROGRESS NOTES
[facility] ([place] [address])  Nephrology  Progress Note    Patient Name: Kristin Goodman  MRN: [id number]  Admission Date: 1/9/2023  Hospital Length of Stay: 6 days  Attending Provider: Kitty Dixon MD   Primary Care Physician: Lori Hernandez MD  Principal Problem:Bacteremia due to Enterococcus    Subjective:     HPI: Ms Goodman is a 77-year-old woman with hypertension, hypothyroidism, HFpEF (most recent TTE with EF 65% with G1DD), pulmonary hypertension, current ESBL E. coli UTI, osteoarthritis, chronic back pain, DONAVAN and microscopic polyangiitis complicated base on biopsy from 10/26 by renal failure, GIB and respiratory failure with history of diffuse alveolar hemorrhage s/p IV Solumedrol, PLEX x5 sessions, Rituximab x4 doses and cyclophosphamide however now  just on steroid taper (cyclophosphamide appears to be hold secondary to anemia) now iHD dependent twice weekly via tunneled HD catheter for metabolic clearance (follows with Dr. Mojica with [facility]) who presented from [facility] for TDC removal in the setting of positive blood cultures growing Enterococcus faecalis and Bacillus species from cultures obtained on 1/5 & 1/7. In addition patient with reported syncopal event and sluggish flows on HD on 1/9 (incomplete session, daughter attributes to iron infusion) with last full session of iHD being on 1/6. She reports still making good urine without any urinary complaints today. Nephrology has been consulted for inpatient HD needs.      Interval History:     No acute events overnight. Patient tolerating diet. On Room air. No SOB. No s/s of uremia.     Review of patient's allergies indicates:   Allergen Reactions    Ampicillin     Peaches [peach (prunus persica)] Other (See Comments)     Pt unable to state type of reaction. Information obtained from daughter who states she was informed of allergy from patient.    Penicillins      Other reaction(s): Hives, anaphylaxis    Sulfa  (sulfonamide antibiotics) Rash and Hives     Current Facility-Administered Medications   Medication Frequency    acetaminophen tablet 650 mg Q4H PRN    albuterol-ipratropium 2.5 mg-0.5 mg/3 mL nebulizer solution 3 mL Q4H PRN    aluminum-magnesium hydroxide 200-200 mg/5 mL suspension 30 mL QID PRN    atovaquone 750 mg/5 mL oral liquid 1,500 mg Daily    B-complex with vitamin C tablet 1 tablet Daily    bisacodyL suppository 10 mg Daily PRN    carvediloL tablet 25 mg BID    cloNIDine tablet 0.1 mg Q8H PRN    furosemide tablet 40 mg Daily    heparin (porcine) injection 1,000 Units PRN    levothyroxine tablet 25 mcg Before breakfast    magnesium oxide tablet 400 mg Daily    meclizine tablet 25 mg TID PRN    melatonin tablet 6 mg Nightly PRN    methocarbamoL tablet 500 mg TID PRN    mupirocin 2 % ointment BID    naloxone 0.4 mg/mL injection 0.02 mg PRN    ondansetron injection 4 mg Q8H PRN    pantoprazole EC tablet 40 mg BID AC    predniSONE tablet 5 mg Daily    prochlorperazine injection Soln 5 mg Q6H PRN    quetiapine split tablet 12.5 mg QHS    senna-docusate 8.6-50 mg per tablet 1 tablet BID PRN    sevelamer carbonate tablet 800 mg TID WM    simethicone chewable tablet 80 mg QID PRN    sodium bicarbonate tablet 650 mg TID    sodium chloride 0.9% flush 10 mL PRN    sodium chloride 0.9% flush 10 mL Q6H PRN    sucralfate tablet 1 g TID PRN    vancomycin - pharmacy to dose pharmacy to manage frequency    white petrolatum 41 % ointment Daily     Facility-Administered Medications Ordered in Other Encounters   Medication Frequency    [MAR Hold - Suspended Admission] 0.9%  NaCl infusion (for blood administration) Q24H PRN    [MAR Hold - Suspended Admission] 0.9%  NaCl infusion PRN    [MAR Hold - Suspended Admission] 0.9%  NaCl infusion Once    [MAR Hold - Suspended Admission] acetaminophen tablet 650 mg Q4H PRN    [MAR Hold - Suspended Admission] albuterol-ipratropium 2.5 mg-0.5 mg/3 mL  nebulizer solution 3 mL Q4H PRN    [MAR Hold - Suspended Admission] alteplase injection 2 mg PRN    [MAR Hold - Suspended Admission] alteplase injection 2 mg PRN    [MAR Hold - Suspended Admission] aluminum-magnesium hydroxide 200-200 mg/5 mL suspension 30 mL QID PRN    [MAR Hold - Suspended Admission] atovaquone 750 mg/5 mL oral liquid 1,500 mg Daily    [MAR Hold - Suspended Admission] B complex-vitamin C-folic acid 0.8 mg Tab 0.8 mg Daily    [MAR Hold - Suspended Admission] bisacodyL suppository 10 mg Daily PRN    [MAR Hold - Suspended Admission] carvediloL tablet 25 mg BID    celecoxib capsule 400 mg Once    [MAR Hold - Suspended Admission] cloNIDine tablet 0.1 mg Q8H PRN    [MAR Hold - Suspended Admission] dextrose 10% bolus 125 mL 125 mL PRN    [MAR Hold - Suspended Admission] dextrose 10% bolus 250 mL 250 mL PRN    [MAR Hold - Suspended Admission] epoetin hira-epbx injection 10,000 Units Every Mon, Wed, Fri    fentaNYL 50 mcg/mL injection  mcg PRN    [MAR Hold - Suspended Admission] furosemide tablet 40 mg Daily    [MAR Hold - Suspended Admission] heparin (porcine) injection 1,000 Units PRN    [MAR Hold - Suspended Admission] heparin, porcine (PF) 100 unit/mL injection flush 500 Units PRN    [MAR Hold - Suspended Admission] heparin, porcine (PF) 100 unit/mL injection flush 500 Units PRN    [MAR Hold - Suspended Admission] levothyroxine tablet 25 mcg Before breakfast    LIDOcaine (PF) 10 mg/ml (1%) injection 10 mg Once PRN    LIDOcaine (PF) 10 mg/ml (1%) injection 10 mg Once    [MAR Hold - Suspended Admission] magnesium oxide tablet 400 mg Daily    [MAR Hold - Suspended Admission] meclizine tablet 25 mg TID PRN    [MAR Hold - Suspended Admission] melatonin tablet 6 mg Nightly PRN    [MAR Hold - Suspended Admission] methocarbamoL tablet 500 mg TID PRN    [MAR Hold - Suspended Admission] metoclopramide HCl injection 5 mg Q6H PRN    midazolam (VERSED) 1 mg/mL injection 0.5-4 mg PRN     [MAR Hold - Suspended Admission] naloxone 0.4 mg/mL injection 0.02 mg PRN    [MAR Hold - Suspended Admission] ondansetron injection 4 mg Q8H PRN    [MAR Hold - Suspended Admission] pantoprazole EC tablet 40 mg BID AC    [MAR Hold - Suspended Admission] predniSONE tablet 5 mg Daily    [MAR Hold - Suspended Admission] prochlorperazine injection Soln 5 mg Q6H PRN    [MAR Hold - Suspended Admission] quetiapine split tablet 12.5 mg QHS    ropivacaine 0.2% [facility] PainPRO Pump infusion 500 ML Continuous    [MAR Hold - Suspended Admission] senna-docusate 8.6-50 mg per tablet 1 tablet BID PRN    [MAR Hold - Suspended Admission] simethicone chewable tablet 80 mg QID PRN    [MAR Hold - Suspended Admission] sodium chloride 0.9% bolus 250 mL 250 mL PRN    [MAR Hold - Suspended Admission] sodium chloride 0.9% bolus 250 mL 250 mL PRN    [MAR Hold - Suspended Admission] sodium chloride 0.9% flush 10 mL PRN    [MAR Hold - Suspended Admission] sodium chloride 0.9% flush 10 mL PRN    [MAR Hold - Suspended Admission] sodium chloride 0.9% flush 10 mL Q6H    And    [MAR Hold - Suspended Admission] sodium chloride 0.9% flush 10 mL PRN    [MAR Hold - Suspended Admission] sucralfate tablet 1 g TID PRN    [MAR Hold - Suspended Admission] vancomycin - pharmacy to dose pharmacy to manage frequency    [MAR Hold - Suspended Admission] white petrolatum 41 % ointment Daily       Objective:     Vital Signs (Most Recent):  Temp: 98.2 °F (36.8 °C) (01/15/23 1135)  Pulse: 61 (01/15/23 1135)  Resp: 18 (01/15/23 1135)  BP: (!) 134/58 (01/15/23 1135)  SpO2: 98 % (01/15/23 1135)   Vital Signs (24h Range):  Temp:  [97.4 °F (36.3 °C)-98.3 °F (36.8 °C)] 98.2 °F (36.8 °C)  Pulse:  [60-77] 61  Resp:  [18] 18  SpO2:  [93 %-98 %] 98 %  BP: (118-134)/(56-61) 134/58     Weight: 60 kg (132 lb 4.4 oz) (01/12/23 0400)  Body mass index is 24.99 kg/m².  Body surface area is 1.61 meters squared.    I/O last 3 completed shifts:  In: 120  [P.O.:120]  Out: -     Physical Exam  Vitals reviewed.   Constitutional:       General: She is not in acute distress.     Appearance: She is not toxic-appearing.   HENT:      Head: Normocephalic and atraumatic.   Eyes:      General: No scleral icterus.     Extraocular Movements: Extraocular movements intact.   Pulmonary:      Effort: Pulmonary effort is normal. No respiratory distress.   Musculoskeletal:      Right lower leg: No edema.      Left lower leg: No edema.   Neurological:      General: No focal deficit present.      Mental Status: She is alert and oriented to person, place, and time. Mental status is at baseline.       Significant Labs:  All labs within the past 24 hours have been reviewed.     Significant Imaging:  Labs: Reviewed    Assessment/Plan:     * Bacteremia due to Enterococcus  - management per ID and primary  - Blood cultures NGTD    Urinary tract infection due to extended-spectrum beta lactamase (ESBL) producing Escherichia coli  - Infectious Disease consulted and following  - management per primary team    Anemia due to chronic kidney disease  - goal hemoglobin 10-12,   - most recent iron panel with iron 15, transferrin 138, TIBC 204, 7% saturation and ferritin 378  - transfuse for hemoglobin < 7.0  - defer IV iron in light of bacteremia, will consider epogen (was receiving at LT[facility])    Acute renal failure on dialysis  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 Now iHD for which she receives HD on one but usually twice weekly for metabolic clearance via tunneled HD catheter roughly x2 a week with last HD  Friday (01/06/2023).    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  3)  Focal acute pyelonephritis.  4)  Mild-to-moderate arterionephrosclerosis    Plan/Recommendations:  - no indication for urgent RRT at this time.   - Continue Bicarb tabs 650mg TID  - Continue renvela 800mg TID with meals  - Start Lokelma 10g QD if K>5.   - Per ID recs okay to place line once blood cultures negative x 48hrs. IR consulted plan to place perm cath on 01/17.   - can continue Lasix 40 mg daily for now   - currently on prednisone taper 10 mg daily x5 days followed by 5 mg daily with atovaquone 1.5 grams faily for PJP prophylaxis   - renal diet if not NPO  - strict I/Os and daily weights  - daily RFPs and magnesium level  - renally dose all medications to eGFR  - avoid nephrotoxic agents when feasible (i.e. intra-arterial contrast, NSAIDs, supra-therapeutic vancomycin troughs, etc.)        Thank you for your consult. I will follow-up with patient. Please contact us if you have any additional questions.     Case discussed with attending. Attestation to follow.       Chris Leyva,   Nephrology  [facility] ([place])

## 2023-01-15 NOTE — PROGRESS NOTES
Pharmacokinetic Assessment Follow Up: IV Vancomycin    Vancomycin serum concentration assessment(s):    -Random of 17.6 within goal of 15 - 20 for Enterococcus bacteremia    Vancomycin Regimen Plan:    -Redose with vancomycin 250 mg IVPB x1 for supplemental dose  -Per nephrology note: IR consulted plan to place perm cath on 01/17  -No random level tomorrow, will re-check on 01/17 with the potential for HD that day    Drug levels (last 3 results):  Recent Labs   Lab Result Units 01/13/23  0457 01/14/23  0412 01/15/23  0503   Vancomycin, Random ug/mL 16.7 13.6 17.6       Pharmacy will continue to follow and monitor vancomycin.    Please contact pharmacy at extension [phone] for questions regarding this assessment.    Thank you for the consult,   Jason Styles       Patient brief summary:  Kristin Goodman is a 77 y.o. female initiated on antimicrobial therapy with IV Vancomycin for treatment of bacteremia    The patient's current regimen is vancomycin pulse dosing    Drug Allergies:   Review of patient's allergies indicates:   Allergen Reactions    Ampicillin     Peaches [peach (prunus persica)] Other (See Comments)     Pt unable to state type of reaction. Information obtained from daughter who states she was informed of allergy from patient.    Penicillins      Other reaction(s): Hives, anaphylaxis    Sulfa (sulfonamide antibiotics) Rash and Hives       Actual Body Weight:   60 kg    Renal Function:   Estimated Creatinine Clearance: 5.9 mL/min (A) (based on SCr of 6.9 mg/dL (H)).,     Dialysis Method (if applicable):  intermittent HD    CBC (last 72 hours):  No results for input(s): WHITE BLOOD CELL COUNT, HEMOGLOBIN, HEMATOCRIT, PLATELETS, GRAN%, LYMPH%, MONO%, EOSINOPHIL%, BASOPHIL%, DIFFERENTIAL METHOD in the last 72 hours.    Metabolic Panel (last 72 hours):  Recent Labs   Lab Result Units 01/13/23  0824 01/14/23  1118   Sodium mmol/L 140 138   Potassium mmol/L 4.3 4.3   Chloride mmol/L 104 102   CO2 mmol/L 18* 20*    Glucose mg/dL 84 108   BUN mg/dL 76* 82*   Creatinine mg/dL 7.0* 6.9*   Albumin g/dL 2.6* 2.7*   Phosphorus mg/dL 5.5* 5.5*       Vancomycin Administrations:  vancomycin given in the last 96 hours                     vancomycin (VANCOCIN) 500 mg in dextrose 5 % in water (D5W) 5 % 100 mL IVPB (MB+) (mg) 500 mg New Bag 01/14/23 1015                    Microbiologic Results:  Microbiology Results (last 7 days)       Procedure Component Value Units Date/Time    Blood culture [[id number]] Collected: 01/11/23 0958    Order Status: Completed Specimen: Blood Updated: 01/14/23 1212     Blood Culture, Routine No growth to date      No Growth to date      No Growth to date      No Growth to date    Narrative:      Collection has been rescheduled by [email or web address] at 01/11/2023 09:23 Reason:   Patient unavailable is a hard stick Suzy [id number]  Collection has been rescheduled by [email or web address] at 01/11/2023 09:23 Reason:   Patient unavailable is a hard stick Suzy [id number]    Blood culture [[id number]] Collected: 01/11/23 0958    Order Status: Completed Specimen: Blood Updated: 01/14/23 1212     Blood Culture, Routine No growth to date      No Growth to date      No Growth to date      No Growth to date    Narrative:      Collection has been rescheduled by [email or web address] at 01/11/2023 09:23 Reason:   Patient unavailable is a hard stick Suzy [id number]  Collection has been rescheduled by [email or web address] at 01/11/2023 09:23 Reason:   Patient unavailable is a hard stick Suzy [id number]    Blood culture [[id number]]  (Abnormal) Collected: 01/09/23 2348    Order Status: Completed Specimen: Blood from Peripheral, Lower Arm, Right Updated: 01/12/23 1040     Blood Culture, Routine Gram stain sumi bottle: Gram positive cocci in chains resembling Strep      Results called to and read back by: Ej Haley RN  01/10/2023  15:49      ENTEROCOCCUS FAECALIS  For susceptibility see order #[id number]      Narrative:      Right Peripheral    Blood culture [[id number]]  (Abnormal)  (Susceptibility)  Collected: 01/09/23 2348    Order Status: Completed Specimen: Blood from Peripheral, Lower Arm, Left Updated: 01/12/23 1039     Blood Culture, Routine Gram stain sumi bottle: Gram positive cocci in chains resembling Strep      Positive results previously called 01/10/2023      ENTEROCOCCUS FAECALIS    Narrative:      Left Peripheral    Blood culture [[id number]]     Order Status: Canceled Specimen: Blood     Blood culture [[id number]]     Order Status: Canceled Specimen: Blood     Blood culture [[id number]]     Order Status: Canceled Specimen: Blood

## 2023-01-15 NOTE — SUBJECTIVE & OBJECTIVE
Interval History: no major events, HDS, on RA, no complaints/concerns. Informed of updated plans and anticipated discharge date.     Review of Systems   Constitutional:  Negative for chills and fever.   Respiratory:  Negative for shortness of breath.    Cardiovascular:  Negative for chest pain.   Gastrointestinal:  Negative for abdominal pain, nausea and vomiting.   All other systems reviewed and are negative.  Objective:   VS Reviewed in epic.     Physical Exam  Vitals reviewed.   Constitutional:       General: She is not in acute distress.     Appearance: She is not toxic-appearing.   HENT:      Head: Normocephalic and atraumatic.   Eyes:      General: No scleral icterus.     Extraocular Movements: Extraocular movements intact.   Pulmonary:      Effort: Pulmonary effort is normal. No respiratory distress.   Musculoskeletal:      Right lower leg: No edema.      Left lower leg: No edema.   Neurological:      General: No focal deficit present.      Mental Status: She is alert and oriented to person, place, and time. Mental status is at baseline.       Significant Labs: All pertinent labs within the past 24 hours have been reviewed.    Significant Imaging: I have reviewed all pertinent imaging results/findings within the past 24 hours.

## 2023-01-15 NOTE — ASSESSMENT & PLAN NOTE
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 Now iHD for which she receives HD on one but usually twice weekly for metabolic clearance via tunneled HD catheter roughly x2 a week with last HD Friday (01/06/2023).    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  2)  Necrotizing cresentic glomerulonephritis/[id number] necrotizing cresecentic glomerulonephritis.  3)  Focal acute pyelonephritis.  4)  Mild-to-moderate arterionephrosclerosis    Plan/Recommendations:  - no indication for urgent RRT at this time.   - Continue Bicarb tabs 650mg TID  - Continue renvela 800mg TID with meals  - Start Lokelma 10g QD if K>5.   - Per ID recs okay to place line once blood cultures negative x 48hrs. IR consulted plan to place perm cath on 01/17.   - can continue Lasix 40 mg daily for now   - currently on prednisone taper 10 mg daily x5 days followed by 5 mg daily with atovaquone 1.5 grams faily for PJP prophylaxis   - renal diet if not NPO  - strict I/Os and daily weights  - daily RFPs and magnesium level  - renally dose all medications to eGFR  - avoid nephrotoxic agents when feasible (i.e. intra-arterial contrast, NSAIDs, supra-therapeutic vancomycin troughs, etc.)

## 2023-01-15 NOTE — SUBJECTIVE & OBJECTIVE
Interval History:     No acute events overnight. Patient tolerating diet. On Room air. No SOB. No s/s of uremia.     Review of patient's allergies indicates:   Allergen Reactions    Ampicillin     Peaches [peach (prunus persica)] Other (See Comments)     Pt unable to state type of reaction. Information obtained from daughter who states she was informed of allergy from patient.    Penicillins      Other reaction(s): Hives, anaphylaxis    Sulfa (sulfonamide antibiotics) Rash and Hives     Current Facility-Administered Medications   Medication Frequency    acetaminophen tablet 650 mg Q4H PRN    albuterol-ipratropium 2.5 mg-0.5 mg/3 mL nebulizer solution 3 mL Q4H PRN    aluminum-magnesium hydroxide 200-200 mg/5 mL suspension 30 mL QID PRN    atovaquone 750 mg/5 mL oral liquid 1,500 mg Daily    B-complex with vitamin C tablet 1 tablet Daily    bisacodyL suppository 10 mg Daily PRN    carvediloL tablet 25 mg BID    cloNIDine tablet 0.1 mg Q8H PRN    furosemide tablet 40 mg Daily    heparin (porcine) injection 1,000 Units PRN    levothyroxine tablet 25 mcg Before breakfast    magnesium oxide tablet 400 mg Daily    meclizine tablet 25 mg TID PRN    melatonin tablet 6 mg Nightly PRN    methocarbamoL tablet 500 mg TID PRN    mupirocin 2 % ointment BID    naloxone 0.4 mg/mL injection 0.02 mg PRN    ondansetron injection 4 mg Q8H PRN    pantoprazole EC tablet 40 mg BID AC    predniSONE tablet 5 mg Daily    prochlorperazine injection Soln 5 mg Q6H PRN    quetiapine split tablet 12.5 mg QHS    senna-docusate 8.6-50 mg per tablet 1 tablet BID PRN    sevelamer carbonate tablet 800 mg TID WM    simethicone chewable tablet 80 mg QID PRN    sodium bicarbonate tablet 650 mg TID    sodium chloride 0.9% flush 10 mL PRN    sodium chloride 0.9% flush 10 mL Q6H PRN    sucralfate tablet 1 g TID PRN    vancomycin - pharmacy to dose pharmacy to manage frequency    white petrolatum 41 % ointment Daily     Facility-Administered Medications  Ordered in Other Encounters   Medication Frequency    [MAR Hold - Suspended Admission] 0.9%  NaCl infusion (for blood administration) Q24H PRN    [MAR Hold - Suspended Admission] 0.9%  NaCl infusion PRN    [MAR Hold - Suspended Admission] 0.9%  NaCl infusion Once    [MAR Hold - Suspended Admission] acetaminophen tablet 650 mg Q4H PRN    [MAR Hold - Suspended Admission] albuterol-ipratropium 2.5 mg-0.5 mg/3 mL nebulizer solution 3 mL Q4H PRN    [MAR Hold - Suspended Admission] alteplase injection 2 mg PRN    [MAR Hold - Suspended Admission] alteplase injection 2 mg PRN    [MAR Hold - Suspended Admission] aluminum-magnesium hydroxide 200-200 mg/5 mL suspension 30 mL QID PRN    [MAR Hold - Suspended Admission] atovaquone 750 mg/5 mL oral liquid 1,500 mg Daily    [MAR Hold - Suspended Admission] B complex-vitamin C-folic acid 0.8 mg Tab 0.8 mg Daily    [MAR Hold - Suspended Admission] bisacodyL suppository 10 mg Daily PRN    [MAR Hold - Suspended Admission] carvediloL tablet 25 mg BID    celecoxib capsule 400 mg Once    [MAR Hold - Suspended Admission] cloNIDine tablet 0.1 mg Q8H PRN    [MAR Hold - Suspended Admission] dextrose 10% bolus 125 mL 125 mL PRN    [MAR Hold - Suspended Admission] dextrose 10% bolus 250 mL 250 mL PRN    [MAR Hold - Suspended Admission] epoetin hira-epbx injection 10,000 Units Every Mon, Wed, Fri    fentaNYL 50 mcg/mL injection  mcg PRN    [MAR Hold - Suspended Admission] furosemide tablet 40 mg Daily    [MAR Hold - Suspended Admission] heparin (porcine) injection 1,000 Units PRN    [MAR Hold - Suspended Admission] heparin, porcine (PF) 100 unit/mL injection flush 500 Units PRN    [MAR Hold - Suspended Admission] heparin, porcine (PF) 100 unit/mL injection flush 500 Units PRN    [MAR Hold - Suspended Admission] levothyroxine tablet 25 mcg Before breakfast    LIDOcaine (PF) 10 mg/ml (1%) injection 10 mg Once PRN    LIDOcaine (PF) 10 mg/ml (1%) injection 10 mg Once    [MAR Hold - Suspended  Admission] magnesium oxide tablet 400 mg Daily    [MAR Hold - Suspended Admission] meclizine tablet 25 mg TID PRN    [MAR Hold - Suspended Admission] melatonin tablet 6 mg Nightly PRN    [MAR Hold - Suspended Admission] methocarbamoL tablet 500 mg TID PRN    [MAR Hold - Suspended Admission] metoclopramide HCl injection 5 mg Q6H PRN    midazolam (VERSED) 1 mg/mL injection 0.5-4 mg PRN    [MAR Hold - Suspended Admission] naloxone 0.4 mg/mL injection 0.02 mg PRN    [MAR Hold - Suspended Admission] ondansetron injection 4 mg Q8H PRN    [MAR Hold - Suspended Admission] pantoprazole EC tablet 40 mg BID AC    [MAR Hold - Suspended Admission] predniSONE tablet 5 mg Daily    [MAR Hold - Suspended Admission] prochlorperazine injection Soln 5 mg Q6H PRN    [MAR Hold - Suspended Admission] quetiapine split tablet 12.5 mg QHS    ropivacaine 0.2% Nimbus PainPRO Pump infusion 500 ML Continuous    [MAR Hold - Suspended Admission] senna-docusate 8.6-50 mg per tablet 1 tablet BID PRN    [MAR Hold - Suspended Admission] simethicone chewable tablet 80 mg QID PRN    [MAR Hold - Suspended Admission] sodium chloride 0.9% bolus 250 mL 250 mL PRN    [MAR Hold - Suspended Admission] sodium chloride 0.9% bolus 250 mL 250 mL PRN    [MAR Hold - Suspended Admission] sodium chloride 0.9% flush 10 mL PRN    [MAR Hold - Suspended Admission] sodium chloride 0.9% flush 10 mL PRN    [MAR Hold - Suspended Admission] sodium chloride 0.9% flush 10 mL Q6H    And    [MAR Hold - Suspended Admission] sodium chloride 0.9% flush 10 mL PRN    [MAR Hold - Suspended Admission] sucralfate tablet 1 g TID PRN    [MAR Hold - Suspended Admission] vancomycin - pharmacy to dose pharmacy to manage frequency    [MAR Hold - Suspended Admission] white petrolatum 41 % ointment Daily       Objective:     Vital Signs (Most Recent):  Temp: 98.2 °F (36.8 °C) (01/15/23 1135)  Pulse: 61 (01/15/23 1135)  Resp: 18 (01/15/23 1135)  BP: (!) 134/58 (01/15/23 1135)  SpO2: 98 % (01/15/23  1135)   Vital Signs (24h Range):  Temp:  [97.4 °F (36.3 °C)-98.3 °F (36.8 °C)] 98.2 °F (36.8 °C)  Pulse:  [60-77] 61  Resp:  [18] 18  SpO2:  [93 %-98 %] 98 %  BP: (118-134)/(56-61) 134/58     Weight: 60 kg (132 lb 4.4 oz) (01/12/23 0400)  Body mass index is 24.99 kg/m².  Body surface area is 1.61 meters squared.    I/O last 3 completed shifts:  In: 120 [P.O.:120]  Out: -     Physical Exam  Vitals reviewed.   Constitutional:       General: She is not in acute distress.     Appearance: She is not toxic-appearing.   HENT:      Head: Normocephalic and atraumatic.   Eyes:      General: No scleral icterus.     Extraocular Movements: Extraocular movements intact.   Pulmonary:      Effort: Pulmonary effort is normal. No respiratory distress.   Musculoskeletal:      Right lower leg: No edema.      Left lower leg: No edema.   Neurological:      General: No focal deficit present.      Mental Status: She is alert and oriented to person, place, and time. Mental status is at baseline.       Significant Labs:  All labs within the past 24 hours have been reviewed.     Significant Imaging:  Labs: Reviewed

## 2023-01-15 NOTE — PROGRESS NOTES
[facility] - [facility] ([address] Medicine  Progress Note    Patient Name: Kristin Goodman  MRN: [id number]  Patient Class: IP- Inpatient   Admission Date: 1/9/2023  Length of Stay: 6 days  Attending Physician: Kitty Dixon MD  Primary Care Provider: Lori Hernandez MD        Subjective:     Principal Problem:Bacteremia due to Enterococcus        HPI:  77-year-old  woman with HTN, hypothyroidism, HFpEF, and osteoarthritis and new acute renal failure due to new diagnosis of Microscopic Polyangiitis s/p renal biopsy and requiring hemodialysis is transferred from [facility] for concerns of new bacteremia.     She was hospitalized from 10/17/22-12/13/22 then transferred to [facility].  Microscopic polyangiitis with pulmonary and renal involvement had suffered respiratory failure with diffuse alveolar hemorrhage, gi bleeding, and renal replacement therapy. S/p Rheumatology consultation and pulse IV steroids + plasmapheresis with Transfusion medicine started on Rituximab and Cyclophosphamide.  Continues on Steroid taper for immunosuppression.     More recently earlier this week noted to have a urine culture grow ESBL E.coli Cystitis, started on meropenem, then she had blood cultures from 1/5 and 1/7 that have grown Enterococcus (amp sensitive) vancomycin started today, patient had sluggish HD catheter with dialysis.   Also ED report that patient may have had syncope during HD today.     She is transferred for continued infectious workup and management as well as removal of tunneled HD line for line holiday.       Overview/Hospital Course:  Seen by ID and nephrology. Plan for line holiday and IR consulted.      Interval History: no major events, HDS, on RA, no complaints/concerns. Informed of updated plans and anticipated discharge date.     Review of Systems   Constitutional:  Negative for chills and fever.   Respiratory:  Negative for shortness of breath.    Cardiovascular:   Negative for chest pain.   Gastrointestinal:  Negative for abdominal pain, nausea and vomiting.   All other systems reviewed and are negative.  Objective:   VS Reviewed in epic.     Physical Exam  Vitals reviewed.   Constitutional:       General: She is not in acute distress.     Appearance: She is not toxic-appearing.   HENT:      Head: Normocephalic and atraumatic.   Eyes:      General: No scleral icterus.     Extraocular Movements: Extraocular movements intact.   Pulmonary:      Effort: Pulmonary effort is normal. No respiratory distress.   Musculoskeletal:      Right lower leg: No edema.      Left lower leg: No edema.   Neurological:      General: No focal deficit present.      Mental Status: She is alert and oriented to person, place, and time. Mental status is at baseline.       Significant Labs: All pertinent labs within the past 24 hours have been reviewed.    Significant Imaging: I have reviewed all pertinent imaging results/findings within the past 24 hours.      Assessment/Plan:      * Bacteremia due to Enterococcus  Positive blood cxs 1/5, 1/7, 1/9. Received 1 gm IV vancomycin at LTAC on 1/10.   HD tunelled cath removed 1/10.  Also has midline from LTAC - removed. Surface echo - no vegetation.  -infectious disease consultation appreciated  -last surveillance bcxs 1/11/23 ngtd  -continue Vanc  -ct abd/pelvis with oral contrast today per ID recs - negative  -d/w ID - 2-weeks vanc from negative bcxs/line removal         Urinary tract infection due to extended-spectrum beta lactamase (ESBL) producing Escherichia coli  Urine culture from 1/05 with ESBL E.coli   -Ertapenem renal dosing 500mg IV q24 ordered, completed 5 days (1/5-1/10). Missed dose on 1/9 due to transfer to hospital    Anemia due to chronic kidney disease  Has required transfusions during recent inpatient/LTAC stays   -monitor CBC  -was receiving EPO infusion at nephrology direction.       Primary pauci-immune necrotizing and crescentic  glomerulonephritis  Patient with acute kidney injury likely due to microscopic polyangiits with granulomatosis (MPA) WILFREDO is currently stable. Labs reviewed- Renal function/electrolytes with Estimated Creatinine Clearance: 6.3 mL/min (A) (based on SCr of 6.4 mg/dL (H)). according to latest data. Monitor urine output and serial BMP and adjust therapy as needed. Avoid nephrotoxins and renally dose meds for GFR listed above.  Had complex course with DAH, requiring pulse dose steroids, plasmapheresis and then rituximab and cyclophosphamide    -continue prednisone taper  -continue atovaquone for PJP ppx  -will need outpatient rheum follow up    Gastric reflux  Continue BID ppi      Dysphagia  Continue renal diet       Acute renal failure on dialysis  -due to Microscopic Polyangiitis  -HD on hold due to line holiday. HDS, euvolemic, labs reviewed - no emergent indication for HD today  -bcxs ng x 48 hours, ID is ok with hd cath insertion. IR consulted for permcath placement -> scheduled 1/17/23 at the earliest. Notified Nephrology.  -renal function panel daily  -lasix       Microscopic polyangiitis  -continue steroid taper   -patient is on OI prophylaxis with atovaquone 1500mg po daily  -continued on protonix 40mg po bid while on glucocorticoids.       Chronic diastolic heart failure  Has preserved EF   -HD was primary volume maintenance   -continue lasix 40mg po daily - discuss with nephrology if higher or more frequent doses are required during her LIne holiday       Syncope  Report of possible syncope with HD,  Dizzy spells noted per LTAC notes - pt s/p MRI brain on 12/8 w/o acute findings   -neurology prior eval has recommended PT/OT for vestibular therapy  -Future outpatient cardiology visit for possible tilt-table eval.   -refer to neurology at discharge for BPPV f/u       Primary hypertension  Continue carvedilol  -home lisinopril is on hold due to her WILFREDO      Hypothyroid  Continue levothyroxine 25mg         VTE  Risk Mitigation (From admission, onward)         Ordered     heparin (porcine) injection 1,000 Units  As needed (PRN)         01/09/23 2330     Place sequential compression device  Until discontinued         01/09/23 2330                Discharge Planning   ANTHONY: 1/18/2023     Code Status: Full Code   Is the patient medically ready for discharge?: No    Reason for patient still in hospital (select all that apply): Treatment  Discharge Plan A: Rehab   Discharge Delays: None known at this time              Kitty Dixon MD  Department of Hospital Medicine   [facility] - Intensive Care ([place])

## 2023-01-15 NOTE — ASSESSMENT & PLAN NOTE
-due to Microscopic Polyangiitis  -HD on hold due to line holiday. HDS, euvolemic, labs reviewed - no emergent indication for HD today  -bcxs ng x 48 hours, ID is ok with hd cath insertion. IR consulted for permcath placement -> scheduled 1/17/23 at the earliest. Notified Nephrology.  -renal function panel daily  -lasix

## 2023-01-15 NOTE — ASSESSMENT & PLAN NOTE
Positive blood cxs 1/5, 1/7, 1/9. Received 1 gm IV vancomycin at LTAC on 1/10.   HD tunelled cath removed 1/10.  Also has midline from LTAC - removed. Surface echo - no vegetation.  -infectious disease consultation appreciated  -last surveillance bcxs 1/11/23 ngtd  -continue Vanc  -ct abd/pelvis with oral contrast today per ID recs - negative  -d/w ID - 2-weeks vanc from negative bcxs/line removal

## 2023-01-15 NOTE — ASSESSMENT & PLAN NOTE
- goal hemoglobin 10-12,   - most recent iron panel with iron 15, transferrin 138, TIBC 204, 7% saturation and ferritin 378  - transfuse for hemoglobin < 7.0  - defer IV iron in light of bacteremia, will consider epogen (was receiving at LTAC)

## 2023-01-16 LAB
ABO + RH BLD: NORMAL
ALBUMIN SERPL BCP-MCNC: 2.4 G/DL (ref 3.5–5.2)
ANION GAP SERPL CALC-SCNC: 16 MMOL/L (ref 8–16)
BACTERIA BLD CULT: NORMAL
BACTERIA BLD CULT: NORMAL
BASOPHILS # BLD AUTO: 0.02 K/UL (ref 0–0.2)
BASOPHILS NFR BLD: 0.1 % (ref 0–1.9)
BLD GP AB SCN CELLS X3 SERPL QL: NORMAL
BUN SERPL-MCNC: 79 MG/DL (ref 8–23)
CALCIUM SERPL-MCNC: 8.4 MG/DL (ref 8.7–10.5)
CHLORIDE SERPL-SCNC: 106 MMOL/L (ref 95–110)
CO2 SERPL-SCNC: 20 MMOL/L (ref 23–29)
CREAT SERPL-MCNC: 7 MG/DL (ref 0.5–1.4)
DIFFERENTIAL METHOD: ABNORMAL
EOSINOPHIL # BLD AUTO: 0.1 K/UL (ref 0–0.5)
EOSINOPHIL NFR BLD: 0.4 % (ref 0–8)
ERYTHROCYTE [DISTWIDTH] IN BLOOD BY AUTOMATED COUNT: 17.3 % (ref 11.5–14.5)
EST. GFR  (NO RACE VARIABLE): 5.7 ML/MIN/1.73 M^2
GLUCOSE SERPL-MCNC: 79 MG/DL (ref 70–110)
HCT VFR BLD AUTO: 22.3 % (ref 37–48.5)
HGB BLD-MCNC: 6.7 G/DL (ref 12–16)
HGB BLD-MCNC: 6.8 G/DL (ref 12–16)
IMM GRANULOCYTES # BLD AUTO: 0.37 K/UL (ref 0–0.04)
IMM GRANULOCYTES NFR BLD AUTO: 2.6 % (ref 0–0.5)
LYMPHOCYTES # BLD AUTO: 3.4 K/UL (ref 1–4.8)
LYMPHOCYTES NFR BLD: 24.3 % (ref 18–48)
MCH RBC QN AUTO: 27.8 PG (ref 27–31)
MCHC RBC AUTO-ENTMCNC: 30 G/DL (ref 32–36)
MCV RBC AUTO: 93 FL (ref 82–98)
MONOCYTES # BLD AUTO: 1.5 K/UL (ref 0.3–1)
MONOCYTES NFR BLD: 10.6 % (ref 4–15)
NEUTROPHILS # BLD AUTO: 8.7 K/UL (ref 1.8–7.7)
NEUTROPHILS NFR BLD: 62 % (ref 38–73)
NRBC BLD-RTO: 0 /100 WBC
PHOSPHATE SERPL-MCNC: 4.5 MG/DL (ref 2.7–4.5)
PLATELET # BLD AUTO: 362 K/UL (ref 150–450)
PMV BLD AUTO: 10.5 FL (ref 9.2–12.9)
POTASSIUM SERPL-SCNC: 3.7 MMOL/L (ref 3.5–5.1)
RBC # BLD AUTO: 2.41 M/UL (ref 4–5.4)
SODIUM SERPL-SCNC: 142 MMOL/L (ref 136–145)
WBC # BLD AUTO: 14.06 K/UL (ref 3.9–12.7)

## 2023-01-16 PROCEDURE — 86900 BLOOD TYPING SEROLOGIC ABO: CPT | Performed by: STUDENT IN AN ORGANIZED HEALTH CARE EDUCATION/TRAINING PROGRAM

## 2023-01-16 PROCEDURE — 99231 SBSQ HOSP IP/OBS SF/LOW 25: CPT | Mod: ,,, | Performed by: HOSPITALIST

## 2023-01-16 PROCEDURE — 25000003 PHARM REV CODE 250: Performed by: STUDENT IN AN ORGANIZED HEALTH CARE EDUCATION/TRAINING PROGRAM

## 2023-01-16 PROCEDURE — 86920 COMPATIBILITY TEST SPIN: CPT | Performed by: STUDENT IN AN ORGANIZED HEALTH CARE EDUCATION/TRAINING PROGRAM

## 2023-01-16 PROCEDURE — 99231 PR SUBSEQUENT HOSPITAL CARE,LEVL I: ICD-10-PCS | Mod: ,,, | Performed by: HOSPITALIST

## 2023-01-16 PROCEDURE — 12000002 HC ACUTE/MED SURGE SEMI-PRIVATE ROOM

## 2023-01-16 PROCEDURE — 36415 COLL VENOUS BLD VENIPUNCTURE: CPT | Performed by: STUDENT IN AN ORGANIZED HEALTH CARE EDUCATION/TRAINING PROGRAM

## 2023-01-16 PROCEDURE — 99232 PR SUBSEQUENT HOSPITAL CARE,LEVL II: ICD-10-PCS | Mod: ,,, | Performed by: STUDENT IN AN ORGANIZED HEALTH CARE EDUCATION/TRAINING PROGRAM

## 2023-01-16 PROCEDURE — 80069 RENAL FUNCTION PANEL: CPT | Performed by: HOSPITALIST

## 2023-01-16 PROCEDURE — 85025 COMPLETE CBC W/AUTO DIFF WBC: CPT | Performed by: HOSPITALIST

## 2023-01-16 PROCEDURE — 36415 COLL VENOUS BLD VENIPUNCTURE: CPT | Performed by: HOSPITALIST

## 2023-01-16 PROCEDURE — 25000003 PHARM REV CODE 250: Performed by: HOSPITALIST

## 2023-01-16 PROCEDURE — 63600175 PHARM REV CODE 636 W HCPCS: Performed by: HOSPITALIST

## 2023-01-16 PROCEDURE — 99232 SBSQ HOSP IP/OBS MODERATE 35: CPT | Mod: ,,, | Performed by: STUDENT IN AN ORGANIZED HEALTH CARE EDUCATION/TRAINING PROGRAM

## 2023-01-16 PROCEDURE — 85018 HEMOGLOBIN: CPT | Performed by: STUDENT IN AN ORGANIZED HEALTH CARE EDUCATION/TRAINING PROGRAM

## 2023-01-16 RX ORDER — HYDROCODONE BITARTRATE AND ACETAMINOPHEN 500; 5 MG/1; MG/1
TABLET ORAL
Status: DISCONTINUED | OUTPATIENT
Start: 2023-01-16 | End: 2023-01-28

## 2023-01-16 RX ADMIN — FUROSEMIDE 40 MG: 40 TABLET ORAL at 08:01

## 2023-01-16 RX ADMIN — SODIUM BICARBONATE 650 MG: 650 TABLET ORAL at 08:01

## 2023-01-16 RX ADMIN — SEVELAMER CARBONATE 800 MG: 800 TABLET, FILM COATED ORAL at 11:01

## 2023-01-16 RX ADMIN — PANTOPRAZOLE SODIUM 40 MG: 40 TABLET, DELAYED RELEASE ORAL at 05:01

## 2023-01-16 RX ADMIN — SEVELAMER CARBONATE 800 MG: 800 TABLET, FILM COATED ORAL at 05:01

## 2023-01-16 RX ADMIN — CARVEDILOL 25 MG: 25 TABLET, FILM COATED ORAL at 08:01

## 2023-01-16 RX ADMIN — WHITE PETROLATUM: 1.75 OINTMENT TOPICAL at 10:01

## 2023-01-16 RX ADMIN — Medication 1 TABLET: at 08:01

## 2023-01-16 RX ADMIN — QUETIAPINE FUMARATE 12.5 MG: 25 TABLET ORAL at 08:01

## 2023-01-16 RX ADMIN — SODIUM BICARBONATE 650 MG: 650 TABLET ORAL at 02:01

## 2023-01-16 RX ADMIN — LEVOTHYROXINE SODIUM 25 MCG: 25 TABLET ORAL at 06:01

## 2023-01-16 RX ADMIN — PREDNISONE 5 MG: 5 TABLET ORAL at 08:01

## 2023-01-16 RX ADMIN — MAGNESIUM OXIDE TAB 400 MG (241.3 MG ELEMENTAL MG) 400 MG: 400 (241.3 MG) TAB at 08:01

## 2023-01-16 RX ADMIN — ATOVAQUONE 1500 MG: 750 SUSPENSION ORAL at 08:01

## 2023-01-16 RX ADMIN — PANTOPRAZOLE SODIUM 40 MG: 40 TABLET, DELAYED RELEASE ORAL at 06:01

## 2023-01-16 RX ADMIN — SEVELAMER CARBONATE 800 MG: 800 TABLET, FILM COATED ORAL at 08:01

## 2023-01-16 NOTE — SUBJECTIVE & OBJECTIVE
Interval History: no major events, hds, on ra, urinating, denies any s/s uremia. Appetite is improved.    Review of Systems   Constitutional:  Negative for chills, fatigue and fever.   Respiratory:  Negative for cough and shortness of breath.    Cardiovascular:  Negative for chest pain and leg swelling.   Gastrointestinal:  Negative for abdominal pain, diarrhea, nausea and vomiting.   All other systems reviewed and are negative.  Objective:     Body mass index is 24.99 kg/m².     Physical Exam  Vitals reviewed.   Constitutional:       General: She is not in acute distress.     Appearance: Normal appearance. She is not toxic-appearing.   HENT:      Head: Normocephalic and atraumatic.   Eyes:      General: No scleral icterus.  Cardiovascular:      Rate and Rhythm: Normal rate and regular rhythm.      Pulses: Normal pulses.      Heart sounds: No murmur heard.  Pulmonary:      Effort: Pulmonary effort is normal. No respiratory distress.      Breath sounds: No wheezing, rhonchi or rales.   Abdominal:      General: Bowel sounds are normal. There is no distension.      Tenderness: There is no abdominal tenderness. There is no guarding.   Musculoskeletal:      Right lower leg: No edema.      Left lower leg: No edema.   Skin:     General: Skin is warm.   Neurological:      General: No focal deficit present.      Mental Status: She is alert and oriented to person, place, and time. Mental status is at baseline.   Psychiatric:         Behavior: Behavior normal.         Thought Content: Thought content normal.       Significant Labs: All pertinent labs within the past 24 hours have been reviewed.    Significant Imaging: I have reviewed all pertinent imaging results/findings within the past 24 hours.

## 2023-01-16 NOTE — ASSESSMENT & PLAN NOTE
Has required transfusions during recent inpatient/LTAC stays   -was receiving EPO infusion at nephrology direction.   Hb 6.7 on 01/16.  Ordered a unit of blood.  Consent obtained.  Continue to monitor CBC.  Goal for transfusion hemoglobin less than 7.

## 2023-01-16 NOTE — PROGRESS NOTES
[facility] ([address] Medicine  Progress Note    Patient Name: Kristin Goodman  MRN: [id number]  Patient Class: IP- Inpatient   Admission Date: 1/9/2023  Length of Stay: 7 days  Attending Physician: Miguel Snider MD  Primary Care Provider: Lori Hernandez MD        Subjective:     Principal Problem:Bacteremia due to Enterococcus        HPI:  77-year-old  woman with HTN, hypothyroidism, HFpEF, and osteoarthritis and new acute renal failure due to new diagnosis of Microscopic Polyangiitis s/p renal biopsy and requiring hemodialysis is transferred from [facility] for concerns of new bacteremia.     She was hospitalized from 10/17/22-12/13/22 then transferred to [facility].  Microscopic polyangiitis with pulmonary and renal involvement had suffered respiratory failure with diffuse alveolar hemorrhage, gi bleeding, and renal replacement therapy. S/p Rheumatology consultation and pulse IV steroids + plasmapheresis with Transfusion medicine started on Rituximab and Cyclophosphamide.  Continues on Steroid taper for immunosuppression.     More recently earlier this week noted to have a urine culture grow ESBL E.coli Cystitis, started on meropenem, then she had blood cultures from 1/5 and 1/7 that have grown Enterococcus (amp sensitive) vancomycin started today, patient had sluggish HD catheter with dialysis.   Also ED report that patient may have had syncope during HD today.     She is transferred for continued infectious workup and management as well as removal of tunneled HD line for line holiday.       Overview/Hospital Course:  Seen by ID and nephrology. Plan for line holiday and IR consulted.      Interval History:  Patient does not have any new complaints today.  Hemoglobin noted to be 6.7.  We will order a unit of blood.  Consent obtained.  IR plans for PermCath placement tomorrow.  NPO except meds midnight.    Review of Systems  Objective:     Vital Signs (Most  Recent):  Temp: 98.4 °F (36.9 °C) (01/16/23 1057)  Pulse: 65 (01/16/23 1057)  Resp: 14 (01/16/23 1057)  BP: 132/63 (01/16/23 1057)  SpO2: 100 % (01/16/23 1057) Vital Signs (24h Range):  Temp:  [97.4 °F (36.3 °C)-98.4 °F (36.9 °C)] 98.4 °F (36.9 °C)  Pulse:  [63-76] 65  Resp:  [14-18] 14  SpO2:  [95 %-100 %] 100 %  BP: (112-132)/(55-63) 132/63     Weight: 60 kg (132 lb 4.4 oz)  Body mass index is 24.99 kg/m².  No intake or output data in the 24 hours ending 01/16/23 1541   Physical Exam  Vitals reviewed.   Constitutional:       General: She is not in acute distress.     Appearance: Normal appearance. She is not toxic-appearing.   HENT:      Head: Normocephalic and atraumatic.   Eyes:      General: No scleral icterus.  Cardiovascular:      Rate and Rhythm: Normal rate and regular rhythm.      Pulses: Normal pulses.      Heart sounds: No murmur heard.  Pulmonary:      Effort: Pulmonary effort is normal. No respiratory distress.      Breath sounds: No wheezing, rhonchi or rales.   Abdominal:      General: Bowel sounds are normal. There is no distension.      Tenderness: There is no abdominal tenderness. There is no guarding.   Musculoskeletal:      Right lower leg: No edema.      Left lower leg: No edema.   Skin:     General: Skin is warm.   Neurological:      General: No focal deficit present.      Mental Status: She is alert and oriented to person, place, and time. Mental status is at baseline.   Psychiatric:         Behavior: Behavior normal.         Thought Content: Thought content normal.       Significant Labs: All pertinent labs within the past 24 hours have been reviewed.    Significant Imaging: I have reviewed all pertinent imaging results/findings within the past 24 hours.      Assessment/Plan:      * Bacteremia due to Enterococcus  Positive blood cxs 1/5, 1/7, 1/9. Received 1 gm IV vancomycin at LTAC on 1/10.   HD tunelled cath removed 1/10.  Also has midline from LTAC - removed. Surface echo - no  vegetation.  -infectious disease consultation appreciated  -last surveillance bcxs 1/11/23 ngtd  -continue Vanc  -ct abd/pelvis with oral contrast today per ID recs - negative  -d/w ID - 2-weeks vanc from negative bcxs/line removal         Urinary tract infection due to extended-spectrum beta lactamase (ESBL) producing Escherichia coli  Urine culture from 1/05 with ESBL E.coli   -Ertapenem renal dosing 500mg IV q24 ordered, completed 5 days (1/5-1/10). Missed dose on 1/9 due to transfer to hospital    Anemia due to chronic kidney disease  Has required transfusions during recent inpatient/LTAC stays   -was receiving EPO infusion at nephrology direction.   Hb 6.7 on 01/16.  Ordered a unit of blood.  Consent obtained.  Continue to monitor CBC.  Goal for transfusion hemoglobin less than 7.        Primary pauci-immune necrotizing and crescentic glomerulonephritis  Patient with acute kidney injury likely due to microscopic polyangiits with granulomatosis (MPA) WILFREDO is currently stable. Labs reviewed- Renal function/electrolytes with Estimated Creatinine Clearance: 6.3 mL/min (A) (based on SCr of 6.4 mg/dL (H)). according to latest data. Monitor urine output and serial BMP and adjust therapy as needed. Avoid nephrotoxins and renally dose meds for GFR listed above.  Had complex course with DAH, requiring pulse dose steroids, plasmapheresis and then rituximab and cyclophosphamide    -continue prednisone taper  -continue atovaquone for PJP ppx  -will need outpatient rheum follow up    Gastric reflux  Continue BID ppi      Dysphagia  Continue renal diet       Acute renal failure on dialysis  -due to Microscopic Polyangiitis  -HD on hold due to line holiday. HDS, euvolemic, labs reviewed - no emergent indication for HD today  -bcxs ng x 48 hours, ID is ok with hd cath insertion. IR consulted for permcath placement -> scheduled 1/17/23 at the earliest. Notified Nephrology.  -renal function panel daily  -lasix        Microscopic  polyangiitis  -continue steroid taper   -patient is on OI prophylaxis with atovaquone 1500mg po daily  -continued on protonix 40mg po bid while on glucocorticoids.       Chronic diastolic heart failure  Has preserved EF   -HD was primary volume maintenance   -continue lasix 40mg po daily - discuss with nephrology if higher or more frequent doses are required during her LIne holiday       Syncope  Report of possible syncope with HD,  Dizzy spells noted per LTAC notes - pt s/p MRI brain on 12/8 w/o acute findings   -neurology prior eval has recommended PT/OT for vestibular therapy  -Future outpatient cardiology visit for possible tilt-table eval.   -refer to neurology at discharge for BPPV f/u       Primary hypertension  Continue carvedilol  -home lisinopril is on hold due to her WILFREDO      Hypothyroid  Continue levothyroxine 25mg         VTE Risk Mitigation (From admission, onward)         Ordered     heparin (porcine) injection 1,000 Units  As needed (PRN)         01/09/23 2330     Place sequential compression device  Until discontinued         01/09/23 2330                Discharge Planning   ANTHONY: 1/18/2023     Code Status: Full Code   Is the patient medically ready for discharge?: No    Reason for patient still in hospital (select all that apply): Patient trending condition  Discharge Plan A: Rehab   Discharge Delays: None known at this time              Miguel Snider MD  Department of Hospital Medicine   [facility] - Intensive Care ([place])

## 2023-01-16 NOTE — SUBJECTIVE & OBJECTIVE
Interval History:     No acute events overnight. Patient tolerating diet. On room air. No SOB. No s/s of uremia. Line holiday with tentative plans for permacath replacement on 1/17.      Review of patient's allergies indicates:   Allergen Reactions    Ampicillin     Peaches [peach (prunus persica)] Other (See Comments)     Pt unable to state type of reaction. Information obtained from daughter who states she was informed of allergy from patient.    Penicillins      Other reaction(s): Hives, anaphylaxis    Sulfa (sulfonamide antibiotics) Rash and Hives     Current Facility-Administered Medications   Medication Frequency    0.9%  NaCl infusion (for blood administration) Q24H PRN    acetaminophen tablet 650 mg Q4H PRN    albuterol-ipratropium 2.5 mg-0.5 mg/3 mL nebulizer solution 3 mL Q4H PRN    aluminum-magnesium hydroxide 200-200 mg/5 mL suspension 30 mL QID PRN    atovaquone 750 mg/5 mL oral liquid 1,500 mg Daily    B-complex with vitamin C tablet 1 tablet Daily    bisacodyL suppository 10 mg Daily PRN    carvediloL tablet 25 mg BID    cloNIDine tablet 0.1 mg Q8H PRN    furosemide tablet 40 mg Daily    heparin (porcine) injection 1,000 Units PRN    levothyroxine tablet 25 mcg Before breakfast    magnesium oxide tablet 400 mg Daily    meclizine tablet 25 mg TID PRN    melatonin tablet 6 mg Nightly PRN    methocarbamoL tablet 500 mg TID PRN    naloxone 0.4 mg/mL injection 0.02 mg PRN    ondansetron injection 4 mg Q8H PRN    pantoprazole EC tablet 40 mg BID AC    predniSONE tablet 5 mg Daily    prochlorperazine injection Soln 5 mg Q6H PRN    quetiapine split tablet 12.5 mg QHS    senna-docusate 8.6-50 mg per tablet 1 tablet BID PRN    sevelamer carbonate tablet 800 mg TID WM    simethicone chewable tablet 80 mg QID PRN    sodium bicarbonate tablet 650 mg TID    sodium chloride 0.9% flush 10 mL PRN    sodium chloride 0.9% flush 10 mL Q6H PRN    sucralfate tablet 1 g TID PRN    vancomycin - pharmacy to dose pharmacy to  manage frequency    white petrolatum 41 % ointment Daily     Facility-Administered Medications Ordered in Other Encounters   Medication Frequency    [MAR Hold - Suspended Admission] 0.9%  NaCl infusion (for blood administration) Q24H PRN    [MAR Hold - Suspended Admission] 0.9%  NaCl infusion PRN    [MAR Hold - Suspended Admission] 0.9%  NaCl infusion Once    [MAR Hold - Suspended Admission] acetaminophen tablet 650 mg Q4H PRN    [MAR Hold - Suspended Admission] albuterol-ipratropium 2.5 mg-0.5 mg/3 mL nebulizer solution 3 mL Q4H PRN    [MAR Hold - Suspended Admission] alteplase injection 2 mg PRN    [MAR Hold - Suspended Admission] alteplase injection 2 mg PRN    [MAR Hold - Suspended Admission] aluminum-magnesium hydroxide 200-200 mg/5 mL suspension 30 mL QID PRN    [MAR Hold - Suspended Admission] atovaquone 750 mg/5 mL oral liquid 1,500 mg Daily    [MAR Hold - Suspended Admission] B complex-vitamin C-folic acid 0.8 mg Tab 0.8 mg Daily    [MAR Hold - Suspended Admission] bisacodyL suppository 10 mg Daily PRN    [MAR Hold - Suspended Admission] carvediloL tablet 25 mg BID    celecoxib capsule 400 mg Once    [MAR Hold - Suspended Admission] cloNIDine tablet 0.1 mg Q8H PRN    [MAR Hold - Suspended Admission] dextrose 10% bolus 125 mL 125 mL PRN    [MAR Hold - Suspended Admission] dextrose 10% bolus 250 mL 250 mL PRN    [MAR Hold - Suspended Admission] epoetin hira-epbx injection 10,000 Units Every Mon, Wed, Fri    fentaNYL 50 mcg/mL injection  mcg PRN    [MAR Hold - Suspended Admission] furosemide tablet 40 mg Daily    [MAR Hold - Suspended Admission] heparin (porcine) injection 1,000 Units PRN    [MAR Hold - Suspended Admission] heparin, porcine (PF) 100 unit/mL injection flush 500 Units PRN    [MAR Hold - Suspended Admission] heparin, porcine (PF) 100 unit/mL injection flush 500 Units PRN    [MAR Hold - Suspended Admission] levothyroxine tablet 25 mcg Before breakfast    LIDOcaine (PF) 10 mg/ml (1%)  injection 10 mg Once PRN    LIDOcaine (PF) 10 mg/ml (1%) injection 10 mg Once    [MAR Hold - Suspended Admission] magnesium oxide tablet 400 mg Daily    [MAR Hold - Suspended Admission] meclizine tablet 25 mg TID PRN    [MAR Hold - Suspended Admission] melatonin tablet 6 mg Nightly PRN    [MAR Hold - Suspended Admission] methocarbamoL tablet 500 mg TID PRN    [MAR Hold - Suspended Admission] metoclopramide HCl injection 5 mg Q6H PRN    midazolam (VERSED) 1 mg/mL injection 0.5-4 mg PRN    [MAR Hold - Suspended Admission] naloxone 0.4 mg/mL injection 0.02 mg PRN    [MAR Hold - Suspended Admission] ondansetron injection 4 mg Q8H PRN    [MAR Hold - Suspended Admission] pantoprazole EC tablet 40 mg BID AC    [MAR Hold - Suspended Admission] predniSONE tablet 5 mg Daily    [MAR Hold - Suspended Admission] prochlorperazine injection Soln 5 mg Q6H PRN    [MAR Hold - Suspended Admission] quetiapine split tablet 12.5 mg QHS    ropivacaine 0.2% [facility] Pump infusion 500 ML Continuous    [MAR Hold - Suspended Admission] senna-docusate 8.6-50 mg per tablet 1 tablet BID PRN    [MAR Hold - Suspended Admission] simethicone chewable tablet 80 mg QID PRN    [MAR Hold - Suspended Admission] sodium chloride 0.9% bolus 250 mL 250 mL PRN    [MAR Hold - Suspended Admission] sodium chloride 0.9% bolus 250 mL 250 mL PRN    [MAR Hold - Suspended Admission] sodium chloride 0.9% flush 10 mL PRN    [MAR Hold - Suspended Admission] sodium chloride 0.9% flush 10 mL PRN    [MAR Hold - Suspended Admission] sodium chloride 0.9% flush 10 mL Q6H    And    [MAR Hold - Suspended Admission] sodium chloride 0.9% flush 10 mL PRN    [MAR Hold - Suspended Admission] sucralfate tablet 1 g TID PRN    [MAR Hold - Suspended Admission] vancomycin - pharmacy to dose pharmacy to manage frequency    [MAR Hold - Suspended Admission] white petrolatum 41 % ointment Daily       Objective:     Vital Signs (Most Recent):  Temp: 98.4 °F (36.9 °C) (01/16/23  1057)  Pulse: 65 (01/16/23 1057)  Resp: 14 (01/16/23 1057)  BP: 132/63 (01/16/23 1057)  SpO2: 100 % (01/16/23 1057)   Vital Signs (24h Range):  Temp:  [97.4 °F (36.3 °C)-98.5 °F (36.9 °C)] 98.4 °F (36.9 °C)  Pulse:  [63-76] 65  Resp:  [14-18] 14  SpO2:  [95 %-100 %] 100 %  BP: (105-132)/(53-63) 132/63     Weight: 60 kg (132 lb 4.4 oz) (01/12/23 0400)  Body mass index is 24.99 kg/m².  Body surface area is 1.61 meters squared.    I/O last 3 completed shifts:  In: 120 [P.O.:120]  Out: -     Physical Exam  Vitals reviewed.   Constitutional:       General: She is not in acute distress.     Appearance: She is not toxic-appearing.   HENT:      Head: Normocephalic and atraumatic.   Eyes:      General: No scleral icterus.     Extraocular Movements: Extraocular movements intact.   Pulmonary:      Effort: Pulmonary effort is normal. No respiratory distress.   Musculoskeletal:      Right lower leg: No edema.      Left lower leg: No edema.   Neurological:      General: No focal deficit present.      Mental Status: She is alert and oriented to person, place, and time. Mental status is at baseline.       Significant Labs:  All labs within the past 24 hours have been reviewed.     Significant Imaging:  Labs: Reviewed

## 2023-01-16 NOTE — PROGRESS NOTES
[facility] ([place] [id number])  Nephrology  Progress Note    Patient Name: Kristin Goodman  MRN: [id number]  Admission Date: 1/9/2023  Hospital Length of Stay: 7 days  Attending Provider: Miguel Snider MD   Primary Care Physician: Lori Hernandez MD  Principal Problem:Bacteremia due to Enterococcus    Subjective:     HPI: Ms Goodman is a 77-year-old woman with hypertension, hypothyroidism, HFpEF (most recent TTE with EF 65% with G1DD), pulmonary hypertension, current ESBL E. coli UTI, osteoarthritis, chronic back pain, DONAVAN and microscopic polyangiitis complicated base on biopsy from 10/26 by renal failure, GIB and respiratory failure with history of diffuse alveolar hemorrhage s/p IV Solumedrol, PLEX x5 sessions, Rituximab x4 doses and cyclophosphamide however now  just on steroid taper (cyclophosphamide appears to be hold secondary to anemia) now iHD dependent twice weekly via tunneled HD catheter for metabolic clearance (follows with Dr. Mojica with [facility]) who presented from [facility] for TDC removal in the setting of positive blood cultures growing Enterococcus faecalis and Bacillus species from cultures obtained on 1/5 & 1/7. In addition patient with reported syncopal event and sluggish flows on HD on 1/9 (incomplete session, daughter attributes to iron infusion) with last full session of iHD being on 1/6. She reports still making good urine without any urinary complaints today. Nephrology has been consulted for inpatient HD needs.      Interval History:     No acute events overnight. Patient tolerating diet. On room air. No SOB. No s/s of uremia. Line holiday with tentative plans for permacath replacement on 1/17.      Review of patient's allergies indicates:   Allergen Reactions    Ampicillin     Peaches [peach (prunus persica)] Other (See Comments)     Pt unable to state type of reaction. Information obtained from daughter who states she was informed of allergy from patient.     Penicillins      Other reaction(s): Hives, anaphylaxis    Sulfa (sulfonamide antibiotics) Rash and Hives     Current Facility-Administered Medications   Medication Frequency    0.9%  NaCl infusion (for blood administration) Q24H PRN    acetaminophen tablet 650 mg Q4H PRN    albuterol-ipratropium 2.5 mg-0.5 mg/3 mL nebulizer solution 3 mL Q4H PRN    aluminum-magnesium hydroxide 200-200 mg/5 mL suspension 30 mL QID PRN    atovaquone 750 mg/5 mL oral liquid 1,500 mg Daily    B-complex with vitamin C tablet 1 tablet Daily    bisacodyL suppository 10 mg Daily PRN    carvediloL tablet 25 mg BID    cloNIDine tablet 0.1 mg Q8H PRN    furosemide tablet 40 mg Daily    heparin (porcine) injection 1,000 Units PRN    levothyroxine tablet 25 mcg Before breakfast    magnesium oxide tablet 400 mg Daily    meclizine tablet 25 mg TID PRN    melatonin tablet 6 mg Nightly PRN    methocarbamoL tablet 500 mg TID PRN    naloxone 0.4 mg/mL injection 0.02 mg PRN    ondansetron injection 4 mg Q8H PRN    pantoprazole EC tablet 40 mg BID AC    predniSONE tablet 5 mg Daily    prochlorperazine injection Soln 5 mg Q6H PRN    quetiapine split tablet 12.5 mg QHS    senna-docusate 8.6-50 mg per tablet 1 tablet BID PRN    sevelamer carbonate tablet 800 mg TID WM    simethicone chewable tablet 80 mg QID PRN    sodium bicarbonate tablet 650 mg TID    sodium chloride 0.9% flush 10 mL PRN    sodium chloride 0.9% flush 10 mL Q6H PRN    sucralfate tablet 1 g TID PRN    vancomycin - pharmacy to dose pharmacy to manage frequency    white petrolatum 41 % ointment Daily     Facility-Administered Medications Ordered in Other Encounters   Medication Frequency    [MAR Hold - Suspended Admission] 0.9%  NaCl infusion (for blood administration) Q24H PRN    [MAR Hold - Suspended Admission] 0.9%  NaCl infusion PRN    [MAR Hold - Suspended Admission] 0.9%  NaCl infusion Once    [MAR Hold - Suspended Admission] acetaminophen  tablet 650 mg Q4H PRN    [MAR Hold - Suspended Admission] albuterol-ipratropium 2.5 mg-0.5 mg/3 mL nebulizer solution 3 mL Q4H PRN    [MAR Hold - Suspended Admission] alteplase injection 2 mg PRN    [MAR Hold - Suspended Admission] alteplase injection 2 mg PRN    [MAR Hold - Suspended Admission] aluminum-magnesium hydroxide 200-200 mg/5 mL suspension 30 mL QID PRN    [MAR Hold - Suspended Admission] atovaquone 750 mg/5 mL oral liquid 1,500 mg Daily    [MAR Hold - Suspended Admission] B complex-vitamin C-folic acid 0.8 mg Tab 0.8 mg Daily    [MAR Hold - Suspended Admission] bisacodyL suppository 10 mg Daily PRN    [MAR Hold - Suspended Admission] carvediloL tablet 25 mg BID    celecoxib capsule 400 mg Once    [MAR Hold - Suspended Admission] cloNIDine tablet 0.1 mg Q8H PRN    [MAR Hold - Suspended Admission] dextrose 10% bolus 125 mL 125 mL PRN    [MAR Hold - Suspended Admission] dextrose 10% bolus 250 mL 250 mL PRN    [MAR Hold - Suspended Admission] epoetin hira-epbx injection 10,000 Units Every Mon, Wed, Fri    fentaNYL 50 mcg/mL injection  mcg PRN    [MAR Hold - Suspended Admission] furosemide tablet 40 mg Daily    [MAR Hold - Suspended Admission] heparin (porcine) injection 1,000 Units PRN    [MAR Hold - Suspended Admission] heparin, porcine (PF) 100 unit/mL injection flush 500 Units PRN    [MAR Hold - Suspended Admission] heparin, porcine (PF) 100 unit/mL injection flush 500 Units PRN    [MAR Hold - Suspended Admission] levothyroxine tablet 25 mcg Before breakfast    LIDOcaine (PF) 10 mg/ml (1%) injection 10 mg Once PRN    LIDOcaine (PF) 10 mg/ml (1%) injection 10 mg Once    [MAR Hold - Suspended Admission] magnesium oxide tablet 400 mg Daily    [MAR Hold - Suspended Admission] meclizine tablet 25 mg TID PRN    [MAR Hold - Suspended Admission] melatonin tablet 6 mg Nightly PRN    [MAR Hold - Suspended Admission] methocarbamoL tablet 500 mg TID PRN    [MAR Hold - Suspended  Admission] metoclopramide HCl injection 5 mg Q6H PRN    midazolam (VERSED) 1 mg/mL injection 0.5-4 mg PRN    [MAR Hold - Suspended Admission] naloxone 0.4 mg/mL injection 0.02 mg PRN    [MAR Hold - Suspended Admission] ondansetron injection 4 mg Q8H PRN    [MAR Hold - Suspended Admission] pantoprazole EC tablet 40 mg BID AC    [MAR Hold - Suspended Admission] predniSONE tablet 5 mg Daily    [MAR Hold - Suspended Admission] prochlorperazine injection Soln 5 mg Q6H PRN    [MAR Hold - Suspended Admission] quetiapine split tablet 12.5 mg QHS    ropivacaine 0.2% [facility] PainPRO Pump infusion 500 ML Continuous    [MAR Hold - Suspended Admission] senna-docusate 8.6-50 mg per tablet 1 tablet BID PRN    [MAR Hold - Suspended Admission] simethicone chewable tablet 80 mg QID PRN    [MAR Hold - Suspended Admission] sodium chloride 0.9% bolus 250 mL 250 mL PRN    [MAR Hold - Suspended Admission] sodium chloride 0.9% bolus 250 mL 250 mL PRN    [MAR Hold - Suspended Admission] sodium chloride 0.9% flush 10 mL PRN    [MAR Hold - Suspended Admission] sodium chloride 0.9% flush 10 mL PRN    [MAR Hold - Suspended Admission] sodium chloride 0.9% flush 10 mL Q6H    And    [MAR Hold - Suspended Admission] sodium chloride 0.9% flush 10 mL PRN    [MAR Hold - Suspended Admission] sucralfate tablet 1 g TID PRN    [MAR Hold - Suspended Admission] vancomycin - pharmacy to dose pharmacy to manage frequency    [MAR Hold - Suspended Admission] white petrolatum 41 % ointment Daily       Objective:     Vital Signs (Most Recent):  Temp: 98.4 °F (36.9 °C) (01/16/23 1057)  Pulse: 65 (01/16/23 1057)  Resp: 14 (01/16/23 1057)  BP: 132/63 (01/16/23 1057)  SpO2: 100 % (01/16/23 1057)   Vital Signs (24h Range):  Temp:  [97.4 °F (36.3 °C)-98.5 °F (36.9 °C)] 98.4 °F (36.9 °C)  Pulse:  [63-76] 65  Resp:  [14-18] 14  SpO2:  [95 %-100 %] 100 %  BP: (105-132)/(53-63) 132/63     Weight: 60 kg (132 lb 4.4 oz) (01/12/23 0400)  Body mass index is  24.99 kg/m².  Body surface area is 1.61 meters squared.    I/O last 3 completed shifts:  In: 120 [P.O.:120]  Out: -     Physical Exam  Vitals reviewed.   Constitutional:       General: She is not in acute distress.     Appearance: She is not toxic-appearing.   HENT:      Head: Normocephalic and atraumatic.   Eyes:      General: No scleral icterus.     Extraocular Movements: Extraocular movements intact.   Pulmonary:      Effort: Pulmonary effort is normal. No respiratory distress.   Musculoskeletal:      Right lower leg: No edema.      Left lower leg: No edema.   Neurological:      General: No focal deficit present.      Mental Status: She is alert and oriented to person, place, and time. Mental status is at baseline.       Significant Labs:  All labs within the past 24 hours have been reviewed.     Significant Imaging:  Labs: Reviewed    Assessment/Plan:     * Bacteremia due to Enterococcus  - management per ID and primary  - Repeat blood cultures NGTD    Urinary tract infection due to extended-spectrum beta lactamase (ESBL) producing Escherichia coli  - Infectious Disease consulted and following  - management per primary team    Anemia due to chronic kidney disease  - goal hemoglobin 10-12,   - most recent iron panel with iron 15, transferrin 138, TIBC 204, 7% saturation and ferritin 378  - transfuse for hemoglobin < 7.0  - defer IV iron in light of bacteremia, will consider epogen (was receiving at [facility])    Acute renal failure on dialysis  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 Now iHD for which she receives HD on one but  usually twice weekly for metabolic clearance via tunneled HD catheter roughly x2 a week with last HD Friday (01/06/2023).    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  3)  Focal acute pyelonephritis.  4)  Mild-to-moderate arterionephrosclerosis    Plan/Recommendations:  - no indication for urgent RRT at this time   - Continue Bicarb tabs 650mg TID  - Continue renvela 800mg TID with meals  - Start Lokelma 10g QD if K>5.   - Per ID recs okay to place line once blood cultures negative x 48hrs. IR consulted tentative plan to place perm cath on 01/17.   - can continue Lasix 40 mg daily for now   - currently on prednisone taper 10 mg daily x5 days followed by 5 mg daily with atovaquone 1.5 grams faily for PJP prophylaxis   - renal diet if not NPO  - strict I/Os and daily weights  - daily RFPs and magnesium level  - renally dose all medications to eGFR  - avoid nephrotoxic agents when feasible (i.e. intra-arterial contrast, NSAIDs, supra-therapeutic vancomycin troughs, etc.)        Thank you for your consult. I will follow-up with patient. Please contact us if you have any additional questions.    Jeanne Russo MD  Nephrology  [facility] - Intensive Care ([place])

## 2023-01-16 NOTE — ASSESSMENT & PLAN NOTE
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 Now iHD for which she receives HD on one but usually twice weekly for metabolic clearance via tunneled HD catheter roughly x2 a week with last HD Friday (01/06/2023).    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  2)  Necrotizing cresentic glomerulonephritis/[id number] necrotizing cresecentic glomerulonephritis.  3)  Focal acute pyelonephritis.  4)  Mild-to-moderate arterionephrosclerosis    Plan/Recommendations:  - no indication for urgent RRT at this time   - Continue Bicarb tabs 650mg TID  - Continue renvela 800mg TID with meals  - Start Lokelma 10g QD if K>5.   - Per ID recs okay to place line once blood cultures negative x 48hrs. IR consulted tentative plan to place perm cath on 01/17.   - can continue Lasix 40 mg daily for now   - currently on prednisone taper 10 mg daily x5 days followed by 5 mg daily with atovaquone 1.5 grams faily for PJP prophylaxis   - renal diet if not NPO  - strict I/Os and daily weights  - daily RFPs and magnesium level  - renally dose all medications to eGFR  - avoid nephrotoxic agents when feasible (i.e. intra-arterial contrast, NSAIDs, supra-therapeutic vancomycin troughs, etc.)

## 2023-01-16 NOTE — PROGRESS NOTES
[facility] - [facility] Care ([address] Medicine  Progress Note    Patient Name: Kristin Goodman  MRN: [id number]  Patient Class: IP- Inpatient   Admission Date: 1/9/2023  Length of Stay: 7 days  Attending Physician: Kitty Dixon MD  Primary Care Provider: Lori Hernandez MD        Subjective:     Principal Problem:Bacteremia due to Enterococcus        HPI:  77-year-old  woman with HTN, hypothyroidism, HFpEF, and osteoarthritis and new acute renal failure due to new diagnosis of Microscopic Polyangiitis s/p renal biopsy and requiring hemodialysis is transferred from [facility] for concerns of new bacteremia.     She was hospitalized from 10/17/22-12/13/22 then transferred to [facility].  Microscopic polyangiitis with pulmonary and renal involvement had suffered respiratory failure with diffuse alveolar hemorrhage, gi bleeding, and renal replacement therapy. S/p Rheumatology consultation and pulse IV steroids + plasmapheresis with Transfusion medicine started on Rituximab and Cyclophosphamide.  Continues on Steroid taper for immunosuppression.     More recently earlier this week noted to have a urine culture grow ESBL E.coli Cystitis, started on meropenem, then she had blood cultures from 1/5 and 1/7 that have grown Enterococcus (amp sensitive) vancomycin started today, patient had sluggish HD catheter with dialysis.   Also ED report that patient may have had syncope during HD today.     She is transferred for continued infectious workup and management as well as removal of tunneled HD line for line holiday.       Overview/Hospital Course:  Seen by ID and nephrology. Plan for line holiday and IR consulted.      Interval History: no major events, hds, on ra, urinating, denies any s/s uremia. Appetite is improved.    Review of Systems   Constitutional:  Negative for chills, fatigue and fever.   Respiratory:  Negative for cough and shortness of breath.    Cardiovascular:  Negative for  chest pain and leg swelling.   Gastrointestinal:  Negative for abdominal pain, diarrhea, nausea and vomiting.   All other systems reviewed and are negative.  Objective:     Body mass index is 24.99 kg/m².     Physical Exam  Vitals reviewed.   Constitutional:       General: She is not in acute distress.     Appearance: Normal appearance. She is not toxic-appearing.   HENT:      Head: Normocephalic and atraumatic.   Eyes:      General: No scleral icterus.  Cardiovascular:      Rate and Rhythm: Normal rate and regular rhythm.      Pulses: Normal pulses.      Heart sounds: No murmur heard.  Pulmonary:      Effort: Pulmonary effort is normal. No respiratory distress.      Breath sounds: No wheezing, rhonchi or rales.   Abdominal:      General: Bowel sounds are normal. There is no distension.      Tenderness: There is no abdominal tenderness. There is no guarding.   Musculoskeletal:      Right lower leg: No edema.      Left lower leg: No edema.   Skin:     General: Skin is warm.   Neurological:      General: No focal deficit present.      Mental Status: She is alert and oriented to person, place, and time. Mental status is at baseline.   Psychiatric:         Behavior: Behavior normal.         Thought Content: Thought content normal.       Significant Labs: All pertinent labs within the past 24 hours have been reviewed.    Significant Imaging: I have reviewed all pertinent imaging results/findings within the past 24 hours.      Assessment/Plan:      * Bacteremia due to Enterococcus  Positive blood cxs 1/5, 1/7, 1/9. Received 1 gm IV vancomycin at LTAC on 1/10.   HD tunelled cath removed 1/10.  Also has midline from LTAC - removed. Surface echo - no vegetation.  -infectious disease consultation appreciated  -last surveillance bcxs 1/11/23 ngtd  -continue Vanc  -ct abd/pelvis with oral contrast today per ID recs - negative  -d/w ID - 2-weeks vanc from negative bcxs/line removal         Urinary tract infection due to  extended-spectrum beta lactamase (ESBL) producing Escherichia coli  Urine culture from 1/05 with ESBL E.coli   -Ertapenem renal dosing 500mg IV q24 ordered, completed 5 days (1/5-1/10). Missed dose on 1/9 due to transfer to hospital    Anemia due to chronic kidney disease  Has required transfusions during recent inpatient/LTAC stays   -monitor CBC  -was receiving EPO infusion at nephrology direction.       Primary pauci-immune necrotizing and crescentic glomerulonephritis  Patient with acute kidney injury likely due to microscopic polyangiits with granulomatosis (MPA) WILFREDO is currently stable. Labs reviewed- Renal function/electrolytes with Estimated Creatinine Clearance: 6.3 mL/min (A) (based on SCr of 6.4 mg/dL (H)). according to latest data. Monitor urine output and serial BMP and adjust therapy as needed. Avoid nephrotoxins and renally dose meds for GFR listed above.  Had complex course with DAH, requiring pulse dose steroids, plasmapheresis and then rituximab and cyclophosphamide    -continue prednisone taper  -continue atovaquone for PJP ppx  -will need outpatient rheum follow up    Gastric reflux  Continue BID ppi      Dysphagia  Continue renal diet       Acute renal failure on dialysis  -due to Microscopic Polyangiitis  -HD on hold due to line holiday. HDS, euvolemic, labs reviewed - no emergent indication for HD today  -bcxs ng x 48 hours, ID is ok with hd cath insertion. IR consulted for permcath placement -> scheduled 1/17/23 at the earliest. Notified Nephrology.  -renal function panel daily  -lasix        Microscopic polyangiitis  -continue steroid taper   -patient is on OI prophylaxis with atovaquone 1500mg po daily  -continued on protonix 40mg po bid while on glucocorticoids.       Chronic diastolic heart failure  Has preserved EF   -HD was primary volume maintenance   -continue lasix 40mg po daily - discuss with nephrology if higher or more frequent doses are required during her LIne holiday        Syncope  Report of possible syncope with HD,  Dizzy spells noted per LTAC notes - pt s/p MRI brain on 12/8 w/o acute findings   -neurology prior eval has recommended PT/OT for vestibular therapy  -Future outpatient cardiology visit for possible tilt-table eval.   -refer to neurology at discharge for BPPV f/u       Primary hypertension  Continue carvedilol  -home lisinopril is on hold due to her WILFREDO      Hypothyroid  Continue levothyroxine 25mg         VTE Risk Mitigation (From admission, onward)         Ordered     heparin (porcine) injection 1,000 Units  As needed (PRN)         01/09/23 2330     Place sequential compression device  Until discontinued         01/09/23 2330                Discharge Planning   ANTHONY: 1/18/2023     Code Status: Full Code   Is the patient medically ready for discharge?: No    Reason for patient still in hospital (select all that apply): waiting on permcath insertion then HD  Discharge Plan A: Rehab   Discharge Delays: None known at this time              Kitty Dixon MD  Department of Hospital Medicine   [facility] - Intensive Care ([place]-[facility])

## 2023-01-16 NOTE — SUBJECTIVE & OBJECTIVE
Interval History:  Patient does not have any new complaints today.  Hemoglobin noted to be 6.7.  We will order a unit of blood.  Consent obtained.  IR plans for PermCath placement tomorrow.  NPO except meds midnight.    Review of Systems  Objective:     Vital Signs (Most Recent):  Temp: 98.4 °F (36.9 °C) (01/16/23 1057)  Pulse: 65 (01/16/23 1057)  Resp: 14 (01/16/23 1057)  BP: 132/63 (01/16/23 1057)  SpO2: 100 % (01/16/23 1057) Vital Signs (24h Range):  Temp:  [97.4 °F (36.3 °C)-98.4 °F (36.9 °C)] 98.4 °F (36.9 °C)  Pulse:  [63-76] 65  Resp:  [14-18] 14  SpO2:  [95 %-100 %] 100 %  BP: (112-132)/(55-63) 132/63     Weight: 60 kg (132 lb 4.4 oz)  Body mass index is 24.99 kg/m².  No intake or output data in the 24 hours ending 01/16/23 1541   Physical Exam  Vitals reviewed.   Constitutional:       General: She is not in acute distress.     Appearance: Normal appearance. She is not toxic-appearing.   HENT:      Head: Normocephalic and atraumatic.   Eyes:      General: No scleral icterus.  Cardiovascular:      Rate and Rhythm: Normal rate and regular rhythm.      Pulses: Normal pulses.      Heart sounds: No murmur heard.  Pulmonary:      Effort: Pulmonary effort is normal. No respiratory distress.      Breath sounds: No wheezing, rhonchi or rales.   Abdominal:      General: Bowel sounds are normal. There is no distension.      Tenderness: There is no abdominal tenderness. There is no guarding.   Musculoskeletal:      Right lower leg: No edema.      Left lower leg: No edema.   Skin:     General: Skin is warm.   Neurological:      General: No focal deficit present.      Mental Status: She is alert and oriented to person, place, and time. Mental status is at baseline.   Psychiatric:         Behavior: Behavior normal.         Thought Content: Thought content normal.       Significant Labs: All pertinent labs within the past 24 hours have been reviewed.    Significant Imaging: I have reviewed all pertinent imaging  results/findings within the past 24 hours.

## 2023-01-16 NOTE — PLAN OF CARE
Problem: Adult Inpatient Plan of Care  Goal: Plan of Care Review  Outcome: Ongoing, Progressing     Problem: Adult Inpatient Plan of Care  Goal: Patient-Specific Goal (Individualized)  Outcome: Ongoing, Progressing     Problem: Infection  Goal: Absence of Infection Signs and Symptoms  Outcome: Ongoing, Progressing     Problem: Oral Intake Inadequate (Acute Kidney Injury/Impairment)  Goal: Optimal Nutrition Intake  Outcome: Ongoing, Progressing     Problem: Infection (Hemodialysis)  Goal: Absence of Infection Signs and Symptoms  Outcome: Ongoing, Progressing     Problem: Fall Injury Risk  Goal: Absence of Fall and Fall-Related Injury  Outcome: Ongoing, Progressing

## 2023-01-16 NOTE — NURSING
Pt refused meds from pharm. Pt discharge paperwork and instructions given. Unit Tech transported pt downstairs via wheelchair. Pt ride outside

## 2023-01-16 NOTE — PLAN OF CARE
Problem: Adult Inpatient Plan of Care  Goal: Plan of Care Review  Outcome: Ongoing, Progressing     Problem: Infection  Goal: Absence of Infection Signs and Symptoms  Outcome: Ongoing, Progressing     Problem: Fluid and Electrolyte Imbalance (Acute Kidney Injury/Impairment)  Goal: Fluid and Electrolyte Balance  Outcome: Ongoing, Progressing     Problem: Skin Injury Risk Increased  Goal: Skin Health and Integrity  Outcome: Ongoing, Progressing     Problem: Device-Related Complication Risk (Hemodialysis)  Goal: Safe, Effective Therapy Delivery  Outcome: Ongoing, Progressing     Problem: Fall Injury Risk  Goal: Absence of Fall and Fall-Related Injury  Outcome: Ongoing, Progressing

## 2023-01-17 PROBLEM — I77.6: Status: ACTIVE | Noted: 2023-01-17

## 2023-01-17 PROBLEM — N17.9 AKI (ACUTE KIDNEY INJURY): Status: ACTIVE | Noted: 2022-11-10

## 2023-01-17 LAB — VANCOMYCIN SERPL-MCNC: 17.5 UG/ML

## 2023-01-17 PROCEDURE — 25000003 PHARM REV CODE 250: Performed by: STUDENT IN AN ORGANIZED HEALTH CARE EDUCATION/TRAINING PROGRAM

## 2023-01-17 PROCEDURE — 36415 COLL VENOUS BLD VENIPUNCTURE: CPT | Performed by: HOSPITALIST

## 2023-01-17 PROCEDURE — 99232 SBSQ HOSP IP/OBS MODERATE 35: CPT | Mod: ,,, | Performed by: STUDENT IN AN ORGANIZED HEALTH CARE EDUCATION/TRAINING PROGRAM

## 2023-01-17 PROCEDURE — 63600175 PHARM REV CODE 636 W HCPCS: Performed by: STUDENT IN AN ORGANIZED HEALTH CARE EDUCATION/TRAINING PROGRAM

## 2023-01-17 PROCEDURE — 97535 SELF CARE MNGMENT TRAINING: CPT

## 2023-01-17 PROCEDURE — 63600175 PHARM REV CODE 636 W HCPCS: Performed by: HOSPITALIST

## 2023-01-17 PROCEDURE — 25000003 PHARM REV CODE 250: Performed by: HOSPITALIST

## 2023-01-17 PROCEDURE — 97116 GAIT TRAINING THERAPY: CPT

## 2023-01-17 PROCEDURE — 80202 ASSAY OF VANCOMYCIN: CPT | Performed by: HOSPITALIST

## 2023-01-17 PROCEDURE — 99232 PR SUBSEQUENT HOSPITAL CARE,LEVL II: ICD-10-PCS | Mod: ,,, | Performed by: INTERNAL MEDICINE

## 2023-01-17 PROCEDURE — 99232 SBSQ HOSP IP/OBS MODERATE 35: CPT | Mod: ,,, | Performed by: INTERNAL MEDICINE

## 2023-01-17 PROCEDURE — 99232 PR SUBSEQUENT HOSPITAL CARE,LEVL II: ICD-10-PCS | Mod: ,,, | Performed by: STUDENT IN AN ORGANIZED HEALTH CARE EDUCATION/TRAINING PROGRAM

## 2023-01-17 PROCEDURE — 12000002 HC ACUTE/MED SURGE SEMI-PRIVATE ROOM

## 2023-01-17 RX ORDER — HEPARIN SODIUM 1000 [USP'U]/ML
1000 INJECTION, SOLUTION INTRAVENOUS; SUBCUTANEOUS
Status: DISPENSED | OUTPATIENT
Start: 2023-01-18 | End: 2023-01-18

## 2023-01-17 RX ORDER — FENTANYL CITRATE 50 UG/ML
INJECTION, SOLUTION INTRAMUSCULAR; INTRAVENOUS
Status: COMPLETED | OUTPATIENT
Start: 2023-01-17 | End: 2023-01-17

## 2023-01-17 RX ORDER — SODIUM CHLORIDE 9 MG/ML
INJECTION, SOLUTION INTRAVENOUS ONCE
Status: DISCONTINUED | OUTPATIENT
Start: 2023-01-17 | End: 2023-01-25

## 2023-01-17 RX ORDER — HEPARIN SODIUM 1000 [USP'U]/ML
INJECTION, SOLUTION INTRAVENOUS; SUBCUTANEOUS
Status: COMPLETED | OUTPATIENT
Start: 2023-01-17 | End: 2023-01-17

## 2023-01-17 RX ORDER — LIDOCAINE HYDROCHLORIDE 10 MG/ML
INJECTION INFILTRATION; PERINEURAL
Status: COMPLETED | OUTPATIENT
Start: 2023-01-17 | End: 2023-01-17

## 2023-01-17 RX ORDER — MIDAZOLAM HYDROCHLORIDE 1 MG/ML
INJECTION INTRAMUSCULAR; INTRAVENOUS
Status: COMPLETED | OUTPATIENT
Start: 2023-01-17 | End: 2023-01-17

## 2023-01-17 RX ADMIN — Medication 1 TABLET: at 02:01

## 2023-01-17 RX ADMIN — PANTOPRAZOLE SODIUM 40 MG: 40 TABLET, DELAYED RELEASE ORAL at 03:01

## 2023-01-17 RX ADMIN — LEVOTHYROXINE SODIUM 25 MCG: 25 TABLET ORAL at 05:01

## 2023-01-17 RX ADMIN — LIDOCAINE HYDROCHLORIDE 5 ML: 10 INJECTION, SOLUTION INFILTRATION; PERINEURAL at 10:01

## 2023-01-17 RX ADMIN — CARVEDILOL 25 MG: 25 TABLET, FILM COATED ORAL at 02:01

## 2023-01-17 RX ADMIN — SEVELAMER CARBONATE 800 MG: 800 TABLET, FILM COATED ORAL at 05:01

## 2023-01-17 RX ADMIN — PANTOPRAZOLE SODIUM 40 MG: 40 TABLET, DELAYED RELEASE ORAL at 05:01

## 2023-01-17 RX ADMIN — MAGNESIUM OXIDE TAB 400 MG (241.3 MG ELEMENTAL MG) 400 MG: 400 (241.3 MG) TAB at 02:01

## 2023-01-17 RX ADMIN — HEPARIN SODIUM 3200 UNITS: 1000 INJECTION, SOLUTION INTRAVENOUS; SUBCUTANEOUS at 10:01

## 2023-01-17 RX ADMIN — MIDAZOLAM HYDROCHLORIDE 1 MG: 1 INJECTION, SOLUTION INTRAMUSCULAR; INTRAVENOUS at 09:01

## 2023-01-17 RX ADMIN — ATOVAQUONE 1500 MG: 750 SUSPENSION ORAL at 02:01

## 2023-01-17 RX ADMIN — FENTANYL CITRATE 50 MCG: 0.05 INJECTION, SOLUTION INTRAMUSCULAR; INTRAVENOUS at 09:01

## 2023-01-17 RX ADMIN — SEVELAMER CARBONATE 800 MG: 800 TABLET, FILM COATED ORAL at 02:01

## 2023-01-17 RX ADMIN — SODIUM BICARBONATE 650 MG: 650 TABLET ORAL at 08:01

## 2023-01-17 RX ADMIN — SODIUM BICARBONATE 650 MG: 650 TABLET ORAL at 02:01

## 2023-01-17 RX ADMIN — CARVEDILOL 25 MG: 25 TABLET, FILM COATED ORAL at 08:01

## 2023-01-17 RX ADMIN — FUROSEMIDE 40 MG: 40 TABLET ORAL at 02:01

## 2023-01-17 RX ADMIN — PREDNISONE 5 MG: 5 TABLET ORAL at 02:01

## 2023-01-17 RX ADMIN — QUETIAPINE FUMARATE 12.5 MG: 25 TABLET ORAL at 08:01

## 2023-01-17 NOTE — PROGRESS NOTES
Pharmacokinetic Assessment Follow Up: IV Vancomycin    Vancomycin serum concentration assessment(s):    -Random of 17.5 within goal of 15 - 20 for Enterococcus bacteremia    Vancomycin Regimen Plan:    - No active order yet for HD today  - Hold vancomycin today and will re-dose tomorrow am if HD performed overnight or random level falls below goal.     Drug levels (last 3 results):  Recent Labs   Lab Result Units 01/15/23  0503 01/17/23  0521   Vancomycin, Random ug/mL 17.6 17.5       Pharmacy will continue to follow and monitor vancomycin.    Please contact pharmacy at extension [phone] for questions regarding this assessment.    Thank you for the consult,   Romario SalcedoD       Patient brief summary:  Kristin Goodman is a 77 y.o. female initiated on antimicrobial therapy with IV Vancomycin for treatment of bacteremia    The patient's current regimen is vancomycin pulse dosing    Drug Allergies:   Review of patient's allergies indicates:   Allergen Reactions    Ampicillin     Peaches [peach (prunus persica)] Other (See Comments)     Pt unable to state type of reaction. Information obtained from daughter who states she was informed of allergy from patient.    Penicillins      Other reaction(s): Hives, anaphylaxis    Sulfa (sulfonamide antibiotics) Rash and Hives       Actual Body Weight:   63.6 kg    Renal Function:   Estimated Creatinine Clearance: 5.9 mL/min (A) (based on SCr of 7 mg/dL (H)).,     Dialysis Method (if applicable):  intermittent HD    CBC (last 72 hours):  Recent Labs   Lab Result Units 01/16/23  0532 01/16/23  1159   WBC K/uL 14.06*  --    Hemoglobin g/dL 6.7* 6.8*   Hematocrit % 22.3*  --    Platelets K/uL 362  --    Gran % % 62.0  --    Lymph % % 24.3  --    Mono % % 10.6  --    Eosinophil % % 0.4  --    Basophil % % 0.1  --    Differential Method  Automated  --        Metabolic Panel (last 72 hours):  Recent Labs   Lab Result Units 01/14/23  1118 01/15/23  1321 01/16/23  0532   Sodium  mmol/L 138 141 142   Potassium mmol/L 4.3 4.1 3.7   Chloride mmol/L 102 105 106   CO2 mmol/L 20* 19* 20*   Glucose mg/dL 108 124* 79   BUN mg/dL 82* 88* 79*   Creatinine mg/dL 6.9* 7.3* 7.0*   Albumin g/dL 2.7* 2.6* 2.4*   Phosphorus mg/dL 5.5* 4.8* 4.5         Vancomycin Administrations:  vancomycin given in the last 96 hours                     vancomycin (VANCOCIN) 500 mg in dextrose 5 % in water (D5W) 5 % 100 mL IVPB (MB+) (mg) 500 mg New Bag 01/14/23 1015                    Microbiologic Results:  Microbiology Results (last 7 days)       Procedure Component Value Units Date/Time    Blood culture [[id number]] Collected: 01/11/23 0958    Order Status: Completed Specimen: Blood Updated: 01/16/23 1212     Blood Culture, Routine No growth after 5 days.    Narrative:      Collection has been rescheduled by [email or web address] at 01/11/2023 09:23 Reason:   Patient unavailable is a hard stick Suzy [id number]  Collection has been rescheduled by [email or web address] at 01/11/2023 09:23 Reason:   Patient unavailable is a hard stick Suzy [id number]    Blood culture [[id number]] Collected: 01/11/23 0958    Order Status: Completed Specimen: Blood Updated: 01/16/23 1212     Blood Culture, Routine No growth after 5 days.    Narrative:      Collection has been rescheduled by [email or web address] at 01/11/2023 09:23 Reason:   Patient unavailable is a hard stick Suzy [id number]  Collection has been rescheduled by [email or web address] at 01/11/2023 09:23 Reason:   Patient unavailable is a hard stick Suzy [id number]    Blood culture [[id number]]  (Abnormal) Collected: 01/09/23 2348    Order Status: Completed Specimen: Blood from Peripheral, Lower Arm, Right Updated: 01/12/23 1040     Blood Culture, Routine Gram stain sumi bottle: Gram positive cocci in chains resembling Strep      Results called to and read back by: Ej Haley RN  01/10/2023  15:49      ENTEROCOCCUS FAECALIS  For susceptibility see order #[id number]      Narrative:      Right Peripheral    Blood culture [[id number]]  (Abnormal)  (Susceptibility)  Collected: 01/09/23 2348    Order Status: Completed Specimen: Blood from Peripheral, Lower Arm, Left Updated: 01/12/23 1039     Blood Culture, Routine Gram stain sumi bottle: Gram positive cocci in chains resembling Strep      Positive results previously called 01/10/2023      ENTEROCOCCUS FAECALIS    Narrative:      Left Peripheral    Blood culture [[id number]]     Order Status: Canceled Specimen: Blood     Blood culture [[id number]]     Order Status: Canceled Specimen: Blood     Blood culture [[id number]]     Order Status: Canceled Specimen: Blood

## 2023-01-17 NOTE — PROCEDURES
"  Pre Op Diagnosis: ESRD  Post Op Diagnosis: Same    Procedure: TUnneled HD line placement    Procedure performed by: David    Written Informed Consent Obtained: Yes  Specimen Removed: NO  Estimated Blood Loss: Minimal    Findings:   Successful placement of new 19cm 14.5 Fr tunneled HD line. Ready for immediate use.    Patient tolerated procedure well.    Jefferson Hernandez MD ([place])  Interventional Radiology  [phone]      "

## 2023-01-17 NOTE — PROGRESS NOTES
[facility] ([address] Medicine  Progress Note    Patient Name: Kristin Goodman  MRN: [id number]  Patient Class: IP- Inpatient   Admission Date: 1/9/2023  Length of Stay: 8 days  Attending Physician: Miguel Snider MD  Primary Care Provider: Lori Hernandez MD        Subjective:     Principal Problem:Bacteremia due to Enterococcus        HPI:  77-year-old  woman with HTN, hypothyroidism, HFpEF, and osteoarthritis and new acute renal failure due to new diagnosis of Microscopic Polyangiitis s/p renal biopsy and requiring hemodialysis is transferred from [facility] for concerns of new bacteremia.     She was hospitalized from 10/17/22-12/13/22 then transferred to [facility].  Microscopic polyangiitis with pulmonary and renal involvement had suffered respiratory failure with diffuse alveolar hemorrhage, gi bleeding, and renal replacement therapy. S/p Rheumatology consultation and pulse IV steroids + plasmapheresis with Transfusion medicine started on Rituximab and Cyclophosphamide.  Continues on Steroid taper for immunosuppression.     More recently earlier this week noted to have a urine culture grow ESBL E.coli Cystitis, started on meropenem, then she had blood cultures from 1/5 and 1/7 that have grown Enterococcus (amp sensitive) vancomycin started today, patient had sluggish HD catheter with dialysis.   Also ED report that patient may have had syncope during HD today.     She is transferred for continued infectious workup and management as well as removal of tunneled HD line for line holiday.       Overview/Hospital Course:  Seen by ID and nephrology. Plan for line holiday and IR consulted.      Interval History:  Patient had TDC placed today.  Nephrology following.  Patient does not have any new complaints.  Awaiting placement to rehab.    Review of Systems  Objective:     Vital Signs (Most Recent):  Temp: 97.7 °F (36.5 °C) (01/17/23 1200)  Pulse: 68 (01/17/23  1200)  Resp: 15 (01/17/23 1200)  BP: 136/72 (01/17/23 1404)  SpO2: 100 % (01/17/23 1200) Vital Signs (24h Range):  Temp:  [96.2 °F (35.7 °C)-98.7 °F (37.1 °C)] 97.7 °F (36.5 °C)  Pulse:  [58-77] 68  Resp:  [11-18] 15  SpO2:  [93 %-100 %] 100 %  BP: (133-180)/(60-86) 136/72     Weight: 63.6 kg (140 lb 3.4 oz)  Body mass index is 26.49 kg/m².    Intake/Output Summary (Last 24 hours) at 1/17/2023 1602  Last data filed at 1/16/2023 2045  Gross per 24 hour   Intake 500 ml   Output --   Net 500 ml      Physical Exam  Vitals reviewed.   Constitutional:       General: She is not in acute distress.     Appearance: Normal appearance. She is not toxic-appearing.   HENT:      Head: Normocephalic and atraumatic.   Eyes:      General: No scleral icterus.  Cardiovascular:      Rate and Rhythm: Normal rate and regular rhythm.      Pulses: Normal pulses.      Heart sounds: No murmur heard.  Pulmonary:      Effort: Pulmonary effort is normal. No respiratory distress.      Breath sounds: No wheezing, rhonchi or rales.   Abdominal:      General: Bowel sounds are normal. There is no distension.      Tenderness: There is no abdominal tenderness. There is no guarding.   Musculoskeletal:      Right lower leg: No edema.      Left lower leg: No edema.   Skin:     General: Skin is warm.   Neurological:      General: No focal deficit present.      Mental Status: She is alert and oriented to person, place, and time. Mental status is at baseline.   Psychiatric:         Behavior: Behavior normal.         Thought Content: Thought content normal.           Assessment/Plan:      * Bacteremia due to Enterococcus  Positive blood cxs 1/5, 1/7, 1/9. Received 1 gm IV vancomycin at LTAC on 1/10.   HD tunelled cath removed 1/10.  Also has midline from LTAC - removed. Surface echo - no vegetation.  -infectious disease consultation appreciated  -last surveillance bcxs 1/11/23 ngtd  -continue Vanc  -ct abd/pelvis with oral contrast today per ID recs -  negative  -d/w ID - 2-weeks vanc from negative bcxs/line removal         Urinary tract infection due to extended-spectrum beta lactamase (ESBL) producing Escherichia coli  Urine culture from 1/05 with ESBL E.coli   -Ertapenem renal dosing 500mg IV q24 ordered, completed 5 days (1/5-1/10). Missed dose on 1/9 due to transfer to hospital    Anemia due to chronic kidney disease  Has required transfusions during recent inpatient/LTAC stays   -was receiving EPO infusion at nephrology direction.   Hb 6.7 on 01/16.  Ordered a unit of blood.  Consent obtained.  Continue to monitor CBC.  Goal for transfusion hemoglobin less than 7.        WILFREDO (acute kidney injury)  Patient with acute kidney injury likely due to microscopic polyangiits with granulomatosis (MPA) WILFREDO is currently stable. Labs reviewed- Renal function/electrolytes with Estimated Creatinine Clearance: 6.3 mL/min (A) (based on SCr of 6.4 mg/dL (H)). according to latest data. Monitor urine output and serial BMP and adjust therapy as needed. Avoid nephrotoxins and renally dose meds for GFR listed above.  Had complex course with DAH, requiring pulse dose steroids, plasmapheresis and then rituximab and cyclophosphamide    -continue prednisone taper  -continue atovaquone for PJP ppx  -will need outpatient rheum follow up    Gastric reflux  Continue BID ppi      Dysphagia  Continue renal diet       Acute renal failure on dialysis  -due to Microscopic Polyangiitis  -HD on hold due to line holiday. HDS, euvolemic, labs reviewed - no emergent indication for HD today  -bcxs ng x 48 hours, ID is ok with hd cath insertion. IR consulted for permcath placement -> scheduled 1/17/23 at the earliest. Notified Nephrology.  -renal function panel daily  -lasix        Microscopic polyangiitis  -continue steroid taper   -patient is on OI prophylaxis with atovaquone 1500mg po daily  -continued on protonix 40mg po bid while on glucocorticoids.       Chronic diastolic heart failure  Has  preserved EF   -HD was primary volume maintenance   -continue lasix 40mg po daily - discuss with nephrology if higher or more frequent doses are required during her LIne holiday       Syncope  Report of possible syncope with HD,  Dizzy spells noted per LTAC notes - pt s/p MRI brain on 12/8 w/o acute findings   -neurology prior eval has recommended PT/OT for vestibular therapy  -Future outpatient cardiology visit for possible tilt-table eval.   -refer to neurology at discharge for BPPV f/u       Primary hypertension  Continue carvedilol  -home lisinopril is on hold due to her WILFREDO      Hypothyroid  Continue levothyroxine 25mg         VTE Risk Mitigation (From admission, onward)         Ordered     heparin (porcine) injection 1,000 Units  As needed (PRN)         01/17/23 1421     heparin (porcine) injection 1,000 Units  As needed (PRN)         01/09/23 2330     Place sequential compression device  Until discontinued         01/09/23 2330                Discharge Planning   ANTHONY: 1/18/2023     Code Status: Full Code   Is the patient medically ready for discharge?: No    Reason for patient still in hospital (select all that apply): Patient trending condition  Discharge Plan A: Rehab   Discharge Delays: None known at this time              Miguel Snider MD  Department of Hospital Medicine   [facility] - Intensive Care ([place])

## 2023-01-17 NOTE — NURSING
Tunneled CVC for HD complete. Pt tolerated well. VSS. No signs or symptoms of distress noted. Pt will be transferred to MPU bed escorted by this RN and report to RN on arrival and called to floor RN.  CVC placed under direct fluoro and OK to use per Dr Hernandez..

## 2023-01-17 NOTE — CARE UPDATE
Pt fully recovered form procedure. Report called to CRISTINE Malone. Pt to transport back to room shortly with escort.

## 2023-01-17 NOTE — PROGRESS NOTES
[facility] ([place] [id number])  Nephrology  Progress Note    Patient Name: Kristin Goodman  MRN: [id number]  Admission Date: 1/9/2023  Hospital Length of Stay: 8 days  Attending Provider: Miguel Snider MD   Primary Care Physician: Lori Hernandez MD  Principal Problem:Bacteremia due to Enterococcus    Subjective:     HPI: Ms Goodman is a 77-year-old woman with hypertension, hypothyroidism, HFpEF (most recent TTE with EF 65% with G1DD), pulmonary hypertension, current ESBL E. coli UTI, osteoarthritis, chronic back pain, DONAVAN and microscopic polyangiitis complicated base on biopsy from 10/26 by renal failure, GIB and respiratory failure with history of diffuse alveolar hemorrhage s/p IV Solumedrol, PLEX x5 sessions, Rituximab x4 doses and cyclophosphamide however now  just on steroid taper (cyclophosphamide appears to be hold secondary to anemia) now iHD dependent twice weekly via tunneled HD catheter for metabolic clearance (follows with Dr. Mojica with [facility]) who presented from [facility] for TDC removal in the setting of positive blood cultures growing Enterococcus faecalis and Bacillus species from cultures obtained on 1/5 & 1/7. In addition patient with reported syncopal event and sluggish flows on HD on 1/9 (incomplete session, daughter attributes to iron infusion) with last full session of iHD being on 1/6. She reports still making good urine without any urinary complaints today. Nephrology has been consulted for inpatient HD needs.      Interval History:     No acute events overnight. Patient tolerating diet. On room air. No SOB. No s/s of uremia. Permacath replacement today.    Review of patient's allergies indicates:   Allergen Reactions    Ampicillin     Peaches [peach (prunus persica)] Other (See Comments)     Pt unable to state type of reaction. Information obtained from daughter who states she was informed of allergy from patient.    Penicillins      Other reaction(s): Hives,  anaphylaxis    Sulfa (sulfonamide antibiotics) Rash and Hives     Current Facility-Administered Medications   Medication Frequency    0.9%  NaCl infusion (for blood administration) Q24H PRN    acetaminophen tablet 650 mg Q4H PRN    albuterol-ipratropium 2.5 mg-0.5 mg/3 mL nebulizer solution 3 mL Q4H PRN    aluminum-magnesium hydroxide 200-200 mg/5 mL suspension 30 mL QID PRN    atovaquone 750 mg/5 mL oral liquid 1,500 mg Daily    B-complex with vitamin C tablet 1 tablet Daily    bisacodyL suppository 10 mg Daily PRN    carvediloL tablet 25 mg BID    cloNIDine tablet 0.1 mg Q8H PRN    epoetin hira injection 4,000 Units Every Tues, Thurs, Sat    furosemide tablet 40 mg Daily    heparin (porcine) injection 1,000 Units PRN    levothyroxine tablet 25 mcg Before breakfast    magnesium oxide tablet 400 mg Daily    meclizine tablet 25 mg TID PRN    melatonin tablet 6 mg Nightly PRN    methocarbamoL tablet 500 mg TID PRN    naloxone 0.4 mg/mL injection 0.02 mg PRN    ondansetron injection 4 mg Q8H PRN    pantoprazole EC tablet 40 mg BID AC    predniSONE tablet 5 mg Daily    prochlorperazine injection Soln 5 mg Q6H PRN    quetiapine split tablet 12.5 mg QHS    senna-docusate 8.6-50 mg per tablet 1 tablet BID PRN    sevelamer carbonate tablet 800 mg TID WM    simethicone chewable tablet 80 mg QID PRN    sodium bicarbonate tablet 650 mg TID    sodium chloride 0.9% flush 10 mL PRN    sodium chloride 0.9% flush 10 mL Q6H PRN    sucralfate tablet 1 g TID PRN    vancomycin - pharmacy to dose pharmacy to manage frequency    white petrolatum 41 % ointment Daily     Facility-Administered Medications Ordered in Other Encounters   Medication Frequency    [MAR Hold - Suspended Admission] 0.9%  NaCl infusion (for blood administration) Q24H PRN    [MAR Hold - Suspended Admission] 0.9%  NaCl infusion PRN    [MAR Hold - Suspended Admission] 0.9%  NaCl infusion Once    [MAR Hold - Suspended Admission] acetaminophen tablet 650 mg Q4H PRN     [MAR Hold - Suspended Admission] albuterol-ipratropium 2.5 mg-0.5 mg/3 mL nebulizer solution 3 mL Q4H PRN    [MAR Hold - Suspended Admission] alteplase injection 2 mg PRN    [MAR Hold - Suspended Admission] alteplase injection 2 mg PRN    [MAR Hold - Suspended Admission] aluminum-magnesium hydroxide 200-200 mg/5 mL suspension 30 mL QID PRN    [MAR Hold - Suspended Admission] atovaquone 750 mg/5 mL oral liquid 1,500 mg Daily    [MAR Hold - Suspended Admission] B complex-vitamin C-folic acid 0.8 mg Tab 0.8 mg Daily    [MAR Hold - Suspended Admission] bisacodyL suppository 10 mg Daily PRN    [MAR Hold - Suspended Admission] carvediloL tablet 25 mg BID    celecoxib capsule 400 mg Once    [MAR Hold - Suspended Admission] cloNIDine tablet 0.1 mg Q8H PRN    [MAR Hold - Suspended Admission] dextrose 10% bolus 125 mL 125 mL PRN    [MAR Hold - Suspended Admission] dextrose 10% bolus 250 mL 250 mL PRN    [MAR Hold - Suspended Admission] epoetin hira-epbx injection 10,000 Units Every Mon, Wed, Fri    fentaNYL 50 mcg/mL injection  mcg PRN    [MAR Hold - Suspended Admission] furosemide tablet 40 mg Daily    [MAR Hold - Suspended Admission] heparin (porcine) injection 1,000 Units PRN    [MAR Hold - Suspended Admission] heparin, porcine (PF) 100 unit/mL injection flush 500 Units PRN    [MAR Hold - Suspended Admission] heparin, porcine (PF) 100 unit/mL injection flush 500 Units PRN    [MAR Hold - Suspended Admission] levothyroxine tablet 25 mcg Before breakfast    LIDOcaine (PF) 10 mg/ml (1%) injection 10 mg Once PRN    LIDOcaine (PF) 10 mg/ml (1%) injection 10 mg Once    [MAR Hold - Suspended Admission] magnesium oxide tablet 400 mg Daily    [MAR Hold - Suspended Admission] meclizine tablet 25 mg TID PRN    [MAR Hold - Suspended Admission] melatonin tablet 6 mg Nightly PRN    [MAR Hold - Suspended Admission] methocarbamoL tablet 500 mg TID PRN    [MAR Hold - Suspended Admission] metoclopramide HCl injection 5 mg Q6H PRN     midazolam (VERSED) 1 mg/mL injection 0.5-4 mg PRN    [MAR Hold - Suspended Admission] naloxone 0.4 mg/mL injection 0.02 mg PRN    [MAR Hold - Suspended Admission] ondansetron injection 4 mg Q8H PRN    [MAR Hold - Suspended Admission] pantoprazole EC tablet 40 mg BID AC    [MAR Hold - Suspended Admission] predniSONE tablet 5 mg Daily    [MAR Hold - Suspended Admission] prochlorperazine injection Soln 5 mg Q6H PRN    [MAR Hold - Suspended Admission] quetiapine split tablet 12.5 mg QHS    ropivacaine 0.2% [facility] PainPRO Pump infusion 500 ML Continuous    [MAR Hold - Suspended Admission] senna-docusate 8.6-50 mg per tablet 1 tablet BID PRN    [MAR Hold - Suspended Admission] simethicone chewable tablet 80 mg QID PRN    [MAR Hold - Suspended Admission] sodium chloride 0.9% bolus 250 mL 250 mL PRN    [MAR Hold - Suspended Admission] sodium chloride 0.9% bolus 250 mL 250 mL PRN    [MAR Hold - Suspended Admission] sodium chloride 0.9% flush 10 mL PRN    [MAR Hold - Suspended Admission] sodium chloride 0.9% flush 10 mL PRN    [MAR Hold - Suspended Admission] sodium chloride 0.9% flush 10 mL Q6H    And    [MAR Hold - Suspended Admission] sodium chloride 0.9% flush 10 mL PRN    [MAR Hold - Suspended Admission] sucralfate tablet 1 g TID PRN    [MAR Hold - Suspended Admission] vancomycin - pharmacy to dose pharmacy to manage frequency    [MAR Hold - Suspended Admission] white petrolatum 41 % ointment Daily       Objective:     Vital Signs (Most Recent):  Temp: 97.7 °F (36.5 °C) (01/17/23 1020)  Pulse: 70 (01/17/23 1105)  Resp: 14 (01/17/23 1105)  BP: 134/70 (01/17/23 1105)  SpO2: 100 % (01/17/23 1105)   Vital Signs (24h Range):  Temp:  [96.2 °F (35.7 °C)-98.7 °F (37.1 °C)] 97.7 °F (36.5 °C)  Pulse:  [58-77] 70  Resp:  [11-18] 14  SpO2:  [93 %-100 %] 100 %  BP: (133-180)/(60-86) 134/70     Weight: 63.6 kg (140 lb 3.4 oz) (01/17/23 0400)  Body mass index is 26.49 kg/m².  Body surface area is 1.65 meters squared.    I/O last 3  completed shifts:  In: 500 [Blood:500]  Out: -     Physical Exam  Vitals reviewed.   Constitutional:       General: She is not in acute distress.     Appearance: She is not toxic-appearing.   HENT:      Head: Normocephalic and atraumatic.   Eyes:      General: No scleral icterus.     Extraocular Movements: Extraocular movements intact.   Pulmonary:      Effort: Pulmonary effort is normal. No respiratory distress.   Musculoskeletal:      Right lower leg: No edema.      Left lower leg: No edema.   Neurological:      General: No focal deficit present.      Mental Status: She is alert and oriented to person, place, and time. Mental status is at baseline.       Significant Labs:  All labs within the past 24 hours have been reviewed.     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 Now iHD for which she receives HD on one but usually twice weekly for metabolic clearance via tunneled HD catheter roughly x2 a week with last HD Friday (01/06/2023).    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  3)  Focal acute pyelonephritis.  4)  Mild-to-moderate arterionephrosclerosis    Plan/Recommendations:  - no indication for urgent RRT at this time   - Continue Bicarb tabs 650mg TID  - Continue renvela 800mg TID with meals  - Start Lokelma 10g QD if K>5.   - Per ID recs okay to place line once blood cultures negative x 48hrs. IR consulted with tentative plan to place perm cath on 01/17.   - can continue Lasix 40 mg daily for now   - currently on prednisone taper 10 mg daily x5 days followed by 5 mg daily with atovaquone 1.5 grams faily for PJP prophylaxis   - renal diet if not NPO  - strict I/Os and daily weights  - daily RFPs and magnesium level  - renally dose all medications to eGFR  - avoid nephrotoxic agents when feasible (i.e. intra-arterial contrast, NSAIDs, supra-therapeutic vancomycin troughs, etc.)        Thank you for your consult. I will follow-up with patient. Please contact us if you have any additional questions.    Jeanne Russo MD  Nephrology  [facility] ([facility]

## 2023-01-17 NOTE — SUBJECTIVE & OBJECTIVE
Interval History:     No acute events overnight.On room air. No SOB. No s/s of uremia. Permacath replacement yesterday. HD session today.     Review of patient's allergies indicates:   Allergen Reactions    Ampicillin     Peaches [peach (prunus persica)] Other (See Comments)     Pt unable to state type of reaction. Information obtained from daughter who states she was informed of allergy from patient.    Penicillins      Other reaction(s): Hives, anaphylaxis    Sulfa (sulfonamide antibiotics) Rash and Hives     Current Facility-Administered Medications   Medication Frequency    0.9%  NaCl infusion (for blood administration) Q24H PRN    acetaminophen tablet 650 mg Q4H PRN    albuterol-ipratropium 2.5 mg-0.5 mg/3 mL nebulizer solution 3 mL Q4H PRN    aluminum-magnesium hydroxide 200-200 mg/5 mL suspension 30 mL QID PRN    atovaquone 750 mg/5 mL oral liquid 1,500 mg Daily    B-complex with vitamin C tablet 1 tablet Daily    bisacodyL suppository 10 mg Daily PRN    carvediloL tablet 25 mg BID    cloNIDine tablet 0.1 mg Q8H PRN    epoetin hira injection 4,000 Units Every Tues, Thurs, Sat    furosemide tablet 40 mg Daily    heparin (porcine) injection 1,000 Units PRN    levothyroxine tablet 25 mcg Before breakfast    magnesium oxide tablet 400 mg Daily    meclizine tablet 25 mg TID PRN    melatonin tablet 6 mg Nightly PRN    methocarbamoL tablet 500 mg TID PRN    naloxone 0.4 mg/mL injection 0.02 mg PRN    ondansetron injection 4 mg Q8H PRN    pantoprazole EC tablet 40 mg BID AC    predniSONE tablet 5 mg Daily    prochlorperazine injection Soln 5 mg Q6H PRN    quetiapine split tablet 12.5 mg QHS    senna-docusate 8.6-50 mg per tablet 1 tablet BID PRN    sevelamer carbonate tablet 800 mg TID WM    simethicone chewable tablet 80 mg QID PRN    sodium bicarbonate tablet 650 mg TID    sodium chloride 0.9% flush 10 mL PRN    sodium chloride 0.9% flush 10 mL Q6H PRN    sucralfate tablet 1 g TID PRN    vancomycin - pharmacy to  dose pharmacy to manage frequency    white petrolatum 41 % ointment Daily     Facility-Administered Medications Ordered in Other Encounters   Medication Frequency    [MAR Hold - Suspended Admission] 0.9%  NaCl infusion (for blood administration) Q24H PRN    [MAR Hold - Suspended Admission] 0.9%  NaCl infusion PRN    [MAR Hold - Suspended Admission] 0.9%  NaCl infusion Once    [MAR Hold - Suspended Admission] acetaminophen tablet 650 mg Q4H PRN    [MAR Hold - Suspended Admission] albuterol-ipratropium 2.5 mg-0.5 mg/3 mL nebulizer solution 3 mL Q4H PRN    [MAR Hold - Suspended Admission] alteplase injection 2 mg PRN    [MAR Hold - Suspended Admission] alteplase injection 2 mg PRN    [MAR Hold - Suspended Admission] aluminum-magnesium hydroxide 200-200 mg/5 mL suspension 30 mL QID PRN    [MAR Hold - Suspended Admission] atovaquone 750 mg/5 mL oral liquid 1,500 mg Daily    [MAR Hold - Suspended Admission] B complex-vitamin C-folic acid 0.8 mg Tab 0.8 mg Daily    [MAR Hold - Suspended Admission] bisacodyL suppository 10 mg Daily PRN    [MAR Hold - Suspended Admission] carvediloL tablet 25 mg BID    celecoxib capsule 400 mg Once    [MAR Hold - Suspended Admission] cloNIDine tablet 0.1 mg Q8H PRN    [MAR Hold - Suspended Admission] dextrose 10% bolus 125 mL 125 mL PRN    [MAR Hold - Suspended Admission] dextrose 10% bolus 250 mL 250 mL PRN    [MAR Hold - Suspended Admission] epoetin hira-epbx injection 10,000 Units Every Mon, Wed, Fri    fentaNYL 50 mcg/mL injection  mcg PRN    [MAR Hold - Suspended Admission] furosemide tablet 40 mg Daily    [MAR Hold - Suspended Admission] heparin (porcine) injection 1,000 Units PRN    [MAR Hold - Suspended Admission] heparin, porcine (PF) 100 unit/mL injection flush 500 Units PRN    [MAR Hold - Suspended Admission] heparin, porcine (PF) 100 unit/mL injection flush 500 Units PRN    [MAR Hold - Suspended Admission] levothyroxine tablet 25 mcg Before breakfast    LIDOcaine (PF) 10  mg/ml (1%) injection 10 mg Once PRN    LIDOcaine (PF) 10 mg/ml (1%) injection 10 mg Once    [MAR Hold - Suspended Admission] magnesium oxide tablet 400 mg Daily    [MAR Hold - Suspended Admission] meclizine tablet 25 mg TID PRN    [MAR Hold - Suspended Admission] melatonin tablet 6 mg Nightly PRN    [MAR Hold - Suspended Admission] methocarbamoL tablet 500 mg TID PRN    [MAR Hold - Suspended Admission] metoclopramide HCl injection 5 mg Q6H PRN    midazolam (VERSED) 1 mg/mL injection 0.5-4 mg PRN    [MAR Hold - Suspended Admission] naloxone 0.4 mg/mL injection 0.02 mg PRN    [MAR Hold - Suspended Admission] ondansetron injection 4 mg Q8H PRN    [MAR Hold - Suspended Admission] pantoprazole EC tablet 40 mg BID AC    [MAR Hold - Suspended Admission] predniSONE tablet 5 mg Daily    [MAR Hold - Suspended Admission] prochlorperazine injection Soln 5 mg Q6H PRN    [MAR Hold - Suspended Admission] quetiapine split tablet 12.5 mg QHS    ropivacaine 0.2% [facility] PainPRO Pump infusion 500 ML Continuous    [MAR Hold - Suspended Admission] senna-docusate 8.6-50 mg per tablet 1 tablet BID PRN    [MAR Hold - Suspended Admission] simethicone chewable tablet 80 mg QID PRN    [MAR Hold - Suspended Admission] sodium chloride 0.9% bolus 250 mL 250 mL PRN    [MAR Hold - Suspended Admission] sodium chloride 0.9% bolus 250 mL 250 mL PRN    [MAR Hold - Suspended Admission] sodium chloride 0.9% flush 10 mL PRN    [MAR Hold - Suspended Admission] sodium chloride 0.9% flush 10 mL PRN    [MAR Hold - Suspended Admission] sodium chloride 0.9% flush 10 mL Q6H    And    [MAR Hold - Suspended Admission] sodium chloride 0.9% flush 10 mL PRN    [MAR Hold - Suspended Admission] sucralfate tablet 1 g TID PRN    [MAR Hold - Suspended Admission] vancomycin - pharmacy to dose pharmacy to manage frequency    [MAR Hold - Suspended Admission] white petrolatum 41 % ointment Daily       Objective:     Vital Signs (Most Recent):  Temp: 97.7 °F (36.5 °C)  (01/17/23 1020)  Pulse: 70 (01/17/23 1105)  Resp: 14 (01/17/23 1105)  BP: 134/70 (01/17/23 1105)  SpO2: 100 % (01/17/23 1105)   Vital Signs (24h Range):  Temp:  [96.2 °F (35.7 °C)-98.7 °F (37.1 °C)] 97.7 °F (36.5 °C)  Pulse:  [58-77] 70  Resp:  [11-18] 14  SpO2:  [93 %-100 %] 100 %  BP: (133-180)/(60-86) 134/70     Weight: 63.6 kg (140 lb 3.4 oz) (01/17/23 0400)  Body mass index is 26.49 kg/m².  Body surface area is 1.65 meters squared.    I/O last 3 completed shifts:  In: 500 [Blood:500]  Out: -     Physical Exam  Vitals reviewed.   Constitutional:       General: She is not in acute distress.     Appearance: She is not toxic-appearing.   HENT:      Head: Normocephalic and atraumatic.   Eyes:      General: No scleral icterus.     Extraocular Movements: Extraocular movements intact.   Pulmonary:      Effort: Pulmonary effort is normal. No respiratory distress.   Musculoskeletal:      Right lower leg: No edema.      Left lower leg: No edema.   Skin:     General: Skin is warm and dry.      Findings: No rash.   Neurological:      General: No focal deficit present.      Mental Status: She is alert and oriented to person, place, and time. Mental status is at baseline.       Significant Labs:  All labs within the past 24 hours have been reviewed.     Significant Imaging:  Labs: Reviewed

## 2023-01-17 NOTE — SUBJECTIVE & OBJECTIVE
Interval History:  Patient had TDC placed today.  Nephrology following.  Patient does not have any new complaints.  Awaiting placement to rehab.    Review of Systems  Objective:     Vital Signs (Most Recent):  Temp: 97.7 °F (36.5 °C) (01/17/23 1200)  Pulse: 68 (01/17/23 1200)  Resp: 15 (01/17/23 1200)  BP: 136/72 (01/17/23 1404)  SpO2: 100 % (01/17/23 1200) Vital Signs (24h Range):  Temp:  [96.2 °F (35.7 °C)-98.7 °F (37.1 °C)] 97.7 °F (36.5 °C)  Pulse:  [58-77] 68  Resp:  [11-18] 15  SpO2:  [93 %-100 %] 100 %  BP: (133-180)/(60-86) 136/72     Weight: 63.6 kg (140 lb 3.4 oz)  Body mass index is 26.49 kg/m².    Intake/Output Summary (Last 24 hours) at 1/17/2023 1602  Last data filed at 1/16/2023 2045  Gross per 24 hour   Intake 500 ml   Output --   Net 500 ml      Physical Exam  Vitals reviewed.   Constitutional:       General: She is not in acute distress.     Appearance: Normal appearance. She is not toxic-appearing.   HENT:      Head: Normocephalic and atraumatic.   Eyes:      General: No scleral icterus.  Cardiovascular:      Rate and Rhythm: Normal rate and regular rhythm.      Pulses: Normal pulses.      Heart sounds: No murmur heard.  Pulmonary:      Effort: Pulmonary effort is normal. No respiratory distress.      Breath sounds: No wheezing, rhonchi or rales.   Abdominal:      General: Bowel sounds are normal. There is no distension.      Tenderness: There is no abdominal tenderness. There is no guarding.   Musculoskeletal:      Right lower leg: No edema.      Left lower leg: No edema.   Skin:     General: Skin is warm.   Neurological:      General: No focal deficit present.      Mental Status: She is alert and oriented to person, place, and time. Mental status is at baseline.   Psychiatric:         Behavior: Behavior normal.         Thought Content: Thought content normal.

## 2023-01-17 NOTE — PLAN OF CARE
Spoke with pt's daughter, [facility] [phone].  She wanted to make sure everyone was on the same page as far as pt's dc plan.  CM to f/u.    ALIZA ToussaintN, BS, RN, CCM

## 2023-01-17 NOTE — ASSESSMENT & PLAN NOTE
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 Now iHD for which she receives HD on one but usually twice weekly for metabolic clearance via tunneled HD catheter roughly x2 a week with last HD Friday (01/06/2023).    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  2)  Necrotizing cresentic glomerulonephritis/[id number] necrotizing cresecentic glomerulonephritis.  3)  Focal acute pyelonephritis.  4)  Mild-to-moderate arterionephrosclerosis    Plan/Recommendations:  - no indication for urgent RRT at this time   - Continue Bicarb tabs 650mg TID  - Continue renvela 800mg TID with meals  - Start Lokelma 10g QD if K>5.   - Per ID recs okay to place line once blood cultures negative x 48hrs. IR consulted with tentative plan to place perm cath on 01/17.   - can continue Lasix 40 mg daily for now   - currently on prednisone taper 10 mg daily x5 days followed by 5 mg daily with atovaquone 1.5 grams faily for PJP prophylaxis   - renal diet if not NPO  - strict I/Os and daily weights  - daily RFPs and magnesium level  - renally dose all medications to eGFR  - avoid nephrotoxic agents when feasible (i.e. intra-arterial contrast, NSAIDs, supra-therapeutic vancomycin troughs, etc.)

## 2023-01-17 NOTE — PLAN OF CARE
Pt arrived to 189 for tunneled CVC for HD. Pt oriented to unit and staff. Plan of care reviewed with patient, patient verbalizes understanding. Comfort measures utilized. Pt safely transferred from stretcher to procedural table. Fall risk reviewed with patient, fall risk interventions maintained.  Blankets applied. Pt prepped and draped utilizing standard sterile technique. Patient placed on continuous monitoring, as required by sedation policy. Timeouts completed utilizing standard universal time-out, per department and facility policy. RN to remain at bedside, continuous monitoring maintained. Pt resting comfortably. Denies pain/discomfort. Will continue to monitor. See flow sheets for monitoring, medication administration, and updates.

## 2023-01-17 NOTE — PT/OT/SLP PROGRESS
Physical Therapy Co-Treatment  Co-treatment with OT for maximal pt participation, safety, and activity tolerance  (Only able to be seen for single treatment after procedure)    Patient Name:  Kristin Goodman   MRN:  [id number]  Admitting Diagnosis:  Bacteremia due to Enterococcus   Recent Surgery: * No surgery found *    Admit Date: 1/9/2023  Length of Stay: 8 days    Recommendations:     Discharge Recommendations:  rehabilitation facility   Discharge Equipment Recommendations: walker, rolling   Barriers to discharge: Evolving Clinical Presentation    Assessment:     Krsitin Goodman is a 77 y.o. female admitted with a medical diagnosis of Bacteremia due to Enterococcus.  She presents with the following impairments/functional limitations:  weakness, impaired functional mobility, impaired endurance, gait instability, impaired balance, impaired self care skills, decreased lower extremity function, decreased safety awareness.     Pt agreeable to therapy, reports she is still a bit woozy after anaesthesia but is agreeable to walk. Pt stands from bedside chair to RW w/ SBA, ambulates in hallway w/ RW and SBA, ambulates in room with no A/D, HHA, CGA. Continue to train gait and incorporate stairs and gait without A/D to facilitate return to PLOF.    Rehab Prognosis: Good; patient would benefit from acute skilled PT services to address these deficits and reach maximum level of function.    Recent Surgery: * No surgery found *      Treatment Tolerated: Well    Highest level of mobility achieved this visit: ambulates 120ft w/ RW and SBA, 10ft w/ HHA and CGA    Activity with RN/PCT: ambulate with 1 person assist    Plan:     During this hospitalization, patient to be seen 3 x/week to address the identified rehab impairments via gait training, therapeutic activities, therapeutic exercises, neuromuscular re-education and progress toward the following goals:    Plan of Care Expires:  02/09/23    Subjective     CRISTINE Andrade notified  "prior to session. Pt's sister present upon PT entrance into room.    Chief Complaint: fatigue  Patient/Family Comments/goals: "I gotta get my legs back under me"  Pain/Comfort:  Pain Rating 1: 0/10      Objective:     Additional staff present: OT Donn    Patient found up in chair with:  (no active lines)   Cognition:   Mildly Lethargic  Command following: Follows one-step verbal commands  Fluency: clear/fluent  General Precautions: Standard, Cardiac fall   Orthopedic Precautions:N/A   Braces: N/A   Body mass index is 26.49 kg/m².  Oxygen Device: Room Air      Vitals: BP (!) 140/63   Pulse 72   Temp 98.6 °F (37 °C)   Resp 14   Ht 5' 1" (1.549 m)   Wt 63.6 kg (140 lb 3.4 oz)   SpO2 99%   Breastfeeding No   BMI 26.49 kg/m²     Outcome Measures:  AM-PAC 6 CLICK MOBILITY  Turning over in bed (including adjusting bedclothes, sheets and blankets)?: 3  Sitting down on and standing up from a chair with arms (e.g., wheelchair, bedside commode, etc.): 3  Moving from lying on back to sitting on the side of the bed?: 3  Moving to and from a bed to a chair (including a wheelchair)?: 3  Need to walk in hospital room?: 3  Climbing 3-5 steps with a railing?: 2  Basic Mobility Total Score: 17     Functional Mobility:    Bed Mobility:   Pt found/returned to bedside chair    Transfers:   Sit <> Stand Transfer: stand by assistance with rolling walker   Stand <> Sit Transfer: stand by assistance with rolling walker   [id number] from bedside chair    Standing Balance:  Assistance Level Required: Stand-by Assistance  Patient used: rolling walker   Time: 10 min  Postural deviations noted: no deviations noted      Gait:  Patient ambulated: 120ft (RW) + 10ft (HHA)  Patient required: standy by assistance (RW) and contact guard (HHA)  Patient used:  hand-held assist and rolling walker   Gait Pattern observed: swing-through gait  Gait Deviation(s): decreased step length, decreased weight shift, and decreased obey  Impairments due to: " impaired balance, decreased strength, and decreased endurance  Education:  Time provided for education, counseling and discussion of health disposition in regards to patient's current status  All questions answered within PT scope of practice and to patient's satisfaction  PT role in POC to address current functional deficits  Pt educated on proper body mechanics, safety techniques, and energy conservation with PT facilitation and cueing throughout session    Patient left up in chair with call button in reach and sister present.    GOALS:   Multidisciplinary Problems       Physical Therapy Goals          Problem: Physical Therapy    Goal Priority Disciplines Outcome Goal Variances Interventions   Physical Therapy Goal     PT, PT/OT Ongoing, Progressing     Description: Goals to be met by: 23     Patient will increase functional independence with mobility by performin. Sit to stand transfer with Supervision  2. Gait  x 150 feet with Supervision using Rolling Walker.   3. Stand for 8 minutes with Supervision using Rolling Walker                       Time Tracking:     PT Received On: 23  PT Start Time: 1404     PT Stop Time: 1415  PT Total Time (min): 11 min     Billable Minutes:   Gait Training 10 min    Treatment Type: Treatment  PT/PTA: PT       Khai Velasco, PT, DPT  2023

## 2023-01-17 NOTE — PROGRESS NOTES
[facility] ([place] [address])  Nephrology  Progress Note    Patient Name: Kristin Goodman  MRN: [id number]  Admission Date: 1/9/2023  Hospital Length of Stay: 8 days  Attending Provider: Miguel Snider MD   Primary Care Physician: Lori Hernandez MD  Principal Problem:Bacteremia due to Enterococcus    Subjective:     HPI: Ms Goodman is a 77-year-old woman with hypertension, hypothyroidism, HFpEF (most recent TTE with EF 65% with G1DD), pulmonary hypertension, current ESBL E. coli UTI, osteoarthritis, chronic back pain, DONAVAN and microscopic polyangiitis complicated base on biopsy from 10/26 by renal failure, GIB and respiratory failure with history of diffuse alveolar hemorrhage s/p IV Solumedrol, PLEX x5 sessions, Rituximab x4 doses and cyclophosphamide however now  just on steroid taper (cyclophosphamide appears to be hold secondary to anemia) now iHD dependent twice weekly via tunneled HD catheter for metabolic clearance (follows with Dr. Mojica with [facility]) who presented from [facility] for TDC removal in the setting of positive blood cultures growing Enterococcus faecalis and Bacillus species from cultures obtained on 1/5 & 1/7. In addition patient with reported syncopal event and sluggish flows on HD on 1/9 (incomplete session, daughter attributes to iron infusion) with last full session of iHD being on 1/6. She reports still making good urine without any urinary complaints today. Nephrology has been consulted for inpatient HD needs.      No new subjective & objective note has been filed under this hospital service since the last note was generated.    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 Now iHD for which she receives HD on one but usually twice weekly for metabolic clearance via tunneled HD catheter roughly x2 a week with last HD Friday (01/06/2023).    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  3)  Focal acute pyelonephritis.  4)  Mild-to-moderate arterionephrosclerosis    Plan/Recommendations:  - no indication for urgent RRT at this time   - Continue Bicarb tabs 650mg TID  - Continue renvela 800mg TID with meals  - Start Lokelma 10g QD if K>5.   - Per ID recs okay to place line once blood cultures negative x 48hrs. IR consulted with tentative plan to place perm cath on 01/17.   - can continue Lasix 40 mg daily for now   - currently on prednisone taper 10 mg daily x5 days followed by 5 mg daily with atovaquone 1.5 grams faily for PJP prophylaxis   - renal diet if not NPO  - strict I/Os and daily weights  - daily RFPs and magnesium level  - renally dose all medications to eGFR  - avoid nephrotoxic agents when feasible (i.e. intra-arterial contrast, NSAIDs, supra-therapeutic vancomycin troughs, etc.)      Thank you for your consult. I will follow-up with patient. Please contact us if you have any additional questions.    Jeanne Russo MD  Nephrology  [facility] ([facility])

## 2023-01-17 NOTE — PT/OT/SLP PROGRESS
Occupational Therapy   Treatment    Name: Kristin Goodman  MRN: [id number]  Admitting Diagnosis:  Bacteremia due to Enterococcus       Recommendations:     Discharge Recommendations: rehabilitation facility  Discharge Equipment Recommendations:  none  Barriers to discharge:  None    Assessment:     Kristin Goodman is a 77 y.o. female with a medical diagnosis of Bacteremia due to Enterococcus.  She presents with decreased act narcisa but progress c/ LB dressing and fx mob this date, req SBA for fx mob c/ RW and CGA for fx mob c/ handheld assist. Performance deficits affecting function are weakness, impaired endurance, impaired functional mobility.     Rehab Prognosis:  Good; patient would benefit from acute skilled OT services to address these deficits and reach maximum level of function.       Plan:     Patient to be seen 3 x/week to address the above listed problems via self-care/home management, therapeutic activities, therapeutic exercises  Plan of Care Expires: 01/20/23  Plan of Care Reviewed with: patient, sibling    Subjective     Pain/Comfort:  Pain Rating 1: 0/10    Objective:     Communicated with: RN and PT prior to session.  Patient found up in chair with Other (comments) (no active lines) upon OT entry to room.    General Precautions: Standard, fall    Orthopedic Precautions:N/A  Braces: N/A  Respiratory Status: Room air     Occupational Performance:     Functional Mobility/Transfers:  Patient completed Sit <> Stand Transfer with stand by assistance  with  rolling walker   Functional Mobility: Pt completed ambulation ~130ft c/ SBA and RW use. Pt c/ no LOB, SOB, or c/o pain during fx mob. Pt completed ~10ft c/ no AD and CGA/Handheld assist.     Activities of Daily Living:  Lower Body Dressing: supervision socks don/doff  Pt refused G/H because she already completed same.     [facility] 6 Click ADL: 21    Treatment & Education:  Pt educated on pacing and energy conservation for ADL and fx mob. Pt educated on safety c/  RW, and pt refused attempting steps this date. Pt completed LB dressing this date c/ SPV seated UIC. Pt tolerated act Fair this date.     Patient left up in chair with call button in reach, RN notified, and family (sister) present    GOALS:   Multidisciplinary Problems       Occupational Therapy Goals          Problem: Occupational Therapy    Goal Priority Disciplines Outcome Interventions   Occupational Therapy Goal     OT, PT/OT Ongoing, Progressing    Description: Goals to be met by: 1/17/2023 (1 week)     Patient will increase functional independence with ADLs by performing:    UE Dressing with [place].  LE Dressing with [place].  Grooming while standing at sink with [place].  Toileting from toilet with [place] for hygiene and clothing management.   Step transfer with [place]  Toilet transfer to toilet with [place].                         Time Tracking:     OT Date of Treatment: 01/17/23  OT Start Time: 1404  OT Stop Time: 1416  OT Total Time (min): 12 min    Billable Minutes:Self Care/Home Management 12    OT/SARAHI: OT          1/17/2023

## 2023-01-17 NOTE — ASSESSMENT & PLAN NOTE
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 Now iHD for which she receives HD on one but usually twice weekly for metabolic clearance via tunneled HD catheter roughly x2 a week with last HD Friday (01/06/2023).    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  3)  Focal acute pyelonephritis.  4)  Mild-to-moderate arterionephrosclerosis    Plan/Recommendations:  - permcath replaced 1/17  - Continue Bicarb tabs 650mg TID  - Continue renvela 800mg TID with meals  - continue OP dialysis schedule  - safe for discharge from nephrology standpoint once she gets her HD session today  - currently on prednisone 5 mg daily with atovaquone 1.5 grams faily for PJP prophylaxis   - renal diet if not NPO  - strict I/Os and daily weights  - renally dose all medications to eGFR  - avoid nephrotoxic agents when feasible (i.e. intra-arterial contrast, NSAIDs, supra-therapeutic vancomycin troughs, etc.)

## 2023-01-18 LAB
ALBUMIN SERPL BCP-MCNC: 2.3 G/DL (ref 3.5–5.2)
ANION GAP SERPL CALC-SCNC: 10 MMOL/L (ref 8–16)
BUN SERPL-MCNC: 48 MG/DL (ref 8–23)
CALCIUM SERPL-MCNC: 8 MG/DL (ref 8.7–10.5)
CHLORIDE SERPL-SCNC: 106 MMOL/L (ref 95–110)
CO2 SERPL-SCNC: 27 MMOL/L (ref 23–29)
CREAT SERPL-MCNC: 3.9 MG/DL (ref 0.5–1.4)
EST. GFR  (NO RACE VARIABLE): 11.5 ML/MIN/1.73 M^2
GLUCOSE SERPL-MCNC: 115 MG/DL (ref 70–110)
PHOSPHATE SERPL-MCNC: 2.4 MG/DL (ref 2.7–4.5)
POTASSIUM SERPL-SCNC: 3.1 MMOL/L (ref 3.5–5.1)
SODIUM SERPL-SCNC: 143 MMOL/L (ref 136–145)
VANCOMYCIN SERPL-MCNC: 15.4 UG/ML

## 2023-01-18 PROCEDURE — 12000002 HC ACUTE/MED SURGE SEMI-PRIVATE ROOM

## 2023-01-18 PROCEDURE — 36415 COLL VENOUS BLD VENIPUNCTURE: CPT | Performed by: STUDENT IN AN ORGANIZED HEALTH CARE EDUCATION/TRAINING PROGRAM

## 2023-01-18 PROCEDURE — 25000003 PHARM REV CODE 250: Performed by: HOSPITALIST

## 2023-01-18 PROCEDURE — 63600175 PHARM REV CODE 636 W HCPCS: Performed by: STUDENT IN AN ORGANIZED HEALTH CARE EDUCATION/TRAINING PROGRAM

## 2023-01-18 PROCEDURE — 99222 PR INITIAL HOSPITAL CARE,LEVL II: ICD-10-PCS | Mod: ,,, | Performed by: NURSE PRACTITIONER

## 2023-01-18 PROCEDURE — 25000003 PHARM REV CODE 250: Performed by: STUDENT IN AN ORGANIZED HEALTH CARE EDUCATION/TRAINING PROGRAM

## 2023-01-18 PROCEDURE — 99232 SBSQ HOSP IP/OBS MODERATE 35: CPT | Mod: ,,, | Performed by: STUDENT IN AN ORGANIZED HEALTH CARE EDUCATION/TRAINING PROGRAM

## 2023-01-18 PROCEDURE — 90935 HEMODIALYSIS ONE EVALUATION: CPT

## 2023-01-18 PROCEDURE — 99222 1ST HOSP IP/OBS MODERATE 55: CPT | Mod: ,,, | Performed by: NURSE PRACTITIONER

## 2023-01-18 PROCEDURE — 63600175 PHARM REV CODE 636 W HCPCS: Mod: JG | Performed by: STUDENT IN AN ORGANIZED HEALTH CARE EDUCATION/TRAINING PROGRAM

## 2023-01-18 PROCEDURE — 63600175 PHARM REV CODE 636 W HCPCS: Performed by: HOSPITALIST

## 2023-01-18 PROCEDURE — 99232 PR SUBSEQUENT HOSPITAL CARE,LEVL II: ICD-10-PCS | Mod: ,,, | Performed by: STUDENT IN AN ORGANIZED HEALTH CARE EDUCATION/TRAINING PROGRAM

## 2023-01-18 PROCEDURE — 80069 RENAL FUNCTION PANEL: CPT | Performed by: STUDENT IN AN ORGANIZED HEALTH CARE EDUCATION/TRAINING PROGRAM

## 2023-01-18 PROCEDURE — 80202 ASSAY OF VANCOMYCIN: CPT | Performed by: STUDENT IN AN ORGANIZED HEALTH CARE EDUCATION/TRAINING PROGRAM

## 2023-01-18 RX ORDER — PREDNISONE 5 MG/1
5 TABLET ORAL DAILY
Start: 2023-01-18 | End: 2023-01-31 | Stop reason: SDUPTHER

## 2023-01-18 RX ORDER — ATOVAQUONE 750 MG/5ML
1500 SUSPENSION ORAL DAILY
Start: 2023-01-18 | End: 2023-01-31 | Stop reason: SDUPTHER

## 2023-01-18 RX ADMIN — VANCOMYCIN HYDROCHLORIDE 500 MG: 500 INJECTION, POWDER, LYOPHILIZED, FOR SOLUTION INTRAVENOUS at 09:01

## 2023-01-18 RX ADMIN — WHITE PETROLATUM: 1.75 OINTMENT TOPICAL at 09:01

## 2023-01-18 RX ADMIN — PANTOPRAZOLE SODIUM 40 MG: 40 TABLET, DELAYED RELEASE ORAL at 05:01

## 2023-01-18 RX ADMIN — LEVOTHYROXINE SODIUM 25 MCG: 25 TABLET ORAL at 05:01

## 2023-01-18 RX ADMIN — ATOVAQUONE 1500 MG: 750 SUSPENSION ORAL at 09:01

## 2023-01-18 RX ADMIN — SODIUM BICARBONATE 650 MG: 650 TABLET ORAL at 09:01

## 2023-01-18 RX ADMIN — CARVEDILOL 25 MG: 25 TABLET, FILM COATED ORAL at 09:01

## 2023-01-18 RX ADMIN — PREDNISONE 5 MG: 5 TABLET ORAL at 09:01

## 2023-01-18 RX ADMIN — SEVELAMER CARBONATE 800 MG: 800 TABLET, FILM COATED ORAL at 05:01

## 2023-01-18 RX ADMIN — QUETIAPINE FUMARATE 12.5 MG: 25 TABLET ORAL at 09:01

## 2023-01-18 RX ADMIN — FUROSEMIDE 40 MG: 40 TABLET ORAL at 09:01

## 2023-01-18 RX ADMIN — SEVELAMER CARBONATE 800 MG: 800 TABLET, FILM COATED ORAL at 09:01

## 2023-01-18 RX ADMIN — HEPARIN SODIUM 1000 UNITS: 1000 INJECTION, SOLUTION INTRAVENOUS; SUBCUTANEOUS at 04:01

## 2023-01-18 RX ADMIN — MAGNESIUM OXIDE TAB 400 MG (241.3 MG ELEMENTAL MG) 400 MG: 400 (241.3 MG) TAB at 09:01

## 2023-01-18 RX ADMIN — SEVELAMER CARBONATE 800 MG: 800 TABLET, FILM COATED ORAL at 01:01

## 2023-01-18 RX ADMIN — ALTEPLASE 4 MG: 2.2 INJECTION, POWDER, LYOPHILIZED, FOR SOLUTION INTRAVENOUS at 03:01

## 2023-01-18 RX ADMIN — Medication 1 TABLET: at 09:01

## 2023-01-18 RX ADMIN — SODIUM BICARBONATE 650 MG: 650 TABLET ORAL at 05:01

## 2023-01-18 NOTE — PROGRESS NOTES
Pharmacokinetic Assessment Follow Up: IV Vancomycin    Vancomycin serum concentration assessment(s):    -Random of 15.4 within goal of 15 - 20 for Enterococcus bacteremia  -- Active order for HD today, nephrology confirmed she will receive HD today     Vancomycin Regimen Plan:      - Administer vancomycin 500 mg AFTER dialysis    - Re-dose when random levels < 20 mcg/mL, draw random level tomorrow with am labs if patient does not receive dialysis today/this evening.     Drug levels (last 3 results):  Recent Labs   Lab Result Units 01/17/23  0521 01/18/23  0246   Vancomycin, Random ug/mL 17.5 15.4       Pharmacy will continue to follow and monitor vancomycin.    Please contact pharmacy at extension [phone] for questions regarding this assessment.    Thank you for the consult,   Romario SalcedoD       Patient brief summary:  Kristin Goodman is a 77 y.o. female initiated on antimicrobial therapy with IV Vancomycin for treatment of bacteremia    The patient's current regimen is vancomycin pulse dosing    Drug Allergies:   Review of patient's allergies indicates:   Allergen Reactions    Ampicillin     Peaches [peach (prunus persica)] Other (See Comments)     Pt unable to state type of reaction. Information obtained from daughter who states she was informed of allergy from patient.    Penicillins      Other reaction(s): Hives, anaphylaxis    Sulfa (sulfonamide antibiotics) Rash and Hives       Actual Body Weight:   63.6 kg    Renal Function:   Estimated Creatinine Clearance: 5.9 mL/min (A) (based on SCr of 7 mg/dL (H)).,     Dialysis Method (if applicable):  intermittent HD    CBC (last 72 hours):  Recent Labs   Lab Result Units 01/16/23  0532 01/16/23  1159   WBC K/uL 14.06*  --    Hemoglobin g/dL 6.7* 6.8*   Hematocrit % 22.3*  --    Platelets K/uL 362  --    Gran % % 62.0  --    Lymph % % 24.3  --    Mono % % 10.6  --    Eosinophil % % 0.4  --    Basophil % % 0.1  --    Differential Method  Automated  --           Metabolic Panel (last 72 hours):  Recent Labs   Lab Result Units 01/15/23  1321 01/16/23  0532   Sodium mmol/L 141 142   Potassium mmol/L 4.1 3.7   Chloride mmol/L 105 106   CO2 mmol/L 19* 20*   Glucose mg/dL 124* 79   BUN mg/dL 88* 79*   Creatinine mg/dL 7.3* 7.0*   Albumin g/dL 2.6* 2.4*   Phosphorus mg/dL 4.8* 4.5         Vancomycin Administrations:  vancomycin given in the last 96 hours                     vancomycin (VANCOCIN) 500 mg in dextrose 5 % in water (D5W) 5 % 100 mL IVPB (MB+) (mg) 500 mg New Bag 01/14/23 1015                    Microbiologic Results:  Microbiology Results (last 7 days)       Procedure Component Value Units Date/Time    Blood culture [[id number]] Collected: 01/11/23 0958    Order Status: Completed Specimen: Blood Updated: 01/16/23 1212     Blood Culture, Routine No growth after 5 days.    Narrative:      Collection has been rescheduled by [email or web address] at 01/11/2023 09:23 Reason:   Patient unavailable is a hard stick Suzy [id number]  Collection has been rescheduled by [email or web address] at 01/11/2023 09:23 Reason:   Patient unavailable is a hard stick Suzy [id number]    Blood culture [[id number]] Collected: 01/11/23 0958    Order Status: Completed Specimen: Blood Updated: 01/16/23 1212     Blood Culture, Routine No growth after 5 days.    Narrative:      Collection has been rescheduled by [email or web address] at 01/11/2023 09:23 Reason:   Patient unavailable is a hard stick Suzy [id number]  Collection has been rescheduled by [email or web address] at 01/11/2023 09:23 Reason:   Patient unavailable is a hard stick Suzy [id number]    Blood culture [[id number]]  (Abnormal) Collected: 01/09/23 2348    Order Status: Completed Specimen: Blood from Peripheral, Lower Arm, Right Updated: 01/12/23 1040     Blood Culture, Routine Gram stain sumi bottle: Gram positive cocci in chains resembling Strep      Results called to and read back by: Ej Haley RN  01/10/2023  15:49      ENTEROCOCCUS FAECALIS  For susceptibility see order #[id number]      Narrative:       Right Peripheral    Blood culture [[id number]]  (Abnormal)  (Susceptibility) Collected: 01/09/23 2148    Order Status: Completed Specimen: Blood from Peripheral, Lower Arm, Left Updated: 01/12/23 1039     Blood Culture, Routine Gram stain sumi bottle: Gram positive cocci in chains resembling Strep      Positive results previously called 01/10/2023      ENTEROCOCCUS FAECALIS    Narrative:      Left Peripheral

## 2023-01-18 NOTE — PLAN OF CARE
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]  Unknown    Urinary tract infection due to extended-spectrum beta lactamase (ESBL) producing Escherichia coli [N39.0, B96.29, Z16.12]  Yes    Anemia due to chronic kidney disease [N18.9, D63.1]  Yes    Gastric reflux [K21.9]  Yes    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       Patient is homebound due to:  Bacteremia due to Enterococcus    Allergies:  Review of patient's allergies indicates:   Allergen Reactions    Ampicillin     Peaches [peach (prunus persica)] Other (See Comments)     Pt unable to state type of reaction. Information obtained from daughter who states she was informed of allergy from patient.    Penicillins      Other reaction(s): Hives, anaphylaxis    Sulfa (sulfonamide antibiotics) Rash and Hives       Vitals:  Routine    Diet: Renal diet    Activities:   Activity as tolerated    Goals of Care Treatment Preferences:  Code Status: Full Code    Nursing Precautions:  Fall    Consults:   PT to evaluate and treat- 5 times a week and OT to evaluate and treat- 5 times a week     Outpatient Antibiotic Therapy Plan for placement:     1) Infection: Efaecalis bacteremia      2) Discharge Antibiotics:     Intravenous antibiotics:  Vancomycin  pharm to dose - plans to transfer to rehab            3) Therapy Duration:  2w from cleared blcx     Estimated end date of IV antibiotics: 1/24/23     4) Outpatient Weekly Labs:     Order the following labs to be drawn on Mondays:   CBC  CMP   Vancomycin trough. Target 15-20     If discharged on vancomycin IV, order the following additional labs to be drawn on Thursdays:  CMP   Vancomycin trough. Target 15-20     If vancomycin trough is not at target (15-20) prior to discharge, schedule vancomycin trough to be drawn before their fourth outpatient dose.     5) Fax Lab Results to Infectious Diseases Provider: bandar     [facility] Fax Number: [phone]     6) Outpatient Infectious Diseases Follow-up     Follow-up appointment will be arranged by the ID clinic         Medications: Discontinue all previous medication orders, if any. See new list below.     Medication List        START taking these medications      atovaquone 750 mg/5 mL Susp oral liquid  Commonly known as: MEPRON  Take 10 mLs (1,500 mg total) by mouth once daily.     predniSONE 5 MG tablet  Commonly known as: DELTASONE  Take 1 tablet (5 mg total) by mouth once daily.     VANCOMYCIN 1 G/250 ML D5W (READY TO MIX SYSTEM)  Inject 250 mLs (1,000 mg total) into the vein once daily. Vancomycin to be dosed and monitored by pharmacy at SNF/rehab. Goal vanc levels 15-20. for 6 days            CONTINUE taking these medications      albuterol 90 mcg/actuation inhaler  Commonly known as: VENTOLIN HFA  Inhale 2 puffs into the lungs every 6 (six) hours as needed for Shortness of Breath. Rescue     amLODIPine 10 MG tablet  Commonly known as: NORVASC  Take 1 tablet (10 mg total) by mouth once daily.     aspirin 325 MG tablet  Take 1 tablet at lunch daily starting after surgery for 6 weeks to prevent DVT.     diclofenac sodium 1 % Gel  Commonly known as: VOLTAREN  Apply 2 g topically 4 (four) times daily. for 10 days     epinastine 0.05 % ophthalmic  solution  Place 1 drop into both eyes once daily.     EUTHYROX 25 MCG tablet  Generic drug: levothyroxine  Take 1 tablet by mouth once daily     fish,bora,flax oils-om3,6,9no1 1,200 mg Cap  Commonly known as: OMEGA 3-6-9  Take 1 each by mouth once daily.     fluticasone propionate 50 mcg/actuation nasal spray  Commonly known as: FLONASE  1 spray (50 mcg total) by Each Nostril route 2 (two) times daily.     FLUZONE HIGHDOSE QUAD 20-21  mcg/0.7 mL Syrg  Generic drug: flu vacc pl3190-89(65yr up)-PF  ADM 0.7ML IM UTD     hydrALAZINE 100 MG tablet  Commonly known as: APRESOLINE  TAKE 1 TABLET BY MOUTH THREE TIMES DAILY     HYDROcodone-acetaminophen 5-325 mg per tablet  Commonly known as: NORCO  Take 1 tablet by mouth every 6 (six) hours as needed.     ibuprofen 800 MG tablet  Commonly known as: ADVIL,MOTRIN  Take 800 mg by mouth every 8 (eight) hours as needed.     levocetirizine 5 MG tablet  Commonly known as: XYZAL  Take 1 tablet (5 mg total) by mouth every evening. For sinus     lisinopriL 40 MG tablet  Commonly known as: PRINIVIL,ZESTRIL  Take 1 tablet by mouth once daily     meloxicam 15 MG tablet  Commonly known as: MOBIC  Take 1 tablet (15 mg total) by mouth daily as needed (arthritis).     methocarbamoL 500 MG Tab  Commonly known as: ROBAXIN  Take 1 tablet (500 mg total) by mouth 3 (three) times daily as needed (muscle spasms).     metoprolol succinate 50 MG 24 hr tablet  Commonly known as: TOPROL-XL  Take 1 tablet by mouth once daily     mupirocin 2 % ointment  Commonly known as: BACTROBAN  Apply topically 2 (two) times daily.     tiZANidine 4 MG tablet  Commonly known as: ZANAFLEX  Take 1 tablet by mouth twice daily as needed     TYLENOL ARTHRITIS PAIN ORAL  Take 1 tablet by mouth once daily.                Immunizations Administered as of 1/18/2023       Name Date Dose VIS Date Route Exp Date    COVID-19, MRNA, LN-S, PF (Moderna) 12/13/2021 0.25 mL -- Intramuscular --    Site: Left arm     Lot: [id number]      COVID-19, MRNA, LN-S, PF (Moderna) 2/6/2021 -- -- -- --    Lot: [id number]     COVID-19, MRNA, LN-S, PF (Moderna) 1/9/2021 -- -- Intramuscular --    Site: Left arm     Lot: [id number]                 Some patients may experience side effects after vaccination.  These may include fever, headache, muscle or joint aches.  Most symptoms resolve with 24-48 hours and do not require urgent medical evaluation unless they persist for more than 72 hours or symptoms are concerning for an unrelated medical condition.          _________________________________  Miguel Snider MD  01/18/2023

## 2023-01-18 NOTE — PLAN OF CARE
01/18/23 0938   Post-Acute Status   Post-Acute Authorization Placement   Coverage PHN   Discharge Plan   Discharge Plan A Skilled Nursing Facility     Spoke with pt's daughter, Roro [phone] via phone, who explained that pt was in [facility] prior to admit; they were working on getting her in to [facility] while she was there; she spoke with BRIAN Chua, who said she would qualify.  Instructed daughter that pt doesn't qualify medically for rehab.      9:41 AM CM to call pt's daughter, Roro [phone] and explain that prior placement doesn't qualify her for appropriate d/c to rehab; insurance won't approve rehab.  She doesn't medically qualify for rehab; MD is recommending SNF.  What facility would they prefer? Daughter states she was never d/c'd from [facility] due to 9 day rule; she was never d/c'd from  [facility] extended [facility].  She didn't qualify for SNF before d/t she didn't get HD regularly.  To her understanding CM was waiting for [facility] CM to call BRIAN Chua.  They tried before to get her placed; it was very difficult.  CM to consult with supervisor and call daughter back.    10:59 AM Messaged Kimberley Puri if pt is on CHRIST.  She responded that pt has extended past her christ but she will look at her again. She spoke to Isela a few days ago and they let her know she was going to Rehab. She will review her again. We would need to get a new PHN auth.     3:56 PM Sent SNF referrals through [facility] to all in-network providers.    4:17 PM Sent fax to Heydi at [facility] [phone] with SNF request.    Kimberley Guerra, ALIZAN, BS, RN, CCM

## 2023-01-18 NOTE — HPI
Kristin Goodman is a 77-year-old female with PMHx of HTN, HF, hypothyroidism, osteoarthritis, and new acute renal failure due to new diagnosis of Microscopic Polyangiitis s/p renal biopsy and requiring HD. Patient [place] [facility]23 from [facility] for concerns of new bacteremia. Positive blood cxs 1/5, 1/7, 1/9. Received 1 gm IV vancomycin at [facility]AC on 1/10. HD tunnel cath removed 1/10 and midline removed.Will be on 2 weeks vanc from negative bcxs/line removal. CT abdomen pelvis negative. Hospital course complicated by UTI (Ertapenem).     Functional History: Patient lives with daughter in a apt with ramp to enter  Prior to admission, Robin. DME: JEN.
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
## 2023-01-18 NOTE — SUBJECTIVE & OBJECTIVE
Interval History:  Patient does not have any new complaints today.  Nephrology following, plans for hemodialysis today.  Awaiting placement in SNF.    Review of Systems  Objective:     Vital Signs (Most Recent):  Temp: 98.5 °F (36.9 °C) (01/18/23 1157)  Pulse: 82 (01/18/23 1157)  Resp: 18 (01/18/23 1157)  BP: (!) 149/65 (01/18/23 1157)  SpO2: 96 % (01/18/23 1157)   Vital Signs (24h Range):  Temp:  [98.3 °F (36.8 °C)-99.1 °F (37.3 °C)] 98.5 °F (36.9 °C)  Pulse:  [69-87] 82  Resp:  [14-18] 18  SpO2:  [93 %-99 %] 96 %  BP: (136-172)/(63-74) 149/65     Weight: 63.2 kg (139 lb 5.3 oz)  Body mass index is 26.33 kg/m².    Intake/Output Summary (Last 24 hours) at 1/18/2023 1358  Last data filed at 1/18/2023 0900  Gross per 24 hour   Intake 240 ml   Output --   Net 240 ml      Physical Exam  Vitals reviewed.   Constitutional:       General: She is not in acute distress.     Appearance: Normal appearance. She is not toxic-appearing.   HENT:      Head: Normocephalic and atraumatic.   Eyes:      General: No scleral icterus.  Cardiovascular:      Rate and Rhythm: Normal rate and regular rhythm.      Pulses: Normal pulses.      Heart sounds: No murmur heard.  Pulmonary:      Effort: Pulmonary effort is normal. No respiratory distress.      Breath sounds: No wheezing, rhonchi or rales.   Abdominal:      General: Bowel sounds are normal. There is no distension.      Tenderness: There is no abdominal tenderness. There is no guarding.   Musculoskeletal:      Right lower leg: No edema.      Left lower leg: No edema.   Skin:     General: Skin is warm.   Neurological:      General: No focal deficit present.      Mental Status: She is alert and oriented to person, place, and time. Mental status is at baseline.   Psychiatric:         Behavior: Behavior normal.         Thought Content: Thought content normal.       Significant Labs: All pertinent labs within the past 24 hours have been reviewed.    Significant Imaging: I have reviewed all  pertinent imaging results/findings within the past 24 hours.

## 2023-01-18 NOTE — ASSESSMENT & PLAN NOTE
-due to Microscopic Polyangiitis  -HD on hold due to line holiday. HDS, euvolemic, labs reviewed - no emergent indication for HD today  -bcxs ng x 48 hours, ID is ok with hd cath insertion.   - IR consulted. S/p permcath placement  on 1/17/23   -renal function panel daily  -nephrology following.  Plans for hemodialysis today.
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
## 2023-01-18 NOTE — PROGRESS NOTES
Cathflo withdrawn from arterial and venous chamber positive blood return noted on both sides. HD started and discontinued due to increased arterial and venous chamber pressure. Arterial chamber pressuer -320 with blood flow rate 250. MD notified awaiting new orders. Pt TOSHIA, TASIA.
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
## 2023-01-18 NOTE — PROGRESS NOTES
[facility] ([place] [id number])  Nephrology  Progress Note    Patient Name: Kristin Goodman  MRN: [id number]  Admission Date: 1/9/2023  Hospital Length of Stay: 9 days  Attending Provider: Miguel Snider MD   Primary Care Physician: Lori Hernandez MD  Principal Problem:Bacteremia due to Enterococcus    Subjective:     HPI: Ms Goodman is a 77-year-old woman with hypertension, hypothyroidism, HFpEF (most recent TTE with EF 65% with G1DD), pulmonary hypertension, current ESBL E. coli UTI, osteoarthritis, chronic back pain, DONAVAN and microscopic polyangiitis complicated base on biopsy from 10/26 by renal failure, GIB and respiratory failure with history of diffuse alveolar hemorrhage s/p IV Solumedrol, PLEX x5 sessions, Rituximab x4 doses and cyclophosphamide however now  just on steroid taper (cyclophosphamide appears to be hold secondary to anemia) now iHD dependent twice weekly via tunneled HD catheter for metabolic clearance (follows with Dr. Mojica with [facility]) who presented from [facility] for TDC removal in the setting of positive blood cultures growing Enterococcus faecalis and Bacillus species from cultures obtained on 1/5 & 1/7. In addition patient with reported syncopal event and sluggish flows on HD on 1/9 (incomplete session, daughter attributes to iron infusion) with last full session of iHD being on 1/6. She reports still making good urine without any urinary complaints today. Nephrology has been consulted for inpatient HD needs.      Interval History:     No acute events overnight.On room air. No SOB. No s/s of uremia. Permacath replacement yesterday. HD session today.     Review of patient's allergies indicates:   Allergen Reactions    Ampicillin     Peaches [peach (prunus persica)] Other (See Comments)     Pt unable to state type of reaction. Information obtained from daughter who states she was informed of allergy from patient.    Penicillins      Other reaction(s): Hives,  anaphylaxis    Sulfa (sulfonamide antibiotics) Rash and Hives     Current Facility-Administered Medications   Medication Frequency    0.9%  NaCl infusion (for blood administration) Q24H PRN    acetaminophen tablet 650 mg Q4H PRN    albuterol-ipratropium 2.5 mg-0.5 mg/3 mL nebulizer solution 3 mL Q4H PRN    aluminum-magnesium hydroxide 200-200 mg/5 mL suspension 30 mL QID PRN    atovaquone 750 mg/5 mL oral liquid 1,500 mg Daily    B-complex with vitamin C tablet 1 tablet Daily    bisacodyL suppository 10 mg Daily PRN    carvediloL tablet 25 mg BID    cloNIDine tablet 0.1 mg Q8H PRN    epoetin hira injection 4,000 Units Every Tues, Thurs, Sat    furosemide tablet 40 mg Daily    heparin (porcine) injection 1,000 Units PRN    levothyroxine tablet 25 mcg Before breakfast    magnesium oxide tablet 400 mg Daily    meclizine tablet 25 mg TID PRN    melatonin tablet 6 mg Nightly PRN    methocarbamoL tablet 500 mg TID PRN    naloxone 0.4 mg/mL injection 0.02 mg PRN    ondansetron injection 4 mg Q8H PRN    pantoprazole EC tablet 40 mg BID AC    predniSONE tablet 5 mg Daily    prochlorperazine injection Soln 5 mg Q6H PRN    quetiapine split tablet 12.5 mg QHS    senna-docusate 8.6-50 mg per tablet 1 tablet BID PRN    sevelamer carbonate tablet 800 mg TID WM    simethicone chewable tablet 80 mg QID PRN    sodium bicarbonate tablet 650 mg TID    sodium chloride 0.9% flush 10 mL PRN    sodium chloride 0.9% flush 10 mL Q6H PRN    sucralfate tablet 1 g TID PRN    vancomycin - pharmacy to dose pharmacy to manage frequency    white petrolatum 41 % ointment Daily     Facility-Administered Medications Ordered in Other Encounters   Medication Frequency    [MAR Hold - Suspended Admission] 0.9%  NaCl infusion (for blood administration) Q24H PRN    [MAR Hold - Suspended Admission] 0.9%  NaCl infusion PRN    [MAR Hold - Suspended Admission] 0.9%  NaCl infusion Once    [MAR Hold - Suspended Admission]  acetaminophen tablet 650 mg Q4H PRN    [MAR Hold - Suspended Admission] albuterol-ipratropium 2.5 mg-0.5 mg/3 mL nebulizer solution 3 mL Q4H PRN    [MAR Hold - Suspended Admission] alteplase injection 2 mg PRN    [MAR Hold - Suspended Admission] alteplase injection 2 mg PRN    [MAR Hold - Suspended Admission] aluminum-magnesium hydroxide 200-200 mg/5 mL suspension 30 mL QID PRN    [MAR Hold - Suspended Admission] atovaquone 750 mg/5 mL oral liquid 1,500 mg Daily    [MAR Hold - Suspended Admission] B complex-vitamin C-folic acid 0.8 mg Tab 0.8 mg Daily    [MAR Hold - Suspended Admission] bisacodyL suppository 10 mg Daily PRN    [MAR Hold - Suspended Admission] carvediloL tablet 25 mg BID    celecoxib capsule 400 mg Once    [MAR Hold - Suspended Admission] cloNIDine tablet 0.1 mg Q8H PRN    [MAR Hold - Suspended Admission] dextrose 10% bolus 125 mL 125 mL PRN    [MAR Hold - Suspended Admission] dextrose 10% bolus 250 mL 250 mL PRN    [MAR Hold - Suspended Admission] epoetin hira-epbx injection 10,000 Units Every Mon, Wed, Fri    fentaNYL 50 mcg/mL injection  mcg PRN    [MAR Hold - Suspended Admission] furosemide tablet 40 mg Daily    [MAR Hold - Suspended Admission] heparin (porcine) injection 1,000 Units PRN    [MAR Hold - Suspended Admission] heparin, porcine (PF) 100 unit/mL injection flush 500 Units PRN    [MAR Hold - Suspended Admission] heparin, porcine (PF) 100 unit/mL injection flush 500 Units PRN    [MAR Hold - Suspended Admission] levothyroxine tablet 25 mcg Before breakfast    LIDOcaine (PF) 10 mg/ml (1%) injection 10 mg Once PRN    LIDOcaine (PF) 10 mg/ml (1%) injection 10 mg Once    [MAR Hold - Suspended Admission] magnesium oxide tablet 400 mg Daily    [MAR Hold - Suspended Admission] meclizine tablet 25 mg TID PRN    [MAR Hold - Suspended Admission] melatonin tablet 6 mg Nightly PRN    [MAR Hold - Suspended Admission] methocarbamoL tablet 500 mg TID PRN    [MAR Hold -  Suspended Admission] metoclopramide HCl injection 5 mg Q6H PRN    midazolam (VERSED) 1 mg/mL injection 0.5-4 mg PRN    [MAR Hold - Suspended Admission] naloxone 0.4 mg/mL injection 0.02 mg PRN    [MAR Hold - Suspended Admission] ondansetron injection 4 mg Q8H PRN    [MAR Hold - Suspended Admission] pantoprazole EC tablet 40 mg BID AC    [MAR Hold - Suspended Admission] predniSONE tablet 5 mg Daily    [MAR Hold - Suspended Admission] prochlorperazine injection Soln 5 mg Q6H PRN    [MAR Hold - Suspended Admission] quetiapine split tablet 12.5 mg QHS    ropivacaine 0.2% Nimbus PainPRO Pump infusion 500 ML Continuous    [MAR Hold - Suspended Admission] senna-docusate 8.6-50 mg per tablet 1 tablet BID PRN    [MAR Hold - Suspended Admission] simethicone chewable tablet 80 mg QID PRN    [MAR Hold - Suspended Admission] sodium chloride 0.9% bolus 250 mL 250 mL PRN    [MAR Hold - Suspended Admission] sodium chloride 0.9% bolus 250 mL 250 mL PRN    [MAR Hold - Suspended Admission] sodium chloride 0.9% flush 10 mL PRN    [MAR Hold - Suspended Admission] sodium chloride 0.9% flush 10 mL PRN    [MAR Hold - Suspended Admission] sodium chloride 0.9% flush 10 mL Q6H    And    [MAR Hold - Suspended Admission] sodium chloride 0.9% flush 10 mL PRN    [MAR Hold - Suspended Admission] sucralfate tablet 1 g TID PRN    [MAR Hold - Suspended Admission] vancomycin - pharmacy to dose pharmacy to manage frequency    [MAR Hold - Suspended Admission] white petrolatum 41 % ointment Daily       Objective:     Vital Signs (Most Recent):  Temp: 97.7 °F (36.5 °C) (01/17/23 1020)  Pulse: 70 (01/17/23 1105)  Resp: 14 (01/17/23 1105)  BP: 134/70 (01/17/23 1105)  SpO2: 100 % (01/17/23 1105)   Vital Signs (24h Range):  Temp:  [96.2 °F (35.7 °C)-98.7 °F (37.1 °C)] 97.7 °F (36.5 °C)  Pulse:  [58-77] 70  Resp:  [11-18] 14  SpO2:  [93 %-100 %] 100 %  BP: (133-180)/(60-86) 134/70     Weight: 63.6 kg (140 lb 3.4 oz) (01/17/23 0400)  Body  mass index is 26.49 kg/m².  Body surface area is 1.65 meters squared.    I/O last 3 completed shifts:  In: 500 [Blood:500]  Out: -     Physical Exam  Vitals reviewed.   Constitutional:       General: She is not in acute distress.     Appearance: She is not toxic-appearing.   HENT:      Head: Normocephalic and atraumatic.   Eyes:      General: No scleral icterus.     Extraocular Movements: Extraocular movements intact.   Pulmonary:      Effort: Pulmonary effort is normal. No respiratory distress.   Musculoskeletal:      Right lower leg: No edema.      Left lower leg: No edema.   Skin:     General: Skin is warm and dry.      Findings: No rash.   Neurological:      General: No focal deficit present.      Mental Status: She is alert and oriented to person, place, and time. Mental status is at baseline.       Significant Labs:  All labs within the past 24 hours have been reviewed.     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 Now iHD for which she receives HD on one but usually twice weekly for metabolic clearance via tunneled HD catheter roughly x2 a week with last HD Friday (01/06/2023).    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  3)  Focal acute pyelonephritis.  4)  Mild-to-moderate arterionephrosclerosis    Plan/Recommendations:  - permcath replaced 1/17  - Continue Bicarb tabs 650mg TID  - Continue renvela 800mg TID with meals  - continue OP dialysis schedule  - safe for discharge from nephrology standpoint once she gets her HD session today  - currently on prednisone 5 mg daily with atovaquone 1.5 grams faily for PJP prophylaxis   - renal diet if not NPO  - strict I/Os and daily weights  - renally dose all medications to eGFR  - avoid nephrotoxic agents when feasible (i.e. intra-arterial contrast, NSAIDs, supra-therapeutic vancomycin troughs, etc.)    Microscopic polyangiitis  -        Thank you for your consult. I will sign off. Please contact us if you have any additional questions.    Jeanne Russo MD  Nephrology  [facility] ([facility]

## 2023-01-18 NOTE — PROGRESS NOTES
Pt arrived to PANCHO unit via stretcher @ 0605. 2 person assist to bed. Pt AA, VSS. Unable to aspirate blood from venous and arterial cath ports. Cathflo was ordered for pt and instilled to fill volume of arterial and venous cath chamber. Pt TOSHIA, VSS, NAD.

## 2023-01-18 NOTE — ASSESSMENT & PLAN NOTE
- Positive blood cxs 1/5, 1/7, 1/9. Received 1 gm IV vancomycin at LTAC on 1/10. HD tunnel cath removed 1/10 and midline removed.Will be on 2 weeks vanc from negative bcxs/line removal.

## 2023-01-19 PROBLEM — Z75.8 DISCHARGE PLANNING ISSUES: Status: ACTIVE | Noted: 2023-01-19

## 2023-01-19 PROBLEM — Z02.9 DISCHARGE PLANNING ISSUES: Status: ACTIVE | Noted: 2023-01-19

## 2023-01-19 PROBLEM — I82.90 ACUTE DEEP VEIN THROMBOSIS (DVT) OF NON-EXTREMITY VEIN: Status: ACTIVE | Noted: 2023-01-19

## 2023-01-19 LAB
ALBUMIN SERPL BCP-MCNC: 2.4 G/DL (ref 3.5–5.2)
ANION GAP SERPL CALC-SCNC: 14 MMOL/L (ref 8–16)
APTT BLDCRRT: 33.7 SEC (ref 21–32)
BASOPHILS # BLD AUTO: 0.04 K/UL (ref 0–0.2)
BASOPHILS NFR BLD: 0.3 % (ref 0–1.9)
BUN SERPL-MCNC: 72 MG/DL (ref 8–23)
CALCIUM SERPL-MCNC: 8.5 MG/DL (ref 8.7–10.5)
CHLORIDE SERPL-SCNC: 105 MMOL/L (ref 95–110)
CO2 SERPL-SCNC: 24 MMOL/L (ref 23–29)
CREAT SERPL-MCNC: 5.7 MG/DL (ref 0.5–1.4)
DIFFERENTIAL METHOD: ABNORMAL
EOSINOPHIL # BLD AUTO: 0.1 K/UL (ref 0–0.5)
EOSINOPHIL NFR BLD: 0.8 % (ref 0–8)
ERYTHROCYTE [DISTWIDTH] IN BLOOD BY AUTOMATED COUNT: 16.7 % (ref 11.5–14.5)
EST. GFR  (NO RACE VARIABLE): 7.3 ML/MIN/1.73 M^2
GLUCOSE SERPL-MCNC: 89 MG/DL (ref 70–110)
HCT VFR BLD AUTO: 27.7 % (ref 37–48.5)
HGB BLD-MCNC: 8.5 G/DL (ref 12–16)
IMM GRANULOCYTES # BLD AUTO: 0.38 K/UL (ref 0–0.04)
IMM GRANULOCYTES NFR BLD AUTO: 2.6 % (ref 0–0.5)
INR PPP: 1.1 (ref 0.8–1.2)
LYMPHOCYTES # BLD AUTO: 3 K/UL (ref 1–4.8)
LYMPHOCYTES NFR BLD: 20.8 % (ref 18–48)
MCH RBC QN AUTO: 28 PG (ref 27–31)
MCHC RBC AUTO-ENTMCNC: 30.7 G/DL (ref 32–36)
MCV RBC AUTO: 91 FL (ref 82–98)
MONOCYTES # BLD AUTO: 1.6 K/UL (ref 0.3–1)
MONOCYTES NFR BLD: 11.3 % (ref 4–15)
NEUTROPHILS # BLD AUTO: 9.4 K/UL (ref 1.8–7.7)
NEUTROPHILS NFR BLD: 64.2 % (ref 38–73)
NRBC BLD-RTO: 0 /100 WBC
PHOSPHATE SERPL-MCNC: 3.6 MG/DL (ref 2.7–4.5)
PLATELET # BLD AUTO: 324 K/UL (ref 150–450)
PMV BLD AUTO: 10.3 FL (ref 9.2–12.9)
POTASSIUM SERPL-SCNC: 3.5 MMOL/L (ref 3.5–5.1)
PROTHROMBIN TIME: 11.7 SEC (ref 9–12.5)
RBC # BLD AUTO: 3.04 M/UL (ref 4–5.4)
SODIUM SERPL-SCNC: 143 MMOL/L (ref 136–145)
VANCOMYCIN SERPL-MCNC: 21.4 UG/ML
WBC # BLD AUTO: 14.55 K/UL (ref 3.9–12.7)

## 2023-01-19 PROCEDURE — 25000003 PHARM REV CODE 250: Performed by: HOSPITALIST

## 2023-01-19 PROCEDURE — 99233 PR SUBSEQUENT HOSPITAL CARE,LEVL III: ICD-10-PCS | Mod: 95,,, | Performed by: INTERNAL MEDICINE

## 2023-01-19 PROCEDURE — 25000003 PHARM REV CODE 250: Performed by: STUDENT IN AN ORGANIZED HEALTH CARE EDUCATION/TRAINING PROGRAM

## 2023-01-19 PROCEDURE — 85730 THROMBOPLASTIN TIME PARTIAL: CPT | Performed by: INTERNAL MEDICINE

## 2023-01-19 PROCEDURE — 80202 ASSAY OF VANCOMYCIN: CPT | Performed by: STUDENT IN AN ORGANIZED HEALTH CARE EDUCATION/TRAINING PROGRAM

## 2023-01-19 PROCEDURE — 36415 COLL VENOUS BLD VENIPUNCTURE: CPT | Performed by: INTERNAL MEDICINE

## 2023-01-19 PROCEDURE — 80069 RENAL FUNCTION PANEL: CPT | Performed by: INTERNAL MEDICINE

## 2023-01-19 PROCEDURE — 85025 COMPLETE CBC W/AUTO DIFF WBC: CPT | Performed by: INTERNAL MEDICINE

## 2023-01-19 PROCEDURE — 36415 COLL VENOUS BLD VENIPUNCTURE: CPT | Performed by: STUDENT IN AN ORGANIZED HEALTH CARE EDUCATION/TRAINING PROGRAM

## 2023-01-19 PROCEDURE — 85610 PROTHROMBIN TIME: CPT | Performed by: INTERNAL MEDICINE

## 2023-01-19 PROCEDURE — 12000002 HC ACUTE/MED SURGE SEMI-PRIVATE ROOM

## 2023-01-19 PROCEDURE — 63600175 PHARM REV CODE 636 W HCPCS: Performed by: HOSPITALIST

## 2023-01-19 PROCEDURE — 99233 SBSQ HOSP IP/OBS HIGH 50: CPT | Mod: 95,,, | Performed by: INTERNAL MEDICINE

## 2023-01-19 PROCEDURE — 63600175 PHARM REV CODE 636 W HCPCS: Performed by: INTERNAL MEDICINE

## 2023-01-19 PROCEDURE — 63600175 PHARM REV CODE 636 W HCPCS: Mod: JG | Performed by: INTERNAL MEDICINE

## 2023-01-19 RX ORDER — HEPARIN SODIUM,PORCINE/D5W 25000/250
0-40 INTRAVENOUS SOLUTION INTRAVENOUS CONTINUOUS
Status: DISCONTINUED | OUTPATIENT
Start: 2023-01-19 | End: 2023-01-24

## 2023-01-19 RX ADMIN — FUROSEMIDE 40 MG: 40 TABLET ORAL at 08:01

## 2023-01-19 RX ADMIN — SODIUM BICARBONATE 650 MG: 650 TABLET ORAL at 09:01

## 2023-01-19 RX ADMIN — SEVELAMER CARBONATE 800 MG: 800 TABLET, FILM COATED ORAL at 12:01

## 2023-01-19 RX ADMIN — ATOVAQUONE 1500 MG: 750 SUSPENSION ORAL at 08:01

## 2023-01-19 RX ADMIN — PREDNISONE 5 MG: 5 TABLET ORAL at 08:01

## 2023-01-19 RX ADMIN — ERYTHROPOIETIN 4000 UNITS: 4000 INJECTION, SOLUTION INTRAVENOUS; SUBCUTANEOUS at 12:01

## 2023-01-19 RX ADMIN — CARVEDILOL 25 MG: 25 TABLET, FILM COATED ORAL at 08:01

## 2023-01-19 RX ADMIN — SEVELAMER CARBONATE 800 MG: 800 TABLET, FILM COATED ORAL at 08:01

## 2023-01-19 RX ADMIN — SODIUM BICARBONATE 650 MG: 650 TABLET ORAL at 08:01

## 2023-01-19 RX ADMIN — MAGNESIUM OXIDE TAB 400 MG (241.3 MG ELEMENTAL MG) 400 MG: 400 (241.3 MG) TAB at 08:01

## 2023-01-19 RX ADMIN — PANTOPRAZOLE SODIUM 40 MG: 40 TABLET, DELAYED RELEASE ORAL at 05:01

## 2023-01-19 RX ADMIN — ACETAMINOPHEN 650 MG: 325 TABLET ORAL at 10:01

## 2023-01-19 RX ADMIN — Medication 1 TABLET: at 08:01

## 2023-01-19 RX ADMIN — QUETIAPINE FUMARATE 12.5 MG: 25 TABLET ORAL at 09:01

## 2023-01-19 RX ADMIN — SODIUM BICARBONATE 650 MG: 650 TABLET ORAL at 04:01

## 2023-01-19 RX ADMIN — LEVOTHYROXINE SODIUM 25 MCG: 25 TABLET ORAL at 05:01

## 2023-01-19 RX ADMIN — HEPARIN SODIUM 12 UNITS/KG/HR: 10000 INJECTION, SOLUTION INTRAVENOUS at 11:01

## 2023-01-19 RX ADMIN — CARVEDILOL 25 MG: 25 TABLET, FILM COATED ORAL at 09:01

## 2023-01-19 RX ADMIN — PANTOPRAZOLE SODIUM 40 MG: 40 TABLET, DELAYED RELEASE ORAL at 04:01

## 2023-01-19 RX ADMIN — SEVELAMER CARBONATE 800 MG: 800 TABLET, FILM COATED ORAL at 04:01

## 2023-01-19 RX ADMIN — WHITE PETROLATUM: 1.75 OINTMENT TOPICAL at 10:01

## 2023-01-19 NOTE — CONSULTS
Thank you for your consult to [facility]. We have reviewed the patient chart. This patient does meet criteria for [facility] service at this time. Will assume care on 01/20/23 at 7AM.    Haleigh Parks MD

## 2023-01-19 NOTE — PLAN OF CARE
Problem: Adult Inpatient Plan of Care  Goal: Plan of Care Review  Outcome: Ongoing, Progressing  Goal: Optimal Comfort and Wellbeing  Outcome: Ongoing, Progressing  Goal: Readiness for Transition of Care  Outcome: Ongoing, Progressing     Problem: Infection  Goal: Absence of Infection Signs and Symptoms  Outcome: Ongoing, Progressing     Problem: Renal Function Impairment (Acute Kidney Injury/Impairment)  Goal: Effective Renal Function  Outcome: Ongoing, Progressing     Problem: Skin Injury Risk Increased  Goal: Skin Health and Integrity  Outcome: Ongoing, Progressing     Problem: Hemodynamic Instability (Hemodialysis)  Goal: Effective Tissue Perfusion  Outcome: Ongoing, Progressing     Problem: Fall Injury Risk  Goal: Absence of Fall and Fall-Related Injury  Outcome: Ongoing, Progressing

## 2023-01-19 NOTE — PROGRESS NOTES
Pharmacokinetic Assessment Follow Up: IV Vancomycin    Vancomycin serum concentration assessment(s):    ESRD on HD, patient received HD 1/18, however HD discontinued early after initiation due to increased arterial and venous chamber pressure.   Vancomycin 500 mg given after dialysis session    The random level was drawn correctly and can be used to guide therapy at this time. The measurement is above the desired definitive target range of 15 to 20 mcg/mL.    Vancomycin Regimen Plan:    Hold vancomycin administration today   Re-dose when the random level is less than 20 mcg/mL, next level to be drawn with tomorrow with am labs.     Drug levels (last 3 results):  Recent Labs   Lab Result Units 01/17/23  0521 01/18/23  0246 01/19/23  0311   Vancomycin, Random ug/mL 17.5 15.4 21.4       Pharmacy will continue to follow and monitor vancomycin.    Please contact pharmacy at extension [phone] for questions regarding this assessment.    Thank you for the consult,   Paul Vee       Patient brief summary:  Kristin Goodman is a 77 y.o. female initiated on antimicrobial therapy with IV Vancomycin for treatment of bacteremia      Drug Allergies:   Review of patient's allergies indicates:   Allergen Reactions    Ampicillin     Peaches [peach (prunus persica)] Other (See Comments)     Pt unable to state type of reaction. Information obtained from daughter who states she was informed of allergy from patient.    Penicillins      Other reaction(s): Hives, anaphylaxis    Sulfa (sulfonamide antibiotics) Rash and Hives       Actual Body Weight:   63.2 kg    Renal Function:   Estimated Creatinine Clearance: 7.3 mL/min (A) (based on SCr of 5.7 mg/dL (H)).,     Dialysis Method (if applicable):  intermittent HD    CBC (last 72 hours):  Recent Labs   Lab Result Units 01/16/23  1159 01/19/23  0311   WBC K/uL  --  14.55*   Hemoglobin g/dL 6.8* 8.5*   Hematocrit %  --  27.7*   Platelets K/uL  --  324   Gran % %  --  64.2   Lymph % %  --   20.8   Mono % %  --  11.3   Eosinophil % %  --  0.8   Basophil % %  --  0.3   Differential Method   --  Automated       Metabolic Panel (last 72 hours):  Recent Labs   Lab Result Units 01/18/23  1727 01/19/23  0311   Sodium mmol/L 143 143   Potassium mmol/L 3.1* 3.5   Chloride mmol/L 106 105   CO2 mmol/L 27 24   Glucose mg/dL 115* 89   BUN mg/dL 48* 72*   Creatinine mg/dL 3.9* 5.7*   Albumin g/dL 2.3* 2.4*   Phosphorus mg/dL 2.4* 3.6       Vancomycin Administrations:  vancomycin given in the last 96 hours                     vancomycin (VANCOCIN) 500 mg in dextrose 5 % in water (D5W) 5 % 100 mL IVPB (MB+) (mg) 500 mg New Bag 01/18/23 2105    vancomycin (VANCOCIN) 250 mg in dextrose 5 % 100 mL IVPB (mg) 250 mg New Bag 01/15/23 1228                    Microbiologic Results:  Microbiology Results (last 7 days)       Procedure Component Value Units Date/Time    Blood culture [[id number]] Collected: 01/11/23 0958    Order Status: Completed Specimen: Blood Updated: 01/16/23 1212     Blood Culture, Routine No growth after 5 days.    Narrative:      Collection has been rescheduled by [email or web address] at 01/11/2023 09:23 Reason:   Patient unavailable is a hard stick Suzy [id number]  Collection has been rescheduled by [email or web address] at 01/11/2023 09:23 Reason:   Patient unavailable is a hard stick Suzy [id number]    Blood culture [[id number]] Collected: 01/11/23 0958    Order Status: Completed Specimen: Blood Updated: 01/16/23 1212     Blood Culture, Routine No growth after 5 days.    Narrative:      Collection has been rescheduled by [email or web address] at 01/11/2023 09:23 Reason:   Patient unavailable is a hard stick Suzy [id number]  Collection has been rescheduled by DNM1 at 01/11/2023 09:23 Reason:   Patient unavailable is a hard stick Suzy [id number]    Blood culture [[id number]]  (Abnormal) Collected: 01/09/23 2348    Order Status: Completed Specimen: Blood from Peripheral, Lower Arm, Right Updated: 01/12/23 1040     Blood Culture, Routine Gram stain sumi bottle: Gram  positive cocci in chains resembling Strep      Results called to and read back by: Ej Haley RN  01/10/2023  15:49      ENTEROCOCCUS FAECALIS  For susceptibility see order #[id number]      Narrative:      Right Peripheral    Blood culture [[id number]]  (Abnormal)  (Susceptibility) Collected: 01/09/23 3508    Order Status: Completed Specimen: Blood from Peripheral, Lower Arm, Left Updated: 01/12/23 1039     Blood Culture, Routine Gram stain sumi bottle: Gram positive cocci in chains resembling Strep      Positive results previously called 01/10/2023      ENTEROCOCCUS FAECALIS    Narrative:      Left Peripheral

## 2023-01-19 NOTE — PLAN OF CARE
01/19/23 0826   Post-Acute Status   Post-Acute Authorization Placement   Coverage PHN   Discharge Delays (!) Other  (did not get succesful HD 1/18/23)   Discharge Plan   Discharge Plan A Skilled Nursing Facility     In Bk, Ej Pererajorge [facility] interested - need more info, will send to DON for review.  CM responded, asked what more info needed.  CM will follow.    1:05 PM Rec'd call from Aide at [facility] to see if pt still needed placement.  Yes.  CM needs to see if pt/family will accept [facility].  Roro Goodman (Daughter)   [phone]     Called Roro Goodman, asked if [facility] would be acceptable for SNF placement.  She wants a list of accepting facilities.  This patient has been accepted by [facility] so far.  Daughter states she needs to know about dialysis 0 pt has acute kidney disease, gets intermittent dialysis.   She wants her to have intense rehab like she was getting prior.  CM instructed that she doesn't qualify for [facility] or rehab, insurance would deny.  Instructed that [facility] generally transports pt to dialysis, MD will monitor pt at SNF; MD will send orders from here.  She would like a call from attending MD and nephrologist - she has lots of questions.  Messaged attending MD: Dr. Concepcion, this pt's daughter, Roro Goodman [phone] would like a phone call from attending MD and nephrologist.  Would you please call her, and who is the nephrologist, please.    2:46 PM Spoke with Heydi from [facility] [phone] who states that they have SNF request approved pending if there is an in-network accepting facility.  CM informed that daughter still deciding - call her and update - she'll send approval letter.    Kimberley Guerra, ALIZAN, BS, RN, CCM

## 2023-01-19 NOTE — SUBJECTIVE & OBJECTIVE
Interval History: Virtual follow up visit for suspected Bacteremia [R78.81]  Syncope [R55]  Acute renal failure with tubular necrosis [N17.0]  Microscopic polyangiitis [M31.7]  Bacteremia due to Enterococcus [R78.81, B95.2]  Encounter for continuous renal replacement therapy (CRRT) for acute renal failure [N17.9] present on admission.   This service was provided by telemedicine.    Patient was transferred to [facility] on: 1/19/2023  [place] [facility] [place] [facility] [place] [facility]   Admitted 1/9/2023  5:27 PM    The patient is able to provide adequate history. Additional history was obtained from: sibling and chart review.  No significant events overnight reported.  Patient reports complaints R of facial swelling and R throat discomfort with swallowing   Symptoms are increasing in severity since yesterday.     I have reviewed the following on 1/19/2023:     Details     [x]   Lab results  H&H increased appropriately    []   Micro reports     []   Pathology reports     []   Imaging reports     []   Cardiology Procedure reports     []   Outside records/CareEverywhere     []  Independently viewed:       Review of Systems   Constitutional:  Negative for fever.   Respiratory:  Negative for shortness of breath.      Objective:     Vital Signs (Most Recent):  Temp: 99.2 °F (37.3 °C) (01/19/23 1145)  Pulse: 80 (01/19/23 1145)  Resp: 18 (01/19/23 1145)  BP: (!) 162/74 (01/19/23 1145)  SpO2: 98 % (01/19/23 1145)   Vital Signs (24h Range):  Temp:  [99 °F (37.2 °C)-99.2 °F (37.3 °C)] 99.2 °F (37.3 °C)  Pulse:  [71-92] 80  Resp:  [18-20] 18  SpO2:  [92 %-98 %] 98 %  BP: (102-185)/(66-77) 162/74     Weight: 63.2 kg (139 lb 5.3 oz)  Body mass index is 26.33 kg/m².  No intake or output data in the 24 hours ending 01/19/23 1401   Physical Exam  Constitutional:       Appearance: Normal appearance.   Cardiovascular:      Comments: Monitor and/or Vital signs reviewed at time of visit  Pulmonary:      Effort: Pulmonary effort is  normal. No accessory muscle usage or respiratory distress.   Neurological:      Mental Status: She is alert. She is not disoriented.   Psychiatric:         Attention and Perception: Attention normal.         Mood and Affect: Affect normal.         Behavior: Behavior is cooperative.       Significant Labs:  Recent Labs   Lab 08/02/22  1156 12/13/22  0847   HGBA1C 5.5 4.5     Recent Labs   Lab 01/16/23  0532 01/16/23  1159 01/19/23  0311   WBC 14.06*  --  14.55*   HGB 6.7* 6.8* 8.5*   HCT 22.3*  --  27.7*     --  324     Recent Labs   Lab 01/16/23  0532 01/19/23  0311   GRAN 62.0  8.7* 64.2  9.4*   LYMPH 24.3  3.4 20.8  3.0   MONO 10.6  1.5* 11.3  1.6*   EOS 0.1 0.1     Recent Labs   Lab 01/16/23  0532 01/18/23  1727 01/19/23  0311    143 143   K 3.7 3.1* 3.5    106 105   CO2 20* 27 24   BUN 79* 48* 72*   CREATININE 7.0* 3.9* 5.7*   GLU 79 115* 89   CALCIUM 8.4* 8.0* 8.5*   ALBUMIN 2.4* 2.3* 2.4*   PHOS 4.5 2.4* 3.6     Recent Labs   Lab 09/24/22  1120 10/14/22  1638 10/17/22  1200 10/23/22  1822 10/30/22  0426 11/25/22  0255 01/02/23  0438 01/09/23  1931   PROCAL 0.06 0.12  --   --   --   --   --   --    LACTATE 0.7  --  0.9 0.9  --   --   --  1.4   DDIMER  --  1.96*  --   --   --   --   --   --    FERRITIN  --   --   --   --  374* 588* 378*  --      SARS-CoV2 (COVID-19) Qualitative PCR (no units)   Date Value   10/24/2022 Not Detected   02/14/2022 Not Detected   09/25/2020 Not Detected   05/21/2020 Not Detected     SARS-CoV-2 RNA, Amplification, Qual (no units)   Date Value   10/17/2022 Negative     POC Rapid COVID (no units)   Date Value   09/24/2022 Negative       ECG Results              EKG 12-lead (Final result)  Result time 01/10/23 13:19:47      Final result by Interface, Lab In [facility] (01/10/23 13:19:47)                   Narrative:    Test Reason : R55,    Vent. Rate : 074 BPM     Atrial Rate : 074 BPM     P-R Int : 120 ms          QRS Dur : 068 ms      QT Int : 376 ms       P-R-T  Axes : 053 045 034 degrees     QTc Int : 417 ms    Normal sinus rhythm  Normal ECG  When compared with ECG of 12-DEC-2022 07:34,  Criteria for Septal infarct are no longer Present  Confirmed by Dieudonne Rice MD ([id number]) on 1/10/2023 1:19:39 PM    Referred By: SAYRA   SELF           Confirmed By:Dieudonne Rice MD                                    Results for orders placed during the hospital encounter of 01/09/23    Echo    Interpretation Summary  · Normal systolic function.  · The estimated ejection fraction is 60%.  · Grade II left ventricular diastolic dysfunction.  · Small circumferential pericardial effusion.  · Moderate to severe left atrial enlargement.  · Mild mitral regurgitation.  · Mild to moderate tricuspid regurgitation.  · Normal right ventricular size with normal right ventricular systolic function.  · The quantitatively derived ejection fraction is 56%.  · Normal central venous pressure (3 mmHg).  · The estimated PA systolic pressure is 36 mmHg.  · No obvious vegitations.      IR Tunneled Catheter Insert w/o Port  Narrative: EXAMINATION:  Tunneled central venous catheter placement    Procedural Personnel    Attending physician(s): David    Fellow physician(s): None    Resident physician(s): None    Advanced practice provider(s): None    Pre-procedure diagnosis: ESRD    Post-procedure diagnosis: Same    Indication: Performance of hemodialysis    Additional clinical history: None    Complications: No immediate complications.    TECHNIQUE:  - Venous access with ultrasound guidance    - Tunneled central venous catheter insertion with fluoroscopic guidance    FINDINGS:  Pre-procedure    History and imaging of central venous access reviewed (QCDR): Yes    Consent: Informed consent for the procedure was obtained and time-out was performed prior to the procedure.    Prophylactic antibiotic administered: More than 1 hour from procedure start time  or more than 2 hours for vancomycin or  fluoroquinolones    Preparation (MIPS): The site was prepared and draped using all elements of maximal sterile barrier technique including sterile gloves, sterile gown, cap, mask, large sterile sheet, sterile ultrasound probe cover, hand hygiene and cutaneous antisepsis with 2% chlorhexidine.    Medical reason for site preparation exception (MIPS): Not applicable    Anesthesia/sedation    Level of anesthesia/sedation: Moderate sedation (conscious sedation)    Anesthesia/sedation administered by: Independent trained observer under attending supervision with continuous monitoring of the patient's level of consciousness and physiologic status    Total intra-service sedation time (minutes): 15    Access    Local anesthesia was administered. The vessel was sonographically evaluated and determined to be patent. Real time ultrasound was used to visualize needle entry into the vessel and a permanent image was stored.    Vein accessed: Internal jugular vein    Access technique: Micropuncture set with 21 gauge needle    Venography    Indication for venography: Not performed    Catheter tip position for venography: Not applicable    Venous segment imaged: Not applicable    Not applicable    Catheter placement    An incision was made near the venous access site and the catheter was tunneled subcutaneously to the venous access site . The catheter was advanced via a peel-away sheath into the vein under fluoroscopic guidance. Catheter tip location was fluoroscopically verified and a permanent image was stored.    Catheter placed: Duo glide    Catheter size ([place]): 14.5    Catheter cuff-to-tip or trimmed length (cm): 19    Catheter tip position: 2.5 VBUs below remy.    Unique Device Identifier (BREANNA): Not available    Catheter flush: Heparin (1000 units/mL)    Closure    A sterile dressing was applied.    Access site closure technique: Tissue adhesive    Catheter securement technique: Non-absorbable suture    Radiation  Dose    Fluoroscopy time ( minutes): 1.4    Reference air kerma ( mGy): 8    Kerma area product ( uGy-m2): 220.46    Additional Details    Additional description of procedure: None    Equipment details: None    Specimens removed: None    Estimated blood loss (mL): Less than 10    Standardized report: [email or web address]    Attestation    Signer name: Jefferson Hernandez    I attest that I was present for the entire procedure. I reviewed the stored images and agree with the report as written.  Impression: Insertion of right -sided supradiaphragmatic dual- lumen tunneled dialysis catheter, with tip in the expected location of the right atrium.    Plan:    The catheter may be used immediately.    _______________________________________________________________    Electronically signed by: Jefferson Hernandez  Date:    01/17/2023  Time:    11:26      Labs and Imaging within the last 24 hours listed above were reviewed.

## 2023-01-20 PROBLEM — Z75.8 DISCHARGE PLANNING ISSUES: Status: RESOLVED | Noted: 2023-01-19 | Resolved: 2023-01-20

## 2023-01-20 PROBLEM — Z02.9 DISCHARGE PLANNING ISSUES: Status: RESOLVED | Noted: 2023-01-19 | Resolved: 2023-01-20

## 2023-01-20 LAB
APTT BLDCRRT: 32.1 SEC (ref 21–32)
APTT BLDCRRT: 63.2 SEC (ref 21–32)
APTT BLDCRRT: 78.2 SEC (ref 21–32)
BASOPHILS # BLD AUTO: 0.06 K/UL (ref 0–0.2)
BASOPHILS NFR BLD: 0.4 % (ref 0–1.9)
BLD PROD TYP BPU: NORMAL
BLOOD UNIT EXPIRATION DATE: NORMAL
BLOOD UNIT TYPE CODE: 7300
BLOOD UNIT TYPE: NORMAL
CODING SYSTEM: NORMAL
DIFFERENTIAL METHOD: ABNORMAL
DISPENSE STATUS: NORMAL
EOSINOPHIL # BLD AUTO: 0.1 K/UL (ref 0–0.5)
EOSINOPHIL NFR BLD: 0.7 % (ref 0–8)
ERYTHROCYTE [DISTWIDTH] IN BLOOD BY AUTOMATED COUNT: 16.5 % (ref 11.5–14.5)
HCT VFR BLD AUTO: 30.4 % (ref 37–48.5)
HGB BLD-MCNC: 9.2 G/DL (ref 12–16)
IMM GRANULOCYTES # BLD AUTO: 0.34 K/UL (ref 0–0.04)
IMM GRANULOCYTES NFR BLD AUTO: 2 % (ref 0–0.5)
LYMPHOCYTES # BLD AUTO: 4 K/UL (ref 1–4.8)
LYMPHOCYTES NFR BLD: 23.4 % (ref 18–48)
MCH RBC QN AUTO: 28.1 PG (ref 27–31)
MCHC RBC AUTO-ENTMCNC: 30.3 G/DL (ref 32–36)
MCV RBC AUTO: 93 FL (ref 82–98)
MONOCYTES # BLD AUTO: 1.9 K/UL (ref 0.3–1)
MONOCYTES NFR BLD: 11.4 % (ref 4–15)
NEUTROPHILS # BLD AUTO: 10.5 K/UL (ref 1.8–7.7)
NEUTROPHILS NFR BLD: 62.1 % (ref 38–73)
NRBC BLD-RTO: 0 /100 WBC
NUM UNITS TRANS PACKED RBC: NORMAL
PLATELET # BLD AUTO: 344 K/UL (ref 150–450)
PMV BLD AUTO: 11 FL (ref 9.2–12.9)
RBC # BLD AUTO: 3.27 M/UL (ref 4–5.4)
VANCOMYCIN SERPL-MCNC: 18.1 UG/ML
WBC # BLD AUTO: 16.88 K/UL (ref 3.9–12.7)

## 2023-01-20 PROCEDURE — 97535 SELF CARE MNGMENT TRAINING: CPT

## 2023-01-20 PROCEDURE — 99233 SBSQ HOSP IP/OBS HIGH 50: CPT | Mod: 95,,, | Performed by: INTERNAL MEDICINE

## 2023-01-20 PROCEDURE — 63600175 PHARM REV CODE 636 W HCPCS: Performed by: INTERNAL MEDICINE

## 2023-01-20 PROCEDURE — 97110 THERAPEUTIC EXERCISES: CPT | Mod: CQ

## 2023-01-20 PROCEDURE — 63600175 PHARM REV CODE 636 W HCPCS: Performed by: HOSPITALIST

## 2023-01-20 PROCEDURE — 25000003 PHARM REV CODE 250: Performed by: HOSPITALIST

## 2023-01-20 PROCEDURE — 85730 THROMBOPLASTIN TIME PARTIAL: CPT | Mod: 91 | Performed by: INTERNAL MEDICINE

## 2023-01-20 PROCEDURE — 99233 PR SUBSEQUENT HOSPITAL CARE,LEVL III: ICD-10-PCS | Mod: 95,,, | Performed by: INTERNAL MEDICINE

## 2023-01-20 PROCEDURE — 80202 ASSAY OF VANCOMYCIN: CPT | Performed by: INTERNAL MEDICINE

## 2023-01-20 PROCEDURE — 25000003 PHARM REV CODE 250: Performed by: STUDENT IN AN ORGANIZED HEALTH CARE EDUCATION/TRAINING PROGRAM

## 2023-01-20 PROCEDURE — 99232 PR SUBSEQUENT HOSPITAL CARE,LEVL II: ICD-10-PCS | Mod: ,,, | Performed by: PHYSICIAN ASSISTANT

## 2023-01-20 PROCEDURE — 36415 COLL VENOUS BLD VENIPUNCTURE: CPT | Performed by: INTERNAL MEDICINE

## 2023-01-20 PROCEDURE — 25000003 PHARM REV CODE 250

## 2023-01-20 PROCEDURE — 12000002 HC ACUTE/MED SURGE SEMI-PRIVATE ROOM

## 2023-01-20 PROCEDURE — 85025 COMPLETE CBC W/AUTO DIFF WBC: CPT | Performed by: INTERNAL MEDICINE

## 2023-01-20 PROCEDURE — 99232 SBSQ HOSP IP/OBS MODERATE 35: CPT | Mod: ,,, | Performed by: PHYSICIAN ASSISTANT

## 2023-01-20 RX ORDER — HYDROCODONE BITARTRATE AND ACETAMINOPHEN 5; 325 MG/1; MG/1
1 TABLET ORAL ONCE AS NEEDED
Status: COMPLETED | OUTPATIENT
Start: 2023-01-20 | End: 2023-01-20

## 2023-01-20 RX ADMIN — CLONIDINE HYDROCHLORIDE 0.1 MG: 0.1 TABLET ORAL at 05:01

## 2023-01-20 RX ADMIN — ATOVAQUONE 1500 MG: 750 SUSPENSION ORAL at 08:01

## 2023-01-20 RX ADMIN — FUROSEMIDE 40 MG: 40 TABLET ORAL at 08:01

## 2023-01-20 RX ADMIN — QUETIAPINE FUMARATE 12.5 MG: 25 TABLET ORAL at 08:01

## 2023-01-20 RX ADMIN — ACETAMINOPHEN 650 MG: 325 TABLET ORAL at 08:01

## 2023-01-20 RX ADMIN — SODIUM BICARBONATE 650 MG: 650 TABLET ORAL at 08:01

## 2023-01-20 RX ADMIN — SEVELAMER CARBONATE 800 MG: 800 TABLET, FILM COATED ORAL at 04:01

## 2023-01-20 RX ADMIN — LEVOTHYROXINE SODIUM 25 MCG: 25 TABLET ORAL at 05:01

## 2023-01-20 RX ADMIN — Medication 1 TABLET: at 08:01

## 2023-01-20 RX ADMIN — MAGNESIUM OXIDE TAB 400 MG (241.3 MG ELEMENTAL MG) 400 MG: 400 (241.3 MG) TAB at 08:01

## 2023-01-20 RX ADMIN — PREDNISONE 5 MG: 5 TABLET ORAL at 08:01

## 2023-01-20 RX ADMIN — SEVELAMER CARBONATE 800 MG: 800 TABLET, FILM COATED ORAL at 08:01

## 2023-01-20 RX ADMIN — CARVEDILOL 25 MG: 25 TABLET, FILM COATED ORAL at 08:01

## 2023-01-20 RX ADMIN — WHITE PETROLATUM: 1.75 OINTMENT TOPICAL at 08:01

## 2023-01-20 RX ADMIN — HEPARIN SODIUM 14 UNITS/KG/HR: 10000 INJECTION, SOLUTION INTRAVENOUS at 02:01

## 2023-01-20 RX ADMIN — SODIUM BICARBONATE 650 MG: 650 TABLET ORAL at 02:01

## 2023-01-20 RX ADMIN — PANTOPRAZOLE SODIUM 40 MG: 40 TABLET, DELAYED RELEASE ORAL at 04:01

## 2023-01-20 RX ADMIN — SEVELAMER CARBONATE 800 MG: 800 TABLET, FILM COATED ORAL at 12:01

## 2023-01-20 RX ADMIN — HYDROCODONE BITARTRATE AND ACETAMINOPHEN 1 TABLET: 5; 325 TABLET ORAL at 05:01

## 2023-01-20 RX ADMIN — PANTOPRAZOLE SODIUM 40 MG: 40 TABLET, DELAYED RELEASE ORAL at 05:01

## 2023-01-20 NOTE — PROGRESS NOTES
[facility] - Intensive Care ([address] Medicine  Telemedicine Progress Note    Patient Name: Kristin Goodman  MRN: [id number]  Patient Class: IP- Inpatient   Admission Date: 1/9/2023  Length of Stay: 10 days  Attending Physician: Yaquelin Concepcion MD  Primary Care Provider: Lori Hernandez MD      Subjective:     Principal Problem:Bacteremia due to Enterococcus    HPI:  77-year-old  woman with HTN, hypothyroidism, HFpEF, and osteoarthritis and new acute renal failure due to new diagnosis of Microscopic Polyangiitis s/p renal biopsy and requiring hemodialysis is transferred from [facility] for concerns of new bacteremia.     She was hospitalized from 10/17/22-12/13/22 then transferred to [facility].  Microscopic polyangiitis with pulmonary and renal involvement had suffered respiratory failure with diffuse alveolar hemorrhage, gi bleeding, and renal replacement therapy. S/p Rheumatology consultation and pulse IV steroids + plasmapheresis with Transfusion medicine started on Rituximab and Cyclophosphamide.  Continues on Steroid taper for immunosuppression.     More recently earlier this week noted to have a urine culture grow ESBL E.coli Cystitis, started on meropenem, then she had blood cultures from 1/5 and 1/7 that have grown Enterococcus (amp sensitive) vancomycin started today, patient had sluggish HD catheter with dialysis.   Also ED report that patient may have had syncope during HD today.     She is transferred for continued infectious workup and management as well as removal of tunneled HD line for line holiday.       Overview/Hospital Course:  Seen by ID and nephrology. Plan for line holiday and IR consulted.      Interval History: Virtual follow up visit for suspected Bacteremia [R78.81]  Syncope [R55]  Acute renal failure with tubular necrosis [N17.0]  Microscopic polyangiitis [M31.7]  Bacteremia due to Enterococcus [R78.81, B95.2]  Encounter for continuous renal  replacement therapy (CRRT) for acute renal failure [N17.9] present on admission.   This service was provided by telemedicine.    Patient was transferred to [facility] on: 1/19/2023  The patient location is: [id number] A   Admitted 1/9/2023  5:27 PM    The patient is able to provide adequate history. Additional history was obtained from: sibling and chart review.  No significant events overnight reported.  Patient reports complaints R of facial swelling and R throat discomfort with swallowing   Symptoms are increasing in severity since yesterday.     I have reviewed the following on 1/19/2023:     Details     [x]   Lab results  H&H increased appropriately    []   Micro reports     []   Pathology reports     []   Imaging reports     []   Cardiology Procedure reports     []   Outside records/CareEverywhere     []  Independently viewed:       Review of Systems   Constitutional:  Negative for fever.   Respiratory:  Negative for shortness of breath.      Objective:     Vital Signs (Most Recent):  Temp: 99.2 °F (37.3 °C) (01/19/23 1145)  Pulse: 80 (01/19/23 1145)  Resp: 18 (01/19/23 1145)  BP: (!) 162/74 (01/19/23 1145)  SpO2: 98 % (01/19/23 1145)   Vital Signs (24h Range):  Temp:  [99 °F (37.2 °C)-99.2 °F (37.3 °C)] 99.2 °F (37.3 °C)  Pulse:  [71-92] 80  Resp:  [18-20] 18  SpO2:  [92 %-98 %] 98 %  BP: (102-185)/(66-77) 162/74     Weight: 63.2 kg (139 lb 5.3 oz)  Body mass index is 26.33 kg/m².  No intake or output data in the 24 hours ending 01/19/23 1401   Physical Exam  Constitutional:       Appearance: Normal appearance.   Cardiovascular:      Comments: Monitor and/or Vital signs reviewed at time of visit  Pulmonary:      Effort: Pulmonary effort is normal. No accessory muscle usage or respiratory distress.   Neurological:      Mental Status: She is alert. She is not disoriented.   Psychiatric:         Attention and Perception: Attention normal.         Mood and Affect: Affect normal.         Behavior:  Behavior is cooperative.       Significant Labs:  Recent Labs   Lab 08/02/22  1156 12/13/22  0847   HGBA1C 5.5 4.5     Recent Labs   Lab 01/16/23  0532 01/16/23  1159 01/19/23  0311   WBC 14.06*  --  14.55*   HGB 6.7* 6.8* 8.5*   HCT 22.3*  --  27.7*     --  324     Recent Labs   Lab 01/16/23  0532 01/19/23  0311   GRAN 62.0  8.7* 64.2  9.4*   LYMPH 24.3  3.4 20.8  3.0   MONO 10.6  1.5* 11.3  1.6*   EOS 0.1 0.1     Recent Labs   Lab 01/16/23  0532 01/18/23  1727 01/19/23  0311    143 143   K 3.7 3.1* 3.5    106 105   CO2 20* 27 24   BUN 79* 48* 72*   CREATININE 7.0* 3.9* 5.7*   GLU 79 115* 89   CALCIUM 8.4* 8.0* 8.5*   ALBUMIN 2.4* 2.3* 2.4*   PHOS 4.5 2.4* 3.6     Recent Labs   Lab 09/24/22  1120 10/14/22  1638 10/17/22  1200 10/23/22  1822 10/30/22  0426 11/25/22  0255 01/02/23  0438 01/09/23  1931   PROCAL 0.06 0.12  --   --   --   --   --   --    LACTATE 0.7  --  0.9 0.9  --   --   --  1.4   DDIMER  --  1.96*  --   --   --   --   --   --    FERRITIN  --   --   --   --  374* 588* 378*  --      SARS-CoV2 (COVID-19) Qualitative PCR (no units)   Date Value   10/24/2022 Not Detected   02/14/2022 Not Detected   09/25/2020 Not Detected   05/21/2020 Not Detected     SARS-CoV-2 RNA, Amplification, Qual (no units)   Date Value   10/17/2022 Negative     POC Rapid COVID (no units)   Date Value   09/24/2022 Negative       ECG Results              EKG 12-lead (Final result)  Result time 01/10/23 13:19:47      Final result by Interface, Lab In [facility] (01/10/23 13:19:47)                   Narrative:    Test Reason : R55,    Vent. Rate : 074 BPM     Atrial Rate : 074 BPM     P-R Int : 120 ms          QRS Dur : 068 ms      QT Int : 376 ms       P-R-T Axes : 053 045 034 degrees     QTc Int : 417 ms    Normal sinus rhythm  Normal ECG  When compared with ECG of 12-DEC-2022 07:34,  Criteria for Septal infarct are no longer Present  Confirmed by Dieudonne Rice MD ([id number]) on 1/10/2023 1:19:39 PM    Referred By:  [email or web address]   SELF           Confirmed By:Dieudonne Rice MD                                    Results for orders placed during the hospital encounter of 01/09/23    Echo    Interpretation Summary  · Normal systolic function.  · The estimated ejection fraction is 60%.  · Grade II left ventricular diastolic dysfunction.  · Small circumferential pericardial effusion.  · Moderate to severe left atrial enlargement.  · Mild mitral regurgitation.  · Mild to moderate tricuspid regurgitation.  · Normal right ventricular size with normal right ventricular systolic function.  · The quantitatively derived ejection fraction is 56%.  · Normal central venous pressure (3 mmHg).  · The estimated PA systolic pressure is 36 mmHg.  · No obvious vegitations.      IR Tunneled Catheter Insert w/o Port  Narrative: EXAMINATION:  Tunneled central venous catheter placement    Procedural Personnel    Attending physician(s): David    Fellow physician(s): None    Resident physician(s): None    Advanced practice provider(s): None    Pre-procedure diagnosis: ESRD    Post-procedure diagnosis: Same    Indication: Performance of hemodialysis    Additional clinical history: None    Complications: No immediate complications.    TECHNIQUE:  - Venous access with ultrasound guidance    - Tunneled central venous catheter insertion with fluoroscopic guidance    FINDINGS:  Pre-procedure    History and imaging of central venous access reviewed (QCDR): Yes    Consent: Informed consent for the procedure was obtained and time-out was performed prior to the procedure.    Prophylactic antibiotic administered: More than 1 hour from procedure start time  or more than 2 hours for vancomycin or fluoroquinolones    Preparation (MIPS): The site was prepared and draped using all elements of maximal sterile barrier technique including sterile gloves, sterile gown, cap, mask, large sterile sheet, sterile ultrasound probe cover, hand hygiene and cutaneous antisepsis with  2% chlorhexidine.    Medical reason for site preparation exception (MIPS): Not applicable    Anesthesia/sedation    Level of anesthesia/sedation: Moderate sedation (conscious sedation)    Anesthesia/sedation administered by: Independent trained observer under attending supervision with continuous monitoring of the patient's level of consciousness and physiologic status    Total intra-service sedation time (minutes): 15    Access    Local anesthesia was administered. The vessel was sonographically evaluated and determined to be patent. Real time ultrasound was used to visualize needle entry into the vessel and a permanent image was stored.    Vein accessed: Internal jugular vein    Access technique: Micropuncture set with 21 gauge needle    Venography    Indication for venography: Not performed    Catheter tip position for venography: Not applicable    Venous segment imaged: Not applicable    Not applicable    Catheter placement    An incision was made near the venous access site and the catheter was tunneled subcutaneously to the venous access site . The catheter was advanced via a peel-away sheath into the vein under fluoroscopic guidance. Catheter tip location was fluoroscopically verified and a permanent image was stored.    Catheter placed: Duo glide    Catheter size (Turkmen): 14.5    Catheter cuff-to-tip or trimmed length (cm): 19    Catheter tip position: 2.5 VBUs below remy.    Unique Device Identifier (BREANNA): Not available    Catheter flush: Heparin (1000 units/mL)    Closure    A sterile dressing was applied.    Access site closure technique: Tissue adhesive    Catheter securement technique: Non-absorbable suture    Radiation Dose    Fluoroscopy time ( minutes): 1.4    Reference air kerma ( mGy): 8    Kerma area product ( uGy-m2): 220.46    Additional Details    Additional description of procedure: None    Equipment details: None    Specimens removed: None    Estimated blood loss (mL): Less than  10    Standardized report: [email or web address]    Attestation    Signer name: Jefferson Hernandez    I attest that I was present for the entire procedure. I reviewed the stored images and agree with the report as written.  Impression: Insertion of right -sided supradiaphragmatic dual- lumen tunneled dialysis catheter, with tip in the expected location of the right atrium.    Plan:    The catheter may be used immediately.    _______________________________________________________________    Electronically signed by: Jefferson Hernandez  Date:    01/17/2023  Time:    11:26      Labs and Imaging within the last 24 hours listed above were reviewed.         Assessment/Plan:      * Bacteremia due to Enterococcus  Positive blood cxs 1/5, 1/7, 1/9. Received 1 gm IV vancomycin at LTAC on 1/10.   HD tunelled cath removed 1/10.  Also has midline from LTAC - removed. Surface echo - no vegetation.  -infectious disease consultation appreciated  -last surveillance bcxs 1/11/23 NGTD  -continue Vanc  -CT abd/pelvis with oral contrast per ID recs - negative  -Per ID - 2-weeks vanc from negative bcxs/line removal - may need extension of antibiotics if DVT present    Acute renal failure on dialysis  -due to Microscopic Polyangiitis  -HD on hold due to line holiday. HDS, euvolemic, labs reviewed - no emergent indication for HD today  -bcxs ng x 48 hours, ID is ok with hd cath insertion.   - IR consulted. S/p permcath placement  on 1/17/23   -Nephrology following.  Plans for hemodialysis delayed due to access issues.  -US of RUE to check for DVT    Pauci-immune vasculitis  Continue steroid tape    Microscopic polyangiitis  -continue steroid taper   -patient is on OI prophylaxis with atovaquone 1500mg po daily  -continued on protonix 40mg po bid while on glucocorticoids.     Urinary tract infection due to extended-spectrum beta lactamase (ESBL) producing Escherichia coli  Urine culture from 1/05 with ESBL E.coli   -Ertapenem renal dosing 500mg  IV q24 ordered, completed 5 days (1/5-1/10). Missed dose on 1/9 due to transfer to hospital    Chronic diastolic heart failure  Has preserved EF   -HD was primary volume maintenance   -continue Lasix 40mg po daily - consider higher or more frequent doses are required during her LIne holiday     Primary hypertension  Continue carvedilol  -home lisinopril is on hold due to her WILFREDO    Primary pauci-immune necrotizing and crescentic glomerulonephritis  Patient with acute kidney injury likely due to microscopic polyangiits with granulomatosis (MPA) WILFREDO is currently stable. Labs reviewed- Renal function/electrolytes with Estimated Creatinine Clearance: 7.3 mL/min (A) (based on SCr of 5.7 mg/dL (H)). according to latest data. Monitor urine output and serial BMP and adjust therapy as needed. Avoid nephrotoxins and renally dose meds for GFR listed above.  Had complex course with DAH, requiring pulse dose steroids, plasmapheresis and then rituximab and cyclophosphamide    -continue prednisone taper  -continue atovaquone for PJP ppx  -will need outpatient rheum follow up    Anemia due to chronic kidney disease  Has required transfusions during recent inpatient/LTAC stays   -was receiving EPO infusion at nephrology direction.   Hb 6.7 on 01/16.  Ordered a unit of blood.  Consent obtained.  Continue to monitor CBC.  Goal for transfusion hemoglobin less than 7.    Hypothyroid  Continue levothyroxine 25mcg     Syncope  Report of possible syncope with HD,  Dizzy spells noted per LTAC notes - pt s/p MRI brain on 12/8 w/o acute findings   -neurology prior eval has recommended PT/OT for vestibular therapy  -Future outpatient cardiology visit for possible tilt-table eval.   -refer to neurology at discharge for BPPV f/u     Dysphagia  Continue renal diet     Gastric reflux  Continue BID PPI    Impaired mobility  OT/PT - consider Rehab    Discharge planning   Diet: Diet renal  Significant LDAs:   IV Access Type: Peripheral and Dialysis  Access  Urinary Catheter Indication if present: Patient Does Not Have Urinary Catheter  Other Lines/Tubes/Drains:     Goals of Care:    Previous admission:  12/13/22  Code Status: Full Code  Likely prognosis:  Fair  Advance Care Planning   Date: 01/19/2023  -  Patient wishes to continue curative therapies and return to previous level of function.    ANTHONY: 1/20/2023     Code Status: Full Code   Is the patient medically ready for discharge?: No    Reason for patient still in hospital (select all that apply): Patient trending condition, Imaging, Consult recommendations and Pending disposition  Discharge Plan A: Skilled Nursing Facility   Discharge Delays: (!) Other (did not get succesful HD 1/18/23)    HIGH RISK CONDITION(S):   Patient has a condition that poses threat to life and bodily function: Acute Renal Failure     The amount of time spent during, and associated with, this encounter was 55 minutes; the nature of the activities performed were:  Preparing to see the patient, chart review  Obtaining and/or receiving separately obtained history   Performing medically appropriate examination and evaluation  Counseling and educating the patient/family/caregiver  Ordering medications, tests, or procedures  Referring to and communicating with other health care professionals  Documenting clinical information in the EHR  Independently interpreting results  Care coordination        Active Hospital Problems    Diagnosis  POA    *Bacteremia due to Enterococcus [R78.81, B95.2]  Yes     Priority: 1 - High    Acute renal failure on dialysis [N17.9, Z99.2]  Not Applicable     Priority: 1 - High    Pauci-immune vasculitis [I77.6]  Yes     Priority: 2     Microscopic polyangiitis [M31.7]  Yes     Priority: 2     Urinary tract infection due to extended-spectrum beta lactamase (ESBL) producing Escherichia coli [N39.0, B96.29, Z16.12]  Yes     Priority: 3     Chronic diastolic heart failure [I50.32]  Yes     Priority: 5      Primary hypertension [I10]  Yes     Priority: 5     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, N05.7]  Yes     Priority: 6     Anemia due to chronic kidney disease [N18.9, D63.1]  Yes     Priority: 14     Hypothyroid [E03.9]  Yes     Priority: 16     Syncope [R55]  Yes     Priority: 19     Dysphagia [R13.10]  Yes     Priority: 23     Gastric reflux [K21.9]  Yes     Priority: 24     Impaired mobility [Z74.09]  Yes     Priority: 24     Discharge planning  [Z02.9]  Not Applicable     Priority: 25 - Low      Resolved Hospital Problems   No resolved problems to display.       Inpatient Medications Prescribed for Management of Current Problems:     Scheduled Meds:    sodium chloride 0.9%   Intravenous Once    atovaquone  1,500 mg Oral Daily    B-complex with vitamin C  1 tablet Oral Daily    carvediloL  25 mg Oral BID    epoetin hira (PROCRIT) injection  4,000 Units Subcutaneous Every Tues, Thurs, Sat    furosemide  40 mg Oral Daily    levothyroxine  25 mcg Oral Before breakfast    magnesium oxide  400 mg Oral Daily    pantoprazole  40 mg Oral BID AC    predniSONE  5 mg Oral Daily    QUEtiapine  12.5 mg Oral QHS    sevelamer carbonate  800 mg Oral TID WM    sodium bicarbonate  650 mg Oral TID    white petrolatum   Topical (Top) Daily     Continuous Infusions:    heparin (porcine) in D5W       As Needed: sodium chloride, acetaminophen, albuterol-ipratropium, aluminum-magnesium hydroxide, bisacodyL, cloNIDine, heparin (porcine), heparin (PORCINE), heparin (PORCINE), meclizine, melatonin, methocarbamoL, naloxone, ondansetron, prochlorperazine, senna-docusate 8.6-50 mg, simethicone, sodium chloride 0.9%, sodium chloride 0.9%, sodium chloride 0.9%, sucralfate, Pharmacy to dose Vancomycin consult **AND** vancomycin - pharmacy to dose    VTE Risk Mitigation (From admission, onward)         Ordered     heparin 25,000 units in dextrose 5% (100 units/ml) IV bolus from bag -  ADDITIONAL PRN BOLUS - 60 units/kg  As needed (PRN)        Question:  Heparin Infusion Adjustment (DO NOT MODIFY ANSWER)  Answer:  [email or web address]\epic\Images\Pharmacy\HeparinInfusions\heparin LOW INTENSITY nomogram for [facility] [id number]    01/19/23 2201     heparin 25,000 units in dextrose 5% (100 units/ml) IV bolus from bag - ADDITIONAL PRN BOLUS - 30 units/kg  As needed (PRN)        Question:  Heparin Infusion Adjustment (DO NOT MODIFY ANSWER)  Answer:  [email or web address]\epic\Images\Pharmacy\HeparinInfusions\heparin LOW INTENSITY nomogram for [facility] [id number]    01/19/23 2201     heparin 25,000 units in dextrose 5% 250 mL (100 units/mL) infusion LOW INTENSITY nomogram - [facility]  Continuous        Question Answer Comment   Heparin Infusion Adjustment (DO NOT MODIFY ANSWER) [email or web address]\epic\Images\Pharmacy\HeparinInfusions\heparin LOW INTENSITY nomogram for [facility] [id number]    Begin at (in units/kg/hr) 12        01/19/23 2201     heparin (porcine) injection 1,000 Units  As needed (PRN)         01/17/23 1421     heparin (porcine) injection 1,000 Units  As needed (PRN)         01/09/23 2330     Place sequential compression device  Until discontinued         01/09/23 2330              I have completed this tele-visit with the assistance of a telepresenter.    The attending portion of this evaluation, treatment, and documentation was performed per Yaquelin Concepcion MD via Telemedicine AudioVisual using the secure Pantry software platform with 2 way audio/video. The provider was located off-site and the patient is located in the hospital. The aforementioned video software was utilized to document the relevant history and physical exam. Secure eCareManager software platform was used instead of Pantry for this visit.      Yaquelin Concepcion MD  Department of Hospital Medicine   [facility] Intensive Care ([place]-[address])

## 2023-01-20 NOTE — ASSESSMENT & PLAN NOTE
Has preserved EF   -HD was primary volume maintenance   -continue Lasix 40mg po daily - consider higher or more frequent doses are required during her LIne holiday

## 2023-01-20 NOTE — PROGRESS NOTES
[facility] ([place] [address])  Nephrology  Progress Note    Patient Name: Kristin Goodman  MRN: [id number]  Admission Date: 1/9/2023  Hospital Length of Stay: 11 days  Attending Provider: Yaquelin Concepcion MD   Primary Care Physician: Lori Hernandez MD  Principal Problem:Bacteremia due to Enterococcus    Subjective:     HPI: Ms Goodman is a 77-year-old woman with hypertension, hypothyroidism, HFpEF (most recent TTE with EF 65% with G1DD), pulmonary hypertension, current ESBL E. coli UTI, osteoarthritis, chronic back pain, DONAVAN and microscopic polyangiitis complicated base on biopsy from 10/26 by renal failure, GIB and respiratory failure with history of diffuse alveolar hemorrhage s/p IV Solumedrol, PLEX x5 sessions, Rituximab x4 doses and cyclophosphamide however now  just on steroid taper (cyclophosphamide appears to be hold secondary to anemia) now iHD dependent twice weekly via tunneled HD catheter for metabolic clearance (follows with Dr. Mojica with [facility]) who presented from [facility] for TDC removal in the setting of positive blood cultures growing Enterococcus faecalis and Bacillus species from cultures obtained on 1/5 & 1/7. In addition patient with reported syncopal event and sluggish flows on HD on 1/9 (incomplete session, daughter attributes to iron infusion) with last full session of iHD being on 1/6. She reports still making good urine without any urinary complaints today. Nephrology has been consulted for inpatient HD needs.      Interval History: Patient seen and examined this AM. Complaints of soreness today around site of recent tunneled HD catheter placement. Noted to have occlusive DVT of right internal jugular, subclavian & brachial veins on US with Doppler yesterday for which she has been started on heparin infusion. Afebrile overnight with pulse ranging from 70-60s bpm. Systolic blood pressures ranging from 180-140s mmHg. She is saturating +92% on room air five  unmeasured voids. No RFP ordered today.    Review of patient's allergies indicates:   Allergen Reactions    Ampicillin     Peaches [peach (prunus persica)] Other (See Comments)     Pt unable to state type of reaction. Information obtained from daughter who states she was informed of allergy from patient.    Penicillins      Other reaction(s): Hives, anaphylaxis    Sulfa (sulfonamide antibiotics) Rash and Hives     Current Facility-Administered Medications   Medication Frequency    0.9%  NaCl infusion (for blood administration) Q24H PRN    0.9%  NaCl infusion Once    acetaminophen tablet 650 mg Q4H PRN    albuterol-ipratropium 2.5 mg-0.5 mg/3 mL nebulizer solution 3 mL Q4H PRN    aluminum-magnesium hydroxide 200-200 mg/5 mL suspension 30 mL QID PRN    atovaquone 750 mg/5 mL oral liquid 1,500 mg Daily    B-complex with vitamin C tablet 1 tablet Daily    bisacodyL suppository 10 mg Daily PRN    carvediloL tablet 25 mg BID    cloNIDine tablet 0.1 mg Q8H PRN    epoetin hira injection 4,000 Units Every Tues, Thurs, Sat    furosemide tablet 40 mg Daily    heparin (porcine) injection 1,000 Units PRN    heparin 25,000 units in dextrose 5% (100 units/ml) IV bolus from bag - ADDITIONAL PRN BOLUS - 30 units/kg PRN    heparin 25,000 units in dextrose 5% (100 units/ml) IV bolus from bag - ADDITIONAL PRN BOLUS - 60 units/kg PRN    heparin 25,000 units in dextrose 5% 250 mL (100 units/mL) infusion LOW INTENSITY nomogram - [facility] Continuous    levothyroxine tablet 25 mcg Before breakfast    magnesium oxide tablet 400 mg Daily    meclizine tablet 25 mg TID PRN    melatonin tablet 6 mg Nightly PRN    methocarbamoL tablet 500 mg TID PRN    naloxone 0.4 mg/mL injection 0.02 mg PRN    ondansetron injection 4 mg Q8H PRN    pantoprazole EC tablet 40 mg BID AC    predniSONE tablet 5 mg Daily    prochlorperazine injection Soln 5 mg Q6H PRN    quetiapine split tablet 12.5 mg QHS    senna-docusate 8.6-50 mg per tablet 1 tablet BID PRN     sevelamer carbonate tablet 800 mg TID WM    simethicone chewable tablet 80 mg QID PRN    sodium bicarbonate tablet 650 mg TID    sodium chloride 0.9% bolus 250 mL 250 mL PRN    sodium chloride 0.9% flush 10 mL PRN    sodium chloride 0.9% flush 10 mL Q6H PRN    sucralfate tablet 1 g TID PRN    vancomycin - pharmacy to dose pharmacy to manage frequency    white petrolatum 41 % ointment Daily     Facility-Administered Medications Ordered in Other Encounters   Medication Frequency    celecoxib capsule 400 mg Once    fentaNYL 50 mcg/mL injection  mcg PRN    LIDOcaine (PF) 10 mg/ml (1%) injection 10 mg Once PRN    LIDOcaine (PF) 10 mg/ml (1%) injection 10 mg Once    midazolam (VERSED) 1 mg/mL injection 0.5-4 mg PRN    ropivacaine 0.2% Nimbus PainPRO Pump infusion 500 ML Continuous       Objective:     Vital Signs (Most Recent):  Temp: 98.5 °F (36.9 °C) (01/20/23 1120)  Pulse: 69 (01/20/23 1120)  Resp: 18 (01/20/23 1120)  BP: (!) 145/65 (01/20/23 1120)  SpO2: 98 % (01/20/23 1120)   Vital Signs (24h Range):  Temp:  [98 °F (36.7 °C)-99.1 °F (37.3 °C)] 98.5 °F (36.9 °C)  Pulse:  [65-76] 69  Resp:  [16-22] 18  SpO2:  [92 %-99 %] 98 %  BP: (143-188)/(63-77) 145/65     Weight: 63.2 kg (139 lb 5.3 oz) (01/18/23 0400)  Body mass index is 26.33 kg/m².  Body surface area is 1.65 meters squared.    I/O last 3 completed shifts:  In: 820 [P.O.:820]  Out: -     Physical Exam  Vitals and nursing note reviewed.   Constitutional:       General: She is awake. She is not in acute distress.     Appearance: Normal appearance. She is overweight. She is not ill-appearing or diaphoretic.   HENT:      Head: Normocephalic and atraumatic.      Right Ear: External ear normal.      Left Ear: External ear normal.      Nose: Nose normal.      Mouth/Throat:      Mouth: Mucous membranes are moist.      Pharynx: Oropharynx is clear. No oropharyngeal exudate or posterior oropharyngeal erythema.   Eyes:      General: No scleral icterus.        Right  eye: No discharge.         Left eye: No discharge.      Extraocular Movements: Extraocular movements intact.      Conjunctiva/sclera: Conjunctivae normal.   Cardiovascular:      Rate and Rhythm: Normal rate.      Heart sounds: Murmur heard.   Systolic murmur is present with a grade of 1/6.     No friction rub. No gallop.      Comments: Bilateral pitting lower extremity edema which extends proximally.  Pulmonary:      Effort: Pulmonary effort is normal. No respiratory distress.      Breath sounds: No wheezing, rhonchi or rales.   Chest:      Comments: Tunneled HD catheter to right chest wall. Tenderness noted.  Abdominal:      General: Bowel sounds are normal. There is no distension.      Palpations: Abdomen is soft.      Tenderness: There is no abdominal tenderness.   Genitourinary:     Comments: Purewick in place.  Musculoskeletal:      Cervical back: Neck supple.      Right lower le+ Pitting Edema present.      Left lower le+ Pitting Edema present.   Skin:     General: Skin is warm and dry.      Capillary Refill: Capillary refill takes less than 2 seconds.      Coloration: Skin is not jaundiced.      Findings: No erythema.   Neurological:      General: No focal deficit present.      Mental Status: She is alert. Mental status is at baseline.      Cranial Nerves: No cranial nerve deficit.   Psychiatric:         Mood and Affect: Mood normal.         Behavior: Behavior normal. Behavior is cooperative.       Significant Labs:  BMP:   Recent Labs   Lab 23  031   GLU 89      K 3.5      CO2 24   BUN 72*   CREATININE 5.7*   CALCIUM 8.5*     CBC:   Recent Labs   Lab 233   WBC 16.88*   RBC 3.27*   HGB 9.2*   HCT 30.4*      MCV 93   MCH 28.1   MCHC 30.3*     CMP:   Recent Labs   Lab 23  0311   GLU 89   CALCIUM 8.5*   ALBUMIN 2.4*      K 3.5   CO2 24      BUN 72*   CREATININE 5.7*     Coagulation:   Recent Labs   Lab 23  2246 23  0413   INR 1.1  --     APTT 33.7* 32.1*     LFTs:   Recent Labs   Lab 01/19/23  0311   ALBUMIN 2.4*     Microbiology Results (last 7 days)       Procedure Component Value Units Date/Time    Blood culture [[id number]] Collected: 01/11/23 0958    Order Status: Completed Specimen: Blood Updated: 01/16/23 1212     Blood Culture, Routine No growth after 5 days.    Narrative:      Collection has been rescheduled by [email or web address] at 01/11/2023 09:23 Reason:   Patient unavailable is a hard stick Suzy [id number]  Collection has been rescheduled by [email or web address] at 01/11/2023 09:23 Reason:   Patient unavailable is a hard stick Suzy [id number]    Blood culture [[id number]] Collected: 01/11/23 0958    Order Status: Completed Specimen: Blood Updated: 01/16/23 1212     Blood Culture, Routine No growth after 5 days.    Narrative:      Collection has been rescheduled by [email or web address] at 01/11/2023 09:23 Reason:   Patient unavailable is a hard stick [place]zy [id number]  Collection has been rescheduled by [email or web address] at 01/11/2023 09:23 Reason:   Patient unavailable is a hard stick Suzy [id number]          Specimen (24h ago, onward)      None          Significant Imaging:  I have reviewed all imagining in the last 24 hours.    Assessment/Plan:     * Bacteremia due to Enterococcus  - management per ID and primary  - repeat blood cultures NGTD    Acute deep vein thrombosis (DVT) of non-extremity vein - subclavian  - management per primary team  - currently on heparin infusion    Anemia due to chronic kidney disease  - goal hemoglobin 10-12,   - most recent iron panel with iron 15, transferrin 138, TIBC 204, 7% saturation and ferritin 378  - transfuse for hemoglobin < 7.0  - defer IV iron in light of bacteremia, currently receiving EPO    Primary pauci-immune necrotizing and crescentic glomerulonephritis  Acute renal failure on dialysis  Microscopic polyangiitis  Miscroscopic polyangiitis with renal (biopsy proven pauci immune necrotizing & crescentic glomerulonephritis) and pulmonary involvement s/p Solumedrol  1,000 mg IV x3 doses, PLEX x5 sessions (10/26/2022, 10/27/2022, 10/29/2022, 10/30/2022, 11/01/2022, 11/02/2022, 11/03/2022), Rituximab 375 mg/m^2 x4 (600 mg) on 10/27/2022, 11/03/2922,11/10/2022,11/172022) with cyclophosphamide 7.5 mg/kg on 10/27/2022 and 11/10/2022 (every 14 days). Now iHD for which she receives HD on one but usually twice weekly for metabolic clearance via tunneled HD catheter roughly x2 a week with last HD Friday (01/06/2023).    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  2)  Necrotizing cresentic glomerulonephritis/[id number] necrotizing cresecentic glomerulonephritis.  3)  Focal acute pyelonephritis.  4)  Mild-to-moderate arterionephrosclerosis    Plan/Recommendations:  - TDC replaced 1/17 however occlusive DVTs on that side involving internal jugular, subclavian and brachial veins which is surmised to be culprit for TDC not working, recommend re-consulting IR for TDC evaluation as may need to be placed on left side  - recommend increasing Lasix to 80 mg PO BID and strict I/Os  - continue sodium bicarbonate 650 mg TID and Renvela 800 mg TIDWM for now  - please ensure daily RFPs and magnesium  - currently on prednisone 5 mg daily with atovaquone 1.5 grams faily for PJP prophylaxis   - renal diet if not NPO  - strict I/Os and daily weights  - renally dose all medications to eGFR  - avoid nephrotoxic agents when feasible (i.e. intra-arterial contrast, NSAIDs, supra-therapeutic vancomycin troughs, etc.)    Primary hypertension  - management per primary team    Hypothyroid  - management per primary team    Thank you for your consult. I will follow-up with patient. Please contact us if you have any additional questions.    Pj Paz,  MD  Nephrology  [facility] ([facility]
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
## 2023-01-20 NOTE — SUBJECTIVE & OBJECTIVE
Interval History: Patient seen and examined this AM. Complaints of soreness today around site of recent tunneled HD catheter placement. Noted to have occlusive DVT of right internal jugular, subclavian & brachial veins on US with Doppler yesterday for which she has been started on heparin infusion. Afebrile overnight with pulse ranging from 70-60s bpm. Systolic blood pressures ranging from 180-140s mmHg. She is saturating +92% on room air five unmeasured voids. No RFP ordered today.    Review of patient's allergies indicates:   Allergen Reactions    Ampicillin     Peaches [peach (prunus persica)] Other (See Comments)     Pt unable to state type of reaction. Information obtained from daughter who states she was informed of allergy from patient.    Penicillins      Other reaction(s): Hives, anaphylaxis    Sulfa (sulfonamide antibiotics) Rash and Hives     Current Facility-Administered Medications   Medication Frequency    0.9%  NaCl infusion (for blood administration) Q24H PRN    0.9%  NaCl infusion Once    acetaminophen tablet 650 mg Q4H PRN    albuterol-ipratropium 2.5 mg-0.5 mg/3 mL nebulizer solution 3 mL Q4H PRN    aluminum-magnesium hydroxide 200-200 mg/5 mL suspension 30 mL QID PRN    atovaquone 750 mg/5 mL oral liquid 1,500 mg Daily    B-complex with vitamin C tablet 1 tablet Daily    bisacodyL suppository 10 mg Daily PRN    carvediloL tablet 25 mg BID    cloNIDine tablet 0.1 mg Q8H PRN    epoetin hira injection 4,000 Units Every Tues, Thurs, Sat    furosemide tablet 40 mg Daily    heparin (porcine) injection 1,000 Units PRN    heparin 25,000 units in dextrose 5% (100 units/ml) IV bolus from bag - ADDITIONAL PRN BOLUS - 30 units/kg PRN    heparin 25,000 units in dextrose 5% (100 units/ml) IV bolus from bag - ADDITIONAL PRN BOLUS - 60 units/kg PRN    heparin 25,000 units in dextrose 5% 250 mL (100 units/mL) infusion LOW INTENSITY nomogram - [facility] Continuous    levothyroxine tablet 25 mcg Before breakfast     magnesium oxide tablet 400 mg Daily    meclizine tablet 25 mg TID PRN    melatonin tablet 6 mg Nightly PRN    methocarbamoL tablet 500 mg TID PRN    naloxone 0.4 mg/mL injection 0.02 mg PRN    ondansetron injection 4 mg Q8H PRN    pantoprazole EC tablet 40 mg BID AC    predniSONE tablet 5 mg Daily    prochlorperazine injection Soln 5 mg Q6H PRN    quetiapine split tablet 12.5 mg QHS    senna-docusate 8.6-50 mg per tablet 1 tablet BID PRN    sevelamer carbonate tablet 800 mg TID WM    simethicone chewable tablet 80 mg QID PRN    sodium bicarbonate tablet 650 mg TID    sodium chloride 0.9% bolus 250 mL 250 mL PRN    sodium chloride 0.9% flush 10 mL PRN    sodium chloride 0.9% flush 10 mL Q6H PRN    sucralfate tablet 1 g TID PRN    vancomycin - pharmacy to dose pharmacy to manage frequency    white petrolatum 41 % ointment Daily     Facility-Administered Medications Ordered in Other Encounters   Medication Frequency    celecoxib capsule 400 mg Once    fentaNYL 50 mcg/mL injection  mcg PRN    LIDOcaine (PF) 10 mg/ml (1%) injection 10 mg Once PRN    LIDOcaine (PF) 10 mg/ml (1%) injection 10 mg Once    midazolam (VERSED) 1 mg/mL injection 0.5-4 mg PRN    ropivacaine 0.2% [facility] PainPRO Pump infusion 500 ML Continuous       Objective:     Vital Signs (Most Recent):  Temp: 98.5 °F (36.9 °C) (01/20/23 1120)  Pulse: 69 (01/20/23 1120)  Resp: 18 (01/20/23 1120)  BP: (!) 145/65 (01/20/23 1120)  SpO2: 98 % (01/20/23 1120)   Vital Signs (24h Range):  Temp:  [98 °F (36.7 °C)-99.1 °F (37.3 °C)] 98.5 °F (36.9 °C)  Pulse:  [65-76] 69  Resp:  [16-22] 18  SpO2:  [92 %-99 %] 98 %  BP: (143-188)/(63-77) 145/65     Weight: 63.2 kg (139 lb 5.3 oz) (01/18/23 0400)  Body mass index is 26.33 kg/m².  Body surface area is 1.65 meters squared.    I/O last 3 completed shifts:  In: 820 [P.O.:820]  Out: -     Physical Exam  Vitals and nursing note reviewed.   Constitutional:       General: She is awake. She is not in acute distress.      Appearance: Normal appearance. She is overweight. She is not ill-appearing or diaphoretic.   HENT:      Head: Normocephalic and atraumatic.      Right Ear: External ear normal.      Left Ear: External ear normal.      Nose: Nose normal.      Mouth/Throat:      Mouth: Mucous membranes are moist.      Pharynx: Oropharynx is clear. No oropharyngeal exudate or posterior oropharyngeal erythema.   Eyes:      General: No scleral icterus.        Right eye: No discharge.         Left eye: No discharge.      Extraocular Movements: Extraocular movements intact.      Conjunctiva/sclera: Conjunctivae normal.   Cardiovascular:      Rate and Rhythm: Normal rate.      Heart sounds: Murmur heard.   Systolic murmur is present with a grade of 1/6.     No friction rub. No gallop.      Comments: Bilateral pitting lower extremity edema which extends proximally.  Pulmonary:      Effort: Pulmonary effort is normal. No respiratory distress.      Breath sounds: No wheezing, rhonchi or rales.   Chest:      Comments: Tunneled HD catheter to right chest wall. Tenderness noted.  Abdominal:      General: Bowel sounds are normal. There is no distension.      Palpations: Abdomen is soft.      Tenderness: There is no abdominal tenderness.   Genitourinary:     Comments: Purewick in place.  Musculoskeletal:      Cervical back: Neck supple.      Right lower le+ Pitting Edema present.      Left lower le+ Pitting Edema present.   Skin:     General: Skin is warm and dry.      Capillary Refill: Capillary refill takes less than 2 seconds.      Coloration: Skin is not jaundiced.      Findings: No erythema.   Neurological:      General: No focal deficit present.      Mental Status: She is alert. Mental status is at baseline.      Cranial Nerves: No cranial nerve deficit.   Psychiatric:         Mood and Affect: Mood normal.         Behavior: Behavior normal. Behavior is cooperative.       Significant Labs:  BMP:   Recent Labs   Lab 23  0311   GLU  89      K 3.5      CO2 24   BUN 72*   CREATININE 5.7*   CALCIUM 8.5*     CBC:   Recent Labs   Lab 01/20/23  0413   WBC 16.88*   RBC 3.27*   HGB 9.2*   HCT 30.4*      MCV 93   MCH 28.1   MCHC 30.3*     CMP:   Recent Labs   Lab 01/19/23  0311   GLU 89   CALCIUM 8.5*   ALBUMIN 2.4*      K 3.5   CO2 24      BUN 72*   CREATININE 5.7*     Coagulation:   Recent Labs   Lab 01/19/23  2246 01/20/23  0413   INR 1.1  --    APTT 33.7* 32.1*     LFTs:   Recent Labs   Lab 01/19/23  0311   ALBUMIN 2.4*     Microbiology Results (last 7 days)       Procedure Component Value Units Date/Time    Blood culture [[id number]] Collected: 01/11/23 0958    Order Status: Completed Specimen: Blood Updated: 01/16/23 1212     Blood Culture, Routine No growth after 5 days.    Narrative:      Collection has been rescheduled by [email or web address] at 01/11/2023 09:23 Reason:   Patient unavailable is a hard stick Suzy [id number]  Collection has been rescheduled by [email or web address] at 01/11/2023 09:23 Reason:   Patient unavailable is a hard stick Suzy [id number]    Blood culture [[id number]] Collected: 01/11/23 0958    Order Status: Completed Specimen: Blood Updated: 01/16/23 1212     Blood Culture, Routine No growth after 5 days.    Narrative:      Collection has been rescheduled by [email or web address] at 01/11/2023 09:23 Reason:   Patient unavailable is a hard stick Suzy [id number]  Collection has been rescheduled by DNM1 at 01/11/2023 09:23 Reason:   Patient unavailable is a hard stick Suzy [id number]          Specimen (24h ago, onward)      None          Significant Imaging:  I have reviewed all imagining in the last 24 hours.

## 2023-01-20 NOTE — ASSESSMENT & PLAN NOTE
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 Now iHD for which she receives HD on one but usually twice weekly for metabolic clearance via tunneled HD catheter roughly x2 a week with last HD Friday (01/06/2023).    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  3)  Focal acute pyelonephritis.  4)  Mild-to-moderate arterionephrosclerosis    Plan/Recommendations:  - TDC replaced 1/17 however occlusive DVTs on that side involving internal jugular, subclavian and brachial veins which is surmised to be culprit for TDC not working, recommend re-consulting IR for TDC evaluation as may need to be placed on left side  - continue sodium bicarbonate 650 mg TID and Renvela 800 mg TIDWM for now  - please ensure daily RFPs and magnesium  - currently on prednisone 5 mg daily with atovaquone 1.5 grams faily for PJP prophylaxis   - renal diet if not NPO  - strict I/Os and daily weights  - renally dose all medications to eGFR  - avoid nephrotoxic agents when feasible (i.e. intra-arterial contrast, NSAIDs, supra-therapeutic vancomycin troughs, etc.)

## 2023-01-20 NOTE — PT/OT/SLP PROGRESS
Occupational Therapy   Treatment    Name: Kristin Goodman  MRN: [id number]  Admitting Diagnosis:  Bacteremia due to Enterococcus       Recommendations:     Discharge Recommendations: rehabilitation facility  Discharge Equipment Recommendations:  walker, rolling, bedside commode  Barriers to discharge:  None    Assessment:     Kristin Goodman is a 77 y.o. female with a medical diagnosis of Bacteremia due to Enterococcus.  She presents with deficits in self-care tasks as well as mobility and endurance. Pain noted in neck region on this date. Pt. Pleasant and cooperative during session. Pt. Required CGA for sit to stand transfers from bedside chair and bedside commode. Pt. And sister Receptive to education on technique  as well as placing BSC over toilet to increase height and provide surface for pt. To push up from. Patient would benefit from continued OT services to maximize level of safety and independence with self-care tasks.    Performance deficits affecting function are weakness, impaired endurance, impaired self care skills, impaired functional mobility, pain.     Rehab Prognosis:  Good; patient would benefit from acute skilled OT services to address these deficits and reach maximum level of function.       Plan:     Patient to be seen 3 x/week to address the above listed problems via self-care/home management, therapeutic activities, therapeutic exercises, neuromuscular re-education  Plan of Care Expires: 01/20/23  Plan of Care Reviewed with: patient, sibling    Subjective   Pt. Reported that her neck hurt some.   Pain/Comfort:  Pain Rating 1:  (did not rate)  Location 1: neck  Pain Addressed 1: Distraction  Pain Rating Post-Intervention 1:  (no increases in pain noted)    Objective:     Communicated with: nurse prior to session.  Patient found up in chair with peripheral IV upon OT entry to room. Sister present    General Precautions: Standard, fall    Orthopedic Precautions:N/A  Braces: N/A  Respiratory Status:  Room air     Occupational Performance:     Bed Mobility:    Not tested as pt. Was up in chair     Functional Mobility/Transfers:  Patient completed Sit <> Stand Transfer with contact guard assistance  with  rolling walker  and CGA for proper hand placement and technique  Functional Mobility: pt. Ambulated to and from the bathroom with RW and SBA    Activities of Daily Living:  Not performed other than practicing toileting transfers from bedside commode over toilet      [facility] 6 Click ADL: 20    Treatment & Education:  Pt. And sister educated on transfers and proper technique    Patient left up in chair with all lines intact, call button in reach, and sister present    GOALS:   Multidisciplinary Problems       Occupational Therapy Goals          Problem: Occupational Therapy    Goal Priority Disciplines Outcome Interventions   Occupational Therapy Goal     OT, PT/OT Ongoing, Progressing    Description: Goals to be met by: 1/17/2023 (1 week)  Goals remain appropriate  and extended to 01 31--23    Patient will increase functional independence with ADLs by performing:    UE Dressing with P[place].  LE Dressing with P[place].  Grooming while standing at sink with P[place].  Toileting from toilet with P[place] for hygiene and clothing management.   Step transfer with Pinal  Toilet transfer to toilet with P[place].                         Time Tracking:     OT Date of Treatment: 01/20/23  OT Start Time: 1327  OT Stop Time: 1342  OT Total Time (min): 15 min    Billable Minutes:Self Care/Home Management 15    OT/SARAHI: OT          1/20/2023

## 2023-01-20 NOTE — PLAN OF CARE
01/20/23 1622   Post-Acute Status   Post-Acute Authorization Placement   Post-Acute Placement Status Referrals Sent   Coverage PHN   Discharge Delays None known at this time   Discharge Plan   Discharge Plan A Skilled Nursing Facility     Per Heydi at [facility], if this pt has an accepthing facility and is medically ready,she doesn't qualify to stay inpatient.  Her auth expires next Wednesday.    ALIZA ToussaintN, BS, RN, CCM

## 2023-01-20 NOTE — PROGRESS NOTES
Pharmacokinetic Assessment Follow Up: IV Vancomycin    Vancomycin serum concentration assessment(s)/plan:     The random level resulted as 18.1 mcg/mL (~31 hrs post dose) and can be used to guide therapy at this time. The measurement is within the desired definitive target range of 15 to 20 mcg/mL(for E.Faecalis bacteremia).  ESRD on HD (twice weekly MF outpatient).  Last HD 1/18, however HD discontinued early after initiation due to increased arterial and venous chamber pressure; IR to assess tunneled HD line as it was not working.  Nephro following.  Will not dose Vancomycin today and collect random level with AM labs on 1/21/23.  Will redose if level falls below goal of 15-20 mcg/mL or if plan for HD.   Follow up Nephro recs for future HD plan.    Drug levels (last 3 results):  Recent Labs   Lab Result Units 01/18/23  0246 01/19/23  0311 01/20/23  0413   Vancomycin, Random ug/mL 15.4 21.4 18.1       Pharmacy will continue to follow and monitor vancomycin.    Please contact pharmacy at extension [phone] for questions regarding this assessment.    Thank you for the consult,   Angelica Stone       Patient brief summary:  Kristin Goodman is a 77 y.o. female initiated on antimicrobial therapy with IV Vancomycin for treatment of bacteremia    The patient's current regimen is pulse dosing.    Drug Allergies:   Review of patient's allergies indicates:   Allergen Reactions    Ampicillin     Peaches [peach (prunus persica)] Other (See Comments)     Pt unable to state type of reaction. Information obtained from daughter who states she was informed of allergy from patient.    Penicillins      Other reaction(s): Hives, anaphylaxis    Sulfa (sulfonamide antibiotics) Rash and Hives       Actual Body Weight:   63.2 kg    Renal Function:   Estimated Creatinine Clearance: 7.3 mL/min (A) (based on SCr of 5.7 mg/dL (H)).,     Dialysis Method (if applicable):  intermittent HD    CBC (last 72 hours):  Recent Labs   Lab Result Units  01/19/23  0311 01/20/23  0413   WBC K/uL 14.55* 16.88*   Hemoglobin g/dL 8.5* 9.2*   Hematocrit % 27.7* 30.4*   Platelets K/uL 324 344   Gran % % 64.2 62.1   Lymph % % 20.8 23.4   Mono % % 11.3 11.4   Eosinophil % % 0.8 0.7   Basophil % % 0.3 0.4   Differential Method  Automated Automated       Metabolic Panel (last 72 hours):  Recent Labs   Lab Result Units 01/18/23  1727 01/19/23  0311   Sodium mmol/L 143 143   Potassium mmol/L 3.1* 3.5   Chloride mmol/L 106 105   CO2 mmol/L 27 24   Glucose mg/dL 115* 89   BUN mg/dL 48* 72*   Creatinine mg/dL 3.9* 5.7*   Albumin g/dL 2.3* 2.4*   Phosphorus mg/dL 2.4* 3.6       Vancomycin Administrations:  vancomycin given in the last 96 hours                     vancomycin (VANCOCIN) 500 mg in dextrose 5 % in water (D5W) 5 % 100 mL IVPB (MB+) (mg) 500 mg New Bag 01/18/23 2105                    Microbiologic Results:  Microbiology Results (last 7 days)       Procedure Component Value Units Date/Time    Blood culture [[id number]] Collected: 01/11/23 0958    Order Status: Completed Specimen: Blood Updated: 01/16/23 1212     Blood Culture, Routine No growth after 5 days.    Narrative:      Collection has been rescheduled by DNM1 at 01/11/2023 09:23 Reason:   Patient unavailable is a hard stick Suzy [id number]  Collection has been rescheduled by DNM1 at 01/11/2023 09:23 Reason:   Patient unavailable is a hard stick Suzy [id number]    Blood culture [[id number]] Collected: 01/11/23 0958    Order Status: Completed Specimen: Blood Updated: 01/16/23 1212     Blood Culture, Routine No growth after 5 days.    Narrative:      Collection has been rescheduled by DNM1 at 01/11/2023 09:23 Reason:   Patient unavailable is a hard stick Suzy [id number]  Collection has been rescheduled by DNM1 at 01/11/2023 09:23 Reason:   Patient unavailable is a hard stick Suzy [id number]

## 2023-01-20 NOTE — NURSING
Pt back in room from xray. Pt sitting up in chair. Pt instructed to call for assistance. Will con poc

## 2023-01-20 NOTE — ASSESSMENT & PLAN NOTE
Diet: Diet renal  Significant LDAs:   IV Access Type: Peripheral and Dialysis Access  Urinary Catheter Indication if present: Patient Does Not Have Urinary Catheter  Other Lines/Tubes/Drains:     Goals of Care:    Previous admission:  12/13/22  Code Status: Full Code  Likely prognosis:  Fair  Advance Care Planning   Date: 01/19/2023  -  Patient wishes to continue curative therapies and return to previous level of function.     ANTHONY: 1/20/2023     Code Status: Full Code   Is the patient medically ready for discharge?: No    Reason for patient still in hospital (select all that apply): Patient trending condition, Imaging, Consult recommendations and Pending disposition  Discharge Plan A: Skilled Nursing Facility   Discharge Delays: (!) Other (did not get succesful HD 1/18/23)    HIGH RISK CONDITION(S):   Patient has a condition that poses threat to life and bodily function: Acute Renal Failure     The amount of time spent during, and associated with, this encounter was 55 minutes; the nature of the activities performed were:  Preparing to see the patient, chart review  Obtaining and/or receiving separately obtained history   Performing medically appropriate examination and evaluation  Counseling and educating the patient/family/caregiver  Ordering medications, tests, or procedures  Referring to and communicating with other health care professionals  Documenting clinical information in the EHR  Independently interpreting results  Care coordination

## 2023-01-20 NOTE — PT/OT/SLP PROGRESS
"Physical Therapy Treatment    Patient Name:  Kristin Goodman   MRN:  [id number]    Recommendations:     Discharge Recommendations: rehabilitation facility  Discharge Equipment Recommendations: walker, rolling, bedside commode  Barriers to discharge:  Evolving Clinical Presentation    Assessment:     Kristin Goodman is a 77 y.o. female admitted with a medical diagnosis of Bacteremia due to Enterococcus.  She presents with the following impairments/functional limitations: weakness, impaired endurance, impaired self care skills, impaired functional mobility, pain. Pt initially declined PT and stated "I don't feel well to walk now" Pt then agreed to performing therapeutic exercises in bedside chair. Pt perform exercises at a slow pace and displays weakness and fatigue. Pt will cont to benefit from skilled acute PT to improve mobility and and strength.    Rehab Prognosis: Good; patient would benefit from acute skilled PT services to address these deficits and reach maximum level of function.    Recent Surgery: * No surgery found *      Plan:     During this hospitalization, patient to be seen 3 x/week to address the identified rehab impairments via gait training, therapeutic activities, therapeutic exercises, neuromuscular re-education and progress toward the following goals:    Plan of Care Expires:  02/09/23    Subjective     Chief Complaint: "I don't feel good"  Patient/Family Comments/goals: Pt's sister stated she is having lightheadedness and dizziness again which occurred earlier in her ilnness"  Pain/Comfort:  Pain Rating 1:  (not rated)  Location - Side 1: Bilateral  Location - Orientation 1: generalized  Location 1: neck      Objective:     Communicated with nurse prior to session.  Patient found up in chair with peripheral IV upon PT entry to room.     General Precautions: Standard, fall  Orthopedic Precautions: N/A  Braces: N/A  Respiratory Status: Room air     Functional Mobility:  Pt declined functional mobility " due to feeling unwell (dizzy and lightheaded)      AM-PAC 6 CLICK MOBILITY  Turning over in bed (including adjusting bedclothes, sheets and blankets)?: 3  Sitting down on and standing up from a chair with arms (e.g., wheelchair, bedside commode, etc.): 3  Moving from lying on back to sitting on the side of the bed?: 3  Moving to and from a bed to a chair (including a wheelchair)?: 3  Need to walk in hospital room?: 3  Climbing 3-5 steps with a railing?: 2  Basic Mobility Total Score: 17       Treatment & Education:  Therapeutic exercises: marching, LAQ's, and AP's x 10 reps AROM x 10 reps  Pt was educated on importance of pacing activities and performing exercises 3x/day to maintain mobility to BLE.    Patient left up in chair with all lines intact, call button in reach, nurse notified, and sister present..    GOALS:   Multidisciplinary Problems       Physical Therapy Goals          Problem: Physical Therapy    Goal Priority Disciplines Outcome Goal Variances Interventions   Physical Therapy Goal     PT, PT/OT Ongoing, Progressing     Description: Goals to be met by: 23     Patient will increase functional independence with mobility by performin. Sit to stand transfer with Supervision  2. Gait  x 150 feet with Supervision using Rolling Walker.   3. Stand for 8 minutes with Supervision using Rolling Walker                         Time Tracking:     PT Received On: 23  PT Start Time: 1202     PT Stop Time: 1212  PT Total Time (min): 10 min     Billable Minutes: Therapeutic Exercise 10    Treatment Type: Treatment  PT/PTA: PTA     PTA Visit Number: 1     2023

## 2023-01-20 NOTE — PLAN OF CARE
Problem: Adult Inpatient Plan of Care  Goal: Plan of Care Review  Outcome: Ongoing, Progressing  Goal: Patient-Specific Goal (Individualized)  Outcome: Ongoing, Progressing  Goal: Absence of Hospital-Acquired Illness or Injury  Outcome: Ongoing, Progressing  Goal: Optimal Comfort and Wellbeing  Outcome: Ongoing, Progressing  Goal: Readiness for Transition of Care  Outcome: Ongoing, Progressing     Problem: Infection  Goal: Absence of Infection Signs and Symptoms  Outcome: Ongoing, Progressing     Problem: Fluid and Electrolyte Imbalance (Acute Kidney Injury/Impairment)  Goal: Fluid and Electrolyte Balance  Outcome: Ongoing, Progressing     Heparin gtt initiated for dvt diagnosis. Norco ordered for pain. Poc reviewed with pt. Pt ambulates with assistance of walker. She is able to make needs known to staff. Free of injury or falls. Safety measures maintained.

## 2023-01-20 NOTE — SUBJECTIVE & OBJECTIVE
Interval History: Virtual follow up visit for suspected Bacteremia [R78.81]  Syncope [R55]  Acute renal failure with tubular necrosis [N17.0]  Microscopic polyangiitis [M31.7]  Bacteremia due to Enterococcus [R78.81, B95.2]  Encounter for continuous renal replacement therapy (CRRT) for acute renal failure [N17.9] present on admission.   This service was provided by telemedicine.    Patient was transferred to [facility] on: 1/19/2023  The patient location is: [facility]– [place][note 2[place] A   Admitted 1/9/2023  5:27 PM    The patient is able to provide adequate history. Additional history was obtained from: sibling, daughter, and chart review.  No significant events overnight reported.  Patient reports complaints of dizziness and vasovagal-like episode this AM while in bathroom. Able to talk during episode. Has had similar episodes during previous admission. Also notes great toe pain. Findings of DVTs while being treated for bactermia discussed with ID; no need to extend therapy.  Symptoms are increasing in severity since yesterday.     I have reviewed the following on 1/20/2023:     Details     [x]   Lab results  H&H stable    []   Micro reports     []   Pathology reports     []   Imaging reports     []   Cardiology Procedure reports     []   Outside records/CareEverywhere     []  Independently viewed:       Review of Systems   Constitutional:  Negative for fever.   Respiratory:  Negative for shortness of breath.      Objective:     Vital Signs (Most Recent):  Temp: 98.5 °F (36.9 °C) (01/20/23 1120)  Pulse: 69 (01/20/23 1120)  Resp: 18 (01/20/23 1120)  BP: (!) 145/65 (01/20/23 1120)  SpO2: 98 % (01/20/23 1120)   Vital Signs (24h Range):  Temp:  [98 °F (36.7 °C)-99.1 °F (37.3 °C)] 98.5 °F (36.9 °C)  Pulse:  [65-76] 69  Resp:  [16-22] 18  SpO2:  [92 %-99 %] 98 %  BP: (143-188)/(63-77) 145/65     Weight: 63.2 kg (139 lb 5.3 oz)  Body mass index is 26.33 kg/m².    Intake/Output Summary (Last 24 hours) at 1/20/2023  1217  Last data filed at 1/20/2023 0537  Gross per 24 hour   Intake 340 ml   Output --   Net 340 ml        Physical Exam  Constitutional:       Appearance: Normal appearance. She is ill-appearing.   Cardiovascular:      Comments: Monitor and/or Vital signs reviewed at time of visit  Pulmonary:      Effort: Pulmonary effort is normal. No accessory muscle usage or respiratory distress.   Neurological:      Mental Status: She is alert. She is not disoriented.   Psychiatric:         Attention and Perception: Attention normal.         Mood and Affect: Affect normal.         Behavior: Behavior is cooperative.       Significant Labs:  Recent Labs   Lab 08/02/22  1156 12/13/22  0847   HGBA1C 5.5 4.5       Recent Labs   Lab 01/16/23  0532 01/16/23  1159 01/19/23  0311 01/20/23  0413   WBC 14.06*  --  14.55* 16.88*   HGB 6.7* 6.8* 8.5* 9.2*   HCT 22.3*  --  27.7* 30.4*     --  324 344       Recent Labs   Lab 01/16/23  0532 01/19/23  0311 01/20/23  0413   GRAN 62.0  8.7* 64.2  9.4* 62.1  10.5*   LYMPH 24.3  3.4 20.8  3.0 23.4  4.0   MONO 10.6  1.5* 11.3  1.6* 11.4  1.9*   EOS 0.1 0.1 0.1       Recent Labs   Lab 01/16/23  0532 01/18/23  1727 01/19/23  0311    143 143   K 3.7 3.1* 3.5    106 105   CO2 20* 27 24   BUN 79* 48* 72*   CREATININE 7.0* 3.9* 5.7*   GLU 79 115* 89   CALCIUM 8.4* 8.0* 8.5*   ALBUMIN 2.4* 2.3* 2.4*   PHOS 4.5 2.4* 3.6       Recent Labs   Lab 09/24/22  1120 10/14/22  1638 10/17/22  1200 10/23/22  1822 10/30/22  0426 11/25/22  0255 01/02/23  0438 01/09/23  1931   PROCAL 0.06 0.12  --   --   --   --   --   --    LACTATE 0.7  --  0.9 0.9  --   --   --  1.4   DDIMER  --  1.96*  --   --   --   --   --   --    FERRITIN  --   --   --   --  374* 588* 378*  --        SARS-CoV2 (COVID-19) Qualitative PCR (no units)   Date Value   10/24/2022 Not Detected   02/14/2022 Not Detected   09/25/2020 Not Detected   05/21/2020 Not Detected     SARS-CoV-2 RNA, Amplification, Qual (no units)   Date  Value   10/17/2022 Negative     POC Rapid COVID (no units)   Date Value   09/24/2022 Negative       ECG Results              EKG 12-lead (Final result)  Result time 01/10/23 13:19:47      Final result by Interface, Lab In [facility] (01/10/23 13:19:47)                   Narrative:    Test Reason : R55,    Vent. Rate : 074 BPM     Atrial Rate : 074 BPM     P-R Int : 120 ms          QRS Dur : 068 ms      QT Int : 376 ms       P-R-T Axes : 053 045 034 degrees     QTc Int : 417 ms    Normal sinus rhythm  Normal ECG  When compared with ECG of 12-DEC-2022 07:34,  Criteria for Septal infarct are no longer Present  Confirmed by Dieudonne Rice MD ([id number]) on 1/10/2023 1:19:39 PM    Referred By: [email or web address]   SELF           Confirmed By:Dieudonne Rice MD                                    Results for orders placed during the hospital encounter of 01/09/23    Echo    Interpretation Summary  · Normal systolic function.  · The estimated ejection fraction is 60%.  · Grade II left ventricular diastolic dysfunction.  · Small circumferential pericardial effusion.  · Moderate to severe left atrial enlargement.  · Mild mitral regurgitation.  · Mild to moderate tricuspid regurgitation.  · Normal right ventricular size with normal right ventricular systolic function.  · The quantitatively derived ejection fraction is 56%.  · Normal central venous pressure (3 mmHg).  · The estimated PA systolic pressure is 36 mmHg.  · No obvious vegitations.      US Upper Extremity Veins Right  Narrative: EXAMINATION:  US UPPER EXTREMITY VEINS RIGHT    CLINICAL HISTORY:  R face, neck, and Arm swelling;    TECHNIQUE:  Duplex and color flow Doppler evaluation and dynamic compression was performed of the right upper extremity veins.    COMPARISON:  None    FINDINGS:  Central veins: Occlusive thrombus of the right internal jugular and subclavian veins.  The axillary vein is patent and free of thrombus.    Arm veins: Occlusive thrombus of the right brachial vein.   The basilic and cephalic veins are patent and compressible.    Contralateral subclavian/internal jugular veins: Nonocclusive thrombus of the left subclavian vein.  The left internal jugular vein is patent and free of thrombus.    Other findings: None.  Impression: Occlusive deep venous thrombosis of the RIGHT internal jugular, subclavian, and brachial veins.    Nonocclusive deep venous thrombosis of the LEFT subclavian vein.    This report was flagged in Epic as abnormal.    This critical information above was relayed by Rolando Hernandez MD  via [employer] secure chat to Yaquelin Concepcion MD on 01/19/2023 at 21:56.    Electronically signed by resident: Rolando Hernandez  Date:    01/19/2023  Time:    21:25    Electronically signed by: Emil Camp  Date:    01/19/2023  Time:    22:02      Labs and Imaging within the last 24 hours listed above were reviewed.

## 2023-01-20 NOTE — ASSESSMENT & PLAN NOTE
-due to Microscopic Polyangiitis  -HD on hold due to line holiday. HDS, euvolemic, labs reviewed - no emergent indication for HD today  -bcxs ng x 48 hours, ID is ok with hd cath insertion.   - IR consulted. S/p permcath placement  on 1/17/23   -Nephrology following.  Plans for hemodialysis delayed due to access issues.  -US of RUE to check for DVT

## 2023-01-20 NOTE — ASSESSMENT & PLAN NOTE
Patient with acute kidney injury likely due to microscopic polyangiits with granulomatosis (MPA) WILFREDO is currently stable. Labs reviewed- Renal function/electrolytes with Estimated Creatinine Clearance: 7.3 mL/min (A) (based on SCr of 5.7 mg/dL (H)). according to latest data. Monitor urine output and serial BMP and adjust therapy as needed. Avoid nephrotoxins and renally dose meds for GFR listed above.  Had complex course with DAH, requiring pulse dose steroids, plasmapheresis and then rituximab and cyclophosphamide    -continue prednisone taper  -continue atovaquone for PJP ppx  -will need outpatient rheum follow up

## 2023-01-20 NOTE — ASSESSMENT & PLAN NOTE
Positive blood cxs 1/5, 1/7, 1/9. Received 1 gm IV vancomycin at LTAC on 1/10.   HD tunelled cath removed 1/10.  Also has midline from LTAC - removed. Surface echo - no vegetation.  -infectious disease consultation appreciated  -last surveillance bcxs 1/11/23 NGTD  -continue Vanc  -CT abd/pelvis with oral contrast per ID recs - negative  -Per ID - 2-weeks vanc from negative bcxs/line removal - may need extension of antibiotics if DVT present

## 2023-01-21 PROBLEM — N17.9 AKI (ACUTE KIDNEY INJURY): Status: ACTIVE | Noted: 2023-01-21

## 2023-01-21 LAB
ALBUMIN SERPL BCP-MCNC: 2.4 G/DL (ref 3.5–5.2)
ANION GAP SERPL CALC-SCNC: 15 MMOL/L (ref 8–16)
APTT BLDCRRT: 35.6 SEC (ref 21–32)
APTT BLDCRRT: 40.1 SEC (ref 21–32)
APTT BLDCRRT: 43.9 SEC (ref 21–32)
BASOPHILS # BLD AUTO: 0.08 K/UL (ref 0–0.2)
BASOPHILS NFR BLD: 0.5 % (ref 0–1.9)
BUN SERPL-MCNC: 73 MG/DL (ref 8–23)
CALCIUM SERPL-MCNC: 8.9 MG/DL (ref 8.7–10.5)
CHLORIDE SERPL-SCNC: 103 MMOL/L (ref 95–110)
CO2 SERPL-SCNC: 24 MMOL/L (ref 23–29)
CREAT SERPL-MCNC: 5.7 MG/DL (ref 0.5–1.4)
DIFFERENTIAL METHOD: ABNORMAL
EOSINOPHIL # BLD AUTO: 0.1 K/UL (ref 0–0.5)
EOSINOPHIL NFR BLD: 0.8 % (ref 0–8)
ERYTHROCYTE [DISTWIDTH] IN BLOOD BY AUTOMATED COUNT: 16.4 % (ref 11.5–14.5)
EST. GFR  (NO RACE VARIABLE): 7.3 ML/MIN/1.73 M^2
GLUCOSE SERPL-MCNC: 78 MG/DL (ref 70–110)
HCT VFR BLD AUTO: 30.7 % (ref 37–48.5)
HGB BLD-MCNC: 9.1 G/DL (ref 12–16)
IMM GRANULOCYTES # BLD AUTO: 0.51 K/UL (ref 0–0.04)
IMM GRANULOCYTES NFR BLD AUTO: 3.2 % (ref 0–0.5)
LYMPHOCYTES # BLD AUTO: 3.1 K/UL (ref 1–4.8)
LYMPHOCYTES NFR BLD: 19.3 % (ref 18–48)
MCH RBC QN AUTO: 27.8 PG (ref 27–31)
MCHC RBC AUTO-ENTMCNC: 29.6 G/DL (ref 32–36)
MCV RBC AUTO: 94 FL (ref 82–98)
MONOCYTES # BLD AUTO: 1.9 K/UL (ref 0.3–1)
MONOCYTES NFR BLD: 11.6 % (ref 4–15)
NEUTROPHILS # BLD AUTO: 10.3 K/UL (ref 1.8–7.7)
NEUTROPHILS NFR BLD: 64.6 % (ref 38–73)
NRBC BLD-RTO: 0 /100 WBC
PHOSPHATE SERPL-MCNC: 4.5 MG/DL (ref 2.7–4.5)
PLATELET # BLD AUTO: 357 K/UL (ref 150–450)
PMV BLD AUTO: 10.5 FL (ref 9.2–12.9)
POTASSIUM SERPL-SCNC: 4 MMOL/L (ref 3.5–5.1)
RBC # BLD AUTO: 3.27 M/UL (ref 4–5.4)
SODIUM SERPL-SCNC: 142 MMOL/L (ref 136–145)
VANCOMYCIN SERPL-MCNC: 15.5 UG/ML
WBC # BLD AUTO: 15.98 K/UL (ref 3.9–12.7)

## 2023-01-21 PROCEDURE — 25000003 PHARM REV CODE 250: Performed by: STUDENT IN AN ORGANIZED HEALTH CARE EDUCATION/TRAINING PROGRAM

## 2023-01-21 PROCEDURE — 63600175 PHARM REV CODE 636 W HCPCS: Performed by: STUDENT IN AN ORGANIZED HEALTH CARE EDUCATION/TRAINING PROGRAM

## 2023-01-21 PROCEDURE — 99232 SBSQ HOSP IP/OBS MODERATE 35: CPT | Mod: ,,, | Performed by: INTERNAL MEDICINE

## 2023-01-21 PROCEDURE — 63600175 PHARM REV CODE 636 W HCPCS: Performed by: HOSPITALIST

## 2023-01-21 PROCEDURE — 36415 COLL VENOUS BLD VENIPUNCTURE: CPT | Performed by: STUDENT IN AN ORGANIZED HEALTH CARE EDUCATION/TRAINING PROGRAM

## 2023-01-21 PROCEDURE — 25000003 PHARM REV CODE 250: Performed by: HOSPITALIST

## 2023-01-21 PROCEDURE — 85730 THROMBOPLASTIN TIME PARTIAL: CPT | Mod: 91 | Performed by: STUDENT IN AN ORGANIZED HEALTH CARE EDUCATION/TRAINING PROGRAM

## 2023-01-21 PROCEDURE — 80202 ASSAY OF VANCOMYCIN: CPT | Performed by: INTERNAL MEDICINE

## 2023-01-21 PROCEDURE — 99232 PR SUBSEQUENT HOSPITAL CARE,LEVL II: ICD-10-PCS | Mod: ,,, | Performed by: STUDENT IN AN ORGANIZED HEALTH CARE EDUCATION/TRAINING PROGRAM

## 2023-01-21 PROCEDURE — 63600175 PHARM REV CODE 636 W HCPCS: Mod: JG | Performed by: INTERNAL MEDICINE

## 2023-01-21 PROCEDURE — 12000002 HC ACUTE/MED SURGE SEMI-PRIVATE ROOM

## 2023-01-21 PROCEDURE — 63600175 PHARM REV CODE 636 W HCPCS: Performed by: INTERNAL MEDICINE

## 2023-01-21 PROCEDURE — 25000242 PHARM REV CODE 250 ALT 637 W/ HCPCS: Performed by: STUDENT IN AN ORGANIZED HEALTH CARE EDUCATION/TRAINING PROGRAM

## 2023-01-21 PROCEDURE — 80069 RENAL FUNCTION PANEL: CPT | Performed by: INTERNAL MEDICINE

## 2023-01-21 PROCEDURE — 99232 PR SUBSEQUENT HOSPITAL CARE,LEVL II: ICD-10-PCS | Mod: ,,, | Performed by: INTERNAL MEDICINE

## 2023-01-21 PROCEDURE — 85730 THROMBOPLASTIN TIME PARTIAL: CPT | Performed by: INTERNAL MEDICINE

## 2023-01-21 PROCEDURE — 36415 COLL VENOUS BLD VENIPUNCTURE: CPT | Performed by: INTERNAL MEDICINE

## 2023-01-21 PROCEDURE — 99232 SBSQ HOSP IP/OBS MODERATE 35: CPT | Mod: ,,, | Performed by: STUDENT IN AN ORGANIZED HEALTH CARE EDUCATION/TRAINING PROGRAM

## 2023-01-21 PROCEDURE — 85025 COMPLETE CBC W/AUTO DIFF WBC: CPT | Performed by: INTERNAL MEDICINE

## 2023-01-21 RX ORDER — TIZANIDINE 4 MG/1
4 TABLET ORAL 2 TIMES DAILY PRN
Status: DISCONTINUED | OUTPATIENT
Start: 2023-01-21 | End: 2023-02-03 | Stop reason: HOSPADM

## 2023-01-21 RX ORDER — METHOCARBAMOL 500 MG/1
500 TABLET, FILM COATED ORAL 3 TIMES DAILY PRN
Status: DISCONTINUED | OUTPATIENT
Start: 2023-01-21 | End: 2023-01-21

## 2023-01-21 RX ORDER — LIDOCAINE 50 MG/G
1 PATCH TOPICAL
Status: DISCONTINUED | OUTPATIENT
Start: 2023-01-21 | End: 2023-02-03 | Stop reason: HOSPADM

## 2023-01-21 RX ORDER — AMLODIPINE BESYLATE 10 MG/1
10 TABLET ORAL DAILY
Status: DISCONTINUED | OUTPATIENT
Start: 2023-01-21 | End: 2023-01-21

## 2023-01-21 RX ORDER — FLUTICASONE PROPIONATE 50 MCG
1 SPRAY, SUSPENSION (ML) NASAL 2 TIMES DAILY
Status: DISCONTINUED | OUTPATIENT
Start: 2023-01-21 | End: 2023-02-03 | Stop reason: HOSPADM

## 2023-01-21 RX ORDER — TRAMADOL HYDROCHLORIDE 50 MG/1
50 TABLET ORAL EVERY 8 HOURS PRN
Status: DISCONTINUED | OUTPATIENT
Start: 2023-01-21 | End: 2023-02-03 | Stop reason: HOSPADM

## 2023-01-21 RX ORDER — LISINOPRIL 20 MG/1
40 TABLET ORAL DAILY
Status: DISCONTINUED | OUTPATIENT
Start: 2023-01-21 | End: 2023-01-21

## 2023-01-21 RX ORDER — HYDRALAZINE HYDROCHLORIDE 25 MG/1
100 TABLET, FILM COATED ORAL 3 TIMES DAILY
Status: DISCONTINUED | OUTPATIENT
Start: 2023-01-21 | End: 2023-01-21

## 2023-01-21 RX ADMIN — MAGNESIUM OXIDE TAB 400 MG (241.3 MG ELEMENTAL MG) 400 MG: 400 (241.3 MG) TAB at 08:01

## 2023-01-21 RX ADMIN — FUROSEMIDE 40 MG: 40 TABLET ORAL at 08:01

## 2023-01-21 RX ADMIN — CARVEDILOL 25 MG: 25 TABLET, FILM COATED ORAL at 08:01

## 2023-01-21 RX ADMIN — PANTOPRAZOLE SODIUM 40 MG: 40 TABLET, DELAYED RELEASE ORAL at 06:01

## 2023-01-21 RX ADMIN — LIDOCAINE 1 PATCH: 50 PATCH CUTANEOUS at 06:01

## 2023-01-21 RX ADMIN — HEPARIN SODIUM 11 UNITS/KG/HR: 10000 INJECTION, SOLUTION INTRAVENOUS at 11:01

## 2023-01-21 RX ADMIN — FLUTICASONE PROPIONATE 50 MCG: 50 SPRAY, METERED NASAL at 11:01

## 2023-01-21 RX ADMIN — SEVELAMER CARBONATE 800 MG: 800 TABLET, FILM COATED ORAL at 08:01

## 2023-01-21 RX ADMIN — FLUTICASONE PROPIONATE 50 MCG: 50 SPRAY, METERED NASAL at 08:01

## 2023-01-21 RX ADMIN — PREDNISONE 5 MG: 5 TABLET ORAL at 08:01

## 2023-01-21 RX ADMIN — HYDRALAZINE HYDROCHLORIDE 100 MG: 25 TABLET, FILM COATED ORAL at 11:01

## 2023-01-21 RX ADMIN — ONDANSETRON 4 MG: 2 INJECTION INTRAMUSCULAR; INTRAVENOUS at 01:01

## 2023-01-21 RX ADMIN — SEVELAMER CARBONATE 800 MG: 800 TABLET, FILM COATED ORAL at 11:01

## 2023-01-21 RX ADMIN — ATOVAQUONE 1500 MG: 750 SUSPENSION ORAL at 08:01

## 2023-01-21 RX ADMIN — WHITE PETROLATUM: 1.75 OINTMENT TOPICAL at 08:01

## 2023-01-21 RX ADMIN — Medication 1 TABLET: at 08:01

## 2023-01-21 RX ADMIN — TRAMADOL HYDROCHLORIDE 50 MG: 50 TABLET, COATED ORAL at 04:01

## 2023-01-21 RX ADMIN — VANCOMYCIN HYDROCHLORIDE 250 MG: 500 INJECTION, POWDER, LYOPHILIZED, FOR SOLUTION INTRAVENOUS at 02:01

## 2023-01-21 RX ADMIN — SEVELAMER CARBONATE 800 MG: 800 TABLET, FILM COATED ORAL at 04:01

## 2023-01-21 RX ADMIN — AMLODIPINE BESYLATE 10 MG: 10 TABLET ORAL at 11:01

## 2023-01-21 RX ADMIN — LEVOTHYROXINE SODIUM 25 MCG: 25 TABLET ORAL at 06:01

## 2023-01-21 RX ADMIN — ERYTHROPOIETIN 4000 UNITS: 4000 INJECTION, SOLUTION INTRAVENOUS; SUBCUTANEOUS at 11:01

## 2023-01-21 RX ADMIN — SODIUM BICARBONATE 650 MG: 650 TABLET ORAL at 05:01

## 2023-01-21 RX ADMIN — QUETIAPINE FUMARATE 12.5 MG: 25 TABLET ORAL at 08:01

## 2023-01-21 RX ADMIN — PANTOPRAZOLE SODIUM 40 MG: 40 TABLET, DELAYED RELEASE ORAL at 04:01

## 2023-01-21 RX ADMIN — SODIUM BICARBONATE 650 MG: 650 TABLET ORAL at 08:01

## 2023-01-21 NOTE — ASSESSMENT & PLAN NOTE
Report of possible syncope with HD,  Dizzy spells noted per LTAC notes - pt s/p MRI brain on 12/8 w/o acute findings   -neurology prior eval has recommended PT/OT for vestibular therapy  -Future outpatient cardiology visit for possible tilt-table eval.   -refer to neurology at discharge for BPPV follow up

## 2023-01-21 NOTE — NURSING
while pending ptt results primary nurse/charge nurse administered prn heparin bolus and bumped heparin from 12 units to 14 units at 1448 from ptt last resulted time at 4:13am.  immediately after noticing that the ptt lab results was still pending pt bumped back to 12 units and notified charge nurse . pt ptt is now therapeutic 63.2. next scheduled ptt is at 10:10pm. Pt sitting up in chair. pt denies nad and none noted. Md notified. Will cont poc

## 2023-01-21 NOTE — ASSESSMENT & PLAN NOTE
- goal hemoglobin 10-12,   - most recent iron panel with iron 15, transferrin 138, TIBC 204, 7% saturation and ferritin 378  - transfuse for hemoglobin < 7.0  - defer IV iron in light of bacteremia, currently receiving EPO

## 2023-01-21 NOTE — SUBJECTIVE & OBJECTIVE
Interval History: Patient seen and examined this AM. Complaints of soreness same as yesterday around site of recent tunneled HD catheter placement. Noted to have occlusive DVT of right internal jugular, subclavian & brachial veins on US with Doppler for which she is on heparin infusion. IR consulted for replacement of catheter, pending procedure timeline. She remains on RA with no new symptoms.     Review of patient's allergies indicates:   Allergen Reactions    Ampicillin     Peaches [peach (prunus persica)] Other (See Comments)     Pt unable to state type of reaction. Information obtained from daughter who states she was informed of allergy from patient.    Penicillins      Other reaction(s): Hives, anaphylaxis    Sulfa (sulfonamide antibiotics) Rash and Hives     Current Facility-Administered Medications   Medication Frequency    0.9%  NaCl infusion (for blood administration) Q24H PRN    0.9%  NaCl infusion Once    acetaminophen tablet 650 mg Q4H PRN    albuterol-ipratropium 2.5 mg-0.5 mg/3 mL nebulizer solution 3 mL Q4H PRN    aluminum-magnesium hydroxide 200-200 mg/5 mL suspension 30 mL QID PRN    amLODIPine tablet 10 mg Daily    atovaquone 750 mg/5 mL oral liquid 1,500 mg Daily    B-complex with vitamin C tablet 1 tablet Daily    bisacodyL suppository 10 mg Daily PRN    carvediloL tablet 25 mg BID    cloNIDine tablet 0.1 mg Q8H PRN    epoetin hira injection 4,000 Units Every Tues, Thurs, Sat    fluticasone propionate 50 mcg/actuation nasal spray 50 mcg BID    furosemide tablet 40 mg Daily    heparin (porcine) injection 1,000 Units PRN    heparin 25,000 units in dextrose 5% (100 units/ml) IV bolus from bag - ADDITIONAL PRN BOLUS - 30 units/kg PRN    heparin 25,000 units in dextrose 5% (100 units/ml) IV bolus from bag - ADDITIONAL PRN BOLUS - 60 units/kg PRN    heparin 25,000 units in dextrose 5% 250 mL (100 units/mL) infusion LOW INTENSITY nomogram - [facility] Continuous    hydrALAZINE tablet 100 mg TID     levothyroxine tablet 25 mcg Before breakfast    LIDOcaine 5 % patch 1 patch Q24H    magnesium oxide tablet 400 mg Daily    meclizine tablet 25 mg TID PRN    melatonin tablet 6 mg Nightly PRN    methocarbamoL tablet 500 mg TID PRN    naloxone 0.4 mg/mL injection 0.02 mg PRN    ondansetron injection 4 mg Q8H PRN    pantoprazole EC tablet 40 mg BID AC    predniSONE tablet 5 mg Daily    prochlorperazine injection Soln 5 mg Q6H PRN    quetiapine split tablet 12.5 mg QHS    senna-docusate 8.6-50 mg per tablet 1 tablet BID PRN    sevelamer carbonate tablet 800 mg TID WM    simethicone chewable tablet 80 mg QID PRN    sodium bicarbonate tablet 650 mg TID    sodium chloride 0.9% bolus 250 mL 250 mL PRN    sodium chloride 0.9% flush 10 mL PRN    sodium chloride 0.9% flush 10 mL Q6H PRN    sucralfate tablet 1 g TID PRN    tiZANidine tablet 4 mg BID PRN    traMADoL tablet 50 mg Q8H PRN    vancomycin - pharmacy to dose pharmacy to manage frequency    white petrolatum 41 % ointment Daily     Facility-Administered Medications Ordered in Other Encounters   Medication Frequency    celecoxib capsule 400 mg Once    fentaNYL 50 mcg/mL injection  mcg PRN    LIDOcaine (PF) 10 mg/ml (1%) injection 10 mg Once PRN    LIDOcaine (PF) 10 mg/ml (1%) injection 10 mg Once    midazolam (VERSED) 1 mg/mL injection 0.5-4 mg PRN    ropivacaine 0.2% [facility] PainPRO Pump infusion 500 ML Continuous       Objective:     Vital Signs (Most Recent):  Temp: 98.3 °F (36.8 °C) (01/21/23 0827)  Pulse: 66 (01/21/23 0827)  Resp: 14 (01/21/23 0827)  BP: (!) 175/74 (01/21/23 0827)  SpO2: (!) 94 % (01/21/23 0827)   Vital Signs (24h Range):  Temp:  [98.3 °F (36.8 °C)-99.8 °F (37.7 °C)] 98.3 °F (36.8 °C)  Pulse:  [62-78] 66  Resp:  [14-18] 14  SpO2:  [94 %-98 %] 94 %  BP: (135-179)/(56-77) 175/74     Weight: 63.2 kg (139 lb 5.3 oz) (01/18/23 0400)  Body mass index is 26.33 kg/m².  Body surface area is 1.65 meters squared.    I/O last 3 completed shifts:  In: 100  [P.O.:100]  Out: -     Physical Exam  Vitals and nursing note reviewed.   Constitutional:       General: She is awake. She is not in acute distress.     Appearance: Normal appearance. She is overweight. She is not ill-appearing or diaphoretic.   HENT:      Head: Normocephalic and atraumatic.      Right Ear: External ear normal.      Left Ear: External ear normal.      Nose: Nose normal.      Mouth/Throat:      Mouth: Mucous membranes are moist.      Pharynx: Oropharynx is clear. No oropharyngeal exudate or posterior oropharyngeal erythema.   Eyes:      General: No scleral icterus.        Right eye: No discharge.         Left eye: No discharge.      Extraocular Movements: Extraocular movements intact.      Conjunctiva/sclera: Conjunctivae normal.   Cardiovascular:      Rate and Rhythm: Normal rate.      Heart sounds: Murmur heard.   Systolic murmur is present with a grade of 1/6.     No friction rub. No gallop.      Comments: Bilateral pitting lower extremity edema which extends proximally.  Pulmonary:      Effort: Pulmonary effort is normal. No respiratory distress.      Breath sounds: No wheezing, rhonchi or rales.   Chest:      Comments: Tunneled HD catheter to right chest wall. Tenderness noted.  Abdominal:      General: Bowel sounds are normal. There is no distension.      Palpations: Abdomen is soft.      Tenderness: There is no abdominal tenderness.   Genitourinary:     Comments: Purewick in place.  Musculoskeletal:      Cervical back: Neck supple.      Right lower le+ Pitting Edema present.      Left lower le+ Pitting Edema present.   Skin:     General: Skin is warm and dry.      Capillary Refill: Capillary refill takes less than 2 seconds.      Coloration: Skin is not jaundiced.      Findings: No erythema.   Neurological:      General: No focal deficit present.      Mental Status: She is alert. Mental status is at baseline.      Cranial Nerves: No cranial nerve deficit.   Psychiatric:         Mood  and Affect: Mood normal.         Behavior: Behavior normal. Behavior is cooperative.       Significant Labs:  BMP:   Recent Labs   Lab 01/21/23  0509   GLU 78      K 4.0      CO2 24   BUN 73*   CREATININE 5.7*   CALCIUM 8.9       CBC:   Recent Labs   Lab 01/21/23  0508   WBC 15.98*   RBC 3.27*   HGB 9.1*   HCT 30.7*      MCV 94   MCH 27.8   MCHC 29.6*         Coagulation:   Recent Labs   Lab 01/19/23  2246 01/20/23  0413 01/20/23  2215   INR 1.1  --   --    APTT 33.7*   < > 78.2*    < > = values in this interval not displayed.         Significant Imaging:  I have reviewed all imagining in the last 24 hours.

## 2023-01-21 NOTE — PROGRESS NOTES
[facility] ([address] Medicine  Telemedicine Progress Note    Patient Name: Kristin Goodman  MRN: [id number]  Patient Class: IP- Inpatient   Admission Date: 1/9/2023  Length of Stay: 11 days  Attending Physician: Yaquelin Concepcion MD  Primary Care Provider: Lori Hernandez MD    Subjective:     Principal Problem:Bacteremia due to Enterococcus    HPI:  77-year-old  woman with HTN, hypothyroidism, HFpEF, and osteoarthritis and new acute renal failure due to new diagnosis of Microscopic Polyangiitis s/p renal biopsy and requiring hemodialysis is transferred from [facility] for concerns of new bacteremia.     She was hospitalized from 10/17/22-12/13/22 then transferred to [facility].  Microscopic polyangiitis with pulmonary and renal involvement had suffered respiratory failure with diffuse alveolar hemorrhage, gi bleeding, and renal replacement therapy. S/p Rheumatology consultation and pulse IV steroids + plasmapheresis with Transfusion medicine started on Rituximab and Cyclophosphamide.  Continues on Steroid taper for immunosuppression.     More recently earlier this week noted to have a urine culture grow ESBL E.coli Cystitis, started on meropenem, then she had blood cultures from 1/5 and 1/7 that have grown Enterococcus (amp sensitive) vancomycin started today, patient had sluggish HD catheter with dialysis.   Also ED report that patient may have had syncope during HD today.     She is transferred for continued infectious workup and management as well as removal of tunneled HD line for line holiday.       Overview/Hospital Course:  Seen by ID and nephrology. Plan for line holiday and IR consulted.      Interval History: Virtual follow up visit for suspected Bacteremia [R78.81]  Syncope [R55]  Acute renal failure with tubular necrosis [N17.0]  Microscopic polyangiitis [M31.7]  Bacteremia due to Enterococcus [R78.81, B95.2]  Encounter for continuous renal replacement  therapy (CRRT) for acute renal failure [N17.9] present on admission.   This service was provided by telemedicine.    Patient was transferred to [facility] on: 1/19/2023  The patient location is: [id number] A   Admitted 1/9/2023  5:27 PM    The patient is able to provide adequate history. Additional history was obtained from: sibling, daughter, and chart review.  No significant events overnight reported.  Patient reports complaints of dizziness and vasovagal-like episode this AM while in bathroom. Able to talk during episode. Has had similar episodes during previous admission. Also notes great toe pain. Findings of DVTs while being treated for bactermia discussed with ID; no need to extend therapy.  Symptoms are increasing in severity since yesterday.     I have reviewed the following on 1/20/2023:     Details     [x]   Lab results  H&H stable    []   Micro reports     []   Pathology reports     []   Imaging reports     []   Cardiology Procedure reports     []   Outside records/CareEverywhere     []  Independently viewed:       Review of Systems   Constitutional:  Negative for fever.   Respiratory:  Negative for shortness of breath.      Objective:     Vital Signs (Most Recent):  Temp: 98.5 °F (36.9 °C) (01/20/23 1120)  Pulse: 69 (01/20/23 1120)  Resp: 18 (01/20/23 1120)  BP: (!) 145/65 (01/20/23 1120)  SpO2: 98 % (01/20/23 1120)   Vital Signs (24h Range):  Temp:  [98 °F (36.7 °C)-99.1 °F (37.3 °C)] 98.5 °F (36.9 °C)  Pulse:  [65-76] 69  Resp:  [16-22] 18  SpO2:  [92 %-99 %] 98 %  BP: (143-188)/(63-77) 145/65     Weight: 63.2 kg (139 lb 5.3 oz)  Body mass index is 26.33 kg/m².    Intake/Output Summary (Last 24 hours) at 1/20/2023 1217  Last data filed at 1/20/2023 0537  Gross per 24 hour   Intake 340 ml   Output --   Net 340 ml        Physical Exam  Constitutional:       Appearance: Normal appearance. She is ill-appearing.   Cardiovascular:      Comments: Monitor and/or Vital signs reviewed at time of  visit  Pulmonary:      Effort: Pulmonary effort is normal. No accessory muscle usage or respiratory distress.   Neurological:      Mental Status: She is alert. She is not disoriented.   Psychiatric:         Attention and Perception: Attention normal.         Mood and Affect: Affect normal.         Behavior: Behavior is cooperative.       Significant Labs:  Recent Labs   Lab 08/02/22  1156 12/13/22  0847   HGBA1C 5.5 4.5       Recent Labs   Lab 01/16/23  0532 01/16/23  1159 01/19/23  0311 01/20/23  0413   WBC 14.06*  --  14.55* 16.88*   HGB 6.7* 6.8* 8.5* 9.2*   HCT 22.3*  --  27.7* 30.4*     --  324 344       Recent Labs   Lab 01/16/23  0532 01/19/23  0311 01/20/23  0413   GRAN 62.0  8.7* 64.2  9.4* 62.1  10.5*   LYMPH 24.3  3.4 20.8  3.0 23.4  4.0   MONO 10.6  1.5* 11.3  1.6* 11.4  1.9*   EOS 0.1 0.1 0.1       Recent Labs   Lab 01/16/23  0532 01/18/23  1727 01/19/23  0311    143 143   K 3.7 3.1* 3.5    106 105   CO2 20* 27 24   BUN 79* 48* 72*   CREATININE 7.0* 3.9* 5.7*   GLU 79 115* 89   CALCIUM 8.4* 8.0* 8.5*   ALBUMIN 2.4* 2.3* 2.4*   PHOS 4.5 2.4* 3.6       Recent Labs   Lab 09/24/22  1120 10/14/22  1638 10/17/22  1200 10/23/22  1822 10/30/22  0426 11/25/22  0255 01/02/23  0438 01/09/23  1931   PROCAL 0.06 0.12  --   --   --   --   --   --    LACTATE 0.7  --  0.9 0.9  --   --   --  1.4   DDIMER  --  1.96*  --   --   --   --   --   --    FERRITIN  --   --   --   --  374* 588* 378*  --        SARS-CoV2 (COVID-19) Qualitative PCR (no units)   Date Value   10/24/2022 Not Detected   02/14/2022 Not Detected   09/25/2020 Not Detected   05/21/2020 Not Detected     SARS-CoV-2 RNA, Amplification, Qual (no units)   Date Value   10/17/2022 Negative     POC Rapid COVID (no units)   Date Value   09/24/2022 Negative       ECG Results              EKG 12-lead (Final result)  Result time 01/10/23 13:19:47      Final result by Interface, Lab [facility] (01/10/23 13:19:47)                   Narrative:     Test Reason : R55,    Vent. Rate : 074 BPM     Atrial Rate : 074 BPM     P-R Int : 120 ms          QRS Dur : 068 ms      QT Int : 376 ms       P-R-T Axes : 053 045 034 degrees     QTc Int : 417 ms    Normal sinus rhythm  Normal ECG  When compared with ECG of 12-DEC-2022 07:34,  Criteria for Septal infarct are no longer Present  Confirmed by Dieudonne Rice MD ([id number]) on 1/10/2023 1:19:39 PM    Referred By: [email or web address]   SELF           Confirmed By:Dieudonne Rice MD                                    Results for orders placed during the hospital encounter of 01/09/23    Echo    Interpretation Summary  · Normal systolic function.  · The estimated ejection fraction is 60%.  · Grade II left ventricular diastolic dysfunction.  · Small circumferential pericardial effusion.  · Moderate to severe left atrial enlargement.  · Mild mitral regurgitation.  · Mild to moderate tricuspid regurgitation.  · Normal right ventricular size with normal right ventricular systolic function.  · The quantitatively derived ejection fraction is 56%.  · Normal central venous pressure (3 mmHg).  · The estimated PA systolic pressure is 36 mmHg.  · No obvious vegitations.      US Upper Extremity Veins Right  Narrative: EXAMINATION:  US UPPER EXTREMITY VEINS RIGHT    CLINICAL HISTORY:  R face, neck, and Arm swelling;    TECHNIQUE:  Duplex and color flow Doppler evaluation and dynamic compression was performed of the right upper extremity veins.    COMPARISON:  None    FINDINGS:  Central veins: Occlusive thrombus of the right internal jugular and subclavian veins.  The axillary vein is patent and free of thrombus.    Arm veins: Occlusive thrombus of the right brachial vein.  The basilic and cephalic veins are patent and compressible.    Contralateral subclavian/internal jugular veins: Nonocclusive thrombus of the left subclavian vein.  The left internal jugular vein is patent and free of thrombus.    Other findings: None.  Impression: Occlusive deep  venous thrombosis of the RIGHT internal jugular, subclavian, and brachial veins.    Nonocclusive deep venous thrombosis of the LEFT subclavian vein.    This report was flagged in Epic as abnormal.    This critical information above was relayed by Rolando Hernandez MD  via [facility] secure chat to Yaquelin Concepcion MD on 01/19/2023 at 21:56.    Electronically signed by resident: Rolando Hernandez  Date:    01/19/2023  Time:    21:25    Electronically signed by: Emil Camp  Date:    01/19/2023  Time:    22:02      Labs and Imaging within the last 24 hours listed above were reviewed.       Assessment/Plan:      * Bacteremia due to Enterococcus  Positive blood cxs 1/5, 1/7, 1/9. Received 1 gm IV vancomycin at LTAC on 1/10.   HD tunelled cath removed 1/10.  Also has midline from LTAC - removed. Surface echo - no vegetation.  -infectious disease consultation appreciated  -last surveillance bcxs 1/11/23 NGTD  -continue Vanc  -CT abd/pelvis with oral contrast per ID recs - negative  -Per ID - 2-weeks vanc from negative bcxs/line removal - end date 1/24    Acute deep vein thrombosis (DVT) of non-extremity vein - subclavian  Heparin drip started 1/19 pending procedures to re-establish HD access.    Acute renal failure on dialysis  -due to Microscopic Polyangiitis  -HD on hold due to line holiday. HDS, euvolemic, labs reviewed - no emergent indication for HD today  -bcxs ng x 48 hours, ID is ok with hd cath insertion.   - IR consulted. S/p permcath placement  on 1/17/23   -Nephrology following.  Plans for hemodialysis delayed due to access issues.  -US of RUE showed DVTs    Pauci-immune vasculitis  Continue steroid tape    Microscopic polyangiitis  -continue steroid taper   -patient is on OI prophylaxis with atovaquone 1500mg po daily  -continued on Protonix 40mg po bid while on glucocorticoids.     Urinary tract infection due to extended-spectrum beta lactamase (ESBL) producing Escherichia coli  Urine culture from 1/05 with ESBL E.coli    -Ertapenem renal dosing 500mg IV q24 ordered, completed 5 days (1/5-1/10). Missed dose on 1/9 due to transfer to hospital    Chronic diastolic heart failure  Has preserved EF   -HD was primary volume maintenance   -continue Lasix 40mg po daily - consider higher or more frequent doses are required during her LIne holiday     Primary hypertension  Continue carvedilol  -home lisinopril is on hold due to her WILFREDO    Primary pauci-immune necrotizing and crescentic glomerulonephritis  Patient with acute kidney injury likely due to microscopic polyangiits with granulomatosis (MPA) WILFREDO is currently stable. Labs reviewed- Renal function/electrolytes with Estimated Creatinine Clearance: 7.3 mL/min (A) (based on SCr of 5.7 mg/dL (H)). according to latest data. Monitor urine output and serial BMP and adjust therapy as needed. Avoid nephrotoxins and renally dose meds for GFR listed above.  Had complex course with DAH, requiring pulse dose steroids, plasmapheresis and then rituximab and cyclophosphamide  -continue prednisone taper  -continue atovaquone for PJP ppx  -will need outpatient Rheum follow up    Anemia due to chronic kidney disease  Has required transfusions during recent inpatient/LTAC stays   -was receiving EPO infusion at nephrology direction.   Hb 6.7 on 01/16.  Ordered a unit of blood.  Consent obtained.  Continue to monitor CBC.  Goal for transfusion hemoglobin less than 7.    Hypothyroid  Continue levothyroxine 25mcg     Syncope  Report of possible syncope with HD,  Dizzy spells noted per LTAC notes - pt s/p MRI brain on 12/8 w/o acute findings   -neurology prior eval has recommended PT/OT for vestibular therapy  -Future outpatient cardiology visit for possible tilt-table eval.   -refer to neurology at discharge for BPPV follow up    Dysphagia  Continue renal diet   Re-consulting SLP    Gastric reflux  Continue BID PPI    Impaired mobility  OT/PT - plan for SNF    Discharge planning   Diet: Diet  renal  Significant LDAs:   IV Access Type: Peripheral and Dialysis Access  Urinary Catheter Indication if present: Patient Does Not Have Urinary Catheter  Other Lines/Tubes/Drains:     Goals of Care:    Previous admission:  12/13/22  Code Status: Full Code  Likely prognosis:  Fair  Advance Care Planning   Date: 01/19/2023  -  Patient wishes to continue curative therapies and return to previous level of function.    ANTHONY: 1/24/2023     Code Status: Full Code   Is the patient medically ready for discharge?: No    Reason for patient still in hospital (select all that apply): Patient trending condition, Imaging, Consult recommendations and Pending disposition  Discharge Plan A: Skilled Nursing Facility   Discharge Delays: None known at this time    HIGH RISK CONDITION(S):   Patient has a condition that poses threat to life and bodily function: Acute Renal Failure     The amount of time spent during, and associated with, this encounter was 50 minutes; the nature of the activities performed were:  Preparing to see the patient, chart review  Obtaining and/or receiving separately obtained history   Performing medically appropriate examination and evaluation  Counseling and educating the patient/family/caregiver  Ordering medications, tests, or procedures  Referring to and communicating with other health care professionals  Documenting clinical information in the EHR  Independently interpreting results  Care coordination        Active Hospital Problems    Diagnosis  POA    *Bacteremia due to Enterococcus [R78.81, B95.2]  Yes     Priority: 1 - High    Acute deep vein thrombosis (DVT) of non-extremity vein - subclavian [I82.90]  No     Priority: 1 - High    Acute renal failure on dialysis [N17.9, Z99.2]  Not Applicable     Priority: 1 - High    Pauci-immune vasculitis [I77.6]  Yes     Priority: 2     Microscopic polyangiitis [M31.7]  Yes     Priority: 2     Urinary tract infection due to extended-spectrum beta lactamase  (ESBL) producing Escherichia coli [N39.0, B96.29, Z16.12]  Yes     Priority: 3     Chronic diastolic heart failure [I50.32]  Yes     Priority: 5     Primary hypertension [I10]  Yes     Priority: 5     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, N05.7]  Yes     Priority: 6     Anemia due to chronic kidney disease [N18.9, D63.1]  Yes     Priority: 14     Hypothyroid [E03.9]  Yes     Priority: 16     Syncope [R55]  Yes     Priority: 19     Dysphagia [R13.10]  Yes     Priority: 23     Gastric reflux [K21.9]  Yes     Priority: 24     Impaired mobility [Z74.09]  Yes     Priority: 24     Discharge planning  [Z02.9]  Not Applicable     Priority: 25 - Low      Resolved Hospital Problems   No resolved problems to display.       Inpatient Medications Prescribed for Management of Current Problems:     Scheduled Meds:    sodium chloride 0.9%   Intravenous Once    atovaquone  1,500 mg Oral Daily    B-complex with vitamin C  1 tablet Oral Daily    carvediloL  25 mg Oral BID    epoetin hira (PROCRIT) injection  4,000 Units Subcutaneous Every Tues, Thurs, Sat    furosemide  40 mg Oral Daily    levothyroxine  25 mcg Oral Before breakfast    magnesium oxide  400 mg Oral Daily    pantoprazole  40 mg Oral BID AC    predniSONE  5 mg Oral Daily    QUEtiapine  12.5 mg Oral QHS    sevelamer carbonate  800 mg Oral TID WM    sodium bicarbonate  650 mg Oral TID    white petrolatum   Topical (Top) Daily     Continuous Infusions:    heparin (porcine) in D5W 12 Units/kg/hr (01/20/23 1500)     As Needed: sodium chloride, acetaminophen, albuterol-ipratropium, aluminum-magnesium hydroxide, bisacodyL, cloNIDine, heparin (porcine), heparin (PORCINE), heparin (PORCINE), meclizine, melatonin, methocarbamoL, naloxone, ondansetron, prochlorperazine, senna-docusate 8.6-50 mg, simethicone, sodium chloride 0.9%, sodium chloride 0.9%, sodium chloride 0.9%, sucralfate, Pharmacy to dose Vancomycin consult  **AND** vancomycin - pharmacy to dose    VTE Risk Mitigation (From admission, onward)         Ordered     heparin 25,000 units in dextrose 5% (100 units/ml) IV bolus from bag - ADDITIONAL PRN BOLUS - 60 units/kg  As needed (PRN)        Question:  Heparin Infusion Adjustment (DO NOT MODIFY ANSWER)  Answer:  [email or web address]\epic\Images\Pharmacy\HeparinInfusions\heparin LOW INTENSITY nomogram for [facility] [id number]    01/19/23 2201     heparin 25,000 units in dextrose 5% (100 units/ml) IV bolus from bag - ADDITIONAL PRN BOLUS - 30 units/kg  As needed (PRN)        Question:  Heparin Infusion Adjustment (DO NOT MODIFY ANSWER)  Answer:  [email or web address]\epic\Images\Pharmacy\HeparinInfusions\heparin LOW INTENSITY nomogram for [facility] [id number]    01/19/23 2201     heparin 25,000 units in dextrose 5% 250 mL (100 units/mL) infusion LOW INTENSITY nomogram - [facility]  Continuous        Question Answer Comment   Heparin Infusion Adjustment (DO NOT MODIFY ANSWER) \\[email or web address]\epic\Images\Pharmacy\HeparinInfusions\heparin LOW INTENSITY nomogram for [facility] [id number]    Begin at (in units/kg/hr) 12        01/19/23 2201     heparin (porcine) injection 1,000 Units  As needed (PRN)         01/17/23 1421     heparin (porcine) injection 1,000 Units  As needed (PRN)         01/09/23 2330     Place sequential compression device  Until discontinued         01/09/23 2330              I have completed this tele-visit without the assistance of a telepresenter.    The attending portion of this evaluation, treatment, and documentation was performed per Yaquelin Concepcion MD via Telemedicine AudioVisual using the secure Vidyo software platform with 2 way audio/video. The provider was located off-site and the patient is located in the hospital. The aforementioned video software was utilized to document the relevant history and physical exam. Secure eCareManager software platform was used instead of Owned it for this visit.      Yaquelin Concepcion MD  Department of  [facility]   [facility] ([address])

## 2023-01-21 NOTE — ASSESSMENT & PLAN NOTE
-due to Microscopic Polyangiitis  -HD on hold due to line holiday. HDS, euvolemic, labs reviewed - no emergent indication for HD today  -bcxs ng x 48 hours, ID is ok with hd cath insertion.   - IR consulted. S/p permcath placement  on 1/17/23   -Nephrology following.  Plans for hemodialysis delayed due to access issues.  -US of RUE showed DVTs

## 2023-01-21 NOTE — ASSESSMENT & PLAN NOTE
Positive blood cxs 1/5, 1/7, 1/9. Received 1 gm IV vancomycin at LTAC on 1/10.   HD tunelled cath removed 1/10.  Also has midline from LTAC - removed. Surface echo - no vegetation.  -infectious disease consultation appreciated  -last surveillance bcxs 1/11/23 NGTD  -continue Vanc  -CT abd/pelvis with oral contrast per ID recs - negative  -Per ID - 2-weeks vanc from negative bcxs/line removal - end date 1/24

## 2023-01-21 NOTE — PROGRESS NOTES
Pharmacokinetic Assessment Follow Up: IV Vancomycin    Vancomycin serum concentration assessment(s):    Vancomycin random=15.5 mcg/mL, drawn appropriately     Vancomycin Regimen Plan:    No HD scheduled so far for today. According to trend, appears patient is still clearing vanc so will give vanc 250mg IV x1 today so she doesn't fall <15 mcg/mL.  Vanc random in the AM, will f/u on HD plans    Drug levels (last 3 results):  Recent Labs   Lab Result Units 01/19/23 0311 01/20/23  0413 01/21/23  0508   Vancomycin, Random ug/mL 21.4 18.1 15.5       Pharmacy will continue to follow and monitor vancomycin.    Thank you for the consult,   Sol Patino, PharmD, [facility]  [id number]     Patient brief summary:  Kristin Goodman is a 77 y.o. female initiated on antimicrobial therapy with IV Vancomycin for treatment of bacteremia    The patient's current regimen is pulse dosing    Actual Body Weight:   63kg    Renal Function:   Estimated Creatinine Clearance: 7.3 mL/min (A) (based on SCr of 5.7 mg/dL (H)).,     Dialysis Method (if applicable):  intermittent HD    CBC (last 72 hours):  Recent Labs   Lab Result Units 01/19/23 0311 01/20/23  0413 01/21/23  0508   WBC K/uL 14.55* 16.88* 15.98*   Hemoglobin g/dL 8.5* 9.2* 9.1*   Hematocrit % 27.7* 30.4* 30.7*   Platelets K/uL 324 344 357   Gran % % 64.2 62.1 64.6   Lymph % % 20.8 23.4 19.3   Mono % % 11.3 11.4 11.6   Eosinophil % % 0.8 0.7 0.8   Basophil % % 0.3 0.4 0.5   Differential Method  Automated Automated Automated       Metabolic Panel (last 72 hours):  Recent Labs   Lab Result Units 01/18/23  1727 01/19/23 0311 01/21/23  0509   Sodium mmol/L 143 143 142   Potassium mmol/L 3.1* 3.5 4.0   Chloride mmol/L 106 105 103   CO2 mmol/L 27 24 24   Glucose mg/dL 115* 89 78   BUN mg/dL 48* 72* 73*   Creatinine mg/dL 3.9* 5.7* 5.7*   Albumin g/dL 2.3* 2.4* 2.4*   Phosphorus mg/dL 2.4* 3.6 4.5       Vancomycin Administrations:  vancomycin given in the last 96 hours                      vancomycin (VANCOCIN) 500 mg in dextrose 5 % in water (D5W) 5 % 100 mL IVPB (MB+) (mg) 500 mg New Bag 01/18/23 2105                    Microbiologic Results:  Microbiology Results (last 7 days)       Procedure Component Value Units Date/Time    Blood culture [[id number]] Collected: 01/11/23 0958    Order Status: Completed Specimen: Blood Updated: 01/16/23 1212     Blood Culture, Routine No growth after 5 days.    Narrative:      Collection has been rescheduled by [email or web address] at 01/11/2023 09:23 Reason:   Patient unavailable is a hard stick Suzy [id number]  Collection has been rescheduled by [email or web address] at 01/11/2023 09:23 Reason:   Patient unavailable is a hard stick Suzy [id number]    Blood culture [[id number]] Collected: 01/11/23 0958    Order Status: Completed Specimen: Blood Updated: 01/16/23 1212     Blood Culture, Routine No growth after 5 days.    Narrative:      Collection has been rescheduled by [email or web address] at 01/11/2023 09:23 Reason:   Patient unavailable is a hard stick Suzy [id number]  Collection has been rescheduled by [email or web address] at 01/11/2023 09:23 Reason:   Patient unavailable is a hard stick Suzy [id number]

## 2023-01-21 NOTE — ASSESSMENT & PLAN NOTE
-due to Microscopic Polyangiitis  -HD on hold due to line holiday. HDS, euvolemic, labs reviewed - no emergent indication for HD today  -bcxs ng x 48 hours, ID is ok with hd cath insertion.   - IR consulted. S/p permcath placement  on 1/17/23   -Nephrology following.  Plans for hemodialysis delayed due to access issues.  -US of UE showed DVTs in R and L UE.   - CXR:The dialysis catheter remains in position  - IR following for possible replacement of cat. Plan not finalized yet

## 2023-01-21 NOTE — PROGRESS NOTES
[facility] ([address] Medicine  Progress Note    Patient Name: Kristin Goodman  MRN: [id number]  Patient Class: IP- Inpatient   Admission Date: 1/9/2023  Length of Stay: 12 days  Attending Physician: Miguel Snider MD  Primary Care Provider: Lori Hernandez MD        Subjective:     Principal Problem:Bacteremia due to Enterococcus        HPI:  77-year-old  woman with HTN, hypothyroidism, HFpEF, and osteoarthritis and new acute renal failure due to new diagnosis of Microscopic Polyangiitis s/p renal biopsy and requiring hemodialysis is transferred from [facility] for concerns of new bacteremia.     She was hospitalized from 10/17/22-12/13/22 then transferred to [facility].  Microscopic polyangiitis with pulmonary and renal involvement had suffered respiratory failure with diffuse alveolar hemorrhage, gi bleeding, and renal replacement therapy. S/p Rheumatology consultation and pulse IV steroids + plasmapheresis with Transfusion medicine started on Rituximab and Cyclophosphamide.  Continues on Steroid taper for immunosuppression.     More recently earlier this week noted to have a urine culture grow ESBL E.coli Cystitis, started on meropenem, then she had blood cultures from 1/5 and 1/7 that have grown Enterococcus (amp sensitive) vancomycin started today, patient had sluggish HD catheter with dialysis.   Also ED report that patient may have had syncope during HD today.     She is transferred for continued infectious workup and management as well as removal of tunneled HD line for line holiday.       Overview/Hospital Course:  Seen by ID and nephrology. Plan for line holiday and IR consulted.      Interval History:  Patient is not having any new complaints today.  Blood pressure noted to be in 170s, resuming home antihypertensives except lisinopril.  Patient noted to have DVTs in bilateral upper extremities which could be likely culprit for TDC malfunctioning.  IR  following.    Review of Systems  Objective:     Vital Signs (Most Recent):  Temp: 98.6 °F (37 °C) (01/21/23 1140)  Pulse: 70 (01/21/23 1140)  Resp: 15 (01/21/23 1140)  BP: (!) 164/67 (01/21/23 1140)  SpO2: 96 % (01/21/23 1140)   Vital Signs (24h Range):  Temp:  [98.3 °F (36.8 °C)-99.8 °F (37.7 °C)] 98.6 °F (37 °C)  Pulse:  [62-78] 70  Resp:  [14-18] 15  SpO2:  [94 %-98 %] 96 %  BP: (135-179)/(56-77) 164/67     Weight: 63.2 kg (139 lb 5.3 oz)  Body mass index is 26.33 kg/m².  No intake or output data in the 24 hours ending 01/21/23 1502   Physical Exam  Vitals reviewed.   Constitutional:       General: She is not in acute distress.     Appearance: Normal appearance. She is not toxic-appearing.   HENT:      Head: Normocephalic and atraumatic.   Eyes:      General: No scleral icterus.  Cardiovascular:      Rate and Rhythm: Normal rate and regular rhythm.      Pulses: Normal pulses.      Heart sounds: No murmur heard.  Pulmonary:      Effort: Pulmonary effort is normal. No respiratory distress.      Breath sounds: No wheezing, rhonchi or rales.   Abdominal:      General: Bowel sounds are normal. There is no distension.      Tenderness: There is no abdominal tenderness. There is no guarding.   Musculoskeletal:      Right lower leg: No edema.      Left lower leg: No edema.   Skin:     General: Skin is warm.   Neurological:      General: No focal deficit present.      Mental Status: She is alert and oriented to person, place, and time. Mental status is at baseline.   Psychiatric:         Behavior: Behavior normal.         Thought Content: Thought content normal.             Assessment/Plan:      * Bacteremia due to Enterococcus  Positive blood cxs 1/5, 1/7, 1/9. Received 1 gm IV vancomycin at LTAC on 1/10.   HD tunelled cath removed 1/10.  Also has midline from LTAC - removed. Surface echo - no vegetation.  -infectious disease consultation appreciated  -last surveillance bcxs 1/11/23 NGTD  -continue Vanc  -CT abd/pelvis  with oral contrast per ID recs - negative  -Per ID - 2-weeks vanc from negative bcxs/line removal - end date 1/24    Acute deep vein thrombosis (DVT) of non-extremity vein - subclavian  Heparin drip started 1/19 pending procedures to re-establish HD access.  Ultrasound bilateral upper extremity t:    Pauci-immune vasculitis  Continue steroid tape    Urinary tract infection due to extended-spectrum beta lactamase (ESBL) producing Escherichia coli  Urine culture from 1/05 with ESBL E.coli   -Ertapenem renal dosing 500mg IV q24 ordered, completed 5 days (1/5-1/10). Missed dose on 1/9 due to transfer to hospital    Anemia due to chronic kidney disease  Has required transfusions during recent inpatient/LTAC stays   -was receiving EPO infusion at nephrology direction.   Hb 6.7 on 01/16.  Ordered a unit of blood.  Consent obtained.  Continue to monitor CBC.  Goal for transfusion hemoglobin less than 7.    Primary pauci-immune necrotizing and crescentic glomerulonephritis  Patient with acute kidney injury likely due to microscopic polyangiits with granulomatosis (MPA) WILFREDO is currently stable. Labs reviewed- Renal function/electrolytes with Estimated Creatinine Clearance: 7.3 mL/min (A) (based on SCr of 5.7 mg/dL (H)). according to latest data. Monitor urine output and serial BMP and adjust therapy as needed. Avoid nephrotoxins and renally dose meds for GFR listed above.  Had complex course with DAH, requiring pulse dose steroids, plasmapheresis and then rituximab and cyclophosphamide  -continue prednisone taper  -continue atovaquone for PJP ppx  -will need outpatient Rheum follow up    Gastric reflux  Continue BID PPI    Dysphagia  Continue renal diet   Re-consulting SLP    Acute renal failure on dialysis  -due to Microscopic Polyangiitis  -HD on hold due to line holiday. HDS, euvolemic, labs reviewed - no emergent indication for HD today  -bcxs ng x 48 hours, ID is ok with hd cath insertion.   - IR consulted. S/p permcath  placement  on 1/17/23   -Nephrology following.  Plans for hemodialysis delayed due to access issues.  -US of UE showed DVTs in R and L UE.   - CXR:The dialysis catheter remains in position  - IR following for possible replacement of cat. Plan not finalized yet     Microscopic polyangiitis  -continue steroid taper   -patient is on OI prophylaxis with atovaquone 1500mg po daily  -continued on Protonix 40mg po bid while on glucocorticoids.     Impaired mobility  OT/PT - plan for SNF    Chronic diastolic heart failure  Has preserved EF   -HD was primary volume maintenance   -continue Lasix 40mg po daily - consider higher or more frequent doses are required during her LIne holiday     Syncope  Report of possible syncope with HD,  Dizzy spells noted per LTAC notes - pt s/p MRI brain on 12/8 w/o acute findings   -neurology prior eval has recommended PT/OT for vestibular therapy  -Future outpatient cardiology visit for possible tilt-table eval.   -refer to neurology at discharge for BPPV follow up    Primary hypertension  Continue carvedilol  -home lisinopril is on hold due to her WILFREDO    Hypothyroid  Continue levothyroxine 25mcg     VTE Risk Mitigation (From admission, onward)         Ordered     heparin 25,000 units in dextrose 5% (100 units/ml) IV bolus from bag - ADDITIONAL PRN BOLUS - 60 units/kg  As needed (PRN)        Question:  Heparin Infusion Adjustment (DO NOT MODIFY ANSWER)  Answer:  \\[email or web address]\epic\Images\Pharmacy\HeparinInfusions\heparin LOW INTENSITY nomogram for [facility] [id number].pdf    01/19/23 2201     heparin 25,000 units in dextrose 5% (100 units/ml) IV bolus from bag - ADDITIONAL PRN BOLUS - 30 units/kg  As needed (PRN)        Question:  Heparin Infusion Adjustment (DO NOT MODIFY ANSWER)  Answer:  \\[email or web address]\epic\Images\Pharmacy\HeparinInfusions\heparin LOW INTENSITY nomogram for [facility] [id number].pdf    01/19/23 2201     heparin 25,000 units in dextrose 5% 250 mL (100 units/mL) infusion LOW INTENSITY nomogram -  [facility]  Continuous        Question Answer Comment   Heparin Infusion Adjustment (DO NOT MODIFY ANSWER) \\[email or web address]\epic\Images\Pharmacy\HeparinInfusions\heparin LOW INTENSITY nomogram for [facility] [id number].pdf    Begin at (in units/kg/hr) 12        01/19/23 2201     heparin (porcine) injection 1,000 Units  As needed (PRN)         01/17/23 1421     heparin (porcine) injection 1,000 Units  As needed (PRN)         01/09/23 2330     Place sequential compression device  Until discontinued         01/09/23 2330                Discharge Planning   ANTHONY: 1/24/2023     Code Status: Full Code   Is the patient medically ready for discharge?: No    Reason for patient still in hospital (select all that apply): Patient trending condition  Discharge Plan A: Skilled Nursing Facility   Discharge Delays: None known at this time              Miguel Snider MD  Department of Hospital Medicine   [facility] - Intensive Care ([place])

## 2023-01-21 NOTE — ASSESSMENT & PLAN NOTE
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 Now iHD for which she receives HD on one but usually twice weekly for metabolic clearance via tunneled HD catheter roughly x2 a week with last HD Friday (01/06/2023).    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  2)  Necrotizing cresentic glomerulonephritis/[id number] necrotizing cresecentic glomerulonephritis.  3)  Focal acute pyelonephritis.  4)  Mild-to-moderate arterionephrosclerosis    Plan/Recommendations:  - TDC replaced 1/17 however occlusive DVTs on that side involving internal jugular, subclavian and brachial veins which is surmised to be culprit for TDC not working, IR re-consulted for TDC evaluation, pending their evaluation/recs for catheter replacement  - likely will need HD over weekend pending catheter evaluation  - continue sodium bicarbonate 650 mg TID and Renvela 800 mg TIDWM for now  - please ensure daily RFPs and magnesium  - currently on prednisone 5 mg daily with atovaquone 1.5 grams faily for PJP prophylaxis   - renal diet if not NPO  - strict I/Os and daily weights  - renally dose all medications to eGFR  - avoid nephrotoxic agents when feasible (i.e. intra-arterial contrast, NSAIDs, supra-therapeutic vancomycin troughs, etc.)

## 2023-01-21 NOTE — SUBJECTIVE & OBJECTIVE
Interval History:  Patient is not having any new complaints today.  Blood pressure noted to be in 170s, resuming home antihypertensives except lisinopril.  Patient noted to have DVTs in bilateral upper extremities which could be likely culprit for TDC malfunctioning.  IR following.    Review of Systems  Objective:     Vital Signs (Most Recent):  Temp: 98.6 °F (37 °C) (01/21/23 1140)  Pulse: 70 (01/21/23 1140)  Resp: 15 (01/21/23 1140)  BP: (!) 164/67 (01/21/23 1140)  SpO2: 96 % (01/21/23 1140)   Vital Signs (24h Range):  Temp:  [98.3 °F (36.8 °C)-99.8 °F (37.7 °C)] 98.6 °F (37 °C)  Pulse:  [62-78] 70  Resp:  [14-18] 15  SpO2:  [94 %-98 %] 96 %  BP: (135-179)/(56-77) 164/67     Weight: 63.2 kg (139 lb 5.3 oz)  Body mass index is 26.33 kg/m².  No intake or output data in the 24 hours ending 01/21/23 1502   Physical Exam  Vitals reviewed.   Constitutional:       General: She is not in acute distress.     Appearance: Normal appearance. She is not toxic-appearing.   HENT:      Head: Normocephalic and atraumatic.   Eyes:      General: No scleral icterus.  Cardiovascular:      Rate and Rhythm: Normal rate and regular rhythm.      Pulses: Normal pulses.      Heart sounds: No murmur heard.  Pulmonary:      Effort: Pulmonary effort is normal. No respiratory distress.      Breath sounds: No wheezing, rhonchi or rales.   Abdominal:      General: Bowel sounds are normal. There is no distension.      Tenderness: There is no abdominal tenderness. There is no guarding.   Musculoskeletal:      Right lower leg: No edema.      Left lower leg: No edema.   Skin:     General: Skin is warm.   Neurological:      General: No focal deficit present.      Mental Status: She is alert and oriented to person, place, and time. Mental status is at baseline.   Psychiatric:         Behavior: Behavior normal.         Thought Content: Thought content normal.

## 2023-01-21 NOTE — PROGRESS NOTES
[facility] ([place] [address])  Nephrology  Progress Note    Patient Name: Kristin Goodman  MRN: [id number]  Admission Date: 1/9/2023  Hospital Length of Stay: 12 days  Attending Provider: Miguel Snider MD   Primary Care Physician: Lori Hernandez MD  Principal Problem:Bacteremia due to Enterococcus    Subjective:     HPI: Ms Goodman is a 77-year-old woman with hypertension, hypothyroidism, HFpEF (most recent TTE with EF 65% with G1DD), pulmonary hypertension, current ESBL E. coli UTI, osteoarthritis, chronic back pain, DONAVAN and microscopic polyangiitis complicated base on biopsy from 10/26 by renal failure, GIB and respiratory failure with history of diffuse alveolar hemorrhage s/p IV Solumedrol, PLEX x5 sessions, Rituximab x4 doses and cyclophosphamide however now  just on steroid taper (cyclophosphamide appears to be hold secondary to anemia) now iHD dependent twice weekly via tunneled HD catheter for metabolic clearance (follows with Dr. Mojica with [facility]) who presented from [facility] for TDC removal in the setting of positive blood cultures growing Enterococcus faecalis and Bacillus species from cultures obtained on 1/5 & 1/7. In addition patient with reported syncopal event and sluggish flows on HD on 1/9 (incomplete session, daughter attributes to iron infusion) with last full session of iHD being on 1/6. She reports still making good urine without any urinary complaints today. Nephrology has been consulted for inpatient HD needs.      Interval History: Patient seen and examined this AM. Complaints of soreness same as yesterday around site of recent tunneled HD catheter placement. Noted to have occlusive DVT of right internal jugular, subclavian & brachial veins on US with Doppler for which she is on heparin infusion. IR consulted for replacement of catheter, pending procedure timeline. She remains on RA with no new symptoms.     Review of patient's allergies indicates:   Allergen  Reactions    Ampicillin     Peaches [peach (prunus persica)] Other (See Comments)     Pt unable to state type of reaction. Information obtained from daughter who states she was informed of allergy from patient.    Penicillins      Other reaction(s): Hives, anaphylaxis    Sulfa (sulfonamide antibiotics) Rash and Hives     Current Facility-Administered Medications   Medication Frequency    0.9%  NaCl infusion (for blood administration) Q24H PRN    0.9%  NaCl infusion Once    acetaminophen tablet 650 mg Q4H PRN    albuterol-ipratropium 2.5 mg-0.5 mg/3 mL nebulizer solution 3 mL Q4H PRN    aluminum-magnesium hydroxide 200-200 mg/5 mL suspension 30 mL QID PRN    amLODIPine tablet 10 mg Daily    atovaquone 750 mg/5 mL oral liquid 1,500 mg Daily    B-complex with vitamin C tablet 1 tablet Daily    bisacodyL suppository 10 mg Daily PRN    carvediloL tablet 25 mg BID    cloNIDine tablet 0.1 mg Q8H PRN    epoetin hira injection 4,000 Units Every Tues, Thurs, Sat    fluticasone propionate 50 mcg/actuation nasal spray 50 mcg BID    furosemide tablet 40 mg Daily    heparin (porcine) injection 1,000 Units PRN    heparin 25,000 units in dextrose 5% (100 units/ml) IV bolus from bag - ADDITIONAL PRN BOLUS - 30 units/kg PRN    heparin 25,000 units in dextrose 5% (100 units/ml) IV bolus from bag - ADDITIONAL PRN BOLUS - 60 units/kg PRN    heparin 25,000 units in dextrose 5% 250 mL (100 units/mL) infusion LOW INTENSITY nomogram - [facility] Continuous    hydrALAZINE tablet 100 mg TID    levothyroxine tablet 25 mcg Before breakfast    LIDOcaine 5 % patch 1 patch Q24H    magnesium oxide tablet 400 mg Daily    meclizine tablet 25 mg TID PRN    melatonin tablet 6 mg Nightly PRN    methocarbamoL tablet 500 mg TID PRN    naloxone 0.4 mg/mL injection 0.02 mg PRN    ondansetron injection 4 mg Q8H PRN    pantoprazole EC tablet 40 mg BID AC    predniSONE tablet 5 mg Daily    prochlorperazine injection Soln 5 mg Q6H PRN    quetiapine split tablet 12.5  mg QHS    senna-docusate 8.6-50 mg per tablet 1 tablet BID PRN    sevelamer carbonate tablet 800 mg TID WM    simethicone chewable tablet 80 mg QID PRN    sodium bicarbonate tablet 650 mg TID    sodium chloride 0.9% bolus 250 mL 250 mL PRN    sodium chloride 0.9% flush 10 mL PRN    sodium chloride 0.9% flush 10 mL Q6H PRN    sucralfate tablet 1 g TID PRN    tiZANidine tablet 4 mg BID PRN    traMADoL tablet 50 mg Q8H PRN    vancomycin - pharmacy to dose pharmacy to manage frequency    white petrolatum 41 % ointment Daily     Facility-Administered Medications Ordered in Other Encounters   Medication Frequency    celecoxib capsule 400 mg Once    fentaNYL 50 mcg/mL injection  mcg PRN    LIDOcaine (PF) 10 mg/ml (1%) injection 10 mg Once PRN    LIDOcaine (PF) 10 mg/ml (1%) injection 10 mg Once    midazolam (VERSED) 1 mg/mL injection 0.5-4 mg PRN    ropivacaine 0.2% [facility] PainPRO Pump infusion 500 ML Continuous       Objective:     Vital Signs (Most Recent):  Temp: 98.3 °F (36.8 °C) (01/21/23 0827)  Pulse: 66 (01/21/23 0827)  Resp: 14 (01/21/23 0827)  BP: (!) 175/74 (01/21/23 0827)  SpO2: (!) 94 % (01/21/23 0827)   Vital Signs (24h Range):  Temp:  [98.3 °F (36.8 °C)-99.8 °F (37.7 °C)] 98.3 °F (36.8 °C)  Pulse:  [62-78] 66  Resp:  [14-18] 14  SpO2:  [94 %-98 %] 94 %  BP: (135-179)/(56-77) 175/74     Weight: 63.2 kg (139 lb 5.3 oz) (01/18/23 0400)  Body mass index is 26.33 kg/m².  Body surface area is 1.65 meters squared.    I/O last 3 completed shifts:  In: 100 [P.O.:100]  Out: -     Physical Exam  Vitals and nursing note reviewed.   Constitutional:       General: She is awake. She is not in acute distress.     Appearance: Normal appearance. She is overweight. She is not ill-appearing or diaphoretic.   HENT:      Head: Normocephalic and atraumatic.      Right Ear: External ear normal.      Left Ear: External ear normal.      Nose: Nose normal.      Mouth/Throat:      Mouth: Mucous membranes are moist.      Pharynx:  Oropharynx is clear. No oropharyngeal exudate or posterior oropharyngeal erythema.   Eyes:      General: No scleral icterus.        Right eye: No discharge.         Left eye: No discharge.      Extraocular Movements: Extraocular movements intact.      Conjunctiva/sclera: Conjunctivae normal.   Cardiovascular:      Rate and Rhythm: Normal rate.      Heart sounds: Murmur heard.   Systolic murmur is present with a grade of 1/6.     No friction rub. No gallop.      Comments: Bilateral pitting lower extremity edema which extends proximally.  Pulmonary:      Effort: Pulmonary effort is normal. No respiratory distress.      Breath sounds: No wheezing, rhonchi or rales.   Chest:      Comments: Tunneled HD catheter to right chest wall. Tenderness noted.  Abdominal:      General: Bowel sounds are normal. There is no distension.      Palpations: Abdomen is soft.      Tenderness: There is no abdominal tenderness.   Genitourinary:     Comments: Purewick in place.  Musculoskeletal:      Cervical back: Neck supple.      Right lower le+ Pitting Edema present.      Left lower le+ Pitting Edema present.   Skin:     General: Skin is warm and dry.      Capillary Refill: Capillary refill takes less than 2 seconds.      Coloration: Skin is not jaundiced.      Findings: No erythema.   Neurological:      General: No focal deficit present.      Mental Status: She is alert. Mental status is at baseline.      Cranial Nerves: No cranial nerve deficit.   Psychiatric:         Mood and Affect: Mood normal.         Behavior: Behavior normal. Behavior is cooperative.       Significant Labs:  BMP:   Recent Labs   Lab 23  0509   GLU 78      K 4.0      CO2 24   BUN 73*   CREATININE 5.7*   CALCIUM 8.9       CBC:   Recent Labs   Lab 23  0508   WBC 15.98*   RBC 3.27*   HGB 9.1*   HCT 30.7*      MCV 94   MCH 27.8   MCHC 29.6*         Coagulation:   Recent Labs   Lab 23  2246 23  0413 23  2215   INR  1.1  --   --    APTT 33.7*   < > 78.2*    < > = values in this interval not displayed.         Significant Imaging:  I have reviewed all imagining in the last 24 hours.    Assessment/Plan:     Urinary tract infection due to extended-spectrum beta lactamase (ESBL) producing Escherichia coli  - Infectious Disease consulted and following  - management per primary team    Anemia due to chronic kidney disease  - goal hemoglobin 10-12,   - most recent iron panel with iron 15, transferrin 138, TIBC 204, 7% saturation and ferritin 378  - transfuse for hemoglobin < 7.0  - defer IV iron in light of bacteremia, currently receiving EPO    Acute renal failure on dialysis  Miscroscopic polyangiitis with renal (biopsy proven pauci immune necrotizing & crescentic glomerulonephritis) and pulmonary involvement s/p Solumedrol 1,000 mg IV x3 doses, PLEX x5 sessions (10/26/2022, 10/27/2022, 10/29/2022, 10/30/2022, 11/01/2022, 11/02/2022, 11/03/2022), Rituximab 375 mg/m^2 x4 (600 mg) on 10/27/2022, 11/03/2922,11/10/2022,11/172022) with cyclophosphamide 7.5 mg/kg on 10/27/2022 and 11/10/2022 (every 14 days). Now iHD for which she receives HD on one but usually twice weekly for metabolic clearance via tunneled HD catheter roughly x2 a week with last HD Friday (01/06/2023).    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  2)  Necrotizing cresentic glomerulonephritis/[id number] necrotizing cresecentic glomerulonephritis.  3)  Focal acute pyelonephritis.  4)  Mild-to-moderate arterionephrosclerosis    Plan/Recommendations:  - TDC replaced 1/17 however occlusive DVTs on that side involving internal jugular, subclavian and brachial veins which is surmised to be culprit for TDC not working, IR  re-consulted for TDC evaluation, pending their evaluation/recs for catheter replacement  - no urgent indications for dialysis today  - continue sodium bicarbonate 650 mg TID and Renvela 800 mg TIDWM for now  - please ensure daily RFPs and magnesium  - currently on prednisone 5 mg daily with atovaquone 1.5 grams faily for PJP prophylaxis   - renal diet if not NPO  - strict I/Os and daily weights  - renally dose all medications to eGFR  - avoid nephrotoxic agents when feasible (i.e. intra-arterial contrast, NSAIDs, supra-therapeutic vancomycin troughs, etc.)        Thank you for your consult. I will follow-up with patient. Please contact us if you have any additional questions.    Jeanne Russo MD  Nephrology  [facility] - Intensive Care ([place] [address])    ATTENDING PHYSICIAN ATTESTATION  I have personally verified the history and examined the patient. I thoroughly reviewed the demographic, clinical, laboratorial and imaging information available in medical records. I agree with the assessment and recommendations provided by the subspecialty resident who was under my supervision.

## 2023-01-21 NOTE — ASSESSMENT & PLAN NOTE
Diet: Diet renal  Significant LDAs:   IV Access Type: Peripheral and Dialysis Access  Urinary Catheter Indication if present: Patient Does Not Have Urinary Catheter  Other Lines/Tubes/Drains:     Goals of Care:    Previous admission:  12/13/22  Code Status: Full Code  Likely prognosis:  Fair  Advance Care Planning   Date: 01/19/2023  -  Patient wishes to continue curative therapies and return to previous level of function.     ANTHONY: 1/24/2023     Code Status: Full Code   Is the patient medically ready for discharge?: No    Reason for patient still in hospital (select all that apply): Patient trending condition, Imaging, Consult recommendations and Pending disposition  Discharge Plan A: Skilled Nursing Facility   Discharge Delays: None known at this time    HIGH RISK CONDITION(S):   Patient has a condition that poses threat to life and bodily function: Acute Renal Failure     The amount of time spent during, and associated with, this encounter was 50 minutes; the nature of the activities performed were:  Preparing to see the patient, chart review  Obtaining and/or receiving separately obtained history   Performing medically appropriate examination and evaluation  Counseling and educating the patient/family/caregiver  Ordering medications, tests, or procedures  Referring to and communicating with other health care professionals  Documenting clinical information in the EHR  Independently interpreting results  Care coordination

## 2023-01-21 NOTE — ASSESSMENT & PLAN NOTE
Heparin drip started 1/19 pending procedures to re-establish HD access.  Ultrasound bilateral upper extremity t:

## 2023-01-22 LAB
ANION GAP SERPL CALC-SCNC: 17 MMOL/L (ref 8–16)
APTT BLDCRRT: 45.1 SEC (ref 21–32)
APTT BLDCRRT: 51.3 SEC (ref 21–32)
BASOPHILS # BLD AUTO: 0.04 K/UL (ref 0–0.2)
BASOPHILS NFR BLD: 0.2 % (ref 0–1.9)
BUN SERPL-MCNC: 76 MG/DL (ref 8–23)
CALCIUM SERPL-MCNC: 9.2 MG/DL (ref 8.7–10.5)
CHLORIDE SERPL-SCNC: 102 MMOL/L (ref 95–110)
CO2 SERPL-SCNC: 26 MMOL/L (ref 23–29)
CREAT SERPL-MCNC: 5.7 MG/DL (ref 0.5–1.4)
DIFFERENTIAL METHOD: ABNORMAL
EOSINOPHIL # BLD AUTO: 0 K/UL (ref 0–0.5)
EOSINOPHIL NFR BLD: 0.1 % (ref 0–8)
ERYTHROCYTE [DISTWIDTH] IN BLOOD BY AUTOMATED COUNT: 16.1 % (ref 11.5–14.5)
EST. GFR  (NO RACE VARIABLE): 7.3 ML/MIN/1.73 M^2
GLUCOSE SERPL-MCNC: 73 MG/DL (ref 70–110)
HCT VFR BLD AUTO: 29.8 % (ref 37–48.5)
HGB BLD-MCNC: 9.3 G/DL (ref 12–16)
IMM GRANULOCYTES # BLD AUTO: 0.51 K/UL (ref 0–0.04)
IMM GRANULOCYTES NFR BLD AUTO: 3 % (ref 0–0.5)
LYMPHOCYTES # BLD AUTO: 3.4 K/UL (ref 1–4.8)
LYMPHOCYTES NFR BLD: 20.2 % (ref 18–48)
MAGNESIUM SERPL-MCNC: 1.9 MG/DL (ref 1.6–2.6)
MCH RBC QN AUTO: 27.9 PG (ref 27–31)
MCHC RBC AUTO-ENTMCNC: 31.2 G/DL (ref 32–36)
MCV RBC AUTO: 90 FL (ref 82–98)
MONOCYTES # BLD AUTO: 1.9 K/UL (ref 0.3–1)
MONOCYTES NFR BLD: 11.3 % (ref 4–15)
NEUTROPHILS # BLD AUTO: 11 K/UL (ref 1.8–7.7)
NEUTROPHILS NFR BLD: 65.2 % (ref 38–73)
NRBC BLD-RTO: 0 /100 WBC
PHOSPHATE SERPL-MCNC: 4.2 MG/DL (ref 2.7–4.5)
PLATELET # BLD AUTO: 392 K/UL (ref 150–450)
PMV BLD AUTO: 11.2 FL (ref 9.2–12.9)
POTASSIUM SERPL-SCNC: 3.9 MMOL/L (ref 3.5–5.1)
RBC # BLD AUTO: 3.33 M/UL (ref 4–5.4)
SODIUM SERPL-SCNC: 145 MMOL/L (ref 136–145)
VANCOMYCIN SERPL-MCNC: 19.4 UG/ML
WBC # BLD AUTO: 16.89 K/UL (ref 3.9–12.7)

## 2023-01-22 PROCEDURE — 99900035 HC TECH TIME PER 15 MIN (STAT)

## 2023-01-22 PROCEDURE — 80048 BASIC METABOLIC PNL TOTAL CA: CPT | Performed by: STUDENT IN AN ORGANIZED HEALTH CARE EDUCATION/TRAINING PROGRAM

## 2023-01-22 PROCEDURE — 63600175 PHARM REV CODE 636 W HCPCS: Performed by: HOSPITALIST

## 2023-01-22 PROCEDURE — 85730 THROMBOPLASTIN TIME PARTIAL: CPT | Mod: 91 | Performed by: STUDENT IN AN ORGANIZED HEALTH CARE EDUCATION/TRAINING PROGRAM

## 2023-01-22 PROCEDURE — 25000003 PHARM REV CODE 250: Performed by: HOSPITALIST

## 2023-01-22 PROCEDURE — 94761 N-INVAS EAR/PLS OXIMETRY MLT: CPT

## 2023-01-22 PROCEDURE — 83735 ASSAY OF MAGNESIUM: CPT | Performed by: STUDENT IN AN ORGANIZED HEALTH CARE EDUCATION/TRAINING PROGRAM

## 2023-01-22 PROCEDURE — 84100 ASSAY OF PHOSPHORUS: CPT | Performed by: STUDENT IN AN ORGANIZED HEALTH CARE EDUCATION/TRAINING PROGRAM

## 2023-01-22 PROCEDURE — 63600175 PHARM REV CODE 636 W HCPCS: Performed by: INTERNAL MEDICINE

## 2023-01-22 PROCEDURE — 92610 EVALUATE SWALLOWING FUNCTION: CPT

## 2023-01-22 PROCEDURE — 80202 ASSAY OF VANCOMYCIN: CPT | Performed by: STUDENT IN AN ORGANIZED HEALTH CARE EDUCATION/TRAINING PROGRAM

## 2023-01-22 PROCEDURE — 85025 COMPLETE CBC W/AUTO DIFF WBC: CPT | Performed by: INTERNAL MEDICINE

## 2023-01-22 PROCEDURE — 99232 PR SUBSEQUENT HOSPITAL CARE,LEVL II: ICD-10-PCS | Mod: ,,, | Performed by: STUDENT IN AN ORGANIZED HEALTH CARE EDUCATION/TRAINING PROGRAM

## 2023-01-22 PROCEDURE — 12000002 HC ACUTE/MED SURGE SEMI-PRIVATE ROOM

## 2023-01-22 PROCEDURE — 94799 UNLISTED PULMONARY SVC/PX: CPT

## 2023-01-22 PROCEDURE — 99900031 HC PATIENT EDUCATION (STAT)

## 2023-01-22 PROCEDURE — 25000003 PHARM REV CODE 250: Performed by: STUDENT IN AN ORGANIZED HEALTH CARE EDUCATION/TRAINING PROGRAM

## 2023-01-22 PROCEDURE — 99232 SBSQ HOSP IP/OBS MODERATE 35: CPT | Mod: ,,, | Performed by: STUDENT IN AN ORGANIZED HEALTH CARE EDUCATION/TRAINING PROGRAM

## 2023-01-22 RX ADMIN — SEVELAMER CARBONATE 800 MG: 800 TABLET, FILM COATED ORAL at 05:01

## 2023-01-22 RX ADMIN — FLUTICASONE PROPIONATE 50 MCG: 50 SPRAY, METERED NASAL at 09:01

## 2023-01-22 RX ADMIN — WHITE PETROLATUM: 1.75 OINTMENT TOPICAL at 10:01

## 2023-01-22 RX ADMIN — PANTOPRAZOLE SODIUM 40 MG: 40 TABLET, DELAYED RELEASE ORAL at 06:01

## 2023-01-22 RX ADMIN — LIDOCAINE 1 PATCH: 50 PATCH CUTANEOUS at 06:01

## 2023-01-22 RX ADMIN — Medication 1 TABLET: at 10:01

## 2023-01-22 RX ADMIN — SODIUM BICARBONATE 650 MG: 650 TABLET ORAL at 02:01

## 2023-01-22 RX ADMIN — QUETIAPINE FUMARATE 12.5 MG: 25 TABLET ORAL at 09:01

## 2023-01-22 RX ADMIN — FLUTICASONE PROPIONATE 50 MCG: 50 SPRAY, METERED NASAL at 10:01

## 2023-01-22 RX ADMIN — SODIUM BICARBONATE 650 MG: 650 TABLET ORAL at 09:01

## 2023-01-22 RX ADMIN — SEVELAMER CARBONATE 800 MG: 800 TABLET, FILM COATED ORAL at 10:01

## 2023-01-22 RX ADMIN — HEPARIN SODIUM 11 UNITS/KG/HR: 10000 INJECTION, SOLUTION INTRAVENOUS at 09:01

## 2023-01-22 RX ADMIN — ATOVAQUONE 1500 MG: 750 SUSPENSION ORAL at 10:01

## 2023-01-22 RX ADMIN — PREDNISONE 5 MG: 5 TABLET ORAL at 10:01

## 2023-01-22 RX ADMIN — SEVELAMER CARBONATE 800 MG: 800 TABLET, FILM COATED ORAL at 12:01

## 2023-01-22 RX ADMIN — PANTOPRAZOLE SODIUM 40 MG: 40 TABLET, DELAYED RELEASE ORAL at 05:01

## 2023-01-22 RX ADMIN — LEVOTHYROXINE SODIUM 25 MCG: 25 TABLET ORAL at 06:01

## 2023-01-22 RX ADMIN — SENNOSIDES AND DOCUSATE SODIUM 1 TABLET: 50; 8.6 TABLET ORAL at 10:01

## 2023-01-22 RX ADMIN — MAGNESIUM OXIDE TAB 400 MG (241.3 MG ELEMENTAL MG) 400 MG: 400 (241.3 MG) TAB at 10:01

## 2023-01-22 RX ADMIN — SODIUM BICARBONATE 650 MG: 650 TABLET ORAL at 10:01

## 2023-01-22 RX ADMIN — FUROSEMIDE 40 MG: 40 TABLET ORAL at 10:01

## 2023-01-22 NOTE — ASSESSMENT & PLAN NOTE
-due to Microscopic Polyangiitis  -HD on hold due to line holiday. HDS, euvolemic, labs reviewed - no emergent indication for HD today  -bcxs ng x 48 hours, ID is ok with hd cath insertion.   - IR consulted. S/p permcath placement  on 1/17/23   -Nephrology following.  Plans for hemodialysis delayed due to access issues.  -US of UE showed DVTs in R and L UE.   - CXR:The dialysis catheter remains in position  - IR following for possible replacement of TDC. Tentatively plan for tomorrow.  NPO midnight.

## 2023-01-22 NOTE — NURSING
Unable to reach venipuncture about APTT draw that was due at 0130. Charge nurse to get in touch with lab supervisor.

## 2023-01-22 NOTE — SUBJECTIVE & OBJECTIVE
Interval History:  Patient does not have any new complaints today.  Discussed with ID, plan to interrogate TDC likely tomorrow.  NPO midnight except meds.    Review of Systems  Objective:     Vital Signs (Most Recent):  Temp: 96 °F (35.6 °C) (01/22/23 1201)  Pulse: 87 (01/22/23 1201)  Resp: 17 (01/22/23 1201)  BP: (!) 173/79 (01/22/23 1201)  SpO2: (!) 91 % (01/22/23 1201)   Vital Signs (24h Range):  Temp:  [96 °F (35.6 °C)-99 °F (37.2 °C)] 96 °F (35.6 °C)  Pulse:  [80-87] 87  Resp:  [15-18] 17  SpO2:  [91 %-95 %] 91 %  BP: (127-173)/(60-79) 173/79     Weight: 63.2 kg (139 lb 5.3 oz)  Body mass index is 26.33 kg/m².    Intake/Output Summary (Last 24 hours) at 1/22/2023 1223  Last data filed at 1/21/2023 1806  Gross per 24 hour   Intake 100 ml   Output --   Net 100 ml      Physical Exam  Vitals reviewed.   Constitutional:       General: She is not in acute distress.     Appearance: Normal appearance. She is not toxic-appearing.   HENT:      Head: Normocephalic and atraumatic.   Eyes:      General: No scleral icterus.  Cardiovascular:      Rate and Rhythm: Normal rate and regular rhythm.      Pulses: Normal pulses.      Heart sounds: No murmur heard.  Pulmonary:      Effort: Pulmonary effort is normal. No respiratory distress.      Breath sounds: No wheezing, rhonchi or rales.   Chest:      Comments: R TDC  Abdominal:      General: Bowel sounds are normal. There is no distension.      Tenderness: There is no abdominal tenderness. There is no guarding.   Musculoskeletal:      Right lower leg: No edema.      Left lower leg: No edema.   Skin:     General: Skin is warm.   Neurological:      General: No focal deficit present.      Mental Status: She is alert and oriented to person, place, and time. Mental status is at baseline.   Psychiatric:         Behavior: Behavior normal.         Thought Content: Thought content normal.

## 2023-01-22 NOTE — PLAN OF CARE
Problem: Adult Inpatient Plan of Care  Goal: Plan of Care Review  Outcome: Ongoing, Progressing  Goal: Patient-Specific Goal (Individualized)  Outcome: Ongoing, Progressing  Goal: Absence of Hospital-Acquired Illness or Injury  Outcome: Ongoing, Progressing  Goal: Optimal Comfort and Wellbeing  Outcome: Ongoing, Progressing  Goal: Readiness for Transition of Care  Outcome: Ongoing, Progressing     Problem: Infection  Goal: Absence of Infection Signs and Symptoms  Outcome: Ongoing, Progressing     Problem: Fluid and Electrolyte Imbalance (Acute Kidney Injury/Impairment)  Goal: Fluid and Electrolyte Balance  Outcome: Ongoing, Progressing     Problem: Oral Intake Inadequate (Acute Kidney Injury/Impairment)  Goal: Optimal Nutrition Intake  Outcome: Ongoing, Progressing     Problem: Skin Injury Risk Increased  Goal: Skin Health and Integrity  Outcome: Ongoing, Progressing     Problem: Hemodynamic Instability (Hemodialysis)  Goal: Effective Tissue Perfusion  Outcome: Ongoing, Progressing     Problem: Infection (Hemodialysis)  Goal: Absence of Infection Signs and Symptoms  Outcome: Ongoing, Progressing     Problem: Fall Injury Risk  Goal: Absence of Fall and Fall-Related Injury  Outcome: Ongoing, Progressing

## 2023-01-22 NOTE — PROGRESS NOTES
[facility] - [facility] ([address] Medicine  Progress Note    Patient Name: Kristin Goodman  MRN: [id number]  Patient Class: IP- Inpatient   Admission Date: 1/9/2023  Length of Stay: 13 days  Attending Physician: Miguel Snider MD  Primary Care Provider: Lori Hernandez MD        Subjective:     Principal Problem:Bacteremia due to Enterococcus        HPI:  77-year-old  woman with HTN, hypothyroidism, HFpEF, and osteoarthritis and new acute renal failure due to new diagnosis of Microscopic Polyangiitis s/p renal biopsy and requiring hemodialysis is transferred from [facility] for concerns of new bacteremia.     She was hospitalized from 10/17/22-12/13/22 then transferred to [facility].  Microscopic polyangiitis with pulmonary and renal involvement had suffered respiratory failure with diffuse alveolar hemorrhage, gi bleeding, and renal replacement therapy. S/p Rheumatology consultation and pulse IV steroids + plasmapheresis with Transfusion medicine started on Rituximab and Cyclophosphamide.  Continues on Steroid taper for immunosuppression.     More recently earlier this week noted to have a urine culture grow ESBL E.coli Cystitis, started on meropenem, then she had blood cultures from 1/5 and 1/7 that have grown Enterococcus (amp sensitive) vancomycin started today, patient had sluggish HD catheter with dialysis.   Also ED report that patient may have had syncope during HD today.     She is transferred for continued infectious workup and management as well as removal of tunneled HD line for line holiday.       Overview/Hospital Course:  Seen by ID and nephrology. Plan for line holiday and IR consulted.      Interval History:  Patient does not have any new complaints today.  Discussed with ID, plan to interrogate TDC likely tomorrow.  NPO midnight except meds.    Review of Systems  Objective:     Vital Signs (Most Recent):  Temp: 96 °F (35.6 °C) (01/22/23 1201)  Pulse: 87  (01/22/23 1201)  Resp: 17 (01/22/23 1201)  BP: (!) 173/79 (01/22/23 1201)  SpO2: (!) 91 % (01/22/23 1201)   Vital Signs (24h Range):  Temp:  [96 °F (35.6 °C)-99 °F (37.2 °C)] 96 °F (35.6 °C)  Pulse:  [80-87] 87  Resp:  [15-18] 17  SpO2:  [91 %-95 %] 91 %  BP: (127-173)/(60-79) 173/79     Weight: 63.2 kg (139 lb 5.3 oz)  Body mass index is 26.33 kg/m².    Intake/Output Summary (Last 24 hours) at 1/22/2023 1223  Last data filed at 1/21/2023 1806  Gross per 24 hour   Intake 100 ml   Output --   Net 100 ml      Physical Exam  Vitals reviewed.   Constitutional:       General: She is not in acute distress.     Appearance: Normal appearance. She is not toxic-appearing.   HENT:      Head: Normocephalic and atraumatic.   Eyes:      General: No scleral icterus.  Cardiovascular:      Rate and Rhythm: Normal rate and regular rhythm.      Pulses: Normal pulses.      Heart sounds: No murmur heard.  Pulmonary:      Effort: Pulmonary effort is normal. No respiratory distress.      Breath sounds: No wheezing, rhonchi or rales.   Chest:      Comments: R TDC  Abdominal:      General: Bowel sounds are normal. There is no distension.      Tenderness: There is no abdominal tenderness. There is no guarding.   Musculoskeletal:      Right lower leg: No edema.      Left lower leg: No edema.   Skin:     General: Skin is warm.   Neurological:      General: No focal deficit present.      Mental Status: She is alert and oriented to person, place, and time. Mental status is at baseline.   Psychiatric:         Behavior: Behavior normal.         Thought Content: Thought content normal.           Assessment/Plan:      * Bacteremia due to Enterococcus  Positive blood cxs 1/5, 1/7, 1/9. Received 1 gm IV vancomycin at LTAC on 1/10.   HD tunelled cath removed 1/10.  Also has midline from LTAC - removed. Surface echo - no vegetation.  -infectious disease consultation appreciated  -last surveillance bcxs 1/11/23 NGTD  -continue Vanc  -CT abd/pelvis with  oral contrast per ID recs - negative  -Per ID - 2-weeks vanc from negative bcxs/line removal - end date 1/24    Acute deep vein thrombosis (DVT) of non-extremity vein - subclavian  Heparin drip started 1/19 pending procedures to re-establish HD access.  Ultrasound bilateral upper extremity t:    Pauci-immune vasculitis  Continue steroid tape    Urinary tract infection due to extended-spectrum beta lactamase (ESBL) producing Escherichia coli  Urine culture from 1/05 with ESBL E.coli   -Ertapenem renal dosing 500mg IV q24 ordered, completed 5 days (1/5-1/10). Missed dose on 1/9 due to transfer to hospital    Anemia due to chronic kidney disease  Has required transfusions during recent inpatient/LTAC stays   -was receiving EPO infusion at nephrology direction.   Hb 6.7 on 01/16.  Ordered a unit of blood.  Consent obtained.  Continue to monitor CBC.  Goal for transfusion hemoglobin less than 7.    Primary pauci-immune necrotizing and crescentic glomerulonephritis  Patient with acute kidney injury likely due to microscopic polyangiits with granulomatosis (MPA) WILFREDO is currently stable. Labs reviewed- Renal function/electrolytes with Estimated Creatinine Clearance: 7.3 mL/min (A) (based on SCr of 5.7 mg/dL (H)). according to latest data. Monitor urine output and serial BMP and adjust therapy as needed. Avoid nephrotoxins and renally dose meds for GFR listed above.  Had complex course with DAH, requiring pulse dose steroids, plasmapheresis and then rituximab and cyclophosphamide  -continue prednisone taper  -continue atovaquone for PJP ppx  -will need outpatient Rheum follow up    Gastric reflux  Continue BID PPI    Dysphagia  Continue renal diet   Re-consulting SLP    Acute renal failure on dialysis  -due to Microscopic Polyangiitis  -HD on hold due to line holiday. HDS, euvolemic, labs reviewed - no emergent indication for HD today  -bcxs ng x 48 hours, ID is ok with hd cath insertion.   - IR consulted. S/p permcath  placement  on 1/17/23   -Nephrology following.  Plans for hemodialysis delayed due to access issues.  -US of UE showed DVTs in R and L UE.   - CXR:The dialysis catheter remains in position  - IR following for possible replacement of TDC. Tentatively plan for tomorrow.  NPO midnight.    Microscopic polyangiitis  -continue steroid taper   -patient is on OI prophylaxis with atovaquone 1500mg po daily  -continued on Protonix 40mg po bid while on glucocorticoids.     Impaired mobility  OT/PT - plan for SNF    Chronic diastolic heart failure  Has preserved EF   -HD was primary volume maintenance   -continue Lasix 40mg po daily - consider higher or more frequent doses are required during her LIne holiday     Syncope  Report of possible syncope with HD,  Dizzy spells noted per LTAC notes - pt s/p MRI brain on 12/8 w/o acute findings   -neurology prior eval has recommended PT/OT for vestibular therapy  -Future outpatient cardiology visit for possible tilt-table eval.   -refer to neurology at discharge for BPPV follow up    Primary hypertension  Continue carvedilol  -home lisinopril is on hold due to her WILFREDO    Hypothyroid  Continue levothyroxine 25mcg       VTE Risk Mitigation (From admission, onward)         Ordered     heparin 25,000 units in dextrose 5% (100 units/ml) IV bolus from bag - ADDITIONAL PRN BOLUS - 60 units/kg  As needed (PRN)        Question:  Heparin Infusion Adjustment (DO NOT MODIFY ANSWER)  Answer:  \[email or web address]\Images\Pharmacy\HeparinInfusions\heparin LOW INTENSITY nomogram for [facility] [id number]    01/19/23 2201     heparin 25,000 units in dextrose 5% (100 units/ml) IV bolus from bag - ADDITIONAL PRN BOLUS - 30 units/kg  As needed (PRN)        Question:  Heparin Infusion Adjustment (DO NOT MODIFY ANSWER)  Answer:  \[email or web address]\[email or web address]\Images\Pharmacy\HeparinInfusions\heparin LOW INTENSITY nomogram for [facility] [id number]    01/19/23 2201     heparin 25,000 units in dextrose 5% 250 mL (100 units/mL) infusion  LOW INTENSITY nomogram - [facility]  Continuous        Question Answer Comment   Heparin Infusion Adjustment (DO NOT MODIFY ANSWER) [email or web address]\[email or web address]\Images\Pharmacy\HeparinInfusions\heparin LOW INTENSITY nomogram for [facility] [id number]    Begin at (in units/kg/hr) 12        01/19/23 2201     heparin (porcine) injection 1,000 Units  As needed (PRN)         01/17/23 1421     heparin (porcine) injection 1,000 Units  As needed (PRN)         01/09/23 2330     Place sequential compression device  Until discontinued         01/09/23 2330                Discharge Planning   ANTHONY: 1/24/2023     Code Status: Full Code   Is the patient medically ready for discharge?: No    Reason for patient still in hospital (select all that apply): Patient trending condition  Discharge Plan A: Skilled Nursing Facility   Discharge Delays: None known at this time              Miguel Snider MD  Department of Hospital Medicine   [facility] - Intensive Care ([place])

## 2023-01-22 NOTE — PROGRESS NOTES
Pharmacokinetic Assessment Follow Up: IV Vancomycin    Vancomycin serum concentration assessment(s):    The random level resulted in 19.4, was drawn correctly and can be used to guide therapy at this time. The measurement is within the desired definitive target range of 15 to 20 mcg/mL.    Vancomycin Regimen Plan:  -HD on hold due to line holiday. HDS, euvolemic, labs reviewed - no emergent indication for HD today  - CXR:The dialysis catheter remains in position  - IR following for possible replacement of cat. Plan not finalized yet     Vancomycin random level in AM-Labs on 1/23.    Drug levels (last 3 results):  Recent Labs   Lab Result Units 01/20/23  0413 01/21/23  0508 01/22/23  0330   Vancomycin, Random ug/mL 18.1 15.5 19.4       Pharmacy will continue to follow and monitor vancomycin.    Please contact pharmacy at extension [phone] for questions regarding this assessment.    Thank you for the consult,   Rad Correia       Patient brief summary:  Kristin Goodman is a 77 y.o. female initiated on antimicrobial therapy with IV Vancomycin for treatment of bacteremia    The patient's current regimen is pulse dosing.     Drug Allergies:   Review of patient's allergies indicates:   Allergen Reactions    Ampicillin     Peaches [peach (prunus persica)] Other (See Comments)     Pt unable to state type of reaction. Information obtained from daughter who states she was informed of allergy from patient.    Penicillins      Other reaction(s): Hives, anaphylaxis    Sulfa (sulfonamide antibiotics) Rash and Hives       Actual Body Weight:   63.2kg    Renal Function:   Estimated Creatinine Clearance: 7.3 mL/min (A) (based on SCr of 5.7 mg/dL (H)).,     Dialysis Method (if applicable):  intermittent HD    CBC (last 72 hours):  Recent Labs   Lab Result Units 01/20/23  0413 01/21/23  0508 01/22/23  0330   WBC K/uL 16.88* 15.98* 16.89*   Hemoglobin g/dL 9.2* 9.1* 9.3*   Hematocrit % 30.4* 30.7* 29.8*   Platelets K/uL 344 357 392   Gran %  % 62.1 64.6 65.2   Lymph % % 23.4 19.3 20.2   Mono % % 11.4 11.6 11.3   Eosinophil % % 0.7 0.8 0.1   Basophil % % 0.4 0.5 0.2   Differential Method  Automated Automated Automated       Metabolic Panel (last 72 hours):  Recent Labs   Lab Result Units 01/21/23  0509 01/22/23  0330   Sodium mmol/L 142 145   Potassium mmol/L 4.0 3.9   Chloride mmol/L 103 102   CO2 mmol/L 24 26   Glucose mg/dL 78 73   BUN mg/dL 73* 76*   Creatinine mg/dL 5.7* 5.7*   Albumin g/dL 2.4*  --    Magnesium mg/dL  --  1.9   Phosphorus mg/dL 4.5 4.2       Vancomycin Administrations:  vancomycin given in the last 96 hours                     vancomycin (VANCOCIN) 250 mg in dextrose 5 % 100 mL IVPB (mg) 250 mg New Bag 01/21/23 1427    vancomycin (VANCOCIN) 500 mg in dextrose 5 % in water (D5W) 5 % 100 mL IVPB (MB+) (mg) 500 mg New Bag 01/18/23 2105                    Microbiologic Results:  Microbiology Results (last 7 days)       Procedure Component Value Units Date/Time    Blood culture [[id number]] Collected: 01/11/23 0958    Order Status: Completed Specimen: Blood Updated: 01/16/23 1212     Blood Culture, Routine No growth after 5 days.    Narrative:      Collection has been rescheduled by [email or web address] at 01/11/2023 09:23 Reason:   Patient unavailable is a hard stick Suzy [id number]  Collection has been rescheduled by [email or web address] at 01/11/2023 09:23 Reason:   Patient unavailable is a hard stick Suzy [phone]    Blood culture [[id number]] Collected: 01/11/23 0958    Order Status: Completed Specimen: Blood Updated: 01/16/23 1212     Blood Culture, Routine No growth after 5 days.    Narrative:      Collection has been rescheduled by [email or web address] at 01/11/2023 09:23 Reason:   Patient unavailable is a hard stick Suzy [id number]  Collection has been rescheduled by [email or web address] at 01/11/2023 09:23 Reason:   Patient unavailable is a hard stick Suzy [phone]
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
## 2023-01-22 NOTE — PLAN OF CARE
Bedside swallow assessment completed. Pt safe to continue regular diet with thin liquids. ST to f/u to ensure tolerance. Kacie Spears CCC-SLP 1/22/2023 2:33 PM

## 2023-01-23 LAB
ANION GAP SERPL CALC-SCNC: 14 MMOL/L (ref 8–16)
APTT BLDCRRT: 46.3 SEC (ref 21–32)
BASOPHILS # BLD AUTO: 0.06 K/UL (ref 0–0.2)
BASOPHILS NFR BLD: 0.4 % (ref 0–1.9)
BUN SERPL-MCNC: 79 MG/DL (ref 8–23)
CALCIUM SERPL-MCNC: 9.1 MG/DL (ref 8.7–10.5)
CHLORIDE SERPL-SCNC: 101 MMOL/L (ref 95–110)
CO2 SERPL-SCNC: 30 MMOL/L (ref 23–29)
CREAT SERPL-MCNC: 5.8 MG/DL (ref 0.5–1.4)
DIFFERENTIAL METHOD: ABNORMAL
EOSINOPHIL # BLD AUTO: 0.1 K/UL (ref 0–0.5)
EOSINOPHIL NFR BLD: 0.5 % (ref 0–8)
ERYTHROCYTE [DISTWIDTH] IN BLOOD BY AUTOMATED COUNT: 16.1 % (ref 11.5–14.5)
EST. GFR  (NO RACE VARIABLE): 7.1 ML/MIN/1.73 M^2
GLUCOSE SERPL-MCNC: 78 MG/DL (ref 70–110)
HCT VFR BLD AUTO: 26.9 % (ref 37–48.5)
HGB BLD-MCNC: 8.2 G/DL (ref 12–16)
IMM GRANULOCYTES # BLD AUTO: 0.43 K/UL (ref 0–0.04)
IMM GRANULOCYTES NFR BLD AUTO: 3.1 % (ref 0–0.5)
LYMPHOCYTES # BLD AUTO: 2.7 K/UL (ref 1–4.8)
LYMPHOCYTES NFR BLD: 19.8 % (ref 18–48)
MAGNESIUM SERPL-MCNC: 2 MG/DL (ref 1.6–2.6)
MCH RBC QN AUTO: 27.6 PG (ref 27–31)
MCHC RBC AUTO-ENTMCNC: 30.5 G/DL (ref 32–36)
MCV RBC AUTO: 91 FL (ref 82–98)
MONOCYTES # BLD AUTO: 1.6 K/UL (ref 0.3–1)
MONOCYTES NFR BLD: 11.4 % (ref 4–15)
NEUTROPHILS # BLD AUTO: 8.9 K/UL (ref 1.8–7.7)
NEUTROPHILS NFR BLD: 64.8 % (ref 38–73)
NRBC BLD-RTO: 0 /100 WBC
PHOSPHATE SERPL-MCNC: 4.5 MG/DL (ref 2.7–4.5)
PLATELET # BLD AUTO: 384 K/UL (ref 150–450)
PMV BLD AUTO: 11.1 FL (ref 9.2–12.9)
POTASSIUM SERPL-SCNC: 3.9 MMOL/L (ref 3.5–5.1)
RBC # BLD AUTO: 2.97 M/UL (ref 4–5.4)
SODIUM SERPL-SCNC: 145 MMOL/L (ref 136–145)
VANCOMYCIN SERPL-MCNC: 15.7 UG/ML
WBC # BLD AUTO: 13.8 K/UL (ref 3.9–12.7)

## 2023-01-23 PROCEDURE — 85730 THROMBOPLASTIN TIME PARTIAL: CPT | Mod: 91 | Performed by: PHYSICIAN ASSISTANT

## 2023-01-23 PROCEDURE — 25000003 PHARM REV CODE 250: Performed by: STUDENT IN AN ORGANIZED HEALTH CARE EDUCATION/TRAINING PROGRAM

## 2023-01-23 PROCEDURE — 97116 GAIT TRAINING THERAPY: CPT

## 2023-01-23 PROCEDURE — 99232 PR SUBSEQUENT HOSPITAL CARE,LEVL II: ICD-10-PCS | Mod: ,,, | Performed by: HOSPITALIST

## 2023-01-23 PROCEDURE — 63600175 PHARM REV CODE 636 W HCPCS: Performed by: INTERNAL MEDICINE

## 2023-01-23 PROCEDURE — 99900035 HC TECH TIME PER 15 MIN (STAT)

## 2023-01-23 PROCEDURE — 63600175 PHARM REV CODE 636 W HCPCS: Performed by: RADIOLOGY

## 2023-01-23 PROCEDURE — 80048 BASIC METABOLIC PNL TOTAL CA: CPT | Performed by: STUDENT IN AN ORGANIZED HEALTH CARE EDUCATION/TRAINING PROGRAM

## 2023-01-23 PROCEDURE — 97535 SELF CARE MNGMENT TRAINING: CPT

## 2023-01-23 PROCEDURE — 80202 ASSAY OF VANCOMYCIN: CPT | Performed by: STUDENT IN AN ORGANIZED HEALTH CARE EDUCATION/TRAINING PROGRAM

## 2023-01-23 PROCEDURE — 63600175 PHARM REV CODE 636 W HCPCS: Performed by: STUDENT IN AN ORGANIZED HEALTH CARE EDUCATION/TRAINING PROGRAM

## 2023-01-23 PROCEDURE — 63600175 PHARM REV CODE 636 W HCPCS: Performed by: HOSPITALIST

## 2023-01-23 PROCEDURE — 99232 SBSQ HOSP IP/OBS MODERATE 35: CPT | Mod: ,,, | Performed by: HOSPITALIST

## 2023-01-23 PROCEDURE — 36415 COLL VENOUS BLD VENIPUNCTURE: CPT | Performed by: PHYSICIAN ASSISTANT

## 2023-01-23 PROCEDURE — 83735 ASSAY OF MAGNESIUM: CPT | Performed by: STUDENT IN AN ORGANIZED HEALTH CARE EDUCATION/TRAINING PROGRAM

## 2023-01-23 PROCEDURE — 85025 COMPLETE CBC W/AUTO DIFF WBC: CPT | Performed by: INTERNAL MEDICINE

## 2023-01-23 PROCEDURE — 25000003 PHARM REV CODE 250: Performed by: HOSPITALIST

## 2023-01-23 PROCEDURE — 12000002 HC ACUTE/MED SURGE SEMI-PRIVATE ROOM

## 2023-01-23 PROCEDURE — 99233 SBSQ HOSP IP/OBS HIGH 50: CPT | Mod: ,,, | Performed by: STUDENT IN AN ORGANIZED HEALTH CARE EDUCATION/TRAINING PROGRAM

## 2023-01-23 PROCEDURE — 25500020 PHARM REV CODE 255: Performed by: STUDENT IN AN ORGANIZED HEALTH CARE EDUCATION/TRAINING PROGRAM

## 2023-01-23 PROCEDURE — 85730 THROMBOPLASTIN TIME PARTIAL: CPT | Performed by: STUDENT IN AN ORGANIZED HEALTH CARE EDUCATION/TRAINING PROGRAM

## 2023-01-23 PROCEDURE — 84100 ASSAY OF PHOSPHORUS: CPT | Performed by: STUDENT IN AN ORGANIZED HEALTH CARE EDUCATION/TRAINING PROGRAM

## 2023-01-23 PROCEDURE — 97530 THERAPEUTIC ACTIVITIES: CPT

## 2023-01-23 PROCEDURE — 99233 PR SUBSEQUENT HOSPITAL CARE,LEVL III: ICD-10-PCS | Mod: ,,, | Performed by: STUDENT IN AN ORGANIZED HEALTH CARE EDUCATION/TRAINING PROGRAM

## 2023-01-23 PROCEDURE — 25000003 PHARM REV CODE 250: Performed by: RADIOLOGY

## 2023-01-23 PROCEDURE — 94761 N-INVAS EAR/PLS OXIMETRY MLT: CPT

## 2023-01-23 RX ORDER — HEPARIN SODIUM 1000 [USP'U]/ML
1000 INJECTION, SOLUTION INTRAVENOUS; SUBCUTANEOUS
Status: ACTIVE | OUTPATIENT
Start: 2023-01-23 | End: 2023-01-24

## 2023-01-23 RX ORDER — MIDAZOLAM HYDROCHLORIDE 1 MG/ML
INJECTION INTRAMUSCULAR; INTRAVENOUS
Status: COMPLETED | OUTPATIENT
Start: 2023-01-23 | End: 2023-01-23

## 2023-01-23 RX ORDER — FENTANYL CITRATE 50 UG/ML
INJECTION, SOLUTION INTRAMUSCULAR; INTRAVENOUS
Status: COMPLETED | OUTPATIENT
Start: 2023-01-23 | End: 2023-01-23

## 2023-01-23 RX ORDER — SODIUM CHLORIDE 9 MG/ML
INJECTION, SOLUTION INTRAVENOUS ONCE
Status: DISCONTINUED | OUTPATIENT
Start: 2023-01-23 | End: 2023-01-25

## 2023-01-23 RX ADMIN — MIDAZOLAM HYDROCHLORIDE 0.5 MG: 1 INJECTION, SOLUTION INTRAMUSCULAR; INTRAVENOUS at 03:01

## 2023-01-23 RX ADMIN — SODIUM BICARBONATE 650 MG: 650 TABLET ORAL at 09:01

## 2023-01-23 RX ADMIN — PANTOPRAZOLE SODIUM 40 MG: 40 TABLET, DELAYED RELEASE ORAL at 06:01

## 2023-01-23 RX ADMIN — VANCOMYCIN HYDROCHLORIDE 250 MG: 500 INJECTION, POWDER, LYOPHILIZED, FOR SOLUTION INTRAVENOUS at 11:01

## 2023-01-23 RX ADMIN — LEVOTHYROXINE SODIUM 25 MCG: 25 TABLET ORAL at 05:01

## 2023-01-23 RX ADMIN — MAGNESIUM OXIDE TAB 400 MG (241.3 MG ELEMENTAL MG) 400 MG: 400 (241.3 MG) TAB at 09:01

## 2023-01-23 RX ADMIN — HEPARIN SODIUM 1600 UNITS: 1000 INJECTION, SOLUTION INTRAVENOUS; SUBCUTANEOUS at 03:01

## 2023-01-23 RX ADMIN — IOHEXOL 30 ML: 300 INJECTION, SOLUTION INTRAVENOUS at 03:01

## 2023-01-23 RX ADMIN — HEPARIN SODIUM 11 UNITS/KG/HR: 10000 INJECTION, SOLUTION INTRAVENOUS at 11:01

## 2023-01-23 RX ADMIN — MIDAZOLAM HYDROCHLORIDE 1 MG: 1 INJECTION, SOLUTION INTRAMUSCULAR; INTRAVENOUS at 03:01

## 2023-01-23 RX ADMIN — PANTOPRAZOLE SODIUM 40 MG: 40 TABLET, DELAYED RELEASE ORAL at 04:01

## 2023-01-23 RX ADMIN — DEXTROSE MONOHYDRATE 1 G: 5 INJECTION INTRAVENOUS at 03:01

## 2023-01-23 RX ADMIN — FLUTICASONE PROPIONATE 50 MCG: 50 SPRAY, METERED NASAL at 09:01

## 2023-01-23 RX ADMIN — WHITE PETROLATUM: 1.75 OINTMENT TOPICAL at 09:01

## 2023-01-23 RX ADMIN — FUROSEMIDE 40 MG: 40 TABLET ORAL at 09:01

## 2023-01-23 RX ADMIN — PREDNISONE 5 MG: 5 TABLET ORAL at 09:01

## 2023-01-23 RX ADMIN — FENTANYL CITRATE 50 MCG: 50 INJECTION, SOLUTION INTRAMUSCULAR; INTRAVENOUS at 03:01

## 2023-01-23 RX ADMIN — CLONIDINE HYDROCHLORIDE 0.1 MG: 0.1 TABLET ORAL at 12:01

## 2023-01-23 RX ADMIN — FENTANYL CITRATE 25 MCG: 50 INJECTION, SOLUTION INTRAMUSCULAR; INTRAVENOUS at 03:01

## 2023-01-23 RX ADMIN — Medication 1 TABLET: at 09:01

## 2023-01-23 RX ADMIN — LIDOCAINE 1 PATCH: 50 PATCH CUTANEOUS at 05:01

## 2023-01-23 RX ADMIN — QUETIAPINE FUMARATE 12.5 MG: 25 TABLET ORAL at 08:01

## 2023-01-23 NOTE — ASSESSMENT & PLAN NOTE
-due to Microscopic Polyangiitis  -HD on hold due to line holiday. HDS, euvolemic, labs reviewed - no emergent indication for HD today  -bcxs ng x 48 hours, ID is ok with hd cath insertion.   - IR consulted. S/p permcath placement  on 1/17/23   - Nephrology following.  Plans for hemodialysis delayed due to access issues.  - US of UE showed DVTs in R and L UE.   - CXR:The dialysis catheter remains in position  -plan for TDC replacement on 01/23 by IR.

## 2023-01-23 NOTE — SUBJECTIVE & OBJECTIVE
Interval History:  Patient does not have any new complaints.  Plan for TDC placement with IR today.  Resume diet postprocedure as appropriate.  Nephrology following.  Review of Systems  Objective:     Vital Signs (Most Recent):  Temp: 99.3 °F (37.4 °C) (01/23/23 1143)  Pulse: 72 (01/23/23 1143)  Resp: 18 (01/23/23 1143)  BP: (!) 154/81 (01/23/23 1143)  SpO2: (!) 94 % (01/23/23 1143)   Vital Signs (24h Range):  Temp:  [97.8 °F (36.6 °C)-99.3 °F (37.4 °C)] 99.3 °F (37.4 °C)  Pulse:  [69-82] 72  Resp:  [18-20] 18  SpO2:  [94 %-96 %] 94 %  BP: (135-192)/(63-87) 154/81     Weight: 61.6 kg (135 lb 12.9 oz)  Body mass index is 25.66 kg/m².    Intake/Output Summary (Last 24 hours) at 1/23/2023 1411  Last data filed at 1/23/2023 0000  Gross per 24 hour   Intake 100 ml   Output --   Net 100 ml      Physical Exam  Vitals reviewed.   Constitutional:       General: She is not in acute distress.     Appearance: Normal appearance. She is not toxic-appearing.   HENT:      Head: Normocephalic and atraumatic.   Eyes:      General: No scleral icterus.  Cardiovascular:      Rate and Rhythm: Normal rate and regular rhythm.      Pulses: Normal pulses.      Heart sounds: No murmur heard.  Pulmonary:      Effort: Pulmonary effort is normal. No respiratory distress.      Breath sounds: No wheezing, rhonchi or rales.   Chest:      Comments: R TDC  Abdominal:      General: Bowel sounds are normal. There is no distension.      Tenderness: There is no abdominal tenderness. There is no guarding.   Musculoskeletal:      Right lower leg: No edema.      Left lower leg: No edema.   Skin:     General: Skin is warm.   Neurological:      General: No focal deficit present.      Mental Status: She is alert and oriented to person, place, and time. Mental status is at baseline.   Psychiatric:         Behavior: Behavior normal.         Thought Content: Thought content normal.

## 2023-01-23 NOTE — PROGRESS NOTES
Pharmacokinetic Assessment Follow Up: IV Vancomycin    Vancomycin serum concentration assessment(s):    Vancomycin random=15.5 mcg/mL, drawn appropriately     Vancomycin Regimen Plan:    No HD scheduled so far for today. According to trend, appears patient is still clearing vanc so will give vanc 250mg IV x1 today so she doesn't fall <15 mcg/mL.  Vanc random in the AM, will f/u on HD plans    Drug levels (last 3 results):  Recent Labs   Lab Result Units 01/21/23  0508 01/22/23  0330 01/23/23  0630   Vancomycin, Random ug/mL 15.5 19.4 15.7         Pharmacy will continue to follow and monitor vancomycin.    Thank you for the consult,   [place], PharmD  Ext [phone]     Patient brief summary:  Kristin Goodman is a 77 y.o. female initiated on antimicrobial therapy with IV Vancomycin for treatment of bacteremia    The patient's current regimen is pulse dosing    Actual Body Weight:   63kg    Renal Function:   Estimated Creatinine Clearance: 7.1 mL/min (A) (based on SCr of 5.8 mg/dL (H)).,     Dialysis Method (if applicable):  intermittent HD    CBC (last 72 hours):  Recent Labs   Lab Result Units 01/21/23  0508 01/22/23  0330   WBC K/uL 15.98* 16.89*   Hemoglobin g/dL 9.1* 9.3*   Hematocrit % 30.7* 29.8*   Platelets K/uL 357 392   Gran % % 64.6 65.2   Lymph % % 19.3 20.2   Mono % % 11.6 11.3   Eosinophil % % 0.8 0.1   Basophil % % 0.5 0.2   Differential Method  Automated Automated         Metabolic Panel (last 72 hours):  Recent Labs   Lab Result Units 01/21/23  0509 01/22/23  0330 01/23/23  0630   Sodium mmol/L 142 145 145   Potassium mmol/L 4.0 3.9 3.9   Chloride mmol/L 103 102 101   CO2 mmol/L 24 26 30*   Glucose mg/dL 78 73 78   BUN mg/dL 73* 76* 79*   Creatinine mg/dL 5.7* 5.7* 5.8*   Albumin g/dL 2.4*  --   --    Magnesium mg/dL  --  1.9 2.0   Phosphorus mg/dL 4.5 4.2 4.5         Vancomycin Administrations:  vancomycin given in the last 96 hours                     vancomycin (VANCOCIN) 500 mg in dextrose 5 % in  water (D5W) 5 % 100 mL IVPB (MB+) (mg) 500 mg New Bag 01/18/23 2105                    Microbiologic Results:  Microbiology Results (last 7 days)       Procedure Component Value Units Date/Time    Blood culture [[id number]] Collected: 01/11/23 0958    Order Status: Completed Specimen: Blood Updated: 01/16/23 1212     Blood Culture, Routine No growth after 5 days.    Narrative:      Collection has been rescheduled by [email or web address] at 01/11/2023 09:23 Reason:   Patient unavailable is a hard stick Suzy [id number]  Collection has been rescheduled by [email or web address] at 01/11/2023 09:23 Reason:   Patient unavailable is a hard stick Suzy [id number]    Blood culture [[id number]] Collected: 01/11/23 0958    Order Status: Completed Specimen: Blood Updated: 01/16/23 1212     Blood Culture, Routine No growth after 5 days.    Narrative:      Collection has been rescheduled by [email or web address] at 01/11/2023 09:23 Reason:   Patient unavailable is a hard stick Suzy [id number]  Collection has been rescheduled by [email or web address] at 01/11/2023 09:23 Reason:   Patient unavailable is a hard stick Suzy [id number]

## 2023-01-23 NOTE — PT/OT/SLP DISCHARGE
Occupational Therapy Discharge Summary    Kristin Goodman  MRN: [id number]   Principal Problem: Bacteremia due to Enterococcus      Patient Discharged from acute Occupational Therapy on 1/23.    Assessment:     Kristin Goodman is a 77 y.o. female with a medical diagnosis of Bacteremia due to Enterococcus. Pt demonstrated significant functional improvements in functional ADLs (toileting, grooming, and dressing) requiring CGA for stability. Pt ambulated home distances with CGA using RW.   Patient appropriate for care in another setting.    Objective:     GOALS:   Multidisciplinary Problems       Occupational Therapy Goals          Problem: Occupational Therapy    Goal Priority Disciplines Outcome Interventions   Occupational Therapy Goal     OT, PT/OT Adequate for Care Transition    Description: Goals to be met by: 1/17/2023 (1 week)  Goals remain appropriate  and extended to 01 31--23    Patient will increase functional independence with ADLs by performing:    UE Dressing with [place].  LE Dressing with [place].  Grooming while standing at sink with [place].  Toileting from toilet with [place] for hygiene and clothing management.   Step transfer with [place]  Toilet transfer to toilet with [place].                         Reasons for Discontinuation of Therapy Services  Transfer to alternate level of care. and Satisfactory goal achievement.      Plan:     Patient Discharged to: Home with Home Health Service    1/23/2023

## 2023-01-23 NOTE — SUBJECTIVE & OBJECTIVE
Interval History: Patient seen and examined this AM. NPO since midnight for replacement of tunneled HD catheter today with tentative plans for HD thereafter. Afebrile overnight with pulse ranging from 80-70s bpm. Systolic blood pressures ranging from 190-150s mmHg. She is saturating +94% on room air with no documented urine output. Leukocytosis improved today 13.8K from 16.9K. Renal function largely stable.     Review of patient's allergies indicates:   Allergen Reactions    Ampicillin     Peaches [peach (prunus persica)] Other (See Comments)     Pt unable to state type of reaction. Information obtained from daughter who states she was informed of allergy from patient.    Penicillins      Other reaction(s): Hives, anaphylaxis    Sulfa (sulfonamide antibiotics) Rash and Hives     Current Facility-Administered Medications   Medication Frequency    0.9%  NaCl infusion (for blood administration) Q24H PRN    0.9%  NaCl infusion Once    acetaminophen tablet 650 mg Q4H PRN    albuterol-ipratropium 2.5 mg-0.5 mg/3 mL nebulizer solution 3 mL Q4H PRN    aluminum-magnesium hydroxide 200-200 mg/5 mL suspension 30 mL QID PRN    atovaquone 750 mg/5 mL oral liquid 1,500 mg Daily    B-complex with vitamin C tablet 1 tablet Daily    bisacodyL suppository 10 mg Daily PRN    cloNIDine tablet 0.1 mg Q8H PRN    epoetin hira injection 4,000 Units Every Tues, Thurs, Sat    fluticasone propionate 50 mcg/actuation nasal spray 50 mcg BID    furosemide tablet 40 mg Daily    heparin (porcine) injection 1,000 Units PRN    heparin 25,000 units in dextrose 5% (100 units/ml) IV bolus from bag - ADDITIONAL PRN BOLUS - 30 units/kg PRN    heparin 25,000 units in dextrose 5% (100 units/ml) IV bolus from bag - ADDITIONAL PRN BOLUS - 60 units/kg PRN    heparin 25,000 units in dextrose 5% 250 mL (100 units/mL) infusion LOW INTENSITY nomogram - [facility] Continuous    levothyroxine tablet 25 mcg Before breakfast    LIDOcaine 5 % patch 1 patch Q24H    magnesium  oxide tablet 400 mg Daily    meclizine tablet 25 mg TID PRN    melatonin tablet 6 mg Nightly PRN    methocarbamoL tablet 500 mg TID PRN    naloxone 0.4 mg/mL injection 0.02 mg PRN    ondansetron injection 4 mg Q8H PRN    pantoprazole EC tablet 40 mg BID AC    predniSONE tablet 5 mg Daily    prochlorperazine injection Soln 5 mg Q6H PRN    quetiapine split tablet 12.5 mg QHS    senna-docusate 8.6-50 mg per tablet 1 tablet BID PRN    sevelamer carbonate tablet 800 mg TID WM    simethicone chewable tablet 80 mg QID PRN    sodium bicarbonate tablet 650 mg TID    sodium chloride 0.9% bolus 250 mL 250 mL PRN    sodium chloride 0.9% flush 10 mL PRN    sodium chloride 0.9% flush 10 mL Q6H PRN    sucralfate tablet 1 g TID PRN    tiZANidine tablet 4 mg BID PRN    traMADoL tablet 50 mg Q8H PRN    vancomycin (VANCOCIN) 250 mg in dextrose 5 % 100 mL IVPB Once    vancomycin - pharmacy to dose pharmacy to manage frequency    white petrolatum 41 % ointment Daily     Facility-Administered Medications Ordered in Other Encounters   Medication Frequency    celecoxib capsule 400 mg Once    fentaNYL 50 mcg/mL injection  mcg PRN    LIDOcaine (PF) 10 mg/ml (1%) injection 10 mg Once PRN    LIDOcaine (PF) 10 mg/ml (1%) injection 10 mg Once    midazolam (VERSED) 1 mg/mL injection 0.5-4 mg PRN    ropivacaine 0.2% [facility] PainPRO Pump infusion 500 ML Continuous       Objective:     Vital Signs (Most Recent):  Temp: 98 °F (36.7 °C) (01/23/23 0452)  Pulse: 75 (01/23/23 0452)  Resp: 18 (01/23/23 0452)  BP: (!) 180/79 (01/23/23 0452)  SpO2: 96 % (01/23/23 0452)   Vital Signs (24h Range):  Temp:  [96 °F (35.6 °C)-98.5 °F (36.9 °C)] 98 °F (36.7 °C)  Pulse:  [74-87] 75  Resp:  [17-20] 18  SpO2:  [91 %-96 %] 96 %  BP: (141-192)/(67-87) 180/79     Weight: 61.6 kg (135 lb 12.9 oz) (01/23/23 0218)  Body mass index is 25.66 kg/m².  Body surface area is 1.63 meters squared.    I/O last 3 completed shifts:  In: 100 [P.O.:100]  Out: -     Physical  Exam  Vitals and nursing note reviewed.   Constitutional:       General: She is awake. She is not in acute distress.     Appearance: Normal appearance. She is overweight. She is not ill-appearing or diaphoretic.   HENT:      Head: Normocephalic and atraumatic.      Right Ear: External ear normal.      Left Ear: External ear normal.      Nose: Nose normal.      Mouth/Throat:      Mouth: Mucous membranes are moist.      Pharynx: Oropharynx is clear. No oropharyngeal exudate or posterior oropharyngeal erythema.   Eyes:      General: No scleral icterus.        Right eye: No discharge.         Left eye: No discharge.      Extraocular Movements: Extraocular movements intact.      Conjunctiva/sclera: Conjunctivae normal.   Cardiovascular:      Rate and Rhythm: Normal rate.      Heart sounds: Murmur heard.   Systolic murmur is present with a grade of 1/6.     No friction rub. No gallop.      Comments: Bilateral pitting lower extremity edema which extends proximally.  Pulmonary:      Effort: Pulmonary effort is normal. No respiratory distress.      Breath sounds: No wheezing, rhonchi or rales.   Chest:      Comments: Tunneled HD catheter to right chest wall. Tenderness noted.  Abdominal:      General: Bowel sounds are normal. There is no distension.      Palpations: Abdomen is soft.      Tenderness: There is no abdominal tenderness.   Genitourinary:     Comments: Purewick in place.  Musculoskeletal:      Cervical back: Neck supple.      Right lower le+ Pitting Edema present.      Left lower le+ Pitting Edema present.   Skin:     General: Skin is warm and dry.      Capillary Refill: Capillary refill takes less than 2 seconds.      Coloration: Skin is not jaundiced.      Findings: No erythema.   Neurological:      General: No focal deficit present.      Mental Status: She is alert. Mental status is at baseline.      Cranial Nerves: No cranial nerve deficit.   Psychiatric:         Mood and Affect: Mood normal.          Behavior: Behavior normal. Behavior is cooperative.       Significant Labs:  BMP:   Recent Labs   Lab 01/23/23  0630   GLU 78      K 3.9      CO2 30*   BUN 79*   CREATININE 5.8*   CALCIUM 9.1   MG 2.0       CBC:   Recent Labs   Lab 01/23/23  0630   WBC 13.80*   RBC 2.97*   HGB 8.2*   HCT 26.9*      MCV 91   MCH 27.6   MCHC 30.5*       CMP:   Recent Labs   Lab 01/21/23  0509 01/22/23  0330 01/23/23  0630   GLU 78   < > 78   CALCIUM 8.9   < > 9.1   ALBUMIN 2.4*  --   --       < > 145   K 4.0   < > 3.9   CO2 24   < > 30*      < > 101   BUN 73*   < > 79*   CREATININE 5.7*   < > 5.8*    < > = values in this interval not displayed.       Coagulation:   Recent Labs   Lab 01/19/23  2246 01/20/23  0413 01/23/23  0630   INR 1.1  --   --    APTT 33.7*   < > 46.3*    < > = values in this interval not displayed.       LFTs:   Recent Labs   Lab 01/21/23  0509   ALBUMIN 2.4*       Microbiology Results (last 7 days)       Procedure Component Value Units Date/Time    Blood culture [[id number]] Collected: 01/11/23 0958    Order Status: Completed Specimen: Blood Updated: 01/16/23 1212     Blood Culture, Routine No growth after 5 days.    Narrative:      Collection has been rescheduled by [email or web address] at 01/11/2023 09:23 Reason:   Patient unavailable is a hard stick Suzy [id number]  Collection has been rescheduled by [email or web address] at 01/11/2023 09:23 Reason:   Patient unavailable is a hard stick Szuy [id number]    Blood culture [[id number]] Collected: 01/11/23 0958    Order Status: Completed Specimen: Blood Updated: 01/16/23 1212     Blood Culture, Routine No growth after 5 days.    Narrative:      Collection has been rescheduled by [email or web address] at 01/11/2023 09:23 Reason:   Patient unavailable is a hard stick Suzy [id number]  Collection has been rescheduled by [email or web address] at 01/11/2023 09:23 Reason:   Patient unavailable is a hard stick Suzy [id number]          Specimen (24h ago, onward)      None          Significant Imaging:  I have reviewed all  imagining in the last 24 hours.

## 2023-01-23 NOTE — PLAN OF CARE
Problem: Adult Inpatient Plan of Care  Goal: Plan of Care Review  Outcome: Ongoing, Progressing     Problem: Infection  Goal: Absence of Infection Signs and Symptoms  Outcome: Ongoing, Progressing  Intervention: Prevent or Manage Infection  Flowsheets (Taken 1/22/2023 1026)  Infection Management: aseptic technique maintained     Problem: Fluid and Electrolyte Imbalance (Acute Kidney Injury/Impairment)  Goal: Fluid and Electrolyte Balance  Outcome: Ongoing, Progressing  Intervention: Monitor and Manage Fluid and Electrolyte Balance  Flowsheets (Taken 1/22/2023 1026)  Fluid/Electrolyte Management: (labs monitored) other (see comments)     Problem: Skin Injury Risk Increased  Goal: Skin Health and Integrity  Outcome: Ongoing, Progressing  Intervention: Optimize Skin Protection  Flowsheets (Taken 1/22/2023 1026)  Pressure Reduction Techniques: frequent weight shift encouraged     Problem: Fall Injury Risk  Goal: Absence of Fall and Fall-Related Injury  Outcome: Ongoing, Progressing  Intervention: Identify and Manage Contributors  Flowsheets (Taken 1/22/2023 1026)  Self-Care Promotion:   independence encouraged   BADL personal objects within reach   BADL personal routines maintained   safe use of adaptive equipment encouraged

## 2023-01-23 NOTE — PLAN OF CARE
Hd tunneled cath placement complete. Pt tolerated well. VSS. No signs or symptoms of distress noted. RIJ site dressed w central line dressing and remains clean/dry/in tact. 1.6mL hep to each cath. Pt will be transferred to ROCU bed and report to be given at bedside.

## 2023-01-23 NOTE — PROGRESS NOTES
[facility] ([place] [address])  Nephrology  Progress Note    Patient Name: Kristin Goodman  MRN: [id number]  Admission Date: 1/9/2023  Hospital Length of Stay: 14 days  Attending Provider: Miguel Snider MD   Primary Care Physician: Lori Hernandez MD  Principal Problem:Bacteremia due to Enterococcus    Subjective:     HPI: Ms Goodman is a 77-year-old woman with hypertension, hypothyroidism, HFpEF (most recent TTE with EF 65% with G1DD), pulmonary hypertension, current ESBL E. coli UTI, osteoarthritis, chronic back pain, DONAVAN and microscopic polyangiitis complicated base on biopsy from 10/26 by renal failure, GIB and respiratory failure with history of diffuse alveolar hemorrhage s/p IV Solumedrol, PLEX x5 sessions, Rituximab x4 doses and cyclophosphamide however now  just on steroid taper (cyclophosphamide appears to be hold secondary to anemia) now iHD dependent twice weekly via tunneled HD catheter for metabolic clearance (follows with Dr. Mojica with [facility]) who presented from [facility] for TDC removal in the setting of positive blood cultures growing Enterococcus faecalis and Bacillus species from cultures obtained on 1/5 & 1/7. In addition patient with reported syncopal event and sluggish flows on HD on 1/9 (incomplete session, daughter attributes to iron infusion) with last full session of iHD being on 1/6. She reports still making good urine without any urinary complaints today. Nephrology has been consulted for inpatient HD needs.      Interval History: Patient seen and examined this AM. NPO since midnight for replacement of tunneled HD catheter today with tentative plans for HD thereafter. Afebrile overnight with pulse ranging from 80-70s bpm. Systolic blood pressures ranging from 190-150s mmHg. She is saturating +94% on room air with no documented urine output. Leukocytosis improved today 13.8K from 16.9K. Renal function largely stable.     Review of patient's allergies  indicates:   Allergen Reactions    Ampicillin     Peaches [peach (prunus persica)] Other (See Comments)     Pt unable to state type of reaction. Information obtained from daughter who states she was informed of allergy from patient.    Penicillins      Other reaction(s): Hives, anaphylaxis    Sulfa (sulfonamide antibiotics) Rash and Hives     Current Facility-Administered Medications   Medication Frequency    0.9%  NaCl infusion (for blood administration) Q24H PRN    0.9%  NaCl infusion Once    acetaminophen tablet 650 mg Q4H PRN    albuterol-ipratropium 2.5 mg-0.5 mg/3 mL nebulizer solution 3 mL Q4H PRN    aluminum-magnesium hydroxide 200-200 mg/5 mL suspension 30 mL QID PRN    atovaquone 750 mg/5 mL oral liquid 1,500 mg Daily    B-complex with vitamin C tablet 1 tablet Daily    bisacodyL suppository 10 mg Daily PRN    cloNIDine tablet 0.1 mg Q8H PRN    epoetin hira injection 4,000 Units Every Tues, Thurs, Sat    fluticasone propionate 50 mcg/actuation nasal spray 50 mcg BID    furosemide tablet 40 mg Daily    heparin (porcine) injection 1,000 Units PRN    heparin 25,000 units in dextrose 5% (100 units/ml) IV bolus from bag - ADDITIONAL PRN BOLUS - 30 units/kg PRN    heparin 25,000 units in dextrose 5% (100 units/ml) IV bolus from bag - ADDITIONAL PRN BOLUS - 60 units/kg PRN    heparin 25,000 units in dextrose 5% 250 mL (100 units/mL) infusion LOW INTENSITY nomogram - [facility] Continuous    levothyroxine tablet 25 mcg Before breakfast    LIDOcaine 5 % patch 1 patch Q24H    magnesium oxide tablet 400 mg Daily    meclizine tablet 25 mg TID PRN    melatonin tablet 6 mg Nightly PRN    methocarbamoL tablet 500 mg TID PRN    naloxone 0.4 mg/mL injection 0.02 mg PRN    ondansetron injection 4 mg Q8H PRN    pantoprazole EC tablet 40 mg BID AC    predniSONE tablet 5 mg Daily    prochlorperazine injection Soln 5 mg Q6H PRN    quetiapine split tablet 12.5 mg QHS    senna-docusate 8.6-50 mg per  tablet 1 tablet BID PRN    sevelamer carbonate tablet 800 mg TID WM    simethicone chewable tablet 80 mg QID PRN    sodium bicarbonate tablet 650 mg TID    sodium chloride 0.9% bolus 250 mL 250 mL PRN    sodium chloride 0.9% flush 10 mL PRN    sodium chloride 0.9% flush 10 mL Q6H PRN    sucralfate tablet 1 g TID PRN    tiZANidine tablet 4 mg BID PRN    traMADoL tablet 50 mg Q8H PRN    vancomycin (VANCOCIN) 250 mg in dextrose 5 % 100 mL IVPB Once    vancomycin - pharmacy to dose pharmacy to manage frequency    white petrolatum 41 % ointment Daily     Facility-Administered Medications Ordered in Other Encounters   Medication Frequency    celecoxib capsule 400 mg Once    fentaNYL 50 mcg/mL injection  mcg PRN    LIDOcaine (PF) 10 mg/ml (1%) injection 10 mg Once PRN    LIDOcaine (PF) 10 mg/ml (1%) injection 10 mg Once    midazolam (VERSED) 1 mg/mL injection 0.5-4 mg PRN    ropivacaine 0.2% [facility] PainPRO Pump infusion 500 ML Continuous       Objective:     Vital Signs (Most Recent):  Temp: 98 °F (36.7 °C) (01/23/23 0452)  Pulse: 75 (01/23/23 0452)  Resp: 18 (01/23/23 0452)  BP: (!) 180/79 (01/23/23 0452)  SpO2: 96 % (01/23/23 0452)   Vital Signs (24h Range):  Temp:  [96 °F (35.6 °C)-98.5 °F (36.9 °C)] 98 °F (36.7 °C)  Pulse:  [74-87] 75  Resp:  [17-20] 18  SpO2:  [91 %-96 %] 96 %  BP: (141-192)/(67-87) 180/79     Weight: 61.6 kg (135 lb 12.9 oz) (01/23/23 0218)  Body mass index is 25.66 kg/m².  Body surface area is 1.63 meters squared.    I/O last 3 completed shifts:  In: 100 [P.O.:100]  Out: -     Physical Exam  Vitals and nursing note reviewed.   Constitutional:       General: She is awake. She is not in acute distress.     Appearance: Normal appearance. She is overweight. She is not ill-appearing or diaphoretic.   HENT:      Head: Normocephalic and atraumatic.      Right Ear: External ear normal.      Left Ear: External ear normal.      Nose: Nose normal.      Mouth/Throat:      Mouth: Mucous  membranes are moist.      Pharynx: Oropharynx is clear. No oropharyngeal exudate or posterior oropharyngeal erythema.   Eyes:      General: No scleral icterus.        Right eye: No discharge.         Left eye: No discharge.      Extraocular Movements: Extraocular movements intact.      Conjunctiva/sclera: Conjunctivae normal.   Cardiovascular:      Rate and Rhythm: Normal rate.      Heart sounds: Murmur heard.   Systolic murmur is present with a grade of 1/6.     No friction rub. No gallop.      Comments: Bilateral pitting lower extremity edema which extends proximally.  Pulmonary:      Effort: Pulmonary effort is normal. No respiratory distress.      Breath sounds: No wheezing, rhonchi or rales.   Chest:      Comments: Tunneled HD catheter to right chest wall. Tenderness noted.  Abdominal:      General: Bowel sounds are normal. There is no distension.      Palpations: Abdomen is soft.      Tenderness: There is no abdominal tenderness.   Genitourinary:     Comments: Purewick in place.  Musculoskeletal:      Cervical back: Neck supple.      Right lower le+ Pitting Edema present.      Left lower le+ Pitting Edema present.   Skin:     General: Skin is warm and dry.      Capillary Refill: Capillary refill takes less than 2 seconds.      Coloration: Skin is not jaundiced.      Findings: No erythema.   Neurological:      General: No focal deficit present.      Mental Status: She is alert. Mental status is at baseline.      Cranial Nerves: No cranial nerve deficit.   Psychiatric:         Mood and Affect: Mood normal.         Behavior: Behavior normal. Behavior is cooperative.       Significant Labs:  BMP:   Recent Labs   Lab 23  0630   GLU 78      K 3.9      CO2 30*   BUN 79*   CREATININE 5.8*   CALCIUM 9.1   MG 2.0       CBC:   Recent Labs   Lab 23  0630   WBC 13.80*   RBC 2.97*   HGB 8.2*   HCT 26.9*      MCV 91   MCH 27.6   MCHC 30.5*       CMP:   Recent Labs   Lab 23  0040  01/22/23  0330 01/23/23  0630   GLU 78   < > 78   CALCIUM 8.9   < > 9.1   ALBUMIN 2.4*  --   --       < > 145   K 4.0   < > 3.9   CO2 24   < > 30*      < > 101   BUN 73*   < > 79*   CREATININE 5.7*   < > 5.8*    < > = values in this interval not displayed.       Coagulation:   Recent Labs   Lab 01/19/23  2246 01/20/23  0413 01/23/23  0630   INR 1.1  --   --    APTT 33.7*   < > 46.3*    < > = values in this interval not displayed.       LFTs:   Recent Labs   Lab 01/21/23  0509   ALBUMIN 2.4*       Microbiology Results (last 7 days)       Procedure Component Value Units Date/Time    Blood culture [[id number]] Collected: 01/11/23 0958    Order Status: Completed Specimen: Blood Updated: 01/16/23 1212     Blood Culture, Routine No growth after 5 days.    Narrative:      Collection has been rescheduled by [email or web address] at 01/11/2023 09:23 Reason:   Patient unavailable is a hard stick [place]zy [id number]  Collection has been rescheduled by [email or web address] at 01/11/2023 09:23 Reason:   Patient unavailable is a hard stick Suzy [id number]    Blood culture [[id number]] Collected: 01/11/23 0958    Order Status: Completed Specimen: Blood Updated: 01/16/23 1212     Blood Culture, Routine No growth after 5 days.    Narrative:      Collection has been rescheduled by [email or web address] at 01/11/2023 09:23 Reason:   Patient unavailable is a hard stick [place] [id number]  Collection has been rescheduled by [email or web address] at 01/11/2023 09:23 Reason:   Patient unavailable is a hard stick [place] [id number]          Specimen (24h ago, onward)      None          Significant Imaging:  I have reviewed all imagining in the last 24 hours.    Assessment/Plan:     * Bacteremia due to Enterococcus  - management per ID and primary  - repeat blood cultures NGTD    Acute deep vein thrombosis (DVT) of non-extremity vein - subclavian  - management per primary team  - remains on heparin infusion    Anemia due to chronic kidney disease  - goal hemoglobin 10-12,   - most recent iron panel with iron 15,  transferrin 138, TIBC 204, 7% saturation and ferritin 378  - transfuse for hemoglobin < 7.0  - defer IV iron in light of bacteremia, currently receiving EPO    Primary pauci-immune necrotizing and crescentic glomerulonephritis  Acute renal failure on dialysis  Microscopic polyangiitis  Miscroscopic polyangiitis with renal (biopsy proven pauci immune necrotizing & crescentic glomerulonephritis) and pulmonary involvement s/p Solumedrol 1,000 mg IV x3 doses, PLEX x5 sessions (10/26/2022, 10/27/2022, 10/29/2022, 10/30/2022, 11/01/2022, 11/02/2022, 11/03/2022), Rituximab 375 mg/m^2 x4 (600 mg) on 10/27/2022, 11/03/2922,11/10/2022,11/172022) with cyclophosphamide 7.5 mg/kg on 10/27/2022 and 11/10/2022 (every 14 days). Now iHD for which she receives HD on one but usually twice weekly for metabolic clearance via tunneled HD catheter roughly x2 a week with last HD Friday (01/06/2023).    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  2)  Necrotizing cresentic glomerulonephritis/[id number] necrotizing cresecentic glomerulonephritis.  3)  Focal acute pyelonephritis.  4)  Mild-to-moderate arterionephrosclerosis    Plan/Recommendations:  - TDC replaced 1/17 however occlusive DVTs on that side involving internal jugular, subclavian and brachial veins which is surmised to be culprit for TDC not working, IR re-consulted plans for tunneled HD catheter replacement today (1/23)  - plan for HD following TDC replacement  - would stop sodium bicarbonate and Renvela for now  - can continue Lasix 40 mg PO daily for now  - daily RFPs and magnesium  - currently on prednisone 5 mg daily with atovaquone 1.5 grams faily for PJP prophylaxis   - renal diet if not NPO  - strict I/Os and daily weights  -  renally dose all medications to eGFR  - avoid nephrotoxic agents when feasible (i.e. intra-arterial contrast, NSAIDs, supra-therapeutic vancomycin troughs, etc.)    Primary hypertension  - management per primary team    Hypothyroid  - management per primary team    Thank you for your consult. I will follow-up with patient. Please contact us if you have any additional questions.    Pj Paz MD  Nephrology  [facility] - Intensive Care ([place])

## 2023-01-23 NOTE — PROGRESS NOTES
[facility] ([address] Medicine  Progress Note    Patient Name: Kristin Goodman  MRN: [id number]  Patient Class: IP- Inpatient   Admission Date: 1/9/2023  Length of Stay: 14 days  Attending Physician: Miguel Snider MD  Primary Care Provider: Lori Hernandez MD        Subjective:     Principal Problem:Bacteremia due to Enterococcus        HPI:  77-year-old  woman with HTN, hypothyroidism, HFpEF, and osteoarthritis and new acute renal failure due to new diagnosis of Microscopic Polyangiitis s/p renal biopsy and requiring hemodialysis is transferred from [facility] for concerns of new bacteremia.     She was hospitalized from 10/17/22-12/13/22 then transferred to [facility].  Microscopic polyangiitis with pulmonary and renal involvement had suffered respiratory failure with diffuse alveolar hemorrhage, gi bleeding, and renal replacement therapy. S/p Rheumatology consultation and pulse IV steroids + plasmapheresis with Transfusion medicine started on Rituximab and Cyclophosphamide.  Continues on Steroid taper for immunosuppression.     More recently earlier this week noted to have a urine culture grow ESBL E.coli Cystitis, started on meropenem, then she had blood cultures from 1/5 and 1/7 that have grown Enterococcus (amp sensitive) vancomycin started today, patient had sluggish HD catheter with dialysis.   Also ED report that patient may have had syncope during HD today.     She is transferred for continued infectious workup and management as well as removal of tunneled HD line for line holiday.       Overview/Hospital Course:  Seen by ID and nephrology. Plan for line holiday and IR consulted.      Interval History:  Patient does not have any new complaints.  Plan for TDC placement with IR today.  Resume diet postprocedure as appropriate.  Nephrology following.  Review of Systems  Objective:     Vital Signs (Most Recent):  Temp: 99.3 °F (37.4 °C) (01/23/23 1143)  Pulse:  72 (01/23/23 1143)  Resp: 18 (01/23/23 1143)  BP: (!) 154/81 (01/23/23 1143)  SpO2: (!) 94 % (01/23/23 1143)   Vital Signs (24h Range):  Temp:  [97.8 °F (36.6 °C)-99.3 °F (37.4 °C)] 99.3 °F (37.4 °C)  Pulse:  [69-82] 72  Resp:  [18-20] 18  SpO2:  [94 %-96 %] 94 %  BP: (135-192)/(63-87) 154/81     Weight: 61.6 kg (135 lb 12.9 oz)  Body mass index is 25.66 kg/m².    Intake/Output Summary (Last 24 hours) at 1/23/2023 1411  Last data filed at 1/23/2023 0000  Gross per 24 hour   Intake 100 ml   Output --   Net 100 ml      Physical Exam  Vitals reviewed.   Constitutional:       General: She is not in acute distress.     Appearance: Normal appearance. She is not toxic-appearing.   HENT:      Head: Normocephalic and atraumatic.   Eyes:      General: No scleral icterus.  Cardiovascular:      Rate and Rhythm: Normal rate and regular rhythm.      Pulses: Normal pulses.      Heart sounds: No murmur heard.  Pulmonary:      Effort: Pulmonary effort is normal. No respiratory distress.      Breath sounds: No wheezing, rhonchi or rales.   Chest:      Comments: R TDC  Abdominal:      General: Bowel sounds are normal. There is no distension.      Tenderness: There is no abdominal tenderness. There is no guarding.   Musculoskeletal:      Right lower leg: No edema.      Left lower leg: No edema.   Skin:     General: Skin is warm.   Neurological:      General: No focal deficit present.      Mental Status: She is alert and oriented to person, place, and time. Mental status is at baseline.   Psychiatric:         Behavior: Behavior normal.         Thought Content: Thought content normal.           Assessment/Plan:      * Bacteremia due to Enterococcus  Positive blood cxs 1/5, 1/7, 1/9. Received 1 gm IV vancomycin at LTAC on 1/10.   HD tunelled cath removed 1/10.  Also has midline from LTAC - removed. Surface echo - no vegetation.  -infectious disease consultation appreciated  -last surveillance bcxs 1/11/23 NGTD  -continue Vanc  -CT  abd/pelvis with oral contrast per ID recs - negative  -Per ID - 2-weeks vanc from negative bcxs/line removal - end date 1/24    Acute deep vein thrombosis (DVT) of non-extremity vein - subclavian  Heparin drip started 1/19 pending procedures to re-establish HD access.  Ultrasound bilateral upper extremity t:    Pauci-immune vasculitis  Continue steroid tape    Urinary tract infection due to extended-spectrum beta lactamase (ESBL) producing Escherichia coli  Urine culture from 1/05 with ESBL E.coli   -Ertapenem renal dosing 500mg IV q24 ordered, completed 5 days (1/5-1/10). Missed dose on 1/9 due to transfer to hospital    Anemia due to chronic kidney disease  Has required transfusions during recent inpatient/LTAC stays   -was receiving EPO infusion at nephrology direction.   Hb 6.7 on 01/16.  Ordered a unit of blood.  Consent obtained.  Continue to monitor CBC.  Goal for transfusion hemoglobin less than 7.    Primary pauci-immune necrotizing and crescentic glomerulonephritis  Patient with acute kidney injury likely due to microscopic polyangiits with granulomatosis (MPA) WILFREDO is currently stable. Labs reviewed- Renal function/electrolytes with Estimated Creatinine Clearance: 7.3 mL/min (A) (based on SCr of 5.7 mg/dL (H)). according to latest data. Monitor urine output and serial BMP and adjust therapy as needed. Avoid nephrotoxins and renally dose meds for GFR listed above.  Had complex course with DAH, requiring pulse dose steroids, plasmapheresis and then rituximab and cyclophosphamide  -continue prednisone taper  -continue atovaquone for PJP ppx  -will need outpatient Rheum follow up    Gastric reflux  Continue BID PPI    Dysphagia  Continue renal diet   Re-consulting SLP    Acute renal failure on dialysis  -due to Microscopic Polyangiitis  -HD on hold due to line holiday. HDS, euvolemic, labs reviewed - no emergent indication for HD today  -bcxs ng x 48 hours, ID is ok with hd cath insertion.   - IR consulted.  S/p permcath placement  on 1/17/23   - Nephrology following.  Plans for hemodialysis delayed due to access issues.  - US of UE showed DVTs in R and L UE.   - CXR:The dialysis catheter remains in position  -plan for TDC replacement on 01/23 by IR.    Microscopic polyangiitis  -continue steroid taper   -patient is on OI prophylaxis with atovaquone 1500mg po daily  -continued on Protonix 40mg po bid while on glucocorticoids.     Impaired mobility  OT/PT - plan for SNF    Chronic diastolic heart failure  Has preserved EF   -HD was primary volume maintenance   -continue Lasix 40mg po daily - consider higher or more frequent doses are required during her LIne holiday     Syncope  Report of possible syncope with HD,  Dizzy spells noted per LTAC notes - pt s/p MRI brain on 12/8 w/o acute findings   -neurology prior eval has recommended PT/OT for vestibular therapy  -Future outpatient cardiology visit for possible tilt-table eval.   -refer to neurology at discharge for BPPV follow up    Primary hypertension  Continue carvedilol  -home lisinopril is on hold due to her WILFREDO    Hypothyroid  Continue levothyroxine 25mcg       VTE Risk Mitigation (From admission, onward)         Ordered     heparin (porcine) injection 1,000 Units  As needed (PRN)         01/23/23 1321     heparin 25,000 units in dextrose 5% (100 units/ml) IV bolus from bag - ADDITIONAL PRN BOLUS - 60 units/kg  As needed (PRN)        Question:  Heparin Infusion Adjustment (DO NOT MODIFY ANSWER)  Answer:  \\[email or web address]\epic\Images\Pharmacy\HeparinInfusions\heparin LOW INTENSITY nomogram for [facility] [id number].pdf    01/19/23 2201     heparin 25,000 units in dextrose 5% (100 units/ml) IV bolus from bag - ADDITIONAL PRN BOLUS - 30 units/kg  As needed (PRN)        Question:  Heparin Infusion Adjustment (DO NOT MODIFY ANSWER)  Answer:  \\[email or web address]\epic\Images\Pharmacy\HeparinInfusions\heparin LOW INTENSITY nomogram for [facility] [id number].pdf    01/19/23 2201     heparin 25,000 units  in dextrose 5% 250 mL (100 units/mL) infusion LOW INTENSITY nomogram - [facility]  Continuous        Question Answer Comment   Heparin Infusion Adjustment (DO NOT MODIFY ANSWER) [email or web address]\epic\Images\Pharmacy\HeparinInfusions\heparin LOW INTENSITY nomogram for [facility] [id number].pdf    Begin at (in units/kg/hr) 12        01/19/23 2201     heparin (porcine) injection 1,000 Units  As needed (PRN)         01/17/23 1421     heparin (porcine) injection 1,000 Units  As needed (PRN)         01/09/23 2330     Place sequential compression device  Until discontinued         01/09/23 2330                Discharge Planning   ANTHONY: 1/24/2023     Code Status: Full Code   Is the patient medically ready for discharge?: No    Reason for patient still in hospital (select all that apply): Patient trending condition and Pending disposition  Discharge Plan A: Skilled Nursing Facility   Discharge Delays: None known at this time              Miguel Snider MD  Department of Hospital Medicine   [facility] - Intensive Care ([place])

## 2023-01-23 NOTE — PT/OT/SLP PROGRESS
Speech Language Pathology      Kristin Goodman  MRN: [id number]    Patient not seen today secondary to pt NPO for procedure. SLP will follow up.   1/23/2023

## 2023-01-23 NOTE — PLAN OF CARE
01/23/23 0850   Post-Acute Status   Post-Acute Authorization Placement   Post-Acute Placement Status Referrals Sent   Coverage [employer]   Discharge Delays None known at this time   Discharge Plan   Discharge Plan A Skilled Nursing Facility   Discharge Plan B Home Health     Per Fawad at [facility], they have auth for her to come.  They will write in [place] everything they need from us.    9:10 AM    Called [facility] [phone]; Inst that [facility], and [facility] have accepted this pt.  Inst that MD says pt probably medically ready for d/c today.  [facility] feels like she's not d/t clotting issues.  Requested if she can contest the d/c.  Inst yes, she can.    Kimberley Guerra, ALIZAN, BS, RN, CCM

## 2023-01-23 NOTE — PT/OT/SLP PROGRESS
Physical Therapy Co-Treatment  Co-treatment with OT for maximal pt participation, safety, and activity tolerance    Patient Name:  Kristin Goodman   MRN:  [id number]  Admitting Diagnosis:  Bacteremia due to Enterococcus   Recent Surgery: * No surgery found *    Admit Date: 1/9/2023  Length of Stay: 14 days    Recommendations:     Discharge Recommendations:  home health PT   Discharge Equipment Recommendations: walker, rolling, bedside commode   Barriers to discharge: None    Assessment:     Kristin Goodman is a 77 y.o. female admitted with a medical diagnosis of Bacteremia due to Enterococcus.  She presents with the following impairments/functional limitations:  weakness, impaired functional mobility, gait instability, impaired endurance, impaired balance, impaired self care skills, decreased lower extremity function.     Pt agreeable to therapy, eager to get home, happy to hear that we are evaluating to see if she will be safe to discharge home instead of rehab. Pt SBA for STS from chair and toilet as well as for ambulation in hallway with RW. Pt safe to discharge home with home health, recs updated and team notified. Continue progressing gait and begin to wean from walker as able.     Rehab Prognosis: Good; patient would benefit from acute skilled PT services to address these deficits and reach maximum level of function.    Recent Surgery: * No surgery found *      Treatment Tolerated: Well    Highest level of mobility achieved this visit: 200ft RW SBA    Activity with RN/PCT: ambulate with one person assist    Plan:     During this hospitalization, patient to be seen 3 x/week to address the identified rehab impairments via gait training, therapeutic activities, therapeutic exercises, neuromuscular re-education and progress toward the following goals:    Plan of Care Expires:  02/09/23    Subjective     CRISTINE To notified prior to session. No family present upon PT entrance into room.    Chief Complaint:  "none  Patient/Family Comments/goals: "I'm ready to get home, my dog misses me"  Pain/Comfort:  Pain Rating 1: 0/10      Objective:     Additional staff present: ANNE Dick    Patient found up in chair with: peripheral IV   Cognition:   Alert and Cooperative  Command following: Follows multistep verbal commands  Fluency: clear/fluent  General Precautions: Standard, Cardiac fall, aspiration   Orthopedic Precautions:N/A   Braces: N/A   Body mass index is 25.66 kg/m².  Oxygen Device: Room Air      Vitals: BP (!) 169/76   Pulse 62   Temp 99.3 °F (37.4 °C) (Oral)   Resp (!) 28   Ht 5' 1" (1.549 m)   Wt 61.6 kg (135 lb 12.9 oz)   SpO2 98%   Breastfeeding No   BMI 25.66 kg/m²     Outcome Measures:  AM-PAC 6 CLICK MOBILITY  Turning over in bed (including adjusting bedclothes, sheets and blankets)?: 4  Sitting down on and standing up from a chair with arms (e.g., wheelchair, bedside commode, etc.): 4  Moving from lying on back to sitting on the side of the bed?: 4  Moving to and from a bed to a chair (including a wheelchair)?: 3  Need to walk in hospital room?: 3  Climbing 3-5 steps with a railing?: 2  Basic Mobility Total Score: 20     Functional Mobility:    Bed Mobility:   Pt found/returned to bedside chair    Transfers:   Sit <> Stand Transfer: stand by assistance with rolling walker   Stand <> Sit Transfer: stand by assistance with rolling walker   [id number] from EOB    Standing Balance:  Assistance Level Required: Stand-by Assistance  Patient used: rolling walker   Time: 15 min  Postural deviations noted: no deviations noted      Gait:  Patient ambulated: 200ft   Patient required: standy by assistance  Patient used:  rolling walker   Gait Pattern observed: swing-through gait  Gait Deviation(s): decreased step length and decreased obey  Impairments due to: decreased endurance    Education:  Time provided for education, counseling and discussion of health disposition in regards to patient's current status  All " questions answered within PT scope of practice and to patient's satisfaction  PT role in POC to address current functional deficits  Pt educated on proper body mechanics, safety techniques, and energy conservation with PT facilitation and cueing throughout session    Patient left up in chair with all lines intact, call button in reach, and RN notified.    GOALS:   Multidisciplinary Problems       Physical Therapy Goals          Problem: Physical Therapy    Goal Priority Disciplines Outcome Goal Variances Interventions   Physical Therapy Goal     PT, PT/OT Ongoing, Progressing     Description: Goals to be met by: 23     Patient will increase functional independence with mobility by performin. Sit to stand transfer with Supervision  2. Gait  x 150 feet with Supervision using Rolling Walker.   3. Stand for 8 minutes with Supervision using Rolling Walker                       Time Tracking:     PT Received On: 23  PT Start Time: 0955     PT Stop Time: 1018  PT Total Time (min): 23 min     Billable Minutes:   Gait Training 12 min and Therapeutic Activity 11 min    Treatment Type: Treatment  PT/PTA: PT       Khai Velasco, PT, DPT  2023

## 2023-01-23 NOTE — PLAN OF CARE
Problem: Occupational Therapy  Goal: Occupational Therapy Goal  Description: Goals to be met by: 1/17/2023 (1 week)  Goals remain appropriate  and extended to 01 31--23    Patient will increase functional independence with ADLs by performing:    UE Dressing with Towner.  LE Dressing with Towner.  Grooming while standing at sink with Towner.  Toileting from toilet with Towner for hygiene and clothing management.   Step transfer with Towner  Toilet transfer to toilet with Towner.    Outcome: Adequate for Care Transition

## 2023-01-23 NOTE — PLAN OF CARE
IR recovery complete, pt alert and awake. VSS. Dressings CDI. Pt ready for transport back to floor. Report called to CRISTINE To.
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
## 2023-01-23 NOTE — ASSESSMENT & PLAN NOTE
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 Now iHD for which she receives HD on one but usually twice weekly for metabolic clearance via tunneled HD catheter roughly x2 a week with last HD Friday (01/06/2023).    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  3)  Focal acute pyelonephritis.  4)  Mild-to-moderate arterionephrosclerosis    Plan/Recommendations:  - TDC replaced 1/17 however occlusive DVTs on that side involving internal jugular, subclavian and brachial veins which is surmised to be culprit for TDC not working, IR re-consulted plans for tunneled HD catheter replacement today (1/23)  - plan for HD following TDC replacement  - would stop sodium bicarbonate and Renvela for now  - can continue Lasix 40 mg PO daily for now  - daily RFPs and magnesium  - currently on prednisone 5 mg daily with atovaquone 1.5 grams faily for PJP prophylaxis   - renal diet if not NPO  - strict I/Os and daily weights  - renally dose all medications to eGFR  - avoid nephrotoxic agents when feasible (i.e. intra-arterial contrast, NSAIDs, supra-therapeutic vancomycin troughs, etc.)

## 2023-01-23 NOTE — PLAN OF CARE
Pt arrived to [facility] for hd line plcmnt. Pt oriented to unit and staff. Plan of care reviewed with patient, patient verbalizes understanding. Comfort measures utilized. Pt safely transferred from stretcher to procedural table. Fall risk reviewed with patient, fall risk interventions maintained. Safety strap applied, positioner pillows utilized to minimize pressure points. Blankets applied. Pt prepped and draped utilizing standard sterile technique. Patient placed on continuous monitoring, as required by sedation policy. Timeouts completed utilizing standard universal time-out, per department and facility policy. RN to remain at bedside, continuous monitoring maintained. Pt resting comfortably. Denies pain/discomfort. Will continue to monitor. See flow sheets for monitoring, medication administration, and updates.

## 2023-01-23 NOTE — PLAN OF CARE
IR recovery complete.VSS. Dressings CDI. Pt. Ready for transport back to floor. Report called to CRISTINE To

## 2023-01-24 LAB
ANION GAP SERPL CALC-SCNC: 13 MMOL/L (ref 8–16)
APTT BLDCRRT: 31.9 SEC (ref 21–32)
APTT BLDCRRT: 37.5 SEC (ref 21–32)
APTT BLDCRRT: 66.7 SEC (ref 21–32)
APTT BLDCRRT: 82.5 SEC (ref 21–32)
BASOPHILS # BLD AUTO: 0.04 K/UL (ref 0–0.2)
BASOPHILS NFR BLD: 0.3 % (ref 0–1.9)
BUN SERPL-MCNC: 73 MG/DL (ref 8–23)
CALCIUM SERPL-MCNC: 8.9 MG/DL (ref 8.7–10.5)
CHLORIDE SERPL-SCNC: 101 MMOL/L (ref 95–110)
CO2 SERPL-SCNC: 30 MMOL/L (ref 23–29)
CREAT SERPL-MCNC: 6.2 MG/DL (ref 0.5–1.4)
DIFFERENTIAL METHOD: ABNORMAL
EOSINOPHIL # BLD AUTO: 0.1 K/UL (ref 0–0.5)
EOSINOPHIL NFR BLD: 0.7 % (ref 0–8)
ERYTHROCYTE [DISTWIDTH] IN BLOOD BY AUTOMATED COUNT: 15.9 % (ref 11.5–14.5)
EST. GFR  (NO RACE VARIABLE): 6.6 ML/MIN/1.73 M^2
GLUCOSE SERPL-MCNC: 88 MG/DL (ref 70–110)
HCT VFR BLD AUTO: 26.4 % (ref 37–48.5)
HGB BLD-MCNC: 8.3 G/DL (ref 12–16)
IMM GRANULOCYTES # BLD AUTO: 0.16 K/UL (ref 0–0.04)
IMM GRANULOCYTES NFR BLD AUTO: 1.3 % (ref 0–0.5)
LYMPHOCYTES # BLD AUTO: 3 K/UL (ref 1–4.8)
LYMPHOCYTES NFR BLD: 24.2 % (ref 18–48)
MAGNESIUM SERPL-MCNC: 2 MG/DL (ref 1.6–2.6)
MCH RBC QN AUTO: 27.8 PG (ref 27–31)
MCHC RBC AUTO-ENTMCNC: 31.4 G/DL (ref 32–36)
MCV RBC AUTO: 88 FL (ref 82–98)
MONOCYTES # BLD AUTO: 1.5 K/UL (ref 0.3–1)
MONOCYTES NFR BLD: 12.1 % (ref 4–15)
NEUTROPHILS # BLD AUTO: 7.5 K/UL (ref 1.8–7.7)
NEUTROPHILS NFR BLD: 61.4 % (ref 38–73)
NRBC BLD-RTO: 0 /100 WBC
PHOSPHATE SERPL-MCNC: 4 MG/DL (ref 2.7–4.5)
PLATELET # BLD AUTO: 361 K/UL (ref 150–450)
PMV BLD AUTO: 11.1 FL (ref 9.2–12.9)
POTASSIUM SERPL-SCNC: 3.6 MMOL/L (ref 3.5–5.1)
RBC # BLD AUTO: 2.99 M/UL (ref 4–5.4)
SODIUM SERPL-SCNC: 144 MMOL/L (ref 136–145)
VANCOMYCIN SERPL-MCNC: 19.5 UG/ML
WBC # BLD AUTO: 12.25 K/UL (ref 3.9–12.7)

## 2023-01-24 PROCEDURE — 63600175 PHARM REV CODE 636 W HCPCS: Mod: JG | Performed by: INTERNAL MEDICINE

## 2023-01-24 PROCEDURE — 25000003 PHARM REV CODE 250: Performed by: HOSPITALIST

## 2023-01-24 PROCEDURE — 63600175 PHARM REV CODE 636 W HCPCS: Mod: JG | Performed by: STUDENT IN AN ORGANIZED HEALTH CARE EDUCATION/TRAINING PROGRAM

## 2023-01-24 PROCEDURE — 99232 PR SUBSEQUENT HOSPITAL CARE,LEVL II: ICD-10-PCS | Mod: ,,, | Performed by: STUDENT IN AN ORGANIZED HEALTH CARE EDUCATION/TRAINING PROGRAM

## 2023-01-24 PROCEDURE — 36415 COLL VENOUS BLD VENIPUNCTURE: CPT | Performed by: STUDENT IN AN ORGANIZED HEALTH CARE EDUCATION/TRAINING PROGRAM

## 2023-01-24 PROCEDURE — 36593 DECLOT VASCULAR DEVICE: CPT

## 2023-01-24 PROCEDURE — 83735 ASSAY OF MAGNESIUM: CPT | Performed by: STUDENT IN AN ORGANIZED HEALTH CARE EDUCATION/TRAINING PROGRAM

## 2023-01-24 PROCEDURE — 63600175 PHARM REV CODE 636 W HCPCS: Performed by: STUDENT IN AN ORGANIZED HEALTH CARE EDUCATION/TRAINING PROGRAM

## 2023-01-24 PROCEDURE — 85730 THROMBOPLASTIN TIME PARTIAL: CPT | Mod: 91 | Performed by: STUDENT IN AN ORGANIZED HEALTH CARE EDUCATION/TRAINING PROGRAM

## 2023-01-24 PROCEDURE — 99232 SBSQ HOSP IP/OBS MODERATE 35: CPT | Mod: ,,, | Performed by: STUDENT IN AN ORGANIZED HEALTH CARE EDUCATION/TRAINING PROGRAM

## 2023-01-24 PROCEDURE — 85025 COMPLETE CBC W/AUTO DIFF WBC: CPT | Performed by: INTERNAL MEDICINE

## 2023-01-24 PROCEDURE — 90935 HEMODIALYSIS ONE EVALUATION: CPT

## 2023-01-24 PROCEDURE — 99232 PR SUBSEQUENT HOSPITAL CARE,LEVL II: ICD-10-PCS | Mod: ,,, | Performed by: PHYSICIAN ASSISTANT

## 2023-01-24 PROCEDURE — 99232 SBSQ HOSP IP/OBS MODERATE 35: CPT | Mod: ,,, | Performed by: HOSPITALIST

## 2023-01-24 PROCEDURE — 99232 SBSQ HOSP IP/OBS MODERATE 35: CPT | Mod: ,,, | Performed by: PHYSICIAN ASSISTANT

## 2023-01-24 PROCEDURE — 63600175 PHARM REV CODE 636 W HCPCS: Performed by: HOSPITALIST

## 2023-01-24 PROCEDURE — 80048 BASIC METABOLIC PNL TOTAL CA: CPT | Performed by: STUDENT IN AN ORGANIZED HEALTH CARE EDUCATION/TRAINING PROGRAM

## 2023-01-24 PROCEDURE — 25000003 PHARM REV CODE 250: Performed by: STUDENT IN AN ORGANIZED HEALTH CARE EDUCATION/TRAINING PROGRAM

## 2023-01-24 PROCEDURE — 92526 ORAL FUNCTION THERAPY: CPT

## 2023-01-24 PROCEDURE — 99232 PR SUBSEQUENT HOSPITAL CARE,LEVL II: ICD-10-PCS | Mod: ,,, | Performed by: HOSPITALIST

## 2023-01-24 PROCEDURE — 84100 ASSAY OF PHOSPHORUS: CPT | Performed by: STUDENT IN AN ORGANIZED HEALTH CARE EDUCATION/TRAINING PROGRAM

## 2023-01-24 PROCEDURE — 80202 ASSAY OF VANCOMYCIN: CPT | Performed by: STUDENT IN AN ORGANIZED HEALTH CARE EDUCATION/TRAINING PROGRAM

## 2023-01-24 PROCEDURE — 12000002 HC ACUTE/MED SURGE SEMI-PRIVATE ROOM

## 2023-01-24 RX ORDER — HEPARIN SODIUM,PORCINE/D5W 25000/250
11 INTRAVENOUS SOLUTION INTRAVENOUS CONTINUOUS
Status: DISCONTINUED | OUTPATIENT
Start: 2023-01-24 | End: 2023-01-26

## 2023-01-24 RX ORDER — HEPARIN SODIUM 10000 [USP'U]/100ML
17 INJECTION, SOLUTION INTRAVENOUS CONTINUOUS
Status: DISCONTINUED | OUTPATIENT
Start: 2023-01-24 | End: 2023-01-24

## 2023-01-24 RX ADMIN — ACETAMINOPHEN 650 MG: 325 TABLET ORAL at 12:01

## 2023-01-24 RX ADMIN — PANTOPRAZOLE SODIUM 40 MG: 40 TABLET, DELAYED RELEASE ORAL at 04:01

## 2023-01-24 RX ADMIN — MAGNESIUM OXIDE TAB 400 MG (241.3 MG ELEMENTAL MG) 400 MG: 400 (241.3 MG) TAB at 12:01

## 2023-01-24 RX ADMIN — PREDNISONE 5 MG: 5 TABLET ORAL at 12:01

## 2023-01-24 RX ADMIN — PANTOPRAZOLE SODIUM 40 MG: 40 TABLET, DELAYED RELEASE ORAL at 06:01

## 2023-01-24 RX ADMIN — LEVOTHYROXINE SODIUM 25 MCG: 25 TABLET ORAL at 05:01

## 2023-01-24 RX ADMIN — HEPARIN SODIUM 1000 UNITS: 5000 INJECTION INTRAVENOUS; SUBCUTANEOUS at 11:01

## 2023-01-24 RX ADMIN — ERYTHROPOIETIN 4000 UNITS: 4000 INJECTION, SOLUTION INTRAVENOUS; SUBCUTANEOUS at 03:01

## 2023-01-24 RX ADMIN — HEPARIN SODIUM 13 UNITS/KG/HR: 10000 INJECTION, SOLUTION INTRAVENOUS at 11:01

## 2023-01-24 RX ADMIN — QUETIAPINE FUMARATE 12.5 MG: 25 TABLET ORAL at 09:01

## 2023-01-24 RX ADMIN — VANCOMYCIN HYDROCHLORIDE 500 MG: 500 INJECTION, POWDER, LYOPHILIZED, FOR SOLUTION INTRAVENOUS at 04:01

## 2023-01-24 RX ADMIN — WHITE PETROLATUM: 1.75 OINTMENT TOPICAL at 12:01

## 2023-01-24 RX ADMIN — CLONIDINE HYDROCHLORIDE 0.1 MG: 0.1 TABLET ORAL at 04:01

## 2023-01-24 RX ADMIN — FLUTICASONE PROPIONATE 50 MCG: 50 SPRAY, METERED NASAL at 09:01

## 2023-01-24 RX ADMIN — ALTEPLASE 4 MG: 2.2 INJECTION, POWDER, LYOPHILIZED, FOR SOLUTION INTRAVENOUS at 09:01

## 2023-01-24 RX ADMIN — HEPARIN SODIUM 11 UNITS/KG/HR: 10000 INJECTION, SOLUTION INTRAVENOUS at 09:01

## 2023-01-24 RX ADMIN — ATOVAQUONE 1500 MG: 750 SUSPENSION ORAL at 12:01

## 2023-01-24 RX ADMIN — LIDOCAINE 1 PATCH: 50 PATCH CUTANEOUS at 05:01

## 2023-01-24 RX ADMIN — Medication 1 TABLET: at 12:01

## 2023-01-24 NOTE — PROGRESS NOTES
[facility] ([place] [address])  Nephrology  Progress Note    Patient Name: Kristin Goodman  MRN: [id number]  Admission Date: 1/9/2023  Hospital Length of Stay: 15 days  Attending Provider: Miguel Snider MD   Primary Care Physician: Lori Hernandez MD  Principal Problem:Bacteremia due to Enterococcus    Subjective:     HPI: Ms Goodman is a 77-year-old woman with hypertension, hypothyroidism, HFpEF (most recent TTE with EF 65% with G1DD), pulmonary hypertension, current ESBL E. coli UTI, osteoarthritis, chronic back pain, DONAVAN and microscopic polyangiitis complicated base on biopsy from 10/26 by renal failure, GIB and respiratory failure with history of diffuse alveolar hemorrhage s/p IV Solumedrol, PLEX x5 sessions, Rituximab x4 doses and cyclophosphamide however now  just on steroid taper (cyclophosphamide appears to be hold secondary to anemia) now iHD dependent twice weekly via tunneled HD catheter for metabolic clearance (follows with Dr. Mojica with [facility]) who presented from [facility] for TDC removal in the setting of positive blood cultures growing Enterococcus faecalis and Bacillus species from cultures obtained on 1/5 & 1/7. In addition patient with reported syncopal event and sluggish flows on HD on 1/9 (incomplete session, daughter attributes to iron infusion) with last full session of iHD being on 1/6. She reports still making good urine without any urinary complaints today. Nephrology has been consulted for inpatient HD needs.      Interval History: Patient seen and examined this AM while attempting to perform HD. Underwent TDC exchange yesterday however on same side as known DVTs. Unable to perform HD via current TDC despite tPA. Tentative plans for TDC to left tomorrow. Afebrile overnight with pulse ranging from 70-60s bpm. Systolic blood pressures ranging from 180-130s mmHg. She is saturating +92% on room air with three unmeasured voids. Leukocytosis now resolved. BUN 73  from 79 and creatinine 6.2 from 5.8.     Review of patient's allergies indicates:   Allergen Reactions    Ampicillin     Peaches [peach (prunus persica)] Other (See Comments)     Pt unable to state type of reaction. Information obtained from daughter who states she was informed of allergy from patient.    Penicillins      Other reaction(s): Hives, anaphylaxis    Sulfa (sulfonamide antibiotics) Rash and Hives     Current Facility-Administered Medications   Medication Frequency    0.9%  NaCl infusion (for blood administration) Q24H PRN    0.9%  NaCl infusion Once    0.9%  NaCl infusion Once    acetaminophen tablet 650 mg Q4H PRN    albuterol-ipratropium 2.5 mg-0.5 mg/3 mL nebulizer solution 3 mL Q4H PRN    aluminum-magnesium hydroxide 200-200 mg/5 mL suspension 30 mL QID PRN    atovaquone 750 mg/5 mL oral liquid 1,500 mg Daily    B-complex with vitamin C tablet 1 tablet Daily    bisacodyL suppository 10 mg Daily PRN    cloNIDine tablet 0.1 mg Q8H PRN    epoetin hira injection 4,000 Units Every Tues, Thurs, Sat    fluticasone propionate 50 mcg/actuation nasal spray 50 mcg BID    furosemide tablet 40 mg Daily    heparin (porcine) injection 1,000 Units PRN    heparin (porcine) injection 1,000 Units PRN    heparin 25,000 units in dextrose 5% (100 units/ml) IV bolus from bag - ADDITIONAL PRN BOLUS - 30 units/kg PRN    heparin 25,000 units in dextrose 5% (100 units/ml) IV bolus from bag - ADDITIONAL PRN BOLUS - 60 units/kg PRN    heparin 25,000 units in dextrose 5% 250 mL (100 units/mL) infusion LOW INTENSITY nomogram - [facility] Continuous    levothyroxine tablet 25 mcg Before breakfast    LIDOcaine 5 % patch 1 patch Q24H    magnesium oxide tablet 400 mg Daily    meclizine tablet 25 mg TID PRN    melatonin tablet 6 mg Nightly PRN    methocarbamoL tablet 500 mg TID PRN    naloxone 0.4 mg/mL injection 0.02 mg PRN    ondansetron injection 4 mg Q8H PRN    pantoprazole EC tablet 40 mg BID AC     predniSONE tablet 5 mg Daily    prochlorperazine injection Soln 5 mg Q6H PRN    quetiapine split tablet 12.5 mg QHS    senna-docusate 8.6-50 mg per tablet 1 tablet BID PRN    simethicone chewable tablet 80 mg QID PRN    sodium chloride 0.9% bolus 250 mL 250 mL PRN    sodium chloride 0.9% bolus 250 mL 250 mL PRN    sodium chloride 0.9% flush 10 mL PRN    sodium chloride 0.9% flush 10 mL Q6H PRN    sucralfate tablet 1 g TID PRN    tiZANidine tablet 4 mg BID PRN    traMADoL tablet 50 mg Q8H PRN    vancomycin - pharmacy to dose pharmacy to manage frequency    white petrolatum 41 % ointment Daily     Facility-Administered Medications Ordered in Other Encounters   Medication Frequency    celecoxib capsule 400 mg Once    fentaNYL 50 mcg/mL injection  mcg PRN    LIDOcaine (PF) 10 mg/ml (1%) injection 10 mg Once PRN    LIDOcaine (PF) 10 mg/ml (1%) injection 10 mg Once    midazolam (VERSED) 1 mg/mL injection 0.5-4 mg PRN    ropivacaine 0.2% [facility] PainPRO Pump infusion 500 ML Continuous       Objective:     Vital Signs (Most Recent):  Temp: 98.9 °F (37.2 °C) (01/24/23 0340)  Pulse: 71 (01/24/23 0722)  Resp: 18 (01/24/23 0340)  BP: 131/62 (01/24/23 0600)  SpO2: (!) 92 % (01/24/23 0340)   Vital Signs (24h Range):  Temp:  [97.2 °F (36.2 °C)-99.3 °F (37.4 °C)] 98.9 °F (37.2 °C)  Pulse:  [56-74] 71  Resp:  [12-28] 18  SpO2:  [92 %-100 %] 92 %  BP: (131-183)/(58-81) 131/62     Weight: 63.3 kg (139 lb 8.8 oz) (01/24/23 0340)  Body mass index is 26.37 kg/m².  Body surface area is 1.65 meters squared.    I/O last 3 completed shifts:  In: 542.4 [P.O.:440; I.V.:70; IV Piggyback:32.4]  Out: -     Physical Exam  Vitals and nursing note reviewed.   Constitutional:       General: She is awake. She is not in acute distress.     Appearance: Normal appearance. She is overweight. She is not ill-appearing or diaphoretic.   HENT:      Head: Normocephalic and atraumatic.      Right Ear: External ear normal.      Left Ear:  External ear normal.      Nose: Nose normal.      Mouth/Throat:      Mouth: Mucous membranes are moist.      Pharynx: Oropharynx is clear. No oropharyngeal exudate or posterior oropharyngeal erythema.   Eyes:      General: No scleral icterus.        Right eye: No discharge.         Left eye: No discharge.      Extraocular Movements: Extraocular movements intact.      Conjunctiva/sclera: Conjunctivae normal.   Cardiovascular:      Rate and Rhythm: Normal rate.      Heart sounds: Murmur heard.   Systolic murmur is present with a grade of 1/6.     No friction rub. No gallop.      Comments: Bilateral pitting lower extremity edema which extends proximally.  Pulmonary:      Effort: Pulmonary effort is normal. No respiratory distress.      Breath sounds: No wheezing, rhonchi or rales.   Chest:      Comments: Tunneled HD catheter to right chest wall.   Abdominal:      General: Bowel sounds are normal. There is no distension.      Palpations: Abdomen is soft.      Tenderness: There is no abdominal tenderness.   Genitourinary:     Comments: Purewick in place.  Musculoskeletal:      Cervical back: Neck supple.      Right lower le+ Pitting Edema present.      Left lower le+ Pitting Edema present.   Skin:     General: Skin is warm and dry.      Capillary Refill: Capillary refill takes less than 2 seconds.      Coloration: Skin is not jaundiced.      Findings: No erythema.   Neurological:      General: No focal deficit present.      Mental Status: She is alert. Mental status is at baseline.      Cranial Nerves: No cranial nerve deficit.   Psychiatric:         Mood and Affect: Mood normal.         Behavior: Behavior normal. Behavior is cooperative.       Significant Labs:  BMP:   Recent Labs   Lab 23  0549   GLU 88      K 3.6      CO2 30*   BUN 73*   CREATININE 6.2*   CALCIUM 8.9   MG 2.0       CBC:   Recent Labs   Lab 23  0549   WBC 12.25   RBC 2.99*   HGB 8.3*   HCT 26.4*      MCV 88   MCH  27.8   MCHC 31.4*       CMP:   Recent Labs   Lab 01/21/23  0509 01/22/23  0330 01/24/23  0549   GLU 78   < > 88   CALCIUM 8.9   < > 8.9   ALBUMIN 2.4*  --   --       < > 144   K 4.0   < > 3.6   CO2 24   < > 30*      < > 101   BUN 73*   < > 73*   CREATININE 5.7*   < > 6.2*    < > = values in this interval not displayed.       Coagulation:   Recent Labs   Lab 01/19/23  2246 01/20/23  0413 01/24/23  0549   INR 1.1  --   --    APTT 33.7*   < > 82.5*    < > = values in this interval not displayed.       LFTs:   Recent Labs   Lab 01/21/23  0509   ALBUMIN 2.4*       Microbiology Results (last 7 days)       ** No results found for the last 168 hours. **          Specimen (24h ago, onward)      None          Significant Imaging:  I have reviewed all imagining in the last 24 hours.    Assessment/Plan:     * Bacteremia due to Enterococcus  - management per ID and primary  - repeat blood cultures NGTD    Acute deep vein thrombosis (DVT) of non-extremity vein - subclavian  - management per primary team  - remains on heparin infusion    Urinary tract infection due to extended-spectrum beta lactamase (ESBL) producing Escherichia coli  - Infectious Disease consulted and following  - management per primary team    Anemia due to chronic kidney disease  - goal hemoglobin 10-12,   - most recent iron panel with iron 15, transferrin 138, TIBC 204, 7% saturation and ferritin 378  - transfuse for hemoglobin < 7.0  - defer IV iron in light of recent bacteremia, currently receiving EPO    Primary pauci-immune necrotizing and crescentic glomerulonephritis  Acute renal failure on dialysis  Microscopic polyangiitis  Miscroscopic polyangiitis with renal (biopsy proven pauci immune necrotizing & crescentic glomerulonephritis) and pulmonary involvement s/p Solumedrol 1,000 mg IV x3 doses, PLEX x5 sessions (10/26/2022, 10/27/2022, 10/29/2022, 10/30/2022, 11/01/2022, 11/02/2022, 11/03/2022), Rituximab 375 mg/m^2 x4 (600 mg) on  10/27/2022, 11/03/2922,11/10/2022,11/172022) with cyclophosphamide 7.5 mg/kg on 10/27/2022 and 11/10/2022 (every 14 days). Now iHD for which she receives HD on one but usually twice weekly for metabolic clearance via tunneled HD catheter roughly x2 a week with last HD Friday (01/06/2023).    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  2)  Necrotizing cresentic glomerulonephritis/[id number] necrotizing cresecentic glomerulonephritis.  3)  Focal acute pyelonephritis.  4)  Mild-to-moderate arterionephrosclerosis    Plan/Recommendations:  - right-sided TDC replaced 1/17 however occlusive DVTs on that side involving internal jugular, subclavian and brachial veins which is surmised to be culprit for TDC not working, IR re-consulted underwent second right-sided tunneled HD catheter placement on 1/23  - HD attempted on 1/24 however unable unable to perform HD despite swapping lines, on continuous heparin infusion and instilling tPA  - tentative plans for likely left-sided TDC tomorrow per IR with trial of HD thereafter   - can continue Lasix 40 mg PO daily for now  - daily RFPs and magnesium  - currently on prednisone 5 mg daily with atovaquone 1.5 grams faily for PJP prophylaxis   - renal diet if not NPO  - strict I/Os and daily weights  - renally dose all medications to eGFR  - avoid nephrotoxic agents when feasible (i.e. intra-arterial contrast, NSAIDs, supra-therapeutic vancomycin troughs, etc.)    Primary hypertension  - management per primary team    Hypothyroid  - management per primary team    Thank you for your consult. I will follow-up with patient. Please contact us if you have any additional questions.    Pj Paz MD  Nephrology  [facility]  [facility]

## 2023-01-24 NOTE — ASSESSMENT & PLAN NOTE
- goal hemoglobin 10-12,   - most recent iron panel with iron 15, transferrin 138, TIBC 204, 7% saturation and ferritin 378  - transfuse for hemoglobin < 7.0  - defer IV iron in light of recent bacteremia, currently receiving EPO

## 2023-01-24 NOTE — NURSING
0701- heparin gtt placed on pause for 1hr, drip rate not changed. After pause finished drip rate needs decreased by 3units/kg/hr before restarting, per protocol. Will report to dayskarishma COLUNGA.

## 2023-01-24 NOTE — PROGRESS NOTES
[facility] ([address] Medicine  Progress Note    Patient Name: Kristin Goodman  MRN: [id number]  Patient Class: IP- Inpatient   Admission Date: 1/9/2023  Length of Stay: 15 days  Attending Physician: Miguel Snider MD  Primary Care Provider: Lori Hernandez MD        Subjective:     Principal Problem:Bacteremia due to Enterococcus        HPI:  77-year-old  woman with HTN, hypothyroidism, HFpEF, and osteoarthritis and new acute renal failure due to new diagnosis of Microscopic Polyangiitis s/p renal biopsy and requiring hemodialysis is transferred from [facility] for concerns of new bacteremia.     She was hospitalized from 10/17/22-12/13/22 then transferred to [facility].  Microscopic polyangiitis with pulmonary and renal involvement had suffered respiratory failure with diffuse alveolar hemorrhage, gi bleeding, and renal replacement therapy. S/p Rheumatology consultation and pulse IV steroids + plasmapheresis with Transfusion medicine started on Rituximab and Cyclophosphamide.  Continues on Steroid taper for immunosuppression.     More recently earlier this week noted to have a urine culture grow ESBL E.coli Cystitis, started on meropenem, then she had blood cultures from 1/5 and 1/7 that have grown Enterococcus (amp sensitive) vancomycin started today, patient had sluggish HD catheter with dialysis.   Also ED report that patient may have had syncope during HD today.     She is transferred for continued infectious workup and management as well as removal of tunneled HD line for line holiday.       Overview/Hospital Course:  Seen by ID and nephrology. Plan for line holiday and IR consulted.      Interval History:  Patient does not have any new complaints today.  Unfortunately, TDC placed on 01/23 did not work during hemodialysis today.  Consulted IR again, plan for repeat TDC placement tomorrow likely left-sided this time.  NPO midnight.  PT recommending home health  PT. Patient will need a hemodialysis chair set up as outpatient.   following.    Review of Systems  Objective:     Vital Signs (Most Recent):  Temp: 98.7 °F (37.1 °C) (01/24/23 0810)  Pulse: 66 (01/24/23 1000)  Resp: 17 (01/24/23 0810)  BP: (!) 146/69 (01/24/23 1000)  SpO2: (!) 93 % (01/24/23 0810)   Vital Signs (24h Range):  Temp:  [97.2 °F (36.2 °C)-98.9 °F (37.2 °C)] 98.7 °F (37.1 °C)  Pulse:  [56-74] 66  Resp:  [12-28] 17  SpO2:  [92 %-100 %] 93 %  BP: (131-183)/(58-81) 146/69     Weight: 63.3 kg (139 lb 8.8 oz)  Body mass index is 26.37 kg/m².    Intake/Output Summary (Last 24 hours) at 1/24/2023 1351  Last data filed at 1/24/2023 0437  Gross per 24 hour   Intake 442.4 ml   Output --   Net 442.4 ml      Physical Exam  Vitals reviewed.   Constitutional:       General: She is not in acute distress.     Appearance: Normal appearance. She is not toxic-appearing.   HENT:      Head: Normocephalic and atraumatic.   Eyes:      General: No scleral icterus.  Cardiovascular:      Rate and Rhythm: Normal rate and regular rhythm.      Pulses: Normal pulses.      Heart sounds: No murmur heard.  Pulmonary:      Effort: Pulmonary effort is normal. No respiratory distress.      Breath sounds: No wheezing, rhonchi or rales.   Chest:      Comments: R TDC  Abdominal:      General: Bowel sounds are normal. There is no distension.      Tenderness: There is no abdominal tenderness. There is no guarding.   Musculoskeletal:      Right lower leg: No edema.      Left lower leg: No edema.   Skin:     General: Skin is warm.   Neurological:      General: No focal deficit present.      Mental Status: She is alert and oriented to person, place, and time. Mental status is at baseline.   Psychiatric:         Behavior: Behavior normal.         Thought Content: Thought content normal.             Assessment/Plan:      * Bacteremia due to Enterococcus  Positive blood cxs 1/5, 1/7, 1/9. Received 1 gm IV vancomycin at LTAC on 1/10.   HD  tunelled cath removed 1/10.  Also has midline from LTAC - removed. Surface echo - no vegetation. ID consulted. Last surveillance bcxs 1/11/23 NGTD  Per ID - 2-weeks vanc from negative bcxs/line removal - end date 1/24. Completed.    Acute deep vein thrombosis (DVT) of non-extremity vein - subclavian  Heparin drip started 1/19 pending procedures to re-establish HD access.  Ultrasound bilateral upper extremity t:    Pauci-immune vasculitis  Continue steroid taper    Urinary tract infection due to extended-spectrum beta lactamase (ESBL) producing Escherichia coli  Urine culture from 1/05 with ESBL E.coli   -Ertapenem renal dosing 500mg IV q24 ordered, completed 5 days (1/5-1/10). Missed dose on 1/9 due to transfer to hospital    Anemia due to chronic kidney disease  Has required transfusions during recent inpatient/LTAC stays   -was receiving EPO infusion at nephrology direction.   Hb 6.7 on 01/16.  Ordered a unit of blood.  Consent obtained.  Continue to monitor CBC.  Goal for transfusion hemoglobin less than 7.    Primary pauci-immune necrotizing and crescentic glomerulonephritis  Patient with acute kidney injury likely due to microscopic polyangiits with granulomatosis (MPA) WILFREDO is currently stable. Labs reviewed- Renal function/electrolytes with Estimated Creatinine Clearance: 7.3 mL/min (A) (based on SCr of 5.7 mg/dL (H)). according to latest data. Monitor urine output and serial BMP and adjust therapy as needed. Avoid nephrotoxins and renally dose meds for GFR listed above.  Had complex course with DAH, requiring pulse dose steroids, plasmapheresis and then rituximab and cyclophosphamide  -continue prednisone taper  -continue atovaquone for PJP ppx  -will need outpatient Rheum follow up    Gastric reflux  Continue BID PPI    Dysphagia  Continue renal diet   SLP following     Acute renal failure on dialysis  -due to Microscopic Polyangiitis  -HD on hold due to line holiday. HDS, euvolemic, labs reviewed - no  emergent indication for HD today  -bcxs ng x 48 hours, ID is ok with hd cath insertion.   - IR consulted. S/p permcath placement  on 1/17/23   - Nephrology following.  Plans for hemodialysis delayed due to access issues.  - US of UE showed DVTs in R and L UE.   - CXR:The dialysis catheter remains in position  - S/p R TDC replacement on 01/23 by IR.  On 01/24, TDC was not working again for hemodialysis.  Consulted IR again.  Plan for TDC replacement tomorrow likely from left side this time.  NPO midnight    Microscopic polyangiitis  -continue steroid taper   -patient is on OI prophylaxis with atovaquone 1500mg po daily  -continued on Protonix 40mg po bid while on glucocorticoids.     Impaired mobility  OT/PT - plan for SNF    Chronic diastolic heart failure  Has preserved EF   -HD was primary volume maintenance   -continue Lasix 40mg po daily - consider higher or more frequent doses are required during her LIne holiday     Syncope  Report of possible syncope with HD,  Dizzy spells noted per LTAC notes - pt s/p MRI brain on 12/8 w/o acute findings   -neurology prior eval has recommended PT/OT for vestibular therapy  -Future outpatient cardiology visit for possible tilt-table eval.   -refer to neurology at discharge for BPPV follow up    Primary hypertension  Continue carvedilol  -home lisinopril is on hold due to her WILFREDO    Hypothyroid  Continue levothyroxine 25mcg     VTE Risk Mitigation (From admission, onward)         Ordered     heparin 25,000 units in dextrose 5% (100 units/ml) IV bolus from bag - ADDITIONAL PRN BOLUS - 30 units/kg  As needed (PRN)        Question:  Heparin Infusion Adjustment (DO NOT MODIFY ANSWER)  Answer:  [email or web address]\[email or web address]\Images\Pharmacy\HeparinInfusions\heparin LOW INTENSITY nomogram for [facility] [id number]    01/24/23 0914     heparin 25,000 units in dextrose 5% (100 units/ml) IV bolus from bag - ADDITIONAL PRN BOLUS - 60 units/kg  As needed (PRN)        Question:  Heparin Infusion Adjustment  (DO NOT MODIFY ANSWER)  Answer:  [email or web address]\epic\Images\Pharmacy\HeparinInfusions\heparin LOW INTENSITY nomogram for [facility] [id number]    01/24/23 0914     heparin 25,000 units in dextrose 5% 250 mL (100 units/mL) infusion LOW INTENSITY nomogram - [facility]  Continuous        Question Answer Comment   Heparin Infusion Adjustment (DO NOT MODIFY ANSWER) \\[email or web address]\epic\Images\Pharmacy\HeparinInfusions\heparin LOW INTENSITY nomogram for [facility] [id number]    Begin at (in units/kg/hr) 12        01/24/23 0911     heparin (porcine) injection 1,000 Units  As needed (PRN)         01/23/23 1321     heparin (porcine) injection 1,000 Units  As needed (PRN)         01/17/23 1421     heparin (porcine) injection 1,000 Units  As needed (PRN)         01/09/23 2330     Place sequential compression device  Until discontinued         01/09/23 2330                Discharge Planning   ANTHONY: 1/25/2023     Code Status: Full Code   Is the patient medically ready for discharge?: No    Reason for patient still in hospital (select all that apply): Patient trending condition  Discharge Plan A: Home Health   Discharge Delays: None known at this time              Miguel Snider MD  Department of Hospital Medicine   [facility] - Intensive Care ([place]-[facility])

## 2023-01-24 NOTE — NURSING
Pt heparin restarted this shift. No significant changes this shift. Vss. PRN given for HTN. Pt no room air.

## 2023-01-24 NOTE — PLAN OF CARE
01/24/23 1034   Post-Acute Status   Post-Acute Authorization Home Health   Discharge Plan   Discharge Plan A Home Health     Per notes, this pt's d/c plan has changed to home health.  Messaged Kimberley Colin with [facility] that this pt's recs have changed to HH; she replied she would look in to this. She is not contact for PHN.    Calling [facility] [phone].  Can they accept this patient and which HD do they use? They took message and will have supervisor call CM back.    1:52 PM Per MD message: her new TDC is also not working today so we'l have to keep her until it works.    1:54 PM Fawad from [facility] called - she thinks they use HD in [place].  They need updated info in order to see if they can still take her.  Fawad called back, states she was looking at the wrong patient.  They have PHN auth, which is good until the 26th.  She needs updated info.  She will call pt's daughter.    Calling [facility] [phone] to instruct that PT/OT are recommending HH; if she's not ready to go by the 26th, it is unlikely that the auth will be renewed, in which case she would need to go home with HH.  She has progressed here.  LVM requesting call back.      Kimberley Guerra, ALIZAN, BS, RN, CCM

## 2023-01-24 NOTE — PT/OT/SLP PROGRESS
Physical Therapy  Not Seen  Patient Name:  Kristin Goodman   MRN:  [id number]  Admitting Diagnosis:  Bacteremia due to Enterococcus   Recent Surgery: * No surgery found *    Admit Date: 1/9/2023  Length of Stay: 15 days    Patient not seen on this date for treatment - patient gone to dialysis this morning. She then declined this afternoon due to increased pain and fatigue PT will continue to follow as patient is able to safely participate. Please continue progressive mobility as appropriate.    Discharge Disposition Recommendation: SKYLER Garcia PT, DPT  1/24/2023

## 2023-01-24 NOTE — PROGRESS NOTES
Patient arrived in the unit via bed.  VSS.   HD TX started via RIJ HD catheter.  High arterial alarms on the machine .  Reversed lines and flushed, high alarms still on the machine.  Notified Nephrology.

## 2023-01-24 NOTE — PT/OT/SLP PROGRESS
Speech Language Pathology Treatment  Discharge    Patient Name:  Kritsin Goodman   MRN:  [id number]  Admitting Diagnosis: Bacteremia due to Enterococcus    Recommendations:                 General Recommendations:  Follow-up not indicated  Diet recommendations:  Regular Diet - IDDSI Level 7, Liquid Diet Level: Thin liquids - IDDSI Level 0   Aspiration Precautions: Standard aspiration precautions   General Precautions: Standard, aspiration, fall  Communication strategies:  none    Subjective     Awake/alert    Pain/Comfort:  Pain Rating 1: 0/10  Pain Rating Post-Intervention 1: 0/10    Respiratory Status: Room air    Objective:     Has the patient been evaluated by SLP for swallowing?   Yes  Keep patient NPO? No     Pt upright in bedside chair eating lunch upon SLP entry. She denies any difficulty with PO intake at this time. Chicken noodle soup tolerated x3 with timely swallow initiation, adequate oral clearance and no overt s/s of airway compromise. Recommend regular diet/thin liquids at this time. No further ST warranted at this time.     Assessment:     Kristin Goodman is a 77 y.o. female with an SLP diagnosis of Dysphagia.     Goals:   Multidisciplinary Problems       SLP Goals          Problem: SLP    Goal Priority Disciplines Outcome   SLP Goal     SLP    Description: Goals expected to be met by 1/29:  1. Pt will tolerate regular diet with thin liquids without overt s/sx airway threat.                        Plan:       Plan of Care expires:  02/20/23  Plan of Care reviewed with:  patient   SLP Follow-Up:  No       Discharge recommendations:  other (see comments) (tbd)       Time Tracking:     SLP Treatment Date:   01/24/23  Speech Start Time:  1315  Speech Stop Time:  1320     Speech Total Time (min):  5 min    Billable Minutes: Treatment Swallowing Dysfunction 5    01/24/2023

## 2023-01-24 NOTE — PROGRESS NOTES
Pharmacokinetic Assessment Follow Up: IV Vancomycin    Vancomycin serum concentration assessment(s):    The random level resulted in 19.5, was drawn correctly and can be used to guide therapy at this time. The measurement is within the desired definitive target range of 15 to 20 mcg/mL.    Vancomycin Regimen Plan:  Tunneled HD catheter replacement yesterday, (1/23), patient scheduled HD today (1/24)  Administer Vancomycin 500 mg once today after dialysis session completed  Final day of vancomycin treatment to complete duration established by infectious disease, no follow up labs needed     Drug levels (last 3 results):  Recent Labs   Lab Result Units 01/22/23  0330 01/23/23  0630 01/24/23  0549   Vancomycin, Random ug/mL 19.4 15.7 19.5         Pharmacy will continue to follow and monitor vancomycin.    Please contact pharmacy at extension [phone] for questions regarding this assessment.    Thank you for the consult,   Paul Vee       Patient brief summary:  Kristin Goodman is a 77 y.o. female initiated on antimicrobial therapy with IV Vancomycin for treatment of bacteremia    The patient's current regimen is pulse dosing.     Drug Allergies:   Review of patient's allergies indicates:   Allergen Reactions    Ampicillin     Peaches [peach (prunus persica)] Other (See Comments)     Pt unable to state type of reaction. Information obtained from daughter who states she was informed of allergy from patient.    Penicillins      Other reaction(s): Hives, anaphylaxis    Sulfa (sulfonamide antibiotics) Rash and Hives       Actual Body Weight:   63.3 kg    Renal Function:   Estimated Creatinine Clearance: 6.7 mL/min (A) (based on SCr of 6.2 mg/dL (H)).,     Dialysis Method (if applicable):  intermittent HD    CBC (last 72 hours):  Recent Labs   Lab Result Units 01/22/23  0330 01/23/23  0630 01/24/23  0549   WBC K/uL 16.89* 13.80* 12.25   Hemoglobin g/dL 9.3* 8.2* 8.3*   Hematocrit % 29.8* 26.9* 26.4*   Platelets K/uL 392 384  361   Gran % % 65.2 64.8 61.4   Lymph % % 20.2 19.8 24.2   Mono % % 11.3 11.4 12.1   Eosinophil % % 0.1 0.5 0.7   Basophil % % 0.2 0.4 0.3   Differential Method  Automated Automated Automated         Metabolic Panel (last 72 hours):  Recent Labs   Lab Result Units 01/22/23  0330 01/23/23  0630 01/24/23  0549   Sodium mmol/L 145 145 144   Potassium mmol/L 3.9 3.9 3.6   Chloride mmol/L 102 101 101   CO2 mmol/L 26 30* 30*   Glucose mg/dL 73 78 88   BUN mg/dL 76* 79* 73*   Creatinine mg/dL 5.7* 5.8* 6.2*   Magnesium mg/dL 1.9 2.0 2.0   Phosphorus mg/dL 4.2 4.5 4.0         Vancomycin Administrations:  vancomycin given in the last 96 hours                     vancomycin (VANCOCIN) 250 mg in dextrose 5 % 100 mL IVPB (mg) 250 mg New Bag 01/21/23 1427    vancomycin (VANCOCIN) 500 mg in dextrose 5 % in water (D5W) 5 % 100 mL IVPB (MB+) (mg) 500 mg New Bag 01/18/23 210                    Microbiologic Results:  Microbiology Results (last 7 days)       ** No results found for the last 168 hours. **

## 2023-01-24 NOTE — ASSESSMENT & PLAN NOTE
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 Now iHD for which she receives HD on one but usually twice weekly for metabolic clearance via tunneled HD catheter roughly x2 a week with last HD Friday (01/06/2023).    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  3)  Focal acute pyelonephritis.  4)  Mild-to-moderate arterionephrosclerosis    Plan/Recommendations:  - right-sided TDC replaced 1/17 however occlusive DVTs on that side involving internal jugular, subclavian and brachial veins which is surmised to be culprit for TDC not working, IR re-consulted underwent second right-sided tunneled HD catheter placement on 1/23  - HD attempted on 1/24 however unable unable to perform HD despite swapping lines, on continuous heparin infusion and instilling tPA  - tentative plans for likely left-sided TDC tomorrow per IR with trial of HD thereafter   - can continue Lasix 40 mg PO daily for now  - daily RFPs and magnesium  - currently on prednisone 5 mg daily with atovaquone 1.5 grams faily for PJP prophylaxis   - renal diet if not NPO  - strict I/Os and daily weights  - renally dose all medications to eGFR  - avoid nephrotoxic agents when feasible (i.e. intra-arterial contrast, NSAIDs, supra-therapeutic  vancomycin troughs, etc.)

## 2023-01-24 NOTE — ASSESSMENT & PLAN NOTE
Positive blood cxs 1/5, 1/7, 1/9. Received 1 gm IV vancomycin at LTAC on 1/10.   HD tunelled cath removed 1/10.  Also has midline from LTAC - removed. Surface echo - no vegetation. ID consulted. Last surveillance bcxs 1/11/23 NGTD  Per ID - 2-weeks vanc from negative bcxs/line removal - end date 1/24. Completed.

## 2023-01-24 NOTE — PROGRESS NOTES
Restarted HD at 1045 am.   High arterial alarms on the machine despite switching lines.  Nephrology aware.
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
## 2023-01-24 NOTE — PROGRESS NOTES
VANCOMYCIN DOSING BY PHARMACY DISCONTINUATION NOTE    Kristin Goodman is a 77 y.o. female who had been consulted for vancomycin dosing.    The pharmacy consult for vancomycin dosing has been discontinued.     Vancomycin Dosing by Pharmacy Consult will sign-off. Please reconsult if necessary. Thank you for allowing us to participate in this patient's care.       Paul Vee, PharmD  Ext. [phone]

## 2023-01-24 NOTE — ASSESSMENT & PLAN NOTE
-due to Microscopic Polyangiitis  -HD on hold due to line holiday. HDS, euvolemic, labs reviewed - no emergent indication for HD today  -bcxs ng x 48 hours, ID is ok with hd cath insertion.   - IR consulted. S/p permcath placement  on 1/17/23   - Nephrology following.  Plans for hemodialysis delayed due to access issues.  - US of UE showed DVTs in R and L UE.   - CXR:The dialysis catheter remains in position  - S/p R TDC replacement on 01/23 by IR.  On 01/24, TDC was not working again for hemodialysis.  Consulted IR again.  Plan for TDC replacement tomorrow likely from left side this time.  NPO midnight

## 2023-01-24 NOTE — NURSING
2200- RN doing pt chart review found heparin gtt was ordered, pt has not been on heparin gtt since before shift change. Pt went to procedure for HD cath placement on day shift, looks to be heparin gtt was turned off for that. This RN saw no order to restart/ hold time after procedure. Talked with OC about finds.  2230- after OC looked through chart Catalina PA notified. Told to restart heparin after aPTT. Waiting for aPTT draw.  2300- aPTT still not done PA notified told to go ahead and restart heparin at previous rate no bolus.   2320- lab at bedside getting aPTT.  Will continue to monitor. Call light in reach. Bed alarm on .

## 2023-01-24 NOTE — SUBJECTIVE & OBJECTIVE
Interval History: Patient seen and examined this AM while attempting to perform HD. Underwent TDC exchange yesterday however on same side as known DVTs. Unable to perform HD via current TDC despite tPA. Tentative plans for TDC to left tomorrow. Afebrile overnight with pulse ranging from 70-60s bpm. Systolic blood pressures ranging from 180-130s mmHg. She is saturating +92% on room air with three unmeasured voids. Leukocytosis now resolved. BUN 73 from 79 and creatinine 6.2 from 5.8.     Review of patient's allergies indicates:   Allergen Reactions    Ampicillin     Peaches [peach (prunus persica)] Other (See Comments)     Pt unable to state type of reaction. Information obtained from daughter who states she was informed of allergy from patient.    Penicillins      Other reaction(s): Hives, anaphylaxis    Sulfa (sulfonamide antibiotics) Rash and Hives     Current Facility-Administered Medications   Medication Frequency    0.9%  NaCl infusion (for blood administration) Q24H PRN    0.9%  NaCl infusion Once    0.9%  NaCl infusion Once    acetaminophen tablet 650 mg Q4H PRN    albuterol-ipratropium 2.5 mg-0.5 mg/3 mL nebulizer solution 3 mL Q4H PRN    aluminum-magnesium hydroxide 200-200 mg/5 mL suspension 30 mL QID PRN    atovaquone 750 mg/5 mL oral liquid 1,500 mg Daily    B-complex with vitamin C tablet 1 tablet Daily    bisacodyL suppository 10 mg Daily PRN    cloNIDine tablet 0.1 mg Q8H PRN    epoetin hira injection 4,000 Units Every Tues, Thurs, Sat    fluticasone propionate 50 mcg/actuation nasal spray 50 mcg BID    furosemide tablet 40 mg Daily    heparin (porcine) injection 1,000 Units PRN    heparin (porcine) injection 1,000 Units PRN    heparin 25,000 units in dextrose 5% (100 units/ml) IV bolus from bag - ADDITIONAL PRN BOLUS - 30 units/kg PRN    heparin 25,000 units in dextrose 5% (100 units/ml) IV bolus from bag - ADDITIONAL PRN BOLUS - 60 units/kg PRN    heparin 25,000 units in dextrose 5% 250 mL (100  units/mL) infusion LOW INTENSITY nomogram - [facility] Continuous    levothyroxine tablet 25 mcg Before breakfast    LIDOcaine 5 % patch 1 patch Q24H    magnesium oxide tablet 400 mg Daily    meclizine tablet 25 mg TID PRN    melatonin tablet 6 mg Nightly PRN    methocarbamoL tablet 500 mg TID PRN    naloxone 0.4 mg/mL injection 0.02 mg PRN    ondansetron injection 4 mg Q8H PRN    pantoprazole EC tablet 40 mg BID AC    predniSONE tablet 5 mg Daily    prochlorperazine injection Soln 5 mg Q6H PRN    quetiapine split tablet 12.5 mg QHS    senna-docusate 8.6-50 mg per tablet 1 tablet BID PRN    simethicone chewable tablet 80 mg QID PRN    sodium chloride 0.9% bolus 250 mL 250 mL PRN    sodium chloride 0.9% bolus 250 mL 250 mL PRN    sodium chloride 0.9% flush 10 mL PRN    sodium chloride 0.9% flush 10 mL Q6H PRN    sucralfate tablet 1 g TID PRN    tiZANidine tablet 4 mg BID PRN    traMADoL tablet 50 mg Q8H PRN    vancomycin - pharmacy to dose pharmacy to manage frequency    white petrolatum 41 % ointment Daily     Facility-Administered Medications Ordered in Other Encounters   Medication Frequency    celecoxib capsule 400 mg Once    fentaNYL 50 mcg/mL injection  mcg PRN    LIDOcaine (PF) 10 mg/ml (1%) injection 10 mg Once PRN    LIDOcaine (PF) 10 mg/ml (1%) injection 10 mg Once    midazolam (VERSED) 1 mg/mL injection 0.5-4 mg PRN    ropivacaine 0.2% [facility] PainPRO Pump infusion 500 ML Continuous       Objective:     Vital Signs (Most Recent):  Temp: 98.9 °F (37.2 °C) (01/24/23 0340)  Pulse: 71 (01/24/23 0722)  Resp: 18 (01/24/23 0340)  BP: 131/62 (01/24/23 0600)  SpO2: (!) 92 % (01/24/23 0340)   Vital Signs (24h Range):  Temp:  [97.2 °F (36.2 °C)-99.3 °F (37.4 °C)] 98.9 °F (37.2 °C)  Pulse:  [56-74] 71  Resp:  [12-28] 18  SpO2:  [92 %-100 %] 92 %  BP: (131-183)/(58-81) 131/62     Weight: 63.3 kg (139 lb 8.8 oz) (01/24/23 0340)  Body mass index is 26.37 kg/m².  Body surface area is 1.65 meters squared.    I/O last 3  completed shifts:  In: 542.4 [P.O.:440; I.V.:70; IV Piggyback:32.4]  Out: -     Physical Exam  Vitals and nursing note reviewed.   Constitutional:       General: She is awake. She is not in acute distress.     Appearance: Normal appearance. She is overweight. She is not ill-appearing or diaphoretic.   HENT:      Head: Normocephalic and atraumatic.      Right Ear: External ear normal.      Left Ear: External ear normal.      Nose: Nose normal.      Mouth/Throat:      Mouth: Mucous membranes are moist.      Pharynx: Oropharynx is clear. No oropharyngeal exudate or posterior oropharyngeal erythema.   Eyes:      General: No scleral icterus.        Right eye: No discharge.         Left eye: No discharge.      Extraocular Movements: Extraocular movements intact.      Conjunctiva/sclera: Conjunctivae normal.   Cardiovascular:      Rate and Rhythm: Normal rate.      Heart sounds: Murmur heard.   Systolic murmur is present with a grade of 1/6.     No friction rub. No gallop.      Comments: Bilateral pitting lower extremity edema which extends proximally.  Pulmonary:      Effort: Pulmonary effort is normal. No respiratory distress.      Breath sounds: No wheezing, rhonchi or rales.   Chest:      Comments: Tunneled HD catheter to right chest wall.   Abdominal:      General: Bowel sounds are normal. There is no distension.      Palpations: Abdomen is soft.      Tenderness: There is no abdominal tenderness.   Genitourinary:     Comments: Purewick in place.  Musculoskeletal:      Cervical back: Neck supple.      Right lower le+ Pitting Edema present.      Left lower le+ Pitting Edema present.   Skin:     General: Skin is warm and dry.      Capillary Refill: Capillary refill takes less than 2 seconds.      Coloration: Skin is not jaundiced.      Findings: No erythema.   Neurological:      General: No focal deficit present.      Mental Status: She is alert. Mental status is at baseline.      Cranial Nerves: No cranial nerve  deficit.   Psychiatric:         Mood and Affect: Mood normal.         Behavior: Behavior normal. Behavior is cooperative.       Significant Labs:  BMP:   Recent Labs   Lab 01/24/23  0549   GLU 88      K 3.6      CO2 30*   BUN 73*   CREATININE 6.2*   CALCIUM 8.9   MG 2.0       CBC:   Recent Labs   Lab 01/24/23  0549   WBC 12.25   RBC 2.99*   HGB 8.3*   HCT 26.4*      MCV 88   MCH 27.8   MCHC 31.4*       CMP:   Recent Labs   Lab 01/21/23  0509 01/22/23  0330 01/24/23  0549   GLU 78   < > 88   CALCIUM 8.9   < > 8.9   ALBUMIN 2.4*  --   --       < > 144   K 4.0   < > 3.6   CO2 24   < > 30*      < > 101   BUN 73*   < > 73*   CREATININE 5.7*   < > 6.2*    < > = values in this interval not displayed.       Coagulation:   Recent Labs   Lab 01/19/23  2246 01/20/23  0413 01/24/23  0549   INR 1.1  --   --    APTT 33.7*   < > 82.5*    < > = values in this interval not displayed.       LFTs:   Recent Labs   Lab 01/21/23  0509   ALBUMIN 2.4*       Microbiology Results (last 7 days)       ** No results found for the last 168 hours. **          Specimen (24h ago, onward)      None          Significant Imaging:  I have reviewed all imagining in the last 24 hours.

## 2023-01-25 LAB
ANION GAP SERPL CALC-SCNC: 14 MMOL/L (ref 8–16)
APTT BLDCRRT: 50.5 SEC (ref 21–32)
APTT BLDCRRT: 56.9 SEC (ref 21–32)
BUN SERPL-MCNC: 61 MG/DL (ref 8–23)
CALCIUM SERPL-MCNC: 8.9 MG/DL (ref 8.7–10.5)
CHLORIDE SERPL-SCNC: 103 MMOL/L (ref 95–110)
CO2 SERPL-SCNC: 23 MMOL/L (ref 23–29)
CREAT SERPL-MCNC: 5.6 MG/DL (ref 0.5–1.4)
EST. GFR  (NO RACE VARIABLE): 7.4 ML/MIN/1.73 M^2
GLUCOSE SERPL-MCNC: 72 MG/DL (ref 70–110)
MAGNESIUM SERPL-MCNC: 2.1 MG/DL (ref 1.6–2.6)
PHOSPHATE SERPL-MCNC: 3.4 MG/DL (ref 2.7–4.5)
POCT GLUCOSE: 122 MG/DL (ref 70–110)
POTASSIUM SERPL-SCNC: 4.1 MMOL/L (ref 3.5–5.1)
SODIUM SERPL-SCNC: 140 MMOL/L (ref 136–145)

## 2023-01-25 PROCEDURE — 25000003 PHARM REV CODE 250: Performed by: HOSPITALIST

## 2023-01-25 PROCEDURE — 94761 N-INVAS EAR/PLS OXIMETRY MLT: CPT

## 2023-01-25 PROCEDURE — 12000002 HC ACUTE/MED SURGE SEMI-PRIVATE ROOM

## 2023-01-25 PROCEDURE — 99900035 HC TECH TIME PER 15 MIN (STAT)

## 2023-01-25 PROCEDURE — 63600175 PHARM REV CODE 636 W HCPCS: Performed by: RADIOLOGY

## 2023-01-25 PROCEDURE — 83735 ASSAY OF MAGNESIUM: CPT | Performed by: STUDENT IN AN ORGANIZED HEALTH CARE EDUCATION/TRAINING PROGRAM

## 2023-01-25 PROCEDURE — 25000003 PHARM REV CODE 250: Performed by: STUDENT IN AN ORGANIZED HEALTH CARE EDUCATION/TRAINING PROGRAM

## 2023-01-25 PROCEDURE — 99232 PR SUBSEQUENT HOSPITAL CARE,LEVL II: ICD-10-PCS | Mod: ,,, | Performed by: HOSPITALIST

## 2023-01-25 PROCEDURE — 99232 SBSQ HOSP IP/OBS MODERATE 35: CPT | Mod: ,,, | Performed by: STUDENT IN AN ORGANIZED HEALTH CARE EDUCATION/TRAINING PROGRAM

## 2023-01-25 PROCEDURE — 63600175 PHARM REV CODE 636 W HCPCS: Performed by: HOSPITALIST

## 2023-01-25 PROCEDURE — 63600175 PHARM REV CODE 636 W HCPCS: Performed by: STUDENT IN AN ORGANIZED HEALTH CARE EDUCATION/TRAINING PROGRAM

## 2023-01-25 PROCEDURE — 90935 HEMODIALYSIS ONE EVALUATION: CPT

## 2023-01-25 PROCEDURE — 80048 BASIC METABOLIC PNL TOTAL CA: CPT | Performed by: STUDENT IN AN ORGANIZED HEALTH CARE EDUCATION/TRAINING PROGRAM

## 2023-01-25 PROCEDURE — 85730 THROMBOPLASTIN TIME PARTIAL: CPT | Performed by: STUDENT IN AN ORGANIZED HEALTH CARE EDUCATION/TRAINING PROGRAM

## 2023-01-25 PROCEDURE — 99232 SBSQ HOSP IP/OBS MODERATE 35: CPT | Mod: ,,, | Performed by: HOSPITALIST

## 2023-01-25 PROCEDURE — 99232 PR SUBSEQUENT HOSPITAL CARE,LEVL II: ICD-10-PCS | Mod: ,,, | Performed by: STUDENT IN AN ORGANIZED HEALTH CARE EDUCATION/TRAINING PROGRAM

## 2023-01-25 PROCEDURE — 84100 ASSAY OF PHOSPHORUS: CPT | Performed by: STUDENT IN AN ORGANIZED HEALTH CARE EDUCATION/TRAINING PROGRAM

## 2023-01-25 RX ORDER — HEPARIN SODIUM 1000 [USP'U]/ML
INJECTION, SOLUTION INTRAVENOUS; SUBCUTANEOUS
Status: COMPLETED | OUTPATIENT
Start: 2023-01-25 | End: 2023-01-25

## 2023-01-25 RX ORDER — HEPARIN SODIUM 1000 [USP'U]/ML
1000 INJECTION, SOLUTION INTRAVENOUS; SUBCUTANEOUS
Status: DISPENSED | OUTPATIENT
Start: 2023-01-25 | End: 2023-01-26

## 2023-01-25 RX ORDER — MIDAZOLAM HYDROCHLORIDE 1 MG/ML
INJECTION INTRAMUSCULAR; INTRAVENOUS
Status: COMPLETED | OUTPATIENT
Start: 2023-01-25 | End: 2023-01-25

## 2023-01-25 RX ORDER — SODIUM CHLORIDE 9 MG/ML
INJECTION, SOLUTION INTRAVENOUS ONCE
Status: COMPLETED | OUTPATIENT
Start: 2023-01-25 | End: 2023-01-25

## 2023-01-25 RX ORDER — FENTANYL CITRATE 50 UG/ML
INJECTION, SOLUTION INTRAMUSCULAR; INTRAVENOUS
Status: COMPLETED | OUTPATIENT
Start: 2023-01-25 | End: 2023-01-25

## 2023-01-25 RX ADMIN — MAGNESIUM OXIDE TAB 400 MG (241.3 MG ELEMENTAL MG) 400 MG: 400 (241.3 MG) TAB at 02:01

## 2023-01-25 RX ADMIN — ACETAMINOPHEN 650 MG: 325 TABLET ORAL at 07:01

## 2023-01-25 RX ADMIN — Medication 1 TABLET: at 09:01

## 2023-01-25 RX ADMIN — FLUTICASONE PROPIONATE 50 MCG: 50 SPRAY, METERED NASAL at 02:01

## 2023-01-25 RX ADMIN — LEVOTHYROXINE SODIUM 25 MCG: 25 TABLET ORAL at 05:01

## 2023-01-25 RX ADMIN — FENTANYL CITRATE 50 MCG: 0.05 INJECTION, SOLUTION INTRAMUSCULAR; INTRAVENOUS at 09:01

## 2023-01-25 RX ADMIN — CLONIDINE HYDROCHLORIDE 0.1 MG: 0.1 TABLET ORAL at 05:01

## 2023-01-25 RX ADMIN — ACETAMINOPHEN 650 MG: 325 TABLET ORAL at 02:01

## 2023-01-25 RX ADMIN — PREDNISONE 5 MG: 5 TABLET ORAL at 02:01

## 2023-01-25 RX ADMIN — HEPARIN SODIUM 4000 UNITS: 1000 INJECTION, SOLUTION INTRAVENOUS; SUBCUTANEOUS at 10:01

## 2023-01-25 RX ADMIN — QUETIAPINE FUMARATE 12.5 MG: 25 TABLET ORAL at 08:01

## 2023-01-25 RX ADMIN — FLUTICASONE PROPIONATE 50 MCG: 50 SPRAY, METERED NASAL at 09:01

## 2023-01-25 RX ADMIN — LIDOCAINE 1 PATCH: 50 PATCH CUTANEOUS at 05:01

## 2023-01-25 RX ADMIN — MIDAZOLAM HYDROCHLORIDE 1 MG: 1 INJECTION, SOLUTION INTRAMUSCULAR; INTRAVENOUS at 09:01

## 2023-01-25 RX ADMIN — SODIUM CHLORIDE: 9 INJECTION, SOLUTION INTRAVENOUS at 03:01

## 2023-01-25 RX ADMIN — ATOVAQUONE 1500 MG: 750 SUSPENSION ORAL at 02:01

## 2023-01-25 RX ADMIN — HEPARIN SODIUM 1000 UNITS: 1000 INJECTION, SOLUTION INTRAVENOUS; SUBCUTANEOUS at 05:01

## 2023-01-25 NOTE — PROGRESS NOTES
Physical Therapy  Not Seen  Patient Name:  Kristin Goodman   MRN:  [id number]  Admitting Diagnosis:  Bacteremia due to Enterococcus   Recent Surgery: * No surgery found *    Admit Date: 1/9/2023  Length of Stay: 16 days     Patient not seen on this date for treatment - patient refused due to pain in R ear and fatigue, preparing for scheduled procedure this AM. PT schedule did not allow for second attempt. PT will continue to follow as patient is able to safely participate. Please continue progressive mobility as appropriate per plan of care.     Discharge Disposition Recommendation: HHPT     Rowena Mann, PT, DPT  1/25/2023

## 2023-01-25 NOTE — ASSESSMENT & PLAN NOTE
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 Now iHD for which she receives HD on one but usually twice weekly for metabolic clearance via tunneled HD catheter roughly x2 a week with last HD Friday (01/06/2023).    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  2)  Necrotizing cresentic glomerulonephritis/[id number] necrotizing cresecentic glomerulonephritis.  3)  Focal acute pyelonephritis.  4)  Mild-to-moderate arterionephrosclerosis    Plan/Recommendations:  - HD today via new left TDC for metabolic clearance, currently functioning well  - anticipate right TDC removal prior to discharge  - can continue Lasix 40 mg PO daily for now  - daily RFPs and magnesium  - currently on prednisone 5 mg daily with atovaquone 1.5 grams faily for PJP prophylaxis   - renal diet if not NPO  - strict I/Os and daily weights  - renally dose all medications to eGFR  - avoid nephrotoxic agents when feasible (i.e. intra-arterial contrast, NSAIDs, supra-therapeutic vancomycin troughs, etc.)

## 2023-01-25 NOTE — PLAN OF CARE
L IJ TDC placement complete. Pt tolerated well. VSS. No signs or symptoms of distress noted.  Dressing to left neck CDI.  Pt will be transferred to [facility]U bed and report to be given at bedside. Floor nurse updated.

## 2023-01-25 NOTE — PROGRESS NOTES
[facility] Care ([address] Medicine  Progress Note    Patient Name: Kirstin Goodman  MRN: [id number]  Patient Class: IP- Inpatient   Admission Date: 1/9/2023  Length of Stay: 16 days  Attending Physician: Miguel Snider MD  Primary Care Provider: Lori Hernandez MD        Subjective:     Principal Problem:Bacteremia due to Enterococcus        HPI:  77-year-old  woman with HTN, hypothyroidism, HFpEF, and osteoarthritis and new acute renal failure due to new diagnosis of Microscopic Polyangiitis s/p renal biopsy and requiring hemodialysis is transferred from [facility] for concerns of new bacteremia.     She was hospitalized from 10/17/22-12/13/22 then transferred to [facility].  Microscopic polyangiitis with pulmonary and renal involvement had suffered respiratory failure with diffuse alveolar hemorrhage, gi bleeding, and renal replacement therapy. S/p Rheumatology consultation and pulse IV steroids + plasmapheresis with Transfusion medicine started on Rituximab and Cyclophosphamide.  Continues on Steroid taper for immunosuppression.     More recently earlier this week noted to have a urine culture grow ESBL E.coli Cystitis, started on meropenem, then she had blood cultures from 1/5 and 1/7 that have grown Enterococcus (amp sensitive) vancomycin started today, patient had sluggish HD catheter with dialysis.   Also ED report that patient may have had syncope during HD today.     She is transferred for continued infectious workup and management as well as removal of tunneled HD line for line holiday.       Overview/Hospital Course:  Seen by ID and nephrology. Plan for line holiday and IR consulted.      Interval History: Scheduled for placement of L TDC by IR today. Nephrology following. Will need HD chair set up as OP prior to discharge. CM aware. Pt rec HH PT     Review of Systems  Objective:     Vital Signs (Most Recent):  Temp: 98.2 °F (36.8 °C) (01/25/23 1015)  Pulse: 74  (01/25/23 1130)  Resp: 16 (01/25/23 1130)  BP: (!) 188/87 (01/25/23 1130)  SpO2: 100 % (01/25/23 1130)   Vital Signs (24h Range):  Temp:  [98 °F (36.7 °C)-98.5 °F (36.9 °C)] 98.2 °F (36.8 °C)  Pulse:  [58-80] 74  Resp:  [12-18] 16  SpO2:  [90 %-100 %] 100 %  BP: (151-190)/(65-89) 188/87     Weight: 63.3 kg (139 lb 8.8 oz)  Body mass index is 26.37 kg/m².    Intake/Output Summary (Last 24 hours) at 1/25/2023 1409  Last data filed at 1/24/2023 1740  Gross per 24 hour   Intake 120 ml   Output --   Net 120 ml      Physical Exam  Vitals reviewed.   Constitutional:       General: She is not in acute distress.     Appearance: Normal appearance. She is not toxic-appearing.   HENT:      Head: Normocephalic and atraumatic.   Eyes:      General: No scleral icterus.  Cardiovascular:      Rate and Rhythm: Normal rate and regular rhythm.      Pulses: Normal pulses.      Heart sounds: No murmur heard.  Pulmonary:      Effort: Pulmonary effort is normal. No respiratory distress.      Breath sounds: No wheezing, rhonchi or rales.   Chest:      Comments: L TDC  Abdominal:      General: Bowel sounds are normal. There is no distension.      Tenderness: There is no abdominal tenderness. There is no guarding.   Musculoskeletal:      Right lower leg: No edema.      Left lower leg: No edema.   Skin:     General: Skin is warm.   Neurological:      General: No focal deficit present.      Mental Status: She is alert and oriented to person, place, and time. Mental status is at baseline.   Psychiatric:         Behavior: Behavior normal.         Thought Content: Thought content normal.             Assessment/Plan:      * Bacteremia due to Enterococcus  Positive blood cxs 1/5, 1/7, 1/9. Received 1 gm IV vancomycin at LTAC on 1/10.   HD tunelled cath removed 1/10.  Also has midline from LTAC - removed. Surface echo - no vegetation. ID consulted. Last surveillance bcxs 1/11/23 NGTD  Per ID - 2-weeks vanc from negative bcxs/line removal - end date  1/24. Completed.    Acute deep vein thrombosis (DVT) of non-extremity vein - subclavian  Heparin drip started 1/19 pending procedures to re-establish HD access.  Ultrasound bilateral upper extremity t:    Pauci-immune vasculitis  Continue steroid taper    Urinary tract infection due to extended-spectrum beta lactamase (ESBL) producing Escherichia coli  Urine culture from 1/05 with ESBL E.coli   -Ertapenem renal dosing 500mg IV q24 ordered, completed 5 days (1/5-1/10). Missed dose on 1/9 due to transfer to hospital    Anemia due to chronic kidney disease  Has required transfusions during recent inpatient/LTAC stays   -was receiving EPO infusion at nephrology direction.   Hb 6.7 on 01/16.  Ordered a unit of blood.  Consent obtained.  Continue to monitor CBC.  Goal for transfusion hemoglobin less than 7.    Primary pauci-immune necrotizing and crescentic glomerulonephritis  Patient with acute kidney injury likely due to microscopic polyangiits with granulomatosis (MPA) WILFREDO is currently stable. Labs reviewed- Renal function/electrolytes with Estimated Creatinine Clearance: 7.3 mL/min (A) (based on SCr of 5.7 mg/dL (H)). according to latest data. Monitor urine output and serial BMP and adjust therapy as needed. Avoid nephrotoxins and renally dose meds for GFR listed above.  Had complex course with DAH, requiring pulse dose steroids, plasmapheresis and then rituximab and cyclophosphamide  -continue prednisone taper  -continue atovaquone for PJP ppx  -will need outpatient Rheum follow up    Gastric reflux  Continue BID PPI    Dysphagia  Continue renal diet   SLP following     Acute renal failure on dialysis  -due to Microscopic Polyangiitis  - IR consulted. S/p permcath placement  on 1/17/23   - US of UE showed DVTs in R and L UE.   - CXR:The dialysis catheter remains in position  - S/p R TDC replacement on 01/23 by IR.  On 01/24, TDC was not working again for hemodialysis.  Consulted IR again.  S/p Left TDC placement on  1/25.   - Nephrology following. Patient will need HD chair set up prior to d/c.    Microscopic polyangiitis  -continue steroid taper   -patient is on OI prophylaxis with atovaquone 1500mg po daily  -continued on Protonix 40mg po bid while on glucocorticoids.     Impaired mobility  OT/PT - plan for SNF    Chronic diastolic heart failure  Has preserved EF   -HD was primary volume maintenance   -continue Lasix 40mg po daily - consider higher or more frequent doses are required during her LIne holiday     Syncope  Report of possible syncope with HD,  Dizzy spells noted per LTAC notes - pt s/p MRI brain on 12/8 w/o acute findings   -neurology prior eval has recommended PT/OT for vestibular therapy  -Future outpatient cardiology visit for possible tilt-table eval.   -refer to neurology at discharge for BPPV follow up    Primary hypertension  Continue carvedilol  -home lisinopril is on hold due to her WILFREDO    Hypothyroid  Continue levothyroxine 25mcg     VTE Risk Mitigation (From admission, onward)         Ordered     heparin (porcine) injection 1,000 Units  As needed (PRN)         01/25/23 1128     heparin 25,000 units in dextrose 5% (100 units/ml) IV bolus from bag - ADDITIONAL PRN BOLUS - 30 units/kg  As needed (PRN)        Question:  Heparin Infusion Adjustment (DO NOT MODIFY ANSWER)  Answer:  [email or web address]\Images\Pharmacy\HeparinInfusions\heparin LOW INTENSITY nomogram for [facility] [id number]    01/24/23 0914     heparin 25,000 units in dextrose 5% (100 units/ml) IV bolus from bag - ADDITIONAL PRN BOLUS - 60 units/kg  As needed (PRN)        Question:  Heparin Infusion Adjustment (DO NOT MODIFY ANSWER)  Answer:  \[email or web address]\Images\Pharmacy\HeparinInfusions\heparin LOW INTENSITY nomogram for [facility] [id number]    01/24/23 0914     heparin 25,000 units in dextrose 5% 250 mL (100 units/mL) infusion LOW INTENSITY nomogram - [facility]  Continuous        Question Answer Comment   Heparin Infusion Adjustment (DO NOT MODIFY  ANSWER) [email or web address]\Images\Pharmacy\HeparinInfusions\heparin LOW INTENSITY nomogram for [facility] [id number]    Begin at (in units/kg/hr) 12        01/24/23 0911     heparin (porcine) injection 1,000 Units  As needed (PRN)         01/23/23 1321     heparin (porcine) injection 1,000 Units  As needed (PRN)         01/17/23 1421     heparin (porcine) injection 1,000 Units  As needed (PRN)         01/09/23 2330     Place sequential compression device  Until discontinued         01/09/23 2330                Discharge Planning   ANTHONY: 1/26/2023     Code Status: Full Code   Is the patient medically ready for discharge?: No    Reason for patient still in hospital (select all that apply): Patient trending condition  Discharge Plan A: Home Health   Discharge Delays: None known at this time              Miguel Snider MD  Department of Hospital Medicine   [facility] - Intensive Care ([place])

## 2023-01-25 NOTE — PLAN OF CARE
Pt arrived to Interventional Radiology room 190 via stretcher for HD line exchange.  Plan of care reviewed with patient, patient verbalized understanding.  Name verified using two identifiers.  Allergies verified.  Labs, orders and consent reviewed on chart.  Pt oriented to unit and staff.  See flow sheet for documentation, monitoring and medication administration. Will continue to monitor.

## 2023-01-25 NOTE — SUBJECTIVE & OBJECTIVE
Interval History: Patient seen and examined on HD following left IJ tunneled HD catheter per IR. New TDC appears to be working well and she is currently tolerating HD without issue. Right TDC remains in place. Afebrile overnight with pulse ranging from 70-60s bpm. Systolic blood pressures ranging from 170-150s mmHg. She is saturating +90% on room air with no documented UOP in the last 24 hours. BUN 61 from 73 and creatinine 5.6 from 6.2.     Review of patient's allergies indicates:   Allergen Reactions    Ampicillin     Peaches [peach (prunus persica)] Other (See Comments)     Pt unable to state type of reaction. Information obtained from daughter who states she was informed of allergy from patient.    Penicillins      Other reaction(s): Hives, anaphylaxis    Sulfa (sulfonamide antibiotics) Rash and Hives     Current Facility-Administered Medications   Medication Frequency    0.9%  NaCl infusion (for blood administration) Q24H PRN    0.9%  NaCl infusion Once    0.9%  NaCl infusion Once    acetaminophen tablet 650 mg Q4H PRN    albuterol-ipratropium 2.5 mg-0.5 mg/3 mL nebulizer solution 3 mL Q4H PRN    aluminum-magnesium hydroxide 200-200 mg/5 mL suspension 30 mL QID PRN    atovaquone 750 mg/5 mL oral liquid 1,500 mg Daily    B-complex with vitamin C tablet 1 tablet Daily    bisacodyL suppository 10 mg Daily PRN    cloNIDine tablet 0.1 mg Q8H PRN    epoetin hira injection 4,000 Units Every Tues, Thurs, Sat    fluticasone propionate 50 mcg/actuation nasal spray 50 mcg BID    furosemide tablet 40 mg Daily    heparin (porcine) injection 1,000 Units PRN    heparin 25,000 units in dextrose 5% (100 units/ml) IV bolus from bag - ADDITIONAL PRN BOLUS - 30 units/kg PRN    heparin 25,000 units in dextrose 5% (100 units/ml) IV bolus from bag - ADDITIONAL PRN BOLUS - 60 units/kg PRN    heparin 25,000 units in dextrose 5% 250 mL (100 units/mL) infusion LOW INTENSITY nomogram - [facility] Continuous    levothyroxine tablet 25 mcg Before  breakfast    LIDOcaine 5 % patch 1 patch Q24H    magnesium oxide tablet 400 mg Daily    meclizine tablet 25 mg TID PRN    melatonin tablet 6 mg Nightly PRN    methocarbamoL tablet 500 mg TID PRN    naloxone 0.4 mg/mL injection 0.02 mg PRN    ondansetron injection 4 mg Q8H PRN    pantoprazole EC tablet 40 mg BID AC    predniSONE tablet 5 mg Daily    prochlorperazine injection Soln 5 mg Q6H PRN    quetiapine split tablet 12.5 mg QHS    senna-docusate 8.6-50 mg per tablet 1 tablet BID PRN    simethicone chewable tablet 80 mg QID PRN    sodium chloride 0.9% bolus 250 mL 250 mL PRN    sodium chloride 0.9% bolus 250 mL 250 mL PRN    sodium chloride 0.9% flush 10 mL PRN    sodium chloride 0.9% flush 10 mL Q6H PRN    sucralfate tablet 1 g TID PRN    tiZANidine tablet 4 mg BID PRN    traMADoL tablet 50 mg Q8H PRN    white petrolatum 41 % ointment Daily     Facility-Administered Medications Ordered in Other Encounters   Medication Frequency    celecoxib capsule 400 mg Once    fentaNYL 50 mcg/mL injection  mcg PRN    LIDOcaine (PF) 10 mg/ml (1%) injection 10 mg Once PRN    LIDOcaine (PF) 10 mg/ml (1%) injection 10 mg Once    midazolam (VERSED) 1 mg/mL injection 0.5-4 mg PRN    ropivacaine 0.2% [facility] PainPRO Pump infusion 500 ML Continuous       Objective:     Vital Signs (Most Recent):  Temp: 98.5 °F (36.9 °C) (01/25/23 0444)  Pulse: 70 (01/25/23 0444)  Resp: 18 (01/25/23 0444)  BP: (!) 174/83 (01/25/23 0444)  SpO2: (!) 90 % (01/25/23 0444)   Vital Signs (24h Range):  Temp:  [98 °F (36.7 °C)-98.7 °F (37.1 °C)] 98.5 °F (36.9 °C)  Pulse:  [58-73] 70  Resp:  [16-18] 18  SpO2:  [90 %-98 %] 90 %  BP: (144-174)/(64-83) 174/83     Weight: 63.3 kg (139 lb 8.8 oz) (01/24/23 0340)  Body mass index is 26.37 kg/m².  Body surface area is 1.65 meters squared.    I/O last 3 completed shifts:  In: 322.4 [P.O.:220; I.V.:70; IV Piggyback:32.4]  Out: -     Physical Exam  Vitals and nursing note reviewed.   Constitutional:       General:  She is awake. She is not in acute distress.     Appearance: Normal appearance. She is overweight. She is not ill-appearing or diaphoretic.   HENT:      Head: Normocephalic and atraumatic.      Right Ear: External ear normal.      Left Ear: External ear normal.      Nose: Nose normal.      Mouth/Throat:      Mouth: Mucous membranes are moist.      Pharynx: Oropharynx is clear. No oropharyngeal exudate or posterior oropharyngeal erythema.   Eyes:      General: No scleral icterus.        Right eye: No discharge.         Left eye: No discharge.      Extraocular Movements: Extraocular movements intact.      Conjunctiva/sclera: Conjunctivae normal.   Cardiovascular:      Rate and Rhythm: Normal rate.      Heart sounds: Murmur heard.   Systolic murmur is present with a grade of 1/6.     No friction rub. No gallop.      Comments: Bilateral pitting lower extremity edema which extends proximally.  Pulmonary:      Effort: Pulmonary effort is normal. No respiratory distress.      Breath sounds: No wheezing, rhonchi or rales.   Chest:      Comments: Tunneled HD catheters to right and left chest wall.   Abdominal:      General: Bowel sounds are normal. There is no distension.      Palpations: Abdomen is soft.      Tenderness: There is no abdominal tenderness.   Musculoskeletal:      Cervical back: Neck supple.      Right lower le+ Pitting Edema present.      Left lower le+ Pitting Edema present.   Skin:     General: Skin is warm and dry.      Capillary Refill: Capillary refill takes less than 2 seconds.      Coloration: Skin is not jaundiced.      Findings: No erythema.   Neurological:      General: No focal deficit present.      Mental Status: She is alert. Mental status is at baseline.      Cranial Nerves: No cranial nerve deficit.   Psychiatric:         Mood and Affect: Mood normal.         Behavior: Behavior normal. Behavior is cooperative.       Significant Labs:  BMP:   Recent Labs   Lab 23  0458   GLU 72   NA  140   K 4.1      CO2 23   BUN 61*   CREATININE 5.6*   CALCIUM 8.9   MG 2.1       CBC:   Recent Labs   Lab 01/24/23  0549   WBC 12.25   RBC 2.99*   HGB 8.3*   HCT 26.4*      MCV 88   MCH 27.8   MCHC 31.4*       CMP:   Recent Labs   Lab 01/21/23  0509 01/22/23  0330 01/25/23  0458   GLU 78   < > 72   CALCIUM 8.9   < > 8.9   ALBUMIN 2.4*  --   --       < > 140   K 4.0   < > 4.1   CO2 24   < > 23      < > 103   BUN 73*   < > 61*   CREATININE 5.7*   < > 5.6*    < > = values in this interval not displayed.       Coagulation:   Recent Labs   Lab 01/19/23  2246 01/20/23  0413 01/25/23  0458   INR 1.1  --   --    APTT 33.7*   < > 50.5*  56.9*    < > = values in this interval not displayed.       LFTs:   Recent Labs   Lab 01/21/23  0509   ALBUMIN 2.4*       Microbiology Results (last 7 days)       ** No results found for the last 168 hours. **          Specimen (24h ago, onward)      None          Significant Imaging:  I have reviewed all imagining in the last 24 hours.

## 2023-01-25 NOTE — PLAN OF CARE
[facility] - Intensive Care ([facility])  Discharge Reassessment    Primary Care Provider: Lori Hernandez MD    Expected Discharge Date: 1/25/2023    Reassessment (most recent)       Discharge Reassessment - 01/25/23 0933          Discharge Reassessment    Assessment Type Discharge Planning Reassessment (P)      Did the patient's condition or plan change since previous assessment? No (P)      Discharge Plan discussed with: Adult children (P)      Communicated ANTHONY with patient/caregiver Yes (P)      Discharge Plan A Home Health (P)      Why the patient remains in the hospital Requires continued medical care (P)         Post-Acute Status    Post-Acute Authorization Home Health (P)                    Calling jus Goodman [phone] to explain that pt no longer qualifies for SNF - qualifies for HH.  What facility is she getting HD at?  CM will sent to [employer] fax [phone].  HH should include PT/OT.  She would need to go live with aunt.  Aunt is retired.  Aunt has fewer stairs.  Address is unknown, [place] [address]. She will call back with exact address. HD before they were doing inside her room in [facility].  Looks like they were from [facility], not sure.  Not ready to be d/c'd yet - they are redoing HD port.  [facility] sets them up with HH.  HH will reach out to pt once agency assigned.  HH can reach out to [facility], aunt's phone number is [phone].    10:10 AM Per [facility], address is [address] [place] [id number]; her name is Padma Paez. Faxed referral to [facility].    1:10 PM Calling Andrew at  [phone] to see which HD's they work with.  Admissions [facility] transferred to .  Use [facility] and [facility].      Messaged MD that pt will need another Hep panel within 30 days.  Calling [facility], see if she needs TB skin test within a certain time and what labs needed to get get HD chair, also to see if they have a chair time? [phone] spoke with Chelsea. Submit through  [facility] [employer], phone number [phone].  Only MWF after 4 PM.  Called ; spoke with Velasquez; pt currently in hospital - not sure if pt has gotten services through [facility] previously November.      Pt is going to , not [place]. new admission d/t she hasn't started with [facility]  she doesn't know what labs are needed.  Transferring to admissions.  [place]  need to know what labs are needed to get a chair; she was previously [facility] can tell me info on her status [phone].  If she needs placement, we can get started.  Calling [facility]. Reached Chelsea Weiss she is not a patient of that facillity.  She doesn't know what labs needed - states to call .  There are 5 clinics on the [facility], they are clinic specific; she could be at one of the other facilities.      1:46 PM [facility] is on [place]; [phone]; [facility] on [place] is [phone].     3:58 PM  Calling jus Goodman [phone] to discuss HD facilities.  She is fine with either [facility] in [facility].      4:04 PM Calling [facility] is on [place]; [phone] - need to set up HD chair - do they have any HD chairs available? No answer.    4:04 PM Calling [facility] on [place] is [phone], [facility] requesting call back.     4:35 PM Rec'd call from [employer] [phone] from [facility].  Pt was previously with [facility], auth is good until 11/2/23.  She will send copy of auth orders to [facility]ending [facility] to [facility].  Calling jus Englishn [phone].  She is fine with [facility] in [facility].  Calling [facility] back to find out which facility pt was at - there are several [facility] in [facility].  Left message requesting call back.     4:59 PM Lauren called back - this pt active with [facility] on [facility] [phone].      Kimberley Guerra, ALIZAN, BS, RN, CCM

## 2023-01-25 NOTE — SUBJECTIVE & OBJECTIVE
Interval History: Scheduled for placement of L TDC by IR today. Nephrology following. Will need HD chair set up as OP prior to discharge. CM aware. Pt rec HH PT     Review of Systems  Objective:     Vital Signs (Most Recent):  Temp: 98.2 °F (36.8 °C) (01/25/23 1015)  Pulse: 74 (01/25/23 1130)  Resp: 16 (01/25/23 1130)  BP: (!) 188/87 (01/25/23 1130)  SpO2: 100 % (01/25/23 1130)   Vital Signs (24h Range):  Temp:  [98 °F (36.7 °C)-98.5 °F (36.9 °C)] 98.2 °F (36.8 °C)  Pulse:  [58-80] 74  Resp:  [12-18] 16  SpO2:  [90 %-100 %] 100 %  BP: (151-190)/(65-89) 188/87     Weight: 63.3 kg (139 lb 8.8 oz)  Body mass index is 26.37 kg/m².    Intake/Output Summary (Last 24 hours) at 1/25/2023 1409  Last data filed at 1/24/2023 1740  Gross per 24 hour   Intake 120 ml   Output --   Net 120 ml      Physical Exam  Vitals reviewed.   Constitutional:       General: She is not in acute distress.     Appearance: Normal appearance. She is not toxic-appearing.   HENT:      Head: Normocephalic and atraumatic.   Eyes:      General: No scleral icterus.  Cardiovascular:      Rate and Rhythm: Normal rate and regular rhythm.      Pulses: Normal pulses.      Heart sounds: No murmur heard.  Pulmonary:      Effort: Pulmonary effort is normal. No respiratory distress.      Breath sounds: No wheezing, rhonchi or rales.   Chest:      Comments: L TDC  Abdominal:      General: Bowel sounds are normal. There is no distension.      Tenderness: There is no abdominal tenderness. There is no guarding.   Musculoskeletal:      Right lower leg: No edema.      Left lower leg: No edema.   Skin:     General: Skin is warm.   Neurological:      General: No focal deficit present.      Mental Status: She is alert and oriented to person, place, and time. Mental status is at baseline.   Psychiatric:         Behavior: Behavior normal.         Thought Content: Thought content normal.

## 2023-01-25 NOTE — PROGRESS NOTES
[facility] ([place] [address])  Nephrology  Progress Note    Patient Name: Kristin Goodman  MRN: [id number]  Admission Date: 1/9/2023  Hospital Length of Stay: 16 days  Attending Provider: Miguel Snider MD   Primary Care Physician: Lori Hernandez MD  Principal Problem:Bacteremia due to Enterococcus    Subjective:     HPI: Ms Goodman is a 77-year-old woman with hypertension, hypothyroidism, HFpEF (most recent TTE with EF 65% with G1DD), pulmonary hypertension, current ESBL E. coli UTI, osteoarthritis, chronic back pain, DONAVAN and microscopic polyangiitis complicated base on biopsy from 10/26 by renal failure, GIB and respiratory failure with history of diffuse alveolar hemorrhage s/p IV Solumedrol, PLEX x5 sessions, Rituximab x4 doses and cyclophosphamide however now  just on steroid taper (cyclophosphamide appears to be hold secondary to anemia) now iHD dependent twice weekly via tunneled HD catheter for metabolic clearance (follows with Dr. Mojica with [facility]) who presented from [facility] for TDC removal in the setting of positive blood cultures growing Enterococcus faecalis and Bacillus species from cultures obtained on 1/5 & 1/7. In addition patient with reported syncopal event and sluggish flows on HD on 1/9 (incomplete session, daughter attributes to iron infusion) with last full session of iHD being on 1/6. She reports still making good urine without any urinary complaints today. Nephrology has been consulted for inpatient HD needs.      Interval History: Patient seen and examined on HD following left IJ tunneled HD catheter per IR. New TDC appears to be working well and she is currently tolerating HD without issue. Right TDC remains in place. Afebrile overnight with pulse ranging from 70-60s bpm. Systolic blood pressures ranging from 170-150s mmHg. She is saturating +90% on room air with no documented UOP in the last 24 hours. BUN 61 from 73 and creatinine 5.6 from 6.2.     Review  of patient's allergies indicates:   Allergen Reactions    Ampicillin     Peaches [peach (prunus persica)] Other (See Comments)     Pt unable to state type of reaction. Information obtained from daughter who states she was informed of allergy from patient.    Penicillins      Other reaction(s): Hives, anaphylaxis    Sulfa (sulfonamide antibiotics) Rash and Hives     Current Facility-Administered Medications   Medication Frequency    0.9%  NaCl infusion (for blood administration) Q24H PRN    0.9%  NaCl infusion Once    0.9%  NaCl infusion Once    acetaminophen tablet 650 mg Q4H PRN    albuterol-ipratropium 2.5 mg-0.5 mg/3 mL nebulizer solution 3 mL Q4H PRN    aluminum-magnesium hydroxide 200-200 mg/5 mL suspension 30 mL QID PRN    atovaquone 750 mg/5 mL oral liquid 1,500 mg Daily    B-complex with vitamin C tablet 1 tablet Daily    bisacodyL suppository 10 mg Daily PRN    cloNIDine tablet 0.1 mg Q8H PRN    epoetin hira injection 4,000 Units Every Tues, Thurs, Sat    fluticasone propionate 50 mcg/actuation nasal spray 50 mcg BID    furosemide tablet 40 mg Daily    heparin (porcine) injection 1,000 Units PRN    heparin 25,000 units in dextrose 5% (100 units/ml) IV bolus from bag - ADDITIONAL PRN BOLUS - 30 units/kg PRN    heparin 25,000 units in dextrose 5% (100 units/ml) IV bolus from bag - ADDITIONAL PRN BOLUS - 60 units/kg PRN    heparin 25,000 units in dextrose 5% 250 mL (100 units/mL) infusion LOW INTENSITY nomogram - [facility] Continuous    levothyroxine tablet 25 mcg Before breakfast    LIDOcaine 5 % patch 1 patch Q24H    magnesium oxide tablet 400 mg Daily    meclizine tablet 25 mg TID PRN    melatonin tablet 6 mg Nightly PRN    methocarbamoL tablet 500 mg TID PRN    naloxone 0.4 mg/mL injection 0.02 mg PRN    ondansetron injection 4 mg Q8H PRN    pantoprazole EC tablet 40 mg BID AC    predniSONE tablet 5 mg Daily    prochlorperazine injection Soln 5 mg Q6H PRN    quetiapine split  tablet 12.5 mg QHS    senna-docusate 8.6-50 mg per tablet 1 tablet BID PRN    simethicone chewable tablet 80 mg QID PRN    sodium chloride 0.9% bolus 250 mL 250 mL PRN    sodium chloride 0.9% bolus 250 mL 250 mL PRN    sodium chloride 0.9% flush 10 mL PRN    sodium chloride 0.9% flush 10 mL Q6H PRN    sucralfate tablet 1 g TID PRN    tiZANidine tablet 4 mg BID PRN    traMADoL tablet 50 mg Q8H PRN    white petrolatum 41 % ointment Daily     Facility-Administered Medications Ordered in Other Encounters   Medication Frequency    celecoxib capsule 400 mg Once    fentaNYL 50 mcg/mL injection  mcg PRN    LIDOcaine (PF) 10 mg/ml (1%) injection 10 mg Once PRN    LIDOcaine (PF) 10 mg/ml (1%) injection 10 mg Once    midazolam (VERSED) 1 mg/mL injection 0.5-4 mg PRN    ropivacaine 0.2% [facility] PainPRO Pump infusion 500 ML Continuous       Objective:     Vital Signs (Most Recent):  Temp: 98.5 °F (36.9 °C) (01/25/23 0444)  Pulse: 70 (01/25/23 0444)  Resp: 18 (01/25/23 0444)  BP: (!) 174/83 (01/25/23 0444)  SpO2: (!) 90 % (01/25/23 0444)   Vital Signs (24h Range):  Temp:  [98 °F (36.7 °C)-98.7 °F (37.1 °C)] 98.5 °F (36.9 °C)  Pulse:  [58-73] 70  Resp:  [16-18] 18  SpO2:  [90 %-98 %] 90 %  BP: (144-174)/(64-83) 174/83     Weight: 63.3 kg (139 lb 8.8 oz) (01/24/23 0340)  Body mass index is 26.37 kg/m².  Body surface area is 1.65 meters squared.    I/O last 3 completed shifts:  In: 322.4 [P.O.:220; I.V.:70; IV Piggyback:32.4]  Out: -     Physical Exam  Vitals and nursing note reviewed.   Constitutional:       General: She is awake. She is not in acute distress.     Appearance: Normal appearance. She is overweight. She is not ill-appearing or diaphoretic.   HENT:      Head: Normocephalic and atraumatic.      Right Ear: External ear normal.      Left Ear: External ear normal.      Nose: Nose normal.      Mouth/Throat:      Mouth: Mucous membranes are moist.      Pharynx: Oropharynx is clear. No oropharyngeal  exudate or posterior oropharyngeal erythema.   Eyes:      General: No scleral icterus.        Right eye: No discharge.         Left eye: No discharge.      Extraocular Movements: Extraocular movements intact.      Conjunctiva/sclera: Conjunctivae normal.   Cardiovascular:      Rate and Rhythm: Normal rate.      Heart sounds: Murmur heard.   Systolic murmur is present with a grade of 1/6.     No friction rub. No gallop.      Comments: Bilateral pitting lower extremity edema which extends proximally.  Pulmonary:      Effort: Pulmonary effort is normal. No respiratory distress.      Breath sounds: No wheezing, rhonchi or rales.   Chest:      Comments: Tunneled HD catheters to right and left chest wall.   Abdominal:      General: Bowel sounds are normal. There is no distension.      Palpations: Abdomen is soft.      Tenderness: There is no abdominal tenderness.   Musculoskeletal:      Cervical back: Neck supple.      Right lower le+ Pitting Edema present.      Left lower le+ Pitting Edema present.   Skin:     General: Skin is warm and dry.      Capillary Refill: Capillary refill takes less than 2 seconds.      Coloration: Skin is not jaundiced.      Findings: No erythema.   Neurological:      General: No focal deficit present.      Mental Status: She is alert. Mental status is at baseline.      Cranial Nerves: No cranial nerve deficit.   Psychiatric:         Mood and Affect: Mood normal.         Behavior: Behavior normal. Behavior is cooperative.       Significant Labs:  BMP:   Recent Labs   Lab 23  0458   GLU 72      K 4.1      CO2 23   BUN 61*   CREATININE 5.6*   CALCIUM 8.9   MG 2.1       CBC:   Recent Labs   Lab 23  0549   WBC 12.25   RBC 2.99*   HGB 8.3*   HCT 26.4*      MCV 88   MCH 27.8   MCHC 31.4*       CMP:   Recent Labs   Lab 23  0509 23  0330 23  0458   GLU 78   < > 72   CALCIUM 8.9   < > 8.9   ALBUMIN 2.4*  --   --       < > 140   K 4.0   < >  4.1   CO2 24   < > 23      < > 103   BUN 73*   < > 61*   CREATININE 5.7*   < > 5.6*    < > = values in this interval not displayed.       Coagulation:   Recent Labs   Lab 01/19/23  2246 01/20/23  0413 01/25/23  0458   INR 1.1  --   --    APTT 33.7*   < > 50.5*  56.9*    < > = values in this interval not displayed.       LFTs:   Recent Labs   Lab 01/21/23  0509   ALBUMIN 2.4*       Microbiology Results (last 7 days)       ** No results found for the last 168 hours. **          Specimen (24h ago, onward)      None          Significant Imaging:  I have reviewed all imagining in the last 24 hours.    Assessment/Plan:     * Bacteremia due to Enterococcus  - management per ID and primary  - repeat blood cultures NGTD    Acute deep vein thrombosis (DVT) of non-extremity vein - subclavian  - management per primary team  - remains on heparin infusion, anticipate transition to DOAC prior to discharge    Urinary tract infection due to extended-spectrum beta lactamase (ESBL) producing Escherichia coli  - Infectious Disease consulted and following  - management per primary team    Anemia due to chronic kidney disease  - goal hemoglobin 10-12,   - most recent iron panel with iron 15, transferrin 138, TIBC 204, 7% saturation and ferritin 378  - transfuse for hemoglobin < 7.0  - defer IV iron in light of recent bacteremia, currently receiving EPO    Primary pauci-immune necrotizing and crescentic glomerulonephritis  Microscopic polyangiitis  Acute renal failure on dialysis  Miscroscopic polyangiitis with renal (biopsy proven pauci immune necrotizing & crescentic glomerulonephritis) and pulmonary involvement s/p Solumedrol 1,000 mg IV x3 doses, PLEX x5 sessions (10/26/2022, 10/27/2022, 10/29/2022, 10/30/2022, 11/01/2022, 11/02/2022, 11/03/2022), Rituximab 375 mg/m^2 x4 (600 mg) on 10/27/2022, 11/03/2922,11/10/2022,11/172022) with cyclophosphamide 7.5 mg/kg on 10/27/2022 and 11/10/2022 (every 14 days). Now iHD for which she  receives HD on one but usually twice weekly for metabolic clearance via tunneled HD catheter roughly x2 a week with last HD Friday (01/06/2023).    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  2)  Necrotizing cresentic glomerulonephritis/[id number] necrotizing cresecentic glomerulonephritis.  3)  Focal acute pyelonephritis.  4)  Mild-to-moderate arterionephrosclerosis    Plan/Recommendations:  - HD today via new left TDC for metabolic clearance, currently functioning well  - anticipate right TDC removal prior to discharge  - can continue Lasix 40 mg PO daily for now  - daily RFPs and magnesium  - currently on prednisone 5 mg daily with atovaquone 1.5 grams faily for PJP prophylaxis   - renal diet if not NPO  - strict I/Os and daily weights  - renally dose all medications to eGFR  - avoid nephrotoxic agents when feasible (i.e. intra-arterial contrast, NSAIDs, supra-therapeutic vancomycin troughs, etc.)    Primary hypertension  - management per primary team    Hypothyroid  - management per primary team    Thank you for your consult. I will follow-up with patient. Please contact us if you have any additional questions.    Pj Paz MD  Nephrology  [facility] ([place])

## 2023-01-25 NOTE — PROCEDURES
Radiology Post-Procedure Note    Pre Op Diagnosis: ESRD  Post Op Diagnosis: Same    Procedure: Left internal jugular vein tunneled dialysis catheter placement    Procedure performed by: Dieudonne Lobato MD    Written Informed Consent Obtained: Yes  Specimen Removed: NO  Estimated Blood Loss: Minimal    Findings:   Widely patent left IJ    Patient tolerated procedure well.    Dieudonne Lobato MD  Interventional Radiology  Department of Radiology

## 2023-01-25 NOTE — PLAN OF CARE
Problem: Adult Inpatient Plan of Care  Goal: Plan of Care Review  Outcome: Ongoing, Progressing  Goal: Patient-Specific Goal (Individualized)  Outcome: Ongoing, Progressing  Goal: Absence of Hospital-Acquired Illness or Injury  Outcome: Ongoing, Progressing  Goal: Optimal Comfort and Wellbeing  Outcome: Ongoing, Progressing  Goal: Readiness for Transition of Care  Outcome: Ongoing, Progressing     Problem: Infection  Goal: Absence of Infection Signs and Symptoms  Outcome: Ongoing, Progressing     Problem: Fluid and Electrolyte Imbalance (Acute Kidney Injury/Impairment)  Goal: Fluid and Electrolyte Balance  Outcome: Ongoing, Progressing     Problem: Renal Function Impairment (Acute Kidney Injury/Impairment)  Goal: Effective Renal Function  Outcome: Ongoing, Progressing

## 2023-01-25 NOTE — PHYSICIAN QUERY
PT Name: Kristin Goodman  MR #: [id number]     DOCUMENTATION CLARIFICATION     CDS/: Brandy Boucher RN          Contact Information: [email or web address]    This form is a permanent document in the medical record.     Query Date: January 25, 2023    By submitting this query, we are merely seeking further clarification of documentation.  Please utilize your independent clinical judgment when addressing the question(s) below.  The Medical Record contains the following:  Indicators Supporting Clinical Findings Location in Medical Record   x HR         RR          BP        Temp Vital Signs (24h Range):  Temp:  [97.7 °F (36.5 °C)-98.7 °F (37.1 °C)] 97.7 °F (36.5 °C)  Pulse:  [61-80] 71  Resp:  [16-18] 16  SpO2:  [99 %-100 %] 99 %  BP: (120-150)/(61-77) 120/61 1/10/2023 HM PN     Lactic Acid          Procalcitonin     x WBC           Bands          CRP     01/08/23  0600 01/09/23  0346 01/09/23 2031   WBC 16.94* 13.98* 17.47*    Lab results   x Culture(s) blood cultures from 1/5 and 1/7 that have grown Enterococcus (amp sensitive) vancomycin started today 1/9/2023 H&P    AMS, Confusion, LOC, etc.     x Organ Dysfunction/Failure Acute renal failure on dialysis 1/9/2023 H&P   x Bacteremia or Sepsis / Septic Bacteremia due to Enterococcus 1/9/2023 H&P   x Known or Suspected Source of Infection documented patient had sluggish HD catheter with dialysis  Urinary tract infection due to extended-spectrum beta lactamase (ESBL) producing Escherichia coli    Bacteremia due to Enterococcus  Suspect source prior HD line vs transient gut translocation in setting of abdominal pain, fever, and diarrhea    CT abdo/pelvis negative for source 1/9/2023 H&P        1/13/2023 ID PN         1/14/2023 ID PN    (Failed) Outpatient Treatment     x Medication Received 1 gm IV vancomycin at LTAC on 1/10    continue vancomycin  1/10/2023 HM PN     1/14/2023 ID PN   x Treatment Plan for line holiday and IR consulted to remove tunneled  catheter removal today    HD line removal completed 1/10/2023 HM PN       1/10/2023 IR Note    Other          Provider, please clarify if there is any clinical correlation between the Bacteremia due to Enterococcus and HD catheter:    [  x ] Due to or associated with each other   [   ] Unrelated to each other   [   ] Other explanation (please specify): __________   [  ] Clinically Undetermined         Please document in your progress notes daily for the duration of treatment until resolved and include in your discharge summary.

## 2023-01-25 NOTE — ASSESSMENT & PLAN NOTE
-due to Microscopic Polyangiitis  - IR consulted. S/p permcath placement  on 1/17/23   - US of UE showed DVTs in R and L UE.   - CXR:The dialysis catheter remains in position  - S/p R TDC replacement on 01/23 by IR.  On 01/24, TDC was not working again for hemodialysis.  Consulted IR again.  S/p Left TDC placement on 1/25.   - Nephrology following. Patient will need HD chair set up prior to d/c.

## 2023-01-25 NOTE — ASSESSMENT & PLAN NOTE
- management per primary team  - remains on heparin infusion, anticipate transition to DOAC prior to discharge

## 2023-01-26 LAB
ANION GAP SERPL CALC-SCNC: 14 MMOL/L (ref 8–16)
APTT BLDCRRT: 51 SEC (ref 21–32)
BASOPHILS # BLD AUTO: 0.05 K/UL (ref 0–0.2)
BASOPHILS NFR BLD: 0.4 % (ref 0–1.9)
BUN SERPL-MCNC: 33 MG/DL (ref 8–23)
CALCIUM SERPL-MCNC: 8.8 MG/DL (ref 8.7–10.5)
CHLORIDE SERPL-SCNC: 105 MMOL/L (ref 95–110)
CO2 SERPL-SCNC: 24 MMOL/L (ref 23–29)
CREAT SERPL-MCNC: 3.2 MG/DL (ref 0.5–1.4)
DIFFERENTIAL METHOD: ABNORMAL
EOSINOPHIL # BLD AUTO: 0.1 K/UL (ref 0–0.5)
EOSINOPHIL NFR BLD: 0.8 % (ref 0–8)
ERYTHROCYTE [DISTWIDTH] IN BLOOD BY AUTOMATED COUNT: 15.9 % (ref 11.5–14.5)
EST. GFR  (NO RACE VARIABLE): 14.5 ML/MIN/1.73 M^2
GLUCOSE SERPL-MCNC: 76 MG/DL (ref 70–110)
HBV CORE AB SERPL QL IA: NORMAL
HBV SURFACE AB SER-ACNC: 77.23 MIU/ML
HBV SURFACE AB SER-ACNC: REACTIVE M[IU]/ML
HBV SURFACE AG SERPL QL IA: NORMAL
HCT VFR BLD AUTO: 31.9 % (ref 37–48.5)
HGB BLD-MCNC: 9.8 G/DL (ref 12–16)
IMM GRANULOCYTES # BLD AUTO: 0.43 K/UL (ref 0–0.04)
IMM GRANULOCYTES NFR BLD AUTO: 3.3 % (ref 0–0.5)
LYMPHOCYTES # BLD AUTO: 3.2 K/UL (ref 1–4.8)
LYMPHOCYTES NFR BLD: 25 % (ref 18–48)
MAGNESIUM SERPL-MCNC: 2 MG/DL (ref 1.6–2.6)
MCH RBC QN AUTO: 28.4 PG (ref 27–31)
MCHC RBC AUTO-ENTMCNC: 30.7 G/DL (ref 32–36)
MCV RBC AUTO: 93 FL (ref 82–98)
MONOCYTES # BLD AUTO: 1.7 K/UL (ref 0.3–1)
MONOCYTES NFR BLD: 12.9 % (ref 4–15)
NEUTROPHILS # BLD AUTO: 7.4 K/UL (ref 1.8–7.7)
NEUTROPHILS NFR BLD: 57.6 % (ref 38–73)
NRBC BLD-RTO: 0 /100 WBC
PHOSPHATE SERPL-MCNC: 2.8 MG/DL (ref 2.7–4.5)
PLATELET # BLD AUTO: 232 K/UL (ref 150–450)
PLATELET BLD QL SMEAR: ABNORMAL
PMV BLD AUTO: 10.4 FL (ref 9.2–12.9)
POTASSIUM SERPL-SCNC: 3.9 MMOL/L (ref 3.5–5.1)
RBC # BLD AUTO: 3.45 M/UL (ref 4–5.4)
SODIUM SERPL-SCNC: 143 MMOL/L (ref 136–145)
WBC # BLD AUTO: 12.9 K/UL (ref 3.9–12.7)

## 2023-01-26 PROCEDURE — 87340 HEPATITIS B SURFACE AG IA: CPT | Performed by: STUDENT IN AN ORGANIZED HEALTH CARE EDUCATION/TRAINING PROGRAM

## 2023-01-26 PROCEDURE — 63600175 PHARM REV CODE 636 W HCPCS: Mod: JG | Performed by: INTERNAL MEDICINE

## 2023-01-26 PROCEDURE — 99232 PR SUBSEQUENT HOSPITAL CARE,LEVL II: ICD-10-PCS | Mod: ,,, | Performed by: HOSPITALIST

## 2023-01-26 PROCEDURE — 25000003 PHARM REV CODE 250: Performed by: HOSPITALIST

## 2023-01-26 PROCEDURE — 25000003 PHARM REV CODE 250: Performed by: INTERNAL MEDICINE

## 2023-01-26 PROCEDURE — 84100 ASSAY OF PHOSPHORUS: CPT | Performed by: STUDENT IN AN ORGANIZED HEALTH CARE EDUCATION/TRAINING PROGRAM

## 2023-01-26 PROCEDURE — 86704 HEP B CORE ANTIBODY TOTAL: CPT | Performed by: STUDENT IN AN ORGANIZED HEALTH CARE EDUCATION/TRAINING PROGRAM

## 2023-01-26 PROCEDURE — 25000003 PHARM REV CODE 250: Performed by: STUDENT IN AN ORGANIZED HEALTH CARE EDUCATION/TRAINING PROGRAM

## 2023-01-26 PROCEDURE — 80048 BASIC METABOLIC PNL TOTAL CA: CPT | Performed by: STUDENT IN AN ORGANIZED HEALTH CARE EDUCATION/TRAINING PROGRAM

## 2023-01-26 PROCEDURE — 99232 SBSQ HOSP IP/OBS MODERATE 35: CPT | Mod: ,,, | Performed by: HOSPITALIST

## 2023-01-26 PROCEDURE — 83735 ASSAY OF MAGNESIUM: CPT | Performed by: STUDENT IN AN ORGANIZED HEALTH CARE EDUCATION/TRAINING PROGRAM

## 2023-01-26 PROCEDURE — 63600175 PHARM REV CODE 636 W HCPCS: Performed by: HOSPITALIST

## 2023-01-26 PROCEDURE — 12000002 HC ACUTE/MED SURGE SEMI-PRIVATE ROOM

## 2023-01-26 PROCEDURE — 36415 COLL VENOUS BLD VENIPUNCTURE: CPT | Performed by: INTERNAL MEDICINE

## 2023-01-26 PROCEDURE — 85730 THROMBOPLASTIN TIME PARTIAL: CPT | Performed by: STUDENT IN AN ORGANIZED HEALTH CARE EDUCATION/TRAINING PROGRAM

## 2023-01-26 PROCEDURE — 86706 HEP B SURFACE ANTIBODY: CPT | Mod: 91 | Performed by: STUDENT IN AN ORGANIZED HEALTH CARE EDUCATION/TRAINING PROGRAM

## 2023-01-26 PROCEDURE — 36415 COLL VENOUS BLD VENIPUNCTURE: CPT | Performed by: STUDENT IN AN ORGANIZED HEALTH CARE EDUCATION/TRAINING PROGRAM

## 2023-01-26 PROCEDURE — 85025 COMPLETE CBC W/AUTO DIFF WBC: CPT | Performed by: INTERNAL MEDICINE

## 2023-01-26 RX ORDER — AMLODIPINE BESYLATE 10 MG/1
10 TABLET ORAL DAILY
Status: DISCONTINUED | OUTPATIENT
Start: 2023-01-26 | End: 2023-02-03 | Stop reason: HOSPADM

## 2023-01-26 RX ORDER — METOPROLOL SUCCINATE 50 MG/1
50 TABLET, EXTENDED RELEASE ORAL DAILY
Status: DISCONTINUED | OUTPATIENT
Start: 2023-01-26 | End: 2023-01-26

## 2023-01-26 RX ORDER — CARVEDILOL 6.25 MG/1
6.25 TABLET ORAL 2 TIMES DAILY
Status: DISCONTINUED | OUTPATIENT
Start: 2023-01-26 | End: 2023-01-27

## 2023-01-26 RX ADMIN — CLONIDINE HYDROCHLORIDE 0.1 MG: 0.1 TABLET ORAL at 08:01

## 2023-01-26 RX ADMIN — FLUTICASONE PROPIONATE 50 MCG: 50 SPRAY, METERED NASAL at 08:01

## 2023-01-26 RX ADMIN — WHITE PETROLATUM: 1.75 OINTMENT TOPICAL at 09:01

## 2023-01-26 RX ADMIN — CLONIDINE HYDROCHLORIDE 0.1 MG: 0.1 TABLET ORAL at 12:01

## 2023-01-26 RX ADMIN — Medication 1 TABLET: at 09:01

## 2023-01-26 RX ADMIN — LIDOCAINE 1 PATCH: 50 PATCH CUTANEOUS at 05:01

## 2023-01-26 RX ADMIN — APIXABAN 10 MG: 5 TABLET, FILM COATED ORAL at 08:01

## 2023-01-26 RX ADMIN — CARVEDILOL 6.25 MG: 6.25 TABLET, FILM COATED ORAL at 09:01

## 2023-01-26 RX ADMIN — TIZANIDINE 4 MG: 4 TABLET ORAL at 05:01

## 2023-01-26 RX ADMIN — MAGNESIUM OXIDE TAB 400 MG (241.3 MG ELEMENTAL MG) 400 MG: 400 (241.3 MG) TAB at 09:01

## 2023-01-26 RX ADMIN — FUROSEMIDE 40 MG: 40 TABLET ORAL at 09:01

## 2023-01-26 RX ADMIN — FLUTICASONE PROPIONATE 50 MCG: 50 SPRAY, METERED NASAL at 09:01

## 2023-01-26 RX ADMIN — QUETIAPINE FUMARATE 12.5 MG: 25 TABLET ORAL at 08:01

## 2023-01-26 RX ADMIN — ATOVAQUONE 1500 MG: 750 SUSPENSION ORAL at 09:01

## 2023-01-26 RX ADMIN — PREDNISONE 5 MG: 5 TABLET ORAL at 09:01

## 2023-01-26 RX ADMIN — CARVEDILOL 6.25 MG: 6.25 TABLET, FILM COATED ORAL at 08:01

## 2023-01-26 RX ADMIN — PANTOPRAZOLE SODIUM 40 MG: 40 TABLET, DELAYED RELEASE ORAL at 04:01

## 2023-01-26 RX ADMIN — LEVOTHYROXINE SODIUM 25 MCG: 25 TABLET ORAL at 05:01

## 2023-01-26 RX ADMIN — ERYTHROPOIETIN 4000 UNITS: 4000 INJECTION, SOLUTION INTRAVENOUS; SUBCUTANEOUS at 10:01

## 2023-01-26 RX ADMIN — PANTOPRAZOLE SODIUM 40 MG: 40 TABLET, DELAYED RELEASE ORAL at 05:01

## 2023-01-26 RX ADMIN — AMLODIPINE BESYLATE 10 MG: 10 TABLET ORAL at 09:01

## 2023-01-26 NOTE — SUBJECTIVE & OBJECTIVE
Interval History: Patient seen and examined this AM. Daughter at bedside. HD stopped 20 minutes early yesterday secondary to clotting however lower flows used given lack of HD over past two weeks. Frustration regarding right TDC not being removed yesterday with placement of left TDC and not amendable to consenting today for right TDC removal tomorrow with IR. Expressed understanding but also informed delay of discharge as patient cannot be discharged with non-functional right TDC, risk of infection, etc. to which verbalized understanding. Afebrile overnight with pulse ranging from 80-50s bpm. Systolic blood pressures ranging from 190-140s mmHg. She is saturating +95% on room air with documented 200 mL of UOP in the last 24 hours. No indications for HD today.    Review of patient's allergies indicates:   Allergen Reactions    Ampicillin     Peaches [peach (prunus persica)] Other (See Comments)     Pt unable to state type of reaction. Information obtained from daughter who states she was informed of allergy from patient.    Penicillins      Other reaction(s): Hives, anaphylaxis    Sulfa (sulfonamide antibiotics) Rash and Hives     Current Facility-Administered Medications   Medication Frequency    0.9%  NaCl infusion (for blood administration) Q24H PRN    acetaminophen tablet 650 mg Q4H PRN    albuterol-ipratropium 2.5 mg-0.5 mg/3 mL nebulizer solution 3 mL Q4H PRN    aluminum-magnesium hydroxide 200-200 mg/5 mL suspension 30 mL QID PRN    atovaquone 750 mg/5 mL oral liquid 1,500 mg Daily    B-complex with vitamin C tablet 1 tablet Daily    bisacodyL suppository 10 mg Daily PRN    cloNIDine tablet 0.1 mg Q8H PRN    epoetin hira injection 4,000 Units Every Tues, Thurs, Sat    fluticasone propionate 50 mcg/actuation nasal spray 50 mcg BID    furosemide tablet 40 mg Daily    heparin (porcine) injection 1,000 Units PRN    heparin (porcine) injection 1,000 Units PRN    heparin 25,000 units in dextrose 5% (100 units/ml) IV  bolus from bag - ADDITIONAL PRN BOLUS - 30 units/kg PRN    heparin 25,000 units in dextrose 5% (100 units/ml) IV bolus from bag - ADDITIONAL PRN BOLUS - 60 units/kg PRN    heparin 25,000 units in dextrose 5% 250 mL (100 units/mL) infusion LOW INTENSITY nomogram - [facility] Continuous    levothyroxine tablet 25 mcg Before breakfast    LIDOcaine 5 % patch 1 patch Q24H    magnesium oxide tablet 400 mg Daily    meclizine tablet 25 mg TID PRN    melatonin tablet 6 mg Nightly PRN    methocarbamoL tablet 500 mg TID PRN    naloxone 0.4 mg/mL injection 0.02 mg PRN    ondansetron injection 4 mg Q8H PRN    pantoprazole EC tablet 40 mg BID AC    predniSONE tablet 5 mg Daily    prochlorperazine injection Soln 5 mg Q6H PRN    quetiapine split tablet 12.5 mg QHS    senna-docusate 8.6-50 mg per tablet 1 tablet BID PRN    simethicone chewable tablet 80 mg QID PRN    sodium chloride 0.9% bolus 250 mL 250 mL PRN    sodium chloride 0.9% bolus 250 mL 250 mL PRN    sodium chloride 0.9% bolus 250 mL 250 mL PRN    sodium chloride 0.9% flush 10 mL PRN    sodium chloride 0.9% flush 10 mL Q6H PRN    sucralfate tablet 1 g TID PRN    tiZANidine tablet 4 mg BID PRN    traMADoL tablet 50 mg Q8H PRN    white petrolatum 41 % ointment Daily     Facility-Administered Medications Ordered in Other Encounters   Medication Frequency    celecoxib capsule 400 mg Once    fentaNYL 50 mcg/mL injection  mcg PRN    LIDOcaine (PF) 10 mg/ml (1%) injection 10 mg Once PRN    LIDOcaine (PF) 10 mg/ml (1%) injection 10 mg Once    midazolam (VERSED) 1 mg/mL injection 0.5-4 mg PRN    ropivacaine 0.2% [facility] PainPRO Pump infusion 500 ML Continuous       Objective:     Vital Signs (Most Recent):  Temp: 98.2 °F (36.8 °C) (01/26/23 0542)  Pulse: 74 (01/26/23 0542)  Resp: 20 (01/26/23 0542)  BP: (!) 171/82 (01/26/23 0542)  SpO2: 95 % (01/26/23 0542)   Vital Signs (24h Range):  Temp:  [98 °F (36.7 °C)-98.4 °F (36.9 °C)] 98.2 °F (36.8 °C)  Pulse:  [59-80] 74  Resp:  [12-20]  20  SpO2:  [95 %-100 %] 95 %  BP: (143-195)/(65-97) 171/82     Weight: 63.3 kg (139 lb 8.8 oz) (23 0340)  Body mass index is 26.37 kg/m².  Body surface area is 1.65 meters squared.    I/O last 3 completed shifts:  In: 560 [P.O.:360; Other:200]  Out: 682 [Urine:200; Other:482]    Physical Exam  Vitals and nursing note reviewed.   Constitutional:       General: She is awake. She is not in acute distress.     Appearance: Normal appearance. She is overweight. She is not ill-appearing or diaphoretic.   HENT:      Head: Normocephalic and atraumatic.      Right Ear: External ear normal.      Left Ear: External ear normal.      Nose: Nose normal.      Mouth/Throat:      Mouth: Mucous membranes are moist.      Pharynx: Oropharynx is clear. No oropharyngeal exudate or posterior oropharyngeal erythema.   Eyes:      General: No scleral icterus.        Right eye: No discharge.         Left eye: No discharge.      Extraocular Movements: Extraocular movements intact.      Conjunctiva/sclera: Conjunctivae normal.   Cardiovascular:      Rate and Rhythm: Normal rate.      Heart sounds: Murmur heard.   Systolic murmur is present with a grade of 1/6.     No friction rub. No gallop.      Comments: Bilateral pitting lower extremity edema which extends proximally.  Pulmonary:      Effort: Pulmonary effort is normal. No respiratory distress.      Breath sounds: No wheezing, rhonchi or rales.   Chest:      Comments: Tunneled HD catheters to right and left chest wall.   Abdominal:      General: Bowel sounds are normal. There is no distension.      Palpations: Abdomen is soft.      Tenderness: There is no abdominal tenderness.   Musculoskeletal:      Cervical back: Neck supple.      Right lower le+ Pitting Edema present.      Left lower le+ Pitting Edema present.   Skin:     General: Skin is warm and dry.      Capillary Refill: Capillary refill takes less than 2 seconds.      Coloration: Skin is not jaundiced.      Findings: No  erythema.   Neurological:      General: No focal deficit present.      Mental Status: She is alert. Mental status is at baseline.      Cranial Nerves: No cranial nerve deficit.   Psychiatric:         Mood and Affect: Mood normal.         Behavior: Behavior normal. Behavior is cooperative.       Significant Labs:  BMP:   Recent Labs   Lab 01/26/23  0359   GLU 76      K 3.9      CO2 24   BUN 33*   CREATININE 3.2*   CALCIUM 8.8   MG 2.0       CBC:   Recent Labs   Lab 01/24/23  0549   WBC 12.25   RBC 2.99*   HGB 8.3*   HCT 26.4*      MCV 88   MCH 27.8   MCHC 31.4*       CMP:   Recent Labs   Lab 01/21/23  0509 01/22/23  0330 01/26/23  0359   GLU 78   < > 76   CALCIUM 8.9   < > 8.8   ALBUMIN 2.4*  --   --       < > 143   K 4.0   < > 3.9   CO2 24   < > 24      < > 105   BUN 73*   < > 33*   CREATININE 5.7*   < > 3.2*    < > = values in this interval not displayed.       Coagulation:   Recent Labs   Lab 01/19/23  2246 01/20/23  0413 01/26/23  0359   INR 1.1  --   --    APTT 33.7*   < > 51.0*    < > = values in this interval not displayed.       LFTs:   Recent Labs   Lab 01/21/23  0509   ALBUMIN 2.4*       Microbiology Results (last 7 days)       ** No results found for the last 168 hours. **          Specimen (24h ago, onward)      None          Significant Imaging:  I have reviewed all imagining in the last 24 hours.

## 2023-01-26 NOTE — PLAN OF CARE
Calling [facility] EARLENE Seymour on [facility] [phone].  Asked for chair time information for this pt to be faxed to [phone]. She states they don't have her; she doesn't see any other [facility] clinics she is in.  Asked if they received auth for this patient.  CM can send referral to see if they can accept her.  Please fax over admissions form to above fax number.  Put that she is requesting clinic 1371.  Faxed referral.    12:55 PM CM calling Yu at [facility] [phone] to see if referral received.  Yu at another clinic this afternoon [phone] - calling them - Left .    ALIZA ToussaintN, BS, RN, CCM

## 2023-01-26 NOTE — PROGRESS NOTES
01/25/23 1811   Post-Hemodialysis Assessment   Rinseback Volume (mL) 250 mL   Blood Volume Processed (Liters) 40.6 L   Dialyzer Clearance Heavily streaked   Duration of Treatment 170 minutes   Additional Fluid Intake (mL) 200 mL   Total UF (mL) 482 mL   Net Fluid Removal 0   Patient Response to Treatment tolerated   Post-Hemodialysis Comments see note     HD TX ended with 20 mins remaining in treatment due to clotting in venous chamber. Rinse back completed. Net removal 0 ml. Pt remain slightly drowsy. Blood glucose WNL. Heparin drip continues at 7 ml/hr. Report given to primary nurse. Awaiting transport to escort pt to room via stretcher.

## 2023-01-26 NOTE — ASSESSMENT & PLAN NOTE
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 Now iHD for which she receives HD on one but usually twice weekly for metabolic clearance via tunneled HD catheter roughly x2 a week with last HD Friday (01/06/2023).    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  2)  Necrotizing cresentic glomerulonephritis/[id number] necrotizing cresecentic glomerulonephritis.  3)  Focal acute pyelonephritis.  4)  Mild-to-moderate arterionephrosclerosis    Plan/Recommendations:  - no acute indications for HD today  - anticipate right TDC removal prior to discharge, IR consulted however patient and daughter to amendable to removal at this time  - can continue Lasix 40 mg PO daily for now  - daily RFPs and magnesium  - currently on prednisone 5 mg daily with atovaquone 1.5 grams faily for PJP prophylaxis   - renal diet if not NPO  - strict I/Os and daily weights  - renally dose all medications to eGFR  - avoid nephrotoxic agents when feasible (i.e. intra-arterial contrast, NSAIDs, supra-therapeutic vancomycin troughs, etc.)

## 2023-01-26 NOTE — PROGRESS NOTES
[facility] ([address] Medicine  Progress Note    Patient Name: Kristin Goodman  MRN: [id number]  Patient Class: IP- Inpatient   Admission Date: 1/9/2023  Length of Stay: 17 days  Attending Physician: Paul Clayton MD  Primary Care Provider: Lori Hernandez MD        Subjective:     Principal Problem:Bacteremia due to Enterococcus        HPI:  77-year-old  woman with HTN, hypothyroidism, HFpEF, and osteoarthritis and new acute renal failure due to new diagnosis of Microscopic Polyangiitis s/p renal biopsy and requiring hemodialysis is transferred from [facility] for concerns of new bacteremia.     She was hospitalized from 10/17/22-12/13/22 then transferred to [facility].  Microscopic polyangiitis with pulmonary and renal involvement had suffered respiratory failure with diffuse alveolar hemorrhage, gi bleeding, and renal replacement therapy. S/p Rheumatology consultation and pulse IV steroids + plasmapheresis with Transfusion medicine started on Rituximab and Cyclophosphamide.  Continues on Steroid taper for immunosuppression.     More recently earlier this week noted to have a urine culture grow ESBL E.coli Cystitis, started on meropenem, then she had blood cultures from 1/5 and 1/7 that have grown Enterococcus (amp sensitive) vancomycin started today, patient had sluggish HD catheter with dialysis.   Also ED report that patient may have had syncope during HD today.     She is transferred for continued infectious workup and management as well as removal of tunneled HD line for line holiday.       Overview/Hospital Course:  Seen by ID and nephrology. Plan for line holiday and IR consulted.      Interval History: S/p placement of L TDC by IR 1/25. Pt frustrated regarding right TDC not being removed during placement of left TDC. Pt currently refusing removal of R TDC today, stating she wants to wait until tomorrow.      Review of Systems   Constitutional:  Positive  for activity change. Negative for fatigue and fever.   HENT:  Negative for sore throat and trouble swallowing.    Eyes:  Negative for visual disturbance.   Respiratory:  Negative for cough and shortness of breath.    Cardiovascular:  Negative for chest pain and leg swelling.   Gastrointestinal:  Negative for abdominal distention and abdominal pain.   Genitourinary:  Negative for difficulty urinating.   Musculoskeletal:  Negative for back pain and gait problem.   Skin:  Negative for color change.   Neurological:  Positive for weakness. Negative for dizziness, light-headedness and headaches.   Psychiatric/Behavioral:  Negative for agitation and confusion.    Objective:     Vital Signs (Most Recent):  Temp: 97.6 °F (36.4 °C) (01/26/23 1055)  Pulse: 76 (01/26/23 1637)  Resp: 18 (01/26/23 1055)  BP: (!) 163/76 (01/26/23 1055)  SpO2: (!) 94 % (01/26/23 1055)   Vital Signs (24h Range):  Temp:  [97.6 °F (36.4 °C)-98.4 °F (36.9 °C)] 97.6 °F (36.4 °C)  Pulse:  [59-80] 76  Resp:  [17-20] 18  SpO2:  [94 %-95 %] 94 %  BP: (143-195)/(74-97) 163/76     Weight: 63.3 kg (139 lb 8.8 oz)  Body mass index is 26.37 kg/m².    Intake/Output Summary (Last 24 hours) at 1/26/2023 1703  Last data filed at 1/26/2023 1454  Gross per 24 hour   Intake 1361.21 ml   Output 982 ml   Net 379.21 ml        Physical Exam  Vitals reviewed.   Constitutional:       General: She is not in acute distress.     Appearance: Normal appearance. She is not toxic-appearing.   HENT:      Head: Normocephalic and atraumatic.   Eyes:      General: No scleral icterus.  Cardiovascular:      Rate and Rhythm: Normal rate and regular rhythm.      Pulses: Normal pulses.      Heart sounds: No murmur heard.  Pulmonary:      Effort: Pulmonary effort is normal. No respiratory distress.      Breath sounds: No wheezing, rhonchi or rales.   Chest:      Comments: L TDC  Abdominal:      General: Bowel sounds are normal. There is no distension.      Tenderness: There is no abdominal  tenderness. There is no guarding.   Musculoskeletal:      Right lower leg: No edema.      Left lower leg: No edema.   Skin:     General: Skin is warm.   Neurological:      General: No focal deficit present.      Mental Status: She is alert and oriented to person, place, and time. Mental status is at baseline.   Psychiatric:         Behavior: Behavior normal.         Thought Content: Thought content normal.             Assessment/Plan:      * Bacteremia due to Enterococcus  Positive blood cxs 1/5, 1/7, 1/9. Received 1 gm IV vancomycin at LTAC on 1/10.   HD tunelled cath removed 1/10.  Also has midline from LTAC - removed. Surface echo - no vegetation. ID consulted. Last surveillance bcxs 1/11/23 NGTD  Per ID - 2-weeks vanc from negative bcxs/line removal - end date 1/24. Completed.    WILFREDO (acute kidney injury)        Acute deep vein thrombosis (DVT) of non-extremity vein - subclavian  Heparin drip started 1/19 pending procedures to re-establish HD access.  Ultrasound bilateral upper extremity t:    Pauci-immune vasculitis  Continue steroid taper    Urinary tract infection due to extended-spectrum beta lactamase (ESBL) producing Escherichia coli  Urine culture from 1/05 with ESBL E.coli   -Ertapenem renal dosing 500mg IV q24 ordered, completed 5 days (1/5-1/10). Missed dose on 1/9 due to transfer to hospital    Anemia due to chronic kidney disease  Has required transfusions during recent inpatient/LTAC stays   -was receiving EPO infusion at nephrology direction.   Hb 6.7 on 01/16.  Ordered a unit of blood.  Consent obtained.  Continue to monitor CBC.  Goal for transfusion hemoglobin less than 7.    Primary pauci-immune necrotizing and crescentic glomerulonephritis  Patient with acute kidney injury likely due to microscopic polyangiits with granulomatosis (MPA) WILFREDO is currently stable. Labs reviewed- Renal function/electrolytes with Estimated Creatinine Clearance: 12.9 mL/min (A) (based on SCr of 3.2 mg/dL (H)).  according to latest data. Monitor urine output and serial BMP and adjust therapy as needed. Avoid nephrotoxins and renally dose meds for GFR listed above.  Had complex course with DAH, requiring pulse dose steroids, plasmapheresis and then rituximab and cyclophosphamide  -continue prednisone taper  -continue atovaquone for PJP ppx  -will need outpatient Rheum follow up    Gastric reflux  Continue BID PPI    Dysphagia  Continue renal diet   SLP following     Acute renal failure on dialysis  -due to Microscopic Polyangiitis  - IR consulted. S/p permcath placement  on 1/17/23   - US of UE showed DVTs in R and L UE.   - CXR:The dialysis catheter remains in position  - S/p R TDC replacement on 01/23 by IR.  On 01/24, TDC was not working again for hemodialysis.  Consulted IR again.  S/p Left TDC placement on 1/25.   - Nephrology following. Patient will need HD chair set up prior to d/c.  -Removal of R TDC tomorrow    Microscopic polyangiitis  -continue steroid taper   -patient is on OI prophylaxis with atovaquone 1500mg po daily  -continued on Protonix 40mg po bid while on glucocorticoids.     Impaired mobility  OT/PT - plan for SNF    Chronic diastolic heart failure  Has preserved EF   -HD was primary volume maintenance   -continue Lasix 40mg po daily - consider higher or more frequent doses are required during her LIne holiday     Syncope  Report of possible syncope with HD,  Dizzy spells noted per LTAC notes - pt s/p MRI brain on 12/8 w/o acute findings   -neurology prior eval has recommended PT/OT for vestibular therapy  -Future outpatient cardiology visit for possible tilt-table eval.   -refer to neurology at discharge for BPPV follow up    Primary hypertension  Continue carvedilol  Add amlodipine  -home lisinopril is on hold due to her WILFREDO    Hypothyroid  Continue levothyroxine 25mcg       VTE Risk Mitigation (From admission, onward)         Ordered     apixaban tablet 10 mg  2 times daily         01/26/23 0802      heparin (porcine) injection 1,000 Units  As needed (PRN)         01/25/23 1128     heparin 25,000 units in dextrose 5% (100 units/ml) IV bolus from bag - ADDITIONAL PRN BOLUS - 30 units/kg  As needed (PRN)        Question:  Heparin Infusion Adjustment (DO NOT MODIFY ANSWER)  Answer:  [email or web address]\Images\Pharmacy\HeparinInfusions\heparin LOW INTENSITY nomogram for [facility] [id number]    01/24/23 0914     heparin 25,000 units in dextrose 5% (100 units/ml) IV bolus from bag - ADDITIONAL PRN BOLUS - 60 units/kg  As needed (PRN)        Question:  Heparin Infusion Adjustment (DO NOT MODIFY ANSWER)  Answer:  [email or web address]\Images\Pharmacy\HeparinInfusions\heparin LOW INTENSITY nomogram for [facility] [id number]    01/24/23 0914     heparin (porcine) injection 1,000 Units  As needed (PRN)         01/23/23 1321     heparin (porcine) injection 1,000 Units  As needed (PRN)         01/17/23 1421     heparin (porcine) injection 1,000 Units  As needed (PRN)         01/09/23 2330     Place sequential compression device  Until discontinued         01/09/23 2330                Discharge Planning   ANTHONY: 1/26/2023     Code Status: Full Code   Is the patient medically ready for discharge?: No    Reason for patient still in hospital (select all that apply): Patient trending condition and Treatment  Discharge Plan A: Home Health   Discharge Delays: None known at this time              Paul Clayton MD  Department of Hospital Medicine   [facility] - Intensive Care ([place])

## 2023-01-26 NOTE — ASSESSMENT & PLAN NOTE
Patient with acute kidney injury likely due to microscopic polyangiits with granulomatosis (MPA) WILFREDO is currently stable. Labs reviewed- Renal function/electrolytes with Estimated Creatinine Clearance: 12.9 mL/min (A) (based on SCr of 3.2 mg/dL (H)). according to latest data. Monitor urine output and serial BMP and adjust therapy as needed. Avoid nephrotoxins and renally dose meds for GFR listed above.  Had complex course with DAH, requiring pulse dose steroids, plasmapheresis and then rituximab and cyclophosphamide  -continue prednisone taper  -continue atovaquone for PJP ppx  -will need outpatient Rheum follow up

## 2023-01-26 NOTE — PROGRESS NOTES
[facility] ([place] [address])  Nephrology  Progress Note    Patient Name: Kristin Goodman  MRN: [id number]  Admission Date: 1/9/2023  Hospital Length of Stay: 17 days  Attending Provider: Paul Clayton MD   Primary Care Physician: Lori Hernandez MD  Principal Problem:Bacteremia due to Enterococcus    Subjective:     HPI: Ms Goodman is a 77-year-old woman with hypertension, hypothyroidism, HFpEF (most recent TTE with EF 65% with G1DD), pulmonary hypertension, current ESBL E. coli UTI, osteoarthritis, chronic back pain, DONAVAN and microscopic polyangiitis complicated base on biopsy from 10/26 by renal failure, GIB and respiratory failure with history of diffuse alveolar hemorrhage s/p IV Solumedrol, PLEX x5 sessions, Rituximab x4 doses and cyclophosphamide however now  just on steroid taper (cyclophosphamide appears to be hold secondary to anemia) now iHD dependent twice weekly via tunneled HD catheter for metabolic clearance (follows with Dr. Mojica with [facility]) who presented from [facility] for TDC removal in the setting of positive blood cultures growing Enterococcus faecalis and Bacillus species from cultures obtained on 1/5 & 1/7. In addition patient with reported syncopal event and sluggish flows on HD on 1/9 (incomplete session, daughter attributes to iron infusion) with last full session of iHD being on 1/6. She reports still making good urine without any urinary complaints today. Nephrology has been consulted for inpatient HD needs.      Interval History: Patient seen and examined this AM. Daughter at bedside. HD stopped 20 minutes early yesterday secondary to clotting however lower flows used given lack of HD over past two weeks. Frustration regarding right TDC not being removed yesterday with placement of left TDC and not amendable to consenting today for right TDC removal tomorrow with IR. Expressed understanding but also informed delay of discharge as patient cannot be  discharged with non-functional right TDC, risk of infection, etc. to which verbalized understanding. Afebrile overnight with pulse ranging from 80-50s bpm. Systolic blood pressures ranging from 190-140s mmHg. She is saturating +95% on room air with documented 200 mL of UOP in the last 24 hours. No indications for HD today.    Review of patient's allergies indicates:   Allergen Reactions    Ampicillin     Peaches [peach (prunus persica)] Other (See Comments)     Pt unable to state type of reaction. Information obtained from daughter who states she was informed of allergy from patient.    Penicillins      Other reaction(s): Hives, anaphylaxis    Sulfa (sulfonamide antibiotics) Rash and Hives     Current Facility-Administered Medications   Medication Frequency    0.9%  NaCl infusion (for blood administration) Q24H PRN    acetaminophen tablet 650 mg Q4H PRN    albuterol-ipratropium 2.5 mg-0.5 mg/3 mL nebulizer solution 3 mL Q4H PRN    aluminum-magnesium hydroxide 200-200 mg/5 mL suspension 30 mL QID PRN    atovaquone 750 mg/5 mL oral liquid 1,500 mg Daily    B-complex with vitamin C tablet 1 tablet Daily    bisacodyL suppository 10 mg Daily PRN    cloNIDine tablet 0.1 mg Q8H PRN    epoetin hira injection 4,000 Units Every Tues, Thurs, Sat    fluticasone propionate 50 mcg/actuation nasal spray 50 mcg BID    furosemide tablet 40 mg Daily    heparin (porcine) injection 1,000 Units PRN    heparin (porcine) injection 1,000 Units PRN    heparin 25,000 units in dextrose 5% (100 units/ml) IV bolus from bag - ADDITIONAL PRN BOLUS - 30 units/kg PRN    heparin 25,000 units in dextrose 5% (100 units/ml) IV bolus from bag - ADDITIONAL PRN BOLUS - 60 units/kg PRN    heparin 25,000 units in dextrose 5% 250 mL (100 units/mL) infusion LOW INTENSITY nomogram - [facility] Continuous    levothyroxine tablet 25 mcg Before breakfast    LIDOcaine 5 % patch 1 patch Q24H    magnesium oxide tablet 400 mg Daily    meclizine  tablet 25 mg TID PRN    melatonin tablet 6 mg Nightly PRN    methocarbamoL tablet 500 mg TID PRN    naloxone 0.4 mg/mL injection 0.02 mg PRN    ondansetron injection 4 mg Q8H PRN    pantoprazole EC tablet 40 mg BID AC    predniSONE tablet 5 mg Daily    prochlorperazine injection Soln 5 mg Q6H PRN    quetiapine split tablet 12.5 mg QHS    senna-docusate 8.6-50 mg per tablet 1 tablet BID PRN    simethicone chewable tablet 80 mg QID PRN    sodium chloride 0.9% bolus 250 mL 250 mL PRN    sodium chloride 0.9% bolus 250 mL 250 mL PRN    sodium chloride 0.9% bolus 250 mL 250 mL PRN    sodium chloride 0.9% flush 10 mL PRN    sodium chloride 0.9% flush 10 mL Q6H PRN    sucralfate tablet 1 g TID PRN    tiZANidine tablet 4 mg BID PRN    traMADoL tablet 50 mg Q8H PRN    white petrolatum 41 % ointment Daily     Facility-Administered Medications Ordered in Other Encounters   Medication Frequency    celecoxib capsule 400 mg Once    fentaNYL 50 mcg/mL injection  mcg PRN    LIDOcaine (PF) 10 mg/ml (1%) injection 10 mg Once PRN    LIDOcaine (PF) 10 mg/ml (1%) injection 10 mg Once    midazolam (VERSED) 1 mg/mL injection 0.5-4 mg PRN    ropivacaine 0.2% [facility] PainPRO Pump infusion 500 ML Continuous       Objective:     Vital Signs (Most Recent):  Temp: 98.2 °F (36.8 °C) (01/26/23 0542)  Pulse: 74 (01/26/23 0542)  Resp: 20 (01/26/23 0542)  BP: (!) 171/82 (01/26/23 0542)  SpO2: 95 % (01/26/23 0542)   Vital Signs (24h Range):  Temp:  [98 °F (36.7 °C)-98.4 °F (36.9 °C)] 98.2 °F (36.8 °C)  Pulse:  [59-80] 74  Resp:  [12-20] 20  SpO2:  [95 %-100 %] 95 %  BP: (143-195)/(65-97) 171/82     Weight: 63.3 kg (139 lb 8.8 oz) (01/24/23 0340)  Body mass index is 26.37 kg/m².  Body surface area is 1.65 meters squared.    I/O last 3 completed shifts:  In: 560 [P.O.:360; Other:200]  Out: 682 [Urine:200; Other:482]    Physical Exam  Vitals and nursing note reviewed.   Constitutional:       General: She is awake. She is not  in acute distress.     Appearance: Normal appearance. She is overweight. She is not ill-appearing or diaphoretic.   HENT:      Head: Normocephalic and atraumatic.      Right Ear: External ear normal.      Left Ear: External ear normal.      Nose: Nose normal.      Mouth/Throat:      Mouth: Mucous membranes are moist.      Pharynx: Oropharynx is clear. No oropharyngeal exudate or posterior oropharyngeal erythema.   Eyes:      General: No scleral icterus.        Right eye: No discharge.         Left eye: No discharge.      Extraocular Movements: Extraocular movements intact.      Conjunctiva/sclera: Conjunctivae normal.   Cardiovascular:      Rate and Rhythm: Normal rate.      Heart sounds: Murmur heard.   Systolic murmur is present with a grade of 1/6.     No friction rub. No gallop.      Comments: Bilateral pitting lower extremity edema which extends proximally.  Pulmonary:      Effort: Pulmonary effort is normal. No respiratory distress.      Breath sounds: No wheezing, rhonchi or rales.   Chest:      Comments: Tunneled HD catheters to right and left chest wall.   Abdominal:      General: Bowel sounds are normal. There is no distension.      Palpations: Abdomen is soft.      Tenderness: There is no abdominal tenderness.   Musculoskeletal:      Cervical back: Neck supple.      Right lower le+ Pitting Edema present.      Left lower le+ Pitting Edema present.   Skin:     General: Skin is warm and dry.      Capillary Refill: Capillary refill takes less than 2 seconds.      Coloration: Skin is not jaundiced.      Findings: No erythema.   Neurological:      General: No focal deficit present.      Mental Status: She is alert. Mental status is at baseline.      Cranial Nerves: No cranial nerve deficit.   Psychiatric:         Mood and Affect: Mood normal.         Behavior: Behavior normal. Behavior is cooperative.       Significant Labs:  BMP:   Recent Labs   Lab 23  0359   GLU 76      K 3.9       CO2 24   BUN 33*   CREATININE 3.2*   CALCIUM 8.8   MG 2.0       CBC:   Recent Labs   Lab 01/24/23  0549   WBC 12.25   RBC 2.99*   HGB 8.3*   HCT 26.4*      MCV 88   MCH 27.8   MCHC 31.4*       CMP:   Recent Labs   Lab 01/21/23  0509 01/22/23  0330 01/26/23  0359   GLU 78   < > 76   CALCIUM 8.9   < > 8.8   ALBUMIN 2.4*  --   --       < > 143   K 4.0   < > 3.9   CO2 24   < > 24      < > 105   BUN 73*   < > 33*   CREATININE 5.7*   < > 3.2*    < > = values in this interval not displayed.       Coagulation:   Recent Labs   Lab 01/19/23  2246 01/20/23  0413 01/26/23  0359   INR 1.1  --   --    APTT 33.7*   < > 51.0*    < > = values in this interval not displayed.       LFTs:   Recent Labs   Lab 01/21/23  0509   ALBUMIN 2.4*       Microbiology Results (last 7 days)       ** No results found for the last 168 hours. **          Specimen (24h ago, onward)      None          Significant Imaging:  I have reviewed all imagining in the last 24 hours.    Assessment/Plan:     * Bacteremia due to Enterococcus  - management per ID and primary  - repeat blood cultures NGTD    Acute deep vein thrombosis (DVT) of non-extremity vein - subclavian  - management per primary team  - transitioned to Eliquis on 1/26    Urinary tract infection due to extended-spectrum beta lactamase (ESBL) producing Escherichia coli  - Infectious Disease consulted and following  - management per primary team    Anemia due to chronic kidney disease  - goal hemoglobin 10-12,   - most recent iron panel with iron 15, transferrin 138, TIBC 204, 7% saturation and ferritin 378  - transfuse for hemoglobin < 7.0  - defer IV iron in light of recent bacteremia, currently receiving EPO    Primary pauci-immune necrotizing and crescentic glomerulonephritis  Microscopic polyangiitis  Acute renal failure on dialysis  WILFREDO (acute kidney injury)  Miscroscopic polyangiitis with renal (biopsy proven pauci immune necrotizing & crescentic glomerulonephritis) and  pulmonary involvement s/p Solumedrol 1,000 mg IV x3 doses, PLEX x5 sessions (10/26/2022, 10/27/2022, 10/29/2022, 10/30/2022, 11/01/2022, 11/02/2022, 11/03/2022), Rituximab 375 mg/m^2 x4 (600 mg) on 10/27/2022, 11/03/2922,11/10/2022,11/172022) with cyclophosphamide 7.5 mg/kg on 10/27/2022 and 11/10/2022 (every 14 days). Now iHD for which she receives HD on one but usually twice weekly for metabolic clearance via tunneled HD catheter roughly x2 a week with last HD Friday (01/06/2023).    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  2)  Necrotizing cresentic glomerulonephritis/[id number] necrotizing cresecentic glomerulonephritis.  3)  Focal acute pyelonephritis.  4)  Mild-to-moderate arterionephrosclerosis    Plan/Recommendations:  - no acute indications for HD today  - anticipate right TDC removal prior to discharge, IR consulted however patient and daughter to amendable to removal at this time  - can continue Lasix 40 mg PO daily for now  - daily RFPs and magnesium  - currently on prednisone 5 mg daily with atovaquone 1.5 grams faily for PJP prophylaxis   - renal diet if not NPO  - strict I/Os and daily weights  - renally dose all medications to eGFR  - avoid nephrotoxic agents when feasible (i.e. intra-arterial contrast, NSAIDs, supra-therapeutic vancomycin troughs, etc.)    Primary hypertension  - management per primary team    Hypothyroid  - management per primary team    Thank you for your consult. I will follow-up with patient. Please contact us if you have any additional questions.    Pj Paz MD  Nephrology  [facility] - Intensive Care ([place])

## 2023-01-26 NOTE — PT/OT/SLP PROGRESS
Physical Therapy      Patient Name:  Kristin Goodman   MRN:  [id number]    PT to bedside for PT treatment session. Pt declined therapy services at  this time despite max encouragement. PT to return when appropriate.

## 2023-01-26 NOTE — SUBJECTIVE & OBJECTIVE
Interval History: S/p placement of L TDC by IR 1/25. Pt frustrated regarding right TDC not being removed during placement of left TDC. Pt currently refusing removal of R TDC today, stating she wants to wait until tomorrow.      Review of Systems   Constitutional:  Positive for activity change. Negative for fatigue and fever.   HENT:  Negative for sore throat and trouble swallowing.    Eyes:  Negative for visual disturbance.   Respiratory:  Negative for cough and shortness of breath.    Cardiovascular:  Negative for chest pain and leg swelling.   Gastrointestinal:  Negative for abdominal distention and abdominal pain.   Genitourinary:  Negative for difficulty urinating.   Musculoskeletal:  Negative for back pain and gait problem.   Skin:  Negative for color change.   Neurological:  Positive for weakness. Negative for dizziness, light-headedness and headaches.   Psychiatric/Behavioral:  Negative for agitation and confusion.    Objective:     Vital Signs (Most Recent):  Temp: 97.6 °F (36.4 °C) (01/26/23 1055)  Pulse: 76 (01/26/23 1637)  Resp: 18 (01/26/23 1055)  BP: (!) 163/76 (01/26/23 1055)  SpO2: (!) 94 % (01/26/23 1055)   Vital Signs (24h Range):  Temp:  [97.6 °F (36.4 °C)-98.4 °F (36.9 °C)] 97.6 °F (36.4 °C)  Pulse:  [59-80] 76  Resp:  [17-20] 18  SpO2:  [94 %-95 %] 94 %  BP: (143-195)/(74-97) 163/76     Weight: 63.3 kg (139 lb 8.8 oz)  Body mass index is 26.37 kg/m².    Intake/Output Summary (Last 24 hours) at 1/26/2023 1703  Last data filed at 1/26/2023 1454  Gross per 24 hour   Intake 1361.21 ml   Output 982 ml   Net 379.21 ml        Physical Exam  Vitals reviewed.   Constitutional:       General: She is not in acute distress.     Appearance: Normal appearance. She is not toxic-appearing.   HENT:      Head: Normocephalic and atraumatic.   Eyes:      General: No scleral icterus.  Cardiovascular:      Rate and Rhythm: Normal rate and regular rhythm.      Pulses: Normal pulses.      Heart sounds: No murmur  heard.  Pulmonary:      Effort: Pulmonary effort is normal. No respiratory distress.      Breath sounds: No wheezing, rhonchi or rales.   Chest:      Comments: L TDC  Abdominal:      General: Bowel sounds are normal. There is no distension.      Tenderness: There is no abdominal tenderness. There is no guarding.   Musculoskeletal:      Right lower leg: No edema.      Left lower leg: No edema.   Skin:     General: Skin is warm.   Neurological:      General: No focal deficit present.      Mental Status: She is alert and oriented to person, place, and time. Mental status is at baseline.   Psychiatric:         Behavior: Behavior normal.         Thought Content: Thought content normal.

## 2023-01-27 LAB
ANION GAP SERPL CALC-SCNC: 14 MMOL/L (ref 8–16)
APTT BLDCRRT: 50.9 SEC (ref 21–32)
BASOPHILS # BLD AUTO: 0.02 K/UL (ref 0–0.2)
BASOPHILS NFR BLD: 0.2 % (ref 0–1.9)
BUN SERPL-MCNC: 33 MG/DL (ref 8–23)
CALCIUM SERPL-MCNC: 9 MG/DL (ref 8.7–10.5)
CHLORIDE SERPL-SCNC: 104 MMOL/L (ref 95–110)
CO2 SERPL-SCNC: 21 MMOL/L (ref 23–29)
CREAT SERPL-MCNC: 3.7 MG/DL (ref 0.5–1.4)
DIFFERENTIAL METHOD: ABNORMAL
EOSINOPHIL # BLD AUTO: 0 K/UL (ref 0–0.5)
EOSINOPHIL NFR BLD: 0.1 % (ref 0–8)
ERYTHROCYTE [DISTWIDTH] IN BLOOD BY AUTOMATED COUNT: 16.1 % (ref 11.5–14.5)
EST. GFR  (NO RACE VARIABLE): 12.2 ML/MIN/1.73 M^2
GLUCOSE SERPL-MCNC: 73 MG/DL (ref 70–110)
HCT VFR BLD AUTO: 32.1 % (ref 37–48.5)
HGB BLD-MCNC: 9.9 G/DL (ref 12–16)
IMM GRANULOCYTES # BLD AUTO: 0.31 K/UL (ref 0–0.04)
IMM GRANULOCYTES NFR BLD AUTO: 2.4 % (ref 0–0.5)
LYMPHOCYTES # BLD AUTO: 2.8 K/UL (ref 1–4.8)
LYMPHOCYTES NFR BLD: 22.3 % (ref 18–48)
MAGNESIUM SERPL-MCNC: 2 MG/DL (ref 1.6–2.6)
MCH RBC QN AUTO: 27.7 PG (ref 27–31)
MCHC RBC AUTO-ENTMCNC: 30.8 G/DL (ref 32–36)
MCV RBC AUTO: 90 FL (ref 82–98)
MONOCYTES # BLD AUTO: 0.8 K/UL (ref 0.3–1)
MONOCYTES NFR BLD: 5.9 % (ref 4–15)
NEUTROPHILS # BLD AUTO: 8.8 K/UL (ref 1.8–7.7)
NEUTROPHILS NFR BLD: 69.1 % (ref 38–73)
NRBC BLD-RTO: 0 /100 WBC
PHOSPHATE SERPL-MCNC: 3.2 MG/DL (ref 2.7–4.5)
PLATELET # BLD AUTO: 355 K/UL (ref 150–450)
PMV BLD AUTO: 10.4 FL (ref 9.2–12.9)
POTASSIUM SERPL-SCNC: 3.7 MMOL/L (ref 3.5–5.1)
RBC # BLD AUTO: 3.58 M/UL (ref 4–5.4)
SODIUM SERPL-SCNC: 139 MMOL/L (ref 136–145)
WBC # BLD AUTO: 12.67 K/UL (ref 3.9–12.7)

## 2023-01-27 PROCEDURE — 12000002 HC ACUTE/MED SURGE SEMI-PRIVATE ROOM

## 2023-01-27 PROCEDURE — 63600175 PHARM REV CODE 636 W HCPCS: Performed by: HOSPITALIST

## 2023-01-27 PROCEDURE — 99232 SBSQ HOSP IP/OBS MODERATE 35: CPT | Mod: ,,, | Performed by: HOSPITALIST

## 2023-01-27 PROCEDURE — 36415 COLL VENOUS BLD VENIPUNCTURE: CPT | Performed by: STUDENT IN AN ORGANIZED HEALTH CARE EDUCATION/TRAINING PROGRAM

## 2023-01-27 PROCEDURE — 25000003 PHARM REV CODE 250: Performed by: HOSPITALIST

## 2023-01-27 PROCEDURE — 80048 BASIC METABOLIC PNL TOTAL CA: CPT | Performed by: STUDENT IN AN ORGANIZED HEALTH CARE EDUCATION/TRAINING PROGRAM

## 2023-01-27 PROCEDURE — 25000003 PHARM REV CODE 250: Performed by: INTERNAL MEDICINE

## 2023-01-27 PROCEDURE — 83735 ASSAY OF MAGNESIUM: CPT | Performed by: STUDENT IN AN ORGANIZED HEALTH CARE EDUCATION/TRAINING PROGRAM

## 2023-01-27 PROCEDURE — 99232 PR SUBSEQUENT HOSPITAL CARE,LEVL II: ICD-10-PCS | Mod: ,,, | Performed by: HOSPITALIST

## 2023-01-27 PROCEDURE — 85025 COMPLETE CBC W/AUTO DIFF WBC: CPT | Performed by: INTERNAL MEDICINE

## 2023-01-27 PROCEDURE — 84100 ASSAY OF PHOSPHORUS: CPT | Performed by: STUDENT IN AN ORGANIZED HEALTH CARE EDUCATION/TRAINING PROGRAM

## 2023-01-27 PROCEDURE — 85730 THROMBOPLASTIN TIME PARTIAL: CPT | Performed by: STUDENT IN AN ORGANIZED HEALTH CARE EDUCATION/TRAINING PROGRAM

## 2023-01-27 RX ORDER — HYDRALAZINE HYDROCHLORIDE 25 MG/1
50 TABLET, FILM COATED ORAL EVERY 8 HOURS
Status: DISCONTINUED | OUTPATIENT
Start: 2023-01-27 | End: 2023-02-03 | Stop reason: HOSPADM

## 2023-01-27 RX ORDER — CARVEDILOL 12.5 MG/1
12.5 TABLET ORAL 2 TIMES DAILY
Status: DISCONTINUED | OUTPATIENT
Start: 2023-01-27 | End: 2023-02-03 | Stop reason: HOSPADM

## 2023-01-27 RX ADMIN — LIDOCAINE 1 PATCH: 50 PATCH CUTANEOUS at 06:01

## 2023-01-27 RX ADMIN — APIXABAN 10 MG: 5 TABLET, FILM COATED ORAL at 09:01

## 2023-01-27 RX ADMIN — ATOVAQUONE 1500 MG: 750 SUSPENSION ORAL at 08:01

## 2023-01-27 RX ADMIN — HYDRALAZINE HYDROCHLORIDE 50 MG: 25 TABLET, FILM COATED ORAL at 09:01

## 2023-01-27 RX ADMIN — AMLODIPINE BESYLATE 10 MG: 10 TABLET ORAL at 08:01

## 2023-01-27 RX ADMIN — PANTOPRAZOLE SODIUM 40 MG: 40 TABLET, DELAYED RELEASE ORAL at 06:01

## 2023-01-27 RX ADMIN — LEVOTHYROXINE SODIUM 25 MCG: 25 TABLET ORAL at 06:01

## 2023-01-27 RX ADMIN — WHITE PETROLATUM: 1.75 OINTMENT TOPICAL at 08:01

## 2023-01-27 RX ADMIN — CLONIDINE HYDROCHLORIDE 0.1 MG: 0.1 TABLET ORAL at 06:01

## 2023-01-27 RX ADMIN — FLUTICASONE PROPIONATE 50 MCG: 50 SPRAY, METERED NASAL at 09:01

## 2023-01-27 RX ADMIN — FUROSEMIDE 40 MG: 40 TABLET ORAL at 08:01

## 2023-01-27 RX ADMIN — QUETIAPINE FUMARATE 12.5 MG: 25 TABLET ORAL at 09:01

## 2023-01-27 RX ADMIN — PREDNISONE 5 MG: 5 TABLET ORAL at 08:01

## 2023-01-27 RX ADMIN — TRAMADOL HYDROCHLORIDE 50 MG: 50 TABLET, COATED ORAL at 06:01

## 2023-01-27 RX ADMIN — CARVEDILOL 12.5 MG: 12.5 TABLET, FILM COATED ORAL at 09:01

## 2023-01-27 RX ADMIN — ACETAMINOPHEN 650 MG: 325 TABLET ORAL at 08:01

## 2023-01-27 RX ADMIN — CARVEDILOL 6.25 MG: 6.25 TABLET, FILM COATED ORAL at 08:01

## 2023-01-27 NOTE — ASSESSMENT & PLAN NOTE
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 Now iHD for which she receives HD on one but usually twice weekly for metabolic clearance via tunneled HD catheter roughly x2 a week with last HD Friday (01/06/2023).    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  2)  Necrotizing cresentic glomerulonephritis/[id number] necrotizing cresecentic glomerulonephritis.  3)  Focal acute pyelonephritis.  4)  Mild-to-moderate arterionephrosclerosis    Plan/Recommendations:  - no acute indications for HD today  - tentative plans to discharge following removal of right TDC today per IR  - can continue Lasix 40 mg PO daily for now  - daily RFPs and magnesium  - currently on prednisone 5 mg daily with atovaquone 1.5 grams faily for PJP prophylaxis   - renal diet if not NPO  - strict I/Os and daily weights  - renally dose all medications to eGFR  - avoid nephrotoxic agents when feasible (i.e. intra-arterial contrast, NSAIDs, supra-therapeutic vancomycin troughs, etc.)

## 2023-01-27 NOTE — PLAN OF CARE
HD chair time has not bee setup awaiting clearance from Darshan, attending and nurse notified pt could not dc due to this issue

## 2023-01-27 NOTE — ASSESSMENT & PLAN NOTE
-due to Microscopic Polyangiitis  - IR consulted. S/p permcath placement  on 1/17/23   - US of UE showed DVTs in R and L UE.   - CXR:The dialysis catheter remains in position  - S/p R TDC replacement on 01/23 by IR.  On 01/24, TDC was not working again for hemodialysis.  Consulted IR again.  S/p Left TDC placement on 1/25.   - Nephrology following. Patient will need HD chair set up prior to d/c.  -Removal of R TDC today  -Discharge planning. Awaiting HD chair

## 2023-01-27 NOTE — PLAN OF CARE
[facility] scheduled pcp f/u appt   Future Appointments   Date Time Provider Department Center   2/2/2023 11:00 AM [facility] [place]   2/23/2023 11:00 AM Layne Aguirre MD [facility]

## 2023-01-27 NOTE — ASSESSMENT & PLAN NOTE
- goal hemoglobin 10-12,   - most recent iron panel with iron 15, transferrin 138, TIBC 204, 7% saturation and ferritin 378  - transfuse for hemoglobin < 7.0  - defer IV iron in light of recent bacteremia, currently receiving EPO 4,000 units SQ with HD

## 2023-01-27 NOTE — PROGRESS NOTES
[facility] ([address] Medicine  Progress Note    Patient Name: Kristin Goodman  MRN: [id number]  Patient Class: IP- Inpatient   Admission Date: 1/9/2023  Length of Stay: 18 days  Attending Physician: Paul Clayton MD  Primary Care Provider: Lori Hernandez MD        Subjective:     Principal Problem:Bacteremia due to Enterococcus        HPI:  77-year-old  woman with HTN, hypothyroidism, HFpEF, and osteoarthritis and new acute renal failure due to new diagnosis of Microscopic Polyangiitis s/p renal biopsy and requiring hemodialysis is transferred from [facility] for concerns of new bacteremia.     She was hospitalized from 10/17/22-12/13/22 then transferred to [facility].  Microscopic polyangiitis with pulmonary and renal involvement had suffered respiratory failure with diffuse alveolar hemorrhage, gi bleeding, and renal replacement therapy. S/p Rheumatology consultation and pulse IV steroids + plasmapheresis with Transfusion medicine started on Rituximab and Cyclophosphamide.  Continues on Steroid taper for immunosuppression.     More recently earlier this week noted to have a urine culture grow ESBL E.coli Cystitis, started on meropenem, then she had blood cultures from 1/5 and 1/7 that have grown Enterococcus (amp sensitive) vancomycin started today, patient had sluggish HD catheter with dialysis.   Also ED report that patient may have had syncope during HD today.     She is transferred for continued infectious workup and management as well as removal of tunneled HD line for line holiday.       Overview/Hospital Course:  Seen by ID and nephrology. Plan for line holiday and IR consulted.      Interval History: Pt in chair during evaluation. Appears frustrated. Awaiting removal of R TDC today. Unfortunately HD chair still not set up.     Review of Systems   Constitutional:  Positive for activity change. Negative for fatigue and fever.   HENT:  Negative for sore  throat and trouble swallowing.    Eyes:  Negative for visual disturbance.   Respiratory:  Negative for cough and shortness of breath.    Cardiovascular:  Negative for chest pain and leg swelling.   Gastrointestinal:  Negative for abdominal distention and abdominal pain.   Genitourinary:  Negative for difficulty urinating.   Musculoskeletal:  Negative for back pain and gait problem.   Skin:  Negative for color change.   Neurological:  Positive for weakness. Negative for dizziness, light-headedness and headaches.   Psychiatric/Behavioral:  Negative for agitation and confusion.    Objective:     Vital Signs (Most Recent):  Temp: 98 °F (36.7 °C) (01/27/23 1118)  Pulse: 75 (01/27/23 1123)  Resp: 18 (01/27/23 1118)  BP: (!) 110/56 (01/27/23 1415)  SpO2: 96 % (01/27/23 1118)   Vital Signs (24h Range):  Temp:  [97.5 °F (36.4 °C)-98.9 °F (37.2 °C)] 98 °F (36.7 °C)  Pulse:  [71-84] 75  Resp:  [18-20] 18  SpO2:  [95 %-97 %] 96 %  BP: (110-189)/(56-88) 110/56     Weight: 63.3 kg (139 lb 8.8 oz)  Body mass index is 26.37 kg/m².    Intake/Output Summary (Last 24 hours) at 1/27/2023 1527  Last data filed at 1/27/2023 1428  Gross per 24 hour   Intake 50 ml   Output --   Net 50 ml        Physical Exam  Vitals reviewed.   Constitutional:       General: She is not in acute distress.     Appearance: Normal appearance. She is not toxic-appearing.   HENT:      Head: Normocephalic and atraumatic.   Eyes:      General: No scleral icterus.  Cardiovascular:      Rate and Rhythm: Normal rate and regular rhythm.      Pulses: Normal pulses.      Heart sounds: No murmur heard.  Pulmonary:      Effort: Pulmonary effort is normal. No respiratory distress.      Breath sounds: No wheezing, rhonchi or rales.   Chest:      Comments: L TDC  Abdominal:      General: Bowel sounds are normal. There is no distension.      Tenderness: There is no abdominal tenderness. There is no guarding.   Musculoskeletal:      Right lower leg: No edema.      Left lower  leg: No edema.   Skin:     General: Skin is warm.   Neurological:      General: No focal deficit present.      Mental Status: She is alert and oriented to person, place, and time. Mental status is at baseline.   Psychiatric:         Behavior: Behavior normal.         Thought Content: Thought content normal.             Assessment/Plan:      * Bacteremia due to Enterococcus  Positive blood cxs 1/5, 1/7, 1/9. Received 1 gm IV vancomycin at LTAC on 1/10.   HD tunelled cath removed 1/10.  Also has midline from LTAC - removed. Surface echo - no vegetation. ID consulted. Last surveillance bcxs 1/11/23 NGTD  Per ID - 2-weeks vanc from negative bcxs/line removal - end date 1/24. Completed.    WILFREDO (acute kidney injury)        Acute deep vein thrombosis (DVT) of non-extremity vein - subclavian  Heparin drip started 1/19 pending procedures to re-establish HD access.  Ultrasound bilateral upper extremity t:    Pauci-immune vasculitis  Continue steroid taper    Urinary tract infection due to extended-spectrum beta lactamase (ESBL) producing Escherichia coli  Urine culture from 1/05 with ESBL E.coli   -Ertapenem renal dosing 500mg IV q24 ordered, completed 5 days (1/5-1/10). Missed dose on 1/9 due to transfer to hospital    Anemia due to chronic kidney disease  Has required transfusions during recent inpatient/LTAC stays   -was receiving EPO infusion at nephrology direction.   Hb 6.7 on 01/16.  Ordered a unit of blood.  Consent obtained.  Continue to monitor CBC.  Goal for transfusion hemoglobin less than 7.    Primary pauci-immune necrotizing and crescentic glomerulonephritis  Patient with acute kidney injury likely due to microscopic polyangiits with granulomatosis (MPA) WILFREDO is currently stable. Labs reviewed- Renal function/electrolytes with Estimated Creatinine Clearance: 11.2 mL/min (A) (based on SCr of 3.7 mg/dL (H)). according to latest data. Monitor urine output and serial BMP and adjust therapy as needed. Avoid  nephrotoxins and renally dose meds for GFR listed above.  Had complex course with DAH, requiring pulse dose steroids, plasmapheresis and then rituximab and cyclophosphamide  -continue prednisone taper  -continue atovaquone for PJP ppx  -will need outpatient Rheum follow up    Gastric reflux  Continue BID PPI    Dysphagia  Continue renal diet   SLP following     Acute renal failure on dialysis  -due to Microscopic Polyangiitis  - IR consulted. S/p permcath placement  on 1/17/23   - US of UE showed DVTs in R and L UE.   - CXR:The dialysis catheter remains in position  - S/p R TDC replacement on 01/23 by IR.  On 01/24, TDC was not working again for hemodialysis.  Consulted IR again.  S/p Left TDC placement on 1/25.   - Nephrology following. Patient will need HD chair set up prior to d/c.  -Removal of R TDC today  -Discharge planning. Awaiting HD chair    Microscopic polyangiitis  -continue steroid taper   -patient is on OI prophylaxis with atovaquone 1500mg po daily  -continued on Protonix 40mg po bid while on glucocorticoids.     Impaired mobility  OT/PT     Chronic diastolic heart failure  Has preserved EF   -HD was primary volume maintenance   -continue Lasix 40mg po daily - consider higher or more frequent doses are required during her LIne holiday     Syncope  Report of possible syncope with HD,  Dizzy spells noted per LTAC notes - pt s/p MRI brain on 12/8 w/o acute findings   -neurology prior eval has recommended PT/OT for vestibular therapy  -Future outpatient cardiology visit for possible tilt-table eval.   -refer to neurology at discharge for BPPV follow up    Primary hypertension  Continue carvedilol  Add amlodipine  -home lisinopril is on hold due to her WILFREDO    Hypothyroid  Continue levothyroxine 25mcg       VTE Risk Mitigation (From admission, onward)         Ordered     apixaban tablet 10 mg  2 times daily         01/26/23 1244     heparin (porcine) injection 1,000 Units  As needed (PRN)         01/25/23  1128     heparin (porcine) injection 1,000 Units  As needed (PRN)         01/23/23 1321     heparin (porcine) injection 1,000 Units  As needed (PRN)         01/17/23 1421     heparin (porcine) injection 1,000 Units  As needed (PRN)         01/09/23 2330     Place sequential compression device  Until discontinued         01/09/23 2330                Discharge Planning   ANTHONY: 1/30/2023     Code Status: Full Code   Is the patient medically ready for discharge?: No    Reason for patient still in hospital (select all that apply): Pending disposition  Discharge Plan A: Home Health   Discharge Delays: None known at this time              Paul Clayton MD  Department of Hospital Medicine   [facility] - Intensive Care ([place])

## 2023-01-27 NOTE — PLAN OF CARE
Via phone, Yu from [facility]--[phone] requested CXR and most recent nephrology note.  Faxed to [phone].    9:57 AM Calling Yu back [phone] to see if she rec'd requested documents and how chair placement is looking.  Per Zuleika, Yu is gone for the day.  She has everything she needs for chair placement - they will give to MD and call CM back when MD has looked at paperwork.    1:18 PM Rec'd call from Roro Goodman - she wanted  to know if any updates about  implanted port or HD.  Instructed that HD info has been sent to [facility]; they have not called back.  Pt will be followed by nephrologist at HD.      1:27 PM Calling [facility] at [phone] to see if any  updates on HD chair for this pt.  Zuleika states no updates at this time.      ALIZA ToussaintN, BS, RN, CCM

## 2023-01-27 NOTE — PROGRESS NOTES
Radiology arrived for portable film / pt resting w/ NAD / called and waiting on radiologist to read film

## 2023-01-27 NOTE — SUBJECTIVE & OBJECTIVE
Interval History: Pt in chair during evaluation. Appears frustrated. Awaiting removal of R TDC today. Unfortunately HD chair still not set up.     Review of Systems   Constitutional:  Positive for activity change. Negative for fatigue and fever.   HENT:  Negative for sore throat and trouble swallowing.    Eyes:  Negative for visual disturbance.   Respiratory:  Negative for cough and shortness of breath.    Cardiovascular:  Negative for chest pain and leg swelling.   Gastrointestinal:  Negative for abdominal distention and abdominal pain.   Genitourinary:  Negative for difficulty urinating.   Musculoskeletal:  Negative for back pain and gait problem.   Skin:  Negative for color change.   Neurological:  Positive for weakness. Negative for dizziness, light-headedness and headaches.   Psychiatric/Behavioral:  Negative for agitation and confusion.    Objective:     Vital Signs (Most Recent):  Temp: 98 °F (36.7 °C) (01/27/23 1118)  Pulse: 75 (01/27/23 1123)  Resp: 18 (01/27/23 1118)  BP: (!) 110/56 (01/27/23 1415)  SpO2: 96 % (01/27/23 1118)   Vital Signs (24h Range):  Temp:  [97.5 °F (36.4 °C)-98.9 °F (37.2 °C)] 98 °F (36.7 °C)  Pulse:  [71-84] 75  Resp:  [18-20] 18  SpO2:  [95 %-97 %] 96 %  BP: (110-189)/(56-88) 110/56     Weight: 63.3 kg (139 lb 8.8 oz)  Body mass index is 26.37 kg/m².    Intake/Output Summary (Last 24 hours) at 1/27/2023 1527  Last data filed at 1/27/2023 1428  Gross per 24 hour   Intake 50 ml   Output --   Net 50 ml        Physical Exam  Vitals reviewed.   Constitutional:       General: She is not in acute distress.     Appearance: Normal appearance. She is not toxic-appearing.   HENT:      Head: Normocephalic and atraumatic.   Eyes:      General: No scleral icterus.  Cardiovascular:      Rate and Rhythm: Normal rate and regular rhythm.      Pulses: Normal pulses.      Heart sounds: No murmur heard.  Pulmonary:      Effort: Pulmonary effort is normal. No respiratory distress.      Breath sounds: No  wheezing, rhonchi or rales.   Chest:      Comments: L TDC  Abdominal:      General: Bowel sounds are normal. There is no distension.      Tenderness: There is no abdominal tenderness. There is no guarding.   Musculoskeletal:      Right lower leg: No edema.      Left lower leg: No edema.   Skin:     General: Skin is warm.   Neurological:      General: No focal deficit present.      Mental Status: She is alert and oriented to person, place, and time. Mental status is at baseline.   Psychiatric:         Behavior: Behavior normal.         Thought Content: Thought content normal.

## 2023-01-27 NOTE — SUBJECTIVE & OBJECTIVE
Interval History: Patient seen and examined this AM. NPO since midnight for right TDC removal today per IR. Afebrile overnight with pulse ranging from 80-70s bpm. Systolic blood pressures ranging from 180-160s mmHg. She is saturating +95% on room air with documented 300 mL of UOP in the last 24 hours. No indications for HD today. Tentative plans for discharge following right TDC removal today.    Review of patient's allergies indicates:   Allergen Reactions    Ampicillin     Peaches [peach (prunus persica)] Other (See Comments)     Pt unable to state type of reaction. Information obtained from daughter who states she was informed of allergy from patient.    Penicillins      Other reaction(s): Hives, anaphylaxis    Sulfa (sulfonamide antibiotics) Rash and Hives     Current Facility-Administered Medications   Medication Frequency    0.9%  NaCl infusion (for blood administration) Q24H PRN    acetaminophen tablet 650 mg Q4H PRN    albuterol-ipratropium 2.5 mg-0.5 mg/3 mL nebulizer solution 3 mL Q4H PRN    aluminum-magnesium hydroxide 200-200 mg/5 mL suspension 30 mL QID PRN    amLODIPine tablet 10 mg Daily    apixaban tablet 10 mg BID    atovaquone 750 mg/5 mL oral liquid 1,500 mg Daily    B-complex with vitamin C tablet 1 tablet Daily    bisacodyL suppository 10 mg Daily PRN    carvediloL tablet 6.25 mg BID    cloNIDine tablet 0.1 mg Q8H PRN    epoetin hira injection 4,000 Units Every Tues, Thurs, Sat    fluticasone propionate 50 mcg/actuation nasal spray 50 mcg BID    furosemide tablet 40 mg Daily    heparin (porcine) injection 1,000 Units PRN    heparin 25,000 units in dextrose 5% (100 units/ml) IV bolus from bag - ADDITIONAL PRN BOLUS - 30 units/kg PRN    heparin 25,000 units in dextrose 5% (100 units/ml) IV bolus from bag - ADDITIONAL PRN BOLUS - 60 units/kg PRN    levothyroxine tablet 25 mcg Before breakfast    LIDOcaine 5 % patch 1 patch Q24H    magnesium oxide tablet 400 mg Daily    meclizine tablet 25 mg TID  PRN    melatonin tablet 6 mg Nightly PRN    methocarbamoL tablet 500 mg TID PRN    naloxone 0.4 mg/mL injection 0.02 mg PRN    ondansetron injection 4 mg Q8H PRN    pantoprazole EC tablet 40 mg BID AC    predniSONE tablet 5 mg Daily    prochlorperazine injection Soln 5 mg Q6H PRN    quetiapine split tablet 12.5 mg QHS    senna-docusate 8.6-50 mg per tablet 1 tablet BID PRN    simethicone chewable tablet 80 mg QID PRN    sodium chloride 0.9% bolus 250 mL 250 mL PRN    sodium chloride 0.9% bolus 250 mL 250 mL PRN    sodium chloride 0.9% bolus 250 mL 250 mL PRN    sodium chloride 0.9% flush 10 mL PRN    sodium chloride 0.9% flush 10 mL Q6H PRN    sucralfate tablet 1 g TID PRN    tiZANidine tablet 4 mg BID PRN    traMADoL tablet 50 mg Q8H PRN    white petrolatum 41 % ointment Daily     Facility-Administered Medications Ordered in Other Encounters   Medication Frequency    celecoxib capsule 400 mg Once    fentaNYL 50 mcg/mL injection  mcg PRN    LIDOcaine (PF) 10 mg/ml (1%) injection 10 mg Once PRN    LIDOcaine (PF) 10 mg/ml (1%) injection 10 mg Once    midazolam (VERSED) 1 mg/mL injection 0.5-4 mg PRN    ropivacaine 0.2% [facility] PainPRO Pump infusion 500 ML Continuous       Objective:     Vital Signs (Most Recent):  Temp: 98.1 °F (36.7 °C) (01/27/23 0450)  Pulse: 75 (01/27/23 0450)  Resp: 18 (01/27/23 0450)  BP: (!) 167/73 (01/27/23 0450)  SpO2: 95 % (01/27/23 0450)   Vital Signs (24h Range):  Temp:  [97.5 °F (36.4 °C)-98.4 °F (36.9 °C)] 98.1 °F (36.7 °C)  Pulse:  [65-84] 75  Resp:  [18-20] 18  SpO2:  [94 %-97 %] 95 %  BP: (163-189)/(73-88) 167/73     Weight: 63.3 kg (139 lb 8.8 oz) (01/24/23 0340)  Body mass index is 26.37 kg/m².  Body surface area is 1.65 meters squared.    I/O last 3 completed shifts:  In: 801.2 [P.O.:480; I.V.:305; IV Piggyback:16.2]  Out: 300 [Urine:300]    Physical Exam  Vitals and nursing note reviewed.   Constitutional:       General: She is awake. She is not in acute distress.      Appearance: Normal appearance. She is overweight. She is not ill-appearing or diaphoretic.   HENT:      Head: Normocephalic and atraumatic.      Right Ear: External ear normal.      Left Ear: External ear normal.      Nose: Nose normal.      Mouth/Throat:      Mouth: Mucous membranes are moist.      Pharynx: Oropharynx is clear. No oropharyngeal exudate or posterior oropharyngeal erythema.   Eyes:      General: No scleral icterus.        Right eye: No discharge.         Left eye: No discharge.      Extraocular Movements: Extraocular movements intact.      Conjunctiva/sclera: Conjunctivae normal.   Cardiovascular:      Rate and Rhythm: Normal rate.      Heart sounds: Murmur heard.   Systolic murmur is present with a grade of 1/6.     No friction rub. No gallop.      Comments: Bilateral pitting lower extremity edema which extends proximally.  Pulmonary:      Effort: Pulmonary effort is normal. No respiratory distress.      Breath sounds: No wheezing, rhonchi or rales.   Chest:      Comments: Tunneled HD catheters to right and left chest wall.   Abdominal:      General: Bowel sounds are normal. There is no distension.      Palpations: Abdomen is soft.      Tenderness: There is no abdominal tenderness.   Musculoskeletal:      Cervical back: Neck supple.      Right lower le+ Pitting Edema present.      Left lower le+ Pitting Edema present.   Skin:     General: Skin is warm and dry.      Capillary Refill: Capillary refill takes less than 2 seconds.      Coloration: Skin is not jaundiced.      Findings: No erythema.   Neurological:      General: No focal deficit present.      Mental Status: She is alert. Mental status is at baseline.      Cranial Nerves: No cranial nerve deficit.   Psychiatric:         Mood and Affect: Mood normal.         Behavior: Behavior normal. Behavior is cooperative.       Significant Labs:  BMP:   Recent Labs   Lab 23  0512   GLU 73      K 3.7      CO2 21*   BUN 33*    CREATININE 3.7*   CALCIUM 9.0   MG 2.0       CBC:   Recent Labs   Lab 01/26/23  1025   WBC 12.90*   RBC 3.45*   HGB 9.8*   HCT 31.9*      MCV 93   MCH 28.4   MCHC 30.7*       CMP:   Recent Labs   Lab 01/21/23  0509 01/22/23  0330 01/27/23  0512   GLU 78   < > 73   CALCIUM 8.9   < > 9.0   ALBUMIN 2.4*  --   --       < > 139   K 4.0   < > 3.7   CO2 24   < > 21*      < > 104   BUN 73*   < > 33*   CREATININE 5.7*   < > 3.7*    < > = values in this interval not displayed.       Coagulation:   Recent Labs   Lab 01/26/23  0359   APTT 51.0*       LFTs:   Recent Labs   Lab 01/21/23  0509   ALBUMIN 2.4*       Microbiology Results (last 7 days)       ** No results found for the last 168 hours. **          Specimen (24h ago, onward)      None          Significant Imaging:  I have reviewed all imagining in the last 24 hours.

## 2023-01-27 NOTE — ASSESSMENT & PLAN NOTE
Patient with acute kidney injury likely due to microscopic polyangiits with granulomatosis (MPA) WILFREDO is currently stable. Labs reviewed- Renal function/electrolytes with Estimated Creatinine Clearance: 11.2 mL/min (A) (based on SCr of 3.7 mg/dL (H)). according to latest data. Monitor urine output and serial BMP and adjust therapy as needed. Avoid nephrotoxins and renally dose meds for GFR listed above.  Had complex course with DAH, requiring pulse dose steroids, plasmapheresis and then rituximab and cyclophosphamide  -continue prednisone taper  -continue atovaquone for PJP ppx  -will need outpatient Rheum follow up

## 2023-01-27 NOTE — PROGRESS NOTES
[facility] ([place] [address])  Nephrology  Progress Note    Patient Name: Kristin Goodman  MRN: [id number]  Admission Date: 1/9/2023  Hospital Length of Stay: 18 days  Attending Provider: Paul Clayton MD   Primary Care Physician: Lori Hernandez MD  Principal Problem:Bacteremia due to Enterococcus    Subjective:     HPI: Ms Goodman is a 77-year-old woman with hypertension, hypothyroidism, HFpEF (most recent TTE with EF 65% with G1DD), pulmonary hypertension, current ESBL E. coli UTI, osteoarthritis, chronic back pain, DONAVAN and microscopic polyangiitis complicated base on biopsy from 10/26 by renal failure, GIB and respiratory failure with history of diffuse alveolar hemorrhage s/p IV Solumedrol, PLEX x5 sessions, Rituximab x4 doses and cyclophosphamide however now  just on steroid taper (cyclophosphamide appears to be hold secondary to anemia) now iHD dependent twice weekly via tunneled HD catheter for metabolic clearance (follows with Dr. Mojica with [facility]) who presented from [facility] for TDC removal in the setting of positive blood cultures growing Enterococcus faecalis and Bacillus species from cultures obtained on 1/5 & 1/7. In addition patient with reported syncopal event and sluggish flows on HD on 1/9 (incomplete session, daughter attributes to iron infusion) with last full session of iHD being on 1/6. She reports still making good urine without any urinary complaints today. Nephrology has been consulted for inpatient HD needs.      Interval History: Patient seen and examined this AM. NPO since midnight for right TDC removal today per IR. Afebrile overnight with pulse ranging from 80-70s bpm. Systolic blood pressures ranging from 180-160s mmHg. She is saturating +95% on room air with documented 300 mL of UOP in the last 24 hours. No indications for HD today. Tentative plans for discharge following right TDC removal today.    Review of patient's allergies indicates:   Allergen  Reactions    Ampicillin     Peaches [peach (prunus persica)] Other (See Comments)     Pt unable to state type of reaction. Information obtained from daughter who states she was informed of allergy from patient.    Penicillins      Other reaction(s): Hives, anaphylaxis    Sulfa (sulfonamide antibiotics) Rash and Hives     Current Facility-Administered Medications   Medication Frequency    0.9%  NaCl infusion (for blood administration) Q24H PRN    acetaminophen tablet 650 mg Q4H PRN    albuterol-ipratropium 2.5 mg-0.5 mg/3 mL nebulizer solution 3 mL Q4H PRN    aluminum-magnesium hydroxide 200-200 mg/5 mL suspension 30 mL QID PRN    amLODIPine tablet 10 mg Daily    apixaban tablet 10 mg BID    atovaquone 750 mg/5 mL oral liquid 1,500 mg Daily    B-complex with vitamin C tablet 1 tablet Daily    bisacodyL suppository 10 mg Daily PRN    carvediloL tablet 6.25 mg BID    cloNIDine tablet 0.1 mg Q8H PRN    epoetin hira injection 4,000 Units Every Tues, Thurs, Sat    fluticasone propionate 50 mcg/actuation nasal spray 50 mcg BID    furosemide tablet 40 mg Daily    heparin (porcine) injection 1,000 Units PRN    heparin 25,000 units in dextrose 5% (100 units/ml) IV bolus from bag - ADDITIONAL PRN BOLUS - 30 units/kg PRN    heparin 25,000 units in dextrose 5% (100 units/ml) IV bolus from bag - ADDITIONAL PRN BOLUS - 60 units/kg PRN    levothyroxine tablet 25 mcg Before breakfast    LIDOcaine 5 % patch 1 patch Q24H    magnesium oxide tablet 400 mg Daily    meclizine tablet 25 mg TID PRN    melatonin tablet 6 mg Nightly PRN    methocarbamoL tablet 500 mg TID PRN    naloxone 0.4 mg/mL injection 0.02 mg PRN    ondansetron injection 4 mg Q8H PRN    pantoprazole EC tablet 40 mg BID AC    predniSONE tablet 5 mg Daily    prochlorperazine injection Soln 5 mg Q6H PRN    quetiapine split tablet 12.5 mg QHS    senna-docusate 8.6-50 mg per tablet 1 tablet BID PRN    simethicone chewable tablet 80 mg QID  PRN    sodium chloride 0.9% bolus 250 mL 250 mL PRN    sodium chloride 0.9% bolus 250 mL 250 mL PRN    sodium chloride 0.9% bolus 250 mL 250 mL PRN    sodium chloride 0.9% flush 10 mL PRN    sodium chloride 0.9% flush 10 mL Q6H PRN    sucralfate tablet 1 g TID PRN    tiZANidine tablet 4 mg BID PRN    traMADoL tablet 50 mg Q8H PRN    white petrolatum 41 % ointment Daily     Facility-Administered Medications Ordered in Other Encounters   Medication Frequency    celecoxib capsule 400 mg Once    fentaNYL 50 mcg/mL injection  mcg PRN    LIDOcaine (PF) 10 mg/ml (1%) injection 10 mg Once PRN    LIDOcaine (PF) 10 mg/ml (1%) injection 10 mg Once    midazolam (VERSED) 1 mg/mL injection 0.5-4 mg PRN    ropivacaine 0.2% [facility] PainPRO Pump infusion 500 ML Continuous       Objective:     Vital Signs (Most Recent):  Temp: 98.1 °F (36.7 °C) (01/27/23 0450)  Pulse: 75 (01/27/23 0450)  Resp: 18 (01/27/23 0450)  BP: (!) 167/73 (01/27/23 0450)  SpO2: 95 % (01/27/23 0450)   Vital Signs (24h Range):  Temp:  [97.5 °F (36.4 °C)-98.4 °F (36.9 °C)] 98.1 °F (36.7 °C)  Pulse:  [65-84] 75  Resp:  [18-20] 18  SpO2:  [94 %-97 %] 95 %  BP: (163-189)/(73-88) 167/73     Weight: 63.3 kg (139 lb 8.8 oz) (01/24/23 0340)  Body mass index is 26.37 kg/m².  Body surface area is 1.65 meters squared.    I/O last 3 completed shifts:  In: 801.2 [P.O.:480; I.V.:305; IV Piggyback:16.2]  Out: 300 [Urine:300]    Physical Exam  Vitals and nursing note reviewed.   Constitutional:       General: She is awake. She is not in acute distress.     Appearance: Normal appearance. She is overweight. She is not ill-appearing or diaphoretic.   HENT:      Head: Normocephalic and atraumatic.      Right Ear: External ear normal.      Left Ear: External ear normal.      Nose: Nose normal.      Mouth/Throat:      Mouth: Mucous membranes are moist.      Pharynx: Oropharynx is clear. No oropharyngeal exudate or posterior oropharyngeal erythema.   Eyes:       General: No scleral icterus.        Right eye: No discharge.         Left eye: No discharge.      Extraocular Movements: Extraocular movements intact.      Conjunctiva/sclera: Conjunctivae normal.   Cardiovascular:      Rate and Rhythm: Normal rate.      Heart sounds: Murmur heard.   Systolic murmur is present with a grade of 1/6.     No friction rub. No gallop.      Comments: Bilateral pitting lower extremity edema which extends proximally.  Pulmonary:      Effort: Pulmonary effort is normal. No respiratory distress.      Breath sounds: No wheezing, rhonchi or rales.   Chest:      Comments: Tunneled HD catheters to right and left chest wall.   Abdominal:      General: Bowel sounds are normal. There is no distension.      Palpations: Abdomen is soft.      Tenderness: There is no abdominal tenderness.   Musculoskeletal:      Cervical back: Neck supple.      Right lower le+ Pitting Edema present.      Left lower le+ Pitting Edema present.   Skin:     General: Skin is warm and dry.      Capillary Refill: Capillary refill takes less than 2 seconds.      Coloration: Skin is not jaundiced.      Findings: No erythema.   Neurological:      General: No focal deficit present.      Mental Status: She is alert. Mental status is at baseline.      Cranial Nerves: No cranial nerve deficit.   Psychiatric:         Mood and Affect: Mood normal.         Behavior: Behavior normal. Behavior is cooperative.       Significant Labs:  BMP:   Recent Labs   Lab 23  0512   GLU 73      K 3.7      CO2 21*   BUN 33*   CREATININE 3.7*   CALCIUM 9.0   MG 2.0       CBC:   Recent Labs   Lab 23  1025   WBC 12.90*   RBC 3.45*   HGB 9.8*   HCT 31.9*      MCV 93   MCH 28.4   MCHC 30.7*       CMP:   Recent Labs   Lab 23  0509 23  0330 23  0512   GLU 78   < > 73   CALCIUM 8.9   < > 9.0   ALBUMIN 2.4*  --   --       < > 139   K 4.0   < > 3.7   CO2 24   < > 21*      < > 104   BUN 73*   < >  33*   CREATININE 5.7*   < > 3.7*    < > = values in this interval not displayed.       Coagulation:   Recent Labs   Lab 01/26/23  0359   APTT 51.0*       LFTs:   Recent Labs   Lab 01/21/23  0509   ALBUMIN 2.4*       Microbiology Results (last 7 days)       ** No results found for the last 168 hours. **          Specimen (24h ago, onward)      None          Significant Imaging:  I have reviewed all imagining in the last 24 hours.    Assessment/Plan:     * Bacteremia due to Enterococcus  - management per ID and primary  - repeat blood cultures NGTD    Acute deep vein thrombosis (DVT) of non-extremity vein - subclavian  - management per primary team  - transitioned to Eliquis on 1/26    Urinary tract infection due to extended-spectrum beta lactamase (ESBL) producing Escherichia coli  - Infectious Disease consulted and following  - management per primary team    Anemia due to chronic kidney disease  - goal hemoglobin 10-12,   - most recent iron panel with iron 15, transferrin 138, TIBC 204, 7% saturation and ferritin 378  - transfuse for hemoglobin < 7.0  - defer IV iron in light of recent bacteremia, currently receiving EPO 4,000 units SQ with HD    Primary pauci-immune necrotizing and crescentic glomerulonephritis  Microscopic polyangiitis  Acute renal failure on dialysis  WILFREDO (acute kidney injury)  Miscroscopic polyangiitis with renal (biopsy proven pauci immune necrotizing & crescentic glomerulonephritis) and pulmonary involvement s/p Solumedrol 1,000 mg IV x3 doses, PLEX x5 sessions (10/26/2022, 10/27/2022, 10/29/2022, 10/30/2022, 11/01/2022, 11/02/2022, 11/03/2022), Rituximab 375 mg/m^2 x4 (600 mg) on 10/27/2022, 11/03/2922,11/10/2022,11/172022) with cyclophosphamide 7.5 mg/kg on 10/27/2022 and 11/10/2022 (every 14 days). Now iHD for which she receives HD on one but usually twice weekly for metabolic clearance via tunneled HD catheter roughly x2 a week with last HD Friday (01/06/2023).    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  2)  Necrotizing cresentic glomerulonephritis/[id number] necrotizing cresecentic glomerulonephritis.  3)  Focal acute pyelonephritis.  4)  Mild-to-moderate arterionephrosclerosis    Plan/Recommendations:  - no acute indications for HD today  - tentative plans to discharge following removal of right TDC today per IR, may delayed  - can continue Lasix 40 mg PO daily for now  - daily RFPs and magnesium  - currently on prednisone 5 mg daily with atovaquone 1.5 grams faily for PJP prophylaxis   - renal diet if not NPO  - strict I/Os and daily weights  - renally dose all medications to eGFR  - avoid nephrotoxic agents when feasible (i.e. intra-arterial contrast, NSAIDs, supra-therapeutic vancomycin troughs, etc.)    Primary hypertension  - management per primary team    Hypothyroid  - management per primary team    Thank you for your consult. I will follow-up with patient. Please contact us if you have any additional questions.    Pj Paz MD  Nephrology  [facility] ([place])

## 2023-01-27 NOTE — PROCEDURES
Radiology Post-Procedure Note    Pre Op Diagnosis: ESRD  Post Op Diagnosis: Same    Procedure: Right internal jugular vein tunneled CVC removal    Procedure performed by: Dieudonne Lobato MD    Written Informed Consent Obtained: Yes  Specimen Removed: YES Intact dialysis catheter explanted  Estimated Blood Loss: Minimal    Findings:   No evidence of catheter site infection.     Patient tolerated procedure well.    Dieudonne Lobato MD  Interventional Radiology  Department of Radiology

## 2023-01-28 LAB
ANION GAP SERPL CALC-SCNC: 13 MMOL/L (ref 8–16)
APTT BLDCRRT: 60.4 SEC (ref 21–32)
BASOPHILS # BLD AUTO: 0.02 K/UL (ref 0–0.2)
BASOPHILS NFR BLD: 0.2 % (ref 0–1.9)
BUN SERPL-MCNC: 38 MG/DL (ref 8–23)
CALCIUM SERPL-MCNC: 8.7 MG/DL (ref 8.7–10.5)
CHLORIDE SERPL-SCNC: 104 MMOL/L (ref 95–110)
CO2 SERPL-SCNC: 24 MMOL/L (ref 23–29)
CREAT SERPL-MCNC: 4.2 MG/DL (ref 0.5–1.4)
DIFFERENTIAL METHOD: ABNORMAL
EOSINOPHIL # BLD AUTO: 0.1 K/UL (ref 0–0.5)
EOSINOPHIL NFR BLD: 1 % (ref 0–8)
ERYTHROCYTE [DISTWIDTH] IN BLOOD BY AUTOMATED COUNT: 16.1 % (ref 11.5–14.5)
EST. GFR  (NO RACE VARIABLE): 10.5 ML/MIN/1.73 M^2
GLUCOSE SERPL-MCNC: 86 MG/DL (ref 70–110)
HCT VFR BLD AUTO: 27.7 % (ref 37–48.5)
HGB BLD-MCNC: 8.5 G/DL (ref 12–16)
IMM GRANULOCYTES # BLD AUTO: 0.33 K/UL (ref 0–0.04)
IMM GRANULOCYTES NFR BLD AUTO: 2.8 % (ref 0–0.5)
LYMPHOCYTES # BLD AUTO: 2.9 K/UL (ref 1–4.8)
LYMPHOCYTES NFR BLD: 24.4 % (ref 18–48)
MAGNESIUM SERPL-MCNC: 1.9 MG/DL (ref 1.6–2.6)
MCH RBC QN AUTO: 27.2 PG (ref 27–31)
MCHC RBC AUTO-ENTMCNC: 30.7 G/DL (ref 32–36)
MCV RBC AUTO: 89 FL (ref 82–98)
MONOCYTES # BLD AUTO: 1.4 K/UL (ref 0.3–1)
MONOCYTES NFR BLD: 11.8 % (ref 4–15)
NEUTROPHILS # BLD AUTO: 7.1 K/UL (ref 1.8–7.7)
NEUTROPHILS NFR BLD: 59.8 % (ref 38–73)
NRBC BLD-RTO: 0 /100 WBC
PHOSPHATE SERPL-MCNC: 3.6 MG/DL (ref 2.7–4.5)
PLATELET # BLD AUTO: 322 K/UL (ref 150–450)
PMV BLD AUTO: 10.3 FL (ref 9.2–12.9)
POTASSIUM SERPL-SCNC: 3.6 MMOL/L (ref 3.5–5.1)
RBC # BLD AUTO: 3.12 M/UL (ref 4–5.4)
SODIUM SERPL-SCNC: 141 MMOL/L (ref 136–145)
WBC # BLD AUTO: 11.91 K/UL (ref 3.9–12.7)

## 2023-01-28 PROCEDURE — 25000003 PHARM REV CODE 250: Performed by: HOSPITALIST

## 2023-01-28 PROCEDURE — 85025 COMPLETE CBC W/AUTO DIFF WBC: CPT | Performed by: INTERNAL MEDICINE

## 2023-01-28 PROCEDURE — 84100 ASSAY OF PHOSPHORUS: CPT | Performed by: STUDENT IN AN ORGANIZED HEALTH CARE EDUCATION/TRAINING PROGRAM

## 2023-01-28 PROCEDURE — 36415 COLL VENOUS BLD VENIPUNCTURE: CPT | Performed by: STUDENT IN AN ORGANIZED HEALTH CARE EDUCATION/TRAINING PROGRAM

## 2023-01-28 PROCEDURE — 80048 BASIC METABOLIC PNL TOTAL CA: CPT | Performed by: STUDENT IN AN ORGANIZED HEALTH CARE EDUCATION/TRAINING PROGRAM

## 2023-01-28 PROCEDURE — 85730 THROMBOPLASTIN TIME PARTIAL: CPT | Performed by: STUDENT IN AN ORGANIZED HEALTH CARE EDUCATION/TRAINING PROGRAM

## 2023-01-28 PROCEDURE — 25000003 PHARM REV CODE 250: Performed by: INTERNAL MEDICINE

## 2023-01-28 PROCEDURE — 12000002 HC ACUTE/MED SURGE SEMI-PRIVATE ROOM

## 2023-01-28 PROCEDURE — 83735 ASSAY OF MAGNESIUM: CPT | Performed by: STUDENT IN AN ORGANIZED HEALTH CARE EDUCATION/TRAINING PROGRAM

## 2023-01-28 PROCEDURE — 63600175 PHARM REV CODE 636 W HCPCS: Mod: JG | Performed by: INTERNAL MEDICINE

## 2023-01-28 PROCEDURE — 63600175 PHARM REV CODE 636 W HCPCS: Performed by: HOSPITALIST

## 2023-01-28 RX ADMIN — LIDOCAINE 1 PATCH: 50 PATCH CUTANEOUS at 05:01

## 2023-01-28 RX ADMIN — APIXABAN 10 MG: 5 TABLET, FILM COATED ORAL at 08:01

## 2023-01-28 RX ADMIN — FUROSEMIDE 40 MG: 40 TABLET ORAL at 09:01

## 2023-01-28 RX ADMIN — HYDRALAZINE HYDROCHLORIDE 50 MG: 25 TABLET, FILM COATED ORAL at 11:01

## 2023-01-28 RX ADMIN — PANTOPRAZOLE SODIUM 40 MG: 40 TABLET, DELAYED RELEASE ORAL at 05:01

## 2023-01-28 RX ADMIN — ERYTHROPOIETIN 4000 UNITS: 4000 INJECTION, SOLUTION INTRAVENOUS; SUBCUTANEOUS at 05:01

## 2023-01-28 RX ADMIN — PREDNISONE 5 MG: 5 TABLET ORAL at 09:01

## 2023-01-28 RX ADMIN — APIXABAN 10 MG: 5 TABLET, FILM COATED ORAL at 09:01

## 2023-01-28 RX ADMIN — ATOVAQUONE 1500 MG: 750 SUSPENSION ORAL at 09:01

## 2023-01-28 RX ADMIN — MAGNESIUM OXIDE TAB 400 MG (241.3 MG ELEMENTAL MG) 400 MG: 400 (241.3 MG) TAB at 09:01

## 2023-01-28 RX ADMIN — FLUTICASONE PROPIONATE 50 MCG: 50 SPRAY, METERED NASAL at 09:01

## 2023-01-28 RX ADMIN — HYDRALAZINE HYDROCHLORIDE 50 MG: 25 TABLET, FILM COATED ORAL at 05:01

## 2023-01-28 RX ADMIN — CARVEDILOL 12.5 MG: 12.5 TABLET, FILM COATED ORAL at 09:01

## 2023-01-28 RX ADMIN — Medication 1 TABLET: at 09:01

## 2023-01-28 RX ADMIN — WHITE PETROLATUM: 1.75 OINTMENT TOPICAL at 09:01

## 2023-01-28 RX ADMIN — CARVEDILOL 12.5 MG: 12.5 TABLET, FILM COATED ORAL at 08:01

## 2023-01-28 RX ADMIN — QUETIAPINE FUMARATE 12.5 MG: 25 TABLET ORAL at 08:01

## 2023-01-28 RX ADMIN — LEVOTHYROXINE SODIUM 25 MCG: 25 TABLET ORAL at 05:01

## 2023-01-28 RX ADMIN — AMLODIPINE BESYLATE 10 MG: 10 TABLET ORAL at 09:01

## 2023-01-28 NOTE — CONSULTS
Thank you for your consult to [facility]. We have reviewed the patient chart. This patient does not meet criteria for [facility] service at this time due to Patient is unlikely to participate well with the telemedicine platform due to previous transfers off virtual service. and Patient has a condition likely to benefit from in-depth physical exam, or palpation / auscultation, or serial abdominal exams, or   . Will hand back to In-house service.    Haleigh Parks MD

## 2023-01-28 NOTE — PROGRESS NOTES
[facility] - [facility] ([address] Medicine  Progress Note    Patient Name: Kristin Goodman  MRN: [id number]  Patient Class: IP- Inpatient   Admission Date: 1/9/2023  Length of Stay: 19 days  Attending Physician: Paul Clayton MD  Primary Care Provider: Lori Hernandez MD        Subjective:     Principal Problem:Acute renal failure on dialysis        HPI:  77-year-old  woman with HTN, hypothyroidism, HFpEF, and osteoarthritis and new acute renal failure due to new diagnosis of Microscopic Polyangiitis s/p renal biopsy and requiring hemodialysis is transferred from [facility] for concerns of new bacteremia.     She was hospitalized from 10/17/22-12/13/22 then transferred to [facility].  Microscopic polyangiitis with pulmonary and renal involvement had suffered respiratory failure with diffuse alveolar hemorrhage, gi bleeding, and renal replacement therapy. S/p Rheumatology consultation and pulse IV steroids + plasmapheresis with Transfusion medicine started on Rituximab and Cyclophosphamide.  Continues on Steroid taper for immunosuppression.     More recently earlier this week noted to have a urine culture grow ESBL E.coli Cystitis, started on meropenem, then she had blood cultures from 1/5 and 1/7 that have grown Enterococcus (amp sensitive) vancomycin started today, patient had sluggish HD catheter with dialysis.   Also ED report that patient may have had syncope during HD today.     She is transferred for continued infectious workup and management as well as removal of tunneled HD line for line holiday.       Overview/Hospital Course:  Seen by ID and nephrology. Plan for line holiday and IR consulted.      Interval History: No acute events. S/p removal of R TDC 1/27. Still awaiting HD chair.     Review of Systems   Constitutional:  Positive for activity change. Negative for fatigue and fever.   HENT:  Negative for sore throat and trouble swallowing.    Eyes:  Negative for  visual disturbance.   Respiratory:  Negative for cough and shortness of breath.    Cardiovascular:  Negative for chest pain and leg swelling.   Gastrointestinal:  Negative for abdominal distention and abdominal pain.   Genitourinary:  Negative for difficulty urinating.   Musculoskeletal:  Negative for back pain and gait problem.   Skin:  Negative for color change.   Neurological:  Positive for weakness. Negative for dizziness, light-headedness and headaches.   Psychiatric/Behavioral:  Negative for agitation and confusion.    Objective:     Vital Signs (Most Recent):  Temp: 98.6 °F (37 °C) (01/28/23 1133)  Pulse: 70 (01/28/23 1547)  Resp: 19 (01/28/23 1133)  BP: (!) 99/52 (01/28/23 1133)  SpO2: 97 % (01/28/23 1133)   Vital Signs (24h Range):  Temp:  [98.1 °F (36.7 °C)-98.8 °F (37.1 °C)] 98.6 °F (37 °C)  Pulse:  [69-90] 70  Resp:  [16-19] 19  SpO2:  [93 %-100 %] 97 %  BP: ()/(52-81) 99/52     Weight: 63.3 kg (139 lb 8.8 oz)  Body mass index is 26.37 kg/m².    Intake/Output Summary (Last 24 hours) at 1/28/2023 1615  Last data filed at 1/28/2023 1350  Gross per 24 hour   Intake 360 ml   Output 700 ml   Net -340 ml        Physical Exam  Vitals reviewed.   Constitutional:       General: She is not in acute distress.     Appearance: Normal appearance. She is not toxic-appearing.   HENT:      Head: Normocephalic and atraumatic.   Eyes:      General: No scleral icterus.  Cardiovascular:      Rate and Rhythm: Normal rate and regular rhythm.      Pulses: Normal pulses.      Heart sounds: No murmur heard.  Pulmonary:      Effort: Pulmonary effort is normal. No respiratory distress.      Breath sounds: No wheezing, rhonchi or rales.   Chest:      Comments: L TDC  Abdominal:      General: Bowel sounds are normal. There is no distension.      Tenderness: There is no abdominal tenderness. There is no guarding.   Musculoskeletal:      Right lower leg: No edema.      Left lower leg: No edema.   Skin:     General: Skin is warm.    Neurological:      General: No focal deficit present.      Mental Status: She is alert and oriented to person, place, and time. Mental status is at baseline.   Psychiatric:         Behavior: Behavior normal.         Thought Content: Thought content normal.             Assessment/Plan:      * Acute renal failure on dialysis  -due to Microscopic Polyangiitis  - IR consulted. S/p permcath placement  on 1/17/23   - US of UE showed DVTs in R and L UE.   - CXR:The dialysis catheter remains in position  - S/p R TDC replacement on 01/23 by IR.  On 01/24, TDC was not working again for hemodialysis.  Consulted IR again.  S/p Left TDC placement on 1/25.   - Nephrology following. Patient will need HD chair set up prior to d/c.  -Removal of R TDC today  -Discharge planning. Awaiting HD chair    WILFREDO (acute kidney injury)        Acute deep vein thrombosis (DVT) of non-extremity vein - subclavian  Heparin drip started 1/19 pending procedures to re-establish HD access.  Switch to DOAC    Pauci-immune vasculitis  Continue steroid taper    Bacteremia due to Enterococcus  Positive blood cxs 1/5, 1/7, 1/9. Received 1 gm IV vancomycin at LTAC on 1/10.   HD tunelled cath removed 1/10.  Also has midline from LTAC - removed. Surface echo - no vegetation. ID consulted. Last surveillance bcxs 1/11/23 NGTD  Per ID - 2-weeks vanc from negative bcxs/line removal - end date 1/24. Completed.    Urinary tract infection due to extended-spectrum beta lactamase (ESBL) producing Escherichia coli  Urine culture from 1/05 with ESBL E.coli   -Ertapenem renal dosing 500mg IV q24 ordered, completed 5 days (1/5-1/10). Missed dose on 1/9 due to transfer to hospital    Anemia due to chronic kidney disease  Has required transfusions during recent inpatient/LTAC stays   -was receiving EPO infusion at nephrology direction.   Hb 6.7 on 01/16.  Ordered a unit of blood.  Consent obtained.  Continue to monitor CBC.  Goal for transfusion hemoglobin less than  7.  Stable    Primary pauci-immune necrotizing and crescentic glomerulonephritis  Patient with acute kidney injury likely due to microscopic polyangiits with granulomatosis (MPA) WILFREDO is currently stable. Labs reviewed- Renal function/electrolytes with Estimated Creatinine Clearance: 9.9 mL/min (A) (based on SCr of 4.2 mg/dL (H)). according to latest data. Monitor urine output and serial BMP and adjust therapy as needed. Avoid nephrotoxins and renally dose meds for GFR listed above.  Had complex course with DAH, requiring pulse dose steroids, plasmapheresis and then rituximab and cyclophosphamide  -continue prednisone taper  -continue atovaquone for PJP ppx  -will need outpatient Rheum follow up    Gastric reflux  Continue BID PPI    Dysphagia  Continue renal diet   SLP following     Microscopic polyangiitis  -continue steroid taper   -patient is on OI prophylaxis with atovaquone 1500mg po daily  -continued on Protonix 40mg po bid while on glucocorticoids.     Impaired mobility  OT/PT     Chronic diastolic heart failure  Has preserved EF   -HD was primary volume maintenance   -continue Lasix 40mg po daily - consider higher or more frequent doses are required during her LIne holiday     Syncope  Report of possible syncope with HD,  Dizzy spells noted per LTAC notes - pt s/p MRI brain on 12/8 w/o acute findings   -neurology prior eval has recommended PT/OT for vestibular therapy  -Future outpatient cardiology visit for possible tilt-table eval.   -refer to neurology at discharge for BPPV follow up    Primary hypertension  Continue carvedilol  Add amlodipine  -home lisinopril is on hold due to her WILFREDO    Hypothyroid  Continue levothyroxine 25mcg       VTE Risk Mitigation (From admission, onward)         Ordered     apixaban tablet 10 mg  2 times daily         01/26/23 1244     heparin (porcine) injection 1,000 Units  As needed (PRN)         01/25/23 1128     heparin (porcine) injection 1,000 Units  As needed (PRN)          01/23/23 1321     heparin (porcine) injection 1,000 Units  As needed (PRN)         01/17/23 1421     heparin (porcine) injection 1,000 Units  As needed (PRN)         01/09/23 2330     Place sequential compression device  Until discontinued         01/09/23 2330                Discharge Planning   ANTHONY: 1/30/2023     Code Status: Full Code   Is the patient medically ready for discharge?: No    Reason for patient still in hospital (select all that apply): Pending disposition  Discharge Plan A: Home Health   Discharge Delays: None known at this time              Paul Clayton MD  Department of Hospital Medicine   [facility] - Intensive Care ([place]-16)

## 2023-01-28 NOTE — PLAN OF CARE
Problem: Adult Inpatient Plan of Care  Goal: Plan of Care Review  Outcome: Ongoing, Progressing  Goal: Patient-Specific Goal (Individualized)  Outcome: Ongoing, Progressing   Pt AAO X 4; able to express needs.  No c/o pain.  No bleeding .  New Catheter for Dialysis on M W F.    Safety maintained.  Bed in low position,  call  light in reach.

## 2023-01-28 NOTE — ASSESSMENT & PLAN NOTE
Patient with acute kidney injury likely due to microscopic polyangiits with granulomatosis (MPA) WILFREDO is currently stable. Labs reviewed- Renal function/electrolytes with Estimated Creatinine Clearance: 9.9 mL/min (A) (based on SCr of 4.2 mg/dL (H)). according to latest data. Monitor urine output and serial BMP and adjust therapy as needed. Avoid nephrotoxins and renally dose meds for GFR listed above.  Had complex course with DAH, requiring pulse dose steroids, plasmapheresis and then rituximab and cyclophosphamide  -continue prednisone taper  -continue atovaquone for PJP ppx  -will need outpatient Rheum follow up

## 2023-01-28 NOTE — SUBJECTIVE & OBJECTIVE
Interval History: No acute events. S/p removal of R TDC 1/27. Still awaiting HD chair.     Review of Systems   Constitutional:  Positive for activity change. Negative for fatigue and fever.   HENT:  Negative for sore throat and trouble swallowing.    Eyes:  Negative for visual disturbance.   Respiratory:  Negative for cough and shortness of breath.    Cardiovascular:  Negative for chest pain and leg swelling.   Gastrointestinal:  Negative for abdominal distention and abdominal pain.   Genitourinary:  Negative for difficulty urinating.   Musculoskeletal:  Negative for back pain and gait problem.   Skin:  Negative for color change.   Neurological:  Positive for weakness. Negative for dizziness, light-headedness and headaches.   Psychiatric/Behavioral:  Negative for agitation and confusion.    Objective:     Vital Signs (Most Recent):  Temp: 98.6 °F (37 °C) (01/28/23 1133)  Pulse: 70 (01/28/23 1547)  Resp: 19 (01/28/23 1133)  BP: (!) 99/52 (01/28/23 1133)  SpO2: 97 % (01/28/23 1133)   Vital Signs (24h Range):  Temp:  [98.1 °F (36.7 °C)-98.8 °F (37.1 °C)] 98.6 °F (37 °C)  Pulse:  [69-90] 70  Resp:  [16-19] 19  SpO2:  [93 %-100 %] 97 %  BP: ()/(52-81) 99/52     Weight: 63.3 kg (139 lb 8.8 oz)  Body mass index is 26.37 kg/m².    Intake/Output Summary (Last 24 hours) at 1/28/2023 1615  Last data filed at 1/28/2023 1350  Gross per 24 hour   Intake 360 ml   Output 700 ml   Net -340 ml        Physical Exam  Vitals reviewed.   Constitutional:       General: She is not in acute distress.     Appearance: Normal appearance. She is not toxic-appearing.   HENT:      Head: Normocephalic and atraumatic.   Eyes:      General: No scleral icterus.  Cardiovascular:      Rate and Rhythm: Normal rate and regular rhythm.      Pulses: Normal pulses.      Heart sounds: No murmur heard.  Pulmonary:      Effort: Pulmonary effort is normal. No respiratory distress.      Breath sounds: No wheezing, rhonchi or rales.   Chest:      Comments:  L TDC  Abdominal:      General: Bowel sounds are normal. There is no distension.      Tenderness: There is no abdominal tenderness. There is no guarding.   Musculoskeletal:      Right lower leg: No edema.      Left lower leg: No edema.   Skin:     General: Skin is warm.   Neurological:      General: No focal deficit present.      Mental Status: She is alert and oriented to person, place, and time. Mental status is at baseline.   Psychiatric:         Behavior: Behavior normal.         Thought Content: Thought content normal.

## 2023-01-28 NOTE — ASSESSMENT & PLAN NOTE
Has required transfusions during recent inpatient/LTAC stays   -was receiving EPO infusion at nephrology direction.   Hb 6.7 on 01/16.  Ordered a unit of blood.  Consent obtained.  Continue to monitor CBC.  Goal for transfusion hemoglobin less than 7.  Stable

## 2023-01-29 LAB
APTT BLDCRRT: 62.1 SEC (ref 21–32)
BASOPHILS # BLD AUTO: 0.02 K/UL (ref 0–0.2)
BASOPHILS NFR BLD: 0.2 % (ref 0–1.9)
DIFFERENTIAL METHOD: ABNORMAL
EOSINOPHIL # BLD AUTO: 0.1 K/UL (ref 0–0.5)
EOSINOPHIL NFR BLD: 1 % (ref 0–8)
ERYTHROCYTE [DISTWIDTH] IN BLOOD BY AUTOMATED COUNT: 16.2 % (ref 11.5–14.5)
HCT VFR BLD AUTO: 25.9 % (ref 37–48.5)
HGB BLD-MCNC: 8 G/DL (ref 12–16)
IMM GRANULOCYTES # BLD AUTO: 0.25 K/UL (ref 0–0.04)
IMM GRANULOCYTES NFR BLD AUTO: 2.2 % (ref 0–0.5)
LYMPHOCYTES # BLD AUTO: 3.3 K/UL (ref 1–4.8)
LYMPHOCYTES NFR BLD: 29.3 % (ref 18–48)
MCH RBC QN AUTO: 27.5 PG (ref 27–31)
MCHC RBC AUTO-ENTMCNC: 30.9 G/DL (ref 32–36)
MCV RBC AUTO: 89 FL (ref 82–98)
MONOCYTES # BLD AUTO: 1.5 K/UL (ref 0.3–1)
MONOCYTES NFR BLD: 13 % (ref 4–15)
NEUTROPHILS # BLD AUTO: 6.1 K/UL (ref 1.8–7.7)
NEUTROPHILS NFR BLD: 54.3 % (ref 38–73)
NRBC BLD-RTO: 0 /100 WBC
PLATELET # BLD AUTO: 335 K/UL (ref 150–450)
PMV BLD AUTO: 10.1 FL (ref 9.2–12.9)
RBC # BLD AUTO: 2.91 M/UL (ref 4–5.4)
WBC # BLD AUTO: 11.27 K/UL (ref 3.9–12.7)

## 2023-01-29 PROCEDURE — 85025 COMPLETE CBC W/AUTO DIFF WBC: CPT | Performed by: INTERNAL MEDICINE

## 2023-01-29 PROCEDURE — 25000242 PHARM REV CODE 250 ALT 637 W/ HCPCS: Performed by: STUDENT IN AN ORGANIZED HEALTH CARE EDUCATION/TRAINING PROGRAM

## 2023-01-29 PROCEDURE — 63600175 PHARM REV CODE 636 W HCPCS: Performed by: HOSPITALIST

## 2023-01-29 PROCEDURE — 25000003 PHARM REV CODE 250: Performed by: INTERNAL MEDICINE

## 2023-01-29 PROCEDURE — 25000003 PHARM REV CODE 250: Performed by: HOSPITALIST

## 2023-01-29 PROCEDURE — 36415 COLL VENOUS BLD VENIPUNCTURE: CPT | Performed by: STUDENT IN AN ORGANIZED HEALTH CARE EDUCATION/TRAINING PROGRAM

## 2023-01-29 PROCEDURE — 85730 THROMBOPLASTIN TIME PARTIAL: CPT | Performed by: STUDENT IN AN ORGANIZED HEALTH CARE EDUCATION/TRAINING PROGRAM

## 2023-01-29 PROCEDURE — 12000002 HC ACUTE/MED SURGE SEMI-PRIVATE ROOM

## 2023-01-29 RX ADMIN — PANTOPRAZOLE SODIUM 40 MG: 40 TABLET, DELAYED RELEASE ORAL at 06:01

## 2023-01-29 RX ADMIN — LEVOTHYROXINE SODIUM 25 MCG: 25 TABLET ORAL at 05:01

## 2023-01-29 RX ADMIN — FLUTICASONE PROPIONATE 50 MCG: 50 SPRAY, METERED NASAL at 08:01

## 2023-01-29 RX ADMIN — LIDOCAINE 1 PATCH: 50 PATCH CUTANEOUS at 05:01

## 2023-01-29 RX ADMIN — FLUTICASONE PROPIONATE 50 MCG: 50 SPRAY, METERED NASAL at 10:01

## 2023-01-29 RX ADMIN — HYDRALAZINE HYDROCHLORIDE 50 MG: 25 TABLET, FILM COATED ORAL at 02:01

## 2023-01-29 RX ADMIN — Medication 1 TABLET: at 10:01

## 2023-01-29 RX ADMIN — FUROSEMIDE 40 MG: 40 TABLET ORAL at 10:01

## 2023-01-29 RX ADMIN — WHITE PETROLATUM: 1.75 OINTMENT TOPICAL at 08:01

## 2023-01-29 RX ADMIN — QUETIAPINE FUMARATE 12.5 MG: 25 TABLET ORAL at 08:01

## 2023-01-29 RX ADMIN — HYDRALAZINE HYDROCHLORIDE 50 MG: 25 TABLET, FILM COATED ORAL at 05:01

## 2023-01-29 RX ADMIN — APIXABAN 10 MG: 5 TABLET, FILM COATED ORAL at 08:01

## 2023-01-29 RX ADMIN — PREDNISONE 5 MG: 5 TABLET ORAL at 10:01

## 2023-01-29 RX ADMIN — ATOVAQUONE 1500 MG: 750 SUSPENSION ORAL at 10:01

## 2023-01-29 RX ADMIN — MAGNESIUM OXIDE TAB 400 MG (241.3 MG ELEMENTAL MG) 400 MG: 400 (241.3 MG) TAB at 10:01

## 2023-01-29 RX ADMIN — CARVEDILOL 12.5 MG: 12.5 TABLET, FILM COATED ORAL at 10:01

## 2023-01-29 RX ADMIN — APIXABAN 10 MG: 5 TABLET, FILM COATED ORAL at 10:01

## 2023-01-29 RX ADMIN — AMLODIPINE BESYLATE 10 MG: 10 TABLET ORAL at 10:01

## 2023-01-29 RX ADMIN — CARVEDILOL 12.5 MG: 12.5 TABLET, FILM COATED ORAL at 08:01

## 2023-01-29 RX ADMIN — HYDRALAZINE HYDROCHLORIDE 50 MG: 25 TABLET, FILM COATED ORAL at 08:01

## 2023-01-29 NOTE — ASSESSMENT & PLAN NOTE
Patient with acute kidney injury likely due to microscopic polyangiits with granulomatosis (MPA) WILFREDO is currently stable. Labs reviewed- Renal function/electrolytes with Estimated Creatinine Clearance: 9.6 mL/min (A) (based on SCr of 4.2 mg/dL (H)). according to latest data. Monitor urine output and serial BMP and adjust therapy as needed. Avoid nephrotoxins and renally dose meds for GFR listed above.  Had complex course with DAH, requiring pulse dose steroids, plasmapheresis and then rituximab and cyclophosphamide  -continue prednisone taper  -continue atovaquone for PJP ppx  -will need outpatient Rheum follow up

## 2023-01-29 NOTE — PLAN OF CARE
Problem: Adult Inpatient Plan of Care  Goal: Plan of Care Review  Outcome: Ongoing, Progressing  Goal: Patient-Specific Goal (Individualized)  Outcome: Ongoing, Progressing  Pt AAO X 4; able to express needs.  NO c/o pain.  New catheter for Dialysis.  M-W-F schedule.  Possible discharge home today/ Safety maintained.  Bed in low position,  call  light in reach.

## 2023-01-29 NOTE — SUBJECTIVE & OBJECTIVE
Interval History: No acute events. Pt resting comfortably in bed. Still awaiting HD chair.     Review of Systems   Constitutional:  Positive for activity change. Negative for fatigue and fever.   HENT:  Negative for sore throat and trouble swallowing.    Eyes:  Negative for visual disturbance.   Respiratory:  Negative for cough and shortness of breath.    Cardiovascular:  Negative for chest pain and leg swelling.   Gastrointestinal:  Negative for abdominal distention and abdominal pain.   Genitourinary:  Negative for difficulty urinating.   Musculoskeletal:  Negative for back pain and gait problem.   Skin:  Negative for color change.   Neurological:  Positive for weakness. Negative for dizziness, light-headedness and headaches.   Psychiatric/Behavioral:  Negative for agitation and confusion.    Objective:     Vital Signs (Most Recent):  Temp: 98 °F (36.7 °C) (01/29/23 1546)  Pulse: 88 (01/29/23 1546)  Resp: 18 (01/29/23 1546)  BP: (!) 141/60 (01/29/23 1546)  SpO2: (!) 94 % (01/29/23 1546)   Vital Signs (24h Range):  Temp:  [98 °F (36.7 °C)-98.7 °F (37.1 °C)] 98 °F (36.7 °C)  Pulse:  [68-90] 88  Resp:  [16-21] 18  SpO2:  [92 %-98 %] 94 %  BP: (111-141)/(55-70) 141/60     Weight: 59.6 kg (131 lb 6.3 oz)  Body mass index is 24.83 kg/m².    Intake/Output Summary (Last 24 hours) at 1/29/2023 1551  Last data filed at 1/28/2023 2000  Gross per 24 hour   Intake 150 ml   Output --   Net 150 ml        Physical Exam  Vitals reviewed.   Constitutional:       General: She is not in acute distress.     Appearance: Normal appearance. She is not toxic-appearing.   HENT:      Head: Normocephalic and atraumatic.   Eyes:      General: No scleral icterus.  Cardiovascular:      Rate and Rhythm: Normal rate and regular rhythm.      Pulses: Normal pulses.      Heart sounds: No murmur heard.  Pulmonary:      Effort: Pulmonary effort is normal. No respiratory distress.      Breath sounds: No wheezing, rhonchi or rales.   Chest:       Comments: L TDC  Abdominal:      General: Bowel sounds are normal. There is no distension.      Tenderness: There is no abdominal tenderness. There is no guarding.   Musculoskeletal:      Right lower leg: No edema.      Left lower leg: No edema.   Skin:     General: Skin is warm.   Neurological:      General: No focal deficit present.      Mental Status: She is alert and oriented to person, place, and time. Mental status is at baseline.   Psychiatric:         Behavior: Behavior normal.         Thought Content: Thought content normal.

## 2023-01-29 NOTE — ASSESSMENT & PLAN NOTE
-due to Microscopic Polyangiitis  - IR consulted. S/p permcath placement  on 1/17/23   - US of UE showed DVTs in R and L UE.   - CXR:The dialysis catheter remains in position  - S/p R TDC replacement on 01/23 by IR.  On 01/24, TDC was not working again for hemodialysis.  Consulted IR again.  S/p Left TDC placement on 1/25.   - Nephrology following. Patient will need HD chair set up prior to d/c.  -s/p Removal of R TDC  -Discharge planning. Awaiting HD chair

## 2023-01-29 NOTE — PROGRESS NOTES
[facility] ([address] Medicine  Progress Note    Patient Name: Kristin Goodman  MRN: [id number]  Patient Class: IP- Inpatient   Admission Date: 1/9/2023  Length of Stay: 20 days  Attending Physician: Paul Clayton MD  Primary Care Provider: Lori Hernandez MD        Subjective:     Principal Problem:Acute renal failure on dialysis        HPI:  77-year-old  woman with HTN, hypothyroidism, HFpEF, and osteoarthritis and new acute renal failure due to new diagnosis of Microscopic Polyangiitis s/p renal biopsy and requiring hemodialysis is transferred from [facility] for concerns of new bacteremia.     She was hospitalized from 10/17/22-12/13/22 then transferred to [facility].  Microscopic polyangiitis with pulmonary and renal involvement had suffered respiratory failure with diffuse alveolar hemorrhage, gi bleeding, and renal replacement therapy. S/p Rheumatology consultation and pulse IV steroids + plasmapheresis with Transfusion medicine started on Rituximab and Cyclophosphamide.  Continues on Steroid taper for immunosuppression.     More recently earlier this week noted to have a urine culture grow ESBL E.coli Cystitis, started on meropenem, then she had blood cultures from 1/5 and 1/7 that have grown Enterococcus (amp sensitive) vancomycin started today, patient had sluggish HD catheter with dialysis.   Also ED report that patient may have had syncope during HD today.     She is transferred for continued infectious workup and management as well as removal of tunneled HD line for line holiday.       Overview/Hospital Course:  Seen by ID and nephrology. Plan for line holiday and IR consulted.      Interval History: No acute events. Pt resting comfortably in bed. Still awaiting HD chair.     Review of Systems   Constitutional:  Positive for activity change. Negative for fatigue and fever.   HENT:  Negative for sore throat and trouble swallowing.    Eyes:  Negative for  visual disturbance.   Respiratory:  Negative for cough and shortness of breath.    Cardiovascular:  Negative for chest pain and leg swelling.   Gastrointestinal:  Negative for abdominal distention and abdominal pain.   Genitourinary:  Negative for difficulty urinating.   Musculoskeletal:  Negative for back pain and gait problem.   Skin:  Negative for color change.   Neurological:  Positive for weakness. Negative for dizziness, light-headedness and headaches.   Psychiatric/Behavioral:  Negative for agitation and confusion.    Objective:     Vital Signs (Most Recent):  Temp: 98 °F (36.7 °C) (01/29/23 1546)  Pulse: 88 (01/29/23 1546)  Resp: 18 (01/29/23 1546)  BP: (!) 141/60 (01/29/23 1546)  SpO2: (!) 94 % (01/29/23 1546)   Vital Signs (24h Range):  Temp:  [98 °F (36.7 °C)-98.7 °F (37.1 °C)] 98 °F (36.7 °C)  Pulse:  [68-90] 88  Resp:  [16-21] 18  SpO2:  [92 %-98 %] 94 %  BP: (111-141)/(55-70) 141/60     Weight: 59.6 kg (131 lb 6.3 oz)  Body mass index is 24.83 kg/m².    Intake/Output Summary (Last 24 hours) at 1/29/2023 1551  Last data filed at 1/28/2023 2000  Gross per 24 hour   Intake 150 ml   Output --   Net 150 ml        Physical Exam  Vitals reviewed.   Constitutional:       General: She is not in acute distress.     Appearance: Normal appearance. She is not toxic-appearing.   HENT:      Head: Normocephalic and atraumatic.   Eyes:      General: No scleral icterus.  Cardiovascular:      Rate and Rhythm: Normal rate and regular rhythm.      Pulses: Normal pulses.      Heart sounds: No murmur heard.  Pulmonary:      Effort: Pulmonary effort is normal. No respiratory distress.      Breath sounds: No wheezing, rhonchi or rales.   Chest:      Comments: L TDC  Abdominal:      General: Bowel sounds are normal. There is no distension.      Tenderness: There is no abdominal tenderness. There is no guarding.   Musculoskeletal:      Right lower leg: No edema.      Left lower leg: No edema.   Skin:     General: Skin is warm.    Neurological:      General: No focal deficit present.      Mental Status: She is alert and oriented to person, place, and time. Mental status is at baseline.   Psychiatric:         Behavior: Behavior normal.         Thought Content: Thought content normal.             Assessment/Plan:      * Acute renal failure on dialysis  -due to Microscopic Polyangiitis  - IR consulted. S/p permcath placement  on 1/17/23   - US of UE showed DVTs in R and L UE.   - CXR:The dialysis catheter remains in position  - S/p R TDC replacement on 01/23 by IR.  On 01/24, TDC was not working again for hemodialysis.  Consulted IR again.  S/p Left TDC placement on 1/25.   - Nephrology following. Patient will need HD chair set up prior to d/c.  -s/p Removal of R TDC  -Discharge planning. Awaiting HD chair    WILFREDO (acute kidney injury)        Acute deep vein thrombosis (DVT) of non-extremity vein - subclavian  Heparin drip started 1/19 pending procedures to re-establish HD access.  Switched to DOAC    Pauci-immune vasculitis  Continue steroid taper    Bacteremia due to Enterococcus  Positive blood cxs 1/5, 1/7, 1/9. Received 1 gm IV vancomycin at LTAC on 1/10.   HD tunelled cath removed 1/10.  Also has midline from LTAC - removed. Surface echo - no vegetation. ID consulted. Last surveillance bcxs 1/11/23 NGTD  Per ID - 2-weeks vanc from negative bcxs/line removal - end date 1/24. Completed.    Urinary tract infection due to extended-spectrum beta lactamase (ESBL) producing Escherichia coli  Urine culture from 1/05 with ESBL E.coli   -Ertapenem renal dosing 500mg IV q24 ordered, completed 5 days (1/5-1/10). Missed dose on 1/9 due to transfer to hospital    Anemia due to chronic kidney disease  Has required transfusions during recent inpatient/LTAC stays   -was receiving EPO infusion at nephrology direction.   Hb 6.7 on 01/16.  Ordered a unit of blood.  Consent obtained.  Continue to monitor CBC.  Goal for transfusion hemoglobin less than  7.  Stable    Primary pauci-immune necrotizing and crescentic glomerulonephritis  Patient with acute kidney injury likely due to microscopic polyangiits with granulomatosis (MPA) WILFREDO is currently stable. Labs reviewed- Renal function/electrolytes with Estimated Creatinine Clearance: 9.6 mL/min (A) (based on SCr of 4.2 mg/dL (H)). according to latest data. Monitor urine output and serial BMP and adjust therapy as needed. Avoid nephrotoxins and renally dose meds for GFR listed above.  Had complex course with DAH, requiring pulse dose steroids, plasmapheresis and then rituximab and cyclophosphamide  -continue prednisone taper  -continue atovaquone for PJP ppx  -will need outpatient Rheum follow up    Gastric reflux  Continue BID PPI    Dysphagia  Continue renal diet   SLP following     Microscopic polyangiitis  -continue steroid taper   -patient is on OI prophylaxis with atovaquone 1500mg po daily  -continued on Protonix 40mg po bid while on glucocorticoids.     Impaired mobility  OT/PT     Chronic diastolic heart failure  Has preserved EF   -HD was primary volume maintenance   -continue Lasix 40mg po daily - consider higher or more frequent doses are required during her LIne holiday     Syncope  Report of possible syncope with HD,  Dizzy spells noted per LTAC notes - pt s/p MRI brain on 12/8 w/o acute findings   -neurology prior eval has recommended PT/OT for vestibular therapy  -Future outpatient cardiology visit for possible tilt-table eval.   -refer to neurology at discharge for BPPV follow up    Primary hypertension  Continue carvedilol  Add amlodipine  -home lisinopril is on hold due to her WILFREDO    Hypothyroid  Continue levothyroxine 25mcg       VTE Risk Mitigation (From admission, onward)         Ordered     apixaban tablet 10 mg  2 times daily         01/26/23 1244     heparin (porcine) injection 1,000 Units  As needed (PRN)         01/25/23 1128     heparin (porcine) injection 1,000 Units  As needed (PRN)          01/23/23 1321     heparin (porcine) injection 1,000 Units  As needed (PRN)         01/17/23 1421     heparin (porcine) injection 1,000 Units  As needed (PRN)         01/09/23 2330     Place sequential compression device  Until discontinued         01/09/23 2330                Discharge Planning   ANTHONY: 1/30/2023     Code Status: Full Code   Is the patient medically ready for discharge?: No    Reason for patient still in hospital (select all that apply): Pending disposition  Discharge Plan A: Home Health   Discharge Delays: None known at this time              Paul Clayton MD  Department of Hospital Medicine   [facility] - Intensive Care ([place])

## 2023-01-30 LAB
ALBUMIN SERPL BCP-MCNC: 2.3 G/DL (ref 3.5–5.2)
ANION GAP SERPL CALC-SCNC: 15 MMOL/L (ref 8–16)
BASOPHILS # BLD AUTO: 0.02 K/UL (ref 0–0.2)
BASOPHILS NFR BLD: 0.2 % (ref 0–1.9)
BUN SERPL-MCNC: 52 MG/DL (ref 8–23)
CALCIUM SERPL-MCNC: 8.3 MG/DL (ref 8.7–10.5)
CHLORIDE SERPL-SCNC: 103 MMOL/L (ref 95–110)
CO2 SERPL-SCNC: 23 MMOL/L (ref 23–29)
CREAT SERPL-MCNC: 5.3 MG/DL (ref 0.5–1.4)
DIFFERENTIAL METHOD: ABNORMAL
EOSINOPHIL # BLD AUTO: 0.1 K/UL (ref 0–0.5)
EOSINOPHIL NFR BLD: 0.6 % (ref 0–8)
ERYTHROCYTE [DISTWIDTH] IN BLOOD BY AUTOMATED COUNT: 16 % (ref 11.5–14.5)
EST. GFR  (NO RACE VARIABLE): 7.9 ML/MIN/1.73 M^2
FERRITIN SERPL-MCNC: 994 NG/ML (ref 20–300)
FOLATE SERPL-MCNC: 9.8 NG/ML (ref 4–24)
GLUCOSE SERPL-MCNC: 127 MG/DL (ref 70–110)
HCT VFR BLD AUTO: 24.3 % (ref 37–48.5)
HGB BLD-MCNC: 7.7 G/DL (ref 12–16)
HGB BLD-MCNC: 7.7 G/DL (ref 12–16)
IMM GRANULOCYTES # BLD AUTO: 0.31 K/UL (ref 0–0.04)
IMM GRANULOCYTES NFR BLD AUTO: 2.5 % (ref 0–0.5)
IRON SERPL-MCNC: 20 UG/DL (ref 30–160)
LYMPHOCYTES # BLD AUTO: 3.6 K/UL (ref 1–4.8)
LYMPHOCYTES NFR BLD: 29.1 % (ref 18–48)
MCH RBC QN AUTO: 27.5 PG (ref 27–31)
MCHC RBC AUTO-ENTMCNC: 31.7 G/DL (ref 32–36)
MCV RBC AUTO: 87 FL (ref 82–98)
MONOCYTES # BLD AUTO: 1.8 K/UL (ref 0.3–1)
MONOCYTES NFR BLD: 14.5 % (ref 4–15)
NEUTROPHILS # BLD AUTO: 6.5 K/UL (ref 1.8–7.7)
NEUTROPHILS NFR BLD: 53.1 % (ref 38–73)
NRBC BLD-RTO: 0 /100 WBC
PHOSPHATE SERPL-MCNC: 3.4 MG/DL (ref 2.7–4.5)
PLATELET # BLD AUTO: 353 K/UL (ref 150–450)
PMV BLD AUTO: 10.1 FL (ref 9.2–12.9)
POTASSIUM SERPL-SCNC: 3.8 MMOL/L (ref 3.5–5.1)
RBC # BLD AUTO: 2.8 M/UL (ref 4–5.4)
SATURATED IRON: 13 % (ref 20–50)
SODIUM SERPL-SCNC: 141 MMOL/L (ref 136–145)
TOTAL IRON BINDING CAPACITY: 152 UG/DL (ref 250–450)
TRANSFERRIN SERPL-MCNC: 103 MG/DL (ref 200–375)
VIT B12 SERPL-MCNC: >2000 PG/ML (ref 210–950)
WBC # BLD AUTO: 12.24 K/UL (ref 3.9–12.7)

## 2023-01-30 PROCEDURE — 84466 ASSAY OF TRANSFERRIN: CPT | Performed by: INTERNAL MEDICINE

## 2023-01-30 PROCEDURE — 25000003 PHARM REV CODE 250: Performed by: HOSPITALIST

## 2023-01-30 PROCEDURE — 25000003 PHARM REV CODE 250: Performed by: INTERNAL MEDICINE

## 2023-01-30 PROCEDURE — 82728 ASSAY OF FERRITIN: CPT | Performed by: INTERNAL MEDICINE

## 2023-01-30 PROCEDURE — 82607 VITAMIN B-12: CPT | Performed by: INTERNAL MEDICINE

## 2023-01-30 PROCEDURE — 12000002 HC ACUTE/MED SURGE SEMI-PRIVATE ROOM

## 2023-01-30 PROCEDURE — 85025 COMPLETE CBC W/AUTO DIFF WBC: CPT | Performed by: INTERNAL MEDICINE

## 2023-01-30 PROCEDURE — 36415 COLL VENOUS BLD VENIPUNCTURE: CPT | Performed by: INTERNAL MEDICINE

## 2023-01-30 PROCEDURE — 99232 SBSQ HOSP IP/OBS MODERATE 35: CPT | Mod: ,,, | Performed by: HOSPITALIST

## 2023-01-30 PROCEDURE — 85018 HEMOGLOBIN: CPT | Performed by: INTERNAL MEDICINE

## 2023-01-30 PROCEDURE — 63600175 PHARM REV CODE 636 W HCPCS: Performed by: HOSPITALIST

## 2023-01-30 PROCEDURE — 99232 PR SUBSEQUENT HOSPITAL CARE,LEVL II: ICD-10-PCS | Mod: ,,, | Performed by: HOSPITALIST

## 2023-01-30 PROCEDURE — 80069 RENAL FUNCTION PANEL: CPT | Performed by: STUDENT IN AN ORGANIZED HEALTH CARE EDUCATION/TRAINING PROGRAM

## 2023-01-30 PROCEDURE — 82746 ASSAY OF FOLIC ACID SERUM: CPT | Performed by: INTERNAL MEDICINE

## 2023-01-30 RX ADMIN — WHITE PETROLATUM: 1.75 OINTMENT TOPICAL at 09:01

## 2023-01-30 RX ADMIN — PANTOPRAZOLE SODIUM 40 MG: 40 TABLET, DELAYED RELEASE ORAL at 05:01

## 2023-01-30 RX ADMIN — Medication 1 TABLET: at 09:01

## 2023-01-30 RX ADMIN — HYDRALAZINE HYDROCHLORIDE 50 MG: 25 TABLET, FILM COATED ORAL at 05:01

## 2023-01-30 RX ADMIN — FLUTICASONE PROPIONATE 50 MCG: 50 SPRAY, METERED NASAL at 09:01

## 2023-01-30 RX ADMIN — APIXABAN 10 MG: 5 TABLET, FILM COATED ORAL at 09:01

## 2023-01-30 RX ADMIN — FLUTICASONE PROPIONATE 50 MCG: 50 SPRAY, METERED NASAL at 08:01

## 2023-01-30 RX ADMIN — HYDRALAZINE HYDROCHLORIDE 50 MG: 25 TABLET, FILM COATED ORAL at 09:01

## 2023-01-30 RX ADMIN — CARVEDILOL 12.5 MG: 12.5 TABLET, FILM COATED ORAL at 09:01

## 2023-01-30 RX ADMIN — CARVEDILOL 12.5 MG: 12.5 TABLET, FILM COATED ORAL at 08:01

## 2023-01-30 RX ADMIN — HYDRALAZINE HYDROCHLORIDE 50 MG: 25 TABLET, FILM COATED ORAL at 02:01

## 2023-01-30 RX ADMIN — AMLODIPINE BESYLATE 10 MG: 10 TABLET ORAL at 09:01

## 2023-01-30 RX ADMIN — PREDNISONE 5 MG: 5 TABLET ORAL at 09:01

## 2023-01-30 RX ADMIN — PANTOPRAZOLE SODIUM 40 MG: 40 TABLET, DELAYED RELEASE ORAL at 04:01

## 2023-01-30 RX ADMIN — APIXABAN 5 MG: 5 TABLET, FILM COATED ORAL at 08:01

## 2023-01-30 RX ADMIN — QUETIAPINE FUMARATE 12.5 MG: 25 TABLET ORAL at 08:01

## 2023-01-30 RX ADMIN — FUROSEMIDE 40 MG: 40 TABLET ORAL at 09:01

## 2023-01-30 RX ADMIN — LIDOCAINE 1 PATCH: 50 PATCH CUTANEOUS at 05:01

## 2023-01-30 RX ADMIN — LEVOTHYROXINE SODIUM 25 MCG: 25 TABLET ORAL at 05:01

## 2023-01-30 RX ADMIN — ATOVAQUONE 1500 MG: 750 SUSPENSION ORAL at 09:01

## 2023-01-30 RX ADMIN — MAGNESIUM OXIDE TAB 400 MG (241.3 MG ELEMENTAL MG) 400 MG: 400 (241.3 MG) TAB at 09:01

## 2023-01-30 NOTE — ASSESSMENT & PLAN NOTE
Patient with acute kidney injury likely due to microscopic polyangiits with granulomatosis (MPA) WILFREDO is currently stable. Labs reviewed- Renal function/electrolytes with Estimated Creatinine Clearance: 7.6 mL/min (A) (based on SCr of 5.3 mg/dL (H)). according to latest data. Monitor urine output and serial BMP and adjust therapy as needed. Avoid nephrotoxins and renally dose meds for GFR listed above.  Had complex course with DAH, requiring pulse dose steroids, plasmapheresis and then rituximab and cyclophosphamide  -continue prednisone taper  -continue atovaquone for PJP ppx  -will need outpatient Rheum follow up

## 2023-01-30 NOTE — PROGRESS NOTES
[facility] ([place] [address])  Nephrology  Progress Note    Patient Name: Kristin Goodman  MRN: [id number]  Admission Date: 1/9/2023  Hospital Length of Stay: 21 days  Attending Provider: Paul Clayton MD   Primary Care Physician: Lori Hernandez MD  Principal Problem:Acute renal failure on dialysis    Subjective:     HPI: Ms Goodman is a 77-year-old woman with hypertension, hypothyroidism, HFpEF (most recent TTE with EF 65% with G1DD), pulmonary hypertension, current ESBL E. coli UTI, osteoarthritis, chronic back pain, DONAVAN and microscopic polyangiitis complicated base on biopsy from 10/26 by renal failure, GIB and respiratory failure with history of diffuse alveolar hemorrhage s/p IV Solumedrol, PLEX x5 sessions, Rituximab x4 doses and cyclophosphamide however now  just on steroid taper (cyclophosphamide appears to be hold secondary to anemia) now iHD dependent twice weekly via tunneled HD catheter for metabolic clearance (follows with Dr. Mojica with [facility]) who presented from [facility] for TDC removal in the setting of positive blood cultures growing Enterococcus faecalis and Bacillus species from cultures obtained on 1/5 & 1/7. In addition patient with reported syncopal event and sluggish flows on HD on 1/9 (incomplete session, daughter attributes to iron infusion) with last full session of iHD being on 1/6. She reports still making good urine without any urinary complaints today. Nephrology has been consulted for inpatient HD needs.      Interval History: Patient seen and examined. Afebrile overnight with pulse ranging from 100-70s bpm. Systolic blood pressures ranging from 140-130s mmHg. She is saturating +93% on room air with two unmeasured voids in the last 24 hours. Tentative plan to be discharged today per primary team. No acute indications for RRT at this time.    Review of patient's allergies indicates:   Allergen Reactions    Ampicillin     Peaches [peach (prunus  persica)] Other (See Comments)     Pt unable to state type of reaction. Information obtained from daughter who states she was informed of allergy from patient.    Penicillins      Other reaction(s): Hives, anaphylaxis    Sulfa (sulfonamide antibiotics) Rash and Hives     Current Facility-Administered Medications   Medication Frequency    acetaminophen tablet 650 mg Q4H PRN    albuterol-ipratropium 2.5 mg-0.5 mg/3 mL nebulizer solution 3 mL Q4H PRN    aluminum-magnesium hydroxide 200-200 mg/5 mL suspension 30 mL QID PRN    amLODIPine tablet 10 mg Daily    apixaban tablet 10 mg BID    atovaquone 750 mg/5 mL oral liquid 1,500 mg Daily    B-complex with vitamin C tablet 1 tablet Daily    bisacodyL suppository 10 mg Daily PRN    carvediloL tablet 12.5 mg BID    cloNIDine tablet 0.1 mg Q8H PRN    epoetin hira injection 4,000 Units Every Tues, Thurs, Sat    fluticasone propionate 50 mcg/actuation nasal spray 50 mcg BID    furosemide tablet 40 mg Daily    heparin (porcine) injection 1,000 Units PRN    hydrALAZINE tablet 50 mg Q8H    levothyroxine tablet 25 mcg Before breakfast    LIDOcaine 5 % patch 1 patch Q24H    magnesium oxide tablet 400 mg Daily    meclizine tablet 25 mg TID PRN    melatonin tablet 6 mg Nightly PRN    methocarbamoL tablet 500 mg TID PRN    naloxone 0.4 mg/mL injection 0.02 mg PRN    ondansetron injection 4 mg Q8H PRN    pantoprazole EC tablet 40 mg BID AC    predniSONE tablet 5 mg Daily    prochlorperazine injection Soln 5 mg Q6H PRN    quetiapine split tablet 12.5 mg QHS    senna-docusate 8.6-50 mg per tablet 1 tablet BID PRN    simethicone chewable tablet 80 mg QID PRN    sodium chloride 0.9% bolus 250 mL 250 mL PRN    sodium chloride 0.9% flush 10 mL PRN    sodium chloride 0.9% flush 10 mL Q6H PRN    sucralfate tablet 1 g TID PRN    tiZANidine tablet 4 mg BID PRN    traMADoL tablet 50 mg Q8H PRN    white petrolatum 41 % ointment Daily     Facility-Administered  Medications Ordered in Other Encounters   Medication Frequency    celecoxib capsule 400 mg Once    fentaNYL 50 mcg/mL injection  mcg PRN    LIDOcaine (PF) 10 mg/ml (1%) injection 10 mg Once PRN    LIDOcaine (PF) 10 mg/ml (1%) injection 10 mg Once    midazolam (VERSED) 1 mg/mL injection 0.5-4 mg PRN    ropivacaine 0.2% Nimbus PainPRO Pump infusion 500 ML Continuous       Objective:     Vital Signs (Most Recent):  Temp: 98.4 °F (36.9 °C) (01/30/23 0433)  Pulse: 104 (01/30/23 0724)  Resp: 20 (01/30/23 0433)  BP: (!) 144/63 (01/30/23 0433)  SpO2: (!) 93 % (01/30/23 0433)   Vital Signs (24h Range):  Temp:  [98 °F (36.7 °C)-98.4 °F (36.9 °C)] 98.4 °F (36.9 °C)  Pulse:  [] 104  Resp:  [18-20] 20  SpO2:  [93 %-97 %] 93 %  BP: (116-144)/(59-63) 144/63     Weight: 59.6 kg (131 lb 6.3 oz) (01/29/23 0400)  Body mass index is 24.83 kg/m².  Body surface area is 1.6 meters squared.    I/O last 3 completed shifts:  In: 270 [P.O.:270]  Out: -     Physical Exam  Vitals and nursing note reviewed.   Constitutional:       General: She is awake. She is not in acute distress.     Appearance: Normal appearance. She is overweight. She is not ill-appearing or diaphoretic.   HENT:      Head: Normocephalic and atraumatic.      Right Ear: External ear normal.      Left Ear: External ear normal.      Nose: Nose normal.      Mouth/Throat:      Mouth: Mucous membranes are moist.      Pharynx: Oropharynx is clear. No oropharyngeal exudate or posterior oropharyngeal erythema.   Eyes:      General: No scleral icterus.        Right eye: No discharge.         Left eye: No discharge.      Extraocular Movements: Extraocular movements intact.      Conjunctiva/sclera: Conjunctivae normal.   Cardiovascular:      Rate and Rhythm: Normal rate.      Heart sounds: Murmur heard.   Systolic murmur is present with a grade of 1/6.     No friction rub. No gallop.      Comments: Bilateral pitting lower extremity edema which extends  proximally.  Pulmonary:      Effort: Pulmonary effort is normal. No respiratory distress.      Breath sounds: No wheezing, rhonchi or rales.   Chest:      Comments: Tunneled HD catheters to right chest wall.   Abdominal:      General: Bowel sounds are normal. There is no distension.      Palpations: Abdomen is soft.      Tenderness: There is no abdominal tenderness.   Musculoskeletal:      Cervical back: Neck supple.      Right lower le+ Pitting Edema present.      Left lower le+ Pitting Edema present.   Skin:     General: Skin is warm and dry.      Capillary Refill: Capillary refill takes less than 2 seconds.      Coloration: Skin is not jaundiced.      Findings: No erythema.   Neurological:      General: No focal deficit present.      Mental Status: She is alert. Mental status is at baseline.      Cranial Nerves: No cranial nerve deficit.   Psychiatric:         Mood and Affect: Mood normal.         Behavior: Behavior normal. Behavior is cooperative.       Significant Labs:  BMP:   Recent Labs   Lab 23  0409   GLU 86      K 3.6      CO2 24   BUN 38*   CREATININE 4.2*   CALCIUM 8.7   MG 1.9       CBC:   Recent Labs   Lab 23  0447   WBC 12.24   RBC 2.80*   HGB 7.7*   HCT 24.3*      MCV 87   MCH 27.5   MCHC 31.7*       CMP:   Recent Labs   Lab 23  0409   GLU 86   CALCIUM 8.7      K 3.6   CO2 24      BUN 38*   CREATININE 4.2*       Coagulation:   Recent Labs   Lab 23  0356   APTT 62.1*       Microbiology Results (last 7 days)       ** No results found for the last 168 hours. **          Specimen (24h ago, onward)      None          Significant Imaging:  I have reviewed all imagining in the last 24 hours.    Assessment/Plan:     * Acute renal failure on dialysis  Primary pauci-immune necrotizing and crescentic glomerulonephritis  Microscopic polyangiitis  WILFREDO (acute kidney injury)  Miscroscopic polyangiitis with renal (biopsy proven pauci immune necrotizing  & crescentic glomerulonephritis) and pulmonary involvement s/p Solumedrol 1,000 mg IV x3 doses, PLEX x5 sessions (10/26/2022, 10/27/2022, 10/29/2022, 10/30/2022, 11/01/2022, 11/02/2022, 11/03/2022), Rituximab 375 mg/m^2 x4 (600 mg) on 10/27/2022, 11/03/2922,11/10/2022,11/172022) with cyclophosphamide 7.5 mg/kg on 10/27/2022 and 11/10/2022 (every 14 days). Now iHD for which she receives HD on one but usually twice weekly for metabolic clearance via tunneled HD catheter roughly x2 a week with last HD Friday (01/06/2023).    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  2)  Necrotizing cresentic glomerulonephritis/[id number] necrotizing cresecentic glomerulonephritis.  3)  Focal acute pyelonephritis.  4)  Mild-to-moderate arterionephrosclerosis    Plan/Recommendations:  - no acute indications for HD today  - tentative plans to discharge today per primary team however pending HD chair at this time  - can continue Lasix 40 mg PO daily for now  - daily RFPs and magnesium  - currently on prednisone 5 mg daily with atovaquone 1.5 grams faily for PJP prophylaxis   - renal diet if not NPO  - strict I/Os and daily weights  - renally dose all medications to eGFR  - avoid nephrotoxic agents when feasible (i.e. intra-arterial contrast, NSAIDs, supra-therapeutic vancomycin troughs, etc.)    Acute deep vein thrombosis (DVT) of non-extremity vein - subclavian  - management per primary team  - transitioned to Eliquis on 1/26    Bacteremia due to Enterococcus  - management per ID and primary  - repeat blood cultures NGTD    Urinary tract infection due to extended-spectrum beta lactamase (ESBL) producing Escherichia coli  - Infectious Disease consulted and following  - management per primary  team    Anemia due to chronic kidney disease  - goal hemoglobin 10-12,   - most recent iron panel with iron 15, transferrin 138, TIBC 204, 7% saturation and ferritin 378  - transfuse for hemoglobin < 7.0  - defer IV iron in light of recent bacteremia, currently receiving EPO 4,000 units SQ with HD    Primary hypertension  - management per primary team    Hypothyroid  - management per primary team    Thank you for your consult. I will follow-up with patient. Please contact us if you have any additional questions.    Pj Paz MD  Nephrology  [facility] - Intensive Care ([place]

## 2023-01-30 NOTE — ASSESSMENT & PLAN NOTE
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 Now iHD for which she receives HD on one but usually twice weekly for metabolic clearance via tunneled HD catheter roughly x2 a week with last HD Friday (01/06/2023).    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  3)  Focal acute pyelonephritis.  4)  Mild-to-moderate arterionephrosclerosis    Plan/Recommendations:  - no acute indications for HD today  - tentative plans to discharge today per primary team however pending HD chair at this time  - can continue Lasix 40 mg PO daily for now  - daily RFPs and magnesium  - currently on prednisone 5 mg daily with atovaquone 1.5 grams faily for PJP prophylaxis   - renal diet if not NPO  - strict I/Os and daily weights  - renally dose all medications to eGFR  - avoid nephrotoxic agents when feasible (i.e. intra-arterial contrast, NSAIDs, supra-therapeutic vancomycin troughs, etc.)

## 2023-01-30 NOTE — SUBJECTIVE & OBJECTIVE
Interval History: No acute events. Pt resting comfortably in bed, no concerns. Awaiting HD chair.     Review of Systems   Constitutional:  Positive for activity change. Negative for fatigue and fever.   HENT:  Negative for sore throat and trouble swallowing.    Eyes:  Negative for visual disturbance.   Respiratory:  Negative for cough and shortness of breath.    Cardiovascular:  Negative for chest pain and leg swelling.   Gastrointestinal:  Negative for abdominal distention and abdominal pain.   Genitourinary:  Negative for difficulty urinating.   Musculoskeletal:  Negative for back pain and gait problem.   Skin:  Negative for color change.   Neurological:  Positive for weakness. Negative for dizziness, light-headedness and headaches.   Psychiatric/Behavioral:  Negative for agitation and confusion.    Objective:     Vital Signs (Most Recent):  Temp: 98.6 °F (37 °C) (01/30/23 1049)  Pulse: 75 (01/30/23 1446)  Resp: 16 (01/30/23 1049)  BP: (!) 130/59 (01/30/23 1049)  SpO2: 98 % (01/30/23 1049)   Vital Signs (24h Range):  Temp:  [98 °F (36.7 °C)-98.6 °F (37 °C)] 98.6 °F (37 °C)  Pulse:  [] 75  Resp:  [16-20] 16  SpO2:  [93 %-98 %] 98 %  BP: (130-144)/(59-63) 130/59     Weight: 59.6 kg (131 lb 6.3 oz)  Body mass index is 24.83 kg/m².    Intake/Output Summary (Last 24 hours) at 1/30/2023 1547  Last data filed at 1/30/2023 1518  Gross per 24 hour   Intake 1020 ml   Output --   Net 1020 ml        Physical Exam  Vitals reviewed.   Constitutional:       General: She is not in acute distress.     Appearance: Normal appearance. She is not toxic-appearing.   HENT:      Head: Normocephalic and atraumatic.   Eyes:      General: No scleral icterus.  Cardiovascular:      Rate and Rhythm: Normal rate and regular rhythm.      Pulses: Normal pulses.      Heart sounds: No murmur heard.  Pulmonary:      Effort: Pulmonary effort is normal. No respiratory distress.      Breath sounds: No wheezing, rhonchi or rales.   Chest:       Comments: L TDC  Abdominal:      General: Bowel sounds are normal. There is no distension.      Tenderness: There is no abdominal tenderness. There is no guarding.   Musculoskeletal:      Right lower leg: No edema.      Left lower leg: No edema.   Skin:     General: Skin is warm.   Neurological:      General: No focal deficit present.      Mental Status: She is alert and oriented to person, place, and time. Mental status is at baseline.   Psychiatric:         Behavior: Behavior normal.         Thought Content: Thought content normal.

## 2023-01-30 NOTE — PLAN OF CARE
BRIAN contacted Ankita with  [facility] at [phone] regarding status of HD chair.   HD chair is pending medical clearance.   Ankita is sending out an escalation and will follow up with  tomorrow.   CM contact #  provided for followup.

## 2023-01-30 NOTE — ASSESSMENT & PLAN NOTE
Has required transfusions during recent inpatient/LTAC stays   -was receiving EPO infusion at nephrology direction.   Hb 6.7 on 01/16.  Ordered a unit of blood.  Consent obtained.  Continue to monitor CBC.  Goal for transfusion hemoglobin less than 7.  EPO during HD  Stable

## 2023-01-30 NOTE — SUBJECTIVE & OBJECTIVE
Interval History: Patient seen and examined. Afebrile overnight with pulse ranging from 100-70s bpm. Systolic blood pressures ranging from 140-130s mmHg. She is saturating +93% on room air with two unmeasured voids in the last 24 hours. Tentative plan to be discharged today per primary team. No acute indications for RRT at this time.    Review of patient's allergies indicates:   Allergen Reactions    Ampicillin     Peaches [peach (prunus persica)] Other (See Comments)     Pt unable to state type of reaction. Information obtained from daughter who states she was informed of allergy from patient.    Penicillins      Other reaction(s): Hives, anaphylaxis    Sulfa (sulfonamide antibiotics) Rash and Hives     Current Facility-Administered Medications   Medication Frequency    acetaminophen tablet 650 mg Q4H PRN    albuterol-ipratropium 2.5 mg-0.5 mg/3 mL nebulizer solution 3 mL Q4H PRN    aluminum-magnesium hydroxide 200-200 mg/5 mL suspension 30 mL QID PRN    amLODIPine tablet 10 mg Daily    apixaban tablet 10 mg BID    atovaquone 750 mg/5 mL oral liquid 1,500 mg Daily    B-complex with vitamin C tablet 1 tablet Daily    bisacodyL suppository 10 mg Daily PRN    carvediloL tablet 12.5 mg BID    cloNIDine tablet 0.1 mg Q8H PRN    epoetin hira injection 4,000 Units Every Tues, Thurs, Sat    fluticasone propionate 50 mcg/actuation nasal spray 50 mcg BID    furosemide tablet 40 mg Daily    heparin (porcine) injection 1,000 Units PRN    hydrALAZINE tablet 50 mg Q8H    levothyroxine tablet 25 mcg Before breakfast    LIDOcaine 5 % patch 1 patch Q24H    magnesium oxide tablet 400 mg Daily    meclizine tablet 25 mg TID PRN    melatonin tablet 6 mg Nightly PRN    methocarbamoL tablet 500 mg TID PRN    naloxone 0.4 mg/mL injection 0.02 mg PRN    ondansetron injection 4 mg Q8H PRN    pantoprazole EC tablet 40 mg BID AC    predniSONE tablet 5 mg Daily    prochlorperazine injection Soln 5 mg Q6H PRN    quetiapine split tablet 12.5 mg  QHS    senna-docusate 8.6-50 mg per tablet 1 tablet BID PRN    simethicone chewable tablet 80 mg QID PRN    sodium chloride 0.9% bolus 250 mL 250 mL PRN    sodium chloride 0.9% flush 10 mL PRN    sodium chloride 0.9% flush 10 mL Q6H PRN    sucralfate tablet 1 g TID PRN    tiZANidine tablet 4 mg BID PRN    traMADoL tablet 50 mg Q8H PRN    white petrolatum 41 % ointment Daily     Facility-Administered Medications Ordered in Other Encounters   Medication Frequency    celecoxib capsule 400 mg Once    fentaNYL 50 mcg/mL injection  mcg PRN    LIDOcaine (PF) 10 mg/ml (1%) injection 10 mg Once PRN    LIDOcaine (PF) 10 mg/ml (1%) injection 10 mg Once    midazolam (VERSED) 1 mg/mL injection 0.5-4 mg PRN    ropivacaine 0.2% Nimbus PainPRO Pump infusion 500 ML Continuous       Objective:     Vital Signs (Most Recent):  Temp: 98.4 °F (36.9 °C) (01/30/23 0433)  Pulse: 104 (01/30/23 0724)  Resp: 20 (01/30/23 0433)  BP: (!) 144/63 (01/30/23 0433)  SpO2: (!) 93 % (01/30/23 0433)   Vital Signs (24h Range):  Temp:  [98 °F (36.7 °C)-98.4 °F (36.9 °C)] 98.4 °F (36.9 °C)  Pulse:  [] 104  Resp:  [18-20] 20  SpO2:  [93 %-97 %] 93 %  BP: (116-144)/(59-63) 144/63     Weight: 59.6 kg (131 lb 6.3 oz) (01/29/23 0400)  Body mass index is 24.83 kg/m².  Body surface area is 1.6 meters squared.    I/O last 3 completed shifts:  In: 270 [P.O.:270]  Out: -     Physical Exam  Vitals and nursing note reviewed.   Constitutional:       General: She is awake. She is not in acute distress.     Appearance: Normal appearance. She is overweight. She is not ill-appearing or diaphoretic.   HENT:      Head: Normocephalic and atraumatic.      Right Ear: External ear normal.      Left Ear: External ear normal.      Nose: Nose normal.      Mouth/Throat:      Mouth: Mucous membranes are moist.      Pharynx: Oropharynx is clear. No oropharyngeal exudate or posterior oropharyngeal erythema.   Eyes:      General: No scleral icterus.        Right eye: No  discharge.         Left eye: No discharge.      Extraocular Movements: Extraocular movements intact.      Conjunctiva/sclera: Conjunctivae normal.   Cardiovascular:      Rate and Rhythm: Normal rate.      Heart sounds: Murmur heard.   Systolic murmur is present with a grade of 1/6.     No friction rub. No gallop.      Comments: Bilateral pitting lower extremity edema which extends proximally.  Pulmonary:      Effort: Pulmonary effort is normal. No respiratory distress.      Breath sounds: No wheezing, rhonchi or rales.   Chest:      Comments: Tunneled HD catheters to right chest wall.   Abdominal:      General: Bowel sounds are normal. There is no distension.      Palpations: Abdomen is soft.      Tenderness: There is no abdominal tenderness.   Musculoskeletal:      Cervical back: Neck supple.      Right lower le+ Pitting Edema present.      Left lower le+ Pitting Edema present.   Skin:     General: Skin is warm and dry.      Capillary Refill: Capillary refill takes less than 2 seconds.      Coloration: Skin is not jaundiced.      Findings: No erythema.   Neurological:      General: No focal deficit present.      Mental Status: She is alert. Mental status is at baseline.      Cranial Nerves: No cranial nerve deficit.   Psychiatric:         Mood and Affect: Mood normal.         Behavior: Behavior normal. Behavior is cooperative.       Significant Labs:  BMP:   Recent Labs   Lab 23  0409   GLU 86      K 3.6      CO2 24   BUN 38*   CREATININE 4.2*   CALCIUM 8.7   MG 1.9       CBC:   Recent Labs   Lab 23  0447   WBC 12.24   RBC 2.80*   HGB 7.7*   HCT 24.3*      MCV 87   MCH 27.5   MCHC 31.7*       CMP:   Recent Labs   Lab 23  0409   GLU 86   CALCIUM 8.7      K 3.6   CO2 24      BUN 38*   CREATININE 4.2*       Coagulation:   Recent Labs   Lab 23  0356   APTT 62.1*       Microbiology Results (last 7 days)       ** No results found for the last 168 hours. **           Specimen (24h ago, onward)      None          Significant Imaging:  I have reviewed all imagining in the last 24 hours.

## 2023-01-30 NOTE — PROGRESS NOTES
[facility] ([address] Medicine  Progress Note    Patient Name: Kristin Goodman  MRN: [id number]  Patient Class: IP- Inpatient   Admission Date: 1/9/2023  Length of Stay: 21 days  Attending Physician: Paul Clayton MD  Primary Care Provider: Lori Hernandez MD        Subjective:     Principal Problem:Acute renal failure on dialysis        HPI:  77-year-old  woman with HTN, hypothyroidism, HFpEF, and osteoarthritis and new acute renal failure due to new diagnosis of Microscopic Polyangiitis s/p renal biopsy and requiring hemodialysis is transferred from [facility] for concerns of new bacteremia.     She was hospitalized from 10/17/22-12/13/22 then transferred to [facility].  Microscopic polyangiitis with pulmonary and renal involvement had suffered respiratory failure with diffuse alveolar hemorrhage, gi bleeding, and renal replacement therapy. S/p Rheumatology consultation and pulse IV steroids + plasmapheresis with Transfusion medicine started on Rituximab and Cyclophosphamide.  Continues on Steroid taper for immunosuppression.     More recently earlier this week noted to have a urine culture grow ESBL E.coli Cystitis, started on meropenem, then she had blood cultures from 1/5 and 1/7 that have grown Enterococcus (amp sensitive) vancomycin started today, patient had sluggish HD catheter with dialysis.   Also ED report that patient may have had syncope during HD today.     She is transferred for continued infectious workup and management as well as removal of tunneled HD line for line holiday.       Overview/Hospital Course:  Seen by ID and nephrology. Plan for line holiday and IR consulted.      Interval History: No acute events. Pt resting comfortably in bed, no concerns. Awaiting HD chair.     Review of Systems   Constitutional:  Positive for activity change. Negative for fatigue and fever.   HENT:  Negative for sore throat and trouble swallowing.    Eyes:   Negative for visual disturbance.   Respiratory:  Negative for cough and shortness of breath.    Cardiovascular:  Negative for chest pain and leg swelling.   Gastrointestinal:  Negative for abdominal distention and abdominal pain.   Genitourinary:  Negative for difficulty urinating.   Musculoskeletal:  Negative for back pain and gait problem.   Skin:  Negative for color change.   Neurological:  Positive for weakness. Negative for dizziness, light-headedness and headaches.   Psychiatric/Behavioral:  Negative for agitation and confusion.    Objective:     Vital Signs (Most Recent):  Temp: 98.6 °F (37 °C) (01/30/23 1049)  Pulse: 75 (01/30/23 1446)  Resp: 16 (01/30/23 1049)  BP: (!) 130/59 (01/30/23 1049)  SpO2: 98 % (01/30/23 1049)   Vital Signs (24h Range):  Temp:  [98 °F (36.7 °C)-98.6 °F (37 °C)] 98.6 °F (37 °C)  Pulse:  [] 75  Resp:  [16-20] 16  SpO2:  [93 %-98 %] 98 %  BP: (130-144)/(59-63) 130/59     Weight: 59.6 kg (131 lb 6.3 oz)  Body mass index is 24.83 kg/m².    Intake/Output Summary (Last 24 hours) at 1/30/2023 1547  Last data filed at 1/30/2023 1518  Gross per 24 hour   Intake 1020 ml   Output --   Net 1020 ml        Physical Exam  Vitals reviewed.   Constitutional:       General: She is not in acute distress.     Appearance: Normal appearance. She is not toxic-appearing.   HENT:      Head: Normocephalic and atraumatic.   Eyes:      General: No scleral icterus.  Cardiovascular:      Rate and Rhythm: Normal rate and regular rhythm.      Pulses: Normal pulses.      Heart sounds: No murmur heard.  Pulmonary:      Effort: Pulmonary effort is normal. No respiratory distress.      Breath sounds: No wheezing, rhonchi or rales.   Chest:      Comments: L TDC  Abdominal:      General: Bowel sounds are normal. There is no distension.      Tenderness: There is no abdominal tenderness. There is no guarding.   Musculoskeletal:      Right lower leg: No edema.      Left lower leg: No edema.   Skin:     General: Skin  is warm.   Neurological:      General: No focal deficit present.      Mental Status: She is alert and oriented to person, place, and time. Mental status is at baseline.   Psychiatric:         Behavior: Behavior normal.         Thought Content: Thought content normal.             Assessment/Plan:      * Acute renal failure on dialysis  -due to Microscopic Polyangiitis  - IR consulted. S/p permcath placement  on 1/17/23   - US of UE showed DVTs in R and L UE.   - CXR:The dialysis catheter remains in position  - S/p R TDC replacement on 01/23 by IR.  On 01/24, TDC was not working again for hemodialysis.  Consulted IR again.  S/p Left TDC placement on 1/25.   - Nephrology following. Patient will need HD chair set up prior to d/c.  -s/p Removal of R TDC  -Discharge planning. Awaiting HD chair    WILFREDO (acute kidney injury)        Acute deep vein thrombosis (DVT) of non-extremity vein - subclavian  Heparin drip started 1/19 pending procedures to re-establish HD access.  Switched to DOAC    Pauci-immune vasculitis  Continue steroid taper    Bacteremia due to Enterococcus  Positive blood cxs 1/5, 1/7, 1/9. Received 1 gm IV vancomycin at LTAC on 1/10.   HD tunelled cath removed 1/10.  Also has midline from LTAC - removed. Surface echo - no vegetation. ID consulted. Last surveillance bcxs 1/11/23 NGTD  Per ID - 2-weeks vanc from negative bcxs/line removal - end date 1/24. Completed.    Urinary tract infection due to extended-spectrum beta lactamase (ESBL) producing Escherichia coli  Urine culture from 1/05 with ESBL E.coli   -Ertapenem renal dosing 500mg IV q24 ordered, completed 5 days (1/5-1/10). Missed dose on 1/9 due to transfer to hospital    Anemia due to chronic kidney disease  Has required transfusions during recent inpatient/LTAC stays   -was receiving EPO infusion at nephrology direction.   Hb 6.7 on 01/16.  Ordered a unit of blood.  Consent obtained.  Continue to monitor CBC.  Goal for transfusion hemoglobin less  than 7.  EPO during HD  Stable    Primary pauci-immune necrotizing and crescentic glomerulonephritis  Patient with acute kidney injury likely due to microscopic polyangiits with granulomatosis (MPA) WILFREDO is currently stable. Labs reviewed- Renal function/electrolytes with Estimated Creatinine Clearance: 7.6 mL/min (A) (based on SCr of 5.3 mg/dL (H)). according to latest data. Monitor urine output and serial BMP and adjust therapy as needed. Avoid nephrotoxins and renally dose meds for GFR listed above.  Had complex course with DAH, requiring pulse dose steroids, plasmapheresis and then rituximab and cyclophosphamide  -continue prednisone taper  -continue atovaquone for PJP ppx  -will need outpatient Rheum follow up    Gastric reflux  Continue BID PPI    Dysphagia  Continue renal diet   SLP following     Microscopic polyangiitis  -continue steroid taper   -patient is on OI prophylaxis with atovaquone 1500mg po daily  -continued on Protonix 40mg po bid while on glucocorticoids.     Impaired mobility  OT/PT     Chronic diastolic heart failure  Has preserved EF   -HD was primary volume maintenance   -continue Lasix 40mg po daily - consider higher or more frequent doses are required during her LIne holiday     Syncope  Report of possible syncope with HD,  Dizzy spells noted per LTAC notes - pt s/p MRI brain on 12/8 w/o acute findings   -neurology prior eval has recommended PT/OT for vestibular therapy  -Future outpatient cardiology visit for possible tilt-table eval.   -refer to neurology at discharge for BPPV follow up    Primary hypertension  Continue carvedilol  Add amlodipine  -home lisinopril is on hold due to her WILFREDO    Hypothyroid  Continue levothyroxine 25mcg       VTE Risk Mitigation (From admission, onward)         Ordered     apixaban tablet 5 mg  2 times daily         01/30/23 0919     heparin (porcine) injection 1,000 Units  As needed (PRN)         01/25/23 1128     heparin (porcine) injection 1,000 Units  As  needed (PRN)         01/23/23 1321     heparin (porcine) injection 1,000 Units  As needed (PRN)         01/17/23 1421     heparin (porcine) injection 1,000 Units  As needed (PRN)         01/09/23 2330     Place sequential compression device  Until discontinued         01/09/23 2330                Discharge Planning   ANTHONY: 1/30/2023     Code Status: Full Code   Is the patient medically ready for discharge?: Yes    Reason for patient still in hospital (select all that apply): Pending disposition  Discharge Plan A: Home Health   Discharge Delays: None known at this time              Paul Clayton MD  Department of Hospital Medicine   [facility] - Intensive Care ([place]-16)

## 2023-01-31 LAB
ALBUMIN SERPL BCP-MCNC: 2.4 G/DL (ref 3.5–5.2)
ANION GAP SERPL CALC-SCNC: 13 MMOL/L (ref 8–16)
BUN SERPL-MCNC: 58 MG/DL (ref 8–23)
CALCIUM SERPL-MCNC: 9 MG/DL (ref 8.7–10.5)
CHLORIDE SERPL-SCNC: 100 MMOL/L (ref 95–110)
CO2 SERPL-SCNC: 25 MMOL/L (ref 23–29)
CREAT SERPL-MCNC: 5.4 MG/DL (ref 0.5–1.4)
EST. GFR  (NO RACE VARIABLE): 7.8 ML/MIN/1.73 M^2
GLUCOSE SERPL-MCNC: 103 MG/DL (ref 70–110)
PHOSPHATE SERPL-MCNC: 3.5 MG/DL (ref 2.7–4.5)
POTASSIUM SERPL-SCNC: 3.9 MMOL/L (ref 3.5–5.1)
SODIUM SERPL-SCNC: 138 MMOL/L (ref 136–145)

## 2023-01-31 PROCEDURE — 97116 GAIT TRAINING THERAPY: CPT | Mod: CQ

## 2023-01-31 PROCEDURE — 99232 PR SUBSEQUENT HOSPITAL CARE,LEVL II: ICD-10-PCS | Mod: ,,, | Performed by: HOSPITALIST

## 2023-01-31 PROCEDURE — 80069 RENAL FUNCTION PANEL: CPT | Performed by: STUDENT IN AN ORGANIZED HEALTH CARE EDUCATION/TRAINING PROGRAM

## 2023-01-31 PROCEDURE — 25000242 PHARM REV CODE 250 ALT 637 W/ HCPCS: Performed by: STUDENT IN AN ORGANIZED HEALTH CARE EDUCATION/TRAINING PROGRAM

## 2023-01-31 PROCEDURE — 25000003 PHARM REV CODE 250: Performed by: HOSPITALIST

## 2023-01-31 PROCEDURE — 36415 COLL VENOUS BLD VENIPUNCTURE: CPT | Performed by: STUDENT IN AN ORGANIZED HEALTH CARE EDUCATION/TRAINING PROGRAM

## 2023-01-31 PROCEDURE — 25000003 PHARM REV CODE 250: Performed by: INTERNAL MEDICINE

## 2023-01-31 PROCEDURE — 63600175 PHARM REV CODE 636 W HCPCS: Performed by: HOSPITALIST

## 2023-01-31 PROCEDURE — 12000002 HC ACUTE/MED SURGE SEMI-PRIVATE ROOM

## 2023-01-31 PROCEDURE — 63600175 PHARM REV CODE 636 W HCPCS: Mod: JG | Performed by: INTERNAL MEDICINE

## 2023-01-31 PROCEDURE — 99232 SBSQ HOSP IP/OBS MODERATE 35: CPT | Mod: ,,, | Performed by: HOSPITALIST

## 2023-01-31 RX ORDER — HYDRALAZINE HYDROCHLORIDE 50 MG/1
50 TABLET, FILM COATED ORAL EVERY 8 HOURS
Qty: 90 TABLET | Refills: 0 | Status: SHIPPED | OUTPATIENT
Start: 2023-01-31 | End: 2023-02-17 | Stop reason: SDUPTHER

## 2023-01-31 RX ORDER — LEVOTHYROXINE SODIUM 25 UG/1
25 TABLET ORAL DAILY
Qty: 30 TABLET | Refills: 0 | Status: SHIPPED | OUTPATIENT
Start: 2023-01-31 | End: 2023-02-17 | Stop reason: SDUPTHER

## 2023-01-31 RX ORDER — PREDNISONE 5 MG/1
5 TABLET ORAL DAILY
Qty: 30 TABLET | Refills: 0 | Status: SHIPPED | OUTPATIENT
Start: 2023-01-31 | End: 2023-02-17 | Stop reason: SDUPTHER

## 2023-01-31 RX ORDER — CARVEDILOL 12.5 MG/1
12.5 TABLET ORAL 2 TIMES DAILY
Qty: 60 TABLET | Refills: 0 | Status: SHIPPED | OUTPATIENT
Start: 2023-01-31 | End: 2023-02-17 | Stop reason: SDUPTHER

## 2023-01-31 RX ORDER — FUROSEMIDE 40 MG/1
40 TABLET ORAL DAILY
Qty: 30 TABLET | Refills: 0 | Status: SHIPPED | OUTPATIENT
Start: 2023-02-01 | End: 2023-02-17 | Stop reason: SDUPTHER

## 2023-01-31 RX ORDER — LOPERAMIDE HYDROCHLORIDE 2 MG/1
2 CAPSULE ORAL 4 TIMES DAILY PRN
Status: DISCONTINUED | OUTPATIENT
Start: 2023-01-31 | End: 2023-02-03 | Stop reason: HOSPADM

## 2023-01-31 RX ORDER — ATOVAQUONE 750 MG/5ML
1500 SUSPENSION ORAL DAILY
Qty: 300 ML | Refills: 0 | Status: SHIPPED | OUTPATIENT
Start: 2023-01-31 | End: 2023-02-17 | Stop reason: SDUPTHER

## 2023-01-31 RX ORDER — METHOCARBAMOL 500 MG/1
500 TABLET, FILM COATED ORAL 3 TIMES DAILY PRN
Qty: 30 TABLET | Refills: 0 | Status: SHIPPED | OUTPATIENT
Start: 2023-01-31 | End: 2023-02-17 | Stop reason: SDUPTHER

## 2023-01-31 RX ORDER — QUETIAPINE FUMARATE 25 MG/1
25 TABLET, FILM COATED ORAL NIGHTLY
Qty: 30 TABLET | Refills: 0 | Status: SHIPPED | OUTPATIENT
Start: 2023-01-31 | End: 2023-02-17 | Stop reason: SDUPTHER

## 2023-01-31 RX ADMIN — ATOVAQUONE 1500 MG: 750 SUSPENSION ORAL at 08:01

## 2023-01-31 RX ADMIN — PANTOPRAZOLE SODIUM 40 MG: 40 TABLET, DELAYED RELEASE ORAL at 03:01

## 2023-01-31 RX ADMIN — LEVOTHYROXINE SODIUM 25 MCG: 25 TABLET ORAL at 05:01

## 2023-01-31 RX ADMIN — FUROSEMIDE 40 MG: 40 TABLET ORAL at 08:01

## 2023-01-31 RX ADMIN — HYDRALAZINE HYDROCHLORIDE 50 MG: 25 TABLET, FILM COATED ORAL at 03:01

## 2023-01-31 RX ADMIN — HYDRALAZINE HYDROCHLORIDE 50 MG: 25 TABLET, FILM COATED ORAL at 09:01

## 2023-01-31 RX ADMIN — AMLODIPINE BESYLATE 10 MG: 10 TABLET ORAL at 08:01

## 2023-01-31 RX ADMIN — CARVEDILOL 12.5 MG: 12.5 TABLET, FILM COATED ORAL at 09:01

## 2023-01-31 RX ADMIN — HYDRALAZINE HYDROCHLORIDE 50 MG: 25 TABLET, FILM COATED ORAL at 05:01

## 2023-01-31 RX ADMIN — Medication 1 TABLET: at 08:01

## 2023-01-31 RX ADMIN — ERYTHROPOIETIN 4000 UNITS: 4000 INJECTION, SOLUTION INTRAVENOUS; SUBCUTANEOUS at 10:01

## 2023-01-31 RX ADMIN — APIXABAN 5 MG: 5 TABLET, FILM COATED ORAL at 09:01

## 2023-01-31 RX ADMIN — PREDNISONE 5 MG: 5 TABLET ORAL at 08:01

## 2023-01-31 RX ADMIN — LIDOCAINE 1 PATCH: 50 PATCH CUTANEOUS at 04:01

## 2023-01-31 RX ADMIN — WHITE PETROLATUM: 1.75 OINTMENT TOPICAL at 09:01

## 2023-01-31 RX ADMIN — FLUTICASONE PROPIONATE 50 MCG: 50 SPRAY, METERED NASAL at 09:01

## 2023-01-31 RX ADMIN — FLUTICASONE PROPIONATE 50 MCG: 50 SPRAY, METERED NASAL at 08:01

## 2023-01-31 RX ADMIN — PANTOPRAZOLE SODIUM 40 MG: 40 TABLET, DELAYED RELEASE ORAL at 06:01

## 2023-01-31 RX ADMIN — LOPERAMIDE HYDROCHLORIDE 2 MG: 2 CAPSULE ORAL at 03:01

## 2023-01-31 RX ADMIN — MAGNESIUM OXIDE TAB 400 MG (241.3 MG ELEMENTAL MG) 400 MG: 400 (241.3 MG) TAB at 09:01

## 2023-01-31 RX ADMIN — CARVEDILOL 12.5 MG: 12.5 TABLET, FILM COATED ORAL at 08:01

## 2023-01-31 RX ADMIN — QUETIAPINE FUMARATE 12.5 MG: 25 TABLET ORAL at 09:01

## 2023-01-31 RX ADMIN — APIXABAN 5 MG: 5 TABLET, FILM COATED ORAL at 08:01

## 2023-01-31 NOTE — PLAN OF CARE
Problem: Adult Inpatient Plan of Care  Goal: Plan of Care Review  Outcome: Ongoing, Progressing  Goal: Patient-Specific Goal (Individualized)  Outcome: Ongoing, Progressing    Pt  able to express needs.  No c/o pain.  Ambulating with walker and SBA .  Dialysis access placed and ready for use.  Safety maintained.  Bed in low position,  call  light in reach.

## 2023-01-31 NOTE — PT/OT/SLP PROGRESS
"Physical Therapy Treatment    Patient Name:  Kristin Goodman   MRN:  [id number]    Recommendations:     Discharge Recommendations: home health PT  Discharge Equipment Recommendations: walker, rolling, bedside commode  Barriers to discharge: None    Assessment:     Kristin Goodman is a 77 y.o. female admitted with a medical diagnosis of Acute renal failure on dialysis.  She presents with the following impairments/functional limitations: weakness, impaired functional mobility, gait instability, impaired endurance, impaired balance, impaired self care skills, decreased lower extremity function Pt required increased motivation during program, but was agreeable to go for a walk. VC for posture and increased stride length.    Rehab Prognosis: Good; patient would benefit from acute skilled PT services to address these deficits and reach maximum level of function.    Recent Surgery: * No surgery found *      Plan:     During this hospitalization, patient to be seen 3 x/week to address the identified rehab impairments via gait training, therapeutic activities, therapeutic exercises, neuromuscular re-education and progress toward the following goals:    Plan of Care Expires:  02/09/23    Subjective     Chief Complaint: "My stomach hurts."  Patient/Family Comments/goals: "Im ready to go home."  Pain/Comfort:  Pain Rating 1:  (nonspecified)  Location - Orientation 1: generalized  Location 1:  (stomach)  Pain Addressed 1: Distraction      Objective:     Communicated with RN prior to session.  Patient found up in chair with telemetry upon PT entry to room.     General Precautions: Standard, aspiration, fall  Orthopedic Precautions: N/A  Braces: N/A  Respiratory Status: Room air     Functional Mobility:  Transfers:     Sit to Stand:  stand by assistance with rolling walker  Gait: Amb 100 ft with RW with cueing for upright posture, increased stride length, and improved heel strike.      AM-PAC 6 CLICK MOBILITY  Turning over in bed " (including adjusting bedclothes, sheets and blankets)?: 4  Sitting down on and standing up from a chair with arms (e.g., wheelchair, bedside commode, etc.): 4  Moving from lying on back to sitting on the side of the bed?: 4  Moving to and from a bed to a chair (including a wheelchair)?: 3  Need to walk in hospital room?: 3  Climbing 3-5 steps with a railing?: 2  Basic Mobility Total Score: 20         Patient left up in chair with all lines intact and call button in reach..    GOALS:   Multidisciplinary Problems       Physical Therapy Goals          Problem: Physical Therapy    Goal Priority Disciplines Outcome Goal Variances Interventions   Physical Therapy Goal     PT, PT/OT Ongoing, Progressing     Description: Goals to be met by: 23     Patient will increase functional independence with mobility by performin. Sit to stand transfer with Supervision  2. Gait  x 150 feet with Supervision using Rolling Walker.   3. Stand for 8 minutes with Supervision using Rolling Walker                         Time Tracking:     PT Received On: 23  PT Start Time: 1550     PT Stop Time: 1600  PT Total Time (min): 10 min     Billable Minutes: Gait Training 10    Treatment Type: Treatment  PT/PTA: PTA     PTA Visit Number: 1     2023

## 2023-01-31 NOTE — PROGRESS NOTES
[facility] ([place] [address])  Nephrology  Progress Note    Patient Name: Kristin Goodman  MRN: [id number]  Admission Date: 1/9/2023  Hospital Length of Stay: 22 days  Attending Provider: Paul Clayton MD   Primary Care Physician: Lori Hernandez MD  Principal Problem:Acute renal failure on dialysis    Subjective:     HPI: Ms Goodman is a 77-year-old woman with hypertension, hypothyroidism, HFpEF (most recent TTE with EF 65% with G1DD), pulmonary hypertension, current ESBL E. coli UTI, osteoarthritis, chronic back pain, DONAVAN and microscopic polyangiitis complicated base on biopsy from 10/26 by renal failure, GIB and respiratory failure with history of diffuse alveolar hemorrhage s/p IV Solumedrol, PLEX x5 sessions, Rituximab x4 doses and cyclophosphamide however now  just on steroid taper (cyclophosphamide appears to be hold secondary to anemia) now iHD dependent twice weekly via tunneled HD catheter for metabolic clearance (follows with Dr. Mojica with [facility]) who presented from [facility] for TDC removal in the setting of positive blood cultures growing Enterococcus faecalis and Bacillus species from cultures obtained on 1/5 & 1/7. In addition patient with reported syncopal event and sluggish flows on HD on 1/9 (incomplete session, daughter attributes to iron infusion) with last full session of iHD being on 1/6. She reports still making good urine without any urinary complaints today. Nephrology has been consulted for inpatient HD needs.      Interval History: Patient seen and examined. Afebrile overnight with pulse ranging from 80-70s bpm. Systolic blood pressures ranging from 130-110s mmHg. She is saturating +94% on room air with seven unmeasured voids in the last 24 hours. Tentative plan to be discharged today per primary team. No acute indications for RRT at this time.    Review of patient's allergies indicates:   Allergen Reactions    Ampicillin     Peaches [peach (prunus  persica)] Other (See Comments)     Pt unable to state type of reaction. Information obtained from daughter who states she was informed of allergy from patient.    Penicillins      Other reaction(s): Hives, anaphylaxis    Sulfa (sulfonamide antibiotics) Rash and Hives     Current Facility-Administered Medications   Medication Frequency    acetaminophen tablet 650 mg Q4H PRN    albuterol-ipratropium 2.5 mg-0.5 mg/3 mL nebulizer solution 3 mL Q4H PRN    aluminum-magnesium hydroxide 200-200 mg/5 mL suspension 30 mL QID PRN    amLODIPine tablet 10 mg Daily    apixaban tablet 5 mg BID    atovaquone 750 mg/5 mL oral liquid 1,500 mg Daily    B-complex with vitamin C tablet 1 tablet Daily    bisacodyL suppository 10 mg Daily PRN    carvediloL tablet 12.5 mg BID    cloNIDine tablet 0.1 mg Q8H PRN    epoetin hira injection 4,000 Units Every Tues, Thurs, Sat    fluticasone propionate 50 mcg/actuation nasal spray 50 mcg BID    furosemide tablet 40 mg Daily    heparin (porcine) injection 1,000 Units PRN    hydrALAZINE tablet 50 mg Q8H    levothyroxine tablet 25 mcg Before breakfast    LIDOcaine 5 % patch 1 patch Q24H    magnesium oxide tablet 400 mg Daily    meclizine tablet 25 mg TID PRN    melatonin tablet 6 mg Nightly PRN    methocarbamoL tablet 500 mg TID PRN    naloxone 0.4 mg/mL injection 0.02 mg PRN    ondansetron injection 4 mg Q8H PRN    pantoprazole EC tablet 40 mg BID AC    predniSONE tablet 5 mg Daily    prochlorperazine injection Soln 5 mg Q6H PRN    quetiapine split tablet 12.5 mg QHS    senna-docusate 8.6-50 mg per tablet 1 tablet BID PRN    simethicone chewable tablet 80 mg QID PRN    sodium chloride 0.9% bolus 250 mL 250 mL PRN    sodium chloride 0.9% flush 10 mL PRN    sodium chloride 0.9% flush 10 mL Q6H PRN    sucralfate tablet 1 g TID PRN    tiZANidine tablet 4 mg BID PRN    traMADoL tablet 50 mg Q8H PRN    white petrolatum 41 % ointment Daily     Facility-Administered  Medications Ordered in Other Encounters   Medication Frequency    celecoxib capsule 400 mg Once    fentaNYL 50 mcg/mL injection  mcg PRN    LIDOcaine (PF) 10 mg/ml (1%) injection 10 mg Once PRN    LIDOcaine (PF) 10 mg/ml (1%) injection 10 mg Once    midazolam (VERSED) 1 mg/mL injection 0.5-4 mg PRN    ropivacaine 0.2% Nimbus PainPRO Pump infusion 500 ML Continuous       Objective:     Vital Signs (Most Recent):  Temp: 99.1 °F (37.3 °C) (01/31/23 0534)  Pulse: 74 (01/31/23 0707)  Resp: 18 (01/31/23 0534)  BP: (!) 111/56 (01/31/23 0534)  SpO2: (!) 94 % (01/31/23 0534)   Vital Signs (24h Range):  Temp:  [98.3 °F (36.8 °C)-99.1 °F (37.3 °C)] 99.1 °F (37.3 °C)  Pulse:  [74-83] 74  Resp:  [16-19] 18  SpO2:  [94 %-99 %] 94 %  BP: (101-135)/(52-79) 111/56     Weight: 59.6 kg (131 lb 6.3 oz) (01/30/23 2053)  Body mass index is 24.83 kg/m².  Body surface area is 1.6 meters squared.    I/O last 3 completed shifts:  In: 1570 [P.O.:1540; I.V.:30]  Out: -     Physical Exam  Vitals and nursing note reviewed.   Constitutional:       General: She is awake. She is not in acute distress.     Appearance: Normal appearance. She is overweight. She is not ill-appearing or diaphoretic.   HENT:      Head: Normocephalic and atraumatic.      Right Ear: External ear normal.      Left Ear: External ear normal.      Nose: Nose normal.      Mouth/Throat:      Mouth: Mucous membranes are moist.      Pharynx: Oropharynx is clear. No oropharyngeal exudate or posterior oropharyngeal erythema.   Eyes:      General: No scleral icterus.        Right eye: No discharge.         Left eye: No discharge.      Extraocular Movements: Extraocular movements intact.      Conjunctiva/sclera: Conjunctivae normal.   Cardiovascular:      Rate and Rhythm: Normal rate.      Heart sounds: Murmur heard.   Systolic murmur is present with a grade of 1/6.     No friction rub. No gallop.      Comments: Bilateral pitting lower extremity edema which extends  proximally.  Pulmonary:      Effort: Pulmonary effort is normal. No respiratory distress.      Breath sounds: No wheezing, rhonchi or rales.   Chest:      Comments: Tunneled HD catheters to right chest wall.   Abdominal:      General: Bowel sounds are normal. There is no distension.      Palpations: Abdomen is soft.      Tenderness: There is no abdominal tenderness.   Musculoskeletal:      Cervical back: Neck supple.      Right lower le+ Pitting Edema present.      Left lower le+ Pitting Edema present.   Skin:     General: Skin is warm and dry.      Capillary Refill: Capillary refill takes less than 2 seconds.      Coloration: Skin is not jaundiced.      Findings: No erythema.   Neurological:      General: No focal deficit present.      Mental Status: She is alert. Mental status is at baseline.      Cranial Nerves: No cranial nerve deficit.   Psychiatric:         Mood and Affect: Mood normal.         Behavior: Behavior normal. Behavior is cooperative.       Significant Labs:  BMP:   Recent Labs   Lab 23  0409 23  1148   GLU 86 127*    141   K 3.6 3.8    103   CO2 24 23   BUN 38* 52*   CREATININE 4.2* 5.3*   CALCIUM 8.7 8.3*   MG 1.9  --        CBC:   Recent Labs   Lab 23  0447 23  1148   WBC 12.24  --    RBC 2.80*  --    HGB 7.7* 7.7*   HCT 24.3*  --      --    MCV 87  --    MCH 27.5  --    MCHC 31.7*  --        CMP:   Recent Labs   Lab 23  1148   *   CALCIUM 8.3*   ALBUMIN 2.3*      K 3.8   CO2 23      BUN 52*   CREATININE 5.3*       Coagulation:   Recent Labs   Lab 23  0356   APTT 62.1*       Microbiology Results (last 7 days)       ** No results found for the last 168 hours. **          Specimen (24h ago, onward)      None          Significant Imaging:  I have reviewed all imagining in the last 24 hours.    Assessment/Plan:     * Acute renal failure on dialysis  Primary pauci-immune necrotizing and crescentic  glomerulonephritis  Microscopic polyangiitis  WILFREDO (acute kidney injury)  Miscroscopic polyangiitis with renal (biopsy proven pauci immune necrotizing & crescentic glomerulonephritis) and pulmonary involvement s/p Solumedrol 1,000 mg IV x3 doses, PLEX x5 sessions (10/26/2022, 10/27/2022, 10/29/2022, 10/30/2022, 11/01/2022, 11/02/2022, 11/03/2022), Rituximab 375 mg/m^2 x4 (600 mg) on 10/27/2022, 11/03/2922,11/10/2022,11/172022) with cyclophosphamide 7.5 mg/kg on 10/27/2022 and 11/10/2022 (every 14 days). Now iHD for which she receives HD on one but usually twice weekly for metabolic clearance via tunneled HD catheter roughly x2 a week with last HD Friday (01/06/2023).    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  2)  Necrotizing cresentic glomerulonephritis/[id number] necrotizing cresecentic glomerulonephritis.  3)  Focal acute pyelonephritis.  4)  Mild-to-moderate arterionephrosclerosis    Plan/Recommendations:  - no acute indications for HD today  - tentative plans to discharge today with HD planned on 2/2 at [facility]   - outpatient nephrologist patient closely for signs of renal recovery as she has previously only been dialyzed once to twice weekly and has only received two HD sessions while inpatient this admission in addition to immunosuppressive management    - can continue Lasix 40 mg PO daily for now  - daily RFPs and magnesium  - currently on prednisone 5 mg daily with atovaquone 1.5 grams faily for PJP prophylaxis   - renal diet if not NPO  - strict I/Os and daily weights  - renally dose all medications to eGFR  - avoid nephrotoxic agents when feasible (i.e. intra-arterial contrast, NSAIDs, supra-therapeutic vancomycin troughs, etc.)    Acute deep  vein thrombosis (DVT) of non-extremity vein - subclavian  - management per primary team  - transitioned to Eliquis on 1/26    Bacteremia due to Enterococcus  - management per ID and primary  - repeat blood cultures NGTD    Urinary tract infection due to extended-spectrum beta lactamase (ESBL) producing Escherichia coli  - Infectious Disease consulted and following  - management per primary team    Anemia due to chronic kidney disease  - goal hemoglobin 10-12,   - most recent iron panel with iron 15, transferrin 138, TIBC 204, 7% saturation and ferritin 378  - transfuse for hemoglobin < 7.0  - defer IV iron in light of recent bacteremia, currently receiving EPO 4,000 units SQ with HD    Primary hypertension  - management per primary team    Hypothyroid  - management per primary team    Thank you for your consult. I will follow-up with patient. Please contact us if you have any additional questions.    Pj Paz MD  Nephrology  [facility] ([place])

## 2023-01-31 NOTE — PLAN OF CARE
Problem: Adult Inpatient Plan of Care  Goal: Plan of Care Review  Outcome: Ongoing, Progressing  Goal: Patient-Specific Goal (Individualized)  Outcome: Ongoing, Progressing  Goal: Absence of Hospital-Acquired Illness or Injury  Outcome: Ongoing, Progressing  Goal: Optimal Comfort and Wellbeing  Outcome: Ongoing, Progressing  Goal: Readiness for Transition of Care  Outcome: Ongoing, Progressing     Problem: Infection  Goal: Absence of Infection Signs and Symptoms  Outcome: Ongoing, Progressing     Problem: Fluid and Electrolyte Imbalance (Acute Kidney Injury/Impairment)  Goal: Fluid and Electrolyte Balance  Outcome: Ongoing, Progressing     Problem: Skin Injury Risk Increased  Goal: Skin Health and Integrity  Outcome: Ongoing, Progressing

## 2023-01-31 NOTE — ASSESSMENT & PLAN NOTE
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 Now iHD for which she receives HD on one but usually twice weekly for metabolic clearance via tunneled HD catheter roughly x2 a week with last HD Friday (01/06/2023).    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  2)  Necrotizing cresentic glomerulonephritis/[id number] necrotizing cresecentic glomerulonephritis.  3)  Focal acute pyelonephritis.  4)  Mild-to-moderate arterionephrosclerosis    Plan/Recommendations:  - no acute indications for HD today  - tentative plans to discharge today with HD planned on 2/2 at [facility]   - outpatient nephrologist patient closely for signs of renal recovery as she has previously only been dialyzed once to twice weekly and has only received two HD sessions while inpatient this admission in addition to immunosuppressive management    - can continue Lasix 40 mg PO daily for now  - daily RFPs and magnesium  - currently on prednisone 5 mg daily with atovaquone 1.5 grams faily for PJP prophylaxis   - renal diet if not NPO  - strict I/Os and daily weights  - renally dose all medications to eGFR  - avoid nephrotoxic agents when feasible (i.e. intra-arterial contrast, NSAIDs, supra-therapeutic vancomycin troughs, etc.)

## 2023-01-31 NOTE — PROGRESS NOTES
"[facility] ([facility]-)  Adult Nutrition  Consult Note    SUMMARY     Recommendations    1) Continue renal diet   2) If intake <50% add ONS TID   3) RD following    Goals: meet % een/epn by next rd f/u  Nutrition Goal Status: progressing towards goal  Communication of RD Recs: other (comment) (POC)    Assessment and Plan    Nutrition Problem  Unintentional weight loss    Related to (etiology):   Physiological needs     Signs and Symptoms (as evidenced by):   10% wt loss in 8 weeks    Interventions/Recommendations (treatment strategy):  Collaboration with other providers    Nutrition Diagnosis Status:   New      Malnutrition Assessment      Weight Loss (Malnutrition): 7.5% in 3 months (10% wt loss in 1 month)       Reason for Assessment    Reason For Assessment: consult  Diagnosis: renal disease  Relevant Medical History: HTN, hypithyroidism, HFpEF, WILFREDO, pulmonary HTN, spetic shock  Interdisciplinary Rounds: did not attend  General Information Comments: Pt seen for LOS 19 days. Endorses good appetite, 100% intake of meals. Denies N/V/D/C or chewing/swallowing issues. States she's unsure of UBW. Endorses wt gain. BMI 24.83. Pt with malnutrition since 10/27 per past RD note (#). Noted with an additional 15# wt loss the past 8 weeks. (10%, significant). Pt appears nourished, however updated NFPE need at f/u. RD following.  Nutrition Discharge Planning: renal diet    Nutrition Risk Screen    Nutrition Risk Screen: no indicators present    Nutrition/Diet History    Patient Reported Diet/Restrictions/Preferences: low salt  Food Preferences: 2-3 meals/day  Spiritual, Cultural Beliefs, Uatsdin Practices, Values that Affect Care: no  Food Allergies: other (see comments) (peaches)    Anthropometrics    Temp: 98.3 °F (36.8 °C)  Height Method: Stated  Height: 5' 1" (154.9 cm)  Height (inches): 61 in  Weight Method: Bed Scale  Weight: 59.6 kg (131 lb 6.3 oz)  Weight (lb): 131.4 lb  Ideal Body " Weight (IBW), Female: 105 lb  % Ideal Body Weight, Female (lb): 125.14 %  BMI (Calculated): 24.8  BMI Grade: 25 - 29.9 - overweight  Usual Body Weight (UBW), k.18 kg  % Usual Body Weight: 87.6  % Weight Change From Usual Weight: -12.58 %       Lab/Procedures/Meds    Pertinent Labs Reviewed: reviewed  Pertinent Labs Comments: H/H: 7.7/24.3, MCHC: 31.7, iron: 20, transferrin: 103  Pertinent Medications Reviewed: reviewed  Pertinent Medications Comments: B-complex, carvedilol, levothyroxine, magnesium oxide, pantoprazole, prednisone      Estimated/Assessed Needs    Weight Used For Calorie Calculations: 59.6 kg (131 lb 6.3 oz)  Energy Calorie Requirements (kcal): 1209-8437 (25-30 kcal/kg)  Energy Need Method: Kcal/kg  Protein Requirements: 77 (1.3 g/kg)  Weight Used For Protein Calculations: 59.6 kg (131 lb 6.3 oz)        RDA Method (mL): 1490       Nutrition Prescription Ordered    Current Diet Order: Renal    Evaluation of Received Nutrient/Fluid Intake    % Kcal Needs: 100  % Protein Needs: 100  I/O: +4.2 l since 23  Energy Calories Required: meeting needs  Protein Required: meeting needs  Comments: LBM 1/30  % Intake of Estimated Energy Needs: 75 - 100 %  % Meal Intake: 75 - 100 %      Monitor and Evaluation    Food and Nutrient Intake: energy intake, food and beverage intake  Food and Nutrient Adminstration: diet order  Knowledge/Beliefs/Attitudes: food and nutrition knowledge/skill, beliefs and attitudes  Physical Activity and Function: nutrition-related ADLs and IADLs  Anthropometric Measurements: height/length, weight, weight change, body mass index  Biochemical Data, Medical Tests and Procedures: electrolyte and renal panel, gastrointestinal profile, glucose/endocrine profile, inflammatory profile, lipid profile  Nutrition-Focused Physical Findings: overall appearance       Nutrition Follow-Up    RD Follow-up?: Yes    Alem Bass, Registration Eligible, Provisional LDN

## 2023-01-31 NOTE — PLAN OF CARE
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]  Yes    Bacteremia due to Enterococcus [R78.81, B95.2]  Yes    Urinary tract infection due to extended-spectrum beta lactamase (ESBL) producing Escherichia coli [N39.0, B96.29, Z16.12]  Yes    Anemia due to chronic kidney disease [N18.9, D63.1]  Yes    Gastric reflux [K21.9]  Yes    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, 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]  Yes    Dysphagia [R13.10]  Yes    Microscopic polyangiitis [M31.7]  Yes    Impaired mobility [Z74.09]  Yes    Chronic diastolic heart failure [I50.32]  Yes    Syncope [R55]  Yes    Primary hypertension [I10]  Yes    Hypothyroid [E03.9]  Yes      Resolved Hospital Problems    Diagnosis Date Resolved POA    Discharge planning  [Z02.9] 01/20/2023 Not Applicable       Follow Up Appointments:  Future Appointments   Date Time Provider Department Center   2/2/2023 11:00 AM Lori Hernandez MD [facility] [place]   2/23/2023 11:00 AM Layne Aguirre MD [facility]       Allergies:  Review of patient's allergies indicates:   Allergen Reactions    Ampicillin     Peaches [peach (prunus persica)] Other (See Comments)     Pt unable to state type of reaction. Information obtained from daughter who states she was informed of allergy from patient.    Penicillins      Other reaction(s): Hives, anaphylaxis    Sulfa (sulfonamide antibiotics) Rash and  Hives       Medications: Review discharge medications with patient and family and provide education.    Current Facility-Administered Medications   Medication Dose Route Frequency Provider Last Rate Last Admin    acetaminophen tablet 650 mg  650 mg Oral Q4H PRN Kirby Fitzpatrick MD   650 mg at 01/27/23 0817    albuterol-ipratropium 2.5 mg-0.5 mg/3 mL nebulizer solution 3 mL  3 mL Nebulization Q4H PRN Kirby Fitzpatrick MD        aluminum-magnesium hydroxide 200-200 mg/5 mL suspension 30 mL  30 mL Oral QID PRN Kirby Fitzpatrick MD        amLODIPine tablet 10 mg  10 mg Oral Daily Paul Clayton MD   10 mg at 01/31/23 0818    apixaban tablet 5 mg  5 mg Oral BID Paul Clayton MD   5 mg at 01/31/23 0818    atovaquone 750 mg/5 mL oral liquid 1,500 mg  1,500 mg Oral Daily Kirby Fitzpatrick MD   1,500 mg at 01/31/23 0817    B-complex with vitamin C tablet 1 tablet  1 tablet Oral Daily Kirby Fitzpatrick MD   1 tablet at 01/31/23 0817    bisacodyL suppository 10 mg  10 mg Rectal Daily PRN Kirby Fitzpatrick MD        carvediloL tablet 12.5 mg  12.5 mg Oral BID Paul Clayton MD   12.5 mg at 01/31/23 0818    cloNIDine tablet 0.1 mg  0.1 mg Oral Q8H PRN Kirby Fitzpatrick MD   0.1 mg at 01/27/23 1811    epoetin hira injection 4,000 Units  4,000 Units Subcutaneous Every Tues, Thurs, Sat Nayeli Iyer MD   4,000 Units at 01/28/23 1739    fluticasone propionate 50 mcg/actuation nasal spray 50 mcg  1 spray Each Nostril BID Miguel Snider MD   50 mcg at 01/31/23 0819    furosemide tablet 40 mg  40 mg Oral Daily Kirby Fitzpatrick MD   40 mg at 01/31/23 0817    heparin (porcine) injection 1,000 Units  1,000 Units Intra-Catheter PRN Kirby Fitzpatrick MD   1,000 Units at 01/24/23 1103    hydrALAZINE tablet 50 mg  50 mg Oral Q8H Paul Clayton MD   50 mg at 01/31/23 0536    levothyroxine tablet 25 mcg  25 mcg Oral Before breakfast Kirby Fitzpatrick MD   25 mcg at 01/31/23 0536    LIDOcaine 5 % patch 1 patch  1 patch Transdermal  Q24H Nde Dewey DO   1 patch at 01/31/23 0447    magnesium oxide tablet 400 mg  400 mg Oral Daily Kirby Fitzpatrick MD   400 mg at 01/31/23 0900    meclizine tablet 25 mg  25 mg Oral TID PRN Kirby Fitzpatrick MD        melatonin tablet 6 mg  6 mg Oral Nightly PRN Kirby Fitzpatrick MD        methocarbamoL tablet 500 mg  500 mg Oral TID PRN Kirby Fitzpatrick MD        naloxone 0.4 mg/mL injection 0.02 mg  0.02 mg Intravenous PRN Kirby Fitzpatrick MD        ondansetron injection 4 mg  4 mg Intravenous Q8H PRN Kirby Fitzpatrick MD   4 mg at 01/21/23 1341    pantoprazole EC tablet 40 mg  40 mg Oral BID AC Kirby Fitzpatrick MD   40 mg at 01/31/23 0642    predniSONE tablet 5 mg  5 mg Oral Daily Kirby Fitzpatrick MD   5 mg at 01/31/23 0817    prochlorperazine injection Soln 5 mg  5 mg Intravenous Q6H PRN Kirby Fitzpatrick MD        quetiapine split tablet 12.5 mg  12.5 mg Oral QHS Kirby Fitzpatrick MD   12.5 mg at 01/30/23 2054    senna-docusate 8.6-50 mg per tablet 1 tablet  1 tablet Oral BID PRN Kirby Fitzpatrick MD   1 tablet at 01/22/23 1011    simethicone chewable tablet 80 mg  1 tablet Oral QID PRN Kirby Fitzpatrick MD        sodium chloride 0.9% bolus 250 mL 250 mL  250 mL Intravenous PRN Pj Paz MD        sodium chloride 0.9% flush 10 mL  10 mL Intravenous PRN Kirby Fitzpatrick MD        sodium chloride 0.9% flush 10 mL  10 mL Intravenous Q6H PRN Kirby Fitzpatrick MD   10 mL at 01/12/23 0556    sucralfate tablet 1 g  1 g Oral TID PRN Kirby Fitzpatrick MD        tiZANidine tablet 4 mg  4 mg Oral BID PRN Miguel Snider MD   4 mg at 01/26/23 0550    traMADoL tablet 50 mg  50 mg Oral Q8H PRN Ned Dewey DO   50 mg at 01/27/23 1811    white petrolatum 41 % ointment   Topical (Top) Daily Kirby Fitzpatrick MD   Given at 01/30/23 0900     Facility-Administered Medications Ordered in Other Encounters   Medication Dose Route Frequency Provider Last Rate Last Admin    celecoxib capsule 400 mg  400 mg Oral Once  Dieudonne Marshall MD        fentaNYL 50 mcg/mL injection  mcg   mcg Intravenous PRN Dieudonne Marshall MD   100 mcg at 12/21/21 0919    LIDOcaine (PF) 10 mg/ml (1%) injection 10 mg  1 mL Intradermal Once PRN Dieudonne Marshall MD        LIDOcaine (PF) 10 mg/ml (1%) injection 10 mg  1 mL Intradermal Once Dieudonne Marshall MD        midazolam (VERSED) 1 mg/mL injection 0.5-4 mg  0.5-4 mg Intravenous PRN Dieudonne Marshall MD   2 mg at 12/21/21 0919    ropivacaine 0.2% [facility] PainPRO Pump infusion 500 ML   Perineural Continuous Dieudonne Marshall MD   New Bag at 12/21/21 1153     Current Discharge Medication List        START taking these medications    Details   apixaban (ELIQUIS) 5 mg Tab Take 1 tablet (5 mg total) by mouth 2 (two) times daily.  Qty: 60 tablet, Refills: 0      atovaquone (MEPRON) 750 mg/5 mL Susp oral liquid Take 10 mLs (1,500 mg total) by mouth once daily.  Qty: 300 mL, Refills: 0      carvediloL (COREG) 12.5 MG tablet Take 1 tablet (12.5 mg total) by mouth 2 (two) times daily.  Qty: 60 tablet, Refills: 0    Comments: .      furosemide (LASIX) 40 MG tablet Take 1 tablet (40 mg total) by mouth once daily.  Qty: 30 tablet, Refills: 0      predniSONE (DELTASONE) 5 MG tablet Take 1 tablet (5 mg total) by mouth once daily.  Qty: 30 tablet, Refills: 0      QUEtiapine (SEROQUEL) 25 MG Tab Take 1 tablet (25 mg total) by mouth every evening.  Qty: 30 tablet, Refills: 0           CONTINUE these medications which have CHANGED    Details   hydrALAZINE (APRESOLINE) 50 MG tablet Take 1 tablet (50 mg total) by mouth every 8 (eight) hours.  Qty: 90 tablet, Refills: 0    Comments: .      levothyroxine (EUTHYROX) 25 MCG tablet Take 1 tablet (25 mcg total) by mouth once daily.  Qty: 30 tablet, Refills: 0    Associated Diagnoses: Acquired hypothyroidism      methocarbamoL (ROBAXIN) 500 MG Tab Take 1 tablet (500 mg total) by mouth 3 (three) times daily as needed (muscle spasms).  Qty: 30  tablet, Refills: 0    Associated Diagnoses: Chondromalacia of right knee; Acute medial meniscus tear of right knee, subsequent encounter           CONTINUE these medications which have NOT CHANGED    Details   ACETAMINOPHEN (TYLENOL ARTHRITIS PAIN ORAL) Take 1 tablet by mouth once daily.       albuterol (VENTOLIN HFA) 90 mcg/actuation inhaler Inhale 2 puffs into the lungs every 6 (six) hours as needed for Shortness of Breath. Rescue  Qty: 18 g, Refills: 0    Associated Diagnoses: CAREY (dyspnea on exertion)      amLODIPine (NORVASC) 10 MG tablet Take 1 tablet (10 mg total) by mouth once daily.  Qty: 90 tablet, Refills: 3    Comments: .      epinastine 0.05 % ophthalmic solution Place 1 drop into both eyes once daily.       fish,bora,flax oils-om3,6,9no1 (OMEGA 3-6-9) 1,200 mg Cap Take 1 each by mouth once daily.  Qty: 30 capsule, Refills: 3      fluticasone propionate (FLONASE) 50 mcg/actuation nasal spray 1 spray (50 mcg total) by Each Nostril route 2 (two) times daily.  Qty: 16 g, Refills: 0    Associated Diagnoses: Allergic rhinitis, unspecified seasonality, unspecified trigger      FLUZONE HIGHDOSE QUAD 20-21  mcg/0.7 mL Syrg ADM 0.7ML IM UTD      HYDROcodone-acetaminophen (NORCO) 5-325 mg per tablet Take 1 tablet by mouth every 6 (six) hours as needed.      levocetirizine (XYZAL) 5 MG tablet Take 1 tablet (5 mg total) by mouth every evening. For sinus  Qty: 30 tablet, Refills: 0    Associated Diagnoses: Allergic rhinitis, unspecified seasonality, unspecified trigger      meloxicam (MOBIC) 15 MG tablet Take 1 tablet (15 mg total) by mouth daily as needed (arthritis).  Qty: 30 tablet, Refills: 2    Associated Diagnoses: Hand arthritis; Capsulitis of left shoulder      mupirocin (BACTROBAN) 2 % ointment Apply topically 2 (two) times daily.           STOP taking these medications       aspirin 325 MG tablet Comments:   Reason for Stopping:         diclofenac sodium (VOLTAREN) 1 % Gel Comments:   Reason for  Stopping:         ibuprofen (ADVIL,MOTRIN) 800 MG tablet Comments:   Reason for Stopping:         lisinopriL (PRINIVIL,ZESTRIL) 40 MG tablet Comments:   Reason for Stopping:         metoprolol succinate (TOPROL-XL) 50 MG 24 hr tablet Comments:   Reason for Stopping:         tiZANidine (ZANAFLEX) 4 MG tablet Comments:   Reason for Stopping:                 I have seen and examined this patient within the last 30 days. My clinical findings that support the need for the home health skilled services and home bound status are the following:no   Weakness/numbness causing balance and gait disturbance due to Heart Failure, Infection, and Weakness/Debility making it taxing to leave home.  Medical restrictions requiring assistance of another human to leave home due to  Unstable ambulation.     Diet:   renal diet    Labs:  CBC and BMP 2/6/23    Referrals/ Consults  Physical Therapy to evaluate and treat. Evaluate for home safety and equipment needs; Establish/upgrade home exercise program. Perform / instruct on therapeutic exercises, gait training, transfer training, and Range of Motion.  Occupational Therapy to evaluate and treat. Evaluate home environment for safety and equipment needs. Perform/Instruct on transfers, ADL training, ROM, and therapeutic exercises.    Activities:   activity as tolerated    Nursing:   Agency to admit patient within 24 hours of hospital discharge unless specified on physician order or at patient request    SN to complete comprehensive assessment including routine vital signs. Instruct on disease process and s/s of complications to report to MD. Review/verify medication list sent home with the patient at time of discharge  and instruct patient/caregiver as needed. Frequency may be adjusted depending on start of care date.     Skilled nurse to perform up to 3 visits PRN for symptoms related to diagnosis    Notify MD if SBP > 160 or < 90; DBP > 90 or < 50; HR > 120 or < 50; Temp > 101; O2 < 88%;  Other:       Ok to schedule additional visits based on staff availability and patient request on consecutive days within the home health episode.    When multiple disciplines ordered:    Start of Care occurs on Sunday - Wednesday schedule remaining discipline evaluations as ordered on separate consecutive days following the start of care.    Thursday SOC -schedule subsequent evaluations Friday and Monday the following week.     Friday - Saturday SOC - schedule subsequent discipline evaluations on consecutive days starting Monday of the following week.    For all post-discharge communication and subsequent orders please contact patient's primary care physician.    Miscellaneous   N/a    Home Health Aide:  Physical Therapy Three times weekly and Occupational Therapy Three times weekly    Wound Care Orders  no    I certify that this patient is confined to her home and needs physical therapy and occupational therapy.

## 2023-01-31 NOTE — PLAN OF CARE
Received letter from [facility] Admission for scheduled chair time:    [phone]-[facility] @ 11:00a.m.  Pt is scheduled to start with [facility] on 2/2/2023 @ 11:00am    WYATT spoke to Yu manzano/ [phone] to confirm if HD chair was finalized.  Yu advised SW she is waiting for medical clearance from [facility].  WYATT awaiting a return call on HD chair set up.  Medical team notified.      WYATT also sent updated HH orders to [facility] [phone] to assign a HH provider.        01/31/23 0913   Post-Acute Status   Post-Acute Authorization Home Health;Dialysis;Placement   Post-Acute Placement Status Discharge Plan Changed   Home Health Status Pending Payor Review   Diaylsis Status   (AFD-[id number] approval from [facility] Medical team.)   Coverage [employer]   Discharge Delays (!) Dialysis Set-up   Discharge Plan   Discharge Plan A Home;Home with family;Home Health   Discharge Plan B Home       Indigo Brown LMSW  PRN-  [facility]  Ext. [phone]

## 2023-01-31 NOTE — PLAN OF CARE
Recommendations    1) Continue renal diet   2) If intake <50% add ONS TID   3) RD following    Goals: meet % een/epn by next rd f/u  Nutrition Goal Status: progressing towards goal  Communication of RD Recs: other (comment) (POC)

## 2023-01-31 NOTE — ASSESSMENT & PLAN NOTE
- management per primary team  - [facility]

## 2023-01-31 NOTE — SUBJECTIVE & OBJECTIVE
Interval History: Patient seen and examined. Afebrile overnight with pulse ranging from 80-70s bpm. Systolic blood pressures ranging from 130-110s mmHg. She is saturating +94% on room air with seven unmeasured voids in the last 24 hours. Tentative plan to be discharged today per primary team. No acute indications for RRT at this time.    Review of patient's allergies indicates:   Allergen Reactions    Ampicillin     Peaches [peach (prunus persica)] Other (See Comments)     Pt unable to state type of reaction. Information obtained from daughter who states she was informed of allergy from patient.    Penicillins      Other reaction(s): Hives, anaphylaxis    Sulfa (sulfonamide antibiotics) Rash and Hives     Current Facility-Administered Medications   Medication Frequency    acetaminophen tablet 650 mg Q4H PRN    albuterol-ipratropium 2.5 mg-0.5 mg/3 mL nebulizer solution 3 mL Q4H PRN    aluminum-magnesium hydroxide 200-200 mg/5 mL suspension 30 mL QID PRN    amLODIPine tablet 10 mg Daily    apixaban tablet 5 mg BID    atovaquone 750 mg/5 mL oral liquid 1,500 mg Daily    B-complex with vitamin C tablet 1 tablet Daily    bisacodyL suppository 10 mg Daily PRN    carvediloL tablet 12.5 mg BID    cloNIDine tablet 0.1 mg Q8H PRN    epoetin hira injection 4,000 Units Every Tues, Thurs, Sat    fluticasone propionate 50 mcg/actuation nasal spray 50 mcg BID    furosemide tablet 40 mg Daily    heparin (porcine) injection 1,000 Units PRN    hydrALAZINE tablet 50 mg Q8H    levothyroxine tablet 25 mcg Before breakfast    LIDOcaine 5 % patch 1 patch Q24H    magnesium oxide tablet 400 mg Daily    meclizine tablet 25 mg TID PRN    melatonin tablet 6 mg Nightly PRN    methocarbamoL tablet 500 mg TID PRN    naloxone 0.4 mg/mL injection 0.02 mg PRN    ondansetron injection 4 mg Q8H PRN    pantoprazole EC tablet 40 mg BID AC    predniSONE tablet 5 mg Daily    prochlorperazine injection Soln 5 mg Q6H PRN    quetiapine split tablet 12.5 mg  QHS    senna-docusate 8.6-50 mg per tablet 1 tablet BID PRN    simethicone chewable tablet 80 mg QID PRN    sodium chloride 0.9% bolus 250 mL 250 mL PRN    sodium chloride 0.9% flush 10 mL PRN    sodium chloride 0.9% flush 10 mL Q6H PRN    sucralfate tablet 1 g TID PRN    tiZANidine tablet 4 mg BID PRN    traMADoL tablet 50 mg Q8H PRN    white petrolatum 41 % ointment Daily     Facility-Administered Medications Ordered in Other Encounters   Medication Frequency    celecoxib capsule 400 mg Once    fentaNYL 50 mcg/mL injection  mcg PRN    LIDOcaine (PF) 10 mg/ml (1%) injection 10 mg Once PRN    LIDOcaine (PF) 10 mg/ml (1%) injection 10 mg Once    midazolam (VERSED) 1 mg/mL injection 0.5-4 mg PRN    ropivacaine 0.2% [facility] PainPRO Pump infusion 500 ML Continuous       Objective:     Vital Signs (Most Recent):  Temp: 99.1 °F (37.3 °C) (01/31/23 0534)  Pulse: 74 (01/31/23 0707)  Resp: 18 (01/31/23 0534)  BP: (!) 111/56 (01/31/23 0534)  SpO2: (!) 94 % (01/31/23 0534)   Vital Signs (24h Range):  Temp:  [98.3 °F (36.8 °C)-99.1 °F (37.3 °C)] 99.1 °F (37.3 °C)  Pulse:  [74-83] 74  Resp:  [16-19] 18  SpO2:  [94 %-99 %] 94 %  BP: (101-135)/(52-79) 111/56     Weight: 59.6 kg (131 lb 6.3 oz) (01/30/23 2053)  Body mass index is 24.83 kg/m².  Body surface area is 1.6 meters squared.    I/O last 3 completed shifts:  In: 1570 [P.O.:1540; I.V.:30]  Out: -     Physical Exam  Vitals and nursing note reviewed.   Constitutional:       General: She is awake. She is not in acute distress.     Appearance: Normal appearance. She is overweight. She is not ill-appearing or diaphoretic.   HENT:      Head: Normocephalic and atraumatic.      Right Ear: External ear normal.      Left Ear: External ear normal.      Nose: Nose normal.      Mouth/Throat:      Mouth: Mucous membranes are moist.      Pharynx: Oropharynx is clear. No oropharyngeal exudate or posterior oropharyngeal erythema.   Eyes:      General: No scleral icterus.        Right  eye: No discharge.         Left eye: No discharge.      Extraocular Movements: Extraocular movements intact.      Conjunctiva/sclera: Conjunctivae normal.   Cardiovascular:      Rate and Rhythm: Normal rate.      Heart sounds: Murmur heard.   Systolic murmur is present with a grade of 1/6.     No friction rub. No gallop.      Comments: Bilateral pitting lower extremity edema which extends proximally.  Pulmonary:      Effort: Pulmonary effort is normal. No respiratory distress.      Breath sounds: No wheezing, rhonchi or rales.   Chest:      Comments: Tunneled HD catheters to right chest wall.   Abdominal:      General: Bowel sounds are normal. There is no distension.      Palpations: Abdomen is soft.      Tenderness: There is no abdominal tenderness.   Musculoskeletal:      Cervical back: Neck supple.      Right lower le+ Pitting Edema present.      Left lower le+ Pitting Edema present.   Skin:     General: Skin is warm and dry.      Capillary Refill: Capillary refill takes less than 2 seconds.      Coloration: Skin is not jaundiced.      Findings: No erythema.   Neurological:      General: No focal deficit present.      Mental Status: She is alert. Mental status is at baseline.      Cranial Nerves: No cranial nerve deficit.   Psychiatric:         Mood and Affect: Mood normal.         Behavior: Behavior normal. Behavior is cooperative.       Significant Labs:  BMP:   Recent Labs   Lab 23  0409 23  1148   GLU 86 127*    141   K 3.6 3.8    103   CO2 24 23   BUN 38* 52*   CREATININE 4.2* 5.3*   CALCIUM 8.7 8.3*   MG 1.9  --        CBC:   Recent Labs   Lab 23  0447 23  1148   WBC 12.24  --    RBC 2.80*  --    HGB 7.7* 7.7*   HCT 24.3*  --      --    MCV 87  --    MCH 27.5  --    MCHC 31.7*  --        CMP:   Recent Labs   Lab 23  1148   *   CALCIUM 8.3*   ALBUMIN 2.3*      K 3.8   CO2 23      BUN 52*   CREATININE 5.3*       Coagulation:   Recent  Labs   Lab 01/29/23  0356   APTT 62.1*       Microbiology Results (last 7 days)       ** No results found for the last 168 hours. **          Specimen (24h ago, onward)      None          Significant Imaging:  I have reviewed all imagining in the last 24 hours.

## 2023-02-01 LAB
ABO + RH BLD: ABNORMAL
ANION GAP SERPL CALC-SCNC: 16 MMOL/L (ref 8–16)
BASOPHILS # BLD AUTO: 0.02 K/UL (ref 0–0.2)
BASOPHILS NFR BLD: 0.2 % (ref 0–1.9)
BLD GP AB SCN CELLS X3 SERPL QL: ABNORMAL
BLD GP AB SCN CELLS X3 SERPL QL: NORMAL
BUN SERPL-MCNC: 59 MG/DL (ref 8–23)
CALCIUM SERPL-MCNC: 8.6 MG/DL (ref 8.7–10.5)
CHLORIDE SERPL-SCNC: 103 MMOL/L (ref 95–110)
CO2 SERPL-SCNC: 21 MMOL/L (ref 23–29)
CREAT SERPL-MCNC: 5.8 MG/DL (ref 0.5–1.4)
DIFFERENTIAL METHOD: ABNORMAL
EOSINOPHIL # BLD AUTO: 0.1 K/UL (ref 0–0.5)
EOSINOPHIL NFR BLD: 0.6 % (ref 0–8)
ERYTHROCYTE [DISTWIDTH] IN BLOOD BY AUTOMATED COUNT: 17.1 % (ref 11.5–14.5)
EST. GFR  (NO RACE VARIABLE): 7.1 ML/MIN/1.73 M^2
GLUCOSE SERPL-MCNC: 63 MG/DL (ref 70–110)
HCT VFR BLD AUTO: 22.3 % (ref 37–48.5)
HGB BLD-MCNC: 6.3 G/DL (ref 12–16)
IMM GRANULOCYTES # BLD AUTO: 0.22 K/UL (ref 0–0.04)
IMM GRANULOCYTES NFR BLD AUTO: 1.9 % (ref 0–0.5)
LYMPHOCYTES # BLD AUTO: 3.6 K/UL (ref 1–4.8)
LYMPHOCYTES NFR BLD: 31.6 % (ref 18–48)
MCH RBC QN AUTO: 26.7 PG (ref 27–31)
MCHC RBC AUTO-ENTMCNC: 28.3 G/DL (ref 32–36)
MCV RBC AUTO: 95 FL (ref 82–98)
MONOCYTES # BLD AUTO: 1.5 K/UL (ref 0.3–1)
MONOCYTES NFR BLD: 13.2 % (ref 4–15)
NEUTROPHILS # BLD AUTO: 6 K/UL (ref 1.8–7.7)
NEUTROPHILS NFR BLD: 52.5 % (ref 38–73)
NRBC BLD-RTO: 0 /100 WBC
PLATELET # BLD AUTO: 407 K/UL (ref 150–450)
PMV BLD AUTO: 10 FL (ref 9.2–12.9)
POTASSIUM SERPL-SCNC: 4.4 MMOL/L (ref 3.5–5.1)
RBC # BLD AUTO: 2.36 M/UL (ref 4–5.4)
SODIUM SERPL-SCNC: 140 MMOL/L (ref 136–145)
WBC # BLD AUTO: 11.4 K/UL (ref 3.9–12.7)

## 2023-02-01 PROCEDURE — 25000003 PHARM REV CODE 250: Performed by: INTERNAL MEDICINE

## 2023-02-01 PROCEDURE — 85025 COMPLETE CBC W/AUTO DIFF WBC: CPT | Performed by: INTERNAL MEDICINE

## 2023-02-01 PROCEDURE — 99232 PR SUBSEQUENT HOSPITAL CARE,LEVL II: ICD-10-PCS | Mod: ,,, | Performed by: HOSPITALIST

## 2023-02-01 PROCEDURE — 36415 COLL VENOUS BLD VENIPUNCTURE: CPT | Performed by: INTERNAL MEDICINE

## 2023-02-01 PROCEDURE — 12000002 HC ACUTE/MED SURGE SEMI-PRIVATE ROOM

## 2023-02-01 PROCEDURE — 85025 COMPLETE CBC W/AUTO DIFF WBC: CPT | Mod: 91 | Performed by: HOSPITALIST

## 2023-02-01 PROCEDURE — 86850 RBC ANTIBODY SCREEN: CPT | Performed by: HOSPITALIST

## 2023-02-01 PROCEDURE — 94761 N-INVAS EAR/PLS OXIMETRY MLT: CPT

## 2023-02-01 PROCEDURE — 99232 SBSQ HOSP IP/OBS MODERATE 35: CPT | Mod: ,,, | Performed by: HOSPITALIST

## 2023-02-01 PROCEDURE — 36415 COLL VENOUS BLD VENIPUNCTURE: CPT | Performed by: HOSPITALIST

## 2023-02-01 PROCEDURE — 25000003 PHARM REV CODE 250: Performed by: HOSPITALIST

## 2023-02-01 PROCEDURE — 86900 BLOOD TYPING SEROLOGIC ABO: CPT | Performed by: HOSPITALIST

## 2023-02-01 PROCEDURE — 99233 PR SUBSEQUENT HOSPITAL CARE,LEVL III: ICD-10-PCS | Mod: ,,, | Performed by: INTERNAL MEDICINE

## 2023-02-01 PROCEDURE — 80048 BASIC METABOLIC PNL TOTAL CA: CPT | Performed by: INTERNAL MEDICINE

## 2023-02-01 PROCEDURE — 63600175 PHARM REV CODE 636 W HCPCS: Performed by: HOSPITALIST

## 2023-02-01 PROCEDURE — 99233 SBSQ HOSP IP/OBS HIGH 50: CPT | Mod: ,,, | Performed by: INTERNAL MEDICINE

## 2023-02-01 PROCEDURE — 86920 COMPATIBILITY TEST SPIN: CPT | Performed by: HOSPITALIST

## 2023-02-01 RX ORDER — SODIUM CHLORIDE 9 MG/ML
INJECTION, SOLUTION INTRAVENOUS ONCE
Status: DISCONTINUED | OUTPATIENT
Start: 2023-02-01 | End: 2023-02-03 | Stop reason: HOSPADM

## 2023-02-01 RX ORDER — HYDROCODONE BITARTRATE AND ACETAMINOPHEN 500; 5 MG/1; MG/1
TABLET ORAL
Status: DISCONTINUED | OUTPATIENT
Start: 2023-02-01 | End: 2023-02-03 | Stop reason: HOSPADM

## 2023-02-01 RX ORDER — HEPARIN SODIUM 1000 [USP'U]/ML
1000 INJECTION, SOLUTION INTRAVENOUS; SUBCUTANEOUS
Status: ACTIVE | OUTPATIENT
Start: 2023-02-02 | End: 2023-02-02

## 2023-02-01 RX ADMIN — WHITE PETROLATUM: 1.75 OINTMENT TOPICAL at 10:02

## 2023-02-01 RX ADMIN — PANTOPRAZOLE SODIUM 40 MG: 40 TABLET, DELAYED RELEASE ORAL at 06:02

## 2023-02-01 RX ADMIN — AMLODIPINE BESYLATE 10 MG: 10 TABLET ORAL at 09:02

## 2023-02-01 RX ADMIN — HYDRALAZINE HYDROCHLORIDE 50 MG: 25 TABLET, FILM COATED ORAL at 06:02

## 2023-02-01 RX ADMIN — MAGNESIUM OXIDE TAB 400 MG (241.3 MG ELEMENTAL MG) 400 MG: 400 (241.3 MG) TAB at 09:02

## 2023-02-01 RX ADMIN — PANTOPRAZOLE SODIUM 40 MG: 40 TABLET, DELAYED RELEASE ORAL at 04:02

## 2023-02-01 RX ADMIN — ATOVAQUONE 1500 MG: 750 SUSPENSION ORAL at 09:02

## 2023-02-01 RX ADMIN — FLUTICASONE PROPIONATE 50 MCG: 50 SPRAY, METERED NASAL at 09:02

## 2023-02-01 RX ADMIN — APIXABAN 5 MG: 5 TABLET, FILM COATED ORAL at 09:02

## 2023-02-01 RX ADMIN — CARVEDILOL 12.5 MG: 12.5 TABLET, FILM COATED ORAL at 09:02

## 2023-02-01 RX ADMIN — FUROSEMIDE 40 MG: 40 TABLET ORAL at 09:02

## 2023-02-01 RX ADMIN — Medication 1 TABLET: at 09:02

## 2023-02-01 RX ADMIN — LEVOTHYROXINE SODIUM 25 MCG: 25 TABLET ORAL at 06:02

## 2023-02-01 RX ADMIN — PREDNISONE 5 MG: 5 TABLET ORAL at 09:02

## 2023-02-01 RX ADMIN — QUETIAPINE FUMARATE 12.5 MG: 25 TABLET ORAL at 09:02

## 2023-02-01 RX ADMIN — LIDOCAINE 1 PATCH: 50 PATCH CUTANEOUS at 06:02

## 2023-02-01 RX ADMIN — FLUTICASONE PROPIONATE 50 MCG: 50 SPRAY, METERED NASAL at 10:02

## 2023-02-01 NOTE — PROGRESS NOTES
[facility] ([place] [address])  Nephrology  Progress Note    Patient Name: Kristin Goodman  MRN: [id number]  Admission Date: 1/9/2023  Hospital Length of Stay: 23 days  Attending Provider: Madie Lancaster MD   Primary Care Physician: Lori Hernandez MD  Principal Problem:Acute renal failure on dialysis    Subjective:     HPI: Ms Goodman is a 77-year-old woman with hypertension, hypothyroidism, HFpEF (most recent TTE with EF 65% with G1DD), pulmonary hypertension, current ESBL E. coli UTI, osteoarthritis, chronic back pain, DONAAVN and microscopic polyangiitis complicated base on biopsy from 10/26 by renal failure, GIB and respiratory failure with history of diffuse alveolar hemorrhage s/p IV Solumedrol, PLEX x5 sessions, Rituximab x4 doses and cyclophosphamide however now  just on steroid taper (cyclophosphamide appears to be hold secondary to anemia) now iHD dependent twice weekly via tunneled HD catheter for metabolic clearance (follows with Dr. Mojica with [facility]) who presented from [facility] for TDC removal in the setting of positive blood cultures growing Enterococcus faecalis and Bacillus species from cultures obtained on 1/5 & 1/7. In addition patient with reported syncopal event and sluggish flows on HD on 1/9 (incomplete session, daughter attributes to iron infusion) with last full session of iHD being on 1/6. She reports still making good urine without any urinary complaints today. Nephrology has been consulted for inpatient HD needs.      Interval History: Patient seen and examined. No acute events overnight. Afebrile overnight with pulse ranging from 90-60s bpm. Systolic blood pressures ranging from 140-130s mmHg. She is saturating +94% on room air with documented UOP of 900 mL in the last 24 hours. Receiving on unit pRBCs this morning for hemoglobin of 6.3. Will plan for HD tomorrow if remains inpatient.    Review of patient's allergies indicates:   Allergen Reactions     Ampicillin     Peaches [peach (prunus persica)] Other (See Comments)     Pt unable to state type of reaction. Information obtained from daughter who states she was informed of allergy from patient.    Penicillins      Other reaction(s): Hives, anaphylaxis    Sulfa (sulfonamide antibiotics) Rash and Hives     Current Facility-Administered Medications   Medication Frequency    acetaminophen tablet 650 mg Q4H PRN    albuterol-ipratropium 2.5 mg-0.5 mg/3 mL nebulizer solution 3 mL Q4H PRN    aluminum-magnesium hydroxide 200-200 mg/5 mL suspension 30 mL QID PRN    amLODIPine tablet 10 mg Daily    apixaban tablet 5 mg BID    atovaquone 750 mg/5 mL oral liquid 1,500 mg Daily    B-complex with vitamin C tablet 1 tablet Daily    bisacodyL suppository 10 mg Daily PRN    carvediloL tablet 12.5 mg BID    cloNIDine tablet 0.1 mg Q8H PRN    epoetin hira injection 4,000 Units Every Tues, Thurs, Sat    fluticasone propionate 50 mcg/actuation nasal spray 50 mcg BID    furosemide tablet 40 mg Daily    heparin (porcine) injection 1,000 Units PRN    hydrALAZINE tablet 50 mg Q8H    levothyroxine tablet 25 mcg Before breakfast    LIDOcaine 5 % patch 1 patch Q24H    loperamide capsule 2 mg QID PRN    magnesium oxide tablet 400 mg Daily    meclizine tablet 25 mg TID PRN    melatonin tablet 6 mg Nightly PRN    methocarbamoL tablet 500 mg TID PRN    naloxone 0.4 mg/mL injection 0.02 mg PRN    ondansetron injection 4 mg Q8H PRN    pantoprazole EC tablet 40 mg BID AC    predniSONE tablet 5 mg Daily    prochlorperazine injection Soln 5 mg Q6H PRN    quetiapine split tablet 12.5 mg QHS    senna-docusate 8.6-50 mg per tablet 1 tablet BID PRN    simethicone chewable tablet 80 mg QID PRN    sodium chloride 0.9% bolus 250 mL 250 mL PRN    sodium chloride 0.9% flush 10 mL PRN    sodium chloride 0.9% flush 10 mL Q6H PRN    sucralfate tablet 1 g TID PRN    tiZANidine tablet 4 mg BID PRN    traMADoL tablet 50 mg  Q8H PRN    white petrolatum 41 % ointment Daily     Facility-Administered Medications Ordered in Other Encounters   Medication Frequency    celecoxib capsule 400 mg Once    fentaNYL 50 mcg/mL injection  mcg PRN    LIDOcaine (PF) 10 mg/ml (1%) injection 10 mg Once PRN    LIDOcaine (PF) 10 mg/ml (1%) injection 10 mg Once    midazolam (VERSED) 1 mg/mL injection 0.5-4 mg PRN    ropivacaine 0.2% Nimbus PainPRO Pump infusion 500 ML Continuous       Objective:     Vital Signs (Most Recent):  Temp: 98.7 °F (37.1 °C) (02/01/23 0437)  Pulse: 72 (02/01/23 0746)  Resp: 18 (02/01/23 0437)  BP: 133/60 (02/01/23 0437)  SpO2: (!) 94 % (02/01/23 0437)   Vital Signs (24h Range):  Temp:  [98.3 °F (36.8 °C)-98.7 °F (37.1 °C)] 98.7 °F (37.1 °C)  Pulse:  [67-97] 72  Resp:  [18] 18  SpO2:  [94 %-97 %] 94 %  BP: (128-141)/(54-63) 133/60     Weight: 59.6 kg (131 lb 6.3 oz) (01/30/23 2053)  Body mass index is 24.83 kg/m².  Body surface area is 1.6 meters squared.    I/O last 3 completed shifts:  In: 1360 [P.O.:1340; I.V.:20]  Out: 900 [Urine:900]    Physical Exam  Vitals and nursing note reviewed.   Constitutional:       General: She is awake. She is not in acute distress.     Appearance: Normal appearance. She is overweight. She is not ill-appearing or diaphoretic.   HENT:      Head: Normocephalic and atraumatic.      Right Ear: External ear normal.      Left Ear: External ear normal.      Nose: Nose normal.      Mouth/Throat:      Mouth: Mucous membranes are moist.      Pharynx: Oropharynx is clear. No oropharyngeal exudate or posterior oropharyngeal erythema.   Eyes:      General: No scleral icterus.        Right eye: No discharge.         Left eye: No discharge.      Extraocular Movements: Extraocular movements intact.      Conjunctiva/sclera: Conjunctivae normal.   Cardiovascular:      Rate and Rhythm: Normal rate.      Heart sounds: Murmur heard.   Systolic murmur is present with a grade of 1/6.     No friction rub. No  gallop.      Comments: Bilateral pitting lower extremity edema which extends proximally.  Pulmonary:      Effort: Pulmonary effort is normal. No respiratory distress.      Breath sounds: No wheezing, rhonchi or rales.   Chest:      Comments: Tunneled HD catheters to right chest wall.   Abdominal:      General: Bowel sounds are normal. There is no distension.      Palpations: Abdomen is soft.      Tenderness: There is no abdominal tenderness.   Musculoskeletal:      Cervical back: Neck supple.      Right lower le+ Pitting Edema present.      Left lower le+ Pitting Edema present.   Skin:     General: Skin is warm and dry.      Capillary Refill: Capillary refill takes less than 2 seconds.      Coloration: Skin is not jaundiced.      Findings: No erythema.   Neurological:      General: No focal deficit present.      Mental Status: She is alert. Mental status is at baseline.      Cranial Nerves: No cranial nerve deficit.   Psychiatric:         Mood and Affect: Mood normal.         Behavior: Behavior normal. Behavior is cooperative.       Significant Labs:  BMP:   Recent Labs   Lab 23  0409 23  1148 23  0527   GLU 86   < > 63*      < > 140   K 3.6   < > 4.4      < > 103   CO2 24   < > 21*   BUN 38*   < > 59*   CREATININE 4.2*   < > 5.8*   CALCIUM 8.7   < > 8.6*   MG 1.9  --   --     < > = values in this interval not displayed.       CBC:   Recent Labs   Lab 23  0447 23  1148   WBC 12.24  --    RBC 2.80*  --    HGB 7.7* 7.7*   HCT 24.3*  --      --    MCV 87  --    MCH 27.5  --    MCHC 31.7*  --        CMP:   Recent Labs   Lab 23  0911 23  0527    63*   CALCIUM 9.0 8.6*   ALBUMIN 2.4*  --     140   K 3.9 4.4   CO2 25 21*    103   BUN 58* 59*   CREATININE 5.4* 5.8*       Coagulation:   Recent Labs   Lab 23  0356   APTT 62.1*       Microbiology Results (last 7 days)       ** No results found for the last 168 hours. **           Specimen (24h ago, onward)      None          Significant Imaging:  I have reviewed all imagining in the last 24 hours.    Assessment/Plan:     * Acute renal failure on dialysis  Primary pauci-immune necrotizing and crescentic glomerulonephritis  Microscopic polyangiitis  WILFREDO (acute kidney injury)  Miscroscopic polyangiitis with renal (biopsy proven pauci immune necrotizing & crescentic glomerulonephritis) and pulmonary involvement s/p Solumedrol 1,000 mg IV x3 doses, PLEX x5 sessions (10/26/2022, 10/27/2022, 10/29/2022, 10/30/2022, 11/01/2022, 11/02/2022, 11/03/2022), Rituximab 375 mg/m^2 x4 (600 mg) on 10/27/2022, 11/03/2922,11/10/2022,11/172022) with cyclophosphamide 7.5 mg/kg on 10/27/2022 and 11/10/2022 (every 14 days). Now iHD for which she receives HD on one but usually twice weekly for metabolic clearance via tunneled HD catheter roughly x2 a week with last HD Friday (01/06/2023).    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  2)  Necrotizing cresentic glomerulonephritis/[id number] necrotizing cresecentic glomerulonephritis.  3)  Focal acute pyelonephritis.  4)  Mild-to-moderate arterionephrosclerosis    Plan/Recommendations:  - no acute indications for HD today, will plan for HD tomorrow if remains inpatient   - outpatient nephrologist patient closely for signs of renal recovery as she has previously only been dialyzed once to twice weekly and has only received two HD sessions while inpatient this admission in addition to immunosuppressive management    - can continue Lasix 40 mg PO daily for now  - daily RFPs and magnesium  - currently on prednisone 5 mg daily with atovaquone 1.5 grams faily for PJP prophylaxis   - renal diet if not NPO  - strict I/Os and  daily weights  - renally dose all medications to eGFR  - avoid nephrotoxic agents when feasible (i.e. intra-arterial contrast, NSAIDs, supra-therapeutic vancomycin troughs, etc.)    Acute deep vein thrombosis (DVT) of non-extremity vein - subclavian  - management per primary team  - transitioned to Eliquis on 1/26    Bacteremia due to Enterococcus  - management per ID and primary  - repeat blood cultures NGTD    Urinary tract infection due to extended-spectrum beta lactamase (ESBL) producing Escherichia coli  - Infectious Disease consulted and following  - management per primary team    Anemia due to chronic kidney disease  - goal hemoglobin 10-12,   - most recent iron panel with iron 15, transferrin 138, TIBC 204, 7% saturation and ferritin 378  - transfuse for hemoglobin < 7.0  - defer IV iron in light of recent bacteremia, currently receiving EPO 4,000 units SQ with HD    Primary hypertension  - management per primary team    Hypothyroid  - management per primary team    Thank you for your consult. I will follow-up with patient. Please contact us if you have any additional questions.    Pj Paz MD  Nephrology  [facility] - [facility] ([facility]-)

## 2023-02-01 NOTE — PLAN OF CARE
WYATT in communication with Yu from [facility] [phone] or [phone] and was advised [facility]; [id number] Dr. Pham.  WYATT spoke to Leyda -Senior Coordinator w/ [facility] Admission [phone] in regards to HD chair time and location.  WYATT provided information for [id number].  Leyda advised SW someone will review clinicals and [facility].  SW awaiting a response to finalize HD.      Waiting for [facility] to assign a HH provider.  Medical team notified of above information.     WYATT spoke to pt's daughter, Roro [phone], and provided an update on HD chair and HH.       01/31/23 0913   Post-Acute Status   Post-Acute Authorization Home Health;Dialysis;Placement   Post-Acute Placement Status Discharge Plan Changed   Home Health Status Pending Payor Review   Diaylsis Status   ([id number] approval from [facility] Medical team.)   Coverage [facility] Managed Medicare   Discharge Delays (!) Dialysis Set-up   Discharge Plan   Discharge Plan A Home;Home with family;Home Health   Discharge Plan B Home       Indigo Brown LMSW  PRN-  [facility]  Ext. [phone]

## 2023-02-01 NOTE — PROGRESS NOTES
[facility] ([address] Medicine  Progress Note    Patient Name: Kristin Goodman  MRN: [id number]  Patient Class: IP- Inpatient   Admission Date: 1/9/2023  Length of Stay: 23 days  Attending Physician: Madie Lancaster MD  Primary Care Provider: Lori Hernandez MD        Subjective:     Principal Problem:Acute renal failure on dialysis        HPI:  77-year-old  woman with HTN, hypothyroidism, HFpEF, and osteoarthritis and new acute renal failure due to new diagnosis of Microscopic Polyangiitis s/p renal biopsy and requiring hemodialysis is transferred from [facility] for concerns of new bacteremia.     She was hospitalized from 10/17/22-12/13/22 then transferred to [facility].  Microscopic polyangiitis with pulmonary and renal involvement had suffered respiratory failure with diffuse alveolar hemorrhage, gi bleeding, and renal replacement therapy. S/p Rheumatology consultation and pulse IV steroids + plasmapheresis with Transfusion medicine started on Rituximab and Cyclophosphamide.  Continues on Steroid taper for immunosuppression.     More recently earlier this week noted to have a urine culture grow ESBL E.coli Cystitis, started on meropenem, then she had blood cultures from 1/5 and 1/7 that have grown Enterococcus (amp sensitive) vancomycin started today, patient had sluggish HD catheter with dialysis.   Also ED report that patient may have had syncope during HD today.     She is transferred for continued infectious workup and management as well as removal of tunneled HD line for line holiday.       Overview/Hospital Course:  Seen by ID and nephrology. Plan for line holiday and IR consulted.has been started on HD,pending out patient HD arrangement.  HH is lower today,will receive pack of RBC with HD.      Interval History: No acute events. Pt resting comfortably in bed, no concerns. Awaiting HD chair.     Review of Systems   Constitutional:  Positive for  activity change. Negative for fatigue and fever.   HENT:  Negative for sore throat and trouble swallowing.    Eyes:  Negative for visual disturbance.   Respiratory:  Negative for cough and shortness of breath.    Cardiovascular:  Negative for chest pain and leg swelling.   Gastrointestinal:  Negative for abdominal distention and abdominal pain.   Genitourinary:  Negative for difficulty urinating.   Musculoskeletal:  Negative for back pain and gait problem.   Skin:  Negative for color change.   Neurological:  Positive for weakness. Negative for dizziness, light-headedness and headaches.   Psychiatric/Behavioral:  Negative for agitation and confusion.    Objective:     Vital Signs (Most Recent):  Temp: 98 °F (36.7 °C) (02/01/23 1107)  Pulse: 65 (02/01/23 1144)  Resp: 18 (02/01/23 1107)  BP: (!) 117/56 (02/01/23 1107)  SpO2: 95 % (02/01/23 1107)   Vital Signs (24h Range):  Temp:  [98 °F (36.7 °C)-98.7 °F (37.1 °C)] 98 °F (36.7 °C)  Pulse:  [65-97] 65  Resp:  [18] 18  SpO2:  [93 %-97 %] 95 %  BP: (117-141)/(54-65) 117/56     Weight: 59.6 kg (131 lb 6.3 oz)  Body mass index is 24.83 kg/m².    Intake/Output Summary (Last 24 hours) at 2/1/2023 1152  Last data filed at 1/31/2023 1218  Gross per 24 hour   Intake 510 ml   Output 900 ml   Net -390 ml        Physical Exam  Vitals reviewed.   Constitutional:       General: She is not in acute distress.     Appearance: Normal appearance. She is not toxic-appearing.   HENT:      Head: Normocephalic and atraumatic.   Eyes:      General: No scleral icterus.  Cardiovascular:      Rate and Rhythm: Normal rate and regular rhythm.      Pulses: Normal pulses.      Heart sounds: No murmur heard.  Pulmonary:      Effort: Pulmonary effort is normal. No respiratory distress.      Breath sounds: No wheezing, rhonchi or rales.   Chest:      Comments: L TDC  Abdominal:      General: Bowel sounds are normal. There is no distension.      Tenderness: There is no abdominal tenderness. There is no  guarding.   Musculoskeletal:      Right lower leg: No edema.      Left lower leg: No edema.   Skin:     General: Skin is warm.   Neurological:      General: No focal deficit present.      Mental Status: She is alert and oriented to person, place, and time. Mental status is at baseline.   Psychiatric:         Behavior: Behavior normal.         Thought Content: Thought content normal.             Assessment/Plan:      * Acute renal failure on dialysis  -due to Microscopic Polyangiitis  - IR consulted. S/p permcath placement  on 1/17/23   - US of UE showed DVTs in R and L UE.   - CXR:The dialysis catheter remains in position  - S/p R TDC replacement on 01/23 by IR.  On 01/24, TDC was not working again for hemodialysis.  Consulted IR again.  S/p Left TDC placement on 1/25.   - Nephrology following. Patient will need HD chair set up prior to d/c.  -s/p Removal of R TDC  -Discharge planning. Awaiting HD chair    WILFREDO (acute kidney injury)        Acute deep vein thrombosis (DVT) of non-extremity vein - subclavian  Heparin drip started 1/19 pending procedures to re-establish HD access.  Switched to DOAC    Pauci-immune vasculitis  Continue steroid taper    Bacteremia due to Enterococcus  Positive blood cxs 1/5, 1/7, 1/9. Received 1 gm IV vancomycin at LTAC on 1/10.   HD tunelled cath removed 1/10.  Also has midline from LTAC - removed. Surface echo - no vegetation. ID consulted. Last surveillance bcxs 1/11/23 NGTD  Per ID - 2-weeks vanc from negative bcxs/line removal - end date 1/24. Completed.    Urinary tract infection due to extended-spectrum beta lactamase (ESBL) producing Escherichia coli  Urine culture from 1/05 with ESBL E.coli   -Ertapenem renal dosing 500mg IV q24 ordered, completed 5 days (1/5-1/10). Missed dose on 1/9 due to transfer to hospital    Anemia due to chronic kidney disease  Has required transfusions during recent inpatient/LTAC stays   -was receiving EPO infusion at nephrology direction.   Hb 6.7 on  01/16.  Ordered a unit of blood.  Consent obtained.  Continue to monitor CBC.  Goal for transfusion hemoglobin less than 7.  EPO during HD  Stable    Primary pauci-immune necrotizing and crescentic glomerulonephritis  Patient with acute kidney injury likely due to microscopic polyangiits with granulomatosis (MPA) WILFREDO is currently stable. Labs reviewed- Renal function/electrolytes with Estimated Creatinine Clearance: 7.5 mL/min (A) (based on SCr of 5.4 mg/dL (H)). according to latest data. Monitor urine output and serial BMP and adjust therapy as needed. Avoid nephrotoxins and renally dose meds for GFR listed above.  Had complex course with DAH, requiring pulse dose steroids, plasmapheresis and then rituximab and cyclophosphamide  -continue prednisone taper  -continue atovaquone for PJP ppx  -will need outpatient Rheum follow up    Gastric reflux  Continue BID PPI    Dysphagia  Continue renal diet   SLP following     Microscopic polyangiitis  -continue steroid taper   -patient is on OI prophylaxis with atovaquone 1500mg po daily  -continued on Protonix 40mg po bid while on glucocorticoids.     Impaired mobility  OT/PT     Chronic diastolic heart failure  Has preserved EF   -HD was primary volume maintenance   -continue Lasix 40mg po daily - consider higher or more frequent doses are required during her LIne holiday     Syncope  Report of possible syncope with HD,  Dizzy spells noted per LTAC notes - pt s/p MRI brain on 12/8 w/o acute findings   -neurology prior eval has recommended PT/OT for vestibular therapy  -Future outpatient cardiology visit for possible tilt-table eval.   -refer to neurology at discharge for BPPV follow up    Primary hypertension  Continue carvedilol  Add amlodipine  -home lisinopril is on hold due to her WILFREDO    Hypothyroid  Continue levothyroxine 25mcg       VTE Risk Mitigation (From admission, onward)         Ordered     heparin (porcine) injection 1,000 Units  As needed (PRN)          02/01/23 1124     apixaban tablet 5 mg  2 times daily         01/30/23 0919     heparin (porcine) injection 1,000 Units  As needed (PRN)         01/25/23 1128     heparin (porcine) injection 1,000 Units  As needed (PRN)         01/23/23 1321     heparin (porcine) injection 1,000 Units  As needed (PRN)         01/17/23 1421     heparin (porcine) injection 1,000 Units  As needed (PRN)         01/09/23 2330     Place sequential compression device  Until discontinued         01/09/23 2330                Discharge Planning   ANTHONY: 2/1/2023     Code Status: Full Code   Is the patient medically ready for discharge?: Yes    Reason for patient still in hospital (select all that apply): Patient trending condition  Discharge Plan A: Home, Home with family, Home Health   Discharge Delays: (!) Dialysis Set-up              Madie Lancaster MD  Department of Hospital Medicine   [facility] - Intensive Care ([place])

## 2023-02-01 NOTE — SUBJECTIVE & OBJECTIVE
Interval History: No acute events. Pt resting comfortably in bed, no concerns. Awaiting HD chair.     Review of Systems   Constitutional:  Positive for activity change. Negative for fatigue and fever.   HENT:  Negative for sore throat and trouble swallowing.    Eyes:  Negative for visual disturbance.   Respiratory:  Negative for cough and shortness of breath.    Cardiovascular:  Negative for chest pain and leg swelling.   Gastrointestinal:  Negative for abdominal distention and abdominal pain.   Genitourinary:  Negative for difficulty urinating.   Musculoskeletal:  Negative for back pain and gait problem.   Skin:  Negative for color change.   Neurological:  Positive for weakness. Negative for dizziness, light-headedness and headaches.   Psychiatric/Behavioral:  Negative for agitation and confusion.    Objective:     Vital Signs (Most Recent):  Temp: 98.3 °F (36.8 °C) (01/31/23 2120)  Pulse: 79 (01/31/23 2120)  Resp: 18 (01/31/23 2120)  BP: 130/60 (01/31/23 2120)  SpO2: 95 % (01/31/23 2120)   Vital Signs (24h Range):  Temp:  [98.3 °F (36.8 °C)-99.1 °F (37.3 °C)] 98.3 °F (36.8 °C)  Pulse:  [69-97] 79  Resp:  [18] 18  SpO2:  [94 %-97 %] 95 %  BP: (111-141)/(54-63) 130/60     Weight: 59.6 kg (131 lb 6.3 oz)  Body mass index is 24.83 kg/m².    Intake/Output Summary (Last 24 hours) at 1/31/2023 2230  Last data filed at 1/31/2023 1218  Gross per 24 hour   Intake 1120 ml   Output 900 ml   Net 220 ml        Physical Exam  Vitals reviewed.   Constitutional:       General: She is not in acute distress.     Appearance: Normal appearance. She is not toxic-appearing.   HENT:      Head: Normocephalic and atraumatic.   Eyes:      General: No scleral icterus.  Cardiovascular:      Rate and Rhythm: Normal rate and regular rhythm.      Pulses: Normal pulses.      Heart sounds: No murmur heard.  Pulmonary:      Effort: Pulmonary effort is normal. No respiratory distress.      Breath sounds: No wheezing, rhonchi or rales.   Chest:       Comments: L TDC  Abdominal:      General: Bowel sounds are normal. There is no distension.      Tenderness: There is no abdominal tenderness. There is no guarding.   Musculoskeletal:      Right lower leg: No edema.      Left lower leg: No edema.   Skin:     General: Skin is warm.   Neurological:      General: No focal deficit present.      Mental Status: She is alert and oriented to person, place, and time. Mental status is at baseline.   Psychiatric:         Behavior: Behavior normal.         Thought Content: Thought content normal.

## 2023-02-01 NOTE — PROGRESS NOTES
[facility] - [facility] ([address] Medicine  Progress Note    Patient Name: Kristin Goodman  MRN: [id number]  Patient Class: IP- Inpatient   Admission Date: 1/9/2023  Length of Stay: 22 days  Attending Physician: Paul Clayton MD  Primary Care Provider: Lori Hernandez MD        Subjective:     Principal Problem:Acute renal failure on dialysis        HPI:  77-year-old  woman with HTN, hypothyroidism, HFpEF, and osteoarthritis and new acute renal failure due to new diagnosis of Microscopic Polyangiitis s/p renal biopsy and requiring hemodialysis is transferred from [facility] for concerns of new bacteremia.     She was hospitalized from 10/17/22-12/13/22 then transferred to [facility].  Microscopic polyangiitis with pulmonary and renal involvement had suffered respiratory failure with diffuse alveolar hemorrhage, gi bleeding, and renal replacement therapy. S/p Rheumatology consultation and pulse IV steroids + plasmapheresis with Transfusion medicine started on Rituximab and Cyclophosphamide.  Continues on Steroid taper for immunosuppression.     More recently earlier this week noted to have a urine culture grow ESBL E.coli Cystitis, started on meropenem, then she had blood cultures from 1/5 and 1/7 that have grown Enterococcus (amp sensitive) vancomycin started today, patient had sluggish HD catheter with dialysis.   Also ED report that patient may have had syncope during HD today.     She is transferred for continued infectious workup and management as well as removal of tunneled HD line for line holiday.       Overview/Hospital Course:  Seen by ID and nephrology. Plan for line holiday and IR consulted.      Interval History: No acute events. Pt resting comfortably in bed, no concerns. Awaiting HD chair.     Review of Systems   Constitutional:  Positive for activity change. Negative for fatigue and fever.   HENT:  Negative for sore throat and trouble swallowing.    Eyes:   Negative for visual disturbance.   Respiratory:  Negative for cough and shortness of breath.    Cardiovascular:  Negative for chest pain and leg swelling.   Gastrointestinal:  Negative for abdominal distention and abdominal pain.   Genitourinary:  Negative for difficulty urinating.   Musculoskeletal:  Negative for back pain and gait problem.   Skin:  Negative for color change.   Neurological:  Positive for weakness. Negative for dizziness, light-headedness and headaches.   Psychiatric/Behavioral:  Negative for agitation and confusion.    Objective:     Vital Signs (Most Recent):  Temp: 98.3 °F (36.8 °C) (01/31/23 2120)  Pulse: 79 (01/31/23 2120)  Resp: 18 (01/31/23 2120)  BP: 130/60 (01/31/23 2120)  SpO2: 95 % (01/31/23 2120)   Vital Signs (24h Range):  Temp:  [98.3 °F (36.8 °C)-99.1 °F (37.3 °C)] 98.3 °F (36.8 °C)  Pulse:  [69-97] 79  Resp:  [18] 18  SpO2:  [94 %-97 %] 95 %  BP: (111-141)/(54-63) 130/60     Weight: 59.6 kg (131 lb 6.3 oz)  Body mass index is 24.83 kg/m².    Intake/Output Summary (Last 24 hours) at 1/31/2023 2230  Last data filed at 1/31/2023 1218  Gross per 24 hour   Intake 1120 ml   Output 900 ml   Net 220 ml        Physical Exam  Vitals reviewed.   Constitutional:       General: She is not in acute distress.     Appearance: Normal appearance. She is not toxic-appearing.   HENT:      Head: Normocephalic and atraumatic.   Eyes:      General: No scleral icterus.  Cardiovascular:      Rate and Rhythm: Normal rate and regular rhythm.      Pulses: Normal pulses.      Heart sounds: No murmur heard.  Pulmonary:      Effort: Pulmonary effort is normal. No respiratory distress.      Breath sounds: No wheezing, rhonchi or rales.   Chest:      Comments: L TDC  Abdominal:      General: Bowel sounds are normal. There is no distension.      Tenderness: There is no abdominal tenderness. There is no guarding.   Musculoskeletal:      Right lower leg: No edema.      Left lower leg: No edema.   Skin:     General:  Skin is warm.   Neurological:      General: No focal deficit present.      Mental Status: She is alert and oriented to person, place, and time. Mental status is at baseline.   Psychiatric:         Behavior: Behavior normal.         Thought Content: Thought content normal.             Assessment/Plan:      * Acute renal failure on dialysis  -due to Microscopic Polyangiitis  - IR consulted. S/p permcath placement  on 1/17/23   - US of UE showed DVTs in R and L UE.   - CXR:The dialysis catheter remains in position  - S/p R TDC replacement on 01/23 by IR.  On 01/24, TDC was not working again for hemodialysis.  Consulted IR again.  S/p Left TDC placement on 1/25.   - Nephrology following. Patient will need HD chair set up prior to d/c.  -s/p Removal of R TDC  -Discharge planning. Awaiting HD chair    WILFREDO (acute kidney injury)        Acute deep vein thrombosis (DVT) of non-extremity vein - subclavian  Heparin drip started 1/19 pending procedures to re-establish HD access.  Switched to DOAC    Pauci-immune vasculitis  Continue steroid taper    Bacteremia due to Enterococcus  Positive blood cxs 1/5, 1/7, 1/9. Received 1 gm IV vancomycin at LTAC on 1/10.   HD tunelled cath removed 1/10.  Also has midline from LTAC - removed. Surface echo - no vegetation. ID consulted. Last surveillance bcxs 1/11/23 NGTD  Per ID - 2-weeks vanc from negative bcxs/line removal - end date 1/24. Completed.    Urinary tract infection due to extended-spectrum beta lactamase (ESBL) producing Escherichia coli  Urine culture from 1/05 with ESBL E.coli   -Ertapenem renal dosing 500mg IV q24 ordered, completed 5 days (1/5-1/10). Missed dose on 1/9 due to transfer to hospital    Anemia due to chronic kidney disease  Has required transfusions during recent inpatient/LTAC stays   -was receiving EPO infusion at nephrology direction.   Hb 6.7 on 01/16.  Ordered a unit of blood.  Consent obtained.  Continue to monitor CBC.  Goal for transfusion hemoglobin  less than 7.  EPO during HD  Stable    Primary pauci-immune necrotizing and crescentic glomerulonephritis  Patient with acute kidney injury likely due to microscopic polyangiits with granulomatosis (MPA) WILFREDO is currently stable. Labs reviewed- Renal function/electrolytes with Estimated Creatinine Clearance: 7.5 mL/min (A) (based on SCr of 5.4 mg/dL (H)). according to latest data. Monitor urine output and serial BMP and adjust therapy as needed. Avoid nephrotoxins and renally dose meds for GFR listed above.  Had complex course with DAH, requiring pulse dose steroids, plasmapheresis and then rituximab and cyclophosphamide  -continue prednisone taper  -continue atovaquone for PJP ppx  -will need outpatient Rheum follow up    Gastric reflux  Continue BID PPI    Dysphagia  Continue renal diet   SLP following     Microscopic polyangiitis  -continue steroid taper   -patient is on OI prophylaxis with atovaquone 1500mg po daily  -continued on Protonix 40mg po bid while on glucocorticoids.     Impaired mobility  OT/PT     Chronic diastolic heart failure  Has preserved EF   -HD was primary volume maintenance   -continue Lasix 40mg po daily - consider higher or more frequent doses are required during her LIne holiday     Syncope  Report of possible syncope with HD,  Dizzy spells noted per LTAC notes - pt s/p MRI brain on 12/8 w/o acute findings   -neurology prior eval has recommended PT/OT for vestibular therapy  -Future outpatient cardiology visit for possible tilt-table eval.   -refer to neurology at discharge for BPPV follow up    Primary hypertension  Continue carvedilol  Add amlodipine  -home lisinopril is on hold due to her WILFREDO    Hypothyroid  Continue levothyroxine 25mcg       VTE Risk Mitigation (From admission, onward)         Ordered     apixaban tablet 5 mg  2 times daily         01/30/23 0919     heparin (porcine) injection 1,000 Units  As needed (PRN)         01/25/23 1128     heparin (porcine) injection 1,000  Units  As needed (PRN)         01/23/23 1321     heparin (porcine) injection 1,000 Units  As needed (PRN)         01/17/23 1421     heparin (porcine) injection 1,000 Units  As needed (PRN)         01/09/23 2330     Place sequential compression device  Until discontinued         01/09/23 2330                Discharge Planning   ANTHONY: 2/1/2023     Code Status: Full Code   Is the patient medically ready for discharge?: Yes    Reason for patient still in hospital (select all that apply): Pending disposition  Discharge Plan A: Home, Home with family, Home Health   Discharge Delays: (!) Dialysis Set-up              Paul Clayton MD  Department of Hospital Medicine   [facility] - Intensive Care ([place])

## 2023-02-01 NOTE — SUBJECTIVE & OBJECTIVE
Interval History: Patient seen and examined. No acute events overnight. Afebrile overnight with pulse ranging from 90-60s bpm. Systolic blood pressures ranging from 140-130s mmHg. She is saturating +94% on room air with documented UOP of 900 mL in the last 24 hours. Receiving on unit pRBCs this morning for hemoglobin of 6.3. Will plan for HD tomorrow if remains inpatient.    Review of patient's allergies indicates:   Allergen Reactions    Ampicillin     Peaches [peach (prunus persica)] Other (See Comments)     Pt unable to state type of reaction. Information obtained from daughter who states she was informed of allergy from patient.    Penicillins      Other reaction(s): Hives, anaphylaxis    Sulfa (sulfonamide antibiotics) Rash and Hives     Current Facility-Administered Medications   Medication Frequency    acetaminophen tablet 650 mg Q4H PRN    albuterol-ipratropium 2.5 mg-0.5 mg/3 mL nebulizer solution 3 mL Q4H PRN    aluminum-magnesium hydroxide 200-200 mg/5 mL suspension 30 mL QID PRN    amLODIPine tablet 10 mg Daily    apixaban tablet 5 mg BID    atovaquone 750 mg/5 mL oral liquid 1,500 mg Daily    B-complex with vitamin C tablet 1 tablet Daily    bisacodyL suppository 10 mg Daily PRN    carvediloL tablet 12.5 mg BID    cloNIDine tablet 0.1 mg Q8H PRN    epoetin hira injection 4,000 Units Every Tues, Thurs, Sat    fluticasone propionate 50 mcg/actuation nasal spray 50 mcg BID    furosemide tablet 40 mg Daily    heparin (porcine) injection 1,000 Units PRN    hydrALAZINE tablet 50 mg Q8H    levothyroxine tablet 25 mcg Before breakfast    LIDOcaine 5 % patch 1 patch Q24H    loperamide capsule 2 mg QID PRN    magnesium oxide tablet 400 mg Daily    meclizine tablet 25 mg TID PRN    melatonin tablet 6 mg Nightly PRN    methocarbamoL tablet 500 mg TID PRN    naloxone 0.4 mg/mL injection 0.02 mg PRN    ondansetron injection 4 mg Q8H PRN    pantoprazole EC tablet 40 mg BID AC    predniSONE tablet 5 mg Daily     prochlorperazine injection Soln 5 mg Q6H PRN    quetiapine split tablet 12.5 mg QHS    senna-docusate 8.6-50 mg per tablet 1 tablet BID PRN    simethicone chewable tablet 80 mg QID PRN    sodium chloride 0.9% bolus 250 mL 250 mL PRN    sodium chloride 0.9% flush 10 mL PRN    sodium chloride 0.9% flush 10 mL Q6H PRN    sucralfate tablet 1 g TID PRN    tiZANidine tablet 4 mg BID PRN    traMADoL tablet 50 mg Q8H PRN    white petrolatum 41 % ointment Daily     Facility-Administered Medications Ordered in Other Encounters   Medication Frequency    celecoxib capsule 400 mg Once    fentaNYL 50 mcg/mL injection  mcg PRN    LIDOcaine (PF) 10 mg/ml (1%) injection 10 mg Once PRN    LIDOcaine (PF) 10 mg/ml (1%) injection 10 mg Once    midazolam (VERSED) 1 mg/mL injection 0.5-4 mg PRN    ropivacaine 0.2% [facility] PainPRO Pump infusion 500 ML Continuous       Objective:     Vital Signs (Most Recent):  Temp: 98.7 °F (37.1 °C) (02/01/23 0437)  Pulse: 72 (02/01/23 0746)  Resp: 18 (02/01/23 0437)  BP: 133/60 (02/01/23 0437)  SpO2: (!) 94 % (02/01/23 0437)   Vital Signs (24h Range):  Temp:  [98.3 °F (36.8 °C)-98.7 °F (37.1 °C)] 98.7 °F (37.1 °C)  Pulse:  [67-97] 72  Resp:  [18] 18  SpO2:  [94 %-97 %] 94 %  BP: (128-141)/(54-63) 133/60     Weight: 59.6 kg (131 lb 6.3 oz) (01/30/23 2053)  Body mass index is 24.83 kg/m².  Body surface area is 1.6 meters squared.    I/O last 3 completed shifts:  In: 1360 [P.O.:1340; I.V.:20]  Out: 900 [Urine:900]    Physical Exam  Vitals and nursing note reviewed.   Constitutional:       General: She is awake. She is not in acute distress.     Appearance: Normal appearance. She is overweight. She is not ill-appearing or diaphoretic.   HENT:      Head: Normocephalic and atraumatic.      Right Ear: External ear normal.      Left Ear: External ear normal.      Nose: Nose normal.      Mouth/Throat:      Mouth: Mucous membranes are moist.      Pharynx: Oropharynx is clear. No oropharyngeal exudate or  posterior oropharyngeal erythema.   Eyes:      General: No scleral icterus.        Right eye: No discharge.         Left eye: No discharge.      Extraocular Movements: Extraocular movements intact.      Conjunctiva/sclera: Conjunctivae normal.   Cardiovascular:      Rate and Rhythm: Normal rate.      Heart sounds: Murmur heard.   Systolic murmur is present with a grade of 1/6.     No friction rub. No gallop.      Comments: Bilateral pitting lower extremity edema which extends proximally.  Pulmonary:      Effort: Pulmonary effort is normal. No respiratory distress.      Breath sounds: No wheezing, rhonchi or rales.   Chest:      Comments: Tunneled HD catheters to right chest wall.   Abdominal:      General: Bowel sounds are normal. There is no distension.      Palpations: Abdomen is soft.      Tenderness: There is no abdominal tenderness.   Musculoskeletal:      Cervical back: Neck supple.      Right lower le+ Pitting Edema present.      Left lower le+ Pitting Edema present.   Skin:     General: Skin is warm and dry.      Capillary Refill: Capillary refill takes less than 2 seconds.      Coloration: Skin is not jaundiced.      Findings: No erythema.   Neurological:      General: No focal deficit present.      Mental Status: She is alert. Mental status is at baseline.      Cranial Nerves: No cranial nerve deficit.   Psychiatric:         Mood and Affect: Mood normal.         Behavior: Behavior normal. Behavior is cooperative.       Significant Labs:  BMP:   Recent Labs   Lab 23  0409 23  1148 23  0527   GLU 86   < > 63*      < > 140   K 3.6   < > 4.4      < > 103   CO2 24   < > 21*   BUN 38*   < > 59*   CREATININE 4.2*   < > 5.8*   CALCIUM 8.7   < > 8.6*   MG 1.9  --   --     < > = values in this interval not displayed.       CBC:   Recent Labs   Lab 23  0447 23  1148   WBC 12.24  --    RBC 2.80*  --    HGB 7.7* 7.7*   HCT 24.3*  --      --    MCV 87  --    MCH  27.5  --    MCHC 31.7*  --        CMP:   Recent Labs   Lab 01/31/23  0911 02/01/23  0527    63*   CALCIUM 9.0 8.6*   ALBUMIN 2.4*  --     140   K 3.9 4.4   CO2 25 21*    103   BUN 58* 59*   CREATININE 5.4* 5.8*       Coagulation:   Recent Labs   Lab 01/29/23  0356   APTT 62.1*       Microbiology Results (last 7 days)       ** No results found for the last 168 hours. **          Specimen (24h ago, onward)      None          Significant Imaging:  I have reviewed all imagining in the last 24 hours.

## 2023-02-01 NOTE — PLAN OF CARE
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]  Yes    Bacteremia due to Enterococcus [R78.81, B95.2]  Yes    Urinary tract infection due to extended-spectrum beta lactamase (ESBL) producing Escherichia coli [N39.0, B96.29, Z16.12]  Yes    Anemia due to chronic kidney disease [N18.9, D63.1]  Yes    Gastric reflux [K21.9]  Yes    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, 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]  Yes    Dysphagia [R13.10]  Yes    Microscopic polyangiitis [M31.7]  Yes    Impaired mobility [Z74.09]  Yes    Chronic diastolic heart failure [I50.32]  Yes    Syncope [R55]  Yes    Primary hypertension [I10]  Yes    Hypothyroid [E03.9]  Yes      Resolved Hospital Problems    Diagnosis Date Resolved POA    Discharge planning  [Z02.9] 01/20/2023 Not Applicable       Follow Up Appointments:  Future Appointments   Date Time Provider Department Center   2/2/2023 11:00 AM Lori Hernandez MD [facility] [place]   2/23/2023 11:00 AM Layne Aguirre MD [facility]       Allergies:  Review of patient's allergies indicates:   Allergen Reactions    Ampicillin     Peaches [peach (prunus persica)] Other (See Comments)     Pt unable to state type of reaction. Information obtained from daughter who states she was informed of allergy from patient.    Penicillins      Other reaction(s): Hives, anaphylaxis    Sulfa (sulfonamide antibiotics) Rash and  Hives       Medications: Review discharge medications with patient and family and provide education.    Current Facility-Administered Medications   Medication Dose Route Frequency Provider Last Rate Last Admin    0.9%  NaCl infusion (for blood administration)   Intravenous Q24H PRN Madie Lancaster MD        0.9%  NaCl infusion   Intravenous Once Pj Paz MD        acetaminophen tablet 650 mg  650 mg Oral Q4H PRN Kirby Fitzpatrick MD   650 mg at 01/27/23 0817    albuterol-ipratropium 2.5 mg-0.5 mg/3 mL nebulizer solution 3 mL  3 mL Nebulization Q4H PRN Kirby Fitzpatrick MD        aluminum-magnesium hydroxide 200-200 mg/5 mL suspension 30 mL  30 mL Oral QID PRN Kirby Fitzpatrick MD        amLODIPine tablet 10 mg  10 mg Oral Daily Paul Clayton MD   10 mg at 02/01/23 0934    apixaban tablet 5 mg  5 mg Oral BID Paul Clayton MD   5 mg at 02/01/23 0934    atovaquone 750 mg/5 mL oral liquid 1,500 mg  1,500 mg Oral Daily Kirby Fitzpatrick MD   1,500 mg at 02/01/23 0935    B-complex with vitamin C tablet 1 tablet  1 tablet Oral Daily Kirby Fitzpatrick MD   1 tablet at 02/01/23 0935    bisacodyL suppository 10 mg  10 mg Rectal Daily PRN Kirby Fitzpatrick MD        carvediloL tablet 12.5 mg  12.5 mg Oral BID Paul Clayton MD   12.5 mg at 02/01/23 0934    cloNIDine tablet 0.1 mg  0.1 mg Oral Q8H PRN Kirby Fitzpatrick MD   0.1 mg at 01/27/23 1811    epoetin hira injection 4,000 Units  4,000 Units Subcutaneous Every Tues, Thurs, Sat Nayeli Iyer MD   4,000 Units at 01/31/23 1026    fluticasone propionate 50 mcg/actuation nasal spray 50 mcg  1 spray Each Nostril BID Miguel Snider MD   50 mcg at 02/01/23 1035    furosemide tablet 40 mg  40 mg Oral Daily Kirby Fitzpatrick MD   40 mg at 02/01/23 0934    heparin (porcine) injection 1,000 Units  1,000 Units Intra-Catheter PRN Kirby Fitzpatrick MD   1,000 Units at 01/24/23 1103    [START ON 2/2/2023] heparin (porcine) injection 1,000 Units  1,000 Units  Intra-Catheter PRN Pj Paz MD        hydrALAZINE tablet 50 mg  50 mg Oral Q8H Paul Clayton MD   50 mg at 02/01/23 0601    levothyroxine tablet 25 mcg  25 mcg Oral Before breakfast Kirby Fitzpatrick MD   25 mcg at 02/01/23 0601    LIDOcaine 5 % patch 1 patch  1 patch Transdermal Q24H Ned Dewey DO   1 patch at 02/01/23 0601    loperamide capsule 2 mg  2 mg Oral QID PRN Paul Clayton MD   2 mg at 01/31/23 1504    magnesium oxide tablet 400 mg  400 mg Oral Daily Kirby Fitzpatrick MD   400 mg at 02/01/23 0935    meclizine tablet 25 mg  25 mg Oral TID PRN Kirby Fitzpatrick MD        melatonin tablet 6 mg  6 mg Oral Nightly PRN Kirby Fitzpatrick MD        methocarbamoL tablet 500 mg  500 mg Oral TID PRN Kirby Fitzpatrick MD        naloxone 0.4 mg/mL injection 0.02 mg  0.02 mg Intravenous PRN Kirby Fitzpatrick MD        ondansetron injection 4 mg  4 mg Intravenous Q8H PRN Kirby Fitzpatrick MD   4 mg at 01/21/23 1341    pantoprazole EC tablet 40 mg  40 mg Oral BID AC Kirby Fitzpatrick MD   40 mg at 02/01/23 0602    predniSONE tablet 5 mg  5 mg Oral Daily Kirby Fitzpatrick MD   5 mg at 02/01/23 0935    prochlorperazine injection Soln 5 mg  5 mg Intravenous Q6H PRN Kirby Fitzpatrick MD        quetiapine split tablet 12.5 mg  12.5 mg Oral QHS Kirby Fitzpatrick MD   12.5 mg at 01/31/23 2121    senna-docusate 8.6-50 mg per tablet 1 tablet  1 tablet Oral BID PRN Kirby Fitzpatrick MD   1 tablet at 01/22/23 1011    simethicone chewable tablet 80 mg  1 tablet Oral QID PRN Kirby Fitzpatrick MD        sodium chloride 0.9% bolus 250 mL 250 mL  250 mL Intravenous PRN Pj Paz MD        [START ON 2/2/2023] sodium chloride 0.9% bolus 250 mL 250 mL  250 mL Intravenous PRN Pj Paz MD        sodium chloride 0.9% flush 10 mL  10 mL Intravenous PRN Kirby Fitzpatrick MD        sodium chloride 0.9% flush 10 mL  10 mL Intravenous Q6H PRN Kirby Fitzpatrick MD   10 mL at 01/12/23 0556    sucralfate tablet 1  g  1 g Oral TID PRN Kirby Fitzpatrick MD        tiZANidine tablet 4 mg  4 mg Oral BID PRN Miguel Snider MD   4 mg at 01/26/23 0550    traMADoL tablet 50 mg  50 mg Oral Q8H PRN Ned Dewey DO   50 mg at 01/27/23 1811    white petrolatum 41 % ointment   Topical (Top) Daily Kirby Fitzpatrick MD   Given at 02/01/23 1036     Facility-Administered Medications Ordered in Other Encounters   Medication Dose Route Frequency Provider Last Rate Last Admin    celecoxib capsule 400 mg  400 mg Oral Once Dieudonne Marshall MD        fentaNYL 50 mcg/mL injection  mcg   mcg Intravenous PRN Dieudonne Marshall MD   100 mcg at 12/21/21 0919    LIDOcaine (PF) 10 mg/ml (1%) injection 10 mg  1 mL Intradermal Once PRN Dieudonne Marshall MD        LIDOcaine (PF) 10 mg/ml (1%) injection 10 mg  1 mL Intradermal Once Dieudonne Marshall MD        midazolam (VERSED) 1 mg/mL injection 0.5-4 mg  0.5-4 mg Intravenous PRN Dieudonne Marshall MD   2 mg at 12/21/21 0919    ropivacaine 0.2% [facility] PainPRO Pump infusion 500 ML   Perineural Continuous Dieudonne Marshall MD   New Bag at 12/21/21 1153     Current Discharge Medication List        START taking these medications    Details   apixaban (ELIQUIS) 5 mg Tab Take 1 tablet (5 mg total) by mouth 2 (two) times daily.  Qty: 60 tablet, Refills: 0      atovaquone (MEPRON) 750 mg/5 mL Susp oral liquid Take 10 mLs (1,500 mg total) by mouth once daily.  Qty: 300 mL, Refills: 0      carvediloL (COREG) 12.5 MG tablet Take 1 tablet (12.5 mg total) by mouth 2 (two) times daily.  Qty: 60 tablet, Refills: 0    Comments: .      furosemide (LASIX) 40 MG tablet Take 1 tablet (40 mg total) by mouth once daily.  Qty: 30 tablet, Refills: 0      predniSONE (DELTASONE) 5 MG tablet Take 1 tablet (5 mg total) by mouth once daily.  Qty: 30 tablet, Refills: 0      QUEtiapine (SEROQUEL) 25 MG Tab Take 1 tablet (25 mg total) by mouth every evening.  Qty: 30 tablet, Refills: 0           CONTINUE these  medications which have CHANGED    Details   hydrALAZINE (APRESOLINE) 50 MG tablet Take 1 tablet (50 mg total) by mouth every 8 (eight) hours.  Qty: 90 tablet, Refills: 0    Comments: .      levothyroxine (EUTHYROX) 25 MCG tablet Take 1 tablet (25 mcg total) by mouth once daily.  Qty: 30 tablet, Refills: 0    Associated Diagnoses: Acquired hypothyroidism      methocarbamoL (ROBAXIN) 500 MG Tab Take 1 tablet (500 mg total) by mouth 3 (three) times daily as needed (muscle spasms).  Qty: 30 tablet, Refills: 0    Associated Diagnoses: Chondromalacia of right knee; Acute medial meniscus tear of right knee, subsequent encounter           CONTINUE these medications which have NOT CHANGED    Details   ACETAMINOPHEN (TYLENOL ARTHRITIS PAIN ORAL) Take 1 tablet by mouth once daily.       albuterol (VENTOLIN HFA) 90 mcg/actuation inhaler Inhale 2 puffs into the lungs every 6 (six) hours as needed for Shortness of Breath. Rescue  Qty: 18 g, Refills: 0    Associated Diagnoses: CAREY (dyspnea on exertion)      amLODIPine (NORVASC) 10 MG tablet Take 1 tablet (10 mg total) by mouth once daily.  Qty: 90 tablet, Refills: 3    Comments: .      epinastine 0.05 % ophthalmic solution Place 1 drop into both eyes once daily.       fish,bora,flax oils-om3,6,9no1 (OMEGA 3-6-9) 1,200 mg Cap Take 1 each by mouth once daily.  Qty: 30 capsule, Refills: 3      fluticasone propionate (FLONASE) 50 mcg/actuation nasal spray 1 spray (50 mcg total) by Each Nostril route 2 (two) times daily.  Qty: 16 g, Refills: 0    Associated Diagnoses: Allergic rhinitis, unspecified seasonality, unspecified trigger      FLUZONE HIGHDOSE QUAD 20-21  mcg/0.7 mL Syrg ADM 0.7ML IM UTD      HYDROcodone-acetaminophen (NORCO) 5-325 mg per tablet Take 1 tablet by mouth every 6 (six) hours as needed.      levocetirizine (XYZAL) 5 MG tablet Take 1 tablet (5 mg total) by mouth every evening. For sinus  Qty: 30 tablet, Refills: 0    Associated Diagnoses: Allergic rhinitis,  unspecified seasonality, unspecified trigger      meloxicam (MOBIC) 15 MG tablet Take 1 tablet (15 mg total) by mouth daily as needed (arthritis).  Qty: 30 tablet, Refills: 2    Associated Diagnoses: Hand arthritis; Capsulitis of left shoulder      mupirocin (BACTROBAN) 2 % ointment Apply topically 2 (two) times daily.           STOP taking these medications       aspirin 325 MG tablet Comments:   Reason for Stopping:         diclofenac sodium (VOLTAREN) 1 % Gel Comments:   Reason for Stopping:         ibuprofen (ADVIL,MOTRIN) 800 MG tablet Comments:   Reason for Stopping:         lisinopriL (PRINIVIL,ZESTRIL) 40 MG tablet Comments:   Reason for Stopping:         metoprolol succinate (TOPROL-XL) 50 MG 24 hr tablet Comments:   Reason for Stopping:         tiZANidine (ZANAFLEX) 4 MG tablet Comments:   Reason for Stopping:                 I have seen and examined this patient within the last 30 days. My clinical findings that support the need for the home health skilled services and home bound status are the following:no   Weakness/numbness causing balance and gait disturbance due to Weakness/Debility making it taxing to leave home.     Diet:   renal diet          Referrals/ Consults  Physical Therapy to evaluate and treat. Evaluate for home safety and equipment needs; Establish/upgrade home exercise program. Perform / instruct on therapeutic exercises, gait training, transfer training, and Range of Motion.  Occupational Therapy to evaluate and treat. Evaluate home environment for safety and equipment needs. Perform/Instruct on transfers, ADL training, ROM, and therapeutic exercises.    Activities:   activity as tolerated    Nursing:   Agency to admit patient within 24 hours of hospital discharge unless specified on physician order or at patient request    SN to complete comprehensive assessment including routine vital signs. Instruct on disease process and s/s of complications to report to MD. Review/verify medication  list sent home with the patient at time of discharge  and instruct patient/caregiver as needed. Frequency may be adjusted depending on start of care date.     Skilled nurse to perform up to 3 visits PRN for symptoms related to diagnosis    Notify MD if SBP > 160 or < 90; DBP > 90 or < 50; HR > 120 or < 50; Temp > 101; O2 < 88%; Other:       Ok to schedule additional visits based on staff availability and patient request on consecutive days within the home health episode.    When multiple disciplines ordered:    Start of Care occurs on Sunday - Wednesday schedule remaining discipline evaluations as ordered on separate consecutive days following the start of care.    Thursday SOC -schedule subsequent evaluations Friday and Monday the following week.     Friday - Saturday SOC - schedule subsequent discipline evaluations on consecutive days starting Monday of the following week.    For all post-discharge communication and subsequent orders please contact patient's primary care physician. If unable to reach primary care physician or do not receive response within 30 minutes, please contact  [facility]  for clinical staff order clarification    Miscellaneous   Routine Skin for Bedridden Patients: Instruct patient/caregiver to apply moisture barrier cream to all skin folds and wet areas in perineal area daily and after baths and all bowel movements.    Home Health Aide:  Nursing Twice weekly, Physical Therapy Twice weekly, and Occupational Therapy Twice weekly    Wound Care Orders  no    I certify that this patient is confined to her home and needs intermittent skilled nursing care, physical therapy, and occupational therapy.

## 2023-02-01 NOTE — PROGRESS NOTES
Dialysis scheduled for tomorrow.  MD notified and gave the okay to admin blood transfusion tomorrow with HD.

## 2023-02-01 NOTE — ASSESSMENT & PLAN NOTE
Patient with acute kidney injury likely due to microscopic polyangiits with granulomatosis (MPA) WILFREDO is currently stable. Labs reviewed- Renal function/electrolytes with Estimated Creatinine Clearance: 7.5 mL/min (A) (based on SCr of 5.4 mg/dL (H)). according to latest data. Monitor urine output and serial BMP and adjust therapy as needed. Avoid nephrotoxins and renally dose meds for GFR listed above.  Had complex course with DAH, requiring pulse dose steroids, plasmapheresis and then rituximab and cyclophosphamide  -continue prednisone taper  -continue atovaquone for PJP ppx  -will need outpatient Rheum follow up

## 2023-02-01 NOTE — SUBJECTIVE & OBJECTIVE
Interval History: No acute events. Pt resting comfortably in bed, no concerns. Awaiting HD chair.     Review of Systems   Constitutional:  Positive for activity change. Negative for fatigue and fever.   HENT:  Negative for sore throat and trouble swallowing.    Eyes:  Negative for visual disturbance.   Respiratory:  Negative for cough and shortness of breath.    Cardiovascular:  Negative for chest pain and leg swelling.   Gastrointestinal:  Negative for abdominal distention and abdominal pain.   Genitourinary:  Negative for difficulty urinating.   Musculoskeletal:  Negative for back pain and gait problem.   Skin:  Negative for color change.   Neurological:  Positive for weakness. Negative for dizziness, light-headedness and headaches.   Psychiatric/Behavioral:  Negative for agitation and confusion.    Objective:     Vital Signs (Most Recent):  Temp: 98 °F (36.7 °C) (02/01/23 1107)  Pulse: 65 (02/01/23 1144)  Resp: 18 (02/01/23 1107)  BP: (!) 117/56 (02/01/23 1107)  SpO2: 95 % (02/01/23 1107)   Vital Signs (24h Range):  Temp:  [98 °F (36.7 °C)-98.7 °F (37.1 °C)] 98 °F (36.7 °C)  Pulse:  [65-97] 65  Resp:  [18] 18  SpO2:  [93 %-97 %] 95 %  BP: (117-141)/(54-65) 117/56     Weight: 59.6 kg (131 lb 6.3 oz)  Body mass index is 24.83 kg/m².    Intake/Output Summary (Last 24 hours) at 2/1/2023 1152  Last data filed at 1/31/2023 1218  Gross per 24 hour   Intake 510 ml   Output 900 ml   Net -390 ml        Physical Exam  Vitals reviewed.   Constitutional:       General: She is not in acute distress.     Appearance: Normal appearance. She is not toxic-appearing.   HENT:      Head: Normocephalic and atraumatic.   Eyes:      General: No scleral icterus.  Cardiovascular:      Rate and Rhythm: Normal rate and regular rhythm.      Pulses: Normal pulses.      Heart sounds: No murmur heard.  Pulmonary:      Effort: Pulmonary effort is normal. No respiratory distress.      Breath sounds: No wheezing, rhonchi or rales.   Chest:       Comments: L TDC  Abdominal:      General: Bowel sounds are normal. There is no distension.      Tenderness: There is no abdominal tenderness. There is no guarding.   Musculoskeletal:      Right lower leg: No edema.      Left lower leg: No edema.   Skin:     General: Skin is warm.   Neurological:      General: No focal deficit present.      Mental Status: She is alert and oriented to person, place, and time. Mental status is at baseline.   Psychiatric:         Behavior: Behavior normal.         Thought Content: Thought content normal.

## 2023-02-01 NOTE — ASSESSMENT & PLAN NOTE
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 Now iHD for which she receives HD on one but usually twice weekly for metabolic clearance via tunneled HD catheter roughly x2 a week with last HD Friday (01/06/2023).    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  3)  Focal acute pyelonephritis.  4)  Mild-to-moderate arterionephrosclerosis    Plan/Recommendations:  - no acute indications for HD today, will plan for HD tomorrow if remains inpatient   - outpatient nephrologist patient closely for signs of renal recovery as she has previously only been dialyzed once to twice weekly and has only received two HD sessions while inpatient this admission in addition to immunosuppressive management    - can continue Lasix 40 mg PO daily for now  - daily RFPs and magnesium  - currently on prednisone 5 mg daily with atovaquone 1.5 grams faily for PJP prophylaxis   - renal diet if not NPO  - strict I/Os and daily weights  - renally dose all medications to eGFR  - avoid nephrotoxic agents when feasible (i.e. intra-arterial contrast, NSAIDs, supra-therapeutic vancomycin troughs, etc.)

## 2023-02-02 LAB
ALBUMIN SERPL BCP-MCNC: 2.4 G/DL (ref 3.5–5.2)
ANION GAP SERPL CALC-SCNC: 12 MMOL/L (ref 8–16)
BASOPHILS # BLD AUTO: 0.02 K/UL (ref 0–0.2)
BASOPHILS # BLD AUTO: 0.04 K/UL (ref 0–0.2)
BASOPHILS NFR BLD: 0.2 % (ref 0–1.9)
BASOPHILS NFR BLD: 0.3 % (ref 0–1.9)
BLD PROD TYP BPU: NORMAL
BLOOD UNIT EXPIRATION DATE: NORMAL
BLOOD UNIT TYPE CODE: 7300
BLOOD UNIT TYPE: NORMAL
BUN SERPL-MCNC: 64 MG/DL (ref 8–23)
CALCIUM SERPL-MCNC: 8.8 MG/DL (ref 8.7–10.5)
CHLORIDE SERPL-SCNC: 100 MMOL/L (ref 95–110)
CO2 SERPL-SCNC: 28 MMOL/L (ref 23–29)
CODING SYSTEM: NORMAL
CREAT SERPL-MCNC: 6.1 MG/DL (ref 0.5–1.4)
DIFFERENTIAL METHOD: ABNORMAL
DIFFERENTIAL METHOD: ABNORMAL
DISPENSE STATUS: NORMAL
EOSINOPHIL # BLD AUTO: 0 K/UL (ref 0–0.5)
EOSINOPHIL # BLD AUTO: 0.1 K/UL (ref 0–0.5)
EOSINOPHIL NFR BLD: 0.2 % (ref 0–8)
EOSINOPHIL NFR BLD: 0.6 % (ref 0–8)
ERYTHROCYTE [DISTWIDTH] IN BLOOD BY AUTOMATED COUNT: 16.2 % (ref 11.5–14.5)
ERYTHROCYTE [DISTWIDTH] IN BLOOD BY AUTOMATED COUNT: 16.4 % (ref 11.5–14.5)
EST. GFR  (NO RACE VARIABLE): 6.7 ML/MIN/1.73 M^2
GLUCOSE SERPL-MCNC: 111 MG/DL (ref 70–110)
HCT VFR BLD AUTO: 22.2 % (ref 37–48.5)
HCT VFR BLD AUTO: 23 % (ref 37–48.5)
HGB BLD-MCNC: 6.9 G/DL (ref 12–16)
HGB BLD-MCNC: 7.2 G/DL (ref 12–16)
IMM GRANULOCYTES # BLD AUTO: 0.22 K/UL (ref 0–0.04)
IMM GRANULOCYTES # BLD AUTO: 0.47 K/UL (ref 0–0.04)
IMM GRANULOCYTES NFR BLD AUTO: 1.8 % (ref 0–0.5)
IMM GRANULOCYTES NFR BLD AUTO: 3.6 % (ref 0–0.5)
LYMPHOCYTES # BLD AUTO: 3 K/UL (ref 1–4.8)
LYMPHOCYTES # BLD AUTO: 3.5 K/UL (ref 1–4.8)
LYMPHOCYTES NFR BLD: 23.3 % (ref 18–48)
LYMPHOCYTES NFR BLD: 28.5 % (ref 18–48)
MCH RBC QN AUTO: 27 PG (ref 27–31)
MCH RBC QN AUTO: 27.6 PG (ref 27–31)
MCHC RBC AUTO-ENTMCNC: 31.1 G/DL (ref 32–36)
MCHC RBC AUTO-ENTMCNC: 31.3 G/DL (ref 32–36)
MCV RBC AUTO: 87 FL (ref 82–98)
MCV RBC AUTO: 88 FL (ref 82–98)
MONOCYTES # BLD AUTO: 1.6 K/UL (ref 0.3–1)
MONOCYTES # BLD AUTO: 1.6 K/UL (ref 0.3–1)
MONOCYTES NFR BLD: 11.9 % (ref 4–15)
MONOCYTES NFR BLD: 12.9 % (ref 4–15)
NEUTROPHILS # BLD AUTO: 6.9 K/UL (ref 1.8–7.7)
NEUTROPHILS # BLD AUTO: 7.9 K/UL (ref 1.8–7.7)
NEUTROPHILS NFR BLD: 56.4 % (ref 38–73)
NEUTROPHILS NFR BLD: 60.3 % (ref 38–73)
NRBC BLD-RTO: 0 /100 WBC
NRBC BLD-RTO: 0 /100 WBC
NUM UNITS TRANS PACKED RBC: NORMAL
PHOSPHATE SERPL-MCNC: 3.9 MG/DL (ref 2.7–4.5)
PLATELET # BLD AUTO: 474 K/UL (ref 150–450)
PLATELET # BLD AUTO: 510 K/UL (ref 150–450)
PMV BLD AUTO: 9.6 FL (ref 9.2–12.9)
PMV BLD AUTO: 9.8 FL (ref 9.2–12.9)
POCT GLUCOSE: 128 MG/DL (ref 70–110)
POCT GLUCOSE: 78 MG/DL (ref 70–110)
POTASSIUM SERPL-SCNC: 3.5 MMOL/L (ref 3.5–5.1)
RBC # BLD AUTO: 2.56 M/UL (ref 4–5.4)
RBC # BLD AUTO: 2.61 M/UL (ref 4–5.4)
SODIUM SERPL-SCNC: 140 MMOL/L (ref 136–145)
WBC # BLD AUTO: 12.25 K/UL (ref 3.9–12.7)
WBC # BLD AUTO: 13.03 K/UL (ref 3.9–12.7)

## 2023-02-02 PROCEDURE — P9016 RBC LEUKOCYTES REDUCED: HCPCS | Performed by: HOSPITALIST

## 2023-02-02 PROCEDURE — 25000003 PHARM REV CODE 250: Performed by: HOSPITALIST

## 2023-02-02 PROCEDURE — 85025 COMPLETE CBC W/AUTO DIFF WBC: CPT | Performed by: HOSPITALIST

## 2023-02-02 PROCEDURE — 97116 GAIT TRAINING THERAPY: CPT

## 2023-02-02 PROCEDURE — 63600175 PHARM REV CODE 636 W HCPCS: Mod: JG | Performed by: INTERNAL MEDICINE

## 2023-02-02 PROCEDURE — 63600175 PHARM REV CODE 636 W HCPCS: Performed by: HOSPITALIST

## 2023-02-02 PROCEDURE — 94761 N-INVAS EAR/PLS OXIMETRY MLT: CPT

## 2023-02-02 PROCEDURE — 12000002 HC ACUTE/MED SURGE SEMI-PRIVATE ROOM

## 2023-02-02 PROCEDURE — 90935 HEMODIALYSIS ONE EVALUATION: CPT | Mod: ,,, | Performed by: INTERNAL MEDICINE

## 2023-02-02 PROCEDURE — 90935 HEMODIALYSIS ONE EVALUATION: CPT

## 2023-02-02 PROCEDURE — 25000003 PHARM REV CODE 250: Performed by: INTERNAL MEDICINE

## 2023-02-02 PROCEDURE — 90935 PR HEMODIALYSIS, ONE EVALUATION: ICD-10-PCS | Mod: ,,, | Performed by: INTERNAL MEDICINE

## 2023-02-02 PROCEDURE — 99232 SBSQ HOSP IP/OBS MODERATE 35: CPT | Mod: ,,, | Performed by: HOSPITALIST

## 2023-02-02 PROCEDURE — 99232 PR SUBSEQUENT HOSPITAL CARE,LEVL II: ICD-10-PCS | Mod: ,,, | Performed by: HOSPITALIST

## 2023-02-02 PROCEDURE — 80069 RENAL FUNCTION PANEL: CPT | Performed by: HOSPITALIST

## 2023-02-02 RX ADMIN — FLUTICASONE PROPIONATE 50 MCG: 50 SPRAY, METERED NASAL at 12:02

## 2023-02-02 RX ADMIN — HEPARIN SODIUM 1000 UNITS: 5000 INJECTION INTRAVENOUS; SUBCUTANEOUS at 10:02

## 2023-02-02 RX ADMIN — ERYTHROPOIETIN 4000 UNITS: 4000 INJECTION, SOLUTION INTRAVENOUS; SUBCUTANEOUS at 10:02

## 2023-02-02 RX ADMIN — MAGNESIUM OXIDE TAB 400 MG (241.3 MG ELEMENTAL MG) 400 MG: 400 (241.3 MG) TAB at 12:02

## 2023-02-02 RX ADMIN — QUETIAPINE FUMARATE 12.5 MG: 25 TABLET ORAL at 09:02

## 2023-02-02 RX ADMIN — FLUTICASONE PROPIONATE 50 MCG: 50 SPRAY, METERED NASAL at 09:02

## 2023-02-02 RX ADMIN — PANTOPRAZOLE SODIUM 40 MG: 40 TABLET, DELAYED RELEASE ORAL at 04:02

## 2023-02-02 RX ADMIN — LEVOTHYROXINE SODIUM 25 MCG: 25 TABLET ORAL at 06:02

## 2023-02-02 RX ADMIN — FUROSEMIDE 40 MG: 40 TABLET ORAL at 12:02

## 2023-02-02 RX ADMIN — LIDOCAINE 1 PATCH: 50 PATCH CUTANEOUS at 06:02

## 2023-02-02 RX ADMIN — ATOVAQUONE 1500 MG: 750 SUSPENSION ORAL at 12:02

## 2023-02-02 RX ADMIN — APIXABAN 5 MG: 5 TABLET, FILM COATED ORAL at 12:02

## 2023-02-02 RX ADMIN — APIXABAN 5 MG: 5 TABLET, FILM COATED ORAL at 09:02

## 2023-02-02 RX ADMIN — PREDNISONE 5 MG: 5 TABLET ORAL at 12:02

## 2023-02-02 RX ADMIN — CARVEDILOL 12.5 MG: 12.5 TABLET, FILM COATED ORAL at 09:02

## 2023-02-02 RX ADMIN — PANTOPRAZOLE SODIUM 40 MG: 40 TABLET, DELAYED RELEASE ORAL at 06:02

## 2023-02-02 RX ADMIN — Medication 1 TABLET: at 12:02

## 2023-02-02 RX ADMIN — AMLODIPINE BESYLATE 10 MG: 10 TABLET ORAL at 12:02

## 2023-02-02 RX ADMIN — HYDRALAZINE HYDROCHLORIDE 50 MG: 25 TABLET, FILM COATED ORAL at 09:02

## 2023-02-02 RX ADMIN — WHITE PETROLATUM: 1.75 OINTMENT TOPICAL at 12:02

## 2023-02-02 NOTE — PLAN OF CARE
Problem: Physical Therapy  Goal: Physical Therapy Goal  Description: Goals to be met by: 23     Patient will increase functional independence with mobility by performin. Sit to stand transfer with Supervision - Met  1a. Sit to stand transfer with mod I  2. Gait  x 150 feet with Supervision using Rolling Walker.   3. Stand for 8 minutes with Supervision using Rolling Walker    Outcome: Ongoing, Progressing

## 2023-02-02 NOTE — ASSESSMENT & PLAN NOTE
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 Now iHD for which she receives HD on one but usually twice weekly for metabolic clearance via tunneled HD catheter roughly x2 a week with last HD Friday (01/06/2023).    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  2)  Necrotizing cresentic glomerulonephritis/[id number] necrotizing cresecentic glomerulonephritis.  3)  Focal acute pyelonephritis.  4)  Mild-to-moderate arterionephrosclerosis    Plan/Recommendations:  - HD today for volume management and metabolic clearance  - outpatient nephrologist patient closely for signs of renal recovery as she has previously only been dialyzed once to twice weekly and has only received two HD sessions while inpatient this admission in addition to immunosuppressive management    - can continue Lasix 40 mg PO daily for now  - daily RFPs and magnesium  - currently on prednisone 5 mg daily with atovaquone 1.5 grams faily for PJP prophylaxis   - renal diet if not NPO  - strict I/Os and daily weights  - renally dose all medications to eGFR  - avoid nephrotoxic agents when feasible (i.e. intra-arterial contrast, NSAIDs, supra-therapeutic vancomycin troughs, etc.)

## 2023-02-02 NOTE — SUBJECTIVE & OBJECTIVE
Interval History: Patient seen and examined today while undergoing HD for which she is tolerating well without any physical complaints this morning. No acute events overnight. Afebrile overnight with pulse ranging from 70-60s bpm. Systolic blood pressures ranging from 120-110s mmHg. She is saturating +95% on room air with two unmeasured voids in the last 24 hours. Hemoglobin 7.2 this morning for which she is receiving one unit pRBCs with HD.    Review of patient's allergies indicates:   Allergen Reactions    Ampicillin     Peaches [peach (prunus persica)] Other (See Comments)     Pt unable to state type of reaction. Information obtained from daughter who states she was informed of allergy from patient.    Penicillins      Other reaction(s): Hives, anaphylaxis    Sulfa (sulfonamide antibiotics) Rash and Hives     Current Facility-Administered Medications   Medication Frequency    0.9%  NaCl infusion (for blood administration) Q24H PRN    0.9%  NaCl infusion Once    0.9%  NaCl infusion Once    acetaminophen tablet 650 mg Q4H PRN    albuterol-ipratropium 2.5 mg-0.5 mg/3 mL nebulizer solution 3 mL Q4H PRN    aluminum-magnesium hydroxide 200-200 mg/5 mL suspension 30 mL QID PRN    amLODIPine tablet 10 mg Daily    apixaban tablet 5 mg BID    atovaquone 750 mg/5 mL oral liquid 1,500 mg Daily    B-complex with vitamin C tablet 1 tablet Daily    bisacodyL suppository 10 mg Daily PRN    carvediloL tablet 12.5 mg BID    cloNIDine tablet 0.1 mg Q8H PRN    epoetin hira injection 4,000 Units Every Tues, Thurs, Sat    fluticasone propionate 50 mcg/actuation nasal spray 50 mcg BID    furosemide tablet 40 mg Daily    heparin (porcine) injection 1,000 Units PRN    heparin (porcine) injection 1,000 Units PRN    heparin (porcine) injection 1,000 Units PRN    hydrALAZINE tablet 50 mg Q8H    levothyroxine tablet 25 mcg Before breakfast    LIDOcaine 5 % patch 1 patch Q24H    loperamide capsule 2 mg QID PRN    magnesium oxide tablet 400 mg  Daily    meclizine tablet 25 mg TID PRN    melatonin tablet 6 mg Nightly PRN    methocarbamoL tablet 500 mg TID PRN    naloxone 0.4 mg/mL injection 0.02 mg PRN    ondansetron injection 4 mg Q8H PRN    pantoprazole EC tablet 40 mg BID AC    predniSONE tablet 5 mg Daily    prochlorperazine injection Soln 5 mg Q6H PRN    quetiapine split tablet 12.5 mg QHS    senna-docusate 8.6-50 mg per tablet 1 tablet BID PRN    simethicone chewable tablet 80 mg QID PRN    sodium chloride 0.9% bolus 250 mL 250 mL PRN    sodium chloride 0.9% bolus 250 mL 250 mL PRN    sodium chloride 0.9% bolus 250 mL 250 mL PRN    sodium chloride 0.9% flush 10 mL PRN    sodium chloride 0.9% flush 10 mL Q6H PRN    sucralfate tablet 1 g TID PRN    tiZANidine tablet 4 mg BID PRN    traMADoL tablet 50 mg Q8H PRN    white petrolatum 41 % ointment Daily     Facility-Administered Medications Ordered in Other Encounters   Medication Frequency    celecoxib capsule 400 mg Once    fentaNYL 50 mcg/mL injection  mcg PRN    LIDOcaine (PF) 10 mg/ml (1%) injection 10 mg Once PRN    LIDOcaine (PF) 10 mg/ml (1%) injection 10 mg Once    midazolam (VERSED) 1 mg/mL injection 0.5-4 mg PRN    ropivacaine 0.2% [facility] PainPRO Pump infusion 500 ML Continuous       Objective:     Vital Signs (Most Recent):  Temp: 98 °F (36.7 °C) (02/01/23 2300)  Pulse: 73 (02/02/23 0411)  Resp: 18 (02/01/23 2300)  BP: 115/72 (02/02/23 0411)  SpO2: 95 % (02/02/23 0700)   Vital Signs (24h Range):  Temp:  [98 °F (36.7 °C)] 98 °F (36.7 °C)  Pulse:  [64-83] 73  Resp:  [16-18] 18  SpO2:  [93 %-97 %] 95 %  BP: (112-137)/(54-72) 115/72     Weight: 59.6 kg (131 lb 6.3 oz) (01/30/23 2053)  Body mass index is 24.83 kg/m².  Body surface area is 1.6 meters squared.    No intake/output data recorded.    Physical Exam  Vitals and nursing note reviewed.   Constitutional:       General: She is awake. She is not in acute distress.     Appearance: Normal appearance. She is overweight. She is not  ill-appearing or diaphoretic.   HENT:      Head: Normocephalic and atraumatic.      Right Ear: External ear normal.      Left Ear: External ear normal.      Nose: Nose normal.      Mouth/Throat:      Mouth: Mucous membranes are moist.      Pharynx: Oropharynx is clear. No oropharyngeal exudate or posterior oropharyngeal erythema.   Eyes:      General: No scleral icterus.        Right eye: No discharge.         Left eye: No discharge.      Extraocular Movements: Extraocular movements intact.      Conjunctiva/sclera: Conjunctivae normal.   Cardiovascular:      Rate and Rhythm: Normal rate.      Heart sounds: Murmur heard.   Systolic murmur is present with a grade of 1/6.     No friction rub. No gallop.      Comments: Bilateral pitting lower extremity edema which extends proximally.  Pulmonary:      Effort: Pulmonary effort is normal. No respiratory distress.      Breath sounds: No wheezing, rhonchi or rales.   Chest:      Comments: Tunneled HD catheters to right chest wall.   Abdominal:      General: Bowel sounds are normal. There is no distension.      Palpations: Abdomen is soft.      Tenderness: There is no abdominal tenderness.   Musculoskeletal:      Cervical back: Neck supple.      Right lower le+ Pitting Edema present.      Left lower le+ Pitting Edema present.   Skin:     General: Skin is warm and dry.      Capillary Refill: Capillary refill takes less than 2 seconds.      Coloration: Skin is not jaundiced.      Findings: No erythema.   Neurological:      General: No focal deficit present.      Mental Status: She is alert. Mental status is at baseline.      Cranial Nerves: No cranial nerve deficit.   Psychiatric:         Mood and Affect: Mood normal.         Behavior: Behavior normal. Behavior is cooperative.       Significant Labs:  BMP:   Recent Labs   Lab 23  0409 23  1148 23  0527   GLU 86   < > 63*      < > 140   K 3.6   < > 4.4      < > 103   CO2 24   < > 21*   BUN  38*   < > 59*   CREATININE 4.2*   < > 5.8*   CALCIUM 8.7   < > 8.6*   MG 1.9  --   --     < > = values in this interval not displayed.       CBC:   Recent Labs   Lab 02/01/23  2347   WBC 12.25   RBC 2.56*   HGB 6.9*   HCT 22.2*   *   MCV 87   MCH 27.0   MCHC 31.1*       CMP:   Recent Labs   Lab 01/31/23  0911 02/01/23  0527    63*   CALCIUM 9.0 8.6*   ALBUMIN 2.4*  --     140   K 3.9 4.4   CO2 25 21*    103   BUN 58* 59*   CREATININE 5.4* 5.8*       Coagulation:   Recent Labs   Lab 01/29/23  0356   APTT 62.1*       Microbiology Results (last 7 days)       ** No results found for the last 168 hours. **          Specimen (24h ago, onward)      None          Significant Imaging:  I have reviewed all imagining in the last 24 hours.

## 2023-02-02 NOTE — PROGRESS NOTES
[facility] ([place] [address])  Nephrology  Progress Note    Patient Name: Kristin Goodman  MRN: [id number]  Admission Date: 1/9/2023  Hospital Length of Stay: 24 days  Attending Provider: Madie Lancaster MD   Primary Care Physician: Lori Hernandez MD  Principal Problem:Acute renal failure on dialysis    Subjective:     HPI: Ms Goodman is a 77-year-old woman with hypertension, hypothyroidism, HFpEF (most recent TTE with EF 65% with G1DD), pulmonary hypertension, current ESBL E. coli UTI, osteoarthritis, chronic back pain, DONAVAN and microscopic polyangiitis complicated base on biopsy from 10/26 by renal failure, GIB and respiratory failure with history of diffuse alveolar hemorrhage s/p IV Solumedrol, PLEX x5 sessions, Rituximab x4 doses and cyclophosphamide however now  just on steroid taper (cyclophosphamide appears to be hold secondary to anemia) now iHD dependent twice weekly via tunneled HD catheter for metabolic clearance (follows with Dr. Mojica with [facility]) who presented from [facility] for TDC removal in the setting of positive blood cultures growing Enterococcus faecalis and Bacillus species from cultures obtained on 1/5 & 1/7. In addition patient with reported syncopal event and sluggish flows on HD on 1/9 (incomplete session, daughter attributes to iron infusion) with last full session of iHD being on 1/6. She reports still making good urine without any urinary complaints today. Nephrology has been consulted for inpatient HD needs.      Interval History: Patient seen and examined today while undergoing HD for which she is tolerating well without any physical complaints this morning. No acute events overnight. Afebrile overnight with pulse ranging from 70-60s bpm. Systolic blood pressures ranging from 120-110s mmHg. She is saturating +95% on room air with two unmeasured voids in the last 24 hours. Hemoglobin 7.2 this morning for which she is receiving one unit pRBCs with  HD.    Review of patient's allergies indicates:   Allergen Reactions    Ampicillin     Peaches [peach (prunus persica)] Other (See Comments)     Pt unable to state type of reaction. Information obtained from daughter who states she was informed of allergy from patient.    Penicillins      Other reaction(s): Hives, anaphylaxis    Sulfa (sulfonamide antibiotics) Rash and Hives     Current Facility-Administered Medications   Medication Frequency    0.9%  NaCl infusion (for blood administration) Q24H PRN    0.9%  NaCl infusion Once    0.9%  NaCl infusion Once    acetaminophen tablet 650 mg Q4H PRN    albuterol-ipratropium 2.5 mg-0.5 mg/3 mL nebulizer solution 3 mL Q4H PRN    aluminum-magnesium hydroxide 200-200 mg/5 mL suspension 30 mL QID PRN    amLODIPine tablet 10 mg Daily    apixaban tablet 5 mg BID    atovaquone 750 mg/5 mL oral liquid 1,500 mg Daily    B-complex with vitamin C tablet 1 tablet Daily    bisacodyL suppository 10 mg Daily PRN    carvediloL tablet 12.5 mg BID    cloNIDine tablet 0.1 mg Q8H PRN    epoetin hira injection 4,000 Units Every Tues, Thurs, Sat    fluticasone propionate 50 mcg/actuation nasal spray 50 mcg BID    furosemide tablet 40 mg Daily    heparin (porcine) injection 1,000 Units PRN    heparin (porcine) injection 1,000 Units PRN    heparin (porcine) injection 1,000 Units PRN    hydrALAZINE tablet 50 mg Q8H    levothyroxine tablet 25 mcg Before breakfast    LIDOcaine 5 % patch 1 patch Q24H    loperamide capsule 2 mg QID PRN    magnesium oxide tablet 400 mg Daily    meclizine tablet 25 mg TID PRN    melatonin tablet 6 mg Nightly PRN    methocarbamoL tablet 500 mg TID PRN    naloxone 0.4 mg/mL injection 0.02 mg PRN    ondansetron injection 4 mg Q8H PRN    pantoprazole EC tablet 40 mg BID AC    predniSONE tablet 5 mg Daily    prochlorperazine injection Soln 5 mg Q6H PRN    quetiapine split tablet 12.5 mg QHS    senna-docusate 8.6-50 mg per tablet 1 tablet  BID PRN    simethicone chewable tablet 80 mg QID PRN    sodium chloride 0.9% bolus 250 mL 250 mL PRN    sodium chloride 0.9% bolus 250 mL 250 mL PRN    sodium chloride 0.9% bolus 250 mL 250 mL PRN    sodium chloride 0.9% flush 10 mL PRN    sodium chloride 0.9% flush 10 mL Q6H PRN    sucralfate tablet 1 g TID PRN    tiZANidine tablet 4 mg BID PRN    traMADoL tablet 50 mg Q8H PRN    white petrolatum 41 % ointment Daily     Facility-Administered Medications Ordered in Other Encounters   Medication Frequency    celecoxib capsule 400 mg Once    fentaNYL 50 mcg/mL injection  mcg PRN    LIDOcaine (PF) 10 mg/ml (1%) injection 10 mg Once PRN    LIDOcaine (PF) 10 mg/ml (1%) injection 10 mg Once    midazolam (VERSED) 1 mg/mL injection 0.5-4 mg PRN    ropivacaine 0.2% [facility] PainPRO Pump infusion 500 ML Continuous       Objective:     Vital Signs (Most Recent):  Temp: 98 °F (36.7 °C) (02/01/23 2300)  Pulse: 73 (02/02/23 0411)  Resp: 18 (02/01/23 2300)  BP: 115/72 (02/02/23 0411)  SpO2: 95 % (02/02/23 0700)   Vital Signs (24h Range):  Temp:  [98 °F (36.7 °C)] 98 °F (36.7 °C)  Pulse:  [64-83] 73  Resp:  [16-18] 18  SpO2:  [93 %-97 %] 95 %  BP: (112-137)/(54-72) 115/72     Weight: 59.6 kg (131 lb 6.3 oz) (01/30/23 2053)  Body mass index is 24.83 kg/m².  Body surface area is 1.6 meters squared.    No intake/output data recorded.    Physical Exam  Vitals and nursing note reviewed.   Constitutional:       General: She is awake. She is not in acute distress.     Appearance: Normal appearance. She is overweight. She is not ill-appearing or diaphoretic.   HENT:      Head: Normocephalic and atraumatic.      Right Ear: External ear normal.      Left Ear: External ear normal.      Nose: Nose normal.      Mouth/Throat:      Mouth: Mucous membranes are moist.      Pharynx: Oropharynx is clear. No oropharyngeal exudate or posterior oropharyngeal erythema.   Eyes:      General: No scleral icterus.        Right eye: No  discharge.         Left eye: No discharge.      Extraocular Movements: Extraocular movements intact.      Conjunctiva/sclera: Conjunctivae normal.   Cardiovascular:      Rate and Rhythm: Normal rate.      Heart sounds: Murmur heard.   Systolic murmur is present with a grade of 1/6.     No friction rub. No gallop.      Comments: Bilateral pitting lower extremity edema which extends proximally.  Pulmonary:      Effort: Pulmonary effort is normal. No respiratory distress.      Breath sounds: No wheezing, rhonchi or rales.   Chest:      Comments: Tunneled HD catheters to right chest wall.   Abdominal:      General: Bowel sounds are normal. There is no distension.      Palpations: Abdomen is soft.      Tenderness: There is no abdominal tenderness.   Musculoskeletal:      Cervical back: Neck supple.      Right lower le+ Pitting Edema present.      Left lower le+ Pitting Edema present.   Skin:     General: Skin is warm and dry.      Capillary Refill: Capillary refill takes less than 2 seconds.      Coloration: Skin is not jaundiced.      Findings: No erythema.   Neurological:      General: No focal deficit present.      Mental Status: She is alert. Mental status is at baseline.      Cranial Nerves: No cranial nerve deficit.   Psychiatric:         Mood and Affect: Mood normal.         Behavior: Behavior normal. Behavior is cooperative.       Significant Labs:  BMP:   Recent Labs   Lab 23  0409 23  1148 23  0527   GLU 86   < > 63*      < > 140   K 3.6   < > 4.4      < > 103   CO2 24   < > 21*   BUN 38*   < > 59*   CREATININE 4.2*   < > 5.8*   CALCIUM 8.7   < > 8.6*   MG 1.9  --   --     < > = values in this interval not displayed.       CBC:   Recent Labs   Lab 23  2347   WBC 12.25   RBC 2.56*   HGB 6.9*   HCT 22.2*   *   MCV 87   MCH 27.0   MCHC 31.1*       CMP:   Recent Labs   Lab 23  0911 23  0527    63*   CALCIUM 9.0 8.6*   ALBUMIN 2.4*  --      140   K 3.9 4.4   CO2 25 21*    103   BUN 58* 59*   CREATININE 5.4* 5.8*       Coagulation:   Recent Labs   Lab 01/29/23  0356   APTT 62.1*       Microbiology Results (last 7 days)       ** No results found for the last 168 hours. **          Specimen (24h ago, onward)      None          Significant Imaging:  I have reviewed all imagining in the last 24 hours.    Assessment/Plan:     * Acute renal failure on dialysis  Primary pauci-immune necrotizing and crescentic glomerulonephritis  Microscopic polyangiitis  WILFREDO (acute kidney injury)  Miscroscopic polyangiitis with renal (biopsy proven pauci immune necrotizing & crescentic glomerulonephritis) and pulmonary involvement s/p Solumedrol 1,000 mg IV x3 doses, PLEX x5 sessions (10/26/2022, 10/27/2022, 10/29/2022, 10/30/2022, 11/01/2022, 11/02/2022, 11/03/2022), Rituximab 375 mg/m^2 x4 (600 mg) on 10/27/2022, 11/03/2922,11/10/2022,11/172022) with cyclophosphamide 7.5 mg/kg on 10/27/2022 and 11/10/2022 (every 14 days). Now iHD for which she receives HD on one but usually twice weekly for metabolic clearance via tunneled HD catheter roughly x2 a week with last HD Friday (01/06/2023).    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  2)  Necrotizing cresentic glomerulonephritis/[id number] necrotizing cresecentic glomerulonephritis.  3)  Focal acute pyelonephritis.  4)  Mild-to-moderate arterionephrosclerosis    Plan/Recommendations:  - HD today for volume management and metabolic clearance  - outpatient nephrologist patient closely for signs of renal recovery as she has previously only been dialyzed once to twice weekly and has only received two HD sessions while inpatient this admission in addition to immunosuppressive  management    - can continue Lasix 40 mg PO daily for now  - daily RFPs and magnesium  - currently on prednisone 5 mg daily with atovaquone 1.5 grams faily for PJP prophylaxis   - renal diet if not NPO  - strict I/Os and daily weights  - renally dose all medications to eGFR  - avoid nephrotoxic agents when feasible (i.e. intra-arterial contrast, NSAIDs, supra-therapeutic vancomycin troughs, etc.)    Acute deep vein thrombosis (DVT) of non-extremity vein - subclavian  - management per primary team  - transitioned to Eliquis on 1/26    Bacteremia due to Enterococcus  - management per ID and primary  - repeat blood cultures NGTD    Urinary tract infection due to extended-spectrum beta lactamase (ESBL) producing Escherichia coli  - Infectious Disease consulted and following  - management per primary team    Anemia due to chronic kidney disease  - goal hemoglobin 10-12,   - most recent iron panel with iron 15, transferrin 138, TIBC 204, 7% saturation and ferritin 378  - transfuse for hemoglobin < 7.0  - defer IV iron in light of recent bacteremia, currently receiving EPO 4,000 units SQ with HD    Primary hypertension  - management per primary team    Hypothyroid  - management per primary team    Thank you for your consult. I will follow-up with patient. Please contact us if you have any additional questions.    Pj Paz MD  Nephrology  [facility] ([facility]

## 2023-02-02 NOTE — SUBJECTIVE & OBJECTIVE
Interval History: No acute events. Pt resting comfortably in bed, no concerns. Awaiting HD chair.     Review of Systems   Constitutional:  Positive for activity change. Negative for fatigue and fever.   HENT:  Negative for sore throat and trouble swallowing.    Eyes:  Negative for visual disturbance.   Respiratory:  Negative for cough and shortness of breath.    Cardiovascular:  Negative for chest pain and leg swelling.   Gastrointestinal:  Negative for abdominal distention and abdominal pain.   Genitourinary:  Negative for difficulty urinating.   Musculoskeletal:  Negative for back pain and gait problem.   Skin:  Negative for color change.   Neurological:  Positive for weakness. Negative for dizziness, light-headedness and headaches.   Psychiatric/Behavioral:  Negative for agitation and confusion.    Objective:     Vital Signs (Most Recent):  Temp: 97.6 °F (36.4 °C) (02/02/23 1130)  Pulse: 62 (02/02/23 1130)  Resp: 18 (02/02/23 1130)  BP: (!) 160/77 (02/02/23 1130)  SpO2: 96 % (02/02/23 0919)   Vital Signs (24h Range):  Temp:  [97.6 °F (36.4 °C)-98 °F (36.7 °C)] 97.6 °F (36.4 °C)  Pulse:  [59-73] 62  Resp:  [16-18] 18  SpO2:  [95 %-97 %] 96 %  BP: (112-166)/(54-77) 160/77     Weight: 59.6 kg (131 lb 6.3 oz)  Body mass index is 24.83 kg/m².    Intake/Output Summary (Last 24 hours) at 2/2/2023 1232  Last data filed at 2/2/2023 1130  Gross per 24 hour   Intake 969 ml   Output 950 ml   Net 19 ml        Physical Exam  Vitals reviewed.   Constitutional:       General: She is not in acute distress.     Appearance: Normal appearance. She is not toxic-appearing.   HENT:      Head: Normocephalic and atraumatic.   Eyes:      General: No scleral icterus.  Cardiovascular:      Rate and Rhythm: Normal rate and regular rhythm.      Pulses: Normal pulses.      Heart sounds: No murmur heard.  Pulmonary:      Effort: Pulmonary effort is normal. No respiratory distress.      Breath sounds: No wheezing, rhonchi or rales.   Chest:       Comments: L TDC  Abdominal:      General: Bowel sounds are normal. There is no distension.      Tenderness: There is no abdominal tenderness. There is no guarding.   Musculoskeletal:      Right lower leg: No edema.      Left lower leg: No edema.   Skin:     General: Skin is warm.   Neurological:      General: No focal deficit present.      Mental Status: She is alert and oriented to person, place, and time. Mental status is at baseline.   Psychiatric:         Behavior: Behavior normal.         Thought Content: Thought content normal.

## 2023-02-02 NOTE — PT/OT/SLP PROGRESS
"Physical Therapy Treatment    Patient Name:  Kristin Goodman   MRN:  [id number]  Admitting Diagnosis:  Acute renal failure on dialysis   Recent Surgery: * No surgery found *    Admit Date: 1/9/2023  Length of Stay: 24 days    Recommendations:     Discharge Recommendations:  home health PT   Discharge Equipment Recommendations: bedside commode   Barriers to discharge: Inaccessible home environment and Evolving Clinical Presentation    Assessment:     Kristin Goodman is a 77 y.o. female admitted with a medical diagnosis of Acute renal failure on dialysis.  She presents with the following impairments/functional limitations:  weakness, impaired endurance, impaired self care skills, impaired functional mobility, gait instability, impaired balance, decreased safety awareness, decreased lower extremity function.     Pt agreeable to therapy, reports post-dialysis fatigue. Pt SUP for standing from bedside chair, SBA for ambulation with RW. Much improved endurance today with ambulation. Continue to progress gait and incorporate stairs as able.    Rehab Prognosis: Good; patient would benefit from acute skilled PT services to address these deficits and reach maximum level of function.    Recent Surgery: * No surgery found *      Treatment Tolerated: Excellent    Highest level of mobility achieved this visit: ambulates 350ft w/ RW and SBA    Activity with RN/PCT: ambulate with 1 person assist    Plan:     During this hospitalization, patient to be seen 3 x/week to address the identified rehab impairments via gait training, therapeutic activities, therapeutic exercises, neuromuscular re-education and progress toward the following goals:    Plan of Care Expires:  02/09/23    Subjective     CRISTINE Blair notified prior to session. No family present upon PT entrance into room.    Chief Complaint: fatigue  Patient/Family Comments/goals: "I'll be going home with my sister after this  Pain/Comfort:  Pain Rating 1: 0/10      Objective: " "    Additional staff present: none    Patient found up in chair with: telemetry   Cognition:   Alert and Cooperative  Command following: Follows two-step verbal commands  Fluency: clear/fluent  General Precautions: Standard, Cardiac aspiration, fall   Orthopedic Precautions:N/A   Braces: N/A   Body mass index is 24.83 kg/m².  Oxygen Device: Room Air      Vitals: /65 (BP Location: Right arm, Patient Position: Sitting)   Pulse 73   Temp 97 °F (36.1 °C) (Oral)   Resp 14   Ht 5' 1" (1.549 m)   Wt 59.6 kg (131 lb 6.3 oz)   SpO2 96%   Breastfeeding No   BMI 24.83 kg/m²     Outcome Measures:  AM-PAC 6 CLICK MOBILITY  Turning over in bed (including adjusting bedclothes, sheets and blankets)?: 4  Sitting down on and standing up from a chair with arms (e.g., wheelchair, bedside commode, etc.): 4  Moving from lying on back to sitting on the side of the bed?: 4  Moving to and from a bed to a chair (including a wheelchair)?: 3  Need to walk in hospital room?: 3  Climbing 3-5 steps with a railing?: 2  Basic Mobility Total Score: 20     Functional Mobility:    Bed Mobility:   Pt found/returned to bedside chair    Transfers:   Sit <> Stand Transfer: supervision with rolling walker   Stand <> Sit Transfer: supervision with rolling walker   [id number] from bedside chair    Standing Balance:  Assistance Level Required: Stand-by Assistance  Patient used: rolling walker   Time: 15 min  Postural deviations noted: no deviations noted      Gait:  Patient ambulated: 350ft   Patient required: standy by assistance  Patient used:  rolling walker   Gait Pattern observed: swing-through gait  Gait Deviation(s): steady gait  Impairments due to: decreased endurance  Gait belt utilized    Education:  Time provided for education, counseling and discussion of health disposition in regards to patient's current status  All questions answered within PT scope of practice and to patient's satisfaction  PT role in POC to address current " functional deficits    Patient left up in chair with call button in reach and nurse Gee notified.    GOALS:   Multidisciplinary Problems       Physical Therapy Goals          Problem: Physical Therapy    Goal Priority Disciplines Outcome Goal Variances Interventions   Physical Therapy Goal     PT, PT/OT Ongoing, Progressing     Description: Goals to be met by: 23     Patient will increase functional independence with mobility by performin. Sit to stand transfer with Supervision - Met  1a. Sit to stand transfer with mod I  2. Gait  x 150 feet with Supervision using Rolling Walker.   3. Stand for 8 minutes with Supervision using Rolling Walker                       Time Tracking:     PT Received On: 23  PT Start Time: 1410     PT Stop Time: 1428  PT Total Time (min): 18 min       Billable Minutes:   Gait Training 15 min    Treatment Type: Treatment  PT/PTA: PT       Khai Velasco, PT, DPT  2023

## 2023-02-02 NOTE — PROGRESS NOTES
HD TX completed.  No net fluid removed.  VSS.   Tolerated dialysis and blood transfusion.  Report given to primary nurse.   Returning to floor via stretcher.

## 2023-02-02 NOTE — PROGRESS NOTES
[facility] ([address] Medicine  Progress Note    Patient Name: Kristin Goodman  MRN: [id number]  Patient Class: IP- Inpatient   Admission Date: 1/9/2023  Length of Stay: 24 days  Attending Physician: Madie Lancaster MD  Primary Care Provider: Lori Hernandez MD        Subjective:     Principal Problem:Acute renal failure on dialysis        HPI:  77-year-old  woman with HTN, hypothyroidism, HFpEF, and osteoarthritis and new acute renal failure due to new diagnosis of Microscopic Polyangiitis s/p renal biopsy and requiring hemodialysis is transferred from [facility] for concerns of new bacteremia.     She was hospitalized from 10/17/22-12/13/22 then transferred to [facility].  Microscopic polyangiitis with pulmonary and renal involvement had suffered respiratory failure with diffuse alveolar hemorrhage, gi bleeding, and renal replacement therapy. S/p Rheumatology consultation and pulse IV steroids + plasmapheresis with Transfusion medicine started on Rituximab and Cyclophosphamide.  Continues on Steroid taper for immunosuppression.     More recently earlier this week noted to have a urine culture grow ESBL E.coli Cystitis, started on meropenem, then she had blood cultures from 1/5 and 1/7 that have grown Enterococcus (amp sensitive) vancomycin started today, patient had sluggish HD catheter with dialysis.   Also ED report that patient may have had syncope during HD today.     She is transferred for continued infectious workup and management as well as removal of tunneled HD line for line holiday.       Overview/Hospital Course:  Patient finished the course of IV Abx,.Seen by ID and nephrology. Plan for line holiday and IR consulted.has been started on HD,pending out patient HD arrangement.  HH is lower,HH was low,improved with pack of RBC,  DC with HH after out patient HD is arranged.      Interval History: No acute events. Pt resting comfortably in bed, no  concerns. Awaiting HD chair.     Review of Systems   Constitutional:  Positive for activity change. Negative for fatigue and fever.   HENT:  Negative for sore throat and trouble swallowing.    Eyes:  Negative for visual disturbance.   Respiratory:  Negative for cough and shortness of breath.    Cardiovascular:  Negative for chest pain and leg swelling.   Gastrointestinal:  Negative for abdominal distention and abdominal pain.   Genitourinary:  Negative for difficulty urinating.   Musculoskeletal:  Negative for back pain and gait problem.   Skin:  Negative for color change.   Neurological:  Positive for weakness. Negative for dizziness, light-headedness and headaches.   Psychiatric/Behavioral:  Negative for agitation and confusion.    Objective:     Vital Signs (Most Recent):  Temp: 97.6 °F (36.4 °C) (02/02/23 1130)  Pulse: 62 (02/02/23 1130)  Resp: 18 (02/02/23 1130)  BP: (!) 160/77 (02/02/23 1130)  SpO2: 96 % (02/02/23 0919)   Vital Signs (24h Range):  Temp:  [97.6 °F (36.4 °C)-98 °F (36.7 °C)] 97.6 °F (36.4 °C)  Pulse:  [59-73] 62  Resp:  [16-18] 18  SpO2:  [95 %-97 %] 96 %  BP: (112-166)/(54-77) 160/77     Weight: 59.6 kg (131 lb 6.3 oz)  Body mass index is 24.83 kg/m².    Intake/Output Summary (Last 24 hours) at 2/2/2023 1232  Last data filed at 2/2/2023 1130  Gross per 24 hour   Intake 969 ml   Output 950 ml   Net 19 ml        Physical Exam  Vitals reviewed.   Constitutional:       General: She is not in acute distress.     Appearance: Normal appearance. She is not toxic-appearing.   HENT:      Head: Normocephalic and atraumatic.   Eyes:      General: No scleral icterus.  Cardiovascular:      Rate and Rhythm: Normal rate and regular rhythm.      Pulses: Normal pulses.      Heart sounds: No murmur heard.  Pulmonary:      Effort: Pulmonary effort is normal. No respiratory distress.      Breath sounds: No wheezing, rhonchi or rales.   Chest:      Comments: L TDC  Abdominal:      General: Bowel sounds are normal.  There is no distension.      Tenderness: There is no abdominal tenderness. There is no guarding.   Musculoskeletal:      Right lower leg: No edema.      Left lower leg: No edema.   Skin:     General: Skin is warm.   Neurological:      General: No focal deficit present.      Mental Status: She is alert and oriented to person, place, and time. Mental status is at baseline.   Psychiatric:         Behavior: Behavior normal.         Thought Content: Thought content normal.             Assessment/Plan:      * Acute renal failure on dialysis  -due to Microscopic Polyangiitis  - IR consulted. S/p permcath placement  on 1/17/23   - US of UE showed DVTs in R and L UE.   - CXR:The dialysis catheter remains in position  - S/p R TDC replacement on 01/23 by IR.  On 01/24, TDC was not working again for hemodialysis.  Consulted IR again.  S/p Left TDC placement on 1/25.   - Nephrology following. Patient will need HD chair set up prior to d/c.  -s/p Removal of R TDC  -Discharge planning. Awaiting HD chair    WILFREDO (acute kidney injury)  On HD.      Acute deep vein thrombosis (DVT) of non-extremity vein - subclavian  Heparin drip started 1/19 pending procedures to re-establish HD access.  Switched to DOAC    Pauci-immune vasculitis  Continue steroid taper    Bacteremia due to Enterococcus  Positive blood cxs 1/5, 1/7, 1/9. Received 1 gm IV vancomycin at LTAC on 1/10.   HD tunelled cath removed 1/10.  Also has midline from LTAC - removed. Surface echo - no vegetation. ID consulted. Last surveillance bcxs 1/11/23 NGTD  Per ID - 2-weeks vanc from negative bcxs/line removal - end date 1/24. Completed.    Urinary tract infection due to extended-spectrum beta lactamase (ESBL) producing Escherichia coli  Urine culture from 1/05 with ESBL E.coli   -Ertapenem renal dosing 500mg IV q24 ordered, completed 5 days (1/5-1/10). Missed dose on 1/9 due to transfer to hospital    Anemia due to chronic kidney disease  Has required transfusions during  recent inpatient/LTAC stays   -was receiving EPO infusion at nephrology direction.   Hb 6.7 on 01/16.  Ordered a unit of blood.  Consent obtained.  Continue to monitor CBC.  Goal for transfusion hemoglobin less than 7.  EPO during HD  HH was low,improved with pack of RBC,      Primary pauci-immune necrotizing and crescentic glomerulonephritis  Patient with acute kidney injury likely due to microscopic polyangiits with granulomatosis (MPA) WILFREDO is currently stable. Labs reviewed- Renal function/electrolytes with Estimated Creatinine Clearance: 7.5 mL/min (A) (based on SCr of 5.4 mg/dL (H)). according to latest data. Monitor urine output and serial BMP and adjust therapy as needed. Avoid nephrotoxins and renally dose meds for GFR listed above.  Had complex course with DAH, requiring pulse dose steroids, plasmapheresis and then rituximab and cyclophosphamide  -continue prednisone taper  -continue atovaquone for PJP ppx  -will need outpatient Rheum follow up    Gastric reflux  Continue BID PPI    Dysphagia  Continue renal diet   SLP following     Microscopic polyangiitis  -continue steroid taper   -patient is on OI prophylaxis with atovaquone 1500mg po daily  -continued on Protonix 40mg po bid while on glucocorticoids.     Impaired mobility  OT/PT     Chronic diastolic heart failure  Has preserved EF   -HD was primary volume maintenance   -continue Lasix 40mg po daily - consider higher or more frequent doses are required during her LIne holiday     Syncope  Report of possible syncope with HD,  Dizzy spells noted per LTAC notes - pt s/p MRI brain on 12/8 w/o acute findings   -neurology prior eval has recommended PT/OT for vestibular therapy  -Future outpatient cardiology visit for possible tilt-table eval.   -refer to neurology at discharge for BPPV follow up    Primary hypertension  Continue carvedilol  Add amlodipine  -home lisinopril is on hold due to her WILFREDO    Hypothyroid  Continue levothyroxine 25mcg       VTE Risk  Mitigation (From admission, onward)         Ordered     heparin (porcine) injection 1,000 Units  As needed (PRN)         02/01/23 1124     heparin (porcine) injection 1,000 Units  As needed (PRN)         02/01/23 1518     apixaban tablet 5 mg  2 times daily         01/30/23 0919     heparin (porcine) injection 1,000 Units  As needed (PRN)         01/25/23 1128     heparin (porcine) injection 1,000 Units  As needed (PRN)         01/23/23 1321     heparin (porcine) injection 1,000 Units  As needed (PRN)         01/17/23 1421     heparin (porcine) injection 1,000 Units  As needed (PRN)         01/09/23 2330     Place sequential compression device  Until discontinued         01/09/23 2330                Discharge Planning   ANTHONY: 2/2/2023     Code Status: Full Code   Is the patient medically ready for discharge?: Yes    Reason for patient still in hospital (select all that apply): Patient trending condition  Discharge Plan A: Home, Home with family, Home Health   Discharge Delays: (!) Dialysis Set-up              Madie Lancaster MD  Department of Hospital Medicine   [facility] - Intensive Care ([place])

## 2023-02-02 NOTE — ASSESSMENT & PLAN NOTE
Has required transfusions during recent inpatient/LTAC stays   -was receiving EPO infusion at nephrology direction.   Hb 6.7 on 01/16.  Ordered a unit of blood.  Consent obtained.  Continue to monitor CBC.  Goal for transfusion hemoglobin less than 7.  EPO during HD  HH was low,improved with pack of RBC,

## 2023-02-02 NOTE — PLAN OF CARE
WYATT spoke to Gibson manzano/ [facility] [phone] ext. [phone] and was advised pt accepted by [facility].  WYATT sent referral to [facility].      WYATT spoke to Blanca-Coordinator w/ [facility] admission [phone] Ext. [phone] in regards to new HD chair with [facility] [phone].  Blanca advised SW clinicals was sent to [facility] Medical Team for review and still waiting for a response on if they are able to accept pt.  Blanca will send tentative schedule to WYATT via fax in the a.m.       02/02/23 2067   Post-Acute Status   Post-Acute Authorization Home Health;Dialysis   Home Health Status Pending post-acute provider review/more information requested  (Per Gibson manzano/ [facility], pt accepted by [facility])   Diaylsis Status Referrals Sent   Coverage [employer]   Discharge Delays None known at this time   Discharge Plan   Discharge Plan A Home;Home with family;Home Health;Other  ([facility]/  [facility])   Discharge Plan B Home;Home with family       Indigo Brown LMSW  PRN-  [facility]  Ext. [phone]

## 2023-02-03 VITALS
TEMPERATURE: 99 F | HEART RATE: 82 BPM | BODY MASS INDEX: 22.73 KG/M2 | OXYGEN SATURATION: 98 % | HEIGHT: 61 IN | RESPIRATION RATE: 15 BRPM | DIASTOLIC BLOOD PRESSURE: 56 MMHG | SYSTOLIC BLOOD PRESSURE: 114 MMHG | WEIGHT: 120.38 LBS

## 2023-02-03 LAB — POCT GLUCOSE: 123 MG/DL (ref 70–110)

## 2023-02-03 PROCEDURE — 25000003 PHARM REV CODE 250: Performed by: HOSPITALIST

## 2023-02-03 PROCEDURE — 25000003 PHARM REV CODE 250: Performed by: INTERNAL MEDICINE

## 2023-02-03 PROCEDURE — 99233 SBSQ HOSP IP/OBS HIGH 50: CPT | Mod: ,,, | Performed by: INTERNAL MEDICINE

## 2023-02-03 PROCEDURE — 97535 SELF CARE MNGMENT TRAINING: CPT

## 2023-02-03 PROCEDURE — 99239 PR HOSPITAL DISCHARGE DAY,>30 MIN: ICD-10-PCS | Mod: ,,, | Performed by: HOSPITALIST

## 2023-02-03 PROCEDURE — 99239 HOSP IP/OBS DSCHRG MGMT >30: CPT | Mod: ,,, | Performed by: HOSPITALIST

## 2023-02-03 PROCEDURE — 99233 PR SUBSEQUENT HOSPITAL CARE,LEVL III: ICD-10-PCS | Mod: ,,, | Performed by: INTERNAL MEDICINE

## 2023-02-03 PROCEDURE — 94761 N-INVAS EAR/PLS OXIMETRY MLT: CPT

## 2023-02-03 PROCEDURE — 97168 OT RE-EVAL EST PLAN CARE: CPT

## 2023-02-03 PROCEDURE — 63600175 PHARM REV CODE 636 W HCPCS: Performed by: HOSPITALIST

## 2023-02-03 RX ADMIN — FLUTICASONE PROPIONATE 50 MCG: 50 SPRAY, METERED NASAL at 09:02

## 2023-02-03 RX ADMIN — HYDRALAZINE HYDROCHLORIDE 50 MG: 25 TABLET, FILM COATED ORAL at 05:02

## 2023-02-03 RX ADMIN — LEVOTHYROXINE SODIUM 25 MCG: 25 TABLET ORAL at 05:02

## 2023-02-03 RX ADMIN — ATOVAQUONE 1500 MG: 750 SUSPENSION ORAL at 09:02

## 2023-02-03 RX ADMIN — Medication 1 TABLET: at 09:02

## 2023-02-03 RX ADMIN — AMLODIPINE BESYLATE 10 MG: 10 TABLET ORAL at 09:02

## 2023-02-03 RX ADMIN — CARVEDILOL 12.5 MG: 12.5 TABLET, FILM COATED ORAL at 09:02

## 2023-02-03 RX ADMIN — PREDNISONE 5 MG: 5 TABLET ORAL at 09:02

## 2023-02-03 RX ADMIN — LIDOCAINE 1 PATCH: 50 PATCH CUTANEOUS at 05:02

## 2023-02-03 RX ADMIN — WHITE PETROLATUM: 1.75 OINTMENT TOPICAL at 09:02

## 2023-02-03 RX ADMIN — APIXABAN 5 MG: 5 TABLET, FILM COATED ORAL at 09:02

## 2023-02-03 RX ADMIN — FUROSEMIDE 40 MG: 40 TABLET ORAL at 09:02

## 2023-02-03 RX ADMIN — MAGNESIUM OXIDE TAB 400 MG (241.3 MG ELEMENTAL MG) 400 MG: 400 (241.3 MG) TAB at 09:02

## 2023-02-03 RX ADMIN — PANTOPRAZOLE SODIUM 40 MG: 40 TABLET, DELAYED RELEASE ORAL at 06:02

## 2023-02-03 NOTE — ASSESSMENT & PLAN NOTE
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 Now iHD for which she receives HD on one but usually twice weekly for metabolic clearance via tunneled HD catheter roughly x2 a week with last HD Friday (01/06/2023).    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  2)  Necrotizing cresentic glomerulonephritis/[id number] necrotizing cresecentic glomerulonephritis.  3)  Focal acute pyelonephritis.  4)  Mild-to-moderate arterionephrosclerosis    Plan/Recommendations:  - patient being discharged today , HD tomorrow as outpatient at [facility]  - outpatient nephrologist patient closely for signs of renal recovery as she has previously only been dialyzed once to twice weekly and has only received two HD sessions while inpatient this admission in addition to immunosuppressive management    - can continue Lasix 40 mg PO daily  - currently on prednisone 5 mg daily with atovaquone 1.5 grams faily for PJP prophylaxis

## 2023-02-03 NOTE — PLAN OF CARE
"  Problem: Adult Inpatient Plan of Care  Goal: Plan of Care Review  Outcome: Ongoing, Not Progressing  Goal: Patient-Specific Goal (Individualized)  Outcome: Ongoing, Not Progressing  Pt alert and able to express needs.  No c/o pain.   Patient fell on her butt in bathroom this am(when she attempted to stand she leaned left and the bedside commode that was placed over the toilet flipped to the side causing her to land on the floor.  Patient stated, "I did not hit my head, I only fell on my bottom."   Bed in low position,  call  light in reach.  Family , MD and Charge nurse aware.      "

## 2023-02-03 NOTE — PROGRESS NOTES
[facility] ([place] [address])  Nephrology  Progress Note    Patient Name: Kristin Goodman  MRN: [id number]  Admission Date: 1/9/2023  Hospital Length of Stay: 25 days  Attending Provider: Madie Lancaster MD   Primary Care Physician: Lori Hernandez MD  Principal Problem:Acute renal failure on dialysis    Subjective:     HPI: Ms Goodman is a 77-year-old woman with hypertension, hypothyroidism, HFpEF (most recent TTE with EF 65% with G1DD), pulmonary hypertension, current ESBL E. coli UTI, osteoarthritis, chronic back pain, DONAVAN and microscopic polyangiitis complicated base on biopsy from 10/26 by renal failure, GIB and respiratory failure with history of diffuse alveolar hemorrhage s/p IV Solumedrol, PLEX x5 sessions, Rituximab x4 doses and cyclophosphamide however now  just on steroid taper (cyclophosphamide appears to be hold secondary to anemia) now iHD dependent twice weekly via tunneled HD catheter for metabolic clearance (follows with Dr. Mojica with [facility]) who presented from [facility] for TDC removal in the setting of positive blood cultures growing Enterococcus faecalis and Bacillus species from cultures obtained on 1/5 & 1/7. In addition patient with reported syncopal event and sluggish flows on HD on 1/9 (incomplete session, daughter attributes to iron infusion) with last full session of iHD being on 1/6. She reports still making good urine without any urinary complaints today. Nephrology has been consulted for inpatient HD needs.      Interval History: Patient seen and examined this AM. Daughter at bedside. Overnight feel from bedside commode. Denies any physical complaints this morning apart from desire to discharge and tolerated HD yesterday without issue. Afebrile overnight with pulse ranging from 90-70s bpm. Systolic blood pressures ranging from 140-130s mmHg. She is saturating +95% on room air with 1.1 liters in the past 24 hours. Tentative plans to be discharged  today per primary team.    Review of patient's allergies indicates:   Allergen Reactions    Ampicillin     Peaches [peach (prunus persica)] Other (See Comments)     Pt unable to state type of reaction. Information obtained from daughter who states she was informed of allergy from patient.    Penicillins      Other reaction(s): Hives, anaphylaxis    Sulfa (sulfonamide antibiotics) Rash and Hives     Current Facility-Administered Medications   Medication Frequency    0.9%  NaCl infusion (for blood administration) Q24H PRN    0.9%  NaCl infusion Once    0.9%  NaCl infusion Once    acetaminophen tablet 650 mg Q4H PRN    albuterol-ipratropium 2.5 mg-0.5 mg/3 mL nebulizer solution 3 mL Q4H PRN    aluminum-magnesium hydroxide 200-200 mg/5 mL suspension 30 mL QID PRN    amLODIPine tablet 10 mg Daily    apixaban tablet 5 mg BID    atovaquone 750 mg/5 mL oral liquid 1,500 mg Daily    B-complex with vitamin C tablet 1 tablet Daily    bisacodyL suppository 10 mg Daily PRN    carvediloL tablet 12.5 mg BID    cloNIDine tablet 0.1 mg Q8H PRN    epoetin hira injection 4,000 Units Every Tues, Thurs, Sat    fluticasone propionate 50 mcg/actuation nasal spray 50 mcg BID    furosemide tablet 40 mg Daily    heparin (porcine) injection 1,000 Units PRN    hydrALAZINE tablet 50 mg Q8H    levothyroxine tablet 25 mcg Before breakfast    LIDOcaine 5 % patch 1 patch Q24H    loperamide capsule 2 mg QID PRN    magnesium oxide tablet 400 mg Daily    meclizine tablet 25 mg TID PRN    melatonin tablet 6 mg Nightly PRN    methocarbamoL tablet 500 mg TID PRN    naloxone 0.4 mg/mL injection 0.02 mg PRN    ondansetron injection 4 mg Q8H PRN    pantoprazole EC tablet 40 mg BID AC    predniSONE tablet 5 mg Daily    prochlorperazine injection Soln 5 mg Q6H PRN    quetiapine split tablet 12.5 mg QHS    senna-docusate 8.6-50 mg per tablet 1 tablet BID PRN    simethicone chewable tablet 80 mg QID PRN    sodium chloride  0.9% bolus 250 mL 250 mL PRN    sodium chloride 0.9% bolus 250 mL 250 mL PRN    sodium chloride 0.9% bolus 250 mL 250 mL PRN    sodium chloride 0.9% flush 10 mL PRN    sodium chloride 0.9% flush 10 mL Q6H PRN    sucralfate tablet 1 g TID PRN    tiZANidine tablet 4 mg BID PRN    traMADoL tablet 50 mg Q8H PRN    white petrolatum 41 % ointment Daily     Facility-Administered Medications Ordered in Other Encounters   Medication Frequency    celecoxib capsule 400 mg Once    fentaNYL 50 mcg/mL injection  mcg PRN    LIDOcaine (PF) 10 mg/ml (1%) injection 10 mg Once PRN    LIDOcaine (PF) 10 mg/ml (1%) injection 10 mg Once    midazolam (VERSED) 1 mg/mL injection 0.5-4 mg PRN    ropivacaine 0.2% [facility] PainPRO Pump infusion 500 ML Continuous       Objective:     Vital Signs (Most Recent):  Temp: 98.5 °F (36.9 °C) (02/03/23 0708)  Pulse: 93 (02/03/23 0708)  Resp: 18 (02/03/23 0708)  BP: 130/66 (02/03/23 0708)  SpO2: 95 % (02/03/23 0708)   Vital Signs (24h Range):  Temp:  [97 °F (36.1 °C)-98.6 °F (37 °C)] 98.5 °F (36.9 °C)  Pulse:  [] 93  Resp:  [14-20] 18  SpO2:  [95 %-99 %] 95 %  BP: (107-183)/(54-81) 130/66     Weight: 54.6 kg (120 lb 5.9 oz) (02/03/23 0400)  Body mass index is 22.74 kg/m².  Body surface area is 1.53 meters squared.    I/O last 3 completed shifts:  In: 1869 [P.O.:900; Blood:319; Other:650]  Out: 2050 [Urine:1100; Other:950]    Physical Exam  Vitals and nursing note reviewed.   Constitutional:       General: She is awake. She is not in acute distress.     Appearance: Normal appearance. She is overweight. She is not ill-appearing or diaphoretic.   HENT:      Head: Normocephalic and atraumatic.      Right Ear: External ear normal.      Left Ear: External ear normal.      Nose: Nose normal.      Mouth/Throat:      Mouth: Mucous membranes are moist.      Pharynx: Oropharynx is clear. No oropharyngeal exudate or posterior oropharyngeal erythema.   Eyes:      General: No scleral icterus.         Right eye: No discharge.         Left eye: No discharge.      Extraocular Movements: Extraocular movements intact.      Conjunctiva/sclera: Conjunctivae normal.   Cardiovascular:      Rate and Rhythm: Normal rate.      Heart sounds: Murmur heard.   Systolic murmur is present with a grade of 1/6.     No friction rub. No gallop.      Comments: Bilateral pitting lower extremity edema which extends proximally.  Pulmonary:      Effort: Pulmonary effort is normal. No respiratory distress.      Breath sounds: No wheezing, rhonchi or rales.   Chest:      Comments: Tunneled HD catheters to right chest wall.   Abdominal:      General: Bowel sounds are normal. There is no distension.      Palpations: Abdomen is soft.      Tenderness: There is no abdominal tenderness.   Musculoskeletal:      Cervical back: Neck supple.      Right lower le+ Pitting Edema present.      Left lower le+ Pitting Edema present.   Skin:     General: Skin is warm and dry.      Capillary Refill: Capillary refill takes less than 2 seconds.      Coloration: Skin is not jaundiced.      Findings: No erythema.   Neurological:      General: No focal deficit present.      Mental Status: She is alert. Mental status is at baseline.      Cranial Nerves: No cranial nerve deficit.   Psychiatric:         Mood and Affect: Mood normal.         Behavior: Behavior normal. Behavior is cooperative.       Significant Labs:  BMP:   Recent Labs   Lab 23  0409 23  1148 23  0832   GLU 86   < > 111*      < > 140   K 3.6   < > 3.5      < > 100   CO2 24   < > 28   BUN 38*   < > 64*   CREATININE 4.2*   < > 6.1*   CALCIUM 8.7   < > 8.8   MG 1.9  --   --     < > = values in this interval not displayed.       CBC:   Recent Labs   Lab 23  0832   WBC 13.03*   RBC 2.61*   HGB 7.2*   HCT 23.0*   *   MCV 88   MCH 27.6   MCHC 31.3*       CMP:   Recent Labs   Lab 23  0832   *   CALCIUM 8.8   ALBUMIN 2.4*      K 3.5    CO2 28      BUN 64*   CREATININE 6.1*       Coagulation:   Recent Labs   Lab 01/29/23  0356   APTT 62.1*       Microbiology Results (last 7 days)       ** No results found for the last 168 hours. **          Specimen (24h ago, onward)      None          Significant Imaging:  I have reviewed all imagining in the last 24 hours.    Assessment/Plan:     * Acute renal failure on dialysis  Primary pauci-immune necrotizing and crescentic glomerulonephritis  Microscopic polyangiitis  WILFREDO (acute kidney injury)  Miscroscopic polyangiitis with renal (biopsy proven pauci immune necrotizing & crescentic glomerulonephritis) and pulmonary involvement s/p Solumedrol 1,000 mg IV x3 doses, PLEX x5 sessions (10/26/2022, 10/27/2022, 10/29/2022, 10/30/2022, 11/01/2022, 11/02/2022, 11/03/2022), Rituximab 375 mg/m^2 x4 (600 mg) on 10/27/2022, 11/03/2922,11/10/2022,11/172022) with cyclophosphamide 7.5 mg/kg on 10/27/2022 and 11/10/2022 (every 14 days). Now iHD for which she receives HD on one but usually twice weekly for metabolic clearance via tunneled HD catheter roughly x2 a week with last HD Friday (01/06/2023).    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  2)  Necrotizing cresentic glomerulonephritis/[id number] necrotizing cresecentic glomerulonephritis.  3)  Focal acute pyelonephritis.  4)  Mild-to-moderate arterionephrosclerosis    Plan/Recommendations:  - patient being discharged today , HD tomorrow as outpatient at [facility]  - outpatient nephrologist patient closely for signs of renal recovery as she has previously only been dialyzed once to twice weekly and has only received two HD sessions while inpatient this admission in addition to immunosuppressive management    - can  continue Lasix 40 mg PO daily  - currently on prednisone 5 mg daily with atovaquone 1.5 grams faily for PJP prophylaxis     Acute deep vein thrombosis (DVT) of non-extremity vein - subclavian  - management per primary team  - transitioned to Eliquis on 1/26    Bacteremia due to Enterococcus  - management per ID and primary  - repeat blood cultures have remained without growth to date    Urinary tract infection due to extended-spectrum beta lactamase (ESBL) producing Escherichia coli  - Infectious Disease consulted this admission  - management per primary team    Anemia due to chronic kidney disease  - goal hemoglobin 10-12,   - most recent iron panel with iron 15, transferrin 138, TIBC 204, 7% saturation and ferritin 378  - transfuse for hemoglobin < 7.0  - currently receiving EPO 4,000 units SQ with HD  - IV iron at outpatient unit if warranted, no longer with bacteremia     Primary hypertension  - management per primary team  - she is currently on Norvasc 10 mg daily, Coreg 12.5 mg BID and hydralazine 50 mg Q8H    Hypothyroid  - management per primary team    Thank you for your consult. I will sign off. Please contact us if you have any additional questions.    Pj Paz MD  Nephrology  [facility] ([place])
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
## 2023-02-03 NOTE — PLAN OF CARE
Problem: Occupational Therapy  Goal: Occupational Therapy Goal  Description: Goals to be met by: 1/17/2023 (1 week)  Goals remain appropriate  and extended to 01 31--23    Patient will increase functional independence with ADLs by performing:    UE Dressing with [place].  LE Dressing with [place].  Grooming while standing at sink with [place].  Toileting from toilet with [place] for hygiene and clothing management.   Step transfer with [place]  Toilet transfer to toilet with [place].    Outcome: Adequate for Care Transition

## 2023-02-03 NOTE — NURSING
Standing weight done on patient this am.  Assisted patient to bathroom with walker , yellow socks, and slippers on.   Patient in bathroom.  Nurse in room .  Instructed patient to hold on support bar and let me know when done.  Nurse placed clean pad on bed and AM nurse walked in room to do bedside handoff.  (Patient leaned left to stand up.)  Patient attempted to stand up before I could assist her, and the bedside commode that was placed over toilet fell left when she stood, the commode went left toward the sink.  The patient fell to floor landing on her butt.  She stated she did not hit her head/ just fell on her butt.  Day and night charge nurses in room.  MD notified.  Vital Signs done.  Daughter in route to hospital.  Bedside commode removed from bathroom.

## 2023-02-03 NOTE — SUBJECTIVE & OBJECTIVE
Interval History: Patient seen and examined this AM. Daughter at bedside. Overnight feel from bedside commode. Denies any physical complaints this morning apart from desire to discharge and tolerated HD yesterday without issue. Afebrile overnight with pulse ranging from 90-70s bpm. Systolic blood pressures ranging from 140-130s mmHg. She is saturating +95% on room air with 1.1 liters in the past 24 hours. Tentative plans to be discharged today per primary team.    Review of patient's allergies indicates:   Allergen Reactions    Ampicillin     Peaches [peach (prunus persica)] Other (See Comments)     Pt unable to state type of reaction. Information obtained from daughter who states she was informed of allergy from patient.    Penicillins      Other reaction(s): Hives, anaphylaxis    Sulfa (sulfonamide antibiotics) Rash and Hives     Current Facility-Administered Medications   Medication Frequency    0.9%  NaCl infusion (for blood administration) Q24H PRN    0.9%  NaCl infusion Once    0.9%  NaCl infusion Once    acetaminophen tablet 650 mg Q4H PRN    albuterol-ipratropium 2.5 mg-0.5 mg/3 mL nebulizer solution 3 mL Q4H PRN    aluminum-magnesium hydroxide 200-200 mg/5 mL suspension 30 mL QID PRN    amLODIPine tablet 10 mg Daily    apixaban tablet 5 mg BID    atovaquone 750 mg/5 mL oral liquid 1,500 mg Daily    B-complex with vitamin C tablet 1 tablet Daily    bisacodyL suppository 10 mg Daily PRN    carvediloL tablet 12.5 mg BID    cloNIDine tablet 0.1 mg Q8H PRN    epoetin hira injection 4,000 Units Every Tues, Thurs, Sat    fluticasone propionate 50 mcg/actuation nasal spray 50 mcg BID    furosemide tablet 40 mg Daily    heparin (porcine) injection 1,000 Units PRN    hydrALAZINE tablet 50 mg Q8H    levothyroxine tablet 25 mcg Before breakfast    LIDOcaine 5 % patch 1 patch Q24H    loperamide capsule 2 mg QID PRN    magnesium oxide tablet 400 mg Daily    meclizine tablet 25 mg TID PRN    melatonin tablet 6 mg Nightly  PRN    methocarbamoL tablet 500 mg TID PRN    naloxone 0.4 mg/mL injection 0.02 mg PRN    ondansetron injection 4 mg Q8H PRN    pantoprazole EC tablet 40 mg BID AC    predniSONE tablet 5 mg Daily    prochlorperazine injection Soln 5 mg Q6H PRN    quetiapine split tablet 12.5 mg QHS    senna-docusate 8.6-50 mg per tablet 1 tablet BID PRN    simethicone chewable tablet 80 mg QID PRN    sodium chloride 0.9% bolus 250 mL 250 mL PRN    sodium chloride 0.9% bolus 250 mL 250 mL PRN    sodium chloride 0.9% bolus 250 mL 250 mL PRN    sodium chloride 0.9% flush 10 mL PRN    sodium chloride 0.9% flush 10 mL Q6H PRN    sucralfate tablet 1 g TID PRN    tiZANidine tablet 4 mg BID PRN    traMADoL tablet 50 mg Q8H PRN    white petrolatum 41 % ointment Daily     Facility-Administered Medications Ordered in Other Encounters   Medication Frequency    celecoxib capsule 400 mg Once    fentaNYL 50 mcg/mL injection  mcg PRN    LIDOcaine (PF) 10 mg/ml (1%) injection 10 mg Once PRN    LIDOcaine (PF) 10 mg/ml (1%) injection 10 mg Once    midazolam (VERSED) 1 mg/mL injection 0.5-4 mg PRN    ropivacaine 0.2% [facility] PainPRO Pump infusion 500 ML Continuous       Objective:     Vital Signs (Most Recent):  Temp: 98.5 °F (36.9 °C) (02/03/23 0708)  Pulse: 93 (02/03/23 0708)  Resp: 18 (02/03/23 0708)  BP: 130/66 (02/03/23 0708)  SpO2: 95 % (02/03/23 0708)   Vital Signs (24h Range):  Temp:  [97 °F (36.1 °C)-98.6 °F (37 °C)] 98.5 °F (36.9 °C)  Pulse:  [] 93  Resp:  [14-20] 18  SpO2:  [95 %-99 %] 95 %  BP: (107-183)/(54-81) 130/66     Weight: 54.6 kg (120 lb 5.9 oz) (02/03/23 0400)  Body mass index is 22.74 kg/m².  Body surface area is 1.53 meters squared.    I/O last 3 completed shifts:  In: 1869 [P.O.:900; Blood:319; Other:650]  Out: 2050 [Urine:1100; Other:950]    Physical Exam  Vitals and nursing note reviewed.   Constitutional:       General: She is awake. She is not in acute distress.     Appearance: Normal appearance. She is  overweight. She is not ill-appearing or diaphoretic.   HENT:      Head: Normocephalic and atraumatic.      Right Ear: External ear normal.      Left Ear: External ear normal.      Nose: Nose normal.      Mouth/Throat:      Mouth: Mucous membranes are moist.      Pharynx: Oropharynx is clear. No oropharyngeal exudate or posterior oropharyngeal erythema.   Eyes:      General: No scleral icterus.        Right eye: No discharge.         Left eye: No discharge.      Extraocular Movements: Extraocular movements intact.      Conjunctiva/sclera: Conjunctivae normal.   Cardiovascular:      Rate and Rhythm: Normal rate.      Heart sounds: Murmur heard.   Systolic murmur is present with a grade of 1/6.     No friction rub. No gallop.      Comments: Bilateral pitting lower extremity edema which extends proximally.  Pulmonary:      Effort: Pulmonary effort is normal. No respiratory distress.      Breath sounds: No wheezing, rhonchi or rales.   Chest:      Comments: Tunneled HD catheters to right chest wall.   Abdominal:      General: Bowel sounds are normal. There is no distension.      Palpations: Abdomen is soft.      Tenderness: There is no abdominal tenderness.   Musculoskeletal:      Cervical back: Neck supple.      Right lower le+ Pitting Edema present.      Left lower le+ Pitting Edema present.   Skin:     General: Skin is warm and dry.      Capillary Refill: Capillary refill takes less than 2 seconds.      Coloration: Skin is not jaundiced.      Findings: No erythema.   Neurological:      General: No focal deficit present.      Mental Status: She is alert. Mental status is at baseline.      Cranial Nerves: No cranial nerve deficit.   Psychiatric:         Mood and Affect: Mood normal.         Behavior: Behavior normal. Behavior is cooperative.       Significant Labs:  BMP:   Recent Labs   Lab 23  0409 23  1148 23  0832   GLU 86   < > 111*      < > 140   K 3.6   < > 3.5      < > 100    CO2 24   < > 28   BUN 38*   < > 64*   CREATININE 4.2*   < > 6.1*   CALCIUM 8.7   < > 8.8   MG 1.9  --   --     < > = values in this interval not displayed.       CBC:   Recent Labs   Lab 02/02/23  0832   WBC 13.03*   RBC 2.61*   HGB 7.2*   HCT 23.0*   *   MCV 88   MCH 27.6   MCHC 31.3*       CMP:   Recent Labs   Lab 02/02/23  0832   *   CALCIUM 8.8   ALBUMIN 2.4*      K 3.5   CO2 28      BUN 64*   CREATININE 6.1*       Coagulation:   Recent Labs   Lab 01/29/23  0356   APTT 62.1*       Microbiology Results (last 7 days)       ** No results found for the last 168 hours. **          Specimen (24h ago, onward)      None          Significant Imaging:  I have reviewed all imagining in the last 24 hours.

## 2023-02-03 NOTE — ASSESSMENT & PLAN NOTE
- goal hemoglobin 10-12,   - most recent iron panel with iron 15, transferrin 138, TIBC 204, 7% saturation and ferritin 378  - transfuse for hemoglobin < 7.0  - currently receiving EPO 4,000 units SQ with HD  - IV iron at outpatient unit if warranted, no longer with bacteremia

## 2023-02-03 NOTE — PLAN OF CARE
[facility] scheduled pcp f/u appt   Future Appointments   Date Time Provider Department Center   2/4/2023  7:15 AM DIALYSIS, TREVOR ESTES [facility] DLYS [facility]   2/9/2023 11:00 AM Lori Hernandez MD [facility] MED [place]   2/23/2023 11:00 AM Layne Aguirre MD [facility] RHEUM [facility]

## 2023-02-03 NOTE — DISCHARGE SUMMARY
[facility] - Intensive Care ([place] [address])  [facility] Medicine  Discharge Summary      Patient Name: Kristin Goodman  MRN: [id number]  [facility]: [id number]  Patient Class: IP- Inpatient  Admission Date: 1/9/2023  Hospital Length of Stay: 25 days  Discharge Date and Time:  02/03/2023 11:40 AM  Attending Physician: Madie Lancaster MD   Discharging Provider: Madie Lancaster MD  Primary Care Provider: Lori Hernandez MD  Hospital Medicine Team: [facility] HOSP MED Q Madie Lancaster MD  Primary Care Team: [facility] HOSP MED Q    HPI:   77-year-old  woman with HTN, hypothyroidism, HFpEF, and osteoarthritis and new acute renal failure due to new diagnosis of Microscopic Polyangiitis s/p renal biopsy and requiring hemodialysis is transferred from [facility] for concerns of new bacteremia.     She was hospitalized from 10/17/22-12/13/22 then transferred to [facility].  Microscopic polyangiitis with pulmonary and renal involvement had suffered respiratory failure with diffuse alveolar hemorrhage, gi bleeding, and renal replacement therapy. S/p Rheumatology consultation and pulse IV steroids + plasmapheresis with Transfusion medicine started on Rituximab and Cyclophosphamide.  Continues on Steroid taper for immunosuppression.     More recently earlier this week noted to have a urine culture grow ESBL E.coli Cystitis, started on meropenem, then she had blood cultures from 1/5 and 1/7 that have grown Enterococcus (amp sensitive) vancomycin started today, patient had sluggish HD catheter with dialysis.   Also ED report that patient may have had syncope during HD today.     She is transferred for continued infectious workup and management as well as removal of tunneled HD line for line holiday.       * No surgery found *      Hospital Course:   Patient finished the course of IV Abx for bacteremia,.Seen by ID and nephrology. Plan for line holiday and IR consulted.has been started on HD,SW consulted for  out  patient HD arrangement.she did well with HD and remains stable.  HH was lower,,improved with pack of RBC,  PT,OT was consulted and recommended HH and DME  Out patient HD is arranged,patient was discharged home with HH and DME and follow up with PCP and nephrology as out patient,.       Goals of Care Treatment Preferences:  Code Status: Full Code      Consults:   Consults (From admission, onward)        Status Ordering Provider     Inpatient consult to [facility] team [facility]  Once        Provider:  (Not yet assigned)    Completed NORI PAEZ     Inpatient virtual consult to Hospital Medicine  Once        Provider:  (Not yet assigned)    Completed CARMINA AUSTIN     Inpatient consult to Interventional Radiology  Once        Provider:  (Not yet assigned)    Completed KENNA, SEEMAL     Inpatient consult to Interventional Radiology  Once        Provider:  (Not yet assigned)    Completed KD HO     Inpatient virtual consult to Hospital Medicine  Once        Provider:  (Not yet assigned)    Completed KENNA, SEEMAL     Inpatient consult to Midline team  Once        Provider:  (Not yet assigned)    Completed MANALAC, COCO B     Inpatient consult to PICC team ([facility])  Once        Provider:  (Not yet assigned)    Completed MANALAC, COCO B     Inpatient consult to Interventional Radiology  Once        Provider:  Serafin Joseph MD    Completed MANALAC, COCO B     Inpatient consult to Physical Medicine Rehab  Once        Provider:  (Not yet assigned)    Completed MANALAC COCO B     Hospital Medicine PharmD Consult  Once        Provider:  (Not yet assigned)    Completed JOSÉ LUIS SÁNCHEZ     Inpatient consult to Interventional Radiology  Once        Provider:  (Not yet assigned)    Completed BELA ROSALES     Inpatient consult to Nephrology  Once        Provider:  (Not yet assigned)    Completed BELA ROSALES     Inpatient consult to Infectious Diseases  Once        Provider:  (Not yet  assigned)    Completed BELA ROSALES          No new Assessment & Plan notes have been filed under this hospital service since the last note was generated.  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 Yes    Bacteremia due to Enterococcus [R78.81, B95.2] 01/09/2023 Yes    Urinary tract infection due to extended-spectrum beta lactamase (ESBL) producing Escherichia coli [N39.0, B96.29, Z16.12] 12/13/2022 Yes    Anemia due to chronic kidney disease [N18.9, D63.1] 12/01/2022 Yes    Gastric reflux [K21.9] 11/10/2022 Yes    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, 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] 11/10/2022 Yes    Dysphagia [R13.10] 11/02/2022 Yes    Microscopic polyangiitis [M31.7] 10/26/2022 Yes    Impaired mobility [Z74.09] 10/06/2021 Yes    Chronic diastolic heart failure [I50.32] 08/31/2020 Yes    Syncope [R55] 08/13/2019 Yes    Primary hypertension [I10]  Yes    Hypothyroid [E03.9]  Yes      Problems Resolved During this Admission:    Diagnosis Date Noted Date Resolved POA    Discharge planning  [Z02.9] 01/19/2023 01/20/2023 Not Applicable       Discharged Condition: stable    Disposition: Home or Self Care    Follow Up:   Follow-up Information     Lori Hernandez MD Follow up in 3 day(s).    Specialties: Family Medicine, Internal Medicine  Contact information:  [address] [place] [address] [phone]             Lori Hernandez MD Follow up in 1 week(s).    Specialties: Family Medicine, Internal Medicine  Contact information:  [address] [place] [address] [phone]             [facility]. Go on 2/4/2023.   "  Why: at 12:00pm for 1st day dialysis  Contact information:  [address]  [facility] [address] [phone]           [facility] [employer], [facility] Follow up.    Specialties: Home Health Services, Home Therapy Services, Home Living Aide Services  Why: Home Health  Contact information:  [address], [address]  [facility] [address]  [phone]                     Patient Instructions:      COMMODE FOR HOME USE     Order Specific Question Answer Comments   Type: Standard    Height: 5' 1" (1.549 m)    Weight: 54.6 kg (120 lb 5.9 oz)    Does patient have medical equipment at home? walker, rolling    Length of need (1-99 months): 99      Ambulatory referral/consult to Nephrology   Standing Status: Future   Referral Priority: Routine Referral Type: Consultation   Referral Reason: Specialty Services Required   Requested Specialty: Nephrology   Number of Visits Requested: 1     Ambulatory referral/consult to Infectious Disease   Standing Status: Future   Referral Priority: Routine Referral Type: Consultation   Referral Reason: Specialty Services Required   Requested Specialty: Infectious Diseases   Number of Visits Requested: 1     Activity as tolerated       Significant Diagnostic Studies: Labs:   BMP:   Recent Labs   Lab 02/02/23  0832   *      K 3.5      CO2 28   BUN 64*   CREATININE 6.1*   CALCIUM 8.8   , CMP   Recent Labs   Lab 02/02/23  0832      K 3.5      CO2 28   *   BUN 64*   CREATININE 6.1*   CALCIUM 8.8   ALBUMIN 2.4*   ANIONGAP 12    and CBC   Recent Labs   Lab 02/01/23  2347 02/02/23  0832   WBC 12.25 13.03*   HGB 6.9* 7.2*   HCT 22.2* 23.0*   * 510*     Radiology: X-Ray: CXR: X-Ray Chest 1 View (CXR): No results found for this visit on 01/09/23. and X-Ray Chest PA and Lateral (CXR):   Results for orders placed or performed during the hospital encounter of 01/09/23   X-Ray Chest PA And Lateral    Narrative    EXAMINATION:  XR CHEST " PA AND LATERAL    CLINICAL HISTORY:  requested by IR;    TECHNIQUE:  PA and lateral views of the chest were performed.    COMPARISON:  01/05/2023    FINDINGS:  The dialysis catheter remains in position.  The heart is not enlarged the trachea is midline.  Bilateral effusions are present with no evidence of significant infiltrate or consolidation.  Mild compressive type atelectasis may be present.      Impression    Interval development of bilateral effusions and lower lobe atelectasis.      Electronically signed by: Emil Camp  Date:    01/20/2023  Time:    17:26       Pending Diagnostic Studies:     Procedure Component Value Units Date/Time    IR Tunneled Cath Removal w/o Port [[id number]] Resulted: 01/27/23 1647    Order Status: Sent Lab Status: In process Updated: 01/27/23 1648         Medications:  Reconciled Home Medications:      Medication List      START taking these medications    atovaquone 750 mg/5 mL Susp oral liquid  Commonly known as: MEPRON  Take 10 mLs (1,500 mg total) by mouth once daily.     carvediloL 12.5 MG tablet  Commonly known as: COREG  Take 1 tablet (12.5 mg total) by mouth 2 (two) times daily.     ELIQUIS 5 mg Tab  Generic drug: apixaban  Take 1 tablet (5 mg total) by mouth 2 (two) times daily.     furosemide 40 MG tablet  Commonly known as: LASIX  Take 1 tablet (40 mg total) by mouth once daily.     predniSONE 5 MG tablet  Commonly known as: DELTASONE  Take 1 tablet (5 mg total) by mouth once daily.     QUEtiapine 25 MG Tab  Commonly known as: SEROQUEL  Take 1 tablet (25 mg total) by mouth every evening.        CHANGE how you take these medications    hydrALAZINE 50 MG tablet  Commonly known as: APRESOLINE  Take 1 tablet (50 mg total) by mouth every 8 (eight) hours.  What changed:   · medication strength  · how much to take  · when to take this     levothyroxine 25 MCG tablet  Commonly known as: EUTHYROX  Take 1 tablet (25 mcg total) by mouth once daily.  What changed:   · how much to  take  · when to take this        CONTINUE taking these medications    albuterol 90 mcg/actuation inhaler  Commonly known as: VENTOLIN HFA  Inhale 2 puffs into the lungs every 6 (six) hours as needed for Shortness of Breath. Rescue     amLODIPine 10 MG tablet  Commonly known as: NORVASC  Take 1 tablet (10 mg total) by mouth once daily.     epinastine 0.05 % ophthalmic solution  Place 1 drop into both eyes once daily.     fish,bora,flax oils-om3,6,9no1 1,200 mg Cap  Commonly known as: OMEGA 3-6-9  Take 1 each by mouth once daily.     fluticasone propionate 50 mcg/actuation nasal spray  Commonly known as: FLONASE  1 spray (50 mcg total) by Each Nostril route 2 (two) times daily.     FLUZONE HIGHDOSE QUAD 20-21  mcg/0.7 mL Syrg  Generic drug: flu vacc ab6061-49(65yr up)-PF  ADM 0.7ML IM UTD     levocetirizine 5 MG tablet  Commonly known as: XYZAL  Take 1 tablet (5 mg total) by mouth every evening. For sinus     meloxicam 15 MG tablet  Commonly known as: MOBIC  Take 1 tablet (15 mg total) by mouth daily as needed (arthritis).     methocarbamoL 500 MG Tab  Commonly known as: ROBAXIN  Take 1 tablet (500 mg total) by mouth 3 (three) times daily as needed (muscle spasms).     mupirocin 2 % ointment  Commonly known as: BACTROBAN  Apply topically 2 (two) times daily.     TYLENOL ARTHRITIS PAIN ORAL  Take 1 tablet by mouth once daily.        STOP taking these medications    aspirin 325 MG tablet     diclofenac sodium 1 % Gel  Commonly known as: VOLTAREN     HYDROcodone-acetaminophen 5-325 mg per tablet  Commonly known as: NORCO     ibuprofen 800 MG tablet  Commonly known as: ADVIL,MOTRIN     lisinopriL 40 MG tablet  Commonly known as: PRINIVIL,ZESTRIL     metoprolol succinate 50 MG 24 hr tablet  Commonly known as: TOPROL-XL     tiZANidine 4 MG tablet  Commonly known as: ZANAFLEX            Indwelling Lines/Drains at time of discharge:   Lines/Drains/Airways     Central Venous Catheter Line  Duration                 Hemodialysis Catheter 01/25/23 1001 left internal jugular 9 days                Time spent on the discharge of patient: over 30  minutes         Madie Lancaster MD  Department of Hospital Medicine  [facility] - Intensive Care ([place])

## 2023-02-03 NOTE — PLAN OF CARE
WYATT received call from Yasmin with [facility] [phone] who confirmed patient's chair time Tuesday, Thursday, Saturday 12:50pm with 1st day Feb 4, 2023 at 12:00pm.  WYATT sent careport message to [facility] to inform of discharge today.  WYATT met with patient at bedside to discuss dialysis and give welcome letter from [facility].       02/03/23 1051   Post-Acute Status   Post-Acute Authorization Home Health;Dialysis   Home Health Status Set-up Complete/Auth obtained   Diaylsis Status Set-up Complete/Auth obtained

## 2023-02-03 NOTE — PLAN OF CARE
Per Naida VELARDE with [facility], patient not eligible for bedside commode.       02/03/23 1036   Post-Acute Status   Post-Acute Authorization [facility]

## 2023-02-03 NOTE — PLAN OF CARE
Problem: Adult Inpatient Plan of Care  Goal: Plan of Care Review  Outcome: Met  Goal: Patient-Specific Goal (Individualized)  Outcome: Met  Goal: Absence of Hospital-Acquired Illness or Injury  Outcome: Met  Goal: Optimal Comfort and Wellbeing  Outcome: Met  Goal: Readiness for Transition of Care  Outcome: Met     Problem: Renal Function Impairment (Acute Kidney Injury/Impairment)  Goal: Effective Renal Function  Outcome: Met     Problem: Fall Injury Risk  Goal: Absence of Fall and Fall-Related Injury  Outcome: Ongoing, Not Progressing   Pt being discharged home per MD orders.  VSS, pt afebrile and no c/o pain at this time.  Reviewed discharge instructions, follow-up's and meds with pt. PIV removed, gauze/tape placed to site.  All personal belongings packed and with pt at bedside.  Ride to be provided by pt's daughter. w/c transport upon discharge.

## 2023-02-03 NOTE — PLAN OF CARE
Patient cleared for discharge from case management standpoint.       02/03/23 1053   Final Note   Assessment Type Final Discharge Note   Anticipated Discharge Disposition Home-Health   What phone number can be called within the next 1-3 days to see how you are doing after discharge? [phone]   Hospital Resources/Appts/Education Provided Post-Acute resouces added to AVS;Provided education on problems/symptoms using teachback;Appointments scheduled and added to AVS;Provided patient/caregiver with written discharge plan information;Community resources provided

## 2023-02-03 NOTE — ASSESSMENT & PLAN NOTE
- management per primary team  - she is currently on Norvasc 10 mg daily, Coreg 12.5 mg BID and hydralazine 50 mg Q8H

## 2023-02-03 NOTE — PLAN OF CARE
SW spoke to [facility] admissions [phone] who stated they are with shift change and will return call with  update.       02/03/23 1000   Post-Acute Status   Post-Acute Authorization Dialysis   Post-Acute Placement Status Pending post-acute provider review/more information requested

## 2023-02-06 ENCOUNTER — PATIENT OUTREACH (OUTPATIENT)
Dept: ADMINISTRATIVE | Facility: CLINIC | Age: 78
End: 2023-02-06
Payer: MEDICARE

## 2023-02-08 PROCEDURE — G0180 MD CERTIFICATION HHA PATIENT: HCPCS | Mod: ,,, | Performed by: HOSPITALIST

## 2023-02-08 PROCEDURE — G0180 PR HOME HEALTH MD CERTIFICATION: ICD-10-PCS | Mod: ,,, | Performed by: HOSPITALIST

## 2023-02-10 ENCOUNTER — OFFICE VISIT (OUTPATIENT)
Dept: FAMILY MEDICINE | Facility: CLINIC | Age: 78
End: 2023-02-10
Payer: MEDICARE

## 2023-02-10 VITALS
RESPIRATION RATE: 16 BRPM | TEMPERATURE: 98 F | HEIGHT: 61 IN | WEIGHT: 117.75 LBS | BODY MASS INDEX: 22.23 KG/M2 | OXYGEN SATURATION: 99 % | SYSTOLIC BLOOD PRESSURE: 130 MMHG | HEART RATE: 88 BPM | DIASTOLIC BLOOD PRESSURE: 72 MMHG

## 2023-02-10 DIAGNOSIS — Z99.2 ACUTE RENAL FAILURE ON DIALYSIS: ICD-10-CM

## 2023-02-10 DIAGNOSIS — M31.7 MICROSCOPIC POLYANGIITIS: ICD-10-CM

## 2023-02-10 DIAGNOSIS — I50.32 CHRONIC DIASTOLIC HEART FAILURE: ICD-10-CM

## 2023-02-10 DIAGNOSIS — N17.9 ACUTE RENAL FAILURE ON DIALYSIS: ICD-10-CM

## 2023-02-10 DIAGNOSIS — Z09 HOSPITAL DISCHARGE FOLLOW-UP: Primary | ICD-10-CM

## 2023-02-10 DIAGNOSIS — N30.00 ACUTE CYSTITIS WITHOUT HEMATURIA: ICD-10-CM

## 2023-02-10 PROCEDURE — 99214 OFFICE O/P EST MOD 30 MIN: CPT | Mod: S$GLB,,, | Performed by: INTERNAL MEDICINE

## 2023-02-10 PROCEDURE — 1101F PT FALLS ASSESS-DOCD LE1/YR: CPT | Mod: CPTII,S$GLB,, | Performed by: INTERNAL MEDICINE

## 2023-02-10 PROCEDURE — 1159F PR MEDICATION LIST DOCUMENTED IN MEDICAL RECORD: ICD-10-PCS | Mod: CPTII,S$GLB,, | Performed by: INTERNAL MEDICINE

## 2023-02-10 PROCEDURE — 3078F PR MOST RECENT DIASTOLIC BLOOD PRESSURE < 80 MM HG: ICD-10-PCS | Mod: CPTII,S$GLB,, | Performed by: INTERNAL MEDICINE

## 2023-02-10 PROCEDURE — 99999 PR PBB SHADOW E&M-EST. PATIENT-LVL V: ICD-10-PCS | Mod: PBBFAC,,, | Performed by: INTERNAL MEDICINE

## 2023-02-10 PROCEDURE — 99214 PR OFFICE/OUTPT VISIT, EST, LEVL IV, 30-39 MIN: ICD-10-PCS | Mod: S$GLB,,, | Performed by: INTERNAL MEDICINE

## 2023-02-10 PROCEDURE — 1101F PR PT FALLS ASSESS DOC 0-1 FALLS W/OUT INJ PAST YR: ICD-10-PCS | Mod: CPTII,S$GLB,, | Performed by: INTERNAL MEDICINE

## 2023-02-10 PROCEDURE — 1111F DSCHRG MED/CURRENT MED MERGE: CPT | Mod: CPTII,S$GLB,, | Performed by: INTERNAL MEDICINE

## 2023-02-10 PROCEDURE — 3288F FALL RISK ASSESSMENT DOCD: CPT | Mod: CPTII,S$GLB,, | Performed by: INTERNAL MEDICINE

## 2023-02-10 PROCEDURE — 3078F DIAST BP <80 MM HG: CPT | Mod: CPTII,S$GLB,, | Performed by: INTERNAL MEDICINE

## 2023-02-10 PROCEDURE — 1159F MED LIST DOCD IN RCRD: CPT | Mod: CPTII,S$GLB,, | Performed by: INTERNAL MEDICINE

## 2023-02-10 PROCEDURE — 1160F RVW MEDS BY RX/DR IN RCRD: CPT | Mod: CPTII,S$GLB,, | Performed by: INTERNAL MEDICINE

## 2023-02-10 PROCEDURE — 1160F PR REVIEW ALL MEDS BY PRESCRIBER/CLIN PHARMACIST DOCUMENTED: ICD-10-PCS | Mod: CPTII,S$GLB,, | Performed by: INTERNAL MEDICINE

## 2023-02-10 PROCEDURE — 99999 PR PBB SHADOW E&M-EST. PATIENT-LVL V: CPT | Mod: PBBFAC,,, | Performed by: INTERNAL MEDICINE

## 2023-02-10 PROCEDURE — 1111F PR DISCHARGE MEDS RECONCILED W/ CURRENT OUTPATIENT MED LIST: ICD-10-PCS | Mod: CPTII,S$GLB,, | Performed by: INTERNAL MEDICINE

## 2023-02-10 PROCEDURE — 3288F PR FALLS RISK ASSESSMENT DOCUMENTED: ICD-10-PCS | Mod: CPTII,S$GLB,, | Performed by: INTERNAL MEDICINE

## 2023-02-10 PROCEDURE — 3075F PR MOST RECENT SYSTOLIC BLOOD PRESS GE 130-139MM HG: ICD-10-PCS | Mod: CPTII,S$GLB,, | Performed by: INTERNAL MEDICINE

## 2023-02-10 PROCEDURE — 3075F SYST BP GE 130 - 139MM HG: CPT | Mod: CPTII,S$GLB,, | Performed by: INTERNAL MEDICINE

## 2023-02-10 NOTE — PROGRESS NOTES
Health Maintenance Due   Topic     Shingles Vaccine (1 of 2) hx chickenpox ; inform pt can get vaccine at pharmacy.    High Dose Statin      COVID-19 Vaccine (4 - Booster for Moderna series)

## 2023-02-10 NOTE — PROGRESS NOTES
"Subjective:       Patient ID: Kristin Goodman is a pleasant 77 y.o. Black or  female patient    Chief Complaint: Follow-up (Hospital f/u)      Patient is a pt I saw last on 05/27/2022. See my last notes and the list of problems below.    HPI    Pt with long and complicated PMH as per list of problems below who comes today for a hospital discharge f-up visit.    As per discharge notes:  "She was admitted from 10/17/2022-12/13/2022 for microscopic polyangiitis with pulmonary and renal involvement, suffered respiratory failure with diffuse alveolar hemorrhage, gi bleeding, and underwent renal replacement therapy. S/p Rheumatology consultation and pulse IV steroids + plasmapheresis with Transfusion medicine started on Rituximab and Cyclophosphamide.  Continues on steroid taper for immunosuppression. Went then to [facility].  More recent admission from 01/09 to 02/03 due to a urine culture growing ESBL E.coli, started on meropenem, blood cultures from 1/5 and 1/7 grew Enterococcus (amp sensitive), vancomycin started, patient had sluggish HD catheter with dialysis.     Patient finished the course of IV Abx for bacteremia,.Seen by ID and nephrology. Plan for line holiday and IR consulted. Was started on HD,SW consulted for outpatient HD arrangement. she did well with HD and remains stable.  HH was lower,improved with pack of RBC,  PT,OT was consulted and recommended HH and DME  Outpatient HD is arranged,patient was discharged home with HH and DME and follow up with PCP and nephrology as out patient".  She is here with her sister, she is frail and has a walker. However reports feeling a bit better, has PT at home that helps.  She goes to dialysis (line L upper) TIW, her Nephrologist is Dr. Perez. She states he saw her on Tuesday.  She does not bring her pill bottles today.  Her sister reports that she only takes amlodipine, even though she has an extensive list of medications as per discharge " documents.  Her sister is vehement to state that they were told to hold all meds except CCB.    Patient Active Problem List   Diagnosis    Hypothyroid    Primary hypertension    Trochanteric bursitis of right hip    Mitral valve regurgitation    Chest pain    Syncope    Inflammatory spondylopathy    CAREY (dyspnea on exertion)    Chronic diastolic heart failure    Colon cancer screening    DONAVAN (obstructive sleep apnea)    Sleep arousal disorder    Acute medial meniscal tear, right, initial encounter    Decreased range of motion (ROM) of right knee    Quadriceps weakness    Weakness of right hip    Impaired mobility    Acute medial meniscal injury of right knee    S/P meniscectomy    Atherosclerosis of renal artery    Pain in both wrists    Decreased range of motion of both wrists    Decreased  strength    Alteration in instrumental activities of daily living (IADL)    Other specified anemias    Lymphadenopathy of head and neck    Microscopic polyangiitis    Acute renal failure on dialysis    Moderate malnutrition    Dysphagia    High risk medications (not anticoagulants) long-term use    Gastric reflux    Primary pauci-immune necrotizing and crescentic glomerulonephritis    Hematuria syndrome    Dependence on hemodialysis    Anemia due to chronic kidney disease    Dizzy spells    Urinary tract infection due to extended-spectrum beta lactamase (ESBL) producing Escherichia coli    Bacteremia due to Enterococcus    Pauci-immune vasculitis    Acute deep vein thrombosis (DVT) of non-extremity vein - subclavian    WILFREDO (acute kidney injury)          ACTIVE MEDICAL ISSUES:  Documented in Problem List     PAST MEDICAL HISTORY  Documented     PAST SURGICAL HISTORY:  Documented     SOCIAL HISTORY:  Documented     FAMILY HISTORY:  Documented     ALLERGIES AND MEDICATIONS: updated and reviewed.  Documented    Review of Systems   Constitutional:  Positive for activity change and fatigue. Negative for appetite change, chills,  "fever and unexpected weight change.   HENT:  Negative for congestion, ear discharge, ear pain, facial swelling, postnasal drip, rhinorrhea, sinus pressure, sinus pain, sneezing and sore throat.    Eyes: Negative.    Respiratory:  Negative for cough, chest tightness, shortness of breath and wheezing.    Cardiovascular: Negative.    Gastrointestinal:  Positive for diarrhea. Negative for abdominal pain, blood in stool, constipation, nausea and vomiting.   Genitourinary: Negative.    Allergic/Immunologic: Negative for environmental allergies.   Neurological: Negative.      Objective:      Physical Exam  Vitals and nursing note reviewed.   Constitutional:       Appearance: She is normal weight.      Comments: Frail, has a rollator.   HENT:      Right Ear: Tympanic membrane normal.      Left Ear: Tympanic membrane normal.   Eyes:      Conjunctiva/sclera: Conjunctivae normal.   Cardiovascular:      Rate and Rhythm: Normal rate and regular rhythm.      Pulses: Normal pulses.      Heart sounds: Normal heart sounds.      Comments: Iv HD line L upper  Pulmonary:      Effort: Pulmonary effort is normal.      Breath sounds: Normal breath sounds.   Abdominal:      General: Bowel sounds are normal.   Skin:     General: Skin is warm and dry.   Neurological:      Mental Status: She is alert.   Psychiatric:         Mood and Affect: Mood normal.         Behavior: Behavior normal.         Thought Content: Thought content normal.         Judgment: Judgment normal.       Vitals:    02/10/23 1024   BP: 130/72   BP Location: Left arm   Patient Position: Sitting   BP Method: Small (Automatic)   Pulse: 88   Resp: 16   Temp: 98.4 °F (36.9 °C)   TempSrc: Oral   SpO2: 99%   Weight: 53.4 kg (117 lb 11.6 oz)   Height: 5' 1" (1.549 m)     Body mass index is 22.24 kg/m².    RESULTS: Reviewed labs from last 12 months    Last Lab Results:     Lab Results   Component Value Date    WBC 13.03 (H) 02/02/2023    HGB 7.2 (L) 02/02/2023    HCT 23.0 (L) " 02/02/2023     (H) 02/02/2023     02/02/2023    K 3.5 02/02/2023     02/02/2023    CO2 28 02/02/2023    BUN 64 (H) 02/02/2023    CREATININE 6.1 (H) 02/02/2023    CALCIUM 8.8 02/02/2023    ALBUMIN 2.4 (L) 02/02/2023    AST 18 01/12/2023    ALT 8 (L) 01/12/2023    CHOL 165 11/22/2021    TRIG 138 11/22/2021    HDL 43 11/22/2021    LDLCALC 94.4 11/22/2021    HGBA1C 4.5 12/13/2022    TSH 0.585 10/27/2022       Assessment:       1. Hospital discharge follow-up    2. Acute renal failure on dialysis    3. Microscopic polyangiitis    4. Acute cystitis without hematuria    5. Chronic diastolic heart failure        Plan:   Kristin was seen today for follow-up.    Diagnoses and all orders for this visit:    Hospital discharge follow-up    Long and complicated history as per HPI. Pt on HD. Is to f-up with Rheumatologist in 2 weeks. Concern re: medications as sister states that only CCB to be taken and not the list as per discharge? Asked Nephrologist if he has infos on this. Advised pt's sister (pt lives with her for now) to double check medications at their place. Difficult and concerning situation.    Acute renal failure on dialysis    See HPI. HD TIW.    Microscopic polyangiitis    See notes from admissions.    Acute cystitis without hematuria    See HPI, treated.    Chronic diastolic heart failure    To be reassessed.    HIGH RISK SITUATION!    No follow-ups on file.    This note was created by combination of typed  and M-Modal dictation.  Transcription errors may be present.  If there are any questions, please contact me.

## 2023-02-17 DIAGNOSIS — M94.261 CHONDROMALACIA OF RIGHT KNEE: ICD-10-CM

## 2023-02-17 DIAGNOSIS — E03.9 ACQUIRED HYPOTHYROIDISM: ICD-10-CM

## 2023-02-17 DIAGNOSIS — S83.241D ACUTE MEDIAL MENISCUS TEAR OF RIGHT KNEE, SUBSEQUENT ENCOUNTER: ICD-10-CM

## 2023-02-17 NOTE — TELEPHONE ENCOUNTER
Due to patient being non-English speaking/uses sign language, an  was used for this visit. Only for face-to-face interpretation by an external agency, date and length of interpretation can be found on the scanned worksheet.     name: EZIO   Agency: Kathy Carrasquillo  Language: Uruguayan   Telephone number:   Type of interpretation: Telephone, spoken       No new care gaps identified.  [facility] Embedded Care Gaps. Reference number: [id number]. 2/17/2023   4:12:31 PM CST

## 2023-02-17 NOTE — TELEPHONE ENCOUNTER
Last Office Visit Info:   The patient's last visit with Lori Hernandez MD was on 2/10/2023.    The patient's last visit in current department was on 2/10/2023.        Last CBC Results:   Lab Results   Component Value Date    WBC 13.03 (H) 02/02/2023    HGB 7.2 (L) 02/02/2023    HCT 23.0 (L) 02/02/2023     (H) 02/02/2023       Last CMP Results  Lab Results   Component Value Date     02/02/2023    K 3.5 02/02/2023     02/02/2023    CO2 28 02/02/2023    BUN 64 (H) 02/02/2023    CREATININE 6.1 (H) 02/02/2023    CALCIUM 8.8 02/02/2023    ALBUMIN 2.4 (L) 02/02/2023    AST 18 01/12/2023    ALT 8 (L) 01/12/2023       Last Lipids  Lab Results   Component Value Date    CHOL 165 11/22/2021    TRIG 138 11/22/2021    HDL 43 11/22/2021    LDLCALC 94.4 11/22/2021       Last A1C  Lab Results   Component Value Date    HGBA1C 4.5 12/13/2022       Last TSH  Lab Results   Component Value Date    TSH 0.585 10/27/2022             Current Med Refills  Medication List with Changes/Refills   Current Medications    ACETAMINOPHEN (TYLENOL ARTHRITIS PAIN ORAL)    Take 1 tablet by mouth once daily.        Start Date: 8/6/2018  End Date: --    ALBUTEROL (VENTOLIN HFA) 90 MCG/ACTUATION INHALER    Inhale 2 puffs into the lungs every 6 (six) hours as needed for Shortness of Breath. Rescue       Start Date: 9/24/2022 End Date: --    AMLODIPINE (NORVASC) 10 MG TABLET    Take 1 tablet (10 mg total) by mouth once daily.       Start Date: 11/8/2021 End Date: --    APIXABAN (ELIQUIS) 5 MG TAB    Take 1 tablet (5 mg total) by mouth 2 (two) times daily.       Start Date: 1/31/2023 End Date: --    ATOVAQUONE (MEPRON) 750 MG/5 ML SUSP ORAL LIQUID    Take 10 mLs (1,500 mg total) by mouth once daily.       Start Date: 1/31/2023 End Date: --    CARVEDILOL (COREG) 12.5 MG TABLET    Take 1 tablet (12.5 mg total) by mouth 2 (two) times daily.       Start Date: 1/31/2023 End Date: 1/31/2024    EPINASTINE 0.05 % OPHTHALMIC SOLUTION    Place 1  drop into both eyes once daily.        Start Date: 6/2/2016  End Date: --    FISH,BORA,FLAX OILS-OM3,6,9NO1 (OMEGA 3-6-9) 1,200 MG CAP    Take 1 each by mouth once daily.       Start Date: 4/28/2019 End Date: --    FLUTICASONE PROPIONATE (FLONASE) 50 MCG/ACTUATION NASAL SPRAY    1 spray (50 mcg total) by Each Nostril route 2 (two) times daily.       Start Date: 2/7/2022  End Date: --    FLUZONE HIGHDOSE QUAD 20-21  MCG/0.7 ML SYRG    ADM 0.7ML IM UTD       Start Date: 10/23/2020End Date: --    FUROSEMIDE (LASIX) 40 MG TABLET    Take 1 tablet (40 mg total) by mouth once daily.       Start Date: 2/1/2023  End Date: 2/1/2024    HYDRALAZINE (APRESOLINE) 50 MG TABLET    Take 1 tablet (50 mg total) by mouth every 8 (eight) hours.       Start Date: 1/31/2023 End Date: 1/31/2024    LEVOCETIRIZINE (XYZAL) 5 MG TABLET    Take 1 tablet (5 mg total) by mouth every evening. For sinus       Start Date: 2/7/2022  End Date: 2/7/2023    LEVOTHYROXINE (EUTHYROX) 25 MCG TABLET    Take 1 tablet (25 mcg total) by mouth once daily.       Start Date: 1/31/2023 End Date: --    MELOXICAM (MOBIC) 15 MG TABLET    Take 1 tablet (15 mg total) by mouth daily as needed (arthritis).       Start Date: 4/6/2022  End Date: --    METHOCARBAMOL (ROBAXIN) 500 MG TAB    Take 1 tablet (500 mg total) by mouth 3 (three) times daily as needed (muscle spasms).       Start Date: 1/31/2023 End Date: --    MUPIROCIN (BACTROBAN) 2 % OINTMENT    Apply topically 2 (two) times daily.       Start Date: 4/8/2022  End Date: --    PREDNISONE (DELTASONE) 5 MG TABLET    Take 1 tablet (5 mg total) by mouth once daily.       Start Date: 1/31/2023 End Date: --    QUETIAPINE (SEROQUEL) 25 MG TAB    Take 1 tablet (25 mg total) by mouth every evening.       Start Date: 1/31/2023 End Date: 1/31/2024       Order(s) placed per written order guidelines: none    Please advise.

## 2023-02-17 NOTE — TELEPHONE ENCOUNTER
Refill Routing Note   Medication(s) are not appropriate for processing by [facility] for the following reason(s):         Medication outside of protocol  No active prescription written by PCP  Required labs abnormal    [facility] action(s):  Defer  Route         Appointments  past 12m or future 3m with PCP    Date Provider   Last Visit   2/10/2023 Lori Hernandez MD   Next Visit   Visit date not found Lori Hernandez MD   ED visits in past 90 days: 1        Note composed:4:19 PM 02/17/2023

## 2023-02-17 NOTE — TELEPHONE ENCOUNTER
----- Message from Hugo Viraj sent at 2/17/2023  3:49 PM CST -----  Regarding: Daughter [phone]  Type: RX Refill Request    Who Called: Daughter    Have you contacted your pharmacy: no     Refill    RX Name and Strength:  QUEtiapine (SEROQUEL) 25 MG Tab  predniSONE (DELTASONE) 5 MG tablet  methocarbamoL (ROBAXIN) 500 MG   levothyroxine (EUTHYROX) 25 MCG tablet  hydrALAZINE (APRESOLINE) 50 MG tablet  carvediloL (COREG) 12.5 MG tablet  atovaquone (MEPRON) 750 mg/5 mL Susp oral liquid  apixaban (ELIQUIS) 5 mg Tab  furosemide (LASIX) 40 MG tablet    Preferred Pharmacy with phone number:   [facility] [address] [place], [place]  [address]  [address] [place] [address]  Phone: [phone] Fax: [phone]     Local or Mail Order: local     Would the patient rather a call back or a response via [facility]? Call back     Best Call Back Number: [phone]    Additional Information: Called to try to get these refilled at the pharmacy listed in the message. Stated she got these medications after being in the ICU on the main campus from Dr. Paul Clayton. Stated she only got a 30 day supply.     Thank you.

## 2023-02-19 ENCOUNTER — PATIENT MESSAGE (OUTPATIENT)
Dept: FAMILY MEDICINE | Facility: CLINIC | Age: 78
End: 2023-02-19
Payer: MEDICAID

## 2023-02-19 DIAGNOSIS — I10 PRIMARY HYPERTENSION: Primary | ICD-10-CM

## 2023-02-20 RX ORDER — FUROSEMIDE 40 MG/1
40 TABLET ORAL DAILY
Qty: 30 TABLET | Refills: 0 | Status: SHIPPED | OUTPATIENT
Start: 2023-02-20 | End: 2023-03-21 | Stop reason: SDUPTHER

## 2023-02-20 RX ORDER — HYDRALAZINE HYDROCHLORIDE 50 MG/1
50 TABLET, FILM COATED ORAL EVERY 8 HOURS
Qty: 90 TABLET | Refills: 0 | Status: SHIPPED | OUTPATIENT
Start: 2023-02-20 | End: 2023-03-21 | Stop reason: SDUPTHER

## 2023-02-20 RX ORDER — ATOVAQUONE 750 MG/5ML
1500 SUSPENSION ORAL DAILY
Qty: 300 ML | Refills: 0 | Status: SHIPPED | OUTPATIENT
Start: 2023-02-20 | End: 2023-03-21 | Stop reason: SDUPTHER

## 2023-02-20 RX ORDER — CARVEDILOL 12.5 MG/1
12.5 TABLET ORAL 2 TIMES DAILY
Qty: 180 TABLET | Refills: 2 | Status: SHIPPED | OUTPATIENT
Start: 2023-02-20 | End: 2023-03-21 | Stop reason: SDUPTHER

## 2023-02-20 RX ORDER — PREDNISONE 5 MG/1
5 TABLET ORAL DAILY
Qty: 30 TABLET | Refills: 0 | Status: SHIPPED | OUTPATIENT
Start: 2023-02-20 | End: 2023-04-17

## 2023-02-20 RX ORDER — METHOCARBAMOL 500 MG/1
500 TABLET, FILM COATED ORAL 3 TIMES DAILY PRN
Qty: 30 TABLET | Refills: 0 | Status: SHIPPED | OUTPATIENT
Start: 2023-02-20 | End: 2023-03-21 | Stop reason: SDUPTHER

## 2023-02-20 RX ORDER — QUETIAPINE FUMARATE 25 MG/1
25 TABLET, FILM COATED ORAL NIGHTLY
Qty: 30 TABLET | Refills: 0 | Status: SHIPPED | OUTPATIENT
Start: 2023-02-20 | End: 2023-03-21 | Stop reason: SDUPTHER

## 2023-02-20 RX ORDER — LEVOTHYROXINE SODIUM 25 UG/1
25 TABLET ORAL DAILY
Qty: 30 TABLET | Refills: 0 | Status: SHIPPED | OUTPATIENT
Start: 2023-02-20 | End: 2023-03-21 | Stop reason: SDUPTHER

## 2023-02-22 ENCOUNTER — HOSPITAL ENCOUNTER (EMERGENCY)
Facility: HOSPITAL | Age: 78
Discharge: HOME | End: 2023-02-22
Attending: EMERGENCY MEDICINE
Payer: MEDICARE

## 2023-02-22 VITALS
WEIGHT: 120 LBS | SYSTOLIC BLOOD PRESSURE: 124 MMHG | OXYGEN SATURATION: 100 % | HEIGHT: 61 IN | TEMPERATURE: 98 F | HEART RATE: 76 BPM | BODY MASS INDEX: 22.66 KG/M2 | DIASTOLIC BLOOD PRESSURE: 62 MMHG | RESPIRATION RATE: 18 BRPM

## 2023-02-22 DIAGNOSIS — D64.9 ANEMIA, UNSPECIFIED TYPE: Primary | ICD-10-CM

## 2023-02-22 DIAGNOSIS — M19.049 HAND ARTHRITIS: ICD-10-CM

## 2023-02-22 DIAGNOSIS — M77.8 CAPSULITIS OF LEFT SHOULDER: ICD-10-CM

## 2023-02-22 LAB
ABO + RH BLD: ABNORMAL
ANISOCYTOSIS BLD QL SMEAR: SLIGHT
BASOPHILS # BLD AUTO: 0.04 K/UL (ref 0–0.2)
BASOPHILS NFR BLD: 0.3 % (ref 0–1.9)
BLD GP AB SCN CELLS X3 SERPL QL: ABNORMAL
BLD GP AB SCN CELLS X3 SERPL QL: NORMAL
BLD PROD TYP BPU: NORMAL
BLOOD UNIT EXPIRATION DATE: NORMAL
BLOOD UNIT TYPE CODE: 7300
BLOOD UNIT TYPE: NORMAL
BUN SERPL-MCNC: 29 MG/DL (ref 6–30)
CHLORIDE SERPL-SCNC: 101 MMOL/L (ref 95–110)
CODING SYSTEM: NORMAL
CREAT SERPL-MCNC: 4.6 MG/DL (ref 0.5–1.4)
CROSSMATCH INTERPRETATION: NORMAL
DIFFERENTIAL METHOD: ABNORMAL
DISPENSE STATUS: NORMAL
EOSINOPHIL # BLD AUTO: 0 K/UL (ref 0–0.5)
EOSINOPHIL NFR BLD: 0 % (ref 0–8)
ERYTHROCYTE [DISTWIDTH] IN BLOOD BY AUTOMATED COUNT: 20.2 % (ref 11.5–14.5)
GLUCOSE SERPL-MCNC: 137 MG/DL (ref 70–110)
HCT VFR BLD AUTO: 23.6 % (ref 37–48.5)
HCT VFR BLD CALC: 24 %PCV (ref 36–54)
HGB BLD-MCNC: 6.7 G/DL (ref 12–16)
IMM GRANULOCYTES # BLD AUTO: 0.26 K/UL (ref 0–0.04)
IMM GRANULOCYTES NFR BLD AUTO: 2.2 % (ref 0–0.5)
LYMPHOCYTES # BLD AUTO: 2.6 K/UL (ref 1–4.8)
LYMPHOCYTES NFR BLD: 22.2 % (ref 18–48)
MCH RBC QN AUTO: 28.4 PG (ref 27–31)
MCHC RBC AUTO-ENTMCNC: 28.4 G/DL (ref 32–36)
MCV RBC AUTO: 100 FL (ref 82–98)
MONOCYTES # BLD AUTO: 0.8 K/UL (ref 0.3–1)
MONOCYTES NFR BLD: 6.8 % (ref 4–15)
NEUTROPHILS # BLD AUTO: 8.1 K/UL (ref 1.8–7.7)
NEUTROPHILS NFR BLD: 68.5 % (ref 38–73)
NRBC BLD-RTO: 2 /100 WBC
NUM UNITS TRANS PACKED RBC: NORMAL
PLATELET # BLD AUTO: 371 K/UL (ref 150–450)
PLATELET BLD QL SMEAR: ABNORMAL
PMV BLD AUTO: 9.7 FL (ref 9.2–12.9)
POC IONIZED CALCIUM: 1.04 MMOL/L (ref 1.06–1.42)
POC TCO2 (MEASURED): 26 MMOL/L (ref 23–29)
POLYCHROMASIA BLD QL SMEAR: ABNORMAL
POTASSIUM BLD-SCNC: 3.4 MMOL/L (ref 3.5–5.1)
RBC # BLD AUTO: 2.36 M/UL (ref 4–5.4)
SAMPLE: ABNORMAL
SCHISTOCYTES BLD QL SMEAR: ABNORMAL
SMUDGE CELLS BLD QL SMEAR: PRESENT
SODIUM BLD-SCNC: 137 MMOL/L (ref 136–145)
WBC # BLD AUTO: 11.85 K/UL (ref 3.9–12.7)

## 2023-02-22 PROCEDURE — 99284 EMERGENCY DEPT VISIT MOD MDM: CPT | Mod: ,,, | Performed by: EMERGENCY MEDICINE

## 2023-02-22 PROCEDURE — P9016 RBC LEUKOCYTES REDUCED: HCPCS | Performed by: EMERGENCY MEDICINE

## 2023-02-22 PROCEDURE — 86900 BLOOD TYPING SEROLOGIC ABO: CPT | Performed by: EMERGENCY MEDICINE

## 2023-02-22 PROCEDURE — 99285 EMERGENCY DEPT VISIT HI MDM: CPT | Mod: 25

## 2023-02-22 PROCEDURE — 86850 RBC ANTIBODY SCREEN: CPT | Performed by: EMERGENCY MEDICINE

## 2023-02-22 PROCEDURE — 86920 COMPATIBILITY TEST SPIN: CPT | Performed by: EMERGENCY MEDICINE

## 2023-02-22 PROCEDURE — 99284 PR EMERGENCY DEPT VISIT,LEVEL IV: ICD-10-PCS | Mod: ,,, | Performed by: EMERGENCY MEDICINE

## 2023-02-22 PROCEDURE — 80047 BASIC METABLC PNL IONIZED CA: CPT

## 2023-02-22 PROCEDURE — 36430 TRANSFUSION BLD/BLD COMPNT: CPT

## 2023-02-22 PROCEDURE — 85025 COMPLETE CBC W/AUTO DIFF WBC: CPT | Performed by: EMERGENCY MEDICINE

## 2023-02-22 RX ORDER — HYDROCODONE BITARTRATE AND ACETAMINOPHEN 500; 5 MG/1; MG/1
TABLET ORAL
Status: DISCONTINUED | OUTPATIENT
Start: 2023-02-22 | End: 2023-02-22 | Stop reason: HOSPADM

## 2023-02-22 RX ORDER — MELOXICAM 15 MG/1
15 TABLET ORAL DAILY PRN
Qty: 30 TABLET | Refills: 2 | OUTPATIENT
Start: 2023-02-22
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
## 2023-02-23 RX ORDER — AMLODIPINE BESYLATE 10 MG/1
10 TABLET ORAL DAILY
Qty: 60 TABLET | Refills: 0 | Status: SHIPPED | OUTPATIENT
Start: 2023-02-23 | End: 2023-03-21 | Stop reason: SDUPTHER

## 2023-03-06 ENCOUNTER — EXTERNAL HOME HEALTH (OUTPATIENT)
Dept: HOME HEALTH SERVICES | Facility: HOSPITAL | Age: 78
End: 2023-03-06
Payer: MEDICARE

## 2023-03-07 ENCOUNTER — TELEPHONE (OUTPATIENT)
Dept: FAMILY MEDICINE | Facility: CLINIC | Age: 78
End: 2023-03-07
Payer: MEDICARE

## 2023-03-07 DIAGNOSIS — M79.89 LEG SWELLING: ICD-10-CM

## 2023-03-07 DIAGNOSIS — I50.32 CHRONIC DIASTOLIC HEART FAILURE: Primary | ICD-10-CM

## 2023-03-07 NOTE — TELEPHONE ENCOUNTER
Received fax from [facility] requesting orders for quad cane for improved stability with gait be faxed to [phone]

## 2023-03-10 ENCOUNTER — TELEPHONE (OUTPATIENT)
Dept: INFECTIOUS DISEASES | Facility: CLINIC | Age: 78
End: 2023-03-10
Payer: MEDICARE

## 2023-03-10 ENCOUNTER — PATIENT MESSAGE (OUTPATIENT)
Dept: CARDIOLOGY | Facility: CLINIC | Age: 78
End: 2023-03-10
Payer: MEDICARE

## 2023-03-10 ENCOUNTER — DOCUMENT SCAN (OUTPATIENT)
Dept: HOME HEALTH SERVICES | Facility: HOSPITAL | Age: 78
End: 2023-03-10
Payer: MEDICARE

## 2023-03-10 NOTE — TELEPHONE ENCOUNTER
Picc revomed per discharging DR:    Pt being discharged home per MD orders.  VSS, pt afebrile and no c/o pain at this time.  Reviewed discharge instructions, follow-up's and meds with pt. PIV removed, gauze/tape placed to site.  All personal belongings packed and with pt at bedside.  Ride to be provided by pt's daughter. w/c transport upon discharge.

## 2023-03-16 ENCOUNTER — TELEPHONE (OUTPATIENT)
Dept: FAMILY MEDICINE | Facility: CLINIC | Age: 78
End: 2023-03-16
Payer: MEDICARE

## 2023-03-16 NOTE — TELEPHONE ENCOUNTER
----- Message from Franca Nicholas sent at 3/16/2023  1:45 PM CDT -----  Type: Patient Call Back    Who called [facility]     What is the request in detail: asking for a call back     Can the clinic reply by [facility]?    Would the patient rather a call back or a response via [facility]?     Best call back number [phone]    Additional Information:

## 2023-03-18 ENCOUNTER — DOCUMENT SCAN (OUTPATIENT)
Dept: HOME HEALTH SERVICES | Facility: HOSPITAL | Age: 78
End: 2023-03-18
Payer: MEDICARE

## 2023-03-20 ENCOUNTER — OFFICE VISIT (OUTPATIENT)
Dept: PODIATRY | Facility: CLINIC | Age: 78
End: 2023-03-20
Payer: MEDICARE

## 2023-03-20 VITALS
BODY MASS INDEX: 22.64 KG/M2 | DIASTOLIC BLOOD PRESSURE: 62 MMHG | HEIGHT: 61 IN | HEART RATE: 74 BPM | WEIGHT: 119.94 LBS | SYSTOLIC BLOOD PRESSURE: 112 MMHG

## 2023-03-20 DIAGNOSIS — L60.0 INGROWN NAIL: ICD-10-CM

## 2023-03-20 DIAGNOSIS — B35.1 ONYCHOMYCOSIS DUE TO DERMATOPHYTE: Primary | ICD-10-CM

## 2023-03-20 DIAGNOSIS — M79.674 TOE PAIN, BILATERAL: ICD-10-CM

## 2023-03-20 DIAGNOSIS — M79.675 TOE PAIN, BILATERAL: ICD-10-CM

## 2023-03-20 PROCEDURE — 1125F PR PAIN SEVERITY QUANTIFIED, PAIN PRESENT: ICD-10-PCS | Mod: CPTII,S$GLB,, | Performed by: PODIATRIST

## 2023-03-20 PROCEDURE — 1160F RVW MEDS BY RX/DR IN RCRD: CPT | Mod: CPTII,S$GLB,, | Performed by: PODIATRIST

## 2023-03-20 PROCEDURE — 1159F MED LIST DOCD IN RCRD: CPT | Mod: CPTII,S$GLB,, | Performed by: PODIATRIST

## 2023-03-20 PROCEDURE — 1159F PR MEDICATION LIST DOCUMENTED IN MEDICAL RECORD: ICD-10-PCS | Mod: CPTII,S$GLB,, | Performed by: PODIATRIST

## 2023-03-20 PROCEDURE — 3078F DIAST BP <80 MM HG: CPT | Mod: CPTII,S$GLB,, | Performed by: PODIATRIST

## 2023-03-20 PROCEDURE — 99999 PR PBB SHADOW E&M-EST. PATIENT-LVL IV: ICD-10-PCS | Mod: PBBFAC,,, | Performed by: PODIATRIST

## 2023-03-20 PROCEDURE — 99203 OFFICE O/P NEW LOW 30 MIN: CPT | Mod: S$GLB,,, | Performed by: PODIATRIST

## 2023-03-20 PROCEDURE — 99203 PR OFFICE/OUTPT VISIT, NEW, LEVL III, 30-44 MIN: ICD-10-PCS | Mod: S$GLB,,, | Performed by: PODIATRIST

## 2023-03-20 PROCEDURE — 1160F PR REVIEW ALL MEDS BY PRESCRIBER/CLIN PHARMACIST DOCUMENTED: ICD-10-PCS | Mod: CPTII,S$GLB,, | Performed by: PODIATRIST

## 2023-03-20 PROCEDURE — 3078F PR MOST RECENT DIASTOLIC BLOOD PRESSURE < 80 MM HG: ICD-10-PCS | Mod: CPTII,S$GLB,, | Performed by: PODIATRIST

## 2023-03-20 PROCEDURE — 99999 PR PBB SHADOW E&M-EST. PATIENT-LVL IV: CPT | Mod: PBBFAC,,, | Performed by: PODIATRIST

## 2023-03-20 PROCEDURE — 3074F PR MOST RECENT SYSTOLIC BLOOD PRESSURE < 130 MM HG: ICD-10-PCS | Mod: CPTII,S$GLB,, | Performed by: PODIATRIST

## 2023-03-20 PROCEDURE — 1125F AMNT PAIN NOTED PAIN PRSNT: CPT | Mod: CPTII,S$GLB,, | Performed by: PODIATRIST

## 2023-03-20 PROCEDURE — 3074F SYST BP LT 130 MM HG: CPT | Mod: CPTII,S$GLB,, | Performed by: PODIATRIST

## 2023-03-20 RX ORDER — TORSEMIDE 100 MG/1
50 TABLET ORAL
COMMUNITY
Start: 2023-02-14 | End: 2023-05-19

## 2023-03-20 RX ORDER — PREDNISONE 10 MG/1
10 TABLET ORAL
COMMUNITY
Start: 2023-03-12 | End: 2023-07-17 | Stop reason: SDUPTHER

## 2023-03-20 NOTE — PATIENT INSTRUCTIONS
Athletes Foot     Athletes Foot is caused by a fungal infection in the skin. It affects the skin between the toes where it causes fissures (cracks in the skin). It can also affect the bottom of the foot where it causes dry white scales and peeling of the skin. This infection is more likely to occur when the foot is in hot, sweaty socks and shoes for long periods of time.   This infection is treated with skin creams or oral medicine.     Home Care:   It is important to keep the feet dry. Use absorbent cotton socks and change them if they become sweaty; or wear an open-toe shoe or sandal. Wash the feet at least once a day with Nizoral shampoo or dial antibacterial soap and water then dry completely. Avoid soaking and prolonged exposure to water  Rotate your shoes. If you must wear the same shoes everyday then spray the shoes with lysol or antifungal spray and allow that to dry overnight before wearing the shoes again  Apply the antifungal cream as prescribed. Some antifungal creams are available without a prescription (Lotrimin, Tinactin).   It may take 2 weeks before the rash starts to improve and it can take about three to ten weeks to completely clear. Continue the medicine until the rash is all gone.   Use over-the-counter antifungal powders or sprays on your feet after exposure to high-risk environments (public showers, gyms and locker rooms) may prevent future infections. You may wish to use appropriate footwear to reduce exposure.  Clean tubs and bathroom floor with bleach    Follow Up   with your doctor as recommended by our staff if the rash is not starting to improve after TEN days of treatment, or if the rash continues to spread.     Get Prompt Medical Attention   if any of the following occur:   Increasing redness or swelling of the foot   Pus draining from cracks in the skin   Fever of 100.4ºF (38ºC) or higher, or as directed by your healthcare provider    © 5151-4519 Ricardo Lay, [address] [place] [address]  [place] [address]. All rights reserved. This information is not intended as a substitute for professional medical care. Always follow your healthcare professional's instructions.               Understanding Thickened Nails    There are several causes of very thick or crumbling nails. They can be caused by injuries or pressure from shoes. Fungal infections are a common cause. Diabetes, psoriasis, or vascular disease are other possible causes.  Symptoms  Along with thickening, the nail may appear ridged, brittle, or yellowish. It may also feel painful when pressure is put on it. After a while, a thickened nail may loosen and fall off.  Evaluation  Because thickened nails may be a symptom of a medical condition, your healthcare provider will look at your medical history. A test may be done to check for fungal infection. The thickness and color of the nail are examined carefully. This helps determine the cause.  Treatment  For infection: Oral or topical antifungal medicines may be used to treat infection. These can help prevent sores under the nail. They also keep the fungus from spreading to other nails.  For thick nails not caused by infection: Thinning the nail may be an option. This can be done by trimming, filing, or grinding.  For pain: If the nail causes pain, part or all of the nail can be removed with surgery. Never try to remove a nail by yourself.  Prevention  Many nail problems can be prevented by wearing the right shoes and trimming your nails properly. Keep your feet clean and dry to help avoid infection. If you have diabetes, talk with your healthcare provider before doing any foot self-care.  The right shoes: Get your feet measured. Your size may change as you age. This is due to ligaments that loosen with age and allow the bones in your foot to change position or spread. Wear shoes that are supportive and roomy enough for your toes to wiggle. Look for shoes made of natural materials, such as  leather, which allow your feet to breathe.  Proper trimming: To avoid problems, trim your toenails straight across. Then file the edges with a nail file. If you cant trim your own nails, ask your healthcare provider to do so for you.        Wearing Proper Shoes                    You walk on your feet every day, forcing them to support the weight of your body. Repeated stress on your feet can cause damage over time. The right shoes can help protect your feet. The wrong shoes can cause more foot problems. Read the information below to help you find a shoe that fits your foot needs.     A good shoe fit will cover your foot outline.       A shoe that doesnt cover the outline is a bad fit.      Whats Your Foot Shape?  To get a good fit, you need to know the shape of your foot. Do this simple test: While standing, place your foot on a piece of paper and trace around it. Is your foot straight or curved? Do you have a foot problem, such as a bunion, that causes your foot outline to show a bulge on the side of your big toe?  Finding Your Fit  Bring your foot outline to the shore store to help you find the right shoe. Place a shoe you like on top of the outline to see if it matches the shape. The shoe should cover the outline. (If you have a bunion, the shoe may not cover the bulge on the outline. Look for soft leather shoes to stretch over the bunion.) Once youve found a pair of proper shoes, put them on. Walk around. Be sure the shoes dont rub or pinch. If the shoes feel good, youve found your fit!  The Right Shoe for You  A good shoe has features that provide comfort and support. It must also be the right size and shape for your feet. Look for a shoe made of breathable fabric and lining, such as leather or canvas. Be sure that shoes have enough tread to prevent slipping. Go to a good shoe store for help finding the right shoe.  Good Shoe Features  An ideal shoe has the following:  Laces for support. If tying laces  is a problem for you, try shoes with Velcro fasteners or oscar.  A front of the shoe (toe box) with ½ inch space in front of your longest toes.  An arch shape that supports your foot.  No more than 1½ inches of heel.  A stiff, snug back of the shoe to keep your foot from sliding around.  A smooth lining with no rough seams.  Shoe Shopping Tips  Below are some dos and donts for when you go to the shoe store.  Do:  Select the shoes that feel right. Wear them around the house. Then bring them to your foot doctor to check for fit. If they dont fit well, return them.  Shop late in the day, when your feet will be slightly bigger.  Each time you buy shoes, have both your feet measured while you are standing. Foot size changes with time.  Pick shoes to suit their purpose. High heels are okay for an occasional night on the town. But for everyday wear, choose a more sensible shoe.  Try on shoes while wearing any inserts specially made for your feet (orthoses).  Try on both the right and left shoes. If your feet are different sizes, pick a pair that fits the larger foot.  Dont:  Dont buy shoes based on shoe size alone. Always try on shoes, as sizes differ from brand to brand and within brands.  Dont expect shoes to break in. If they dont fit at the store, dont buy them.  Dont buy a shoe that doesnt match your foot shape.  What About Socks?  Always wear socks with shoes. Socks help absorb sweat and reduce friction and blistering. When shopping for shoes, choose soft, padded socks with seams that dont irritate your feet.  If You Have Foot Problems  Some foot problems cause deformities. This can make it hard to find a good fit. Look for shoes made of soft leather to stretch over the deformity. If you have bunions, buy shoes with a wider toe box. To fit hammertoes, look for shoes with a tall toe box. If you have arch problems, you may need inserts. In some cases, youll need to have custom footwear or orthoses made  for your feet.  Suggested Footwear  Ask your healthcare provider what kind of footwear you need. He or she may recommend a certain brand or shoe store.  © 2691-3195 [employer]. [address] [place] [address], [place] [address]. All rights reserved. This information is not intended as a substitute for professional medical care. Always follow your healthcare professional's instructions.      Miscellaneous Foot info:    Supplements for inflammation: Arnica Tabs, bromelain with tumeric, alpha lipoic acid, garlic     Over the counter pain creams: Biofreeze, Bengay, tiger balm, two old goat, lidocaine gel,  Absorbine Veterinary Liniment Gel Topical Analgesic Sore Muscle and Joint Pain Relief    Recommend lotions: eucerin, eucerin for diabetics, aquaphor, A&D ointment, gold bond for diabetics, sween, Nas's Bees all purpose baby ointment,  urea 40 with aloe (found on amazon.com)    Shoe recommendations: (try 6pm.UniSmart, zapposAppLift , [employer], or shoes.UniSmart for discounted prices) you can visit [employer] in [place]  or [facility] [facility] in the [facility] (there are also several shoe brand outlets in the [facility])    Asics (GT 2000 or gel foundations), new balance stability type shoes (such as the 940 series), saucony (stabil c3),  Mahmood (GTS or Beast or transcend), propet, Vincent, Hoka One Bondi 7 (tennis shoe)    Sofft Brand, baretraps (women) Katiana&Stu (men), clarks, crocs, aerosoles, naturalizers, SAS, ecco, born, emily choi, rockports (dress shoes)    Vionic, burkenstocks, fitflops, propet, baretraps (sandals)    HokaOne slide sandals, crocs, propet (house shoes)    Nail Home remedy:  Vicks Vapor rub or Emuaid to nails for easier manageability    Occasional soaks with apple cider vinegar and olive oil      Toe Extension (Flexibility)    These instructions are for your right foot. Switch sides for your left foot.  Sit in a chair. Rest your right ankle on your left knee.  Hold your toes with your right  hand. Gently bend the toes backward. Feel a stretch in the undersides of the toes and ball of the foot. Hold for 30 to 60 seconds.  Then gently bend the toes in the other direction. Gently press on them until your foot is pointed. Hold for 30 to 60 seconds.  Repeat 5 times, or as instructed.  © 4306-1219 [employer]. [address] [place] [address]. All rights reserved. This information is not intended as a substitute for professional medical care. Always follow your healthcare professional's instructions.

## 2023-03-21 NOTE — PROGRESS NOTES
Subjective:      Patient ID: Kristin Goodman is a 77 y.o. female.    Chief Complaint: Nail Problem (Bilateral big toes)    Kristin Goodman is a 77 y.o. female who presents to the clinic complaining of painful ingrown toenail on both feet. Patient has not attempted self treatment.  This is an initial problem. Began following hospital discharge.Patient denies history of trauma. Patient  denies purulent drainage.    Current shoe gear:  Flat shoes    Patient Active Problem List   Diagnosis    Hypothyroid    Primary hypertension    Trochanteric bursitis of right hip    Mitral valve regurgitation    Chest pain    Syncope    Inflammatory spondylopathy    CAREY (dyspnea on exertion)    Chronic diastolic heart failure    Colon cancer screening    DONAVAN (obstructive sleep apnea)    Sleep arousal disorder    Acute medial meniscal tear, right, initial encounter    Decreased range of motion (ROM) of right knee    Quadriceps weakness    Weakness of right hip    Impaired mobility    Acute medial meniscal injury of right knee    S/P meniscectomy    Atherosclerosis of renal artery    Pain in both wrists    Decreased range of motion of both wrists    Decreased  strength    Alteration in instrumental activities of daily living (IADL)    Other specified anemias    Lymphadenopathy of head and neck    Microscopic polyangiitis    Acute renal failure on dialysis    Moderate malnutrition    Dysphagia    High risk medications (not anticoagulants) long-term use    Gastric reflux    Primary pauci-immune necrotizing and crescentic glomerulonephritis    Hematuria syndrome    Dependence on hemodialysis    Anemia due to chronic kidney disease    Dizzy spells    Urinary tract infection due to extended-spectrum beta lactamase (ESBL) producing Escherichia coli    Bacteremia due to Enterococcus    Pauci-immune vasculitis    Acute deep vein thrombosis (DVT) of non-extremity vein - subclavian    WILFREDO (acute kidney  injury)       Current Outpatient Medications on File Prior to Visit   Medication Sig Dispense Refill    ACETAMINOPHEN (TYLENOL ARTHRITIS PAIN ORAL) Take 1 tablet by mouth once daily.       epinastine 0.05 % ophthalmic solution Place 1 drop into both eyes once daily.       FLUZONE HIGHDOSE QUAD 20-21  mcg/0.7 mL Syrg ADM 0.7ML IM UTD      mupirocin (BACTROBAN) 2 % ointment Apply topically 2 (two) times daily.      predniSONE (DELTASONE) 10 MG tablet Take 10 mg by mouth.      predniSONE (DELTASONE) 5 MG tablet Take 1 tablet (5 mg total) by mouth once daily. 30 tablet 0    torsemide (DEMADEX) 100 MG Tab Take 50 mg by mouth.      albuterol (VENTOLIN HFA) 90 mcg/actuation inhaler Inhale 2 puffs into the lungs every 6 (six) hours as needed for Shortness of Breath. Rescue (Patient not taking: Reported on 2/10/2023) 18 g 0    fish,bora,flax oils-om3,6,9no1 (OMEGA 3-6-9) 1,200 mg Cap Take 1 each by mouth once daily. (Patient not taking: Reported on 2/10/2023) 30 capsule 3    meloxicam (MOBIC) 15 MG tablet Take 1 tablet (15 mg total) by mouth daily as needed (arthritis). (Patient not taking: Reported on 2/10/2023) 30 tablet 2     Current Facility-Administered Medications on File Prior to Visit   Medication Dose Route Frequency Provider Last Rate Last Admin    celecoxib capsule 400 mg  400 mg Oral Once Dieudonne Marshall MD        fentaNYL 50 mcg/mL injection  mcg   mcg Intravenous PRN Dieudonne Marshall MD   100 mcg at 12/21/21 0919    LIDOcaine (PF) 10 mg/ml (1%) injection 10 mg  1 mL Intradermal Once PRN Dieudonne Marshall MD        LIDOcaine (PF) 10 mg/ml (1%) injection 10 mg  1 mL Intradermal Once Dieudonne Marshall MD        midazolam (VERSED) 1 mg/mL injection 0.5-4 mg  0.5-4 mg Intravenous PRN Dieudonne Marshall MD   2 mg at 12/21/21 0919    ropivacaine 0.2% [facility] PainPRO Pump infusion 500 ML   Perineural Continuous Dieudonne Marshall MD   New Bag at 12/21/21 1151        Review of patient's allergies indicates:   Allergen Reactions    Ampicillin     Peaches [peach (prunus persica)] Other (See Comments)     Pt unable to state type of reaction. Information obtained from daughter who states she was informed of allergy from patient.    Penicillins      Other reaction(s): Hives, anaphylaxis    Sulfa (sulfonamide antibiotics) Rash and Hives       Past Surgical History:   Procedure Laterality Date    ARTHROSCOPIC CHONDROPLASTY OF KNEE JOINT Right 12/21/2021    Procedure: ARTHROSCOPY, KNEE, WITH CHONDROPLASTY;  Surgeon: Elly Sullivan MD;  Location: [facility] OR;  Service: Orthopedics;  Laterality: Right;    COLONOSCOPY N/A 9/28/2020    Procedure: COLONOSCOPY;  Surgeon: Jaylan Flynn MD;  Location: [facility] ENDO;  Service: Endoscopy;  Laterality: N/A;    ESOPHAGOGASTRODUODENOSCOPY N/A 11/14/2022    Procedure: EGD (ESOPHAGOGASTRODUODENOSCOPY);  Surgeon: Asaf Hahn MD;  Location: [facility] ([address] [place] [address]);  Service: Endoscopy;  Laterality: N/A;    KNEE ARTHROSCOPY W/ MENISCECTOMY Right 12/21/2021    Procedure: ARTHROSCOPY, KNEE, WITH MENISCECTOMY;  Surgeon: Elly Sullivan MD;  Location: [facility] OR;  Service: Orthopedics;  Laterality: Right;  general, regional w catheter, adductor, josefina 50cc,     OOPHORECTOMY      SYNOVECTOMY OF KNEE Right 12/21/2021    Procedure: SYNOVECTOMY, KNEE;  Surgeon: Elly Sullivan MD;  Location: [facility] OR;  Service: Orthopedics;  Laterality: Right;    TOTAL ABDOMINAL HYSTERECTOMY      19 yrs ago       Family History   Problem Relation Age of Onset    Arthritis Mother     Early death Mother 56    Hypertension Mother     Diabetes Father     Early death Father 62    Hypertension Father     Stroke Father     Arthritis Sister     Cancer Sister         cervical    Early death Sister 63    Heart disease Sister         anyuresem    Hypertension Sister     Hyperlipidemia Sister     Alzheimer's disease Sister     Rheum arthritis Sister     Arthritis  "Brother     Cancer Brother         lung cancer    Early death Brother 59    Heart disease Brother         heart attack    Hypertension Brother     Vision loss Brother     Prostate cancer Brother     Hypertension Daughter     Breast cancer Other        Social History     Socioeconomic History    Marital status:    Tobacco Use    Smoking status: Former     Packs/day: 0.50     Years: 6.00     Pack years: 3.00     Types: Cigarettes    Smokeless tobacco: Never    Tobacco comments:     Quit ~ 30 years ago   Substance and Sexual Activity    Alcohol use: No    Drug use: No    Sexual activity: Never     Partners: Male       Review of Systems   Constitutional: Negative for chills and fever.   Cardiovascular:  Negative for claudication and leg swelling.   Respiratory:  Negative for cough and shortness of breath.    Skin:  Positive for dry skin and nail changes. Negative for itching and rash.   Musculoskeletal:  Positive for arthritis, joint pain, muscle weakness, myalgias and stiffness. Negative for falls and joint swelling.   Gastrointestinal:  Negative for diarrhea, nausea and vomiting.   Neurological:  Negative for numbness, paresthesias, tremors and weakness.   Psychiatric/Behavioral:  Negative for altered mental status and hallucinations.          Objective:      Vitals:    03/20/23 1056   BP: 112/62   Pulse: 74   Weight: 54.4 kg (119 lb 14.9 oz)   Height: 5' 1" (1.549 m)   PainSc:   8   PainLoc: Foot       Physical Exam  Vitals and nursing note reviewed.   Constitutional:       General: She is not in acute distress.     Appearance: She is well-developed. She is not toxic-appearing or diaphoretic.      Comments: alert and oriented x 3.    Cardiovascular:      Pulses:           Dorsalis pedis pulses are 2+ on the right side and 2+ on the left side.        Posterior tibial pulses are 2+ on the right side and 2+ on the left side.      Comments:  Capillary refill time is within normal limits. Digital " hair present.   Pulmonary:      Effort: No respiratory distress.   Musculoskeletal:         General: No deformity.      Right ankle: No swelling. No tenderness. No lateral malleolus, medial malleolus, AITF ligament, CF ligament or posterior TF ligament tenderness. Normal range of motion.      Right Achilles Tendon: No defects. Lo's test negative.      Left ankle: No swelling. No tenderness. No lateral malleolus, medial malleolus, AITF ligament, CF ligament or posterior TF ligament tenderness. Normal range of motion.      Left Achilles Tendon: No defects. Lo's test negative.      Right foot: No tenderness or bony tenderness.      Left foot: No tenderness or bony tenderness.      Comments: Fat pad atrophy to heels and met heads bilateral    There is equinus deformity bilateral with decreased dorsiflexion at the ankle joint bilateral.     Decreased first MPJ range of motion both weightbearing and nonweightbearing, + crepitus observed the first MP joint, + dorsal flag sign. Moderate bunion deformity is observed .    Patient has hammertoes of digits 2-5 bilateral partially reducible    Feet:      Right foot:      Protective Sensation: 5 sites tested.  5 sites sensed.      Skin integrity: Skin integrity normal.      Left foot:      Protective Sensation: 5 sites tested.  5 sites sensed.      Skin integrity: Skin integrity normal.   Lymphadenopathy:      Comments: No lymphatic streaking     Skin:     General: Skin is warm and dry.      Coloration: Skin is not pale.      Findings: No bruising, burn, laceration, lesion or rash.      Nails: There is no clubbing.      Comments: Tenderness to bilateral hallux nail margin of each foot with ingrown nail plate and HPK buildup. Surrounding erythema and minimal edema is noted there is no granuloma formation noted. no malodor     Toenails 1-5 bilaterally are thickened by 2-3 mm, discolored/yellowed, dystrophic, brittle with subungual debris. Tender to distal nail plate  pressure, without periungual skin abnormality of each.        Neurological:      Sensory: No sensory deficit.      Motor: No tremor, atrophy or abnormal muscle tone.      Deep Tendon Reflexes: Reflexes are normal and symmetric.      Comments: Light touch present     Psychiatric:         Attention and Perception: She is attentive.         Mood and Affect: Mood is not anxious. Affect is not inappropriate.         Speech: She is communicative. Speech is not slurred.         Behavior: Behavior is not combative.             Assessment:       Encounter Diagnoses   Name Primary?    Onychomycosis due to dermatophyte Yes    Ingrown nail     Toe pain, bilateral          Plan:       Kristin was seen today for nail problem.    Diagnoses and all orders for this visit:    Onychomycosis due to dermatophyte    Ingrown nail    Toe pain, bilateral      I counseled the patient on her conditions, their implications and medical management.    In depth conversation on the treatment of ingrown nail; partial nail avulsion vs chemical matrixectomy vs conservative treatment of soaking and nail trimming    Informed patient that many nail problems can be prevented by wearing the right shoes and trimming your nails properly.   The right shoes: Patient is to wear shoes that are supportive and roomy enough for toes to wiggle. Look for shoes made of natural materials such as leather, which allow  feet to breathe.   Proper trimming: To avoid problems, she was instructed to trim toenails straight across without cutting down into the corners.      Discussed all treatment options such as topical, oral, laser treatments vs surgical removal of nail permanent vs non-permanent in detail pertaining to nail fungus. Instructed patient on the importance of keeping fungus off of skin while treating nails. Patient instructed to use absorbent cotton socks and change them if they become sweaty; or wear an open-toe shoe or sandal. Wash the feet at least once a  day with soap and water. Patient wants to try OTC topicals. Instructed patient that it takes time for symptoms to completely dissipate. Patient instructed to use lysol or over-the-counter antifungal powders or sprays to shoes daily and allow them to air dry, switching shoes from every other day would be optimal. Patient is to avoid barefoot walking in  high-risk environments (public showers, gyms and locker rooms) may prevent future infections.     RTC PRN or if patient wishes to pursue ingrown nail procedure        Procedures

## 2023-03-23 ENCOUNTER — DOCUMENT SCAN (OUTPATIENT)
Dept: HOME HEALTH SERVICES | Facility: HOSPITAL | Age: 78
End: 2023-03-23
Payer: MEDICARE

## 2023-04-05 ENCOUNTER — DOCUMENT SCAN (OUTPATIENT)
Dept: HOME HEALTH SERVICES | Facility: HOSPITAL | Age: 78
End: 2023-04-05
Payer: MEDICARE

## 2023-04-17 ENCOUNTER — PATIENT MESSAGE (OUTPATIENT)
Dept: PHARMACY | Facility: CLINIC | Age: 78
End: 2023-04-17
Payer: MEDICARE

## 2023-04-17 ENCOUNTER — OFFICE VISIT (OUTPATIENT)
Dept: RHEUMATOLOGY | Facility: CLINIC | Age: 78
End: 2023-04-17
Payer: MEDICARE

## 2023-04-17 ENCOUNTER — LAB VISIT (OUTPATIENT)
Dept: LAB | Facility: HOSPITAL | Age: 78
End: 2023-04-17
Attending: INTERNAL MEDICINE
Payer: MEDICARE

## 2023-04-17 VITALS
DIASTOLIC BLOOD PRESSURE: 66 MMHG | HEIGHT: 61 IN | SYSTOLIC BLOOD PRESSURE: 115 MMHG | BODY MASS INDEX: 22.89 KG/M2 | HEART RATE: 84 BPM | WEIGHT: 121.25 LBS

## 2023-04-17 DIAGNOSIS — N05.8 PRIMARY PAUCI-IMMUNE NECROTIZING AND CRESCENTIC GLOMERULONEPHRITIS: ICD-10-CM

## 2023-04-17 DIAGNOSIS — N05.7 PRIMARY PAUCI-IMMUNE NECROTIZING AND CRESCENTIC GLOMERULONEPHRITIS: ICD-10-CM

## 2023-04-17 DIAGNOSIS — I77.6 PAUCI-IMMUNE VASCULITIS: ICD-10-CM

## 2023-04-17 DIAGNOSIS — Z79.899 HIGH RISK MEDICATIONS (NOT ANTICOAGULANTS) LONG-TERM USE: ICD-10-CM

## 2023-04-17 DIAGNOSIS — D84.9 IMMUNOSUPPRESSION: ICD-10-CM

## 2023-04-17 DIAGNOSIS — M31.7 MICROSCOPIC POLYANGIITIS: Primary | ICD-10-CM

## 2023-04-17 PROBLEM — M62.81 QUADRICEPS WEAKNESS: Status: RESOLVED | Noted: 2021-10-06 | Resolved: 2023-04-17

## 2023-04-17 PROBLEM — N17.9 AKI (ACUTE KIDNEY INJURY): Status: RESOLVED | Noted: 2023-01-21 | Resolved: 2023-04-17

## 2023-04-17 PROBLEM — R59.1 LYMPHADENOPATHY OF HEAD AND NECK: Status: RESOLVED | Noted: 2022-10-21 | Resolved: 2023-04-17

## 2023-04-17 PROBLEM — M25.632 DECREASED RANGE OF MOTION OF BOTH WRISTS: Status: RESOLVED | Noted: 2022-09-12 | Resolved: 2023-04-17

## 2023-04-17 PROBLEM — R78.81 BACTEREMIA DUE TO ENTEROCOCCUS: Status: RESOLVED | Noted: 2023-01-09 | Resolved: 2023-04-17

## 2023-04-17 PROBLEM — B95.2 BACTEREMIA DUE TO ENTEROCOCCUS: Status: RESOLVED | Noted: 2023-01-09 | Resolved: 2023-04-17

## 2023-04-17 PROBLEM — Z12.11 COLON CANCER SCREENING: Status: RESOLVED | Noted: 2020-09-28 | Resolved: 2023-04-17

## 2023-04-17 PROBLEM — M70.61 TROCHANTERIC BURSITIS OF RIGHT HIP: Status: RESOLVED | Noted: 2018-10-17 | Resolved: 2023-04-17

## 2023-04-17 PROBLEM — M25.661 DECREASED RANGE OF MOTION (ROM) OF RIGHT KNEE: Status: RESOLVED | Noted: 2021-10-06 | Resolved: 2023-04-17

## 2023-04-17 PROBLEM — M46.90 INFLAMMATORY SPONDYLOPATHY: Status: RESOLVED | Noted: 2019-08-22 | Resolved: 2023-04-17

## 2023-04-17 PROBLEM — M25.532 PAIN IN BOTH WRISTS: Status: RESOLVED | Noted: 2022-09-08 | Resolved: 2023-04-17

## 2023-04-17 PROBLEM — R29.898 DECREASED GRIP STRENGTH: Status: RESOLVED | Noted: 2022-09-12 | Resolved: 2023-04-17

## 2023-04-17 PROBLEM — M25.531 PAIN IN BOTH WRISTS: Status: RESOLVED | Noted: 2022-09-08 | Resolved: 2023-04-17

## 2023-04-17 PROBLEM — M25.631 DECREASED RANGE OF MOTION OF BOTH WRISTS: Status: RESOLVED | Noted: 2022-09-12 | Resolved: 2023-04-17

## 2023-04-17 PROBLEM — S83.8X1A ACUTE MEDIAL MENISCAL INJURY OF RIGHT KNEE: Status: RESOLVED | Noted: 2021-11-15 | Resolved: 2023-04-17

## 2023-04-17 PROBLEM — R29.898 WEAKNESS OF RIGHT HIP: Status: RESOLVED | Noted: 2021-10-06 | Resolved: 2023-04-17

## 2023-04-17 LAB
ALBUMIN SERPL BCP-MCNC: 3.5 G/DL (ref 3.5–5.2)
ALP SERPL-CCNC: 67 U/L (ref 55–135)
ALT SERPL W/O P-5'-P-CCNC: 14 U/L (ref 10–44)
ANION GAP SERPL CALC-SCNC: 16 MMOL/L (ref 8–16)
AST SERPL-CCNC: 17 U/L (ref 10–40)
BASOPHILS # BLD AUTO: 0.07 K/UL (ref 0–0.2)
BASOPHILS NFR BLD: 0.4 % (ref 0–1.9)
BILIRUB SERPL-MCNC: 0.2 MG/DL (ref 0.1–1)
BUN SERPL-MCNC: 54 MG/DL (ref 8–23)
C3 SERPL-MCNC: 149 MG/DL (ref 50–180)
C4 SERPL-MCNC: 43 MG/DL (ref 11–44)
CALCIUM SERPL-MCNC: 9.8 MG/DL (ref 8.7–10.5)
CHLORIDE SERPL-SCNC: 99 MMOL/L (ref 95–110)
CO2 SERPL-SCNC: 26 MMOL/L (ref 23–29)
CREAT SERPL-MCNC: 5.1 MG/DL (ref 0.5–1.4)
CRP SERPL-MCNC: 4.4 MG/L (ref 0–8.2)
DIFFERENTIAL METHOD: ABNORMAL
EOSINOPHIL # BLD AUTO: 0 K/UL (ref 0–0.5)
EOSINOPHIL NFR BLD: 0.1 % (ref 0–8)
ERYTHROCYTE [DISTWIDTH] IN BLOOD BY AUTOMATED COUNT: 17.5 % (ref 11.5–14.5)
ERYTHROCYTE [SEDIMENTATION RATE] IN BLOOD BY PHOTOMETRIC METHOD: 55 MM/HR (ref 0–36)
EST. GFR  (NO RACE VARIABLE): 8.3 ML/MIN/1.73 M^2
GLUCOSE SERPL-MCNC: 97 MG/DL (ref 70–110)
HBV CORE AB SERPL QL IA: NORMAL
HBV SURFACE AG SERPL QL IA: NORMAL
HCT VFR BLD AUTO: 39.6 % (ref 37–48.5)
HGB BLD-MCNC: 12.4 G/DL (ref 12–16)
IMM GRANULOCYTES # BLD AUTO: 0.26 K/UL (ref 0–0.04)
IMM GRANULOCYTES NFR BLD AUTO: 1.6 % (ref 0–0.5)
LYMPHOCYTES # BLD AUTO: 2.2 K/UL (ref 1–4.8)
LYMPHOCYTES NFR BLD: 13.1 % (ref 18–48)
MCH RBC QN AUTO: 29 PG (ref 27–31)
MCHC RBC AUTO-ENTMCNC: 31.3 G/DL (ref 32–36)
MCV RBC AUTO: 93 FL (ref 82–98)
MONOCYTES # BLD AUTO: 1 K/UL (ref 0.3–1)
MONOCYTES NFR BLD: 5.9 % (ref 4–15)
NEUTROPHILS # BLD AUTO: 13.2 K/UL (ref 1.8–7.7)
NEUTROPHILS NFR BLD: 78.9 % (ref 38–73)
NRBC BLD-RTO: 0 /100 WBC
PLATELET # BLD AUTO: 369 K/UL (ref 150–450)
PMV BLD AUTO: 9.7 FL (ref 9.2–12.9)
POTASSIUM SERPL-SCNC: 4.2 MMOL/L (ref 3.5–5.1)
PROT SERPL-MCNC: 7.6 G/DL (ref 6–8.4)
RBC # BLD AUTO: 4.27 M/UL (ref 4–5.4)
SODIUM SERPL-SCNC: 141 MMOL/L (ref 136–145)
WBC # BLD AUTO: 16.7 K/UL (ref 3.9–12.7)

## 2023-04-17 PROCEDURE — 99999 PR PBB SHADOW E&M-EST. PATIENT-LVL III: CPT | Mod: PBBFAC,,, | Performed by: INTERNAL MEDICINE

## 2023-04-17 PROCEDURE — 1160F RVW MEDS BY RX/DR IN RCRD: CPT | Mod: CPTII,S$GLB,, | Performed by: INTERNAL MEDICINE

## 2023-04-17 PROCEDURE — 36415 COLL VENOUS BLD VENIPUNCTURE: CPT | Performed by: INTERNAL MEDICINE

## 2023-04-17 PROCEDURE — 86704 HEP B CORE ANTIBODY TOTAL: CPT | Performed by: INTERNAL MEDICINE

## 2023-04-17 PROCEDURE — 3078F PR MOST RECENT DIASTOLIC BLOOD PRESSURE < 80 MM HG: ICD-10-PCS | Mod: CPTII,S$GLB,, | Performed by: INTERNAL MEDICINE

## 2023-04-17 PROCEDURE — 1159F PR MEDICATION LIST DOCUMENTED IN MEDICAL RECORD: ICD-10-PCS | Mod: CPTII,S$GLB,, | Performed by: INTERNAL MEDICINE

## 2023-04-17 PROCEDURE — 86160 COMPLEMENT ANTIGEN: CPT | Performed by: INTERNAL MEDICINE

## 2023-04-17 PROCEDURE — 99214 PR OFFICE/OUTPT VISIT, EST, LEVL IV, 30-39 MIN: ICD-10-PCS | Mod: S$GLB,,, | Performed by: INTERNAL MEDICINE

## 2023-04-17 PROCEDURE — 86140 C-REACTIVE PROTEIN: CPT | Performed by: INTERNAL MEDICINE

## 2023-04-17 PROCEDURE — 99999 PR PBB SHADOW E&M-EST. PATIENT-LVL III: ICD-10-PCS | Mod: PBBFAC,,, | Performed by: INTERNAL MEDICINE

## 2023-04-17 PROCEDURE — 85025 COMPLETE CBC W/AUTO DIFF WBC: CPT | Performed by: INTERNAL MEDICINE

## 2023-04-17 PROCEDURE — 1126F AMNT PAIN NOTED NONE PRSNT: CPT | Mod: CPTII,S$GLB,, | Performed by: INTERNAL MEDICINE

## 2023-04-17 PROCEDURE — 3078F DIAST BP <80 MM HG: CPT | Mod: CPTII,S$GLB,, | Performed by: INTERNAL MEDICINE

## 2023-04-17 PROCEDURE — 3074F PR MOST RECENT SYSTOLIC BLOOD PRESSURE < 130 MM HG: ICD-10-PCS | Mod: CPTII,S$GLB,, | Performed by: INTERNAL MEDICINE

## 2023-04-17 PROCEDURE — 1159F MED LIST DOCD IN RCRD: CPT | Mod: CPTII,S$GLB,, | Performed by: INTERNAL MEDICINE

## 2023-04-17 PROCEDURE — 1126F PR PAIN SEVERITY QUANTIFIED, NO PAIN PRESENT: ICD-10-PCS | Mod: CPTII,S$GLB,, | Performed by: INTERNAL MEDICINE

## 2023-04-17 PROCEDURE — 80053 COMPREHEN METABOLIC PANEL: CPT | Performed by: INTERNAL MEDICINE

## 2023-04-17 PROCEDURE — 1160F PR REVIEW ALL MEDS BY PRESCRIBER/CLIN PHARMACIST DOCUMENTED: ICD-10-PCS | Mod: CPTII,S$GLB,, | Performed by: INTERNAL MEDICINE

## 2023-04-17 PROCEDURE — 99214 OFFICE O/P EST MOD 30 MIN: CPT | Mod: S$GLB,,, | Performed by: INTERNAL MEDICINE

## 2023-04-17 PROCEDURE — 86160 COMPLEMENT ANTIGEN: CPT | Mod: 59 | Performed by: INTERNAL MEDICINE

## 2023-04-17 PROCEDURE — 3074F SYST BP LT 130 MM HG: CPT | Mod: CPTII,S$GLB,, | Performed by: INTERNAL MEDICINE

## 2023-04-17 PROCEDURE — 86225 DNA ANTIBODY NATIVE: CPT | Performed by: INTERNAL MEDICINE

## 2023-04-17 PROCEDURE — 87340 HEPATITIS B SURFACE AG IA: CPT | Performed by: INTERNAL MEDICINE

## 2023-04-17 PROCEDURE — 85652 RBC SED RATE AUTOMATED: CPT | Performed by: INTERNAL MEDICINE

## 2023-04-17 RX ORDER — SODIUM CHLORIDE 0.9 % (FLUSH) 0.9 %
10 SYRINGE (ML) INJECTION
Status: CANCELLED | OUTPATIENT
Start: 2023-05-10

## 2023-04-17 RX ORDER — DIPHENHYDRAMINE HYDROCHLORIDE 50 MG/ML
50 INJECTION INTRAMUSCULAR; INTRAVENOUS ONCE AS NEEDED
Status: CANCELLED | OUTPATIENT
Start: 2023-05-10

## 2023-04-17 RX ORDER — ATOVAQUONE 750 MG/5ML
1500 SUSPENSION ORAL DAILY
Qty: 300 ML | Refills: 0 | Status: SHIPPED | OUTPATIENT
Start: 2023-04-17 | End: 2023-05-19 | Stop reason: SDUPTHER

## 2023-04-17 RX ORDER — FAMOTIDINE 10 MG/ML
20 INJECTION INTRAVENOUS
Status: CANCELLED | OUTPATIENT
Start: 2023-05-10

## 2023-04-17 RX ORDER — EPINEPHRINE 0.3 MG/.3ML
0.3 INJECTION SUBCUTANEOUS ONCE AS NEEDED
Status: CANCELLED | OUTPATIENT
Start: 2023-05-10

## 2023-04-17 RX ORDER — ACETAMINOPHEN 325 MG/1
650 TABLET ORAL
Status: CANCELLED | OUTPATIENT
Start: 2023-05-10

## 2023-04-17 RX ORDER — HEPARIN 100 UNIT/ML
500 SYRINGE INTRAVENOUS
Status: CANCELLED | OUTPATIENT
Start: 2023-05-10

## 2023-04-17 RX ORDER — MEPERIDINE HYDROCHLORIDE 50 MG/ML
25 INJECTION INTRAMUSCULAR; INTRAVENOUS; SUBCUTANEOUS
Status: CANCELLED | OUTPATIENT
Start: 2023-05-10 | End: 2023-05-11

## 2023-04-17 ASSESSMENT — ROUTINE ASSESSMENT OF PATIENT INDEX DATA (RAPID3)
FATIGUE SCORE: 0.5
TOTAL RAPID3 SCORE: 6.5
PSYCHOLOGICAL DISTRESS SCORE: 1.1
MDHAQ FUNCTION SCORE: 0.6
PATIENT GLOBAL ASSESSMENT SCORE: 9
PAIN SCORE: 8.5

## 2023-04-17 NOTE — ASSESSMENT & PLAN NOTE
S/p profound immunosuppression but no current  Infections apparent    Will start shingles vaccination    Will continue atovaquone prophylaxis

## 2023-04-17 NOTE — PROGRESS NOTES
"Subjective:       Patient ID: Kristin Goodman is a 78 y.o. female.    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   3)  CHANGES SUGGESTIVE OF FOCAL ACUTE PYELONEPHRITIS.   4)  MILD-TO-MODERATE ARTERIONEPHROSCLEROSIS     Currently on prednisone 10 mg/d  Still hemodialysis twice / week [place], Tuesday and Saturday      Review of Systems   Constitutional:  Negative for fever and unexpected weight change.   HENT:  Negative for mouth sores and trouble swallowing.         No Dry mouth   Eyes:  Negative for redness.        No Dry eyes   Respiratory:  Negative for cough and shortness of breath.    Cardiovascular:  Negative for chest pain.   Gastrointestinal:  Negative for abdominal pain, constipation and diarrhea.   Genitourinary:  Negative for dysuria and genital sores.   Skin:  Negative for rash.   Neurological:  Negative for headaches.   Hematological:  Negative for adenopathy. Does not bruise/bleed easily.   Psychiatric/Behavioral:  Negative for sleep disturbance.        Objective:   /66   Pulse 84   Ht 5' 1" (1.549 m)   Wt 55 kg (121 lb 4.1 oz)   BMI 22.91 kg/m²      Physical Exam   Eyes: Conjunctivae are normal.   Cardiovascular: Normal rate and regular rhythm.   Pulmonary/Chest: She has no wheezes. She has no rales.   Lymphadenopathy:     She has no cervical " adenopathy.   Skin: No rash noted.       Assessment:       1. Microscopic polyangiitis    2. Immunosuppression    3. High risk medications (not anticoagulants) long-term use    4. Pauci-immune vasculitis    5. Primary pauci-immune necrotizing and crescentic glomerulonephritis            Plan:       Problem List Items Addressed This Visit          Active Problems    Microscopic polyangiitis - Primary     Now 5 mo out from acute induction Rx for GPA complicated by lung hemorrhage and renal failure and is recovering; requires no O2, still requires HD 2/week    Has had no extra-renal lupus symptoms     Will Repeat lupus markers considering the overlapping features on renal biopsy    Will plan repeat rituximab in May (6 mo); 1000 mg x1 as maintenance Rx    Plan RTC me August for f/u mid-rituximab cycle           Relevant Orders    Anti-DNA Ab, Double-Stranded    C3 Complement    C4 Complement    CBC Auto Differential    Comprehensive Metabolic Panel    C-Reactive Protein    Protein/Creatinine Ratio, Urine    Sedimentation rate    Urinalysis    Hepatitis B Core Antibody, Total    Hepatitis B Surface Antigen    Pauci-immune vasculitis    Primary pauci-immune necrotizing and crescentic glomerulonephritis    High risk medications (not anticoagulants) long-term use     S/p profound immunosuppression but no current  Infections apparent    Will start shingles vaccination    Will continue atovaquone prophylaxis            Other Visit Diagnoses       Immunosuppression        Relevant Medications    atovaquone (MEPRON) 750 mg/5 mL Susp oral liquid    Other Relevant Orders    (In Office Administered) Zoster Recombinant Vaccine

## 2023-04-17 NOTE — ASSESSMENT & PLAN NOTE
Now 5 mo out from acute induction Rx for GPA complicated by lung hemorrhage and renal failure and is recovering; requires no O2, still requires HD 2/week    Has had no extra-renal lupus symptoms     Will Repeat lupus markers considering the overlapping features on renal biopsy    Will plan repeat rituximab in May (6 mo); 1000 mg x1 as maintenance Rx    Plan RTC me August for f/u mid-rituximab cycle

## 2023-04-18 LAB — DSDNA AB SER-ACNC: NORMAL [IU]/ML

## 2023-04-22 ENCOUNTER — HOSPITAL ENCOUNTER (EMERGENCY)
Facility: HOSPITAL | Age: 78
Discharge: HOME | End: 2023-04-22
Attending: EMERGENCY MEDICINE
Payer: MEDICARE

## 2023-04-22 VITALS
RESPIRATION RATE: 16 BRPM | DIASTOLIC BLOOD PRESSURE: 66 MMHG | TEMPERATURE: 98 F | OXYGEN SATURATION: 96 % | WEIGHT: 121 LBS | HEART RATE: 81 BPM | SYSTOLIC BLOOD PRESSURE: 119 MMHG | BODY MASS INDEX: 22.86 KG/M2

## 2023-04-22 DIAGNOSIS — T82.9XXA COMPLICATION ASSOCIATED WITH DIALYSIS CATHETER: Primary | ICD-10-CM

## 2023-04-22 LAB
ALBUMIN SERPL BCP-MCNC: 3.2 G/DL (ref 3.5–5.2)
ALP SERPL-CCNC: 64 U/L (ref 55–135)
ALT SERPL W/O P-5'-P-CCNC: 14 U/L (ref 10–44)
ANION GAP SERPL CALC-SCNC: 16 MMOL/L (ref 8–16)
AST SERPL-CCNC: 15 U/L (ref 10–40)
BASOPHILS # BLD AUTO: 0.05 K/UL (ref 0–0.2)
BASOPHILS NFR BLD: 0.4 % (ref 0–1.9)
BILIRUB SERPL-MCNC: 0.2 MG/DL (ref 0.1–1)
BUN SERPL-MCNC: 69 MG/DL (ref 8–23)
CALCIUM SERPL-MCNC: 9 MG/DL (ref 8.7–10.5)
CHLORIDE SERPL-SCNC: 99 MMOL/L (ref 95–110)
CO2 SERPL-SCNC: 23 MMOL/L (ref 23–29)
CREAT SERPL-MCNC: 4.9 MG/DL (ref 0.5–1.4)
DIFFERENTIAL METHOD: ABNORMAL
EOSINOPHIL # BLD AUTO: 0.1 K/UL (ref 0–0.5)
EOSINOPHIL NFR BLD: 0.4 % (ref 0–8)
ERYTHROCYTE [DISTWIDTH] IN BLOOD BY AUTOMATED COUNT: 16.7 % (ref 11.5–14.5)
EST. GFR  (NO RACE VARIABLE): 8.7 ML/MIN/1.73 M^2
GLUCOSE SERPL-MCNC: 167 MG/DL (ref 70–110)
HCT VFR BLD AUTO: 38.6 % (ref 37–48.5)
HGB BLD-MCNC: 11.7 G/DL (ref 12–16)
IMM GRANULOCYTES # BLD AUTO: 0.08 K/UL (ref 0–0.04)
IMM GRANULOCYTES NFR BLD AUTO: 0.6 % (ref 0–0.5)
LYMPHOCYTES # BLD AUTO: 1.7 K/UL (ref 1–4.8)
LYMPHOCYTES NFR BLD: 12.4 % (ref 18–48)
MAGNESIUM SERPL-MCNC: 2.2 MG/DL (ref 1.6–2.6)
MCH RBC QN AUTO: 28.1 PG (ref 27–31)
MCHC RBC AUTO-ENTMCNC: 30.3 G/DL (ref 32–36)
MCV RBC AUTO: 93 FL (ref 82–98)
MONOCYTES # BLD AUTO: 0.9 K/UL (ref 0.3–1)
MONOCYTES NFR BLD: 6.2 % (ref 4–15)
NEUTROPHILS # BLD AUTO: 11.2 K/UL (ref 1.8–7.7)
NEUTROPHILS NFR BLD: 80 % (ref 38–73)
NRBC BLD-RTO: 0 /100 WBC
PLATELET # BLD AUTO: 312 K/UL (ref 150–450)
PMV BLD AUTO: 10 FL (ref 9.2–12.9)
POTASSIUM SERPL-SCNC: 4.2 MMOL/L (ref 3.5–5.1)
PROT SERPL-MCNC: 6.9 G/DL (ref 6–8.4)
RBC # BLD AUTO: 4.17 M/UL (ref 4–5.4)
SODIUM SERPL-SCNC: 138 MMOL/L (ref 136–145)
WBC # BLD AUTO: 13.96 K/UL (ref 3.9–12.7)

## 2023-04-22 PROCEDURE — 99284 EMERGENCY DEPT VISIT MOD MDM: CPT | Mod: ,,, | Performed by: EMERGENCY MEDICINE

## 2023-04-22 PROCEDURE — 63600175 PHARM REV CODE 636 W HCPCS: Mod: JZ,JG | Performed by: EMERGENCY MEDICINE

## 2023-04-22 PROCEDURE — 80053 COMPREHEN METABOLIC PANEL: CPT | Performed by: EMERGENCY MEDICINE

## 2023-04-22 PROCEDURE — 85025 COMPLETE CBC W/AUTO DIFF WBC: CPT | Performed by: EMERGENCY MEDICINE

## 2023-04-22 PROCEDURE — 99284 PR EMERGENCY DEPT VISIT,LEVEL IV: ICD-10-PCS | Mod: ,,, | Performed by: EMERGENCY MEDICINE

## 2023-04-22 PROCEDURE — 83735 ASSAY OF MAGNESIUM: CPT | Performed by: EMERGENCY MEDICINE

## 2023-04-22 PROCEDURE — 99284 EMERGENCY DEPT VISIT MOD MDM: CPT | Mod: 25

## 2023-04-22 RX ADMIN — ALTEPLASE 2 MG: 2.2 INJECTION, POWDER, LYOPHILIZED, FOR SOLUTION INTRAVENOUS at 01:04

## 2023-04-22 NOTE — ED TRIAGE NOTES
"Patient reports being unable to have dialysis completed today due to "blood clot" obstructing the port. Patient also endorses she was told that "air was coming from her port line". Patient with swelling to left neck--soft and non tender. No bleeding or discharge noted from port site. Patient was unable to complete any of her dialysis session today.  "

## 2023-04-24 ENCOUNTER — TELEPHONE (OUTPATIENT)
Dept: EMERGENCY MEDICINE | Facility: HOSPITAL | Age: 78
End: 2023-04-24
Payer: MEDICARE

## 2023-04-24 DIAGNOSIS — T82.9XXA COMPLICATION ASSOCIATED WITH DIALYSIS CATHETER: Primary | ICD-10-CM

## 2023-04-24 PROBLEM — N39.0 URINARY TRACT INFECTION DUE TO EXTENDED-SPECTRUM BETA LACTAMASE (ESBL) PRODUCING ESCHERICHIA COLI: Status: RESOLVED | Noted: 2022-12-13 | Resolved: 2023-04-24

## 2023-04-24 PROBLEM — B96.29 URINARY TRACT INFECTION DUE TO EXTENDED-SPECTRUM BETA LACTAMASE (ESBL) PRODUCING ESCHERICHIA COLI: Status: RESOLVED | Noted: 2022-12-13 | Resolved: 2023-04-24

## 2023-04-24 PROBLEM — Z16.12 URINARY TRACT INFECTION DUE TO EXTENDED-SPECTRUM BETA LACTAMASE (ESBL) PRODUCING ESCHERICHIA COLI: Status: RESOLVED | Noted: 2022-12-13 | Resolved: 2023-04-24

## 2023-04-25 ENCOUNTER — TELEPHONE (OUTPATIENT)
Dept: INTERVENTIONAL RADIOLOGY/VASCULAR | Facility: CLINIC | Age: 78
End: 2023-04-25
Payer: MEDICARE

## 2023-04-25 DIAGNOSIS — Z45.2 ENCOUNTER FOR CARE RELATED TO VASCULAR ACCESS PORT: Primary | ICD-10-CM

## 2023-04-25 NOTE — TELEPHONE ENCOUNTER
Spoke to pts daughter on phone,  Pt is scheduled on 4/28/2023 for IR procedure at  location.  Preop instructions given (NPO after midnight, MUST have a ride home, Nurse will call 1-2  days before to go over instructions and medications), instructed pt to stay off Eliquis for 3 days, can lay flat w/o getting short of breath or adjusting her position for least 45min-1hr w/o any pillows. pt verbally understood. Pt aware and confirmed, Thanks

## 2023-04-28 ENCOUNTER — HOSPITAL ENCOUNTER (OUTPATIENT)
Dept: INTERVENTIONAL RADIOLOGY/VASCULAR | Facility: HOSPITAL | Age: 78
Discharge: HOME | End: 2023-04-28
Payer: MEDICARE

## 2023-04-28 VITALS
HEART RATE: 61 BPM | OXYGEN SATURATION: 100 % | TEMPERATURE: 98 F | DIASTOLIC BLOOD PRESSURE: 67 MMHG | SYSTOLIC BLOOD PRESSURE: 122 MMHG | BODY MASS INDEX: 22.47 KG/M2 | HEIGHT: 61 IN | WEIGHT: 119 LBS | RESPIRATION RATE: 10 BRPM

## 2023-04-28 DIAGNOSIS — Z45.2 ENCOUNTER FOR CARE RELATED TO VASCULAR ACCESS PORT: Primary | ICD-10-CM

## 2023-04-28 PROCEDURE — 99152 PR MOD CONSCIOUS SEDATION, SAME PHYS, 5+ YRS, FIRST 15 MIN: ICD-10-PCS | Mod: ,,, | Performed by: STUDENT IN AN ORGANIZED HEALTH CARE EDUCATION/TRAINING PROGRAM

## 2023-04-28 PROCEDURE — 36581 IR TUNNELED CATH REMOVAL W/O PORT: ICD-10-PCS | Mod: LT,,, | Performed by: STUDENT IN AN ORGANIZED HEALTH CARE EDUCATION/TRAINING PROGRAM

## 2023-04-28 PROCEDURE — A4550 SURGICAL TRAYS: HCPCS

## 2023-04-28 PROCEDURE — 77001 FLUOROGUIDE FOR VEIN DEVICE: CPT | Mod: TC | Performed by: STUDENT IN AN ORGANIZED HEALTH CARE EDUCATION/TRAINING PROGRAM

## 2023-04-28 PROCEDURE — 63600175 PHARM REV CODE 636 W HCPCS: Performed by: STUDENT IN AN ORGANIZED HEALTH CARE EDUCATION/TRAINING PROGRAM

## 2023-04-28 PROCEDURE — 77001 FLUOROGUIDE FOR VEIN DEVICE: CPT | Mod: 26,,, | Performed by: STUDENT IN AN ORGANIZED HEALTH CARE EDUCATION/TRAINING PROGRAM

## 2023-04-28 PROCEDURE — 25000003 PHARM REV CODE 250: Performed by: STUDENT IN AN ORGANIZED HEALTH CARE EDUCATION/TRAINING PROGRAM

## 2023-04-28 PROCEDURE — 99152 MOD SED SAME PHYS/QHP 5/>YRS: CPT | Performed by: STUDENT IN AN ORGANIZED HEALTH CARE EDUCATION/TRAINING PROGRAM

## 2023-04-28 PROCEDURE — 36581 REPLACE TUNNELED CV CATH: CPT | Mod: LT | Performed by: STUDENT IN AN ORGANIZED HEALTH CARE EDUCATION/TRAINING PROGRAM

## 2023-04-28 PROCEDURE — 77001 CHG FLUOROGUIDE CNTRL VEN ACCESS,PLACE,REPLACE,REMOVE: ICD-10-PCS | Mod: 26,,, | Performed by: STUDENT IN AN ORGANIZED HEALTH CARE EDUCATION/TRAINING PROGRAM

## 2023-04-28 PROCEDURE — 99152 MOD SED SAME PHYS/QHP 5/>YRS: CPT | Mod: ,,, | Performed by: STUDENT IN AN ORGANIZED HEALTH CARE EDUCATION/TRAINING PROGRAM

## 2023-04-28 RX ORDER — SODIUM CHLORIDE 9 MG/ML
75 INJECTION, SOLUTION INTRAVENOUS CONTINUOUS
Status: DISCONTINUED | OUTPATIENT
Start: 2023-04-28 | End: 2023-04-29 | Stop reason: HOSPADM

## 2023-04-28 RX ORDER — MIDAZOLAM HYDROCHLORIDE 1 MG/ML
INJECTION INTRAMUSCULAR; INTRAVENOUS
Status: COMPLETED | OUTPATIENT
Start: 2023-04-28 | End: 2023-04-28

## 2023-04-28 RX ORDER — FENTANYL CITRATE 50 UG/ML
INJECTION, SOLUTION INTRAMUSCULAR; INTRAVENOUS
Status: COMPLETED | OUTPATIENT
Start: 2023-04-28 | End: 2023-04-28

## 2023-04-28 RX ORDER — LIDOCAINE HYDROCHLORIDE 10 MG/ML
1 INJECTION, SOLUTION EPIDURAL; INFILTRATION; INTRACAUDAL; PERINEURAL ONCE
Status: DISCONTINUED | OUTPATIENT
Start: 2023-04-28 | End: 2023-04-29 | Stop reason: HOSPADM

## 2023-04-28 RX ORDER — LIDOCAINE HYDROCHLORIDE 10 MG/ML
INJECTION INFILTRATION; PERINEURAL
Status: COMPLETED | OUTPATIENT
Start: 2023-04-28 | End: 2023-04-28

## 2023-04-28 RX ORDER — HEPARIN SODIUM 1000 [USP'U]/ML
INJECTION, SOLUTION INTRAVENOUS; SUBCUTANEOUS
Status: COMPLETED | OUTPATIENT
Start: 2023-04-28 | End: 2023-04-28

## 2023-04-28 RX ADMIN — FENTANYL CITRATE 50 MCG: 50 INJECTION, SOLUTION INTRAMUSCULAR; INTRAVENOUS at 12:04

## 2023-04-28 RX ADMIN — LIDOCAINE HYDROCHLORIDE 10 ML: 10 INJECTION, SOLUTION INFILTRATION; PERINEURAL at 12:04

## 2023-04-28 RX ADMIN — HEPARIN SODIUM 3600 UNITS: 1000 INJECTION, SOLUTION INTRAVENOUS; SUBCUTANEOUS at 12:04

## 2023-04-28 RX ADMIN — MIDAZOLAM HYDROCHLORIDE 1 MG: 1 INJECTION INTRAMUSCULAR; INTRAVENOUS at 12:04

## 2023-04-28 NOTE — PLAN OF CARE
Pt arrived to  for HD cath exchange. Pt oriented to unit and staff. Plan of care reviewed with patient, patient verbalizes understanding. Comfort measures utilized. Pt safely transferred from stretcher to procedural table. Fall risk reviewed with patient, fall risk interventions maintained. Safety strap applied, positioner pillows utilized to minimize pressure points. Blankets applied. Pt prepped and draped utilizing standard sterile technique. Patient placed on continuous monitoring, as required by sedation policy. Timeouts completed utilizing standard universal time-out, per department and facility policy. RN to remain at bedside, continuous monitoring maintained. Pt resting comfortably. Denies pain/discomfort. Will continue to monitor. See flow sheets for monitoring, medication administration, and updates.
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
## 2023-04-28 NOTE — PLAN OF CARE
Pt is AAOx4 and denies pain. VSS.  Admit questions completed.  IV access obtained.  Daughter at bedside.  Will continue to monitor.

## 2023-04-28 NOTE — PROCEDURES
IR POST PROCEDURE NOTE    Date of Procedure: 4/28/2023    Procedure: Tunneled dialysis catheter removal and exchange    Pre-Procedure Diagnosis: Dialysis catheter malfunction    Post-Procedure Diagnosis: Same    Procedure Personnel: Jefferson Hernandez MD and Jimbo Gama MD    Written Informed Consent Obtained: Yes    Specimen Removed: None    Estimated Blood Loss: Minimal    Findings: Under fluoroscopic guidance, pre-existing dialysis catheter was removed. A new [employer] GlidePath 14.5Fr, 23cm dual lumen catheter was advanced via the left internal jugular vein access. Catheter tip in the right atrium. Both lumens flushed and aspirated adequately and were heparinized. Catheter ok for immediate use.    Complications: None immediate.      Jimbo Gama MD  Fellow, Dept. Of Interventional Radiology  [facility]

## 2023-04-28 NOTE — PLAN OF CARE
HD catheter exchange complete. 1800 units heparin in each lumen of new HD cath. Pt tolerated well. VSS. No signs or symptoms of distress noted.  Site CDI.  Pt will be transferred to MPU bed 6 and report to be given at bedside.

## 2023-04-28 NOTE — DISCHARGE SUMMARY
Radiology Discharge Summary      Hospital Course: No complications    Admit Date: 4/28/2023  Discharge Date: 04/28/2023     Instructions Given to Patient: Yes  Diet: Resume prior diet  Activity: activity as tolerated and no driving for today    Description of Condition on Discharge: Stable  Vital Signs (Most Recent): Temp: 97.9 °F (36.6 °C) (04/28/23 1121)  Pulse: 64 (04/28/23 1259)  Resp: 18 (04/28/23 1259)  BP: 116/65 (04/28/23 1259)  SpO2: 100 % (04/28/23 1259)    Discharge Disposition: Home    Discharge Diagnosis: Status post tunneled dialysis catheter exchange without immediate complication.    Follow Up: With primary care team.    Jimbo Gama MD  Fellow, Dept.Of Interventional Radiology  [facility]

## 2023-05-01 PROBLEM — Z99.2 ACUTE RENAL FAILURE ON DIALYSIS: Status: RESOLVED | Noted: 2022-10-26 | Resolved: 2023-05-01

## 2023-05-01 PROBLEM — N17.9 ACUTE RENAL FAILURE ON DIALYSIS: Status: RESOLVED | Noted: 2022-10-26 | Resolved: 2023-05-01

## 2023-05-09 ENCOUNTER — TELEPHONE (OUTPATIENT)
Dept: RHEUMATOLOGY | Facility: CLINIC | Age: 78
End: 2023-05-09
Payer: MEDICARE

## 2023-05-09 NOTE — TELEPHONE ENCOUNTER
----- Message from Woo Palacio MD sent at 5/9/2023  5:11 PM CDT -----  Yany tell her to take her usual a.m. meds; no special changes needed.   She had this same medicine (rituximab) in the hospital and had no unusual side effects, and the [facility] nurses are very familiar with it. She will be fine.  WD  ----- Message -----  From: Yany Ellison RN  Sent: 5/8/2023   4:45 PM CDT  To: Woo Palacio MD      ----- Message -----  From: Phoebe Skaggs  Sent: 5/8/2023  11:26 AM CDT  To: Hake[facility] KRISHNAN Staff    Kristin Goodman [facility] calling regarding Patient Advice for wanting to know if there is any medications that should be not taken prior to the infusion or any other instructions seeding to know because this is the PT first time and she id concerned, call back [phone]

## 2023-05-09 NOTE — TELEPHONE ENCOUNTER
As per Dr. Palacio  patient 's daughter informed--- to take her usual a.m. meds; no special changes needed.   She had this same medicine (rituximab) in the hospital and had no unusual side effects, and the [facility] nurses are very familiar with it. She will be fine., verbalizes understanding.

## 2023-05-10 ENCOUNTER — INFUSION (OUTPATIENT)
Dept: INFUSION THERAPY | Facility: HOSPITAL | Age: 78
End: 2023-05-10
Attending: INTERNAL MEDICINE
Payer: MEDICARE

## 2023-05-10 VITALS
HEART RATE: 73 BPM | RESPIRATION RATE: 16 BRPM | SYSTOLIC BLOOD PRESSURE: 121 MMHG | TEMPERATURE: 98 F | DIASTOLIC BLOOD PRESSURE: 56 MMHG | WEIGHT: 120.13 LBS | BODY MASS INDEX: 22.68 KG/M2 | HEIGHT: 61 IN

## 2023-05-10 DIAGNOSIS — M31.7 MICROSCOPIC POLYANGIITIS: Primary | ICD-10-CM

## 2023-05-10 PROCEDURE — 96375 TX/PRO/DX INJ NEW DRUG ADDON: CPT

## 2023-05-10 PROCEDURE — 96367 TX/PROPH/DG ADDL SEQ IV INF: CPT

## 2023-05-10 PROCEDURE — 63600175 PHARM REV CODE 636 W HCPCS: Mod: JZ,TB | Performed by: INTERNAL MEDICINE

## 2023-05-10 PROCEDURE — 25000003 PHARM REV CODE 250: Performed by: INTERNAL MEDICINE

## 2023-05-10 PROCEDURE — 96365 THER/PROPH/DIAG IV INF INIT: CPT

## 2023-05-10 PROCEDURE — 96366 THER/PROPH/DIAG IV INF ADDON: CPT

## 2023-05-10 RX ORDER — MEPERIDINE HYDROCHLORIDE 50 MG/ML
25 INJECTION INTRAMUSCULAR; INTRAVENOUS; SUBCUTANEOUS
Status: CANCELLED | OUTPATIENT
Start: 2023-05-24 | End: 2023-05-25

## 2023-05-10 RX ORDER — DIPHENHYDRAMINE HYDROCHLORIDE 50 MG/ML
50 INJECTION INTRAMUSCULAR; INTRAVENOUS ONCE AS NEEDED
Status: CANCELLED | OUTPATIENT
Start: 2023-05-24

## 2023-05-10 RX ORDER — ACETAMINOPHEN 325 MG/1
650 TABLET ORAL
Status: COMPLETED | OUTPATIENT
Start: 2023-05-10 | End: 2023-05-10

## 2023-05-10 RX ORDER — ACETAMINOPHEN 325 MG/1
650 TABLET ORAL
Status: CANCELLED | OUTPATIENT
Start: 2023-05-24

## 2023-05-10 RX ORDER — SODIUM CHLORIDE 0.9 % (FLUSH) 0.9 %
10 SYRINGE (ML) INJECTION
Status: CANCELLED | OUTPATIENT
Start: 2023-05-24

## 2023-05-10 RX ORDER — HEPARIN 100 UNIT/ML
500 SYRINGE INTRAVENOUS
Status: CANCELLED | OUTPATIENT
Start: 2023-05-24

## 2023-05-10 RX ORDER — FAMOTIDINE 10 MG/ML
20 INJECTION INTRAVENOUS
Status: CANCELLED | OUTPATIENT
Start: 2023-05-24

## 2023-05-10 RX ORDER — FAMOTIDINE 10 MG/ML
20 INJECTION INTRAVENOUS
Status: COMPLETED | OUTPATIENT
Start: 2023-05-10 | End: 2023-05-10

## 2023-05-10 RX ORDER — EPINEPHRINE 0.3 MG/.3ML
0.3 INJECTION SUBCUTANEOUS ONCE AS NEEDED
Status: CANCELLED | OUTPATIENT
Start: 2023-05-24

## 2023-05-10 RX ADMIN — ACETAMINOPHEN 650 MG: 325 TABLET ORAL at 08:05

## 2023-05-10 RX ADMIN — DIPHENHYDRAMINE HYDROCHLORIDE 50 MG: 50 INJECTION, SOLUTION INTRAMUSCULAR; INTRAVENOUS at 08:05

## 2023-05-10 RX ADMIN — FAMOTIDINE 20 MG: 10 INJECTION INTRAVENOUS at 08:05

## 2023-05-10 RX ADMIN — SODIUM CHLORIDE 1000 MG: 0.9 INJECTION, SOLUTION INTRAVENOUS at 09:05

## 2023-05-10 NOTE — PLAN OF CARE
Problem: Adult Inpatient Plan of Care  Goal: Plan of Care Review  Outcome: Ongoing, Progressing   Patient tolerated Rituximab-pvvr infusion well today. Titrated 50 ml/hr until 400 ml/hr. NAD noted. PIV + blood return upon deaccess. Discharged via w/c with daughter by her side

## 2023-05-19 DIAGNOSIS — E03.9 ACQUIRED HYPOTHYROIDISM: ICD-10-CM

## 2023-05-19 DIAGNOSIS — M94.261 CHONDROMALACIA OF RIGHT KNEE: ICD-10-CM

## 2023-05-19 DIAGNOSIS — D84.9 IMMUNOSUPPRESSION: ICD-10-CM

## 2023-05-19 DIAGNOSIS — S83.241D ACUTE MEDIAL MENISCUS TEAR OF RIGHT KNEE, SUBSEQUENT ENCOUNTER: ICD-10-CM

## 2023-05-19 RX ORDER — QUETIAPINE FUMARATE 25 MG/1
25 TABLET, FILM COATED ORAL NIGHTLY
Qty: 90 TABLET | Refills: 0 | Status: SHIPPED | OUTPATIENT
Start: 2023-05-19 | End: 2023-07-28 | Stop reason: SDUPTHER

## 2023-05-19 RX ORDER — FUROSEMIDE 40 MG/1
40 TABLET ORAL DAILY
Qty: 90 TABLET | Refills: 0 | Status: SHIPPED | OUTPATIENT
Start: 2023-05-19 | End: 2023-07-17

## 2023-05-19 RX ORDER — HYDRALAZINE HYDROCHLORIDE 50 MG/1
50 TABLET, FILM COATED ORAL EVERY 8 HOURS
Qty: 270 TABLET | Refills: 0 | Status: ON HOLD | OUTPATIENT
Start: 2023-05-19 | End: 2023-08-09 | Stop reason: HOSPADM

## 2023-05-19 RX ORDER — ATOVAQUONE 750 MG/5ML
1500 SUSPENSION ORAL DAILY
Qty: 300 ML | Refills: 0 | Status: SHIPPED | OUTPATIENT
Start: 2023-05-19 | End: 2023-06-26 | Stop reason: SDUPTHER

## 2023-05-19 RX ORDER — LEVOTHYROXINE SODIUM 25 UG/1
25 TABLET ORAL DAILY
Qty: 90 TABLET | Refills: 0 | Status: SHIPPED | OUTPATIENT
Start: 2023-05-19 | End: 2023-07-28 | Stop reason: SDUPTHER

## 2023-05-19 RX ORDER — METHOCARBAMOL 500 MG/1
500 TABLET, FILM COATED ORAL 3 TIMES DAILY PRN
Qty: 30 TABLET | Refills: 0 | Status: SHIPPED | OUTPATIENT
Start: 2023-05-19 | End: 2023-06-07 | Stop reason: SDUPTHER

## 2023-05-19 RX ORDER — CARVEDILOL 12.5 MG/1
12.5 TABLET ORAL 2 TIMES DAILY
Qty: 180 TABLET | Refills: 2 | Status: SHIPPED | OUTPATIENT
Start: 2023-05-19 | End: 2023-07-28 | Stop reason: SDUPTHER

## 2023-05-19 NOTE — TELEPHONE ENCOUNTER
No care due was identified.  [facility] Embedded Care Due Messages. Reference number: [id number].   5/19/2023 9:26:38 AM CDT

## 2023-05-24 DIAGNOSIS — I10 PRIMARY HYPERTENSION: ICD-10-CM

## 2023-05-24 RX ORDER — AMLODIPINE BESYLATE 10 MG/1
10 TABLET ORAL DAILY
Qty: 60 TABLET | Refills: 0 | Status: ON HOLD | OUTPATIENT
Start: 2023-05-24 | End: 2023-08-09 | Stop reason: SDUPTHER

## 2023-05-31 NOTE — PROGRESS NOTES
Spoke with Karishma verbalized understanding no further concerns.    Subjective:       Patient ID: Kristin Goodman is a 76 y.o. female.    Chief Complaint: sking itching and Shortness of Breath    HPI  77 y/o with pulm htn, diastolic HF, DONAVAN presents for SOB and cough. Pt states SOB and cough started 3 weeks ago and is only on exertion. She recently had a meniscus surgery on 12/21/21 and completed 6 weeks of PT after without any complications or respiratory problems. PT consisted of stretching, strength exercises, and cardio. Her symptoms currently cause her to take breaks often to catch her breath while doing chores around the house or walking around her neighborhood. Pt has not taken any medication to relieve symptoms. Pt states she has no history of allergies. Pt currently sleeps with three pillows and wakes up in the middle of the night feeling like she cannot catch her breath. Pt says she feels SOB when lying flat.  Pt denies fever, chills, leg swelling, claudication, chest pain, palpitations, or wheezing.    Skin Itching:  Pt presents with itching of the skin. After her knee surgery in December, she had more itching around her knee. Now she states it feels like it is all over. She stated that she changed her soap and daily moisturizer with no improvement after surgery. She states that it slowly improved but has gradually progressed in the last three weeks. Pt states that she does not have a history of allergies.  Pt reports of having continuous sneezing, rhinorrhea, visual disturbances, and red/itchy eyes for a few days within the past three weeks as well. Pt has not taken any medication to relieve symptoms.     Social History     Socioeconomic History    Marital status:    Tobacco Use    Smoking status: Former Smoker     Packs/day: 0.50     Years: 6.00     Pack years: 3.00    Smokeless tobacco: Never Used    Tobacco comment: Quit ~ 30 years ago   Substance and Sexual Activity    Alcohol use: No    Drug use: No    Sexual activity: Never     Partners: Male          Review of Systems   Constitutional: Negative for chills, fatigue and fever.   HENT: Positive for rhinorrhea and sneezing. Negative for nasal congestion, ear discharge, ear pain, postnasal drip, sinus pressure/congestion and sore throat.    Eyes: Positive for redness, itching and visual disturbance. Negative for pain.   Respiratory: Positive for cough and shortness of breath. Negative for chest tightness and wheezing.    Cardiovascular: Positive for palpitations. Negative for chest pain, leg swelling and claudication.   Musculoskeletal: Negative for leg pain and joint deformity.   Allergic/Immunologic: Negative for environmental allergies.         Objective:      Physical Exam  Constitutional:       Appearance: Normal appearance.   HENT:      Head: Normocephalic and atraumatic.      Right Ear: Tympanic membrane normal.      Left Ear: Tympanic membrane normal.      Nose:      Right Turbinates: Pale.      Right Sinus: No maxillary sinus tenderness or frontal sinus tenderness.      Left Sinus: No maxillary sinus tenderness or frontal sinus tenderness.      Mouth/Throat:      Mouth: Mucous membranes are moist.      Pharynx: Oropharynx is clear.   Cardiovascular:      Heart sounds: Normal heart sounds.   Pulmonary:      Breath sounds: Normal breath sounds.      Comments: Pt finds it more difficult to take a deep breath on exam. Pt completed a 5 minute walk test w/o complications,  O2 sat. was 96-97%.   Lymphadenopathy:      Cervical: No cervical adenopathy.   Neurological:      Mental Status: She is alert.         Assessment:       Problem List Items Addressed This Visit     CAREY (dyspnea on exertion) - Primary    Relevant Medications    albuterol (VENTOLIN HFA) 90 mcg/actuation inhaler    Other Relevant Orders    Complete PFT with bronchodilator    PULSE OXIMETRY WITH REST - PULM      Other Visit Diagnoses     Allergic rhinitis, unspecified seasonality, unspecified trigger        Relevant Medications     levocetirizine (XYZAL) 5 MG tablet    fluticasone propionate (FLONASE) 50 mcg/actuation nasal spray          Plan:         CAREY (dyspnea on exertion)  -     Complete PFT with bronchodilator; Future  -     PULSE OXIMETRY WITH REST - PULM; Future  -     albuterol (VENTOLIN HFA) 90 mcg/actuation inhaler; Inhale 2 puffs into the lungs every 6 (six) hours as needed for Shortness of Breath. Rescue  Dispense: 18 g; Refill: 0  -     Pt had workup with cardiology (echo) in dec prior to surgery, advised f/u with pulmonology to r/o pulmonary cause. No h/o smoking. Will assess pft and give inhaler for trial. She will notify me if symptoms change or worsen    Allergic rhinitis, unspecified seasonality, unspecified trigger  -     levocetirizine (XYZAL) 5 MG tablet; Take 1 tablet (5 mg total) by mouth every evening. For sinus  Dispense: 30 tablet; Refill: 0  -     fluticasone propionate (FLONASE) 50 mcg/actuation nasal spray; 1 spray (50 mcg total) by Each Nostril route 2 (two) times daily.  Dispense: 16 g; Refill: 0      I hereby acknowledge that I am relying upon documentation authored by a PA student working under my supervision and further I hereby attest that I have verified the student documentation or findings by personally re-performing the physical exam and medical decision making activities of the Evaluation and Management service to be billed.  Arlene Billings

## 2023-06-07 DIAGNOSIS — S83.241D ACUTE MEDIAL MENISCUS TEAR OF RIGHT KNEE, SUBSEQUENT ENCOUNTER: ICD-10-CM

## 2023-06-07 DIAGNOSIS — M94.261 CHONDROMALACIA OF RIGHT KNEE: ICD-10-CM

## 2023-06-07 RX ORDER — METHOCARBAMOL 500 MG/1
500 TABLET, FILM COATED ORAL 3 TIMES DAILY PRN
Qty: 30 TABLET | Refills: 0 | Status: SHIPPED | OUTPATIENT
Start: 2023-06-07 | End: 2023-06-23 | Stop reason: SDUPTHER

## 2023-06-07 NOTE — TELEPHONE ENCOUNTER
No care due was identified.  [facility] Embedded Care Due Messages. Reference number: [id number].   6/07/2023 9:42:05 AM CDT

## 2023-06-14 RX ORDER — APIXABAN 5 MG/1
TABLET, FILM COATED ORAL
Qty: 60 TABLET | Refills: 0 | Status: SHIPPED | OUTPATIENT
Start: 2023-06-14 | End: 2023-07-17

## 2023-06-22 ENCOUNTER — HOSPITAL ENCOUNTER (EMERGENCY)
Facility: HOSPITAL | Age: 78
Discharge: HOME | End: 2023-06-22
Attending: EMERGENCY MEDICINE
Payer: MEDICARE

## 2023-06-22 VITALS
OXYGEN SATURATION: 100 % | SYSTOLIC BLOOD PRESSURE: 143 MMHG | DIASTOLIC BLOOD PRESSURE: 70 MMHG | WEIGHT: 119.69 LBS | BODY MASS INDEX: 22.6 KG/M2 | HEIGHT: 61 IN | HEART RATE: 70 BPM | RESPIRATION RATE: 15 BRPM | TEMPERATURE: 98 F

## 2023-06-22 DIAGNOSIS — R22.2 LOCALIZED SWELLING OF CHEST WALL: ICD-10-CM

## 2023-06-22 LAB
ALBUMIN SERPL BCP-MCNC: 3.7 G/DL (ref 3.5–5.2)
ALP SERPL-CCNC: 69 U/L (ref 55–135)
ALT SERPL W/O P-5'-P-CCNC: 63 U/L (ref 10–44)
ANION GAP SERPL CALC-SCNC: 15 MMOL/L (ref 8–16)
AST SERPL-CCNC: 31 U/L (ref 10–40)
BASOPHILS # BLD AUTO: 0.06 K/UL (ref 0–0.2)
BASOPHILS NFR BLD: 0.5 % (ref 0–1.9)
BILIRUB SERPL-MCNC: 0.2 MG/DL (ref 0.1–1)
BUN SERPL-MCNC: 46 MG/DL (ref 8–23)
CALCIUM SERPL-MCNC: 9.6 MG/DL (ref 8.7–10.5)
CHLORIDE SERPL-SCNC: 101 MMOL/L (ref 95–110)
CO2 SERPL-SCNC: 23 MMOL/L (ref 23–29)
CREAT SERPL-MCNC: 4.1 MG/DL (ref 0.5–1.4)
CREAT SERPL-MCNC: 4.6 MG/DL (ref 0.5–1.4)
DIFFERENTIAL METHOD: ABNORMAL
EOSINOPHIL # BLD AUTO: 0 K/UL (ref 0–0.5)
EOSINOPHIL NFR BLD: 0.2 % (ref 0–8)
ERYTHROCYTE [DISTWIDTH] IN BLOOD BY AUTOMATED COUNT: 17.8 % (ref 11.5–14.5)
EST. GFR  (NO RACE VARIABLE): 10.7 ML/MIN/1.73 M^2
GLUCOSE SERPL-MCNC: 95 MG/DL (ref 70–110)
HCT VFR BLD AUTO: 38.9 % (ref 37–48.5)
HGB BLD-MCNC: 12.1 G/DL (ref 12–16)
IMM GRANULOCYTES # BLD AUTO: 0.63 K/UL (ref 0–0.04)
IMM GRANULOCYTES NFR BLD AUTO: 5 % (ref 0–0.5)
LYMPHOCYTES # BLD AUTO: 2.2 K/UL (ref 1–4.8)
LYMPHOCYTES NFR BLD: 17.9 % (ref 18–48)
MCH RBC QN AUTO: 28.1 PG (ref 27–31)
MCHC RBC AUTO-ENTMCNC: 31.1 G/DL (ref 32–36)
MCV RBC AUTO: 91 FL (ref 82–98)
MONOCYTES # BLD AUTO: 0.8 K/UL (ref 0.3–1)
MONOCYTES NFR BLD: 6.6 % (ref 4–15)
NEUTROPHILS # BLD AUTO: 8.7 K/UL (ref 1.8–7.7)
NEUTROPHILS NFR BLD: 69.8 % (ref 38–73)
NRBC BLD-RTO: 0 /100 WBC
PLATELET # BLD AUTO: 310 K/UL (ref 150–450)
PMV BLD AUTO: 9.6 FL (ref 9.2–12.9)
POTASSIUM SERPL-SCNC: 3.9 MMOL/L (ref 3.5–5.1)
PROT SERPL-MCNC: 7.3 G/DL (ref 6–8.4)
RBC # BLD AUTO: 4.3 M/UL (ref 4–5.4)
SAMPLE: ABNORMAL
SODIUM SERPL-SCNC: 139 MMOL/L (ref 136–145)
WBC # BLD AUTO: 12.5 K/UL (ref 3.9–12.7)

## 2023-06-22 PROCEDURE — 85027 COMPLETE CBC AUTOMATED: CPT

## 2023-06-22 PROCEDURE — 82565 ASSAY OF CREATININE: CPT

## 2023-06-22 PROCEDURE — 85007 BL SMEAR W/DIFF WBC COUNT: CPT

## 2023-06-22 PROCEDURE — 82565 ASSAY OF CREATININE: CPT | Mod: 91

## 2023-06-22 PROCEDURE — 99900035 HC TECH TIME PER 15 MIN (STAT)

## 2023-06-22 PROCEDURE — 80053 COMPREHEN METABOLIC PANEL: CPT

## 2023-06-22 PROCEDURE — 99285 EMERGENCY DEPT VISIT HI MDM: CPT | Mod: 25
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
## 2023-06-22 NOTE — DISCHARGE INSTRUCTIONS
Diagnosis: swelling near HD port     Tests today showed:   Labs Reviewed   CBC W/ AUTO DIFFERENTIAL - Abnormal; Notable for the following components:       Result Value    MCHC 31.1 (*)     RDW 17.8 (*)     Immature Granulocytes 5.0 (*)     Gran # (ANC) 8.7 (*)     Immature Grans (Abs) 0.63 (*)     Lymph % 17.9 (*)     All other components within normal limits   COMPREHENSIVE METABOLIC PANEL - Abnormal; Notable for the following components:    BUN 46 (*)     Creatinine 4.1 (*)     ALT 63 (*)     eGFR 10.7 (*)     All other components within normal limits   ISTAT CREATININE - Abnormal; Notable for the following components:    POC Creatinine 4.6 (*)     All other components within normal limits   POCT CREATININE     X-Ray Chest PA And Lateral   Final Result      1. No acute cardiopulmonary process.         Electronically signed by: Champ Harvey MD   Date:    06/22/2023   Time:    12:03      CT Chest Without Contrast    (Results Pending)       Treatments you had today:   Medications - No data to display    Follow-Up Plan:  - Follow-up with primary care doctor within 3 - 5 days  - Additional testing and/or evaluation as directed by your primary doctor    Return to the Emergency Department for symptoms including but not limited to: worsening symptoms, shortness of breath or chest pain, vomiting with inability to hold down fluids, fevers greater than 100.4°F, passing out/fainting/unconsciousness, or other concerning symptoms.

## 2023-06-22 NOTE — ED TRIAGE NOTES
Patients presents to the ED with swelling in the left side of neck. Patient has Vascular access to the left upper chest. Patient is a dialysis patient for [facility], every Tuesday and Saturday. Patient last dialysis was Tuesday. Patient denies Chest pain, SOB, and fatigue.    X-ray at bedside Chart(s)/Patient/Child

## 2023-06-23 ENCOUNTER — PATIENT OUTREACH (OUTPATIENT)
Dept: EMERGENCY MEDICINE | Facility: HOSPITAL | Age: 78
End: 2023-06-23
Payer: MEDICARE

## 2023-06-23 DIAGNOSIS — M94.261 CHONDROMALACIA OF RIGHT KNEE: ICD-10-CM

## 2023-06-23 DIAGNOSIS — S83.241D ACUTE MEDIAL MENISCUS TEAR OF RIGHT KNEE, SUBSEQUENT ENCOUNTER: ICD-10-CM

## 2023-06-23 RX ORDER — METHOCARBAMOL 500 MG/1
500 TABLET, FILM COATED ORAL 3 TIMES DAILY PRN
Qty: 60 TABLET | Refills: 0 | Status: SHIPPED | OUTPATIENT
Start: 2023-06-23 | End: 2023-07-18 | Stop reason: SDUPTHER

## 2023-06-23 NOTE — TELEPHONE ENCOUNTER
No care due was identified.  [facility] Embedded Care Due Messages. Reference number: [id number].   6/23/2023 7:20:56 AM CDT

## 2023-06-26 DIAGNOSIS — D84.9 IMMUNOSUPPRESSION: ICD-10-CM

## 2023-06-27 RX ORDER — ATOVAQUONE 750 MG/5ML
1500 SUSPENSION ORAL DAILY
Qty: 300 ML | Refills: 0 | Status: SHIPPED | OUTPATIENT
Start: 2023-06-27 | End: 2023-07-28 | Stop reason: SDUPTHER

## 2023-07-17 ENCOUNTER — PATIENT OUTREACH (OUTPATIENT)
Dept: EMERGENCY MEDICINE | Facility: HOSPITAL | Age: 78
End: 2023-07-17
Payer: MEDICARE

## 2023-07-17 ENCOUNTER — OFFICE VISIT (OUTPATIENT)
Dept: RHEUMATOLOGY | Facility: CLINIC | Age: 78
End: 2023-07-17
Payer: MEDICARE

## 2023-07-17 VITALS
SYSTOLIC BLOOD PRESSURE: 135 MMHG | WEIGHT: 123.88 LBS | DIASTOLIC BLOOD PRESSURE: 70 MMHG | BODY MASS INDEX: 23.41 KG/M2 | HEART RATE: 77 BPM

## 2023-07-17 DIAGNOSIS — Z79.899 HIGH RISK MEDICATIONS (NOT ANTICOAGULANTS) LONG-TERM USE: ICD-10-CM

## 2023-07-17 DIAGNOSIS — M31.7 MICROSCOPIC POLYANGIITIS: Primary | ICD-10-CM

## 2023-07-17 PROBLEM — N25.81 SECONDARY HYPERPARATHYROIDISM OF RENAL ORIGIN: Status: ACTIVE | Noted: 2023-02-03

## 2023-07-17 PROCEDURE — 99214 PR OFFICE/OUTPT VISIT, EST, LEVL IV, 30-39 MIN: ICD-10-PCS | Mod: S$GLB,,, | Performed by: INTERNAL MEDICINE

## 2023-07-17 PROCEDURE — 3078F PR MOST RECENT DIASTOLIC BLOOD PRESSURE < 80 MM HG: ICD-10-PCS | Mod: CPTII,S$GLB,, | Performed by: INTERNAL MEDICINE

## 2023-07-17 PROCEDURE — 1159F PR MEDICATION LIST DOCUMENTED IN MEDICAL RECORD: ICD-10-PCS | Mod: CPTII,S$GLB,, | Performed by: INTERNAL MEDICINE

## 2023-07-17 PROCEDURE — 99999 PR PBB SHADOW E&M-EST. PATIENT-LVL III: CPT | Mod: PBBFAC,,, | Performed by: INTERNAL MEDICINE

## 2023-07-17 PROCEDURE — 3075F SYST BP GE 130 - 139MM HG: CPT | Mod: CPTII,S$GLB,, | Performed by: INTERNAL MEDICINE

## 2023-07-17 PROCEDURE — 1126F AMNT PAIN NOTED NONE PRSNT: CPT | Mod: CPTII,S$GLB,, | Performed by: INTERNAL MEDICINE

## 2023-07-17 PROCEDURE — 3078F DIAST BP <80 MM HG: CPT | Mod: CPTII,S$GLB,, | Performed by: INTERNAL MEDICINE

## 2023-07-17 PROCEDURE — 99999 PR PBB SHADOW E&M-EST. PATIENT-LVL III: ICD-10-PCS | Mod: PBBFAC,,, | Performed by: INTERNAL MEDICINE

## 2023-07-17 PROCEDURE — 3075F PR MOST RECENT SYSTOLIC BLOOD PRESS GE 130-139MM HG: ICD-10-PCS | Mod: CPTII,S$GLB,, | Performed by: INTERNAL MEDICINE

## 2023-07-17 PROCEDURE — 1126F PR PAIN SEVERITY QUANTIFIED, NO PAIN PRESENT: ICD-10-PCS | Mod: CPTII,S$GLB,, | Performed by: INTERNAL MEDICINE

## 2023-07-17 PROCEDURE — 99214 OFFICE O/P EST MOD 30 MIN: CPT | Mod: S$GLB,,, | Performed by: INTERNAL MEDICINE

## 2023-07-17 PROCEDURE — 1159F MED LIST DOCD IN RCRD: CPT | Mod: CPTII,S$GLB,, | Performed by: INTERNAL MEDICINE

## 2023-07-17 RX ORDER — TORSEMIDE 100 MG/1
100 TABLET ORAL DAILY
COMMUNITY

## 2023-07-17 RX ORDER — SEVELAMER CARBONATE 800 MG/1
800 TABLET, FILM COATED ORAL 3 TIMES DAILY
Status: ON HOLD | COMMUNITY
Start: 2023-06-06 | End: 2023-08-09 | Stop reason: SDUPTHER

## 2023-07-17 RX ORDER — APIXABAN 5 MG/1
TABLET, FILM COATED ORAL
Qty: 60 TABLET | Refills: 0 | Status: ON HOLD | OUTPATIENT
Start: 2023-07-17 | End: 2023-08-09 | Stop reason: SDUPTHER

## 2023-07-17 RX ORDER — PREDNISONE 10 MG/1
5 TABLET ORAL DAILY
Qty: 90 TABLET | Refills: 0 | Status: ON HOLD | OUTPATIENT
Start: 2023-07-17 | End: 2023-08-09 | Stop reason: SDUPTHER

## 2023-07-17 NOTE — PROGRESS NOTES
Subjective:       Patient ID: Kristin Goodman is a 78 y.o. female.    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   3)  CHANGES SUGGESTIVE OF FOCAL ACUTE PYELONEPHRITIS.   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 Negative for fever.   HENT:  Positive for rhinorrhea. Negative for mouth sores and trouble swallowing.         Dry mouth  Nose runs but no bleeding   Eyes:  Positive for redness.        Awakens with red eyes that improve during day   Respiratory:  Negative for cough and shortness of breath.    Cardiovascular:  Negative for chest pain.   Gastrointestinal:  Negative for abdominal pain, constipation and diarrhea.   Genitourinary:  Negative for dysuria and genital sores.   Skin:  Negative for color change and rash.    Neurological:  Negative for headaches.   Hematological:  Negative for adenopathy. Does not bruise/bleed easily.   Psychiatric/Behavioral:  Negative for sleep disturbance.        Objective:   /70   Pulse 77   Wt 56.2 kg (123 lb 14.4 oz)   BMI 23.41 kg/m²      Physical Exam      Assessment:       1. Microscopic polyangiitis with crescentic GN    2. High risk medications (not anticoagulants) long-term use            Plan:       Problem List Items Addressed This Visit          Active Problems    Microscopic polyangiitis with crescentic GN - Primary     No symptoms or lab indications of active vasculitis so will anticipate continuing q6m rituximab for now    Renal function may have improved slightly as dialyzes 2/week and has been able to d/c a diuretic    For now will continue to treat ANCA vasculitis with rituximab q6m and monitor re: lupus  May add hydroxychloroquine in future    Plan repeat lab in October (including urine)  with f/u visit then rituximab in Nov          Relevant Medications    predniSONE (DELTASONE) 10 MG tablet    High risk medications (not anticoagulants) long-term use     No interval infections on current immunosuppressive regimen

## 2023-07-17 NOTE — ASSESSMENT & PLAN NOTE
No symptoms or lab indications of active vasculitis so will anticipate continuing q6m rituximab for now    Renal function may have improved slightly as dialyzes 2/week and has been able to d/c a diuretic    For now will continue to treat ANCA vasculitis with rituximab q6m and monitor re: lupus  May add hydroxychloroquine in future    Plan repeat lab in October (including urine)  with f/u visit then rituximab in Nov

## 2023-07-18 ENCOUNTER — OFFICE VISIT (OUTPATIENT)
Dept: FAMILY MEDICINE | Facility: CLINIC | Age: 78
End: 2023-07-18
Payer: MEDICARE

## 2023-07-18 VITALS
DIASTOLIC BLOOD PRESSURE: 64 MMHG | RESPIRATION RATE: 16 BRPM | HEART RATE: 64 BPM | OXYGEN SATURATION: 98 % | WEIGHT: 123.88 LBS | HEIGHT: 61 IN | TEMPERATURE: 98 F | SYSTOLIC BLOOD PRESSURE: 138 MMHG | BODY MASS INDEX: 23.39 KG/M2

## 2023-07-18 DIAGNOSIS — N18.6 ESRD (END STAGE RENAL DISEASE) ON DIALYSIS: Primary | ICD-10-CM

## 2023-07-18 DIAGNOSIS — R73.03 PREDIABETES: ICD-10-CM

## 2023-07-18 DIAGNOSIS — M31.7 MICROSCOPIC POLYANGIITIS: ICD-10-CM

## 2023-07-18 DIAGNOSIS — I10 PRIMARY HYPERTENSION: ICD-10-CM

## 2023-07-18 DIAGNOSIS — S83.241D ACUTE MEDIAL MENISCUS TEAR OF RIGHT KNEE, SUBSEQUENT ENCOUNTER: ICD-10-CM

## 2023-07-18 DIAGNOSIS — Z99.2 ESRD (END STAGE RENAL DISEASE) ON DIALYSIS: Primary | ICD-10-CM

## 2023-07-18 DIAGNOSIS — M94.261 CHONDROMALACIA OF RIGHT KNEE: ICD-10-CM

## 2023-07-18 PROCEDURE — 1159F MED LIST DOCD IN RCRD: CPT | Mod: CPTII,S$GLB,, | Performed by: INTERNAL MEDICINE

## 2023-07-18 PROCEDURE — 1160F PR REVIEW ALL MEDS BY PRESCRIBER/CLIN PHARMACIST DOCUMENTED: ICD-10-PCS | Mod: CPTII,S$GLB,, | Performed by: INTERNAL MEDICINE

## 2023-07-18 PROCEDURE — 99999 PR PBB SHADOW E&M-EST. PATIENT-LVL IV: ICD-10-PCS | Mod: PBBFAC,,, | Performed by: INTERNAL MEDICINE

## 2023-07-18 PROCEDURE — 99214 OFFICE O/P EST MOD 30 MIN: CPT | Mod: S$GLB,,, | Performed by: INTERNAL MEDICINE

## 2023-07-18 PROCEDURE — 3078F PR MOST RECENT DIASTOLIC BLOOD PRESSURE < 80 MM HG: ICD-10-PCS | Mod: CPTII,S$GLB,, | Performed by: INTERNAL MEDICINE

## 2023-07-18 PROCEDURE — 3288F FALL RISK ASSESSMENT DOCD: CPT | Mod: CPTII,S$GLB,, | Performed by: INTERNAL MEDICINE

## 2023-07-18 PROCEDURE — 99999 PR PBB SHADOW E&M-EST. PATIENT-LVL IV: CPT | Mod: PBBFAC,,, | Performed by: INTERNAL MEDICINE

## 2023-07-18 PROCEDURE — 1160F RVW MEDS BY RX/DR IN RCRD: CPT | Mod: CPTII,S$GLB,, | Performed by: INTERNAL MEDICINE

## 2023-07-18 PROCEDURE — 1101F PR PT FALLS ASSESS DOC 0-1 FALLS W/OUT INJ PAST YR: ICD-10-PCS | Mod: CPTII,S$GLB,, | Performed by: INTERNAL MEDICINE

## 2023-07-18 PROCEDURE — 3288F PR FALLS RISK ASSESSMENT DOCUMENTED: ICD-10-PCS | Mod: CPTII,S$GLB,, | Performed by: INTERNAL MEDICINE

## 2023-07-18 PROCEDURE — 3078F DIAST BP <80 MM HG: CPT | Mod: CPTII,S$GLB,, | Performed by: INTERNAL MEDICINE

## 2023-07-18 PROCEDURE — 99214 PR OFFICE/OUTPT VISIT, EST, LEVL IV, 30-39 MIN: ICD-10-PCS | Mod: S$GLB,,, | Performed by: INTERNAL MEDICINE

## 2023-07-18 PROCEDURE — 1159F PR MEDICATION LIST DOCUMENTED IN MEDICAL RECORD: ICD-10-PCS | Mod: CPTII,S$GLB,, | Performed by: INTERNAL MEDICINE

## 2023-07-18 PROCEDURE — 1101F PT FALLS ASSESS-DOCD LE1/YR: CPT | Mod: CPTII,S$GLB,, | Performed by: INTERNAL MEDICINE

## 2023-07-18 PROCEDURE — 3075F PR MOST RECENT SYSTOLIC BLOOD PRESS GE 130-139MM HG: ICD-10-PCS | Mod: CPTII,S$GLB,, | Performed by: INTERNAL MEDICINE

## 2023-07-18 PROCEDURE — 3075F SYST BP GE 130 - 139MM HG: CPT | Mod: CPTII,S$GLB,, | Performed by: INTERNAL MEDICINE

## 2023-07-18 RX ORDER — METHOCARBAMOL 500 MG/1
500 TABLET, FILM COATED ORAL 3 TIMES DAILY PRN
Qty: 60 TABLET | Refills: 0 | Status: SHIPPED | OUTPATIENT
Start: 2023-07-18 | End: 2023-10-02 | Stop reason: SDUPTHER

## 2023-07-18 NOTE — PROGRESS NOTES
"Subjective:       Patient ID: Kristin Goodman is a pleasant 78 y.o. Black or  female patient    Chief Complaint: Follow-up (ED follow up)      Patient is a pt I saw last on  02/10/2023, see my last notes.    HPI     Patient with complicated past medical history as per list of problems below coming today for follow-up after an ED visit.    She went there on the 22nd of June.  As per notes:  " Chief complaint of swelling over her left chest wall near her dialysis catheter port that began gradually over the last 2 days, not painful.. She had a full session of HD on Tuesday without issues. She still makes some urine. Denies fever, chills, cough, congestion, chest pain, dyspnea, N/V/D, abdominal pain, dysuria, flank pain, or lower extremity edema. States she is feeling well and has no other complaints. Patient previously had her catheter exchanged in April because it was clogged. Cath was flushed with no issue.   Chest x-ray and CT chest did not reveal any acute abnormalities.  Bedside ultrasound did not reveal any drainable fluid collection.   Labs without any acute abnormalities".  In the interval:  - 07/17/2023 Dr. Palacio, Rheumatologist. See his excellent notes.  Patient here with a family member.  She reports feeling fine, she is no specific issue to report.  She denies any new issue with the cath Port.    Patient Active Problem List   Diagnosis    Hypothyroid    Primary hypertension    Mitral valve regurgitation    Chest pain    Syncope    CAREY (dyspnea on exertion)    Chronic diastolic heart failure    DONAVAN (obstructive sleep apnea)    Sleep arousal disorder    Acute medial meniscal tear, right, initial encounter    Impaired mobility    S/P meniscectomy    Atherosclerosis of renal artery    Alteration in instrumental activities of daily living (IADL)    Other specified anemias    Microscopic polyangiitis with crescentic GN    Moderate malnutrition    Dysphagia    High risk medications (not " anticoagulants) long-term use    Gastric reflux    Hematuria syndrome    Dependence on hemodialysis    Anemia due to chronic kidney disease    Dizzy spells    Acute deep vein thrombosis (DVT) of non-extremity vein - subclavian    Secondary hyperparathyroidism of renal origin          ACTIVE MEDICAL ISSUES:  Documented in Problem List     PAST MEDICAL HISTORY  Documented     PAST SURGICAL HISTORY:  Documented     SOCIAL HISTORY:  Documented     FAMILY HISTORY:  Documented     ALLERGIES AND MEDICATIONS: updated and reviewed.  Documented    Review of Systems   Constitutional:  Negative for activity change, appetite change, chills, fatigue, fever and unexpected weight change.   HENT:  Negative for congestion, ear discharge, ear pain, facial swelling, postnasal drip, rhinorrhea, sinus pressure, sinus pain, sneezing and sore throat.    Eyes: Negative.    Respiratory:  Negative for cough, chest tightness, shortness of breath and wheezing.    Cardiovascular: Negative.    Gastrointestinal:  Negative for abdominal pain, blood in stool, constipation, diarrhea, nausea and vomiting.   Genitourinary: Negative.    Allergic/Immunologic: Negative for environmental allergies.   Neurological: Negative.      Objective:      Physical Exam  Vitals and nursing note reviewed.   Constitutional:       Appearance: She is normal weight.      Comments: Frail, has a rollator.   HENT:      Right Ear: Tympanic membrane normal.      Left Ear: Tympanic membrane normal.   Eyes:      Conjunctiva/sclera: Conjunctivae normal.   Cardiovascular:      Rate and Rhythm: Normal rate and regular rhythm.      Pulses: Normal pulses.      Heart sounds: Normal heart sounds.      Comments: Cath Port calm  Pulmonary:      Effort: Pulmonary effort is normal.      Breath sounds: Normal breath sounds.   Abdominal:      General: Bowel sounds are normal.   Skin:     General: Skin is warm and dry.   Neurological:      Mental Status: She is alert.   Psychiatric:          "Mood and Affect: Mood normal.         Behavior: Behavior normal.         Thought Content: Thought content normal.         Judgment: Judgment normal.       Vitals:    07/18/23 0836   BP: 138/64   BP Location: Right arm   Patient Position: Sitting   BP Method: Large (Manual)   Pulse: 64   Resp: 16   Temp: 97.9 °F (36.6 °C)   TempSrc: Oral   SpO2: 98%   Weight: 56.2 kg (123 lb 14.4 oz)   Height: 5' 1" (1.549 m)     Body mass index is 23.41 kg/m².    RESULTS: Reviewed labs from last 12 months    Last Lab Results:     Lab Results   Component Value Date    WBC 12.50 06/22/2023    HGB 12.1 06/22/2023    HCT 38.9 06/22/2023     06/22/2023     06/22/2023    K 3.9 06/22/2023     06/22/2023    CO2 23 06/22/2023    BUN 46 (H) 06/22/2023    CREATININE 4.1 (H) 06/22/2023    CALCIUM 9.6 06/22/2023    ALBUMIN 3.7 06/22/2023    AST 31 06/22/2023    ALT 63 (H) 06/22/2023    CHOL 165 11/22/2021    TRIG 138 11/22/2021    HDL 43 11/22/2021    LDLCALC 94.4 11/22/2021    HGBA1C 4.5 12/13/2022    TSH 0.585 10/27/2022         Assessment:       1. ESRD (end stage renal disease) on dialysis    2. Microscopic polyangiitis    3. Primary hypertension    4. Prediabetes        Plan:   Kristin was seen today for follow-up.    Diagnoses and all orders for this visit:    ESRD (end stage renal disease) on dialysis    See HPI, will go to Dialysis center this AM.    Microscopic polyangiitis    See excellent notes from Rheumatologist.    Primary hypertension    BP at goal.    Prediabetes    Will monitor.   No follow-ups on file.    This note was created by combination of typed  and M-Modal dictation.  Transcription errors may be present.  If there are any questions, please contact me.  "

## 2023-07-18 NOTE — TELEPHONE ENCOUNTER
No care due was identified.  [facility] Embedded Care Due Messages. Reference number: [id number].   7/18/2023 9:12:37 AM CDT

## 2023-07-28 DIAGNOSIS — J30.9 ALLERGIC RHINITIS, UNSPECIFIED SEASONALITY, UNSPECIFIED TRIGGER: ICD-10-CM

## 2023-07-28 DIAGNOSIS — D84.9 IMMUNOSUPPRESSION: ICD-10-CM

## 2023-07-28 DIAGNOSIS — E03.9 ACQUIRED HYPOTHYROIDISM: ICD-10-CM

## 2023-07-28 RX ORDER — CARVEDILOL 12.5 MG/1
12.5 TABLET ORAL 2 TIMES DAILY
Qty: 180 TABLET | Refills: 2 | Status: ON HOLD | OUTPATIENT
Start: 2023-07-28 | End: 2023-08-09 | Stop reason: SDUPTHER

## 2023-07-28 RX ORDER — LEVOTHYROXINE SODIUM 25 UG/1
25 TABLET ORAL DAILY
Qty: 90 TABLET | Refills: 0 | Status: ON HOLD | OUTPATIENT
Start: 2023-07-28 | End: 2023-08-09 | Stop reason: SDUPTHER

## 2023-07-28 RX ORDER — FLUTICASONE PROPIONATE 50 MCG
1 SPRAY, SUSPENSION (ML) NASAL 2 TIMES DAILY
Qty: 16 G | Refills: 0 | Status: ON HOLD | OUTPATIENT
Start: 2023-07-28 | End: 2023-08-09 | Stop reason: SDUPTHER

## 2023-07-28 RX ORDER — ATOVAQUONE 750 MG/5ML
1500 SUSPENSION ORAL DAILY
Qty: 300 ML | Refills: 0 | Status: ON HOLD | OUTPATIENT
Start: 2023-07-28 | End: 2023-08-09 | Stop reason: SDUPTHER

## 2023-07-28 RX ORDER — QUETIAPINE FUMARATE 25 MG/1
25 TABLET, FILM COATED ORAL NIGHTLY
Qty: 90 TABLET | Refills: 0 | Status: ON HOLD | OUTPATIENT
Start: 2023-07-28 | End: 2023-08-09 | Stop reason: SDUPTHER

## 2023-07-28 NOTE — TELEPHONE ENCOUNTER
No care due was identified.  [facility] Embedded Care Due Messages. Reference number: [id number].   7/28/2023 3:26:22 PM CDT

## 2023-08-06 ENCOUNTER — HOSPITAL ENCOUNTER (OUTPATIENT)
Facility: HOSPITAL | Age: 78
Discharge: HOME | End: 2023-08-09
Attending: EMERGENCY MEDICINE | Admitting: EMERGENCY MEDICINE
Payer: MEDICARE

## 2023-08-06 DIAGNOSIS — E03.9 ACQUIRED HYPOTHYROIDISM: ICD-10-CM

## 2023-08-06 DIAGNOSIS — R06.09 DOE (DYSPNEA ON EXERTION): ICD-10-CM

## 2023-08-06 DIAGNOSIS — M31.7 MICROSCOPIC POLYANGIITIS: ICD-10-CM

## 2023-08-06 DIAGNOSIS — R07.9 CHEST PAIN: ICD-10-CM

## 2023-08-06 DIAGNOSIS — I10 PRIMARY HYPERTENSION: ICD-10-CM

## 2023-08-06 DIAGNOSIS — R42 LIGHTHEADEDNESS: ICD-10-CM

## 2023-08-06 DIAGNOSIS — N30.00 ACUTE CYSTITIS WITHOUT HEMATURIA: Primary | ICD-10-CM

## 2023-08-06 DIAGNOSIS — J30.9 ALLERGIC RHINITIS, UNSPECIFIED SEASONALITY, UNSPECIFIED TRIGGER: ICD-10-CM

## 2023-08-06 DIAGNOSIS — D84.9 IMMUNOSUPPRESSION: ICD-10-CM

## 2023-08-06 PROBLEM — N39.0 UTI (URINARY TRACT INFECTION): Status: ACTIVE | Noted: 2023-08-06

## 2023-08-06 PROBLEM — Z86.718 CURRENT LONG-TERM USE OF ANTICOAGULANT MEDICATION WITH HISTORY OF DEEP VENOUS THROMBOSIS (DVT): Status: ACTIVE | Noted: 2023-08-06

## 2023-08-06 PROBLEM — Z79.01 CURRENT LONG-TERM USE OF ANTICOAGULANT MEDICATION WITH HISTORY OF DEEP VENOUS THROMBOSIS (DVT): Status: ACTIVE | Noted: 2023-08-06

## 2023-08-06 LAB
ALBUMIN SERPL BCP-MCNC: 3.6 G/DL (ref 3.5–5.2)
ALP SERPL-CCNC: 69 U/L (ref 55–135)
ALT SERPL W/O P-5'-P-CCNC: 76 U/L (ref 10–44)
ANION GAP SERPL CALC-SCNC: 16 MMOL/L (ref 8–16)
AST SERPL-CCNC: 34 U/L (ref 10–40)
BACTERIA #/AREA URNS AUTO: ABNORMAL /HPF
BASOPHILS # BLD AUTO: 0.07 K/UL (ref 0–0.2)
BASOPHILS NFR BLD: 0.5 % (ref 0–1.9)
BILIRUB SERPL-MCNC: 0.3 MG/DL (ref 0.1–1)
BILIRUB UR QL STRIP: NEGATIVE
BNP SERPL-MCNC: 145 PG/ML (ref 0–99)
BUN SERPL-MCNC: 43 MG/DL (ref 8–23)
CALCIUM SERPL-MCNC: 8.9 MG/DL (ref 8.7–10.5)
CHLORIDE SERPL-SCNC: 99 MMOL/L (ref 95–110)
CLARITY UR REFRACT.AUTO: CLEAR
CO2 SERPL-SCNC: 24 MMOL/L (ref 23–29)
COLOR UR AUTO: YELLOW
CREAT SERPL-MCNC: 3.6 MG/DL (ref 0.5–1.4)
D DIMER PPP IA.FEU-MCNC: 0.26 MG/L FEU
DIFFERENTIAL METHOD: ABNORMAL
EOSINOPHIL # BLD AUTO: 0.1 K/UL (ref 0–0.5)
EOSINOPHIL NFR BLD: 0.6 % (ref 0–8)
ERYTHROCYTE [DISTWIDTH] IN BLOOD BY AUTOMATED COUNT: 16.4 % (ref 11.5–14.5)
EST. GFR  (NO RACE VARIABLE): 12.6 ML/MIN/1.73 M^2
GLUCOSE SERPL-MCNC: 99 MG/DL (ref 70–110)
GLUCOSE UR QL STRIP: NEGATIVE
HCT VFR BLD AUTO: 33 % (ref 37–48.5)
HGB BLD-MCNC: 10.6 G/DL (ref 12–16)
HGB UR QL STRIP: ABNORMAL
IMM GRANULOCYTES # BLD AUTO: 0.37 K/UL (ref 0–0.04)
IMM GRANULOCYTES NFR BLD AUTO: 2.7 % (ref 0–0.5)
KETONES UR QL STRIP: NEGATIVE
LACTATE SERPL-SCNC: 1.2 MMOL/L (ref 0.5–2.2)
LEUKOCYTE ESTERASE UR QL STRIP: ABNORMAL
LYMPHOCYTES # BLD AUTO: 3.6 K/UL (ref 1–4.8)
LYMPHOCYTES NFR BLD: 25.7 % (ref 18–48)
MAGNESIUM SERPL-MCNC: 2 MG/DL (ref 1.6–2.6)
MCH RBC QN AUTO: 28 PG (ref 27–31)
MCHC RBC AUTO-ENTMCNC: 32.1 G/DL (ref 32–36)
MCV RBC AUTO: 87 FL (ref 82–98)
MICROSCOPIC COMMENT: ABNORMAL
MONOCYTES # BLD AUTO: 1.7 K/UL (ref 0.3–1)
MONOCYTES NFR BLD: 12.2 % (ref 4–15)
NEUTROPHILS # BLD AUTO: 8.1 K/UL (ref 1.8–7.7)
NEUTROPHILS NFR BLD: 58.3 % (ref 38–73)
NITRITE UR QL STRIP: NEGATIVE
NRBC BLD-RTO: 0 /100 WBC
PH UR STRIP: 7 [PH] (ref 5–8)
PLATELET # BLD AUTO: 278 K/UL (ref 150–450)
PMV BLD AUTO: 9.4 FL (ref 9.2–12.9)
POCT GLUCOSE: 86 MG/DL (ref 70–110)
POTASSIUM SERPL-SCNC: 3.9 MMOL/L (ref 3.5–5.1)
PROT SERPL-MCNC: 6.9 G/DL (ref 6–8.4)
PROT UR QL STRIP: NEGATIVE
RBC # BLD AUTO: 3.79 M/UL (ref 4–5.4)
RBC #/AREA URNS AUTO: 2 /HPF (ref 0–4)
SARS-COV-2 RDRP RESP QL NAA+PROBE: NEGATIVE
SODIUM SERPL-SCNC: 139 MMOL/L (ref 136–145)
SP GR UR STRIP: 1 (ref 1–1.03)
TROPONIN I SERPL DL<=0.01 NG/ML-MCNC: 0.02 NG/ML (ref 0–0.03)
TROPONIN I SERPL DL<=0.01 NG/ML-MCNC: 0.02 NG/ML (ref 0–0.03)
TSH SERPL DL<=0.005 MIU/L-ACNC: 1.93 UIU/ML (ref 0.4–4)
URN SPEC COLLECT METH UR: ABNORMAL
WBC # BLD AUTO: 13.8 K/UL (ref 3.9–12.7)
WBC #/AREA URNS AUTO: 30 /HPF (ref 0–5)

## 2023-08-06 PROCEDURE — 85379 FIBRIN DEGRADATION QUANT: CPT | Performed by: EMERGENCY MEDICINE

## 2023-08-06 PROCEDURE — 99222 PR INITIAL HOSPITAL CARE,LEVL II: ICD-10-PCS | Mod: ,,, | Performed by: STUDENT IN AN ORGANIZED HEALTH CARE EDUCATION/TRAINING PROGRAM

## 2023-08-06 PROCEDURE — 99285 EMERGENCY DEPT VISIT HI MDM: CPT | Mod: 25

## 2023-08-06 PROCEDURE — 84484 ASSAY OF TROPONIN QUANT: CPT | Mod: 91 | Performed by: EMERGENCY MEDICINE

## 2023-08-06 PROCEDURE — 63600175 PHARM REV CODE 636 W HCPCS: Performed by: STUDENT IN AN ORGANIZED HEALTH CARE EDUCATION/TRAINING PROGRAM

## 2023-08-06 PROCEDURE — 25000003 PHARM REV CODE 250: Performed by: STUDENT IN AN ORGANIZED HEALTH CARE EDUCATION/TRAINING PROGRAM

## 2023-08-06 PROCEDURE — 83735 ASSAY OF MAGNESIUM: CPT | Performed by: EMERGENCY MEDICINE

## 2023-08-06 PROCEDURE — 82962 GLUCOSE BLOOD TEST: CPT

## 2023-08-06 PROCEDURE — 25000242 PHARM REV CODE 250 ALT 637 W/ HCPCS: Performed by: STUDENT IN AN ORGANIZED HEALTH CARE EDUCATION/TRAINING PROGRAM

## 2023-08-06 PROCEDURE — 80053 COMPREHEN METABOLIC PANEL: CPT | Performed by: EMERGENCY MEDICINE

## 2023-08-06 PROCEDURE — 93010 EKG 12-LEAD: ICD-10-PCS | Mod: ,,, | Performed by: INTERNAL MEDICINE

## 2023-08-06 PROCEDURE — 85025 COMPLETE CBC W/AUTO DIFF WBC: CPT | Performed by: EMERGENCY MEDICINE

## 2023-08-06 PROCEDURE — 83605 ASSAY OF LACTIC ACID: CPT | Performed by: EMERGENCY MEDICINE

## 2023-08-06 PROCEDURE — 96365 THER/PROPH/DIAG IV INF INIT: CPT

## 2023-08-06 PROCEDURE — 81001 URINALYSIS AUTO W/SCOPE: CPT | Performed by: EMERGENCY MEDICINE

## 2023-08-06 PROCEDURE — 93005 ELECTROCARDIOGRAM TRACING: CPT

## 2023-08-06 PROCEDURE — U0002 COVID-19 LAB TEST NON-CDC: HCPCS

## 2023-08-06 PROCEDURE — 84443 ASSAY THYROID STIM HORMONE: CPT | Performed by: EMERGENCY MEDICINE

## 2023-08-06 PROCEDURE — 83880 ASSAY OF NATRIURETIC PEPTIDE: CPT | Performed by: EMERGENCY MEDICINE

## 2023-08-06 PROCEDURE — G0378 HOSPITAL OBSERVATION PER HR: HCPCS

## 2023-08-06 PROCEDURE — 87186 SC STD MICRODIL/AGAR DIL: CPT | Performed by: EMERGENCY MEDICINE

## 2023-08-06 PROCEDURE — 94761 N-INVAS EAR/PLS OXIMETRY MLT: CPT

## 2023-08-06 PROCEDURE — 99222 1ST HOSP IP/OBS MODERATE 55: CPT | Mod: ,,, | Performed by: STUDENT IN AN ORGANIZED HEALTH CARE EDUCATION/TRAINING PROGRAM

## 2023-08-06 PROCEDURE — 87088 URINE BACTERIA CULTURE: CPT | Performed by: EMERGENCY MEDICINE

## 2023-08-06 PROCEDURE — 87040 BLOOD CULTURE FOR BACTERIA: CPT | Performed by: EMERGENCY MEDICINE

## 2023-08-06 PROCEDURE — 87086 URINE CULTURE/COLONY COUNT: CPT | Performed by: EMERGENCY MEDICINE

## 2023-08-06 PROCEDURE — 87077 CULTURE AEROBIC IDENTIFY: CPT | Performed by: EMERGENCY MEDICINE

## 2023-08-06 PROCEDURE — 93010 ELECTROCARDIOGRAM REPORT: CPT | Mod: ,,, | Performed by: INTERNAL MEDICINE

## 2023-08-06 RX ORDER — ACETAMINOPHEN 500 MG
1000 TABLET ORAL EVERY 8 HOURS PRN
Status: DISCONTINUED | OUTPATIENT
Start: 2023-08-06 | End: 2023-08-09 | Stop reason: HOSPADM

## 2023-08-06 RX ORDER — PROCHLORPERAZINE EDISYLATE 5 MG/ML
5 INJECTION INTRAMUSCULAR; INTRAVENOUS EVERY 6 HOURS PRN
Status: DISCONTINUED | OUTPATIENT
Start: 2023-08-06 | End: 2023-08-09 | Stop reason: HOSPADM

## 2023-08-06 RX ORDER — METHOCARBAMOL 500 MG/1
500 TABLET, FILM COATED ORAL 3 TIMES DAILY PRN
Status: DISCONTINUED | OUTPATIENT
Start: 2023-08-06 | End: 2023-08-09 | Stop reason: HOSPADM

## 2023-08-06 RX ORDER — TORSEMIDE 100 MG/1
100 TABLET ORAL DAILY
Status: DISCONTINUED | OUTPATIENT
Start: 2023-08-07 | End: 2023-08-09 | Stop reason: HOSPADM

## 2023-08-06 RX ORDER — MAG HYDROX/ALUMINUM HYD/SIMETH 200-200-20
30 SUSPENSION, ORAL (FINAL DOSE FORM) ORAL 4 TIMES DAILY PRN
Status: DISCONTINUED | OUTPATIENT
Start: 2023-08-06 | End: 2023-08-09 | Stop reason: HOSPADM

## 2023-08-06 RX ORDER — ONDANSETRON 4 MG/1
8 TABLET, ORALLY DISINTEGRATING ORAL EVERY 8 HOURS PRN
Status: DISCONTINUED | OUTPATIENT
Start: 2023-08-06 | End: 2023-08-09 | Stop reason: HOSPADM

## 2023-08-06 RX ORDER — POLYETHYLENE GLYCOL 3350 17 G/17G
17 POWDER, FOR SOLUTION ORAL DAILY PRN
Status: DISCONTINUED | OUTPATIENT
Start: 2023-08-06 | End: 2023-08-09 | Stop reason: HOSPADM

## 2023-08-06 RX ORDER — AMLODIPINE BESYLATE 10 MG/1
10 TABLET ORAL DAILY
Status: DISCONTINUED | OUTPATIENT
Start: 2023-08-07 | End: 2023-08-07

## 2023-08-06 RX ORDER — BISACODYL 10 MG
10 SUPPOSITORY, RECTAL RECTAL DAILY PRN
Status: DISCONTINUED | OUTPATIENT
Start: 2023-08-06 | End: 2023-08-09 | Stop reason: HOSPADM

## 2023-08-06 RX ORDER — ACETAMINOPHEN 325 MG/1
650 TABLET ORAL EVERY 4 HOURS PRN
Status: DISCONTINUED | OUTPATIENT
Start: 2023-08-06 | End: 2023-08-09 | Stop reason: HOSPADM

## 2023-08-06 RX ORDER — IBUPROFEN 200 MG
16 TABLET ORAL
Status: DISCONTINUED | OUTPATIENT
Start: 2023-08-06 | End: 2023-08-09 | Stop reason: HOSPADM

## 2023-08-06 RX ORDER — SIMETHICONE 80 MG
1 TABLET,CHEWABLE ORAL 4 TIMES DAILY PRN
Status: DISCONTINUED | OUTPATIENT
Start: 2023-08-06 | End: 2023-08-09 | Stop reason: HOSPADM

## 2023-08-06 RX ORDER — TALC
6 POWDER (GRAM) TOPICAL NIGHTLY PRN
Status: DISCONTINUED | OUTPATIENT
Start: 2023-08-06 | End: 2023-08-09 | Stop reason: HOSPADM

## 2023-08-06 RX ORDER — ALBUTEROL SULFATE 90 UG/1
2 AEROSOL, METERED RESPIRATORY (INHALATION) EVERY 6 HOURS PRN
Status: DISCONTINUED | OUTPATIENT
Start: 2023-08-06 | End: 2023-08-09 | Stop reason: HOSPADM

## 2023-08-06 RX ORDER — TALC
6 POWDER (GRAM) TOPICAL NIGHTLY PRN
Status: CANCELLED | OUTPATIENT
Start: 2023-08-06

## 2023-08-06 RX ORDER — SODIUM CHLORIDE 0.9 % (FLUSH) 0.9 %
5 SYRINGE (ML) INJECTION
Status: DISCONTINUED | OUTPATIENT
Start: 2023-08-06 | End: 2023-08-09 | Stop reason: HOSPADM

## 2023-08-06 RX ORDER — SEVELAMER CARBONATE 800 MG/1
800 TABLET, FILM COATED ORAL 3 TIMES DAILY
Status: DISCONTINUED | OUTPATIENT
Start: 2023-08-06 | End: 2023-08-09 | Stop reason: HOSPADM

## 2023-08-06 RX ORDER — PREDNISONE 5 MG/1
10 TABLET ORAL DAILY
Status: DISCONTINUED | OUTPATIENT
Start: 2023-08-07 | End: 2023-08-09 | Stop reason: HOSPADM

## 2023-08-06 RX ORDER — SODIUM CHLORIDE 0.9 % (FLUSH) 0.9 %
10 SYRINGE (ML) INJECTION
Status: DISCONTINUED | OUTPATIENT
Start: 2023-08-06 | End: 2023-08-09 | Stop reason: HOSPADM

## 2023-08-06 RX ORDER — GLUCAGON 1 MG
1 KIT INJECTION
Status: DISCONTINUED | OUTPATIENT
Start: 2023-08-06 | End: 2023-08-09 | Stop reason: HOSPADM

## 2023-08-06 RX ORDER — CETIRIZINE HYDROCHLORIDE 5 MG/1
5 TABLET ORAL DAILY
Status: DISCONTINUED | OUTPATIENT
Start: 2023-08-07 | End: 2023-08-09 | Stop reason: HOSPADM

## 2023-08-06 RX ORDER — CIPROFLOXACIN 500 MG/1
500 TABLET ORAL DAILY
Status: DISCONTINUED | OUTPATIENT
Start: 2023-08-06 | End: 2023-08-06

## 2023-08-06 RX ORDER — QUETIAPINE FUMARATE 25 MG/1
25 TABLET, FILM COATED ORAL NIGHTLY
Status: DISCONTINUED | OUTPATIENT
Start: 2023-08-06 | End: 2023-08-09 | Stop reason: HOSPADM

## 2023-08-06 RX ORDER — NALOXONE HCL 0.4 MG/ML
0.02 VIAL (ML) INJECTION
Status: DISCONTINUED | OUTPATIENT
Start: 2023-08-06 | End: 2023-08-09 | Stop reason: HOSPADM

## 2023-08-06 RX ORDER — FLUTICASONE PROPIONATE 50 MCG
1 SPRAY, SUSPENSION (ML) NASAL 2 TIMES DAILY
Status: DISCONTINUED | OUTPATIENT
Start: 2023-08-06 | End: 2023-08-09 | Stop reason: HOSPADM

## 2023-08-06 RX ORDER — CARVEDILOL 12.5 MG/1
12.5 TABLET ORAL 2 TIMES DAILY
Status: DISCONTINUED | OUTPATIENT
Start: 2023-08-06 | End: 2023-08-09 | Stop reason: HOSPADM

## 2023-08-06 RX ORDER — ATOVAQUONE 750 MG/5ML
1500 SUSPENSION ORAL DAILY
Status: DISCONTINUED | OUTPATIENT
Start: 2023-08-07 | End: 2023-08-09 | Stop reason: HOSPADM

## 2023-08-06 RX ORDER — SODIUM CHLORIDE 0.9 % (FLUSH) 0.9 %
10 SYRINGE (ML) INJECTION
Status: CANCELLED | OUTPATIENT
Start: 2023-08-06

## 2023-08-06 RX ORDER — HYDRALAZINE HYDROCHLORIDE 25 MG/1
50 TABLET, FILM COATED ORAL EVERY 8 HOURS
Status: DISCONTINUED | OUTPATIENT
Start: 2023-08-06 | End: 2023-08-07

## 2023-08-06 RX ORDER — INSULIN ASPART 100 [IU]/ML
0-5 INJECTION, SOLUTION INTRAVENOUS; SUBCUTANEOUS
Status: DISCONTINUED | OUTPATIENT
Start: 2023-08-06 | End: 2023-08-09 | Stop reason: HOSPADM

## 2023-08-06 RX ORDER — LEVOTHYROXINE SODIUM 25 UG/1
25 TABLET ORAL DAILY
Status: DISCONTINUED | OUTPATIENT
Start: 2023-08-07 | End: 2023-08-09 | Stop reason: HOSPADM

## 2023-08-06 RX ORDER — IBUPROFEN 200 MG
24 TABLET ORAL
Status: DISCONTINUED | OUTPATIENT
Start: 2023-08-06 | End: 2023-08-09 | Stop reason: HOSPADM

## 2023-08-06 RX ADMIN — QUETIAPINE FUMARATE 25 MG: 25 TABLET ORAL at 08:08

## 2023-08-06 RX ADMIN — FLUTICASONE PROPIONATE 50 MCG: 50 SPRAY, METERED NASAL at 09:08

## 2023-08-06 RX ADMIN — ERTAPENEM 500 MG: 1 INJECTION INTRAMUSCULAR; INTRAVENOUS at 08:08

## 2023-08-06 RX ADMIN — APIXABAN 5 MG: 5 TABLET, FILM COATED ORAL at 08:08

## 2023-08-06 RX ADMIN — SEVELAMER CARBONATE 800 MG: 800 TABLET, FILM COATED ORAL at 08:08

## 2023-08-06 RX ADMIN — CARVEDILOL 12.5 MG: 12.5 TABLET, FILM COATED ORAL at 08:08

## 2023-08-06 RX ADMIN — HYDRALAZINE HYDROCHLORIDE 50 MG: 25 TABLET, FILM COATED ORAL at 09:08
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
## 2023-08-06 NOTE — SUBJECTIVE & OBJECTIVE
Past Medical History:   Diagnosis Date    Acute blood loss anemia 10/17/2022    Acute hypoxemic respiratory failure 10/23/2022    Allergy     Anticoagulant long-term use     Back pain     Chronic diastolic heart failure 08/31/2020    Chronic diastolic heart failure 08/31/2020    Colon polyp     Disorder of kidney and ureter     Encounter for blood transfusion     H/O Bell's palsy 2006    after [place]    Helicobacter pylori (H. pylori)     HTN (hypertension)     Hypothyroid     OA (osteoarthritis)     DONAVAN (obstructive sleep apnea) 11/09/2020    Pneumonia due to other staphylococcus     Pulmonary HTN 08/31/2020    Sepsis due to pneumonia 10/17/2022    Septic shock 10/27/2022    Trouble in sleeping     Urinary incontinence        Past Surgical History:   Procedure Laterality Date    ARTHROSCOPIC CHONDROPLASTY OF KNEE JOINT Right 12/21/2021    Procedure: ARTHROSCOPY, KNEE, WITH CHONDROPLASTY;  Surgeon: Elly Sullivan MD;  Location: [facility] OR;  Service: Orthopedics;  Laterality: Right;    COLONOSCOPY N/A 9/28/2020    Procedure: COLONOSCOPY;  Surgeon: Jaylan Flynn MD;  Location: [facility];  Service: Endoscopy;  Laterality: N/A;    ESOPHAGOGASTRODUODENOSCOPY N/A 11/14/2022    Procedure: EGD (ESOPHAGOGASTRODUODENOSCOPY);  Surgeon: Asaf Hahn MD;  Location: [facility] ([address] [place] [address]);  Service: Endoscopy;  Laterality: N/A;    KNEE ARTHROSCOPY W/ MENISCECTOMY Right 12/21/2021    Procedure: ARTHROSCOPY, KNEE, WITH MENISCECTOMY;  Surgeon: Elly Sullivan MD;  Location: [facility] OR;  Service: Orthopedics;  Laterality: Right;  general, regional w catheter, adductor, josefina 50cc,     OOPHORECTOMY      SYNOVECTOMY OF KNEE Right 12/21/2021    Procedure: SYNOVECTOMY, KNEE;  Surgeon: Elly Sullivan MD;  Location: [facility] OR;  Service: Orthopedics;  Laterality: Right;    TOTAL ABDOMINAL HYSTERECTOMY      21 yrs ago       Review of patient's allergies indicates:   Allergen Reactions    Ampicillin     Peaches [peach (prunus  persica)] Other (See Comments)     Pt unable to state type of reaction. Information obtained from daughter who states she was informed of allergy from patient.    Penicillins      Other reaction(s): Hives, anaphylaxis    Sulfa (sulfonamide antibiotics) Rash and Hives       Current Facility-Administered Medications on File Prior to Encounter   Medication    celecoxib capsule 400 mg    fentaNYL 50 mcg/mL injection  mcg    LIDOcaine (PF) 10 mg/ml (1%) injection 10 mg    LIDOcaine (PF) 10 mg/ml (1%) injection 10 mg    midazolam (VERSED) 1 mg/mL injection 0.5-4 mg    ropivacaine 0.2% [facility] PainPRO Pump infusion 500 ML     Current Outpatient Medications on File Prior to Encounter   Medication Sig    albuterol (VENTOLIN HFA) 90 mcg/actuation inhaler Inhale 2 puffs into the lungs every 6 (six) hours as needed for Shortness of Breath. Rescue    amLODIPine (NORVASC) 10 MG tablet Take 1 tablet (10 mg total) by mouth once daily.    atovaquone (MEPRON) 750 mg/5 mL Susp oral liquid Take 10 mLs (1,500 mg total) by mouth once daily.    carvediloL (COREG) 12.5 MG tablet Take 1 tablet (12.5 mg total) by mouth 2 (two) times daily.    ELIQUIS 5 mg Tab Take 1 tablet by mouth twice daily    ergocalciferol, vitamin D2, (VITAMIN D ORAL) Take 0.5 mcg by mouth.    fluticasone propionate (FLONASE) 50 mcg/actuation nasal spray 1 spray (50 mcg total) by Each Nostril route 2 (two) times daily.    hydrALAZINE (APRESOLINE) 50 MG tablet Take 1 tablet (50 mg total) by mouth every 8 (eight) hours.    levocetirizine (XYZAL) 5 MG tablet Take 1 tablet (5 mg total) by mouth every evening. For sinus    levothyroxine (EUTHYROX) 25 MCG tablet Take 1 tablet (25 mcg total) by mouth once daily.    methocarbamoL (ROBAXIN) 500 MG Tab Take 1 tablet (500 mg total) by mouth 3 (three) times daily as needed (muscle spasms).    methoxy peg-epoetin beta (MIRCERA INJ) 30 mcg.    mupirocin (BACTROBAN) 2 % ointment Apply topically 2 (two) times daily.    predniSONE  (DELTASONE) 10 MG tablet Take 0.5 tablets (5 mg total) by mouth once daily.    QUEtiapine (SEROQUEL) 25 MG Tab Take 1 tablet (25 mg total) by mouth every evening.    RENVELA 800 mg Tab Take 800 mg by mouth 3 (three) times daily.    TORSEMIDE ORAL Take by mouth.     Family History       Problem Relation (Age of Onset)    Alzheimer's disease Sister    Arthritis Mother, Sister, Brother    Breast cancer Other    Cancer Sister, Brother    Diabetes Father    Early death Mother (56), Father (62), Sister (63), Brother (59)    Heart disease Sister, Brother    Hyperlipidemia Sister    Hypertension Mother, Father, Sister, Brother, Daughter    Prostate cancer Brother    Rheum arthritis Sister    Stroke Father    Vision loss Brother          Tobacco Use    Smoking status: Former     Current packs/day: 0.50     Average packs/day: 0.5 packs/day for 6.0 years (3.0 ttl pk-yrs)     Types: Cigarettes    Smokeless tobacco: Never    Tobacco comments:     Quit ~ 30 years ago   Substance and Sexual Activity    Alcohol use: No    Drug use: No    Sexual activity: Never     Partners: Male     Review of Systems   Constitutional:  Negative for chills and fever.   HENT:  Negative for trouble swallowing.    Respiratory:  Negative for shortness of breath.    Cardiovascular:  Positive for chest pain. Negative for leg swelling.   Gastrointestinal:  Positive for vomiting. Negative for abdominal pain.   Genitourinary:  Negative for difficulty urinating.   Musculoskeletal:  Positive for back pain.        Upper back   Skin:  Negative for wound.   Neurological:  Positive for weakness and light-headedness. Negative for syncope.   Psychiatric/Behavioral:  Negative for confusion.      Objective:     Vital Signs (Most Recent):  Temp: 98.1 °F (36.7 °C) (08/06/23 1432)  Pulse: 62 (08/06/23 1607)  Resp: 18 (08/06/23 1607)  BP: 136/63 (08/06/23 1607)  SpO2: 100 % (08/06/23 1607) Vital Signs (24h Range):  Temp:  [97.7 °F (36.5 °C)-98.1 °F (36.7 °C)] 98.1 °F  (36.7 °C)  Pulse:  [62-66] 62  Resp:  [16-20] 18  SpO2:  [99 %-100 %] 100 %  BP: (130-157)/(61-72) 136/63     Weight: 56.2 kg (124 lb)  Body mass index is 23.43 kg/m².     Physical Exam  Vitals and nursing note reviewed.   Constitutional:       General: She is not in acute distress.     Appearance: Normal appearance. She is not toxic-appearing.   HENT:      Head: Normocephalic and atraumatic.      Nose: Nose normal.      Mouth/Throat:      Mouth: Mucous membranes are moist.      Pharynx: Oropharynx is clear.   Eyes:      Extraocular Movements: Extraocular movements intact.      Conjunctiva/sclera: Conjunctivae normal.      Pupils: Pupils are equal, round, and reactive to light.   Cardiovascular:      Rate and Rhythm: Normal rate and regular rhythm.      Pulses: Normal pulses.      Heart sounds: Normal heart sounds.      Comments: Left chest wall HD cath without erythema or tenderness  Pulmonary:      Effort: Pulmonary effort is normal.      Breath sounds: Normal breath sounds.   Chest:      Chest wall: No tenderness.   Abdominal:      General: Abdomen is flat. Bowel sounds are normal.      Palpations: Abdomen is soft.      Tenderness: There is no abdominal tenderness.   Musculoskeletal:         General: No swelling. Normal range of motion.      Cervical back: Normal range of motion and neck supple.   Skin:     General: Skin is warm and dry.   Neurological:      General: No focal deficit present.      Mental Status: She is alert and oriented to person, place, and time.   Psychiatric:         Mood and Affect: Mood normal.         Behavior: Behavior normal.              CRANIAL NERVES     CN III, IV, VI   Pupils are equal, round, and reactive to light.       Significant Labs: All pertinent labs within the past 24 hours have been reviewed.    WBC 13  H/H 10.6/33    Ddimer neg  Trop neg x1, repeat in process      Urine dipstick shows positive for 3+ leuk, WBC's.  Micro exam: 30 WBC's per HPF.  Ucx and Bcx in  process    Significant Imaging: I have reviewed all pertinent imaging results/findings within the past 24 hours.    Imaging Results              US Upper Extremity Veins Left (Final result)  Result time 08/06/23 15:57:24      Final result by Emil Heath Jr., MD (08/06/23 15:57:24)                   Impression:      Resolution of left subclavian vein nonocclusive thrombus.  No left upper extremity central venous thrombus.    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    Resolution of right subclavian vein thrombus.    Electronically signed by resident: Ej Willoughby  Date:    08/06/2023  Time:    15:29    Electronically signed by: Emil Taylor Jr  Date:    08/06/2023  Time:    15:57               Narrative:    EXAMINATION:  US UPPER EXTREMITY VEINS LEFT    CLINICAL HISTORY:  left side neck swelling;    TECHNIQUE:  Duplex and color flow Doppler evaluation and dynamic compression was performed of the left upper extremity veins.    COMPARISON:  Ultrasound bilateral upper extremity veins 01/19/2023    FINDINGS:  Central veins: Resolution of prior noted left subclavian vein thrombus.  The internal jugular, subclavian, and axillary veins are patent and free of thrombus.    Arm veins: The brachial, basilic, and cephalic veins are patent and compressible.    Contralateral subclavian/internal jugular veins:  Prior exam with occlusive thrombus of the right internal jugular and subclavian veins.  The right internal jugular vein is not visualized this exam with small presumed collateral vessels near this level which may represent chronic thrombus of the poorly visualized right internal jugular vein.  There is resolution of right subclavian venous thrombus..    Other findings: None.                                       CT Head Without Contrast (Final  result)  Result time 08/06/23 12:18:50      Final result by Yazan Palafox DO (08/06/23 12:18:50)                   Impression:      No acute intracranial findings specifically without evidence for acute intracranial hemorrhage or sulcal effacement to suggest large territory recent infarction    Continued mild patchy hypoattenuation supratentorial white matter while nonspecific concerning for chronic ischemic change with remote left thalamic lacunar type infarct      Electronically signed by: Yazan Palafox DO  Date:    08/06/2023  Time:    12:18               Narrative:    EXAMINATION:  CT HEAD WITHOUT CONTRAST    CLINICAL HISTORY:  Mental status change, unknown cause;    TECHNIQUE:  Multiple sequential 5 mm axial images of the head without contrast.  Coronal and sagittal reformatted imaging from the axial acquisition.    COMPARISON:  CT head 01/01/2023    FINDINGS:  Continued small region of hypoattenuation within the left thalamus most compatible with remote lacunar-type infarcts.  There is no acute intracranial hemorrhage or sulcal effacement to suggest large territory recent infarction.  Ventricles stable without hydrocephalus.  No midline shift or significant mass effect.  There is ill-defined decreased attenuation supratentorial white matter while nonspecific similar to prior and suggestive for mild degree of chronic ischemic change.                                       X-Ray Chest AP Portable (Final result)  Result time 08/06/23 11:16:34      Final result by Emil Heath Jr., MD (08/06/23 11:16:34)                   Impression:      No acute cardiopulmonary disease      Electronically signed by: Emil Taylor Jr  Date:    08/06/2023  Time:    11:16               Narrative:    EXAMINATION:  XR CHEST AP PORTABLE    CLINICAL HISTORY:  fatigue;    TECHNIQUE:  Single frontal view of the chest was performed.    COMPARISON:  06/22/2023    FINDINGS:  Cardiomediastinal silhouettenormal in size.  Left  jugular tunnel dialysis catheter tip extends well into the right atrium, similar to prior.    Lungs grossly clear within limitations of portable technique    Stable elevation of the right hemidiaphragm.

## 2023-08-06 NOTE — ASSESSMENT & PLAN NOTE
- home regimen amlodipine, coreg, hydralazine and torsemide  - adjust regimen as needed  - orthostatic BP ordered

## 2023-08-06 NOTE — ASSESSMENT & PLAN NOTE
ECHO 1/23:    Normal systolic function.   The estimated ejection fraction is 60%.   Grade II left ventricular diastolic dysfunction.   Small circumferential pericardial effusion.   Moderate to severe left atrial enlargement.   Mild mitral regurgitation.   Mild to moderate tricuspid regurgitation.   Normal right ventricular size with normal right ventricular systolic function.   The quantitatively derived ejection fraction is 56%.   Normal central venous pressure (3 mmHg).   The estimated PA systolic pressure is 36 mmHg.   No obvious vegitations.    - cont home meds  - , cont home diuretic   - no shortness of breath, stable on RA, and no CXR pulm edema, no pedal edema on exam

## 2023-08-06 NOTE — ASSESSMENT & PLAN NOTE
- episode of chest/upper back pain not associated with exertion, SOB, N/V, or fever. Resolved on admission   - ED with concern for possible PE but Ddimer neg, resolution of clot on u/s, compliant with eliquis, no abnormality on CXR or respiratory sxs, no tachycardia- low suspicion for PE and given Ddimer neg essentially a rule out test for PE, VQ scan not necessary unless develops new sxs including tachycardia or hypoxia- monitor  - Trop neg x1, EKG NSR and no ST changes  - repeat trop pending  - PRN EKG chest pain  - monitor sxs

## 2023-08-06 NOTE — ASSESSMENT & PLAN NOTE
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

## 2023-08-06 NOTE — ASSESSMENT & PLAN NOTE
- denies urinary sxs, elevated WBC, afebrile   - prior Ucx with ESBL Ecoli 1/23 previously on ertapenem  - UA with 30 WBC, 3+ Leuk  - Ucx in process, Bcx NGTD   - will start ertapenem pending Ucx, ID consulted for erta approval

## 2023-08-06 NOTE — ASSESSMENT & PLAN NOTE
- denies urinary sxs, elevated WBC, afebrile   - prior Ucx with ESBL Ecoli 1/23 previously on ertapenem  - UA with 30 WBC, 3+ Leuk  - Ucx in process  - will start ertapenem pending Ucx, ID consulted for erta approval

## 2023-08-06 NOTE — PROGRESS NOTES
Pharmacist Renal Dose Adjustment Note    Kristin Goodman is a 78 y.o. female being treated with the medication ciprofloxacin    Patient Data:    Vital Signs (Most Recent):  Temp: 98.1 °F (36.7 °C) (08/06/23 1432)  Pulse: 62 (08/06/23 1607)  Resp: 18 (08/06/23 1607)  BP: 136/63 (08/06/23 1607)  SpO2: 100 % (08/06/23 1756) Vital Signs (72h Range):  Temp:  [97.7 °F (36.5 °C)-98.1 °F (36.7 °C)]   Pulse:  [62-66]   Resp:  [16-20]   BP: (130-157)/(61-72)   SpO2:  [99 %-100 %]      Recent Labs   Lab 08/06/23  1324   CREATININE 3.6*     Serum creatinine: 3.6 mg/dL (H) 08/06/23 1324  Estimated creatinine clearance: 10 mL/min (A)    Medication: ciprofloxacin 500 mg BID will be changed to daily     Pharmacist's Name: Katina Teresa, PharmD, BCCCP  Pharmacist's Extension: [phone]

## 2023-08-06 NOTE — ED NOTES
Patient identifiers for Kristni Goodman 78 y.o. female checked and correct.  Chief Complaint   Patient presents with    Altered Mental Status     Daughter reports that pt was not acting right. Upon EMS arrival, pt was alert and oriented, but drowsy. Hx ESRD last dialysis 8/5/23.      Past Medical History:   Diagnosis Date    Acute blood loss anemia 10/17/2022    Acute hypoxemic respiratory failure 10/23/2022    Allergy     Anticoagulant long-term use     Back pain     Chronic diastolic heart failure 08/31/2020    Chronic diastolic heart failure 08/31/2020    Colon polyp     Disorder of kidney and ureter     Encounter for blood transfusion     H/O Bell's palsy 2006    after [place]    Helicobacter pylori (H. pylori)     HTN (hypertension)     Hypothyroid     OA (osteoarthritis)     DONAVAN (obstructive sleep apnea) 11/09/2020    Pneumonia due to other staphylococcus     Pulmonary HTN 08/31/2020    Sepsis due to pneumonia 10/17/2022    Septic shock 10/27/2022    Trouble in sleeping     Urinary incontinence      Allergies reported:   Review of patient's allergies indicates:   Allergen Reactions    Ampicillin     Peaches [peach (prunus persica)] Other (See Comments)     Pt unable to state type of reaction. Information obtained from daughter who states she was informed of allergy from patient.    Penicillins      Other reaction(s): Hives, anaphylaxis    Sulfa (sulfonamide antibiotics) Rash and Hives         LOC: Patient is awake, alert, and aware of environment with an appropriate affect. Patient is oriented x 4 and speaking appropriately.  APPEARANCE: Patient resting comfortably and in no acute distress. Patient is clean and well groomed, patient's clothing is properly fastened.  HEENT: WDL  SKIN: The skin is warm and dry. Patient has normal skin turgor and moist mucus membranes. HD permacath noted to left upper chest   MUSKULOSKELETAL: Patient is moving all extremities well, no obvious deformities noted. Pulses  intact.   RESPIRATORY: Airway is open and patent. Respirations are spontaneous and non-labored with normal effort and rate.  CARDIAC: Patient has a normal rate and rhythm. 65 on cardiac monitor. No peripheral edema noted. Pt endorses chest pain upon exertion prior to going to [employer] this morning.   ABDOMEN: No distention noted. Soft and non-tender upon palpation.  NEUROLOGICAL: pupils 3mm, PERRL. Facial expression is symmetrical. Hand grasps are equal bilaterally. Normal sensation in all extremities when touched with finger.
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
## 2023-08-06 NOTE — ASSESSMENT & PLAN NOTE
- patient with episode of lightheadedness and emesis after sitting outside waiting for transport in the heat, denies syncope, resolved when went inside and cooled off, ambulating in ED without lightheadedness  - reports sxs after HD  - orthostatic BP ordered, will adjust BP regimen as needed

## 2023-08-06 NOTE — HPI
79 yo female dialysis patient (only Tuesdays and Saturdays) with history of hypothyroid presents with fatigue, global weakness since last night. Pt had an episode of lightheadedness today when she went to stand up from a seated positing while outside.This follow by an episode of vomiting. Pt also had some left shoulder/upper back pain last night. Pt has had similar symptoms like this before in the past immediately following dialysis when she thinks they took off too much fluid. She had a full dialysis session yesterday.  Denies trouble urinating. No sob, abdominal pain, cough, headache, changes in medications. Has a runny nose.     Daughter gives further history:  Chest/upper back pain started last night lasted ~15 min and resolved, not associated with exertion, N/V or lightheadedness. Resolved overnight and no further episodes today.   Patient was outside in the heat awaiting transport to [facility] today and when transport arrive stood up and got lightheaded and vomited. This resolved shortly after sitting down and going inside to cool off.   At baseline walks with walker. Does report getting lightheaded after HD sessions, last HD 8/5 prior to admission.   History of clot associated with HD line infection and has been compliant with her eliquis. She denies shortness of breath.

## 2023-08-06 NOTE — ASSESSMENT & PLAN NOTE
- u/s with resolution of left subclavian vein nonocclusive thrombus.  No left upper extremity central venous thrombus.   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 Resolution of right subclavian vein thrombus.  - cont home eliquis

## 2023-08-07 LAB
ALBUMIN SERPL BCP-MCNC: 3.1 G/DL (ref 3.5–5.2)
ALP SERPL-CCNC: 63 U/L (ref 55–135)
ALT SERPL W/O P-5'-P-CCNC: 56 U/L (ref 10–44)
ANION GAP SERPL CALC-SCNC: 13 MMOL/L (ref 8–16)
AST SERPL-CCNC: 28 U/L (ref 10–40)
BASOPHILS # BLD AUTO: 0.08 K/UL (ref 0–0.2)
BASOPHILS NFR BLD: 0.7 % (ref 0–1.9)
BILIRUB SERPL-MCNC: 0.3 MG/DL (ref 0.1–1)
BUN SERPL-MCNC: 41 MG/DL (ref 8–23)
CALCIUM SERPL-MCNC: 8.7 MG/DL (ref 8.7–10.5)
CHLORIDE SERPL-SCNC: 103 MMOL/L (ref 95–110)
CO2 SERPL-SCNC: 22 MMOL/L (ref 23–29)
CREAT SERPL-MCNC: 3.4 MG/DL (ref 0.5–1.4)
DIFFERENTIAL METHOD: ABNORMAL
EOSINOPHIL # BLD AUTO: 0.1 K/UL (ref 0–0.5)
EOSINOPHIL NFR BLD: 0.9 % (ref 0–8)
ERYTHROCYTE [DISTWIDTH] IN BLOOD BY AUTOMATED COUNT: 16.8 % (ref 11.5–14.5)
EST. GFR  (NO RACE VARIABLE): 13.4 ML/MIN/1.73 M^2
GLUCOSE SERPL-MCNC: 71 MG/DL (ref 70–110)
HCT VFR BLD AUTO: 35.7 % (ref 37–48.5)
HGB BLD-MCNC: 11.7 G/DL (ref 12–16)
IMM GRANULOCYTES # BLD AUTO: 0.28 K/UL (ref 0–0.04)
IMM GRANULOCYTES NFR BLD AUTO: 2.5 % (ref 0–0.5)
LYMPHOCYTES # BLD AUTO: 3.4 K/UL (ref 1–4.8)
LYMPHOCYTES NFR BLD: 30.2 % (ref 18–48)
MCH RBC QN AUTO: 28.7 PG (ref 27–31)
MCHC RBC AUTO-ENTMCNC: 32.8 G/DL (ref 32–36)
MCV RBC AUTO: 88 FL (ref 82–98)
MONOCYTES # BLD AUTO: 1.3 K/UL (ref 0.3–1)
MONOCYTES NFR BLD: 11.9 % (ref 4–15)
NEUTROPHILS # BLD AUTO: 6 K/UL (ref 1.8–7.7)
NEUTROPHILS NFR BLD: 53.8 % (ref 38–73)
NRBC BLD-RTO: 0 /100 WBC
PHOSPHATE SERPL-MCNC: 3.9 MG/DL (ref 2.7–4.5)
PLATELET # BLD AUTO: 251 K/UL (ref 150–450)
PMV BLD AUTO: 9.5 FL (ref 9.2–12.9)
POCT GLUCOSE: 129 MG/DL (ref 70–110)
POCT GLUCOSE: 98 MG/DL (ref 70–110)
POTASSIUM SERPL-SCNC: 3.9 MMOL/L (ref 3.5–5.1)
PROT SERPL-MCNC: 6 G/DL (ref 6–8.4)
RBC # BLD AUTO: 4.07 M/UL (ref 4–5.4)
SODIUM SERPL-SCNC: 138 MMOL/L (ref 136–145)
WBC # BLD AUTO: 11.22 K/UL (ref 3.9–12.7)

## 2023-08-07 PROCEDURE — 82962 GLUCOSE BLOOD TEST: CPT

## 2023-08-07 PROCEDURE — 96376 TX/PRO/DX INJ SAME DRUG ADON: CPT

## 2023-08-07 PROCEDURE — 85025 COMPLETE CBC W/AUTO DIFF WBC: CPT | Performed by: STUDENT IN AN ORGANIZED HEALTH CARE EDUCATION/TRAINING PROGRAM

## 2023-08-07 PROCEDURE — 63600175 PHARM REV CODE 636 W HCPCS: Performed by: STUDENT IN AN ORGANIZED HEALTH CARE EDUCATION/TRAINING PROGRAM

## 2023-08-07 PROCEDURE — 99232 SBSQ HOSP IP/OBS MODERATE 35: CPT | Mod: ,,, | Performed by: STUDENT IN AN ORGANIZED HEALTH CARE EDUCATION/TRAINING PROGRAM

## 2023-08-07 PROCEDURE — G0378 HOSPITAL OBSERVATION PER HR: HCPCS

## 2023-08-07 PROCEDURE — 84100 ASSAY OF PHOSPHORUS: CPT | Performed by: STUDENT IN AN ORGANIZED HEALTH CARE EDUCATION/TRAINING PROGRAM

## 2023-08-07 PROCEDURE — 99232 PR SUBSEQUENT HOSPITAL CARE,LEVL II: ICD-10-PCS | Mod: ,,, | Performed by: STUDENT IN AN ORGANIZED HEALTH CARE EDUCATION/TRAINING PROGRAM

## 2023-08-07 PROCEDURE — 80053 COMPREHEN METABOLIC PANEL: CPT | Performed by: STUDENT IN AN ORGANIZED HEALTH CARE EDUCATION/TRAINING PROGRAM

## 2023-08-07 PROCEDURE — 25000003 PHARM REV CODE 250: Performed by: STUDENT IN AN ORGANIZED HEALTH CARE EDUCATION/TRAINING PROGRAM

## 2023-08-07 RX ORDER — HYDRALAZINE HYDROCHLORIDE 25 MG/1
25 TABLET, FILM COATED ORAL EVERY 8 HOURS
Status: DISCONTINUED | OUTPATIENT
Start: 2023-08-07 | End: 2023-08-07

## 2023-08-07 RX ORDER — AMLODIPINE BESYLATE 5 MG/1
5 TABLET ORAL DAILY
Status: DISCONTINUED | OUTPATIENT
Start: 2023-08-08 | End: 2023-08-09 | Stop reason: HOSPADM

## 2023-08-07 RX ADMIN — APIXABAN 5 MG: 5 TABLET, FILM COATED ORAL at 09:08

## 2023-08-07 RX ADMIN — FLUTICASONE PROPIONATE 50 MCG: 50 SPRAY, METERED NASAL at 09:08

## 2023-08-07 RX ADMIN — CETIRIZINE HYDROCHLORIDE 5 MG: 5 TABLET, FILM COATED ORAL at 09:08

## 2023-08-07 RX ADMIN — ERTAPENEM 500 MG: 1 INJECTION INTRAMUSCULAR; INTRAVENOUS at 09:08

## 2023-08-07 RX ADMIN — HYDRALAZINE HYDROCHLORIDE 25 MG: 25 TABLET, FILM COATED ORAL at 01:08

## 2023-08-07 RX ADMIN — LEVOTHYROXINE SODIUM 25 MCG: 25 TABLET ORAL at 09:08

## 2023-08-07 RX ADMIN — ATOVAQUONE 1500 MG: 750 SUSPENSION ORAL at 09:08

## 2023-08-07 RX ADMIN — SEVELAMER CARBONATE 800 MG: 800 TABLET, FILM COATED ORAL at 09:08

## 2023-08-07 RX ADMIN — PREDNISONE 10 MG: 5 TABLET ORAL at 09:08

## 2023-08-07 NOTE — ASSESSMENT & PLAN NOTE
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

## 2023-08-07 NOTE — ASSESSMENT & PLAN NOTE
- home regimen amlodipine, coreg, hydralazine and torsemide  - adjust regimen as needed  - orthostatic BP +  - stopped hydralazine and decreased amlodipine as BP <130

## 2023-08-07 NOTE — PROGRESS NOTES
Pharmacist Renal Dose Adjustment Note    Kristin Goodman is a 78 y.o. female being treated with the medication ertapenem    Patient Data:    Vital Signs (Most Recent):  Temp: 98.1 °F (36.7 °C) (08/06/23 1432)  Pulse: 62 (08/06/23 1607)  Resp: 18 (08/06/23 1607)  BP: 136/63 (08/06/23 1607)  SpO2: 100 % (08/06/23 1756) Vital Signs (72h Range):  Temp:  [97.7 °F (36.5 °C)-98.1 °F (36.7 °C)]   Pulse:  [62-66]   Resp:  [16-20]   BP: (130-157)/(61-72)   SpO2:  [99 %-100 %]      Recent Labs   Lab 08/06/23  1324   CREATININE 3.6*     Serum creatinine: 3.6 mg/dL (H) 08/06/23 1324  Estimated creatinine clearance: 10 mL/min (A)    Medication:Ertapenem 1 g Q24H to 500 mg Q24H AD    Pharmacist's Name: Lidia Lemus, PharmD  Pharmacist's Extension: [phone]

## 2023-08-07 NOTE — ED NOTES
Received pt AAO and in NAD.  Pt breathing E/U on room air.  Call bell in reach with bed rails up x2.  Pt placed on continuous cardiac monitoring.  Pt and family advised of plan of care to include all test and procedures. Patient stable at this time; will continue with plan of care.

## 2023-08-07 NOTE — ED NOTES
Care assumed at this time. Pt is A&Ox4, observed resting in no acute distress. V/S stable, call bell within reach, bed in low locked position. Daughter at bedside

## 2023-08-07 NOTE — PHARMACY MED REC
"Admission Medication History     The home medication history was taken by Carlos Sullivan.    You may go to "Admission" then "Reconcile Home Medications" tabs to review and/or act upon these items.     The home medication list has been updated by the Pharmacy department.   Please read ALL comments highlighted in yellow.   Please address this information as you see fit.    Feel free to contact us if you have any questions or require assistance.      The medications listed below were removed from the home medication list. Please reorder if appropriate:  Patient reports no longer taking the following medication(s):  ERGOCALCIFEROL D2 ORAL  MIRCERA INJECTION  MUPIROCIN 2 % OINTMENT    Current Outpatient Medications on File Prior to Encounter   Medication Sig    albuterol (VENTOLIN HFA) 90 mcg/actuation inhaler   Inhale 2 puffs into the lungs every 6 (six) hours as needed for Shortness of Breath. Rescue    amLODIPine (NORVASC) 10 MG tablet   Take 1 tablet (10 mg total) by mouth once daily.    atovaquone (MEPRON) 750 mg/5 mL Susp oral liquid   Take 10 mLs (1,500 mg total) by mouth once daily.    carvediloL (COREG) 12.5 MG tablet   Take 1 tablet (12.5 mg total) by mouth 2 (two) times daily.    ELIQUIS 5 mg Tab   Take 1 tablet by mouth twice daily    fluticasone propionate (FLONASE) 50 mcg/actuation nasal spray   1 spray (50 mcg total) by Each Nostril route 2 (two) times daily.    hydrALAZINE (APRESOLINE) 50 MG tablet   Take 50 mg by mouth every evening.    levothyroxine (EUTHYROX) 25 MCG tablet   Take 1 tablet (25 mcg total) by mouth once daily.    methocarbamoL (ROBAXIN) 500 MG Tab   Take 1 tablet (500 mg total) by mouth 3 (three) times daily as needed (muscle spasms).    predniSONE (DELTASONE) 10 MG tablet   Take 0.5 tablets (5 mg total) by mouth once daily.    QUEtiapine (SEROQUEL) 25 MG Tab   Take 1 tablet (25 mg total) by mouth every evening.    RENVELA 800 mg Tab   Take 800 mg by mouth 3 (three) times daily.    " torsemide (DEMADEX) 100 MG Tab   Take 100 mg by mouth once daily.    levocetirizine (XYZAL) 5 MG tablet   Take 1 tablet (5 mg total) by mouth every evening. For sinus (Patient not taking: Reported on 8/6/2023)     Potential issues to be addressed PRIOR TO DISCHARGE  Please discuss with the patient barriers to adherence with medication treatment plans    Carlos Sullivan  EXT [phone]                  .

## 2023-08-07 NOTE — HOSPITAL COURSE
Admitted for chest/upper back pain, light headedness after being outside in the heat. Resolved and cardiac workup neg, trop neg x2. Ambulating without lightheadedness. History of Ecoli ESBL started on erta. Bcx neg.   Ucx Ecoli ESBL. Cont erta pending ID recs for abx duration and dosing with HD.  ID recommended stopping erta as pt s/p 4 days therapy and asxs.   Nephrology consulted for HD, tolerated without issues. Cont home HD and followup with nephrology and rheumatology and previously scheduled with providers.   Stable for discharge.   Adjusted BP regimen for discharge.

## 2023-08-07 NOTE — ASSESSMENT & PLAN NOTE
- patient with episode of lightheadedness and emesis after sitting outside waiting for transport in the heat, denies syncope, resolved when went inside and cooled off, ambulating in ED without lightheadedness  - reports sxs after HD  - orthostatic BP +, adjusted BP regimen  - no further lightheadedness, ambulating without issue

## 2023-08-07 NOTE — PROGRESS NOTES
[facility] - [facility]  [facility] Medicine  Progress Note    Patient Name: Kristin Goodman  MRN: [id number]  Patient Class: OP- Observation   Admission Date: 8/6/2023  Length of Stay: 0 days  Attending Physician: Kitty Dave MD  Primary Care Provider: Lori Hernandez MD        Subjective:     Principal Problem:Chest pain        HPI:  79 yo female dialysis patient (only Tuesdays and Saturdays) with history of hypothyroid presents with fatigue, global weakness since last night. Pt had an episode of lightheadedness today when she went to stand up from a seated positing while outside.This follow by an episode of vomiting. Pt also had some left shoulder/upper back pain last night. Pt has had similar symptoms like this before in the past immediately following dialysis when she thinks they took off too much fluid. She had a full dialysis session yesterday.  Denies trouble urinating. No sob, abdominal pain, cough, headache, changes in medications. Has a runny nose.     Daughter gives further history:  Chest/upper back pain started last night lasted ~15 min and resolved, not associated with exertion, N/V or lightheadedness. Resolved overnight and no further episodes today.   Patient was outside in the heat awaiting transport to [facility] today and when transport arrive stood up and got lightheaded and vomited. This resolved shortly after sitting down and going inside to cool off.   At baseline walks with walker. Does report getting lightheaded after HD sessions, last HD 8/5 prior to admission.   History of clot associated with HD line infection and has been compliant with her eliquis. She denies shortness of breath.       Overview/Hospital Course:  Admitted for chest/upper back pain, light headedness after being outside in the heat. Resolved and cardiac workup neg, trop neg x2. Ambulating without lightheadedness. UA+ with Ucx in process. History of Ecoli ESBL started on erta. Bcx neg.       Interval History: No acute  events. No further chest/back pain, trop neg x2. Ambulating without lightheadedness. Ortho statics +, adjusting BP regimen, reports getting lightheaded after HD.     Gets HD twice weekly, nephrology consulted for HD.     UA+, urine culture in process. History of Ecoli ESBL started on erta.    Review of Systems   Constitutional:  Negative for fever.   Respiratory:  Negative for shortness of breath.    Cardiovascular:  Negative for chest pain.   Genitourinary:  Negative for dysuria.   Musculoskeletal:  Negative for back pain.   Neurological:  Negative for light-headedness.     Objective:     Vital Signs (Most Recent):  Temp: 98.5 °F (36.9 °C) (08/07/23 1332)  Pulse: 78 (08/07/23 1332)  Resp: 20 (08/07/23 1332)  BP: 139/70 (08/07/23 1332)  SpO2: 100 % (08/07/23 1332) Vital Signs (24h Range):  Temp:  [98.2 °F (36.8 °C)-99.3 °F (37.4 °C)] 98.5 °F (36.9 °C)  Pulse:  [69-78] 78  Resp:  [14-20] 20  SpO2:  [98 %-100 %] 100 %  BP: (108-154)/(55-70) 139/70     Weight: 56.2 kg (124 lb)  Body mass index is 23.43 kg/m².    Intake/Output Summary (Last 24 hours) at 8/7/2023 1614  Last data filed at 8/6/2023 2105  Gross per 24 hour   Intake 100 ml   Output --   Net 100 ml         Physical Exam  Vitals and nursing note reviewed.   Constitutional:       General: She is not in acute distress.     Appearance: Normal appearance. She is not toxic-appearing.   HENT:      Head: Normocephalic and atraumatic.      Nose: Nose normal.      Mouth/Throat:      Mouth: Mucous membranes are moist.      Pharynx: Oropharynx is clear.   Eyes:      Extraocular Movements: Extraocular movements intact.      Conjunctiva/sclera: Conjunctivae normal.      Pupils: Pupils are equal, round, and reactive to light.   Cardiovascular:      Rate and Rhythm: Normal rate and regular rhythm.      Pulses: Normal pulses.      Heart sounds: Normal heart sounds.      Comments: Left chest wall HD cath without erythema or tenderness  Pulmonary:      Effort: Pulmonary effort  is normal.      Breath sounds: Normal breath sounds.   Chest:      Chest wall: No tenderness.   Abdominal:      General: Abdomen is flat. Bowel sounds are normal.      Palpations: Abdomen is soft.      Tenderness: There is no abdominal tenderness.   Musculoskeletal:         General: No swelling. Normal range of motion.      Cervical back: Normal range of motion and neck supple.   Skin:     General: Skin is warm and dry.   Neurological:      General: No focal deficit present.      Mental Status: She is alert and oriented to person, place, and time.   Psychiatric:         Mood and Affect: Mood normal.         Behavior: Behavior normal.             Significant Labs: All pertinent labs within the past 24 hours have been reviewed.    Significant Imaging: I have reviewed all pertinent imaging results/findings within the past 24 hours.      Assessment/Plan:      * Chest pain  - episode of chest/upper back pain not associated with exertion, SOB, N/V, or fever. Resolved on admission   - ED with concern for possible PE but Ddimer neg, resolution of clot on u/s, compliant with eliquis, no abnormality on CXR or respiratory sxs, no tachycardia- low suspicion for PE and given Ddimer neg essentially a rule out test for PE, VQ scan not necessary unless develops new sxs including tachycardia or hypoxia- monitor  - Trop neg x2, EKG NSR and no ST changes  - PRN EKG chest pain  - monitor sxs, no further episodes        UTI (urinary tract infection)  - denies urinary sxs, elevated WBC, afebrile   - prior Ucx with ESBL Ecoli 1/23 previously on ertapenem  - UA with 30 WBC, 3+ Leuk  - Ucx in process, Bcx NGTD   - will start ertapenem pending Ucx, ID consulted for erta approval       Current long-term use of anticoagulant medication with history of deep venous thrombosis (DVT)  - u/s with resolution of left subclavian vein nonocclusive thrombus.  No left upper extremity central venous thrombus.   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 Resolution of right subclavian vein thrombus.  - cont home eliquis       Lightheadedness  - patient with episode of lightheadedness and emesis after sitting outside waiting for transport in the heat, denies syncope, resolved when went inside and cooled off, ambulating in ED without lightheadedness  - reports sxs after HD  - orthostatic BP +, adjusted BP regimen  - no further lightheadedness, ambulating without issue      Anemia due to chronic kidney disease  - H/H stable  - transfuse <7  - cont HD per schedule      Microscopic polyangiitis with crescentic GN  - Hospitalized Oct with MPO+ MPA complicated by crescentic GN that also included mesangial IC GN in presence of MITUL 1280/DNA 1:20, as well as hemorrhagic alveolitis   s/p Solu-Medrol 1 g IV daily 10/24-10/26/22  S/p PLEX 10/26, 10/27, 10/29, 10/30, 11/1, 11/2, 11/3 (prior to Rituxan)  S/p rituximab 375mg/m2 10/27 , 11/3 , 11/10 , 11/17  S/p cyclophosphamide 750mg/kg  IV 10/27, 11/10  Required hemodialysis  rituximab 1000 mg 5/10/23    - follows with rheumatology and nephrology  - cont HD per nephrology recs 2x week, last HD 8/5  - cont home med prednisone  - nephrology consulted for HD       Chronic diastolic heart failure  ECHO 1/23:    Normal systolic function.   The estimated ejection fraction is 60%.   Grade II left ventricular diastolic dysfunction.   Small circumferential pericardial effusion.   Moderate to severe left atrial enlargement.   Mild mitral regurgitation.   Mild to moderate tricuspid regurgitation.   Normal right ventricular size with normal right ventricular systolic function.   The quantitatively derived ejection fraction is 56%.   Normal central venous pressure (3 mmHg).   The estimated PA systolic pressure is 36 mmHg.   No obvious vegitations.    - cont home meds  -  , cont home diuretic   - no shortness of breath, stable on RA, and no CXR pulm edema, no pedal edema on exam      Primary hypertension    - home regimen amlodipine, coreg, hydralazine and torsemide  - adjust regimen as needed  - orthostatic BP +  - stopped hydralazine and decreased amlodipine as BP <130    Hypothyroid  - cont home levothyroxine   - TSH normal         VTE Risk Mitigation (From admission, onward)         Ordered     apixaban tablet 5 mg  2 times daily         08/06/23 1753     IP VTE HIGH RISK PATIENT  Once         08/06/23 1753     Place sequential compression device  Until discontinued         08/06/23 1753     Reason for No Pharmacological VTE Prophylaxis  Once        Question:  Reasons:  Answer:  Already adequately anticoagulated on oral Anticoagulants    08/06/23 1753                Discharge Planning   ANTHONY:  8/9/23    Code Status: Full Code   Is the patient medically ready for discharge?:     Reason for patient still in hospital (select all that apply): Patient trending condition                     Kitty Dave MD  Department of Hospital Medicine   [facility] - [facility]

## 2023-08-07 NOTE — ASSESSMENT & PLAN NOTE
- episode of chest/upper back pain not associated with exertion, SOB, N/V, or fever. Resolved on admission   - ED with concern for possible PE but Ddimer neg, resolution of clot on u/s, compliant with eliquis, no abnormality on CXR or respiratory sxs, no tachycardia- low suspicion for PE and given Ddimer neg essentially a rule out test for PE, VQ scan not necessary unless develops new sxs including tachycardia or hypoxia- monitor  - Trop neg x2, EKG NSR and no ST changes  - PRN EKG chest pain  - monitor sxs, no further episodes

## 2023-08-08 PROBLEM — N18.6 ESRD (END STAGE RENAL DISEASE): Status: ACTIVE | Noted: 2023-08-08

## 2023-08-08 LAB
BACTERIA UR CULT: ABNORMAL
POCT GLUCOSE: 130 MG/DL (ref 70–110)

## 2023-08-08 PROCEDURE — 99214 OFFICE O/P EST MOD 30 MIN: CPT | Mod: ,,, | Performed by: NURSE PRACTITIONER

## 2023-08-08 PROCEDURE — 99232 PR SUBSEQUENT HOSPITAL CARE,LEVL II: ICD-10-PCS | Mod: ,,, | Performed by: STUDENT IN AN ORGANIZED HEALTH CARE EDUCATION/TRAINING PROGRAM

## 2023-08-08 PROCEDURE — 99232 SBSQ HOSP IP/OBS MODERATE 35: CPT | Mod: ,,, | Performed by: STUDENT IN AN ORGANIZED HEALTH CARE EDUCATION/TRAINING PROGRAM

## 2023-08-08 PROCEDURE — 99214 PR OFFICE/OUTPT VISIT, EST, LEVL IV, 30-39 MIN: ICD-10-PCS | Mod: ,,, | Performed by: NURSE PRACTITIONER

## 2023-08-08 PROCEDURE — 63600175 PHARM REV CODE 636 W HCPCS: Performed by: STUDENT IN AN ORGANIZED HEALTH CARE EDUCATION/TRAINING PROGRAM

## 2023-08-08 PROCEDURE — 25000242 PHARM REV CODE 250 ALT 637 W/ HCPCS: Performed by: STUDENT IN AN ORGANIZED HEALTH CARE EDUCATION/TRAINING PROGRAM

## 2023-08-08 PROCEDURE — G0378 HOSPITAL OBSERVATION PER HR: HCPCS

## 2023-08-08 PROCEDURE — 96366 THER/PROPH/DIAG IV INF ADDON: CPT

## 2023-08-08 PROCEDURE — 25000003 PHARM REV CODE 250: Performed by: STUDENT IN AN ORGANIZED HEALTH CARE EDUCATION/TRAINING PROGRAM

## 2023-08-08 RX ORDER — SODIUM CHLORIDE 9 MG/ML
INJECTION, SOLUTION INTRAVENOUS ONCE
Status: DISCONTINUED | OUTPATIENT
Start: 2023-08-09 | End: 2023-08-09 | Stop reason: HOSPADM

## 2023-08-08 RX ADMIN — CARVEDILOL 12.5 MG: 12.5 TABLET, FILM COATED ORAL at 08:08

## 2023-08-08 RX ADMIN — SEVELAMER CARBONATE 800 MG: 800 TABLET, FILM COATED ORAL at 03:08

## 2023-08-08 RX ADMIN — SEVELAMER CARBONATE 800 MG: 800 TABLET, FILM COATED ORAL at 08:08

## 2023-08-08 RX ADMIN — APIXABAN 5 MG: 5 TABLET, FILM COATED ORAL at 08:08

## 2023-08-08 RX ADMIN — CETIRIZINE HYDROCHLORIDE 5 MG: 5 TABLET, FILM COATED ORAL at 08:08

## 2023-08-08 RX ADMIN — FLUTICASONE PROPIONATE 50 MCG: 50 SPRAY, METERED NASAL at 08:08

## 2023-08-08 RX ADMIN — TORSEMIDE 100 MG: 100 TABLET ORAL at 09:08

## 2023-08-08 RX ADMIN — AMLODIPINE BESYLATE 5 MG: 5 TABLET ORAL at 08:08

## 2023-08-08 RX ADMIN — ATOVAQUONE 1500 MG: 750 SUSPENSION ORAL at 08:08

## 2023-08-08 RX ADMIN — QUETIAPINE FUMARATE 25 MG: 25 TABLET ORAL at 08:08

## 2023-08-08 RX ADMIN — PREDNISONE 10 MG: 5 TABLET ORAL at 08:08

## 2023-08-08 RX ADMIN — LEVOTHYROXINE SODIUM 25 MCG: 25 TABLET ORAL at 08:08

## 2023-08-08 RX ADMIN — ERTAPENEM 500 MG: 1 INJECTION INTRAMUSCULAR; INTRAVENOUS at 10:08
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
## 2023-08-08 NOTE — PLAN OF CARE
[facility] ([place] [address])  Initial Discharge Assessment       Primary Care Provider: Lori Hernandez MD    Admission Diagnosis: Chest pain [R07.9]    Admission Date: 8/6/2023  Expected Discharge Date: 8/9/2023    Transition of Care Barriers: None    Payor: [employer]ED [employer]ICARE / Plan: [employer] SECURE COMPLETE / Product Type: Medicare Advantage /     Extended Emergency Contact Information  Primary Emergency Contact: Roro Goodman  Address: [address] [place] [address]  [address]           [place] [address] [facility]  Home Phone: [phone]  Mobile Phone: [phone]  Relation: Daughter  Secondary Emergency Contact: Padma Paez  Address: [address] [place] [address] [place]  Mobile Phone: [phone]  Relation: Sister    Discharge Plan A: Home with family  Discharge Plan B: Home Health      [facility] [address] [place] [address] [address]  [address] [place] [address]  Phone: [phone] Fax: [phone]      Initial Assessment (most recent)       Adult Discharge Assessment - 08/08/23 1358          Discharge Assessment    Assessment Type Discharge Planning Assessment     Confirmed/corrected address, phone number and insurance Yes     Confirmed Demographics Correct on Facesheet     Source of Information patient     Reason For Admission chest pain     People in Home child(arthur), adult     Facility Arrived From: home     Do you expect to return to your current living situation? Yes     Do you have help at home or someone to help you manage your care at home? Yes     Who are your caregiver(s) and their phone number(s)? jus Goodman [phone]     Prior to hospitilization cognitive status: Alert/Oriented     Current cognitive status: Alert/Oriented     Walking or Climbing Stairs ambulation difficulty, requires equipment     Dressing/Bathing bathing difficulty, requires equipment     Equipment Currently Used at  Home cane, quad     Do you currently have service(s) that help you manage your care at home? No     Who is going to help you get home at discharge? daughter     How do you get to doctors appointments? family or friend will provide     Are you on dialysis? Yes     Dialysis Name and Scheduled days Tuesday, Saturday at [facility]     Do you take coumadin? No     Discharge Plan A Home with family     Discharge Plan B Home Health     DME Needed Upon Discharge  other (see comments)   TBD    Transition of Care Barriers None        Physical Activity    On average, how many days per week do you engage in moderate to strenuous exercise (like a brisk walk)? 0 days     On average, how many minutes do you engage in exercise at this level? 0 min        Financial Resource Strain    How hard is it for you to pay for the very basics like food, housing, medical care, and heating? Not very hard        Housing Stability    In the last 12 months, was there a time when you were not able to pay the mortgage or rent on time? No     In the last 12 months, was there a time when you did not have a steady place to sleep or slept in a shelter (including now)? No        Transportation Needs    In the past 12 months, has lack of transportation kept you from medical appointments or from getting medications? No     In the past 12 months, has lack of transportation kept you from meetings, work, or from getting things needed for daily living? No        Food Insecurity    Within the past 12 months, you worried that your food would run out before you got the money to buy more. Never true     Within the past 12 months, the food you bought just didn't last and you didn't have money to get more. Never true        Social Connections    In a typical week, how many times do you talk on the phone with family, friends, or neighbors? More than three times a week     How often do you get together with friends or relatives? More than three  times a week

## 2023-08-08 NOTE — ASSESSMENT & PLAN NOTE
Nephrology History  iHD Schedule: MWF  Unit/MD: [facility]/ [facility]   Duration: 3 hours   UF: 1-2  EDW: 54.8 kg   Access: [facility] TDC   Residual Renal Function: ++    Assessment:   - No emergent indication for RRT at this time, electrolytes and volume status stable.   - Plan for HD 8/9/23   - continue torsemide 100 BID daily   - continue sevelamer 800 TID with meals   - Dialysate adjusted to current labs   - Will obtain OP dialysis records  - Continue to monitor intake and output, daily weights   - Avoid nephrotoxic medication and renal dose medications to GFR  - Will continue to monitor  - Hgb goal 10-11

## 2023-08-08 NOTE — ED NOTES
Received report from CRISTINE Gomez. Assumed care of pt. At this time.  Pt resting comfortably in bed, NAD, respirations E/UL, updated on POC, wheels locked and in low position, call bell within reach, comfort positioning and restroom needs were addressed. Necessary items were placed with in reach and was advised when a reassessment would take place.     Patient identifiers for Kristin Goodman checked and correct.    LOC: The patient is awake, alert and aware of environment with an appropriate affect, the patient is A&Ox4 and speaking appropriately.    APPEARANCE: Patient resting comfortably and in no acute distress, patient is clean and well groomed, patient's clothing is properly fastened.    SKIN: The skin is warm and dry, color consistent with ethnicity, patient has normal skin turgor and moist mucus membranes, skin intact, no breakdown or bruising noted.    MUSCULOSKELETAL: Patient moving all extremities well, no obvious swelling or deformities noted.    RESPIRATORY: Airway is open and patent, respirations are spontaneous and even, patient has a normal effort and rate.    CARDIAC: Patient has a normal rate and rhythm, no periphreal edema noted, capillary refill < 3 seconds.    ABDOMEN: Soft and non tender to palpation, no distention noted. Patient denies any nausea, vomiting, diarrhea, or constipation.     NEUROLOGIC: Eyes open spontaneously, PERRL, behavior appropriate to situation, follows commands, facial expression symmetrical, bilateral hand grasp equal and even, purposeful motor response noted, normal sensation in all extremities.     HEENT: No abnormalities noted. White sclera and pupils equal round and reactive to light. Denies headache, dizziness.     : Pt voids independently, denies dysuria, hematuria, frequency.

## 2023-08-08 NOTE — HPI
79 yo female dialysis patient (only Tuesdays and Saturdays) with history of hypothyroid presents with fatigue, global weakness since last night. Pt had an episode of lightheadedness on 8/6 when she went to stand up from a seated positing while outside.This was followed by an episode of vomiting. Last HD prior to presentation was on 8/5. Electrolytes stable. Nephrology consulted for ESRD on HD.     HPI obtained via EMR and pt interview

## 2023-08-08 NOTE — ASSESSMENT & PLAN NOTE
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
## 2023-08-09 VITALS
OXYGEN SATURATION: 96 % | SYSTOLIC BLOOD PRESSURE: 118 MMHG | WEIGHT: 117.94 LBS | RESPIRATION RATE: 16 BRPM | DIASTOLIC BLOOD PRESSURE: 68 MMHG | HEIGHT: 61 IN | BODY MASS INDEX: 22.27 KG/M2 | HEART RATE: 80 BPM | TEMPERATURE: 98 F

## 2023-08-09 LAB
ALBUMIN SERPL BCP-MCNC: 3 G/DL (ref 3.5–5.2)
ALP SERPL-CCNC: 67 U/L (ref 55–135)
ALT SERPL W/O P-5'-P-CCNC: 45 U/L (ref 10–44)
ANION GAP SERPL CALC-SCNC: 14 MMOL/L (ref 8–16)
AST SERPL-CCNC: 24 U/L (ref 10–40)
BASOPHILS # BLD AUTO: 0.08 K/UL (ref 0–0.2)
BASOPHILS NFR BLD: 0.6 % (ref 0–1.9)
BILIRUB SERPL-MCNC: 0.2 MG/DL (ref 0.1–1)
BUN SERPL-MCNC: 67 MG/DL (ref 8–23)
CALCIUM SERPL-MCNC: 8.7 MG/DL (ref 8.7–10.5)
CHLORIDE SERPL-SCNC: 100 MMOL/L (ref 95–110)
CO2 SERPL-SCNC: 23 MMOL/L (ref 23–29)
CREAT SERPL-MCNC: 4.9 MG/DL (ref 0.5–1.4)
DIFFERENTIAL METHOD: ABNORMAL
EOSINOPHIL # BLD AUTO: 0.2 K/UL (ref 0–0.5)
EOSINOPHIL NFR BLD: 1.3 % (ref 0–8)
ERYTHROCYTE [DISTWIDTH] IN BLOOD BY AUTOMATED COUNT: 16.6 % (ref 11.5–14.5)
EST. GFR  (NO RACE VARIABLE): 8.7 ML/MIN/1.73 M^2
GLUCOSE SERPL-MCNC: 77 MG/DL (ref 70–110)
HBV SURFACE AB SER-ACNC: 22.04 MIU/ML
HBV SURFACE AB SER-ACNC: REACTIVE M[IU]/ML
HBV SURFACE AG SERPL QL IA: NORMAL
HCT VFR BLD AUTO: 32.2 % (ref 37–48.5)
HGB BLD-MCNC: 10.4 G/DL (ref 12–16)
IMM GRANULOCYTES # BLD AUTO: 0.32 K/UL (ref 0–0.04)
IMM GRANULOCYTES NFR BLD AUTO: 2.6 % (ref 0–0.5)
LYMPHOCYTES # BLD AUTO: 4.5 K/UL (ref 1–4.8)
LYMPHOCYTES NFR BLD: 36 % (ref 18–48)
MCH RBC QN AUTO: 28.2 PG (ref 27–31)
MCHC RBC AUTO-ENTMCNC: 32.3 G/DL (ref 32–36)
MCV RBC AUTO: 87 FL (ref 82–98)
MONOCYTES # BLD AUTO: 1.7 K/UL (ref 0.3–1)
MONOCYTES NFR BLD: 13.5 % (ref 4–15)
NEUTROPHILS # BLD AUTO: 5.8 K/UL (ref 1.8–7.7)
NEUTROPHILS NFR BLD: 46 % (ref 38–73)
NRBC BLD-RTO: 1 /100 WBC
PHOSPHATE SERPL-MCNC: 4.1 MG/DL (ref 2.7–4.5)
PLATELET # BLD AUTO: 291 K/UL (ref 150–450)
PMV BLD AUTO: 9.8 FL (ref 9.2–12.9)
POCT GLUCOSE: 106 MG/DL (ref 70–110)
POCT GLUCOSE: 49 MG/DL (ref 70–110)
POCT GLUCOSE: 65 MG/DL (ref 70–110)
POCT GLUCOSE: 79 MG/DL (ref 70–110)
POTASSIUM SERPL-SCNC: 3.6 MMOL/L (ref 3.5–5.1)
PROT SERPL-MCNC: 6 G/DL (ref 6–8.4)
RBC # BLD AUTO: 3.69 M/UL (ref 4–5.4)
SODIUM SERPL-SCNC: 137 MMOL/L (ref 136–145)
WBC # BLD AUTO: 12.48 K/UL (ref 3.9–12.7)

## 2023-08-09 PROCEDURE — 90935 PR HEMODIALYSIS, ONE EVALUATION: ICD-10-PCS | Mod: ,,, | Performed by: NURSE PRACTITIONER

## 2023-08-09 PROCEDURE — 85025 COMPLETE CBC W/AUTO DIFF WBC: CPT | Performed by: STUDENT IN AN ORGANIZED HEALTH CARE EDUCATION/TRAINING PROGRAM

## 2023-08-09 PROCEDURE — 36415 COLL VENOUS BLD VENIPUNCTURE: CPT | Performed by: STUDENT IN AN ORGANIZED HEALTH CARE EDUCATION/TRAINING PROGRAM

## 2023-08-09 PROCEDURE — 87340 HEPATITIS B SURFACE AG IA: CPT | Performed by: NURSE PRACTITIONER

## 2023-08-09 PROCEDURE — 63600175 PHARM REV CODE 636 W HCPCS: Performed by: NURSE PRACTITIONER

## 2023-08-09 PROCEDURE — G0378 HOSPITAL OBSERVATION PER HR: HCPCS

## 2023-08-09 PROCEDURE — 99214 OFFICE O/P EST MOD 30 MIN: CPT | Mod: ,,, | Performed by: INTERNAL MEDICINE

## 2023-08-09 PROCEDURE — 25000003 PHARM REV CODE 250: Performed by: STUDENT IN AN ORGANIZED HEALTH CARE EDUCATION/TRAINING PROGRAM

## 2023-08-09 PROCEDURE — 80053 COMPREHEN METABOLIC PANEL: CPT | Performed by: STUDENT IN AN ORGANIZED HEALTH CARE EDUCATION/TRAINING PROGRAM

## 2023-08-09 PROCEDURE — 84100 ASSAY OF PHOSPHORUS: CPT | Performed by: STUDENT IN AN ORGANIZED HEALTH CARE EDUCATION/TRAINING PROGRAM

## 2023-08-09 PROCEDURE — 99214 PR OFFICE/OUTPT VISIT, EST, LEVL IV, 30-39 MIN: ICD-10-PCS | Mod: ,,, | Performed by: INTERNAL MEDICINE

## 2023-08-09 PROCEDURE — 63600175 PHARM REV CODE 636 W HCPCS: Performed by: STUDENT IN AN ORGANIZED HEALTH CARE EDUCATION/TRAINING PROGRAM

## 2023-08-09 PROCEDURE — G0257 UNSCHED DIALYSIS ESRD PT HOS: HCPCS

## 2023-08-09 PROCEDURE — 90935 HEMODIALYSIS ONE EVALUATION: CPT | Mod: ,,, | Performed by: NURSE PRACTITIONER

## 2023-08-09 PROCEDURE — 86706 HEP B SURFACE ANTIBODY: CPT | Mod: 91 | Performed by: NURSE PRACTITIONER

## 2023-08-09 PROCEDURE — 99239 PR HOSPITAL DISCHARGE DAY,>30 MIN: ICD-10-PCS | Mod: ,,, | Performed by: STUDENT IN AN ORGANIZED HEALTH CARE EDUCATION/TRAINING PROGRAM

## 2023-08-09 PROCEDURE — 99239 HOSP IP/OBS DSCHRG MGMT >30: CPT | Mod: ,,, | Performed by: STUDENT IN AN ORGANIZED HEALTH CARE EDUCATION/TRAINING PROGRAM

## 2023-08-09 RX ORDER — CARVEDILOL 12.5 MG/1
12.5 TABLET ORAL 2 TIMES DAILY
Qty: 60 TABLET | Refills: 11 | Status: SHIPPED | OUTPATIENT
Start: 2023-08-09 | End: 2024-08-08

## 2023-08-09 RX ORDER — LEVOTHYROXINE SODIUM 25 UG/1
25 TABLET ORAL DAILY
Qty: 90 TABLET | Refills: 3 | Status: SHIPPED | OUTPATIENT
Start: 2023-08-09

## 2023-08-09 RX ORDER — HEPARIN SODIUM 1000 [USP'U]/ML
1000 INJECTION, SOLUTION INTRAVENOUS; SUBCUTANEOUS
Status: DISCONTINUED | OUTPATIENT
Start: 2023-08-09 | End: 2023-08-09 | Stop reason: HOSPADM

## 2023-08-09 RX ORDER — SEVELAMER CARBONATE 800 MG/1
800 TABLET, FILM COATED ORAL 3 TIMES DAILY
Qty: 30 TABLET | Refills: 11 | Status: SHIPPED | OUTPATIENT
Start: 2023-08-09

## 2023-08-09 RX ORDER — QUETIAPINE FUMARATE 25 MG/1
25 TABLET, FILM COATED ORAL NIGHTLY
Qty: 30 TABLET | Refills: 11 | Status: SHIPPED | OUTPATIENT
Start: 2023-08-09 | End: 2024-08-08

## 2023-08-09 RX ORDER — PREDNISONE 10 MG/1
10 TABLET ORAL DAILY
Qty: 90 TABLET | Refills: 3 | Status: SHIPPED | OUTPATIENT
Start: 2023-08-09 | End: 2023-10-30

## 2023-08-09 RX ORDER — LEVOCETIRIZINE DIHYDROCHLORIDE 5 MG/1
2.5 TABLET, FILM COATED ORAL NIGHTLY
Qty: 15 TABLET | Refills: 11 | Status: SHIPPED | OUTPATIENT
Start: 2023-08-09 | End: 2024-08-08

## 2023-08-09 RX ORDER — ATOVAQUONE 750 MG/5ML
1500 SUSPENSION ORAL DAILY
Qty: 300 ML | Refills: 3 | Status: SHIPPED | OUTPATIENT
Start: 2023-08-09 | End: 2024-01-23 | Stop reason: SDUPTHER

## 2023-08-09 RX ORDER — ALBUTEROL SULFATE 90 UG/1
2 AEROSOL, METERED RESPIRATORY (INHALATION) EVERY 6 HOURS PRN
Qty: 18 G | Refills: 3 | Status: SHIPPED | OUTPATIENT
Start: 2023-08-09 | End: 2024-01-23

## 2023-08-09 RX ORDER — AMLODIPINE BESYLATE 10 MG/1
5 TABLET ORAL DAILY
Qty: 60 TABLET | Refills: 3 | Status: SHIPPED | OUTPATIENT
Start: 2023-08-09 | End: 2023-10-18

## 2023-08-09 RX ORDER — FLUTICASONE PROPIONATE 50 MCG
1 SPRAY, SUSPENSION (ML) NASAL 2 TIMES DAILY
Qty: 16 G | Refills: 3 | Status: SHIPPED | OUTPATIENT
Start: 2023-08-09

## 2023-08-09 RX ADMIN — CETIRIZINE HYDROCHLORIDE 5 MG: 5 TABLET, FILM COATED ORAL at 12:08

## 2023-08-09 RX ADMIN — ATOVAQUONE 1500 MG: 750 SUSPENSION ORAL at 12:08

## 2023-08-09 RX ADMIN — FLUTICASONE PROPIONATE 50 MCG: 50 SPRAY, METERED NASAL at 12:08

## 2023-08-09 RX ADMIN — LEVOTHYROXINE SODIUM 25 MCG: 25 TABLET ORAL at 12:08

## 2023-08-09 RX ADMIN — SEVELAMER CARBONATE 800 MG: 800 TABLET, FILM COATED ORAL at 12:08

## 2023-08-09 RX ADMIN — AMLODIPINE BESYLATE 5 MG: 5 TABLET ORAL at 12:08

## 2023-08-09 RX ADMIN — PREDNISONE 10 MG: 5 TABLET ORAL at 12:08

## 2023-08-09 RX ADMIN — HEPARIN SODIUM 1000 UNITS: 1000 INJECTION, SOLUTION INTRAVENOUS; SUBCUTANEOUS at 11:08

## 2023-08-09 RX ADMIN — SEVELAMER CARBONATE 800 MG: 800 TABLET, FILM COATED ORAL at 03:08

## 2023-08-09 RX ADMIN — CARVEDILOL 12.5 MG: 12.5 TABLET, FILM COATED ORAL at 12:08

## 2023-08-09 RX ADMIN — APIXABAN 5 MG: 5 TABLET, FILM COATED ORAL at 12:08

## 2023-08-09 NOTE — SUBJECTIVE & OBJECTIVE
Interval History: No acute events. No further chest/back pain, trop neg x2. Ambulating without lightheadedness. Ortho statics +, adjusting BP regimen, reports getting lightheaded after HD.     Gets HD twice weekly, nephrology consulted for HD. HD tomorrow   Afebrile.   UA+, urine culture Ecoli ESBL currently on erta. ID consulted for recs on abx duration and dosing with HD at discharge     Review of Systems   Constitutional:  Negative for fever.   Respiratory:  Negative for shortness of breath.    Cardiovascular:  Negative for chest pain.   Genitourinary:  Negative for dysuria.   Musculoskeletal:  Negative for back pain.   Neurological:  Negative for light-headedness.     Objective:     Vital Signs (Most Recent):  Temp: 98.6 °F (37 °C) (08/08/23 1933)  Pulse: 67 (08/08/23 1933)  Resp: (!) 22 (08/08/23 1933)  BP: (!) 149/72 (08/08/23 1933)  SpO2: 99 % (08/08/23 1933) Vital Signs (24h Range):  Temp:  [97.8 °F (36.6 °C)-98.6 °F (37 °C)] 98.6 °F (37 °C)  Pulse:  [64-77] 67  Resp:  [18-22] 22  SpO2:  [96 %-99 %] 99 %  BP: (116-157)/(57-74) 149/72     Weight: 53.5 kg (117 lb 15.1 oz)  Body mass index is 22.29 kg/m².    Intake/Output Summary (Last 24 hours) at 8/8/2023 2128  Last data filed at 8/8/2023 1300  Gross per 24 hour   Intake 480 ml   Output --   Net 480 ml           Physical Exam  Vitals and nursing note reviewed.   Constitutional:       General: She is not in acute distress.     Appearance: Normal appearance. She is not toxic-appearing.   HENT:      Head: Normocephalic and atraumatic.      Nose: Nose normal.      Mouth/Throat:      Mouth: Mucous membranes are moist.      Pharynx: Oropharynx is clear.   Eyes:      Extraocular Movements: Extraocular movements intact.      Conjunctiva/sclera: Conjunctivae normal.      Pupils: Pupils are equal, round, and reactive to light.   Cardiovascular:      Rate and Rhythm: Normal rate and regular rhythm.      Pulses: Normal pulses.      Heart sounds: Normal heart sounds.       Comments: Left chest wall HD cath without erythema or tenderness  Pulmonary:      Effort: Pulmonary effort is normal.      Breath sounds: Normal breath sounds.   Chest:      Chest wall: No tenderness.   Abdominal:      General: Abdomen is flat. Bowel sounds are normal.      Palpations: Abdomen is soft.      Tenderness: There is no abdominal tenderness.   Musculoskeletal:         General: No swelling. Normal range of motion.      Cervical back: Normal range of motion and neck supple.   Skin:     General: Skin is warm and dry.   Neurological:      General: No focal deficit present.      Mental Status: She is alert and oriented to person, place, and time.   Psychiatric:         Mood and Affect: Mood normal.         Behavior: Behavior normal.             Significant Labs: All pertinent labs within the past 24 hours have been reviewed.    Significant Imaging: I have reviewed all pertinent imaging results/findings within the past 24 hours.

## 2023-08-09 NOTE — PROGRESS NOTES
08/09/23 1111   Post-Hemodialysis Assessment   Rinseback Volume (mL) 250 mL   Blood Volume Processed (Liters) 50.5 L   Dialyzer Clearance Lightly streaked   Duration of Treatment 180 minutes   Additional Fluid Intake (mL) 300 mL   Total UF (mL) 1550 mL   Net Fluid Removal 1000   Patient Response to Treatment tolerated   Post-Treatment Weight 52.3 kg (115 lb 4.8 oz)   Treatment Weight Change -1.2   Post-Hemodialysis Comments see notes     HD TX complete. Pt AAO, VSS, NAD. Net removal 1000 ml. Pt tolerated. Report given to primary nurse. Pt returned to room via stretcher escorted by transport staff.

## 2023-08-09 NOTE — ASSESSMENT & PLAN NOTE
Gracie Goodman is a 77 yo F with a PMHx of HTN, HF, ESRD, and hypothyroidism who presented to the ED with fatigue and global weakness. Urine cultures on 8/6/23 were positive for E. Coli ESBL and started on IV ertapenam.    - Urine culture positive for E. Coli ESBL  - Patient is stable and denies any UTI symptoms  - Started on IV ertapenem on 8/6, currently on day 4 of antibiotics  - Discontinue IV ertapenem

## 2023-08-09 NOTE — DISCHARGE SUMMARY
[facility] - Intensive Care ([place] [address])  [facility] Medicine  Discharge Summary      Patient Name: Kristin Goodman  MRN: [id number]  CLAU: [id number]  Patient Class: OP- Observation  Admission Date: 8/6/2023  Hospital Length of Stay: 0 days  Discharge Date and Time:  08/09/2023 6:48 PM  Attending Physician: Kitty Dave MD   Discharging Provider: Kitty Dave MD  Primary Care Provider: Lori Hernandez MD  [facility] Medicine Team: [facility]OS[facility] MED D Kitty Dave MD  Primary Care Team: [facility] HOS[facility] MED D    HPI:   77 yo female dialysis patient (only Tuesdays and Saturdays) with history of hypothyroid presents with fatigue, global weakness since last night. Pt had an episode of lightheadedness today when she went to stand up from a seated positing while outside.This follow by an episode of vomiting. Pt also had some left shoulder/upper back pain last night. Pt has had similar symptoms like this before in the past immediately following dialysis when she thinks they took off too much fluid. She had a full dialysis session yesterday.  Denies trouble urinating. No sob, abdominal pain, cough, headache, changes in medications. Has a runny nose.     Daughter gives further history:  Chest/upper back pain started last night lasted ~15 min and resolved, not associated with exertion, N/V or lightheadedness. Resolved overnight and no further episodes today.   Patient was outside in the heat awaiting transport to [place] today and when transport arrive stood up and got lightheaded and vomited. This resolved shortly after sitting down and going inside to cool off.   At baseline walks with walker. Does report getting lightheaded after HD sessions, last HD 8/5 prior to admission.   History of clot associated with HD line infection and has been compliant with her eliquis. She denies shortness of breath.       * No surgery found *      Hospital Course:   Admitted for chest/upper back pain, light headedness after being outside in the heat.  Resolved and cardiac workup neg, trop neg x2. Ambulating without lightheadedness. History of Ecoli ESBL started on erta. Bcx neg.   Ucx Ecoli ESBL. Cont erta pending ID recs for abx duration and dosing with HD.  ID recommended stopping erta as pt s/p 4 days therapy and asxs.   Nephrology consulted for HD, tolerated without issues. Cont home HD and followup with nephrology and rheumatology and previously scheduled with providers.   Stable for discharge.   Adjusted BP regimen for discharge.        Goals of Care Treatment Preferences:  Code Status: Full Code      Consults:   Consults (From admission, onward)        Status Ordering Provider     Inpatient consult to Infectious Diseases  Once        Provider:  (Not yet assigned)    Completed COCO ROLLINS     Inpatient consult to Nephrology  Once        Provider:  (Not yet assigned)    Completed COCO ROLLINS          Cardiac/Vascular  Chronic diastolic heart failure  ECHO 1/23:    Normal systolic function.   The estimated ejection fraction is 60%.   Grade II left ventricular diastolic dysfunction.   Small circumferential pericardial effusion.   Moderate to severe left atrial enlargement.   Mild mitral regurgitation.   Mild to moderate tricuspid regurgitation.   Normal right ventricular size with normal right ventricular systolic function.   The quantitatively derived ejection fraction is 56%.   Normal central venous pressure (3 mmHg).   The estimated PA systolic pressure is 36 mmHg.   No obvious vegitations.    - cont home meds  - , cont home diuretic   - no shortness of breath, stable on RA, and no CXR pulm edema, no pedal edema on exam      Chest pain  - episode of chest/upper back pain not associated with exertion, SOB, N/V, or fever. Resolved on admission   - ED with concern for possible PE but Ddimer neg, resolution of clot on u/s, compliant with eliquis, no abnormality on CXR or respiratory sxs, no tachycardia- low suspicion for PE and given  Ddimer neg essentially a rule out test for PE, VQ scan not necessary unless develops new sxs including tachycardia or hypoxia- monitor  - Trop neg x2, EKG NSR and no ST changes  - PRN EKG chest pain  - monitor sxs, no further episodes        Primary hypertension    - home regimen amlodipine, coreg, hydralazine and torsemide  - adjust regimen as needed  - orthostatic BP +  - stopped hydralazine and decreased amlodipine as BP <130    Renal/  ESRD (end stage renal disease)  - nephrology consulted  - f/u with outpt nephrologist and cont HD as previously scheduled      UTI (urinary tract infection)  - denies urinary sxs, elevated WBC, afebrile   - prior Ucx with ESBL Ecoli 1/23 previously on ertapenem  - UA with 30 WBC, 3+ Leuk  - Ucx +Ecoli ESBL, Bcx NGTD   - Cont ertapenem, ID consulted for abx recs  - ID recommend stopping erta as pt asxs and s/p 4 days treatment       Immunology/Multi System  Microscopic polyangiitis with crescentic GN  - Hospitalized Oct with MPO+ MPA complicated by crescentic GN that also included mesangial IC GN in presence of MITUL 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/DNA 1:20, as well as hemorrhagic alveolitis   s/p Solu-Medrol 1 g IV daily 10/24-10/26/22  S/p PLEX 10/26, 10/27, 10/29, 10/30, 11/1, 11/2, 11/3 (prior to Rituxan)  S/p rituximab 375mg/m2 10/27 , 11/3 , 11/10 , 11/17  S/p cyclophosphamide 750mg/kg  IV 10/27, 11/10  Required hemodialysis  rituximab 1000 mg 5/10/23    - follows with rheumatology and nephrology  - cont HD per nephrology recs 2x week, last HD 8/5  - cont home med prednisone  - nephrology consulted for HD       Hematology  Current long-term use of anticoagulant medication with history of deep venous thrombosis (DVT)  - u/s with resolution of left subclavian vein nonocclusive thrombus.  No left upper extremity central venous thrombus.   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 Resolution of right subclavian vein thrombus.  - cont home eliquis       Oncology  Anemia due to chronic kidney disease  - H/H stable  - transfuse <7  - cont HD per schedule      Endocrine  Hypothyroid  - cont home levothyroxine   - TSH normal       Other  * Lightheadedness  - patient with episode of lightheadedness and emesis after sitting outside waiting for transport in the heat, denies syncope, resolved when went inside and cooled off, ambulating in ED without lightheadedness  - reports sxs after HD  - orthostatic BP +, adjusted BP regimen  - no further lightheadedness, ambulating without issue        Final Active Diagnoses:    Diagnosis Date Noted POA    PRINCIPAL PROBLEM:  Lightheadedness [R42] 08/06/2023 Yes    ESRD (end stage renal disease) [N18.6] 08/08/2023 Yes    Current long-term use of anticoagulant medication with history of deep venous thrombosis (DVT) [Z86.718, Z79.01] 08/06/2023 Not Applicable    UTI (urinary tract infection) [N39.0] 08/06/2023 Yes    Anemia due to chronic kidney disease [N18.9, D63.1] 12/01/2022 Yes    Microscopic polyangiitis with crescentic GN [M31.7] 10/26/2022 Yes    Chronic diastolic heart failure [I50.32] 08/31/2020 Yes    Chest pain [R07.9] 04/27/2019 Yes    Primary hypertension [I10]  Yes    Hypothyroid [E03.9]  Yes      Problems Resolved During this Admission:       Discharged Condition: stable    Disposition: Home or Self Care    Follow Up:   Follow-up Information     Lori Hernandez MD. Schedule an appointment as soon as possible for a visit in 1 week(s).    Specialties: Family Medicine, Internal Medicine  Contact information:  [address] [place] [address] [phone]                       Patient Instructions:      Diet renal     Notify your health care provider if you experience any of the following:  temperature >100.4     Notify your health care provider if you experience any of the following:  severe  uncontrolled pain     Notify your health care provider if you experience any of the following:  redness, tenderness, or signs of infection (pain, swelling, redness, odor or green/yellow discharge around incision site)     Activity as tolerated       Significant Diagnostic Studies: Labs:   BMP:   Recent Labs   Lab 08/09/23  0551   GLU 77      K 3.6      CO2 23   BUN 67*   CREATININE 4.9*   CALCIUM 8.7   , CMP   Recent Labs   Lab 08/09/23  0551      K 3.6      CO2 23   GLU 77   BUN 67*   CREATININE 4.9*   CALCIUM 8.7   PROT 6.0   ALBUMIN 3.0*   BILITOT 0.2   ALKPHOS 67   AST 24   ALT 45*   ANIONGAP 14   , Troponin   Recent Labs   Lab 08/06/23  1324 08/06/23  1639   TROPONINI 0.018 0.021    and All labs within the past 24 hours have been reviewed    Ucx Ecoli ESBL s/p 4 days erta, ID recommend stopping ABX at discharge     Pending Diagnostic Studies:     None         Medications:  Reconciled Home Medications:      Medication List      CHANGE how you take these medications    amLODIPine 10 MG tablet  Commonly known as: NORVASC  Take 0.5 tablets (5 mg total) by mouth once daily.  What changed: how much to take     apixaban 5 mg Tab  Commonly known as: ELIQUIS  Take 1 tablet (5 mg total) by mouth 2 (two) times daily.  What changed: how much to take     levocetirizine 5 MG tablet  Commonly known as: XYZAL  Take 0.5 tablets (2.5 mg total) by mouth every evening. For sinus  What changed: how much to take     predniSONE 10 MG tablet  Commonly known as: DELTASONE  Take 1 tablet (10 mg total) by mouth once daily.  What changed: how much to take        CONTINUE taking these medications    albuterol 90 mcg/actuation inhaler  Commonly known as: VENTOLIN HFA  Inhale 2 puffs into the lungs every 6 (six) hours as needed for Shortness of Breath. Rescue     atovaquone 750 mg/5 mL Susp oral liquid  Commonly known as: MEPRON  Take 10 mLs (1,500 mg total) by mouth once daily.     carvediloL 12.5 MG tablet  Commonly  known as: COREG  Take 1 tablet (12.5 mg total) by mouth 2 (two) times daily.     fluticasone propionate 50 mcg/actuation nasal spray  Commonly known as: FLONASE  1 spray (50 mcg total) by Each Nostril route 2 (two) times daily.     levothyroxine 25 MCG tablet  Commonly known as: EUTHYROX  Take 1 tablet (25 mcg total) by mouth once daily.     methocarbamoL 500 MG Tab  Commonly known as: ROBAXIN  Take 1 tablet (500 mg total) by mouth 3 (three) times daily as needed (muscle spasms).     QUEtiapine 25 MG Tab  Commonly known as: SEROQUEL  Take 1 tablet (25 mg total) by mouth every evening.     RENVELA 800 mg Tab  Generic drug: sevelamer carbonate  Take 1 tablet (800 mg total) by mouth 3 (three) times daily.     torsemide 100 MG Tab  Commonly known as: DEMADEX  Take 100 mg by mouth once daily.        STOP taking these medications    hydrALAZINE 50 MG tablet  Commonly known as: APRESOLINE            Indwelling Lines/Drains at time of discharge:   Lines/Drains/Airways     Central Venous Catheter Line  Duration           Tunneled Central Line Insertion/Assessment - Double Lumen  Subclavian Left -- days         Hemodialysis Catheter 04/28/23 1254 left internal jugular 103 days                Time spent on the discharge of patient: 40 minutes         Kitty Dave MD  Department of Hospital Medicine  [facility] - Intensive Care ([place]-16)

## 2023-08-09 NOTE — ASSESSMENT & PLAN NOTE
- nephrology consulted  - f/u with outpt nephrologist and cont HD as previously scheduled     [Ambulatory and capable of all self care but unable to carry out any work activities] : Status 2- Ambulatory and capable of all self care but unable to carry out any work activities. Up and about more than 50% of waking hours [Normal] : affect appropriate [Maculopapular rash 25-50% BSA] : Skin: Maculopapular rash 25-50% BSA [< 2 mg/dl] : Liver: < 2 mg/dl [No or intermittent] : Upper GI: No or intermittent nausea, vomiting or anorexia [<500 ml/day or < 3 episodes/day] : Lower GI (stool output/day): <500 ml/day or <3 episodes/day [I] : I [No] : No [Total % ____] : Total: [unfilled]% [de-identified] : +BS; abdomen soft, non-distended, non-tender [de-identified] : Erythematous rash of bilateral upper and lower extremities, chest, back  [FreeTextEntry1] : 10/9/19 [OnsetofaGVHD] : 3/8/21

## 2023-08-09 NOTE — ASSESSMENT & PLAN NOTE
- denies urinary sxs, elevated WBC, afebrile   - prior Ucx with ESBL Ecoli 1/23 previously on ertapenem  - UA with 30 WBC, 3+ Leuk  - Ucx +Ecoli ESBL, Bcx NGTD   - Cont ertapenem, ID consulted for abx recs  - ID recommend stopping erta as pt asxs and s/p 4 days treatment

## 2023-08-09 NOTE — PROGRESS NOTES
[facility] ([address] Medicine  Progress Note    Patient Name: Kristin Goodman  MRN: [id number]  Patient Class: OP- Observation   Admission Date: 8/6/2023  Length of Stay: 0 days  Attending Physician: Kitty Dave MD  Primary Care Provider: Lori Hernandez MD        Subjective:     Principal Problem:<principal problem not specified>        HPI:  79 yo female dialysis patient (only Tuesdays and Saturdays) with history of hypothyroid presents with fatigue, global weakness since last night. Pt had an episode of lightheadedness today when she went to stand up from a seated positing while outside.This follow by an episode of vomiting. Pt also had some left shoulder/upper back pain last night. Pt has had similar symptoms like this before in the past immediately following dialysis when she thinks they took off too much fluid. She had a full dialysis session yesterday.  Denies trouble urinating. No sob, abdominal pain, cough, headache, changes in medications. Has a runny nose.     Daughter gives further history:  Chest/upper back pain started last night lasted ~15 min and resolved, not associated with exertion, N/V or lightheadedness. Resolved overnight and no further episodes today.   Patient was outside in the heat awaiting transport to [place] today and when transport arrive stood up and got lightheaded and vomited. This resolved shortly after sitting down and going inside to cool off.   At baseline walks with walker. Does report getting lightheaded after HD sessions, last HD 8/5 prior to admission.   History of clot associated with HD line infection and has been compliant with her eliquis. She denies shortness of breath.       Overview/Hospital Course:  Admitted for chest/upper back pain, light headedness after being outside in the heat. Resolved and cardiac workup neg, trop neg x2. Ambulating without lightheadedness. History of Ecoli ESBL started on erta. Bcx neg.   Ucx Ecoli ESBL. Cont  erta pending ID recs for abx duration and dosing with HD.      Interval History: No acute events. No further chest/back pain, trop neg x2. Ambulating without lightheadedness. Ortho statics +, adjusting BP regimen, reports getting lightheaded after HD.     Gets HD twice weekly, nephrology consulted for HD. HD tomorrow   Afebrile.   UA+, urine culture Ecoli ESBL currently on erta. ID consulted for recs on abx duration and dosing with HD at discharge     Review of Systems   Constitutional:  Negative for fever.   Respiratory:  Negative for shortness of breath.    Cardiovascular:  Negative for chest pain.   Genitourinary:  Negative for dysuria.   Musculoskeletal:  Negative for back pain.   Neurological:  Negative for light-headedness.     Objective:     Vital Signs (Most Recent):  Temp: 98.6 °F (37 °C) (08/08/23 1933)  Pulse: 67 (08/08/23 1933)  Resp: (!) 22 (08/08/23 1933)  BP: (!) 149/72 (08/08/23 1933)  SpO2: 99 % (08/08/23 1933) Vital Signs (24h Range):  Temp:  [97.8 °F (36.6 °C)-98.6 °F (37 °C)] 98.6 °F (37 °C)  Pulse:  [64-77] 67  Resp:  [18-22] 22  SpO2:  [96 %-99 %] 99 %  BP: (116-157)/(57-74) 149/72     Weight: 53.5 kg (117 lb 15.1 oz)  Body mass index is 22.29 kg/m².    Intake/Output Summary (Last 24 hours) at 8/8/2023 2128  Last data filed at 8/8/2023 1300  Gross per 24 hour   Intake 480 ml   Output --   Net 480 ml           Physical Exam  Vitals and nursing note reviewed.   Constitutional:       General: She is not in acute distress.     Appearance: Normal appearance. She is not toxic-appearing.   HENT:      Head: Normocephalic and atraumatic.      Nose: Nose normal.      Mouth/Throat:      Mouth: Mucous membranes are moist.      Pharynx: Oropharynx is clear.   Eyes:      Extraocular Movements: Extraocular movements intact.      Conjunctiva/sclera: Conjunctivae normal.      Pupils: Pupils are equal, round, and reactive to light.   Cardiovascular:      Rate and Rhythm: Normal rate and regular rhythm.       Pulses: Normal pulses.      Heart sounds: Normal heart sounds.      Comments: Left chest wall HD cath without erythema or tenderness  Pulmonary:      Effort: Pulmonary effort is normal.      Breath sounds: Normal breath sounds.   Chest:      Chest wall: No tenderness.   Abdominal:      General: Abdomen is flat. Bowel sounds are normal.      Palpations: Abdomen is soft.      Tenderness: There is no abdominal tenderness.   Musculoskeletal:         General: No swelling. Normal range of motion.      Cervical back: Normal range of motion and neck supple.   Skin:     General: Skin is warm and dry.   Neurological:      General: No focal deficit present.      Mental Status: She is alert and oriented to person, place, and time.   Psychiatric:         Mood and Affect: Mood normal.         Behavior: Behavior normal.             Significant Labs: All pertinent labs within the past 24 hours have been reviewed.    Significant Imaging: I have reviewed all pertinent imaging results/findings within the past 24 hours.      Assessment/Plan:      ESRD (end stage renal disease)  - nephrology consulted      UTI (urinary tract infection)  - denies urinary sxs, elevated WBC, afebrile   - prior Ucx with ESBL Ecoli 1/23 previously on ertapenem  - UA with 30 WBC, 3+ Leuk  - Ucx +Ecoli ESBL, Bcx NGTD   - Cont ertapenem, ID consulted for abx recs, duration and dosing with HD at discharge       Current long-term use of anticoagulant medication with history of deep venous thrombosis (DVT)  - u/s with resolution of left subclavian vein nonocclusive thrombus.  No left upper extremity central venous thrombus.   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 Resolution of right subclavian vein thrombus.  - cont home eliquis       Lightheadedness  - patient with episode of lightheadedness and  emesis after sitting outside waiting for transport in the heat, denies syncope, resolved when went inside and cooled off, ambulating in ED without lightheadedness  - reports sxs after HD  - orthostatic BP +, adjusted BP regimen  - no further lightheadedness, ambulating without issue      Anemia due to chronic kidney disease  - H/H stable  - transfuse <7  - cont HD per schedule      Microscopic polyangiitis with crescentic GN  - Hospitalized Oct with MPO+ MPA complicated by crescentic GN that also included mesangial IC GN in presence of MITUL 1280/DNA 1:20, as well as hemorrhagic alveolitis   s/p Solu-Medrol 1 g IV daily 10/24-10/26/22  S/p PLEX 10/26, 10/27, 10/29, 10/30, 11/1, 11/2, 11/3 (prior to Rituxan)  S/p rituximab 375mg/m2 10/27 , 11/3 , 11/10 , 11/17  S/p cyclophosphamide 750mg/kg  IV 10/27, 11/10  Required hemodialysis  rituximab 1000 mg 5/10/23    - follows with rheumatology and nephrology  - cont HD per nephrology recs 2x week, last HD 8/5  - cont home med prednisone  - nephrology consulted for HD       Chronic diastolic heart failure  ECHO 1/23:    Normal systolic function.   The estimated ejection fraction is 60%.   Grade II left ventricular diastolic dysfunction.   Small circumferential pericardial effusion.   Moderate to severe left atrial enlargement.   Mild mitral regurgitation.   Mild to moderate tricuspid regurgitation.   Normal right ventricular size with normal right ventricular systolic function.   The quantitatively derived ejection fraction is 56%.   Normal central venous pressure (3 mmHg).   The estimated PA systolic pressure is 36 mmHg.   No obvious vegitations.    - cont home meds  - , cont home diuretic   - no shortness of breath, stable on RA, and no CXR pulm edema, no pedal edema on exam      Chest pain  - episode of chest/upper back pain not associated with exertion, SOB, N/V, or fever. Resolved on admission   - ED with concern for possible PE but Ddimer neg,  resolution of clot on u/s, compliant with eliquis, no abnormality on CXR or respiratory sxs, no tachycardia- low suspicion for PE and given Ddimer neg essentially a rule out test for PE, VQ scan not necessary unless develops new sxs including tachycardia or hypoxia- monitor  - Trop neg x2, EKG NSR and no ST changes  - PRN EKG chest pain  - monitor sxs, no further episodes        Primary hypertension    - home regimen amlodipine, coreg, hydralazine and torsemide  - adjust regimen as needed  - orthostatic BP +  - stopped hydralazine and decreased amlodipine as BP <130    Hypothyroid  - cont home levothyroxine   - TSH normal         VTE Risk Mitigation (From admission, onward)         Ordered     apixaban tablet 5 mg  2 times daily         08/06/23 1753     IP VTE HIGH RISK PATIENT  Once         08/06/23 1753     Place sequential compression device  Until discontinued         08/06/23 1753     Reason for No Pharmacological VTE Prophylaxis  Once        Question:  Reasons:  Answer:  Already adequately anticoagulated on oral Anticoagulants    08/06/23 1753                Discharge Planning   ANTHONY: 8/9/2023     Code Status: Full Code   Is the patient medically ready for discharge?:     Reason for patient still in hospital (select all that apply): Patient trending condition and Consult recommendations  Discharge Plan A: Home with family                  Kitty Dave MD  Department of Hospital Medicine   [facility] - Intensive Care ([place]-16)
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
## 2023-08-09 NOTE — ASSESSMENT & PLAN NOTE
- denies urinary sxs, elevated WBC, afebrile   - prior Ucx with ESBL Ecoli 1/23 previously on ertapenem  - UA with 30 WBC, 3+ Leuk  - Ucx +Ecoli ESBL, Bcx NGTD   - Cont ertapenem, ID consulted for abx recs, duration and dosing with HD at discharge
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
## 2023-08-09 NOTE — PROGRESS NOTES
Pt arrived to PANCHO unit via stretcher. Pt able to stand on scale. Pt AAO, VSS. Pt listed as observation pt. HD started to right chest wall cath.

## 2023-08-09 NOTE — HPI
Gracie Goodman is a 79 yo F with a PMHx of HTN, HF, ESRD, and hypothyroidism who presented to the ED with fatigue and global weakness. Patient had an episode of lightheadedness when she stands up from a seated positing, followed by an episode of vomiting. Pt also has some L shoulder/upper back pain last night. Pt has had similar symptoms like this before in the past immediately following dialysis when she thinks they took off too much fluid. She had a full dialysis session 2 days ago. Denies trouble urinating. No sob, abdominal pain, cough, headache, changes in medications. Has a runny nose.     History provided by the daughter:   Chest/upper back pain, not associated with exertion, N/V or lightheadedness. Patient was outside in the heat awaiting transport stood up and got lightheaded and vomited. This resolved shortly after sitting down and going inside to cool off. At baseline walks with walker. Does report getting lightheaded after HD sessions, last HD 8/5 prior to admission. History of clot associated with HD line infection and has been compliant with her eliquis. She denies shortness of breath.     Patient's Urine cultures on 8/6/23 were positive for E. Coli ESBL and started on IV ertapenam. Patient came to the ED in January of this year with similar presentation, with the same urine culture results (E. Coli ESBL), on ertapenem.

## 2023-08-09 NOTE — ASSESSMENT & PLAN NOTE
Gracie Goodman is a 79 yo F with a PMHx of HTN, HF, ESRD, and hypothyroidism who presented to the ED with fatigue and global weakness. Urine cultures on 8/6/23 were positive for E. Coli ESBL and started on IV ertapenam.    - Urine culture positive for E. Coli ESBL  - Patient is stable and denies any UTI symptoms  - Started on IV ertapenem on 8/6, currently on day 4 of antibiotics  - Discontinue IV ertapenem  - Infectious disease team will be signing off. Contact use with any further questions.

## 2023-08-09 NOTE — PROGRESS NOTES
[facility] HEMODIALYSIS NOTE     Patient currently on hemodialysis for removal of uremic toxins and volume.     Patient seen and evaluated on hemodialysis, tolerating treatment, see HD flowsheet for vitals and assessments.    Labs have been reviewed and the dialysate bath has been adjusted.       Assessment/Plan:    -UA +Ecoli  ESBL   -Patient seen on HD, tolerating treatment well, w/o complaints   -UF goal of 1  -Renal diet, if not NPO   -Strict I/O's and daily weights  -Daily renal function panels  -Keep MAP >65 while on HD   -Hgb goal 10-11  -Continue torsemide   -Continue sevelamer 800 TID with meals   -Will continue to follow while inpatient     Selma Enciso DNP-FNP, C  Nephrology  Pager: [phone]

## 2023-08-10 NOTE — PLAN OF CARE
Problem: Adult Inpatient Plan of Care  Goal: Plan of Care Review  Outcome: Met  Goal: Patient-Specific Goal (Individualized)  Outcome: Met  Goal: Absence of Hospital-Acquired Illness or Injury  Outcome: Met  Goal: Optimal Comfort and Wellbeing  Outcome: Met  Goal: Readiness for Transition of Care  Outcome: Met     Problem: Infection  Goal: Absence of Infection Signs and Symptoms  Outcome: Met     Problem: Fall Injury Risk  Goal: Absence of Fall and Fall-Related Injury  Outcome: Met     Problem: Device-Related Complication Risk (Hemodialysis)  Goal: Safe, Effective Therapy Delivery  Outcome: Met     Problem: Hemodynamic Instability (Hemodialysis)  Goal: Effective Tissue Perfusion  Outcome: Met     Problem: Infection (Hemodialysis)  Goal: Absence of Infection Signs and Symptoms  Outcome: Met       Patient cleared for discharge by attending. PIV removed. Pt was NSR on tele. Had an episode of hypoglyecemia this afternoon post dialysis. Resolved with eating lunch. HD catheter to right chest wall c/d/I. No complaints of pain.    Daughter at bedside and attentive to patient. Discharge instructions provided to both parties, who verbalized understanding of same. [facility] called to notify RN that the insurance won't cover the ventolin inhaler but they will cover proair. Dr. Dave notified of same and gave verbal that order can be changed to proair. [facility] adjusted orders.     Patient transferred safely off of unit via wheelchair.

## 2023-08-11 LAB
BACTERIA BLD CULT: NORMAL
BACTERIA BLD CULT: NORMAL

## 2023-08-22 NOTE — CONSULTS
[facility] - [facility]  Nephrology  Consult Note    Patient Name: Kristin Goodman  MRN: [id number]  Admission Date: 1/9/2023  Hospital Length of Stay: 1 days  Attending Provider: Aye Mahmood MD   Primary Care Physician: Lori Hernandez MD  Principal Problem:Bacteremia due to Enterococcus    Inpatient consult to Nephrology  Consult performed by: Pj Paz MD  Consult ordered by: Kirby Fitzpatrick MD        Subjective:     HPI: Ms Goodman is a 77-year-old woman with hypertension, hypothyroidism, HFpEF (most recent TTE with EF 65% with G1DD), pulmonary hypertension, current ESBL E. coli UTI, osteoarthritis, chronic back pain, DONAVAN and microscopic polyangiitis complicated base on biopsy from 10/26 by renal failure, GIB and respiratory failure with history of diffuse alveolar hemorrhage s/p IV Solumedrol, PLEX x5 sessions, Rituximab x4 doses and cyclophosphamide however now  just on steroid taper (cyclophosphamide appears to be hold secondary to anemia) now iHD dependent twice weekly via tunneled HD catheter for metabolic clearance (follows with Dr. Mojica with [facility]) who presented from [facility] for TDC removal in the setting of positive blood cultures growing Enterococcus faecalis and Bacillus species from cultures obtained on 1/5 & 1/7. In addition patient with reported syncopal event and sluggish flows on HD on 1/9 (incomplete session, daughter attributes to iron infusion) with last full session of iHD being on 1/6. She reports still making good urine without any urinary complaints today. Nephrology has been consulted for inpatient HD needs.      Past Medical History:   Diagnosis Date    Acute blood loss anemia 10/17/2022    Acute hypoxemic respiratory failure 10/23/2022    Allergy     Back pain     Chronic diastolic heart failure 8/31/2020    Chronic diastolic heart failure 8/31/2020    Colon polyp     Disorder of kidney and ureter     H/O Bell's palsy 2006    after Hurricane  MATERNAL FETAL MEDICINE HAS BEEN CONSULTED AND FOLLOWING THE PATIENT FOR THE FOLLOWING ISSUE(S) DURING THIS HOSPITALIZATION     1. Placenta previa with hemorrhage in second trimester          SUBJECTIVE:     The patient reports the following events or issues over the past 24 hours no significant hemorrhage    CURRENT MEDICATIONS  Current Facility-Administered Medications   Medication Dose Route Frequency Provider Last Rate Last Admin   • diphtheria-pertussis (acellular)-tetanus (BOOSTRIX) vaccine 0.5 mL  0.5 mL Intramuscular Once Messi Vera MD       • hydroCORTisone (CORTIZONE) 1 % cream   Topical BID Damir Davis MD   Given at 08/21/23 2053   • hydroCORTisone (ANUSOL-HC) suppository 25 mg  25 mg Rectal BID PRN Damir Davis MD       • famotidine (PEPCID) tablet 20 mg  20 mg Oral 2 times per day Damir Davis MD   20 mg at 08/21/23 2049   • polyethylene glycol (MIRALAX) packet 17 g  17 g Oral Daily Damir Davis MD   17 g at 08/21/23 2049   • simethicone (MYLICON) tablet 125 mg  125 mg Oral BID Damir Davis MD   125 mg at 08/21/23 2052   • PRENATAL vitamin & mineral w/ folic acid 1 mg tablet 1 tablet  1 tablet Oral Daily Damir Davis MD   1 tablet at 08/21/23 2049   • magnesium oxide (MAG-OX) tablet 400 mg  400 mg Oral Daily Damir Davis MD   400 mg at 08/21/23 2049   • diphenhydrAMINE (BENADRYL) capsule 50 mg  50 mg Oral Q4H PRN Damir Davis MD   50 mg at 08/21/23 2049   • albuterol inhaler 2 puff  2 puff Inhalation Q4H PRN Larry Topete MD       • aluminum-magnesium hydroxide-simethicone (MAALOX) 200-200-20 MG/5ML suspension 30 mL  30 mL Oral Q4H PRN Larry Topete MD       • docusate sodium (COLACE) capsule 200 mg  200 mg Oral Nightly PRN Larry Topete MD       • acetaminophen (TYLENOL) tablet 650 mg  650 mg Oral Q4H PRN Larry Topete MD       • calcium carbonate (TUMS) chewable tablet 500 mg  500 mg Oral Q4H PRN Larry Topete MD       •  Jessica    Helicobacter pylori (H. pylori)     HTN (hypertension)     Hypothyroid     OA (osteoarthritis)     DONAVAN (obstructive sleep apnea) 11/9/2020    Pneumonia due to other staphylococcus     Pulmonary HTN 8/31/2020    Sepsis due to pneumonia 10/17/2022    Septic shock 10/27/2022    Trouble in sleeping     Urinary incontinence        Past Surgical History:   Procedure Laterality Date    ARTHROSCOPIC CHONDROPLASTY OF KNEE JOINT Right 12/21/2021    Procedure: ARTHROSCOPY, KNEE, WITH CHONDROPLASTY;  Surgeon: Elly Sullivan MD;  Location: [facility] OR;  Service: Orthopedics;  Laterality: Right;    COLONOSCOPY N/A 9/28/2020    Procedure: COLONOSCOPY;  Surgeon: Jaylan Flynn MD;  Location: [facility] ENDO;  Service: Endoscopy;  Laterality: N/A;    ESOPHAGOGASTRODUODENOSCOPY N/A 11/14/2022    Procedure: EGD (ESOPHAGOGASTRODUODENOSCOPY);  Surgeon: Asaf Hahn MD;  Location: [address]);  Service: Endoscopy;  Laterality: N/A;    KNEE ARTHROSCOPY W/ MENISCECTOMY Right 12/21/2021    Procedure: ARTHROSCOPY, KNEE, WITH MENISCECTOMY;  Surgeon: Elly Sullivan MD;  Location: [facility] OR;  Service: Orthopedics;  Laterality: Right;  general, regional w catheter, adductor, josefina 50cc,     OOPHORECTOMY      SYNOVECTOMY OF KNEE Right 12/21/2021    Procedure: SYNOVECTOMY, KNEE;  Surgeon: Elly Sullivan MD;  Location: [facility] OR;  Service: Orthopedics;  Laterality: Right;    TOTAL ABDOMINAL HYSTERECTOMY      19 yrs ago       Review of patient's allergies indicates:   Allergen Reactions    Ampicillin     Peaches [peach (prunus persica)] Other (See Comments)     Pt unable to state type of reaction. Information obtained from daughter who states she was informed of allergy from patient.    Penicillins      Other reaction(s): Hives    Sulfa (sulfonamide antibiotics) Rash and Hives     Current Facility-Administered Medications   Medication Frequency    acetaminophen tablet 650 mg Q4H PRN    albuterol-ipratropium 2.5 mg-0.5 mg/3 mL nebulizer  sodium chloride 0.9 % flush bag 25 mL  25 mL Intravenous PRN Larry Topete MD       • sodium chloride (PF) 0.9 % injection 2 mL  2 mL Intracatheter 2 times per day Larry Topete MD   5 mL at 08/21/23 2054   • lactated ringers infusion   Intravenous Continuous Larry Topete MD   Stopped at 08/17/23 0946   • hydrOXYzine (ATARAX) tablet 25 mg  25 mg Oral TID PRN Armin Sebastian MD   25 mg at 08/21/23 2049   • loratadine (CLARITIN) tablet 10 mg  10 mg Oral BID Armin Sebastian MD   10 mg at 08/21/23 2049   • ondansetron (ZOFRAN ODT) disintegrating tablet 4 mg  4 mg Oral Q12H PRN Armin Sebastian MD   4 mg at 08/20/23 2058       Allergies as of 08/16/2023 - Reviewed 08/16/2023   Allergen Reaction Noted   • Depakote Other (See Comments) 01/22/2018   • Amitriptyline Other (See Comments) 03/02/2018   • Diclofenac Other (See Comments) 03/02/2018   • Topiramate Other (See Comments) 05/19/2015   • Bupropion HEADACHES and Other (See Comments) 03/29/2023   • Omeprazole HEADACHES 09/14/2022   • Sertraline Palpitations 03/29/2023         PHYSICAL EXAMINATION:  VITAL SIGNS:    Patient Vitals for the past 24 hrs:   BP Temp Temp src Pulse Resp SpO2   08/22/23 0659 -- -- -- -- 16 --   08/22/23 0108 94/50 97 °F (36.1 °C) Temporal 80 17 95 %   08/21/23 1549 108/62 98.2 °F (36.8 °C) Temporal 81 16 98 %     Input and output for past 24 hours No intake or output data in the 24 hours ending 08/22/23 0754    Abdomen; Gravid    MOST RECENT LAB RESULTS FROM THIS VISIT:      [facility] ULTRASOUND IF PERFORMED TODAY SHOWED    ASSESSMENT AND PLAN:  A 27w4d pregnancy being followed by [facility] for     PROBLEMS.  1. Placenta previa with hemorrhage in second trimester    Plan   1. IV access and active type and screen at all times  2. Magnesium 4 g bolus for neuro protection if delivery expected within 24 hours  3. Delivery for life-threatening hemorrhage, requiring more than 2 units of PRBCs in 24 hrs to maintain Hgb of 8,  Messi Vera MD  8/22/2023  7:54 AM   solution 3 mL Q4H PRN    aluminum-magnesium hydroxide 200-200 mg/5 mL suspension 30 mL QID PRN    atovaquone 750 mg/5 mL oral liquid 1,500 mg Daily    B-complex with vitamin C tablet 1 tablet Daily    bisacodyL suppository 10 mg Daily PRN    carvediloL tablet 25 mg BID    cloNIDine tablet 0.1 mg Q8H PRN    ertapenem (INVANZ) 0.5 g in sodium chloride 0.9% 100 mL IVPB Q24H    furosemide tablet 40 mg Daily    [START ON 1/11/2023] heparin (porcine) injection 1,000 Units PRN    levothyroxine tablet 25 mcg Before breakfast    magnesium oxide tablet 400 mg Daily    meclizine tablet 25 mg TID PRN    melatonin tablet 6 mg Nightly PRN    methocarbamoL tablet 500 mg TID PRN    naloxone 0.4 mg/mL injection 0.02 mg PRN    ondansetron injection 4 mg Q8H PRN    pantoprazole EC tablet 40 mg BID AC    predniSONE tablet 10 mg with lunch    [START ON 1/15/2023] predniSONE tablet 5 mg Daily    prochlorperazine injection Soln 5 mg Q6H PRN    quetiapine split tablet 12.5 mg QHS    senna-docusate 8.6-50 mg per tablet 1 tablet BID PRN    simethicone chewable tablet 80 mg QID PRN    sodium chloride 0.9% flush 10 mL PRN    sodium chloride 0.9% flush 10 mL Q6H    And    sodium chloride 0.9% flush 10 mL PRN    sodium chloride 0.9% flush 10 mL Q6H PRN    sucralfate tablet 1 g TID PRN    vancomycin - pharmacy to dose pharmacy to manage frequency    white petrolatum 41 % ointment Daily     Current Outpatient Medications   Medication    ACETAMINOPHEN (TYLENOL ARTHRITIS PAIN ORAL)    albuterol (VENTOLIN HFA) 90 mcg/actuation inhaler    amLODIPine (NORVASC) 10 MG tablet    aspirin 325 MG tablet    diclofenac sodium (VOLTAREN) 1 % Gel    epinastine 0.05 % ophthalmic solution    EUTHYROX 25 mcg tablet    fish,bora,flax oils-om3,6,9no1 (OMEGA 3-6-9) 1,200 mg Cap    fluticasone propionate (FLONASE) 50 mcg/actuation nasal spray    FLUZONE HIGHDOSE QUAD 20-21  mcg/0.7 mL Syrg    hydrALAZINE (APRESOLINE) 100 MG tablet    HYDROcodone-acetaminophen  (NORCO) 5-325 mg per tablet    ibuprofen (ADVIL,MOTRIN) 800 MG tablet    levocetirizine (XYZAL) 5 MG tablet    lisinopriL (PRINIVIL,ZESTRIL) 40 MG tablet    meloxicam (MOBIC) 15 MG tablet    methocarbamoL (ROBAXIN) 500 MG Tab    metoprolol succinate (TOPROL-XL) 50 MG 24 hr tablet    mupirocin (BACTROBAN) 2 % ointment    tiZANidine (ZANAFLEX) 4 MG tablet     Facility-Administered Medications Ordered in Other Encounters   Medication Frequency    [MAR Hold - Suspended Admission] 0.9%  NaCl infusion (for blood administration) Q24H PRN    [MAR Hold - Suspended Admission] 0.9%  NaCl infusion PRN    [MAR Hold - Suspended Admission] 0.9%  NaCl infusion Once    [MAR Hold - Suspended Admission] acetaminophen tablet 650 mg Q4H PRN    [MAR Hold - Suspended Admission] albuterol-ipratropium 2.5 mg-0.5 mg/3 mL nebulizer solution 3 mL Q4H PRN    [MAR Hold - Suspended Admission] alteplase injection 2 mg PRN    [MAR Hold - Suspended Admission] alteplase injection 2 mg PRN    [MAR Hold - Suspended Admission] aluminum-magnesium hydroxide 200-200 mg/5 mL suspension 30 mL QID PRN    [MAR Hold - Suspended Admission] atovaquone 750 mg/5 mL oral liquid 1,500 mg Daily    [MAR Hold - Suspended Admission] B complex-vitamin C-folic acid 0.8 mg Tab 0.8 mg Daily    [MAR Hold - Suspended Admission] bisacodyL suppository 10 mg Daily PRN    [MAR Hold - Suspended Admission] carvediloL tablet 25 mg BID    celecoxib capsule 400 mg Once    [MAR Hold - Suspended Admission] cloNIDine tablet 0.1 mg Q8H PRN    [MAR Hold - Suspended Admission] dextrose 10% bolus 125 mL 125 mL PRN    [MAR Hold - Suspended Admission] dextrose 10% bolus 250 mL 250 mL PRN    [MAR Hold - Suspended Admission] epoetin hira-epbx injection 10,000 Units Every Mon, Wed, Fri    [MAR Hold - Suspended Admission] ertapenem (INVANZ) 0.5 g in sodium chloride 0.9% 100 mL IVPB Q24H    fentaNYL 50 mcg/mL injection  mcg PRN    [MAR Hold - Suspended Admission] furosemide tablet 40 mg Daily     [MAR Hold - Suspended Admission] heparin (porcine) injection 1,000 Units PRN    [MAR Hold - Suspended Admission] heparin, porcine (PF) 100 unit/mL injection flush 500 Units PRN    [MAR Hold - Suspended Admission] heparin, porcine (PF) 100 unit/mL injection flush 500 Units PRN    [MAR Hold - Suspended Admission] levothyroxine tablet 25 mcg Before breakfast    LIDOcaine (PF) 10 mg/ml (1%) injection 10 mg Once PRN    LIDOcaine (PF) 10 mg/ml (1%) injection 10 mg Once    [MAR Hold - Suspended Admission] magnesium oxide tablet 400 mg Daily    [MAR Hold - Suspended Admission] meclizine tablet 25 mg TID PRN    [MAR Hold - Suspended Admission] melatonin tablet 6 mg Nightly PRN    [MAR Hold - Suspended Admission] methocarbamoL tablet 500 mg TID PRN    [MAR Hold - Suspended Admission] metoclopramide HCl injection 5 mg Q6H PRN    midazolam (VERSED) 1 mg/mL injection 0.5-4 mg PRN    [MAR Hold - Suspended Admission] naloxone 0.4 mg/mL injection 0.02 mg PRN    [MAR Hold - Suspended Admission] ondansetron injection 4 mg Q8H PRN    [MAR Hold - Suspended Admission] pantoprazole EC tablet 40 mg BID AC    [MAR Hold - Suspended Admission] predniSONE tablet 10 mg with lunch    [MAR Hold - Suspended Admission] predniSONE tablet 5 mg Daily    [MAR Hold - Suspended Admission] prochlorperazine injection Soln 5 mg Q6H PRN    [MAR Hold - Suspended Admission] quetiapine split tablet 12.5 mg QHS    ropivacaine 0.2% [facility] Pump infusion 500 ML Continuous    [MAR Hold - Suspended Admission] senna-docusate 8.6-50 mg per tablet 1 tablet BID PRN    [MAR Hold - Suspended Admission] simethicone chewable tablet 80 mg QID PRN    [MAR Hold - Suspended Admission] sodium chloride 0.9% bolus 250 mL 250 mL PRN    [MAR Hold - Suspended Admission] sodium chloride 0.9% bolus 250 mL 250 mL PRN    [MAR Hold - Suspended Admission] sodium chloride 0.9% flush 10 mL PRN    [MAR Hold - Suspended Admission] sodium chloride 0.9% flush 10 mL PRN    [MAR Hold -  Suspended Admission] sodium chloride 0.9% flush 10 mL Q6H    And    [MAR Hold - Suspended Admission] sodium chloride 0.9% flush 10 mL PRN    [MAR Hold - Suspended Admission] sucralfate tablet 1 g TID PRN    [MAR Hold - Suspended Admission] vancomycin - pharmacy to dose pharmacy to manage frequency    [MAR Hold - Suspended Admission] white petrolatum 41 % ointment Daily     Family History       Problem Relation (Age of Onset)    Alzheimer's disease Sister    Arthritis Mother, Sister, Brother    Breast cancer Other    Cancer Sister, Brother    Diabetes Father    Early death Mother (56), Father (62), Sister (63), Brother (59)    Heart disease Sister, Brother    Hyperlipidemia Sister    Hypertension Mother, Father, Sister, Brother, Daughter    Prostate cancer Brother    Rheum arthritis Sister    Stroke Father    Vision loss Brother          Tobacco Use    Smoking status: Former     Packs/day: 0.50     Years: 6.00     Pack years: 3.00     Types: Cigarettes    Smokeless tobacco: Never    Tobacco comments:     Quit ~ 30 years ago   Substance and Sexual Activity    Alcohol use: No    Drug use: No    Sexual activity: Never     Partners: Male     Review of Systems   Constitutional:  Positive for activity change and fatigue. Negative for appetite change, chills and fever.   HENT:  Negative for congestion, sinus pain and trouble swallowing.    Eyes:  Negative for pain and redness.   Respiratory:  Negative for cough, shortness of breath and wheezing.    Cardiovascular:  Positive for leg swelling. Negative for chest pain and palpitations.        Reports improvement in leg swelling.   Gastrointestinal:  Negative for abdominal distention, abdominal pain, blood in stool, constipation, diarrhea, nausea and vomiting.   Genitourinary:  Negative for decreased urine volume, difficulty urinating, dysuria, flank pain, frequency, hematuria, urgency and vaginal pain.        Patient reports good urine output.   Musculoskeletal:  Negative for  "back pain and neck pain.   Skin:  Negative for rash and wound.   Neurological:  Positive for weakness (non-focal). Negative for dizziness, tremors, syncope, light-headedness and headaches.        Reports what sounds like vaso-vagal events versus "starring spells" on occasion with iHD. No overt LOC or seizure like activity per patient and daughter.   Psychiatric/Behavioral:  Negative for confusion and sleep disturbance.      Objective:     Vital Signs (Most Recent):  Temp: 98.3 °F (36.8 °C) (01/10/23 0422)  Pulse: 67 (01/10/23 0422)  Resp: 16 (01/10/23 0422)  BP: 137/71 (01/10/23 0422)  SpO2: 99 % (01/10/23 0422) Vital Signs (24h Range):  Temp:  [97.8 °F (36.6 °C)-99 °F (37.2 °C)] 98.3 °F (36.8 °C)  Pulse:  [60-80] 67  Resp:  [16-20] 16  SpO2:  [97 %-100 %] 99 %  BP: (110-150)/(59-77) 137/71     Weight: 57.6 kg (127 lb) (01/09/23 1624)  Body mass index is 24 kg/m².  Body surface area is 1.57 meters squared.    I/O last 3 completed shifts:  In: 120 [P.O.:120]  Out: -     Physical Exam  Vitals and nursing note reviewed.   Constitutional:       General: She is awake. She is not in acute distress.     Appearance: Normal appearance. She is overweight. She is not ill-appearing or diaphoretic.   HENT:      Head: Normocephalic and atraumatic.      Right Ear: External ear normal.      Left Ear: External ear normal.      Nose: Nose normal.      Mouth/Throat:      Mouth: Mucous membranes are moist.      Pharynx: Oropharynx is clear. No oropharyngeal exudate or posterior oropharyngeal erythema.   Eyes:      General: No scleral icterus.        Right eye: No discharge.         Left eye: No discharge.      Extraocular Movements: Extraocular movements intact.      Conjunctiva/sclera: Conjunctivae normal.      Comments: Eye glasses present.   Cardiovascular:      Rate and Rhythm: Normal rate.      Heart sounds: Murmur heard.   Systolic murmur is present with a grade of 1/6.     No friction rub. No gallop.      Comments: Bilateral " pitting lower extremity edema which extends proximally.  Pulmonary:      Effort: Pulmonary effort is normal. No respiratory distress.      Breath sounds: No wheezing, rhonchi or rales.   Chest:      Comments: Tunneled HD catheter to right chest wall. No erythema, induration, pain to palpation, discharge or fluctuance noted. Dressing is in place.  Abdominal:      General: Bowel sounds are normal. There is no distension.      Palpations: Abdomen is soft.      Tenderness: There is no abdominal tenderness.   Genitourinary:     Comments: Purewick in place.  Musculoskeletal:      Cervical back: Neck supple.      Right lower le+ Pitting Edema present.      Left lower le+ Pitting Edema present.   Skin:     General: Skin is warm and dry.      Capillary Refill: Capillary refill takes less than 2 seconds.      Coloration: Skin is not jaundiced.      Findings: No erythema.   Neurological:      General: No focal deficit present.      Mental Status: She is alert. Mental status is at baseline.      Cranial Nerves: No cranial nerve deficit.   Psychiatric:         Mood and Affect: Mood normal.         Behavior: Behavior normal. Behavior is cooperative.       Significant Labs:  BMP:   Recent Labs   Lab 01/10/23  0401   GLU 87      K 3.8      CO2 25   BUN 60*   CREATININE 6.1*   CALCIUM 8.4*   MG 2.1     CBC:   Recent Labs   Lab 23   WBC 17.47*   RBC 2.84*   HGB 7.7*   HCT 25.8*      MCV 91   MCH 27.1   MCHC 29.8*     CMP:   Recent Labs   Lab 01/10/23  0401   GLU 87   CALCIUM 8.4*   ALBUMIN 2.4*   PROT 5.6*      K 3.8   CO2 25      BUN 60*   CREATININE 6.1*   ALKPHOS 71   ALT 6*   AST 16   BILITOT 0.4     Coagulation:   Recent Labs   Lab 01/10/23  0401   INR 1.1   APTT 26.6     LFTs:   Recent Labs   Lab 01/10/23  0401   ALT 6*   AST 16   ALKPHOS 71   BILITOT 0.4   PROT 5.6*   ALBUMIN 2.4*     Microbiology Results (last 7 days)       Procedure Component Value Units Date/Time    Blood  culture [[id number]] Collected: 01/09/23 2348    Order Status: Completed Specimen: Blood from Peripheral, Lower Arm, Left Updated: 01/10/23 0715     Blood Culture, Routine No Growth to date    Narrative:      Left Peripheral    Blood culture [[id number]] Collected: 01/09/23 2348    Order Status: Completed Specimen: Blood from Peripheral, Lower Arm, Right Updated: 01/10/23 0715     Blood Culture, Routine No Growth to date    Narrative:      Right Peripheral          Specimen (24h ago, onward)      None          Recent Labs   Lab 01/05/23  1443   COLORU Yellow   SPECGRAV 1.010   PHUR 5.0   PROTEINUA 1+*   BACTERIA Many*   NITRITE Negative   LEUKOCYTESUR 3+*   HYALINECASTS 5*     Renal biopsy results from 10/26/2022:              Significant Imaging:  I have reviewed all imagining in the last 24 hours.    Assessment/Plan:     * Bacteremia due to Enterococcus  - Infectious Disease consulted and following, currently on ertapenem and vancomycin  - tentative plans for line holiday with TDC removal per Interventional Radiology     Urinary tract infection due to extended-spectrum beta lactamase (ESBL) producing Escherichia coli  - Infectious Disease consulted and following  - management per primary team    Anemia due to chronic kidney disease  - goal hemoglobin 10-12, currently 7.7  - most recent iron panel with iron 15, transferrin 138, TIBC 204, 7% saturation and ferritin 378  - transfuse for hemoglobin < 7.0  - defer IV iron in light of bacteremia, will consider epogen (was receiving at [facility])    Primary pauci-immune necrotizing and crescentic glomerulonephritis  Microscopic polyangiitis  Acute renal failure on dialysis  Miscroscopic polyangiitis with renal (biopsy proven pauci immune necrotizing & crescentic glomerulonephritis) and pulmonary involvement s/p Solumedrol 1,000 mg IV x3 doses, PLEX x5 sessions (10/26/2022, 10/27/2022, 10/29/2022, 10/30/2022, 11/01/2022, 11/02/2022, 11/03/2022), Rituximab 375 mg/m^2 x4 (600 mg)  on 10/27/2022, 11/03/2922,11/10/2022,11/172022) with cyclophosphamide 7.5 mg/kg on 10/27/2022 and 11/10/2022 (every 14 days). Now iHD for which she receives HD on one but usually twice weekly for metabolic clearance via tunneled HD catheter roughly x2 a week with last HD Friday (01/06/2023).    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  2)  Necrotizing cresentic glomerulonephritis/[id number] necrotizing cresecentic glomerulonephritis.  3)  Focal acute pyelonephritis.  4)  Mild-to-moderate arterionephrosclerosis    Plan/Recommendations:  - WILFREDO with HD dependence and still makes urine (consent for inpatient HD obtained in ED)  - patient last HD on 1/6, she is dialyzed twice weekly on average (usually Monday and Friday)   - okay for line holiday and will monitor closely for acute indications for RRT in the interim while awaiting for blood cultures to clear x48 hours  - can continue Lasix 40 mg daily for now and will reassess daily if titration required   - currently on prednisone taper 10 mg daily x5 days followed by 5 mg daily with atovaquone 1.5 grams faily for PJP prophylaxis in addition to PPI for ulcer prophylaxis   - she is not a candidate for immunosuppressive therapy at this time given active infection  - renal diet if not NPO  - strict I/Os and daily weights  - daily RFPs and magnesium level  - renally dose all medications to eGFR  - avoid nephrotoxic agents when feasible (i.e. intra-arterial contrast, NSAIDs, supra-therapeutic vancomycin troughs, etc.)    Primary hypertension  - management per primary team    Hypothyroid  - management per primary team    Thank you for your consult. I will follow-up with patient. Please contact us if you have any additional questions.    Pj Paz MD  Nephrology  [facility] - [facility]    ATTENDING PHYSICIAN ATTESTATION  I have personally verified the history and examined the patient. I thoroughly reviewed  the demographic, clinical, laboratorial and imaging information available in medical records. I agree with the assessment and recommendations provided by the subspecialty resident who was under my supervision.

## 2023-08-28 ENCOUNTER — INITIAL CONSULT (OUTPATIENT)
Dept: VASCULAR SURGERY | Facility: CLINIC | Age: 78
End: 2023-08-28
Payer: MEDICARE

## 2023-08-28 VITALS
HEART RATE: 73 BPM | BODY MASS INDEX: 22.49 KG/M2 | SYSTOLIC BLOOD PRESSURE: 140 MMHG | DIASTOLIC BLOOD PRESSURE: 79 MMHG | WEIGHT: 119.06 LBS

## 2023-08-28 DIAGNOSIS — N18.6 ESRD (END STAGE RENAL DISEASE): Primary | ICD-10-CM

## 2023-08-28 DIAGNOSIS — T80.1XXA VASCULAR COMPLICATIONS FOLLOWING INFUSION, TRANSFUSION AND THERAPEUTIC INJECTION, INITIAL ENCOUNTER: ICD-10-CM

## 2023-08-28 PROCEDURE — 99999 PR PBB SHADOW E&M-EST. PATIENT-LVL V: ICD-10-PCS | Mod: PBBFAC,,, | Performed by: SURGERY

## 2023-08-28 PROCEDURE — 99205 PR OFFICE/OUTPT VISIT, NEW, LEVL V, 60-74 MIN: ICD-10-PCS | Mod: S$GLB,,, | Performed by: SURGERY

## 2023-08-28 PROCEDURE — 99999 PR PBB SHADOW E&M-EST. PATIENT-LVL V: CPT | Mod: PBBFAC,,, | Performed by: SURGERY

## 2023-08-28 PROCEDURE — 99205 OFFICE O/P NEW HI 60 MIN: CPT | Mod: S$GLB,,, | Performed by: SURGERY
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
## 2023-08-29 ENCOUNTER — ANESTHESIA EVENT (OUTPATIENT)
Dept: SURGERY | Facility: HOSPITAL | Age: 78
End: 2023-08-29
Payer: MEDICARE

## 2023-08-30 ENCOUNTER — HOSPITAL ENCOUNTER (OUTPATIENT)
Dept: RADIOLOGY | Facility: HOSPITAL | Age: 78
Discharge: HOME | End: 2023-08-30
Attending: SURGERY
Payer: MEDICARE

## 2023-08-30 DIAGNOSIS — N18.6 ESRD (END STAGE RENAL DISEASE): ICD-10-CM

## 2023-08-30 DIAGNOSIS — T80.1XXA VASCULAR COMPLICATIONS FOLLOWING INFUSION, TRANSFUSION AND THERAPEUTIC INJECTION, INITIAL ENCOUNTER: ICD-10-CM

## 2023-08-30 PROCEDURE — 93970 EXTREMITY STUDY: CPT | Mod: 26,,, | Performed by: STUDENT IN AN ORGANIZED HEALTH CARE EDUCATION/TRAINING PROGRAM

## 2023-08-30 PROCEDURE — 93970 EXTREMITY STUDY: CPT | Mod: TC

## 2023-08-30 PROCEDURE — 93970 US VEIN MAPPING, OTHER, BILATERAL: ICD-10-PCS | Mod: 26,,, | Performed by: STUDENT IN AN ORGANIZED HEALTH CARE EDUCATION/TRAINING PROGRAM

## 2023-08-31 ENCOUNTER — HOSPITAL ENCOUNTER (OUTPATIENT)
Dept: CARDIOLOGY | Facility: HOSPITAL | Age: 78
Discharge: HOME | End: 2023-08-31
Attending: INTERNAL MEDICINE
Payer: MEDICARE

## 2023-08-31 ENCOUNTER — HOSPITAL ENCOUNTER (OUTPATIENT)
Dept: PREADMISSION TESTING | Facility: HOSPITAL | Age: 78
Discharge: HOME | End: 2023-08-31
Attending: INTERNAL MEDICINE
Payer: MEDICARE

## 2023-08-31 ENCOUNTER — OFFICE VISIT (OUTPATIENT)
Dept: CARDIOLOGY | Facility: CLINIC | Age: 78
End: 2023-08-31
Payer: MEDICARE

## 2023-08-31 VITALS
RESPIRATION RATE: 15 BRPM | OXYGEN SATURATION: 97 % | HEART RATE: 66 BPM | WEIGHT: 120.69 LBS | HEIGHT: 61 IN | BODY MASS INDEX: 22.78 KG/M2 | DIASTOLIC BLOOD PRESSURE: 72 MMHG | SYSTOLIC BLOOD PRESSURE: 132 MMHG

## 2023-08-31 VITALS
HEART RATE: 65 BPM | WEIGHT: 121.06 LBS | SYSTOLIC BLOOD PRESSURE: 147 MMHG | RESPIRATION RATE: 18 BRPM | TEMPERATURE: 96 F | BODY MASS INDEX: 22.86 KG/M2 | OXYGEN SATURATION: 99 % | HEIGHT: 61 IN | DIASTOLIC BLOOD PRESSURE: 70 MMHG

## 2023-08-31 VITALS — WEIGHT: 121 LBS | BODY MASS INDEX: 22.84 KG/M2 | HEIGHT: 61 IN

## 2023-08-31 DIAGNOSIS — R06.09 DOE (DYSPNEA ON EXERTION): ICD-10-CM

## 2023-08-31 DIAGNOSIS — I10 PRIMARY HYPERTENSION: ICD-10-CM

## 2023-08-31 DIAGNOSIS — Z79.899 HIGH RISK MEDICATIONS (NOT ANTICOAGULANTS) LONG-TERM USE: ICD-10-CM

## 2023-08-31 DIAGNOSIS — R13.10 DYSPHAGIA, UNSPECIFIED TYPE: ICD-10-CM

## 2023-08-31 DIAGNOSIS — Z01.810 PREOP CARDIOVASCULAR EXAM: Primary | ICD-10-CM

## 2023-08-31 DIAGNOSIS — N18.6 ESRD (END STAGE RENAL DISEASE): ICD-10-CM

## 2023-08-31 DIAGNOSIS — I70.1 ATHEROSCLEROSIS OF RENAL ARTERY: ICD-10-CM

## 2023-08-31 DIAGNOSIS — I50.32 CHRONIC DIASTOLIC HEART FAILURE: ICD-10-CM

## 2023-08-31 DIAGNOSIS — G47.33 OSA (OBSTRUCTIVE SLEEP APNEA): ICD-10-CM

## 2023-08-31 DIAGNOSIS — N25.81 SECONDARY HYPERPARATHYROIDISM OF RENAL ORIGIN: ICD-10-CM

## 2023-08-31 DIAGNOSIS — Z01.818 PREOP TESTING: Primary | ICD-10-CM

## 2023-08-31 DIAGNOSIS — Z01.810 PREOP CARDIOVASCULAR EXAM: ICD-10-CM

## 2023-08-31 LAB
ANION GAP SERPL CALC-SCNC: 16 MMOL/L (ref 8–16)
ASCENDING AORTA: 2.13 CM
AV INDEX (PROSTH): 0.79
AV MEAN GRADIENT: 5 MMHG
AV PEAK GRADIENT: 9 MMHG
AV REGURGITATION PRESSURE HALF TIME: 646.55 MS
AV VALVE AREA BY VELOCITY RATIO: 1.25 CM²
AV VALVE AREA: 1.41 CM²
AV VELOCITY RATIO: 0.7
BASOPHILS # BLD AUTO: 0.03 K/UL (ref 0–0.2)
BASOPHILS NFR BLD: 0.3 % (ref 0–1.9)
BSA FOR ECHO PROCEDURE: 1.54 M2
BUN SERPL-MCNC: 81 MG/DL (ref 8–23)
CALCIUM SERPL-MCNC: 9.3 MG/DL (ref 8.7–10.5)
CHLORIDE SERPL-SCNC: 99 MMOL/L (ref 95–110)
CO2 SERPL-SCNC: 24 MMOL/L (ref 23–29)
CREAT SERPL-MCNC: 4.1 MG/DL (ref 0.5–1.4)
CV ECHO LV RWT: 0.42 CM
CV STRESS BASE HR: 68 BPM
DIASTOLIC BLOOD PRESSURE: 86 MMHG
DIFFERENTIAL METHOD: ABNORMAL
DOP CALC AO PEAK VEL: 1.49 M/S
DOP CALC AO VTI: 29.1 CM
DOP CALC LVOT AREA: 1.8 CM2
DOP CALC LVOT DIAMETER: 1.51 CM
DOP CALC LVOT PEAK VEL: 1.04 M/S
DOP CALC LVOT STROKE VOLUME: 40.99 CM3
DOP CALC MV VTI: 28.6 CM
DOP CALCLVOT PEAK VEL VTI: 22.9 CM
E WAVE DECELERATION TIME: 165.33 MSEC
E/A RATIO: 0.69
E/E' RATIO: 10.53 M/S
ECHO LV POSTERIOR WALL: 0.73 CM (ref 0.6–1.1)
EOSINOPHIL # BLD AUTO: 0 K/UL (ref 0–0.5)
EOSINOPHIL NFR BLD: 0.3 % (ref 0–8)
ERYTHROCYTE [DISTWIDTH] IN BLOOD BY AUTOMATED COUNT: 15.9 % (ref 11.5–14.5)
EST. GFR  (NO RACE VARIABLE): 11 ML/MIN/1.73 M^2
FRACTIONAL SHORTENING: 26 % (ref 28–44)
GLUCOSE SERPL-MCNC: 75 MG/DL (ref 70–110)
HCT VFR BLD AUTO: 39 % (ref 37–48.5)
HGB BLD-MCNC: 12.2 G/DL (ref 12–16)
IMM GRANULOCYTES # BLD AUTO: 0.08 K/UL (ref 0–0.04)
IMM GRANULOCYTES NFR BLD AUTO: 0.7 % (ref 0–0.5)
INTERVENTRICULAR SEPTUM: 0.74 CM (ref 0.6–1.1)
IVC DIAMETER: 0.87 CM
LA MAJOR: 4.7 CM
LA MINOR: 4.39 CM
LA WIDTH: 4 CM
LEFT ATRIUM SIZE: 3.19 CM
LEFT ATRIUM VOLUME INDEX: 32.2 ML/M2
LEFT ATRIUM VOLUME: 49.24 CM3
LEFT INTERNAL DIMENSION IN SYSTOLE: 2.56 CM (ref 2.1–4)
LEFT VENTRICLE DIASTOLIC VOLUME INDEX: 32.01 ML/M2
LEFT VENTRICLE DIASTOLIC VOLUME: 48.98 ML
LEFT VENTRICLE MASS INDEX: 43 G/M2
LEFT VENTRICLE SYSTOLIC VOLUME INDEX: 15.4 ML/M2
LEFT VENTRICLE SYSTOLIC VOLUME: 23.58 ML
LEFT VENTRICULAR INTERNAL DIMENSION IN DIASTOLE: 3.45 CM (ref 3.5–6)
LEFT VENTRICULAR MASS: 65.52 G
LV LATERAL E/E' RATIO: 9.88 M/S
LV SEPTAL E/E' RATIO: 11.29 M/S
LVOT MG: 2.48 MMHG
LVOT MV: 0.76 CM/S
LYMPHOCYTES # BLD AUTO: 2.2 K/UL (ref 1–4.8)
LYMPHOCYTES NFR BLD: 18.8 % (ref 18–48)
MCH RBC QN AUTO: 28.1 PG (ref 27–31)
MCHC RBC AUTO-ENTMCNC: 31.3 G/DL (ref 32–36)
MCV RBC AUTO: 90 FL (ref 82–98)
MONOCYTES # BLD AUTO: 1 K/UL (ref 0.3–1)
MONOCYTES NFR BLD: 8.5 % (ref 4–15)
MV MEAN GRADIENT: 1 MMHG
MV PEAK A VEL: 1.14 M/S
MV PEAK E VEL: 0.79 M/S
MV PEAK GRADIENT: 5 MMHG
MV STENOSIS PRESSURE HALF TIME: 49.19 MS
MV VALVE AREA BY CONTINUITY EQUATION: 1.43 CM2
MV VALVE AREA P 1/2 METHOD: 4.47 CM2
NEUTROPHILS # BLD AUTO: 8.2 K/UL (ref 1.8–7.7)
NEUTROPHILS NFR BLD: 71.4 % (ref 38–73)
NRBC BLD-RTO: 0 /100 WBC
OHS CV CPX 85 PERCENT MAX PREDICTED HEART RATE MALE: 118
OHS CV CPX MAX PREDICTED HEART RATE: 139
OHS CV CPX PATIENT IS FEMALE: 1
OHS CV CPX PATIENT IS MALE: 0
OHS CV CPX PEAK DIASTOLIC BLOOD PRESSURE: 91 MMHG
OHS CV CPX PEAK HEAR RATE: 133 BPM
OHS CV CPX PEAK RATE PRESSURE PRODUCT: ABNORMAL
OHS CV CPX PEAK SYSTOLIC BLOOD PRESSURE: 195 MMHG
OHS CV CPX PERCENT MAX PREDICTED HEART RATE ACHIEVED: 96
OHS CV CPX RATE PRESSURE PRODUCT PRESENTING: ABNORMAL
PISA AR MAX VEL: 2.91 M/S
PISA MRMAX VEL: 5.2 M/S
PISA TR MAX VEL: 2.5 M/S
PLATELET # BLD AUTO: 314 K/UL (ref 150–450)
PMV BLD AUTO: 10.5 FL (ref 9.2–12.9)
POTASSIUM SERPL-SCNC: 4 MMOL/L (ref 3.5–5.1)
PULM VEIN S/D RATIO: 1.63
PV PEAK D VEL: 0.35 M/S
PV PEAK S VEL: 0.57 M/S
RA MAJOR: 3.8 CM
RA PRESSURE ESTIMATED: 3 MMHG
RA WIDTH: 2.8 CM
RBC # BLD AUTO: 4.34 M/UL (ref 4–5.4)
RIGHT VENTRICULAR END-DIASTOLIC DIMENSION: 3.08 CM
RV TB RVSP: 6 MMHG
RV TISSUE DOPPLER FREE WALL SYSTOLIC VELOCITY 1 (APICAL 4 CHAMBER VIEW): 14.33 CM/S
SINUS: 2.15 CM
SODIUM SERPL-SCNC: 139 MMOL/L (ref 136–145)
STJ: 2.12 CM
STRESS ECHO POST EXERCISE DUR MIN: 9 MINUTES
STRESS ECHO POST EXERCISE DUR SEC: 39 SECONDS
SYSTOLIC BLOOD PRESSURE: 159 MMHG
TDI LATERAL: 0.08 M/S
TDI SEPTAL: 0.07 M/S
TDI: 0.08 M/S
TR MAX PG: 25 MMHG
TRICUSPID ANNULAR PLANE SYSTOLIC EXCURSION: 1.44 CM
TV REST PULMONARY ARTERY PRESSURE: 28 MMHG
WBC # BLD AUTO: 11.42 K/UL (ref 3.9–12.7)
Z-SCORE OF LEFT VENTRICULAR DIMENSION IN END DIASTOLE: -2.58
Z-SCORE OF LEFT VENTRICULAR DIMENSION IN END SYSTOLE: -0.62

## 2023-08-31 PROCEDURE — 93320 DOPPLER ECHO COMPLETE: CPT | Mod: 26,,, | Performed by: INTERNAL MEDICINE

## 2023-08-31 PROCEDURE — 3078F PR MOST RECENT DIASTOLIC BLOOD PRESSURE < 80 MM HG: ICD-10-PCS | Mod: CPTII,S$GLB,, | Performed by: INTERNAL MEDICINE

## 2023-08-31 PROCEDURE — 1159F MED LIST DOCD IN RCRD: CPT | Mod: CPTII,S$GLB,, | Performed by: INTERNAL MEDICINE

## 2023-08-31 PROCEDURE — 3288F PR FALLS RISK ASSESSMENT DOCUMENTED: ICD-10-PCS | Mod: CPTII,S$GLB,, | Performed by: INTERNAL MEDICINE

## 2023-08-31 PROCEDURE — 3075F PR MOST RECENT SYSTOLIC BLOOD PRESS GE 130-139MM HG: ICD-10-PCS | Mod: CPTII,S$GLB,, | Performed by: INTERNAL MEDICINE

## 2023-08-31 PROCEDURE — 99214 PR OFFICE/OUTPT VISIT, EST, LEVL IV, 30-39 MIN: ICD-10-PCS | Mod: S$GLB,,, | Performed by: INTERNAL MEDICINE

## 2023-08-31 PROCEDURE — 1101F PR PT FALLS ASSESS DOC 0-1 FALLS W/OUT INJ PAST YR: ICD-10-PCS | Mod: CPTII,S$GLB,, | Performed by: INTERNAL MEDICINE

## 2023-08-31 PROCEDURE — 1159F PR MEDICATION LIST DOCUMENTED IN MEDICAL RECORD: ICD-10-PCS | Mod: CPTII,S$GLB,, | Performed by: INTERNAL MEDICINE

## 2023-08-31 PROCEDURE — 36415 COLL VENOUS BLD VENIPUNCTURE: CPT | Performed by: INTERNAL MEDICINE

## 2023-08-31 PROCEDURE — 63600175 PHARM REV CODE 636 W HCPCS: Performed by: INTERNAL MEDICINE

## 2023-08-31 PROCEDURE — 1126F AMNT PAIN NOTED NONE PRSNT: CPT | Mod: CPTII,S$GLB,, | Performed by: INTERNAL MEDICINE

## 2023-08-31 PROCEDURE — 93351 STRESS ECHO (CUPID ONLY): ICD-10-PCS | Mod: 26,,, | Performed by: INTERNAL MEDICINE

## 2023-08-31 PROCEDURE — 99999 PR PBB SHADOW E&M-EST. PATIENT-LVL V: CPT | Mod: PBBFAC,,, | Performed by: INTERNAL MEDICINE

## 2023-08-31 PROCEDURE — 99999 PR PBB SHADOW E&M-EST. PATIENT-LVL V: ICD-10-PCS | Mod: PBBFAC,,, | Performed by: INTERNAL MEDICINE

## 2023-08-31 PROCEDURE — 93325 STRESS ECHO (CUPID ONLY): ICD-10-PCS | Mod: 26,,, | Performed by: INTERNAL MEDICINE

## 2023-08-31 PROCEDURE — 1126F PR PAIN SEVERITY QUANTIFIED, NO PAIN PRESENT: ICD-10-PCS | Mod: CPTII,S$GLB,, | Performed by: INTERNAL MEDICINE

## 2023-08-31 PROCEDURE — 1101F PT FALLS ASSESS-DOCD LE1/YR: CPT | Mod: CPTII,S$GLB,, | Performed by: INTERNAL MEDICINE

## 2023-08-31 PROCEDURE — 1160F RVW MEDS BY RX/DR IN RCRD: CPT | Mod: CPTII,S$GLB,, | Performed by: INTERNAL MEDICINE

## 2023-08-31 PROCEDURE — 93351 STRESS TTE COMPLETE: CPT | Mod: 26,,, | Performed by: INTERNAL MEDICINE

## 2023-08-31 PROCEDURE — 99214 OFFICE O/P EST MOD 30 MIN: CPT | Mod: S$GLB,,, | Performed by: INTERNAL MEDICINE

## 2023-08-31 PROCEDURE — 3288F FALL RISK ASSESSMENT DOCD: CPT | Mod: CPTII,S$GLB,, | Performed by: INTERNAL MEDICINE

## 2023-08-31 PROCEDURE — 85025 COMPLETE CBC W/AUTO DIFF WBC: CPT | Performed by: INTERNAL MEDICINE

## 2023-08-31 PROCEDURE — 3075F SYST BP GE 130 - 139MM HG: CPT | Mod: CPTII,S$GLB,, | Performed by: INTERNAL MEDICINE

## 2023-08-31 PROCEDURE — 3078F DIAST BP <80 MM HG: CPT | Mod: CPTII,S$GLB,, | Performed by: INTERNAL MEDICINE

## 2023-08-31 PROCEDURE — 80048 BASIC METABOLIC PNL TOTAL CA: CPT | Performed by: INTERNAL MEDICINE

## 2023-08-31 PROCEDURE — 93325 DOPPLER ECHO COLOR FLOW MAPG: CPT

## 2023-08-31 PROCEDURE — 93325 DOPPLER ECHO COLOR FLOW MAPG: CPT | Mod: 26,,, | Performed by: INTERNAL MEDICINE

## 2023-08-31 PROCEDURE — 1160F PR REVIEW ALL MEDS BY PRESCRIBER/CLIN PHARMACIST DOCUMENTED: ICD-10-PCS | Mod: CPTII,S$GLB,, | Performed by: INTERNAL MEDICINE

## 2023-08-31 PROCEDURE — 93320 STRESS ECHO (CUPID ONLY): ICD-10-PCS | Mod: 26,,, | Performed by: INTERNAL MEDICINE

## 2023-08-31 RX ORDER — DOBUTAMINE HYDROCHLORIDE 400 MG/100ML
0-30 INJECTION INTRAVENOUS CONTINUOUS
Status: DISCONTINUED | OUTPATIENT
Start: 2023-08-31 | End: 2023-09-01 | Stop reason: HOSPADM

## 2023-08-31 RX ORDER — ATROPINE SULFATE 0.1 MG/ML
0.25 INJECTION INTRAVENOUS ONCE
Status: COMPLETED | OUTPATIENT
Start: 2023-08-31 | End: 2023-08-31

## 2023-08-31 RX ADMIN — DOBUTAMINE HYDROCHLORIDE 10 MCG/KG/MIN: 400 INJECTION INTRAVENOUS at 01:08

## 2023-08-31 RX ADMIN — ATROPINE SULFATE 0.25 MG: 0.1 INJECTION, SOLUTION INTRAVENOUS at 01:08

## 2023-08-31 NOTE — DISCHARGE INSTRUCTIONS
Before 7 AM, enter through the Emergency Entrance..   After 7 AM enter through the Main Entrance.      Your procedure  is scheduled for _9/7/2023_________.    Call [phone] between 2pm and 5pm on ___9/6/2023___to find out your arrival time for the day of surgery.    You may use the main entrance to the hospital on the [facility] side, or the entrance that is next to the [facility].    You may have one visitor.  No children allowed.     You will be going to the Same Day Surgery Unit on the 2nd floor of the hospital.    Important instructions:  Do not eat anything after midnight.  You may have plain water, non carbonated.  You may also have Gatorade or Powerade after midnight.    Stop all fluids 2 hours before your surgery.    It is okay to brush your teeth.  Do not have gum, candy or mints.    SEE MEDICATION SHEET.   TAKE MEDICATIONS AS DIRECTED WITH SIPS OF WATER.      Do not take any diabetic medication on the morning of surgery unless instructed to do so by your doctor or pre op nurse.    STOP taking Aspirin, Ibuprofen,  Advil, Motrin, Mobic(meloxicam), Aleve (naproxen), Fish oil, and Vitamin E for at least 7 days before your surgery.     You may take Tylenol if needed which is not a blood thinner.    Please shower the night before and the morning of your surgery.      Follow any Prep Instructions given by your surgeon.    Use Chlorhexidine soap as instructed by your pre op nurse.   Please place clean linens on your bed the night before surgery. Please wear fresh clean clothing after each shower.    No shaving of procedural area at least 4-5 days before surgery due to increased risk of skin irritation and/or possible infection.    Female patients may be asked for a urine specimen on the morning of the surgery.  Please check with your nurse before using the restroom.    Contact lenses and removable denture work may not be worn during your procedure.    You may wear deodorant only. If you are having  breast surgery, do not wear deodorant on the operative side.    Do not wear powder, body lotion, perfume/cologne or make-up.    Do not wear any jewelry or have any metal on your body.    You will be asked to remove any dentures or partials for the procedure.    If you are going home on the same day of surgery, you must arrange for a family member or a friend to drive you home.  Public transportation is prohibited.  You will not be able to drive home if you were given anesthesia or sedation.    Patients who want to have their Post-op prescriptions filled from our in-house [facility], bring a Credit/Debit Card or cash with you. A co-pay may be required.  The pharmacy closes at 5:30 pm.    Wear loose fitting clothes allowing for bandages.    Please leave money and valuables home.      You may bring your cell phone.    Call the doctor if fever or illness should occur before your surgery.    Call [phone] to contact us here if needed.                            CLOTHES ON DAY OF SURGERY    SHOULDER surgery:  you must have a very oversized shirt.  Very, Very large.  You will probably have a large sling on with your arm strapped to your chest.  You will not be able to put the arm of the operated shoulder into a sleeve.  You can put the arm of the un-operated shoulder into the sleeve, but the shirt will need to be draped over the operated shoulder.       ARM or HAND surgery:  make sure that your sleeves are large and loose enough to pass over large dressings or cast.      BREAST or UNDERARM surgery:  wear a loose, button down shirt so that you can dress without raising your arms over your head.    ABDOMINAL surgery:  wear loose, comfortable clothing.  Nothing tight around the abdomen.  NO JEANS    PENIS or SCROTAL surgery:  loose comfortable clothing.  Large sweat pants, pajama pants or a robe.  ABSOLUTELY NO JEANS      LEG or FOOT surgery:  wear large loose pants that are able to pass over any large dressings or  casts.  You could also wear loose shorts or a skirt.
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
## 2023-09-06 ENCOUNTER — LAB VISIT (OUTPATIENT)
Dept: LAB | Facility: HOSPITAL | Age: 78
End: 2023-09-06
Attending: SURGERY
Payer: MEDICARE

## 2023-09-06 DIAGNOSIS — Z01.818 PREOPERATIVE TESTING: ICD-10-CM

## 2023-09-06 DIAGNOSIS — Z79.01 ANTICOAGULANT LONG-TERM USE: ICD-10-CM

## 2023-09-06 DIAGNOSIS — N18.6 ESRD (END STAGE RENAL DISEASE): ICD-10-CM

## 2023-09-06 LAB
ABO + RH BLD: NORMAL
ALBUMIN SERPL BCP-MCNC: 3.2 G/DL (ref 3.5–5.2)
ALP SERPL-CCNC: 76 U/L (ref 55–135)
ALT SERPL W/O P-5'-P-CCNC: 72 U/L (ref 10–44)
ANION GAP SERPL CALC-SCNC: 10 MMOL/L (ref 8–16)
APTT PPP: 29.2 SEC (ref 21–32)
AST SERPL-CCNC: 33 U/L (ref 10–40)
BILIRUB SERPL-MCNC: 0.3 MG/DL (ref 0.1–1)
BLD GP AB SCN CELLS X3 SERPL QL: NORMAL
BUN SERPL-MCNC: 54 MG/DL (ref 8–23)
CALCIUM SERPL-MCNC: 9.3 MG/DL (ref 8.7–10.5)
CHLORIDE SERPL-SCNC: 101 MMOL/L (ref 95–110)
CO2 SERPL-SCNC: 27 MMOL/L (ref 23–29)
CREAT SERPL-MCNC: 3.3 MG/DL (ref 0.5–1.4)
EST. GFR  (NO RACE VARIABLE): 14 ML/MIN/1.73 M^2
GLUCOSE SERPL-MCNC: 57 MG/DL (ref 70–110)
INR PPP: 1 (ref 0.8–1.2)
POTASSIUM SERPL-SCNC: 3.9 MMOL/L (ref 3.5–5.1)
PROT SERPL-MCNC: 6.5 G/DL (ref 6–8.4)
PROTHROMBIN TIME: 10.7 SEC (ref 9–12.5)
SODIUM SERPL-SCNC: 138 MMOL/L (ref 136–145)
SPECIMEN OUTDATE: NORMAL

## 2023-09-06 PROCEDURE — 85730 THROMBOPLASTIN TIME PARTIAL: CPT | Performed by: SURGERY

## 2023-09-06 PROCEDURE — 85610 PROTHROMBIN TIME: CPT | Performed by: SURGERY

## 2023-09-06 PROCEDURE — 86850 RBC ANTIBODY SCREEN: CPT | Performed by: SURGERY

## 2023-09-06 PROCEDURE — 36415 COLL VENOUS BLD VENIPUNCTURE: CPT | Performed by: SURGERY

## 2023-09-06 PROCEDURE — 80053 COMPREHEN METABOLIC PANEL: CPT | Performed by: SURGERY

## 2023-09-07 ENCOUNTER — ANESTHESIA (OUTPATIENT)
Dept: SURGERY | Facility: HOSPITAL | Age: 78
End: 2023-09-07
Payer: MEDICARE

## 2023-09-07 ENCOUNTER — HOSPITAL ENCOUNTER (OUTPATIENT)
Facility: HOSPITAL | Age: 78
Discharge: HOME | End: 2023-09-07
Attending: SURGERY | Admitting: SURGERY
Payer: MEDICARE

## 2023-09-07 VITALS
TEMPERATURE: 97 F | BODY MASS INDEX: 22.86 KG/M2 | RESPIRATION RATE: 19 BRPM | OXYGEN SATURATION: 100 % | SYSTOLIC BLOOD PRESSURE: 129 MMHG | HEART RATE: 58 BPM | DIASTOLIC BLOOD PRESSURE: 63 MMHG | WEIGHT: 121 LBS

## 2023-09-07 DIAGNOSIS — Z01.818 PREOPERATIVE TESTING: ICD-10-CM

## 2023-09-07 DIAGNOSIS — N18.6 ESRD (END STAGE RENAL DISEASE): Primary | ICD-10-CM

## 2023-09-07 DIAGNOSIS — Z79.01 ANTICOAGULANT LONG-TERM USE: ICD-10-CM

## 2023-09-07 LAB
ERYTHROCYTE [DISTWIDTH] IN BLOOD BY AUTOMATED COUNT: 15.9 % (ref 11.5–14.5)
HCT VFR BLD AUTO: 39.2 % (ref 37–48.5)
HGB BLD-MCNC: 12.3 G/DL (ref 12–16)
MCH RBC QN AUTO: 28.8 PG (ref 27–31)
MCHC RBC AUTO-ENTMCNC: 31.4 G/DL (ref 32–36)
MCV RBC AUTO: 92 FL (ref 82–98)
PLATELET # BLD AUTO: 297 K/UL (ref 150–450)
PMV BLD AUTO: 9.7 FL (ref 9.2–12.9)
POCT GLUCOSE: 108 MG/DL (ref 70–110)
RBC # BLD AUTO: 4.27 M/UL (ref 4–5.4)
WBC # BLD AUTO: 21.15 K/UL (ref 3.9–12.7)

## 2023-09-07 PROCEDURE — 25000003 PHARM REV CODE 250: Performed by: ANESTHESIOLOGY

## 2023-09-07 PROCEDURE — D9220A PRA ANESTHESIA: ICD-10-PCS | Mod: ANES,,, | Performed by: ANESTHESIOLOGY

## 2023-09-07 PROCEDURE — 63600175 PHARM REV CODE 636 W HCPCS: Performed by: SURGERY

## 2023-09-07 PROCEDURE — 63600175 PHARM REV CODE 636 W HCPCS: Performed by: STUDENT IN AN ORGANIZED HEALTH CARE EDUCATION/TRAINING PROGRAM

## 2023-09-07 PROCEDURE — 37000009 HC ANESTHESIA EA ADD 15 MINS: Performed by: SURGERY

## 2023-09-07 PROCEDURE — 71000033 HC RECOVERY, INTIAL HOUR: Performed by: SURGERY

## 2023-09-07 PROCEDURE — 63600175 PHARM REV CODE 636 W HCPCS: Performed by: ANESTHESIOLOGY

## 2023-09-07 PROCEDURE — 25000003 PHARM REV CODE 250: Performed by: SURGERY

## 2023-09-07 PROCEDURE — 76942 ECHO GUIDE FOR BIOPSY: CPT | Performed by: ANESTHESIOLOGY

## 2023-09-07 PROCEDURE — D9220A PRA ANESTHESIA: Mod: ANES,,, | Performed by: ANESTHESIOLOGY

## 2023-09-07 PROCEDURE — 25000003 PHARM REV CODE 250: Performed by: STUDENT IN AN ORGANIZED HEALTH CARE EDUCATION/TRAINING PROGRAM

## 2023-09-07 PROCEDURE — 85027 COMPLETE CBC AUTOMATED: CPT | Performed by: SURGERY

## 2023-09-07 PROCEDURE — 36830 PR CREAT AV FISTULA,NON-AUTOGENOUS GRAFT: ICD-10-PCS | Mod: ,,, | Performed by: SURGERY

## 2023-09-07 PROCEDURE — 36000707: Performed by: SURGERY

## 2023-09-07 PROCEDURE — D9220A PRA ANESTHESIA: ICD-10-PCS | Mod: CRNA,,, | Performed by: STUDENT IN AN ORGANIZED HEALTH CARE EDUCATION/TRAINING PROGRAM

## 2023-09-07 PROCEDURE — D9220A PRA ANESTHESIA: Mod: CRNA,,, | Performed by: STUDENT IN AN ORGANIZED HEALTH CARE EDUCATION/TRAINING PROGRAM

## 2023-09-07 PROCEDURE — 27201423 OPTIME MED/SURG SUP & DEVICES STERILE SUPPLY: Performed by: SURGERY

## 2023-09-07 PROCEDURE — 37000008 HC ANESTHESIA 1ST 15 MINUTES: Performed by: SURGERY

## 2023-09-07 PROCEDURE — 36000706: Performed by: SURGERY

## 2023-09-07 PROCEDURE — 36415 COLL VENOUS BLD VENIPUNCTURE: CPT | Performed by: SURGERY

## 2023-09-07 PROCEDURE — 71000015 HC POSTOP RECOV 1ST HR: Performed by: SURGERY

## 2023-09-07 PROCEDURE — 36830 ARTERY-VEIN NONAUTOGRAFT: CPT | Mod: ,,, | Performed by: SURGERY

## 2023-09-07 PROCEDURE — C1768 GRAFT, VASCULAR: HCPCS | Performed by: SURGERY

## 2023-09-07 DEVICE — GRAFT PROPATEN 4-7MM X 45CM: Type: IMPLANTABLE DEVICE | Site: ARM | Status: FUNCTIONAL

## 2023-09-07 RX ORDER — SODIUM CHLORIDE, SODIUM LACTATE, POTASSIUM CHLORIDE, CALCIUM CHLORIDE 600; 310; 30; 20 MG/100ML; MG/100ML; MG/100ML; MG/100ML
INJECTION, SOLUTION INTRAVENOUS CONTINUOUS
Status: DISCONTINUED | OUTPATIENT
Start: 2023-09-07 | End: 2023-09-07 | Stop reason: HOSPADM

## 2023-09-07 RX ORDER — PROPOFOL 10 MG/ML
VIAL (ML) INTRAVENOUS
Status: DISCONTINUED | OUTPATIENT
Start: 2023-09-07 | End: 2023-09-07

## 2023-09-07 RX ORDER — LIDOCAINE HYDROCHLORIDE 20 MG/ML
INJECTION INTRAVENOUS
Status: DISCONTINUED | OUTPATIENT
Start: 2023-09-07 | End: 2023-09-07

## 2023-09-07 RX ORDER — ROPIVACAINE HYDROCHLORIDE 5 MG/ML
INJECTION, SOLUTION EPIDURAL; INFILTRATION; PERINEURAL
Status: COMPLETED | OUTPATIENT
Start: 2023-09-07 | End: 2023-09-07

## 2023-09-07 RX ORDER — ONDANSETRON 2 MG/ML
4 INJECTION INTRAMUSCULAR; INTRAVENOUS DAILY PRN
Status: DISCONTINUED | OUTPATIENT
Start: 2023-09-07 | End: 2023-09-07 | Stop reason: HOSPADM

## 2023-09-07 RX ORDER — PROTAMINE SULFATE 10 MG/ML
INJECTION, SOLUTION INTRAVENOUS
Status: DISCONTINUED | OUTPATIENT
Start: 2023-09-07 | End: 2023-09-07

## 2023-09-07 RX ORDER — FENTANYL CITRATE 50 UG/ML
INJECTION, SOLUTION INTRAMUSCULAR; INTRAVENOUS
Status: DISCONTINUED | OUTPATIENT
Start: 2023-09-07 | End: 2023-09-07

## 2023-09-07 RX ORDER — PROPOFOL 10 MG/ML
VIAL (ML) INTRAVENOUS CONTINUOUS PRN
Status: DISCONTINUED | OUTPATIENT
Start: 2023-09-07 | End: 2023-09-07

## 2023-09-07 RX ORDER — SODIUM CHLORIDE 0.9 % (FLUSH) 0.9 %
10 SYRINGE (ML) INJECTION
Status: DISCONTINUED | OUTPATIENT
Start: 2023-09-07 | End: 2023-09-07 | Stop reason: HOSPADM

## 2023-09-07 RX ORDER — HYDROMORPHONE HYDROCHLORIDE 2 MG/ML
0.2 INJECTION, SOLUTION INTRAMUSCULAR; INTRAVENOUS; SUBCUTANEOUS EVERY 5 MIN PRN
Status: DISCONTINUED | OUTPATIENT
Start: 2023-09-07 | End: 2023-09-07 | Stop reason: HOSPADM

## 2023-09-07 RX ORDER — ONDANSETRON 2 MG/ML
INJECTION INTRAMUSCULAR; INTRAVENOUS
Status: DISCONTINUED | OUTPATIENT
Start: 2023-09-07 | End: 2023-09-07

## 2023-09-07 RX ORDER — HEPARIN SODIUM 1000 [USP'U]/ML
INJECTION, SOLUTION INTRAVENOUS; SUBCUTANEOUS
Status: DISCONTINUED | OUTPATIENT
Start: 2023-09-07 | End: 2023-09-07 | Stop reason: HOSPADM

## 2023-09-07 RX ORDER — PHENYLEPHRINE HYDROCHLORIDE 10 MG/ML
INJECTION INTRAVENOUS
Status: DISCONTINUED | OUTPATIENT
Start: 2023-09-07 | End: 2023-09-07

## 2023-09-07 RX ORDER — ACETAMINOPHEN 500 MG
1000 TABLET ORAL
Status: COMPLETED | OUTPATIENT
Start: 2023-09-07 | End: 2023-09-07

## 2023-09-07 RX ORDER — LIDOCAINE HYDROCHLORIDE 10 MG/ML
INJECTION, SOLUTION EPIDURAL; INFILTRATION; INTRACAUDAL; PERINEURAL
Status: DISCONTINUED | OUTPATIENT
Start: 2023-09-07 | End: 2023-09-07 | Stop reason: HOSPADM

## 2023-09-07 RX ADMIN — SODIUM CHLORIDE, SODIUM LACTATE, POTASSIUM CHLORIDE, AND CALCIUM CHLORIDE: .6; .31; .03; .02 INJECTION, SOLUTION INTRAVENOUS at 07:09

## 2023-09-07 RX ADMIN — VANCOMYCIN HYDROCHLORIDE 1000 MG: 1 INJECTION, POWDER, LYOPHILIZED, FOR SOLUTION INTRAVENOUS at 07:09

## 2023-09-07 RX ADMIN — GLYCOPYRROLATE 0.2 MG: 0.2 INJECTION, SOLUTION INTRAMUSCULAR; INTRAVITREAL at 07:09

## 2023-09-07 RX ADMIN — PHENYLEPHRINE HYDROCHLORIDE 50 MCG: 10 INJECTION INTRAVENOUS at 08:09

## 2023-09-07 RX ADMIN — PROTAMINE SULFATE 40 MG: 10 INJECTION, SOLUTION INTRAVENOUS at 10:09

## 2023-09-07 RX ADMIN — FENTANYL CITRATE 12.5 MCG: 50 INJECTION, SOLUTION INTRAMUSCULAR; INTRAVENOUS at 10:09

## 2023-09-07 RX ADMIN — PHENYLEPHRINE HYDROCHLORIDE 50 MCG: 10 INJECTION INTRAVENOUS at 07:09

## 2023-09-07 RX ADMIN — HEPARIN SODIUM 5000 UNITS: 1000 INJECTION, SOLUTION INTRAVENOUS; SUBCUTANEOUS at 09:09

## 2023-09-07 RX ADMIN — FENTANYL CITRATE 12.5 MCG: 50 INJECTION, SOLUTION INTRAMUSCULAR; INTRAVENOUS at 08:09

## 2023-09-07 RX ADMIN — PROPOFOL 20 MG: 10 INJECTION, EMULSION INTRAVENOUS at 07:09

## 2023-09-07 RX ADMIN — FENTANYL CITRATE 25 MCG: 50 INJECTION, SOLUTION INTRAMUSCULAR; INTRAVENOUS at 07:09

## 2023-09-07 RX ADMIN — PHENYLEPHRINE HYDROCHLORIDE 100 MCG: 10 INJECTION INTRAVENOUS at 08:09

## 2023-09-07 RX ADMIN — PROPOFOL 100 MCG/KG/MIN: 10 INJECTION, EMULSION INTRAVENOUS at 07:09

## 2023-09-07 RX ADMIN — PHENYLEPHRINE HYDROCHLORIDE 100 MCG: 10 INJECTION INTRAVENOUS at 11:09

## 2023-09-07 RX ADMIN — PHENYLEPHRINE HYDROCHLORIDE 100 MCG: 10 INJECTION INTRAVENOUS at 10:09

## 2023-09-07 RX ADMIN — FENTANYL CITRATE 25 MCG: 50 INJECTION, SOLUTION INTRAMUSCULAR; INTRAVENOUS at 09:09

## 2023-09-07 RX ADMIN — ONDANSETRON 4 MG: 2 INJECTION, SOLUTION INTRAMUSCULAR; INTRAVENOUS at 07:09

## 2023-09-07 RX ADMIN — ACETAMINOPHEN 1000 MG: 500 TABLET ORAL at 06:09

## 2023-09-07 RX ADMIN — PHENYLEPHRINE HYDROCHLORIDE 25 MCG/MIN: 10 INJECTION INTRAVENOUS at 08:09

## 2023-09-07 RX ADMIN — ROPIVACAINE HYDROCHLORIDE 30 ML: 5 INJECTION, SOLUTION EPIDURAL; INFILTRATION; PERINEURAL at 07:09

## 2023-09-07 RX ADMIN — LIDOCAINE HYDROCHLORIDE 60 MG: 20 INJECTION, SOLUTION INTRAVENOUS at 07:09

## 2023-09-07 RX ADMIN — PROPOFOL 100 MCG/KG/MIN: 10 INJECTION, EMULSION INTRAVENOUS at 10:09

## 2023-09-07 RX ADMIN — GLYCOPYRROLATE 0.2 MG: 0.2 INJECTION, SOLUTION INTRAMUSCULAR; INTRAVITREAL at 09:09

## 2023-09-07 NOTE — ANESTHESIA PROCEDURE NOTES
Peripheral Block    Patient location during procedure: pre-op    Reason for block: primary anesthetic    Diagnosis: ESRD   Start time: 9/7/2023 7:22 AM  Timeout: 9/7/2023 7:20 AM   End time: 9/7/2023 7:37 AM    Staffing  Authorizing Provider: Elan López Chi, MD  Performing Provider: Elan López Chi, MD    Staffing  Performed by: Elan López Chi, MD  Authorized by: Elan López Chi, MD    Preanesthetic Checklist  Completed: patient identified, IV checked, site marked, risks and benefits discussed, surgical consent, monitors and equipment checked, pre-op evaluation and timeout performed  Peripheral Block  Patient position: supine  Prep: ChloraPrep  Patient monitoring: heart rate, cardiac monitor, continuous pulse ox, continuous capnometry and frequent blood pressure checks  Block type: supraclavicular  Laterality: left  Injection technique: single shot  Needle  Needle type: Stimuplex   Needle gauge: 22 G  Needle length: 2 in  Needle localization: anatomical landmarks and ultrasound guidance   -ultrasound image captured on disc.  Assessment  Injection assessment: negative aspiration, negative parasthesia and local visualized surrounding nerve  Paresthesia pain: none  Heart rate change: no  Slow fractionated injection: yes  Pain Tolerance: comfortable throughout block and no complaints  Medications:    Medications: ropivacaine (NAROPIN) injection 0.5% - Perineural   30 mL - 9/7/2023 7:32:00 AM    Additional Notes  VSS.  [facility] RN monitoring vitals throughout procedure.  Patient tolerated procedure well. Ropiv given in 5mL increments, with negative aspiration in between.    Left supraclavicular block-20mL Ropiv 0.5%  Left intercostobrachial field block performed with 10mL Ropiv 0.5%.  Total local anesthetics=30mL of Ropiv 0..5%

## 2023-09-07 NOTE — OR NURSING
0720 - time out done for block x2 per Dr López  Pt on monitors  0737 - Block x2 complete - pt tolerated well

## 2023-09-07 NOTE — BRIEF OP NOTE
[facility] [place]  Brief Operative Note    Surgery Date: 9/7/2023     Surgeon(s) and Role:     * John Hudson MD - Primary    Assisting Surgeon: None    Pre-op Diagnosis:  ESRD (end stage renal disease) [N18.6]    Post-op Diagnosis:  Post-Op Diagnosis Codes:     * ESRD (end stage renal disease) [N18.6]    Procedure(s) (LRB):  INSERTION, GRAFT, ARTERIOVENOUS (Left)    Anesthesia: Local MAC    Operative Findings: small axillary vein and brachial artery, s/p L AVG placement .  Good thrill and palpable radial pulse    Estimated Blood Loss: * No values recorded between 9/7/2023  8:17 AM and 9/7/2023 11:28 AM *         Specimens:   Specimen (24h ago, onward)      None              Discharge Note    OUTCOME: Patient tolerated treatment/procedure well without complication and is now ready for discharge.    DISPOSITION: Home or Self Care    FINAL DIAGNOSIS:  ESRD    FOLLOWUP: In clinic in 2 weeks    DISCHARGE INSTRUCTIONS:    Wound care - you may remove to dressing in 48 hours and then leave open to air.  If your dressing becomes saturated, you may remove it sooner.  You may shower in 24 hours.  Do not soak the wound in a bath.  Pat area to wash and dry.    Activity - You may resume normal activity.  No heavy lifting (<5 lb) with left arm.  Elevate to alleviate swelling     Medication - you may resume your Eliquis starting tomorrow 9/8.  Resume all other home meds.  You may take tylenol for pain    Follow up -  You should return to clinic to see Dr. Hudson in 2 weeks .  If you have any questions or concerns, you may call his office at [phone].       Discharge Procedure Orders   PROTIME-INR   Standing Status: Future Number of Occurrences: 1 Standing Exp. Date: 11/03/24     APTT   Standing Status: Future Number of Occurrences: 1 Standing Exp. Date: 11/03/24     COMPREHENSIVE METABOLIC PANEL   Standing Status: Future Number of Occurrences: 1 Standing Exp. Date: 11/03/24

## 2023-09-07 NOTE — PROGRESS NOTES
"Pharmacokinetic Initial Assessment: IV Vancomycin    Assessment/Plan:    Initiate intravenous vancomycin with loading dose of 1000 mg once with subsequent doses when random concentrations are less than 20 mcg/mL  Desired empiric serum trough concentration is 10 to 20 mcg/mL  Draw vancomycin random level on 9-8-23 at 0600.  Pharmacy will continue to follow and monitor vancomycin.      Please contact pharmacy at extension [phone] with any questions regarding this assessment.     Thank you for the consult,   Marlen Borja       Patient brief summary:  Kristin Goodman is a 78 y.o. female initiated on antimicrobial therapy with IV Vancomycin for treatment of suspected  surgical prophylaxis    Drug Allergies:   Review of patient's allergies indicates:   Allergen Reactions    Ampicillin     Peaches [peach (prunus persica)] Other (See Comments)     Pt unable to state type of reaction. Information obtained from daughter who states she was informed of allergy from patient.    Penicillins      Other reaction(s): Hives, anaphylaxis    Sulfa (sulfonamide antibiotics) Rash and Hives       Actual Body Weight:   54.9 kg    Renal Function:   Estimated Creatinine Clearance: 10.9 mL/min (A) (based on SCr of 3.3 mg/dL (H)).,     Dialysis Method (if applicable):  N/A    CBC (last 72 hours):  Recent Labs   Lab Result Units 09/07/23  0631   WBC K/uL 21.15*   Hemoglobin g/dL 12.3   Hematocrit % 39.2   Platelets K/uL 297       Metabolic Panel (last 72 hours):  Recent Labs   Lab Result Units 09/06/23  0850   Sodium mmol/L 138   Potassium mmol/L 3.9   Chloride mmol/L 101   CO2 mmol/L 27   Glucose mg/dL 57*   BUN mg/dL 54*   Creatinine mg/dL 3.3*   Albumin g/dL 3.2*   Total Bilirubin mg/dL 0.3   Alkaline Phosphatase U/L 76   AST U/L 33   ALT U/L 72*       Drug levels (last 3 results):  No results for input(s): "VANCOMYCINRA", "VANCORANDOM", "VANCOMYCINPE", "VANCOPEAK", "VANCOMYCINTR", "VANCOTROUGH" in the last 72 hours.    Microbiologic " Results:  Microbiology Results (last 7 days)       ** No results found for the last 168 hours. **

## 2023-09-07 NOTE — DISCHARGE INSTRUCTIONS
DISCHARGE INSTRUCTIONS:      Wound care - you may remove to dressing in 48 hours and then leave open to air.  If your dressing becomes saturated, you may remove it sooner.  You may shower in 24 hours.  Do not soak the wound in a bath.  Pat area to wash and dry.     Activity - You may resume normal activity.  No heavy lifting (<5 lb) with left arm.  Elevate to alleviate swelling     Medication - you may resume your Eliquis starting tomorrow 9/8.  Resume all other home meds.  You may take tylenol for pain     Follow up -  You should return to clinic to see Dr. Hudson in 2 weeks .  If you have any questions or concerns, you may call his office at [phone].      DIET: You may resume your home diet. If nausea is present, increase your diet gradually with fluids and bland foods    FOLLOWUP: In clinic in 2 weeks    If severe bleeding occurs, lie down, apply pressure. Call 911    No driving, alcoholic beverages or signing legal documents for next 24 hours or while taking pain medication.       CALL 911  Chest pain   Shortness of breath     CALL THE DOCTOR:   Fever of 100.4 degrees Fahrenheit or higher, or as directed by your healthcare provider.  Red, hot, or swollen area around the site.   Pain, cold, or numbness in the hand    If any unusual problems or difficulties occur contact your doctor. If you cannot contact your doctor but feel your signs and symptoms warrant a physicians attention return to the emergency room.      Fall Prevention  Millions of people fall every year and injure themselves. You may have had anesthesia or sedation which may increase your risk of falling. You may have health issues that put you at an increased risk of falling.     Here are ways to reduce your risk of falling.    Make your home safe by keeping walkways clear of objects you may trip over.  Use non-slip pads under rugs. Do not use area rugs or small throw rugs.  Use non-slip mats in bathtubs and showers.  Install handrails  and lights on staircases.  Do not walk in poorly lit areas.  Do not stand on chairs or wobbly ladders.  Use caution when reaching overhead or looking upward. This position can cause a loss of balance.  Be sure your shoes fit properly, have non-slip bottoms and are in good condition.   Wear shoes both inside and out. Avoid going barefoot or wearing slippers.  Be cautious when going up and down stairs, curbs, and when walking on uneven sidewalks.  If your balance is poor, consider using a cane or walker.  If your fall was related to alcohol use, stop or limit alcohol intake.   If your fall was related to use of sleeping medicines, talk to your doctor about this. You may need to reduce your dosage at bedtime if you awaken during the night to go to the bathroom.    To reduce the need for nighttime bathroom trips:  Avoid drinking fluids for several hours before going to bed  Empty your bladder before going to bed  Men can keep a urinal at the bedside  Stay as active as you can. Balance, flexibility, strength, and endurance all come from exercise. They all play a role in preventing falls. Ask your healthcare provider which types of activity are right for you.  Get your vision checked on a regular basis.  If you have pets, know where they are before you stand up or walk so you don't trip over them.  Use night lights.

## 2023-09-07 NOTE — TRANSFER OF CARE
Anesthesia Transfer of Care Note    Patient: Kristin Goodman    Procedure(s) Performed: Procedure(s) (LRB):  INSERTION, GRAFT, ARTERIOVENOUS (Left)    Patient location: PACU    Transport from OR: Transported from OR on 2-3 L/min O2 by NC with adequate spontaneous ventilation    Post pain: adequate analgesia    Post assessment: no apparent anesthetic complications    Post vital signs: stable    Level of consciousness: responds to stimulation    Complications: none    Transfer of care protocol was followed      Last vitals:   Visit Vitals  BP (!) 106/59 (BP Location: Right arm, Patient Position: Lying)   Pulse 70   Temp 35.6 °C (96.1 °F) (Temporal)   Resp 14   Wt 54.9 kg (121 lb)   SpO2 100%   Breastfeeding No   BMI 22.86 kg/m²

## 2023-09-07 NOTE — ANESTHESIA POSTPROCEDURE EVALUATION
Anesthesia Post Evaluation    Patient: Kristin Goodman    Procedure(s) Performed: Procedure(s) (LRB):  INSERTION, GRAFT, ARTERIOVENOUS (Left)    Final Anesthesia Type: regional      Patient location during evaluation: PACU  Patient participation: Yes- Able to Participate  Level of consciousness: awake and alert and oriented  Post-procedure vital signs: reviewed and stable  Pain management: adequate  Airway patency: patent    PONV status at discharge: No PONV  Anesthetic complications: no      Cardiovascular status: hemodynamically stable and blood pressure returned to baseline  Respiratory status: spontaneous ventilation, room air and unassisted  Hydration status: euvolemic  Follow-up not needed.          Vitals Value Taken Time   /63 09/07/23 1230   Temp 36.1 °C (97 °F) 09/07/23 1230   Pulse 58 09/07/23 1230   Resp 19 09/07/23 1230   SpO2 100 % 09/07/23 1230         Event Time   Out of Recovery 12:34:00         Pain/Kathryn Score: Pain Rating Prior to Med Admin: 0 (9/7/2023  7:15 AM)  Pain Rating Post Med Admin: 0 (9/7/2023  7:15 AM)  Kathryn Score: 10 (9/7/2023 12:17 PM)

## 2023-09-10 NOTE — ASSESSMENT & PLAN NOTE
Positive blood cxs 1/5, 1/7, 1/9. Received 1 gm IV vancomycin at [facility] on 1/10.   HD tunelled cath removed 1/10.  Also has midline from [facility] - removed. Surface echo - no vegetation.  -infectious disease consultation appreciated  -last surveillance bcxs 1/11/23 ngtd  -continue Vanc  -ct abd/pelvis with oral contrast today per ID recs - negative  -d/w ID - 2-weeks vanc from negative bcxs/line removal        49.8

## 2023-09-11 NOTE — OP NOTE
[facility] - Vascular Surgery   Operative Note    SUMMARY     Patient: Kristin Goodman    Surgery Date: 9/7/2023     Surgeon(s) and Role:     * John Hudson MD - Primary    Assisting Surgeon: None    Pre-op Diagnosis:  ESRD (end stage renal disease) [N18.6]    Post-op Diagnosis:  Post-Op Diagnosis Codes:     * ESRD (end stage renal disease) [N18.6]    Procedure(s) (LRB):  INSERTION, GRAFT, ARTERIOVENOUS (Left)      Procedures Performed:  Left arm AV graft placement from brachial artery to axillary vein.    Anesthesia: Local MAC    Findings of the procedure:  Small artery and axillary and measuring less than 5 mm.  AV graft tunneled superficially in upper arm.  Palpable thrill and graft completion.  Palpable radial pulse    Estimated Blood Loss: 20 mL         Specimens:   Specimen (24h ago, onward)      None            Indications for Procedure:  Kristin Goodman is a 78 y.o. female with a history of end-stage renal disease who has been on dialysis via tunneled dialysis catheter that was currently in her left internal jugular.  Of note she would had a right internal jugular tunneled dialysis catheter which became infected and she had an occlusive thrombus of her right subclavian vein.  Patient is right handed and therefore decision was made in accordance with the patient and her daughter to proceed with a left upper extremity AV graft insertion permanent dialysis access..    Description of procedure:    The Patient was identified in the preoperative holding area. The patient was taken to the operating room and placed supine on the table.  Mac Anesthesia was induced. The left arm was/were prepped and draped in the usual standard fashion.  A surgical pause was conducted confirming correct patient, correct site, correct pre-operative preparations.    The patient was seen in the Holding Room. The risks, benefits, complications, treatment options, and expected outcomes were discussed with the patient. The  patient concurred with the proposed plan, giving informed consent.  The site of surgery properly noted/marked. The patient was taken to the Operating Room, identified correctly and the procedure verified. A Time Out was held and the above information confirmed.    The arm was prepped and draped in a sterile fashion. A skin incision was made just above the AC fossa and dissection carried down to the brachial artery. We then made a second incision just distal to the hair bearing area of the axilla, dissected through the soft tissue and isolated the axillary vein. A 4-7mm Propaten PTFE graft was tunneled subcutaneously in the lateral upper arm using the small Gladstone tunneling instrument.  The The patient was given 5000 units IV heparin.  After 3 min, the brachial artery was controlled proximally with a profunda clamp and distally with the vessel loops. An arteriotomy was made with an 11 blade and extended to approx 4mm with stevens scissors.  The artery was flushed with heparinized saline, and the 4mm end sewn end-to-side to the brachial artery with 6-0 Prolene in a continuous running fashion. Before tieing down the anastomosis the artery was back- and forward- bled and flushed with heparinized saline.  After completing the anastomosis, the hand was reperfused and the graft was clamped with a soft Tawny clamp.  The distal, 7mm, end of the graft was similarly sewn end-to-side to the axillary vein with 6-0 Prolene. Before tieing down the anastomosis, the graft and veins were individually bled and the venotomy site flushed with heparinized saline. The anastomosis was tied down with the vein unclamped and distended.  The clamp on the graft was released and a good thrill was noted in the graft and axillary vein.  The radial pulse remained palpable.  Meticulous hemostasis was secured using thrombin soaked gel-foam to stop any needle hole bleeding.  The incisions were then closed in layers with 2-0 and 3-0 Vicryl and the skin  with 4-0 Monocryl. Wounds were dressed with 4x4 and Tegaderm.    All instrument counts were correct.  The patient tolerated the procedure well and was brought to the PACU for recovery.  Was present and scrubbed for all parts of the procedure.

## 2023-09-18 ENCOUNTER — OFFICE VISIT (OUTPATIENT)
Dept: VASCULAR SURGERY | Facility: CLINIC | Age: 78
End: 2023-09-18
Payer: MEDICARE

## 2023-09-18 ENCOUNTER — PATIENT MESSAGE (OUTPATIENT)
Dept: FAMILY MEDICINE | Facility: CLINIC | Age: 78
End: 2023-09-18
Payer: MEDICARE

## 2023-09-18 VITALS
HEIGHT: 61 IN | SYSTOLIC BLOOD PRESSURE: 150 MMHG | WEIGHT: 128 LBS | HEART RATE: 77 BPM | OXYGEN SATURATION: 100 % | DIASTOLIC BLOOD PRESSURE: 70 MMHG | BODY MASS INDEX: 24.17 KG/M2

## 2023-09-18 DIAGNOSIS — N18.6 ESRD (END STAGE RENAL DISEASE): Primary | ICD-10-CM

## 2023-09-18 PROCEDURE — 99999 PR PBB SHADOW E&M-EST. PATIENT-LVL III: ICD-10-PCS | Mod: PBBFAC,,, | Performed by: SURGERY

## 2023-09-18 PROCEDURE — 1126F AMNT PAIN NOTED NONE PRSNT: CPT | Mod: CPTII,S$GLB,, | Performed by: SURGERY

## 2023-09-18 PROCEDURE — 1159F PR MEDICATION LIST DOCUMENTED IN MEDICAL RECORD: ICD-10-PCS | Mod: CPTII,S$GLB,, | Performed by: SURGERY

## 2023-09-18 PROCEDURE — 1126F PR PAIN SEVERITY QUANTIFIED, NO PAIN PRESENT: ICD-10-PCS | Mod: CPTII,S$GLB,, | Performed by: SURGERY

## 2023-09-18 PROCEDURE — 1159F MED LIST DOCD IN RCRD: CPT | Mod: CPTII,S$GLB,, | Performed by: SURGERY

## 2023-09-18 PROCEDURE — 3077F PR MOST RECENT SYSTOLIC BLOOD PRESSURE >= 140 MM HG: ICD-10-PCS | Mod: CPTII,S$GLB,, | Performed by: SURGERY

## 2023-09-18 PROCEDURE — 3078F PR MOST RECENT DIASTOLIC BLOOD PRESSURE < 80 MM HG: ICD-10-PCS | Mod: CPTII,S$GLB,, | Performed by: SURGERY

## 2023-09-18 PROCEDURE — 99999 PR PBB SHADOW E&M-EST. PATIENT-LVL III: CPT | Mod: PBBFAC,,, | Performed by: SURGERY

## 2023-09-18 PROCEDURE — 3078F DIAST BP <80 MM HG: CPT | Mod: CPTII,S$GLB,, | Performed by: SURGERY

## 2023-09-18 PROCEDURE — 3288F FALL RISK ASSESSMENT DOCD: CPT | Mod: CPTII,S$GLB,, | Performed by: SURGERY

## 2023-09-18 PROCEDURE — 1101F PT FALLS ASSESS-DOCD LE1/YR: CPT | Mod: CPTII,S$GLB,, | Performed by: SURGERY

## 2023-09-18 PROCEDURE — 99024 PR POST-OP FOLLOW-UP VISIT: ICD-10-PCS | Mod: S$GLB,,, | Performed by: SURGERY

## 2023-09-18 PROCEDURE — 3077F SYST BP >= 140 MM HG: CPT | Mod: CPTII,S$GLB,, | Performed by: SURGERY

## 2023-09-18 PROCEDURE — 99024 POSTOP FOLLOW-UP VISIT: CPT | Mod: S$GLB,,, | Performed by: SURGERY

## 2023-09-18 PROCEDURE — 1101F PR PT FALLS ASSESS DOC 0-1 FALLS W/OUT INJ PAST YR: ICD-10-PCS | Mod: CPTII,S$GLB,, | Performed by: SURGERY

## 2023-09-18 PROCEDURE — 3288F PR FALLS RISK ASSESSMENT DOCUMENTED: ICD-10-PCS | Mod: CPTII,S$GLB,, | Performed by: SURGERY

## 2023-09-18 RX ORDER — DOXYCYCLINE 100 MG/1
100 CAPSULE ORAL EVERY 12 HOURS
Qty: 20 CAPSULE | Refills: 0 | Status: SHIPPED | OUTPATIENT
Start: 2023-09-18 | End: 2023-10-31 | Stop reason: ALTCHOICE

## 2023-09-18 NOTE — PROGRESS NOTES
S/p LUE AVG   Post op swelling but good thrill  Arm wrapped with ace  Starting doxycycline for mild erythema  RTC next week after U/S to confirm use for HD

## 2023-09-20 ENCOUNTER — PATIENT MESSAGE (OUTPATIENT)
Dept: FAMILY MEDICINE | Facility: CLINIC | Age: 78
End: 2023-09-20
Payer: MEDICARE

## 2023-09-22 ENCOUNTER — HOSPITAL ENCOUNTER (OUTPATIENT)
Dept: RADIOLOGY | Facility: HOSPITAL | Age: 78
Discharge: HOME | End: 2023-09-22
Attending: SURGERY
Payer: MEDICARE

## 2023-09-22 DIAGNOSIS — N18.6 ESRD (END STAGE RENAL DISEASE): ICD-10-CM

## 2023-09-22 PROCEDURE — 93990 DOPPLER FLOW TESTING: CPT | Mod: 26,,, | Performed by: RADIOLOGY

## 2023-09-22 PROCEDURE — 93990 US HEMODIALYSIS ACCESS: ICD-10-PCS | Mod: 26,,, | Performed by: RADIOLOGY

## 2023-09-22 PROCEDURE — 93990 DOPPLER FLOW TESTING: CPT | Mod: TC

## 2023-09-25 ENCOUNTER — OFFICE VISIT (OUTPATIENT)
Dept: VASCULAR SURGERY | Facility: CLINIC | Age: 78
End: 2023-09-25
Payer: MEDICARE

## 2023-09-25 VITALS
OXYGEN SATURATION: 100 % | WEIGHT: 127.44 LBS | DIASTOLIC BLOOD PRESSURE: 80 MMHG | HEIGHT: 61 IN | BODY MASS INDEX: 24.06 KG/M2 | SYSTOLIC BLOOD PRESSURE: 131 MMHG | HEART RATE: 75 BPM

## 2023-09-25 DIAGNOSIS — N18.6 ESRD (END STAGE RENAL DISEASE): Primary | ICD-10-CM

## 2023-09-25 PROCEDURE — 3075F PR MOST RECENT SYSTOLIC BLOOD PRESS GE 130-139MM HG: ICD-10-PCS | Mod: CPTII,S$GLB,, | Performed by: SURGERY

## 2023-09-25 PROCEDURE — 99024 PR POST-OP FOLLOW-UP VISIT: ICD-10-PCS | Mod: S$GLB,,, | Performed by: SURGERY

## 2023-09-25 PROCEDURE — 1126F AMNT PAIN NOTED NONE PRSNT: CPT | Mod: CPTII,S$GLB,, | Performed by: SURGERY

## 2023-09-25 PROCEDURE — 1126F PR PAIN SEVERITY QUANTIFIED, NO PAIN PRESENT: ICD-10-PCS | Mod: CPTII,S$GLB,, | Performed by: SURGERY

## 2023-09-25 PROCEDURE — 99499 UNLISTED E&M SERVICE: CPT | Mod: S$GLB,,, | Performed by: SURGERY

## 2023-09-25 PROCEDURE — 1159F PR MEDICATION LIST DOCUMENTED IN MEDICAL RECORD: ICD-10-PCS | Mod: CPTII,S$GLB,, | Performed by: SURGERY

## 2023-09-25 PROCEDURE — 99999 PR PBB SHADOW E&M-EST. PATIENT-LVL III: ICD-10-PCS | Mod: PBBFAC,,, | Performed by: SURGERY

## 2023-09-25 PROCEDURE — 3075F SYST BP GE 130 - 139MM HG: CPT | Mod: CPTII,S$GLB,, | Performed by: SURGERY

## 2023-09-25 PROCEDURE — 99999 PR PBB SHADOW E&M-EST. PATIENT-LVL III: CPT | Mod: PBBFAC,,, | Performed by: SURGERY

## 2023-09-25 PROCEDURE — 1159F MED LIST DOCD IN RCRD: CPT | Mod: CPTII,S$GLB,, | Performed by: SURGERY

## 2023-09-25 PROCEDURE — 3079F DIAST BP 80-89 MM HG: CPT | Mod: CPTII,S$GLB,, | Performed by: SURGERY

## 2023-09-25 PROCEDURE — 99024 POSTOP FOLLOW-UP VISIT: CPT | Mod: S$GLB,,, | Performed by: SURGERY

## 2023-09-25 PROCEDURE — 3079F PR MOST RECENT DIASTOLIC BLOOD PRESSURE 80-89 MM HG: ICD-10-PCS | Mod: CPTII,S$GLB,, | Performed by: SURGERY

## 2023-09-25 NOTE — PROGRESS NOTES
S/p DEBBIE AVG 9/7/23  Post op swelling but decent thrill  Swelling improved since last week  Duplex shows adequate flow but arm still too swollen to attempt accessing for HD  Arm wrapped with ace, keep wrapped daily   Continue doxycycline for mild erythema (pt started last Thurs 9/21)  RTC in 2 weeks after U/S to confirm use for HD

## 2023-10-02 DIAGNOSIS — S83.241D ACUTE MEDIAL MENISCUS TEAR OF RIGHT KNEE, SUBSEQUENT ENCOUNTER: ICD-10-CM

## 2023-10-02 DIAGNOSIS — M94.261 CHONDROMALACIA OF RIGHT KNEE: ICD-10-CM

## 2023-10-02 RX ORDER — METHOCARBAMOL 500 MG/1
500 TABLET, FILM COATED ORAL 3 TIMES DAILY PRN
Qty: 60 TABLET | Refills: 0 | Status: SHIPPED | OUTPATIENT
Start: 2023-10-02 | End: 2024-01-12

## 2023-10-02 NOTE — TELEPHONE ENCOUNTER
No care due was identified.  [facility] Embedded Care Due Messages. Reference number: [id number].   10/02/2023 8:27:15 AM CDT

## 2023-10-17 DIAGNOSIS — I10 PRIMARY HYPERTENSION: ICD-10-CM

## 2023-10-18 ENCOUNTER — HOSPITAL ENCOUNTER (OUTPATIENT)
Dept: CARDIOLOGY | Facility: HOSPITAL | Age: 78
Discharge: HOME | End: 2023-10-18
Attending: SURGERY
Payer: MEDICARE

## 2023-10-18 DIAGNOSIS — N18.6 ESRD (END STAGE RENAL DISEASE): ICD-10-CM

## 2023-10-18 LAB
HD NATIVE ARTERY VESSEL: NORMAL
HD OUTFLOW VEIN VESSEL: NORMAL
LEFT DIST ANA PSV: 120 CM/S
LEFT DIST GRAFT PSV: 89 CM/S
LEFT INFLOW PSV: 255 CM/S
LEFT MID GRAFT PSV: 276 CM/S
LEFT OUTFLOW PSV: 140 CM/S
LEFT PROX ANA PSV: 686 CM/S
LEFT PROX GRAFT PSV: 421 CM/S
LEFT VOLUME FLOW PSV: 1325 ML/MIN

## 2023-10-18 PROCEDURE — 93990 DOPPLER FLOW TESTING: CPT | Mod: 26,,, | Performed by: SURGERY

## 2023-10-18 PROCEDURE — 93990 CV US HEMODIALYSIS ACCESS (CUPID ONLY): ICD-10-PCS | Mod: 26,,, | Performed by: SURGERY

## 2023-10-18 PROCEDURE — 93990 DOPPLER FLOW TESTING: CPT | Mod: TC

## 2023-10-18 RX ORDER — AMLODIPINE BESYLATE 10 MG/1
10 TABLET ORAL
Qty: 90 TABLET | Refills: 3 | Status: SHIPPED | OUTPATIENT
Start: 2023-10-18

## 2023-10-23 ENCOUNTER — OFFICE VISIT (OUTPATIENT)
Dept: VASCULAR SURGERY | Facility: CLINIC | Age: 78
End: 2023-10-23
Payer: MEDICARE

## 2023-10-23 DIAGNOSIS — N18.6 ESRD (END STAGE RENAL DISEASE): Primary | ICD-10-CM

## 2023-10-23 PROCEDURE — 99024 PR POST-OP FOLLOW-UP VISIT: ICD-10-PCS | Mod: S$GLB,,, | Performed by: SURGERY

## 2023-10-23 PROCEDURE — 99024 POSTOP FOLLOW-UP VISIT: CPT | Mod: S$GLB,,, | Performed by: SURGERY

## 2023-10-23 NOTE — PROGRESS NOTES
S/p DEBBIE AVG 9/7/23  Post op swelling markedly improved.   Good thrill    Okay to access left arm AV graft for hemodialysis use.    RTC in 4 weeks with U/S  Will plan to remove catheter at that time if no issues  Hold Eliquis prior for catheter removal, last dose 11/17, will see in clinic 11/20

## 2023-10-26 ENCOUNTER — LAB VISIT (OUTPATIENT)
Dept: LAB | Facility: HOSPITAL | Age: 78
End: 2023-10-26
Attending: INTERNAL MEDICINE
Payer: MEDICARE

## 2023-10-26 DIAGNOSIS — N05.8 PRIMARY PAUCI-IMMUNE NECROTIZING AND CRESCENTIC GLOMERULONEPHRITIS: ICD-10-CM

## 2023-10-26 DIAGNOSIS — M31.7 MICROSCOPIC POLYANGIITIS: ICD-10-CM

## 2023-10-26 DIAGNOSIS — N05.7 PRIMARY PAUCI-IMMUNE NECROTIZING AND CRESCENTIC GLOMERULONEPHRITIS: ICD-10-CM

## 2023-10-26 LAB
ALBUMIN SERPL BCP-MCNC: 3.4 G/DL (ref 3.5–5.2)
ALP SERPL-CCNC: 61 U/L (ref 55–135)
ALT SERPL W/O P-5'-P-CCNC: 41 U/L (ref 10–44)
ANION GAP SERPL CALC-SCNC: 14 MMOL/L (ref 8–16)
AST SERPL-CCNC: 34 U/L (ref 10–40)
BASOPHILS # BLD AUTO: 0.03 K/UL (ref 0–0.2)
BASOPHILS NFR BLD: 0.2 % (ref 0–1.9)
BILIRUB SERPL-MCNC: 0.3 MG/DL (ref 0.1–1)
BUN SERPL-MCNC: 73 MG/DL (ref 8–23)
C3 SERPL-MCNC: 162 MG/DL (ref 50–180)
C4 SERPL-MCNC: 45 MG/DL (ref 11–44)
CALCIUM SERPL-MCNC: 9.7 MG/DL (ref 8.7–10.5)
CHLORIDE SERPL-SCNC: 100 MMOL/L (ref 95–110)
CO2 SERPL-SCNC: 28 MMOL/L (ref 23–29)
CREAT SERPL-MCNC: 4 MG/DL (ref 0.5–1.4)
CRP SERPL-MCNC: 2.4 MG/L (ref 0–8.2)
DIFFERENTIAL METHOD: ABNORMAL
EOSINOPHIL # BLD AUTO: 0.1 K/UL (ref 0–0.5)
EOSINOPHIL NFR BLD: 0.5 % (ref 0–8)
ERYTHROCYTE [DISTWIDTH] IN BLOOD BY AUTOMATED COUNT: 15.6 % (ref 11.5–14.5)
ERYTHROCYTE [SEDIMENTATION RATE] IN BLOOD BY PHOTOMETRIC METHOD: 23 MM/HR (ref 0–36)
EST. GFR  (NO RACE VARIABLE): 11.1 ML/MIN/1.73 M^2
GLUCOSE SERPL-MCNC: 66 MG/DL (ref 70–110)
HBV CORE AB SERPL QL IA: NORMAL
HBV CORE AB SERPL QL IA: NORMAL
HBV SURFACE AG SERPL QL IA: NORMAL
HBV SURFACE AG SERPL QL IA: NORMAL
HCT VFR BLD AUTO: 41.3 % (ref 37–48.5)
HGB BLD-MCNC: 12.8 G/DL (ref 12–16)
IMM GRANULOCYTES # BLD AUTO: 0.06 K/UL (ref 0–0.04)
IMM GRANULOCYTES NFR BLD AUTO: 0.5 % (ref 0–0.5)
LYMPHOCYTES # BLD AUTO: 1.9 K/UL (ref 1–4.8)
LYMPHOCYTES NFR BLD: 14.6 % (ref 18–48)
MCH RBC QN AUTO: 29.7 PG (ref 27–31)
MCHC RBC AUTO-ENTMCNC: 31 G/DL (ref 32–36)
MCV RBC AUTO: 96 FL (ref 82–98)
MONOCYTES # BLD AUTO: 1.2 K/UL (ref 0.3–1)
MONOCYTES NFR BLD: 9.5 % (ref 4–15)
NEUTROPHILS # BLD AUTO: 9.8 K/UL (ref 1.8–7.7)
NEUTROPHILS NFR BLD: 74.7 % (ref 38–73)
NRBC BLD-RTO: 0 /100 WBC
PLATELET # BLD AUTO: 233 K/UL (ref 150–450)
PMV BLD AUTO: 10.6 FL (ref 9.2–12.9)
POTASSIUM SERPL-SCNC: 3.8 MMOL/L (ref 3.5–5.1)
PROT SERPL-MCNC: 7.2 G/DL (ref 6–8.4)
RBC # BLD AUTO: 4.31 M/UL (ref 4–5.4)
SODIUM SERPL-SCNC: 142 MMOL/L (ref 136–145)
WBC # BLD AUTO: 13.12 K/UL (ref 3.9–12.7)

## 2023-10-26 PROCEDURE — 86160 COMPLEMENT ANTIGEN: CPT | Performed by: INTERNAL MEDICINE

## 2023-10-26 PROCEDURE — 80053 COMPREHEN METABOLIC PANEL: CPT | Performed by: INTERNAL MEDICINE

## 2023-10-26 PROCEDURE — 36415 COLL VENOUS BLD VENIPUNCTURE: CPT | Performed by: INTERNAL MEDICINE

## 2023-10-26 PROCEDURE — 85025 COMPLETE CBC W/AUTO DIFF WBC: CPT | Performed by: INTERNAL MEDICINE

## 2023-10-26 PROCEDURE — 86704 HEP B CORE ANTIBODY TOTAL: CPT | Performed by: INTERNAL MEDICINE

## 2023-10-26 PROCEDURE — 87340 HEPATITIS B SURFACE AG IA: CPT | Performed by: INTERNAL MEDICINE

## 2023-10-26 PROCEDURE — 86140 C-REACTIVE PROTEIN: CPT | Performed by: INTERNAL MEDICINE

## 2023-10-26 PROCEDURE — 86160 COMPLEMENT ANTIGEN: CPT | Mod: 59 | Performed by: INTERNAL MEDICINE

## 2023-10-26 PROCEDURE — 85652 RBC SED RATE AUTOMATED: CPT | Performed by: INTERNAL MEDICINE

## 2023-10-26 PROCEDURE — 86225 DNA ANTIBODY NATIVE: CPT | Performed by: INTERNAL MEDICINE

## 2023-10-27 LAB — DSDNA AB SER-ACNC: NORMAL [IU]/ML

## 2023-10-30 ENCOUNTER — OFFICE VISIT (OUTPATIENT)
Dept: RHEUMATOLOGY | Facility: CLINIC | Age: 78
End: 2023-10-30
Payer: MEDICARE

## 2023-10-30 VITALS
DIASTOLIC BLOOD PRESSURE: 73 MMHG | HEART RATE: 76 BPM | WEIGHT: 130.5 LBS | BODY MASS INDEX: 24.64 KG/M2 | HEIGHT: 61 IN | SYSTOLIC BLOOD PRESSURE: 146 MMHG

## 2023-10-30 DIAGNOSIS — M31.7 MICROSCOPIC POLYANGIITIS: Primary | ICD-10-CM

## 2023-10-30 DIAGNOSIS — Z79.899 IMMUNOSUPPRESSION DUE TO DRUG THERAPY: ICD-10-CM

## 2023-10-30 DIAGNOSIS — D84.821 IMMUNOSUPPRESSION DUE TO DRUG THERAPY: ICD-10-CM

## 2023-10-30 PROCEDURE — 1159F PR MEDICATION LIST DOCUMENTED IN MEDICAL RECORD: ICD-10-PCS | Mod: CPTII,S$GLB,, | Performed by: INTERNAL MEDICINE

## 2023-10-30 PROCEDURE — 3078F DIAST BP <80 MM HG: CPT | Mod: CPTII,S$GLB,, | Performed by: INTERNAL MEDICINE

## 2023-10-30 PROCEDURE — 3078F PR MOST RECENT DIASTOLIC BLOOD PRESSURE < 80 MM HG: ICD-10-PCS | Mod: CPTII,S$GLB,, | Performed by: INTERNAL MEDICINE

## 2023-10-30 PROCEDURE — 1160F RVW MEDS BY RX/DR IN RCRD: CPT | Mod: CPTII,S$GLB,, | Performed by: INTERNAL MEDICINE

## 2023-10-30 PROCEDURE — 99214 OFFICE O/P EST MOD 30 MIN: CPT | Mod: S$GLB,,, | Performed by: INTERNAL MEDICINE

## 2023-10-30 PROCEDURE — 1126F AMNT PAIN NOTED NONE PRSNT: CPT | Mod: CPTII,S$GLB,, | Performed by: INTERNAL MEDICINE

## 2023-10-30 PROCEDURE — 1160F PR REVIEW ALL MEDS BY PRESCRIBER/CLIN PHARMACIST DOCUMENTED: ICD-10-PCS | Mod: CPTII,S$GLB,, | Performed by: INTERNAL MEDICINE

## 2023-10-30 PROCEDURE — 99999 PR PBB SHADOW E&M-EST. PATIENT-LVL III: CPT | Mod: PBBFAC,,, | Performed by: INTERNAL MEDICINE

## 2023-10-30 PROCEDURE — 99999 PR PBB SHADOW E&M-EST. PATIENT-LVL III: ICD-10-PCS | Mod: PBBFAC,,, | Performed by: INTERNAL MEDICINE

## 2023-10-30 PROCEDURE — 1126F PR PAIN SEVERITY QUANTIFIED, NO PAIN PRESENT: ICD-10-PCS | Mod: CPTII,S$GLB,, | Performed by: INTERNAL MEDICINE

## 2023-10-30 PROCEDURE — 99214 PR OFFICE/OUTPT VISIT, EST, LEVL IV, 30-39 MIN: ICD-10-PCS | Mod: S$GLB,,, | Performed by: INTERNAL MEDICINE

## 2023-10-30 PROCEDURE — 1159F MED LIST DOCD IN RCRD: CPT | Mod: CPTII,S$GLB,, | Performed by: INTERNAL MEDICINE

## 2023-10-30 PROCEDURE — 3077F PR MOST RECENT SYSTOLIC BLOOD PRESSURE >= 140 MM HG: ICD-10-PCS | Mod: CPTII,S$GLB,, | Performed by: INTERNAL MEDICINE

## 2023-10-30 PROCEDURE — 3077F SYST BP >= 140 MM HG: CPT | Mod: CPTII,S$GLB,, | Performed by: INTERNAL MEDICINE

## 2023-10-30 RX ORDER — PREDNISONE 2.5 MG/1
7.5 TABLET ORAL DAILY
Qty: 90 TABLET | Refills: 2 | Status: SHIPPED | OUTPATIENT
Start: 2023-10-30 | End: 2024-02-16

## 2023-10-30 NOTE — PROGRESS NOTES
Subjective:      Patient ID: Kristin Goodman is a 78 y.o. female.    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   3)  CHANGES SUGGESTIVE OF FOCAL ACUTE PYELONEPHRITIS.   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 Palacio on 7/20/23. In the the interim since her last visit, patient reports lupus flares given severe migratory cramps (leg, shoulder, arm, hand, rib cage) ranging from 30mn to 1hr. Noticeable triggers include abrupt weather changes and family members have also noted a temporal relationship with cramping episodes occurring post dialysis sessions on Tues and Saturday. Patient is requesting more potent analgesics as she normally takes Tylenol arthritis 600mg PRN. Patient also on Robaxin 250mg qhs PRN; she cut down on prior dose of 500mg PRN d/t drowsiness and disorientation  "(per daughter was running into walls). Of note, pt most recent CMP on 10/26/23  without any overt electrolytes derangements. Patient is lactose intolerant and does not consume dairy.    Otherwise, she remains prednisone 10mg daily w/o any adverse effects such as wt gain or psychosis. Patient is on bi-annual rituximab infusion. No fevers or chills but endorses recent nightsweats and non-productive cough. No LAD. No vision difficulty, rash or mouth sores. Patient had her annual flu shot and a COVID-19 booster recently.     Wrt to her ESRD, patient still makes urine. Patient recently had LUE AVF placed by [facility] for further dialysis sessions and is pending removal of her port if her graft is deemed fully functional.    Review of Systems   Constitutional:  Positive for fatigue. Negative for fever.        + nightsweats    HENT:  Negative for congestion and sneezing.         Dry mouth   Eyes:  Positive for redness.   Respiratory:  Positive for cough (non-productive).    Gastrointestinal:  Negative for diarrhea and nausea.        + camps    Skin:  Negative for color change and rash.   Psychiatric/Behavioral:  Positive for sleep disturbance.    All other systems reviewed and are negative.       Objective:   BP (!) 146/73   Pulse 76   Ht 5' 1" (1.549 m)   Wt 59.2 kg (130 lb 8.2 oz)   BMI 24.66 kg/m²   Physical Exam   HENT:   Head: Normocephalic and atraumatic.   Nose: Nose normal.   Eyes: Conjunctivae are normal.   Cardiovascular: Normal rate. Exam reveals no gallop and no friction rub.   No murmur heard.  Pulmonary/Chest: Effort normal.   Abdominal: Bowel sounds are normal. She exhibits no distension. There is no abdominal tenderness.   Musculoskeletal:         General: Normal range of motion.      Comments: LUE AVF with palpable thrill   No neuropathy   No LE edema   Neurological: She is alert.   Skin: Skin is warm and dry.   Psychiatric: Mood normal.   Nursing note and vitals reviewed.       4/6/2022   Tender " (AMARO-28) 12 / 28    Swollen (AMARO-28) 6 / 28    Provider Global --   Patient Global --   ESR --   CRP --   AMARO-28 (ESR) --   AMARO-28 (CRP) --   CDAI Score --        Assessment:     1. Microscopic polyangiitis with crescentic GN    2. Immunosuppression due to drug therapy          Plan:     Problem List Items Addressed This Visit          Active Problems    Microscopic polyangiitis with crescentic GN - Primary     She has c/o muscle cramps after and between dialysis days (Tue and Sat) but no recurrent symptoms of vasculitis such as constitutional symptoms , purpura, or pulm symptoms. She remains dialysis dependent.     Labs show no new inflammatory changes so will continue maintenance rituximab every 6 mo; will plan 1000 this cycle then reduce to 500 next cycle if remains stable    She had strong pos MITUL with IC on renal bx but has not developed lupus like symptoms while on rituximab for MPO positive vasculitis         Relevant Medications    predniSONE (DELTASONE) 2.5 MG tablet    Immunosuppression due to drug therapy     Has not had significant interval infections  Discussed vaccines which she obtained recently at appropriate temporal distance from previous rituximab in May          - Decrease Prednisone from 10mg daily to 7.5mg daily   - Will coordinate o0haoxd maintenance  Rituximab  infusion for Nov 2023, last infusion May 2023  - Patient advised to inform dialysis center of muscle cramps to adjust dialysate fluid as needed   - RTC in 3 months to monitor sxs

## 2023-10-31 PROBLEM — D84.821 IMMUNOSUPPRESSION DUE TO DRUG THERAPY: Status: ACTIVE | Noted: 2022-11-10

## 2023-10-31 RX ORDER — SODIUM CHLORIDE 0.9 % (FLUSH) 0.9 %
10 SYRINGE (ML) INJECTION
Status: CANCELLED | OUTPATIENT
Start: 2023-11-01

## 2023-10-31 RX ORDER — ACETAMINOPHEN 325 MG/1
650 TABLET ORAL
Status: CANCELLED | OUTPATIENT
Start: 2023-11-01

## 2023-10-31 RX ORDER — DIPHENHYDRAMINE HYDROCHLORIDE 50 MG/ML
50 INJECTION INTRAMUSCULAR; INTRAVENOUS ONCE AS NEEDED
Status: CANCELLED | OUTPATIENT
Start: 2023-11-01

## 2023-10-31 RX ORDER — HEPARIN 100 UNIT/ML
500 SYRINGE INTRAVENOUS
Status: CANCELLED | OUTPATIENT
Start: 2023-11-01

## 2023-10-31 RX ORDER — FAMOTIDINE 10 MG/ML
20 INJECTION INTRAVENOUS
Status: CANCELLED | OUTPATIENT
Start: 2023-11-01

## 2023-10-31 RX ORDER — EPINEPHRINE 0.3 MG/.3ML
0.3 INJECTION SUBCUTANEOUS ONCE AS NEEDED
Status: CANCELLED | OUTPATIENT
Start: 2023-11-01

## 2023-10-31 RX ORDER — MEPERIDINE HYDROCHLORIDE 50 MG/ML
25 INJECTION INTRAMUSCULAR; INTRAVENOUS; SUBCUTANEOUS
Status: CANCELLED | OUTPATIENT
Start: 2023-11-01 | End: 2023-11-02

## 2023-10-31 NOTE — ASSESSMENT & PLAN NOTE
Has not had significant interval infections  Discussed vaccines which she obtained recently at appropriate temporal distance from previous rituximab in May

## 2023-10-31 NOTE — ASSESSMENT & PLAN NOTE
She has c/o muscle cramps after and between dialysis days (Tue and Sat) but no recurrent symptoms of vasculitis such as constitutional symptoms , purpura, or pulm symptoms. She remains dialysis dependent.     Labs show no new inflammatory changes so will continue maintenance rituximab every 6 mo; will plan 1000 this cycle then reduce to 500 next cycle if remains stable    She had strong pos MITUL with IC on renal bx but has not developed lupus like symptoms while on rituximab for MPO positive vasculitis

## 2023-11-13 PROBLEM — N39.0 UTI (URINARY TRACT INFECTION): Status: RESOLVED | Noted: 2023-08-06 | Resolved: 2023-11-13

## 2023-11-14 ENCOUNTER — TELEPHONE (OUTPATIENT)
Dept: VASCULAR SURGERY | Facility: CLINIC | Age: 78
End: 2023-11-14
Payer: MEDICARE

## 2023-11-14 NOTE — TELEPHONE ENCOUNTER
----- Message from Wen More sent at 11/14/2023  2:06 PM CST -----  Regarding: Ruby [phone]  Who called: Ruby calling [facility]       What is the request in detail: Ruby stated pt has completed 3 treatment with 2 needles and ready to take out catheter       Can the clinic reply by [facility]? No        Would the patient rather a call back or a response via [facility]? Call back       Best call back number:[phone]

## 2023-11-17 ENCOUNTER — INFUSION (OUTPATIENT)
Dept: INFUSION THERAPY | Facility: HOSPITAL | Age: 78
End: 2023-11-17
Payer: MEDICARE

## 2023-11-17 VITALS
HEIGHT: 61 IN | DIASTOLIC BLOOD PRESSURE: 60 MMHG | TEMPERATURE: 98 F | BODY MASS INDEX: 24.64 KG/M2 | SYSTOLIC BLOOD PRESSURE: 129 MMHG | RESPIRATION RATE: 18 BRPM | HEART RATE: 69 BPM | WEIGHT: 130.5 LBS

## 2023-11-17 DIAGNOSIS — M31.7 MICROSCOPIC POLYANGIITIS: Primary | ICD-10-CM

## 2023-11-17 PROCEDURE — 96365 THER/PROPH/DIAG IV INF INIT: CPT

## 2023-11-17 PROCEDURE — 96375 TX/PRO/DX INJ NEW DRUG ADDON: CPT

## 2023-11-17 PROCEDURE — 25000003 PHARM REV CODE 250: Performed by: INTERNAL MEDICINE

## 2023-11-17 PROCEDURE — 96367 TX/PROPH/DG ADDL SEQ IV INF: CPT

## 2023-11-17 PROCEDURE — 63600175 PHARM REV CODE 636 W HCPCS: Performed by: INTERNAL MEDICINE

## 2023-11-17 PROCEDURE — 96366 THER/PROPH/DIAG IV INF ADDON: CPT

## 2023-11-17 RX ORDER — HEPARIN 100 UNIT/ML
500 SYRINGE INTRAVENOUS
Status: DISCONTINUED | OUTPATIENT
Start: 2023-11-17 | End: 2023-11-17 | Stop reason: HOSPADM

## 2023-11-17 RX ORDER — ACETAMINOPHEN 325 MG/1
650 TABLET ORAL
OUTPATIENT
Start: 2024-05-01

## 2023-11-17 RX ORDER — SODIUM CHLORIDE 0.9 % (FLUSH) 0.9 %
10 SYRINGE (ML) INJECTION
Status: DISCONTINUED | OUTPATIENT
Start: 2023-11-17 | End: 2023-11-17 | Stop reason: HOSPADM

## 2023-11-17 RX ORDER — DIPHENHYDRAMINE HYDROCHLORIDE 50 MG/ML
50 INJECTION INTRAMUSCULAR; INTRAVENOUS ONCE AS NEEDED
OUTPATIENT
Start: 2024-05-01

## 2023-11-17 RX ORDER — DIPHENHYDRAMINE HYDROCHLORIDE 50 MG/ML
50 INJECTION INTRAMUSCULAR; INTRAVENOUS ONCE AS NEEDED
Status: DISCONTINUED | OUTPATIENT
Start: 2023-11-17 | End: 2023-11-17 | Stop reason: HOSPADM

## 2023-11-17 RX ORDER — FAMOTIDINE 10 MG/ML
20 INJECTION INTRAVENOUS
Status: COMPLETED | OUTPATIENT
Start: 2023-11-17 | End: 2023-11-17

## 2023-11-17 RX ORDER — MEPERIDINE HYDROCHLORIDE 50 MG/ML
25 INJECTION INTRAMUSCULAR; INTRAVENOUS; SUBCUTANEOUS
OUTPATIENT
Start: 2024-05-01 | End: 2024-05-02

## 2023-11-17 RX ORDER — MEPERIDINE HYDROCHLORIDE 50 MG/ML
25 INJECTION INTRAMUSCULAR; INTRAVENOUS; SUBCUTANEOUS
Status: DISCONTINUED | OUTPATIENT
Start: 2023-11-17 | End: 2023-11-17 | Stop reason: HOSPADM

## 2023-11-17 RX ORDER — EPINEPHRINE 0.3 MG/.3ML
0.3 INJECTION SUBCUTANEOUS ONCE AS NEEDED
Status: DISCONTINUED | OUTPATIENT
Start: 2023-11-17 | End: 2023-11-17 | Stop reason: HOSPADM

## 2023-11-17 RX ORDER — EPINEPHRINE 0.3 MG/.3ML
0.3 INJECTION SUBCUTANEOUS ONCE AS NEEDED
OUTPATIENT
Start: 2024-05-01

## 2023-11-17 RX ORDER — SODIUM CHLORIDE 0.9 % (FLUSH) 0.9 %
10 SYRINGE (ML) INJECTION
OUTPATIENT
Start: 2024-05-01

## 2023-11-17 RX ORDER — FAMOTIDINE 10 MG/ML
20 INJECTION INTRAVENOUS
OUTPATIENT
Start: 2024-05-01

## 2023-11-17 RX ORDER — ACETAMINOPHEN 325 MG/1
650 TABLET ORAL
Status: COMPLETED | OUTPATIENT
Start: 2023-11-17 | End: 2023-11-17

## 2023-11-17 RX ORDER — HEPARIN 100 UNIT/ML
500 SYRINGE INTRAVENOUS
OUTPATIENT
Start: 2024-05-01

## 2023-11-17 RX ADMIN — SODIUM CHLORIDE 1000 MG: 9 INJECTION, SOLUTION INTRAVENOUS at 08:11

## 2023-11-17 RX ADMIN — SODIUM CHLORIDE: 9 INJECTION, SOLUTION INTRAVENOUS at 07:11

## 2023-11-17 RX ADMIN — FAMOTIDINE 20 MG: 10 INJECTION INTRAVENOUS at 07:11

## 2023-11-17 RX ADMIN — ACETAMINOPHEN 650 MG: 325 TABLET ORAL at 07:11

## 2023-11-17 RX ADMIN — DIPHENHYDRAMINE HYDROCHLORIDE 50 MG: 50 INJECTION, SOLUTION INTRAMUSCULAR; INTRAVENOUS at 07:11

## 2023-11-17 NOTE — NURSING
0700  Pt here for Ruxience infusion, accompanied by daughter, no new complaints or concerns and reports tolerating prior treatment w/out issue; discussed treatment plan for today, all questions answered and pt agrees to proceed

## 2023-11-17 NOTE — PLAN OF CARE
1207  Infusion completed, pt tolerated; pt instructed to remain well hydrated; discussed post infusion reaction, how to treat, when to contact MD, when to report to ED; pt instructed to schedule next infusion not later than six months and reason for same; pt and daughter verbalized understanding of all discussed

## 2023-11-29 ENCOUNTER — OFFICE VISIT (OUTPATIENT)
Dept: VASCULAR SURGERY | Facility: CLINIC | Age: 78
End: 2023-11-29
Payer: MEDICARE

## 2023-11-29 ENCOUNTER — HOSPITAL ENCOUNTER (OUTPATIENT)
Dept: CARDIOLOGY | Facility: HOSPITAL | Age: 78
Discharge: HOME | End: 2023-11-29
Attending: SURGERY
Payer: MEDICARE

## 2023-11-29 VITALS
WEIGHT: 129.31 LBS | SYSTOLIC BLOOD PRESSURE: 121 MMHG | HEIGHT: 61 IN | HEART RATE: 68 BPM | DIASTOLIC BLOOD PRESSURE: 78 MMHG | BODY MASS INDEX: 24.41 KG/M2

## 2023-11-29 DIAGNOSIS — N18.6 ESRD (END STAGE RENAL DISEASE): Primary | ICD-10-CM

## 2023-11-29 DIAGNOSIS — N18.6 ESRD (END STAGE RENAL DISEASE): ICD-10-CM

## 2023-11-29 LAB
HD NATIVE ARTERY VESSEL: NORMAL
HD OUTFLOW VEIN VESSEL: NORMAL
LEFT DIST ANA PSV: 166 CM/S
LEFT DIST GRAFT PSV: 104 CM/S
LEFT INFLOW PSV: 296 CM/S
LEFT MID GRAFT PSV: 149 CM/S
LEFT OUTFLOW PSV: 186 CM/S
LEFT PROX ANA PSV: 392 CM/S
LEFT PROX GRAFT PSV: 346 CM/S
LEFT VOLUME FLOW PSV: 1489 ML/MIN

## 2023-11-29 PROCEDURE — 93990 DOPPLER FLOW TESTING: CPT | Mod: 26,,, | Performed by: SURGERY

## 2023-11-29 PROCEDURE — 93990 CV US HEMODIALYSIS ACCESS (CUPID ONLY): ICD-10-PCS | Mod: 26,,, | Performed by: SURGERY

## 2023-11-29 PROCEDURE — 3078F PR MOST RECENT DIASTOLIC BLOOD PRESSURE < 80 MM HG: ICD-10-PCS | Mod: CPTII,S$GLB,, | Performed by: SURGERY

## 2023-11-29 PROCEDURE — 99999 PR PBB SHADOW E&M-EST. PATIENT-LVL II: ICD-10-PCS | Mod: PBBFAC,,, | Performed by: SURGERY

## 2023-11-29 PROCEDURE — 93990 DOPPLER FLOW TESTING: CPT | Mod: TC

## 2023-11-29 PROCEDURE — 1101F PT FALLS ASSESS-DOCD LE1/YR: CPT | Mod: CPTII,S$GLB,, | Performed by: SURGERY

## 2023-11-29 PROCEDURE — 1126F PR PAIN SEVERITY QUANTIFIED, NO PAIN PRESENT: ICD-10-PCS | Mod: CPTII,S$GLB,, | Performed by: SURGERY

## 2023-11-29 PROCEDURE — 1101F PR PT FALLS ASSESS DOC 0-1 FALLS W/OUT INJ PAST YR: ICD-10-PCS | Mod: CPTII,S$GLB,, | Performed by: SURGERY

## 2023-11-29 PROCEDURE — 99024 POSTOP FOLLOW-UP VISIT: CPT | Mod: S$GLB,,, | Performed by: SURGERY

## 2023-11-29 PROCEDURE — 1126F AMNT PAIN NOTED NONE PRSNT: CPT | Mod: CPTII,S$GLB,, | Performed by: SURGERY

## 2023-11-29 PROCEDURE — 3074F SYST BP LT 130 MM HG: CPT | Mod: CPTII,S$GLB,, | Performed by: SURGERY

## 2023-11-29 PROCEDURE — 3288F FALL RISK ASSESSMENT DOCD: CPT | Mod: CPTII,S$GLB,, | Performed by: SURGERY

## 2023-11-29 PROCEDURE — 3078F DIAST BP <80 MM HG: CPT | Mod: CPTII,S$GLB,, | Performed by: SURGERY

## 2023-11-29 PROCEDURE — 99999 PR PBB SHADOW E&M-EST. PATIENT-LVL II: CPT | Mod: PBBFAC,,, | Performed by: SURGERY

## 2023-11-29 PROCEDURE — 99024 PR POST-OP FOLLOW-UP VISIT: ICD-10-PCS | Mod: S$GLB,,, | Performed by: SURGERY

## 2023-11-29 PROCEDURE — 3074F PR MOST RECENT SYSTOLIC BLOOD PRESSURE < 130 MM HG: ICD-10-PCS | Mod: CPTII,S$GLB,, | Performed by: SURGERY

## 2023-11-29 PROCEDURE — 3288F PR FALLS RISK ASSESSMENT DOCUMENTED: ICD-10-PCS | Mod: CPTII,S$GLB,, | Performed by: SURGERY

## 2023-11-29 NOTE — PROGRESS NOTES
Mrs. Shrestha is status post left arm brachial to axillary AV graft on 9/7/2023      Today's duplex shown below  looks good with adequate flows.  Patient continues to have some diffuse edema of her left upper extremity but incisions are well healed and patient denies any pain.  There is a strong thrill over the graft and a palpable radial pulse she has no hand symptoms.    Patient reports using her left arm AV graft for dialysis without problems with the past month.  She is now been using the large needles in no longer needs her catheter.  She is on Eliquis for AFib which she has been taking  Hold Eliquis starting Friday, last dose Thursday, and  RTC on Monday for left chest wall TDC removal        EXAMINATION:  US HEMODIALYSIS ACCESS   Results in cm/sec:     Left brachial artery inflow velocity: 315     Proximal anastomosis: 429     Proximal AV graft velocity: 412     Mid AV graft velocity: 117     Distal AV graft: 101     Distal anastomosis: 183     Axillary vein (outflow): 146     Mid AV graft flow volume: 844 mL/minutes     There is diffuse subcutaneous soft tissue edema within the left upper arm with no focal fluid collections identified.     Impression:     Left upper extremity AV graft is patent with a mid AV graft flow volume of 844 mL/minutes.     Diffuse subcutaneous soft tissue edema within the left upper arm.

## 2023-12-04 ENCOUNTER — OFFICE VISIT (OUTPATIENT)
Dept: VASCULAR SURGERY | Facility: CLINIC | Age: 78
End: 2023-12-04
Payer: MEDICARE

## 2023-12-04 VITALS
SYSTOLIC BLOOD PRESSURE: 138 MMHG | DIASTOLIC BLOOD PRESSURE: 83 MMHG | WEIGHT: 129.5 LBS | HEIGHT: 61 IN | BODY MASS INDEX: 24.45 KG/M2 | HEART RATE: 79 BPM

## 2023-12-04 DIAGNOSIS — Z99.2 DEPENDENCE ON HEMODIALYSIS: Primary | ICD-10-CM

## 2023-12-04 PROCEDURE — 1101F PR PT FALLS ASSESS DOC 0-1 FALLS W/OUT INJ PAST YR: ICD-10-PCS | Mod: CPTII,S$GLB,, | Performed by: SURGERY

## 2023-12-04 PROCEDURE — 1126F AMNT PAIN NOTED NONE PRSNT: CPT | Mod: CPTII,S$GLB,, | Performed by: SURGERY

## 2023-12-04 PROCEDURE — 99024 POSTOP FOLLOW-UP VISIT: CPT | Mod: S$GLB,,, | Performed by: SURGERY

## 2023-12-04 PROCEDURE — 99999 PR PBB SHADOW E&M-EST. PATIENT-LVL II: CPT | Mod: PBBFAC,,, | Performed by: SURGERY

## 2023-12-04 PROCEDURE — 3075F SYST BP GE 130 - 139MM HG: CPT | Mod: CPTII,S$GLB,, | Performed by: SURGERY

## 2023-12-04 PROCEDURE — 3288F FALL RISK ASSESSMENT DOCD: CPT | Mod: CPTII,S$GLB,, | Performed by: SURGERY

## 2023-12-04 PROCEDURE — 3075F PR MOST RECENT SYSTOLIC BLOOD PRESS GE 130-139MM HG: ICD-10-PCS | Mod: CPTII,S$GLB,, | Performed by: SURGERY

## 2023-12-04 PROCEDURE — 3288F PR FALLS RISK ASSESSMENT DOCUMENTED: ICD-10-PCS | Mod: CPTII,S$GLB,, | Performed by: SURGERY

## 2023-12-04 PROCEDURE — 3079F PR MOST RECENT DIASTOLIC BLOOD PRESSURE 80-89 MM HG: ICD-10-PCS | Mod: CPTII,S$GLB,, | Performed by: SURGERY

## 2023-12-04 PROCEDURE — 1101F PT FALLS ASSESS-DOCD LE1/YR: CPT | Mod: CPTII,S$GLB,, | Performed by: SURGERY

## 2023-12-04 PROCEDURE — 99024 PR POST-OP FOLLOW-UP VISIT: ICD-10-PCS | Mod: S$GLB,,, | Performed by: SURGERY

## 2023-12-04 PROCEDURE — 3079F DIAST BP 80-89 MM HG: CPT | Mod: CPTII,S$GLB,, | Performed by: SURGERY

## 2023-12-04 PROCEDURE — 99999 PR PBB SHADOW E&M-EST. PATIENT-LVL II: ICD-10-PCS | Mod: PBBFAC,,, | Performed by: SURGERY

## 2023-12-04 PROCEDURE — 1126F PR PAIN SEVERITY QUANTIFIED, NO PAIN PRESENT: ICD-10-PCS | Mod: CPTII,S$GLB,, | Performed by: SURGERY

## 2023-12-04 NOTE — PROGRESS NOTES
Tunneled Central Venous Catheter Removal  Vascular Surgery      Indication: ESRD on hemodialysis via long term dialysis access    Procedure:  The patient was placed supine on the procedure table.  A time out was performed.  The patient was prepped and draped in sterile fashion. 2% Lidocaine with Epinephrine was used for local anesthetic. A blunt hemostat was used to dissect the exit sheath and fibrin sheath from the catheter cuff. After the cuff was freed, the catheter was pulled and pressure was applied to the venotomy. Once hemostasis was achieved, a pressure dressing was applied.    Complications/Comments:  None    Estimated Blood Loss: <5 cc    Discharge instructions given: Yes       RTC in 6 months with RUE AVG duplex surveillance

## 2024-01-12 DIAGNOSIS — S83.241D ACUTE MEDIAL MENISCUS TEAR OF RIGHT KNEE, SUBSEQUENT ENCOUNTER: ICD-10-CM

## 2024-01-12 DIAGNOSIS — M94.261 CHONDROMALACIA OF RIGHT KNEE: ICD-10-CM

## 2024-01-12 RX ORDER — METHOCARBAMOL 500 MG/1
500 TABLET, FILM COATED ORAL
Qty: 60 TABLET | Refills: 0 | Status: SHIPPED | OUTPATIENT
Start: 2024-01-12 | End: 2024-02-16

## 2024-01-12 NOTE — TELEPHONE ENCOUNTER
No care due was identified.  [facility] Embedded Care Due Messages. Reference number: [id number].   1/12/2024 2:18:07 PM CST

## 2024-01-23 ENCOUNTER — OFFICE VISIT (OUTPATIENT)
Dept: FAMILY MEDICINE | Facility: CLINIC | Age: 79
End: 2024-01-23
Payer: MEDICARE

## 2024-01-23 VITALS
SYSTOLIC BLOOD PRESSURE: 130 MMHG | DIASTOLIC BLOOD PRESSURE: 70 MMHG | WEIGHT: 133.19 LBS | OXYGEN SATURATION: 99 % | RESPIRATION RATE: 16 BRPM | TEMPERATURE: 98 F | HEART RATE: 73 BPM | BODY MASS INDEX: 25.14 KG/M2 | HEIGHT: 61 IN

## 2024-01-23 DIAGNOSIS — I70.1 ATHEROSCLEROSIS OF RENAL ARTERY: ICD-10-CM

## 2024-01-23 DIAGNOSIS — N25.81 SECONDARY HYPERPARATHYROIDISM OF RENAL ORIGIN: ICD-10-CM

## 2024-01-23 DIAGNOSIS — D84.9 IMMUNOSUPPRESSION: ICD-10-CM

## 2024-01-23 DIAGNOSIS — R22.1 LOCALIZED SWELLING, MASS AND LUMP, NECK: ICD-10-CM

## 2024-01-23 DIAGNOSIS — Z99.2 DEPENDENCE ON HEMODIALYSIS: ICD-10-CM

## 2024-01-23 DIAGNOSIS — M31.7 MICROSCOPIC POLYANGIITIS: ICD-10-CM

## 2024-01-23 DIAGNOSIS — D84.821 IMMUNOSUPPRESSION DUE TO DRUG THERAPY: ICD-10-CM

## 2024-01-23 DIAGNOSIS — E44.0 MODERATE MALNUTRITION: ICD-10-CM

## 2024-01-23 DIAGNOSIS — N18.6 ESRD (END STAGE RENAL DISEASE): ICD-10-CM

## 2024-01-23 DIAGNOSIS — I82.90 ACUTE DEEP VEIN THROMBOSIS (DVT) OF NON-EXTREMITY VEIN: ICD-10-CM

## 2024-01-23 DIAGNOSIS — I50.32 CHRONIC DIASTOLIC HEART FAILURE: Primary | ICD-10-CM

## 2024-01-23 DIAGNOSIS — Z79.899 IMMUNOSUPPRESSION DUE TO DRUG THERAPY: ICD-10-CM

## 2024-01-23 DIAGNOSIS — Z86.718 CURRENT LONG-TERM USE OF ANTICOAGULANT MEDICATION WITH HISTORY OF DEEP VENOUS THROMBOSIS (DVT): ICD-10-CM

## 2024-01-23 DIAGNOSIS — Z79.01 CURRENT LONG-TERM USE OF ANTICOAGULANT MEDICATION WITH HISTORY OF DEEP VENOUS THROMBOSIS (DVT): ICD-10-CM

## 2024-01-23 PROBLEM — F15.20 OTHER STIMULANT DEPENDENCE, UNCOMPLICATED: Status: ACTIVE | Noted: 2024-01-23

## 2024-01-23 PROBLEM — I77.82: Status: ACTIVE | Noted: 2022-10-26

## 2024-01-23 PROCEDURE — 99999 PR PBB SHADOW E&M-EST. PATIENT-LVL IV: CPT | Mod: PBBFAC,,, | Performed by: INTERNAL MEDICINE

## 2024-01-23 PROCEDURE — 99214 OFFICE O/P EST MOD 30 MIN: CPT | Mod: S$GLB,,, | Performed by: INTERNAL MEDICINE
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
## 2024-01-24 RX ORDER — ATOVAQUONE 750 MG/5ML
1500 SUSPENSION ORAL DAILY
Qty: 300 ML | Refills: 3 | Status: SHIPPED | OUTPATIENT
Start: 2024-01-24 | End: 2024-05-10 | Stop reason: SDUPTHER

## 2024-01-24 NOTE — TELEPHONE ENCOUNTER
----- Message from Kassy Valdez sent at 1/24/2024  1:48 PM CST -----  Type: RX Refill Request    Who Called: pt     Have you contacted your pharmacy:    Refill or New Rx:atovaquone (MEPRON) 750 mg/5 mL Susp oral liquid      RX Name and Strength:    How is the patient currently taking it? (ex. 1XDay):    Is this a 30 day or 90 day RX:    Preferred Pharmacy with phone number:  [facility] [address], [place] - [address]  [address] [place] [address]  Phone: [phone] Fax: [phone]        Local or Mail Order:    Ordering Provider:    Would the patient rather a call back or a response via [facility]? call    Best Call Back Number:[phone] (home)       Additional Information:

## 2024-01-31 ENCOUNTER — HOSPITAL ENCOUNTER (OUTPATIENT)
Dept: RADIOLOGY | Facility: HOSPITAL | Age: 79
Discharge: HOME | End: 2024-01-31
Attending: INTERNAL MEDICINE
Payer: MEDICARE

## 2024-01-31 DIAGNOSIS — R22.1 LOCALIZED SWELLING, MASS AND LUMP, NECK: ICD-10-CM

## 2024-01-31 PROCEDURE — 76536 US EXAM OF HEAD AND NECK: CPT | Mod: TC

## 2024-01-31 PROCEDURE — 76536 US EXAM OF HEAD AND NECK: CPT | Mod: 26,,, | Performed by: STUDENT IN AN ORGANIZED HEALTH CARE EDUCATION/TRAINING PROGRAM

## 2024-02-16 ENCOUNTER — LAB VISIT (OUTPATIENT)
Dept: LAB | Facility: HOSPITAL | Age: 79
End: 2024-02-16
Attending: INTERNAL MEDICINE
Payer: MEDICARE

## 2024-02-16 ENCOUNTER — OFFICE VISIT (OUTPATIENT)
Dept: RHEUMATOLOGY | Facility: CLINIC | Age: 79
End: 2024-02-16
Payer: MEDICARE

## 2024-02-16 VITALS
WEIGHT: 137.81 LBS | SYSTOLIC BLOOD PRESSURE: 144 MMHG | BODY MASS INDEX: 26.02 KG/M2 | DIASTOLIC BLOOD PRESSURE: 67 MMHG | HEIGHT: 61 IN | HEART RATE: 64 BPM

## 2024-02-16 DIAGNOSIS — I77.82 ANTINEUTROPHILIC CYTOPLASMIC ANTIBODY (ANCA) VASCULITIS: Primary | ICD-10-CM

## 2024-02-16 DIAGNOSIS — D84.821 IMMUNOSUPPRESSION DUE TO DRUG THERAPY: ICD-10-CM

## 2024-02-16 DIAGNOSIS — Z79.899 IMMUNOSUPPRESSION DUE TO DRUG THERAPY: ICD-10-CM

## 2024-02-16 DIAGNOSIS — M31.7 MICROSCOPIC POLYANGIITIS: ICD-10-CM

## 2024-02-16 DIAGNOSIS — M79.10 MYALGIA: ICD-10-CM

## 2024-02-16 DIAGNOSIS — N05.8 PRIMARY PAUCI-IMMUNE NECROTIZING AND CRESCENTIC GLOMERULONEPHRITIS: ICD-10-CM

## 2024-02-16 DIAGNOSIS — N05.7 PRIMARY PAUCI-IMMUNE NECROTIZING AND CRESCENTIC GLOMERULONEPHRITIS: ICD-10-CM

## 2024-02-16 LAB
ALBUMIN SERPL BCP-MCNC: 3.8 G/DL (ref 3.5–5.2)
ALP SERPL-CCNC: 81 U/L (ref 55–135)
ALT SERPL W/O P-5'-P-CCNC: 37 U/L (ref 10–44)
ANION GAP SERPL CALC-SCNC: 15 MMOL/L (ref 8–16)
AST SERPL-CCNC: 29 U/L (ref 10–40)
BASOPHILS # BLD AUTO: 0.05 K/UL (ref 0–0.2)
BASOPHILS NFR BLD: 0.4 % (ref 0–1.9)
BILIRUB SERPL-MCNC: 0.5 MG/DL (ref 0.1–1)
BUN SERPL-MCNC: 58 MG/DL (ref 8–23)
C3 SERPL-MCNC: 155 MG/DL (ref 50–180)
C4 SERPL-MCNC: 41 MG/DL (ref 11–44)
CALCIUM SERPL-MCNC: 10.4 MG/DL (ref 8.7–10.5)
CHLORIDE SERPL-SCNC: 97 MMOL/L (ref 95–110)
CO2 SERPL-SCNC: 30 MMOL/L (ref 23–29)
CREAT SERPL-MCNC: 3.8 MG/DL (ref 0.5–1.4)
CRP SERPL-MCNC: 0.6 MG/L (ref 0–8.2)
DIFFERENTIAL METHOD BLD: ABNORMAL
EOSINOPHIL # BLD AUTO: 0 K/UL (ref 0–0.5)
EOSINOPHIL NFR BLD: 0.3 % (ref 0–8)
ERYTHROCYTE [DISTWIDTH] IN BLOOD BY AUTOMATED COUNT: 15.9 % (ref 11.5–14.5)
ERYTHROCYTE [SEDIMENTATION RATE] IN BLOOD BY PHOTOMETRIC METHOD: 41 MM/HR (ref 0–36)
EST. GFR  (NO RACE VARIABLE): 11.8 ML/MIN/1.73 M^2
GLUCOSE SERPL-MCNC: 107 MG/DL (ref 70–110)
HCT VFR BLD AUTO: 42.3 % (ref 37–48.5)
HGB BLD-MCNC: 12.8 G/DL (ref 12–16)
IMM GRANULOCYTES # BLD AUTO: 0.06 K/UL (ref 0–0.04)
IMM GRANULOCYTES NFR BLD AUTO: 0.5 % (ref 0–0.5)
LYMPHOCYTES # BLD AUTO: 2 K/UL (ref 1–4.8)
LYMPHOCYTES NFR BLD: 16 % (ref 18–48)
MCH RBC QN AUTO: 29.1 PG (ref 27–31)
MCHC RBC AUTO-ENTMCNC: 30.3 G/DL (ref 32–36)
MCV RBC AUTO: 96 FL (ref 82–98)
MONOCYTES # BLD AUTO: 1 K/UL (ref 0.3–1)
MONOCYTES NFR BLD: 8 % (ref 4–15)
NEUTROPHILS # BLD AUTO: 9.5 K/UL (ref 1.8–7.7)
NEUTROPHILS NFR BLD: 74.8 % (ref 38–73)
NRBC BLD-RTO: 0 /100 WBC
PLATELET # BLD AUTO: 222 K/UL (ref 150–450)
PMV BLD AUTO: 10.4 FL (ref 9.2–12.9)
POTASSIUM SERPL-SCNC: 3.9 MMOL/L (ref 3.5–5.1)
PROT SERPL-MCNC: 7.3 G/DL (ref 6–8.4)
RBC # BLD AUTO: 4.4 M/UL (ref 4–5.4)
SODIUM SERPL-SCNC: 142 MMOL/L (ref 136–145)
WBC # BLD AUTO: 12.7 K/UL (ref 3.9–12.7)

## 2024-02-16 PROCEDURE — 85652 RBC SED RATE AUTOMATED: CPT | Performed by: INTERNAL MEDICINE

## 2024-02-16 PROCEDURE — 86140 C-REACTIVE PROTEIN: CPT | Performed by: INTERNAL MEDICINE

## 2024-02-16 PROCEDURE — 99214 OFFICE O/P EST MOD 30 MIN: CPT | Mod: S$GLB,,, | Performed by: INTERNAL MEDICINE

## 2024-02-16 PROCEDURE — 86225 DNA ANTIBODY NATIVE: CPT | Performed by: INTERNAL MEDICINE

## 2024-02-16 PROCEDURE — 86160 COMPLEMENT ANTIGEN: CPT | Mod: 59 | Performed by: INTERNAL MEDICINE

## 2024-02-16 PROCEDURE — 99999 PR PBB SHADOW E&M-EST. PATIENT-LVL III: CPT | Mod: PBBFAC,,, | Performed by: INTERNAL MEDICINE

## 2024-02-16 PROCEDURE — 86160 COMPLEMENT ANTIGEN: CPT | Performed by: INTERNAL MEDICINE

## 2024-02-16 PROCEDURE — 80053 COMPREHEN METABOLIC PANEL: CPT | Performed by: INTERNAL MEDICINE

## 2024-02-16 PROCEDURE — 85025 COMPLETE CBC W/AUTO DIFF WBC: CPT | Performed by: INTERNAL MEDICINE

## 2024-02-16 PROCEDURE — 36415 COLL VENOUS BLD VENIPUNCTURE: CPT | Performed by: INTERNAL MEDICINE

## 2024-02-16 RX ORDER — PREDNISONE 2.5 MG/1
5 TABLET ORAL DAILY
Qty: 60 TABLET | Refills: 2 | Status: SHIPPED | OUTPATIENT
Start: 2024-02-16 | End: 2024-04-09

## 2024-02-16 RX ORDER — CYCLOBENZAPRINE HCL 5 MG
5 TABLET ORAL EVERY 8 HOURS PRN
Qty: 20 TABLET | Refills: 2 | Status: SHIPPED | OUTPATIENT
Start: 2024-02-16 | End: 2024-05-08

## 2024-02-16 ASSESSMENT — ROUTINE ASSESSMENT OF PATIENT INDEX DATA (RAPID3)
TOTAL RAPID3 SCORE: 5.33
PAIN SCORE: 8
FATIGUE SCORE: 6
PSYCHOLOGICAL DISTRESS SCORE: 2.2
PATIENT GLOBAL ASSESSMENT SCORE: 7
MDHAQ FUNCTION SCORE: 0.3

## 2024-02-16 NOTE — PROGRESS NOTES
Subjective:       Patient ID: Kristin Goodman is a 78 y.o. female.    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   3)  CHANGES SUGGESTIVE OF FOCAL ACUTE PYELONEPHRITIS.   4)  MILD-TO-MODERATE ARTERIONEPHROSCLEROSIS      Oct 2022 Renal bx showed changes of both SLE and MPO-ANCA vasculitis      Got rituximab Nov 17, 2023     Currently on prednisone 10 mg/d    Still hemodialysis twice / week [place], Tuesday and Saturday; now at [facility] so has new nephrologist    C/o pain LUE intermittently; sometimes feet too  Tylenol partially helpful  Sometimes pain is severe  Methocarbamol helps a little; makes her sleepy    Review of Systems   Constitutional:  Positive for fatigue. Negative for fever and unexpected weight change.   HENT:  Negative for mouth sores and trouble swallowing.         Dry mouth   Eyes:  Positive for redness.        Dry eyes   Respiratory:  Negative for cough and shortness of breath.    Cardiovascular:  Negative for chest pain.   Gastrointestinal:  Negative for abdominal pain, constipation and diarrhea.   Genitourinary:  Negative for dysuria and genital sores.   Skin:  Negative for color change and rash.   Neurological:  Negative for headaches.   Hematological:  Negative for adenopathy. Does not bruise/bleed easily.   Psychiatric/Behavioral:  Negative for sleep  "disturbance.          Objective:   BP (!) 144/67 (BP Location: Right arm, Patient Position: Sitting, BP Method: Medium (Automatic))   Pulse 64   Ht 5' 1" (1.549 m)   Wt 62.5 kg (137 lb 12.6 oz)   BMI 26.03 kg/m²      Physical Exam   Eyes: Conjunctivae are normal.   Cardiovascular: Normal rate and regular rhythm.   Pulmonary/Chest: She has no wheezes. She has no rales.   Lymphadenopathy:     She has no cervical adenopathy.   Skin: No rash noted.   No ischemic lesions but L hand cool and sl violaceous compared to R          Assessment:       1. Antineutrophilic cytoplasmic antibody (ANCA) vasculitis    2. Microscopic polyangiitis with crescentic GN    3. Myalgia    4. 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

## 2024-02-16 NOTE — ASSESSMENT & PLAN NOTE
Received rituximab without complications; currently clinically stable though intermittent muscle or joint pains    Will get labs today and reduce prednisone to 5 mg/d  Will plan follow up rituximab May; see me in April

## 2024-02-19 LAB — DSDNA AB SER-ACNC: NORMAL [IU]/ML

## 2024-02-25 NOTE — PLAN OF CARE
Problem: Adult Inpatient Plan of Care  Goal: Plan of Care Review  Outcome: Ongoing, Progressing  Goal: Patient-Specific Goal (Individualized)  Outcome: Ongoing, Progressing     Problem: Glycemic Control Impaired (Sepsis/Septic Shock)  Goal: Blood Glucose Level Within Desired Range  Outcome: Ongoing, Progressing      Alert-The patient is alert, awake and responds to voice. The patient is oriented to time, place, and person. The triage nurse is able to obtain subjective information.

## 2024-04-08 DIAGNOSIS — I77.82 ANTINEUTROPHILIC CYTOPLASMIC ANTIBODY (ANCA) VASCULITIS: ICD-10-CM

## 2024-04-09 RX ORDER — PREDNISONE 2.5 MG/1
7.5 TABLET ORAL
Qty: 90 TABLET | Refills: 0 | Status: SHIPPED | OUTPATIENT
Start: 2024-04-09 | End: 2024-05-03

## 2024-04-12 ENCOUNTER — HOSPITAL ENCOUNTER (OUTPATIENT)
Dept: RADIOLOGY | Facility: HOSPITAL | Age: 79
Discharge: HOME | End: 2024-04-12
Attending: INTERNAL MEDICINE
Payer: MEDICARE

## 2024-04-12 DIAGNOSIS — N18.6 ESRD (END STAGE RENAL DISEASE): ICD-10-CM

## 2024-04-12 PROCEDURE — 93990 DOPPLER FLOW TESTING: CPT | Mod: 26,,, | Performed by: RADIOLOGY

## 2024-04-12 PROCEDURE — 93990 DOPPLER FLOW TESTING: CPT | Mod: TC

## 2024-04-17 ENCOUNTER — OFFICE VISIT (OUTPATIENT)
Dept: VASCULAR SURGERY | Facility: CLINIC | Age: 79
End: 2024-04-17
Payer: MEDICARE

## 2024-04-17 VITALS
WEIGHT: 138.56 LBS | DIASTOLIC BLOOD PRESSURE: 75 MMHG | BODY MASS INDEX: 26.16 KG/M2 | HEIGHT: 61 IN | HEART RATE: 68 BPM | SYSTOLIC BLOOD PRESSURE: 121 MMHG

## 2024-04-17 DIAGNOSIS — N18.6 ESRD (END STAGE RENAL DISEASE): Primary | ICD-10-CM

## 2024-04-17 PROCEDURE — 3288F FALL RISK ASSESSMENT DOCD: CPT | Mod: CPTII,S$GLB,, | Performed by: SURGERY

## 2024-04-17 PROCEDURE — 3074F SYST BP LT 130 MM HG: CPT | Mod: CPTII,S$GLB,, | Performed by: SURGERY

## 2024-04-17 PROCEDURE — 1101F PT FALLS ASSESS-DOCD LE1/YR: CPT | Mod: CPTII,S$GLB,, | Performed by: SURGERY

## 2024-04-17 PROCEDURE — 99215 OFFICE O/P EST HI 40 MIN: CPT | Mod: S$GLB,,, | Performed by: SURGERY

## 2024-04-17 PROCEDURE — 1126F AMNT PAIN NOTED NONE PRSNT: CPT | Mod: CPTII,S$GLB,, | Performed by: SURGERY

## 2024-04-17 PROCEDURE — 1159F MED LIST DOCD IN RCRD: CPT | Mod: CPTII,S$GLB,, | Performed by: SURGERY

## 2024-04-17 PROCEDURE — 99999 PR PBB SHADOW E&M-EST. PATIENT-LVL III: CPT | Mod: PBBFAC,,, | Performed by: SURGERY

## 2024-04-17 PROCEDURE — 3078F DIAST BP <80 MM HG: CPT | Mod: CPTII,S$GLB,, | Performed by: SURGERY

## 2024-04-17 NOTE — PROGRESS NOTES
Mrs. Shrestha is status post left arm brachial to axillary AV graft on 9/7/2023         She was referred for evaluation of her AV graft because of high venous pressures and slower flows recently.  Of note previously patient had tunneled dialysis catheter is in bilateral internal jugular and history of left subclavian vein thrombus.       Her hemodialysis access duplex a shown below which does not show significant outflow stenosis.    On exam Her graft has a decent thrill.  But she does have some left arm swelling.  patient does have some chest wall collaterals particularly on her left side which were not previously appreciated.  It was possible with the she has some central venous stenosis      Plan  L arm fistulogram - April 25  She is on Eliquis for Afib.  Hold 2 days prior , last dose Monday  Rhematologist Labs scheduled on AM of surgery - will also draw CBC, BMP, type and screen at that time

## 2024-04-23 ENCOUNTER — HOSPITAL ENCOUNTER (OUTPATIENT)
Dept: PREADMISSION TESTING | Facility: HOSPITAL | Age: 79
Discharge: HOME | End: 2024-04-23
Attending: SURGERY
Payer: MEDICARE

## 2024-04-23 VITALS — HEIGHT: 61 IN | WEIGHT: 138 LBS | BODY MASS INDEX: 26.06 KG/M2

## 2024-04-23 NOTE — PLAN OF CARE
RN PHONE PREOP 4/23/2024 Pt. Daughter did phone preop Roro and instructed by  to stop Eliquis on 4/22/2024

## 2024-04-25 ENCOUNTER — TELEPHONE (OUTPATIENT)
Dept: VASCULAR SURGERY | Facility: CLINIC | Age: 79
End: 2024-04-25
Payer: MEDICARE

## 2024-04-25 ENCOUNTER — HOSPITAL ENCOUNTER (OUTPATIENT)
Dept: INTERVENTIONAL RADIOLOGY/VASCULAR | Facility: HOSPITAL | Age: 79
Discharge: HOME | End: 2024-04-25
Attending: SURGERY
Payer: MEDICARE

## 2024-04-25 VITALS
OXYGEN SATURATION: 100 % | SYSTOLIC BLOOD PRESSURE: 142 MMHG | HEART RATE: 63 BPM | RESPIRATION RATE: 20 BRPM | TEMPERATURE: 98 F | DIASTOLIC BLOOD PRESSURE: 68 MMHG

## 2024-04-25 DIAGNOSIS — T82.858D ARTERIOVENOUS FISTULA STENOSIS, SUBSEQUENT ENCOUNTER: ICD-10-CM

## 2024-04-25 DIAGNOSIS — N18.6 ESRD (END STAGE RENAL DISEASE): ICD-10-CM

## 2024-04-25 DIAGNOSIS — S25.302A: Primary | ICD-10-CM

## 2024-04-25 LAB
ABO + RH BLD: ABNORMAL
BLD GP AB SCN CELLS X3 SERPL QL: ABNORMAL
BLOOD GROUP ANTIBODIES SERPL: NORMAL
SPECIMEN OUTDATE: ABNORMAL

## 2024-04-25 PROCEDURE — 36901 INTRO CATH DIALYSIS CIRCUIT: CPT | Performed by: SURGERY

## 2024-04-25 PROCEDURE — 99152 MOD SED SAME PHYS/QHP 5/>YRS: CPT

## 2024-04-25 PROCEDURE — C1769 GUIDE WIRE: HCPCS

## 2024-04-25 PROCEDURE — 99152 MOD SED SAME PHYS/QHP 5/>YRS: CPT | Mod: ,,, | Performed by: SURGERY

## 2024-04-25 PROCEDURE — 86901 BLOOD TYPING SEROLOGIC RH(D): CPT | Performed by: NURSE PRACTITIONER

## 2024-04-25 PROCEDURE — 86870 RBC ANTIBODY IDENTIFICATION: CPT | Performed by: NURSE PRACTITIONER

## 2024-04-25 PROCEDURE — 25000003 PHARM REV CODE 250: Performed by: SURGERY

## 2024-04-25 PROCEDURE — C1894 INTRO/SHEATH, NON-LASER: HCPCS

## 2024-04-25 PROCEDURE — 63600175 PHARM REV CODE 636 W HCPCS: Mod: JZ,JG | Performed by: SURGERY

## 2024-04-25 PROCEDURE — 99153 MOD SED SAME PHYS/QHP EA: CPT

## 2024-04-25 PROCEDURE — 25500020 PHARM REV CODE 255: Performed by: SURGERY

## 2024-04-25 PROCEDURE — 36901 INTRO CATH DIALYSIS CIRCUIT: CPT | Mod: ,,, | Performed by: SURGERY

## 2024-04-25 RX ORDER — FENTANYL CITRATE 50 UG/ML
INJECTION, SOLUTION INTRAMUSCULAR; INTRAVENOUS
Status: COMPLETED | OUTPATIENT
Start: 2024-04-25 | End: 2024-04-25

## 2024-04-25 RX ORDER — MIDAZOLAM HYDROCHLORIDE 2 MG/2ML
INJECTION, SOLUTION INTRAMUSCULAR; INTRAVENOUS
Status: COMPLETED | OUTPATIENT
Start: 2024-04-25 | End: 2024-04-25

## 2024-04-25 RX ORDER — CLINDAMYCIN PHOSPHATE 900 MG/50ML
INJECTION, SOLUTION INTRAVENOUS
Status: COMPLETED | OUTPATIENT
Start: 2024-04-25 | End: 2024-04-25

## 2024-04-25 RX ORDER — LIDOCAINE HYDROCHLORIDE 10 MG/ML
INJECTION INFILTRATION; PERINEURAL
Status: COMPLETED | OUTPATIENT
Start: 2024-04-25 | End: 2024-04-25

## 2024-04-25 RX ADMIN — IOHEXOL 30 ML: 350 INJECTION, SOLUTION INTRAVENOUS at 12:04

## 2024-04-25 RX ADMIN — LIDOCAINE HYDROCHLORIDE 5 ML: 10 INJECTION, SOLUTION INFILTRATION; PERINEURAL at 12:04

## 2024-04-25 RX ADMIN — CLINDAMYCIN PHOSPHATE 900 MG: 18 INJECTION, SOLUTION INTRAVENOUS at 12:04

## 2024-04-25 RX ADMIN — FENTANYL CITRATE 50 MCG: 50 INJECTION, SOLUTION INTRAMUSCULAR; INTRAVENOUS at 12:04

## 2024-04-25 RX ADMIN — MIDAZOLAM HYDROCHLORIDE 1 MG: 1 INJECTION, SOLUTION INTRAMUSCULAR; INTRAVENOUS at 12:04

## 2024-04-25 NOTE — INTERVAL H&P NOTE
The patient has been examined and the H&P has been reviewed:    I concur with the findings and no changes have occurred since H&P was written.    Mallampati Scale Class 2   ASA Classification Class III     Sedation Plan: Moderate sedation     There are no hospital problems to display for this patient.

## 2024-04-25 NOTE — BRIEF OP NOTE
Date:  April 25, 2024     Surgeon(s): John Hudson MD, PhD      Assistant: None    Pre-op Diagnosis:   1. T82.858A: Stenosis of vascular prosthetic devices, implants and grafts, initial encounter  2.   Patient Active Problem List    Diagnosis Date Noted    Other stimulant dependence, uncomplicated 01/23/2024    ESRD (end stage renal disease) 08/08/2023    Lightheadedness 08/06/2023    Current long-term use of anticoagulant medication with history of deep venous thrombosis (DVT) 08/06/2023    Secondary hyperparathyroidism of renal origin 02/03/2023    Acute deep vein thrombosis (DVT) of non-extremity vein - subclavian 01/19/2023    Dizzy spells 12/07/2022    Anemia due to chronic kidney disease 12/01/2022    Immunosuppression due to drug therapy 11/10/2022    Gastric reflux 11/10/2022    Hematuria syndrome 11/10/2022    Dependence on hemodialysis 11/10/2022    Dysphagia 11/02/2022    Moderate malnutrition 10/27/2022    Antineutrophilic cytoplasmic antibody (ANCA) vasculitis 10/26/2022    Other specified anemias 10/19/2022    Alteration in instrumental activities of daily living (IADL) 09/12/2022    Other nonthrombocytopenic purpura 05/30/2022    Atherosclerosis of renal artery 05/30/2022    S/P meniscectomy 12/23/2021    Impaired mobility 10/06/2021    Acute medial meniscal tear, right, initial encounter 10/04/2021    Sleep arousal disorder     DONAVAN (obstructive sleep apnea) 11/09/2020    CAREY (dyspnea on exertion) 08/31/2020    Chronic diastolic heart failure 08/31/2020    Syncope 08/13/2019    Chest pain 04/27/2019    Mitral valve regurgitation 04/11/2019    Hypothyroid     Primary hypertension           Post-op Diagnosis: Same     Procedure(s):   1. US guided access to left arm brachial axillary AV graft  2. Left arm fistulogram  3. Central venogram  4. Moderate sedation  5.  Catheterization of central veins for central venogram, unable to traverse chronic total occlusion of left innominate  vein    Anesthesia: Local MAC     Findings/Key Components:   Left arm AV graft and axillary vein outflow widely patent.  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  Attempted to cross this chronic total occlusion, however unable.  Additional central venograms did not show reconstitution of left innominate vein or SVC    EBL: Minimal    MODERATE SEDATION   I was present for and monitored the patients cardio respiratory functions during the moderate sedation. See nurses notes for Intra-service start and end times, the medications their doseage and route.    Time of sedation:  15 mins.     [facility]  Discharge Note  Short Stay    Mammogram Screening      OUTCOME: Patient tolerated treatment/procedure well without complication and is now ready for discharge.    DISPOSITION: Home or Self Care    FINAL DIAGNOSIS:  <principal problem not specified>central venous stenosis  FOLLOWUP: In clinic    DISCHARGE INSTRUCTIONS:    Discharge Procedure Orders   Remove dressing in 48 hours     CV US Hemodialysis Access   Standing Status: Future Standing Exp. Date: 04/25/25     Order Specific Question Answer Comments   Release to patient Immediate      Type & Screen   Standing Status: Future Standing Exp. Date: 07/24/25     Activity as tolerated         Clinical Reference Documents Added to Patient Instructions         Document    FISTULOGRAM (ENGLISH)            TIME SPENT ON DISCHARGE: 35 minutes

## 2024-04-25 NOTE — PLAN OF CARE
Procedure completed, pt tolerated well. No apparent distress noted. Dressing applied CDI. Patient to be transferred to [facility] for recovery. Report called to CRISTINE Motta.

## 2024-04-25 NOTE — Clinical Note
Left: Arm.   Scrubbed with Chlorhexidine/Alcohol.    Hair: N/A.  Skin prep dry before draping.  Prepped by: Fredrick Mann RVT 4/25/2024 11:59 AM.

## 2024-04-25 NOTE — PLAN OF CARE
Care complete. Pt verbalized understanding of discharge instructions and readiness to go home. Now waiting for transport home.

## 2024-04-25 NOTE — ED NOTES
I-STAT Chem-8+ Results:   Value Reference Range   Sodium 137 136-145 mmol/L   Potassium  3.4 3.5-5.1 mmol/L   Chloride 101  mmol/L   Ionized Calcium 1.04 1.06-1.42 mmol/L   CO2 (measured) 26 23-29 mmol/L   Glucose 137  mg/dL   BUN 29 6-30 mg/dL   Creatinine 4.6 0.5-1.4 mg/dL   Hematocrit 24 36-54%       
n/a

## 2024-04-30 NOTE — OP NOTE
Date:  April 25, 2024      Surgeon(s): John Hudson MD, PhD        Assistant: None     Pre-op Diagnosis:   1. T82.858A: Stenosis of vascular prosthetic devices, implants and grafts, initial encounter  2.        Patient Active Problem List     Diagnosis Date Noted    Other stimulant dependence, uncomplicated 01/23/2024    ESRD (end stage renal disease) 08/08/2023    Lightheadedness 08/06/2023    Current long-term use of anticoagulant medication with history of deep venous thrombosis (DVT) 08/06/2023    Secondary hyperparathyroidism of renal origin 02/03/2023    Acute deep vein thrombosis (DVT) of non-extremity vein - subclavian 01/19/2023    Dizzy spells 12/07/2022    Anemia due to chronic kidney disease 12/01/2022    Immunosuppression due to drug therapy 11/10/2022    Gastric reflux 11/10/2022    Hematuria syndrome 11/10/2022    Dependence on hemodialysis 11/10/2022    Dysphagia 11/02/2022    Moderate malnutrition 10/27/2022    Antineutrophilic cytoplasmic antibody (ANCA) vasculitis 10/26/2022    Other specified anemias 10/19/2022    Alteration in instrumental activities of daily living (IADL) 09/12/2022    Other nonthrombocytopenic purpura 05/30/2022    Atherosclerosis of renal artery 05/30/2022    S/P meniscectomy 12/23/2021    Impaired mobility 10/06/2021    Acute medial meniscal tear, right, initial encounter 10/04/2021    Sleep arousal disorder      DONAVAN (obstructive sleep apnea) 11/09/2020    CAREY (dyspnea on exertion) 08/31/2020    Chronic diastolic heart failure 08/31/2020    Syncope 08/13/2019    Chest pain 04/27/2019    Mitral valve regurgitation 04/11/2019    Hypothyroid      Primary hypertension              Post-op Diagnosis: Same     Procedure(s):   1. US guided access to left arm brachial axillary AV graft  2. Left arm fistulogram  3. Central venogram  4. Moderate sedation  5.  Catheterization of central veins for central venogram, unable to traverse chronic total occlusion of left  innominate vein     Anesthesia: Local MAC     Findings/Key Components:   Left arm AV graft and axillary vein outflow widely patent.  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  Attempted to cross this chronic total occlusion, however unable.  Additional central venograms did not show reconstitution of left innominate vein or SVC       EBL: Minimal    PROCEDURE IN DETAIL:After an informed consent was obtained the patient was taken to the room and placed in the supine position.  Arm was prepped and draped in the standard surgical fashion. Under ultrasound guidance, the left arm fistula was accessed with a micropuncture needle; ultrasound confirmed vessel patency, followed by placement of 4/3-Uzbek micropuncture dilator. Through this, an 0.035-inch wire was placed in the short 5-Uzbek sheath.   A fistulogram and central venogram was performed.  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  I then attempted to cross this lesion with  a hyrophilic 0.035-in glidewire was unsuccessful.  In effort to determine if there was reconstitution of the SVC and innominate vein I performed central venography through an 035 glide cath which I advanced into the left subclavian vein.  However I only saw dense collaterals without seeing reconstitution of a definitive left innominate vein or SVC.  Therefore this concluded my case.  The sheath was removed, a 4-0 Monocryl was placed with good hemostasis.    MODERATE SEDATION   I was present for and monitored the patients cardio respiratory functions during the moderate sedation. See nurses notes for Intra-service start and end times, the medications their doseage and route.    Time of sedation:  15 mins.

## 2024-05-03 ENCOUNTER — OFFICE VISIT (OUTPATIENT)
Dept: RHEUMATOLOGY | Facility: CLINIC | Age: 79
End: 2024-05-03
Payer: MEDICARE

## 2024-05-03 VITALS
SYSTOLIC BLOOD PRESSURE: 162 MMHG | BODY MASS INDEX: 26.66 KG/M2 | DIASTOLIC BLOOD PRESSURE: 89 MMHG | HEART RATE: 80 BPM | WEIGHT: 141.13 LBS

## 2024-05-03 DIAGNOSIS — I77.82 ANTINEUTROPHILIC CYTOPLASMIC ANTIBODY (ANCA) VASCULITIS: ICD-10-CM

## 2024-05-03 DIAGNOSIS — Z79.899 IMMUNOSUPPRESSION DUE TO DRUG THERAPY: Primary | ICD-10-CM

## 2024-05-03 DIAGNOSIS — D84.821 IMMUNOSUPPRESSION DUE TO DRUG THERAPY: Primary | ICD-10-CM

## 2024-05-03 PROCEDURE — 3077F SYST BP >= 140 MM HG: CPT | Mod: CPTII,S$GLB,, | Performed by: INTERNAL MEDICINE

## 2024-05-03 PROCEDURE — 99999 PR PBB SHADOW E&M-EST. PATIENT-LVL III: CPT | Mod: PBBFAC,,, | Performed by: INTERNAL MEDICINE

## 2024-05-03 PROCEDURE — 1159F MED LIST DOCD IN RCRD: CPT | Mod: CPTII,S$GLB,, | Performed by: INTERNAL MEDICINE

## 2024-05-03 PROCEDURE — 3079F DIAST BP 80-89 MM HG: CPT | Mod: CPTII,S$GLB,, | Performed by: INTERNAL MEDICINE

## 2024-05-03 PROCEDURE — 1160F RVW MEDS BY RX/DR IN RCRD: CPT | Mod: CPTII,S$GLB,, | Performed by: INTERNAL MEDICINE

## 2024-05-03 PROCEDURE — 1125F AMNT PAIN NOTED PAIN PRSNT: CPT | Mod: CPTII,S$GLB,, | Performed by: INTERNAL MEDICINE

## 2024-05-03 PROCEDURE — 99214 OFFICE O/P EST MOD 30 MIN: CPT | Mod: S$GLB,,, | Performed by: INTERNAL MEDICINE

## 2024-05-03 RX ORDER — PREDNISONE 2.5 MG/1
5 TABLET ORAL DAILY
Qty: 60 TABLET | Refills: 3 | Status: SHIPPED | OUTPATIENT
Start: 2024-05-03

## 2024-05-03 ASSESSMENT — ROUTINE ASSESSMENT OF PATIENT INDEX DATA (RAPID3)
MDHAQ FUNCTION SCORE: 0.6
PAIN SCORE: 7.5
AM STIFFNESS SCORE: 0, NO
TOTAL RAPID3 SCORE: 3.83
PATIENT GLOBAL ASSESSMENT SCORE: 2
FATIGUE SCORE: 5
PSYCHOLOGICAL DISTRESS SCORE: 2.2

## 2024-05-03 NOTE — PROGRESS NOTES
Subjective:       Patient ID: Kristin Goodman is a 79 y.o. female.    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   3)  CHANGES SUGGESTIVE OF FOCAL ACUTE PYELONEPHRITIS.   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   HENT:  Negative for mouth sores and trouble swallowing.    Eyes:  Negative for redness.   Respiratory:  Negative for cough and shortness of breath.    Cardiovascular:  Negative for chest pain.   Gastrointestinal:  Positive for diarrhea. Negative for constipation.   Genitourinary:  Negative for dysuria and genital sores.   Skin:  Negative for rash.   Neurological:  Negative for headaches.   Hematological:  Bruises/bleeds easily.           5/2/2024     6:04 PM   Rapid3 Question Responses and Scores   MDHAQ Score 0.6    Psychologic Score 2.2   Pain Score 7.5   When you awakened in the morning OVER THE LAST WEEK, did you feel stiff? No   Fatigue Score 5   Global Health Score 2   RAPID3 Score 3.83      Objective:   BP (!) 162/89   Pulse 80   Wt 64 kg (141 lb 1.5 oz)   BMI 26.66 kg/m²      Physical Exam   Eyes: Conjunctivae are normal.   Cardiovascular: Normal rate and regular rhythm.   Pulmonary/Chest: She has no wheezes. She has no rales.   Musculoskeletal:         General: No swelling or tenderness.   Lymphadenopathy:     She has no cervical adenopathy.   Skin: No rash noted.   HD access LUE with L hand more blue and cool than R; not tender; full Range of Motion and strength         Assessment:       1. Immunosuppression due to drug therapy    2. Antineutrophilic cytoplasmic antibody (ANCA) vasculitis            Plan:       Problem List Items Addressed This Visit          Active Problems    Antineutrophilic cytoplasmic antibody (ANCA) vasculitis     Has done well with twice weekly hemodialysis, last dose of rituximab was Nov 2023, no recurrent symptoms or labs to suggest active vasculitis or lupus; appears to be in remission with chronic, stable CKD    Will continue rituximab and reduce dose to 500 mg this month  Return To Clinic 4 mo with labs ; add CD20 next lab  Will likely continue q6m rituximab with infusion Nov         Relevant Medications    predniSONE (DELTASONE) 2.5 MG tablet    Immunosuppression due to drug therapy - Primary     No interval infections; up to date on immunizations (except maybe COVID)

## 2024-05-03 NOTE — ASSESSMENT & PLAN NOTE
Has done well with twice weekly hemodialysis, last dose of rituximab was Nov 2023, no recurrent symptoms or labs to suggest active vasculitis or lupus; appears to be in remission with chronic, stable CKD    Will continue rituximab and reduce dose to 500 mg this month  Return To Clinic 4 mo with labs ; add CD20 next lab  Will likely continue q6m rituximab with infusion Nov

## 2024-05-03 NOTE — PROGRESS NOTES
5/2/2024     6:04 PM   Rapid3 Question Responses and Scores   MDHAQ Score 0.6   Psychologic Score 2.2   Pain Score 7.5   When you awakened in the morning OVER THE LAST WEEK, did you feel stiff? No   Fatigue Score 5   Global Health Score 2   RAPID3 Score 3.83         Answers submitted by the patient for this visit:  Rheumatology Questionnaire (Submitted on 5/2/2024)  fever: No  eye redness: No  mouth sores: No  headaches: No  shortness of breath: No  chest pain: No  trouble swallowing: No  diarrhea: Yes  constipation: No  unexpected weight change: No  genital sore: No  dysuria: No  During the last 3 days, have you had a skin rash?: No  Bruises or bleeds easily: Yes  cough: No

## 2024-05-07 DIAGNOSIS — M79.10 MYALGIA: ICD-10-CM

## 2024-05-08 RX ORDER — CYCLOBENZAPRINE HCL 5 MG
TABLET ORAL
Qty: 20 TABLET | Refills: 2 | Status: SHIPPED | OUTPATIENT
Start: 2024-05-08 | End: 2024-06-13 | Stop reason: SDUPTHER

## 2024-05-09 ENCOUNTER — PATIENT MESSAGE (OUTPATIENT)
Dept: RHEUMATOLOGY | Facility: CLINIC | Age: 79
End: 2024-05-09
Payer: MEDICARE

## 2024-05-10 DIAGNOSIS — D84.9 IMMUNOSUPPRESSION: ICD-10-CM

## 2024-05-10 RX ORDER — ATOVAQUONE 750 MG/5ML
1500 SUSPENSION ORAL DAILY
Qty: 300 ML | Refills: 3 | Status: SHIPPED | OUTPATIENT
Start: 2024-05-10

## 2024-05-15 ENCOUNTER — HOSPITAL ENCOUNTER (OUTPATIENT)
Dept: RADIOLOGY | Facility: HOSPITAL | Age: 79
Discharge: HOME | End: 2024-05-15
Attending: SURGERY
Payer: MEDICARE

## 2024-05-15 ENCOUNTER — OFFICE VISIT (OUTPATIENT)
Dept: VASCULAR SURGERY | Facility: CLINIC | Age: 79
End: 2024-05-15
Payer: MEDICARE

## 2024-05-15 VITALS
HEART RATE: 80 BPM | SYSTOLIC BLOOD PRESSURE: 128 MMHG | DIASTOLIC BLOOD PRESSURE: 72 MMHG | WEIGHT: 140.88 LBS | HEIGHT: 61 IN | BODY MASS INDEX: 26.6 KG/M2

## 2024-05-15 DIAGNOSIS — S25.302A: ICD-10-CM

## 2024-05-15 DIAGNOSIS — R07.89 OTHER CHEST PAIN: Primary | ICD-10-CM

## 2024-05-15 PROCEDURE — 71260 CT THORAX DX C+: CPT | Mod: 26,,, | Performed by: RADIOLOGY

## 2024-05-15 PROCEDURE — 71260 CT THORAX DX C+: CPT | Mod: TC

## 2024-05-15 PROCEDURE — 25500020 PHARM REV CODE 255: Performed by: SURGERY

## 2024-05-15 PROCEDURE — 99999 PR PBB SHADOW E&M-EST. PATIENT-LVL III: CPT | Mod: PBBFAC,,, | Performed by: SURGERY

## 2024-05-15 PROCEDURE — 1101F PT FALLS ASSESS-DOCD LE1/YR: CPT | Mod: CPTII,S$GLB,, | Performed by: SURGERY

## 2024-05-15 PROCEDURE — 3078F DIAST BP <80 MM HG: CPT | Mod: CPTII,S$GLB,, | Performed by: SURGERY

## 2024-05-15 PROCEDURE — 99215 OFFICE O/P EST HI 40 MIN: CPT | Mod: S$GLB,,, | Performed by: SURGERY

## 2024-05-15 PROCEDURE — 3074F SYST BP LT 130 MM HG: CPT | Mod: CPTII,S$GLB,, | Performed by: SURGERY

## 2024-05-15 PROCEDURE — 3288F FALL RISK ASSESSMENT DOCD: CPT | Mod: CPTII,S$GLB,, | Performed by: SURGERY

## 2024-05-15 PROCEDURE — 1126F AMNT PAIN NOTED NONE PRSNT: CPT | Mod: CPTII,S$GLB,, | Performed by: SURGERY

## 2024-05-15 RX ADMIN — IOHEXOL 75 ML: 350 INJECTION, SOLUTION INTRAVENOUS at 09:05

## 2024-05-17 ENCOUNTER — OFFICE VISIT (OUTPATIENT)
Dept: CARDIOLOGY | Facility: CLINIC | Age: 79
End: 2024-05-17
Payer: MEDICARE

## 2024-05-17 VITALS
WEIGHT: 143.88 LBS | OXYGEN SATURATION: 100 % | DIASTOLIC BLOOD PRESSURE: 70 MMHG | SYSTOLIC BLOOD PRESSURE: 138 MMHG | RESPIRATION RATE: 15 BRPM | BODY MASS INDEX: 27.16 KG/M2 | HEIGHT: 61 IN | HEART RATE: 77 BPM

## 2024-05-17 DIAGNOSIS — N18.6 ESRD (END STAGE RENAL DISEASE): ICD-10-CM

## 2024-05-17 DIAGNOSIS — Z78.9 PATIENT UNABLE TO EXERCISE: ICD-10-CM

## 2024-05-17 DIAGNOSIS — R06.09 DOE (DYSPNEA ON EXERTION): ICD-10-CM

## 2024-05-17 DIAGNOSIS — R07.9 CHEST PAIN, UNSPECIFIED TYPE: ICD-10-CM

## 2024-05-17 DIAGNOSIS — G47.33 OSA (OBSTRUCTIVE SLEEP APNEA): ICD-10-CM

## 2024-05-17 DIAGNOSIS — Z01.810 PREOP CARDIOVASCULAR EXAM: ICD-10-CM

## 2024-05-17 DIAGNOSIS — I70.1 ATHEROSCLEROSIS OF RENAL ARTERY: ICD-10-CM

## 2024-05-17 DIAGNOSIS — I10 PRIMARY HYPERTENSION: Primary | ICD-10-CM

## 2024-05-17 PROCEDURE — 93000 ELECTROCARDIOGRAM COMPLETE: CPT | Mod: S$GLB,,, | Performed by: INTERNAL MEDICINE

## 2024-05-17 PROCEDURE — 1159F MED LIST DOCD IN RCRD: CPT | Mod: CPTII,S$GLB,, | Performed by: INTERNAL MEDICINE

## 2024-05-17 PROCEDURE — 1126F AMNT PAIN NOTED NONE PRSNT: CPT | Mod: CPTII,S$GLB,, | Performed by: INTERNAL MEDICINE

## 2024-05-17 PROCEDURE — 99999 PR PBB SHADOW E&M-EST. PATIENT-LVL IV: CPT | Mod: PBBFAC,,, | Performed by: INTERNAL MEDICINE

## 2024-05-17 PROCEDURE — 3078F DIAST BP <80 MM HG: CPT | Mod: CPTII,S$GLB,, | Performed by: INTERNAL MEDICINE

## 2024-05-17 PROCEDURE — 3288F FALL RISK ASSESSMENT DOCD: CPT | Mod: CPTII,S$GLB,, | Performed by: INTERNAL MEDICINE

## 2024-05-17 PROCEDURE — 1101F PT FALLS ASSESS-DOCD LE1/YR: CPT | Mod: CPTII,S$GLB,, | Performed by: INTERNAL MEDICINE

## 2024-05-17 PROCEDURE — 99214 OFFICE O/P EST MOD 30 MIN: CPT | Mod: S$GLB,,, | Performed by: INTERNAL MEDICINE

## 2024-05-17 PROCEDURE — 3075F SYST BP GE 130 - 139MM HG: CPT | Mod: CPTII,S$GLB,, | Performed by: INTERNAL MEDICINE
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
## 2024-05-18 LAB
OHS QRS DURATION: 76 MS
OHS QTC CALCULATION: 435 MS

## 2024-05-19 NOTE — PROGRESS NOTES
Mrs. Shrestha is status post left arm brachial to axillary AV graft on 9/7/2023         Postoperatively she was able to use her AV graft successfully for dialysis but She was referred for evaluation of her AV graft because of high venous pressures and slower flows recently.  Of note previously patient had tunneled dialysis catheter is in bilateral internal jugular and history of left subclavian vein thrombus.       L arm fistulogram - April 25 showing widely patent  Left arm AV graft and axillary vein outflow widely patent. 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 Attempted to cross this chronic total occlusion, however unable. Additional central venograms did not show reconstitution of left innominate vein or SVC .      CT venogram was obtained which confirms left innominate vein occlusion, but does localize the right innominate vein and SVC are patent.  Therefore new access could be considered in her right arm.  Although I would elect to perform a venogram accessing her right arm to confirm patency prior to placement of new access in her right arm.    Patient now presents to discuss CT results and follow-up after her fistulogram.  She reports that her left arm swelling has markedly improved, and her left arm appears to be about the same size of her right arm.  She also reports that she has had no problems using her access for dialysis and there have been no reported problems by the techs.  Therefore given she is asymptomatic and her access appears to be functioning without problems I do not recommend pursuing new access at this time..  I will see her in 3 months with repeat left arm duplex    Of a side note patient does report that she gets occasional chest pains and left arm pains after dialysis, particularly when food is removed.  Given the patient's risk factors I am concerned that this could be an angina equivalent, and I am referring the patient to see her cardiologist   Lina

## 2024-06-12 ENCOUNTER — PATIENT MESSAGE (OUTPATIENT)
Dept: RHEUMATOLOGY | Facility: CLINIC | Age: 79
End: 2024-06-12
Payer: MEDICARE

## 2024-06-13 ENCOUNTER — TELEPHONE (OUTPATIENT)
Dept: INFUSION THERAPY | Facility: HOSPITAL | Age: 79
End: 2024-06-13
Payer: MEDICARE

## 2024-06-13 DIAGNOSIS — M79.10 MYALGIA: ICD-10-CM

## 2024-06-13 DIAGNOSIS — I10 PRIMARY HYPERTENSION: ICD-10-CM

## 2024-06-13 RX ORDER — CYCLOBENZAPRINE HCL 5 MG
5 TABLET ORAL NIGHTLY PRN
Qty: 20 TABLET | Refills: 2 | Status: SHIPPED | OUTPATIENT
Start: 2024-06-13

## 2024-06-13 RX ORDER — AMLODIPINE BESYLATE 10 MG/1
10 TABLET ORAL DAILY
Qty: 90 TABLET | Refills: 3 | Status: SHIPPED | OUTPATIENT
Start: 2024-06-13

## 2024-06-20 NOTE — PLAN OF CARE
[facility] - Intensive Care ([facility])  Discharge Final Note    Primary Care Provider: Lori Hernandez MD    Expected Discharge Date: 12/13/2022    [facility]  Pt's daughter, Roro, notified of discharge.      Final Discharge Note (most recent)       Final Note - 12/14/22 0714          Final Note    Assessment Type Final Discharge Note     Anticipated Discharge Disposition Long Term Acute Care        Post-Acute Status    Post-Acute Authorization Placement     Post-Acute Placement Status Set-up Complete/Auth obtained     Diaylsis Status Referrals Sent     Discharge Delays None known at this time                     Important Message from Medicare  Important Message from Medicare regarding Discharge Appeal Rights: Given to patient/caregiver, Explained to patient/caregiver, Signed/date by patient/caregiver     Date IMM was signed: 11/18/22  Time IMM was signed: 3336    Indigo Brown LMSW  PRN-  [facility]  Ext. [phone]       Quality 226: Preventive Care And Screening: Tobacco Use: Screening And Cessation Intervention: Patient screened for tobacco use and is an ex/non-smoker Quality 130: Documentation Of Current Medications In The Medical Record: Current Medications Documented Quality 47: Advance Care Plan: Advance Care Planning discussed and documented; advance care plan or surrogate decision maker documented in the medical record. Quality 431: Preventive Care And Screening: Unhealthy Alcohol Use - Screening: Patient not identified as an unhealthy alcohol user when screened for unhealthy alcohol use using a systematic screening method Quality 485: Psoriasis - Improvement In Patient-Reported Itch Severity: Itch severity assessment score is reduced by 3 or more points from the initial (index) assessment score to the follow-up visit score Detail Level: Zone

## 2024-06-21 ENCOUNTER — HOSPITAL ENCOUNTER (OUTPATIENT)
Dept: CARDIOLOGY | Facility: HOSPITAL | Age: 79
Discharge: HOME | End: 2024-06-21
Attending: INTERNAL MEDICINE
Payer: MEDICARE

## 2024-06-21 ENCOUNTER — HOSPITAL ENCOUNTER (OUTPATIENT)
Dept: RADIOLOGY | Facility: HOSPITAL | Age: 79
Discharge: HOME | End: 2024-06-21
Attending: INTERNAL MEDICINE
Payer: MEDICARE

## 2024-06-21 DIAGNOSIS — R07.9 CHEST PAIN, UNSPECIFIED TYPE: ICD-10-CM

## 2024-06-21 LAB
CV STRESS BASE HR: 63 BPM
DIASTOLIC BLOOD PRESSURE: 79 MMHG
NUC REST EJECTION FRACTION: 80
OHS CV CPX 85 PERCENT MAX PREDICTED HEART RATE MALE: 122
OHS CV CPX MAX PREDICTED HEART RATE: 143
OHS CV CPX PATIENT IS FEMALE: 1
OHS CV CPX PATIENT IS MALE: 0
OHS CV CPX PEAK DIASTOLIC BLOOD PRESSURE: 77 MMHG
OHS CV CPX PEAK HEAR RATE: 109 BPM
OHS CV CPX PEAK RATE PRESSURE PRODUCT: NORMAL
OHS CV CPX PEAK SYSTOLIC BLOOD PRESSURE: 144 MMHG
OHS CV CPX PERCENT MAX PREDICTED HEART RATE ACHIEVED: 79
OHS CV CPX RATE PRESSURE PRODUCT PRESENTING: NORMAL
SYSTOLIC BLOOD PRESSURE: 163 MMHG

## 2024-06-21 PROCEDURE — 93018 CV STRESS TEST I&R ONLY: CPT | Mod: ,,, | Performed by: INTERNAL MEDICINE

## 2024-06-21 PROCEDURE — A9502 TC99M TETROFOSMIN: HCPCS | Performed by: INTERNAL MEDICINE

## 2024-06-21 PROCEDURE — 93017 CV STRESS TEST TRACING ONLY: CPT

## 2024-06-21 PROCEDURE — 93016 CV STRESS TEST SUPVJ ONLY: CPT | Mod: ,,, | Performed by: INTERNAL MEDICINE

## 2024-06-21 PROCEDURE — 78452 HT MUSCLE IMAGE SPECT MULT: CPT | Mod: 26,,, | Performed by: INTERNAL MEDICINE

## 2024-06-21 PROCEDURE — 78452 HT MUSCLE IMAGE SPECT MULT: CPT

## 2024-06-21 PROCEDURE — 63600175 PHARM REV CODE 636 W HCPCS: Performed by: INTERNAL MEDICINE

## 2024-06-21 RX ORDER — REGADENOSON 0.08 MG/ML
0.4 INJECTION, SOLUTION INTRAVENOUS ONCE
Status: COMPLETED | OUTPATIENT
Start: 2024-06-21 | End: 2024-06-21

## 2024-06-21 RX ADMIN — TETROFOSMIN 10.7 MILLICURIE: 1.38 INJECTION, POWDER, LYOPHILIZED, FOR SOLUTION INTRAVENOUS at 07:06

## 2024-06-21 RX ADMIN — REGADENOSON 0.4 MG: 0.08 INJECTION, SOLUTION INTRAVENOUS at 08:06

## 2024-06-21 RX ADMIN — TETROFOSMIN 30.6 MILLICURIE: 1.38 INJECTION, POWDER, LYOPHILIZED, FOR SOLUTION INTRAVENOUS at 08:06

## 2024-06-28 ENCOUNTER — INFUSION (OUTPATIENT)
Dept: INFUSION THERAPY | Facility: HOSPITAL | Age: 79
End: 2024-06-28
Payer: MEDICARE

## 2024-06-28 VITALS
BODY MASS INDEX: 26.39 KG/M2 | HEART RATE: 79 BPM | TEMPERATURE: 98 F | DIASTOLIC BLOOD PRESSURE: 64 MMHG | SYSTOLIC BLOOD PRESSURE: 129 MMHG | RESPIRATION RATE: 18 BRPM | WEIGHT: 139.75 LBS | HEIGHT: 61 IN

## 2024-06-28 DIAGNOSIS — I77.82 ANTINEUTROPHILIC CYTOPLASMIC ANTIBODY (ANCA) VASCULITIS: Primary | ICD-10-CM

## 2024-06-28 PROCEDURE — 63600175 PHARM REV CODE 636 W HCPCS: Performed by: INTERNAL MEDICINE

## 2024-06-28 PROCEDURE — 96367 TX/PROPH/DG ADDL SEQ IV INF: CPT

## 2024-06-28 PROCEDURE — 96415 CHEMO IV INFUSION ADDL HR: CPT

## 2024-06-28 PROCEDURE — 25000003 PHARM REV CODE 250: Performed by: INTERNAL MEDICINE

## 2024-06-28 RX ORDER — DIPHENHYDRAMINE HYDROCHLORIDE 50 MG/ML
50 INJECTION INTRAMUSCULAR; INTRAVENOUS ONCE AS NEEDED
OUTPATIENT
Start: 2024-11-01

## 2024-06-28 RX ORDER — SODIUM CHLORIDE 0.9 % (FLUSH) 0.9 %
10 SYRINGE (ML) INJECTION
Status: DISCONTINUED | OUTPATIENT
Start: 2024-06-28 | End: 2024-06-28 | Stop reason: HOSPADM

## 2024-06-28 RX ORDER — DIPHENHYDRAMINE HYDROCHLORIDE 50 MG/ML
50 INJECTION INTRAMUSCULAR; INTRAVENOUS ONCE AS NEEDED
Status: DISCONTINUED | OUTPATIENT
Start: 2024-06-28 | End: 2024-06-28 | Stop reason: HOSPADM

## 2024-06-28 RX ORDER — MEPERIDINE HYDROCHLORIDE 50 MG/ML
25 INJECTION INTRAMUSCULAR; INTRAVENOUS; SUBCUTANEOUS
Status: DISCONTINUED | OUTPATIENT
Start: 2024-06-28 | End: 2024-06-28 | Stop reason: HOSPADM

## 2024-06-28 RX ORDER — FAMOTIDINE 10 MG/ML
20 INJECTION INTRAVENOUS
OUTPATIENT
Start: 2024-11-01

## 2024-06-28 RX ORDER — ACETAMINOPHEN 325 MG/1
650 TABLET ORAL
OUTPATIENT
Start: 2024-11-01

## 2024-06-28 RX ORDER — HEPARIN 100 UNIT/ML
500 SYRINGE INTRAVENOUS
Status: DISCONTINUED | OUTPATIENT
Start: 2024-06-28 | End: 2024-06-28 | Stop reason: HOSPADM

## 2024-06-28 RX ORDER — EPINEPHRINE 0.3 MG/.3ML
0.3 INJECTION SUBCUTANEOUS ONCE AS NEEDED
OUTPATIENT
Start: 2024-11-01

## 2024-06-28 RX ORDER — ACETAMINOPHEN 325 MG/1
650 TABLET ORAL
Status: COMPLETED | OUTPATIENT
Start: 2024-06-28 | End: 2024-06-28

## 2024-06-28 RX ORDER — SODIUM CHLORIDE 0.9 % (FLUSH) 0.9 %
10 SYRINGE (ML) INJECTION
OUTPATIENT
Start: 2024-11-01

## 2024-06-28 RX ORDER — EPINEPHRINE 0.3 MG/.3ML
0.3 INJECTION SUBCUTANEOUS ONCE AS NEEDED
Status: DISCONTINUED | OUTPATIENT
Start: 2024-06-28 | End: 2024-06-28 | Stop reason: HOSPADM

## 2024-06-28 RX ORDER — HEPARIN 100 UNIT/ML
500 SYRINGE INTRAVENOUS
OUTPATIENT
Start: 2024-11-01

## 2024-06-28 RX ORDER — MEPERIDINE HYDROCHLORIDE 50 MG/ML
25 INJECTION INTRAMUSCULAR; INTRAVENOUS; SUBCUTANEOUS
OUTPATIENT
Start: 2024-11-01 | End: 2024-11-02

## 2024-06-28 RX ORDER — FAMOTIDINE 10 MG/ML
20 INJECTION INTRAVENOUS
Status: COMPLETED | OUTPATIENT
Start: 2024-06-28 | End: 2024-06-28

## 2024-06-28 RX ADMIN — DIPHENHYDRAMINE HYDROCHLORIDE 50 MG: 50 INJECTION, SOLUTION INTRAMUSCULAR; INTRAVENOUS at 08:06

## 2024-06-28 RX ADMIN — FAMOTIDINE 20 MG: 10 INJECTION INTRAVENOUS at 08:06

## 2024-06-28 RX ADMIN — ACETAMINOPHEN 650 MG: 325 TABLET ORAL at 08:06

## 2024-06-28 RX ADMIN — SODIUM CHLORIDE 500 MG: 9 INJECTION, SOLUTION INTRAVENOUS at 09:06

## 2024-06-28 NOTE — PLAN OF CARE
Her veins on rt side are small and blow .got good vein in thumb.Tolerated rituxan well.will return in 6 months.

## 2024-07-09 NOTE — PROGRESS NOTES
[facility] - [facility] ([facility]  Adult Nutrition  Consult Note    SUMMARY     Recommendations    Continue Renal diet; encourage PO intake as tolerated.   Add Novasource ONS to aid in caloric intake.  RD to monitor & follow-up.    Goals: Meet % EEN, EPN by RD f/u date  Nutrition Goal Status: progressing towards goal  Communication of RD Recs: reviewed with RN    Assessment and Plan    Moderate malnutrition    Nutrition Problem:  Moderate Protein-Calorie Malnutrition  Malnutrition in the context of Acute Illness/Injury    Related to (etiology):  Inability to consume sufficient energy    Signs and Symptoms (as evidenced by):  Body Fat Depletion: moderate depletion of orbitals and triceps   Muscle Mass Depletion: moderate depletion of temples and clavicle region   Weight Loss: 7.5% x 3 months  Fluid Accumulation: mild    Interventions(treatment strategy):  Collaboration of nutrition care w/ other providers  EN    Nutrition Diagnosis Status:  Continues     Malnutrition Assessment    Weight Loss (Malnutrition): 7.5% in 3 months   Orbital Region (Subcutaneous Fat Loss): moderate depletion  Upper Arm Region (Subcutaneous Fat Loss): moderate depletion   [place] Region (Muscle Loss): moderate depletion  Clavicle Bone Region (Muscle Loss): moderate depletion   Edema (Fluid Accumulation): 2-->mild     Reason for Assessment    Reason For Assessment: RD follow-up  Diagnosis: other (see comments) (Polyangiittis)  Relevant Medical History: HTN, HF  Interdisciplinary Rounds: did not attend    General Information Comments: Pt tolerating diet, however +poor PO intake. Pt receiving HD, planning for cath placement today. UBW: 150#, per chart review. NFPE complete 10/27 (updated today) - moderate fat & muscle wasting continue. Pt meets criteria for moderate malnutrition. Please see PES statement for details.  Nutrition Discharge Planning: Pending clinical course    Nutrition/Diet History    Spiritual, Cultural Beliefs,  "Religion Practices, Values that Affect Care: no  Food Allergies: other (see comments) (Peaches)  Factors Affecting Nutritional Intake: decreased appetite    Anthropometrics    Temp: 98.3 °F (36.8 °C)  Height Method: Stated  Height: 5' 1" (154.9 cm)  Height (inches): 61 in  Weight Method: Bed Scale  Weight: 66.3 kg (146 lb 2.6 oz)  Weight (lb): 146.17 lb  Ideal Body Weight (IBW), Female: 105 lb  % Ideal Body Weight, Female (lb): 139.21 %  BMI (Calculated): 27.6  BMI Grade: 25 - 29.9 - overweight  Usual Body Weight (UBW), k.7 kg  % Usual Body Weight: 92.62  % Weight Change From Usual Weight: -7.57 %    Lab/Procedures/Meds    Pertinent Labs Reviewed: reviewed  Pertinent Labs Comments: BUN 61, Creat 8.4, GFR 4.6, P 4.9  Pertinent Medications Reviewed: reviewed  Pertinent Medications Comments: -    Estimated/Assessed Needs    Weight Used For Calorie Calculations: 66.3 kg (146 lb 2.6 oz)    Energy Calorie Requirements (kcal): 7995-9229 kcal/d  Energy Need Method: Kcal/kg (25-30 kcal/kg)    Protein Requirements: 86 g/d (1.3 g/kg)  Weight Used For Protein Calculations: 66.3 kg (146 lb 2.6 oz)    Estimated Fluid Requirement Method: other (see comments) (Per MD or 1 mL/kcal)  RDA Method (mL): 1658    Nutrition Prescription Ordered    Current Diet Order: Renal, Lactose restricted     Evaluation of Received Nutrient/Fluid Intake    I/O: -2.1L since     Comments: LBM:     Tolerance: tolerating    Nutrition Risk    Level of Risk/Frequency of Follow-up:  (1x/week)     Monitor and Evaluation    Food and Nutrient Intake: energy intake, food and beverage intake, enteral nutrition intake  Food and Nutrient Adminstration: diet order, enteral and parenteral nutrition administration  Physical Activity and Function: nutrition-related ADLs and IADLs  Anthropometric Measurements: weight, weight change  Biochemical Data, Medical Tests and Procedures: inflammatory profile, lipid profile, glucose/endocrine profile, " Detail Level: Detailed gastrointestinal profile, electrolyte and renal panel  Nutrition-Focused Physical Findings: overall appearance     Nutrition Follow-Up    RD Follow-up?: Yes     Size Of Lesion: 0.6 Name Of The Referring Provider For Procedure: Neli Love MD Incorporate Mauc In Note: Yes Location Indication Override (Is Already Calculated Based On Selected Body Location): Area H

## 2024-07-11 ENCOUNTER — OFFICE VISIT (OUTPATIENT)
Dept: CARDIOLOGY | Facility: CLINIC | Age: 79
End: 2024-07-11
Payer: MEDICARE

## 2024-07-11 VITALS
HEART RATE: 72 BPM | BODY MASS INDEX: 26.47 KG/M2 | RESPIRATION RATE: 15 BRPM | SYSTOLIC BLOOD PRESSURE: 148 MMHG | HEIGHT: 61 IN | DIASTOLIC BLOOD PRESSURE: 71 MMHG | OXYGEN SATURATION: 99 % | WEIGHT: 140.19 LBS

## 2024-07-11 DIAGNOSIS — R13.10 DYSPHAGIA, UNSPECIFIED TYPE: ICD-10-CM

## 2024-07-11 DIAGNOSIS — N25.81 SECONDARY HYPERPARATHYROIDISM OF RENAL ORIGIN: ICD-10-CM

## 2024-07-11 DIAGNOSIS — Z01.810 PREOP CARDIOVASCULAR EXAM: ICD-10-CM

## 2024-07-11 DIAGNOSIS — R06.09 DOE (DYSPNEA ON EXERTION): ICD-10-CM

## 2024-07-11 DIAGNOSIS — N18.6 ESRD (END STAGE RENAL DISEASE): ICD-10-CM

## 2024-07-11 DIAGNOSIS — R07.9 CHEST PAIN, UNSPECIFIED TYPE: ICD-10-CM

## 2024-07-11 DIAGNOSIS — G47.33 OSA (OBSTRUCTIVE SLEEP APNEA): ICD-10-CM

## 2024-07-11 DIAGNOSIS — I10 PRIMARY HYPERTENSION: Primary | ICD-10-CM

## 2024-07-11 DIAGNOSIS — I70.1 ATHEROSCLEROSIS OF RENAL ARTERY: ICD-10-CM

## 2024-07-11 PROCEDURE — 99999 PR PBB SHADOW E&M-EST. PATIENT-LVL IV: CPT | Mod: PBBFAC,,, | Performed by: INTERNAL MEDICINE

## 2024-07-11 NOTE — PROGRESS NOTES
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 non-extremity vein - subclavian    Secondary hyperparathyroidism of renal origin    Lightheadedness    Current long-term use of anticoagulant medication with history of deep venous thrombosis (DVT)    ESRD (end stage renal disease)    Other stimulant dependence, uncomplicated       1.  Hypertension:  Well controlled at current therapy.  Continue current therapy.    2.  Mitral valve regurgitation:  Mild-to-moderate on the last echocardiogram.      3.  Dyslipidemia:  Lipitor was stopped because of elevated CPK.   Will continue diet and lifestyle modifications.    4.  Screening ultrasound of the carotids as well as rest and stress ABIs for screening for peripheral artery disease. Normal CONNOR. No significant stenosis on carotid ultrasound      6. Stress test in 2024 did not show any significant ischemia.  Chest pains have resolved.    Follow-up in 6 months      Thank you very much for involving me in the care of your patient.  Please do not hesitate to contact me if there are any questions.      Cesar Mills MD, FACC, [facility]  Interventional Cardiologist, [facility].         This note was dictated with the help of speech recognition software.  There might be un-intended errors and/or  substitutions.

## 2024-08-12 ENCOUNTER — HOSPITAL ENCOUNTER (EMERGENCY)
Facility: HOSPITAL | Age: 79
Discharge: HOME | End: 2024-08-12
Attending: EMERGENCY MEDICINE
Payer: MEDICARE

## 2024-08-12 VITALS
RESPIRATION RATE: 18 BRPM | DIASTOLIC BLOOD PRESSURE: 66 MMHG | SYSTOLIC BLOOD PRESSURE: 156 MMHG | WEIGHT: 142.19 LBS | HEIGHT: 61 IN | BODY MASS INDEX: 26.85 KG/M2 | TEMPERATURE: 98 F | HEART RATE: 70 BPM | OXYGEN SATURATION: 98 %

## 2024-08-12 DIAGNOSIS — N30.90 CYSTITIS: ICD-10-CM

## 2024-08-12 DIAGNOSIS — R30.0 DYSURIA: Primary | ICD-10-CM

## 2024-08-12 LAB
BACTERIA #/AREA URNS HPF: ABNORMAL /HPF
BILIRUB UR QL STRIP: NEGATIVE
CLARITY UR: CLEAR
COLOR UR: YELLOW
GLUCOSE UR QL STRIP: NEGATIVE
HGB UR QL STRIP: ABNORMAL
HYALINE CASTS #/AREA URNS LPF: 4 /LPF
KETONES UR QL STRIP: NEGATIVE
LEUKOCYTE ESTERASE UR QL STRIP: ABNORMAL
MICROSCOPIC COMMENT: ABNORMAL
NITRITE UR QL STRIP: NEGATIVE
PH UR STRIP: 7 [PH] (ref 5–8)
PROT UR QL STRIP: NEGATIVE
RBC #/AREA URNS HPF: 4 /HPF (ref 0–4)
SP GR UR STRIP: 1.01 (ref 1–1.03)
SQUAMOUS #/AREA URNS HPF: 0 /HPF
URN SPEC COLLECT METH UR: ABNORMAL
UROBILINOGEN UR STRIP-ACNC: NEGATIVE EU/DL
WBC #/AREA URNS HPF: >100 /HPF (ref 0–5)
WBC CLUMPS URNS QL MICRO: ABNORMAL

## 2024-08-12 PROCEDURE — 87088 URINE BACTERIA CULTURE: CPT

## 2024-08-12 PROCEDURE — 87086 URINE CULTURE/COLONY COUNT: CPT

## 2024-08-12 PROCEDURE — 99284 EMERGENCY DEPT VISIT MOD MDM: CPT | Mod: 25

## 2024-08-12 PROCEDURE — 87186 SC STD MICRODIL/AGAR DIL: CPT

## 2024-08-12 PROCEDURE — 25000003 PHARM REV CODE 250

## 2024-08-12 PROCEDURE — 81000 URINALYSIS NONAUTO W/SCOPE: CPT

## 2024-08-12 PROCEDURE — 63600175 PHARM REV CODE 636 W HCPCS

## 2024-08-12 PROCEDURE — 96365 THER/PROPH/DIAG IV INF INIT: CPT

## 2024-08-12 RX ORDER — CIPROFLOXACIN 500 MG/1
500 TABLET ORAL DAILY
Qty: 3 TABLET | Refills: 0 | Status: SHIPPED | OUTPATIENT
Start: 2024-08-12 | End: 2024-08-15

## 2024-08-12 RX ORDER — CIPROFLOXACIN 500 MG/1
500 TABLET ORAL DAILY
Qty: 3 TABLET | Refills: 0 | Status: SHIPPED | OUTPATIENT
Start: 2024-08-12 | End: 2024-08-12

## 2024-08-12 RX ADMIN — CEFTRIAXONE 1 G: 1 INJECTION, POWDER, FOR SOLUTION INTRAMUSCULAR; INTRAVENOUS at 02:08
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
## 2024-08-12 NOTE — DISCHARGE INSTRUCTIONS
Please return to the Emergency Department for any new or worsening symptoms including: fever, chest pain, shortness of breath, loss of consciousness, dizziness, weakness, or any other concerns.     Please follow up with your Primary Care Provider within in the week. If you do not have one, you may contact the one listed on your discharge paperwork or you may also call the [facility] Appointment Desk at [phone] to schedule an appointment with one.     Please take all medication as prescribed.

## 2024-08-13 ENCOUNTER — PATIENT OUTREACH (OUTPATIENT)
Dept: EMERGENCY MEDICINE | Facility: HOSPITAL | Age: 79
End: 2024-08-13
Payer: MEDICARE

## 2024-08-13 NOTE — PROGRESS NOTES
Patient was seen in the ED on 8/12/24. Phoned patient to assist with Post ED Discharge Navigation. Spoke with patients daughter/caregiver to assist with Post ED Discharge Navigation. Patient has an existing PCP appointment.   Ha Palacio

## 2024-08-14 ENCOUNTER — PATIENT MESSAGE (OUTPATIENT)
Dept: FAMILY MEDICINE | Facility: CLINIC | Age: 79
End: 2024-08-14
Payer: MEDICARE

## 2024-08-14 DIAGNOSIS — D84.9 IMMUNOSUPPRESSION: ICD-10-CM

## 2024-08-14 DIAGNOSIS — M79.10 MYALGIA: ICD-10-CM

## 2024-08-14 DIAGNOSIS — I10 PRIMARY HYPERTENSION: ICD-10-CM

## 2024-08-14 DIAGNOSIS — I77.82 ANTINEUTROPHILIC CYTOPLASMIC ANTIBODY (ANCA) VASCULITIS: ICD-10-CM

## 2024-08-14 DIAGNOSIS — Z79.01 CURRENT LONG-TERM USE OF ANTICOAGULANT MEDICATION WITH HISTORY OF DEEP VENOUS THROMBOSIS (DVT): Primary | ICD-10-CM

## 2024-08-14 DIAGNOSIS — Z86.718 CURRENT LONG-TERM USE OF ANTICOAGULANT MEDICATION WITH HISTORY OF DEEP VENOUS THROMBOSIS (DVT): Primary | ICD-10-CM

## 2024-08-14 LAB — BACTERIA UR CULT: ABNORMAL

## 2024-08-14 RX ORDER — CYCLOBENZAPRINE HCL 5 MG
5 TABLET ORAL NIGHTLY PRN
Qty: 20 TABLET | Refills: 2 | Status: SHIPPED | OUTPATIENT
Start: 2024-08-14

## 2024-08-14 RX ORDER — PREDNISONE 2.5 MG/1
5 TABLET ORAL DAILY
Qty: 60 TABLET | Refills: 3 | Status: SHIPPED | OUTPATIENT
Start: 2024-08-14

## 2024-08-14 RX ORDER — ATOVAQUONE 750 MG/5ML
1500 SUSPENSION ORAL DAILY
Qty: 300 ML | Refills: 3 | Status: SHIPPED | OUTPATIENT
Start: 2024-08-14

## 2024-08-14 NOTE — TELEPHONE ENCOUNTER
----- Message from Stephanie Garcia sent at 2024 11:06 AM CDT -----  Regarding: Daughter  Type: RX Refill Request     Who Called:Daughter     Have you contacted your pharmacy:yes     Refill or New Rx:refill     RX Name and Strength:apixaban (ELIQUIS) 5 mg Tab     Preferred Pharmacy with phone number:   [facility] [address] [facility] [address]  [address] [place] [address]  Phone: [phone] Fax: [phone]    Local or Mail Order:Local     Would the patient rather a call back or a response via [facility]?-call back     Best Call Back Number: [phone] (daughter)     Additional Information: pt's daughter states this Rx is also about to  and is running out of refills

## 2024-08-15 NOTE — ASSESSMENT & PLAN NOTE
Patient is a 76 yo F with chronic diastolic heart failure, HTN, OA, who is admitted for respiratory failure. Rheumatology is consulted due to concern for vasculitis. Patient presented with cough and hemoptysis since 9/24/22. She was admitted due to dyspnea and hypoxia on 10/17. She has had Hb drop to 6 this admission requiring blood transfusions. Reviewed her chest imaging which shows progressive disease from 10/14 until now which can be concerning for DAH. This might also explain the Hb drop. No bronch planned for now due to her tenuous respiratory status. GI defers invasive workup for now because she is not stable enough. She has had increasing creatinine from baseline of 1 to 3.0 on 10/23/22. Nephrology concerned for ATN nephritic picture in urine sediment. Pending kidney biopsy result.    UA: 1+ blood, 88 RBCs, 18 WBCs  UPCR 1.54  RF and CCP negative  MITUL+ 1:2560 homogenous, +dsDNA, complements normal  PR3 slightly elevated at 1.2 (reference positive is 1.0)  MPO elevated at 132  C-ANCA+ 1:80  P-ANCA-  GBM antibodies negative  Quantiferon indeterminate  T-Spot Negative    Pending: cryoglobulins    Treating empirically for ANCA vasculitis while awaiting kidney biopsy results:  - s/p IV Solumedrol 1000 mg x3 doses. Now following PEXIVAS steroid taper. Currently on IV Solumedrol 24 mg daily.  - PLEX 10/26, 10/27, 10/29. Next scheduled for 10/30. Total 7 treatments planned.  - Got SLED 10/27. Bedside HD 10/30  - Rituximab 375 mg/m^2 (600 mg) on 10/27 (every 7 day dose 1 of 4)  - Cyclophosphamide 7.5 mg/kg on 10/27 (every 14 day dose 1 of 2)    Recommendations:  1. Continue steroid taper per PEXIVAS trial as in the chart below. Currently IV Solu-Medrol 24 mg daily (prednisone 30 mg daily equivalent). Started on 10/30  2. Atovaquone 1500 mg and Protonix daily while on high dose steroids.  3. Next Rituximab 275 mg/m^2 due on November 3  4. Next cyclophosphamide 7.5 mg/kg due on November 10  5. Monitor CBC and CMP in  10-14 days after giving chemotherapy  6. Follow up kidney biopsy           [Follow-Up: _____] : a [unfilled] follow-up visit

## 2024-08-16 ENCOUNTER — TELEPHONE (OUTPATIENT)
Dept: VASCULAR SURGERY | Facility: CLINIC | Age: 79
End: 2024-08-16
Payer: MEDICARE

## 2024-08-17 RX ORDER — AMLODIPINE BESYLATE 10 MG/1
10 TABLET ORAL DAILY
Qty: 90 TABLET | Refills: 3 | Status: SHIPPED | OUTPATIENT
Start: 2024-08-17

## 2024-08-19 ENCOUNTER — OFFICE VISIT (OUTPATIENT)
Dept: VASCULAR SURGERY | Facility: CLINIC | Age: 79
End: 2024-08-19
Payer: MEDICARE

## 2024-08-19 ENCOUNTER — HOSPITAL ENCOUNTER (OUTPATIENT)
Dept: CARDIOLOGY | Facility: HOSPITAL | Age: 79
Discharge: HOME | End: 2024-08-19
Attending: SURGERY
Payer: MEDICARE

## 2024-08-19 VITALS
SYSTOLIC BLOOD PRESSURE: 137 MMHG | WEIGHT: 142.63 LBS | BODY MASS INDEX: 26.93 KG/M2 | HEART RATE: 73 BPM | DIASTOLIC BLOOD PRESSURE: 74 MMHG | HEIGHT: 61 IN

## 2024-08-19 DIAGNOSIS — Z99.2 DEPENDENCE ON HEMODIALYSIS: ICD-10-CM

## 2024-08-19 DIAGNOSIS — N18.6 ESRD (END STAGE RENAL DISEASE): Primary | ICD-10-CM

## 2024-08-19 LAB
HD NATIVE ARTERY VESSEL: NORMAL
HD OUTFLOW VEIN VESSEL: NORMAL
LEFT DIST ANA PSV: 260 CM/S
LEFT DIST GRAFT PSV: 167 CM/S
LEFT INFLOW PSV: 283 CM/S
LEFT MID GRAFT PSV: 160 CM/S
LEFT OUTFLOW PSV: 151 CM/S
LEFT PROX ANA PSV: 462 CM/S
LEFT PROX GRAFT PSV: 429 CM/S
LEFT VOLUME FLOW PSV: 1141 ML/MIN

## 2024-08-19 PROCEDURE — 93990 DOPPLER FLOW TESTING: CPT | Mod: TC

## 2024-08-19 PROCEDURE — 99999 PR PBB SHADOW E&M-EST. PATIENT-LVL III: CPT | Mod: PBBFAC,,, | Performed by: SURGERY

## 2024-08-19 PROCEDURE — 1101F PT FALLS ASSESS-DOCD LE1/YR: CPT | Mod: CPTII,S$GLB,, | Performed by: SURGERY

## 2024-08-19 PROCEDURE — 3075F SYST BP GE 130 - 139MM HG: CPT | Mod: CPTII,S$GLB,, | Performed by: SURGERY

## 2024-08-19 PROCEDURE — 99215 OFFICE O/P EST HI 40 MIN: CPT | Mod: S$GLB,,, | Performed by: SURGERY

## 2024-08-19 PROCEDURE — 1159F MED LIST DOCD IN RCRD: CPT | Mod: CPTII,S$GLB,, | Performed by: SURGERY

## 2024-08-19 PROCEDURE — 1126F AMNT PAIN NOTED NONE PRSNT: CPT | Mod: CPTII,S$GLB,, | Performed by: SURGERY

## 2024-08-19 PROCEDURE — 3288F FALL RISK ASSESSMENT DOCD: CPT | Mod: CPTII,S$GLB,, | Performed by: SURGERY

## 2024-08-19 PROCEDURE — 3078F DIAST BP <80 MM HG: CPT | Mod: CPTII,S$GLB,, | Performed by: SURGERY
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
## 2024-08-22 ENCOUNTER — OFFICE VISIT (OUTPATIENT)
Dept: FAMILY MEDICINE | Facility: CLINIC | Age: 79
End: 2024-08-22
Payer: MEDICARE

## 2024-08-22 VITALS
HEART RATE: 78 BPM | DIASTOLIC BLOOD PRESSURE: 72 MMHG | WEIGHT: 140.63 LBS | SYSTOLIC BLOOD PRESSURE: 130 MMHG | OXYGEN SATURATION: 95 % | RESPIRATION RATE: 16 BRPM | BODY MASS INDEX: 26.55 KG/M2 | HEIGHT: 61 IN | TEMPERATURE: 99 F

## 2024-08-22 DIAGNOSIS — E21.3 HYPERPARATHYROIDISM, UNSPECIFIED: ICD-10-CM

## 2024-08-22 DIAGNOSIS — E03.9 ACQUIRED HYPOTHYROIDISM: ICD-10-CM

## 2024-08-22 DIAGNOSIS — I70.1 ATHEROSCLEROSIS OF RENAL ARTERY: ICD-10-CM

## 2024-08-22 DIAGNOSIS — E03.9 HYPOTHYROIDISM, UNSPECIFIED TYPE: ICD-10-CM

## 2024-08-22 DIAGNOSIS — Z92.89 HISTORY OF BONE DENSITY STUDY: ICD-10-CM

## 2024-08-22 DIAGNOSIS — I10 PRIMARY HYPERTENSION: Primary | ICD-10-CM

## 2024-08-22 DIAGNOSIS — J30.9 ALLERGIC RHINITIS, UNSPECIFIED SEASONALITY, UNSPECIFIED TRIGGER: ICD-10-CM

## 2024-08-22 DIAGNOSIS — R73.03 PREDIABETES: ICD-10-CM

## 2024-08-22 PROCEDURE — 99999 PR PBB SHADOW E&M-EST. PATIENT-LVL IV: CPT | Mod: PBBFAC,,,

## 2024-08-22 RX ORDER — PREDNISONE 5 MG/1
1 TABLET ORAL
COMMUNITY
Start: 2024-08-13

## 2024-08-22 RX ORDER — LEVOCETIRIZINE DIHYDROCHLORIDE 5 MG/1
2.5 TABLET, FILM COATED ORAL NIGHTLY
Qty: 15 TABLET | Refills: 11 | Status: SHIPPED | OUTPATIENT
Start: 2024-08-22 | End: 2025-08-22

## 2024-08-22 RX ORDER — LEVOTHYROXINE SODIUM 25 UG/1
25 TABLET ORAL DAILY
Qty: 90 TABLET | Refills: 3 | Status: SHIPPED | OUTPATIENT
Start: 2024-08-22
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
## 2024-08-22 NOTE — PROGRESS NOTES
Health Maintenance Due   Topic     Hemoglobin A1c (Prediabetes)      COVID-19 Vaccine (8 - 2023-24 season) Not given at this facility       DEXA Scan

## 2024-08-26 NOTE — PROGRESS NOTES
Subjective:      Patient ID: Kristin Goodman is a 79 y.o. female.    Chief Complaint: Nail Problem    Kristin Goodman is a 79 y.o. female who presents to the clinic complaining of painful ingrown toenail on both feet. Patient has not attempted self treatment.  This is a  recurrent problem. Seen for similar issue in the past. Patient denies history of trauma. Patient  denies purulent drainage.    Current shoe gear: casual shoes    Patient Active Problem List   Diagnosis    Hypothyroid    Primary hypertension    Mitral valve regurgitation    Chest pain    Syncope    CAREY (dyspnea on exertion)    Chronic diastolic heart failure    DONAVAN (obstructive sleep apnea)    Sleep arousal disorder    Acute medial meniscal tear, right, initial encounter    Impaired mobility    S/P meniscectomy    Other nonthrombocytopenic purpura    Atherosclerosis of renal artery    Alteration in instrumental activities of daily living (IADL)    Other specified anemias    Antineutrophilic cytoplasmic antibody (ANCA) vasculitis    Moderate malnutrition    Dysphagia    Immunosuppression due to drug therapy    Gastric reflux    Hematuria syndrome    Dependence on hemodialysis    Anemia due to chronic kidney disease    Dizzy spells    Acute deep vein thrombosis (DVT) of non-extremity vein - subclavian    Secondary hyperparathyroidism of renal origin    Lightheadedness    Current long-term use of anticoagulant medication with history of deep venous thrombosis (DVT)    ESRD (end stage renal disease)    Other stimulant dependence, uncomplicated    UTI symptoms       Current Outpatient Medications on File Prior to Visit   Medication Sig Dispense Refill    amLODIPine (NORVASC) 10 MG tablet Take 1 tablet (10 mg total) by mouth once daily. 90 tablet 3    apixaban (ELIQUIS) 5 mg Tab Take 1 tablet (5 mg total) by mouth 2 (two) times daily. 60 tablet 1    atovaquone (MEPRON) 750 mg/5 mL Susp oral liquid Take 10 mLs (1,500 mg total) by mouth once daily. 300 mL 3     cyclobenzaprine (FLEXERIL) 5 MG tablet Take 1 tablet (5 mg total) by mouth nightly as needed for Muscle spasms. 20 tablet 2    fluticasone propionate (FLONASE) 50 mcg/actuation nasal spray 1 spray (50 mcg total) by Each Nostril route 2 (two) times daily. 16 g 3    levocetirizine (XYZAL) 5 MG tablet Take 0.5 tablets (2.5 mg total) by mouth every evening. For sinus 15 tablet 11    levothyroxine (EUTHYROX) 25 MCG tablet Take 1 tablet (25 mcg total) by mouth once daily. 90 tablet 3    methoxy peg-epoetin beta (MIRCERA INJ) 50 mcg.      predniSONE (DELTASONE) 2.5 MG tablet Take 2 tablets (5 mg total) by mouth once daily. 60 tablet 3    predniSONE (DELTASONE) 5 MG tablet Take 1 tablet by mouth.      RENVELA 800 mg Tab Take 1 tablet (800 mg total) by mouth 3 (three) times daily. 30 tablet 11    torsemide (DEMADEX) 100 MG Tab Take 100 mg by mouth once daily.      albuterol (VENTOLIN HFA) 90 mcg/actuation inhaler Inhale 2 puffs into the lungs every 6 (six) hours as needed for Shortness of Breath. Rescue 18 g 3    carvediloL (COREG) 12.5 MG tablet Take 1 tablet (12.5 mg total) by mouth 2 (two) times daily. 60 tablet 11    QUEtiapine (SEROQUEL) 25 MG Tab Take 1 tablet (25 mg total) by mouth every evening. 30 tablet 11     Current Facility-Administered Medications on File Prior to Visit   Medication Dose Route Frequency Provider Last Rate Last Admin    vancomycin - pharmacy to dose   Intravenous pharmacy to manage frequency John Hudson MD           Review of patient's allergies indicates:   Allergen Reactions    Ampicillin     Lactose Diarrhea    Peaches [peach (prunus persica)] Other (See Comments)     Pt unable to state type of reaction. Information obtained from daughter who states she was informed of allergy from patient.    Penicillins      Other reaction(s): Hives, anaphylaxis    Sulfa (sulfonamide antibiotics) Rash and Hives       Past Surgical History:   Procedure Laterality Date    ARTHROSCOPIC  CHONDROPLASTY OF KNEE JOINT Right 12/21/2021    Procedure: ARTHROSCOPY, KNEE, WITH CHONDROPLASTY;  Surgeon: Elly Sullivan MD;  Location: [facility] OR;  Service: Orthopedics;  Laterality: Right;    COLONOSCOPY N/A 9/28/2020    Procedure: COLONOSCOPY;  Surgeon: Jaylan Flynn MD;  Location: [facility] ENDO;  Service: Endoscopy;  Laterality: N/A;    ESOPHAGOGASTRODUODENOSCOPY N/A 11/14/2022    Procedure: EGD (ESOPHAGOGASTRODUODENOSCOPY);  Surgeon: Asaf Hahn MD;  [facility]  Service: Endoscopy;  Laterality: N/A;    KNEE ARTHROSCOPY W/ MENISCECTOMY Right 12/21/2021    Procedure: ARTHROSCOPY, KNEE, WITH MENISCECTOMY;  Surgeon: Elly Sullivan MD;  Location: [facility] OR;  Service: Orthopedics;  Laterality: Right;  general, regional w catheter, adductor, josefina 50cc,     OOPHORECTOMY      PLACEMENT OF ARTERIOVENOUS GRAFT Left 9/7/2023    Procedure: INSERTION, GRAFT, ARTERIOVENOUS;  Surgeon: John Hudson MD;  Location: [facility] OR;  Service: Vascular;  Laterality: Left;  RN PREOP 8/31/2023 TYPE&SCREEN---done 9/6/2023 9:00 AM-----HAS CARDS CLEARANCE    SYNOVECTOMY OF KNEE Right 12/21/2021    Procedure: SYNOVECTOMY, KNEE;  Surgeon: Elly Sullivan MD;  Location: [facility] OR;  Service: Orthopedics;  Laterality: Right;    TOTAL ABDOMINAL HYSTERECTOMY      19 yrs ago       Family History   Problem Relation Name Age of Onset    Arthritis Mother      Early death Mother  56    Hypertension Mother      Diabetes Father      Early death Father  62    Hypertension Father      Stroke Father      Arthritis Sister      Cancer Sister          cervical    Early death Sister  63    Heart disease Sister          anyuresem    Hypertension Sister      Hyperlipidemia Sister      Alzheimer's disease Sister      Rheum arthritis Sister      Arthritis Brother      Cancer Brother          lung cancer    Early death Brother  59    Heart disease Brother          heart attack    Hypertension Brother      Vision loss Brother       Prostate cancer Brother      Hypertension Daughter      Breast cancer Other niece        Social History     Socioeconomic History    Marital status:    Tobacco Use    Smoking status: Former     Current packs/day: 0.50     Average packs/day: 0.5 packs/day for 6.0 years (3.0 ttl pk-yrs)     Types: Cigarettes    Smokeless tobacco: Never    Tobacco comments:     Quit ~ 30 years ago   Substance and Sexual Activity    Alcohol use: No    Drug use: No    Sexual activity: Never     Partners: Male     Social Determinants of Health     Financial Resource Strain: Low Risk  (8/28/2024)    Overall Financial Resource Strain (CARDIA)     Difficulty of Paying Living Expenses: Not hard at all   Food Insecurity: No Food Insecurity (8/28/2024)    Hunger Vital Sign     Worried About Running Out of Food in the Last Year: Never true     Ran Out of Food in the Last Year: Never true   Transportation Needs: No Transportation Needs (5/2/2024)    PRAPARE - Transportation     Lack of Transportation (Medical): No     Lack of Transportation (Non-Medical): No   Physical Activity: Insufficiently Active (8/28/2024)    Exercise Vital Sign     Days of Exercise per Week: 1 day     Minutes of Exercise per Session: 10 min   Stress: No Stress Concern Present (8/28/2024)    [employer] - Occupational Stress Questionnaire     Feeling of Stress : Only a little   Housing Stability: Unknown (8/8/2023)    Housing Stability Vital Sign     Unable to Pay for Housing in the Last Year: No     Unstable Housing in the Last Year: No       Review of Systems   Constitutional: Negative for chills and fever.   Cardiovascular:  Negative for claudication and leg swelling.   Respiratory:  Negative for cough and shortness of breath.    Skin:  Positive for dry skin and nail changes. Negative for itching and rash.   Musculoskeletal:  Positive for arthritis, joint pain, muscle weakness, myalgias and stiffness. Negative for falls and joint  "swelling.   Gastrointestinal:  Negative for diarrhea, nausea and vomiting.   Neurological:  Negative for numbness, paresthesias, tremors and weakness.   Psychiatric/Behavioral:  Negative for altered mental status and hallucinations.            Objective:      Vitals:    08/28/24 1439   Weight: 63.8 kg (140 lb 10.5 oz)   Height: 5' 1" (1.549 m)   PainSc: 0-No pain         Physical Exam  Vitals and nursing note reviewed.   Constitutional:       General: She is not in acute distress.     Appearance: She is well-developed. She is not toxic-appearing or diaphoretic.      Comments: alert and oriented x 3.    Cardiovascular:      Pulses:           Dorsalis pedis pulses are 2+ on the right side and 2+ on the left side.        Posterior tibial pulses are 2+ on the right side and 2+ on the left side.      Comments:  Capillary refill time is within normal limits. Digital hair present.   Pulmonary:      Effort: No respiratory distress.   Musculoskeletal:         General: No deformity.      Right ankle: No swelling. No tenderness. No lateral malleolus, medial malleolus, AITF ligament, CF ligament or posterior TF ligament tenderness. Normal range of motion.      Right Achilles Tendon: No defects. Lo's test negative.      Left ankle: No swelling. No tenderness. No lateral malleolus, medial malleolus, AITF ligament, CF ligament or posterior TF ligament tenderness. Normal range of motion.      Left Achilles Tendon: No defects. Lo's test negative.      Right foot: No tenderness or bony tenderness.      Left foot: No tenderness or bony tenderness.      Comments: Fat pad atrophy to heels and met heads bilateral    There is equinus deformity bilateral with decreased dorsiflexion at the ankle joint bilateral.     Decreased first MPJ range of motion both weightbearing and nonweightbearing, + crepitus observed the first MP joint, + dorsal flag sign. Moderate bunion deformity is observed .    Patient has hammertoes of digits 2-5 " bilateral partially reducible    Feet:      Right foot:      Skin integrity: Erythema present.      Toenail Condition: Right toenails are ingrown. Fungal disease present.     Left foot:      Toenail Condition: Left toenails are ingrown. Fungal disease present.  Lymphadenopathy:      Comments: No lymphatic streaking     Skin:     General: Skin is warm and dry.      Coloration: Skin is not pale.      Findings: No bruising, burn, laceration or rash.      Nails: There is no clubbing.      Comments: Tenderness to medial hallux nail margin of each foot with ingrown nail plate and HPK buildup.  Purulent fluid  filled pocket to left hallux. Surrounding erythema and minimal edema is noted there is no granuloma formation noted. no malodor     Toenails 1-5 bilaterally are thickened by 2-3 mm, discolored/yellowed, dystrophic, brittle with subungual debris. Tender to distal nail plate pressure, without periungual skin abnormality of each.        Neurological:      Sensory: No sensory deficit.      Motor: No tremor, atrophy or abnormal muscle tone.      Deep Tendon Reflexes: Reflexes are normal and symmetric.      Comments: Light touch present     Psychiatric:         Attention and Perception: She is attentive.         Mood and Affect: Mood is not anxious. Affect is not inappropriate.         Speech: She is communicative. Speech is not slurred.         Behavior: Behavior is not combative.       08/28/2024    Left foot with purulence             Assessment:       Encounter Diagnoses   Name Primary?    Toe pain, bilateral Yes    Ingrown nail     Onychomycosis due to dermatophyte     Abscess of great toe, left            Plan:       Kristin was seen today for nail problem.    Diagnoses and all orders for this visit:    Toe pain, bilateral  -     Aerobic culture  -     Culture, Anaerobic    Ingrown nail    Onychomycosis due to dermatophyte    Abscess of great toe, left  -     Aerobic culture  -     Culture, Anaerobic    Other orders  -      tobramycin sulfate 0.3% (TOBREX) 0.3 % ophthalmic solution; Apply 1-2 drops to wound beds twice daily  -     Incision and Drainage        I counseled the patient on her conditions, their implications and medical management.    In depth conversation on the treatment of ingrown nail; partial nail avulsion vs chemical matrixectomy vs conservative treatment of soaking and nail trimming.  Recommend chemical matrixectomy in the future when no signs of infection noted    Informed patient that many nail problems can be prevented by wearing the right shoes and trimming your nails properly.   The right shoes: Patient is to wear shoes that are supportive and roomy enough for toes to wiggle. Look for shoes made of natural materials such as leather, which allow  feet to breathe.   Proper trimming: To avoid problems, she was instructed to trim toenails straight across without cutting down into the corners.      Discussed all treatment options such as topical, oral, laser treatments vs surgical removal of nail permanent vs non-permanent in detail pertaining to nail fungus. Instructed patient on the importance of keeping fungus off of skin while treating nails. Patient instructed to use absorbent cotton socks and change them if they become sweaty; or wear an open-toe shoe or sandal. Wash the feet at least once a day with soap and water. Patient wants to try OTC topicals. Instructed patient that it takes time for symptoms to completely dissipate. Patient instructed to use lysol or over-the-counter antifungal powders or sprays to shoes daily and allow them to air dry, switching shoes from every other day would be optimal. Patient is to avoid barefoot walking in  high-risk environments (public showers, gyms and locker rooms) may prevent future infections.     RTC in 2 weeks, sooner PRN or if patient wishes to pursue ingrown nail procedure        Incision and Drainage    Date/Time: 8/28/2024 2:30 PM    Performed by: Ghislaine Hernandez  DPM  Authorized by: Ghislaine Hernandez DPM    Consent Done?:  Yes (Verbal)    Type:  Abscess  Body area:  Lower extremity  Location details:  Left big toe  Scalpel size:  15  Incision type:  Elliptical  Incision depth: subcutaneous    Complexity:  Simple  Drainage:  Pus and serosanguinous  Drainage amount:  Scant  Wound treatment:  Wound left open and drainage (iodosorb, toe football)  Patient tolerance:  Patient tolerated the procedure well with no immediate complications    Detailed home care instructions discussed and dispensed. Detailed wound care instructions discussed and dispensed on the use of Tobrex drops BID

## 2024-08-28 ENCOUNTER — OFFICE VISIT (OUTPATIENT)
Dept: PODIATRY | Facility: CLINIC | Age: 79
End: 2024-08-28
Payer: MEDICARE

## 2024-08-28 ENCOUNTER — OFFICE VISIT (OUTPATIENT)
Dept: FAMILY MEDICINE | Facility: CLINIC | Age: 79
End: 2024-08-28
Payer: MEDICARE

## 2024-08-28 VITALS — WEIGHT: 140.63 LBS | BODY MASS INDEX: 26.55 KG/M2 | HEIGHT: 61 IN

## 2024-08-28 DIAGNOSIS — L60.0 INGROWN NAIL: ICD-10-CM

## 2024-08-28 DIAGNOSIS — L02.612 ABSCESS OF GREAT TOE, LEFT: ICD-10-CM

## 2024-08-28 DIAGNOSIS — R39.9 UTI SYMPTOMS: Primary | ICD-10-CM

## 2024-08-28 DIAGNOSIS — M79.674 TOE PAIN, BILATERAL: Primary | ICD-10-CM

## 2024-08-28 DIAGNOSIS — B35.1 ONYCHOMYCOSIS DUE TO DERMATOPHYTE: ICD-10-CM

## 2024-08-28 DIAGNOSIS — M79.675 TOE PAIN, BILATERAL: Primary | ICD-10-CM

## 2024-08-28 PROCEDURE — 99999 PR PBB SHADOW E&M-EST. PATIENT-LVL IV: CPT | Mod: PBBFAC,,, | Performed by: PODIATRIST

## 2024-08-28 PROCEDURE — 1159F MED LIST DOCD IN RCRD: CPT | Mod: CPTII,S$GLB,, | Performed by: PODIATRIST

## 2024-08-28 PROCEDURE — 1126F AMNT PAIN NOTED NONE PRSNT: CPT | Mod: CPTII,S$GLB,, | Performed by: PODIATRIST

## 2024-08-28 PROCEDURE — 87070 CULTURE OTHR SPECIMN AEROBIC: CPT | Performed by: PODIATRIST

## 2024-08-28 PROCEDURE — 1160F RVW MEDS BY RX/DR IN RCRD: CPT | Mod: CPTII,S$GLB,, | Performed by: PODIATRIST

## 2024-08-28 PROCEDURE — 10060 I&D ABSCESS SIMPLE/SINGLE: CPT | Mod: S$GLB,,, | Performed by: PODIATRIST

## 2024-08-28 PROCEDURE — 99214 OFFICE O/P EST MOD 30 MIN: CPT | Mod: 25,S$GLB,, | Performed by: PODIATRIST

## 2024-08-28 PROCEDURE — 87075 CULTR BACTERIA EXCEPT BLOOD: CPT | Performed by: PODIATRIST

## 2024-08-28 RX ORDER — TOBRAMYCIN 3 MG/ML
SOLUTION/ DROPS OPHTHALMIC
Qty: 5 ML | Refills: 2 | Status: SHIPPED | OUTPATIENT
Start: 2024-08-28

## 2024-08-28 NOTE — PATIENT INSTRUCTIONS
"  Ingrown Toenail, Excised  An ingrown toenail occurs when the nail grows sideways into the skin alongside the nail. This can cause pain, especially when wearing tight shoes. It can also lead to an infection with redness and swelling.  The side of the nail will need to be removed in order to stop the pain and release any infection present. If there is a lot of redness and swelling, then an antibiotic may also be used. The redness and pain should begin to go away within 48 hours. It will take about two weeks for the exposed nail bed to become dry and all of the swelling to go down.  If only the side of the nail was removed it will begin to grow back in a few months. To prevent recurrence, that side of nail bed may be treated with a strong chemical to prevent the nail from regrowing ("ablation").  Home care  The following guidelines will help you care for your toe at home:  beginning in 24 hours:  Clean the toes separate from your body with warm running water and antibacterial soap such as dial soap or saline wound spray  Clean any remaining crust away with soap and water using a cotton-tipped applicator. And DRY completely dry  Apply tobrex antibiotic drops (1-2 drops) to the toe twice daily.  Cover with a breathable bandage or until the exposed nail bed is dry and there is no more drainage.  Change the dressing daily, or whenever it becomes wet or dirty.  If you were prescribed antibiotics, take them as directed until they are all gone.  Wear comfortable shoes with a lot of toe room, or open-toe sandals, while your toe is healing.  You may use acetaminophen or ibuprofen to control pain, unless another medicine was prescribed. If you have chronic liver or kidney disease or ever had a stomach ulcer or GI bleeding, talk with your doctor before using these medicines.  Prevention  To prevent ingrown toenails:  Wear shoes that fit well. Avoid shoes that pinch the toes together.  When you trim your toenails, do not cut " them too short. Cut straight across at the top and do not round the edges.  Do not use a sharp object to clean under your nail since this might cause an infection.  At first signs of a recurrence, insert a small piece of cotton under that side of the nail to help it grow out straight.  Follow-up care  Follow up with your doctor or this facility as advised by our staff. If the ingrown toenail recurs, follow up with a podiatrist for nail bed ablation.  When to seek medical advice  Call your health care provider right away if any of the following occur:  Increasing redness, pain or swelling of the toe  Red streaks in the skin leading away from the wound  Continued pus or fluid drainage for more than 24 hours  Fever of 100.4º F (38º C) or higher, or as directed by your health care provider  © 7554-6726 [employer]. [address] [place] [address]. All rights reserved. This information is not intended as a substitute for professional medical care. Always follow your healthcare professional's instructions.      Over the counter pain creams: Voltaren Gel, Biofreeze, Bengay, tiger balm, two old goat, lidocaine gel,  Absorbine Veterinary Liniment Gel Topical Analgesic Sore Muscle and Joint Pain Relief    Recommend lotions: eucerin, eucerin for diabetics, aquaphor, A&D ointment, gold bond for diabetics, sween, Nas's Bees all purpose baby ointment,  urea 40 with aloe or SkinIntegra rapid crack repair (found on amazon.com)    Shoe recommendations: (try 6pm.com, zappos.OutboundEngine , [employer].OutboundEngine, or shoes.OutboundEngine for discounted prices) you can visit varsity shoes in [place], DSW shoes in [place]  or [facility] in the [facility] (there are also several shoe brand outlets in the [facility])    ONLY purchase stability style tennis shoes AVOID flex, foam, free, yoga mat style shoes or shoes that claim to feel like clouds  If you have a flatter foot purchase shoes for PRONATION  If you have a high arch  purchase shoes for SUPINATION    Shoe examples:    Asics (GT or gel foundations, gel-kayano-30), new balance stability type shoes (such as the 940 series or the [id number]), gibsonony (Guide 16, stabil c3),  Mahmood (GTS or Beast or   transcend), propet, HokaOne (makayla 7, arahi, mena) Vincent (tennis shoes and boots)    Sofft Brand (women) Katiana&Stu (men), clarks, crocs, aerosoles, naturalizers, SAS, ecco, born, emily choi, rockports (dress shoes)    Vionic, burkenstocks, fitflops, propet, taos, baretraps, Hoka or vionic recovery slides/sandals (sandals)    Hoka or vionic recovery slides/sandals, crocs, propet (house shoes)      Nail Home remedy:  Vicks Vapor rub or Emuaid to nails for easier manageability      Occasional soaks for 15-20 mins in luke warm water with 1 cup of listerine and 1 cup of apple cider vinegar are ok You may add several drops of oil of oregano or tea tree oil as well      Wearing Proper Shoes                    You walk on your feet every day, forcing them to support the weight of your body. Repeated stress on your feet can cause damage over time. The right shoes can help protect your feet. The wrong shoes can cause more foot problems. Read the information below to help you find a shoe that fits your foot needs.      A good shoe fit will cover your foot outline. A shoe that doesnt cover the outline is a bad fit.   Whats your foot shape?  To get a good fit, you need to know the shape of your foot. Do this simple test: While standing, place your foot on a piece of paper and trace around it. Is your foot straight or curved? Do you have a foot problem, such as a bunion, that causes your foot outline to show a bulge on the side of your big toe?  Finding your fit  Bring your foot outline to the shoe store to help you find the right shoe. Place a shoe you like on top of the outline to see if it matches the shape. The shoe should cover the outline. (If you have a bunion, the shoe may not cover  the bulge on the outline. Look for soft leather shoes to stretch over the bunion.) Once youve found a pair of proper shoes, put them on. Walk around. Be sure the shoes dont rub or pinch. If the shoes feel good, youve found your fit!  The right shoe for you  A good shoe has features that provide comfort and support. It must also be the right size and shape for your feet. Look for a shoe made of breathable fabric and lining, such as leather or canvas. Be sure that shoes have enough tread to prevent slipping. Go to a good shoe store for help finding the right shoe.  Good shoe features  An ideal shoe has the following:  Laces for support. If tying laces is a problem for you, try shoes with Velcro fasteners or oscar.  A front of the shoe (toe box) with ½ inch space in front of your longest toes.  An arch shape that supports your foot.  No more than 1½ inches of heel.  A stiff, snug back of the shoe to keep your foot from sliding around.  A smooth lining with no rough seams.  Shoe shopping tips  Below are some dos and donts for when you go to the shoe store.  Do:  Select the shoes that feel right. Wear them around the house. Then bring them to your foot healthcare provider to check for fit. If they dont fit well, return them.  Shop late in the day, when your feet will be slightly bigger.  Each time you buy shoes, have both your feet measured while you are standing. Foot size changes with time.  Pick shoes to suit their purpose. High heels are OK for an occasional night on the town. But for everyday wear, choose a more sensible shoe.  Try on shoes while wearing any inserts specially made for your feet (orthoses).  Try on both the right and left shoes. If your feet are different sizes, pick a pair that fits the larger foot.  Dont:  Dont buy shoes based on shoe size alone. Always try on shoes, as sizes differ from brand to brand and within brands.  Dont expect shoes to break in. If they dont fit at the store,  dont buy them.  Dont buy a shoe that doesnt match your foot shape.  What about socks?  Always wear socks with shoes. Socks help absorb sweat and reduce friction and blistering. When shopping for shoes, choose soft, padded socks with seams that dont irritate your feet.  If you have foot problems  Some foot problems cause deformities. This can make it hard to find a good fit. Look for shoes made of soft leather to stretch over the deformity. If you have bunions, buy shoes with a wider toe box. To fit hammertoes, look for shoes with a tall toe box. If you have arch problems, you may need inserts. In some cases, youll need to have custom footwear or orthoses made for your feet.  Suggested footwear  Ask your healthcare provider what kind of footwear you need. He or she may recommend a certain brand or shoe store.  Date Last Reviewed: 8/1/2016  © 2513-5873 [employer]. [address] [place] [address]. All rights reserved. This information is not intended as a substitute for professional medical care. Always follow your healthcare professional's instructions.        Step-by-Step:  Inspecting Your Feet   Date Last Reviewed: 10/1/2016  © 5836-7101 [employer]. [address] [place] [address]. All rights reserved. This information is not intended as a substitute for professional medical care. Always follow your healthcare professional's instructions.
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
## 2024-08-29 ENCOUNTER — LAB VISIT (OUTPATIENT)
Dept: LAB | Facility: HOSPITAL | Age: 79
End: 2024-08-29
Attending: INTERNAL MEDICINE
Payer: MEDICARE

## 2024-08-29 ENCOUNTER — PATIENT MESSAGE (OUTPATIENT)
Dept: FAMILY MEDICINE | Facility: CLINIC | Age: 79
End: 2024-08-29
Payer: MEDICARE

## 2024-08-29 DIAGNOSIS — N05.8 PRIMARY PAUCI-IMMUNE NECROTIZING AND CRESCENTIC GLOMERULONEPHRITIS: ICD-10-CM

## 2024-08-29 DIAGNOSIS — N05.7 PRIMARY PAUCI-IMMUNE NECROTIZING AND CRESCENTIC GLOMERULONEPHRITIS: ICD-10-CM

## 2024-08-29 LAB
BACTERIA #/AREA URNS HPF: ABNORMAL /HPF
BILIRUB UR QL STRIP: NEGATIVE
CLARITY UR: ABNORMAL
COLOR UR: ABNORMAL
CREAT UR-MCNC: 143 MG/DL (ref 15–325)
GLUCOSE UR QL STRIP: NEGATIVE
HGB UR QL STRIP: ABNORMAL
HYALINE CASTS #/AREA URNS LPF: 0 /LPF
KETONES UR QL STRIP: NEGATIVE
LEUKOCYTE ESTERASE UR QL STRIP: ABNORMAL
MICROSCOPIC COMMENT: ABNORMAL
NITRITE UR QL STRIP: NEGATIVE
PH UR STRIP: 6 [PH] (ref 5–8)
PROT UR QL STRIP: ABNORMAL
PROT UR-MCNC: 88 MG/DL
PROT/CREAT UR: 0.62 MG/G{CREAT} (ref 0–0.2)
RBC #/AREA URNS HPF: 60 /HPF (ref 0–4)
SP GR UR STRIP: 1.01 (ref 1–1.03)
URN SPEC COLLECT METH UR: ABNORMAL
UROBILINOGEN UR STRIP-ACNC: NEGATIVE EU/DL
WBC #/AREA URNS HPF: >100 /HPF (ref 0–5)
WBC CLUMPS URNS QL MICRO: ABNORMAL

## 2024-08-29 PROCEDURE — 84156 ASSAY OF PROTEIN URINE: CPT | Performed by: INTERNAL MEDICINE

## 2024-08-29 PROCEDURE — 81000 URINALYSIS NONAUTO W/SCOPE: CPT | Performed by: INTERNAL MEDICINE

## 2024-08-30 LAB — BACTERIA SPEC AEROBE CULT: NORMAL

## 2024-09-02 LAB — BACTERIA SPEC ANAEROBE CULT: NORMAL

## 2024-09-10 ENCOUNTER — PATIENT MESSAGE (OUTPATIENT)
Dept: PODIATRY | Facility: CLINIC | Age: 79
End: 2024-09-10
Payer: MEDICARE

## 2024-09-16 DIAGNOSIS — D84.9 IMMUNOSUPPRESSION: ICD-10-CM

## 2024-09-16 DIAGNOSIS — Z86.718 CURRENT LONG-TERM USE OF ANTICOAGULANT MEDICATION WITH HISTORY OF DEEP VENOUS THROMBOSIS (DVT): ICD-10-CM

## 2024-09-16 DIAGNOSIS — M79.10 MYALGIA: ICD-10-CM

## 2024-09-16 DIAGNOSIS — I77.82 ANTINEUTROPHILIC CYTOPLASMIC ANTIBODY (ANCA) VASCULITIS: ICD-10-CM

## 2024-09-16 DIAGNOSIS — J30.9 ALLERGIC RHINITIS, UNSPECIFIED SEASONALITY, UNSPECIFIED TRIGGER: ICD-10-CM

## 2024-09-16 DIAGNOSIS — E03.9 ACQUIRED HYPOTHYROIDISM: ICD-10-CM

## 2024-09-16 DIAGNOSIS — Z79.01 CURRENT LONG-TERM USE OF ANTICOAGULANT MEDICATION WITH HISTORY OF DEEP VENOUS THROMBOSIS (DVT): ICD-10-CM

## 2024-09-16 RX ORDER — LEVOCETIRIZINE DIHYDROCHLORIDE 5 MG/1
2.5 TABLET, FILM COATED ORAL NIGHTLY
Qty: 15 TABLET | Refills: 11 | Status: CANCELLED | OUTPATIENT
Start: 2024-09-16 | End: 2025-09-16

## 2024-09-16 RX ORDER — LEVOTHYROXINE SODIUM 25 UG/1
25 TABLET ORAL DAILY
Qty: 90 TABLET | Refills: 3 | Status: CANCELLED | OUTPATIENT
Start: 2024-09-16

## 2024-09-17 RX ORDER — PREDNISONE 2.5 MG/1
5 TABLET ORAL DAILY
Qty: 60 TABLET | Refills: 3 | Status: SHIPPED | OUTPATIENT
Start: 2024-09-17

## 2024-09-17 RX ORDER — CYCLOBENZAPRINE HCL 5 MG
5 TABLET ORAL NIGHTLY PRN
Qty: 20 TABLET | Refills: 2 | Status: SHIPPED | OUTPATIENT
Start: 2024-09-17

## 2024-09-17 RX ORDER — ATOVAQUONE 750 MG/5ML
1500 SUSPENSION ORAL DAILY
Qty: 300 ML | Refills: 3 | Status: SHIPPED | OUTPATIENT
Start: 2024-09-17

## 2024-09-26 ENCOUNTER — OFFICE VISIT (OUTPATIENT)
Dept: PODIATRY | Facility: CLINIC | Age: 79
End: 2024-09-26
Payer: MEDICARE

## 2024-09-26 VITALS — BODY MASS INDEX: 26.55 KG/M2 | WEIGHT: 140.63 LBS | HEIGHT: 61 IN

## 2024-09-26 DIAGNOSIS — M79.675 TOE PAIN, BILATERAL: ICD-10-CM

## 2024-09-26 DIAGNOSIS — L60.0 INGROWN NAIL: ICD-10-CM

## 2024-09-26 DIAGNOSIS — M79.674 TOE PAIN, BILATERAL: ICD-10-CM

## 2024-09-26 DIAGNOSIS — L02.612 ABSCESS OF GREAT TOE, LEFT: Primary | ICD-10-CM

## 2024-09-26 PROCEDURE — 1126F AMNT PAIN NOTED NONE PRSNT: CPT | Mod: CPTII,S$GLB,, | Performed by: PODIATRIST

## 2024-09-26 PROCEDURE — 1159F MED LIST DOCD IN RCRD: CPT | Mod: CPTII,S$GLB,, | Performed by: PODIATRIST

## 2024-09-26 PROCEDURE — 99999 PR PBB SHADOW E&M-EST. PATIENT-LVL IV: CPT | Mod: PBBFAC,,, | Performed by: PODIATRIST

## 2024-09-26 PROCEDURE — 3288F FALL RISK ASSESSMENT DOCD: CPT | Mod: CPTII,S$GLB,, | Performed by: PODIATRIST

## 2024-09-26 PROCEDURE — 1101F PT FALLS ASSESS-DOCD LE1/YR: CPT | Mod: CPTII,S$GLB,, | Performed by: PODIATRIST

## 2024-09-26 PROCEDURE — 99213 OFFICE O/P EST LOW 20 MIN: CPT | Mod: S$GLB,,, | Performed by: PODIATRIST

## 2024-09-26 PROCEDURE — 1160F RVW MEDS BY RX/DR IN RCRD: CPT | Mod: CPTII,S$GLB,, | Performed by: PODIATRIST

## 2024-09-30 ENCOUNTER — HOSPITAL ENCOUNTER (OUTPATIENT)
Dept: RADIOLOGY | Facility: CLINIC | Age: 79
Discharge: HOME | End: 2024-09-30
Payer: MEDICARE

## 2024-09-30 DIAGNOSIS — Z92.89 HISTORY OF BONE DENSITY STUDY: ICD-10-CM

## 2024-09-30 DIAGNOSIS — E21.3 HYPERPARATHYROIDISM, UNSPECIFIED: ICD-10-CM

## 2024-09-30 PROCEDURE — 77080 DXA BONE DENSITY AXIAL: CPT | Mod: TC,PO

## 2024-09-30 PROCEDURE — 77080 DXA BONE DENSITY AXIAL: CPT | Mod: 26,,, | Performed by: INTERNAL MEDICINE

## 2024-09-30 NOTE — PROGRESS NOTES
Subjective:      Patient ID: Kristin Goodman is a 79 y.o. female.    Chief Complaint: Ingrown Toenail    Kristin Goodman is a 79 y.o. female who presents to the clinic complaining of painful ingrown toenail on both feet. Patient has not attempted self treatment.  This is a  recurrent problem. Seen for similar issue in the past. Patient denies history of trauma. Patient  denies purulent drainage.    09/26/2024 Patient returns to of left hallux abscess.  She has been taking care of the toe as discussed on last encounter, relates significant improvement however the the nail still he is following towards the skin.  No further pedal complaints.    Current shoe gear: casual sandal    Patient Active Problem List   Diagnosis    Hypothyroid    Primary hypertension    Mitral valve regurgitation    Chest pain    Syncope    CAREY (dyspnea on exertion)    Chronic diastolic heart failure    DONAVAN (obstructive sleep apnea)    Sleep arousal disorder    Acute medial meniscal tear, right, initial encounter    Impaired mobility    S/P meniscectomy    Other nonthrombocytopenic purpura    Atherosclerosis of renal artery    Alteration in instrumental activities of daily living (IADL)    Other specified anemias    Antineutrophilic cytoplasmic antibody (ANCA) vasculitis    Moderate malnutrition    Dysphagia    Immunosuppression due to drug therapy    Gastric reflux    Hematuria syndrome    Dependence on hemodialysis    Anemia due to chronic kidney disease    Dizzy spells    Acute deep vein thrombosis (DVT) of non-extremity vein - subclavian    Secondary hyperparathyroidism of renal origin    Lightheadedness    Current long-term use of anticoagulant medication with history of deep venous thrombosis (DVT)    ESRD (end stage renal disease)    Other stimulant dependence, uncomplicated    UTI symptoms       Current Outpatient Medications on File Prior to Visit   Medication Sig Dispense Refill    amLODIPine (NORVASC) 10 MG tablet Take 1 tablet (10 mg  total) by mouth once daily. 90 tablet 3    apixaban (ELIQUIS) 5 mg Tab Take 1 tablet (5 mg total) by mouth 2 (two) times daily. 60 tablet 1    atovaquone (MEPRON) 750 mg/5 mL Susp oral liquid Take 10 mLs (1,500 mg total) by mouth once daily. 300 mL 3    cyclobenzaprine (FLEXERIL) 5 MG tablet Take 1 tablet (5 mg total) by mouth nightly as needed for Muscle spasms. 20 tablet 2    fluticasone propionate (FLONASE) 50 mcg/actuation nasal spray 1 spray (50 mcg total) by Each Nostril route 2 (two) times daily. 16 g 3    levocetirizine (XYZAL) 5 MG tablet Take 0.5 tablets (2.5 mg total) by mouth every evening. For sinus 15 tablet 11    levothyroxine (EUTHYROX) 25 MCG tablet Take 1 tablet (25 mcg total) by mouth once daily. 90 tablet 3    methoxy peg-epoetin beta (MIRCERA INJ) 50 mcg.      predniSONE (DELTASONE) 2.5 MG tablet Take 2 tablets (5 mg total) by mouth once daily. 60 tablet 3    predniSONE (DELTASONE) 5 MG tablet Take 1 tablet by mouth.      RENVELA 800 mg Tab Take 1 tablet (800 mg total) by mouth 3 (three) times daily. 30 tablet 11    tobramycin sulfate 0.3% (TOBREX) 0.3 % ophthalmic solution Apply 1-2 drops to wound beds twice daily 5 mL 2    torsemide (DEMADEX) 100 MG Tab Take 100 mg by mouth once daily.      albuterol (VENTOLIN HFA) 90 mcg/actuation inhaler Inhale 2 puffs into the lungs every 6 (six) hours as needed for Shortness of Breath. Rescue 18 g 3    carvediloL (COREG) 12.5 MG tablet Take 1 tablet (12.5 mg total) by mouth 2 (two) times daily. 60 tablet 11    QUEtiapine (SEROQUEL) 25 MG Tab Take 1 tablet (25 mg total) by mouth every evening. 30 tablet 11     Current Facility-Administered Medications on File Prior to Visit   Medication Dose Route Frequency Provider Last Rate Last Admin    vancomycin - pharmacy to dose   Intravenous pharmacy to manage frequency John Hudson MD           Review of patient's allergies indicates:   Allergen Reactions    Ampicillin     Lactose Diarrhea    Peaches  [peach (prunus persica)] Other (See Comments)     Pt unable to state type of reaction. Information obtained from daughter who states she was informed of allergy from patient.    Penicillins      Other reaction(s): Hives, anaphylaxis    Sulfa (sulfonamide antibiotics) Rash and Hives       Past Surgical History:   Procedure Laterality Date    ARTHROSCOPIC CHONDROPLASTY OF KNEE JOINT Right 12/21/2021    Procedure: ARTHROSCOPY, KNEE, WITH CHONDROPLASTY;  Surgeon: Elly Sullivan MD;  Location: [facility] OR;  Service: Orthopedics;  Laterality: Right;    COLONOSCOPY N/A 9/28/2020    Procedure: COLONOSCOPY;  Surgeon: Jaylan Flynn MD;  Location: [facility] ENDO;  Service: Endoscopy;  Laterality: N/A;    ESOPHAGOGASTRODUODENOSCOPY N/A 11/14/2022    Procedure: EGD (ESOPHAGOGASTRODUODENOSCOPY);  Surgeon: Asaf Hahn MD;  Location: [facility] (2ND FLR);  Service: Endoscopy;  Laterality: N/A;    KNEE ARTHROSCOPY W/ MENISCECTOMY Right 12/21/2021    Procedure: ARTHROSCOPY, KNEE, WITH MENISCECTOMY;  Surgeon: Elly Sullivan MD;  Location: [facility] OR;  Service: Orthopedics;  Laterality: Right;  general, regional w catheter, adductor, josefina 50cc,     OOPHORECTOMY      PLACEMENT OF ARTERIOVENOUS GRAFT Left 9/7/2023    Procedure: INSERTION, GRAFT, ARTERIOVENOUS;  Surgeon: John Hudson MD;  Location: [facility] OR;  Service: Vascular;  Laterality: Left;  RN PREOP 8/31/2023 TYPE&SCREEN---done 9/6/2023 9:00 AM-----HAS CARDS CLEARANCE    SYNOVECTOMY OF KNEE Right 12/21/2021    Procedure: SYNOVECTOMY, KNEE;  Surgeon: Elly Sullivan MD;  Location: [facility] OR;  Service: Orthopedics;  Laterality: Right;    TOTAL ABDOMINAL HYSTERECTOMY      19 yrs ago       Family History   Problem Relation Name Age of Onset    Arthritis Mother      Early death Mother  56    Hypertension Mother      Diabetes Father      Early death Father  62    Hypertension Father      Stroke Father      Arthritis Sister      Cancer Sister          cervical    Early death  Sister  63    Heart disease Sister          anyuresem    Hypertension Sister      Hyperlipidemia Sister      Alzheimer's disease Sister      Rheum arthritis Sister      Arthritis Brother      Cancer Brother          lung cancer    Early death Brother  59    Heart disease Brother          heart attack    Hypertension Brother      Vision loss Brother      Prostate cancer Brother      Hypertension Daughter      Breast cancer Other niece        Social History     Socioeconomic History    Marital status:    Tobacco Use    Smoking status: Former     Current packs/day: 0.50     Average packs/day: 0.5 packs/day for 6.0 years (3.0 ttl pk-yrs)     Types: Cigarettes    Smokeless tobacco: Never    Tobacco comments:     Quit ~ 30 years ago   Substance and Sexual Activity    Alcohol use: No    Drug use: No    Sexual activity: Never     Partners: Male     Social Drivers of Health     Financial Resource Strain: Low Risk  (8/28/2024)    Overall Financial Resource Strain (CARDIA)     Difficulty of Paying Living Expenses: Not hard at all   Food Insecurity: No Food Insecurity (8/28/2024)    Hunger Vital Sign     Worried About Running Out of Food in the Last Year: Never true     Ran Out of Food in the Last Year: Never true   Transportation Needs: No Transportation Needs (5/2/2024)    PRAPARE - Transportation     Lack of Transportation (Medical): No     Lack of Transportation (Non-Medical): No   Physical Activity: Insufficiently Active (8/28/2024)    Exercise Vital Sign     Days of Exercise per Week: 1 day     Minutes of Exercise per Session: 10 min   Stress: No Stress Concern Present (8/28/2024)    [employer] [place] [employer] - Occupational Stress Questionnaire     Feeling of Stress : Only a little   Housing Stability: Unknown (8/8/2023)    Housing Stability Vital Sign     Unable to Pay for Housing in the Last Year: No     Unstable Housing in the Last Year: No       Review of Systems   Constitutional: Negative for  "chills and fever.   Cardiovascular:  Negative for claudication and leg swelling.   Respiratory:  Negative for cough and shortness of breath.    Skin:  Positive for dry skin and nail changes. Negative for itching and rash.   Musculoskeletal:  Positive for arthritis, joint pain, muscle weakness, myalgias and stiffness. Negative for falls and joint swelling.   Gastrointestinal:  Negative for diarrhea, nausea and vomiting.   Neurological:  Negative for numbness, paresthesias, tremors and weakness.   Psychiatric/Behavioral:  Negative for altered mental status and hallucinations.            Objective:      Vitals:    09/26/24 1033   Weight: 63.8 kg (140 lb 10.5 oz)   Height: 5' 1" (1.549 m)   PainSc: 0-No pain         Physical Exam  Vitals and nursing note reviewed.   Constitutional:       General: She is not in acute distress.     Appearance: She is well-developed. She is not toxic-appearing or diaphoretic.      Comments: alert and oriented x 3.    Cardiovascular:      Pulses:           Dorsalis pedis pulses are 2+ on the right side and 2+ on the left side.        Posterior tibial pulses are 2+ on the right side and 2+ on the left side.      Comments:  Capillary refill time is within normal limits. Digital hair present.   Pulmonary:      Effort: No respiratory distress.   Musculoskeletal:         General: No deformity.      Right ankle: No swelling. No tenderness. No lateral malleolus, medial malleolus, AITF ligament, CF ligament or posterior TF ligament tenderness. Normal range of motion.      Right Achilles Tendon: No defects. Lo's test negative.      Left ankle: No swelling. No tenderness. No lateral malleolus, medial malleolus, AITF ligament, CF ligament or posterior TF ligament tenderness. Normal range of motion.      Left Achilles Tendon: No defects. Lo's test negative.      Right foot: No tenderness or bony tenderness.      Left foot: No tenderness or bony tenderness.      Comments: Fat pad atrophy to " heels and met heads bilateral    There is equinus deformity bilateral with decreased dorsiflexion at the ankle joint bilateral.     Decreased first MPJ range of motion both weightbearing and nonweightbearing, + crepitus observed the first MP joint, + dorsal flag sign. Moderate bunion deformity is observed .    Patient has hammertoes of digits 2-5 bilateral partially reducible    Feet:      Right foot:      Skin integrity: Erythema present.      Toenail Condition: Right toenails are ingrown. Fungal disease present.     Left foot:      Toenail Condition: Left toenails are ingrown. Fungal disease present.  Lymphadenopathy:      Comments: No lymphatic streaking     Skin:     General: Skin is warm and dry.      Coloration: Skin is not pale.      Findings: No bruising, burn, laceration or rash.      Nails: There is no clubbing.      Comments: Tenderness to medial hallux nail margin of each foot with ingrown nail plate and HPK buildup.      Toenails 1-5 bilaterally are thickened by 2-3 mm, discolored/yellowed, dystrophic, brittle with subungual debris. Tender to distal nail plate pressure, without periungual skin abnormality of each.        Neurological:      Sensory: No sensory deficit.      Motor: No tremor, atrophy or abnormal muscle tone.      Deep Tendon Reflexes: Reflexes are normal and symmetric.      Comments: Light touch present     Psychiatric:         Attention and Perception: She is attentive.         Mood and Affect: Mood is not anxious. Affect is not inappropriate.         Speech: She is communicative. Speech is not slurred.         Behavior: Behavior is not combative.       09/26/2024 08/28/2024    Left foot with purulence             Assessment:       Encounter Diagnoses   Name Primary?    Toe pain, bilateral     Ingrown nail     Abscess of great toe, left Yes             Plan:       Kristin was seen today for ingrown toenail.    Diagnoses and all orders for this visit:    Abscess of great toe,  left    Toe pain, bilateral    Ingrown nail          I counseled the patient on her conditions, their implications and medical management.    Abscess has resolved.    In depth conversation on the treatment of ingrown nail; partial nail avulsion vs chemical matrixectomy vs conservative treatment of soaking and nail trimming.  Chemical matrixectomy has been scheduled    Informed patient that many nail problems can be prevented by wearing the right shoes and trimming your nails properly.   The right shoes: Patient is to wear shoes that are supportive and roomy enough for toes to wiggle. Look for shoes made of natural materials such as leather, which allow  feet to breathe.   Proper trimming: To avoid problems, she was instructed to trim toenails straight across without cutting down into the corners.       RTC for scheduled ingrown nail procedure        Procedures

## 2024-10-04 ENCOUNTER — TELEPHONE (OUTPATIENT)
Dept: ENDOSCOPY | Facility: HOSPITAL | Age: 79
End: 2024-10-04
Payer: MEDICARE

## 2024-10-04 ENCOUNTER — TELEPHONE (OUTPATIENT)
Dept: GASTROENTEROLOGY | Facility: CLINIC | Age: 79
End: 2024-10-04

## 2024-10-04 ENCOUNTER — LAB VISIT (OUTPATIENT)
Dept: LAB | Facility: HOSPITAL | Age: 79
End: 2024-10-04
Payer: MEDICARE

## 2024-10-04 ENCOUNTER — OFFICE VISIT (OUTPATIENT)
Dept: GASTROENTEROLOGY | Facility: CLINIC | Age: 79
End: 2024-10-04
Payer: MEDICARE

## 2024-10-04 VITALS
DIASTOLIC BLOOD PRESSURE: 78 MMHG | BODY MASS INDEX: 26.47 KG/M2 | HEIGHT: 61 IN | SYSTOLIC BLOOD PRESSURE: 144 MMHG | HEART RATE: 74 BPM | WEIGHT: 140.19 LBS

## 2024-10-04 VITALS — BODY MASS INDEX: 26.43 KG/M2 | HEIGHT: 61 IN | WEIGHT: 140 LBS

## 2024-10-04 DIAGNOSIS — Z01.812 PRE-PROCEDURE LAB EXAM: Primary | ICD-10-CM

## 2024-10-04 DIAGNOSIS — Z12.11 ENCOUNTER FOR SCREENING COLONOSCOPY: Primary | ICD-10-CM

## 2024-10-04 DIAGNOSIS — K92.1 MELENA: Primary | ICD-10-CM

## 2024-10-04 DIAGNOSIS — D64.9 ANEMIA, UNSPECIFIED TYPE: ICD-10-CM

## 2024-10-04 DIAGNOSIS — D50.9 IRON DEFICIENCY ANEMIA, UNSPECIFIED IRON DEFICIENCY ANEMIA TYPE: Primary | ICD-10-CM

## 2024-10-04 DIAGNOSIS — K92.1 MELENA: ICD-10-CM

## 2024-10-04 DIAGNOSIS — D50.9 IRON DEFICIENCY ANEMIA, UNSPECIFIED IRON DEFICIENCY ANEMIA TYPE: ICD-10-CM

## 2024-10-04 LAB
BASOPHILS # BLD AUTO: 0.05 K/UL (ref 0–0.2)
BASOPHILS NFR BLD: 0.5 % (ref 0–1.9)
DIFFERENTIAL METHOD BLD: ABNORMAL
EOSINOPHIL # BLD AUTO: 0.2 K/UL (ref 0–0.5)
EOSINOPHIL NFR BLD: 1.5 % (ref 0–8)
ERYTHROCYTE [DISTWIDTH] IN BLOOD BY AUTOMATED COUNT: 15.2 % (ref 11.5–14.5)
FERRITIN SERPL-MCNC: 1958 NG/ML (ref 20–300)
HCT VFR BLD AUTO: 37.4 % (ref 37–48.5)
HGB BLD-MCNC: 11.6 G/DL (ref 12–16)
IMM GRANULOCYTES # BLD AUTO: 0.06 K/UL (ref 0–0.04)
IMM GRANULOCYTES NFR BLD AUTO: 0.5 % (ref 0–0.5)
IRON SERPL-MCNC: 59 UG/DL (ref 30–160)
LYMPHOCYTES # BLD AUTO: 1.8 K/UL (ref 1–4.8)
LYMPHOCYTES NFR BLD: 16.2 % (ref 18–48)
MCH RBC QN AUTO: 31.1 PG (ref 27–31)
MCHC RBC AUTO-ENTMCNC: 31 G/DL (ref 32–36)
MCV RBC AUTO: 100 FL (ref 82–98)
MONOCYTES # BLD AUTO: 1.4 K/UL (ref 0.3–1)
MONOCYTES NFR BLD: 12.9 % (ref 4–15)
NEUTROPHILS # BLD AUTO: 7.5 K/UL (ref 1.8–7.7)
NEUTROPHILS NFR BLD: 68.4 % (ref 38–73)
NRBC BLD-RTO: 1 /100 WBC
PLATELET # BLD AUTO: 237 K/UL (ref 150–450)
PMV BLD AUTO: 9.7 FL (ref 9.2–12.9)
RBC # BLD AUTO: 3.73 M/UL (ref 4–5.4)
SATURATED IRON: 19 % (ref 20–50)
TOTAL IRON BINDING CAPACITY: 309 UG/DL (ref 250–450)
TRANSFERRIN SERPL-MCNC: 209 MG/DL (ref 200–375)
WBC # BLD AUTO: 11.02 K/UL (ref 3.9–12.7)

## 2024-10-04 PROCEDURE — 83540 ASSAY OF IRON: CPT

## 2024-10-04 PROCEDURE — 36415 COLL VENOUS BLD VENIPUNCTURE: CPT

## 2024-10-04 PROCEDURE — 85025 COMPLETE CBC W/AUTO DIFF WBC: CPT

## 2024-10-04 PROCEDURE — 82728 ASSAY OF FERRITIN: CPT

## 2024-10-04 PROCEDURE — 99999 PR PBB SHADOW E&M-EST. PATIENT-LVL III: CPT | Mod: PBBFAC,,,

## 2024-10-04 NOTE — TELEPHONE ENCOUNTER
Spoke with patient's daughter over the phone to discuss lab results.   Patient's Hgb today was 11.6, up from 8.9 a month ago. Her ferritin level was elevated. Per daughter's knowledge, patient is not taking any iron supplementation, but states she may receive as needed when she goes to dialysis. Discussed with her that dual scopes are not required since her labs have improved. However, we can proceed as scheduled if they prefer. Patient was due for colonoscopy next year anyway. Pt's daughter will discuss with patient and her nephrologist.

## 2024-10-04 NOTE — TELEPHONE ENCOUNTER
"----- Message from Suzy Hernandez PA-C sent at 10/4/2024 10:08 AM CDT -----  Regarding: EGD/Colonoscopy  Procedure: EGD/Colonoscopy    Diagnosis: Iron deficiency anemia and Melena    Procedure Timing: Within 4 weeks (Urgent)    #If within 4 weeks selected, please wilbert as high priority#    #If greater than 12 weeks, please select "5-12 weeks" and delay sending until 3 months prior to requested date#     Location: Hospital Based ([facility]-Endo,  endo, [facility])    Additional Scheduling Information: Advanced cardiac or pulmonary disease    Prep Specifications:Standard prep    Is the patient taking a GLP-1 Agonist:no    Have you attached a patient to this message: yes  "

## 2024-10-04 NOTE — PROGRESS NOTES
"    [facility] Consultation Note    Reason for Consult:  The primary encounter diagnosis was Melena. A diagnosis of Anemia, unspecified type was also pertinent to this visit.    PCP:   Lori Hernandez       Referring MD:  No referring provider defined for this encounter.    HPI:  This is a 79 y.o. female with Hx of sepsis due to pneumonia, DONAVAN, HTN, diastolic HF, H.pylori, and hypothyroidism here for evaluation of intermittent melena for the past month. States she has noticed black stools and sometimes dark brown. Pt reports 2-3 BM/day ranging from soft to formed. Denies BRBPR. Also reports intermittent, generalized abdominal pain prior to BM and is relieved with BM. Last colonoscopy in 2020 with diverticulosis, hemorrhoids, and recommended repeat in 5 years. She had EGD in 2022 to place NG tube and was unremarkable. Pt denies N/V, dysphagia, reflux, regurgitation, bloody stools, rectal bleeding, or unintentional weight loss. Denies NSAID use. Denies tobacco and alcohol use.     Objective Findings:    Vital Signs:  BP (!) 144/78   Pulse 74   Ht 5' 1" (1.549 m)   Wt 63.6 kg (140 lb 3.4 oz)   BMI 26.49 kg/m²   Body mass index is 26.49 kg/m².    Physical Exam:  General Appearance: Well appearing in no acute distress  Abdomen: Soft, non tender, non distended in all four quadrants. No hepatosplenomegaly, ascites, or mass.    I have personally reviewed labs and imaging results.     CBC (8/29/24):   Hgb 8.9  Hct 28.8    Colonoscopy (09/28/2020):  - Non-bleeding internal hemorrhoids.   - Mild diverticulosis in the sigmoid colon. There was no evidence of diverticular bleeding.   - One 3 mm polyp in the cecum, removed with a cold snare. Resected and retrieved.   - One 8 mm polyp in the transverse colon, removed with a cold snare. Resected and retrieved.   Recommendation:      - Await pathology results.                        - Repeat colonoscopy in 5 years     Assessment:  1. Melena    2. Anemia, " unspecified type       This is a 79 y.o. female with Hx of sepsis due to pneumonia, DONAVAN, HTN, diastolic HF, H.pylori, and hypothyroidism here for evaluation of intermittent melena for the past month. Denies BRBPR. Last CBC a month ago with drop in H/H.     - Ordered repeat labs  - Ordered EGD/Colonscopy     Order summary:  Orders Placed This Encounter    CBC Auto Differential    Ferritin    Iron and TIBC     Thank you so much for allowing me to participate in the care of Joann P Reuben Sarah Abukhader, PA-C [facility]

## 2024-10-04 NOTE — TELEPHONE ENCOUNTER
Spoke to patient to schedule procedure(s) Colonoscopy/EGD       Physician to perform procedure(s) Dr. JAYLA Mitchell  Date of Procedure (s) 10/17/2024  Arrival Time 11:00 AM Lab 12:15 PM Check in  Time of Procedure(s) 1:15pm  Location of Procedure(s) [facility] 2nd Floor   Type of Rx Prep sent to patient: PEG  Instructions provided to patient via [facility]    Patient was informed on the following information and verbalized understanding. Screening questionnaire reviewed with patient and complete. If procedure requires anesthesia, a responsible adult needs to be present to accompany the patient home, patient cannot drive after receiving anesthesia. Appointment details are tentative, especially check-in time. Patient will receive a prep-op call 7 days prior to confirm check-in time for procedure. If applicable the patient should contact their pharmacy to verify Rx for procedure prep is ready for pick-up. Patient was advised to call the scheduling department at [phone] if pharmacy states no Rx is available. Patient was advised to call the endoscopy scheduling department if any questions or concerns arise.      [facility] Scheduling Department

## 2024-10-04 NOTE — TELEPHONE ENCOUNTER
"----- Message from Suzy Hernandez PA-C sent at 10/4/2024 10:08 AM CDT -----  Regarding: EGD/Colonoscopy  Procedure: EGD/Colonoscopy    Diagnosis: Iron deficiency anemia and Melena    Procedure Timing: Within 4 weeks (Urgent)    #If within 4 weeks selected, please wilbert as high priority#    #If greater than 12 weeks, please select "5-12 weeks" and delay sending until 3 months prior to requested date#     Location: Hospital Based ([facility]End[facility],  endo, [facility])    Additional Scheduling Information: Advanced cardiac or pulmonary disease    Prep Specifications:Standard prep    Is the patient taking a GLP-1 Agonist:no    Have you attached a patient to this message: yes  "

## 2024-10-07 ENCOUNTER — E-CONSULT (OUTPATIENT)
Dept: CARDIOLOGY | Facility: CLINIC | Age: 79
End: 2024-10-07
Payer: MEDICARE

## 2024-10-07 ENCOUNTER — TELEPHONE (OUTPATIENT)
Dept: ENDOSCOPY | Facility: HOSPITAL | Age: 79
End: 2024-10-07
Payer: MEDICARE

## 2024-10-07 DIAGNOSIS — Z01.810 PREOP CARDIOVASCULAR EXAM: Primary | ICD-10-CM

## 2024-10-07 NOTE — CONSULTS
[facility] - Cardiology  Response for E-Consult     Patient Name: Kristin Goodman  MRN: [id number]  Primary Care Provider: Lori Hernandez MD   Requesting Provider: Yasmin Avitia NP  E-Consult to Cardiology  Consult performed by: Cesar Mills MD  Consult ordered by: Yasmin Avitia NP          Recommendation:  Patient may hold Eliquis as needed for endoscopy    Contingency that warrants a repeat eConsult or referral: n/a    Total time of Consultation: 5 minute    I did not speak to the requesting provider verbally about this.     *This eConsult is based on the clinical data available to me and is furnished without benefit of a physical examination. The eConsult will need to be interpreted in light of any clinical issues or changes in patient status not available to me at the time of filing this eConsults. Significant changes in patient condition or level of acuity should result in immediate formal consultation and reevaluation. Please alert me if you have further questions.    Thank you for this eConsult referral.     Cesar Mills MD  [facility] - Cardiology

## 2024-10-07 NOTE — TELEPHONE ENCOUNTER
----- Message from Med Assistant Cordova sent at 10/4/2024 10:55 AM CDT -----  Regarding: 10/17 BT  The patient is currently under an internal cardiologist Dr.Bedi paul and requires a blood thinner Eliquis (apixaban) for their upcoming scheduled Colonoscopy/EGD on 10/17/2024.       Notes:

## 2024-10-08 NOTE — TELEPHONE ENCOUNTER
Patient has been approved to hold Eliquis (apixaban) for 2 days per Dr. Mills-see E consult dated 10/7/24.

## 2024-10-11 ENCOUNTER — TELEPHONE (OUTPATIENT)
Dept: ENDOSCOPY | Facility: HOSPITAL | Age: 79
End: 2024-10-11
Payer: MEDICARE

## 2024-10-11 NOTE — ASSESSMENT & PLAN NOTE
Has hyponatremia,started on NS. was given 2 grams of sodium chloride tablets TID for likely SIADH  11/18  sodium normalized at 137. salt tablets discontinued.     no

## 2024-10-11 NOTE — TELEPHONE ENCOUNTER
Spoke to pt's daughter, Roro, for pre-call to confirm scheduled Colonoscopy/EGD and patient verbalized understanding of the following:       Date of Procedure (s)  verified 10/17/24  Arrival Time 11:00 AM verified for labwork, then 12:15PM for procedure  Location of Procedure(s) [address]  verified.  NPO status reinforced. Ok to continue clear liquids up until 4 hours prior to the Endoscopy procedure.   Denies GLP-1s.    Pt is taking  Eliquis (apixaban) , Pt instructed to stop taking Eliquis (apixaban)  on:  10/15/24, per endoscopy protocol.  Approval to hold received from Dr. Mills.   Pt confirmed receipt of prep instructions and Rx prep (if applicable).  Instructions provided to patient via [facility]  Pt confirmed ride home after procedure if procedure requires anesthesia.   Pre-call screening questionnaire reviewed and completed with patient.   Appointment details are tentative, including check-in time.  If the patient begins taking any blood thinning medications, injectable weight loss/diabetes medications (other than insulin), or Adipex (phentermine) patient was instructed to contact the endoscopy scheduling department as soon as possible.  Patient was advised to call the endoscopy scheduling department if any questions or concerns arise.        Endoscopy Scheduling Department

## 2024-10-16 ENCOUNTER — TELEPHONE (OUTPATIENT)
Dept: ENDOSCOPY | Facility: HOSPITAL | Age: 79
End: 2024-10-16
Payer: MEDICARE

## 2024-10-16 ENCOUNTER — ANESTHESIA EVENT (OUTPATIENT)
Dept: ENDOSCOPY | Facility: HOSPITAL | Age: 79
End: 2024-10-16
Payer: MEDICARE

## 2024-10-16 NOTE — TELEPHONE ENCOUNTER
Spoke to patient and patient's daughter. Instructed earlier arrival time for procedure at 11:30 AM. They verbalized an understanding.

## 2024-10-17 ENCOUNTER — HOSPITAL ENCOUNTER (OUTPATIENT)
Facility: HOSPITAL | Age: 79
Discharge: HOME | End: 2024-10-17
Attending: STUDENT IN AN ORGANIZED HEALTH CARE EDUCATION/TRAINING PROGRAM | Admitting: STUDENT IN AN ORGANIZED HEALTH CARE EDUCATION/TRAINING PROGRAM
Payer: MEDICARE

## 2024-10-17 ENCOUNTER — ANESTHESIA (OUTPATIENT)
Dept: ENDOSCOPY | Facility: HOSPITAL | Age: 79
End: 2024-10-17
Payer: MEDICARE

## 2024-10-17 VITALS
DIASTOLIC BLOOD PRESSURE: 64 MMHG | OXYGEN SATURATION: 97 % | HEART RATE: 77 BPM | SYSTOLIC BLOOD PRESSURE: 137 MMHG | TEMPERATURE: 98 F | RESPIRATION RATE: 16 BRPM

## 2024-10-17 DIAGNOSIS — N18.6 ESRD (END STAGE RENAL DISEASE): ICD-10-CM

## 2024-10-17 DIAGNOSIS — D50.9 IRON DEFICIENCY ANEMIA: ICD-10-CM

## 2024-10-17 DIAGNOSIS — D63.1 ANEMIA DUE TO CHRONIC KIDNEY DISEASE, UNSPECIFIED CKD STAGE: Primary | ICD-10-CM

## 2024-10-17 DIAGNOSIS — N18.9 ANEMIA DUE TO CHRONIC KIDNEY DISEASE, UNSPECIFIED CKD STAGE: Primary | ICD-10-CM

## 2024-10-17 PROCEDURE — 63600175 PHARM REV CODE 636 W HCPCS

## 2024-10-17 PROCEDURE — 45385 COLONOSCOPY W/LESION REMOVAL: CPT | Performed by: STUDENT IN AN ORGANIZED HEALTH CARE EDUCATION/TRAINING PROGRAM

## 2024-10-17 PROCEDURE — 25000003 PHARM REV CODE 250

## 2024-10-17 PROCEDURE — 27201089 HC SNARE, DISP (ANY): Performed by: STUDENT IN AN ORGANIZED HEALTH CARE EDUCATION/TRAINING PROGRAM

## 2024-10-17 PROCEDURE — 45385 COLONOSCOPY W/LESION REMOVAL: CPT | Mod: ,,, | Performed by: STUDENT IN AN ORGANIZED HEALTH CARE EDUCATION/TRAINING PROGRAM

## 2024-10-17 PROCEDURE — 37000009 HC ANESTHESIA EA ADD 15 MINS: Performed by: STUDENT IN AN ORGANIZED HEALTH CARE EDUCATION/TRAINING PROGRAM

## 2024-10-17 PROCEDURE — 45380 COLONOSCOPY AND BIOPSY: CPT | Mod: 59,,, | Performed by: STUDENT IN AN ORGANIZED HEALTH CARE EDUCATION/TRAINING PROGRAM

## 2024-10-17 PROCEDURE — 45380 COLONOSCOPY AND BIOPSY: CPT | Mod: 59 | Performed by: STUDENT IN AN ORGANIZED HEALTH CARE EDUCATION/TRAINING PROGRAM

## 2024-10-17 PROCEDURE — 27201012 HC FORCEPS, HOT/COLD, DISP: Performed by: STUDENT IN AN ORGANIZED HEALTH CARE EDUCATION/TRAINING PROGRAM

## 2024-10-17 PROCEDURE — 88305 TISSUE EXAM BY PATHOLOGIST: CPT | Mod: 26,,, | Performed by: PATHOLOGY

## 2024-10-17 PROCEDURE — 43239 EGD BIOPSY SINGLE/MULTIPLE: CPT | Performed by: STUDENT IN AN ORGANIZED HEALTH CARE EDUCATION/TRAINING PROGRAM

## 2024-10-17 PROCEDURE — 88305 TISSUE EXAM BY PATHOLOGIST: CPT | Mod: 59 | Performed by: PATHOLOGY

## 2024-10-17 PROCEDURE — 43239 EGD BIOPSY SINGLE/MULTIPLE: CPT | Mod: 51,,, | Performed by: STUDENT IN AN ORGANIZED HEALTH CARE EDUCATION/TRAINING PROGRAM

## 2024-10-17 PROCEDURE — 37000008 HC ANESTHESIA 1ST 15 MINUTES: Performed by: STUDENT IN AN ORGANIZED HEALTH CARE EDUCATION/TRAINING PROGRAM

## 2024-10-17 RX ORDER — GLYCOPYRROLATE 0.2 MG/ML
INJECTION INTRAMUSCULAR; INTRAVENOUS
Status: DISCONTINUED
Start: 2024-10-17 | End: 2024-10-17 | Stop reason: HOSPADM

## 2024-10-17 RX ORDER — LIDOCAINE HYDROCHLORIDE 20 MG/ML
INJECTION, SOLUTION EPIDURAL; INFILTRATION; INTRACAUDAL; PERINEURAL
Status: DISCONTINUED
Start: 2024-10-17 | End: 2024-10-17 | Stop reason: HOSPADM

## 2024-10-17 RX ORDER — PROPOFOL 10 MG/ML
VIAL (ML) INTRAVENOUS
Status: DISCONTINUED
Start: 2024-10-17 | End: 2024-10-17 | Stop reason: HOSPADM

## 2024-10-17 RX ORDER — PHENYLEPHRINE HCL IN 0.9% NACL 1 MG/10 ML
SYRINGE (ML) INTRAVENOUS
Status: DISCONTINUED
Start: 2024-10-17 | End: 2024-10-17 | Stop reason: HOSPADM

## 2024-10-17 RX ORDER — DEXMEDETOMIDINE HYDROCHLORIDE 100 UG/ML
INJECTION, SOLUTION INTRAVENOUS
Status: DISCONTINUED | OUTPATIENT
Start: 2024-10-17 | End: 2024-10-17

## 2024-10-17 RX ORDER — LIDOCAINE HYDROCHLORIDE 20 MG/ML
INJECTION INTRAVENOUS
Status: DISCONTINUED | OUTPATIENT
Start: 2024-10-17 | End: 2024-10-17

## 2024-10-17 RX ORDER — PANTOPRAZOLE SODIUM 40 MG/1
40 TABLET, DELAYED RELEASE ORAL
Qty: 120 TABLET | Refills: 0 | Status: SHIPPED | OUTPATIENT
Start: 2024-10-17 | End: 2024-12-16

## 2024-10-17 RX ORDER — PHENYLEPHRINE HYDROCHLORIDE 10 MG/ML
INJECTION INTRAVENOUS
Status: DISCONTINUED | OUTPATIENT
Start: 2024-10-17 | End: 2024-10-17

## 2024-10-17 RX ORDER — PROPOFOL 10 MG/ML
VIAL (ML) INTRAVENOUS
Status: DISCONTINUED | OUTPATIENT
Start: 2024-10-17 | End: 2024-10-17

## 2024-10-17 RX ADMIN — PROPOFOL 10 MG: 10 INJECTION, EMULSION INTRAVENOUS at 01:10

## 2024-10-17 RX ADMIN — DEXMEDETOMIDINE HYDROCHLORIDE 8 MCG: 100 INJECTION, SOLUTION INTRAVENOUS at 12:10

## 2024-10-17 RX ADMIN — PHENYLEPHRINE HYDROCHLORIDE 100 MCG: 10 INJECTION INTRAVENOUS at 01:10

## 2024-10-17 RX ADMIN — PROPOFOL 40 MG: 10 INJECTION, EMULSION INTRAVENOUS at 12:10

## 2024-10-17 RX ADMIN — LIDOCAINE HYDROCHLORIDE 100 MG: 20 INJECTION, SOLUTION INTRAVENOUS at 12:10

## 2024-10-17 RX ADMIN — PROPOFOL 20 MG: 10 INJECTION, EMULSION INTRAVENOUS at 01:10

## 2024-10-17 RX ADMIN — PHENYLEPHRINE HYDROCHLORIDE 100 MCG: 10 INJECTION INTRAVENOUS at 12:10

## 2024-10-17 RX ADMIN — PROPOFOL 20 MG: 10 INJECTION, EMULSION INTRAVENOUS at 12:10

## 2024-10-17 RX ADMIN — SODIUM CHLORIDE: 0.9 INJECTION, SOLUTION INTRAVENOUS at 12:10

## 2024-10-17 RX ADMIN — PROPOFOL 80 MG: 10 INJECTION, EMULSION INTRAVENOUS at 12:10

## 2024-10-17 RX ADMIN — PROPOFOL 30 MG: 10 INJECTION, EMULSION INTRAVENOUS at 01:10

## 2024-10-17 RX ADMIN — GLYCOPYRROLATE 0.1 MG: 0.2 INJECTION, SOLUTION INTRAMUSCULAR; INTRAVITREAL at 12:10

## 2024-10-17 NOTE — PROVATION PATIENT INSTRUCTIONS
Discharge Summary/Instructions after an Endoscopic Procedure  Patient Name: Kristin Goodman  Patient MRN: [id number]  Patient YOB: 1945 Thursday, October 17, 2024  Rosanna Mitchell MD  Dear patient,  As a result of recent federal legislation (The Federal Cures Act), you may   receive lab or pathology results from your procedure in your [facility]   account before your physician is able to contact you. Your physician or   their representative will relay the results to you with their   recommendations at their soonest availability.  Thank you,  RESTRICTIONS:  During your procedure today, you received medications for sedation.  These   medications may affect your judgment, balance and coordination.  Therefore,   for 24 hours, you have the following restrictions:   - DO NOT drive a car, operate machinery, make legal/financial decisions,   sign important papers or drink alcohol.    ACTIVITY:  Today: no heavy lifting, straining or running due to procedural   sedation/anesthesia.  The following day: return to full activity including work.  DIET:  Eat and drink normally unless instructed otherwise.     TREATMENT FOR COMMON SIDE EFFECTS:  - Mild abdominal pain, nausea, belching, bloating or excessive gas:  rest,   eat lightly and use a heating pad.  - Sore Throat: treat with throat lozenges and/or gargle with warm salt   water.  - Because air was used during the procedure, expelling large amounts of air   from your rectum or belching is normal.  - If a bowel prep was taken, you may not have a bowel movement for 1-3 days.    This is normal.  SYMPTOMS TO WATCH FOR AND REPORT TO YOUR PHYSICIAN:  1. Abdominal pain or bloating, other than gas cramps.  2. Chest pain.  3. Back pain.  4. Signs of infection such as: chills or fever occurring within 24 hours   after the procedure.  5. Rectal bleeding, which would show as bright red, maroon, or black stools.   (A tablespoon of blood from the rectum is not serious, especially  if   hemorrhoids are present.)  6. Vomiting.  7. Weakness or dizziness.  GO DIRECTLY TO THE NEAREST EMERGENCY ROOM IF YOU HAVE ANY OF THE FOLLOWING:      Difficulty breathing              Chills and/or fever over 101 F   Persistent vomiting and/or vomiting blood   Severe abdominal pain   Severe chest pain   Black, tarry stools   Bleeding- more than one tablespoon   Any other symptom or condition that you feel may need urgent attention  Your doctor recommends these additional instructions:  If any biopsies were taken, your doctors clinic will contact you in 1 to 2   weeks with any results.  - Patient has a contact number available for emergencies.  The signs and   symptoms of potential delayed complications were discussed with the   patient.  Return to normal activities tomorrow.  Written discharge   instructions were provided to the patient.   - Discharge patient to home (with escort).   - Resume previous diet.   - Continue present medications.   - Await pathology results.   - Repeat colonoscopy in 1 year for surveillance based on clinical status at   that time.   - Return to GI clinic as previously scheduled.   - Anemia may be from duodenal ulcers. Would recommend PPI and repeat counts   in 2-3 months as well as iron supplementation. Would also recommend   erythropoietin. If anemia persists, can consider video capsule endoscopy.  For questions, problems or results please call your physician - Rosanna Mitchell MD at Work:  [phone].  [facility] can be reached at [phone]     IF A COMPLICATION OR EMERGENCY SITUATION ARISES AND YOU ARE UNABLE TO REACH   YOUR PHYSICIAN - GO DIRECTLY TO THE EMERGENCY ROOM.  MD Rosanna Donahue MD  10/17/2024 2:20:30 PM  This report has been verified and signed electronically.  Dear patient,  As a result of recent federal legislation (The Federal Cures Act), you may   receive lab or pathology results from your procedure  in your [facility]   account before your physician is able to contact you. Your physician or   their representative will relay the results to you with their   recommendations at their soonest availability.  Thank you,  PROVATION

## 2024-10-17 NOTE — ANESTHESIA PREPROCEDURE EVALUATION
10/17/2024    Pre-operative evaluation for Procedure(s) (LRB):  EGD (ESOPHAGOGASTRODUODENOSCOPY) (N/A)  COLONOSCOPY (N/A)    Kristin Goodman is a 79 y.o. female     Patient Active Problem List   Diagnosis    Hypothyroid    Primary hypertension    Mitral valve regurgitation    Chest pain    Syncope    CAREY (dyspnea on exertion)    Chronic diastolic heart failure    DONAVAN (obstructive sleep apnea)    Sleep arousal disorder    Acute medial meniscal tear, right, initial encounter    Impaired mobility    S/P meniscectomy    Other nonthrombocytopenic purpura    Atherosclerosis of renal artery    Alteration in instrumental activities of daily living (IADL)    Other specified anemias    Antineutrophilic cytoplasmic antibody (ANCA) vasculitis    Moderate malnutrition    Dysphagia    Immunosuppression due to drug therapy    Gastric reflux    Hematuria syndrome    Dependence on hemodialysis    Anemia due to chronic kidney disease    Dizzy spells    Acute deep vein thrombosis (DVT) of non-extremity vein - subclavian    Secondary hyperparathyroidism of renal origin    Lightheadedness    Current long-term use of anticoagulant medication with history of deep venous thrombosis (DVT)    ESRD (end stage renal disease)    Other stimulant dependence, uncomplicated    UTI symptoms       Review of patient's allergies indicates:   Allergen Reactions    Ampicillin     Lactose Diarrhea    Peaches [peach (prunus persica)] Other (See Comments)     Pt unable to state type of reaction. Information obtained from daughter who states she was informed of allergy from patient.    Penicillins      Other reaction(s): Hives, anaphylaxis    Sulfa (sulfonamide antibiotics) Rash and Hives       Current Facility-Administered Medications on File Prior to Encounter   Medication Dose Route Frequency Provider Last Rate Last Admin    vancomycin - pharmacy to dose   Intravenous pharmacy to manage frequency John Hudson MD         Current Outpatient  Medications on File Prior to Encounter   Medication Sig Dispense Refill    albuterol (VENTOLIN HFA) 90 mcg/actuation inhaler Inhale 2 puffs into the lungs every 6 (six) hours as needed for Shortness of Breath. Rescue 18 g 3    amLODIPine (NORVASC) 10 MG tablet Take 1 tablet (10 mg total) by mouth once daily. 90 tablet 3    apixaban (ELIQUIS) 5 mg Tab Take 1 tablet (5 mg total) by mouth 2 (two) times daily. 60 tablet 1    atovaquone (MEPRON) 750 mg/5 mL Susp oral liquid Take 10 mLs (1,500 mg total) by mouth once daily. 300 mL 3    carvediloL (COREG) 12.5 MG tablet Take 1 tablet (12.5 mg total) by mouth 2 (two) times daily. 60 tablet 11    cyclobenzaprine (FLEXERIL) 5 MG tablet Take 1 tablet (5 mg total) by mouth nightly as needed for Muscle spasms. 20 tablet 2    fluticasone propionate (FLONASE) 50 mcg/actuation nasal spray 1 spray (50 mcg total) by Each Nostril route 2 (two) times daily. 16 g 3    levocetirizine (XYZAL) 5 MG tablet Take 0.5 tablets (2.5 mg total) by mouth every evening. For sinus 15 tablet 11    levothyroxine (EUTHYROX) 25 MCG tablet Take 1 tablet (25 mcg total) by mouth once daily. 90 tablet 3    methoxy peg-epoetin beta (MIRCERA INJ) 50 mcg.      predniSONE (DELTASONE) 2.5 MG tablet Take 2 tablets (5 mg total) by mouth once daily. 60 tablet 3    predniSONE (DELTASONE) 5 MG tablet Take 1 tablet by mouth. (Patient not taking: Reported on 10/4/2024)      QUEtiapine (SEROQUEL) 25 MG Tab Take 1 tablet (25 mg total) by mouth every evening. 30 tablet 11    RENVELA 800 mg Tab Take 1 tablet (800 mg total) by mouth 3 (three) times daily. 30 tablet 11    tobramycin sulfate 0.3% (TOBREX) 0.3 % ophthalmic solution Apply 1-2 drops to wound beds twice daily 5 mL 2    torsemide (DEMADEX) 100 MG Tab Take 100 mg by mouth once daily.         Past Surgical History:   Procedure Laterality Date    ARTHROSCOPIC CHONDROPLASTY OF KNEE JOINT Right 12/21/2021    Procedure: ARTHROSCOPY, KNEE, WITH CHONDROPLASTY;  Surgeon:  Elly Sullivan MD;  Location: [facility] OR;  Service: Orthopedics;  Laterality: Right;    COLONOSCOPY N/A 9/28/2020    Procedure: COLONOSCOPY;  Surgeon: Jaylan Flynn MD;  Location: [facility] ENDO;  Service: Endoscopy;  Laterality: N/A;    ESOPHAGOGASTRODUODENOSCOPY N/A 11/14/2022    Procedure: EGD (ESOPHAGOGASTRODUODENOSCOPY);  Surgeon: Asaf Hahn MD;  Location: [facility] ENDO (2ND FLR);  Service: Endoscopy;  Laterality: N/A;    KNEE ARTHROSCOPY W/ MENISCECTOMY Right 12/21/2021    Procedure: ARTHROSCOPY, KNEE, WITH MENISCECTOMY;  Surgeon: Elly Sullivan MD;  Location: [facility] OR;  Service: Orthopedics;  Laterality: Right;  general, regional w catheter, adductor, josefina 50cc,     OOPHORECTOMY      PLACEMENT OF ARTERIOVENOUS GRAFT Left 9/7/2023    Procedure: INSERTION, GRAFT, ARTERIOVENOUS;  Surgeon: John Hudson MD;  Location: [facility] OR;  Service: Vascular;  Laterality: Left;  RN PREOP 8/31/2023 TYPE&SCREEN---done 9/6/2023 9:00 AM-----HAS CARDS CLEARANCE    SYNOVECTOMY OF KNEE Right 12/21/2021    Procedure: SYNOVECTOMY, KNEE;  Surgeon: Elly Sullivan MD;  Location: [facility];  Service: Orthopedics;  Laterality: Right;    TOTAL ABDOMINAL HYSTERECTOMY      19 yrs ago             Pre-op Assessment    I have reviewed the Patient Summary Reports.    I have reviewed the NPO Status.      Review of Systems  Anesthesia Hx:  No problems with previous Anesthesia             Denies Family Hx of Anesthesia complications.     Social:  Former Smoker       Hematology/Oncology:  Hematology Normal   Oncology Normal                                   EENT/Dental:  EENT/Dental Normal           Cardiovascular:     Hypertension       CHF     CAREY           Shortness of Breath Dyspnea On Extertion                   Hypertension         Pulmonary:  Pneumonia    Shortness of breath  Sleep Apnea     Obstructive Sleep Apnea (DONAVAN).       Pulmonary Infection:  Pneumonia.     Renal/:  Chronic Renal Disease, ESRD   Dialysis Tues / Sat      Kidney Function/Disease             Hepatic/GI:  Hepatic/GI Normal                    Musculoskeletal:  Arthritis        Arthritis          Neurological:  Neurology Normal              Arthritis                           Endocrine:   Hypothyroidism       Hypothyroidism          Dermatological:  Skin Normal    Psych:  Psychiatric Normal                    Physical Exam  General: Well nourished, Cooperative, Alert and Oriented    Airway:  Mallampati: II   Mouth Opening: Normal  TM Distance: Normal  Tongue: Normal  Neck ROM: Normal ROM    Dental:  Partial Dentures    Chest/Lungs:  Normal Respiratory Rate    Heart:  Rate: Normal        Anesthesia Plan  Type of Anesthesia, risks & benefits discussed:    Anesthesia Type: Gen Natural Airway  Intra-op Monitoring Plan: Standard ASA Monitors  Induction:  IV  Informed Consent: Informed consent signed with the Patient and all parties understand the risks and agree with anesthesia plan.  All questions answered.   ASA Score: 3    Ready For Surgery From Anesthesia Perspective.     .

## 2024-10-17 NOTE — TRANSFER OF CARE
Anesthesia Transfer of Care Note    Patient: Kristin Goodman    Procedure(s) Performed: Procedure(s) (LRB):  EGD (ESOPHAGOGASTRODUODENOSCOPY) (N/A)  COLONOSCOPY (N/A)    Patient location: GI    Anesthesia Type: general    Transport from OR: Transported from OR on room air with adequate spontaneous ventilation    Post pain: adequate analgesia    Post assessment: no apparent anesthetic complications and tolerated procedure well    Post vital signs: stable    Level of consciousness: awake    Nausea/Vomiting: no nausea/vomiting    Complications: none    Transfer of care protocol was followed      Last vitals: Visit Vitals  BP (!) 96/52 (BP Location: Right arm, Patient Position: Lying)   Pulse 82   Temp 36.6 °C (97.9 °F) (Oral)   Resp 12   SpO2 97%   Breastfeeding No

## 2024-10-17 NOTE — PLAN OF CARE
Procedure and recovery completed without complication. Pt AAOx4, in NAD, VSS, denies pain. Pt discussed case with MD. Discharge instructions reviewed, pt verbalizes understanding. Pt off unit via w/c accompanied by endo tech and family member.

## 2024-10-17 NOTE — H&P
Short Stay Endoscopy History and Physical    PCP - Lori Hernandez MD    Procedure - EGD, Colonoscopy  ASA - per anesthesia  Mallampati - per anesthesia  History of Anesthesia problems - no  Family history Anesthesia problems -  no   Plan of anesthesia - General    HPI:  This is a 79 y.o. female here for evaluation of : Iron deficiency anemia & hx of colon polyps    ROS:  Constitutional: No fevers, chills  CV: No chest pain  Pulm: No shortness of breath  GI: see HPI  Derm: No rash    Medical History:  has a past medical history of Acute blood loss anemia (10/17/2022), Acute hypoxemic respiratory failure (10/23/2022), Allergy, Anticoagulant long-term use, Back pain, Chronic diastolic heart failure (08/31/2020), Chronic diastolic heart failure (08/31/2020), Colon polyp, Disorder of kidney and ureter, Encounter for blood transfusion, H/O Bell's palsy (2006), Helicobacter pylori (H. pylori), HTN (hypertension), Hypothyroid, OA (osteoarthritis), DONAVAN (obstructive sleep apnea) (11/09/2020), Pneumonia due to other staphylococcus, Pulmonary HTN (08/31/2020), Sepsis due to pneumonia (10/17/2022), Septic shock (10/27/2022), Trouble in sleeping, and Urinary incontinence.    Surgical History:  has a past surgical history that includes Total abdominal hysterectomy; Oophorectomy; Colonoscopy (N/A, 9/28/2020); Knee arthroscopy w/ meniscectomy (Right, 12/21/2021); Arthroscopic chondroplasty of knee joint (Right, 12/21/2021); Synovectomy of knee (Right, 12/21/2021); Esophagogastroduodenoscopy (N/A, 11/14/2022); and Placement of arteriovenous graft (Left, 9/7/2023).    Family History: family history includes Alzheimer's disease in her sister; Arthritis in her brother, mother, and sister; Breast cancer in an other family member; Cancer in her brother and sister; Diabetes in her father; Early death (age of onset: 56) in her mother; Early death (age of onset: 59) in her brother; Early death (age of onset: 62) in her father; Early  death (age of onset: 63) in her sister; Heart disease in her brother and sister; Hyperlipidemia in her sister; Hypertension in her brother, daughter, father, mother, and sister; Prostate cancer in her brother; Rheum arthritis in her sister; Stroke in her father; Vision loss in her brother.. Otherwise no colon cancer, inflammatory bowel disease, or GI malignancies.    Social History:  reports that she has quit smoking. Her smoking use included cigarettes. She has a 3 pack-year smoking history. She has never used smokeless tobacco. She reports that she does not drink alcohol and does not use drugs.    Review of patient's allergies indicates:   Allergen Reactions    Ampicillin     Lactose Diarrhea    Peaches [peach (prunus persica)] Other (See Comments)     Pt unable to state type of reaction. Information obtained from daughter who states she was informed of allergy from patient.    Penicillins      Other reaction(s): Hives, anaphylaxis    Sulfa (sulfonamide antibiotics) Rash and Hives       Medications:   Medications Prior to Admission   Medication Sig Dispense Refill Last Dose/Taking    amLODIPine (NORVASC) 10 MG tablet Take 1 tablet (10 mg total) by mouth once daily. 90 tablet 3 10/17/2024    levocetirizine (XYZAL) 5 MG tablet Take 0.5 tablets (2.5 mg total) by mouth every evening. For sinus 15 tablet 11 10/17/2024    levothyroxine (EUTHYROX) 25 MCG tablet Take 1 tablet (25 mcg total) by mouth once daily. 90 tablet 3 10/17/2024    predniSONE (DELTASONE) 2.5 MG tablet Take 2 tablets (5 mg total) by mouth once daily. 60 tablet 3 10/17/2024    QUEtiapine (SEROQUEL) 25 MG Tab Take 1 tablet (25 mg total) by mouth every evening. 30 tablet 11 10/16/2024    albuterol (VENTOLIN HFA) 90 mcg/actuation inhaler Inhale 2 puffs into the lungs every 6 (six) hours as needed for Shortness of Breath. Rescue 18 g 3     apixaban (ELIQUIS) 5 mg Tab Take 1 tablet (5 mg total) by mouth 2 (two) times daily. 60 tablet 1 10/14/2024     atovaquone (MEPRON) 750 mg/5 mL Susp oral liquid Take 10 mLs (1,500 mg total) by mouth once daily. 300 mL 3     carvediloL (COREG) 12.5 MG tablet Take 1 tablet (12.5 mg total) by mouth 2 (two) times daily. 60 tablet 11     cyclobenzaprine (FLEXERIL) 5 MG tablet Take 1 tablet (5 mg total) by mouth nightly as needed for Muscle spasms. 20 tablet 2     fluticasone propionate (FLONASE) 50 mcg/actuation nasal spray 1 spray (50 mcg total) by Each Nostril route 2 (two) times daily. 16 g 3     methoxy peg-epoetin beta (MIRCERA INJ) 50 mcg.       predniSONE (DELTASONE) 5 MG tablet Take 1 tablet by mouth. (Patient not taking: Reported on 10/4/2024)       RENVELA 800 mg Tab Take 1 tablet (800 mg total) by mouth 3 (three) times daily. 30 tablet 11     tobramycin sulfate 0.3% (TOBREX) 0.3 % ophthalmic solution Apply 1-2 drops to wound beds twice daily 5 mL 2     torsemide (DEMADEX) 100 MG Tab Take 100 mg by mouth once daily.            Physical Exam:    Vital Signs:   Vitals:    10/17/24 1138   BP: (!) 175/77   Pulse: 60   Resp: 20   Temp: 98.5 °F (36.9 °C)       General Appearance: Well appearing in no acute distress  Eyes:    No scleral icterus  ENT: lips and tongue normal  Lungs: no use of accessory muscles  Heart:  normal rate, regular rhythm  Abdomen: Soft, non-tender, non distended   Extremities: no edema  Skin: No rash      Labs:  Lab Results   Component Value Date    WBC 11.02 10/04/2024    HGB 11.6 (L) 10/04/2024    HCT 37.4 10/04/2024     10/04/2024    CHOL 168 08/29/2024    TRIG 181 (H) 08/29/2024    HDL 44 08/29/2024    ALT 13 08/29/2024    AST 20 08/29/2024     08/29/2024    K 3.6 10/17/2024    CL 99 08/29/2024    CREATININE 4.4 (H) 08/29/2024    BUN 62 (H) 08/29/2024    CO2 27 08/29/2024    TSH 2.137 08/29/2024    INR 1.0 09/06/2023    HGBA1C 4.7 08/29/2024       I have explained the risks and benefits of endoscopy procedures to the patient including but not limited to bleeding, perforation, infection,  and death.  The patient was asked if they understand and allowed to ask any further questions to their satisfaction.    Yimi Epps MD

## 2024-10-17 NOTE — PROVATION PATIENT INSTRUCTIONS
Discharge Summary/Instructions after an Endoscopic Procedure  Patient Name: Kristin Goodman  Patient MRN: [id number]  Patient YOB: 1945 Thursday, October 17, 2024  Rosanna Mitchell MD  Dear patient,  As a result of recent federal legislation (The Federal Cures Act), you may   receive lab or pathology results from your procedure in your [facility]   account before your physician is able to contact you. Your physician or   their representative will relay the results to you with their   recommendations at their soonest availability.  Thank you,  RESTRICTIONS:  During your procedure today, you received medications for sedation.  These   medications may affect your judgment, balance and coordination.  Therefore,   for 24 hours, you have the following restrictions:   - DO NOT drive a car, operate machinery, make legal/financial decisions,   sign important papers or drink alcohol.    ACTIVITY:  Today: no heavy lifting, straining or running due to procedural   sedation/anesthesia.  The following day: return to full activity including work.  DIET:  Eat and drink normally unless instructed otherwise.     TREATMENT FOR COMMON SIDE EFFECTS:  - Mild abdominal pain, nausea, belching, bloating or excessive gas:  rest,   eat lightly and use a heating pad.  - Sore Throat: treat with throat lozenges and/or gargle with warm salt   water.  - Because air was used during the procedure, expelling large amounts of air   from your rectum or belching is normal.  - If a bowel prep was taken, you may not have a bowel movement for 1-3 days.    This is normal.  SYMPTOMS TO WATCH FOR AND REPORT TO YOUR PHYSICIAN:  1. Abdominal pain or bloating, other than gas cramps.  2. Chest pain.  3. Back pain.  4. Signs of infection such as: chills or fever occurring within 24 hours   after the procedure.  5. Rectal bleeding, which would show as bright red, maroon, or black stools.   (A tablespoon of blood from the rectum is not serious, especially  if   hemorrhoids are present.)  6. Vomiting.  7. Weakness or dizziness.  GO DIRECTLY TO THE NEAREST EMERGENCY ROOM IF YOU HAVE ANY OF THE FOLLOWING:      Difficulty breathing              Chills and/or fever over 101 F   Persistent vomiting and/or vomiting blood   Severe abdominal pain   Severe chest pain   Black, tarry stools   Bleeding- more than one tablespoon   Any other symptom or condition that you feel may need urgent attention  Your doctor recommends these additional instructions:  If any biopsies were taken, your doctors clinic will contact you in 1 to 2   weeks with any results.  - Perform a colonoscopy today.   - Use Protonix (pantoprazole) 40 mg PO BID for 8 weeks.   - Await pathology results.   - Patient has a contact number available for emergencies.  The signs and   symptoms of potential delayed complications were discussed with the   patient.  Return to normal activities tomorrow.  Written discharge   instructions were provided to the patient.   - Discharge patient to home.   - Resume previous diet.  For questions, problems or results please call your physician - Rosanna Mitchell MD at Work:  [phone].  [facility] can be reached at [phone]     IF A COMPLICATION OR EMERGENCY SITUATION ARISES AND YOU ARE UNABLE TO REACH   YOUR PHYSICIAN - GO DIRECTLY TO THE EMERGENCY ROOM.  MD Rosanna Donahue MD  10/17/2024 2:16:36 PM  This report has been verified and signed electronically.  Dear patient,  As a result of recent federal legislation (The Federal Cures Act), you may   receive lab or pathology results from your procedure in your [facility]   account before your physician is able to contact you. Your physician or   their representative will relay the results to you with their   recommendations at their soonest availability.  Thank you,  PROVATION

## 2024-10-18 LAB
FINAL PATHOLOGIC DIAGNOSIS: NORMAL
GROSS: NORMAL
Lab: NORMAL

## 2024-10-18 NOTE — ANESTHESIA POSTPROCEDURE EVALUATION
Anesthesia Post Evaluation    Patient: Kristin Goodman    Procedure(s) Performed: Procedure(s) (LRB):  EGD (ESOPHAGOGASTRODUODENOSCOPY) (N/A)  COLONOSCOPY (N/A)    Final Anesthesia Type: general      Patient location during evaluation: GI PACU  Patient participation: Yes- Able to Participate  Level of consciousness: awake and alert and oriented  Post-procedure vital signs: reviewed and stable  Pain management: adequate  Airway patency: patent    PONV status at discharge: No PONV  Anesthetic complications: no      Cardiovascular status: blood pressure returned to baseline and hemodynamically stable  Respiratory status: unassisted, spontaneous ventilation and room air  Hydration status: euvolemic  Follow-up not needed.              Vitals Value Taken Time   /64 10/17/24 1416   Temp 36.6 °C (97.9 °F) 10/17/24 1345   Pulse 77 10/17/24 1415   Resp 16 10/17/24 1415   SpO2 97 % 10/17/24 1415         Event Time   Out of Recovery 14:15:00         Pain/Kathryn Score: Kathryn Score: 9 (10/17/2024  2:15 PM)

## 2024-10-24 ENCOUNTER — PROCEDURE VISIT (OUTPATIENT)
Dept: PODIATRY | Facility: CLINIC | Age: 79
End: 2024-10-24
Payer: MEDICARE

## 2024-10-24 VITALS — OXYGEN SATURATION: 100 % | SYSTOLIC BLOOD PRESSURE: 161 MMHG | DIASTOLIC BLOOD PRESSURE: 70 MMHG | HEART RATE: 70 BPM

## 2024-10-24 DIAGNOSIS — L60.0 INGROWN NAIL: Primary | ICD-10-CM

## 2024-10-24 DIAGNOSIS — M79.675 GREAT TOE PAIN, LEFT: ICD-10-CM

## 2024-10-24 RX ORDER — BUPIVACAINE HYDROCHLORIDE 5 MG/ML
1 INJECTION, SOLUTION EPIDURAL; INTRACAUDAL ONCE
Status: COMPLETED | OUTPATIENT
Start: 2024-10-24 | End: 2024-10-24

## 2024-10-24 RX ORDER — LIDOCAINE HYDROCHLORIDE 10 MG/ML
1 INJECTION, SOLUTION INFILTRATION; PERINEURAL
Status: COMPLETED | OUTPATIENT
Start: 2024-10-24 | End: 2024-10-24

## 2024-10-24 RX ADMIN — LIDOCAINE HYDROCHLORIDE 1 ML: 10 INJECTION, SOLUTION INFILTRATION; PERINEURAL at 04:10

## 2024-10-24 RX ADMIN — BUPIVACAINE HYDROCHLORIDE 5 MG: 5 INJECTION, SOLUTION EPIDURAL; INTRACAUDAL at 04:10

## 2024-10-24 NOTE — PLAN OF CARE
Patient seen at bedside during rounds with family medicine team. Patient states he ambulates at home with walker and consumes 1 pint of vodka daily. Patient has not had any recent falls. Patient is able to adjust hearing aids on his phone and denies any difficulty hearing.     Griselda Mcclellan M.D.  [facility] PGY2  10/24/2024, 12:07 PM     WYATT spoke with patient's daughter, Roro Goodman [phone], regarding placement and the list emailed yesterday. She did not receive it, resent and she confirmed she did receive it. She will review and this SW will send her updates and denials shortly. She reported to this SW that she was told by nephrology that Pt will not need HD three times a week, just as needed. WYATT reported this may change who can accept, SW to let her know which facilities can meet needs.     WYATT reviewed criteria with CM, Pt may meet for LTAC due to the not chronic status of the HD. LTAC may be an option. Sent to [facility] and [facility] to see if they would consider accepting so this option could be presented to Pt daughter.     Melinda Cast LCSW  Neurocritical Care   [facility]  [id number]

## 2024-10-30 ENCOUNTER — OFFICE VISIT (OUTPATIENT)
Dept: PODIATRY | Facility: CLINIC | Age: 79
End: 2024-10-30
Payer: MEDICARE

## 2024-10-30 VITALS — DIASTOLIC BLOOD PRESSURE: 69 MMHG | OXYGEN SATURATION: 97 % | SYSTOLIC BLOOD PRESSURE: 155 MMHG | HEART RATE: 75 BPM

## 2024-10-30 DIAGNOSIS — Z98.890 S/P NAIL SURGERY: Primary | ICD-10-CM

## 2024-10-30 PROCEDURE — 99999 PR PBB SHADOW E&M-EST. PATIENT-LVL III: CPT | Mod: PBBFAC,,, | Performed by: PODIATRIST

## 2024-11-04 ENCOUNTER — PATIENT MESSAGE (OUTPATIENT)
Dept: PODIATRY | Facility: CLINIC | Age: 79
End: 2024-11-04
Payer: MEDICARE

## 2024-11-08 ENCOUNTER — LAB VISIT (OUTPATIENT)
Dept: LAB | Facility: HOSPITAL | Age: 79
End: 2024-11-08
Attending: INTERNAL MEDICINE
Payer: MEDICARE

## 2024-11-08 DIAGNOSIS — N05.7 PRIMARY PAUCI-IMMUNE NECROTIZING AND CRESCENTIC GLOMERULONEPHRITIS: ICD-10-CM

## 2024-11-08 DIAGNOSIS — N05.8 PRIMARY PAUCI-IMMUNE NECROTIZING AND CRESCENTIC GLOMERULONEPHRITIS: ICD-10-CM

## 2024-11-08 LAB
BACTERIA #/AREA URNS HPF: ABNORMAL /HPF
BILIRUB UR QL STRIP: NEGATIVE
CLARITY UR: CLEAR
COLOR UR: YELLOW
CREAT UR-MCNC: 94.2 MG/DL (ref 15–325)
GLUCOSE UR QL STRIP: NEGATIVE
HGB UR QL STRIP: NEGATIVE
KETONES UR QL STRIP: NEGATIVE
LEUKOCYTE ESTERASE UR QL STRIP: ABNORMAL
MICROSCOPIC COMMENT: ABNORMAL
NITRITE UR QL STRIP: NEGATIVE
PH UR STRIP: 6 [PH] (ref 5–8)
PROT UR QL STRIP: NEGATIVE
PROT UR-MCNC: <7 MG/DL
PROT/CREAT UR: NORMAL MG/G{CREAT} (ref 0–0.2)
RBC #/AREA URNS HPF: 2 /HPF (ref 0–4)
SP GR UR STRIP: 1.01 (ref 1–1.03)
URN SPEC COLLECT METH UR: ABNORMAL
UROBILINOGEN UR STRIP-ACNC: NEGATIVE EU/DL
WBC #/AREA URNS HPF: >100 /HPF (ref 0–5)
WBC CLUMPS URNS QL MICRO: ABNORMAL

## 2024-11-08 PROCEDURE — 81000 URINALYSIS NONAUTO W/SCOPE: CPT | Performed by: INTERNAL MEDICINE

## 2024-11-08 PROCEDURE — 82570 ASSAY OF URINE CREATININE: CPT | Performed by: INTERNAL MEDICINE

## 2024-11-11 ENCOUNTER — OFFICE VISIT (OUTPATIENT)
Dept: RHEUMATOLOGY | Facility: CLINIC | Age: 79
End: 2024-11-11
Payer: MEDICARE

## 2024-11-11 VITALS
WEIGHT: 141.75 LBS | SYSTOLIC BLOOD PRESSURE: 133 MMHG | HEIGHT: 61 IN | DIASTOLIC BLOOD PRESSURE: 65 MMHG | HEART RATE: 73 BPM | BODY MASS INDEX: 26.76 KG/M2

## 2024-11-11 DIAGNOSIS — I77.82 ANTINEUTROPHILIC CYTOPLASMIC ANTIBODY (ANCA) VASCULITIS: Primary | ICD-10-CM

## 2024-11-11 DIAGNOSIS — D84.821 IMMUNOSUPPRESSION DUE TO DRUG THERAPY: ICD-10-CM

## 2024-11-11 DIAGNOSIS — Z79.899 IMMUNOSUPPRESSION DUE TO DRUG THERAPY: ICD-10-CM

## 2024-11-11 PROCEDURE — 99214 OFFICE O/P EST MOD 30 MIN: CPT | Mod: S$GLB,,, | Performed by: INTERNAL MEDICINE

## 2024-11-11 PROCEDURE — 3075F SYST BP GE 130 - 139MM HG: CPT | Mod: CPTII,S$GLB,, | Performed by: INTERNAL MEDICINE

## 2024-11-11 PROCEDURE — 3288F FALL RISK ASSESSMENT DOCD: CPT | Mod: CPTII,S$GLB,, | Performed by: INTERNAL MEDICINE

## 2024-11-11 PROCEDURE — 1159F MED LIST DOCD IN RCRD: CPT | Mod: CPTII,S$GLB,, | Performed by: INTERNAL MEDICINE

## 2024-11-11 PROCEDURE — 1126F AMNT PAIN NOTED NONE PRSNT: CPT | Mod: CPTII,S$GLB,, | Performed by: INTERNAL MEDICINE

## 2024-11-11 PROCEDURE — 3078F DIAST BP <80 MM HG: CPT | Mod: CPTII,S$GLB,, | Performed by: INTERNAL MEDICINE

## 2024-11-11 PROCEDURE — 1101F PT FALLS ASSESS-DOCD LE1/YR: CPT | Mod: CPTII,S$GLB,, | Performed by: INTERNAL MEDICINE

## 2024-11-11 PROCEDURE — 99999 PR PBB SHADOW E&M-EST. PATIENT-LVL III: CPT | Mod: PBBFAC,,, | Performed by: INTERNAL MEDICINE

## 2024-11-11 ASSESSMENT — ROUTINE ASSESSMENT OF PATIENT INDEX DATA (RAPID3)
PSYCHOLOGICAL DISTRESS SCORE: 0
FATIGUE SCORE: 5
MDHAQ FUNCTION SCORE: 0.7
TOTAL RAPID3 SCORE: 3.94
PAIN SCORE: 7
PATIENT GLOBAL ASSESSMENT SCORE: 2.5

## 2024-11-11 NOTE — PROGRESS NOTES
Subjective:       Patient ID: Kristin Goodman is a 79 y.o. female.    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   3)  CHANGES SUGGESTIVE OF FOCAL ACUTE PYELONEPHRITIS.   4)  MILD-TO-MODERATE ARTERIONEPHROSCLEROSIS      Oct 2022 Renal bx showed changes of both SLE and MPO-ANCA vasculitis      Got rituximab June 28, 2024    Currently on prednisone 2.5 mg/d   Still hemodialysis twice / week [place], Tuesday and Saturday     [facility] 5/2/24  C/o muscle cramps; Tylenol and cyclobenzaprine may help ; worse when upset    Nov 8 lab ESR 18, CRP 1, urine protein/creatinine ratio nil     Had colonoscopy and 10 polyps removed    Review of Systems   Constitutional:  Positive for unexpected weight change. Negative for fatigue and fever.        Weight down with last 2 HD   HENT:  Negative for mouth sores and trouble swallowing.         Dry mouth   Eyes:  Negative for redness.        Dry eyes   Respiratory:  Positive for shortness of breath. Negative for cough.    Cardiovascular:  Negative for chest pain.   Gastrointestinal:  Negative for abdominal pain, constipation and diarrhea.   Genitourinary:  Negative for dysuria and genital sores.   Skin:  Negative for color change and rash.   Neurological:  Negative for headaches.   Hematological:  Negative for  "adenopathy. Does not bruise/bleed easily.   Psychiatric/Behavioral:  Negative for sleep disturbance.          Objective:   /65 (Patient Position: Sitting)   Pulse 73   Ht 5' 1" (1.549 m)   Wt 64.3 kg (141 lb 12.1 oz)   BMI 26.78 kg/m²      Physical Exam   Eyes: Conjunctivae are normal.   Cardiovascular: Normal rate and regular rhythm.   Pulmonary/Chest: She has no wheezes. She has no rales.   Lymphadenopathy:     She has no cervical adenopathy.   Skin: No rash noted.         Lab Results   Component Value Date    SEDRATE 18 11/08/2024      Assessment:       1. Antineutrophilic cytoplasmic antibody (ANCA) vasculitis    2. 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

## 2024-11-11 NOTE — ASSESSMENT & PLAN NOTE
No evidence of active disease ; on maintenance Rx with rituximab 500 q6m due this month  Will repeat lab including mpr/pr3 and rituximab sensitivity in 3 mo

## 2024-11-21 ENCOUNTER — INFUSION (OUTPATIENT)
Dept: INFUSION THERAPY | Facility: HOSPITAL | Age: 79
End: 2024-11-21
Payer: MEDICARE

## 2024-11-21 VITALS
DIASTOLIC BLOOD PRESSURE: 65 MMHG | SYSTOLIC BLOOD PRESSURE: 136 MMHG | TEMPERATURE: 98 F | OXYGEN SATURATION: 99 % | RESPIRATION RATE: 18 BRPM | HEIGHT: 61 IN | WEIGHT: 142.19 LBS | HEART RATE: 75 BPM | BODY MASS INDEX: 26.85 KG/M2

## 2024-11-21 DIAGNOSIS — I77.82 ANTINEUTROPHILIC CYTOPLASMIC ANTIBODY (ANCA) VASCULITIS: Primary | ICD-10-CM

## 2024-11-21 PROCEDURE — 96415 CHEMO IV INFUSION ADDL HR: CPT

## 2024-11-21 PROCEDURE — 96413 CHEMO IV INFUSION 1 HR: CPT

## 2024-11-21 PROCEDURE — 25000003 PHARM REV CODE 250: Performed by: INTERNAL MEDICINE

## 2024-11-21 PROCEDURE — 63600175 PHARM REV CODE 636 W HCPCS: Performed by: INTERNAL MEDICINE

## 2024-11-21 PROCEDURE — 96375 TX/PRO/DX INJ NEW DRUG ADDON: CPT

## 2024-11-21 PROCEDURE — 96367 TX/PROPH/DG ADDL SEQ IV INF: CPT

## 2024-11-21 RX ORDER — HEPARIN 100 UNIT/ML
500 SYRINGE INTRAVENOUS
Status: DISCONTINUED | OUTPATIENT
Start: 2024-11-21 | End: 2024-11-21 | Stop reason: HOSPADM

## 2024-11-21 RX ORDER — MEPERIDINE HYDROCHLORIDE 50 MG/ML
25 INJECTION INTRAMUSCULAR; INTRAVENOUS; SUBCUTANEOUS
OUTPATIENT
Start: 2024-12-13 | End: 2024-12-14

## 2024-11-21 RX ORDER — HEPARIN 100 UNIT/ML
500 SYRINGE INTRAVENOUS
OUTPATIENT
Start: 2024-12-13

## 2024-11-21 RX ORDER — MEPERIDINE HYDROCHLORIDE 50 MG/ML
25 INJECTION INTRAMUSCULAR; INTRAVENOUS; SUBCUTANEOUS
Status: DISCONTINUED | OUTPATIENT
Start: 2024-11-21 | End: 2024-11-21 | Stop reason: HOSPADM

## 2024-11-21 RX ORDER — ACETAMINOPHEN 325 MG/1
650 TABLET ORAL
Status: CANCELLED | OUTPATIENT
Start: 2024-12-13

## 2024-11-21 RX ORDER — EPINEPHRINE 0.3 MG/.3ML
0.3 INJECTION SUBCUTANEOUS ONCE AS NEEDED
OUTPATIENT
Start: 2024-12-13

## 2024-11-21 RX ORDER — ACETAMINOPHEN 325 MG/1
650 TABLET ORAL
Status: COMPLETED | OUTPATIENT
Start: 2024-11-21 | End: 2024-11-21

## 2024-11-21 RX ORDER — SODIUM CHLORIDE 0.9 % (FLUSH) 0.9 %
10 SYRINGE (ML) INJECTION
OUTPATIENT
Start: 2024-12-13

## 2024-11-21 RX ORDER — DIPHENHYDRAMINE HYDROCHLORIDE 50 MG/ML
50 INJECTION INTRAMUSCULAR; INTRAVENOUS ONCE AS NEEDED
Status: DISCONTINUED | OUTPATIENT
Start: 2024-11-21 | End: 2024-11-21 | Stop reason: HOSPADM

## 2024-11-21 RX ORDER — EPINEPHRINE 0.3 MG/.3ML
0.3 INJECTION SUBCUTANEOUS ONCE AS NEEDED
Status: DISCONTINUED | OUTPATIENT
Start: 2024-11-21 | End: 2024-11-21 | Stop reason: HOSPADM

## 2024-11-21 RX ORDER — DIPHENHYDRAMINE HYDROCHLORIDE 50 MG/ML
50 INJECTION INTRAMUSCULAR; INTRAVENOUS ONCE AS NEEDED
OUTPATIENT
Start: 2024-12-13

## 2024-11-21 RX ORDER — FAMOTIDINE 10 MG/ML
20 INJECTION INTRAVENOUS
Status: COMPLETED | OUTPATIENT
Start: 2024-11-21 | End: 2024-11-21

## 2024-11-21 RX ORDER — SODIUM CHLORIDE 0.9 % (FLUSH) 0.9 %
10 SYRINGE (ML) INJECTION
Status: DISCONTINUED | OUTPATIENT
Start: 2024-11-21 | End: 2024-11-21 | Stop reason: HOSPADM

## 2024-11-21 RX ORDER — FAMOTIDINE 10 MG/ML
20 INJECTION INTRAVENOUS
Status: CANCELLED | OUTPATIENT
Start: 2024-12-13

## 2024-11-21 RX ADMIN — SODIUM CHLORIDE 500 MG: 9 INJECTION, SOLUTION INTRAVENOUS at 08:11

## 2024-11-21 RX ADMIN — SODIUM CHLORIDE: 9 INJECTION, SOLUTION INTRAVENOUS at 07:11

## 2024-11-21 RX ADMIN — DIPHENHYDRAMINE HYDROCHLORIDE 50 MG: 50 INJECTION, SOLUTION INTRAMUSCULAR; INTRAVENOUS at 07:11

## 2024-11-21 RX ADMIN — FAMOTIDINE 20 MG: 10 INJECTION INTRAVENOUS at 07:11

## 2024-11-21 RX ADMIN — ACETAMINOPHEN 650 MG: 325 TABLET ORAL at 07:11

## 2024-11-21 NOTE — PLAN OF CARE
1040- Pt tolerated Ruxience infusion well, no complications or side effects, POC and meds discussed with pt, pt aware of upcoming appts, pt knows to call MD with any questions or concerns. Pt ambulated off unit, no distress noted.

## 2024-11-22 ENCOUNTER — PATIENT MESSAGE (OUTPATIENT)
Dept: FAMILY MEDICINE | Facility: CLINIC | Age: 79
End: 2024-11-22
Payer: MEDICARE

## 2024-11-22 DIAGNOSIS — D84.9 IMMUNOSUPPRESSION: ICD-10-CM

## 2024-11-22 DIAGNOSIS — E03.9 ACQUIRED HYPOTHYROIDISM: ICD-10-CM

## 2024-11-22 DIAGNOSIS — Z86.718 CURRENT LONG-TERM USE OF ANTICOAGULANT MEDICATION WITH HISTORY OF DEEP VENOUS THROMBOSIS (DVT): ICD-10-CM

## 2024-11-22 DIAGNOSIS — Z79.01 CURRENT LONG-TERM USE OF ANTICOAGULANT MEDICATION WITH HISTORY OF DEEP VENOUS THROMBOSIS (DVT): ICD-10-CM

## 2024-11-22 DIAGNOSIS — J30.9 ALLERGIC RHINITIS, UNSPECIFIED SEASONALITY, UNSPECIFIED TRIGGER: ICD-10-CM

## 2024-11-22 NOTE — TELEPHONE ENCOUNTER
LOV 8/28/2024  Last refill euthyrox 8/22/2024                  Xyzal 8/22/2024                  Eliquis 9/17/2024

## 2024-11-25 ENCOUNTER — HOSPITAL ENCOUNTER (INPATIENT)
Facility: HOSPITAL | Age: 79
LOS: 6 days | Discharge: HOME | DRG: 871 | End: 2024-12-01
Attending: EMERGENCY MEDICINE | Admitting: HOSPITALIST
Payer: MEDICARE

## 2024-11-25 DIAGNOSIS — A41.9 SEPSIS: ICD-10-CM

## 2024-11-25 DIAGNOSIS — R05.8 PRODUCTIVE COUGH: ICD-10-CM

## 2024-11-25 DIAGNOSIS — A41.9 SEVERE SEPSIS: ICD-10-CM

## 2024-11-25 DIAGNOSIS — R07.9 CHEST PAIN: ICD-10-CM

## 2024-11-25 DIAGNOSIS — N18.6 ESRD (END STAGE RENAL DISEASE): ICD-10-CM

## 2024-11-25 DIAGNOSIS — R50.9 FEVER, UNSPECIFIED FEVER CAUSE: Primary | ICD-10-CM

## 2024-11-25 DIAGNOSIS — R78.81 GRAM-NEGATIVE BACTEREMIA: ICD-10-CM

## 2024-11-25 DIAGNOSIS — R06.02 SOB (SHORTNESS OF BREATH): ICD-10-CM

## 2024-11-25 DIAGNOSIS — I10 PRIMARY HYPERTENSION: ICD-10-CM

## 2024-11-25 DIAGNOSIS — M79.10 MYALGIA: ICD-10-CM

## 2024-11-25 DIAGNOSIS — R65.20 SEVERE SEPSIS: ICD-10-CM

## 2024-11-25 PROBLEM — Z71.89 ADVANCE CARE PLANNING: Status: ACTIVE | Noted: 2024-11-25

## 2024-11-25 PROBLEM — D84.821 IMMUNOSUPPRESSION DUE TO DRUG THERAPY: Status: RESOLVED | Noted: 2022-11-10 | Resolved: 2024-11-25

## 2024-11-25 PROBLEM — J20.9 ACUTE BRONCHITIS: Status: ACTIVE | Noted: 2024-11-25

## 2024-11-25 PROBLEM — Z79.899 IMMUNOSUPPRESSION DUE TO DRUG THERAPY: Status: RESOLVED | Noted: 2022-11-10 | Resolved: 2024-11-25

## 2024-11-25 PROBLEM — I38 VALVULAR HEART DISEASE: Status: ACTIVE | Noted: 2024-11-25

## 2024-11-25 LAB
ALBUMIN SERPL BCP-MCNC: 3.2 G/DL (ref 3.5–5.2)
ALLENS TEST: NORMAL
ALP SERPL-CCNC: 68 U/L (ref 40–150)
ALT SERPL W/O P-5'-P-CCNC: 9 U/L (ref 10–44)
ANION GAP SERPL CALC-SCNC: 10 MMOL/L (ref 8–16)
AST SERPL-CCNC: 17 U/L (ref 10–40)
BACTERIA #/AREA URNS HPF: ABNORMAL /HPF
BASOPHILS # BLD AUTO: 0.03 K/UL (ref 0–0.2)
BASOPHILS NFR BLD: 0.2 % (ref 0–1.9)
BILIRUB SERPL-MCNC: 0.4 MG/DL (ref 0.1–1)
BILIRUB UR QL STRIP: NEGATIVE
BNP SERPL-MCNC: 154 PG/ML (ref 0–99)
BUN SERPL-MCNC: 49 MG/DL (ref 8–23)
CALCIUM SERPL-MCNC: 9.1 MG/DL (ref 8.7–10.5)
CHLORIDE SERPL-SCNC: 100 MMOL/L (ref 95–110)
CLARITY UR: CLEAR
CO2 SERPL-SCNC: 27 MMOL/L (ref 23–29)
COLOR UR: YELLOW
CREAT SERPL-MCNC: 5.4 MG/DL (ref 0.5–1.4)
CTP QC/QA: YES
DELSYS: NORMAL
DIFFERENTIAL METHOD BLD: ABNORMAL
EOSINOPHIL # BLD AUTO: 0.2 K/UL (ref 0–0.5)
EOSINOPHIL NFR BLD: 1.5 % (ref 0–8)
ERYTHROCYTE [DISTWIDTH] IN BLOOD BY AUTOMATED COUNT: 14.6 % (ref 11.5–14.5)
EST. GFR  (NO RACE VARIABLE): 8 ML/MIN/1.73 M^2
GLUCOSE SERPL-MCNC: 115 MG/DL (ref 70–110)
GLUCOSE UR QL STRIP: NEGATIVE
HCT VFR BLD AUTO: 30.6 % (ref 37–48.5)
HGB BLD-MCNC: 9.4 G/DL (ref 12–16)
HGB UR QL STRIP: ABNORMAL
HYALINE CASTS #/AREA URNS LPF: 0 /LPF
IMM GRANULOCYTES # BLD AUTO: 0.08 K/UL (ref 0–0.04)
IMM GRANULOCYTES NFR BLD AUTO: 0.5 % (ref 0–0.5)
KETONES UR QL STRIP: NEGATIVE
LDH SERPL L TO P-CCNC: 1.01 MMOL/L (ref 0.5–2.2)
LEUKOCYTE ESTERASE UR QL STRIP: ABNORMAL
LYMPHOCYTES # BLD AUTO: 1.9 K/UL (ref 1–4.8)
LYMPHOCYTES NFR BLD: 11.9 % (ref 18–48)
MAGNESIUM SERPL-MCNC: 1.8 MG/DL (ref 1.6–2.6)
MCH RBC QN AUTO: 29.2 PG (ref 27–31)
MCHC RBC AUTO-ENTMCNC: 30.7 G/DL (ref 32–36)
MCV RBC AUTO: 95 FL (ref 82–98)
MICROSCOPIC COMMENT: ABNORMAL
MOLECULAR STREP A: NEGATIVE
MONOCYTES # BLD AUTO: 2 K/UL (ref 0.3–1)
MONOCYTES NFR BLD: 12.4 % (ref 4–15)
NEUTROPHILS # BLD AUTO: 11.8 K/UL (ref 1.8–7.7)
NEUTROPHILS NFR BLD: 73.5 % (ref 38–73)
NITRITE UR QL STRIP: NEGATIVE
NRBC BLD-RTO: 0 /100 WBC
PH UR STRIP: 7 [PH] (ref 5–8)
PLATELET # BLD AUTO: 197 K/UL (ref 150–450)
PMV BLD AUTO: 9.8 FL (ref 9.2–12.9)
POC MOLECULAR INFLUENZA A AGN: NEGATIVE
POC MOLECULAR INFLUENZA B AGN: NEGATIVE
POCT GLUCOSE: 93 MG/DL (ref 70–110)
POTASSIUM SERPL-SCNC: 4 MMOL/L (ref 3.5–5.1)
PROCALCITONIN SERPL IA-MCNC: 2.09 NG/ML
PROT SERPL-MCNC: 6.9 G/DL (ref 6–8.4)
PROT UR QL STRIP: ABNORMAL
RBC # BLD AUTO: 3.22 M/UL (ref 4–5.4)
RBC #/AREA URNS HPF: >100 /HPF (ref 0–4)
SAMPLE: NORMAL
SARS-COV-2 RDRP RESP QL NAA+PROBE: NEGATIVE
SITE: NORMAL
SODIUM SERPL-SCNC: 137 MMOL/L (ref 136–145)
SP GR UR STRIP: 1.01 (ref 1–1.03)
SQUAMOUS #/AREA URNS HPF: 1 /HPF
TROPONIN I SERPL DL<=0.01 NG/ML-MCNC: 0.01 NG/ML (ref 0–0.03)
TSH SERPL DL<=0.005 MIU/L-ACNC: 0.63 UIU/ML (ref 0.4–4)
URN SPEC COLLECT METH UR: ABNORMAL
UROBILINOGEN UR STRIP-ACNC: NEGATIVE EU/DL
WBC # BLD AUTO: 16.03 K/UL (ref 3.9–12.7)
WBC #/AREA URNS HPF: >100 /HPF (ref 0–5)

## 2024-11-25 PROCEDURE — 63600175 PHARM REV CODE 636 W HCPCS

## 2024-11-25 PROCEDURE — 84443 ASSAY THYROID STIM HORMONE: CPT

## 2024-11-25 PROCEDURE — 99285 EMERGENCY DEPT VISIT HI MDM: CPT | Mod: 25

## 2024-11-25 PROCEDURE — 25000003 PHARM REV CODE 250

## 2024-11-25 PROCEDURE — 87154 CUL TYP ID BLD PTHGN 6+ TRGT: CPT

## 2024-11-25 PROCEDURE — 83880 ASSAY OF NATRIURETIC PEPTIDE: CPT

## 2024-11-25 PROCEDURE — 87040 BLOOD CULTURE FOR BACTERIA: CPT

## 2024-11-25 PROCEDURE — 84484 ASSAY OF TROPONIN QUANT: CPT

## 2024-11-25 PROCEDURE — 85025 COMPLETE CBC W/AUTO DIFF WBC: CPT

## 2024-11-25 PROCEDURE — 87186 SC STD MICRODIL/AGAR DIL: CPT | Mod: 59

## 2024-11-25 PROCEDURE — 94799 UNLISTED PULMONARY SVC/PX: CPT

## 2024-11-25 PROCEDURE — 87635 SARS-COV-2 COVID-19 AMP PRB: CPT

## 2024-11-25 PROCEDURE — 99900035 HC TECH TIME PER 15 MIN (STAT)

## 2024-11-25 PROCEDURE — 21400001 HC TELEMETRY ROOM

## 2024-11-25 PROCEDURE — 87502 INFLUENZA DNA AMP PROBE: CPT

## 2024-11-25 PROCEDURE — 96365 THER/PROPH/DIAG IV INF INIT: CPT

## 2024-11-25 PROCEDURE — 96375 TX/PRO/DX INJ NEW DRUG ADDON: CPT

## 2024-11-25 PROCEDURE — 25000003 PHARM REV CODE 250: Performed by: HOSPITALIST

## 2024-11-25 PROCEDURE — 87088 URINE BACTERIA CULTURE: CPT

## 2024-11-25 PROCEDURE — 83605 ASSAY OF LACTIC ACID: CPT

## 2024-11-25 PROCEDURE — 80053 COMPREHEN METABOLIC PANEL: CPT

## 2024-11-25 PROCEDURE — 83735 ASSAY OF MAGNESIUM: CPT

## 2024-11-25 PROCEDURE — 87651 STREP A DNA AMP PROBE: CPT

## 2024-11-25 PROCEDURE — 93005 ELECTROCARDIOGRAM TRACING: CPT

## 2024-11-25 PROCEDURE — 87086 URINE CULTURE/COLONY COUNT: CPT

## 2024-11-25 PROCEDURE — 25000003 PHARM REV CODE 250: Performed by: EMERGENCY MEDICINE

## 2024-11-25 PROCEDURE — 81000 URINALYSIS NONAUTO W/SCOPE: CPT

## 2024-11-25 PROCEDURE — 84145 PROCALCITONIN (PCT): CPT

## 2024-11-25 PROCEDURE — 82962 GLUCOSE BLOOD TEST: CPT

## 2024-11-25 PROCEDURE — 93010 ELECTROCARDIOGRAM REPORT: CPT | Mod: ,,, | Performed by: INTERNAL MEDICINE

## 2024-11-25 RX ORDER — QUETIAPINE FUMARATE 25 MG/1
25 TABLET, FILM COATED ORAL NIGHTLY PRN
Status: DISCONTINUED | OUTPATIENT
Start: 2024-11-25 | End: 2024-12-01 | Stop reason: HOSPADM

## 2024-11-25 RX ORDER — NALOXONE HCL 0.4 MG/ML
0.02 VIAL (ML) INJECTION
Status: DISCONTINUED | OUTPATIENT
Start: 2024-11-25 | End: 2024-12-01 | Stop reason: HOSPADM

## 2024-11-25 RX ORDER — SIMETHICONE 80 MG
1 TABLET,CHEWABLE ORAL 4 TIMES DAILY PRN
Status: DISCONTINUED | OUTPATIENT
Start: 2024-11-25 | End: 2024-12-01 | Stop reason: HOSPADM

## 2024-11-25 RX ORDER — SODIUM CHLORIDE 0.9 % (FLUSH) 0.9 %
10 SYRINGE (ML) INJECTION EVERY 12 HOURS PRN
Status: DISCONTINUED | OUTPATIENT
Start: 2024-11-25 | End: 2024-12-01 | Stop reason: HOSPADM

## 2024-11-25 RX ORDER — QUETIAPINE FUMARATE 25 MG/1
25 TABLET, FILM COATED ORAL NIGHTLY
Qty: 30 TABLET | Refills: 11 | Status: ON HOLD | OUTPATIENT
Start: 2024-11-25 | End: 2025-11-25

## 2024-11-25 RX ORDER — ACETAMINOPHEN 500 MG
1000 TABLET ORAL
Status: COMPLETED | OUTPATIENT
Start: 2024-11-25 | End: 2024-11-25

## 2024-11-25 RX ORDER — CEFEPIME HYDROCHLORIDE 1 G/1
1 INJECTION, POWDER, FOR SOLUTION INTRAMUSCULAR; INTRAVENOUS
Status: DISCONTINUED | OUTPATIENT
Start: 2024-11-26 | End: 2024-11-28

## 2024-11-25 RX ORDER — MUPIROCIN 20 MG/G
OINTMENT TOPICAL 2 TIMES DAILY
Status: DISPENSED | OUTPATIENT
Start: 2024-11-25 | End: 2024-11-30

## 2024-11-25 RX ORDER — CARVEDILOL 12.5 MG/1
12.5 TABLET ORAL 2 TIMES DAILY
Qty: 60 TABLET | Refills: 11 | Status: ON HOLD | OUTPATIENT
Start: 2024-11-25 | End: 2025-11-25

## 2024-11-25 RX ORDER — ALBUTEROL SULFATE 90 UG/1
2 INHALANT RESPIRATORY (INHALATION) EVERY 6 HOURS PRN
Status: DISCONTINUED | OUTPATIENT
Start: 2024-11-25 | End: 2024-11-25 | Stop reason: CLARIF

## 2024-11-25 RX ORDER — CEFEPIME HYDROCHLORIDE 1 G/1
1 INJECTION, POWDER, FOR SOLUTION INTRAMUSCULAR; INTRAVENOUS
Status: COMPLETED | OUTPATIENT
Start: 2024-11-25 | End: 2024-11-25

## 2024-11-25 RX ORDER — ACETAMINOPHEN 325 MG/1
650 TABLET ORAL EVERY 4 HOURS PRN
Status: DISCONTINUED | OUTPATIENT
Start: 2024-11-25 | End: 2024-12-01 | Stop reason: HOSPADM

## 2024-11-25 RX ORDER — ALBUTEROL SULFATE 2.5 MG/.5ML
2.5 SOLUTION RESPIRATORY (INHALATION) EVERY 6 HOURS PRN
Status: DISCONTINUED | OUTPATIENT
Start: 2024-11-25 | End: 2024-12-01 | Stop reason: HOSPADM

## 2024-11-25 RX ORDER — HYDRALAZINE HYDROCHLORIDE 20 MG/ML
10 INJECTION INTRAMUSCULAR; INTRAVENOUS EVERY 4 HOURS PRN
Status: DISCONTINUED | OUTPATIENT
Start: 2024-11-25 | End: 2024-11-25

## 2024-11-25 RX ORDER — HYDRALAZINE HYDROCHLORIDE 20 MG/ML
10 INJECTION INTRAMUSCULAR; INTRAVENOUS EVERY 4 HOURS PRN
Status: DISCONTINUED | OUTPATIENT
Start: 2024-11-25 | End: 2024-12-01 | Stop reason: HOSPADM

## 2024-11-25 RX ORDER — FLUTICASONE PROPIONATE 50 MCG
2 SPRAY, SUSPENSION (ML) NASAL DAILY
Status: DISCONTINUED | OUTPATIENT
Start: 2024-11-26 | End: 2024-12-01 | Stop reason: HOSPADM

## 2024-11-25 RX ORDER — PANTOPRAZOLE SODIUM 40 MG/1
40 TABLET, DELAYED RELEASE ORAL DAILY
Status: DISCONTINUED | OUTPATIENT
Start: 2024-11-26 | End: 2024-12-01 | Stop reason: HOSPADM

## 2024-11-25 RX ORDER — GLUCAGON 1 MG
1 KIT INJECTION
Status: DISCONTINUED | OUTPATIENT
Start: 2024-11-25 | End: 2024-12-01 | Stop reason: HOSPADM

## 2024-11-25 RX ORDER — IBUPROFEN 200 MG
16 TABLET ORAL
Status: DISCONTINUED | OUTPATIENT
Start: 2024-11-25 | End: 2024-12-01 | Stop reason: HOSPADM

## 2024-11-25 RX ORDER — TORSEMIDE 100 MG/1
100 TABLET ORAL DAILY
Qty: 30 TABLET | Refills: 0 | Status: ON HOLD | OUTPATIENT
Start: 2024-11-25

## 2024-11-25 RX ORDER — SEVELAMER CARBONATE 800 MG/1
800 TABLET, FILM COATED ORAL
Status: DISCONTINUED | OUTPATIENT
Start: 2024-11-26 | End: 2024-12-01 | Stop reason: HOSPADM

## 2024-11-25 RX ORDER — LEVOTHYROXINE SODIUM 25 UG/1
25 TABLET ORAL DAILY
Qty: 90 TABLET | Refills: 3 | Status: ON HOLD | OUTPATIENT
Start: 2024-11-25

## 2024-11-25 RX ORDER — LEVOCETIRIZINE DIHYDROCHLORIDE 5 MG/1
2.5 TABLET, FILM COATED ORAL NIGHTLY
Qty: 15 TABLET | Refills: 11 | Status: ON HOLD | OUTPATIENT
Start: 2024-11-25 | End: 2025-11-25

## 2024-11-25 RX ORDER — ACETAMINOPHEN 325 MG/1
650 TABLET ORAL EVERY 8 HOURS PRN
Status: DISCONTINUED | OUTPATIENT
Start: 2024-11-25 | End: 2024-12-01 | Stop reason: HOSPADM

## 2024-11-25 RX ORDER — IBUPROFEN 200 MG
24 TABLET ORAL
Status: DISCONTINUED | OUTPATIENT
Start: 2024-11-25 | End: 2024-12-01 | Stop reason: HOSPADM

## 2024-11-25 RX ORDER — CYCLOBENZAPRINE HCL 5 MG
5 TABLET ORAL NIGHTLY PRN
Qty: 20 TABLET | Refills: 2 | Status: ON HOLD | OUTPATIENT
Start: 2024-11-25

## 2024-11-25 RX ORDER — PROCHLORPERAZINE EDISYLATE 5 MG/ML
5 INJECTION INTRAMUSCULAR; INTRAVENOUS EVERY 6 HOURS PRN
Status: DISCONTINUED | OUTPATIENT
Start: 2024-11-25 | End: 2024-12-01 | Stop reason: HOSPADM

## 2024-11-25 RX ORDER — ATOVAQUONE 750 MG/5ML
1500 SUSPENSION ORAL DAILY
Qty: 300 ML | Refills: 3 | Status: ON HOLD | OUTPATIENT
Start: 2024-11-25

## 2024-11-25 RX ORDER — ATOVAQUONE 750 MG/5ML
1500 SUSPENSION ORAL DAILY
Status: DISCONTINUED | OUTPATIENT
Start: 2024-11-26 | End: 2024-12-01 | Stop reason: HOSPADM

## 2024-11-25 RX ORDER — LEVOTHYROXINE SODIUM 25 UG/1
25 TABLET ORAL
Status: DISCONTINUED | OUTPATIENT
Start: 2024-11-26 | End: 2024-12-01 | Stop reason: HOSPADM

## 2024-11-25 RX ORDER — TALC
6 POWDER (GRAM) TOPICAL NIGHTLY PRN
Status: DISCONTINUED | OUTPATIENT
Start: 2024-11-25 | End: 2024-12-01 | Stop reason: HOSPADM

## 2024-11-25 RX ADMIN — ACETAMINOPHEN 1000 MG: 500 TABLET ORAL at 12:11

## 2024-11-25 RX ADMIN — CEFEPIME 1 G: 1 INJECTION, POWDER, FOR SOLUTION INTRAMUSCULAR; INTRAVENOUS at 12:11

## 2024-11-25 RX ADMIN — APIXABAN 5 MG: 5 TABLET, FILM COATED ORAL at 08:11

## 2024-11-25 RX ADMIN — MUPIROCIN: 20 OINTMENT TOPICAL at 08:11

## 2024-11-25 RX ADMIN — VANCOMYCIN HYDROCHLORIDE 1000 MG: 1 INJECTION, POWDER, LYOPHILIZED, FOR SOLUTION INTRAVENOUS at 12:11

## 2024-11-25 RX ADMIN — SODIUM CHLORIDE 500 ML: 9 INJECTION, SOLUTION INTRAVENOUS at 01:11

## 2024-11-25 NOTE — ASSESSMENT & PLAN NOTE
Presents to ED with fever, tachycardia, low BP  ANCA + vasculitis pt on immune suppressive therapy, and ESRD on HD  Source BSI, UTI, both, other?  CXR NAD  BCX done in ED  Exam w/o obvious source other than the urine and bloodstream as above  Given vanc and cefepime in ED  Continue both w/ pharmacy dosing pending further clinical data  No fluid bolus in HD patient  Clinically improved in ED

## 2024-11-25 NOTE — ASSESSMENT & PLAN NOTE
Uncertain of timing of this diagnosis or treatment regimen  Need to clarify if pt is on CPAP at home

## 2024-11-25 NOTE — Clinical Note
Diagnosis: Sepsis [942965]   Future Attending Provider: MAGALIE NGUYEN [[id number]]   Reason for IP Medical Treatment  (Clinical interventions that can only be accomplished in the IP setting? ) :: IV antbiotics, telemetry, HD   Plans for Post-Acute care--if anticipated (pick the single best option):: A. No post acute care anticipated at this time   Special Needs:: No Special Needs [1]

## 2024-11-25 NOTE — ED NOTES
Pt aware of need for urine specimen. Instructed to use call light when she feels she is able to void.

## 2024-11-25 NOTE — HPI
80 yo w/ ANCA+ microscopic polyangitis dx 2 yrs ago, w/ associated GN causing ESRD, on HD x 2 yrs but non oliguric  She seen rheum and gets rituximab every 6 mo, just had her infusion last week.  She goes to HD every T/Th/Sat and tolerates it well.  She also has HTN, hypothyroidism, h/o left brachiocephalic vein occlusion (I believe this is why she takes Eliquis?), DJD, VHD, dyslipidemia.  Despite all this she generally feels pretty good and is active - After HD Sat she felt sluggish which is not unusual, but woke up Sunday feeling like she had a cold w/ cough productive of yellow sputum and congestion. She went to be and got up this AM feeling more sluggish and had a temp of 101.4, daughter brought her to ED, temp 102.3, sepsis alert activated, no bolus d/t ESRD, CXR w/o acute disease, urine w/ WBC and RBC but no bacteria, source of infection uncertain. She has had ESBL + e coli UTI and enterococcal bacteremia in the past but it's been awhile.  SARS CoV2 negative, PCT 2.1  Given vanc and cefepime.  Nephrology aware of admit, will likely need HD tomorrow.  BCX done in ED.  HM to admit.  Pt currently lying on stretcher, feeling better, daughter at bedside.

## 2024-11-25 NOTE — ASSESSMENT & PLAN NOTE
Undergoes rituximab maintenance w/ Dr Palacio q 6 mo  Is on atovaquone for PJP prophylaxis presumably  Relatively immune compromised but I currently don't suspect an opportunistic infection

## 2024-11-25 NOTE — ASSESSMENT & PLAN NOTE
Consult nephrology for HD  Pt states she goes every T/Th/Sat and has no problems tolerating it other than feeling a bit sluggish afterwards

## 2024-11-25 NOTE — ASSESSMENT & PLAN NOTE
Sees Dr Mills routinely  TTE 1/2023:  Normal systolic function.  The estimated ejection fraction is 60%.  Grade II left ventricular diastolic dysfunction.  Small circumferential pericardial effusion.  Moderate to severe left atrial enlargement.  Mild mitral regurgitation.  Mild to moderate tricuspid regurgitation.  Normal right ventricular size with normal right ventricular systolic function.  The quantitatively derived ejection fraction is 56%.  Normal central venous pressure (3 mmHg).  The estimated PA systolic pressure is 36 mmHg.  No obvious vegitations.  At this juncture no decompensation or strong suspicion for BE  If BCX + would check TTE or TOSIN

## 2024-11-25 NOTE — ED TRIAGE NOTES
Pt presents to ED via POV with c/o fever, fatigue, and productive cough that started yesterday. Pt goes to dialysis [place], [place]. Last dialyzed Saturday. Denies SOB, CP, abd pain, N/V, sore throat, HA, or urinary symptoms. No tx used.
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
## 2024-11-25 NOTE — ASSESSMENT & PLAN NOTE
BP soft in ED  Pt presents w/ severe sepsis  Holding BP meds for now  She does look remarkably good this afternoon after some antibiotics  Will order prn hydralazine

## 2024-11-25 NOTE — PROGRESS NOTES
Pharmacokinetic Initial Assessment: IV Vancomycin    Assessment/Plan:    Initiate intravenous vancomycin with loading dose of 1000 mg once with subsequent doses when random concentrations are less than 20 mcg/mL  Desired empiric serum trough concentration is 10 to 20 mcg/mL  Draw vancomycin random level on 11-26-24 at 0600.  Pharmacy will continue to follow and monitor vancomycin.      Please contact pharmacy at extension [phone] with any questions regarding this assessment.     Thank you for the consult,   Marlen Borja       Patient brief summary:  Kristin Goodman is a 79 y.o. female initiated on antimicrobial therapy with IV Vancomycin for treatment of suspected sepsis    Drug Allergies:   Review of patient's allergies indicates:   Allergen Reactions    Ampicillin     Lactose Diarrhea    Peaches [peach (prunus persica)] Other (See Comments)     Pt unable to state type of reaction. Information obtained from daughter who states she was informed of allergy from patient.    Penicillins      Other reaction(s): Hives, anaphylaxis    Sulfa (sulfonamide antibiotics) Rash and Hives       Actual Body Weight:   61.7 kg    Renal Function:   Estimated Creatinine Clearance: 7.4 mL/min (A) (based on SCr of 5.4 mg/dL (H)).,     Dialysis Method (if applicable):  N/A    CBC (last 72 hours):  Recent Labs   Lab Result Units 11/25/24  1219   WBC K/uL 16.03*   Hemoglobin g/dL 9.4*   Hematocrit % 30.6*   Platelets K/uL 197   Gran % % 73.5*   Lymph % % 11.9*   Mono % % 12.4   Eosinophil % % 1.5   Basophil % % 0.2   Differential Method  Automated       Metabolic Panel (last 72 hours):  Recent Labs   Lab Result Units 11/25/24  1219 11/25/24  1320   Sodium mmol/L 137  --    Potassium mmol/L 4.0  --    Chloride mmol/L 100  --    CO2 mmol/L 27  --    Glucose mg/dL 115*  --    Glucose, UA   --  Negative   BUN mg/dL 49*  --    Creatinine mg/dL 5.4*  --    Albumin g/dL 3.2*  --    Total Bilirubin mg/dL 0.4  --    Alkaline Phosphatase U/L 68  --   "  AST U/L 17  --    ALT U/L 9*  --    Magnesium mg/dL 1.8  --        Drug levels (last 3 results):  No results for input(s): "VANCOMYCINRA", "VANCORANDOM", "VANCOMYCINPE", "VANCOPEAK", "VANCOMYCINTR", "VANCOTROUGH" in the last 72 hours.    Microbiologic Results:  Microbiology Results (last 7 days)       Procedure Component Value Units Date/Time    Urine culture [[id number]] Collected: 11/25/24 1320    Order Status: No result Specimen: Urine Updated: 11/25/24 1354    Blood culture x two cultures. Draw prior to antibiotics. [[id number]] Collected: 11/25/24 1213    Order Status: Sent Specimen: Blood from Peripheral, Hand, Right Updated: 11/25/24 1225    Blood culture x two cultures. Draw prior to antibiotics. [[id number]] Collected: 11/25/24 1219    Order Status: Sent Specimen: Blood from Peripheral, Antecubital, Right Updated: 11/25/24 1225            "

## 2024-11-26 PROBLEM — K21.9 GASTRIC REFLUX: Status: RESOLVED | Noted: 2022-11-10 | Resolved: 2024-11-26

## 2024-11-26 PROBLEM — J20.9 ACUTE BRONCHITIS: Status: RESOLVED | Noted: 2024-11-25 | Resolved: 2024-11-26

## 2024-11-26 PROBLEM — G47.33 OSA (OBSTRUCTIVE SLEEP APNEA): Status: RESOLVED | Noted: 2020-11-09 | Resolved: 2024-11-26

## 2024-11-26 LAB
ACINETOBACTER CALCOACETICUS/BAUMANNII COMPLEX: NOT DETECTED
ADENOVIRUS: NOT DETECTED
ANION GAP SERPL CALC-SCNC: 11 MMOL/L (ref 8–16)
BACTEROIDES FRAGILIS: NOT DETECTED
BASOPHILS # BLD AUTO: 0.02 K/UL (ref 0–0.2)
BASOPHILS NFR BLD: 0.2 % (ref 0–1.9)
BORDETELLA PARAPERTUSSIS (IS1001): NOT DETECTED
BORDETELLA PERTUSSIS (PTXP): NOT DETECTED
BUN SERPL-MCNC: 55 MG/DL (ref 8–23)
CALCIUM SERPL-MCNC: 8.7 MG/DL (ref 8.7–10.5)
CANDIDA ALBICANS: NOT DETECTED
CANDIDA AURIS: NOT DETECTED
CANDIDA GLABRATA: NOT DETECTED
CANDIDA KRUSEI: NOT DETECTED
CANDIDA PARAPSILOSIS: NOT DETECTED
CANDIDA TROPICALIS: NOT DETECTED
CHLAMYDIA PNEUMONIAE: NOT DETECTED
CHLORIDE SERPL-SCNC: 104 MMOL/L (ref 95–110)
CO2 SERPL-SCNC: 23 MMOL/L (ref 23–29)
CORONAVIRUS 229E, COMMON COLD VIRUS: NOT DETECTED
CORONAVIRUS HKU1, COMMON COLD VIRUS: NOT DETECTED
CORONAVIRUS NL63, COMMON COLD VIRUS: NOT DETECTED
CORONAVIRUS OC43, COMMON COLD VIRUS: NOT DETECTED
CREAT SERPL-MCNC: 5.2 MG/DL (ref 0.5–1.4)
CRYPTOCOCCUS NEOFORMANS/GATTII: NOT DETECTED
CTX-M GENE (ESBL PRODUCER): NOT DETECTED
DIFFERENTIAL METHOD BLD: ABNORMAL
ENTEROBACTER CLOACAE COMPLEX: NOT DETECTED
ENTEROBACTERALES: ABNORMAL
ENTEROCOCCUS FAECALIS: NOT DETECTED
ENTEROCOCCUS FAECIUM: NOT DETECTED
EOSINOPHIL # BLD AUTO: 0.2 K/UL (ref 0–0.5)
EOSINOPHIL NFR BLD: 1.8 % (ref 0–8)
ERYTHROCYTE [DISTWIDTH] IN BLOOD BY AUTOMATED COUNT: 14.4 % (ref 11.5–14.5)
ESCHERICHIA COLI: DETECTED
EST. GFR  (NO RACE VARIABLE): 8 ML/MIN/1.73 M^2
FLUBV RNA NPH QL NAA+NON-PROBE: NOT DETECTED
GLUCOSE SERPL-MCNC: 80 MG/DL (ref 70–110)
HAEMOPHILUS INFLUENZAE: NOT DETECTED
HCT VFR BLD AUTO: 28.9 % (ref 37–48.5)
HGB BLD-MCNC: 9.3 G/DL (ref 12–16)
HPIV1 RNA NPH QL NAA+NON-PROBE: NOT DETECTED
HPIV2 RNA NPH QL NAA+NON-PROBE: NOT DETECTED
HPIV3 RNA NPH QL NAA+NON-PROBE: NOT DETECTED
HPIV4 RNA NPH QL NAA+NON-PROBE: NOT DETECTED
HUMAN METAPNEUMOVIRUS: NOT DETECTED
IMM GRANULOCYTES # BLD AUTO: 0.06 K/UL (ref 0–0.04)
IMM GRANULOCYTES NFR BLD AUTO: 0.5 % (ref 0–0.5)
IMP GENE (CARBAPENEM RESISTANT): NOT DETECTED
INFLUENZA A (SUBTYPES H1,H1-2009,H3): NOT DETECTED
KLEBSIELLA AEROGENES: NOT DETECTED
KLEBSIELLA OXYTOCA: NOT DETECTED
KLEBSIELLA PNEUMONIAE GROUP: NOT DETECTED
KPC RESISTANCE GENE (CARBAPENEM): NOT DETECTED
LISTERIA MONOCYTOGENES: NOT DETECTED
LYMPHOCYTES # BLD AUTO: 1.2 K/UL (ref 1–4.8)
LYMPHOCYTES NFR BLD: 10.7 % (ref 18–48)
MAGNESIUM SERPL-MCNC: 1.9 MG/DL (ref 1.6–2.6)
MCH RBC QN AUTO: 30.3 PG (ref 27–31)
MCHC RBC AUTO-ENTMCNC: 32.2 G/DL (ref 32–36)
MCR-1: NOT DETECTED
MCV RBC AUTO: 94 FL (ref 82–98)
MEC A/C AND MREJ (MRSA): ABNORMAL
MEC A/C: ABNORMAL
MONOCYTES # BLD AUTO: 1.7 K/UL (ref 0.3–1)
MONOCYTES NFR BLD: 15.6 % (ref 4–15)
MYCOPLASMA PNEUMONIAE: NOT DETECTED
NDM GENE (CARBAPENEM RESISTANT): NOT DETECTED
NEISSERIA MENINGITIDIS: NOT DETECTED
NEUTROPHILS # BLD AUTO: 7.9 K/UL (ref 1.8–7.7)
NEUTROPHILS NFR BLD: 71.2 % (ref 38–73)
NRBC BLD-RTO: 0 /100 WBC
OHS QRS DURATION: 60 MS
OHS QTC CALCULATION: 421 MS
OXA-48-LIKE (CARBAPENEM RESISTANT): NOT DETECTED
PHOSPHATE SERPL-MCNC: 3.6 MG/DL (ref 2.7–4.5)
PLATELET # BLD AUTO: 189 K/UL (ref 150–450)
PMV BLD AUTO: 10 FL (ref 9.2–12.9)
POTASSIUM SERPL-SCNC: 3.5 MMOL/L (ref 3.5–5.1)
PROTEUS SPECIES: NOT DETECTED
PSEUDOMONAS AERUGINOSA: NOT DETECTED
RBC # BLD AUTO: 3.07 M/UL (ref 4–5.4)
RESPIRATORY INFECTION PANEL SOURCE: NORMAL
RSV RNA NPH QL NAA+NON-PROBE: NOT DETECTED
RV+EV RNA NPH QL NAA+NON-PROBE: NOT DETECTED
SALMONELLA SP: NOT DETECTED
SARS-COV-2 RNA RESP QL NAA+PROBE: NOT DETECTED
SERRATIA MARCESCENS: NOT DETECTED
SODIUM SERPL-SCNC: 138 MMOL/L (ref 136–145)
STAPHYLOCOCCUS AUREUS: NOT DETECTED
STAPHYLOCOCCUS EPIDERMIDIS: NOT DETECTED
STAPHYLOCOCCUS LUGDUNESIS: NOT DETECTED
STAPHYLOCOCCUS SPECIES: NOT DETECTED
STENOTROPHOMONAS MALTOPHILIA: NOT DETECTED
STREPTOCOCCUS AGALACTIAE: NOT DETECTED
STREPTOCOCCUS PNEUMONIAE: NOT DETECTED
STREPTOCOCCUS PYOGENES: NOT DETECTED
STREPTOCOCCUS SPECIES: NOT DETECTED
VAN A/B (VRE GENE): ABNORMAL
VANCOMYCIN SERPL-MCNC: 17.3 UG/ML
VIM GENE (CARBAPENEM RESISTANT): NOT DETECTED
WBC # BLD AUTO: 11.07 K/UL (ref 3.9–12.7)

## 2024-11-26 PROCEDURE — 80202 ASSAY OF VANCOMYCIN: CPT | Performed by: HOSPITALIST

## 2024-11-26 PROCEDURE — 84100 ASSAY OF PHOSPHORUS: CPT | Performed by: HOSPITALIST

## 2024-11-26 PROCEDURE — 87070 CULTURE OTHR SPECIMN AEROBIC: CPT | Performed by: STUDENT IN AN ORGANIZED HEALTH CARE EDUCATION/TRAINING PROGRAM

## 2024-11-26 PROCEDURE — 25000242 PHARM REV CODE 250 ALT 637 W/ HCPCS: Performed by: HOSPITALIST

## 2024-11-26 PROCEDURE — 94761 N-INVAS EAR/PLS OXIMETRY MLT: CPT

## 2024-11-26 PROCEDURE — 63600175 PHARM REV CODE 636 W HCPCS: Mod: JG | Performed by: STUDENT IN AN ORGANIZED HEALTH CARE EDUCATION/TRAINING PROGRAM

## 2024-11-26 PROCEDURE — 36415 COLL VENOUS BLD VENIPUNCTURE: CPT | Performed by: HOSPITALIST

## 2024-11-26 PROCEDURE — 25000003 PHARM REV CODE 250: Performed by: STUDENT IN AN ORGANIZED HEALTH CARE EDUCATION/TRAINING PROGRAM

## 2024-11-26 PROCEDURE — 90935 HEMODIALYSIS ONE EVALUATION: CPT

## 2024-11-26 PROCEDURE — 21400001 HC TELEMETRY ROOM

## 2024-11-26 PROCEDURE — 87205 SMEAR GRAM STAIN: CPT | Performed by: STUDENT IN AN ORGANIZED HEALTH CARE EDUCATION/TRAINING PROGRAM

## 2024-11-26 PROCEDURE — 63600175 PHARM REV CODE 636 W HCPCS: Performed by: HOSPITALIST

## 2024-11-26 PROCEDURE — 87798 DETECT AGENT NOS DNA AMP: CPT | Mod: 59 | Performed by: STUDENT IN AN ORGANIZED HEALTH CARE EDUCATION/TRAINING PROGRAM

## 2024-11-26 PROCEDURE — 99900035 HC TECH TIME PER 15 MIN (STAT)

## 2024-11-26 PROCEDURE — 25000003 PHARM REV CODE 250: Performed by: HOSPITALIST

## 2024-11-26 PROCEDURE — 5A1D70Z PERFORMANCE OF URINARY FILTRATION, INTERMITTENT, LESS THAN 6 HOURS PER DAY: ICD-10-PCS | Performed by: STUDENT IN AN ORGANIZED HEALTH CARE EDUCATION/TRAINING PROGRAM

## 2024-11-26 PROCEDURE — 99223 1ST HOSP IP/OBS HIGH 75: CPT | Mod: ,,, | Performed by: STUDENT IN AN ORGANIZED HEALTH CARE EDUCATION/TRAINING PROGRAM

## 2024-11-26 PROCEDURE — 85025 COMPLETE CBC W/AUTO DIFF WBC: CPT | Performed by: HOSPITALIST

## 2024-11-26 PROCEDURE — 83735 ASSAY OF MAGNESIUM: CPT | Performed by: HOSPITALIST

## 2024-11-26 PROCEDURE — 80048 BASIC METABOLIC PNL TOTAL CA: CPT | Performed by: HOSPITALIST

## 2024-11-26 RX ORDER — TORSEMIDE 10 MG/1
100 TABLET ORAL DAILY
Status: DISCONTINUED | OUTPATIENT
Start: 2024-11-26 | End: 2024-12-01 | Stop reason: HOSPADM

## 2024-11-26 RX ORDER — SODIUM CHLORIDE 9 MG/ML
INJECTION, SOLUTION INTRAVENOUS ONCE
Status: CANCELLED | OUTPATIENT
Start: 2024-11-26 | End: 2024-11-26

## 2024-11-26 RX ADMIN — VANCOMYCIN HYDROCHLORIDE 500 MG: 500 INJECTION, POWDER, LYOPHILIZED, FOR SOLUTION INTRAVENOUS at 06:11

## 2024-11-26 RX ADMIN — SEVELAMER CARBONATE 800 MG: 800 TABLET, FILM COATED ORAL at 12:11

## 2024-11-26 RX ADMIN — CARBAMIDE PEROXIDE 6.5% 5 DROP: 6.5 LIQUID AURICULAR (OTIC) at 09:11

## 2024-11-26 RX ADMIN — FLUTICASONE PROPIONATE 100 MCG: 50 SPRAY, METERED NASAL at 08:11

## 2024-11-26 RX ADMIN — SEVELAMER CARBONATE 800 MG: 800 TABLET, FILM COATED ORAL at 08:11

## 2024-11-26 RX ADMIN — CARBAMIDE PEROXIDE 6.5% 5 DROP: 6.5 LIQUID AURICULAR (OTIC) at 12:11

## 2024-11-26 RX ADMIN — CEFEPIME 1 G: 1 INJECTION, POWDER, FOR SOLUTION INTRAMUSCULAR; INTRAVENOUS at 12:11

## 2024-11-26 RX ADMIN — LEVOTHYROXINE SODIUM 25 MCG: 25 TABLET ORAL at 06:11

## 2024-11-26 RX ADMIN — PANTOPRAZOLE SODIUM 40 MG: 40 TABLET, DELAYED RELEASE ORAL at 08:11

## 2024-11-26 RX ADMIN — ACETAMINOPHEN 650 MG: 325 TABLET ORAL at 12:11

## 2024-11-26 RX ADMIN — MUPIROCIN: 20 OINTMENT TOPICAL at 09:11

## 2024-11-26 RX ADMIN — ATOVAQUONE 1500 MG: 750 SUSPENSION ORAL at 08:11

## 2024-11-26 RX ADMIN — EPOETIN ALFA-EPBX 3200 UNITS: 10000 INJECTION, SOLUTION INTRAVENOUS; SUBCUTANEOUS at 03:11

## 2024-11-26 RX ADMIN — APIXABAN 5 MG: 5 TABLET, FILM COATED ORAL at 09:11

## 2024-11-26 RX ADMIN — APIXABAN 5 MG: 5 TABLET, FILM COATED ORAL at 08:11

## 2024-11-26 RX ADMIN — SEVELAMER CARBONATE 800 MG: 800 TABLET, FILM COATED ORAL at 05:11

## 2024-11-26 RX ADMIN — CARBAMIDE PEROXIDE 6.5% 5 DROP: 6.5 LIQUID AURICULAR (OTIC) at 08:11

## 2024-11-26 RX ADMIN — TORSEMIDE 100 MG: 10 TABLET ORAL at 05:11
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
## 2024-11-26 NOTE — PLAN OF CARE
Pt alert able to make needs known, tolerates medications well by mouth, IV antibiotics remain in progress, no signs or symptoms of adverse reactions noted, repositioned self q 2hrs, pain controlled by PRN medication, plan of care explained, diet tolerated, pt denies n,v,d this shift, remains free from falls and hospital acquired pressure injuries, safety maintained. Will continue following plan of care.      Problem: Adult Inpatient Plan of Care  Goal: Plan of Care Review  Outcome: Progressing  Goal: Patient-Specific Goal (Individualized)  Outcome: Progressing  Goal: Absence of Hospital-Acquired Illness or Injury  Outcome: Progressing  Goal: Optimal Comfort and Wellbeing  Outcome: Progressing  Goal: Readiness for Transition of Care  Outcome: Progressing     Problem: Sepsis/Septic Shock  Goal: Optimal Coping  Outcome: Progressing  Goal: Absence of Bleeding  Outcome: Progressing  Goal: Blood Glucose Level Within Targeted Range  Outcome: Progressing  Goal: Absence of Infection Signs and Symptoms  Outcome: Progressing  Goal: Optimal Nutrition Intake  Outcome: Progressing     Problem: Infection  Goal: Absence of Infection Signs and Symptoms  Outcome: Progressing     Problem: Wound  Goal: Optimal Coping  Outcome: Progressing  Goal: Optimal Functional Ability  Outcome: Progressing  Goal: Absence of Infection Signs and Symptoms  Outcome: Progressing  Goal: Improved Oral Intake  Outcome: Progressing  Goal: Optimal Pain Control and Function  Outcome: Progressing  Goal: Skin Health and Integrity  Outcome: Progressing  Goal: Optimal Wound Healing  Outcome: Progressing

## 2024-11-26 NOTE — ASSESSMENT & PLAN NOTE
ESRD - dialysis twice weekly - Tuesday and Saturdays    Plan/Recommendation  -HD today  -follow up RFP daily  -Keep MAP > 65  -Keep hemoglobin > 7  -Strict ins and outs  -Avoid nephrotoxic agents if possible and renally dose medications  -Avoid drastic hemodynamic changes if possible      #Anemia  Hemoglobin   Date Value Ref Range Status   11/26/2024 9.3 (L) 12.0 - 16.0 g/dL Final     Iron   Date Value Ref Range Status   10/04/2024 59 30 - 160 ug/dL Final     Transferrin   Date Value Ref Range Status   10/04/2024 209 200 - 375 mg/dL Final     TIBC   Date Value Ref Range Status   10/04/2024 309 250 - 450 ug/dL Final     Saturated Iron   Date Value Ref Range Status   10/04/2024 19 (L) 20 - 50 % Final     Ferritin   Date Value Ref Range Status   10/04/2024 1,958 (H) 20.0 - 300.0 ng/mL Final   Hold iron replacement while awaiting sepsis workup  MAXIM weekly    #Secondary hyperparathyroidism  Calcium   Date Value Ref Range Status   11/26/2024 8.7 8.7 - 10.5 mg/dL Final     Phosphorus   Date Value Ref Range Status   11/26/2024 3.6 2.7 - 4.5 mg/dL Final     Obtain PTH

## 2024-11-26 NOTE — NURSING
[facility] [place]  Nurses Note -- 4 Eyes      11/26/2024       Skin assessed on: Q Shift      [x] No Pressure Injuries Present    [x]Prevention Measures Documented    [] Yes LDA  for Pressure Injury Previously documented     [] Yes New Pressure Injury Discovered   [] LDA for New Pressure Injury Added      Attending RN:  Bernadine Tamez LPN     Second RN:  CRISTINE Walden and CRISTINE Savage

## 2024-11-26 NOTE — PLAN OF CARE
Problem: Adult Inpatient Plan of Care  Goal: Plan of Care Review  Outcome: Progressing  Flowsheets (Taken 11/26/2024 1601)  Plan of Care Reviewed With: patient  Goal: Patient-Specific Goal (Individualized)  Outcome: Progressing  Goal: Absence of Hospital-Acquired Illness or Injury  Outcome: Progressing  Intervention: Identify and Manage Fall Risk  Flowsheets (Taken 11/26/2024 1601)  Safety Promotion/Fall Prevention:   assistive device/personal item within reach   bed alarm refused   family to remain at bedside   nonskid shoes/socks when out of bed   room near unit station   side rails raised x 2  Intervention: Prevent Skin Injury  Flowsheets (Taken 11/26/2024 1601)  Body Position: position changed independently  Skin Protection: incontinence pads utilized  Device Skin Pressure Protection: absorbent pad utilized/changed  Intervention: Prevent and Manage VTE (Venous Thromboembolism) Risk  Flowsheets (Taken 11/26/2024 1601)  VTE Prevention/Management:   remove, assess skin, and reapply sequential compression device   ambulation promoted   fluids promoted  Intervention: Prevent Infection  Flowsheets (Taken 11/26/2024 1601)  Infection Prevention:   environmental surveillance performed   equipment surfaces disinfected   hand hygiene promoted  Goal: Optimal Comfort and Wellbeing  Outcome: Progressing  Intervention: Provide Person-Centered Care  Flowsheets (Taken 11/26/2024 1601)  Trust Relationship/Rapport:   care explained   questions answered   questions encouraged  Goal: Readiness for Transition of Care  Outcome: Progressing     Problem: Sepsis/Septic Shock  Goal: Optimal Coping  Outcome: Progressing  Goal: Absence of Bleeding  Outcome: Progressing  Goal: Blood Glucose Level Within Targeted Range  Outcome: Progressing  Goal: Absence of Infection Signs and Symptoms  Outcome: Progressing  Goal: Optimal Nutrition Intake  Outcome: Progressing     Problem: Infection  Goal: Absence of Infection Signs and Symptoms  Outcome:  Progressing     Problem: Wound  Goal: Optimal Coping  Outcome: Progressing  Goal: Optimal Functional Ability  Outcome: Progressing  Goal: Absence of Infection Signs and Symptoms  Outcome: Progressing  Goal: Improved Oral Intake  Outcome: Progressing  Goal: Optimal Pain Control and Function  Outcome: Progressing  Goal: Skin Health and Integrity  Outcome: Progressing  Goal: Optimal Wound Healing  Outcome: Progressing     Problem: Hemodialysis  Goal: Safe, Effective Therapy Delivery  Outcome: Progressing  Goal: Effective Tissue Perfusion  Outcome: Progressing  Goal: Absence of Infection Signs and Symptoms  Outcome: Progressing

## 2024-11-26 NOTE — ASSESSMENT & PLAN NOTE
Consulted nephrology for HD  Pt states she goes every T/Th/Sat and has no problems tolerating it other than feeling a bit sluggish afterwards

## 2024-11-26 NOTE — ASSESSMENT & PLAN NOTE
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  UA with >  100 WBC, no bacteria  Procalcitonin 2 .09 Chest x-ray with no evidence of focal consolidation  On vanc/cefepime  Leukocytosis improved.  Obtain CT abdomen  Consult ID

## 2024-11-26 NOTE — PROGRESS NOTES
Pharmacokinetic Assessment Follow Up: IV Vancomycin    Vancomycin serum concentration assessment(s):    The random level was drawn correctly and can be used to guide therapy at this time. The measurement is within the desired definitive target range of 10 to 20 mcg/mL.    Vancomycin Regimen Plan:    Vancomycin 500 mg dose scheduled    Re-dose when the random level is less than 20 mcg/mL, next level to be drawn at 0400 on 11/27/24    Drug levels (last 3 results):  Recent Labs   Lab Result Units 11/26/24  0404   Vancomycin, Random ug/mL 17.3       Pharmacy will continue to follow and monitor vancomycin.    Please contact pharmacy at extension [phone] for questions regarding this assessment.    Thank you for the consult,   Mickey Young       Patient brief summary:  Kristin Goodman is a 79 y.o. female initiated on antimicrobial therapy with IV Vancomycin for treatment of sepsis    The patient's current regimen is random pulse dosing    Drug Allergies:   Review of patient's allergies indicates:   Allergen Reactions    Ampicillin     Lactose Diarrhea    Peaches [peach (prunus persica)] Other (See Comments)     Pt unable to state type of reaction. Information obtained from daughter who states she was informed of allergy from patient.    Penicillins      Other reaction(s): Hives, anaphylaxis    Sulfa (sulfonamide antibiotics) Rash and Hives       Actual Body Weight:   64 kg    Renal Function:   Estimated Creatinine Clearance: 7.8 mL/min (A) (based on SCr of 5.2 mg/dL (H)).,     Dialysis Method (if applicable):  N/A    CBC (last 72 hours):  Recent Labs   Lab Result Units 11/25/24  1219 11/26/24  0404   WBC K/uL 16.03* 11.07   Hemoglobin g/dL 9.4* 9.3*   Hematocrit % 30.6* 28.9*   Platelets K/uL 197 189   Gran % % 73.5* 71.2   Lymph % % 11.9* 10.7*   Mono % % 12.4 15.6*   Eosinophil % % 1.5 1.8   Basophil % % 0.2 0.2   Differential Method  Automated Automated       Metabolic Panel (last 72 hours):  Recent Labs   Lab  Result Units 11/25/24  1219 11/25/24  1320 11/26/24  0404   Sodium mmol/L 137  --  138   Potassium mmol/L 4.0  --  3.5   Chloride mmol/L 100  --  104   CO2 mmol/L 27  --  23   Glucose mg/dL 115*  --  80   Glucose, UA   --  Negative  --    BUN mg/dL 49*  --  55*   Creatinine mg/dL 5.4*  --  5.2*   Albumin g/dL 3.2*  --   --    Total Bilirubin mg/dL 0.4  --   --    Alkaline Phosphatase U/L 68  --   --    AST U/L 17  --   --    ALT U/L 9*  --   --    Magnesium mg/dL 1.8  --  1.9   Phosphorus mg/dL  --   --  3.6       Vancomycin Administrations:  vancomycin given in the last 96 hours                     vancomycin (VANCOCIN) 1,000 mg in 0.9% NaCl 250 mL IVPB (admixture device) (mg) 1,000 mg New Bag 11/25/24 1240                    Microbiologic Results:  Microbiology Results (last 7 days)       Procedure Component Value Units Date/Time    Blood culture x two cultures. Draw prior to antibiotics. [[id number]] Collected: 11/25/24 1219    Order Status: Completed Specimen: Blood from Peripheral, Antecubital, Right Updated: 11/26/24 0254     Blood Culture, Routine Gram stain sumi bottle: Gram negative rods      Results called to and read back by: CRISTINE Savage      11/26/2024 02:51 BML    Narrative:      Aerobic and anaerobic    Blood culture x two cultures. Draw prior to antibiotics. [[id number]] Collected: 11/25/24 1213    Order Status: Completed Specimen: Blood from Peripheral, Hand, Right Updated: 11/25/24 2112     Blood Culture, Routine No Growth to date    Narrative:      Aerobic and anaerobic    Urine culture [[id number]] Collected: 11/25/24 1320    Order Status: No result Specimen: Urine Updated: 11/25/24 6891

## 2024-11-26 NOTE — ASSESSMENT & PLAN NOTE
ESRD - dialysis twice weekly - Tuesday and Saturdays    Plan/Recommendation  -HD Saturday if still inpatient  -continue home torsemide 100 QD  -follow up RFP daily  -Keep MAP > 65  -Keep hemoglobin > 7  -Strict ins and outs  -Avoid nephrotoxic agents if possible and renally dose medications  -Avoid drastic hemodynamic changes if possible    #MPO positive ANCA vasculitis  Continue home pred       #Anemia  Hemoglobin   Date Value Ref Range Status   11/26/2024 9.3 (L) 12.0 - 16.0 g/dL Final     Iron   Date Value Ref Range Status   10/04/2024 59 30 - 160 ug/dL Final     Transferrin   Date Value Ref Range Status   10/04/2024 209 200 - 375 mg/dL Final     TIBC   Date Value Ref Range Status   10/04/2024 309 250 - 450 ug/dL Final     Saturated Iron   Date Value Ref Range Status   10/04/2024 19 (L) 20 - 50 % Final     Ferritin   Date Value Ref Range Status   10/04/2024 1,958 (H) 20.0 - 300.0 ng/mL Final   Hold iron replacement while awaiting sepsis workup  MAXIM weekly    #Secondary hyperparathyroidism  Calcium   Date Value Ref Range Status   11/26/2024 8.7 8.7 - 10.5 mg/dL Final     Phosphorus   Date Value Ref Range Status   11/26/2024 3.6 2.7 - 4.5 mg/dL Final     Obtain PTH

## 2024-11-26 NOTE — ASSESSMENT & PLAN NOTE
Pt is Full Code, has Living Will and would not want long term life support  Daughter aware    ACP time spent 5 minute
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
## 2024-11-26 NOTE — NURSING
Patient arrived to floor via transporter tech from ED. Patient transferred to bed via independently. AAOX4. Patient was oriented to room, information on communication board, and medication regimen. Bed low adequate lighting provided, side rails x2 up, call bell in reach. Admission assessment completed. Tele monitor on and monitor tech notified. Vitals per chart. Patient denied having any acute distress at this time. None observed.    [facility] [place]  Nurses Note -- 4 Eyes      11/25/2024       Skin assessed on: Q Shift      [x] No Pressure Injuries Present    [x]Prevention Measures Documented    [] Yes LDA  for Pressure Injury Previously documented     [] Yes New Pressure Injury Discovered   [] LDA for New Pressure Injury Added      Attending RN:  Janette London RN     Second RN:  CRISTINE rCuz

## 2024-11-26 NOTE — ASSESSMENT & PLAN NOTE
Undergoes rituximab maintenance w/ Dr Palacio q 6 mo  Is on atovaquone for PJP prophylaxis presumably  Relatively immune compromised

## 2024-11-26 NOTE — HPI
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 She had a fever to 102.3 in the ED and was admitted to  for sepsis workup.     Outpatient ESRD  Hemodialysis  Outpatient nephrologist: Dr. Harper  Outpatient center: [facility]  Dialysis schedule: Tuesdays/saturdays  Last treatment: 11/23  Anuric: makes urine - on torsemide 100 QD  Dry weight: 63.4  Access: Left AVF

## 2024-11-26 NOTE — NURSING
Sputum culture and respiratory panel swab ordered. Patient taken to dialysis via bed. Patient back on floor from dialysis. Patient did not complain of any pain throughout shift. Bed in lowest position, wheels locked, call bell in reach, side rails up x2.

## 2024-11-26 NOTE — NURSING
Notified by lab of positive blood cultures. Pt currently getting cefepime q24h. Notified Dr. Keane. No new orders given.

## 2024-11-26 NOTE — ASSESSMENT & PLAN NOTE
Sees Dr Mills routinely  TTE 1/2023:  Normal systolic function.  The estimated ejection fraction is 60%.  Grade II left ventricular diastolic dysfunction.  Small circumferential pericardial effusion.  Moderate to severe left atrial enlargement.  Mild mitral regurgitation.  Mild to moderate tricuspid regurgitation.  Normal right ventricular size with normal right ventricular systolic function.  The quantitatively derived ejection fraction is 56%.  Normal central venous pressure (3 mmHg).  The estimated PA systolic pressure is 36 mmHg.  No obvious vegitations.  Repeat echo pending

## 2024-11-26 NOTE — SUBJECTIVE & OBJECTIVE
Interval History:  Leukocytosis improving.  T-max 101.6° F    Review of Systems   Constitutional: Negative.    Respiratory:  Positive for cough.    Cardiovascular: Negative.    Gastrointestinal: Negative.    Genitourinary: Negative.    Musculoskeletal: Negative.    Neurological:  Positive for weakness.     Objective:     Vital Signs (Most Recent):  Temp: 97.8 °F (36.6 °C) (11/26/24 1129)  Pulse: 104 (11/26/24 1146)  Resp: 18 (11/26/24 1129)  BP: 129/62 (11/26/24 1129)  SpO2: 96 % (11/26/24 1129) Vital Signs (24h Range):  Temp:  [97.8 °F (36.6 °C)-101.6 °F (38.7 °C)] 97.8 °F (36.6 °C)  Pulse:  [] 104  Resp:  [16-20] 18  SpO2:  [95 %-99 %] 96 %  BP: (109-146)/(54-82) 129/62     Weight: 64 kg (141 lb 1.5 oz)  Body mass index is 26.66 kg/m².    Intake/Output Summary (Last 24 hours) at 11/26/2024 1341  Last data filed at 11/26/2024 1202  Gross per 24 hour   Intake 360 ml   Output --   Net 360 ml         Physical Exam  Constitutional:       General: She is not in acute distress.  Cardiovascular:      Rate and Rhythm: Normal rate.      Pulses: Normal pulses.   Pulmonary:      Effort: No respiratory distress.      Breath sounds: Normal breath sounds. No wheezing.   Abdominal:      General: Bowel sounds are normal. There is no distension.      Palpations: Abdomen is soft.      Tenderness: There is no abdominal tenderness.   Musculoskeletal:      Right lower leg: No edema.      Left lower leg: No edema.   Skin:     General: Skin is warm.   Neurological:      Mental Status: She is alert and oriented to person, place, and time. Mental status is at baseline.             Significant Labs: All pertinent labs within the past 24 hours have been reviewed.    Significant Imaging: I have reviewed all pertinent imaging results/findings within the past 24 hours.

## 2024-11-26 NOTE — PLAN OF CARE
Case Management Assessment     PCP:   Pharmacy: [facility] [place]    Patient Arrived From: Home   Existing Help at Home: Roro- daughter    Barriers to Discharge: None    Discharge Plan:    A. Home with family   B. Other- tbd    Pt is independent and uses a straight cane. Pt receives dialysis with [facility] on Tuesday and Saturday at 10:40 am.Pt's daughter will provide transportation home when discharged.        11/26/24 1238   Discharge Assessment   Assessment Type Discharge Planning Assessment   Confirmed/corrected address, phone number and insurance Yes   Confirmed Demographics Correct on Facesheet   Source of Information patient   When was your last doctors appointment? 11/11/24   Communicated ANTHONY with patient/caregiver Yes   Reason For Admission Severe sepsis   People in Home other relative(s)   Facility Arrived From: Home   Do you expect to return to your current living situation? Yes   Do you have help at home or someone to help you manage your care at home? Yes   Who are your caregiver(s) and their phone number(s)? Roro- daughter [phone]   Prior to hospitilization cognitive status: Alert/Oriented   Current cognitive status: Alert/Oriented   Walking or Climbing Stairs Difficulty yes   Walking or Climbing Stairs ambulation difficulty, requires equipment   Mobility Management straight cane   Dressing/Bathing Difficulty no   Equipment Currently Used at Home cane, straight   Readmission within 30 days? No   Patient currently being followed by outpatient case management? No   Do you currently have service(s) that help you manage your care at home? No   Do you take prescription medications? Yes   Do you have prescription coverage? Yes   Coverage [facility] SECURE [employer]   Do you have any problems affording any of your prescribed medications? No   Is the patient taking medications as prescribed? yes   Who is going to help you get home at discharge? FAMILY   How do you  get to doctors appointments? family or friend will provide   Are you on dialysis? Yes   Dialysis Name and Scheduled days Tuesday and Saturday [facility]   Do you take coumadin? No   Discharge Plan A Home with family   Discharge Plan B Other  (tbd)   DME Needed Upon Discharge  other (see comments)  (tbd)   Discharge Plan discussed with: Patient   Transition of Care Barriers None   SDOH   (none)   OTHER   Name(s) of People in Home Roro- daughter

## 2024-11-26 NOTE — NURSING
RAPID RESPONSE NURSE PROACTIVE ROUNDING NOTE       Time of Visit: 0009    Admit Date: 2024  LOS: 1  Code Status: Full Code   Date of Visit: 2024  : 1945  Age: 79 y.o.  Sex: female  Race: Black or   [facility]   MRN: [id number]  Was the patient discharged from an ICU this admission? No   Was the patient discharged from a PACU within last 24 hours? No   Did the patient receive conscious sedation/general anesthesia in last 24 hours? No   Was the patient in the ED within the past 24 hours? Yes   Was the patient on NIPPV within the past 24 hours? No   Attending Physician: Jc Godinez MD  Primary Service: Hospitalist,Internal Medicine   Time spent at the bedside: < 15 min    SITUATION    Notified by Epic patient alert  Reason for alert: MEWS 3, T 101.6    Diagnosis: Severe sepsis   has a past medical history of Acute blood loss anemia, Acute hypoxemic respiratory failure, Allergy, Anticoagulant long-term use, Back pain, Chronic diastolic heart failure, Chronic diastolic heart failure, Colon polyp, Disorder of kidney and ureter, Encounter for blood transfusion, H/O Bell's palsy, Helicobacter pylori (H. pylori), HTN (hypertension), Hypothyroid, OA (osteoarthritis), Occlusion of vein, DONAVAN (obstructive sleep apnea), Pneumonia due to other staphylococcus, Pulmonary HTN, Sepsis due to pneumonia, Septic shock, Severe sepsis, Trouble in sleeping, Urinary incontinence, and Vasculitis, ANCA positive.    Last Vitals:  Temp: 101.6 °F (38.7 °C) (18)  Pulse: 99 (3)  Resp: 16 (3)  BP: 127/69 (3)  SpO2: 97 % (3)    24 Hour Vitals Range:  Temp:  [98.4 °F (36.9 °C)-102.3 °F (39.1 °C)]   Pulse:  []   Resp:  [16-27]   BP: ()/(53-82)   SpO2:  [94 %-99 %]     Clinical Issues:  temp 101.6    ASSESSMENT/INTERVENTIONS    Pt may have tylenol    RECOMMENDATIONS  Give PRN tylenol    Discussed plan of care with bedside Janette COLUNGA  ESCALATION    Physician escalation: No    Orders received and case discussed with NA.    Disposition:Remain in room 431    FOLLOW UP    Call back the Rapid Response NurseEneida at [phone] for additional questions or concerns.

## 2024-11-26 NOTE — PROGRESS NOTES
[facility] Medicine  Progress Note    Patient Name: Kristin Goodman  MRN: [id number]  Patient Class: IP- Inpatient   Admission Date: 11/25/2024  Length of Stay: 1 days  Attending Physician: Sophie Garner,*  Primary Care Provider: Josy Keane PA-C        Subjective:     Principal Problem:Severe sepsis        HPI:  80 yo w/ ANCA+ microscopic polyangitis dx 2 yrs ago, w/ associated GN causing ESRD, on HD x 2 yrs but non oliguric  She seen rheum and gets rituximab every 6 mo, just had her infusion last week.  She goes to HD every T/Th/Sat and tolerates it well.  She also has HTN, hypothyroidism, h/o left brachiocephalic vein occlusion (I believe this is why she takes Eliquis?), DJD, VHD, dyslipidemia.  Despite all this she generally feels pretty good and is active - After HD Sat she felt sluggish which is not unusual, but woke up Sunday feeling like she had a cold w/ cough productive of yellow sputum and congestion. She went to be and got up this AM feeling more sluggish and had a temp of 101.4, daughter brought her to ED, temp 102.3, sepsis alert activated, no bolus d/t ESRD, CXR w/o acute disease, urine w/ WBC and RBC but no bacteria, source of infection uncertain. She has had ESBL + e coli UTI and enterococcal bacteremia in the past but it's been awhile.  SARS CoV2 negative, PCT 2.1  Given vanc and cefepime.  Nephrology aware of admit, will likely need HD tomorrow.  BCX done in ED.  HM to admit.  Pt currently lying on stretcher, feeling better, daughter at bedside.      Overview/Hospital Course:  80 yo w/ ANCA+ microscopic polyangitis dx 2 yrs ago, w/ associated GN on rituximab every 6 months, (last infusion last week) ESRD, on HD TTS x 2 yrs , non oliguric, HTN, hypothyroidism, h/o left brachiocephalic vein occlusion on Eliquis who was admitted for sepsis secondary to ?  Unclear etiology. ?  Concern for pneumonia given fever/cough/congestion on presentation Chest x-ray with  no acute consolidation Respiratory cultures/RIP pending tested negative for COVID/flu.  Procalcitonin 2.09 Blood cultures with E coli.  UA revealed > 100 WBC with no bacteria.  Nephrology consulted for inpatient HD needs.  Consult ID for assistance    Interval History:  Leukocytosis improving.  T-max 101.6° F    Review of Systems   Constitutional: Negative.    Respiratory:  Positive for cough.    Cardiovascular: Negative.    Gastrointestinal: Negative.    Genitourinary: Negative.    Musculoskeletal: Negative.    Neurological:  Positive for weakness.     Objective:     Vital Signs (Most Recent):  Temp: 97.8 °F (36.6 °C) (11/26/24 1129)  Pulse: 104 (11/26/24 1146)  Resp: 18 (11/26/24 1129)  BP: 129/62 (11/26/24 1129)  SpO2: 96 % (11/26/24 1129) Vital Signs (24h Range):  Temp:  [97.8 °F (36.6 °C)-101.6 °F (38.7 °C)] 97.8 °F (36.6 °C)  Pulse:  [] 104  Resp:  [16-20] 18  SpO2:  [95 %-99 %] 96 %  BP: (109-146)/(54-82) 129/62     Weight: 64 kg (141 lb 1.5 oz)  Body mass index is 26.66 kg/m².    Intake/Output Summary (Last 24 hours) at 11/26/2024 1341  Last data filed at 11/26/2024 1202  Gross per 24 hour   Intake 360 ml   Output --   Net 360 ml         Physical Exam  Constitutional:       General: She is not in acute distress.  Cardiovascular:      Rate and Rhythm: Normal rate.      Pulses: Normal pulses.   Pulmonary:      Effort: No respiratory distress.      Breath sounds: Normal breath sounds. No wheezing.   Abdominal:      General: Bowel sounds are normal. There is no distension.      Palpations: Abdomen is soft.      Tenderness: There is no abdominal tenderness.   Musculoskeletal:      Right lower leg: No edema.      Left lower leg: No edema.   Skin:     General: Skin is warm.   Neurological:      Mental Status: She is alert and oriented to person, place, and time. Mental status is at baseline.             Significant Labs: All pertinent labs within the past 24 hours have been reviewed.    Significant Imaging: I  have reviewed all pertinent imaging results/findings within the past 24 hours.      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  UA with >  100 WBC, no bacteria  Procalcitonin 2 .09 Chest x-ray with no evidence of focal consolidation  On vanc/cefepime  Leukocytosis improved.  Obtain CT abdomen  Consult ID    Advance care planning  Pt is Full Code, has Living Will and would not want long term life support  Daughter aware    ACP time spent 5 minute    Valvular heart disease  Sees Dr Mills routinely  TTE 1/2023:  Normal systolic function.  The estimated ejection fraction is 60%.  Grade II left ventricular diastolic dysfunction.  Small circumferential pericardial effusion.  Moderate to severe left atrial enlargement.  Mild mitral regurgitation.  Mild to moderate tricuspid regurgitation.  Normal right ventricular size with normal right ventricular systolic function.  The quantitatively derived ejection fraction is 56%.  Normal central venous pressure (3 mmHg).  The estimated PA systolic pressure is 36 mmHg.  No obvious vegitations.  Repeat echo pending    ESRD (end stage renal disease)  Consulted nephrology for HD  Pt states she goes every T/Th/Sat and has no problems tolerating it other than feeling a bit sluggish afterwards      Current long-term use of anticoagulant medication with history of deep venous thrombosis (DVT)  Continue Eliquis for prophylaxis    Anemia due to chronic kidney disease  Hemoglobin Appears stable      Antineutrophilic cytoplasmic antibody (ANCA) vasculitis  Undergoes rituximab maintenance w/ Dr Palacio q 6 mo  Is on atovaquone for PJP prophylaxis presumably  Relatively immune compromised       Chronic diastolic heart failure    Echo pending.  Appears euvolemic  Sees Dr Mills routinely        Primary hypertension  Blood pressure at goal.  Holding home  antihypertensives in the setting of sepsis    Hypothyroid  TSH 0.6  Continue levothyroxine 25 mcg QD          VTE Risk Mitigation (From admission, onward)           Ordered     apixaban tablet 5 mg  2 times daily         11/25/24 1731     Reason for No Pharmacological VTE Prophylaxis  Once        Question:  Reasons:  Answer:  Risk of Bleeding    11/25/24 1558     IP VTE HIGH RISK PATIENT  Once         11/25/24 1558     Place sequential compression device  Until discontinued         11/25/24 1558                    Discharge Planning   ANTHONY: 11/28/2024     Code Status: Full Code   Is the patient medically ready for discharge?:     Reason for patient still in hospital (select all that apply): Patient trending condition and Treatment  Discharge Plan A: Home with family                  Sophie Garner MD  Department of Hospital Medicine   [facility] [place] [facility]

## 2024-11-27 PROBLEM — I38 VALVULAR HEART DISEASE: Status: RESOLVED | Noted: 2024-11-25 | Resolved: 2024-11-27

## 2024-11-27 LAB
ALBUMIN SERPL BCP-MCNC: 2.9 G/DL (ref 3.5–5.2)
ANION GAP SERPL CALC-SCNC: 11 MMOL/L (ref 8–16)
ASCENDING AORTA: 2.26 CM
AV INDEX (PROSTH): 0.8
AV MEAN GRADIENT: 7.9 MMHG
AV PEAK GRADIENT: 14.4 MMHG
AV VALVE AREA BY VELOCITY RATIO: 2.3 CM²
AV VALVE AREA: 2.5 CM²
AV VELOCITY RATIO: 0.74
BACTERIA BLD CULT: ABNORMAL
BASOPHILS # BLD AUTO: 0.04 K/UL (ref 0–0.2)
BASOPHILS NFR BLD: 0.4 % (ref 0–1.9)
BSA FOR ECHO PROCEDURE: 1.66 M2
BUN SERPL-MCNC: 24 MG/DL (ref 8–23)
CALCIUM SERPL-MCNC: 9.3 MG/DL (ref 8.7–10.5)
CHLORIDE SERPL-SCNC: 102 MMOL/L (ref 95–110)
CO2 SERPL-SCNC: 26 MMOL/L (ref 23–29)
CREAT SERPL-MCNC: 3.9 MG/DL (ref 0.5–1.4)
CV ECHO LV RWT: 0.5 CM
DIFFERENTIAL METHOD BLD: ABNORMAL
DOP CALC AO PEAK VEL: 1.9 M/S
DOP CALC AO VTI: 35.3 CM
DOP CALC LVOT AREA: 3.1 CM2
DOP CALC LVOT DIAMETER: 2 CM
DOP CALC LVOT PEAK VEL: 1.4 M/S
DOP CALC LVOT STROKE VOLUME: 88.5 CM3
DOP CALC MV VTI: 28.9 CM
DOP CALCLVOT PEAK VEL VTI: 28.2 CM
E WAVE DECELERATION TIME: 143.14 MSEC
E/A RATIO: 0.69
E/E' RATIO: 11.44 M/S
ECHO LV POSTERIOR WALL: 1 CM (ref 0.6–1.1)
EOSINOPHIL # BLD AUTO: 0.3 K/UL (ref 0–0.5)
EOSINOPHIL NFR BLD: 2.9 % (ref 0–8)
ERYTHROCYTE [DISTWIDTH] IN BLOOD BY AUTOMATED COUNT: 14.5 % (ref 11.5–14.5)
EST. GFR  (NO RACE VARIABLE): 11 ML/MIN/1.73 M^2
FRACTIONAL SHORTENING: 37.5 % (ref 28–44)
GLUCOSE SERPL-MCNC: 104 MG/DL (ref 70–110)
HCT VFR BLD AUTO: 32.2 % (ref 37–48.5)
HGB BLD-MCNC: 10 G/DL (ref 12–16)
IMM GRANULOCYTES # BLD AUTO: 0.05 K/UL (ref 0–0.04)
IMM GRANULOCYTES NFR BLD AUTO: 0.5 % (ref 0–0.5)
INTERVENTRICULAR SEPTUM: 0.8 CM (ref 0.6–1.1)
IVC DIAMETER: 1.22 CM
IVRT: 98.95 MSEC
LA MAJOR: 4.73 CM
LA MINOR: 4.85 CM
LA WIDTH: 3.8 CM
LEFT ATRIUM SIZE: 3.91 CM
LEFT ATRIUM VOLUME INDEX: 37.1 ML/M2
LEFT ATRIUM VOLUME: 60.48 CM3
LEFT INTERNAL DIMENSION IN SYSTOLE: 2.5 CM (ref 2.1–4)
LEFT VENTRICLE DIASTOLIC VOLUME INDEX: 43.07 ML/M2
LEFT VENTRICLE DIASTOLIC VOLUME: 70.21 ML
LEFT VENTRICLE MASS INDEX: 67.3 G/M2
LEFT VENTRICLE SYSTOLIC VOLUME INDEX: 13.6 ML/M2
LEFT VENTRICLE SYSTOLIC VOLUME: 22.23 ML
LEFT VENTRICULAR INTERNAL DIMENSION IN DIASTOLE: 4 CM (ref 3.5–6)
LEFT VENTRICULAR MASS: 109.7 G
LV LATERAL E/E' RATIO: 10.3 M/S
LV SEPTAL E/E' RATIO: 12.88 M/S
LVED V (TEICH): 70.21 ML
LVES V (TEICH): 22.23 ML
LVOT MG: 4.32 MMHG
LVOT MV: 1 CM/S
LYMPHOCYTES # BLD AUTO: 2 K/UL (ref 1–4.8)
LYMPHOCYTES NFR BLD: 19.8 % (ref 18–48)
MCH RBC QN AUTO: 29.3 PG (ref 27–31)
MCHC RBC AUTO-ENTMCNC: 31.1 G/DL (ref 32–36)
MCV RBC AUTO: 94 FL (ref 82–98)
MONOCYTES # BLD AUTO: 1.9 K/UL (ref 0.3–1)
MONOCYTES NFR BLD: 18.7 % (ref 4–15)
MV MEAN GRADIENT: 4 MMHG
MV PEAK A VEL: 1.49 M/S
MV PEAK E VEL: 1.03 M/S
MV PEAK GRADIENT: 9 MMHG
MV STENOSIS PRESSURE HALF TIME: 41.51 MS
MV VALVE AREA BY CONTINUITY EQUATION: 3.06 CM2
MV VALVE AREA P 1/2 METHOD: 5.3 CM2
NEUTROPHILS # BLD AUTO: 5.8 K/UL (ref 1.8–7.7)
NEUTROPHILS NFR BLD: 57.7 % (ref 38–73)
NRBC BLD-RTO: 0 /100 WBC
OHS CV RV/LV RATIO: 0.88 CM
PHOSPHATE SERPL-MCNC: 3.2 MG/DL (ref 2.7–4.5)
PISA TR MAX VEL: 3.01 M/S
PLATELET # BLD AUTO: 237 K/UL (ref 150–450)
PMV BLD AUTO: 10 FL (ref 9.2–12.9)
POTASSIUM SERPL-SCNC: 3.6 MMOL/L (ref 3.5–5.1)
PV PEAK GRADIENT: 5 MMHG
PV PEAK VELOCITY: 1.17 M/S
RA MAJOR: 3.93 CM
RA PRESSURE ESTIMATED: 8 MMHG
RA WIDTH: 3.1 CM
RBC # BLD AUTO: 3.41 M/UL (ref 4–5.4)
RIGHT VENTRICLE DIASTOLIC BASEL DIMENSION: 3.5 CM
RIGHT VENTRICULAR END-DIASTOLIC DIMENSION: 3.47 CM
RV TB RVSP: 11 MMHG
RV TISSUE DOPPLER FREE WALL SYSTOLIC VELOCITY 1 (APICAL 4 CHAMBER VIEW): 19.92 CM/S
SINUS: 2.36 CM
SODIUM SERPL-SCNC: 139 MMOL/L (ref 136–145)
STJ: 1.46 CM
TDI LATERAL: 0.1 M/S
TDI SEPTAL: 0.08 M/S
TDI: 0.09 M/S
TR MAX PG: 36 MMHG
TRICUSPID ANNULAR PLANE SYSTOLIC EXCURSION: 2.05 CM
TV REST PULMONARY ARTERY PRESSURE: 44 MMHG
VANCOMYCIN SERPL-MCNC: 18.3 UG/ML
WBC # BLD AUTO: 10.01 K/UL (ref 3.9–12.7)
Z-SCORE OF LEFT VENTRICULAR DIMENSION IN END DIASTOLE: -1.39
Z-SCORE OF LEFT VENTRICULAR DIMENSION IN END SYSTOLE: -1.04

## 2024-11-27 PROCEDURE — 80069 RENAL FUNCTION PANEL: CPT | Performed by: STUDENT IN AN ORGANIZED HEALTH CARE EDUCATION/TRAINING PROGRAM

## 2024-11-27 PROCEDURE — 85025 COMPLETE CBC W/AUTO DIFF WBC: CPT | Performed by: HOSPITALIST

## 2024-11-27 PROCEDURE — 36415 COLL VENOUS BLD VENIPUNCTURE: CPT | Performed by: STUDENT IN AN ORGANIZED HEALTH CARE EDUCATION/TRAINING PROGRAM

## 2024-11-27 PROCEDURE — 99232 SBSQ HOSP IP/OBS MODERATE 35: CPT | Mod: FS,,, | Performed by: STUDENT IN AN ORGANIZED HEALTH CARE EDUCATION/TRAINING PROGRAM

## 2024-11-27 PROCEDURE — 25000003 PHARM REV CODE 250: Performed by: STUDENT IN AN ORGANIZED HEALTH CARE EDUCATION/TRAINING PROGRAM

## 2024-11-27 PROCEDURE — 80202 ASSAY OF VANCOMYCIN: CPT | Performed by: HOSPITALIST

## 2024-11-27 PROCEDURE — 99223 1ST HOSP IP/OBS HIGH 75: CPT | Mod: ,,, | Performed by: NURSE PRACTITIONER

## 2024-11-27 PROCEDURE — 21400001 HC TELEMETRY ROOM

## 2024-11-27 PROCEDURE — 63600175 PHARM REV CODE 636 W HCPCS: Performed by: HOSPITALIST

## 2024-11-27 PROCEDURE — 25000003 PHARM REV CODE 250: Performed by: HOSPITALIST

## 2024-11-27 RX ORDER — GUAIFENESIN 100 MG/5ML
400 SOLUTION ORAL EVERY 4 HOURS PRN
Status: DISCONTINUED | OUTPATIENT
Start: 2024-11-27 | End: 2024-12-01 | Stop reason: HOSPADM

## 2024-11-27 RX ADMIN — SEVELAMER CARBONATE 800 MG: 800 TABLET, FILM COATED ORAL at 04:11

## 2024-11-27 RX ADMIN — ACETAMINOPHEN 650 MG: 325 TABLET ORAL at 12:11

## 2024-11-27 RX ADMIN — FLUTICASONE PROPIONATE 100 MCG: 50 SPRAY, METERED NASAL at 10:11

## 2024-11-27 RX ADMIN — MUPIROCIN: 20 OINTMENT TOPICAL at 09:11

## 2024-11-27 RX ADMIN — CARBAMIDE PEROXIDE 6.5% 5 DROP: 6.5 LIQUID AURICULAR (OTIC) at 09:11

## 2024-11-27 RX ADMIN — SEVELAMER CARBONATE 800 MG: 800 TABLET, FILM COATED ORAL at 11:11

## 2024-11-27 RX ADMIN — ATOVAQUONE 1500 MG: 750 SUSPENSION ORAL at 10:11

## 2024-11-27 RX ADMIN — APIXABAN 5 MG: 5 TABLET, FILM COATED ORAL at 10:11

## 2024-11-27 RX ADMIN — MUPIROCIN: 20 OINTMENT TOPICAL at 10:11

## 2024-11-27 RX ADMIN — LEVOTHYROXINE SODIUM 25 MCG: 25 TABLET ORAL at 05:11

## 2024-11-27 RX ADMIN — GUAIFENESIN AND DEXTROMETHORPHAN HYDROBROMIDE 1 TABLET: 600; 30 TABLET, EXTENDED RELEASE ORAL at 09:11

## 2024-11-27 RX ADMIN — GUAIFENESIN 400 MG: 200 SOLUTION ORAL at 06:11

## 2024-11-27 RX ADMIN — CARBAMIDE PEROXIDE 6.5% 5 DROP: 6.5 LIQUID AURICULAR (OTIC) at 10:11

## 2024-11-27 RX ADMIN — CEFEPIME 1 G: 1 INJECTION, POWDER, FOR SOLUTION INTRAMUSCULAR; INTRAVENOUS at 11:11

## 2024-11-27 RX ADMIN — PANTOPRAZOLE SODIUM 40 MG: 40 TABLET, DELAYED RELEASE ORAL at 10:11

## 2024-11-27 RX ADMIN — APIXABAN 5 MG: 5 TABLET, FILM COATED ORAL at 09:11

## 2024-11-27 NOTE — PLAN OF CARE
Problem: Adult Inpatient Plan of Care  Goal: Plan of Care Review  Outcome: Progressing  Goal: Patient-Specific Goal (Individualized)  Outcome: Progressing  Goal: Absence of Hospital-Acquired Illness or Injury  Outcome: Progressing  Goal: Optimal Comfort and Wellbeing  Outcome: Progressing  Goal: Readiness for Transition of Care  Outcome: Progressing     Problem: Sepsis/Septic Shock  Goal: Optimal Coping  Outcome: Progressing  Goal: Absence of Bleeding  Outcome: Progressing  Goal: Blood Glucose Level Within Targeted Range  Outcome: Progressing  Goal: Absence of Infection Signs and Symptoms  Outcome: Progressing  Goal: Optimal Nutrition Intake  Outcome: Progressing     Problem: Infection  Goal: Absence of Infection Signs and Symptoms  Outcome: Progressing     Problem: Wound  Goal: Optimal Coping  Outcome: Progressing  Goal: Optimal Functional Ability  Outcome: Progressing  Goal: Absence of Infection Signs and Symptoms  Outcome: Progressing  Goal: Improved Oral Intake  Outcome: Progressing  Goal: Optimal Pain Control and Function  Outcome: Progressing  Goal: Skin Health and Integrity  Outcome: Progressing  Goal: Optimal Wound Healing  Outcome: Progressing     Problem: Hemodialysis  Goal: Safe, Effective Therapy Delivery  Outcome: Progressing  Goal: Effective Tissue Perfusion  Outcome: Progressing  Goal: Absence of Infection Signs and Symptoms  Outcome: Progressing

## 2024-11-27 NOTE — CONSULTS
[facility] Gastroenterology Consultation Note    Reason for consult: posterior rectal mass on CT, recent C scope 10/24.    PCP:   Josy Keane       LOS: 2        Initial History of Present Illness (HPI):  This is a 79 y.o. female consulted to GI service for posterior rectal mass on CT, recent C scope 10/24. PMH ANCA+ microscopic polyangitis dx 2 yrs ago, w/ associated GN causing ESRD, on HD x 2 yrs but non oliguric. She seen rheum and gets rituximab every 6 mo, just had her infusion last week.  She goes to HD every T/Th/Sat and tolerates it well.  She also has HTN, hypothyroidism, h/o left brachiocephalic vein occlusion (I believe this is why she takes Eliquis?), DJD, VHD, dyslipidemia.   Patient admitted for sepsis and is uroseptic. Denies complaints of n/v, abdominal pain, BRBPR, melena, diarrhea or constipaiton.       Imaging - posterior rectal mass on CT      Medical History:  has a past medical history of Acute blood loss anemia (10/17/2022), Acute hypoxemic respiratory failure (10/23/2022), Allergy, Anticoagulant long-term use, Back pain, Chronic diastolic heart failure (08/31/2020), Chronic diastolic heart failure (08/31/2020), Colon polyp, Disorder of kidney and ureter, Encounter for blood transfusion, H/O Bell's palsy (2006), Helicobacter pylori (H. pylori), HTN (hypertension), Hypothyroid, OA (osteoarthritis), Occlusion of vein, DONAVAN (obstructive sleep apnea) (11/09/2020), Pneumonia due to other staphylococcus, Pulmonary HTN (08/31/2020), Sepsis due to pneumonia (10/17/2022), Septic shock (10/27/2022), Severe sepsis (11/25/2024), Trouble in sleeping, Urinary incontinence, and Vasculitis, ANCA positive.    Surgical History:  has a past surgical history that includes Total abdominal hysterectomy; Oophorectomy; Colonoscopy (N/A, 09/28/2020); Knee arthroscopy w/ meniscectomy (Right, 12/21/2021); Arthroscopic chondroplasty of knee joint (Right, 12/21/2021); Synovectomy of knee (Right,  2021); Esophagogastroduodenoscopy (N/A, 2022); Placement of arteriovenous graft (Left, 2023); Esophagogastroduodenoscopy (N/A, 10/17/2024); Colonoscopy (N/A, 10/17/2024); and AV fistula placement (Left).      Objective Findings:    Vital Signs:  Temp:  [97.8 °F (36.6 °C)-101 °F (38.3 °C)]   Pulse:  []   Resp:  [16-20]   BP: (109-137)/(56-73)   SpO2:  [94 %-98 %]   Body mass index is 26.66 kg/m².      Physical Exam  Vitals and nursing note reviewed.   Constitutional:       Appearance: Normal appearance.   HENT:      Head: Normocephalic.   Pulmonary:      Effort: Pulmonary effort is normal.   Abdominal:      General: Bowel sounds are normal.      Palpations: Abdomen is soft.   Skin:     General: Skin is warm and dry.   Neurological:      Mental Status: She is alert and oriented to person, place, and time.   Psychiatric:         Mood and Affect: Mood normal.         Behavior: Behavior normal.         Thought Content: Thought content normal.         Judgment: Judgment normal.               Labs:  Lab Results   Component Value Date    WBC 10.01 2024    HGB 10.0 (L) 2024    HCT 32.2 (L) 2024     2024    CHOL 168 2024    TRIG 181 (H) 2024    HDL 44 2024    ALT 9 (L) 2024    AST 17 2024     2024    K 3.6 2024     2024    CREATININE 3.9 (H) 2024    BUN 24 (H) 2024    CO2 26 2024    TSH 0.632 2024    INR 1.0 2023    HGBA1C 4.7 2024             Imagin/26 CT abdomen pelvis without contrast- Small left pleural effusion.     Coronary artery calcifications.     Small liver lesions most likely cysts.     Stable left adrenal adenoma.     Diverticulosis but no diverticulitis.     Possible posterior rectal mass      Endoscopy: 10/17/24 EGD- No gross lesions in the entire esophagus.                          - Erythematous mucosa in the antrum. Biopsied.                          -  Non-bleeding duodenal ulcers with a clean ulcer                          base (Hai Class III).   10/27/24 Colonoscopy-  One 2 mm polyp at the ileocecal valve, removed                          with a jumbo cold forceps. Resected and retrieved.                          - One 4 mm polyp in the ascending colon, removed                          with a cold snare. Resected and retrieved.                          - Three 3 to 5 mm polyps in the transverse colon,                          removed with a cold snare. Resected and retrieved.                          - Five 3 to 5 mm polyps in the descending colon,                          removed with a cold snare. Resected and retrieved.                Abnormal Ct abdomen. Rectal mass.  Plan/ Recommendations:  1.  Patient without any GI complaints this hospitalization with recent dual endoscopy in Oct 2024 with findings of gastric ulcer and colonic polyps. Path on polyps are tubular adenoma. No visualization of any mass in colon at time of endoscopy. Plan for repeat outpatient colonoscopy-request sent.    Will sign off    Thank you so much for allowing us to participate in the care of Kristin Goodman . Please contact us if you have any additional questions.    Mariam Painter NP  Gastroenterology  [facility] - Med Surg

## 2024-11-27 NOTE — ASSESSMENT & PLAN NOTE
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  UA with >  100 WBC, no bacteria however urine culture with Gram-negative rods  Procalcitonin 2 .09 Chest x-ray with no evidence of focal consolidation  On vanc/cefepime.  DC vanc, continue cefepime while we await final cultures  Leukocytosis improved.  CT abdomen with possible 1.6 cm rectal mass.  Last colonoscopy 10/24.  Consulted GI for assistance  Obtain repeat blood cultures

## 2024-11-27 NOTE — NURSING
[facility] [place]  Nurses Note -- 4 Eyes      11/27/2024       Skin assessed on: Q Shift      [x] No Pressure Injuries Present    [x]Prevention Measures Documented    [] Yes LDA  for Pressure Injury Previously documented     [] Yes New Pressure Injury Discovered   [] LDA for New Pressure Injury Added      Attending RN:  Tomasa Kwon, RN     Second RN:  Bernadine Tamez LPN

## 2024-11-27 NOTE — PROGRESS NOTES
[facility]  Nephrology  Progress Note    Patient Name: Kristin Goodman  MRN: [id number]  Admission Date: 11/25/2024  Hospital Length of Stay: 2 days  Attending Provider: Sophie Garner,*   Primary Care Physician: Josy Keane PA-C  Principal Problem:Severe sepsis    Subjective:     HPI: 79 year old female with a history of microscopic polyangiitis (MPO positive) with mixed SLE picture on rituximab with rheumatology (Last dose 11/17/2024), on HD (Tuesdays and Saturdays), HTN presents with fevers/chills from home. She had a fever to 102.3 in the ED and was admitted to  for sepsis workup.     Outpatient ESRD  Hemodialysis  Outpatient nephrologist: Dr. Harper  Outpatient center: [facility]  Dialysis schedule: Tuesdays/saturdays  Last treatment: 11/23  Anuric: makes urine - on torsemide 100 QD  Dry weight: 63.4  Access: Left AVF      Interval History: UF 1L yesterday, tolerated well. UOP 3x over 24hrs. NAEON. Normal appetite, no complaints.     Review of patient's allergies indicates:   Allergen Reactions    Ampicillin     Lactose Diarrhea    Peaches [peach (prunus persica)] Other (See Comments)     Pt unable to state type of reaction. Information obtained from daughter who states she was informed of allergy from patient.    Penicillins      Other reaction(s): Hives, anaphylaxis    Sulfa (sulfonamide antibiotics) Rash and Hives     Current Facility-Administered Medications   Medication Frequency    acetaminophen tablet 650 mg Q4H PRN    acetaminophen tablet 650 mg Q8H PRN    albuterol sulfate nebulizer solution 2.5 mg Q6H PRN    apixaban tablet 5 mg BID    atovaquone 750 mg/5 mL oral liquid 1,500 mg Daily    carbamide peroxide 6.5 % otic solution 5 drop BID    ceFEPIme injection 1 g Q24H    dextrose 10% bolus 125 mL 125 mL PRN    dextrose 10% bolus 250 mL 250 mL PRN    epoetin hira-epbx injection 3,200 Units Q7 Days    fluticasone propionate 50 mcg/actuation nasal spray 100 mcg Daily     glucagon (human recombinant) injection 1 mg PRN    glucose chewable tablet 16 g PRN    glucose chewable tablet 24 g PRN    hydrALAZINE injection 10 mg Q4H PRN    levothyroxine tablet 25 mcg Before breakfast    melatonin tablet 6 mg Nightly PRN    mupirocin 2 % ointment BID    naloxone 0.4 mg/mL injection 0.02 mg PRN    pantoprazole EC tablet 40 mg Daily    prochlorperazine injection Soln 5 mg Q6H PRN    QUEtiapine tablet 25 mg Nightly PRN    sevelamer carbonate tablet 800 mg TID WM    simethicone chewable tablet 80 mg QID PRN    sodium chloride 0.9% flush 10 mL Q12H PRN    torsemide tablet 100 mg Daily       Objective:     Vital Signs (Most Recent):  Temp: 98.1 °F (36.7 °C) (11/27/24 1129)  Pulse: 93 (11/27/24 1129)  Resp: 18 (11/27/24 1129)  BP: 137/63 (11/27/24 1129)  SpO2: 97 % (11/27/24 1129) Vital Signs (24h Range):  Temp:  [98.1 °F (36.7 °C)-101 °F (38.3 °C)] 98.1 °F (36.7 °C)  Pulse:  [] 93  Resp:  [16-20] 18  SpO2:  [94 %-98 %] 97 %  BP: (109-137)/(56-73) 137/63     Weight: 64 kg (141 lb 1.5 oz) (11/25/24 1940)  Body mass index is 26.66 kg/m².  Body surface area is 1.66 meters squared.    I/O last 3 completed shifts:  In: 701.2 [P.O.:600; IV Piggyback:101.2]  Out: 1000 [Other:1000]     Physical Exam  Vitals and nursing note reviewed.   HENT:      Head: Normocephalic.      Nose: Nose normal.      Mouth/Throat:      Mouth: Mucous membranes are moist.   Eyes:      Extraocular Movements: Extraocular movements intact.      Conjunctiva/sclera: Conjunctivae normal.      Pupils: Pupils are equal, round, and reactive to light.   Cardiovascular:      Rate and Rhythm: Normal rate.   Pulmonary:      Effort: Pulmonary effort is normal.      Breath sounds: Normal breath sounds.   Abdominal:      Palpations: Abdomen is soft.   Musculoskeletal:      Cervical back: Normal range of motion.      Right lower leg: No edema.      Left lower leg: No edema.   Skin:     General: Skin is warm.   Neurological:      General:  No focal deficit present.      Mental Status: She is alert.   Psychiatric:         Mood and Affect: Mood normal.          Significant Labs:  CBC:   Recent Labs   Lab 11/27/24  0448   WBC 10.01   RBC 3.41*   HGB 10.0*   HCT 32.2*      MCV 94   MCH 29.3   MCHC 31.1*     CMP:   Recent Labs   Lab 11/25/24  1219 11/26/24  0404 11/27/24  0448   *   < > 104   CALCIUM 9.1   < > 9.3   ALBUMIN 3.2*  --  2.9*   PROT 6.9  --   --       < > 139   K 4.0   < > 3.6   CO2 27   < > 26      < > 102   BUN 49*   < > 24*   CREATININE 5.4*   < > 3.9*   ALKPHOS 68  --   --    ALT 9*  --   --    AST 17  --   --    BILITOT 0.4  --   --     < > = values in this interval not displayed.     All labs within the past 24 hours have been reviewed.     Significant Imaging:  Labs: Reviewed    Assessment/Plan:     Renal/  ESRD (end stage renal disease)  ESRD - dialysis twice weekly - Tuesday and Saturdays    Plan/Recommendation  -HD Saturday if still inpatient  -continue home torsemide 100 QD  -follow up RFP daily  -Keep MAP > 65  -Keep hemoglobin > 7  -Strict ins and outs  -Avoid nephrotoxic agents if possible and renally dose medications  -Avoid drastic hemodynamic changes if possible    #MPO positive ANCA vasculitis  Continue home pred       #Anemia  Hemoglobin   Date Value Ref Range Status   11/26/2024 9.3 (L) 12.0 - 16.0 g/dL Final     Iron   Date Value Ref Range Status   10/04/2024 59 30 - 160 ug/dL Final     Transferrin   Date Value Ref Range Status   10/04/2024 209 200 - 375 mg/dL Final     TIBC   Date Value Ref Range Status   10/04/2024 309 250 - 450 ug/dL Final     Saturated Iron   Date Value Ref Range Status   10/04/2024 19 (L) 20 - 50 % Final     Ferritin   Date Value Ref Range Status   10/04/2024 1,958 (H) 20.0 - 300.0 ng/mL Final   Hold iron replacement while awaiting sepsis workup  MAXIM weekly    #Secondary hyperparathyroidism  Calcium   Date Value Ref Range Status   11/26/2024 8.7 8.7 - 10.5 mg/dL Final      Phosphorus   Date Value Ref Range Status   11/26/2024 3.6 2.7 - 4.5 mg/dL Final     Obtain PTH    ID  * Severe sepsis  Admitted to , workup pending. On abx        Thank you for your consult. I will follow-up with patient. Please contact us if you have any additional questions.    KYARA Medina  Nephrology  [facility] [place] Med Surg

## 2024-11-27 NOTE — SUBJECTIVE & OBJECTIVE
Interval History:  Had T-max of 101° F overnight.    Review of Systems   Constitutional: Negative.    Respiratory: Negative.     Cardiovascular: Negative.    Gastrointestinal: Negative.    Genitourinary: Negative.    Musculoskeletal: Negative.      Objective:     Vital Signs (Most Recent):  Temp: 98.1 °F (36.7 °C) (11/27/24 1129)  Pulse: 93 (11/27/24 1129)  Resp: 18 (11/27/24 1129)  BP: 137/63 (11/27/24 1129)  SpO2: 97 % (11/27/24 1129) Vital Signs (24h Range):  Temp:  [97.8 °F (36.6 °C)-101 °F (38.3 °C)] 98.1 °F (36.7 °C)  Pulse:  [] 93  Resp:  [16-20] 18  SpO2:  [94 %-98 %] 97 %  BP: (109-137)/(56-73) 137/63     Weight: 64 kg (141 lb 1.5 oz)  Body mass index is 26.66 kg/m².    Intake/Output Summary (Last 24 hours) at 11/27/2024 1142  Last data filed at 11/27/2024 0800  Gross per 24 hour   Intake 581.17 ml   Output 1000 ml   Net -418.83 ml         Physical Exam  Constitutional:       General: She is not in acute distress.  Cardiovascular:      Rate and Rhythm: Normal rate.      Pulses: Normal pulses.   Pulmonary:      Effort: No respiratory distress.      Breath sounds: Normal breath sounds. No wheezing.   Abdominal:      General: Bowel sounds are normal. There is no distension.      Palpations: Abdomen is soft.      Tenderness: There is no abdominal tenderness.   Musculoskeletal:      Right lower leg: No edema.      Left lower leg: No edema.   Skin:     Findings: No lesion (Left upper extremity AV fistula with good thrill).   Neurological:      Mental Status: She is alert and oriented to person, place, and time. Mental status is at baseline.             Significant Labs: All pertinent labs within the past 24 hours have been reviewed.    Significant Imaging: I have reviewed all pertinent imaging results/findings within the past 24 hours.

## 2024-11-27 NOTE — PLAN OF CARE
Problem: Adult Inpatient Plan of Care  Goal: Plan of Care Review  Outcome: Progressing  Flowsheets (Taken 11/27/2024 0402)  Plan of Care Reviewed With: patient  Goal: Patient-Specific Goal (Individualized)  Outcome: Progressing  Goal: Absence of Hospital-Acquired Illness or Injury  Outcome: Progressing  Intervention: Identify and Manage Fall Risk  Flowsheets (Taken 11/27/2024 0402)  Safety Promotion/Fall Prevention:   assistive device/personal item within reach   lighting adjusted   nonskid shoes/socks when out of bed   side rails raised x 2   room near unit station  Intervention: Prevent Skin Injury  Flowsheets (Taken 11/27/2024 0402)  Body Position: position changed independently  Skin Protection: incontinence pads utilized  Device Skin Pressure Protection: absorbent pad utilized/changed  Intervention: Prevent and Manage VTE (Venous Thromboembolism) Risk  Flowsheets (Taken 11/27/2024 0402)  VTE Prevention/Management:   ambulation promoted   dorsiflexion/plantar flexion performed   bleeding precautions maintained   bleeding risk assessed  Intervention: Prevent Infection  Flowsheets (Taken 11/27/2024 0402)  Infection Prevention:   equipment surfaces disinfected   hand hygiene promoted   rest/sleep promoted   single patient room provided  Goal: Optimal Comfort and Wellbeing  Outcome: Progressing  Intervention: Monitor Pain and Promote Comfort  Flowsheets (Taken 11/27/2024 0402)  Pain Management Interventions:   around-the-clock dosing utilized   care clustered   pillow support provided  Intervention: Provide Person-Centered Care  Flowsheets (Taken 11/27/2024 0402)  Trust Relationship/Rapport: care explained  Goal: Readiness for Transition of Care  Outcome: Progressing     Problem: Sepsis/Septic Shock  Goal: Optimal Coping  Outcome: Progressing  Goal: Absence of Bleeding  Outcome: Progressing  Intervention: Monitor and Manage Bleeding  Flowsheets (Taken 11/27/2024 0402)  Bleeding Precautions: blood pressure closely  monitored  Goal: Blood Glucose Level Within Targeted Range  Outcome: Progressing  Intervention: Optimize Glycemic Control  Flowsheets (Taken 11/27/2024 0402)  Glycemic Management: blood glucose monitored  Goal: Absence of Infection Signs and Symptoms  Outcome: Progressing  Intervention: Initiate Sepsis Management  Flowsheets (Taken 11/27/2024 0402)  Infection Prevention:   equipment surfaces disinfected   hand hygiene promoted   rest/sleep promoted   single patient room provided  Isolation Precautions: precautions maintained  Intervention: Promote Recovery  Flowsheets (Taken 11/27/2024 0402)  Sleep/Rest Enhancement:   awakenings minimized   reading promoted   room darkened  Airway/Ventilation Support: comfort measures provided  Activity Management:   Arm raise - L1   Rolling - L1  Goal: Optimal Nutrition Intake  Outcome: Progressing  Intervention: Optimize Nutrition Delivery  Flowsheets (Taken 11/27/2024 0402)  Nutrition Interventions: diet adjusted     Problem: Infection  Goal: Absence of Infection Signs and Symptoms  Outcome: Progressing     Problem: Wound  Goal: Optimal Coping  Outcome: Progressing  Goal: Optimal Functional Ability  Outcome: Progressing  Goal: Absence of Infection Signs and Symptoms  Outcome: Progressing  Intervention: Prevent or Manage Infection  Flowsheets (Taken 11/27/2024 0402)  Isolation Precautions: precautions maintained  Goal: Improved Oral Intake  Outcome: Progressing  Goal: Optimal Pain Control and Function  Outcome: Progressing  Intervention: Prevent or Manage Pain  Flowsheets (Taken 11/27/2024 0402)  Sleep/Rest Enhancement:   awakenings minimized   reading promoted   room darkened  Pain Management Interventions:   around-the-clock dosing utilized   care clustered   pillow support provided  Goal: Skin Health and Integrity  Outcome: Progressing  Goal: Optimal Wound Healing  Outcome: Progressing     Problem: Hemodialysis  Goal: Safe, Effective Therapy Delivery  Outcome:  Progressing  Goal: Effective Tissue Perfusion  Outcome: Progressing  Goal: Absence of Infection Signs and Symptoms  Outcome: Progressing  Intervention: Prevent or Manage Infection  Flowsheets (Taken 11/27/2024 0402)  Infection Prevention:   equipment surfaces disinfected   hand hygiene promoted   rest/sleep promoted   single patient room provided

## 2024-11-27 NOTE — PLAN OF CARE
11/27/24 0950   Medicare Message   Important Message from Medicare regarding Discharge Appeal Rights Given to patient/caregiver;Explained to patient/caregiver;Signed/date by patient/caregiver   Date IMM was signed 11/27/24   Time IMM was signed 0940

## 2024-11-27 NOTE — NURSING
Patient taken to CT via wheelchair. Patient back on floor. Patient complained of a cough. Gave prn cough med. Patient did not complain of any pain throughout shift. Bed in lowest position, wheels locked, call bell in reach, side rails up x2.

## 2024-11-27 NOTE — PROGRESS NOTES
[facility] Medicine  Progress Note    Patient Name: Kristin Goodman  MRN: [id number]  Patient Class: IP- Inpatient   Admission Date: 11/25/2024  Length of Stay: 2 days  Attending Physician: Sophie Garner,*  Primary Care Provider: Josy eKane PA-C        Subjective:     Principal Problem:Severe sepsis        HPI:  78 yo w/ ANCA+ microscopic polyangitis dx 2 yrs ago, w/ associated GN causing ESRD, on HD x 2 yrs but non oliguric  She seen rheum and gets rituximab every 6 mo, just had her infusion last week.  She goes to HD every T/Th/Sat and tolerates it well.  She also has HTN, hypothyroidism, h/o left brachiocephalic vein occlusion (I believe this is why she takes Eliquis?), DJD, VHD, dyslipidemia.  Despite all this she generally feels pretty good and is active - After HD Sat she felt sluggish which is not unusual, but woke up Sunday feeling like she had a cold w/ cough productive of yellow sputum and congestion. She went to be and got up this AM feeling more sluggish and had a temp of 101.4, daughter brought her to ED, temp 102.3, sepsis alert activated, no bolus d/t ESRD, CXR w/o acute disease, urine w/ WBC and RBC but no bacteria, source of infection uncertain. She has had ESBL + e coli UTI and enterococcal bacteremia in the past but it's been awhile.  SARS CoV2 negative, PCT 2.1  Given vanc and cefepime.  Nephrology aware of admit, will likely need HD tomorrow.  BCX done in ED.  HM to admit.  Pt currently lying on stretcher, feeling better, daughter at bedside.      Overview/Hospital Course:  78 yo w/ ANCA+ microscopic polyangitis dx 2 yrs ago, w/ associated GN on rituximab every 6 months, (last infusion last week) ESRD, on HD TTS x 2 yrs , non oliguric, HTN, hypothyroidism, h/o left brachiocephalic vein occlusion on Eliquis who was admitted for sepsis secondary to ?  Unclear etiology. ?  Concern for pneumonia given fever/cough/congestion on presentation Chest x-ray with  no acute consolidation Respiratory cultures/RIP negative.  tested negative for COVID/flu.  Procalcitonin 2.09 Blood cultures with E coli.  UA revealed > 100 WBC with no bacteria.  Urine cultures with Gram-negative rods  Nephrology consulted for inpatient HD needs.      Interval History:  Had T-max of 101° F overnight.    Review of Systems   Constitutional: Negative.    Respiratory: Negative.     Cardiovascular: Negative.    Gastrointestinal: Negative.    Genitourinary: Negative.    Musculoskeletal: Negative.      Objective:     Vital Signs (Most Recent):  Temp: 98.1 °F (36.7 °C) (11/27/24 1129)  Pulse: 93 (11/27/24 1129)  Resp: 18 (11/27/24 1129)  BP: 137/63 (11/27/24 1129)  SpO2: 97 % (11/27/24 1129) Vital Signs (24h Range):  Temp:  [97.8 °F (36.6 °C)-101 °F (38.3 °C)] 98.1 °F (36.7 °C)  Pulse:  [] 93  Resp:  [16-20] 18  SpO2:  [94 %-98 %] 97 %  BP: (109-137)/(56-73) 137/63     Weight: 64 kg (141 lb 1.5 oz)  Body mass index is 26.66 kg/m².    Intake/Output Summary (Last 24 hours) at 11/27/2024 1142  Last data filed at 11/27/2024 0800  Gross per 24 hour   Intake 581.17 ml   Output 1000 ml   Net -418.83 ml         Physical Exam  Constitutional:       General: She is not in acute distress.  Cardiovascular:      Rate and Rhythm: Normal rate.      Pulses: Normal pulses.   Pulmonary:      Effort: No respiratory distress.      Breath sounds: Normal breath sounds. No wheezing.   Abdominal:      General: Bowel sounds are normal. There is no distension.      Palpations: Abdomen is soft.      Tenderness: There is no abdominal tenderness.   Musculoskeletal:      Right lower leg: No edema.      Left lower leg: No edema.   Skin:     Findings: No lesion (Left upper extremity AV fistula with good thrill).   Neurological:      Mental Status: She is alert and oriented to person, place, and time. Mental status is at baseline.             Significant Labs: All pertinent labs within the past 24 hours have been  reviewed.    Significant Imaging: I have reviewed all pertinent imaging results/findings within the past 24 hours.      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  UA with >  100 WBC, no bacteria however urine culture with Gram-negative rods  Procalcitonin 2 .09 Chest x-ray with no evidence of focal consolidation  On vanc/cefepime.  DC vanc, continue cefepime while we await final cultures  Leukocytosis improved.  CT abdomen with possible 1.6 cm rectal mass.  Last colonoscopy 10/24.  Consulted GI for assistance  Obtain repeat blood cultures      Advance care planning  Pt is Full Code, has Living Will and would not want long term life support  Daughter aware    ACP time spent 5 minute    ESRD (end stage renal disease)  Nephrology on board for inpatient HD needs    Current long-term use of anticoagulant medication with history of deep venous thrombosis (DVT)  Continue Eliquis     Anemia due to chronic kidney disease  Hemoglobin Appears stable      Antineutrophilic cytoplasmic antibody (ANCA) vasculitis  Undergoes rituximab maintenance w/ Dr Palacio q 6 mo  Is on atovaquone for PJP prophylaxis presumably  Relatively immune compromised       Chronic diastolic heart failure    Echo with EF 55-60% G1 DD.  Appears euvolemic  Sees Dr Mills routinely        Primary hypertension  Blood pressure at goal.  Holding home antihypertensives in the setting of sepsis    Hypothyroid  TSH 0.6  Continue levothyroxine 25 mcg QD          VTE Risk Mitigation (From admission, onward)           Ordered     apixaban tablet 5 mg  2 times daily         11/25/24 1731     Reason for No Pharmacological VTE Prophylaxis  Once        Question:  Reasons:  Answer:  Risk of Bleeding    11/25/24 1558     IP VTE HIGH RISK PATIENT  Once         11/25/24 1558     Place sequential compression device  Until discontinued          11/25/24 1558                    Discharge Planning   ANTHONY: 11/28/2024     Code Status: Full Code   Is the patient medically ready for discharge?:     Reason for patient still in hospital (select all that apply): Patient trending condition and Treatment  Discharge Plan A: Home with family                  Sophie Garner MD  Department of Hospital Medicine   [facility]

## 2024-11-27 NOTE — NURSING
[facility]  Nurses Note -- 4 Eyes      11/27/2024       Skin assessed on: Q Shift      [x] No Pressure Injuries Present    [x]Prevention Measures Documented    [] Yes LDA  for Pressure Injury Previously documented     [] Yes New Pressure Injury Discovered   [] LDA for New Pressure Injury Added      Attending RN:  Bernadine Tamez LPN     Second RN:  CRISTINE Randolph

## 2024-11-27 NOTE — SUBJECTIVE & OBJECTIVE
Interval History: UF 1L yesterday, tolerated well. UOP 3x over 24hrs. NAEON. Normal appetite, no complaints.     Review of patient's allergies indicates:   Allergen Reactions    Ampicillin     Lactose Diarrhea    Peaches [peach (prunus persica)] Other (See Comments)     Pt unable to state type of reaction. Information obtained from daughter who states she was informed of allergy from patient.    Penicillins      Other reaction(s): Hives, anaphylaxis    Sulfa (sulfonamide antibiotics) Rash and Hives     Current Facility-Administered Medications   Medication Frequency    acetaminophen tablet 650 mg Q4H PRN    acetaminophen tablet 650 mg Q8H PRN    albuterol sulfate nebulizer solution 2.5 mg Q6H PRN    apixaban tablet 5 mg BID    atovaquone 750 mg/5 mL oral liquid 1,500 mg Daily    carbamide peroxide 6.5 % otic solution 5 drop BID    ceFEPIme injection 1 g Q24H    dextrose 10% bolus 125 mL 125 mL PRN    dextrose 10% bolus 250 mL 250 mL PRN    epoetin hira-epbx injection 3,200 Units Q7 Days    fluticasone propionate 50 mcg/actuation nasal spray 100 mcg Daily    glucagon (human recombinant) injection 1 mg PRN    glucose chewable tablet 16 g PRN    glucose chewable tablet 24 g PRN    hydrALAZINE injection 10 mg Q4H PRN    levothyroxine tablet 25 mcg Before breakfast    melatonin tablet 6 mg Nightly PRN    mupirocin 2 % ointment BID    naloxone 0.4 mg/mL injection 0.02 mg PRN    pantoprazole EC tablet 40 mg Daily    prochlorperazine injection Soln 5 mg Q6H PRN    QUEtiapine tablet 25 mg Nightly PRN    sevelamer carbonate tablet 800 mg TID WM    simethicone chewable tablet 80 mg QID PRN    sodium chloride 0.9% flush 10 mL Q12H PRN    torsemide tablet 100 mg Daily       Objective:     Vital Signs (Most Recent):  Temp: 98.1 °F (36.7 °C) (11/27/24 1129)  Pulse: 93 (11/27/24 1129)  Resp: 18 (11/27/24 1129)  BP: 137/63 (11/27/24 1129)  SpO2: 97 % (11/27/24 1129) Vital Signs (24h Range):  Temp:  [98.1 °F (36.7 °C)-101 °F (38.3  °C)] 98.1 °F (36.7 °C)  Pulse:  [] 93  Resp:  [16-20] 18  SpO2:  [94 %-98 %] 97 %  BP: (109-137)/(56-73) 137/63     Weight: 64 kg (141 lb 1.5 oz) (11/25/24 1940)  Body mass index is 26.66 kg/m².  Body surface area is 1.66 meters squared.    I/O last 3 completed shifts:  In: 701.2 [P.O.:600; IV Piggyback:101.2]  Out: 1000 [Other:1000]     Physical Exam  Vitals and nursing note reviewed.   HENT:      Head: Normocephalic.      Nose: Nose normal.      Mouth/Throat:      Mouth: Mucous membranes are moist.   Eyes:      Extraocular Movements: Extraocular movements intact.      Conjunctiva/sclera: Conjunctivae normal.      Pupils: Pupils are equal, round, and reactive to light.   Cardiovascular:      Rate and Rhythm: Normal rate.   Pulmonary:      Effort: Pulmonary effort is normal.      Breath sounds: Normal breath sounds.   Abdominal:      Palpations: Abdomen is soft.   Musculoskeletal:      Cervical back: Normal range of motion.      Right lower leg: No edema.      Left lower leg: No edema.   Skin:     General: Skin is warm.   Neurological:      General: No focal deficit present.      Mental Status: She is alert.   Psychiatric:         Mood and Affect: Mood normal.          Significant Labs:  CBC:   Recent Labs   Lab 11/27/24  0448   WBC 10.01   RBC 3.41*   HGB 10.0*   HCT 32.2*      MCV 94   MCH 29.3   MCHC 31.1*     CMP:   Recent Labs   Lab 11/25/24  1219 11/26/24  0404 11/27/24  0448   *   < > 104   CALCIUM 9.1   < > 9.3   ALBUMIN 3.2*  --  2.9*   PROT 6.9  --   --       < > 139   K 4.0   < > 3.6   CO2 27   < > 26      < > 102   BUN 49*   < > 24*   CREATININE 5.4*   < > 3.9*   ALKPHOS 68  --   --    ALT 9*  --   --    AST 17  --   --    BILITOT 0.4  --   --     < > = values in this interval not displayed.     All labs within the past 24 hours have been reviewed.     Significant Imaging:  Labs: Reviewed

## 2024-11-27 NOTE — H&P (VIEW-ONLY)
[facility] Gastroenterology Consultation Note    Reason for consult: posterior rectal mass on CT, recent C scope 10/24.    PCP:   Josy Keane       LOS: 2        Initial History of Present Illness (HPI):  This is a 79 y.o. female consulted to GI service for posterior rectal mass on CT, recent C scope 10/24. PMH ANCA+ microscopic polyangitis dx 2 yrs ago, w/ associated GN causing ESRD, on HD x 2 yrs but non oliguric. She seen rheum and gets rituximab every 6 mo, just had her infusion last week.  She goes to HD every T/Th/Sat and tolerates it well.  She also has HTN, hypothyroidism, h/o left brachiocephalic vein occlusion (I believe this is why she takes Eliquis?), DJD, VHD, dyslipidemia.   Patient admitted for sepsis and is uroseptic. Denies complaints of n/v, abdominal pain, BRBPR, melena, diarrhea or constipaiton.       Imaging - posterior rectal mass on CT      Medical History:  has a past medical history of Acute blood loss anemia (10/17/2022), Acute hypoxemic respiratory failure (10/23/2022), Allergy, Anticoagulant long-term use, Back pain, Chronic diastolic heart failure (08/31/2020), Chronic diastolic heart failure (08/31/2020), Colon polyp, Disorder of kidney and ureter, Encounter for blood transfusion, H/O Bell's palsy (2006), Helicobacter pylori (H. pylori), HTN (hypertension), Hypothyroid, OA (osteoarthritis), Occlusion of vein, DONAVAN (obstructive sleep apnea) (11/09/2020), Pneumonia due to other staphylococcus, Pulmonary HTN (08/31/2020), Sepsis due to pneumonia (10/17/2022), Septic shock (10/27/2022), Severe sepsis (11/25/2024), Trouble in sleeping, Urinary incontinence, and Vasculitis, ANCA positive.    Surgical History:  has a past surgical history that includes Total abdominal hysterectomy; Oophorectomy; Colonoscopy (N/A, 09/28/2020); Knee arthroscopy w/ meniscectomy (Right, 12/21/2021); Arthroscopic chondroplasty of knee joint (Right, 12/21/2021); Synovectomy of knee (Right,  2021); Esophagogastroduodenoscopy (N/A, 2022); Placement of arteriovenous graft (Left, 2023); Esophagogastroduodenoscopy (N/A, 10/17/2024); Colonoscopy (N/A, 10/17/2024); and AV fistula placement (Left).      Objective Findings:    Vital Signs:  Temp:  [97.8 °F (36.6 °C)-101 °F (38.3 °C)]   Pulse:  []   Resp:  [16-20]   BP: (109-137)/(56-73)   SpO2:  [94 %-98 %]   Body mass index is 26.66 kg/m².      Physical Exam  Vitals and nursing note reviewed.   Constitutional:       Appearance: Normal appearance.   HENT:      Head: Normocephalic.   Pulmonary:      Effort: Pulmonary effort is normal.   Abdominal:      General: Bowel sounds are normal.      Palpations: Abdomen is soft.   Skin:     General: Skin is warm and dry.   Neurological:      Mental Status: She is alert and oriented to person, place, and time.   Psychiatric:         Mood and Affect: Mood normal.         Behavior: Behavior normal.         Thought Content: Thought content normal.         Judgment: Judgment normal.               Labs:  Lab Results   Component Value Date    WBC 10.01 2024    HGB 10.0 (L) 2024    HCT 32.2 (L) 2024     2024    CHOL 168 2024    TRIG 181 (H) 2024    HDL 44 2024    ALT 9 (L) 2024    AST 17 2024     2024    K 3.6 2024     2024    CREATININE 3.9 (H) 2024    BUN 24 (H) 2024    CO2 26 2024    TSH 0.632 2024    INR 1.0 2023    HGBA1C 4.7 2024             Imagin/26 CT abdomen pelvis without contrast- Small left pleural effusion.     Coronary artery calcifications.     Small liver lesions most likely cysts.     Stable left adrenal adenoma.     Diverticulosis but no diverticulitis.     Possible posterior rectal mass      Endoscopy: 10/17/24 EGD- No gross lesions in the entire esophagus.                          - Erythematous mucosa in the antrum. Biopsied.                          -  Non-bleeding duodenal ulcers with a clean ulcer                          base (Hai Class III).   10/27/24 Colonoscopy-  One 2 mm polyp at the ileocecal valve, removed                          with a jumbo cold forceps. Resected and retrieved.                          - One 4 mm polyp in the ascending colon, removed                          with a cold snare. Resected and retrieved.                          - Three 3 to 5 mm polyps in the transverse colon,                          removed with a cold snare. Resected and retrieved.                          - Five 3 to 5 mm polyps in the descending colon,                          removed with a cold snare. Resected and retrieved.                Abnormal Ct abdomen. Rectal mass.  Plan/ Recommendations:  1.  Patient without any GI complaints this hospitalization with recent dual endoscopy in Oct 2024 with findings of gastric ulcer and colonic polyps. Path on polyps are tubular adenoma. No visualization of any mass in colon at time of endoscopy. Plan for repeat outpatient colonoscopy-request sent.    Will sign off    Thank you so much for allowing us to participate in the care of Kristin Goodman . Please contact us if you have any additional questions.    Mariam Painter NP  Gastroenterology  [facility] [place] [facility]

## 2024-11-27 NOTE — PROGRESS NOTES
Pharmacokinetic Assessment Follow Up: IV Vancomycin    Vancomycin serum concentration assessment(s):    The random level was drawn correctly and can be used to guide therapy at this time. The measurement is within the desired definitive target range of 10 to 20 mcg/mL.    Vancomycin Regimen Plan:    Give 500 mg after dialysis.  Re-dose when the random level is less than 20 mcg/mL, next level to be drawn at 0400 on 11/28/2024    Drug levels (last 3 results):  Recent Labs   Lab Result Units 11/26/24  0404 11/27/24  0448   Vancomycin, Random ug/mL 17.3 18.3       Pharmacy will continue to follow and monitor vancomycin.    Please contact pharmacy at extension [phone] for questions regarding this assessment.    Thank you for the consult,   Yong Cotter Jr       Patient brief summary:  Kristin Goodman is a 79 y.o. female initiated on antimicrobial therapy with IV Vancomycin for treatment of sepsis    Drug Allergies:   Review of patient's allergies indicates:   Allergen Reactions    Ampicillin     Lactose Diarrhea    Peaches [peach (prunus persica)] Other (See Comments)     Pt unable to state type of reaction. Information obtained from daughter who states she was informed of allergy from patient.    Penicillins      Other reaction(s): Hives, anaphylaxis    Sulfa (sulfonamide antibiotics) Rash and Hives       Actual Body Weight:   64 kg    Renal Function:   Estimated Creatinine Clearance: 10.4 mL/min (A) (based on SCr of 3.9 mg/dL (H)).,     Dialysis Method (if applicable):  Intermittent HD    CBC (last 72 hours):  Recent Labs   Lab Result Units 11/25/24  1219 11/26/24  0404 11/27/24  0448   WBC K/uL 16.03* 11.07 10.01   Hemoglobin g/dL 9.4* 9.3* 10.0*   Hematocrit % 30.6* 28.9* 32.2*   Platelets K/uL 197 189 237   Gran % % 73.5* 71.2 57.7   Lymph % % 11.9* 10.7* 19.8   Mono % % 12.4 15.6* 18.7*   Eosinophil % % 1.5 1.8 2.9   Basophil % % 0.2 0.2 0.4   Differential Method  Automated Automated Automated       Metabolic  Panel (last 72 hours):  Recent Labs   Lab Result Units 11/25/24  1219 11/25/24  1320 11/26/24  0404 11/27/24  0448   Sodium mmol/L 137  --  138 139   Potassium mmol/L 4.0  --  3.5 3.6   Chloride mmol/L 100  --  104 102   CO2 mmol/L 27  --  23 26   Glucose mg/dL 115*  --  80 104   Glucose, UA   --  Negative  --   --    BUN mg/dL 49*  --  55* 24*   Creatinine mg/dL 5.4*  --  5.2* 3.9*   Albumin g/dL 3.2*  --   --  2.9*   Total Bilirubin mg/dL 0.4  --   --   --    Alkaline Phosphatase U/L 68  --   --   --    AST U/L 17  --   --   --    ALT U/L 9*  --   --   --    Magnesium mg/dL 1.8  --  1.9  --    Phosphorus mg/dL  --   --  3.6 3.2       Vancomycin Administrations:  vancomycin given in the last 96 hours                     vancomycin (VANCOCIN) 500 mg in D5W 100 mL IVPB (MB+) (mg) 500 mg New Bag 11/26/24 0613    vancomycin (VANCOCIN) 1,000 mg in 0.9% NaCl 250 mL IVPB (admixture device) (mg) 1,000 mg New Bag 11/25/24 1240                    Microbiologic Results:  Microbiology Results (last 7 days)       Procedure Component Value Units Date/Time    Urine culture [[id number]]  (Abnormal) Collected: 11/25/24 1320    Order Status: Completed Specimen: Urine Updated: 11/27/24 0625     Urine Culture, Routine GRAM NEGATIVE LAWRENCE  10,000 - 49,999 cfu/ml  Identification and susceptibility pending      Narrative:      Specimen Source->Urine    Blood culture x two cultures. Draw prior to antibiotics. [[id number]]  (Abnormal)  (Susceptibility) Collected: 11/25/24 1219    Order Status: Completed Specimen: Blood from Peripheral, Antecubital, Right Updated: 11/27/24 0539     Blood Culture, Routine Gram stain sumi bottle: Gram negative rods      Results called to and read back by: CRISTINE Savage      11/26/2024 02:51 BML      ESCHERICHIA COLI    Narrative:      Aerobic and anaerobic    Respiratory Infection Panel (PCR), Nasopharyngeal [[id number]] Collected: 11/26/24 1028    Order Status: Completed Specimen: Nasopharyngeal Swab Updated:  11/26/24 2050     Respiratory Infection Panel Source NP Swab     Adenovirus Not Detected     Coronavirus 229E, Common Cold Virus Not Detected     Coronavirus HKU1, Common Cold Virus Not Detected     Coronavirus NL63, Common Cold Virus Not Detected     Coronavirus OC43, Common Cold Virus Not Detected     Comment: The Coronavirus strains detected in this test cause the common cold.  These strains are not the COVID-19 (novel Coronavirus)strain   associated with the respiratory disease outbreak.          SARS-CoV2 (COVID-19) Qualitative PCR Not Detected     Human Metapneumovirus Not Detected     Human Rhinovirus/Enterovirus Not Detected     Influenza A (subtypes H1, H1-2009,H3) Not Detected     Influenza B Not Detected     Parainfluenza Virus 1 Not Detected     Parainfluenza Virus 2 Not Detected     Parainfluenza Virus 3 Not Detected     Parainfluenza Virus 4 Not Detected     Respiratory Syncytial Virus Not Detected     Bordetella Parapertussis (BV9420) Not Detected     Bordetella pertussis (ptxP) Not Detected     Chlamydia pneumoniae Not Detected     Mycoplasma pneumoniae Not Detected    Narrative:      Assay not valid for lower respiratory specimens, alternate  testing required.    Culture, Respiratory with Gram Stain [[id number]] Collected: 11/26/24 1938    Order Status: Sent Specimen: Respiratory from Sputum Updated: 11/26/24 2028    Blood culture x two cultures. Draw prior to antibiotics. [[id number]] Collected: 11/25/24 1213    Order Status: Completed Specimen: Blood from Peripheral, Hand, Right Updated: 11/26/24 1503     Blood Culture, Routine No Growth to date      No Growth to date    Narrative:      Aerobic and anaerobic    Rapid Organism ID by PCR (from Blood culture) [[id number]]  (Abnormal) Collected: 11/25/24 1135    Order Status: Completed Updated: 11/26/24 1417     Enterococcus faecalis Not Detected     Enterococcus faecium Not Detected     Listeria monocytogenes Not Detected     Staphylococcus spp. Not  Detected     Staphylococcus aureus Not Detected     Staphylococcus epidermidis Not Detected     Staphylococcus lugdunensis Not Detected     Streptococcus species Not Detected     Streptococcus agalactiae Not Detected     Streptococcus pneumoniae Not Detected     Streptococcus pyogenes Not Detected     Acinetobacter calcoaceticus/baumannii complex Not Detected     Bacteroides fragilis Not Detected     Enterobacterales See species for ID     Enterobacter cloacae complex Not Detected     Escherichia coli Detected     Klebsiella aerogenes Not Detected     Klebsiella oxytoca Not Detected     Klebsiella pneumoniae group Not Detected     Proteus Not Detected     Salmonella sp Not Detected     Serratia marcescens Not Detected     Haemophilus influenzae Not Detected     Neisseria meningtidis Not Detected     Pseudomonas aeruginosa Not Detected     Stenotrophomonas maltophilia Not Detected     Candida albicans Not Detected     Candida auris Not Detected     Candida glabrata Not Detected     Candida krusei Not Detected     Candida parapsilosis Not Detected     Candida tropicalis Not Detected     Cryptococcus neoformans/gattii Not Detected     CTX-M (ESBL ) Not Detected     IMP (Carbapenem resistant) Not Detected     KPC resistance gene (Carbapenem resistant) Not Detected     mcr-1  Not Detected     mec A/C  Test Not Applicable     mec A/C and MREJ (MRSA) gene Test Not Applicable     NDM (Carbapenem resistant) Not Detected     OXA-48-like (Carbapenem resistant) Not Detected     van A/B (VRE gene) Test Not Applicable     VIM (Carbapenem resistant) Not Detected    Narrative:      Aerobic and anaerobic

## 2024-11-27 NOTE — ASSESSMENT & PLAN NOTE
Sees Dr Mills routinely  TTE 1/2023:  Normal systolic function.  The estimated ejection fraction is 60%.  Grade II left ventricular diastolic dysfunction.  Small circumferential pericardial effusion.  Moderate to severe left atrial enlargement.  Mild mitral regurgitation.  Mild to moderate tricuspid regurgitation.  Normal right ventricular size with normal right ventricular systolic function.  The quantitatively derived ejection fraction is 56%.  Normal central venous pressure (3 mmHg).  The estimated PA systolic pressure is 36 mmHg.  No obvious vegitations.

## 2024-11-28 PROBLEM — N39.0 UTI (URINARY TRACT INFECTION): Status: ACTIVE | Noted: 2024-11-28

## 2024-11-28 LAB
ALBUMIN SERPL BCP-MCNC: 2.6 G/DL (ref 3.5–5.2)
ANION GAP SERPL CALC-SCNC: 11 MMOL/L (ref 8–16)
BACTERIA SPEC AEROBE CULT: NORMAL
BACTERIA UR CULT: ABNORMAL
BUN SERPL-MCNC: 33 MG/DL (ref 8–23)
CALCIUM SERPL-MCNC: 8.6 MG/DL (ref 8.7–10.5)
CHLORIDE SERPL-SCNC: 103 MMOL/L (ref 95–110)
CO2 SERPL-SCNC: 24 MMOL/L (ref 23–29)
CREAT SERPL-MCNC: 4.4 MG/DL (ref 0.5–1.4)
EST. GFR  (NO RACE VARIABLE): 10 ML/MIN/1.73 M^2
GLUCOSE SERPL-MCNC: 98 MG/DL (ref 70–110)
GRAM STN SPEC: NORMAL
PHOSPHATE SERPL-MCNC: 2.1 MG/DL (ref 2.7–4.5)
POTASSIUM SERPL-SCNC: 3.3 MMOL/L (ref 3.5–5.1)
SODIUM SERPL-SCNC: 138 MMOL/L (ref 136–145)

## 2024-11-28 PROCEDURE — 63600175 PHARM REV CODE 636 W HCPCS: Performed by: STUDENT IN AN ORGANIZED HEALTH CARE EDUCATION/TRAINING PROGRAM

## 2024-11-28 PROCEDURE — 63600175 PHARM REV CODE 636 W HCPCS: Performed by: HOSPITALIST

## 2024-11-28 PROCEDURE — 25000003 PHARM REV CODE 250: Performed by: HOSPITALIST

## 2024-11-28 PROCEDURE — 25000003 PHARM REV CODE 250: Performed by: STUDENT IN AN ORGANIZED HEALTH CARE EDUCATION/TRAINING PROGRAM

## 2024-11-28 PROCEDURE — 80069 RENAL FUNCTION PANEL: CPT | Performed by: STUDENT IN AN ORGANIZED HEALTH CARE EDUCATION/TRAINING PROGRAM

## 2024-11-28 PROCEDURE — 21400001 HC TELEMETRY ROOM

## 2024-11-28 PROCEDURE — 36415 COLL VENOUS BLD VENIPUNCTURE: CPT | Performed by: STUDENT IN AN ORGANIZED HEALTH CARE EDUCATION/TRAINING PROGRAM

## 2024-11-28 PROCEDURE — 25000003 PHARM REV CODE 250: Performed by: INTERNAL MEDICINE

## 2024-11-28 RX ORDER — POTASSIUM CHLORIDE 20 MEQ/1
20 TABLET, EXTENDED RELEASE ORAL ONCE
Status: COMPLETED | OUTPATIENT
Start: 2024-11-28 | End: 2024-11-28

## 2024-11-28 RX ORDER — CEFTRIAXONE 2 G/1
2 INJECTION, POWDER, FOR SOLUTION INTRAMUSCULAR; INTRAVENOUS
Status: DISCONTINUED | OUTPATIENT
Start: 2024-11-28 | End: 2024-12-01 | Stop reason: HOSPADM

## 2024-11-28 RX ORDER — HYDROCODONE BITARTRATE AND ACETAMINOPHEN 5; 325 MG/1; MG/1
1 TABLET ORAL EVERY 6 HOURS PRN
Status: DISCONTINUED | OUTPATIENT
Start: 2024-11-28 | End: 2024-12-01 | Stop reason: HOSPADM

## 2024-11-28 RX ORDER — POLYETHYLENE GLYCOL 3350 17 G/17G
17 POWDER, FOR SOLUTION ORAL DAILY PRN
Status: DISCONTINUED | OUTPATIENT
Start: 2024-11-28 | End: 2024-12-01 | Stop reason: HOSPADM

## 2024-11-28 RX ADMIN — SEVELAMER CARBONATE 800 MG: 800 TABLET, FILM COATED ORAL at 11:11

## 2024-11-28 RX ADMIN — APIXABAN 5 MG: 5 TABLET, FILM COATED ORAL at 07:11

## 2024-11-28 RX ADMIN — POTASSIUM CHLORIDE 20 MEQ: 1500 TABLET, EXTENDED RELEASE ORAL at 09:11

## 2024-11-28 RX ADMIN — APIXABAN 5 MG: 5 TABLET, FILM COATED ORAL at 08:11

## 2024-11-28 RX ADMIN — ATOVAQUONE 1500 MG: 750 SUSPENSION ORAL at 07:11

## 2024-11-28 RX ADMIN — TORSEMIDE 100 MG: 10 TABLET ORAL at 07:11

## 2024-11-28 RX ADMIN — SEVELAMER CARBONATE 800 MG: 800 TABLET, FILM COATED ORAL at 07:11

## 2024-11-28 RX ADMIN — GUAIFENESIN AND DEXTROMETHORPHAN HYDROBROMIDE 1 TABLET: 600; 30 TABLET, EXTENDED RELEASE ORAL at 08:11

## 2024-11-28 RX ADMIN — MUPIROCIN: 20 OINTMENT TOPICAL at 08:11

## 2024-11-28 RX ADMIN — SEVELAMER CARBONATE 800 MG: 800 TABLET, FILM COATED ORAL at 04:11

## 2024-11-28 RX ADMIN — CEFTRIAXONE 2 G: 2 INJECTION, POWDER, FOR SOLUTION INTRAMUSCULAR; INTRAVENOUS at 01:11

## 2024-11-28 RX ADMIN — LEVOTHYROXINE SODIUM 25 MCG: 25 TABLET ORAL at 05:11

## 2024-11-28 RX ADMIN — PANTOPRAZOLE SODIUM 40 MG: 40 TABLET, DELAYED RELEASE ORAL at 07:11

## 2024-11-28 RX ADMIN — CARBAMIDE PEROXIDE 6.5% 5 DROP: 6.5 LIQUID AURICULAR (OTIC) at 08:11

## 2024-11-28 RX ADMIN — CEFEPIME 1 G: 1 INJECTION, POWDER, FOR SOLUTION INTRAMUSCULAR; INTRAVENOUS at 11:11

## 2024-11-28 RX ADMIN — CARBAMIDE PEROXIDE 6.5% 5 DROP: 6.5 LIQUID AURICULAR (OTIC) at 07:11

## 2024-11-28 RX ADMIN — FLUTICASONE PROPIONATE 100 MCG: 50 SPRAY, METERED NASAL at 07:11

## 2024-11-28 RX ADMIN — MUPIROCIN: 20 OINTMENT TOPICAL at 07:11

## 2024-11-28 RX ADMIN — GUAIFENESIN AND DEXTROMETHORPHAN HYDROBROMIDE 1 TABLET: 600; 30 TABLET, EXTENDED RELEASE ORAL at 07:11

## 2024-11-28 NOTE — PROGRESS NOTES
[facility] Medicine  Progress Note    Patient Name: Kristin Goodman  MRN: [id number]  Patient Class: IP- Inpatient   Admission Date: 11/25/2024  Length of Stay: 3 days  Attending Physician: Sophie Garner,*  Primary Care Provider: Josy Keane PA-C        Subjective:     Principal Problem:Severe sepsis        HPI:  80 yo w/ ANCA+ microscopic polyangitis dx 2 yrs ago, w/ associated GN causing ESRD, on HD x 2 yrs but non oliguric  She seen rheum and gets rituximab every 6 mo, just had her infusion last week.  She goes to HD every T/Th/Sat and tolerates it well.  She also has HTN, hypothyroidism, h/o left brachiocephalic vein occlusion (I believe this is why she takes Eliquis?), DJD, VHD, dyslipidemia.  Despite all this she generally feels pretty good and is active - After HD Sat she felt sluggish which is not unusual, but woke up Sunday feeling like she had a cold w/ cough productive of yellow sputum and congestion. She went to be and got up this AM feeling more sluggish and had a temp of 101.4, daughter brought her to ED, temp 102.3, sepsis alert activated, no bolus d/t ESRD, CXR w/o acute disease, urine w/ WBC and RBC but no bacteria, source of infection uncertain. She has had ESBL + e coli UTI and enterococcal bacteremia in the past but it's been awhile.  SARS CoV2 negative, PCT 2.1  Given vanc and cefepime.  Nephrology aware of admit, will likely need HD tomorrow.  BCX done in ED.  HM to admit.  Pt currently lying on stretcher, feeling better, daughter at bedside.      Overview/Hospital Course:  80 yo w/ ANCA+ microscopic polyangitis dx 2 yrs ago, w/ associated GN on rituximab every 6 months, (last infusion last week) ESRD, on HD TTS x 2 yrs , non oliguric, HTN, hypothyroidism, h/o left brachiocephalic vein occlusion on Eliquis who was admitted for sepsis secondary to UTI with E coli bacteremia Chest x-ray with no acute consolidation Respiratory cultures/RIP negative.   tested negative for COVID/flu.  Procalcitonin 2.09 Blood cultures with E coli.  UA revealed > 100 WBC with no bacteria.  Urine cultures with [facility]  Nephrology consulted for inpatient HD needs.  ID on board transitioned to ceftriaxone repeat blood cx pending    Interval History:     Review of Systems   Constitutional: Negative.    Respiratory: Negative.     Cardiovascular: Negative.    Gastrointestinal: Negative.    Genitourinary: Negative.    Musculoskeletal: Negative.    Neurological: Negative.    Psychiatric/Behavioral: Negative.       Objective:     Vital Signs (Most Recent):  Temp: 98.4 °F (36.9 °C) (11/28/24 1055)  Pulse: 92 (11/28/24 1055)  Resp: 18 (11/28/24 1055)  BP: (!) 148/73 (11/28/24 1055)  SpO2: 97 % (11/28/24 1055) Vital Signs (24h Range):  Temp:  [98.1 °F (36.7 °C)-99.2 °F (37.3 °C)] 98.4 °F (36.9 °C)  Pulse:  [] 92  Resp:  [18] 18  SpO2:  [95 %-99 %] 97 %  BP: (118-148)/(56-73) 148/73     Weight: 64 kg (141 lb 1.5 oz)  Body mass index is 26.66 kg/m².    Intake/Output Summary (Last 24 hours) at 11/28/2024 1326  Last data filed at 11/28/2024 0800  Gross per 24 hour   Intake 120 ml   Output --   Net 120 ml         Physical Exam  Constitutional:       General: She is not in acute distress.     Appearance: She is normal weight.   Cardiovascular:      Rate and Rhythm: Normal rate.      Pulses: Normal pulses.   Pulmonary:      Effort: No respiratory distress.      Breath sounds: Normal breath sounds. No wheezing.   Abdominal:      General: Bowel sounds are normal. There is no distension.      Palpations: Abdomen is soft.      Tenderness: There is no abdominal tenderness.   Musculoskeletal:      Right lower leg: No edema.      Left lower leg: No edema.   Skin:     General: Skin is warm.   Neurological:      Mental Status: She is alert and oriented to person, place, and time. Mental status is at baseline.   Psychiatric:         Mood and Affect: Mood normal.             Significant Labs: All pertinent  labs within the past 24 hours have been reviewed.    Significant Imaging: I have reviewed all pertinent imaging results/findings within the past 24 hours.      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  UA with >  100 WBC, no bacteria however urine culture with Gram-negative rods  Procalcitonin 2 .09 Chest x-ray with no evidence of focal consolidation  On vanc/cefepime.  DC vanc, transitioned to ceftriaxone  Leukocytosis improved.  CT abdomen with possible 1.6 cm rectal mass.  Last colonoscopy 10/24.  Consulted GI for assistance  repeat blood cultures pending      UTI (urinary tract infection)    UA revealed > 100 WBC with no bacteria.  Urine cultures with Ecoli  Nephrology consulted for inpatient HD needs.  ID on board transitioned to ceftriaxone repeat blood cx pending    Advance care planning  Pt is Full Code, has Living Will and would not want long term life support  Daughter aware    ACP time spent 5 minute    ESRD (end stage renal disease)  Nephrology on board for inpatient HD needs    Current long-term use of anticoagulant medication with history of deep venous thrombosis (DVT)  Continue Eliquis     Anemia due to chronic kidney disease  Hemoglobin Appears stable      Antineutrophilic cytoplasmic antibody (ANCA) vasculitis  Undergoes rituximab maintenance w/ Dr Palacio q 6 mo  Is on atovaquone for PJP prophylaxis presumably  Relatively immune compromised       Chronic diastolic heart failure    Echo with EF 55-60% G1 DD.  Appears euvolemic  Sees Dr Mills routinely        Primary hypertension  Blood pressure at goal.  Holding home antihypertensives in the setting of sepsis    Hypothyroid  TSH 0.6  Continue levothyroxine 25 mcg QD          VTE Risk Mitigation (From admission, onward)           Ordered     apixaban tablet 5 mg  2 times daily         11/25/24 6269     Reason for  No Pharmacological VTE Prophylaxis  Once        Question:  Reasons:  Answer:  Risk of Bleeding    11/25/24 1558     IP VTE HIGH RISK PATIENT  Once         11/25/24 1558     Place sequential compression device  Until discontinued         11/25/24 1558                    Discharge Planning   ANTHONY: 11/28/2024     Code Status: Full Code   Is the patient medically ready for discharge?:     Reason for patient still in hospital (select all that apply): Patient trending condition and Treatment  Discharge Plan A: Home with family                  Sophie Garner MD  Department of [facility] Medicine   [facility]

## 2024-11-28 NOTE — NURSING
MD placed consult to ID. Patient's IV came out. 20G R thumb inserted. Patient tolerated well. Patient did not complain of any pain throughout shift. Bed in lowest position, wheels locked, call bell in reach, side rails up x2.

## 2024-11-28 NOTE — PLAN OF CARE
Problem: Adult Inpatient Plan of Care  Goal: Plan of Care Review  Outcome: Progressing  Flowsheets (Taken 11/28/2024 0447)  Plan of Care Reviewed With: patient  Goal: Optimal Comfort and Wellbeing  Outcome: Progressing  Intervention: Monitor Pain and Promote Comfort  Flowsheets (Taken 11/28/2024 0447)  Pain Management Interventions:   pillow support provided   quiet environment facilitated     Problem: Sepsis/Septic Shock  Goal: Absence of Bleeding  Outcome: Progressing  Goal: Blood Glucose Level Within Targeted Range  Outcome: Progressing

## 2024-11-28 NOTE — CONSULTS
Consult received. Chart reviewed.    78 y/o F pt with hx of  HTN, hypothyroidism, ANCA+ microscopic polyangitis w/ associated GN (on rituximab), ESRD on HD who presented with fever and a productive cough and was admitted for sepsis, found to have e coli bacteremia.     Of note, pt did not report urinary symptoms, but UCx also growing e coli. CT with possible rectal mass. GI recommending outpatient workup. RIP and Rcx neg. CXr with possible edema, no consolidations.     ID consulted for: E coli bacteremia abx recs     Fever curve improving. Wbc downtrending.  --switch cefepime to ceftriaxone 2g iv q 24h to target e coli in blood  --f/u repeat BCx to ensure bacteremia clears    ID will follow  Karly Norman MD  Infectious Disease

## 2024-11-28 NOTE — PROGRESS NOTES
Chart reviewed. No emergent need for HD today.  K 3.3. Ordered KCl 20meq po x1.         Mary Jiang MD  [facility]  [phone]

## 2024-11-28 NOTE — NURSING
[facility], [facility]  Nurses Note -- 4 Eyes      11/28/2024       Skin assessed on: Q Shift      [x] No Pressure Injuries Present    [x]Prevention Measures Documented    [] Yes LDA  for Pressure Injury Previously documented     [] Yes New Pressure Injury Discovered   [] LDA for New Pressure Injury Added      Attending RN:  Bernadine Tamez LPN     Second RN:  CRISTINE Cid

## 2024-11-28 NOTE — NURSING
[facility], [facility]  Nurses Note -- 4 Eyes      11/27/2024       Skin assessed on: Q Shift      [x] No Pressure Injuries Present    []Prevention Measures Documented    [] Yes LDA  for Pressure Injury Previously documented     [] Yes New Pressure Injury Discovered   [] LDA for New Pressure Injury Added      Attending RN:  Palak Ayala RN     Second RN:  EMILIANO Colindres

## 2024-11-28 NOTE — ASSESSMENT & PLAN NOTE
UA revealed > 100 WBC with no bacteria.  Urine cultures with Ecoli  Nephrology consulted for inpatient HD needs.  ID on board transitioned to ceftriaxone repeat blood cx pending   Heterosexual

## 2024-11-28 NOTE — SUBJECTIVE & OBJECTIVE
Interval History:     Review of Systems   Constitutional: Negative.    Respiratory: Negative.     Cardiovascular: Negative.    Gastrointestinal: Negative.    Genitourinary: Negative.    Musculoskeletal: Negative.    Neurological: Negative.    Psychiatric/Behavioral: Negative.       Objective:     Vital Signs (Most Recent):  Temp: 98.4 °F (36.9 °C) (11/28/24 1055)  Pulse: 92 (11/28/24 1055)  Resp: 18 (11/28/24 1055)  BP: (!) 148/73 (11/28/24 1055)  SpO2: 97 % (11/28/24 1055) Vital Signs (24h Range):  Temp:  [98.1 °F (36.7 °C)-99.2 °F (37.3 °C)] 98.4 °F (36.9 °C)  Pulse:  [] 92  Resp:  [18] 18  SpO2:  [95 %-99 %] 97 %  BP: (118-148)/(56-73) 148/73     Weight: 64 kg (141 lb 1.5 oz)  Body mass index is 26.66 kg/m².    Intake/Output Summary (Last 24 hours) at 11/28/2024 1326  Last data filed at 11/28/2024 0800  Gross per 24 hour   Intake 120 ml   Output --   Net 120 ml         Physical Exam  Constitutional:       General: She is not in acute distress.     Appearance: She is normal weight.   Cardiovascular:      Rate and Rhythm: Normal rate.      Pulses: Normal pulses.   Pulmonary:      Effort: No respiratory distress.      Breath sounds: Normal breath sounds. No wheezing.   Abdominal:      General: Bowel sounds are normal. There is no distension.      Palpations: Abdomen is soft.      Tenderness: There is no abdominal tenderness.   Musculoskeletal:      Right lower leg: No edema.      Left lower leg: No edema.   Skin:     General: Skin is warm.   Neurological:      Mental Status: She is alert and oriented to person, place, and time. Mental status is at baseline.   Psychiatric:         Mood and Affect: Mood normal.             Significant Labs: All pertinent labs within the past 24 hours have been reviewed.    Significant Imaging: I have reviewed all pertinent imaging results/findings within the past 24 hours.

## 2024-11-28 NOTE — ASSESSMENT & PLAN NOTE
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  UA with >  100 WBC, no bacteria however urine culture with Gram-negative rods  Procalcitonin 2 .09 Chest x-ray with no evidence of focal consolidation  On vanc/cefepime.  DC vanc, transitioned to ceftriaxone  Leukocytosis improved.  CT abdomen with possible 1.6 cm rectal mass.  Last colonoscopy 10/24.  Consulted GI for assistance  repeat blood cultures pending

## 2024-11-28 NOTE — HPI
78 y/o F pt with hx of  HTN, hypothyroidism, ANCA+ microscopic polyangitis w/ associated GN (on rituximab), ESRD on HD who presented with fever and a productive cough and was admitted for sepsis.    Per chart, pt woke on Sunday feeling like she had a cold w/ cough productive of yellow sputum and congestion. Reported a temp of 101.4 on morning of admission.,    In the ED pt was febrile to 102.3. CXR clear.  UA w/ WBC and RBC. SARS CoV2 negative, PCT 2.1    ID consulted for: E coli bacteremia abx recs

## 2024-11-29 PROBLEM — R78.81 GRAM-NEGATIVE BACTEREMIA: Status: ACTIVE | Noted: 2024-11-29

## 2024-11-29 LAB
ALBUMIN SERPL BCP-MCNC: 2.6 G/DL (ref 3.5–5.2)
ANION GAP SERPL CALC-SCNC: 12 MMOL/L (ref 8–16)
BACTERIA BLD CULT: NORMAL
BUN SERPL-MCNC: 38 MG/DL (ref 8–23)
CALCIUM SERPL-MCNC: 8.7 MG/DL (ref 8.7–10.5)
CHLORIDE SERPL-SCNC: 104 MMOL/L (ref 95–110)
CO2 SERPL-SCNC: 23 MMOL/L (ref 23–29)
CREAT SERPL-MCNC: 4.2 MG/DL (ref 0.5–1.4)
EST. GFR  (NO RACE VARIABLE): 10 ML/MIN/1.73 M^2
GLUCOSE SERPL-MCNC: 93 MG/DL (ref 70–110)
PHOSPHATE SERPL-MCNC: 2.4 MG/DL (ref 2.7–4.5)
POTASSIUM SERPL-SCNC: 3.6 MMOL/L (ref 3.5–5.1)
SODIUM SERPL-SCNC: 139 MMOL/L (ref 136–145)

## 2024-11-29 PROCEDURE — 36415 COLL VENOUS BLD VENIPUNCTURE: CPT | Performed by: STUDENT IN AN ORGANIZED HEALTH CARE EDUCATION/TRAINING PROGRAM

## 2024-11-29 PROCEDURE — 80069 RENAL FUNCTION PANEL: CPT | Performed by: STUDENT IN AN ORGANIZED HEALTH CARE EDUCATION/TRAINING PROGRAM

## 2024-11-29 PROCEDURE — 25000003 PHARM REV CODE 250: Performed by: STUDENT IN AN ORGANIZED HEALTH CARE EDUCATION/TRAINING PROGRAM

## 2024-11-29 PROCEDURE — 99223 1ST HOSP IP/OBS HIGH 75: CPT | Mod: ,,, | Performed by: INTERNAL MEDICINE

## 2024-11-29 PROCEDURE — 21400001 HC TELEMETRY ROOM

## 2024-11-29 PROCEDURE — 63600175 PHARM REV CODE 636 W HCPCS: Performed by: STUDENT IN AN ORGANIZED HEALTH CARE EDUCATION/TRAINING PROGRAM

## 2024-11-29 PROCEDURE — 25000003 PHARM REV CODE 250: Performed by: HOSPITALIST

## 2024-11-29 RX ADMIN — CARBAMIDE PEROXIDE 6.5% 5 DROP: 6.5 LIQUID AURICULAR (OTIC) at 09:11

## 2024-11-29 RX ADMIN — GUAIFENESIN AND DEXTROMETHORPHAN HYDROBROMIDE 1 TABLET: 600; 30 TABLET, EXTENDED RELEASE ORAL at 09:11

## 2024-11-29 RX ADMIN — CEFTRIAXONE 2 G: 2 INJECTION, POWDER, FOR SOLUTION INTRAMUSCULAR; INTRAVENOUS at 02:11

## 2024-11-29 RX ADMIN — LEVOTHYROXINE SODIUM 25 MCG: 25 TABLET ORAL at 05:11

## 2024-11-29 RX ADMIN — PANTOPRAZOLE SODIUM 40 MG: 40 TABLET, DELAYED RELEASE ORAL at 09:11

## 2024-11-29 RX ADMIN — SEVELAMER CARBONATE 800 MG: 800 TABLET, FILM COATED ORAL at 04:11

## 2024-11-29 RX ADMIN — TORSEMIDE 100 MG: 10 TABLET ORAL at 09:11

## 2024-11-29 RX ADMIN — ATOVAQUONE 1500 MG: 750 SUSPENSION ORAL at 09:11

## 2024-11-29 RX ADMIN — APIXABAN 5 MG: 5 TABLET, FILM COATED ORAL at 09:11

## 2024-11-29 RX ADMIN — MUPIROCIN: 20 OINTMENT TOPICAL at 09:11

## 2024-11-29 RX ADMIN — MUPIROCIN: 20 OINTMENT TOPICAL at 08:11

## 2024-11-29 RX ADMIN — FLUTICASONE PROPIONATE 100 MCG: 50 SPRAY, METERED NASAL at 09:11

## 2024-11-29 RX ADMIN — APIXABAN 5 MG: 5 TABLET, FILM COATED ORAL at 08:11

## 2024-11-29 RX ADMIN — SEVELAMER CARBONATE 800 MG: 800 TABLET, FILM COATED ORAL at 09:11
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
## 2024-11-29 NOTE — SUBJECTIVE & OBJECTIVE
Interval History:     Review of Systems   Constitutional: Negative.    Respiratory: Negative.     Cardiovascular: Negative.    Gastrointestinal: Negative.    Genitourinary: Negative.    Musculoskeletal: Negative.    Neurological: Negative.    All other systems reviewed and are negative.    Objective:     Vital Signs (Most Recent):  Temp: 98 °F (36.7 °C) (11/29/24 1135)  Pulse: 98 (11/29/24 1151)  Resp: 20 (11/29/24 1135)  BP: (!) 127/59 (11/29/24 1135)  SpO2: 98 % (11/29/24 1135) Vital Signs (24h Range):  Temp:  [97.7 °F (36.5 °C)-98.8 °F (37.1 °C)] 98 °F (36.7 °C)  Pulse:  [] 98  Resp:  [18-20] 20  SpO2:  [93 %-100 %] 98 %  BP: (124-150)/(59-76) 127/59     Weight: 64 kg (141 lb 1.5 oz)  Body mass index is 26.66 kg/m².    Intake/Output Summary (Last 24 hours) at 11/29/2024 1523  Last data filed at 11/29/2024 0822  Gross per 24 hour   Intake 240 ml   Output --   Net 240 ml         Physical Exam  Constitutional:       General: She is not in acute distress.  Cardiovascular:      Rate and Rhythm: Normal rate.      Pulses: Normal pulses.   Pulmonary:      Effort: No respiratory distress.      Breath sounds: Normal breath sounds. No wheezing.   Abdominal:      General: Bowel sounds are normal. There is no distension.      Palpations: Abdomen is soft.      Tenderness: There is no abdominal tenderness.   Musculoskeletal:      Right lower leg: No edema.      Left lower leg: No edema.   Skin:     Findings: No lesion (Left upper extremity AV fistula in place).   Neurological:      Mental Status: She is alert and oriented to person, place, and time. Mental status is at baseline.             Significant Labs: All pertinent labs within the past 24 hours have been reviewed.    Significant Imaging: I have reviewed all pertinent imaging results/findings within the past 24 hours.

## 2024-11-29 NOTE — CLINICAL REVIEW
Nephrology Chart Review      Intake/Output Summary (Last 24 hours) at 11/29/2024 1107  Last data filed at 11/29/2024 0822  Gross per 24 hour   Intake 240 ml   Output --   Net 240 ml       Vitals:    11/28/24 2302 11/29/24 0305 11/29/24 0415 11/29/24 0706   BP: 128/63  124/60 129/71   BP Location: Left arm  Right arm    Patient Position: Lying  Lying Sitting   Pulse: 87 88 85 104   Resp: 18  18 20   Temp: 98.1 °F (36.7 °C)  98.8 °F (37.1 °C) 97.7 °F (36.5 °C)   TempSrc: Oral  Oral Oral   SpO2: 100%  98% 96%   Weight:       Height:           Recent Labs   Lab 11/27/24  0448 11/28/24  0430 11/29/24  0449    138 139   K 3.6 3.3* 3.6    103 104   CO2 26 24 23   BUN 24* 33* 38*   CREATININE 3.9* 4.4* 4.2*   CALCIUM 9.3 8.6* 8.7   PHOS 3.2 2.1* 2.4*       Plan for HD tomorrow    No other related issues identified. Please call Nephrology as needed; We will continue to follow.      Blue Puente MD  Nephrology [facility]

## 2024-11-29 NOTE — ASSESSMENT & PLAN NOTE
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  UA with >  100 WBC, no bacteria however urine culture with Gram-negative rods  Procalcitonin 2 .09 Chest x-ray with no evidence of focal consolidation  On vanc/cefepime.  DC vanc, transitioned to ceftriaxone  Leukocytosis improved.  CT abdomen with possible 1.6 cm rectal mass.  Last colonoscopy 10/24.  Consulted GI for assistance  repeat blood cultures pending  Plan for discharge in a.m. if repeat blood cultures remains negative at 48 hours

## 2024-11-29 NOTE — ASSESSMENT & PLAN NOTE
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    Per chart, pt woke on Sunday feeling like she had a cold w/ cough productive of yellow sputum and congestion. Reported a temp of 101.4 on morning of admission. Denies UTI symptoms.    In the ED pt was febrile to 102.3. CXR clear.  UA w/ WBC and RBC. SARS CoV2 negative, PCT 2.1. BCX and UCx 11/25 growing e coli. Repeat bcx never collected. Fevers have resolved.    Recommendations:  --continue ceftriaxone 2g iv q 24h  --complete a 14 day course abx total.   --transition to cefazolin with HD at discharge. See OPAT note below.

## 2024-11-29 NOTE — NURSING
[facility] [place]  Nurses Note -- 4 Eyes      11/29/2024       Skin assessed on: Q Shift      [x] No Pressure Injuries Present    []Prevention Measures Documented    [] Yes LDA  for Pressure Injury Previously documented     [] Yes New Pressure Injury Discovered   [] LDA for New Pressure Injury Added      Attending RN:  Cristine Triplett RN     Second RN:  CRISTINE Cid

## 2024-11-29 NOTE — PLAN OF CARE
Problem: Adult Inpatient Plan of Care  Goal: Plan of Care Review  Outcome: Progressing  Flowsheets (Taken 11/29/2024 0529)  Plan of Care Reviewed With: patient  Goal: Optimal Comfort and Wellbeing  Outcome: Progressing  Intervention: Monitor Pain and Promote Comfort  Flowsheets (Taken 11/29/2024 0529)  Pain Management Interventions:   pillow support provided   quiet environment facilitated     Problem: Sepsis/Septic Shock  Goal: Optimal Coping  Outcome: Progressing  Goal: Absence of Bleeding  Outcome: Progressing

## 2024-11-29 NOTE — NURSING
[facility] [place]  Nurses Note -- 4 Eyes      11/28/2024       Skin assessed on: Q Shift      [x] No Pressure Injuries Present    []Prevention Measures Documented    [] Yes LDA  for Pressure Injury Previously documented     [] Yes New Pressure Injury Discovered   [] LDA for New Pressure Injury Added      Attending RN:  Palak Ayala RN     Second RN:  EMILIANO Colindres
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
## 2024-11-29 NOTE — PLAN OF CARE
Changes in medical condition or discharge plan:Urine cultures with Ecoli  Nephrology consulted for inpatient HD needs.  ID on board transitioned to ceftriaxone repeat blood cx pending plan for discharge in a.m. if repeat blood cultures remains negative at 48 hours       Does patient need new DME? No    Follow up appts needed: PCP    Medically stable: No    Estimated Discharge Date: 11/30/24 11/29/24 164   Discharge Reassessment   Assessment Type Discharge Planning Reassessment   Did the patient's condition or plan change since previous assessment? No   Discharge Plan discussed with:   (Dr. Garner)   Communicated ANTHONY with patient/caregiver Date not available/Unable to determine   Discharge Plan A Home with family   Discharge Plan B Home with family   DME Needed Upon Discharge  none   Transition of Care Barriers None   Why the patient remains in the hospital Requires continued medical care   Post-Acute Status   Coverage PHN   Discharge Delays None known at this time

## 2024-11-29 NOTE — PROGRESS NOTES
[facility] Medicine  Progress Note    Patient Name: Kristin Goodman  MRN: [id number]  Patient Class: IP- Inpatient   Admission Date: 11/25/2024  Length of Stay: 4 days  Attending Physician: Sophie Garner,*  Primary Care Provider: Josy Keane PA-C        Subjective:     Principal Problem:Severe sepsis        HPI:  80 yo w/ ANCA+ microscopic polyangitis dx 2 yrs ago, w/ associated GN causing ESRD, on HD x 2 yrs but non oliguric  She seen rheum and gets rituximab every 6 mo, just had her infusion last week.  She goes to HD every T/Th/Sat and tolerates it well.  She also has HTN, hypothyroidism, h/o left brachiocephalic vein occlusion (I believe this is why she takes Eliquis?), DJD, VHD, dyslipidemia.  Despite all this she generally feels pretty good and is active - After HD Sat she felt sluggish which is not unusual, but woke up Sunday feeling like she had a cold w/ cough productive of yellow sputum and congestion. She went to be and got up this AM feeling more sluggish and had a temp of 101.4, daughter brought her to ED, temp 102.3, sepsis alert activated, no bolus d/t ESRD, CXR w/o acute disease, urine w/ WBC and RBC but no bacteria, source of infection uncertain. She has had ESBL + e coli UTI and enterococcal bacteremia in the past but it's been awhile.  SARS CoV2 negative, PCT 2.1  Given vanc and cefepime.  Nephrology aware of admit, will likely need HD tomorrow.  BCX done in ED.  HM to admit.  Pt currently lying on stretcher, feeling better, daughter at bedside.      Overview/Hospital Course:  80 yo w/ ANCA+ microscopic polyangitis dx 2 yrs ago, w/ associated GN on rituximab every 6 months, (last infusion last week) ESRD, on HD TTS x 2 yrs , non oliguric, HTN, hypothyroidism, h/o left brachiocephalic vein occlusion on Eliquis who was admitted for sepsis secondary to UTI with E coli bacteremia Chest x-ray with no acute consolidation Respiratory cultures/RIP negative.   tested negative for COVID/flu.  Procalcitonin 2.09 Blood cultures with E coli.  UA revealed > 100 WBC with no bacteria.  Urine cultures with [facility]  Nephrology consulted for inpatient HD needs.  ID on board transitioned to ceftriaxone repeat blood cx pending plan for discharge in a.m. if repeat blood cultures remains negative at 48 hours    Interval History:     Review of Systems   Constitutional: Negative.    Respiratory: Negative.     Cardiovascular: Negative.    Gastrointestinal: Negative.    Genitourinary: Negative.    Musculoskeletal: Negative.    Neurological: Negative.    All other systems reviewed and are negative.    Objective:     Vital Signs (Most Recent):  Temp: 98 °F (36.7 °C) (11/29/24 1135)  Pulse: 98 (11/29/24 1151)  Resp: 20 (11/29/24 1135)  BP: (!) 127/59 (11/29/24 1135)  SpO2: 98 % (11/29/24 1135) Vital Signs (24h Range):  Temp:  [97.7 °F (36.5 °C)-98.8 °F (37.1 °C)] 98 °F (36.7 °C)  Pulse:  [] 98  Resp:  [18-20] 20  SpO2:  [93 %-100 %] 98 %  BP: (124-150)/(59-76) 127/59     Weight: 64 kg (141 lb 1.5 oz)  Body mass index is 26.66 kg/m².    Intake/Output Summary (Last 24 hours) at 11/29/2024 1523  Last data filed at 11/29/2024 0822  Gross per 24 hour   Intake 240 ml   Output --   Net 240 ml         Physical Exam  Constitutional:       General: She is not in acute distress.  Cardiovascular:      Rate and Rhythm: Normal rate.      Pulses: Normal pulses.   Pulmonary:      Effort: No respiratory distress.      Breath sounds: Normal breath sounds. No wheezing.   Abdominal:      General: Bowel sounds are normal. There is no distension.      Palpations: Abdomen is soft.      Tenderness: There is no abdominal tenderness.   Musculoskeletal:      Right lower leg: No edema.      Left lower leg: No edema.   Skin:     Findings: No lesion (Left upper extremity AV fistula in place).   Neurological:      Mental Status: She is alert and oriented to person, place, and time. Mental status is at baseline.              Significant Labs: All pertinent labs within the past 24 hours have been reviewed.    Significant Imaging: I have reviewed all pertinent imaging results/findings within the past 24 hours.    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  UA with >  100 WBC, no bacteria however urine culture with Gram-negative rods  Procalcitonin 2 .09 Chest x-ray with no evidence of focal consolidation  On vanc/cefepime.  DC vanc, transitioned to ceftriaxone  Leukocytosis improved.  CT abdomen with possible 1.6 cm rectal mass.  Last colonoscopy 10/24.  Consulted GI for assistance  repeat blood cultures pending  Plan for discharge in a.m. if repeat blood cultures remains negative at 48 hours      UTI (urinary tract infection)    UA revealed > 100 WBC with no bacteria.  Urine cultures with Ecoli  Nephrology consulted for inpatient HD needs.  ID on board transitioned to ceftriaxone repeat blood cx pending    Advance care planning  Pt is Full Code, has Living Will and would not want long term life support  Daughter aware    ACP time spent 5 minute    ESRD (end stage renal disease)  Nephrology on board for inpatient HD needs    Current long-term use of anticoagulant medication with history of deep venous thrombosis (DVT)  Continue Eliquis     Anemia due to chronic kidney disease  Hemoglobin Appears stable      Antineutrophilic cytoplasmic antibody (ANCA) vasculitis  Undergoes rituximab maintenance w/ Dr Palacio q 6 mo  Is on atovaquone for PJP prophylaxis presumably  Relatively immune compromised       Chronic diastolic heart failure    Echo with EF 55-60% G1 DD.  Appears euvolemic  Sees Dr Mills routinely        Primary hypertension  Blood pressure at goal.  Holding home antihypertensives in the setting of sepsis    Hypothyroid  TSH 0.6  Continue levothyroxine 25 mcg QD          VTE Risk Mitigation  (From admission, onward)           Ordered     apixaban tablet 5 mg  2 times daily         11/25/24 1731     Reason for No Pharmacological VTE Prophylaxis  Once        Question:  Reasons:  Answer:  Risk of Bleeding    11/25/24 1558     IP VTE HIGH RISK PATIENT  Once         11/25/24 1558     Place sequential compression device  Until discontinued         11/25/24 1558                    Discharge Planning   ANTHONY: 11/28/2024     Code Status: Full Code   Is the patient medically ready for discharge?:     Reason for patient still in hospital (select all that apply): Patient trending condition and Treatment  Discharge Plan A: Home with family                  Sophie Garner MD  Department of Hospital Medicine   [facility] [place] - Med Surg

## 2024-11-30 LAB
ALBUMIN SERPL BCP-MCNC: 2.8 G/DL (ref 3.5–5.2)
ANION GAP SERPL CALC-SCNC: 15 MMOL/L (ref 8–16)
BUN SERPL-MCNC: 41 MG/DL (ref 8–23)
CALCIUM SERPL-MCNC: 9.6 MG/DL (ref 8.7–10.5)
CHLORIDE SERPL-SCNC: 104 MMOL/L (ref 95–110)
CO2 SERPL-SCNC: 20 MMOL/L (ref 23–29)
CREAT SERPL-MCNC: 4.3 MG/DL (ref 0.5–1.4)
EST. GFR  (NO RACE VARIABLE): 10 ML/MIN/1.73 M^2
GLUCOSE SERPL-MCNC: 81 MG/DL (ref 70–110)
PHOSPHATE SERPL-MCNC: 3.4 MG/DL (ref 2.7–4.5)
POTASSIUM SERPL-SCNC: 4.1 MMOL/L (ref 3.5–5.1)
SODIUM SERPL-SCNC: 139 MMOL/L (ref 136–145)

## 2024-11-30 PROCEDURE — 25000003 PHARM REV CODE 250: Performed by: STUDENT IN AN ORGANIZED HEALTH CARE EDUCATION/TRAINING PROGRAM

## 2024-11-30 PROCEDURE — 99232 SBSQ HOSP IP/OBS MODERATE 35: CPT | Mod: FS,,, | Performed by: STUDENT IN AN ORGANIZED HEALTH CARE EDUCATION/TRAINING PROGRAM

## 2024-11-30 PROCEDURE — 63600175 PHARM REV CODE 636 W HCPCS: Performed by: STUDENT IN AN ORGANIZED HEALTH CARE EDUCATION/TRAINING PROGRAM

## 2024-11-30 PROCEDURE — 87040 BLOOD CULTURE FOR BACTERIA: CPT | Mod: 59 | Performed by: STUDENT IN AN ORGANIZED HEALTH CARE EDUCATION/TRAINING PROGRAM

## 2024-11-30 PROCEDURE — 21400001 HC TELEMETRY ROOM

## 2024-11-30 PROCEDURE — 80100014 HC HEMODIALYSIS 1:1

## 2024-11-30 PROCEDURE — 25000003 PHARM REV CODE 250: Performed by: HOSPITALIST

## 2024-11-30 PROCEDURE — 80069 RENAL FUNCTION PANEL: CPT | Performed by: STUDENT IN AN ORGANIZED HEALTH CARE EDUCATION/TRAINING PROGRAM

## 2024-11-30 PROCEDURE — 36415 COLL VENOUS BLD VENIPUNCTURE: CPT | Performed by: STUDENT IN AN ORGANIZED HEALTH CARE EDUCATION/TRAINING PROGRAM

## 2024-11-30 RX ADMIN — SEVELAMER CARBONATE 800 MG: 800 TABLET, FILM COATED ORAL at 04:11

## 2024-11-30 RX ADMIN — CEFTRIAXONE 2 G: 2 INJECTION, POWDER, FOR SOLUTION INTRAMUSCULAR; INTRAVENOUS at 01:11

## 2024-11-30 RX ADMIN — APIXABAN 5 MG: 5 TABLET, FILM COATED ORAL at 09:11

## 2024-11-30 RX ADMIN — PANTOPRAZOLE SODIUM 40 MG: 40 TABLET, DELAYED RELEASE ORAL at 01:11

## 2024-11-30 RX ADMIN — TORSEMIDE 100 MG: 10 TABLET ORAL at 01:11

## 2024-11-30 RX ADMIN — CARBAMIDE PEROXIDE 6.5% 5 DROP: 6.5 LIQUID AURICULAR (OTIC) at 09:11

## 2024-11-30 RX ADMIN — SEVELAMER CARBONATE 800 MG: 800 TABLET, FILM COATED ORAL at 01:11

## 2024-11-30 RX ADMIN — ATOVAQUONE 1500 MG: 750 SUSPENSION ORAL at 01:11

## 2024-11-30 RX ADMIN — LEVOTHYROXINE SODIUM 25 MCG: 25 TABLET ORAL at 06:11

## 2024-11-30 RX ADMIN — GUAIFENESIN AND DEXTROMETHORPHAN HYDROBROMIDE 1 TABLET: 600; 30 TABLET, EXTENDED RELEASE ORAL at 09:11

## 2024-11-30 NOTE — NURSING
Repeat blood culture ordered.. Pt known to be a hard stick. Phlebotomist able to get 1 set of Blood cx this morning.

## 2024-11-30 NOTE — PROGRESS NOTES
[facility]  Progress Note    Patient Name: Kristin Goodman  MRN: [id number]  Patient Class: IP- Inpatient   Admission Date: 11/25/2024  Length of Stay: 5 days  Attending Physician: Sophie Garner,*  Primary Care Provider: Josy Keane PA-C        Subjective:     Principal Problem:Severe sepsis        HPI:  78 yo w/ ANCA+ microscopic polyangitis dx 2 yrs ago, w/ associated GN causing ESRD, on HD x 2 yrs but non oliguric  She seen rheum and gets rituximab every 6 mo, just had her infusion last week.  She goes to HD every T/Th/Sat and tolerates it well.  She also has HTN, hypothyroidism, h/o left brachiocephalic vein occlusion (I believe this is why she takes Eliquis?), DJD, VHD, dyslipidemia.  Despite all this she generally feels pretty good and is active - After HD Sat she felt sluggish which is not unusual, but woke up Sunday feeling like she had a cold w/ cough productive of yellow sputum and congestion. She went to be and got up this AM feeling more sluggish and had a temp of 101.4, daughter brought her to ED, temp 102.3, sepsis alert activated, no bolus d/t ESRD, CXR w/o acute disease, urine w/ WBC and RBC but no bacteria, source of infection uncertain. She has had ESBL + e coli UTI and enterococcal bacteremia in the past but it's been awhile.  SARS CoV2 negative, PCT 2.1  Given vanc and cefepime.  Nephrology aware of admit, will likely need HD tomorrow.  BCX done in ED.  HM to admit.  Pt currently lying on stretcher, feeling better, daughter at bedside.      Overview/Hospital Course:  78 yo w/ ANCA+ microscopic polyangitis dx 2 yrs ago, w/ associated GN on rituximab every 6 months, (last infusion last week) ESRD, on HD TTS x 2 yrs , non oliguric, HTN, hypothyroidism, h/o left brachiocephalic vein occlusion on Eliquis who was admitted for sepsis secondary to UTI with E coli bacteremia Chest x-ray with no acute consolidation Respiratory cultures/RIP negative.   tested negative for COVID/flu.  Procalcitonin 2.09 Blood cultures with E coli.  UA revealed > 100 WBC with no bacteria.  Urine cultures with [facility]  Nephrology consulted for inpatient HD needs.  ID on board transitioned to ceftriaxone repeat blood cx were never collected by lab, reordered repeat blood cx to ensure clearance of bacteremia    Interval History: patient reports feeling better this morning seen eating breakfast    Review of Systems   Constitutional: Negative.    Respiratory: Negative.     Cardiovascular: Negative.    Gastrointestinal: Negative.    Genitourinary: Negative.    Musculoskeletal: Negative.    Neurological: Negative.    Psychiatric/Behavioral: Negative.       Objective:     Vital Signs (Most Recent):  Temp: 98.6 °F (37 °C) (11/30/24 0751)  Pulse: 82 (11/30/24 0751)  Resp: 16 (11/30/24 0751)  BP: 136/73 (11/30/24 0751)  SpO2: 96 % (11/30/24 0751) Vital Signs (24h Range):  Temp:  [98 °F (36.7 °C)-98.7 °F (37.1 °C)] 98.6 °F (37 °C)  Pulse:  [77-98] 82  Resp:  [16-20] 16  SpO2:  [94 %-99 %] 96 %  BP: (113-145)/(53-73) 136/73     Weight: 64 kg (141 lb 1.5 oz)  Body mass index is 26.66 kg/m².    Intake/Output Summary (Last 24 hours) at 11/30/2024 0913  Last data filed at 11/29/2024 1757  Gross per 24 hour   Intake 360 ml   Output --   Net 360 ml         Physical Exam  Constitutional:       General: She is not in acute distress.  Cardiovascular:      Rate and Rhythm: Normal rate.      Pulses: Normal pulses.   Pulmonary:      Effort: No respiratory distress.      Breath sounds: Normal breath sounds. No wheezing.   Abdominal:      General: Bowel sounds are normal. There is no distension.      Palpations: Abdomen is soft.      Tenderness: There is no abdominal tenderness.   Musculoskeletal:      Right lower leg: No edema.      Left lower leg: No edema.   Neurological:      Mental Status: She is alert and oriented to person, place, and time. Mental status is at baseline.     DEBBIE CAGE noted         Significant Labs: All pertinent labs within the past 24 hours have been reviewed.    Significant Imaging: I have reviewed all pertinent imaging results/findings within the past 24 hours.      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  UA with >  100 WBC, no bacteria however urine culture with Gram-negative rods  Procalcitonin 2 .09 Chest x-ray with no evidence of focal consolidation  On vanc/cefepime.  DC vanc, transitioned to ceftriaxone  Leukocytosis improved.  CT abdomen with possible 1.6 cm rectal mass.  Last colonoscopy 10/24.  Consulted GI for assistance  Rec repeat c scope in 4 weeks        UTI (urinary tract infection)    UA revealed > 100 WBC with no bacteria.  Urine cultures with Ecoli  Nephrology consulted for inpatient HD needs.  ID on board transitioned to ceftriaxone were never collected by lab, reordered repeat blood cx to ensure clearance of bacteremia  ID recommending cefazolin with HD at d/c    Advance care planning  Pt is Full Code, has Living Will and would not want long term life support  Daughter aware    ACP time spent 5 minute    ESRD (end stage renal disease)  Nephrology on board for inpatient HD needs    Current long-term use of anticoagulant medication with history of deep venous thrombosis (DVT)  Continue Eliquis     Anemia due to chronic kidney disease  Hemoglobin Appears stable      Antineutrophilic cytoplasmic antibody (ANCA) vasculitis  Undergoes rituximab maintenance w/ Dr Palacio q 6 mo  Is on atovaquone for PJP prophylaxis presumably  Relatively immune compromised       Chronic diastolic heart failure    Echo with EF 55-60% G1 DD.  Appears euvolemic  Sees Dr Mills routinely        Primary hypertension  Blood pressure at goal.  Holding home antihypertensives in the setting of sepsis    Hypothyroid  TSH 0.6  Continue levothyroxine 25 mcg  QD          VTE Risk Mitigation (From admission, onward)           Ordered     apixaban tablet 5 mg  2 times daily         11/25/24 1731     Reason for No Pharmacological VTE Prophylaxis  Once        Question:  Reasons:  Answer:  Risk of Bleeding    11/25/24 1558     IP VTE HIGH RISK PATIENT  Once         11/25/24 1558     Place sequential compression device  Until discontinued         11/25/24 1558                    Discharge Planning   ANTHONY: 11/28/2024     Code Status: Full Code   Is the patient medically ready for discharge?:     Reason for patient still in hospital (select all that apply): Patient trending condition and Treatment  Discharge Plan A: Home with family   Discharge Delays: None known at this time              Sophie Garner MD  Department of Hospital Medicine   [facility]

## 2024-11-30 NOTE — PROGRESS NOTES
[facility]  Nephrology  Progress Note    Patient Name: Kristin Goodman  MRN: [id number]  Admission Date: 11/25/2024  Hospital Length of Stay: 5 days  Attending Provider: Sophie Garner,*   Primary Care Physician: Josy Keane PA-C  Principal Problem:Severe sepsis    Subjective:     HPI: 79 year old female with a history of microscopic polyangiitis (MPO positive) with mixed SLE picture on rituximab with rheumatology (Last dose 11/17/2024), on HD (Tuesdays and Saturdays), HTN presents with fevers/chills from home. She had a fever to 102.3 in the ED and was admitted to  for sepsis workup.     Outpatient ESRD  Hemodialysis  Outpatient nephrologist: Dr. Harper  Outpatient center: [facility]  Dialysis schedule: Tuesdays/saturdays  Last treatment: 11/23  Anuric: makes urine - on torsemide 100 QD  Dry weight: 63.4  Access: Left AVF      Interval History: UOP 4x. NAEON. Pt seen during HD. No UF during HD, due to muscle cramping. Decreased appetite today, no other complaints.    Review of patient's allergies indicates:   Allergen Reactions    Ampicillin     Lactose Diarrhea    Peaches [peach (prunus persica)] Other (See Comments)     Pt unable to state type of reaction. Information obtained from daughter who states she was informed of allergy from patient.    Penicillins      Other reaction(s): Hives, anaphylaxis    Sulfa (sulfonamide antibiotics) Rash and Hives     Current Facility-Administered Medications   Medication Frequency    acetaminophen tablet 650 mg Q4H PRN    acetaminophen tablet 650 mg Q8H PRN    albuterol sulfate nebulizer solution 2.5 mg Q6H PRN    apixaban tablet 5 mg BID    atovaquone 750 mg/5 mL oral liquid 1,500 mg Daily    carbamide peroxide 6.5 % otic solution 5 drop BID    cefTRIAXone injection 2 g Q24H    dextromethorphan-guaiFENesin  mg per 12 hr tablet 1 tablet BID    dextrose 10% bolus 125 mL 125 mL PRN    dextrose 10% bolus 250 mL 250 mL PRN    epoetin  hira-epbx injection 3,200 Units Q7 Days    fluticasone propionate 50 mcg/actuation nasal spray 100 mcg Daily    glucagon (human recombinant) injection 1 mg PRN    glucose chewable tablet 16 g PRN    glucose chewable tablet 24 g PRN    guaiFENesin 100 mg/5 ml syrup 400 mg Q4H PRN    hydrALAZINE injection 10 mg Q4H PRN    HYDROcodone-acetaminophen 5-325 mg per tablet 1 tablet Q6H PRN    levothyroxine tablet 25 mcg Before breakfast    melatonin tablet 6 mg Nightly PRN    mupirocin 2 % ointment BID    naloxone 0.4 mg/mL injection 0.02 mg PRN    pantoprazole EC tablet 40 mg Daily    polyethylene glycol packet 17 g Daily PRN    prochlorperazine injection Soln 5 mg Q6H PRN    QUEtiapine tablet 25 mg Nightly PRN    sevelamer carbonate tablet 800 mg TID WM    simethicone chewable tablet 80 mg QID PRN    sodium chloride 0.9% flush 10 mL Q12H PRN    torsemide tablet 100 mg Daily       Objective:     Vital Signs (Most Recent):  Temp: 98.4 °F (36.9 °C) (11/30/24 1629)  Pulse: 89 (11/30/24 1637)  Resp: 16 (11/30/24 1629)  BP: 135/76 (11/30/24 1629)  SpO2: 98 % (11/30/24 1637) Vital Signs (24h Range):  Temp:  [98.3 °F (36.8 °C)-98.7 °F (37.1 °C)] 98.4 °F (36.9 °C)  Pulse:  [81-99] 89  Resp:  [16-18] 16  SpO2:  [96 %-99 %] 98 %  BP: (113-148)/(53-79) 135/76     Weight: 64 kg (141 lb 1.5 oz) (11/25/24 1940)  Body mass index is 26.66 kg/m².  Body surface area is 1.66 meters squared.    I/O last 3 completed shifts:  In: 600 [P.O.:600]  Out: -      Physical Exam  Vitals and nursing note reviewed.   Constitutional:       General: She is not in acute distress.     Appearance: Normal appearance.   HENT:      Head: Normocephalic.      Mouth/Throat:      Mouth: Mucous membranes are moist.   Eyes:      Extraocular Movements: Extraocular movements intact.      Conjunctiva/sclera: Conjunctivae normal.      Pupils: Pupils are equal, round, and reactive to light.   Cardiovascular:      Rate and Rhythm: Normal rate.      Pulses: Normal pulses.       Heart sounds: Normal heart sounds.   Abdominal:      Palpations: Abdomen is soft.   Musculoskeletal:         General: Normal range of motion.      Cervical back: Normal range of motion.      Right lower leg: No edema.      Left lower leg: No edema.   Skin:     General: Skin is warm.   Neurological:      General: No focal deficit present.      Mental Status: She is alert.   Psychiatric:         Mood and Affect: Mood normal.        Significant Labs:  CMP:   Recent Labs   Lab 11/25/24  1219 11/26/24  0404 11/30/24  0609   *   < > 81   CALCIUM 9.1   < > 9.6   ALBUMIN 3.2*   < > 2.8*   PROT 6.9  --   --       < > 139   K 4.0   < > 4.1   CO2 27   < > 20*      < > 104   BUN 49*   < > 41*   CREATININE 5.4*   < > 4.3*   ALKPHOS 68  --   --    ALT 9*  --   --    AST 17  --   --    BILITOT 0.4  --   --     < > = values in this interval not displayed.     All labs within the past 24 hours have been reviewed.     Significant Imaging:    Assessment/Plan:     Renal/  ESRD (end stage renal disease)  ESRD - dialysis twice weekly - Tuesday and Saturdays    Plan/Recommendation  -HD today  -continue home torsemide 100 QD  -follow up RFP daily  -Keep MAP > 65  -Keep hemoglobin > 7  -Strict ins and outs  -Avoid nephrotoxic agents if possible and renally dose medications  -Avoid drastic hemodynamic changes if possible    #MPO positive ANCA vasculitis  Continue home pred       #Anemia  Hemoglobin   Date Value Ref Range Status   11/27/2024 10.0 (L) 12.0 - 16.0 g/dL Final     Iron   Date Value Ref Range Status   10/04/2024 59 30 - 160 ug/dL Final     Transferrin   Date Value Ref Range Status   10/04/2024 209 200 - 375 mg/dL Final     TIBC   Date Value Ref Range Status   10/04/2024 309 250 - 450 ug/dL Final     Saturated Iron   Date Value Ref Range Status   10/04/2024 19 (L) 20 - 50 % Final     Ferritin   Date Value Ref Range Status   10/04/2024 1,958 (H) 20.0 - 300.0 ng/mL Final   Hold iron replacement while awaiting  sepsis workup  MAXIM weekly    #Secondary hyperparathyroidism  Calcium   Date Value Ref Range Status   11/30/2024 9.6 8.7 - 10.5 mg/dL Final     Phosphorus   Date Value Ref Range Status   11/30/2024 3.4 2.7 - 4.5 mg/dL Final     Obtain PTH        Thank you for your consult. I will follow-up with patient. Please contact us if you have any additional questions.    KYARA Medina  Nephrology  [facility]

## 2024-11-30 NOTE — ASSESSMENT & PLAN NOTE
UA revealed > 100 WBC with no bacteria.  Urine cultures with Ecoli  Nephrology consulted for inpatient HD needs.  ID on board transitioned to ceftriaxone were never collected by lab, reordered repeat blood cx to ensure clearance of bacteremia  ID recommending cefazolin with HD at d/c

## 2024-11-30 NOTE — SUBJECTIVE & OBJECTIVE
Interval History: UOP 4x. NAEON. Pt seen during HD. No UF during HD, due to muscle cramping. Decreased appetite today, no other complaints.    Review of patient's allergies indicates:   Allergen Reactions    Ampicillin     Lactose Diarrhea    Peaches [peach (prunus persica)] Other (See Comments)     Pt unable to state type of reaction. Information obtained from daughter who states she was informed of allergy from patient.    Penicillins      Other reaction(s): Hives, anaphylaxis    Sulfa (sulfonamide antibiotics) Rash and Hives     Current Facility-Administered Medications   Medication Frequency    acetaminophen tablet 650 mg Q4H PRN    acetaminophen tablet 650 mg Q8H PRN    albuterol sulfate nebulizer solution 2.5 mg Q6H PRN    apixaban tablet 5 mg BID    atovaquone 750 mg/5 mL oral liquid 1,500 mg Daily    carbamide peroxide 6.5 % otic solution 5 drop BID    cefTRIAXone injection 2 g Q24H    dextromethorphan-guaiFENesin  mg per 12 hr tablet 1 tablet BID    dextrose 10% bolus 125 mL 125 mL PRN    dextrose 10% bolus 250 mL 250 mL PRN    epoetin hira-epbx injection 3,200 Units Q7 Days    fluticasone propionate 50 mcg/actuation nasal spray 100 mcg Daily    glucagon (human recombinant) injection 1 mg PRN    glucose chewable tablet 16 g PRN    glucose chewable tablet 24 g PRN    guaiFENesin 100 mg/5 ml syrup 400 mg Q4H PRN    hydrALAZINE injection 10 mg Q4H PRN    HYDROcodone-acetaminophen 5-325 mg per tablet 1 tablet Q6H PRN    levothyroxine tablet 25 mcg Before breakfast    melatonin tablet 6 mg Nightly PRN    mupirocin 2 % ointment BID    naloxone 0.4 mg/mL injection 0.02 mg PRN    pantoprazole EC tablet 40 mg Daily    polyethylene glycol packet 17 g Daily PRN    prochlorperazine injection Soln 5 mg Q6H PRN    QUEtiapine tablet 25 mg Nightly PRN    sevelamer carbonate tablet 800 mg TID WM    simethicone chewable tablet 80 mg QID PRN    sodium chloride 0.9% flush 10 mL Q12H PRN    torsemide tablet 100 mg Daily        Objective:     Vital Signs (Most Recent):  Temp: 98.4 °F (36.9 °C) (11/30/24 1629)  Pulse: 89 (11/30/24 1637)  Resp: 16 (11/30/24 1629)  BP: 135/76 (11/30/24 1629)  SpO2: 98 % (11/30/24 1637) Vital Signs (24h Range):  Temp:  [98.3 °F (36.8 °C)-98.7 °F (37.1 °C)] 98.4 °F (36.9 °C)  Pulse:  [81-99] 89  Resp:  [16-18] 16  SpO2:  [96 %-99 %] 98 %  BP: (113-148)/(53-79) 135/76     Weight: 64 kg (141 lb 1.5 oz) (11/25/24 1940)  Body mass index is 26.66 kg/m².  Body surface area is 1.66 meters squared.    I/O last 3 completed shifts:  In: 600 [P.O.:600]  Out: -      Physical Exam  Vitals and nursing note reviewed.   Constitutional:       General: She is not in acute distress.     Appearance: Normal appearance.   HENT:      Head: Normocephalic.      Mouth/Throat:      Mouth: Mucous membranes are moist.   Eyes:      Extraocular Movements: Extraocular movements intact.      Conjunctiva/sclera: Conjunctivae normal.      Pupils: Pupils are equal, round, and reactive to light.   Cardiovascular:      Rate and Rhythm: Normal rate.      Pulses: Normal pulses.      Heart sounds: Normal heart sounds.   Abdominal:      Palpations: Abdomen is soft.   Musculoskeletal:         General: Normal range of motion.      Cervical back: Normal range of motion.      Right lower leg: No edema.      Left lower leg: No edema.   Skin:     General: Skin is warm.   Neurological:      General: No focal deficit present.      Mental Status: She is alert.   Psychiatric:         Mood and Affect: Mood normal.        Significant Labs:  CMP:   Recent Labs   Lab 11/25/24  1219 11/26/24  0404 11/30/24  0609   *   < > 81   CALCIUM 9.1   < > 9.6   ALBUMIN 3.2*   < > 2.8*   PROT 6.9  --   --       < > 139   K 4.0   < > 4.1   CO2 27   < > 20*      < > 104   BUN 49*   < > 41*   CREATININE 5.4*   < > 4.3*   ALKPHOS 68  --   --    ALT 9*  --   --    AST 17  --   --    BILITOT 0.4  --   --     < > = values in this interval not displayed.     All  labs within the past 24 hours have been reviewed.     Significant Imaging:

## 2024-11-30 NOTE — ASSESSMENT & PLAN NOTE
ESRD - dialysis twice weekly - Tuesday and Saturdays    Plan/Recommendation  -HD today  -continue home torsemide 100 QD  -follow up RFP daily  -Keep MAP > 65  -Keep hemoglobin > 7  -Strict ins and outs  -Avoid nephrotoxic agents if possible and renally dose medications  -Avoid drastic hemodynamic changes if possible    #MPO positive ANCA vasculitis  Continue home pred       #Anemia  Hemoglobin   Date Value Ref Range Status   11/27/2024 10.0 (L) 12.0 - 16.0 g/dL Final     Iron   Date Value Ref Range Status   10/04/2024 59 30 - 160 ug/dL Final     Transferrin   Date Value Ref Range Status   10/04/2024 209 200 - 375 mg/dL Final     TIBC   Date Value Ref Range Status   10/04/2024 309 250 - 450 ug/dL Final     Saturated Iron   Date Value Ref Range Status   10/04/2024 19 (L) 20 - 50 % Final     Ferritin   Date Value Ref Range Status   10/04/2024 1,958 (H) 20.0 - 300.0 ng/mL Final   Hold iron replacement while awaiting sepsis workup  MAXIM weekly    #Secondary hyperparathyroidism  Calcium   Date Value Ref Range Status   11/30/2024 9.6 8.7 - 10.5 mg/dL Final     Phosphorus   Date Value Ref Range Status   11/30/2024 3.4 2.7 - 4.5 mg/dL Final     Obtain PTH

## 2024-11-30 NOTE — SUBJECTIVE & OBJECTIVE
Interval History: patient reports feeling better this morning seen eating breakfast    Review of Systems   Constitutional: Negative.    Respiratory: Negative.     Cardiovascular: Negative.    Gastrointestinal: Negative.    Genitourinary: Negative.    Musculoskeletal: Negative.    Neurological: Negative.    Psychiatric/Behavioral: Negative.       Objective:     Vital Signs (Most Recent):  Temp: 98.6 °F (37 °C) (11/30/24 0751)  Pulse: 82 (11/30/24 0751)  Resp: 16 (11/30/24 0751)  BP: 136/73 (11/30/24 0751)  SpO2: 96 % (11/30/24 0751) Vital Signs (24h Range):  Temp:  [98 °F (36.7 °C)-98.7 °F (37.1 °C)] 98.6 °F (37 °C)  Pulse:  [77-98] 82  Resp:  [16-20] 16  SpO2:  [94 %-99 %] 96 %  BP: (113-145)/(53-73) 136/73     Weight: 64 kg (141 lb 1.5 oz)  Body mass index is 26.66 kg/m².    Intake/Output Summary (Last 24 hours) at 11/30/2024 0913  Last data filed at 11/29/2024 1757  Gross per 24 hour   Intake 360 ml   Output --   Net 360 ml         Physical Exam  Constitutional:       General: She is not in acute distress.  Cardiovascular:      Rate and Rhythm: Normal rate.      Pulses: Normal pulses.   Pulmonary:      Effort: No respiratory distress.      Breath sounds: Normal breath sounds. No wheezing.   Abdominal:      General: Bowel sounds are normal. There is no distension.      Palpations: Abdomen is soft.      Tenderness: There is no abdominal tenderness.   Musculoskeletal:      Right lower leg: No edema.      Left lower leg: No edema.   Neurological:      Mental Status: She is alert and oriented to person, place, and time. Mental status is at baseline.     DEBBIE CAGE noted        Significant Labs: All pertinent labs within the past 24 hours have been reviewed.    Significant Imaging: I have reviewed all pertinent imaging results/findings within the past 24 hours.

## 2024-11-30 NOTE — NURSING
Patient returned to floor from dialysis. 300 ml removed and patient experienced cramping during the procedure. NAD noted. Safety precautions maintained. Plan of care ongoing.

## 2024-11-30 NOTE — NURSING
[facility] [place]  Nurses Note -- 4 Eyes      11/30/2024       Skin assessed on: Q Shift      [x] No Pressure Injuries Present    []Prevention Measures Documented    [] Yes LDA  for Pressure Injury Previously documented     [] Yes New Pressure Injury Discovered   [] LDA for New Pressure Injury Added      Attending RN:  Jacqueline Dupree LPN     Second RN:  Davonte Rondon LPN

## 2024-11-30 NOTE — PLAN OF CARE
Problem: Adult Inpatient Plan of Care  Goal: Optimal Comfort and Wellbeing  Outcome: Progressing     Problem: Sepsis/Septic Shock  Goal: Optimal Coping  Outcome: Progressing     Problem: Hemodialysis  Goal: Safe, Effective Therapy Delivery  Outcome: Progressing

## 2024-11-30 NOTE — ASSESSMENT & PLAN NOTE
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  UA with >  100 WBC, no bacteria however urine culture with Gram-negative rods  Procalcitonin 2 .09 Chest x-ray with no evidence of focal consolidation  On vanc/cefepime.  DC vanc, transitioned to ceftriaxone  Leukocytosis improved.  CT abdomen with possible 1.6 cm rectal mass.  Last colonoscopy 10/24.  Consulted GI for assistance  Rec repeat c scope in 4 weeks

## 2024-12-01 VITALS
RESPIRATION RATE: 18 BRPM | HEART RATE: 82 BPM | OXYGEN SATURATION: 99 % | DIASTOLIC BLOOD PRESSURE: 71 MMHG | HEIGHT: 61 IN | WEIGHT: 141.13 LBS | TEMPERATURE: 98 F | BODY MASS INDEX: 26.65 KG/M2 | SYSTOLIC BLOOD PRESSURE: 134 MMHG

## 2024-12-01 LAB
ALBUMIN SERPL BCP-MCNC: 2.9 G/DL (ref 3.5–5.2)
ANION GAP SERPL CALC-SCNC: 14 MMOL/L (ref 8–16)
BUN SERPL-MCNC: 18 MG/DL (ref 8–23)
CALCIUM SERPL-MCNC: 9.4 MG/DL (ref 8.7–10.5)
CHLORIDE SERPL-SCNC: 97 MMOL/L (ref 95–110)
CO2 SERPL-SCNC: 26 MMOL/L (ref 23–29)
CREAT SERPL-MCNC: 3.9 MG/DL (ref 0.5–1.4)
ERYTHROCYTE [DISTWIDTH] IN BLOOD BY AUTOMATED COUNT: 15 % (ref 11.5–14.5)
EST. GFR  (NO RACE VARIABLE): 11 ML/MIN/1.73 M^2
GLUCOSE SERPL-MCNC: 85 MG/DL (ref 70–110)
HCT VFR BLD AUTO: 31 % (ref 37–48.5)
HGB BLD-MCNC: 9.4 G/DL (ref 12–16)
MCH RBC QN AUTO: 28.7 PG (ref 27–31)
MCHC RBC AUTO-ENTMCNC: 30.3 G/DL (ref 32–36)
MCV RBC AUTO: 95 FL (ref 82–98)
PHOSPHATE SERPL-MCNC: 3.4 MG/DL (ref 2.7–4.5)
PLATELET # BLD AUTO: 267 K/UL (ref 150–450)
PMV BLD AUTO: 10 FL (ref 9.2–12.9)
POTASSIUM SERPL-SCNC: 3.3 MMOL/L (ref 3.5–5.1)
RBC # BLD AUTO: 3.28 M/UL (ref 4–5.4)
SODIUM SERPL-SCNC: 137 MMOL/L (ref 136–145)
WBC # BLD AUTO: 8.94 K/UL (ref 3.9–12.7)

## 2024-12-01 PROCEDURE — 80069 RENAL FUNCTION PANEL: CPT | Performed by: STUDENT IN AN ORGANIZED HEALTH CARE EDUCATION/TRAINING PROGRAM

## 2024-12-01 PROCEDURE — 36415 COLL VENOUS BLD VENIPUNCTURE: CPT | Performed by: STUDENT IN AN ORGANIZED HEALTH CARE EDUCATION/TRAINING PROGRAM

## 2024-12-01 PROCEDURE — 25000003 PHARM REV CODE 250: Performed by: STUDENT IN AN ORGANIZED HEALTH CARE EDUCATION/TRAINING PROGRAM

## 2024-12-01 PROCEDURE — 25000003 PHARM REV CODE 250: Performed by: HOSPITALIST

## 2024-12-01 PROCEDURE — 99232 SBSQ HOSP IP/OBS MODERATE 35: CPT | Mod: FS,,, | Performed by: STUDENT IN AN ORGANIZED HEALTH CARE EDUCATION/TRAINING PROGRAM

## 2024-12-01 PROCEDURE — 85027 COMPLETE CBC AUTOMATED: CPT | Performed by: STUDENT IN AN ORGANIZED HEALTH CARE EDUCATION/TRAINING PROGRAM

## 2024-12-01 PROCEDURE — 63600175 PHARM REV CODE 636 W HCPCS: Performed by: HOSPITALIST

## 2024-12-01 RX ORDER — AMLODIPINE BESYLATE 10 MG/1
5 TABLET ORAL DAILY
Start: 2024-12-01 | End: 2024-12-31

## 2024-12-01 RX ADMIN — TORSEMIDE 100 MG: 10 TABLET ORAL at 09:12

## 2024-12-01 RX ADMIN — SEVELAMER CARBONATE 800 MG: 800 TABLET, FILM COATED ORAL at 09:12

## 2024-12-01 RX ADMIN — GUAIFENESIN AND DEXTROMETHORPHAN HYDROBROMIDE 1 TABLET: 600; 30 TABLET, EXTENDED RELEASE ORAL at 09:12

## 2024-12-01 RX ADMIN — PANTOPRAZOLE SODIUM 40 MG: 40 TABLET, DELAYED RELEASE ORAL at 09:12

## 2024-12-01 RX ADMIN — ATOVAQUONE 1500 MG: 750 SUSPENSION ORAL at 09:12

## 2024-12-01 RX ADMIN — APIXABAN 5 MG: 5 TABLET, FILM COATED ORAL at 09:12

## 2024-12-01 RX ADMIN — PROCHLORPERAZINE EDISYLATE 5 MG: 5 INJECTION INTRAMUSCULAR; INTRAVENOUS at 08:12

## 2024-12-01 RX ADMIN — LEVOTHYROXINE SODIUM 25 MCG: 25 TABLET ORAL at 05:12

## 2024-12-01 NOTE — PLAN OF CARE
Outpatient Antibiotic Therapy Plan:     Please arrange at outpatient dialysis center.     1) Infection: e coli urosepsis     2) Discharge Antibiotics:     Intravenous antibiotics:  Cefazolin 3g IV after HD on Tues and Sat (~72 hr interdialytic periods)           3) Therapy Duration:  14 days     Estimated end date of IV antibiotics: 12/9/24     4) Outpatient Weekly Labs:     Order the following labs to be drawn on Mondays:   CBC  CMP   CRP           5) Fax Lab Results to Infectious Diseases Provider: Karly Norman      [facility] Fax Number: [phone]

## 2024-12-01 NOTE — PLAN OF CARE
12/01/24 1019   Medicare Message   Important Message from Medicare regarding Discharge Appeal Rights Given to patient/caregiver;Explained to patient/caregiver;Signed/date by patient/caregiver   Date IMM was signed 12/01/24   Time IMM was signed 1016     Patient provided with copy.

## 2024-12-01 NOTE — PLAN OF CARE
Patient dc. Removed PIV and telemetry monitor. No distress noted. Dialysis completed yesterday,no problem noted. Dtg will .  Problem: Adult Inpatient Plan of Care  Goal: Plan of Care Review  Outcome: Met  Goal: Patient-Specific Goal (Individualized)  Outcome: Met  Goal: Absence of Hospital-Acquired Illness or Injury  Outcome: Met  Goal: Optimal Comfort and Wellbeing  Outcome: Met  Goal: Readiness for Transition of Care  Outcome: Met     Problem: Sepsis/Septic Shock  Goal: Optimal Coping  Outcome: Met  Goal: Absence of Bleeding  Outcome: Met  Goal: Blood Glucose Level Within Targeted Range  Outcome: Met  Goal: Absence of Infection Signs and Symptoms  Outcome: Met  Goal: Optimal Nutrition Intake  Outcome: Met     Problem: Infection  Goal: Absence of Infection Signs and Symptoms  Outcome: Met     Problem: Wound  Goal: Optimal Coping  Outcome: Met  Goal: Optimal Functional Ability  Outcome: Met  Goal: Absence of Infection Signs and Symptoms  Outcome: Met  Goal: Improved Oral Intake  Outcome: Met  Goal: Optimal Pain Control and Function  Outcome: Met  Goal: Skin Health and Integrity  Outcome: Met  Goal: Optimal Wound Healing  Outcome: Met     Problem: Hemodialysis  Goal: Safe, Effective Therapy Delivery  Outcome: Met  Goal: Effective Tissue Perfusion  Outcome: Met  Goal: Absence of Infection Signs and Symptoms  Outcome: Met     Problem: Pain Acute  Goal: Optimal Pain Control and Function  Outcome: Met     Problem: Fatigue  Goal: Improved Activity Tolerance  Outcome: Met     Problem: Fall Injury Risk  Goal: Absence of Fall and Fall-Related Injury  Outcome: Met

## 2024-12-01 NOTE — PLAN OF CARE
Problem: Adult Inpatient Plan of Care  Goal: Plan of Care Review  Outcome: Progressing  Goal: Patient-Specific Goal (Individualized)  Outcome: Progressing  Goal: Optimal Comfort and Wellbeing  Outcome: Progressing  Goal: Readiness for Transition of Care  Outcome: Progressing     Problem: Sepsis/Septic Shock  Goal: Optimal Coping  Outcome: Progressing     Problem: Infection  Goal: Absence of Infection Signs and Symptoms  Outcome: Progressing     Problem: Wound  Goal: Optimal Pain Control and Function  Outcome: Progressing     Problem: Pain Acute  Goal: Optimal Pain Control and Function  Outcome: Progressing     Problem: Fatigue  Goal: Improved Activity Tolerance  Outcome: Progressing     Problem: Fall Injury Risk  Goal: Absence of Fall and Fall-Related Injury  Outcome: Progressing

## 2024-12-01 NOTE — PLAN OF CARE
[facility] - Med Surg  Discharge Final Note    Primary Care Provider: Josy Keane PA-C  Case Management Final Discharge Note      Discharge Disposition: home    New DME ordered / company name: none    Relevant SDOH / Transition of Care Barriers:  n/a    Primary Caretaker and contact information: self    Scheduled followup appointment: PCP  12/2/2024    Referrals placed: Infectious Disease    Transportation: family        Patient and family educated on discharge services and updated on DC plan. Bedside RN notified, patient clear to discharge from Case Management Perspective.     Expected Discharge Date: 12/1/2024    Final Discharge Note (most recent)       Final Note - 12/01/24 1042          Final Note    Assessment Type Final Discharge Note     Anticipated Discharge Disposition Home or Self Care     What phone number can be called within the next 1-3 days to see how you are doing after discharge? [phone]     Hospital Resources/Appts/Education Provided Appointments scheduled and added to AVS        Post-Acute Status    Coverage Payor: Cognitive Code [facility]     Discharge Delays None known at this time                     Important Message from Medicare  Important Message from Medicare regarding Discharge Appeal Rights: Given to patient/caregiver, Explained to patient/caregiver, Signed/date by patient/caregiver     Date IMM was signed: 12/01/24  Time IMM was signed: 1016    Contact Info       Josy Keane PA-C   Specialty: Family Medicine   Relationship: PCP - General    [address]  [place] [address]   Phone: [phone]       Next Steps: Go on 12/2/2024    [facility]    [address] [place] [id number]   Phone: [phone]       Next Steps: Follow up    Instructions: Resume dialysis as previously scheduled.

## 2024-12-01 NOTE — ASSESSMENT & PLAN NOTE
ESRD - dialysis twice weekly - Tuesday and Saturdays    Plan/Recommendation  -ok to discharge home from renal standpoint  -continue home torsemide 100 QD  -follow up RFP daily  -Keep MAP > 65  -Keep hemoglobin > 7  -Strict ins and outs  -Avoid nephrotoxic agents if possible and renally dose medications  -Avoid drastic hemodynamic changes if possible    #MPO positive ANCA vasculitis  Continue home pred       #Anemia  Hemoglobin   Date Value Ref Range Status   12/01/2024 9.4 (L) 12.0 - 16.0 g/dL Final     Iron   Date Value Ref Range Status   10/04/2024 59 30 - 160 ug/dL Final     Transferrin   Date Value Ref Range Status   10/04/2024 209 200 - 375 mg/dL Final     TIBC   Date Value Ref Range Status   10/04/2024 309 250 - 450 ug/dL Final     Saturated Iron   Date Value Ref Range Status   10/04/2024 19 (L) 20 - 50 % Final     Ferritin   Date Value Ref Range Status   10/04/2024 1,958 (H) 20.0 - 300.0 ng/mL Final   Hold iron replacement while awaiting sepsis workup  MAXIM weekly    #Secondary hyperparathyroidism  Calcium   Date Value Ref Range Status   12/01/2024 9.4 8.7 - 10.5 mg/dL Final     Phosphorus   Date Value Ref Range Status   12/01/2024 3.4 2.7 - 4.5 mg/dL Final     Obtain PTH

## 2024-12-01 NOTE — PROGRESS NOTES
[facility]  Nephrology  Progress Note    Patient Name: Kristin Goodman  MRN: [id number]  Admission Date: 11/25/2024  Hospital Length of Stay: 6 days  Attending Provider: Sophie Garner,*   Primary Care Physician: Josy Keane PA-C  Principal Problem:Severe sepsis    Subjective:     HPI: 79 year old female with a history of microscopic polyangiitis (MPO positive) with mixed SLE picture on rituximab with rheumatology (Last dose 11/17/2024), on HD (Tuesdays and Saturdays), HTN presents with fevers/chills from home. She had a fever to 102.3 in the ED and was admitted to  for sepsis workup.     Outpatient ESRD  Hemodialysis  Outpatient nephrologist: Dr. Harper  Outpatient center: [facility]  Dialysis schedule: Tuesdays/saturdays  Last treatment: 11/23  Anuric: makes urine - on torsemide 100 QD  Dry weight: 63.4  Access: Left AVF      Interval History: UOP 4x, UF only 323ml, due to cramping. NAEON. No complaints, ready to go home.    Review of patient's allergies indicates:   Allergen Reactions    Ampicillin     Lactose Diarrhea    Peaches [peach (prunus persica)] Other (See Comments)     Pt unable to state type of reaction. Information obtained from daughter who states she was informed of allergy from patient.    Penicillins      Other reaction(s): Hives, anaphylaxis    Sulfa (sulfonamide antibiotics) Rash and Hives     Current Facility-Administered Medications   Medication Frequency    acetaminophen tablet 650 mg Q4H PRN    acetaminophen tablet 650 mg Q8H PRN    albuterol sulfate nebulizer solution 2.5 mg Q6H PRN    apixaban tablet 5 mg BID    atovaquone 750 mg/5 mL oral liquid 1,500 mg Daily    carbamide peroxide 6.5 % otic solution 5 drop BID    cefTRIAXone injection 2 g Q24H    dextromethorphan-guaiFENesin  mg per 12 hr tablet 1 tablet BID    dextrose 10% bolus 125 mL 125 mL PRN    dextrose 10% bolus 250 mL 250 mL PRN    epoetin hira-epbx injection 3,200 Units Q7 Days     fluticasone propionate 50 mcg/actuation nasal spray 100 mcg Daily    glucagon (human recombinant) injection 1 mg PRN    glucose chewable tablet 16 g PRN    glucose chewable tablet 24 g PRN    guaiFENesin 100 mg/5 ml syrup 400 mg Q4H PRN    hydrALAZINE injection 10 mg Q4H PRN    HYDROcodone-acetaminophen 5-325 mg per tablet 1 tablet Q6H PRN    levothyroxine tablet 25 mcg Before breakfast    melatonin tablet 6 mg Nightly PRN    naloxone 0.4 mg/mL injection 0.02 mg PRN    pantoprazole EC tablet 40 mg Daily    polyethylene glycol packet 17 g Daily PRN    prochlorperazine injection Soln 5 mg Q6H PRN    QUEtiapine tablet 25 mg Nightly PRN    sevelamer carbonate tablet 800 mg TID WM    simethicone chewable tablet 80 mg QID PRN    sodium chloride 0.9% flush 10 mL Q12H PRN    torsemide tablet 100 mg Daily       Objective:     Vital Signs (Most Recent):  Temp: 97.9 °F (36.6 °C) (12/01/24 0738)  Pulse: 82 (12/01/24 0738)  Resp: 18 (12/01/24 0738)  BP: 134/71 (12/01/24 0738)  SpO2: 99 % (12/01/24 0738) Vital Signs (24h Range):  Temp:  [97.2 °F (36.2 °C)-98.5 °F (36.9 °C)] 97.9 °F (36.6 °C)  Pulse:  [] 82  Resp:  [16-20] 18  SpO2:  [96 %-100 %] 99 %  BP: (127-140)/(59-76) 134/71     Weight: 64 kg (141 lb 1.5 oz) (11/25/24 1940)  Body mass index is 26.66 kg/m².  Body surface area is 1.66 meters squared.    I/O last 3 completed shifts:  In: 860 [P.O.:360; Other:500]  Out: 823 [Other:823]     Physical Exam  Vitals and nursing note reviewed.   Constitutional:       Appearance: Normal appearance.   HENT:      Head: Normocephalic.      Nose: Nose normal.      Mouth/Throat:      Mouth: Mucous membranes are moist.   Eyes:      Extraocular Movements: Extraocular movements intact.      Conjunctiva/sclera: Conjunctivae normal.      Pupils: Pupils are equal, round, and reactive to light.   Cardiovascular:      Rate and Rhythm: Normal rate.      Pulses: Normal pulses.   Pulmonary:      Effort: Pulmonary effort is normal.      Breath  sounds: Normal breath sounds.   Abdominal:      Palpations: Abdomen is soft.   Musculoskeletal:         General: Normal range of motion.      Cervical back: Normal range of motion.   Skin:     General: Skin is warm.   Neurological:      General: No focal deficit present.      Mental Status: She is alert.   Psychiatric:         Mood and Affect: Mood normal.          Significant Labs:  All labs within the past 24 hours have been reviewed.  None     Significant Imaging:    Assessment/Plan:     Renal/  ESRD (end stage renal disease)  ESRD - dialysis twice weekly - Tuesday and Saturdays    Plan/Recommendation  -ok to discharge home from renal standpoint  -continue home torsemide 100 QD  -follow up RFP daily  -Keep MAP > 65  -Keep hemoglobin > 7  -Strict ins and outs  -Avoid nephrotoxic agents if possible and renally dose medications  -Avoid drastic hemodynamic changes if possible    #MPO positive ANCA vasculitis  Continue home pred       #Anemia  Hemoglobin   Date Value Ref Range Status   12/01/2024 9.4 (L) 12.0 - 16.0 g/dL Final     Iron   Date Value Ref Range Status   10/04/2024 59 30 - 160 ug/dL Final     Transferrin   Date Value Ref Range Status   10/04/2024 209 200 - 375 mg/dL Final     TIBC   Date Value Ref Range Status   10/04/2024 309 250 - 450 ug/dL Final     Saturated Iron   Date Value Ref Range Status   10/04/2024 19 (L) 20 - 50 % Final     Ferritin   Date Value Ref Range Status   10/04/2024 1,958 (H) 20.0 - 300.0 ng/mL Final   Hold iron replacement while awaiting sepsis workup  MAXIM weekly    #Secondary hyperparathyroidism  Calcium   Date Value Ref Range Status   12/01/2024 9.4 8.7 - 10.5 mg/dL Final     Phosphorus   Date Value Ref Range Status   12/01/2024 3.4 2.7 - 4.5 mg/dL Final     Obtain PTH        Thank you for your consult. I will follow-up with patient. Please contact us if you have any additional questions.    KYARA Medina  Nephrology  [facility] [place] - Med Surg

## 2024-12-01 NOTE — SUBJECTIVE & OBJECTIVE
Interval History: UOP 4x, UF only 323ml, due to cramping. NAEON. No complaints, ready to go home.    Review of patient's allergies indicates:   Allergen Reactions    Ampicillin     Lactose Diarrhea    Peaches [peach (prunus persica)] Other (See Comments)     Pt unable to state type of reaction. Information obtained from daughter who states she was informed of allergy from patient.    Penicillins      Other reaction(s): Hives, anaphylaxis    Sulfa (sulfonamide antibiotics) Rash and Hives     Current Facility-Administered Medications   Medication Frequency    acetaminophen tablet 650 mg Q4H PRN    acetaminophen tablet 650 mg Q8H PRN    albuterol sulfate nebulizer solution 2.5 mg Q6H PRN    apixaban tablet 5 mg BID    atovaquone 750 mg/5 mL oral liquid 1,500 mg Daily    carbamide peroxide 6.5 % otic solution 5 drop BID    cefTRIAXone injection 2 g Q24H    dextromethorphan-guaiFENesin  mg per 12 hr tablet 1 tablet BID    dextrose 10% bolus 125 mL 125 mL PRN    dextrose 10% bolus 250 mL 250 mL PRN    epoetin hira-epbx injection 3,200 Units Q7 Days    fluticasone propionate 50 mcg/actuation nasal spray 100 mcg Daily    glucagon (human recombinant) injection 1 mg PRN    glucose chewable tablet 16 g PRN    glucose chewable tablet 24 g PRN    guaiFENesin 100 mg/5 ml syrup 400 mg Q4H PRN    hydrALAZINE injection 10 mg Q4H PRN    HYDROcodone-acetaminophen 5-325 mg per tablet 1 tablet Q6H PRN    levothyroxine tablet 25 mcg Before breakfast    melatonin tablet 6 mg Nightly PRN    naloxone 0.4 mg/mL injection 0.02 mg PRN    pantoprazole EC tablet 40 mg Daily    polyethylene glycol packet 17 g Daily PRN    prochlorperazine injection Soln 5 mg Q6H PRN    QUEtiapine tablet 25 mg Nightly PRN    sevelamer carbonate tablet 800 mg TID WM    simethicone chewable tablet 80 mg QID PRN    sodium chloride 0.9% flush 10 mL Q12H PRN    torsemide tablet 100 mg Daily       Objective:     Vital Signs (Most Recent):  Temp: 97.9 °F (36.6 °C)  (12/01/24 0738)  Pulse: 82 (12/01/24 0738)  Resp: 18 (12/01/24 0738)  BP: 134/71 (12/01/24 0738)  SpO2: 99 % (12/01/24 0738) Vital Signs (24h Range):  Temp:  [97.2 °F (36.2 °C)-98.5 °F (36.9 °C)] 97.9 °F (36.6 °C)  Pulse:  [] 82  Resp:  [16-20] 18  SpO2:  [96 %-100 %] 99 %  BP: (127-140)/(59-76) 134/71     Weight: 64 kg (141 lb 1.5 oz) (11/25/24 1940)  Body mass index is 26.66 kg/m².  Body surface area is 1.66 meters squared.    I/O last 3 completed shifts:  In: 860 [P.O.:360; Other:500]  Out: 823 [Other:823]     Physical Exam  Vitals and nursing note reviewed.   Constitutional:       Appearance: Normal appearance.   HENT:      Head: Normocephalic.      Nose: Nose normal.      Mouth/Throat:      Mouth: Mucous membranes are moist.   Eyes:      Extraocular Movements: Extraocular movements intact.      Conjunctiva/sclera: Conjunctivae normal.      Pupils: Pupils are equal, round, and reactive to light.   Cardiovascular:      Rate and Rhythm: Normal rate.      Pulses: Normal pulses.   Pulmonary:      Effort: Pulmonary effort is normal.      Breath sounds: Normal breath sounds.   Abdominal:      Palpations: Abdomen is soft.   Musculoskeletal:         General: Normal range of motion.      Cervical back: Normal range of motion.   Skin:     General: Skin is warm.   Neurological:      General: No focal deficit present.      Mental Status: She is alert.   Psychiatric:         Mood and Affect: Mood normal.          Significant Labs:  All labs within the past 24 hours have been reviewed.  None     Significant Imaging:

## 2024-12-01 NOTE — NURSING
[facility]  Nurses Note -- 4 Eyes      12/1/2024       Skin assessed on: Q Shift      [x] No Pressure Injuries Present    [x]Prevention Measures Documented    [] Yes LDA  for Pressure Injury Previously documented     [] Yes New Pressure Injury Discovered   [] LDA for New Pressure Injury Added      Attending RN:  Marie Lanier RN     Second RN:  Kalpana

## 2024-12-01 NOTE — NURSING
[facility] [place]  Nurses Note -- 4 Eyes      11/30/2024       Skin assessed on: Q Shift      [x] No Pressure Injuries Present    []Prevention Measures Documented    [] Yes LDA  for Pressure Injury Previously documented     [] Yes New Pressure Injury Discovered   [] LDA for New Pressure Injury Added      Attending RN:  Kalpana Perez, RN     Second RN:  Jacqueline Dupree LPN

## 2024-12-01 NOTE — DISCHARGE SUMMARY
[facility]  Discharge Summary      Patient Name: Kristin Goodman  MRN: [id number]  CLAU: [id number]  Patient Class: IP- Inpatient  Admission Date: 11/25/2024  Hospital Length of Stay: 6 days  Discharge Date and Time:  12/01/2024 12:19 PM  Attending Physician: No att. providers found   Discharging Provider: Sophie Garner MD  Primary Care Provider: Jsoy Keane PA-C    Primary Care Team: Networked reference to record PCT     HPI:   78 yo w/ ANCA+ microscopic polyangitis dx 2 yrs ago, w/ associated GN causing ESRD, on HD x 2 yrs but non oliguric  She seen rheum and gets rituximab every 6 mo, just had her infusion last week.  She goes to HD every T/Th/Sat and tolerates it well.  She also has HTN, hypothyroidism, h/o left brachiocephalic vein occlusion (I believe this is why she takes Eliquis?), DJD, VHD, dyslipidemia.  Despite all this she generally feels pretty good and is active - After HD Sat she felt sluggish which is not unusual, but woke up Sunday feeling like she had a cold w/ cough productive of yellow sputum and congestion. She went to be and got up this AM feeling more sluggish and had a temp of 101.4, daughter brought her to ED, temp 102.3, sepsis alert activated, no bolus d/t ESRD, CXR w/o acute disease, urine w/ WBC and RBC but no bacteria, source of infection uncertain. She has had ESBL + e coli UTI and enterococcal bacteremia in the past but it's been awhile.  SARS CoV2 negative, PCT 2.1  Given vanc and cefepime.  Nephrology aware of admit, will likely need HD tomorrow.  BCX done in ED.  HM to admit.  Pt currently lying on stretcher, feeling better, daughter at bedside.      * No surgery found *      Hospital Course:   78 yo w/ ANCA+ microscopic polyangitis dx 2 yrs ago, w/ associated GN on rituximab every 6 months, (last infusion last week) ESRD, on HD TTS x 2 yrs , non oliguric, HTN, hypothyroidism, h/o left brachiocephalic vein occlusion on Eliquis who  was admitted for sepsis secondary to UTI with E coli bacteremia Chest x-ray with no acute consolidation Respiratory cultures/RIP negative.  tested negative for COVID/flu.  Procalcitonin 2.09 Blood cultures with E coli.  UA revealed > 100 WBC with no bacteria.  Urine and blood cultures cultures with [facility]  Nephrology consulted for inpatient HD needs.  ID was consulted transitioned to ceftriaxone repeat blood cx negative .  Patient is discharged home on cefazolin 3g IV after HD on Tues and Sat (~72 hr interdialytic periods) for 14 days.  ABDOUL 12/09/2024  To follow up with PCP/ID outpatient     Goals of Care Treatment Preferences:  Code Status: Full Code      SDOH Screening:  The patient declined to be screened for utility difficulties, food insecurity, transport difficulties, housing insecurity, and interpersonal safety, so no concerns could be identified this admission.     Consults:   Consults (From admission, onward)          Status Ordering Provider     Inpatient consult to Infectious Diseases  Once        Provider:  Karly Norman MD    Completed YNES GUTIERREZ     Inpatient consult to Gastroenterology  Once        Provider:  Mariam Painter NP    Completed YNES GUTIERREZ     Inpatient consult to Nephrology  Once        Provider:  Blue Puente MD    Completed MAGALIE NGUYEN            No new Assessment & Plan notes have been filed under this hospital service since the last note was generated.  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] 10/26/2022 Yes    Chronic diastolic heart failure [I50.32] 08/31/2020 Yes    Hypothyroid [E03.9]  Yes    Primary hypertension [I10]  Yes      Problems Resolved During this Admission:    Diagnosis Date Noted Date Resolved POA    Valvular heart disease [I38] 11/25/2024 11/27/2024 Yes    Acute bronchitis [J20.9] 11/25/2024 11/26/2024 Yes    Immunosuppression due to drug therapy [D84.821, Z79.899] 11/10/2022 11/25/2024 Not Applicable    Gastric reflux [K21.9] 11/10/2022 11/26/2024 Yes    DONAVAN (obstructive sleep apnea) [G47.33] 11/09/2020 11/26/2024 Yes       Discharged Condition: good    Disposition: Home or Self Care    Follow Up:   Follow-up Information       Josy Keane PA-C. Go on 12/2/2024.    Specialty: Family Medicine  Contact information:  [address]  [facility] [address]  [phone]               [facility] Follow up.    Why: Resume dialysis as previously scheduled.  Contact information:  [address]  [facility] [address] [phone]                         Patient Instructions:      CULTURE, BLOOD   Standing Status: Future Standing Exp. Date: 02/25/26     Order Specific Question Answer Comments   Send normal result to authorizing provider's In Basket if patient is active on MyChart: Yes      CULTURE, BLOOD   Standing Status: Future Standing Exp. Date: 02/25/26     Order Specific Question Answer Comments   Send normal result to authorizing provider's In Basket if patient is active on MyChart: Yes      Ambulatory referral/consult to Infectious Disease   Standing Status: Future   Referral Priority: Routine Referral Type: Consultation   Referral Reason: Specialty Services Required   Requested Specialty: Infectious Diseases   Number of Visits Requested: 1     Diet renal     Activity as tolerated        Significant Diagnostic Studies: Labs: CMP   Recent Labs   Lab 11/30/24  0609 12/01/24  0610    137   K 4.1 3.3*    97   CO2 20* 26   GLU 81 85   BUN 41* 18   CREATININE 4.3* 3.9*   CALCIUM 9.6 9.4   ALBUMIN 2.8* 2.9*   ANIONGAP 15 14    and CBC   Recent Labs   Lab 12/01/24  0610   WBC 8.94   HGB 9.4*   HCT 31.0*          Pending Diagnostic Studies:       None           Medications:  Reconciled Home Medications:      Medication List        CHANGE how you take these medications      atovaquone 750 mg/5 mL Susp oral liquid  Commonly known as: MEPRON  Take 10 mLs (1,500 mg total) by mouth once daily.  What changed: when to take this     RENVELA 800 mg Tab  Generic drug: sevelamer carbonate  Take 1 tablet (800 mg total) by mouth 3 (three) times daily.  What changed: when to take this     torsemide 100 MG Tab  Commonly known as: DEMADEX  Take 1 tablet (100 mg total) by mouth once daily.  What changed:   how much to take  when to take this            CONTINUE taking these medications      albuterol 90 mcg/actuation inhaler  Commonly known as: VENTOLIN HFA  Inhale 2 puffs into the lungs every 6 (six) hours as needed for Shortness of Breath. Rescue     amLODIPine 10 MG tablet  Commonly known as: NORVASC  Take 0.5 tablets (5 mg total) by mouth once daily.     apixaban 5 mg Tab  Commonly known as: ELIQUIS  Take 1 tablet (5 mg total) by mouth 2 (two) times daily.     carvediloL 12.5 MG tablet  Commonly known as: COREG  Take 1 tablet (12.5 mg total) by mouth 2 (two) times daily.     cyclobenzaprine 5 MG tablet  Commonly known as: FLEXERIL  Take 1 tablet (5 mg total) by mouth nightly as needed for Muscle spasms.     fluticasone propionate 50 mcg/actuation nasal spray  Commonly known as: FLONASE  1 spray (50 mcg total) by Each Nostril route 2 (two) times daily.     levocetirizine 5 MG tablet  Commonly known as: XYZAL  Take 0.5 tablets (2.5 mg total) by mouth every evening. For sinus     levothyroxine 25  MCG tablet  Commonly known as: EUTHYROX  Take 1 tablet (25 mcg total) by mouth once daily.     MIRCERA INJ  50 mcg.     pantoprazole 40 MG tablet  Commonly known as: PROTONIX  Take 1 tablet (40 mg total) by mouth 2 (two) times daily before meals.     QUEtiapine 25 MG Tab  Commonly known as: SEROQUEL  Take 1 tablet (25 mg total) by mouth every evening.     tobramycin sulfate 0.3% 0.3 % ophthalmic solution  Commonly known as: TOBREX  Apply 1-2 drops to wound beds twice daily     VITAMIN D ORAL  Take 0.25 mcg by mouth.              Indwelling Lines/Drains at time of discharge:   Lines/Drains/Airways       Central Venous Catheter Line  Duration              -- days     583 days              Drain  Duration                  Hemodialysis AV Graft 09/07/23 1030 Left forearm 451 days                    Time spent on the discharge of patient: 35 minutes         Sophie Garner MD  Department of Hospital Medicine  [facility] [place] - Med Surg

## 2024-12-01 NOTE — PLAN OF CARE
Order for Abx with dialysis faxed to [facility] on [address].  CM to confirm receipt of information on Monday.

## 2024-12-02 ENCOUNTER — OFFICE VISIT (OUTPATIENT)
Dept: FAMILY MEDICINE | Facility: CLINIC | Age: 79
End: 2024-12-02
Payer: MEDICARE

## 2024-12-02 ENCOUNTER — TELEPHONE (OUTPATIENT)
Dept: ENDOSCOPY | Facility: HOSPITAL | Age: 79
End: 2024-12-02
Payer: MEDICARE

## 2024-12-02 ENCOUNTER — E-CONSULT (OUTPATIENT)
Dept: CARDIOLOGY | Facility: CLINIC | Age: 79
End: 2024-12-02
Payer: MEDICARE

## 2024-12-02 VITALS
SYSTOLIC BLOOD PRESSURE: 120 MMHG | OXYGEN SATURATION: 95 % | BODY MASS INDEX: 26.68 KG/M2 | WEIGHT: 141.31 LBS | DIASTOLIC BLOOD PRESSURE: 68 MMHG | TEMPERATURE: 98 F | HEART RATE: 78 BPM | HEIGHT: 61 IN | RESPIRATION RATE: 16 BRPM

## 2024-12-02 DIAGNOSIS — Z09 HOSPITAL DISCHARGE FOLLOW-UP: Primary | ICD-10-CM

## 2024-12-02 DIAGNOSIS — Z01.810 PREOP CARDIOVASCULAR EXAM: Primary | ICD-10-CM

## 2024-12-02 DIAGNOSIS — F15.20 OTHER STIMULANT DEPENDENCE, UNCOMPLICATED: ICD-10-CM

## 2024-12-02 DIAGNOSIS — N18.6 ESRD (END STAGE RENAL DISEASE): ICD-10-CM

## 2024-12-02 DIAGNOSIS — N18.6 ESRD (END STAGE RENAL DISEASE): Primary | ICD-10-CM

## 2024-12-02 DIAGNOSIS — I10 PRIMARY HYPERTENSION: ICD-10-CM

## 2024-12-02 DIAGNOSIS — K62.89 RECTAL MASS: ICD-10-CM

## 2024-12-02 DIAGNOSIS — R39.9 UTI SYMPTOMS: ICD-10-CM

## 2024-12-02 PROCEDURE — 1160F RVW MEDS BY RX/DR IN RCRD: CPT | Mod: CPTII,S$GLB,,

## 2024-12-02 PROCEDURE — 99214 OFFICE O/P EST MOD 30 MIN: CPT | Mod: S$GLB,,,

## 2024-12-02 PROCEDURE — 99999 PR PBB SHADOW E&M-EST. PATIENT-LVL V: CPT | Mod: PBBFAC,,,

## 2024-12-02 PROCEDURE — 3074F SYST BP LT 130 MM HG: CPT | Mod: CPTII,S$GLB,,

## 2024-12-02 PROCEDURE — 3078F DIAST BP <80 MM HG: CPT | Mod: CPTII,S$GLB,,

## 2024-12-02 PROCEDURE — 3288F FALL RISK ASSESSMENT DOCD: CPT | Mod: CPTII,S$GLB,,

## 2024-12-02 PROCEDURE — 1159F MED LIST DOCD IN RCRD: CPT | Mod: CPTII,S$GLB,,

## 2024-12-02 PROCEDURE — 1101F PT FALLS ASSESS-DOCD LE1/YR: CPT | Mod: CPTII,S$GLB,,

## 2024-12-02 PROCEDURE — 1111F DSCHRG MED/CURRENT MED MERGE: CPT | Mod: CPTII,S$GLB,,

## 2024-12-02 NOTE — TELEPHONE ENCOUNTER
"----- Message from Sarah sent at 11/29/2024  9:05 AM CST -----  Regarding: FW: Endo scheduling    ----- Message -----  From: Mariam Painter NP  Sent: 11/27/2024  12:40 PM CST  To: [facility]  Subject: Endo scheduling                                  Procedure: Colonoscopy    Diagnosis: Surveillance colonoscopy - Hx of colon polyps and rectal mass on ct    Procedure Timing: Within 4 weeks (Urgent)    #If within 4 weeks selected, please wilbert as high priority#    #If greater than 12 weeks, please select "4-12 weeks" and delay sending until 2 months prior to requested date#     Provider: Dr Moraels or Any endoscopist    Location: Hospital Based (  endo)    Additional Scheduling Information: Blood thinners    Prep Specifications: Suprep    Have you attached a patient to this message: Yes  "

## 2024-12-02 NOTE — TELEPHONE ENCOUNTER
Referral for procedure from [facility]      Spoke to daughter Roro to schedule procedure(s) Colonoscopy       Physician to perform procedure(s) Dr. EMILE Carlin  Date of Procedure (s) 12/20/24  Arrival Time 2:15 PM  Time of Procedure(s) 3:15 PM   Location of Procedure(s) [place] 4th Floor  Type of Rx Prep sent to patient: Miralax  Instructions provided to patient via [facility]    Patient was informed on the following information and verbalized understanding. Screening questionnaire reviewed with patient and complete. If procedure requires anesthesia, a responsible adult needs to be present to accompany the patient home, patient cannot drive after receiving anesthesia. Appointment details are tentative, especially check-in time. Patient will receive a prep-op call 7 days prior to confirm check-in time for procedure. If applicable the patient should contact their pharmacy to verify Rx for procedure prep is ready for pick-up. Patient was advised to call the scheduling department at [phone] if pharmacy states no Rx is available. Patient was advised to call the endoscopy scheduling department if any questions or concerns arise.       Endoscopy Scheduling Department
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
## 2024-12-02 NOTE — CONSULTS
[facility] - Cardiology  Response for E-Consult     Patient Name: Kristin Goodman  MRN: [id number]  Primary Care Provider: Josy Keane PA-C   Requesting Provider: Yasmin Avitia NP  E-Consult to General Cardiology  Consult performed by: Cesar Mills MD  Consult ordered by: Yasmin Avitia NP          Recommendation:  May hold Eliquis as needed for colonoscopy    Contingency that warrants a repeat eConsult or referral: n/a    Total time of Consultation: 5 minute    I did not speak to the requesting provider verbally about this.     *This eConsult is based on the clinical data available to me and is furnished without benefit of a physical examination. The eConsult will need to be interpreted in light of any clinical issues or changes in patient status not available to me at the time of filing this eConsults. Significant changes in patient condition or level of acuity should result in immediate formal consultation and reevaluation. Please alert me if you have further questions.    Thank you for this eConsult referral.     Cesar Mills MD  [facility] - Cardiology

## 2024-12-04 ENCOUNTER — PATIENT OUTREACH (OUTPATIENT)
Dept: ADMINISTRATIVE | Facility: CLINIC | Age: 79
End: 2024-12-04
Payer: MEDICARE

## 2024-12-04 LAB
BACTERIA BLD CULT: NORMAL
BACTERIA BLD CULT: NORMAL

## 2024-12-04 NOTE — PROGRESS NOTES
C3 nurse spoke with Kristin Goodman's daughter Roro for a TCC post hospital discharge follow up call. The patient has already had a scheduled HOSFU appointment with Josy Keane PA-C  on 12/02/24.

## 2024-12-04 NOTE — TELEPHONE ENCOUNTER
Prednisone 5mg by mouth daily; 2.5mg by mouth starting 12/8/22.   Tylenol 650mg , ii by mouth PRN.

## 2024-12-12 ENCOUNTER — OFFICE VISIT (OUTPATIENT)
Dept: OBSTETRICS AND GYNECOLOGY | Facility: CLINIC | Age: 79
End: 2024-12-12
Payer: MEDICARE

## 2024-12-12 VITALS
BODY MASS INDEX: 26.87 KG/M2 | WEIGHT: 142.19 LBS | DIASTOLIC BLOOD PRESSURE: 77 MMHG | SYSTOLIC BLOOD PRESSURE: 124 MMHG

## 2024-12-12 DIAGNOSIS — N89.8 VAGINAL IRRITATION: Primary | ICD-10-CM

## 2024-12-12 PROCEDURE — 1126F AMNT PAIN NOTED NONE PRSNT: CPT | Mod: CPTII,S$GLB,,

## 2024-12-12 PROCEDURE — 3078F DIAST BP <80 MM HG: CPT | Mod: CPTII,S$GLB,,

## 2024-12-12 PROCEDURE — 99204 OFFICE O/P NEW MOD 45 MIN: CPT | Mod: S$GLB,,,

## 2024-12-12 PROCEDURE — 1111F DSCHRG MED/CURRENT MED MERGE: CPT | Mod: CPTII,S$GLB,,

## 2024-12-12 PROCEDURE — 0352U VAGINOSIS SCREEN BY DNA PROBE: CPT

## 2024-12-12 PROCEDURE — 3288F FALL RISK ASSESSMENT DOCD: CPT | Mod: CPTII,S$GLB,,

## 2024-12-12 PROCEDURE — 1101F PT FALLS ASSESS-DOCD LE1/YR: CPT | Mod: CPTII,S$GLB,,

## 2024-12-12 PROCEDURE — 99999 PR PBB SHADOW E&M-EST. PATIENT-LVL III: CPT | Mod: PBBFAC,,,

## 2024-12-12 PROCEDURE — 3074F SYST BP LT 130 MM HG: CPT | Mod: CPTII,S$GLB,,

## 2024-12-12 PROCEDURE — 1159F MED LIST DOCD IN RCRD: CPT | Mod: CPTII,S$GLB,,

## 2024-12-12 RX ORDER — MUPIROCIN 20 MG/G
OINTMENT TOPICAL 3 TIMES DAILY
Qty: 15 G | Refills: 1 | Status: SHIPPED | OUTPATIENT
Start: 2024-12-12
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
## 2024-12-18 ENCOUNTER — OFFICE VISIT (OUTPATIENT)
Dept: UROLOGY | Facility: CLINIC | Age: 79
End: 2024-12-18
Payer: MEDICARE

## 2024-12-18 ENCOUNTER — PATIENT MESSAGE (OUTPATIENT)
Dept: OBSTETRICS AND GYNECOLOGY | Facility: CLINIC | Age: 79
End: 2024-12-18
Payer: MEDICARE

## 2024-12-18 VITALS — WEIGHT: 140.56 LBS | BODY MASS INDEX: 26.56 KG/M2

## 2024-12-18 DIAGNOSIS — R39.9 UTI SYMPTOMS: Primary | ICD-10-CM

## 2024-12-18 DIAGNOSIS — B37.9 YEAST INFECTION: Primary | ICD-10-CM

## 2024-12-18 DIAGNOSIS — R35.0 URINARY FREQUENCY: ICD-10-CM

## 2024-12-18 DIAGNOSIS — I82.90 ACUTE DEEP VEIN THROMBOSIS (DVT) OF NON-EXTREMITY VEIN: ICD-10-CM

## 2024-12-18 DIAGNOSIS — R35.1 NOCTURIA: ICD-10-CM

## 2024-12-18 PROCEDURE — 99204 OFFICE O/P NEW MOD 45 MIN: CPT | Mod: S$GLB,,, | Performed by: UROLOGY

## 2024-12-18 PROCEDURE — 1160F RVW MEDS BY RX/DR IN RCRD: CPT | Mod: CPTII,S$GLB,, | Performed by: UROLOGY

## 2024-12-18 PROCEDURE — 1101F PT FALLS ASSESS-DOCD LE1/YR: CPT | Mod: CPTII,S$GLB,, | Performed by: UROLOGY

## 2024-12-18 PROCEDURE — 87186 SC STD MICRODIL/AGAR DIL: CPT | Performed by: UROLOGY

## 2024-12-18 PROCEDURE — 1159F MED LIST DOCD IN RCRD: CPT | Mod: CPTII,S$GLB,, | Performed by: UROLOGY

## 2024-12-18 PROCEDURE — 1111F DSCHRG MED/CURRENT MED MERGE: CPT | Mod: CPTII,S$GLB,, | Performed by: UROLOGY

## 2024-12-18 PROCEDURE — 87086 URINE CULTURE/COLONY COUNT: CPT | Performed by: UROLOGY

## 2024-12-18 PROCEDURE — 3288F FALL RISK ASSESSMENT DOCD: CPT | Mod: CPTII,S$GLB,, | Performed by: UROLOGY

## 2024-12-18 PROCEDURE — 99999 PR PBB SHADOW E&M-EST. PATIENT-LVL IV: CPT | Mod: PBBFAC,,, | Performed by: UROLOGY

## 2024-12-18 PROCEDURE — 87088 URINE BACTERIA CULTURE: CPT | Performed by: UROLOGY

## 2024-12-18 RX ORDER — FLUCONAZOLE 150 MG/1
150 TABLET ORAL
Qty: 2 TABLET | Refills: 0 | Status: SHIPPED | OUTPATIENT
Start: 2024-12-18
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
## 2024-12-20 ENCOUNTER — ANESTHESIA (OUTPATIENT)
Dept: ENDOSCOPY | Facility: HOSPITAL | Age: 79
End: 2024-12-20
Payer: MEDICARE

## 2024-12-20 ENCOUNTER — HOSPITAL ENCOUNTER (OUTPATIENT)
Facility: HOSPITAL | Age: 79
Discharge: HOME | End: 2024-12-20
Attending: INTERNAL MEDICINE | Admitting: INTERNAL MEDICINE
Payer: MEDICARE

## 2024-12-20 ENCOUNTER — ANESTHESIA EVENT (OUTPATIENT)
Dept: ENDOSCOPY | Facility: HOSPITAL | Age: 79
End: 2024-12-20
Payer: MEDICARE

## 2024-12-20 VITALS
WEIGHT: 140 LBS | BODY MASS INDEX: 26.43 KG/M2 | OXYGEN SATURATION: 98 % | HEIGHT: 61 IN | RESPIRATION RATE: 16 BRPM | SYSTOLIC BLOOD PRESSURE: 121 MMHG | TEMPERATURE: 98 F | DIASTOLIC BLOOD PRESSURE: 58 MMHG | HEART RATE: 76 BPM

## 2024-12-20 DIAGNOSIS — R93.5 ABNORMAL ABDOMINAL CT SCAN: ICD-10-CM

## 2024-12-20 DIAGNOSIS — R13.19 ESOPHAGEAL DYSPHAGIA: Primary | ICD-10-CM

## 2024-12-20 DIAGNOSIS — R35.0 URINARY FREQUENCY: Primary | ICD-10-CM

## 2024-12-20 LAB — BACTERIA UR CULT: ABNORMAL

## 2024-12-20 PROCEDURE — 37000008 HC ANESTHESIA 1ST 15 MINUTES: Performed by: INTERNAL MEDICINE

## 2024-12-20 PROCEDURE — 63600175 PHARM REV CODE 636 W HCPCS: Performed by: NURSE ANESTHETIST, CERTIFIED REGISTERED

## 2024-12-20 PROCEDURE — 37000009 HC ANESTHESIA EA ADD 15 MINS: Performed by: INTERNAL MEDICINE

## 2024-12-20 PROCEDURE — 45378 DIAGNOSTIC COLONOSCOPY: CPT | Performed by: INTERNAL MEDICINE

## 2024-12-20 PROCEDURE — 45378 DIAGNOSTIC COLONOSCOPY: CPT | Mod: ,,, | Performed by: INTERNAL MEDICINE

## 2024-12-20 RX ORDER — LIDOCAINE HYDROCHLORIDE 20 MG/ML
INJECTION, SOLUTION EPIDURAL; INFILTRATION; INTRACAUDAL; PERINEURAL
Status: DISCONTINUED | OUTPATIENT
Start: 2024-12-20 | End: 2024-12-20

## 2024-12-20 RX ORDER — PROPOFOL 10 MG/ML
INJECTION, EMULSION INTRAVENOUS CONTINUOUS PRN
Status: DISCONTINUED | OUTPATIENT
Start: 2024-12-20 | End: 2024-12-20

## 2024-12-20 RX ORDER — PROPOFOL 10 MG/ML
VIAL (ML) INTRAVENOUS
Status: DISCONTINUED | OUTPATIENT
Start: 2024-12-20 | End: 2024-12-20

## 2024-12-20 RX ORDER — CIPROFLOXACIN 500 MG/1
500 TABLET ORAL 2 TIMES DAILY
Qty: 14 TABLET | Refills: 0 | Status: SHIPPED | OUTPATIENT
Start: 2024-12-20 | End: 2024-12-27

## 2024-12-20 RX ADMIN — LIDOCAINE HYDROCHLORIDE 100 MG: 20 INJECTION, SOLUTION EPIDURAL; INFILTRATION; INTRACAUDAL; PERINEURAL at 02:12

## 2024-12-20 RX ADMIN — PROPOFOL 30 MG: 10 INJECTION, EMULSION INTRAVENOUS at 02:12

## 2024-12-20 RX ADMIN — PROPOFOL 150 MCG/KG/MIN: 10 INJECTION, EMULSION INTRAVENOUS at 02:12

## 2024-12-20 RX ADMIN — PROPOFOL 70 MG: 10 INJECTION, EMULSION INTRAVENOUS at 02:12

## 2024-12-20 NOTE — ANESTHESIA POSTPROCEDURE EVALUATION
Anesthesia Post Evaluation    Patient: Kristin Goodman    Procedure(s) Performed: Procedure(s) (LRB):  COLONOSCOPY (N/A)    Final Anesthesia Type: general      Patient location during evaluation: PACU  Patient participation: Yes- Able to Participate  Level of consciousness: awake and alert  Post-procedure vital signs: reviewed and stable  Pain management: adequate  Airway patency: patent    PONV status at discharge: No PONV  Anesthetic complications: no      Cardiovascular status: blood pressure returned to baseline  Respiratory status: unassisted  Hydration status: euvolemic  Follow-up not needed.              Vitals Value Taken Time   /58 12/20/24 1459   Temp 36.7 °C (98 °F) 12/20/24 1424   Pulse 76 12/20/24 1459   Resp 16 12/20/24 1459   SpO2 98 % 12/20/24 1459         Event Time   Out of Recovery 14:59:35         Pain/Kathryn Score: Kathryn Score: 10 (12/20/2024  2:25 PM)

## 2024-12-20 NOTE — PROVATION PATIENT INSTRUCTIONS
Discharge Summary/Instructions after an Endoscopic Procedure  Patient Name: Kristin Goodman  Patient MRN: [id number]  Patient YOB: 1945 Friday, December 20, 2024  Brenden Carlin MD  Dear patient,  As a result of recent federal legislation (The Federal Cures Act), you may   receive lab or pathology results from your procedure in your [facility]   account before your physician is able to contact you. Your physician or   their representative will relay the results to you with their   recommendations at their soonest availability.  Thank you,  RESTRICTIONS:  During your procedure today, you received medications for sedation.  These   medications may affect your judgment, balance and coordination.  Therefore,   for 24 hours, you have the following restrictions:   - DO NOT drive a car, operate machinery, make legal/financial decisions,   sign important papers or drink alcohol.    ACTIVITY:  Today: no heavy lifting, straining or running due to procedural   sedation/anesthesia.  The following day: return to full activity including work.  DIET:  Eat and drink normally unless instructed otherwise.     TREATMENT FOR COMMON SIDE EFFECTS:  - Mild abdominal pain, nausea, belching, bloating or excessive gas:  rest,   eat lightly and use a heating pad.  - Sore Throat: treat with throat lozenges and/or gargle with warm salt   water.  - Because air was used during the procedure, expelling large amounts of air   from your rectum or belching is normal.  - If a bowel prep was taken, you may not have a bowel movement for 1-3 days.    This is normal.  SYMPTOMS TO WATCH FOR AND REPORT TO YOUR PHYSICIAN:  1. Abdominal pain or bloating, other than gas cramps.  2. Chest pain.  3. Back pain.  4. Signs of infection such as: chills or fever occurring within 24 hours   after the procedure.  5. Rectal bleeding, which would show as bright red, maroon, or black stools.   (A tablespoon of blood from the rectum is not serious, especially if    hemorrhoids are present.)  6. Vomiting.  7. Weakness or dizziness.  GO DIRECTLY TO THE NEAREST EMERGENCY ROOM IF YOU HAVE ANY OF THE FOLLOWING:      Difficulty breathing              Chills and/or fever over 101 F   Persistent vomiting and/or vomiting blood   Severe abdominal pain   Severe chest pain   Black, tarry stools   Bleeding- more than one tablespoon   Any other symptom or condition that you feel may need urgent attention  Your doctor recommends these additional instructions:  If any biopsies were taken, your doctors clinic will contact you in 1 to 2   weeks with any results.  - Discharge patient to home.   - Repeat colonoscopy is not recommended due to current age (66 years or   older) for surveillance.   - The findings and recommendations were discussed with the designated   responsible adult.   - Resume Eliquis (apixaban) at prior dose today.  For questions, problems or results please call your physician - Brenden Carlin MD at Work:  [phone].  [facility], EMERGENCY ROOM PHONE NUMBER: [phone]  IF A COMPLICATION OR EMERGENCY SITUATION ARISES AND YOU ARE UNABLE TO REACH   YOUR PHYSICIAN - GO DIRECTLY TO THE EMERGENCY ROOM.  Brenden Carlin MD  12/20/2024 2:23:38 PM  This report has been verified and signed electronically.  Dear patient,  As a result of recent federal legislation (The Federal Cures Act), you may   receive lab or pathology results from your procedure in your [facility]   account before your physician is able to contact you. Your physician or   their representative will relay the results to you with their   recommendations at their soonest availability.  Thank you,  PROVATION

## 2024-12-20 NOTE — TRANSFER OF CARE
"Anesthesia Transfer of Care Note    Patient: Kristin Goodman    Procedure(s) Performed: Procedure(s) (LRB):  COLONOSCOPY (N/A)    Patient location: GI    Anesthesia Type: general    Transport from OR: Transported from OR on room air with adequate spontaneous ventilation    Post pain: adequate analgesia    Post assessment: no apparent anesthetic complications    Post vital signs: stable    Level of consciousness: responds to stimulation and sedated    Nausea/Vomiting: no nausea/vomiting    Complications: none    Transfer of care protocol was followed      Last vitals: Visit Vitals  BP (!) 104/51   Pulse 77   Temp 36.7 °C (98 °F)   Resp 16   Ht 5' 1" (1.549 m)   Wt 63.5 kg (140 lb)   SpO2 99%   Breastfeeding No   BMI 26.45 kg/m²     "

## 2024-12-20 NOTE — ANESTHESIA PREPROCEDURE EVALUATION
[facility]  Anesthesia Pre-Operative Evaluation     Patient Name: Kristin Goodman  YOB: 1945  MRN: [id number]  [facility]: [id number]       Admit Date: 12/20/2024   Admit Team: Networked reference to record PCT   Hospital Day: 1  Date of Procedure: 12/20/2024  Anesthesia: Choice Procedure: Procedure(s) (LRB):  COLONOSCOPY (N/A)  Pre-Operative Diagnosis: Rectal mass [K62.89]  Proceduralist:Surgeons and Role:     * Brenden Carlin MD - Primary  Code Status: Prior   Advanced Directive: <no information>  Isolation Precautions: No active isolations  Capacity: Full capacity     SUBJECTIVE:   Kristin Goodman is a 79 y.o. female who  has a past medical history of Acute blood loss anemia (10/17/2022), Acute hypoxemic respiratory failure (10/23/2022), Allergy, Anticoagulant long-term use, Back pain, Chronic diastolic heart failure (08/31/2020), Chronic diastolic heart failure (08/31/2020), Colon polyp, Disorder of kidney and ureter, Encounter for blood transfusion, H/O Bell's palsy (2006), Helicobacter pylori (H. pylori), HTN (hypertension), Hypothyroid, OA (osteoarthritis), Occlusion of vein, DONAVAN (obstructive sleep apnea) (11/09/2020), Pneumonia due to other staphylococcus, Pulmonary HTN (08/31/2020), Sepsis due to pneumonia (10/17/2022), Septic shock (10/27/2022), Severe sepsis (11/25/2024), Trouble in sleeping, Urinary incontinence, and Vasculitis, ANCA positive.  No notes on file    Hospital LOS: 0 days  ICU LOS: Patient does not have an ICU stay during this admission.    she has a current medication list which includes the following long-term medication(s): albuterol, amlodipine, carvedilol, levocetirizine, levothyroxine, pantoprazole, quetiapine, renvela, and torsemide.   Current Outpatient Medications   Medication Instructions    albuterol (VENTOLIN HFA) 90 mcg/actuation inhaler 2 puffs, Inhalation, Every 6 hours PRN, Rescue    amLODIPine (NORVASC) 5 mg, Oral, Daily    apixaban (ELIQUIS) 5 mg, Oral, 2  times daily    atovaquone (MEPRON) 1,500 mg, Oral, Daily    carvediloL (COREG) 12.5 mg, Oral, 2 times daily    ciprofloxacin HCl (CIPRO) 500 mg, Oral, 2 times daily    cyclobenzaprine (FLEXERIL) 5 mg, Oral, Nightly PRN    d-mannose 500 mg Cap 1 capsule, Oral, Daily    ergocalciferol, vitamin D2, (VITAMIN D ORAL) 0.25 mcg    fluconazole (DIFLUCAN) 150 mg, Oral, Every 72 hours PRN    fluticasone propionate (FLONASE) 50 mcg, Each Nostril, 2 times daily    levocetirizine (XYZAL) 2.5 mg, Oral, Nightly, For sinus    levothyroxine (EUTHYROX) 25 mcg, Oral, Daily    methoxy peg-epoetin beta (MIRCERA INJ) 50 mcg    mupirocin (BACTROBAN) 2 % ointment Topical (Top), 3 times daily    pantoprazole (PROTONIX) 40 mg, Oral, 2 times daily before meals    QUEtiapine (SEROQUEL) 25 mg, Oral, Nightly    RENVELA 800 mg, Oral, 3 times daily    tobramycin sulfate 0.3% (TOBREX) 0.3 % ophthalmic solution Apply 1-2 drops to wound beds twice daily    torsemide (DEMADEX) 100 mg, Oral, Daily     ALLERGIES:     Review of patient's allergies indicates:   Allergen Reactions    Ampicillin     Lactose Diarrhea    Peaches [peach (prunus persica)] Other (See Comments)     Pt unable to state type of reaction. Information obtained from daughter who states she was informed of allergy from patient.    Penicillins      Other reaction(s): Hives, anaphylaxis    Sulfa (sulfonamide antibiotics) Rash and Hives     LDA:   AIRWAY:         * No LDAs found *      Lines/Drains/Airways       Central Venous Catheter Line  Duration             Tunneled Central Line Insertion/Assessment - Double Lumen  Subclavian Left -- days         Hemodialysis Catheter 04/28/23 1254 left internal jugular 602 days              Drain  Duration                  Hemodialysis AV Graft 09/07/23 1030 Left forearm 470 days                   Anesthesia Evaluation      Airway   Mallampati: III  TM distance: Normal  Neck ROM: Normal ROM  Dental    (+) Intact    Pulmonary    (+) pneumonia,  shortness of breath, sleep apnea  Cardiovascular   (+) hypertension, CAREY    Neuro/Psych      GI/Hepatic/Renal    (+) chronic renal disease    Endo/Other    (+) hypothyroidism, arthritis  Abdominal                    MEDICATIONS:     Current Outpatient Medications on File Prior to Encounter   Medication Sig Dispense Refill Last Dose/Taking    albuterol (VENTOLIN HFA) 90 mcg/actuation inhaler Inhale 2 puffs into the lungs every 6 (six) hours as needed for Shortness of Breath. Rescue (Patient not taking: Reported on 12/4/2024) 18 g 3     amLODIPine (NORVASC) 10 MG tablet Take 0.5 tablets (5 mg total) by mouth once daily.       apixaban (ELIQUIS) 5 mg Tab Take 1 tablet (5 mg total) by mouth 2 (two) times daily. 60 tablet 1     atovaquone (MEPRON) 750 mg/5 mL Susp oral liquid Take 10 mLs (1,500 mg total) by mouth once daily. 300 mL 3     carvediloL (COREG) 12.5 MG tablet Take 1 tablet (12.5 mg total) by mouth 2 (two) times daily. 60 tablet 11     cyclobenzaprine (FLEXERIL) 5 MG tablet Take 1 tablet (5 mg total) by mouth nightly as needed for Muscle spasms. 20 tablet 2     ergocalciferol, vitamin D2, (VITAMIN D ORAL) Take 0.25 mcg by mouth. Takes at dialysis       fluticasone propionate (FLONASE) 50 mcg/actuation nasal spray 1 spray (50 mcg total) by Each Nostril route 2 (two) times daily. 16 g 3     levocetirizine (XYZAL) 5 MG tablet Take 0.5 tablets (2.5 mg total) by mouth every evening. For sinus 15 tablet 11     levothyroxine (EUTHYROX) 25 MCG tablet Take 1 tablet (25 mcg total) by mouth once daily. 90 tablet 3     methoxy peg-epoetin beta (MIRCERA INJ) 50 mcg.       pantoprazole (PROTONIX) 40 MG tablet Take 1 tablet (40 mg total) by mouth 2 (two) times daily before meals. 120 tablet 0     QUEtiapine (SEROQUEL) 25 MG Tab Take 1 tablet (25 mg total) by mouth every evening. 30 tablet 11     RENVELA 800 mg Tab Take 1 tablet (800 mg total) by mouth 3 (three) times daily. 30 tablet 11     tobramycin sulfate 0.3% (TOBREX)  0.3 % ophthalmic solution Apply 1-2 drops to wound beds twice daily (Patient not taking: Reported on 12/18/2024) 5 mL 2     torsemide (DEMADEX) 100 MG Tab Take 1 tablet (100 mg total) by mouth once daily. 30 tablet 0       Inpatient Medications:  Antibiotics (From admission, onward)      None          VTE Risk Mitigation (From admission, onward)      None              No current facility-administered medications for this encounter.          History:   There are no hospital problems to display for this patient.    Surgical History:    has a past surgical history that includes Total abdominal hysterectomy; Oophorectomy; Colonoscopy (N/A, 09/28/2020); Knee arthroscopy w/ meniscectomy (Right, 12/21/2021); Arthroscopic chondroplasty of knee joint (Right, 12/21/2021); Synovectomy of knee (Right, 12/21/2021); Esophagogastroduodenoscopy (N/A, 11/14/2022); Placement of arteriovenous graft (Left, 09/07/2023); Esophagogastroduodenoscopy (N/A, 10/17/2024); Colonoscopy (N/A, 10/17/2024); and AV fistula placement (Left).   Social History:    reports never being sexually active.  reports that she has quit smoking. Her smoking use included cigarettes. She has a 3 pack-year smoking history. She has never used smokeless tobacco. She reports that she does not drink alcohol and does not use drugs.    There were no vitals filed for this visit.  Vital Signs Range (Last 24H):       There is no height or weight on file to calculate BMI.  Wt Readings from Last 4 Encounters:   12/18/24 63.7 kg (140 lb 8.7 oz)   12/12/24 64.5 kg (142 lb 3.2 oz)   12/02/24 64.1 kg (141 lb 5 oz)   11/25/24 64 kg (141 lb 1.5 oz)        Intake/Output - Last 3 Shifts       None          Lab Results   Component Value Date    WBC 8.94 12/01/2024    HGB 9.4 (L) 12/01/2024    HCT 31.0 (L) 12/01/2024     12/01/2024     12/01/2024    K 3.3 (L) 12/01/2024    CL 97 12/01/2024    CREATININE 3.9 (H) 12/01/2024    BUN 18 12/01/2024    CO2 26 12/01/2024    GLU 85  "12/01/2024    CALCIUM 9.4 12/01/2024    MG 1.9 11/26/2024    PHOS 3.4 12/01/2024    ALKPHOS 68 11/25/2024    ALT 9 (L) 11/25/2024    AST 17 11/25/2024    ALBUMIN 2.9 (L) 12/01/2024    INR 1.0 09/06/2023    APTT 29.2 09/06/2023    HGBA1C 4.7 08/29/2024    LACTATE 1.2 08/06/2023    CPK 92 11/06/2022    TROPONINI 0.013 11/25/2024     (H) 11/25/2024     No results found for this or any previous visit (from the past 12 hours).  No results for input(s): "WBC", "HGB", "HCT", "PLT", "NA", "K", "CREATININE", "GLU", "INR", "LACTATE", "5HIAAPLASMA", "5HIAAURINT", "5HIAA", "[id number]" in the last 168 hours.  No LMP recorded. Patient has had a hysterectomy.    EKG:   Results for orders placed or performed during the hospital encounter of 11/25/24   EKG 12-lead    Collection Time: 11/25/24 11:56 AM   Result Value Ref Range    QRS Duration 60 ms    [facility] QTC Calculation 421 ms    Narrative    Test Reason : A41.9,    Vent. Rate : 103 BPM     Atrial Rate : 103 BPM     P-R Int : 126 ms          QRS Dur :  60 ms      QT Int : 322 ms       P-R-T Axes :  58  60  23 degrees    QTcB Int : 421 ms    Sinus tachycardia  Otherwise normal ECG  When compared with ECG of 21-Jun-2024 08:58,  Significant changes have occurred  Confirmed by Dieudonne Barr ([id number]) on 11/26/2024 6:42:47 PM    Referred By: AAAREFERRAL SELF           Confirmed By: Dieudonne Barr     TTE:  Results for orders placed during the hospital encounter of 11/25/24    Echo    Interpretation Summary    Left Ventricle: The left ventricle is normal in size. Normal wall thickness. There is normal systolic function with a visually estimated ejection fraction of 55 - 60%. Grade I diastolic dysfunction.    Right Ventricle: Normal right ventricular cavity size. Systolic function is normal.    Pulmonary Artery: The estimated pulmonary artery systolic pressure is 44 mmHg.    Pericardium: There is a trivial posterior effusion. No indication of cardiac tamponade.    TOSIN:  No results " found for this or any previous visit.    Stress Test:  No results found for this or any previous visit.    Results for orders placed during the hospital encounter of 08/31/23    Stress Echo Which stress agent will be used? Pharmacological; Color Flow Doppler? No    Interpretation Summary    Left Ventricle: The left ventricle is normal in size. Normal wall thickness. Normal wall motion. There is normal systolic function with a visually estimated ejection fraction of 60 - 65%. Grade I diastolic dysfunction.    Right Ventricle: Normal right ventricular cavity size. Wall thickness is normal. Right ventricle wall motion  is normal. Systolic function is normal.    Aortic Valve: There is mild aortic regurgitation.    Mitral Valve: There is mild regurgitation.    Tricuspid Valve: There is mild regurgitation.    Pulmonary Artery: The estimated pulmonary artery systolic pressure is 28 mmHg.    Stress Protocol: The patient was infused intravenously with dobutamine. The patient received a graduated infusion of the stress agent beginning at  mcg/kg/min . The peak heart rate was 133 bpm, which is 96% of age predicted maximum heart rate. The test was stopped because the end of the protocol was reached.    Post-stress    Impression  The study is negative with no echocardiographic evidence of stress induced ischemia.    LHC:  No results found for this or any previous visit.    Cardiac Device Check   No results found for this or any previous visit.    No results found for this or any previous visit.      Pre-op Assessment          Review of Systems  Cardiovascular:     Hypertension            CAREY           Shortness of Breath Dyspnea On Extertion                   Hypertension         Pulmonary:  Pneumonia    Shortness of breath  Sleep Apnea     Obstructive Sleep Apnea (DONAVAN).       Pulmonary Infection:  Pneumonia.     Renal/:  Chronic Renal Disease        Kidney Function/Disease             Musculoskeletal:  Arthritis         Arthritis          Neurological:      Arthritis                           Endocrine:   Hypothyroidism       Hypothyroidism              Physical Exam  General: Well nourished    Airway:  Mallampati: III / II  Mouth Opening: Normal  TM Distance: Normal  Tongue: Normal  Neck ROM: Normal ROM    Dental:  Intact        Anesthesia Plan  Type of Anesthesia, risks & benefits discussed:    Anesthesia Type: Gen ETT, Gen Natural Airway, MAC  Intra-op Monitoring Plan: Standard ASA Monitors  Post Op Pain Control Plan: multimodal analgesia and IV/PO Opioids PRN  Induction:  IV  Airway Plan: Direct and Video, Post-Induction  Informed Consent: Informed consent signed with the Patient and all parties understand the risks and agree with anesthesia plan.  All questions answered.   ASA Score: 3  Day of Surgery Review of History & Physical: H&P completed by Anesthesiologist.  Anesthesia Plan Notes: Chart reviewed, patient interviewed and examined.  The anesthetic plan was explained.  Risks, benefits, and alternatives were discussed. Questions were answered and the consent was signed.        JACLYN Smith M.D.         Ready For Surgery From Anesthesia Perspective.     .

## 2024-12-20 NOTE — INTERVAL H&P NOTE
The patient has been examined and the H&P has been reviewed:    I concur with the findings and no changes have occurred since H&P was written.    Anesthesia risks, benefits and alternative options discussed and understood by patient/family.    History and Exam unchanged from visit.    Procedure - COlonoscopy  Neck - supple  Plan of anesthesia - General  ASA - per anesthesia  Mallampati - per anesthesia  Anesthesia problems - no  Family history of anesthesia problems - no        There are no hospital problems to display for this patient.

## 2025-01-09 ENCOUNTER — PATIENT MESSAGE (OUTPATIENT)
Dept: RHEUMATOLOGY | Facility: CLINIC | Age: 80
End: 2025-01-09
Payer: MEDICARE

## 2025-01-10 RX ORDER — TORSEMIDE 100 MG/1
100 TABLET ORAL DAILY
Qty: 30 TABLET | Refills: 0 | Status: SHIPPED | OUTPATIENT
Start: 2025-01-10

## 2025-01-13 ENCOUNTER — HOSPITAL ENCOUNTER (OUTPATIENT)
Dept: RADIOLOGY | Facility: HOSPITAL | Age: 80
Discharge: HOME | End: 2025-01-13
Attending: UROLOGY
Payer: MEDICARE

## 2025-01-13 DIAGNOSIS — R35.1 NOCTURIA: ICD-10-CM

## 2025-01-13 PROCEDURE — 76770 US EXAM ABDO BACK WALL COMP: CPT | Mod: TC

## 2025-01-13 PROCEDURE — 76770 US EXAM ABDO BACK WALL COMP: CPT | Mod: 26,,, | Performed by: STUDENT IN AN ORGANIZED HEALTH CARE EDUCATION/TRAINING PROGRAM

## 2025-01-16 ENCOUNTER — PROCEDURE VISIT (OUTPATIENT)
Dept: UROLOGY | Facility: CLINIC | Age: 80
End: 2025-01-16
Payer: MEDICARE

## 2025-01-16 VITALS — WEIGHT: 144.63 LBS | BODY MASS INDEX: 27.33 KG/M2

## 2025-01-16 DIAGNOSIS — R35.0 URINARY FREQUENCY: ICD-10-CM

## 2025-01-16 PROCEDURE — 52000 CYSTOURETHROSCOPY: CPT | Mod: S$GLB,,, | Performed by: UROLOGY

## 2025-01-16 NOTE — PROCEDURES
Cystoscopy    Date/Time: 1/16/2025 3:30 PM    Performed by: Woo Soto MD  Authorized by: Woo Soto MD    Consent Done?:  Yes (Written)  Timeout: prior to procedure the correct patient, procedure, and site was verified    Prep: patient was prepped and draped in usual sterile fashion    Local anesthesia used?: No    Anesthesia:  Lidocaine jelly  Local anesthetic:  Lidocaine 2% topical gel  Anesthetic total (ml):  10  Indications: recurrent UTIs    Position:  Dorsal lithotomy  Anesthesia:  Lidocaine jelly  Patient sedated?: No    Preparation: Patient was prepped and draped in usual sterile fashion    Scope type:  Flexible cystoscope  Stent inserted: No    Stent removed: No    Urethra normal: Yes    Bladder neck normal: Yes    Bladder normal: Yes     patient tolerated the procedure well with no immediate complications  Comments:      No lesions  Consider estrogen cream if cleared by hematology

## 2025-01-24 ENCOUNTER — TELEPHONE (OUTPATIENT)
Dept: PODIATRY | Facility: CLINIC | Age: 80
End: 2025-01-24
Payer: MEDICARE

## 2025-01-24 NOTE — TELEPHONE ENCOUNTER
----- Message from Isaias Proctor sent at 1/23/2025  2:21 PM CST -----  Caller: Agueda/Maximino/[phone] (Today,  1:45 PM)  Caller is requesting an appointment   Caller is requesting a message be sent to the provider.      Reason for the appointment:  Ingrown toe nail procedure    Patient preference of timeframe to be scheduled:      Would the patient like a call back, or a response through their [facility] portal?:   call back    Comments:

## 2025-01-29 ENCOUNTER — OFFICE VISIT (OUTPATIENT)
Dept: PODIATRY | Facility: CLINIC | Age: 80
End: 2025-01-29
Payer: MEDICARE

## 2025-01-29 VITALS — HEIGHT: 61 IN | BODY MASS INDEX: 27.3 KG/M2 | WEIGHT: 144.63 LBS

## 2025-01-29 DIAGNOSIS — L60.0 INGROWN NAIL: Primary | ICD-10-CM

## 2025-01-29 DIAGNOSIS — M79.674 GREAT TOE PAIN, RIGHT: ICD-10-CM

## 2025-01-29 PROCEDURE — 99214 OFFICE O/P EST MOD 30 MIN: CPT | Mod: 25,S$GLB,, | Performed by: PODIATRIST

## 2025-01-29 PROCEDURE — 99999 PR PBB SHADOW E&M-EST. PATIENT-LVL IV: CPT | Mod: PBBFAC,,, | Performed by: PODIATRIST

## 2025-01-29 PROCEDURE — 1125F AMNT PAIN NOTED PAIN PRSNT: CPT | Mod: CPTII,S$GLB,, | Performed by: PODIATRIST

## 2025-01-29 PROCEDURE — 3288F FALL RISK ASSESSMENT DOCD: CPT | Mod: CPTII,S$GLB,, | Performed by: PODIATRIST

## 2025-01-29 PROCEDURE — 11750 EXCISION NAIL&NAIL MATRIX: CPT | Mod: T5,S$GLB,, | Performed by: PODIATRIST

## 2025-01-29 PROCEDURE — 1101F PT FALLS ASSESS-DOCD LE1/YR: CPT | Mod: CPTII,S$GLB,, | Performed by: PODIATRIST

## 2025-01-29 RX ORDER — LIDOCAINE HYDROCHLORIDE 10 MG/ML
1 INJECTION, SOLUTION INFILTRATION; PERINEURAL
Status: COMPLETED | OUTPATIENT
Start: 2025-01-29 | End: 2025-01-29

## 2025-01-29 RX ORDER — BUPIVACAINE HYDROCHLORIDE 5 MG/ML
1 INJECTION, SOLUTION EPIDURAL; INTRACAUDAL ONCE
Status: COMPLETED | OUTPATIENT
Start: 2025-01-29 | End: 2025-01-29

## 2025-01-29 RX ADMIN — BUPIVACAINE HYDROCHLORIDE 5 MG: 5 INJECTION, SOLUTION EPIDURAL; INTRACAUDAL at 03:01

## 2025-01-29 RX ADMIN — LIDOCAINE HYDROCHLORIDE 1 ML: 10 INJECTION, SOLUTION INFILTRATION; PERINEURAL at 02:01

## 2025-01-29 NOTE — PROGRESS NOTES
Subjective:      Patient ID: Kristin Goodman is a 79 y.o. female.    Chief Complaint: Ingrown Toenail    Kristin Goodman is a 79 y.o. female who presents to the clinic complaining of painful ingrown toenail on both feet. Patient has not attempted self treatment.  This is a  recurrent problem. Seen for similar issue in the past. Patient denies history of trauma. Patient  denies purulent drainage.    09/26/2024 Patient returns to of left hallux abscess.  She has been taking care of the toe as discussed on last encounter, relates significant improvement however the the nail still he is following towards the skin.  No further pedal complaints.      01/29/2025 Presents to the clinic complaining of painful ingrown toenail on right foot medial border and with dorsal pressure centrally. Patient has not attempted self treatment.  This is a recurrent problem. Seen for similar issue in the past with procedure to left toe. Patient denies history of trauma. Patient  denies purulent drainage.    Current shoe gear: casual sandal, ecco    Patient Active Problem List   Diagnosis    Hypothyroid    Primary hypertension    Mitral valve regurgitation    Chest pain    Syncope    CAREY (dyspnea on exertion)    Chronic diastolic heart failure    Sleep arousal disorder    Acute medial meniscal tear, right, initial encounter    Impaired mobility    S/P meniscectomy    Other nonthrombocytopenic purpura    Atherosclerosis of renal artery    Alteration in instrumental activities of daily living (IADL)    Other specified anemias    Antineutrophilic cytoplasmic antibody (ANCA) vasculitis    Moderate malnutrition    Dysphagia    Hematuria syndrome    Dependence on hemodialysis    Anemia due to chronic kidney disease    Dizzy spells    Acute deep vein thrombosis (DVT) of non-extremity vein - subclavian    Secondary hyperparathyroidism of renal origin    Lightheadedness    Current long-term use of anticoagulant medication with history of deep venous  thrombosis (DVT)    ESRD (end stage renal disease)    Other stimulant dependence, uncomplicated    UTI symptoms    Severe sepsis    Advance care planning    UTI (urinary tract infection)    Gram-negative bacteremia    Hospital discharge follow-up       Current Outpatient Medications on File Prior to Visit   Medication Sig Dispense Refill    apixaban (ELIQUIS) 5 mg Tab Take 1 tablet (5 mg total) by mouth 2 (two) times daily. 60 tablet 1    atovaquone (MEPRON) 750 mg/5 mL Susp oral liquid Take 10 mLs (1,500 mg total) by mouth once daily. 300 mL 3    carvediloL (COREG) 12.5 MG tablet Take 1 tablet (12.5 mg total) by mouth 2 (two) times daily. 60 tablet 11    cyclobenzaprine (FLEXERIL) 5 MG tablet Take 1 tablet (5 mg total) by mouth nightly as needed for Muscle spasms. 20 tablet 2    d-mannose 500 mg Cap Take 1 capsule by mouth Daily. 30 capsule 11    ergocalciferol, vitamin D2, (VITAMIN D ORAL) Take 0.25 mcg by mouth. Takes at dialysis      fluconazole (DIFLUCAN) 150 MG Tab Take 1 tablet (150 mg total) by mouth every 72 hours as needed (yeast infection symptoms). (Patient not taking: Reported on 1/31/2025) 2 tablet 0    fluticasone propionate (FLONASE) 50 mcg/actuation nasal spray 1 spray (50 mcg total) by Each Nostril route 2 (two) times daily. 16 g 3    levocetirizine (XYZAL) 5 MG tablet Take 0.5 tablets (2.5 mg total) by mouth every evening. For sinus 15 tablet 11    levothyroxine (EUTHYROX) 25 MCG tablet Take 1 tablet (25 mcg total) by mouth once daily. 90 tablet 3    methoxy peg-epoetin beta (MIRCERA INJ) 50 mcg.      mupirocin (BACTROBAN) 2 % ointment Apply topically 3 (three) times daily. 15 g 1    QUEtiapine (SEROQUEL) 25 MG Tab Take 1 tablet (25 mg total) by mouth every evening. 30 tablet 11    RENVELA 800 mg Tab Take 1 tablet (800 mg total) by mouth 3 (three) times daily. 30 tablet 11    tobramycin sulfate 0.3% (TOBREX) 0.3 % ophthalmic solution Apply 1-2 drops to wound beds twice daily 5 mL 2    torsemide  (DEMADEX) 100 MG Tab Take 1 tablet (100 mg total) by mouth once daily. 30 tablet 0    albuterol (VENTOLIN HFA) 90 mcg/actuation inhaler Inhale 2 puffs into the lungs every 6 (six) hours as needed for Shortness of Breath. Rescue (Patient not taking: Reported on 12/4/2024) 18 g 3     No current facility-administered medications on file prior to visit.       Review of patient's allergies indicates:   Allergen Reactions    Ampicillin     Lactose Diarrhea    Peaches [peach (prunus persica)] Other (See Comments)     Pt unable to state type of reaction. Information obtained from daughter who states she was informed of allergy from patient.    Penicillins      Other reaction(s): Hives, anaphylaxis    Sulfa (sulfonamide antibiotics) Rash and Hives       Past Surgical History:   Procedure Laterality Date    ARTHROSCOPIC CHONDROPLASTY OF KNEE JOINT Right 12/21/2021    Procedure: ARTHROSCOPY, KNEE, WITH CHONDROPLASTY;  Surgeon: Elly Sullivan MD;  Location: [facility] OR;  Service: Orthopedics;  Laterality: Right;    AV FISTULA PLACEMENT Left     COLONOSCOPY N/A 09/28/2020    Procedure: COLONOSCOPY;  Surgeon: Jaylan Flynn MD;  Location: [facility] ENDO;  Service: Endoscopy;  Laterality: N/A;    COLONOSCOPY N/A 10/17/2024    Procedure: COLONOSCOPY;  Surgeon: Rosanna Mitchell MD;  Location: [facility] ENDO;  Service: Gastroenterology;  Laterality: N/A;    COLONOSCOPY N/A 12/20/2024    Procedure: COLONOSCOPY;  Surgeon: Brenden Carlin MD;  Location: [facility] (4TH FLR);  Service: Endoscopy;  Laterality: N/A;  urgent referral REJI Painter NP-dialysis/labs prior-miralax-instr portal-GT  ok to hold Eliquis 2 days per Dr Mills-see E consult dated 12/2-GT  12/13-pre call complete with daughter-confirmed eliquis hold-tb-labs prior-tb    ESOPHAGOGASTRODUODENOSCOPY N/A 11/14/2022    Procedure: EGD (ESOPHAGOGASTRODUODENOSCOPY);  Surgeon: Asaf Hahn MD;  Location: [facility] (2ND FLR);  Service: Endoscopy;  Laterality: N/A;    ESOPHAGOGASTRODUODENOSCOPY  N/A 10/17/2024    Procedure: EGD (ESOPHAGOGASTRODUODENOSCOPY);  Surgeon: Rosanna Mitchell MD;  Location: [facility] ENDO;  Service: Gastroenterology;  Laterality: N/A;  Suzy valdez, handed to pt. Eliquis, Dialysis LAB ordered, ASam  been approved to hold Eliquis (apixaban) for 2 days per Dr. Mills-see E consult dated 10/7-GT  10/11/24-Precall complete, holding Eliquis starting 10/15-DS    KNEE ARTHROSCOPY W/ MENISCECTOMY Right 12/21/2021    Procedure: ARTHROSCOPY, KNEE, WITH MENISCECTOMY;  Surgeon: Elly Sullivan MD;  Location: [facility] OR;  Service: Orthopedics;  Laterality: Right;  general, regional w catheter, adductor, josefina 50cc,     OOPHORECTOMY      PLACEMENT OF ARTERIOVENOUS GRAFT Left 09/07/2023    Procedure: INSERTION, GRAFT, ARTERIOVENOUS;  Surgeon: John [place] MD;  Location: [facility] OR;  Service: Vascular;  Laterality: Left;  RN PREOP 8/31/2023 TYPE&SCREEN---done 9/6/2023 9:00 AM-----HAS CARDS CLEARANCE    SYNOVECTOMY OF KNEE Right 12/21/2021    Procedure: SYNOVECTOMY, KNEE;  Surgeon: Elly Sullivan MD;  Location: [facility] OR;  Service: Orthopedics;  Laterality: Right;    TOTAL ABDOMINAL HYSTERECTOMY      19 yrs ago       Family History   Problem Relation Name Age of Onset    Arthritis Mother      Early death Mother  56    Hypertension Mother      Diabetes Father      Early death Father  62    Hypertension Father      Stroke Father      Arthritis Sister      Cancer Sister          cervical    Early death Sister  63    Heart disease Sister          anyuresem    Hypertension Sister      Hyperlipidemia Sister      Alzheimer's disease Sister      Rheum arthritis Sister      Arthritis Brother      Cancer Brother          lung cancer    Early death Brother  59    Heart disease Brother          heart attack    Hypertension Brother      Vision loss Brother      Prostate cancer Brother      Hypertension Daughter      Breast cancer Other niece        Social History     Socioeconomic History    Marital  status:    Tobacco Use    Smoking status: Former     Current packs/day: 0.25     Average packs/day: 0.2 packs/day for 43.1 years (10.8 ttl pk-yrs)     Types: Cigarettes     Start date: 1982    Smokeless tobacco: Never    Tobacco comments:     Quit ~ 30 years ago   Substance and Sexual Activity    Alcohol use: No    Drug use: No    Sexual activity: Never     Partners: Male     Social Drivers of Health     Financial Resource Strain: Patient Declined (11/25/2024)    Overall Financial Resource Strain (CARDIA)     Difficulty of Paying Living Expenses: Patient declined   Food Insecurity: Patient Declined (11/25/2024)    Hunger Vital Sign     Worried About Running Out of Food in the Last Year: Patient declined     Ran Out of Food in the Last Year: Patient declined   Transportation Needs: Patient Declined (11/25/2024)    TRANSPORTATION NEEDS     Transportation : Patient declined   Physical Activity: Insufficiently Active (8/28/2024)    Exercise Vital Sign     Days of Exercise per Week: 1 day     Minutes of Exercise per Session: 10 min   Stress: Patient Declined (11/25/2024)    [employer] - Occupational Stress Questionnaire     Feeling of Stress : Patient declined   Housing Stability: Patient Declined (11/25/2024)    Housing Stability Vital Sign     Unable to Pay for Housing in the Last Year: Patient declined     Homeless in the Last Year: Patient declined       Review of Systems   Constitutional: Negative for chills and fever.   Cardiovascular:  Negative for claudication and leg swelling.   Respiratory:  Negative for cough and shortness of breath.    Skin:  Positive for dry skin and nail changes. Negative for itching and rash.   Musculoskeletal:  Positive for arthritis, joint pain, muscle weakness, myalgias and stiffness. Negative for falls and joint swelling.   Gastrointestinal:  Negative for diarrhea, nausea and vomiting.   Neurological:  Negative for numbness, paresthesias, tremors  "and weakness.   Psychiatric/Behavioral:  Negative for altered mental status and hallucinations.            Objective:      Vitals:    01/29/25 1505   Weight: 65.6 kg (144 lb 10 oz)   Height: 5' 1" (1.549 m)   PainSc:   4           Physical Exam  Vitals and nursing note reviewed.   Constitutional:       General: She is not in acute distress.     Appearance: She is well-developed. She is not toxic-appearing or diaphoretic.      Comments: alert and oriented x 3.    Cardiovascular:      Pulses:           Dorsalis pedis pulses are 2+ on the right side and 2+ on the left side.        Posterior tibial pulses are 2+ on the right side and 2+ on the left side.      Comments:  Capillary refill time is within normal limits. Digital hair present.   Pulmonary:      Effort: No respiratory distress.   Musculoskeletal:         General: No deformity.      Right ankle: No swelling. No tenderness. No lateral malleolus, medial malleolus, AITF ligament, CF ligament or posterior TF ligament tenderness. Normal range of motion.      Right Achilles Tendon: No defects. Lo's test negative.      Left ankle: No swelling. No tenderness. No lateral malleolus, medial malleolus, AITF ligament, CF ligament or posterior TF ligament tenderness. Normal range of motion.      Left Achilles Tendon: No defects. Lo's test negative.      Right foot: No tenderness or bony tenderness.      Left foot: No tenderness or bony tenderness.      Comments: Fat pad atrophy to heels and met heads bilateral    There is equinus deformity bilateral with decreased dorsiflexion at the ankle joint bilateral.     Decreased first MPJ range of motion both weightbearing and nonweightbearing, + crepitus observed the first MP joint, + dorsal flag sign. Moderate bunion deformity is observed .    Patient has hammertoes of digits 2-5 bilateral partially reducible    Feet:      Right foot:      Toenail Condition: Right toenails are ingrown. Fungal disease present.     Left " foot:      Toenail Condition: Fungal disease present.     Comments: Tenderness to medial hallux nail margin of right with ingrown nail plate and HPK buildup.      Lymphadenopathy:      Comments: No lymphatic streaking     Skin:     General: Skin is warm and dry.      Coloration: Skin is not pale.      Findings: No bruising, burn, laceration or rash.      Nails: There is no clubbing.      Comments:        Neurological:      Sensory: No sensory deficit.      Motor: No tremor, atrophy or abnormal muscle tone.      Deep Tendon Reflexes: Reflexes are normal and symmetric.      Comments: Light touch present     Psychiatric:         Attention and Perception: She is attentive.         Mood and Affect: Mood is not anxious. Affect is not inappropriate.         Speech: She is communicative. Speech is not slurred.         Behavior: Behavior is not combative.       09/26/2024 08/28/2024    Left foot with purulence             Assessment:       Encounter Diagnoses   Name Primary?    Ingrown nail Yes    Great toe pain, right                Plan:       Kristin was seen today for ingrown toenail.    Diagnoses and all orders for this visit:    Ingrown nail  -     Nail Removal    Great toe pain, right  -     Nail Removal    Other orders  -     BUPivacaine (PF) 0.5% (5 mg/mL) injection 5 mg  -     LIDOcaine HCL 10 mg/ml (1%) injection 1 mL          I counseled the patient on her conditions, their implications and medical management.    Informed patient that many nail problems can be prevented by wearing the right shoes and trimming your nails properly.   The right shoes: Patient is to wear shoes that are supportive and roomy enough for toes to wiggle. Look for shoes made of natural materials such as leather, which allow  feet to breathe.   Proper trimming: To avoid problems, she was instructed to trim toenails straight across without cutting down into the corners.     Discussed treatment options with patient. Options included  soaking, partial avulsion and matrixectomy. Risks and benefits discussed and all questions were answered. The patient wishes to proceed with chemical matrixectomy.  An informed consent was obtained.    The patient was  dispensed a surgical shoe today and given verbal and written post procedure instructions and is to keep it covered at all times. Follow in this office in two weeks for reevaluation, sooner p.r.n. if he experiences fever, chills, night sweats, nausea, vomiting, redness, pain out of proportion, drainage, pus or malodor of the surgical toe.    RTC for scheduled ingrown nail procedure        Nail Removal    Date/Time: 1/29/2025 2:15 PM    Performed by: Ghislaine Hernandez DPM  Authorized by: Ghislaine Hernandez DPM    Consent Done?:  Yes (Written)  Location:     Location:  Right foot  Anesthesia:     Anesthesia:  Digital block    Local anesthetic:  Bupivacaine 0.5% without epinephrine and lidocaine 1% without epinephrine    Anesthetic total (ml):  3  Procedure Details:     Preparation:  Skin prepped with Betadine    Amount removed:  1/5    Side:  Medial    Wedge excision of skin of nail fold: No      Nail bed sutured?: No      Nail matrix removed:  Partial    Removal method:  Phenol and alcohol    Removed nail replaced and anchored: No      Dressing applied:  4x4 and dressing applied (iodosorb)    Patient tolerance:  Patient tolerated the procedure well with no immediate complications

## 2025-01-29 NOTE — PATIENT INSTRUCTIONS
"    Ingrown Toenail, Excised  An ingrown toenail occurs when the nail grows sideways into the skin alongside the nail. This can cause pain, especially when wearing tight shoes. It can also lead to an infection with redness and swelling.  The side of the nail will need to be removed in order to stop the pain and release any infection present. If there is a lot of redness and swelling, then an antibiotic may also be used. The redness and pain should begin to go away within 48 hours. It will take about two weeks for the exposed nail bed to become dry and all of the swelling to go down.  If only the side of the nail was removed it will begin to grow back in a few months. To prevent recurrence, that side of nail bed may be treated with a strong chemical to prevent the nail from regrowing ("ablation").  Home care  The following guidelines will help you care for your toe at home:  Once a day beginning in 24 hours:  Clean the toe separate from your body with warm running water and antibacterial soap such as dial soap or saline wound spray  Clean any remaining crust away with soap and water using a cotton-tipped applicator. And DRY completely dry  Apply betadine to the toe twice daily.  Cover with a breathable bandage or until the exposed nail bed is dry and there is no more drainage.  Change the dressing daily, or whenever it becomes wet or dirty.  If you were prescribed antibiotics, take them as directed until they are all gone.  Wear comfortable shoes with a lot of toe room, or open-toe sandals, while your toe is healing.  You may use acetaminophen or ibuprofen to control pain, unless another medicine was prescribed. If you have chronic liver or kidney disease or ever had a stomach ulcer or GI bleeding, talk with your doctor before using these medicines.  Prevention  To prevent ingrown toenails:  Wear shoes that fit well. Avoid shoes that pinch the toes together.  When you trim your toenails, do not cut them too short. " Cut straight across at the top and do not round the edges.  Do not use a sharp object to clean under your nail since this might cause an infection.  At first signs of a recurrence, insert a small piece of cotton under that side of the nail to help it grow out straight.  Follow-up care  Follow up with your doctor or this facility as advised by our staff. If the ingrown toenail recurs, follow up with a podiatrist for nail bed ablation.  When to seek medical advice  Call your health care provider right away if any of the following occur:  Increasing redness, pain or swelling of the toe  Red streaks in the skin leading away from the wound  Continued pus or fluid drainage for more than 24 hours  Fever of 100.4º F (38º C) or higher, or as directed by your health care provider  © 5027-1380 [employer]. [address] [place] [address]. All rights reserved. This information is not intended as a substitute for professional medical care. Always follow your healthcare professional's instructions.        Shoe purchasing ideas: (try 6pm.Blinkiverse, zapposThe New York Times , [employer], or shoes.Blinkiverse for discounted prices) you can visit varsity shoes in [place] [place] [employer], DSW shoes in [place]  or Travel Beauty in the [facility] (there are also several shoe brand outlets in the [facility])    ONLY purchase stability style tennis shoes AVOID flex, foam, free, yoga mat style shoes or shoes that claim to feel like clouds  If you have a flatter foot purchase shoes for PRONATION  If you have a high arch purchase shoes for SUPINATION    Shoe examples:    Asics (GT or gel foundations, gel-kayano-30), new balance stability type shoes (such as the 940 series or the [id number]), france (Guide 16, stabil c3),  Timoteo (GTS or Beast or transcend), Trinidad garrett (makayla 7, arahi, mena) Vincent (tennis shoes and boots)    Sofft Brand (women) Katiana&Stu (men), bared, clarks, crocs, aerosoles, naturalizers, SAS,  ruben, emily yi choi, rockports (dress shoes)    Vionic, burkenstocks, fitflops, propet, taos, baretraps, oofos, Hoka or vionic recovery slides/sandals (sandals)    Hoka or vionic recovery slides/sandals, birkenstock rubber sandals, crocs(especially the recovery and support styles), propet. Bared, vionic slippers, PowerStep slippers, Aetrex slippers (house shoes)    Over the Counter Insert Recommendations (always go for FULL length inserts):  - Spenco brand (orthotic are or total support)  - SuperFeet (look for the ones that offer support and/or pain relief)  - Man   - PowerStep [place]  - OrthoFeet      Over the counter pain creams: Voltaren Gel, Biofreeze, Bengay, tiger balm, two old goat, lidocaine gel,  Absorbine Veterinary Liniment Gel Topical Analgesic Sore Muscle and Joint Pain Relief    Alternative Pain remedies: Omega-3 EFAs, tumeric with black pepper, green tea, Bromelain, Arnica, garlic    Anti-inflammatory foods  An anti-inflammatory diet should include these foods:  tomatoes  olive oil  green leafy vegetables, such as spinach, kale, and collards  nuts like almonds and walnuts  fatty fish like salmon, mackerel, tuna, and sardines  fruits such as strawberries, blueberries, cherries, and oranges   Foods that cause inflammation  Try to avoid or limit these foods as much as possible:  refined carbohydrates, such as white bread and pastries  French fries and other fried foods  soda and other sugar-sweetened beverages  red meat (burgers, steaks) and processed meat (hot dogs, sausage)  margarine, shortening, and lard          Recommend lotions: eucerin, eucerin for diabetics, aquaphor, A&D ointment, gold bond for diabetics, sween, Sutton's Bees all purpose baby ointment,  urea 40 with aloe or SkinIntegra rapid crack repair (found on amazon.com)

## 2025-01-31 ENCOUNTER — OFFICE VISIT (OUTPATIENT)
Facility: CLINIC | Age: 80
End: 2025-01-31
Payer: MEDICARE

## 2025-01-31 ENCOUNTER — PATIENT MESSAGE (OUTPATIENT)
Dept: PODIATRY | Facility: CLINIC | Age: 80
End: 2025-01-31
Payer: MEDICARE

## 2025-01-31 VITALS
OXYGEN SATURATION: 97 % | BODY MASS INDEX: 27.39 KG/M2 | HEART RATE: 73 BPM | HEIGHT: 61 IN | SYSTOLIC BLOOD PRESSURE: 146 MMHG | DIASTOLIC BLOOD PRESSURE: 70 MMHG | RESPIRATION RATE: 18 BRPM | WEIGHT: 145.06 LBS

## 2025-01-31 DIAGNOSIS — I82.90 ACUTE DEEP VEIN THROMBOSIS (DVT) OF NON-EXTREMITY VEIN: ICD-10-CM

## 2025-01-31 DIAGNOSIS — Z79.01 CURRENT LONG-TERM USE OF ANTICOAGULANT MEDICATION WITH HISTORY OF DEEP VENOUS THROMBOSIS (DVT): ICD-10-CM

## 2025-01-31 DIAGNOSIS — I50.32 CHRONIC DIASTOLIC HEART FAILURE: ICD-10-CM

## 2025-01-31 DIAGNOSIS — E78.2 ELEVATED TRIGLYCERIDES WITH HIGH CHOLESTEROL: ICD-10-CM

## 2025-01-31 DIAGNOSIS — I10 PRIMARY HYPERTENSION: ICD-10-CM

## 2025-01-31 DIAGNOSIS — I77.82 ANTINEUTROPHILIC CYTOPLASMIC ANTIBODY (ANCA) VASCULITIS: Primary | ICD-10-CM

## 2025-01-31 DIAGNOSIS — Z86.718 CURRENT LONG-TERM USE OF ANTICOAGULANT MEDICATION WITH HISTORY OF DEEP VENOUS THROMBOSIS (DVT): ICD-10-CM

## 2025-01-31 DIAGNOSIS — E78.5 DYSLIPIDEMIA: ICD-10-CM

## 2025-01-31 DIAGNOSIS — Z87.440 HISTORY OF URINARY TRACT INFECTION: ICD-10-CM

## 2025-01-31 DIAGNOSIS — N18.6 ESRD (END STAGE RENAL DISEASE): ICD-10-CM

## 2025-01-31 DIAGNOSIS — E03.9 HYPOTHYROIDISM, UNSPECIFIED TYPE: ICD-10-CM

## 2025-01-31 PROCEDURE — 3288F FALL RISK ASSESSMENT DOCD: CPT | Mod: CPTII,S$GLB,, | Performed by: STUDENT IN AN ORGANIZED HEALTH CARE EDUCATION/TRAINING PROGRAM

## 2025-01-31 PROCEDURE — 1101F PT FALLS ASSESS-DOCD LE1/YR: CPT | Mod: CPTII,S$GLB,, | Performed by: STUDENT IN AN ORGANIZED HEALTH CARE EDUCATION/TRAINING PROGRAM

## 2025-01-31 PROCEDURE — 3077F SYST BP >= 140 MM HG: CPT | Mod: CPTII,S$GLB,, | Performed by: STUDENT IN AN ORGANIZED HEALTH CARE EDUCATION/TRAINING PROGRAM

## 2025-01-31 PROCEDURE — 1126F AMNT PAIN NOTED NONE PRSNT: CPT | Mod: CPTII,S$GLB,, | Performed by: STUDENT IN AN ORGANIZED HEALTH CARE EDUCATION/TRAINING PROGRAM

## 2025-01-31 PROCEDURE — 99215 OFFICE O/P EST HI 40 MIN: CPT | Mod: S$GLB,,, | Performed by: STUDENT IN AN ORGANIZED HEALTH CARE EDUCATION/TRAINING PROGRAM

## 2025-01-31 PROCEDURE — 1123F ACP DISCUSS/DSCN MKR DOCD: CPT | Mod: CPTII,S$GLB,, | Performed by: STUDENT IN AN ORGANIZED HEALTH CARE EDUCATION/TRAINING PROGRAM

## 2025-01-31 PROCEDURE — G2211 COMPLEX E/M VISIT ADD ON: HCPCS | Mod: S$GLB,,, | Performed by: STUDENT IN AN ORGANIZED HEALTH CARE EDUCATION/TRAINING PROGRAM

## 2025-01-31 PROCEDURE — 3078F DIAST BP <80 MM HG: CPT | Mod: CPTII,S$GLB,, | Performed by: STUDENT IN AN ORGANIZED HEALTH CARE EDUCATION/TRAINING PROGRAM

## 2025-01-31 PROCEDURE — 99999 PR PBB SHADOW E&M-EST. PATIENT-LVL V: CPT | Mod: PBBFAC,,, | Performed by: STUDENT IN AN ORGANIZED HEALTH CARE EDUCATION/TRAINING PROGRAM

## 2025-01-31 PROCEDURE — 99417 PROLNG OP E/M EACH 15 MIN: CPT | Mod: ,,, | Performed by: STUDENT IN AN ORGANIZED HEALTH CARE EDUCATION/TRAINING PROGRAM

## 2025-01-31 RX ORDER — AMLODIPINE BESYLATE 5 MG/1
5 TABLET ORAL DAILY
Qty: 90 TABLET | Refills: 3 | Status: SHIPPED | OUTPATIENT
Start: 2025-01-31 | End: 2026-01-31

## 2025-01-31 RX ORDER — PREDNISONE 2.5 MG/1
5 TABLET ORAL
COMMUNITY
Start: 2025-01-07
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
## 2025-01-31 NOTE — PATIENT INSTRUCTIONS
Thank you so much for coming to see me today for a visit.  Please call with any questions or concerns. Have a good day.

## 2025-02-03 DIAGNOSIS — Z79.01 CURRENT LONG-TERM USE OF ANTICOAGULANT MEDICATION WITH HISTORY OF DEEP VENOUS THROMBOSIS (DVT): ICD-10-CM

## 2025-02-03 DIAGNOSIS — Z86.718 CURRENT LONG-TERM USE OF ANTICOAGULANT MEDICATION WITH HISTORY OF DEEP VENOUS THROMBOSIS (DVT): ICD-10-CM

## 2025-02-05 ENCOUNTER — OFFICE VISIT (OUTPATIENT)
Dept: HEMATOLOGY/ONCOLOGY | Facility: CLINIC | Age: 80
End: 2025-02-05
Payer: MEDICARE

## 2025-02-05 VITALS
OXYGEN SATURATION: 96 % | HEART RATE: 72 BPM | WEIGHT: 142.19 LBS | DIASTOLIC BLOOD PRESSURE: 80 MMHG | BODY MASS INDEX: 26.85 KG/M2 | SYSTOLIC BLOOD PRESSURE: 154 MMHG | TEMPERATURE: 98 F | HEIGHT: 61 IN

## 2025-02-05 DIAGNOSIS — Z79.01 ANTICOAGULATED: ICD-10-CM

## 2025-02-05 DIAGNOSIS — Z76.89 ENCOUNTER TO ESTABLISH CARE: ICD-10-CM

## 2025-02-05 DIAGNOSIS — I82.90 ACUTE DEEP VEIN THROMBOSIS (DVT) OF NON-EXTREMITY VEIN: Primary | ICD-10-CM

## 2025-02-05 PROCEDURE — 3079F DIAST BP 80-89 MM HG: CPT | Mod: CPTII,S$GLB,, | Performed by: STUDENT IN AN ORGANIZED HEALTH CARE EDUCATION/TRAINING PROGRAM

## 2025-02-05 PROCEDURE — 1101F PT FALLS ASSESS-DOCD LE1/YR: CPT | Mod: CPTII,S$GLB,, | Performed by: STUDENT IN AN ORGANIZED HEALTH CARE EDUCATION/TRAINING PROGRAM

## 2025-02-05 PROCEDURE — 99999 PR PBB SHADOW E&M-EST. PATIENT-LVL V: CPT | Mod: PBBFAC,,, | Performed by: STUDENT IN AN ORGANIZED HEALTH CARE EDUCATION/TRAINING PROGRAM

## 2025-02-05 PROCEDURE — 1159F MED LIST DOCD IN RCRD: CPT | Mod: CPTII,S$GLB,, | Performed by: STUDENT IN AN ORGANIZED HEALTH CARE EDUCATION/TRAINING PROGRAM

## 2025-02-05 PROCEDURE — 3288F FALL RISK ASSESSMENT DOCD: CPT | Mod: CPTII,S$GLB,, | Performed by: STUDENT IN AN ORGANIZED HEALTH CARE EDUCATION/TRAINING PROGRAM

## 2025-02-05 PROCEDURE — 3077F SYST BP >= 140 MM HG: CPT | Mod: CPTII,S$GLB,, | Performed by: STUDENT IN AN ORGANIZED HEALTH CARE EDUCATION/TRAINING PROGRAM

## 2025-02-05 PROCEDURE — 1126F AMNT PAIN NOTED NONE PRSNT: CPT | Mod: CPTII,S$GLB,, | Performed by: STUDENT IN AN ORGANIZED HEALTH CARE EDUCATION/TRAINING PROGRAM

## 2025-02-05 PROCEDURE — 1157F ADVNC CARE PLAN IN RCRD: CPT | Mod: CPTII,S$GLB,, | Performed by: STUDENT IN AN ORGANIZED HEALTH CARE EDUCATION/TRAINING PROGRAM

## 2025-02-05 PROCEDURE — 99205 OFFICE O/P NEW HI 60 MIN: CPT | Mod: S$GLB,,, | Performed by: STUDENT IN AN ORGANIZED HEALTH CARE EDUCATION/TRAINING PROGRAM

## 2025-02-06 ENCOUNTER — OFFICE VISIT (OUTPATIENT)
Dept: PODIATRY | Facility: CLINIC | Age: 80
End: 2025-02-06
Payer: MEDICARE

## 2025-02-06 VITALS
BODY MASS INDEX: 26.85 KG/M2 | HEART RATE: 78 BPM | HEIGHT: 61 IN | DIASTOLIC BLOOD PRESSURE: 68 MMHG | SYSTOLIC BLOOD PRESSURE: 144 MMHG | WEIGHT: 142.19 LBS

## 2025-02-06 DIAGNOSIS — Z98.890 S/P NAIL SURGERY: Primary | ICD-10-CM

## 2025-02-06 PROCEDURE — 1125F AMNT PAIN NOTED PAIN PRSNT: CPT | Mod: CPTII,S$GLB,, | Performed by: PODIATRIST

## 2025-02-06 PROCEDURE — 1101F PT FALLS ASSESS-DOCD LE1/YR: CPT | Mod: CPTII,S$GLB,, | Performed by: PODIATRIST

## 2025-02-06 PROCEDURE — 3288F FALL RISK ASSESSMENT DOCD: CPT | Mod: CPTII,S$GLB,, | Performed by: PODIATRIST

## 2025-02-06 PROCEDURE — 1160F RVW MEDS BY RX/DR IN RCRD: CPT | Mod: CPTII,S$GLB,, | Performed by: PODIATRIST

## 2025-02-06 PROCEDURE — 1157F ADVNC CARE PLAN IN RCRD: CPT | Mod: CPTII,S$GLB,, | Performed by: PODIATRIST

## 2025-02-06 PROCEDURE — 99024 POSTOP FOLLOW-UP VISIT: CPT | Mod: S$GLB,,, | Performed by: PODIATRIST

## 2025-02-06 PROCEDURE — 3077F SYST BP >= 140 MM HG: CPT | Mod: CPTII,S$GLB,, | Performed by: PODIATRIST

## 2025-02-06 PROCEDURE — 3078F DIAST BP <80 MM HG: CPT | Mod: CPTII,S$GLB,, | Performed by: PODIATRIST

## 2025-02-06 PROCEDURE — 1159F MED LIST DOCD IN RCRD: CPT | Mod: CPTII,S$GLB,, | Performed by: PODIATRIST

## 2025-02-06 PROCEDURE — 99999 PR PBB SHADOW E&M-EST. PATIENT-LVL V: CPT | Mod: PBBFAC,,, | Performed by: PODIATRIST

## 2025-02-06 NOTE — PROGRESS NOTES
SUBJECTIVE: Patient returns to the clinic 1 week status post nail procedure.  Patient has no complaints of fever chills or sweats.  moderate pain, more than when don on the left hallux.  Patient has been dressing as instructed. She admits that she wears a flat sandal in the home.     PAST MEDICAL HISTORY: Unchanged    General: Pt. is well-developed, well-nourished, appears stated age, in no acute distress, alert and oriented x 3. No evidence of depression, anxiety, or agitation. Calm, cooperative, and communicative. Appropriate interactions and affect.    Vascular: dorsalis pedis and posterior tibial pulses are palpable bilaterally. Capillary refill time is within normal limits.    Surgical Site  Signs of infection: tenderness.  No fluctuance, no crepitus, no malodor, no purulence  Drainage:  minimal  Periwound: Normal        IMPRESSION: 1 week status post nail procedure.with satisfactory progress no signs of infection.    PLAN: Patient to continue local care until completely healed with no drainage and no redness.  Recommend patient wear a Darco shoe in the home in lieu of the flat nonsupportive slipper that may be putting excess stress and pressure to the surgical site.  Patient should call the clinic immediately if any signs of infection such as fever chills sweats increased redness or pain but was otherwise RTC 2-3 weeks

## 2025-02-06 NOTE — PATIENT INSTRUCTIONS
Shoe purchasing ideas: (try 6pm.Berry Kitchen, zappos.Berry Kitchen , nordstromrack.Berry Kitchen, or shoes.Berry Kitchen for discounted prices) you can visit varsity shoes in [facility] Shoe [facility], DSW shoes in [place]  or jewels rack in the [facility] (there are also several shoe brand outlets in the [facility])    ONLY purchase stability style tennis shoes AVOID flex, foam, free, yoga mat style shoes or shoes that claim to feel like clouds  If you have a flatter foot purchase shoes for PRONATION  If you have a high arch purchase shoes for SUPINATION    Shoe examples:    Asics (GT or gel foundations, gel-kayano-30), new balance stability type shoes (such as the 940 series or the C901w12), saucony (Guide 16, stabil c3),  Mahmood (GTS or Beast or transcend), propet, HokaOne (makayla 7, arahi, mena) Vincent (tennis shoes and boots)    Sofft Brand (women) Katiana&Stu (men), barejuan, clarks, crocs, aerosoles, naturalizers, SAS, ecco, born, emily choi, rockports (dress shoes)    Vionic, burkenstocks, fitflops, propet, taos, baretraps, oofos, Hoka or vionic recovery slides/sandals (sandals)    Hoka or vionic recovery slides/sandals, birkenstock rubber sandals, crocs(especially the recovery and support styles), propet. Bared, vionic slippers, PowerStep slippers, Aetrex slippers (house shoes)    Over the Counter Insert Recommendations (always go for FULL length inserts):  - Spenco brand (orthotic are or total support)  - SuperFeet (look for the ones that offer support and/or pain relief)  - Donovan   - PowerStep Hiawatha  - OrthoFeet      Over the counter pain creams: Voltaren Gel, Biofreeze, Bengay, tiger balm, two old goat, lidocaine gel,  Absorbine Veterinary Liniment Gel Topical Analgesic Sore Muscle and Joint Pain Relief    Alternative Pain remedies: Omega-3 EFAs, tumeric with black pepper, green tea, Bromelain, Arnica, garlic    Anti-inflammatory foods  An anti-inflammatory diet should include these foods:  tomatoes  olive oil  green leafy  vegetables, such as spinach, kale, and collards  nuts like almonds and walnuts  fatty fish like salmon, mackerel, tuna, and sardines  fruits such as strawberries, blueberries, cherries, and oranges   Foods that cause inflammation  Try to avoid or limit these foods as much as possible:  refined carbohydrates, such as white bread and pastries  French fries and other fried foods  soda and other sugar-sweetened beverages  red meat (burgers, steaks) and processed meat (hot dogs, sausage)  margarine, shortening, and lard          Recommend lotions: eucerin, eucerin for diabetics, aquaphor, A&D ointment, gold bond for diabetics, sween, Nas's Bees all purpose baby ointment,  urea 40 with aloe or SkinIntegra rapid crack repair (found on amazon.com)

## 2025-02-09 PROBLEM — E78.5 DYSLIPIDEMIA: Status: ACTIVE | Noted: 2025-02-09

## 2025-02-09 PROBLEM — Z87.440 HISTORY OF URINARY TRACT INFECTION: Status: ACTIVE | Noted: 2025-02-09

## 2025-02-09 NOTE — ASSESSMENT & PLAN NOTE
Causing ESRD    Continue Prednisone 2.5 mg daily until current prescription is finished, then discontinue.   Rituximab infusions every 6 months, with the last one in November.   Recent labs showed no signs of inflammation.   Continue Atovaquone liquid suspension daily,  for PJP prophylaxis

## 2025-02-12 ENCOUNTER — TELEPHONE (OUTPATIENT)
Dept: VASCULAR SURGERY | Facility: CLINIC | Age: 80
End: 2025-02-12
Payer: MEDICARE

## 2025-02-13 ENCOUNTER — OFFICE VISIT (OUTPATIENT)
Dept: UROLOGY | Facility: CLINIC | Age: 80
End: 2025-02-13
Payer: MEDICARE

## 2025-02-13 DIAGNOSIS — I82.90 ACUTE DEEP VEIN THROMBOSIS (DVT) OF NON-EXTREMITY VEIN: ICD-10-CM

## 2025-02-13 DIAGNOSIS — R39.9 UTI SYMPTOMS: Primary | ICD-10-CM

## 2025-02-13 DIAGNOSIS — R35.1 NOCTURIA: ICD-10-CM

## 2025-02-13 PROCEDURE — 1126F AMNT PAIN NOTED NONE PRSNT: CPT | Mod: CPTII,S$GLB,, | Performed by: UROLOGY

## 2025-02-13 PROCEDURE — 99999 PR PBB SHADOW E&M-EST. PATIENT-LVL III: CPT | Mod: PBBFAC,,, | Performed by: UROLOGY

## 2025-02-13 PROCEDURE — 1157F ADVNC CARE PLAN IN RCRD: CPT | Mod: CPTII,S$GLB,, | Performed by: UROLOGY

## 2025-02-13 PROCEDURE — 1160F RVW MEDS BY RX/DR IN RCRD: CPT | Mod: CPTII,S$GLB,, | Performed by: UROLOGY

## 2025-02-13 PROCEDURE — 99214 OFFICE O/P EST MOD 30 MIN: CPT | Mod: S$GLB,,, | Performed by: UROLOGY

## 2025-02-13 PROCEDURE — 1101F PT FALLS ASSESS-DOCD LE1/YR: CPT | Mod: CPTII,S$GLB,, | Performed by: UROLOGY

## 2025-02-13 PROCEDURE — 1159F MED LIST DOCD IN RCRD: CPT | Mod: CPTII,S$GLB,, | Performed by: UROLOGY

## 2025-02-13 PROCEDURE — 3288F FALL RISK ASSESSMENT DOCD: CPT | Mod: CPTII,S$GLB,, | Performed by: UROLOGY

## 2025-02-13 RX ORDER — ESTRADIOL 0.1 MG/G
1 CREAM VAGINAL
Qty: 42.5 G | Refills: 1 | Status: SHIPPED | OUTPATIENT
Start: 2025-02-14 | End: 2026-02-14
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
## 2025-02-27 ENCOUNTER — OFFICE VISIT (OUTPATIENT)
Dept: PODIATRY | Facility: CLINIC | Age: 80
End: 2025-02-27
Payer: MEDICARE

## 2025-02-27 VITALS — DIASTOLIC BLOOD PRESSURE: 72 MMHG | SYSTOLIC BLOOD PRESSURE: 161 MMHG | HEART RATE: 68 BPM

## 2025-02-27 DIAGNOSIS — Z98.890 S/P NAIL SURGERY: Primary | ICD-10-CM

## 2025-02-27 PROCEDURE — 99999 PR PBB SHADOW E&M-EST. PATIENT-LVL IV: CPT | Mod: PBBFAC,,, | Performed by: PODIATRIST

## 2025-02-27 NOTE — PROGRESS NOTES
SUBJECTIVE: Patient returns to the clinic status post nail procedure 01/29/2025.  Patient has no complaints of fever chills or sweats.  moderate pain, more than when don on the left hallux.  Patient has been dressing as instructed.      PAST MEDICAL HISTORY: Unchanged    General: Pt. is well-developed, well-nourished, appears stated age, in no acute distress, alert and oriented x 3. No evidence of depression, anxiety, or agitation. Calm, cooperative, and communicative. Appropriate interactions and affect.    Vascular: dorsalis pedis and posterior tibial pulses are palpable bilaterally. Capillary refill time is within normal limits.    Surgical Site  Signs of infection: No fluctuance, no crepitus, no malodor, no purulence.  Drainage:  none  Periwound: Normal    02/27/2025 02/06/2025        IMPRESSION: Status post nail procedure.with satisfactory progress no signs of infection.    PLAN:  Wound has healed well with no signs of continued skin breakdown noted. Skin is still delicate therefore patient must be diligent in avoiding excessive pressure and making sure there is adequate support and padding in shoe gear.  Patient should call the clinic immediately if any signs of infection such as fever chills sweats increased redness or pain but was otherwise RTC PRN

## 2025-03-05 NOTE — ASSESSMENT & PLAN NOTE
- goal hemoglobin 10-12,   - most recent iron panel with iron 15, transferrin 138, TIBC 204, 7% saturation and ferritin 378  - transfuse for hemoglobin < 7.0  - defer IV iron in light of bacteremia, currently receiving EPO   2025    Anderson Farr MD  [address] [address]    Patient: Rosita Soriano   YOB: 1946   Date of Visit: 3/5/2025     Encounter Diagnosis     ICD-10-CM    1. Unilateral primary osteoarthritis, right knee  M17.11       2. S/P total knee replacement, right  Z96.651       3. Impaired functional mobility, balance, gait, and endurance  Z74.09           Dear Dr. Farr:    Thank you for your recent referral of Rosita Soriano. Please review the attached evaluation summary from Rosita's recent visit.     Please verify that you agree with the plan of care by signing the attached order.     If you have any questions or concerns, please do not hesitate to call.     I sincerely appreciate the opportunity to share in the care of one of your patients and hope to have another opportunity to work with you in the near future.       Sincerely,    Jes Helm, PT      Referring Provider:      I certify that I have read the below Plan of Care and certify the need for these services furnished under this plan of treatment while under my care.                    Anderson Farr MD  [address] [address]  Via Fax: [phone]          PT Re-Evaluation     Today's date: 3/5/2025  Patient name: Rosita Soriano  : 1946  MRN: [id number]  Referring provider: Anderson Farr MD  Dx:   Encounter Diagnosis     ICD-10-CM    1. Unilateral primary osteoarthritis, right knee  M17.11       2. S/P total knee replacement, right  Z96.651       3. Impaired functional mobility, balance, gait, and endurance  Z74.09                      Assessment  Impairments: abnormal gait, abnormal or restricted ROM, abnormal movement, activity intolerance, impaired balance, impaired physical strength, lacks appropriate home exercise program, pain with function, weight-bearing intolerance and activity limitations    Assessment details: The patient is a 77 y/o female who presents to [facility] s/p R TKR on  3/3/25 by Dr. Farr.  She has complaints of constant pain since surgery.  She demonstrates deficits with decreased A/PROM and strength, decreased flexibility, edema, decreased balance and proprioception and TTP.  These deficits lead to antalgic gait with use of AD, difficulty with transfers, limited standing tolerance, difficulty with stair negotiation and decreased tolerance to functional activities.  She ambulates with RW for household and community distances, slow renuka noted along with decreased weight shift to RLE in stance phase.  She also lacks knee flexion in swing phase and TKE with heel strike.  She has not done the steps at home, has been staying on the first floor.  Secondary to surgery and above deficits she is limited with her overall mobility and function.  The patient would benefit from continued PT to address deficits and improve function.  Tx to include ROM, stretching, strengthening, modalities, HEP, pt education, postural ed, lifting/body mechanics, neuro re-ed, balance/proprioception Te, MT and equipment.    Understanding of Dx/Px/POC: good     Prognosis: good    Goals  STG: (6 weeks)  1.) Patient will initiate HEP Independently   2.) Patient will have a 50% reduction in overall symptoms   3.) Patient will demonstrate improved L knee strength evidenced by a 1-2 MMT grade increase  4.) Patient will demonstrate L knee flexion AROM 0-90 deg  5.) Patient will ambulate 300ft w/ SPC  6.) Patient will demonstrate improved standing tolerance >/= 1 hour   7.) Patient will demonstrate improved L hip strength evidenced by a 1-2 MMT grade increase    LTG: (12 weeks)  1.) Patient will be d/c to an HEP independently  2.) Patient will improve functional abilities evidenced by FOTO scores  3.) Eliminate pain with functional activity   4.) Patient will demonstrate improved ability to lift, push, pull, and carry safely  5.) Return to PLOF   6.) Patient will demonstrate L knee flexion AROM 0- >/= 110 deg  7.)  Patient will ambulate 300ft w/o AD   8.)  Decrease edema R knee by > 3 cm to help improve flexibility.      Plan  Patient would benefit from: skilled physical therapy  Planned modality interventions: cryotherapy and thermotherapy: hydrocollator packs    Planned therapy interventions: functional ROM exercises, graded activity, graded exercise, graded motor, home exercise program, flexibility, joint mobilization, manual therapy, neuromuscular re-education, patient education, self care, strengthening, stretching, therapeutic activities, therapeutic exercise, therapeutic training, balance, balance/weight bearing training, postural training, patient/caregiver education, transfer training and gait training    Frequency: 2-3x week  Duration in weeks: 12  Plan of Care beginning date: 3/5/2025  Plan of Care expiration date: 5/28/2025  Treatment plan discussed with: patient  Plan details: Plan of care was reviewed and discussed thoroughly with the patient on their current condition. Patient was instructed on a HEP with written instructions. Patient is in agreement with PT recommendations and will attend Physical Therapy 2-3x/week for the next 10 weeks to address current deficits.        Subjective Evaluation    History of Present Illness  Mechanism of injury: surgery  Mechanism of injury: The patient reports today pre-operative R knee TKA procedure scheduled on 3/3 with Dr. Farr. Patient notes experiencing R knee pain for over 10 years with no precipitating injury or trauma to the region. She notes associated R knee buckling and weakness. She notes pain is aggravated by walking, lifting, pushing, pulling, pulling, and carrying. Patient lives in a two story home with one step to enter. She has a RW and SPC. Her daughter lives close by and is able to help assist patient post-operatively. She is now referred to [facility]PP[facility] for pre and post operative R TKA rehabilitation.    UPDATE 3/5/25:  The patient states that she had R TKR by  "Dr. Farr on 3/3.  She stayed one night with D/C to home yesterday.  The patient states that her knee pain has been constant since surgery.  Pain is in her thigh and into her knee.  Pain along her medial knee.  She denies any N&T into her R knee.     She is unsure of the day of her follow up appointment with the doctor.            Recurrent probem    Quality of life: good    Patient Goals  Patient goals for therapy: decreased pain, decreased edema, improved balance, increased motion, increased strength, independence with ADLs/IADLs and return to sport/leisure activities  Patient goal: \"To get more movement and strength back in my leg.\"  Pain  At best pain ratin  At worst pain rating: 10  Location: R Knee and thigh  Quality: dull ache, burning, discomfort, sharp and tight  Relieving factors: medications and ice  Aggravating factors: sitting, standing, walking and stair climbing (transferring, carrying)    Social Support  Steps to enter house: yes  Stairs in house: yes   Lives in: multiple-level home  Lives with: Daughter lives close.    Employment status: working    Diagnostic Tests  X-ray: abnormal  Treatments  Current treatment: physical therapy      Objective     Observations     Right Knee   Positive for edema, effusion and incision.     Additional Observation Details  Incision present along anterior knee, covered with bandage.  No active bleeding or drainage noted, no redness, slightly warm.     Tenderness     Right Knee   Tenderness in the lateral joint line and medial joint line.     Neurological Testing     Sensation     Knee   Left Knee   Intact: Light touch    Right Knee   Intact: light touch     Active Range of Motion   Left Knee   Flexion: 130 degrees   Extensor la degrees     Right Knee   Flexion: 60 degrees with pain  Extension: -12 degrees with pain    Passive Range of Motion     Right Knee   Flexion: 67 degrees with pain  Extension: -10 degrees with pain    Mobility   Patellar Mobility: " "    Right Knee   Hypomobile: medial, lateral, superior and inferior     Patellar Static Positioning   Left Knee: WFL  Right Knee: WFL    Strength/Myotome Testing     Left Knee   Flexion: 5  Extension: 5  Quadriceps contraction: good    Right Knee   Flexion: 2+  Extension: 2+  Quadriceps contraction: poor    Swelling     Left Knee Girth Measurement (cm)   Joint line: 42 cm    Right Knee Girth Measurement (cm)   Joint line: 47 cm    Ambulation   Weight-Bearing Status   Assistive device used: two-wheeled walker    Additional Weight-Bearing Status Details  Using RW for household and community distances.      Observational Gait   Decreased walking speed and right stance time.     Additional Observational Gait Details  Lacks knee flexion in swing phase and TKE with heel strike.               Precautions: R TKA (3/3)      Manuals 2/24 3/5     R Knee PROM        R Knee Patellar Mobs       R Hamstring/Gastroc Stretch         R knee STM        Neuro Re-Ed                                                        Ther Ex       Ankle Pumps X30  2x10     Heel Slides  2x10 w/ sliding board & strap  2x10 with board    Hip Abd on board 10x     Quad Sets 5\" Hold x10  5\"x10     Hamstring Stretch  Reviewed      Gastroc Stretch  Reviewed       Seated Assisted Knee Flexion Stretch  10\" Hold x10       Standing Knee Flexion Stretch        LAQ       SAQ  Unable     SLR       TKE       Mini Squats       Mini Forward Lunges       NU Step for muscular endurance & ROM       HEP Instruction  BM ML     Ther Activity       Fwd/Bwd Walking       Forward Step Ups        Forward Step Downs        Side Stepping        Sit to Stands        Gait Training       SPC        No AD       Modalities       Ice   5' post               Access Code: [email or web address]  URL: [email or web address]  Date: 03/05/2025  Prepared by: Jes    Exercises  - Long Sitting Quad Set  - 1 x daily - 7 x weekly - 2 sets - 10 reps - 3\" hold  - Hooklying Gluteal Sets  - 1 x " "daily - 7 x weekly - 2 sets - 10 reps - 3\" hold  - Supine Ankle Pumps  - 1 x daily - 7 x weekly - 3 sets - 10 reps  - Supine Heel Slide  - 1 x daily - 7 x weekly - 2 sets - 10 reps                  "

## 2025-03-06 ENCOUNTER — TELEPHONE (OUTPATIENT)
Dept: VASCULAR SURGERY | Facility: CLINIC | Age: 80
End: 2025-03-06
Payer: MEDICARE

## 2025-03-06 NOTE — TELEPHONE ENCOUNTER
----- Message from Faraz sent at 3/6/2025  2:08 PM CST -----  Regarding: [facility]  Patient is requesting a sooner appointment.  Patient declined first available appointment listed as well as another facility and provider .  Patient will not accept being placed on the waitlist and is requesting a message be sent to doctor.Name of Caller: Ruby When is the first available appointment? 04/11 US appt needs to be pushed up Symptoms:  pts access port needs attention Would the patient rather a call back or a response via [facility]?  Call back Best Call Back Number:  [phone] Information:

## 2025-03-06 NOTE — TELEPHONE ENCOUNTER
Called and spoke with Ruby at [facility]. States pt.access flow decreasing where need to repeat labs and pt.having bleeding post dialysis. She goes to dialysis twice a week on Tuesday & Saturday @ 11 am. Pt. has been able to get full treatment. I called to speak with pt.  I spoke with her dgtr. Roro. and aware that dialysis nurse called. I scheduled an appt. with vascular provider for next week and awaiting to see if can get an ultrasound test done prior. We will call once appt. scheduled. If pt. has issues prior she needs to go to the ER. Dgtr.verbalized understanding. Dgtr.states she will be out of town from Friday to Monday. Message sent to provider.

## 2025-03-10 ENCOUNTER — TELEPHONE (OUTPATIENT)
Dept: VASCULAR SURGERY | Facility: CLINIC | Age: 80
End: 2025-03-10
Payer: MEDICARE

## 2025-03-10 NOTE — TELEPHONE ENCOUNTER
Called and spoke with pt. dgtr.Roro and aware of ultrasound test scheduled prior to her appt. with vascular. Dgtr.verbalized understanding and aware of location of appts. scheduled.

## 2025-03-13 ENCOUNTER — OFFICE VISIT (OUTPATIENT)
Dept: VASCULAR SURGERY | Facility: CLINIC | Age: 80
End: 2025-03-13
Payer: MEDICARE

## 2025-03-13 ENCOUNTER — HOSPITAL ENCOUNTER (OUTPATIENT)
Dept: CARDIOLOGY | Facility: HOSPITAL | Age: 80
Discharge: HOME | End: 2025-03-13
Attending: NURSE PRACTITIONER
Payer: MEDICARE

## 2025-03-13 VITALS
HEIGHT: 61 IN | HEART RATE: 70 BPM | WEIGHT: 148.38 LBS | BODY MASS INDEX: 28.01 KG/M2 | SYSTOLIC BLOOD PRESSURE: 123 MMHG | DIASTOLIC BLOOD PRESSURE: 75 MMHG

## 2025-03-13 DIAGNOSIS — N18.6 ESRD (END STAGE RENAL DISEASE): Primary | ICD-10-CM

## 2025-03-13 DIAGNOSIS — T82.858D ARTERIOVENOUS FISTULA STENOSIS, SUBSEQUENT ENCOUNTER: ICD-10-CM

## 2025-03-13 LAB
HD NATIVE ARTERY VESSEL: NORMAL
HD OUTFLOW VEIN VESSEL: NORMAL
LEFT DIST ANA PSV: 170 CM/S
LEFT DIST GRAFT PSV: 195 CM/S
LEFT INFLOW PSV: 246 CM/S
LEFT MID GRAFT PSV: 179 CM/S
LEFT OUTFLOW PSV: 132 CM/S
LEFT PROX ANA PSV: 663 CM/S
LEFT PROX GRAFT PSV: 416 CM/S
LEFT VOLUME FLOW PSV: 2021 ML/MIN

## 2025-03-13 PROCEDURE — 3078F DIAST BP <80 MM HG: CPT | Mod: CPTII,S$GLB,, | Performed by: SURGERY

## 2025-03-13 PROCEDURE — 1101F PT FALLS ASSESS-DOCD LE1/YR: CPT | Mod: CPTII,S$GLB,, | Performed by: SURGERY

## 2025-03-13 PROCEDURE — 3288F FALL RISK ASSESSMENT DOCD: CPT | Mod: CPTII,S$GLB,, | Performed by: SURGERY

## 2025-03-13 PROCEDURE — 3074F SYST BP LT 130 MM HG: CPT | Mod: CPTII,S$GLB,, | Performed by: SURGERY

## 2025-03-13 PROCEDURE — 99215 OFFICE O/P EST HI 40 MIN: CPT | Mod: S$GLB,,, | Performed by: SURGERY

## 2025-03-13 PROCEDURE — 99999 PR PBB SHADOW E&M-EST. PATIENT-LVL III: CPT | Mod: PBBFAC,,, | Performed by: SURGERY

## 2025-03-13 PROCEDURE — 93990 DOPPLER FLOW TESTING: CPT | Mod: TC

## 2025-03-13 PROCEDURE — 1157F ADVNC CARE PLAN IN RCRD: CPT | Mod: CPTII,S$GLB,, | Performed by: SURGERY

## 2025-03-13 PROCEDURE — 1159F MED LIST DOCD IN RCRD: CPT | Mod: CPTII,S$GLB,, | Performed by: SURGERY

## 2025-03-13 PROCEDURE — 1126F AMNT PAIN NOTED NONE PRSNT: CPT | Mod: CPTII,S$GLB,, | Performed by: SURGERY
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
## 2025-03-19 NOTE — PROGRESS NOTES
Is This A New Presentation, Or A Follow-Up?: Phototherapy Treatment Patient arrived in a wheelchair to dialysis unit.   Report received as documented in PER Handoff on Doc Flowsheet.  VS's per Doc Flowsheet.    Acute Hemodialysis initiated using the following:    Dialysis Access: right dialysis catheter    Will Maintain telemetry and blood pressure monitoring throughout treatment.  Refer to flowsheet and MAR for details.

## 2025-03-26 ENCOUNTER — LAB VISIT (OUTPATIENT)
Dept: LAB | Facility: HOSPITAL | Age: 80
End: 2025-03-26
Attending: INTERNAL MEDICINE
Payer: MEDICARE

## 2025-03-26 ENCOUNTER — OFFICE VISIT (OUTPATIENT)
Dept: RHEUMATOLOGY | Facility: CLINIC | Age: 80
End: 2025-03-26
Payer: MEDICARE

## 2025-03-26 VITALS
SYSTOLIC BLOOD PRESSURE: 148 MMHG | BODY MASS INDEX: 27.88 KG/M2 | HEIGHT: 61 IN | HEART RATE: 70 BPM | DIASTOLIC BLOOD PRESSURE: 77 MMHG | WEIGHT: 147.69 LBS

## 2025-03-26 DIAGNOSIS — N05.8 PRIMARY PAUCI-IMMUNE NECROTIZING AND CRESCENTIC GLOMERULONEPHRITIS: ICD-10-CM

## 2025-03-26 DIAGNOSIS — Z79.899 HIGH RISK MEDICATION USE: ICD-10-CM

## 2025-03-26 DIAGNOSIS — N05.7 PRIMARY PAUCI-IMMUNE NECROTIZING AND CRESCENTIC GLOMERULONEPHRITIS: ICD-10-CM

## 2025-03-26 DIAGNOSIS — I77.82 ANTINEUTROPHILIC CYTOPLASMIC ANTIBODY (ANCA) VASCULITIS: Primary | ICD-10-CM

## 2025-03-26 DIAGNOSIS — I77.82 ANTINEUTROPHILIC CYTOPLASMIC ANTIBODY (ANCA) VASCULITIS: ICD-10-CM

## 2025-03-26 PROBLEM — F15.20 OTHER STIMULANT DEPENDENCE, UNCOMPLICATED: Status: RESOLVED | Noted: 2024-01-23 | Resolved: 2025-03-26

## 2025-03-26 PROBLEM — R65.20 SEVERE SEPSIS: Status: RESOLVED | Noted: 2024-11-25 | Resolved: 2025-03-26

## 2025-03-26 PROBLEM — A41.9 SEVERE SEPSIS: Status: RESOLVED | Noted: 2024-11-25 | Resolved: 2025-03-26

## 2025-03-26 PROBLEM — Z78.9 ALTERATION IN INSTRUMENTAL ACTIVITIES OF DAILY LIVING (IADL): Status: RESOLVED | Noted: 2022-09-12 | Resolved: 2025-03-26

## 2025-03-26 PROBLEM — Z09 HOSPITAL DISCHARGE FOLLOW-UP: Status: RESOLVED | Noted: 2024-12-02 | Resolved: 2025-03-26

## 2025-03-26 PROBLEM — R78.81 GRAM-NEGATIVE BACTEREMIA: Status: RESOLVED | Noted: 2024-11-29 | Resolved: 2025-03-26

## 2025-03-26 PROBLEM — Z71.89 ADVANCE CARE PLANNING: Status: RESOLVED | Noted: 2024-11-25 | Resolved: 2025-03-26

## 2025-03-26 PROBLEM — N39.0 UTI (URINARY TRACT INFECTION): Status: RESOLVED | Noted: 2024-11-28 | Resolved: 2025-03-26

## 2025-03-26 LAB
ABSOLUTE EOSINOPHIL (OHS): 0.09 K/UL
ABSOLUTE MONOCYTE (OHS): 0.96 K/UL (ref 0.3–1)
ABSOLUTE NEUTROPHIL COUNT (OHS): 4.96 K/UL (ref 1.8–7.7)
BASOPHILS # BLD AUTO: 0.04 K/UL
BASOPHILS NFR BLD AUTO: 0.5 %
C3 SERPL-MCNC: 159 MG/DL (ref 50–180)
C4 COMPLEMENT (OHS): 43 MG/DL (ref 11–44)
ERYTHROCYTE [DISTWIDTH] IN BLOOD BY AUTOMATED COUNT: 14.8 % (ref 11.5–14.5)
ERYTHROCYTE [SEDIMENTATION RATE] IN BLOOD BY PHOTOMETRIC METHOD: 25 MM/HR
HBV CORE AB SERPL QL IA: NORMAL
HBV SURFACE AG SERPL QL IA: NORMAL
HCT VFR BLD AUTO: 40.1 % (ref 37–48.5)
HGB BLD-MCNC: 12.5 GM/DL (ref 12–16)
IMM GRANULOCYTES # BLD AUTO: 0.02 K/UL (ref 0–0.04)
IMM GRANULOCYTES NFR BLD AUTO: 0.2 % (ref 0–0.5)
LYMPHOCYTES # BLD AUTO: 1.94 K/UL (ref 1–4.8)
MCH RBC QN AUTO: 28.7 PG (ref 27–50)
MCHC RBC AUTO-ENTMCNC: 31.2 G/DL (ref 32–36)
MCV RBC AUTO: 92 FL (ref 82–98)
NUCLEATED RBC (/100WBC) (OHS): 0 /100 WBC
PLATELET # BLD AUTO: 184 K/UL (ref 150–450)
PMV BLD AUTO: 11.3 FL (ref 9.2–12.9)
RBC # BLD AUTO: 4.36 M/UL (ref 4–5.4)
RELATIVE EOSINOPHIL (OHS): 1.1 %
RELATIVE LYMPHOCYTE (OHS): 24.2 % (ref 18–48)
RELATIVE MONOCYTE (OHS): 12 % (ref 4–15)
RELATIVE NEUTROPHIL (OHS): 62 % (ref 38–73)
WBC # BLD AUTO: 8.01 K/UL (ref 3.9–12.7)

## 2025-03-26 PROCEDURE — 87340 HEPATITIS B SURFACE AG IA: CPT

## 2025-03-26 PROCEDURE — 36415 COLL VENOUS BLD VENIPUNCTURE: CPT

## 2025-03-26 PROCEDURE — 84450 TRANSFERASE (AST) (SGOT): CPT

## 2025-03-26 PROCEDURE — 86704 HEP B CORE ANTIBODY TOTAL: CPT

## 2025-03-26 PROCEDURE — 86160 COMPLEMENT ANTIGEN: CPT

## 2025-03-26 PROCEDURE — 82784 ASSAY IGA/IGD/IGG/IGM EACH: CPT

## 2025-03-26 PROCEDURE — 86140 C-REACTIVE PROTEIN: CPT

## 2025-03-26 PROCEDURE — 86225 DNA ANTIBODY NATIVE: CPT

## 2025-03-26 PROCEDURE — 85025 COMPLETE CBC W/AUTO DIFF WBC: CPT

## 2025-03-26 PROCEDURE — 85652 RBC SED RATE AUTOMATED: CPT

## 2025-03-26 PROCEDURE — 83516 IMMUNOASSAY NONANTIBODY: CPT

## 2025-03-26 PROCEDURE — 88185 FLOWCYTOMETRY/TC ADD-ON: CPT

## 2025-03-26 PROCEDURE — 99999 PR PBB SHADOW E&M-EST. PATIENT-LVL III: CPT | Mod: PBBFAC,,, | Performed by: INTERNAL MEDICINE

## 2025-03-26 RX ORDER — MEPERIDINE HYDROCHLORIDE 50 MG/ML
25 INJECTION INTRAMUSCULAR; INTRAVENOUS; SUBCUTANEOUS
OUTPATIENT
Start: 2025-05-15

## 2025-03-26 RX ORDER — ACETAMINOPHEN 325 MG/1
650 TABLET ORAL
OUTPATIENT
Start: 2025-05-15

## 2025-03-26 RX ORDER — FAMOTIDINE 10 MG/ML
20 INJECTION, SOLUTION INTRAVENOUS
OUTPATIENT
Start: 2025-05-15

## 2025-03-26 RX ORDER — METHYLPREDNISOLONE SOD SUCC 125 MG
100 VIAL (EA) INJECTION
OUTPATIENT
Start: 2025-05-15

## 2025-03-26 RX ORDER — EPINEPHRINE 0.3 MG/.3ML
0.3 INJECTION SUBCUTANEOUS ONCE AS NEEDED
OUTPATIENT
Start: 2025-05-15

## 2025-03-26 RX ORDER — DIPHENHYDRAMINE HYDROCHLORIDE 50 MG/ML
50 INJECTION, SOLUTION INTRAMUSCULAR; INTRAVENOUS ONCE AS NEEDED
OUTPATIENT
Start: 2025-05-15

## 2025-03-26 RX ORDER — HEPARIN 100 UNIT/ML
500 SYRINGE INTRAVENOUS
OUTPATIENT
Start: 2025-05-15

## 2025-03-26 RX ORDER — SODIUM CHLORIDE 0.9 % (FLUSH) 0.9 %
10 SYRINGE (ML) INJECTION
OUTPATIENT
Start: 2025-05-15

## 2025-03-26 ASSESSMENT — ROUTINE ASSESSMENT OF PATIENT INDEX DATA (RAPID3)
PSYCHOLOGICAL DISTRESS SCORE: 0
PATIENT GLOBAL ASSESSMENT SCORE: 4
AM STIFFNESS SCORE: 1, YES
PAIN SCORE: 8
FATIGUE SCORE: 5.5
TOTAL RAPID3 SCORE: 4.44
MDHAQ FUNCTION SCORE: 0.4

## 2025-03-26 NOTE — PROGRESS NOTES
Subjective:       Patient ID: Kristin Goodman is a 79 y.o. female.    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   3)  CHANGES SUGGESTIVE OF FOCAL ACUTE PYELONEPHRITIS.   4)  MILD-TO-MODERATE ARTERIONEPHROSCLEROSIS      Oct 2022 Renal bx showed changes of both SLE and MPO-ANCA vasculitis     Nov 2024 CT Abd/pelvis possible rectal mass  Jan 2025 u/s retroperitoneal -  nl kidneys  Got rituximab Nov, 2024    Currently on prednisone 2.5 mg/d   Still hemodialysis twice / week [place], Tuesday and Saturday     LV 11/11/24    Feels joint pain when weather changes  A lot of pain in neck, legs, arms; may have swelling  Takes Tylenol and uses warm pad   No rashes   Weight stable  Sleep varies    Uses cane due to dizziness  One fall in house when went to fast    Review of Systems   Constitutional:  Positive for fatigue. Negative for fever and unexpected weight change.   HENT:  Negative for mouth sores and trouble swallowing.         No Dry mouth   Eyes:  Positive for redness.        No Dry eyes   Respiratory:  Negative for cough and shortness of breath.    Cardiovascular:  Negative for chest pain.   Gastrointestinal:  Negative for abdominal pain, constipation and diarrhea.   Genitourinary:  Negative for dysuria and genital sores.  "  Skin:  Negative for rash.   Neurological:  Negative for headaches.   Hematological:  Negative for adenopathy. Does not bruise/bleed easily.   Psychiatric/Behavioral:  Negative for sleep disturbance.          Objective:   BP (!) 148/77 (BP Location: Right arm, Patient Position: Sitting)   Pulse 70   Ht 5' 1" (1.549 m)   Wt 67 kg (147 lb 11.3 oz)   BMI 27.91 kg/m²      Physical Exam   Cardiovascular: Normal rate and regular rhythm.   Musculoskeletal:         General: No swelling or tenderness.   Lymphadenopathy:     She has no cervical adenopathy.   Skin: No lesion and no rash noted.         Assessment:       1. Antineutrophilic cytoplasmic antibody (ANCA) vasculitis    2. High risk medication use            Plan:       Problem List Items Addressed This Visit          Active Problems    Antineutrophilic cytoplasmic antibody (ANCA) vasculitis - Primary    Stable with dialysis and q6mo rituximab infusions with no symptoms of active vasculitis or SLE except for musculoskeletal pain of uncertain etiology    Will check lab today if stable will plan for additional 500 mg rituximab infusion in May    Follow up with me in August with lab again         Relevant Orders    C3 Complement    C4 Complement     Other Visit Diagnoses         High risk medication use        Relevant Orders    Rituxan Sensitivity    Immunoglobulins (IgG, IgA, IgM) Quantitative                "

## 2025-03-26 NOTE — ASSESSMENT & PLAN NOTE
Stable with dialysis and q6mo rituximab infusions with no symptoms of active vasculitis or SLE except for musculoskeletal pain of uncertain etiology    Will check lab today if stable will plan for additional 500 mg rituximab infusion in May    Follow up with me in August with lab again

## 2025-03-27 LAB
ALBUMIN SERPL BCP-MCNC: 3.7 G/DL (ref 3.5–5.2)
ALP SERPL-CCNC: 76 UNIT/L (ref 40–150)
ALT SERPL W/O P-5'-P-CCNC: 12 UNIT/L (ref 10–44)
ANION GAP (OHS): 12 MMOL/L (ref 8–16)
AST SERPL-CCNC: 23 UNIT/L (ref 11–45)
BILIRUB SERPL-MCNC: 0.3 MG/DL (ref 0.1–1)
BUN SERPL-MCNC: 38 MG/DL (ref 8–23)
CALCIUM SERPL-MCNC: 9.7 MG/DL (ref 8.7–10.5)
CHLORIDE SERPL-SCNC: 100 MMOL/L (ref 95–110)
CO2 SERPL-SCNC: 28 MMOL/L (ref 23–29)
CREAT SERPL-MCNC: 3.4 MG/DL (ref 0.5–1.4)
CRP SERPL-MCNC: 2.4 MG/L
DSDNA ANTIBODY (OHS): NORMAL
DSDNA ANTIBODY TITER (OHS): NORMAL
GFR SERPLBLD CREATININE-BSD FMLA CKD-EPI: 13 ML/MIN/1.73/M2
GLUCOSE SERPL-MCNC: 100 MG/DL (ref 70–110)
IGA SERPL-MCNC: 148 MG/DL (ref 40–350)
IGG SERPL-MCNC: 994 MG/DL (ref 650–1600)
IGM SERPL-MCNC: 70 MG/DL (ref 50–300)
POTASSIUM SERPL-SCNC: 4.6 MMOL/L (ref 3.5–5.1)
PROT SERPL-MCNC: 7.4 GM/DL (ref 6–8.4)
SODIUM SERPL-SCNC: 140 MMOL/L (ref 136–145)

## 2025-03-28 LAB
M CEE20 RESULT: NORMAL
PATH REPORT.FINAL DX SPEC: NORMAL

## 2025-03-29 ENCOUNTER — RESULTS FOLLOW-UP (OUTPATIENT)
Dept: RHEUMATOLOGY | Facility: HOSPITAL | Age: 80
End: 2025-03-29

## 2025-04-01 DIAGNOSIS — Z86.718 CURRENT LONG-TERM USE OF ANTICOAGULANT MEDICATION WITH HISTORY OF DEEP VENOUS THROMBOSIS (DVT): ICD-10-CM

## 2025-04-01 DIAGNOSIS — Z79.01 CURRENT LONG-TERM USE OF ANTICOAGULANT MEDICATION WITH HISTORY OF DEEP VENOUS THROMBOSIS (DVT): ICD-10-CM

## 2025-04-20 DIAGNOSIS — I50.32 CHRONIC DIASTOLIC HEART FAILURE: Primary | ICD-10-CM

## 2025-04-20 DIAGNOSIS — M79.10 MYALGIA: ICD-10-CM

## 2025-04-20 DIAGNOSIS — D84.9 IMMUNOSUPPRESSION: ICD-10-CM

## 2025-04-21 RX ORDER — CYCLOBENZAPRINE HCL 5 MG
5 TABLET ORAL NIGHTLY PRN
Qty: 20 TABLET | Refills: 2 | Status: SHIPPED | OUTPATIENT
Start: 2025-04-21

## 2025-04-22 NOTE — SUBJECTIVE & OBJECTIVE
Interval History: No acute events. No further chest/back pain, trop neg x2. Ambulating without lightheadedness. Ortho statics +, adjusting BP regimen, reports getting lightheaded after HD.     Gets HD twice weekly, nephrology consulted for HD.     UA+, urine culture in process. History of Ecoli ESBL started on erta.    Review of Systems   Constitutional:  Negative for fever.   Respiratory:  Negative for shortness of breath.    Cardiovascular:  Negative for chest pain.   Genitourinary:  Negative for dysuria.   Musculoskeletal:  Negative for back pain.   Neurological:  Negative for light-headedness.     Objective:     Vital Signs (Most Recent):  Temp: 98.5 °F (36.9 °C) (08/07/23 1332)  Pulse: 78 (08/07/23 1332)  Resp: 20 (08/07/23 1332)  BP: 139/70 (08/07/23 1332)  SpO2: 100 % (08/07/23 1332) Vital Signs (24h Range):  Temp:  [98.2 °F (36.8 °C)-99.3 °F (37.4 °C)] 98.5 °F (36.9 °C)  Pulse:  [69-78] 78  Resp:  [14-20] 20  SpO2:  [98 %-100 %] 100 %  BP: (108-154)/(55-70) 139/70     Weight: 56.2 kg (124 lb)  Body mass index is 23.43 kg/m².    Intake/Output Summary (Last 24 hours) at 8/7/2023 1614  Last data filed at 8/6/2023 2105  Gross per 24 hour   Intake 100 ml   Output --   Net 100 ml         Physical Exam  Vitals and nursing note reviewed.   Constitutional:       General: She is not in acute distress.     Appearance: Normal appearance. She is not toxic-appearing.   HENT:      Head: Normocephalic and atraumatic.      Nose: Nose normal.      Mouth/Throat:      Mouth: Mucous membranes are moist.      Pharynx: Oropharynx is clear.   Eyes:      Extraocular Movements: Extraocular movements intact.      Conjunctiva/sclera: Conjunctivae normal.      Pupils: Pupils are equal, round, and reactive to light.   Cardiovascular:      Rate and Rhythm: Normal rate and regular rhythm.      Pulses: Normal pulses.      Heart sounds: Normal heart sounds.      Comments: Left chest wall HD cath without erythema or tenderness  Pulmonary:       Effort: Pulmonary effort is normal.      Breath sounds: Normal breath sounds.   Chest:      Chest wall: No tenderness.   Abdominal:      General: Abdomen is flat. Bowel sounds are normal.      Palpations: Abdomen is soft.      Tenderness: There is no abdominal tenderness.   Musculoskeletal:         General: No swelling. Normal range of motion.      Cervical back: Normal range of motion and neck supple.   Skin:     General: Skin is warm and dry.   Neurological:      General: No focal deficit present.      Mental Status: She is alert and oriented to person, place, and time.   Psychiatric:         Mood and Affect: Mood normal.         Behavior: Behavior normal.             Significant Labs: All pertinent labs within the past 24 hours have been reviewed.    Significant Imaging: I have reviewed all pertinent imaging results/findings within the past 24 hours.   no

## 2025-04-28 ENCOUNTER — PATIENT MESSAGE (OUTPATIENT)
Facility: CLINIC | Age: 80
End: 2025-04-28
Payer: MEDICARE

## 2025-04-28 DIAGNOSIS — D84.9 IMMUNOSUPPRESSION: ICD-10-CM

## 2025-04-28 RX ORDER — ATOVAQUONE 750 MG/5ML
1500 SUSPENSION ORAL DAILY
Qty: 300 ML | Refills: 3 | Status: SHIPPED | OUTPATIENT
Start: 2025-04-28

## 2025-04-28 RX ORDER — CARVEDILOL 12.5 MG/1
12.5 TABLET ORAL 2 TIMES DAILY
Qty: 60 TABLET | Refills: 11 | Status: SHIPPED | OUTPATIENT
Start: 2025-04-28 | End: 2026-04-28

## 2025-04-28 RX ORDER — QUETIAPINE FUMARATE 25 MG/1
25 TABLET, FILM COATED ORAL NIGHTLY
Qty: 30 TABLET | Refills: 11 | Status: SHIPPED | OUTPATIENT
Start: 2025-04-28 | End: 2026-04-28

## 2025-04-28 RX ORDER — TORSEMIDE 100 MG/1
100 TABLET ORAL DAILY
Qty: 30 TABLET | Refills: 0 | Status: SHIPPED | OUTPATIENT
Start: 2025-04-28

## 2025-04-28 RX ORDER — ATOVAQUONE 750 MG/5ML
1500 SUSPENSION ORAL DAILY
Qty: 300 ML | Refills: 3 | Status: SHIPPED | OUTPATIENT
Start: 2025-04-28 | End: 2025-04-28 | Stop reason: SDUPTHER

## 2025-05-12 ENCOUNTER — INFUSION (OUTPATIENT)
Dept: INFUSION THERAPY | Facility: HOSPITAL | Age: 80
End: 2025-05-12
Payer: MEDICARE

## 2025-05-12 VITALS
DIASTOLIC BLOOD PRESSURE: 59 MMHG | HEART RATE: 78 BPM | SYSTOLIC BLOOD PRESSURE: 128 MMHG | RESPIRATION RATE: 18 BRPM | BODY MASS INDEX: 28.51 KG/M2 | WEIGHT: 151 LBS | OXYGEN SATURATION: 97 % | TEMPERATURE: 98 F | HEIGHT: 61 IN

## 2025-05-12 DIAGNOSIS — I77.82 ANTINEUTROPHILIC CYTOPLASMIC ANTIBODY (ANCA) VASCULITIS: Primary | ICD-10-CM

## 2025-05-12 PROCEDURE — 25000003 PHARM REV CODE 250: Performed by: INTERNAL MEDICINE

## 2025-05-12 PROCEDURE — 96375 TX/PRO/DX INJ NEW DRUG ADDON: CPT

## 2025-05-12 PROCEDURE — 96413 CHEMO IV INFUSION 1 HR: CPT

## 2025-05-12 PROCEDURE — 96367 TX/PROPH/DG ADDL SEQ IV INF: CPT

## 2025-05-12 PROCEDURE — 96415 CHEMO IV INFUSION ADDL HR: CPT

## 2025-05-12 PROCEDURE — 63600175 PHARM REV CODE 636 W HCPCS: Performed by: INTERNAL MEDICINE

## 2025-05-12 RX ORDER — HEPARIN 100 UNIT/ML
500 SYRINGE INTRAVENOUS
OUTPATIENT
Start: 2025-09-15

## 2025-05-12 RX ORDER — DIPHENHYDRAMINE HYDROCHLORIDE 50 MG/ML
50 INJECTION, SOLUTION INTRAMUSCULAR; INTRAVENOUS ONCE AS NEEDED
Status: DISCONTINUED | OUTPATIENT
Start: 2025-05-12 | End: 2025-05-12 | Stop reason: HOSPADM

## 2025-05-12 RX ORDER — MEPERIDINE HYDROCHLORIDE 50 MG/ML
25 INJECTION INTRAMUSCULAR; INTRAVENOUS; SUBCUTANEOUS
Status: DISCONTINUED | OUTPATIENT
Start: 2025-05-12 | End: 2025-05-12 | Stop reason: HOSPADM

## 2025-05-12 RX ORDER — MEPERIDINE HYDROCHLORIDE 50 MG/ML
25 INJECTION INTRAMUSCULAR; INTRAVENOUS; SUBCUTANEOUS
OUTPATIENT
Start: 2025-09-15

## 2025-05-12 RX ORDER — SODIUM CHLORIDE 0.9 % (FLUSH) 0.9 %
10 SYRINGE (ML) INJECTION
OUTPATIENT
Start: 2025-09-15

## 2025-05-12 RX ORDER — ACETAMINOPHEN 325 MG/1
650 TABLET ORAL
OUTPATIENT
Start: 2025-09-15

## 2025-05-12 RX ORDER — FAMOTIDINE 10 MG/ML
20 INJECTION, SOLUTION INTRAVENOUS
Status: COMPLETED | OUTPATIENT
Start: 2025-05-12 | End: 2025-05-12

## 2025-05-12 RX ORDER — FAMOTIDINE 10 MG/ML
20 INJECTION, SOLUTION INTRAVENOUS
OUTPATIENT
Start: 2025-09-15

## 2025-05-12 RX ORDER — METHYLPREDNISOLONE SOD SUCC 125 MG
100 VIAL (EA) INJECTION
Status: COMPLETED | OUTPATIENT
Start: 2025-05-12 | End: 2025-05-12

## 2025-05-12 RX ORDER — EPINEPHRINE 0.3 MG/.3ML
0.3 INJECTION SUBCUTANEOUS ONCE AS NEEDED
Status: DISCONTINUED | OUTPATIENT
Start: 2025-05-12 | End: 2025-05-12 | Stop reason: HOSPADM

## 2025-05-12 RX ORDER — ACETAMINOPHEN 325 MG/1
650 TABLET ORAL
Status: COMPLETED | OUTPATIENT
Start: 2025-05-12 | End: 2025-05-12

## 2025-05-12 RX ORDER — METHYLPREDNISOLONE SOD SUCC 125 MG
100 VIAL (EA) INJECTION
OUTPATIENT
Start: 2025-09-15

## 2025-05-12 RX ORDER — EPINEPHRINE 0.3 MG/.3ML
0.3 INJECTION SUBCUTANEOUS ONCE AS NEEDED
OUTPATIENT
Start: 2025-09-15

## 2025-05-12 RX ORDER — DIPHENHYDRAMINE HYDROCHLORIDE 50 MG/ML
50 INJECTION, SOLUTION INTRAMUSCULAR; INTRAVENOUS ONCE AS NEEDED
OUTPATIENT
Start: 2025-09-15

## 2025-05-12 RX ADMIN — METHYLPREDNISOLONE SODIUM SUCCINATE 100 MG: 125 INJECTION, POWDER, FOR SOLUTION INTRAMUSCULAR; INTRAVENOUS at 08:05

## 2025-05-12 RX ADMIN — FAMOTIDINE 20 MG: 10 INJECTION INTRAVENOUS at 08:05

## 2025-05-12 RX ADMIN — DIPHENHYDRAMINE HYDROCHLORIDE 50 MG: 50 INJECTION INTRAMUSCULAR; INTRAVENOUS at 07:05

## 2025-05-12 RX ADMIN — SODIUM CHLORIDE 500 MG: 9 INJECTION, SOLUTION INTRAVENOUS at 08:05

## 2025-05-12 RX ADMIN — ACETAMINOPHEN 650 MG: 325 TABLET ORAL at 07:05

## 2025-05-12 NOTE — PLAN OF CARE
"BP (!) 128/59 (Patient Position: Sitting)   Pulse 78   Temp 98.2 °F (36.8 °C)   Resp 18   Ht 5' 1" (1.549 m)   Wt 68.5 kg (151 lb 0.2 oz)   SpO2 97%   BMI 28.53 kg/m² Pleasant, alert and oriented patient to Chemo Infusion per self using a cane and escorted per daughter for maintenance Ruxience every 6 months - VSS and PIV started x1 attempt with immediate flashback observed, flushed with NS, dressing applied, site secured and patient tolerated procedure well - patient tolerated treatment with no AVE's, PIV discontinued, pressure dressing applied and patient discharged to home with no concerns - RTC TBA    "

## 2025-05-19 ENCOUNTER — OFFICE VISIT (OUTPATIENT)
Facility: CLINIC | Age: 80
End: 2025-05-19
Payer: MEDICARE

## 2025-05-19 VITALS
SYSTOLIC BLOOD PRESSURE: 144 MMHG | TEMPERATURE: 98 F | WEIGHT: 148.69 LBS | DIASTOLIC BLOOD PRESSURE: 70 MMHG | HEIGHT: 61 IN | BODY MASS INDEX: 28.07 KG/M2 | RESPIRATION RATE: 18 BRPM

## 2025-05-19 DIAGNOSIS — I10 PRIMARY HYPERTENSION: Primary | ICD-10-CM

## 2025-05-19 DIAGNOSIS — I50.32 CHRONIC DIASTOLIC HEART FAILURE: ICD-10-CM

## 2025-05-19 DIAGNOSIS — N18.6 ESRD (END STAGE RENAL DISEASE): ICD-10-CM

## 2025-05-19 PROCEDURE — 1101F PT FALLS ASSESS-DOCD LE1/YR: CPT | Mod: CPTII,S$GLB,, | Performed by: STUDENT IN AN ORGANIZED HEALTH CARE EDUCATION/TRAINING PROGRAM

## 2025-05-19 PROCEDURE — 1159F MED LIST DOCD IN RCRD: CPT | Mod: CPTII,S$GLB,, | Performed by: STUDENT IN AN ORGANIZED HEALTH CARE EDUCATION/TRAINING PROGRAM

## 2025-05-19 PROCEDURE — G2211 COMPLEX E/M VISIT ADD ON: HCPCS | Mod: S$GLB,,, | Performed by: STUDENT IN AN ORGANIZED HEALTH CARE EDUCATION/TRAINING PROGRAM

## 2025-05-19 PROCEDURE — 3077F SYST BP >= 140 MM HG: CPT | Mod: CPTII,S$GLB,, | Performed by: STUDENT IN AN ORGANIZED HEALTH CARE EDUCATION/TRAINING PROGRAM

## 2025-05-19 PROCEDURE — 3078F DIAST BP <80 MM HG: CPT | Mod: CPTII,S$GLB,, | Performed by: STUDENT IN AN ORGANIZED HEALTH CARE EDUCATION/TRAINING PROGRAM

## 2025-05-19 PROCEDURE — 3288F FALL RISK ASSESSMENT DOCD: CPT | Mod: CPTII,S$GLB,, | Performed by: STUDENT IN AN ORGANIZED HEALTH CARE EDUCATION/TRAINING PROGRAM

## 2025-05-19 PROCEDURE — 99215 OFFICE O/P EST HI 40 MIN: CPT | Mod: S$GLB,,, | Performed by: STUDENT IN AN ORGANIZED HEALTH CARE EDUCATION/TRAINING PROGRAM

## 2025-05-19 PROCEDURE — 1126F AMNT PAIN NOTED NONE PRSNT: CPT | Mod: CPTII,S$GLB,, | Performed by: STUDENT IN AN ORGANIZED HEALTH CARE EDUCATION/TRAINING PROGRAM

## 2025-05-19 PROCEDURE — 1157F ADVNC CARE PLAN IN RCRD: CPT | Mod: CPTII,S$GLB,, | Performed by: STUDENT IN AN ORGANIZED HEALTH CARE EDUCATION/TRAINING PROGRAM

## 2025-05-19 PROCEDURE — 1160F RVW MEDS BY RX/DR IN RCRD: CPT | Mod: CPTII,S$GLB,, | Performed by: STUDENT IN AN ORGANIZED HEALTH CARE EDUCATION/TRAINING PROGRAM

## 2025-05-19 PROCEDURE — 99999 PR PBB SHADOW E&M-EST. PATIENT-LVL V: CPT | Mod: PBBFAC,,, | Performed by: STUDENT IN AN ORGANIZED HEALTH CARE EDUCATION/TRAINING PROGRAM

## 2025-05-19 RX ORDER — TORSEMIDE 20 MG/1
20 TABLET ORAL DAILY
Qty: 90 TABLET | Refills: 3 | Status: SHIPPED | OUTPATIENT
Start: 2025-05-19 | End: 2026-05-19

## 2025-05-19 RX ORDER — PANTOPRAZOLE SODIUM 40 MG/1
40 TABLET, DELAYED RELEASE ORAL DAILY PRN
Qty: 90 TABLET | Refills: 1 | Status: SHIPPED | OUTPATIENT
Start: 2025-05-19 | End: 2026-05-19

## 2025-05-19 NOTE — PATIENT INSTRUCTIONS
Thank you so much for coming to see me today for a visit.  Please call with any questions or concerns. Have a good day.         Hiccups:  Try pantoprazole        BLOOD PRESSURE      ---   1)  Take amlodipine 5mg and take blood pressure.         - If  blood pressure is greater than  145 /-- , we can add this back.         Option 2:     --- Try taking torsemide 20mg in the morning and 40-60 mg in the afternoon around 2 pm.     ---  Keep pressure BP  log
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
## 2025-06-02 ENCOUNTER — OFFICE VISIT (OUTPATIENT)
Facility: CLINIC | Age: 80
End: 2025-06-02
Payer: MEDICARE

## 2025-06-02 VITALS — DIASTOLIC BLOOD PRESSURE: 73 MMHG | SYSTOLIC BLOOD PRESSURE: 122 MMHG

## 2025-06-02 DIAGNOSIS — I50.32 CHRONIC DIASTOLIC HEART FAILURE: ICD-10-CM

## 2025-06-02 DIAGNOSIS — N18.6 ESRD (END STAGE RENAL DISEASE): ICD-10-CM

## 2025-06-02 DIAGNOSIS — I10 PRIMARY HYPERTENSION: Primary | ICD-10-CM

## 2025-06-02 PROCEDURE — 1157F ADVNC CARE PLAN IN RCRD: CPT | Mod: CPTII,NDTC,, | Performed by: STUDENT IN AN ORGANIZED HEALTH CARE EDUCATION/TRAINING PROGRAM

## 2025-06-02 PROCEDURE — 3074F SYST BP LT 130 MM HG: CPT | Mod: CPTII,NDTC,, | Performed by: STUDENT IN AN ORGANIZED HEALTH CARE EDUCATION/TRAINING PROGRAM

## 2025-06-02 PROCEDURE — 98016 BRIEF COMUNICAJ TECH-BSD SVC: CPT | Mod: NDTC,,, | Performed by: STUDENT IN AN ORGANIZED HEALTH CARE EDUCATION/TRAINING PROGRAM

## 2025-06-02 PROCEDURE — 3078F DIAST BP <80 MM HG: CPT | Mod: CPTII,NDTC,, | Performed by: STUDENT IN AN ORGANIZED HEALTH CARE EDUCATION/TRAINING PROGRAM

## 2025-06-03 ENCOUNTER — PATIENT MESSAGE (OUTPATIENT)
Facility: CLINIC | Age: 80
End: 2025-06-03
Payer: MEDICARE

## 2025-06-12 DIAGNOSIS — Z86.718 CURRENT LONG-TERM USE OF ANTICOAGULANT MEDICATION WITH HISTORY OF DEEP VENOUS THROMBOSIS (DVT): ICD-10-CM

## 2025-06-12 DIAGNOSIS — D84.9 IMMUNOSUPPRESSION: ICD-10-CM

## 2025-06-12 DIAGNOSIS — Z79.01 CURRENT LONG-TERM USE OF ANTICOAGULANT MEDICATION WITH HISTORY OF DEEP VENOUS THROMBOSIS (DVT): ICD-10-CM

## 2025-06-16 ENCOUNTER — OFFICE VISIT (OUTPATIENT)
Dept: CARDIOLOGY | Facility: CLINIC | Age: 80
End: 2025-06-16
Payer: MEDICARE

## 2025-06-16 VITALS
OXYGEN SATURATION: 96 % | RESPIRATION RATE: 15 BRPM | SYSTOLIC BLOOD PRESSURE: 154 MMHG | BODY MASS INDEX: 28.22 KG/M2 | HEART RATE: 63 BPM | HEIGHT: 61 IN | WEIGHT: 149.5 LBS | DIASTOLIC BLOOD PRESSURE: 79 MMHG

## 2025-06-16 DIAGNOSIS — I34.0 NONRHEUMATIC MITRAL VALVE REGURGITATION: ICD-10-CM

## 2025-06-16 DIAGNOSIS — I10 PRIMARY HYPERTENSION: ICD-10-CM

## 2025-06-16 DIAGNOSIS — I50.32 CHRONIC DIASTOLIC HEART FAILURE: ICD-10-CM

## 2025-06-16 DIAGNOSIS — N18.6 ESRD (END STAGE RENAL DISEASE): ICD-10-CM

## 2025-06-16 DIAGNOSIS — I48.91 ATRIAL FIBRILLATION, UNSPECIFIED TYPE: Primary | ICD-10-CM

## 2025-06-16 PROCEDURE — 99999 PR PBB SHADOW E&M-EST. PATIENT-LVL V: CPT | Mod: PBBFAC,,, | Performed by: INTERNAL MEDICINE

## 2025-06-16 PROCEDURE — 99214 OFFICE O/P EST MOD 30 MIN: CPT | Mod: S$GLB,,, | Performed by: INTERNAL MEDICINE

## 2025-06-16 PROCEDURE — G2211 COMPLEX E/M VISIT ADD ON: HCPCS | Mod: S$GLB,,, | Performed by: INTERNAL MEDICINE

## 2025-06-16 PROCEDURE — 3078F DIAST BP <80 MM HG: CPT | Mod: CPTII,S$GLB,, | Performed by: INTERNAL MEDICINE

## 2025-06-16 PROCEDURE — 93000 ELECTROCARDIOGRAM COMPLETE: CPT | Mod: S$GLB,,, | Performed by: INTERNAL MEDICINE

## 2025-06-16 PROCEDURE — 1157F ADVNC CARE PLAN IN RCRD: CPT | Mod: CPTII,S$GLB,, | Performed by: INTERNAL MEDICINE

## 2025-06-16 PROCEDURE — 1159F MED LIST DOCD IN RCRD: CPT | Mod: CPTII,S$GLB,, | Performed by: INTERNAL MEDICINE

## 2025-06-16 PROCEDURE — 1101F PT FALLS ASSESS-DOCD LE1/YR: CPT | Mod: CPTII,S$GLB,, | Performed by: INTERNAL MEDICINE

## 2025-06-16 PROCEDURE — 3288F FALL RISK ASSESSMENT DOCD: CPT | Mod: CPTII,S$GLB,, | Performed by: INTERNAL MEDICINE

## 2025-06-16 PROCEDURE — 3077F SYST BP >= 140 MM HG: CPT | Mod: CPTII,S$GLB,, | Performed by: INTERNAL MEDICINE

## 2025-06-16 PROCEDURE — 1126F AMNT PAIN NOTED NONE PRSNT: CPT | Mod: CPTII,S$GLB,, | Performed by: INTERNAL MEDICINE

## 2025-06-16 RX ORDER — HYDRALAZINE HYDROCHLORIDE 25 MG/1
25 TABLET, FILM COATED ORAL 3 TIMES DAILY
Qty: 90 TABLET | Refills: 3 | Status: SHIPPED | OUTPATIENT
Start: 2025-06-16

## 2025-06-16 NOTE — PROGRESS NOTES
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 2.137 0.632       The 10-year ASCVD risk score (Martha SOLO, et al., 2019) is: 16.7%    Values used to calculate the score:      Age: 80 years      Sex: Female      Is Non- : Yes      Diabetic: No      Tobacco smoker: No      Systolic Blood Pressure: 154 mmHg      Is BP treated: Yes      HDL Cholesterol: 44 mg/dL      Total Cholesterol: 168 mg/dL           Cardiovascular Testing:    EKG: (personally reviewed tracing)  Normal sinus rhythm    Renal ultrasound in May of 2019:    This was a technically difficult study. This study was limited due to excessive bowel gas.  There is insignificant stenosis (0-59%) in the Right Renal Artery.  Elevated right renal resistive index suggestive of intrinsic kidney disease.  Right kidney 9.00 cm.  There is insignificant stenosis (0-59%) in the Left Renal Artery.  Elevated left renal resistive index suggestive of intrinsic kidney disease.  Left kidney 9.50 cm.    Stress test in May 2019:    Probably normal Ramya MPI  The perfusion scan is free of evidence from myocardial ischemia or injury.  There is a mild intensity defect in the anterior wall of the left ventricle, secondary to breast attenuation.  LV cavity size is normal at rest.  Visually estimated LV function is normal.  Resting wall motion is physiologic.      2D  echocardiogram in February of 2019:  Normal left ventricular systolic function. The estimated ejection fraction is 55%  Concentric left ventricular remodeling.  Normal LV diastolic function.  Mild-to-moderate mitral regurgitation.        Stress echo in October of 2017:    CONCLUSIONS     1 - Normal left ventricular systolic function (EF 55-60%).     2 - Concentric remodeling.     3 - Impaired LV relaxation, elevated LAP (grade 2 diastolic dysfunction).     4 - Moderate mitral regurgitation.     5 - Mild tricuspid regurgitation.     6 - Trivial pulmonic regurgitation.     7 - The estimated PA systolic pressure is 40 mmHg.     No evidence of stress induced myocardial ischemia.       ASSESSMENT AND PLAN     Patient Active Problem List   Diagnosis    Hypothyroid    Primary hypertension    Mitral valve regurgitation    Chest pain    Syncope    CAREY (dyspnea on exertion)    Chronic diastolic heart failure    Sleep arousal disorder    Acute medial meniscal tear, right, initial encounter    Impaired mobility    S/P meniscectomy    Other nonthrombocytopenic purpura    Atherosclerosis of renal artery    Other specified anemias    Antineutrophilic cytoplasmic antibody (ANCA) vasculitis    Moderate malnutrition    Dysphagia    Hematuria syndrome    Dependence on hemodialysis    Anemia due to chronic kidney disease    Dizzy spells    Acute deep vein thrombosis (DVT) of non-extremity vein - subclavian    Secondary hyperparathyroidism of renal origin    Lightheadedness    Current long-term use of anticoagulant medication with history of deep venous thrombosis (DVT)    ESRD (end stage renal disease)    UTI symptoms    History of urinary tract infection    Dyslipidemia    Atrial fibrillation, unspecified type     No orders of the defined types were placed in this encounter.    Assessment & Plan    IMPRESSION:  Blood pressure control fluctuates with higher readings in evenings.  Current dialysis regimen reduced from thrice to twice  weekly sessions.    PLAN SUMMARY:  - Continue Carvedilol  - Continue Torsemide  - Continue Eliquis  - Start Hydralazine 25 mg 3 times daily, skip if BP < 100 mmHg  - Follow up in 3 months to assess new medication regimen  - Once stable regimen established, follow up every 6 months    Hypertension:  - Kristin to continue monitoring blood pressure at home, particularly noting morning and evening readings.  - Started Hydralazine 25 mg 3 times daily, skip dose if blood pressure is below 100 mmHg, to address blood pressure variability, particularly elevated evening readings.  - Continued Carvedilol.    History of AFib and DVT with  LONG TERM USE OF ANTICOAGULANTS:  - Continued Eliquis.  - currently normal sinus rhythm      Mitral valve regurgitation:  Mild-to-moderate on the last echocardiogram.       Dyslipidemia:  Lipitor was stopped because of elevated CPK.   Will continue diet and lifestyle modifications.    Screening ultrasound of the carotids as well as rest and stress ABIs for screening for peripheral artery disease. Normal CONNOR. No significant stenosis on carotid ultrasound     Stress test in 2024 did not show any significant ischemia.  Chest pains have resolved.    ESRD TUE AND SATURDAY    FOLLOW-UP:  - Follow up in 3 months to assess effectiveness of new medication regimen.  - Once stable regimen is established, follow up every 6 months.           Thank you very much for involving me in the care of your patient.  Please do not hesitate to contact me if there are any questions.      Cesar Mills MD, [facility], [facility]  Interventional Cardiologist, [facility].     This note was generated with the assistance of ambient listening technology. Verbal consent was obtained by the patient and accompanying visitor(s) for the recording of patient appointment to facilitate this note. I attest to having reviewed and edited the generated note for accuracy, though some syntax or spelling errors may persist. Please contact the  author of this note for any clarification.    Visit today included increased complexity associated with the care of the episodic problem hypertension, end-stage renal disease on dialysis addressed and managing the longitudinal care of the patient due to the serious and/or complex managed problem(s) Problem List[1]  .    This note was dictated with the help of speech recognition software.  There might be un-intended errors and/or substitutions.                     [1]   Patient Active Problem List  Diagnosis    Hypothyroid    Primary hypertension    Mitral valve regurgitation    Chest pain    Syncope    CAREY (dyspnea on exertion)    Chronic diastolic heart failure    Sleep arousal disorder    Acute medial meniscal tear, right, initial encounter    Impaired mobility    S/P meniscectomy    Other nonthrombocytopenic purpura    Atherosclerosis of renal artery    Other specified anemias    Antineutrophilic cytoplasmic antibody (ANCA) vasculitis    Moderate malnutrition    Dysphagia    Hematuria syndrome    Dependence on hemodialysis    Anemia due to chronic kidney disease    Dizzy spells    Acute deep vein thrombosis (DVT) of non-extremity vein - subclavian    Secondary hyperparathyroidism of renal origin    Lightheadedness    Current long-term use of anticoagulant medication with history of deep venous thrombosis (DVT)    ESRD (end stage renal disease)    UTI symptoms    History of urinary tract infection    Dyslipidemia    Atrial fibrillation, unspecified type

## 2025-06-18 LAB
OHS QRS DURATION: 74 MS
OHS QTC CALCULATION: 426 MS

## 2025-07-02 DIAGNOSIS — J30.9 ALLERGIC RHINITIS, UNSPECIFIED SEASONALITY, UNSPECIFIED TRIGGER: ICD-10-CM

## 2025-07-02 DIAGNOSIS — D84.9 IMMUNOSUPPRESSION: ICD-10-CM

## 2025-07-03 RX ORDER — LEVOCETIRIZINE DIHYDROCHLORIDE 5 MG/1
2.5 TABLET, FILM COATED ORAL NIGHTLY
Qty: 15 TABLET | Refills: 11 | Status: SHIPPED | OUTPATIENT
Start: 2025-07-03 | End: 2026-07-03

## 2025-07-03 RX ORDER — ATOVAQUONE 750 MG/5ML
1500 SUSPENSION ORAL DAILY
Qty: 300 ML | Refills: 3 | Status: SHIPPED | OUTPATIENT
Start: 2025-07-03

## 2025-07-03 RX ORDER — CARVEDILOL 12.5 MG/1
12.5 TABLET ORAL 2 TIMES DAILY
Qty: 60 TABLET | Refills: 11 | Status: SHIPPED | OUTPATIENT
Start: 2025-07-03 | End: 2026-07-03

## 2025-08-13 ENCOUNTER — OFFICE VISIT (OUTPATIENT)
Dept: UROLOGY | Facility: CLINIC | Age: 80
End: 2025-08-13
Payer: MEDICARE

## 2025-08-13 VITALS — WEIGHT: 153 LBS | BODY MASS INDEX: 28.91 KG/M2

## 2025-08-13 DIAGNOSIS — R39.9 UTI SYMPTOMS: ICD-10-CM

## 2025-08-13 DIAGNOSIS — I82.90 ACUTE DEEP VEIN THROMBOSIS (DVT) OF NON-EXTREMITY VEIN: ICD-10-CM

## 2025-08-13 DIAGNOSIS — R35.1 NOCTURIA: Primary | ICD-10-CM

## 2025-08-13 PROCEDURE — 1159F MED LIST DOCD IN RCRD: CPT | Mod: CPTII,S$GLB,, | Performed by: UROLOGY

## 2025-08-13 PROCEDURE — 1160F RVW MEDS BY RX/DR IN RCRD: CPT | Mod: CPTII,S$GLB,, | Performed by: UROLOGY

## 2025-08-13 PROCEDURE — 99213 OFFICE O/P EST LOW 20 MIN: CPT | Mod: S$GLB,,, | Performed by: UROLOGY

## 2025-08-13 PROCEDURE — 1157F ADVNC CARE PLAN IN RCRD: CPT | Mod: CPTII,S$GLB,, | Performed by: UROLOGY

## 2025-08-13 PROCEDURE — 99999 PR PBB SHADOW E&M-EST. PATIENT-LVL II: CPT | Mod: PBBFAC,,, | Performed by: UROLOGY

## 2025-08-13 RX ORDER — ESTRADIOL 0.1 MG/G
1 CREAM VAGINAL
Qty: 42.5 G | Refills: 3 | Status: SHIPPED | OUTPATIENT
Start: 2025-08-13 | End: 2026-08-13

## 2025-08-20 ENCOUNTER — OFFICE VISIT (OUTPATIENT)
Facility: CLINIC | Age: 80
End: 2025-08-20
Payer: MEDICARE

## 2025-08-20 VITALS
DIASTOLIC BLOOD PRESSURE: 80 MMHG | RESPIRATION RATE: 18 BRPM | SYSTOLIC BLOOD PRESSURE: 124 MMHG | TEMPERATURE: 98 F | BODY MASS INDEX: 29.02 KG/M2 | WEIGHT: 153.69 LBS | HEIGHT: 61 IN

## 2025-08-20 DIAGNOSIS — I82.90 ACUTE DEEP VEIN THROMBOSIS (DVT) OF NON-EXTREMITY VEIN: ICD-10-CM

## 2025-08-20 DIAGNOSIS — N18.6 ESRD (END STAGE RENAL DISEASE): Primary | ICD-10-CM

## 2025-08-20 DIAGNOSIS — Z87.440 HISTORY OF URINARY TRACT INFECTION: ICD-10-CM

## 2025-08-20 DIAGNOSIS — J30.9 ALLERGIC RHINITIS, UNSPECIFIED SEASONALITY, UNSPECIFIED TRIGGER: ICD-10-CM

## 2025-08-20 DIAGNOSIS — I10 PRIMARY HYPERTENSION: ICD-10-CM

## 2025-08-20 PROCEDURE — 3079F DIAST BP 80-89 MM HG: CPT | Mod: CPTII,S$GLB,, | Performed by: STUDENT IN AN ORGANIZED HEALTH CARE EDUCATION/TRAINING PROGRAM

## 2025-08-20 PROCEDURE — 3074F SYST BP LT 130 MM HG: CPT | Mod: CPTII,S$GLB,, | Performed by: STUDENT IN AN ORGANIZED HEALTH CARE EDUCATION/TRAINING PROGRAM

## 2025-08-20 PROCEDURE — 1126F AMNT PAIN NOTED NONE PRSNT: CPT | Mod: CPTII,S$GLB,, | Performed by: STUDENT IN AN ORGANIZED HEALTH CARE EDUCATION/TRAINING PROGRAM

## 2025-08-20 PROCEDURE — 99999 PR PBB SHADOW E&M-EST. PATIENT-LVL IV: CPT | Mod: PBBFAC,,, | Performed by: STUDENT IN AN ORGANIZED HEALTH CARE EDUCATION/TRAINING PROGRAM

## 2025-08-20 PROCEDURE — G2211 COMPLEX E/M VISIT ADD ON: HCPCS | Mod: S$GLB,,, | Performed by: STUDENT IN AN ORGANIZED HEALTH CARE EDUCATION/TRAINING PROGRAM

## 2025-08-20 PROCEDURE — 1160F RVW MEDS BY RX/DR IN RCRD: CPT | Mod: CPTII,S$GLB,, | Performed by: STUDENT IN AN ORGANIZED HEALTH CARE EDUCATION/TRAINING PROGRAM

## 2025-08-20 PROCEDURE — 3288F FALL RISK ASSESSMENT DOCD: CPT | Mod: CPTII,S$GLB,, | Performed by: STUDENT IN AN ORGANIZED HEALTH CARE EDUCATION/TRAINING PROGRAM

## 2025-08-20 PROCEDURE — 99215 OFFICE O/P EST HI 40 MIN: CPT | Mod: S$GLB,,, | Performed by: STUDENT IN AN ORGANIZED HEALTH CARE EDUCATION/TRAINING PROGRAM

## 2025-08-20 PROCEDURE — 1159F MED LIST DOCD IN RCRD: CPT | Mod: CPTII,S$GLB,, | Performed by: STUDENT IN AN ORGANIZED HEALTH CARE EDUCATION/TRAINING PROGRAM

## 2025-08-20 PROCEDURE — 1101F PT FALLS ASSESS-DOCD LE1/YR: CPT | Mod: CPTII,S$GLB,, | Performed by: STUDENT IN AN ORGANIZED HEALTH CARE EDUCATION/TRAINING PROGRAM

## 2025-08-20 PROCEDURE — 1157F ADVNC CARE PLAN IN RCRD: CPT | Mod: CPTII,S$GLB,, | Performed by: STUDENT IN AN ORGANIZED HEALTH CARE EDUCATION/TRAINING PROGRAM

## 2025-08-20 RX ORDER — SEVELAMER HYDROCHLORIDE 400 MG/1
800 TABLET, FILM COATED ORAL 2 TIMES DAILY WITH MEALS
COMMUNITY

## 2025-08-20 RX ORDER — FLUTICASONE PROPIONATE 50 MCG
1 SPRAY, SUSPENSION (ML) NASAL 2 TIMES DAILY
Qty: 16 G | Refills: 3 | Status: SHIPPED | OUTPATIENT
Start: 2025-08-20

## 2025-08-28 ENCOUNTER — TELEPHONE (OUTPATIENT)
Dept: RHEUMATOLOGY | Facility: CLINIC | Age: 80
End: 2025-08-28
Payer: MEDICARE

## 2025-09-03 DIAGNOSIS — Z79.01 CURRENT LONG-TERM USE OF ANTICOAGULANT MEDICATION WITH HISTORY OF DEEP VENOUS THROMBOSIS (DVT): ICD-10-CM

## 2025-09-03 DIAGNOSIS — Z86.718 CURRENT LONG-TERM USE OF ANTICOAGULANT MEDICATION WITH HISTORY OF DEEP VENOUS THROMBOSIS (DVT): ICD-10-CM

## (undated) DEVICE — DEV-O-LOOPS MINI BLUE

## (undated) DEVICE — SUT VICRYL 3-0 27 SH

## (undated) DEVICE — BLANKET LOWER BODY 55.9X40.2IN

## (undated) DEVICE — Device

## (undated) DEVICE — SUT LIGACLIP SMALL XTRA

## (undated) DEVICE — CANNULA INTRNL MAMRY ART 2MMTP

## (undated) DEVICE — SUT CV-6 30 IN TTC-09NDL

## (undated) DEVICE — HEMOSTAT SURGICEL 4X8IN

## (undated) DEVICE — SET DECANTER MEDICHOICE

## (undated) DEVICE — DRAPE STERI U-SHAPED 47X51IN

## (undated) DEVICE — NDL SAFETY 22G X 1.5 ECLIPSE

## (undated) DEVICE — SUT MCRYL PLUS 4-0 PS2 27IN

## (undated) DEVICE — SYR 10CC LUER LOCK

## (undated) DEVICE — SYR 30CC LUER LOCK

## (undated) DEVICE — PAD COLD THERAPY KNEE WRAP ON

## (undated) DEVICE — SYS LABLNG CORECT MED 4 FLG

## (undated) DEVICE — BOWL STERILE LARGE 32OZ

## (undated) DEVICE — SEE MEDLINE ITEM 146292

## (undated) DEVICE — KIT PROBE COVER WITH GEL

## (undated) DEVICE — CATH INTROCAN SAFETY 20GX1.75

## (undated) DEVICE — DRESSING XEROFORM FOIL PK 1X8

## (undated) DEVICE — PAD CAST SPECIALIST STRL 6

## (undated) DEVICE — PAD ABD 8X10 STERILE

## (undated) DEVICE — TOWEL OR DISP STRL BLUE 4/PK

## (undated) DEVICE — TOWEL OR XRAY WHITE 17X26IN

## (undated) DEVICE — PAD ELECTRODE STER 1.5X3

## (undated) DEVICE — ELECTRODE 90 DEGREE ANGLE

## (undated) DEVICE — CONTAINER SPECIMEN STRL 4OZ

## (undated) DEVICE — LOOP VESSEL BLUE MINI 2/CARD

## (undated) DEVICE — PADDING WYTEX UNDRCST 6INX4YD

## (undated) DEVICE — SEE MEDLINE ITEM 157150

## (undated) DEVICE — PUMP COLD THERAPY

## (undated) DEVICE — SOL IRR SOD CHL .9% POUR

## (undated) DEVICE — GAUZE SPONGE 4X4 12PLY

## (undated) DEVICE — SOL IRR NACL .9% 3000ML

## (undated) DEVICE — SEE MEDLINE ITEM 157110

## (undated) DEVICE — SPONGE LAP 18X18 PREWASHED

## (undated) DEVICE — TOURNIQUET SB QC DP 34X4IN

## (undated) DEVICE — SUT 2-0 12-18IN SILK

## (undated) DEVICE — SYR ONLY LUER LOCK 20CC

## (undated) DEVICE — SUT 4-0 12-18IN SILK BLACK

## (undated) DEVICE — BRACE KNEE T SCOPE PREMIER

## (undated) DEVICE — DRAPE STERI INSTRUMENT 1018

## (undated) DEVICE — BOOT SUTURE AID

## (undated) DEVICE — SUT SILK 3-0 SH 18IN BLACK

## (undated) DEVICE — BNDG COFLEX FOAM LF2 ST 6X5YD

## (undated) DEVICE — SUT 4-0 ETHILON 18 PS-2

## (undated) DEVICE — ELECTRODE REM PLYHSV RETURN 9

## (undated) DEVICE — SEE MEDLINE ITEM 157173

## (undated) DEVICE — SUT VICRYL CTD 2-0 GI 27 SH

## (undated) DEVICE — CATH IV JELCO 22GAX1

## (undated) DEVICE — DRAPE PLASTIC U 60X72

## (undated) DEVICE — NDL HYPO REG 25G X 1 1/2

## (undated) DEVICE — INSERT STEALTH FIB 30MM #1

## (undated) DEVICE — DRESSING XEROFORM 1X8IN

## (undated) DEVICE — SOL NS 1000CC

## (undated) DEVICE — SYR 3CC LUER LOC

## (undated) DEVICE — DRAPE EMERALD 87X114.75X113

## (undated) DEVICE — COVER OVERHEAD SURG LT BLUE

## (undated) DEVICE — GOWN SMARTGOWN LVL4 X-LONG XL

## (undated) DEVICE — GLOVE SURG BIOGEL LATEX SZ 7.5

## (undated) DEVICE — UNDERGLOVES BIOGEL PI SZ 7 LF

## (undated) DEVICE — SUT PROLENE 6-0 24 C-1 C-1

## (undated) DEVICE — SUT MONOCRYL 4.0 PS2 CP496G

## (undated) DEVICE — SEE MEDLINE ITEM 107746

## (undated) DEVICE — APPLICATOR CHLORAPREP ORN 26ML

## (undated) DEVICE — SUT VICRYL+ 2-0 SH 18IN

## (undated) DEVICE — GLOVE BIOGEL SKINSENSE PI 8.5

## (undated) DEVICE — GEL AQUASONIC 100 STERILE20GM

## (undated) DEVICE — SHAVER ULTRAFFR 4.2MM

## (undated) DEVICE — UNDERGLOVES BIOGEL PI SIZE 8.5

## (undated) DEVICE — TUBE SET INFLOW/OUTFLOW

## (undated) DEVICE — ADHESIVE DERMABOND MINI HV

## (undated) DEVICE — NDL 18GA X1 1/2 REG BEVEL

## (undated) DEVICE — SUT 7/0 24IN PROLENE BL MO

## (undated) DEVICE — CLIP MED TICALL

## (undated) DEVICE — SUT 3-0 12-18IN SILK

## (undated) DEVICE — GLOVE BIOGEL SKINSENSE PI 7.0

## (undated) DEVICE — STOCKINET 4INX48

## (undated) DEVICE — BLADE SURG CARBON STEEL SZ11